# Patient Record
Sex: FEMALE | Race: WHITE | NOT HISPANIC OR LATINO | Employment: OTHER | URBAN - METROPOLITAN AREA
[De-identification: names, ages, dates, MRNs, and addresses within clinical notes are randomized per-mention and may not be internally consistent; named-entity substitution may affect disease eponyms.]

---

## 2019-03-21 DIAGNOSIS — M25.561 PAIN IN BOTH KNEES, UNSPECIFIED CHRONICITY: Primary | ICD-10-CM

## 2019-03-21 DIAGNOSIS — M25.562 PAIN IN BOTH KNEES, UNSPECIFIED CHRONICITY: Primary | ICD-10-CM

## 2019-03-26 ENCOUNTER — OFFICE VISIT (OUTPATIENT)
Dept: ORTHOPEDICS | Facility: CLINIC | Age: 64
End: 2019-03-26
Payer: MEDICARE

## 2019-03-26 ENCOUNTER — HOSPITAL ENCOUNTER (OUTPATIENT)
Dept: RADIOLOGY | Facility: HOSPITAL | Age: 64
Discharge: HOME | End: 2019-03-26
Attending: ORTHOPAEDIC SURGERY
Payer: MEDICARE

## 2019-03-26 VITALS — BODY MASS INDEX: 46.6 KG/M2 | RESPIRATION RATE: 20 BRPM | WEIGHT: 263 LBS | HEIGHT: 63 IN

## 2019-03-26 DIAGNOSIS — M17.12 ARTHRITIS OF LEFT KNEE: Primary | ICD-10-CM

## 2019-03-26 DIAGNOSIS — M17.11 ARTHRITIS OF KNEE, RIGHT: ICD-10-CM

## 2019-03-26 DIAGNOSIS — M25.562 PAIN IN BOTH KNEES, UNSPECIFIED CHRONICITY: ICD-10-CM

## 2019-03-26 DIAGNOSIS — M17.0 PRIMARY OSTEOARTHRITIS OF BOTH KNEES: Primary | ICD-10-CM

## 2019-03-26 DIAGNOSIS — M25.561 PAIN IN BOTH KNEES, UNSPECIFIED CHRONICITY: ICD-10-CM

## 2019-03-26 PROCEDURE — 1101F PT FALLS ASSESS-DOCD LE1/YR: CPT | Mod: CPTII,S$GLB,, | Performed by: ORTHOPAEDIC SURGERY

## 2019-03-26 PROCEDURE — 20610 DRAIN/INJ JOINT/BURSA W/O US: CPT | Mod: 50,S$GLB,, | Performed by: ORTHOPAEDIC SURGERY

## 2019-03-26 PROCEDURE — 73564 X-RAY EXAM KNEE 4 OR MORE: CPT | Mod: TC,50,PN

## 2019-03-26 PROCEDURE — 99203 OFFICE O/P NEW LOW 30 MIN: CPT | Mod: 25,S$GLB,, | Performed by: ORTHOPAEDIC SURGERY

## 2019-03-26 PROCEDURE — 3008F BODY MASS INDEX DOCD: CPT | Mod: CPTII,S$GLB,, | Performed by: ORTHOPAEDIC SURGERY

## 2019-03-26 PROCEDURE — 99999 PR PBB SHADOW E&M-EST. PATIENT-LVL III: CPT | Mod: PBBFAC,,, | Performed by: ORTHOPAEDIC SURGERY

## 2019-03-26 PROCEDURE — 99203 PR OFFICE/OUTPT VISIT, NEW, LEVL III, 30-44 MIN: ICD-10-PCS | Mod: 25,S$GLB,, | Performed by: ORTHOPAEDIC SURGERY

## 2019-03-26 PROCEDURE — 73564 XR KNEE ORTHO BILAT WITH FLEXION: ICD-10-PCS | Mod: 26,50,, | Performed by: RADIOLOGY

## 2019-03-26 PROCEDURE — 73564 X-RAY EXAM KNEE 4 OR MORE: CPT | Mod: 26,50,, | Performed by: RADIOLOGY

## 2019-03-26 PROCEDURE — 20610 LARGE JOINT ASPIRATION/INJECTION: R KNEE, L KNEE: ICD-10-PCS | Mod: 50,S$GLB,, | Performed by: ORTHOPAEDIC SURGERY

## 2019-03-26 PROCEDURE — 99999 PR PBB SHADOW E&M-EST. PATIENT-LVL III: ICD-10-PCS | Mod: PBBFAC,,, | Performed by: ORTHOPAEDIC SURGERY

## 2019-03-26 PROCEDURE — 1101F PR PT FALLS ASSESS DOC 0-1 FALLS W/OUT INJ PAST YR: ICD-10-PCS | Mod: CPTII,S$GLB,, | Performed by: ORTHOPAEDIC SURGERY

## 2019-03-26 PROCEDURE — 3008F PR BODY MASS INDEX (BMI) DOCUMENTED: ICD-10-PCS | Mod: CPTII,S$GLB,, | Performed by: ORTHOPAEDIC SURGERY

## 2019-03-26 RX ORDER — FLUTICASONE PROPIONATE 50 MCG
1 SPRAY, SUSPENSION (ML) NASAL NIGHTLY
Status: ON HOLD | COMMUNITY
Start: 2019-03-06 | End: 2019-10-07 | Stop reason: HOSPADM

## 2019-03-26 RX ORDER — LISINOPRIL AND HYDROCHLOROTHIAZIDE 20; 25 MG/1; MG/1
1 TABLET ORAL DAILY
Status: ON HOLD | COMMUNITY
End: 2019-09-18 | Stop reason: HOSPADM

## 2019-03-26 RX ORDER — UMECLIDINIUM BROMIDE AND VILANTEROL TRIFENATATE 62.5; 25 UG/1; UG/1
1 POWDER RESPIRATORY (INHALATION) DAILY PRN
COMMUNITY
Start: 2019-03-19 | End: 2019-10-23 | Stop reason: SDUPTHER

## 2019-03-26 RX ORDER — LOVASTATIN 20 MG/1
20 TABLET ORAL NIGHTLY
Status: ON HOLD | COMMUNITY
End: 2019-10-07 | Stop reason: HOSPADM

## 2019-03-26 RX ORDER — FLUOXETINE HYDROCHLORIDE 40 MG/1
40 CAPSULE ORAL DAILY
Status: ON HOLD | COMMUNITY
Start: 2019-03-03 | End: 2019-10-07 | Stop reason: SDUPTHER

## 2019-03-26 RX ORDER — ALBUTEROL SULFATE 90 UG/1
1 AEROSOL, METERED RESPIRATORY (INHALATION) DAILY PRN
Status: ON HOLD | COMMUNITY
Start: 2019-03-06 | End: 2019-10-07 | Stop reason: HOSPADM

## 2019-03-26 RX ADMIN — TRIAMCINOLONE ACETONIDE 40 MG: 40 INJECTION, SUSPENSION INTRA-ARTICULAR; INTRAMUSCULAR at 11:03

## 2019-03-26 NOTE — LETTER
March 29, 2019      Roselyn Cote, NP  [address] [address]  [address]  [facility] [address]           [address]  [facility] [address]  Phone: [phone]          Patient: Iris Mueller   MR Number: [id number]   YOB: 1955   Date of Visit: 3/26/2019       Dear Roselyn Cote:    Thank you for referring Iris Mueller to me for evaluation. Attached you will find relevant portions of my assessment and plan of care.    If you have questions, please do not hesitate to call me. I look forward to following Iris Mueller along with you.    Sincerely,    Delio Chung MD    Enclosure  CC:  No Recipients    If you would like to receive this communication electronically, please contact [email or web address] or [phone] to request more information on [employer] access.    For providers and/or their staff who would like to refer a patient to [facility], please contact us through our one-stop-shop provider referral line, [facility], at [phone].    If you feel you have received this communication in error or would no longer like to receive these types of communications, please e-mail [email or web address]

## 2019-03-29 RX ORDER — TRIAMCINOLONE ACETONIDE 40 MG/ML
40 INJECTION, SUSPENSION INTRA-ARTICULAR; INTRAMUSCULAR
Status: DISCONTINUED | OUTPATIENT
Start: 2019-03-26 | End: 2019-03-29 | Stop reason: HOSPADM

## 2019-03-29 NOTE — PROCEDURES
Large Joint Aspiration/Injection: R knee, L knee  Date/Time: 3/26/2019 11:59 PM  Performed by: Delio Chung MD  Authorized by: Delio Chung MD     Consent Done?:  Yes (Verbal)  Indications:  Pain  Timeout: Prior to procedure the correct patient, procedure, and site was verified      Location:  Knee  Site:  R knee and L knee  Prep: Patient was prepped and draped in usual sterile fashion    Approach:  Anteromedial  Medications:  40 mg triamcinolone acetonide 40 mg/mL; 40 mg triamcinolone acetonide 40 mg/mL

## 2019-03-29 NOTE — PROGRESS NOTES
Past Medical History:   Diagnosis Date    Asthma     Hypertension        Past Surgical History:   Procedure Laterality Date     SECTION      TONSILLECTOMY         Current Outpatient Medications   Medication Sig    ANORO ELLIPTA 62.5-25 mcg/actuation DsDv     FLUoxetine 40 MG capsule     fluticasone (FLONASE) 50 mcg/actuation nasal spray     lisinopril-hydrochlorothiazide (PRINZIDE,ZESTORETIC) 20-25 mg Tab Take 1 tablet by mouth once daily.    lovastatin (MEVACOR) 20 MG tablet Take 20 mg by mouth every evening.    VENTOLIN HFA 90 mcg/actuation inhaler      No current facility-administered medications for this visit.        Review of patient's allergies indicates:  No Known Allergies    History reviewed. No pertinent family history.    Social History     Socioeconomic History    Marital status:      Spouse name: Not on file    Number of children: Not on file    Years of education: Not on file    Highest education level: Not on file   Occupational History    Not on file   Social Needs    Financial resource strain: Not on file    Food insecurity:     Worry: Not on file     Inability: Not on file    Transportation needs:     Medical: Not on file     Non-medical: Not on file   Tobacco Use    Smoking status: Current Every Day Smoker    Smokeless tobacco: Never Used   Substance and Sexual Activity    Alcohol use: Not on file    Drug use: Not on file    Sexual activity: Not on file   Lifestyle    Physical activity:     Days per week: Not on file     Minutes per session: Not on file    Stress: Not on file   Relationships    Social connections:     Talks on phone: Not on file     Gets together: Not on file     Attends Latter-day service: Not on file     Active member of club or organization: Not on file     Attends meetings of clubs or organizations: Not on file     Relationship status: Not on file    Intimate partner violence:     Fear of current or ex partner: Not on file      "Emotionally abused: Not on file     Physically abused: Not on file     Forced sexual activity: Not on file   Other Topics Concern    Not on file   Social History Narrative    Not on file       Chief Complaint:   Chief Complaint   Patient presents with    Left Knee - Pain    Right Knee - Pain       Consulting Physician: Roselyn Cote NP    History of present illness:    This is a 63 y.o. female who complains of moderate chronic bilateral knee pain for years. Denies current injury. Old injury right knee. Pain 6/10 and worse with use.    Review of Systems:    Constitution: Denies chills, fever, and sweats.  HENT: Denies headaches or blurry vision.  Cardiovascular: Denies chest pain or irregular heart beat.  Respiratory: Denies cough or shortness of breath.  Gastrointestinal: Denies abdominal pain, nausea, or vomiting.  Musculoskeletal:  Denies muscle cramps.  Neurological: Denies dizziness or focal weakness.  Psychiatric/Behavioral: Normal mental status.  Hematologic/Lymphatic: Denies bleeding problem or easy bruising/bleeding.  Skin: Denies rash or suspicious lesions.    Examination:    Vital Signs:    Vitals:    03/26/19 1133   Resp: 20   Weight: 119.3 kg (263 lb)   Height: 5' 3" (1.6 m)   PainSc:   6   PainLoc: Knee       Body mass index is 46.59 kg/m².    This a well-developed, well nourished patient in no acute distress.    Alert and oriented x 3 and cooperative to examination.       Physical Exam: Left Knee Exam    Gait   Antalgic    Skin  Rash:   None  Scars:   None    Inspection  Erythema:  None  Bruising:  None  Effusion:  None  Masses:  None  Lymphadenopathy: None    Range of Motion: 0 to 130°    Medial Joint : y  Lateral Joint : n    Patellofemoral Tenderness: Yes  Patellofemoral Crepitus: Yes    Lachman:  Normal  Anterior Drawer: Normal  Posterior Drawer: Normal    Vanessa's:  Negative  Apley's:  Negative    Varus Stress:  Stable  Valgus " Stress:  Stable    Strength:  5/5    Pulses:  Palpable  Sensation:  Intact      Right Knee Exam    Gait   Antalgic    Skin  Rash:   None  Scars:   None    Inspection  Erythema:  None  Bruising:  None  Effusion:  None  Masses:  None  Lymphadenopathy: None    Range of Motion: 0 to 130°    Medial Joint : y  Lateral Joint : n    Patellofemoral Tenderness: Yes  Patellofemoral Crepitus: Yes    Lachman:  Normal  Anterior Drawer: Normal  Posterior Drawer: Normal    Vanessa's:  Negative  Apley's:  Negative    Varus Stress:  Stable  Valgus Stress:  Stable    Strength:  5/5    Pulses:  Palpable  Sensation:  Intact          Imaging: X-rays ordered and images interpreted today personally by me of right knee shows old plateau fracture and left shows moderate DJD.        Assessment: Arthritis of left knee  -     Large Joint Aspiration/Injection: R knee, L knee    Arthritis of knee, right  -     Large Joint Aspiration/Injection: R knee, L knee        Plan:  Start with steroid today and then Euflexxa.      DISCLAIMER: This note may have been dictated using voice recognition software and may contain grammatical errors.     NOTE: Consult report sent to referring provider via [employer] EMR.

## 2019-04-05 ENCOUNTER — TELEPHONE (OUTPATIENT)
Dept: ORTHOPEDICS | Facility: CLINIC | Age: 64
End: 2019-04-05

## 2019-04-05 NOTE — TELEPHONE ENCOUNTER
Returned pt's call, advised that I would contact auth department to get the doctor's name corrected for her injections.  No further needs at this time.

## 2019-04-05 NOTE — TELEPHONE ENCOUNTER
----- Message from Luba Gilman sent at 4/5/2019 10:54 AM CDT -----  Type: Needs Medical Advice    Who Called:  Patient    Best Call Back Number: [phone]    Additional Information: Patient received a letter in the mail from her insurance saying her injections were approved but the provider listed waswajaime Austin. Patient stated she's never been seen by this provider. Please call and advise.

## 2019-04-09 ENCOUNTER — OFFICE VISIT (OUTPATIENT)
Dept: ORTHOPEDICS | Facility: CLINIC | Age: 64
End: 2019-04-09
Payer: MEDICARE

## 2019-04-09 VITALS — WEIGHT: 263 LBS | HEIGHT: 63 IN | BODY MASS INDEX: 46.6 KG/M2

## 2019-04-09 DIAGNOSIS — M17.0 PRIMARY OSTEOARTHRITIS OF BOTH KNEES: Primary | ICD-10-CM

## 2019-04-09 PROCEDURE — 99999 PR PBB SHADOW E&M-EST. PATIENT-LVL III: CPT | Mod: PBBFAC,,, | Performed by: ORTHOPAEDIC SURGERY

## 2019-04-09 PROCEDURE — 99499 UNLISTED E&M SERVICE: CPT | Mod: S$GLB,,, | Performed by: ORTHOPAEDIC SURGERY

## 2019-04-09 PROCEDURE — 99999 PR PBB SHADOW E&M-EST. PATIENT-LVL III: ICD-10-PCS | Mod: PBBFAC,,, | Performed by: ORTHOPAEDIC SURGERY

## 2019-04-09 PROCEDURE — 20610 DRAIN/INJ JOINT/BURSA W/O US: CPT | Mod: 50,S$GLB,, | Performed by: ORTHOPAEDIC SURGERY

## 2019-04-09 PROCEDURE — 99499 NO LOS: ICD-10-PCS | Mod: S$GLB,,, | Performed by: ORTHOPAEDIC SURGERY

## 2019-04-09 PROCEDURE — 20610 LARGE JOINT ASPIRATION/INJECTION: R KNEE, L KNEE: ICD-10-PCS | Mod: 50,S$GLB,, | Performed by: ORTHOPAEDIC SURGERY

## 2019-04-11 NOTE — PROCEDURES
Large Joint Aspiration/Injection: R knee, L knee  Date/Time: 4/9/2019 9:33 PM  Performed by: Delio Chung MD  Authorized by: Delio Chung MD     Consent Done?:  Yes (Verbal)  Indications:  Pain  Timeout: Prior to procedure the correct patient, procedure, and site was verified      Location:  Knee  Site:  R knee and L knee  Prep: Patient was prepped and draped in usual sterile fashion    Approach:  Anteromedial  Medications:  20 mg sodium hyaluronate (EUFLEXXA) 10 mg/mL(mw 2.4 -3.6 million); 20 mg sodium hyaluronate (EUFLEXXA) 10 mg/mL(mw 2.4 -3.6 million)

## 2019-04-11 NOTE — PROGRESS NOTES
Past Medical History:   Diagnosis Date    Asthma     Hypertension        Past Surgical History:   Procedure Laterality Date     SECTION      TONSILLECTOMY         Current Outpatient Medications   Medication Sig    ANORO ELLIPTA 62.5-25 mcg/actuation DsDv     FLUoxetine 40 MG capsule     fluticasone (FLONASE) 50 mcg/actuation nasal spray     lisinopril-hydrochlorothiazide (PRINZIDE,ZESTORETIC) 20-25 mg Tab Take 1 tablet by mouth once daily.    lovastatin (MEVACOR) 20 MG tablet Take 20 mg by mouth every evening.    VENTOLIN HFA 90 mcg/actuation inhaler      No current facility-administered medications for this visit.        Review of patient's allergies indicates:  No Known Allergies    History reviewed. No pertinent family history.    Social History     Socioeconomic History    Marital status:      Spouse name: Not on file    Number of children: Not on file    Years of education: Not on file    Highest education level: Not on file   Occupational History    Not on file   Social Needs    Financial resource strain: Not on file    Food insecurity:     Worry: Not on file     Inability: Not on file    Transportation needs:     Medical: Not on file     Non-medical: Not on file   Tobacco Use    Smoking status: Current Every Day Smoker    Smokeless tobacco: Never Used   Substance and Sexual Activity    Alcohol use: Not on file    Drug use: Not on file    Sexual activity: Not on file   Lifestyle    Physical activity:     Days per week: Not on file     Minutes per session: Not on file    Stress: Not on file   Relationships    Social connections:     Talks on phone: Not on file     Gets together: Not on file     Attends Adventist service: Not on file     Active member of club or organization: Not on file     Attends meetings of clubs or organizations: Not on file     Relationship status: Not on file   Other Topics Concern    Not on file   Social History Narrative    Not on file  "      Chief Complaint:   Chief Complaint   Patient presents with    Injections     EUFLEXXA 1/3 BILATERAL KNEE       Consulting Physician: Delio Chung MD    History of present illness:    This is a 63 y.o. female who complains of moderate chronic bilateral knee pain for years. Denies current injury. Old injury right knee. Pain 6/10 and worse with use.    Review of Systems:    Constitution: Denies chills, fever, and sweats.  HENT: Denies headaches or blurry vision.  Cardiovascular: Denies chest pain or irregular heart beat.  Respiratory: Denies cough or shortness of breath.  Gastrointestinal: Denies abdominal pain, nausea, or vomiting.  Musculoskeletal:  Denies muscle cramps.  Neurological: Denies dizziness or focal weakness.  Psychiatric/Behavioral: Normal mental status.  Hematologic/Lymphatic: Denies bleeding problem or easy bruising/bleeding.  Skin: Denies rash or suspicious lesions.    Examination:    Vital Signs:    Vitals:    04/09/19 1409   Weight: 119.3 kg (263 lb 0.1 oz)   Height: 5' 3" (1.6 m)   PainSc: 10-Worst pain ever   PainLoc: Knee       Body mass index is 46.59 kg/m².    This a well-developed, well nourished patient in no acute distress.    Alert and oriented x 3 and cooperative to examination.       Physical Exam: Left Knee Exam    Gait   Antalgic    Skin  Rash:   None  Scars:   None    Inspection  Erythema:  None  Bruising:  None  Effusion:  None  Masses:  None  Lymphadenopathy: None    Range of Motion: 0 to 130°    Medial Joint : y  Lateral Joint : n    Patellofemoral Tenderness: Yes  Patellofemoral Crepitus: Yes    Lachman:  Normal  Anterior Drawer: Normal  Posterior Drawer: Normal    Vanessa's:  Negative  Apley's:  Negative    Varus Stress:  Stable  Valgus Stress:  Stable    Strength:  5/5    Pulses:  Palpable  Sensation:  Intact      Right Knee " Exam    Gait   Antalgic    Skin  Rash:   None  Scars:   None    Inspection  Erythema:  None  Bruising:  None  Effusion:  None  Masses:  None  Lymphadenopathy: None    Range of Motion: 0 to 130°    Medial Joint : y  Lateral Joint : n    Patellofemoral Tenderness: Yes  Patellofemoral Crepitus: Yes    Lachman:  Normal  Anterior Drawer: Normal  Posterior Drawer: Normal    Vanessa's:  Negative  Apley's:  Negative    Varus Stress:  Stable  Valgus Stress:  Stable    Strength:  5/5    Pulses:  Palpable  Sensation:  Intact          Imaging: X-rays ordered and images interpreted today personally by me of right knee shows old plateau fracture and left shows moderate DJD.        Assessment: Primary osteoarthritis of both knees  -     Large Joint Aspiration/Injection: R knee, L knee        Plan: Euflexxa.      DISCLAIMER: This note may have been dictated using voice recognition software and may contain grammatical errors.     NOTE: Consult report sent to referring provider via [employer] EMR.

## 2019-04-16 ENCOUNTER — OFFICE VISIT (OUTPATIENT)
Dept: ORTHOPEDICS | Facility: CLINIC | Age: 64
End: 2019-04-16
Payer: MEDICARE

## 2019-04-16 VITALS — WEIGHT: 263 LBS | HEIGHT: 63 IN | BODY MASS INDEX: 46.6 KG/M2

## 2019-04-16 DIAGNOSIS — M17.0 PRIMARY OSTEOARTHRITIS OF BOTH KNEES: Primary | ICD-10-CM

## 2019-04-16 PROCEDURE — 20610 DRAIN/INJ JOINT/BURSA W/O US: CPT | Mod: 50,S$GLB,, | Performed by: ORTHOPAEDIC SURGERY

## 2019-04-16 PROCEDURE — 20610 LARGE JOINT ASPIRATION/INJECTION: R KNEE, L KNEE: ICD-10-PCS | Mod: 50,S$GLB,, | Performed by: ORTHOPAEDIC SURGERY

## 2019-04-16 PROCEDURE — 99999 PR PBB SHADOW E&M-EST. PATIENT-LVL II: ICD-10-PCS | Mod: PBBFAC,,, | Performed by: ORTHOPAEDIC SURGERY

## 2019-04-16 PROCEDURE — 99499 NO LOS: ICD-10-PCS | Mod: S$GLB,,, | Performed by: ORTHOPAEDIC SURGERY

## 2019-04-16 PROCEDURE — 99499 UNLISTED E&M SERVICE: CPT | Mod: S$GLB,,, | Performed by: ORTHOPAEDIC SURGERY

## 2019-04-16 PROCEDURE — 99999 PR PBB SHADOW E&M-EST. PATIENT-LVL II: CPT | Mod: PBBFAC,,, | Performed by: ORTHOPAEDIC SURGERY

## 2019-04-16 NOTE — PROGRESS NOTES
Past Medical History:   Diagnosis Date    Asthma     Hypertension        Past Surgical History:   Procedure Laterality Date     SECTION      TONSILLECTOMY         Current Outpatient Medications   Medication Sig    ANORO ELLIPTA 62.5-25 mcg/actuation DsDv     FLUoxetine 40 MG capsule     fluticasone (FLONASE) 50 mcg/actuation nasal spray     lisinopril-hydrochlorothiazide (PRINZIDE,ZESTORETIC) 20-25 mg Tab Take 1 tablet by mouth once daily.    lovastatin (MEVACOR) 20 MG tablet Take 20 mg by mouth every evening.    VENTOLIN HFA 90 mcg/actuation inhaler      No current facility-administered medications for this visit.        Review of patient's allergies indicates:  No Known Allergies    History reviewed. No pertinent family history.    Social History     Socioeconomic History    Marital status:      Spouse name: Not on file    Number of children: Not on file    Years of education: Not on file    Highest education level: Not on file   Occupational History    Not on file   Social Needs    Financial resource strain: Not on file    Food insecurity:     Worry: Not on file     Inability: Not on file    Transportation needs:     Medical: Not on file     Non-medical: Not on file   Tobacco Use    Smoking status: Current Every Day Smoker    Smokeless tobacco: Never Used   Substance and Sexual Activity    Alcohol use: Not on file    Drug use: Not on file    Sexual activity: Not on file   Lifestyle    Physical activity:     Days per week: Not on file     Minutes per session: Not on file    Stress: Not on file   Relationships    Social connections:     Talks on phone: Not on file     Gets together: Not on file     Attends Yarsanism service: Not on file     Active member of club or organization: Not on file     Attends meetings of clubs or organizations: Not on file     Relationship status: Not on file   Other Topics Concern    Not on file   Social History Narrative    Not on file  "      Chief Complaint:   Chief Complaint   Patient presents with    Knee Pain     bilateral knee Euflexxa 2/3       Consulting Physician: No ref. provider found    History of present illness:    This is a 63 y.o. female who complains of moderate chronic bilateral knee pain for years. Denies current injury. Old injury right knee. Pain 6/10 and worse with use.    Review of Systems:    Constitution: Denies chills, fever, and sweats.  HENT: Denies headaches or blurry vision.  Cardiovascular: Denies chest pain or irregular heart beat.  Respiratory: Denies cough or shortness of breath.  Gastrointestinal: Denies abdominal pain, nausea, or vomiting.  Musculoskeletal:  Denies muscle cramps.  Neurological: Denies dizziness or focal weakness.  Psychiatric/Behavioral: Normal mental status.  Hematologic/Lymphatic: Denies bleeding problem or easy bruising/bleeding.  Skin: Denies rash or suspicious lesions.    Examination:    Vital Signs:    Vitals:    04/16/19 1404   Weight: 119.3 kg (263 lb)   Height: 5' 3" (1.6 m)   PainSc:   8       Body mass index is 46.59 kg/m².    This a well-developed, well nourished patient in no acute distress.    Alert and oriented x 3 and cooperative to examination.       Physical Exam: Left Knee Exam    Gait   Antalgic    Skin  Rash:   None  Scars:   None    Inspection  Erythema:  None  Bruising:  None  Effusion:  None  Masses:  None  Lymphadenopathy: None    Range of Motion: 0 to 130°    Medial Joint : y  Lateral Joint : n    Patellofemoral Tenderness: Yes  Patellofemoral Crepitus: Yes    Lachman:  Normal  Anterior Drawer: Normal  Posterior Drawer: Normal    Vanessa's:  Negative  Apley's:  Negative    Varus Stress:  Stable  Valgus Stress:  Stable    Strength:  5/5    Pulses:  Palpable  Sensation:  Intact      Right Knee " Exam    Gait   Antalgic    Skin  Rash:   None  Scars:   None    Inspection  Erythema:  None  Bruising:  None  Effusion:  None  Masses:  None  Lymphadenopathy: None    Range of Motion: 0 to 130°    Medial Joint : y  Lateral Joint : n    Patellofemoral Tenderness: Yes  Patellofemoral Crepitus: Yes    Lachman:  Normal  Anterior Drawer: Normal  Posterior Drawer: Normal    Vanessa's:  Negative  Apley's:  Negative    Varus Stress:  Stable  Valgus Stress:  Stable    Strength:  5/5    Pulses:  Palpable  Sensation:  Intact          Imaging: X-rays ordered and images interpreted today personally by me of right knee shows old plateau fracture and left shows moderate DJD.        Assessment: Primary osteoarthritis of both knees  -     Large Joint Aspiration/Injection: R knee, L knee        Plan: Euflexxa.      DISCLAIMER: This note may have been dictated using voice recognition software and may contain grammatical errors.     NOTE: Consult report sent to referring provider via [employer] EMR.

## 2019-04-16 NOTE — PROCEDURES
Large Joint Aspiration/Injection: R knee, L knee  Date/Time: 4/16/2019 2:22 PM  Performed by: Delio Chung MD  Authorized by: Delio Chung MD     Consent Done?:  Yes (Verbal)  Indications:  Pain  Timeout: Prior to procedure the correct patient, procedure, and site was verified      Location:  Knee  Site:  R knee and L knee  Prep: Patient was prepped and draped in usual sterile fashion    Approach:  Anteromedial  Medications:  20 mg sodium hyaluronate (EUFLEXXA) 10 mg/mL(mw 2.4 -3.6 million); 20 mg sodium hyaluronate (EUFLEXXA) 10 mg/mL(mw 2.4 -3.6 million)

## 2019-04-23 ENCOUNTER — OFFICE VISIT (OUTPATIENT)
Dept: ORTHOPEDICS | Facility: CLINIC | Age: 64
End: 2019-04-23
Payer: MEDICARE

## 2019-04-23 VITALS — WEIGHT: 263 LBS | BODY MASS INDEX: 46.6 KG/M2 | HEIGHT: 63 IN

## 2019-04-23 DIAGNOSIS — M17.0 PRIMARY OSTEOARTHRITIS OF BOTH KNEES: Primary | ICD-10-CM

## 2019-04-23 PROCEDURE — 20610 DRAIN/INJ JOINT/BURSA W/O US: CPT | Mod: 50,S$GLB,, | Performed by: ORTHOPAEDIC SURGERY

## 2019-04-23 PROCEDURE — 20610 LARGE JOINT ASPIRATION/INJECTION: R KNEE, L KNEE: ICD-10-PCS | Mod: 50,S$GLB,, | Performed by: ORTHOPAEDIC SURGERY

## 2019-04-23 PROCEDURE — 99499 NO LOS: ICD-10-PCS | Mod: S$GLB,,, | Performed by: ORTHOPAEDIC SURGERY

## 2019-04-23 PROCEDURE — 99499 UNLISTED E&M SERVICE: CPT | Mod: S$GLB,,, | Performed by: ORTHOPAEDIC SURGERY

## 2019-04-23 PROCEDURE — 99999 PR PBB SHADOW E&M-EST. PATIENT-LVL II: ICD-10-PCS | Mod: PBBFAC,,, | Performed by: ORTHOPAEDIC SURGERY

## 2019-04-23 PROCEDURE — 99999 PR PBB SHADOW E&M-EST. PATIENT-LVL II: CPT | Mod: PBBFAC,,, | Performed by: ORTHOPAEDIC SURGERY

## 2019-04-24 NOTE — PROCEDURES
Large Joint Aspiration/Injection: R knee, L knee  Date/Time: 4/23/2019 10:51 PM  Performed by: Delio Chung MD  Authorized by: Delio Chung MD     Consent Done?:  Yes (Verbal)  Indications:  Pain  Timeout: Prior to procedure the correct patient, procedure, and site was verified      Location:  Knee  Site:  R knee and L knee  Prep: Patient was prepped and draped in usual sterile fashion    Approach:  Anteromedial  Medications:  20 mg sodium hyaluronate (EUFLEXXA) 10 mg/mL(mw 2.4 -3.6 million); 20 mg sodium hyaluronate (EUFLEXXA) 10 mg/mL(mw 2.4 -3.6 million)

## 2019-04-24 NOTE — PROGRESS NOTES
Past Medical History:   Diagnosis Date    Asthma     Hypertension        Past Surgical History:   Procedure Laterality Date     SECTION      TONSILLECTOMY         Current Outpatient Medications   Medication Sig    ANORO ELLIPTA 62.5-25 mcg/actuation DsDv     FLUoxetine 40 MG capsule     fluticasone (FLONASE) 50 mcg/actuation nasal spray     lisinopril-hydrochlorothiazide (PRINZIDE,ZESTORETIC) 20-25 mg Tab Take 1 tablet by mouth once daily.    lovastatin (MEVACOR) 20 MG tablet Take 20 mg by mouth every evening.    VENTOLIN HFA 90 mcg/actuation inhaler      No current facility-administered medications for this visit.        Review of patient's allergies indicates:  No Known Allergies    History reviewed. No pertinent family history.    Social History     Socioeconomic History    Marital status:      Spouse name: Not on file    Number of children: Not on file    Years of education: Not on file    Highest education level: Not on file   Occupational History    Not on file   Social Needs    Financial resource strain: Not on file    Food insecurity:     Worry: Not on file     Inability: Not on file    Transportation needs:     Medical: Not on file     Non-medical: Not on file   Tobacco Use    Smoking status: Current Every Day Smoker    Smokeless tobacco: Never Used   Substance and Sexual Activity    Alcohol use: Not on file    Drug use: Not on file    Sexual activity: Not on file   Lifestyle    Physical activity:     Days per week: Not on file     Minutes per session: Not on file    Stress: Not on file   Relationships    Social connections:     Talks on phone: Not on file     Gets together: Not on file     Attends Pentecostalism service: Not on file     Active member of club or organization: Not on file     Attends meetings of clubs or organizations: Not on file     Relationship status: Not on file   Other Topics Concern    Not on file   Social History Narrative    Not on file  "      Chief Complaint:   Chief Complaint   Patient presents with    Injections     EUFLEXXA 3/3 BILATERAL KNEE       Consulting Physician: No ref. provider found    History of present illness:    This is a 63 y.o. female who complains of moderate chronic bilateral knee pain for years. Denies current injury. Old injury right knee. Pain 6/10 and worse with use.    Review of Systems:    Constitution: Denies chills, fever, and sweats.  HENT: Denies headaches or blurry vision.  Cardiovascular: Denies chest pain or irregular heart beat.  Respiratory: Denies cough or shortness of breath.  Gastrointestinal: Denies abdominal pain, nausea, or vomiting.  Musculoskeletal:  Denies muscle cramps.  Neurological: Denies dizziness or focal weakness.  Psychiatric/Behavioral: Normal mental status.  Hematologic/Lymphatic: Denies bleeding problem or easy bruising/bleeding.  Skin: Denies rash or suspicious lesions.    Examination:    Vital Signs:    Vitals:    04/23/19 1413   Weight: 119.3 kg (263 lb 0.1 oz)   Height: 5' 3" (1.6 m)   PainSc: 10-Worst pain ever   PainLoc: Knee       Body mass index is 46.59 kg/m².    This a well-developed, well nourished patient in no acute distress.    Alert and oriented x 3 and cooperative to examination.       Physical Exam: Left Knee Exam    Gait   Antalgic    Skin  Rash:   None  Scars:   None    Inspection  Erythema:  None  Bruising:  None  Effusion:  None  Masses:  None  Lymphadenopathy: None    Range of Motion: 0 to 130°    Medial Joint : y  Lateral Joint : n    Patellofemoral Tenderness: Yes  Patellofemoral Crepitus: Yes    Lachman:  Normal  Anterior Drawer: Normal  Posterior Drawer: Normal    Vanessa's:  Negative  Apley's:  Negative    Varus Stress:  Stable  Valgus Stress:  Stable    Strength:  5/5    Pulses:  Palpable  Sensation:  Intact      Right Knee " Exam    Gait   Antalgic    Skin  Rash:   None  Scars:   None    Inspection  Erythema:  None  Bruising:  None  Effusion:  None  Masses:  None  Lymphadenopathy: None    Range of Motion: 0 to 130°    Medial Joint : y  Lateral Joint : n    Patellofemoral Tenderness: Yes  Patellofemoral Crepitus: Yes    Lachman:  Normal  Anterior Drawer: Normal  Posterior Drawer: Normal    Vanessa's:  Negative  Apley's:  Negative    Varus Stress:  Stable  Valgus Stress:  Stable    Strength:  5/5    Pulses:  Palpable  Sensation:  Intact          Imaging: X-rays ordered and images interpreted today personally by me of right knee shows old plateau fracture and left shows moderate DJD.        Assessment: Primary osteoarthritis of both knees  -     Large Joint Aspiration/Injection: R knee, L knee        Plan: Euflexxa.      DISCLAIMER: This note may have been dictated using voice recognition software and may contain grammatical errors.     NOTE: Consult report sent to referring provider via [employer] EMR.

## 2019-06-04 ENCOUNTER — OFFICE VISIT (OUTPATIENT)
Dept: ORTHOPEDICS | Facility: CLINIC | Age: 64
End: 2019-06-04
Payer: MEDICARE

## 2019-06-04 VITALS — BODY MASS INDEX: 46.6 KG/M2 | RESPIRATION RATE: 18 BRPM | HEIGHT: 63 IN | WEIGHT: 263 LBS

## 2019-06-04 DIAGNOSIS — M17.11 PRIMARY OSTEOARTHRITIS OF RIGHT KNEE: Primary | ICD-10-CM

## 2019-06-04 DIAGNOSIS — Z01.818 PRE-OP EXAM: Primary | ICD-10-CM

## 2019-06-04 DIAGNOSIS — M17.11 PRIMARY OSTEOARTHRITIS OF RIGHT KNEE: ICD-10-CM

## 2019-06-04 PROCEDURE — 99999 PR PBB SHADOW E&M-EST. PATIENT-LVL III: CPT | Mod: PBBFAC,,, | Performed by: ORTHOPAEDIC SURGERY

## 2019-06-04 PROCEDURE — 3008F BODY MASS INDEX DOCD: CPT | Mod: CPTII,S$GLB,, | Performed by: ORTHOPAEDIC SURGERY

## 2019-06-04 PROCEDURE — 99215 PR OFFICE/OUTPT VISIT, EST, LEVL V, 40-54 MIN: ICD-10-PCS | Mod: S$GLB,,, | Performed by: ORTHOPAEDIC SURGERY

## 2019-06-04 PROCEDURE — 3008F PR BODY MASS INDEX (BMI) DOCUMENTED: ICD-10-PCS | Mod: CPTII,S$GLB,, | Performed by: ORTHOPAEDIC SURGERY

## 2019-06-04 PROCEDURE — 99999 PR PBB SHADOW E&M-EST. PATIENT-LVL III: ICD-10-PCS | Mod: PBBFAC,,, | Performed by: ORTHOPAEDIC SURGERY

## 2019-06-04 PROCEDURE — 1101F PR PT FALLS ASSESS DOC 0-1 FALLS W/OUT INJ PAST YR: ICD-10-PCS | Mod: CPTII,S$GLB,, | Performed by: ORTHOPAEDIC SURGERY

## 2019-06-04 PROCEDURE — 99215 OFFICE O/P EST HI 40 MIN: CPT | Mod: S$GLB,,, | Performed by: ORTHOPAEDIC SURGERY

## 2019-06-04 PROCEDURE — 1101F PT FALLS ASSESS-DOCD LE1/YR: CPT | Mod: CPTII,S$GLB,, | Performed by: ORTHOPAEDIC SURGERY

## 2019-06-05 RX ORDER — MUPIROCIN 20 MG/G
OINTMENT TOPICAL
Status: CANCELLED | OUTPATIENT
Start: 2019-06-05

## 2019-06-05 RX ORDER — SODIUM CHLORIDE 9 MG/ML
INJECTION, SOLUTION INTRAVENOUS CONTINUOUS
Status: CANCELLED | OUTPATIENT
Start: 2019-06-05

## 2019-06-06 NOTE — PROGRESS NOTES
Past Medical History:   Diagnosis Date    Asthma     Hypertension        Past Surgical History:   Procedure Laterality Date     SECTION      TONSILLECTOMY         Current Outpatient Medications   Medication Sig    ANORO ELLIPTA 62.5-25 mcg/actuation DsDv     FLUoxetine 40 MG capsule     fluticasone (FLONASE) 50 mcg/actuation nasal spray     lisinopril-hydrochlorothiazide (PRINZIDE,ZESTORETIC) 20-25 mg Tab Take 1 tablet by mouth once daily.    lovastatin (MEVACOR) 20 MG tablet Take 20 mg by mouth every evening.    VENTOLIN HFA 90 mcg/actuation inhaler      No current facility-administered medications for this visit.        Review of patient's allergies indicates:  No Known Allergies    History reviewed. No pertinent family history.    Social History     Socioeconomic History    Marital status:      Spouse name: Not on file    Number of children: Not on file    Years of education: Not on file    Highest education level: Not on file   Occupational History    Not on file   Social Needs    Financial resource strain: Not on file    Food insecurity:     Worry: Not on file     Inability: Not on file    Transportation needs:     Medical: Not on file     Non-medical: Not on file   Tobacco Use    Smoking status: Current Every Day Smoker    Smokeless tobacco: Never Used   Substance and Sexual Activity    Alcohol use: Not on file    Drug use: Not on file    Sexual activity: Not on file   Lifestyle    Physical activity:     Days per week: Not on file     Minutes per session: Not on file    Stress: Not on file   Relationships    Social connections:     Talks on phone: Not on file     Gets together: Not on file     Attends [employer] service: Not on file     Active member of club or organization: Not on file     Attends meetings of clubs or organizations: Not on file     Relationship status: Not on file   Other Topics Concern    Not on file   Social History Narrative    Not on file  "      Chief Complaint:   Chief Complaint   Patient presents with    Left Knee - Pain    Right Knee - Pain       Consulting Physician: No ref. provider found    History of present illness:    This is a 63 y.o. female who complains of moderate chronic bilateral knee pain for years. Denies current injury. Old injury right knee. Pain 9/10 and worse with use. Injections helped a little for a short time. NSAIDS and home exercise program have failed to improve symptoms.    Review of Systems:    Constitution: Denies chills, fever, and sweats.  HENT: Denies headaches or blurry vision.  Cardiovascular: Denies chest pain or irregular heart beat.  Respiratory: Denies cough or shortness of breath.  Gastrointestinal: Denies abdominal pain, nausea, or vomiting.  Musculoskeletal:  Denies muscle cramps.  Neurological: Denies dizziness or focal weakness.  Psychiatric/Behavioral: Normal mental status.  Hematologic/Lymphatic: Denies bleeding problem or easy bruising/bleeding.  Skin: Denies rash or suspicious lesions.    Examination:    Vital Signs:    Vitals:    06/04/19 1402   Resp: 18   Weight: 119.3 kg (263 lb)   Height: 5' 3" (1.6 m)   PainSc:   9       Body mass index is 46.59 kg/m².    This a well-developed, well nourished patient in no acute distress.    Alert and oriented x 3 and cooperative to examination.       Physical Exam: Left Knee Exam    Gait   Antalgic    Skin  Rash:   None  Scars:   None    Inspection  Erythema:  None  Bruising:  None  Effusion:  None  Masses:  None  Lymphadenopathy: None    Range of Motion: 0 to 130°    Medial Joint : y  Lateral Joint : n    Patellofemoral Tenderness: Yes  Patellofemoral Crepitus: Yes    Lachman:  Normal  Anterior Drawer: Normal  Posterior Drawer: Normal    Vanessa's:  Negative  Apley's:  Negative    Varus Stress:  Stable  Valgus Stress:  Stable    Strength:  5/5    Pulses:  Palpable  Sensation:  Intact      Right Knee " Exam    Gait   Antalgic    Skin  Rash:   None  Scars:   None    Inspection  Erythema:  None  Bruising:  None  Effusion:  None  Masses:  None  Lymphadenopathy: None    Range of Motion: 0 to 130°    Medial Joint : y  Lateral Joint : n    Patellofemoral Tenderness: Yes  Patellofemoral Crepitus: Yes    Lachman:  Normal  Anterior Drawer: Normal  Posterior Drawer: Normal    Vanessa's:  Negative  Apley's:  Negative    Varus Stress:  Stable  Valgus Stress:  Stable    Strength:  5/5    Pulses:  Palpable  Sensation:  Intact          Imaging: X-rays of right knee shows old plateau fracture with severe DJD and left shows moderate DJD.        Assessment: Pre-op exam  -     Case Request Operating Room: ARTHROPLASTY, KNEE    Primary osteoarthritis of right knee  -     Case Request Operating Room: ARTHROPLASTY, KNEE        Plan: She is requesting right TKA due to increase in pain. Has family history of blood clots.    After discussing the diagnosis and reviewing treatment options, the patient/family elected to proceed with surgical intervention.    In preparation for surgery:    A pre-operative clearance request was placed with primary care physician.    Appropriate pre-operative labs were ordered.    An EKG examination was ordered.    A chest X-ray was ordered.    A pre-operative planning MRI study was ordered.    Pertinent risk factors and comorbidities for surgery were identified and reviewed, including HTN and clotting issues in family (Mother, brother)    Pre-operative antibiotics were ordered.    The risks, benefits, and alternatives to the procedure were explained to the patient/family including, but not limited to: incomplete pain relief, surgical failure, hardware failure, need for further procedures, damage to nerves, arteries, blood vessels and other structures, infection, Deep Vein Thrombosis (DVT), Pulmonary Embolus (PE), Complex Regional Pain Syndrome as well as general anesthetic complications  including seizure, stroke, heart attack and even death. The patient/family understood these risks and wished to proceed and signed the informed consent. All questions were answered. No guarantees were implied or stated.    We will obtain the necessary clearances and schedule the patient for surgery.            DISCLAIMER: This note may have been dictated using voice recognition software and may contain grammatical errors.     NOTE: Consult report sent to referring provider via [employer] EMR.

## 2019-06-17 ENCOUNTER — TELEPHONE (OUTPATIENT)
Dept: ORTHOPEDICS | Facility: CLINIC | Age: 64
End: 2019-06-17

## 2019-06-17 NOTE — TELEPHONE ENCOUNTER
----- Message from Delilah Veloz sent at 6/17/2019  2:28 PM CDT -----  Contact: Patient  Type: Needs Medical Advice    Who Called:  Patient  Symptoms (please be specific):  Possible osteopsorsis  How long has patient had these symptoms:  fede  Pharmacy name and phone #:  fede  Best Call Back Number: [phone] (home)    Additional Information: Patient states that she had an appointment with her primary care provider, states that she was told that she shows signs of osteopsorsis. States that she is unsure if she should take the medication due to her upcoming surgery. Please call to advise, thank you!

## 2019-06-20 ENCOUNTER — TELEPHONE (OUTPATIENT)
Dept: ORTHOPEDICS | Facility: CLINIC | Age: 64
End: 2019-06-20

## 2019-06-20 NOTE — TELEPHONE ENCOUNTER
Called Dr. You's office at providers request.  Sent message to his nurse to ask if they would order labs on patient due to her family history of fatal blood clots.

## 2019-06-25 ENCOUNTER — HOSPITAL ENCOUNTER (OUTPATIENT)
Dept: RADIOLOGY | Facility: HOSPITAL | Age: 64
Discharge: HOME | End: 2019-06-25
Attending: ORTHOPAEDIC SURGERY
Payer: MEDICARE

## 2019-06-25 DIAGNOSIS — M17.11 PRIMARY OSTEOARTHRITIS OF RIGHT KNEE: ICD-10-CM

## 2019-06-25 PROCEDURE — 73721 MRI KNEE WITHOUT CONTRAST RIGHT: ICD-10-PCS | Mod: 26,RT,, | Performed by: RADIOLOGY

## 2019-06-25 PROCEDURE — 73721 MRI JNT OF LWR EXTRE W/O DYE: CPT | Mod: 26,RT,, | Performed by: RADIOLOGY

## 2019-06-25 PROCEDURE — 73721 MRI JNT OF LWR EXTRE W/O DYE: CPT | Mod: TC,RT

## 2019-06-25 PROCEDURE — 77073 BONE LENGTH STUDIES: CPT | Mod: 26,,, | Performed by: RADIOLOGY

## 2019-06-25 PROCEDURE — 77073 BONE LENGTH STUDIES: CPT | Mod: TC,FY

## 2019-06-25 PROCEDURE — 77073 XR HIP TO ANKLE: ICD-10-PCS | Mod: 26,,, | Performed by: RADIOLOGY

## 2019-08-01 ENCOUNTER — TELEPHONE (OUTPATIENT)
Dept: CARDIOLOGY | Facility: CLINIC | Age: 64
End: 2019-08-01

## 2019-08-01 ENCOUNTER — HOSPITAL ENCOUNTER (OUTPATIENT)
Dept: PREADMISSION TESTING | Facility: HOSPITAL | Age: 64
Discharge: HOME | End: 2019-08-01
Attending: ORTHOPAEDIC SURGERY
Payer: MEDICARE

## 2019-08-01 ENCOUNTER — HOSPITAL ENCOUNTER (OUTPATIENT)
Dept: RADIOLOGY | Facility: HOSPITAL | Age: 64
Discharge: HOME | End: 2019-08-01
Attending: ORTHOPAEDIC SURGERY
Payer: MEDICARE

## 2019-08-01 ENCOUNTER — TELEPHONE (OUTPATIENT)
Dept: ORTHOPEDICS | Facility: CLINIC | Age: 64
End: 2019-08-01

## 2019-08-01 VITALS — WEIGHT: 265 LBS | BODY MASS INDEX: 48.76 KG/M2 | HEIGHT: 62 IN

## 2019-08-01 DIAGNOSIS — I10 ESSENTIAL (PRIMARY) HYPERTENSION: ICD-10-CM

## 2019-08-01 DIAGNOSIS — Z01.818 PRE-OP EXAM: ICD-10-CM

## 2019-08-01 DIAGNOSIS — I10 ESSENTIAL HYPERTENSION, MALIGNANT: Primary | ICD-10-CM

## 2019-08-01 DIAGNOSIS — M17.11 PRIMARY OSTEOARTHRITIS OF RIGHT KNEE: Primary | ICD-10-CM

## 2019-08-01 DIAGNOSIS — Z01.818 PREOP EXAMINATION: ICD-10-CM

## 2019-08-01 DIAGNOSIS — F17.210 CIGARETTE SMOKER: ICD-10-CM

## 2019-08-01 DIAGNOSIS — Z01.818 PRE-OPERATIVE CLEARANCE: Primary | ICD-10-CM

## 2019-08-01 DIAGNOSIS — R07.9 CHEST PAIN, UNSPECIFIED: ICD-10-CM

## 2019-08-01 LAB
ABO + RH BLD: NORMAL
ANION GAP SERPL CALC-SCNC: 9 MMOL/L (ref 8–16)
BASOPHILS # BLD AUTO: 0.05 K/UL (ref 0–0.2)
BASOPHILS NFR BLD: 0.5 % (ref 0–1.9)
BLD GP AB SCN CELLS X3 SERPL QL: NORMAL
BUN SERPL-MCNC: 13 MG/DL (ref 8–23)
CALCIUM SERPL-MCNC: 9.8 MG/DL (ref 8.7–10.5)
CHLORIDE SERPL-SCNC: 92 MMOL/L (ref 95–110)
CO2 SERPL-SCNC: 28 MMOL/L (ref 23–29)
CREAT SERPL-MCNC: 1.1 MG/DL (ref 0.5–1.4)
DIFFERENTIAL METHOD: ABNORMAL
EOSINOPHIL # BLD AUTO: 0.2 K/UL (ref 0–0.5)
EOSINOPHIL NFR BLD: 2.2 % (ref 0–8)
ERYTHROCYTE [DISTWIDTH] IN BLOOD BY AUTOMATED COUNT: 13.8 % (ref 11.5–14.5)
EST. GFR  (AFRICAN AMERICAN): >60 ML/MIN/1.73 M^2
EST. GFR  (NON AFRICAN AMERICAN): 53 ML/MIN/1.73 M^2
GLUCOSE SERPL-MCNC: 87 MG/DL (ref 70–110)
HCT VFR BLD AUTO: 39.8 % (ref 37–48.5)
HGB BLD-MCNC: 12.9 G/DL (ref 12–16)
IMM GRANULOCYTES # BLD AUTO: 0.07 K/UL (ref 0–0.04)
LYMPHOCYTES # BLD AUTO: 1.8 K/UL (ref 1–4.8)
LYMPHOCYTES NFR BLD: 20.2 % (ref 18–48)
MCH RBC QN AUTO: 30.3 PG (ref 27–31)
MCHC RBC AUTO-ENTMCNC: 32.4 G/DL (ref 32–36)
MCV RBC AUTO: 93 FL (ref 82–98)
MONOCYTES # BLD AUTO: 0.6 K/UL (ref 0.3–1)
MONOCYTES NFR BLD: 6.8 % (ref 4–15)
NEUTROPHILS # BLD AUTO: 6.4 K/UL (ref 1.8–7.7)
NEUTROPHILS NFR BLD: 69.5 % (ref 38–73)
NRBC BLD-RTO: 0 /100 WBC
PLATELET # BLD AUTO: 400 K/UL (ref 150–350)
PMV BLD AUTO: 8.6 FL (ref 9.2–12.9)
POTASSIUM SERPL-SCNC: 3.3 MMOL/L (ref 3.5–5.1)
RBC # BLD AUTO: 4.26 M/UL (ref 4–5.4)
SODIUM SERPL-SCNC: 129 MMOL/L (ref 136–145)
WBC # BLD AUTO: 9.13 K/UL (ref 3.9–12.7)

## 2019-08-01 PROCEDURE — 93010 ELECTROCARDIOGRAM REPORT: CPT | Mod: ,,, | Performed by: INTERNAL MEDICINE

## 2019-08-01 PROCEDURE — 99900104 DSU ONLY-NO CHARGE-EA ADD'L HR (STAT)

## 2019-08-01 PROCEDURE — 85025 COMPLETE CBC W/AUTO DIFF WBC: CPT

## 2019-08-01 PROCEDURE — 93005 ELECTROCARDIOGRAM TRACING: CPT

## 2019-08-01 PROCEDURE — 87081 CULTURE SCREEN ONLY: CPT

## 2019-08-01 PROCEDURE — 93010 EKG 12-LEAD: ICD-10-PCS | Mod: ,,, | Performed by: INTERNAL MEDICINE

## 2019-08-01 PROCEDURE — 85220 BLOOC CLOT FACTOR V TEST: CPT

## 2019-08-01 PROCEDURE — 86901 BLOOD TYPING SEROLOGIC RH(D): CPT

## 2019-08-01 PROCEDURE — 99900103 DSU ONLY-NO CHARGE-INITIAL HR (STAT)

## 2019-08-01 PROCEDURE — 80048 BASIC METABOLIC PNL TOTAL CA: CPT

## 2019-08-01 PROCEDURE — 71046 XR CHEST PA AND LATERAL: ICD-10-PCS | Mod: 26,,, | Performed by: RADIOLOGY

## 2019-08-01 PROCEDURE — 71046 X-RAY EXAM CHEST 2 VIEWS: CPT | Mod: TC,FY

## 2019-08-01 PROCEDURE — 71046 X-RAY EXAM CHEST 2 VIEWS: CPT | Mod: 26,,, | Performed by: RADIOLOGY

## 2019-08-01 PROCEDURE — 81003 URINALYSIS AUTO W/O SCOPE: CPT

## 2019-08-01 NOTE — PRE ADMISSION SCREENING
JOINT CAMP ASSESSMENT    Name Iris Mueller   MRN [id number]    Age/Sex 63 y.o. female    Surgeon Dr. Delio Chung   Joint Camp Date 2019   Surgery Date 2019   Procedure Right Knee Arthroplasty   Insurance Payor: [employer]ARE / Plan: [employer] 65 / Product Type: Medicare Advantage /    Care Team Patient Care Team:  Elkin You MD as PCP - General (Family Medicine)  Delio Cuhng MD as Consulting Physician (Sports Medicine)    Pharmacy   [id number] - [place], [place] - [address]  [address]  [place] [address]  Phone: [phone] Fax: [phone]     AM-PAC Score   17   Risk Assessment Score 33     Past Medical History:   Diagnosis Date    Asthma     Hypertension        Past Surgical History:   Procedure Laterality Date     SECTION      TONSILLECTOMY           Home Enviroment     Living Arrangement: Lives with spouse  Home Environment: 1-story house/ trailer, number of outside stairs: 1, number of inside stairs: 1, walk-in shower  Home Safety Concerns: Pets in the home: dogs (2).    DISCHARGE CAREGIVER/SUPPORT SYSTEM     Identified post-op caregiver: Patient has children / family / friends to help, sister.  Patient's caregiver(s) will be able to provide physical assistance. Patient will have someone to assist overnight.  Patient to stay with sister, Lamin Camp, at [address] [place] [address] Ph: [phone] for 2 weeks after surgery.    Caregiver present at pre-op interview:  No      PRE-OPERATIVE FUNCTIONAL STATUS     Employment: Retired    Pre-op Functional Status: Patient is independent with mobility/ambulation, transfers, ADL's, IADL's.    Use of assistive device for ambulation: straight cane  ADL: self care  ADL Limitations: difficulty with walking  Medical Restrictions: Frequent Falls and Decreased range of motions in extremities    POTENTIAL BARRIERS TO DISCHARGE/POTENTIAL POST-OP COMPLICATIONS      Patient is a current everyday smoker which could delay wound healing. Patient with hx of HTN and asthma.    DISCHARGE PLAN     Expected LOS of 2 days or less for joint replacement discussed with patient. We also discussed a discharge path of HH for approximately two weeks with a transition to outpatient PT on the third week given no post-op complications.      Patient in agreement with discharge plan: Yes    Discharge to: Discharge home with home health (PT/OT) x2 weeks with transition to outpatient PT     HH:  [facility]     OP PT: [facility]     Home DME: single point cane and assistive device kit    Needed DME at D/C: rolling walker and bedside commode     Rx: Per Dr. Chung at discharge     Meds to Beds: N/A  Patient expected to discharge on Aspirin 325mg by mouth twice daily for DVT prophylaxis.

## 2019-08-01 NOTE — DISCHARGE INSTRUCTIONS
To confirm, Your doctor has instructed you that surgery is scheduled for: 8/15/19   EMILI  [phone]    Please report to [facility] the morning of surgery. You must check-in and receive a wristband before going to your procedure.    Pre-Op will call the afternoon prior to surgery between 1:00 and 6:00 PM with the final arrival time.  Phone number: [phone]    PLEASE NOTE:  The surgery schedule has many variables which may affect the time of your surgery case.  Family members should be available if your surgery time changes.  Plan to be here the day of your procedure between 4-6 hours.    MEDICATIONS:  TAKE ONLY THESE MEDICATIONS WITH A SMALL SIP OF WATER THE MORNING OF YOUR PROCEDURE:  PROZAC    DO NOT TAKE THESE MEDICATIONS 5-7 DAYS PRIOR to your procedure or per your surgeon's request: ASPIRIN, ALEVE, ADVIL, IBUPROFEN, FISH OIL VITAMIN E, HERBALS  (May take Tylenol)                                        NO LISINOPRIL AM OF SURGERY    ONLY if you are prescribed any types of blood thinners such as:  Aspirin, Coumadin, Plavix, Pradaxa, Xarelto, Aggrenox, Effient, Eliquis, Savasya, Brilinta, or any other, ask your surgeon whether you should stop taking them and how long before surgery you should stop.  You may also need to verify with the prescribing physician if it is ok to stop your medication.      INSTRUCTIONS IMPORTANT!!  · Do not eat or drink anything between midnight and the time of your procedure- this includes gum, mints, and candy.  · Do not smoke or drink alcoholic beverages 24 hours prior to your procedure.  · Shower the night before AND the morning of your procedure with a Chlorhexidine wash such as Hibiclens or Dial antibacterial soap from the neck down.  Do not get it on your face or in your eyes.  You may use your own shampoo and face wash. This helps your skin to be as bacteria free as possible.    · If you wear contact lenses, dentures, hearing aids or  glasses, bring a container to put them in during surgery and give to a family member for safe keeping.  Please leave all jewelry, piercing's and valuables at home.   · DO NOT remove hair from the surgery site.  Do not shave the incision site unless you are given specific instructions to do so.    · ONLY if you have been diagnosed with sleep apnea please bring your C-PAP machine.  · ONLY if you wear home oxygen please bring your portable oxygen tank the day of your procedure.  · ONLY if you have a history of OPEN HEART SURGERY you will need a clearance from your Cardiologist per Anesthesia.      · ONLY for patients requiring bowel prep, written instructions will be given by your doctor's office.  · ONLY if you have a neuro stimulator, please bring the controller with you the morning of surgery  · ONLY if a type and screen test is needed before surgery, please return:  · If your doctor has scheduled you for an overnight stay, bring a small overnight bag with any personal items you need.  · Make arrangements in advance for transportation home by a responsible adult.  It is not safe to drive a vehicle during the 24 hours after anesthesia.      · Visiting hours are 10:00AM to 8:30PM.  For the safety of all patients, visitors under the age of 12 are not allowed above the first floor of the hospital.    · All [facility] facilities and properties are tobacco free.  Smoking is NOT allowed.       If you have any questions about these instructions, call Pre-Op Admit  Nursing at [phone] or the Pre-Op Day Surgery Unit at [phone].

## 2019-08-01 NOTE — PRE ADMISSION SCREENING
"               [facility] Risk Assessment Scale    Patient Name: Iris Mueller  YOB: 1955  MRN: [id number]            RIsk Factor Measure Recommendation Patient Data Scale/Score   BMI >40 Reconsider surgery, weight loss   Estimated body mass index is 48.47 kg/m² as calculated from the following:    Height as of this encounter: 5' 2" (1.575 m).    Weight as of this encounter: 120.2 kg (265 lb).   [] 0 = 1 - 24.9  [] 1 = 25-29.9  [] 2 = 30-34.9  [] 3 = 35-39.9  [] 4 = 40-44.9  [x] 5 = 45-99.9   Hemoglobin AIC (if applicable) >9 Delay surgery until DM under control  Refer for:  · Nutrition Therapy  · Exercise   · Medication    No results found for: LABA1C, HGBA1C    Lab Results   Component Value Date    GLU 87 08/01/2019      [] 0 = 4.0-5.6  [] 1 = 5.7-6.4  [] 2 = 6.5-6.9  [] 3 = 7.0-7.9  [] 4 = 8.0-8.9  [] 5 = 9.0-12   Hemoglobin (Anemia) <9 Delay surgery   Correct anemia Lab Results   Component Value Date    HGB 12.9 08/01/2019    [] 20 - <9.0                    Albumin <3 Delay surgery &Workup No results found for: ALBUMIN [] 20 - <3.0   Smoking Cessation >4 Weeks Delay Surgery  Refer to OP Cessation Class     [x] 20 - current smoker                                __0.5__ PPD                    Hx of MI, PE, Arrhythmia, CVA, DVT <30 Days Delay Surgery    N/A [] 20      Infection Variable Delay surgery and re-evaluate   N/A [] 20 - recent/current infection     Depression (PHQ) >10 out of 27 Delay Surgery and re-evaluate  Medication  Counseling              [x] 0     []1     []2     []3      []4      [] 5                    (1-4)      (5-9)  (10-14)  (15-19)   (20-27)     Memory Impairment & Memory loss (Mini-Cog Screening Tool) Advanced dementia and/or Parkinson's Reconsider surgery     [x] 0     []1     []2     []3     []4     [] 5     Physical Conditioning (Modified AM-PAC Per Physical Therapy at [facility]) Unable to ambulate on day of surgery Delay surgery and re-evaluate  Pre-Rehabilitation   " (PT evaluation)       []  0   []4       [x]8     []12        []16     []20       (<20%)   (<40%)   (<60%)   (<80% )    (>80%)     Home Environment/Caregiver support  (Per /Navigator Interview)    Availability of basic services and/or approprate assistance during post-operative period Delay surgery and re-evaluate  Safe home environment  Health   1 week post-surgery  Transportation  availability  Ability to obtain DME/Medications post-op    [x] 0     []1     []2     []3     []4     [] 5  [x] 0     []1     []2     []3     []4     [] 5  [x] 0     []1     []2     []3     []4     [] 5  [x] 0     []1     []2     []3     []4     [] 5         MD Contact: Dr. Chung Comments:  Total Score:  33

## 2019-08-01 NOTE — TELEPHONE ENCOUNTER
Returned pt's call, advised that we have not called her.  It looks like Dr. Reynoso's office had tried to reach her.  Pt is waiting on stress test and cardiology clearance for surgery scheduled 8/14/19.  She will call me back and let me know if we need to reschedule her surgery to later date.

## 2019-08-01 NOTE — TELEPHONE ENCOUNTER
Called Mrs. Mueller in regards to referral to Dr. Umesh PUENTES/MAYI and also contacted Mrs. Bradley who put order in, to inform her that Dr. Reynoso is not accepting new patients at this time.

## 2019-08-01 NOTE — TELEPHONE ENCOUNTER
----- Message from Carlos Farr sent at 8/1/2019  1:58 PM CDT -----  Contact: Patient  Type:  Patient Returning Call    Who Called:  Patient  Who Left Message for Patient:  unknown  Does the patient know what this is regarding?:  unknown  Best Call Back Number:  [phone]  Additional Information:  NA

## 2019-08-01 NOTE — PRE ADMISSION SCREENING
Patient Name: Iris Mueller  YOB: 1955   MRN: [id number]     [facility]   Basic Mobility Inpatient Short Form 6 Clicks         How much difficulty does the patient currently have  Unable  A Lot  A Little  None      1. Turning over in bed (including adjusting bedclothes, sheets and blankets)?     1 [x]    2 []    3 []    4 []        2. Sitting down on and standing up from a chair with arms (e.g., wheelchair, bedside commode, etc.)     1 []  2 [x]  3 []     4 []      3. Moving from lying on back to sitting on the side of the bed?     1 []  2 [x]  3 []    4 []    How much help from another person does the patient currently need  Total  A Lot  A Little  None      4. Moving to and from a bed to a chair (including a wheelchair)?    1 []  2 []  3 []    4 [x]      5. Need to walk in hospital room?    1 []  2 []  3 []    4 [x]      6. Climbing 3-5 steps with a railing?    1 []  2 []  3 []    4 [x]       Raw Score:     17             CMS 0-100% Score:   50.57         %   Standardized Score:   42.13            CMS Modifier:        CK                                  [facility]   Basic Mobility Inpatient Short Form 6 Clicks Score Conversion Table*         *Use this form to convert -PAC Basic Mobility Inpatient Raw Scores.   [facility] Inpatient Basic Mobility Short Form Scoring Example   1. Add the number values associated with the response to each item. For example, items totals yield a Raw Score of 21.   2. Match the raw score to the t-Scale scores (t-Scale score = 50.25, SE = 4.69).   3. Find the associated CMS % (CMS % = 28.97%).   4. Locate the correct CMS Functional Modifier Code, or G Code (G code = CJ)     NOTE: Each -PAC Short Form has a separate conversion table. Make sure that you use the correct conversion table.       Instruction Manual - page 45 contains conversion table

## 2019-08-03 LAB — MRSA SPEC QL CULT: NORMAL

## 2019-08-05 LAB — FACT V ACT/NOR PPP: 83 % (ref 60–145)

## 2019-08-07 ENCOUNTER — TELEPHONE (OUTPATIENT)
Dept: CARDIOLOGY | Facility: HOSPITAL | Age: 64
End: 2019-08-07

## 2019-08-08 ENCOUNTER — HOSPITAL ENCOUNTER (OUTPATIENT)
Dept: PREADMISSION TESTING | Facility: HOSPITAL | Age: 64
Discharge: HOME | End: 2019-08-08
Attending: ORTHOPAEDIC SURGERY
Payer: MEDICARE

## 2019-08-08 ENCOUNTER — HOSPITAL ENCOUNTER (OUTPATIENT)
Dept: RADIOLOGY | Facility: HOSPITAL | Age: 64
Discharge: HOME | End: 2019-08-08
Attending: FAMILY MEDICINE
Payer: MEDICARE

## 2019-08-08 ENCOUNTER — CLINICAL SUPPORT (OUTPATIENT)
Dept: CARDIOLOGY | Facility: HOSPITAL | Age: 64
End: 2019-08-08
Attending: FAMILY MEDICINE
Payer: MEDICARE

## 2019-08-08 VITALS — HEIGHT: 63 IN | BODY MASS INDEX: 47.16 KG/M2 | WEIGHT: 266.19 LBS

## 2019-08-08 DIAGNOSIS — M17.11 PRIMARY OSTEOARTHRITIS OF RIGHT KNEE: Primary | ICD-10-CM

## 2019-08-08 DIAGNOSIS — R07.9 CHEST PAIN, UNSPECIFIED: ICD-10-CM

## 2019-08-08 LAB
CV STRESS BASE HR: 70 BPM
DIASTOLIC BLOOD PRESSURE: 80 MMHG
OHS CV CPX 85 PERCENT MAX PREDICTED HEART RATE MALE: 126
OHS CV CPX MAX PREDICTED HEART RATE: 149
OHS CV CPX PATIENT IS FEMALE: 1
OHS CV CPX PATIENT IS MALE: 0
OHS CV CPX PEAK DIASTOLIC BLOOD PRESSURE: 73 MMHG
OHS CV CPX PEAK HEAR RATE: 92 BPM
OHS CV CPX PEAK RATE PRESSURE PRODUCT: NORMAL
OHS CV CPX PEAK SYSTOLIC BLOOD PRESSURE: 139 MMHG
OHS CV CPX PERCENT MAX PREDICTED HEART RATE ACHIEVED: 62
OHS CV CPX RATE PRESSURE PRODUCT PRESENTING: 9240
STRESS ECHO TARGET HR: 131.75 BPM
SYSTOLIC BLOOD PRESSURE: 132 MMHG

## 2019-08-08 PROCEDURE — 63600175 PHARM REV CODE 636 W HCPCS: Performed by: FAMILY MEDICINE

## 2019-08-08 PROCEDURE — 85247 CLOT FACTOR VIII MULTIMETRIC: CPT

## 2019-08-08 PROCEDURE — 36415 COLL VENOUS BLD VENIPUNCTURE: CPT

## 2019-08-08 PROCEDURE — 85246 CLOT FACTOR VIII VW ANTIGEN: CPT

## 2019-08-08 PROCEDURE — A9502 TC99M TETROFOSMIN: HCPCS

## 2019-08-08 PROCEDURE — 85390 FIBRINOLYSINS SCREEN I&R: CPT

## 2019-08-08 PROCEDURE — 85240 CLOT FACTOR VIII AHG 1 STAGE: CPT

## 2019-08-08 PROCEDURE — 93017 CV STRESS TEST TRACING ONLY: CPT

## 2019-08-08 RX ORDER — REGADENOSON 0.08 MG/ML
0.4 INJECTION, SOLUTION INTRAVENOUS ONCE
Status: COMPLETED | OUTPATIENT
Start: 2019-08-08 | End: 2019-08-08

## 2019-08-08 RX ADMIN — REGADENOSON 0.4 MG: 0.08 INJECTION, SOLUTION INTRAVENOUS at 10:08

## 2019-08-13 ENCOUNTER — ANESTHESIA EVENT (OUTPATIENT)
Dept: SURGERY | Facility: HOSPITAL | Age: 64
End: 2019-08-13
Payer: MEDICARE

## 2019-08-13 ENCOUNTER — TELEPHONE (OUTPATIENT)
Dept: ORTHOPEDICS | Facility: CLINIC | Age: 64
End: 2019-08-13

## 2019-08-13 NOTE — OR NURSING
Per Dr Collins patient is cleared to have surgery. Informed Karen Camacho LPN at Dr. Chung office.

## 2019-08-13 NOTE — TELEPHONE ENCOUNTER
----- Message from Viviane Mcgill sent at 8/13/2019 11:17 AM CDT -----  Contact: self [phone]  Please call her, she wants to confirm her surgery.  Thank you!

## 2019-08-13 NOTE — TELEPHONE ENCOUNTER
Spoke to patient. Advised we are still waiting on her cardiac clearance, but we will be in touch with her Cardiologist to get that today. Also advised surgery will contact her this afternoon to let her know what time to arrive for surgery tomorrow. Verbalized understanding.

## 2019-08-14 ENCOUNTER — HOSPITAL ENCOUNTER (OUTPATIENT)
Facility: HOSPITAL | Age: 64
Discharge: HOME | End: 2019-08-15
Attending: ORTHOPAEDIC SURGERY | Admitting: ORTHOPAEDIC SURGERY
Payer: MEDICARE

## 2019-08-14 ENCOUNTER — ANESTHESIA (OUTPATIENT)
Dept: SURGERY | Facility: HOSPITAL | Age: 64
End: 2019-08-14
Payer: MEDICARE

## 2019-08-14 DIAGNOSIS — M17.11 PRIMARY OSTEOARTHRITIS OF RIGHT KNEE: Primary | ICD-10-CM

## 2019-08-14 DIAGNOSIS — Z01.818 PRE-OP EXAM: ICD-10-CM

## 2019-08-14 PROBLEM — J45.909 MILD ASTHMA WITHOUT COMPLICATION: Status: ACTIVE | Noted: 2019-08-14

## 2019-08-14 PROBLEM — J44.9 COPD (CHRONIC OBSTRUCTIVE PULMONARY DISEASE): Status: ACTIVE | Noted: 2019-08-14

## 2019-08-14 PROBLEM — M79.7 FIBROMYALGIA: Status: ACTIVE | Noted: 2019-08-14

## 2019-08-14 PROBLEM — I10 ESSENTIAL HYPERTENSION: Status: ACTIVE | Noted: 2019-08-14

## 2019-08-14 PROBLEM — E66.01 MORBID OBESITY: Status: ACTIVE | Noted: 2019-08-14

## 2019-08-14 LAB — SODIUM SERPL-SCNC: 132 MMOL/L (ref 136–145)

## 2019-08-14 PROCEDURE — 63600175 PHARM REV CODE 636 W HCPCS: Performed by: ORTHOPAEDIC SURGERY

## 2019-08-14 PROCEDURE — 25000003 PHARM REV CODE 250: Performed by: NURSE ANESTHETIST, CERTIFIED REGISTERED

## 2019-08-14 PROCEDURE — 27447 PR TOTAL KNEE ARTHROPLASTY: ICD-10-PCS | Mod: RT,,, | Performed by: ORTHOPAEDIC SURGERY

## 2019-08-14 PROCEDURE — 63600175 PHARM REV CODE 636 W HCPCS

## 2019-08-14 PROCEDURE — D9220A PRA ANESTHESIA: ICD-10-PCS | Mod: ANES,,, | Performed by: ANESTHESIOLOGY

## 2019-08-14 PROCEDURE — D9220A PRA ANESTHESIA: Mod: ANES,,, | Performed by: ANESTHESIOLOGY

## 2019-08-14 PROCEDURE — 27201423 OPTIME MED/SURG SUP & DEVICES STERILE SUPPLY: Performed by: ORTHOPAEDIC SURGERY

## 2019-08-14 PROCEDURE — 99900104 DSU ONLY-NO CHARGE-EA ADD'L HR (STAT): Performed by: ORTHOPAEDIC SURGERY

## 2019-08-14 PROCEDURE — 63600175 PHARM REV CODE 636 W HCPCS: Performed by: HOSPITALIST

## 2019-08-14 PROCEDURE — 27800903 OPTIME MED/SURG SUP & DEVICES OTHER IMPLANTS: Performed by: ORTHOPAEDIC SURGERY

## 2019-08-14 PROCEDURE — 94640 AIRWAY INHALATION TREATMENT: CPT

## 2019-08-14 PROCEDURE — 63600175 PHARM REV CODE 636 W HCPCS: Performed by: ANESTHESIOLOGY

## 2019-08-14 PROCEDURE — 25000003 PHARM REV CODE 250: Performed by: ANESTHESIOLOGY

## 2019-08-14 PROCEDURE — 64447 NJX AA&/STRD FEMORAL NRV IMG: CPT | Mod: 59,RT,, | Performed by: ANESTHESIOLOGY

## 2019-08-14 PROCEDURE — 27000221 HC OXYGEN, UP TO 24 HOURS

## 2019-08-14 PROCEDURE — 99900103 DSU ONLY-NO CHARGE-INITIAL HR (STAT): Performed by: ORTHOPAEDIC SURGERY

## 2019-08-14 PROCEDURE — 36415 COLL VENOUS BLD VENIPUNCTURE: CPT

## 2019-08-14 PROCEDURE — D9220A PRA ANESTHESIA: ICD-10-PCS | Mod: CRNA,,, | Performed by: NURSE ANESTHETIST, CERTIFIED REGISTERED

## 2019-08-14 PROCEDURE — 36000711: Performed by: ORTHOPAEDIC SURGERY

## 2019-08-14 PROCEDURE — 99900035 HC TECH TIME PER 15 MIN (STAT)

## 2019-08-14 PROCEDURE — 25000003 PHARM REV CODE 250: Performed by: HOSPITALIST

## 2019-08-14 PROCEDURE — 76942 PR U/S GUIDANCE FOR NEEDLE GUIDANCE: ICD-10-PCS | Mod: 26,,, | Performed by: ANESTHESIOLOGY

## 2019-08-14 PROCEDURE — 25000003 PHARM REV CODE 250: Performed by: ORTHOPAEDIC SURGERY

## 2019-08-14 PROCEDURE — 71000039 HC RECOVERY, EACH ADD'L HOUR: Performed by: ORTHOPAEDIC SURGERY

## 2019-08-14 PROCEDURE — 63600175 PHARM REV CODE 636 W HCPCS: Performed by: NURSE ANESTHETIST, CERTIFIED REGISTERED

## 2019-08-14 PROCEDURE — C1713 ANCHOR/SCREW BN/BN,TIS/BN: HCPCS | Performed by: ORTHOPAEDIC SURGERY

## 2019-08-14 PROCEDURE — 76942 ECHO GUIDE FOR BIOPSY: CPT | Mod: 26,,, | Performed by: ANESTHESIOLOGY

## 2019-08-14 PROCEDURE — 64447 NJX AA&/STRD FEMORAL NRV IMG: CPT | Performed by: ANESTHESIOLOGY

## 2019-08-14 PROCEDURE — 71000033 HC RECOVERY, INTIAL HOUR: Performed by: ORTHOPAEDIC SURGERY

## 2019-08-14 PROCEDURE — D9220A PRA ANESTHESIA: Mod: CRNA,,, | Performed by: NURSE ANESTHETIST, CERTIFIED REGISTERED

## 2019-08-14 PROCEDURE — 94799 UNLISTED PULMONARY SVC/PX: CPT

## 2019-08-14 PROCEDURE — 27447 TOTAL KNEE ARTHROPLASTY: CPT | Mod: RT,,, | Performed by: ORTHOPAEDIC SURGERY

## 2019-08-14 PROCEDURE — 37000009 HC ANESTHESIA EA ADD 15 MINS: Performed by: ORTHOPAEDIC SURGERY

## 2019-08-14 PROCEDURE — S0020 INJECTION, BUPIVICAINE HYDRO: HCPCS | Performed by: ANESTHESIOLOGY

## 2019-08-14 PROCEDURE — 37000008 HC ANESTHESIA 1ST 15 MINUTES: Performed by: ORTHOPAEDIC SURGERY

## 2019-08-14 PROCEDURE — C1776 JOINT DEVICE (IMPLANTABLE): HCPCS | Performed by: ORTHOPAEDIC SURGERY

## 2019-08-14 PROCEDURE — 64447 PR NERVE BLOCK INJ, ANES/STEROID, FEMORAL, INCL IMAG GUIDANCE: ICD-10-PCS | Mod: 59,RT,, | Performed by: ANESTHESIOLOGY

## 2019-08-14 PROCEDURE — 84295 ASSAY OF SERUM SODIUM: CPT

## 2019-08-14 PROCEDURE — 36000710: Performed by: ORTHOPAEDIC SURGERY

## 2019-08-14 PROCEDURE — 94761 N-INVAS EAR/PLS OXIMETRY MLT: CPT

## 2019-08-14 DEVICE — BASEPLATE TIBIAL GENESIS SZ4: Type: IMPLANTABLE DEVICE | Site: KNEE | Status: FUNCTIONAL

## 2019-08-14 DEVICE — PIN NON RIMMED SPEED 65MM: Type: IMPLANTABLE DEVICE | Site: KNEE | Status: FUNCTIONAL

## 2019-08-14 DEVICE — CEMENT BONE RDPQ 40G PDR 20ML: Type: IMPLANTABLE DEVICE | Site: KNEE | Status: FUNCTIONAL

## 2019-08-14 DEVICE — COMP PATELLA GENESIS II 8.5X29: Type: IMPLANTABLE DEVICE | Site: KNEE | Status: FUNCTIONAL

## 2019-08-14 DEVICE — INSERT TIBIAL SZ 3-4 11MM XLPE: Type: IMPLANTABLE DEVICE | Site: KNEE | Status: FUNCTIONAL

## 2019-08-14 DEVICE — PINS STERILE RIM SPEED 45MM: Type: IMPLANTABLE DEVICE | Site: KNEE | Status: FUNCTIONAL

## 2019-08-14 RX ORDER — ENOXAPARIN SODIUM 100 MG/ML
40 INJECTION SUBCUTANEOUS EVERY 24 HOURS
Status: DISCONTINUED | OUTPATIENT
Start: 2019-08-14 | End: 2019-08-14 | Stop reason: DRUGHIGH

## 2019-08-14 RX ORDER — ALBUTEROL SULFATE 90 UG/1
2 AEROSOL, METERED RESPIRATORY (INHALATION) EVERY 6 HOURS PRN
Status: DISCONTINUED | OUTPATIENT
Start: 2019-08-14 | End: 2019-08-14 | Stop reason: CLARIF

## 2019-08-14 RX ORDER — HYDROCODONE BITARTRATE AND ACETAMINOPHEN 10; 325 MG/1; MG/1
1 TABLET ORAL EVERY 4 HOURS PRN
Status: DISCONTINUED | OUTPATIENT
Start: 2019-08-14 | End: 2019-08-15 | Stop reason: HOSPADM

## 2019-08-14 RX ORDER — ACETAMINOPHEN 325 MG/1
650 TABLET ORAL EVERY 4 HOURS PRN
Status: DISCONTINUED | OUTPATIENT
Start: 2019-08-14 | End: 2019-08-15 | Stop reason: HOSPADM

## 2019-08-14 RX ORDER — PREGABALIN 75 MG/1
150 CAPSULE ORAL NIGHTLY
Status: DISCONTINUED | OUTPATIENT
Start: 2019-08-14 | End: 2019-08-15 | Stop reason: HOSPADM

## 2019-08-14 RX ORDER — ONDANSETRON HYDROCHLORIDE 2 MG/ML
INJECTION, SOLUTION INTRAMUSCULAR; INTRAVENOUS
Status: DISCONTINUED | OUTPATIENT
Start: 2019-08-14 | End: 2019-08-14

## 2019-08-14 RX ORDER — LIDOCAINE HYDROCHLORIDE 10 MG/ML
1 INJECTION, SOLUTION EPIDURAL; INFILTRATION; INTRACAUDAL; PERINEURAL ONCE
Status: COMPLETED | OUTPATIENT
Start: 2019-08-14 | End: 2019-08-14

## 2019-08-14 RX ORDER — FLUOXETINE HYDROCHLORIDE 20 MG/1
40 CAPSULE ORAL DAILY
Status: DISCONTINUED | OUTPATIENT
Start: 2019-08-15 | End: 2019-08-15 | Stop reason: HOSPADM

## 2019-08-14 RX ORDER — IBUPROFEN 400 MG/1
800 TABLET ORAL 3 TIMES DAILY
Status: DISCONTINUED | OUTPATIENT
Start: 2019-08-14 | End: 2019-08-15 | Stop reason: HOSPADM

## 2019-08-14 RX ORDER — BUPIVACAINE HYDROCHLORIDE 5 MG/ML
INJECTION, SOLUTION EPIDURAL; INTRACAUDAL
Status: COMPLETED | OUTPATIENT
Start: 2019-08-14 | End: 2019-08-14

## 2019-08-14 RX ORDER — METOCLOPRAMIDE HYDROCHLORIDE 5 MG/ML
10 INJECTION INTRAMUSCULAR; INTRAVENOUS EVERY 10 MIN PRN
Status: DISCONTINUED | OUTPATIENT
Start: 2019-08-14 | End: 2019-08-14 | Stop reason: HOSPADM

## 2019-08-14 RX ORDER — ACETAMINOPHEN 10 MG/ML
1000 INJECTION, SOLUTION INTRAVENOUS EVERY 8 HOURS
Status: COMPLETED | OUTPATIENT
Start: 2019-08-14 | End: 2019-08-14

## 2019-08-14 RX ORDER — HYDROCHLOROTHIAZIDE 25 MG/1
25 TABLET ORAL DAILY
Status: DISCONTINUED | OUTPATIENT
Start: 2019-08-15 | End: 2019-08-15 | Stop reason: HOSPADM

## 2019-08-14 RX ORDER — CEFAZOLIN SODIUM 1 G/50ML
1 SOLUTION INTRAVENOUS
Status: COMPLETED | OUTPATIENT
Start: 2019-08-14 | End: 2019-08-15

## 2019-08-14 RX ORDER — PROPOFOL 10 MG/ML
VIAL (ML) INTRAVENOUS
Status: DISCONTINUED | OUTPATIENT
Start: 2019-08-14 | End: 2019-08-14

## 2019-08-14 RX ORDER — CELECOXIB 100 MG/1
200 CAPSULE ORAL ONCE
Status: COMPLETED | OUTPATIENT
Start: 2019-08-14 | End: 2019-08-14

## 2019-08-14 RX ORDER — OXYCODONE HYDROCHLORIDE 5 MG/1
5 TABLET ORAL
Status: DISCONTINUED | OUTPATIENT
Start: 2019-08-14 | End: 2019-08-15 | Stop reason: HOSPADM

## 2019-08-14 RX ORDER — TRANEXAMIC ACID 100 MG/ML
INJECTION, SOLUTION INTRAVENOUS
Status: DISCONTINUED | OUTPATIENT
Start: 2019-08-14 | End: 2019-08-14

## 2019-08-14 RX ORDER — ENOXAPARIN SODIUM 100 MG/ML
40 INJECTION SUBCUTANEOUS EVERY 12 HOURS
Status: DISCONTINUED | OUTPATIENT
Start: 2019-08-14 | End: 2019-08-15 | Stop reason: HOSPADM

## 2019-08-14 RX ORDER — ONDANSETRON 2 MG/ML
4 INJECTION INTRAMUSCULAR; INTRAVENOUS EVERY 12 HOURS PRN
Status: DISCONTINUED | OUTPATIENT
Start: 2019-08-14 | End: 2019-08-15 | Stop reason: HOSPADM

## 2019-08-14 RX ORDER — LISINOPRIL 10 MG/1
20 TABLET ORAL DAILY
Status: DISCONTINUED | OUTPATIENT
Start: 2019-08-15 | End: 2019-08-15 | Stop reason: HOSPADM

## 2019-08-14 RX ORDER — OXYCODONE HCL 10 MG/1
10 TABLET, FILM COATED, EXTENDED RELEASE ORAL ONCE
Status: COMPLETED | OUTPATIENT
Start: 2019-08-14 | End: 2019-08-14

## 2019-08-14 RX ORDER — ALBUTEROL SULFATE 2.5 MG/.5ML
2.5 SOLUTION RESPIRATORY (INHALATION) EVERY 4 HOURS
Status: DISCONTINUED | OUTPATIENT
Start: 2019-08-14 | End: 2019-08-14 | Stop reason: CLARIF

## 2019-08-14 RX ORDER — ONDANSETRON 2 MG/ML
4 INJECTION INTRAMUSCULAR; INTRAVENOUS DAILY PRN
Status: DISCONTINUED | OUTPATIENT
Start: 2019-08-14 | End: 2019-08-14 | Stop reason: HOSPADM

## 2019-08-14 RX ORDER — FENTANYL CITRATE 50 UG/ML
INJECTION, SOLUTION INTRAMUSCULAR; INTRAVENOUS
Status: DISCONTINUED | OUTPATIENT
Start: 2019-08-14 | End: 2019-08-14

## 2019-08-14 RX ORDER — HYDROMORPHONE HYDROCHLORIDE 2 MG/ML
0.2 INJECTION, SOLUTION INTRAMUSCULAR; INTRAVENOUS; SUBCUTANEOUS EVERY 5 MIN PRN
Status: DISCONTINUED | OUTPATIENT
Start: 2019-08-14 | End: 2019-08-14 | Stop reason: HOSPADM

## 2019-08-14 RX ORDER — FERROUS SULFATE 325(65) MG
325 TABLET ORAL
Status: ON HOLD | COMMUNITY
End: 2019-10-07 | Stop reason: HOSPADM

## 2019-08-14 RX ORDER — MUPIROCIN 20 MG/G
OINTMENT TOPICAL
Status: DISCONTINUED | OUTPATIENT
Start: 2019-08-14 | End: 2019-08-14 | Stop reason: HOSPADM

## 2019-08-14 RX ORDER — MIDAZOLAM HYDROCHLORIDE 1 MG/ML
INJECTION, SOLUTION INTRAMUSCULAR; INTRAVENOUS
Status: DISCONTINUED | OUTPATIENT
Start: 2019-08-14 | End: 2019-08-14

## 2019-08-14 RX ORDER — CELECOXIB 100 MG/1
400 CAPSULE ORAL ONCE
Status: DISCONTINUED | OUTPATIENT
Start: 2019-08-14 | End: 2019-08-14

## 2019-08-14 RX ORDER — NEOSTIGMINE METHYLSULFATE 1 MG/ML
INJECTION, SOLUTION INTRAVENOUS
Status: DISCONTINUED | OUTPATIENT
Start: 2019-08-14 | End: 2019-08-14

## 2019-08-14 RX ORDER — FERROUS SULFATE 325(65) MG
325 TABLET, DELAYED RELEASE (ENTERIC COATED) ORAL DAILY
Status: DISCONTINUED | OUTPATIENT
Start: 2019-08-15 | End: 2019-08-15 | Stop reason: HOSPADM

## 2019-08-14 RX ORDER — OXYCODONE HCL 10 MG/1
10 TABLET, FILM COATED, EXTENDED RELEASE ORAL EVERY 12 HOURS
Status: DISCONTINUED | OUTPATIENT
Start: 2019-08-14 | End: 2019-08-15 | Stop reason: HOSPADM

## 2019-08-14 RX ORDER — CELECOXIB 100 MG/1
200 CAPSULE ORAL DAILY
Status: DISCONTINUED | OUTPATIENT
Start: 2019-08-15 | End: 2019-08-14

## 2019-08-14 RX ORDER — SODIUM CHLORIDE, SODIUM LACTATE, POTASSIUM CHLORIDE, CALCIUM CHLORIDE 600; 310; 30; 20 MG/100ML; MG/100ML; MG/100ML; MG/100ML
INJECTION, SOLUTION INTRAVENOUS CONTINUOUS
Status: DISCONTINUED | OUTPATIENT
Start: 2019-08-14 | End: 2019-08-14

## 2019-08-14 RX ORDER — DEXAMETHASONE SODIUM PHOSPHATE 4 MG/ML
INJECTION, SOLUTION INTRA-ARTICULAR; INTRALESIONAL; INTRAMUSCULAR; INTRAVENOUS; SOFT TISSUE
Status: DISCONTINUED | OUTPATIENT
Start: 2019-08-14 | End: 2019-08-14

## 2019-08-14 RX ORDER — LOVASTATIN 20 MG/1
20 TABLET ORAL NIGHTLY
Status: DISCONTINUED | OUTPATIENT
Start: 2019-08-14 | End: 2019-08-15 | Stop reason: HOSPADM

## 2019-08-14 RX ORDER — PREGABALIN 75 MG/1
75 CAPSULE ORAL ONCE
Status: COMPLETED | OUTPATIENT
Start: 2019-08-14 | End: 2019-08-14

## 2019-08-14 RX ORDER — CEFAZOLIN SODIUM 1 G/50ML
1 SOLUTION INTRAVENOUS
Status: DISCONTINUED | OUTPATIENT
Start: 2019-08-14 | End: 2019-08-14

## 2019-08-14 RX ORDER — RAMELTEON 8 MG/1
8 TABLET ORAL NIGHTLY PRN
Status: DISCONTINUED | OUTPATIENT
Start: 2019-08-14 | End: 2019-08-15 | Stop reason: HOSPADM

## 2019-08-14 RX ORDER — PHENYLEPHRINE HYDROCHLORIDE 10 MG/ML
INJECTION INTRAVENOUS
Status: DISCONTINUED | OUTPATIENT
Start: 2019-08-14 | End: 2019-08-14

## 2019-08-14 RX ORDER — CEFAZOLIN SODIUM 2 G/50ML
2 SOLUTION INTRAVENOUS
Status: COMPLETED | OUTPATIENT
Start: 2019-08-14 | End: 2019-08-14

## 2019-08-14 RX ORDER — TRANEXAMIC ACID 100 MG/ML
INJECTION, SOLUTION INTRAVENOUS
Status: DISCONTINUED | OUTPATIENT
Start: 2019-08-14 | End: 2019-08-14 | Stop reason: HOSPADM

## 2019-08-14 RX ORDER — MORPHINE SULFATE 2 MG/ML
2 INJECTION, SOLUTION INTRAMUSCULAR; INTRAVENOUS
Status: DISCONTINUED | OUTPATIENT
Start: 2019-08-14 | End: 2019-08-15 | Stop reason: HOSPADM

## 2019-08-14 RX ORDER — ALBUTEROL SULFATE 2.5 MG/.5ML
2.5 SOLUTION RESPIRATORY (INHALATION) EVERY 6 HOURS PRN
Status: DISCONTINUED | OUTPATIENT
Start: 2019-08-14 | End: 2019-08-15

## 2019-08-14 RX ORDER — HYDROMORPHONE HYDROCHLORIDE 2 MG/ML
INJECTION, SOLUTION INTRAMUSCULAR; INTRAVENOUS; SUBCUTANEOUS
Status: DISCONTINUED | OUTPATIENT
Start: 2019-08-14 | End: 2019-08-14

## 2019-08-14 RX ORDER — HYDROCODONE BITARTRATE AND ACETAMINOPHEN 5; 325 MG/1; MG/1
1 TABLET ORAL EVERY 4 HOURS PRN
Status: DISCONTINUED | OUTPATIENT
Start: 2019-08-14 | End: 2019-08-15 | Stop reason: HOSPADM

## 2019-08-14 RX ORDER — GLYCOPYRROLATE 0.2 MG/ML
INJECTION INTRAMUSCULAR; INTRAVENOUS
Status: DISCONTINUED | OUTPATIENT
Start: 2019-08-14 | End: 2019-08-14

## 2019-08-14 RX ORDER — LIDOCAINE HCL/PF 100 MG/5ML
SYRINGE (ML) INTRAVENOUS
Status: DISCONTINUED | OUTPATIENT
Start: 2019-08-14 | End: 2019-08-14

## 2019-08-14 RX ADMIN — LOVASTATIN 20 MG: 20 TABLET ORAL at 08:08

## 2019-08-14 RX ADMIN — RAMELTEON 8 MG: 8 TABLET, FILM COATED ORAL at 08:08

## 2019-08-14 RX ADMIN — ENOXAPARIN SODIUM 40 MG: 100 INJECTION SUBCUTANEOUS at 08:08

## 2019-08-14 RX ADMIN — ONDANSETRON 4 MG: 2 INJECTION, SOLUTION INTRAMUSCULAR; INTRAVENOUS at 01:08

## 2019-08-14 RX ADMIN — LIDOCAINE HYDROCHLORIDE 100 MG: 20 INJECTION, SOLUTION INTRAVENOUS at 03:08

## 2019-08-14 RX ADMIN — SODIUM CHLORIDE, SODIUM LACTATE, POTASSIUM CHLORIDE, AND CALCIUM CHLORIDE: .6; .31; .03; .02 INJECTION, SOLUTION INTRAVENOUS at 02:08

## 2019-08-14 RX ADMIN — LIDOCAINE HYDROCHLORIDE 10 MG: 10 INJECTION, SOLUTION EPIDURAL; INFILTRATION; INTRACAUDAL; PERINEURAL at 11:08

## 2019-08-14 RX ADMIN — CELECOXIB 200 MG: 100 CAPSULE ORAL at 10:08

## 2019-08-14 RX ADMIN — HYDROMORPHONE HYDROCHLORIDE 0.2 MG: 2 INJECTION, SOLUTION INTRAMUSCULAR; INTRAVENOUS; SUBCUTANEOUS at 03:08

## 2019-08-14 RX ADMIN — SODIUM CHLORIDE, SODIUM LACTATE, POTASSIUM CHLORIDE, AND CALCIUM CHLORIDE: .6; .31; .03; .02 INJECTION, SOLUTION INTRAVENOUS at 01:08

## 2019-08-14 RX ADMIN — LIDOCAINE HYDROCHLORIDE 50 MG: 20 INJECTION, SOLUTION INTRAVENOUS at 12:08

## 2019-08-14 RX ADMIN — PREGABALIN 75 MG: 75 CAPSULE ORAL at 10:08

## 2019-08-14 RX ADMIN — IBUPROFEN 800 MG: 400 TABLET ORAL at 08:08

## 2019-08-14 RX ADMIN — ACETAMINOPHEN 1000 MG: 10 INJECTION, SOLUTION INTRAVENOUS at 11:08

## 2019-08-14 RX ADMIN — NEOSTIGMINE METHYLSULFATE 5 MG: 1 INJECTION INTRAVENOUS at 02:08

## 2019-08-14 RX ADMIN — FENTANYL CITRATE 50 MCG: 50 INJECTION, SOLUTION INTRAMUSCULAR; INTRAVENOUS at 11:08

## 2019-08-14 RX ADMIN — SODIUM CHLORIDE, SODIUM LACTATE, POTASSIUM CHLORIDE, AND CALCIUM CHLORIDE: .6; .31; .03; .02 INJECTION, SOLUTION INTRAVENOUS at 11:08

## 2019-08-14 RX ADMIN — ACETAMINOPHEN 1000 MG: 10 INJECTION, SOLUTION INTRAVENOUS at 08:08

## 2019-08-14 RX ADMIN — PROPOFOL 100 MG: 10 INJECTION, EMULSION INTRAVENOUS at 12:08

## 2019-08-14 RX ADMIN — MIDAZOLAM 2 MG: 1 INJECTION INTRAMUSCULAR; INTRAVENOUS at 12:08

## 2019-08-14 RX ADMIN — MUPIROCIN: 20 OINTMENT TOPICAL at 11:08

## 2019-08-14 RX ADMIN — HYDROCODONE BITARTRATE AND ACETAMINOPHEN 1 TABLET: 10; 325 TABLET ORAL at 06:08

## 2019-08-14 RX ADMIN — OXYCODONE HYDROCHLORIDE 10 MG: 10 TABLET, FILM COATED, EXTENDED RELEASE ORAL at 10:08

## 2019-08-14 RX ADMIN — PHENYLEPHRINE HYDROCHLORIDE 200 MCG: 10 INJECTION INTRAVENOUS at 02:08

## 2019-08-14 RX ADMIN — PREGABALIN 150 MG: 75 CAPSULE ORAL at 08:08

## 2019-08-14 RX ADMIN — GLYCOPYRROLATE 0.2 MG: 0.2 INJECTION, SOLUTION INTRAMUSCULAR; INTRAVENOUS at 01:08

## 2019-08-14 RX ADMIN — CEFAZOLIN SODIUM 1 G: 1 SOLUTION INTRAVENOUS at 08:08

## 2019-08-14 RX ADMIN — FENTANYL CITRATE 50 MCG: 50 INJECTION, SOLUTION INTRAMUSCULAR; INTRAVENOUS at 12:08

## 2019-08-14 RX ADMIN — CEFAZOLIN SODIUM 2 G: 2 SOLUTION INTRAVENOUS at 01:08

## 2019-08-14 RX ADMIN — OXYCODONE HYDROCHLORIDE 10 MG: 10 TABLET, FILM COATED, EXTENDED RELEASE ORAL at 08:08

## 2019-08-14 RX ADMIN — TRANEXAMIC ACID 1000 MG: 100 INJECTION, SOLUTION INTRAVENOUS at 01:08

## 2019-08-14 RX ADMIN — HYDROMORPHONE HYDROCHLORIDE 0.5 MG: 2 INJECTION, SOLUTION INTRAMUSCULAR; INTRAVENOUS; SUBCUTANEOUS at 01:08

## 2019-08-14 RX ADMIN — DEXAMETHASONE SODIUM PHOSPHATE 8 MG: 4 INJECTION, SOLUTION INTRAMUSCULAR; INTRAVENOUS at 01:08

## 2019-08-14 RX ADMIN — BUPIVACAINE HYDROCHLORIDE 30 ML: 5 INJECTION, SOLUTION EPIDURAL; INTRACAUDAL; PERINEURAL at 11:08

## 2019-08-14 RX ADMIN — MIDAZOLAM 2 MG: 1 INJECTION INTRAMUSCULAR; INTRAVENOUS at 11:08

## 2019-08-14 RX ADMIN — GLYCOPYRROLATE 0.8 MG: 0.2 INJECTION, SOLUTION INTRAMUSCULAR; INTRAVENOUS at 02:08

## 2019-08-14 NOTE — ANESTHESIA POSTPROCEDURE EVALUATION
Anesthesia Post Evaluation    Patient: Iris Mueller    Procedure(s) Performed: Procedure(s) (LRB):  ARTHROPLASTY, KNEE (Right)    Final Anesthesia Type: general  Patient location during evaluation: PACU  Patient participation: Yes- Able to Participate  Level of consciousness: awake and alert  Post-procedure vital signs: reviewed and stable  Pain management: adequate  Airway patency: patent  PONV status at discharge: No PONV  Anesthetic complications: no      Cardiovascular status: blood pressure returned to baseline  Respiratory status: unassisted  Hydration status: euvolemic  Follow-up not needed.          Vitals Value Taken Time   /74 8/14/2019  4:25 PM   Temp 36.7 °C (98 °F) 8/14/2019  4:25 PM   Pulse 75 8/14/2019  4:25 PM   Resp 16 8/14/2019  4:25 PM   SpO2 98 % 8/14/2019  4:25 PM         Event Time     Out of Recovery 16:50:00          Pain/Ad Score: Pain Rating Prior to Med Admin: 8 (8/14/2019  6:28 PM)  Ad Score: 8 (8/14/2019  4:20 PM)

## 2019-08-14 NOTE — PLAN OF CARE
Patient transferred to room 402.  Report to RACHANA Dee.  Released from Anesthesia.  Family at bedside.  Dressing CDI.  NAD noted.  VSS.

## 2019-08-14 NOTE — BRIEF OP NOTE
Dictation #1  MRN:[id number]  CSN:[id number]   [id number]        [facility]  Brief Operative Note    SUMMARY     Surgery Date: 8/14/2019     Surgeon(s) and Role:     * Delio Chung MD - Primary    Assisting Surgeon: None    Pre-op Diagnosis:  Pre-op exam [Z01.818]  Primary osteoarthritis of right knee [M17.11]    Post-op Diagnosis:  Post-Op Diagnosis Codes:     * Pre-op exam [Z01.818]     * Primary osteoarthritis of right knee [M17.11]    Procedure(s) (LRB):  ARTHROPLASTY, KNEE (Right)    Anesthesia: General    Description of Procedure: right total knee arthroplasty    Description of the findings of the procedure: See Dictation     Estimated Blood Loss: 20 mL         Specimens:   Specimen (12h ago, onward)    None

## 2019-08-14 NOTE — TRANSFER OF CARE
"Anesthesia Transfer of Care Note    Patient: Iris Mueller    Procedure(s) Performed: Procedure(s) (LRB):  ARTHROPLASTY, KNEE (Right)    Patient location: PACU    Anesthesia Type: general    Transport from OR: Transported from OR on 2-3 L/min O2 by NC with adequate spontaneous ventilation    Post pain: adequate analgesia    Post assessment: no apparent anesthetic complications and tolerated procedure well    Post vital signs: stable    Level of consciousness: sedated and responds to stimulation    Nausea/Vomiting: no nausea/vomiting    Complications: none    Transfer of care protocol was followed      Last vitals:   Visit Vitals  BP (!) 108/55   Pulse 70   Temp 36.6 °C (97.9 °F)   Resp 18   Ht 5' 2" (1.575 m)   Wt 120.2 kg (265 lb)   SpO2 99%   Breastfeeding? No   BMI 48.47 kg/m²     "

## 2019-08-14 NOTE — ANESTHESIA PROCEDURE NOTES
Peripheral Block    Patient location during procedure: pre-op   Block not for primary anesthetic.  Reason for block: at surgeon's request and post-op pain management   Post-op Pain Location: Knee right  Start time: 8/14/2019 11:38 AM  Timeout: 8/14/2019 11:36 AM   End time: 8/14/2019 11:45 AM    Staffing  Authorizing Provider: Blair Dean MD  Performing Provider: Blair Dean MD    Preanesthetic Checklist  Completed: patient identified, site marked, surgical consent, pre-op evaluation, timeout performed, IV checked, risks and benefits discussed and monitors and equipment checked  Peripheral Block  Patient position: supine  Prep: ChloraPrep  Patient monitoring: heart rate, cardiac monitor, continuous pulse ox, continuous capnometry and frequent blood pressure checks  Block type: adductor canal  Laterality: right  Injection technique: single shot  Needle  Needle type: Stimuplex   Needle gauge: 21 G  Needle length: 4 in  Needle localization: anatomical landmarks and ultrasound guidance   -ultrasound image captured on disc.  Assessment  Injection assessment: negative aspiration, negative parasthesia and local visualized surrounding nerve  Paresthesia pain: none  Heart rate change: no  Slow fractionated injection: yes  Additional Notes  VSS.  [facility] RN monitoring vitals throughout procedure.  Patient tolerated procedure well.     Exparel 20 cc in Bup. 0.5%  10 cc mixture injected

## 2019-08-14 NOTE — H&P
Past Medical History:   Diagnosis Date    Asthma      Hypertension                 Past Surgical History:   Procedure Laterality Date     SECTION        TONSILLECTOMY                  Current Outpatient Medications   Medication Sig    ANORO ELLIPTA 62.5-25 mcg/actuation DsDv      FLUoxetine 40 MG capsule      fluticasone (FLONASE) 50 mcg/actuation nasal spray      lisinopril-hydrochlorothiazide (PRINZIDE,ZESTORETIC) 20-25 mg Tab Take 1 tablet by mouth once daily.    lovastatin (MEVACOR) 20 MG tablet Take 20 mg by mouth every evening.    VENTOLIN HFA 90 mcg/actuation inhaler        No current facility-administered medications for this visit.          Review of patient's allergies indicates:  No Known Allergies     History reviewed. No pertinent family history.     Social History               Socioeconomic History    Marital status:        Spouse name: Not on file    Number of children: Not on file    Years of education: Not on file    Highest education level: Not on file   Occupational History    Not on file   Social Needs    Financial resource strain: Not on file    Food insecurity:       Worry: Not on file       Inability: Not on file    Transportation needs:       Medical: Not on file       Non-medical: Not on file   Tobacco Use    Smoking status: Current Every Day Smoker    Smokeless tobacco: Never Used   Substance and Sexual Activity    Alcohol use: Not on file    Drug use: Not on file    Sexual activity: Not on file   Lifestyle    Physical activity:       Days per week: Not on file       Minutes per session: Not on file    Stress: Not on file   Relationships    Social connections:       Talks on phone: Not on file       Gets together: Not on file       Attends Adventism service: Not on file       Active member of club or organization: Not on file       Attends meetings of clubs or organizations: Not on file       Relationship status: Not on file   Other Topics Concern  "   Not on file   Social History Narrative    Not on file            Chief Complaint:       Chief Complaint   Patient presents with    Left Knee - Pain    Right Knee - Pain         Consulting Physician: No ref. provider found     History of present illness:     This is a 63 y.o. female who complains of moderate chronic bilateral knee pain for years. Denies current injury. Old injury right knee. Pain 9/10 and worse with use. Injections helped a little for a short time. NSAIDS and home exercise program have failed to improve symptoms.     Review of Systems:     Constitution: Denies chills, fever, and sweats.  HENT: Denies headaches or blurry vision.  Cardiovascular: Denies chest pain or irregular heart beat.  Respiratory: Denies cough or shortness of breath.  Gastrointestinal: Denies abdominal pain, nausea, or vomiting.  Musculoskeletal:  Denies muscle cramps.  Neurological: Denies dizziness or focal weakness.  Psychiatric/Behavioral: Normal mental status.  Hematologic/Lymphatic: Denies bleeding problem or easy bruising/bleeding.  Skin: Denies rash or suspicious lesions.     Examination:     Vital Signs:        Vitals:     06/04/19 1402   Resp: 18   Weight: 119.3 kg (263 lb)   Height: 5' 3" (1.6 m)   PainSc:   9         Body mass index is 46.59 kg/m².     This a well-developed, well nourished patient in no acute distress.     Alert and oriented x 3 and cooperative to examination.        Physical Exam: Left Knee Exam     Gait                              Antalgic     Skin  Rash:                           None  Scars:                          None     Inspection  Erythema:                    None  Bruising:                      None  Effusion:                      None  Masses:                       None  Lymphadenopathy:     None     Range of Motion:        0 to 130°     Medial Joint :       y  Lateral Joint :       n     Patellofemoral Tenderness:    Yes  Patellofemoral Crepitus:         " Yes     Lachman:                    Normal  Anterior Drawer:          Normal  Posterior Drawer:        Normal     Vanessa's:                 Negative  Apley's:                        Negative     Varus Stress:              Stable  Valgus Stress:             Stable     Strength:                     5/5     Pulses:                        Palpable  Sensation:                   Intact        Right Knee Exam     Gait                              Antalgic     Skin  Rash:                           None  Scars:                          None     Inspection  Erythema:                    None  Bruising:                      None  Effusion:                      None  Masses:                       None  Lymphadenopathy:     None     Range of Motion:        0 to 130°     Medial Joint :       y  Lateral Joint :       n     Patellofemoral Tenderness:    Yes  Patellofemoral Crepitus:         Yes     Lachman:                    Normal  Anterior Drawer:          Normal  Posterior Drawer:        Normal     Vanessa's:                 Negative  Apley's:                        Negative     Varus Stress:              Stable  Valgus Stress:             Stable     Strength:                     5/5     Pulses:                        Palpable  Sensation:                   Intact              Imaging: X-rays of right knee shows old plateau fracture with severe DJD and left shows moderate DJD.         Assessment: Pre-op exam  -     Case Request Operating Room: ARTHROPLASTY, KNEE     Primary osteoarthritis of right knee  -     Case Request Operating Room: ARTHROPLASTY, KNEE           Plan: She is requesting right TKA due to increase in pain. Has family history of blood clots.     After discussing the diagnosis and reviewing treatment options, the patient/family elected to proceed with surgical intervention.     In preparation for surgery:     A pre-operative clearance request was placed with primary care  physician.     Appropriate pre-operative labs were ordered.     An EKG examination was ordered.     A chest X-ray was ordered.     A pre-operative planning MRI study was ordered.     Pertinent risk factors and comorbidities for surgery were identified and reviewed, including HTN and clotting issues in family (Mother, brother)     Pre-operative antibiotics were ordered.     The risks, benefits, and alternatives to the procedure were explained to the patient/family including, but not limited to: incomplete pain relief, surgical failure, hardware failure, need for further procedures, damage to nerves, arteries, blood vessels and other structures, infection, Deep Vein Thrombosis (DVT), Pulmonary Embolus (PE), Complex Regional Pain Syndrome as well as general anesthetic complications including seizure, stroke, heart attack and even death. The patient/family understood these risks and wished to proceed and signed the informed consent. All questions were answered. No guarantees were implied or stated.     We will obtain the necessary clearances and schedule the patient for surgery.

## 2019-08-14 NOTE — ANESTHESIA PREPROCEDURE EVALUATION
08/14/2019  Iris Mueller is a 63 y.o., female.    Anesthesia Evaluation    I have reviewed the Patient Summary Reports.    I have reviewed the Nursing Notes.   I have reviewed the Medications.     Review of Systems  Anesthesia Hx:  Denies Family Hx of Anesthesia complications.   Denies Personal Hx of Anesthesia complications.   Social:  Smoker    Cardiovascular:   Hypertension ECG has been reviewed. Systolic heart murmur heard over the Aortic area   Pulmonary:   COPD, moderate Asthma moderate    Musculoskeletal:   Arthritis   Spine Disorders: lumbar Degenerative disease and Chronic Pain        Physical Exam  General:  Morbid Obesity, Malnutrition    Airway/Jaw/Neck:  Airway Findings: Mouth Opening: Small, but > 3cm Tongue: Normal  General Airway Assessment: Adult  Mallampati: III  Improves to II with phonation.  TM Distance: 4 - 6 cm  Jaw/Neck Findings:  Neck ROM: Extension Decreased, Mild      Dental:  Dental Findings: Upper Dentures, Lower Dentures   Chest/Lungs:  Chest/Lungs Findings: Decreased Breath Sounds Bilateral, Clear to auscultation     Heart/Vascular:  Heart Findings: Rate: Normal  Rhythm: Regular Rhythm  Sounds: Quiet  Heart Murmur  Systolic  Systolic Heart Murmur Description: R Upper Sternal Border, Radiates to Carotids  Systolic Heart Murmur Grade: Grade III             Anesthesia Plan  Type of Anesthesia, risks & benefits discussed:  Anesthesia Type:  general  Patient's Preference: refuses SAB  Intra-op Monitoring Plan: standard ASA monitors  Intra-op Monitoring Plan Comments:   Post Op Pain Control Plan: multimodal analgesia and peripheral nerve block  Post Op Pain Control Plan Comments:   Induction:    Beta Blocker:  Patient is not currently on a Beta-Blocker (No further documentation required).       Informed Consent: Patient understands risks and agrees with Anesthesia plan.   Questions answered. Anesthesia consent signed with patient.  ASA Score: 3     Day of Surgery Review of History & Physical:    H&P update referred to the surgeon.         Ready For Surgery From Anesthesia Perspective.

## 2019-08-15 ENCOUNTER — CLINICAL SUPPORT (OUTPATIENT)
Dept: SMOKING CESSATION | Facility: CLINIC | Age: 64
End: 2019-08-15
Payer: COMMERCIAL

## 2019-08-15 VITALS
BODY MASS INDEX: 48.76 KG/M2 | TEMPERATURE: 98 F | HEIGHT: 62 IN | HEART RATE: 70 BPM | RESPIRATION RATE: 17 BRPM | SYSTOLIC BLOOD PRESSURE: 135 MMHG | WEIGHT: 265 LBS | DIASTOLIC BLOOD PRESSURE: 60 MMHG | OXYGEN SATURATION: 98 %

## 2019-08-15 DIAGNOSIS — F17.200 NICOTINE DEPENDENCE: Primary | ICD-10-CM

## 2019-08-15 DIAGNOSIS — M17.11 PRIMARY OSTEOARTHRITIS OF RIGHT KNEE: Primary | ICD-10-CM

## 2019-08-15 PROBLEM — N28.9 ACUTE RENAL INSUFFICIENCY: Status: ACTIVE | Noted: 2019-08-15

## 2019-08-15 PROBLEM — E87.5 HYPERKALEMIA: Status: ACTIVE | Noted: 2019-08-15

## 2019-08-15 PROBLEM — J30.89 NON-SEASONAL ALLERGIC RHINITIS: Status: ACTIVE | Noted: 2019-08-15

## 2019-08-15 LAB
ANION GAP SERPL CALC-SCNC: 8 MMOL/L (ref 8–16)
BASOPHILS # BLD AUTO: 0.02 K/UL (ref 0–0.2)
BASOPHILS NFR BLD: 0.1 % (ref 0–1.9)
BUN SERPL-MCNC: 21 MG/DL (ref 8–23)
CALCIUM SERPL-MCNC: 9 MG/DL (ref 8.7–10.5)
CHLORIDE SERPL-SCNC: 99 MMOL/L (ref 95–110)
CO2 SERPL-SCNC: 25 MMOL/L (ref 23–29)
CREAT SERPL-MCNC: 1.4 MG/DL (ref 0.5–1.4)
DIFFERENTIAL METHOD: ABNORMAL
EOSINOPHIL # BLD AUTO: 0 K/UL (ref 0–0.5)
EOSINOPHIL NFR BLD: 0 % (ref 0–8)
ERYTHROCYTE [DISTWIDTH] IN BLOOD BY AUTOMATED COUNT: 13.6 % (ref 11.5–14.5)
EST. GFR  (AFRICAN AMERICAN): 45 ML/MIN/1.73 M^2
EST. GFR  (NON AFRICAN AMERICAN): 39 ML/MIN/1.73 M^2
GLUCOSE SERPL-MCNC: 119 MG/DL (ref 70–110)
HCT VFR BLD AUTO: 34.6 % (ref 37–48.5)
HGB BLD-MCNC: 11.1 G/DL (ref 12–16)
IMM GRANULOCYTES # BLD AUTO: 0.1 K/UL (ref 0–0.04)
LYMPHOCYTES # BLD AUTO: 0.9 K/UL (ref 1–4.8)
LYMPHOCYTES NFR BLD: 4.7 % (ref 18–48)
MAGNESIUM SERPL-MCNC: 2 MG/DL (ref 1.6–2.6)
MCH RBC QN AUTO: 30.7 PG (ref 27–31)
MCHC RBC AUTO-ENTMCNC: 32.1 G/DL (ref 32–36)
MCV RBC AUTO: 96 FL (ref 82–98)
MONOCYTES # BLD AUTO: 0.9 K/UL (ref 0.3–1)
MONOCYTES NFR BLD: 4.6 % (ref 4–15)
NEUTROPHILS # BLD AUTO: 17.7 K/UL (ref 1.8–7.7)
NEUTROPHILS NFR BLD: 90.1 % (ref 38–73)
NRBC BLD-RTO: 0 /100 WBC
PHOSPHATE SERPL-MCNC: 4.6 MG/DL (ref 2.7–4.5)
PLATELET # BLD AUTO: 433 K/UL (ref 150–350)
PMV BLD AUTO: 8.8 FL (ref 9.2–12.9)
POTASSIUM SERPL-SCNC: 5.5 MMOL/L (ref 3.5–5.1)
RBC # BLD AUTO: 3.61 M/UL (ref 4–5.4)
SODIUM SERPL-SCNC: 132 MMOL/L (ref 136–145)
WBC # BLD AUTO: 19.64 K/UL (ref 3.9–12.7)

## 2019-08-15 PROCEDURE — 84100 ASSAY OF PHOSPHORUS: CPT

## 2019-08-15 PROCEDURE — 97165 OT EVAL LOW COMPLEX 30 MIN: CPT

## 2019-08-15 PROCEDURE — 63600175 PHARM REV CODE 636 W HCPCS: Performed by: ORTHOPAEDIC SURGERY

## 2019-08-15 PROCEDURE — G8978 MOBILITY CURRENT STATUS: HCPCS | Mod: CL | Performed by: PHYSICAL THERAPIST

## 2019-08-15 PROCEDURE — 85025 COMPLETE CBC W/AUTO DIFF WBC: CPT

## 2019-08-15 PROCEDURE — 94799 UNLISTED PULMONARY SVC/PX: CPT

## 2019-08-15 PROCEDURE — 99900035 HC TECH TIME PER 15 MIN (STAT)

## 2019-08-15 PROCEDURE — 25000242 PHARM REV CODE 250 ALT 637 W/ HCPCS: Performed by: HOSPITALIST

## 2019-08-15 PROCEDURE — 83735 ASSAY OF MAGNESIUM: CPT

## 2019-08-15 PROCEDURE — 25000003 PHARM REV CODE 250: Performed by: ORTHOPAEDIC SURGERY

## 2019-08-15 PROCEDURE — G8987 SELF CARE CURRENT STATUS: HCPCS | Mod: CK

## 2019-08-15 PROCEDURE — 97530 THERAPEUTIC ACTIVITIES: CPT

## 2019-08-15 PROCEDURE — 97162 PT EVAL MOD COMPLEX 30 MIN: CPT | Performed by: PHYSICAL THERAPIST

## 2019-08-15 PROCEDURE — 99407 PR TOBACCO USE CESSATION INTENSIVE >10 MINUTES: ICD-10-PCS | Mod: S$GLB,,, | Performed by: HOSPITALIST

## 2019-08-15 PROCEDURE — 97110 THERAPEUTIC EXERCISES: CPT | Performed by: PHYSICAL THERAPIST

## 2019-08-15 PROCEDURE — G8979 MOBILITY GOAL STATUS: HCPCS | Mod: CJ | Performed by: PHYSICAL THERAPIST

## 2019-08-15 PROCEDURE — G8988 SELF CARE GOAL STATUS: HCPCS | Mod: CK

## 2019-08-15 PROCEDURE — 97530 THERAPEUTIC ACTIVITIES: CPT | Performed by: PHYSICAL THERAPIST

## 2019-08-15 PROCEDURE — 97116 GAIT TRAINING THERAPY: CPT | Mod: 59 | Performed by: PHYSICAL THERAPIST

## 2019-08-15 PROCEDURE — 99407 BEHAV CHNG SMOKING > 10 MIN: CPT | Mod: S$GLB,,, | Performed by: HOSPITALIST

## 2019-08-15 PROCEDURE — 27000221 HC OXYGEN, UP TO 24 HOURS

## 2019-08-15 PROCEDURE — 25000003 PHARM REV CODE 250: Performed by: HOSPITALIST

## 2019-08-15 PROCEDURE — 80048 BASIC METABOLIC PNL TOTAL CA: CPT

## 2019-08-15 PROCEDURE — 25000003 PHARM REV CODE 250: Performed by: ANESTHESIOLOGY

## 2019-08-15 PROCEDURE — 36415 COLL VENOUS BLD VENIPUNCTURE: CPT

## 2019-08-15 PROCEDURE — 97535 SELF CARE MNGMENT TRAINING: CPT | Mod: 59

## 2019-08-15 PROCEDURE — 63600175 PHARM REV CODE 636 W HCPCS: Performed by: HOSPITALIST

## 2019-08-15 PROCEDURE — 94761 N-INVAS EAR/PLS OXIMETRY MLT: CPT

## 2019-08-15 RX ORDER — FLUTICASONE PROPIONATE 50 MCG
2 SPRAY, SUSPENSION (ML) NASAL DAILY
Status: DISCONTINUED | OUTPATIENT
Start: 2019-08-15 | End: 2019-08-15 | Stop reason: HOSPADM

## 2019-08-15 RX ORDER — OXYCODONE AND ACETAMINOPHEN 10; 325 MG/1; MG/1
1 TABLET ORAL EVERY 6 HOURS PRN
Qty: 28 TABLET | Refills: 0 | Status: SHIPPED | OUTPATIENT
Start: 2019-08-15 | End: 2019-08-22 | Stop reason: SDUPTHER

## 2019-08-15 RX ORDER — ALBUTEROL SULFATE 2.5 MG/.5ML
2.5 SOLUTION RESPIRATORY (INHALATION) EVERY 4 HOURS
Status: DISCONTINUED | OUTPATIENT
Start: 2019-08-15 | End: 2019-08-15 | Stop reason: HOSPADM

## 2019-08-15 RX ORDER — IBUPROFEN 800 MG/1
800 TABLET ORAL EVERY 6 HOURS PRN
Qty: 90 TABLET | Refills: 0 | Status: SHIPPED | OUTPATIENT
Start: 2019-08-15 | End: 2019-08-29 | Stop reason: CLARIF

## 2019-08-15 RX ORDER — ALBUTEROL SULFATE 2.5 MG/.5ML
2.5 SOLUTION RESPIRATORY (INHALATION) EVERY 4 HOURS PRN
Status: DISCONTINUED | OUTPATIENT
Start: 2019-08-15 | End: 2019-08-15 | Stop reason: HOSPADM

## 2019-08-15 RX ORDER — CETIRIZINE HYDROCHLORIDE 10 MG/1
10 TABLET ORAL DAILY
Status: DISCONTINUED | OUTPATIENT
Start: 2019-08-15 | End: 2019-08-15 | Stop reason: HOSPADM

## 2019-08-15 RX ADMIN — OXYCODONE HYDROCHLORIDE 10 MG: 10 TABLET, FILM COATED, EXTENDED RELEASE ORAL at 09:08

## 2019-08-15 RX ADMIN — FERROUS SULFATE TAB EC 325 MG (65 MG FE EQUIVALENT) 325 MG: 325 (65 FE) TABLET DELAYED RESPONSE at 09:08

## 2019-08-15 RX ADMIN — CETIRIZINE HYDROCHLORIDE 10 MG: 10 TABLET, FILM COATED ORAL at 01:08

## 2019-08-15 RX ADMIN — OXYCODONE HYDROCHLORIDE 5 MG: 5 TABLET ORAL at 02:08

## 2019-08-15 RX ADMIN — IBUPROFEN 800 MG: 400 TABLET ORAL at 02:08

## 2019-08-15 RX ADMIN — ACETAMINOPHEN 650 MG: 325 TABLET ORAL at 11:08

## 2019-08-15 RX ADMIN — FLUOXETINE 40 MG: 20 CAPSULE ORAL at 09:08

## 2019-08-15 RX ADMIN — CEFAZOLIN SODIUM 1 G: 1 SOLUTION INTRAVENOUS at 04:08

## 2019-08-15 RX ADMIN — CEFAZOLIN SODIUM 1 G: 1 SOLUTION INTRAVENOUS at 01:08

## 2019-08-15 RX ADMIN — FLUTICASONE PROPIONATE 100 MCG: 50 SPRAY, METERED NASAL at 01:08

## 2019-08-15 RX ADMIN — HYDROCODONE BITARTRATE AND ACETAMINOPHEN 1 TABLET: 10; 325 TABLET ORAL at 04:08

## 2019-08-15 RX ADMIN — IBUPROFEN 800 MG: 400 TABLET ORAL at 09:08

## 2019-08-15 RX ADMIN — ENOXAPARIN SODIUM 40 MG: 100 INJECTION SUBCUTANEOUS at 09:08

## 2019-08-15 RX ADMIN — HYDROCHLOROTHIAZIDE 25 MG: 25 TABLET ORAL at 09:08

## 2019-08-15 NOTE — PT/OT/SLP PROGRESS
Physical Therapy Treatment    Patient Name:  Iris Mueller   MRN:  [id number]    Recommendations:     Discharge Recommendations:  home health PT vs OPPT  Discharge Equipment Recommendations: W/C.  Pt family has brought in a bariatric RW, and the BSC won't fit in the sister's bathroom   Barriers to discharge: None    Assessment:     Iris Mueller is a 63 y.o. female admitted with a medical diagnosis of Primary osteoarthritis of right knee.  She presents with the following impairments/functional limitations:  weakness, gait instability, decreased ROM, impaired endurance, impaired balance, decreased lower extremity function, decreased safety awareness, impaired self care skills, pain, orthopedic precautions, impaired functional mobilty.  During PT tx, pt demonstrated sit<>stand from a chair with arm rests with RW and CGA, she was able to ambulate 20ft x 3 trials with RW and CGA, but fatigues quickly and requires a seated rest break in between gait trials.  She was able to negotiate 1 step with RW and CGA.  Pt has several family members who are going to be assisting her with her recovery process and were involved with family training today.  Pt has met acute PT goals adequately enough to d/c home with follow up OPPT.      Rehab Prognosis: Fair; patient would benefit from acute skilled PT services to address these deficits and reach maximum level of function.    Recent Surgery: Procedure(s) (LRB):  ARTHROPLASTY, KNEE (Right) 1 Day Post-Op    Plan:     During this hospitalization, patient to be seen BID to address the identified rehab impairments via gait training, therapeutic activities, therapeutic exercises and progress toward the following goals:    · Plan of Care Expires:  08/29/19    Subjective     Chief Complaint: fatigue  Patient/Family Comments/goals: to go to sister's home  Pain/Comfort:  · Pain Rating 1: 10/10  · Location - Side 1: Right  · Location 1: knee  · Pain Addressed 1: Reposition,  "Pre-medicate for activity, Distraction, Nurse notified      Objective:     Communicated with nurse Oates prior to session.  Patient found in the bathroom with sister's, getting off toilet with peripheral IV, cryotherapy upon PT entry to room.     General Precautions: Standard, fall   Orthopedic Precautions:RLE weight bearing as tolerated   Braces: N/A     Functional Mobility:  · Transfers:     · Sit to Stand:  contact guard assistance with rolling walker  · Gait: 20ft x 3 trials with RW, CGA.  Distance is limited due to fatigue.  W/c follow provided due to needing seated rest breaks in between each gait trial.  Sisters present for family training  · Balance: fair  · Stairs:  Pt ascended/descended 4" curb step with Rolling Walker with no handrails with Contact Guard Assistance. Sister present for family training      AM-PAC 6 CLICK MOBILITY  Turning over in bed (including adjusting bedclothes, sheets and blankets)?: 2  Sitting down on and standing up from a chair with arms (e.g., wheelchair, bedside commode, etc.): 2  Moving from lying on back to sitting on the side of the bed?: 2  Moving to and from a bed to a chair (including a wheelchair)?: 2  Need to walk in hospital room?: 1  Climbing 3-5 steps with a railing?: 1  Basic Mobility Total Score: 10       Therapeutic Activities and Exercises:   Pt performed multiple sit<>stand's during tx    Patient left up in chair with all lines intact, call button in reach and family present..    GOALS:   Multidisciplinary Problems     Physical Therapy Goals        Problem: Physical Therapy Goal    Goal Priority Disciplines Outcome Goal Variances Interventions   Physical Therapy Goal     PT, PT/OT Ongoing (interventions implemented as appropriate)     Description:  Goals to be met by: 2019     Patient will increase functional independence with mobility by performin. Supine to sit with Stand-by Assistance  2. Sit to supine with Stand-by Assistance  3. Sit to stand " transfer with Contact Guard Assistance  4. Bed to chair transfer with Contact Guard Assistance using Rolling Walker  5. Gait  x 250 feet with Contact Guard Assistance using Rolling Walker.   6. Ascend/Descend 4 inch curb step with Contact Guard Assistance using Rolling Walker.  7. Lower extremity exercise program x10-20 reps per handout, with independence                      Time Tracking:     PT Received On: 08/15/19  PT Start Time: 1335     PT Stop Time: 1419  PT Total Time (min): 44 min     Billable Minutes: Gait Training 30 and Therapeutic Activity 14    Treatment Type: (PT eval and treat)  PT/PTA: PT           Jazmyne Coats, PT  08/15/2019

## 2019-08-15 NOTE — HPI
This is a 63 y.o. female who complains of moderate chronic bilateral knee pain for years. Denies current injury. Old injury right knee. Pain 9/10 and worse with use. Injections helped a little for a short time. NSAIDS and home exercise program have failed to improve symptoms.  TKA done day of admission.

## 2019-08-15 NOTE — PLAN OF CARE
Problem: Physical Therapy Goal  Goal: Physical Therapy Goal  Goals to be met by: 2019     Patient will increase functional independence with mobility by performin. Supine to sit with Stand-by Assistance  2. Sit to supine with Stand-by Assistance  3. Sit to stand transfer with Contact Guard Assistance  4. Bed to chair transfer with Contact Guard Assistance using Rolling Walker  5. Gait  x 250 feet with Contact Guard Assistance using Rolling Walker.   6. Ascend/Descend 4 inch curb step with Contact Guard Assistance using Rolling Walker.  7. Lower extremity exercise program x10-20 reps per handout, with independence    Outcome: Ongoing (interventions implemented as appropriate)  Pt required Mod A for transfer bed<>chair with RW.

## 2019-08-15 NOTE — PT/OT/SLP EVAL
Physical Therapy Evaluation    Patient Name:  Iris Mueller   MRN:  [id number]    Recommendations:     Discharge Recommendations:  home health PT   Discharge Equipment Recommendations: none   Barriers to discharge: None    Assessment:     Iris Mueller is a 63 y.o. female admitted with a medical diagnosis of Primary osteoarthritis of right knee.  She presents with the following impairments/functional limitations:  weakness, gait instability, decreased ROM, impaired endurance, impaired balance, decreased lower extremity function, decreased safety awareness, impaired self care skills, pain, orthopedic precautions, impaired functional mobilty.  During PT evaluation, pt needed encouragement to perform exercises.  She required Mod A for supine<>sit on the EOB - pt states she normally sleeps in a recliner.  Pt then required Mod A for sit<>Stand and Max A for transfer bed<>chair with pt trying to push weight through her forearms instead of hands.      Rehab Prognosis: Fair; patient would benefit from acute skilled PT services to address these deficits and reach maximum level of function.    Recent Surgery: Procedure(s) (LRB):  ARTHROPLASTY, KNEE (Right) 1 Day Post-Op    Plan:     During this hospitalization, patient to be seen BID to address the identified rehab impairments via gait training, therapeutic activities, therapeutic exercises and progress toward the following goals:    · Plan of Care Expires:  08/29/19    Subjective     Chief Complaint: pain  Patient/Family Comments/goals: pt states she is going to her sister's house.   Pain/Comfort:  · Pain Rating 1: 10/10  · Location - Side 1: Right  · Location 1: knee  · Pain Addressed 1: Reposition, Pre-medicate for activity, Distraction, Nurse notified    Patients cultural, spiritual, Cheondoism conflicts given the current situation: no    Living Environment:  Pt lives in a 1 level home with 1 step to enter.   Prior to admission, patients level of  function was modified independent with use of a cane due to a fall a few weeks ago.  Equipment used at home: cane, straight, walker, rolling, bedside commode.  DME owned (not currently used): none.  Upon discharge, patient will have assistance from sister.    Objective:     Communicated with nurse You and Shanell prior to session.  Patient found HOB elevated with peripheral IV, cryotherapy  upon PT entry to room.    General Precautions: Standard, fall   Orthopedic Precautions:RLE weight bearing as tolerated   Braces: N/A     Exams:  · Cognitive Exam:  Patient is oriented to Person, Place, Time and Situation  · Postural Exam:  Patient presented with the following abnormalities:    · -       Rounded shoulders  · -       Forward head  · -       Abnormal trunk flexion  · RLE ROM: WFL except knee extension -10* to flexion of 70*  · RLE Strength: Deficits: ankle 3/5, knee 2/5, hip 2/5  · LLE ROM: WFL  · LLE Strength: WFL    Functional Mobility:  · Bed Mobility:     · Supine to Sit: moderate assistance  · Transfers:     · Sit to Stand:  moderate assistance with rolling walker  · Bed to Chair: maximal assistance with  rolling walker  using  Step Transfer  · Balance: fair      Therapeutic Activities and Exercises:   Supine AP's, QS, GS, R heel slide all x 10 reps.  Only able to complete 5 reps of R SAQ, and unable to tolerate SLR.     AM-PAC 6 CLICK MOBILITY  Total Score:10     Patient left up in chair with all lines intact, call button in reach and nurse Avelino notified.    GOALS:   Multidisciplinary Problems     Physical Therapy Goals        Problem: Physical Therapy Goal    Goal Priority Disciplines Outcome Goal Variances Interventions   Physical Therapy Goal     PT, PT/OT Ongoing (interventions implemented as appropriate)     Description:  Goals to be met by: 2019     Patient will increase functional independence with mobility by performin. Supine to sit with Stand-by Assistance  2. Sit to supine with Stand-by  Assistance  3. Sit to stand transfer with Contact Guard Assistance  4. Bed to chair transfer with Contact Guard Assistance using Rolling Walker  5. Gait  x 250 feet with Contact Guard Assistance using Rolling Walker.   6. Ascend/Descend 4 inch curb step with Contact Guard Assistance using Rolling Walker.  7. Lower extremity exercise program x10-20 reps per handout, with independence                      History:     Past Medical History:   Diagnosis Date    Asthma     Encounter for blood transfusion     Hypertension        Past Surgical History:   Procedure Laterality Date     SECTION      TONSILLECTOMY         Time Tracking:     PT Received On: 08/15/19  PT Start Time: 908     PT Stop Time: 951  PT Total Time (min): 43 min     Billable Minutes: Evaluation 15, Therapeutic Activity 15 and Therapeutic Exercise 13      Jazmyne Coats, PT  08/15/2019

## 2019-08-15 NOTE — DISCHARGE SUMMARY
[facility] Hospital Medicine  Discharge Summary      Patient Name: Iris Mueller  MRN: [id number]  Admission Date: 8/14/2019  Hospital Length of Stay: 0 days  Discharge Date and Time: No discharge date for patient encounter.  Attending Physician: Junie Batista MD   Discharging Provider: Junie Batista MD  Primary Care Provider: Elkin You MD      HPI:   This is a 63 y.o. female who complains of moderate chronic bilateral knee pain for years. Denies current injury. Old injury right knee. Pain 9/10 and worse with use. Injections helped a little for a short time. NSAIDS and home exercise program have failed to improve symptoms.  TKA done day of admission.    Procedure(s) (LRB):  ARTHROPLASTY, KNEE (Right)      Hospital Course:   Admitted for TKA right/ monitored and routine post op care. bp and asthma and allergy meds continued. NO Complications; ambulated with PT, equipment ordered, outpt PT arranged and pain controlled, tolerating po and cardioresp stable so dc home.   Alert, Nad. Lungs few scattered rhonchi; cor RRR abd-soft. Ext -dressing intact clean and dry          Consults:     No new Assessment & Plan notes have been filed under this hospital service since the last note was generated.  Service: Hospital Medicine    Final Active Diagnoses:    Diagnosis Date Noted POA    PRINCIPAL PROBLEM:  Primary osteoarthritis of right knee [M17.11] 08/14/2019 Yes    Hyperkalemia [E87.5] 08/15/2019 No    Acute renal insufficiency [N28.9] 08/15/2019 No    Non-seasonal allergic rhinitis [J30.89] 08/15/2019 Yes    Mild asthma without complication [J45.909] 08/14/2019 Yes    Essential hypertension [I10] 08/14/2019 Yes    Morbid obesity [E66.01] 08/14/2019 Yes    Fibromyalgia [M79.7] 08/14/2019 Yes      Problems Resolved During this Admission:       Discharged Condition: good    Disposition: Home-Health Care [facility]    Follow Up:  Follow-up Information     Elkin You MD In 1 week.     Specialty:  Family Medicine  Contact information:  [address]  [address]  [facility]  [place] [address]  [phone]             Delio Chung MD In 2 weeks.    Specialties:  Sports Medicine, Orthopedic Surgery  Contact information:  [address] [place] [address] [address]  [address]  [place] [address]  [phone]                 Patient Instructions:      Diet Cardiac       Significant Diagnostic Studies:   Results for SUHAIL HODGES (MRN [id number]) as of 8/15/2019 16:33   Ref. Range 8/1/2019 10:52 8/15/2019 04:25   WBC Latest Ref Range: 3.90 - 12.70 K/uL 9.13 19.64 (H)   RBC Latest Ref Range: 4.00 - 5.40 M/uL 4.26 3.61 (L)   Hemoglobin Latest Ref Range: 12.0 - 16.0 g/dL 12.9 11.1 (L)   Hematocrit Latest Ref Range: 37.0 - 48.5 % 39.8 34.6 (L)   MCV Latest Ref Range: 82 - 98 fL 93 96   MCH Latest Ref Range: 27.0 - 31.0 pg 30.3 30.7   MCHC Latest Ref Range: 32.0 - 36.0 g/dL 32.4 32.1   RDW Latest Ref Range: 11.5 - 14.5 % 13.8 13.6   Platelets Latest Ref Range: 150 - 350 K/uL 400 (H) 433 (H)   MPV Latest Ref Range: 9.2 - 12.9 fL 8.6 (L) 8.8 (L)   Results for SUHAIL HODGES (MRN [id number]) as of 8/15/2019 16:33   Ref. Range 8/15/2019 04:24   Sodium Latest Ref Range: 136 - 145 mmol/L 132 (L)   Potassium Latest Ref Range: 3.5 - 5.1 mmol/L 5.5 (H)   Chloride Latest Ref Range: 95 - 110 mmol/L 99   CO2 Latest Ref Range: 23 - 29 mmol/L 25   Anion Gap Latest Ref Range: 8 - 16 mmol/L 8   BUN, Bld Latest Ref Range: 8 - 23 mg/dL 21   Creatinine Latest Ref Range: 0.5 - 1.4 mg/dL 1.4   eGFR if non African American Latest Ref Range: >60 mL/min/1.73 m^2 39 (A)   eGFR if African American Latest Ref Range: >60 mL/min/1.73 m^2 45 (A)   Glucose Latest Ref Range: 70 - 110 mg/dL 119 (H)   Calcium Latest Ref Range: 8.7 - 10.5 mg/dL 9.0   Phosphorus Latest Ref Range: 2.7 - 4.5 mg/dL 4.6 (H)   Magnesium Latest Ref Range: 1.6 - 2.6 mg/dL 2.0       Pending Diagnostic Studies:     Procedure Component Value Units  Date/Time    Basic metabolic panel [[id number]]     Order Status:  Sent Lab Status:  No result     Specimen:  Blood          Medications:  Reconciled Home Medications:      Medication List      START taking these medications    apixaban 2.5 mg Tab  Commonly known as:  ELIQUIS  Take 1 tablet (2.5 mg total) by mouth 2 (two) times daily.     ibuprofen 800 MG tablet  Commonly known as:  ADVIL,MOTRIN  Take 1 tablet (800 mg total) by mouth every 6 (six) hours as needed for Pain.     oxyCODONE-acetaminophen  mg per tablet  Commonly known as:  PERCOCET  Take 1 tablet by mouth every 6 (six) hours as needed for Pain.        CONTINUE taking these medications    ANORO ELLIPTA 62.5-25 mcg/actuation Dsdv  Generic drug:  umeclidinium-vilanterol     ferrous sulfate 325 mg (65 mg iron) Tab tablet  Commonly known as:  FEOSOL  Take 325 mg by mouth daily with breakfast.     FLUoxetine 40 MG capsule     fluticasone propionate 50 mcg/actuation nasal spray  Commonly known as:  FLONASE     lisinopril-hydrochlorothiazide 20-25 mg Tab  Commonly known as:  PRINZIDE,ZESTORETIC  Take 1 tablet by mouth once daily.     lovastatin 20 MG tablet  Commonly known as:  MEVACOR  Take 20 mg by mouth every evening.     VENTOLIN HFA 90 mcg/actuation inhaler  Generic drug:  albuterol     VITAMIN B-12 50 mcg tablet  Generic drug:  cyanocobalamin (vitamin B-12)  Take 50 mcg by mouth once daily.            Indwelling Lines/Drains at time of discharge:   Lines/Drains/Airways          None          Time spent on the discharge of patient: 32 minutes  Patient was seen and examined on the date of discharge and determined to be suitable for discharge.         Junie Batista MD  Department of Hospital Medicine  [facility]

## 2019-08-15 NOTE — PT/OT/SLP EVAL
Occupational Therapy   Evaluation    Name: Iris Mueller  MRN: [id number]  Admitting Diagnosis:  Primary osteoarthritis of right knee 1 Day Post-Op    Recommendations:     Discharge Recommendations: home health PT, home health OT  Discharge Equipment Recommendations:  (Hip kit, bariatric or extra wide bedside commode; She has access to a bariatric walker from a family member.)  Barriers to discharge:    Needs appropriate size commode    Assessment:     Iris Mueller is a 63 y.o. female with a medical diagnosis of Primary osteoarthritis of right knee.  She presents with a decline in functional status due to the listed impairments, impacting ADLs and functional mobility.  Patient noted to have very wide hips and buttocks and was unable to use the standard bedside commode. She also had difficulty keeping both feet inside a standard width rolling walker due to her wide stance. She displays impaired safety with transfers and standing toileting tasks, needing moderate assistance to complete tasks with cues for safety, sequencing and foot placement. Patient fatigued quickly and was only able to take a few steps. Recommend OT treatment to maximize endurance, safety & independence with ADL's & functional mobility.   Performance deficits affecting function: weakness, impaired self care skills, impaired endurance, impaired functional mobilty, decreased lower extremity function, pain, gait instability, decreased safety awareness, impaired balance, decreased ROM, orthopedic precautions.    Pt will benefit from HH OT & PT to increase ADL independence and safety in pt's home environment.    Rehab Prognosis: Fair; patient would benefit from acute skilled OT services to address these deficits and reach maximum level of function.       Plan:     Patient to be seen 3 x/week to address the above listed problems via self-care/home management, therapeutic activities, therapeutic exercises  · Plan of Care Expires:  09/05/19  · Plan of Care Reviewed with: patient, family    Subjective     Chief Complaint: R knee pain  Patient/Family Comments/goals: How am I going to use the equipment they gave me? It is too small for me.    Occupational Profile:  Living Environment: Pt will be staying with her sister in a [facility] with a walk-in shower and a sunken den with one step up/down.  Previous level of function: (I)/Mod I with I/ADL's using a cane as needed.  Equipment Used at Home:  walker, rolling, cane, straight, bedside commode(New walker and bedside commode are standard sized and are too narrow for patient, who has very wide hips.)  Assistance upon Discharge: Family will assist pt at home.    Pain/Comfort:  · Pain Rating 1: 7/10  · Location - Side 1: Right  · Location 1: knee  · Pain Addressed 1: Pre-medicate for activity, Cessation of Activity, Reposition  · Pain Rating Post-Intervention 1: 6/10    Patients cultural, spiritual, Restoration conflicts given the current situation:      Objective:     Communicated with: nurse Avelino prior to session.  Patient found up in chair with cryotherapy, peripheral IV upon OT entry to room.    General Precautions: Standard, fall   Orthopedic Precautions:RLE weight bearing as tolerated   Braces: N/A     Occupational Performance:    Functional Mobility/Transfers:  · Patient completed Sit <> Stand Transfer with moderate assistance  with  rolling walker and cues for technique from bedside chair   · Patient completed Toilet Transfer Stand Pivot technique with moderate assistance and of 2 persons with  rolling walker and drop arm commode chair to commode and moderate assistance of 1 person from commode to chair. Cues needed for sequencing steps with walker management. Drop arm commode used with one armrest dropped down to allow patient to fit 2/2 hip width.    Activities of Daily Living:  · Upper Body Dressing: supervision and set-up to don her own gown seated in chair    · Lower Body Dressing: minimum  assistance and cues to don/doff socks, pull pants over feet seated in chair using reacher and sock aid  · Toileting: moderate assistance and of 1-2 persons standing at drop arm commode using walker for support and cues for safety and adapted technique to perform hygiene and pull underpants over hips    Cognitive/Visual Perceptual:  Cognitive/Psychosocial Skills:     -       Safety awareness/insight to disability: impaired   -       Mood/Affect/Coping skills/emotional control: Appropriate to situation and Cooperative    Physical Exam:  Postural examination/scapula alignment:    -       Rounded shoulders  -       Forward head  Skin integrity: Visible skin intact  Dominant hand:    -       right  Upper Extremity Range of Motion:     -       Right Upper Extremity: WFL  -       Left Upper Extremity: WFL  Upper Extremity Strength:    -       Right Upper Extremity: WFL  -       Left Upper Extremity: WFL   Strength:    -       Right Upper Extremity: WFL  -       Left Upper Extremity: WFL    AMPAC 6 Click ADL:  AMPAC Total Score: 16    Treatment & Education:  OT ed pt on OT role & POC as well as discharge recommendations including toilet aid for posterior hygiene.  OT educated patient on drop-arm bedside commode transfer techniques using rolling walker.  OT ed pt on use of adaptive equipment for LB dressing, bathing, toileting & safe item retrieval with reacher with demonstration provided.  OT provided family education to pt's sister and niece on safe use of walker, bedside commode, shower transfers at home.  Family verbalized understanding. Nurse navigator notified regarding need for a wider bedside commode and a hip kit which pt requuested.  OT ed pt on fall risk and strongly advised pt to call for help for all OOB mobility.  Education:    Patient left up in chair with all lines intact, call button in reach, nurse Avelino notified and family present    GOALS:   Multidisciplinary Problems     Occupational Therapy Goals         Problem: Occupational Therapy Goal    Goal Priority Disciplines Outcome Interventions   Occupational Therapy Goal     OT, PT/OT Ongoing (interventions implemented as appropriate)    Description:  Goals to be met by: 19     Patient will increase functional independence with ADLs by performing:    LE Dressing with Set-up Assistance, Minimal Assistance and Assistive Devices as needed.  Toileting from bedside commode with Set-up Assistance, Minimal Assistance and Assistive Devices as needed for hygiene and clothing management.   Supine to sit with Minimal Assistance.  Toilet transfer to bedside commode with Minimal Assistance.                      History:     Past Medical History:   Diagnosis Date    Asthma     Encounter for blood transfusion     Hypertension        Past Surgical History:   Procedure Laterality Date     SECTION      TONSILLECTOMY         Time Tracking:     OT Date of Treatment: 08/15/19  OT Start Time: 1016  OT Stop Time: 1103  OT Total Time (min): 47 min    Billable Minutes:Evaluation 8  Self Care/Home Management 25  Therapeutic Activity 14    BIANCA Celis  8/15/2019

## 2019-08-15 NOTE — PROGRESS NOTES
Post-op    Pt AAOx3. Pain controlled. Ambulating with PT.    Right leg - NVI. Dressing CDI.    Plan - Pt feels well and wants to go home. FU 2 weeks. Given HEP.

## 2019-08-15 NOTE — H&P
[facility] Hospital Medicine  History & Physical    Patient Name: Iris Mueller  MRN: [id number]  Admission Date: 2019  Attending Physician: Junie Batista MD   Primary Care Provider: Elkin You MD         Patient information was obtained from patient, past medical records and ER records.     Subjective:     Principal Problem:Primary osteoarthritis of right knee    Chief Complaint: No chief complaint on file.       HPI: This is a 63 y.o. female who complains of moderate chronic bilateral knee pain for years. Denies current injury. Old injury right knee. Pain 9/10 and worse with use. Injections helped a little for a short time. NSAIDS and home exercise program have failed to improve symptoms.  TKA done day of admission     Past Medical History:   Diagnosis Date    Asthma     Encounter for blood transfusion     Hypertension        Past Surgical History:   Procedure Laterality Date     SECTION      TONSILLECTOMY         Review of patient's allergies indicates:  No Known Allergies    No current facility-administered medications on file prior to encounter.      Current Outpatient Medications on File Prior to Encounter   Medication Sig    cyanocobalamin, vitamin B-12, (VITAMIN B-12) 50 mcg tablet Take 50 mcg by mouth once daily.    ferrous sulfate (FEOSOL) 325 mg (65 mg iron) Tab tablet Take 325 mg by mouth daily with breakfast.    FLUoxetine 40 MG capsule     fluticasone (FLONASE) 50 mcg/actuation nasal spray     lisinopril-hydrochlorothiazide (PRINZIDE,ZESTORETIC) 20-25 mg Tab Take 1 tablet by mouth once daily.    lovastatin (MEVACOR) 20 MG tablet Take 20 mg by mouth every evening.    VENTOLIN HFA 90 mcg/actuation inhaler     ANORO ELLIPTA 62.5-25 mcg/actuation DsDv      Family History     None        Tobacco Use    Smoking status: Current Every Day Smoker     Packs/day: 0.50     Years: 30.00     Pack years: 15.00     Types: Cigarettes    Smokeless  tobacco: Never Used   Substance and Sexual Activity    Alcohol use: Not on file     Comment: RARELY    Drug use: Never    Sexual activity: Not on file     Review of Systems   Constitutional: Negative for appetite change, fever and unexpected weight change.   HENT: Negative for congestion and dental problem.    Eyes: Negative for discharge and itching.   Respiratory: Negative for cough and chest tightness.    Cardiovascular: Negative for chest pain and leg swelling.   Gastrointestinal: Negative for abdominal distention and abdominal pain.   Endocrine: Negative for cold intolerance and heat intolerance.   Genitourinary: Negative for difficulty urinating and dysuria.   Musculoskeletal: Negative for arthralgias and back pain.   Skin: Negative for color change and pallor.   Allergic/Immunologic: Negative for environmental allergies and food allergies.   Neurological: Negative for light-headedness and headaches.   Hematological: Negative for adenopathy. Does not bruise/bleed easily.   Psychiatric/Behavioral: Negative for agitation and behavioral problems.     Objective:     Vital Signs (Most Recent):  Temp: 98 °F (36.7 °C) (08/14/19 1625)  Pulse: 75 (08/14/19 1625)  Resp: 16 (08/14/19 1625)  BP: 132/74 (08/14/19 1625)  SpO2: 98 % (08/14/19 1625) Vital Signs (24h Range):  Temp:  [97.9 °F (36.6 °C)-98 °F (36.7 °C)] 98 °F (36.7 °C)  Pulse:  [60-76] 75  Resp:  [13-19] 16  SpO2:  [95 %-99 %] 98 %  BP: (108-205)/(55-86) 132/74     Weight: 120.2 kg (265 lb)  Body mass index is 48.47 kg/m².    Physical Exam   Constitutional: She is oriented to person, place, and time. She appears well-developed and well-nourished. Distressed: 2/2 knee pain    Obese    HENT:   Head: Normocephalic and atraumatic.   Right Ear: External ear normal.   Left Ear: External ear normal.   Mouth/Throat: No oropharyngeal exudate.   Eyes: Conjunctivae and EOM are normal. Right eye exhibits no discharge. Left eye exhibits no discharge. No scleral icterus.    Neck: Normal range of motion. Neck supple. No thyromegaly present.   Cardiovascular: Normal rate, regular rhythm, normal heart sounds and intact distal pulses. Exam reveals no gallop and no friction rub.   No murmur heard.  Pulmonary/Chest: Effort normal and breath sounds normal. No respiratory distress. She has no wheezes.   Abdominal: Soft. Bowel sounds are normal. She exhibits no distension and no mass. There is no tenderness. There is no rebound and no guarding.   Musculoskeletal: Normal range of motion. She exhibits no edema or tenderness.   Right knee dressing/ice pack intact   Distal neurovasc intact    Lymphadenopathy:     She has no cervical adenopathy.   Neurological: She is alert and oriented to person, place, and time. No cranial nerve deficit. Coordination normal.   Skin: Skin is warm and dry. No rash noted. No erythema.   Psychiatric: She has a normal mood and affect. Her behavior is normal. Thought content normal.   Nursing note and vitals reviewed.        CRANIAL NERVES     CN III, IV, VI   Extraocular motions are normal.        Significant Labs: All pertinent labs within the past 24 hours have been reviewed.    Significant Imaging: I have reviewed and interpreted all pertinent imaging results/findings within the past 24 hours.    Assessment/Plan:     * Primary osteoarthritis of right knee  POD 1 TKA-routine post op care per ortho  PT in am       Morbid obesity  Morbid obesity complicates all aspects of disease management from diagnostic modalities to treatment. Weight loss encouraged and health benefits explained to patient.        Essential hypertension  Chronic, stable  Hypertension Medications             lisinopril-hydrochlorothiazide (PRINZIDE,ZESTORETIC) 20-25 mg Tab Take 1 tablet by mouth once daily.      Hospital Medications             hydroCHLOROthiazide tablet 25 mg Starting on 8/15/2019. Take 1 tablet (25 mg total) by mouth once daily.    lisinopril tablet 20 mg Starting on 8/15/2019.  Take 2 tablets (20 mg total) by mouth once daily.            Mild asthma without complication  Chronic, stable, oxgyen, nebs prn. Continue inhaler         VTE Risk Mitigation (From admission, onward)        Ordered     enoxaparin injection 40 mg  Daily      08/14/19 1913     Place sequential compression device  Until discontinued      08/14/19 1003     Place KAROLINA hose  Until discontinued      08/14/19 1003             Junie Batista MD  Department of Hospital Medicine   [facility]

## 2019-08-15 NOTE — ASSESSMENT & PLAN NOTE
Chronic, stable contineu home meds and prn hydralazine as well as adequate pain control   Hypertension Medications             lisinopril-hydrochlorothiazide (PRINZIDE,ZESTORETIC) 20-25 mg Tab Take 1 tablet by mouth once daily.      Hospital Medications             hydroCHLOROthiazide tablet 25 mg Starting on 8/15/2019. Take 1 tablet (25 mg total) by mouth once daily.    lisinopril tablet 20 mg Starting on 8/15/2019. Take 2 tablets (20 mg total) by mouth once daily.

## 2019-08-15 NOTE — ASSESSMENT & PLAN NOTE
Chronic, stable  Hypertension Medications             lisinopril-hydrochlorothiazide (PRINZIDE,ZESTORETIC) 20-25 mg Tab Take 1 tablet by mouth once daily.      Hospital Medications             hydroCHLOROthiazide tablet 25 mg Starting on 8/15/2019. Take 1 tablet (25 mg total) by mouth once daily.    lisinopril tablet 20 mg Starting on 8/15/2019. Take 2 tablets (20 mg total) by mouth once daily.

## 2019-08-15 NOTE — OP NOTE
DATE OF PROCEDURE:  08/14/2019    PREOPERATIVE DIAGNOSIS:  Right knee osteoarthritis.    POSTOPERATIVE DIAGNOSIS:  Right knee osteoarthritis.    PROCEDURE PERFORMED:  Right total knee arthroplasty.    SURGEON:  Delio Chung M.D.    ASSISTANT:  Desi Mc.    ANESTHESIA:  Spinal with a block and local infiltrate.    HISTORY:  Ms. Mueller is a 63-year-old woman who presented to our office with   a complaint of right knee pain.  Her imaging studies and examination were   consistent with osteoarthritis.  We discussed treatment options and she elected   to proceed with a right total knee arthroplasty.  The risks and benefits of   surgical intervention including the potential for incomplete pain relief and the   need for further procedures were explained to the patient who expressed that   she understood and wished to proceed.  Informed consent was obtained.    PROCEDURE IN DETAIL:  After verifying informed consent and correct site, the   patient was brought back to the Operating Room and placed on the OR table in a   supine position.  Preoperative IV antibiotics were administered and a timeout   was performed.  The patient's right lower extremity was then prepped and draped   in sterile fashion.  We began by exsanguinating the leg and inflating the   tourniquet to 300 mmHg.  Following that, we made a midline incision over her   patella extending from just above the superior pole to the tibial tubercle.    Dissection was carried down through skin and subcutaneous tissue and we elevated   medial and lateral skin flaps.  We then performed a medial parapatellar   arthrotomy.  Once we had access to the distal end of the femur, we placed our   Smith and Nephew patient specific instrumentation guide onto her distal femur   and pinned it in place.  We then made our distal femoral cut verifying that our   cuts were in line with the preoperative plan.  Once we had made our distal cut,   we then put the 4-in-1 cutting  guide on and did our chamfer cuts.  We followed   this by putting on a size 5 narrow Smith and Nephew Legion trial femoral   component.  This fit nicely.  We then removed this component.  We then turned   our attention to the tibia and once again used the patient-specific   instrumentation to make our proximal tibial cut.  We verified our cuts against   the preoperative plan again.  Following our tibial cut, we then checked our   flexion and extension gaps and found them to be equal.  We then proceeded to   complete the placement of our tibial tray by pinning it in place.  Once the   tibial tray was in place, we then put our trial femur back on and made our notch   cut.  Following the notch cut, we put in a trial 9 mm polyethylene and reduced   the knee.  She had full range of motion including full extension and full   flexion.  She was stable in varus and valgus stress, both in flexion and   extension.  We then turned our attention to the patella where we made our   patellar cut and then placed a trial 29 mm patellar button.  This tracked well   over the knee with full extension and full flexion.  At this point, we removed   all of our trial components.  We used our local infiltrate on the medial and   lateral sides of the knee for postoperative pain control.  We then copiously   irrigated all of the bone cuts using a Pulsavac.  She had a small area of what   appeared to be a fibrous nonunion from an old fracture on the posterolateral   portion of the tibial plateau.  This was approximately 1 to 2 mm in depth and 5   mm across and this was excised and smoothed out.  We then used standard   cementing technique to insert a Smith and Nephew Legion size 4 tibial tray with   a size 5 narrow femoral component.  Once again, we reduced the knee with a trial   9 mm poly and then applied the 29 mm patellar button.  We then left the knee in   full extension to allow the cement to cure.  While the cement was curing, we   let  the knee soak in a dilute Betadine solution.  Once the cement had cured, we   then checked our poly.  We went up to a size 11 and found that it fit a little   bit better and elected to proceed with an 11 mm polyethylene.  The knee was   copiously irrigated using the Pulsavac and we placed our final polyethylene   component.  She again had full range of motion and good stability in flexion and   extension, both varus and valgus.  At this point, we put the knee in 30 degrees   of flexion and closed our medial parapatellar arthrotomy using 2-0 Stratafix   suture.  Once the arthrotomy was closed, we approximated the skin using 2-0   Vicryl and then closed the skin using a 2-0 Stratafix suture.  A sterile   dressing was applied along with a Prineo dressing.  A PolarCare was applied.    The patient was then awoken from anesthesia, extubated and brought to the PACU   in good condition.    TOTAL TOURNIQUET TIME:  62 minutes.    DRAINS:  None.    SPECIMENS:  None.    COMPLICATIONS:  None.    ESTIMATED BLOOD LOSS:  20 mL.    POSTOPERATIVE PLAN:  She will be admitted for postoperative care.      MARY/IN  dd: 08/14/2019 14:56:14 (CDT)  td: 08/14/2019 19:24:21 (CDT)  Doc ID   #[id number]  Job ID #[id number]    CC:

## 2019-08-15 NOTE — PLAN OF CARE
1210  Contacted [employer] regarding the fact that patient lives in [place], however will be discharging to her sister's home in [facility], and HH will be ordered.  Was informed that they do not contract with any agencies outside of [place], so if they are able to find an accepting HH in [place], it will be considered out of network for the patient.  They are unable to tell me what the OON cost will be for the patient.    1220  Called patient on her cell phone in room and explained above.  Patient stated that she was told prior to surgery that it would be covered in [place].  Patient states that she doesn't want to pay out of network costs.  Informed patient that she could do outpatient PT, however she would need to come to [place] in order for it to be covered; verbalized understanding.  Patient states that she will discuss with her sister when she arrives to the hospital, and call me back.    1530  George, Ortho Navigator, spoke with patient and she would like to do outpatient PT.  George contacted Dr Martinez's nurse, and outpatient PT was ordered.  George is attempting to schedule with [facility].       08/15/19 1219   Discharge Reassessment   Assessment Type Discharge Planning Reassessment

## 2019-08-15 NOTE — PROGRESS NOTES
[facility] Hospital Medicine  Progress Note    Patient Name: Iris Mueller  MRN: [id number]  Patient Class: OP- Outpatient Recovery   Admission Date: 8/14/2019  Length of Stay: 0 days  Attending Physician: Junie Batista MD  Primary Care Provider: Elkin You MD        Subjective:     Principal Problem:Primary osteoarthritis of right knee        HPI:  This is a 63 y.o. female who complains of moderate chronic bilateral knee pain for years. Denies current injury. Old injury right knee. Pain 9/10 and worse with use. Injections helped a little for a short time. NSAIDS and home exercise program have failed to improve symptoms.  TKA done day of admission.    Overview/Hospital Course:  Admitted for TKA right/ monitored and routine post op care. bp and asthma and allergy meds continued.        Interval History:   Pt seen examined  --has some cough and congestion.   No other complaints -pain controlled       Review of Systems   Constitutional: Negative for appetite change, fever and unexpected weight change.   HENT: Negative for congestion and dental problem.    Eyes: Negative for discharge and itching.   Respiratory: Negative for cough and chest tightness.    Cardiovascular: Negative for chest pain and leg swelling.   Gastrointestinal: Negative for abdominal distention and abdominal pain.   Endocrine: Negative for cold intolerance and heat intolerance.   Genitourinary: Negative for difficulty urinating and dysuria.   Musculoskeletal: Negative for arthralgias and back pain.   Skin: Positive for wound (post op knee ).   Allergic/Immunologic: Negative for environmental allergies and food allergies.   Neurological: Negative for light-headedness and headaches.   Hematological: Negative for adenopathy. Does not bruise/bleed easily.   Psychiatric/Behavioral: Negative for agitation and behavioral problems.     Objective:     Vital Signs (Most Recent):  Temp: 97.6 °F (36.4 °C) (08/15/19  1145)  Pulse: 72 (08/15/19 1145)  Resp: 16 (08/15/19 1145)  BP: 135/67 (08/15/19 1145)  SpO2: 97 % (08/15/19 1145) Vital Signs (24h Range):  Temp:  [97.2 °F (36.2 °C)-98 °F (36.7 °C)] 97.6 °F (36.4 °C)  Pulse:  [] 72  Resp:  [13-19] 16  SpO2:  [92 %-100 %] 97 %  BP: (114-205)/(55-86) 135/67     Weight: 120.2 kg (265 lb)  Body mass index is 48.47 kg/m².    Intake/Output Summary (Last 24 hours) at 8/15/2019 1233  Last data filed at 8/15/2019 0600  Gross per 24 hour   Intake 3665 ml   Output 1220 ml   Net 2445 ml      Physical Exam   Constitutional: She is oriented to person, place, and time. She appears well-developed and well-nourished. Distressed: 2/2 knee pain    Obese    HENT:   Head: Normocephalic and atraumatic.   Right Ear: External ear normal.   Left Ear: External ear normal.   Mouth/Throat: No oropharyngeal exudate.   Eyes: Conjunctivae are normal. Right eye exhibits no discharge. Left eye exhibits no discharge. No scleral icterus.   Neck: Normal range of motion. Neck supple. No thyromegaly present.   Cardiovascular: Normal rate, regular rhythm, normal heart sounds and intact distal pulses. Exam reveals no gallop and no friction rub.   No murmur heard.  Pulmonary/Chest: Effort normal and breath sounds normal. No respiratory distress. She has no wheezes.   Abdominal: Soft. Bowel sounds are normal. She exhibits no distension and no mass. There is no tenderness. There is no rebound and no guarding.   Musculoskeletal: Normal range of motion. She exhibits no edema or tenderness.   Right knee dressing/ice pack intact   Distal neurovasc intact    Lymphadenopathy:     She has no cervical adenopathy.   Neurological: She is alert and oriented to person, place, and time. No cranial nerve deficit. Coordination normal.   Skin: Skin is warm and dry. No rash noted. No erythema.   Psychiatric: She has a normal mood and affect. Her behavior is normal. Thought content normal.   Nursing note and vitals  reviewed.      Significant Labs: All pertinent labs within the past 24 hours have been reviewed.    Significant Imaging: I have reviewed and interpreted all pertinent imaging results/findings within the past 24 hours.      Assessment/Plan:      * Primary osteoarthritis of right knee  POD 1 TKA-routine post op care per ortho  PT /OT -  VTE -lovenox       Non-seasonal allergic rhinitis    See asthma     Acute renal insufficiency    Acute --trending labs, monitor I/O   Consider hold ACE -likely decreased volume intake as npo most of yesterday before and after surgery     Hyperkalemia  Acute and uncontrolled,    give neb treatments and monitor -recheck after nebs. Consider kayexalate   Consider hold  Lisinopril         Morbid obesity  Morbid obesity complicates all aspects of disease management from diagnostic modalities to treatment. Weight loss encouraged and health benefits explained to patient.        Essential hypertension  Chronic, stable contineu home meds and prn hydralazine as well as adequate pain control   Hypertension Medications             lisinopril-hydrochlorothiazide (PRINZIDE,ZESTORETIC) 20-25 mg Tab Take 1 tablet by mouth once daily.      Hospital Medications             hydroCHLOROthiazide tablet 25 mg Starting on 8/15/2019. Take 1 tablet (25 mg total) by mouth once daily.    lisinopril tablet 20 mg Starting on 8/15/2019. Take 2 tablets (20 mg total) by mouth once daily.            Mild asthma without complication  Chronic, stable, oxgyen, nebs prn. Continue inhaler   Resume flonase for AR  And zrtec           VTE Risk Mitigation (From admission, onward)        Ordered     enoxaparin injection 40 mg  Every 12 hours      08/14/19 1923     Place sequential compression device  Until discontinued      08/14/19 1003     Place KAROLINA hose  Until discontinued      08/14/19 1003                Junie Batista MD  Department of Hospital Medicine   [facility]

## 2019-08-15 NOTE — CARE UPDATE
08/14/19 2000   Patient Assessment/Suction   Level of Consciousness (AVPU) alert   Respiratory Effort Normal;Unlabored   All Lung Fields Breath Sounds diminished   Rhythm/Pattern, Respiratory depth regular;pattern regular;unlabored   PRE-TX-O2   O2 Device (Oxygen Therapy) nasal cannula   $ Is the patient on Low Flow Oxygen? Yes   Flow (L/min) 2   SpO2 95 %   Pulse Oximetry Type Intermittent   $ Pulse Oximetry - Multiple Charge Pulse Oximetry - Multiple   Inhaler   $ Inhaler Charges MDI (Metered Dose Inahler) Treatment  (Home meds)   Daily Review of Necessity (Inhaler) completed   Respiratory Treatment Status (Inhaler) given   Treatment Route (Inhaler) mouthpiece   Patient Position (Inhaler) semi-Roblero's   Signs of Intolerance (Inhaler) none   Incentive Spirometer   $ Incentive Spirometer Charges done with encouragement   Incentive Spirometer Predicted Level (mL) 2000   Administration (IS) instruction provided, initial   Number of Repetitions (IS) 10   Level Incentive Spirometer (mL) 1500   Patient Tolerance (IS) good

## 2019-08-15 NOTE — PROGRESS NOTES
The enoxaparin dose has been adjusted for Iris Mueller [id number] according to the pharmacy practice protocol.      Based on the patient's CrCl cannot be calculated (Patient's most recent lab result is older than the maximum 7 days allowed.)., Body mass index is 48.47 kg/m²., and weight= 120.2 kg (265 lb), the enoxaparin dose has been adjusted 40 mg every 12 hours.    Thank you,  José Velarde, PharmD

## 2019-08-15 NOTE — CARE UPDATE
08/14/19 1914   Incentive Spirometer   $ Incentive Spirometer Charges other (see comments)  (pt eating, requested to try again later)

## 2019-08-15 NOTE — CARE UPDATE
08/15/19 0648   Patient Assessment/Suction   Level of Consciousness (AVPU) alert   PRE-TX-O2   O2 Device (Oxygen Therapy) nasal cannula  (placed pt on room air)   $ Is the patient on Low Flow Oxygen? Yes   Flow (L/min) 2   SpO2 100 %   Pulse Oximetry Type Intermittent   $ Pulse Oximetry - Multiple Charge Pulse Oximetry - Multiple   Aerosol Therapy   $ Aerosol Therapy Charges PRN treatment not required   Incentive Spirometer   $ Incentive Spirometer Charges done with encouragement   Number of Repetitions (IS) 10   Level Incentive Spirometer (mL) 1500   Patient Tolerance (IS) good

## 2019-08-15 NOTE — PLAN OF CARE
Pt is a pack/day smoker and educated on smoking cessation and has stated she want so quit.  Pt has been signed up for the [facility] smoking cessation program.

## 2019-08-15 NOTE — ASSESSMENT & PLAN NOTE
Acute and uncontrolled,    give neb treatments and monitor -recheck after nebs. Consider kayexalate   Consider hold  Lisinopril

## 2019-08-15 NOTE — ASSESSMENT & PLAN NOTE
Morbid obesity complicates all aspects of disease management from diagnostic modalities to treatment. Weight loss encouraged and health benefits explained to patient.

## 2019-08-15 NOTE — PLAN OF CARE
Patient lives with spouse at address on facesheet.  Patient states she is going to stay with her sister, Guadalupe Camp, ([phone]) in [place] at: [address] [facility] [place] [address].  Denies HH.  Patient is in agreement with HH on discharge, and signed disclosure form (no preference in agency).  Verified insurance as [employer].  Patient has a walker and commode.  Discharge plan is home with HH.       08/15/19 1203   Discharge Assessment   Assessment Type Discharge Planning Assessment   Confirmed/corrected address and phone number on facesheet? Yes   Assessment information obtained from? Patient;Medical Record   Prior to hospitilization cognitive status: Alert/Oriented   Prior to hospitalization functional status: Independent;Assistive Equipment   Current cognitive status: Alert/Oriented   Current Functional Status: Independent;Assistive Equipment   Lives With spouse   Able to Return to Prior Arrangements yes   Is patient able to care for self after discharge? Yes   Patient's perception of discharge disposition home health   Readmission Within the Last 30 Days no previous admission in last 30 days   Patient currently being followed by outpatient case management? No   Patient currently receives any other outside agency services? No   Equipment Currently Used at Home cane, straight;walker, rolling;bedside commode   Do you have any problems affording any of your prescribed medications? No   Is the patient taking medications as prescribed? yes   Does the patient have transportation home? Yes   Transportation Anticipated family or friend will provide   Dialysis Name and Scheduled days none   Does the patient receive services at the [facility]madin Clinic? No   Discharge Plan A Home Health   DME Needed Upon Discharge  none   Patient/Family in Agreement with Plan yes

## 2019-08-15 NOTE — PLAN OF CARE
Problem: Occupational Therapy Goal  Goal: Occupational Therapy Goal  Goals to be met by: 8/21/19     Patient will increase functional independence with ADLs by performing:    LE Dressing with Set-up Assistance, Minimal Assistance and Assistive Devices as needed.  Toileting from bedside commode with Set-up Assistance, Minimal Assistance and Assistive Devices as needed for hygiene and clothing management.   Supine to sit with Minimal Assistance.  Toilet transfer to bedside commode with Minimal Assistance.    Outcome: Ongoing (interventions implemented as appropriate)  OT evaluation completed today. Goals & care plan established.    BIANCA Tovar  8/15/2019

## 2019-08-15 NOTE — ASSESSMENT & PLAN NOTE
Acute --trending labs, monitor I/O   Consider hold ACE -likely decreased volume intake as npo most of yesterday before and after surgery

## 2019-08-15 NOTE — SUBJECTIVE & OBJECTIVE
Interval History:   Pt seen examined  --has some cough and congestion.   No other complaints -pain controlled       Review of Systems   Constitutional: Negative for appetite change, fever and unexpected weight change.   HENT: Negative for congestion and dental problem.    Eyes: Negative for discharge and itching.   Respiratory: Negative for cough and chest tightness.    Cardiovascular: Negative for chest pain and leg swelling.   Gastrointestinal: Negative for abdominal distention and abdominal pain.   Endocrine: Negative for cold intolerance and heat intolerance.   Genitourinary: Negative for difficulty urinating and dysuria.   Musculoskeletal: Negative for arthralgias and back pain.   Skin: Positive for wound (post op knee ).   Allergic/Immunologic: Negative for environmental allergies and food allergies.   Neurological: Negative for light-headedness and headaches.   Hematological: Negative for adenopathy. Does not bruise/bleed easily.   Psychiatric/Behavioral: Negative for agitation and behavioral problems.     Objective:     Vital Signs (Most Recent):  Temp: 97.6 °F (36.4 °C) (08/15/19 1145)  Pulse: 72 (08/15/19 1145)  Resp: 16 (08/15/19 1145)  BP: 135/67 (08/15/19 1145)  SpO2: 97 % (08/15/19 1145) Vital Signs (24h Range):  Temp:  [97.2 °F (36.2 °C)-98 °F (36.7 °C)] 97.6 °F (36.4 °C)  Pulse:  [] 72  Resp:  [13-19] 16  SpO2:  [92 %-100 %] 97 %  BP: (114-205)/(55-86) 135/67     Weight: 120.2 kg (265 lb)  Body mass index is 48.47 kg/m².    Intake/Output Summary (Last 24 hours) at 8/15/2019 1233  Last data filed at 8/15/2019 0600  Gross per 24 hour   Intake 3665 ml   Output 1220 ml   Net 2445 ml      Physical Exam   Constitutional: She is oriented to person, place, and time. She appears well-developed and well-nourished. Distressed: 2/2 knee pain    Obese    HENT:   Head: Normocephalic and atraumatic.   Right Ear: External ear normal.   Left Ear: External ear normal.   Mouth/Throat: No oropharyngeal exudate.    Eyes: Conjunctivae are normal. Right eye exhibits no discharge. Left eye exhibits no discharge. No scleral icterus.   Neck: Normal range of motion. Neck supple. No thyromegaly present.   Cardiovascular: Normal rate, regular rhythm, normal heart sounds and intact distal pulses. Exam reveals no gallop and no friction rub.   No murmur heard.  Pulmonary/Chest: Effort normal and breath sounds normal. No respiratory distress. She has no wheezes.   Abdominal: Soft. Bowel sounds are normal. She exhibits no distension and no mass. There is no tenderness. There is no rebound and no guarding.   Musculoskeletal: Normal range of motion. She exhibits no edema or tenderness.   Right knee dressing/ice pack intact   Distal neurovasc intact    Lymphadenopathy:     She has no cervical adenopathy.   Neurological: She is alert and oriented to person, place, and time. No cranial nerve deficit. Coordination normal.   Skin: Skin is warm and dry. No rash noted. No erythema.   Psychiatric: She has a normal mood and affect. Her behavior is normal. Thought content normal.   Nursing note and vitals reviewed.      Significant Labs: All pertinent labs within the past 24 hours have been reviewed.    Significant Imaging: I have reviewed and interpreted all pertinent imaging results/findings within the past 24 hours.

## 2019-08-15 NOTE — NURSING
Discharge instructions provided to patient and family. PIV removed with no complications. Dressing changed per MD order; incision appears clean, dry, and intact. Bandage applied to incision. Cryo machine bagged and sent home with patient. All follow up appointments and medications discussed. Personal belongings packed up by family and taken off the floor with patient. Patient transported to vehicle via wheelchair.

## 2019-08-15 NOTE — PLAN OF CARE
Problem: Adult Inpatient Plan of Care  Goal: Plan of Care Review  Plan of care reviewed with pt, pt verbalized understanding. IV site clean, dry, intact, no redness or swelling noted. Arrieta catheter in place draining clear, yellow urine. Dressing to right knee clean, dry, and intact, cryotherapy in place. Remains free of fall/trauma. Pt reports pain this shift, given PRN pain medication per order. Neuro checks done, purposeful hourly rounding performed, safety maintained throughout shift, call light in reach, bed locked and in lowest position, side rails up x2. Will continue to monitor, observe and report any changes.

## 2019-08-15 NOTE — SUBJECTIVE & OBJECTIVE
Past Medical History:   Diagnosis Date    Asthma     Encounter for blood transfusion     Hypertension        Past Surgical History:   Procedure Laterality Date     SECTION      TONSILLECTOMY         Review of patient's allergies indicates:  No Known Allergies    No current facility-administered medications on file prior to encounter.      Current Outpatient Medications on File Prior to Encounter   Medication Sig    cyanocobalamin, vitamin B-12, (VITAMIN B-12) 50 mcg tablet Take 50 mcg by mouth once daily.    ferrous sulfate (FEOSOL) 325 mg (65 mg iron) Tab tablet Take 325 mg by mouth daily with breakfast.    FLUoxetine 40 MG capsule     fluticasone (FLONASE) 50 mcg/actuation nasal spray     lisinopril-hydrochlorothiazide (PRINZIDE,ZESTORETIC) 20-25 mg Tab Take 1 tablet by mouth once daily.    lovastatin (MEVACOR) 20 MG tablet Take 20 mg by mouth every evening.    VENTOLIN HFA 90 mcg/actuation inhaler     ANORO ELLIPTA 62.5-25 mcg/actuation DsDv      Family History     None        Tobacco Use    Smoking status: Current Every Day Smoker     Packs/day: 0.50     Years: 30.00     Pack years: 15.00     Types: Cigarettes    Smokeless tobacco: Never Used   Substance and Sexual Activity    Alcohol use: Not on file     Comment: RARELY    Drug use: Never    Sexual activity: Not on file     Review of Systems   Constitutional: Negative for appetite change, fever and unexpected weight change.   HENT: Negative for congestion and dental problem.    Eyes: Negative for discharge and itching.   Respiratory: Negative for cough and chest tightness.    Cardiovascular: Negative for chest pain and leg swelling.   Gastrointestinal: Negative for abdominal distention and abdominal pain.   Endocrine: Negative for cold intolerance and heat intolerance.   Genitourinary: Negative for difficulty urinating and dysuria.   Musculoskeletal: Negative for arthralgias and back pain.   Skin: Negative for color change and pallor.    Allergic/Immunologic: Negative for environmental allergies and food allergies.   Neurological: Negative for light-headedness and headaches.   Hematological: Negative for adenopathy. Does not bruise/bleed easily.   Psychiatric/Behavioral: Negative for agitation and behavioral problems.     Objective:     Vital Signs (Most Recent):  Temp: 98 °F (36.7 °C) (08/14/19 1625)  Pulse: 75 (08/14/19 1625)  Resp: 16 (08/14/19 1625)  BP: 132/74 (08/14/19 1625)  SpO2: 98 % (08/14/19 1625) Vital Signs (24h Range):  Temp:  [97.9 °F (36.6 °C)-98 °F (36.7 °C)] 98 °F (36.7 °C)  Pulse:  [60-76] 75  Resp:  [13-19] 16  SpO2:  [95 %-99 %] 98 %  BP: (108-205)/(55-86) 132/74     Weight: 120.2 kg (265 lb)  Body mass index is 48.47 kg/m².    Physical Exam   Constitutional: She is oriented to person, place, and time. She appears well-developed and well-nourished. Distressed: 2/2 knee pain    Obese    HENT:   Head: Normocephalic and atraumatic.   Right Ear: External ear normal.   Left Ear: External ear normal.   Mouth/Throat: No oropharyngeal exudate.   Eyes: Conjunctivae and EOM are normal. Right eye exhibits no discharge. Left eye exhibits no discharge. No scleral icterus.   Neck: Normal range of motion. Neck supple. No thyromegaly present.   Cardiovascular: Normal rate, regular rhythm, normal heart sounds and intact distal pulses. Exam reveals no gallop and no friction rub.   No murmur heard.  Pulmonary/Chest: Effort normal and breath sounds normal. No respiratory distress. She has no wheezes.   Abdominal: Soft. Bowel sounds are normal. She exhibits no distension and no mass. There is no tenderness. There is no rebound and no guarding.   Musculoskeletal: Normal range of motion. She exhibits no edema or tenderness.   Right knee dressing/ice pack intact   Distal neurovasc intact    Lymphadenopathy:     She has no cervical adenopathy.   Neurological: She is alert and oriented to person, place, and time. No cranial nerve deficit. Coordination  normal.   Skin: Skin is warm and dry. No rash noted. No erythema.   Psychiatric: She has a normal mood and affect. Her behavior is normal. Thought content normal.   Nursing note and vitals reviewed.        CRANIAL NERVES     CN III, IV, VI   Extraocular motions are normal.        Significant Labs: All pertinent labs within the past 24 hours have been reviewed.    Significant Imaging: I have reviewed and interpreted all pertinent imaging results/findings within the past 24 hours.

## 2019-08-15 NOTE — HOSPITAL COURSE
Admitted for TKA right/ monitored and routine post op care. bp and asthma and allergy meds continued. NO Complications; ambulated with PT, equipment ordered, outpt PT arranged and pain controlled, tolerating po and cardioresp stable so dc home.   Alert, Nad. Lungs few scattered rhonchi; cor RRR abd-soft. Ext -dressing intact clean and dry

## 2019-08-16 ENCOUNTER — TELEPHONE (OUTPATIENT)
Dept: MEDSURG UNIT | Facility: HOSPITAL | Age: 64
End: 2019-08-16

## 2019-08-16 ENCOUNTER — TELEPHONE (OUTPATIENT)
Dept: ORTHOPEDICS | Facility: CLINIC | Age: 64
End: 2019-08-16

## 2019-08-16 NOTE — PLAN OF CARE
08/16/19 0809   Final Note   Assessment Type Final Discharge Note   Anticipated Discharge Disposition Home   Hospital Follow Up  Appt(s) scheduled? Yes

## 2019-08-16 NOTE — TELEPHONE ENCOUNTER
Called patient to see how she was doing following her knee surgery on Wednesday. Pt states doing well. Has a lot of family with her to help as needed. No complaints, Advised to call us if any problems arose. Pt verbalized understanding.

## 2019-08-17 ENCOUNTER — NURSE TRIAGE (OUTPATIENT)
Dept: ADMINISTRATIVE | Facility: CLINIC | Age: 64
End: 2019-08-17

## 2019-08-17 NOTE — TELEPHONE ENCOUNTER
Reason for Disposition   [1] Bleeding from incision AND [2] won't stop after 10 minutes of direct pressure    Additional Information   Negative: [1] Major abdominal surgical incision AND [2] wound gaping open AND [3] visible internal organs   Negative: Sounds like a life-threatening emergency to the triager    Protocols used: POST-OP INCISION SYMPTOMS-A-AH    Patient's niece C/O Bloody and Serosanguineous drainage moderate-large in amount. Denies any pain. States that she cannot tell if there is any increase in swelling or bruising due to the bruises that were already in place. Recent procedure: knee replacement on 8/14. Ping states that she Placed a gauze over night, held pressure 45mins, this morning dressing was saturated completely. With activity this morning several new gauzes applied and saturated, feels like the wound could possibly be opening and concerned about the amount of drainage. Advised to go/bring Mrs. Mueller to the ED. Declines, would like to page  first per sandra.      11:22 - paged.      returned call 12:00 (updated with patient's complaints and concerns)  - advises to go to the ED, if doesn't feel like it is severe enough, continue to wrap with bandages, keep area clean (decrease the amount of contact/ touching due to the risk of infection)    12:03 Patient and sandra Feng updated with MD instructions.

## 2019-08-19 ENCOUNTER — TELEPHONE (OUTPATIENT)
Dept: ORTHOPEDICS | Facility: CLINIC | Age: 64
End: 2019-08-19

## 2019-08-19 ENCOUNTER — HOSPITAL ENCOUNTER (OUTPATIENT)
Dept: RADIOLOGY | Facility: HOSPITAL | Age: 64
Discharge: HOME | End: 2019-08-19
Attending: ORTHOPAEDIC SURGERY
Payer: MEDICARE

## 2019-08-19 ENCOUNTER — OFFICE VISIT (OUTPATIENT)
Dept: ORTHOPEDICS | Facility: CLINIC | Age: 64
End: 2019-08-19
Payer: MEDICARE

## 2019-08-19 ENCOUNTER — CLINICAL SUPPORT (OUTPATIENT)
Dept: REHABILITATION | Facility: HOSPITAL | Age: 64
End: 2019-08-19
Attending: ORTHOPAEDIC SURGERY
Payer: MEDICARE

## 2019-08-19 VITALS — BODY MASS INDEX: 48.76 KG/M2 | WEIGHT: 265 LBS | HEIGHT: 62 IN | RESPIRATION RATE: 18 BRPM

## 2019-08-19 DIAGNOSIS — Z96.651 S/P TOTAL KNEE REPLACEMENT, RIGHT: ICD-10-CM

## 2019-08-19 DIAGNOSIS — Z96.651 S/P TOTAL KNEE ARTHROPLASTY, RIGHT: Primary | ICD-10-CM

## 2019-08-19 DIAGNOSIS — M79.661 PAIN AND SWELLING OF RIGHT LOWER LEG: ICD-10-CM

## 2019-08-19 DIAGNOSIS — M17.11 PRIMARY OSTEOARTHRITIS OF RIGHT KNEE: ICD-10-CM

## 2019-08-19 DIAGNOSIS — Z96.651 S/P TOTAL KNEE REPLACEMENT, RIGHT: Primary | ICD-10-CM

## 2019-08-19 DIAGNOSIS — M79.89 PAIN AND SWELLING OF RIGHT LOWER LEG: ICD-10-CM

## 2019-08-19 PROCEDURE — 97162 PT EVAL MOD COMPLEX 30 MIN: CPT | Mod: PN

## 2019-08-19 PROCEDURE — G8979 MOBILITY GOAL STATUS: HCPCS | Mod: CI,PN

## 2019-08-19 PROCEDURE — 99024 PR POST-OP FOLLOW-UP VISIT: ICD-10-PCS | Mod: S$GLB,,, | Performed by: ORTHOPAEDIC SURGERY

## 2019-08-19 PROCEDURE — 97110 THERAPEUTIC EXERCISES: CPT | Mod: PN

## 2019-08-19 PROCEDURE — 93971 EXTREMITY STUDY: CPT | Mod: 26,RT,, | Performed by: RADIOLOGY

## 2019-08-19 PROCEDURE — G8978 MOBILITY CURRENT STATUS: HCPCS | Mod: CM,PN

## 2019-08-19 PROCEDURE — 93971 EXTREMITY STUDY: CPT | Mod: TC,RT

## 2019-08-19 PROCEDURE — 99999 PR PBB SHADOW E&M-EST. PATIENT-LVL III: ICD-10-PCS | Mod: PBBFAC,,, | Performed by: ORTHOPAEDIC SURGERY

## 2019-08-19 PROCEDURE — 99999 PR PBB SHADOW E&M-EST. PATIENT-LVL III: CPT | Mod: PBBFAC,,, | Performed by: ORTHOPAEDIC SURGERY

## 2019-08-19 PROCEDURE — 99024 POSTOP FOLLOW-UP VISIT: CPT | Mod: S$GLB,,, | Performed by: ORTHOPAEDIC SURGERY

## 2019-08-19 PROCEDURE — 93971 US LOWER EXTREMITY VEINS RIGHT: ICD-10-PCS | Mod: 26,RT,, | Performed by: RADIOLOGY

## 2019-08-19 NOTE — TELEPHONE ENCOUNTER
----- Message from Tere Muñoz sent at 8/19/2019 11:49 AM CDT -----  Contact: Shari sister  Type: Needs Medical Advice    Who Called:  Shari  Symptoms (please be specific):  Pt had surgery last week. Her incision is still bleeding. She was told to call the dr's ofc if it was still bleeding on monday  How long has patient had these symptoms:  Last week  Best Call Back Number: [phone]  Additional Information: Pls call Shari to adv

## 2019-08-19 NOTE — TELEPHONE ENCOUNTER
Spoke to patients sister. Still having some bleeding from incisions, states she is changing bandages every 2 hours. Scheduled Appointment today at 2:30pm for evaluation. Verbalized understanding.

## 2019-08-19 NOTE — PLAN OF CARE
[facility]  Physical Therapy Initial Evaluation    Name: Iris Mueller  Clinic Number: [id number]    Therapy Diagnosis: R knee pain and weakness   Physician: Delio Chung MD    Physician Orders: PT Eval and Treat    Medical Diagnosis from Referral: R knee OA   Evaluation Date: 2019  Authorization Period Expiration: 19   Plan of Care Expiration: 10/4/19   Visit # / Visits authorized:     Time In: 1:10 PM   Time Out: 1:55 PM   Total Billable Time: 45  minutes    Precautions: Standard, blood thinners and Fall    Subjective   Date of onset: 19   History of current condition - Iris reports: ongoing R knee pain with difficulty walking and standing s/p R TKA on 19. Pt reports ongoing bleeding in incision, which she will follow up with her MD later today. She is staying with her sister in [place]. She requires +1 assist for all of her gait and functional and mobility.      Medical History:   Past Medical History:   Diagnosis Date    Asthma     Encounter for blood transfusion     Hypertension        Surgical History:   Iris Mueller  has a past surgical history that includes Tonsillectomy;  section; and Knee Arthroplasty (Right, 2019).    Medications:   Iris has a current medication list which includes the following prescription(s): anoro ellipta, apixaban, cyanocobalamin (vitamin b-12), ferrous sulfate, fluoxetine, fluticasone propionate, ibuprofen, lisinopril-hydrochlorothiazide, lovastatin, oxycodone-acetaminophen, and ventolin hfa.    Allergies:   Review of patient's allergies indicates:  No Known Allergies     Imaging, see epic     Prior Therapy: none   Social History:   lives with their family  Occupation: retired   Prior Level of Function: ongoing difficulty with walking and standing due to R knee pain   Current Level of Function: unable to walk or transfer without assist s/p R knee TKA     Pain:  Current 10/10, worst 10/10,  best 8/10   Location: right knee   Description: Aching, Burning and Throbbing  Aggravating Factors: Standing, Bending, Walking and Getting out of bed/chair  Easing Factors: pain medication      Objective     Posture: decreased WB on RLE   Palpation: edema; + bloody discharge from incistion  Sensation: intact     Range of Motion/Strength:     Knee ROM Right  Left  Pain/Dysfunction with Movement   Knee flexion 60 degrees 115 degress    Knee extension 0 degrees 0 degrees      Knee MMT Right  Left    Knee flexion 3-/5 4/5   Knee extension 2/5 4/5       Flexibility Right  Left    Hamstrings 45 degrees 25 degrees   Achilles -5 degrees 10 degrees       Gait With AD.  Device Used -  Pick-up walker   Analysis Decreased WB on RLE       Other:   LEFS: 7/80: 91% impairment        CMS Impairment/Limitation/Restriction for LEFS Survey    Therapist reviewed FOTO scores for Iris Mueller on 8/19/2019.   FOTO documents entered into [employer] - see Media section.    Limitation Score: 91 %  Category: Mobility    Current : CI = at least 1% but < 20% impaired, limited or restricted  Goal: CM = at least 80% but < 100% impaired limited or restricted         TREATMENT   Treatment Time In: 1:30 PM   Treatment Time Out: 1:55 PM   Total Treatment time separate from Evaluation: 25  minutes    Iris received therapeutic exercises to develop strength, endurance, ROM, flexibility, posture and core stabilization for 25  minutes including:  -Quad sets with roll  2x 20   -calf stretch with strap 5 x 10 sec   -SAQ x 20   -Glute sets x 20   -heel slides seated with towel x 20           Home Exercises and Patient Education Provided    Education provided:   - HEP     Written Home Exercises Provided: yes.  Exercises were reviewed and Iris was able to demonstrate them prior to the end of the session.  Iris demonstrated good  understanding of the education provided.     See EMR under Media for exercises provided 8/19/2019.    Assessment    Iris is a 63 y.o. female referred to outpatient Physical Therapy with a medical diagnosis of R knee OA; s/p R TKA 8/14/19 . Pt presents with decreased ROM, strength, functional mobility and gait.    Pt prognosis is Good.   Pt will benefit from skilled outpatient Physical Therapy to address the deficits stated above and in the chart below, provide pt/family education, and to maximize pt's level of independence.     Plan of care discussed with patient: Yes  Pt's spiritual, cultural and educational needs considered and patient is agreeable to the plan of care and goals as stated below:     Anticipated Barriers for therapy: insurance, activity tolerance, co-morbidities    Medical Necessity is demonstrated by the following  History  Co-morbidities and personal factors that may impact the plan of care Co-morbidities:   COPD/asthma, diabetes and high BMI    Personal Factors:   coping style  lifestyle     moderate   Examination  Body Structures and Functions, activity limitations and participation restrictions that may impact the plan of care Body Regions:   lower extremities    Body Systems:    gross symmetry  ROM  strength  gross coordinated movement  balance  gait  transfers  transitions  motor control  motor learning  skin integrity    Participation Restrictions:   Post-op protocol     Activity limitations:   Learning and applying knowledge  no deficits    General Tasks and Commands  no deficits    Communication  no deficits    Mobility  walking    Self care  no deficits    Domestic Life  shopping  cooking  doing house work (cleaning house, washing dishes, laundry)    Interactions/Relationships  no deficits    Life Areas  no deficits    Community and Social Life  community life         moderate   Clinical Presentation evolving clinical presentation with changing clinical characteristics moderate   Decision Making/ Complexity Score: moderate       Goals:   · Non-antalgic, independent gait       Independent step  over step stair climbing       Pain-free AROM R knee WFL        At least 4+/5 muscular performance based on MMT of all LE musculature.       Normal, age appropriate balance and proprioception.       Patient is independent with home exercise program.       Plan   Plan of care Certification: 8/19/2019 to 10/4/19     Outpatient Physical Therapy 2-3 times weekly for 6 weeks to include the following interventions: Electrical Stimulation IFC, Gait Training, Manual Therapy, Moist Heat/ Ice, Neuromuscular Re-ed, Patient Education, Self Care, Therapeutic Activites and Therapeutic Exercise.     Teofilo Cameron, PT

## 2019-08-19 NOTE — PROGRESS NOTES
Patient educated on smoking cessation program  Patient smokes 1 pack a day patient signed up at this time for trust information left with patient about program

## 2019-08-21 ENCOUNTER — CLINICAL SUPPORT (OUTPATIENT)
Dept: REHABILITATION | Facility: HOSPITAL | Age: 64
End: 2019-08-21
Attending: ORTHOPAEDIC SURGERY
Payer: MEDICARE

## 2019-08-21 ENCOUNTER — TELEPHONE (OUTPATIENT)
Dept: ORTHOPEDICS | Facility: CLINIC | Age: 64
End: 2019-08-21

## 2019-08-21 DIAGNOSIS — M17.11 PRIMARY OSTEOARTHRITIS OF RIGHT KNEE: ICD-10-CM

## 2019-08-21 PROCEDURE — 97110 THERAPEUTIC EXERCISES: CPT | Mod: PN

## 2019-08-21 NOTE — PROGRESS NOTES
Physical Therapy Daily Treatment Note     Name: Iris Glasgow [facility] Number: [id number]    Therapy Diagnosis:   Encounter Diagnosis   Name Primary?    Primary osteoarthritis of right knee      Physician: Delio Chung MD    Visit Date: 8/21/2019  Physician Orders: PT Eval and Treat    Medical Diagnosis from Referral: R knee OA   Evaluation Date: 8/19/2019  Authorization Period Expiration: 9/2/19   Plan of Care Expiration: 10/4/19   Visit # / Visits authorized: 2      Time In: 156p  Time Out: 254  Total Billable Time: 58 minutes    Precautions: Standard and Fall    Subjective     Pt reports: she had difficulty getting out of recliner, she and her sister ordered another one and it should be here today, she is staying at her sister's house while she is recovering.  She was compliant with home exercise program.  Response to previous treatment:   Functional change:     Pain: 10/10, reviewed with pt if she is 10/10, she needs to go to ED  Location: right knee      Objective     Iris received therapeutic exercises to develop strength, ROM and flexibility for 58 minutes including:    Seated:  QS x  20  GS x 20  HR/TR x 20  Heel slides x 20, with assistance to increase knee flexion  Gastroc stretch 5 x 15 sec  Hip adduction x 20  Hip abduction GTB x 20  LAQ x 20    Mini squats while standing at SW 2 x 10    Home Exercises Provided and Patient Education Provided     Education provided:   - cont HEP    Written Home Exercises Provided: Patient instructed to cont prior HEP.  Exercises were reviewed and Iris was able to demonstrate them prior to the end of the session.  Iris demonstrated good  understanding of the education provided.     See EMR under Patient Instructions for exercises provided prior visit.    Assessment     Pt arrived to therapy w/SW.  Edema B LE's, R LE shiny.  Pt reported she saw MD, and has no blood clots.  Pt reported increased pain upon completion of therapy.    Iris is  progressing well towards her goals.   Pt prognosis is Good.     Pt will continue to benefit from skilled outpatient physical therapy to address the deficits listed in the problem list box on initial evaluation, provide pt/family education and to maximize pt's level of independence in the home and community environment.     Pt's spiritual, cultural and educational needs considered and pt agreeable to plan of care and goals.    Anticipated barriers to physical therapy: none    Goals:   · Non-antalgic, independent gait       Independent step over step stair climbing       Pain-free AROM R knee WFL        At least 4+/5 muscular performance based on MMT of all LE musculature.       Normal, age appropriate balance and proprioception.       Patient is independent with home exercise program      Plan     Cont per POC    Valerie Denise, PTA

## 2019-08-21 NOTE — TELEPHONE ENCOUNTER
Returned patients call. No answer, LVM advising Dr Chung does not write meds for sleep. Will need to contact her PCP. Advised to return call if needed.

## 2019-08-21 NOTE — TELEPHONE ENCOUNTER
----- Message from Holland Parra sent at 8/21/2019 11:11 AM CDT -----  Contact: pt  Patient wants something to help her sleep., [phone]      [facility] ST[facility] #[id number] - [place], [place] - [address] AT [facility] [address]  [address]  [place] [address]  Phone: [phone] Fax: [phone]

## 2019-08-22 RX ORDER — OXYCODONE AND ACETAMINOPHEN 10; 325 MG/1; MG/1
1 TABLET ORAL EVERY 6 HOURS PRN
Qty: 30 TABLET | Refills: 0 | Status: ON HOLD | OUTPATIENT
Start: 2019-08-22 | End: 2019-09-05 | Stop reason: SDUPTHER

## 2019-08-22 NOTE — TELEPHONE ENCOUNTER
Spoke to patient. Advised medication refill has been sent to the pharmacy. Verbalized understanding.

## 2019-08-23 ENCOUNTER — CLINICAL SUPPORT (OUTPATIENT)
Dept: REHABILITATION | Facility: HOSPITAL | Age: 64
End: 2019-08-23
Attending: ORTHOPAEDIC SURGERY
Payer: MEDICARE

## 2019-08-23 ENCOUNTER — HOSPITAL ENCOUNTER (EMERGENCY)
Facility: HOSPITAL | Age: 64
Discharge: HOME | End: 2019-08-23
Attending: EMERGENCY MEDICINE
Payer: MEDICARE

## 2019-08-23 ENCOUNTER — TELEPHONE (OUTPATIENT)
Dept: ORTHOPEDICS | Facility: CLINIC | Age: 64
End: 2019-08-23

## 2019-08-23 VITALS
SYSTOLIC BLOOD PRESSURE: 122 MMHG | OXYGEN SATURATION: 95 % | WEIGHT: 265 LBS | RESPIRATION RATE: 16 BRPM | DIASTOLIC BLOOD PRESSURE: 78 MMHG | TEMPERATURE: 98 F | HEART RATE: 80 BPM | BODY MASS INDEX: 48.76 KG/M2 | HEIGHT: 62 IN

## 2019-08-23 DIAGNOSIS — M17.11 PRIMARY OSTEOARTHRITIS OF RIGHT KNEE: ICD-10-CM

## 2019-08-23 DIAGNOSIS — M79.89 LEG SWELLING: Primary | ICD-10-CM

## 2019-08-23 LAB
ALBUMIN SERPL BCP-MCNC: 2.8 G/DL (ref 3.5–5.2)
ALP SERPL-CCNC: 131 U/L (ref 55–135)
ALT SERPL W/O P-5'-P-CCNC: 14 U/L (ref 10–44)
ANION GAP SERPL CALC-SCNC: 12 MMOL/L (ref 8–16)
AST SERPL-CCNC: 13 U/L (ref 10–40)
BASOPHILS # BLD AUTO: 0.02 K/UL (ref 0–0.2)
BASOPHILS NFR BLD: 0.2 % (ref 0–1.9)
BILIRUB SERPL-MCNC: 0.7 MG/DL (ref 0.1–1)
BNP SERPL-MCNC: 199 PG/ML (ref 0–99)
BUN SERPL-MCNC: 21 MG/DL (ref 8–23)
CALCIUM SERPL-MCNC: 9.2 MG/DL (ref 8.7–10.5)
CHLORIDE SERPL-SCNC: 93 MMOL/L (ref 95–110)
CO2 SERPL-SCNC: 23 MMOL/L (ref 23–29)
COAGULATION SPECIALIST REVIEW: NORMAL
CREAT SERPL-MCNC: 1.2 MG/DL (ref 0.5–1.4)
DIFFERENTIAL METHOD: ABNORMAL
EOSINOPHIL # BLD AUTO: 0.2 K/UL (ref 0–0.5)
EOSINOPHIL NFR BLD: 1.8 % (ref 0–8)
ERYTHROCYTE [DISTWIDTH] IN BLOOD BY AUTOMATED COUNT: 14.6 % (ref 11.5–14.5)
EST. GFR  (AFRICAN AMERICAN): 55 ML/MIN/1.73 M^2
EST. GFR  (NON AFRICAN AMERICAN): 48 ML/MIN/1.73 M^2
FACT VIII ACT/NOR PPP: 167 % (ref 55–200)
GLUCOSE SERPL-MCNC: 103 MG/DL (ref 70–110)
HCT VFR BLD AUTO: 27.8 % (ref 37–48.5)
HGB BLD-MCNC: 9.1 G/DL (ref 12–16)
IMM GRANULOCYTES # BLD AUTO: 0.44 K/UL (ref 0–0.04)
LYMPHOCYTES # BLD AUTO: 1.1 K/UL (ref 1–4.8)
LYMPHOCYTES NFR BLD: 9.4 % (ref 18–48)
MCH RBC QN AUTO: 31.4 PG (ref 27–31)
MCHC RBC AUTO-ENTMCNC: 32.7 G/DL (ref 32–36)
MCV RBC AUTO: 96 FL (ref 82–98)
MONOCYTES # BLD AUTO: 0.6 K/UL (ref 0.3–1)
MONOCYTES NFR BLD: 5 % (ref 4–15)
NEUTROPHILS # BLD AUTO: 9.5 K/UL (ref 1.8–7.7)
NEUTROPHILS NFR BLD: 79.9 % (ref 38–73)
NRBC BLD-RTO: 0 /100 WBC
PLATELET # BLD AUTO: 428 K/UL (ref 150–350)
PMV BLD AUTO: 8.3 FL (ref 9.2–12.9)
POTASSIUM SERPL-SCNC: 3.7 MMOL/L (ref 3.5–5.1)
PROT SERPL-MCNC: 6.2 G/DL (ref 6–8.4)
PROVIDER SIGNING NAME: NORMAL
RBC # BLD AUTO: 2.9 M/UL (ref 4–5.4)
SODIUM SERPL-SCNC: 128 MMOL/L (ref 136–145)
VWF AG ACT/NOR PPP IA: 200 % (ref 55–200)
VWF MULTIMERS PPP QL: NORMAL
VWF:AC ACT/NOR PPP IA: 141 % (ref 55–200)
WBC # BLD AUTO: 11.87 K/UL (ref 3.9–12.7)

## 2019-08-23 PROCEDURE — 99284 EMERGENCY DEPT VISIT MOD MDM: CPT | Mod: 25

## 2019-08-23 PROCEDURE — 83880 ASSAY OF NATRIURETIC PEPTIDE: CPT

## 2019-08-23 PROCEDURE — 80053 COMPREHEN METABOLIC PANEL: CPT

## 2019-08-23 PROCEDURE — 85025 COMPLETE CBC W/AUTO DIFF WBC: CPT

## 2019-08-23 PROCEDURE — 97530 THERAPEUTIC ACTIVITIES: CPT | Mod: PN

## 2019-08-23 PROCEDURE — 36415 COLL VENOUS BLD VENIPUNCTURE: CPT

## 2019-08-23 RX ORDER — TRAZODONE HYDROCHLORIDE 50 MG/1
50 TABLET ORAL NIGHTLY
Status: ON HOLD | COMMUNITY
End: 2019-10-07 | Stop reason: SDUPTHER

## 2019-08-23 RX ORDER — ASPIRIN 81 MG/1
81 TABLET ORAL 2 TIMES DAILY
Status: ON HOLD | COMMUNITY
End: 2019-09-05 | Stop reason: SDUPTHER

## 2019-08-23 NOTE — PROGRESS NOTES
"  Physical Therapy Daily Treatment Note     Name: Iris Glasgow [facility] Number: [id number]    Therapy Diagnosis:   Encounter Diagnosis   Name Primary?    Primary osteoarthritis of right knee      Physician: Delio Chung MD    Visit Date: 8/23/2019  Physician Orders: PT Eval and Treat    Medical Diagnosis from Referral: R knee OA   Evaluation Date: 8/19/2019  Authorization Period Expiration: 9/2/19   Plan of Care Expiration: 10/4/19   Visit # / Visits authorized: 3      Time In: 1054  Time Out: 1200  Total Billable Time: 60 minutes    Precautions: Standard and Fall    Subjective     Pt reports: she continues to have trouble with sit-stand from toilet and chair,   She states her family makes her walk too much, and she feels like they push her too much, sister stated she is only walking from chair to toilet, and she does not walk enough.  Pt states she feel like her knee has increased in swelling; sister states the posterior portion of her knee has increased in swelling; the sister also stated her knee "was not this red before she got in the car".  They also stated that she sleeps in the recliner due to LBP.  Pain occurs if she lays too flat in the bed.  They prop pillows up, but this does not stop the LBP.  The sister stated she cannot elevate her R LE due to sitting up in the recliner.  Suggested they flex L knee so she can lay further back, and thus be able to elevate RLE.  Also suggested a wedge under upper body to assist with laying in the bed.    She was compliant with home exercise program.  Response to previous treatment:   Functional change:     Pain: 10/10  Location: right knee      Objective     Pt education x 45 minutes:    Pt and sister were educated on energy conservation.  Sit-stand with grab bars and arm rests, and when she is uanble to use these, she depletes her energy for other activities like walking.  Pt has a standard walker, they both state she has another walker at home that has " wheels, but are unsure if it has brakes and a seat.  I requested that they bring the walker with them to the next therapy visit, so we can look at it, and if it is a RW, measure for the correct fit.    Reviewed with both that she does need to ambulate, but not overdo it.  Both verbalized understanding.    They both state that the 3 in 1 does not fit, the toilet area is too small.  They both also stated she does not fit on some of the seats that go on the toilet, they are not wide enough.  I looked on computer and reviewed several AD for toilet and chair to assist with sit-stand.  Instructed pt's sister to look on computer at home, and also to ask MD.    Therapeutic activity x 15 minutes:    Pt requested to ambulate to restroom before leaving.  Waited outside restroom, pt w/o LOB or difficulty.    Home Exercises Provided and Patient Education Provided     Education provided:   - cont HEP, see above    Written Home Exercises Provided: Patient instructed to cont prior HEP.  Exercises were reviewed and Iris was able to demonstrate them prior to the end of the session.  Iris demonstrated good  understanding of the education provided.     See EMR under Patient Instructions for exercises provided prior visit.    Assessment     Pt's leg appears to have increased in edema, redness parvez knee region.  Shiny, tight painful area below knee.   Extra time needed with ambulation.  SOB and fatigue with ambulation and activity.    Iris is progressing well towards her goals.   Pt prognosis is Good.     Pt will continue to benefit from skilled outpatient physical therapy to address the deficits listed in the problem list box on initial evaluation, provide pt/family education and to maximize pt's level of independence in the home and community environment.     Pt's spiritual, cultural and educational needs considered and pt agreeable to plan of care and goals.    Anticipated barriers to physical therapy: none    Goals:    · Non-antalgic, independent gait       Independent step over step stair climbing       Pain-free AROM R knee WFL        At least 4+/5 muscular performance based on MMT of all LE musculature.       Normal, age appropriate balance and proprioception.       Patient is independent with home exercise program      Plan     Cont per POC    Valerie Denise, PTA

## 2019-08-23 NOTE — ED NOTES
"Iris Mueller presents to the ED with c/o right knee pain. Patient reports having knee surgery done one week ago. Patient reports increased pain and "Draining blood occasionally." + for erythema and warmth. Patient is afebrile upon arrival to the ED and is unsure of any temperature at home secondary to "Not having a thermometer." Mucous membranes are pink and moist. Skin is warm, dry and intact. Lungs are clear bilaterally, respirations are regular and unlabored. Denies SOB, cough, congestion or rhinorrhea. BS active x4, no tenderness with palpation, abd is soft and not distended. Denies any appetite or activity change. S1S2, capillary refill is < 2 seconds. Denies dysuria, difficulty urinating, frequency, numbness, tingling or weakness. ALFONSO MERCEDES    "

## 2019-08-23 NOTE — ED PROVIDER NOTES
Encounter Date: 2019    SCRIBE #1 NOTE: I, Chioma Kelly, am scribing for, and in the presence of, Joey Mckeon MD.       History     Chief Complaint   Patient presents with    Joint Swelling     right. had knee replacement by Dr. Chung on        Time seen by provider: 1:00 PM on 2019    Iris Mueller is a 63 y.o. female with HTN who presents to the ED  with an onset of right knee pain and swelling. She had a right knee arthroplasty performed by Orthopedist Dr. Delio Chung 9 days ago. The patient tried contacting Dr. Chung' office PTA and was referred to the ER to rule out an infection as he was gone for the day. She reports developing a hematoma earlier this week and states the swelling has double. The patient has also endorsed intermittent chills but didn't have a thermometer to check her temperature. She denies history of kidney complications or heart failure, worsening chronic SOB, chest pain, or any other symptoms at this time. No additional pertinent PSHx. No known drug allergies.    The history is provided by the patient.     Review of patient's allergies indicates:  No Known Allergies  Past Medical History:   Diagnosis Date    Asthma     Encounter for blood transfusion     Hypertension      Past Surgical History:   Procedure Laterality Date    ARTHROPLASTY, KNEE Right 2019    [facility] at [facility] OR     SECTION      TONSILLECTOMY       History reviewed. No pertinent family history.  Social History     Tobacco Use    Smoking status: Current Every Day Smoker     Packs/day: 0.50     Years: 30.00     Pack years: 15.00     Types: Cigarettes    Smokeless tobacco: Never Used   Substance Use Topics    Alcohol use: Not on file     Comment: RARELY    Drug use: Never     Review of Systems   Constitutional: Positive for chills (intermittent). Negative for fever.   HENT: Negative for nosebleeds.    Eyes: Negative for visual disturbance.    Respiratory: Positive for shortness of breath (chronic, unchanged). Negative for cough.    Cardiovascular: Negative for chest pain and palpitations.   Gastrointestinal: Negative for abdominal pain, diarrhea, nausea and vomiting.   Genitourinary: Negative for dysuria and hematuria.   Musculoskeletal: Positive for arthralgias (right knee) and joint swelling (right knee). Negative for back pain.   Skin: Positive for color change (hematoma, right knee). Negative for rash.   Neurological: Negative for seizures, syncope and headaches.     Physical Exam     Initial Vitals [08/23/19 1230]   BP Pulse Resp Temp SpO2   (!) 127/58 82 20 97.9 °F (36.6 °C) (!) 92 %      MAP       --         Physical Exam    Nursing note and vitals reviewed.  Constitutional: She appears well-developed and well-nourished.  Non-toxic appearance. No distress.   HENT:   Head: Normocephalic and atraumatic.   Eyes: EOM are normal. Pupils are equal, round, and reactive to light.   Neck: Normal range of motion. Neck supple. No neck rigidity. No JVD present.   Cardiovascular: Normal rate, regular rhythm, normal heart sounds and intact distal pulses. Exam reveals no gallop and no friction rub.    No murmur heard.  Pulmonary/Chest: Breath sounds normal. She has no wheezes. She has no rhonchi. She has no rales.   Abdominal: Soft. Bowel sounds are normal. She exhibits no distension. There is no tenderness. There is no rebound and no guarding.   Musculoskeletal: Normal range of motion. She exhibits edema.   2+ BLE pitting edema. Extensive bruising and erythema of the RLE with bandage and sutures in place. See picture below.    Neurological: She is alert and oriented to person, place, and time. She has normal strength and normal reflexes. No cranial nerve deficit or sensory deficit. She exhibits normal muscle tone. Coordination normal. GCS eye subscore is 4. GCS verbal subscore is 5. GCS motor subscore is 6.   Skin: Skin is warm and dry. Bruising noted. There  is erythema.   Psychiatric: She has a normal mood and affect. Her speech is normal and behavior is normal. She is not actively hallucinating.           ED Course   Procedures  Labs Reviewed   CBC W/ AUTO DIFFERENTIAL - Abnormal; Notable for the following components:       Result Value    RBC 2.90 (*)     Hemoglobin 9.1 (*)     Hematocrit 27.8 (*)     Mean Corpuscular Hemoglobin 31.4 (*)     RDW 14.6 (*)     Platelets 428 (*)     MPV 8.3 (*)     Gran # (ANC) 9.5 (*)     Immature Grans (Abs) 0.44 (*)     Gran% 79.9 (*)     Lymph% 9.4 (*)     All other components within normal limits   COMPREHENSIVE METABOLIC PANEL - Abnormal; Notable for the following components:    Sodium 128 (*)     Chloride 93 (*)     Albumin 2.8 (*)     eGFR if  55 (*)     eGFR if non  48 (*)     All other components within normal limits   B-TYPE NATRIURETIC PEPTIDE - Abnormal; Notable for the following components:     (*)     All other components within normal limits        Imaging Results          US Lower Extremity Veins Bilateral (Final result)  Result time 08/23/19 14:31:47    Final result by Sakina Og MD (08/23/19 14:31:47)                 Impression:      No evidence of deep venous thrombosis in either lower extremity.      Electronically signed by: Sakina Og MD  Date:    08/23/2019  Time:    14:31             Narrative:    EXAMINATION:  US LOWER EXTREMITY VEINS BILATERAL    CLINICAL HISTORY:  Other specified soft tissue disorders    TECHNIQUE:  Duplex and color flow Doppler evaluation of the bilateral lower extremity veins was performed.    COMPARISON:  8/19/2019    FINDINGS:  Thigh veins: Both common femoral, femoral, popliteal, proximal medial saphenous, are patent and free of thrombus. The veins are normally compressible and have normal phasic flow and augmentation response.    Calf veins: The paired peroneal and posterior tibial and anterior tibial calf veins are patent  bilaterally.    The calf veins more completely visualized today than on the prior exam although the tech did note that evaluation with compression was limited because of the patient's abutted tolerance of for such                                 Medical Decision Making:   History:   Old Medical Records: I decided to obtain old medical records.  Initial Assessment:   63-year-old woman status post right knee arthroplasty with subsequent hematoma formation presents emergency department for bilateral lower extremity swelling with bruising and some redness in the right lower extremity.  Patient is afebrile well-appearing nontoxic.  She has no leukocytosis.  I have low suspicion that this is cellulitis.  Patient is a GFR of 48 with a normal creatinine of 1.2 and BUN of 21.  BNP is 199.  I have low suspicion to edema secondary to decompensated heart failure or acute kidney injury. Ultrasound performed shows no evidence of DVT.  Patient has not been very mobile or compliant with postoperative therapy.  I have encouraged her to increase mobility to decreased swelling as well as elevate her legs above her heart.  Discussed with Dr. Chung who was wearing will provide close follow-up.  She is to return if she develops fever, worsening redness or pain.  Patient is discharged improved in no acute distress.  Clinical Tests:   Lab Tests: Reviewed and Ordered  Radiological Study: Ordered and Reviewed            Scribe Attestation:   Scribe #1: I performed the above scribed service and the documentation accurately describes the services I performed. I attest to the accuracy of the note.    I, Mike Curry, personally performed the services described in this documentation. All medical record entries made by the scribe were at my direction and in my presence.  I have reviewed the chart and agree that the record reflects my personal performance and is accurate and complete. Joey Mckeon MD.  1:41 PM  08/24/2019       DISCLAIMER: This note was prepared with [employer] voice recognition transcription software. Garbled syntax, mangled pronouns, and other bizarre constructions may be attributed to that software system.          ED Course as of Aug 24 1341   Fri Aug 23, 2019   1455 Attempted to call Dr. Chung. Awaiting call back    [AS]      ED Course User Index  [AS] Joey Mckeon MD     Clinical Impression:       ICD-10-CM ICD-9-CM   1. Leg swelling M79.89 729.81         Disposition:   Disposition: Discharged  Condition: Stable                        Joey Mckeon MD  08/24/19 1341

## 2019-08-23 NOTE — ED NOTES
Upon discharge, patient is AAOx4, no cardiac or respiratory complications. Follow up care reviewed with patient and has been instructed to return to the ER if needed. Patient verbalized understanding and was assisted to vehicle via wheel chair with family. MAGO HAMILTON

## 2019-08-23 NOTE — TELEPHONE ENCOUNTER
----- Message from Holland Parra sent at 8/23/2019 11:12 AM CDT -----  Contact: pt  Patient's knee is painful, red and swollen, PT said she needs to be seen, [phone]

## 2019-08-23 NOTE — PROGRESS NOTES
Past Medical History:   Diagnosis Date    Asthma     Encounter for blood transfusion     Hypertension        Past Surgical History:   Procedure Laterality Date    ARTHROPLASTY, KNEE Right 2019    Performed by [facility] at [facility] OR     SECTION      TONSILLECTOMY         Current Outpatient Medications   Medication Sig    ANORO ELLIPTA 62.5-25 mcg/actuation DsDv     apixaban (ELIQUIS) 2.5 mg Tab Take 1 tablet (2.5 mg total) by mouth 2 (two) times daily.    cyanocobalamin, vitamin B-12, (VITAMIN B-12) 50 mcg tablet Take 50 mcg by mouth once daily.    ferrous sulfate (FEOSOL) 325 mg (65 mg iron) Tab tablet Take 325 mg by mouth daily with breakfast.    FLUoxetine 40 MG capsule     fluticasone (FLONASE) 50 mcg/actuation nasal spray     ibuprofen (ADVIL,MOTRIN) 800 MG tablet Take 1 tablet (800 mg total) by mouth every 6 (six) hours as needed for Pain.    lisinopril-hydrochlorothiazide (PRINZIDE,ZESTORETIC) 20-25 mg Tab Take 1 tablet by mouth once daily.    lovastatin (MEVACOR) 20 MG tablet Take 20 mg by mouth every evening.    VENTOLIN HFA 90 mcg/actuation inhaler     oxyCODONE-acetaminophen (PERCOCET)  mg per tablet Take 1 tablet by mouth every 6 (six) hours as needed for Pain.     No current facility-administered medications for this visit.        Review of patient's allergies indicates:  No Known Allergies    History reviewed. No pertinent family history.    Social History     Socioeconomic History    Marital status:      Spouse name: Not on file    Number of children: Not on file    Years of education: Not on file    Highest education level: Not on file   Occupational History    Not on file   Social Needs    Financial resource strain: Not on file    Food insecurity:     Worry: Not on file     Inability: Not on file    Transportation needs:     Medical: Not on file     Non-medical: Not on file   Tobacco Use    Smoking status: Current Every Day Smoker      Packs/day: 0.50     Years: 30.00     Pack years: 15.00     Types: Cigarettes    Smokeless tobacco: Never Used   Substance and Sexual Activity    Alcohol use: Not on file     Comment: RARELY    Drug use: Never    Sexual activity: Not on file   Lifestyle    Physical activity:     Days per week: Not on file     Minutes per session: Not on file    Stress: Not on file   Relationships    Social connections:     Talks on phone: Not on file     Gets together: Not on file     Attends Rastafari service: Not on file     Active member of club or organization: Not on file     Attends meetings of clubs or organizations: Not on file     Relationship status: Not on file   Other Topics Concern    Not on file   Social History Narrative    Not on file       Chief Complaint:   Chief Complaint   Patient presents with    Post-op Evaluation     S/P RIGHT TKA 8/14/19       Consulting Physician: No ref. provider found    History of present illness: This is a 63 y.o. female following up for right tka 8-14-19.      Review of Systems:    Constitution: Denies chills, fever, and sweats.    HENT: Denies headaches or blurry vision.    Cardiovascular: Denies chest pain or irregular heart beat.    Respiratory: Denies cough or shortness of breath.    Gastrointestinal: Denies abdominal pain, nausea, or vomiting.    Musculoskeletal:  Denies muscle cramps.    Neurological: Denies dizziness or focal weakness.    Psychiatric/Behavioral: Normal mental status.    Hematologic/Lymphatic: Denies bleeding problem or easy bruising/bleeding.    Skin: Denies rash or suspicious lesions.      Examination:    Vital Signs:    Vitals:    08/19/19 1448   Resp: 18       Body mass index is 48.47 kg/m².    This a well-developed, well nourished patient in no acute distress.    Alert and oriented and cooperative to examination.       Physical Exam: right knee    Inspection/Observation   Swelling:   mild  Erythema:   none  Bruising:   none  Wounds:   Clean and  dry  Drainage:  None    Range of Motion   Limited    Muscle Strength   Limited    Other   Sensation:  normal  Pulse:    palpable    Imaging: Normal post-operative XR     Assessment: S/P total knee arthroplasty, right        Plan: She has a hematoma and had some drainage. Family reports she is not walking much. Pain 10/10 per patient but she is sitting comfortably in WC today and had no pain with exam. Will US to rule out DVT. Increase PT visits to improve mobilization. Back in 4 weeks.      DISCLAIMER: This note may have been dictated using voice recognition software and may contain grammatical errors. Report sent to referring provider as required.

## 2019-08-23 NOTE — TELEPHONE ENCOUNTER
Pt's niece arrived at the clinic today, stating patient just left Physical Therapy and her knee is red, swollen, and warm to the touch. Advised Dr Chung is gone for the day and to go to the ER. Advised ER can due necessary test and check patients knee to make sure there's no infection. Verbalized understanding.

## 2019-08-26 ENCOUNTER — OFFICE VISIT (OUTPATIENT)
Dept: ORTHOPEDICS | Facility: CLINIC | Age: 64
End: 2019-08-26
Payer: MEDICARE

## 2019-08-26 ENCOUNTER — CLINICAL SUPPORT (OUTPATIENT)
Dept: REHABILITATION | Facility: HOSPITAL | Age: 64
End: 2019-08-26
Attending: ORTHOPAEDIC SURGERY
Payer: MEDICARE

## 2019-08-26 VITALS — HEIGHT: 62 IN | WEIGHT: 265 LBS | RESPIRATION RATE: 20 BRPM | BODY MASS INDEX: 48.76 KG/M2

## 2019-08-26 DIAGNOSIS — Z96.651 S/P TOTAL KNEE REPLACEMENT, RIGHT: Primary | ICD-10-CM

## 2019-08-26 DIAGNOSIS — M17.11 PRIMARY OSTEOARTHRITIS OF RIGHT KNEE: ICD-10-CM

## 2019-08-26 PROCEDURE — 97110 THERAPEUTIC EXERCISES: CPT | Mod: PN

## 2019-08-26 PROCEDURE — 99024 POSTOP FOLLOW-UP VISIT: CPT | Mod: S$GLB,,, | Performed by: ORTHOPAEDIC SURGERY

## 2019-08-26 PROCEDURE — 99999 PR PBB SHADOW E&M-EST. PATIENT-LVL III: ICD-10-PCS | Mod: PBBFAC,,, | Performed by: ORTHOPAEDIC SURGERY

## 2019-08-26 PROCEDURE — 99999 PR PBB SHADOW E&M-EST. PATIENT-LVL III: CPT | Mod: PBBFAC,,, | Performed by: ORTHOPAEDIC SURGERY

## 2019-08-26 PROCEDURE — 99024 PR POST-OP FOLLOW-UP VISIT: ICD-10-PCS | Mod: S$GLB,,, | Performed by: ORTHOPAEDIC SURGERY

## 2019-08-26 NOTE — PROGRESS NOTES
Physical Therapy Daily Treatment Note     Name: Iris [facility] Number: [id number]    Therapy Diagnosis:   Encounter Diagnosis   Name Primary?    Primary osteoarthritis of right knee      Physician: Delio Chung MD    Visit Date: 8/26/2019  Physician Orders: PT Eval and Treat    Medical Diagnosis from Referral: R knee OA   Evaluation Date: 8/19/2019  Authorization Period Expiration: 9/2/19   Plan of Care Expiration: 10/4/19   Visit # / Visits authorized: 3    Time In: 100 PM   Time Out: 200 PM   Total Billable Time: 60  minutes    Precautions: Standard and Fall    Subjective     Pt reports: went to the ER last week, cleared of infection/blood clots.   She has a check up with Dr. Llamas later today.   She was compliant with home exercise program.  Response to previous treatment:   Functional change:     Pain: 10/10,  Location: right knee      Objective     Iris received therapeutic exercises to develop strength, ROM and flexibility for 60  minutes including:    Seated:  QS x  20  GS x 20  HR/TR x 20  Heel slides x 20, with assistance to increase knee flexion  Gastroc stretch 5 x 15 sec  Hip adduction x 20  Hip abduction GTB x 20  LAQ x 20        Home Exercises Provided and Patient Education Provided     Education provided:   - cont HEP    Written Home Exercises Provided: Patient instructed to cont prior HEP.  Exercises were reviewed and Iris was able to demonstrate them prior to the end of the session.  Iris demonstrated good  understanding of the education provided.     See EMR under Patient Instructions for exercises provided prior visit.    Assessment      Pt reported increased pain upon completion of therapy.    Iris is progressing well towards her goals.   Pt prognosis is Good.     Pt will continue to benefit from skilled outpatient physical therapy to address the deficits listed in the problem list box on initial evaluation, provide pt/family education and to maximize pt's  level of independence in the home and community environment.     Pt's spiritual, cultural and educational needs considered and pt agreeable to plan of care and goals.    Anticipated barriers to physical therapy: none    Goals:   · Non-antalgic, independent gait       Independent step over step stair climbing       Pain-free AROM R knee WFL        At least 4+/5 muscular performance based on MMT of all LE musculature.       Normal, age appropriate balance and proprioception.       Patient is independent with home exercise program      Plan     Cont per POC    Teofilo Cameron, PT

## 2019-08-27 NOTE — PROGRESS NOTES
Past Medical History:   Diagnosis Date    Asthma     Encounter for blood transfusion     Hypertension        Past Surgical History:   Procedure Laterality Date    ARTHROPLASTY, KNEE Right 2019    [facility]     SECTION      TONSILLECTOMY         Current Outpatient Medications   Medication Sig    ANORO ELLIPTA 62.5-25 mcg/actuation DsDv Inhale 1 puff into the lungs daily as needed (SOB).     aspirin (ECOTRIN) 81 MG EC tablet Take 162 mg by mouth once daily. 2 tablets daily    cyanocobalamin, vitamin B-12, (VITAMIN B-12) 50 mcg tablet Take 50 mcg by mouth twice a week.     ferrous sulfate (FEOSOL) 325 mg (65 mg iron) Tab tablet Take 325 mg by mouth twice a week.     FLUoxetine 40 MG capsule Take 40 mg by mouth once daily.     fluticasone (FLONASE) 50 mcg/actuation nasal spray 1 spray by Each Nostril route every evening.     ibuprofen (ADVIL,MOTRIN) 800 MG tablet Take 1 tablet (800 mg total) by mouth every 6 (six) hours as needed for Pain. (Patient taking differently: Take 800 mg by mouth every 6 (six) hours as needed for Pain. Take for 22 days.)    lisinopril-hydrochlorothiazide (PRINZIDE,ZESTORETIC) 20-25 mg Tab Take 1 tablet by mouth once daily.    lovastatin (MEVACOR) 20 MG tablet Take 20 mg by mouth every evening.    oxyCODONE-acetaminophen (PERCOCET)  mg per tablet Take 1 tablet by mouth every 6 (six) hours as needed for Pain. (Patient taking differently: Take 1 tablet by mouth every 6 (six) hours as needed for Pain. Take for 7 days.)    traZODone (DESYREL) 50 MG tablet Take 50 mg by mouth every evening.    VENTOLIN HFA 90 mcg/actuation inhaler Inhale 1 puff into the lungs daily as needed for Wheezing or Shortness of Breath.      No current facility-administered medications for this visit.        Review of patient's allergies indicates:  No Known Allergies    History reviewed. No pertinent family history.    Social History      Socioeconomic History    Marital status:      Spouse name: Not on file    Number of children: Not on file    Years of education: Not on file    Highest education level: Not on file   Occupational History    Not on file   Social Needs    Financial resource strain: Not on file    Food insecurity:     Worry: Not on file     Inability: Not on file    Transportation needs:     Medical: Not on file     Non-medical: Not on file   Tobacco Use    Smoking status: Current Every Day Smoker     Packs/day: 0.50     Years: 30.00     Pack years: 15.00     Types: Cigarettes    Smokeless tobacco: Never Used   Substance and Sexual Activity    Alcohol use: Not on file     Comment: RARELY    Drug use: Never    Sexual activity: Not on file   Lifestyle    Physical activity:     Days per week: Not on file     Minutes per session: Not on file    Stress: Not on file   Relationships    Social connections:     Talks on phone: Not on file     Gets together: Not on file     Attends Anglican service: Not on file     Active member of club or organization: Not on file     Attends meetings of clubs or organizations: Not on file     Relationship status: Not on file   Other Topics Concern    Not on file   Social History Narrative    Not on file       Chief Complaint:   Chief Complaint   Patient presents with    Post-op Evaluation     S/P RIGHT TKA 8/14/19       Consulting Physician: No ref. provider found    History of present illness: This is a 63 y.o. female following up for right tka 8-14-19.      Review of Systems:    Constitution: Denies chills, fever, and sweats.    HENT: Denies headaches or blurry vision.    Cardiovascular: Denies chest pain or irregular heart beat.    Respiratory: Denies cough or shortness of breath.    Gastrointestinal: Denies abdominal pain, nausea, or vomiting.    Musculoskeletal:  Denies muscle cramps.    Neurological: Denies dizziness or focal weakness.    Psychiatric/Behavioral: Normal  mental status.    Hematologic/Lymphatic: Denies bleeding problem or easy bruising/bleeding.    Skin: Denies rash or suspicious lesions.      Examination:    Vital Signs:    Vitals:    08/26/19 1430   Resp: 20       Body mass index is 48.47 kg/m².    This a well-developed, well nourished patient in no acute distress.    Alert and oriented and cooperative to examination.       Physical Exam: right knee    Inspection/Observation   Swelling:   mild  Erythema:   Mild below the incision down to the ankle.  Bruising:   none  Wounds:   Clean with blood-tinged serous drainage from 2 small openings  Drainage:  Bloody serous    Range of Motion   0-80    Muscle Strength   4    Other   Sensation:  normal  Pulse:    palpable    Imaging: Normal post-operative XR     Assessment: S/P total knee replacement, right        Plan: She had a hematoma and had some drainage. Family reports she is not walking much. Pain 8/10 per patient but she is sitting comfortably in WC today and had no pain with exam.  She was seen in the emergency room over the weekend and ultrasound showed no clots.  She reports that she is having some cough and swelling of this extremity.  We will have her follow up with her PCP due to some high lab values from her ER visit.  Will have her stop taking ibuprofen.  We will see her back in 2 weeks.  We have also had a long discussion with her about stopping smoking.  She also needs to ambulate more.    DISCLAIMER: This note may have been dictated using voice recognition software and may contain grammatical errors. Report sent to referring provider as required.

## 2019-08-29 ENCOUNTER — TELEPHONE (OUTPATIENT)
Dept: ORTHOPEDICS | Facility: CLINIC | Age: 64
End: 2019-08-29

## 2019-08-29 ENCOUNTER — HOSPITAL ENCOUNTER (INPATIENT)
Facility: HOSPITAL | Age: 64
LOS: 7 days | Discharge: NURSING, REHAB OR LONG-TERM CARE | DRG: 856 | End: 2019-09-05
Attending: EMERGENCY MEDICINE | Admitting: INTERNAL MEDICINE
Payer: MEDICARE

## 2019-08-29 DIAGNOSIS — I10 ESSENTIAL HYPERTENSION: ICD-10-CM

## 2019-08-29 DIAGNOSIS — E66.01 MORBID OBESITY: ICD-10-CM

## 2019-08-29 DIAGNOSIS — A41.9 SEVERE SEPSIS: ICD-10-CM

## 2019-08-29 DIAGNOSIS — M79.7 FIBROMYALGIA: ICD-10-CM

## 2019-08-29 DIAGNOSIS — R65.20 SEVERE SEPSIS: ICD-10-CM

## 2019-08-29 DIAGNOSIS — R65.21 SEPTIC SHOCK: ICD-10-CM

## 2019-08-29 DIAGNOSIS — A41.9 SEPTIC SHOCK: ICD-10-CM

## 2019-08-29 DIAGNOSIS — M17.11 PRIMARY OSTEOARTHRITIS OF RIGHT KNEE: ICD-10-CM

## 2019-08-29 DIAGNOSIS — T81.49XA INFECTED INCISION: Primary | ICD-10-CM

## 2019-08-29 LAB
ALBUMIN SERPL BCP-MCNC: 2.9 G/DL (ref 3.5–5.2)
ALP SERPL-CCNC: 94 U/L (ref 55–135)
ALT SERPL W/O P-5'-P-CCNC: 11 U/L (ref 10–44)
ANION GAP SERPL CALC-SCNC: 8 MMOL/L (ref 8–16)
AST SERPL-CCNC: 11 U/L (ref 10–40)
BASOPHILS # BLD AUTO: 0.02 K/UL (ref 0–0.2)
BASOPHILS NFR BLD: 0.2 % (ref 0–1.9)
BILIRUB SERPL-MCNC: 0.7 MG/DL (ref 0.1–1)
BUN SERPL-MCNC: 18 MG/DL (ref 8–23)
CALCIUM SERPL-MCNC: 9.2 MG/DL (ref 8.7–10.5)
CHLORIDE SERPL-SCNC: 96 MMOL/L (ref 95–110)
CO2 SERPL-SCNC: 26 MMOL/L (ref 23–29)
CREAT SERPL-MCNC: 1.1 MG/DL (ref 0.5–1.4)
CRP SERPL-MCNC: 118.5 MG/L (ref 0–8.2)
DIFFERENTIAL METHOD: ABNORMAL
EOSINOPHIL # BLD AUTO: 0.1 K/UL (ref 0–0.5)
EOSINOPHIL NFR BLD: 0.6 % (ref 0–8)
ERYTHROCYTE [DISTWIDTH] IN BLOOD BY AUTOMATED COUNT: 15.8 % (ref 11.5–14.5)
ERYTHROCYTE [SEDIMENTATION RATE] IN BLOOD BY WESTERGREN METHOD: 82 MM/HR (ref 0–20)
EST. GFR  (AFRICAN AMERICAN): >60 ML/MIN/1.73 M^2
EST. GFR  (NON AFRICAN AMERICAN): 53 ML/MIN/1.73 M^2
GLUCOSE SERPL-MCNC: 96 MG/DL (ref 70–110)
HCT VFR BLD AUTO: 28.7 % (ref 37–48.5)
HGB BLD-MCNC: 9.4 G/DL (ref 12–16)
IMM GRANULOCYTES # BLD AUTO: 0.15 K/UL (ref 0–0.04)
LYMPHOCYTES # BLD AUTO: 1.5 K/UL (ref 1–4.8)
LYMPHOCYTES NFR BLD: 11.7 % (ref 18–48)
MCH RBC QN AUTO: 31.6 PG (ref 27–31)
MCHC RBC AUTO-ENTMCNC: 32.8 G/DL (ref 32–36)
MCV RBC AUTO: 97 FL (ref 82–98)
MONOCYTES # BLD AUTO: 1 K/UL (ref 0.3–1)
MONOCYTES NFR BLD: 7.4 % (ref 4–15)
NEUTROPHILS # BLD AUTO: 10.4 K/UL (ref 1.8–7.7)
NEUTROPHILS NFR BLD: 79 % (ref 38–73)
NRBC BLD-RTO: 0 /100 WBC
PLATELET # BLD AUTO: 437 K/UL (ref 150–350)
PMV BLD AUTO: 8.4 FL (ref 9.2–12.9)
POTASSIUM SERPL-SCNC: 3.9 MMOL/L (ref 3.5–5.1)
PROT SERPL-MCNC: 6.4 G/DL (ref 6–8.4)
RBC # BLD AUTO: 2.97 M/UL (ref 4–5.4)
SODIUM SERPL-SCNC: 130 MMOL/L (ref 136–145)
WBC # BLD AUTO: 13.12 K/UL (ref 3.9–12.7)

## 2019-08-29 PROCEDURE — 85025 COMPLETE CBC W/AUTO DIFF WBC: CPT

## 2019-08-29 PROCEDURE — 80053 COMPREHEN METABOLIC PANEL: CPT

## 2019-08-29 PROCEDURE — 87070 CULTURE OTHR SPECIMN AEROBIC: CPT

## 2019-08-29 PROCEDURE — 87186 SC STD MICRODIL/AGAR DIL: CPT

## 2019-08-29 PROCEDURE — 99900035 HC TECH TIME PER 15 MIN (STAT)

## 2019-08-29 PROCEDURE — 63600175 PHARM REV CODE 636 W HCPCS: Performed by: EMERGENCY MEDICINE

## 2019-08-29 PROCEDURE — 12000002 HC ACUTE/MED SURGE SEMI-PRIVATE ROOM

## 2019-08-29 PROCEDURE — 99285 EMERGENCY DEPT VISIT HI MDM: CPT | Mod: 25

## 2019-08-29 PROCEDURE — 96365 THER/PROPH/DIAG IV INF INIT: CPT

## 2019-08-29 PROCEDURE — 96375 TX/PRO/DX INJ NEW DRUG ADDON: CPT

## 2019-08-29 PROCEDURE — 36415 COLL VENOUS BLD VENIPUNCTURE: CPT

## 2019-08-29 PROCEDURE — 25000242 PHARM REV CODE 250 ALT 637 W/ HCPCS: Performed by: INTERNAL MEDICINE

## 2019-08-29 PROCEDURE — 25000003 PHARM REV CODE 250: Performed by: INTERNAL MEDICINE

## 2019-08-29 PROCEDURE — 94760 N-INVAS EAR/PLS OXIMETRY 1: CPT

## 2019-08-29 PROCEDURE — 86140 C-REACTIVE PROTEIN: CPT

## 2019-08-29 PROCEDURE — 85651 RBC SED RATE NONAUTOMATED: CPT

## 2019-08-29 PROCEDURE — 87077 CULTURE AEROBIC IDENTIFY: CPT | Mod: 59

## 2019-08-29 RX ORDER — CLINDAMYCIN HYDROCHLORIDE 300 MG/1
300 CAPSULE ORAL 3 TIMES DAILY
Status: ON HOLD | COMMUNITY
End: 2019-09-05 | Stop reason: HOSPADM

## 2019-08-29 RX ORDER — PANTOPRAZOLE SODIUM 40 MG/1
40 TABLET, DELAYED RELEASE ORAL DAILY
Status: DISCONTINUED | OUTPATIENT
Start: 2019-08-30 | End: 2019-09-05 | Stop reason: HOSPADM

## 2019-08-29 RX ORDER — ASPIRIN 81 MG/1
81 TABLET ORAL 2 TIMES DAILY
Status: DISCONTINUED | OUTPATIENT
Start: 2019-08-30 | End: 2019-09-05 | Stop reason: HOSPADM

## 2019-08-29 RX ORDER — TRAZODONE HYDROCHLORIDE 50 MG/1
50 TABLET ORAL NIGHTLY
Status: DISCONTINUED | OUTPATIENT
Start: 2019-08-29 | End: 2019-09-05 | Stop reason: HOSPADM

## 2019-08-29 RX ORDER — FERROUS SULFATE 325(65) MG
325 TABLET, DELAYED RELEASE (ENTERIC COATED) ORAL DAILY
Status: DISCONTINUED | OUTPATIENT
Start: 2019-08-30 | End: 2019-09-02

## 2019-08-29 RX ORDER — SODIUM CHLORIDE 0.9 % (FLUSH) 0.9 %
10 SYRINGE (ML) INJECTION
Status: DISCONTINUED | OUTPATIENT
Start: 2019-08-29 | End: 2019-09-05 | Stop reason: HOSPADM

## 2019-08-29 RX ORDER — IPRATROPIUM BROMIDE AND ALBUTEROL SULFATE 2.5; .5 MG/3ML; MG/3ML
3 SOLUTION RESPIRATORY (INHALATION) EVERY 6 HOURS PRN
Status: DISCONTINUED | OUTPATIENT
Start: 2019-08-29 | End: 2019-08-29 | Stop reason: SDUPTHER

## 2019-08-29 RX ORDER — FLUOXETINE HYDROCHLORIDE 20 MG/1
40 CAPSULE ORAL DAILY
Status: DISCONTINUED | OUTPATIENT
Start: 2019-08-30 | End: 2019-09-05 | Stop reason: HOSPADM

## 2019-08-29 RX ORDER — OXYCODONE AND ACETAMINOPHEN 10; 325 MG/1; MG/1
1 TABLET ORAL EVERY 6 HOURS PRN
Status: DISCONTINUED | OUTPATIENT
Start: 2019-08-29 | End: 2019-09-01

## 2019-08-29 RX ORDER — LOVASTATIN 20 MG/1
20 TABLET ORAL NIGHTLY
Status: DISCONTINUED | OUTPATIENT
Start: 2019-08-29 | End: 2019-09-05 | Stop reason: HOSPADM

## 2019-08-29 RX ORDER — IPRATROPIUM BROMIDE AND ALBUTEROL SULFATE 2.5; .5 MG/3ML; MG/3ML
3 SOLUTION RESPIRATORY (INHALATION) EVERY 6 HOURS PRN
Status: DISCONTINUED | OUTPATIENT
Start: 2019-08-29 | End: 2019-09-05 | Stop reason: HOSPADM

## 2019-08-29 RX ORDER — FLUTICASONE PROPIONATE 50 MCG
1 SPRAY, SUSPENSION (ML) NASAL NIGHTLY
Status: DISCONTINUED | OUTPATIENT
Start: 2019-08-29 | End: 2019-09-05 | Stop reason: HOSPADM

## 2019-08-29 RX ADMIN — PIPERACILLIN AND TAZOBACTAM 4.5 G: 4; .5 INJECTION, POWDER, LYOPHILIZED, FOR SOLUTION INTRAVENOUS; PARENTERAL at 05:08

## 2019-08-29 RX ADMIN — TRAZODONE HYDROCHLORIDE 50 MG: 50 TABLET ORAL at 09:08

## 2019-08-29 RX ADMIN — VANCOMYCIN HYDROCHLORIDE 1750 MG: 1 INJECTION, POWDER, LYOPHILIZED, FOR SOLUTION INTRAVENOUS at 05:08

## 2019-08-29 RX ADMIN — FLUTICASONE PROPIONATE 50 MCG: 50 SPRAY, METERED NASAL at 09:08

## 2019-08-29 RX ADMIN — LOVASTATIN 20 MG: 20 TABLET ORAL at 09:08

## 2019-08-29 RX ADMIN — OXYCODONE AND ACETAMINOPHEN 1 TABLET: 10; 325 TABLET ORAL at 08:08

## 2019-08-29 RX ADMIN — SODIUM CHLORIDE 500 ML: 0.9 INJECTION, SOLUTION INTRAVENOUS at 04:08

## 2019-08-29 RX ADMIN — Medication 50 MCG: at 09:08

## 2019-08-29 NOTE — H&P
PCP: Elkin You MD    History & Physical    Chief Complaint:  Right knee incision drainage    History of Present Illness:  Patient is a 63 y.o. female admitted to Hospitalist Service from [facility] Emergency Room with complaint of drainage from right knee incision. Patient reportedly has past medical history significant for hypertension and bronchial asthma. Patient underwent right total knee arthroplasty performed by Dr. Chung on 2019.  Patient presented to the emergency room with complaint of bloody drainage which is foul smelling from right knee incision.  Patient reports she was seen by primary care physician on 2019 when a hematoma was expressed out of the right knee incision manually.  Patient was started on oral clindamycin 300 mg 3 times daily.  Patient denied any associated fever, chills or any recent trauma to knee joint. Patient denied chest pain, shortness of breath, abdominal pain, nausea, vomiting, headache, vision changes, focal neuro-deficits, cough or fever.    Past Medical History:   Diagnosis Date    Asthma     Encounter for blood transfusion     Hypertension      Past Surgical History:   Procedure Laterality Date    ARTHROPLASTY, KNEE Right 2019    Performed by [facility] at [facility]     SECTION      TONSILLECTOMY       History reviewed. No pertinent family history.  Social History     Tobacco Use    Smoking status: Current Every Day Smoker     Packs/day: 0.50     Years: 30.00     Pack years: 15.00     Types: Cigarettes    Smokeless tobacco: Never Used   Substance Use Topics    Alcohol use: Not on file     Comment: RARELY    Drug use: Never      Review of patient's allergies indicates:  No Known Allergies    (Not in a hospital admission)  Review of Systems:  Constitutional: no fever or chills  Eyes: no visual changes  Ears, nose, mouth, throat, and face: no nasal congestion or sore throat  Respiratory: no cough or shorness  of breath  Cardiovascular: no chest pain or palpitations  Gastrointestinal: no nausea or vomiting, no abdominal pain or change in bowel habits  Genitourinary: no hematuria or dysuria  Integument/breast:  Positive for right knee incision redness and drainage  Hematologic/lymphatic: no easy bruising or lymphadenopathy  Musculoskeletal: no arthralgias or myalgias  Neurological: no seizures or tremors.  Behavioral/Psych: no auditory or visual hallucinations  Endocrine: no heat or cold intolerance     OBJECTIVE:     Vital Signs (Most Recent)  Temp: 99.1 °F (37.3 °C) (08/29/19 1547)  Pulse: 73 (08/29/19 1648)  Resp: 16 (08/29/19 1547)  BP: (!) 82/51 (08/29/19 1648)  SpO2: 98 % (08/29/19 1648)    Physical Exam:  General appearance: well developed, appears stated age  Head: normocephalic, atraumatic  Eyes:  conjunctivae/corneas clear. PERRL.  Nose: Nares normal. Septum midline.  Throat: lips, mucosa, and tongue normal; teeth and gums normal, no throat erythema.  Neck: supple, symmetrical, trachea midline, no JVD and thyroid not enlarged, symmetric, no tenderness/mass/nodules  Lungs:  clear to auscultation bilaterally and normal respiratory effort  Chest wall: no tenderness  Heart: regular rate and rhythm, S1, S2 normal, no murmur, click, rub or gallop  Abdomen: soft, non-tender non-distended; bowel sounds normal; no masses,  no organomegaly  Extremities: no cyanosis, clubbing or edema. Erythema of right knee extending up to the medial malleolus and heel, wound dehiscence in the center part of vertical knee incision with residual possible fluctuant hematoma medially, foul smelling discharge which is serosanguineous.  2+ pitting edema and right calf firmness noted. Range of motion is not painful.  Pulses: 2+ and symmetric  Skin: Skin color, texture, turgor normal. No rashes or lesions.  Lymph nodes: Cervical, supraclavicular, and axillary nodes normal.  Neurologic: Normal strength and tone. No focal numbness or weakness.  CNII-XII intact.      Laboratory:   CBC:   Recent Labs   Lab 08/29/19  1630   WBC 13.12*   RBC 2.97*   HGB 9.4*   HCT 28.7*   *   MCV 97   MCH 31.6*   MCHC 32.8     CMP:   Recent Labs   Lab 08/29/19  1630   GLU 96   CALCIUM 9.2   ALBUMIN 2.9*   PROT 6.4   *   K 3.9   CO2 26   CL 96   BUN 18   CREATININE 1.1   ALKPHOS 94   ALT 11   AST 11   BILITOT 0.7     CRP: 118  ESR: 82    No results found for: HGBA1C  Microbiology Results (last 7 days)     Procedure Component Value Units Date/Time    Aerobic culture [[id number]] Collected:  08/29/19 1634    Order Status:  Sent Specimen:  Incision site from Leg, Right Updated:  08/29/19 1635        Diagnostic Results:  Right knee x-ray: pending.  Assessment/Plan:     Active Hospital Problems    Diagnosis  POA    Severe Sepsis with post-op right knee wound infection  Postop wound infection of right knee joint and right leg cellulitis  Osteoarthritis of right knee status post right total knee arthroplasty (August 14, 2019)  Consult Dr. Chung from Orthopedics.  NPO after midnight. Likely would require orthopedic intervention.  Follow microbiology-wound culture results.  Consult ID specialist.  Continue intravenous vancomycin and Zosyn antibiotic therapy.  Pharmacy to dose vancomycin.  Pain control with oral narcotics.  Continue physical therapy.  Observe fall precautions.  RLE venous doppler scan to rule out DVT.    Yes    Essential hypertension [I10]  Chronic problem. Will continue chronic medications and monitor for any changes, adjusting as needed.    Yes    Fibromyalgia [M79.7]  As above.    Yes    Morbid obesity [E66.01]  Body mass index is 48.47 kg/m². Morbid obesity complicates all aspects of disease management from diagnostic modalities to treatment. Weight loss encouraged and health benefits explained to patient.  Yes           DVT prophylaxis:  Use sequential compression stockings and Brennan hose to left leg.  Resume anticoagulation therapy in a.m. after  surgical intervention    Discussed with multiple family members, answered all questions.     Yazmin Jules MD  Department of Hospital Medicine   [facility]

## 2019-08-29 NOTE — ED PROVIDER NOTES
Encounter Date: 2019    SCRIBE #1 NOTE: Izzy DE LA TORRE, am scribing for, and in the presence of, Nazario Torres MD.       History     Chief Complaint   Patient presents with    Knee Pain     R knee pain with drainage of hematoma s/p surgery       Time seen by provider: 3:59 PM on 2019    Iris Mueller is a 63 y.o. female with PMHx of HTN who presents to the ED with complaints of right knee drainage from an incision and RLE redness s/p a right TKA that occurred x2 weeks ago () performed by Dr. Chung. Drainage is described as bloody with a foul odor. The patient followed up with her PCP x3 days ago () where a hematoma was expressed out of the incision. She was given a prescription of 300 mg Clindamycin TID. The patient started the antibiotic when prescribed. The patient denies recent fevers, vomiting, chest pain, difficulty breathing, or onset of any other new symptoms currently. The patient has no other medical concerns or complaints at this moment. SHx includes right knee arthroplasty and . NKDA noted.     The history is provided by the patient.     Review of patient's allergies indicates:  No Known Allergies  Past Medical History:   Diagnosis Date    Asthma     Encounter for blood transfusion     Hypertension      Past Surgical History:   Procedure Laterality Date    ARTHROPLASTY, KNEE Right 2019    Performed by [facility]io [facility] at [facility] OR     SECTION      TONSILLECTOMY       History reviewed. No pertinent family history.  Social History     Tobacco Use    Smoking status: Current Every Day Smoker     Packs/day: 0.50     Years: 30.00     Pack years: 15.00     Types: Cigarettes    Smokeless tobacco: Never Used   Substance Use Topics    Alcohol use: Not on file     Comment: RARELY    Drug use: Never     Review of Systems   Constitutional: Negative for chills and fever.   Respiratory: Negative for shortness of breath.    Cardiovascular: Negative  for chest pain.   Gastrointestinal: Negative for nausea and vomiting.   Genitourinary: Negative for difficulty urinating.   Musculoskeletal: Negative for myalgias.   Skin: Positive for color change (redness; RLE) and wound (drainage from incision; right knee). Negative for pallor and rash.   Neurological: Negative for weakness and numbness.   Hematological: Does not bruise/bleed easily.   Psychiatric/Behavioral: The patient is not nervous/anxious.        Physical Exam     Initial Vitals [08/29/19 1547]   BP Pulse Resp Temp SpO2   113/61 77 16 99.1 °F (37.3 °C) (!) 94 %      MAP       --         Physical Exam    Nursing note and vitals reviewed.  Constitutional: She appears well-developed and well-nourished. She is not diaphoretic. No distress.   HENT:   Head: Normocephalic and atraumatic.   Eyes: EOM are normal.   Neck: Normal range of motion.   Cardiovascular: Normal rate, regular rhythm, normal heart sounds and intact distal pulses. Exam reveals no gallop and no friction rub.    No murmur heard.  Pulmonary/Chest: Breath sounds normal. No respiratory distress. She has no wheezes. She has no rhonchi. She has no rales.   Abdominal: Soft. There is no tenderness.   Musculoskeletal: Normal range of motion.        Right knee: She exhibits swelling.        Right lower leg: She exhibits swelling and edema.   Swelling and edema from the right knee down. Redness from the knee extending to the lower leg with surrounding ecchymosis. Lower part of the incision, approximated 1 cm, open with foul odor noted. No active drainage appreciated. Sanguinous drainage noted to the dressing.    Neurological: She is alert and oriented to person, place, and time.   Skin: Skin is warm and dry.   Psychiatric: She has a normal mood and affect. Her behavior is normal. Judgment and thought content normal.         ED Course   Procedures  Labs Reviewed   COMPREHENSIVE METABOLIC PANEL - Abnormal; Notable for the following components:       Result  Value    Sodium 130 (*)     Albumin 2.9 (*)     eGFR if non  53 (*)     All other components within normal limits   CBC W/ AUTO DIFFERENTIAL - Abnormal; Notable for the following components:    WBC 13.12 (*)     RBC 2.97 (*)     Hemoglobin 9.4 (*)     Hematocrit 28.7 (*)     Mean Corpuscular Hemoglobin 31.6 (*)     RDW 15.8 (*)     Platelets 437 (*)     MPV 8.4 (*)     Gran # (ANC) 10.4 (*)     Immature Grans (Abs) 0.15 (*)     Gran% 79.0 (*)     Lymph% 11.7 (*)     All other components within normal limits   C-REACTIVE PROTEIN - Abnormal; Notable for the following components:    .5 (*)     All other components within normal limits   CULTURE, AEROBIC  (SPECIFY SOURCE)   SEDIMENTATION RATE          Imaging Results    None          Medical Decision Making:   History:   Old Medical Records: I decided to obtain old medical records.  Initial Assessment:   63-year-old female presented with a chief complaint of knee pain.  Differential Diagnosis:   Initial differential diagnosis includes but is not limited to: cellulitis, abscess, and infected hematoma.   Clinical Tests:   Lab Tests: Reviewed and Ordered  ED Management:  The patient was emergently evaluated in the emergency department, her evaluation was significant for an elderly female with a recent incision noted to the right knee with opening of the lower aspect of the incision and a foul odor with a sanguinous drainage noted from the incision.  The Patient's labs were significant for an elevated CRP and a mildly elevated white blood cell count.  The patient likely has an infection of the incision and possibly the hardware in her knee.  The patient has failed outpatient antibiotic therapy.  The case was discussed her orthopedic physician, Dr. Chung.  He has asked  for the patient to be admitted to the hospitalist service and to start her on IV antibiotics.  The patient was started on IV Zosyn and IV vancomycin.  The case was discussed with the  hospitalist on call, Dr. Jules.  He has accepted the patient for admission.  The patient has no SIRS criteria, so that I doubt an acute sepsis from her knee as well.  Other:   I have discussed this case with another health care provider.            Scribe Attestation:   Scribe #1: I performed the above scribed service and the documentation accurately describes the services I performed. I attest to the accuracy of the note.        I, Dr. Nazario Torres, personally performed the services described in this documentation. All medical record entries made by the scribe were at my direction and in my presence.  I have reviewed the chart and agree that the record reflects my personal performance and is accurate and complete. Nazario Torres MD.  9:02 PM 08/29/2019          Clinical Impression:       ICD-10-CM ICD-9-CM   1. Infected incision T81.49XA 998.59         Disposition:   Disposition: Admitted                        Nazairo Torres MD  08/29/19 2108

## 2019-08-29 NOTE — TELEPHONE ENCOUNTER
Received call from pt's sister/ caretaker.  They state that PCP pushed on lower leg on Tuesday to push out hematoma, since this time lower aspect of incision is open and actively bleeding.  Noticed today that also has foul odor.  Concern for internal dehiscence of incision and infection, advised that ER evaluation may be in order.  They will take her to ER for eval since Dr. Chung is in surgery today and not in clinic.

## 2019-08-30 ENCOUNTER — ANESTHESIA (OUTPATIENT)
Dept: SURGERY | Facility: HOSPITAL | Age: 64
DRG: 856 | End: 2019-08-30
Payer: MEDICARE

## 2019-08-30 ENCOUNTER — ANESTHESIA EVENT (OUTPATIENT)
Dept: SURGERY | Facility: HOSPITAL | Age: 64
DRG: 856 | End: 2019-08-30
Payer: MEDICARE

## 2019-08-30 LAB
ANION GAP SERPL CALC-SCNC: 10 MMOL/L (ref 8–16)
BASOPHILS # BLD AUTO: 0.02 K/UL (ref 0–0.2)
BASOPHILS # BLD AUTO: 0.03 K/UL (ref 0–0.2)
BASOPHILS NFR BLD: 0.2 % (ref 0–1.9)
BASOPHILS NFR BLD: 0.3 % (ref 0–1.9)
BUN SERPL-MCNC: 15 MG/DL (ref 8–23)
CALCIUM SERPL-MCNC: 9.1 MG/DL (ref 8.7–10.5)
CHLORIDE SERPL-SCNC: 96 MMOL/L (ref 95–110)
CO2 SERPL-SCNC: 26 MMOL/L (ref 23–29)
CREAT SERPL-MCNC: 0.9 MG/DL (ref 0.5–1.4)
DIFFERENTIAL METHOD: ABNORMAL
DIFFERENTIAL METHOD: ABNORMAL
EOSINOPHIL # BLD AUTO: 0.1 K/UL (ref 0–0.5)
EOSINOPHIL # BLD AUTO: 0.1 K/UL (ref 0–0.5)
EOSINOPHIL NFR BLD: 0.5 % (ref 0–8)
EOSINOPHIL NFR BLD: 1 % (ref 0–8)
ERYTHROCYTE [DISTWIDTH] IN BLOOD BY AUTOMATED COUNT: 15.6 % (ref 11.5–14.5)
ERYTHROCYTE [DISTWIDTH] IN BLOOD BY AUTOMATED COUNT: 15.8 % (ref 11.5–14.5)
EST. GFR  (AFRICAN AMERICAN): >60 ML/MIN/1.73 M^2
EST. GFR  (NON AFRICAN AMERICAN): >60 ML/MIN/1.73 M^2
FOLATE SERPL-MCNC: 12 NG/ML (ref 4–24)
GLUCOSE SERPL-MCNC: 109 MG/DL (ref 70–110)
HCT VFR BLD AUTO: 25.2 % (ref 37–48.5)
HCT VFR BLD AUTO: 27.9 % (ref 37–48.5)
HGB BLD-MCNC: 8 G/DL (ref 12–16)
HGB BLD-MCNC: 9.1 G/DL (ref 12–16)
IMM GRANULOCYTES # BLD AUTO: 0.07 K/UL (ref 0–0.04)
IMM GRANULOCYTES # BLD AUTO: 0.11 K/UL (ref 0–0.04)
IRON SERPL-MCNC: 15 UG/DL (ref 30–160)
LACTATE SERPL-SCNC: 0.7 MMOL/L (ref 0.5–2.2)
LACTATE SERPL-SCNC: 0.8 MMOL/L (ref 0.5–2.2)
LYMPHOCYTES # BLD AUTO: 0.9 K/UL (ref 1–4.8)
LYMPHOCYTES # BLD AUTO: 1.4 K/UL (ref 1–4.8)
LYMPHOCYTES NFR BLD: 13.7 % (ref 18–48)
LYMPHOCYTES NFR BLD: 7.8 % (ref 18–48)
MCH RBC QN AUTO: 31.4 PG (ref 27–31)
MCH RBC QN AUTO: 31.5 PG (ref 27–31)
MCHC RBC AUTO-ENTMCNC: 31.7 G/DL (ref 32–36)
MCHC RBC AUTO-ENTMCNC: 32.6 G/DL (ref 32–36)
MCV RBC AUTO: 97 FL (ref 82–98)
MCV RBC AUTO: 99 FL (ref 82–98)
MONOCYTES # BLD AUTO: 0.9 K/UL (ref 0.3–1)
MONOCYTES # BLD AUTO: 1 K/UL (ref 0.3–1)
MONOCYTES NFR BLD: 8.1 % (ref 4–15)
MONOCYTES NFR BLD: 8.7 % (ref 4–15)
NEUTROPHILS # BLD AUTO: 7.6 K/UL (ref 1.8–7.7)
NEUTROPHILS # BLD AUTO: 9.7 K/UL (ref 1.8–7.7)
NEUTROPHILS NFR BLD: 75.3 % (ref 38–73)
NEUTROPHILS NFR BLD: 82.7 % (ref 38–73)
NRBC BLD-RTO: 0 /100 WBC
NRBC BLD-RTO: 0 /100 WBC
PLATELET # BLD AUTO: 405 K/UL (ref 150–350)
PLATELET # BLD AUTO: 434 K/UL (ref 150–350)
PMV BLD AUTO: 8.5 FL (ref 9.2–12.9)
PMV BLD AUTO: 8.5 FL (ref 9.2–12.9)
POTASSIUM SERPL-SCNC: 3.5 MMOL/L (ref 3.5–5.1)
RBC # BLD AUTO: 2.55 M/UL (ref 4–5.4)
RBC # BLD AUTO: 2.89 M/UL (ref 4–5.4)
SATURATED IRON: 4 % (ref 20–50)
SODIUM SERPL-SCNC: 132 MMOL/L (ref 136–145)
TOTAL IRON BINDING CAPACITY: 339 UG/DL (ref 250–450)
TRANSFERRIN SERPL-MCNC: 229 MG/DL (ref 200–375)
TSH SERPL DL<=0.005 MIU/L-ACNC: 0.57 UIU/ML (ref 0.4–4)
VIT B12 SERPL-MCNC: >2000 PG/ML (ref 210–950)
WBC # BLD AUTO: 10.14 K/UL (ref 3.9–12.7)
WBC # BLD AUTO: 11.73 K/UL (ref 3.9–12.7)

## 2019-08-30 PROCEDURE — 36000704 HC OR TIME LEV I 1ST 15 MIN: Performed by: ORTHOPAEDIC SURGERY

## 2019-08-30 PROCEDURE — 82607 VITAMIN B-12: CPT

## 2019-08-30 PROCEDURE — 87075 CULTR BACTERIA EXCEPT BLOOD: CPT

## 2019-08-30 PROCEDURE — C1751 CATH, INF, PER/CENT/MIDLINE: HCPCS

## 2019-08-30 PROCEDURE — 87077 CULTURE AEROBIC IDENTIFY: CPT

## 2019-08-30 PROCEDURE — 36000705 HC OR TIME LEV I EA ADD 15 MIN: Performed by: ORTHOPAEDIC SURGERY

## 2019-08-30 PROCEDURE — 25000003 PHARM REV CODE 250: Performed by: NURSE ANESTHETIST, CERTIFIED REGISTERED

## 2019-08-30 PROCEDURE — 63600175 PHARM REV CODE 636 W HCPCS: Performed by: INTERNAL MEDICINE

## 2019-08-30 PROCEDURE — 37000009 HC ANESTHESIA EA ADD 15 MINS: Performed by: ORTHOPAEDIC SURGERY

## 2019-08-30 PROCEDURE — 83540 ASSAY OF IRON: CPT

## 2019-08-30 PROCEDURE — A4216 STERILE WATER/SALINE, 10 ML: HCPCS | Performed by: INTERNAL MEDICINE

## 2019-08-30 PROCEDURE — 63600175 PHARM REV CODE 636 W HCPCS: Performed by: NURSE ANESTHETIST, CERTIFIED REGISTERED

## 2019-08-30 PROCEDURE — C1729 CATH, DRAINAGE: HCPCS | Performed by: ORTHOPAEDIC SURGERY

## 2019-08-30 PROCEDURE — 71000039 HC RECOVERY, EACH ADD'L HOUR: Performed by: ORTHOPAEDIC SURGERY

## 2019-08-30 PROCEDURE — 25000003 PHARM REV CODE 250: Performed by: ORTHOPAEDIC SURGERY

## 2019-08-30 PROCEDURE — 85025 COMPLETE CBC W/AUTO DIFF WBC: CPT | Mod: 91

## 2019-08-30 PROCEDURE — 82746 ASSAY OF FOLIC ACID SERUM: CPT

## 2019-08-30 PROCEDURE — 20000000 HC ICU ROOM

## 2019-08-30 PROCEDURE — 37000008 HC ANESTHESIA 1ST 15 MINUTES: Performed by: ORTHOPAEDIC SURGERY

## 2019-08-30 PROCEDURE — D9220A PRA ANESTHESIA: Mod: CRNA,,, | Performed by: NURSE ANESTHETIST, CERTIFIED REGISTERED

## 2019-08-30 PROCEDURE — 80048 BASIC METABOLIC PNL TOTAL CA: CPT

## 2019-08-30 PROCEDURE — 27301 PR INCIS/DRAIN THIGH/KNEE ABSCESS,DEEP: ICD-10-PCS | Mod: 78,RT,, | Performed by: ORTHOPAEDIC SURGERY

## 2019-08-30 PROCEDURE — 84443 ASSAY THYROID STIM HORMONE: CPT

## 2019-08-30 PROCEDURE — 63600175 PHARM REV CODE 636 W HCPCS: Performed by: NURSE PRACTITIONER

## 2019-08-30 PROCEDURE — 36415 COLL VENOUS BLD VENIPUNCTURE: CPT

## 2019-08-30 PROCEDURE — 87205 SMEAR GRAM STAIN: CPT

## 2019-08-30 PROCEDURE — 27301 DRAIN THIGH/KNEE LESION: CPT | Mod: 78,RT,, | Performed by: ORTHOPAEDIC SURGERY

## 2019-08-30 PROCEDURE — D9220A PRA ANESTHESIA: ICD-10-PCS | Mod: CRNA,,, | Performed by: NURSE ANESTHETIST, CERTIFIED REGISTERED

## 2019-08-30 PROCEDURE — 80048 BASIC METABOLIC PNL TOTAL CA: CPT | Mod: 91

## 2019-08-30 PROCEDURE — 99900035 HC TECH TIME PER 15 MIN (STAT)

## 2019-08-30 PROCEDURE — 63600175 PHARM REV CODE 636 W HCPCS: Performed by: ANESTHESIOLOGY

## 2019-08-30 PROCEDURE — 83605 ASSAY OF LACTIC ACID: CPT | Mod: 91

## 2019-08-30 PROCEDURE — 87070 CULTURE OTHR SPECIMN AEROBIC: CPT | Mod: 59

## 2019-08-30 PROCEDURE — 99900103 DSU ONLY-NO CHARGE-INITIAL HR (STAT): Performed by: ORTHOPAEDIC SURGERY

## 2019-08-30 PROCEDURE — 36573 INSJ PICC RS&I 5 YR+: CPT

## 2019-08-30 PROCEDURE — D9220A PRA ANESTHESIA: ICD-10-PCS | Mod: ANES,,, | Performed by: ANESTHESIOLOGY

## 2019-08-30 PROCEDURE — 87186 SC STD MICRODIL/AGAR DIL: CPT | Mod: 59

## 2019-08-30 PROCEDURE — 25000003 PHARM REV CODE 250: Performed by: INTERNAL MEDICINE

## 2019-08-30 PROCEDURE — 87070 CULTURE OTHR SPECIMN AEROBIC: CPT

## 2019-08-30 PROCEDURE — 71000033 HC RECOVERY, INTIAL HOUR: Performed by: ORTHOPAEDIC SURGERY

## 2019-08-30 PROCEDURE — 83605 ASSAY OF LACTIC ACID: CPT

## 2019-08-30 PROCEDURE — D9220A PRA ANESTHESIA: Mod: ANES,,, | Performed by: ANESTHESIOLOGY

## 2019-08-30 RX ORDER — HYDROCODONE BITARTRATE AND ACETAMINOPHEN 10; 325 MG/1; MG/1
1 TABLET ORAL EVERY 4 HOURS PRN
Status: DISCONTINUED | OUTPATIENT
Start: 2019-08-30 | End: 2019-09-05 | Stop reason: HOSPADM

## 2019-08-30 RX ORDER — EPHEDRINE SULFATE 50 MG/ML
INJECTION, SOLUTION INTRAVENOUS
Status: DISCONTINUED | OUTPATIENT
Start: 2019-08-30 | End: 2019-08-30

## 2019-08-30 RX ORDER — SODIUM CHLORIDE 0.9 % (FLUSH) 0.9 %
10 SYRINGE (ML) INJECTION
Status: DISCONTINUED | OUTPATIENT
Start: 2019-08-30 | End: 2019-09-05 | Stop reason: HOSPADM

## 2019-08-30 RX ORDER — FENTANYL CITRATE 50 UG/ML
25 INJECTION, SOLUTION INTRAMUSCULAR; INTRAVENOUS EVERY 5 MIN PRN
Status: COMPLETED | OUTPATIENT
Start: 2019-08-30 | End: 2019-08-30

## 2019-08-30 RX ORDER — HYDROCODONE BITARTRATE AND ACETAMINOPHEN 5; 325 MG/1; MG/1
1 TABLET ORAL EVERY 4 HOURS PRN
Status: DISCONTINUED | OUTPATIENT
Start: 2019-08-30 | End: 2019-09-01

## 2019-08-30 RX ORDER — HYDROMORPHONE HYDROCHLORIDE 2 MG/ML
0.4 INJECTION, SOLUTION INTRAMUSCULAR; INTRAVENOUS; SUBCUTANEOUS EVERY 4 HOURS PRN
Status: DISCONTINUED | OUTPATIENT
Start: 2019-08-30 | End: 2019-08-31

## 2019-08-30 RX ORDER — PROPOFOL 10 MG/ML
VIAL (ML) INTRAVENOUS
Status: DISCONTINUED | OUTPATIENT
Start: 2019-08-30 | End: 2019-08-30

## 2019-08-30 RX ORDER — SODIUM CHLORIDE 9 MG/ML
INJECTION, SOLUTION INTRAVENOUS CONTINUOUS
Status: DISCONTINUED | OUTPATIENT
Start: 2019-08-30 | End: 2019-09-01

## 2019-08-30 RX ORDER — PHENYLEPHRINE HYDROCHLORIDE 10 MG/ML
INJECTION INTRAVENOUS
Status: DISCONTINUED | OUTPATIENT
Start: 2019-08-30 | End: 2019-08-30

## 2019-08-30 RX ORDER — MIDAZOLAM HYDROCHLORIDE 1 MG/ML
INJECTION, SOLUTION INTRAMUSCULAR; INTRAVENOUS
Status: DISCONTINUED | OUTPATIENT
Start: 2019-08-30 | End: 2019-08-30

## 2019-08-30 RX ORDER — ONDANSETRON 2 MG/ML
INJECTION INTRAMUSCULAR; INTRAVENOUS
Status: DISCONTINUED | OUTPATIENT
Start: 2019-08-30 | End: 2019-08-30

## 2019-08-30 RX ORDER — NOREPINEPHRINE BITARTRATE 0.03 MG/ML
0.02 INJECTION, SOLUTION INTRAVENOUS CONTINUOUS
Status: DISCONTINUED | OUTPATIENT
Start: 2019-08-31 | End: 2019-09-05

## 2019-08-30 RX ORDER — IBUPROFEN 400 MG/1
800 TABLET ORAL 3 TIMES DAILY
Status: DISCONTINUED | OUTPATIENT
Start: 2019-08-30 | End: 2019-08-31

## 2019-08-30 RX ORDER — FENTANYL CITRATE 50 UG/ML
INJECTION, SOLUTION INTRAMUSCULAR; INTRAVENOUS
Status: DISCONTINUED | OUTPATIENT
Start: 2019-08-30 | End: 2019-08-30

## 2019-08-30 RX ORDER — SODIUM CHLORIDE, SODIUM LACTATE, POTASSIUM CHLORIDE, CALCIUM CHLORIDE 600; 310; 30; 20 MG/100ML; MG/100ML; MG/100ML; MG/100ML
INJECTION, SOLUTION INTRAVENOUS CONTINUOUS PRN
Status: DISCONTINUED | OUTPATIENT
Start: 2019-08-30 | End: 2019-08-30

## 2019-08-30 RX ORDER — ETOMIDATE 2 MG/ML
INJECTION INTRAVENOUS
Status: DISCONTINUED | OUTPATIENT
Start: 2019-08-30 | End: 2019-08-30

## 2019-08-30 RX ORDER — LIDOCAINE HCL/PF 100 MG/5ML
SYRINGE (ML) INTRAVENOUS
Status: DISCONTINUED | OUTPATIENT
Start: 2019-08-30 | End: 2019-08-30

## 2019-08-30 RX ORDER — SODIUM CHLORIDE 0.9 % (FLUSH) 0.9 %
10 SYRINGE (ML) INJECTION EVERY 6 HOURS
Status: DISCONTINUED | OUTPATIENT
Start: 2019-08-30 | End: 2019-09-05 | Stop reason: HOSPADM

## 2019-08-30 RX ADMIN — OXYCODONE AND ACETAMINOPHEN 1 TABLET: 10; 325 TABLET ORAL at 05:08

## 2019-08-30 RX ADMIN — LOVASTATIN 20 MG: 20 TABLET ORAL at 08:08

## 2019-08-30 RX ADMIN — MIDAZOLAM 2 MG: 1 INJECTION INTRAMUSCULAR; INTRAVENOUS at 01:08

## 2019-08-30 RX ADMIN — FENTANYL CITRATE 50 MCG: 50 INJECTION, SOLUTION INTRAMUSCULAR; INTRAVENOUS at 01:08

## 2019-08-30 RX ADMIN — EPHEDRINE SULFATE 15 MG: 50 INJECTION, SOLUTION INTRAMUSCULAR; INTRAVENOUS; SUBCUTANEOUS at 02:08

## 2019-08-30 RX ADMIN — SODIUM CHLORIDE, SODIUM LACTATE, POTASSIUM CHLORIDE, AND CALCIUM CHLORIDE: .6; .31; .03; .02 INJECTION, SOLUTION INTRAVENOUS at 01:08

## 2019-08-30 RX ADMIN — EPHEDRINE SULFATE 10 MG: 50 INJECTION, SOLUTION INTRAMUSCULAR; INTRAVENOUS; SUBCUTANEOUS at 01:08

## 2019-08-30 RX ADMIN — OXYCODONE AND ACETAMINOPHEN 1 TABLET: 10; 325 TABLET ORAL at 11:08

## 2019-08-30 RX ADMIN — SODIUM CHLORIDE: 0.9 INJECTION, SOLUTION INTRAVENOUS at 08:08

## 2019-08-30 RX ADMIN — FLUTICASONE PROPIONATE 50 MCG: 50 SPRAY, METERED NASAL at 08:08

## 2019-08-30 RX ADMIN — FENTANYL CITRATE 25 MCG: 50 INJECTION, SOLUTION INTRAMUSCULAR; INTRAVENOUS at 01:08

## 2019-08-30 RX ADMIN — SODIUM CHLORIDE 1000 ML: 0.9 INJECTION, SOLUTION INTRAVENOUS at 08:08

## 2019-08-30 RX ADMIN — EPHEDRINE SULFATE 10 MG: 50 INJECTION, SOLUTION INTRAMUSCULAR; INTRAVENOUS; SUBCUTANEOUS at 02:08

## 2019-08-30 RX ADMIN — FLUOXETINE 40 MG: 20 CAPSULE ORAL at 08:08

## 2019-08-30 RX ADMIN — FERROUS SULFATE TAB EC 325 MG (65 MG FE EQUIVALENT) 325 MG: 325 (65 FE) TABLET DELAYED RESPONSE at 08:08

## 2019-08-30 RX ADMIN — VANCOMYCIN HYDROCHLORIDE 1250 MG: 1.25 INJECTION, POWDER, LYOPHILIZED, FOR SOLUTION INTRAVENOUS at 06:08

## 2019-08-30 RX ADMIN — IBUPROFEN 800 MG: 400 TABLET ORAL at 08:08

## 2019-08-30 RX ADMIN — Medication 10 ML: at 08:08

## 2019-08-30 RX ADMIN — TRAZODONE HYDROCHLORIDE 50 MG: 50 TABLET ORAL at 10:08

## 2019-08-30 RX ADMIN — PIPERACILLIN AND TAZOBACTAM 4.5 G: 4; .5 INJECTION, POWDER, LYOPHILIZED, FOR SOLUTION INTRAVENOUS; PARENTERAL at 05:08

## 2019-08-30 RX ADMIN — SODIUM CHLORIDE: 0.9 INJECTION, SOLUTION INTRAVENOUS at 05:08

## 2019-08-30 RX ADMIN — PIPERACILLIN AND TAZOBACTAM 4.5 G: 4; .5 INJECTION, POWDER, LYOPHILIZED, FOR SOLUTION INTRAVENOUS; PARENTERAL at 12:08

## 2019-08-30 RX ADMIN — VANCOMYCIN HYDROCHLORIDE 1250 MG: 1.25 INJECTION, POWDER, LYOPHILIZED, FOR SOLUTION INTRAVENOUS at 05:08

## 2019-08-30 RX ADMIN — PROMETHAZINE HYDROCHLORIDE 6.25 MG: 25 INJECTION INTRAMUSCULAR; INTRAVENOUS at 03:08

## 2019-08-30 RX ADMIN — PHENYLEPHRINE HYDROCHLORIDE 100 MCG: 10 INJECTION INTRAVENOUS at 01:08

## 2019-08-30 RX ADMIN — NOREPINEPHRINE BITARTRATE 0.02 MCG/KG/MIN: 8 SOLUTION at 11:08

## 2019-08-30 RX ADMIN — PIPERACILLIN AND TAZOBACTAM 4.5 G: 4; .5 INJECTION, POWDER, LYOPHILIZED, FOR SOLUTION INTRAVENOUS; PARENTERAL at 08:08

## 2019-08-30 RX ADMIN — FENTANYL CITRATE 25 MCG: 0.05 INJECTION, SOLUTION INTRAMUSCULAR; INTRAVENOUS at 03:08

## 2019-08-30 RX ADMIN — PANTOPRAZOLE SODIUM 40 MG: 40 TABLET, DELAYED RELEASE ORAL at 08:08

## 2019-08-30 RX ADMIN — Medication 10 ML: at 05:08

## 2019-08-30 RX ADMIN — LIDOCAINE HYDROCHLORIDE 100 MG: 20 INJECTION, SOLUTION INTRAVENOUS at 01:08

## 2019-08-30 RX ADMIN — SODIUM CHLORIDE 500 ML: 0.9 INJECTION, SOLUTION INTRAVENOUS at 11:08

## 2019-08-30 RX ADMIN — ETOMIDATE 20 MG: 2 INJECTION, SOLUTION INTRAVENOUS at 01:08

## 2019-08-30 RX ADMIN — ONDANSETRON 4 MG: 2 INJECTION, SOLUTION INTRAMUSCULAR; INTRAVENOUS at 01:08

## 2019-08-30 RX ADMIN — PROPOFOL 50 MG: 10 INJECTION, EMULSION INTRAVENOUS at 01:08

## 2019-08-30 RX ADMIN — ASPIRIN 81 MG: 81 TABLET, COATED ORAL at 08:08

## 2019-08-30 RX ADMIN — FENTANYL CITRATE 25 MCG: 50 INJECTION, SOLUTION INTRAMUSCULAR; INTRAVENOUS at 02:08

## 2019-08-30 RX ADMIN — SODIUM CHLORIDE 500 ML: 0.9 INJECTION, SOLUTION INTRAVENOUS at 08:08

## 2019-08-30 NOTE — ANESTHESIA PREPROCEDURE EVALUATION
08/30/2019  Iris Mueller is a 63 y.o., female.    Pre-op Assessment    I have reviewed the Patient Summary Reports.     I have reviewed the Nursing Notes.   I have reviewed the Medications.     Review of Systems  Anesthesia Hx:  Denies Family Hx of Anesthesia complications.   Denies Personal Hx of Anesthesia complications.   Social:  Smoker    Cardiovascular:   Hypertension ECG has been reviewed. Systolic heart murmur heard over the Aortic area   Pulmonary:   COPD, moderate Asthma moderate    Musculoskeletal:   Arthritis   Spine Disorders: lumbar Degenerative disease and Chronic Pain        Physical Exam  General:  Morbid Obesity, Malnutrition    Airway/Jaw/Neck:  Airway Findings: Mouth Opening: Small, but > 3cm Tongue: Normal  General Airway Assessment: Adult  Mallampati: III  Improves to II with phonation.  TM Distance: 4 - 6 cm  Jaw/Neck Findings:  Neck ROM: Extension Decreased, Mild      Dental:  Dental Findings: Upper Dentures, Lower Dentures   Chest/Lungs:  Chest/Lungs Findings: Decreased Breath Sounds Bilateral, Clear to auscultation     Heart/Vascular:  Heart Findings: Rate: Normal  Rhythm: Regular Rhythm  Sounds: Quiet  Heart Murmur  Systolic  Systolic Heart Murmur Description: R Upper Sternal Border, Radiates to Carotids  Systolic Heart Murmur Grade: Grade III             Anesthesia Plan  Type of Anesthesia, risks & benefits discussed:  Anesthesia Type:  general  Patient's Preference:   Intra-op Monitoring Plan: standard ASA monitors  Intra-op Monitoring Plan Comments:   Post Op Pain Control Plan:   Post Op Pain Control Plan Comments:   Induction:    Beta Blocker:  Patient is not currently on a Beta-Blocker (No further documentation required).       Informed Consent: Patient understands risks and agrees with Anesthesia plan.  Questions answered. Anesthesia consent signed with  patient.  ASA Score: 3     Day of Surgery Review of History & Physical:    H&P update referred to the surgeon.         Ready For Surgery From Anesthesia Perspective.

## 2019-08-30 NOTE — ED NOTES
Pt in room 5 for evaluation of redness, swelling, pain and foul odor to incision site of right knee surgery 2 weeks ago.  Pt is awake, alert and oriented. Resp even and unlabored. Nicholas breath sounds clear.  Pt denies chest pain or sob.  Abd soft and non-tender.  Pt has redness, swelling, bruising and pain to right knee. Lower part of incision site is open with drainage to tape over wound applied per Dr. Chung. Family at bedside.

## 2019-08-30 NOTE — BRIEF OP NOTE
[facility]  Brief Operative Note    SUMMARY     Surgery Date: 8/30/2019     Surgeon(s) and Role:     * Delio Chung MD - Primary    Assisting Surgeon: None    Pre-op Diagnosis:  Infected incision [T81.49XA]    Post-op Diagnosis:  Post-Op Diagnosis Codes:     * Infected incision [T81.49XA]    Procedure(s) (LRB):  INCISION AND DRAINAGE, LOWER EXTREMITY (Right)    Anesthesia: General    Description of Procedure: incision and debridement right knee wound    Description of the findings of the procedure: See Dictation     Estimated Blood Loss: 25 mL         Specimens:   Specimen (12h ago, onward)    None      Dictation #1  MRN:[id number]  [facility]:[id number]  [id number]

## 2019-08-30 NOTE — NURSING
Dr Jules notified of bp 77/50 map 59 asymtomatic  Order obtained for 500ml NS bolus with NS @ 100ml/hr following and lactic acid draw.

## 2019-08-30 NOTE — CONSULTS
"Consult Note  Infectious Disease    Reason for Consult:      HPI: History of Present Illness:  Patient is a 63 y.o. female with past medical history of HTN, asthma, right TKA on 08/14/2019 by Dr. Chung   presented to ED on 08/29/2019 with complaints of bloody and foul-smelling drainage  from right knee incision.       Patient saw the surgeon who referred her to PCP for leg edema, in order to receive lasix. PCP saw her on 08/26/2019 and  Expressed a hematoma was expressed out of the right knee incision manually." Rt knee  felt so much better afterwardst"  Patient was started on oral clindamycin 300 mg 3 times daily.   She did not improve.  Quite the opposite so she came to ER   No systemic symptoms of infection.  Patient denied any associated fever,(she felt warm) No chills, No recent trauma to knee joint.     Patient is started on vancomycin and Zosyn.  I came to see her twice yesterday but the 1st time patient was getting a PICC line and the 2nd time she was in OR.    Overnight she was hypotensive and received 1 L of NS, she was moved to ICU, started on Levophed  She could not receive opiates bc of low blood pressure and states that she cried herself to sleep  She is comfortable at this time      Antibiotics (From admission, onward)    Start     Stop Route Frequency Ordered    08/30/19 0500  vancomycin 1.25 g in dextrose 5% 250 mL IVPB (ready to mix)      -- IV Every 12 hours (non-standard times) 08/29/19 2050    08/30/19 0100  piperacillin-tazobactam 4.5 g in dextrose 5 % 100 mL IVPB (ready to mix system)      -- IV Every 8 hours (non-standard times) 08/29/19 2006        Antifungals (From admission, onward)    None        Antivirals (From admission, onward)    None          Review of patient's allergies indicates:  No Known Allergies  Past Medical History:   Diagnosis Date    Asthma     Encounter for blood transfusion     Hypertension      Past Surgical History:   Procedure Laterality Date    ARTHROPLASTY, " KNEE Right 2019    Performed [facility]     SECTION      TONSILLECTOMY       Social History     Socioeconomic History    Marital status:      Spouse name: Not on file    Number of children: Not on file    Years of education: Not on file    Highest education level: Not on file   Occupational History    Not on file   Social Needs    Financial resource strain: Not on file    Food insecurity:     Worry: Not on file     Inability: Not on file    Transportation needs:     Medical: Not on file     Non-medical: Not on file   Tobacco Use    Smoking status: Current Every Day Smoker     Packs/day: 0.50     Years: 30.00     Pack years: 15.00     Types: Cigarettes    Smokeless tobacco: Never Used   Substance and Sexual Activity    Alcohol use: Yes     Comment: RARELY    Drug use: Never    Sexual activity: Not on file   Lifestyle    Physical activity:     Days per week: Not on file     Minutes per session: Not on file    Stress: Not on file   Relationships    Social connections:     Talks on phone: Not on file     Gets together: Not on file     Attends Hinduism service: Not on file     Active member of club or organization: Not on file     Attends meetings of clubs or organizations: Not on file     Relationship status: Not on file   Other Topics Concern    Not on file   Social History Narrative    Not on file     Family History   Problem Relation Age of Onset    Alzheimer's disease Mother        Pertinent medications noted:     Review of Systems:     Felt warm and cold  No change in vision, loss of vision or diplopia  No sinus congestion, purulent nasal discharge, post nasal drip or facial pain  No pain in mouth or throat. No problems with teeth, gums.  No chest pain, palpitations, syncope  Baseline smokers cough, No phlegm, no  shortness of breath, No dyspnea on exertion, pleurisy, hemoptysis  No nausea, vomiting, diarrhea, constipation, blood in stool, or focal  abd pain,  No dysphagia, odynophagia  No dysuria, hematuria, strangury, retention, incontinence, nocturia, prostatism,   No vaginal discharge, genital ulcers, risk for STD  Right knee pain, post op  No unusual headaches, dizziness, vertigo, numbness, paresthesias, neuropathy, falls  No anxiety, depression, substance abuse, sleep disturbance  No diabetes, thyroid, hypogonadal conditions  No bleeding, lymphadenopathy, anemia, malignancy, unusual bruising  No new rashes, lesions, or wounds  No TB exposure  No recent/prior steroids-just intranasal and inhaled steroids  Outdoor activities: none  Travel: no  Implants: right knee 08/14/2019  Antibiotic History:     EXAM & DIAGNOSTICS REVIEWED:   Vitals:     Temp:  [98 °F (36.7 °C)-99.1 °F (37.3 °C)]   Temp: 98.3 °F (36.8 °C) (08/30/19 0746)  Pulse: 74 (08/30/19 0746)  Resp: 20 (08/30/19 0746)  BP: (!) 77/50 (08/30/19 0746)  SpO2: 96 % (08/30/19 0746)    Intake/Output Summary (Last 24 hours) at 8/30/2019 0942  Last data filed at 8/30/2019 0609  Gross per 24 hour   Intake 950 ml   Output --   Net 950 ml       General:  In NAD. Alert and attentive, cooperative, comfortable  Eyes:  Anicteric, PERRL, EOMI  ENT:  No ulcers, exudates, thrush, nares patent, dentition is good  Neck:  supple, no masses or adenopathy appreciated  Lungs: Clear, no consolidation, rales, wheezes, rub  Heart:  SM referred to carotids? Vs carotid bruit bl?  Abd:  Soft, NT, ND, normal BS, no masses or organomegaly appreciated.  :  Voids/[place], urine clear, no flank tenderness  Musc:  Joints without effusion, swelling, erythema, synovitis, muscle wasting.   Skin:  No rashes. No palmar or plantar lesions. No subungual petechiae  Wound:   Neuro: Alert, attentive, speech fluent, face symmetric, moves all extremities, no focal weakness. Ambulatory  Psych:  Calm, cooperative  Lymphatic:     No cervical, supraclavicular, axillary, or inguinal nodes  Extrem: No edema, erythema, phlebitis, cellulitis, warm and  well perfused  VAD:  Isolation:   This picture is from 08/23  I saw         Lines/Tubes/Drains:    General Labs reviewed:  Recent Labs   Lab 08/30/19  0458   WBC 10.14   RBC 2.89*   HGB 9.1*   HCT 27.9*   *   MCV 97   MCH 31.5*   MCHC 32.6     Recent Labs   Lab 08/29/19  1630 08/30/19  0458   CALCIUM 9.2 9.1   PROT 6.4  --    * 132*   K 3.9 3.5   CO2 26 26   CL 96 96   BUN 18 15   CREATININE 1.1 0.9   ALKPHOS 94  --    ALT 11  --    AST 11  --    BILITOT 0.7  --        Micro:  Microbiology Results (last 7 days)     Procedure Component Value Units Date/Time    Aerobic culture [[id number]]  (Abnormal) Collected:  08/29/19 1634    Order Status:  Completed Specimen:  Incision site from Leg, Right Updated:  08/31/19 1328     Aerobic Bacterial Culture PROTEUS MIRABILIS  Many  Susceptibility pending        KLEBSIELLA PNEUMONIAE  Moderate  Susceptibility pending  No other significant isolate      Tissue culture [[id number]] Collected:  08/30/19 1521    Order Status:  Completed Specimen:  Wound from Knee, Right Updated:  08/31/19 0057     Gram Stain Result Rare WBC's      No organisms seen    Culture, Anaerobe [[id number]] Collected:  08/30/19 1521    Order Status:  Sent Specimen:  Wound from Knee, Right Updated:  08/30/19 2317    Aerobic culture [[id number]] Collected:  08/30/19 1521    Order Status:  Sent Specimen:  Wound from Knee, Right Updated:  08/30/19 2317          Imaging Reviewed:   Rt Knee XR   Right knee arthroplasty without immediate complication.   US Rt 08/29/2019  No evidence of deep venous thrombosis in the right lower extremity.   US BL 08/23/2019 No evidence of deep venous thrombosis in either lower extremity.         Antibiotics (From admission, onward)    Start     Stop Route Frequency Ordered    08/30/19 0500  vancomycin 1.25 g in dextrose 5% 250 mL IVPB (ready to mix)      -- IV Every 12 hours (non-standard times) 08/29/19 2050 08/30/19 0100  piperacillin-tazobactam 4.5 g in dextrose 5 %  "100 mL IVPB (ready to mix system)      -- IV Every 8 hours (non-standard times) 08/29/19 2006        Antifungals (From admission, onward)    None        Antivirals (From admission, onward)    None        Imaging reviewed.  CXR  No acute findings.  Right PICC line in place  Knee xr   There is right TKA with the orthopedic hardware appearing in place and intact.    IMPRESSION & PLAN      Surgical wound infection.  Hopefully no knee involvement.   As per surgical report:  " At no point   did we find access to the knee or see any drainage from the knee.  We took cultures of the tissue and then began copiously irrigating the wound"   Septic shock postop   Leukocytosis improving        Expect slow healing bc of multiple comorbidities   Ho COPD, Obesity, HTN, FM, anemia   -  Son asks me if PCP should have not touched the knee and expressed the blood, because "only the surgeon should have done that. We are not trying to jodee or anything, but did the PCP do something wrong?"  I explained that PCP did not cause the hematoma/infection;  he just diagnosed it. I also explained that risk facors for post op infection are  older age (more than 65) , obesity, HTN, tabacco use, chronic pulmonary disease,  preoperative anemia ( hb 11), long procedures. I also discussed with patient and son that I am hoping this is not a knee infection, I explained the complexity of treating prosthetic infection. I extensively dw her quitng smoking. Son feels she will not be able to quit and she will go back to smoking a pack per day.   -   SM referred to carotids vs carotid bruit. I educated and asked patient to follow with PCP re carotid US    Recommendations:    Follow cultures (espetially intraop cx)   and change  abx accordingly   dc vanc    "

## 2019-08-30 NOTE — PROGRESS NOTES
Progress Note  Hospital Medicine  Patient Name:Iris Mueller  MRN:  [id number]  Patient Class: IP- Inpatient  Admit Date: 8/29/2019  Length of Stay: 1 days  Expected Discharge Date:   Attending Physician: Yazmin Jules MD  Primary Care Provider:  Elkin You MD    SUBJECTIVE:     Principal Problem: Severe sepsis  Initial history of present illness: Patient is a 63 y.o. female admitted to Hospitalist Service from [facility] Emergency Room with complaint of drainage from right knee incision. Patient reportedly has past medical history significant for hypertension and bronchial asthma. Patient underwent right total knee arthroplasty performed by Dr. Chung on August 14, 2019.  Patient presented to the emergency room with complaint of bloody drainage which is foul smelling from right knee incision.  Patient reports she was seen by primary care physician on August 26, 2019 when a hematoma was expressed out of the right knee incision manually.  Patient was started on oral clindamycin 300 mg 3 times daily.  Patient denied any associated fever, chills or any recent trauma to knee joint. Patient denied chest pain, shortness of breath, abdominal pain, nausea, vomiting, headache, vision changes, focal neuro-deficits, cough or fever.    PMH/PSH/SH/FH/Meds: reviewed.    Symptoms/Review of Systems:  Patient is hypotensive with blood pressure around 77/50 mm of mercury.  T-max 99.1°.  Lactate level within normal.  Patient is without any new subjective complaints.  No shortness of breath, cough, chest pain or headache, fever or abdominal pain.     Diet:  Adequate intake.    Activity level:  Up with assist    Pain:  Patient reports no pain.       OBJECTIVE:   Vital Signs (Most Recent):      Temp: 98.3 °F (36.8 °C) (08/30/19 0746)  Pulse: 74 (08/30/19 0746)  Resp: 20 (08/30/19 0746)  BP: (!) 77/50 (08/30/19 0746)  SpO2: 96 % (08/30/19 0746)       Vital Signs Range (Last 24H):  Temp:  [98 °F (36.7 °C)-99.1 °F  (37.3 °C)]   Pulse:  [73-84]   Resp:  [16-20]   BP: ()/(50-80)   SpO2:  [93 %-100 %]     Weight: 128.3 kg (282 lb 13.6 oz)  Body mass index is 51.73 kg/m².    Intake/Output Summary (Last 24 hours) at 8/30/2019 0935  Last data filed at 8/30/2019 0609  Gross per 24 hour   Intake 950 ml   Output --   Net 950 ml     Physical Examination:  General appearance: well developed, appears stated age  Head: normocephalic, atraumatic  Eyes:  conjunctivae/corneas clear. PERRL.  Nose: Nares normal. Septum midline.  Throat: lips, mucosa, and tongue normal; teeth and gums normal, no throat erythema.  Neck: supple, symmetrical, trachea midline, no JVD and thyroid not enlarged, symmetric, no tenderness/mass/nodules  Lungs:  clear to auscultation bilaterally and normal respiratory effort  Chest wall: no tenderness  Heart: regular rate and rhythm, S1, S2 normal, no murmur, click, rub or gallop  Abdomen: soft, non-tender non-distended; bowel sounds normal; no masses,  no organomegaly  Extremities: no cyanosis, clubbing or edema. Erythema of right knee extending up to the medial malleolus and heel, wound dehiscence in the center part of vertical knee incision with residual possible fluctuant hematoma medially, foul smelling discharge which is serosanguineous.  2+ pitting edema and right calf firmness noted. Range of motion is not painful.  Pulses: 2+ and symmetric  Skin: Skin color, texture, turgor normal. No rashes or lesions.  Lymph nodes: Cervical, supraclavicular, and axillary nodes normal.  Neurologic: Normal strength and tone. No focal numbness or weakness. CNII-XII intact.      CBC:  Recent Labs   Lab 08/23/19  1328 08/29/19  1630 08/30/19  0458   WBC 11.87 13.12* 10.14   RBC 2.90* 2.97* 2.89*   HGB 9.1* 9.4* 9.1*   HCT 27.8* 28.7* 27.9*   * 437* 434*   MCV 96 97 97   MCH 31.4* 31.6* 31.5*   MCHC 32.7 32.8 32.6   BMP  Recent Labs   Lab 08/23/19  1328 08/29/19  1630 08/30/19  0458    96 109   * 130* 132*    K 3.7 3.9 3.5   CL 93* 96 96   CO2 23 26 26   BUN 21 18 15   CREATININE 1.2 1.1 0.9   CALCIUM 9.2 9.2 9.1      Diagnostic Results:  Microbiology Results (last 7 days)     Procedure Component Value Units Date/Time    Aerobic culture [[id number]] Collected:  08/29/19 1632    Order Status:  Sent Specimen:  Incision site from Leg, Right Updated:  08/29/19 2206         RLE venous doppler scan; No evidence of deep venous thrombosis in the right lower extremity.    Assessment/Plan:      Severe Sepsis with post-op right knee wound infection  Postop wound infection of right knee joint and right leg cellulitis  Osteoarthritis of right knee status post right total knee arthroplasty (August 14, 2019)  Follow Dr. Chung recommendations. Likely would require orthopedic intervention.  Lactic acid level within normal limits.  Give 500 cc normal saline IV fluid bolus and continue intravenous fluid hydration.  Follow microbiology-wound culture results.  Follow ID recommendations.  Continue intravenous vancomycin and Zosyn antibiotic therapy.  Pharmacy to dose vancomycin.  Pain control with oral narcotics.  Continue physical therapy.  Observe fall precautions.  RLE venous doppler scan tis negative for DVT.  Obtain right knee x-ray. Continue RLE elevation.      Yes    Essential hypertension [I10]  Chronic problem. Will continue chronic medications and monitor for any changes, adjusting as needed.      Yes    Fibromyalgia [M79.7]  As above.      Yes    Morbid obesity [E66.01]  Body mass index is 48.47 kg/m². Morbid obesity complicates all aspects of disease management from diagnostic modalities to treatment. Weight loss encouraged and health benefits explained to patient.   Yes        Anemia - NOS  Follow CBC and transfuse as needed.  Check iron, TIBC, B12 and folic acid.          DVT prophylaxis:  Use sequential compression stockings and Brennan hose to left leg.  Resume anticoagulation therapy in a.m. after surgical  intervention     Discussed with multiple family members, answered all questions.     VTE Risk Mitigation (From admission, onward)        Ordered     IP VTE HIGH RISK PATIENT  Once      08/29/19 2006     Place sequential compression device  Until discontinued      08/29/19 2006     Place KAROLINA hose  Until discontinued      08/29/19 2006        Yazmin Jules MD  Department of Hospital Medicine   [facility]

## 2019-08-30 NOTE — ANESTHESIA POSTPROCEDURE EVALUATION
Anesthesia Post Evaluation    Patient: Iris Mueller    Procedure(s) Performed: Procedure(s) (LRB):  INCISION AND DRAINAGE, LOWER EXTREMITY (Right)    Final Anesthesia Type: general  Patient location during evaluation: PACU  Patient participation: Yes- Able to Participate  Level of consciousness: awake and alert  Post-procedure vital signs: reviewed and stable  Pain management: adequate  Airway patency: patent  PONV status at discharge: No PONV  Anesthetic complications: no      Cardiovascular status: blood pressure returned to baseline  Respiratory status: unassisted  Hydration status: euvolemic  Follow-up not needed.          Vitals Value Taken Time   /58 8/30/2019  3:57 PM   Temp 36.7 °C (98.1 °F) 8/30/2019  3:13 PM   Pulse 76 8/30/2019  4:00 PM   Resp 15 8/30/2019  4:00 PM   SpO2 100 % 8/30/2019  4:00 PM   Vitals shown include unvalidated device data.      No case tracking events are documented in the log.      Pain/Ad Score: Pain Rating Prior to Med Admin: 10 (8/30/2019  3:38 PM)  Pain Rating Post Med Admin: 4 (8/30/2019  6:09 AM)  Ad Score: 9 (8/30/2019  3:45 PM)

## 2019-08-30 NOTE — PROCEDURES
"Iris Mueller is a 63 y.o. female patient.    Temp: 98 °F (36.7 °C) (08/30/19 1146)  Pulse: 74 (08/30/19 1146)  Resp: 18 (08/30/19 1146)  BP: (!) 103/59 (08/30/19 1146)  SpO2: 99 % (08/30/19 1146)  Weight: 128.3 kg (282 lb 13.6 oz) (08/29/19 2147)  Height: 5' 2" (157.5 cm) (08/29/19 2147)    PICC  Date/Time: 8/30/2019 1:00 PM  Location procedure was performed: [facility] MEDICAL SURGICAL UNIT 3RD  Performed by: Krystina Rivas RN  Consent Done: Yes  Time out: Immediately prior to procedure a time out was called to verify the correct patient, procedure, equipment, support staff and site/side marked as required  Indications: med administration and vascular access  Anesthesia: local infiltration  Local anesthetic: lidocaine 1% without epinephrine  Anesthetic Total (mL): 4  Preparation: skin prepped with ChloraPrep  Skin prep agent dried: skin prep agent completely dried prior to procedure  Sterile barriers: all five maximum sterile barriers used - cap, mask, sterile gown, sterile gloves, and large sterile sheet  Hand hygiene: hand hygiene performed prior to central venous catheter insertion  Location details: right basilic  Catheter type: double lumen  Catheter size: 5 Fr  Catheter Length: 38cm    Ultrasound guidance: yes  Vessel Caliber: large and patent, compressibility normal  Vascular Doppler: not done  Needle advanced into vessel with real time Ultrasound guidance.  Guidewire confirmed in vessel.  Image recorded and saved.  Sterile sheath used.  no esophageal manometryNumber of attempts: 1  Post-procedure: blood return through all ports, chlorhexidine patch and sterile dressing applied  Technical procedures used: JOYCE; Maria R 3CG  Significant surgical tasks conducted by the assistant(s): n/a  Estimated blood loss (mL): 0  Specimens: No  Implants: No  Assessment: placement verified by x-ray, free fluid flow, no pneumothorax on x-ray, tip termination and successful placement  Tip Termination Explanation: " SVC  Complications: none          Krystina Rivas  8/30/2019

## 2019-08-30 NOTE — PLAN OF CARE
Problem: Adult Inpatient Plan of Care  Goal: Plan of Care Review  Outcome: Revised  Pt is AAOx4.  Pt c/o pain, prn medication given, pt tolerated well.  IV saline locked, infusing abx as ordered, no redness or swelling at site.  RLE actively weeping sanguinous fluid, pulses barely palpable, neurovascular checks maintained q 4 hr.  Pt is up with assistance to BSC as needed.  VSS, in NAD, pt remains afebrile.  Pt remains free from injury.  Bed in low position, wheels locked, call light within reach.  Daughter remains at bedside.  Pt verbalized understanding of POC.  Hourly/ Q 2 hr rounding performed.  Will continue to monitor.

## 2019-08-30 NOTE — TRANSFER OF CARE
"Anesthesia Transfer of Care Note    Patient: Iris Mueller    Procedure(s) Performed: Procedure(s) (LRB):  INCISION AND DRAINAGE, LOWER EXTREMITY (Right)    Patient location: PACU    Anesthesia Type: general    Transport from OR: Transported from OR on 2-3 L/min O2 by NC with adequate spontaneous ventilation    Post pain: adequate analgesia    Post assessment: no apparent anesthetic complications and tolerated procedure well    Post vital signs: stable    Level of consciousness: awake and alert    Nausea/Vomiting: no nausea/vomiting    Complications: none    Transfer of care protocol was followed      Last vitals:   Visit Vitals  BP (!) 83/51 (BP Location: Left arm, Patient Position: Lying)   Pulse 72   Temp 36.5 °C (97.7 °F) (Skin)   Resp 18   Ht 5' 2" (1.575 m)   Wt 128.3 kg (282 lb 13.6 oz)   SpO2 100%   Breastfeeding? No   BMI 51.73 kg/m²     "

## 2019-08-30 NOTE — PLAN OF CARE
08/29/19 2110   Patient Assessment/Suction   Level of Consciousness (AVPU) alert   Respiratory Effort Normal;Unlabored   Rhythm/Pattern, Respiratory pattern regular   PRE-TX-O2   O2 Device (Oxygen Therapy) room air   SpO2 96 %   Pulse Oximetry Type Intermittent   $ Pulse Oximetry - Single Charge Pulse Oximetry - Single   Aerosol Therapy   $ Aerosol Therapy Charges PRN treatment not required

## 2019-08-30 NOTE — PLAN OF CARE
Infectious disease consult called in to Dr. Kim's office. Ortho consult called in to Dr. Chung' office.

## 2019-08-30 NOTE — PROGRESS NOTES
Infectious disease progress note.    I came to see patient.  PICC was being placed at that time.  Came to see patient later she was in OR for incision and drainage.  I will see her tomorrow  I reviewed chart.    Antibiotics (From admission, onward)    Start     Stop Route Frequency Ordered    08/30/19 0500  vancomycin 1.25 g in dextrose 5% 250 mL IVPB (ready to mix)      -- IV Every 12 hours (non-standard times) 08/29/19 2050 08/30/19 0100  piperacillin-tazobactam 4.5 g in dextrose 5 % 100 mL IVPB (ready to mix system)      -- IV Every 8 hours (non-standard times) 08/29/19 2006        Antifungals (From admission, onward)    None        Antivirals (From admission, onward)    None          Kayla Kim MD

## 2019-08-30 NOTE — NURSING
Attempted to call ane for order for breakthrough pain med, no answer, will f/u    1930 orders obtained from Dr Dean

## 2019-08-30 NOTE — CONSULTS
Pharmacokinetic Initial Assessment: IV Vancomycin    Assessment/Plan:    Initiate intravenous vancomycin with loading dose of 1.75 gm once followed by a maintenance dose of vancomycin 1250 mg IV every 12 hours.  Desired empiric serum trough concentration is 15 to 20 mcg/mL.  Draw vancomycin trough level 30 min prior to fourth dose on 08/31 at approximately 0430. Pharmacy will continue to follow and monitor vancomycin.      Please contact pharmacy at extension [phone] with any questions regarding this assessment.     Thank you for the consult,   José Velarde, PharmD       Patient brief summary:  Iris Mueller is a 63 y.o. female initiated on antimicrobial therapy with IV Vancomycin for treatment of suspected bone/joint infection (post op knee joint and leg cellulitis).    Drug Allergies:   Review of patient's allergies indicates:  No Known Allergies    Actual Body Weight:   120.2 kg    Renal Function:   Estimated Creatinine Clearance: 62.9 mL/min (based on SCr of 1.1 mg/dL).,       CBC (last 72 hours):  Recent Labs   Lab Result Units 08/29/19  1630   WBC K/uL 13.12*   Hemoglobin g/dL 9.4*   Hematocrit % 28.7*   Platelets K/uL 437*   Gran% % 79.0*   Lymph% % 11.7*   Mono% % 7.4   Eosinophil% % 0.6   Basophil% % 0.2   Differential Method  Automated       Metabolic Panel (last 72 hours):  Recent Labs   Lab Result Units 08/29/19  1630   Sodium mmol/L 130*   Potassium mmol/L 3.9   Chloride mmol/L 96   CO2 mmol/L 26   Glucose mg/dL 96   BUN, Bld mg/dL 18   Creatinine mg/dL 1.1   Albumin g/dL 2.9*   Total Bilirubin mg/dL 0.7   Alkaline Phosphatase U/L 94   AST U/L 11   ALT U/L 11       Drug levels (last 3 results):  No results for input(s): VANCOMYCINRA, VANCOMYCINPE, VANCOMYCINTR in the last 72 hours.    Microbiologic Results:  Microbiology Results (last 7 days)       Procedure Component Value Units Date/Time    Aerobic culture [[id number]] Collected:  08/29/19 1634    Order Status:  Sent Specimen:  Incision site  from Leg, Right Updated:  08/29/19 0845

## 2019-08-30 NOTE — PLAN OF CARE
Met with pt to complete her assessment.  Pt states that since her discharge from the hospital following her surgery on August 14th she has been staying with her sister Shari Camp at [address] [facility] [place] [facility] [place] [address]/phone [phone].  Pt states that she has a rolling walker and wheelchair and BSC however the BSC is too small.  Pt denies home health services as [facility] does not have any providers in [place].  Pt said [facility].  Pt's PCP is Dr. You and insurance is [facility].  Pt's discharge plan is to return home with her sister and follow up at [facility] out[facility] for therapy.         08/30/19 0950   Discharge Assessment   Assessment Type Discharge Planning Assessment   Confirmed/corrected address and phone number on facesheet? Yes   Assessment information obtained from? Patient   Prior to hospitilization cognitive status: Alert/Oriented   Prior to hospitalization functional status: Assistive Equipment   Current cognitive status: Alert/Oriented   Current Functional Status: Needs Assistance   Lives With sibling(s)  (Pt has been staying with her sister Shari since pt's sugery several weeks ago. )   Able to Return to Prior Arrangements yes   Is patient able to care for self after discharge? No   Who are your caregiver(s) and their phone number(s)?   (sister Shari Camp, [phone])   Patient's perception of discharge disposition home or selfcare   Readmission Within the Last 30 Days previous discharge plan unsuccessful   Patient currently being followed by outpatient case management? Yes   If yes, name of outpatient case management following: insurance company assigned oupatient case management   Equipment Currently Used at Home walker, rolling;wheelchair;cane, straight;other (see comments)  (Pt stated she has a BSC however it is too small and is awaire the insurance will not pay for a new one.)   Do you have any problems affording any of your prescribed medications?  No  (pharmacy is [facility] in [place])   Is the patient taking medications as prescribed? yes   Does the patient have transportation home? Yes   Transportation Anticipated family or friend will provide   Does the patient receive services at the [facility]? No   Discharge Plan A Home with family   DME Needed Upon Discharge  none   Patient/Family in Agreement with Plan yes

## 2019-08-31 ENCOUNTER — OUTSIDE PLACE OF SERVICE (OUTPATIENT)
Dept: INFECTIOUS DISEASES | Facility: CLINIC | Age: 64
End: 2019-08-31
Payer: MEDICARE

## 2019-08-31 PROBLEM — A41.9 SEPTIC SHOCK: Status: ACTIVE | Noted: 2019-08-31

## 2019-08-31 PROBLEM — R65.21 SEPTIC SHOCK: Status: ACTIVE | Noted: 2019-08-31

## 2019-08-31 PROBLEM — D62 ACUTE BLOOD LOSS ANEMIA: Status: ACTIVE | Noted: 2019-08-31

## 2019-08-31 LAB
ABO + RH BLD: NORMAL
ANION GAP SERPL CALC-SCNC: 8 MMOL/L (ref 8–16)
ANION GAP SERPL CALC-SCNC: 8 MMOL/L (ref 8–16)
BASOPHILS # BLD AUTO: 0.03 K/UL (ref 0–0.2)
BASOPHILS NFR BLD: 0.3 % (ref 0–1.9)
BLD GP AB SCN CELLS X3 SERPL QL: NORMAL
BLD PROD TYP BPU: NORMAL
BLOOD UNIT EXPIRATION DATE: NORMAL
BLOOD UNIT TYPE CODE: 1700
BLOOD UNIT TYPE: NORMAL
BUN SERPL-MCNC: 11 MG/DL (ref 8–23)
BUN SERPL-MCNC: 14 MG/DL (ref 8–23)
CALCIUM SERPL-MCNC: 7.6 MG/DL (ref 8.7–10.5)
CALCIUM SERPL-MCNC: 8.3 MG/DL (ref 8.7–10.5)
CHLORIDE SERPL-SCNC: 104 MMOL/L (ref 95–110)
CHLORIDE SERPL-SCNC: 98 MMOL/L (ref 95–110)
CO2 SERPL-SCNC: 22 MMOL/L (ref 23–29)
CO2 SERPL-SCNC: 24 MMOL/L (ref 23–29)
CODING SYSTEM: NORMAL
CREAT SERPL-MCNC: 0.8 MG/DL (ref 0.5–1.4)
CREAT SERPL-MCNC: 1 MG/DL (ref 0.5–1.4)
DIFFERENTIAL METHOD: ABNORMAL
DISPENSE STATUS: NORMAL
EOSINOPHIL # BLD AUTO: 0.1 K/UL (ref 0–0.5)
EOSINOPHIL NFR BLD: 1 % (ref 0–8)
ERYTHROCYTE [DISTWIDTH] IN BLOOD BY AUTOMATED COUNT: 15.5 % (ref 11.5–14.5)
EST. GFR  (AFRICAN AMERICAN): >60 ML/MIN/1.73 M^2
EST. GFR  (AFRICAN AMERICAN): >60 ML/MIN/1.73 M^2
EST. GFR  (NON AFRICAN AMERICAN): 59 ML/MIN/1.73 M^2
EST. GFR  (NON AFRICAN AMERICAN): >60 ML/MIN/1.73 M^2
GLUCOSE SERPL-MCNC: 120 MG/DL (ref 70–110)
GLUCOSE SERPL-MCNC: 120 MG/DL (ref 70–110)
HCT VFR BLD AUTO: 24.2 % (ref 37–48.5)
HGB BLD-MCNC: 7.6 G/DL (ref 12–16)
IMM GRANULOCYTES # BLD AUTO: 0.07 K/UL (ref 0–0.04)
LYMPHOCYTES # BLD AUTO: 1.3 K/UL (ref 1–4.8)
LYMPHOCYTES NFR BLD: 11.7 % (ref 18–48)
MCH RBC QN AUTO: 31.3 PG (ref 27–31)
MCHC RBC AUTO-ENTMCNC: 31.4 G/DL (ref 32–36)
MCV RBC AUTO: 100 FL (ref 82–98)
MONOCYTES # BLD AUTO: 1 K/UL (ref 0.3–1)
MONOCYTES NFR BLD: 9.2 % (ref 4–15)
NEUTROPHILS # BLD AUTO: 8.4 K/UL (ref 1.8–7.7)
NEUTROPHILS NFR BLD: 77.2 % (ref 38–73)
NRBC BLD-RTO: 0 /100 WBC
NUM UNITS TRANS PACKED RBC: NORMAL
PLATELET # BLD AUTO: 441 K/UL (ref 150–350)
PMV BLD AUTO: 8.5 FL (ref 9.2–12.9)
POTASSIUM SERPL-SCNC: 3.1 MMOL/L (ref 3.5–5.1)
POTASSIUM SERPL-SCNC: 3.5 MMOL/L (ref 3.5–5.1)
RBC # BLD AUTO: 2.43 M/UL (ref 4–5.4)
SODIUM SERPL-SCNC: 130 MMOL/L (ref 136–145)
SODIUM SERPL-SCNC: 134 MMOL/L (ref 136–145)
VANCOMYCIN TROUGH SERPL-MCNC: 22.3 UG/ML (ref 10–22)
VANCOMYCIN TROUGH SERPL-MCNC: 26.7 UG/ML (ref 10–22)
WBC # BLD AUTO: 10.86 K/UL (ref 3.9–12.7)

## 2019-08-31 PROCEDURE — 99223 1ST HOSP IP/OBS HIGH 75: CPT | Mod: S$GLB,,, | Performed by: INTERNAL MEDICINE

## 2019-08-31 PROCEDURE — P9016 RBC LEUKOCYTES REDUCED: HCPCS

## 2019-08-31 PROCEDURE — 25000003 PHARM REV CODE 250: Performed by: HOSPITALIST

## 2019-08-31 PROCEDURE — 63600175 PHARM REV CODE 636 W HCPCS: Performed by: NURSE PRACTITIONER

## 2019-08-31 PROCEDURE — 85025 COMPLETE CBC W/AUTO DIFF WBC: CPT

## 2019-08-31 PROCEDURE — 94761 N-INVAS EAR/PLS OXIMETRY MLT: CPT

## 2019-08-31 PROCEDURE — 80048 BASIC METABOLIC PNL TOTAL CA: CPT

## 2019-08-31 PROCEDURE — 99900035 HC TECH TIME PER 15 MIN (STAT)

## 2019-08-31 PROCEDURE — 25000003 PHARM REV CODE 250: Performed by: ORTHOPAEDIC SURGERY

## 2019-08-31 PROCEDURE — A4216 STERILE WATER/SALINE, 10 ML: HCPCS | Performed by: INTERNAL MEDICINE

## 2019-08-31 PROCEDURE — 99223 PR INITIAL HOSPITAL CARE,LEVL III: ICD-10-PCS | Mod: S$GLB,,, | Performed by: INTERNAL MEDICINE

## 2019-08-31 PROCEDURE — 25000003 PHARM REV CODE 250: Performed by: INTERNAL MEDICINE

## 2019-08-31 PROCEDURE — 63600175 PHARM REV CODE 636 W HCPCS: Performed by: INTERNAL MEDICINE

## 2019-08-31 PROCEDURE — 86901 BLOOD TYPING SEROLOGIC RH(D): CPT

## 2019-08-31 PROCEDURE — 80202 ASSAY OF VANCOMYCIN: CPT

## 2019-08-31 PROCEDURE — 86920 COMPATIBILITY TEST SPIN: CPT

## 2019-08-31 PROCEDURE — 25000003 PHARM REV CODE 250: Performed by: NURSE PRACTITIONER

## 2019-08-31 PROCEDURE — 36430 TRANSFUSION BLD/BLD COMPNT: CPT

## 2019-08-31 PROCEDURE — 36415 COLL VENOUS BLD VENIPUNCTURE: CPT

## 2019-08-31 PROCEDURE — 27000221 HC OXYGEN, UP TO 24 HOURS

## 2019-08-31 PROCEDURE — 20000000 HC ICU ROOM

## 2019-08-31 RX ORDER — IBUPROFEN 600 MG/1
600 TABLET ORAL EVERY 6 HOURS
Status: DISCONTINUED | OUTPATIENT
Start: 2019-08-31 | End: 2019-09-02

## 2019-08-31 RX ORDER — HYDROCODONE BITARTRATE AND ACETAMINOPHEN 500; 5 MG/1; MG/1
TABLET ORAL
Status: DISCONTINUED | OUTPATIENT
Start: 2019-08-31 | End: 2019-09-05 | Stop reason: HOSPADM

## 2019-08-31 RX ORDER — IBUPROFEN 600 MG/1
TABLET ORAL
Status: DISPENSED
Start: 2019-08-31 | End: 2019-08-31

## 2019-08-31 RX ORDER — IBUPROFEN 600 MG/1
600 TABLET ORAL 4 TIMES DAILY
Status: DISCONTINUED | OUTPATIENT
Start: 2019-08-31 | End: 2019-08-31

## 2019-08-31 RX ORDER — POTASSIUM CHLORIDE 20 MEQ/1
40 TABLET, EXTENDED RELEASE ORAL
Status: DISCONTINUED | OUTPATIENT
Start: 2019-08-31 | End: 2019-09-03

## 2019-08-31 RX ORDER — POTASSIUM CHLORIDE 20 MEQ/1
60 TABLET, EXTENDED RELEASE ORAL
Status: DISCONTINUED | OUTPATIENT
Start: 2019-08-31 | End: 2019-09-03

## 2019-08-31 RX ADMIN — PIPERACILLIN AND TAZOBACTAM 4.5 G: 4; .5 INJECTION, POWDER, LYOPHILIZED, FOR SOLUTION INTRAVENOUS; PARENTERAL at 05:08

## 2019-08-31 RX ADMIN — NOREPINEPHRINE BITARTRATE 0.08 MCG/KG/MIN: 8 SOLUTION at 01:08

## 2019-08-31 RX ADMIN — Medication 10 ML: at 12:08

## 2019-08-31 RX ADMIN — HYDROCODONE BITARTRATE AND ACETAMINOPHEN 1 TABLET: 10; 325 TABLET ORAL at 09:08

## 2019-08-31 RX ADMIN — POTASSIUM CHLORIDE 40 MEQ: 20 TABLET, EXTENDED RELEASE ORAL at 06:08

## 2019-08-31 RX ADMIN — Medication 10 ML: at 06:08

## 2019-08-31 RX ADMIN — IBUPROFEN 600 MG: 600 TABLET ORAL at 11:08

## 2019-08-31 RX ADMIN — PANTOPRAZOLE SODIUM 40 MG: 40 TABLET, DELAYED RELEASE ORAL at 09:08

## 2019-08-31 RX ADMIN — HYDROCODONE BITARTRATE AND ACETAMINOPHEN 1 TABLET: 10; 325 TABLET ORAL at 07:08

## 2019-08-31 RX ADMIN — IBUPROFEN 600 MG: 600 TABLET ORAL at 09:08

## 2019-08-31 RX ADMIN — SODIUM CHLORIDE 500 ML: 0.9 INJECTION, SOLUTION INTRAVENOUS at 12:08

## 2019-08-31 RX ADMIN — HYDROCODONE BITARTRATE AND ACETAMINOPHEN 1 TABLET: 10; 325 TABLET ORAL at 05:08

## 2019-08-31 RX ADMIN — IBUPROFEN 600 MG: 600 TABLET ORAL at 05:08

## 2019-08-31 RX ADMIN — PIPERACILLIN AND TAZOBACTAM 4.5 G: 4; .5 INJECTION, POWDER, LYOPHILIZED, FOR SOLUTION INTRAVENOUS; PARENTERAL at 01:08

## 2019-08-31 RX ADMIN — SODIUM CHLORIDE: 0.9 INJECTION, SOLUTION INTRAVENOUS at 01:08

## 2019-08-31 RX ADMIN — ASPIRIN 81 MG: 81 TABLET, COATED ORAL at 08:08

## 2019-08-31 RX ADMIN — FERROUS SULFATE TAB EC 325 MG (65 MG FE EQUIVALENT) 325 MG: 325 (65 FE) TABLET DELAYED RESPONSE at 09:08

## 2019-08-31 RX ADMIN — LOVASTATIN 20 MG: 20 TABLET ORAL at 08:08

## 2019-08-31 RX ADMIN — OXYCODONE AND ACETAMINOPHEN 1 TABLET: 10; 325 TABLET ORAL at 08:08

## 2019-08-31 RX ADMIN — ASPIRIN 81 MG: 81 TABLET, COATED ORAL at 09:08

## 2019-08-31 RX ADMIN — HYDROCODONE BITARTRATE AND ACETAMINOPHEN 1 TABLET: 10; 325 TABLET ORAL at 01:08

## 2019-08-31 RX ADMIN — VANCOMYCIN HYDROCHLORIDE 1250 MG: 1.25 INJECTION, POWDER, LYOPHILIZED, FOR SOLUTION INTRAVENOUS at 06:08

## 2019-08-31 RX ADMIN — TRAZODONE HYDROCHLORIDE 50 MG: 50 TABLET ORAL at 08:08

## 2019-08-31 RX ADMIN — FLUOXETINE 40 MG: 20 CAPSULE ORAL at 09:08

## 2019-08-31 RX ADMIN — POTASSIUM CHLORIDE 40 MEQ: 20 TABLET, EXTENDED RELEASE ORAL at 08:08

## 2019-08-31 RX ADMIN — FLUTICASONE PROPIONATE 50 MCG: 50 SPRAY, METERED NASAL at 09:08

## 2019-08-31 RX ADMIN — PIPERACILLIN AND TAZOBACTAM 4.5 G: 4; .5 INJECTION, POWDER, LYOPHILIZED, FOR SOLUTION INTRAVENOUS; PARENTERAL at 09:08

## 2019-08-31 NOTE — SUBJECTIVE & OBJECTIVE
Interval History:  Patient seen and examined.  Overnight, patient developed severe hypotension and received 1 L normal saline fluid bolus by nocturnist.  Patient's hypotension persisted and she was moved to the ICU for further evaluation and management.  PICC line was placed, and the patient was started on Levophed for management of septic shock.  This morning, she appears to improved, her blood pressure remained stable.  She is complaining of pain in her knee but otherwise no other symptoms.    Review of Systems   Constitutional: Positive for fatigue.   Respiratory: Positive for shortness of breath. Negative for cough.    Cardiovascular: Negative for chest pain and leg swelling.   Gastrointestinal: Negative for abdominal pain and nausea.   Musculoskeletal: Positive for gait problem and myalgias.   Neurological: Negative for weakness.   Psychiatric/Behavioral: Negative for confusion.   All other systems reviewed and are negative.    Objective:     Vital Signs (Most Recent):  Temp: 98.4 °F (36.9 °C) (08/31/19 0737)  Pulse: 79 (08/31/19 0815)  Resp: (!) 30 (08/31/19 0815)  BP: 109/60 (08/31/19 0815)  SpO2: 100 % (08/31/19 0815) Vital Signs (24h Range):  Temp:  [96.3 °F (35.7 °C)-98.4 °F (36.9 °C)] 98.4 °F (36.9 °C)  Pulse:  [68-88] 79  Resp:  [13-40] 30  SpO2:  [87 %-100 %] 100 %  BP: ()/(44-75) 109/60     Weight: 129.9 kg (286 lb 6 oz)  Body mass index is 52.38 kg/m².    Intake/Output Summary (Last 24 hours) at 8/31/2019 1045  Last data filed at 8/31/2019 0700  Gross per 24 hour   Intake 2382.3 ml   Output 1241 ml   Net 1141.3 ml      Physical Exam   Constitutional: She is oriented to person, place, and time.   Morbidly obese  female in no acute distress   Eyes: Pupils are equal, round, and reactive to light. EOM are normal.   Cardiovascular: Normal rate, regular rhythm and intact distal pulses.   No murmur heard.  Pulmonary/Chest: Effort normal and breath sounds normal.   Abdominal: Soft. She exhibits  no distension. There is no tenderness.   Musculoskeletal: She exhibits edema.   Right lower extremity noted with wound in place with JOSE unit and dressing.  Dressing was not taken down.   Neurological: She is alert and oriented to person, place, and time.   Skin: No rash noted. No pallor.   Nursing note and vitals reviewed.      Significant Labs: All pertinent labs within the past 24 hours have been reviewed.    Significant Imaging: I have reviewed all pertinent imaging results/findings within the past 24 hours.

## 2019-08-31 NOTE — PROGRESS NOTES
eICU Note    Subjective: Low BP; post wound drainage post knee surgery       Objective:SBP 70's    Data review done     Video review done awake    Assessment:  ? Sepsis   L 0.8  Hb mild decrease 8     Plan:  1Fluids , levophed   2Monitor pain  3Watch for sepsis worsening    DVT prophylaxis SCD  GI prophylaxis PPI  Ventilator settings na    Communication with RN

## 2019-08-31 NOTE — NURSING
20:04  BP 79/44, spoke with JONE Bull NP, 1000cc bolus NS given.    21:51  Follow up BP 93/46.    23:00  Patient requesting pain medicine, reports severe pain, is moaning and crying.  BP 77/45.  Spoke with JONE Bull NP, 500cc bolus started, NP up to see patient, pt transferred to .  Eye glasses and cell phone with patient, other belongings sent home with family member.

## 2019-08-31 NOTE — PROGRESS NOTES
[facility] Hospital Medicine  Progress Note    Patient Name: Iris Mueller  MRN: [id number]  Patient Class: IP- Inpatient   Admission Date: 8/29/2019  Length of Stay: 2 days  Attending Physician: Billy Lincoln MD  Primary Care Provider: Elkin You MD        Subjective:     Principal Problem:Severe sepsis    HPI:  No notes on file    Overview/Hospital Course:  No notes on file    Interval History:  Patient seen and examined.  Overnight, patient developed severe hypotension and received 1 L normal saline fluid bolus by nocturnist.  Patient's hypotension persisted and she was moved to the ICU for further evaluation and management.  PICC line was placed, and the patient was started on Levophed for management of septic shock.  This morning, she appears to improved, her blood pressure remained stable.  She is complaining of pain in her knee but otherwise no other symptoms.    Review of Systems   Constitutional: Positive for fatigue.   Respiratory: Positive for shortness of breath. Negative for cough.    Cardiovascular: Negative for chest pain and leg swelling.   Gastrointestinal: Negative for abdominal pain and nausea.   Musculoskeletal: Positive for gait problem and myalgias.   Neurological: Negative for weakness.   Psychiatric/Behavioral: Negative for confusion.   All other systems reviewed and are negative.    Objective:     Vital Signs (Most Recent):  Temp: 98.4 °F (36.9 °C) (08/31/19 0737)  Pulse: 79 (08/31/19 0815)  Resp: (!) 30 (08/31/19 0815)  BP: 109/60 (08/31/19 0815)  SpO2: 100 % (08/31/19 0815) Vital Signs (24h Range):  Temp:  [96.3 °F (35.7 °C)-98.4 °F (36.9 °C)] 98.4 °F (36.9 °C)  Pulse:  [68-88] 79  Resp:  [13-40] 30  SpO2:  [87 %-100 %] 100 %  BP: ()/(44-75) 109/60     Weight: 129.9 kg (286 lb 6 oz)  Body mass index is 52.38 kg/m².    Intake/Output Summary (Last 24 hours) at 8/31/2019 1045  Last data filed at 8/31/2019 0700  Gross per 24 hour   Intake 2382.3 ml    Output 1241 ml   Net 1141.3 ml      Physical Exam   Constitutional: She is oriented to person, place, and time.   Morbidly obese  female in no acute distress   Eyes: Pupils are equal, round, and reactive to light. EOM are normal.   Cardiovascular: Normal rate, regular rhythm and intact distal pulses.   No murmur heard.  Pulmonary/Chest: Effort normal and breath sounds normal.   Abdominal: Soft. She exhibits no distension. There is no tenderness.   Musculoskeletal: She exhibits edema.   Right lower extremity noted with wound in place with JOSE unit and dressing.  Dressing was not taken down.   Neurological: She is alert and oriented to person, place, and time.   Skin: No rash noted. No pallor.   Nursing note and vitals reviewed.      Significant Labs: All pertinent labs within the past 24 hours have been reviewed.    Significant Imaging: I have reviewed all pertinent imaging results/findings within the past 24 hours.      Assessment/Plan:      * Septic shock  This patient does have evidence of infective focus  My overall impression is septic shock.  Source:  Septic joint  Antibiotics given-   Antibiotics (From admission, onward)    Start     Stop Route Frequency Ordered    08/30/19 0500  vancomycin 1.25 g in dextrose 5% 250 mL IVPB (ready to mix)      -- IV Every 12 hours (non-standard times) 08/29/19 2050    08/30/19 0100  piperacillin-tazobactam 4.5 g in dextrose 5 % 100 mL IVPB (ready to mix system)      -- IV Every 8 hours (non-standard times) 08/29/19 2006        Latest lactate reviewed-  Recent Labs   Lab 08/30/19  0803 08/30/19  2319   LACTATE 0.7 0.8     Organ dysfunction indicated by Encephalopathy    Shock with decreased perfusion noted, Fluid challenge  was given at 30cc/kg IBW  Post- resuscitation assessment- (Done after fluids given for shock)  Vital signs post fluid administrations were-  Temp Readings from Last 1 Encounters:   08/31/19 98.4 °F (36.9 °C) (Oral)     BP Readings from Last 1  Encounters:   08/31/19 109/60     Pulse Readings from Last 1 Encounters:   08/31/19 79       Perfusion assessment post bolus shows Continues poor peripheral pulses and delayed capillary refill  Will Start Pressors- Levophed for MAP of 65  Source control achieved by:  Irrigation and removal of infected hematoma on her right knee on 08/30.          Severe sepsis with postop wound infection of right knee joint and right leg cellulitis  Treatment per above with septic shock      Acute blood loss anemia  Patient's anemia is currently uncontrolled. S/p 1 unit of PRBCs. Etiology likely d/t acute blood loss.  Current CBC reviewed-   Lab Results   Component Value Date    HGB 7.6 (L) 08/31/2019    HCT 24.2 (L) 08/31/2019     Will transfuse 1 U PRBCs- the patient is hypotensive and symptomatic.        Infected incision        Fibromyalgia  Continue pain medication regimen.  I would be hesitant to give the patient IV narcotics or expand her chronic regimen secondary to hypertension.  I have discussed this with the patient who understands.      Morbid obesity  Body mass index is 52.38 kg/m². Morbid obesity complicates all aspects of disease management from diagnostic modalities to treatment. Weight loss encouraged and health benefits explained to patient.        Essential hypertension  Blood pressure medication currently on hold secondary to hypotension from septic shock.      Osteoarthritis of right knee  Patient is status post total knee arthroplasty with secondary postop infection.      VTE Risk Mitigation (From admission, onward)        Ordered     IP VTE HIGH RISK PATIENT  Once      08/30/19 1613     Place KAROLINA hose  Until discontinued      08/30/19 1613     Place sequential compression device  Until discontinued      08/30/19 1613          Critical care time spent on the evaluation and treatment of severe organ dysfunction, review of pertinent labs and imaging studies, discussions with consulting providers and discussions  with patient/family:  38 minutes.      Billy Lincoln MD  Department of Hospital Medicine   [facility]

## 2019-08-31 NOTE — CARE UPDATE
08/31/19 0708   Patient Assessment/Suction   Level of Consciousness (AVPU) alert   PRE-TX-O2   O2 Device (Oxygen Therapy) nasal cannula   $ Is the patient on Low Flow Oxygen? Yes   Flow (L/min) 2   SpO2 100 %   Pulse Oximetry Type Continuous   $ Pulse Oximetry - Multiple Charge Pulse Oximetry - Multiple   Pulse 74   Resp (!) 39   Aerosol Therapy   $ Aerosol Therapy Charges PRN treatment not required

## 2019-08-31 NOTE — ASSESSMENT & PLAN NOTE
Patient's anemia is currently uncontrolled. S/p 1 unit of PRBCs. Etiology likely d/t acute blood loss.  Current CBC reviewed-   Lab Results   Component Value Date    HGB 7.6 (L) 08/31/2019    HCT 24.2 (L) 08/31/2019     Will transfuse 1 U PRBCs- the patient is hypotensive and symptomatic.

## 2019-08-31 NOTE — PROGRESS NOTES
Pharmacokinetic Assessment Follow Up: IV Vancomycin    Vancomycin serum concentration assessment(s):    The trough level was drawn correctly and can be used to guide therapy at this time. The measurement is above the desired definitive target range of 15 to 20 mcg/mL.    Vancomycin Regimen Plan:    Re-dose when the random level is less than 20 mcg/mL, next level to be drawn at 0600 on 09/01.     Drug levels (last 3 results):  Recent Labs   Lab Result Units 08/31/19  0342 08/31/19  1708   Vancomycin-Trough ug/mL 22.3* 26.7*       Pharmacy will continue to follow and monitor vancomycin.    Please contact pharmacy at extension [phone] for questions regarding this assessment.    Thank you for the consult,   Jen Lockwood, PharmD       Patient brief summary:  Iris Mueller is a 63 y.o. female initiated on antimicrobial therapy with IV Vancomycin for treatment of sepsis.    The patient's current regimen is Vancomycin 1250 mg IV every 12 hours.    Drug Allergies:   Review of patient's allergies indicates:  No Known Allergies    Actual Body Weight:   129.9 kg    Renal Function:   Estimated Creatinine Clearance: 90.8 mL/min (based on SCr of 0.8 mg/dL).,     Dialysis Method (if applicable):  N/A    CBC (last 72 hours):  Recent Labs   Lab Result Units 08/29/19  1630 08/30/19  0458 08/30/19 2319 08/31/19  0342   WBC K/uL 13.12* 10.14 11.73 10.86   Hemoglobin g/dL 9.4* 9.1* 8.0* 7.6*   Hematocrit % 28.7* 27.9* 25.2* 24.2*   Platelets K/uL 437* 434* 405* 441*   Gran% % 79.0* 75.3* 82.7* 77.2*   Lymph% % 11.7* 13.7* 7.8* 11.7*   Mono% % 7.4 8.7 8.1 9.2   Eosinophil% % 0.6 1.0 0.5 1.0   Basophil% % 0.2 0.2 0.3 0.3   Differential Method  Automated Automated Automated Automated       Metabolic Panel (last 72 hours):  Recent Labs   Lab Result Units 08/29/19  1630 08/30/19  0458 08/30/19  2319 08/31/19  0342   Sodium mmol/L 130* 132* 130* 134*   Potassium mmol/L 3.9 3.5 3.5 3.1*   Chloride mmol/L 96 96 98 104   CO2 mmol/L 26  26 24 22*   Glucose mg/dL 96 109 120* 120*   BUN, Bld mg/dL 18 15 14 11   Creatinine mg/dL 1.1 0.9 1.0 0.8   Albumin g/dL 2.9*  --   --   --    Total Bilirubin mg/dL 0.7  --   --   --    Alkaline Phosphatase U/L 94  --   --   --    AST U/L 11  --   --   --    ALT U/L 11  --   --   --        Vancomycin Administrations:  vancomycin given in the last 96 hours                     vancomycin 1.25 g in dextrose 5% 250 mL IVPB (ready to mix) (mg) 1,250 mg New Bag 08/31/19 0600     1,250 mg New Bag 08/30/19 1829     1,250 mg New Bag  0509    vancomycin (VANCOCIN) 1,750 mg in dextrose 5 % 500 mL IVPB (mg) 1,750 mg New Bag 08/29/19 1734                      Microbiologic Results:  Microbiology Results (last 7 days)       Procedure Component Value Units Date/Time    Aerobic culture [[id number]]  (Abnormal) Collected:  08/29/19 1634    Order Status:  Completed Specimen:  Incision site from Leg, Right Updated:  08/31/19 1328     Aerobic Bacterial Culture PROTEUS MIRABILIS  Many  Susceptibility pending        KLEBSIELLA PNEUMONIAE  Moderate  Susceptibility pending  No other significant isolate      Tissue culture [[id number]] Collected:  08/30/19 1521    Order Status:  Completed Specimen:  Wound from Knee, Right Updated:  08/31/19 0057     Gram Stain Result Rare WBC's      No organisms seen    Culture, Anaerobe [[id number]] Collected:  08/30/19 1521    Order Status:  Sent Specimen:  Wound from Knee, Right Updated:  08/30/19 2317    Aerobic culture [[id number]] Collected:  08/30/19 1521    Order Status:  Sent Specimen:  Wound from Knee, Right Updated:  08/30/19 8976

## 2019-08-31 NOTE — ASSESSMENT & PLAN NOTE
Body mass index is 52.38 kg/m². Morbid obesity complicates all aspects of disease management from diagnostic modalities to treatment. Weight loss encouraged and health benefits explained to patient.

## 2019-08-31 NOTE — OP NOTE
DATE OF PROCEDURE:  08/30/2019    PREOPERATIVE DIAGNOSIS:  Right knee wound dehiscence.    POSTOPERATIVE DIAGNOSIS:  Right knee wound dehiscence.    PROCEDURE PERFORMED:  Incision and debridement of right knee wound.    SURGEON:  Delio Chung M.D.    ASSISTANT:  Desi Mc.    ANESTHESIA:  General.    HISTORY:  Ms. Mueller is a 63-year-old woman who had a total knee arthroplasty   done several weeks ago.  She developed a hematoma and at an outside facility   the wound was opened by decompressing the hematoma.  She subsequently presented   to the Emergency Room yesterday with an open wound that was draining hematoma   and smelled bad.  She was admitted to the Hospitalist Service and started on IV   antibiotics.  Orthopedics was consulted.  Upon reviewing the wound, she had a 10   cm open wound in the lower half of her incision with hematoma and blood and a   foul smell.  We discussed with the patient that the recommended plan would be   debridement of this wound and inspection of the knee.  If the knee capsule was   intact, we would not violate the total knee arthroplasty, if it was, we would   wash the knee out and then come back to do a revision later.  The risks and   benefits of surgical intervention including the potential for incomplete pain   relief and the need for future procedures were explained to the patient who   expressed she understood and wished to proceed.  Informed consent was obtained.    PROCEDURE IN DETAIL:  After verifying informed consent and correct site, the   patient was brought back to the Operating Room, placed on the table in supine   position.  Preoperative IV antibiotics had already been administered and a   timeout was performed.  The patient's right lower extremity was then prepped and   draped in sterile fashion.  We began by inspecting the wound and evacuating the   hematoma.  The lower half of our incision was completely open.  As we started   to explore the wound, we opened  up the remainder of the incision, leaving only   approximately about an inch still intact.  There was a great deal of hematoma   and blood in the area.  This was all evacuated.  We then debrided the soft   tissue around the wound and under the wound, she had a fair amount of extension   of the subcutaneous dehiscence in both the superior and inferior direction,   mostly on the medial side of the knee.  All of the necrotic and nonviable tissue   in this area was removed.  The arthrotomy appeared to be intact.  At no point   did we find access to the knee or see any drainage from the knee.  We took   cultures of the tissue and then began copiously irrigating the wound.  We used a   total of 9 L of solution with a Pulsavac.  We also obtained hemostasis, so that   we had essentially no bleeding in the wound.  We freshened the wound edges with   a knife to give us a clean area for the wound to try to heal closed. We then   inserted a 3/16-inch Hemovac drain.  The wound was then closed using #2 Prolene   sutures.  Once the wound was closed, we then applied a JOSE negative pressure   dressing.  At this point, the patient was awoken from anesthesia, extubated, and   brought to the PACU in good condition.    TOTAL TOURNIQUET TIME:  None.    DRAINS:  One.    SPECIMENS:  Aerobic, anaerobic and tissue cultures.    ESTIMATED BLOOD LOSS:  Minimal.    POSTOPERATIVE PLAN:  She will be admitted back to the floor for IV antibiotics.      MARY/IN  dd: 08/30/2019 15:14:54 (CDT)  td: 08/30/2019 19:04:08 (CDT)  Doc ID   #[id number]  Job ID #[id number]    CC:

## 2019-08-31 NOTE — CONSULTS
Pharmacokinetic Assessment Follow Up: IV Vancomycin    Vancomycin serum concentration assessment(s):    The trough level was drawn late and can't be used to guide therapy at this time. The measurement is above the desired definitive target range of 15 to 20 mcg/mL.    Vancomycin Regimen Plan:    Continue regimen to Vancomycin 1.25 mg IV every 12 hours with next serum trough concentration measured at 1730 prior to next dose on 08/31     Drug levels (last 3 results):  Recent Labs   Lab Result Units 08/31/19  0342   Vancomycin-Trough ug/mL 22.3*       Pharmacy will continue to follow and monitor vancomycin.    Please contact pharmacy at extension [phone] for questions regarding this assessment.    Thank you for the consult,   Bárbara Pink

## 2019-08-31 NOTE — PLAN OF CARE
08/30/19 2013   Patient Assessment/Suction   Level of Consciousness (AVPU) alert   Respiratory Effort Normal;Unlabored   Expansion/Accessory Muscles/Retractions expansion symmetric   All Lung Fields Breath Sounds diminished   Rhythm/Pattern, Respiratory pattern regular   PRE-TX-O2   O2 Device (Oxygen Therapy) room air   SpO2 98 %   Pulse Oximetry Type Intermittent   Aerosol Therapy   $ Aerosol Therapy Charges PRN treatment not required

## 2019-08-31 NOTE — ASSESSMENT & PLAN NOTE
This patient does have evidence of infective focus  My overall impression is septic shock.  Source:  Septic joint  Antibiotics given-   Antibiotics (From admission, onward)    Start     Stop Route Frequency Ordered    08/30/19 0500  vancomycin 1.25 g in dextrose 5% 250 mL IVPB (ready to mix)      -- IV Every 12 hours (non-standard times) 08/29/19 2050 08/30/19 0100  piperacillin-tazobactam 4.5 g in dextrose 5 % 100 mL IVPB (ready to mix system)      -- IV Every 8 hours (non-standard times) 08/29/19 2006        Latest lactate reviewed-  Recent Labs   Lab 08/30/19  0803 08/30/19  2319   LACTATE 0.7 0.8     Organ dysfunction indicated by Encephalopathy    Shock with decreased perfusion noted, Fluid challenge  was given at 30cc/kg IBW  Post- resuscitation assessment- (Done after fluids given for shock)  Vital signs post fluid administrations were-  Temp Readings from Last 1 Encounters:   08/31/19 98.4 °F (36.9 °C) (Oral)     BP Readings from Last 1 Encounters:   08/31/19 109/60     Pulse Readings from Last 1 Encounters:   08/31/19 79       Perfusion assessment post bolus shows Continues poor peripheral pulses and delayed capillary refill  Will Start Pressors- Levophed for MAP of 65  Source control achieved by:  Irrigation and removal of infected hematoma on her right knee on 08/30.

## 2019-08-31 NOTE — ASSESSMENT & PLAN NOTE
Continue pain medication regimen.  I would be hesitant to give the patient IV narcotics or expand her chronic regimen secondary to hypertension.  I have discussed this with the patient who understands.

## 2019-08-31 NOTE — PT/OT/SLP PROGRESS
Physical Therapy      Patient Name:  Iris Mueller   MRN:  [id number]    Patient not seen today secondary to blood pressure issues. Spoke with patient regarding performing ankle pumps, quad sets and gluteal sets periodically  . Will follow-up 9/1/2019.    Sanjeev Gustafson, PT

## 2019-08-31 NOTE — PROVIDER TRANSFER
Notified of BP 77/45.  Hypotensive earlier this evening.  Pt was given a 1L bolus NS.  Brief improvement in hypotension with fluids; however, unable to sustain acceptable BP.  Transferred to ICU; will check lactic acid and CBC.  Initiate levophed for hypotension.

## 2019-09-01 LAB
ALBUMIN SERPL BCP-MCNC: 2.4 G/DL (ref 3.5–5.2)
ALP SERPL-CCNC: 106 U/L (ref 55–135)
ALT SERPL W/O P-5'-P-CCNC: 13 U/L (ref 10–44)
ANION GAP SERPL CALC-SCNC: 9 MMOL/L (ref 8–16)
AST SERPL-CCNC: 13 U/L (ref 10–40)
BASOPHILS # BLD AUTO: 0.03 K/UL (ref 0–0.2)
BASOPHILS NFR BLD: 0.3 % (ref 0–1.9)
BILIRUB SERPL-MCNC: 0.5 MG/DL (ref 0.1–1)
BUN SERPL-MCNC: 10 MG/DL (ref 8–23)
CALCIUM SERPL-MCNC: 8.7 MG/DL (ref 8.7–10.5)
CHLORIDE SERPL-SCNC: 104 MMOL/L (ref 95–110)
CO2 SERPL-SCNC: 24 MMOL/L (ref 23–29)
CREAT SERPL-MCNC: 0.9 MG/DL (ref 0.5–1.4)
CRP SERPL-MCNC: 138 MG/L (ref 0–8.2)
DIFFERENTIAL METHOD: ABNORMAL
EOSINOPHIL # BLD AUTO: 0.3 K/UL (ref 0–0.5)
EOSINOPHIL NFR BLD: 2.7 % (ref 0–8)
ERYTHROCYTE [DISTWIDTH] IN BLOOD BY AUTOMATED COUNT: 16.7 % (ref 11.5–14.5)
EST. GFR  (AFRICAN AMERICAN): >60 ML/MIN/1.73 M^2
EST. GFR  (NON AFRICAN AMERICAN): >60 ML/MIN/1.73 M^2
GLUCOSE SERPL-MCNC: 116 MG/DL (ref 70–110)
HCT VFR BLD AUTO: 30.3 % (ref 37–48.5)
HGB BLD-MCNC: 9.6 G/DL (ref 12–16)
IMM GRANULOCYTES # BLD AUTO: 0.06 K/UL (ref 0–0.04)
LYMPHOCYTES # BLD AUTO: 0.9 K/UL (ref 1–4.8)
LYMPHOCYTES NFR BLD: 8.2 % (ref 18–48)
MAGNESIUM SERPL-MCNC: 1.7 MG/DL (ref 1.6–2.6)
MCH RBC QN AUTO: 30.7 PG (ref 27–31)
MCHC RBC AUTO-ENTMCNC: 31.7 G/DL (ref 32–36)
MCV RBC AUTO: 97 FL (ref 82–98)
MONOCYTES # BLD AUTO: 0.6 K/UL (ref 0.3–1)
MONOCYTES NFR BLD: 5.4 % (ref 4–15)
NEUTROPHILS # BLD AUTO: 8.7 K/UL (ref 1.8–7.7)
NEUTROPHILS NFR BLD: 82.8 % (ref 38–73)
NRBC BLD-RTO: 0 /100 WBC
PHOSPHATE SERPL-MCNC: 2.8 MG/DL (ref 2.7–4.5)
PLATELET # BLD AUTO: 459 K/UL (ref 150–350)
PMV BLD AUTO: 8.3 FL (ref 9.2–12.9)
POTASSIUM SERPL-SCNC: 4.2 MMOL/L (ref 3.5–5.1)
PROCALCITONIN SERPL IA-MCNC: 0.08 NG/ML
PROT SERPL-MCNC: 6 G/DL (ref 6–8.4)
RBC # BLD AUTO: 3.13 M/UL (ref 4–5.4)
SODIUM SERPL-SCNC: 137 MMOL/L (ref 136–145)
VANCOMYCIN SERPL-MCNC: 15.9 UG/ML
WBC # BLD AUTO: 10.48 K/UL (ref 3.9–12.7)

## 2019-09-01 PROCEDURE — G8979 MOBILITY GOAL STATUS: HCPCS | Mod: CL | Performed by: PHYSICAL THERAPIST

## 2019-09-01 PROCEDURE — 25000003 PHARM REV CODE 250: Performed by: HOSPITALIST

## 2019-09-01 PROCEDURE — 97161 PT EVAL LOW COMPLEX 20 MIN: CPT | Performed by: PHYSICAL THERAPIST

## 2019-09-01 PROCEDURE — 25000003 PHARM REV CODE 250: Performed by: INTERNAL MEDICINE

## 2019-09-01 PROCEDURE — 63600175 PHARM REV CODE 636 W HCPCS: Performed by: INTERNAL MEDICINE

## 2019-09-01 PROCEDURE — 99231 SBSQ HOSP IP/OBS SF/LOW 25: CPT | Mod: S$GLB,,, | Performed by: INTERNAL MEDICINE

## 2019-09-01 PROCEDURE — 25000003 PHARM REV CODE 250: Performed by: NURSE PRACTITIONER

## 2019-09-01 PROCEDURE — A4216 STERILE WATER/SALINE, 10 ML: HCPCS | Performed by: INTERNAL MEDICINE

## 2019-09-01 PROCEDURE — 84100 ASSAY OF PHOSPHORUS: CPT

## 2019-09-01 PROCEDURE — 80202 ASSAY OF VANCOMYCIN: CPT

## 2019-09-01 PROCEDURE — 99900035 HC TECH TIME PER 15 MIN (STAT)

## 2019-09-01 PROCEDURE — 84145 PROCALCITONIN (PCT): CPT

## 2019-09-01 PROCEDURE — 86140 C-REACTIVE PROTEIN: CPT

## 2019-09-01 PROCEDURE — 80053 COMPREHEN METABOLIC PANEL: CPT

## 2019-09-01 PROCEDURE — 20000000 HC ICU ROOM

## 2019-09-01 PROCEDURE — G8978 MOBILITY CURRENT STATUS: HCPCS | Mod: CM | Performed by: PHYSICAL THERAPIST

## 2019-09-01 PROCEDURE — 25000003 PHARM REV CODE 250: Performed by: ORTHOPAEDIC SURGERY

## 2019-09-01 PROCEDURE — 85025 COMPLETE CBC W/AUTO DIFF WBC: CPT

## 2019-09-01 PROCEDURE — 36415 COLL VENOUS BLD VENIPUNCTURE: CPT

## 2019-09-01 PROCEDURE — 99231 PR SUBSEQUENT HOSPITAL CARE,LEVL I: ICD-10-PCS | Mod: S$GLB,,, | Performed by: INTERNAL MEDICINE

## 2019-09-01 PROCEDURE — 83735 ASSAY OF MAGNESIUM: CPT

## 2019-09-01 PROCEDURE — 94761 N-INVAS EAR/PLS OXIMETRY MLT: CPT

## 2019-09-01 RX ORDER — OXYCODONE HYDROCHLORIDE 10 MG/1
10 TABLET ORAL EVERY 6 HOURS PRN
Status: DISCONTINUED | OUTPATIENT
Start: 2019-09-01 | End: 2019-09-05 | Stop reason: HOSPADM

## 2019-09-01 RX ADMIN — HYDROCODONE BITARTRATE AND ACETAMINOPHEN 1 TABLET: 10; 325 TABLET ORAL at 11:09

## 2019-09-01 RX ADMIN — FLUTICASONE PROPIONATE 50 MCG: 50 SPRAY, METERED NASAL at 09:09

## 2019-09-01 RX ADMIN — SODIUM CHLORIDE 100 ML/HR: 0.9 INJECTION, SOLUTION INTRAVENOUS at 09:09

## 2019-09-01 RX ADMIN — IBUPROFEN 600 MG: 600 TABLET ORAL at 11:09

## 2019-09-01 RX ADMIN — NOREPINEPHRINE BITARTRATE 0.06 MCG/KG/MIN: 8 SOLUTION at 05:09

## 2019-09-01 RX ADMIN — PIPERACILLIN AND TAZOBACTAM 4.5 G: 4; .5 INJECTION, POWDER, LYOPHILIZED, FOR SOLUTION INTRAVENOUS; PARENTERAL at 08:09

## 2019-09-01 RX ADMIN — OXYCODONE HYDROCHLORIDE 10 MG: 10 TABLET ORAL at 07:09

## 2019-09-01 RX ADMIN — LOVASTATIN 20 MG: 20 TABLET ORAL at 09:09

## 2019-09-01 RX ADMIN — PIPERACILLIN AND TAZOBACTAM 4.5 G: 4; .5 INJECTION, POWDER, LYOPHILIZED, FOR SOLUTION INTRAVENOUS; PARENTERAL at 04:09

## 2019-09-01 RX ADMIN — ASPIRIN 81 MG: 81 TABLET, COATED ORAL at 09:09

## 2019-09-01 RX ADMIN — HYDROCODONE BITARTRATE AND ACETAMINOPHEN 1 TABLET: 10; 325 TABLET ORAL at 09:09

## 2019-09-01 RX ADMIN — HYDROCODONE BITARTRATE AND ACETAMINOPHEN 1 TABLET: 10; 325 TABLET ORAL at 04:09

## 2019-09-01 RX ADMIN — OXYCODONE AND ACETAMINOPHEN 1 TABLET: 10; 325 TABLET ORAL at 08:09

## 2019-09-01 RX ADMIN — PANTOPRAZOLE SODIUM 40 MG: 40 TABLET, DELAYED RELEASE ORAL at 08:09

## 2019-09-01 RX ADMIN — HYDROCODONE BITARTRATE AND ACETAMINOPHEN 1 TABLET: 10; 325 TABLET ORAL at 12:09

## 2019-09-01 RX ADMIN — ASPIRIN 81 MG: 81 TABLET, COATED ORAL at 08:09

## 2019-09-01 RX ADMIN — HYDROCODONE BITARTRATE AND ACETAMINOPHEN 1 TABLET: 10; 325 TABLET ORAL at 05:09

## 2019-09-01 RX ADMIN — FERROUS SULFATE TAB EC 325 MG (65 MG FE EQUIVALENT) 325 MG: 325 (65 FE) TABLET DELAYED RESPONSE at 08:09

## 2019-09-01 RX ADMIN — IBUPROFEN 600 MG: 600 TABLET ORAL at 05:09

## 2019-09-01 RX ADMIN — TRAZODONE HYDROCHLORIDE 50 MG: 50 TABLET ORAL at 09:09

## 2019-09-01 RX ADMIN — Medication 10 ML: at 11:09

## 2019-09-01 RX ADMIN — CEFTRIAXONE 2 G: 2 INJECTION, SOLUTION INTRAVENOUS at 05:09

## 2019-09-01 RX ADMIN — PIPERACILLIN AND TAZOBACTAM 4.5 G: 4; .5 INJECTION, POWDER, LYOPHILIZED, FOR SOLUTION INTRAVENOUS; PARENTERAL at 12:09

## 2019-09-01 RX ADMIN — NOREPINEPHRINE BITARTRATE 0.08 MCG/KG/MIN: 8 SOLUTION at 12:09

## 2019-09-01 RX ADMIN — FLUOXETINE 40 MG: 20 CAPSULE ORAL at 08:09

## 2019-09-01 NOTE — PLAN OF CARE
Problem: Adult Inpatient Plan of Care  Goal: Plan of Care Review  Outcome: Ongoing (interventions implemented as appropriate)  Pt weaning off of levophed; ABX continued and adjusted per MD; pain managed with oral meds; will continue current plan of care at this time

## 2019-09-01 NOTE — PT/OT/SLP EVAL
Physical Therapy Evaluation    Patient Name:  Iris Mueller   MRN:  [id number]    Recommendations:     Discharge Recommendations:  outpatient PT   Discharge Equipment Recommendations:     Barriers to discharge: None    Assessment:     Iris Mueller is a 63 y.o. female admitted with a medical diagnosis of Septic shock.  She presents with the following impairments/functional limitations:  weakness, impaired endurance, gait instability, impaired functional mobilty, decreased lower extremity function, decreased ROM .    Rehab Prognosis: Fair; patient would benefit from acute skilled PT services to address these deficits and reach maximum level of function.    Recent Surgery: Procedure(s) (LRB):  INCISION AND DRAINAGE, LOWER EXTREMITY (Right) 2 Days Post-Op    Plan:     During this hospitalization, patient to be seen BID to address the identified rehab impairments via gait training, therapeutic activities, therapeutic exercises and progress toward the following goals:    · Plan of Care Expires:       Subjective     Chief Complaint: Right knee pain  Patient/Family Comments/goals: Decrease pain  Pain/Comfort:  · Pain Rating 1: 7/10  · Location 1: leg    Patients cultural, spiritual, Buddhist conflicts given the current situation:      Living Environment:    Prior to admission, patients level of function was independent.  Equipment used at home: walker, rolling, wheelchair, cane, straight.  DME owned (not currently used): rolling walker.  Upon discharge, patient will have assistance from family.    Objective:     Communicated with nursing prior to session.  Patient found supine with peripheral IV, telemetry  upon PT entry to room.    General Precautions: Standard,     Orthopedic Precautions:    Braces:       Exams:  · RLE ROM: Deficits: Unable to assess  · RLE Strength: Deficits: Unable to assess  · LLE ROM: WFL  · LLE Strength: WFL    Functional Mobility:  · Bed Mobility:     · Supine to Sit: maximal  assistance  · Transfers:     · Sit to Stand:  unable to assess with secondary to right LE immobilization  · Gait: No able to assess      Therapeutic Activities and Exercises:    Quad sets  2 x 10, ankle pumps 2 x 10, gluteal sets 2 x 10, foot circles 2 x 10    AM-PAC 6 CLICK MOBILITY  Total Score:9     Patient left supine with all lines intact.    GOALS:   Multidisciplinary Problems     Physical Therapy Goals        Problem: Physical Therapy Goal    Goal Priority Disciplines Outcome Goal Variances Interventions   Physical Therapy Goal     PT, PT/OT                      History:     Past Medical History:   Diagnosis Date    Asthma     Encounter for blood transfusion     Hypertension        Past Surgical History:   Procedure Laterality Date    ARTHROPLASTY, KNEE Right 2019    Performed [facility] at [facility] OR     SECTION      TONSILLECTOMY         Time Tracking:     PT Received On: 19  PT Start Time: 1000     PT Stop Time: 1015  PT Total Time (min): 15 min     Billable Minutes: Evaluation 15      Sanjeev Gustafson, PT  2019

## 2019-09-01 NOTE — ASSESSMENT & PLAN NOTE
Patient's anemia is currently uncontrolled. S/p 1 unit of PRBCs. Etiology likely d/t acute blood loss.  Current CBC reviewed-   Lab Results   Component Value Date    HGB 9.6 (L) 09/01/2019    HCT 30.3 (L) 09/01/2019

## 2019-09-01 NOTE — PLAN OF CARE
Problem: Adult Inpatient Plan of Care  Goal: Plan of Care Review  Outcome: Ongoing (interventions implemented as appropriate)  Pain more satisfactorily controlled this shift with regular doses of pain med.  Drsg to right knee remains free of drainage, is intact with SYD drain and JOSE also in place.  Pedal pulse weak, but palpable.  Appetite fair today, is drinking well of water and iced-tea.  Received one unit PRBCs with no negative side-effects noted or expressed.  Pure-wick working well for good UOP.  Onto fracture pan, passed large soft, formed brown stool.

## 2019-09-01 NOTE — PROGRESS NOTES
[facility] Hospital Medicine  Progress Note    Patient Name: Iris Mueller  MRN: [id number]  Patient Class: IP- Inpatient   Admission Date: 8/29/2019  Length of Stay: 3 days  Attending Physician: Billy Lincoln MD  Primary Care Provider: Elkin You MD        Subjective:     Principal Problem:Septic shock        HPI:  No notes on file    Overview/Hospital Course:  No notes on file    Interval History:  Patient seen and examined.  Patient appears to be improving over the past 24 hr.  Levophed rate is reduced from 0.07 mics per kilos per minute to 0.04 mics per kg and on examination.  Patient states she feels better her pain is better controlled.  Plan of care reviewed with the patient, the nurse, and the patient's daughter at the bedside.    Review of Systems   Constitutional: Positive for fatigue.   Respiratory: Positive for shortness of breath. Negative for cough.    Cardiovascular: Negative for chest pain and leg swelling.   Gastrointestinal: Negative for abdominal pain and nausea.   Musculoskeletal: Positive for gait problem and myalgias.   Neurological: Negative for weakness.   Psychiatric/Behavioral: Negative for confusion.   All other systems reviewed and are negative.    Objective:     Vital Signs (Most Recent):  Temp: 98.2 °F (36.8 °C) (09/01/19 1200)  Pulse: 70 (09/01/19 1400)  Resp: 18 (09/01/19 1400)  BP: 106/68 (09/01/19 1400)  SpO2: 98 % (09/01/19 1400) Vital Signs (24h Range):  Temp:  [97.9 °F (36.6 °C)-98.8 °F (37.1 °C)] 98.2 °F (36.8 °C)  Pulse:  [60-88] 70  Resp:  [15-39] 18  SpO2:  [85 %-100 %] 98 %  BP: ()/(50-74) 106/68     Weight: 129.9 kg (286 lb 6 oz)  Body mass index is 52.38 kg/m².    Intake/Output Summary (Last 24 hours) at 9/1/2019 1447  Last data filed at 9/1/2019 0600  Gross per 24 hour   Intake 1417.72 ml   Output 2352 ml   Net -934.28 ml      Physical Exam   Constitutional: She is oriented to person, place, and time.   Morbidly obese   female in no acute distress   Eyes: Pupils are equal, round, and reactive to light. EOM are normal.   Cardiovascular: Normal rate, regular rhythm and intact distal pulses.   No murmur heard.  Pulmonary/Chest: Effort normal and breath sounds normal.   Abdominal: Soft. She exhibits no distension. There is no tenderness.   Musculoskeletal: She exhibits edema.   Right lower extremity noted with wound in place with JOSE unit and dressing.  Dressing was not taken down.   Neurological: She is alert and oriented to person, place, and time.   Skin: No rash noted. No pallor.   Nursing note and vitals reviewed.      Significant Labs: All pertinent labs within the past 24 hours have been reviewed.    Significant Imaging: I have reviewed all pertinent imaging results/findings within the past 24 hours.      Assessment/Plan:      * Septic shock  This patient does have evidence of infective focus  My overall impression is septic shock.  Source:  Septic joint  Antibiotics given-   Antibiotics (From admission, onward)    Start     Stop Route Frequency Ordered    08/30/19 0100  piperacillin-tazobactam 4.5 g in dextrose 5 % 100 mL IVPB (ready to mix system)      -- IV Every 8 hours (non-standard times) 08/29/19 2006        Latest lactate reviewed-  Recent Labs   Lab 08/30/19  0803 08/30/19  2319   LACTATE 0.7 0.8     Organ dysfunction indicated by Encephalopathy    Shock with decreased perfusion noted, Fluid challenge  was given at 30cc/kg IBW  Post- resuscitation assessment- (Done after fluids given for shock)  Vital signs post fluid administrations were-  Temp Readings from Last 1 Encounters:   09/01/19 98.2 °F (36.8 °C) (Oral)     BP Readings from Last 1 Encounters:   09/01/19 106/68     Pulse Readings from Last 1 Encounters:   09/01/19 70       Perfusion assessment post bolus shows Continues poor peripheral pulses and delayed capillary refill  Will Start Pressors- Levophed for MAP of 65  Source control achieved by:  Irrigation and  removal of infected hematoma on her right knee on 08/30.          Severe sepsis with postop wound infection of right knee joint and right leg cellulitis  Treatment per above with septic shock      Acute blood loss anemia  Patient's anemia is currently uncontrolled. S/p 1 unit of PRBCs. Etiology likely d/t acute blood loss.  Current CBC reviewed-   Lab Results   Component Value Date    HGB 9.6 (L) 09/01/2019    HCT 30.3 (L) 09/01/2019           Infected incision        Fibromyalgia  Continue pain medication regimen.  I would be hesitant to give the patient IV narcotics or expand her chronic regimen secondary to hypertension.  I have discussed this with the patient who understands.      Morbid obesity  Body mass index is 52.38 kg/m². Morbid obesity complicates all aspects of disease management from diagnostic modalities to treatment. Weight loss encouraged and health benefits explained to patient.        Essential hypertension  Blood pressure medication currently on hold secondary to hypotension from septic shock.      Osteoarthritis of right knee  Patient is status post total knee arthroplasty with secondary postop infection.        VTE Risk Mitigation (From admission, onward)        Ordered     IP VTE HIGH RISK PATIENT  Once      08/30/19 1613     Place KAROLINA hose  Until discontinued      08/30/19 1613     Place sequential compression device  Until discontinued      08/30/19 1613          Critical care time spent on the evaluation and treatment of severe organ dysfunction, review of pertinent labs and imaging studies, discussions with consulting providers and discussions with patient/family: 37 minutes.      Billy Lincoln MD  Department of Hospital Medicine   [facility]

## 2019-09-01 NOTE — PROGRESS NOTES
Iris Glasgow Ami [id number] is a 63 y.o. female who had been consulted for vancomycin dosing.    Vancomycin has been discontinued.  Pharmacy consult for vancomycin dosing in no longer required.      Thank you for allowing us to participate in this patient's care.     Jen Lockwood, PharmD

## 2019-09-01 NOTE — ASSESSMENT & PLAN NOTE
This patient does have evidence of infective focus  My overall impression is septic shock.  Source:  Septic joint  Antibiotics given-   Antibiotics (From admission, onward)    Start     Stop Route Frequency Ordered    08/30/19 0100  piperacillin-tazobactam 4.5 g in dextrose 5 % 100 mL IVPB (ready to mix system)      -- IV Every 8 hours (non-standard times) 08/29/19 2006        Latest lactate reviewed-  Recent Labs   Lab 08/30/19  0803 08/30/19  2319   LACTATE 0.7 0.8     Organ dysfunction indicated by Encephalopathy    Shock with decreased perfusion noted, Fluid challenge  was given at 30cc/kg IBW  Post- resuscitation assessment- (Done after fluids given for shock)  Vital signs post fluid administrations were-  Temp Readings from Last 1 Encounters:   09/01/19 98.2 °F (36.8 °C) (Oral)     BP Readings from Last 1 Encounters:   09/01/19 106/68     Pulse Readings from Last 1 Encounters:   09/01/19 70       Perfusion assessment post bolus shows Continues poor peripheral pulses and delayed capillary refill  Will Start Pressors- Levophed for MAP of 65  Source control achieved by:  Irrigation and removal of infected hematoma on her right knee on 08/30.

## 2019-09-01 NOTE — SUBJECTIVE & OBJECTIVE
Interval History:  Patient seen and examined.  Patient appears to be improving over the past 24 hr.  Levophed rate is reduced from 0.07 mics per kilos per minute to 0.04 mics per kg and on examination.  Patient states she feels better her pain is better controlled.  Plan of care reviewed with the patient, the nurse, and the patient's daughter at the bedside.    Review of Systems   Constitutional: Positive for fatigue.   Respiratory: Positive for shortness of breath. Negative for cough.    Cardiovascular: Negative for chest pain and leg swelling.   Gastrointestinal: Negative for abdominal pain and nausea.   Musculoskeletal: Positive for gait problem and myalgias.   Neurological: Negative for weakness.   Psychiatric/Behavioral: Negative for confusion.   All other systems reviewed and are negative.    Objective:     Vital Signs (Most Recent):  Temp: 98.2 °F (36.8 °C) (09/01/19 1200)  Pulse: 70 (09/01/19 1400)  Resp: 18 (09/01/19 1400)  BP: 106/68 (09/01/19 1400)  SpO2: 98 % (09/01/19 1400) Vital Signs (24h Range):  Temp:  [97.9 °F (36.6 °C)-98.8 °F (37.1 °C)] 98.2 °F (36.8 °C)  Pulse:  [60-88] 70  Resp:  [15-39] 18  SpO2:  [85 %-100 %] 98 %  BP: ()/(50-74) 106/68     Weight: 129.9 kg (286 lb 6 oz)  Body mass index is 52.38 kg/m².    Intake/Output Summary (Last 24 hours) at 9/1/2019 1447  Last data filed at 9/1/2019 0600  Gross per 24 hour   Intake 1417.72 ml   Output 2352 ml   Net -934.28 ml      Physical Exam   Constitutional: She is oriented to person, place, and time.   Morbidly obese  female in no acute distress   Eyes: Pupils are equal, round, and reactive to light. EOM are normal.   Cardiovascular: Normal rate, regular rhythm and intact distal pulses.   No murmur heard.  Pulmonary/Chest: Effort normal and breath sounds normal.   Abdominal: Soft. She exhibits no distension. There is no tenderness.   Musculoskeletal: She exhibits edema.   Right lower extremity noted with wound in place with JOSE unit  and dressing.  Dressing was not taken down.   Neurological: She is alert and oriented to person, place, and time.   Skin: No rash noted. No pallor.   Nursing note and vitals reviewed.      Significant Labs: All pertinent labs within the past 24 hours have been reviewed.    Significant Imaging: I have reviewed all pertinent imaging results/findings within the past 24 hours.

## 2019-09-01 NOTE — PLAN OF CARE
Problem: Adult Inpatient Plan of Care  Goal: Plan of Care Review  Outcome: Ongoing (interventions implemented as appropriate)  Patient experienced quiet restful hours through the shift. She states she did not get a very deep sleep but that she dozed all night and that the pain was tolerable. Weaning Levo as tolerated will continue with current plan of care.

## 2019-09-01 NOTE — PROGRESS NOTES
"Progress Note  Infectious Disease    Follow-up For:  Right knee post surgical infection    SUBJECTIVE:     08/31/2019   History of Present Illness: Patient is a 63 y.o. female with past medical history of HTN, asthma, right TKA on 08/14/2019 by Dr. Chung   presented to ED on 08/29/2019 with complaints of bloody and foul-smelling drainage  from right knee incision.     Patient saw the surgeon who referred her to PCP for leg edema, in order to receive lasix. PCP saw her on 08/26/2019 and  Expressed a hematoma was expressed out of the right knee incision manually." Rt knee  felt so much better afterwardst"  Patient was started on oral clindamycin 300 mg 3 times daily.   She did not improve.  Quite the opposite so she came to ER   No systemic symptoms of infection.  Patient denied any associated fever,(she felt warm) No chills, No recent trauma to knee joint.   Patient is started on vancomycin and Zosyn.  I came to see her twice yesterday but the 1st time patient was getting a PICC line and the 2nd time she was in OR.  Overnight she was hypotensive and received 1 L of NS, she was moved to ICU, started on Levophed  She could not receive opiates bc of low blood pressure and states that she cried herself to sleep  She is comfortable at this time    09/01/2019 Right knee pain 7/10, sharp, intermittent on and off, improved with opiates.  Pressors are off. Hopefully she does not need  Any more pressors tonight. Afebrile;  had one BM    Antibiotics (From admission, onward)    Start     Stop Route Frequency Ordered    08/30/19 0100  piperacillin-tazobactam 4.5 g in dextrose 5 % 100 mL IVPB (ready to mix system)      -- IV Every 8 hours (non-standard times) 08/29/19 2006        Antifungals (From admission, onward)    None        Antivirals (From admission, onward)    None            EXAM & DIAGNOSTICS REVIEWED:   Vitals:     Temp:  [97.9 °F (36.6 °C)-98.2 °F (36.8 °C)]   Temp: 98.1 °F (36.7 °C) (09/01/19 1645)  Pulse: 72 " (09/01/19 1645)  Resp: (!) 23 (09/01/19 1645)  BP: 95/67 (09/01/19 1630)  SpO2: 98 % (09/01/19 1645)    Intake/Output Summary (Last 24 hours) at 9/1/2019 1653  Last data filed at 9/1/2019 0600  Gross per 24 hour   Intake 657.72 ml   Output 2352 ml   Net -1694.28 ml       General:  In NAD. Alert and attentive, cooperative, comfortable  Eyes:  Anicteric, PERRL, EOMI  ENT:  No ulcers, exudates, thrush, nares patent, dentition is  Neck:  supple, no masses or adenopathy appreciated  Lungs:  Clear, no consolidation, rales, wheezes, rub  Heart:   SM referred to carotids vs carotid bruit  Abd:  Soft, NT, ND, normal BS, no masses or organomegaly appreciated.  :  purewick, urine clear, no flank tenderness  Musc:  Joints without effusion, swelling,  erythema, synovitis,muscle wasting.   Skin:  No rashes. No palmar or plantar lesions. No subungual petechiae  Wound: Dressing not disturbed as per dr Chung instructions. Expected surrounding bruising; Improved edema of foot and dependent thigh  Neuro:  Alert, attentive, speech fluent, face symmetric, moves all extremities, no focal weakness, ambulatory  Psych:  Calm, cooperative  Extrem: No edema, erythema, phlebitis, cellulitis, warm and well perfused  VAD:  Isolation:     Lines/Tubes/Drains:    General Labs reviewed:  Recent Labs   Lab 08/30/19  2319 08/31/19  0342 09/01/19  1042   WBC 11.73 10.86 10.48   HGB 8.0* 7.6* 9.6*   HCT 25.2* 24.2* 30.3*   * 441* 459*       Recent Labs   Lab 08/29/19  1630  08/30/19  2319 08/31/19  0342 09/01/19  1042   *   < > 130* 134* 137   K 3.9   < > 3.5 3.1* 4.2   CL 96   < > 98 104 104   CO2 26   < > 24 22* 24   BUN 18   < > 14 11 10   CREATININE 1.1   < > 1.0 0.8 0.9   CALCIUM 9.2   < > 8.3* 7.6* 8.7   PROT 6.4  --   --   --  6.0   BILITOT 0.7  --   --   --  0.5   ALKPHOS 94  --   --   --  106   ALT 11  --   --   --  13   AST 11  --   --   --  13    < > = values in this interval not displayed.     Recent Labs   Lab  08/29/19  1630 09/01/19  0325   .5* 138.0*       Micro:   Microbiology Results (last 7 days)     Procedure Component Value Units Date/Time    Aerobic culture [[id number]]  (Abnormal)  (Susceptibility) Collected:  08/29/19 1634    Order Status:  Completed Specimen:  Incision site from Leg, Right Updated:  09/01/19 1223     Aerobic Bacterial Culture PROTEUS MIRABILIS  Many        KLEBSIELLA PNEUMONIAE  Moderate  No other significant isolate      Aerobic culture [[id number]]  (Abnormal) Collected:  08/30/19 1521    Order Status:  Completed Specimen:  Wound from Knee, Right Updated:  09/01/19 1108     Aerobic Bacterial Culture PRESUMPTIVE PROTEUS SPECIES  Few        GRAM NEGATIVE ANJUM  Few  Identification and susceptibility pending      Tissue culture [[id number]]  (Abnormal) Collected:  08/30/19 1521    Order Status:  Completed Specimen:  Wound from Knee, Right Updated:  09/01/19 1103     Aerobic Culture - Tissue PRESUMPTIVE PROTEUS SPECIES  Few  Identification and susceptibility pending       Gram Stain Result Rare WBC's      No organisms seen    Culture, Anaerobe [[id number]] Collected:  08/30/19 1521    Order Status:  Sent Specimen:  Wound from Knee, Right Updated:  08/30/19 2317          Aerobic culture [[id number]] (Abnormal)  Collected: 08/29/19 1634   Order Status: Completed Specimen: Incision site from Leg, Right Updated: 09/01/19 1223    Aerobic Bacterial Culture PROTEUS MIRABILIS   Many   Abnormal      KLEBSIELLA PNEUMONIAE   Moderate   No other significant isolate   Abnormal    Susceptibility      Proteus mirabilis Klebsiella pneumoniae     CULTURE, AEROBIC  (SPECIFY SOURCE) (Preliminary) CULTURE, AEROBIC  (SPECIFY SOURCE) (Preliminary)     Amox/K Clav'ate <=8/4 mcg/mL Sensitive <=8/4 mcg/mL Sensitive     Amp/Sulbactam <=8/4 mcg/mL Sensitive <=8/4 mcg/mL Sensitive     Ampicillin <=8 mcg/mL Sensitive       Cefazolin <=8 mcg/mL Sensitive <=2 mcg/mL Sensitive     Cefepime <=8 mcg/mL Sensitive <=2 mcg/mL  "Sensitive     Ceftriaxone <=8 mcg/mL Sensitive <=1 mcg/mL Sensitive     Ciprofloxacin <=1 mcg/mL Sensitive <=1 mcg/mL Sensitive     Gentamicin <=4 mcg/mL Sensitive <=4 mcg/mL Sensitive     Levofloxacin <=2 mcg/mL Sensitive <=2 mcg/mL Sensitive     Piperacillin/Tazo <=16 mcg/mL Sensitive <=16 mcg/mL Sensitive     Tetracycline   >8 mcg/mL Resistant     Tobramycin <=4 mcg/mL Sensitive <=4 mcg/mL Sensitive     Trimeth/Sulfa <=2/38 mcg/mL Sensitive <=2/38 mcg/mL Sensitive                Imaging Reviewed:   Rt Knee XR   Right knee arthroplasty without immediate complication.   US Rt 08/29/2019  No evidence of deep venous thrombosis in the right lower extremity.   US BL 08/23/2019 No evidence of deep venous thrombosis in either lower extremity.   CXR  No acute findings.  Right PICC line in place  Knee xr   There is right TKA with the orthopedic hardware appearing in place and intact.    IMPRESSION & PLAN      Proteus and Klebsiella Surgical wound infection.  Hopefully there is no knee involvement.   As per surgical report:  " At no point  did we find access to the knee or see any drainage from the knee. "   Septic shock postop, resolved   Leukocytosis improving     ---138. procal 0.08   Expect slow healing bc of multiple comorbidities   Ho COPD, Obesity, HTN, FM, anemia    SM referred to carotids vs carotid bruit. Follow with PCP re carotid US    Recommendations:    Change zosyn to ceftriaxone  Monitor CRP--it is still quite elevated(some elevation is expected post op)        "

## 2019-09-01 NOTE — PLAN OF CARE
08/31/19 1950   Patient Assessment/Suction   Level of Consciousness (AVPU) alert   Respiratory Effort Normal;Unlabored   Expansion/Accessory Muscles/Retractions expansion symmetric;no retractions;no use of accessory muscles   All Lung Fields Breath Sounds clear   PRE-TX-O2   O2 Device (Oxygen Therapy) room air   SpO2 (!) 90 %   Pulse Oximetry Type Continuous   Pulse 88   Resp (!) 33   /73   Aerosol Therapy   $ Aerosol Therapy Charges PRN treatment not required   Respiratory Treatment Status (SVN) PRN treatment not required

## 2019-09-02 LAB
ALBUMIN SERPL BCP-MCNC: 2.2 G/DL (ref 3.5–5.2)
ALP SERPL-CCNC: 93 U/L (ref 55–135)
ALT SERPL W/O P-5'-P-CCNC: 13 U/L (ref 10–44)
ANION GAP SERPL CALC-SCNC: 7 MMOL/L (ref 8–16)
AST SERPL-CCNC: 13 U/L (ref 10–40)
BACTERIA SPEC AEROBE CULT: ABNORMAL
BACTERIA SPEC AEROBE CULT: ABNORMAL
BASOPHILS # BLD AUTO: 0.03 K/UL (ref 0–0.2)
BASOPHILS NFR BLD: 0.3 % (ref 0–1.9)
BILIRUB SERPL-MCNC: 0.4 MG/DL (ref 0.1–1)
BUN SERPL-MCNC: 12 MG/DL (ref 8–23)
CALCIUM SERPL-MCNC: 8.9 MG/DL (ref 8.7–10.5)
CHLORIDE SERPL-SCNC: 101 MMOL/L (ref 95–110)
CO2 SERPL-SCNC: 24 MMOL/L (ref 23–29)
CREAT SERPL-MCNC: 1 MG/DL (ref 0.5–1.4)
CRP SERPL-MCNC: 113.2 MG/L (ref 0–8.2)
DIFFERENTIAL METHOD: ABNORMAL
EOSINOPHIL # BLD AUTO: 0.4 K/UL (ref 0–0.5)
EOSINOPHIL NFR BLD: 3.9 % (ref 0–8)
ERYTHROCYTE [DISTWIDTH] IN BLOOD BY AUTOMATED COUNT: 16.5 % (ref 11.5–14.5)
EST. GFR  (AFRICAN AMERICAN): >60 ML/MIN/1.73 M^2
EST. GFR  (NON AFRICAN AMERICAN): 59 ML/MIN/1.73 M^2
GLUCOSE SERPL-MCNC: 96 MG/DL (ref 70–110)
HCT VFR BLD AUTO: 27.7 % (ref 37–48.5)
HGB BLD-MCNC: 8.8 G/DL (ref 12–16)
IMM GRANULOCYTES # BLD AUTO: 0.06 K/UL (ref 0–0.04)
LYMPHOCYTES # BLD AUTO: 1 K/UL (ref 1–4.8)
LYMPHOCYTES NFR BLD: 9.9 % (ref 18–48)
MAGNESIUM SERPL-MCNC: 1.5 MG/DL (ref 1.6–2.6)
MCH RBC QN AUTO: 30.1 PG (ref 27–31)
MCHC RBC AUTO-ENTMCNC: 31.8 G/DL (ref 32–36)
MCV RBC AUTO: 95 FL (ref 82–98)
MONOCYTES # BLD AUTO: 0.5 K/UL (ref 0.3–1)
MONOCYTES NFR BLD: 5.2 % (ref 4–15)
NEUTROPHILS # BLD AUTO: 8.1 K/UL (ref 1.8–7.7)
NEUTROPHILS NFR BLD: 80.1 % (ref 38–73)
NRBC BLD-RTO: 0 /100 WBC
PHOSPHATE SERPL-MCNC: 3.5 MG/DL (ref 2.7–4.5)
PLATELET # BLD AUTO: 478 K/UL (ref 150–350)
PMV BLD AUTO: 8.4 FL (ref 9.2–12.9)
POTASSIUM SERPL-SCNC: 4.3 MMOL/L (ref 3.5–5.1)
PROT SERPL-MCNC: 5.6 G/DL (ref 6–8.4)
RBC # BLD AUTO: 2.92 M/UL (ref 4–5.4)
SODIUM SERPL-SCNC: 132 MMOL/L (ref 136–145)
WBC # BLD AUTO: 10.06 K/UL (ref 3.9–12.7)

## 2019-09-02 PROCEDURE — 97802 MEDICAL NUTRITION INDIV IN: CPT

## 2019-09-02 PROCEDURE — A4216 STERILE WATER/SALINE, 10 ML: HCPCS | Performed by: INTERNAL MEDICINE

## 2019-09-02 PROCEDURE — 99233 PR SUBSEQUENT HOSPITAL CARE,LEVL III: ICD-10-PCS | Mod: S$GLB,,, | Performed by: INTERNAL MEDICINE

## 2019-09-02 PROCEDURE — 84100 ASSAY OF PHOSPHORUS: CPT

## 2019-09-02 PROCEDURE — 25000003 PHARM REV CODE 250: Performed by: HOSPITALIST

## 2019-09-02 PROCEDURE — 25000003 PHARM REV CODE 250: Performed by: INTERNAL MEDICINE

## 2019-09-02 PROCEDURE — 83735 ASSAY OF MAGNESIUM: CPT

## 2019-09-02 PROCEDURE — 86140 C-REACTIVE PROTEIN: CPT

## 2019-09-02 PROCEDURE — G8978 MOBILITY CURRENT STATUS: HCPCS | Mod: CL | Performed by: PHYSICAL THERAPIST

## 2019-09-02 PROCEDURE — 97162 PT EVAL MOD COMPLEX 30 MIN: CPT | Performed by: PHYSICAL THERAPIST

## 2019-09-02 PROCEDURE — 63600175 PHARM REV CODE 636 W HCPCS: Performed by: INTERNAL MEDICINE

## 2019-09-02 PROCEDURE — 99233 SBSQ HOSP IP/OBS HIGH 50: CPT | Mod: S$GLB,,, | Performed by: INTERNAL MEDICINE

## 2019-09-02 PROCEDURE — 99900035 HC TECH TIME PER 15 MIN (STAT)

## 2019-09-02 PROCEDURE — 94761 N-INVAS EAR/PLS OXIMETRY MLT: CPT

## 2019-09-02 PROCEDURE — 85025 COMPLETE CBC W/AUTO DIFF WBC: CPT

## 2019-09-02 PROCEDURE — 20000000 HC ICU ROOM

## 2019-09-02 PROCEDURE — G8979 MOBILITY GOAL STATUS: HCPCS | Mod: CJ | Performed by: PHYSICAL THERAPIST

## 2019-09-02 PROCEDURE — 97530 THERAPEUTIC ACTIVITIES: CPT | Performed by: PHYSICAL THERAPIST

## 2019-09-02 PROCEDURE — 80053 COMPREHEN METABOLIC PANEL: CPT

## 2019-09-02 RX ORDER — SODIUM CHLORIDE 9 MG/ML
INJECTION, SOLUTION INTRAVENOUS CONTINUOUS
Status: DISCONTINUED | OUTPATIENT
Start: 2019-09-02 | End: 2019-09-05

## 2019-09-02 RX ORDER — MAGNESIUM SULFATE HEPTAHYDRATE 40 MG/ML
2 INJECTION, SOLUTION INTRAVENOUS ONCE
Status: COMPLETED | OUTPATIENT
Start: 2019-09-02 | End: 2019-09-02

## 2019-09-02 RX ORDER — FERROUS SULFATE 325(65) MG
325 TABLET, DELAYED RELEASE (ENTERIC COATED) ORAL 2 TIMES DAILY
Status: DISCONTINUED | OUTPATIENT
Start: 2019-09-02 | End: 2019-09-05 | Stop reason: HOSPADM

## 2019-09-02 RX ORDER — ENOXAPARIN SODIUM 100 MG/ML
40 INJECTION SUBCUTANEOUS EVERY 24 HOURS
Status: DISCONTINUED | OUTPATIENT
Start: 2019-09-02 | End: 2019-09-03

## 2019-09-02 RX ADMIN — PANTOPRAZOLE SODIUM 40 MG: 40 TABLET, DELAYED RELEASE ORAL at 08:09

## 2019-09-02 RX ADMIN — HYDROCODONE BITARTRATE AND ACETAMINOPHEN 1 TABLET: 10; 325 TABLET ORAL at 04:09

## 2019-09-02 RX ADMIN — ENOXAPARIN SODIUM 40 MG: 100 INJECTION SUBCUTANEOUS at 04:09

## 2019-09-02 RX ADMIN — SODIUM CHLORIDE: 0.9 INJECTION, SOLUTION INTRAVENOUS at 12:09

## 2019-09-02 RX ADMIN — OXYCODONE HYDROCHLORIDE 10 MG: 10 TABLET ORAL at 06:09

## 2019-09-02 RX ADMIN — TRAZODONE HYDROCHLORIDE 50 MG: 50 TABLET ORAL at 08:09

## 2019-09-02 RX ADMIN — FLUTICASONE PROPIONATE 50 MCG: 50 SPRAY, METERED NASAL at 08:09

## 2019-09-02 RX ADMIN — IBUPROFEN 600 MG: 600 TABLET ORAL at 12:09

## 2019-09-02 RX ADMIN — IBUPROFEN 600 MG: 600 TABLET ORAL at 06:09

## 2019-09-02 RX ADMIN — ASPIRIN 81 MG: 81 TABLET, COATED ORAL at 08:09

## 2019-09-02 RX ADMIN — MAGNESIUM SULFATE 2 G: 2 INJECTION INTRAVENOUS at 12:09

## 2019-09-02 RX ADMIN — FLUOXETINE 40 MG: 20 CAPSULE ORAL at 08:09

## 2019-09-02 RX ADMIN — Medication 10 ML: at 07:09

## 2019-09-02 RX ADMIN — HYDROCODONE BITARTRATE AND ACETAMINOPHEN 1 TABLET: 10; 325 TABLET ORAL at 08:09

## 2019-09-02 RX ADMIN — LOVASTATIN 20 MG: 20 TABLET ORAL at 08:09

## 2019-09-02 RX ADMIN — FERROUS SULFATE TAB EC 325 MG (65 MG FE EQUIVALENT) 325 MG: 325 (65 FE) TABLET DELAYED RESPONSE at 08:09

## 2019-09-02 RX ADMIN — HYDROCODONE BITARTRATE AND ACETAMINOPHEN 1 TABLET: 10; 325 TABLET ORAL at 12:09

## 2019-09-02 RX ADMIN — Medication 50 MCG: at 08:09

## 2019-09-02 RX ADMIN — HYDROCODONE BITARTRATE AND ACETAMINOPHEN 1 TABLET: 10; 325 TABLET ORAL at 01:09

## 2019-09-02 RX ADMIN — CEFTRIAXONE 2 G: 2 INJECTION, SOLUTION INTRAVENOUS at 06:09

## 2019-09-02 NOTE — NURSING
Attempted to bathe patient 3 times this shift but she refused. Pt stated that she wanted to sleep and she would get one later in the day.

## 2019-09-02 NOTE — PROGRESS NOTES
"Progress Note  Infectious Disease    Follow-up For:  Right knee post surgical infection    SUBJECTIVE:     08/31/2019   History of Present Illness: Patient is a 63 y.o. female with past medical history of HTN, asthma, right TKA on 08/14/2019 by Dr. Chung   presented to ED on 08/29/2019 with complaints of bloody and foul-smelling drainage  from right knee incision.     Patient saw the surgeon who referred her to PCP for leg edema, in order to receive lasix. PCP saw her on 08/26/2019 and  Expressed a hematoma was expressed out of the right knee incision manually." Rt knee  felt so much better afterwardst"  Patient was started on oral clindamycin 300 mg 3 times daily.   She did not improve.  Quite the opposite so she came to ER   No systemic symptoms of infection.  Patient denied any associated fever,(she felt warm) No chills, No recent trauma to knee joint.   Patient is started on vancomycin and Zosyn.  I came to see her twice yesterday but the 1st time patient was getting a PICC line and the 2nd time she was in OR.  Overnight she was hypotensive and received 1 L of NS, she was moved to ICU, started on Levophed  She could not receive opiates bc of low blood pressure and states that she cried herself to sleep  She is comfortable at this time    09/01/2019 Right knee pain 7/10, sharp, intermittent on and off, improved with opiates.  Pressors are off. Hopefully she does not need  Any more pressors tonight. Afebrile;  had one BM    096/02/2019 Afebrile. No pressors. No new complaints, expected right knee pain, 5/10, worse with moving, better with rest and opiates, improved associated swelling.   She would like to have her knee brace and get out of bed as her surgeon had recommended. I explained that we could not get her OOB over the past 2 days bc of need of pressors, but hopefully we can do that today    Antibiotics (From admission, onward)    Start     Stop Route Frequency Ordered    09/01/19 1815  cefTRIAXone " (ROCEPHIN) 2 g in dextrose 5 % 50 mL IVPB      -- IV Every 24 hours (non-standard times) 09/01/19 1700        Antifungals (From admission, onward)    None        Antivirals (From admission, onward)    None            EXAM & DIAGNOSTICS REVIEWED:   Vitals:     Temp:  [97.8 °F (36.6 °C)-98.2 °F (36.8 °C)]   Temp: 97.8 °F (36.6 °C) (09/02/19 0400)  Pulse: 70 (09/02/19 0830)  Resp: 20 (09/02/19 0830)  BP: (!) 85/57 (09/02/19 0830)  SpO2: 99 % (09/02/19 0830)    Intake/Output Summary (Last 24 hours) at 9/2/2019 0851  Last data filed at 9/2/2019 0500  Gross per 24 hour   Intake 367.6 ml   Output 2070 ml   Net -1702.4 ml       General:  In NAD. Alert and attentive, cooperative, comfortable  Eyes:  Anicteric, PERRL, EOMI  ENT:  No ulcers, exudates, thrush, nares patent, dentition is  Neck:  supple, no masses or adenopathy appreciated  Lungs:  Clear, no consolidation, rales, wheezes, rub  Heart:   SM referred to carotids vs carotid bruit,   Abd:  Soft, NT, ND, normal BS, no masses or organomegaly appreciated.  :  purewick, urine clear, no flank tenderness  Musc:  Joints without effusion, swelling,  erythema, synovitis,muscle wasting.   Skin:  No rashes. No palmar or plantar lesions. No subungual petechiae  Wound: Dressing not disturbed as per dr Chung instructions. Expected surrounding bruising; Improved edema of foot and dependent thigh  Neuro:  Alert, attentive, speech fluent, face symmetric, moves all extremities, no focal weakness, ambulatory  Psych:  Calm, cooperative  Extrem: No edema, erythema, phlebitis, cellulitis, warm and well perfused  VAD: RT PICC  Isolation:     Lines/Tubes/Drains:    General Labs reviewed:  Recent Labs   Lab 08/31/19  0342 09/01/19  1042 09/02/19  0350   WBC 10.86 10.48 10.06   HGB 7.6* 9.6* 8.8*   HCT 24.2* 30.3* 27.7*   * 459* 478*       Recent Labs   Lab 08/29/19  1630  08/31/19  0342 09/01/19  1042 09/02/19  0349   *   < > 134* 137 132*   K 3.9   < > 3.1* 4.2 4.3   CL 96    < > 104 104 101   CO2 26   < > 22* 24 24   BUN 18   < > 11 10 12   CREATININE 1.1   < > 0.8 0.9 1.0   CALCIUM 9.2   < > 7.6* 8.7 8.9   PROT 6.4  --   --  6.0 5.6*   BILITOT 0.7  --   --  0.5 0.4   ALKPHOS 94  --   --  106 93   ALT 11  --   --  13 13   AST 11  --   --  13 13    < > = values in this interval not displayed.     Recent Labs   Lab 08/29/19  1630 09/01/19  0325 09/02/19  0349   .5* 138.0* 113.2*       Micro:   Microbiology Results (last 7 days)     Procedure Component Value Units Date/Time    Aerobic culture [[id number]]  (Abnormal)  (Susceptibility) Collected:  08/29/19 1634    Order Status:  Completed Specimen:  Incision site from Leg, Right Updated:  09/01/19 1223     Aerobic Bacterial Culture PROTEUS MIRABILIS  Many        KLEBSIELLA PNEUMONIAE  Moderate  No other significant isolate      Aerobic culture [[id number]]  (Abnormal) Collected:  08/30/19 1521    Order Status:  Completed Specimen:  Wound from Knee, Right Updated:  09/01/19 1108     Aerobic Bacterial Culture PRESUMPTIVE PROTEUS SPECIES  Few        GRAM NEGATIVE ANJUM  Few  Identification and susceptibility pending      Tissue culture [[id number]]  (Abnormal) Collected:  08/30/19 1521    Order Status:  Completed Specimen:  Wound from Knee, Right Updated:  09/01/19 1103     Aerobic Culture - Tissue PRESUMPTIVE PROTEUS SPECIES  Few  Identification and susceptibility pending       Gram Stain Result Rare WBC's      No organisms seen    Culture, Anaerobe [[id number]] Collected:  08/30/19 1521    Order Status:  Sent Specimen:  Wound from Knee, Right Updated:  08/30/19 2317          Aerobic culture [[id number]] (Abnormal)  Collected: 08/29/19 1634   Order Status: Completed Specimen: Incision site from Leg, Right Updated: 09/01/19 1223    Aerobic Bacterial Culture PROTEUS MIRABILIS   Many   Abnormal      KLEBSIELLA PNEUMONIAE   Moderate   No other significant isolate   Abnormal    Susceptibility      Proteus mirabilis Klebsiella pneumoniae      "CULTURE, AEROBIC  (SPECIFY SOURCE) (Preliminary) CULTURE, AEROBIC  (SPECIFY SOURCE) (Preliminary)     Amox/K Clav'ate <=8/4 mcg/mL Sensitive <=8/4 mcg/mL Sensitive     Amp/Sulbactam <=8/4 mcg/mL Sensitive <=8/4 mcg/mL Sensitive     Ampicillin <=8 mcg/mL Sensitive       Cefazolin <=8 mcg/mL Sensitive <=2 mcg/mL Sensitive     Cefepime <=8 mcg/mL Sensitive <=2 mcg/mL Sensitive     Ceftriaxone <=8 mcg/mL Sensitive <=1 mcg/mL Sensitive     Ciprofloxacin <=1 mcg/mL Sensitive <=1 mcg/mL Sensitive     Gentamicin <=4 mcg/mL Sensitive <=4 mcg/mL Sensitive     Levofloxacin <=2 mcg/mL Sensitive <=2 mcg/mL Sensitive     Piperacillin/Tazo <=16 mcg/mL Sensitive <=16 mcg/mL Sensitive     Tetracycline   >8 mcg/mL Resistant     Tobramycin <=4 mcg/mL Sensitive <=4 mcg/mL Sensitive     Trimeth/Sulfa <=2/38 mcg/mL Sensitive <=2/38 mcg/mL Sensitive                Imaging Reviewed:   Rt Knee XR   Right knee arthroplasty without immediate complication.   US Rt 08/29/2019  No evidence of deep venous thrombosis in the right lower extremity.   US BL 08/23/2019 No evidence of deep venous thrombosis in either lower extremity.   CXR 08/31  No acute findings.  Right PICC line in place  Knee xr   There is right TKA with the orthopedic hardware appearing in place and intact.    IMPRESSION & PLAN      Proteus and Klebsiella Surgical wound infection.  Hopefully there is no knee involvement.   As per surgical report 08/30 :  " At no point  did we find access to the knee or see any drainage from the knee. "  My only concern at this time is that CRP is not improving fast enough   Septic shock postop, resolved   Leukocytosis improved    ---138. procal 0.08   Expect slow healing bc of multiple comorbidities   Ho COPD, Obesity, HTN, FM, anemia    SM referred to carotids vs carotid bruit. Follow with PCP re carotid US   Smoker advised against   All dw daughter at bedside. Stable now, but high risk for life threatening deterioration, due to multiple " co-morbidities and need for iv.    Recommendations:    Ceftriaxone d2  Monitor CRP--it is still quite elevated(some elevation is expected post op, but it is improving slow)

## 2019-09-02 NOTE — CARE UPDATE
09/02/19 0608   Patient Assessment/Suction   Level of Consciousness (AVPU) alert   PRE-TX-O2   O2 Device (Oxygen Therapy) room air   SpO2 98 %   Pulse Oximetry Type Continuous   $ Pulse Oximetry - Multiple Charge Pulse Oximetry - Multiple   Pulse 72   Resp 18   Aerosol Therapy   $ Aerosol Therapy Charges PRN treatment not required

## 2019-09-02 NOTE — PLAN OF CARE
Problem: Physical Therapy Goal  Goal: Physical Therapy Goal  Goals to be met by: 2019     Patient will increase functional independence with mobility by performin. Supine to sit with Contact Guard Assistance  2. Sit to supine with Contact Guard Assistance  3. Sit to stand transfer with Stand-by Assistance  4. Bed to chair transfer with Contact Guard Assistance using Rolling Walker  5. Gait  x 150 feet with Contact Guard Assistance using Rolling Walker.   6. Lower extremity exercise program x10-20 reps per handout, with independence     Outcome: Ongoing (interventions implemented as appropriate)  Pt was able to transfer bed<>chair with RW and Min A +2 person with ANNA pride.

## 2019-09-02 NOTE — PLAN OF CARE
09/01/19 2027   Patient Assessment/Suction   Level of Consciousness (AVPU) alert   Respiratory Effort Normal;Unlabored   Expansion/Accessory Muscles/Retractions expansion symmetric;no retractions;no use of accessory muscles   All Lung Fields Breath Sounds clear   PRE-TX-O2   O2 Device (Oxygen Therapy) room air   SpO2 97 %   Pulse Oximetry Type Continuous   Pulse 74   Resp (!) 29   Aerosol Therapy   $ Aerosol Therapy Charges PRN treatment not required   Respiratory Treatment Status (SVN) PRN treatment not required

## 2019-09-02 NOTE — PROGRESS NOTES
"[facility]  Adult Nutrition  Progress Note    SUMMARY    Intervention: general healthful diet and nutrition supplement therapy-commercial beverage     Recommendation:  1) Continue diet as ordered + Boost glucose control (lower calorie than Plus) 1-2 x daily whil pt with decreased appetite   2)once PO intakes improve d/c Boost ( change to beneprotein) and add sodium restriction   3) Weigh pt weekly   4) Nutrition education for obesity at f/u- per chart review pt has not lost weight    Goals: 1) po intakes > 50% of meals and supplements at f/u   Nutrition Goal Status: new  Communication of RD Recs: (POC, sticky note, reviewed with RN)    Reason for Assessment    Reason For Assessment: identified at risk by screening criteria  Diagnosis: (septick shock)  Relevant Medical History: asthma, HTN, R. TKA 8/14/19  Interdisciplinary Rounds: (none today)    General Information Comments: 64 y/o female admitted with septick shock after knee sx. 3 weeks ago. In a lot of pain, deferred education today. Pt claims to have lost 30 lb since sx., not supported by chart review, shows wt. gain. Pt ate with a fair appetite today. NFPE done 9/2/19, no wasting seen pt appears obese.     Nutrition Discharge Planning: To be determined- Low sodium diet, 1500  kcal for wt loss    Nutrition Risk Screen    Nutrition Risk Screen: large or nonhealing wound, burn or pressure injury    Nutrition/Diet History    Patient Reported Diet/Restrictions/Preferences: general  Spiritual, Cultural Beliefs, Adventism Practices, Values that Affect Care: no  Food Allergies: NKFA(drinks 2% milk)  Factors Affecting Nutritional Intake: decreased appetite, pain    Anthropometrics    Temp: 97.8 °F (36.6 °C)  Height Method: Measured  Height: 5' 2"  Height (inches): 62 in  Weight Method: Bed Scale  Weight: 129.9 kg (286 lb 6 oz)  Weight (lb): 286.38 lb  Ideal Body Weight (IBW), Female: 110 lb  % Ideal Body Weight, Female (lb): 260.35 lb  BMI " (Calculated): 52.5  BMI Grade: greater than 40 - morbid obesity  Usual Body Weight (UBW), k.3 kg(19)  Weight Change Amount: (120.2 kg 19)  % Usual Body Weight: 109.11  % Weight Change From Usual Weight: 8.88 %       Lab/Procedures/Meds    Pertinent Labs Reviewed: reviewed  BMP  Lab Results   Component Value Date     (L) 2019    K 4.3 2019     2019    CO2 24 2019    BUN 12 2019    CREATININE 1.0 2019    CALCIUM 8.9 2019    ANIONGAP 7 (L) 2019    ESTGFRAFRICA >60 2019    EGFRNONAA 59 (A) 2019     Lab Results   Component Value Date    .2 (H) 2019     Lab Results   Component Value Date    ALBUMIN 2.2 (L) 2019   Albumin likely inaccurate as CRP elevated  Mg 1.5 mg/dl  Lab Results   Component Value Date    IRON 15 (L) 2019    TIBC 339 2019       Pertinent Medications Reviewed: reviewed  Pertinent Medications Comments: B12, iron, statin, KCl    Estimated/Assessed Needs    Weight Used For Calorie Calculations: 129.9 kg (286 lb 6 oz)  Energy Calorie Requirements (kcal): [place] Jeor ( no activity factor obesity) = 1780 kcal  Energy Need Method: [place]-St Jeor  Protein Requirements: 1.0 g protein/kg ( healing of knee incision/age vs. obesity) = 130 g protein  Weight Used For Protein Calculations: 129.9 kg (286 lb 6 oz)  Fluid Requirements (mL): 1800 ml  Estimated Fluid Requirement Method: RDA Method  CHO Requirement: N/A      Nutrition Prescription Ordered    Current Diet Order: Regular    Evaluation of Received Nutrient/Fluid Intake    Energy Calories Required: not meeting needs  Protein Required: not meeting needs  Fluid Required: meeting needs  Tolerance: tolerating  % Intake of Estimated Energy Needs: 0-> 100% ( most 35-65%)  % Meal Intake: Variable 0-100% most (25-50% x 4 days)    Nutrition Risk    Level of Risk/Frequency of Follow-up: moderate 2 x weekly until appetite improves    Assessment and  Plan    Morbid obesity  Contributing Nutrition Diagnosis  Obesity Stage 3    Related to (etiology):   Excessive energy intakes PTA    Signs and Symptoms (as evidenced by):   BMI > 40 kg/m2    Interventions/Recommendations (treatment strategy):  Above    Nutrition Diagnosis Status:   New           Monitor and Evaluation    Food and Nutrient Intake: energy intake, food and beverage intake  Food and Nutrient Adminstration: diet order  Anthropometric Measurements: weight  Biochemical Data, Medical Tests and Procedures: electrolyte and renal panel  Nutrition-Focused Physical Findings: overall appearance, skin     Malnutrition Assessment     Skin (Micronutrient): (Celestine = 18, knee incision infected)  Teeth (Micronutrient): (dental appliance)   Micronutrient Evaluation: suspected deficiency  Micronutrient Evaluation Comments: Mg, Na   Weight Loss (Malnutrition): (none)           Edema (Fluid Accumulation): 2-->mild(2-3+)   Subcutaneous Fat Loss (Final Summary): well nourished  Muscle Loss Evaluation (Final Summary): well nourished         Nutrition Follow-Up    RD Follow-up?: Yes

## 2019-09-02 NOTE — PLAN OF CARE
Problem: Adult Inpatient Plan of Care  Goal: Plan of Care Review  Outcome: Ongoing (interventions implemented as appropriate)  Quiet hours through the shift with no sign of acute distress noted. Pt continues to complain of pain in her Knee that is somewhat relieved by her medication. Pt is unable to move leg at all without 10/10 pain. Levo has been off since 09/01 @ 2100. Good urine output with pure wick. Will continue with current plan of care.

## 2019-09-02 NOTE — ASSESSMENT & PLAN NOTE
Contributing Nutrition Diagnosis  Obesity Stage 3    Related to (etiology):   Excessive energy intakes PTA    Signs and Symptoms (as evidenced by):   BMI > 40 kg/m2    Interventions/Recommendations (treatment strategy):  Above    Nutrition Diagnosis Status:   New

## 2019-09-02 NOTE — PROGRESS NOTES
Progress Note  Hospital Medicine  Patient Name:Iris Mueller  MRN:  [id number]  Patient Class: IP- Inpatient  Admit Date: 8/29/2019  Length of Stay: 4 days  Expected Discharge Date:   Attending Physician: Yazmin Jules MD  Primary Care Provider:  Elkin You MD    SUBJECTIVE:     Principal Problem: Septic shock  Initial history of present illness: Patient is a 63 y.o. female admitted to Hospitalist Service from [facility] Emergency Room with complaint of drainage from right knee incision. Patient reportedly has past medical history significant for hypertension and bronchial asthma. Patient underwent right total knee arthroplasty performed by Dr. Chung on August 14, 2019.  Patient presented to the emergency room with complaint of bloody drainage which is foul smelling from right knee incision.  Patient reports she was seen by primary care physician on August 26, 2019 when a hematoma was expressed out of the right knee incision manually.  Patient was started on oral clindamycin 300 mg 3 times daily.  Patient denied any associated fever, chills or any recent trauma to knee joint. Patient denied chest pain, shortness of breath, abdominal pain, nausea, vomiting, headache, vision changes, focal neuro-deficits, cough or fever.    PMH/PSH/SH/FH/Meds: reviewed.    Symptoms/Review of Systems:  Afebrile.  Patient transferred to intensive care unit due to hypotension/.  Septic shock management.  Blood pressure continues to be low.  Off IV pressor agent.  No shortness of breath, cough, chest pain or headache, fever or abdominal pain.     Diet:  Adequate intake.    Activity level:  Up with assist    Pain:  Patient reports no pain.       OBJECTIVE:   Vital Signs (Most Recent):      Temp: 97.8 °F (36.6 °C) (09/02/19 0400)  Pulse: 70 (09/02/19 0830)  Resp: 20 (09/02/19 0830)  BP: (!) 85/57 (09/02/19 0830)  SpO2: 99 % (09/02/19 0830)       Vital Signs Range (Last 24H):  Temp:  [97.8 °F (36.6 °C)-98.2 °F  (36.8 °C)]   Pulse:  [63-85]   Resp:  [14-33]   BP: ()/(47-74)   SpO2:  [86 %-100 %]     Weight: 129.9 kg (286 lb 6 oz)  Body mass index is 52.38 kg/m².    Intake/Output Summary (Last 24 hours) at 9/2/2019 0914  Last data filed at 9/2/2019 0500  Gross per 24 hour   Intake 367.6 ml   Output 2070 ml   Net -1702.4 ml     Physical Examination:  General appearance: well developed, appears stated age  Head: normocephalic, atraumatic  Eyes:  conjunctivae/corneas clear. PERRL.  Nose: Nares normal. Septum midline.  Throat: lips, mucosa, and tongue normal; teeth and gums normal, no throat erythema.  Neck: supple, symmetrical, trachea midline, no JVD and thyroid not enlarged, symmetric, no tenderness/mass/nodules  Lungs:  clear to auscultation bilaterally and normal respiratory effort  Chest wall: no tenderness  Heart: regular rate and rhythm, S1, S2 normal, no murmur, click, rub or gallop  Abdomen: soft, non-tender non-distended; bowel sounds normal; no masses,  no organomegaly  Extremities: no cyanosis, clubbing or edema.  right knee dressing clean dry and intact. A SYD drain is in place at upper 1/3 anterior medial shin with receding cellulitis of leg.  Knee joint is with erythema, increased warmth and hypersensitivity.  No obvious fluctuation present.  Pulses: 2+ and symmetric  Skin: Skin color, texture, turgor normal. No rashes or lesions.  Lymph nodes: Cervical, supraclavicular, and axillary nodes normal.  Neurologic: Normal strength and tone. No focal numbness or weakness. CNII-XII intact.      CBC:  Recent Labs   Lab 08/31/19  0342 09/01/19  1042 09/02/19  0350   WBC 10.86 10.48 10.06   RBC 2.43* 3.13* 2.92*   HGB 7.6* 9.6* 8.8*   HCT 24.2* 30.3* 27.7*   * 459* 478*   * 97 95   MCH 31.3* 30.7 30.1   MCHC 31.4* 31.7* 31.8*   BMP  Recent Labs   Lab 08/31/19  0342 09/01/19  1042 09/02/19  0349   * 116* 96   * 137 132*   K 3.1* 4.2 4.3    104 101   CO2 22* 24 24   BUN 11 10 12    CREATININE 0.8 0.9 1.0   CALCIUM 7.6* 8.7 8.9   MG  --  1.7 1.5*      Diagnostic Results:  Microbiology Results (last 7 days)     Procedure Component Value Units Date/Time    Aerobic culture [[id number]]  (Abnormal)  (Susceptibility) Collected:  08/29/19 1634    Order Status:  Completed Specimen:  Incision site from Leg, Right Updated:  09/01/19 1223     Aerobic Bacterial Culture PROTEUS MIRABILIS  Many        KLEBSIELLA PNEUMONIAE  Moderate  No other significant isolate      Aerobic culture [[id number]]  (Abnormal) Collected:  08/30/19 1521    Order Status:  Completed Specimen:  Wound from Knee, Right Updated:  09/01/19 1108     Aerobic Bacterial Culture PRESUMPTIVE PROTEUS SPECIES  Few        GRAM NEGATIVE ANJUM  Few  Identification and susceptibility pending      Tissue culture [[id number]]  (Abnormal) Collected:  08/30/19 1521    Order Status:  Completed Specimen:  Wound from Knee, Right Updated:  09/01/19 1103     Aerobic Culture - Tissue PRESUMPTIVE PROTEUS SPECIES  Few  Identification and susceptibility pending       Gram Stain Result Rare WBC's      No organisms seen    Culture, Anaerobe [[id number]] Collected:  08/30/19 1521    Order Status:  Sent Specimen:  Wound from Knee, Right Updated:  08/30/19 2317         RLE venous doppler scan; No evidence of deep venous thrombosis in the right lower extremity.    CXR: No acute findings.  Right PICC line in place.    Right knee x-ray: There is right TKA with the orthopedic hardware appearing in place and intact.    Assessment/Plan:      Severe Sepsis with post-op right knee wound infection status post incision and drainage  Postop wound infection of right knee joint and right leg cellulitis due to Proteus Mirabilis and Klebsiella sp  Osteoarthritis of right knee status post right total knee arthroplasty (August 14, 2019)  Follow Dr. Chung recommendations. Follow microbiology-wound culture results.  Follow ID recommendations.  Continue intravenous Ceftriaxone  antibiotic therapy.   Pain control with oral narcotics.  Continue physical therapy.  Observe fall precautions. Continue RLE elevation.  CRP is still elevated at 113.     * Septic shock  This patient does have evidence of infective focus  My overall impression is septic shock.  Source:  Septic joint  Antibiotics given- IV Rocephin.  Latest lactate reviewed-       Recent Labs   Lab 08/30/19  0803 08/30/19  2319   LACTATE 0.7 0.8      Organ dysfunction indicated by Encephalopathy     Shock with decreased perfusion noted, Fluid challenge  was given at 30cc/kg IBW  Post- resuscitation assessment- (Done after fluids given for shock)  Vitals:    09/02/19 0830   BP: (!) 85/57   Pulse: 70   Resp: 20   Temp:      Perfusion assessment post bolus shows Continues poor peripheral pulses and delayed capillary refill  Will Start Pressors- Levophed for MAP of 65.  Start IV fluid hydration.  Source control achieved by:  Irrigation and removal of infected hematoma on her right knee on 08/30.       Yes    Essential hypertension [I10]  Chronic problem. Will continue chronic medications and monitor for any changes, adjusting as needed.      Yes    Fibromyalgia [M79.7]  As above.      Yes    Morbid obesity [E66.01]  Body mass index is 48.47 kg/m². Morbid obesity complicates all aspects of disease management from diagnostic modalities to treatment. Weight loss encouraged and health benefits explained to patient.   Yes        Acute blood loss anemia/iron deficiency anemia  Follow CBC and transfuse as needed.  Patient would benefit with iron supplementation upon discharge. Increase iron supplementation twice daily.    Hypomagnesemia  Replete magnesium, follow magnesium level daily.          DVT prophylaxis:  Use sequential compression stockings and Brennan hose to left leg.  Resume anticoagulation therapy in a.m. after surgical intervention     Discussed with multiple family members, answered all questions.     VTE Risk Mitigation (From  admission, onward)        Ordered     enoxaparin injection 40 mg  Daily      09/02/19 1104     IP VTE HIGH RISK PATIENT  Once      08/30/19 1613     Place KAROLINA hose  Until discontinued      08/30/19 1613     Place sequential compression device  Until discontinued      08/30/19 1613        Yazmin Jules MD  Department of Hospital Medicine   [facility]

## 2019-09-02 NOTE — PLAN OF CARE
Problem: Adult Inpatient Plan of Care  Goal: Patient-Specific Goal (Individualization)  Intervention: general healthful diet and nutrition supplement therapy-commercial beverage      Recommendation:  1) Continue diet as ordered + Boost glucose control ( lower kcal than plus) 1-2 x daily whil pt with decreased appetite   2)once PO intakes improve d/c Boost ( change to beneprotein) and add sodium restriction   3) Weigh pt weekly   4) Nutrition education for obesity at f/u- per chart review pt has not lost weight     Goals: 1) po intakes > 50% of meals and supplements at f/u   Nutrition Goal Status: new  Communication of RD Recs: (POC, sticky note, reviewed with RN)

## 2019-09-03 ENCOUNTER — OUTSIDE PLACE OF SERVICE (OUTPATIENT)
Dept: INFECTIOUS DISEASES | Facility: CLINIC | Age: 64
End: 2019-09-03
Payer: MEDICARE

## 2019-09-03 LAB
ALBUMIN SERPL BCP-MCNC: 2.3 G/DL (ref 3.5–5.2)
ALP SERPL-CCNC: 111 U/L (ref 55–135)
ALT SERPL W/O P-5'-P-CCNC: 13 U/L (ref 10–44)
ANION GAP SERPL CALC-SCNC: 8 MMOL/L (ref 8–16)
AST SERPL-CCNC: 11 U/L (ref 10–40)
BACTERIA THROAT CULT: ABNORMAL
BACTERIA THROAT CULT: ABNORMAL
BASOPHILS # BLD AUTO: 0.02 K/UL (ref 0–0.2)
BASOPHILS NFR BLD: 0.2 % (ref 0–1.9)
BILIRUB SERPL-MCNC: 0.3 MG/DL (ref 0.1–1)
BUN SERPL-MCNC: 10 MG/DL (ref 8–23)
CALCIUM SERPL-MCNC: 9.3 MG/DL (ref 8.7–10.5)
CHLORIDE SERPL-SCNC: 100 MMOL/L (ref 95–110)
CO2 SERPL-SCNC: 24 MMOL/L (ref 23–29)
CREAT SERPL-MCNC: 0.8 MG/DL (ref 0.5–1.4)
CRP SERPL-MCNC: 103.4 MG/L (ref 0–8.2)
DIFFERENTIAL METHOD: ABNORMAL
EOSINOPHIL # BLD AUTO: 0.3 K/UL (ref 0–0.5)
EOSINOPHIL NFR BLD: 3 % (ref 0–8)
ERYTHROCYTE [DISTWIDTH] IN BLOOD BY AUTOMATED COUNT: 16.1 % (ref 11.5–14.5)
EST. GFR  (AFRICAN AMERICAN): >60 ML/MIN/1.73 M^2
EST. GFR  (NON AFRICAN AMERICAN): >60 ML/MIN/1.73 M^2
GLUCOSE SERPL-MCNC: 93 MG/DL (ref 70–110)
GRAM STN SPEC: ABNORMAL
GRAM STN SPEC: ABNORMAL
HCT VFR BLD AUTO: 28.5 % (ref 37–48.5)
HGB BLD-MCNC: 9 G/DL (ref 12–16)
IMM GRANULOCYTES # BLD AUTO: 0.04 K/UL (ref 0–0.04)
LYMPHOCYTES # BLD AUTO: 1.3 K/UL (ref 1–4.8)
LYMPHOCYTES NFR BLD: 14.7 % (ref 18–48)
MAGNESIUM SERPL-MCNC: 1.8 MG/DL (ref 1.6–2.6)
MCH RBC QN AUTO: 30.3 PG (ref 27–31)
MCHC RBC AUTO-ENTMCNC: 31.6 G/DL (ref 32–36)
MCV RBC AUTO: 96 FL (ref 82–98)
MONOCYTES # BLD AUTO: 0.6 K/UL (ref 0.3–1)
MONOCYTES NFR BLD: 6.7 % (ref 4–15)
NEUTROPHILS # BLD AUTO: 6.7 K/UL (ref 1.8–7.7)
NEUTROPHILS NFR BLD: 75 % (ref 38–73)
NRBC BLD-RTO: 0 /100 WBC
PHOSPHATE SERPL-MCNC: 3.7 MG/DL (ref 2.7–4.5)
PLATELET # BLD AUTO: 525 K/UL (ref 150–350)
PMV BLD AUTO: 8.4 FL (ref 9.2–12.9)
POTASSIUM SERPL-SCNC: 4.3 MMOL/L (ref 3.5–5.1)
PROT SERPL-MCNC: 5.9 G/DL (ref 6–8.4)
RBC # BLD AUTO: 2.97 M/UL (ref 4–5.4)
SODIUM SERPL-SCNC: 132 MMOL/L (ref 136–145)
WBC # BLD AUTO: 8.91 K/UL (ref 3.9–12.7)

## 2019-09-03 PROCEDURE — 97110 THERAPEUTIC EXERCISES: CPT

## 2019-09-03 PROCEDURE — 83735 ASSAY OF MAGNESIUM: CPT

## 2019-09-03 PROCEDURE — 97530 THERAPEUTIC ACTIVITIES: CPT | Performed by: PHYSICAL THERAPIST

## 2019-09-03 PROCEDURE — 63600175 PHARM REV CODE 636 W HCPCS: Performed by: INTERNAL MEDICINE

## 2019-09-03 PROCEDURE — 99232 SBSQ HOSP IP/OBS MODERATE 35: CPT | Mod: S$GLB,,, | Performed by: INTERNAL MEDICINE

## 2019-09-03 PROCEDURE — 99024 PR POST-OP FOLLOW-UP VISIT: ICD-10-PCS | Mod: ,,, | Performed by: ORTHOPAEDIC SURGERY

## 2019-09-03 PROCEDURE — 99232 PR SUBSEQUENT HOSPITAL CARE,LEVL II: ICD-10-PCS | Mod: S$GLB,,, | Performed by: INTERNAL MEDICINE

## 2019-09-03 PROCEDURE — 25000003 PHARM REV CODE 250: Performed by: INTERNAL MEDICINE

## 2019-09-03 PROCEDURE — 12000002 HC ACUTE/MED SURGE SEMI-PRIVATE ROOM

## 2019-09-03 PROCEDURE — A4216 STERILE WATER/SALINE, 10 ML: HCPCS | Performed by: INTERNAL MEDICINE

## 2019-09-03 PROCEDURE — 99024 POSTOP FOLLOW-UP VISIT: CPT | Mod: ,,, | Performed by: ORTHOPAEDIC SURGERY

## 2019-09-03 PROCEDURE — G8987 SELF CARE CURRENT STATUS: HCPCS | Mod: CK

## 2019-09-03 PROCEDURE — 97535 SELF CARE MNGMENT TRAINING: CPT

## 2019-09-03 PROCEDURE — 94761 N-INVAS EAR/PLS OXIMETRY MLT: CPT

## 2019-09-03 PROCEDURE — 97116 GAIT TRAINING THERAPY: CPT | Performed by: PHYSICAL THERAPIST

## 2019-09-03 PROCEDURE — 25000003 PHARM REV CODE 250: Performed by: HOSPITALIST

## 2019-09-03 PROCEDURE — G8988 SELF CARE GOAL STATUS: HCPCS | Mod: CK

## 2019-09-03 PROCEDURE — 84100 ASSAY OF PHOSPHORUS: CPT

## 2019-09-03 PROCEDURE — 80053 COMPREHEN METABOLIC PANEL: CPT

## 2019-09-03 PROCEDURE — 85025 COMPLETE CBC W/AUTO DIFF WBC: CPT

## 2019-09-03 PROCEDURE — 86140 C-REACTIVE PROTEIN: CPT

## 2019-09-03 PROCEDURE — 97166 OT EVAL MOD COMPLEX 45 MIN: CPT

## 2019-09-03 PROCEDURE — 99900035 HC TECH TIME PER 15 MIN (STAT)

## 2019-09-03 RX ORDER — ENOXAPARIN SODIUM 100 MG/ML
40 INJECTION SUBCUTANEOUS EVERY 12 HOURS
Status: DISCONTINUED | OUTPATIENT
Start: 2019-09-03 | End: 2019-09-05 | Stop reason: HOSPADM

## 2019-09-03 RX ORDER — L. ACIDOPHILUS/L.BULGARICUS 100MM CELL
1 GRANULES IN PACKET (EA) ORAL 2 TIMES DAILY
Status: DISCONTINUED | OUTPATIENT
Start: 2019-09-03 | End: 2019-09-05 | Stop reason: HOSPADM

## 2019-09-03 RX ADMIN — ENOXAPARIN SODIUM 40 MG: 100 INJECTION SUBCUTANEOUS at 09:09

## 2019-09-03 RX ADMIN — HYDROCODONE BITARTRATE AND ACETAMINOPHEN 1 TABLET: 10; 325 TABLET ORAL at 01:09

## 2019-09-03 RX ADMIN — LACTOBACILLUS ACIDOPHILUS / LACTOBACILLUS BULGARICUS 1 EACH: 100 MILLION CFU STRENGTH GRANULES at 09:09

## 2019-09-03 RX ADMIN — Medication 10 ML: at 05:09

## 2019-09-03 RX ADMIN — OXYCODONE HYDROCHLORIDE 10 MG: 10 TABLET ORAL at 09:09

## 2019-09-03 RX ADMIN — LOVASTATIN 20 MG: 20 TABLET ORAL at 09:09

## 2019-09-03 RX ADMIN — SODIUM CHLORIDE: 0.9 INJECTION, SOLUTION INTRAVENOUS at 08:09

## 2019-09-03 RX ADMIN — FLUOXETINE 40 MG: 20 CAPSULE ORAL at 08:09

## 2019-09-03 RX ADMIN — HYDROCODONE BITARTRATE AND ACETAMINOPHEN 1 TABLET: 10; 325 TABLET ORAL at 11:09

## 2019-09-03 RX ADMIN — ASPIRIN 81 MG: 81 TABLET, COATED ORAL at 08:09

## 2019-09-03 RX ADMIN — PANTOPRAZOLE SODIUM 40 MG: 40 TABLET, DELAYED RELEASE ORAL at 08:09

## 2019-09-03 RX ADMIN — HYDROCODONE BITARTRATE AND ACETAMINOPHEN 1 TABLET: 10; 325 TABLET ORAL at 06:09

## 2019-09-03 RX ADMIN — HYDROCODONE BITARTRATE AND ACETAMINOPHEN 1 TABLET: 10; 325 TABLET ORAL at 03:09

## 2019-09-03 RX ADMIN — ASPIRIN 81 MG: 81 TABLET, COATED ORAL at 09:09

## 2019-09-03 RX ADMIN — ENOXAPARIN SODIUM 40 MG: 100 INJECTION SUBCUTANEOUS at 10:09

## 2019-09-03 RX ADMIN — Medication 10 ML: at 11:09

## 2019-09-03 RX ADMIN — TRAZODONE HYDROCHLORIDE 50 MG: 50 TABLET ORAL at 09:09

## 2019-09-03 RX ADMIN — FLUTICASONE PROPIONATE 50 MCG: 50 SPRAY, METERED NASAL at 09:09

## 2019-09-03 RX ADMIN — Medication 10 ML: at 12:09

## 2019-09-03 RX ADMIN — FERROUS SULFATE TAB EC 325 MG (65 MG FE EQUIVALENT) 325 MG: 325 (65 FE) TABLET DELAYED RESPONSE at 09:09

## 2019-09-03 RX ADMIN — CEFTRIAXONE 2 G: 2 INJECTION, SOLUTION INTRAVENOUS at 05:09

## 2019-09-03 RX ADMIN — FERROUS SULFATE TAB EC 325 MG (65 MG FE EQUIVALENT) 325 MG: 325 (65 FE) TABLET DELAYED RESPONSE at 08:09

## 2019-09-03 RX ADMIN — HYDROCODONE BITARTRATE AND ACETAMINOPHEN 1 TABLET: 10; 325 TABLET ORAL at 07:09

## 2019-09-03 NOTE — PT/OT/SLP PROGRESS
Physical Therapy Treatment    Patient Name:  Iris Mueller   MRN:  [id number]    Recommendations:     Discharge Recommendations:  home health PT, outpatient PT   Discharge Equipment Recommendations: none   Barriers to discharge: None    Assessment:     Iris uMeller is a 63 y.o. female admitted with a medical diagnosis of Septic shock.  She presents with the following impairments/functional limitations:  weakness, gait instability, impaired endurance, impaired balance, decreased lower extremity function, impaired self care skills, pain, impaired skin, orthopedic precautions, impaired functional mobilty, edema, impaired cardiopulmonary response to activity, decreased ROM.  During PT tx, pt was able to perform supine exercises.  She then required CGA for supine<>sit with HOB raised, and then CGA from ICU bed height with R KI on.  She then ambulated 20ft with RW, R KI on and Min A with chair follow.      Rehab Prognosis: Good; patient would benefit from acute skilled PT services to address these deficits and reach maximum level of function.    Recent Surgery: Procedure(s) (LRB):  INCISION AND DRAINAGE, LOWER EXTREMITY (Right) 4 Days Post-Op    Plan:     During this hospitalization, patient to be seen daily to address the identified rehab impairments via gait training, therapeutic activities, therapeutic exercises and progress toward the following goals:    · Plan of Care Expires:  09/16/19    Subjective     Chief Complaint: Pt expressed it was very difficult to get off the BSC yesterday with the KI on.  Discussed with pt and nurse Swain about having her R LE propped on a chair when on the BSC next time.  Patient Goal: Pt wants to be able to walk again.   · Pain Rating 1: 4/10  · Location - Side 1: Right  · Location 1: leg      Objective:     Communicated with nurse Swain prior to session.  Patient found HOB elevated with PureWick, peripheral IV, wound vac, SYD drain, telemetry, knee immobilizer upon  PT entry to room.     General Precautions: Standard, fall   Orthopedic Precautions:RLE weight bearing as tolerated   Braces: Knee immobilizer     Functional Mobility:  · Bed Mobility:     · Supine to Sit: contact guard assistance and HOB raised (Pt used to sleeping in a relciner at home)  · Transfers:     · Sit to Stand:  contact guard assistance with rolling walker and R KI on, when standing from ICU bed.   · Gait: 20ft with RW and Min A with chair follow  · Balance: fair      AM-PAC 6 CLICK MOBILITY  Turning over in bed (including adjusting bedclothes, sheets and blankets)?: 3  Sitting down on and standing up from a chair with arms (e.g., wheelchair, bedside commode, etc.): 3  Moving from lying on back to sitting on the side of the bed?: 3  Moving to and from a bed to a chair (including a wheelchair)?: 3  Need to walk in hospital room?: 3  Climbing 3-5 steps with a railing?: 2  Basic Mobility Total Score: 17       Therapeutic Activities and Exercises:   Supine AP's, GS, QS all x 10 reps bilaterally.  Pt assisted with donning R KI while in bed.     Patient left up in chair with all lines intact, call button in reach, chair alarm on and nurse Brynn notified..    GOALS:   Multidisciplinary Problems     Physical Therapy Goals        Problem: Physical Therapy Goal    Goal Priority Disciplines Outcome Goal Variances Interventions   Physical Therapy Goal     PT, PT/OT Ongoing (interventions implemented as appropriate)     Description:  Goals to be met by: 2019     Patient will increase functional independence with mobility by performin. Supine to sit with Contact Guard Assistance  2. Sit to supine with Contact Guard Assistance  3. Sit to stand transfer with Stand-by Assistance  4. Bed to chair transfer with Contact Guard Assistance using Rolling Walker  5. Gait  x 150 feet with Contact Guard Assistance using Rolling Walker.   6. Lower extremity exercise program x10-20 reps per handout, with independence                       Time Tracking:     PT Received On: 09/03/19  PT Start Time: 0905     PT Stop Time: 0933  PT Total Time (min): 28 min     Billable Minutes: Gait Training 14 and Therapeutic Activity 14    Treatment Type: Treatment  PT/PTA: PT           Jazmyne Coats, PT  09/03/2019

## 2019-09-03 NOTE — PLAN OF CARE
Problem: Occupational Therapy Goal  Goal: Occupational Therapy Goal  Goals to be met by: 9/17/19     Patient will increase functional independence with ADLs by performing:    LE Dressing with Set-up Assistance, Minimal Assistance and Assistive Devices as needed.  Grooming while standing at sink with Stand-by Assistance and Assistive Devices as needed.  Toileting from toilet with Contact Guard Assistance and Assistive Devices as needed for hygiene and clothing management.   Toilet transfer to toilet with Stand-by Assistance.  Upper extremity exercise program x15 reps per handout, with independence.    Outcome: Ongoing (interventions implemented as appropriate)  OT evaluation completed today. Goals & care plan established.    BIANCA Tovar  9/3/2019

## 2019-09-03 NOTE — PROGRESS NOTES
The enoxaparin dose has been adjusted for Iris Mueller [id number] according to the pharmacy practice protocol.      Based on the patient's Estimated Creatinine Clearance: 90.8 mL/min (based on SCr of 0.8 mg/dL)., Body mass index is 52.38 kg/m²., and weight= 129.9 kg (286 lb 6 oz), the enoxaparin dose has been adjusted 40 mg every 12 hours for CrCl =/>30 and BMI=/>40.    Thank you,  Vinay Schmidt, PharmD

## 2019-09-03 NOTE — PHYSICIAN QUERY
"PT Name: Iris Mueller  MR #: [id number]    Physician Query Form - Procedure Clarification     CDS: Melissa Patten RN, CCDS         Contact information :[phone]  [email or web address]     This form is a permanent document in the medical record.     Query Date: September 3, 2019  By submitting this query, we are merely seeking further clarification of documentation. Please utilize your independent clinical judgment when addressing the question(s) below.    The Medical record contains the following:     Indicators       Supporting Clinical Findings   Location in Medical Record   x Documentation of "Debridement"   We then debrided the soft tissue around the wound and under the wound, she had a fair amount of extension of the subcutaneous dehiscence in both the superior and inferior direction, mostly on the medial side of the knee.  All of the necrotic and nonviable tissue in this area was removed." Op note 8/30    Documentation of "I & D"      EBL =      Other:       Excisional debridement is a surgical removal of  nonvitalized tissue, necrosis or slough. The use of a sharp instrument does not always indicate that an excisional debridement was performed.  Non excisional debridement is the scraping, washing, irrigating, brushing away or removal of loose tissue fragments.    Provider, please specify type of procedure(s) performed:  Please document type of debridement as excisional vs. non-excisional and please clarify depth of tissue debrided.    x]    Excisional Debridement (Specify site and depth of tissue removed)         *Depth of tissue excised:    x]   Subcutaneous Tissue/Fascia   [    ] Other, please specify,_______     [    ]    Non-excisional Debridement         *Depth of tissue excised:      [    ]   SubcutaneousTissue/Fascia     [    ]   Other, please specify,____       [    ] Other Procedure (Specify) ________         [  ] Clinically Undetermined           "

## 2019-09-03 NOTE — PT/OT/SLP EVAL
Occupational Therapy   Evaluation    Name: Iris Mueller  MRN: [id number]  Admitting Diagnosis:  Septic shock 4 Days Post-Op    Recommendations:     Discharge Recommendations: home health PT, home health OT  Discharge Equipment Recommendations:  grab bar, toilet(hip kit)  Barriers to discharge:       Assessment:     Iris Meuller is a 63 y.o. female with a medical diagnosis of Septic shock.  She presents with a decline in functional status due to the listed impairments, impacting ADLs and functional mobility. Pt displays R LE edema, 5/10 pain, impaired activity tolerance and decreased B UE strength. Pt needs increased assistance for LB ADL's with R knee immobilizer to be worn for all activity. Pt just transferred from ICU before OT evaluation with c/o fatigue but was agreeable to B UE therapeutic exercises and elevating HOB. Patient was very receptive to all education provided on discharge recommendations. Several family members present who were very supportive. Recommend OT treatment to maximize endurance, safety & independence with ADL's & functional mobility.  Performance deficits affecting function: weakness, impaired self care skills, impaired endurance, impaired functional mobilty, gait instability, decreased lower extremity function, pain, orthopedic precautions, edema, impaired skin, decreased ROM.    Pt will benefit from HH OT & PT to increase ADL independence and safety in pt's home environment. Recommend installation of a grab bar at toilet to increase independence with toileting tasks.    Rehab Prognosis: Good; patient would benefit from acute skilled OT services to address these deficits and reach maximum level of function.       Plan:     Patient to be seen 4 x/week to address the above listed problems via self-care/home management, therapeutic activities, therapeutic exercises  · Plan of Care Expires: 09/17/19  · Plan of Care Reviewed with: patient, family    Subjective     Chief  Complaint: R knee pain  Patient/Family Comments/goals: To regain my independence    Occupational Profile:  Living Environment: Pt lives with her  in a [facility] with a walk-in shower.  Previous level of function: Since her R TKA surgery on 8/14/19, patient was needing assistance for ADL's, using a bariatric walker. Prior to her surgery on 8/14/19, she was (I)/Mod I with ADL's.  Roles and Routines: Pt reported that she was staying with a family member in [place] but could not receive HH therapy at that location.  Equipment Used at Home:  wheelchair, walker, rolling, cane, straight(Bedside commode that was delivered is too narrow for patient to use.)  Assistance upon Discharge: Family will assist pt at home.    Pain/Comfort:  · Pain Rating 1: 5/10  · Location - Side 1: Right  · Location 1: leg  · Pain Addressed 1: Pre-medicate for activity, Reposition, Distraction  · Pain Rating Post-Intervention 1: 5/10    Patients cultural, spiritual, Syn[employer] conflicts given the current situation:      Objective:     Communicated with: nurse Avelino prior to session.  Patient found HOB elevated with PureWick, PICC line, telemetry upon OT entry to room.    General Precautions: Standard, fall   Orthopedic Precautions:RLE weight bearing as tolerated   Braces: Knee immobilizer     Occupational Performance:    Activities of Daily Living:  · Feeding:  independence with HOB raised  · Grooming: set-up with HOB raised to brush her hair  · Toileting: purewick catheter in place. Pt used BSC earlier, needing increased assistance and reported difficulty getting off the commode.      Cognitive/Visual Perceptual:  Cognitive/Psychosocial Skills:     -       Follows Commands/attention:Follows multistep  commands  -       Communication: clear/fluent  -       Safety awareness/insight to disability: Intact   -       Mood/Affect/Coping skills/emotional control: Appropriate to situation and Cooperative  Visual/Perceptual:      -Intact      Physical  Exam:  Postural examination/scapula alignment:    -       Rounded shoulders  -       Forward head  -       R lower leg on mattress next to pillow. OT repositioned leg on pillow with heel floating.  Skin integrity: intact, large dressing on R knee and anterior lower leg & red areas on dorsal R foot and ankle  Edema:  Severe R foot  Upper Extremity Range of Motion:     -       Right Upper Extremity: WFL  -       Left Upper Extremity: WFL  Upper Extremity Strength:    -       Right Upper Extremity: WFL  -       Left Upper Extremity: WFL   Strength:    -       Right Upper Extremity: WFL  -       Left Upper Extremity: WFL  Fine Motor Coordination:    -       Intact    AMPAC 6 Click ADL:  AMPAC Total Score: 16    Treatment & Education:  OT ed pt on OT role & POC as well as discharge recommendations, including installing a grab bar at toilet in her bathroom. Pt and family stated that a bariatric BSC will not fit over her toilet as the area is too narrow. She has a countertop in reach she can use as well for toilet transfers.  OT ed pt & family on keeping HOB raised and sitting up in chair as pt will tolerate to counteract effects of bedrest.  OT issued red theraband to pt & educated pt on B UE resistive HEP. Pt performed B UE resistive therapeutic exercise x 10 reps each horizontal ad/abduction, shoulder flexion/extension & elbow flexion/extension with rest breaks taken between sets. Pt able to perform all exercises with Min A & cues after demonstration provided.     Education:    Patient left HOB elevated with all lines intact, call button in reach, Avelino, nurse notified and family present    GOALS:   Multidisciplinary Problems     Occupational Therapy Goals        Problem: Occupational Therapy Goal    Goal Priority Disciplines Outcome Interventions   Occupational Therapy Goal     OT, PT/OT Ongoing (interventions implemented as appropriate)    Description:  Goals to be met by: 9/17/19     Patient will increase  functional independence with ADLs by performing:    LE Dressing with Set-up Assistance, Minimal Assistance and Assistive Devices as needed.  Grooming while standing at sink with Stand-by Assistance and Assistive Devices as needed.  Toileting from toilet with Contact Guard Assistance and Assistive Devices as needed for hygiene and clothing management.   Toilet transfer to toilet with Stand-by Assistance.  Upper extremity exercise program x15 reps per handout, with independence.                      History:     Past Medical History:   Diagnosis Date    Asthma     Encounter for blood transfusion     Hypertension        Past Surgical History:   Procedure Laterality Date    ARTHROPLASTY, KNEE Right 2019    Performed by [facility] at [facility]     SECTION      INCISION AND DRAINAGE, LOWER EXTREMITY Right 2019    Performed by [facility] at [facility] OR    TONSILLECTOMY         Time Tracking:     OT Date of Treatment: 19  OT Start Time: 1457  OT Stop Time: 1533  OT Total Time (min): 36 min    Billable Minutes:Evaluation 10  Self Care/Home Management 12  Therapeutic Exercise 14    BIANCA Celis  9/3/2019

## 2019-09-03 NOTE — PLAN OF CARE
Problem: Adult Inpatient Plan of Care  Goal: Plan of Care Review  Outcome: Ongoing (interventions implemented as appropriate)  Patient experienced quiet hours through the shift with no sign of acute distress noted. Pt had several hours of uninterrupted sleep. Pain appears to be better controlled this shift. VS remain stable throughout shift. Will continue with current plan of care.

## 2019-09-03 NOTE — NURSING
Washed with wound cleanser and AquacelAg foam applied to R knee incision with minimal tape applied per orders. Pt. Tolerated well.

## 2019-09-03 NOTE — PROGRESS NOTES
Progress Note  Hospital Medicine  Patient Name:Iris Mueller  MRN:  [id number]  Patient Class: IP- Inpatient  Admit Date: 8/29/2019  Length of Stay: 5 days  Expected Discharge Date:   Attending Physician: Yazmin Jules MD  Primary Care Provider:  Elkin You MD    SUBJECTIVE:     Principal Problem: Septic shock  Initial history of present illness: Patient is a 63 y.o. female admitted to Hospitalist Service from [facility] Emergency Room with complaint of drainage from right knee incision. Patient reportedly has past medical history significant for hypertension and bronchial asthma. Patient underwent right total knee arthroplasty performed by Dr. Chung on August 14, 2019.  Patient presented to the emergency room with complaint of bloody drainage which is foul smelling from right knee incision.  Patient reports she was seen by primary care physician on August 26, 2019 when a hematoma was expressed out of the right knee incision manually.  Patient was started on oral clindamycin 300 mg 3 times daily.  Patient denied any associated fever, chills or any recent trauma to knee joint. Patient denied chest pain, shortness of breath, abdominal pain, nausea, vomiting, headache, vision changes, focal neuro-deficits, cough or fever.    PMH/PSH/SH/FH/Meds: reviewed.    Symptoms/Review of Systems:  Afebrile.  Hypotension is better and stable Not needing IV pressor agent. No shortness of breath, cough, chest pain or headache, fever or abdominal pain.  Knee pain is stable.   Diet:  Adequate intake.    Activity level:  Up with assist    Pain:  Patient reports no pain.       OBJECTIVE:   Vital Signs (Most Recent):      Temp: 97.7 °F (36.5 °C) (09/03/19 0400)  Pulse: 80 (09/03/19 0700)  Resp: (!) 21 (09/03/19 0700)  BP: (!) 90/52 (09/03/19 0700)  SpO2: 95 % (09/03/19 0700)       Vital Signs Range (Last 24H):  Temp:  [97.7 °F (36.5 °C)-98.6 °F (37 °C)]   Pulse:  [70-92]   Resp:  [19-40]   BP: ()/(52-79)    SpO2:  [90 %-100 %]     Weight: 129.9 kg (286 lb 6 oz)  Body mass index is 52.38 kg/m².    Intake/Output Summary (Last 24 hours) at 9/3/2019 0741  Last data filed at 9/3/2019 0700  Gross per 24 hour   Intake 3441.67 ml   Output 3208 ml   Net 233.67 ml     Physical Examination:  General appearance: well developed, appears stated age  Head: normocephalic, atraumatic  Eyes:  conjunctivae/corneas clear. PERRL.  Nose: Nares normal. Septum midline.  Throat: lips, mucosa, and tongue normal; teeth and gums normal, no throat erythema.  Neck: supple, symmetrical, trachea midline, no JVD and thyroid not enlarged, symmetric, no tenderness/mass/nodules  Lungs:  clear to auscultation bilaterally and normal respiratory effort  Chest wall: no tenderness  Heart: regular rate and rhythm, S1, S2 normal, no murmur, click, rub or gallop  Abdomen: soft, non-tender non-distended; bowel sounds normal; no masses,  no organomegaly  Extremities: no cyanosis, clubbing or edema.  right knee dressing clean dry and intact. A SYD drain is in place at upper 1/3 anterior medial shin with receding cellulitis of leg.  Knee joint is with erythema, increased warmth and hypersensitivity.  No obvious fluctuation present.  Pulses: 2+ and symmetric  Skin: Skin color, texture, turgor normal. No rashes or lesions.  Lymph nodes: Cervical, supraclavicular, and axillary nodes normal.  Neurologic: Normal strength and tone. No focal numbness or weakness. CNII-XII intact.      CBC:  Recent Labs   Lab 09/01/19  1042 09/02/19  0350 09/03/19  0322   WBC 10.48 10.06 8.91   RBC 3.13* 2.92* 2.97*   HGB 9.6* 8.8* 9.0*   HCT 30.3* 27.7* 28.5*   * 478* 525*   MCV 97 95 96   MCH 30.7 30.1 30.3   MCHC 31.7* 31.8* 31.6*   BMP  Recent Labs   Lab 09/01/19  1042 09/02/19  0349 09/03/19  0322   * 96 93    132* 132*   K 4.2 4.3 4.3    101 100   CO2 24 24 24   BUN 10 12 10   CREATININE 0.9 1.0 0.8   CALCIUM 8.7 8.9 9.3   MG 1.7 1.5* 1.8      Diagnostic  Results:  Microbiology Results (last 7 days)     Procedure Component Value Units Date/Time    Culture, Anaerobe [[id number]] Collected:  08/30/19 1521    Order Status:  Completed Specimen:  Wound from Knee, Right Updated:  09/02/19 1116     Anaerobic Culture Culture in progress    Aerobic culture [[id number]]  (Abnormal)  (Susceptibility) Collected:  08/30/19 1521    Order Status:  Completed Specimen:  Wound from Knee, Right Updated:  09/02/19 1021     Aerobic Bacterial Culture PROTEUS MIRABILIS  Few        ESCHERICHIA COLI  Few  No other significant isolate      Tissue culture [[id number]]  (Abnormal)  (Susceptibility) Collected:  08/30/19 1521    Order Status:  Completed Specimen:  Wound from Knee, Right Updated:  09/02/19 1017     Aerobic Culture - Tissue PROTEUS MIRABILIS  Few        GRAM NEGATIVE ANJUM  Rare  Identification and susceptibility pending  No other significant isolate       Gram Stain Result Rare WBC's      No organisms seen    Aerobic culture [[id number]]  (Abnormal)  (Susceptibility) Collected:  08/29/19 1634    Order Status:  Completed Specimen:  Incision site from Leg, Right Updated:  09/02/19 0956     Aerobic Bacterial Culture PROTEUS MIRABILIS  Many        KLEBSIELLA PNEUMONIAE  Moderate  No other significant isolate           RLE venous doppler scan; No evidence of deep venous thrombosis in the right lower extremity.    CXR: No acute findings.  Right PICC line in place.    Right knee x-ray: There is right TKA with the orthopedic hardware appearing in place and intact.    Assessment/Plan:      Severe Sepsis with post-op right knee wound infection status post incision and drainage  Postop wound infection of right knee joint and right leg cellulitis due to Proteus Mirabilis and Klebsiella sp  Osteoarthritis of right knee status post right total knee arthroplasty (August 14, 2019)  Follow Dr. Chung recommendations. Follow microbiology-wound culture results.  Discussed with Dr. Chung.  Follow ID  recommendations.  Continue intravenous Ceftriaxone antibiotic therapy.   Pain control with oral narcotics.  Continue physical therapy.  Observe fall precautions. Continue RLE elevation.  CRP is still elevated at 113.     * Septic shock  This patient does have evidence of infective focus  My overall impression is septic shock.  Source:  Septic joint  Antibiotics given- IV Rocephin.  Latest lactate reviewed-       Recent Labs   Lab 08/30/19  0803 08/30/19  2319   LACTATE 0.7 0.8      Organ dysfunction indicated by Encephalopathy     Shock with decreased perfusion noted, Fluid challenge  was given at 30cc/kg IBW  Post- resuscitation assessment- (Done after fluids given for shock)  Vitals:    09/03/19 0700   BP: (!) 90/52   Pulse: 80   Resp: (!) 21   Temp:      Perfusion assessment post bolus shows Continues poor peripheral pulses and delayed capillary refill  Source control achieved by:  Irrigation and removal of infected hematoma on her right knee on 08/30.     Yes    Essential hypertension [I10]  Chronic problem. Will continue chronic medications and monitor for any changes, adjusting as needed.      Yes    Fibromyalgia [M79.7]  As above.      Yes    Morbid obesity [E66.01]  Body mass index is 48.47 kg/m². Morbid obesity complicates all aspects of disease management from diagnostic modalities to treatment. Weight loss encouraged and health benefits explained to patient.   Yes        Acute blood loss anemia/iron deficiency anemia  Follow CBC and transfuse as needed.  Patient would benefit with iron supplementation upon discharge. Increase iron supplementation twice daily.    Hypomagnesemia  Replete magnesium, follow magnesium level daily.          DVT prophylaxis:  Use sequential compression stockings and Brennan hose to left leg.  Resume anticoagulation therapy in a.m. after surgical intervention     Discussed with multiple family members, answered all questions.     VTE Risk Mitigation (From admission, onward)         Ordered     enoxaparin injection 40 mg  Daily      09/02/19 1104     IP VTE HIGH RISK PATIENT  Once      08/30/19 1613     Place KAROLINA hose  Until discontinued      08/30/19 1613     Place sequential compression device  Until discontinued      08/30/19 1613        Yazmin Jules MD  Department of Hospital Medicine   [facility]

## 2019-09-03 NOTE — PROGRESS NOTES
Pt transferred to room 305 A via wheelchair; pt with brace on rt leg; remote tele on; pt assisted to bed; Shira at bedside to receive pt; pt sent with blanket, phone ; bed in low position and call light in hand, Shira at bedside upon my leaving the room

## 2019-09-03 NOTE — PLAN OF CARE
Problem: Physical Therapy Goal  Goal: Physical Therapy Goal  Goals to be met by: 2019     Patient will increase functional independence with mobility by performin. Supine to sit with Contact Guard Assistance  2. Sit to supine with Contact Guard Assistance  3. Sit to stand transfer with Stand-by Assistance  4. Bed to chair transfer with Contact Guard Assistance using Rolling Walker  5. Gait  x 150 feet with Contact Guard Assistance using Rolling Walker.   6. Lower extremity exercise program x10-20 reps per handout, with independence     Outcome: Ongoing (interventions implemented as appropriate)  Pt ambulated 20ft with RW and Min A with ANNA pride and chair follow.

## 2019-09-03 NOTE — PLAN OF CARE
Problem: Adult Inpatient Plan of Care  Goal: Plan of Care Review  Outcome: Ongoing (interventions implemented as appropriate)  Plan of care reviewed, patient verbalized understanding. AAOx4. Up ab bebe with 2 persona max assist, walker, and knee immobilizer on knee at all times when OOB. R UA PICC infusing per order. IV abx infusing per order. VS addressed. Tele monitored, SR. Dressing to R knee changed with wound dressing orders. Wound care consulted per orders. PT/OT. Remain afebrile throughout shift. Moderate pain control with PRN meds. Bed in lowest position, SRx2, brakes locked, call light within reach. Patient remains free from fall and injury. Will continue to monitor.

## 2019-09-03 NOTE — PLAN OF CARE
09/03/19 0659   Patient Assessment/Suction   Level of Consciousness (AVPU) alert   Respiratory Effort Normal;Unlabored   All Lung Fields Breath Sounds clear   Rhythm/Pattern, Respiratory no shortness of breath reported   PRE-TX-O2   O2 Device (Oxygen Therapy) room air   SpO2 96 %   Pulse Oximetry Type Continuous   $ Pulse Oximetry - Multiple Charge Pulse Oximetry - Multiple   Pulse 81   Resp (!) 24   Aerosol Therapy   $ Aerosol Therapy Charges PRN treatment not required

## 2019-09-03 NOTE — PROGRESS NOTES
Post-op    Pt AAOx3. Pain controlled. Feels better.    Right knee - wound CDI with no drainage. Drain removed. JOSE removed. Will ask wound care to recommend dressing. Will not allow bending to let wound heal.    Plan - ABX per ID. Knee joint does not appear involved at this time. PT to ambulate as tolerated. Brace to support knee to avoid bending.

## 2019-09-03 NOTE — PLAN OF CARE
Problem: Adult Inpatient Plan of Care  Goal: Plan of Care Review  Outcome: Ongoing (interventions implemented as appropriate)  OOB to chair today with PT assist. Brace placed on leg prior to getting OOB. Right leg remains edematous and red. Warm to touch. Dressing dry and intact. Drains x 2 intact. Doppler pulses.  Right arm PICC line intact. IVF's started today. Hypotensive at times this am. Continue to monitor. BM X 2 today. Voided 1L clear yellow urine, external cath in place. Safety maintained.

## 2019-09-03 NOTE — CONSULTS
Inflamed incision right knee post op.  Discussed plan of care with patient and Dr Chung.  Dressing orders written.

## 2019-09-04 LAB
ALBUMIN SERPL BCP-MCNC: 2.4 G/DL (ref 3.5–5.2)
ALP SERPL-CCNC: 111 U/L (ref 55–135)
ALT SERPL W/O P-5'-P-CCNC: 12 U/L (ref 10–44)
ANION GAP SERPL CALC-SCNC: 10 MMOL/L (ref 8–16)
AST SERPL-CCNC: 12 U/L (ref 10–40)
BACTERIA SPEC AEROBE CULT: ABNORMAL
BACTERIA SPEC ANAEROBE CULT: NORMAL
BASOPHILS # BLD AUTO: 0.04 K/UL (ref 0–0.2)
BASOPHILS NFR BLD: 0.5 % (ref 0–1.9)
BILIRUB SERPL-MCNC: 0.3 MG/DL (ref 0.1–1)
BUN SERPL-MCNC: 11 MG/DL (ref 8–23)
CALCIUM SERPL-MCNC: 9.4 MG/DL (ref 8.7–10.5)
CHLORIDE SERPL-SCNC: 100 MMOL/L (ref 95–110)
CO2 SERPL-SCNC: 24 MMOL/L (ref 23–29)
CREAT SERPL-MCNC: 0.8 MG/DL (ref 0.5–1.4)
DIFFERENTIAL METHOD: ABNORMAL
EOSINOPHIL # BLD AUTO: 0.2 K/UL (ref 0–0.5)
EOSINOPHIL NFR BLD: 3 % (ref 0–8)
ERYTHROCYTE [DISTWIDTH] IN BLOOD BY AUTOMATED COUNT: 15.9 % (ref 11.5–14.5)
EST. GFR  (AFRICAN AMERICAN): >60 ML/MIN/1.73 M^2
EST. GFR  (NON AFRICAN AMERICAN): >60 ML/MIN/1.73 M^2
GLUCOSE SERPL-MCNC: 96 MG/DL (ref 70–110)
HCT VFR BLD AUTO: 28.1 % (ref 37–48.5)
HGB BLD-MCNC: 8.8 G/DL (ref 12–16)
IMM GRANULOCYTES # BLD AUTO: 0.07 K/UL (ref 0–0.04)
LYMPHOCYTES # BLD AUTO: 1.2 K/UL (ref 1–4.8)
LYMPHOCYTES NFR BLD: 14.4 % (ref 18–48)
MAGNESIUM SERPL-MCNC: 1.7 MG/DL (ref 1.6–2.6)
MCH RBC QN AUTO: 30.1 PG (ref 27–31)
MCHC RBC AUTO-ENTMCNC: 31.3 G/DL (ref 32–36)
MCV RBC AUTO: 96 FL (ref 82–98)
MONOCYTES # BLD AUTO: 0.7 K/UL (ref 0.3–1)
MONOCYTES NFR BLD: 8.5 % (ref 4–15)
NEUTROPHILS # BLD AUTO: 5.9 K/UL (ref 1.8–7.7)
NEUTROPHILS NFR BLD: 72.7 % (ref 38–73)
NRBC BLD-RTO: 0 /100 WBC
PHOSPHATE SERPL-MCNC: 4.3 MG/DL (ref 2.7–4.5)
PLATELET # BLD AUTO: 519 K/UL (ref 150–350)
PMV BLD AUTO: 8.5 FL (ref 9.2–12.9)
POTASSIUM SERPL-SCNC: 4 MMOL/L (ref 3.5–5.1)
PROT SERPL-MCNC: 6.1 G/DL (ref 6–8.4)
RBC # BLD AUTO: 2.92 M/UL (ref 4–5.4)
SODIUM SERPL-SCNC: 134 MMOL/L (ref 136–145)
WBC # BLD AUTO: 8.13 K/UL (ref 3.9–12.7)

## 2019-09-04 PROCEDURE — 84100 ASSAY OF PHOSPHORUS: CPT

## 2019-09-04 PROCEDURE — 25000003 PHARM REV CODE 250: Performed by: INTERNAL MEDICINE

## 2019-09-04 PROCEDURE — 97116 GAIT TRAINING THERAPY: CPT | Performed by: PHYSICAL THERAPIST

## 2019-09-04 PROCEDURE — 99900035 HC TECH TIME PER 15 MIN (STAT)

## 2019-09-04 PROCEDURE — 30200315 PPD INTRADERMAL TEST REV CODE 302: Performed by: INTERNAL MEDICINE

## 2019-09-04 PROCEDURE — 99231 PR SUBSEQUENT HOSPITAL CARE,LEVL I: ICD-10-PCS | Mod: S$GLB,,, | Performed by: INTERNAL MEDICINE

## 2019-09-04 PROCEDURE — 97530 THERAPEUTIC ACTIVITIES: CPT | Performed by: PHYSICAL THERAPIST

## 2019-09-04 PROCEDURE — A4216 STERILE WATER/SALINE, 10 ML: HCPCS | Performed by: INTERNAL MEDICINE

## 2019-09-04 PROCEDURE — 80053 COMPREHEN METABOLIC PANEL: CPT

## 2019-09-04 PROCEDURE — 99231 SBSQ HOSP IP/OBS SF/LOW 25: CPT | Mod: S$GLB,,, | Performed by: INTERNAL MEDICINE

## 2019-09-04 PROCEDURE — 63600175 PHARM REV CODE 636 W HCPCS: Performed by: INTERNAL MEDICINE

## 2019-09-04 PROCEDURE — 12000002 HC ACUTE/MED SURGE SEMI-PRIVATE ROOM

## 2019-09-04 PROCEDURE — 83735 ASSAY OF MAGNESIUM: CPT

## 2019-09-04 PROCEDURE — 36415 COLL VENOUS BLD VENIPUNCTURE: CPT

## 2019-09-04 PROCEDURE — 86580 TB INTRADERMAL TEST: CPT | Performed by: INTERNAL MEDICINE

## 2019-09-04 PROCEDURE — 99024 POSTOP FOLLOW-UP VISIT: CPT | Mod: ,,, | Performed by: ORTHOPAEDIC SURGERY

## 2019-09-04 PROCEDURE — 94761 N-INVAS EAR/PLS OXIMETRY MLT: CPT

## 2019-09-04 PROCEDURE — 85025 COMPLETE CBC W/AUTO DIFF WBC: CPT

## 2019-09-04 PROCEDURE — 99024 PR POST-OP FOLLOW-UP VISIT: ICD-10-PCS | Mod: ,,, | Performed by: ORTHOPAEDIC SURGERY

## 2019-09-04 PROCEDURE — 97110 THERAPEUTIC EXERCISES: CPT | Performed by: PHYSICAL THERAPIST

## 2019-09-04 RX ADMIN — PANTOPRAZOLE SODIUM 40 MG: 40 TABLET, DELAYED RELEASE ORAL at 09:09

## 2019-09-04 RX ADMIN — HYDROCODONE BITARTRATE AND ACETAMINOPHEN 1 TABLET: 10; 325 TABLET ORAL at 09:09

## 2019-09-04 RX ADMIN — LOVASTATIN 20 MG: 20 TABLET ORAL at 09:09

## 2019-09-04 RX ADMIN — LACTOBACILLUS ACIDOPHILUS / LACTOBACILLUS BULGARICUS 1 EACH: 100 MILLION CFU STRENGTH GRANULES at 09:09

## 2019-09-04 RX ADMIN — ENOXAPARIN SODIUM 40 MG: 100 INJECTION SUBCUTANEOUS at 09:09

## 2019-09-04 RX ADMIN — Medication 10 ML: at 06:09

## 2019-09-04 RX ADMIN — Medication 10 ML: at 11:09

## 2019-09-04 RX ADMIN — ASPIRIN 81 MG: 81 TABLET, COATED ORAL at 09:09

## 2019-09-04 RX ADMIN — HYDROCODONE BITARTRATE AND ACETAMINOPHEN 1 TABLET: 10; 325 TABLET ORAL at 02:09

## 2019-09-04 RX ADMIN — OXYCODONE HYDROCHLORIDE 10 MG: 10 TABLET ORAL at 11:09

## 2019-09-04 RX ADMIN — OXYCODONE HYDROCHLORIDE 10 MG: 10 TABLET ORAL at 07:09

## 2019-09-04 RX ADMIN — HYDROCODONE BITARTRATE AND ACETAMINOPHEN 1 TABLET: 10; 325 TABLET ORAL at 10:09

## 2019-09-04 RX ADMIN — FERROUS SULFATE TAB EC 325 MG (65 MG FE EQUIVALENT) 325 MG: 325 (65 FE) TABLET DELAYED RESPONSE at 09:09

## 2019-09-04 RX ADMIN — Medication 10 ML: at 05:09

## 2019-09-04 RX ADMIN — FLUOXETINE 40 MG: 20 CAPSULE ORAL at 09:09

## 2019-09-04 RX ADMIN — TUBERCULIN PURIFIED PROTEIN DERIVATIVE 5 UNITS: 5 INJECTION, SOLUTION INTRADERMAL at 02:09

## 2019-09-04 RX ADMIN — CEFTRIAXONE 2 G: 2 INJECTION, SOLUTION INTRAVENOUS at 06:09

## 2019-09-04 RX ADMIN — FLUTICASONE PROPIONATE 50 MCG: 50 SPRAY, METERED NASAL at 09:09

## 2019-09-04 RX ADMIN — TRAZODONE HYDROCHLORIDE 50 MG: 50 TABLET ORAL at 09:09

## 2019-09-04 RX ADMIN — HYDROCODONE BITARTRATE AND ACETAMINOPHEN 1 TABLET: 10; 325 TABLET ORAL at 05:09

## 2019-09-04 NOTE — PLAN OF CARE
Cm spoke with daughter and pt about snf.   Pt signed the Pt's Choice Disclosure Form.  Daughter and pt agreed to send referrals to [facility] [facility].  Cm sent referrals to [facility] via [facility].       09/04/19 1111   Post-Acute Status   Post-Acute Authorization Placement   Post-Acute Placement Status Referrals Sent

## 2019-09-04 NOTE — PLAN OF CARE
09/03/19 2003   Patient Assessment/Suction   Level of Consciousness (AVPU) alert   Respiratory Effort Normal;Unlabored   Expansion/Accessory Muscles/Retractions expansion symmetric   All Lung Fields Breath Sounds diminished   Rhythm/Pattern, Respiratory pattern regular   PRE-TX-O2   O2 Device (Oxygen Therapy) room air   SpO2 96 %   Pulse Oximetry Type Intermittent   Aerosol Therapy   $ Aerosol Therapy Charges PRN treatment not required

## 2019-09-04 NOTE — PROGRESS NOTES
Post-op    Pt AAOx3. Pain controlled. Limited ambulation.    Right knee - decreased redness and swelling. Still some redness and mild bloody dc on dressing from wound edge. NVI. No wound drainage.    Plan - Continue ABX per ID. Continue PT as tolerated without bending knee to protect wound edges, use brace as needed. CRP trending down. Daily dressing changes per wound care.

## 2019-09-04 NOTE — PT/OT/SLP PROGRESS
Physical Therapy Treatment    Patient Name:  Iris Mueller   MRN:  [id number]    Recommendations:     Discharge Recommendations:  home health PT, home health OT   Discharge Equipment Recommendations: grab bar, toilet(hip kit)   Barriers to discharge: None    Assessment:     Iris Mueller is a 63 y.o. female admitted with a medical diagnosis of Septic shock.  She presents with the following impairments/functional limitations:  weakness, gait instability, decreased upper extremity function, impaired endurance, impaired balance, decreased lower extremity function, pain, orthopedic precautions, impaired functional mobilty, decreased safety awareness.  During PT tx, pt was able to complete some supine exercises, nurse Silva present to change pt's dressing on the R knee and give pain medication.  Pt then required Min A for transfer supine<>sit with HOB raised (pt normally sleeps in a recliner at home).  Pt then transferred sit<>Stand with Min A, RW, and R KI on.  She was able to ambulate 50ft with RW, Min A, and KI on with chair follow.    Rehab Prognosis: Good; patient would benefit from acute skilled PT services to address these deficits and reach maximum level of function.    Recent Surgery: Procedure(s) (LRB):  INCISION AND DRAINAGE, LOWER EXTREMITY (Right) 5 Days Post-Op    Plan:     During this hospitalization, patient to be seen daily to address the identified rehab impairments via gait training, therapeutic activities, therapeutic exercises and progress toward the following goals:    · Plan of Care Expires:  09/16/19    Subjective     Chief Complaint: burning in R LE   Patient/Family Comments/goals: to get strong enough to go home  Pain/Comfort:  · Pain Rating 1: 8/10  · Location - Side 1: Right  · Location 1: leg  · Pain Addressed 1: Pre-medicate for activity, Reposition, Distraction      Objective:     Communicated with nurse Silva prior to session.  Patient found HOB elevated with PureWick,  peripheral IV, telemetry upon PT entry to room.     General Precautions: Standard, fall   Orthopedic Precautions:RLE weight bearing as tolerated   Braces: Knee immobilizer     Functional Mobility:  · Bed Mobility:     · Supine to Sit: minimum assistance and HOB raised  · Transfers:     · Sit to Stand:  minimum assistance with rolling walker  · Gait: 50ft with RW and Min A, KI on R LE, and chair follow.   · Balance: fair      AM-PAC 6 CLICK MOBILITY          Therapeutic Activities and Exercises:   Supine AP's and GS x 20 reps bilaterally - pt c/o burning sensation while performing exercises, therefore did not do QS today.     Patient left up in chair with all lines intact, call button in reach, chair alarm on and nurse Shira notified..    GOALS:   Multidisciplinary Problems     Physical Therapy Goals        Problem: Physical Therapy Goal    Goal Priority Disciplines Outcome Goal Variances Interventions   Physical Therapy Goal     PT, PT/OT Ongoing (interventions implemented as appropriate)     Description:  Goals to be met by: 2019     Patient will increase functional independence with mobility by performin. Supine to sit with Contact Guard Assistance  2. Sit to supine with Contact Guard Assistance  3. Sit to stand transfer with Stand-by Assistance  4. Bed to chair transfer with Contact Guard Assistance using Rolling Walker  5. Gait  x 150 feet with Contact Guard Assistance using Rolling Walker.   6. Lower extremity exercise program x10-20 reps per handout, with independence                      Time Tracking:     PT Received On: 19  PT Start Time: 858     PT Stop Time: 946  PT Total Time (min): 48 min     Billable Minutes: Gait Training 16, Therapeutic Activity 16 and Therapeutic Exercise 16    Treatment Type: Treatment  PT/PTA: PT           Jazmyne Coats, PT  2019

## 2019-09-04 NOTE — PROGRESS NOTES
Progress Note  Hospital Medicine  Patient Name:Iris Mueller  MRN:  [id number]  Patient Class: IP- Inpatient  Admit Date: 8/29/2019  Length of Stay: 6 days  Expected Discharge Date:   Attending Physician: Yazmin Jules MD  Primary Care Provider:  Elkin You MD    SUBJECTIVE:     Principal Problem: Septic shock  Initial history of present illness: Patient is a 63 y.o. female admitted to Hospitalist Service from [facility] Emergency Room with complaint of drainage from right knee incision. Patient reportedly has past medical history significant for hypertension and bronchial asthma. Patient underwent right total knee arthroplasty performed by Dr. Chung on August 14, 2019.  Patient presented to the emergency room with complaint of bloody drainage which is foul smelling from right knee incision.  Patient reports she was seen by primary care physician on August 26, 2019 when a hematoma was expressed out of the right knee incision manually.  Patient was started on oral clindamycin 300 mg 3 times daily.  Patient denied any associated fever, chills or any recent trauma to knee joint. Patient denied chest pain, shortness of breath, abdominal pain, nausea, vomiting, headache, vision changes, focal neuro-deficits, cough or fever.    PMH/PSH/SH/FH/Meds: reviewed.    Symptoms/Review of Systems:  Afebrile.  Hypotension is better and stable.  Starting to get out of bed and work with physical therapy.  No shortness of breath, cough, chest pain or headache, fever or abdominal pain.  Knee pain is stable.   Diet:  Adequate intake.    Activity level:  Up with assist    Pain:  Patient reports no pain.       OBJECTIVE:   Vital Signs (Most Recent):      Temp: 97.5 °F (36.4 °C) (09/04/19 0807)  Pulse: 87 (09/04/19 0807)  Resp: 20 (09/04/19 0807)  BP: 117/73 (09/04/19 0807)  SpO2: (!) 93 % (09/04/19 0807)       Vital Signs Range (Last 24H):  Temp:  [97.5 °F (36.4 °C)-98.5 °F (36.9 °C)]   Pulse:  [72-90]   Resp:   [18-20]   BP: ()/(61-88)   SpO2:  [91 %-96 %]     Weight: 129.9 kg (286 lb 6 oz)  Body mass index is 52.38 kg/m².    Intake/Output Summary (Last 24 hours) at 9/4/2019 0925  Last data filed at 9/4/2019 0600  Gross per 24 hour   Intake 1320 ml   Output 300 ml   Net 1020 ml     Physical Examination:  General appearance: well developed, appears stated age  Head: normocephalic, atraumatic  Eyes:  conjunctivae/corneas clear. PERRL.  Nose: Nares normal. Septum midline.  Throat: lips, mucosa, and tongue normal; teeth and gums normal, no throat erythema.  Neck: supple, symmetrical, trachea midline, no JVD and thyroid not enlarged, symmetric, no tenderness/mass/nodules  Lungs:  clear to auscultation bilaterally and normal respiratory effort  Chest wall: no tenderness  Heart: regular rate and rhythm, S1, S2 normal, no murmur, click, rub or gallop  Abdomen: soft, non-tender non-distended; bowel sounds normal; no masses,  no organomegaly  Extremities: no cyanosis, clubbing or edema.  right knee dressing clean dry and intact. A SYD drain is in place at upper 1/3 anterior medial shin with receding cellulitis of leg.  Knee joint is with erythema, increased warmth and hypersensitivity.  No obvious fluctuation present.  Pulses: 2+ and symmetric  Skin: Skin color, texture, turgor normal. No rashes or lesions.  Lymph nodes: Cervical, supraclavicular, and axillary nodes normal.  Neurologic: Normal strength and tone. No focal numbness or weakness. CNII-XII intact.      CBC:  Recent Labs   Lab 09/02/19  0350 09/03/19 0322 09/04/19  0525   WBC 10.06 8.91 8.13   RBC 2.92* 2.97* 2.92*   HGB 8.8* 9.0* 8.8*   HCT 27.7* 28.5* 28.1*   * 525* 519*   MCV 95 96 96   MCH 30.1 30.3 30.1   MCHC 31.8* 31.6* 31.3*   BMP  Recent Labs   Lab 09/02/19  0349 09/03/19  0322 09/04/19  0526   GLU 96 93 96   * 132* 134*   K 4.3 4.3 4.0    100 100   CO2 24 24 24   BUN 12 10 11   CREATININE 1.0 0.8 0.8   CALCIUM 8.9 9.3 9.4   MG 1.5* 1.8  1.7      Diagnostic Results:  Microbiology Results (last 7 days)     Procedure Component Value Units Date/Time    Aerobic culture [[id number]]  (Abnormal)  (Susceptibility) Collected:  08/29/19 1634    Order Status:  Completed Specimen:  Incision site from Leg, Right Updated:  09/04/19 0642     Aerobic Bacterial Culture PROTEUS MIRABILIS  Many        KLEBSIELLA PNEUMONIAE  Moderate        ESCHERICHIA COLI  Moderate  No other significant isolate      Tissue culture [[id number]]  (Abnormal)  (Susceptibility) Collected:  08/30/19 1521    Order Status:  Completed Specimen:  Wound from Knee, Right Updated:  09/03/19 0946     Aerobic Culture - Tissue PROTEUS MIRABILIS  Few        ESCHERICHIA COLI  Rare  No other significant isolate       Gram Stain Result Rare WBC's      No organisms seen    Culture, Anaerobe [[id number]] Collected:  08/30/19 1521    Order Status:  Completed Specimen:  Wound from Knee, Right Updated:  09/02/19 1116     Anaerobic Culture Culture in progress    Aerobic culture [[id number]]  (Abnormal)  (Susceptibility) Collected:  08/30/19 1521    Order Status:  Completed Specimen:  Wound from Knee, Right Updated:  09/02/19 1021     Aerobic Bacterial Culture PROTEUS MIRABILIS  Few        ESCHERICHIA COLI  Few  No other significant isolate           RLE venous doppler scan; No evidence of deep venous thrombosis in the right lower extremity.    CXR: No acute findings.  Right PICC line in place.    Right knee x-ray: There is right TKA with the orthopedic hardware appearing in place and intact.    Assessment/Plan:      Severe Sepsis with post-op right knee wound infection status post incision and drainage  Postop wound infection of right knee joint and right leg cellulitis due to Proteus Mirabilis and Klebsiella sp  Osteoarthritis of right knee status post right total knee arthroplasty (August 14, 2019)  Follow Dr. Chung recommendations. Follow microbiology-wound culture results.    Follow ID  recommendations.  Continue intravenous Ceftriaxone antibiotic therapy.   Pain control with oral narcotics.  Continue physical therapy.  Observe fall precautions. Continue RLE elevation.  CRP is trending down.     * Septic shock  This patient does have evidence of infective focus  My overall impression is septic shock.  Source:  Septic joint  Antibiotics given- IV Rocephin.  Latest lactate reviewed-       Recent Labs   Lab 08/30/19  0803 08/30/19  2319   LACTATE 0.7 0.8      Organ dysfunction indicated by Encephalopathy     Shock with decreased perfusion noted, Fluid challenge  was given at 30cc/kg IBW  Post- resuscitation assessment- (Done after fluids given for shock)  Vitals:    09/04/19 0807   BP: 117/73   Pulse: 87   Resp: 20   Temp: 97.5 °F (36.4 °C)     Perfusion assessment post bolus shows Continues poor peripheral pulses and delayed capillary refill  Source control achieved by:  Irrigation and removal of infected hematoma on her right knee on 08/30.     Yes    Essential hypertension [I10]  Chronic problem. Will continue chronic medications and monitor for any changes, adjusting as needed.      Yes    Fibromyalgia [M79.7]  As above.      Yes    Morbid obesity [E66.01]  Body mass index is 48.47 kg/m². Morbid obesity complicates all aspects of disease management from diagnostic modalities to treatment. Weight loss encouraged and health benefits explained to patient.   Yes        Acute blood loss anemia/iron deficiency anemia  Follow CBC and transfuse as needed.  Patient would benefit with iron supplementation upon discharge. Increase iron supplementation twice daily.    Hypomagnesemia  Replete magnesium, follow magnesium level daily.           Patient would benefit with skilled nursing facility placement.  Patient and her daughter are in agreement.  Keep discussed with .     VTE Risk Mitigation (From admission, onward)        Ordered     enoxaparin injection 40 mg  Every 12 hours      09/03/19  0930     IP VTE HIGH RISK PATIENT  Once      08/30/19 1613     Place KAROLINA hose  Until discontinued      08/30/19 1613     Place sequential compression device  Until discontinued      08/30/19 1613        Yazmin Jules MD  Department of Hospital Medicine   [facility]

## 2019-09-04 NOTE — PLAN OF CARE
Problem: Physical Therapy Goal  Goal: Physical Therapy Goal  Goals to be met by: 2019     Patient will increase functional independence with mobility by performin. Supine to sit with Contact Guard Assistance  2. Sit to supine with Contact Guard Assistance  3. Sit to stand transfer with Stand-by Assistance  4. Bed to chair transfer with Contact Guard Assistance using Rolling Walker  5. Gait  x 150 feet with Contact Guard Assistance using Rolling Walker.   6. Lower extremity exercise program x10-20 reps per handout, with independence     Outcome: Ongoing (interventions implemented as appropriate)  Pt ambulated 50ft with RW and Min A with chair follow, and ANNA pride.

## 2019-09-04 NOTE — PLAN OF CARE
Problem: Adult Inpatient Plan of Care  Goal: Plan of Care Review  Outcome: Ongoing (interventions implemented as appropriate)  Plan of care reviewed with pt, verbalized understanding. PIV clean and intact. Ambulated to bathroom with walker and assistant , tolerated well.  immobilizer off while  Pt is in bed. Pain well managed with PRN pain medication.  Dressing to the knee clean and intact. Call light kept within reach, bed in locked and lowest position, side rails up x2.

## 2019-09-04 NOTE — PROGRESS NOTES
Progress Note  Infectious Disease    Follow-up For:  Right knee post surgical infection    SUBJECTIVE:    09/01/2019 AG Right knee pain 7/10, sharp, intermittent on and off, improved with opiates.  Pressors are off. Hopefully she does not need  Any more pressors tonight. Afebrile;  had one BM    096/02/2019 AG Afebrile. No pressors. No new complaints, expected right knee pain, 5/10, worse with moving, better with rest and opiates, improved associated swelling.   She would like to have her knee brace and get out of bed as her surgeon had recommended. I explained that we could not get her OOB over the past 2 days bc of need of pressors, but hopefully we can do that today    9/3: chart reviewed. Moved out of ICU. Ortho notes reviewed. Desires discharge as soon as able. Unsure of how mobile she is. Eating well. No mouth soreness of abd pain, diarrhea or dysuria. No antibiotic intolerance. picc right arm non tender.     Antibiotics (From admission, onward)    Start     Stop Route Frequency Ordered    09/01/19 1815  cefTRIAXone (ROCEPHIN) 2 g in dextrose 5 % 50 mL IVPB      -- IV Every 24 hours (non-standard times) 09/01/19 1700        Antifungals (From admission, onward)    None        Antivirals (From admission, onward)    None            EXAM & DIAGNOSTICS REVIEWED:   Vitals:     Temp:  [97.7 °F (36.5 °C)-98.4 °F (36.9 °C)]   Temp: 98.2 °F (36.8 °C) (09/03/19 1545)  Pulse: 72 (09/03/19 1545)  Resp: 18 (09/03/19 1545)  BP: 109/61 (09/03/19 1545)  SpO2: 96 % (09/03/19 2003)    Intake/Output Summary (Last 24 hours) at 9/3/2019 2037  Last data filed at 9/3/2019 0700  Gross per 24 hour   Intake 1131.67 ml   Output 2183 ml   Net -1051.33 ml       General:  In NAD. Alert and attentive, cooperative, comfortable  Eyes:  Anicteric, PERRL, EOMI  ENT:  No ulcers, exudates, thrush, nares patent, voice is hoarse, consistent with his lung smoking history  Neck:  supple,    Lungs:  Clear, no consolidation, rales, wheezes,  rub  Heart:  Regular rate and rhythm without gallops  Abd:  Soft, NT, ND, normal BS, no masses or organomegaly appreciated.  Morbidly obese  :  purewick, urine clear, no flank tenderness  Musc:  Excluding right knee Joints without effusion, swelling,  erythema, synovitis,muscle wasting.   Skin:  No rashes. No palmar or plantar lesions. No subungual petechiae  Wound: Photo did not load. Lots of bruising, including down to foot. The wound edges are approximated but fragile and irregular. There is substantial bloody drainage. No acute cellulitis and no foul smell.   Neuro:  Alert, attentive, speech fluent, face symmetric, moves all extremities, no focal weakness   Psych:  Calm, cooperative  Extrem: Excluding right leg, No edema, erythema, phlebitis, cellulitis, warm and well perfused  VAD:  RT PICC  Isolation:     Lines/Tubes/Drains:    General Labs reviewed:  Recent Labs   Lab 09/01/19  1042 09/02/19  0350 09/03/19  0322   WBC 10.48 10.06 8.91   HGB 9.6* 8.8* 9.0*   HCT 30.3* 27.7* 28.5*   * 478* 525*       Recent Labs   Lab 09/01/19  1042 09/02/19  0349 09/03/19  0322    132* 132*   K 4.2 4.3 4.3    101 100   CO2 24 24 24   BUN 10 12 10   CREATININE 0.9 1.0 0.8   CALCIUM 8.7 8.9 9.3   PROT 6.0 5.6* 5.9*   BILITOT 0.5 0.4 0.3   ALKPHOS 106 93 111   ALT 13 13 13   AST 13 13 11     Recent Labs   Lab 09/01/19  0325 09/02/19  0349 09/03/19  0322   .0* 113.2* 103.4*       Micro:   Microbiology Results (last 7 days)     Procedure Component Value Units Date/Time    Tissue culture [[id number]]  (Abnormal)  (Susceptibility) Collected:  08/30/19 1521    Order Status:  Completed Specimen:  Wound from Knee, Right Updated:  09/03/19 0946     Aerobic Culture - Tissue PROTEUS MIRABILIS  Few        ESCHERICHIA COLI  Rare  No other significant isolate       Gram Stain Result Rare WBC's      No organisms seen    Aerobic culture [[id number]]  (Abnormal)  (Susceptibility) Collected:  08/29/19 3704    Order  Status:  Completed Specimen:  Incision site from Leg, Right Updated:  09/03/19 0942     Aerobic Bacterial Culture PROTEUS MIRABILIS  Many        KLEBSIELLA PNEUMONIAE  Moderate  No other significant isolate      Culture, Anaerobe [[id number]] Collected:  08/30/19 1521    Order Status:  Completed Specimen:  Wound from Knee, Right Updated:  09/02/19 1116     Anaerobic Culture Culture in progress    Aerobic culture [[id number]]  (Abnormal)  (Susceptibility) Collected:  08/30/19 1521    Order Status:  Completed Specimen:  Wound from Knee, Right Updated:  09/02/19 1021     Aerobic Bacterial Culture PROTEUS MIRABILIS  Few        ESCHERICHIA COLI  Few  No other significant isolate            Aerobic culture [[id number]] (Abnormal)  Collected: 08/29/19 1634   Order Status: Completed Specimen: Incision site from Leg, Right Updated: 09/01/19 1223    Aerobic Bacterial Culture PROTEUS MIRABILIS   Many   Abnormal      KLEBSIELLA PNEUMONIAE   Moderate   No other significant isolate   Abnormal    Susceptibility      Proteus mirabilis Klebsiella pneumoniae     CULTURE, AEROBIC  (SPECIFY SOURCE) (Preliminary) CULTURE, AEROBIC  (SPECIFY SOURCE) (Preliminary)     Amox/K Clav'ate <=8/4 mcg/mL Sensitive <=8/4 mcg/mL Sensitive     Amp/Sulbactam <=8/4 mcg/mL Sensitive <=8/4 mcg/mL Sensitive     Ampicillin <=8 mcg/mL Sensitive       Cefazolin <=8 mcg/mL Sensitive <=2 mcg/mL Sensitive     Cefepime <=8 mcg/mL Sensitive <=2 mcg/mL Sensitive     Ceftriaxone <=8 mcg/mL Sensitive <=1 mcg/mL Sensitive     Ciprofloxacin <=1 mcg/mL Sensitive <=1 mcg/mL Sensitive     Gentamicin <=4 mcg/mL Sensitive <=4 mcg/mL Sensitive     Levofloxacin <=2 mcg/mL Sensitive <=2 mcg/mL Sensitive     Piperacillin/Tazo <=16 mcg/mL Sensitive <=16 mcg/mL Sensitive     Tetracycline   >8 mcg/mL Resistant     Tobramycin <=4 mcg/mL Sensitive <=4 mcg/mL Sensitive     Trimeth/Sulfa <=2/38 mcg/mL Sensitive <=2/38 mcg/mL Sensitive                Imaging Reviewed:   Rt Knee XR    Right knee arthroplasty without immediate complication.   US Rt 08/29/2019  No evidence of deep venous thrombosis in the right lower extremity.   US BL 08/23/2019 No evidence of deep venous thrombosis in either lower extremity.   CXR 08/31  No acute findings.  Right PICC line in place  Knee xr   There is right TKA with the orthopedic hardware appearing in place and intact.    IMPRESSION & PLAN      Proteus and Klebsiella Surgical wound infection.   no knee involvement per of note.       Septic shock postop, resolved   Leukocytosis improved    ---138--113--103. procal 0.08   Expect slow healing bc of multiple comorbidities   Ho COPD, Obesity, HTN, FM, anemia    SM referred to carotids vs carotid bruit. Follow with PCP re carotid US   Smoker advised against        Recommendations:    Ceftriaxone d3 (antibiotic day 5), and would like to see her receive this for several more days given how ill she was on admission.    If she is safely mobile, I do not object to IV antibiotics at home(in a few days)

## 2019-09-04 NOTE — PLAN OF CARE
Problem: Adult Inpatient Plan of Care  Goal: Plan of Care Review  Outcome: Ongoing (interventions implemented as appropriate)  Plan of care reviewed with pt, verbalized understanding. PICC  clean and intact. Ambulated to bathroom with walker and assistant , tolerated well. Ambulated with pt to middle of martins and back.  Immobilizer off while in bed. Pain controlled  with PRN pain medication.  Dressing to the knee changed per nurse. Small amount serosanguinous drainage to old dressing.PPD placed to inner left forearm. Hourly/Q2 rounding completed for safety. Call light kept within reach, bed in locked and lowest position, side rails up x2. Will continue to monitor.

## 2019-09-04 NOTE — CARE UPDATE
09/04/19 0905   Patient Assessment/Suction   Level of Consciousness (AVPU) alert   Respiratory Effort Normal;Unlabored   PRE-TX-O2   O2 Device (Oxygen Therapy) room air   SpO2 96 %   Pulse Oximetry Type Intermittent   $ Pulse Oximetry - Multiple Charge Pulse Oximetry - Multiple   Pulse 71   Resp 16   Aerosol Therapy   $ Aerosol Therapy Charges PRN treatment not required   Respiratory Treatment Status (SVN) PRN treatment not required

## 2019-09-05 VITALS
SYSTOLIC BLOOD PRESSURE: 99 MMHG | OXYGEN SATURATION: 92 % | RESPIRATION RATE: 20 BRPM | HEIGHT: 62 IN | BODY MASS INDEX: 52.7 KG/M2 | WEIGHT: 286.38 LBS | HEART RATE: 72 BPM | DIASTOLIC BLOOD PRESSURE: 66 MMHG | TEMPERATURE: 98 F

## 2019-09-05 LAB
ALBUMIN SERPL BCP-MCNC: 2.2 G/DL (ref 3.5–5.2)
ALP SERPL-CCNC: 101 U/L (ref 55–135)
ALT SERPL W/O P-5'-P-CCNC: 9 U/L (ref 10–44)
ANION GAP SERPL CALC-SCNC: 9 MMOL/L (ref 8–16)
AST SERPL-CCNC: 10 U/L (ref 10–40)
BASOPHILS # BLD AUTO: 0.03 K/UL (ref 0–0.2)
BASOPHILS NFR BLD: 0.4 % (ref 0–1.9)
BILIRUB SERPL-MCNC: 0.2 MG/DL (ref 0.1–1)
BUN SERPL-MCNC: 10 MG/DL (ref 8–23)
CALCIUM SERPL-MCNC: 8.9 MG/DL (ref 8.7–10.5)
CHLORIDE SERPL-SCNC: 102 MMOL/L (ref 95–110)
CO2 SERPL-SCNC: 22 MMOL/L (ref 23–29)
CREAT SERPL-MCNC: 0.8 MG/DL (ref 0.5–1.4)
DIFFERENTIAL METHOD: ABNORMAL
EOSINOPHIL # BLD AUTO: 0.3 K/UL (ref 0–0.5)
EOSINOPHIL NFR BLD: 4.3 % (ref 0–8)
ERYTHROCYTE [DISTWIDTH] IN BLOOD BY AUTOMATED COUNT: 15.9 % (ref 11.5–14.5)
EST. GFR  (AFRICAN AMERICAN): >60 ML/MIN/1.73 M^2
EST. GFR  (NON AFRICAN AMERICAN): >60 ML/MIN/1.73 M^2
GLUCOSE SERPL-MCNC: 114 MG/DL (ref 70–110)
HCT VFR BLD AUTO: 26.3 % (ref 37–48.5)
HGB BLD-MCNC: 8.3 G/DL (ref 12–16)
IMM GRANULOCYTES # BLD AUTO: 0.06 K/UL (ref 0–0.04)
LYMPHOCYTES # BLD AUTO: 1.3 K/UL (ref 1–4.8)
LYMPHOCYTES NFR BLD: 18.5 % (ref 18–48)
MAGNESIUM SERPL-MCNC: 1.6 MG/DL (ref 1.6–2.6)
MCH RBC QN AUTO: 30.5 PG (ref 27–31)
MCHC RBC AUTO-ENTMCNC: 31.6 G/DL (ref 32–36)
MCV RBC AUTO: 97 FL (ref 82–98)
MONOCYTES # BLD AUTO: 0.6 K/UL (ref 0.3–1)
MONOCYTES NFR BLD: 8.2 % (ref 4–15)
NEUTROPHILS # BLD AUTO: 4.7 K/UL (ref 1.8–7.7)
NEUTROPHILS NFR BLD: 67.7 % (ref 38–73)
NRBC BLD-RTO: 0 /100 WBC
PHOSPHATE SERPL-MCNC: 4.1 MG/DL (ref 2.7–4.5)
PLATELET # BLD AUTO: 462 K/UL (ref 150–350)
PMV BLD AUTO: 8.3 FL (ref 9.2–12.9)
POTASSIUM SERPL-SCNC: 4 MMOL/L (ref 3.5–5.1)
PROT SERPL-MCNC: 5.6 G/DL (ref 6–8.4)
RBC # BLD AUTO: 2.72 M/UL (ref 4–5.4)
SODIUM SERPL-SCNC: 133 MMOL/L (ref 136–145)
WBC # BLD AUTO: 6.91 K/UL (ref 3.9–12.7)

## 2019-09-05 PROCEDURE — 97535 SELF CARE MNGMENT TRAINING: CPT

## 2019-09-05 PROCEDURE — 25000003 PHARM REV CODE 250: Performed by: INTERNAL MEDICINE

## 2019-09-05 PROCEDURE — 36415 COLL VENOUS BLD VENIPUNCTURE: CPT

## 2019-09-05 PROCEDURE — 85025 COMPLETE CBC W/AUTO DIFF WBC: CPT

## 2019-09-05 PROCEDURE — A4216 STERILE WATER/SALINE, 10 ML: HCPCS | Performed by: INTERNAL MEDICINE

## 2019-09-05 PROCEDURE — 99900035 HC TECH TIME PER 15 MIN (STAT)

## 2019-09-05 PROCEDURE — 80053 COMPREHEN METABOLIC PANEL: CPT

## 2019-09-05 PROCEDURE — 97116 GAIT TRAINING THERAPY: CPT | Performed by: PHYSICAL THERAPIST

## 2019-09-05 PROCEDURE — 63600175 PHARM REV CODE 636 W HCPCS: Performed by: INTERNAL MEDICINE

## 2019-09-05 PROCEDURE — 84100 ASSAY OF PHOSPHORUS: CPT

## 2019-09-05 PROCEDURE — 97530 THERAPEUTIC ACTIVITIES: CPT | Performed by: PHYSICAL THERAPIST

## 2019-09-05 PROCEDURE — 83735 ASSAY OF MAGNESIUM: CPT

## 2019-09-05 RX ORDER — ASPIRIN 325 MG
325 TABLET ORAL 2 TIMES DAILY
Refills: 0 | Status: ON HOLD
Start: 2019-09-05 | End: 2019-10-07 | Stop reason: HOSPADM

## 2019-09-05 RX ORDER — OXYCODONE AND ACETAMINOPHEN 10; 325 MG/1; MG/1
1 TABLET ORAL EVERY 6 HOURS PRN
Qty: 10 TABLET | Refills: 0 | Status: ON HOLD | OUTPATIENT
Start: 2019-09-05 | End: 2019-10-07 | Stop reason: HOSPADM

## 2019-09-05 RX ORDER — PANTOPRAZOLE SODIUM 40 MG/1
40 TABLET, DELAYED RELEASE ORAL DAILY
Qty: 30 TABLET | Refills: 11 | Status: ON HOLD | OUTPATIENT
Start: 2019-09-06 | End: 2019-10-07 | Stop reason: SDUPTHER

## 2019-09-05 RX ORDER — L. ACIDOPHILUS/L.BULGARICUS 100MM CELL
1 GRANULES IN PACKET (EA) ORAL 2 TIMES DAILY
Status: ON HOLD | COMMUNITY
Start: 2019-09-05 | End: 2019-10-07 | Stop reason: HOSPADM

## 2019-09-05 RX ORDER — ASPIRIN 81 MG/1
81 TABLET ORAL 2 TIMES DAILY
Refills: 0 | Status: ON HOLD | COMMUNITY
Start: 2019-10-05 | End: 2019-09-18 | Stop reason: HOSPADM

## 2019-09-05 RX ADMIN — PANTOPRAZOLE SODIUM 40 MG: 40 TABLET, DELAYED RELEASE ORAL at 08:09

## 2019-09-05 RX ADMIN — OXYCODONE HYDROCHLORIDE 10 MG: 10 TABLET ORAL at 10:09

## 2019-09-05 RX ADMIN — Medication 10 ML: at 05:09

## 2019-09-05 RX ADMIN — Medication 10 ML: at 01:09

## 2019-09-05 RX ADMIN — OXYCODONE HYDROCHLORIDE 10 MG: 10 TABLET ORAL at 02:09

## 2019-09-05 RX ADMIN — HYDROCODONE BITARTRATE AND ACETAMINOPHEN 1 TABLET: 10; 325 TABLET ORAL at 03:09

## 2019-09-05 RX ADMIN — HYDROCODONE BITARTRATE AND ACETAMINOPHEN 1 TABLET: 10; 325 TABLET ORAL at 01:09

## 2019-09-05 RX ADMIN — HYDROCODONE BITARTRATE AND ACETAMINOPHEN 1 TABLET: 10; 325 TABLET ORAL at 08:09

## 2019-09-05 RX ADMIN — Medication 50 MCG: at 08:09

## 2019-09-05 RX ADMIN — LACTOBACILLUS ACIDOPHILUS / LACTOBACILLUS BULGARICUS 1 EACH: 100 MILLION CFU STRENGTH GRANULES at 08:09

## 2019-09-05 RX ADMIN — Medication 10 ML: at 12:09

## 2019-09-05 RX ADMIN — ASPIRIN 81 MG: 81 TABLET, COATED ORAL at 08:09

## 2019-09-05 RX ADMIN — FERROUS SULFATE TAB EC 325 MG (65 MG FE EQUIVALENT) 325 MG: 325 (65 FE) TABLET DELAYED RESPONSE at 08:09

## 2019-09-05 RX ADMIN — FLUOXETINE 40 MG: 20 CAPSULE ORAL at 08:09

## 2019-09-05 RX ADMIN — ENOXAPARIN SODIUM 40 MG: 100 INJECTION SUBCUTANEOUS at 08:09

## 2019-09-05 NOTE — PROGRESS NOTES
Progress Note  Infectious Disease    Follow-up For:  Right knee post surgical infection    SUBJECTIVE:    09/01/2019 AG Right knee pain 7/10, sharp, intermittent on and off, improved with opiates.  Pressors are off. Hopefully she does not need  Any more pressors tonight. Afebrile;  had one BM    096/02/2019 AG Afebrile. No pressors. No new complaints, expected right knee pain, 5/10, worse with moving, better with rest and opiates, improved associated swelling.   She would like to have her knee brace and get out of bed as her surgeon had recommended. I explained that we could not get her OOB over the past 2 days bc of need of pressors, but hopefully we can do that today    9/3: chart reviewed. Moved out of ICU. Ortho notes reviewed. Desires discharge as soon as able. Unsure of how mobile she is. Eating well. No mouth soreness of abd pain, diarrhea or dysuria. No antibiotic intolerance. picc right arm non tender.   9/4:  She had a good day, walked with therapy, without bending her knee.  Wound is improving per Orthopedics.  No antibiotic intolerance.  She and her daughter are open to skilled nursing which is a relief to me.  No nausea, vomiting, diarrhea, rashes,IV site problems    Antibiotics (From admission, onward)    Start     Stop Route Frequency Ordered    09/01/19 1815  cefTRIAXone (ROCEPHIN) 2 g in dextrose 5 % 50 mL IVPB      -- IV Every 24 hours (non-standard times) 09/01/19 1700        Antifungals (From admission, onward)    None        Antivirals (From admission, onward)    None            EXAM & DIAGNOSTICS REVIEWED:   Vitals:     Temp:  [97 °F (36.1 °C)-98.5 °F (36.9 °C)]   Temp: 98.4 °F (36.9 °C) (09/04/19 1940)  Pulse: 78 (09/04/19 1940)  Resp: 18 (09/04/19 1559)  BP: (!) 83/64 (09/04/19 1940)  SpO2: 95 % (09/04/19 2034)    Intake/Output Summary (Last 24 hours) at 9/4/2019 2101  Last data filed at 9/4/2019 0600  Gross per 24 hour   Intake 1320 ml   Output 300 ml   Net 1020 ml       General:  In NAD.  Alert and attentive, cooperative, comfortable  Eyes:  Anicteric, PERRL, EOMI  ENT:  No ulcers, exudates, thrush, nares patent, voice is hoarse, consistent with his lung smoking history  Neck:  supple,    Lungs:  Clear, no consolidation, rales, wheezes, rub  Heart:  Regular rate and rhythm without gallops  Abd:  Soft, NT, ND, normal BS, no masses or organomegaly appreciated.  Morbidly obese  :  purewick, urine clear, no flank tenderness  Musc:  Excluding right knee Joints without effusion, swelling,  erythema, synovitis,muscle wasting.   Skin:  No rashes.  Wound: 9/3 Lots of bruising, including down to foot. The wound edges are approximated but fragile and irregular. There is substantial bloody drainage. No acute cellulitis and no foul smell.   Neuro:  Alert, attentive, speech fluent, face symmetric, moves all extremities, no focal weakness   Psych:  Calm, cooperative  Extrem: Excluding right leg, No edema, erythema, phlebitis, cellulitis, warm and well perfused  VAD:  RT PICC  Isolation:     Lines/Tubes/Drains:    General Labs reviewed:  Recent Labs   Lab 09/02/19  0350 09/03/19  0322 09/04/19  0525   WBC 10.06 8.91 8.13   HGB 8.8* 9.0* 8.8*   HCT 27.7* 28.5* 28.1*   * 525* 519*       Recent Labs   Lab 09/02/19  0349 09/03/19  0322 09/04/19  0526   * 132* 134*   K 4.3 4.3 4.0    100 100   CO2 24 24 24   BUN 12 10 11   CREATININE 1.0 0.8 0.8   CALCIUM 8.9 9.3 9.4   PROT 5.6* 5.9* 6.1   BILITOT 0.4 0.3 0.3   ALKPHOS 93 111 111   ALT 13 13 12   AST 13 11 12     Recent Labs   Lab 09/01/19  0325 09/02/19 0349 09/03/19  0322   .0* 113.2* 103.4*       Micro:   Microbiology Results (last 7 days)     Procedure Component Value Units Date/Time    Culture, Anaerobe [[id number]] Collected:  08/30/19 1521    Order Status:  Completed Specimen:  Wound from Knee, Right Updated:  09/04/19 1240     Anaerobic Culture No anaerobes isolated    Aerobic culture [[id number]]  (Abnormal)  (Susceptibility)  Collected:  08/29/19 1634    Order Status:  Completed Specimen:  Incision site from Leg, Right Updated:  09/04/19 0642     Aerobic Bacterial Culture PROTEUS MIRABILIS  Many        KLEBSIELLA PNEUMONIAE  Moderate        ESCHERICHIA COLI  Moderate  No other significant isolate      Tissue culture [[id number]]  (Abnormal)  (Susceptibility) Collected:  08/30/19 1521    Order Status:  Completed Specimen:  Wound from Knee, Right Updated:  09/03/19 0946     Aerobic Culture - Tissue PROTEUS MIRABILIS  Few        ESCHERICHIA COLI  Rare  No other significant isolate       Gram Stain Result Rare WBC's      No organisms seen    Aerobic culture [[id number]]  (Abnormal)  (Susceptibility) Collected:  08/30/19 1521    Order Status:  Completed Specimen:  Wound from Knee, Right Updated:  09/02/19 1021     Aerobic Bacterial Culture PROTEUS MIRABILIS  Few        ESCHERICHIA COLI  Few  No other significant isolate            Aerobic culture [[id number]] (Abnormal)  Collected: 08/29/19 1634   Order Status: Completed Specimen: Incision site from Leg, Right Updated: 09/01/19 1223    Aerobic Bacterial Culture PROTEUS MIRABILIS   Many   Abnormal      KLEBSIELLA PNEUMONIAE   Moderate   No other significant isolate   Abnormal    Susceptibility      Proteus mirabilis Klebsiella pneumoniae     CULTURE, AEROBIC  (SPECIFY SOURCE) (Preliminary) CULTURE, AEROBIC  (SPECIFY SOURCE) (Preliminary)     Amox/K Clav'ate <=8/4 mcg/mL Sensitive <=8/4 mcg/mL Sensitive     Amp/Sulbactam <=8/4 mcg/mL Sensitive <=8/4 mcg/mL Sensitive     Ampicillin <=8 mcg/mL Sensitive       Cefazolin <=8 mcg/mL Sensitive <=2 mcg/mL Sensitive     Cefepime <=8 mcg/mL Sensitive <=2 mcg/mL Sensitive     Ceftriaxone <=8 mcg/mL Sensitive <=1 mcg/mL Sensitive     Ciprofloxacin <=1 mcg/mL Sensitive <=1 mcg/mL Sensitive     Gentamicin <=4 mcg/mL Sensitive <=4 mcg/mL Sensitive     Levofloxacin <=2 mcg/mL Sensitive <=2 mcg/mL Sensitive     Piperacillin/Tazo <=16 mcg/mL Sensitive <=16 mcg/mL  Sensitive     Tetracycline   >8 mcg/mL Resistant     Tobramycin <=4 mcg/mL Sensitive <=4 mcg/mL Sensitive     Trimeth/Sulfa <=2/38 mcg/mL Sensitive <=2/38 mcg/mL Sensitive                Imaging Reviewed:   Rt Knee XR   Right knee arthroplasty without immediate complication.   US Rt 08/29/2019  No evidence of deep venous thrombosis in the right lower extremity.   US BL 08/23/2019 No evidence of deep venous thrombosis in either lower extremity.   CXR 08/31  No acute findings.  Right PICC line in place  Knee xr   There is right TKA with the orthopedic hardware appearing in place and intact.    IMPRESSION & PLAN      Proteus and Klebsiella Surgical wound infection.   no knee joint involvement per operative note.       Septic shock postop, resolved   Leukocytosis improved    ---138--113--103. procal 0.08   Expect slow healing bc of multiple comorbidities   Ho COPD, Obesity, HTN, FM, anemia    Smoker advised against        Recommendations:    Ceftriaxone d4 (antibiotic day 6), and if she goes to skilled nursing she can receive another week of this    Follow peripherally.  Taking

## 2019-09-05 NOTE — PT/OT/SLP PROGRESS
"Occupational Therapy   Treatment    Name: Iris Mueller  MRN: [id number]  Admitting Diagnosis:  Septic shock  6 Days Post-Op    Recommendations:     Discharge Recommendations: home health PT, home health OT  Discharge Equipment Recommendations:  grab bar, toilet(hip kit)  Barriers to discharge:       Assessment:     Iris Mueller is a 63 y.o. female with a medical diagnosis of Septic shock.  She presents with irritation that she is being told how to move safely by therapy and then unsafely by other staff; she is compliant with therapy but isn't performing HEP technique accurately. Performance deficits affecting function are weakness, impaired endurance, impaired self care skills, impaired functional mobilty, gait instability, impaired balance, decreased lower extremity function, pain, decreased ROM, orthopedic precautions.     Rehab Prognosis:  Good; patient would benefit from acute skilled OT services to address these deficits and reach maximum level of function.       Plan:     Patient to be seen 4 x/week to address the above listed problems via self-care/home management, therapeutic activities, therapeutic exercises  · Plan of Care Expires: 09/17/19  · Plan of Care Reviewed with: patient, spouse    Subjective     Pain/Comfort:  ·      Objective:     Communicated with: RNVera, prior to session.  Patient found reclined in bedside chair with legs elevated. with PureWick, peripheral IV, telemetry upon OT entry to room.    General Precautions: Standard, fall   Orthopedic Precautions:RLE weight bearing as tolerated   Braces: Knee immobilizer     Occupational Performance:     Activities of Daily Living:  · Lower Body Dressing: stand by assistance to don/doff socks with sock aid.      [facility] 6 Click ADL:      Treatment & Education:  -pt training for use of reacher to dress LB w/ underwear, pants, shorts: while seated upright w/feet touching the ground, dress R leg first, "fishing," for the leg and " pulling up the garment with the reacher until it can be held w/ R hand, then threading the L LE using the reacher if necessary.  -pt v/u and ASUNCION quizzed pt on proper dressing technique and use of reacher and sock aid for dressing.  -ROLAND reviewed orthopedic precautions for R knee w/ pt-- NO BENDING! Use reacher!    Patient left up in chair with recliner out so that legs were elevated. with call button in reach, chair alarm on and  and daughter presentEducation:      GOALS:   Multidisciplinary Problems     Occupational Therapy Goals        Problem: Occupational Therapy Goal    Goal Priority Disciplines Outcome Interventions   Occupational Therapy Goal     OT, PT/OT Ongoing (interventions implemented as appropriate)    Description:  Goals to be met by: 9/17/19     Patient will increase functional independence with ADLs by performing:    LE Dressing with Set-up Assistance, Minimal Assistance and Assistive Devices as needed.  Grooming while standing at sink with Stand-by Assistance and Assistive Devices as needed.  Toileting from toilet with Contact Guard Assistance and Assistive Devices as needed for hygiene and clothing management.   Toilet transfer to toilet with Stand-by Assistance.  Upper extremity exercise program x15 reps per handout, with independence.                      Time Tracking:     OT Date of Treatment: 09/05/19  OT Start Time: 1415  OT Stop Time: 1440  OT Total Time (min): 25 min    Billable Minutes:Self Care/Home Management 25 min       REGINALD Duffy  9/5/2019

## 2019-09-05 NOTE — PT/OT/SLP PROGRESS
Physical Therapy Treatment    Patient Name:  Iris Mueller   MRN:  [id number]    Recommendations:     Discharge Recommendations:  home health PT, home health OT   Discharge Equipment Recommendations: grab bar, toilet(hip kit)   Barriers to discharge: None    Assessment:     Iris Mueller is a 63 y.o. female admitted with a medical diagnosis of Septic shock.  She presents with the following impairments/functional limitations:  weakness, gait instability, decreased upper extremity function, impaired endurance, impaired balance, decreased lower extremity function, decreased safety awareness, impaired skin, orthopedic precautions, pain, impaired self care skills, impaired functional mobilty.  During PT tx, she required Min A for supine<>sit, and Mod A for sit<>Stand with RW.  She was able to ambulate 70ft with RW, CGA and chair follow.      Rehab Prognosis: Good; patient would benefit from acute skilled PT services to address these deficits and reach maximum level of function.    Recent Surgery: Procedure(s) (LRB):  INCISION AND DRAINAGE, LOWER EXTREMITY (Right) 6 Days Post-Op    Plan:     During this hospitalization, patient to be seen daily to address the identified rehab impairments via gait training, therapeutic activities, therapeutic exercises and progress toward the following goals:    · Plan of Care Expires:  09/16/19    Subjective     Chief Complaint: Pt expressed that she was very tired because she has not been given much time to sleep while here.    Patient/Family Comments/goals: none  Pain/Comfort:  · Pain Rating 1: 6/10  · Location - Side 1: Right  · Location 1: knee  · Pain Addressed 1: Reposition, Distraction      Objective:     Communicated with nurse Gamez prior to session.  Patient found HOB elevated with PureWick, peripheral IV, telemetry upon PT entry to room.     General Precautions: Standard, fall   Orthopedic Precautions:RLE weight bearing as tolerated   Braces: Knee  immobilizer     Functional Mobility:  · Bed Mobility:     · Supine to Sit: contact guard assistance  · Transfers:     · Sit to Stand:  moderate assistance with rolling walker  · Gait: 70ft with RW, CGA, and chair follow  · Balance: fair      AM-PAC 6 CLICK MOBILITY          Therapeutic Activities and Exercises:   Upon standing it was noted that pt's brief was soiled, therefore, it was removed, along with the purewick.  Upon return to the room, a new purewick was replaced.     Patient left up in chair with all lines intact, call button in reach, chair alarm on, nurse Vera notified and  present..    GOALS:   Multidisciplinary Problems     Physical Therapy Goals        Problem: Physical Therapy Goal    Goal Priority Disciplines Outcome Goal Variances Interventions   Physical Therapy Goal     PT, PT/OT Ongoing (interventions implemented as appropriate)     Description:  Goals to be met by: 2019     Patient will increase functional independence with mobility by performin. Supine to sit with Contact Guard Assistance  2. Sit to supine with Contact Guard Assistance  3. Sit to stand transfer with Stand-by Assistance  4. Bed to chair transfer with Contact Guard Assistance using Rolling Walker  5. Gait  x 150 feet with Contact Guard Assistance using Rolling Walker.   6. Lower extremity exercise program x10-20 reps per handout, with independence                      Time Tracking:     PT Received On: 19  PT Start Time: 1345     PT Stop Time: 1412  PT Total Time (min): 27 min     Billable Minutes: Gait Training 13 and Therapeutic Activity 14    Treatment Type: Treatment  PT/PTA: PT           Jazmyne Coats, PT  2019

## 2019-09-05 NOTE — PLAN OF CARE
Problem: Adult Inpatient Plan of Care  Goal: Plan of Care Review  Pt aaox3, pt agrees with plan of care, pain managed with prn po pain medicine, fall precautions explained to pt, pt agreed, pure wick in place, dressing to right knee inplace with no drainage. Will continue to monitor.

## 2019-09-05 NOTE — PLAN OF CARE
Problem: Occupational Therapy Goal  Goal: Occupational Therapy Goal  Goals to be met by: 9/17/19     Patient will increase functional independence with ADLs by performing:    LE Dressing with Set-up Assistance, Minimal Assistance and Assistive Devices as needed.  Grooming while standing at sink with Stand-by Assistance and Assistive Devices as needed.  Toileting from toilet with Contact Guard Assistance and Assistive Devices as needed for hygiene and clothing management.   Toilet transfer to toilet with Stand-by Assistance.  Upper extremity exercise program x15 reps per handout, with independence.     Outcome: Ongoing (interventions implemented as appropriate)  Goals remain appropriate.

## 2019-09-05 NOTE — PLAN OF CARE
Xiang spoke with Erlinda at [facility] and called in the LOCET.   [facility] faxed to [phone].  Pending the 142 and PHN authorization.    11:02pm  Xiang received the 142 from Tere Camacho with [facility].  Pending final acceptance from Lily and authorization from [facility].     09/05/19 4319   Discharge Reassessment   Assessment Type Discharge Planning Reassessment   Provided patient/caregiver education on the expected discharge date and the discharge plan Yes   Do you have any problems affording any of your prescribed medications? No   Discharge Plan A Skilled Nursing Facility

## 2019-09-05 NOTE — PLAN OF CARE
Per Rai at [facility]- the pts nurse is going to call Emma after shift change for report. Pts discharge destination booked. Susan Cuevas, DUYEN     09/05/19 1528   Post-Acute Status   Post-Acute Authorization Placement   Post-Acute Placement Status Set-up Complete

## 2019-09-05 NOTE — PLAN OF CARE
Problem: Adult Inpatient Plan of Care  Goal: Plan of Care Review  Outcome: Outcome(s) achieved Date Met: 09/05/19  Report called to magnolia at [facility] at 1326, [facility] driver is here to retrieve pt at this time, telemetry removed, picc line in place, patent, blood return,  pt packed personal belongings per self, denies joint pain/discomfort prior to d/c, right knee immobilizer in place, safety maintain

## 2019-09-05 NOTE — PLAN OF CARE
SNF: [facility]     Date: 9/4/2019 12:19 PM  Created By: Augie Evans  Status: Accepted  Pending authorization from N.       09/05/19 1121   Post-Acute Status   Post-Acute Authorization Placement   Post-Acute Placement Status Set-up Complete

## 2019-09-05 NOTE — PLAN OF CARE
Problem: Adult Inpatient Plan of Care  Goal: Plan of Care Review  Patient sats 95% on RA/not in any distress

## 2019-09-05 NOTE — PLAN OF CARE
Xiang received the authorization # [id number] form Carolyne with [facility].  Xiang updated Dr. Jules for discharge orders for pt to go to [facility].  iXang notified NIVIA Gamez.       09/05/19 1204   Post-Acute Status   Post-Acute Authorization Placement   Post-Acute Placement Status Authorization Obtained

## 2019-09-05 NOTE — DISCHARGE SUMMARY
Discharge Summary  Hospital Medicine    Admit Date: 8/29/2019    Date and Time: 9/5/201912:31 PM    Discharge Attending Physician: Yazmin Jules MD    Primary Care Physician: Elkin You MD    Diagnoses:  Active Hospital Problems    Diagnosis  POA    *Septic shock with postop right knee wound infection status post incision and drainage [A41.9, R65.21] due to E coli, Klebsiella pneumoniae and Proteus mirabilis species  Yes    Acute blood loss anemia [D62]  Yes          Infected incision [T81.49XA]  Yes    Essential hypertension [I10]  Yes    Fibromyalgia [M79.7]  Yes    Morbid obesity [E66.01]  Yes    Osteoarthritis of right knee [M17.11]  Yes     Discharged Condition: Good    Hospital Course:   Patient is a 63 y.o. female admitted to Hospitalist Service from [facility] Emergency Room with complaint of drainage from right knee incision. Patient reportedly has past medical history significant for hypertension and bronchial asthma. Patient underwent right total knee arthroplasty performed by Dr. Chung on August 14, 2019.  Patient presented to the emergency room with complaint of bloody drainage which was foul smelling from right knee incision.  Patient reported she was seen by primary care physician on August 26, 2019 when a hematoma was expressed out of the right knee incision manually.  Patient was started on oral clindamycin 300 mg 3 times daily.  Patient denied any associated fever, chills or any recent trauma to knee joint. Patient denied chest pain, shortness of breath, abdominal pain, nausea, vomiting, headache, vision changes, focal neuro-deficits, cough or fever. Patient was admitted to Hospitalist medicine service. Patient was evaluated by Dr. Chung and Dr. To from Orthopedics and Infectious Disease Medicine.  Patient was treated with intravenous antibiotics.  Patient was taken to operation room for incision and drainage and irrigation of the infected wound.  Wound cultures  grew E coli, Proteus mirabilis and Klebsiella pneumonia species.  Antibiotic coverage tailored according to sensitivity results.  Patient is slow to work with physical therapy.  Patient has been accepted at [facility] nursing [facility] of where patient will receive another week off intravenous antibiotic therapy.  Patient has been counseled and instructed not to bend her knee and use knee immobilizer when needed.  Patient voiced understanding.  Patient will continue close follow-up with her doctors and orthopedic surgeon upon discharge. Patient was discharged to SNF in stable condition with following discharge plan of care.     Consults: Dr. Chung, Dr. To    Significant Diagnostic Studies:   RLE venous doppler scan; No evidence of deep venous thrombosis in the right lower extremity.     CXR: No acute findings.  Right PICC line in place.     Right knee x-ray: There is right TKA with the orthopedic hardware appearing in place and intact.     Microbiology Results (last 7 days)     Procedure Component Value Units Date/Time    Culture, Anaerobe [[id number]] Collected:  08/30/19 1521    Order Status:  Completed Specimen:  Wound from Knee, Right Updated:  09/04/19 1240     Anaerobic Culture No anaerobes isolated    Aerobic culture [[id number]]  (Abnormal)  (Susceptibility) Collected:  08/29/19 1634    Order Status:  Completed Specimen:  Incision site from Leg, Right Updated:  09/04/19 0642     Aerobic Bacterial Culture PROTEUS MIRABILIS  Many        KLEBSIELLA PNEUMONIAE  Moderate        ESCHERICHIA COLI  Moderate  No other significant isolate      Tissue culture [[id number]]  (Abnormal)  (Susceptibility) Collected:  08/30/19 1521    Order Status:  Completed Specimen:  Wound from Knee, Right Updated:  09/03/19 0946     Aerobic Culture - Tissue PROTEUS MIRABILIS  Few        ESCHERICHIA COLI  Rare  No other significant isolate       Gram Stain Result Rare WBC's      No organisms seen    Aerobic culture [[id number]]   (Abnormal)  (Susceptibility) Collected:  08/30/19 1521    Order Status:  Completed Specimen:  Wound from Knee, Right Updated:  09/02/19 1021     Aerobic Bacterial Culture PROTEUS MIRABILIS  Few        ESCHERICHIA COLI  Few  No other significant isolate          Special Treatments/Procedures: as above  Disposition: SNF    Medications:  Reconciled Home Medications:   Current Discharge Medication List      START taking these medications    Details   aspirin 325 MG tablet Take 1 tablet (325 mg total) by mouth 2 (two) times daily.  Refills: 0      cefTRIAXone 2 g in dextrose 5 % 50 mL (ROCEPHIN) 2 g/50 mL PgBk IVPB Inject 50 mLs (2 g total) into the vein once daily. for 7 days  Qty: 7 each, Refills: 0      lactobacillus acidophilus & bulgar (LACTINEX) 100 million cell packet Take 1 packet (1 each total) by mouth 2 (two) times daily. for 15 days      pantoprazole (PROTONIX) 40 MG tablet Take 1 tablet (40 mg total) by mouth once daily.  Qty: 30 tablet, Refills: 11         CONTINUE these medications which have CHANGED    Details   aspirin (ECOTRIN) 81 MG EC tablet Take 1 tablet (81 mg total) by mouth 2 (two) times daily. 2 tablets daily  Refills: 0      oxyCODONE-acetaminophen (PERCOCET)  mg per tablet Take 1 tablet by mouth every 6 (six) hours as needed for Pain.  Qty: 10 tablet, Refills: 0    Comments:           CONTINUE these medications which have NOT CHANGED    Details   ANORO ELLIPTA 62.5-25 mcg/actuation DsDv Inhale 1 puff into the lungs daily as needed (SOB).       cyanocobalamin, vitamin B-12, (VITAMIN B-12) 50 mcg tablet Take 50 mcg by mouth twice a week. Mon Thurs      ferrous sulfate (FEOSOL) 325 mg (65 mg iron) Tab tablet Take 325 mg by mouth twice a week. Mon Thurs      FLUoxetine 40 MG capsule Take 40 mg by mouth once daily.       fluticasone (FLONASE) 50 mcg/actuation nasal spray 1 spray by Each Nostril route every evening.       lisinopril-hydrochlorothiazide (PRINZIDE,ZESTORETIC) 20-25 mg Tab Take 1  tablet by mouth once daily.      lovastatin (MEVACOR) 20 MG tablet Take 20 mg by mouth every evening.      traZODone (DESYREL) 50 MG tablet Take 50 mg by mouth every evening.      VENTOLIN HFA 90 mcg/actuation inhaler Inhale 1 puff into the lungs daily as needed for Wheezing or Shortness of Breath.          STOP taking these medications       clindamycin (CLEOCIN) 300 MG capsule Comments:   Reason for Stopping:             Discharge Procedure Orders   Diet Cardiac     Other restrictions (specify):   Order Comments: PLEASE OBSERVE FALL PRECAUTIONS.  Continue PT as tolerated without bending knee to protect wound edges, use brace as needed.     Call MD for:   Order Comments: For worsening symptoms, chest pain, shortness of breath, increased abdominal pain, high grade fever, stroke or stroke like symptoms, immediately go to the nearest Emergency Room or call 911 as soon as possible.     Change dressing (specify)   Order Comments: Dressing change: Consult wound care nurse.    Use daily wound cleanser and cover with dressing.     Follow-up Information     Elkin You MD In 2 weeks.    Specialty:  Family Medicine  Contact information:  [address]  [address]  [facility]  [place] [address]  [phone]             Delio Chung MD In 2 weeks.    Specialties:  Sports Medicine, Orthopedic Surgery  Contact information:  [address] [place] [address]  [address]  [place] [address]  [phone]               Time spent on the discharge of patient: 32 minutes  Patient was seen and examined on the date of discharge and determined to be suitable for discharge.

## 2019-09-05 NOTE — PLAN OF CARE
Xiang spoke with Britney at [facility].  They are in the middle of Shift Change, the nurse will call Vera for report.  She has Vera's number.  Just in case, [phone]. Thanks.     09/05/19 1529   Final Note   Assessment Type Final Discharge Note   Anticipated Discharge Disposition SNF  ([facility])

## 2019-09-05 NOTE — PLAN OF CARE
09/05/19 1404   Medicare Message   Important Message from Medicare regarding Discharge Appeal Rights Explained to patient/caregiver   Date IMM was signed 09/05/19   Time IMM was signed 1404

## 2019-09-05 NOTE — PLAN OF CARE
Problem: Physical Therapy Goal  Goal: Physical Therapy Goal  Goals to be met by: 2019     Patient will increase functional independence with mobility by performin. Supine to sit with Contact Guard Assistance  2. Sit to supine with Contact Guard Assistance  3. Sit to stand transfer with Stand-by Assistance  4. Bed to chair transfer with Contact Guard Assistance using Rolling Walker  5. Gait  x 150 feet with Contact Guard Assistance using Rolling Walker.   6. Lower extremity exercise program x10-20 reps per handout, with independence     Outcome: Ongoing (interventions implemented as appropriate)  Pt ambulated 70ft with RW and CGA

## 2019-09-09 ENCOUNTER — PATIENT MESSAGE (OUTPATIENT)
Dept: ORTHOPEDICS | Facility: CLINIC | Age: 64
End: 2019-09-09

## 2019-09-09 DIAGNOSIS — F32.0 MAJOR DEPRESSIVE DISORDER, SINGLE EPISODE, MILD: Primary | ICD-10-CM

## 2019-09-09 RX ORDER — BUPROPION HYDROCHLORIDE 75 MG/1
1 TABLET ORAL 2 TIMES DAILY
Refills: 1 | COMMUNITY
Start: 2019-09-06 | End: 2019-09-09 | Stop reason: SDUPTHER

## 2019-09-09 RX ORDER — BUPROPION HYDROCHLORIDE 75 MG/1
75 TABLET ORAL 2 TIMES DAILY
Qty: 180 TABLET | Refills: 1 | Status: ON HOLD | OUTPATIENT
Start: 2019-09-09 | End: 2019-10-07 | Stop reason: SDUPTHER

## 2019-09-09 NOTE — TELEPHONE ENCOUNTER
----- Message from Amaya Porter sent at 9/9/2019  3:12 PM CDT -----  Contact: delores from [facility]  Refill Buproprion 75 mg. [facility] .Please fax a Rx to [facility] and the pharm.  Delores # [phone] fax # [phone]

## 2019-09-10 ENCOUNTER — OFFICE VISIT (OUTPATIENT)
Dept: ORTHOPEDICS | Facility: CLINIC | Age: 64
End: 2019-09-10
Payer: MEDICARE

## 2019-09-10 ENCOUNTER — PATIENT MESSAGE (OUTPATIENT)
Dept: ORTHOPEDICS | Facility: CLINIC | Age: 64
End: 2019-09-10

## 2019-09-10 VITALS
HEIGHT: 62 IN | SYSTOLIC BLOOD PRESSURE: 128 MMHG | BODY MASS INDEX: 52.7 KG/M2 | HEART RATE: 73 BPM | DIASTOLIC BLOOD PRESSURE: 64 MMHG | WEIGHT: 286.38 LBS

## 2019-09-10 DIAGNOSIS — Z01.818 PRE-OP EXAM: ICD-10-CM

## 2019-09-10 DIAGNOSIS — M00.9 INFECTION OF RIGHT KNEE: Primary | ICD-10-CM

## 2019-09-10 PROCEDURE — 99999 PR PBB SHADOW E&M-EST. PATIENT-LVL III: CPT | Mod: PBBFAC,,, | Performed by: ORTHOPAEDIC SURGERY

## 2019-09-10 PROCEDURE — 99214 OFFICE O/P EST MOD 30 MIN: CPT | Mod: 57,S$GLB,, | Performed by: ORTHOPAEDIC SURGERY

## 2019-09-10 PROCEDURE — 1101F PR PT FALLS ASSESS DOC 0-1 FALLS W/OUT INJ PAST YR: ICD-10-PCS | Mod: CPTII,S$GLB,, | Performed by: ORTHOPAEDIC SURGERY

## 2019-09-10 PROCEDURE — 99214 PR OFFICE/OUTPT VISIT, EST, LEVL IV, 30-39 MIN: ICD-10-PCS | Mod: 57,S$GLB,, | Performed by: ORTHOPAEDIC SURGERY

## 2019-09-10 PROCEDURE — 3008F PR BODY MASS INDEX (BMI) DOCUMENTED: ICD-10-PCS | Mod: CPTII,S$GLB,, | Performed by: ORTHOPAEDIC SURGERY

## 2019-09-10 PROCEDURE — 99999 PR PBB SHADOW E&M-EST. PATIENT-LVL III: ICD-10-PCS | Mod: PBBFAC,,, | Performed by: ORTHOPAEDIC SURGERY

## 2019-09-10 PROCEDURE — 1101F PT FALLS ASSESS-DOCD LE1/YR: CPT | Mod: CPTII,S$GLB,, | Performed by: ORTHOPAEDIC SURGERY

## 2019-09-10 PROCEDURE — 3008F BODY MASS INDEX DOCD: CPT | Mod: CPTII,S$GLB,, | Performed by: ORTHOPAEDIC SURGERY

## 2019-09-11 ENCOUNTER — ANESTHESIA EVENT (OUTPATIENT)
Dept: SURGERY | Facility: HOSPITAL | Age: 64
DRG: 856 | End: 2019-09-11
Payer: MEDICARE

## 2019-09-11 RX ORDER — SODIUM CHLORIDE 9 MG/ML
INJECTION, SOLUTION INTRAVENOUS CONTINUOUS
Status: CANCELLED | OUTPATIENT
Start: 2019-09-11

## 2019-09-11 RX ORDER — MUPIROCIN 20 MG/G
OINTMENT TOPICAL
Status: CANCELLED | OUTPATIENT
Start: 2019-09-11

## 2019-09-11 NOTE — PRE-PROCEDURE INSTRUCTIONS
PHONE PRE-OP WAS DONE.  PATIENT'S NURSE KAUSHAL AT [facility] VERBALIZED UNDERSTANDING OF INSTRUCTIONS AND EDUCATION.  PATIENT WAS GIVEN ARRIVAL TIME OF 1030AM.

## 2019-09-11 NOTE — PROGRESS NOTES
Past Medical History:   Diagnosis Date    Asthma     Encounter for blood transfusion     Hypertension        Past Surgical History:   Procedure Laterality Date    ARTHROPLASTY, KNEE Right 2019    [facility] at [facility] OR     SECTION      INCISION AND DRAINAGE, LOWER EXTREMITY Right 2019    Performed by [facility] at [facility] OR    TONSILLECTOMY         Current Outpatient Medications   Medication Sig    ANORO ELLIPTA 62.5-25 mcg/actuation DsDv Inhale 1 puff into the lungs daily as needed (SOB).     [START ON 10/5/2019] aspirin (ECOTRIN) 81 MG EC tablet Take 1 tablet (81 mg total) by mouth 2 (two) times daily. 2 tablets daily    aspirin 325 MG tablet Take 1 tablet (325 mg total) by mouth 2 (two) times daily.    buPROPion (WELLBUTRIN) 75 MG tablet Take 1 tablet (75 mg total) by mouth 2 (two) times daily.    cefTRIAXone 2 g in dextrose 5 % 50 mL (ROCEPHIN) 2 g/50 mL PgBk IVPB Inject 50 mLs (2 g total) into the vein once daily. for 7 days    cyanocobalamin, vitamin B-12, (VITAMIN B-12) 50 mcg tablet Take 50 mcg by mouth twice a week.     ferrous sulfate (FEOSOL) 325 mg (65 mg iron) Tab tablet Take 325 mg by mouth twice a week.     FLUoxetine 40 MG capsule Take 40 mg by mouth once daily.     fluticasone (FLONASE) 50 mcg/actuation nasal spray 1 spray by Each Nostril route every evening.     lactobacillus acidophilus & bulgar (LACTINEX) 100 million cell packet Take 1 packet (1 each total) by mouth 2 (two) times daily. for 15 days    lisinopril-hydrochlorothiazide (PRINZIDE,ZESTORETIC) 20-25 mg Tab Take 1 tablet by mouth once daily.    lovastatin (MEVACOR) 20 MG tablet Take 20 mg by mouth every evening.    oxyCODONE-acetaminophen (PERCOCET)  mg per tablet Take 1 tablet by mouth every 6 (six) hours as needed for Pain.    pantoprazole (PROTONIX) 40 MG tablet Take 1 tablet (40 mg total) by mouth once daily.    traZODone (DESYREL) 50 MG  tablet Take 50 mg by mouth every evening.    VENTOLIN HFA 90 mcg/actuation inhaler Inhale 1 puff into the lungs daily as needed for Wheezing or Shortness of Breath.      No current facility-administered medications for this visit.        Review of patient's allergies indicates:  No Known Allergies    Family History   Problem Relation Age of Onset    Alzheimer's disease Mother        Social History     Socioeconomic History    Marital status:      Spouse name: Not on file    Number of children: Not on file    Years of education: Not on file    Highest education level: Not on file   Occupational History    Not on file   Social Needs    Financial resource strain: Not on file    Food insecurity:     Worry: Not on file     Inability: Not on file    Transportation needs:     Medical: Not on file     Non-medical: Not on file   Tobacco Use    Smoking status: Current Every Day Smoker     Packs/day: 0.50     Years: 30.00     Pack years: 15.00     Types: Cigarettes    Smokeless tobacco: Never Used   Substance and Sexual Activity    Alcohol use: Yes     Comment: RARELY    Drug use: Never    Sexual activity: Not on file   Lifestyle    Physical activity:     Days per week: Not on file     Minutes per session: Not on file    Stress: Not on file   Relationships    Social connections:     Talks on phone: Not on file     Gets together: Not on file     Attends Anglican service: Not on file     Active member of club or organization: Not on file     Attends meetings of clubs or organizations: Not on file     Relationship status: Not on file   Other Topics Concern    Not on file   Social History Narrative    Not on file       Chief Complaint:   Chief Complaint   Patient presents with    Post-op Evaluation     I & D RIGHT KNEE       Consulting Physician: No ref. provider found    History of present illness: This is a 63 y.o. female following up for right knee incision and debridement on 08/30/2019 following  earlier total knee arthroplasty.      Review of Systems:    Constitution: Denies chills, fever, and sweats.    HENT: Denies headaches or blurry vision.    Cardiovascular: Denies chest pain or irregular heart beat.    Respiratory: Denies cough or shortness of breath.    Gastrointestinal: Denies abdominal pain, nausea, or vomiting.    Musculoskeletal:  Denies muscle cramps.    Neurological: Denies dizziness or focal weakness.    Psychiatric/Behavioral: Normal mental status.    Hematologic/Lymphatic: Denies bleeding problem or easy bruising/bleeding.    Skin: Denies rash or suspicious lesions.      Examination:    Vital Signs:    Vitals:    09/10/19 1354   BP: 128/64   Pulse: 73       Body mass index is 52.38 kg/m².    This a well-developed, well nourished patient in no acute distress.    Alert and oriented and cooperative to examination.       Physical Exam:  Right knee    Inspection/Observation   Swelling:   mild  Erythema:   Minimal  Bruising:   none  Wounds:   Open with fibrinous material and broken sutures  Drainage:  None    Range of Motion   Limited    Muscle Strength   Limited    Other   Sensation:  normal  Pulse:    palpable    Imaging:      Assessment: Infection of right knee  -     Case Request Operating Room: INCISION AND DRAINAGE, LOWER EXTREMITY, REPLACEMENT, WOUND VAC    Pre-op exam  -     Case Request Operating Room: INCISION AND DRAINAGE, LOWER EXTREMITY, REPLACEMENT, WOUND VAC        Plan:  Her sutures have pulled through and her wound is gaping.  We discussed treatment options and I told her that we recommended a re-1 shot of the knee and placement of a wound VAC.  I told her it is unlikely that we will be able to close the skin due to the tightness and poor quality of the tissue.  We will also obtain a consult for her with Plastic surgery to see if she will need a flap.  We will have her continue her IV antibiotics per Infectious Disease.    After discussing the diagnosis and reviewing treatment  options, the patient/family elected to proceed with surgical intervention.    In preparation for surgery:    Appropriate pre-operative labs were ordered.    Pertinent risk factors and comorbidities for surgery were identified and reviewed, including smoking and HTN.    The risks, benefits, and alternatives to the procedure were explained to the patient/family including, but not limited to: incomplete pain relief, surgical failure, hardware failure, need for further procedures, damage to nerves, arteries, blood vessels and other structures, infection, Deep Vein Thrombosis (DVT), Pulmonary Embolus (PE), Complex Regional Pain Syndrome as well as general anesthetic complications including seizure, stroke, heart attack and even death. The patient/family understood these risks and wished to proceed and signed the informed consent. All questions were answered. No guarantees were implied or stated.    We will obtain the necessary clearances and schedule the patient for surgery.          DISCLAIMER: This note may have been dictated using voice recognition software and may contain grammatical errors. Report sent to referring provider as required.

## 2019-09-11 NOTE — H&P (VIEW-ONLY)
Past Medical History:   Diagnosis Date    Asthma     Encounter for blood transfusion     Hypertension        Past Surgical History:   Procedure Laterality Date    ARTHROPLASTY, KNEE Right 2019    [facility] OR     SECTION      INCISION AND DRAINAGE, LOWER EXTREMITY Right 2019    Performed by [facility] at [facility] OR    TONSILLECTOMY         Current Outpatient Medications   Medication Sig    ANORO ELLIPTA 62.5-25 mcg/actuation DsDv Inhale 1 puff into the lungs daily as needed (SOB).     [START ON 10/5/2019] aspirin (ECOTRIN) 81 MG EC tablet Take 1 tablet (81 mg total) by mouth 2 (two) times daily. 2 tablets daily    aspirin 325 MG tablet Take 1 tablet (325 mg total) by mouth 2 (two) times daily.    buPROPion (WELLBUTRIN) 75 MG tablet Take 1 tablet (75 mg total) by mouth 2 (two) times daily.    cefTRIAXone 2 g in dextrose 5 % 50 mL (ROCEPHIN) 2 g/50 mL PgBk IVPB Inject 50 mLs (2 g total) into the vein once daily. for 7 days    cyanocobalamin, vitamin B-12, (VITAMIN B-12) 50 mcg tablet Take 50 mcg by mouth twice a week.     ferrous sulfate (FEOSOL) 325 mg (65 mg iron) Tab tablet Take 325 mg by mouth twice a week.     FLUoxetine 40 MG capsule Take 40 mg by mouth once daily.     fluticasone (FLONASE) 50 mcg/actuation nasal spray 1 spray by Each Nostril route every evening.     lactobacillus acidophilus & bulgar (LACTINEX) 100 million cell packet Take 1 packet (1 each total) by mouth 2 (two) times daily. for 15 days    lisinopril-hydrochlorothiazide (PRINZIDE,ZESTORETIC) 20-25 mg Tab Take 1 tablet by mouth once daily.    lovastatin (MEVACOR) 20 MG tablet Take 20 mg by mouth every evening.    oxyCODONE-acetaminophen (PERCOCET)  mg per tablet Take 1 tablet by mouth every 6 (six) hours as needed for Pain.    pantoprazole (PROTONIX) 40 MG tablet Take 1 tablet (40 mg total) by mouth once daily.    traZODone (DESYREL) 50 MG  tablet Take 50 mg by mouth every evening.    VENTOLIN HFA 90 mcg/actuation inhaler Inhale 1 puff into the lungs daily as needed for Wheezing or Shortness of Breath.      No current facility-administered medications for this visit.        Review of patient's allergies indicates:  No Known Allergies    Family History   Problem Relation Age of Onset    Alzheimer's disease Mother        Social History     Socioeconomic History    Marital status:      Spouse name: Not on file    Number of children: Not on file    Years of education: Not on file    Highest education level: Not on file   Occupational History    Not on file   Social Needs    Financial resource strain: Not on file    Food insecurity:     Worry: Not on file     Inability: Not on file    Transportation needs:     Medical: Not on file     Non-medical: Not on file   Tobacco Use    Smoking status: Current Every Day Smoker     Packs/day: 0.50     Years: 30.00     Pack years: 15.00     Types: Cigarettes    Smokeless tobacco: Never Used   Substance and Sexual Activity    Alcohol use: Yes     Comment: RARELY    Drug use: Never    Sexual activity: Not on file   Lifestyle    Physical activity:     Days per week: Not on file     Minutes per session: Not on file    Stress: Not on file   Relationships    Social connections:     Talks on phone: Not on file     Gets together: Not on file     Attends Religion service: Not on file     Active member of club or organization: Not on file     Attends meetings of clubs or organizations: Not on file     Relationship status: Not on file   Other Topics Concern    Not on file   Social History Narrative    Not on file       Chief Complaint:   Chief Complaint   Patient presents with    Post-op Evaluation     I & D RIGHT KNEE       Consulting Physician: No ref. provider found    History of present illness: This is a 63 y.o. female following up for right knee incision and debridement on 08/30/2019 following  earlier total knee arthroplasty.      Review of Systems:    Constitution: Denies chills, fever, and sweats.    HENT: Denies headaches or blurry vision.    Cardiovascular: Denies chest pain or irregular heart beat.    Respiratory: Denies cough or shortness of breath.    Gastrointestinal: Denies abdominal pain, nausea, or vomiting.    Musculoskeletal:  Denies muscle cramps.    Neurological: Denies dizziness or focal weakness.    Psychiatric/Behavioral: Normal mental status.    Hematologic/Lymphatic: Denies bleeding problem or easy bruising/bleeding.    Skin: Denies rash or suspicious lesions.      Examination:    Vital Signs:    Vitals:    09/10/19 1354   BP: 128/64   Pulse: 73       Body mass index is 52.38 kg/m².    This a well-developed, well nourished patient in no acute distress.    Alert and oriented and cooperative to examination.       Physical Exam:  Right knee    Inspection/Observation   Swelling:   mild  Erythema:   Minimal  Bruising:   none  Wounds:   Open with fibrinous material and broken sutures  Drainage:  None    Range of Motion   Limited    Muscle Strength   Limited    Other   Sensation:  normal  Pulse:    palpable    Imaging:      Assessment: Infection of right knee  -     Case Request Operating Room: INCISION AND DRAINAGE, LOWER EXTREMITY, REPLACEMENT, WOUND VAC    Pre-op exam  -     Case Request Operating Room: INCISION AND DRAINAGE, LOWER EXTREMITY, REPLACEMENT, WOUND VAC        Plan:  Her sutures have pulled through and her wound is gaping.  We discussed treatment options and I told her that we recommended a re-1 shot of the knee and placement of a wound VAC.  I told her it is unlikely that we will be able to close the skin due to the tightness and poor quality of the tissue.  We will also obtain a consult for her with Plastic surgery to see if she will need a flap.  We will have her continue her IV antibiotics per Infectious Disease.    After discussing the diagnosis and reviewing treatment  options, the patient/family elected to proceed with surgical intervention.    In preparation for surgery:    Appropriate pre-operative labs were ordered.    Pertinent risk factors and comorbidities for surgery were identified and reviewed, including smoking and HTN.    The risks, benefits, and alternatives to the procedure were explained to the patient/family including, but not limited to: incomplete pain relief, surgical failure, hardware failure, need for further procedures, damage to nerves, arteries, blood vessels and other structures, infection, Deep Vein Thrombosis (DVT), Pulmonary Embolus (PE), Complex Regional Pain Syndrome as well as general anesthetic complications including seizure, stroke, heart attack and even death. The patient/family understood these risks and wished to proceed and signed the informed consent. All questions were answered. No guarantees were implied or stated.    We will obtain the necessary clearances and schedule the patient for surgery.          DISCLAIMER: This note may have been dictated using voice recognition software and may contain grammatical errors. Report sent to referring provider as required.

## 2019-09-12 ENCOUNTER — ANESTHESIA (OUTPATIENT)
Dept: SURGERY | Facility: HOSPITAL | Age: 64
DRG: 856 | End: 2019-09-12
Payer: MEDICARE

## 2019-09-12 ENCOUNTER — HOSPITAL ENCOUNTER (INPATIENT)
Facility: HOSPITAL | Age: 64
LOS: 6 days | Discharge: NURSING, REHAB OR LONG-TERM CARE | DRG: 856 | End: 2019-09-18
Attending: ORTHOPAEDIC SURGERY | Admitting: ORTHOPAEDIC SURGERY
Payer: MEDICARE

## 2019-09-12 ENCOUNTER — OUTSIDE PLACE OF SERVICE (OUTPATIENT)
Dept: INFECTIOUS DISEASES | Facility: CLINIC | Age: 64
End: 2019-09-12
Payer: MEDICARE

## 2019-09-12 DIAGNOSIS — I10 ESSENTIAL HYPERTENSION: Primary | ICD-10-CM

## 2019-09-12 DIAGNOSIS — M00.9 INFECTION OF RIGHT KNEE: ICD-10-CM

## 2019-09-12 DIAGNOSIS — R06.09 DOE (DYSPNEA ON EXERTION): ICD-10-CM

## 2019-09-12 DIAGNOSIS — T81.31XD SURGICAL WOUND DEHISCENCE, SUBSEQUENT ENCOUNTER: ICD-10-CM

## 2019-09-12 DIAGNOSIS — E66.01 OBESITY, CLASS III, BMI 40-49.9 (MORBID OBESITY): ICD-10-CM

## 2019-09-12 DIAGNOSIS — Z01.818 PRE-OP EXAM: ICD-10-CM

## 2019-09-12 DIAGNOSIS — N18.30 CHRONIC KIDNEY DISEASE, STAGE III (MODERATE): ICD-10-CM

## 2019-09-12 PROBLEM — D75.839 THROMBOCYTOSIS: Status: ACTIVE | Noted: 2019-09-12

## 2019-09-12 PROBLEM — D64.9 NORMOCYTIC ANEMIA: Status: ACTIVE | Noted: 2019-09-12

## 2019-09-12 LAB
ANION GAP SERPL CALC-SCNC: 11 MMOL/L (ref 8–16)
ANION GAP SERPL CALC-SCNC: 9 MMOL/L (ref 8–16)
BASOPHILS # BLD AUTO: 0.02 K/UL (ref 0–0.2)
BASOPHILS # BLD AUTO: 0.03 K/UL (ref 0–0.2)
BASOPHILS NFR BLD: 0.2 % (ref 0–1.9)
BASOPHILS NFR BLD: 0.4 % (ref 0–1.9)
BILIRUB UR QL STRIP: NEGATIVE
BUN SERPL-MCNC: 16 MG/DL (ref 8–23)
BUN SERPL-MCNC: 18 MG/DL (ref 8–23)
CALCIUM SERPL-MCNC: 9.2 MG/DL (ref 8.7–10.5)
CALCIUM SERPL-MCNC: 9.3 MG/DL (ref 8.7–10.5)
CHLORIDE SERPL-SCNC: 101 MMOL/L (ref 95–110)
CHLORIDE SERPL-SCNC: 99 MMOL/L (ref 95–110)
CLARITY UR: CLEAR
CO2 SERPL-SCNC: 24 MMOL/L (ref 23–29)
CO2 SERPL-SCNC: 26 MMOL/L (ref 23–29)
COLOR UR: YELLOW
CREAT SERPL-MCNC: 1 MG/DL (ref 0.5–1.4)
CREAT SERPL-MCNC: 1.1 MG/DL (ref 0.5–1.4)
DIFFERENTIAL METHOD: ABNORMAL
DIFFERENTIAL METHOD: ABNORMAL
EOSINOPHIL # BLD AUTO: 0 K/UL (ref 0–0.5)
EOSINOPHIL # BLD AUTO: 0.3 K/UL (ref 0–0.5)
EOSINOPHIL NFR BLD: 0.2 % (ref 0–8)
EOSINOPHIL NFR BLD: 3.8 % (ref 0–8)
ERYTHROCYTE [DISTWIDTH] IN BLOOD BY AUTOMATED COUNT: 15.5 % (ref 11.5–14.5)
ERYTHROCYTE [DISTWIDTH] IN BLOOD BY AUTOMATED COUNT: 15.5 % (ref 11.5–14.5)
EST. GFR  (AFRICAN AMERICAN): >60 ML/MIN/1.73 M^2
EST. GFR  (AFRICAN AMERICAN): >60 ML/MIN/1.73 M^2
EST. GFR  (NON AFRICAN AMERICAN): 53 ML/MIN/1.73 M^2
EST. GFR  (NON AFRICAN AMERICAN): 59 ML/MIN/1.73 M^2
GLUCOSE SERPL-MCNC: 169 MG/DL (ref 70–110)
GLUCOSE SERPL-MCNC: 99 MG/DL (ref 70–110)
GLUCOSE UR QL STRIP: NEGATIVE
HCT VFR BLD AUTO: 30.8 % (ref 37–48.5)
HCT VFR BLD AUTO: 31.3 % (ref 37–48.5)
HGB BLD-MCNC: 9.4 G/DL (ref 12–16)
HGB BLD-MCNC: 9.6 G/DL (ref 12–16)
HGB UR QL STRIP: NEGATIVE
IMM GRANULOCYTES # BLD AUTO: 0.06 K/UL (ref 0–0.04)
IMM GRANULOCYTES # BLD AUTO: 0.07 K/UL (ref 0–0.04)
KETONES UR QL STRIP: NEGATIVE
LEUKOCYTE ESTERASE UR QL STRIP: NEGATIVE
LYMPHOCYTES # BLD AUTO: 0.4 K/UL (ref 1–4.8)
LYMPHOCYTES # BLD AUTO: 1.1 K/UL (ref 1–4.8)
LYMPHOCYTES NFR BLD: 13.3 % (ref 18–48)
LYMPHOCYTES NFR BLD: 3.3 % (ref 18–48)
MCH RBC QN AUTO: 29.2 PG (ref 27–31)
MCH RBC QN AUTO: 29.9 PG (ref 27–31)
MCHC RBC AUTO-ENTMCNC: 30.5 G/DL (ref 32–36)
MCHC RBC AUTO-ENTMCNC: 30.7 G/DL (ref 32–36)
MCV RBC AUTO: 96 FL (ref 82–98)
MCV RBC AUTO: 98 FL (ref 82–98)
MONOCYTES # BLD AUTO: 0.1 K/UL (ref 0.3–1)
MONOCYTES # BLD AUTO: 0.5 K/UL (ref 0.3–1)
MONOCYTES NFR BLD: 0.9 % (ref 4–15)
MONOCYTES NFR BLD: 6.1 % (ref 4–15)
NEUTROPHILS # BLD AUTO: 11.1 K/UL (ref 1.8–7.7)
NEUTROPHILS # BLD AUTO: 6 K/UL (ref 1.8–7.7)
NEUTROPHILS NFR BLD: 75.5 % (ref 38–73)
NEUTROPHILS NFR BLD: 94.9 % (ref 38–73)
NITRITE UR QL STRIP: NEGATIVE
NRBC BLD-RTO: 0 /100 WBC
NRBC BLD-RTO: 0 /100 WBC
PH UR STRIP: 7 [PH] (ref 5–8)
PLATELET # BLD AUTO: 429 K/UL (ref 150–350)
PLATELET # BLD AUTO: 538 K/UL (ref 150–350)
PMV BLD AUTO: 8.4 FL (ref 9.2–12.9)
PMV BLD AUTO: 8.5 FL (ref 9.2–12.9)
POTASSIUM SERPL-SCNC: 4 MMOL/L (ref 3.5–5.1)
POTASSIUM SERPL-SCNC: 4.1 MMOL/L (ref 3.5–5.1)
PROT UR QL STRIP: NEGATIVE
RBC # BLD AUTO: 3.21 M/UL (ref 4–5.4)
RBC # BLD AUTO: 3.22 M/UL (ref 4–5.4)
SODIUM SERPL-SCNC: 134 MMOL/L (ref 136–145)
SODIUM SERPL-SCNC: 136 MMOL/L (ref 136–145)
SP GR UR STRIP: 1.01 (ref 1–1.03)
URN SPEC COLLECT METH UR: NORMAL
UROBILINOGEN UR STRIP-ACNC: NEGATIVE EU/DL
WBC # BLD AUTO: 11.69 K/UL (ref 3.9–12.7)
WBC # BLD AUTO: 7.97 K/UL (ref 3.9–12.7)

## 2019-09-12 PROCEDURE — 63600175 PHARM REV CODE 636 W HCPCS: Performed by: INTERNAL MEDICINE

## 2019-09-12 PROCEDURE — 36415 COLL VENOUS BLD VENIPUNCTURE: CPT

## 2019-09-12 PROCEDURE — 36000704 HC OR TIME LEV I 1ST 15 MIN: Performed by: ORTHOPAEDIC SURGERY

## 2019-09-12 PROCEDURE — 10180 PR COMPLEX DRAINAGE, WOUND: ICD-10-PCS | Mod: 78,,, | Performed by: ORTHOPAEDIC SURGERY

## 2019-09-12 PROCEDURE — 25000003 PHARM REV CODE 250: Performed by: ANESTHESIOLOGY

## 2019-09-12 PROCEDURE — 63600175 PHARM REV CODE 636 W HCPCS: Performed by: ORTHOPAEDIC SURGERY

## 2019-09-12 PROCEDURE — 99222 PR INITIAL HOSPITAL CARE,LEVL II: ICD-10-PCS | Mod: S$GLB,,, | Performed by: INTERNAL MEDICINE

## 2019-09-12 PROCEDURE — 25000003 PHARM REV CODE 250: Performed by: INTERNAL MEDICINE

## 2019-09-12 PROCEDURE — D9220A PRA ANESTHESIA: Mod: CRNA,,, | Performed by: NURSE ANESTHETIST, CERTIFIED REGISTERED

## 2019-09-12 PROCEDURE — 97605 PR NEG PRESS WOUND THERAPY (NPWT) W/NON-DISPOSABLE WOUND VAC DEVICE (DME), <=50 CM: ICD-10-PCS | Mod: ,,, | Performed by: ORTHOPAEDIC SURGERY

## 2019-09-12 PROCEDURE — 87077 CULTURE AEROBIC IDENTIFY: CPT | Mod: 59

## 2019-09-12 PROCEDURE — 97605 NEG PRS WND THER DME<=50SQCM: CPT | Mod: ,,, | Performed by: ORTHOPAEDIC SURGERY

## 2019-09-12 PROCEDURE — 99900035 HC TECH TIME PER 15 MIN (STAT)

## 2019-09-12 PROCEDURE — 12000002 HC ACUTE/MED SURGE SEMI-PRIVATE ROOM

## 2019-09-12 PROCEDURE — 99222 1ST HOSP IP/OBS MODERATE 55: CPT | Mod: S$GLB,,, | Performed by: INTERNAL MEDICINE

## 2019-09-12 PROCEDURE — D9220A PRA ANESTHESIA: Mod: ANES,,, | Performed by: ANESTHESIOLOGY

## 2019-09-12 PROCEDURE — 87070 CULTURE OTHR SPECIMN AEROBIC: CPT

## 2019-09-12 PROCEDURE — 85025 COMPLETE CBC W/AUTO DIFF WBC: CPT | Mod: 91

## 2019-09-12 PROCEDURE — 87186 SC STD MICRODIL/AGAR DIL: CPT | Mod: 59

## 2019-09-12 PROCEDURE — 63600175 PHARM REV CODE 636 W HCPCS: Performed by: NURSE ANESTHETIST, CERTIFIED REGISTERED

## 2019-09-12 PROCEDURE — 36000705 HC OR TIME LEV I EA ADD 15 MIN: Performed by: ORTHOPAEDIC SURGERY

## 2019-09-12 PROCEDURE — 37000009 HC ANESTHESIA EA ADD 15 MINS: Performed by: ORTHOPAEDIC SURGERY

## 2019-09-12 PROCEDURE — 99900103 DSU ONLY-NO CHARGE-INITIAL HR (STAT): Performed by: ORTHOPAEDIC SURGERY

## 2019-09-12 PROCEDURE — 25000003 PHARM REV CODE 250: Performed by: NURSE ANESTHETIST, CERTIFIED REGISTERED

## 2019-09-12 PROCEDURE — 80048 BASIC METABOLIC PNL TOTAL CA: CPT

## 2019-09-12 PROCEDURE — 10180 I&D COMPLEX PO WOUND INFCTJ: CPT | Mod: 78,,, | Performed by: ORTHOPAEDIC SURGERY

## 2019-09-12 PROCEDURE — 63600175 PHARM REV CODE 636 W HCPCS: Performed by: ANESTHESIOLOGY

## 2019-09-12 PROCEDURE — 83540 ASSAY OF IRON: CPT

## 2019-09-12 PROCEDURE — 94760 N-INVAS EAR/PLS OXIMETRY 1: CPT

## 2019-09-12 PROCEDURE — D9220A PRA ANESTHESIA: ICD-10-PCS | Mod: ANES,,, | Performed by: ANESTHESIOLOGY

## 2019-09-12 PROCEDURE — 87075 CULTR BACTERIA EXCEPT BLOOD: CPT

## 2019-09-12 PROCEDURE — D9220A PRA ANESTHESIA: ICD-10-PCS | Mod: CRNA,,, | Performed by: NURSE ANESTHETIST, CERTIFIED REGISTERED

## 2019-09-12 PROCEDURE — 71000033 HC RECOVERY, INTIAL HOUR: Performed by: ORTHOPAEDIC SURGERY

## 2019-09-12 PROCEDURE — 71000039 HC RECOVERY, EACH ADD'L HOUR: Performed by: ORTHOPAEDIC SURGERY

## 2019-09-12 PROCEDURE — 81003 URINALYSIS AUTO W/O SCOPE: CPT

## 2019-09-12 PROCEDURE — 37000008 HC ANESTHESIA 1ST 15 MINUTES: Performed by: ORTHOPAEDIC SURGERY

## 2019-09-12 PROCEDURE — 99900104 DSU ONLY-NO CHARGE-EA ADD'L HR (STAT): Performed by: ORTHOPAEDIC SURGERY

## 2019-09-12 RX ORDER — SODIUM CHLORIDE 0.9 % (FLUSH) 0.9 %
10 SYRINGE (ML) INJECTION
Status: DISCONTINUED | OUTPATIENT
Start: 2019-09-12 | End: 2019-09-12

## 2019-09-12 RX ORDER — PROPOFOL 10 MG/ML
VIAL (ML) INTRAVENOUS
Status: DISCONTINUED | OUTPATIENT
Start: 2019-09-12 | End: 2019-09-12

## 2019-09-12 RX ORDER — ACETAMINOPHEN 10 MG/ML
1000 INJECTION, SOLUTION INTRAVENOUS EVERY 8 HOURS
Status: COMPLETED | OUTPATIENT
Start: 2019-09-12 | End: 2019-09-13

## 2019-09-12 RX ORDER — GABAPENTIN 100 MG/1
100 CAPSULE ORAL 3 TIMES DAILY
Status: ON HOLD | COMMUNITY
End: 2019-10-07 | Stop reason: HOSPADM

## 2019-09-12 RX ORDER — LIDOCAINE HYDROCHLORIDE 10 MG/ML
0.5 INJECTION, SOLUTION EPIDURAL; INFILTRATION; INTRACAUDAL; PERINEURAL ONCE
Status: DISPENSED | OUTPATIENT
Start: 2019-09-12

## 2019-09-12 RX ORDER — SODIUM CHLORIDE, SODIUM LACTATE, POTASSIUM CHLORIDE, CALCIUM CHLORIDE 600; 310; 30; 20 MG/100ML; MG/100ML; MG/100ML; MG/100ML
INJECTION, SOLUTION INTRAVENOUS CONTINUOUS
Status: DISCONTINUED | OUTPATIENT
Start: 2019-09-12 | End: 2019-09-14

## 2019-09-12 RX ORDER — LIDOCAINE HCL/PF 100 MG/5ML
SYRINGE (ML) INTRAVENOUS
Status: DISCONTINUED | OUTPATIENT
Start: 2019-09-12 | End: 2019-09-12

## 2019-09-12 RX ORDER — PANTOPRAZOLE SODIUM 40 MG/1
40 TABLET, DELAYED RELEASE ORAL DAILY
Status: DISCONTINUED | OUTPATIENT
Start: 2019-09-13 | End: 2019-09-18 | Stop reason: HOSPADM

## 2019-09-12 RX ORDER — ALBUTEROL SULFATE 2.5 MG/.5ML
2.5 SOLUTION RESPIRATORY (INHALATION) DAILY PRN
Status: DISCONTINUED | OUTPATIENT
Start: 2019-09-12 | End: 2019-09-18 | Stop reason: HOSPADM

## 2019-09-12 RX ORDER — TRAZODONE HYDROCHLORIDE 50 MG/1
50 TABLET ORAL NIGHTLY
Status: DISCONTINUED | OUTPATIENT
Start: 2019-09-12 | End: 2019-09-18 | Stop reason: HOSPADM

## 2019-09-12 RX ORDER — ACETAMINOPHEN 10 MG/ML
INJECTION, SOLUTION INTRAVENOUS
Status: DISPENSED
Start: 2019-09-12 | End: 2019-09-13

## 2019-09-12 RX ORDER — MORPHINE SULFATE 2 MG/ML
2 INJECTION, SOLUTION INTRAMUSCULAR; INTRAVENOUS
Status: DISCONTINUED | OUTPATIENT
Start: 2019-09-12 | End: 2019-09-18 | Stop reason: HOSPADM

## 2019-09-12 RX ORDER — KETOROLAC TROMETHAMINE 30 MG/ML
15 INJECTION, SOLUTION INTRAMUSCULAR; INTRAVENOUS EVERY 6 HOURS PRN
Status: DISPENSED | OUTPATIENT
Start: 2019-09-12 | End: 2019-09-15

## 2019-09-12 RX ORDER — ONDANSETRON HYDROCHLORIDE 2 MG/ML
INJECTION, SOLUTION INTRAMUSCULAR; INTRAVENOUS
Status: DISCONTINUED | OUTPATIENT
Start: 2019-09-12 | End: 2019-09-12

## 2019-09-12 RX ORDER — FENTANYL CITRATE 50 UG/ML
25 INJECTION, SOLUTION INTRAMUSCULAR; INTRAVENOUS EVERY 5 MIN PRN
Status: COMPLETED | OUTPATIENT
Start: 2019-09-12 | End: 2019-09-12

## 2019-09-12 RX ORDER — FLUOXETINE HYDROCHLORIDE 20 MG/1
40 CAPSULE ORAL DAILY
Status: DISCONTINUED | OUTPATIENT
Start: 2019-09-13 | End: 2019-09-18 | Stop reason: HOSPADM

## 2019-09-12 RX ORDER — ALBUTEROL SULFATE 90 UG/1
1 AEROSOL, METERED RESPIRATORY (INHALATION) DAILY PRN
Status: DISCONTINUED | OUTPATIENT
Start: 2019-09-12 | End: 2019-09-12 | Stop reason: CLARIF

## 2019-09-12 RX ORDER — VASOPRESSIN 20 [USP'U]/ML
INJECTION, SOLUTION INTRAMUSCULAR; SUBCUTANEOUS
Status: DISCONTINUED | OUTPATIENT
Start: 2019-09-12 | End: 2019-09-12

## 2019-09-12 RX ORDER — GABAPENTIN 100 MG/1
100 CAPSULE ORAL 3 TIMES DAILY
Status: DISCONTINUED | OUTPATIENT
Start: 2019-09-12 | End: 2019-09-18 | Stop reason: HOSPADM

## 2019-09-12 RX ORDER — MUPIROCIN 20 MG/G
OINTMENT TOPICAL
Status: DISCONTINUED | OUTPATIENT
Start: 2019-09-12 | End: 2019-09-12 | Stop reason: HOSPADM

## 2019-09-12 RX ORDER — FENTANYL CITRATE 50 UG/ML
INJECTION, SOLUTION INTRAMUSCULAR; INTRAVENOUS
Status: DISCONTINUED | OUTPATIENT
Start: 2019-09-12 | End: 2019-09-12

## 2019-09-12 RX ORDER — ACETAMINOPHEN 10 MG/ML
1000 INJECTION, SOLUTION INTRAVENOUS EVERY 8 HOURS
Status: DISCONTINUED | OUTPATIENT
Start: 2019-09-12 | End: 2019-09-12

## 2019-09-12 RX ORDER — VANCOMYCIN HCL IN 5 % DEXTROSE 1G/250ML
PLASTIC BAG, INJECTION (ML) INTRAVENOUS
Status: DISPENSED
Start: 2019-09-12 | End: 2019-09-13

## 2019-09-12 RX ORDER — SODIUM CHLORIDE 9 MG/ML
INJECTION, SOLUTION INTRAVENOUS CONTINUOUS
Status: DISCONTINUED | OUTPATIENT
Start: 2019-09-12 | End: 2019-09-16

## 2019-09-12 RX ORDER — VANCOMYCIN HCL IN 5 % DEXTROSE 1G/250ML
1000 PLASTIC BAG, INJECTION (ML) INTRAVENOUS
Status: DISCONTINUED | OUTPATIENT
Start: 2019-09-12 | End: 2019-09-14

## 2019-09-12 RX ORDER — OXYCODONE AND ACETAMINOPHEN 10; 325 MG/1; MG/1
1 TABLET ORAL EVERY 6 HOURS PRN
Status: DISCONTINUED | OUTPATIENT
Start: 2019-09-12 | End: 2019-09-18 | Stop reason: HOSPADM

## 2019-09-12 RX ORDER — HYDROCODONE BITARTRATE AND ACETAMINOPHEN 10; 325 MG/1; MG/1
1 TABLET ORAL EVERY 4 HOURS PRN
Status: DISCONTINUED | OUTPATIENT
Start: 2019-09-12 | End: 2019-09-18 | Stop reason: HOSPADM

## 2019-09-12 RX ORDER — BUPROPION HYDROCHLORIDE 75 MG/1
75 TABLET ORAL 2 TIMES DAILY
Status: DISCONTINUED | OUTPATIENT
Start: 2019-09-12 | End: 2019-09-18 | Stop reason: HOSPADM

## 2019-09-12 RX ORDER — SODIUM CHLORIDE, SODIUM LACTATE, POTASSIUM CHLORIDE, CALCIUM CHLORIDE 600; 310; 30; 20 MG/100ML; MG/100ML; MG/100ML; MG/100ML
INJECTION, SOLUTION INTRAVENOUS CONTINUOUS
Status: DISCONTINUED | OUTPATIENT
Start: 2019-09-12 | End: 2019-09-12

## 2019-09-12 RX ORDER — OXYCODONE HYDROCHLORIDE 5 MG/1
5 TABLET ORAL
Status: DISCONTINUED | OUTPATIENT
Start: 2019-09-12 | End: 2019-09-12

## 2019-09-12 RX ORDER — LOVASTATIN 20 MG/1
20 TABLET ORAL NIGHTLY
Status: DISCONTINUED | OUTPATIENT
Start: 2019-09-12 | End: 2019-09-18 | Stop reason: HOSPADM

## 2019-09-12 RX ORDER — FLUCONAZOLE 100 MG/1
200 TABLET ORAL DAILY
Status: DISCONTINUED | OUTPATIENT
Start: 2019-09-12 | End: 2019-09-18 | Stop reason: HOSPADM

## 2019-09-12 RX ADMIN — BUPROPION HYDROCHLORIDE 75 MG: 75 TABLET, FILM COATED ORAL at 09:09

## 2019-09-12 RX ADMIN — FLUCONAZOLE 200 MG: 100 TABLET ORAL at 08:09

## 2019-09-12 RX ADMIN — OXYCODONE HYDROCHLORIDE 5 MG: 5 TABLET ORAL at 03:09

## 2019-09-12 RX ADMIN — TRAZODONE HYDROCHLORIDE 50 MG: 50 TABLET ORAL at 08:09

## 2019-09-12 RX ADMIN — VANCOMYCIN HYDROCHLORIDE 1000 MG: 1 INJECTION, POWDER, LYOPHILIZED, FOR SOLUTION INTRAVENOUS at 04:09

## 2019-09-12 RX ADMIN — ACETAMINOPHEN 1000 MG: 10 INJECTION, SOLUTION INTRAVENOUS at 04:09

## 2019-09-12 RX ADMIN — CEFTRIAXONE 2 G: 2 INJECTION, SOLUTION INTRAVENOUS at 08:09

## 2019-09-12 RX ADMIN — GABAPENTIN 100 MG: 100 CAPSULE ORAL at 08:09

## 2019-09-12 RX ADMIN — MORPHINE SULFATE 2 MG: 2 INJECTION, SOLUTION INTRAMUSCULAR; INTRAVENOUS at 07:09

## 2019-09-12 RX ADMIN — VASOPRESSIN 1 UNITS: 20 INJECTION, SOLUTION INTRAMUSCULAR; SUBCUTANEOUS at 02:09

## 2019-09-12 RX ADMIN — ONDANSETRON 4 MG: 2 INJECTION, SOLUTION INTRAMUSCULAR; INTRAVENOUS at 02:09

## 2019-09-12 RX ADMIN — FENTANYL CITRATE 25 MCG: 0.05 INJECTION, SOLUTION INTRAMUSCULAR; INTRAVENOUS at 03:09

## 2019-09-12 RX ADMIN — FENTANYL CITRATE 50 MCG: 50 INJECTION, SOLUTION INTRAMUSCULAR; INTRAVENOUS at 03:09

## 2019-09-12 RX ADMIN — FENTANYL CITRATE 25 MCG: 0.05 INJECTION, SOLUTION INTRAMUSCULAR; INTRAVENOUS at 04:09

## 2019-09-12 RX ADMIN — LIDOCAINE HYDROCHLORIDE 100 MG: 20 INJECTION, SOLUTION INTRAVENOUS at 02:09

## 2019-09-12 RX ADMIN — LOVASTATIN 20 MG: 20 TABLET ORAL at 08:09

## 2019-09-12 RX ADMIN — DEXTROSE 3 G: 50 INJECTION, SOLUTION INTRAVENOUS at 02:09

## 2019-09-12 RX ADMIN — KETOROLAC TROMETHAMINE 15 MG: 30 INJECTION, SOLUTION INTRAMUSCULAR at 04:09

## 2019-09-12 RX ADMIN — SODIUM CHLORIDE, SODIUM LACTATE, POTASSIUM CHLORIDE, AND CALCIUM CHLORIDE: .6; .31; .03; .02 INJECTION, SOLUTION INTRAVENOUS at 02:09

## 2019-09-12 RX ADMIN — FENTANYL CITRATE 50 MCG: 50 INJECTION, SOLUTION INTRAMUSCULAR; INTRAVENOUS at 02:09

## 2019-09-12 RX ADMIN — PROPOFOL 100 MG: 10 INJECTION, EMULSION INTRAVENOUS at 02:09

## 2019-09-12 NOTE — SUBJECTIVE & OBJECTIVE
Past Medical History:   Diagnosis Date    Arthritis     Asthma     Encounter for blood transfusion     Hypertension        Past Surgical History:   Procedure Laterality Date    ARTHROPLASTY, KNEE Right 2019    Performed by [facility] at [facility] OR     SECTION      INCISION AND DRAINAGE, LOWER EXTREMITY Right 2019    Performed by Delio [facility]ung MD at [facility] OR    TONSILLECTOMY         Review of patient's allergies indicates:  No Known Allergies    No current facility-administered medications on file prior to encounter.      Current Outpatient Medications on File Prior to Encounter   Medication Sig    buPROPion (WELLBUTRIN) 75 MG tablet Take 1 tablet (75 mg total) by mouth 2 (two) times daily.    cefTRIAXone 2 g in dextrose 5 % 50 mL (ROCEPHIN) 2 g/50 mL PgBk IVPB Inject 50 mLs (2 g total) into the vein once daily. for 7 days    cyanocobalamin, vitamin B-12, (VITAMIN B-12) 50 mcg tablet Take 50 mcg by mouth twice a week. Mon Th    ferrous sulfate (FEOSOL) 325 mg (65 mg iron) Tab tablet Take 325 mg by mouth twice a week.     FLUoxetine 40 MG capsule Take 40 mg by mouth once daily.     gabapentin (NEURONTIN) 100 MG capsule Take 100 mg by mouth 3 (three) times daily.    lactobacillus acidophilus & bulgar (LACTINEX) 100 million cell packet Take 1 packet (1 each total) by mouth 2 (two) times daily. for 15 days    lisinopril-hydrochlorothiazide (PRINZIDE,ZESTORETIC) 20-25 mg Tab Take 1 tablet by mouth once daily.    lovastatin (MEVACOR) 20 MG tablet Take 20 mg by mouth every evening.    oxyCODONE-acetaminophen (PERCOCET)  mg per tablet Take 1 tablet by mouth every 6 (six) hours as needed for Pain.    pantoprazole (PROTONIX) 40 MG tablet Take 1 tablet (40 mg total) by mouth once daily.    traZODone (DESYREL) 50 MG tablet Take 50 mg by mouth every evening.    ANORO ELLIPTA 62.5-25 mcg/actuation DsDv Inhale 1 puff into the lungs daily as needed (SOB).     [START  ON 10/5/2019] aspirin (ECOTRIN) 81 MG EC tablet Take 1 tablet (81 mg total) by mouth 2 (two) times daily. 2 tablets daily    aspirin 325 MG tablet Take 1 tablet (325 mg total) by mouth 2 (two) times daily.    fluticasone (FLONASE) 50 mcg/actuation nasal spray 1 spray by Each Nostril route every evening.     VENTOLIN HFA 90 mcg/actuation inhaler Inhale 1 puff into the lungs daily as needed for Wheezing or Shortness of Breath.      Family History     Problem Relation (Age of Onset)    Alzheimer's disease Mother        Tobacco Use    Smoking status: Light Tobacco Smoker     Packs/day: 0.50     Years: 30.00     Pack years: 15.00     Types: Cigarettes    Smokeless tobacco: Never Used   Substance and Sexual Activity    Alcohol use: Yes     Comment: RARELY    Drug use: Never    Sexual activity: Not on file     Review of Systems   Respiratory: Negative for cough, chest tightness, shortness of breath and wheezing.    Cardiovascular: Negative for chest pain, palpitations and leg swelling.   Gastrointestinal: Negative for abdominal distention, abdominal pain, constipation, diarrhea, nausea and vomiting.   Genitourinary: Negative for difficulty urinating, dysuria and flank pain.   Musculoskeletal: Negative for gait problem, joint swelling and neck pain.        Knee pain   Skin: Negative for rash and wound.   Neurological: Negative for dizziness, syncope, light-headedness and headaches.   Psychiatric/Behavioral: Negative for agitation, behavioral problems and confusion.     Objective:     Vital Signs (Most Recent):  Temp: 97.8 °F (36.6 °C) (09/12/19 1715)  Pulse: 65 (09/12/19 1715)  Resp: 18 (09/12/19 1715)  BP: 105/65 (09/12/19 1715)  SpO2: 98 % (09/12/19 1715) Vital Signs (24h Range):  Temp:  [97.8 °F (36.6 °C)-98 °F (36.7 °C)] 97.8 °F (36.6 °C)  Pulse:  [57-70] 65  Resp:  [10-18] 18  SpO2:  [95 %-100 %] 98 %  BP: ()/(56-86) 105/65     Weight: 127 kg (280 lb)  Body mass index is 51.21 kg/m².    Physical Exam    Constitutional: She is oriented to person, place, and time. She appears well-developed and well-nourished.   HENT:   Head: Normocephalic and atraumatic.   Eyes: Pupils are equal, round, and reactive to light. EOM are normal.   Cardiovascular: Normal rate, regular rhythm, normal heart sounds and intact distal pulses.   Pulmonary/Chest: Effort normal and breath sounds normal. No respiratory distress. She has no wheezes.   Abdominal: Soft. Bowel sounds are normal. She exhibits no distension. There is no tenderness.   Musculoskeletal: Normal range of motion. She exhibits no edema or tenderness.   Wound VAC over the right knee joint   Neurological: She is alert and oriented to person, place, and time. No cranial nerve deficit. Coordination normal.   Skin: Skin is warm and dry. Capillary refill takes less than 2 seconds. No rash noted.   Psychiatric: She has a normal mood and affect. Her behavior is normal. Judgment and thought content normal.   Nursing note and vitals reviewed.        CRANIAL NERVES     CN III, IV, VI   Pupils are equal, round, and reactive to light.  Extraocular motions are normal.        Significant Labs:   BMP:   Recent Labs   Lab 09/12/19  1054   GLU 99      K 4.1      CO2 26   BUN 18   CREATININE 1.0   CALCIUM 9.3     CBC:   Recent Labs   Lab 09/12/19  1054   WBC 7.97   HGB 9.4*   HCT 30.8*   *       Significant Imaging: I have reviewed all pertinent imaging results/findings within the past 24 hours.

## 2019-09-12 NOTE — CONSULTS
Pharmacokinetic Initial Assessment: IV Vancomycin    Assessment/Plan:    Initiate intravenous vancomycin with dose of vancomycin 1000mg IV every 12 hours. Pt was on 1250mg Q12H previously and level was supra therapeutic.  Desired empiric serum trough concentration is 15 to 20 mcg/mL  Draw vancomycin trough level 30 min prior to fourth dose on 9/14 at approximately 0430   Pharmacy will continue to follow and monitor vancomycin.      Please contact pharmacy at extension [phone] with any questions regarding this assessment.     Thank you for the consult,   Ricardo Jossy       Patient brief summary:  Iris Mueller is a 63 y.o. female initiated on antimicrobial therapy with IV Vancomycin for treatment of suspected bone/joint infection    Drug Allergies:   Review of patient's allergies indicates:  No Known Allergies    Actual Body Weight:   71.6kg    Renal Function:   Estimated Creatinine Clearance: 71.6 mL/min (based on SCr of 1 mg/dL).,       CBC (last 72 hours):  Recent Labs   Lab Result Units 09/12/19  1054   WBC K/uL 7.97   Hemoglobin g/dL 9.4*   Hematocrit % 30.8*   Platelets K/uL 538*   Gran% % 75.5*   Lymph% % 13.3*   Mono% % 6.1   Eosinophil% % 3.8   Basophil% % 0.4   Differential Method  Automated       Metabolic Panel (last 72 hours):  Recent Labs   Lab Result Units 09/11/19  1050 09/12/19  1054   Sodium mmol/L  --  136   Potassium mmol/L  --  4.1   Chloride mmol/L  --  101   CO2 mmol/L  --  26   Glucose mg/dL  --  99   Glucose, UA  Negative  --    BUN, Bld mg/dL  --  18   Creatinine mg/dL  --  1.0       Drug levels (last 3 results):  No results for input(s): VANCOMYCINRA, VANCOMYCINPE, VANCOMYCINTR in the last 72 hours.    Microbiologic Results:  Microbiology Results (last 7 days)       Procedure Component Value Units Date/Time    Aerobic culture [[id number]] Collected:  09/12/19 1521    Order Status:  Sent Specimen:  Body Fluid from Knee, Right Updated:  09/12/19 1522    Culture, Anaerobic [[id number]]  Collected:  09/12/19 1521    Order Status:  Sent Specimen:  Body Fluid from Knee, Right Updated:  09/12/19 1529

## 2019-09-12 NOTE — H&P
[facility] Hospital Medicine  History & Physical    Patient Name: Iris Mueller  MRN: [id number]  Admission Date: 2019  Attending Physician: Paige Wylie MD   Primary Care Provider: Elkin You MD         Patient information was obtained from patient, relative(s) and ER records.     Subjective:     Principal Problem:Infection of right knee    Chief Complaint:  Right knee infection     HPI: The patient is a 63-year-old woman who underwent a right total knee arthroplasty  of this year.  She was admitted  with infection in the right knee and had an I and D done.  She was placed on Rocephin and discharged to a skilled nursing facility.  Dr. Chung took her back to the OR today for another I and D and placement of a wound VAC.  The wound was not healing properly and the sutures had become undone.      The patient was otherwise doing well at the skilled nursing facility.  She has not had any fever or chills.  She has been walking with assistance.  She has not had any falls.    Past Medical History:   Diagnosis Date    Arthritis     Asthma     Encounter for blood transfusion     Hypertension        Past Surgical History:   Procedure Laterality Date    ARTHROPLASTY, KNEE Right 2019    Performed by [facility] at [facility] OR     SECTION      INCISION AND DRAINAGE, LOWER EXTREMITY Right 2019    Performed by Delio Chung MD at [facility] OR    TONSILLECTOMY         Review of patient's allergies indicates:  No Known Allergies    No current facility-administered medications on file prior to encounter.      Current Outpatient Medications on File Prior to Encounter   Medication Sig    buPROPion (WELLBUTRIN) 75 MG tablet Take 1 tablet (75 mg total) by mouth 2 (two) times daily.    cefTRIAXone 2 g in dextrose 5 % 50 mL (ROCEPHIN) 2 g/50 mL PgBk IVPB Inject 50 mLs (2 g total) into the vein once daily. for 7 days    cyanocobalamin,  vitamin B-12, (VITAMIN B-12) 50 mcg tablet Take 50 mcg by mouth twice a week. Mon Thurs    ferrous sulfate (FEOSOL) 325 mg (65 mg iron) Tab tablet Take 325 mg by mouth twice a week. Mon Thurs    FLUoxetine 40 MG capsule Take 40 mg by mouth once daily.     gabapentin (NEURONTIN) 100 MG capsule Take 100 mg by mouth 3 (three) times daily.    lactobacillus acidophilus & bulgar (LACTINEX) 100 million cell packet Take 1 packet (1 each total) by mouth 2 (two) times daily. for 15 days    lisinopril-hydrochlorothiazide (PRINZIDE,ZESTORETIC) 20-25 mg Tab Take 1 tablet by mouth once daily.    lovastatin (MEVACOR) 20 MG tablet Take 20 mg by mouth every evening.    oxyCODONE-acetaminophen (PERCOCET)  mg per tablet Take 1 tablet by mouth every 6 (six) hours as needed for Pain.    pantoprazole (PROTONIX) 40 MG tablet Take 1 tablet (40 mg total) by mouth once daily.    traZODone (DESYREL) 50 MG tablet Take 50 mg by mouth every evening.    ANORO ELLIPTA 62.5-25 mcg/actuation DsDv Inhale 1 puff into the lungs daily as needed (SOB).     [START ON 10/5/2019] aspirin (ECOTRIN) 81 MG EC tablet Take 1 tablet (81 mg total) by mouth 2 (two) times daily. 2 tablets daily    aspirin 325 MG tablet Take 1 tablet (325 mg total) by mouth 2 (two) times daily.    fluticasone (FLONASE) 50 mcg/actuation nasal spray 1 spray by Each Nostril route every evening.     VENTOLIN HFA 90 mcg/actuation inhaler Inhale 1 puff into the lungs daily as needed for Wheezing or Shortness of Breath.      Family History     Problem Relation (Age of Onset)    Alzheimer's disease Mother        Tobacco Use    Smoking status: Light Tobacco Smoker     Packs/day: 0.50     Years: 30.00     Pack years: 15.00     Types: Cigarettes    Smokeless tobacco: Never Used   Substance and Sexual Activity    Alcohol use: Yes     Comment: RARELY    Drug use: Never    Sexual activity: Not on file     Review of Systems   Respiratory: Negative for cough, chest  tightness, shortness of breath and wheezing.    Cardiovascular: Negative for chest pain, palpitations and leg swelling.   Gastrointestinal: Negative for abdominal distention, abdominal pain, constipation, diarrhea, nausea and vomiting.   Genitourinary: Negative for difficulty urinating, dysuria and flank pain.   Musculoskeletal: Negative for gait problem, joint swelling and neck pain.        Knee pain   Skin: Negative for rash and wound.   Neurological: Negative for dizziness, syncope, light-headedness and headaches.   Psychiatric/Behavioral: Negative for agitation, behavioral problems and confusion.     Objective:     Vital Signs (Most Recent):  Temp: 97.8 °F (36.6 °C) (09/12/19 1715)  Pulse: 65 (09/12/19 1715)  Resp: 18 (09/12/19 1715)  BP: 105/65 (09/12/19 1715)  SpO2: 98 % (09/12/19 1715) Vital Signs (24h Range):  Temp:  [97.8 °F (36.6 °C)-98 °F (36.7 °C)] 97.8 °F (36.6 °C)  Pulse:  [57-70] 65  Resp:  [10-18] 18  SpO2:  [95 %-100 %] 98 %  BP: ()/(56-86) 105/65     Weight: 127 kg (280 lb)  Body mass index is 51.21 kg/m².    Physical Exam   Constitutional: She is oriented to person, place, and time. She appears well-developed and well-nourished.   HENT:   Head: Normocephalic and atraumatic.   Eyes: Pupils are equal, round, and reactive to light. EOM are normal.   Cardiovascular: Normal rate, regular rhythm, normal heart sounds and intact distal pulses.   Pulmonary/Chest: Effort normal and breath sounds normal. No respiratory distress. She has no wheezes.   Abdominal: Soft. Bowel sounds are normal. She exhibits no distension. There is no tenderness.   Musculoskeletal: Normal range of motion. She exhibits no edema or tenderness.   Wound VAC over the right knee joint   Neurological: She is alert and oriented to person, place, and time. No cranial nerve deficit. Coordination normal.   Skin: Skin is warm and dry. Capillary refill takes less than 2 seconds. No rash noted.   Psychiatric: She has a normal mood and  affect. Her behavior is normal. Judgment and thought content normal.   Nursing note and vitals reviewed.        CRANIAL NERVES     CN III, IV, VI   Pupils are equal, round, and reactive to light.  Extraocular motions are normal.        Significant Labs:   BMP:   Recent Labs   Lab 09/12/19  1054   GLU 99      K 4.1      CO2 26   BUN 18   CREATININE 1.0   CALCIUM 9.3     CBC:   Recent Labs   Lab 09/12/19  1054   WBC 7.97   HGB 9.4*   HCT 30.8*   *       Significant Imaging: I have reviewed all pertinent imaging results/findings within the past 24 hours.    Assessment/Plan:     * Infection of right knee  The patient had another I and D of the right knee today and a wound VAC was placed.  Dr. To saw the patient today and will continue IV antibiotics.      Essential hypertension  Her blood pressure is on the low side right now therefore I will hold her antihypertensives      Mild asthma without complication  Stable.  No evidence of exacerbation.       Thrombocytosis  This is probably reactive thrombocytosis      Normocytic anemia  The patient has been anemic for several months and her hemoglobin is at baseline      VTE Risk Mitigation (From admission, onward)        Ordered     IP VTE HIGH RISK PATIENT  Once      09/12/19 1719     Place sequential compression device  Until discontinued      09/12/19 1719     Place KAROLINA hose  Until discontinued      09/12/19 1719     Place sequential compression device  Until discontinued      09/12/19 1017     Place KAROLINA hose  Until discontinued      09/12/19 1017             Paige Wylie MD  Department of Hospital Medicine   [facility]

## 2019-09-12 NOTE — HPI
The patient is a 63-year-old woman who underwent a right total knee arthroplasty August 14th of this year.  She was admitted August 30th with infection in the right knee and had an I and D done.  She was placed on Rocephin and discharged to a skilled nursing facility.  Dr. Chung took her back to the OR today for another I and D and placement of a wound VAC.  The wound was not healing properly and the sutures had become undone.      The patient was otherwise doing well at the skilled nursing facility.  She has not had any fever or chills.  She has been walking with assistance.  She has not had any falls.

## 2019-09-12 NOTE — ASSESSMENT & PLAN NOTE
The patient had another I and D of the right knee today and a wound VAC was placed.  Dr. To saw the patient today and will continue IV antibiotics.

## 2019-09-12 NOTE — INTERVAL H&P NOTE
The patient has been examined and the H&P has been reviewed:    I concur with the findings and no changes have occurred since H&P was written.    Anesthesia/Surgery risks, benefits and alternative options discussed and understood by patient/family.          Active Hospital Problems    Diagnosis  POA    Infection of right knee [M00.9]  Yes      Resolved Hospital Problems   No resolved problems to display.

## 2019-09-12 NOTE — PLAN OF CARE
Pain still 8 but frequently drowsy. No nausea, tolerating po, reevaluated per anesthesia MD and released to his room

## 2019-09-12 NOTE — ANESTHESIA PREPROCEDURE EVALUATION
09/12/2019  Iris Mueller is a 63 y.o., female.    Pre-op Assessment    I have reviewed the Patient Summary Reports.     I have reviewed the Nursing Notes.   I have reviewed the Medications.     Review of Systems  Anesthesia Hx:  Denies Family Hx of Anesthesia complications.   Denies Personal Hx of Anesthesia complications.   Social:  Smoker    Cardiovascular:   Hypertension ECG has been reviewed. Systolic heart murmur heard over the Aortic area   Pulmonary:   COPD, moderate Asthma moderate    Musculoskeletal:   Arthritis   Spine Disorders: lumbar Degenerative disease and Chronic Pain        Physical Exam  General:  Morbid Obesity, Malnutrition    Airway/Jaw/Neck:  Airway Findings: Mouth Opening: Small, but > 3cm Tongue: Normal  General Airway Assessment: Adult  Mallampati: III  Improves to II with phonation.  TM Distance: 4 - 6 cm  Jaw/Neck Findings:  Neck ROM: Extension Decreased, Mild      Dental:  Dental Findings: Upper Dentures, Lower Dentures   Chest/Lungs:  Chest/Lungs Findings: Decreased Breath Sounds Bilateral, Clear to auscultation     Heart/Vascular:  Heart Findings: Rate: Normal  Rhythm: Regular Rhythm  Sounds: Quiet  Heart Murmur  Systolic  Systolic Heart Murmur Description: R Upper Sternal Border, Radiates to Carotids  Systolic Heart Murmur Grade: Grade III             Anesthesia Plan  Type of Anesthesia, risks & benefits discussed:  Anesthesia Type:  general  Patient's Preference:   Intra-op Monitoring Plan: standard ASA monitors  Intra-op Monitoring Plan Comments:   Post Op Pain Control Plan:   Post Op Pain Control Plan Comments:   Induction:    Beta Blocker:  Patient is not currently on a Beta-Blocker (No further documentation required).       Informed Consent: Patient understands risks and agrees with Anesthesia plan.  Questions answered. Anesthesia consent signed with  patient.  ASA Score: 3     Day of Surgery Review of History & Physical:    H&P update referred to the surgeon.         Ready For Surgery From Anesthesia Perspective.

## 2019-09-12 NOTE — BRIEF OP NOTE
[facility]  Brief Operative Note    SUMMARY     Surgery Date: 9/12/2019     Surgeon(s) and Role:     * Delio Chung MD - Primary    Assisting Surgeon: None    Pre-op Diagnosis:  Infection of right knee [M00.9]  Pre-op exam [Z01.818]    Post-op Diagnosis:  Post-Op Diagnosis Codes:     * Infection of right knee [M00.9]     * Pre-op exam [Z01.818]    Procedure(s) (LRB):  INCISION AND DRAINAGE, LOWER EXTREMITY (Right)  REPLACEMENT, WOUND VAC (Right)    Anesthesia: General    Description of Procedure: right knee wound incision and debridement with placement of wound vac dressing    Description of the findings of the procedure: See Dictation     Estimated Blood Loss: * No values recorded between 9/12/2019  3:17 PM and 9/12/2019  3:48 PM *         Specimens:   Specimen (12h ago, onward)    None      [id number]

## 2019-09-12 NOTE — ANESTHESIA POSTPROCEDURE EVALUATION
Anesthesia Post Evaluation    Patient: Iris Mueller    Procedure(s) Performed: Procedure(s) (LRB):  INCISION AND DRAINAGE, LOWER EXTREMITY (Right)  REPLACEMENT, WOUND VAC (Right)    Final Anesthesia Type: general  Patient location during evaluation: PACU  Patient participation: Yes- Able to Participate  Level of consciousness: awake and alert, oriented and awake  Post-procedure vital signs: reviewed and stable  Pain management: adequate  Airway patency: patent  PONV status at discharge: No PONV  Anesthetic complications: no      Cardiovascular status: blood pressure returned to baseline and hemodynamically stable  Respiratory status: unassisted, spontaneous ventilation and room air  Hydration status: euvolemic  Follow-up not needed.          Vitals Value Taken Time   /69 9/12/2019  4:43 PM   Temp 36.7 °C (98 °F) 9/12/2019  3:45 PM   Pulse 60 9/12/2019  4:45 PM   Resp 28 9/12/2019  4:45 PM   SpO2 98 % 9/12/2019  4:45 PM   Vitals shown include unvalidated device data.      No case tracking events are documented in the log.      Pain/Ad Score: Pain Rating Prior to Med Admin: 8 (9/12/2019  4:39 PM)  Ad Score: 8 (9/12/2019  4:00 PM)

## 2019-09-12 NOTE — TRANSFER OF CARE
"Anesthesia Transfer of Care Note    Patient: Iris Mueller    Procedure(s) Performed: Procedure(s) (LRB):  INCISION AND DRAINAGE, LOWER EXTREMITY (Right)  REPLACEMENT, WOUND VAC (Right)    Patient location: PACU    Anesthesia Type: general    Transport from OR: Transported from OR on 2-3 L/min O2 by NC with adequate spontaneous ventilation    Post pain: adequate analgesia    Post assessment: no apparent anesthetic complications and tolerated procedure well    Post vital signs: stable    Level of consciousness: sedated    Nausea/Vomiting: no nausea/vomiting    Complications: none    Transfer of care protocol was followed      Last vitals:   Visit Vitals  BP 94/66   Pulse (!) 57   Temp 36.6 °C (97.9 °F)   Resp 18   Ht 5' 2" (1.575 m)   Wt 127 kg (280 lb)   SpO2 95%   Breastfeeding? No   BMI 51.21 kg/m²     "

## 2019-09-13 PROBLEM — N18.30 CHRONIC KIDNEY DISEASE, STAGE III (MODERATE): Status: ACTIVE | Noted: 2019-09-13

## 2019-09-13 PROBLEM — E66.813 OBESITY, CLASS III, BMI 40-49.9 (MORBID OBESITY): Status: ACTIVE | Noted: 2019-08-14

## 2019-09-13 PROBLEM — E87.1 HYPONATREMIA: Status: ACTIVE | Noted: 2019-09-13

## 2019-09-13 PROBLEM — R73.9 HYPERGLYCEMIA: Status: ACTIVE | Noted: 2019-09-13

## 2019-09-13 PROBLEM — T81.31XD SURGICAL WOUND DEHISCENCE, SUBSEQUENT ENCOUNTER: Status: ACTIVE | Noted: 2019-09-13

## 2019-09-13 LAB — POCT GLUCOSE: 145 MG/DL (ref 70–110)

## 2019-09-13 PROCEDURE — 25000003 PHARM REV CODE 250: Performed by: INTERNAL MEDICINE

## 2019-09-13 PROCEDURE — 25000003 PHARM REV CODE 250: Performed by: ORTHOPAEDIC SURGERY

## 2019-09-13 PROCEDURE — 63600175 PHARM REV CODE 636 W HCPCS: Performed by: ORTHOPAEDIC SURGERY

## 2019-09-13 PROCEDURE — 97162 PT EVAL MOD COMPLEX 30 MIN: CPT | Performed by: PHYSICAL THERAPIST

## 2019-09-13 PROCEDURE — 97116 GAIT TRAINING THERAPY: CPT | Performed by: PHYSICAL THERAPIST

## 2019-09-13 PROCEDURE — 99024 PR POST-OP FOLLOW-UP VISIT: ICD-10-PCS | Mod: ,,, | Performed by: ORTHOPAEDIC SURGERY

## 2019-09-13 PROCEDURE — G8978 MOBILITY CURRENT STATUS: HCPCS | Mod: CK | Performed by: PHYSICAL THERAPIST

## 2019-09-13 PROCEDURE — 63600175 PHARM REV CODE 636 W HCPCS: Performed by: NURSE PRACTITIONER

## 2019-09-13 PROCEDURE — 63600175 PHARM REV CODE 636 W HCPCS: Performed by: INTERNAL MEDICINE

## 2019-09-13 PROCEDURE — 94761 N-INVAS EAR/PLS OXIMETRY MLT: CPT

## 2019-09-13 PROCEDURE — G8979 MOBILITY GOAL STATUS: HCPCS | Mod: CI | Performed by: PHYSICAL THERAPIST

## 2019-09-13 PROCEDURE — 36415 COLL VENOUS BLD VENIPUNCTURE: CPT

## 2019-09-13 PROCEDURE — 99024 POSTOP FOLLOW-UP VISIT: CPT | Mod: ,,, | Performed by: ORTHOPAEDIC SURGERY

## 2019-09-13 PROCEDURE — 99900035 HC TECH TIME PER 15 MIN (STAT)

## 2019-09-13 PROCEDURE — 99231 PR SUBSEQUENT HOSPITAL CARE,LEVL I: ICD-10-PCS | Mod: S$GLB,,, | Performed by: INTERNAL MEDICINE

## 2019-09-13 PROCEDURE — 99231 SBSQ HOSP IP/OBS SF/LOW 25: CPT | Mod: S$GLB,,, | Performed by: INTERNAL MEDICINE

## 2019-09-13 PROCEDURE — 12000002 HC ACUTE/MED SURGE SEMI-PRIVATE ROOM

## 2019-09-13 RX ORDER — IBUPROFEN 200 MG
16 TABLET ORAL
Status: DISCONTINUED | OUTPATIENT
Start: 2019-09-13 | End: 2019-09-15

## 2019-09-13 RX ORDER — GLUCAGON 1 MG
1 KIT INJECTION
Status: DISCONTINUED | OUTPATIENT
Start: 2019-09-13 | End: 2019-09-15

## 2019-09-13 RX ORDER — INSULIN ASPART 100 [IU]/ML
0-5 INJECTION, SOLUTION INTRAVENOUS; SUBCUTANEOUS
Status: DISCONTINUED | OUTPATIENT
Start: 2019-09-13 | End: 2019-09-15

## 2019-09-13 RX ORDER — FUROSEMIDE 10 MG/ML
40 INJECTION INTRAMUSCULAR; INTRAVENOUS ONCE
Status: COMPLETED | OUTPATIENT
Start: 2019-09-13 | End: 2019-09-13

## 2019-09-13 RX ORDER — IBUPROFEN 200 MG
24 TABLET ORAL
Status: DISCONTINUED | OUTPATIENT
Start: 2019-09-13 | End: 2019-09-15

## 2019-09-13 RX ADMIN — ACETAMINOPHEN 1000 MG: 10 INJECTION, SOLUTION INTRAVENOUS at 09:09

## 2019-09-13 RX ADMIN — BUPROPION HYDROCHLORIDE 75 MG: 75 TABLET, FILM COATED ORAL at 09:09

## 2019-09-13 RX ADMIN — FLUCONAZOLE 200 MG: 100 TABLET ORAL at 09:09

## 2019-09-13 RX ADMIN — VANCOMYCIN HYDROCHLORIDE 1000 MG: 1 INJECTION, POWDER, LYOPHILIZED, FOR SOLUTION INTRAVENOUS at 05:09

## 2019-09-13 RX ADMIN — HYDROCODONE BITARTRATE AND ACETAMINOPHEN 1 TABLET: 10; 325 TABLET ORAL at 09:09

## 2019-09-13 RX ADMIN — ACETAMINOPHEN 1000 MG: 10 INJECTION, SOLUTION INTRAVENOUS at 12:09

## 2019-09-13 RX ADMIN — HYDROCODONE BITARTRATE AND ACETAMINOPHEN 1 TABLET: 10; 325 TABLET ORAL at 05:09

## 2019-09-13 RX ADMIN — GABAPENTIN 100 MG: 100 CAPSULE ORAL at 03:09

## 2019-09-13 RX ADMIN — MORPHINE SULFATE 2 MG: 2 INJECTION, SOLUTION INTRAMUSCULAR; INTRAVENOUS at 05:09

## 2019-09-13 RX ADMIN — MORPHINE SULFATE 2 MG: 2 INJECTION, SOLUTION INTRAMUSCULAR; INTRAVENOUS at 12:09

## 2019-09-13 RX ADMIN — LOVASTATIN 20 MG: 20 TABLET ORAL at 09:09

## 2019-09-13 RX ADMIN — CEFTRIAXONE 2 G: 2 INJECTION, SOLUTION INTRAVENOUS at 09:09

## 2019-09-13 RX ADMIN — FUROSEMIDE 40 MG: 10 INJECTION, SOLUTION INTRAMUSCULAR; INTRAVENOUS at 03:09

## 2019-09-13 RX ADMIN — PANTOPRAZOLE SODIUM 40 MG: 40 TABLET, DELAYED RELEASE ORAL at 09:09

## 2019-09-13 RX ADMIN — GABAPENTIN 100 MG: 100 CAPSULE ORAL at 09:09

## 2019-09-13 RX ADMIN — TRAZODONE HYDROCHLORIDE 50 MG: 50 TABLET ORAL at 09:09

## 2019-09-13 RX ADMIN — FLUOXETINE 40 MG: 20 CAPSULE ORAL at 09:09

## 2019-09-13 NOTE — PLAN OF CARE
Problem: Physical Therapy Goal  Goal: Physical Therapy Goal  Goals to be met by: 2019     Patient will increase functional independence with mobility by performin. Supine to sit with Modified [place]  2. Sit to supine with Modified [place]  3. Sit to stand transfer with Contact Guard Assistance  4. Bed to chair transfer with Contact Guard Assistance using Rolling Walker  5. Gait  x 250 feet with Contact Guard Assistance using Rolling Walker.   6. Lower extremity exercise program x10-20 reps per handout, with independence    Outcome: Ongoing (interventions implemented as appropriate)  Pt ambulated 175ft with RW, CGA and Chair follow

## 2019-09-13 NOTE — NURSING
Pt transferred to room 310 from recovery. Received report from [place].  AAOx4. Wound vac to right knee intact. Reddish drainage to container. SCD in place. Nurse informed Dr To of ID consult.

## 2019-09-13 NOTE — PLAN OF CARE
09/12/19 2006   Patient Assessment/Suction   Level of Consciousness (AVPU) alert   Respiratory Effort Unlabored   Expansion/Accessory Muscles/Retractions expansion symmetric   All Lung Fields Breath Sounds diminished   Rhythm/Pattern, Respiratory pattern regular   Cough Frequency infrequent   Cough Type nonproductive   PRE-TX-O2   O2 Device (Oxygen Therapy) nasal cannula   Flow (L/min) 2   SpO2 100 %   Pulse Oximetry Type Intermittent   $ Pulse Oximetry - Single Charge Pulse Oximetry - Single   Aerosol Therapy   $ Aerosol Therapy Charges PRN treatment not required

## 2019-09-13 NOTE — PHYSICIAN QUERY
"PT Name: Iris Mueller  MR #: [id number]     Physician Query Form - Documentation Clarification      CDS: Melissa Patten RN, CCDS         Contact information :ext [phone]  [email or web address]       This form is a permanent document in the medical record.     Query Date: September 13, 2019    By submitting this query, we are merely seeking further clarification of documentation. Please utilize your independent clinical judgment when addressing the question(s) below.    The Medical record reflects the following:    Supporting Clinical Findings Location in Medical Record       "morbid obesity"  "malnutrition"    "Weight: 127 kg (280 lb)  Body mass index is 51.21 kg/m²."  "This a well-developed, well nourished patient in no acute distress"    cyanocobalamin, vitamin B-12, (VITAMIN B-12) 50 mcg tablet  ferrous sulfate (FEOSOL) 325 mg (65 mg iron) Tab tablet    "Obese"  "Poor wound healing due to edema, smoking, etc"         Anesthesia pre procedure Evaluation 9/12/19      H&P 9/10/19          Current Outpt Meds per H&P 9/12/19          ID consult 9/12/19                                                                                Doctor, Please specify diagnosis or diagnoses associated with above clinical findings.  Please clarify diagnoses.    Provider Use Only        [   ] Morbid obesity without malnutrition.    [  x ] Morbid obesity with malnutrition, please specify degree of malnutrition, _____.    [   ] Other clarification, _____                                                                                                                 [  ] Clinically Undetermined               "

## 2019-09-13 NOTE — PROGRESS NOTES
Consult Note  Infectious Disease    Reason for Consult:  ID follow-up    HPI: Iris Mueller is a  63 y.o. female who underwent a right TKA on 08/14/2019 by Dr. Chung  she  presented to ED on 08/29/2019 with  bloody and foul-smelling drainage  from right knee incision which was partially dehisced after hematoma was expressed from the wound.  She required debridement, suffered a sepsis syndrome and required a short ICU stay with wound cultures growing Proteus, Klebsiella and E coli.  This wound infection was felt to be superficial and not involve the joint and she was ultimately discharged to skilled nursing facility on Rocephin with leg in straight leg position to avoid further dehiscence.  She was sent back to the hospital for revision of the wound after it deteriorated and was taken to surgery today where it was debrided and wound VAC was placed .  New cultures were submitted and she was given vancomycin.  Images of the wound were seen on her daughter's phone with broken sutures, slough within the widening incision and with no surrounding cellulitis of significance.  She did not have any fever, chills, sweats or constitutional symptoms.  Her only abscess event while on the antibiotics was mild perineal candidiasis.    9/13:  In good spirits. Walked with PT. Received some lasix and voiding well. No antibiotic intolerance. Cultures are too young. D/w Dr. chung         EXAM & DIAGNOSTICS REVIEWED:   Vitals:     Temp:  [96 °F (35.6 °C)-98.2 °F (36.8 °C)]   Temp: 98 °F (36.7 °C) (09/13/19 1152)  Pulse: 64 (09/13/19 1152)  Resp: 18 (09/13/19 1152)  BP: 101/65 (09/13/19 1152)  SpO2: 98 % (09/13/19 1152)    Intake/Output Summary (Last 24 hours) at 9/13/2019 1725  Last data filed at 9/13/2019 0530  Gross per 24 hour   Intake 820 ml   Output 1000 ml   Net -180 ml       General:  In NAD. Alert and attentive, cooperative, comfortable  Eyes:  Anicteric,   EOMI  ENT:  N ,   Neck:     Lungs:    Heart:     Abd:      :  Voids s  Musc:  Excluding right knee, no Joints without effusion, swelling, erythema, synovitis .   Skin:  No rashes.   Wound: Wound vac in place, no cellulitis, wound photos reviewed on daughter's phone    Neuro:   Alert, attentive, speech fluent, face symmetric, moves all extremities, no focal weakness. Ambulatory  Psych:  Calm, cooperative   extrem: Excluding right knee, No pitting edema, erythema, phlebitis, cellulitis, warm and well perfused  VAD:  Right arm PICC line     Isolation:      Lines/Tubes/Drains:    General Labs reviewed:  Recent Labs   Lab 09/12/19  1054 09/12/19 1847   WBC 7.97 11.69   HGB 9.4* 9.6*   HCT 30.8* 31.3*   * 429*       Recent Labs   Lab 09/12/19  1054 09/12/19 1847    134*   K 4.1 4.0    99   CO2 26 24   BUN 18 16   CREATININE 1.0 1.1   CALCIUM 9.3 9.2           Micro:  Microbiology Results (last 7 days)     Procedure Component Value Units Date/Time    Culture, Anaerobic [[id number]] Collected:  09/12/19 1521    Order Status:  Sent Specimen:  Body Fluid from Knee, Right Updated:  09/12/19 2312    Aerobic culture [[id number]] Collected:  09/12/19 1521    Order Status:  Sent Specimen:  Body Fluid from Knee, Right Updated:  09/12/19 2311        Imaging Reviewed:      Cardiology:    IMPRESSION & PLAN   1.   S/p wound revision, debridement and placement of wound vac for dehiscing knee incision  2. Recent wound infection with Proteus, Ecoli and klebsiella on rocephin since original debridement  3. Poor wound healing due to edema, smoking, etc  4. TKA in situ  5. Perineal candidiasis    Recommendations:  Continue rocephin,   diflucan  Ok for Vanc pending repeat cultures    Will follow thanks

## 2019-09-13 NOTE — OP NOTE
DATE OF PROCEDURE:  09/12/2019    PREOPERATIVE DIAGNOSIS:  Right knee wound infection.    POSTOPERATIVE DIAGNOSIS:  Right knee wound infection.    PROCEDURES PERFORMED:  1.  Right knee incision and debridement of wound.  2.  Application of wound VAC dressing.    SURGEON:  Delio Chung M.D.    ASSISTANT:  Desi Mc.    ANESTHESIA:  General.    HISTORY:  Ms. Mueller is a 63-year-old woman who had a total knee done several   weeks ago.  She developed a superficial infection of the wound, which we   debrided and closed last week.  She subsequently had a breakdown of the wound   with dehiscence while she was in a rehab facility.  She presented back to our   office for wound evaluation.  We expressed to her that in our opinion, the wound   needed to be opened again and cleaned out along with the application of a   negative pressure dressing.  The risks and benefits of surgical intervention   including the potential for incomplete pain relief and the need for further   procedures were explained to the patient who expressed she understood and wished   to proceed.  Informed consent was obtained.    PROCEDURE IN DETAIL:  After verifying informed consent and correct site, the   patient was brought back to the Operating Room and placed on the OR table in   supine position.  Preoperative IV antibiotics were administered and a timeout   was performed.  The patient's right lower extremity was then prepped and draped   in sterile fashion.  We began by removing all the previous sutures.  We then   sharply debrided the fibrinous and necrotic tissue that was in the wound.  There   was a large area directly over the patella and all of the skin edges had to be   sharply debrided to remove necrotic material.  Based on our previous surgery   last week, there was healing of the subcutaneous tracts, which had previously   existed that were now closed and the cellulitis of the tissue surrounding the   wound was significantly  decreased.  Inspection of the knee arthrotomy showed a   healed incision with no obvious involvement of the knee joint itself.  At this   point, with our removal of necrotic and nonviable tissue complete, we then   copiously irrigated the wound using a Pulsavac.  Following that, we applied a   wound VAC negative pressure dressing.  The patient was then awoken from   anesthesia, extubated, and brought to the PACU in good condition.    TOTAL TOURNIQUET TIME:  None.    DRAINS:  None.    SPECIMENS:  Cultures for aerobic and anaerobic.    ESTIMATED BLOOD LOSS:  Minimal.    POSTOPERATIVE PLAN:  The patient will be admitted for postoperative care and IV   antibiotics.      MARY/KO  dd: 09/12/2019 16:13:18 (CDT)  td: 09/12/2019 20:34:15 (CDT)  Doc ID   #[id number]  Job ID #[id number]    CC:

## 2019-09-13 NOTE — PHYSICIAN QUERY
"PT Name: Iris Mueller  MR #: [id number]    Physician Query Form - Procedure Clarification     CDS: Melissa Patten RN, CCDS         Contact information :ext [phone])  [email or web address]     This form is a permanent document in the medical record.     Query Date: September 13, 2019  By submitting this query, we are merely seeking further clarification of documentation. Please utilize your independent clinical judgment when addressing the question(s) below.    The Medical record contains the following:     Indicators       Supporting Clinical Findings   Location in Medical Record   x Documentation of "Debridement"   "Right knee incision and debridement of wound."  "We then sharply debrided the fibrinous and necrotic tissue that was in the wound.  There   was a large area directly over the patella and all of the skin edges had to be sharply debrided to remove necrotic material.  Based on our previous surgery last week, there was healing of the subcutaneous tracts, which had previously   existed that were now closed and the cellulitis of the tissue surrounding the wound was significantly decreased.  Inspection of the knee arthrotomy showed a   healed incision with no obvious involvement of the knee joint itself." Op Note 9/12/19    Documentation of "I & D"      EBL =      Other:       Excisional debridement is a surgical removal of  nonvitalized tissue, necrosis or slough. The use of a sharp instrument does not always indicate that an excisional debridement was performed.  Non excisional debridement is the scraping, washing, irrigating, brushing away or removal of loose tissue fragments.    Provider, please specify type of procedure(s) performed:  Please document type of debridement as excisional vs. non-excisional and please document depth of tissue debrided.    [    ]  Excisional Debridement (Specify site and depth of tissue removed)       *Depth of tissue excised:       [    ] Skin [   x ] " Subcutaneous Tissue/Fascia [    ] Muscle [    ] Tendon [    ] Other, ___   [    ]  Non-excisional Debridement       *Depth of tissue excised:       [    ] Skin [    ] Subcutaneous Tissue/Fascia [    ] Muscle [    ] Tendon [    ] Other, ___   [    ] Other Procedure (Specify) ________         [  ] Clinically Undetermined

## 2019-09-13 NOTE — PLAN OF CARE
Problem: Adult Inpatient Plan of Care  Goal: Plan of Care Review  Outcome: Ongoing (interventions implemented as appropriate)  POC care discussed with patient, verbalized understanding. Patient with uneventful night, slept off and on between care. Wound vac to R knee remains intact to suction as ordered with minimal drainage noted. Pulse check with doppler, foot remains very edematous, yet warm to touch. VS stable. Medicated with Morphine as needed for pain to surgical leg. Pure wick in use, adequate urine output noted. Receiving abx and IV fluids via R upper arm PICC line. Call light at bedside.

## 2019-09-13 NOTE — PROGRESS NOTES
[facility] Hospital Medicine  Progress Note    Patient Name: Iris Mueller  MRN: [id number]  Patient Class: IP- Inpatient   Admission Date: 9/12/2019  Length of Stay: 1 days  Attending Physician: Paige Wylie MD  Primary Care Provider: Elkin You MD        Subjective:     Principal Problem:Infection of right knee        HPI:  The patient is a 63-year-old woman who underwent a right total knee arthroplasty August 14th of this year.  She was admitted August 30th with infection in the right knee and had an I and D done.  She was placed on Rocephin and discharged to a skilled nursing facility.  Dr. Chung took her back to the OR today for another I and D and placement of a wound VAC.  The wound was not healing properly and the sutures had become undone.      The patient was otherwise doing well at the skilled nursing facility.  She has not had any fever or chills.  She has been walking with assistance.  She has not had any falls.    Overview/Hospital Course:  No notes on file    Interval History:  No new issues overnight.  Patient reports increased swelling to right lower extremity.  Blood glucose elevated this morning.  Pain is controlled with medication.  Appears comfortable.  Significant debility.    Review of Systems   Constitutional: Positive for activity change and fatigue. Negative for diaphoresis and fever.   Respiratory: Positive for shortness of breath (VALERIO). Negative for cough.    Cardiovascular: Positive for leg swelling. Negative for chest pain.   Gastrointestinal: Negative for abdominal distention, abdominal pain and constipation.   Genitourinary: Negative for dysuria, flank pain and hematuria.   Musculoskeletal: Positive for arthralgias and gait problem.   Skin: Positive for wound (right knee). Negative for rash.   Neurological: Negative for speech difficulty and numbness.   Psychiatric/Behavioral: Negative for agitation and confusion.     Objective:     Vital  Signs (Most Recent):  Temp: 98 °F (36.7 °C) (09/13/19 1152)  Pulse: 64 (09/13/19 1152)  Resp: 18 (09/13/19 1152)  BP: 101/65 (09/13/19 1152)  SpO2: 98 % (09/13/19 1152) Vital Signs (24h Range):  Temp:  [96 °F (35.6 °C)-98.2 °F (36.8 °C)] 98 °F (36.7 °C)  Pulse:  [60-70] 64  Resp:  [10-18] 18  SpO2:  [95 %-100 %] 98 %  BP: ()/(54-86) 101/65     Weight: 127 kg (280 lb)  Body mass index is 51.21 kg/m².    Intake/Output Summary (Last 24 hours) at 9/13/2019 1328  Last data filed at 9/13/2019 0530  Gross per 24 hour   Intake 1970 ml   Output 1100 ml   Net 870 ml      Physical Exam   Constitutional: She is oriented to person, place, and time. She appears well-developed and well-nourished.   HENT:   Head: Normocephalic and atraumatic.   Right Ear: External ear normal.   Left Ear: External ear normal.   Mouth/Throat: Oropharynx is clear and moist.   Eyes: Pupils are equal, round, and reactive to light. Conjunctivae and EOM are normal.   Neck: Normal range of motion. Neck supple. No JVD present.   Cardiovascular: Normal rate, regular rhythm, normal heart sounds and intact distal pulses.   No murmur heard.  +2 to 3 pitting edema to right lower extremity +1 to right lower extremity.   Pulmonary/Chest: Effort normal and breath sounds normal. She has no wheezes.   Abdominal: Soft. Bowel sounds are normal. There is no tenderness. There is no guarding.   Grossly protuberant   Musculoskeletal: Normal range of motion. She exhibits edema and tenderness.   Wound to right knee with wound VAC.  Dressing intact   Neurological: She is alert and oriented to person, place, and time. Coordination normal.   Skin: Skin is warm and dry.   Wound VAC in place to right knee.  Chronic skin changes bilaterally.   Psychiatric: She has a normal mood and affect. Her behavior is normal. Judgment normal.   Nursing note and vitals reviewed.      Significant Labs:   BMP:   Recent Labs   Lab 09/12/19  1847   *   *   K 4.0   CL 99   CO2 24    BUN 16   CREATININE 1.1   CALCIUM 9.2     CBC:   Recent Labs   Lab 09/12/19  1054 09/12/19  1847   WBC 7.97 11.69   HGB 9.4* 9.6*   HCT 30.8* 31.3*   * 429*       Significant Imaging:  No new imaging      Assessment/Plan:      * Infection of right knee  The patient had another I and D of the right knee on 09/12/2019 and a wound VAC was placed.  Dr. To saw the patient today and will continue IV antibiotics.  She is currently on vancomycin and Ancef IV.  Wound care consulted    Hyperglycemia  Check hemoglobin A1c.  Add Accu-Cheks and insulin as needed.      Hyponatremia  Slightly decreased.  Monitor BMP.      Chronic kidney disease, stage III (moderate)  Chronic.  At baseline renal function.  Continue to monitor renal function      Surgical wound dehiscence, subsequent encounter  Wound Care consulted.  Wound VAC in place      Thrombocytosis  This is probably reactive thrombocytosis      Normocytic anemia  The patient has been anemic for several months and her hemoglobin is at baseline  Check iron studies      Obesity, Class III, BMI 40-49.9 (morbid obesity)  Body mass index is 51.21 kg/m².  General weight loss/lifestyle modification strategies discussed (elicit support from others; identify saboteurs; non-food rewards, etc).        Essential hypertension  Her blood pressure is on the low side right now therefore I will hold her antihypertensives      Mild asthma without complication  Stable.  No evidence of exacerbation.       VTE Risk Mitigation (From admission, onward)        Ordered     IP VTE HIGH RISK PATIENT  Once      09/12/19 1719     Place sequential compression device  Until discontinued      09/12/19 1719     Place KAROLINA hose  Until discontinued      09/12/19 1719     Place sequential compression device  Until discontinued      09/12/19 1017     Place KAROLINA hose  Until discontinued      09/12/19 1017        Lasix 40 mg IV x1 dose for peripheral edema.        Carolyne Hale NP  Department of  Hospital Medicine [facility]

## 2019-09-13 NOTE — PLAN OF CARE
Met with pt to complete her assessment and readmission questionnaire.  Pt's spouse was in the room.  Pt, who needs assistance with her self care, was admitted from [facility].  Pt's discharge disposition is to return to [facility] pending auth from [facility].  Obtained signature for the disclosure form.       09/13/19 9957   Discharge Assessment   Assessment Type Discharge Planning Assessment   Confirmed/corrected address and phone number on facesheet? Yes   Assessment information obtained from? Patient   Prior to hospitilization cognitive status: Alert/Oriented   Prior to hospitalization functional status: Independent   Current cognitive status: Alert/Oriented   Current Functional Status: Independent   Lives With facility resident   Able to Return to Prior Arrangements yes   Is patient able to care for self after discharge? No   Who are your caregiver(s) and their phone number(s)?   (daughter Barbara Ribeiro, [phone])   Patient's perception of discharge disposition skilled nursing facility   Readmission Within the Last 30 Days previous discharge plan unsuccessful   If yes, most recent facility name:   ([facility])   Patient currently being followed by outpatient case management? No   Equipment Currently Used at Home bedside commode;walker, rolling;wheelchair   Do you have any problems affording any of your prescribed medications? No   Is the patient taking medications as prescribed? yes   Does the patient have transportation home? Yes   Transportation Anticipated agency   Does the patient receive services at the [facility]? No   Discharge Plan A Skilled Nursing Facility  ([place])   DME Needed Upon Discharge  none   Patient/Family in Agreement with Plan yes   Readmission Questionnaire   At the time of your discharge, did someone talk to you about what your health problems were? Yes   At the time of discharge, did someone talk to you about what to watch out for regarding worsening of your health problem? Yes   At the  "time of discharge, did someone talk to you about what to do if you experienced worsening of your health problem? Yes   At the time of discharge, did someone talk to you about which medication to take when you left the hospital and which ones to stop taking? Yes   At the time of discharge, did someone talk to you about when and where to follow up with a doctor after you left the hospital? Yes   What do you believe caused you to be sick enough to be re-admitted?   ("knee swelling and hot")   How often do you need to have someone help you when you read instructions, pamphlets, or other written material from your doctor or pharmacy? Rarely   Do you have problems taking your medications as prescribed? No   Do you have any problems affording any of  your prescribed medications? To be determined   Do you have problems obtaining/receiving your medications? No   Does the patient have transportation to healthcare appointments? Yes   Does the patient have family/friends to help with healtcare needs after discharge? yes   Does your caregiver provide all the help you need? Yes   Are you currently feeling confused? No   Are you currently having problems thinking? No   Are you currently having memory problems? No   Have you felt down, depressed, or hopeless? 3  (Pt is currently being treated with an antidepressant. )   Have you felt little interest or pleasure in doing things? 0   In the last 7 days, my sleep quality was: very poor     "

## 2019-09-13 NOTE — SUBJECTIVE & OBJECTIVE
Interval History:  No new issues overnight.  Patient reports increased swelling to right lower extremity.  Blood glucose elevated this morning.  Pain is controlled with medication.  Appears comfortable.  Significant debility.    Review of Systems   Constitutional: Positive for activity change and fatigue. Negative for diaphoresis and fever.   Respiratory: Positive for shortness of breath (VALERIO). Negative for cough.    Cardiovascular: Positive for leg swelling. Negative for chest pain.   Gastrointestinal: Negative for abdominal distention, abdominal pain and constipation.   Genitourinary: Negative for dysuria, flank pain and hematuria.   Musculoskeletal: Positive for arthralgias and gait problem.   Skin: Positive for wound (right knee). Negative for rash.   Neurological: Negative for speech difficulty and numbness.   Psychiatric/Behavioral: Negative for agitation and confusion.     Objective:     Vital Signs (Most Recent):  Temp: 98 °F (36.7 °C) (09/13/19 1152)  Pulse: 64 (09/13/19 1152)  Resp: 18 (09/13/19 1152)  BP: 101/65 (09/13/19 1152)  SpO2: 98 % (09/13/19 1152) Vital Signs (24h Range):  Temp:  [96 °F (35.6 °C)-98.2 °F (36.8 °C)] 98 °F (36.7 °C)  Pulse:  [60-70] 64  Resp:  [10-18] 18  SpO2:  [95 %-100 %] 98 %  BP: ()/(54-86) 101/65     Weight: 127 kg (280 lb)  Body mass index is 51.21 kg/m².    Intake/Output Summary (Last 24 hours) at 9/13/2019 1328  Last data filed at 9/13/2019 0530  Gross per 24 hour   Intake 1970 ml   Output 1100 ml   Net 870 ml      Physical Exam   Constitutional: She is oriented to person, place, and time. She appears well-developed and well-nourished.   HENT:   Head: Normocephalic and atraumatic.   Right Ear: External ear normal.   Left Ear: External ear normal.   Mouth/Throat: Oropharynx is clear and moist.   Eyes: Pupils are equal, round, and reactive to light. Conjunctivae and EOM are normal.   Neck: Normal range of motion. Neck supple. No JVD present.   Cardiovascular: Normal  rate, regular rhythm, normal heart sounds and intact distal pulses.   No murmur heard.  +2 to 3 pitting edema to right lower extremity +1 to right lower extremity.   Pulmonary/Chest: Effort normal and breath sounds normal. She has no wheezes.   Abdominal: Soft. Bowel sounds are normal. There is no tenderness. There is no guarding.   Grossly protuberant   Musculoskeletal: Normal range of motion. She exhibits edema and tenderness.   Wound to right knee with wound VAC.  Dressing intact   Neurological: She is alert and oriented to person, place, and time. Coordination normal.   Skin: Skin is warm and dry.   Wound VAC in place to right knee.  Chronic skin changes bilaterally.   Psychiatric: She has a normal mood and affect. Her behavior is normal. Judgment normal.   Nursing note and vitals reviewed.      Significant Labs:   BMP:   Recent Labs   Lab 09/12/19  1847   *   *   K 4.0   CL 99   CO2 24   BUN 16   CREATININE 1.1   CALCIUM 9.2     CBC:   Recent Labs   Lab 09/12/19  1054 09/12/19  1847   WBC 7.97 11.69   HGB 9.4* 9.6*   HCT 30.8* 31.3*   * 429*       Significant Imaging:  No new imaging

## 2019-09-13 NOTE — ASSESSMENT & PLAN NOTE
Body mass index is 51.21 kg/m².  General weight loss/lifestyle modification strategies discussed (elicit support from others; identify saboteurs; non-food rewards, etc).

## 2019-09-13 NOTE — CARE UPDATE
09/13/19 0732   Patient Assessment/Suction   Level of Consciousness (AVPU) alert   Respiratory Effort Normal;Unlabored   All Lung Fields Breath Sounds diminished   PRE-TX-O2   O2 Device (Oxygen Therapy) room air   SpO2 97 %   Pulse Oximetry Type Intermittent   $ Pulse Oximetry - Multiple Charge Pulse Oximetry - Multiple   Pulse 62   Resp 16   Aerosol Therapy   $ Aerosol Therapy Charges PRN treatment not required   Respiratory Treatment Status (SVN) PRN treatment not required   Alb PRN daily, pox, vitals as charted.

## 2019-09-13 NOTE — CONSULTS
Consult Note  Infectious Disease    Reason for Consult:  ID follow-up    HPI: Iris Mueller is a  63 y.o. female who underwent a right TKA on 2019 by Dr. Chung  she  presented to ED on 2019 with  bloody and foul-smelling drainage  from right knee incision which was partially dehisced after hematoma was expressed from the wound.  She required debridement, suffered a sepsis syndrome and required a short ICU stay with wound cultures growing Proteus, Klebsiella and E coli.  This wound infection was felt to be superficial and not involve the joint and she was ultimately discharged to skilled nursing facility on Rocephin with leg in straight leg position to avoid further dehiscence.  She was sent back to the hospital for revision of the wound after it deteriorated and was taken to surgery today where it was debrided and wound VAC was placed .  New cultures were submitted and she was given vancomycin.  Images of the wound were seen on her daughter's phone with broken sutures, slough within the widening incision and with no surrounding cellulitis of significance.  She did not have any fever, chills, sweats or constitutional symptoms.  Her only abscess event while on the antibiotics was mild perineal candidiasis.     Review of patient's allergies indicates:  No Known Allergies  Past Medical History:   Diagnosis Date    Arthritis     Asthma     Encounter for blood transfusion     Hypertension     Wound infection 2019, right knee    Past Surgical History:   Procedure Laterality Date    ARTHROPLASTY, KNEE Right 2019    [facility] OR     SECTION      INCISION AND DRAINAGE, LOWER EXTREMITY Right 2019    [facility] by [facility] at [facility] OR    TONSILLECTOMY       Social History     Socioeconomic History    Marital status:      Spouse name: Not on file    Number of children: Not on file    Years of education: Not on file     Highest education level: Not on file   Occupational History    Not on file   Social Needs    Financial resource strain: Not on file    Food insecurity:     Worry: Not on file     Inability: Not on file    Transportation needs:     Medical: Not on file     Non-medical: Not on file   Tobacco Use    Smoking status: Light Tobacco Smoker     Packs/day: 0.50     Years: 30.00     Pack years: 15.00     Types: Cigarettes    Smokeless tobacco: Never Used   Substance and Sexual Activity    Alcohol use: Yes     Comment: RARELY    Drug use: Never    Sexual activity: Not on file   Lifestyle    Physical activity:     Days per week: Not on file     Minutes per session: Not on file    Stress: Not on file   Relationships    Social connections:     Talks on phone: Not on file     Gets together: Not on file     Attends Holiness service: Not on file     Active member of club or organization: Not on file     Attends meetings of clubs or organizations: Not on file     Relationship status: Not on file   Other Topics Concern    Not on file   Social History Narrative    Not on file     Family History   Problem Relation Age of Onset    Alzheimer's disease Mother        Pertinent medications noted:     Review of Systems:   No chills, fever, sweats,          No pain in mouth or throat. No problems with teeth, gums.  No chest pain,   No   shortness of breath,   No nausea, vomiting, diarrhea,    No dysuria,    Continued swelling of joints, redness of of skin and worsening of wound  No unusual headaches,  , falls  No diabetes,      bleeding, no lymphadenopathy,      EXAM & DIAGNOSTICS REVIEWED:   Vitals:     Temp:  [97.8 °F (36.6 °C)-98 °F (36.7 °C)]   Temp: 97.8 °F (36.6 °C) (09/12/19 1715)  Pulse: 65 (09/12/19 1715)  Resp: 18 (09/12/19 1715)  BP: 105/65 (09/12/19 1715)  SpO2: 98 % (09/12/19 1715)    Intake/Output Summary (Last 24 hours) at 9/12/2019 1924  Last data filed at 9/12/2019 1700  Gross per 24 hour   Intake 800 ml    Output 100 ml   Net 700 ml       General:  In NAD. Alert and attentive, cooperative, comfortable  Eyes:  Anicteric, PERRL, EOMI  ENT:  No ulcers, exudates, thrush, nares patent,   Neck:  Supple,   Lungs: Clear,   Heart:  RRR,   Abd:  Soft, obese, NT, ND, normal BS, no masses or organomegaly appreciated.  :  Voids s  Musc:  Excluding right knee, no Joints without effusion, swelling, erythema, synovitis .   Skin:  No rashes.   Wound: Wound vac in place, no cellulitis, wound photos reviewed on daughter's phone    Neuro:   Alert, attentive, speech fluent, face symmetric, moves all extremities, no focal weakness. Ambulatory  Psych:  Calm, cooperative   extrem: Excluding right knee, No edema, erythema, phlebitis, cellulitis, warm and well perfused  VAD:  Right arm PICC line     Isolation:      Lines/Tubes/Drains:    General Labs reviewed:  Recent Labs   Lab 09/12/19  1054 09/12/19  1847   WBC 7.97 11.69   HGB 9.4* 9.6*   HCT 30.8* 31.3*   * 429*       Recent Labs   Lab 09/12/19  1054 09/12/19  1847    134*   K 4.1 4.0    99   CO2 26 24   BUN 18 16   CREATININE 1.0 1.1   CALCIUM 9.3 9.2           Micro:  Microbiology Results (last 7 days)     Procedure Component Value Units Date/Time    Aerobic culture [[id number]] Collected:  09/12/19 1521    Order Status:  Sent Specimen:  Body Fluid from Knee, Right Updated:  09/12/19 1522    Culture, Anaerobic [[id number]] Collected:  09/12/19 1521    Order Status:  Sent Specimen:  Body Fluid from Knee, Right Updated:  09/12/19 1522        Imaging Reviewed:      Cardiology:    IMPRESSION & PLAN   1.   S/p wound revision, debridement and placement of wound vac for dehiscing knee incision  2. Recent wound infection with Proteus, Ecoli and klebsiella on rocephin since original debridement  3. Poor wound healing due to edema, smoking, etc  4. TKA in situ  5. Perineal candidiasis    Recommendations:  Continue rocephin, add diflucan  Ok for Vanc pending repeat  cultures    Will follow thanks

## 2019-09-13 NOTE — PLAN OF CARE
Problem: Adult Inpatient Plan of Care  Goal: Patient-Specific Goal (Individualization)  Outcome: Ongoing (interventions implemented as appropriate)  Pt is  AAOX4. POC reviewed with pt. Pt verbalized understanding. VSS. Remains afebrile. IV lasix ordered to reduce swelling, Pt has pure wick in place to reduce ambulation and trips to the restroom and decrease falls.  Wound vac in place, seal in tact. IV abx infused per order. Pain controlled with PRN medications. Remains free of injury. Bed low, breaks locked, call light in reach. Will monitor.

## 2019-09-13 NOTE — ASSESSMENT & PLAN NOTE
The patient has been anemic for several months and her hemoglobin is at baseline  Check iron studies

## 2019-09-13 NOTE — ASSESSMENT & PLAN NOTE
The patient had another I and D of the right knee on 09/12/2019 and a wound VAC was placed.  Dr. To saw the patient today and will continue IV antibiotics.  She is currently on vancomycin and Ancef IV.  Wound care consulted

## 2019-09-13 NOTE — PT/OT/SLP EVAL
Physical Therapy Evaluation    Patient Name:  Iris Mueller   MRN:  [id number]    Recommendations:     Discharge Recommendations:  nursing facility, skilled   Discharge Equipment Recommendations: none   Barriers to discharge: Decreased caregiver support    Assessment:     Iris Mueller is a 63 y.o. female admitted with a medical diagnosis of Infection of right knee.  She presents with the following impairments/functional limitations:  weakness, gait instability, decreased upper extremity function, impaired endurance, impaired balance, decreased lower extremity function, decreased safety awareness, pain, orthopedic precautions, impaired functional mobilty.  During PT tx, pt demonstrated supine<>sit with Min A, and sit<>Stand with RW and CGA.  She is WBAT on R LE with minimal knee flexion due to wound vac. Pt ambulated 175ft with RW, CGA and Chair follow.     Rehab Prognosis: Good; patient would benefit from acute skilled PT services to address these deficits and reach maximum level of function.    Recent Surgery: Procedure(s) (LRB):  INCISION AND DRAINAGE, LOWER EXTREMITY (Right)  REPLACEMENT, WOUND VAC (Right) 1 Day Post-Op    Plan:     During this hospitalization, patient to be seen daily to address the identified rehab impairments via gait training, therapeutic activities, therapeutic exercises and progress toward the following goals:    · Plan of Care Expires:  09/26/19    Subjective     Chief Complaint: none  Patient/Family Comments/goals: to get healed and back home  Pain/Comfort:  · Pain Rating 1: 5/10  · Location - Side 1: Right  · Location 1: knee  · Pain Addressed 1: Reposition, Distraction, Pre-medicate for activity    Patients cultural, spiritual, Holiness conflicts given the current situation: no    Living Environment:  Pt has been at [facility] since her last admission, however she normally lives with her  in a 1 level home with 1 step to enter.   Prior to admission,  patients level of function was supervision for ambulation with RW or mobility in her w/c.  Equipment used at home: wheelchair, walker, rolling, bedside commode.  DME owned (not currently used): none.  Upon discharge, patient will have assistance from .    Objective:     Communicated with nurse Sandoval prior to session.  Patient found HOB elevated with peripheral IV, wound vac  upon PT entry to room.    General Precautions: Standard, fall   Orthopedic Precautions:(WBAT and minimal knee flexion)   Braces: N/A     Exams:  · Cognitive Exam:  Patient is oriented to Person, Place, Time and Situation  · Postural Exam:  Patient presented with the following abnormalities:    · -       Rounded shoulders  · -       Forward head  · RLE ROM: WFL except knee NT due to surgical restrictions and minimal flexion  · RLE Strength: Deficits: grossly 3/5  · LLE ROM: WFL  · LLE Strength: WFL    Functional Mobility:  · Bed Mobility:     · Supine to Sit: minimum assistance and to hold R LE in extension   · Transfers:     · Sit to Stand:  contact guard assistance with rolling walker  · Gait: 175ft with RW, CGA, and chair follow  · Balance: fair      AM-PAC 6 CLICK MOBILITY  Total Score:20     Patient left up in chair with all lines intact, call button in reach, chair alarm on and nurse Sandoval notified.    GOALS:   Multidisciplinary Problems     Physical Therapy Goals        Problem: Physical Therapy Goal    Goal Priority Disciplines Outcome Goal Variances Interventions   Physical Therapy Goal     PT, PT/OT Ongoing (interventions implemented as appropriate)     Description:  Goals to be met by: 2019     Patient will increase functional independence with mobility by performin. Supine to sit with Modified [place]  2. Sit to supine with Modified [place]  3. Sit to stand transfer with Contact Guard Assistance  4. Bed to chair transfer with Contact Guard Assistance using Rolling Walker  5. Gait  x 250 feet with Contact  Guard Assistance using Rolling Walker.   6. Lower extremity exercise program x10-20 reps per handout, with independence                      History:     Past Medical History:   Diagnosis Date    Arthritis     Asthma     Encounter for blood transfusion     Hypertension        Past Surgical History:   Procedure Laterality Date    ARTHROPLASTY, KNEE Right 2019    Performed [facility]     SECTION      INCISION AND DRAINAGE, LOWER EXTREMITY Right 2019    Performed by [facility] at [facility]    INCISION AND DRAINAGE, LOWER EXTREMITY Right 2019    Performed by [facility] at [facility]    REPLACEMENT, WOUND VAC Right 2019    Performed by [facility] at [facility]    TONSILLECTOMY         Time Tracking:     PT Received On: 19  PT Start Time: 1040     PT Stop Time: 1109  PT Total Time (min): 29 min     Billable Minutes: Evaluation 15 and Gait Training 14      Jazmyne Coats, PT  2019

## 2019-09-14 PROBLEM — I50.31 ACUTE DIASTOLIC HEART FAILURE: Status: ACTIVE | Noted: 2019-09-14

## 2019-09-14 LAB
ANION GAP SERPL CALC-SCNC: 11 MMOL/L (ref 8–16)
BUN SERPL-MCNC: 16 MG/DL (ref 8–23)
CALCIUM SERPL-MCNC: 9.4 MG/DL (ref 8.7–10.5)
CHLORIDE SERPL-SCNC: 97 MMOL/L (ref 95–110)
CO2 SERPL-SCNC: 28 MMOL/L (ref 23–29)
CREAT SERPL-MCNC: 1.1 MG/DL (ref 0.5–1.4)
EST. GFR  (AFRICAN AMERICAN): >60 ML/MIN/1.73 M^2
EST. GFR  (NON AFRICAN AMERICAN): 53 ML/MIN/1.73 M^2
ESTIMATED AVG GLUCOSE: 94 MG/DL (ref 68–131)
GLUCOSE SERPL-MCNC: 95 MG/DL (ref 70–110)
HBA1C MFR BLD HPLC: 4.9 % (ref 4–5.6)
IRON SERPL-MCNC: 19 UG/DL (ref 30–160)
POCT GLUCOSE: 108 MG/DL (ref 70–110)
POTASSIUM SERPL-SCNC: 4 MMOL/L (ref 3.5–5.1)
SATURATED IRON: 5 % (ref 20–50)
SODIUM SERPL-SCNC: 136 MMOL/L (ref 136–145)
TOTAL IRON BINDING CAPACITY: 413 UG/DL (ref 250–450)
TRANSFERRIN SERPL-MCNC: 279 MG/DL (ref 200–375)
VANCOMYCIN TROUGH SERPL-MCNC: 12.4 UG/ML (ref 10–22)

## 2019-09-14 PROCEDURE — 97530 THERAPEUTIC ACTIVITIES: CPT

## 2019-09-14 PROCEDURE — 25000003 PHARM REV CODE 250: Performed by: ORTHOPAEDIC SURGERY

## 2019-09-14 PROCEDURE — 99231 SBSQ HOSP IP/OBS SF/LOW 25: CPT | Mod: S$GLB,,, | Performed by: INTERNAL MEDICINE

## 2019-09-14 PROCEDURE — 80048 BASIC METABOLIC PNL TOTAL CA: CPT

## 2019-09-14 PROCEDURE — 25500020 PHARM REV CODE 255: Performed by: SPECIALIST

## 2019-09-14 PROCEDURE — 83036 HEMOGLOBIN GLYCOSYLATED A1C: CPT

## 2019-09-14 PROCEDURE — 25000003 PHARM REV CODE 250: Performed by: INTERNAL MEDICINE

## 2019-09-14 PROCEDURE — 25000003 PHARM REV CODE 250: Performed by: NURSE PRACTITIONER

## 2019-09-14 PROCEDURE — 99231 PR SUBSEQUENT HOSPITAL CARE,LEVL I: ICD-10-PCS | Mod: S$GLB,,, | Performed by: INTERNAL MEDICINE

## 2019-09-14 PROCEDURE — 99900035 HC TECH TIME PER 15 MIN (STAT)

## 2019-09-14 PROCEDURE — 63600175 PHARM REV CODE 636 W HCPCS: Performed by: INTERNAL MEDICINE

## 2019-09-14 PROCEDURE — 97116 GAIT TRAINING THERAPY: CPT

## 2019-09-14 PROCEDURE — 63600175 PHARM REV CODE 636 W HCPCS: Performed by: ORTHOPAEDIC SURGERY

## 2019-09-14 PROCEDURE — 63600175 PHARM REV CODE 636 W HCPCS: Performed by: NURSE PRACTITIONER

## 2019-09-14 PROCEDURE — 12000002 HC ACUTE/MED SURGE SEMI-PRIVATE ROOM

## 2019-09-14 PROCEDURE — 80202 ASSAY OF VANCOMYCIN: CPT

## 2019-09-14 PROCEDURE — 94761 N-INVAS EAR/PLS OXIMETRY MLT: CPT

## 2019-09-14 RX ORDER — DOXYLAMINE SUCCINATE 25 MG
TABLET ORAL 2 TIMES DAILY
Status: DISCONTINUED | OUTPATIENT
Start: 2019-09-14 | End: 2019-09-18 | Stop reason: HOSPADM

## 2019-09-14 RX ORDER — FUROSEMIDE 10 MG/ML
40 INJECTION INTRAMUSCULAR; INTRAVENOUS DAILY
Status: DISCONTINUED | OUTPATIENT
Start: 2019-09-14 | End: 2019-09-16

## 2019-09-14 RX ORDER — FERROUS SULFATE 325(65) MG
325 TABLET, DELAYED RELEASE (ENTERIC COATED) ORAL DAILY
Status: DISCONTINUED | OUTPATIENT
Start: 2019-09-14 | End: 2019-09-18 | Stop reason: HOSPADM

## 2019-09-14 RX ORDER — LEVOFLOXACIN 500 MG/1
500 TABLET, FILM COATED ORAL DAILY
Status: DISCONTINUED | OUTPATIENT
Start: 2019-09-14 | End: 2019-09-18 | Stop reason: HOSPADM

## 2019-09-14 RX ORDER — FUROSEMIDE 10 MG/ML
20 INJECTION INTRAMUSCULAR; INTRAVENOUS ONCE
Status: DISCONTINUED | OUTPATIENT
Start: 2019-09-14 | End: 2019-09-14

## 2019-09-14 RX ADMIN — FUROSEMIDE 40 MG: 10 INJECTION, SOLUTION INTRAMUSCULAR; INTRAVENOUS at 03:09

## 2019-09-14 RX ADMIN — FLUCONAZOLE 200 MG: 100 TABLET ORAL at 09:09

## 2019-09-14 RX ADMIN — BUPROPION HYDROCHLORIDE 75 MG: 75 TABLET, FILM COATED ORAL at 09:09

## 2019-09-14 RX ADMIN — GABAPENTIN 100 MG: 100 CAPSULE ORAL at 09:09

## 2019-09-14 RX ADMIN — TRAZODONE HYDROCHLORIDE 50 MG: 50 TABLET ORAL at 09:09

## 2019-09-14 RX ADMIN — LOVASTATIN 20 MG: 20 TABLET ORAL at 09:09

## 2019-09-14 RX ADMIN — LEVOFLOXACIN 500 MG: 500 TABLET, FILM COATED ORAL at 01:09

## 2019-09-14 RX ADMIN — HYDROCODONE BITARTRATE AND ACETAMINOPHEN 1 TABLET: 10; 325 TABLET ORAL at 05:09

## 2019-09-14 RX ADMIN — FERROUS SULFATE TAB EC 325 MG (65 MG FE EQUIVALENT) 325 MG: 325 (65 FE) TABLET DELAYED RESPONSE at 11:09

## 2019-09-14 RX ADMIN — IRON SUCROSE 200 MG: 20 INJECTION, SOLUTION INTRAVENOUS at 11:09

## 2019-09-14 RX ADMIN — PANTOPRAZOLE SODIUM 40 MG: 40 TABLET, DELAYED RELEASE ORAL at 09:09

## 2019-09-14 RX ADMIN — VANCOMYCIN HYDROCHLORIDE 1000 MG: 1 INJECTION, POWDER, LYOPHILIZED, FOR SOLUTION INTRAVENOUS at 05:09

## 2019-09-14 RX ADMIN — HYDROCODONE BITARTRATE AND ACETAMINOPHEN 1 TABLET: 10; 325 TABLET ORAL at 09:09

## 2019-09-14 RX ADMIN — MICONAZOLE NITRATE: 20 CREAM TOPICAL at 03:09

## 2019-09-14 RX ADMIN — GABAPENTIN 100 MG: 100 CAPSULE ORAL at 03:09

## 2019-09-14 RX ADMIN — SULFUR HEXAFLUORIDE 2.4 ML: KIT at 02:09

## 2019-09-14 RX ADMIN — CEFTRIAXONE 2 G: 2 INJECTION, SOLUTION INTRAVENOUS at 09:09

## 2019-09-14 RX ADMIN — ALTEPLASE 2 MG: 2.2 INJECTION, POWDER, LYOPHILIZED, FOR SOLUTION INTRAVENOUS at 03:09

## 2019-09-14 RX ADMIN — FLUOXETINE 40 MG: 20 CAPSULE ORAL at 09:09

## 2019-09-14 NOTE — PROGRESS NOTES
Post-op    Pt AAOx3. controlled. Ambulated with PT. Sitting in chair.    Right knee - wound vac in place. No redness. Minimal swelling.    Plan  - Continue abx pending new cultures. Appreciate Dr. To's help. Continue PT. Wound vac care per wound care nurse.

## 2019-09-14 NOTE — PLAN OF CARE
Problem: Adult Inpatient Plan of Care  Goal: Plan of Care Review  Outcome: Ongoing (interventions implemented as appropriate)  Patient is alert, responsive, in NAD. Plan of care reviewed. Safety measures maintained. No falls/injury. Will continue to monitor.

## 2019-09-14 NOTE — PROGRESS NOTES
Consult Note  Infectious Disease    Reason for Consult:  ID follow-up    HPI: Iris Mueller is a  63 y.o. female who underwent a right TKA on 08/14/2019 by Dr. Chung  she  presented to ED on 08/29/2019 with  bloody and foul-smelling drainage  from right knee incision which was partially dehisced after hematoma was expressed from the wound.  She required debridement, suffered a sepsis syndrome and required a short ICU stay with wound cultures growing Proteus, Klebsiella and E coli.  This wound infection was felt to be superficial and not involve the joint and she was ultimately discharged to skilled nursing facility on Rocephin with leg in straight leg position to avoid further dehiscence.  She was sent back to the hospital for revision of the wound after it deteriorated and was taken to surgery today where it was debrided and wound VAC was placed .  New cultures were submitted and she was given vancomycin.  Images of the wound were seen on her daughter's phone with broken sutures, slough within the widening incision and with no surrounding cellulitis of significance.  She did not have any fever, chills, sweats or constitutional symptoms.  Her only abscess event while on the antibiotics was mild perineal candidiasis.    9/13:  In good spirits. Walked with PT. Received some lasix and voiding well. No antibiotic intolerance. Cultures are too young. D/w Dr. chung  9/14: in good spirits, ambulated with physical therapy. No lung, GI ir  problems. Cultures so far have proteus(which she had before) and stenotrophomonas. No staph. No picc problems. Still has some vaginal candida symptoms.          EXAM & DIAGNOSTICS REVIEWED:   Vitals:     Temp:  [97.2 °F (36.2 °C)-99 °F (37.2 °C)]   Temp: 99 °F (37.2 °C) (09/14/19 1217)  Pulse: 67 (09/14/19 1217)  Resp: 18 (09/14/19 1217)  BP: (!) 102/56 (09/14/19 1217)  SpO2: (!) 93 % (09/14/19 1217)    Intake/Output Summary (Last 24 hours) at 9/14/2019 1302  Last data  filed at 9/14/2019 0500  Gross per 24 hour   Intake 450 ml   Output 950 ml   Net -500 ml       General:  In NAD. Alert and attentive, cooperative, comfortable  Eyes:  Anicteric,   EOMI  ENT:      Neck:     Lungs:    Heart:     Abd:     :  Voids s  Musc:  Excluding right knee, no Joints without effusion, swelling, erythema, synovitis .   Skin:  No rashes.   Wound: Wound vac in place, no cellulitis, wound photos reviewed on daughter's phone    Neuro:   Alert, attentive, speech fluent, face symmetric, moves all extremities, no focal weakness. Ambulatory  Psych:  Calm, cooperative   extrem: Excluding right knee, No pitting edema, erythema, phlebitis, cellulitis, warm and well perfused  VAD:  Right arm PICC line     Isolation:      Lines/Tubes/Drains:    General Labs reviewed:  Recent Labs   Lab 09/12/19  1054 09/12/19  1847   WBC 7.97 11.69   HGB 9.4* 9.6*   HCT 30.8* 31.3*   * 429*       Recent Labs   Lab 09/12/19  1054 09/12/19  1847 09/14/19  0434    134* 136   K 4.1 4.0 4.0    99 97   CO2 26 24 28   BUN 18 16 16   CREATININE 1.0 1.1 1.1   CALCIUM 9.3 9.2 9.4           Micro:  Microbiology Results (last 7 days)     Procedure Component Value Units Date/Time    Aerobic culture [[id number]]  (Abnormal) Collected:  09/12/19 1521    Order Status:  Completed Specimen:  Body Fluid from Knee, Right Updated:  09/14/19 1243     Aerobic Bacterial Culture PRESUMPTIVE PROTEUS SPECIES  Moderate  Identification and susceptibility pending        STENOTROPHOMONAS (X.) MALTOPHILIA  Moderate  Susceptibility pending      Culture, Anaerobic [[id number]] Collected:  09/12/19 1521    Order Status:  Sent Specimen:  Body Fluid from Knee, Right Updated:  09/12/19 2312        Imaging Reviewed:      Cardiology:    IMPRESSION & PLAN   1.   S/p wound revision, debridement and placement of wound vac for dehiscing knee incision. Proteus and stentotrophomonas  2. Recent wound infection with Proteus, Ecoli and klebsiella on  rocephin since original debridement  3. Poor wound healing due to edema, smoking, etc  4. TKA in situ  5. Perineal candidiasis    Recommendations:  Continue rocephin,   diflucan  Discontinue vanc. Add levaquin for the stentotrophomonas pending sensitivities    Will follow thanks

## 2019-09-14 NOTE — ASSESSMENT & PLAN NOTE
The patient has been anemic for several months and her hemoglobin is at baseline  Check iron studies-Fe sat 5%. Add venofer x 1 dose and po fergon

## 2019-09-14 NOTE — PLAN OF CARE
Problem: Physical Therapy Goal  Goal: Physical Therapy Goal  Goals to be met by: 2019     Patient will increase functional independence with mobility by performin. Supine to sit with Modified [place]  2. Sit to supine with Modified [place]  3. Sit to stand transfer with Contact Guard Assistance  4. Bed to chair transfer with Contact Guard Assistance using Rolling Walker  5. Gait  x 250 feet with Contact Guard Assistance using Rolling Walker.   6. Lower extremity exercise program x10-20 reps per handout, with independence     Outcome: Ongoing (interventions implemented as appropriate)  Gait training and bed mobility performed to improve functional mobility to increase independence.

## 2019-09-14 NOTE — PROGRESS NOTES
[facility] Hospital Medicine  Progress Note    Patient Name: Iris Mueller  MRN: [id number]  Patient Class: IP- Inpatient   Admission Date: 9/12/2019  Length of Stay: 2 days  Attending Physician: Paige Wylie MD  Primary Care Provider: Elkin You MD        Subjective:     Principal Problem:Infection of right knee        HPI:  The patient is a 63-year-old woman who underwent a right total knee arthroplasty August 14th of this year.  She was admitted August 30th with infection in the right knee and had an I and D done.  She was placed on Rocephin and discharged to a skilled nursing facility.  Dr. Chung took her back to the OR today for another I and D and placement of a wound VAC.  The wound was not healing properly and the sutures had become undone.      The patient was otherwise doing well at the skilled nursing facility.  She has not had any fever or chills.  She has been walking with assistance.  She has not had any falls.    Overview/Hospital Course:  No notes on file    Interval History:  No new issues overnight.  Patient is sitting up in chair.  She ambulated using walker with PT this morning.  She reports slight improvement of peripheral edema. She states her leg swelling was inhibiting her from walking.  Pain controlled with medication    Review of Systems   Constitutional: Positive for activity change and fatigue. Negative for diaphoresis and fever.   Respiratory: Positive for shortness of breath (VALERIO). Negative for cough.    Cardiovascular: Positive for leg swelling. Negative for chest pain.   Gastrointestinal: Negative for abdominal distention, abdominal pain and constipation.   Genitourinary: Negative for dysuria, flank pain and hematuria.   Musculoskeletal: Positive for arthralgias and gait problem.   Skin: Positive for wound (right knee). Negative for rash.   Neurological: Negative for speech difficulty and numbness.   Psychiatric/Behavioral: Negative for  agitation and confusion.     Objective:     Vital Signs (Most Recent):  Temp: 99 °F (37.2 °C) (09/14/19 1217)  Pulse: 67 (09/14/19 1217)  Resp: 18 (09/14/19 1217)  BP: (!) 102/56 (09/14/19 1217)  SpO2: (!) 93 % (09/14/19 1217) Vital Signs (24h Range):  Temp:  [97.2 °F (36.2 °C)-99 °F (37.2 °C)] 99 °F (37.2 °C)  Pulse:  [60-91] 67  Resp:  [18-20] 18  SpO2:  [93 %-96 %] 93 %  BP: (102-153)/(56-75) 102/56     Weight: 127 kg (280 lb)  Body mass index is 51.21 kg/m².    Intake/Output Summary (Last 24 hours) at 9/14/2019 1242  Last data filed at 9/14/2019 0500  Gross per 24 hour   Intake 450 ml   Output 950 ml   Net -500 ml      Physical Exam   Constitutional: She is oriented to person, place, and time. She appears well-developed and well-nourished.   HENT:   Head: Normocephalic and atraumatic.   Right Ear: External ear normal.   Left Ear: External ear normal.   Mouth/Throat: Oropharynx is clear and moist.   Eyes: Pupils are equal, round, and reactive to light. Conjunctivae and EOM are normal.   Neck: Normal range of motion. Neck supple. No JVD present.   Cardiovascular: Normal rate, regular rhythm, normal heart sounds and intact distal pulses.   No murmur heard.  +2 to 3 pitting edema to right lower extremity +1 to right lower extremity.   Pulmonary/Chest: Effort normal and breath sounds normal. She has no wheezes.   Abdominal: Soft. Bowel sounds are normal. There is no tenderness. There is no guarding.   Grossly protuberant   Musculoskeletal: Normal range of motion. She exhibits edema and tenderness.   Wound to right knee with wound VAC.  Dressing intact   Neurological: She is alert and oriented to person, place, and time. Coordination normal.   Skin: Skin is warm and dry.   Wound VAC in place to right knee.  Chronic skin changes bilaterally.   Psychiatric: She has a normal mood and affect. Her behavior is normal. Judgment normal.   Nursing note and vitals reviewed.      Significant Labs:   BMP:   Recent Labs   Lab  09/14/19  0434   GLU 95      K 4.0   CL 97   CO2 28   BUN 16   CREATININE 1.1   CALCIUM 9.4     CBC:   Recent Labs   Lab 09/12/19  1847   WBC 11.69   HGB 9.6*   HCT 31.3*   *       Significant Imaging:  No new imaging      Assessment/Plan:      * Infection of right knee  The patient had another I and D of the right knee on 09/12/2019 and a wound VAC was placed.  Dr. To saw the patient today and will continue IV antibiotics.  She is currently on vancomycin and Ancef IV.  Wound care consulted    Hyperglycemia  Check hemoglobin A1c.  Add Accu-Cheks and insulin as needed.      Hyponatremia  Slightly decreased.  Monitor BMP.      Chronic kidney disease, stage III (moderate)  Chronic.  At baseline renal function.  Continue to monitor renal function      Surgical wound dehiscence, subsequent encounter  Wound Care consulted.  Wound VAC in place      Thrombocytosis  This is probably reactive thrombocytosis      Normocytic anemia  The patient has been anemic for several months and her hemoglobin is at baseline  Check iron studies-Fe sat 5%. Add venofer x 1 dose and po fergon      Obesity, Class III, BMI 40-49.9 (morbid obesity)  Body mass index is 51.21 kg/m².  General weight loss/lifestyle modification strategies discussed (elicit support from others; identify saboteurs; non-food rewards, etc).        Essential hypertension  Her blood pressure is on the low side right now therefore I will hold her antihypertensives      Mild asthma without complication  Stable.  No evidence of exacerbation.         VTE Risk Mitigation (From admission, onward)        Ordered     IP VTE HIGH RISK PATIENT  Once      09/12/19 1719     Place sequential compression device  Until discontinued      09/12/19 1719     Place KAROLINA hose  Until discontinued      09/12/19 1719                Carolyne Hale NP  Department of Hospital Medicine   [facility]

## 2019-09-14 NOTE — PT/OT/SLP PROGRESS
Physical Therapy Treatment    Patient Name:  Iris Mueller   MRN:  [id number]    Recommendations:     Discharge Recommendations:  home health PT, nursing facility, skilled   Discharge Equipment Recommendations: none   Barriers to discharge: None    Assessment:     Iris Mueller is a 63 y.o. female admitted with a medical diagnosis of Infection of right knee.  She presents with the following impairments/functional limitations:  weakness, impaired endurance, gait instability. Pt agreeable to therapy at this time. Pt performed bed mobility with CGA and gait training of 250' with no seated rest break needed. Pt tolerance to ambulation is improving with minor deficits. Pt needed minor cueing to stand erect and stay inside RW.     Rehab Prognosis: Good; patient would benefit from acute skilled PT services to address these deficits and reach maximum level of function.    Recent Surgery: Procedure(s) (LRB):  INCISION AND DRAINAGE, LOWER EXTREMITY (Right)  REPLACEMENT, WOUND VAC (Right) 2 Days Post-Op    Plan:     During this hospitalization, patient to be seen daily to address the identified rehab impairments via gait training, therapeutic exercises, therapeutic activities and progress toward the following goals:    · Plan of Care Expires:  09/26/19    Subjective     Chief Complaint: Wanting to go home.   Patient/Family Comments/goals: To be independent.   Pain/Comfort:  · Pain Rating 1: (Not rated. )  · Location - Side 1: Right  · Location 1: knee      Objective:     Communicated with nurse Sandoval prior to session.  Patient found HOB elevated with wound vac upon PT entry to room.     General Precautions: Standard, fall   Orthopedic Precautions:RLE weight bearing as tolerated   Braces: N/A     Functional Mobility:  · Bed Mobility:     · Rolling Left:  stand by assistance  · Scooting: stand by assistance  · Supine to Sit: contact guard assistance  · Transfers:     · Sit to Stand:  stand by assistance with  rolling walker  · Gait: 250' with RW, SBA. VC to stand erect and stay inside RW.      AM-PAC 6 CLICK MOBILITY          Therapeutic Activities and Exercises:      Patient left up in chair with all lines intact, call button in reach, chair alarm on and nurse Lori notified..    GOALS:   Multidisciplinary Problems     Physical Therapy Goals        Problem: Physical Therapy Goal    Goal Priority Disciplines Outcome Goal Variances Interventions   Physical Therapy Goal     PT, PT/OT Ongoing (interventions implemented as appropriate)     Description:  Goals to be met by: 2019     Patient will increase functional independence with mobility by performin. Supine to sit with Modified [place]  2. Sit to supine with Modified [place]  3. Sit to stand transfer with Contact Guard Assistance  4. Bed to chair transfer with Contact Guard Assistance using Rolling Walker  5. Gait  x 250 feet with Contact Guard Assistance using Rolling Walker.   6. Lower extremity exercise program x10-20 reps per handout, with independence                      Time Tracking:     PT Received On: 19  PT Start Time: 859     PT Stop Time: 924  PT Total Time (min): 25 min     Billable Minutes: Gait Training 25 minutes    Treatment Type: Treatment  PT/PTA: PTA     PTA Visit Number: 1     Rema Solano, PTA  2019

## 2019-09-14 NOTE — PLAN OF CARE
09/13/19 2000   Patient Assessment/Suction   Level of Consciousness (AVPU) alert   Respiratory Effort Normal;Unlabored   Rhythm/Pattern, Respiratory pattern regular   PRE-TX-O2   O2 Device (Oxygen Therapy) room air   SpO2 95 %   Pulse Oximetry Type Intermittent   Aerosol Therapy   $ Aerosol Therapy Charges PRN treatment not required

## 2019-09-14 NOTE — CARE UPDATE
09/14/19 0740   Patient Assessment/Suction   Level of Consciousness (AVPU) alert   Respiratory Effort Normal;Unlabored   ELIAS Breath Sounds clear   PRE-TX-O2   O2 Device (Oxygen Therapy) room air   SpO2 96 %   Pulse Oximetry Type Intermittent   $ Pulse Oximetry - Multiple Charge Pulse Oximetry - Multiple   Pulse 60   Resp 18   Aerosol Therapy   $ Aerosol Therapy Charges PRN treatment not required   Respiratory Treatment Status (SVN) PRN treatment not required

## 2019-09-14 NOTE — SUBJECTIVE & OBJECTIVE
Interval History:  No new issues overnight.  Patient reports increased swelling to right lower extremity.  Blood glucose elevated this morning.  Pain is controlled with medication.  Appears comfortable.  Significant debility.    Review of Systems   Constitutional: Positive for activity change and fatigue. Negative for diaphoresis and fever.   Respiratory: Positive for shortness of breath (VALERIO). Negative for cough.    Cardiovascular: Positive for leg swelling. Negative for chest pain.   Gastrointestinal: Negative for abdominal distention, abdominal pain and constipation.   Genitourinary: Negative for dysuria, flank pain and hematuria.   Musculoskeletal: Positive for arthralgias and gait problem.   Skin: Positive for wound (right knee). Negative for rash.   Neurological: Negative for speech difficulty and numbness.   Psychiatric/Behavioral: Negative for agitation and confusion.     Objective:     Vital Signs (Most Recent):  Temp: 99 °F (37.2 °C) (09/14/19 1217)  Pulse: 67 (09/14/19 1217)  Resp: 18 (09/14/19 1217)  BP: (!) 102/56 (09/14/19 1217)  SpO2: (!) 93 % (09/14/19 1217) Vital Signs (24h Range):  Temp:  [97.2 °F (36.2 °C)-99 °F (37.2 °C)] 99 °F (37.2 °C)  Pulse:  [60-91] 67  Resp:  [18-20] 18  SpO2:  [93 %-96 %] 93 %  BP: (102-153)/(56-75) 102/56     Weight: 127 kg (280 lb)  Body mass index is 51.21 kg/m².    Intake/Output Summary (Last 24 hours) at 9/14/2019 1242  Last data filed at 9/14/2019 0500  Gross per 24 hour   Intake 450 ml   Output 950 ml   Net -500 ml      Physical Exam   Constitutional: She is oriented to person, place, and time. She appears well-developed and well-nourished.   HENT:   Head: Normocephalic and atraumatic.   Right Ear: External ear normal.   Left Ear: External ear normal.   Mouth/Throat: Oropharynx is clear and moist.   Eyes: Pupils are equal, round, and reactive to light. Conjunctivae and EOM are normal.   Neck: Normal range of motion. Neck supple. No JVD present.   Cardiovascular:  Normal rate, regular rhythm, normal heart sounds and intact distal pulses.   No murmur heard.  +2 to 3 pitting edema to right lower extremity +1 to right lower extremity.   Pulmonary/Chest: Effort normal and breath sounds normal. She has no wheezes.   Abdominal: Soft. Bowel sounds are normal. There is no tenderness. There is no guarding.   Grossly protuberant   Musculoskeletal: Normal range of motion. She exhibits edema and tenderness.   Wound to right knee with wound VAC.  Dressing intact   Neurological: She is alert and oriented to person, place, and time. Coordination normal.   Skin: Skin is warm and dry.   Wound VAC in place to right knee.  Chronic skin changes bilaterally.   Psychiatric: She has a normal mood and affect. Her behavior is normal. Judgment normal.   Nursing note and vitals reviewed.      Significant Labs:   BMP:   Recent Labs   Lab 09/14/19  0434   GLU 95      K 4.0   CL 97   CO2 28   BUN 16   CREATININE 1.1   CALCIUM 9.4     CBC:   Recent Labs   Lab 09/12/19  1847   WBC 11.69   HGB 9.6*   HCT 31.3*   *       Significant Imaging:  No new imaging

## 2019-09-14 NOTE — PROGRESS NOTES
Iris Glasgow Ami [id number] is a 63 y.o. female who had been consulted for vancomycin dosing.    Vancomycin has been discontinued.  Pharmacy consult for vancomycin dosing in no longer required.    Thank you for allowing us to participate in this patient's care.     Jen Lockwood, PharmD     Hemod[facility] 3 times a week from [facility] a week from [facility]

## 2019-09-14 NOTE — PLAN OF CARE
Problem: Adult Inpatient Plan of Care  Goal: Patient-Specific Goal (Individualization)  Outcome: Ongoing (interventions implemented as appropriate)  Pt is  AAOX4. POC reviewed with pt. Pt verbalized understanding. VSS. Remains afebrile. IV abx discontinued, Pt on oral abx now per cx sensitivity. PICC line has no blood return, alteplace placed in line, no blood return after placement. Secure chat sent to Carolyne Hale NP for a second dose, no response.  Pain controlled with PRN medications. Remains free of injury. Bed low, breaks locked, call light in reach. Will monitor.

## 2019-09-15 LAB
AORTIC ROOT ANNULUS: 2.38 CM
AORTIC VALVE CUSP SEPERATION: 1.48 CM
AV INDEX (PROSTH): 0.83
AV MEAN GRADIENT: 10 MMHG
AV PEAK GRADIENT: 19 MMHG
AV VALVE AREA: 2.54 CM2
AV VELOCITY RATIO: 0.63
BSA FOR ECHO PROCEDURE: 2.36 M2
CV ECHO LV RWT: 0.66 CM
DOP CALC AO PEAK VEL: 2.16 M/S
DOP CALC AO VTI: 43.1 CM
DOP CALC LVOT AREA: 3 CM2
DOP CALC LVOT DIAMETER: 1.97 CM
DOP CALC LVOT PEAK VEL: 1.36 M/S
DOP CALC LVOT STROKE VOLUME: 109.49 CM3
DOP CALCLVOT PEAK VEL VTI: 35.94 CM
E WAVE DECELERATION TIME: 157 MSEC
E/A RATIO: 1.17
E/E' RATIO: 11.79 M/S
ECHO LV POSTERIOR WALL: 1.3 CM (ref 0.6–1.1)
FRACTIONAL SHORTENING: 25 % (ref 28–44)
INTERVENTRICULAR SEPTUM: 1.31 CM (ref 0.6–1.1)
IVRT: 0.09 MSEC
LEFT ATRIUM SIZE: 3.75 CM
LEFT INTERNAL DIMENSION IN SYSTOLE: 2.95 CM (ref 2.1–4)
LEFT VENTRICLE MASS INDEX: 83 G/M2
LEFT VENTRICULAR INTERNAL DIMENSION IN DIASTOLE: 3.93 CM (ref 3.5–6)
LEFT VENTRICULAR MASS: 182.83 G
LV LATERAL E/E' RATIO: 11.2 M/S
LV SEPTAL E/E' RATIO: 12.44 M/S
MV A" WAVE DURATION": 217 MSEC
MV PEAK A VEL: 0.96 M/S
MV PEAK E VEL: 1.12 M/S
PISA TR MAX VEL: 2.08 M/S
PULM VEIN A" WAVE DURATION": 97 MSEC
PULM VEIN S/D RATIO: 1.58
PV PEAK D VEL: 0.4 M/S
PV PEAK S VEL: 0.63 M/S
PV PEAK VELOCITY: 0.74 CM/S
RA PRESSURE: 15 MMHG
RIGHT VENTRICULAR END-DIASTOLIC DIMENSION: 2.91 CM
TDI LATERAL: 0.1 M/S
TDI SEPTAL: 0.09 M/S
TDI: 0.1 M/S
TR MAX PG: 17 MMHG
TRICUSPID ANNULAR PLANE SYSTOLIC EXCURSION: 3.24 CM
TV REST PULMONARY ARTERY PRESSURE: 32 MMHG

## 2019-09-15 PROCEDURE — 94761 N-INVAS EAR/PLS OXIMETRY MLT: CPT

## 2019-09-15 PROCEDURE — 25000003 PHARM REV CODE 250: Performed by: INTERNAL MEDICINE

## 2019-09-15 PROCEDURE — 63600175 PHARM REV CODE 636 W HCPCS: Performed by: INTERNAL MEDICINE

## 2019-09-15 PROCEDURE — 25000003 PHARM REV CODE 250: Performed by: ORTHOPAEDIC SURGERY

## 2019-09-15 PROCEDURE — 97116 GAIT TRAINING THERAPY: CPT

## 2019-09-15 PROCEDURE — 63600175 PHARM REV CODE 636 W HCPCS: Performed by: NURSE PRACTITIONER

## 2019-09-15 PROCEDURE — 12000002 HC ACUTE/MED SURGE SEMI-PRIVATE ROOM

## 2019-09-15 PROCEDURE — 25000003 PHARM REV CODE 250: Performed by: NURSE PRACTITIONER

## 2019-09-15 PROCEDURE — 97607 NEG PRS WND THR NDME<=50SQCM: CPT

## 2019-09-15 PROCEDURE — 99900035 HC TECH TIME PER 15 MIN (STAT)

## 2019-09-15 RX ADMIN — MORPHINE SULFATE 2 MG: 2 INJECTION, SOLUTION INTRAMUSCULAR; INTRAVENOUS at 12:09

## 2019-09-15 RX ADMIN — BUPROPION HYDROCHLORIDE 75 MG: 75 TABLET, FILM COATED ORAL at 09:09

## 2019-09-15 RX ADMIN — LEVOFLOXACIN 500 MG: 500 TABLET, FILM COATED ORAL at 09:09

## 2019-09-15 RX ADMIN — MICONAZOLE NITRATE: 20 CREAM TOPICAL at 09:09

## 2019-09-15 RX ADMIN — FLUOXETINE 40 MG: 20 CAPSULE ORAL at 09:09

## 2019-09-15 RX ADMIN — PANTOPRAZOLE SODIUM 40 MG: 40 TABLET, DELAYED RELEASE ORAL at 09:09

## 2019-09-15 RX ADMIN — LOVASTATIN 20 MG: 20 TABLET ORAL at 09:09

## 2019-09-15 RX ADMIN — MORPHINE SULFATE 2 MG: 2 INJECTION, SOLUTION INTRAMUSCULAR; INTRAVENOUS at 06:09

## 2019-09-15 RX ADMIN — FUROSEMIDE 40 MG: 10 INJECTION, SOLUTION INTRAMUSCULAR; INTRAVENOUS at 09:09

## 2019-09-15 RX ADMIN — TRAZODONE HYDROCHLORIDE 50 MG: 50 TABLET ORAL at 09:09

## 2019-09-15 RX ADMIN — HYDROCODONE BITARTRATE AND ACETAMINOPHEN 1 TABLET: 10; 325 TABLET ORAL at 01:09

## 2019-09-15 RX ADMIN — HYDROCODONE BITARTRATE AND ACETAMINOPHEN 1 TABLET: 10; 325 TABLET ORAL at 09:09

## 2019-09-15 RX ADMIN — MORPHINE SULFATE 2 MG: 2 INJECTION, SOLUTION INTRAMUSCULAR; INTRAVENOUS at 09:09

## 2019-09-15 RX ADMIN — GABAPENTIN 100 MG: 100 CAPSULE ORAL at 09:09

## 2019-09-15 RX ADMIN — CEFTRIAXONE 2 G: 2 INJECTION, SOLUTION INTRAVENOUS at 09:09

## 2019-09-15 RX ADMIN — FERROUS SULFATE TAB EC 325 MG (65 MG FE EQUIVALENT) 325 MG: 325 (65 FE) TABLET DELAYED RESPONSE at 08:09

## 2019-09-15 RX ADMIN — FLUCONAZOLE 200 MG: 100 TABLET ORAL at 09:09

## 2019-09-15 RX ADMIN — IRON SUCROSE 200 MG: 20 INJECTION, SOLUTION INTRAVENOUS at 11:09

## 2019-09-15 RX ADMIN — GABAPENTIN 100 MG: 100 CAPSULE ORAL at 03:09

## 2019-09-15 NOTE — PLAN OF CARE
09/14/19 2005   Patient Assessment/Suction   Level of Consciousness (AVPU) alert   Respiratory Effort Normal;Unlabored   Rhythm/Pattern, Respiratory pattern regular   PRE-TX-O2   O2 Device (Oxygen Therapy) room air   SpO2 97 %   Pulse Oximetry Type Intermittent   Aerosol Therapy   $ Aerosol Therapy Charges PRN treatment not required

## 2019-09-15 NOTE — PLAN OF CARE
Problem: Physical Therapy Goal  Goal: Physical Therapy Goal  Goals to be met by: 2019     Patient will increase functional independence with mobility by performin. Supine to sit with Modified [place]  2. Sit to supine with Modified [place]  3. Sit to stand transfer with Contact Guard Assistance  4. Bed to chair transfer with Contact Guard Assistance using Rolling Walker  5. Gait  x 250 feet with Contact Guard Assistance using Rolling Walker.   6. Lower extremity exercise program x10-20 reps per handout, with independence     Outcome: Ongoing (interventions implemented as appropriate)  Patient sitting in bedside chair upon arrival, ambulated 350' with bariatric RW with SBA and PTA carrying wound vac.  Sitting TE x 10.    Comments: Patient sitting in bedside chair upon arrival, ambulated 350' with bariatric RW with SBA and PTA carrying wound vac.  Sitting TE x 10.

## 2019-09-15 NOTE — PROGRESS NOTES
Patient unavailable  Unsure when wound vac will be changed. Would like photo.  Cultures in progress  following

## 2019-09-15 NOTE — SUBJECTIVE & OBJECTIVE
Interval History:  No new issues overnight.  She ambulated in martins using walker with PT this morning.  She reports improvement of peripheral edema with IV Lasix schedule.  She is feeling better this morning.  Pain control with medications.    Discussed with Dr. To.  Wound cultures are pending.  Pending cultures patient may be able to DC home with oral antibiotics with outpatient PT OT and wound care.  Awaiting Dr. Chung recommendations.    Review of Systems   Constitutional: Positive for activity change and fatigue. Negative for diaphoresis and fever.   Respiratory: Positive for shortness of breath (VALERIO). Negative for cough.    Cardiovascular: Positive for leg swelling. Negative for chest pain.   Gastrointestinal: Negative for abdominal distention, abdominal pain and constipation.   Genitourinary: Negative for dysuria, flank pain and hematuria.   Musculoskeletal: Positive for arthralgias and gait problem.   Skin: Positive for wound (right knee). Negative for rash.   Neurological: Negative for speech difficulty and numbness.   Psychiatric/Behavioral: Negative for agitation and confusion.     Objective:     Vital Signs (Most Recent):  Temp: 98 °F (36.7 °C) (09/15/19 1141)  Pulse: 76 (09/15/19 1141)  Resp: 20 (09/15/19 1141)  BP: (!) 108/55 (09/15/19 1141)  SpO2: 96 % (09/15/19 1141) Vital Signs (24h Range):  Temp:  [96.8 °F (36 °C)-98 °F (36.7 °C)] 98 °F (36.7 °C)  Pulse:  [66-76] 76  Resp:  [14-20] 20  SpO2:  [95 %-97 %] 96 %  BP: ()/(52-69) 108/55     Weight: 127 kg (280 lb)  Body mass index is 51.21 kg/m².    Intake/Output Summary (Last 24 hours) at 9/15/2019 1440  Last data filed at 9/15/2019 0300  Gross per 24 hour   Intake --   Output 1950 ml   Net -1950 ml      Physical Exam   Constitutional: She is oriented to person, place, and time. She appears well-developed and well-nourished.   HENT:   Head: Normocephalic and atraumatic.   Right Ear: External ear normal.   Left Ear: External ear normal.    Mouth/Throat: Oropharynx is clear and moist.   Eyes: Pupils are equal, round, and reactive to light. Conjunctivae and EOM are normal.   Neck: Normal range of motion. Neck supple. No JVD present.   Cardiovascular: Normal rate, regular rhythm, normal heart sounds and intact distal pulses.   No murmur heard.  +2  pitting edema to right lower extremity +1 to right lower extremity.   Pulmonary/Chest: Effort normal and breath sounds normal. She has no wheezes.   Abdominal: Soft. Bowel sounds are normal. There is no tenderness. There is no guarding.   Grossly protuberant   Musculoskeletal: Normal range of motion. She exhibits edema and tenderness.   Wound to right knee with wound VAC.  Dressing intact   Neurological: She is alert and oriented to person, place, and time. Coordination normal.   Skin: Skin is warm and dry.   Wound VAC in place to right knee.  Chronic skin changes bilaterally.   Psychiatric: She has a normal mood and affect. Her behavior is normal. Judgment normal.   Nursing note and vitals reviewed.      Significant Labs:   BMP:   Recent Labs   Lab 09/14/19  0434   GLU 95      K 4.0   CL 97   CO2 28   BUN 16   CREATININE 1.1   CALCIUM 9.4     CBC:   No results for input(s): WBC, HGB, HCT, PLT in the last 48 hours.    Significant Imaging:  No new imaging

## 2019-09-15 NOTE — PT/OT/SLP PROGRESS
"Physical Therapy Treatment    Patient Name:  Iris Mueller   MRN:  [id number]    Recommendations:     Discharge Recommendations:  home with home health, nursing facility, skilled   Discharge Equipment Recommendations: none   Barriers to discharge: None    Assessment:     Iris Mueller is a 63 y.o. female admitted with a medical diagnosis of Infection of right knee.  She presents with the following impairments/functional limitations:  impaired functional mobilty, decreased ROM, impaired joint extensibility, orthopedic precautions, gait instability, impaired endurance, decreased lower extremity function. Patient sitting in bedside chair upon arrival, ambulated 350' with bariatric RW with SBA and PTA carrying wound vac.  Sitting TE x 10.    Rehab Prognosis: Good; patient would benefit from acute skilled PT services to address these deficits and reach maximum level of function.    Recent Surgery: Procedure(s) (LRB):  INCISION AND DRAINAGE, LOWER EXTREMITY (Right)  REPLACEMENT, WOUND VAC (Right) 3 Days Post-Op    Plan:     During this hospitalization, patient to be seen daily to address the identified rehab impairments via gait training, therapeutic activities, therapeutic exercises and progress toward the following goals:    · Plan of Care Expires:  09/26/19    Subjective     Chief Complaint: "I am sore in my knees."  Patient/Family Comments/goals: "I just want to walk normal again, and I will, it's just not going to be today."  Pain/Comfort:  · Pain Rating 1: 7/10  · Location - Side 1: Right  · Location 1: knee  · Pain Addressed 1: Distraction, Pre-medicate for activity      Objective:     Communicated with Nurse Sandoval and patient prior to session.  Patient found up in chair with wound vac upon PT entry to room.     General Precautions: Standard, fall   Orthopedic Precautions:RLE weight bearing as tolerated   Braces:       Functional Mobility:  · Bed Mobility:  Supine to Sit: supervision  · Transfers: "  Sit to Stand:  supervision with rolling walker  · Gait: 300ft with RW and PTA carrying wound vac, no balance deficits, VC's for posture and technique with RW  · Balance: Good in sitting/standing, static/dynamic      AM-PAC 6 CLICK MOBILITY  Turning over in bed (including adjusting bedclothes, sheets and blankets)?: 4  Sitting down on and standing up from a chair with arms (e.g., wheelchair, bedside commode, etc.): 4  Moving from lying on back to sitting on the side of the bed?: 4  Moving to and from a bed to a chair (including a wheelchair)?: 4  Need to walk in hospital room?: 4  Climbing 3-5 steps with a railing?: 3  Basic Mobility Total Score: 23       Therapeutic Activities and Exercises:   Sitting LAQ (within range), marching, and AP B LE's x 10    Patient left up in chair with call button in reach and nursing notified..    GOALS:   Multidisciplinary Problems     Physical Therapy Goals        Problem: Physical Therapy Goal    Goal Priority Disciplines Outcome Goal Variances Interventions   Physical Therapy Goal     PT, PT/OT Ongoing (interventions implemented as appropriate)     Description:  Goals to be met by: 2019     Patient will increase functional independence with mobility by performin. Supine to sit with Modified [place]  2. Sit to supine with Modified [place]  3. Sit to stand transfer with Contact Guard Assistance  4. Bed to chair transfer with Contact Guard Assistance using Rolling Walker  5. Gait  x 250 feet with Contact Guard Assistance using Rolling Walker.   6. Lower extremity exercise program x10-20 reps per handout, with independence                      Time Tracking:     PT Received On: 09/15/19  PT Start Time: 1000     PT Stop Time: 1025  PT Total Time (min): 25 min     Billable Minutes: Gait Training 15 and Therapeutic Activity 10    Treatment Type: Treatment  PT/PTA: PTA     PTA Visit Number: 2     Carolyne Payne, PTA  09/15/2019

## 2019-09-15 NOTE — PLAN OF CARE
Problem: Adult Inpatient Plan of Care  Goal: Plan of Care Review  Outcome: Ongoing (interventions implemented as appropriate)  Patient AAO X 4. Patient free from injury during shift. Patient has wound vac in place. Draining serosanguineous drainage. Dressing is clean , dry, intact. Pain managed pharmacologically. Provided full relief. Patient free from N/V during shift. PICC access has IV ABX running. Patient on O2 3 liters nasal canula. Tolerating well. Purewick in place, draining clear yellow urine.  Remained afebrile during shift. Restroom offered. Repositioned as needed. Safety maintained. Bed in lowest position, call light and personal items within reach. Patient verbalized understanding of care. Will continue to monitor with every 2 hour rounding.

## 2019-09-15 NOTE — PROGRESS NOTES
[facility] Hospital Medicine  Progress Note    Patient Name: Iris Mueller  MRN: [id number]  Patient Class: IP- Inpatient   Admission Date: 9/12/2019  Length of Stay: 3 days  Attending Physician: Paige Wylie MD  Primary Care Provider: Elkin You MD        Subjective:     Principal Problem:Infection of right knee        HPI:  The patient is a 63-year-old woman who underwent a right total knee arthroplasty August 14th of this year.  She was admitted August 30th with infection in the right knee and had an I and D done.  She was placed on Rocephin and discharged to a skilled nursing facility.  Dr. Chung took her back to the OR today for another I and D and placement of a wound VAC.  The wound was not healing properly and the sutures had become undone.      The patient was otherwise doing well at the skilled nursing facility.  She has not had any fever or chills.  She has been walking with assistance.  She has not had any falls.    Overview/Hospital Course:  No notes on file    Interval History:  No new issues overnight.  She ambulated in martins using walker with PT this morning.  She reports improvement of peripheral edema with IV Lasix schedule.  She is feeling better this morning.  Pain control with medications.    Discussed with Dr. To.  Wound cultures are pending.  Pending cultures patient may be able to DC home with oral antibiotics with outpatient PT OT and wound care.  Awaiting Dr. Chung recommendations.    Review of Systems   Constitutional: Positive for activity change and fatigue. Negative for diaphoresis and fever.   Respiratory: Positive for shortness of breath (VALERIO). Negative for cough.    Cardiovascular: Positive for leg swelling. Negative for chest pain.   Gastrointestinal: Negative for abdominal distention, abdominal pain and constipation.   Genitourinary: Negative for dysuria, flank pain and hematuria.   Musculoskeletal: Positive for arthralgias and gait  problem.   Skin: Positive for wound (right knee). Negative for rash.   Neurological: Negative for speech difficulty and numbness.   Psychiatric/Behavioral: Negative for agitation and confusion.     Objective:     Vital Signs (Most Recent):  Temp: 98 °F (36.7 °C) (09/15/19 1141)  Pulse: 76 (09/15/19 1141)  Resp: 20 (09/15/19 1141)  BP: (!) 108/55 (09/15/19 1141)  SpO2: 96 % (09/15/19 1141) Vital Signs (24h Range):  Temp:  [96.8 °F (36 °C)-98 °F (36.7 °C)] 98 °F (36.7 °C)  Pulse:  [66-76] 76  Resp:  [14-20] 20  SpO2:  [95 %-97 %] 96 %  BP: ()/(52-69) 108/55     Weight: 127 kg (280 lb)  Body mass index is 51.21 kg/m².    Intake/Output Summary (Last 24 hours) at 9/15/2019 1440  Last data filed at 9/15/2019 0300  Gross per 24 hour   Intake --   Output 1950 ml   Net -1950 ml      Physical Exam   Constitutional: She is oriented to person, place, and time. She appears well-developed and well-nourished.   HENT:   Head: Normocephalic and atraumatic.   Right Ear: External ear normal.   Left Ear: External ear normal.   Mouth/Throat: Oropharynx is clear and moist.   Eyes: Pupils are equal, round, and reactive to light. Conjunctivae and EOM are normal.   Neck: Normal range of motion. Neck supple. No JVD present.   Cardiovascular: Normal rate, regular rhythm, normal heart sounds and intact distal pulses.   No murmur heard.  +2  pitting edema to right lower extremity +1 to right lower extremity.   Pulmonary/Chest: Effort normal and breath sounds normal. She has no wheezes.   Abdominal: Soft. Bowel sounds are normal. There is no tenderness. There is no guarding.   Grossly protuberant   Musculoskeletal: Normal range of motion. She exhibits edema and tenderness.   Wound to right knee with wound VAC.  Dressing intact   Neurological: She is alert and oriented to person, place, and time. Coordination normal.   Skin: Skin is warm and dry.   Wound VAC in place to right knee.  Chronic skin changes bilaterally.   Psychiatric: She has a  normal mood and affect. Her behavior is normal. Judgment normal.   Nursing note and vitals reviewed.      Significant Labs:   BMP:   Recent Labs   Lab 09/14/19  0434   GLU 95      K 4.0   CL 97   CO2 28   BUN 16   CREATININE 1.1   CALCIUM 9.4     CBC:   No results for input(s): WBC, HGB, HCT, PLT in the last 48 hours.    Significant Imaging:  No new imaging      Assessment/Plan:      * Infection of right knee  The patient had another I and D of the right knee on 09/12/2019 and a wound VAC was placed.  Dr. To saw the patient today and will continue IV antibiotics.  She is currently on vancomycin and Ancef IV.  Wound care consulted    Acute diastolic heart failure  Possible. Add lasix IV 40 mg daily. Monitor I/O. Check echo to evaluate LV function.      Hyperglycemia  Check hemoglobin A1c.  Add Accu-Cheks and insulin as needed.      Hyponatremia  Slightly decreased.  Monitor BMP.      Chronic kidney disease, stage III (moderate)  Chronic.  At baseline renal function.  Continue to monitor renal function      Surgical wound dehiscence, subsequent encounter  Wound Care consulted.  Wound VAC in place      Thrombocytosis  This is probably reactive thrombocytosis      Normocytic anemia  The patient has been anemic for several months and her hemoglobin is at baseline  Check iron studies-Fe sat 5%. Add venofer x 1 dose and po fergon  Add additional Venofer    Obesity, Class III, BMI 40-49.9 (morbid obesity)  Body mass index is 51.21 kg/m².  General weight loss/lifestyle modification strategies discussed (elicit support from others; identify saboteurs; non-food rewards, etc).        Essential hypertension  Her blood pressure is on the low side right now therefore I will hold her antihypertensives      Mild asthma without complication  Stable.  No evidence of exacerbation.         VTE Risk Mitigation (From admission, onward)        Ordered     IP VTE HIGH RISK PATIENT  Once      09/12/19 7049     Place sequential  compression device  Until discontinued      09/12/19 1719     Place KAROLINA hose  Until discontinued      09/12/19 1719                Carolyne Hale NP  Department of Hospital Medicine   [facility]

## 2019-09-15 NOTE — CARE UPDATE
09/15/19 0736   Patient Assessment/Suction   Level of Consciousness (AVPU) alert   Respiratory Effort Normal;Unlabored   PRE-TX-O2   O2 Device (Oxygen Therapy) room air   SpO2 95 %   Pulse Oximetry Type Intermittent   $ Pulse Oximetry - Multiple Charge Pulse Oximetry - Multiple   Pulse 66   Resp 18   Aerosol Therapy   $ Aerosol Therapy Charges PRN treatment not required   Respiratory Treatment Status (SVN) PRN treatment not required

## 2019-09-15 NOTE — PLAN OF CARE
Problem: Adult Inpatient Plan of Care  Goal: Patient-Specific Goal (Individualization)  Outcome: Ongoing (interventions implemented as appropriate)  Pt is  AAOX4. POC reviewed with pt. Pt verbalized understanding. VSS. Remains afebrile. IV abx infused per order. Pain controlled with PRN medications. Pt working well with PT and requests to discharge home with home health as opposed to SNF. Remains free of injury. Bed low, breaks locked, call light in reach. Will monitor.

## 2019-09-15 NOTE — HOSPITAL COURSE
Patient monitored closely during hospitalization.  Patient underwent I&D of her right knee with wound cultures obtained per Dr. Chung on 09/12/2019.  Wound VAC was placed during surgery.  She was initiated on IV antibiotics.  ID consulted for IV antibiotic management. She is able to ambulate in martins using walker.  Wound cultures grew Proteus mirabilis and Stephotrophomonas species.  Wound care continued.  Later patient was accepted at [facility]-term acute care facility.  Patient is expected to follow up with Dr. Collins with Plastic surgery at [facility] soon.  Patient is being discharged to LTAC with following discharge plan of care.

## 2019-09-15 NOTE — ASSESSMENT & PLAN NOTE
The patient has been anemic for several months and her hemoglobin is at baseline  Check iron studies-Fe sat 5%. Add venofer x 1 dose and po fergon  Add additional Venofer

## 2019-09-16 ENCOUNTER — OUTSIDE PLACE OF SERVICE (OUTPATIENT)
Dept: INFECTIOUS DISEASES | Facility: CLINIC | Age: 64
End: 2019-09-16
Payer: MEDICARE

## 2019-09-16 DIAGNOSIS — N18.30 CHRONIC KIDNEY DISEASE, STAGE III (MODERATE): ICD-10-CM

## 2019-09-16 DIAGNOSIS — A41.9 SEVERE SEPSIS: ICD-10-CM

## 2019-09-16 DIAGNOSIS — M17.11 OSTEOARTHRITIS OF RIGHT KNEE: ICD-10-CM

## 2019-09-16 DIAGNOSIS — T81.31XD SURGICAL WOUND DEHISCENCE, SUBSEQUENT ENCOUNTER: ICD-10-CM

## 2019-09-16 DIAGNOSIS — R65.20 SEVERE SEPSIS: ICD-10-CM

## 2019-09-16 DIAGNOSIS — D75.839 THROMBOCYTOSIS: ICD-10-CM

## 2019-09-16 LAB
ALBUMIN SERPL BCP-MCNC: 2.7 G/DL (ref 3.5–5.2)
ANION GAP SERPL CALC-SCNC: 10 MMOL/L (ref 8–16)
BACTERIA SPEC AEROBE CULT: ABNORMAL
BACTERIA SPEC AEROBE CULT: ABNORMAL
BASOPHILS # BLD AUTO: 0.06 K/UL (ref 0–0.2)
BASOPHILS NFR BLD: 0.7 % (ref 0–1.9)
BUN SERPL-MCNC: 15 MG/DL (ref 8–23)
CALCIUM SERPL-MCNC: 8.7 MG/DL (ref 8.7–10.5)
CHLORIDE SERPL-SCNC: 98 MMOL/L (ref 95–110)
CO2 SERPL-SCNC: 27 MMOL/L (ref 23–29)
CREAT SERPL-MCNC: 1.2 MG/DL (ref 0.5–1.4)
DIFFERENTIAL METHOD: ABNORMAL
EOSINOPHIL # BLD AUTO: 0.3 K/UL (ref 0–0.5)
EOSINOPHIL NFR BLD: 3.5 % (ref 0–8)
ERYTHROCYTE [DISTWIDTH] IN BLOOD BY AUTOMATED COUNT: 15.6 % (ref 11.5–14.5)
EST. GFR  (AFRICAN AMERICAN): 55 ML/MIN/1.73 M^2
EST. GFR  (NON AFRICAN AMERICAN): 48 ML/MIN/1.73 M^2
GLUCOSE SERPL-MCNC: 97 MG/DL (ref 70–110)
HCT VFR BLD AUTO: 29.1 % (ref 37–48.5)
HGB BLD-MCNC: 8.9 G/DL (ref 12–16)
IMM GRANULOCYTES # BLD AUTO: 0.08 K/UL (ref 0–0.04)
LYMPHOCYTES # BLD AUTO: 1.6 K/UL (ref 1–4.8)
LYMPHOCYTES NFR BLD: 18.2 % (ref 18–48)
MCH RBC QN AUTO: 29.6 PG (ref 27–31)
MCHC RBC AUTO-ENTMCNC: 30.6 G/DL (ref 32–36)
MCV RBC AUTO: 97 FL (ref 82–98)
MONOCYTES # BLD AUTO: 0.7 K/UL (ref 0.3–1)
MONOCYTES NFR BLD: 8.1 % (ref 4–15)
NEUTROPHILS # BLD AUTO: 6 K/UL (ref 1.8–7.7)
NEUTROPHILS NFR BLD: 68.6 % (ref 38–73)
NRBC BLD-RTO: 0 /100 WBC
PLATELET # BLD AUTO: 479 K/UL (ref 150–350)
PMV BLD AUTO: 8.6 FL (ref 9.2–12.9)
POTASSIUM SERPL-SCNC: 3.6 MMOL/L (ref 3.5–5.1)
RBC # BLD AUTO: 3.01 M/UL (ref 4–5.4)
SODIUM SERPL-SCNC: 135 MMOL/L (ref 136–145)
WBC # BLD AUTO: 8.67 K/UL (ref 3.9–12.7)

## 2019-09-16 PROCEDURE — 99024 POSTOP FOLLOW-UP VISIT: CPT | Mod: ,,, | Performed by: ORTHOPAEDIC SURGERY

## 2019-09-16 PROCEDURE — 82040 ASSAY OF SERUM ALBUMIN: CPT

## 2019-09-16 PROCEDURE — 99024 PR POST-OP FOLLOW-UP VISIT: ICD-10-PCS | Mod: ,,, | Performed by: ORTHOPAEDIC SURGERY

## 2019-09-16 PROCEDURE — 25000003 PHARM REV CODE 250: Performed by: ORTHOPAEDIC SURGERY

## 2019-09-16 PROCEDURE — 63600175 PHARM REV CODE 636 W HCPCS: Performed by: INTERNAL MEDICINE

## 2019-09-16 PROCEDURE — 99231 SBSQ HOSP IP/OBS SF/LOW 25: CPT | Mod: S$GLB,,, | Performed by: INTERNAL MEDICINE

## 2019-09-16 PROCEDURE — 25000003 PHARM REV CODE 250: Performed by: NURSE PRACTITIONER

## 2019-09-16 PROCEDURE — 25000003 PHARM REV CODE 250: Performed by: INTERNAL MEDICINE

## 2019-09-16 PROCEDURE — 80048 BASIC METABOLIC PNL TOTAL CA: CPT

## 2019-09-16 PROCEDURE — 36415 COLL VENOUS BLD VENIPUNCTURE: CPT

## 2019-09-16 PROCEDURE — 99231 PR SUBSEQUENT HOSPITAL CARE,LEVL I: ICD-10-PCS | Mod: S$GLB,,, | Performed by: INTERNAL MEDICINE

## 2019-09-16 PROCEDURE — 94761 N-INVAS EAR/PLS OXIMETRY MLT: CPT

## 2019-09-16 PROCEDURE — 12000002 HC ACUTE/MED SURGE SEMI-PRIVATE ROOM

## 2019-09-16 PROCEDURE — 97116 GAIT TRAINING THERAPY: CPT

## 2019-09-16 PROCEDURE — 99900035 HC TECH TIME PER 15 MIN (STAT)

## 2019-09-16 PROCEDURE — 85025 COMPLETE CBC W/AUTO DIFF WBC: CPT

## 2019-09-16 RX ADMIN — FLUOXETINE 40 MG: 20 CAPSULE ORAL at 09:09

## 2019-09-16 RX ADMIN — PANTOPRAZOLE SODIUM 40 MG: 40 TABLET, DELAYED RELEASE ORAL at 09:09

## 2019-09-16 RX ADMIN — BUPROPION HYDROCHLORIDE 75 MG: 75 TABLET, FILM COATED ORAL at 08:09

## 2019-09-16 RX ADMIN — MORPHINE SULFATE 2 MG: 2 INJECTION, SOLUTION INTRAMUSCULAR; INTRAVENOUS at 10:09

## 2019-09-16 RX ADMIN — BUPROPION HYDROCHLORIDE 75 MG: 75 TABLET, FILM COATED ORAL at 09:09

## 2019-09-16 RX ADMIN — HYDROCODONE BITARTRATE AND ACETAMINOPHEN 1 TABLET: 10; 325 TABLET ORAL at 10:09

## 2019-09-16 RX ADMIN — GABAPENTIN 100 MG: 100 CAPSULE ORAL at 03:09

## 2019-09-16 RX ADMIN — LEVOFLOXACIN 500 MG: 500 TABLET, FILM COATED ORAL at 09:09

## 2019-09-16 RX ADMIN — LOVASTATIN 20 MG: 20 TABLET ORAL at 08:09

## 2019-09-16 RX ADMIN — HYDROCODONE BITARTRATE AND ACETAMINOPHEN 1 TABLET: 10; 325 TABLET ORAL at 08:09

## 2019-09-16 RX ADMIN — FLUCONAZOLE 200 MG: 100 TABLET ORAL at 08:09

## 2019-09-16 RX ADMIN — TRAZODONE HYDROCHLORIDE 50 MG: 50 TABLET ORAL at 08:09

## 2019-09-16 RX ADMIN — FERROUS SULFATE TAB EC 325 MG (65 MG FE EQUIVALENT) 325 MG: 325 (65 FE) TABLET DELAYED RESPONSE at 09:09

## 2019-09-16 RX ADMIN — HYDROCODONE BITARTRATE AND ACETAMINOPHEN 1 TABLET: 10; 325 TABLET ORAL at 01:09

## 2019-09-16 RX ADMIN — GABAPENTIN 100 MG: 100 CAPSULE ORAL at 08:09

## 2019-09-16 RX ADMIN — GABAPENTIN 100 MG: 100 CAPSULE ORAL at 09:09

## 2019-09-16 RX ADMIN — CEFTRIAXONE 2 G: 2 INJECTION, SOLUTION INTRAVENOUS at 08:09

## 2019-09-16 NOTE — ASSESSMENT & PLAN NOTE
Possible.  DC IV Lasix.  Start oral Lasix 20 mg daily. Monitor I/O.  2D echocardiogram results reviewed.

## 2019-09-16 NOTE — PHYSICIAN QUERY
"PT Name: Iris Mueller  MR #: [id number]    Physician Query Form - Asthma Clarification      CDS: Melissa Patten RN, CCDS         Contact information :ext [phone])  [email or web address]     This form is a permanent document in the medical record.    Query Date: September 16, 2019    By submitting this query, we are merely seeking further clarification of documentation. Please utilize your independent clinical judgment when addressing the question(s) below.    The Medical Record contains the following:     Indicators Supporting Clinical Findings Location in Medical Record   x Asthma or Reactive Airway Disease documented "Mild asthma without complication  Stable.  No evidence of exacerbation."  H&P 9/12/19    Acute/Chronic Illness      Radiology Findings      RR     Blood Gases     O2 sats      BiPAP/Intubation/Supplemental O2      SOB, Wheezing, Productive Cough, Use of Accessory Muscles, Respiratory Distress, Hypoxia, etc.     x Treatment VENTOLIN HFA 90 mcg/actuation inhaler  ANORO ELLIPTA 62.5-25 mcg/actuation DsDv  fluticasone (FLONASE) 50 mcg/actuation nasal spray Home meds per  H&P 9/12/19   x Other   "Negative for cough, chest tightness, shortness of breath and wheezing"    H&P 9/12/19   Provider, please specify diagnosis or diagnoses associated with above clinical findings.  Please further specify mild asthma without complication.    [   ] Mild intermittent asthma, uncomplicated   [   ] Mild persistent asthma, uncomplicated   [   ] Other asthma type (please specify): ___   [ x  ]  Clinically Undetermined       Please document in your progress notes daily for the duration of treatment until resolved and include in your discharge summary.                                                                                                 "

## 2019-09-16 NOTE — PHYSICIAN QUERY
"PT Name: Iris Mueller  MR #: [id number]    Physician Query Form - Nutrition Clarification     CDS: Melissa Patten RN, CCDS         Contact information :ext [phone])  [email or web address]       This form is a permanent document in the medical record.     Query Date: September 16, 2019    By submitting this query, we are merely seeking further clarification of documentation.. Please utilize your independent clinical judgment when addressing the question(s) below.    The Medical record contains the following:   Indicators  Supporting Clinical Findings Location in Medical Record    % of Estimated Energy Intake over a time frame from p.o., TF, or TPN      Weight Status over a time frame      Subcutaneous Fat and/or Muscle Loss     x Fluid Accumulation or Edema +2 to 3 pitting edema to right lower extremity +1 to right lower extremity.   PN 9/12/19    Reduced  Strength     x Wt / BMI / Usual Body Weight Weight: 127 kg (280 lb)  Body mass index is 51.21 kg/m². H&P   x Delayed Wound Healing / Failure to Thrive "Poor wound healing due to edema, smoking, etc" ID PN 9/13/19    Acute or Chronic Illness      Medication cyanocobalamin, vitamin B-12, (VITAMIN B-12) 50 mcg tablet  ferrous sulfate (FEOSOL) 325 mg (65 mg iron) Tab tablet Current Outpt Meds per H&P 9/12/19   x Treatment "General weight loss/lifestyle modification strategies discussed (elicit support from others; identify saboteurs; non-food rewards, etc)."   HM PN 9/13/19   x Other "morbid obesity"  "malnutrition"    "Morbid obesity with malnutrition.." Anesthesia pre procedure Evaluation 9/12/19    Physician query response 9/14/19     AND / JESSICA Clinical Characteristics (October 2011)  A minimum of two characteristics is recommended for diagnosing either moderate or severe malnutrition   Mild Malnutrition Moderate Malnutrition Severe Malnutrition   Energy Intake from p.o., TF or TPN. < 75% intake of estimated energy needs for less than 7 " days < 75% intake of estimated energy needs for greater than 7 days < 50% intake of estimated energy needs for > 5 days   Weight Loss 1-2% in 1 month  5% in 3 months  7.5% in 6 months  10% in 1 year 1-2 % in 1 week  5% in 1 month  7.5% in 3 months  10% in 6 months  20% in 1 year > 2% in 1 week  > 5% in 1 month  > 7.5% in 3 months  > 10% in 6 months  > 20% in 1 year   Physical Findings     None *Mild subcutaneous fat and/or muscle loss  *Mild fluid accumulation  *Stage II decubitus  *Surgical wound or non-healing wound *Mod/severe subcutaneous fat and/or muscle loss  *Mod/severe fluid accumulation  *Stage III or IV decubitus  *Non-healing surgical wound     Provider, please specify diagnosis or diagnoses associated with above clinical findings.  Please document degree of malnutrition.    [   ] Mild Protein-Calorie Malnutrition   [  x ] Moderate Protein-Calorie Malnutrition   [   ] Other:    [  ] Clinically Undetermined       Please document in your progress notes daily for the duration of treatment until resolved and include in your discharge summary.

## 2019-09-16 NOTE — PHYSICIAN QUERY
"PT Name: Iris Mueller  MR #: [id number]    Physician Query Form - Hematology Clarification      CDS: Melissa Patten RN, CCDS         Contact information :ext [phone])  [email or web address]       This form is a permanent document in the medical record.      Query Date: September 16, 2019    By submitting this query, we are merely seeking further clarification of documentation. Please utilize your independent clinical judgment when addressing the question(s) below.    The Medical record contains the following:   Indicators  Supporting Clinical Findings Location in Medical Record   x "Anemia" documented "Normocytic anemia  The patient has been anemic for several months and her hemoglobin is at baseline"    H&P 9/12/19   x H & H = H/H 9.4/30.8  H/H 8.9/29.1 Lab 9/12/19  Lab 9/16/19    BP =                     HR=      "GI bleeding" documented      Acute bleeding (Non GI site)      Transfusion(s)     x Treatment:  ferrous sulfate (FEOSOL) 325 mg (65 mg iron) Tab tablet       cyanocobalamin, vitamin B-12, (VITAMIN B-12) 50 mcg tablet     "Add venofer x 1 dose and po fergon"   Home meds per  H&P 9/12/19          HM PN 9/14/19   x Other:  "underwent a right total knee arthroplasty August 14th of this year.  She was admitted August 30th with infection in the right knee and had an I and D done.  She was placed on Rocephin and discharged to a skilled nursing facility.  Dr. Chung took her back to the OR today for another I and D and placement of a wound VAC"    "Check iron studies-Fe sat 5%."    H&P 9/12/19                      Hosp Medicine PN 9/14/19     Provider, please specify diagnosis or diagnoses associated with above clinical findings.    [ x ] Iron deficiency anemia   [  ] Chronic blood loss anemia     [  ] Anemia of chronic disease ( Specify chronic disease) , ________     [  ] Other Hematological Diagnosis (please specify):     [  ] Clinically Undetermined       Please document in your " progress notes daily for the duration of treatment, until resolved, and include in your discharge summary.

## 2019-09-16 NOTE — CONSULTS
Has wound vac to infected post op right knee. Discussed plan of care with patient and family.  Recommend SHORT knee immobilizer to right leg to prevent knee bending.  Also recommend short KAROLINA stocking to LLE

## 2019-09-16 NOTE — PT/OT/SLP PROGRESS
Physical Therapy Treatment    Patient Name:  Iris Mueller   MRN:  [id number]    Recommendations:     Discharge Recommendations:  home with home health, nursing facility, skilled   Discharge Equipment Recommendations: none   Barriers to discharge: None    Assessment:     Iris Mueller is a 63 y.o. female admitted with a medical diagnosis of Infection of right knee.  She presents with the following impairments/functional limitations:  weakness, gait instability, impaired endurance, pain . Pt agreeable to therapy at this time and reports she needs to use the restroom. Pt ambulated to restroom with + void and mod I with personal hygiene. Pt continued gait training of 250' geriatric walker SBA and PTA carrying wound vac with VC to stand erect and stay inside RW.     Rehab Prognosis: Good; patient would benefit from acute skilled PT services to address these deficits and reach maximum level of function.    Recent Surgery: Procedure(s) (LRB):  INCISION AND DRAINAGE, LOWER EXTREMITY (Right)  REPLACEMENT, WOUND VAC (Right) 4 Days Post-Op    Plan:     During this hospitalization, patient to be seen daily to address the identified rehab impairments via gait training, therapeutic activities, therapeutic exercises and progress toward the following goals:    · Plan of Care Expires:  09/26/19    Subjective     Chief Complaint: Pain after wound cleaned last night.   Patient/Family Comments/goals: To go home.   Pain/Comfort:  · Pain Rating 1: 5/10  · Location - Side 1: Right  · Location 1: knee  · Pain Addressed 1: Pre-medicate for activity      Objective:     Communicated with nurse Darian  prior to session.  Patient found HOB elevated with wound vac, PureWick upon PT entry to room.     General Precautions: Standard, fall   Orthopedic Precautions:RLE weight bearing as tolerated   Braces: N/A     Functional Mobility:  · Bed Mobility:     · Rolling Right: stand by assistance  · Scooting: stand by  assistance  · Supine to Sit: stand by assistance  · Transfers:     · Sit to Stand:  stand by assistance with rolling walker  · Toilet Transfer: stand by assistance with  rolling walker  using  Stand Pivot  · Gait: 10' RW SBA to restroom with + void and continued 250' RW SBA with wound vac carried by PTA      AM-PAC 6 CLICK MOBILITY          Therapeutic Activities and Exercises:  VC to stand erect and stay inside RW to improve posture and breathing.  Bed mobility> SBA  Sit<>stand SBA         Patient left up in chair with all lines intact, call button in reach and nurse Darian notified..    GOALS:   Multidisciplinary Problems     Physical Therapy Goals        Problem: Physical Therapy Goal    Goal Priority Disciplines Outcome Goal Variances Interventions   Physical Therapy Goal     PT, PT/OT Ongoing (interventions implemented as appropriate)     Description:  Goals to be met by: 2019     Patient will increase functional independence with mobility by performin. Supine to sit with Modified [place]  2. Sit to supine with Modified [place]  3. Sit to stand transfer with Contact Guard Assistance  4. Bed to chair transfer with Contact Guard Assistance using Rolling Walker  5. Gait  x 250 feet with Contact Guard Assistance using Rolling Walker.   6. Lower extremity exercise program x10-20 reps per handout, with independence                      Time Tracking:     PT Received On: 19  PT Start Time: 0958     PT Stop Time: 1020  PT Total Time (min): 22 min     Billable Minutes: Gait Training 22 minutes    Treatment Type: Treatment  PT/PTA: PTA     PTA Visit Number: 3     Remajeet Sewelldora, PTA  2019

## 2019-09-16 NOTE — PLAN OF CARE
Problem: Adult Inpatient Plan of Care  Goal: Plan of Care Review  Patient awake, alert, and oriented x 4. Patient currently free from injury. Patient receiving antibiotic therapy via IV for infection in right knee. Patient's pain has been under control today. Patient ambulated in the martins with physical therapy x 2 times today. Patient's BP running low. Patient has no signs or symptoms of being hypotensive. Other vitals have been stable. Wound vac to right leg patent with scant amount of discharge. Dressing is clean, dry and intact.

## 2019-09-16 NOTE — PROGRESS NOTES
[facility] Hospital Medicine  Progress Note    Patient Name: Iris Mueller  MRN: [id number]  Patient Class: IP- Inpatient   Admission Date: 2019  Length of Stay: 4 days  Attending Physician: Yazmin Jules MD  Primary Care Provider: Elkin You MD        Subjective:     Principal Problem:Infection of right knee        HPI:  The patient is a 63-year-old woman who underwent a right total knee arthroplasty  of this year.  She was admitted  with infection in the right knee and had an I and D done.  She was placed on Rocephin and discharged to a skilled nursing facility.  Dr. Chung took her back to the OR today for another I and D and placement of a wound VAC.  The wound was not healing properly and the sutures had become undone.      The patient was otherwise doing well at the skilled nursing facility.  She has not had any fever or chills.  She has been walking with assistance.  She has not had any falls.    Overview/Hospital Course:  Patient monitor closely during hospitalization.  Patient underwent I&D of her right knee with wound cultures obtained per Dr. Chung on 2019.  Wound VAC was placed during surgery.  She was initiated on IV antibiotics.  Id consulted for IV antibiotic management.  She was noted to have significant peripheral edema and was initiated on Lasix IV 40 mg daily with significant improvement of edema. PT consulted.  She is able to ambulate in martins using walker.    Past Medical History:   Diagnosis Date    Arthritis     Asthma     Encounter for blood transfusion     Hypertension        Past Surgical History:   Procedure Laterality Date    ARTHROPLASTY, KNEE Right 2019    [facility] OR     SECTION      INCISION AND DRAINAGE, LOWER EXTREMITY Right 2019    Performed by [facility] at [facility] OR    INCISION AND DRAINAGE, LOWER EXTREMITY Right 2019    Performed by Delio  FEDERICO Chung MD at [facility] OR    REPLACEMENT, WOUND VAC Right 9/12/2019    Performed by [facility]io [facility]ung MD at [facility] OR    TONSILLECTOMY         Review of patient's allergies indicates:  No Known Allergies    No current facility-administered medications on file prior to encounter.      Current Outpatient Medications on File Prior to Encounter   Medication Sig    buPROPion (WELLBUTRIN) 75 MG tablet Take 1 tablet (75 mg total) by mouth 2 (two) times daily.    cyanocobalamin, vitamin B-12, (VITAMIN B-12) 50 mcg tablet Take 50 mcg by mouth twice a week. Mon Thurs    ferrous sulfate (FEOSOL) 325 mg (65 mg iron) Tab tablet Take 325 mg by mouth twice a week. Mon Thurs    FLUoxetine 40 MG capsule Take 40 mg by mouth once daily.     gabapentin (NEURONTIN) 100 MG capsule Take 100 mg by mouth 3 (three) times daily.    lactobacillus acidophilus & bulgar (LACTINEX) 100 million cell packet Take 1 packet (1 each total) by mouth 2 (two) times daily. for 15 days    lisinopril-hydrochlorothiazide (PRINZIDE,ZESTORETIC) 20-25 mg Tab Take 1 tablet by mouth once daily.    lovastatin (MEVACOR) 20 MG tablet Take 20 mg by mouth every evening.    oxyCODONE-acetaminophen (PERCOCET)  mg per tablet Take 1 tablet by mouth every 6 (six) hours as needed for Pain.    pantoprazole (PROTONIX) 40 MG tablet Take 1 tablet (40 mg total) by mouth once daily.    traZODone (DESYREL) 50 MG tablet Take 50 mg by mouth every evening.    ANORO ELLIPTA 62.5-25 mcg/actuation DsDv Inhale 1 puff into the lungs daily as needed (SOB).     [START ON 10/5/2019] aspirin (ECOTRIN) 81 MG EC tablet Take 1 tablet (81 mg total) by mouth 2 (two) times daily. 2 tablets daily    aspirin 325 MG tablet Take 1 tablet (325 mg total) by mouth 2 (two) times daily.    fluticasone (FLONASE) 50 mcg/actuation nasal spray 1 spray by Each Nostril route every evening.     VENTOLIN HFA 90 mcg/actuation inhaler Inhale 1 puff into the lungs daily as needed for Wheezing or  Shortness of Breath.      Family History     Problem Relation (Age of Onset)    Alzheimer's disease Mother        Tobacco Use    Smoking status: Light Tobacco Smoker     Packs/day: 0.50     Years: 30.00     Pack years: 15.00     Types: Cigarettes    Smokeless tobacco: Never Used   Substance and Sexual Activity    Alcohol use: Yes     Comment: RARELY    Drug use: Never    Sexual activity: Not on file     Review of Systems   Constitutional: Positive for activity change and fatigue. Negative for diaphoresis and fever.   Respiratory: Negative for cough, chest tightness, shortness of breath and wheezing.    Cardiovascular: Negative for chest pain, palpitations and leg swelling.   Gastrointestinal: Negative for abdominal distention, abdominal pain, constipation, diarrhea, nausea and vomiting.   Genitourinary: Negative for difficulty urinating, dysuria, flank pain and hematuria.   Musculoskeletal: Positive for arthralgias. Negative for gait problem, joint swelling and neck pain.        Knee pain   Skin: Negative for rash and wound.   Neurological: Negative for dizziness, syncope, speech difficulty, light-headedness, numbness and headaches.   Psychiatric/Behavioral: Negative for agitation, behavioral problems and confusion.     Objective:     Vital Signs (Most Recent):  Temp: 97.8 °F (36.6 °C) (09/16/19 0325)  Pulse: 75 (09/16/19 0325)  Resp: 18 (09/16/19 0325)  BP: (!) 90/50 (09/16/19 0325)  SpO2: 95 % (09/16/19 0325) Vital Signs (24h Range):  Temp:  [97.5 °F (36.4 °C)-98.6 °F (37 °C)] 97.8 °F (36.6 °C)  Pulse:  [73-76] 75  Resp:  [14-20] 18  SpO2:  [92 %-96 %] 95 %  BP: ()/(50-55) 90/50     Weight: 127 kg (280 lb)  Body mass index is 51.21 kg/m².    Physical Exam   Constitutional: She is oriented to person, place, and time. She appears well-developed and well-nourished.   HENT:   Head: Normocephalic and atraumatic.   Right Ear: External ear normal.   Left Ear: External ear normal.   Mouth/Throat: Oropharynx is  clear and moist.   Eyes: Pupils are equal, round, and reactive to light. Conjunctivae and EOM are normal.   Neck: Normal range of motion. Neck supple. No JVD present.   Cardiovascular: Normal rate, regular rhythm, normal heart sounds and intact distal pulses.   No murmur heard.  +2  pitting edema to right lower extremity +1 to right lower extremity.   Pulmonary/Chest: Effort normal and breath sounds normal. No respiratory distress. She has no wheezes.   Abdominal: Soft. Bowel sounds are normal. She exhibits no distension. There is no tenderness. There is no guarding.   Grossly protuberant   Musculoskeletal: Normal range of motion. She exhibits no edema or tenderness.   Wound VAC over the right knee joint   Neurological: She is alert and oriented to person, place, and time. No cranial nerve deficit. Coordination normal.   Skin: Skin is warm and dry. Capillary refill takes less than 2 seconds. No rash noted.   Wound VAC in place to right knee.  Chronic skin changes bilaterally.   Psychiatric: She has a normal mood and affect. Her behavior is normal. Judgment and thought content normal.   Nursing note and vitals reviewed.        CRANIAL NERVES     CN III, IV, VI   Pupils are equal, round, and reactive to light.  Extraocular motions are normal.        Significant Labs:   BMP:   Recent Labs   Lab 09/16/19  0505   GLU 97   *   K 3.6   CL 98   CO2 27   BUN 15   CREATININE 1.2   CALCIUM 8.7     CBC:   Recent Labs   Lab 09/16/19  0505   WBC 8.67   HGB 8.9*   HCT 29.1*   *       Significant Imaging: I have reviewed all pertinent imaging results/findings within the past 24 hours.      Assessment/Plan:      * Infection of right knee  The patient had another I and D of the right knee on 09/12/2019 and a wound VAC was placed.  Dr. To saw the patient today and will continue IV antibiotics.  She is currently on vancomycin and Ancef IV.  Wound care consulted    Acute diastolic heart failure  Possible.  DC IV Lasix.   Start oral Lasix 20 mg daily. Monitor I/O.  2D echocardiogram results reviewed.      Hyperglycemia  Check hemoglobin A1c.  Add Accu-Cheks and insulin as needed.      Hyponatremia  Slightly decreased.  Monitor BMP.      Chronic kidney disease, stage III (moderate)  Chronic.  At baseline renal function.  Continue to monitor renal function      Surgical wound dehiscence, subsequent encounter  Wound Care consulted.  Wound VAC in place      Thrombocytosis  This is probably reactive thrombocytosis      Normocytic anemia  The patient has been anemic for several months and her hemoglobin is at baseline  Check iron studies-Fe sat 5%. Add venofer x 1 dose and po fergon  Add additional Venofer    Obesity, Class III, BMI 40-49.9 (morbid obesity)  Body mass index is 51.21 kg/m².  General weight loss/lifestyle modification strategies discussed (elicit support from others; identify saboteurs; non-food rewards, etc).        Essential hypertension  Her blood pressure is on the low side right now therefore I will hold her antihypertensives      Mild asthma without complication  Stable.  No evidence of exacerbation.     I had a lengthy discussion with patient and her daughter.  Patient wants to go home.  I explained patient she would benefit with Skilled Nursing Facility/LTAC placement depending upon Infectious Disease recommendations.  Continue wound care with wound VAC per protocol.  Patient is also set up with Plastic surgery team and couple of days.  Discussed with  regarding discharge disposition to discussed with patient and family members. Time spent in care of the patient, counseling and coordination of care (Greater than 50% spent in direct face to face contact): 35 min.    VTE Risk Mitigation (From admission, onward)        Ordered     IP VTE HIGH RISK PATIENT  Once      09/12/19 1719     Place sequential compression device  Until discontinued      09/12/19 1719     Place KAROLINA hose  Until discontinued       09/12/19 1719                Yazmin Jules MD  Department of Hospital Medicine   [facility]

## 2019-09-16 NOTE — PLAN OF CARE
Problem: Adult Inpatient Plan of Care  Goal: Plan of Care Review  Outcome: Ongoing (interventions implemented as appropriate)  Patient AAO X 4. Patient free from injury during shift. Pain managed pharmacologically. Provided full relief. Patient free from N/V during shift. IV access has IV abx. Wound vac in place. Dressing is clean, dry, and intact. Small amount of drainage. Pure wick in place draining clear yellow urine.  Remained afebrile during shift. Restroom offered. Repositioned as needed. Safety maintained. Bed in lowest position, call light and personal items within reach. Patient verbalized understanding of care. Will continue to monitor with every 2 hour rounding.

## 2019-09-16 NOTE — PLAN OF CARE
Problem: Physical Therapy Goal  Goal: Physical Therapy Goal  Goals to be met by: 2019     Patient will increase functional independence with mobility by performin. Supine to sit with Modified [place]  2. Sit to supine with Modified [place]  3. Sit to stand transfer with Contact Guard Assistance  4. Bed to chair transfer with Contact Guard Assistance using Rolling Walker  5. Gait  x 250 feet with Contact Guard Assistance using Rolling Walker.   6. Lower extremity exercise program x10-20 reps per handout, with independence     Outcome: Ongoing (interventions implemented as appropriate)  Ambulation to restroom 10' RW SBA and 250' RW SBA with wound vac to improve independence.

## 2019-09-16 NOTE — PROGRESS NOTES
Post-op    Pt AAOx3. Sitting in chair. Has ambulated.    Right knee - Wound vac clean and inplace. No redness. No swelling.    Plan - Cultures grew STENOTROPHOMONAS (X.) MALTOPHILIA and Proteus. Antibiotics per infectious disease recommendations. Continue wound vac with 48 hour changes. Stable from ortho standpoint to rehab DC. FU with plastic surgery this week and my office next week. Again counseled on not smoking.

## 2019-09-16 NOTE — PLAN OF CARE
09/16/19 0809   PRE-TX-O2   O2 Device (Oxygen Therapy) room air   SpO2 95 %   Pulse Oximetry Type Intermittent   $ Pulse Oximetry - Multiple Charge Pulse Oximetry - Multiple

## 2019-09-16 NOTE — SUBJECTIVE & OBJECTIVE
Past Medical History:   Diagnosis Date    Arthritis     Asthma     Encounter for blood transfusion     Hypertension        Past Surgical History:   Procedure Laterality Date    ARTHROPLASTY, KNEE Right 2019    Performed by Delio [facility]ung MD at [facility] OR     SECTION      INCISION AND DRAINAGE, LOWER EXTREMITY Right 2019    Performed by [facility]io [facility]ung MD at [facility] OR    INCISION AND DRAINAGE, LOWER EXTREMITY Right 2019    Performed by Delio [facility]ung MD at [facility] OR    REPLACEMENT, WOUND VAC Right 2019    Performed by Delio Chung MD at [facility] OR    TONSILLECTOMY         Review of patient's allergies indicates:  No Known Allergies    No current facility-administered medications on file prior to encounter.      Current Outpatient Medications on File Prior to Encounter   Medication Sig    buPROPion (WELLBUTRIN) 75 MG tablet Take 1 tablet (75 mg total) by mouth 2 (two) times daily.    cyanocobalamin, vitamin B-12, (VITAMIN B-12) 50 mcg tablet Take 50 mcg by mouth twice a week.     ferrous sulfate (FEOSOL) 325 mg (65 mg iron) Tab tablet Take 325 mg by mouth twice a week.     FLUoxetine 40 MG capsule Take 40 mg by mouth once daily.     gabapentin (NEURONTIN) 100 MG capsule Take 100 mg by mouth 3 (three) times daily.    lactobacillus acidophilus & bulgar (LACTINEX) 100 million cell packet Take 1 packet (1 each total) by mouth 2 (two) times daily. for 15 days    lisinopril-hydrochlorothiazide (PRINZIDE,ZESTORETIC) 20-25 mg Tab Take 1 tablet by mouth once daily.    lovastatin (MEVACOR) 20 MG tablet Take 20 mg by mouth every evening.    oxyCODONE-acetaminophen (PERCOCET)  mg per tablet Take 1 tablet by mouth every 6 (six) hours as needed for Pain.    pantoprazole (PROTONIX) 40 MG tablet Take 1 tablet (40 mg total) by mouth once daily.    traZODone (DESYREL) 50 MG tablet Take 50 mg by mouth every evening.    ANORO ELLIPTA 62.5-25 mcg/actuation DsDv  Inhale 1 puff into the lungs daily as needed (SOB).     [START ON 10/5/2019] aspirin (ECOTRIN) 81 MG EC tablet Take 1 tablet (81 mg total) by mouth 2 (two) times daily. 2 tablets daily    aspirin 325 MG tablet Take 1 tablet (325 mg total) by mouth 2 (two) times daily.    fluticasone (FLONASE) 50 mcg/actuation nasal spray 1 spray by Each Nostril route every evening.     VENTOLIN HFA 90 mcg/actuation inhaler Inhale 1 puff into the lungs daily as needed for Wheezing or Shortness of Breath.      Family History     Problem Relation (Age of Onset)    Alzheimer's disease Mother        Tobacco Use    Smoking status: Light Tobacco Smoker     Packs/day: 0.50     Years: 30.00     Pack years: 15.00     Types: Cigarettes    Smokeless tobacco: Never Used   Substance and Sexual Activity    Alcohol use: Yes     Comment: RARELY    Drug use: Never    Sexual activity: Not on file     Review of Systems   Constitutional: Positive for activity change and fatigue. Negative for diaphoresis and fever.   Respiratory: Negative for cough, chest tightness, shortness of breath and wheezing.    Cardiovascular: Negative for chest pain, palpitations and leg swelling.   Gastrointestinal: Negative for abdominal distention, abdominal pain, constipation, diarrhea, nausea and vomiting.   Genitourinary: Negative for difficulty urinating, dysuria, flank pain and hematuria.   Musculoskeletal: Positive for arthralgias. Negative for gait problem, joint swelling and neck pain.        Knee pain   Skin: Negative for rash and wound.   Neurological: Negative for dizziness, syncope, speech difficulty, light-headedness, numbness and headaches.   Psychiatric/Behavioral: Negative for agitation, behavioral problems and confusion.     Objective:     Vital Signs (Most Recent):  Temp: 97.8 °F (36.6 °C) (09/16/19 0325)  Pulse: 75 (09/16/19 0325)  Resp: 18 (09/16/19 0325)  BP: (!) 90/50 (09/16/19 0325)  SpO2: 95 % (09/16/19 0325) Vital Signs (24h Range):  Temp:   [97.5 °F (36.4 °C)-98.6 °F (37 °C)] 97.8 °F (36.6 °C)  Pulse:  [73-76] 75  Resp:  [14-20] 18  SpO2:  [92 %-96 %] 95 %  BP: ()/(50-55) 90/50     Weight: 127 kg (280 lb)  Body mass index is 51.21 kg/m².    Physical Exam   Constitutional: She is oriented to person, place, and time. She appears well-developed and well-nourished.   HENT:   Head: Normocephalic and atraumatic.   Right Ear: External ear normal.   Left Ear: External ear normal.   Mouth/Throat: Oropharynx is clear and moist.   Eyes: Pupils are equal, round, and reactive to light. Conjunctivae and EOM are normal.   Neck: Normal range of motion. Neck supple. No JVD present.   Cardiovascular: Normal rate, regular rhythm, normal heart sounds and intact distal pulses.   No murmur heard.  +2  pitting edema to right lower extremity +1 to right lower extremity.   Pulmonary/Chest: Effort normal and breath sounds normal. No respiratory distress. She has no wheezes.   Abdominal: Soft. Bowel sounds are normal. She exhibits no distension. There is no tenderness. There is no guarding.   Grossly protuberant   Musculoskeletal: Normal range of motion. She exhibits no edema or tenderness.   Wound VAC over the right knee joint   Neurological: She is alert and oriented to person, place, and time. No cranial nerve deficit. Coordination normal.   Skin: Skin is warm and dry. Capillary refill takes less than 2 seconds. No rash noted.   Wound VAC in place to right knee.  Chronic skin changes bilaterally.   Psychiatric: She has a normal mood and affect. Her behavior is normal. Judgment and thought content normal.   Nursing note and vitals reviewed.        CRANIAL NERVES     CN III, IV, VI   Pupils are equal, round, and reactive to light.  Extraocular motions are normal.        Significant Labs:   BMP:   Recent Labs   Lab 09/16/19  0505   GLU 97   *   K 3.6   CL 98   CO2 27   BUN 15   CREATININE 1.2   CALCIUM 8.7     CBC:   Recent Labs   Lab 09/16/19  0505   WBC 8.67   HGB  8.9*   HCT 29.1*   *       Significant Imaging: I have reviewed all pertinent imaging results/findings within the past 24 hours.

## 2019-09-16 NOTE — PROGRESS NOTES
Progress Note  Infectious Disease    Reason for Consult:  ID follow-up    HPI: Iris Mueller is a  63 y.o. female who underwent a right TKA on 08/14/2019 by Dr. Chung  she  presented to ED on 08/29/2019 with  bloody and foul-smelling drainage  from right knee incision which was partially dehisced after hematoma was expressed from the wound.  She required debridement, suffered a sepsis syndrome and required a short ICU stay with wound cultures growing Proteus, Klebsiella and E coli.  This wound infection was felt to be superficial and not involve the joint and she was ultimately discharged to skilled nursing facility on Rocephin with leg in straight leg position to avoid further dehiscence.  She was sent back to the hospital for revision of the wound after it deteriorated and was taken to surgery today where it was debrided and wound VAC was placed .  New cultures were submitted and she was given vancomycin.  Images of the wound were seen on her daughter's phone with broken sutures, slough within the widening incision and with no surrounding cellulitis of significance.  She did not have any fever, chills, sweats or constitutional symptoms.  Her only abscess event while on the antibiotics was mild perineal candidiasis.    9/13:  In good spirits. Walked with PT. Received some lasix and voiding well. No antibiotic intolerance. Cultures are too young. D/w Dr. chung  9/14: in good spirits, ambulated with physical therapy. No lung, GI ir  problems. Cultures so far have proteus(which she had before) and stenotrophomonas. No staph. No picc problems. Still has some vaginal candida symptoms.     09/16/2019 wound vac in place. No fever, Doing well with PT. Does not want to go to [facility]       Antimicrobials (From admission, onward)    Ordered     Dose Route Frequency Start Stop    09/14/19 1303  levoFLOXacin tablet 500 mg     INDICATION:  Skin & Soft Tissue        500 mg Oral Daily 09/14/19 1315 --     09/12/19 1929  cefTRIAXone (ROCEPHIN) 2 g in dextrose 5 % 50 mL IVPB     INDICATION:  Skin & Soft Tissue        2 g IV Every 24 hours (non-standard times) 09/12/19 2100 --        Antifungals (From admission, onward)    Start     Stop Route Frequency Ordered    09/14/19 1400  miconazole 2 % cream      -- Top 2 times daily 09/14/19 1340    09/12/19 2000  fluconazole tablet 200 mg      -- Oral Daily 09/12/19 1929        Antivirals (From admission, onward)    None          EXAM & DIAGNOSTICS REVIEWED:   Vitals:     Temp:  [97.5 °F (36.4 °C)-98.6 °F (37 °C)]   Temp: 97.8 °F (36.6 °C) (09/16/19 0325)  Pulse: 75 (09/16/19 0325)  Resp: 18 (09/16/19 0325)  BP: (!) 90/50 (09/16/19 0325)  SpO2: 95 % (09/16/19 0325)  No intake or output data in the 24 hours ending 09/16/19 0653    General:  In NAD. Alert and attentive, cooperative, comfortable  Eyes:  Anicteric,   EOMI  ENT:      Neck:     Lungs:    Heart:     Abd:     :    Musc:  Excluding right knee, no Joints without effusion, swelling, erythema, synovitis .  Wound vac on right knee  Skin:  No rashes.   Wound: Wound vac in place,     Neuro:   Alert, attentive, speech fluent, face symmetric, moves all extremities, no focal weakness. Ambulatory  Psych:  Calm, cooperative   extrem: Excluding right knee, No pitting edema, erythema, phlebitis, cellulitis, warm and well perfused  VAD:  Right arm PICC line     Isolation:     On ADMISSION??           Lines/Tubes/Drains:    General Labs reviewed:  Recent Labs   Lab 09/12/19  1054 09/12/19 1847 09/16/19  0505   WBC 7.97 11.69 8.67   HGB 9.4* 9.6* 8.9*   HCT 30.8* 31.3* 29.1*   * 429* 479*       Recent Labs   Lab 09/12/19  1847 09/14/19  0434 09/16/19  0505   * 136 135*   K 4.0 4.0 3.6   CL 99 97 98   CO2 24 28 27   BUN 16 16 15   CREATININE 1.1 1.1 1.2   CALCIUM 9.2 9.4 8.7           Micro:  Microbiology Results (last 7 days)     Procedure Component Value Units Date/Time    Aerobic culture [[id number]]  (Abnormal)  Collected:  09/12/19 1521    Order Status:  Completed Specimen:  Body Fluid from Knee, Right Updated:  09/14/19 1243     Aerobic Bacterial Culture PRESUMPTIVE PROTEUS SPECIES  Moderate  Identification and susceptibility pending        STENOTROPHOMONAS (X.) MALTOPHILIA  Moderate  Susceptibility pending      Culture, Anaerobic [[id number]] Collected:  09/12/19 1521    Order Status:  Sent Specimen:  Body Fluid from Knee, Right Updated:  09/12/19 2312      Susceptibility      Proteus mirabilis Escherichia coli     CULTURE, AEROBIC  (SPECIFY SOURCE) CULTURE, AEROBIC  (SPECIFY SOURCE)     Amox/K Clav'ate <=8/4 mcg/mL Sensitive <=8/4 mcg/mL Sensitive     Amp/Sulbactam <=8/4 mcg/mL Sensitive <=8/4 mcg/mL Sensitive     Ampicillin <=8 mcg/mL Sensitive <=8 mcg/mL Sensitive     Cefazolin 4 mcg/mL Sensitive <=2 mcg/mL Sensitive     Cefepime <=2 mcg/mL Sensitive <=2 mcg/mL Sensitive     Ceftriaxone <=1 mcg/mL Sensitive <=1 mcg/mL Sensitive     Ciprofloxacin <=1 mcg/mL Sensitive <=1 mcg/mL Sensitive     Gentamicin <=4 mcg/mL Sensitive <=4 mcg/mL Sensitive     Levofloxacin <=2 mcg/mL Sensitive <=2 mcg/mL Sensitive     Piperacillin/Tazo <=16 mcg/mL Sensitive <=16 mcg/mL Sensitive     Tetracycline   >8 mcg/mL Resistant     Tobramycin <=4 mcg/mL Sensitive <=4 mcg/mL Sensitive     Trimeth/Sulfa <=2/38 mcg/mL Sensitive <=2/38 mcg/mL Sensitive              Linear View     Aerobic culture [[id number]] (Abnormal)  Collected: 08/29/19 1634   Order Status: Completed Specimen: Incision site from Leg, Right Updated: 09/04/19 0642    Aerobic Bacterial Culture PROTEUS MIRABILIS   Many   Abnormal      KLEBSIELLA PNEUMONIAE   Moderate   Abnormal      ESCHERICHIA COLI   Moderate   No other significant isolate   Abnormal    Susceptibility      Proteus mirabilis Klebsiella pneumoniae Escherichia coli     CULTURE, AEROBIC  (SPECIFY SOURCE) CULTURE, AEROBIC  (SPECIFY SOURCE) CULTURE, AEROBIC  (SPECIFY SOURCE)     Amox/K Clav'ate <=8/4 mcg/mL Sensitive  <=8/4 mcg/mL Sensitive <=8/4 mcg/mL Sensitive     Amp/Sulbactam <=8/4 mcg/mL Sensitive <=8/4 mcg/mL Sensitive <=8/4 mcg/mL Sensitive     Ampicillin <=8 mcg/mL Sensitive   <=8 mcg/mL Sensitive     Cefazolin <=8 mcg/mL Sensitive <=2 mcg/mL Sensitive <=2 mcg/mL Sensitive     Cefepime <=8 mcg/mL Sensitive <=2 mcg/mL Sensitive <=2 mcg/mL Sensitive     Ceftriaxone <=8 mcg/mL Sensitive <=1 mcg/mL Sensitive <=1 mcg/mL Sensitive     Ciprofloxacin <=1 mcg/mL Sensitive <=1 mcg/mL Sensitive <=1 mcg/mL Sensitive     Gentamicin <=4 mcg/mL Sensitive <=4 mcg/mL Sensitive <=4 mcg/mL Sensitive     Levofloxacin <=2 mcg/mL Sensitive <=2 mcg/mL Sensitive <=2 mcg/mL Sensitive     Piperacillin/Tazo <=16 mcg/mL Sensitive <=16 mcg/mL Sensitive <=16 mcg/mL Sensitive     Tetracycline   >8 mcg/mL Resistant <=4 mcg/mL Sensitive     Tobramycin <=4 mcg/mL Sensitive <=4 mcg/mL Sensitive <=4 mcg/mL Sensitive     Trimeth/Sulfa <=2/38 mcg/mL Sensitive <=2/38 mcg/mL Sensitive <=2/38 mcg/mL Sensitive                Linear View             Imaging Reviewed:      Cardiology:    IMPRESSION & PLAN   1.   S/p wound revision, debridement and placement of wound vac for dehiscing knee incision. Proteus and stentotrophomonas  2. Recent wound infection with Proteus, Ecoli and klebsiella on rocephin since original debridement  3. Poor wound healing due to edema, smoking, etc  4. TKA in situ  5. Perineal candidiasis    Recommendations:  Continue rocephin,   diflucan  Discontinued vanc on 15   Added levaquin for the stentotrophomonas. I called micro, it is S to levoflocxacin  Wound vac change q 48 hours as per surgeon

## 2019-09-17 ENCOUNTER — OUTSIDE PLACE OF SERVICE (OUTPATIENT)
Dept: INFECTIOUS DISEASES | Facility: CLINIC | Age: 64
End: 2019-09-17
Payer: MEDICARE

## 2019-09-17 DIAGNOSIS — T81.31XD SURGICAL WOUND DEHISCENCE, SUBSEQUENT ENCOUNTER: ICD-10-CM

## 2019-09-17 DIAGNOSIS — A41.9 SEVERE SEPSIS: ICD-10-CM

## 2019-09-17 DIAGNOSIS — D64.9 NORMOCYTIC ANEMIA: ICD-10-CM

## 2019-09-17 DIAGNOSIS — M00.9 INFECTION OF RIGHT KNEE: ICD-10-CM

## 2019-09-17 DIAGNOSIS — R73.9 HYPERGLYCEMIA: ICD-10-CM

## 2019-09-17 DIAGNOSIS — A41.9 SEPTIC SHOCK: ICD-10-CM

## 2019-09-17 DIAGNOSIS — R65.20 SEVERE SEPSIS: ICD-10-CM

## 2019-09-17 DIAGNOSIS — N18.30 CHRONIC KIDNEY DISEASE, STAGE III (MODERATE): ICD-10-CM

## 2019-09-17 DIAGNOSIS — R65.21 SEPTIC SHOCK: ICD-10-CM

## 2019-09-17 DIAGNOSIS — D75.839 THROMBOCYTOSIS: ICD-10-CM

## 2019-09-17 DIAGNOSIS — E87.1 HYPONATREMIA: ICD-10-CM

## 2019-09-17 LAB
BACTERIA SPEC ANAEROBE CULT: NORMAL
CRP SERPL-MCNC: 39.5 MG/L (ref 0–8.2)
ERYTHROCYTE [SEDIMENTATION RATE] IN BLOOD BY WESTERGREN METHOD: 65 MM/HR (ref 0–20)

## 2019-09-17 PROCEDURE — 36415 COLL VENOUS BLD VENIPUNCTURE: CPT

## 2019-09-17 PROCEDURE — 99231 SBSQ HOSP IP/OBS SF/LOW 25: CPT | Mod: S$GLB,,, | Performed by: INTERNAL MEDICINE

## 2019-09-17 PROCEDURE — 25000003 PHARM REV CODE 250: Performed by: INTERNAL MEDICINE

## 2019-09-17 PROCEDURE — 63600175 PHARM REV CODE 636 W HCPCS: Performed by: INTERNAL MEDICINE

## 2019-09-17 PROCEDURE — 97116 GAIT TRAINING THERAPY: CPT

## 2019-09-17 PROCEDURE — 99900035 HC TECH TIME PER 15 MIN (STAT)

## 2019-09-17 PROCEDURE — 25000003 PHARM REV CODE 250: Performed by: ORTHOPAEDIC SURGERY

## 2019-09-17 PROCEDURE — 99231 PR SUBSEQUENT HOSPITAL CARE,LEVL I: ICD-10-PCS | Mod: S$GLB,,, | Performed by: INTERNAL MEDICINE

## 2019-09-17 PROCEDURE — 85651 RBC SED RATE NONAUTOMATED: CPT

## 2019-09-17 PROCEDURE — 25000003 PHARM REV CODE 250: Performed by: NURSE PRACTITIONER

## 2019-09-17 PROCEDURE — 94761 N-INVAS EAR/PLS OXIMETRY MLT: CPT

## 2019-09-17 PROCEDURE — 86140 C-REACTIVE PROTEIN: CPT

## 2019-09-17 PROCEDURE — 12000002 HC ACUTE/MED SURGE SEMI-PRIVATE ROOM

## 2019-09-17 RX ADMIN — BUPROPION HYDROCHLORIDE 75 MG: 75 TABLET, FILM COATED ORAL at 08:09

## 2019-09-17 RX ADMIN — GABAPENTIN 100 MG: 100 CAPSULE ORAL at 04:09

## 2019-09-17 RX ADMIN — GABAPENTIN 100 MG: 100 CAPSULE ORAL at 08:09

## 2019-09-17 RX ADMIN — MORPHINE SULFATE 2 MG: 2 INJECTION, SOLUTION INTRAMUSCULAR; INTRAVENOUS at 04:09

## 2019-09-17 RX ADMIN — LOVASTATIN 20 MG: 20 TABLET ORAL at 08:09

## 2019-09-17 RX ADMIN — BUPROPION HYDROCHLORIDE 75 MG: 75 TABLET, FILM COATED ORAL at 10:09

## 2019-09-17 RX ADMIN — HYDROCODONE BITARTRATE AND ACETAMINOPHEN 1 TABLET: 10; 325 TABLET ORAL at 05:09

## 2019-09-17 RX ADMIN — HYDROCODONE BITARTRATE AND ACETAMINOPHEN 1 TABLET: 10; 325 TABLET ORAL at 10:09

## 2019-09-17 RX ADMIN — TRAZODONE HYDROCHLORIDE 50 MG: 50 TABLET ORAL at 08:09

## 2019-09-17 RX ADMIN — LEVOFLOXACIN 500 MG: 500 TABLET, FILM COATED ORAL at 10:09

## 2019-09-17 RX ADMIN — FLUOXETINE 40 MG: 20 CAPSULE ORAL at 10:09

## 2019-09-17 RX ADMIN — CEFTRIAXONE 2 G: 2 INJECTION, SOLUTION INTRAVENOUS at 08:09

## 2019-09-17 RX ADMIN — MICONAZOLE NITRATE: 20 CREAM TOPICAL at 10:09

## 2019-09-17 RX ADMIN — ALTEPLASE 2 MG: 2.2 INJECTION, POWDER, LYOPHILIZED, FOR SOLUTION INTRAVENOUS at 07:09

## 2019-09-17 RX ADMIN — GABAPENTIN 100 MG: 100 CAPSULE ORAL at 10:09

## 2019-09-17 RX ADMIN — PANTOPRAZOLE SODIUM 40 MG: 40 TABLET, DELAYED RELEASE ORAL at 10:09

## 2019-09-17 RX ADMIN — FERROUS SULFATE TAB EC 325 MG (65 MG FE EQUIVALENT) 325 MG: 325 (65 FE) TABLET DELAYED RESPONSE at 10:09

## 2019-09-17 RX ADMIN — MORPHINE SULFATE 2 MG: 2 INJECTION, SOLUTION INTRAMUSCULAR; INTRAVENOUS at 06:09

## 2019-09-17 RX ADMIN — HYDROCODONE BITARTRATE AND ACETAMINOPHEN 1 TABLET: 10; 325 TABLET ORAL at 03:09

## 2019-09-17 RX ADMIN — FLUCONAZOLE 200 MG: 100 TABLET ORAL at 08:09

## 2019-09-17 NOTE — PROGRESS NOTES
Progress Note  Infectious Disease    Reason for Consult:  ID follow-up    HPI: Iris Mueller is a  63 y.o. female who underwent a right TKA on 08/14/2019 by Dr. Chung  she  presented to ED on 08/29/2019 with  bloody and foul-smelling drainage  from right knee incision which was partially dehisced after hematoma was expressed from the wound.  She required debridement, suffered a sepsis syndrome and required a short ICU stay with wound cultures growing Proteus, Klebsiella and E coli.  This wound infection was felt to be superficial and not involve the joint and she was ultimately discharged to skilled nursing facility on Rocephin with leg in straight leg position to avoid further dehiscence.  She was sent back to the hospital for revision of the wound after it deteriorated and was taken to surgery today where it was debrided and wound VAC was placed .  New cultures were submitted and she was given vancomycin.  Images of the wound were seen on her daughter's phone with broken sutures, slough within the widening incision and with no surrounding cellulitis of significance.  She did not have any fever, chills, sweats or constitutional symptoms.  Her only abscess event while on the antibiotics was mild perineal candidiasis.    9/13:  In good spirits. Walked with PT. Received some lasix and voiding well. No antibiotic intolerance. Cultures are too young. D/w Dr. chung  9/14: in good spirits, ambulated with physical therapy. No lung, GI ir  problems. Cultures so far have proteus(which she had before) and stenotrophomonas. No staph. No picc problems. Still has some vaginal candida symptoms.     09/16/2019 wound vac in place. No fever, Doing well with PT. Does not want to go to [facility]    9/17 reviewed. Disagreement between daughter's wishes for safety and patient independence. It would be b est to get her to the plastic surgeon as soon as possible, as time exposed = risk for infection of TKA.         Antimicrobials (From admission, onward)    Ordered     Dose Route Frequency Start Stop    09/14/19 1303  levoFLOXacin tablet 500 mg     INDICATION:  Skin & Soft Tissue        500 mg Oral Daily 09/14/19 1315 --    09/12/19 1929  cefTRIAXone (ROCEPHIN) 2 g in dextrose 5 % 50 mL IVPB     INDICATION:  Skin & Soft Tissue        2 g IV Every 24 hours (non-standard times) 09/12/19 2100 --        Antifungals (From admission, onward)    Start     Stop Route Frequency Ordered    09/14/19 1400  miconazole 2 % cream      -- Top 2 times daily 09/14/19 1340    09/12/19 2000  fluconazole tablet 200 mg      -- Oral Daily 09/12/19 1929        Antivirals (From admission, onward)    None          EXAM & DIAGNOSTICS REVIEWED:   Vitals:     Temp:  [96.1 °F (35.6 °C)-98.3 °F (36.8 °C)]   Temp: 96.1 °F (35.6 °C) (09/17/19 1100)  Pulse: 70 (09/17/19 1100)  Resp: 18 (09/17/19 1100)  BP: (!) 97/44 (09/17/19 1100)  SpO2: 96 % (09/17/19 1100)    Intake/Output Summary (Last 24 hours) at 9/17/2019 1457  Last data filed at 9/17/2019 0621  Gross per 24 hour   Intake 1080 ml   Output --   Net 1080 ml       General:  In NAD. Alert and attentive, cooperative, comfortable  Eyes:  Anicteric,   EOMI  ENT:      Neck:     Lungs:    Heart:     Abd:     :    Musc:  Excluding right knee, no Joints without effusion, swelling, erythema, synovitis .     Skin:  No rashes.   Wound: Wound vac in place,     Neuro:   Alert, attentive, speech fluent, face symmetric, moves all extremities, no focal weakness. Ambulatory  Psych:  Calm, cooperative   extrem: Excluding right knee, less pitting edema, erythema, phlebitis, cellulitis, warm and well perfused  VAD:  Right arm PICC line     Isolation:     On ADMISSION??           Lines/Tubes/Drains:    General Labs reviewed:  Recent Labs   Lab 09/12/19  1054 09/12/19  1847 09/16/19  0505   WBC 7.97 11.69 8.67   HGB 9.4* 9.6* 8.9*   HCT 30.8* 31.3* 29.1*   * 429* 479*       Recent Labs   Lab 09/12/19  9053  09/14/19  0434 09/16/19  0505   * 136 135*   K 4.0 4.0 3.6   CL 99 97 98   CO2 24 28 27   BUN 16 16 15   CREATININE 1.1 1.1 1.2   CALCIUM 9.2 9.4 8.7           Micro:  Microbiology Results (last 7 days)     Procedure Component Value Units Date/Time    Culture, Anaerobic [[id number]] Collected:  09/12/19 1521    Order Status:  Completed Specimen:  Body Fluid from Knee, Right Updated:  09/17/19 1251     Anaerobic Culture No anaerobes isolated    Aerobic culture [[id number]]  (Abnormal)  (Susceptibility) Collected:  09/12/19 1521    Order Status:  Completed Specimen:  Body Fluid from Knee, Right Updated:  09/16/19 0952     Aerobic Bacterial Culture PROTEUS MIRABILIS  Moderate        STENOTROPHOMONAS (X.) MALTOPHILIA  Moderate        Susceptibility      Proteus mirabilis Escherichia coli     CULTURE, AEROBIC  (SPECIFY SOURCE) CULTURE, AEROBIC  (SPECIFY SOURCE)     Amox/K Clav'ate <=8/4 mcg/mL Sensitive <=8/4 mcg/mL Sensitive     Amp/Sulbactam <=8/4 mcg/mL Sensitive <=8/4 mcg/mL Sensitive     Ampicillin <=8 mcg/mL Sensitive <=8 mcg/mL Sensitive     Cefazolin 4 mcg/mL Sensitive <=2 mcg/mL Sensitive     Cefepime <=2 mcg/mL Sensitive <=2 mcg/mL Sensitive     Ceftriaxone <=1 mcg/mL Sensitive <=1 mcg/mL Sensitive     Ciprofloxacin <=1 mcg/mL Sensitive <=1 mcg/mL Sensitive     Gentamicin <=4 mcg/mL Sensitive <=4 mcg/mL Sensitive     Levofloxacin <=2 mcg/mL Sensitive <=2 mcg/mL Sensitive     Piperacillin/Tazo <=16 mcg/mL Sensitive <=16 mcg/mL Sensitive     Tetracycline   >8 mcg/mL Resistant     Tobramycin <=4 mcg/mL Sensitive <=4 mcg/mL Sensitive     Trimeth/Sulfa <=2/38 mcg/mL Sensitive <=2/38 mcg/mL Sensitive              Linear View     Aerobic culture [[id number]] (Abnormal)  Collected: 08/29/19 1634   Order Status: Completed Specimen: Incision site from Leg, Right Updated: 09/04/19 0642    Aerobic Bacterial Culture PROTEUS MIRABILIS   Many   Abnormal      KLEBSIELLA PNEUMONIAE   Moderate   Abnormal      ESCHERICHIA COLI    Moderate   No other significant isolate   Abnormal    Susceptibility      Proteus mirabilis Klebsiella pneumoniae Escherichia coli     CULTURE, AEROBIC  (SPECIFY SOURCE) CULTURE, AEROBIC  (SPECIFY SOURCE) CULTURE, AEROBIC  (SPECIFY SOURCE)     Amox/K Clav'ate <=8/4 mcg/mL Sensitive <=8/4 mcg/mL Sensitive <=8/4 mcg/mL Sensitive     Amp/Sulbactam <=8/4 mcg/mL Sensitive <=8/4 mcg/mL Sensitive <=8/4 mcg/mL Sensitive     Ampicillin <=8 mcg/mL Sensitive   <=8 mcg/mL Sensitive     Cefazolin <=8 mcg/mL Sensitive <=2 mcg/mL Sensitive <=2 mcg/mL Sensitive     Cefepime <=8 mcg/mL Sensitive <=2 mcg/mL Sensitive <=2 mcg/mL Sensitive     Ceftriaxone <=8 mcg/mL Sensitive <=1 mcg/mL Sensitive <=1 mcg/mL Sensitive     Ciprofloxacin <=1 mcg/mL Sensitive <=1 mcg/mL Sensitive <=1 mcg/mL Sensitive     Gentamicin <=4 mcg/mL Sensitive <=4 mcg/mL Sensitive <=4 mcg/mL Sensitive     Levofloxacin <=2 mcg/mL Sensitive <=2 mcg/mL Sensitive <=2 mcg/mL Sensitive     Piperacillin/Tazo <=16 mcg/mL Sensitive <=16 mcg/mL Sensitive <=16 mcg/mL Sensitive     Tetracycline   >8 mcg/mL Resistant <=4 mcg/mL Sensitive     Tobramycin <=4 mcg/mL Sensitive <=4 mcg/mL Sensitive <=4 mcg/mL Sensitive     Trimeth/Sulfa <=2/38 mcg/mL Sensitive <=2/38 mcg/mL Sensitive <=2/38 mcg/mL Sensitive                Linear View             Imaging Reviewed:      Cardiology:    IMPRESSION & PLAN   1.   S/p wound revision, debridement and placement of wound vac for dehiscing knee incision. Proteus and stentotrophomonas  2. Recent wound infection with Proteus, Ecoli and klebsiella on rocephin since original debridement  3. Poor wound healing due to edema, smoking, etc  4. TKA in situ  5. Perineal candidiasis    Recommendations:  Continue rocephin,   diflucan      stentotrophomonas.   it is S to levoflocxacin  Wound vac change q 48 hours as per surgeon  Duration of levaquin until wound is covered with skin  Duration of rocephin possibly until after wound is covered by plastic  surgery

## 2019-09-17 NOTE — PLAN OF CARE
"Faxed [facility] pt's SNF request for auth at [facility].  Updated Bess at [facility], [phone] ext [phone].     3:12 p.m. - faxed [facility] pt's MAR. Awaitng measurements of wound vac pads, gauze.     3:35 P.M. - received a call from Harvey at [facility] and Sharyn stated they could not admit pt with "a wound of that size."  They felt pt would do better in LTAC.  Updated Dr. Jules.  Updated Dr. To who stated okay with LTAC although she felt it would keep pt from following up with her plastic surgeon.  Updated Dr. Jules.  Plan to send referral to LTAC.         09/17/19 1502   Post-Acute Status   Post-Acute Authorization Placement   Post-Acute Placement Status Pending Service Contract     "

## 2019-09-17 NOTE — PLAN OF CARE
09/17/19 1050   Patient Assessment/Suction   Level of Consciousness (AVPU) alert   Respiratory Effort Normal;Unlabored   All Lung Fields Breath Sounds clear   Rhythm/Pattern, Respiratory no shortness of breath reported   PRE-TX-O2   O2 Device (Oxygen Therapy) room air   SpO2 95 %   Pulse Oximetry Type Intermittent   $ Pulse Oximetry - Multiple Charge Pulse Oximetry - Multiple   Pulse 71   Resp 18   Aerosol Therapy   $ Aerosol Therapy Charges PRN treatment not required

## 2019-09-17 NOTE — PLAN OF CARE
Spoke to daughter Barbara, work # [phone], to discuss discharge options.  She and pt are requesting LTAC in [place].  Informed her that we were waiting for pt's cltlures to come back to determine if pt would qualify for LTAC.  Informed her PT's recommendation was for home health.  Daughter stated pt is not able to care for herself at home and cannot return home at this time. I asked her if pt will return to [facility] if she does not qualify for LTAC.   Daughter states she is not sure at this time and they will have to discuss discharge options.  Daughter is aware that [facility] will need to authorize for the appropriate level of care.     Updated Dr. Jules who requested that we have pt's nurse call Dr. James for her IV medication recommendations.  Updated pt's nurse Vera.     2:20 p.m. - per Dr. To pt does not need LTAC and could discharge home on oral levaquin and follow up with plastic surgeon tomorrow to see if he will cover up the wound.  Informed her pt's family did not want pt to return home.  Updated Dr. Jules.       09/17/19 1230   Discharge Reassessment   Assessment Type Discharge Planning Reassessment   Discharge Plan A Skilled Nursing Facility

## 2019-09-17 NOTE — PLAN OF CARE
Referral faxed for LTAC to [facility] through [facility].  Updated Susan at [facility].  Updated pt and her spouse that [facility] denied readmission and we have sent referral to [facility] per her family request.    Notified Sunita at [facility], [phone] that [facility] had denied readmission for SNF and to cancel the SNF request.  Updated her that we had sent at referral to [facility].  Faxed [facility] the LTAC referral through [facility].      09/17/19 3115   Post-Acute Status   Post-Acute Authorization Placement   Post-Acute Placement Status Referrals Sent

## 2019-09-17 NOTE — SUBJECTIVE & OBJECTIVE
Interval History:  Patient is doing well.  Afebrile.  Knee pain stable.  Patient states her children does not want her to go home at this time therefore she will agree to go to skilled nursing facility.     Review of Systems   Constitutional: Positive for activity change and fatigue. Negative for diaphoresis and fever.   Respiratory: Negative for cough, chest tightness, shortness of breath and wheezing.    Cardiovascular: Negative for chest pain, palpitations and leg swelling.   Gastrointestinal: Negative for abdominal distention, abdominal pain, constipation, diarrhea, nausea and vomiting.   Genitourinary: Negative for difficulty urinating, dysuria, flank pain and hematuria.   Musculoskeletal: Positive for arthralgias. Negative for gait problem, joint swelling and neck pain.        Knee pain   Skin: Negative for rash and wound.   Neurological: Negative for dizziness, syncope, speech difficulty, light-headedness, numbness and headaches.   Psychiatric/Behavioral: Negative for agitation, behavioral problems and confusion.     Objective:     Vital Signs (Most Recent):  Temp: 96.1 °F (35.6 °C) (09/17/19 1100)  Pulse: 70 (09/17/19 1100)  Resp: 18 (09/17/19 1100)  BP: (!) 97/44 (09/17/19 1100)  SpO2: 96 % (09/17/19 1100) Vital Signs (24h Range):  Temp:  [96.1 °F (35.6 °C)-98.3 °F (36.8 °C)] 96.1 °F (35.6 °C)  Pulse:  [70-77] 70  Resp:  [16-18] 18  SpO2:  [94 %-97 %] 96 %  BP: ()/(44-59) 97/44     Weight: 127 kg (280 lb)  Body mass index is 51.21 kg/m².    Intake/Output Summary (Last 24 hours) at 9/17/2019 1541  Last data filed at 9/17/2019 0621  Gross per 24 hour   Intake 1080 ml   Output --   Net 1080 ml      Physical Exam   Constitutional: She is oriented to person, place, and time. She appears well-developed and well-nourished.   HENT:   Head: Normocephalic and atraumatic.   Right Ear: External ear normal.   Left Ear: External ear normal.   Mouth/Throat: Oropharynx is clear and moist.   Eyes: Pupils are equal,  round, and reactive to light. Conjunctivae and EOM are normal.   Neck: Normal range of motion. Neck supple. No JVD present.   Cardiovascular: Normal rate, regular rhythm, normal heart sounds and intact distal pulses.   No murmur heard.  +2  pitting edema to right lower extremity +1 to right lower extremity.   Pulmonary/Chest: Effort normal and breath sounds normal. No respiratory distress. She has no wheezes.   Abdominal: Soft. Bowel sounds are normal. She exhibits no distension. There is no tenderness. There is no guarding.   Grossly protuberant   Musculoskeletal: Normal range of motion. She exhibits no edema or tenderness.   Wound VAC over the right knee joint   Neurological: She is alert and oriented to person, place, and time. No cranial nerve deficit. Coordination normal.   Skin: Skin is warm and dry. Capillary refill takes less than 2 seconds. No rash noted.   Wound VAC in place to right knee.  Chronic skin changes bilaterally.   Psychiatric: She has a normal mood and affect. Her behavior is normal. Judgment and thought content normal.   Nursing note and vitals reviewed.      Significant Labs:   CBC:   Recent Labs   Lab 09/16/19  0505   WBC 8.67   HGB 8.9*   HCT 29.1*   *     CMP:   Recent Labs   Lab 09/16/19  0505 09/16/19  0508   *  --    K 3.6  --    CL 98  --    CO2 27  --    GLU 97  --    BUN 15  --    CREATININE 1.2  --    CALCIUM 8.7  --    ALBUMIN  --  2.7*   ANIONGAP 10  --    EGFRNONAA 48*  --        Significant Imaging: None

## 2019-09-17 NOTE — PROGRESS NOTES
[facility] Hospital Medicine  Progress Note    Patient Name: Iris Mueller  MRN: [id number]  Patient Class: IP- Inpatient   Admission Date: 9/12/2019  Length of Stay: 5 days  Attending Physician: Yazmin Jules MD  Primary Care Provider: Elkin You MD        Subjective:     Principal Problem:Infection of right knee        HPI:  The patient is a 63-year-old woman who underwent a right total knee arthroplasty August 14th of this year.  She was admitted August 30th with infection in the right knee and had an I and D done.  She was placed on Rocephin and discharged to a skilled nursing facility.  Dr. Chung took her back to the OR today for another I and D and placement of a wound VAC.  The wound was not healing properly and the sutures had become undone.      The patient was otherwise doing well at the skilled nursing facility.  She has not had any fever or chills.  She has been walking with assistance.  She has not had any falls.    Overview/Hospital Course:  Patient monitor closely during hospitalization.  Patient underwent I&D of her right knee with wound cultures obtained per Dr. Chung on 09/12/2019.  Wound VAC was placed during surgery.  She was initiated on IV antibiotics.  Id consulted for IV antibiotic management.  She was noted to have significant peripheral edema and was initiated on Lasix IV 40 mg daily with significant improvement of edema. PT consulted.  She is able to ambulate in martins using walker.    Interval History:  Patient is doing well.  Afebrile.  Knee pain stable.  Patient states her children does not want her to go home at this time therefore she will agree to go to skilled nursing facility.     Review of Systems   Constitutional: Positive for activity change and fatigue. Negative for diaphoresis and fever.   Respiratory: Negative for cough, chest tightness, shortness of breath and wheezing.    Cardiovascular: Negative for chest pain, palpitations and leg  swelling.   Gastrointestinal: Negative for abdominal distention, abdominal pain, constipation, diarrhea, nausea and vomiting.   Genitourinary: Negative for difficulty urinating, dysuria, flank pain and hematuria.   Musculoskeletal: Positive for arthralgias. Negative for gait problem, joint swelling and neck pain.        Knee pain   Skin: Negative for rash and wound.   Neurological: Negative for dizziness, syncope, speech difficulty, light-headedness, numbness and headaches.   Psychiatric/Behavioral: Negative for agitation, behavioral problems and confusion.     Objective:     Vital Signs (Most Recent):  Temp: 96.1 °F (35.6 °C) (09/17/19 1100)  Pulse: 70 (09/17/19 1100)  Resp: 18 (09/17/19 1100)  BP: (!) 97/44 (09/17/19 1100)  SpO2: 96 % (09/17/19 1100) Vital Signs (24h Range):  Temp:  [96.1 °F (35.6 °C)-98.3 °F (36.8 °C)] 96.1 °F (35.6 °C)  Pulse:  [70-77] 70  Resp:  [16-18] 18  SpO2:  [94 %-97 %] 96 %  BP: ()/(44-59) 97/44     Weight: 127 kg (280 lb)  Body mass index is 51.21 kg/m².    Intake/Output Summary (Last 24 hours) at 9/17/2019 1541  Last data filed at 9/17/2019 0621  Gross per 24 hour   Intake 1080 ml   Output --   Net 1080 ml      Physical Exam   Constitutional: She is oriented to person, place, and time. She appears well-developed and well-nourished.   HENT:   Head: Normocephalic and atraumatic.   Right Ear: External ear normal.   Left Ear: External ear normal.   Mouth/Throat: Oropharynx is clear and moist.   Eyes: Pupils are equal, round, and reactive to light. Conjunctivae and EOM are normal.   Neck: Normal range of motion. Neck supple. No JVD present.   Cardiovascular: Normal rate, regular rhythm, normal heart sounds and intact distal pulses.   No murmur heard.  +2  pitting edema to right lower extremity +1 to right lower extremity.   Pulmonary/Chest: Effort normal and breath sounds normal. No respiratory distress. She has no wheezes.   Abdominal: Soft. Bowel sounds are normal. She exhibits no  distension. There is no tenderness. There is no guarding.   Grossly protuberant   Musculoskeletal: Normal range of motion. She exhibits no edema or tenderness.   Wound VAC over the right knee joint   Neurological: She is alert and oriented to person, place, and time. No cranial nerve deficit. Coordination normal.   Skin: Skin is warm and dry. Capillary refill takes less than 2 seconds. No rash noted.   Wound VAC in place to right knee.  Chronic skin changes bilaterally.   Psychiatric: She has a normal mood and affect. Her behavior is normal. Judgment and thought content normal.   Nursing note and vitals reviewed.      Significant Labs:   CBC:   Recent Labs   Lab 09/16/19  0505   WBC 8.67   HGB 8.9*   HCT 29.1*   *     CMP:   Recent Labs   Lab 09/16/19  0505 09/16/19  0508   *  --    K 3.6  --    CL 98  --    CO2 27  --    GLU 97  --    BUN 15  --    CREATININE 1.2  --    CALCIUM 8.7  --    ALBUMIN  --  2.7*   ANIONGAP 10  --    EGFRNONAA 48*  --        Significant Imaging: None      Assessment/Plan:      * Infection of right knee  The patient had another I and D of the right knee on 09/12/2019 and a wound VAC was placed.  Continue IV antibiotics as per Infectious Disease recommendations.    Acute diastolic heart failure  Oral Lasix 20 mg daily. Monitor I/O.  2D echocardiogram results reviewed.      Hyperglycemia  Check hemoglobin A1c.  Add Accu-Cheks and insulin as needed.      Hyponatremia  Slightly decreased.  Monitor BMP.      Chronic kidney disease, stage III (moderate)  Chronic.  At baseline renal function.  Continue to monitor renal function      Surgical wound dehiscence, subsequent encounter  Wound Care consulted.  Wound VAC in place      Thrombocytosis  This is probably reactive thrombocytosis      Normocytic anemia  The patient has been anemic for several months and her hemoglobin is at baseline  Check iron studies-Fe sat 5%. Add venofer x 1 dose and po fergon  Add additional  Venofer    Obesity, Class III, BMI 40-49.9 (morbid obesity)  Body mass index is 51.21 kg/m².  General weight loss/lifestyle modification strategies discussed (elicit support from others; identify saboteurs; non-food rewards, etc).        Essential hypertension  Her blood pressure is on the low side right now therefore I will hold her antihypertensives      Mild asthma without complication  Stable.  No evidence of exacerbation.     Discussed with patient,  and .  [facility] did not accept patient.   finding new facility.  Will request long-term acute care facility placement.    VTE Risk Mitigation (From admission, onward)        Ordered     IP VTE HIGH RISK PATIENT  Once      09/12/19 1719     Place sequential compression device  Until discontinued      09/12/19 1719     Place KAROLINA hose  Until discontinued      09/12/19 1719                Yazmin Jules MD  Department of Hospital Medicine   [facility]

## 2019-09-17 NOTE — PLAN OF CARE
Notified Ana at Dr. Moreno's office at [facility], [phone] that pt is in the hospital and will not be able to go to her appointment tomorrow, 9-18-19 @ 11:30a.m.  Plan to reschedule when pt is out of the hospital.  Updated pt.      09/17/19 120   Discharge Reassessment   Assessment Type Discharge Planning Reassessment

## 2019-09-17 NOTE — PLAN OF CARE
Problem: Adult Inpatient Plan of Care  Goal: Plan of Care Review  Outcome: Revised  Pt is AAOx4.  Pt c/o pain, prn medication given, pt tolerated well.  Contact precautions stated this shift per MD orders.  Pt is up with assistance to BR as needed.  Wound vac with serosanguinous fluid, minimal output, at 125 mm Hg.  SILVA PICC in use, infusing abx as ordered, no redness or swelling at site.  VSS, in NAD, pt remains afebrile.  Pt remains free from injury.  Bed in low position, wheels locked, call light within reach.  Pt verbalized understanding of POC.  Hourly/ Q 2 hr rounding performed.  Open communication facilitated.

## 2019-09-17 NOTE — PLAN OF CARE
Per Dr. Jules pt does not want to go back home and will return to SNF as long as she has a private room.  Pt will return with the wound vac, oral levaquin and IV rocephin until her wound heals.  Spoke to Britney at [facility], [phone] who verified that they had a private isolation room and will be able to admit pt tomorrow pending auth.  Britney requested that I fax her a MAR and measurements for the wound vac pads and dressings. Updated pt who is in agreement.  Updated Dr. Jules that plan is for discharge tomorrow pending auth.      09/17/19 1843   Discharge Reassessment   Assessment Type Discharge Planning Reassessment   Discharge Plan A Skilled Nursing Facility   .

## 2019-09-17 NOTE — PLAN OF CARE
Problem: Adult Inpatient Plan of Care  Goal: Plan of Care Review  Patient awake, alert, and oriented x 4. Patient currently free from injury. Patient receiving antibiotic therapy via IV for infection in right knee. Patient's pain has been under control today. Patient ambulated in the martins with physical therapy. Patient's BP has been stable today. Wound vac dressing changed today.

## 2019-09-17 NOTE — PT/OT/SLP PROGRESS
Physical Therapy Treatment    Patient Name:  Iris Mueller   MRN:  [id number]    Recommendations:     Discharge Recommendations:  home with home health, nursing facility, skilled   Discharge Equipment Recommendations: none   Barriers to discharge: None    Assessment:     Iris Mueller is a 63 y.o. female admitted with a medical diagnosis of Infection of right knee.  She presents with the following impairments/functional limitations:  weakness, impaired endurance, gait instability, impaired balance, impaired functional mobilty .  Patient presented sitting in chair in room. Transferred sit to stand with supervision and ambulated x 250 feet with RW with CGA and very slow gait pattern.    Rehab Prognosis: Good; patient would benefit from acute skilled PT services to address these deficits and reach maximum level of function.    Recent Surgery: Procedure(s) (LRB):  INCISION AND DRAINAGE, LOWER EXTREMITY (Right)  REPLACEMENT, WOUND VAC (Right) 5 Days Post-Op    Plan:     During this hospitalization, patient to be seen daily to address the identified rehab impairments via gait training, therapeutic activities, therapeutic exercises and progress toward the following goals:    · Plan of Care Expires:  09/26/19    Subjective     Chief Complaint: none  Patient/Family Comments/goals: go home  Pain/Comfort:  ·        Objective:     Communicated with nurse prior to session.  Patient found up in chair with wound vac upon PT entry to room.     General Precautions: Standard, fall, contact   Orthopedic Precautions:RLE weight bearing as tolerated   Braces:       Functional Mobility:  · Transfers:     · Sit to Stand:  supervision with rolling walker  · Gait: x 250 feet with RW with CGA      AM-PAC 6 CLICK MOBILITY  Turning over in bed (including adjusting bedclothes, sheets and blankets)?: 4  Sitting down on and standing up from a chair with arms (e.g., wheelchair, bedside commode, etc.): 4  Moving from lying on  back to sitting on the side of the bed?: 4  Moving to and from a bed to a chair (including a wheelchair)?: 3  Need to walk in hospital room?: 3  Climbing 3-5 steps with a railing?: 1  Basic Mobility Total Score: 19       Therapeutic Activities and Exercises:    Patient left up in chair with call button in reach and family present..    GOALS:   Multidisciplinary Problems     Physical Therapy Goals        Problem: Physical Therapy Goal    Goal Priority Disciplines Outcome Goal Variances Interventions   Physical Therapy Goal     PT, PT/OT Ongoing (interventions implemented as appropriate)     Description:  Goals to be met by: 2019     Patient will increase functional independence with mobility by performin. Supine to sit with Modified [place]  2. Sit to supine with Modified [place]  3. Sit to stand transfer with Contact Guard Assistance  4. Bed to chair transfer with Contact Guard Assistance using Rolling Walker  5. Gait  x 250 feet with Contact Guard Assistance using Rolling Walker.   6. Lower extremity exercise program x10-20 reps per handout, with independence                      Time Tracking:     PT Received On: 19  PT Start Time: 1343     PT Stop Time: 1355  PT Total Time (min): 12 min     Billable Minutes: Gait Training 12    Treatment Type: Treatment  PT/PTA: PT     PTA Visit Number: 3     Chris Megilligan, PT  2019

## 2019-09-17 NOTE — PLAN OF CARE
09/16/19 1945   Patient Assessment/Suction   Level of Consciousness (AVPU) alert   Respiratory Effort Normal;Unlabored   Rhythm/Pattern, Respiratory pattern regular   PRE-TX-O2   O2 Device (Oxygen Therapy) room air   SpO2 97 %   Pulse Oximetry Type Intermittent   Aerosol Therapy   $ Aerosol Therapy Charges PRN treatment not required

## 2019-09-18 VITALS
DIASTOLIC BLOOD PRESSURE: 62 MMHG | HEART RATE: 73 BPM | HEIGHT: 62 IN | OXYGEN SATURATION: 98 % | RESPIRATION RATE: 18 BRPM | TEMPERATURE: 98 F | SYSTOLIC BLOOD PRESSURE: 104 MMHG | BODY MASS INDEX: 51.53 KG/M2 | WEIGHT: 280 LBS

## 2019-09-18 PROBLEM — D64.9 ANEMIA: Status: ACTIVE | Noted: 2019-09-18

## 2019-09-18 LAB
ANION GAP SERPL CALC-SCNC: 8 MMOL/L (ref 8–16)
BASOPHILS # BLD AUTO: 0.06 K/UL (ref 0–0.2)
BASOPHILS NFR BLD: 0.7 % (ref 0–1.9)
BUN SERPL-MCNC: 18 MG/DL (ref 8–23)
CALCIUM SERPL-MCNC: 9.1 MG/DL (ref 8.7–10.5)
CHLORIDE SERPL-SCNC: 100 MMOL/L (ref 95–110)
CO2 SERPL-SCNC: 26 MMOL/L (ref 23–29)
CREAT SERPL-MCNC: 1.1 MG/DL (ref 0.5–1.4)
DIFFERENTIAL METHOD: ABNORMAL
EOSINOPHIL # BLD AUTO: 0.3 K/UL (ref 0–0.5)
EOSINOPHIL NFR BLD: 3 % (ref 0–8)
ERYTHROCYTE [DISTWIDTH] IN BLOOD BY AUTOMATED COUNT: 16 % (ref 11.5–14.5)
EST. GFR  (AFRICAN AMERICAN): >60 ML/MIN/1.73 M^2
EST. GFR  (NON AFRICAN AMERICAN): 53 ML/MIN/1.73 M^2
GLUCOSE SERPL-MCNC: 97 MG/DL (ref 70–110)
HCT VFR BLD AUTO: 28.8 % (ref 37–48.5)
HGB BLD-MCNC: 8.6 G/DL (ref 12–16)
IMM GRANULOCYTES # BLD AUTO: 0.1 K/UL (ref 0–0.04)
LYMPHOCYTES # BLD AUTO: 1.5 K/UL (ref 1–4.8)
LYMPHOCYTES NFR BLD: 17.7 % (ref 18–48)
MCH RBC QN AUTO: 29.5 PG (ref 27–31)
MCHC RBC AUTO-ENTMCNC: 29.9 G/DL (ref 32–36)
MCV RBC AUTO: 99 FL (ref 82–98)
MONOCYTES # BLD AUTO: 0.7 K/UL (ref 0.3–1)
MONOCYTES NFR BLD: 8.5 % (ref 4–15)
NEUTROPHILS # BLD AUTO: 5.8 K/UL (ref 1.8–7.7)
NEUTROPHILS NFR BLD: 68.9 % (ref 38–73)
NRBC BLD-RTO: 0 /100 WBC
PLATELET # BLD AUTO: 425 K/UL (ref 150–350)
PMV BLD AUTO: 8.8 FL (ref 9.2–12.9)
POTASSIUM SERPL-SCNC: 4.5 MMOL/L (ref 3.5–5.1)
RBC # BLD AUTO: 2.92 M/UL (ref 4–5.4)
SODIUM SERPL-SCNC: 134 MMOL/L (ref 136–145)
WBC # BLD AUTO: 8.44 K/UL (ref 3.9–12.7)

## 2019-09-18 PROCEDURE — 25000003 PHARM REV CODE 250: Performed by: INTERNAL MEDICINE

## 2019-09-18 PROCEDURE — 94761 N-INVAS EAR/PLS OXIMETRY MLT: CPT

## 2019-09-18 PROCEDURE — 99900035 HC TECH TIME PER 15 MIN (STAT)

## 2019-09-18 PROCEDURE — 80048 BASIC METABOLIC PNL TOTAL CA: CPT

## 2019-09-18 PROCEDURE — 90662 IIV NO PRSV INCREASED AG IM: CPT | Performed by: INTERNAL MEDICINE

## 2019-09-18 PROCEDURE — 36415 COLL VENOUS BLD VENIPUNCTURE: CPT

## 2019-09-18 PROCEDURE — 25000003 PHARM REV CODE 250: Performed by: ORTHOPAEDIC SURGERY

## 2019-09-18 PROCEDURE — 90472 IMMUNIZATION ADMIN EACH ADD: CPT | Performed by: INTERNAL MEDICINE

## 2019-09-18 PROCEDURE — 90471 IMMUNIZATION ADMIN: CPT | Performed by: INTERNAL MEDICINE

## 2019-09-18 PROCEDURE — G0008 ADMIN INFLUENZA VIRUS VAC: HCPCS | Performed by: INTERNAL MEDICINE

## 2019-09-18 PROCEDURE — 63600175 PHARM REV CODE 636 W HCPCS: Performed by: INTERNAL MEDICINE

## 2019-09-18 PROCEDURE — 97116 GAIT TRAINING THERAPY: CPT

## 2019-09-18 PROCEDURE — 85025 COMPLETE CBC W/AUTO DIFF WBC: CPT

## 2019-09-18 PROCEDURE — 25000003 PHARM REV CODE 250: Performed by: NURSE PRACTITIONER

## 2019-09-18 PROCEDURE — 90715 TDAP VACCINE 7 YRS/> IM: CPT | Performed by: INTERNAL MEDICINE

## 2019-09-18 RX ORDER — DOXYLAMINE SUCCINATE 25 MG
TABLET ORAL 2 TIMES DAILY
Refills: 0 | Status: ON HOLD | COMMUNITY
Start: 2019-09-18 | End: 2019-10-07 | Stop reason: HOSPADM

## 2019-09-18 RX ORDER — FLUCONAZOLE 200 MG/1
200 TABLET ORAL DAILY
Qty: 30 TABLET | Refills: 0 | Status: ON HOLD | OUTPATIENT
Start: 2019-09-18 | End: 2019-10-07 | Stop reason: HOSPADM

## 2019-09-18 RX ORDER — LEVOFLOXACIN 500 MG/1
500 TABLET, FILM COATED ORAL DAILY
Status: ON HOLD
Start: 2019-09-19 | End: 2019-10-07 | Stop reason: SDUPTHER

## 2019-09-18 RX ORDER — HYDROCODONE BITARTRATE AND ACETAMINOPHEN 10; 325 MG/1; MG/1
1 TABLET ORAL EVERY 6 HOURS PRN
Refills: 0 | Status: ON HOLD
Start: 2019-09-18 | End: 2019-10-07 | Stop reason: SDUPTHER

## 2019-09-18 RX ADMIN — HYDROCODONE BITARTRATE AND ACETAMINOPHEN 1 TABLET: 10; 325 TABLET ORAL at 05:09

## 2019-09-18 RX ADMIN — SODIUM CHLORIDE 500 ML: 0.9 INJECTION, SOLUTION INTRAVENOUS at 09:09

## 2019-09-18 RX ADMIN — HYDROCODONE BITARTRATE AND ACETAMINOPHEN 1 TABLET: 10; 325 TABLET ORAL at 12:09

## 2019-09-18 RX ADMIN — GABAPENTIN 100 MG: 100 CAPSULE ORAL at 09:09

## 2019-09-18 RX ADMIN — FLUOXETINE 40 MG: 20 CAPSULE ORAL at 09:09

## 2019-09-18 RX ADMIN — BUPROPION HYDROCHLORIDE 75 MG: 75 TABLET, FILM COATED ORAL at 09:09

## 2019-09-18 RX ADMIN — INFLUENZA A VIRUS A/MICHIGAN/45/2015 X-275 (H1N1) ANTIGEN (FORMALDEHYDE INACTIVATED), INFLUENZA A VIRUS A/SINGAPORE/INFIMH-16-0019/2016 IVR-186 (H3N2) ANTIGEN (FORMALDEHYDE INACTIVATED), AND INFLUENZA B VIRUS B/MARYLAND/15/2016 BX-69A (A B/COLORADO/6/2017-LIKE VIRUS) ANTIGEN (FORMALDEHYDE INACTIVATED) 0.5 ML: 60; 60; 60 INJECTION, SUSPENSION INTRAMUSCULAR at 01:09

## 2019-09-18 RX ADMIN — FERROUS SULFATE TAB EC 325 MG (65 MG FE EQUIVALENT) 325 MG: 325 (65 FE) TABLET DELAYED RESPONSE at 09:09

## 2019-09-18 RX ADMIN — PANTOPRAZOLE SODIUM 40 MG: 40 TABLET, DELAYED RELEASE ORAL at 09:09

## 2019-09-18 RX ADMIN — LEVOFLOXACIN 500 MG: 500 TABLET, FILM COATED ORAL at 09:09

## 2019-09-18 RX ADMIN — CLOSTRIDIUM TETANI TOXOID ANTIGEN (FORMALDEHYDE INACTIVATED), CORYNEBACTERIUM DIPHTHERIAE TOXOID ANTIGEN (FORMALDEHYDE INACTIVATED), BORDETELLA PERTUSSIS TOXOID ANTIGEN (GLUTARALDEHYDE INACTIVATED), BORDETELLA PERTUSSIS FILAMENTOUS HEMAGGLUTININ ANTIGEN (FORMALDEHYDE INACTIVATED), BORDETELLA PERTUSSIS PERTACTIN ANTIGEN, AND BORDETELLA PERTUSSIS FIMBRIAE 2/3 ANTIGEN 0.5 ML: 5; 2; 2.5; 5; 3; 5 INJECTION, SUSPENSION INTRAMUSCULAR at 01:09

## 2019-09-18 RX ADMIN — HYDROCODONE BITARTRATE AND ACETAMINOPHEN 1 TABLET: 10; 325 TABLET ORAL at 01:09

## 2019-09-18 NOTE — PLAN OF CARE
Problem: Adult Inpatient Plan of Care  Goal: Plan of Care Review  Outcome: Revised  Pt is AAOx4.  Pt c/o pain, prn medication given, pt tolerated well.  SILVA PICC saline locked, no redness or swelling at site, infusing abx as ordered.  Wound vac in use to RLE, vacuum therapy at 125 mm Hg.  Pt is up with assistance to BR as needed.  VSS, in NAD, pt remains afebrile.  Pt remains free from injury.  Bed in low position, wheels locked, call light within reach.  Pt verbalized understanding of POC.  Hourly/ Q 2 hr rounding performed.  Open communication facilitated.

## 2019-09-18 NOTE — PLAN OF CARE
Problem: Physical Therapy Goal  Goal: Physical Therapy Goal  Goals to be met by: 2019     Patient will increase functional independence with mobility by performin. Supine to sit with Modified [place]  2. Sit to supine with Modified [place]  3. Sit to stand transfer with Contact Guard Assistance  4. Bed to chair transfer with Contact Guard Assistance using Rolling Walker  5. Gait  x 250 feet with Contact Guard Assistance using Rolling Walker.   6. Lower extremity exercise program x10-20 reps per handout, with independence     Outcome: Ongoing (interventions implemented as appropriate)  Gait of 300' RW SBA with minimal VC to improve functional mobility and independence.

## 2019-09-18 NOTE — NURSING
D/C instructions given and explained . Medications reviewed and discussed. Wound vac machine removed, wound vac sealed dressing intact. SILVA PICC line in place and clamped. All questions answered. Patient verbalized understanding. Pt left floor via w/c with gualberto Stewart. No distress noted. C/o 5/10 pain. Pt transported to LTAC with sister and niece.

## 2019-09-18 NOTE — SUBJECTIVE & OBJECTIVE
Interval History:  Patient is afebrile.  Feeling weak.  Blood pressure is low this morning.  No chest pain or shortness of breath.  Knee pain is controlled.  Patient is participating with physical therapy and patiently waiting for skilled nursing facility/LTAC placement.    Review of Systems   Constitutional: Positive for activity change and fatigue. Negative for diaphoresis and fever.   Respiratory: Negative for cough, chest tightness, shortness of breath and wheezing.    Cardiovascular: Negative for chest pain, palpitations and leg swelling.   Gastrointestinal: Negative for abdominal distention, abdominal pain, constipation, diarrhea, nausea and vomiting.   Genitourinary: Negative for difficulty urinating, dysuria, flank pain and hematuria.   Musculoskeletal: Positive for arthralgias. Negative for gait problem, joint swelling and neck pain.        Knee pain   Skin: Negative for rash and wound.   Neurological: Negative for dizziness, syncope, speech difficulty, light-headedness, numbness and headaches.   Psychiatric/Behavioral: Negative for agitation, behavioral problems and confusion.     Objective:     Vital Signs (Most Recent):  Temp: 96.3 °F (35.7 °C) (09/18/19 0457)  Pulse: 72 (09/18/19 0457)  Resp: 16 (09/18/19 0457)  BP: (!) 82/57 (09/18/19 0557)  SpO2: 95 % (09/18/19 0457) Vital Signs (24h Range):  Temp:  [96.1 °F (35.6 °C)-97.4 °F (36.3 °C)] 96.3 °F (35.7 °C)  Pulse:  [70-75] 72  Resp:  [16-18] 16  SpO2:  [94 %-97 %] 95 %  BP: (80-97)/(44-57) 82/57     Weight: 127 kg (280 lb)  Body mass index is 51.21 kg/m².    Intake/Output Summary (Last 24 hours) at 9/18/2019 0843  Last data filed at 9/18/2019 0600  Gross per 24 hour   Intake 1130 ml   Output 1 ml   Net 1129 ml      Physical Exam   Constitutional: She is oriented to person, place, and time. She appears well-developed and well-nourished.   HENT:   Head: Normocephalic and atraumatic.   Right Ear: External ear normal.   Left Ear: External ear normal.    Mouth/Throat: Oropharynx is clear and moist.   Eyes: Pupils are equal, round, and reactive to light. Conjunctivae and EOM are normal.   Neck: Normal range of motion. Neck supple. No JVD present.   Cardiovascular: Normal rate, regular rhythm, normal heart sounds and intact distal pulses.   No murmur heard.  +2  pitting edema to right lower extremity +1 to right lower extremity.   Pulmonary/Chest: Effort normal and breath sounds normal. No respiratory distress. She has no wheezes.   Abdominal: Soft. Bowel sounds are normal. She exhibits no distension. There is no tenderness. There is no guarding.   Grossly protuberant   Musculoskeletal: Normal range of motion. She exhibits no edema or tenderness.   Wound VAC over the right knee joint   Neurological: She is alert and oriented to person, place, and time. No cranial nerve deficit. Coordination normal.   Skin: Skin is warm and dry. Capillary refill takes less than 2 seconds. No rash noted.   Wound VAC in place to right knee.  Chronic skin changes bilaterally.   Psychiatric: She has a normal mood and affect. Her behavior is normal. Judgment and thought content normal.   Nursing note and vitals reviewed.      Significant Labs:   CBC:   Recent Labs   Lab 09/18/19  0550   WBC 8.44   HGB 8.6*   HCT 28.8*   *     CMP:   Recent Labs   Lab 09/18/19  0550   *   K 4.5      CO2 26   GLU 97   BUN 18   CREATININE 1.1   CALCIUM 9.1   ANIONGAP 8   EGFRNONAA 53*       Significant Imaging: CXR: PICC line tip is near the junction of the right subclavian vein and IJV.    ECHO:  · Concentric left ventricular remodeling.  · Normal left ventricular systolic function. The estimated ejection fraction is 60%  · Normal LV diastolic function.  · Mild aortic regurgitation.  · Elevated central venous pressure (15 mm Hg).  · The estimated PA systolic pressure is 32 mm Hg

## 2019-09-18 NOTE — DISCHARGE SUMMARY
[facility] Hospital Medicine  Discharge Summary      Patient Name: Iris Mueller  MRN: [id number]  Admission Date: 9/12/2019  Hospital Length of Stay: 6 days  Discharge Date and Time:  09/18/2019 4:23 PM  Attending Physician: No att. providers found   Discharging Provider: Yazmin Jules MD  Primary Care Provider: Elkin You MD      HPI:   The patient is a 63-year-old woman who underwent a right total knee arthroplasty August 14th of this year.  She was admitted August 30th with infection in the right knee and had an I and D done.  She was placed on Rocephin and discharged to a skilled nursing facility.  Dr. Chung took her back to the OR today for another I and D and placement of a wound VAC.  The wound was not healing properly and the sutures had become undone. The patient was otherwise doing well at the skilled nursing facility.  She has not had any fever or chills.  She has been walking with assistance.  She has not had any falls.    Procedure(s) (LRB):  INCISION AND DRAINAGE, LOWER EXTREMITY (Right)  REPLACEMENT, WOUND VAC (Right)      Hospital Course:   Patient monitored closely during hospitalization.  Patient underwent I&D of her right knee with wound cultures obtained per Dr. Chung on 09/12/2019.  Wound VAC was placed during surgery.  She was initiated on IV antibiotics.  ID consulted for IV antibiotic management. She is able to ambulate in martins using walker.  Wound cultures grew Proteus mirabilis and Stephotrophomonas species.  Wound care continued.  Later patient was accepted at [facility] long-term acute care facility.  Patient is expected to follow up with Dr. Collins with Plastic surgery at [facility] soon.  Patient is being discharged to LTAC with following discharge plan of care.     Consults:   Consults (From admission, onward)        Status Ordering Provider     Inpatient consult to Infectious Diseases  Once     Provider:  Anastasia Sanchez  MD Yousuf    Completed LOUISE GUIDRY     Inpatient consult to Social Work/Case Management  Once     Provider:  (Not yet assigned)    Completed MURRAY MARTINEZ     Inpatient consult to Social Work/Case Management  Once     Provider:  (Not yet assigned)    Completed MURRAY MARTINEZ        Final Active Diagnoses:    Diagnosis Date Noted POA    PRINCIPAL PROBLEM:  Infection of right knee [M00.9] 09/12/2019 Yes    Acute diastolic heart failure [I50.31] 09/14/2019 Yes    Surgical wound dehiscence, subsequent encounter [T81.31XD] 09/13/2019 Not Applicable    Chronic kidney disease, stage III (moderate) [N18.3] 09/13/2019 Yes    Hyponatremia [E87.1] 09/13/2019 Yes    Hyperglycemia [R73.9] 09/13/2019 Yes    Normocytic anemia [D64.9] 09/12/2019 Yes    Thrombocytosis [D47.3] 09/12/2019 Yes    Essential hypertension [I10] 08/14/2019 Yes    Mild asthma without complication [J45.909] 08/14/2019 Yes    Obesity, Class III, BMI 40-49.9 (morbid obesity) [E66.01] 08/14/2019 Yes      Problems Resolved During this Admission:       Discharged Condition: good    Disposition: Long Term Care    Follow Up:  Follow-up Information     [facility].    Contact information:  [address]  [facility] [address]  [phone]             Elkin You MD.    Specialty:  Family Medicine  Why:  Upon discharge  Contact information:  [address]  [address]  [place] [place] [address]  [phone]                 Patient Instructions:      Diet Cardiac   Order Comments: Patient to monitor daily weight, follow low salt diet and in case if gain more than 3 pounds in 24 hours, please call your doctor for further advice.     Other restrictions (specify):   Order Comments: PLEASE OBSERVE FALL PRECAUTIONS     Call MD for:   Order Comments: For worsening symptoms, chest pain, shortness of breath, increased abdominal pain, high grade fever, stroke or stroke like symptoms, immediately go to the  nearest Emergency Room or call 911 as soon as possible.     Change dressing (specify)   Order Comments: Dressing change:  Wound VAC care per protocol       Significant Diagnostic Studies: Labs:   CMP   Recent Labs   Lab 09/18/19  0550   *   K 4.5      CO2 26   GLU 97   BUN 18   CREATININE 1.1   CALCIUM 9.1   ANIONGAP 8   ESTGFRAFRICA >60   EGFRNONAA 53*    and CBC   Recent Labs   Lab 09/18/19  0550   WBC 8.44   HGB 8.6*   HCT 28.8*   *     CXR: PICC line tip is near the junction of the right subclavian vein and IJV.     ECHO:  · Concentric left ventricular remodeling.  · Normal left ventricular systolic function. The estimated ejection fraction is 60%  · Normal LV diastolic function.  · Mild aortic regurgitation.  · Elevated central venous pressure (15 mm Hg).  · The estimated PA systolic pressure is 32 mm Hg    Pending Diagnostic Studies:     None         Medications:  Reconciled Home Medications:      Medication List      START taking these medications    fluconazole 200 MG Tab  Commonly known as:  DIFLUCAN  Take 1 tablet (200 mg total) by mouth once daily.     HYDROcodone-acetaminophen  mg per tablet  Commonly known as:  NORCO  Take 1 tablet by mouth every 6 (six) hours as needed for Pain.     levoFLOXacin 500 MG tablet  Commonly known as:  LEVAQUIN  Take 1 tablet (500 mg total) by mouth once daily.  Start taking on:  9/19/2019     miconazole 2 % cream  Commonly known as:  MICOTIN  Apply topically 2 (two) times daily. Apply to vagina, perineum        CHANGE how you take these medications    aspirin 325 MG tablet  Take 1 tablet (325 mg total) by mouth 2 (two) times daily.  What changed:  Another medication with the same name was removed. Continue taking this medication, and follow the directions you see here.        CONTINUE taking these medications    ANORO ELLIPTA 62.5-25 mcg/actuation Dsdv  Generic drug:  umeclidinium-vilanterol  Inhale 1 puff into the lungs daily as needed (SOB).      buPROPion 75 MG tablet  Commonly known as:  WELLBUTRIN  Take 1 tablet (75 mg total) by mouth 2 (two) times daily.     ferrous sulfate 325 mg (65 mg iron) Tab tablet  Commonly known as:  FEOSOL  Take 325 mg by mouth twice a week. Mon Thurs     FLUoxetine 40 MG capsule  Take 40 mg by mouth once daily.     fluticasone propionate 50 mcg/actuation nasal spray  Commonly known as:  FLONASE  1 spray by Each Nostril route every evening.     gabapentin 100 MG capsule  Commonly known as:  NEURONTIN  Take 100 mg by mouth 3 (three) times daily.     lactobacillus acidophilus & bulgar 100 million cell packet  Commonly known as:  LACTINEX  Take 1 packet (1 each total) by mouth 2 (two) times daily. for 15 days     lovastatin 20 MG tablet  Commonly known as:  MEVACOR  Take 20 mg by mouth every evening.     oxyCODONE-acetaminophen  mg per tablet  Commonly known as:  PERCOCET  Take 1 tablet by mouth every 6 (six) hours as needed for Pain.     pantoprazole 40 MG tablet  Commonly known as:  PROTONIX  Take 1 tablet (40 mg total) by mouth once daily.     traZODone 50 MG tablet  Commonly known as:  DESYREL  Take 50 mg by mouth every evening.     VENTOLIN HFA 90 mcg/actuation inhaler  Generic drug:  albuterol  Inhale 1 puff into the lungs daily as needed for Wheezing or Shortness of Breath.     VITAMIN B-12 50 mcg tablet  Generic drug:  cyanocobalamin (vitamin B-12)  Take 50 mcg by mouth twice a week. Mon Thurs        STOP taking these medications    lisinopril-hydrochlorothiazide 20-25 mg Tab  Commonly known as:  PRINZIDE,ZESTORETIC        ASK your doctor about these medications    cefTRIAXone 2 g in dextrose 5 % 50 mL 2 g/50 mL Pgbk IVPB  Commonly known as:  ROCEPHIN  Inject 50 mLs (2 g total) into the vein once daily. for 7 days  Ask about: Should I take this medication?            Indwelling Lines/Drains at time of discharge:   Lines/Drains/Airways     Peripherally Inserted Central Catheter Line                 PICC Double Lumen  08/30/19 1257 right basilic 19 days          Drain            Female External Urinary Catheter 09/12/19 2000 5 days          Pressure Ulcer                 Negative Pressure Wound Therapy  6 days                Time spent on the discharge of patient: 32 minutes  Patient was seen and examined on the date of discharge and determined to be suitable for discharge.         Yazmin Jules MD  Department of Hospital Medicine  [facility]

## 2019-09-18 NOTE — PLAN OF CARE
Chiqui with [facility] called to say she will be reviewing the request for LTAC.      10:30 a.m. - per Chiqui at [facility] pt has been approved for [facility], auth # [id number].  Updated Dr. Jules and Susan at [facility], [phone].  Provided her with the auth number.      10:40 a.m. - Updated pt who wads on the phone speaking to her daughter Guadalupe.  Daughter verified that she could transport pt to LTAC in [place].  Updated Susan at [facility] and pt's nurse Yadira.     11:05 a.m. - pt provided signature for the disclosure form for LTAC.       09/18/19 0927   Discharge Reassessment   Assessment Type Discharge Planning Reassessment   Discharge Plan A Long-term acute care facility (LTAC)

## 2019-09-18 NOTE — PLAN OF CARE
09/18/19 1430   Final Note   Assessment Type Final Discharge Note   Anticipated Discharge Disposition LTAC  ([facility])

## 2019-09-18 NOTE — PLAN OF CARE
Faxed pt's AVS and discharge order to [facility]. Left message to update Susan,. Awaiting call back when okay to call report.  Updated pt's nurse Yadira.    Yadira verified that she had called report.  Pt's family in the room ready to transport pt.      09/18/19 7994   Post-Acute Status   Post-Acute Authorization Placement   Post-Acute Placement Status Authorization Obtained

## 2019-09-18 NOTE — PT/OT/SLP PROGRESS
Physical Therapy Treatment    Patient Name:  Iris Mueller   MRN:  [id number]    Recommendations:     Discharge Recommendations:  nursing facility, skilled, home with home health   Discharge Equipment Recommendations: none   Barriers to discharge: None    Assessment:     Iris Mueller is a 63 y.o. female admitted with a medical diagnosis of Infection of right knee.  She presents with the following impairments/functional limitations:  weakness, impaired endurance, impaired cardiopulmonary response to activity, decreased ROM, impaired functional mobilty . Pt pleasant and agreeable to therapy. Pt sit<>stand from b/s chair with supervision. Gait of 300' RW with SBA, minor VC needed to decrease WB on BUEs and stand erect. Pt reported she finds it difficult to keep her knee straight at all times.     Rehab Prognosis: Good; patient would benefit from acute skilled PT services to address these deficits and reach maximum level of function.    Recent Surgery: Procedure(s) (LRB):  INCISION AND DRAINAGE, LOWER EXTREMITY (Right)  REPLACEMENT, WOUND VAC (Right) 6 Days Post-Op    Plan:     During this hospitalization, patient to be seen daily to address the identified rehab impairments via gait training, therapeutic activities, therapeutic exercises and progress toward the following goals:    · Plan of Care Expires:  09/26/19    Subjective     Chief Complaint: Keeping that knee straight  Patient/Family Comments/goals: Pt wants to go home.   Pain/Comfort:  · Pain Rating 1: (Not rated. )  · Location - Side 1: Right  · Location 1: knee      Objective:     Communicated with nurse May prior to session.  Patient found up in chair with wound vac, peripheral IV upon PT entry to room.     General Precautions: Standard, contact, fall   Orthopedic Precautions:RLE weight bearing as tolerated   Braces: N/A     Functional Mobility:  · Transfers:     · Sit to Stand:  supervision with rolling walker  · Gait: 300' RW, SBA.  Wound vac/IV pole in tow.       AM-PAC 6 CLICK MOBILITY          Therapeutic Activities and Exercises:   VC needed to stand erect and decrease WB on BUEs.   Pt endurance is improving.     Patient left up in chair with all lines intact, call button in reach and nurse Yadira notified..    GOALS:   Multidisciplinary Problems     Physical Therapy Goals        Problem: Physical Therapy Goal    Goal Priority Disciplines Outcome Goal Variances Interventions   Physical Therapy Goal     PT, PT/OT Ongoing (interventions implemented as appropriate)     Description:  Goals to be met by: 2019     Patient will increase functional independence with mobility by performin. Supine to sit with Modified [place]  2. Sit to supine with Modified [place]  3. Sit to stand transfer with Contact Guard Assistance  4. Bed to chair transfer with Contact Guard Assistance using Rolling Walker  5. Gait  x 250 feet with Contact Guard Assistance using Rolling Walker.   6. Lower extremity exercise program x10-20 reps per handout, with independence                      Time Tracking:     PT Received On: 19  PT Start Time: 939     PT Stop Time: 954  PT Total Time (min): 15 min     Billable Minutes: Gait Training 15 minutes    Treatment Type: Treatment  PT/PTA: PTA     PTA Visit Number: 1     Rema Solano, PTA  2019

## 2019-09-19 PROBLEM — E66.01 MORBID OBESITY: Status: ACTIVE | Noted: 2019-09-19

## 2019-09-19 NOTE — PHYSICIAN QUERY
"PT Name: Iris Mueller  MR #: [id number]     Physician Query Form - Diagnosis Clarification      CDS: Melissa Patten RN, CCDS         Contact information :[phone]  [email or web address]       This form is a permanent document in the medical record.     Query Date: September 19, 2019    By submitting this query, we are merely seeking further clarification of documentation.  Please utilize your independent clinical judgment when addressing the question(s) below.     The medical record contains the following:      Findings Supporting Clinical Information Location in Medical Record       "Acute diastolic heart failure  Possible. Add lasix IV 40 mg daily. Monitor I/O. Check echo to evaluate LV function."                     Past Medical history:  Hypertension  Current Outpatient Meds per H&P   lisinopril-hydrochlorothiazide (PRINZIDE,ZESTORETIC) 20-25 mg Tab    "Lungs are clear.  Unchanged heart size.  Atherosclerosis.  Right PICC line with tip in the region of the right SC joint.  No pleural effusion or pneumothorax.  There is pulmonary vascular congestion."    "Normal left ventricular systolic function. The estimated ejection fraction is 60%  Normal LV diastolic function"    "She was noted to have significant peripheral edema and was initiated on Lasix IV 40 mg daily with significant improvement of edema."      "STOP taking these medications  lisinopril-hydrochlorothiazide 20-25 mg Tab"         Hospital Medicine PN 9/15/19          H&P 9/12/19            CXR 9/14/19            Echo 9/14/19        Hospital Medicine PN 9/17/19        Discharge summary 9/18/19         Please clarify if the _Acute diastolic heart failure__ diagnosis has been:    [ x ] Ruled In   [  ] Ruled Out   [  ] Other/Clarification of findings (please specify):_______       [  ] Clinically undetermined     Please document in your progress notes daily for the duration of treatment, until resolved, and include in your discharge " summary.

## 2019-09-23 ENCOUNTER — TELEPHONE (OUTPATIENT)
Dept: ORTHOPEDICS | Facility: CLINIC | Age: 64
End: 2019-09-23

## 2019-09-23 NOTE — TELEPHONE ENCOUNTER
----- Message from Iliana Crowe MA sent at 9/23/2019 10:02 AM CDT -----  Contact: Dr Singh, [facility] hosp   Wants to speak to Dr Chung   Call back

## 2019-09-23 NOTE — TELEPHONE ENCOUNTER
Returned Dr. Singh's call, he wanted to know if patient needed to be on Lovenox injections or ASA 325mg BID as this was not continued from hospital.  She is in SCD while not ambulating.  Per Dr. Chung from her TKA standpoint this does not need to be continued.  He also wanted to know when she needed to follow up with our office and with Dr. Moreno.  I advised that we need to see her this week and get her into Dr. Moreno ASAP.  He will have his nurse contact me to coordinate appointments.

## 2019-09-24 ENCOUNTER — TELEPHONE (OUTPATIENT)
Dept: ORTHOPEDICS | Facility: CLINIC | Age: 64
End: 2019-09-24

## 2019-09-24 NOTE — TELEPHONE ENCOUNTER
----- Message from Aki Patel sent at 9/24/2019 10:03 AM CDT -----  Type: Needs Medical Advice    Who Called:  Alexis[facility] Call Back Number: [phone]  Additional Information: Caller states that he would like a callback regarding confirmation of the patient's appointment 09/30.  Caller states that the patient informed him that the appointment was 09/27.

## 2019-09-24 NOTE — TELEPHONE ENCOUNTER
Returned call to Alexis, no answer. LVM advising pt appt is scheduled for 9/30 at 9:15am. Advised to return call if needed.

## 2019-09-25 DIAGNOSIS — M25.562 LEFT KNEE PAIN, UNSPECIFIED CHRONICITY: Primary | ICD-10-CM

## 2019-09-30 ENCOUNTER — HOSPITAL ENCOUNTER (OUTPATIENT)
Dept: RADIOLOGY | Facility: HOSPITAL | Age: 64
Discharge: HOME | End: 2019-09-30
Attending: ORTHOPAEDIC SURGERY
Payer: MEDICARE

## 2019-09-30 ENCOUNTER — OFFICE VISIT (OUTPATIENT)
Dept: ORTHOPEDICS | Facility: CLINIC | Age: 64
End: 2019-09-30
Payer: MEDICARE

## 2019-09-30 VITALS — RESPIRATION RATE: 20 BRPM | HEIGHT: 63 IN | BODY MASS INDEX: 51.91 KG/M2 | WEIGHT: 293 LBS

## 2019-09-30 DIAGNOSIS — M00.9 INFECTION OF RIGHT KNEE: Primary | ICD-10-CM

## 2019-09-30 DIAGNOSIS — M25.562 LEFT KNEE PAIN, UNSPECIFIED CHRONICITY: ICD-10-CM

## 2019-09-30 DIAGNOSIS — M25.561 RIGHT KNEE PAIN, UNSPECIFIED CHRONICITY: ICD-10-CM

## 2019-09-30 PROCEDURE — 73560 X-RAY EXAM OF KNEE 1 OR 2: CPT | Mod: 26,RT,, | Performed by: RADIOLOGY

## 2019-09-30 PROCEDURE — 99024 PR POST-OP FOLLOW-UP VISIT: ICD-10-PCS | Mod: S$GLB,,, | Performed by: ORTHOPAEDIC SURGERY

## 2019-09-30 PROCEDURE — 99999 PR PBB SHADOW E&M-EST. PATIENT-LVL III: ICD-10-PCS | Mod: PBBFAC,,, | Performed by: ORTHOPAEDIC SURGERY

## 2019-09-30 PROCEDURE — 73560 X-RAY EXAM OF KNEE 1 OR 2: CPT | Mod: TC,PN,RT

## 2019-09-30 PROCEDURE — 73560 XR KNEE 1 OR 2 VIEW RIGHT: ICD-10-PCS | Mod: 26,RT,, | Performed by: RADIOLOGY

## 2019-09-30 PROCEDURE — 99999 PR PBB SHADOW E&M-EST. PATIENT-LVL III: CPT | Mod: PBBFAC,,, | Performed by: ORTHOPAEDIC SURGERY

## 2019-09-30 PROCEDURE — 99024 POSTOP FOLLOW-UP VISIT: CPT | Mod: S$GLB,,, | Performed by: ORTHOPAEDIC SURGERY

## 2019-09-30 RX ORDER — CLINDAMYCIN HYDROCHLORIDE 300 MG/1
CAPSULE ORAL
Status: ON HOLD | COMMUNITY
Start: 2019-08-27 | End: 2019-10-07 | Stop reason: HOSPADM

## 2019-10-04 ENCOUNTER — TELEPHONE (OUTPATIENT)
Dept: ORTHOPEDICS | Facility: CLINIC | Age: 64
End: 2019-10-04

## 2019-10-04 NOTE — TELEPHONE ENCOUNTER
----- Message from Holland Parra sent at 10/4/2019  3:53 PM CDT -----  Contact: Dr Tianna Wynn with [facility] Please call at

## 2019-10-04 NOTE — PROGRESS NOTES
Past Medical History:   Diagnosis Date    Arthritis     Asthma     Encounter for blood transfusion     Hypertension        Past Surgical History:   Procedure Laterality Date     SECTION      KNEE ARTHROPLASTY Right 2019    Procedure: ARTHROPLASTY, KNEE;  Surgeon: Delio Chung MD;  Location: [facility] OR;  Service: Orthopedics;  Laterality: Right;  anesthesia:  general and block    REPLACEMENT OF WOUND VACUUM-ASSISTED CLOSURE DEVICE Right 2019    Procedure: REPLACEMENT, WOUND VAC;  Surgeon: Delio Chung MD;  Location: [facility] OR;  Service: Orthopedics;  Laterality: Right;    TONSILLECTOMY         No current facility-administered medications for this visit.      No current outpatient medications on file.     Facility-Administered Medications Ordered in Other Visits   Medication    albuterol nebulizer solution 2.5 mg    albuterol-ipratropium 2.5 mg-0.5 mg/3 mL nebulizer solution 3 mL    atorvastatin 5 MG split tablet 5 mg    buPROPion tablet 75 mg    cefTRIAXone (ROCEPHIN) 2 g in dextrose 5 % 50 mL IVPB    cetirizine tablet 10 mg    enoxaparin injection 40 mg    ferrous sulfate tablet 325 mg    FLUoxetine capsule 40 mg    furosemide injection 20 mg    gabapentin capsule 300 mg    HYDROcodone-acetaminophen  mg per tablet 1 tablet    levoFLOXacin tablet 500 mg    lidocaine (PF) 10 mg/ml (1%) injection 5 mg    miconazole 2 % cream    morphine injection 2 mg    nicotine 14 mg/24 hr 1 patch    pantoprazole EC tablet 40 mg    sodium chloride 0.9% flush 10 mL    And    sodium chloride 0.9% flush 10 mL    traZODone tablet 50 mg       Review of patient's allergies indicates:  No Known Allergies    Family History   Problem Relation Age of Onset    Alzheimer's disease Mother        Social History     Socioeconomic History    Marital status:      Spouse name: Not on file    Number of children: Not on file    Years of education: Not on file    Highest education  level: Not on file   Occupational History    Not on file   Social Needs    Financial resource strain: Not on file    Food insecurity:     Worry: Not on file     Inability: Not on file    Transportation needs:     Medical: Not on file     Non-medical: Not on file   Tobacco Use    Smoking status: Light Tobacco Smoker     Packs/day: 0.50     Years: 30.00     Pack years: 15.00     Types: Cigarettes    Smokeless tobacco: Never Used   Substance and Sexual Activity    Alcohol use: Yes     Comment: RARELY    Drug use: Never    Sexual activity: Not on file   Lifestyle    Physical activity:     Days per week: Not on file     Minutes per session: Not on file    Stress: Not on file   Relationships    Social connections:     Talks on phone: Not on file     Gets together: Not on file     Attends Sikhism service: Not on file     Active member of club or organization: Not on file     Attends meetings of clubs or organizations: Not on file     Relationship status: Not on file   Other Topics Concern    Not on file   Social History Narrative    Not on file       Chief Complaint:   Chief Complaint   Patient presents with    Post-op Evaluation     S/P RT KNEE I&D AND WOUND VAC PLACEMENT 9/12/19       Consulting Physician: No ref. provider found    History of present illness: This is a 64 y.o. female following up for I and D of right knee wound with placement of wound VAC on 09/12/2019.      Review of Systems:    Constitution: Denies chills, fever, and sweats.    HENT: Denies headaches or blurry vision.    Cardiovascular: Denies chest pain or irregular heart beat.    Respiratory: Denies cough or shortness of breath.    Gastrointestinal: Denies abdominal pain, nausea, or vomiting.    Musculoskeletal:  Denies muscle cramps.    Neurological: Denies dizziness or focal weakness.    Psychiatric/Behavioral: Normal mental status.    Hematologic/Lymphatic: Denies bleeding problem or easy bruising/bleeding.    Skin: Denies rash or  suspicious lesions.      Examination:    Vital Signs:    Vitals:    09/30/19 0919   Resp: 20       Body mass index is 51.98 kg/m².    This a well-developed, well nourished patient in no acute distress.    Alert and oriented and cooperative to examination.       Physical Exam:  Right knee    Inspection/Observation   Swelling:   mild  Erythema:   none  Bruising:   none  Wounds:   Wound VAC in place. Good granulation tissue under wound VAC.  Drainage:  None    Range of Motion   Limited    Muscle Strength   4    Other   Sensation:  normal  Pulse:    palpable    Imaging: Normal post-operative XR     Assessment: Infection of right knee        Plan:  She is in rehab.  We will continue her wound VAC with changes every 2 days.  She still needs an appointment to see our plastic surgeon.  She needs to continue her IV antibiotics per the Infectious Disease recommendations.  She is back to see us in 4 weeks.      DISCLAIMER: This note may have been dictated using voice recognition software and may contain grammatical errors. Report sent to referring provider as required.

## 2019-10-07 PROBLEM — S81.001A OPEN KNEE WOUND, RIGHT, INITIAL ENCOUNTER: Status: ACTIVE | Noted: 2019-10-07

## 2019-10-09 PROBLEM — Z01.89: Status: ACTIVE | Noted: 2019-10-09

## 2019-10-14 ENCOUNTER — TELEPHONE (OUTPATIENT)
Dept: FAMILY MEDICINE | Facility: CLINIC | Age: 64
End: 2019-10-14

## 2019-10-14 RX ORDER — HYDROCODONE BITARTRATE AND ACETAMINOPHEN 10; 325 MG/1; MG/1
1 TABLET ORAL EVERY 6 HOURS PRN
Qty: 28 TABLET | Refills: 0 | Status: SHIPPED | OUTPATIENT
Start: 2019-10-14 | End: 2019-10-23 | Stop reason: SDUPTHER

## 2019-10-14 NOTE — TELEPHONE ENCOUNTER
Spoke with Marline she says pt BP was really low today 100/62. Also she has 3+ pitting edema from her toes to her hip. Spoke with Marline per Roselyn pt needs to take her fluid pills daily.

## 2019-10-14 NOTE — TELEPHONE ENCOUNTER
Spoke to patient. Advised medication refill has been sent to the pharmacy and should be available for pickup. Verbalized understanding.

## 2019-10-14 NOTE — TELEPHONE ENCOUNTER
----- Message from Leyda Worthington sent at 10/14/2019  1:22 PM CDT -----  - Np with phn calling for the patient she did her post op discharge and wants to talk to someone about the pt [phone]

## 2019-10-14 NOTE — TELEPHONE ENCOUNTER
----- Message from Iliana Crowe MA sent at 10/14/2019  2:38 PM CDT -----  Contact: lashell Levine   Wound vac,   Pt in pain, given norco needs refill   Pharmacy [facility] , [address]     Call back  [phone]

## 2019-10-23 ENCOUNTER — OFFICE VISIT (OUTPATIENT)
Dept: FAMILY MEDICINE | Facility: CLINIC | Age: 64
End: 2019-10-23
Payer: MEDICARE

## 2019-10-23 VITALS
DIASTOLIC BLOOD PRESSURE: 84 MMHG | OXYGEN SATURATION: 99 % | BODY MASS INDEX: 46.6 KG/M2 | HEIGHT: 63 IN | SYSTOLIC BLOOD PRESSURE: 122 MMHG | WEIGHT: 263 LBS | HEART RATE: 80 BPM

## 2019-10-23 DIAGNOSIS — K21.9 GASTROESOPHAGEAL REFLUX DISEASE WITHOUT ESOPHAGITIS: ICD-10-CM

## 2019-10-23 DIAGNOSIS — T84.53XD INFECTION OF TOTAL RIGHT KNEE REPLACEMENT, SUBSEQUENT ENCOUNTER: Primary | ICD-10-CM

## 2019-10-23 DIAGNOSIS — M17.11 PRIMARY OSTEOARTHRITIS OF RIGHT KNEE: Primary | ICD-10-CM

## 2019-10-23 DIAGNOSIS — J44.9 CHRONIC OBSTRUCTIVE PULMONARY DISEASE, UNSPECIFIED COPD TYPE: ICD-10-CM

## 2019-10-23 DIAGNOSIS — M25.561 RIGHT KNEE PAIN, UNSPECIFIED CHRONICITY: ICD-10-CM

## 2019-10-23 DIAGNOSIS — D64.9 ANEMIA, UNSPECIFIED TYPE: ICD-10-CM

## 2019-10-23 DIAGNOSIS — F17.210 CIGARETTE SMOKER: ICD-10-CM

## 2019-10-23 DIAGNOSIS — E78.5 DYSLIPIDEMIA: ICD-10-CM

## 2019-10-23 DIAGNOSIS — F33.0 MILD EPISODE OF RECURRENT MAJOR DEPRESSIVE DISORDER: ICD-10-CM

## 2019-10-23 PROCEDURE — 1111F DSCHRG MED/CURRENT MED MERGE: CPT | Mod: S$GLB,,, | Performed by: NURSE PRACTITIONER

## 2019-10-23 PROCEDURE — 99214 PR OFFICE/OUTPT VISIT, EST, LEVL IV, 30-39 MIN: ICD-10-PCS | Mod: S$GLB,,, | Performed by: NURSE PRACTITIONER

## 2019-10-23 PROCEDURE — 1101F PR PT FALLS ASSESS DOC 0-1 FALLS W/OUT INJ PAST YR: ICD-10-PCS | Mod: S$GLB,,, | Performed by: NURSE PRACTITIONER

## 2019-10-23 PROCEDURE — 1101F PT FALLS ASSESS-DOCD LE1/YR: CPT | Mod: S$GLB,,, | Performed by: NURSE PRACTITIONER

## 2019-10-23 PROCEDURE — 99214 OFFICE O/P EST MOD 30 MIN: CPT | Mod: S$GLB,,, | Performed by: NURSE PRACTITIONER

## 2019-10-23 PROCEDURE — 1111F PR DISCHARGE MEDS RECONCILED W/ CURRENT OUTPATIENT MED LIST: ICD-10-PCS | Mod: S$GLB,,, | Performed by: NURSE PRACTITIONER

## 2019-10-23 RX ORDER — UMECLIDINIUM BROMIDE AND VILANTEROL TRIFENATATE 62.5; 25 UG/1; UG/1
1 POWDER RESPIRATORY (INHALATION) DAILY PRN
Qty: 1 EACH | Refills: 5 | Status: SHIPPED | OUTPATIENT
Start: 2019-10-23 | End: 2020-10-21 | Stop reason: SDUPTHER

## 2019-10-23 RX ORDER — BUPROPION HYDROCHLORIDE 75 MG/1
75 TABLET ORAL 2 TIMES DAILY
Qty: 180 TABLET | Refills: 1 | Status: SHIPPED | OUTPATIENT
Start: 2019-10-23 | End: 2019-10-23

## 2019-10-23 RX ORDER — GABAPENTIN 300 MG/1
300 CAPSULE ORAL 3 TIMES DAILY
Qty: 90 CAPSULE | Refills: 5 | Status: SHIPPED | OUTPATIENT
Start: 2019-10-23 | End: 2019-10-23

## 2019-10-23 RX ORDER — GABAPENTIN 300 MG/1
300 CAPSULE ORAL 3 TIMES DAILY
Qty: 90 CAPSULE | Refills: 5 | Status: SHIPPED | OUTPATIENT
Start: 2019-10-23 | End: 2020-04-29 | Stop reason: SDUPTHER

## 2019-10-23 RX ORDER — BUPROPION HYDROCHLORIDE 75 MG/1
75 TABLET ORAL 2 TIMES DAILY
Qty: 180 TABLET | Refills: 1 | Status: SHIPPED | OUTPATIENT
Start: 2019-10-23 | End: 2020-04-16

## 2019-10-23 RX ORDER — PANTOPRAZOLE SODIUM 40 MG/1
40 TABLET, DELAYED RELEASE ORAL DAILY
Qty: 90 TABLET | Refills: 1 | Status: SHIPPED | OUTPATIENT
Start: 2019-10-23 | End: 2020-04-16

## 2019-10-23 RX ORDER — FLUOXETINE HYDROCHLORIDE 40 MG/1
40 CAPSULE ORAL DAILY
Qty: 90 CAPSULE | Refills: 1 | Status: SHIPPED | OUTPATIENT
Start: 2019-10-23 | End: 2019-10-23

## 2019-10-23 RX ORDER — FLUOXETINE HYDROCHLORIDE 40 MG/1
40 CAPSULE ORAL DAILY
Qty: 90 CAPSULE | Refills: 1 | Status: SHIPPED | OUTPATIENT
Start: 2019-10-23 | End: 2020-07-21 | Stop reason: SDUPTHER

## 2019-10-23 RX ORDER — HYDROCODONE BITARTRATE AND ACETAMINOPHEN 10; 325 MG/1; MG/1
1 TABLET ORAL EVERY 6 HOURS PRN
Qty: 60 TABLET | Refills: 0 | Status: SHIPPED | OUTPATIENT
Start: 2019-10-23 | End: 2019-10-28 | Stop reason: SDUPTHER

## 2019-10-23 RX ORDER — ATORVASTATIN CALCIUM 10 MG/1
10 TABLET, FILM COATED ORAL NIGHTLY
Qty: 90 TABLET | Refills: 1 | Status: SHIPPED | OUTPATIENT
Start: 2019-10-23 | End: 2020-06-18 | Stop reason: SDUPTHER

## 2019-10-23 RX ORDER — PANTOPRAZOLE SODIUM 40 MG/1
40 TABLET, DELAYED RELEASE ORAL DAILY
Qty: 90 TABLET | Refills: 1 | Status: SHIPPED | OUTPATIENT
Start: 2019-10-23 | End: 2019-10-23

## 2019-10-23 RX ORDER — UMECLIDINIUM BROMIDE AND VILANTEROL TRIFENATATE 62.5; 25 UG/1; UG/1
1 POWDER RESPIRATORY (INHALATION) DAILY PRN
Qty: 1 EACH | Refills: 5 | Status: SHIPPED | OUTPATIENT
Start: 2019-10-23 | End: 2019-10-23

## 2019-10-23 RX ORDER — ATORVASTATIN CALCIUM 10 MG/1
5 TABLET, FILM COATED ORAL NIGHTLY
Qty: 90 TABLET | Refills: 1 | Status: SHIPPED | OUTPATIENT
Start: 2019-10-23 | End: 2019-10-23

## 2019-10-23 RX ORDER — FERROUS SULFATE 325(65) MG
325 TABLET ORAL DAILY
Refills: 0 | COMMUNITY
Start: 2019-10-23 | End: 2024-02-03

## 2019-10-23 NOTE — PROGRESS NOTES
SUBJECTIVE:    Patient ID: Iris Mueller is a 64 y.o. female.    Chief Complaint: Follow-up (Been in hospital several times recently  //mlr) and Depression (Depressed because she is unable to get around and do things at home that she would like to do.)    Pt here for LTAC discharge f/u- pt admitted to [facility] last month with infected right knee after TKR-  Brought to OR and had I&D/washout a total of 3 times by Dr. Chung- wound cultures + for proteus and stenotrophomonas. wound vac placed and sent to [facility] initially and then transferred to [facility] for IV abx/wound care on 9/18/19. Was seen by Dr. Emery for wound care while at [facility] and saw Dr. To during hospital stay but wasn't advised to f/u on discharge. Discharged from LTAC 10/8/19    Since discharge now on oral levaquin- had appt with Dr. Chung on 9/30 with f/u in 1 month and has appt with Dr. Moreno, plastics later this week. Pt reports overall shes felling better, right knee wound is healing/filling in with granulation tissue. Knee is still painful, taking pain med 3-4 times/day. walking with walker and feels stable.  Has HH SN and PT- wound vac changes every other day.     Still smoking a few cigs/day- reports breathing/COPD has been stable      No results displayed because visit has over 200 results.      No results displayed because visit has over 200 results.      No results displayed because visit has over 200 results.      Admission on 08/23/2019, Discharged on 08/23/2019   Component Date Value Ref Range Status    WBC 08/23/2019 11.87  3.90 - 12.70 K/uL Final    RBC 08/23/2019 2.90* 4.00 - 5.40 M/uL Final    Hemoglobin 08/23/2019 9.1* 12.0 - 16.0 g/dL Final    Hematocrit 08/23/2019 27.8* 37.0 - 48.5 % Final    Mean Corpuscular Volume 08/23/2019 96  82 - 98 fL Final    Mean Corpuscular Hemoglobin 08/23/2019 31.4* 27.0 - 31.0 pg Final    Mean Corpuscular Hemoglobin Conc 08/23/2019 32.7  32.0 -  36.0 g/dL Final    RDW 08/23/2019 14.6* 11.5 - 14.5 % Final    Platelets 08/23/2019 428* 150 - 350 K/uL Final    MPV 08/23/2019 8.3* 9.2 - 12.9 fL Final    Gran # (ANC) 08/23/2019 9.5* 1.8 - 7.7 K/uL Final    Immature Grans (Abs) 08/23/2019 0.44* 0.00 - 0.04 K/uL Final    Lymph # 08/23/2019 1.1  1.0 - 4.8 K/uL Final    Mono # 08/23/2019 0.6  0.3 - 1.0 K/uL Final    Eos # 08/23/2019 0.2  0.0 - 0.5 K/uL Final    Baso # 08/23/2019 0.02  0.00 - 0.20 K/uL Final    nRBC 08/23/2019 0  0 /100 WBC Final    Gran% 08/23/2019 79.9* 38.0 - 73.0 % Final    Lymph% 08/23/2019 9.4* 18.0 - 48.0 % Final    Mono% 08/23/2019 5.0  4.0 - 15.0 % Final    Eosinophil% 08/23/2019 1.8  0.0 - 8.0 % Final    Basophil% 08/23/2019 0.2  0.0 - 1.9 % Final    Differential Method 08/23/2019 Automated   Final    Sodium 08/23/2019 128* 136 - 145 mmol/L Final    Potassium 08/23/2019 3.7  3.5 - 5.1 mmol/L Final    Chloride 08/23/2019 93* 95 - 110 mmol/L Final    CO2 08/23/2019 23  23 - 29 mmol/L Final    Glucose 08/23/2019 103  70 - 110 mg/dL Final    BUN, Bld 08/23/2019 21  8 - 23 mg/dL Final    Creatinine 08/23/2019 1.2  0.5 - 1.4 mg/dL Final    Calcium 08/23/2019 9.2  8.7 - 10.5 mg/dL Final    Total Protein 08/23/2019 6.2  6.0 - 8.4 g/dL Final    Albumin 08/23/2019 2.8* 3.5 - 5.2 g/dL Final    Total Bilirubin 08/23/2019 0.7  0.1 - 1.0 mg/dL Final    Alkaline Phosphatase 08/23/2019 131  55 - 135 U/L Final    AST 08/23/2019 13  10 - 40 U/L Final    ALT 08/23/2019 14  10 - 44 U/L Final    Anion Gap 08/23/2019 12  8 - 16 mmol/L Final    eGFR if  08/23/2019 55* >60 mL/min/1.73 m^2 Final    eGFR if non  08/23/2019 48* >60 mL/min/1.73 m^2 Final    BNP 08/23/2019 199* 0 - 99 pg/mL Final   Admission on 08/14/2019, Discharged on 08/15/2019   Component Date Value Ref Range Status    Sodium 08/14/2019 132* 136 - 145 mmol/L Final    Sodium 08/15/2019 132* 136 - 145 mmol/L Final    Potassium  08/15/2019 5.5* 3.5 - 5.1 mmol/L Final    Chloride 08/15/2019 99  95 - 110 mmol/L Final    CO2 08/15/2019 25  23 - 29 mmol/L Final    Glucose 08/15/2019 119* 70 - 110 mg/dL Final    BUN, Bld 08/15/2019 21  8 - 23 mg/dL Final    Creatinine 08/15/2019 1.4  0.5 - 1.4 mg/dL Final    Calcium 08/15/2019 9.0  8.7 - 10.5 mg/dL Final    Anion Gap 08/15/2019 8  8 - 16 mmol/L Final    eGFR if  08/15/2019 45* >60 mL/min/1.73 m^2 Final    eGFR if non African American 08/15/2019 39* >60 mL/min/1.73 m^2 Final    WBC 08/15/2019 19.64* 3.90 - 12.70 K/uL Final    RBC 08/15/2019 3.61* 4.00 - 5.40 M/uL Final    Hemoglobin 08/15/2019 11.1* 12.0 - 16.0 g/dL Final    Hematocrit 08/15/2019 34.6* 37.0 - 48.5 % Final    Mean Corpuscular Volume 08/15/2019 96  82 - 98 fL Final    Mean Corpuscular Hemoglobin 08/15/2019 30.7  27.0 - 31.0 pg Final    Mean Corpuscular Hemoglobin Conc 08/15/2019 32.1  32.0 - 36.0 g/dL Final    RDW 08/15/2019 13.6  11.5 - 14.5 % Final    Platelets 08/15/2019 433* 150 - 350 K/uL Final    MPV 08/15/2019 8.8* 9.2 - 12.9 fL Final    Gran # (ANC) 08/15/2019 17.7* 1.8 - 7.7 K/uL Final    Immature Grans (Abs) 08/15/2019 0.10* 0.00 - 0.04 K/uL Final    Lymph # 08/15/2019 0.9* 1.0 - 4.8 K/uL Final    Mono # 08/15/2019 0.9  0.3 - 1.0 K/uL Final    Eos # 08/15/2019 0.0  0.0 - 0.5 K/uL Final    Baso # 08/15/2019 0.02  0.00 - 0.20 K/uL Final    nRBC 08/15/2019 0  0 /100 WBC Final    Gran% 08/15/2019 90.1* 38.0 - 73.0 % Final    Lymph% 08/15/2019 4.7* 18.0 - 48.0 % Final    Mono% 08/15/2019 4.6  4.0 - 15.0 % Final    Eosinophil% 08/15/2019 0.0  0.0 - 8.0 % Final    Basophil% 08/15/2019 0.1  0.0 - 1.9 % Final    Differential Method 08/15/2019 Automated   Final    Magnesium 08/15/2019 2.0  1.6 - 2.6 mg/dL Final    Phosphorus 08/15/2019 4.6* 2.7 - 4.5 mg/dL Final   Hospital Outpatient Visit on 08/08/2019   Component Date Value Ref Range Status    Activity 08/08/2019 167  55 - 200 %  Final    Von Willebrand Ag 08/08/2019 200  55 - 200 % Final    Von Willebrand Factor Activity 08/08/2019 141  55 - 200 % Final    VWF Profile Interpretation 08/08/2019 SEE BELOW   Final    VW Reviewed by: 08/08/2019 FEDERICO Wilde.   Final    Von Willebrand Multimers 08/08/2019 SEE BELOW   Final   Clinical Support on 08/08/2019   Component Date Value Ref Range Status    Target HR 08/08/2019 131.75  bpm Final    HR at rest 08/08/2019 70.0  bpm Final    Systolic blood pressure 08/08/2019 132.0  mmHg Final    Diastolic blood pressure 08/08/2019 80.0  mmHg Final    RPP 08/08/2019 9,240   Final    Peak HR 08/08/2019 92.0  bpm Final    Peak Systolic BP 08/08/2019 139.0  mmHg Final    Peak Diatolic BP 08/08/2019 73.0  mmHg Final    Peak RPP 08/08/2019 12,788   Final    85% Max Predicted HR 08/08/2019 126   Final    % Max HR Achieved 08/08/2019 62   Final    Max Predicted HR 08/08/2019 149   Final    OHS CV CPX PATIENT IS MALE 08/08/2019 0   Final    OHS CV CPX PATIENT IS FEMALE 08/08/2019 1   Final   Hospital Outpatient Visit on 08/01/2019   Component Date Value Ref Range Status    Sodium 08/01/2019 129* 136 - 145 mmol/L Final    Potassium 08/01/2019 3.3* 3.5 - 5.1 mmol/L Final    Chloride 08/01/2019 92* 95 - 110 mmol/L Final    CO2 08/01/2019 28  23 - 29 mmol/L Final    Glucose 08/01/2019 87  70 - 110 mg/dL Final    BUN, Bld 08/01/2019 13  8 - 23 mg/dL Final    Creatinine 08/01/2019 1.1  0.5 - 1.4 mg/dL Final    Calcium 08/01/2019 9.8  8.7 - 10.5 mg/dL Final    Anion Gap 08/01/2019 9  8 - 16 mmol/L Final    eGFR if  08/01/2019 >60  >60 mL/min/1.73 m^2 Final    eGFR if non African American 08/01/2019 53* >60 mL/min/1.73 m^2 Final    WBC 08/01/2019 9.13  3.90 - 12.70 K/uL Final    RBC 08/01/2019 4.26  4.00 - 5.40 M/uL Final    Hemoglobin 08/01/2019 12.9  12.0 - 16.0 g/dL Final    Hematocrit 08/01/2019 39.8  37.0 - 48.5 % Final    Mean Corpuscular Volume 08/01/2019 93   82 - 98 fL Final    Mean Corpuscular Hemoglobin 2019 30.3  27.0 - 31.0 pg Final    Mean Corpuscular Hemoglobin Conc 2019 32.4  32.0 - 36.0 g/dL Final    RDW 2019 13.8  11.5 - 14.5 % Final    Platelets 2019 400* 150 - 350 K/uL Final    MPV 2019 8.6* 9.2 - 12.9 fL Final    Gran # (ANC) 2019 6.4  1.8 - 7.7 K/uL Final    Immature Grans (Abs) 2019 0.07* 0.00 - 0.04 K/uL Final    Lymph # 2019 1.8  1.0 - 4.8 K/uL Final    Mono # 2019 0.6  0.3 - 1.0 K/uL Final    Eos # 2019 0.2  0.0 - 0.5 K/uL Final    Baso # 2019 0.05  0.00 - 0.20 K/uL Final    nRBC 2019 0  0 /100 WBC Final    Gran% 2019 69.5  38.0 - 73.0 % Final    Lymph% 2019 20.2  18.0 - 48.0 % Final    Mono% 2019 6.8  4.0 - 15.0 % Final    Eosinophil% 2019 2.2  0.0 - 8.0 % Final    Basophil% 2019 0.5  0.0 - 1.9 % Final    Differential Method 2019 Automated   Final    Group & Rh 2019 B NEG   Final    Indirect Felicia 2019 NEG   Final    MRSA Surveillance Screen 2019 No MRSA isolated   Final    Factor V Activity 2019 83  60 - 145 % Final       Past Medical History:   Diagnosis Date    Arthritis     Asthma     Encounter for blood transfusion     Hypertension      Past Surgical History:   Procedure Laterality Date     SECTION      JOINT REPLACEMENT      Knee    KNEE ARTHROPLASTY Right 2019    Procedure: ARTHROPLASTY, KNEE;  Surgeon: Delio Chung MD;  Location: [facility];  Service: Orthopedics;  Laterality: Right;  anesthesia:  general and block    REPLACEMENT OF WOUND VACUUM-ASSISTED CLOSURE DEVICE Right 2019    Procedure: REPLACEMENT, WOUND VAC;  Surgeon: Delio Chung MD;  Location: [facility];  Service: Orthopedics;  Laterality: Right;    TONSILLECTOMY       Family History   Problem Relation Age of Onset    Alzheimer's disease Mother        Marital Status:   Alcohol History:   reports that she drinks alcohol.  Tobacco History:  reports that she has been smoking cigarettes. She has a 15.00 pack-year smoking history. She has never used smokeless tobacco.  Drug History:  reports that she does not use drugs.    Review of patient's allergies indicates:  No Known Allergies    Current Outpatient Medications:     ANORO ELLIPTA 62.5-25 mcg/actuation DsDv, Inhale 1 puff into the lungs daily as needed (SOB)., Disp: 1 each, Rfl: 5    atorvastatin (LIPITOR) 10 MG tablet, Take 1 tablet (10 mg total) by mouth every evening., Disp: 90 tablet, Rfl: 1    buPROPion (WELLBUTRIN) 75 MG tablet, Take 1 tablet (75 mg total) by mouth 2 (two) times daily., Disp: 180 tablet, Rfl: 1    ferrous sulfate (FEOSOL) 325 mg (65 mg iron) Tab tablet, Take 1 tablet (325 mg total) by mouth once daily., Disp: , Rfl: 0    FLUoxetine 40 MG capsule, Take 1 capsule (40 mg total) by mouth once daily., Disp: 90 capsule, Rfl: 1    furosemide (LASIX) 20 MG tablet, Take 1 tablet (20 mg total) by mouth once daily., Disp: 30 tablet, Rfl: 0    gabapentin (NEURONTIN) 300 MG capsule, Take 1 capsule (300 mg total) by mouth 3 (three) times daily., Disp: 90 capsule, Rfl: 5    levoFLOXacin (LEVAQUIN) 500 MG tablet, Take 1 tablet (500 mg total) by mouth once daily. Take until seen in clinic by Orthopedic Surgery and Plastic Surgery, Disp: 28 tablet, Rfl: 0    pantoprazole (PROTONIX) 40 MG tablet, Take 1 tablet (40 mg total) by mouth once daily., Disp: 90 tablet, Rfl: 1    HYDROcodone-acetaminophen (NORCO)  mg per tablet, Take 1 tablet by mouth every 6 (six) hours as needed for Pain., Disp: 60 tablet, Rfl: 0    traZODone (DESYREL) 50 MG tablet, Take 1 tablet (50 mg total) by mouth every evening. (Patient not taking: Reported on 10/23/2019), Disp: 30 tablet, Rfl: 0  No current facility-administered medications for this visit.     Facility-Administered Medications Ordered in Other Visits:     lidocaine (PF) 10 mg/ml (1%) injection  "5 mg, 0.5 mL, Intradermal, Once, Azeem Anderson MD    Review of Systems   Constitutional: Negative for appetite change (eating good since discharge), chills and fever.   Respiratory: Positive for shortness of breath. Negative for cough and wheezing (with exertion, relieves with rest).    Cardiovascular: Negative for chest pain, palpitations and leg swelling.   Gastrointestinal: Negative for abdominal pain, constipation and diarrhea.   Genitourinary: Negative for dysuria, frequency and hematuria.   Musculoskeletal: Positive for arthralgias and joint swelling.   Neurological: Negative for dizziness, syncope and numbness.   Psychiatric/Behavioral: Positive for dysphoric mood (depressed over medical complications/prolonged hospital stay).          Objective:      Vitals:    10/23/19 1421   BP: 122/84   Pulse: 80   SpO2: 99%   Weight: 119.3 kg (263 lb)   Height: 5' 3" (1.6 m)     Physical Exam   Constitutional: She is oriented to person, place, and time. She appears well-developed and well-nourished.   Obese WF   HENT:   Head: Normocephalic and atraumatic.   Right Ear: Tympanic membrane and ear canal normal.   Left Ear: Tympanic membrane and ear canal normal.   Mouth/Throat: Mucous membranes are normal. No posterior oropharyngeal erythema.   Neck: Neck supple. Carotid bruit is not present.   Cardiovascular: Normal rate and regular rhythm. Exam reveals no gallop and no friction rub.   No murmur heard.  Pulses:       Dorsalis pedis pulses are 2+ on the right side, and 2+ on the left side.   Pulmonary/Chest: Effort normal and breath sounds normal. No respiratory distress. She has no wheezes. She has no rales.   Abdominal: Soft. She exhibits no distension. There is no tenderness.   Musculoskeletal: She exhibits edema (right knee wound vac in place, mild swelling to knee).   Lymphadenopathy:     She has no cervical adenopathy.   Neurological: She is alert and oriented to person, place, and time. She has normal strength. " Gait (antalgic gait with walker use) abnormal.   Skin: Skin is warm and dry. No rash noted.   Psychiatric: She has a normal mood and affect.   Nursing note and vitals reviewed.        Assessment:       1. Infection of total right knee replacement, subsequent encounter    2. Anemia, unspecified type    3. Dyslipidemia    4. Chronic obstructive pulmonary disease, unspecified COPD type    5. Gastroesophageal reflux disease without esophagitis    6. Right knee pain, unspecified chronicity    7. Mild episode of recurrent major depressive disorder    8. Cigarette smoker           Plan:       Infection of total right knee replacement, subsequent encounter  -     Cancel: CBC auto differential; Future; Expected date: 10/23/2019  -     Cancel: Basic metabolic panel; Future; Expected date: 10/23/2019  -     Cancel: C-reactive protein; Future; Expected date: 10/23/2019  -     Ambulatory Referral to Infectious Disease  -     CBC auto differential; Future; Expected date: 10/23/2019  -     Basic metabolic panel; Future; Expected date: 10/23/2019  -     C-reactive protein; Future; Expected date: 10/23/2019  -appears to be slowly improving/healing- cautioned to report any fever, redness, foul odor or purulent drainage from knee wound. I did advise pt to f/u with Dr. To    Anemia, unspecified type  -     ferrous sulfate (FEOSOL) 325 mg (65 mg iron) Tab tablet; Take 1 tablet (325 mg total) by mouth once daily.; Refill: 0  -hgb 8.9 prior to LTAC discharge- will send orders for HH to repeat labs in 1-2 weeks    Dyslipidemia  -     Discontinue: atorvastatin (LIPITOR) 10 MG tablet; Take 0.5 tablets (5 mg total) by mouth every evening.  Dispense: 90 tablet; Refill: 1  -     Cancel: Lipid panel; Future; Expected date: 10/23/2019  -     Cancel: TSH; Future; Expected date: 10/23/2019  -     TSH; Future; Expected date: 10/23/2019  -     Lipid panel; Future; Expected date: 10/23/2019  -     atorvastatin (LIPITOR) 10 MG tablet; Take 1  tablet (10 mg total) by mouth every evening.  Dispense: 90 tablet; Refill: 1  -stable, repeat labs before next visit    Chronic obstructive pulmonary disease, unspecified COPD type  -     Discontinue: ANORO ELLIPTA 62.5-25 mcg/actuation DsDv; Inhale 1 puff into the lungs daily as needed (SOB).  Dispense: 1 each; Refill: 5  -     ANORO ELLIPTA 62.5-25 mcg/actuation DsDv; Inhale 1 puff into the lungs daily as needed (SOB).  Dispense: 1 each; Refill: 5  -stable though continues to smoke.  Discussed with patient increased risk of poor healing given smoking     Gastroesophageal reflux disease without esophagitis  -     Discontinue: pantoprazole (PROTONIX) 40 MG tablet; Take 1 tablet (40 mg total) by mouth once daily.  Dispense: 90 tablet; Refill: 1  -     pantoprazole (PROTONIX) 40 MG tablet; Take 1 tablet (40 mg total) by mouth once daily.  Dispense: 90 tablet; Refill: 1  -stable on med    Cigarette smoker  -     Discontinue: buPROPion (WELLBUTRIN) 75 MG tablet; Take 1 tablet (75 mg total) by mouth 2 (two) times daily.  Dispense: 180 tablet; Refill: 1  -     buPROPion (WELLBUTRIN) 75 MG tablet; Take 1 tablet (75 mg total) by mouth 2 (two) times daily.  Dispense: 180 tablet; Refill: 1    Right knee pain, unspecified chronicity  -     Discontinue: gabapentin (NEURONTIN) 300 MG capsule; Take 1 capsule (300 mg total) by mouth 3 (three) times daily.  Dispense: 90 capsule; Refill: 5  -     gabapentin (NEURONTIN) 300 MG capsule; Take 1 capsule (300 mg total) by mouth 3 (three) times daily.  Dispense: 90 capsule; Refill: 5  -gabapentin added during hospital stay for knee pain complaints which has helped some    Mild episode of recurrent major depressive disorder  -     Discontinue: FLUoxetine 40 MG capsule; Take 1 capsule (40 mg total) by mouth once daily.  Dispense: 90 capsule; Refill: 1  -     FLUoxetine 40 MG capsule; Take 1 capsule (40 mg total) by mouth once daily.  Dispense: 90 capsule; Refill: 1  Patient admits to some  increasing depression given her medical complications over the last month or 2.  She was started on bupropion couple months ago to try to help her quit smoking.  Has not helped that much with the smoking though feel she is tolerating and does not feel she needs any additional medication or referrals at this time for depression.  Denies any suicide ideation    Follow up in about 3 months (around 1/23/2020) for HTN/COPD.        10/23/2019 Roselyn Cote NP

## 2019-10-25 ENCOUNTER — OFFICE VISIT (OUTPATIENT)
Dept: PLASTIC SURGERY | Facility: CLINIC | Age: 64
End: 2019-10-25
Payer: MEDICARE

## 2019-10-25 VITALS
WEIGHT: 263 LBS | DIASTOLIC BLOOD PRESSURE: 91 MMHG | BODY MASS INDEX: 46.59 KG/M2 | HEART RATE: 75 BPM | SYSTOLIC BLOOD PRESSURE: 158 MMHG

## 2019-10-25 DIAGNOSIS — S81.001A OPEN KNEE WOUND, RIGHT, INITIAL ENCOUNTER: Primary | ICD-10-CM

## 2019-10-25 PROCEDURE — 1101F PT FALLS ASSESS-DOCD LE1/YR: CPT | Mod: CPTII,S$GLB,, | Performed by: SURGERY

## 2019-10-25 PROCEDURE — 99999 PR PBB SHADOW E&M-EST. PATIENT-LVL III: CPT | Mod: PBBFAC,,, | Performed by: SURGERY

## 2019-10-25 PROCEDURE — 1101F PR PT FALLS ASSESS DOC 0-1 FALLS W/OUT INJ PAST YR: ICD-10-PCS | Mod: CPTII,S$GLB,, | Performed by: SURGERY

## 2019-10-25 PROCEDURE — 99999 PR PBB SHADOW E&M-EST. PATIENT-LVL III: ICD-10-PCS | Mod: PBBFAC,,, | Performed by: SURGERY

## 2019-10-25 PROCEDURE — 99203 OFFICE O/P NEW LOW 30 MIN: CPT | Mod: S$GLB,,, | Performed by: SURGERY

## 2019-10-25 PROCEDURE — 99203 PR OFFICE/OUTPT VISIT, NEW, LEVL III, 30-44 MIN: ICD-10-PCS | Mod: S$GLB,,, | Performed by: SURGERY

## 2019-10-25 NOTE — PROGRESS NOTES
This patient presents to Plastic surgery Clinic on referral from Orthopedics in [place].  This patient underwent a total knee on the 14th of August.  She developed a postoperative infection requiring 2 washouts which left her with a large open wound.  This has been treated with local wound care.  Physical examination reveals a morbidly obese white female, patient has a  34 pack year history of smoking she most recently stopped 2 months ago.  Her knee examination shows a very nice granulation bed surrounding everything but on totally nonviable patellar tendon in the center which is about 3 x 4 cm.  This tendon will absolutely need to be debrided it is not viable at all.   the patient needs to undergo a gastrocnemius muscle flap after debridement of this tendon her leg is more markedly swollen she is a very big risk for total failure of the muscle flap and absolute big risk for an open wound from our incision to harvest the muscle flap. I will discuss this case with her orthopedic surgeon.

## 2019-10-28 DIAGNOSIS — M17.11 PRIMARY OSTEOARTHRITIS OF RIGHT KNEE: ICD-10-CM

## 2019-10-28 RX ORDER — HYDROCODONE BITARTRATE AND ACETAMINOPHEN 10; 325 MG/1; MG/1
1 TABLET ORAL EVERY 6 HOURS PRN
Qty: 60 TABLET | Refills: 0 | Status: SHIPPED | OUTPATIENT
Start: 2019-10-28 | End: 2019-11-12 | Stop reason: SDUPTHER

## 2019-10-28 NOTE — TELEPHONE ENCOUNTER
----- Message from Maricel Boone sent at 10/28/2019  2:42 PM CDT -----  Contact: Iris Mueller   ##Pain med was sent to wrong pharmacy##  Please re-send HYDROcodone-acetaminophen (NORCO)  mg per tablet to the [facility] please.   Pt# [phone]

## 2019-10-30 ENCOUNTER — TELEPHONE (OUTPATIENT)
Dept: PLASTIC SURGERY | Facility: CLINIC | Age: 64
End: 2019-10-30

## 2019-10-30 ENCOUNTER — TELEPHONE (OUTPATIENT)
Dept: ORTHOPEDICS | Facility: CLINIC | Age: 64
End: 2019-10-30

## 2019-10-30 NOTE — TELEPHONE ENCOUNTER
Spoke to Lashonda with [facility], Advised patient is due to come in. Advised to keep current orders and new orders will be sent once the patient is seen. Advised will contact the patient to schedule an appt for this Monday 11/4. Verbalized understanding.    Spoke to patient. Appt made for 11/4/19 at 2:45pm. Verbalized understanding.

## 2019-10-30 NOTE — TELEPHONE ENCOUNTER
----- Message from Iliana Crowe MA sent at 10/30/2019 10:43 AM CDT -----  Contact: steven anderson maggie   Needs to up date wound care orders if ok (ordered every other day)   They are seeing her Monday Wednesday Friday   Call back

## 2019-10-30 NOTE — TELEPHONE ENCOUNTER
Pts home health called and requested that i fax pts last note from last office visit // I faxed note over , They also wanted to let me know that they were doing wound vac changes MWF on patient.

## 2019-10-31 ENCOUNTER — OFFICE VISIT (OUTPATIENT)
Dept: INFECTIOUS DISEASES | Facility: CLINIC | Age: 64
End: 2019-10-31
Payer: MEDICARE

## 2019-10-31 VITALS
WEIGHT: 284 LBS | HEIGHT: 63 IN | HEART RATE: 85 BPM | BODY MASS INDEX: 50.32 KG/M2 | DIASTOLIC BLOOD PRESSURE: 74 MMHG | OXYGEN SATURATION: 96 % | TEMPERATURE: 98 F | SYSTOLIC BLOOD PRESSURE: 157 MMHG

## 2019-10-31 DIAGNOSIS — Z96.651 ARTIFICIAL KNEE JOINT PRESENT, RIGHT: ICD-10-CM

## 2019-10-31 DIAGNOSIS — M17.11 PRIMARY OSTEOARTHRITIS OF RIGHT KNEE: ICD-10-CM

## 2019-10-31 DIAGNOSIS — T81.31XD SURGICAL WOUND DEHISCENCE, SUBSEQUENT ENCOUNTER: ICD-10-CM

## 2019-10-31 DIAGNOSIS — A49.8 PROTEUS INFECTION: ICD-10-CM

## 2019-10-31 DIAGNOSIS — E66.01 MORBID OBESITY: ICD-10-CM

## 2019-10-31 DIAGNOSIS — Z79.2 ENCOUNTER FOR LONG-TERM (CURRENT) USE OF ANTIBIOTICS: ICD-10-CM

## 2019-10-31 DIAGNOSIS — M00.9 INFECTION OF RIGHT KNEE: Primary | ICD-10-CM

## 2019-10-31 PROCEDURE — 99214 OFFICE O/P EST MOD 30 MIN: CPT | Mod: S$GLB,,, | Performed by: INTERNAL MEDICINE

## 2019-10-31 PROCEDURE — 1101F PT FALLS ASSESS-DOCD LE1/YR: CPT | Mod: S$GLB,,, | Performed by: INTERNAL MEDICINE

## 2019-10-31 PROCEDURE — 99214 PR OFFICE/OUTPT VISIT, EST, LEVL IV, 30-39 MIN: ICD-10-PCS | Mod: S$GLB,,, | Performed by: INTERNAL MEDICINE

## 2019-10-31 PROCEDURE — 1101F PR PT FALLS ASSESS DOC 0-1 FALLS W/OUT INJ PAST YR: ICD-10-PCS | Mod: S$GLB,,, | Performed by: INTERNAL MEDICINE

## 2019-10-31 PROCEDURE — 1111F PR DISCHARGE MEDS RECONCILED W/ CURRENT OUTPATIENT MED LIST: ICD-10-PCS | Mod: S$GLB,,, | Performed by: INTERNAL MEDICINE

## 2019-10-31 PROCEDURE — 1111F DSCHRG MED/CURRENT MED MERGE: CPT | Mod: S$GLB,,, | Performed by: INTERNAL MEDICINE

## 2019-10-31 RX ORDER — LEVOFLOXACIN 500 MG/1
500 TABLET, FILM COATED ORAL DAILY
Qty: 30 TABLET | Refills: 5 | Status: SHIPPED | OUTPATIENT
Start: 2019-10-31 | End: 2019-11-07

## 2019-10-31 NOTE — PROGRESS NOTES
"Subjective:       Patient ID: Iris Mueller is a 64 y.o. female.    Chief Complaint:: Hospital Follow Up    HPI underwent a right TKA on 2019 by Dr. Chung. She  presented to ED on 2019 with  bloody and foul-smelling drainage  from right knee incision which was partially dehisced after hematoma was expressed from the wound.  She required debridement, suffered a sepsis syndrome and required a short ICU stay with wound cultures growing Proteus, Klebsiella and E coli.  This wound infection was felt to be superficial and not involve the joint and she was ultimately discharged to skilled nursing facility on Rocephin.  She was sent back to the hospital for revision of the wound after it deteriorated and was taken to surgery on  where it was debrided and wound VAC was placed . Again, it was felt that the infection did not involve the joint.   New cultures grew Proteus and Stenotrophomonas (sens to Levaquin). She is transferred to the LT for IV Rocephin and po Levaquin until her leg heals.     She was at LTAC until 10/8 and since then has been on levaquin po.  She saw Dr. Moreno last week who was discouraged by appearance of non viable tendon and risk of wound healing issues associated with a flap.   She is smoking about 2 cigarettes a day and working "very hard" on stopping.   No antibiotic intolerance thus far, levaquin 500 mg. (on IV from -10/8). No breach of joint was suspected and she has not behaved like an infected joint.  We discussed that she will be treated similarly as if the joint had been contaminated.  Wall    Review of patient's allergies indicates:  No Known Allergies  Past Medical History:   Diagnosis Date    Arthritis     Asthma     Encounter for blood transfusion     Hypertension    osteoarthritis  Wound infection right knee 2019  Smoker    Past Surgical History:   Procedure Laterality Date     SECTION      JOINT REPLACEMENT      Knee    KNEE " "ARTHROPLASTY Right 8/14/2019    Procedure: ARTHROPLASTY, KNEE;  Surgeon: Delio Chung MD;  Location: [facility] OR;  Service: Orthopedics;  Laterality: Right;  anesthesia:  general and block    REPLACEMENT OF WOUND VACUUM-ASSISTED CLOSURE DEVICE Right 9/12/2019    Procedure: REPLACEMENT, WOUND VAC;  Surgeon: Delio Chung MD;  Location: [facility] OR;  Service: Orthopedics;  Laterality: Right;    TONSILLECTOMY       Social History     Tobacco Use    Smoking status: Light Tobacco Smoker     Packs/day: 0.50     Years: 30.00     Pack years: 15.00     Types: Cigarettes    Smokeless tobacco: Never Used   Substance Use Topics    Alcohol use: Yes     Comment: RARELY     Social History     Occupational History    Not on file     Family History   Problem Relation Age of Onset    Alzheimer's disease Mother          Review of Systems    Constitutional: No fever, chills, sweats, fatigue, weakness,     Eyes: No change in vision,      ENT: No sore throat, mouth pains, or lesions    Cardiovascular: No chest pain, VALERIO, or worsening pedal edema    Respiratory: No shortness of breath, VALERIO, and she is working hard on stopping smoking    Gastrointestinal: No abdominal pain, nausea, vomiting, diarrhea,      Genitourinary: No dysuria, hematuria, and no vaginitis    Musculoskeletal: No new pain, joint swelling,      Integumentary: No new rashes, skin lesions,      Neurological: No dizziness, vertigo, unusual headaches, neuropathy, loss of vision, falls    Psychiatric: No anxiety, depression    Endocrine:      Lymphatic: No lymphadenopathy, blood loss, , malignancy    VAD:     Objective:      Blood pressure (!) 157/74, pulse 85, temperature 97.9 °F (36.6 °C), temperature source Temporal, height 5' 3" (1.6 m), weight 128.8 kg (284 lb), SpO2 96 %. Body mass index is 50.31 kg/m².  Physical Exam      General: Alert and attentive, cooperative and in no distress    Eyes: Pupils equal, round, reactive to light, anicteric, " EOMI    Neck: Supple, non-tender, no thyromegaly or masses    ENT: EAC patent,   nares patent, no oral lesions, teeth in good condition, no thrush    Cardiovascular: Regular rate and rhythm, no murmurs, rubs, or gallop    Respiratory: Lungs clear without wheezes, rales, rubs or rhonci    Gastrointestinal:  Soft, bowel sounds normal,  no mass or organomegaly, no tenderness or distention    Genitourinary:  no flank tenderness    Integumentary: Skin without rashes      Vascular:  Mild but improved peripheral edema  , warm and well perfused    Musculoskeletal:  In a wheelchair no acute arthritis, synovitis or myositis.     Lymphatic:      Neurological: Normal LOC, cranial nerves, speech, reflexes, gait    Psychiatric: Normal mood, speech,  demeanor     Wound:    Wound VAC in place at this time, photo from 10/25      VAD:        Recent Diagnostics:          Assessment and Plan:           Infection of right knee    Surgical wound dehiscence, subsequent encounter    Proteus infection    Artificial knee joint present, right    Primary osteoarthritis of right knee    Morbid obesity    Encounter for long-term (current) use of antibiotics    Other orders  -     levoFLOXacin (LEVAQUIN) 500 MG tablet; Take 1 tablet (500 mg total) by mouth once daily. for 7 days  Dispense: 30 tablet; Refill: 5      Do not take the levaquin(levofloxacin) and the iron at the same time  Continue levofloxacin until your knee wound is closed or 6 months have passed    Please take a probiotic while you are on antibiotics plus a few weeks. Examples of probiotics include brand names such as Align, Floraster and Culturelle, but generic versions are ok too. Kefir is a milk product that is also an excellent probiotic and can be taken as 1/4 cup three times a day with meals.     We will ask [facility] to send me a copy of your labs    Return in 6 weeks    This note was created using Dragon voice recognition software that occasionally misinterpreted phrases  or words.

## 2019-10-31 NOTE — PATIENT INSTRUCTIONS
Do not take the levaquin(levofloxacin) and the iron at the same time  Continue levofloxacin until your knee wound is closed or 6 months have passed    Please take a probiotic while you are on antibiotics plus a few weeks. Examples of probiotics include brand names such as Align, Floraster and Culturelle, but generic versions are ok too. Kefir is a milk product that is also an excellent probiotic and can be taken as 1/4 cup three times a day with meals.     We will ask [facility] to send me a copy of your labs    Return in 6 weeks

## 2019-11-01 ENCOUNTER — TELEPHONE (OUTPATIENT)
Dept: FAMILY MEDICINE | Facility: CLINIC | Age: 64
End: 2019-11-01

## 2019-11-01 PROBLEM — Z96.651 ARTIFICIAL KNEE JOINT PRESENT, RIGHT: Status: ACTIVE | Noted: 2019-11-01

## 2019-11-01 NOTE — TELEPHONE ENCOUNTER
Please call pt and let her know HH labs look good. Anemia has improved. Kidney function is stable. Normal thyroid, liver and cholesterol levels are well controlled

## 2019-11-01 NOTE — TELEPHONE ENCOUNTER
----- Message from Ora Gregory LPN sent at 11/1/2019  1:27 PM CDT -----      ----- Message -----  From: Francheska Carnes  Sent: 11/1/2019  10:22 AM CDT  To: Roselyn Cote Staff    LAB, [place], 10/30/19

## 2019-11-08 ENCOUNTER — DOCUMENTATION ONLY (OUTPATIENT)
Dept: REHABILITATION | Facility: HOSPITAL | Age: 64
End: 2019-11-08

## 2019-11-08 PROBLEM — M17.11 OSTEOARTHRITIS OF RIGHT KNEE: Status: RESOLVED | Noted: 2019-08-14 | Resolved: 2019-11-08

## 2019-11-08 NOTE — PROGRESS NOTES
Patient seen for 4 visit/s; did not return to physical therapy appts due to change in medical status.    PT Goals not addressed.   Pt will be d/c from current plan of care at this time.    Teofilo Cameron, PT

## 2019-11-12 ENCOUNTER — OFFICE VISIT (OUTPATIENT)
Dept: ORTHOPEDICS | Facility: CLINIC | Age: 64
End: 2019-11-12
Payer: MEDICARE

## 2019-11-12 VITALS
HEIGHT: 63 IN | HEART RATE: 75 BPM | WEIGHT: 284 LBS | SYSTOLIC BLOOD PRESSURE: 122 MMHG | BODY MASS INDEX: 50.32 KG/M2 | DIASTOLIC BLOOD PRESSURE: 62 MMHG

## 2019-11-12 DIAGNOSIS — M00.9 INFECTION OF RIGHT KNEE: Primary | ICD-10-CM

## 2019-11-12 DIAGNOSIS — M17.11 PRIMARY OSTEOARTHRITIS OF RIGHT KNEE: ICD-10-CM

## 2019-11-12 DIAGNOSIS — Z96.651 S/P TOTAL KNEE REPLACEMENT, RIGHT: ICD-10-CM

## 2019-11-12 PROCEDURE — 99024 PR POST-OP FOLLOW-UP VISIT: ICD-10-PCS | Mod: S$GLB,,, | Performed by: ORTHOPAEDIC SURGERY

## 2019-11-12 PROCEDURE — 99024 POSTOP FOLLOW-UP VISIT: CPT | Mod: S$GLB,,, | Performed by: ORTHOPAEDIC SURGERY

## 2019-11-12 PROCEDURE — 99999 PR PBB SHADOW E&M-EST. PATIENT-LVL III: ICD-10-PCS | Mod: PBBFAC,,, | Performed by: ORTHOPAEDIC SURGERY

## 2019-11-12 PROCEDURE — 99999 PR PBB SHADOW E&M-EST. PATIENT-LVL III: CPT | Mod: PBBFAC,,, | Performed by: ORTHOPAEDIC SURGERY

## 2019-11-12 RX ORDER — HYDROCODONE BITARTRATE AND ACETAMINOPHEN 10; 325 MG/1; MG/1
1 TABLET ORAL EVERY 6 HOURS PRN
Qty: 60 TABLET | Refills: 0 | Status: SHIPPED | OUTPATIENT
Start: 2019-11-26 | End: 2019-11-13 | Stop reason: SDUPTHER

## 2019-11-12 NOTE — TELEPHONE ENCOUNTER
----- Message from Laurie Camacho sent at 11/12/2019  2:26 PM CST -----  Pt hydrocodone was sent to the wrong pharm please send to [facility]   Pt [phone]

## 2019-11-13 DIAGNOSIS — M17.11 PRIMARY OSTEOARTHRITIS OF RIGHT KNEE: ICD-10-CM

## 2019-11-13 RX ORDER — HYDROCODONE BITARTRATE AND ACETAMINOPHEN 10; 325 MG/1; MG/1
1 TABLET ORAL 2 TIMES DAILY
Qty: 60 TABLET | Refills: 0 | Status: SHIPPED | OUTPATIENT
Start: 2019-11-26 | End: 2019-12-26

## 2019-11-13 NOTE — TELEPHONE ENCOUNTER
Pt wants rx cancelled at [facility] and sent to [facility]

## 2019-11-15 NOTE — PROGRESS NOTES
Past Medical History:   Diagnosis Date    Arthritis     Asthma     Encounter for blood transfusion     Hypertension     Wound infection 2019    Right knee       Past Surgical History:   Procedure Laterality Date     SECTION      JOINT REPLACEMENT      Knee    KNEE ARTHROPLASTY Right 2019    Procedure: ARTHROPLASTY, KNEE;  Surgeon: Delio Chung MD;  Location: [facility] OR;  Service: Orthopedics;  Laterality: Right;  anesthesia:  general and block    REPLACEMENT OF WOUND VACUUM-ASSISTED CLOSURE DEVICE Right 2019    Procedure: REPLACEMENT, WOUND VAC;  Surgeon: Delio Chung MD;  Location: [facility] OR;  Service: Orthopedics;  Laterality: Right;    TONSILLECTOMY         Current Outpatient Medications   Medication Sig    ANORO ELLIPTA 62.5-25 mcg/actuation DsDv Inhale 1 puff into the lungs daily as needed (SOB).    atorvastatin (LIPITOR) 10 MG tablet Take 1 tablet (10 mg total) by mouth every evening.    buPROPion (WELLBUTRIN) 75 MG tablet Take 1 tablet (75 mg total) by mouth 2 (two) times daily.    ferrous sulfate (FEOSOL) 325 mg (65 mg iron) Tab tablet Take 1 tablet (325 mg total) by mouth once daily.    FLUoxetine 40 MG capsule Take 1 capsule (40 mg total) by mouth once daily.    furosemide (LASIX) 20 MG tablet Take 1 tablet (20 mg total) by mouth once daily.    gabapentin (NEURONTIN) 300 MG capsule Take 1 capsule (300 mg total) by mouth 3 (three) times daily.    levoFLOXacin (LEVAQUIN) 500 MG tablet Take 1 tablet (500 mg total) by mouth once daily. Take until seen in clinic by Orthopedic Surgery and Plastic Surgery    pantoprazole (PROTONIX) 40 MG tablet Take 1 tablet (40 mg total) by mouth once daily.    traZODone (DESYREL) 50 MG tablet Take 1 tablet (50 mg total) by mouth every evening.    [START ON 2019] HYDROcodone-acetaminophen (NORCO)  mg per tablet Take 1 tablet by mouth 2 (two) times daily.     No current facility-administered medications for this visit.       Facility-Administered Medications Ordered in Other Visits   Medication    lidocaine (PF) 10 mg/ml (1%) injection 5 mg       Review of patient's allergies indicates:  No Known Allergies    Family History   Problem Relation Age of Onset    Alzheimer's disease Mother        Social History     Socioeconomic History    Marital status:      Spouse name: Not on file    Number of children: Not on file    Years of education: Not on file    Highest education level: Not on file   Occupational History    Not on file   Social Needs    Financial resource strain: Not on file    Food insecurity:     Worry: Not on file     Inability: Not on file    Transportation needs:     Medical: Not on file     Non-medical: Not on file   Tobacco Use    Smoking status: Light Tobacco Smoker     Packs/day: 0.50     Years: 30.00     Pack years: 15.00     Types: Cigarettes    Smokeless tobacco: Never Used   Substance and Sexual Activity    Alcohol use: Yes     Comment: RARELY    Drug use: Never    Sexual activity: Not Currently   Lifestyle    Physical activity:     Days per week: Not on file     Minutes per session: Not on file    Stress: To some extent   Relationships    Social connections:     Talks on phone: Not on file     Gets together: Not on file     Attends Anabaptism service: Not on file     Active member of club or organization: Not on file     Attends meetings of clubs or organizations: Not on file     Relationship status: Not on file   Other Topics Concern    Not on file   Social History Narrative    Not on file       Chief Complaint:   Chief Complaint   Patient presents with    Post-op Evaluation     S/P I&D 9/12/19, TKA 8/14/19       Consulting Physician: No ref. provider found    History of present illness: This is a 64 y.o. female following up for I and D of right knee wound with placement of wound VAC on 09/12/2019.      Review of Systems:    Constitution: Denies chills, fever, and sweats.    HENT:  Denies headaches or blurry vision.    Cardiovascular: Denies chest pain or irregular heart beat.    Respiratory: Denies cough or shortness of breath.    Gastrointestinal: Denies abdominal pain, nausea, or vomiting.    Musculoskeletal:  Denies muscle cramps.    Neurological: Denies dizziness or focal weakness.    Psychiatric/Behavioral: Normal mental status.    Hematologic/Lymphatic: Denies bleeding problem or easy bruising/bleeding.    Skin: Denies rash or suspicious lesions.      Examination:    Vital Signs:    Vitals:    11/12/19 1104   BP: 122/62   Pulse: 75       Body mass index is 50.31 kg/m².    This a well-developed, well nourished patient in no acute distress.    Alert and oriented and cooperative to examination.       Physical Exam:  Right knee    Inspection/Observation   Swelling:   mild  Erythema:   none  Bruising:   none  Wounds:   Wound VAC in place. Good granulation tissue under wound VAC.  Drainage:  None    Range of Motion   Limited (brace)    Muscle Strength   4    Other   Sensation:  normal  Pulse:    palpable    Imaging:      Assessment: Infection of right knee    S/P total knee replacement, right        Plan: We will continue her wound VAC with changes every 2 days.  She needs to continue her antibiotics per the Infectious Disease recommendations. She has seen our plastic surgeon Dr. Sepulveda who recommends a flap. She is not interested in that at this time. She is back to see us in 6 weeks.      DISCLAIMER: This note may have been dictated using voice recognition software and may contain grammatical errors. Report sent to referring provider as required.

## 2019-11-19 ENCOUNTER — TELEPHONE (OUTPATIENT)
Dept: FAMILY MEDICINE | Facility: CLINIC | Age: 64
End: 2019-11-19

## 2019-11-19 NOTE — TELEPHONE ENCOUNTER
Spoke with Lashonda from [facility] and verbalized confirmation that per Dr. You he will follow up with [facility] orders.

## 2019-12-05 ENCOUNTER — TELEPHONE (OUTPATIENT)
Dept: FAMILY MEDICINE | Facility: CLINIC | Age: 64
End: 2019-12-05

## 2019-12-06 ENCOUNTER — TELEPHONE (OUTPATIENT)
Dept: ORTHOPEDICS | Facility: CLINIC | Age: 64
End: 2019-12-06

## 2019-12-06 ENCOUNTER — EXTERNAL HOME HEALTH (OUTPATIENT)
Dept: HOME HEALTH SERVICES | Facility: HOSPITAL | Age: 64
End: 2019-12-06
Payer: MEDICARE

## 2019-12-06 NOTE — TELEPHONE ENCOUNTER
Spoke to Julianna with Home Health. Advised Dr Chung does want the patient to continue with the wound vac. Verbalized understanding, will fax orders to be signed.

## 2019-12-06 NOTE — TELEPHONE ENCOUNTER
----- Message from Delio Chung MD sent at 12/6/2019  1:58 PM CST -----  Contact: Julianna Chairez    ----- Message -----  From: Van Camacho LPN  Sent: 12/6/2019   1:11 PM CST  To: Delio Chung MD    Do you want wound vac continued?  Please send response to Crystalvan Mace  ----- Message -----  From: Keyona Beaulieu  Sent: 12/6/2019   1:04 PM CST  To: Sheldon Kebede Staff    Type: Needs Medical Advice    Who Called:  Julianna Lundberg Call Back Number:    Additional Information: Requesting a call back from Nurse, regarding her re cerfication  is TODAY for wound vac does  still want to continue care ,please call

## 2019-12-11 ENCOUNTER — TELEPHONE (OUTPATIENT)
Dept: HOME HEALTH SERVICES | Facility: HOSPITAL | Age: 64
End: 2019-12-11

## 2019-12-12 ENCOUNTER — OFFICE VISIT (OUTPATIENT)
Dept: INFECTIOUS DISEASES | Facility: CLINIC | Age: 64
End: 2019-12-12
Payer: MEDICARE

## 2019-12-12 VITALS
OXYGEN SATURATION: 100 % | HEART RATE: 79 BPM | DIASTOLIC BLOOD PRESSURE: 59 MMHG | SYSTOLIC BLOOD PRESSURE: 124 MMHG | TEMPERATURE: 98 F | WEIGHT: 284 LBS | BODY MASS INDEX: 50.32 KG/M2 | HEIGHT: 63 IN

## 2019-12-12 DIAGNOSIS — M00.9 INFECTION OF RIGHT KNEE: Primary | ICD-10-CM

## 2019-12-12 DIAGNOSIS — A49.8 PROTEUS INFECTION: ICD-10-CM

## 2019-12-12 DIAGNOSIS — E66.01 MORBID OBESITY: ICD-10-CM

## 2019-12-12 DIAGNOSIS — Z79.2 ENCOUNTER FOR LONG-TERM (CURRENT) USE OF ANTIBIOTICS: ICD-10-CM

## 2019-12-12 PROCEDURE — 99213 OFFICE O/P EST LOW 20 MIN: CPT | Mod: S$GLB,,, | Performed by: INTERNAL MEDICINE

## 2019-12-12 PROCEDURE — 99213 PR OFFICE/OUTPT VISIT, EST, LEVL III, 20-29 MIN: ICD-10-PCS | Mod: S$GLB,,, | Performed by: INTERNAL MEDICINE

## 2019-12-12 PROCEDURE — 1101F PR PT FALLS ASSESS DOC 0-1 FALLS W/OUT INJ PAST YR: ICD-10-PCS | Mod: S$GLB,,, | Performed by: INTERNAL MEDICINE

## 2019-12-12 PROCEDURE — 1159F MED LIST DOCD IN RCRD: CPT | Mod: S$GLB,,, | Performed by: INTERNAL MEDICINE

## 2019-12-12 PROCEDURE — 1101F PT FALLS ASSESS-DOCD LE1/YR: CPT | Mod: S$GLB,,, | Performed by: INTERNAL MEDICINE

## 2019-12-12 PROCEDURE — 1159F PR MEDICATION LIST DOCUMENTED IN MEDICAL RECORD: ICD-10-PCS | Mod: S$GLB,,, | Performed by: INTERNAL MEDICINE
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
## 2019-12-12 NOTE — PATIENT INSTRUCTIONS
Do not take the levaquin(levofloxacin) and the iron at the same time  Continue levofloxacin until your knee wound is closed or 6 months have passed  3 months have passed.  If you feel as though you are having     Blood work next week. It is ok to send it to [facility]    Return in 6 weeks    Give up those last few cigarettes!

## 2019-12-30 ENCOUNTER — EXTERNAL HOME HEALTH (OUTPATIENT)
Dept: HOME HEALTH SERVICES | Facility: HOSPITAL | Age: 64
End: 2019-12-30
Payer: MEDICARE

## 2020-01-06 RX ORDER — HYDROCODONE BITARTRATE AND ACETAMINOPHEN 10; 325 MG/1; MG/1
1 TABLET ORAL EVERY 6 HOURS PRN
Qty: 60 TABLET | Refills: 0 | Status: SHIPPED | OUTPATIENT
Start: 2020-01-06 | End: 2020-01-21 | Stop reason: SDUPTHER

## 2020-01-06 RX ORDER — HYDROCODONE BITARTRATE AND ACETAMINOPHEN 10; 325 MG/1; MG/1
1 TABLET ORAL EVERY 12 HOURS PRN
Qty: 60 TABLET | Refills: 0 | Status: SHIPPED | OUTPATIENT
Start: 2020-01-06 | End: 2020-02-19 | Stop reason: SDUPTHER

## 2020-01-06 NOTE — TELEPHONE ENCOUNTER
----- Message from Roselyn Abarca sent at 1/6/2020 12:15 PM CST -----  Refill for Hydrocodone. [facility] [address]. Pt #[phone]

## 2020-01-06 NOTE — TELEPHONE ENCOUNTER
----- Message from Carmen Hemphill sent at 1/6/2020  4:27 PM CST -----  Contact: Patient   VM @ 4:15 refill on Hydrocodone to [facility] on [address] and [address].    # [phone]

## 2020-01-14 ENCOUNTER — OFFICE VISIT (OUTPATIENT)
Dept: ORTHOPEDICS | Facility: CLINIC | Age: 65
End: 2020-01-14
Payer: MEDICARE

## 2020-01-14 VITALS — RESPIRATION RATE: 18 BRPM | WEIGHT: 283.94 LBS | BODY MASS INDEX: 50.31 KG/M2 | HEIGHT: 63 IN

## 2020-01-14 DIAGNOSIS — M00.9 INFECTION OF RIGHT KNEE: Primary | ICD-10-CM

## 2020-01-14 DIAGNOSIS — Z96.651 S/P TOTAL KNEE REPLACEMENT, RIGHT: ICD-10-CM

## 2020-01-14 PROCEDURE — 1101F PT FALLS ASSESS-DOCD LE1/YR: CPT | Mod: CPTII,S$GLB,, | Performed by: ORTHOPAEDIC SURGERY

## 2020-01-14 PROCEDURE — 99213 PR OFFICE/OUTPT VISIT, EST, LEVL III, 20-29 MIN: ICD-10-PCS | Mod: S$GLB,,, | Performed by: ORTHOPAEDIC SURGERY

## 2020-01-14 PROCEDURE — 1159F MED LIST DOCD IN RCRD: CPT | Mod: S$GLB,,, | Performed by: ORTHOPAEDIC SURGERY

## 2020-01-14 PROCEDURE — 99999 PR PBB SHADOW E&M-EST. PATIENT-LVL III: ICD-10-PCS | Mod: PBBFAC,,, | Performed by: ORTHOPAEDIC SURGERY

## 2020-01-14 PROCEDURE — 1125F AMNT PAIN NOTED PAIN PRSNT: CPT | Mod: S$GLB,,, | Performed by: ORTHOPAEDIC SURGERY

## 2020-01-14 PROCEDURE — 1159F PR MEDICATION LIST DOCUMENTED IN MEDICAL RECORD: ICD-10-PCS | Mod: S$GLB,,, | Performed by: ORTHOPAEDIC SURGERY

## 2020-01-14 PROCEDURE — 99999 PR PBB SHADOW E&M-EST. PATIENT-LVL III: CPT | Mod: PBBFAC,,, | Performed by: ORTHOPAEDIC SURGERY

## 2020-01-14 PROCEDURE — 99213 OFFICE O/P EST LOW 20 MIN: CPT | Mod: S$GLB,,, | Performed by: ORTHOPAEDIC SURGERY

## 2020-01-14 PROCEDURE — 1125F PR PAIN SEVERITY QUANTIFIED, PAIN PRESENT: ICD-10-PCS | Mod: S$GLB,,, | Performed by: ORTHOPAEDIC SURGERY

## 2020-01-14 PROCEDURE — 1101F PR PT FALLS ASSESS DOC 0-1 FALLS W/OUT INJ PAST YR: ICD-10-PCS | Mod: CPTII,S$GLB,, | Performed by: ORTHOPAEDIC SURGERY

## 2020-01-16 NOTE — PROGRESS NOTES
Past Medical History:   Diagnosis Date    Arthritis     Asthma     Encounter for blood transfusion     Hypertension     Wound infection 2019    Right knee       Past Surgical History:   Procedure Laterality Date     SECTION      JOINT REPLACEMENT      Knee    KNEE ARTHROPLASTY Right 2019    Procedure: ARTHROPLASTY, KNEE;  Surgeon: Delio Chung MD;  Location: [facility] OR;  Service: Orthopedics;  Laterality: Right;  anesthesia:  general and block    REPLACEMENT OF WOUND VACUUM-ASSISTED CLOSURE DEVICE Right 2019    Procedure: REPLACEMENT, WOUND VAC;  Surgeon: Delio Chung MD;  Location: [facility] OR;  Service: Orthopedics;  Laterality: Right;    TONSILLECTOMY         Current Outpatient Medications   Medication Sig    ANORO ELLIPTA 62.5-25 mcg/actuation DsDv Inhale 1 puff into the lungs daily as needed (SOB).    atorvastatin (LIPITOR) 10 MG tablet Take 1 tablet (10 mg total) by mouth every evening.    buPROPion (WELLBUTRIN) 75 MG tablet Take 1 tablet (75 mg total) by mouth 2 (two) times daily.    ferrous sulfate (FEOSOL) 325 mg (65 mg iron) Tab tablet Take 1 tablet (325 mg total) by mouth once daily.    FLUoxetine 40 MG capsule Take 1 capsule (40 mg total) by mouth once daily.    furosemide (LASIX) 20 MG tablet Take 1 tablet (20 mg total) by mouth once daily.    gabapentin (NEURONTIN) 300 MG capsule Take 1 capsule (300 mg total) by mouth 3 (three) times daily.    HYDROcodone-acetaminophen (NORCO)  mg per tablet Take 1 tablet by mouth every 12 (twelve) hours as needed for Pain.    HYDROcodone-acetaminophen (NORCO)  mg per tablet Take 1 tablet by mouth every 6 (six) hours as needed for Pain.    levoFLOXacin (LEVAQUIN) 500 MG tablet Take 1 tablet (500 mg total) by mouth once daily. Take until seen in clinic by Orthopedic Surgery and Plastic Surgery    pantoprazole (PROTONIX) 40 MG tablet Take 1 tablet (40 mg total) by mouth once daily.    traZODone (DESYREL) 50 MG  tablet Take 1 tablet (50 mg total) by mouth every evening.     No current facility-administered medications for this visit.      Facility-Administered Medications Ordered in Other Visits   Medication    lidocaine (PF) 10 mg/ml (1%) injection 5 mg       Review of patient's allergies indicates:  No Known Allergies    Family History   Problem Relation Age of Onset    Alzheimer's disease Mother        Social History     Socioeconomic History    Marital status:      Spouse name: Not on file    Number of children: Not on file    Years of education: Not on file    Highest education level: Not on file   Occupational History    Not on file   Social Needs    Financial resource strain: Not on file    Food insecurity:     Worry: Not on file     Inability: Not on file    Transportation needs:     Medical: Not on file     Non-medical: Not on file   Tobacco Use    Smoking status: Light Tobacco Smoker     Packs/day: 0.50     Years: 30.00     Pack years: 15.00     Types: Cigarettes    Smokeless tobacco: Never Used   Substance and Sexual Activity    Alcohol use: Yes     Comment: RARELY    Drug use: Never    Sexual activity: Not Currently   Lifestyle    Physical activity:     Days per week: Not on file     Minutes per session: Not on file    Stress: To some extent   Relationships    Social connections:     Talks on phone: Not on file     Gets together: Not on file     Attends Hindu service: Not on file     Active member of club or organization: Not on file     Attends meetings of clubs or organizations: Not on file     Relationship status: Not on file   Other Topics Concern    Not on file   Social History Narrative    Not on file       Chief Complaint:   Chief Complaint   Patient presents with    Right Knee - Pain, Post-op Evaluation       Consulting Physician: No ref. provider found    History of present illness: This is a 64 y.o. female following up for I and D of right knee wound with placement of  wound VAC on 09/12/2019.  History of total knee on 08/14/2019.      Review of Systems:    Constitution: Denies chills, fever, and sweats.    HENT: Denies headaches or blurry vision.    Cardiovascular: Denies chest pain or irregular heart beat.    Respiratory: Denies cough or shortness of breath.    Gastrointestinal: Denies abdominal pain, nausea, or vomiting.    Musculoskeletal:  Denies muscle cramps.    Neurological: Denies dizziness or focal weakness.    Psychiatric/Behavioral: Normal mental status.    Hematologic/Lymphatic: Denies bleeding problem or easy bruising/bleeding.    Skin: Denies rash or suspicious lesions.      Examination:    Vital Signs:    Vitals:    01/14/20 1307   Resp: 18       Body mass index is 50.3 kg/m².    This a well-developed, well nourished patient in no acute distress.    Alert and oriented and cooperative to examination.       Physical Exam:  Right knee    Inspection/Observation   Swelling:   mild  Erythema:   none  Bruising:   none  Wounds:   Wound VAC in place. Good granulation tissue under wound VAC.  Drainage:  None    Range of Motion   0-120    Muscle Strength   4-4+    Other   Sensation:  normal  Pulse:    palpable    Imaging:      Assessment: Infection of right knee    S/P total knee replacement, right        Plan: We will continue her wound VAC with changes every 2 days.  She needs to continue her antibiotics per the Infectious Disease recommendations. She has seen our plastic surgeon Dr. Sepulveda who recommends a flap. She is not interested in that at this time. She is back to see us in 6 weeks.  Continue home therapy.      DISCLAIMER: This note may have been dictated using voice recognition software and may contain grammatical errors. Report sent to referring provider as required.

## 2020-01-21 ENCOUNTER — OFFICE VISIT (OUTPATIENT)
Dept: FAMILY MEDICINE | Facility: CLINIC | Age: 65
End: 2020-01-21
Payer: MEDICARE

## 2020-01-21 ENCOUNTER — TELEPHONE (OUTPATIENT)
Dept: FAMILY MEDICINE | Facility: CLINIC | Age: 65
End: 2020-01-21

## 2020-01-21 VITALS
BODY MASS INDEX: 48.02 KG/M2 | DIASTOLIC BLOOD PRESSURE: 70 MMHG | HEIGHT: 63 IN | SYSTOLIC BLOOD PRESSURE: 132 MMHG | WEIGHT: 271 LBS | HEART RATE: 72 BPM

## 2020-01-21 DIAGNOSIS — Z12.31 SCREENING MAMMOGRAM, ENCOUNTER FOR: ICD-10-CM

## 2020-01-21 DIAGNOSIS — M25.561 RIGHT KNEE PAIN, UNSPECIFIED CHRONICITY: ICD-10-CM

## 2020-01-21 DIAGNOSIS — T81.49XA INFECTED INCISION: Primary | ICD-10-CM

## 2020-01-21 DIAGNOSIS — E78.5 DYSLIPIDEMIA: ICD-10-CM

## 2020-01-21 DIAGNOSIS — Z12.11 COLON CANCER SCREENING: ICD-10-CM

## 2020-01-21 DIAGNOSIS — E66.01 MORBID OBESITY: ICD-10-CM

## 2020-01-21 DIAGNOSIS — F33.0 MILD EPISODE OF RECURRENT MAJOR DEPRESSIVE DISORDER: ICD-10-CM

## 2020-01-21 DIAGNOSIS — M81.0 AGE-RELATED OSTEOPOROSIS WITHOUT CURRENT PATHOLOGICAL FRACTURE: ICD-10-CM

## 2020-01-21 DIAGNOSIS — F17.210 CIGARETTE SMOKER: ICD-10-CM

## 2020-01-21 DIAGNOSIS — M79.7 FIBROMYALGIA: Primary | ICD-10-CM

## 2020-01-21 DIAGNOSIS — J44.9 CHRONIC OBSTRUCTIVE PULMONARY DISEASE, UNSPECIFIED COPD TYPE: ICD-10-CM

## 2020-01-21 PROBLEM — Z01.89: Status: RESOLVED | Noted: 2019-10-09 | Resolved: 2020-01-21

## 2020-01-21 PROCEDURE — 1159F PR MEDICATION LIST DOCUMENTED IN MEDICAL RECORD: ICD-10-PCS | Mod: S$GLB,,, | Performed by: NURSE PRACTITIONER

## 2020-01-21 PROCEDURE — 3078F DIAST BP <80 MM HG: CPT | Mod: S$GLB,,, | Performed by: NURSE PRACTITIONER

## 2020-01-21 PROCEDURE — 99214 PR OFFICE/OUTPT VISIT, EST, LEVL IV, 30-39 MIN: ICD-10-PCS | Mod: S$GLB,,, | Performed by: NURSE PRACTITIONER

## 2020-01-21 PROCEDURE — 99214 OFFICE O/P EST MOD 30 MIN: CPT | Mod: S$GLB,,, | Performed by: NURSE PRACTITIONER

## 2020-01-21 PROCEDURE — 3075F PR MOST RECENT SYSTOLIC BLOOD PRESS GE 130-139MM HG: ICD-10-PCS | Mod: S$GLB,,, | Performed by: NURSE PRACTITIONER

## 2020-01-21 PROCEDURE — 1101F PT FALLS ASSESS-DOCD LE1/YR: CPT | Mod: S$GLB,,, | Performed by: NURSE PRACTITIONER

## 2020-01-21 PROCEDURE — 1159F MED LIST DOCD IN RCRD: CPT | Mod: S$GLB,,, | Performed by: NURSE PRACTITIONER

## 2020-01-21 PROCEDURE — 3075F SYST BP GE 130 - 139MM HG: CPT | Mod: S$GLB,,, | Performed by: NURSE PRACTITIONER

## 2020-01-21 PROCEDURE — 1101F PR PT FALLS ASSESS DOC 0-1 FALLS W/OUT INJ PAST YR: ICD-10-PCS | Mod: S$GLB,,, | Performed by: NURSE PRACTITIONER

## 2020-01-21 PROCEDURE — 3078F PR MOST RECENT DIASTOLIC BLOOD PRESSURE < 80 MM HG: ICD-10-PCS | Mod: S$GLB,,, | Performed by: NURSE PRACTITIONER

## 2020-01-21 RX ORDER — HYDROCODONE BITARTRATE AND ACETAMINOPHEN 10; 325 MG/1; MG/1
1 TABLET ORAL EVERY 8 HOURS PRN
Qty: 90 TABLET | Refills: 0 | Status: SHIPPED | OUTPATIENT
Start: 2020-01-21 | End: 2020-03-16 | Stop reason: SDUPTHER

## 2020-01-21 RX ORDER — BUPROPION HYDROCHLORIDE 150 MG/1
150 TABLET, EXTENDED RELEASE ORAL 2 TIMES DAILY
Qty: 60 TABLET | Refills: 5 | Status: SHIPPED | OUTPATIENT
Start: 2020-01-21 | End: 2020-04-21 | Stop reason: SDUPTHER

## 2020-01-21 NOTE — PROGRESS NOTES
SUBJECTIVE:    Patient ID: Iris Mueller is a 64 y.o. female.    Chief Complaint: Hypertension (COPD, no bottles// SW) and Follow-up (will check on C-scope, MAMMO, DEXA// SW)    Pt here for regular f/u. Reports right knee wound is slowly improving- still has wound vac in place and being changed by HH every other day. Remains on levaquin under care of Dr. To- has f/u with her in 2 weeks. Had gone to see Dr. Moreno, plastics however pt states she doesn't want another surgery. Pt reports wound is slowly decreasing in size. Continues with a lot of knee pain, ella with wound vac changes- requiring pain medication about 3 times a day. Working with [facility] PT and ambulating with walker- admits can't walk more than 100feet due to pain  Still smoking but down to 3-5 cigarettes a day on Wellbutrin      No results displayed because visit has over 200 results.      No results displayed because visit has over 200 results.      No results displayed because visit has over 200 results.      Admission on 08/23/2019, Discharged on 08/23/2019   Component Date Value Ref Range Status    WBC 08/23/2019 11.87  3.90 - 12.70 K/uL Final    RBC 08/23/2019 2.90* 4.00 - 5.40 M/uL Final    Hemoglobin 08/23/2019 9.1* 12.0 - 16.0 g/dL Final    Hematocrit 08/23/2019 27.8* 37.0 - 48.5 % Final    Mean Corpuscular Volume 08/23/2019 96  82 - 98 fL Final    Mean Corpuscular Hemoglobin 08/23/2019 31.4* 27.0 - 31.0 pg Final    Mean Corpuscular Hemoglobin Conc 08/23/2019 32.7  32.0 - 36.0 g/dL Final    RDW 08/23/2019 14.6* 11.5 - 14.5 % Final    Platelets 08/23/2019 428* 150 - 350 K/uL Final    MPV 08/23/2019 8.3* 9.2 - 12.9 fL Final    Gran # (ANC) 08/23/2019 9.5* 1.8 - 7.7 K/uL Final    Immature Grans (Abs) 08/23/2019 0.44* 0.00 - 0.04 K/uL Final    Lymph # 08/23/2019 1.1  1.0 - 4.8 K/uL Final    Mono # 08/23/2019 0.6  0.3 - 1.0 K/uL Final    Eos # 08/23/2019 0.2  0.0 - 0.5 K/uL Final    Baso # 08/23/2019 0.02   0.00 - 0.20 K/uL Final    nRBC 08/23/2019 0  0 /100 WBC Final    Gran% 08/23/2019 79.9* 38.0 - 73.0 % Final    Lymph% 08/23/2019 9.4* 18.0 - 48.0 % Final    Mono% 08/23/2019 5.0  4.0 - 15.0 % Final    Eosinophil% 08/23/2019 1.8  0.0 - 8.0 % Final    Basophil% 08/23/2019 0.2  0.0 - 1.9 % Final    Differential Method 08/23/2019 Automated   Final    Sodium 08/23/2019 128* 136 - 145 mmol/L Final    Potassium 08/23/2019 3.7  3.5 - 5.1 mmol/L Final    Chloride 08/23/2019 93* 95 - 110 mmol/L Final    CO2 08/23/2019 23  23 - 29 mmol/L Final    Glucose 08/23/2019 103  70 - 110 mg/dL Final    BUN, Bld 08/23/2019 21  8 - 23 mg/dL Final    Creatinine 08/23/2019 1.2  0.5 - 1.4 mg/dL Final    Calcium 08/23/2019 9.2  8.7 - 10.5 mg/dL Final    Total Protein 08/23/2019 6.2  6.0 - 8.4 g/dL Final    Albumin 08/23/2019 2.8* 3.5 - 5.2 g/dL Final    Total Bilirubin 08/23/2019 0.7  0.1 - 1.0 mg/dL Final    Alkaline Phosphatase 08/23/2019 131  55 - 135 U/L Final    AST 08/23/2019 13  10 - 40 U/L Final    ALT 08/23/2019 14  10 - 44 U/L Final    Anion Gap 08/23/2019 12  8 - 16 mmol/L Final    eGFR if  08/23/2019 55* >60 mL/min/1.73 m^2 Final    eGFR if non  08/23/2019 48* >60 mL/min/1.73 m^2 Final    BNP 08/23/2019 199* 0 - 99 pg/mL Final   Admission on 08/14/2019, Discharged on 08/15/2019   Component Date Value Ref Range Status    Sodium 08/14/2019 132* 136 - 145 mmol/L Final    Sodium 08/15/2019 132* 136 - 145 mmol/L Final    Potassium 08/15/2019 5.5* 3.5 - 5.1 mmol/L Final    Chloride 08/15/2019 99  95 - 110 mmol/L Final    CO2 08/15/2019 25  23 - 29 mmol/L Final    Glucose 08/15/2019 119* 70 - 110 mg/dL Final    BUN, Bld 08/15/2019 21  8 - 23 mg/dL Final    Creatinine 08/15/2019 1.4  0.5 - 1.4 mg/dL Final    Calcium 08/15/2019 9.0  8.7 - 10.5 mg/dL Final    Anion Gap 08/15/2019 8  8 - 16 mmol/L Final    eGFR if  08/15/2019 45* >60 mL/min/1.73 m^2 Final     eGFR if non African American 08/15/2019 39* >60 mL/min/1.73 m^2 Final    WBC 08/15/2019 19.64* 3.90 - 12.70 K/uL Final    RBC 08/15/2019 3.61* 4.00 - 5.40 M/uL Final    Hemoglobin 08/15/2019 11.1* 12.0 - 16.0 g/dL Final    Hematocrit 08/15/2019 34.6* 37.0 - 48.5 % Final    Mean Corpuscular Volume 08/15/2019 96  82 - 98 fL Final    Mean Corpuscular Hemoglobin 08/15/2019 30.7  27.0 - 31.0 pg Final    Mean Corpuscular Hemoglobin Conc 08/15/2019 32.1  32.0 - 36.0 g/dL Final    RDW 08/15/2019 13.6  11.5 - 14.5 % Final    Platelets 08/15/2019 433* 150 - 350 K/uL Final    MPV 08/15/2019 8.8* 9.2 - 12.9 fL Final    Gran # (ANC) 08/15/2019 17.7* 1.8 - 7.7 K/uL Final    Immature Grans (Abs) 08/15/2019 0.10* 0.00 - 0.04 K/uL Final    Lymph # 08/15/2019 0.9* 1.0 - 4.8 K/uL Final    Mono # 08/15/2019 0.9  0.3 - 1.0 K/uL Final    Eos # 08/15/2019 0.0  0.0 - 0.5 K/uL Final    Baso # 08/15/2019 0.02  0.00 - 0.20 K/uL Final    nRBC 08/15/2019 0  0 /100 WBC Final    Gran% 08/15/2019 90.1* 38.0 - 73.0 % Final    Lymph% 08/15/2019 4.7* 18.0 - 48.0 % Final    Mono% 08/15/2019 4.6  4.0 - 15.0 % Final    Eosinophil% 08/15/2019 0.0  0.0 - 8.0 % Final    Basophil% 08/15/2019 0.1  0.0 - 1.9 % Final    Differential Method 08/15/2019 Automated   Final    Magnesium 08/15/2019 2.0  1.6 - 2.6 mg/dL Final    Phosphorus 08/15/2019 4.6* 2.7 - 4.5 mg/dL Final   Hospital Outpatient Visit on 08/08/2019   Component Date Value Ref Range Status    Factor VIII Activity 08/08/2019 167  55 - 200 % Final    Von Willebrand Ag 08/08/2019 200  55 - 200 % Final    Von Willebrand Factor Activity 08/08/2019 141  55 - 200 % Final    VWF Profile Interpretation 08/08/2019 SEE BELOW   Final    VW Reviewed by: 08/08/2019 FEDERICO Wilde.   Final    Von Willebrand Multimers 08/08/2019 SEE BELOW   Final   Clinical Support on 08/08/2019   Component Date Value Ref Range Status    Target HR 08/08/2019 131.75  bpm Final    HR at rest  08/08/2019 70.0  bpm Final    Systolic blood pressure 08/08/2019 132.0  mmHg Final    Diastolic blood pressure 08/08/2019 80.0  mmHg Final    RPP 08/08/2019 9,240   Final    Peak HR 08/08/2019 92.0  bpm Final    Peak Systolic BP 08/08/2019 139.0  mmHg Final    Peak Diatolic BP 08/08/2019 73.0  mmHg Final    Peak RPP 08/08/2019 12,788   Final    85% Max Predicted HR 08/08/2019 126   Final    % Max HR Achieved 08/08/2019 62   Final    Max Predicted HR 08/08/2019 149   Final    OHS CV CPX PATIENT IS MALE 08/08/2019 0   Final    OHS CV CPX PATIENT IS FEMALE 08/08/2019 1   Final   Hospital Outpatient Visit on 08/01/2019   Component Date Value Ref Range Status    Sodium 08/01/2019 129* 136 - 145 mmol/L Final    Potassium 08/01/2019 3.3* 3.5 - 5.1 mmol/L Final    Chloride 08/01/2019 92* 95 - 110 mmol/L Final    CO2 08/01/2019 28  23 - 29 mmol/L Final    Glucose 08/01/2019 87  70 - 110 mg/dL Final    BUN, Bld 08/01/2019 13  8 - 23 mg/dL Final    Creatinine 08/01/2019 1.1  0.5 - 1.4 mg/dL Final    Calcium 08/01/2019 9.8  8.7 - 10.5 mg/dL Final    Anion Gap 08/01/2019 9  8 - 16 mmol/L Final    eGFR if  08/01/2019 >60  >60 mL/min/1.73 m^2 Final    eGFR if non African American 08/01/2019 53* >60 mL/min/1.73 m^2 Final    WBC 08/01/2019 9.13  3.90 - 12.70 K/uL Final    RBC 08/01/2019 4.26  4.00 - 5.40 M/uL Final    Hemoglobin 08/01/2019 12.9  12.0 - 16.0 g/dL Final    Hematocrit 08/01/2019 39.8  37.0 - 48.5 % Final    Mean Corpuscular Volume 08/01/2019 93  82 - 98 fL Final    Mean Corpuscular Hemoglobin 08/01/2019 30.3  27.0 - 31.0 pg Final    Mean Corpuscular Hemoglobin Conc 08/01/2019 32.4  32.0 - 36.0 g/dL Final    RDW 08/01/2019 13.8  11.5 - 14.5 % Final    Platelets 08/01/2019 400* 150 - 350 K/uL Final    MPV 08/01/2019 8.6* 9.2 - 12.9 fL Final    Gran # (ANC) 08/01/2019 6.4  1.8 - 7.7 K/uL Final    Immature Grans (Abs) 08/01/2019 0.07* 0.00 - 0.04 K/uL Final    Lymph #  2019 1.8  1.0 - 4.8 K/uL Final    Mono # 2019 0.6  0.3 - 1.0 K/uL Final    Eos # 2019 0.2  0.0 - 0.5 K/uL Final    Baso # 2019 0.05  0.00 - 0.20 K/uL Final    nRBC 2019 0  0 /100 WBC Final    Gran% 2019 69.5  38.0 - 73.0 % Final    Lymph% 2019 20.2  18.0 - 48.0 % Final    Mono% 2019 6.8  4.0 - 15.0 % Final    Eosinophil% 2019 2.2  0.0 - 8.0 % Final    Basophil% 2019 0.5  0.0 - 1.9 % Final    Differential Method 2019 Automated   Final    Group & Rh 2019 B NEG   Final    Indirect Felicia 2019 NEG   Final    MRSA Surveillance Screen 2019 No MRSA isolated   Final    Factor V Activity 2019 83  60 - 145 % Final       Past Medical History:   Diagnosis Date    Arthritis     Asthma     Encounter for blood transfusion     Hypertension     Wound infection 2019    Right knee     Past Surgical History:   Procedure Laterality Date     SECTION      JOINT REPLACEMENT      Knee    KNEE ARTHROPLASTY Right 2019    Procedure: ARTHROPLASTY, KNEE;  Surgeon: Delio Chung MD;  Location: [facility] OR;  Service: Orthopedics;  Laterality: Right;  anesthesia:  general and block    REPLACEMENT OF WOUND VACUUM-ASSISTED CLOSURE DEVICE Right 2019    Procedure: REPLACEMENT, WOUND VAC;  Surgeon: Delio Chung MD;  Location: [facility] OR;  Service: Orthopedics;  Laterality: Right;    TONSILLECTOMY       Family History   Problem Relation Age of Onset    Alzheimer's disease Mother        Marital Status:   Alcohol History:  reports that she drinks alcohol.  Tobacco History:  reports that she has been smoking cigarettes. She has a 15.00 pack-year smoking history. She has never used smokeless tobacco.  Drug History:  reports that she does not use drugs.    Review of patient's allergies indicates:  No Known Allergies    Current Outpatient Medications:     ANORO ELLIPTA 62.5-25 mcg/actuation DsDv, Inhale 1 puff into  the lungs daily as needed (SOB)., Disp: 1 each, Rfl: 5    atorvastatin (LIPITOR) 10 MG tablet, Take 1 tablet (10 mg total) by mouth every evening., Disp: 90 tablet, Rfl: 1    buPROPion (WELLBUTRIN SR) 150 MG TBSR 12 hr tablet, Take 1 tablet (150 mg total) by mouth 2 (two) times daily., Disp: 60 tablet, Rfl: 5    buPROPion (WELLBUTRIN) 75 MG tablet, Take 1 tablet (75 mg total) by mouth 2 (two) times daily., Disp: 180 tablet, Rfl: 1    ferrous sulfate (FEOSOL) 325 mg (65 mg iron) Tab tablet, Take 1 tablet (325 mg total) by mouth once daily., Disp: , Rfl: 0    FLUoxetine 40 MG capsule, Take 1 capsule (40 mg total) by mouth once daily., Disp: 90 capsule, Rfl: 1    furosemide (LASIX) 20 MG tablet, Take 1 tablet (20 mg total) by mouth once daily., Disp: 30 tablet, Rfl: 0    gabapentin (NEURONTIN) 300 MG capsule, Take 1 capsule (300 mg total) by mouth 3 (three) times daily., Disp: 90 capsule, Rfl: 5    HYDROcodone-acetaminophen (NORCO)  mg per tablet, Take 1 tablet by mouth every 12 (twelve) hours as needed for Pain., Disp: 60 tablet, Rfl: 0    HYDROcodone-acetaminophen (NORCO)  mg per tablet, Take 1 tablet by mouth every 6 (six) hours as needed for Pain., Disp: 60 tablet, Rfl: 0    levoFLOXacin (LEVAQUIN) 500 MG tablet, Take 1 tablet (500 mg total) by mouth once daily. Take until seen in clinic by Orthopedic Surgery and Plastic Surgery, Disp: 28 tablet, Rfl: 0    pantoprazole (PROTONIX) 40 MG tablet, Take 1 tablet (40 mg total) by mouth once daily., Disp: 90 tablet, Rfl: 1    traZODone (DESYREL) 50 MG tablet, Take 1 tablet (50 mg total) by mouth every evening., Disp: 30 tablet, Rfl: 0  No current facility-administered medications for this visit.     Facility-Administered Medications Ordered in Other Visits:     lidocaine (PF) 10 mg/ml (1%) injection 5 mg, 0.5 mL, Intradermal, Once, Azeem Anderson MD    Review of Systems   Constitutional: Negative for chills and fever. Unexpected weight change:  " weight down 13 lb since October visit.   Respiratory: Positive for shortness of breath ( mild with walking longer distance) and wheezing ( rarely at night). Negative for cough.    Cardiovascular: Negative for chest pain, palpitations and leg swelling.   Gastrointestinal: Negative for abdominal pain, constipation and diarrhea.   Genitourinary: Negative for dysuria and hematuria.   Musculoskeletal: Positive for arthralgias ( right knee pain).   Skin: Positive for wound. Negative for rash.   Neurological: Negative for dizziness and headaches.          Objective:      Vitals:    01/21/20 1433 01/21/20 1528   BP: (!) 148/70 132/70   Pulse: 72    Weight: 122.9 kg (271 lb)    Height: 5' 3" (1.6 m)      Physical Exam   Constitutional: She is oriented to person, place, and time. She appears well-developed and well-nourished.   Obese white female, ambulatory with Rollator   HENT:   Head: Normocephalic and atraumatic.   Right Ear: Tympanic membrane and ear canal normal.   Left Ear: Tympanic membrane and ear canal normal.   Mouth/Throat: Mucous membranes are normal. No posterior oropharyngeal erythema.   Neck: Neck supple. Carotid bruit is not present.   Cardiovascular: Normal rate and regular rhythm. Exam reveals no gallop and no friction rub.   No murmur heard.  Pulmonary/Chest: Effort normal. No respiratory distress. She has no wheezes. She has no rales.   Breath sounds slightly diminished throughout otherwise clear   Abdominal: Soft. She exhibits no distension. There is no tenderness.   Musculoskeletal:        Right knee: She exhibits no effusion and no erythema.   Right knee wound VAC in place, no surrounding erythema, no drainage noted in tubing   Lymphadenopathy:     She has no cervical adenopathy.   Neurological: She is alert and oriented to person, place, and time. She has normal strength. Gait normal.   Skin: Skin is warm and dry. No rash noted.   Psychiatric: She has a normal mood and affect.   Nursing note and " vitals reviewed.        Assessment:       1. Infected incision    2. Chronic obstructive pulmonary disease, unspecified COPD type    3. Morbid obesity    4. Mild episode of recurrent major depressive disorder    5. Cigarette smoker    6. Dyslipidemia    7. Age-related osteoporosis without current pathological fracture    8. Screening mammogram, encounter for    9. Colon cancer screening           Plan:       Infected incision  -wound slowly healing with wound VAC, no signs of infection today, continue with Levaquin and follow up Dr. To as scheduled    Chronic obstructive pulmonary disease, unspecified COPD type  -stable on anoro    Morbid obesity  -has had some weight loss since last visit though suspect most of was fluid related.  Encouraged her continue to work with therapy to become more mobile and also discussed low carb diet. weight reduction is main goal to help with mobility and knee pain    Mild episode of recurrent major depressive disorder  -     buPROPion (WELLBUTRIN SR) 150 MG TBSR 12 hr tablet; Take 1 tablet (150 mg total) by mouth 2 (two) times daily.  Dispense: 60 tablet; Refill: 5  -admits to some ongoing depression though denies any suicidal ideations.  Does feel the Wellbutrin has helped some with cutting back in smoking, increase dose to 150 mg b.i.d.    Cigarette smoker  -has cut back though still smoking 3-5 cigarettes a day, smoking cessation counseling provided.  Patient will be due for annual low-dose lung cancer screening CT scan after February    Dyslipidemia  -due for follow-up labs, orders to be sent to home health    Age-related osteoporosis without current pathological fracture  -review of DEXA scan in 2019 revealed osteoporosis.  Alendronate was prescribed however patient does not think it was ever started due to knee surgery and complications after surgery.  Patient not interested in starting any new medication at this time.  Recommended calcium and vitamin-D supplement and  weight-bearing exercise    Screening mammogram, encounter for    Colon cancer screening      Follow up in about 3 months (around 4/21/2020).        1/21/2020 Roselyn Cote NP

## 2020-01-21 NOTE — TELEPHONE ENCOUNTER
----- Message from Amaya Porter sent at 1/21/2020  3:43 PM CST -----  Contact: Iris   There are 2 Pharmacy's in this message.The patient  saw Roselyn CABALLERO today. She wants her pain medication called into [facility] on [facility]. The other Rxs called  into [facility] on [facility]. pts # [phone]

## 2020-01-23 ENCOUNTER — OFFICE VISIT (OUTPATIENT)
Dept: INFECTIOUS DISEASES | Facility: CLINIC | Age: 65
End: 2020-01-23
Payer: MEDICARE

## 2020-01-23 VITALS
OXYGEN SATURATION: 97 % | DIASTOLIC BLOOD PRESSURE: 66 MMHG | HEIGHT: 63 IN | WEIGHT: 267 LBS | BODY MASS INDEX: 47.31 KG/M2 | SYSTOLIC BLOOD PRESSURE: 99 MMHG | TEMPERATURE: 98 F | HEART RATE: 76 BPM

## 2020-01-23 DIAGNOSIS — A49.8 PROTEUS INFECTION: ICD-10-CM

## 2020-01-23 DIAGNOSIS — Z79.2 ENCOUNTER FOR LONG-TERM (CURRENT) USE OF ANTIBIOTICS: ICD-10-CM

## 2020-01-23 DIAGNOSIS — R19.7 DIARRHEA, UNSPECIFIED TYPE: ICD-10-CM

## 2020-01-23 DIAGNOSIS — M00.9 INFECTION OF RIGHT KNEE: Primary | ICD-10-CM

## 2020-01-23 PROCEDURE — 1101F PR PT FALLS ASSESS DOC 0-1 FALLS W/OUT INJ PAST YR: ICD-10-PCS | Mod: S$GLB,,, | Performed by: INTERNAL MEDICINE

## 2020-01-23 PROCEDURE — 1101F PT FALLS ASSESS-DOCD LE1/YR: CPT | Mod: S$GLB,,, | Performed by: INTERNAL MEDICINE

## 2020-01-23 PROCEDURE — 99213 PR OFFICE/OUTPT VISIT, EST, LEVL III, 20-29 MIN: ICD-10-PCS | Mod: S$GLB,,, | Performed by: INTERNAL MEDICINE

## 2020-01-23 PROCEDURE — 1159F PR MEDICATION LIST DOCUMENTED IN MEDICAL RECORD: ICD-10-PCS | Mod: S$GLB,,, | Performed by: INTERNAL MEDICINE

## 2020-01-23 PROCEDURE — 1125F AMNT PAIN NOTED PAIN PRSNT: CPT | Mod: S$GLB,,, | Performed by: INTERNAL MEDICINE

## 2020-01-23 PROCEDURE — 1159F MED LIST DOCD IN RCRD: CPT | Mod: S$GLB,,, | Performed by: INTERNAL MEDICINE

## 2020-01-23 PROCEDURE — 1125F PR PAIN SEVERITY QUANTIFIED, PAIN PRESENT: ICD-10-PCS | Mod: S$GLB,,, | Performed by: INTERNAL MEDICINE

## 2020-01-23 PROCEDURE — 3074F PR MOST RECENT SYSTOLIC BLOOD PRESSURE < 130 MM HG: ICD-10-PCS | Mod: S$GLB,,, | Performed by: INTERNAL MEDICINE

## 2020-01-23 PROCEDURE — 99213 OFFICE O/P EST LOW 20 MIN: CPT | Mod: S$GLB,,, | Performed by: INTERNAL MEDICINE

## 2020-01-23 PROCEDURE — 3078F DIAST BP <80 MM HG: CPT | Mod: S$GLB,,, | Performed by: INTERNAL MEDICINE

## 2020-01-23 PROCEDURE — 3078F PR MOST RECENT DIASTOLIC BLOOD PRESSURE < 80 MM HG: ICD-10-PCS | Mod: S$GLB,,, | Performed by: INTERNAL MEDICINE

## 2020-01-23 PROCEDURE — 3074F SYST BP LT 130 MM HG: CPT | Mod: S$GLB,,, | Performed by: INTERNAL MEDICINE

## 2020-01-23 NOTE — PATIENT INSTRUCTIONS
Please take a probiotic while you are on antibiotics plus a few weeks. Examples of probiotics include brand names such as Align, Floraster and Culturelle, but generic versions are ok too. Kefir is a milk product that is also an excellent probiotic and can be taken as 1/4 cup three times a day with meals.     Please stop the imodium. If you are having persistent loose stools, stop the levaquin and notify me and we will do a stool test    Please ask your  nurse to send me a copy of your lab    Continue the levaquin until 6 months(through the end of February) or the wound is closed    Return in 6 weeks

## 2020-01-23 NOTE — PROGRESS NOTES
"Subjective:       Patient ID: Iris Mueller is a 64 y.o. female.    Chief Complaint:: Right Knee Infection  10/31/19  HPI underwent a right TKA on 08/14/2019 by Dr. Chung. She  presented to ED on 08/29/2019 with  bloody and foul-smelling drainage  from right knee incision which was partially dehisced after hematoma was expressed from the wound.  She required debridement, suffered a sepsis syndrome and required a short ICU stay with wound cultures growing Proteus, Klebsiella and E coli.  This wound infection was felt to be superficial and not involve the joint and she was ultimately discharged to skilled nursing facility on Rocephin.  She was sent back to the hospital for revision of the wound after it deteriorated and was taken to surgery on 9/12 where it was debrided and wound VAC was placed . Again, it was felt that the infection did not involve the joint.   New cultures grew Proteus and Stenotrophomonas (sens to Levaquin). She is transferred to the [facility] for IV Rocephin and po Levaquin until her leg heals.     She was at LTAC until 10/8 and since then has been on levaquin po.  She saw Dr. Moreno last week who was discouraged by appearance of non viable tendon and risk of wound healing issues associated with a flap.   She is smoking about 2 cigarettes a day and working "very hard" on stopping.   No antibiotic intolerance thus far, levaquin 500 mg. (on IV from 8/30-10/8). No breach of joint was suspected and she has not behaved like an infected joint.  We discussed that she will be treated similarly as if the joint had been contaminated.  Wall    12/12/19: tolerating levaquin. Has been on antibiotics 8/30. Taking levaquin daily and no new joint/tendon pain. Rare diarrhea and no yeast infections. Doing well from a joint standpoint and ROM is improved. Still has a wound vac and tendon is almost closed. [facility]. Fell back sprague into her bathtub yesterday and fire department had to get her out. Bruise " right lower leg and ?left buttock. No head trauma.  Knee wound is slowly better an dthe tendon is almost covered.   20: wound is slowly improving, see photo below. She has intermittent diarrhea, for the last 2-3 weeks and took 4 imodium in the last 4 days. No cramps. Her wellbutrin was recently increased, but stool frequency picked up before this. She has been eating lots of dairy. Increased stress She has HH physical therapy. Lost 5#. Uses walker only for long distances.  Still smoking a few cigarettes per day. No tendonitis and no candidiasis.     Review of patient's allergies indicates:  No Known Allergies  Past Medical History:   Diagnosis Date    Arthritis     Asthma     Encounter for blood transfusion     Hypertension     Wound infection 2019    Right knee   osteoarthritis  Wound infection right knee 2019  Smoker    Past Surgical History:   Procedure Laterality Date     SECTION      JOINT REPLACEMENT      Knee    KNEE ARTHROPLASTY Right 2019    Procedure: ARTHROPLASTY, KNEE;  Surgeon: Delio Chung MD;  Location: [facility] OR;  Service: Orthopedics;  Laterality: Right;  anesthesia:  general and block    REPLACEMENT OF WOUND VACUUM-ASSISTED CLOSURE DEVICE Right 2019    Procedure: REPLACEMENT, WOUND VAC;  Surgeon: Delio Chung MD;  Location: [facility] OR;  Service: Orthopedics;  Laterality: Right;    TONSILLECTOMY       Social History     Tobacco Use    Smoking status: Light Tobacco Smoker     Packs/day: 0.50     Years: 30.00     Pack years: 15.00     Types: Cigarettes    Smokeless tobacco: Never Used   Substance Use Topics    Alcohol use: Yes     Comment: RARELY     Social History     Occupational History    Not on file     Family History   Problem Relation Age of Onset    Alzheimer's disease Mother          Review of Systems    Constitutional: No fever, chills, sweats, fatigue, weakness,     Eyes: No change in vision,      ENT: No sore throat, mouth pains, or  "lesions    Cardiovascular: No chest pain, VALERIO, or worsening pedal edema    Respiratory: No shortness of breath, VALERIO, and she is working hard on stopping smoking, still on 5-7 per day  Gastrointestinal: No abdominal pain, nausea, vomiting,      Genitourinary: No dysuria, hematuria, and no vaginitis    Musculoskeletal: No new pain, joint swelling,  ROM is 120 degrees    Integumentary: No new rashes, skin lesions,      Neurological: No dizziness, vertigo, unusual headaches, neuropathy, loss of vision, falls    Psychiatric: No anxiety, depression    Endocrine:      Lymphatic: No lymphadenopathy, blood loss, , malignancy    VAD:     Objective:      Blood pressure 99/66, pulse 76, temperature 98.1 °F (36.7 °C), temperature source Temporal, height 5' 3" (1.6 m), weight 121.1 kg (267 lb), SpO2 97 %. Body mass index is 47.3 kg/m².  Physical Exam      General: Alert and attentive, cooperative and in no distress    Eyes:  , anicteric, EOMI    Neck: Supple,      ENT: EAC patent,   nares patent, no oral lesions, teeth in good condition, no thrush    Cardiovascular:      Respiratory:      Gastrointestinal:       Genitourinary:      Integumentary: Skin without rashes      Vascular:  Mild but stable peripheral edema  , warm and well perfused    Musculoskeletal:  Using rolling walker,  no acute arthritis, synovitis or myositis. ROM is improved    Lymphatic:      Neurological: Normal LOC, cranial nerves, speech , gait    Psychiatric: Normal mood, speech,  demeanor     Wound:            photo from 10/25      12/11      1/23/20    VAD:      Recent Diagnostics:   Lab requested in dec not done, may be drawn this week       Assessment and Plan:           Infection of right knee    Proteus infection    Encounter for long-term (current) use of antibiotics  -     CBC auto differential; Future; Expected date: 01/23/2020  -     Comprehensive metabolic panel; Future; Expected date: 01/23/2020  -     C-reactive protein; Future; Expected date: " 01/23/2020    Diarrhea, unspecified type  -     Clostridium difficile EIA; Future; Expected date: 01/23/2020        Please take a probiotic while you are on antibiotics plus a few weeks. Examples of probiotics include brand names such as Align, Floraster and Culturelle, but generic versions are ok too. Kefir is a milk product that is also an excellent probiotic and can be taken as 1/4 cup three times a day with meals.     Please stop the imodium. If you are having persistent loose stools, stop the levaquin and notify me and we will do a stool test    Please ask your  nurse to send me a copy of your lab    Continue the levaquin until 6 months(through the end of February) or the wound is closed    Return in 6 weeks    This note was created using Dragon voice recognition software that occasionally misinterpreted phrases or words.

## 2020-01-24 ENCOUNTER — TELEPHONE (OUTPATIENT)
Dept: FAMILY MEDICINE | Facility: CLINIC | Age: 65
End: 2020-01-24

## 2020-01-24 NOTE — TELEPHONE ENCOUNTER
----- Message from Carolyne Khoury sent at 1/24/2020 10:52 AM CST -----  deloris-rin with [facility] is calling and they were not able to get her blood today. Would like to know if they can retry on monday  [phone]

## 2020-01-27 ENCOUNTER — EXTERNAL HOME HEALTH (OUTPATIENT)
Dept: HOME HEALTH SERVICES | Facility: HOSPITAL | Age: 65
End: 2020-01-27
Payer: MEDICARE

## 2020-01-28 ENCOUNTER — EXTERNAL HOME HEALTH (OUTPATIENT)
Dept: HOME HEALTH SERVICES | Facility: HOSPITAL | Age: 65
End: 2020-01-28
Payer: MEDICARE

## 2020-01-28 ENCOUNTER — EXTERNAL HOME HEALTH (OUTPATIENT)
Dept: HOME HEALTH SERVICES | Facility: HOSPITAL | Age: 65
End: 2020-01-28

## 2020-01-30 ENCOUNTER — TELEPHONE (OUTPATIENT)
Dept: FAMILY MEDICINE | Facility: CLINIC | Age: 65
End: 2020-01-30

## 2020-01-31 NOTE — TELEPHONE ENCOUNTER
Please call patient and let her know home health labs are stable.  Cholesterol levels well controlled.  Kidney function is stable and thyroid within normal range

## 2020-01-31 NOTE — TELEPHONE ENCOUNTER
----- Message from Tana Brown MA sent at 1/30/2020  2:54 PM CST -----      ----- Message -----  From: Francheska Carnes  Sent: 1/30/2020   1:45 PM CST  To: Elkin You Staff    LAB, [facility], 01/29/20

## 2020-02-03 ENCOUNTER — TELEPHONE (OUTPATIENT)
Dept: HOME HEALTH SERVICES | Facility: HOSPITAL | Age: 65
End: 2020-02-03

## 2020-02-03 NOTE — TELEPHONE ENCOUNTER
Returned call to Julianna, advised per Dr. Chung to please have wound care nurse eval patient for new treatment plan since wound vac no longer fits.

## 2020-02-03 NOTE — TELEPHONE ENCOUNTER
----- Message from Delio Chung MD sent at 2/3/2020  4:25 PM CST -----  Contact: lashell gar   Yes. Please eval for new treatment plan.    ----- Message -----  From: Karen Camacho LPN  Sent: 2/3/2020   4:02 PM CST  To: Delio Chung MD        ----- Message -----  From: Iliana Crowe MA  Sent: 2/3/2020   3:46 PM CST  To: Sheldon Kebede Staff    Wound healing, small, would Dr like wound care nurse to take a look at it   Call back    Wound vac does not fit any more.   Collagen or silver algenet

## 2020-02-12 ENCOUNTER — TELEPHONE (OUTPATIENT)
Dept: FAMILY MEDICINE | Facility: CLINIC | Age: 65
End: 2020-02-12

## 2020-02-12 NOTE — TELEPHONE ENCOUNTER
From [facility] on 2/21/2019:  Pr needs repeat LDCTS in 2/2020.     Spoke to patient that LDCTS is due, states that she has a lot going on right now, has wound vac on her knee. Said she will address this at ov with Roselyn in April. Made note in appt that this is due and created remind me to be sure it was ordered at her visit.

## 2020-02-18 ENCOUNTER — OFFICE VISIT (OUTPATIENT)
Dept: ORTHOPEDICS | Facility: CLINIC | Age: 65
End: 2020-02-18
Payer: MEDICARE

## 2020-02-18 VITALS — WEIGHT: 267 LBS | BODY MASS INDEX: 47.31 KG/M2 | HEIGHT: 63 IN | RESPIRATION RATE: 20 BRPM

## 2020-02-18 DIAGNOSIS — M00.9 INFECTION OF RIGHT KNEE: ICD-10-CM

## 2020-02-18 DIAGNOSIS — Z96.651 S/P TOTAL KNEE REPLACEMENT, RIGHT: Primary | ICD-10-CM

## 2020-02-18 PROCEDURE — 99213 OFFICE O/P EST LOW 20 MIN: CPT | Mod: S$GLB,,, | Performed by: ORTHOPAEDIC SURGERY

## 2020-02-18 PROCEDURE — 1125F AMNT PAIN NOTED PAIN PRSNT: CPT | Mod: S$GLB,,, | Performed by: ORTHOPAEDIC SURGERY

## 2020-02-18 PROCEDURE — 99999 PR PBB SHADOW E&M-EST. PATIENT-LVL III: ICD-10-PCS | Mod: PBBFAC,,, | Performed by: ORTHOPAEDIC SURGERY

## 2020-02-18 PROCEDURE — 1159F PR MEDICATION LIST DOCUMENTED IN MEDICAL RECORD: ICD-10-PCS | Mod: S$GLB,,, | Performed by: ORTHOPAEDIC SURGERY

## 2020-02-18 PROCEDURE — 1125F PR PAIN SEVERITY QUANTIFIED, PAIN PRESENT: ICD-10-PCS | Mod: S$GLB,,, | Performed by: ORTHOPAEDIC SURGERY

## 2020-02-18 PROCEDURE — 1101F PR PT FALLS ASSESS DOC 0-1 FALLS W/OUT INJ PAST YR: ICD-10-PCS | Mod: CPTII,S$GLB,, | Performed by: ORTHOPAEDIC SURGERY

## 2020-02-18 PROCEDURE — 1101F PT FALLS ASSESS-DOCD LE1/YR: CPT | Mod: CPTII,S$GLB,, | Performed by: ORTHOPAEDIC SURGERY

## 2020-02-18 PROCEDURE — 1159F MED LIST DOCD IN RCRD: CPT | Mod: S$GLB,,, | Performed by: ORTHOPAEDIC SURGERY

## 2020-02-18 PROCEDURE — 99213 PR OFFICE/OUTPT VISIT, EST, LEVL III, 20-29 MIN: ICD-10-PCS | Mod: S$GLB,,, | Performed by: ORTHOPAEDIC SURGERY

## 2020-02-18 PROCEDURE — 99999 PR PBB SHADOW E&M-EST. PATIENT-LVL III: CPT | Mod: PBBFAC,,, | Performed by: ORTHOPAEDIC SURGERY

## 2020-02-19 ENCOUNTER — EXTERNAL HOME HEALTH (OUTPATIENT)
Dept: HOME HEALTH SERVICES | Facility: HOSPITAL | Age: 65
End: 2020-02-19

## 2020-02-19 DIAGNOSIS — M79.7 FIBROMYALGIA: Primary | ICD-10-CM

## 2020-02-19 RX ORDER — HYDROCODONE BITARTRATE AND ACETAMINOPHEN 10; 325 MG/1; MG/1
1 TABLET ORAL EVERY 8 HOURS PRN
Qty: 90 TABLET | Refills: 0 | Status: SHIPPED | OUTPATIENT
Start: 2020-02-19 | End: 2020-04-16

## 2020-02-19 NOTE — TELEPHONE ENCOUNTER
----- Message from Maricel Boone sent at 2/19/2020 12:36 PM CST -----  Contact: Iris Ami  Needs refill on HYDROcodone-acetaminophen (NORCO)  mg per tablet  Send to [facility]  Pt# [phone]

## 2020-02-20 NOTE — PROGRESS NOTES
Past Medical History:   Diagnosis Date    Arthritis     Asthma     Encounter for blood transfusion     Hypertension     Wound infection 2019    Right knee       Past Surgical History:   Procedure Laterality Date     SECTION      JOINT REPLACEMENT      Knee    KNEE ARTHROPLASTY Right 2019    Procedure: ARTHROPLASTY, KNEE;  Surgeon: Delio Chung MD;  Location: [facility] OR;  Service: Orthopedics;  Laterality: Right;  anesthesia:  general and block    REPLACEMENT OF WOUND VACUUM-ASSISTED CLOSURE DEVICE Right 2019    Procedure: REPLACEMENT, WOUND VAC;  Surgeon: Delio Chung MD;  Location: [facility] OR;  Service: Orthopedics;  Laterality: Right;    TONSILLECTOMY         Current Outpatient Medications   Medication Sig    ANORO ELLIPTA 62.5-25 mcg/actuation DsDv Inhale 1 puff into the lungs daily as needed (SOB).    atorvastatin (LIPITOR) 10 MG tablet Take 1 tablet (10 mg total) by mouth every evening.    buPROPion (WELLBUTRIN SR) 150 MG TBSR 12 hr tablet Take 1 tablet (150 mg total) by mouth 2 (two) times daily.    buPROPion (WELLBUTRIN) 75 MG tablet Take 1 tablet (75 mg total) by mouth 2 (two) times daily.    ferrous sulfate (FEOSOL) 325 mg (65 mg iron) Tab tablet Take 1 tablet (325 mg total) by mouth once daily.    FLUoxetine 40 MG capsule Take 1 capsule (40 mg total) by mouth once daily.    furosemide (LASIX) 20 MG tablet Take 1 tablet (20 mg total) by mouth once daily. (Patient taking differently: Take 20 mg by mouth continuous prn. )    gabapentin (NEURONTIN) 300 MG capsule Take 1 capsule (300 mg total) by mouth 3 (three) times daily.    HYDROcodone-acetaminophen (NORCO)  mg per tablet Take 1 tablet by mouth every 8 (eight) hours as needed for Pain.    levoFLOXacin (LEVAQUIN) 500 MG tablet Take 1 tablet (500 mg total) by mouth once daily. Take until seen in clinic by Orthopedic Surgery and Plastic Surgery    pantoprazole (PROTONIX) 40 MG tablet Take 1 tablet (40 mg  total) by mouth once daily.    traZODone (DESYREL) 50 MG tablet Take 1 tablet (50 mg total) by mouth every evening. (Patient taking differently: Take 50 mg by mouth continuous prn. )    HYDROcodone-acetaminophen (NORCO)  mg per tablet Take 1 tablet by mouth every 8 (eight) hours as needed for Pain.     No current facility-administered medications for this visit.      Facility-Administered Medications Ordered in Other Visits   Medication    lidocaine (PF) 10 mg/ml (1%) injection 5 mg       Review of patient's allergies indicates:  No Known Allergies    Family History   Problem Relation Age of Onset    Alzheimer's disease Mother        Social History     Socioeconomic History    Marital status:      Spouse name: Not on file    Number of children: Not on file    Years of education: Not on file    Highest education level: Not on file   Occupational History    Not on file   Social Needs    Financial resource strain: Not on file    Food insecurity:     Worry: Not on file     Inability: Not on file    Transportation needs:     Medical: Not on file     Non-medical: Not on file   Tobacco Use    Smoking status: Light Tobacco Smoker     Packs/day: 0.50     Years: 30.00     Pack years: 15.00     Types: Cigarettes    Smokeless tobacco: Never Used   Substance and Sexual Activity    Alcohol use: Yes     Comment: RARELY    Drug use: Never    Sexual activity: Not Currently   Lifestyle    Physical activity:     Days per week: Not on file     Minutes per session: Not on file    Stress: To some extent   Relationships    Social connections:     Talks on phone: Not on file     Gets together: Not on file     Attends Christianity service: Not on file     Active member of club or organization: Not on file     Attends meetings of clubs or organizations: Not on file     Relationship status: Not on file   Other Topics Concern    Not on file   Social History Narrative    Not on file       Chief Complaint:   Chief  Complaint   Patient presents with    Right Knee - Pain, Post-op Evaluation       Consulting Physician: No ref. provider found    History of present illness: This is a 64 y.o. female following up for I and D of right knee wound with placement of wound VAC on 09/12/2019.  History of total knee on 08/14/2019.      Review of Systems:    Constitution: Denies chills, fever, and sweats.    HENT: Denies headaches or blurry vision.    Cardiovascular: Denies chest pain or irregular heart beat.    Respiratory: Denies cough or shortness of breath.    Gastrointestinal: Denies abdominal pain, nausea, or vomiting.    Musculoskeletal:  Denies muscle cramps.    Neurological: Denies dizziness or focal weakness.    Psychiatric/Behavioral: Normal mental status.    Hematologic/Lymphatic: Denies bleeding problem or easy bruising/bleeding.    Skin: Denies rash or suspicious lesions.      Examination:    Vital Signs:    Vitals:    02/18/20 1525   Resp: 20       Body mass index is 47.29 kg/m².    This a well-developed, well nourished patient in no acute distress.    Alert and oriented and cooperative to examination.       Physical Exam:  Right knee    Inspection/Observation   Swelling:   mild  Erythema:   none  Bruising:   none  Wounds:    Good granulation tissue with epithelialization  Drainage:  None    Range of Motion   0-120+    Muscle Strength   4+    Other   Sensation:  normal  Pulse:    palpable    Imaging:      Assessment: S/P total knee replacement, right    Infection of right knee        Plan:  Her wound is completely closed and starting to cover over with skin. She has good range of motion and minimal pain. She says the only pain she has comes from the wound itself and not from the knee. At this point she is doing very well. We discussed physical therapy and she would like to give this some consideration.  In the meantime she will do a home exercise program.  We will see her back in 6 weeks.    DISCLAIMER: This note may have  been dictated using voice recognition software and may contain grammatical errors. Report sent to referring provider as required.

## 2020-02-27 ENCOUNTER — TELEPHONE (OUTPATIENT)
Dept: HOME HEALTH SERVICES | Facility: HOSPITAL | Age: 65
End: 2020-02-27

## 2020-03-05 ENCOUNTER — OFFICE VISIT (OUTPATIENT)
Dept: INFECTIOUS DISEASES | Facility: CLINIC | Age: 65
End: 2020-03-05
Payer: MEDICARE

## 2020-03-05 VITALS
DIASTOLIC BLOOD PRESSURE: 81 MMHG | SYSTOLIC BLOOD PRESSURE: 112 MMHG | BODY MASS INDEX: 47.13 KG/M2 | HEIGHT: 63 IN | OXYGEN SATURATION: 98 % | WEIGHT: 266 LBS | HEART RATE: 78 BPM | TEMPERATURE: 98 F

## 2020-03-05 DIAGNOSIS — Z79.2 ENCOUNTER FOR LONG-TERM (CURRENT) USE OF ANTIBIOTICS: ICD-10-CM

## 2020-03-05 DIAGNOSIS — A49.8 PROTEUS INFECTION: ICD-10-CM

## 2020-03-05 DIAGNOSIS — M00.9 INFECTION OF RIGHT KNEE: Primary | ICD-10-CM

## 2020-03-05 PROCEDURE — 99213 PR OFFICE/OUTPT VISIT, EST, LEVL III, 20-29 MIN: ICD-10-PCS | Mod: S$GLB,,, | Performed by: INTERNAL MEDICINE

## 2020-03-05 PROCEDURE — 3074F SYST BP LT 130 MM HG: CPT | Mod: S$GLB,,, | Performed by: INTERNAL MEDICINE

## 2020-03-05 PROCEDURE — 1101F PT FALLS ASSESS-DOCD LE1/YR: CPT | Mod: S$GLB,,, | Performed by: INTERNAL MEDICINE

## 2020-03-05 PROCEDURE — 1159F MED LIST DOCD IN RCRD: CPT | Mod: S$GLB,,, | Performed by: INTERNAL MEDICINE

## 2020-03-05 PROCEDURE — 1159F PR MEDICATION LIST DOCUMENTED IN MEDICAL RECORD: ICD-10-PCS | Mod: S$GLB,,, | Performed by: INTERNAL MEDICINE

## 2020-03-05 PROCEDURE — 3079F DIAST BP 80-89 MM HG: CPT | Mod: S$GLB,,, | Performed by: INTERNAL MEDICINE

## 2020-03-05 PROCEDURE — 3079F PR MOST RECENT DIASTOLIC BLOOD PRESSURE 80-89 MM HG: ICD-10-PCS | Mod: S$GLB,,, | Performed by: INTERNAL MEDICINE

## 2020-03-05 PROCEDURE — 99213 OFFICE O/P EST LOW 20 MIN: CPT | Mod: S$GLB,,, | Performed by: INTERNAL MEDICINE

## 2020-03-05 PROCEDURE — 3074F PR MOST RECENT SYSTOLIC BLOOD PRESSURE < 130 MM HG: ICD-10-PCS | Mod: S$GLB,,, | Performed by: INTERNAL MEDICINE

## 2020-03-05 PROCEDURE — 1101F PR PT FALLS ASSESS DOC 0-1 FALLS W/OUT INJ PAST YR: ICD-10-PCS | Mod: S$GLB,,, | Performed by: INTERNAL MEDICINE

## 2020-03-05 NOTE — PROGRESS NOTES
"Subjective:       Patient ID: Iris Mueller is a 64 y.o. female.    Chief Complaint:: Infection of right knee  10/31/19  HPI underwent a right TKA on 08/14/2019 by Dr. Chung. She  presented to ED on 08/29/2019 with  bloody and foul-smelling drainage  from right knee incision which was partially dehisced after hematoma was expressed from the wound.  She required debridement, suffered a sepsis syndrome and required a short ICU stay with wound cultures growing Proteus, Klebsiella and E coli.  This wound infection was felt to be superficial and not involve the joint and she was ultimately discharged to skilled nursing facility on Rocephin.  She was sent back to the hospital for revision of the wound after it deteriorated and was taken to surgery on 9/12 where it was debrided and wound VAC was placed . Again, it was felt that the infection did not involve the joint.   New cultures grew Proteus and Stenotrophomonas (sens to Levaquin). She is transferred to the [facility] for IV Rocephin and po Levaquin until her leg heals.     She was at LTAC until 10/8 and since then has been on levaquin po.  She saw Dr. Moreno last week who was discouraged by appearance of non viable tendon and risk of wound healing issues associated with a flap.   She is smoking about 2 cigarettes a day and working "very hard" on stopping.   No antibiotic intolerance thus far, levaquin 500 mg. (on IV from 8/30-10/8). No breach of joint was suspected and she has not behaved like an infected joint.  We discussed that she will be treated similarly as if the joint had been contaminated.  Wall    12/12/19: tolerating levaquin. Has been on antibiotics 8/30. Taking levaquin daily and no new joint/tendon pain. Rare diarrhea and no yeast infections. Doing well from a joint standpoint and ROM is improved. Still has a wound vac and tendon is almost closed. [facility]. Fell back sprague into her bathtub yesterday and fire department had to get her out. " Bruise right lower leg and ?left buttock. No head trauma.  Knee wound is slowly better an dthe tendon is almost covered.   20: wound is slowly improving, see photo below. She has intermittent diarrhea, for the last 2-3 weeks and took 4 imodium in the last 4 days. No cramps. Her wellbutrin was recently increased, but stool frequency picked up before this. She has been eating lots of dairy. Increased stress She has HH physical therapy. Lost 5#. Uses walker only for long distances.  Still smoking a few cigarettes per day. No tendonitis and no candidiasis.     3/2020: no side effects from the levaquin. Has been on antibiotics since . Her wound is healed.       Review of patient's allergies indicates:  No Known Allergies  Past Medical History:   Diagnosis Date    Arthritis     Asthma     Encounter for blood transfusion     Hypertension     Wound infection 2019    Right knee   osteoarthritis  Wound infection right knee 2019  Smoker    Past Surgical History:   Procedure Laterality Date     SECTION      JOINT REPLACEMENT      Knee    KNEE ARTHROPLASTY Right 2019    Procedure: ARTHROPLASTY, KNEE;  Surgeon: Delio Chung MD;  Location: [facility] OR;  Service: Orthopedics;  Laterality: Right;  anesthesia:  general and block    REPLACEMENT OF WOUND VACUUM-ASSISTED CLOSURE DEVICE Right 2019    Procedure: REPLACEMENT, WOUND VAC;  Surgeon: Delio Chung MD;  Location: [facility] OR;  Service: Orthopedics;  Laterality: Right;    TONSILLECTOMY       Social History     Tobacco Use    Smoking status: Light Tobacco Smoker     Packs/day: 0.50     Years: 30.00     Pack years: 15.00     Types: Cigarettes    Smokeless tobacco: Never Used   Substance Use Topics    Alcohol use: Yes     Comment: RARELY     Social History     Occupational History    Not on file     Family History   Problem Relation Age of Onset    Alzheimer's disease Mother          Review of Systems    Constitutional: No fever,  "chills, sweats, fatigue, weakness,     Eyes: No change in vision,      ENT: No sore throat, mouth pains, or lesions    Cardiovascular: No chest pain, VALERIO, or worsening pedal edema    Respiratory: No shortness of breath, VALERIO, and she is working hard on stopping smoking, still on 2-3 per day  Gastrointestinal: No abdominal pain, nausea, vomiting,      Genitourinary: No dysuria, hematuria, and no vaginitis    Musculoskeletal: No new pain, joint swelling,   She does her own physical therapy    Integumentary: No new rashes, skin lesions,      Neurological: No dizziness, vertigo, unusual headaches, neuropathy, loss of vision, falls    Psychiatric: No anxiety, depression    Endocrine:      Lymphatic: No lymphadenopathy, blood loss, , malignancy    VAD:     Objective:      Blood pressure 112/81, pulse 78, temperature 97.7 °F (36.5 °C), temperature source Temporal, height 5' 3" (1.6 m), weight 120.7 kg (266 lb), SpO2 98 %. Body mass index is 47.12 kg/m².  Physical Exam      General: Alert and attentive, cooperative and in no distress    Eyes:  , anicteric, EOMI    Neck: Supple,      ENT: EAC patent,   nares patent, no oral lesions, teeth in good condition, no thrush    Cardiovascular:      Respiratory:      Gastrointestinal:       Genitourinary:      Integumentary: Skin without rashes      Vascular:  Mild but stable peripheral edema  , warm and well perfused    Musculoskeletal:  Using no cane or walker,  no acute arthritis, synovitis or myositis. ROM is improved    Lymphatic:      Neurological: Normal LOC, cranial nerves, speech , gait    Psychiatric: Normal mood, speech,  demeanor     Wound:            photo from 10/25      12/11      1/23/20    3/5 : the skin is healed. There is scab that has not completely fallen off.     VAD:      Recent Diagnostics:          Assessment and Plan:           Infection of right knee    Proteus infection    Encounter for long-term (current) use of antibiotics        Finish this bottle of " levaquin and then you can stop    Please call me if you need me or if you have concern that you are relapsing    This note was created using Dragon voice recognition software that occasionally misinterpreted phrases or words.

## 2020-03-05 NOTE — PATIENT INSTRUCTIONS
Finish this bottle of levaquin and then you can stop    Please call me if you need me or if you have concern that you are relapsing.

## 2020-03-16 DIAGNOSIS — M79.7 FIBROMYALGIA: ICD-10-CM

## 2020-03-16 DIAGNOSIS — M25.561 RIGHT KNEE PAIN, UNSPECIFIED CHRONICITY: ICD-10-CM

## 2020-03-16 RX ORDER — HYDROCODONE BITARTRATE AND ACETAMINOPHEN 10; 325 MG/1; MG/1
1 TABLET ORAL EVERY 8 HOURS PRN
Qty: 90 TABLET | Refills: 0 | Status: SHIPPED | OUTPATIENT
Start: 2020-03-16 | End: 2020-04-21 | Stop reason: SDUPTHER

## 2020-03-16 NOTE — TELEPHONE ENCOUNTER
----- Message from Roselyn Abarca sent at 3/16/2020  2:56 PM CDT -----  Refill for Hydrocodone. [facility] on [address] . Pt #[phone]

## 2020-04-03 ENCOUNTER — EXTERNAL HOME HEALTH (OUTPATIENT)
Dept: HOME HEALTH SERVICES | Facility: HOSPITAL | Age: 65
End: 2020-04-03

## 2020-04-14 ENCOUNTER — TELEPHONE (OUTPATIENT)
Dept: FAMILY MEDICINE | Facility: CLINIC | Age: 65
End: 2020-04-14

## 2020-04-14 DIAGNOSIS — E66.01 MORBID OBESITY: Primary | ICD-10-CM

## 2020-04-14 DIAGNOSIS — M17.0 PRIMARY OSTEOARTHRITIS OF BOTH KNEES: ICD-10-CM

## 2020-04-14 NOTE — TELEPHONE ENCOUNTER
----- Message from Maricel Boone sent at 4/14/2020 10:19 AM CDT -----  Contact: Lashonda gamez/ [facility]   Pt says she had surgery in august and September. She had a Rolator ordered but she couldn't fit in it because of her hips being too wide. It was returned because she was supposed to get a larger one. Now she needs a new prescription for a Larger  Rolator sent PHN please. Fax# [phone]  Lashonda's# [phone] ext: [phone]

## 2020-04-21 ENCOUNTER — OFFICE VISIT (OUTPATIENT)
Dept: FAMILY MEDICINE | Facility: CLINIC | Age: 65
End: 2020-04-21
Payer: MEDICARE

## 2020-04-21 ENCOUNTER — TELEPHONE (OUTPATIENT)
Dept: FAMILY MEDICINE | Facility: CLINIC | Age: 65
End: 2020-04-21

## 2020-04-21 VITALS — SYSTOLIC BLOOD PRESSURE: 121 MMHG | DIASTOLIC BLOOD PRESSURE: 78 MMHG

## 2020-04-21 DIAGNOSIS — M17.11 PRIMARY OSTEOARTHRITIS OF RIGHT KNEE: ICD-10-CM

## 2020-04-21 DIAGNOSIS — E78.5 DYSLIPIDEMIA: ICD-10-CM

## 2020-04-21 DIAGNOSIS — Z12.9 SCREENING FOR CANCER: ICD-10-CM

## 2020-04-21 DIAGNOSIS — M25.561 RIGHT KNEE PAIN, UNSPECIFIED CHRONICITY: ICD-10-CM

## 2020-04-21 DIAGNOSIS — F33.0 MILD EPISODE OF RECURRENT MAJOR DEPRESSIVE DISORDER: ICD-10-CM

## 2020-04-21 DIAGNOSIS — I10 ESSENTIAL HYPERTENSION: ICD-10-CM

## 2020-04-21 DIAGNOSIS — M79.7 FIBROMYALGIA: ICD-10-CM

## 2020-04-21 DIAGNOSIS — Z12.11 COLON CANCER SCREENING: ICD-10-CM

## 2020-04-21 DIAGNOSIS — F17.210 CIGARETTE SMOKER: ICD-10-CM

## 2020-04-21 DIAGNOSIS — J44.9 CHRONIC OBSTRUCTIVE PULMONARY DISEASE, UNSPECIFIED COPD TYPE: Primary | ICD-10-CM

## 2020-04-21 PROCEDURE — 3078F PR MOST RECENT DIASTOLIC BLOOD PRESSURE < 80 MM HG: ICD-10-PCS | Mod: 95,,, | Performed by: NURSE PRACTITIONER

## 2020-04-21 PROCEDURE — 1101F PT FALLS ASSESS-DOCD LE1/YR: CPT | Mod: 95,,, | Performed by: NURSE PRACTITIONER

## 2020-04-21 PROCEDURE — 3078F DIAST BP <80 MM HG: CPT | Mod: 95,,, | Performed by: NURSE PRACTITIONER

## 2020-04-21 PROCEDURE — 3074F SYST BP LT 130 MM HG: CPT | Mod: 95,,, | Performed by: NURSE PRACTITIONER

## 2020-04-21 PROCEDURE — 1159F MED LIST DOCD IN RCRD: CPT | Mod: 95,,, | Performed by: NURSE PRACTITIONER

## 2020-04-21 PROCEDURE — 99213 PR OFFICE/OUTPT VISIT, EST, LEVL III, 20-29 MIN: ICD-10-PCS | Mod: 95,,, | Performed by: NURSE PRACTITIONER

## 2020-04-21 PROCEDURE — 1101F PR PT FALLS ASSESS DOC 0-1 FALLS W/OUT INJ PAST YR: ICD-10-PCS | Mod: 95,,, | Performed by: NURSE PRACTITIONER

## 2020-04-21 PROCEDURE — 3074F PR MOST RECENT SYSTOLIC BLOOD PRESSURE < 130 MM HG: ICD-10-PCS | Mod: 95,,, | Performed by: NURSE PRACTITIONER

## 2020-04-21 PROCEDURE — 1159F PR MEDICATION LIST DOCUMENTED IN MEDICAL RECORD: ICD-10-PCS | Mod: 95,,, | Performed by: NURSE PRACTITIONER

## 2020-04-21 PROCEDURE — 99213 OFFICE O/P EST LOW 20 MIN: CPT | Mod: 95,,, | Performed by: NURSE PRACTITIONER

## 2020-04-21 RX ORDER — BUPROPION HYDROCHLORIDE 150 MG/1
150 TABLET, EXTENDED RELEASE ORAL 2 TIMES DAILY
Qty: 180 TABLET | Refills: 1 | Status: SHIPPED | OUTPATIENT
Start: 2020-04-21 | End: 2020-10-21 | Stop reason: SDUPTHER

## 2020-04-21 RX ORDER — HYDROCODONE BITARTRATE AND ACETAMINOPHEN 10; 325 MG/1; MG/1
1 TABLET ORAL EVERY 8 HOURS PRN
Qty: 90 TABLET | Refills: 0 | Status: SHIPPED | OUTPATIENT
Start: 2020-04-21 | End: 2020-07-20 | Stop reason: SDUPTHER

## 2020-04-21 RX ORDER — HYDROCODONE BITARTRATE AND ACETAMINOPHEN 10; 325 MG/1; MG/1
1 TABLET ORAL EVERY 8 HOURS PRN
Qty: 90 TABLET | Refills: 0 | Status: SHIPPED | OUTPATIENT
Start: 2020-05-21 | End: 2020-07-21 | Stop reason: SDUPTHER

## 2020-04-21 RX ORDER — HYDROCODONE BITARTRATE AND ACETAMINOPHEN 10; 325 MG/1; MG/1
1 TABLET ORAL EVERY 8 HOURS PRN
Qty: 90 TABLET | Refills: 0 | Status: SHIPPED | OUTPATIENT
Start: 2020-06-20 | End: 2020-07-21 | Stop reason: SDUPTHER

## 2020-04-21 NOTE — TELEPHONE ENCOUNTER
----- Message from Roselyn Cote NP sent at 4/21/2020  3:42 PM CDT -----  Please set up 3 mos of pain med refill for Dr. You to send in . Last fill date 3/18/2020

## 2020-04-21 NOTE — PROGRESS NOTES
"    Subjective:        The chief complaint leading to consultation is: wound/HTN f/u  The patient location is:  Home  Visit type: Virtual visit with synchronous audio/video or audio only  This was a video visit in lieu of in-person visit due to the coronavirus emergency. Patient acknowledged and consented to the video visit encounter.     Pt reports overall doing okay. Has been staying home d/t COVID19 quarantine.  Still has HH coming out once a week to check on right knee wound- reports still has one small area that doesn't want to close up. No drainage, erythema or swelling. Reports tries to do her knee exercises regularly though still hurts her quite a bit- c/o's of leg/muscles "jumping" at night. Has been using walker but waiting on larger rollator so she can to do more walking outside.  Still smoking 3-5 cigs/day. Reports chronic cough stable, +SOB with walking more than 75 feet  Pt reports BP was been well controlled when  checks it- no longer taking lisinopril since hospial stay last year. Hasn't been using furosemide either recenlty      Past Surgical History:   Procedure Laterality Date     SECTION      JOINT REPLACEMENT      Knee    KNEE ARTHROPLASTY Right 2019    Procedure: ARTHROPLASTY, KNEE;  Surgeon: Delio Chung MD;  Location: [facility] OR;  Service: Orthopedics;  Laterality: Right;  anesthesia:  general and block    REPLACEMENT OF WOUND VACUUM-ASSISTED CLOSURE DEVICE Right 2019    Procedure: REPLACEMENT, WOUND VAC;  Surgeon: Delio Chung MD;  Location: [facility] OR;  Service: Orthopedics;  Laterality: Right;    TONSILLECTOMY       Past Medical History:   Diagnosis Date    Arthritis     Asthma     Encounter for blood transfusion     Hypertension     Wound infection 2019    Right knee     Family History   Problem Relation Age of Onset    Alzheimer's disease Mother         Social History:   Marital Status:   Alcohol History:  reports that she drinks " alcohol.  Tobacco History:  reports that she has been smoking cigarettes. She has a 15.00 pack-year smoking history. She has never used smokeless tobacco.  Drug History:  reports that she does not use drugs.    Review of patient's allergies indicates:  No Known Allergies    Current Outpatient Medications   Medication Sig Dispense Refill    ANORO ELLIPTA 62.5-25 mcg/actuation DsDv Inhale 1 puff into the lungs daily as needed (SOB). 1 each 5    atorvastatin (LIPITOR) 10 MG tablet Take 1 tablet (10 mg total) by mouth every evening. 90 tablet 1    buPROPion (WELLBUTRIN SR) 150 MG TBSR 12 hr tablet Take 1 tablet (150 mg total) by mouth 2 (two) times daily. 180 tablet 1    ferrous sulfate (FEOSOL) 325 mg (65 mg iron) Tab tablet Take 1 tablet (325 mg total) by mouth once daily.  0    FLUoxetine 40 MG capsule Take 1 capsule (40 mg total) by mouth once daily. 90 capsule 1    gabapentin (NEURONTIN) 300 MG capsule Take 1 capsule (300 mg total) by mouth 3 (three) times daily. 90 capsule 5    HYDROcodone-acetaminophen (NORCO)  mg per tablet Take 1 tablet by mouth every 8 (eight) hours as needed for Pain. 90 tablet 0    traZODone (DESYREL) 50 MG tablet Take 1 tablet (50 mg total) by mouth every evening. (Patient taking differently: Take 50 mg by mouth continuous prn. ) 30 tablet 0    furosemide (LASIX) 20 MG tablet Take 1 tablet (20 mg total) by mouth once daily. (Patient not taking: Reported on 4/21/2020) 30 tablet 0     No current facility-administered medications for this visit.      Facility-Administered Medications Ordered in Other Visits   Medication Dose Route Frequency Provider Last Rate Last Dose    lidocaine (PF) 10 mg/ml (1%) injection 5 mg  0.5 mL Intradermal Once Azeem Anderson MD           Review of Systems   Constitutional: Negative for chills and fever. Unexpected weight change:  weight down 13 lb since October visit.   Respiratory: Positive for shortness of breath ( mild with walking longer  distance) and wheezing ( rarely, hasn't used rescue inhaler recently). Negative for cough.    Cardiovascular: Negative for chest pain, palpitations and leg swelling.   Gastrointestinal: Negative for abdominal pain, constipation and diarrhea.   Genitourinary: Negative for dysuria and hematuria.   Musculoskeletal: Positive for arthralgias ( right knee pain) and gait problem (mobility limited d/t knee pain).   Skin: Negative for rash.   Neurological: Negative for dizziness and headaches.   Psychiatric/Behavioral: Negative for dysphoric mood (stable on meds).         Objective:      /78   Physical Exam:   Physical Exam   Constitutional: She is oriented to person, place, and time. She appears well-developed and well-nourished.   Neurological: She is alert and oriented to person, place, and time.   Psychiatric: She has a normal mood and affect.            Assessment:       1. Chronic obstructive pulmonary disease, unspecified COPD type    2. Essential hypertension    3. Dyslipidemia    4. Primary osteoarthritis of right knee    5. Cigarette smoker    6. Screening for cancer    7. Colon cancer screening    8. Mild episode of recurrent major depressive disorder      Plan:   Chronic obstructive pulmonary disease, unspecified COPD type  -stable, though still smoking a few cigarettes daily    Essential hypertension  -     Comprehensive metabolic panel; Future; Expected date: 04/21/2020  -     CBC auto differential; Future; Expected date: 04/21/2020  -     TSH; Future; Expected date: 04/21/2020  -BP well controlled on  readings    Dyslipidemia  -     Lipid panel; Future; Expected date: 04/21/2020  -lab orders sent to [facility] for  to draw on next visit    Primary osteoarthritis of right knee  -     C-reactive protein; Future; Expected date: 04/21/2020  -continues with slow recovery from TKR 8 months ago. Reports still has small wound to right knee which has scabbed over but not closed completely, no signs of  infection    Cigarette smoker  -     buPROPion (WELLBUTRIN SR) 150 MG TBSR 12 hr tablet; Take 1 tablet (150 mg total) by mouth 2 (two) times daily.  Dispense: 180 tablet; Refill: 1  -     CT Chest Lung Screening Low Dose; Future; Expected date: 07/21/2020  -pt advsied she's due for annual LDCT- she would like to put it off a few more months d/t COVID19 concerns    Screening for cancer  -     CT Chest Lung Screening Low Dose; Future; Expected date: 07/21/2020    Colon cancer screening  -     Occult Blood Stool, CA Screen; Future; Expected date: 04/21/2020    Mild depression  -stable on current meds, buproprion added mainly to help with quitting smoking though she reports tolerating well, continue current meds    Follow up in about 3 months (around 7/21/2020) for HTN/OA.    Total time spent with patient: 20    Each patient to whom he or she provides medical services by telemedicine is:  (1) informed of the relationship between the physician and patient and the respective role of any other health care provider with respect to management of the patient; and (2) notified that he or she may decline to receive medical services by telemedicine and may withdraw from such care at any time.    This note was created using [employer] voice recognition software that occasionally misinterprets phrases or words.

## 2020-04-23 ENCOUNTER — TELEPHONE (OUTPATIENT)
Dept: FAMILY MEDICINE | Facility: CLINIC | Age: 65
End: 2020-04-23

## 2020-04-23 NOTE — TELEPHONE ENCOUNTER
----- Message from Tana Brown MA sent at 4/23/2020  2:01 PM CDT -----      ----- Message -----  From: Francheska Carnes  Sent: 4/23/2020   1:22 PM CDT  To: Elkin You Staff    LAB, [facility], 4/22/20

## 2020-04-23 NOTE — TELEPHONE ENCOUNTER
Please call pt and let her know labs HH deonna look good. Cholesterol levels are well controlled. Blood sugar, liver, thyroid and blood count within normal range. Very mild weakening in kidney function is stable.

## 2020-04-27 ENCOUNTER — TELEPHONE (OUTPATIENT)
Dept: FAMILY MEDICINE | Facility: CLINIC | Age: 65
End: 2020-04-27

## 2020-04-27 ENCOUNTER — DOCUMENT SCAN (OUTPATIENT)
Dept: HOME HEALTH SERVICES | Facility: HOSPITAL | Age: 65
End: 2020-04-27

## 2020-04-27 DIAGNOSIS — J44.9 CHRONIC OBSTRUCTIVE PULMONARY DISEASE, UNSPECIFIED COPD TYPE: Primary | ICD-10-CM

## 2020-04-27 DIAGNOSIS — M17.0 PRIMARY OSTEOARTHRITIS OF BOTH KNEES: ICD-10-CM

## 2020-04-27 DIAGNOSIS — M17.11 PRIMARY OSTEOARTHRITIS OF RIGHT KNEE: ICD-10-CM

## 2020-04-28 ENCOUNTER — DOCUMENT SCAN (OUTPATIENT)
Dept: HOME HEALTH SERVICES | Facility: HOSPITAL | Age: 65
End: 2020-04-28

## 2020-04-28 NOTE — TELEPHONE ENCOUNTER
----- Message from Carolyne Khoury sent at 4/27/2020  9:01 AM CDT -----  Pt has to weigh more than 300lbs to qualify for a heavy duty one  ----- Message -----  From: Roselyn Cote NP  Sent: 4/26/2020   8:18 PM CDT  To: Carolyne Khoury    Can you have Vinay look into why they denied heavy duty rollator for pt?  Roselyn Blue    ----- Message -----  From: Tana Brown MA  Sent: 4/24/2020  11:44 AM CDT  To: Roselyn Cote NP        ----- Message -----  From: Francheska Carnes  Sent: 4/24/2020  11:00 AM CDT  To: Elkin You [facility], 4/23/20

## 2020-04-29 ENCOUNTER — TELEPHONE (OUTPATIENT)
Dept: FAMILY MEDICINE | Facility: CLINIC | Age: 65
End: 2020-04-29

## 2020-04-29 DIAGNOSIS — M25.561 RIGHT KNEE PAIN, UNSPECIFIED CHRONICITY: ICD-10-CM

## 2020-04-29 RX ORDER — GABAPENTIN 300 MG/1
300 CAPSULE ORAL 3 TIMES DAILY
Qty: 90 CAPSULE | Refills: 5 | Status: SHIPPED | OUTPATIENT
Start: 2020-04-29 | End: 2020-10-21 | Stop reason: SDUPTHER

## 2020-04-29 NOTE — TELEPHONE ENCOUNTER
----- Message from Yadira Jimenez MA sent at 4/29/2020  3:40 PM CDT -----  Pt called in to advise that she got a call that they were bringing her Rolator today, but they are bringing the smaller one.  Pt states that is because her weight is recorded incorrectly.  When she visit Dr. Suarez her weight was 273.  Please contact pt to discuss further.    Pt - [phone]

## 2020-04-29 NOTE — TELEPHONE ENCOUNTER
----- Message from Laurie Camacho sent at 4/29/2020  2:38 PM CDT -----  Refills gabapentin   Pharm [facility]   Pt [phone]

## 2020-04-29 NOTE — TELEPHONE ENCOUNTER
Spoke with Malena she will order the regular rollator for the pt. She does not qualify for the heavy duty one.

## 2020-04-29 NOTE — TELEPHONE ENCOUNTER
----- Message from Maricel Boone sent at 4/29/2020 11:57 AM CDT -----  Contact: Malena DODGE   Needs to get clarification on the regular rollator. She says she would like for the nurse to give her a call back please. As soon as possible.   # [phone]

## 2020-04-29 NOTE — TELEPHONE ENCOUNTER
According to [facility] she can not get the heavy duty one bc she is not 300lbs. I told her this earlier. Please call her.

## 2020-05-12 ENCOUNTER — TELEPHONE (OUTPATIENT)
Dept: FAMILY MEDICINE | Facility: CLINIC | Age: 65
End: 2020-05-12

## 2020-05-12 NOTE — TELEPHONE ENCOUNTER
I did advise Arline of the denial.  I told her that we had went over and over this when it was ordered.  The patient did not weigh enough for the heave duty rolator.  She advised that she was going to contact the department that handles medical equipment and advise that the patient will need the heavy duty rolator and requested we re-submit the order.

## 2020-05-12 NOTE — TELEPHONE ENCOUNTER
[facility] has already declined her for the heavy duty rollator bc she does not meet the requirements. Please let Arline know.

## 2020-05-12 NOTE — TELEPHONE ENCOUNTER
----- Message from Yadira Jimenez MA sent at 5/12/2020 12:53 PM CDT -----  Arline,  with [facility], called in to advise that the pt received her Rolator, but is reporting that is is too small and a heavy duty Rolator needs to be ordered. Arline advised that she is going to place a call to a dept at [facility] to advise that the pt will need a heavy duty Rolator, so she is requesting we re-submit the order for the heavy duty Rolator.

## 2020-05-18 ENCOUNTER — TELEPHONE (OUTPATIENT)
Dept: FAMILY MEDICINE | Facility: CLINIC | Age: 65
End: 2020-05-18

## 2020-05-18 NOTE — TELEPHONE ENCOUNTER
----- Message from Laurie Camacho sent at 5/18/2020 12:21 PM CDT -----  Pain med   Pharm [facility]   Pt [phone]

## 2020-05-21 ENCOUNTER — TELEPHONE (OUTPATIENT)
Dept: FAMILY MEDICINE | Facility: CLINIC | Age: 65
End: 2020-05-21

## 2020-05-21 NOTE — TELEPHONE ENCOUNTER
Spoke to patient. States that [facility] hasn't called to schedule, gave her number to call them.

## 2020-05-21 NOTE — TELEPHONE ENCOUNTER
----- Message from [facility], RT sent at 2/12/2020 12:22 PM CST -----  Regarding: LDCT ordered at visit?  From [facility] action on 2/21/2019:  Pr needs repeat LDCTS in 2/2020.     Spoke to patient that LDCTS is due, states that she has a lot going on right now, has wound vac on her knee. Said she will address this at ov with Roselyn in April. Made note in appt that this is due and created remind me to be sure it was ordered at her visit.

## 2020-06-10 ENCOUNTER — TELEPHONE (OUTPATIENT)
Dept: FAMILY MEDICINE | Facility: CLINIC | Age: 65
End: 2020-06-10

## 2020-06-10 NOTE — TELEPHONE ENCOUNTER
----- Message from [facility], RT sent at 2/12/2020 12:22 PM CST -----  Regarding: LDCT ordered at visit?  From [facility] action on 2/21/2019:  Pr needs repeat LDCTS in 2/2020.     Spoke to patient that LDCTS is due, states that she has a lot going on right now, has wound vac on her knee. Said she will address this at ov with Roselyn in April. Made note in appt that this is due and created remind me to be sure it was ordered at her visit.

## 2020-06-18 DIAGNOSIS — E78.5 DYSLIPIDEMIA: ICD-10-CM

## 2020-06-18 RX ORDER — ATORVASTATIN CALCIUM 10 MG/1
10 TABLET, FILM COATED ORAL NIGHTLY
Qty: 90 TABLET | Refills: 1 | Status: SHIPPED | OUTPATIENT
Start: 2020-06-18 | End: 2020-10-21 | Stop reason: SDUPTHER

## 2020-06-18 NOTE — TELEPHONE ENCOUNTER
----- Message from Yadira Jimenez MA sent at 6/18/2020 11:00 AM CDT -----  Pt is requesting a refill:    Atorvastatin 10 mg  Hydrocodone  mg    [facility]    Pt - [phone]

## 2020-06-24 ENCOUNTER — TELEPHONE (OUTPATIENT)
Dept: FAMILY MEDICINE | Facility: CLINIC | Age: 65
End: 2020-06-24

## 2020-06-24 NOTE — TELEPHONE ENCOUNTER
Spoke to patient that LDCT is due. Said she forgot. I informed her that we make 3 attempts to remind patient to get scheduled and we will not call anymore. Patient verbalized understandingThis is 3rd attempt. Can I renate reminder complete?

## 2020-06-24 NOTE — TELEPHONE ENCOUNTER
----- Message from [facility], RT sent at 2/12/2020 12:22 PM CST -----  Regarding: LDCT ordered at visit?  From [facility] action on 2/21/2019:  Pr needs repeat LDCTS in 2/2020.     Spoke to patient that LDCTS is due, states that she has a lot going on right now, has wound vac on her knee. Said she will address this at ov with Roselyn in April. Made note in appt that this is due and created remind me to be sure it was ordered at her visit.

## 2020-07-07 ENCOUNTER — TELEPHONE (OUTPATIENT)
Dept: FAMILY MEDICINE | Facility: CLINIC | Age: 65
End: 2020-07-07

## 2020-07-07 NOTE — TELEPHONE ENCOUNTER
Spoke to pt to let her know she is due to have fasting labs before her next office visit 07/21/2020 at 9:20 AM. Pt verbalized understanding

## 2020-07-15 ENCOUNTER — TELEPHONE (OUTPATIENT)
Dept: FAMILY MEDICINE | Facility: CLINIC | Age: 65
End: 2020-07-15

## 2020-07-15 NOTE — TELEPHONE ENCOUNTER
Spoke to pt regarding her appt on 7/21. Offered virtual visit pt refused she wants to come into the office for her visit. Pt advised to wear mask//call upon arrival

## 2020-07-16 LAB
ALBUMIN SERPL-MCNC: 4.2 G/DL (ref 3.6–5.1)
ALBUMIN/GLOB SERPL: 1.6 (CALC) (ref 1–2.5)
ALP SERPL-CCNC: 106 U/L (ref 37–153)
ALT SERPL-CCNC: 8 U/L (ref 6–29)
AST SERPL-CCNC: 10 U/L (ref 10–35)
BASOPHILS # BLD AUTO: 32 CELLS/UL (ref 0–200)
BASOPHILS NFR BLD AUTO: 0.6 %
BILIRUB SERPL-MCNC: 0.5 MG/DL (ref 0.2–1.2)
BUN SERPL-MCNC: 18 MG/DL (ref 7–25)
BUN/CREAT SERPL: 15 (CALC) (ref 6–22)
CALCIUM SERPL-MCNC: 9.1 MG/DL (ref 8.6–10.4)
CHLORIDE SERPL-SCNC: 101 MMOL/L (ref 98–110)
CHOLEST SERPL-MCNC: 185 MG/DL
CHOLEST/HDLC SERPL: 2.4 (CALC)
CO2 SERPL-SCNC: 30 MMOL/L (ref 20–32)
CREAT SERPL-MCNC: 1.23 MG/DL (ref 0.5–0.99)
CRP SERPL-MCNC: 3.3 MG/L
EOSINOPHIL # BLD AUTO: 248 CELLS/UL (ref 15–500)
EOSINOPHIL NFR BLD AUTO: 4.6 %
ERYTHROCYTE [DISTWIDTH] IN BLOOD BY AUTOMATED COUNT: 14.9 % (ref 11–15)
GFRSERPLBLD MDRD-ARVRAT: 46 ML/MIN/1.73M2
GLOBULIN SER CALC-MCNC: 2.6 G/DL (CALC) (ref 1.9–3.7)
GLUCOSE SERPL-MCNC: 79 MG/DL (ref 65–99)
HCT VFR BLD AUTO: 40.4 % (ref 35–45)
HDLC SERPL-MCNC: 77 MG/DL
HGB BLD-MCNC: 12.9 G/DL (ref 11.7–15.5)
LDLC SERPL CALC-MCNC: 88 MG/DL (CALC)
LYMPHOCYTES # BLD AUTO: 1042 CELLS/UL (ref 850–3900)
LYMPHOCYTES NFR BLD AUTO: 19.3 %
MCH RBC QN AUTO: 30.1 PG (ref 27–33)
MCHC RBC AUTO-ENTMCNC: 31.9 G/DL (ref 32–36)
MCV RBC AUTO: 94.4 FL (ref 80–100)
MONOCYTES # BLD AUTO: 340 CELLS/UL (ref 200–950)
MONOCYTES NFR BLD AUTO: 6.3 %
NEUTROPHILS # BLD AUTO: 3737 CELLS/UL (ref 1500–7800)
NEUTROPHILS NFR BLD AUTO: 69.2 %
NONHDLC SERPL-MCNC: 108 MG/DL (CALC)
PLATELET # BLD AUTO: 304 THOUSAND/UL (ref 140–400)
PMV BLD REES-ECKER: 9.6 FL (ref 7.5–12.5)
POTASSIUM SERPL-SCNC: 4.2 MMOL/L (ref 3.5–5.3)
PROT SERPL-MCNC: 6.8 G/DL (ref 6.1–8.1)
RBC # BLD AUTO: 4.28 MILLION/UL (ref 3.8–5.1)
SODIUM SERPL-SCNC: 137 MMOL/L (ref 135–146)
TRIGL SERPL-MCNC: 102 MG/DL
TSH SERPL-ACNC: 2.88 MIU/L (ref 0.4–4.5)
WBC # BLD AUTO: 5.4 THOUSAND/UL (ref 3.8–10.8)

## 2020-07-20 DIAGNOSIS — M25.561 RIGHT KNEE PAIN, UNSPECIFIED CHRONICITY: ICD-10-CM

## 2020-07-20 DIAGNOSIS — M79.7 FIBROMYALGIA: ICD-10-CM

## 2020-07-20 RX ORDER — HYDROCODONE BITARTRATE AND ACETAMINOPHEN 10; 325 MG/1; MG/1
1 TABLET ORAL EVERY 8 HOURS PRN
Qty: 90 TABLET | Refills: 0 | Status: SHIPPED | OUTPATIENT
Start: 2020-07-20 | End: 2020-07-21 | Stop reason: SDUPTHER

## 2020-07-20 NOTE — TELEPHONE ENCOUNTER
----- Message from Laurie Camacho sent at 7/20/2020  2:29 PM CDT -----  Regarding: refills  Hydrocodone   Pharm [facility]   Pt [phone]

## 2020-07-21 ENCOUNTER — OFFICE VISIT (OUTPATIENT)
Dept: FAMILY MEDICINE | Facility: CLINIC | Age: 65
End: 2020-07-21
Payer: MEDICARE

## 2020-07-21 VITALS
SYSTOLIC BLOOD PRESSURE: 102 MMHG | BODY MASS INDEX: 49.61 KG/M2 | WEIGHT: 280 LBS | TEMPERATURE: 98 F | HEART RATE: 72 BPM | HEIGHT: 63 IN | DIASTOLIC BLOOD PRESSURE: 78 MMHG

## 2020-07-21 DIAGNOSIS — M79.7 FIBROMYALGIA: ICD-10-CM

## 2020-07-21 DIAGNOSIS — M17.11 PRIMARY OSTEOARTHRITIS OF RIGHT KNEE: ICD-10-CM

## 2020-07-21 DIAGNOSIS — Z23 NEED FOR 23-POLYVALENT PNEUMOCOCCAL POLYSACCHARIDE VACCINE: ICD-10-CM

## 2020-07-21 DIAGNOSIS — E66.01 MORBID OBESITY: ICD-10-CM

## 2020-07-21 DIAGNOSIS — J44.9 CHRONIC OBSTRUCTIVE PULMONARY DISEASE, UNSPECIFIED COPD TYPE: Primary | ICD-10-CM

## 2020-07-21 DIAGNOSIS — F17.210 CIGARETTE SMOKER: ICD-10-CM

## 2020-07-21 DIAGNOSIS — L30.9 DERMATITIS: ICD-10-CM

## 2020-07-21 DIAGNOSIS — M25.561 RIGHT KNEE PAIN, UNSPECIFIED CHRONICITY: ICD-10-CM

## 2020-07-21 DIAGNOSIS — F33.0 MILD EPISODE OF RECURRENT MAJOR DEPRESSIVE DISORDER: ICD-10-CM

## 2020-07-21 DIAGNOSIS — E78.5 DYSLIPIDEMIA: ICD-10-CM

## 2020-07-21 PROCEDURE — 90732 PPSV23 VACC 2 YRS+ SUBQ/IM: CPT | Mod: S$GLB,,, | Performed by: NURSE PRACTITIONER

## 2020-07-21 PROCEDURE — 1159F MED LIST DOCD IN RCRD: CPT | Mod: S$GLB,,, | Performed by: NURSE PRACTITIONER

## 2020-07-21 PROCEDURE — 99214 OFFICE O/P EST MOD 30 MIN: CPT | Mod: 25,S$GLB,, | Performed by: NURSE PRACTITIONER

## 2020-07-21 PROCEDURE — 1101F PR PT FALLS ASSESS DOC 0-1 FALLS W/OUT INJ PAST YR: ICD-10-PCS | Mod: S$GLB,,, | Performed by: NURSE PRACTITIONER

## 2020-07-21 PROCEDURE — 3074F SYST BP LT 130 MM HG: CPT | Mod: S$GLB,,, | Performed by: NURSE PRACTITIONER

## 2020-07-21 PROCEDURE — G0009 PNEUMOCOCCAL POLYSACCHARIDE VACCINE 23-VALENT =>2YO SQ IM: ICD-10-PCS | Mod: S$GLB,,, | Performed by: NURSE PRACTITIONER

## 2020-07-21 PROCEDURE — 1101F PT FALLS ASSESS-DOCD LE1/YR: CPT | Mod: S$GLB,,, | Performed by: NURSE PRACTITIONER

## 2020-07-21 PROCEDURE — 90732 PNEUMOCOCCAL POLYSACCHARIDE VACCINE 23-VALENT =>2YO SQ IM: ICD-10-PCS | Mod: S$GLB,,, | Performed by: NURSE PRACTITIONER

## 2020-07-21 PROCEDURE — G0009 ADMIN PNEUMOCOCCAL VACCINE: HCPCS | Mod: S$GLB,,, | Performed by: NURSE PRACTITIONER

## 2020-07-21 PROCEDURE — 99214 PR OFFICE/OUTPT VISIT, EST, LEVL IV, 30-39 MIN: ICD-10-PCS | Mod: 25,S$GLB,, | Performed by: NURSE PRACTITIONER

## 2020-07-21 PROCEDURE — 3078F DIAST BP <80 MM HG: CPT | Mod: S$GLB,,, | Performed by: NURSE PRACTITIONER

## 2020-07-21 PROCEDURE — 3008F PR BODY MASS INDEX (BMI) DOCUMENTED: ICD-10-PCS | Mod: S$GLB,,, | Performed by: NURSE PRACTITIONER

## 2020-07-21 PROCEDURE — 1159F PR MEDICATION LIST DOCUMENTED IN MEDICAL RECORD: ICD-10-PCS | Mod: S$GLB,,, | Performed by: NURSE PRACTITIONER

## 2020-07-21 PROCEDURE — 3078F PR MOST RECENT DIASTOLIC BLOOD PRESSURE < 80 MM HG: ICD-10-PCS | Mod: S$GLB,,, | Performed by: NURSE PRACTITIONER

## 2020-07-21 PROCEDURE — 3074F PR MOST RECENT SYSTOLIC BLOOD PRESSURE < 130 MM HG: ICD-10-PCS | Mod: S$GLB,,, | Performed by: NURSE PRACTITIONER

## 2020-07-21 PROCEDURE — 3008F BODY MASS INDEX DOCD: CPT | Mod: S$GLB,,, | Performed by: NURSE PRACTITIONER

## 2020-07-21 RX ORDER — HYDROCODONE BITARTRATE AND ACETAMINOPHEN 10; 325 MG/1; MG/1
1 TABLET ORAL EVERY 8 HOURS PRN
Qty: 90 TABLET | Refills: 0 | Status: SHIPPED | OUTPATIENT
Start: 2020-08-19 | End: 2020-09-18

## 2020-07-21 RX ORDER — FLUOXETINE HYDROCHLORIDE 40 MG/1
40 CAPSULE ORAL DAILY
Qty: 90 CAPSULE | Refills: 1 | Status: SHIPPED | OUTPATIENT
Start: 2020-07-21 | End: 2020-10-21 | Stop reason: SDUPTHER

## 2020-07-21 RX ORDER — ALBUTEROL SULFATE 90 UG/1
2 AEROSOL, METERED RESPIRATORY (INHALATION) EVERY 6 HOURS PRN
COMMUNITY
End: 2020-10-21 | Stop reason: SDUPTHER

## 2020-07-21 RX ORDER — HYDROCODONE BITARTRATE AND ACETAMINOPHEN 10; 325 MG/1; MG/1
1 TABLET ORAL EVERY 8 HOURS PRN
Qty: 90 TABLET | Refills: 0 | Status: SHIPPED | OUTPATIENT
Start: 2020-10-18 | End: 2020-10-22 | Stop reason: SDUPTHER

## 2020-07-21 RX ORDER — LORATADINE 10 MG/1
10 TABLET ORAL DAILY
COMMUNITY

## 2020-07-21 RX ORDER — TRIAMCINOLONE ACETONIDE 1 MG/G
CREAM TOPICAL 2 TIMES DAILY
Qty: 45 G | Refills: 2 | Status: SHIPPED | OUTPATIENT
Start: 2020-07-21 | End: 2024-02-03

## 2020-07-21 RX ORDER — HYDROCODONE BITARTRATE AND ACETAMINOPHEN 10; 325 MG/1; MG/1
1 TABLET ORAL EVERY 8 HOURS PRN
Qty: 90 TABLET | Refills: 0 | Status: SHIPPED | OUTPATIENT
Start: 2020-09-18 | End: 2020-10-18

## 2020-07-21 NOTE — PROGRESS NOTES
SUBJECTIVE:    Patient ID: Iris Mueller is a 64 y.o. female.    Chief Complaint: Medication Refill (3 mth, brought bottles// SW) and Injections (wants PNA 23// SW)    64 y /o female here for regular COPD check up. Overall has been feeling good. Breathing okay with daily Anoro, has not needed her rescue inhaler lately. Still smoking about 5 cigs/day. Reports never went and had the LDCT as ordered at last visit because her car wasn't working but now plans on calling to schedule.  Right knee still giving her problems. Pain is located more superficial, knee tender to touch since she had issues with delayed healing/infection and wound. Wound has completely healed now. taking hydrocodone 3x/day for knee pain. Reports Appt with Dr. Chung was canceled back in March d/t COVID-19 and was told she did not need to reschedule appt if she was not having any new problems. Reports she is still doing some home exercises. Goes to her niece's house to practice walking up stairs. Does not want to do anymore PT.   Weight up 14 lbs since last visit. Reports this is d/t to inability to walk long distances because of knee pain as well as COVID isolation.         No visits with results within 6 Month(s) from this visit.   Latest known visit with results is:   Office Visit on 10/23/2019   Component Date Value Ref Range Status    TSH 07/15/2020 2.88  0.40 - 4.50 mIU/L Final    WBC 07/15/2020 5.4  3.8 - 10.8 Thousand/uL Final    RBC 07/15/2020 4.28  3.80 - 5.10 Million/uL Final    Hemoglobin 07/15/2020 12.9  11.7 - 15.5 g/dL Final    Hematocrit 07/15/2020 40.4  35.0 - 45.0 % Final    Mean Corpuscular Volume 07/15/2020 94.4  80.0 - 100.0 fL Final    Mean Corpuscular Hemoglobin 07/15/2020 30.1  27.0 - 33.0 pg Final    Mean Corpuscular Hemoglobin Conc 07/15/2020 31.9* 32.0 - 36.0 g/dL Final    RDW 07/15/2020 14.9  11.0 - 15.0 % Final    Platelets 07/15/2020 304  140 - 400 Thousand/uL Final    MPV 07/15/2020 9.6  7.5 -  12.5 fL Final    Neutrophils Absolute 07/15/2020 3,737  1,500 - 7,800 cells/uL Final    Lymph # 07/15/2020 1,042  850 - 3,900 cells/uL Final    Mono # 07/15/2020 340  200 - 950 cells/uL Final    Eos # 07/15/2020 248  15 - 500 cells/uL Final    Baso # 07/15/2020 32  0 - 200 cells/uL Final    Neutrophils Relative 07/15/2020 69.2  % Final    Lymph% 07/15/2020 19.3  % Final    Mono% 07/15/2020 6.3  % Final    Eosinophil% 07/15/2020 4.6  % Final    Basophil% 07/15/2020 0.6  % Final    CRP 07/15/2020 3.3  <8.0 mg/L Final    Cholesterol 07/15/2020 185  <200 mg/dL Final    HDL 07/15/2020 77  > OR = 50 mg/dL Final    Triglycerides 07/15/2020 102  <150 mg/dL Final    LDL Cholesterol 07/15/2020 88  mg/dL (calc) Final    Hdl/Cholesterol Ratio 07/15/2020 2.4  <5.0 (calc) Final    Non HDL Chol. (LDL+VLDL) 07/15/2020 108  <130 mg/dL (calc) Final    Glucose 07/15/2020 79  65 - 99 mg/dL Final    BUN, Bld 07/15/2020 18  7 - 25 mg/dL Final    Creatinine 07/15/2020 1.23* 0.50 - 0.99 mg/dL Final    eGFR if non African American 07/15/2020 46* > OR = 60 mL/min/1.73m2 Final    eGFR if African American 07/15/2020 53* > OR = 60 mL/min/1.73m2 Final    BUN/Creatinine Ratio 07/15/2020 15  6 - 22 (calc) Final    Sodium 07/15/2020 137  135 - 146 mmol/L Final    Potassium 07/15/2020 4.2  3.5 - 5.3 mmol/L Final    Chloride 07/15/2020 101  98 - 110 mmol/L Final    CO2 07/15/2020 30  20 - 32 mmol/L Final    Calcium 07/15/2020 9.1  8.6 - 10.4 mg/dL Final    Total Protein 07/15/2020 6.8  6.1 - 8.1 g/dL Final    Albumin 07/15/2020 4.2  3.6 - 5.1 g/dL Final    Globulin, Total 07/15/2020 2.6  1.9 - 3.7 g/dL (calc) Final    Albumin/Globulin Ratio 07/15/2020 1.6  1.0 - 2.5 (calc) Final    Total Bilirubin 07/15/2020 0.5  0.2 - 1.2 mg/dL Final    Alkaline Phosphatase 07/15/2020 106  37 - 153 U/L Final    AST 07/15/2020 10  10 - 35 U/L Final    ALT 07/15/2020 8  6 - 29 U/L Final       Past Medical History:   Diagnosis Date     Arthritis     Asthma     Encounter for blood transfusion     Hypertension     Wound infection 2019    Right knee     Past Surgical History:   Procedure Laterality Date     SECTION      JOINT REPLACEMENT      Knee    KNEE ARTHROPLASTY Right 2019    Procedure: ARTHROPLASTY, KNEE;  Surgeon: Delio Chung MD;  Location: [facility] OR;  Service: Orthopedics;  Laterality: Right;  anesthesia:  general and block    REPLACEMENT OF WOUND VACUUM-ASSISTED CLOSURE DEVICE Right 2019    Procedure: REPLACEMENT, WOUND VAC;  Surgeon: Delio Chung MD;  Location: [facility] OR;  Service: Orthopedics;  Laterality: Right;    TONSILLECTOMY       Family History   Problem Relation Age of Onset    Alzheimer's disease Mother        Marital Status:   Alcohol History:  reports current alcohol use.  Tobacco History:  reports that she has been smoking cigarettes. She has a 15.00 pack-year smoking history. She has never used smokeless tobacco.  Drug History:  reports no history of drug use.    Health Maintenance Topics with due status: Not Due       Topic Last Completion Date    Mammogram 2019    DEXA SCAN 2019    TETANUS VACCINE 2019    Influenza Vaccine 2019    Lipid Panel 07/15/2020     Immunization History   Administered Date(s) Administered    Influenza - High Dose - PF (65 years and older) 2019    PPD Test 2019    Pneumococcal Conjugate - 13 Valent 2019    Pneumococcal Polysaccharide - 23 Valent 2020    Tdap 2019       Review of patient's allergies indicates:  No Known Allergies    Current Outpatient Medications:     ANORO ELLIPTA 62.5-25 mcg/actuation DsDv, Inhale 1 puff into the lungs daily as needed (SOB)., Disp: 1 each, Rfl: 5    atorvastatin (LIPITOR) 10 MG tablet, Take 1 tablet (10 mg total) by mouth every evening., Disp: 90 tablet, Rfl: 1    buPROPion (WELLBUTRIN SR) 150 MG TBSR 12 hr tablet, Take 1 tablet (150 mg total) by mouth 2  (two) times daily., Disp: 180 tablet, Rfl: 1    ferrous sulfate (FEOSOL) 325 mg (65 mg iron) Tab tablet, Take 1 tablet (325 mg total) by mouth once daily., Disp: , Rfl: 0    FLUoxetine 40 MG capsule, Take 1 capsule (40 mg total) by mouth once daily., Disp: 90 capsule, Rfl: 1    gabapentin (NEURONTIN) 300 MG capsule, Take 1 capsule (300 mg total) by mouth 3 (three) times daily., Disp: 90 capsule, Rfl: 5    loratadine (CLARITIN) 10 mg tablet, Take 10 mg by mouth once daily., Disp: , Rfl:     traZODone (DESYREL) 50 MG tablet, Take 1 tablet (50 mg total) by mouth every evening. (Patient taking differently: Take 50 mg by mouth continuous prn. ), Disp: 30 tablet, Rfl: 0    albuterol (PROVENTIL/VENTOLIN HFA) 90 mcg/actuation inhaler, Inhale 2 puffs into the lungs every 6 (six) hours as needed for Wheezing. Rescue, Disp: , Rfl:     [START ON 10/18/2020] HYDROcodone-acetaminophen (NORCO)  mg per tablet, Take 1 tablet by mouth every 8 (eight) hours as needed for Pain., Disp: 90 tablet, Rfl: 0    [START ON 9/18/2020] HYDROcodone-acetaminophen (NORCO)  mg per tablet, Take 1 tablet by mouth every 8 (eight) hours as needed for Pain., Disp: 90 tablet, Rfl: 0    [START ON 8/19/2020] HYDROcodone-acetaminophen (NORCO)  mg per tablet, Take 1 tablet by mouth every 8 (eight) hours as needed for Pain., Disp: 90 tablet, Rfl: 0    triamcinolone acetonide 0.1% (KENALOG) 0.1 % cream, Apply topically 2 (two) times daily., Disp: 45 g, Rfl: 2  No current facility-administered medications for this visit.     Facility-Administered Medications Ordered in Other Visits:     lidocaine (PF) 10 mg/ml (1%) injection 5 mg, 0.5 mL, Intradermal, Once, Azeem Anderson MD    Review of Systems   Constitutional: Negative for chills, fever and unexpected weight change (weight up 14 lbs since last visit).   HENT: Positive for postnasal drip.    Respiratory: Positive for cough, shortness of breath ( mild with walking longer  "distance) and wheezing ( rarely, hasn't used rescue inhaler recently). Negative for chest tightness.    Cardiovascular: Negative for chest pain, palpitations and leg swelling.   Gastrointestinal: Negative for abdominal pain, blood in stool, constipation and diarrhea.   Genitourinary: Negative for dysuria and hematuria.   Musculoskeletal: Positive for arthralgias ( right knee pain) and gait problem (mobility limited d/t knee pain).   Skin: Positive for rash (right inner lower leg).   Neurological: Negative for dizziness and headaches.          Objective:      Vitals:    07/21/20 0917   BP: 102/78   Pulse: 72   Temp: 98.3 °F (36.8 °C)   Weight: 127 kg (280 lb)   Height: 5' 3" (1.6 m)     Physical Exam  Vitals signs and nursing note reviewed.   Constitutional:       General: She is not in acute distress.     Appearance: She is well-developed. She is obese.   HENT:      Head: Normocephalic and atraumatic.      Right Ear: Tympanic membrane and ear canal normal.      Left Ear: Tympanic membrane and ear canal normal.      Mouth/Throat:      Pharynx: No posterior oropharyngeal erythema.   Neck:      Musculoskeletal: Neck supple.      Vascular: No carotid bruit.   Cardiovascular:      Rate and Rhythm: Normal rate and regular rhythm.      Heart sounds: No murmur. No friction rub. No gallop.    Pulmonary:      Effort: Pulmonary effort is normal. No respiratory distress.      Breath sounds: Normal breath sounds. No wheezing or rales.      Comments: Slightly diminished throughout otherwise clear  Abdominal:      General: There is no distension.      Palpations: Abdomen is soft.      Tenderness: There is no abdominal tenderness.   Musculoskeletal:      Right knee: She exhibits normal range of motion, no effusion and no erythema. Tenderness (tender to touch) found.      Right lower leg: No edema.      Left lower leg: No edema.   Lymphadenopathy:      Cervical: No cervical adenopathy.   Skin:     General: Skin is warm and dry.      " Findings: Rash present.          Neurological:      General: No focal deficit present.      Mental Status: She is alert and oriented to person, place, and time.      Gait: Gait normal.           Assessment:       1. Chronic obstructive pulmonary disease, unspecified COPD type    2. Dyslipidemia    3. Primary osteoarthritis of right knee    4. Dermatitis    5. Morbid obesity    6. Cigarette smoker    7. Mild episode of recurrent major depressive disorder    8. Need for 23-polyvalent pneumococcal polysaccharide vaccine           Plan:       Chronic obstructive pulmonary disease, unspecified COPD type  Comments:  stable at present though continues to smoke 5 cigs/day    Dyslipidemia  Comments:  reviewed recent labs with pt- well controlled    Primary osteoarthritis of right knee  Comments:  continued pain s/p TKR and knee infection- no erythema/swelling today- pain med refilled by Dr. You    Dermatitis  Comments:  mild dermatitis lower medial right calf, no swelling- will trt with steroid and then advised to use moisturizer daily  Orders:  -     triamcinolone acetonide 0.1% (KENALOG) 0.1 % cream; Apply topically 2 (two) times daily.  Dispense: 45 g; Refill: 2    Morbid obesity  Comments:  discussed further weight gain and stressed importance of weight loss to help improve her pain/mobility as well as lower CV risk    Cigarette smoker  Comments:  encouraged pt to call and schedule LDCT as previously ordered. Smoking cessation counseling provided    Mild episode of recurrent major depressive disorder  Comments:  stable on med  Orders:  -     FLUoxetine 40 MG capsule; Take 1 capsule (40 mg total) by mouth once daily.  Dispense: 90 capsule; Refill: 1    Need for 23-polyvalent pneumococcal polysaccharide vaccine  -     Pneumococcal Polysaccharide Vaccine (23 Valent) (SQ/IM)      Follow up in about 3 months (around 10/21/2020) for arthritis, COPD.        7/21/2020 Roselyn Cote NP

## 2020-08-05 ENCOUNTER — LAB VISIT (OUTPATIENT)
Dept: PRIMARY CARE CLINIC | Facility: OTHER | Age: 65
End: 2020-08-05
Attending: INTERNAL MEDICINE
Payer: MEDICARE

## 2020-08-05 DIAGNOSIS — Z11.59 SPECIAL SCREENING EXAMINATION FOR UNSPECIFIED VIRAL DISEASE: Primary | ICD-10-CM

## 2020-08-05 PROCEDURE — U0003 INFECTIOUS AGENT DETECTION BY NUCLEIC ACID (DNA OR RNA); SEVERE ACUTE RESPIRATORY SYNDROME CORONAVIRUS 2 (SARS-COV-2) (CORONAVIRUS DISEASE [COVID-19]), AMPLIFIED PROBE TECHNIQUE, MAKING USE OF HIGH THROUGHPUT TECHNOLOGIES AS DESCRIBED BY CMS-2020-01-R: HCPCS

## 2020-08-06 ENCOUNTER — PATIENT MESSAGE (OUTPATIENT)
Dept: FAMILY MEDICINE | Facility: CLINIC | Age: 65
End: 2020-08-06

## 2020-08-06 RX ORDER — AZITHROMYCIN 250 MG/1
TABLET, FILM COATED ORAL
Qty: 6 TABLET | Refills: 0 | Status: SHIPPED | OUTPATIENT
Start: 2020-08-06 | End: 2020-08-11

## 2020-08-07 LAB — SARS-COV-2 RNA RESP QL NAA+PROBE: DETECTED

## 2020-08-08 DIAGNOSIS — U07.1 COVID-19 VIRUS DETECTED: ICD-10-CM

## 2020-09-22 ENCOUNTER — HOSPITAL ENCOUNTER (OUTPATIENT)
Dept: RADIOLOGY | Facility: HOSPITAL | Age: 65
Discharge: HOME | End: 2020-09-22
Attending: NURSE PRACTITIONER
Payer: MEDICARE

## 2020-09-22 VITALS — WEIGHT: 280 LBS | HEIGHT: 63 IN | BODY MASS INDEX: 49.61 KG/M2

## 2020-09-22 DIAGNOSIS — Z12.31 SCREENING MAMMOGRAM, ENCOUNTER FOR: ICD-10-CM

## 2020-09-22 DIAGNOSIS — Z12.9 SCREENING FOR CANCER: ICD-10-CM

## 2020-09-22 DIAGNOSIS — F17.210 CIGARETTE SMOKER: ICD-10-CM

## 2020-09-22 PROCEDURE — 77067 SCR MAMMO BI INCL CAD: CPT | Mod: TC,PO

## 2020-09-22 PROCEDURE — G0297 LDCT FOR LUNG CA SCREEN: HCPCS | Mod: TC,PO

## 2020-09-23 NOTE — PROGRESS NOTES
Please call patient and let her know lung CT scan shows stable 4 mm nodule in the right upper lobe which has not changed.  No new nodules or masses are seen.

## 2020-09-24 ENCOUNTER — TELEPHONE (OUTPATIENT)
Dept: FAMILY MEDICINE | Facility: CLINIC | Age: 65
End: 2020-09-24

## 2020-09-24 NOTE — TELEPHONE ENCOUNTER
----- Message from [facility], RT sent at 9/24/2020  9:42 AM CDT -----  Patient would like samples of Anoro Inhaler. Pt # [phone]

## 2020-09-24 NOTE — TELEPHONE ENCOUNTER
Spoke to pt to let her know I will place 2 box of samples up front for her to . Pt stated she will stop by tomorrow to

## 2020-09-24 NOTE — TELEPHONE ENCOUNTER
----- Message from Roselyn Cote NP sent at 9/23/2020  5:21 PM CDT -----  Please call patient and let her know lung CT scan shows stable 4 mm nodule in the right upper lobe which has not changed.  No new nodules or masses are seen.

## 2020-09-24 NOTE — TELEPHONE ENCOUNTER
Spoke to patient with result verbatim per Roselyn. Verbalized understanding on all. Repeat in 1 year?

## 2020-09-28 NOTE — PROGRESS NOTES
Remind me created.    Roselyn Cote NP  5:30 PM  Note  Yes please repeat LDCT in 1 year    9:41 AM  Note  Spoke to patient with result verbatim per Roselyn. Verbalized understanding on all. Repeat in 1 year?

## 2020-10-21 ENCOUNTER — OFFICE VISIT (OUTPATIENT)
Dept: FAMILY MEDICINE | Facility: CLINIC | Age: 65
End: 2020-10-21
Payer: MEDICARE

## 2020-10-21 VITALS
OXYGEN SATURATION: 99 % | DIASTOLIC BLOOD PRESSURE: 80 MMHG | WEIGHT: 283 LBS | HEART RATE: 72 BPM | SYSTOLIC BLOOD PRESSURE: 136 MMHG | TEMPERATURE: 98 F | BODY MASS INDEX: 50.14 KG/M2 | HEIGHT: 63 IN

## 2020-10-21 DIAGNOSIS — R91.1 PULMONARY NODULE: ICD-10-CM

## 2020-10-21 DIAGNOSIS — E78.5 DYSLIPIDEMIA: ICD-10-CM

## 2020-10-21 DIAGNOSIS — Z79.899 LONG-TERM USE OF HIGH-RISK MEDICATION: ICD-10-CM

## 2020-10-21 DIAGNOSIS — E66.01 MORBID OBESITY DUE TO EXCESS CALORIES: ICD-10-CM

## 2020-10-21 DIAGNOSIS — F17.210 CIGARETTE SMOKER: ICD-10-CM

## 2020-10-21 DIAGNOSIS — G89.29 CHRONIC PAIN OF RIGHT KNEE: ICD-10-CM

## 2020-10-21 DIAGNOSIS — Z51.81 ENCOUNTER FOR THERAPEUTIC DRUG MONITORING: ICD-10-CM

## 2020-10-21 DIAGNOSIS — J44.9 CHRONIC OBSTRUCTIVE PULMONARY DISEASE, UNSPECIFIED COPD TYPE: Primary | ICD-10-CM

## 2020-10-21 DIAGNOSIS — Z12.11 COLON CANCER SCREENING: ICD-10-CM

## 2020-10-21 DIAGNOSIS — M25.561 CHRONIC PAIN OF RIGHT KNEE: ICD-10-CM

## 2020-10-21 DIAGNOSIS — F51.01 PRIMARY INSOMNIA: ICD-10-CM

## 2020-10-21 DIAGNOSIS — F33.0 MILD EPISODE OF RECURRENT MAJOR DEPRESSIVE DISORDER: ICD-10-CM

## 2020-10-21 PROCEDURE — 3008F BODY MASS INDEX DOCD: CPT | Mod: S$GLB,,, | Performed by: NURSE PRACTITIONER

## 2020-10-21 PROCEDURE — 3288F PR FALLS RISK ASSESSMENT DOCUMENTED: ICD-10-PCS | Mod: S$GLB,,, | Performed by: NURSE PRACTITIONER

## 2020-10-21 PROCEDURE — 1159F MED LIST DOCD IN RCRD: CPT | Mod: S$GLB,,, | Performed by: NURSE PRACTITIONER

## 2020-10-21 PROCEDURE — 3008F PR BODY MASS INDEX (BMI) DOCUMENTED: ICD-10-PCS | Mod: S$GLB,,, | Performed by: NURSE PRACTITIONER

## 2020-10-21 PROCEDURE — 3075F SYST BP GE 130 - 139MM HG: CPT | Mod: S$GLB,,, | Performed by: NURSE PRACTITIONER

## 2020-10-21 PROCEDURE — 3079F DIAST BP 80-89 MM HG: CPT | Mod: S$GLB,,, | Performed by: NURSE PRACTITIONER

## 2020-10-21 PROCEDURE — 3288F FALL RISK ASSESSMENT DOCD: CPT | Mod: S$GLB,,, | Performed by: NURSE PRACTITIONER

## 2020-10-21 PROCEDURE — 99214 OFFICE O/P EST MOD 30 MIN: CPT | Mod: S$GLB,,, | Performed by: NURSE PRACTITIONER

## 2020-10-21 PROCEDURE — 3079F PR MOST RECENT DIASTOLIC BLOOD PRESSURE 80-89 MM HG: ICD-10-PCS | Mod: S$GLB,,, | Performed by: NURSE PRACTITIONER

## 2020-10-21 PROCEDURE — 3075F PR MOST RECENT SYSTOLIC BLOOD PRESS GE 130-139MM HG: ICD-10-PCS | Mod: S$GLB,,, | Performed by: NURSE PRACTITIONER

## 2020-10-21 PROCEDURE — 1100F PR PT FALLS ASSESS DOC 2+ FALLS/FALL W/INJURY/YR: ICD-10-PCS | Mod: S$GLB,,, | Performed by: NURSE PRACTITIONER

## 2020-10-21 PROCEDURE — 1100F PTFALLS ASSESS-DOCD GE2>/YR: CPT | Mod: S$GLB,,, | Performed by: NURSE PRACTITIONER

## 2020-10-21 PROCEDURE — 99214 PR OFFICE/OUTPT VISIT, EST, LEVL IV, 30-39 MIN: ICD-10-PCS | Mod: S$GLB,,, | Performed by: NURSE PRACTITIONER

## 2020-10-21 PROCEDURE — 1159F PR MEDICATION LIST DOCUMENTED IN MEDICAL RECORD: ICD-10-PCS | Mod: S$GLB,,, | Performed by: NURSE PRACTITIONER

## 2020-10-21 RX ORDER — ATORVASTATIN CALCIUM 10 MG/1
10 TABLET, FILM COATED ORAL NIGHTLY
Qty: 90 TABLET | Refills: 1 | Status: SHIPPED | OUTPATIENT
Start: 2020-10-21 | End: 2021-04-21 | Stop reason: SDUPTHER

## 2020-10-21 RX ORDER — FLUTICASONE PROPIONATE 50 MCG
1 SPRAY, SUSPENSION (ML) NASAL DAILY
COMMUNITY
End: 2020-10-21 | Stop reason: SDUPTHER

## 2020-10-21 RX ORDER — UMECLIDINIUM BROMIDE AND VILANTEROL TRIFENATATE 62.5; 25 UG/1; UG/1
1 POWDER RESPIRATORY (INHALATION) DAILY PRN
Qty: 1 EACH | Refills: 5 | Status: SHIPPED | OUTPATIENT
Start: 2020-10-21 | End: 2021-04-21 | Stop reason: SDUPTHER

## 2020-10-21 RX ORDER — TRAZODONE HYDROCHLORIDE 50 MG/1
50 TABLET ORAL NIGHTLY PRN
Qty: 90 TABLET | Refills: 1 | Status: SHIPPED | OUTPATIENT
Start: 2020-10-21 | End: 2021-04-21 | Stop reason: SDUPTHER

## 2020-10-21 RX ORDER — BUPROPION HYDROCHLORIDE 150 MG/1
150 TABLET, EXTENDED RELEASE ORAL 2 TIMES DAILY
Qty: 180 TABLET | Refills: 1 | Status: SHIPPED | OUTPATIENT
Start: 2020-10-21 | End: 2021-04-21 | Stop reason: SDUPTHER

## 2020-10-21 RX ORDER — GABAPENTIN 300 MG/1
300 CAPSULE ORAL 3 TIMES DAILY
Qty: 270 CAPSULE | Refills: 1 | Status: SHIPPED | OUTPATIENT
Start: 2020-10-21 | End: 2021-04-21 | Stop reason: SDUPTHER

## 2020-10-21 RX ORDER — FLUOXETINE HYDROCHLORIDE 40 MG/1
40 CAPSULE ORAL DAILY
Qty: 90 CAPSULE | Refills: 1 | Status: SHIPPED | OUTPATIENT
Start: 2020-10-21 | End: 2021-04-21 | Stop reason: SDUPTHER

## 2020-10-21 RX ORDER — FLUTICASONE PROPIONATE 50 MCG
1 SPRAY, SUSPENSION (ML) NASAL DAILY
Qty: 16 G | Refills: 2 | Status: ON HOLD | OUTPATIENT
Start: 2020-10-21 | End: 2022-12-26 | Stop reason: HOSPADM

## 2020-10-21 RX ORDER — ALBUTEROL SULFATE 90 UG/1
2 AEROSOL, METERED RESPIRATORY (INHALATION) EVERY 6 HOURS PRN
Qty: 18 G | Refills: 2 | Status: SHIPPED | OUTPATIENT
Start: 2020-10-21 | End: 2023-05-08 | Stop reason: SDUPTHER

## 2020-10-21 NOTE — PROGRESS NOTES
SUBJECTIVE:    Patient ID: Iris Mueller is a 65 y.o. female.    Chief Complaint: Follow-up (3 month//brought bottles tb )    Pt here for regular f/u. Reports overall doing okay. Tested + for COVID in August but reports really didn't have any symptoms.  Fell 3 weeks ago stepping out of the bathtub- fell forward and caught herself with her hands before she hit her head. Left leg was hurting her for awhile but now improved- continues with chronic right knee pain  Still smoking a few cigs/day- denies any cough/wheezing. Using anoro daily but hasn't required albuterol      Lab Visit on 2020   Component Date Value Ref Range Status    SARS-CoV-2 RNA, Amplification, Qual 2020 Detected* Undetected Final       Past Medical History:   Diagnosis Date    Arthritis     Asthma     Encounter for blood transfusion     Hypertension     Wound infection 2019    Right knee     Past Surgical History:   Procedure Laterality Date     SECTION      JOINT REPLACEMENT      Knee    KNEE ARTHROPLASTY Right 2019    Procedure: ARTHROPLASTY, KNEE;  Surgeon: Delio Chung MD;  Location: [facility] OR;  Service: Orthopedics;  Laterality: Right;  anesthesia:  general and block    REPLACEMENT OF WOUND VACUUM-ASSISTED CLOSURE DEVICE Right 2019    Procedure: REPLACEMENT, WOUND VAC;  Surgeon: Delio Chung MD;  Location: [facility] OR;  Service: Orthopedics;  Laterality: Right;    TONSILLECTOMY       Family History   Problem Relation Age of Onset    Alzheimer's disease Mother        Marital Status:   Alcohol History:  reports current alcohol use.  Tobacco History:  reports that she has been smoking cigarettes. She has a 15.00 pack-year smoking history. She has never used smokeless tobacco.  Drug History:  reports no history of drug use.    Health Maintenance Topics with due status: Not Due       Topic Last Completion Date    DEXA SCAN 2019    TETANUS VACCINE 2019    Lipid Panel  07/15/2020    Mammogram 09/22/2020     Immunization History   Administered Date(s) Administered    Influenza - High Dose - PF (65 years and older) 09/18/2019    Influenza - Quadrivalent - High Dose - PF (65 years and older) 08/20/2020    PPD Test 09/04/2019    Pneumococcal Conjugate - 13 Valent 03/06/2019    Pneumococcal Polysaccharide - 23 Valent 07/21/2020    Tdap 09/18/2019       Review of patient's allergies indicates:  No Known Allergies    Current Outpatient Medications:     albuterol (PROVENTIL/VENTOLIN HFA) 90 mcg/actuation inhaler, Inhale 2 puffs into the lungs every 6 (six) hours as needed for Wheezing. Rescue, Disp: 18 g, Rfl: 2    ANORO ELLIPTA 62.5-25 mcg/actuation DsDv, Inhale 1 puff into the lungs daily as needed (SOB)., Disp: 1 each, Rfl: 5    atorvastatin (LIPITOR) 10 MG tablet, Take 1 tablet (10 mg total) by mouth every evening., Disp: 90 tablet, Rfl: 1    buPROPion (WELLBUTRIN SR) 150 MG TBSR 12 hr tablet, Take 1 tablet (150 mg total) by mouth 2 (two) times daily., Disp: 180 tablet, Rfl: 1    ferrous sulfate (FEOSOL) 325 mg (65 mg iron) Tab tablet, Take 1 tablet (325 mg total) by mouth once daily., Disp: , Rfl: 0    FLUoxetine 40 MG capsule, Take 1 capsule (40 mg total) by mouth once daily., Disp: 90 capsule, Rfl: 1    fluticasone propionate (FLONASE) 50 mcg/actuation nasal spray, 1 spray (50 mcg total) by Each Nostril route once daily., Disp: 16 g, Rfl: 2    gabapentin (NEURONTIN) 300 MG capsule, Take 1 capsule (300 mg total) by mouth 3 (three) times daily., Disp: 270 capsule, Rfl: 1    HYDROcodone-acetaminophen (NORCO)  mg per tablet, Take 1 tablet by mouth every 8 (eight) hours as needed for Pain., Disp: 90 tablet, Rfl: 0    loratadine (CLARITIN) 10 mg tablet, Take 10 mg by mouth once daily., Disp: , Rfl:     traZODone (DESYREL) 50 MG tablet, Take 1 tablet (50 mg total) by mouth nightly as needed., Disp: 90 tablet, Rfl: 1    triamcinolone acetonide 0.1% (KENALOG) 0.1 %  "cream, Apply topically 2 (two) times daily., Disp: 45 g, Rfl: 2  No current facility-administered medications for this visit.     Facility-Administered Medications Ordered in Other Visits:     lidocaine (PF) 10 mg/ml (1%) injection 5 mg, 0.5 mL, Intradermal, Once, Azeem Anderson MD    Review of Systems   Constitutional: Negative for chills, fever and unexpected weight change.   HENT: Negative for postnasal drip, sore throat and trouble swallowing.    Eyes: Negative for visual disturbance.   Respiratory: Positive for shortness of breath ( mild with walking longer distance). Negative for cough, chest tightness and wheezing (hasn't required albuterol recently).    Cardiovascular: Negative for chest pain, palpitations and leg swelling.   Gastrointestinal: Negative for abdominal pain, blood in stool, constipation and diarrhea.   Genitourinary: Negative for dysuria and hematuria.   Musculoskeletal: Positive for arthralgias ( right knee pain) and gait problem (mobility limited d/t knee pain).   Skin: Negative for rash.   Neurological: Negative for dizziness, syncope, speech difficulty, numbness and headaches.   Psychiatric/Behavioral: Negative for dysphoric mood. The patient is not nervous/anxious.           Objective:      Vitals:    10/21/20 0920   BP: 136/80   Pulse: 72   Temp: 98 °F (36.7 °C)   SpO2: 99%   Weight: 128.4 kg (283 lb)   Height: 5' 3" (1.6 m)     Physical Exam  Vitals signs and nursing note reviewed.   Constitutional:       General: She is not in acute distress.     Appearance: She is well-developed. She is obese.   HENT:      Head: Normocephalic and atraumatic.      Right Ear: Tympanic membrane and ear canal normal.      Left Ear: Tympanic membrane and ear canal normal.      Mouth/Throat:      Pharynx: No posterior oropharyngeal erythema.   Neck:      Musculoskeletal: Neck supple.      Vascular: No carotid bruit.   Cardiovascular:      Rate and Rhythm: Normal rate and regular rhythm.      Heart " sounds: No murmur. No friction rub. No gallop.    Pulmonary:      Effort: Pulmonary effort is normal. No respiratory distress.      Breath sounds: Normal breath sounds. No wheezing or rales.   Abdominal:      General: There is no distension.      Palpations: Abdomen is soft.      Tenderness: There is no abdominal tenderness.   Musculoskeletal:      Right knee: She exhibits normal range of motion, no effusion and no erythema. Tenderness (tender to touch) found.      Right lower leg: No edema.      Left lower leg: No edema.   Lymphadenopathy:      Cervical: No cervical adenopathy.   Skin:     General: Skin is warm and dry.      Findings: No rash.   Neurological:      General: No focal deficit present.      Mental Status: She is alert and oriented to person, place, and time.      Gait: Gait normal.           Assessment:       1. Chronic obstructive pulmonary disease, unspecified COPD type    2. Dyslipidemia    3. Chronic pain of right knee    4. Mild episode of recurrent major depressive disorder    5. Pulmonary nodule    6. Morbid obesity due to excess calories    7. Cigarette smoker    8. Primary insomnia    9. Colon cancer screening    10. Encounter for therapeutic drug monitoring    11. Long-term use of high-risk medication           Plan:       Chronic obstructive pulmonary disease, unspecified COPD type  Comments:  stable though still smoking  Orders:  -     ANORO ELLIPTA 62.5-25 mcg/actuation DsDv; Inhale 1 puff into the lungs daily as needed (SOB).  Dispense: 1 each; Refill: 5  -     albuterol (PROVENTIL/VENTOLIN HFA) 90 mcg/actuation inhaler; Inhale 2 puffs into the lungs every 6 (six) hours as needed for Wheezing. Rescue  Dispense: 18 g; Refill: 2    Dyslipidemia  Comments:  controlled on last labs  Orders:  -     atorvastatin (LIPITOR) 10 MG tablet; Take 1 tablet (10 mg total) by mouth every evening.  Dispense: 90 tablet; Refill: 1  -     CBC auto differential; Future; Expected date: 10/21/2020  -      Comprehensive Metabolic Panel; Future; Expected date: 10/21/2020  -     Lipid Panel; Future; Expected date: 10/21/2020  -     TSH w/reflex to FT4; Future; Expected date: 10/21/2020  -     Urinalysis, Reflex to Urine Culture Urine, Clean Catch; Future; Expected date: 10/21/2020  -     Microalbumin/Creatinine Ratio, Urine; Future; Expected date: 10/21/2020    Chronic pain of right knee  Comments:  chronic pain persists- again encouraged regular exercise and weight loss to help with pain/mobility- pain med to be refilled by Dr. You  Orders:  -     gabapentin (NEURONTIN) 300 MG capsule; Take 1 capsule (300 mg total) by mouth 3 (three) times daily.  Dispense: 270 capsule; Refill: 1  -     Pain Managment Profile 4, w/ Confirm, Ur; Future; Expected date: 10/21/2020    Mild episode of recurrent major depressive disorder  Comments:  stable on med  Orders:  -     buPROPion (WELLBUTRIN SR) 150 MG TBSR 12 hr tablet; Take 1 tablet (150 mg total) by mouth 2 (two) times daily.  Dispense: 180 tablet; Refill: 1  -     FLUoxetine 40 MG capsule; Take 1 capsule (40 mg total) by mouth once daily.  Dispense: 90 capsule; Refill: 1    Pulmonary nodule  Comments:  stable on recent LDCT    Morbid obesity due to excess calories  Comments:  again discussed importance of weight loss/healthy diet on overall health and quality of life- strongly encouraged her to make some changes    Cigarette smoker  Comments:  smoking 3-5 cigs/day; smoking cessation counseling provided  Orders:  -     buPROPion (WELLBUTRIN SR) 150 MG TBSR 12 hr tablet; Take 1 tablet (150 mg total) by mouth 2 (two) times daily.  Dispense: 180 tablet; Refill: 1    Primary insomnia  Comments:  stable on med  Orders:  -     traZODone (DESYREL) 50 MG tablet; Take 1 tablet (50 mg total) by mouth nightly as needed.  Dispense: 90 tablet; Refill: 1    Colon cancer screening  -     Cologuard Screening (Multitarget Stool DNA); Future; Expected date: 10/21/2020    Encounter for  therapeutic drug monitoring  -     Pain Managment Profile 4, w/ Confirm, Ur; Future; Expected date: 10/21/2020    Long-term use of high-risk medication  -     Pain Managment Profile 4, w/ Confirm, Ur; Future; Expected date: 10/21/2020    Other orders  -     fluticasone propionate (FLONASE) 50 mcg/actuation nasal spray; 1 spray (50 mcg total) by Each Nostril route once daily.  Dispense: 16 g; Refill: 2      Follow up in about 3 months (around 1/21/2021) for arthritis, COPD, Labs before next visit, lipids.        10/21/2020 Roselyn Cote NP

## 2020-10-22 DIAGNOSIS — M79.7 FIBROMYALGIA: ICD-10-CM

## 2020-10-22 DIAGNOSIS — M25.561 RIGHT KNEE PAIN, UNSPECIFIED CHRONICITY: ICD-10-CM

## 2020-10-22 RX ORDER — HYDROCODONE BITARTRATE AND ACETAMINOPHEN 10; 325 MG/1; MG/1
1 TABLET ORAL EVERY 8 HOURS PRN
Qty: 90 TABLET | Refills: 0 | Status: SHIPPED | OUTPATIENT
Start: 2020-10-22 | End: 2020-11-24 | Stop reason: SDUPTHER

## 2020-10-22 NOTE — TELEPHONE ENCOUNTER
----- Message from Maricel Boone sent at 10/22/2020 10:29 AM CDT -----  Regarding: Refill  Contact: Iris Mueller  Pt needs refill on Hydrocodone  Send to [facility]  Pt# [phone]

## 2020-11-24 DIAGNOSIS — M25.561 RIGHT KNEE PAIN, UNSPECIFIED CHRONICITY: ICD-10-CM

## 2020-11-24 DIAGNOSIS — M79.7 FIBROMYALGIA: ICD-10-CM

## 2020-11-24 RX ORDER — HYDROCODONE BITARTRATE AND ACETAMINOPHEN 10; 325 MG/1; MG/1
1 TABLET ORAL EVERY 8 HOURS PRN
Qty: 90 TABLET | Refills: 0 | Status: SHIPPED | OUTPATIENT
Start: 2020-11-24 | End: 2020-12-22 | Stop reason: SDUPTHER

## 2020-11-24 NOTE — TELEPHONE ENCOUNTER
----- Message from Laurie Camacho sent at 11/24/2020  9:55 AM CST -----  Regarding: refills  Pain med   Pharm [facility]   Pt [phone]

## 2020-12-22 DIAGNOSIS — M79.7 FIBROMYALGIA: ICD-10-CM

## 2020-12-22 DIAGNOSIS — M25.561 RIGHT KNEE PAIN, UNSPECIFIED CHRONICITY: ICD-10-CM

## 2020-12-22 RX ORDER — HYDROCODONE BITARTRATE AND ACETAMINOPHEN 10; 325 MG/1; MG/1
1 TABLET ORAL EVERY 8 HOURS PRN
Qty: 90 TABLET | Refills: 0 | Status: SHIPPED | OUTPATIENT
Start: 2020-12-22 | End: 2021-01-22 | Stop reason: SDUPTHER

## 2020-12-22 NOTE — TELEPHONE ENCOUNTER
----- Message from Carmen Hemphill sent at 12/22/2020  9:26 AM CST -----  Pt calling for refill on Hydrocodone to [facility]  # [phone]

## 2020-12-31 ENCOUNTER — TELEPHONE (OUTPATIENT)
Dept: FAMILY MEDICINE | Facility: CLINIC | Age: 65
End: 2020-12-31

## 2021-01-08 ENCOUNTER — TELEPHONE (OUTPATIENT)
Dept: FAMILY MEDICINE | Facility: CLINIC | Age: 66
End: 2021-01-08

## 2021-01-21 ENCOUNTER — OFFICE VISIT (OUTPATIENT)
Dept: FAMILY MEDICINE | Facility: CLINIC | Age: 66
End: 2021-01-21
Payer: MEDICARE

## 2021-01-21 VITALS
HEIGHT: 63 IN | BODY MASS INDEX: 49.61 KG/M2 | HEART RATE: 76 BPM | DIASTOLIC BLOOD PRESSURE: 88 MMHG | WEIGHT: 280 LBS | SYSTOLIC BLOOD PRESSURE: 138 MMHG

## 2021-01-21 DIAGNOSIS — E66.01 MORBID OBESITY DUE TO EXCESS CALORIES: ICD-10-CM

## 2021-01-21 DIAGNOSIS — Z12.11 COLON CANCER SCREENING: ICD-10-CM

## 2021-01-21 DIAGNOSIS — R91.1 PULMONARY NODULE: ICD-10-CM

## 2021-01-21 DIAGNOSIS — M25.561 CHRONIC PAIN OF RIGHT KNEE: ICD-10-CM

## 2021-01-21 DIAGNOSIS — J44.9 CHRONIC OBSTRUCTIVE PULMONARY DISEASE, UNSPECIFIED COPD TYPE: Primary | ICD-10-CM

## 2021-01-21 DIAGNOSIS — I10 STAGE 1 HYPERTENSION: ICD-10-CM

## 2021-01-21 DIAGNOSIS — F33.0 MILD EPISODE OF RECURRENT MAJOR DEPRESSIVE DISORDER: ICD-10-CM

## 2021-01-21 DIAGNOSIS — E78.5 DYSLIPIDEMIA: ICD-10-CM

## 2021-01-21 DIAGNOSIS — N18.31 STAGE 3A CHRONIC KIDNEY DISEASE: ICD-10-CM

## 2021-01-21 DIAGNOSIS — G89.29 CHRONIC PAIN OF RIGHT KNEE: ICD-10-CM

## 2021-01-21 PROBLEM — A41.9 SEPTIC SHOCK: Status: RESOLVED | Noted: 2019-08-31 | Resolved: 2021-01-21

## 2021-01-21 PROBLEM — M00.9 INFECTION OF RIGHT KNEE: Status: RESOLVED | Noted: 2019-09-12 | Resolved: 2021-01-21

## 2021-01-21 PROBLEM — R65.20 SEVERE SEPSIS: Status: RESOLVED | Noted: 2019-08-29 | Resolved: 2021-01-21

## 2021-01-21 PROBLEM — N28.9 ACUTE RENAL INSUFFICIENCY: Status: RESOLVED | Noted: 2019-08-15 | Resolved: 2021-01-21

## 2021-01-21 PROBLEM — R65.21 SEPTIC SHOCK: Status: RESOLVED | Noted: 2019-08-31 | Resolved: 2021-01-21

## 2021-01-21 PROBLEM — S81.001A OPEN KNEE WOUND, RIGHT, INITIAL ENCOUNTER: Status: RESOLVED | Noted: 2019-10-07 | Resolved: 2021-01-21

## 2021-01-21 PROBLEM — T81.49XA INFECTED INCISION: Status: RESOLVED | Noted: 2019-08-29 | Resolved: 2021-01-21

## 2021-01-21 PROBLEM — D75.839 THROMBOCYTOSIS: Status: RESOLVED | Noted: 2019-09-12 | Resolved: 2021-01-21

## 2021-01-21 PROBLEM — I50.31 ACUTE DIASTOLIC HEART FAILURE: Status: RESOLVED | Noted: 2019-09-14 | Resolved: 2021-01-21

## 2021-01-21 PROBLEM — T81.31XD SURGICAL WOUND DEHISCENCE, SUBSEQUENT ENCOUNTER: Status: RESOLVED | Noted: 2019-09-13 | Resolved: 2021-01-21

## 2021-01-21 PROBLEM — A41.9 SEVERE SEPSIS: Status: RESOLVED | Noted: 2019-08-29 | Resolved: 2021-01-21

## 2021-01-21 PROCEDURE — 99214 PR OFFICE/OUTPT VISIT, EST, LEVL IV, 30-39 MIN: ICD-10-PCS | Mod: S$GLB,,, | Performed by: NURSE PRACTITIONER

## 2021-01-21 PROCEDURE — 1159F MED LIST DOCD IN RCRD: CPT | Mod: S$GLB,,, | Performed by: NURSE PRACTITIONER

## 2021-01-21 PROCEDURE — 1159F PR MEDICATION LIST DOCUMENTED IN MEDICAL RECORD: ICD-10-PCS | Mod: S$GLB,,, | Performed by: NURSE PRACTITIONER

## 2021-01-21 PROCEDURE — 3079F PR MOST RECENT DIASTOLIC BLOOD PRESSURE 80-89 MM HG: ICD-10-PCS | Mod: S$GLB,,, | Performed by: NURSE PRACTITIONER

## 2021-01-21 PROCEDURE — 3075F SYST BP GE 130 - 139MM HG: CPT | Mod: S$GLB,,, | Performed by: NURSE PRACTITIONER

## 2021-01-21 PROCEDURE — 3075F PR MOST RECENT SYSTOLIC BLOOD PRESS GE 130-139MM HG: ICD-10-PCS | Mod: S$GLB,,, | Performed by: NURSE PRACTITIONER

## 2021-01-21 PROCEDURE — 3008F PR BODY MASS INDEX (BMI) DOCUMENTED: ICD-10-PCS | Mod: S$GLB,,, | Performed by: NURSE PRACTITIONER

## 2021-01-21 PROCEDURE — 99214 OFFICE O/P EST MOD 30 MIN: CPT | Mod: S$GLB,,, | Performed by: NURSE PRACTITIONER

## 2021-01-21 PROCEDURE — 3079F DIAST BP 80-89 MM HG: CPT | Mod: S$GLB,,, | Performed by: NURSE PRACTITIONER

## 2021-01-21 PROCEDURE — 3008F BODY MASS INDEX DOCD: CPT | Mod: S$GLB,,, | Performed by: NURSE PRACTITIONER

## 2021-01-21 RX ORDER — LISINOPRIL 10 MG/1
10 TABLET ORAL DAILY
Qty: 90 TABLET | Refills: 1 | Status: SHIPPED | OUTPATIENT
Start: 2021-01-21 | End: 2021-07-21 | Stop reason: SDUPTHER

## 2021-01-22 DIAGNOSIS — M79.7 FIBROMYALGIA: ICD-10-CM

## 2021-01-22 DIAGNOSIS — M25.561 RIGHT KNEE PAIN, UNSPECIFIED CHRONICITY: ICD-10-CM

## 2021-01-22 LAB
ALBUMIN SERPL-MCNC: 4.1 G/DL (ref 3.6–5.1)
ALBUMIN/GLOB SERPL: 1.5 (CALC) (ref 1–2.5)
ALP SERPL-CCNC: 89 U/L (ref 37–153)
ALT SERPL-CCNC: 8 U/L (ref 6–29)
AMPHETAMINES UR QL: NEGATIVE NG/ML
AST SERPL-CCNC: 11 U/L (ref 10–35)
BARBITURATES UR QL: NEGATIVE NG/ML
BASOPHILS # BLD AUTO: 29 CELLS/UL (ref 0–200)
BASOPHILS NFR BLD AUTO: 0.5 %
BENZODIAZ UR QL: NEGATIVE NG/ML
BILIRUB SERPL-MCNC: 0.5 MG/DL (ref 0.2–1.2)
BUN SERPL-MCNC: 16 MG/DL (ref 7–25)
BUN/CREAT SERPL: 14 (CALC) (ref 6–22)
BZE UR QL: NEGATIVE NG/ML
CALCIUM SERPL-MCNC: 9.2 MG/DL (ref 8.6–10.4)
CHLORIDE SERPL-SCNC: 102 MMOL/L (ref 98–110)
CHOLEST SERPL-MCNC: 180 MG/DL
CHOLEST/HDLC SERPL: 2.7 (CALC)
CO2 SERPL-SCNC: 25 MMOL/L (ref 20–32)
CODEINE UR-MCNC: NEGATIVE NG/ML
COMMENT: ABNORMAL
CREAT SERPL-MCNC: 1.18 MG/DL (ref 0.5–0.99)
CREAT UR-MCNC: 49.7 MG/DL
DRUG SCREEN COMMENT UR-IMP: ABNORMAL
EOSINOPHIL # BLD AUTO: 191 CELLS/UL (ref 15–500)
EOSINOPHIL NFR BLD AUTO: 3.3 %
ERYTHROCYTE [DISTWIDTH] IN BLOOD BY AUTOMATED COUNT: 13.8 % (ref 11–15)
GFRSERPLBLD MDRD-ARVRAT: 48 ML/MIN/1.73M2
GLOBULIN SER CALC-MCNC: 2.7 G/DL (CALC) (ref 1.9–3.7)
GLUCOSE SERPL-MCNC: 81 MG/DL (ref 65–99)
HCT VFR BLD AUTO: 41.3 % (ref 35–45)
HDLC SERPL-MCNC: 67 MG/DL
HGB BLD-MCNC: 13 G/DL (ref 11.7–15.5)
HYDROCODONE UR-MCNC: 324 NG/ML
HYDROMORPHONE UR-MCNC: NEGATIVE NG/ML
LDLC SERPL CALC-MCNC: 94 MG/DL (CALC)
LYMPHOCYTES # BLD AUTO: 1114 CELLS/UL (ref 850–3900)
LYMPHOCYTES NFR BLD AUTO: 19.2 %
MCH RBC QN AUTO: 30 PG (ref 27–33)
MCHC RBC AUTO-ENTMCNC: 31.5 G/DL (ref 32–36)
MCV RBC AUTO: 95.2 FL (ref 80–100)
MEDICATION PRESCRIBED: ABNORMAL
METHADONE UR QL: NEGATIVE NG/ML
MONOCYTES # BLD AUTO: 429 CELLS/UL (ref 200–950)
MONOCYTES NFR BLD AUTO: 7.4 %
MORPHINE UR-MCNC: NEGATIVE NG/ML
NEUTROPHILS # BLD AUTO: 4037 CELLS/UL (ref 1500–7800)
NEUTROPHILS NFR BLD AUTO: 69.6 %
NONHDLC SERPL-MCNC: 113 MG/DL (CALC)
NORHYDROCODONE UR CFM-MCNC: 166 NG/ML
OPIATES UR QL: POSITIVE NG/ML
OXIDANTS UR QL: NEGATIVE MCG/ML
OXYCODONE UR QL: NEGATIVE NG/ML
PCP UR QL: NEGATIVE NG/ML
PH UR: 6 [PH] (ref 4.5–9)
PLATELET # BLD AUTO: 347 THOUSAND/UL (ref 140–400)
PMV BLD REES-ECKER: 9.4 FL (ref 7.5–12.5)
POTASSIUM SERPL-SCNC: 4.1 MMOL/L (ref 3.5–5.3)
PROT SERPL-MCNC: 6.8 G/DL (ref 6.1–8.1)
RBC # BLD AUTO: 4.34 MILLION/UL (ref 3.8–5.1)
SODIUM SERPL-SCNC: 136 MMOL/L (ref 135–146)
TRIGL SERPL-MCNC: 91 MG/DL
TSH SERPL-ACNC: 1.73 MIU/L (ref 0.4–4.5)
WBC # BLD AUTO: 5.8 THOUSAND/UL (ref 3.8–10.8)

## 2021-01-22 RX ORDER — HYDROCODONE BITARTRATE AND ACETAMINOPHEN 10; 325 MG/1; MG/1
1 TABLET ORAL EVERY 8 HOURS PRN
Qty: 90 TABLET | Refills: 0 | Status: SHIPPED | OUTPATIENT
Start: 2021-01-22 | End: 2021-02-22 | Stop reason: SDUPTHER

## 2021-02-22 DIAGNOSIS — M79.7 FIBROMYALGIA: ICD-10-CM

## 2021-02-22 DIAGNOSIS — M25.561 RIGHT KNEE PAIN, UNSPECIFIED CHRONICITY: ICD-10-CM

## 2021-02-22 RX ORDER — HYDROCODONE BITARTRATE AND ACETAMINOPHEN 10; 325 MG/1; MG/1
1 TABLET ORAL EVERY 8 HOURS PRN
Qty: 90 TABLET | Refills: 0 | Status: SHIPPED | OUTPATIENT
Start: 2021-02-22 | End: 2021-03-22 | Stop reason: SDUPTHER

## 2021-03-22 DIAGNOSIS — M79.7 FIBROMYALGIA: ICD-10-CM

## 2021-03-22 DIAGNOSIS — M25.561 RIGHT KNEE PAIN, UNSPECIFIED CHRONICITY: ICD-10-CM

## 2021-03-22 RX ORDER — HYDROCODONE BITARTRATE AND ACETAMINOPHEN 10; 325 MG/1; MG/1
1 TABLET ORAL EVERY 8 HOURS PRN
Qty: 90 TABLET | Refills: 0 | Status: SHIPPED | OUTPATIENT
Start: 2021-03-22 | End: 2021-04-22 | Stop reason: SDUPTHER

## 2021-03-22 RX ORDER — BETAMETHASONE DIPROPIONATE 0.5 MG/G
CREAM TOPICAL 2 TIMES DAILY
Qty: 45 G | Refills: 2 | Status: SHIPPED | OUTPATIENT
Start: 2021-03-22 | End: 2022-07-07 | Stop reason: SDUPTHER

## 2021-04-16 NOTE — CARE UPDATE
09/01/19 0715   Patient Assessment/Suction   Level of Consciousness (AVPU) alert   PRE-TX-O2   O2 Device (Oxygen Therapy) room air   SpO2 96 %   Pulse Oximetry Type Continuous   $ Pulse Oximetry - Multiple Charge Pulse Oximetry - Multiple   Pulse 65   Resp (!) 21   Aerosol Therapy   $ Aerosol Therapy Charges PRN treatment not required        same name as above

## 2021-04-21 ENCOUNTER — OFFICE VISIT (OUTPATIENT)
Dept: FAMILY MEDICINE | Facility: CLINIC | Age: 66
End: 2021-04-21
Payer: MEDICARE

## 2021-04-21 VITALS
OXYGEN SATURATION: 98 % | WEIGHT: 288 LBS | SYSTOLIC BLOOD PRESSURE: 132 MMHG | DIASTOLIC BLOOD PRESSURE: 84 MMHG | HEIGHT: 63 IN | HEART RATE: 70 BPM | BODY MASS INDEX: 51.03 KG/M2

## 2021-04-21 DIAGNOSIS — F17.210 CIGARETTE SMOKER: ICD-10-CM

## 2021-04-21 DIAGNOSIS — M25.561 CHRONIC PAIN OF RIGHT KNEE: ICD-10-CM

## 2021-04-21 DIAGNOSIS — G89.29 CHRONIC PAIN OF RIGHT KNEE: ICD-10-CM

## 2021-04-21 DIAGNOSIS — E78.5 DYSLIPIDEMIA: ICD-10-CM

## 2021-04-21 DIAGNOSIS — F51.01 PRIMARY INSOMNIA: ICD-10-CM

## 2021-04-21 DIAGNOSIS — F33.0 MILD EPISODE OF RECURRENT MAJOR DEPRESSIVE DISORDER: ICD-10-CM

## 2021-04-21 DIAGNOSIS — J44.9 CHRONIC OBSTRUCTIVE PULMONARY DISEASE, UNSPECIFIED COPD TYPE: Primary | ICD-10-CM

## 2021-04-21 PROCEDURE — 3008F PR BODY MASS INDEX (BMI) DOCUMENTED: ICD-10-PCS | Mod: S$GLB,,, | Performed by: NURSE PRACTITIONER

## 2021-04-21 PROCEDURE — 1159F MED LIST DOCD IN RCRD: CPT | Mod: S$GLB,,, | Performed by: NURSE PRACTITIONER

## 2021-04-21 PROCEDURE — 3008F BODY MASS INDEX DOCD: CPT | Mod: S$GLB,,, | Performed by: NURSE PRACTITIONER

## 2021-04-21 PROCEDURE — 99214 OFFICE O/P EST MOD 30 MIN: CPT | Mod: S$GLB,,, | Performed by: NURSE PRACTITIONER

## 2021-04-21 PROCEDURE — 1100F PR PT FALLS ASSESS DOC 2+ FALLS/FALL W/INJURY/YR: ICD-10-PCS | Mod: S$GLB,,, | Performed by: NURSE PRACTITIONER

## 2021-04-21 PROCEDURE — 3288F PR FALLS RISK ASSESSMENT DOCUMENTED: ICD-10-PCS | Mod: S$GLB,,, | Performed by: NURSE PRACTITIONER

## 2021-04-21 PROCEDURE — 99214 PR OFFICE/OUTPT VISIT, EST, LEVL IV, 30-39 MIN: ICD-10-PCS | Mod: S$GLB,,, | Performed by: NURSE PRACTITIONER

## 2021-04-21 PROCEDURE — 1100F PTFALLS ASSESS-DOCD GE2>/YR: CPT | Mod: S$GLB,,, | Performed by: NURSE PRACTITIONER

## 2021-04-21 PROCEDURE — 3288F FALL RISK ASSESSMENT DOCD: CPT | Mod: S$GLB,,, | Performed by: NURSE PRACTITIONER

## 2021-04-21 PROCEDURE — 1159F PR MEDICATION LIST DOCUMENTED IN MEDICAL RECORD: ICD-10-PCS | Mod: S$GLB,,, | Performed by: NURSE PRACTITIONER

## 2021-04-21 RX ORDER — GABAPENTIN 300 MG/1
300 CAPSULE ORAL 3 TIMES DAILY
Qty: 270 CAPSULE | Refills: 1 | Status: SHIPPED | OUTPATIENT
Start: 2021-04-21 | End: 2021-10-25 | Stop reason: SDUPTHER

## 2021-04-21 RX ORDER — FLUOXETINE HYDROCHLORIDE 40 MG/1
40 CAPSULE ORAL DAILY
Qty: 90 CAPSULE | Refills: 1 | Status: SHIPPED | OUTPATIENT
Start: 2021-04-21 | End: 2021-07-21 | Stop reason: SDUPTHER

## 2021-04-21 RX ORDER — BUPROPION HYDROCHLORIDE 150 MG/1
150 TABLET, EXTENDED RELEASE ORAL 2 TIMES DAILY
Qty: 180 TABLET | Refills: 1 | Status: SHIPPED | OUTPATIENT
Start: 2021-04-21 | End: 2021-12-06 | Stop reason: SDUPTHER

## 2021-04-21 RX ORDER — UMECLIDINIUM BROMIDE AND VILANTEROL TRIFENATATE 62.5; 25 UG/1; UG/1
1 POWDER RESPIRATORY (INHALATION) DAILY PRN
Qty: 1 EACH | Refills: 5 | Status: SHIPPED | OUTPATIENT
Start: 2021-04-21 | End: 2022-01-25 | Stop reason: SDUPTHER

## 2021-04-21 RX ORDER — ATORVASTATIN CALCIUM 10 MG/1
10 TABLET, FILM COATED ORAL NIGHTLY
Qty: 90 TABLET | Refills: 1 | Status: SHIPPED | OUTPATIENT
Start: 2021-04-21 | End: 2021-12-06 | Stop reason: SDUPTHER

## 2021-04-21 RX ORDER — TRAZODONE HYDROCHLORIDE 50 MG/1
50 TABLET ORAL NIGHTLY PRN
Qty: 90 TABLET | Refills: 1 | Status: SHIPPED | OUTPATIENT
Start: 2021-04-21 | End: 2022-01-25 | Stop reason: SDUPTHER

## 2021-04-22 DIAGNOSIS — M25.561 RIGHT KNEE PAIN, UNSPECIFIED CHRONICITY: ICD-10-CM

## 2021-04-22 DIAGNOSIS — M79.7 FIBROMYALGIA: ICD-10-CM

## 2021-04-22 RX ORDER — HYDROCODONE BITARTRATE AND ACETAMINOPHEN 10; 325 MG/1; MG/1
1 TABLET ORAL EVERY 8 HOURS PRN
Qty: 90 TABLET | Refills: 0 | Status: SHIPPED | OUTPATIENT
Start: 2021-04-22 | End: 2021-05-20 | Stop reason: SDUPTHER

## 2021-05-18 ENCOUNTER — TELEPHONE (OUTPATIENT)
Dept: FAMILY MEDICINE | Facility: CLINIC | Age: 66
End: 2021-05-18

## 2021-05-20 DIAGNOSIS — M25.561 RIGHT KNEE PAIN, UNSPECIFIED CHRONICITY: ICD-10-CM

## 2021-05-20 DIAGNOSIS — M79.7 FIBROMYALGIA: ICD-10-CM

## 2021-05-20 RX ORDER — HYDROCODONE BITARTRATE AND ACETAMINOPHEN 10; 325 MG/1; MG/1
1 TABLET ORAL EVERY 8 HOURS PRN
Qty: 90 TABLET | Refills: 0 | Status: SHIPPED | OUTPATIENT
Start: 2021-06-20 | End: 2021-08-19 | Stop reason: SDUPTHER

## 2021-05-20 RX ORDER — HYDROCODONE BITARTRATE AND ACETAMINOPHEN 10; 325 MG/1; MG/1
1 TABLET ORAL EVERY 8 HOURS PRN
Qty: 90 TABLET | Refills: 0 | Status: SHIPPED | OUTPATIENT
Start: 2021-05-20 | End: 2021-07-21 | Stop reason: SDUPTHER

## 2021-05-25 ENCOUNTER — TELEPHONE (OUTPATIENT)
Dept: FAMILY MEDICINE | Facility: CLINIC | Age: 66
End: 2021-05-25

## 2021-06-18 ENCOUNTER — TELEPHONE (OUTPATIENT)
Dept: FAMILY MEDICINE | Facility: CLINIC | Age: 66
End: 2021-06-18

## 2021-06-30 ENCOUNTER — TELEPHONE (OUTPATIENT)
Dept: FAMILY MEDICINE | Facility: CLINIC | Age: 66
End: 2021-06-30

## 2021-07-16 LAB
ALBUMIN SERPL-MCNC: 4.2 G/DL (ref 3.6–5.1)
ALBUMIN/CREAT UR: 5 MCG/MG CREAT
ALBUMIN/GLOB SERPL: 1.7 (CALC) (ref 1–2.5)
ALP SERPL-CCNC: 85 U/L (ref 37–153)
ALT SERPL-CCNC: 7 U/L (ref 6–29)
APPEARANCE UR: CLEAR
AST SERPL-CCNC: 11 U/L (ref 10–35)
BACTERIA #/AREA URNS HPF: ABNORMAL /HPF
BACTERIA UR CULT: ABNORMAL
BILIRUB SERPL-MCNC: 0.6 MG/DL (ref 0.2–1.2)
BILIRUB UR QL STRIP: NEGATIVE
BUN SERPL-MCNC: 12 MG/DL (ref 7–25)
BUN/CREAT SERPL: 10 (CALC) (ref 6–22)
CALCIUM SERPL-MCNC: 9.7 MG/DL (ref 8.6–10.4)
CHLORIDE SERPL-SCNC: 98 MMOL/L (ref 98–110)
CHOLEST SERPL-MCNC: 197 MG/DL
CHOLEST/HDLC SERPL: 2.8 (CALC)
CO2 SERPL-SCNC: 26 MMOL/L (ref 20–32)
COLOR UR: YELLOW
CREAT SERPL-MCNC: 1.2 MG/DL (ref 0.5–0.99)
CREAT UR-MCNC: 41 MG/DL (ref 20–275)
GLOBULIN SER CALC-MCNC: 2.5 G/DL (CALC) (ref 1.9–3.7)
GLUCOSE SERPL-MCNC: 74 MG/DL (ref 65–99)
GLUCOSE UR QL STRIP: NEGATIVE
HDLC SERPL-MCNC: 70 MG/DL
HGB UR QL STRIP: NEGATIVE
HYALINE CASTS #/AREA URNS LPF: ABNORMAL /LPF
KETONES UR QL STRIP: NEGATIVE
LDLC SERPL CALC-MCNC: 109 MG/DL (CALC)
LEUKOCYTE ESTERASE UR QL STRIP: NEGATIVE
MICROALBUMIN UR-MCNC: 0.2 MG/DL
NITRITE UR QL STRIP: NEGATIVE
NONHDLC SERPL-MCNC: 127 MG/DL (CALC)
PH UR STRIP: 6.5 [PH] (ref 5–8)
POTASSIUM SERPL-SCNC: 4.2 MMOL/L (ref 3.5–5.3)
PROT SERPL-MCNC: 6.7 G/DL (ref 6.1–8.1)
PROT UR QL STRIP: NEGATIVE
RBC #/AREA URNS HPF: ABNORMAL /HPF
SODIUM SERPL-SCNC: 136 MMOL/L (ref 135–146)
SP GR UR STRIP: 1.01 (ref 1–1.03)
SQUAMOUS #/AREA URNS HPF: ABNORMAL /HPF
TRIGL SERPL-MCNC: 89 MG/DL
WBC #/AREA URNS HPF: ABNORMAL /HPF

## 2021-07-19 ENCOUNTER — TELEPHONE (OUTPATIENT)
Dept: FAMILY MEDICINE | Facility: CLINIC | Age: 66
End: 2021-07-19

## 2021-07-21 ENCOUNTER — OFFICE VISIT (OUTPATIENT)
Dept: FAMILY MEDICINE | Facility: CLINIC | Age: 66
End: 2021-07-21
Payer: MEDICARE

## 2021-07-21 VITALS
HEART RATE: 80 BPM | OXYGEN SATURATION: 97 % | DIASTOLIC BLOOD PRESSURE: 70 MMHG | HEIGHT: 63 IN | SYSTOLIC BLOOD PRESSURE: 120 MMHG | WEIGHT: 276.81 LBS | BODY MASS INDEX: 49.05 KG/M2

## 2021-07-21 DIAGNOSIS — M25.561 CHRONIC PAIN OF RIGHT KNEE: ICD-10-CM

## 2021-07-21 DIAGNOSIS — J30.9 ALLERGIC RHINITIS, UNSPECIFIED SEASONALITY, UNSPECIFIED TRIGGER: ICD-10-CM

## 2021-07-21 DIAGNOSIS — I10 ESSENTIAL HYPERTENSION: ICD-10-CM

## 2021-07-21 DIAGNOSIS — M79.7 FIBROMYALGIA: ICD-10-CM

## 2021-07-21 DIAGNOSIS — J44.9 CHRONIC OBSTRUCTIVE PULMONARY DISEASE, UNSPECIFIED COPD TYPE: Primary | ICD-10-CM

## 2021-07-21 DIAGNOSIS — F17.210 CIGARETTE SMOKER: ICD-10-CM

## 2021-07-21 DIAGNOSIS — G89.29 CHRONIC PAIN OF RIGHT KNEE: ICD-10-CM

## 2021-07-21 DIAGNOSIS — M25.561 RIGHT KNEE PAIN, UNSPECIFIED CHRONICITY: ICD-10-CM

## 2021-07-21 DIAGNOSIS — N18.31 STAGE 3A CHRONIC KIDNEY DISEASE: ICD-10-CM

## 2021-07-21 DIAGNOSIS — F33.0 MILD EPISODE OF RECURRENT MAJOR DEPRESSIVE DISORDER: ICD-10-CM

## 2021-07-21 DIAGNOSIS — E78.5 DYSLIPIDEMIA: ICD-10-CM

## 2021-07-21 PROCEDURE — 96372 PR INJECTION,THERAP/PROPH/DIAG2ST, IM OR SUBCUT: ICD-10-PCS | Mod: S$GLB,,, | Performed by: NURSE PRACTITIONER

## 2021-07-21 PROCEDURE — 1101F PR PT FALLS ASSESS DOC 0-1 FALLS W/OUT INJ PAST YR: ICD-10-PCS | Mod: S$GLB,,, | Performed by: NURSE PRACTITIONER

## 2021-07-21 PROCEDURE — 3288F PR FALLS RISK ASSESSMENT DOCUMENTED: ICD-10-PCS | Mod: S$GLB,,, | Performed by: NURSE PRACTITIONER

## 2021-07-21 PROCEDURE — 3074F PR MOST RECENT SYSTOLIC BLOOD PRESSURE < 130 MM HG: ICD-10-PCS | Mod: S$GLB,,, | Performed by: NURSE PRACTITIONER

## 2021-07-21 PROCEDURE — 99214 PR OFFICE/OUTPT VISIT, EST, LEVL IV, 30-39 MIN: ICD-10-PCS | Mod: 25,S$GLB,, | Performed by: NURSE PRACTITIONER

## 2021-07-21 PROCEDURE — 1159F PR MEDICATION LIST DOCUMENTED IN MEDICAL RECORD: ICD-10-PCS | Mod: S$GLB,,, | Performed by: NURSE PRACTITIONER

## 2021-07-21 PROCEDURE — 99214 OFFICE O/P EST MOD 30 MIN: CPT | Mod: 25,S$GLB,, | Performed by: NURSE PRACTITIONER

## 2021-07-21 PROCEDURE — 3078F DIAST BP <80 MM HG: CPT | Mod: S$GLB,,, | Performed by: NURSE PRACTITIONER

## 2021-07-21 PROCEDURE — 1101F PT FALLS ASSESS-DOCD LE1/YR: CPT | Mod: S$GLB,,, | Performed by: NURSE PRACTITIONER

## 2021-07-21 PROCEDURE — 3008F BODY MASS INDEX DOCD: CPT | Mod: S$GLB,,, | Performed by: NURSE PRACTITIONER

## 2021-07-21 PROCEDURE — 3008F PR BODY MASS INDEX (BMI) DOCUMENTED: ICD-10-PCS | Mod: S$GLB,,, | Performed by: NURSE PRACTITIONER

## 2021-07-21 PROCEDURE — 1159F MED LIST DOCD IN RCRD: CPT | Mod: S$GLB,,, | Performed by: NURSE PRACTITIONER

## 2021-07-21 PROCEDURE — 3078F PR MOST RECENT DIASTOLIC BLOOD PRESSURE < 80 MM HG: ICD-10-PCS | Mod: S$GLB,,, | Performed by: NURSE PRACTITIONER

## 2021-07-21 PROCEDURE — 96372 THER/PROPH/DIAG INJ SC/IM: CPT | Mod: S$GLB,,, | Performed by: NURSE PRACTITIONER

## 2021-07-21 PROCEDURE — 99406 PR TOBACCO USE CESSATION INTERMEDIATE 3-10 MINUTES: ICD-10-PCS | Mod: S$GLB,,, | Performed by: NURSE PRACTITIONER

## 2021-07-21 PROCEDURE — 3074F SYST BP LT 130 MM HG: CPT | Mod: S$GLB,,, | Performed by: NURSE PRACTITIONER

## 2021-07-21 PROCEDURE — 3288F FALL RISK ASSESSMENT DOCD: CPT | Mod: S$GLB,,, | Performed by: NURSE PRACTITIONER

## 2021-07-21 PROCEDURE — 99406 BEHAV CHNG SMOKING 3-10 MIN: CPT | Mod: S$GLB,,, | Performed by: NURSE PRACTITIONER

## 2021-07-21 RX ORDER — HYDROCODONE BITARTRATE AND ACETAMINOPHEN 10; 325 MG/1; MG/1
1 TABLET ORAL EVERY 8 HOURS PRN
Qty: 90 TABLET | Refills: 0 | Status: SHIPPED | OUTPATIENT
Start: 2021-07-21 | End: 2021-09-17 | Stop reason: SDUPTHER

## 2021-07-21 RX ORDER — LISINOPRIL 10 MG/1
10 TABLET ORAL DAILY
Qty: 90 TABLET | Refills: 1 | Status: SHIPPED | OUTPATIENT
Start: 2021-07-21 | End: 2022-01-25 | Stop reason: SDUPTHER

## 2021-07-21 RX ORDER — DEXAMETHASONE SODIUM PHOSPHATE 4 MG/ML
4 INJECTION, SOLUTION INTRA-ARTICULAR; INTRALESIONAL; INTRAMUSCULAR; INTRAVENOUS; SOFT TISSUE ONCE
Status: COMPLETED | OUTPATIENT
Start: 2021-07-21 | End: 2021-07-21

## 2021-07-21 RX ORDER — FLUOXETINE HYDROCHLORIDE 40 MG/1
40 CAPSULE ORAL DAILY
Qty: 90 CAPSULE | Refills: 1 | Status: SHIPPED | OUTPATIENT
Start: 2021-07-21 | End: 2021-10-25 | Stop reason: SDUPTHER

## 2021-07-21 RX ADMIN — DEXAMETHASONE SODIUM PHOSPHATE 4 MG: 4 INJECTION, SOLUTION INTRA-ARTICULAR; INTRALESIONAL; INTRAMUSCULAR; INTRAVENOUS; SOFT TISSUE at 10:07

## 2021-08-19 DIAGNOSIS — M79.7 FIBROMYALGIA: ICD-10-CM

## 2021-08-19 DIAGNOSIS — M25.561 RIGHT KNEE PAIN, UNSPECIFIED CHRONICITY: ICD-10-CM

## 2021-08-19 RX ORDER — HYDROCODONE BITARTRATE AND ACETAMINOPHEN 10; 325 MG/1; MG/1
1 TABLET ORAL EVERY 8 HOURS PRN
Qty: 90 TABLET | Refills: 0 | Status: SHIPPED | OUTPATIENT
Start: 2021-08-19 | End: 2021-10-26 | Stop reason: SDUPTHER

## 2021-09-15 ENCOUNTER — TELEPHONE (OUTPATIENT)
Dept: FAMILY MEDICINE | Facility: CLINIC | Age: 66
End: 2021-09-15

## 2021-09-15 DIAGNOSIS — F17.210 NICOTINE DEPENDENCE, CIGARETTES, UNCOMPLICATED: ICD-10-CM

## 2021-09-15 DIAGNOSIS — Z12.2 ENCOUNTER FOR SCREENING FOR CANCER OF RESPIRATORY ORGANS: Primary | ICD-10-CM

## 2021-09-15 DIAGNOSIS — F17.210 CIGARETTE SMOKER: ICD-10-CM

## 2021-09-15 DIAGNOSIS — Z12.31 ENCOUNTER FOR SCREENING MAMMOGRAM FOR MALIGNANT NEOPLASM OF BREAST: Primary | ICD-10-CM

## 2021-09-17 DIAGNOSIS — M79.7 FIBROMYALGIA: ICD-10-CM

## 2021-09-17 DIAGNOSIS — M25.561 RIGHT KNEE PAIN, UNSPECIFIED CHRONICITY: ICD-10-CM

## 2021-09-17 RX ORDER — HYDROCODONE BITARTRATE AND ACETAMINOPHEN 10; 325 MG/1; MG/1
1 TABLET ORAL EVERY 8 HOURS PRN
Qty: 90 TABLET | Refills: 0 | Status: SHIPPED | OUTPATIENT
Start: 2021-09-17 | End: 2021-11-18 | Stop reason: SDUPTHER

## 2021-09-29 ENCOUNTER — HOSPITAL ENCOUNTER (OUTPATIENT)
Dept: RADIOLOGY | Facility: HOSPITAL | Age: 66
Discharge: HOME | End: 2021-09-29
Attending: NURSE PRACTITIONER
Payer: MEDICARE

## 2021-09-29 DIAGNOSIS — F17.210 CIGARETTE SMOKER: ICD-10-CM

## 2021-09-29 DIAGNOSIS — Z12.31 ENCOUNTER FOR SCREENING MAMMOGRAM FOR MALIGNANT NEOPLASM OF BREAST: ICD-10-CM

## 2021-09-29 DIAGNOSIS — F17.210 NICOTINE DEPENDENCE, CIGARETTES, UNCOMPLICATED: ICD-10-CM

## 2021-09-29 DIAGNOSIS — Z12.2 ENCOUNTER FOR SCREENING FOR CANCER OF RESPIRATORY ORGANS: ICD-10-CM

## 2021-09-29 PROCEDURE — 71271 CT THORAX LUNG CANCER SCR C-: CPT | Mod: TC,PO

## 2021-09-29 PROCEDURE — 77067 SCR MAMMO BI INCL CAD: CPT | Mod: TC,PO

## 2021-10-01 ENCOUNTER — PATIENT MESSAGE (OUTPATIENT)
Dept: FAMILY MEDICINE | Facility: CLINIC | Age: 66
End: 2021-10-01

## 2021-10-25 ENCOUNTER — OFFICE VISIT (OUTPATIENT)
Dept: FAMILY MEDICINE | Facility: CLINIC | Age: 66
End: 2021-10-25
Payer: MEDICARE

## 2021-10-25 ENCOUNTER — TELEPHONE (OUTPATIENT)
Dept: FAMILY MEDICINE | Facility: CLINIC | Age: 66
End: 2021-10-25
Payer: MEDICARE

## 2021-10-25 ENCOUNTER — PATIENT MESSAGE (OUTPATIENT)
Dept: FAMILY MEDICINE | Facility: CLINIC | Age: 66
End: 2021-10-25
Payer: MEDICARE

## 2021-10-25 VITALS
HEART RATE: 76 BPM | BODY MASS INDEX: 50.82 KG/M2 | DIASTOLIC BLOOD PRESSURE: 78 MMHG | WEIGHT: 286.81 LBS | SYSTOLIC BLOOD PRESSURE: 128 MMHG | HEIGHT: 63 IN

## 2021-10-25 DIAGNOSIS — I25.10 CORONARY ARTERY CALCIFICATION: ICD-10-CM

## 2021-10-25 DIAGNOSIS — E78.5 DYSLIPIDEMIA: ICD-10-CM

## 2021-10-25 DIAGNOSIS — E66.01 MORBID OBESITY DUE TO EXCESS CALORIES: ICD-10-CM

## 2021-10-25 DIAGNOSIS — F33.0 MILD EPISODE OF RECURRENT MAJOR DEPRESSIVE DISORDER: ICD-10-CM

## 2021-10-25 DIAGNOSIS — G89.29 CHRONIC PAIN OF RIGHT KNEE: ICD-10-CM

## 2021-10-25 DIAGNOSIS — J44.9 CHRONIC OBSTRUCTIVE PULMONARY DISEASE, UNSPECIFIED COPD TYPE: ICD-10-CM

## 2021-10-25 DIAGNOSIS — I10 ESSENTIAL HYPERTENSION: Primary | ICD-10-CM

## 2021-10-25 DIAGNOSIS — Z23 NEED FOR INFLUENZA VACCINATION: ICD-10-CM

## 2021-10-25 DIAGNOSIS — M25.561 CHRONIC PAIN OF RIGHT KNEE: ICD-10-CM

## 2021-10-25 DIAGNOSIS — I25.84 CORONARY ARTERY CALCIFICATION: ICD-10-CM

## 2021-10-25 DIAGNOSIS — Z12.11 COLON CANCER SCREENING: ICD-10-CM

## 2021-10-25 PROCEDURE — 3074F PR MOST RECENT SYSTOLIC BLOOD PRESSURE < 130 MM HG: ICD-10-PCS | Mod: S$GLB,,, | Performed by: NURSE PRACTITIONER

## 2021-10-25 PROCEDURE — 3078F PR MOST RECENT DIASTOLIC BLOOD PRESSURE < 80 MM HG: ICD-10-PCS | Mod: S$GLB,,, | Performed by: NURSE PRACTITIONER

## 2021-10-25 PROCEDURE — 1160F RVW MEDS BY RX/DR IN RCRD: CPT | Mod: S$GLB,,, | Performed by: NURSE PRACTITIONER

## 2021-10-25 PROCEDURE — 3008F PR BODY MASS INDEX (BMI) DOCUMENTED: ICD-10-PCS | Mod: S$GLB,,, | Performed by: NURSE PRACTITIONER

## 2021-10-25 PROCEDURE — 1160F PR REVIEW ALL MEDS BY PRESCRIBER/CLIN PHARMACIST DOCUMENTED: ICD-10-PCS | Mod: S$GLB,,, | Performed by: NURSE PRACTITIONER

## 2021-10-25 PROCEDURE — 3078F DIAST BP <80 MM HG: CPT | Mod: S$GLB,,, | Performed by: NURSE PRACTITIONER

## 2021-10-25 PROCEDURE — 99214 PR OFFICE/OUTPT VISIT, EST, LEVL IV, 30-39 MIN: ICD-10-PCS | Mod: 25,S$GLB,, | Performed by: NURSE PRACTITIONER

## 2021-10-25 PROCEDURE — 4010F PR ACE/ARB THEARPY RXD/TAKEN: ICD-10-PCS | Mod: S$GLB,,, | Performed by: NURSE PRACTITIONER

## 2021-10-25 PROCEDURE — 1159F MED LIST DOCD IN RCRD: CPT | Mod: S$GLB,,, | Performed by: NURSE PRACTITIONER

## 2021-10-25 PROCEDURE — G0008 FLU VACCINE - QUADRIVALENT - HIGH DOSE (65+) PRESERVATIVE FREE IM: ICD-10-PCS | Mod: S$GLB,,, | Performed by: NURSE PRACTITIONER

## 2021-10-25 PROCEDURE — G0008 ADMIN INFLUENZA VIRUS VAC: HCPCS | Mod: S$GLB,,, | Performed by: NURSE PRACTITIONER

## 2021-10-25 PROCEDURE — 3008F BODY MASS INDEX DOCD: CPT | Mod: S$GLB,,, | Performed by: NURSE PRACTITIONER

## 2021-10-25 PROCEDURE — 3061F PR NEG MICROALBUMINURIA RESULT DOCUMENTED/REVIEW: ICD-10-PCS | Mod: S$GLB,,, | Performed by: NURSE PRACTITIONER

## 2021-10-25 PROCEDURE — 99214 OFFICE O/P EST MOD 30 MIN: CPT | Mod: 25,S$GLB,, | Performed by: NURSE PRACTITIONER

## 2021-10-25 PROCEDURE — 4010F ACE/ARB THERAPY RXD/TAKEN: CPT | Mod: S$GLB,,, | Performed by: NURSE PRACTITIONER

## 2021-10-25 PROCEDURE — 3061F NEG MICROALBUMINURIA REV: CPT | Mod: S$GLB,,, | Performed by: NURSE PRACTITIONER

## 2021-10-25 PROCEDURE — 1159F PR MEDICATION LIST DOCUMENTED IN MEDICAL RECORD: ICD-10-PCS | Mod: S$GLB,,, | Performed by: NURSE PRACTITIONER

## 2021-10-25 PROCEDURE — 90662 IIV NO PRSV INCREASED AG IM: CPT | Mod: S$GLB,,, | Performed by: NURSE PRACTITIONER

## 2021-10-25 PROCEDURE — 3074F SYST BP LT 130 MM HG: CPT | Mod: S$GLB,,, | Performed by: NURSE PRACTITIONER

## 2021-10-25 PROCEDURE — 3066F PR DOCUMENTATION OF TREATMENT FOR NEPHROPATHY: ICD-10-PCS | Mod: S$GLB,,, | Performed by: NURSE PRACTITIONER

## 2021-10-25 PROCEDURE — 90662 FLU VACCINE - QUADRIVALENT - HIGH DOSE (65+) PRESERVATIVE FREE IM: ICD-10-PCS | Mod: S$GLB,,, | Performed by: NURSE PRACTITIONER

## 2021-10-25 PROCEDURE — 3066F NEPHROPATHY DOC TX: CPT | Mod: S$GLB,,, | Performed by: NURSE PRACTITIONER

## 2021-10-25 RX ORDER — GABAPENTIN 300 MG/1
300 CAPSULE ORAL 3 TIMES DAILY
Qty: 270 CAPSULE | Refills: 1 | Status: SHIPPED | OUTPATIENT
Start: 2021-10-25 | End: 2022-04-26 | Stop reason: SDUPTHER

## 2021-10-25 RX ORDER — FLUOXETINE HYDROCHLORIDE 40 MG/1
40 CAPSULE ORAL DAILY
Qty: 90 CAPSULE | Refills: 1 | Status: SHIPPED | OUTPATIENT
Start: 2021-10-25 | End: 2022-04-26 | Stop reason: SDUPTHER

## 2021-10-26 ENCOUNTER — PATIENT MESSAGE (OUTPATIENT)
Dept: FAMILY MEDICINE | Facility: CLINIC | Age: 66
End: 2021-10-26
Payer: MEDICARE

## 2021-10-26 DIAGNOSIS — M25.561 RIGHT KNEE PAIN, UNSPECIFIED CHRONICITY: ICD-10-CM

## 2021-10-26 DIAGNOSIS — M79.7 FIBROMYALGIA: ICD-10-CM

## 2021-10-26 RX ORDER — HYDROCODONE BITARTRATE AND ACETAMINOPHEN 10; 325 MG/1; MG/1
1 TABLET ORAL EVERY 8 HOURS PRN
Qty: 90 TABLET | Refills: 0 | Status: SHIPPED | OUTPATIENT
Start: 2021-10-26 | End: 2021-12-22 | Stop reason: SDUPTHER

## 2021-11-18 DIAGNOSIS — M25.561 RIGHT KNEE PAIN, UNSPECIFIED CHRONICITY: ICD-10-CM

## 2021-11-18 DIAGNOSIS — M79.7 FIBROMYALGIA: ICD-10-CM

## 2021-11-18 RX ORDER — HYDROCODONE BITARTRATE AND ACETAMINOPHEN 10; 325 MG/1; MG/1
1 TABLET ORAL EVERY 8 HOURS PRN
Qty: 90 TABLET | Refills: 0 | Status: SHIPPED | OUTPATIENT
Start: 2021-11-26 | End: 2022-04-26 | Stop reason: SDUPTHER

## 2021-11-24 ENCOUNTER — TELEPHONE (OUTPATIENT)
Dept: FAMILY MEDICINE | Facility: CLINIC | Age: 66
End: 2021-11-24
Payer: MEDICARE

## 2021-12-06 DIAGNOSIS — F33.0 MILD EPISODE OF RECURRENT MAJOR DEPRESSIVE DISORDER: ICD-10-CM

## 2021-12-06 DIAGNOSIS — F17.210 CIGARETTE SMOKER: ICD-10-CM

## 2021-12-06 DIAGNOSIS — E78.5 DYSLIPIDEMIA: ICD-10-CM

## 2021-12-06 RX ORDER — BUPROPION HYDROCHLORIDE 150 MG/1
150 TABLET, EXTENDED RELEASE ORAL 2 TIMES DAILY
Qty: 180 TABLET | Refills: 1 | Status: SHIPPED | OUTPATIENT
Start: 2021-12-06 | End: 2022-04-26 | Stop reason: SDUPTHER

## 2021-12-06 RX ORDER — ATORVASTATIN CALCIUM 10 MG/1
10 TABLET, FILM COATED ORAL NIGHTLY
Qty: 90 TABLET | Refills: 1 | Status: SHIPPED | OUTPATIENT
Start: 2021-12-06 | End: 2022-04-26 | Stop reason: SDUPTHER

## 2021-12-13 ENCOUNTER — TELEPHONE (OUTPATIENT)
Dept: FAMILY MEDICINE | Facility: CLINIC | Age: 66
End: 2021-12-13
Payer: MEDICARE

## 2021-12-22 DIAGNOSIS — M79.7 FIBROMYALGIA: ICD-10-CM

## 2021-12-22 DIAGNOSIS — M25.561 RIGHT KNEE PAIN, UNSPECIFIED CHRONICITY: ICD-10-CM

## 2021-12-22 RX ORDER — HYDROCODONE BITARTRATE AND ACETAMINOPHEN 10; 325 MG/1; MG/1
1 TABLET ORAL EVERY 8 HOURS PRN
Qty: 90 TABLET | Refills: 0 | Status: SHIPPED | OUTPATIENT
Start: 2021-12-22 | End: 2022-01-25 | Stop reason: SDUPTHER

## 2022-01-25 ENCOUNTER — OFFICE VISIT (OUTPATIENT)
Dept: FAMILY MEDICINE | Facility: CLINIC | Age: 67
End: 2022-01-25
Payer: MEDICARE

## 2022-01-25 VITALS
OXYGEN SATURATION: 98 % | HEART RATE: 80 BPM | BODY MASS INDEX: 50.5 KG/M2 | SYSTOLIC BLOOD PRESSURE: 128 MMHG | WEIGHT: 285 LBS | DIASTOLIC BLOOD PRESSURE: 76 MMHG | HEIGHT: 63 IN

## 2022-01-25 DIAGNOSIS — E66.01 MORBID OBESITY DUE TO EXCESS CALORIES: ICD-10-CM

## 2022-01-25 DIAGNOSIS — E78.5 DYSLIPIDEMIA: ICD-10-CM

## 2022-01-25 DIAGNOSIS — M79.7 FIBROMYALGIA: ICD-10-CM

## 2022-01-25 DIAGNOSIS — F51.01 PRIMARY INSOMNIA: ICD-10-CM

## 2022-01-25 DIAGNOSIS — N18.31 STAGE 3A CHRONIC KIDNEY DISEASE: ICD-10-CM

## 2022-01-25 DIAGNOSIS — Z79.899 ENCOUNTER FOR LONG-TERM (CURRENT) USE OF OTHER MEDICATIONS: ICD-10-CM

## 2022-01-25 DIAGNOSIS — M25.561 RIGHT KNEE PAIN, UNSPECIFIED CHRONICITY: ICD-10-CM

## 2022-01-25 DIAGNOSIS — Z51.81 ENCOUNTER FOR THERAPEUTIC DRUG MONITORING: ICD-10-CM

## 2022-01-25 DIAGNOSIS — F17.210 CIGARETTE SMOKER: ICD-10-CM

## 2022-01-25 DIAGNOSIS — M25.561 CHRONIC PAIN OF RIGHT KNEE: ICD-10-CM

## 2022-01-25 DIAGNOSIS — F33.0 MILD EPISODE OF RECURRENT MAJOR DEPRESSIVE DISORDER: ICD-10-CM

## 2022-01-25 DIAGNOSIS — J44.9 CHRONIC OBSTRUCTIVE PULMONARY DISEASE, UNSPECIFIED COPD TYPE: ICD-10-CM

## 2022-01-25 DIAGNOSIS — G89.29 CHRONIC PAIN OF RIGHT KNEE: ICD-10-CM

## 2022-01-25 DIAGNOSIS — I10 ESSENTIAL HYPERTENSION: Primary | ICD-10-CM

## 2022-01-25 PROCEDURE — 3078F DIAST BP <80 MM HG: CPT | Mod: S$GLB,,, | Performed by: NURSE PRACTITIONER

## 2022-01-25 PROCEDURE — 99214 OFFICE O/P EST MOD 30 MIN: CPT | Mod: S$GLB,,, | Performed by: NURSE PRACTITIONER

## 2022-01-25 PROCEDURE — 3074F SYST BP LT 130 MM HG: CPT | Mod: S$GLB,,, | Performed by: NURSE PRACTITIONER

## 2022-01-25 PROCEDURE — 3074F PR MOST RECENT SYSTOLIC BLOOD PRESSURE < 130 MM HG: ICD-10-PCS | Mod: S$GLB,,, | Performed by: NURSE PRACTITIONER

## 2022-01-25 PROCEDURE — 3288F FALL RISK ASSESSMENT DOCD: CPT | Mod: S$GLB,,, | Performed by: NURSE PRACTITIONER

## 2022-01-25 PROCEDURE — 1101F PR PT FALLS ASSESS DOC 0-1 FALLS W/OUT INJ PAST YR: ICD-10-PCS | Mod: S$GLB,,, | Performed by: NURSE PRACTITIONER

## 2022-01-25 PROCEDURE — 3288F PR FALLS RISK ASSESSMENT DOCUMENTED: ICD-10-PCS | Mod: S$GLB,,, | Performed by: NURSE PRACTITIONER

## 2022-01-25 PROCEDURE — 3008F PR BODY MASS INDEX (BMI) DOCUMENTED: ICD-10-PCS | Mod: S$GLB,,, | Performed by: NURSE PRACTITIONER

## 2022-01-25 PROCEDURE — 1160F PR REVIEW ALL MEDS BY PRESCRIBER/CLIN PHARMACIST DOCUMENTED: ICD-10-PCS | Mod: S$GLB,,, | Performed by: NURSE PRACTITIONER

## 2022-01-25 PROCEDURE — 4010F ACE/ARB THERAPY RXD/TAKEN: CPT | Mod: S$GLB,,, | Performed by: NURSE PRACTITIONER

## 2022-01-25 PROCEDURE — 3008F BODY MASS INDEX DOCD: CPT | Mod: S$GLB,,, | Performed by: NURSE PRACTITIONER

## 2022-01-25 PROCEDURE — 1160F RVW MEDS BY RX/DR IN RCRD: CPT | Mod: S$GLB,,, | Performed by: NURSE PRACTITIONER

## 2022-01-25 PROCEDURE — 99214 PR OFFICE/OUTPT VISIT, EST, LEVL IV, 30-39 MIN: ICD-10-PCS | Mod: S$GLB,,, | Performed by: NURSE PRACTITIONER

## 2022-01-25 PROCEDURE — 1101F PT FALLS ASSESS-DOCD LE1/YR: CPT | Mod: S$GLB,,, | Performed by: NURSE PRACTITIONER

## 2022-01-25 PROCEDURE — 3078F PR MOST RECENT DIASTOLIC BLOOD PRESSURE < 80 MM HG: ICD-10-PCS | Mod: S$GLB,,, | Performed by: NURSE PRACTITIONER

## 2022-01-25 PROCEDURE — 1159F PR MEDICATION LIST DOCUMENTED IN MEDICAL RECORD: ICD-10-PCS | Mod: S$GLB,,, | Performed by: NURSE PRACTITIONER

## 2022-01-25 PROCEDURE — 4010F PR ACE/ARB THEARPY RXD/TAKEN: ICD-10-PCS | Mod: S$GLB,,, | Performed by: NURSE PRACTITIONER

## 2022-01-25 PROCEDURE — 1159F MED LIST DOCD IN RCRD: CPT | Mod: S$GLB,,, | Performed by: NURSE PRACTITIONER

## 2022-01-25 RX ORDER — TRAZODONE HYDROCHLORIDE 50 MG/1
50 TABLET ORAL NIGHTLY PRN
Qty: 90 TABLET | Refills: 1 | Status: SHIPPED | OUTPATIENT
Start: 2022-01-25 | End: 2022-04-26 | Stop reason: SDUPTHER

## 2022-01-25 RX ORDER — HYDROCODONE BITARTRATE AND ACETAMINOPHEN 10; 325 MG/1; MG/1
1 TABLET ORAL EVERY 8 HOURS PRN
Qty: 90 TABLET | Refills: 0 | Status: SHIPPED | OUTPATIENT
Start: 2022-03-24 | End: 2022-08-04 | Stop reason: SDUPTHER

## 2022-01-25 RX ORDER — UMECLIDINIUM BROMIDE AND VILANTEROL TRIFENATATE 62.5; 25 UG/1; UG/1
1 POWDER RESPIRATORY (INHALATION) DAILY
Qty: 60 EACH | Refills: 5 | Status: SHIPPED | OUTPATIENT
Start: 2022-01-25 | End: 2022-11-08 | Stop reason: SDUPTHER

## 2022-01-25 RX ORDER — LISINOPRIL 10 MG/1
10 TABLET ORAL DAILY
Qty: 90 TABLET | Refills: 1 | Status: SHIPPED | OUTPATIENT
Start: 2022-01-25 | End: 2022-07-07 | Stop reason: SDUPTHER

## 2022-01-25 RX ORDER — HYDROCODONE BITARTRATE AND ACETAMINOPHEN 10; 325 MG/1; MG/1
1 TABLET ORAL EVERY 8 HOURS PRN
Qty: 90 TABLET | Refills: 0 | Status: SHIPPED | OUTPATIENT
Start: 2022-02-24 | End: 2022-11-09 | Stop reason: SDUPTHER

## 2022-01-25 RX ORDER — HYDROCODONE BITARTRATE AND ACETAMINOPHEN 10; 325 MG/1; MG/1
1 TABLET ORAL EVERY 8 HOURS PRN
Qty: 90 TABLET | Refills: 0 | Status: SHIPPED | OUTPATIENT
Start: 2022-01-25 | End: 2022-08-04 | Stop reason: SDUPTHER
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
## 2022-01-28 LAB
ALBUMIN/CREAT UR: 8 MCG/MG CREAT
AMPHETAMINES UR QL: NEGATIVE NG/ML
APPEARANCE UR: CLEAR
BACTERIA #/AREA URNS HPF: NORMAL /HPF
BACTERIA UR CULT: NORMAL
BARBITURATES UR QL: NEGATIVE NG/ML
BENZODIAZ UR QL: NEGATIVE NG/ML
BILIRUB UR QL STRIP: NEGATIVE
BZE UR QL: NEGATIVE NG/ML
CODEINE UR-MCNC: NEGATIVE NG/ML
COLOR UR: YELLOW
CREAT UR-MCNC: 24.4 MG/DL
CREAT UR-MCNC: 25 MG/DL (ref 20–275)
DRUG SCREEN COMMENT UR-IMP: ABNORMAL
GLUCOSE UR QL STRIP: NEGATIVE
HGB UR QL STRIP: NEGATIVE
HYALINE CASTS #/AREA URNS LPF: NORMAL /LPF
HYDROCODONE UR-MCNC: 90 NG/ML
HYDROMORPHONE UR-MCNC: NEGATIVE NG/ML
KETONES UR QL STRIP: NEGATIVE
LEUKOCYTE ESTERASE UR QL STRIP: NEGATIVE
METHADONE UR QL: NEGATIVE NG/ML
MICROALBUMIN UR-MCNC: 0.2 MG/DL
MORPHINE UR-MCNC: NEGATIVE NG/ML
NITRITE UR QL STRIP: NEGATIVE
NORHYDROCODONE UR CFM-MCNC: 77 NG/ML
NOTES AND COMMENTS: ABNORMAL
OPIATES UR QL: POSITIVE NG/ML
OXIDANTS UR QL: NEGATIVE MCG/ML
OXYCODONE UR QL: NEGATIVE NG/ML
PCP UR QL: NEGATIVE NG/ML
PH UR STRIP: 6.5 [PH] (ref 5–8)
PH UR: 6.4 [PH] (ref 4.5–9)
PROT UR QL STRIP: NEGATIVE
RBC #/AREA URNS HPF: NORMAL /HPF
SP GR UR STRIP: 1.01 (ref 1–1.03)
SQUAMOUS #/AREA URNS HPF: NORMAL /HPF
WBC #/AREA URNS HPF: NORMAL /HPF

## 2022-04-12 ENCOUNTER — TELEPHONE (OUTPATIENT)
Dept: FAMILY MEDICINE | Facility: CLINIC | Age: 67
End: 2022-04-12

## 2022-04-26 ENCOUNTER — OFFICE VISIT (OUTPATIENT)
Dept: FAMILY MEDICINE | Facility: CLINIC | Age: 67
End: 2022-04-26
Payer: MEDICARE

## 2022-04-26 VITALS
HEIGHT: 63 IN | SYSTOLIC BLOOD PRESSURE: 122 MMHG | WEIGHT: 273.19 LBS | BODY MASS INDEX: 48.41 KG/M2 | DIASTOLIC BLOOD PRESSURE: 76 MMHG | OXYGEN SATURATION: 97 % | HEART RATE: 74 BPM

## 2022-04-26 DIAGNOSIS — F51.01 PRIMARY INSOMNIA: ICD-10-CM

## 2022-04-26 DIAGNOSIS — M25.561 RIGHT KNEE PAIN, UNSPECIFIED CHRONICITY: ICD-10-CM

## 2022-04-26 DIAGNOSIS — G89.29 CHRONIC PAIN OF RIGHT KNEE: ICD-10-CM

## 2022-04-26 DIAGNOSIS — F33.0 MILD EPISODE OF RECURRENT MAJOR DEPRESSIVE DISORDER: ICD-10-CM

## 2022-04-26 DIAGNOSIS — J44.9 CHRONIC OBSTRUCTIVE PULMONARY DISEASE, UNSPECIFIED COPD TYPE: ICD-10-CM

## 2022-04-26 DIAGNOSIS — M79.7 FIBROMYALGIA: ICD-10-CM

## 2022-04-26 DIAGNOSIS — N18.31 STAGE 3A CHRONIC KIDNEY DISEASE: ICD-10-CM

## 2022-04-26 DIAGNOSIS — M25.561 CHRONIC PAIN OF RIGHT KNEE: ICD-10-CM

## 2022-04-26 DIAGNOSIS — F17.210 CIGARETTE SMOKER: ICD-10-CM

## 2022-04-26 DIAGNOSIS — I10 ESSENTIAL HYPERTENSION: Primary | ICD-10-CM

## 2022-04-26 DIAGNOSIS — E78.5 DYSLIPIDEMIA: ICD-10-CM

## 2022-04-26 LAB
ALBUMIN SERPL-MCNC: 4 G/DL (ref 3.6–5.1)
ALBUMIN/GLOB SERPL: 1.7 (CALC) (ref 1–2.5)
ALP SERPL-CCNC: 85 U/L (ref 37–153)
ALT SERPL-CCNC: 7 U/L (ref 6–29)
AST SERPL-CCNC: 10 U/L (ref 10–35)
BASOPHILS # BLD AUTO: 39 CELLS/UL (ref 0–200)
BASOPHILS NFR BLD AUTO: 0.7 %
BILIRUB SERPL-MCNC: 0.5 MG/DL (ref 0.2–1.2)
BUN SERPL-MCNC: 17 MG/DL (ref 7–25)
BUN/CREAT SERPL: 14 (CALC) (ref 6–22)
CALCIUM SERPL-MCNC: 9.3 MG/DL (ref 8.6–10.4)
CHLORIDE SERPL-SCNC: 103 MMOL/L (ref 98–110)
CHOLEST SERPL-MCNC: 151 MG/DL
CHOLEST/HDLC SERPL: 3 (CALC)
CO2 SERPL-SCNC: 29 MMOL/L (ref 20–32)
CREAT SERPL-MCNC: 1.23 MG/DL (ref 0.5–0.99)
EOSINOPHIL # BLD AUTO: 202 CELLS/UL (ref 15–500)
EOSINOPHIL NFR BLD AUTO: 3.6 %
ERYTHROCYTE [DISTWIDTH] IN BLOOD BY AUTOMATED COUNT: 13.8 % (ref 11–15)
GLOBULIN SER CALC-MCNC: 2.3 G/DL (CALC) (ref 1.9–3.7)
GLUCOSE SERPL-MCNC: 76 MG/DL (ref 65–99)
HCT VFR BLD AUTO: 39.7 % (ref 35–45)
HDLC SERPL-MCNC: 50 MG/DL
HGB BLD-MCNC: 12.7 G/DL (ref 11.7–15.5)
LDLC SERPL CALC-MCNC: 81 MG/DL (CALC)
LYMPHOCYTES # BLD AUTO: 974 CELLS/UL (ref 850–3900)
LYMPHOCYTES NFR BLD AUTO: 17.4 %
MCH RBC QN AUTO: 30.5 PG (ref 27–33)
MCHC RBC AUTO-ENTMCNC: 32 G/DL (ref 32–36)
MCV RBC AUTO: 95.4 FL (ref 80–100)
MONOCYTES # BLD AUTO: 398 CELLS/UL (ref 200–950)
MONOCYTES NFR BLD AUTO: 7.1 %
NEUTROPHILS # BLD AUTO: 3987 CELLS/UL (ref 1500–7800)
NEUTROPHILS NFR BLD AUTO: 71.2 %
NONHDLC SERPL-MCNC: 101 MG/DL (CALC)
PLATELET # BLD AUTO: 297 THOUSAND/UL (ref 140–400)
PMV BLD REES-ECKER: 9.6 FL (ref 7.5–12.5)
POTASSIUM SERPL-SCNC: 4 MMOL/L (ref 3.5–5.3)
PROT SERPL-MCNC: 6.3 G/DL (ref 6.1–8.1)
RBC # BLD AUTO: 4.16 MILLION/UL (ref 3.8–5.1)
SODIUM SERPL-SCNC: 138 MMOL/L (ref 135–146)
TRIGL SERPL-MCNC: 107 MG/DL
TSH SERPL-ACNC: 1.49 MIU/L (ref 0.4–4.5)
WBC # BLD AUTO: 5.6 THOUSAND/UL (ref 3.8–10.8)

## 2022-04-26 PROCEDURE — 3288F FALL RISK ASSESSMENT DOCD: CPT | Mod: S$GLB,,, | Performed by: NURSE PRACTITIONER

## 2022-04-26 PROCEDURE — 4010F ACE/ARB THERAPY RXD/TAKEN: CPT | Mod: S$GLB,,, | Performed by: NURSE PRACTITIONER

## 2022-04-26 PROCEDURE — 1160F PR REVIEW ALL MEDS BY PRESCRIBER/CLIN PHARMACIST DOCUMENTED: ICD-10-PCS | Mod: S$GLB,,, | Performed by: NURSE PRACTITIONER

## 2022-04-26 PROCEDURE — 3066F PR DOCUMENTATION OF TREATMENT FOR NEPHROPATHY: ICD-10-PCS | Mod: S$GLB,,, | Performed by: NURSE PRACTITIONER

## 2022-04-26 PROCEDURE — 3008F PR BODY MASS INDEX (BMI) DOCUMENTED: ICD-10-PCS | Mod: S$GLB,,, | Performed by: NURSE PRACTITIONER

## 2022-04-26 PROCEDURE — 3288F PR FALLS RISK ASSESSMENT DOCUMENTED: ICD-10-PCS | Mod: S$GLB,,, | Performed by: NURSE PRACTITIONER

## 2022-04-26 PROCEDURE — 99214 OFFICE O/P EST MOD 30 MIN: CPT | Mod: 25,S$GLB,, | Performed by: NURSE PRACTITIONER

## 2022-04-26 PROCEDURE — 1159F PR MEDICATION LIST DOCUMENTED IN MEDICAL RECORD: ICD-10-PCS | Mod: S$GLB,,, | Performed by: NURSE PRACTITIONER

## 2022-04-26 PROCEDURE — 3074F PR MOST RECENT SYSTOLIC BLOOD PRESSURE < 130 MM HG: ICD-10-PCS | Mod: S$GLB,,, | Performed by: NURSE PRACTITIONER

## 2022-04-26 PROCEDURE — 99406 PR TOBACCO USE CESSATION INTERMEDIATE 3-10 MINUTES: ICD-10-PCS | Mod: S$GLB,,, | Performed by: NURSE PRACTITIONER

## 2022-04-26 PROCEDURE — 1101F PT FALLS ASSESS-DOCD LE1/YR: CPT | Mod: S$GLB,,, | Performed by: NURSE PRACTITIONER

## 2022-04-26 PROCEDURE — 3061F NEG MICROALBUMINURIA REV: CPT | Mod: S$GLB,,, | Performed by: NURSE PRACTITIONER

## 2022-04-26 PROCEDURE — 3066F NEPHROPATHY DOC TX: CPT | Mod: S$GLB,,, | Performed by: NURSE PRACTITIONER

## 2022-04-26 PROCEDURE — 3078F DIAST BP <80 MM HG: CPT | Mod: S$GLB,,, | Performed by: NURSE PRACTITIONER

## 2022-04-26 PROCEDURE — 3078F PR MOST RECENT DIASTOLIC BLOOD PRESSURE < 80 MM HG: ICD-10-PCS | Mod: S$GLB,,, | Performed by: NURSE PRACTITIONER

## 2022-04-26 PROCEDURE — 3008F BODY MASS INDEX DOCD: CPT | Mod: S$GLB,,, | Performed by: NURSE PRACTITIONER

## 2022-04-26 PROCEDURE — 3074F SYST BP LT 130 MM HG: CPT | Mod: S$GLB,,, | Performed by: NURSE PRACTITIONER

## 2022-04-26 PROCEDURE — 1101F PR PT FALLS ASSESS DOC 0-1 FALLS W/OUT INJ PAST YR: ICD-10-PCS | Mod: S$GLB,,, | Performed by: NURSE PRACTITIONER

## 2022-04-26 PROCEDURE — 3061F PR NEG MICROALBUMINURIA RESULT DOCUMENTED/REVIEW: ICD-10-PCS | Mod: S$GLB,,, | Performed by: NURSE PRACTITIONER

## 2022-04-26 PROCEDURE — 99406 BEHAV CHNG SMOKING 3-10 MIN: CPT | Mod: S$GLB,,, | Performed by: NURSE PRACTITIONER

## 2022-04-26 PROCEDURE — 99214 PR OFFICE/OUTPT VISIT, EST, LEVL IV, 30-39 MIN: ICD-10-PCS | Mod: 25,S$GLB,, | Performed by: NURSE PRACTITIONER

## 2022-04-26 PROCEDURE — 4010F PR ACE/ARB THEARPY RXD/TAKEN: ICD-10-PCS | Mod: S$GLB,,, | Performed by: NURSE PRACTITIONER

## 2022-04-26 PROCEDURE — 1159F MED LIST DOCD IN RCRD: CPT | Mod: S$GLB,,, | Performed by: NURSE PRACTITIONER

## 2022-04-26 PROCEDURE — 1160F RVW MEDS BY RX/DR IN RCRD: CPT | Mod: S$GLB,,, | Performed by: NURSE PRACTITIONER

## 2022-04-26 RX ORDER — BUPROPION HYDROCHLORIDE 150 MG/1
150 TABLET, EXTENDED RELEASE ORAL 2 TIMES DAILY
Qty: 180 TABLET | Refills: 1 | Status: SHIPPED | OUTPATIENT
Start: 2022-04-26 | End: 2022-11-08 | Stop reason: SDUPTHER

## 2022-04-26 RX ORDER — GABAPENTIN 300 MG/1
300 CAPSULE ORAL 3 TIMES DAILY
Qty: 270 CAPSULE | Refills: 1 | Status: SHIPPED | OUTPATIENT
Start: 2022-04-26 | End: 2023-08-09 | Stop reason: SDUPTHER

## 2022-04-26 RX ORDER — FLUOXETINE HYDROCHLORIDE 40 MG/1
40 CAPSULE ORAL DAILY
Qty: 90 CAPSULE | Refills: 1 | Status: SHIPPED | OUTPATIENT
Start: 2022-04-26 | End: 2022-11-04 | Stop reason: SDUPTHER

## 2022-04-26 RX ORDER — HYDROCODONE BITARTRATE AND ACETAMINOPHEN 10; 325 MG/1; MG/1
1 TABLET ORAL EVERY 8 HOURS PRN
Qty: 90 TABLET | Refills: 0 | Status: SHIPPED | OUTPATIENT
Start: 2022-05-26 | End: 2022-12-07 | Stop reason: SDUPTHER

## 2022-04-26 RX ORDER — ATORVASTATIN CALCIUM 10 MG/1
10 TABLET, FILM COATED ORAL NIGHTLY
Qty: 90 TABLET | Refills: 1 | Status: SHIPPED | OUTPATIENT
Start: 2022-04-26 | End: 2022-11-08 | Stop reason: SDUPTHER

## 2022-04-26 RX ORDER — HYDROCODONE BITARTRATE AND ACETAMINOPHEN 10; 325 MG/1; MG/1
1 TABLET ORAL EVERY 8 HOURS PRN
Qty: 90 TABLET | Refills: 0 | Status: SHIPPED | OUTPATIENT
Start: 2022-04-26 | End: 2022-08-04 | Stop reason: SDUPTHER

## 2022-04-26 RX ORDER — TRAZODONE HYDROCHLORIDE 50 MG/1
50 TABLET ORAL NIGHTLY PRN
Qty: 90 TABLET | Refills: 1 | Status: SHIPPED | OUTPATIENT
Start: 2022-04-26 | End: 2022-08-04 | Stop reason: SDUPTHER

## 2022-04-26 RX ORDER — HYDROCODONE BITARTRATE AND ACETAMINOPHEN 10; 325 MG/1; MG/1
1 TABLET ORAL EVERY 8 HOURS PRN
Qty: 90 TABLET | Refills: 0 | Status: SHIPPED | OUTPATIENT
Start: 2022-06-25 | End: 2022-12-22

## 2022-04-26 NOTE — PROGRESS NOTES
SUBJECTIVE:    Patient ID: Iris Mueller is a 66 y.o. female.    Chief Complaint: Follow-up (No bottles/ vaccines denied//dp)    Pt here for regular f/u- HTN/COPD/OA  Reports overall has been doing well.   has had serious medical issues with prolonged hospital stay- underwent emergent CABG but now doing better and back home.  Reports with stress was smoking more but now back to smoking 4-6 cigs/day  Reports has been trying to eat better, has cut out white carbs and weight is down 12lbs since Jan visit  Continues with chronic right knee pain, reports left knee pain seems to be getting worse also. Ambulates with cane, no recent falls                Office Visit on 01/25/2022   Component Date Value Ref Range Status    WBC 04/25/2022 5.6  3.8 - 10.8 Thousand/uL Final    RBC 04/25/2022 4.16  3.80 - 5.10 Million/uL Final    Hemoglobin 04/25/2022 12.7  11.7 - 15.5 g/dL Final    Hematocrit 04/25/2022 39.7  35.0 - 45.0 % Final    MCV 04/25/2022 95.4  80.0 - 100.0 fL Final    MCH 04/25/2022 30.5  27.0 - 33.0 pg Final    MCHC 04/25/2022 32.0  32.0 - 36.0 g/dL Final    RDW 04/25/2022 13.8  11.0 - 15.0 % Final    Platelets 04/25/2022 297  140 - 400 Thousand/uL Final    MPV 04/25/2022 9.6  7.5 - 12.5 fL Final    Neutrophils, Abs 04/25/2022 3,987  1,500 - 7,800 cells/uL Final    Lymph # 04/25/2022 974  850 - 3,900 cells/uL Final    Mono # 04/25/2022 398  200 - 950 cells/uL Final    Eos # 04/25/2022 202  15 - 500 cells/uL Final    Baso # 04/25/2022 39  0 - 200 cells/uL Final    Neutrophils Relative 04/25/2022 71.2  % Final    Lymph % 04/25/2022 17.4  % Final    Mono % 04/25/2022 7.1  % Final    Eosinophil % 04/25/2022 3.6  % Final    Basophil % 04/25/2022 0.7  % Final    Glucose 04/25/2022 76  65 - 99 mg/dL Final    BUN 04/25/2022 17  7 - 25 mg/dL Final    Creatinine 04/25/2022 1.23 (A) 0.50 - 0.99 mg/dL Final    eGFR if non African American 04/25/2022 45 (A) > OR = 60 mL/min/1.73m2 Final     eGFR if  04/25/2022 52 (A) > OR = 60 mL/min/1.73m2 Final    BUN/Creatinine Ratio 04/25/2022 14  6 - 22 (calc) Final    Sodium 04/25/2022 138  135 - 146 mmol/L Final    Potassium 04/25/2022 4.0  3.5 - 5.3 mmol/L Final    Chloride 04/25/2022 103  98 - 110 mmol/L Final    CO2 04/25/2022 29  20 - 32 mmol/L Final    Calcium 04/25/2022 9.3  8.6 - 10.4 mg/dL Final    Total Protein 04/25/2022 6.3  6.1 - 8.1 g/dL Final    Albumin 04/25/2022 4.0  3.6 - 5.1 g/dL Final    Globulin, Total 04/25/2022 2.3  1.9 - 3.7 g/dL (calc) Final    Albumin/Globulin Ratio 04/25/2022 1.7  1.0 - 2.5 (calc) Final    Total Bilirubin 04/25/2022 0.5  0.2 - 1.2 mg/dL Final    Alkaline Phosphatase 04/25/2022 85  37 - 153 U/L Final    AST 04/25/2022 10  10 - 35 U/L Final    ALT 04/25/2022 7  6 - 29 U/L Final    Cholesterol 04/25/2022 151  <200 mg/dL Final    HDL 04/25/2022 50  > OR = 50 mg/dL Final    Triglycerides 04/25/2022 107  <150 mg/dL Final    LDL Cholesterol 04/25/2022 81  mg/dL (calc) Final    HDL/Cholesterol Ratio 04/25/2022 3.0  <5.0 (calc) Final    Non HDL Chol. (LDL+VLDL) 04/25/2022 101  <130 mg/dL (calc) Final    Creatinine, Urine 01/25/2022 25  20 - 275 mg/dL Final    Microalb, Ur 01/25/2022 0.2  See Note: mg/dL Final    Microalb/Creat Ratio 01/25/2022 8  <30 mcg/mg creat Final    Color, UA 01/25/2022 YELLOW  YELLOW Final    Appearance, UA 01/25/2022 CLEAR  CLEAR Final    Specific Gravity, UA 01/25/2022 1.007  1.001 - 1.035 Final    pH, UA 01/25/2022 6.5  5.0 - 8.0 Final    Glucose, UA 01/25/2022 NEGATIVE  NEGATIVE Final    Bilirubin, UA 01/25/2022 NEGATIVE  NEGATIVE Final    Ketones, UA 01/25/2022 NEGATIVE  NEGATIVE Final    Occult Blood UA 01/25/2022 NEGATIVE  NEGATIVE Final    Protein, UA 01/25/2022 NEGATIVE  NEGATIVE Final    Nitrite, UA 01/25/2022 NEGATIVE  NEGATIVE Final    Leukocytes, UA 01/25/2022 NEGATIVE  NEGATIVE Final    WBC Casts, UA 01/25/2022 NONE SEEN  < OR = 5 /HPF  Final    RBC Casts, UA 2022 NONE SEEN  < OR = 2 /HPF Final    Squam Epithel, UA 2022 NONE SEEN  < OR = 5 /HPF Final    Bacteria, UA 2022 NONE SEEN  NONE SEEN /HPF Final    Hyaline Casts, UA 2022 NONE SEEN  NONE SEEN /LPF Final    Reflexive Urine Culture 2022    Final    TSH w/reflex to FT4 2022 1.49  0.40 - 4.50 mIU/L Final    Amphetamines 2022 NEGATIVE  <500 ng/mL Final    Barbiturates 2022 NEGATIVE  <300 ng/mL Final    Benzodiazepines 2022 NEGATIVE  <100 ng/mL Final    Cocaine Metabolites 2022 NEGATIVE  <150 ng/mL Final    Methadone 2022 NEGATIVE  <100 ng/mL Final    Opiates 2022 POSITIVE (A) <100 ng/mL Final    Codeine 2022 NEGATIVE  <50 ng/mL Final    Hydrocodone 2022 90 (A) <50 ng/mL Final    Hydromorphone 2022 NEGATIVE  <50 ng/mL Final    Morphine 2022 NEGATIVE  <50 ng/mL Final    NORHYDROCODONE 2022 77 (A) <50 ng/mL Final    Opiates Comments 2022    Final    Oxycodone 2022 NEGATIVE  <100 ng/mL Final    Phencyclidine 2022 NEGATIVE  <25 ng/mL Final    Creatinine 2022 24.4  > or = 20.0 mg/dL Final    pH 2022 6.4  4.5 - 9.0 Final    Oxidants, Urine (Tox) 2022 NEGATIVE  <200 mcg/mL Final    Notes and Comments 2022    Final       Past Medical History:   Diagnosis Date    Arthritis     Asthma     Encounter for blood transfusion     Hypertension     Wound infection 2019    Right knee     Past Surgical History:   Procedure Laterality Date     SECTION      JOINT REPLACEMENT      Knee    KNEE ARTHROPLASTY Right 2019    Procedure: ARTHROPLASTY, KNEE;  Surgeon: Delio Chung MD;  Location: [facility];  Service: Orthopedics;  Laterality: Right;  anesthesia:  general and block    REPLACEMENT OF WOUND VACUUM-ASSISTED CLOSURE DEVICE Right 2019    Procedure: REPLACEMENT, WOUND VAC;  Surgeon: Delio Chung MD;  Location: [facility]  OR;  Service: Orthopedics;  Laterality: Right;    TONSILLECTOMY       Family History   Problem Relation Age of Onset    Alzheimer's disease Mother        The 10-year CVD risk score (SUSU'Agostino, et al., 2008) is: 13.2%    Values used to calculate the score:      Age: 66 years      Sex: Female      Diabetic: No      Tobacco smoker: Yes      Systolic Blood Pressure: 122 mmHg      Is BP treated: Yes      HDL Cholesterol: 50 mg/dL      Total Cholesterol: 151 mg/dL     Marital Status:   Alcohol History:  reports current alcohol use.  Tobacco History:  reports that she has been smoking cigarettes. She has a 15.00 pack-year smoking history. She has never used smokeless tobacco.  Drug History:  reports no history of drug use.    Health Maintenance Topics with due status: Not Due       Topic Last Completion Date    DEXA Scan 05/31/2019    TETANUS VACCINE 09/18/2019    Mammogram 09/29/2021    Lipid Panel 04/25/2022     Immunization History   Administered Date(s) Administered    COVID-19, MRNA, LN-S, PF (MODERNA FULL 0.5 ML DOSE) 02/26/2021, 03/26/2021, 12/14/2021    Influenza - High Dose - PF (65 years and older) 09/18/2019    Influenza - Quadrivalent - High Dose - PF (65 years and older) 08/20/2020, 10/25/2021    PPD Test 09/04/2019    Pneumococcal Conjugate - 13 Valent 03/06/2019    Pneumococcal Polysaccharide - 23 Valent 07/21/2020    Tdap 09/18/2019       Review of patient's allergies indicates:  No Known Allergies    Current Outpatient Medications:     albuterol (PROVENTIL/VENTOLIN HFA) 90 mcg/actuation inhaler, Inhale 2 puffs into the lungs every 6 (six) hours as needed for Wheezing. Rescue, Disp: 18 g, Rfl: 2    ANORO ELLIPTA 62.5-25 mcg/actuation DsDv, Inhale 1 puff into the lungs once daily., Disp: 60 each, Rfl: 5    betamethasone dipropionate 0.05 % cream, Apply topically 2 (two) times daily., Disp: 45 g, Rfl: 2    cetirizine HCl/pseudoephedrine (ZYRTEC-D ORAL), Take by mouth., Disp: , Rfl:      ferrous sulfate (FEOSOL) 325 mg (65 mg iron) Tab tablet, Take 1 tablet (325 mg total) by mouth once daily., Disp: , Rfl: 0    fluticasone propionate (FLONASE) 50 mcg/actuation nasal spray, 1 spray (50 mcg total) by Each Nostril route once daily., Disp: 16 g, Rfl: 2    HYDROcodone-acetaminophen (NORCO)  mg per tablet, Take 1 tablet by mouth every 8 (eight) hours as needed for Pain., Disp: 90 tablet, Rfl: 0    HYDROcodone-acetaminophen (NORCO)  mg per tablet, Take 1 tablet by mouth every 8 (eight) hours as needed for Pain., Disp: 90 tablet, Rfl: 0    HYDROcodone-acetaminophen (NORCO)  mg per tablet, Take 1 tablet by mouth every 8 (eight) hours as needed for Pain., Disp: 90 tablet, Rfl: 0    lisinopriL 10 MG tablet, Take 1 tablet (10 mg total) by mouth once daily., Disp: 90 tablet, Rfl: 1    loratadine (CLARITIN) 10 mg tablet, Take 10 mg by mouth once daily., Disp: , Rfl:     triamcinolone acetonide 0.1% (KENALOG) 0.1 % cream, Apply topically 2 (two) times daily., Disp: 45 g, Rfl: 2    atorvastatin (LIPITOR) 10 MG tablet, Take 1 tablet (10 mg total) by mouth every evening., Disp: 90 tablet, Rfl: 1    buPROPion (WELLBUTRIN SR) 150 MG TBSR 12 hr tablet, Take 1 tablet (150 mg total) by mouth 2 (two) times daily., Disp: 180 tablet, Rfl: 1    FLUoxetine 40 MG capsule, Take 1 capsule (40 mg total) by mouth once daily., Disp: 90 capsule, Rfl: 1    gabapentin (NEURONTIN) 300 MG capsule, Take 1 capsule (300 mg total) by mouth 3 (three) times daily., Disp: 270 capsule, Rfl: 1    HYDROcodone-acetaminophen (NORCO)  mg per tablet, Take 1 tablet by mouth every 8 (eight) hours as needed for Pain., Disp: 90 tablet, Rfl: 0    [START ON 5/26/2022] HYDROcodone-acetaminophen (NORCO)  mg per tablet, Take 1 tablet by mouth every 8 (eight) hours as needed for Pain., Disp: 90 tablet, Rfl: 0    [START ON 6/25/2022] HYDROcodone-acetaminophen (NORCO)  mg per tablet, Take 1 tablet by mouth every 8  "(eight) hours as needed for Pain., Disp: 90 tablet, Rfl: 0    traZODone (DESYREL) 50 MG tablet, Take 1 tablet (50 mg total) by mouth nightly as needed for Insomnia., Disp: 90 tablet, Rfl: 1  No current facility-administered medications for this visit.    Facility-Administered Medications Ordered in Other Visits:     lidocaine (PF) 10 mg/ml (1%) injection 5 mg, 0.5 mL, Intradermal, Once, Azeem Anderson MD    Review of Systems   Constitutional: Negative for chills, fever and unexpected weight change (wt down 12lbs since Jan, intentional).   HENT: Negative for postnasal drip, sore throat and trouble swallowing.    Eyes: Negative for visual disturbance.   Respiratory: Positive for shortness of breath ( mild with walking longer distance, only using anoro occas d/t cost and rarely uses albuterol). Negative for cough and wheezing.    Cardiovascular: Negative for chest pain, palpitations and leg swelling.   Gastrointestinal: Negative for abdominal pain, blood in stool, constipation and diarrhea.   Genitourinary: Negative for dysuria and hematuria.   Musculoskeletal: Positive for arthralgias (chronic right knee pain) and gait problem (mobility limited d/t knee pain, walks with cane).   Skin: Negative for rash.   Neurological: Negative for dizziness, syncope, speech difficulty, numbness and headaches.   Psychiatric/Behavioral: Negative for dysphoric mood. The patient is not nervous/anxious.           Objective:      Vitals:    04/26/22 0858   BP: 122/76   Pulse: 74   SpO2: 97%   Weight: 123.9 kg (273 lb 3.2 oz)   Height: 5' 3" (1.6 m)     Physical Exam  Vitals and nursing note reviewed.   Constitutional:       General: She is not in acute distress.     Appearance: She is well-developed. She is obese.   HENT:      Head: Normocephalic and atraumatic.      Right Ear: Tympanic membrane and ear canal normal.      Left Ear: Tympanic membrane and ear canal normal.   Neck:      Vascular: No carotid bruit.   Cardiovascular:      " Rate and Rhythm: Normal rate and regular rhythm.      Heart sounds: No murmur heard.    No friction rub. No gallop.   Pulmonary:      Effort: Pulmonary effort is normal. No respiratory distress.      Breath sounds: Normal breath sounds. No wheezing or rales.   Abdominal:      General: There is no distension.      Palpations: Abdomen is soft.      Tenderness: There is no abdominal tenderness.   Musculoskeletal:      Cervical back: Neck supple.      Right knee: No effusion or erythema. Normal range of motion.      Right lower leg: No edema.      Left lower leg: No edema.   Lymphadenopathy:      Cervical: No cervical adenopathy.   Skin:     General: Skin is warm and dry.      Findings: No rash.   Neurological:      General: No focal deficit present.      Mental Status: She is alert and oriented to person, place, and time.      Gait: Gait abnormal (antalgic gait).           Assessment:       1. Essential hypertension    2. Dyslipidemia    3. Chronic obstructive pulmonary disease, unspecified COPD type    4. Chronic pain of right knee    5. Stage 3a chronic kidney disease    6. Mild episode of recurrent major depressive disorder    7. Primary insomnia    8. Cigarette smoker           Plan:       Essential hypertension  Comments:  BP well controlled    Dyslipidemia  Comments:  reviewed recent labs with pt, well controlled  Orders:  -     atorvastatin (LIPITOR) 10 MG tablet; Take 1 tablet (10 mg total) by mouth every evening.  Dispense: 90 tablet; Refill: 1    Chronic obstructive pulmonary disease, unspecified COPD type  Comments:  stable though admits only uses anoro occas d/t cost concerns. still smoking    Chronic pain of right knee  Comments:  chronic pain since TKR with complications and now reports increase in left knee pain- offered referral to see Dr. Marie though states will wait for now  Orders:  -     gabapentin (NEURONTIN) 300 MG capsule; Take 1 capsule (300 mg total) by mouth 3 (three) times daily.   Dispense: 270 capsule; Refill: 1    Stage 3a chronic kidney disease  Comments:  stable on recent labs    Mild episode of recurrent major depressive disorder  Comments:  stable on med  Orders:  -     FLUoxetine 40 MG capsule; Take 1 capsule (40 mg total) by mouth once daily.  Dispense: 90 capsule; Refill: 1  -     buPROPion (WELLBUTRIN SR) 150 MG TBSR 12 hr tablet; Take 1 tablet (150 mg total) by mouth 2 (two) times daily.  Dispense: 180 tablet; Refill: 1    Primary insomnia  Comments:  stable on med  Orders:  -     traZODone (DESYREL) 50 MG tablet; Take 1 tablet (50 mg total) by mouth nightly as needed for Insomnia.  Dispense: 90 tablet; Refill: 1    Cigarette smoker  Comments:  smoking cessation counseling provided, UTD with LDCT  Orders:  -     buPROPion (WELLBUTRIN SR) 150 MG TBSR 12 hr tablet; Take 1 tablet (150 mg total) by mouth 2 (two) times daily.  Dispense: 180 tablet; Refill: 1    Assistance with smoking cessation was offered, including:  [x]  Medications  [x]  Counseling  []  Printed Information on Smoking Cessation  [x]  Referral to a Smoking Cessation Program    Patient was counseled regarding smoking for 3-10 minutes.    Follow up in about 3 months (around 7/26/2022) for Dr. You next visit, HTN, arthritis, COPD.          Counseled on age and gender appropriate medical preventative services, including cancer screenings, immunizations, overall nutritional health, need for a consistent exercise regimen and an overall push towards maintaining a vigorous and active lifestyle.      4/26/2022 Roselyn Cote NP

## 2022-06-24 ENCOUNTER — OFFICE VISIT (OUTPATIENT)
Dept: FAMILY MEDICINE | Facility: CLINIC | Age: 67
End: 2022-06-24
Payer: MEDICARE

## 2022-06-24 ENCOUNTER — HOSPITAL ENCOUNTER (OUTPATIENT)
Dept: RADIOLOGY | Facility: HOSPITAL | Age: 67
Discharge: HOME | End: 2022-06-24
Attending: PHYSICIAN ASSISTANT
Payer: MEDICARE

## 2022-06-24 ENCOUNTER — TELEPHONE (OUTPATIENT)
Dept: FAMILY MEDICINE | Facility: CLINIC | Age: 67
End: 2022-06-24

## 2022-06-24 VITALS
HEIGHT: 63 IN | TEMPERATURE: 98 F | DIASTOLIC BLOOD PRESSURE: 72 MMHG | BODY MASS INDEX: 47.66 KG/M2 | WEIGHT: 269 LBS | SYSTOLIC BLOOD PRESSURE: 108 MMHG

## 2022-06-24 DIAGNOSIS — L03.113 CELLULITIS OF RIGHT UPPER EXTREMITY: Primary | ICD-10-CM

## 2022-06-24 DIAGNOSIS — E78.5 DYSLIPIDEMIA: ICD-10-CM

## 2022-06-24 DIAGNOSIS — I10 ESSENTIAL HYPERTENSION: ICD-10-CM
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
## 2022-06-24 DIAGNOSIS — N18.31 STAGE 3A CHRONIC KIDNEY DISEASE: ICD-10-CM

## 2022-06-24 DIAGNOSIS — J44.9 CHRONIC OBSTRUCTIVE PULMONARY DISEASE, UNSPECIFIED COPD TYPE: ICD-10-CM

## 2022-06-24 PROCEDURE — 3288F PR FALLS RISK ASSESSMENT DOCUMENTED: ICD-10-PCS | Mod: CPTII,S$GLB,, | Performed by: PHYSICIAN ASSISTANT

## 2022-06-24 PROCEDURE — 1159F MED LIST DOCD IN RCRD: CPT | Mod: CPTII,S$GLB,, | Performed by: PHYSICIAN ASSISTANT

## 2022-06-24 PROCEDURE — 3061F PR NEG MICROALBUMINURIA RESULT DOCUMENTED/REVIEW: ICD-10-PCS | Mod: CPTII,S$GLB,, | Performed by: PHYSICIAN ASSISTANT

## 2022-06-24 PROCEDURE — 3008F BODY MASS INDEX DOCD: CPT | Mod: CPTII,S$GLB,, | Performed by: PHYSICIAN ASSISTANT

## 2022-06-24 PROCEDURE — 1101F PR PT FALLS ASSESS DOC 0-1 FALLS W/OUT INJ PAST YR: ICD-10-PCS | Mod: CPTII,S$GLB,, | Performed by: PHYSICIAN ASSISTANT

## 2022-06-24 PROCEDURE — 1160F PR REVIEW ALL MEDS BY PRESCRIBER/CLIN PHARMACIST DOCUMENTED: ICD-10-PCS | Mod: CPTII,S$GLB,, | Performed by: PHYSICIAN ASSISTANT

## 2022-06-24 PROCEDURE — 3288F FALL RISK ASSESSMENT DOCD: CPT | Mod: CPTII,S$GLB,, | Performed by: PHYSICIAN ASSISTANT

## 2022-06-24 PROCEDURE — 3008F PR BODY MASS INDEX (BMI) DOCUMENTED: ICD-10-PCS | Mod: CPTII,S$GLB,, | Performed by: PHYSICIAN ASSISTANT

## 2022-06-24 PROCEDURE — 1160F RVW MEDS BY RX/DR IN RCRD: CPT | Mod: CPTII,S$GLB,, | Performed by: PHYSICIAN ASSISTANT

## 2022-06-24 PROCEDURE — 99214 PR OFFICE/OUTPT VISIT, EST, LEVL IV, 30-39 MIN: ICD-10-PCS | Mod: S$GLB,,, | Performed by: PHYSICIAN ASSISTANT

## 2022-06-24 PROCEDURE — 3066F PR DOCUMENTATION OF TREATMENT FOR NEPHROPATHY: ICD-10-PCS | Mod: CPTII,S$GLB,, | Performed by: PHYSICIAN ASSISTANT

## 2022-06-24 PROCEDURE — 73080 X-RAY EXAM OF ELBOW: CPT | Mod: TC,RT

## 2022-06-24 PROCEDURE — 4010F PR ACE/ARB THEARPY RXD/TAKEN: ICD-10-PCS | Mod: CPTII,S$GLB,, | Performed by: PHYSICIAN ASSISTANT

## 2022-06-24 PROCEDURE — 3078F DIAST BP <80 MM HG: CPT | Mod: CPTII,S$GLB,, | Performed by: PHYSICIAN ASSISTANT

## 2022-06-24 PROCEDURE — 3078F PR MOST RECENT DIASTOLIC BLOOD PRESSURE < 80 MM HG: ICD-10-PCS | Mod: CPTII,S$GLB,, | Performed by: PHYSICIAN ASSISTANT

## 2022-06-24 PROCEDURE — 73090 X-RAY EXAM OF FOREARM: CPT | Mod: TC,RT

## 2022-06-24 PROCEDURE — 4010F ACE/ARB THERAPY RXD/TAKEN: CPT | Mod: CPTII,S$GLB,, | Performed by: PHYSICIAN ASSISTANT

## 2022-06-24 PROCEDURE — 3074F PR MOST RECENT SYSTOLIC BLOOD PRESSURE < 130 MM HG: ICD-10-PCS | Mod: CPTII,S$GLB,, | Performed by: PHYSICIAN ASSISTANT

## 2022-06-24 PROCEDURE — 3066F NEPHROPATHY DOC TX: CPT | Mod: CPTII,S$GLB,, | Performed by: PHYSICIAN ASSISTANT

## 2022-06-24 PROCEDURE — 3074F SYST BP LT 130 MM HG: CPT | Mod: CPTII,S$GLB,, | Performed by: PHYSICIAN ASSISTANT

## 2022-06-24 PROCEDURE — 99214 OFFICE O/P EST MOD 30 MIN: CPT | Mod: S$GLB,,, | Performed by: PHYSICIAN ASSISTANT

## 2022-06-24 PROCEDURE — 1101F PT FALLS ASSESS-DOCD LE1/YR: CPT | Mod: CPTII,S$GLB,, | Performed by: PHYSICIAN ASSISTANT

## 2022-06-24 PROCEDURE — 1159F PR MEDICATION LIST DOCUMENTED IN MEDICAL RECORD: ICD-10-PCS | Mod: CPTII,S$GLB,, | Performed by: PHYSICIAN ASSISTANT

## 2022-06-24 PROCEDURE — 3061F NEG MICROALBUMINURIA REV: CPT | Mod: CPTII,S$GLB,, | Performed by: PHYSICIAN ASSISTANT

## 2022-06-24 RX ORDER — MUPIROCIN 20 MG/G
OINTMENT TOPICAL 3 TIMES DAILY
Qty: 30 G | Refills: 0 | Status: ON HOLD | OUTPATIENT
Start: 2022-06-24 | End: 2022-12-26 | Stop reason: HOSPADM

## 2022-06-24 RX ORDER — LEVOFLOXACIN 750 MG/1
750 TABLET ORAL DAILY
COMMUNITY
Start: 2022-06-23 | End: 2022-11-08

## 2022-06-24 RX ORDER — CLINDAMYCIN HYDROCHLORIDE 300 MG/1
300 CAPSULE ORAL 3 TIMES DAILY
Qty: 30 CAPSULE | Refills: 0 | Status: SHIPPED | OUTPATIENT
Start: 2022-06-24 | End: 2022-07-04

## 2022-06-24 RX ORDER — MUPIROCIN 20 MG/G
OINTMENT TOPICAL
Status: ON HOLD | COMMUNITY
Start: 2022-06-23 | End: 2022-12-26 | Stop reason: HOSPADM

## 2022-06-24 NOTE — PROGRESS NOTES
"  SUBJECTIVE:    Patient ID: Iris Mueller is a 66 y.o. female.    Chief Complaint: Animal Bite (Did not bring bottles//she was scratched by her daughter's dog//bs)    Pt is a 66 y.o. female who presents today for an urgent visit with a CC of "a dog bite to the right arm." On June 17th, she sustained a dog bite to the right forearm.  2 superficial incisor puncture wounds located on the right forearm resulted from the bite. She did not present to the ER or urgent care after the incident and try conservative measures at home.  She was applying OTC Neosporin to the site, but it has not been efficacious.  Since the incident, she noticed redness and warmth spreading both proximal and distally across the right forearm.  She presented to an urgent care yesterday, was prescribed Levofloxacin 750mg q.d. and has only taken 1 dose at this time.  She denies any joint pain, joint swelling, or decreased ROM on flexion or extension of the right forearm or right wrist.  She states the redness was below the elbow yesterday, but has spread past the elbow joint today.  She denies any fevers or chills.    Office Visit on 01/25/2022   Component Date Value Ref Range Status    WBC 04/25/2022 5.6  3.8 - 10.8 Thousand/uL Final    RBC 04/25/2022 4.16  3.80 - 5.10 Million/uL Final    Hemoglobin 04/25/2022 12.7  11.7 - 15.5 g/dL Final    Hematocrit 04/25/2022 39.7  35.0 - 45.0 % Final    MCV 04/25/2022 95.4  80.0 - 100.0 fL Final    MCH 04/25/2022 30.5  27.0 - 33.0 pg Final    MCHC 04/25/2022 32.0  32.0 - 36.0 g/dL Final    RDW 04/25/2022 13.8  11.0 - 15.0 % Final    Platelets 04/25/2022 297  140 - 400 Thousand/uL Final    MPV 04/25/2022 9.6  7.5 - 12.5 fL Final    Neutrophils, Abs 04/25/2022 3,987  1,500 - 7,800 cells/uL Final    Lymph # 04/25/2022 974  850 - 3,900 cells/uL Final    Mono # 04/25/2022 398  200 - 950 cells/uL Final    Eos # 04/25/2022 202  15 - 500 cells/uL Final    Baso # 04/25/2022 39  0 - 200 " cells/uL Final    Neutrophils Relative 04/25/2022 71.2  % Final    Lymph % 04/25/2022 17.4  % Final    Mono % 04/25/2022 7.1  % Final    Eosinophil % 04/25/2022 3.6  % Final    Basophil % 04/25/2022 0.7  % Final    Glucose 04/25/2022 76  65 - 99 mg/dL Final    BUN 04/25/2022 17  7 - 25 mg/dL Final    Creatinine 04/25/2022 1.23 (A) 0.50 - 0.99 mg/dL Final    eGFR if non African American 04/25/2022 45 (A) > OR = 60 mL/min/1.73m2 Final    eGFR if  04/25/2022 52 (A) > OR = 60 mL/min/1.73m2 Final    BUN/Creatinine Ratio 04/25/2022 14  6 - 22 (calc) Final    Sodium 04/25/2022 138  135 - 146 mmol/L Final    Potassium 04/25/2022 4.0  3.5 - 5.3 mmol/L Final    Chloride 04/25/2022 103  98 - 110 mmol/L Final    CO2 04/25/2022 29  20 - 32 mmol/L Final    Calcium 04/25/2022 9.3  8.6 - 10.4 mg/dL Final    Total Protein 04/25/2022 6.3  6.1 - 8.1 g/dL Final    Albumin 04/25/2022 4.0  3.6 - 5.1 g/dL Final    Globulin, Total 04/25/2022 2.3  1.9 - 3.7 g/dL (calc) Final    Albumin/Globulin Ratio 04/25/2022 1.7  1.0 - 2.5 (calc) Final    Total Bilirubin 04/25/2022 0.5  0.2 - 1.2 mg/dL Final    Alkaline Phosphatase 04/25/2022 85  37 - 153 U/L Final    AST 04/25/2022 10  10 - 35 U/L Final    ALT 04/25/2022 7  6 - 29 U/L Final    Cholesterol 04/25/2022 151  <200 mg/dL Final    HDL 04/25/2022 50  > OR = 50 mg/dL Final    Triglycerides 04/25/2022 107  <150 mg/dL Final    LDL Cholesterol 04/25/2022 81  mg/dL (calc) Final    HDL/Cholesterol Ratio 04/25/2022 3.0  <5.0 (calc) Final    Non HDL Chol. (LDL+VLDL) 04/25/2022 101  <130 mg/dL (calc) Final    Creatinine, Urine 01/25/2022 25  20 - 275 mg/dL Final    Microalb, Ur 01/25/2022 0.2  See Note: mg/dL Final    Microalb/Creat Ratio 01/25/2022 8  <30 mcg/mg creat Final    Color, UA 01/25/2022 YELLOW  YELLOW Final    Appearance, UA 01/25/2022 CLEAR  CLEAR Final    Specific Gravity, UA 01/25/2022 1.007  1.001 - 1.035 Final    pH, UA 01/25/2022 6.5   5.0 - 8.0 Final    Glucose, UA 01/25/2022 NEGATIVE  NEGATIVE Final    Bilirubin, UA 01/25/2022 NEGATIVE  NEGATIVE Final    Ketones, UA 01/25/2022 NEGATIVE  NEGATIVE Final    Occult Blood UA 01/25/2022 NEGATIVE  NEGATIVE Final    Protein, UA 01/25/2022 NEGATIVE  NEGATIVE Final    Nitrite, UA 01/25/2022 NEGATIVE  NEGATIVE Final    Leukocytes, UA 01/25/2022 NEGATIVE  NEGATIVE Final    WBC Casts, UA 01/25/2022 NONE SEEN  < OR = 5 /HPF Final    RBC Casts, UA 01/25/2022 NONE SEEN  < OR = 2 /HPF Final    Squam Epithel, UA 01/25/2022 NONE SEEN  < OR = 5 /HPF Final    Bacteria, UA 01/25/2022 NONE SEEN  NONE SEEN /HPF Final    Hyaline Casts, UA 01/25/2022 NONE SEEN  NONE SEEN /LPF Final    Reflexive Urine Culture 01/25/2022    Final    TSH w/reflex to FT4 04/25/2022 1.49  0.40 - 4.50 mIU/L Final    Amphetamines 01/25/2022 NEGATIVE  <500 ng/mL Final    Barbiturates 01/25/2022 NEGATIVE  <300 ng/mL Final    Benzodiazepines 01/25/2022 NEGATIVE  <100 ng/mL Final    Cocaine Metabolites 01/25/2022 NEGATIVE  <150 ng/mL Final    Methadone 01/25/2022 NEGATIVE  <100 ng/mL Final    Opiates 01/25/2022 POSITIVE (A) <100 ng/mL Final    Codeine 01/25/2022 NEGATIVE  <50 ng/mL Final    Hydrocodone 01/25/2022 90 (A) <50 ng/mL Final    Hydromorphone 01/25/2022 NEGATIVE  <50 ng/mL Final    Morphine 01/25/2022 NEGATIVE  <50 ng/mL Final    NORHYDROCODONE 01/25/2022 77 (A) <50 ng/mL Final    Opiates Comments 01/25/2022    Final    Oxycodone 01/25/2022 NEGATIVE  <100 ng/mL Final    Phencyclidine 01/25/2022 NEGATIVE  <25 ng/mL Final    Creatinine 01/25/2022 24.4  > or = 20.0 mg/dL Final    pH 01/25/2022 6.4  4.5 - 9.0 Final    Oxidants, Urine (Tox) 01/25/2022 NEGATIVE  <200 mcg/mL Final    Notes and Comments 01/25/2022    Final       Past Medical History:   Diagnosis Date    Arthritis     Asthma     Encounter for blood transfusion     Hypertension     Wound infection 08/2019    Right knee     Past Surgical  History:   Procedure Laterality Date     SECTION      JOINT REPLACEMENT      Knee    KNEE ARTHROPLASTY Right 2019    Procedure: ARTHROPLASTY, KNEE;  Surgeon: Delio Chung MD;  Location: [facility];  Service: Orthopedics;  Laterality: Right;  anesthesia:  general and block    REPLACEMENT OF WOUND VACUUM-ASSISTED CLOSURE DEVICE Right 2019    Procedure: REPLACEMENT, WOUND VAC;  Surgeon: Delio Chung MD;  Location: [facility] OR;  Service: Orthopedics;  Laterality: Right;    TONSILLECTOMY       Family History   Problem Relation Age of Onset    Alzheimer's disease Mother        Marital Status:   Alcohol History:  reports current alcohol use.  Tobacco History:  reports that she has been smoking cigarettes. She has a 15.00 pack-year smoking history. She has never used smokeless tobacco.  Drug History:  reports no history of drug use.    Health Maintenance Topics with due status: Not Due       Topic Last Completion Date    TETANUS VACCINE 2019    Mammogram 2021    Lipid Panel 2022     Immunization History   Administered Date(s) Administered    COVID-19, MRNA, LN-S, PF (MODERNA FULL 0.5 ML DOSE) 2021, 2021, 2021    Influenza - High Dose - PF (65 years and older) 2019    Influenza - Quadrivalent - High Dose - PF (65 years and older) 2020, 10/25/2021    PPD Test 2019    Pneumococcal Conjugate - 13 Valent 2019    Pneumococcal Polysaccharide - 23 Valent 2020    Tdap 2019       Review of patient's allergies indicates:  No Known Allergies    Current Outpatient Medications:     albuterol (PROVENTIL/VENTOLIN HFA) 90 mcg/actuation inhaler, Inhale 2 puffs into the lungs every 6 (six) hours as needed for Wheezing. Rescue, Disp: 18 g, Rfl: 2    ANORO ELLIPTA 62.5-25 mcg/actuation DsDv, Inhale 1 puff into the lungs once daily., Disp: 60 each, Rfl: 5    atorvastatin (LIPITOR) 10 MG tablet, Take 1 tablet (10 mg total) by mouth  every evening., Disp: 90 tablet, Rfl: 1    betamethasone dipropionate 0.05 % cream, Apply topically 2 (two) times daily., Disp: 45 g, Rfl: 2    buPROPion (WELLBUTRIN SR) 150 MG TBSR 12 hr tablet, Take 1 tablet (150 mg total) by mouth 2 (two) times daily., Disp: 180 tablet, Rfl: 1    cetirizine HCl/pseudoephedrine (ZYRTEC-D ORAL), Take by mouth., Disp: , Rfl:     clindamycin (CLEOCIN) 300 MG capsule, Take 1 capsule (300 mg total) by mouth 3 (three) times daily. for 10 days, Disp: 30 capsule, Rfl: 0    ferrous sulfate (FEOSOL) 325 mg (65 mg iron) Tab tablet, Take 1 tablet (325 mg total) by mouth once daily., Disp: , Rfl: 0    FLUoxetine 40 MG capsule, Take 1 capsule (40 mg total) by mouth once daily., Disp: 90 capsule, Rfl: 1    fluticasone propionate (FLONASE) 50 mcg/actuation nasal spray, 1 spray (50 mcg total) by Each Nostril route once daily., Disp: 16 g, Rfl: 2    gabapentin (NEURONTIN) 300 MG capsule, Take 1 capsule (300 mg total) by mouth 3 (three) times daily., Disp: 270 capsule, Rfl: 1    HYDROcodone-acetaminophen (NORCO)  mg per tablet, Take 1 tablet by mouth every 8 (eight) hours as needed for Pain., Disp: 90 tablet, Rfl: 0    HYDROcodone-acetaminophen (NORCO)  mg per tablet, Take 1 tablet by mouth every 8 (eight) hours as needed for Pain., Disp: 90 tablet, Rfl: 0    HYDROcodone-acetaminophen (NORCO)  mg per tablet, Take 1 tablet by mouth every 8 (eight) hours as needed for Pain., Disp: 90 tablet, Rfl: 0    HYDROcodone-acetaminophen (NORCO)  mg per tablet, Take 1 tablet by mouth every 8 (eight) hours as needed for Pain., Disp: 90 tablet, Rfl: 0    HYDROcodone-acetaminophen (NORCO)  mg per tablet, Take 1 tablet by mouth every 8 (eight) hours as needed for Pain., Disp: 90 tablet, Rfl: 0    [START ON 6/25/2022] HYDROcodone-acetaminophen (NORCO)  mg per tablet, Take 1 tablet by mouth every 8 (eight) hours as needed for Pain., Disp: 90 tablet, Rfl: 0     "levoFLOXacin (LEVAQUIN) 750 MG tablet, Take 750 mg by mouth once daily., Disp: , Rfl:     lisinopriL 10 MG tablet, Take 1 tablet (10 mg total) by mouth once daily., Disp: 90 tablet, Rfl: 1    loratadine (CLARITIN) 10 mg tablet, Take 10 mg by mouth once daily., Disp: , Rfl:     mupirocin (BACTROBAN) 2 % ointment, Apply topically., Disp: , Rfl:     mupirocin (BACTROBAN) 2 % ointment, Apply topically 3 (three) times daily., Disp: 30 g, Rfl: 0    traZODone (DESYREL) 50 MG tablet, Take 1 tablet (50 mg total) by mouth nightly as needed for Insomnia., Disp: 90 tablet, Rfl: 1    triamcinolone acetonide 0.1% (KENALOG) 0.1 % cream, Apply topically 2 (two) times daily., Disp: 45 g, Rfl: 2  No current facility-administered medications for this visit.    Facility-Administered Medications Ordered in Other Visits:     lidocaine (PF) 10 mg/ml (1%) injection 5 mg, 0.5 mL, Intradermal, Once, Azeem Anderson MD    Review of Systems   Constitutional: Negative for chills, fatigue and fever.   Respiratory: Negative for cough, shortness of breath and wheezing.    Cardiovascular: Negative for chest pain and palpitations.   Gastrointestinal: Negative for abdominal pain, constipation, diarrhea, nausea and vomiting.   Genitourinary: Negative for dysuria and hematuria.   Musculoskeletal: Negative for arthralgias and myalgias.   Skin: Positive for wound ("Dog bit to the right arm and redness in the forearm.").   Neurological: Negative for dizziness, syncope, weakness and light-headedness.          Objective:      Vitals:    06/24/22 1145   BP: 108/72   Temp: 97.8 °F (36.6 °C)   Weight: 122 kg (269 lb)   Height: 5' 3" (1.6 m)     Physical Exam  Vitals and nursing note reviewed.   Constitutional:       General: She is not in acute distress.     Appearance: Normal appearance. She is obese. She is not ill-appearing, toxic-appearing or diaphoretic.      Comments: Morbidly obese WF sitting erect in an office chair in no acute distress. "   HENT:      Head: Normocephalic and atraumatic.      Right Ear: External ear normal.      Left Ear: External ear normal.      Nose: Nose normal. No congestion or rhinorrhea.      Mouth/Throat:      Mouth: Mucous membranes are moist.      Pharynx: Oropharynx is clear.   Eyes:      General: No scleral icterus.     Extraocular Movements: Extraocular movements intact.      Conjunctiva/sclera: Conjunctivae normal.      Pupils: Pupils are equal, round, and reactive to light.   Cardiovascular:      Rate and Rhythm: Normal rate and regular rhythm.      Pulses: Normal pulses.      Heart sounds: Normal heart sounds. No murmur heard.    No friction rub.   Pulmonary:      Effort: Pulmonary effort is normal. No respiratory distress.      Breath sounds: Normal breath sounds. No wheezing, rhonchi or rales.   Abdominal:      General: There is no distension.      Palpations: Abdomen is soft.      Tenderness: There is no abdominal tenderness. There is no guarding or rebound.   Musculoskeletal:         General: Normal range of motion.      Cervical back: Normal range of motion and neck supple.      Right lower leg: No edema.      Left lower leg: No edema.      Comments: 5/5  strength noted bilaterally.  5/5 strength against resistance in the upper extremity.  Right elbow has full ROM on flexion and extension no reproducible pain.  Wrist has full rotational ROM, flexion, and extension.  No pain to deep palpation of the elbow joint or metacarpal region.   Skin:     General: Skin is warm and dry.      Findings: Erythema and rash present.          Neurological:      Mental Status: She is alert. Mental status is at baseline.      Cranial Nerves: No cranial nerve deficit.   Psychiatric:         Mood and Affect: Mood normal.         Behavior: Behavior normal. Behavior is cooperative.           Assessment:       1. Cellulitis of right upper extremity    2. Dog bite, initial encounter    3. Essential hypertension    4. Chronic obstructive  pulmonary disease, unspecified COPD type    5. Stage 3a chronic kidney disease    6. Dyslipidemia           Plan:       Cellulitis of right upper extremity  Teofilo the borders of the cellulitic infection with a marker - if erythema rapidly progresses past the marked borders, present to the ER immediately or contact the office.  Start Cleocin 300 mg t.i.d. x10 days.  Elevate the upper extremity when stationary.  Clean the upper extremity with soap and water alone, avoiding hydrogen peroxide and alcohol.  -     clindamycin (CLEOCIN) 300 MG capsule; Take 1 capsule (300 mg total) by mouth 3 (three) times daily. for 10 days  Dispense: 30 capsule; Refill: 0    Dog bite, initial encounter  Will obtain x-rays of the right forearm and elbow to rule out the possibility of foreign body.  Continue Levaquin 750mg q.d. x10 days as prescribed by urgent care.  Begin applying mupirocin 2% ointment to each puncture wound t.i.d. and keep areas covered with a Band-Aid or 4 x 4 gauze.  -     X-Ray Forearm Right; Future; Expected date: 06/24/2022  -     X-Ray Elbow Complete Right; Future; Expected date: 06/24/2022  -     mupirocin (BACTROBAN) 2 % ointment; Apply topically 3 (three) times daily.  Dispense: 30 g; Refill: 0    Essential hypertension  Stable and well controlled.  Continue as is.    Chronic obstructive pulmonary disease, unspecified COPD type    Stage 3a chronic kidney disease    Dyslipidemia      Follow up in about 1 week (around 7/1/2022) for Cellulitis, dog bite wound.        6/24/2022 [place] PA-C

## 2022-06-24 NOTE — TELEPHONE ENCOUNTER
"----- Message from Carmen Hemphill sent at 6/24/2022 10:22 AM CDT -----  Pt calling said she is "very concerned" her daughters dog scrapped her skin on the right forearm with his teeth that did break the skin she hit the same area on a table a few days later her arm is swollen and painful went to UC yesterday was given Levofloxacin 75 mg but now the wrist is hurting different now.  # [phone]    "

## 2022-06-27 ENCOUNTER — TELEPHONE (OUTPATIENT)
Dept: FAMILY MEDICINE | Facility: CLINIC | Age: 67
End: 2022-06-27

## 2022-06-27 NOTE — TELEPHONE ENCOUNTER
----- Message from LUKE Parada sent at 6/24/2022  2:14 PM CDT -----  Please contact patient and inform her that her x-rays of the right forearm and elbow were reviewed.  Both are NEGATIVE for foreign bodies or fractures.  Continue with the treatment regimen as discussed in office visit.

## 2022-06-28 ENCOUNTER — TELEPHONE (OUTPATIENT)
Dept: FAMILY MEDICINE | Facility: CLINIC | Age: 67
End: 2022-06-28

## 2022-06-30 ENCOUNTER — OFFICE VISIT (OUTPATIENT)
Dept: FAMILY MEDICINE | Facility: CLINIC | Age: 67
End: 2022-06-30
Payer: MEDICARE

## 2022-06-30 VITALS
TEMPERATURE: 98 F | BODY MASS INDEX: 49.61 KG/M2 | HEIGHT: 63 IN | DIASTOLIC BLOOD PRESSURE: 78 MMHG | SYSTOLIC BLOOD PRESSURE: 128 MMHG | OXYGEN SATURATION: 95 % | HEART RATE: 76 BPM | WEIGHT: 280 LBS

## 2022-06-30 DIAGNOSIS — N18.31 STAGE 3A CHRONIC KIDNEY DISEASE: ICD-10-CM

## 2022-06-30 DIAGNOSIS — I10 ESSENTIAL HYPERTENSION: ICD-10-CM
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
## 2022-06-30 DIAGNOSIS — J44.9 CHRONIC OBSTRUCTIVE PULMONARY DISEASE, UNSPECIFIED COPD TYPE: ICD-10-CM

## 2022-06-30 DIAGNOSIS — L03.113 CELLULITIS OF RIGHT UPPER EXTREMITY: Primary | ICD-10-CM

## 2022-06-30 PROCEDURE — 3061F PR NEG MICROALBUMINURIA RESULT DOCUMENTED/REVIEW: ICD-10-PCS | Mod: CPTII,S$GLB,, | Performed by: PHYSICIAN ASSISTANT

## 2022-06-30 PROCEDURE — 1160F RVW MEDS BY RX/DR IN RCRD: CPT | Mod: CPTII,S$GLB,, | Performed by: PHYSICIAN ASSISTANT

## 2022-06-30 PROCEDURE — 3061F NEG MICROALBUMINURIA REV: CPT | Mod: CPTII,S$GLB,, | Performed by: PHYSICIAN ASSISTANT

## 2022-06-30 PROCEDURE — 3066F PR DOCUMENTATION OF TREATMENT FOR NEPHROPATHY: ICD-10-PCS | Mod: CPTII,S$GLB,, | Performed by: PHYSICIAN ASSISTANT

## 2022-06-30 PROCEDURE — 3288F PR FALLS RISK ASSESSMENT DOCUMENTED: ICD-10-PCS | Mod: CPTII,S$GLB,, | Performed by: PHYSICIAN ASSISTANT

## 2022-06-30 PROCEDURE — 1159F MED LIST DOCD IN RCRD: CPT | Mod: CPTII,S$GLB,, | Performed by: PHYSICIAN ASSISTANT

## 2022-06-30 PROCEDURE — 3074F PR MOST RECENT SYSTOLIC BLOOD PRESSURE < 130 MM HG: ICD-10-PCS | Mod: CPTII,S$GLB,, | Performed by: PHYSICIAN ASSISTANT

## 2022-06-30 PROCEDURE — 1101F PR PT FALLS ASSESS DOC 0-1 FALLS W/OUT INJ PAST YR: ICD-10-PCS | Mod: CPTII,S$GLB,, | Performed by: PHYSICIAN ASSISTANT

## 2022-06-30 PROCEDURE — 1101F PT FALLS ASSESS-DOCD LE1/YR: CPT | Mod: CPTII,S$GLB,, | Performed by: PHYSICIAN ASSISTANT

## 2022-06-30 PROCEDURE — 3288F FALL RISK ASSESSMENT DOCD: CPT | Mod: CPTII,S$GLB,, | Performed by: PHYSICIAN ASSISTANT

## 2022-06-30 PROCEDURE — 3008F PR BODY MASS INDEX (BMI) DOCUMENTED: ICD-10-PCS | Mod: CPTII,S$GLB,, | Performed by: PHYSICIAN ASSISTANT

## 2022-06-30 PROCEDURE — 99213 PR OFFICE/OUTPT VISIT, EST, LEVL III, 20-29 MIN: ICD-10-PCS | Mod: S$GLB,,, | Performed by: PHYSICIAN ASSISTANT

## 2022-06-30 PROCEDURE — 4010F ACE/ARB THERAPY RXD/TAKEN: CPT | Mod: CPTII,S$GLB,, | Performed by: PHYSICIAN ASSISTANT

## 2022-06-30 PROCEDURE — 1159F PR MEDICATION LIST DOCUMENTED IN MEDICAL RECORD: ICD-10-PCS | Mod: CPTII,S$GLB,, | Performed by: PHYSICIAN ASSISTANT

## 2022-06-30 PROCEDURE — 3078F PR MOST RECENT DIASTOLIC BLOOD PRESSURE < 80 MM HG: ICD-10-PCS | Mod: CPTII,S$GLB,, | Performed by: PHYSICIAN ASSISTANT

## 2022-06-30 PROCEDURE — 3078F DIAST BP <80 MM HG: CPT | Mod: CPTII,S$GLB,, | Performed by: PHYSICIAN ASSISTANT

## 2022-06-30 PROCEDURE — 3074F SYST BP LT 130 MM HG: CPT | Mod: CPTII,S$GLB,, | Performed by: PHYSICIAN ASSISTANT

## 2022-06-30 PROCEDURE — 1160F PR REVIEW ALL MEDS BY PRESCRIBER/CLIN PHARMACIST DOCUMENTED: ICD-10-PCS | Mod: CPTII,S$GLB,, | Performed by: PHYSICIAN ASSISTANT

## 2022-06-30 PROCEDURE — 99213 OFFICE O/P EST LOW 20 MIN: CPT | Mod: S$GLB,,, | Performed by: PHYSICIAN ASSISTANT

## 2022-06-30 PROCEDURE — 3066F NEPHROPATHY DOC TX: CPT | Mod: CPTII,S$GLB,, | Performed by: PHYSICIAN ASSISTANT

## 2022-06-30 PROCEDURE — 4010F PR ACE/ARB THEARPY RXD/TAKEN: ICD-10-PCS | Mod: CPTII,S$GLB,, | Performed by: PHYSICIAN ASSISTANT

## 2022-06-30 PROCEDURE — 3008F BODY MASS INDEX DOCD: CPT | Mod: CPTII,S$GLB,, | Performed by: PHYSICIAN ASSISTANT

## 2022-06-30 NOTE — PROGRESS NOTES
SUBJECTIVE:    Patient ID: Iris Mueller is a 66 y.o. female.    Chief Complaint: Follow-up (1 week f/u for right arm wound check//no med bottles//tc)    Pt is a 66 y.o. female who presents today for a 1-week follow-up after being diagnosed with cellulitis of the right arm secondary to a dog bite puncture wound.  X-rays of the right forearm and right elbow were obtained to r/o possible foreign bodies or early signs of osteomyelitis - both NEGATIVE. She was started on Cleocin 300mg t.i.d. x10 days and continued her Levaquin 750mg q.d. x10 days as prescribed by urgent care.  Today, she reports the cellulitic rash has subsided.  She reports a 80-90% improvement since last visit.  She remains compliant with her medication regimen and has approximately 4 days remaining.  She denies any new cellulitic signs of infection, bleeding, or discharge from the dog bite puncture wounds.     Tetanus vaccine is UTD (2019).      Office Visit on 01/25/2022   Component Date Value Ref Range Status    WBC 04/25/2022 5.6  3.8 - 10.8 Thousand/uL Final    RBC 04/25/2022 4.16  3.80 - 5.10 Million/uL Final    Hemoglobin 04/25/2022 12.7  11.7 - 15.5 g/dL Final    Hematocrit 04/25/2022 39.7  35.0 - 45.0 % Final    MCV 04/25/2022 95.4  80.0 - 100.0 fL Final    MCH 04/25/2022 30.5  27.0 - 33.0 pg Final    MCHC 04/25/2022 32.0  32.0 - 36.0 g/dL Final    RDW 04/25/2022 13.8  11.0 - 15.0 % Final    Platelets 04/25/2022 297  140 - 400 Thousand/uL Final    MPV 04/25/2022 9.6  7.5 - 12.5 fL Final    Neutrophils, Abs 04/25/2022 3,987  1,500 - 7,800 cells/uL Final    Lymph # 04/25/2022 974  850 - 3,900 cells/uL Final    Mono # 04/25/2022 398  200 - 950 cells/uL Final    Eos # 04/25/2022 202  15 - 500 cells/uL Final    Baso # 04/25/2022 39  0 - 200 cells/uL Final    Neutrophils Relative 04/25/2022 71.2  % Final    Lymph % 04/25/2022 17.4  % Final    Mono % 04/25/2022 7.1  % Final    Eosinophil % 04/25/2022 3.6  % Final     Basophil % 04/25/2022 0.7  % Final    Glucose 04/25/2022 76  65 - 99 mg/dL Final    BUN 04/25/2022 17  7 - 25 mg/dL Final    Creatinine 04/25/2022 1.23 (A) 0.50 - 0.99 mg/dL Final    eGFR if non African American 04/25/2022 45 (A) > OR = 60 mL/min/1.73m2 Final    eGFR if  04/25/2022 52 (A) > OR = 60 mL/min/1.73m2 Final    BUN/Creatinine Ratio 04/25/2022 14  6 - 22 (calc) Final    Sodium 04/25/2022 138  135 - 146 mmol/L Final    Potassium 04/25/2022 4.0  3.5 - 5.3 mmol/L Final    Chloride 04/25/2022 103  98 - 110 mmol/L Final    CO2 04/25/2022 29  20 - 32 mmol/L Final    Calcium 04/25/2022 9.3  8.6 - 10.4 mg/dL Final    Total Protein 04/25/2022 6.3  6.1 - 8.1 g/dL Final    Albumin 04/25/2022 4.0  3.6 - 5.1 g/dL Final    Globulin, Total 04/25/2022 2.3  1.9 - 3.7 g/dL (calc) Final    Albumin/Globulin Ratio 04/25/2022 1.7  1.0 - 2.5 (calc) Final    Total Bilirubin 04/25/2022 0.5  0.2 - 1.2 mg/dL Final    Alkaline Phosphatase 04/25/2022 85  37 - 153 U/L Final    AST 04/25/2022 10  10 - 35 U/L Final    ALT 04/25/2022 7  6 - 29 U/L Final    Cholesterol 04/25/2022 151  <200 mg/dL Final    HDL 04/25/2022 50  > OR = 50 mg/dL Final    Triglycerides 04/25/2022 107  <150 mg/dL Final    LDL Cholesterol 04/25/2022 81  mg/dL (calc) Final    HDL/Cholesterol Ratio 04/25/2022 3.0  <5.0 (calc) Final    Non HDL Chol. (LDL+VLDL) 04/25/2022 101  <130 mg/dL (calc) Final    Creatinine, Urine 01/25/2022 25  20 - 275 mg/dL Final    Microalb, Ur 01/25/2022 0.2  See Note: mg/dL Final    Microalb/Creat Ratio 01/25/2022 8  <30 mcg/mg creat Final    Color, UA 01/25/2022 YELLOW  YELLOW Final    Appearance, UA 01/25/2022 CLEAR  CLEAR Final    Specific Gravity, UA 01/25/2022 1.007  1.001 - 1.035 Final    pH, UA 01/25/2022 6.5  5.0 - 8.0 Final    Glucose, UA 01/25/2022 NEGATIVE  NEGATIVE Final    Bilirubin, UA 01/25/2022 NEGATIVE  NEGATIVE Final    Ketones, UA 01/25/2022 NEGATIVE  NEGATIVE Final     Occult Blood UA 2022 NEGATIVE  NEGATIVE Final    Protein, UA 2022 NEGATIVE  NEGATIVE Final    Nitrite, UA 2022 NEGATIVE  NEGATIVE Final    Leukocytes, UA 2022 NEGATIVE  NEGATIVE Final    WBC Casts, UA 2022 NONE SEEN  < OR = 5 /HPF Final    RBC Casts, UA 2022 NONE SEEN  < OR = 2 /HPF Final    Squam Epithel, UA 2022 NONE SEEN  < OR = 5 /HPF Final    Bacteria, UA 2022 NONE SEEN  NONE SEEN /HPF Final    Hyaline Casts, UA 2022 NONE SEEN  NONE SEEN /LPF Final    Reflexive Urine Culture 2022    Final    TSH w/reflex to FT4 2022 1.49  0.40 - 4.50 mIU/L Final    Amphetamines 2022 NEGATIVE  <500 ng/mL Final    Barbiturates 2022 NEGATIVE  <300 ng/mL Final    Benzodiazepines 2022 NEGATIVE  <100 ng/mL Final    Cocaine Metabolites 2022 NEGATIVE  <150 ng/mL Final    Methadone 2022 NEGATIVE  <100 ng/mL Final    Opiates 2022 POSITIVE (A) <100 ng/mL Final    Codeine 2022 NEGATIVE  <50 ng/mL Final    Hydrocodone 2022 90 (A) <50 ng/mL Final    Hydromorphone 2022 NEGATIVE  <50 ng/mL Final    Morphine 2022 NEGATIVE  <50 ng/mL Final    NORHYDROCODONE 2022 77 (A) <50 ng/mL Final    Opiates Comments 2022    Final    Oxycodone 2022 NEGATIVE  <100 ng/mL Final    Phencyclidine 2022 NEGATIVE  <25 ng/mL Final    Creatinine 2022 24.4  > or = 20.0 mg/dL Final    pH 2022 6.4  4.5 - 9.0 Final    Oxidants, Urine (Tox) 2022 NEGATIVE  <200 mcg/mL Final    Notes and Comments 2022    Final       Past Medical History:   Diagnosis Date    Arthritis     Asthma     Encounter for blood transfusion     Hypertension     Wound infection 2019    Right knee     Past Surgical History:   Procedure Laterality Date     SECTION      JOINT REPLACEMENT      Knee    KNEE ARTHROPLASTY Right 2019    Procedure: ARTHROPLASTY, KNEE;  Surgeon: Delio CABALLERO  MD [place];  Location: [facility];  Service: Orthopedics;  Laterality: Right;  anesthesia:  general and block    REPLACEMENT OF WOUND VACUUM-ASSISTED CLOSURE DEVICE Right 9/12/2019    Procedure: REPLACEMENT, WOUND VAC;  Surgeon: Delio Chung MD;  Location: [facility] OR;  Service: Orthopedics;  Laterality: Right;    TONSILLECTOMY       Family History   Problem Relation Age of Onset    Alzheimer's disease Mother        Marital Status:   Alcohol History:  reports current alcohol use.  Tobacco History:  reports that she has been smoking cigarettes. She has a 15.00 pack-year smoking history. She has never used smokeless tobacco.  Drug History:  reports no history of drug use.    Health Maintenance Topics with due status: Not Due       Topic Last Completion Date    TETANUS VACCINE 09/18/2019    Mammogram 09/29/2021    Lipid Panel 04/25/2022     Immunization History   Administered Date(s) Administered    COVID-19, MRNA, LN-S, PF (MODERNA FULL 0.5 ML DOSE) 02/26/2021, 03/26/2021, 12/14/2021    Influenza - High Dose - PF (65 years and older) 09/18/2019    Influenza - Quadrivalent - High Dose - PF (65 years and older) 08/20/2020, 10/25/2021    PPD Test 09/04/2019    Pneumococcal Conjugate - 13 Valent 03/06/2019    Pneumococcal Polysaccharide - 23 Valent 07/21/2020    Tdap 09/18/2019       Review of patient's allergies indicates:  No Known Allergies    Current Outpatient Medications:     albuterol (PROVENTIL/VENTOLIN HFA) 90 mcg/actuation inhaler, Inhale 2 puffs into the lungs every 6 (six) hours as needed for Wheezing. Rescue, Disp: 18 g, Rfl: 2    ANORO ELLIPTA 62.5-25 mcg/actuation DsDv, Inhale 1 puff into the lungs once daily., Disp: 60 each, Rfl: 5    atorvastatin (LIPITOR) 10 MG tablet, Take 1 tablet (10 mg total) by mouth every evening., Disp: 90 tablet, Rfl: 1    betamethasone dipropionate 0.05 % cream, Apply topically 2 (two) times daily., Disp: 45 g, Rfl: 2    buPROPion (WELLBUTRIN SR) 150 MG TBSR  12 hr tablet, Take 1 tablet (150 mg total) by mouth 2 (two) times daily., Disp: 180 tablet, Rfl: 1    cetirizine HCl/pseudoephedrine (ZYRTEC-D ORAL), Take by mouth., Disp: , Rfl:     clindamycin (CLEOCIN) 300 MG capsule, Take 1 capsule (300 mg total) by mouth 3 (three) times daily. for 10 days, Disp: 30 capsule, Rfl: 0    ferrous sulfate (FEOSOL) 325 mg (65 mg iron) Tab tablet, Take 1 tablet (325 mg total) by mouth once daily., Disp: , Rfl: 0    FLUoxetine 40 MG capsule, Take 1 capsule (40 mg total) by mouth once daily., Disp: 90 capsule, Rfl: 1    fluticasone propionate (FLONASE) 50 mcg/actuation nasal spray, 1 spray (50 mcg total) by Each Nostril route once daily., Disp: 16 g, Rfl: 2    gabapentin (NEURONTIN) 300 MG capsule, Take 1 capsule (300 mg total) by mouth 3 (three) times daily., Disp: 270 capsule, Rfl: 1    HYDROcodone-acetaminophen (NORCO)  mg per tablet, Take 1 tablet by mouth every 8 (eight) hours as needed for Pain., Disp: 90 tablet, Rfl: 0    HYDROcodone-acetaminophen (NORCO)  mg per tablet, Take 1 tablet by mouth every 8 (eight) hours as needed for Pain., Disp: 90 tablet, Rfl: 0    HYDROcodone-acetaminophen (NORCO)  mg per tablet, Take 1 tablet by mouth every 8 (eight) hours as needed for Pain., Disp: 90 tablet, Rfl: 0    HYDROcodone-acetaminophen (NORCO)  mg per tablet, Take 1 tablet by mouth every 8 (eight) hours as needed for Pain., Disp: 90 tablet, Rfl: 0    HYDROcodone-acetaminophen (NORCO)  mg per tablet, Take 1 tablet by mouth every 8 (eight) hours as needed for Pain., Disp: 90 tablet, Rfl: 0    HYDROcodone-acetaminophen (NORCO)  mg per tablet, Take 1 tablet by mouth every 8 (eight) hours as needed for Pain., Disp: 90 tablet, Rfl: 0    levoFLOXacin (LEVAQUIN) 750 MG tablet, Take 750 mg by mouth once daily., Disp: , Rfl:     lisinopriL 10 MG tablet, Take 1 tablet (10 mg total) by mouth once daily., Disp: 90 tablet, Rfl: 1    loratadine  "(CLARITIN) 10 mg tablet, Take 10 mg by mouth once daily., Disp: , Rfl:     mupirocin (BACTROBAN) 2 % ointment, Apply topically., Disp: , Rfl:     mupirocin (BACTROBAN) 2 % ointment, Apply topically 3 (three) times daily., Disp: 30 g, Rfl: 0    traZODone (DESYREL) 50 MG tablet, Take 1 tablet (50 mg total) by mouth nightly as needed for Insomnia., Disp: 90 tablet, Rfl: 1    triamcinolone acetonide 0.1% (KENALOG) 0.1 % cream, Apply topically 2 (two) times daily., Disp: 45 g, Rfl: 2  No current facility-administered medications for this visit.    Facility-Administered Medications Ordered in Other Visits:     lidocaine (PF) 10 mg/ml (1%) injection 5 mg, 0.5 mL, Intradermal, Once, Azeme Anderson MD    Review of Systems   Constitutional: Negative for activity change, chills, fatigue and fever.   Respiratory: Negative for cough, shortness of breath and wheezing.    Cardiovascular: Negative for chest pain, palpitations and leg swelling.   Gastrointestinal: Negative for abdominal pain, constipation, diarrhea, nausea and vomiting.   Genitourinary: Negative for difficulty urinating, dysuria, frequency and hematuria.   Musculoskeletal: Negative for arthralgias and myalgias.   Skin: Positive for wound ("Much better since last visit."). Negative for color change and rash.   Neurological: Negative for dizziness, syncope, light-headedness and headaches.   Psychiatric/Behavioral: Negative for behavioral problems.          Objective:      Vitals:    06/30/22 1500   BP: 128/78   Pulse: 76   Temp: 98.2 °F (36.8 °C)   SpO2: 95%   Weight: 127 kg (280 lb)   Height: 5' 3" (1.6 m)     Physical Exam  Vitals and nursing note reviewed.   Constitutional:       General: She is not in acute distress.     Appearance: Normal appearance. She is obese. She is not ill-appearing, toxic-appearing or diaphoretic.      Comments: Morbidly obese WF sitting erect in an office chair in no acute distress.   HENT:      Head: Normocephalic and " atraumatic.      Right Ear: External ear normal.      Left Ear: External ear normal.      Nose: Nose normal. No congestion or rhinorrhea.      Mouth/Throat:      Mouth: Mucous membranes are moist.      Pharynx: Oropharynx is clear.   Eyes:      General: No scleral icterus.     Extraocular Movements: Extraocular movements intact.      Conjunctiva/sclera: Conjunctivae normal.      Pupils: Pupils are equal, round, and reactive to light.   Cardiovascular:      Rate and Rhythm: Normal rate and regular rhythm.      Pulses: Normal pulses.      Heart sounds: Murmur (faint, 1/6 systolic aortic murmur noted.) heard.     No friction rub.   Pulmonary:      Effort: Pulmonary effort is normal. No respiratory distress.      Breath sounds: Normal breath sounds. No wheezing, rhonchi or rales.   Abdominal:      General: There is no distension.      Palpations: Abdomen is soft.      Tenderness: There is no abdominal tenderness. There is no guarding or rebound.   Musculoskeletal:         General: Normal range of motion.      Cervical back: Normal range of motion and neck supple.      Right lower leg: No edema.      Left lower leg: No edema.      Comments: 5/5  strength noted bilaterally.  5/5 strength against resistance in the upper extremity.  Right elbow has full ROM on flexion and extension no reproducible pain.  Wrist has full rotational ROM, flexion, and extension.  No pain to deep palpation of the elbow joint or metacarpal region.   Skin:     General: Skin is warm and dry.      Findings: No erythema or rash.          Neurological:      Mental Status: She is alert. Mental status is at baseline.      Cranial Nerves: No cranial nerve deficit.   Psychiatric:         Mood and Affect: Mood normal.         Behavior: Behavior normal. Behavior is cooperative.           Assessment:       1. Cellulitis of right upper extremity    2. Dog bite, initial encounter    3. Essential hypertension    4. Chronic obstructive pulmonary disease,  unspecified COPD type    5. Stage 3a chronic kidney disease           Plan:       Cellulitis of right upper extremity  Symptoms have since subsided since initiating antibiotics and patient is without complaints today.  Complete antibiotic regimen as prescribed.  If symptoms worsen, contact the office immediately.    Dog bite  Tetanus vaccine is UTD (2019).  X-rays of the right forearm and right elbow were reviewed with patient in office today - BOTH NEGATIVE for foreign bodies.  Continue apply Mupirocin 2% ointment to each dog bite wound q.h.s..  Continue cleaning area with soap and water alone.  Return to ADLs.    Essential hypertension  Currently stable and well controlled.  Continue as is.    Chronic obstructive pulmonary disease, unspecified COPD type    Stage 3a chronic kidney disease      Follow up if symptoms worsen or fail to improve, for Cellulitis and dog bite wound.        6/30/2022 [place] PA-C

## 2022-08-04 ENCOUNTER — OFFICE VISIT (OUTPATIENT)
Dept: FAMILY MEDICINE | Facility: CLINIC | Age: 67
End: 2022-08-04
Payer: MEDICARE

## 2022-08-04 VITALS
DIASTOLIC BLOOD PRESSURE: 96 MMHG | SYSTOLIC BLOOD PRESSURE: 142 MMHG | WEIGHT: 263 LBS | HEIGHT: 63 IN | BODY MASS INDEX: 46.6 KG/M2 | HEART RATE: 82 BPM

## 2022-08-04 DIAGNOSIS — Z12.11 SPECIAL SCREENING FOR MALIGNANT NEOPLASMS, COLON: ICD-10-CM

## 2022-08-04 DIAGNOSIS — E66.01 OBESITY, CLASS III, BMI 40-49.9 (MORBID OBESITY): ICD-10-CM

## 2022-08-04 DIAGNOSIS — M79.7 FIBROMYALGIA: ICD-10-CM

## 2022-08-04 DIAGNOSIS — E66.01 MORBID OBESITY: ICD-10-CM

## 2022-08-04 DIAGNOSIS — J43.1 PANLOBULAR EMPHYSEMA: Primary | ICD-10-CM

## 2022-08-04 DIAGNOSIS — N18.31 STAGE 3A CHRONIC KIDNEY DISEASE: ICD-10-CM

## 2022-08-04 DIAGNOSIS — J45.909 ASTHMA, UNSPECIFIED ASTHMA SEVERITY, UNSPECIFIED WHETHER COMPLICATED, UNSPECIFIED WHETHER PERSISTENT: ICD-10-CM

## 2022-08-04 DIAGNOSIS — Z96.651 ARTIFICIAL KNEE JOINT PRESENT, RIGHT: ICD-10-CM

## 2022-08-04 DIAGNOSIS — Z78.0 MENOPAUSE: ICD-10-CM

## 2022-08-04 DIAGNOSIS — E78.5 DYSLIPIDEMIA: ICD-10-CM

## 2022-08-04 DIAGNOSIS — F51.01 PRIMARY INSOMNIA: ICD-10-CM

## 2022-08-04 DIAGNOSIS — I10 ESSENTIAL HYPERTENSION: ICD-10-CM

## 2022-08-04 DIAGNOSIS — M25.561 RIGHT KNEE PAIN, UNSPECIFIED CHRONICITY: ICD-10-CM

## 2022-08-04 DIAGNOSIS — F33.0 MILD EPISODE OF RECURRENT MAJOR DEPRESSIVE DISORDER: ICD-10-CM

## 2022-08-04 DIAGNOSIS — Z12.31 OTHER SCREENING MAMMOGRAM: ICD-10-CM

## 2022-08-04 DIAGNOSIS — M81.0 AGE-RELATED OSTEOPOROSIS WITHOUT CURRENT PATHOLOGICAL FRACTURE: ICD-10-CM

## 2022-08-04 DIAGNOSIS — Z13.820 ENCOUNTER FOR IMAGING TO ASSESS OSTEOPOROSIS: ICD-10-CM

## 2022-08-04 PROCEDURE — 3008F BODY MASS INDEX DOCD: CPT | Mod: CPTII,S$GLB,, | Performed by: FAMILY MEDICINE

## 2022-08-04 PROCEDURE — 1159F PR MEDICATION LIST DOCUMENTED IN MEDICAL RECORD: ICD-10-PCS | Mod: CPTII,S$GLB,, | Performed by: FAMILY MEDICINE

## 2022-08-04 PROCEDURE — 3077F PR MOST RECENT SYSTOLIC BLOOD PRESSURE >= 140 MM HG: ICD-10-PCS | Mod: CPTII,S$GLB,, | Performed by: FAMILY MEDICINE

## 2022-08-04 PROCEDURE — 1101F PR PT FALLS ASSESS DOC 0-1 FALLS W/OUT INJ PAST YR: ICD-10-PCS | Mod: CPTII,S$GLB,, | Performed by: FAMILY MEDICINE

## 2022-08-04 PROCEDURE — 3077F SYST BP >= 140 MM HG: CPT | Mod: CPTII,S$GLB,, | Performed by: FAMILY MEDICINE

## 2022-08-04 PROCEDURE — 99214 OFFICE O/P EST MOD 30 MIN: CPT | Mod: S$GLB,,, | Performed by: FAMILY MEDICINE

## 2022-08-04 PROCEDURE — 3008F PR BODY MASS INDEX (BMI) DOCUMENTED: ICD-10-PCS | Mod: CPTII,S$GLB,, | Performed by: FAMILY MEDICINE

## 2022-08-04 PROCEDURE — 1101F PT FALLS ASSESS-DOCD LE1/YR: CPT | Mod: CPTII,S$GLB,, | Performed by: FAMILY MEDICINE

## 2022-08-04 PROCEDURE — 1159F MED LIST DOCD IN RCRD: CPT | Mod: CPTII,S$GLB,, | Performed by: FAMILY MEDICINE

## 2022-08-04 PROCEDURE — 99214 PR OFFICE/OUTPT VISIT, EST, LEVL IV, 30-39 MIN: ICD-10-PCS | Mod: S$GLB,,, | Performed by: FAMILY MEDICINE

## 2022-08-04 PROCEDURE — 4010F ACE/ARB THERAPY RXD/TAKEN: CPT | Mod: CPTII,S$GLB,, | Performed by: FAMILY MEDICINE

## 2022-08-04 PROCEDURE — 3061F PR NEG MICROALBUMINURIA RESULT DOCUMENTED/REVIEW: ICD-10-PCS | Mod: CPTII,S$GLB,, | Performed by: FAMILY MEDICINE

## 2022-08-04 PROCEDURE — 3080F PR MOST RECENT DIASTOLIC BLOOD PRESSURE >= 90 MM HG: ICD-10-PCS | Mod: CPTII,S$GLB,, | Performed by: FAMILY MEDICINE

## 2022-08-04 PROCEDURE — 3066F PR DOCUMENTATION OF TREATMENT FOR NEPHROPATHY: ICD-10-PCS | Mod: CPTII,S$GLB,, | Performed by: FAMILY MEDICINE

## 2022-08-04 PROCEDURE — 3061F NEG MICROALBUMINURIA REV: CPT | Mod: CPTII,S$GLB,, | Performed by: FAMILY MEDICINE

## 2022-08-04 PROCEDURE — 3080F DIAST BP >= 90 MM HG: CPT | Mod: CPTII,S$GLB,, | Performed by: FAMILY MEDICINE

## 2022-08-04 PROCEDURE — 3288F PR FALLS RISK ASSESSMENT DOCUMENTED: ICD-10-PCS | Mod: CPTII,S$GLB,, | Performed by: FAMILY MEDICINE

## 2022-08-04 PROCEDURE — 3288F FALL RISK ASSESSMENT DOCD: CPT | Mod: CPTII,S$GLB,, | Performed by: FAMILY MEDICINE

## 2022-08-04 PROCEDURE — 4010F PR ACE/ARB THEARPY RXD/TAKEN: ICD-10-PCS | Mod: CPTII,S$GLB,, | Performed by: FAMILY MEDICINE

## 2022-08-04 PROCEDURE — 3066F NEPHROPATHY DOC TX: CPT | Mod: CPTII,S$GLB,, | Performed by: FAMILY MEDICINE

## 2022-08-04 RX ORDER — HYDROCODONE BITARTRATE AND ACETAMINOPHEN 10; 325 MG/1; MG/1
1 TABLET ORAL EVERY 8 HOURS PRN
Qty: 90 TABLET | Refills: 0 | Status: SHIPPED | OUTPATIENT
Start: 2022-09-03 | End: 2022-12-22

## 2022-08-04 RX ORDER — HYDROCODONE BITARTRATE AND ACETAMINOPHEN 10; 325 MG/1; MG/1
1 TABLET ORAL EVERY 8 HOURS PRN
Qty: 90 TABLET | Refills: 0 | Status: SHIPPED | OUTPATIENT
Start: 2022-10-04 | End: 2022-12-22

## 2022-08-04 RX ORDER — TRAZODONE HYDROCHLORIDE 50 MG/1
100 TABLET ORAL NIGHTLY PRN
Qty: 180 TABLET | Refills: 1 | Status: SHIPPED | OUTPATIENT
Start: 2022-08-04 | End: 2023-02-08 | Stop reason: SDUPTHER

## 2022-08-04 RX ORDER — HYDROCODONE BITARTRATE AND ACETAMINOPHEN 10; 325 MG/1; MG/1
1 TABLET ORAL EVERY 8 HOURS PRN
Qty: 90 TABLET | Refills: 0 | Status: SHIPPED | OUTPATIENT
Start: 2022-08-04 | End: 2022-12-22

## 2022-08-04 NOTE — PROGRESS NOTES
SUBJECTIVE:    Patient ID: Iris Mueller is a 66 y.o. female.    Chief Complaint: Low-back Pain and Knee Pain (No bottles // need refill // obstructive sleep // Mammogram and Dexa ordered //abc )    66 yr old here for 3 month checkup.  She states she gets COPD exacerbations with weather pressure changes.  She uses albuterol HFA occasionally she still smokes 1-3 cigarettes per day and occasionally drinks some alcohol.  She is able to get around in clean her house okay.    Right TKR-she walks with a cane.  She has severe arthritis in left knee.  Maintained on hydrocodone  t.i.d..    She had a stress test a few years ago prior to her TKR    CKD 3-GFR 45.     She has been sent  Cologuard test every year for 3 years and she has failed to do any of them.  She has not had a colonoscopy recently.      No visits with results within 6 Month(s) from this visit.   Latest known visit with results is:   Office Visit on 01/25/2022   Component Date Value Ref Range Status    WBC 04/25/2022 5.6  3.8 - 10.8 Thousand/uL Final    RBC 04/25/2022 4.16  3.80 - 5.10 Million/uL Final    Hemoglobin 04/25/2022 12.7  11.7 - 15.5 g/dL Final    Hematocrit 04/25/2022 39.7  35.0 - 45.0 % Final    MCV 04/25/2022 95.4  80.0 - 100.0 fL Final    MCH 04/25/2022 30.5  27.0 - 33.0 pg Final    MCHC 04/25/2022 32.0  32.0 - 36.0 g/dL Final    RDW 04/25/2022 13.8  11.0 - 15.0 % Final    Platelets 04/25/2022 297  140 - 400 Thousand/uL Final    MPV 04/25/2022 9.6  7.5 - 12.5 fL Final    Neutrophils, Abs 04/25/2022 3,987  1,500 - 7,800 cells/uL Final    Lymph # 04/25/2022 974  850 - 3,900 cells/uL Final    Mono # 04/25/2022 398  200 - 950 cells/uL Final    Eos # 04/25/2022 202  15 - 500 cells/uL Final    Baso # 04/25/2022 39  0 - 200 cells/uL Final    Neutrophils Relative 04/25/2022 71.2  % Final    Lymph % 04/25/2022 17.4  % Final    Mono % 04/25/2022 7.1  % Final    Eosinophil % 04/25/2022 3.6  % Final    Basophil %  04/25/2022 0.7  % Final    Glucose 04/25/2022 76  65 - 99 mg/dL Final    BUN 04/25/2022 17  7 - 25 mg/dL Final    Creatinine 04/25/2022 1.23 (A) 0.50 - 0.99 mg/dL Final    eGFR if non African American 04/25/2022 45 (A) > OR = 60 mL/min/1.73m2 Final    eGFR if  04/25/2022 52 (A) > OR = 60 mL/min/1.73m2 Final    BUN/Creatinine Ratio 04/25/2022 14  6 - 22 (calc) Final    Sodium 04/25/2022 138  135 - 146 mmol/L Final    Potassium 04/25/2022 4.0  3.5 - 5.3 mmol/L Final    Chloride 04/25/2022 103  98 - 110 mmol/L Final    CO2 04/25/2022 29  20 - 32 mmol/L Final    Calcium 04/25/2022 9.3  8.6 - 10.4 mg/dL Final    Total Protein 04/25/2022 6.3  6.1 - 8.1 g/dL Final    Albumin 04/25/2022 4.0  3.6 - 5.1 g/dL Final    Globulin, Total 04/25/2022 2.3  1.9 - 3.7 g/dL (calc) Final    Albumin/Globulin Ratio 04/25/2022 1.7  1.0 - 2.5 (calc) Final    Total Bilirubin 04/25/2022 0.5  0.2 - 1.2 mg/dL Final    Alkaline Phosphatase 04/25/2022 85  37 - 153 U/L Final    AST 04/25/2022 10  10 - 35 U/L Final    ALT 04/25/2022 7  6 - 29 U/L Final    Cholesterol 04/25/2022 151  <200 mg/dL Final    HDL 04/25/2022 50  > OR = 50 mg/dL Final    Triglycerides 04/25/2022 107  <150 mg/dL Final    LDL Cholesterol 04/25/2022 81  mg/dL (calc) Final    HDL/Cholesterol Ratio 04/25/2022 3.0  <5.0 (calc) Final    Non HDL Chol. (LDL+VLDL) 04/25/2022 101  <130 mg/dL (calc) Final    Creatinine, Urine 01/25/2022 25  20 - 275 mg/dL Final    Microalb, Ur 01/25/2022 0.2  See Note: mg/dL Final    Microalb/Creat Ratio 01/25/2022 8  <30 mcg/mg creat Final    Color, UA 01/25/2022 YELLOW  YELLOW Final    Appearance, UA 01/25/2022 CLEAR  CLEAR Final    Specific Gravity, UA 01/25/2022 1.007  1.001 - 1.035 Final    pH, UA 01/25/2022 6.5  5.0 - 8.0 Final    Glucose, UA 01/25/2022 NEGATIVE  NEGATIVE Final    Bilirubin, UA 01/25/2022 NEGATIVE  NEGATIVE Final    Ketones, UA 01/25/2022 NEGATIVE  NEGATIVE Final    Occult Blood  UA 01/25/2022 NEGATIVE  NEGATIVE Final    Protein, UA 01/25/2022 NEGATIVE  NEGATIVE Final    Nitrite, UA 01/25/2022 NEGATIVE  NEGATIVE Final    Leukocytes, UA 01/25/2022 NEGATIVE  NEGATIVE Final    WBC Casts, UA 01/25/2022 NONE SEEN  < OR = 5 /HPF Final    RBC Casts, UA 01/25/2022 NONE SEEN  < OR = 2 /HPF Final    Squam Epithel, UA 01/25/2022 NONE SEEN  < OR = 5 /HPF Final    Bacteria, UA 01/25/2022 NONE SEEN  NONE SEEN /HPF Final    Hyaline Casts, UA 01/25/2022 NONE SEEN  NONE SEEN /LPF Final    Reflexive Urine Culture 01/25/2022    Final    TSH w/reflex to FT4 04/25/2022 1.49  0.40 - 4.50 mIU/L Final    Amphetamines 01/25/2022 NEGATIVE  <500 ng/mL Final    Barbiturates 01/25/2022 NEGATIVE  <300 ng/mL Final    Benzodiazepines 01/25/2022 NEGATIVE  <100 ng/mL Final    Cocaine Metabolites 01/25/2022 NEGATIVE  <150 ng/mL Final    Methadone 01/25/2022 NEGATIVE  <100 ng/mL Final    Opiates 01/25/2022 POSITIVE (A) <100 ng/mL Final    Codeine 01/25/2022 NEGATIVE  <50 ng/mL Final    Hydrocodone 01/25/2022 90 (A) <50 ng/mL Final    Hydromorphone 01/25/2022 NEGATIVE  <50 ng/mL Final    Morphine 01/25/2022 NEGATIVE  <50 ng/mL Final    NORHYDROCODONE 01/25/2022 77 (A) <50 ng/mL Final    Opiates Comments 01/25/2022    Final    Oxycodone 01/25/2022 NEGATIVE  <100 ng/mL Final    Phencyclidine 01/25/2022 NEGATIVE  <25 ng/mL Final    Creatinine 01/25/2022 24.4  > or = 20.0 mg/dL Final    pH 01/25/2022 6.4  4.5 - 9.0 Final    Oxidants, Urine (Tox) 01/25/2022 NEGATIVE  <200 mcg/mL Final    Notes and Comments 01/25/2022    Final       Past Medical History:   Diagnosis Date    Arthritis     Asthma     Encounter for blood transfusion     Hypertension     Wound infection 08/2019    Right knee     Social History     Socioeconomic History    Marital status:    Tobacco Use    Smoking status: Light Tobacco Smoker     Packs/day: 0.50     Years: 30.00     Pack years: 15.00     Types: Cigarettes     Smokeless tobacco: Never Used   Substance and Sexual Activity    Alcohol use: Yes     Comment: RARELY    Drug use: Never    Sexual activity: Not Currently     Past Surgical History:   Procedure Laterality Date     SECTION      JOINT REPLACEMENT      Knee    KNEE ARTHROPLASTY Right 2019    Procedure: ARTHROPLASTY, KNEE;  Surgeon: Delio Chung MD;  Location: [facility] OR;  Service: Orthopedics;  Laterality: Right;  anesthesia:  general and block    REPLACEMENT OF WOUND VACUUM-ASSISTED CLOSURE DEVICE Right 2019    Procedure: REPLACEMENT, WOUND VAC;  Surgeon: Delio Chung MD;  Location: [facility] OR;  Service: Orthopedics;  Laterality: Right;    TONSILLECTOMY       Family History   Problem Relation Age of Onset    Alzheimer's disease Mother        Review of patient's allergies indicates:  No Known Allergies    Current Outpatient Medications:     albuterol (PROVENTIL/VENTOLIN HFA) 90 mcg/actuation inhaler, Inhale 2 puffs into the lungs every 6 (six) hours as needed for Wheezing. Rescue, Disp: 18 g, Rfl: 2    ANORO ELLIPTA 62.5-25 mcg/actuation DsDv, Inhale 1 puff into the lungs once daily., Disp: 60 each, Rfl: 5    atorvastatin (LIPITOR) 10 MG tablet, Take 1 tablet (10 mg total) by mouth every evening., Disp: 90 tablet, Rfl: 1    betamethasone dipropionate 0.05 % cream, Apply topically 2 (two) times daily., Disp: 45 g, Rfl: 2    buPROPion (WELLBUTRIN SR) 150 MG TBSR 12 hr tablet, Take 1 tablet (150 mg total) by mouth 2 (two) times daily., Disp: 180 tablet, Rfl: 1    cetirizine HCl/pseudoephedrine (ZYRTEC-D ORAL), Take by mouth., Disp: , Rfl:     ferrous sulfate (FEOSOL) 325 mg (65 mg iron) Tab tablet, Take 1 tablet (325 mg total) by mouth once daily., Disp: , Rfl: 0    FLUoxetine 40 MG capsule, Take 1 capsule (40 mg total) by mouth once daily., Disp: 90 capsule, Rfl: 1    fluticasone propionate (FLONASE) 50 mcg/actuation nasal spray, 1 spray (50 mcg total) by Each Nostril route once  daily., Disp: 16 g, Rfl: 2    gabapentin (NEURONTIN) 300 MG capsule, Take 1 capsule (300 mg total) by mouth 3 (three) times daily., Disp: 270 capsule, Rfl: 1    levoFLOXacin (LEVAQUIN) 750 MG tablet, Take 750 mg by mouth once daily., Disp: , Rfl:     lisinopriL 10 MG tablet, Take 1 tablet (10 mg total) by mouth once daily., Disp: 90 tablet, Rfl: 1    loratadine (CLARITIN) 10 mg tablet, Take 10 mg by mouth once daily., Disp: , Rfl:     mupirocin (BACTROBAN) 2 % ointment, Apply topically., Disp: , Rfl:     mupirocin (BACTROBAN) 2 % ointment, Apply topically 3 (three) times daily., Disp: 30 g, Rfl: 0    triamcinolone acetonide 0.1% (KENALOG) 0.1 % cream, Apply topically 2 (two) times daily., Disp: 45 g, Rfl: 2    HYDROcodone-acetaminophen (NORCO)  mg per tablet, Take 1 tablet by mouth every 8 (eight) hours as needed for Pain., Disp: 90 tablet, Rfl: 0    HYDROcodone-acetaminophen (NORCO)  mg per tablet, Take 1 tablet by mouth every 8 (eight) hours as needed for Pain., Disp: 90 tablet, Rfl: 0    HYDROcodone-acetaminophen (NORCO)  mg per tablet, Take 1 tablet by mouth every 8 (eight) hours as needed for Pain., Disp: 90 tablet, Rfl: 0    [START ON 10/4/2022] HYDROcodone-acetaminophen (NORCO)  mg per tablet, Take 1 tablet by mouth every 8 (eight) hours as needed for Pain., Disp: 90 tablet, Rfl: 0    [START ON 9/3/2022] HYDROcodone-acetaminophen (NORCO)  mg per tablet, Take 1 tablet by mouth every 8 (eight) hours as needed for Pain., Disp: 90 tablet, Rfl: 0    HYDROcodone-acetaminophen (NORCO)  mg per tablet, Take 1 tablet by mouth every 8 (eight) hours as needed for Pain., Disp: 90 tablet, Rfl: 0    traZODone (DESYREL) 50 MG tablet, Take 2 tablets (100 mg total) by mouth nightly as needed for Insomnia., Disp: 180 tablet, Rfl: 1  No current facility-administered medications for this visit.    Facility-Administered Medications Ordered in Other Visits:     lidocaine (PF) 10  "mg/ml (1%) injection 5 mg, 0.5 mL, Intradermal, Once, Azeem Anderson MD    Review of Systems   Constitutional: Negative for appetite change, chills, fatigue, fever and unexpected weight change.   HENT: Negative for congestion, ear pain, sinus pain, sore throat and trouble swallowing.    Eyes: Negative for pain, discharge and visual disturbance.   Respiratory: Positive for shortness of breath and wheezing. Negative for apnea and cough.    Cardiovascular: Negative for chest pain, palpitations and leg swelling.   Gastrointestinal: Negative for abdominal pain, blood in stool, constipation, diarrhea, nausea and vomiting.   Endocrine: Negative for heat intolerance, polydipsia and polyuria.   Genitourinary: Negative for difficulty urinating, dyspareunia, dysuria, frequency, hematuria and menstrual problem.   Musculoskeletal: Positive for arthralgias. Negative for back pain, gait problem, joint swelling and myalgias.   Allergic/Immunologic: Negative for environmental allergies, food allergies and immunocompromised state.   Neurological: Negative for dizziness, tremors, seizures, numbness and headaches.   Psychiatric/Behavioral: Positive for sleep disturbance. Negative for behavioral problems, confusion, hallucinations and suicidal ideas. The patient is not nervous/anxious.           Objective:      Vitals:    08/04/22 0935 08/04/22 0946   BP: (!) 144/92 (!) 142/96   Pulse: 82    Weight: 119.3 kg (263 lb)    Height: 5' 3" (1.6 m)      Physical Exam  Vitals and nursing note reviewed.   Constitutional:       General: She is not in acute distress.     Appearance: She is well-developed. She is obese. She is not toxic-appearing.   HENT:      Head: Normocephalic and atraumatic.      Right Ear: Tympanic membrane and external ear normal.      Left Ear: Tympanic membrane and external ear normal.      Nose: Nose normal.      Mouth/Throat:      Pharynx: Oropharynx is clear.   Eyes:      Pupils: Pupils are equal, round, and reactive " to light.   Neck:      Thyroid: No thyromegaly.      Vascular: No carotid bruit.   Cardiovascular:      Rate and Rhythm: Normal rate and regular rhythm.      Heart sounds: Normal heart sounds. No murmur heard.  Pulmonary:      Effort: Pulmonary effort is normal.      Breath sounds: Normal breath sounds. No wheezing or rales.   Abdominal:      General: Bowel sounds are normal. There is no distension.      Palpations: Abdomen is soft.      Tenderness: There is no abdominal tenderness.   Musculoskeletal:         General: No tenderness or deformity. Normal range of motion.      Cervical back: Normal range of motion and neck supple.      Lumbar back: Normal. No spasms.      Comments: Bends 90 degrees at  waist right TKR good range of motion left knee very crepitant.  No pitting edema to lower extremities.   Lymphadenopathy:      Cervical: No cervical adenopathy.   Skin:     General: Skin is warm and dry.      Findings: No rash.   Neurological:      Mental Status: She is alert and oriented to person, place, and time.      Cranial Nerves: No cranial nerve deficit.      Coordination: Coordination normal.   Psychiatric:         Behavior: Behavior normal.         Thought Content: Thought content normal.         Judgment: Judgment normal.           Assessment:       1. Panlobular emphysema    2. Age-related osteoporosis without current pathological fracture    3. Artificial knee joint present, right    4. Fibromyalgia    5. Asthma, unspecified asthma severity, unspecified whether complicated, unspecified whether persistent    6. Primary insomnia    7. Other screening mammogram    8. Encounter for imaging to assess osteoporosis    9. Menopause    10. Special screening for malignant neoplasms, colon    11. Essential hypertension    12. Right knee pain, unspecified chronicity    13. Mild episode of recurrent major depressive disorder    14. Dyslipidemia    15. Stage 3a chronic kidney disease    16. Morbid obesity    17. Obesity,  Class III, BMI 40-49.9 (morbid obesity)         Plan:       Panlobular emphysema  Continue inhalers as is.  Age-related osteoporosis without current pathological fracture    Artificial knee joint present, right  Doing well currently,  refer to orthopedics for left knee injections if desired.  Fibromyalgia  -     HYDROcodone-acetaminophen (NORCO)  mg per tablet; Take 1 tablet by mouth every 8 (eight) hours as needed for Pain.  Dispense: 90 tablet; Refill: 0  -     HYDROcodone-acetaminophen (NORCO)  mg per tablet; Take 1 tablet by mouth every 8 (eight) hours as needed for Pain.  Dispense: 90 tablet; Refill: 0  -     HYDROcodone-acetaminophen (NORCO)  mg per tablet; Take 1 tablet by mouth every 8 (eight) hours as needed for Pain.  Dispense: 90 tablet; Refill: 0  Refill pain medication  Asthma, unspecified asthma severity, unspecified whether complicated, unspecified whether persistent    Primary insomnia  Comments:  stable on med  Orders:  -     traZODone (DESYREL) 50 MG tablet; Take 2 tablets (100 mg total) by mouth nightly as needed for Insomnia.  Dispense: 180 tablet; Refill: 1  Increase trazodone to 100 mg HS for better sleep.  Other screening mammogram  -     Mammo Digital Screening Bilat; Future; Expected date: 08/04/2022    Encounter for imaging to assess osteoporosis  -     DXA Bone Density Spine And Hip; Future; Expected date: 08/04/2022    Menopause  -     DXA Bone Density Spine And Hip; Future; Expected date: 08/04/2022    Special screening for malignant neoplasms, colon  -     Cologuard Screening (Multitarget Stool DNA); Future; Expected date: 08/04/2022    Essential hypertension  -     CBC Auto Differential; Future; Expected date: 08/04/2022  -     Comprehensive Metabolic Panel; Future; Expected date: 08/04/2022  -     Lipid Panel; Future; Expected date: 08/04/2022  Return in 3 months with lab work.  Blood pressure mild elevation  Right knee pain, unspecified chronicity  -      HYDROcodone-acetaminophen (NORCO)  mg per tablet; Take 1 tablet by mouth every 8 (eight) hours as needed for Pain.  Dispense: 90 tablet; Refill: 0  -     HYDROcodone-acetaminophen (NORCO)  mg per tablet; Take 1 tablet by mouth every 8 (eight) hours as needed for Pain.  Dispense: 90 tablet; Refill: 0  -     HYDROcodone-acetaminophen (NORCO)  mg per tablet; Take 1 tablet by mouth every 8 (eight) hours as needed for Pain.  Dispense: 90 tablet; Refill: 0    Mild episode of recurrent major depressive disorder    Dyslipidemia    Stage 3a chronic kidney disease  GFR 45 recheck in 3 months  Morbid obesity    Obesity, Class III, BMI 40-49.9 (morbid obesity)      Follow up in about 3 months (around 11/4/2022), or [facility].        8/6/2022 Elkin You

## 2022-08-06 PROBLEM — F51.01 PRIMARY INSOMNIA: Status: ACTIVE | Noted: 2022-08-06

## 2022-08-12 ENCOUNTER — TELEPHONE (OUTPATIENT)
Dept: FAMILY MEDICINE | Facility: CLINIC | Age: 67
End: 2022-08-12

## 2022-08-12 NOTE — TELEPHONE ENCOUNTER
----- Message from Katia Tang sent at 8/12/2022 10:33 AM CDT -----  Luisana calling from [email or web address]- They have two cologuard orders on file for the patient. Luisana is calling to confirm that they can cancel one of the orders as a duplicate. Luisana's callback number is [phone]. Case number is [id number]

## 2022-08-12 NOTE — TELEPHONE ENCOUNTER
Spoke with [employer] and confirmed one is duplicate, they are cancelling the one from October 2021

## 2022-11-08 ENCOUNTER — OFFICE VISIT (OUTPATIENT)
Dept: FAMILY MEDICINE | Facility: CLINIC | Age: 67
End: 2022-11-08
Payer: MEDICARE

## 2022-11-08 VITALS
SYSTOLIC BLOOD PRESSURE: 134 MMHG | WEIGHT: 268.19 LBS | BODY MASS INDEX: 47.52 KG/M2 | HEART RATE: 75 BPM | DIASTOLIC BLOOD PRESSURE: 74 MMHG | OXYGEN SATURATION: 95 % | HEIGHT: 63 IN

## 2022-11-08 DIAGNOSIS — E66.01 MORBID OBESITY: ICD-10-CM

## 2022-11-08 DIAGNOSIS — I10 ESSENTIAL HYPERTENSION: Primary | ICD-10-CM

## 2022-11-08 DIAGNOSIS — F33.0 MILD EPISODE OF RECURRENT MAJOR DEPRESSIVE DISORDER: ICD-10-CM

## 2022-11-08 DIAGNOSIS — N18.32 STAGE 3B CHRONIC KIDNEY DISEASE: ICD-10-CM

## 2022-11-08 DIAGNOSIS — J43.1 PANLOBULAR EMPHYSEMA: ICD-10-CM

## 2022-11-08 DIAGNOSIS — E78.5 DYSLIPIDEMIA: ICD-10-CM

## 2022-11-08 DIAGNOSIS — Z23 FLU VACCINE NEED: ICD-10-CM

## 2022-11-08 DIAGNOSIS — M17.0 PRIMARY OSTEOARTHRITIS OF BOTH KNEES: ICD-10-CM

## 2022-11-08 DIAGNOSIS — F17.210 NICOTINE DEPENDENCE, CIGARETTES, UNCOMPLICATED: ICD-10-CM

## 2022-11-08 LAB
ALBUMIN SERPL-MCNC: 4 G/DL (ref 3.6–5.1)
ALBUMIN/GLOB SERPL: 1.6 (CALC) (ref 1–2.5)
ALP SERPL-CCNC: 74 U/L (ref 37–153)
ALT SERPL-CCNC: 5 U/L (ref 6–29)
AST SERPL-CCNC: 9 U/L (ref 10–35)
BASOPHILS # BLD AUTO: 29 CELLS/UL (ref 0–200)
BASOPHILS NFR BLD AUTO: 0.6 %
BILIRUB SERPL-MCNC: 0.4 MG/DL (ref 0.2–1.2)
BUN SERPL-MCNC: 20 MG/DL (ref 7–25)
BUN/CREAT SERPL: 14 (CALC) (ref 6–22)
CALCIUM SERPL-MCNC: 9.2 MG/DL (ref 8.6–10.4)
CHLORIDE SERPL-SCNC: 104 MMOL/L (ref 98–110)
CHOLEST SERPL-MCNC: 162 MG/DL
CHOLEST/HDLC SERPL: 2.6 (CALC)
CO2 SERPL-SCNC: 29 MMOL/L (ref 20–32)
CREAT SERPL-MCNC: 1.41 MG/DL (ref 0.5–1.05)
EGFR: 40 ML/MIN/1.73M2
EOSINOPHIL # BLD AUTO: 259 CELLS/UL (ref 15–500)
EOSINOPHIL NFR BLD AUTO: 5.4 %
ERYTHROCYTE [DISTWIDTH] IN BLOOD BY AUTOMATED COUNT: 14.1 % (ref 11–15)
GLOBULIN SER CALC-MCNC: 2.5 G/DL (CALC) (ref 1.9–3.7)
GLUCOSE SERPL-MCNC: 80 MG/DL (ref 65–99)
HCT VFR BLD AUTO: 38.9 % (ref 35–45)
HDLC SERPL-MCNC: 63 MG/DL
HGB BLD-MCNC: 12.5 G/DL (ref 11.7–15.5)
LDLC SERPL CALC-MCNC: 82 MG/DL (CALC)
LYMPHOCYTES # BLD AUTO: 1123 CELLS/UL (ref 850–3900)
LYMPHOCYTES NFR BLD AUTO: 23.4 %
MCH RBC QN AUTO: 30.5 PG (ref 27–33)
MCHC RBC AUTO-ENTMCNC: 32.1 G/DL (ref 32–36)
MCV RBC AUTO: 94.9 FL (ref 80–100)
MONOCYTES # BLD AUTO: 350 CELLS/UL (ref 200–950)
MONOCYTES NFR BLD AUTO: 7.3 %
NEUTROPHILS # BLD AUTO: 3038 CELLS/UL (ref 1500–7800)
NEUTROPHILS NFR BLD AUTO: 63.3 %
NONHDLC SERPL-MCNC: 99 MG/DL (CALC)
PLATELET # BLD AUTO: 258 THOUSAND/UL (ref 140–400)
PMV BLD REES-ECKER: 9.6 FL (ref 7.5–12.5)
POTASSIUM SERPL-SCNC: 4.3 MMOL/L (ref 3.5–5.3)
PROT SERPL-MCNC: 6.5 G/DL (ref 6.1–8.1)
RBC # BLD AUTO: 4.1 MILLION/UL (ref 3.8–5.1)
SODIUM SERPL-SCNC: 139 MMOL/L (ref 135–146)
TRIGL SERPL-MCNC: 86 MG/DL
WBC # BLD AUTO: 4.8 THOUSAND/UL (ref 3.8–10.8)

## 2022-11-08 PROCEDURE — 1159F MED LIST DOCD IN RCRD: CPT | Mod: CPTII,S$GLB,, | Performed by: NURSE PRACTITIONER

## 2022-11-08 PROCEDURE — 3288F PR FALLS RISK ASSESSMENT DOCUMENTED: ICD-10-PCS | Mod: CPTII,S$GLB,, | Performed by: NURSE PRACTITIONER

## 2022-11-08 PROCEDURE — 3066F PR DOCUMENTATION OF TREATMENT FOR NEPHROPATHY: ICD-10-PCS | Mod: CPTII,S$GLB,, | Performed by: NURSE PRACTITIONER

## 2022-11-08 PROCEDURE — 1101F PT FALLS ASSESS-DOCD LE1/YR: CPT | Mod: CPTII,S$GLB,, | Performed by: NURSE PRACTITIONER

## 2022-11-08 PROCEDURE — 1160F RVW MEDS BY RX/DR IN RCRD: CPT | Mod: CPTII,S$GLB,, | Performed by: NURSE PRACTITIONER

## 2022-11-08 PROCEDURE — 99214 PR OFFICE/OUTPT VISIT, EST, LEVL IV, 30-39 MIN: ICD-10-PCS | Mod: 25,S$GLB,, | Performed by: NURSE PRACTITIONER

## 2022-11-08 PROCEDURE — 3075F PR MOST RECENT SYSTOLIC BLOOD PRESS GE 130-139MM HG: ICD-10-PCS | Mod: CPTII,S$GLB,, | Performed by: NURSE PRACTITIONER

## 2022-11-08 PROCEDURE — 3008F PR BODY MASS INDEX (BMI) DOCUMENTED: ICD-10-PCS | Mod: CPTII,S$GLB,, | Performed by: NURSE PRACTITIONER

## 2022-11-08 PROCEDURE — 99214 OFFICE O/P EST MOD 30 MIN: CPT | Mod: 25,S$GLB,, | Performed by: NURSE PRACTITIONER

## 2022-11-08 PROCEDURE — 3078F PR MOST RECENT DIASTOLIC BLOOD PRESSURE < 80 MM HG: ICD-10-PCS | Mod: CPTII,S$GLB,, | Performed by: NURSE PRACTITIONER

## 2022-11-08 PROCEDURE — 3061F PR NEG MICROALBUMINURIA RESULT DOCUMENTED/REVIEW: ICD-10-PCS | Mod: CPTII,S$GLB,, | Performed by: NURSE PRACTITIONER

## 2022-11-08 PROCEDURE — 1101F PR PT FALLS ASSESS DOC 0-1 FALLS W/OUT INJ PAST YR: ICD-10-PCS | Mod: CPTII,S$GLB,, | Performed by: NURSE PRACTITIONER

## 2022-11-08 PROCEDURE — 1160F PR REVIEW ALL MEDS BY PRESCRIBER/CLIN PHARMACIST DOCUMENTED: ICD-10-PCS | Mod: CPTII,S$GLB,, | Performed by: NURSE PRACTITIONER

## 2022-11-08 PROCEDURE — 3075F SYST BP GE 130 - 139MM HG: CPT | Mod: CPTII,S$GLB,, | Performed by: NURSE PRACTITIONER

## 2022-11-08 PROCEDURE — 3066F NEPHROPATHY DOC TX: CPT | Mod: CPTII,S$GLB,, | Performed by: NURSE PRACTITIONER

## 2022-11-08 PROCEDURE — 3008F BODY MASS INDEX DOCD: CPT | Mod: CPTII,S$GLB,, | Performed by: NURSE PRACTITIONER

## 2022-11-08 PROCEDURE — 3078F DIAST BP <80 MM HG: CPT | Mod: CPTII,S$GLB,, | Performed by: NURSE PRACTITIONER

## 2022-11-08 PROCEDURE — G0008 FLU VACCINE - QUADRIVALENT - HIGH DOSE (65+) PRESERVATIVE FREE IM: ICD-10-PCS | Mod: S$GLB,,, | Performed by: NURSE PRACTITIONER

## 2022-11-08 PROCEDURE — 90662 FLU VACCINE - QUADRIVALENT - HIGH DOSE (65+) PRESERVATIVE FREE IM: ICD-10-PCS | Mod: S$GLB,,, | Performed by: NURSE PRACTITIONER

## 2022-11-08 PROCEDURE — 1159F PR MEDICATION LIST DOCUMENTED IN MEDICAL RECORD: ICD-10-PCS | Mod: CPTII,S$GLB,, | Performed by: NURSE PRACTITIONER

## 2022-11-08 PROCEDURE — 4010F ACE/ARB THERAPY RXD/TAKEN: CPT | Mod: CPTII,S$GLB,, | Performed by: NURSE PRACTITIONER

## 2022-11-08 PROCEDURE — 3061F NEG MICROALBUMINURIA REV: CPT | Mod: CPTII,S$GLB,, | Performed by: NURSE PRACTITIONER

## 2022-11-08 PROCEDURE — G0008 ADMIN INFLUENZA VIRUS VAC: HCPCS | Mod: S$GLB,,, | Performed by: NURSE PRACTITIONER

## 2022-11-08 PROCEDURE — 4010F PR ACE/ARB THEARPY RXD/TAKEN: ICD-10-PCS | Mod: CPTII,S$GLB,, | Performed by: NURSE PRACTITIONER

## 2022-11-08 PROCEDURE — 90662 IIV NO PRSV INCREASED AG IM: CPT | Mod: S$GLB,,, | Performed by: NURSE PRACTITIONER

## 2022-11-08 PROCEDURE — 3288F FALL RISK ASSESSMENT DOCD: CPT | Mod: CPTII,S$GLB,, | Performed by: NURSE PRACTITIONER

## 2022-11-08 RX ORDER — BUPROPION HYDROCHLORIDE 150 MG/1
150 TABLET, EXTENDED RELEASE ORAL 2 TIMES DAILY
Qty: 180 TABLET | Refills: 1 | Status: SHIPPED | OUTPATIENT
Start: 2022-11-08 | End: 2023-06-29 | Stop reason: SDUPTHER

## 2022-11-08 RX ORDER — UMECLIDINIUM BROMIDE AND VILANTEROL TRIFENATATE 62.5; 25 UG/1; UG/1
1 POWDER RESPIRATORY (INHALATION) DAILY
Qty: 60 EACH | Refills: 5 | Status: SHIPPED | OUTPATIENT
Start: 2022-11-08 | End: 2023-08-09

## 2022-11-08 RX ORDER — ATORVASTATIN CALCIUM 10 MG/1
10 TABLET, FILM COATED ORAL NIGHTLY
Qty: 90 TABLET | Refills: 1 | Status: SHIPPED | OUTPATIENT
Start: 2022-11-08 | End: 2023-05-08 | Stop reason: SDUPTHER

## 2022-11-08 NOTE — PROGRESS NOTES
SUBJECTIVE:    Patient ID: Iris Mueller is a 67 y.o. female.    Chief Complaint: Hypertension (No bottles//Pt is here for a 3 month check up and possible medication refills//Pt would like her flu vaccine//Orders for her [place], mammo and Dexa done on 08/2022, but not done//JAIME )    Pt here for regular f/u- HTN/COPD/OA  Pt reports overall she's doing okay. C/o's of chronic fatigue.  Reports  has continued with some health concerns, recently fell and broke his ribs which has her worried.  Pt reports overall breathing has been okay, admits only uses anoro and albuterol occasionally, usually when the weather changes.  Reports only smoking 2-5 cigs/day.  Continues with chronic knee pain, denies any recent falls.  Still has not collected cologuard stool test or scheduled mammo/ DEXA which was ordered at last visit      Office Visit on 08/04/2022   Component Date Value Ref Range Status    WBC 11/07/2022 4.8  3.8 - 10.8 Thousand/uL Final    RBC 11/07/2022 4.10  3.80 - 5.10 Million/uL Final    Hemoglobin 11/07/2022 12.5  11.7 - 15.5 g/dL Final    Hematocrit 11/07/2022 38.9  35.0 - 45.0 % Final    MCV 11/07/2022 94.9  80.0 - 100.0 fL Final    MCH 11/07/2022 30.5  27.0 - 33.0 pg Final    MCHC 11/07/2022 32.1  32.0 - 36.0 g/dL Final    RDW 11/07/2022 14.1  11.0 - 15.0 % Final    Platelets 11/07/2022 258  140 - 400 Thousand/uL Final    MPV 11/07/2022 9.6  7.5 - 12.5 fL Final    Neutrophils, Abs 11/07/2022 3,038  1,500 - 7,800 cells/uL Final    Lymph # 11/07/2022 1,123  850 - 3,900 cells/uL Final    Mono # 11/07/2022 350  200 - 950 cells/uL Final    Eos # 11/07/2022 259  15 - 500 cells/uL Final    Baso # 11/07/2022 29  0 - 200 cells/uL Final    Neutrophils Relative 11/07/2022 63.3  % Final    Lymph % 11/07/2022 23.4  % Final    Mono % 11/07/2022 7.3  % Final    Eosinophil % 11/07/2022 5.4  % Final    Basophil % 11/07/2022 0.6  % Final    Glucose 11/07/2022 80  65 - 99 mg/dL Final    BUN 11/07/2022 20  7 - 25  mg/dL Final    Creatinine 2022 1.41 (H)  0.50 - 1.05 mg/dL Final    eGFR 2022 40 (L)  > OR = 60 mL/min/1.73m2 Final    BUN/Creatinine Ratio 2022 14  6 - 22 (calc) Final    Sodium 2022 139  135 - 146 mmol/L Final    Potassium 2022 4.3  3.5 - 5.3 mmol/L Final    Chloride 2022 104  98 - 110 mmol/L Final    CO2 2022 29  20 - 32 mmol/L Final    Calcium 2022 9.2  8.6 - 10.4 mg/dL Final    Total Protein 2022 6.5  6.1 - 8.1 g/dL Final    Albumin 2022 4.0  3.6 - 5.1 g/dL Final    Globulin, Total 2022 2.5  1.9 - 3.7 g/dL (calc) Final    Albumin/Globulin Ratio 2022 1.6  1.0 - 2.5 (calc) Final    Total Bilirubin 2022 0.4  0.2 - 1.2 mg/dL Final    Alkaline Phosphatase 2022 74  37 - 153 U/L Final    AST 2022 9 (L)  10 - 35 U/L Final    ALT 2022 5 (L)  6 - 29 U/L Final    Cholesterol 2022 162  <200 mg/dL Final    HDL 2022 63  > OR = 50 mg/dL Final    Triglycerides 2022 86  <150 mg/dL Final    LDL Cholesterol 2022 82  mg/dL (calc) Final    HDL/Cholesterol Ratio 2022 2.6  <5.0 (calc) Final    Non HDL Chol. (LDL+VLDL) 2022 99  <130 mg/dL (calc) Final       Past Medical History:   Diagnosis Date    Arthritis     Asthma     COPD (chronic obstructive pulmonary disease)     Encounter for blood transfusion     Hypertension     Wound infection 2019    Right knee     Past Surgical History:   Procedure Laterality Date     SECTION      JOINT REPLACEMENT      Knee    KNEE ARTHROPLASTY Right 2019    Procedure: ARTHROPLASTY, KNEE;  Surgeon: Delio Chung MD;  Location: [facility];  Service: Orthopedics;  Laterality: Right;  anesthesia:  general and block    REPLACEMENT OF WOUND VACUUM-ASSISTED CLOSURE DEVICE Right 2019    Procedure: REPLACEMENT, WOUND VAC;  Surgeon: Delio Chung MD;  Location: [facility];  Service: Orthopedics;  Laterality: Right;    TONSILLECTOMY       Family History    Problem Relation Age of Onset    Alzheimer's disease Mother        The 10-year CVD risk score (SUSU'Agostino, et al., 2008) is: 16.2%    Values used to calculate the score:      Age: 67 years      Sex: Female      Diabetic: No      Tobacco smoker: Yes      Systolic Blood Pressure: 134 mmHg      Is BP treated: Yes      HDL Cholesterol: 63 mg/dL      Total Cholesterol: 162 mg/dL     Marital Status:   Alcohol History:  reports current alcohol use.  Tobacco History:  reports that she has been smoking cigarettes. She has a 15.00 pack-year smoking history. She has never used smokeless tobacco.  Drug History:  reports no history of drug use.    Health Maintenance Topics with due status: Not Due       Topic Last Completion Date    TETANUS VACCINE 09/18/2019    Lipid Panel 11/07/2022     Immunization History   Administered Date(s) Administered    COVID-19, MRNA, LN-S, PF (MODERNA FULL 0.5 ML DOSE) 02/26/2021, 03/26/2021, 12/14/2021    Influenza - High Dose - PF (65 years and older) 09/18/2019    Influenza - Quadrivalent - High Dose - PF (65 years and older) 08/20/2020, 10/25/2021, 11/08/2022    PPD Test 09/04/2019    Pneumococcal Conjugate - 13 Valent 03/06/2019    Pneumococcal Polysaccharide - 23 Valent 07/21/2020    Tdap 09/18/2019       Review of patient's allergies indicates:  No Known Allergies    Current Outpatient Medications:     albuterol (PROVENTIL/VENTOLIN HFA) 90 mcg/actuation inhaler, Inhale 2 puffs into the lungs every 6 (six) hours as needed for Wheezing. Rescue, Disp: 18 g, Rfl: 2    ANORO ELLIPTA 62.5-25 mcg/actuation DsDv, Inhale 1 puff into the lungs once daily., Disp: 60 each, Rfl: 5    atorvastatin (LIPITOR) 10 MG tablet, Take 1 tablet (10 mg total) by mouth every evening., Disp: 90 tablet, Rfl: 1    betamethasone dipropionate 0.05 % cream, Apply topically 2 (two) times daily., Disp: 45 g, Rfl: 2    buPROPion (WELLBUTRIN SR) 150 MG TBSR 12 hr tablet, Take 1 tablet (150 mg total) by mouth 2 (two)  times daily., Disp: 180 tablet, Rfl: 1    cetirizine HCl/pseudoephedrine (ZYRTEC-D ORAL), Take by mouth., Disp: , Rfl:     ferrous sulfate (FEOSOL) 325 mg (65 mg iron) Tab tablet, Take 1 tablet (325 mg total) by mouth once daily., Disp: , Rfl: 0    FLUoxetine 40 MG capsule, Take 1 capsule (40 mg total) by mouth once daily., Disp: 90 capsule, Rfl: 1    fluticasone propionate (FLONASE) 50 mcg/actuation nasal spray, 1 spray (50 mcg total) by Each Nostril route once daily., Disp: 16 g, Rfl: 2    gabapentin (NEURONTIN) 300 MG capsule, Take 1 capsule (300 mg total) by mouth 3 (three) times daily., Disp: 270 capsule, Rfl: 1    HYDROcodone-acetaminophen (NORCO)  mg per tablet, Take 1 tablet by mouth every 8 (eight) hours as needed for Pain., Disp: 90 tablet, Rfl: 0    HYDROcodone-acetaminophen (NORCO)  mg per tablet, Take 1 tablet by mouth every 8 (eight) hours as needed for Pain., Disp: 90 tablet, Rfl: 0    HYDROcodone-acetaminophen (NORCO)  mg per tablet, Take 1 tablet by mouth every 8 (eight) hours as needed for Pain., Disp: 90 tablet, Rfl: 0    HYDROcodone-acetaminophen (NORCO)  mg per tablet, Take 1 tablet by mouth every 8 (eight) hours as needed for Pain., Disp: 90 tablet, Rfl: 0    HYDROcodone-acetaminophen (NORCO)  mg per tablet, Take 1 tablet by mouth every 8 (eight) hours as needed for Pain., Disp: 90 tablet, Rfl: 0    HYDROcodone-acetaminophen (NORCO)  mg per tablet, Take 1 tablet by mouth every 8 (eight) hours as needed for Pain., Disp: 90 tablet, Rfl: 0    lisinopriL 10 MG tablet, Take 1 tablet (10 mg total) by mouth once daily., Disp: 90 tablet, Rfl: 1    loratadine (CLARITIN) 10 mg tablet, Take 10 mg by mouth once daily., Disp: , Rfl:     mupirocin (BACTROBAN) 2 % ointment, Apply topically., Disp: , Rfl:     mupirocin (BACTROBAN) 2 % ointment, Apply topically 3 (three) times daily., Disp: 30 g, Rfl: 0    traZODone (DESYREL) 50 MG tablet, Take 2 tablets (100 mg total) by  "mouth nightly as needed for Insomnia., Disp: 180 tablet, Rfl: 1    triamcinolone acetonide 0.1% (KENALOG) 0.1 % cream, Apply topically 2 (two) times daily., Disp: 45 g, Rfl: 2  No current facility-administered medications for this visit.    Facility-Administered Medications Ordered in Other Visits:     lidocaine (PF) 10 mg/ml (1%) injection 5 mg, 0.5 mL, Intradermal, Once, Azeem Anderson MD    Review of Systems   Constitutional:  Negative for appetite change, chills, fever and unexpected weight change.   HENT:  Negative for postnasal drip, sore throat and trouble swallowing.    Eyes:  Negative for visual disturbance.   Respiratory:  Positive for cough (occasional cough with weather changes) and shortness of breath (mild with walking longer distance). Negative for wheezing.    Cardiovascular:  Negative for chest pain, palpitations and leg swelling.   Gastrointestinal:  Negative for abdominal pain, blood in stool, constipation and diarrhea.   Genitourinary:  Negative for dysuria and hematuria.   Musculoskeletal:  Positive for arthralgias (chronic right knee pain) and gait problem (mobility limited d/t knee pain, walks with cane).   Skin:  Negative for rash.   Neurological:  Negative for dizziness, syncope, speech difficulty, numbness and headaches.   Psychiatric/Behavioral:  Negative for dysphoric mood. The patient is not nervous/anxious.         Objective:      Vitals:    11/08/22 0923   BP: 134/74   Pulse: 75   SpO2: 95%   Weight: 121.7 kg (268 lb 3.2 oz)   Height: 5' 3" (1.6 m)     Physical Exam  Vitals and nursing note reviewed.   Constitutional:       General: She is not in acute distress.     Appearance: She is well-developed.      Comments: Morbidly obese white female, appears older than stated age   HENT:      Head: Normocephalic and atraumatic.   Neck:      Vascular: No carotid bruit.   Cardiovascular:      Rate and Rhythm: Normal rate and regular rhythm.      Heart sounds: No murmur heard.    No friction " rub. No gallop.   Pulmonary:      Effort: Pulmonary effort is normal. No respiratory distress.      Breath sounds: Normal breath sounds. No wheezing or rales.   Abdominal:      General: There is no distension.      Palpations: Abdomen is soft.      Tenderness: There is no abdominal tenderness.   Musculoskeletal:      Cervical back: Neck supple.      Right knee: No effusion or erythema. Normal range of motion.      Right lower leg: No edema.      Left lower leg: No edema.   Lymphadenopathy:      Cervical: No cervical adenopathy.   Skin:     General: Skin is warm and dry.      Findings: No rash.   Neurological:      General: No focal deficit present.      Mental Status: She is alert and oriented to person, place, and time.      Gait: Gait abnormal (antalgic gait).         Assessment:       1. Essential hypertension    2. Stage 3b chronic kidney disease    3. Panlobular emphysema    4. Dyslipidemia    5. Primary osteoarthritis of both knees    6. Flu vaccine need    7. Mild episode of recurrent major depressive disorder    8. Nicotine dependence, cigarettes, uncomplicated    9. Morbid obesity           Plan:       Essential hypertension  Comments:   BP well controlled    Stage 3b chronic kidney disease  Comments:   reviewed recent labs with pt with mild increase in CR, pt states she did take a few doses NSAID recently.  Cautioned to avoid NSAIDs, use Tylenol only    Panlobular emphysema  Comments:   stable though continues to smoke.  Stressed importance of using anoro inhaler daily, albuterol p.r.n.  Orders:  -     ANORO ELLIPTA 62.5-25 mcg/actuation DsDv; Inhale 1 puff into the lungs once daily.  Dispense: 60 each; Refill: 5    Dyslipidemia  Comments:   reviewed recent labs with patient: Well controlled  Orders:  -     atorvastatin (LIPITOR) 10 MG tablet; Take 1 tablet (10 mg total) by mouth every evening.  Dispense: 90 tablet; Refill: 1    Primary osteoarthritis of both knees  Comments:   stable, chronic pain  complaints.  Again discussed benefits of weight loss which would improve her pain and mobility    Flu vaccine need  -     Influenza - Quadrivalent - High Dose (65+) (PF) (IM)    Mild episode of recurrent major depressive disorder  Comments:  stable on med  Orders:  -     buPROPion (WELLBUTRIN SR) 150 MG TBSR 12 hr tablet; Take 1 tablet (150 mg total) by mouth 2 (two) times daily.  Dispense: 180 tablet; Refill: 1    Nicotine dependence, cigarettes, uncomplicated  Comments:   smoking cessation counseling once again provided.  Advised to quit completely.  Due for LDCT  Orders:  -     CT Chest Lung Screening Low Dose; Future; Expected date: 11/08/2022    Morbid obesity  Comments:   patient cautioned her excess weight significantly increases her risks for multiple comorbidities and encouraged to improve diet    Follow up in about 3 months (around 2/8/2023) for arthritis, HTN, COPD.       Patient advised she needs to complete the colon cancer screening as well as schedule mammogram and DEXA.  Cautioned on importance of preventative screenings    Counseled on age and gender appropriate medical preventative services, including cancer screenings, immunizations, overall nutritional health, need for a consistent exercise regimen and an overall push towards maintaining a vigorous and active lifestyle.      11/8/2022 Roselyn Cote NP

## 2022-11-08 NOTE — PATIENT INSTRUCTIONS
Schedule mammogram and bone density test when they call you to schedule lung CT scan    You need to complete the cologuard stool collection kit and return

## 2022-11-09 DIAGNOSIS — M79.7 FIBROMYALGIA: ICD-10-CM

## 2022-11-09 DIAGNOSIS — M25.561 RIGHT KNEE PAIN, UNSPECIFIED CHRONICITY: ICD-10-CM

## 2022-11-09 RX ORDER — HYDROCODONE BITARTRATE AND ACETAMINOPHEN 10; 325 MG/1; MG/1
1 TABLET ORAL EVERY 8 HOURS PRN
Qty: 90 TABLET | Refills: 0 | Status: SHIPPED | OUTPATIENT
Start: 2022-11-09 | End: 2022-12-22

## 2022-11-09 NOTE — TELEPHONE ENCOUNTER
----- Message from Andrea Barrera MA sent at 11/9/2022  9:52 AM CST -----  Pt calling for an update on her pain medication refill, she saw marcos yesterday but her medication hasn't been called in yet. [phone]

## 2022-11-28 ENCOUNTER — TELEPHONE (OUTPATIENT)
Dept: FAMILY MEDICINE | Facility: CLINIC | Age: 67
End: 2022-11-28

## 2022-11-28 NOTE — TELEPHONE ENCOUNTER
----- Message from Beulah Case sent at 11/25/2022  7:54 AM CST -----  This patient had orders in our workqueue for a CT Low Dose of the Lungs.  Several messages have been left for this patient to contact our office for scheduling and at the time of this jaci the patient has no contacted our office for scheduling.  I have removed the patient orders from our workqueue.  Thank you.

## 2022-11-28 NOTE — TELEPHONE ENCOUNTER
Spoke with pt in regards to recent message sent. Verbalized that the scheduling center for the Ct scan has try to call you, to schedule you for the Ct.scan. Gave pt the telephone number for [facility] to schedule the Ct scan. Pt acknowledge understanding.

## 2022-12-07 ENCOUNTER — TELEPHONE (OUTPATIENT)
Dept: FAMILY MEDICINE | Facility: CLINIC | Age: 67
End: 2022-12-07

## 2022-12-07 DIAGNOSIS — M25.561 RIGHT KNEE PAIN, UNSPECIFIED CHRONICITY: ICD-10-CM

## 2022-12-07 DIAGNOSIS — M79.7 FIBROMYALGIA: ICD-10-CM

## 2022-12-07 RX ORDER — HYDROCODONE BITARTRATE AND ACETAMINOPHEN 10; 325 MG/1; MG/1
1 TABLET ORAL EVERY 8 HOURS PRN
Qty: 90 TABLET | Refills: 0 | Status: SHIPPED | OUTPATIENT
Start: 2022-12-07 | End: 2023-01-06 | Stop reason: SDUPTHER

## 2022-12-07 NOTE — TELEPHONE ENCOUNTER
----- Message from Patricia Alston sent at 12/7/2022 12:17 PM CST -----  Patient called and stated that she need a refill of her HYDROcodone-acetaminophen called into [facility] on [address] [facility] [facility] if any questions please give her a call at [phone]

## 2022-12-22 ENCOUNTER — TELEPHONE (OUTPATIENT)
Dept: FAMILY MEDICINE | Facility: CLINIC | Age: 67
End: 2022-12-22

## 2022-12-22 ENCOUNTER — OFFICE VISIT (OUTPATIENT)
Dept: URGENT CARE | Facility: CLINIC | Age: 67
End: 2022-12-22
Payer: MEDICARE

## 2022-12-22 ENCOUNTER — HOSPITAL ENCOUNTER (INPATIENT)
Facility: HOSPITAL | Age: 67
LOS: 1 days | Discharge: ANOTHER HOSPITAL | DRG: 643 | End: 2022-12-23
Attending: EMERGENCY MEDICINE | Admitting: STUDENT IN AN ORGANIZED HEALTH CARE EDUCATION/TRAINING PROGRAM
Payer: MEDICARE

## 2022-12-22 VITALS
RESPIRATION RATE: 20 BRPM | DIASTOLIC BLOOD PRESSURE: 99 MMHG | SYSTOLIC BLOOD PRESSURE: 127 MMHG | HEIGHT: 62 IN | TEMPERATURE: 98 F | OXYGEN SATURATION: 99 % | BODY MASS INDEX: 46.19 KG/M2 | WEIGHT: 251 LBS | HEART RATE: 89 BPM

## 2022-12-22 DIAGNOSIS — R79.89 ELEVATED TROPONIN: ICD-10-CM

## 2022-12-22 DIAGNOSIS — Z86.79 HISTORY OF HYPERTENSION: ICD-10-CM

## 2022-12-22 DIAGNOSIS — Z87.09 HISTORY OF COPD: ICD-10-CM

## 2022-12-22 DIAGNOSIS — R07.9 CHEST PAIN: ICD-10-CM

## 2022-12-22 DIAGNOSIS — R10.84 GENERALIZED ABDOMINAL PAIN: ICD-10-CM

## 2022-12-22 DIAGNOSIS — R79.89 ELEVATED TROPONIN I LEVEL: ICD-10-CM

## 2022-12-22 DIAGNOSIS — R10.9 ABDOMINAL PAIN, UNSPECIFIED ABDOMINAL LOCATION: ICD-10-CM

## 2022-12-22 DIAGNOSIS — E87.1 HYPONATREMIA: Primary | ICD-10-CM

## 2022-12-22 DIAGNOSIS — Z20.822 COVID-19 VIRUS NOT DETECTED: ICD-10-CM

## 2022-12-22 DIAGNOSIS — R09.02 HYPOXIA: ICD-10-CM

## 2022-12-22 DIAGNOSIS — R11.10 VOMITING: ICD-10-CM

## 2022-12-22 DIAGNOSIS — R11.10 VOMITING, UNSPECIFIED VOMITING TYPE, UNSPECIFIED WHETHER NAUSEA PRESENT: Primary | ICD-10-CM

## 2022-12-22 PROBLEM — Z72.0 TOBACCO ABUSE: Status: ACTIVE | Noted: 2022-12-22

## 2022-12-22 PROBLEM — I12.9 BENIGN HYPERTENSION WITH CKD (CHRONIC KIDNEY DISEASE) STAGE III: Status: ACTIVE | Noted: 2019-09-13

## 2022-12-22 PROBLEM — Z72.0 TOBACCO ABUSE: Status: RESOLVED | Noted: 2022-12-22 | Resolved: 2022-12-22

## 2022-12-22 LAB
ALBUMIN SERPL BCP-MCNC: 4.4 G/DL (ref 3.5–5.2)
ALP SERPL-CCNC: 115 U/L (ref 55–135)
ALT SERPL W/O P-5'-P-CCNC: 13 U/L (ref 10–44)
AMORPH CRY URNS QL MICRO: ABNORMAL
ANION GAP SERPL CALC-SCNC: 15 MMOL/L (ref 8–16)
AST SERPL-CCNC: 22 U/L (ref 10–40)
BACTERIA #/AREA URNS HPF: ABNORMAL /HPF
BASOPHILS # BLD AUTO: 0.02 K/UL (ref 0–0.2)
BASOPHILS NFR BLD: 0.2 % (ref 0–1.9)
BILIRUB SERPL-MCNC: 1.3 MG/DL (ref 0.1–1)
BILIRUB UR QL STRIP: NEGATIVE
BUN SERPL-MCNC: 11 MG/DL (ref 8–23)
CALCIUM SERPL-MCNC: 9.9 MG/DL (ref 8.7–10.5)
CHLORIDE SERPL-SCNC: 89 MMOL/L (ref 95–110)
CLARITY UR: CLEAR
CO2 SERPL-SCNC: 22 MMOL/L (ref 23–29)
COLOR UR: YELLOW
CREAT SERPL-MCNC: 1.1 MG/DL (ref 0.5–1.4)
CTP QC/QA: YES
CTP QC/QA: YES
DIFFERENTIAL METHOD: ABNORMAL
EOSINOPHIL # BLD AUTO: 0 K/UL (ref 0–0.5)
EOSINOPHIL NFR BLD: 0.1 % (ref 0–8)
ERYTHROCYTE [DISTWIDTH] IN BLOOD BY AUTOMATED COUNT: 14.3 % (ref 11.5–14.5)
EST. GFR  (NO RACE VARIABLE): 54 ML/MIN/1.73 M^2
FLUAV AG NPH QL: NEGATIVE
FLUBV AG NPH QL: NEGATIVE
GLUCOSE SERPL-MCNC: 110 MG/DL (ref 70–110)
GLUCOSE UR QL STRIP: NEGATIVE
HCT VFR BLD AUTO: 45.8 % (ref 37–48.5)
HGB BLD-MCNC: 15.4 G/DL (ref 12–16)
HGB UR QL STRIP: ABNORMAL
HYALINE CASTS #/AREA URNS LPF: 0 /LPF
IMM GRANULOCYTES # BLD AUTO: 0.08 K/UL (ref 0–0.04)
IMM GRANULOCYTES NFR BLD AUTO: 0.6 % (ref 0–0.5)
KETONES UR QL STRIP: NEGATIVE
LEUKOCYTE ESTERASE UR QL STRIP: NEGATIVE
LIPASE SERPL-CCNC: 6 U/L (ref 4–60)
LYMPHOCYTES # BLD AUTO: 0.8 K/UL (ref 1–4.8)
LYMPHOCYTES NFR BLD: 6.2 % (ref 18–48)
MCH RBC QN AUTO: 31.3 PG (ref 27–31)
MCHC RBC AUTO-ENTMCNC: 33.6 G/DL (ref 32–36)
MCV RBC AUTO: 93 FL (ref 82–98)
MICROSCOPIC COMMENT: ABNORMAL
MONOCYTES # BLD AUTO: 0.9 K/UL (ref 0.3–1)
MONOCYTES NFR BLD: 6.9 % (ref 4–15)
NEUTROPHILS # BLD AUTO: 11.3 K/UL (ref 1.8–7.7)
NEUTROPHILS NFR BLD: 86 % (ref 38–73)
NITRITE UR QL STRIP: NEGATIVE
NRBC BLD-RTO: 0 /100 WBC
PH UR STRIP: 8 [PH] (ref 5–8)
PLATELET # BLD AUTO: 338 K/UL (ref 150–450)
PMV BLD AUTO: 9.3 FL (ref 9.2–12.9)
POTASSIUM SERPL-SCNC: 3.8 MMOL/L (ref 3.5–5.1)
PROT SERPL-MCNC: 8.2 G/DL (ref 6–8.4)
PROT UR QL STRIP: ABNORMAL
RBC # BLD AUTO: 4.92 M/UL (ref 4–5.4)
RBC #/AREA URNS HPF: 5 /HPF (ref 0–4)
SARS-COV-2 AG RESP QL IA.RAPID: NEGATIVE
SODIUM SERPL-SCNC: 126 MMOL/L (ref 136–145)
SP GR UR STRIP: 1.01 (ref 1–1.03)
SQUAMOUS #/AREA URNS HPF: 7 /HPF
TROPONIN I SERPL DL<=0.01 NG/ML-MCNC: 0.73 NG/ML (ref 0–0.03)
URN SPEC COLLECT METH UR: ABNORMAL
UROBILINOGEN UR STRIP-ACNC: NEGATIVE EU/DL
WBC # BLD AUTO: 13.13 K/UL (ref 3.9–12.7)
WBC #/AREA URNS HPF: 2 /HPF (ref 0–5)

## 2022-12-22 PROCEDURE — 80048 BASIC METABOLIC PNL TOTAL CA: CPT | Mod: XB | Performed by: NURSE PRACTITIONER

## 2022-12-22 PROCEDURE — 93010 ELECTROCARDIOGRAM REPORT: CPT | Mod: ,,, | Performed by: SPECIALIST

## 2022-12-22 PROCEDURE — 1160F PR REVIEW ALL MEDS BY PRESCRIBER/CLIN PHARMACIST DOCUMENTED: ICD-10-PCS | Mod: CPTII,S$GLB,, | Performed by: NURSE PRACTITIONER

## 2022-12-22 PROCEDURE — 1159F MED LIST DOCD IN RCRD: CPT | Mod: CPTII,S$GLB,, | Performed by: NURSE PRACTITIONER

## 2022-12-22 PROCEDURE — 3066F PR DOCUMENTATION OF TREATMENT FOR NEPHROPATHY: ICD-10-PCS | Mod: CPTII,S$GLB,, | Performed by: NURSE PRACTITIONER

## 2022-12-22 PROCEDURE — 96375 TX/PRO/DX INJ NEW DRUG ADDON: CPT

## 2022-12-22 PROCEDURE — 3080F PR MOST RECENT DIASTOLIC BLOOD PRESSURE >= 90 MM HG: ICD-10-PCS | Mod: CPTII,S$GLB,, | Performed by: NURSE PRACTITIONER

## 2022-12-22 PROCEDURE — 25500020 PHARM REV CODE 255

## 2022-12-22 PROCEDURE — 3008F BODY MASS INDEX DOCD: CPT | Mod: CPTII,S$GLB,, | Performed by: NURSE PRACTITIONER

## 2022-12-22 PROCEDURE — 96372 THER/PROPH/DIAG INJ SC/IM: CPT | Performed by: NURSE PRACTITIONER

## 2022-12-22 PROCEDURE — 3074F PR MOST RECENT SYSTOLIC BLOOD PRESSURE < 130 MM HG: ICD-10-PCS | Mod: CPTII,S$GLB,, | Performed by: NURSE PRACTITIONER

## 2022-12-22 PROCEDURE — 84484 ASSAY OF TROPONIN QUANT: CPT | Mod: 91 | Performed by: NURSE PRACTITIONER

## 2022-12-22 PROCEDURE — 4010F ACE/ARB THERAPY RXD/TAKEN: CPT | Mod: CPTII,S$GLB,, | Performed by: NURSE PRACTITIONER

## 2022-12-22 PROCEDURE — 3008F PR BODY MASS INDEX (BMI) DOCUMENTED: ICD-10-PCS | Mod: CPTII,S$GLB,, | Performed by: NURSE PRACTITIONER

## 2022-12-22 PROCEDURE — G0378 HOSPITAL OBSERVATION PER HR: HCPCS

## 2022-12-22 PROCEDURE — 85025 COMPLETE CBC W/AUTO DIFF WBC: CPT | Performed by: EMERGENCY MEDICINE

## 2022-12-22 PROCEDURE — 63600175 PHARM REV CODE 636 W HCPCS: Performed by: EMERGENCY MEDICINE

## 2022-12-22 PROCEDURE — 63600175 PHARM REV CODE 636 W HCPCS: Performed by: NURSE PRACTITIONER

## 2022-12-22 PROCEDURE — 3066F NEPHROPATHY DOC TX: CPT | Mod: CPTII,S$GLB,, | Performed by: NURSE PRACTITIONER

## 2022-12-22 PROCEDURE — 3074F SYST BP LT 130 MM HG: CPT | Mod: CPTII,S$GLB,, | Performed by: NURSE PRACTITIONER

## 2022-12-22 PROCEDURE — 25000003 PHARM REV CODE 250: Performed by: EMERGENCY MEDICINE

## 2022-12-22 PROCEDURE — 93010 EKG 12-LEAD: ICD-10-PCS | Mod: ,,, | Performed by: SPECIALIST

## 2022-12-22 PROCEDURE — 99204 PR OFFICE/OUTPT VISIT, NEW, LEVL IV, 45-59 MIN: ICD-10-PCS | Mod: S$GLB,,, | Performed by: NURSE PRACTITIONER

## 2022-12-22 PROCEDURE — 83690 ASSAY OF LIPASE: CPT | Performed by: EMERGENCY MEDICINE

## 2022-12-22 PROCEDURE — 1159F PR MEDICATION LIST DOCUMENTED IN MEDICAL RECORD: ICD-10-PCS | Mod: CPTII,S$GLB,, | Performed by: NURSE PRACTITIONER

## 2022-12-22 PROCEDURE — 96374 THER/PROPH/DIAG INJ IV PUSH: CPT

## 2022-12-22 PROCEDURE — 84484 ASSAY OF TROPONIN QUANT: CPT | Performed by: EMERGENCY MEDICINE

## 2022-12-22 PROCEDURE — 3061F NEG MICROALBUMINURIA REV: CPT | Mod: CPTII,S$GLB,, | Performed by: NURSE PRACTITIONER

## 2022-12-22 PROCEDURE — 99204 OFFICE O/P NEW MOD 45 MIN: CPT | Mod: S$GLB,,, | Performed by: NURSE PRACTITIONER

## 2022-12-22 PROCEDURE — 93005 ELECTROCARDIOGRAM TRACING: CPT

## 2022-12-22 PROCEDURE — 96376 TX/PRO/DX INJ SAME DRUG ADON: CPT

## 2022-12-22 PROCEDURE — 94761 N-INVAS EAR/PLS OXIMETRY MLT: CPT

## 2022-12-22 PROCEDURE — 3061F PR NEG MICROALBUMINURIA RESULT DOCUMENTED/REVIEW: ICD-10-PCS | Mod: CPTII,S$GLB,, | Performed by: NURSE PRACTITIONER

## 2022-12-22 PROCEDURE — 83930 ASSAY OF BLOOD OSMOLALITY: CPT | Performed by: NURSE PRACTITIONER

## 2022-12-22 PROCEDURE — 25000003 PHARM REV CODE 250: Performed by: NURSE PRACTITIONER

## 2022-12-22 PROCEDURE — 36415 COLL VENOUS BLD VENIPUNCTURE: CPT | Performed by: EMERGENCY MEDICINE

## 2022-12-22 PROCEDURE — 27000221 HC OXYGEN, UP TO 24 HOURS

## 2022-12-22 PROCEDURE — 87811 SARS-COV-2 COVID19 W/OPTIC: CPT | Mod: QW,S$GLB,, | Performed by: NURSE PRACTITIONER

## 2022-12-22 PROCEDURE — 87804 POCT INFLUENZA A/B: ICD-10-PCS | Mod: QW,,, | Performed by: NURSE PRACTITIONER

## 2022-12-22 PROCEDURE — 87804 INFLUENZA ASSAY W/OPTIC: CPT | Mod: QW,,, | Performed by: NURSE PRACTITIONER

## 2022-12-22 PROCEDURE — 96361 HYDRATE IV INFUSION ADD-ON: CPT

## 2022-12-22 PROCEDURE — 99285 EMERGENCY DEPT VISIT HI MDM: CPT | Mod: 25

## 2022-12-22 PROCEDURE — 80053 COMPREHEN METABOLIC PANEL: CPT | Performed by: EMERGENCY MEDICINE

## 2022-12-22 PROCEDURE — 36415 COLL VENOUS BLD VENIPUNCTURE: CPT | Performed by: NURSE PRACTITIONER

## 2022-12-22 PROCEDURE — 81000 URINALYSIS NONAUTO W/SCOPE: CPT | Performed by: EMERGENCY MEDICINE

## 2022-12-22 PROCEDURE — 3080F DIAST BP >= 90 MM HG: CPT | Mod: CPTII,S$GLB,, | Performed by: NURSE PRACTITIONER

## 2022-12-22 PROCEDURE — 1160F RVW MEDS BY RX/DR IN RCRD: CPT | Mod: CPTII,S$GLB,, | Performed by: NURSE PRACTITIONER

## 2022-12-22 PROCEDURE — 87811 SARS CORONAVIRUS 2 ANTIGEN POCT, MANUAL READ: ICD-10-PCS | Mod: QW,S$GLB,, | Performed by: NURSE PRACTITIONER

## 2022-12-22 PROCEDURE — 4010F PR ACE/ARB THEARPY RXD/TAKEN: ICD-10-PCS | Mod: CPTII,S$GLB,, | Performed by: NURSE PRACTITIONER

## 2022-12-22 RX ORDER — PROCHLORPERAZINE EDISYLATE 5 MG/ML
5 INJECTION INTRAMUSCULAR; INTRAVENOUS EVERY 6 HOURS PRN
Status: DISCONTINUED | OUTPATIENT
Start: 2022-12-22 | End: 2022-12-23 | Stop reason: HOSPADM

## 2022-12-22 RX ORDER — BUPROPION HYDROCHLORIDE 150 MG/1
150 TABLET, EXTENDED RELEASE ORAL 2 TIMES DAILY
Status: DISCONTINUED | OUTPATIENT
Start: 2022-12-22 | End: 2022-12-23 | Stop reason: HOSPADM

## 2022-12-22 RX ORDER — IBUPROFEN 200 MG
16 TABLET ORAL
Status: DISCONTINUED | OUTPATIENT
Start: 2022-12-22 | End: 2022-12-23 | Stop reason: HOSPADM

## 2022-12-22 RX ORDER — SIMETHICONE 80 MG
1 TABLET,CHEWABLE ORAL 4 TIMES DAILY PRN
Status: DISCONTINUED | OUTPATIENT
Start: 2022-12-22 | End: 2022-12-23 | Stop reason: HOSPADM

## 2022-12-22 RX ORDER — LISINOPRIL 10 MG/1
10 TABLET ORAL DAILY
Status: DISCONTINUED | OUTPATIENT
Start: 2022-12-23 | End: 2022-12-23 | Stop reason: HOSPADM

## 2022-12-22 RX ORDER — NALOXONE HCL 0.4 MG/ML
0.02 VIAL (ML) INJECTION
Status: DISCONTINUED | OUTPATIENT
Start: 2022-12-22 | End: 2022-12-23 | Stop reason: HOSPADM

## 2022-12-22 RX ORDER — SODIUM CHLORIDE 0.9 % (FLUSH) 0.9 %
10 SYRINGE (ML) INJECTION EVERY 8 HOURS PRN
Status: DISCONTINUED | OUTPATIENT
Start: 2022-12-22 | End: 2022-12-23 | Stop reason: HOSPADM

## 2022-12-22 RX ORDER — MAG HYDROX/ALUMINUM HYD/SIMETH 200-200-20
30 SUSPENSION, ORAL (FINAL DOSE FORM) ORAL 4 TIMES DAILY PRN
Status: DISCONTINUED | OUTPATIENT
Start: 2022-12-22 | End: 2022-12-23 | Stop reason: HOSPADM

## 2022-12-22 RX ORDER — HYDROCODONE BITARTRATE AND ACETAMINOPHEN 10; 325 MG/1; MG/1
1 TABLET ORAL EVERY 6 HOURS PRN
Status: DISCONTINUED | OUTPATIENT
Start: 2022-12-22 | End: 2022-12-23 | Stop reason: HOSPADM

## 2022-12-22 RX ORDER — TRAZODONE HYDROCHLORIDE 50 MG/1
100 TABLET ORAL NIGHTLY PRN
Status: DISCONTINUED | OUTPATIENT
Start: 2022-12-22 | End: 2022-12-23 | Stop reason: HOSPADM

## 2022-12-22 RX ORDER — ATORVASTATIN CALCIUM 10 MG/1
10 TABLET, FILM COATED ORAL NIGHTLY
Status: DISCONTINUED | OUTPATIENT
Start: 2022-12-22 | End: 2022-12-23 | Stop reason: HOSPADM

## 2022-12-22 RX ORDER — HYDROCODONE BITARTRATE AND ACETAMINOPHEN 5; 325 MG/1; MG/1
1 TABLET ORAL EVERY 6 HOURS PRN
Status: DISCONTINUED | OUTPATIENT
Start: 2022-12-22 | End: 2022-12-23 | Stop reason: HOSPADM

## 2022-12-22 RX ORDER — ONDANSETRON 2 MG/ML
4 INJECTION INTRAMUSCULAR; INTRAVENOUS EVERY 6 HOURS PRN
Status: DISCONTINUED | OUTPATIENT
Start: 2022-12-22 | End: 2022-12-23 | Stop reason: HOSPADM

## 2022-12-22 RX ORDER — GLUCAGON 1 MG
1 KIT INJECTION
Status: DISCONTINUED | OUTPATIENT
Start: 2022-12-22 | End: 2022-12-23 | Stop reason: HOSPADM

## 2022-12-22 RX ORDER — ACETAMINOPHEN 325 MG/1
650 TABLET ORAL EVERY 8 HOURS PRN
Status: DISCONTINUED | OUTPATIENT
Start: 2022-12-22 | End: 2022-12-23 | Stop reason: HOSPADM

## 2022-12-22 RX ORDER — MORPHINE SULFATE 4 MG/ML
4 INJECTION, SOLUTION INTRAMUSCULAR; INTRAVENOUS
Status: COMPLETED | OUTPATIENT
Start: 2022-12-22 | End: 2022-12-22

## 2022-12-22 RX ORDER — ENOXAPARIN SODIUM 100 MG/ML
40 INJECTION SUBCUTANEOUS EVERY 12 HOURS
Status: DISCONTINUED | OUTPATIENT
Start: 2022-12-22 | End: 2022-12-23 | Stop reason: HOSPADM

## 2022-12-22 RX ORDER — ONDANSETRON 2 MG/ML
4 INJECTION INTRAMUSCULAR; INTRAVENOUS
Status: COMPLETED | OUTPATIENT
Start: 2022-12-22 | End: 2022-12-22

## 2022-12-22 RX ORDER — SODIUM CHLORIDE 9 MG/ML
INJECTION, SOLUTION INTRAVENOUS ONCE
Status: COMPLETED | OUTPATIENT
Start: 2022-12-22 | End: 2022-12-22

## 2022-12-22 RX ORDER — IBUPROFEN 200 MG
24 TABLET ORAL
Status: DISCONTINUED | OUTPATIENT
Start: 2022-12-22 | End: 2022-12-23 | Stop reason: HOSPADM

## 2022-12-22 RX ORDER — TALC
6 POWDER (GRAM) TOPICAL NIGHTLY PRN
Status: DISCONTINUED | OUTPATIENT
Start: 2022-12-22 | End: 2022-12-23 | Stop reason: HOSPADM

## 2022-12-22 RX ORDER — AMOXICILLIN 250 MG
1 CAPSULE ORAL 2 TIMES DAILY
Status: DISCONTINUED | OUTPATIENT
Start: 2022-12-22 | End: 2022-12-23 | Stop reason: HOSPADM

## 2022-12-22 RX ORDER — IPRATROPIUM BROMIDE AND ALBUTEROL SULFATE 2.5; .5 MG/3ML; MG/3ML
3 SOLUTION RESPIRATORY (INHALATION) EVERY 6 HOURS PRN
Status: DISCONTINUED | OUTPATIENT
Start: 2022-12-22 | End: 2022-12-23 | Stop reason: HOSPADM

## 2022-12-22 RX ADMIN — ONDANSETRON 4 MG: 2 INJECTION INTRAMUSCULAR; INTRAVENOUS at 04:12

## 2022-12-22 RX ADMIN — ENOXAPARIN SODIUM 40 MG: 40 INJECTION SUBCUTANEOUS at 09:12

## 2022-12-22 RX ADMIN — IOHEXOL 100 ML: 350 INJECTION, SOLUTION INTRAVENOUS at 06:12

## 2022-12-22 RX ADMIN — MORPHINE SULFATE 4 MG: 4 INJECTION INTRAVENOUS at 04:12

## 2022-12-22 RX ADMIN — ONDANSETRON 4 MG: 2 INJECTION INTRAMUSCULAR; INTRAVENOUS at 11:12

## 2022-12-22 RX ADMIN — SODIUM CHLORIDE 1000 ML: 9 INJECTION, SOLUTION INTRAVENOUS at 04:12

## 2022-12-22 RX ADMIN — HYDROCODONE BITARTRATE AND ACETAMINOPHEN 1 TABLET: 10; 325 TABLET ORAL at 11:12

## 2022-12-22 RX ADMIN — ATORVASTATIN CALCIUM 10 MG: 10 TABLET, FILM COATED ORAL at 09:12

## 2022-12-22 RX ADMIN — BUPROPION HYDROCHLORIDE 150 MG: 150 TABLET, EXTENDED RELEASE ORAL at 11:12

## 2022-12-22 RX ADMIN — SODIUM CHLORIDE: 0.9 INJECTION, SOLUTION INTRAVENOUS at 09:12

## 2022-12-22 NOTE — Clinical Note
Diagnosis: Hyponatremia [198519]   Future Attending Provider: ROSALINE MCKINNEY [[id number]]   Admitting Provider:: ROSALINE MCKINNEY [[id number]]

## 2022-12-22 NOTE — PROGRESS NOTES
"Subjective:       Patient ID: Iris Mueller is a 67 y.o. female.    Vitals:  height is 5' 2" (1.575 m) and weight is 113.9 kg (251 lb). Her temperature is 98.1 °F (36.7 °C). Her blood pressure is 127/99 (abnormal) and her pulse is 89. Her respiration is 20 and oxygen saturation is 99%.     Chief Complaint: Emesis    Emesis   This is a new problem. The current episode started in the past 7 days (x 3 days). The problem has been unchanged. Associated symptoms include abdominal pain, chills and coughing (Chronic, nothing new or different). Pertinent negatives include no diarrhea, fever (None measured, felt feverish) or myalgias. She has tried nothing for the symptoms.     Constitution: Positive for appetite change, chills, sweating, fatigue and generalized weakness. Negative for fever (None measured, felt feverish).   HENT:  Negative for ear pain, congestion and sore throat.    Respiratory:  Positive for cough (Chronic, nothing new or different).    Gastrointestinal:  Positive for abdominal pain, nausea and vomiting. Negative for constipation (Last bowel movement this morning, normal), diarrhea, bright red blood in stool, dark colored stools, rectal bleeding and bowel incontinence.   Genitourinary:  Negative for dysuria, frequency, urgency, flank pain and hematuria.   Musculoskeletal:  Negative for muscle ache.     Objective:      Physical Exam   Constitutional: She is oriented to person, place, and time. She appears well-developed.  Non-toxic appearance. She appears ill. No distress. obesity  HENT:   Head: Normocephalic and atraumatic.   Nose: Nose normal.   Mouth/Throat: Oropharynx is clear and moist. Mucous membranes are moist.   Eyes: Conjunctivae and EOM are normal.   Cardiovascular: Normal rate, regular rhythm and normal heart sounds.   Pulmonary/Chest: Effort normal and breath sounds normal. No respiratory distress. She has no wheezes. She has no rhonchi. She has no rales.   Abdominal: Soft. " protuberant There is abdominal tenderness in the right upper quadrant and right lower quadrant. There is guarding. There is no rebound, no left CVA tenderness and no right CVA tenderness.   Neurological: no focal deficit. She is alert and oriented to person, place, and time.   Skin: Skin is warm, dry and not diaphoretic. Capillary refill takes 2 to 3 seconds.   Psychiatric: Her behavior is normal. Mood normal.   Nursing note and vitals reviewed.      Assessment:       1. Vomiting, unspecified vomiting type, unspecified whether nausea present    2. History of COPD    3. History of hypertension    4. COVID-19 virus not detected    5. Abdominal pain, unspecified abdominal location        Flu a/B negative  Plan:         Vomiting, unspecified vomiting type, unspecified whether nausea present  -     POCT Influenza A/B  -     SARS Coronavirus 2 Antigen, POCT Manual Read    History of COPD    History of hypertension    COVID-19 virus not detected    Abdominal pain, unspecified abdominal location    To ER for further evaluation of abdominal pain and vomiting.  Decision made not to obtain UA due to concerns for contaminated specimen due to patient body habitus and need for catheterized specimen.  Deferred to ER

## 2022-12-22 NOTE — ED PROVIDER NOTES
"Encounter Date: 2022    SCRIBE #1 NOTE: I, Dorina Camacho, am scribing for, and in the presence of,  Jeremiah Kohler MD.     History     Chief Complaint   Patient presents with    Abdominal Pain    Vomiting     Abd pain for the past several days -- was seen at [facility] and sent for further eval     Time seen by provider: 3:48 PM on 2022    Iris Mueller is a 67 y.o. female with a PMHx of HTN, COPD, , obesity, CKD, and fibromyalgia who presents to the ED for evaluation of abdominal pain with associated N/V that onset yesterday.  Patient report that her vomit is "all bile."  She notes accompanying subjective fevers and a HA.  The patient denies congestion, cough, leg swelling, painful urination, flank pain, diarrhea, constipation, bloody stools, acute rashes or any other symptoms at this time.  She has not started any new medications or had positive sick contact.      The history is provided by the patient.   Review of patient's allergies indicates:  No Known Allergies  Past Medical History:   Diagnosis Date    Arthritis     Asthma     COPD (chronic obstructive pulmonary disease)     Encounter for blood transfusion     Hypertension     Wound infection 2019    Right knee     Past Surgical History:   Procedure Laterality Date     SECTION      JOINT REPLACEMENT      Knee    KNEE ARTHROPLASTY Right 2019    Procedure: ARTHROPLASTY, KNEE;  Surgeon: Delio Chung MD;  Location: [facility] OR;  Service: Orthopedics;  Laterality: Right;  anesthesia:  general and block    REPLACEMENT OF WOUND VACUUM-ASSISTED CLOSURE DEVICE Right 2019    Procedure: REPLACEMENT, WOUND VAC;  Surgeon: Delio Chung MD;  Location: [facility] OR;  Service: Orthopedics;  Laterality: Right;    TONSILLECTOMY       Family History   Problem Relation Age of Onset    Alzheimer's disease Mother      Social History     Tobacco Use    Smoking status: Light Smoker     Packs/day: 0.50     Years: 30.00     " Pack years: 15.00     Types: Cigarettes    Smokeless tobacco: Never   Substance Use Topics    Alcohol use: Yes     Comment: RARELY    Drug use: Never     Review of Systems   Constitutional:  Positive for fever (subjective).   HENT:  Negative for congestion and sore throat.    Respiratory:  Negative for cough and shortness of breath.    Cardiovascular:  Negative for chest pain and leg swelling.   Gastrointestinal:  Positive for abdominal pain, nausea and vomiting (bile). Negative for blood in stool, constipation and diarrhea.   Genitourinary:  Negative for dysuria and flank pain.   Musculoskeletal:  Negative for back pain.   Skin:  Negative for rash.   Neurological:  Positive for headaches. Negative for weakness.   Hematological:  Does not bruise/bleed easily.     Physical Exam     Initial Vitals [12/22/22 1520]   BP Pulse Resp Temp SpO2   (!) 200/95 88 17 98 °F (36.7 °C) 95 %      MAP       --         Physical Exam    Nursing note and vitals reviewed.  Constitutional: She appears well-developed and well-nourished. She is not diaphoretic. She is Obese . No distress.   Large body habitus.     HENT:   Head: Normocephalic and atraumatic.   Mouth/Throat: Oropharynx is clear and moist.   Eyes: Conjunctivae are normal.   Neck: Neck supple.   Cardiovascular:  Normal rate, regular rhythm and intact distal pulses.     Exam reveals no gallop and no friction rub.       Murmur heard.  Systolic murmur is present with a grade of 3/6.  No evident tachycardia.    Pulmonary/Chest: No stridor. She has decreased breath sounds. She has no wheezes. She has no rhonchi. She has no rales.   Distant breath sounds, but clear.     Abdominal: Abdomen is soft. She exhibits no distension and no mass. There is abdominal tenderness in the left upper quadrant and left lower quadrant. No hernia. There is no guarding.   Musculoskeletal:         General: Normal range of motion.      Cervical back: Neck supple.      Right lower leg: Pitting Edema  (trace) present.      Left lower leg: Pitting Edema (trace) present.     Neurological: She is alert and oriented to person, place, and time.   Skin: Capillary refill takes less than 2 seconds. No rash noted. No erythema.   Psychiatric: She has a normal mood and affect. Her speech is normal. Thought content normal.       ED Course   Procedures  Labs Reviewed   CBC W/ AUTO DIFFERENTIAL - Abnormal; Notable for the following components:       Result Value    WBC 13.13 (*)     MCH 31.3 (*)     Immature Granulocytes 0.6 (*)     Gran # (ANC) 11.3 (*)     Immature Grans (Abs) 0.08 (*)     Lymph # 0.8 (*)     Gran % 86.0 (*)     Lymph % 6.2 (*)     All other components within normal limits   COMPREHENSIVE METABOLIC PANEL - Abnormal; Notable for the following components:    Sodium 126 (*)     Chloride 89 (*)     CO2 22 (*)     Total Bilirubin 1.3 (*)     eGFR 54 (*)     All other components within normal limits   TROPONIN I - Abnormal; Notable for the following components:    Troponin I 0.730 (*)     All other components within normal limits   LIPASE   URINALYSIS, REFLEX TO URINE CULTURE          Imaging Results              CT Abdomen Pelvis With Contrast (Final result)  Result time 12/22/22 18:28:49      Final result by Pranav Torres MD (12/22/22 18:28:49)                   Impression:      1. Diverticulosis coli with mild circumferential wall thickening of the sigmoid colon without obvious pericolonic inflammatory fat stranding, fluid or, pneumoperitoneum, nonspecific and may be secondary to incomplete distension versus early/developing or resolving diverticulitis. Otherwise, no other acute abnormality identified within the abdomen or pelvis.  2. Hepatomegaly and hepatic steatosis.  3. Stable left adrenal nodule compatible with an adenoma.  4. Atherosclerosis.  5. Additional findings, as above.      Electronically signed by: Pranav Torres  Date:    12/22/2022  Time:    18:28               Narrative:    EXAMINATION:  CT  ABDOMEN PELVIS WITH CONTRAST    CLINICAL HISTORY:  Nausea/vomiting;    TECHNIQUE:  Low dose axial images, sagittal and coronal reformations were obtained from the lung bases to the pubic symphysis following the IV administration of 100 mL of Omnipaque 350 .  Oral contrast was not administered.    COMPARISON:  CT chest 09/22/2020    FINDINGS:  Abdomen:    - Lower thorax:Heart is normal in size.  No pericardial effusion.  Calcification of the coronary arteries.    - Lung bases: Scattered bandlike densities at both lung bases suggestive of subsegmental atelectasis or scarring.  No pleural effusion.    - Liver: Liver is enlarged and measures 22 cm.  Hepatic parenchyma is diffusely decreased in attenuation most compatible with fatty infiltration.  Scattered subcentimeter calcifications along superior aspect of the left hepatic lobe.  No focal suspicious hepatic mass.    - Gallbladder: Gallbladder is distended.  No calcified gallstones. No pericholecystic inflammatory change.    - Bile Ducts: No evidence of intra or extra hepatic biliary ductal dilation.    - Pancreas: No mass or peripancreatic fat stranding.    - Stomach: Small hiatal hernia.  No acute abnormality.    - Spleen: Normal in size and attenuation.  No focal lesion.  Small accessory splenule noted.    - Adrenals: Stable-appearing 1.8 cm hypodense left adrenal nodule most compatible with an adenoma, unchanged in size since 2020.  Right adrenal gland appears within normal limits.    - Kidneys: Kidneys are normal in size and enhance appropriately. No enhancing mass or hydronephrosis. Exophytic round 1.8 cm hypodensity arising from the midpole of the left kidney, most compatible with a cyst.  Additional subcentimeter bilateral renal cortical hypodensities, too small to characterize, probably cysts.    - Bladder: Incompletely distended, limiting its evaluation.    - Reproductive: No significant abnormality.    - Bowel/Mesentery: Small incidental duodenal  diverticulum.  Numerous scattered colonic diverticula.  Mild circumferential wall thickening of the sigmoid colon, nonspecific and may be secondary to incomplete distension.  No obvious pericolonic inflammatory fat stranding, fluid or pneumoperitoneum.    No evidence of appendicitis.    - Lymph nodes: No pathologically enlarged lymph nodes.    - Vascular: Prominent calcific atherosclerosis of the abdominal aorta and branch vessels.  No abdominal aortic aneurysm.    - Abdominal Wall/Soft Tissues: Small fat-containing umbilical hernia.    - Bones: Age-appropriate degenerative changes.  No acute osseous abnormality and no suspicious lytic or blastic lesion.                                       X-Ray Chest 1 View (Final result)  Result time 12/22/22 18:08:35      Final result by Pranav Torres MD (12/22/22 18:08:35)                   Impression:      No evidence of an acute radiographic abnormality.      Electronically signed by: Pranav Torres  Date:    12/22/2022  Time:    18:08               Narrative:    EXAMINATION:  XR CHEST 1 VIEW    CLINICAL HISTORY:  Hypoxia;    TECHNIQUE:  Single frontal view of the chest was performed.    COMPARISON:  09/14/2019    FINDINGS:  Cardiomediastinal silhouette is within normal limits.  Calcific plaque projects over the thoracic aorta.  Thin bandlike opacity projects over the left lower lung suggestive of subsegmental atelectasis versus scarring.  No definite consolidation, pleural effusion, or pneumothorax.  No acute osseous abnormality.                                  (Rad read)     Medications   sodium chloride 0.9% bolus 1,000 mL 1,000 mL (0 mLs Intravenous Stopped 12/22/22 1912)   ondansetron injection 4 mg (4 mg Intravenous Given 12/22/22 1629)   morphine injection 4 mg (4 mg Intravenous Given 12/22/22 1629)   iohexoL (OMNIPAQUE 350) 350 mg iodine/mL injection (100 mLs Intravenous Given 12/22/22 1812)     Medical Decision Making:   History:   Old Medical Records: I decided to  obtain old medical records.  Independently Interpreted Test(s):   I have ordered and independently interpreted EKG Reading(s) - see prior notes  Clinical Tests:   Lab Tests: Ordered and Reviewed  Radiological Study: Ordered and Reviewed  Medical Tests: Ordered and Reviewed        Scribe Attestation:   Scribe #1: I performed the above scribed service and the documentation accurately describes the services I performed. I attest to the accuracy of the note.      ED Course as of 12/22/22 1936   Thu Dec 22, 2022   1623 EKG:  Sinus rhythm with occasional PACs, rate of 85, normal intervals except prolonged QTc 516 ms and normal axis.  There are no acute ST or T wave changes suggestive of acute ischemia or infarction.   [MR]   1657 Room air oxygen saturation noted to be between 84-88% prior to medications here correcting appropriately with 2L nasal cannula oxygen.  Patient denies dyspnea. [MR]   1710 D/w Dr. Lara cardiology workup and test findings (including EKG and troponin) pending CT who recommends keeping for admit here.  No need to txf for expedited cardiac catheterization.   [MR]      ED Course User Index  [MR] Jeremiah Kohler MD               I, Dr. Jeremiah Kohler, personally performed the services described in this documentation. All medical record entries made by the scribe were at my direction and in my presence.  I have reviewed the chart and agree that the record reflects my personal performance and is accurate and complete. Jeremiah Kohler MD.  7:33 PM 12/22/2022    Iris Mueller is a 67 y.o. female presenting with 3 days of emesis and generalized abdominal pain, more left-sided on exam with extensive workup here.  Workup is significant for nonspecific mild colitis versus under distention but I doubt emergent, life-threatening process such as diverticulitis, abscess, mesenteric ischemia.  She is notably not having diarrhea.  If infectious much more likely viral.  I do not think  empiric antibiotics are indicated.  She does have hyponatremia possibly related to vomiting.  She is not altered and has had no other major symptoms related.  This will likely improve with hydration and supportive measures.  She does have incidental hypoxia noted here.  She is not wheezing and I doubt COPD exacerbation.  I have very low suspicion for alternative process such as PE at this point.  She does have underlying COPD and I question baseline worsening of lung function with need for continued oxygen supplementation to be serially reassessed.  I doubt pneumonia.  EKG without sign of acute ischemic process.  The patient does have elevated troponin concerning for possible NSTEMI.  I did discuss with cardiology and do agree she does not require emergent cardiac catheterization or revascularization.  She will be admitted here for monitoring and trending.  I have discussed with hospital medicine who will assume care.      Clinical Impression:   Final diagnoses:  [R11.10] Vomiting  [E87.1] Hyponatremia (Primary)  [R09.02] Hypoxia  [R10.84] Generalized abdominal pain  [R77.8] Elevated troponin I level        ED Disposition Condition    Admit Stable                Jeremiah Kohler MD  12/22/22 1936

## 2022-12-22 NOTE — TELEPHONE ENCOUNTER
----- Message from Emily Lincoln MA sent at 12/22/2022  9:47 AM CST -----  Regarding: suspects kidney inf.  Suspects she has kidney infection. Complains of fever and back pain.Would like to be seen asap.  [facility]  [phone]

## 2022-12-22 NOTE — TELEPHONE ENCOUNTER
Pt states she is have N/V, back pain and low grade fever. States it has been ongoing for a few days. Not getting any better. Pt states she is experiencing urinary urgency. Recommended got to  no available appt today. Pt voiced understanding.

## 2022-12-23 ENCOUNTER — ANESTHESIA (OUTPATIENT)
Dept: INTENSIVE CARE | Facility: HOSPITAL | Age: 67
DRG: 280 | End: 2022-12-23
Payer: MEDICARE

## 2022-12-23 ENCOUNTER — ANESTHESIA EVENT (OUTPATIENT)
Dept: INTENSIVE CARE | Facility: HOSPITAL | Age: 67
DRG: 280 | End: 2022-12-23
Payer: MEDICARE

## 2022-12-23 ENCOUNTER — HOSPITAL ENCOUNTER (INPATIENT)
Facility: HOSPITAL | Age: 67
LOS: 3 days | Discharge: HOME | DRG: 280 | End: 2022-12-26
Attending: FAMILY MEDICINE | Admitting: FAMILY MEDICINE
Payer: MEDICARE

## 2022-12-23 VITALS
HEIGHT: 62 IN | HEART RATE: 87 BPM | TEMPERATURE: 97 F | OXYGEN SATURATION: 96 % | SYSTOLIC BLOOD PRESSURE: 145 MMHG | BODY MASS INDEX: 46.19 KG/M2 | DIASTOLIC BLOOD PRESSURE: 88 MMHG | RESPIRATION RATE: 16 BRPM | WEIGHT: 251 LBS

## 2022-12-23 DIAGNOSIS — I21.4 NSTEMI (NON-ST ELEVATED MYOCARDIAL INFARCTION): ICD-10-CM

## 2022-12-23 DIAGNOSIS — I10 ESSENTIAL HYPERTENSION: ICD-10-CM

## 2022-12-23 DIAGNOSIS — I95.9 HYPOTENSION: ICD-10-CM

## 2022-12-23 DIAGNOSIS — K82.9 GALLBLADDER DISORDER: Primary | ICD-10-CM

## 2022-12-23 DIAGNOSIS — K57.92 DIVERTICULITIS: ICD-10-CM

## 2022-12-23 DIAGNOSIS — R07.9 CHEST PAIN: ICD-10-CM

## 2022-12-23 LAB
ALBUMIN SERPL BCP-MCNC: 3.8 G/DL (ref 3.5–5.2)
ALP SERPL-CCNC: 80 U/L (ref 55–135)
ALT SERPL W/O P-5'-P-CCNC: 14 U/L (ref 10–44)
ANION GAP SERPL CALC-SCNC: 11 MMOL/L (ref 8–16)
ANION GAP SERPL CALC-SCNC: 12 MMOL/L (ref 8–16)
ANION GAP SERPL CALC-SCNC: 12 MMOL/L (ref 8–16)
ANION GAP SERPL CALC-SCNC: 9 MMOL/L (ref 8–16)
AORTIC ROOT ANNULUS: 2.84 CM
AORTIC VALVE CUSP SEPERATION: 1.51 CM
ASCENDING AORTA: 2.7 CM
AST SERPL-CCNC: 16 U/L (ref 10–40)
AV INDEX (PROSTH): 0.5
AV MEAN GRADIENT: 15 MMHG
AV PEAK GRADIENT: 33 MMHG
AV REGURGITATION PRESSURE HALF TIME: 457.48 MS
AV VALVE AREA: 1.55 CM2
AV VELOCITY RATIO: 0.51
BASOPHILS # BLD AUTO: 0.01 K/UL (ref 0–0.2)
BASOPHILS # BLD AUTO: 0.02 K/UL (ref 0–0.2)
BASOPHILS # BLD AUTO: 0.02 K/UL (ref 0–0.2)
BASOPHILS NFR BLD: 0.1 % (ref 0–1.9)
BASOPHILS NFR BLD: 0.2 % (ref 0–1.9)
BASOPHILS NFR BLD: 0.2 % (ref 0–1.9)
BILIRUB SERPL-MCNC: 1.2 MG/DL (ref 0.1–1)
BSA FOR ECHO PROCEDURE: 2.23 M2
BUN SERPL-MCNC: 11 MG/DL (ref 8–23)
BUN SERPL-MCNC: 12 MG/DL (ref 8–23)
BUN SERPL-MCNC: 13 MG/DL (ref 8–23)
BUN SERPL-MCNC: 15 MG/DL (ref 8–23)
CALCIUM SERPL-MCNC: 9 MG/DL (ref 8.7–10.5)
CALCIUM SERPL-MCNC: 9.1 MG/DL (ref 8.7–10.5)
CALCIUM SERPL-MCNC: 9.2 MG/DL (ref 8.7–10.5)
CALCIUM SERPL-MCNC: 9.4 MG/DL (ref 8.7–10.5)
CHLORIDE SERPL-SCNC: 95 MMOL/L (ref 95–110)
CHLORIDE SERPL-SCNC: 96 MMOL/L (ref 95–110)
CO2 SERPL-SCNC: 21 MMOL/L (ref 23–29)
CO2 SERPL-SCNC: 21 MMOL/L (ref 23–29)
CO2 SERPL-SCNC: 24 MMOL/L (ref 23–29)
CO2 SERPL-SCNC: 24 MMOL/L (ref 23–29)
CREAT SERPL-MCNC: 1 MG/DL (ref 0.5–1.4)
CREAT SERPL-MCNC: 1.1 MG/DL (ref 0.5–1.4)
CV ECHO LV RWT: 0.42 CM
DIFFERENTIAL METHOD: ABNORMAL
DOP CALC AO PEAK VEL: 2.88 M/S
DOP CALC AO VTI: 52.3 CM
DOP CALC LVOT AREA: 3.1 CM2
DOP CALC LVOT DIAMETER: 1.98 CM
DOP CALC LVOT PEAK VEL: 1.46 M/S
DOP CALC LVOT STROKE VOLUME: 80.94 CM3
DOP CALC MV VTI: 22.4 CM
DOP CALCLVOT PEAK VEL VTI: 26.3 CM
E WAVE DECELERATION TIME: 137.91 MSEC
E/A RATIO: 1.11
E/E' RATIO: 19.82 M/S
ECHO LV POSTERIOR WALL: 1.02 CM (ref 0.6–1.1)
EJECTION FRACTION: 40 %
EOSINOPHIL # BLD AUTO: 0 K/UL (ref 0–0.5)
EOSINOPHIL NFR BLD: 0 % (ref 0–8)
EOSINOPHIL NFR BLD: 0.1 % (ref 0–8)
EOSINOPHIL NFR BLD: 0.1 % (ref 0–8)
ERYTHROCYTE [DISTWIDTH] IN BLOOD BY AUTOMATED COUNT: 14.3 % (ref 11.5–14.5)
ERYTHROCYTE [DISTWIDTH] IN BLOOD BY AUTOMATED COUNT: 14.3 % (ref 11.5–14.5)
ERYTHROCYTE [DISTWIDTH] IN BLOOD BY AUTOMATED COUNT: 14.4 % (ref 11.5–14.5)
EST. GFR  (NO RACE VARIABLE): 54 ML/MIN/1.73 M^2
EST. GFR  (NO RACE VARIABLE): >60 ML/MIN/1.73 M^2
FRACTIONAL SHORTENING: 20 % (ref 28–44)
GLUCOSE SERPL-MCNC: 103 MG/DL (ref 70–110)
GLUCOSE SERPL-MCNC: 105 MG/DL (ref 70–110)
GLUCOSE SERPL-MCNC: 108 MG/DL (ref 70–110)
GLUCOSE SERPL-MCNC: 111 MG/DL (ref 70–110)
HCT VFR BLD AUTO: 39.9 % (ref 37–48.5)
HCT VFR BLD AUTO: 41.9 % (ref 37–48.5)
HCT VFR BLD AUTO: 42.9 % (ref 37–48.5)
HGB BLD-MCNC: 13.2 G/DL (ref 12–16)
HGB BLD-MCNC: 13.9 G/DL (ref 12–16)
HGB BLD-MCNC: 14 G/DL (ref 12–16)
IMM GRANULOCYTES # BLD AUTO: 0.04 K/UL (ref 0–0.04)
IMM GRANULOCYTES # BLD AUTO: 0.05 K/UL (ref 0–0.04)
IMM GRANULOCYTES # BLD AUTO: 0.06 K/UL (ref 0–0.04)
IMM GRANULOCYTES NFR BLD AUTO: 0.4 % (ref 0–0.5)
IMM GRANULOCYTES NFR BLD AUTO: 0.5 % (ref 0–0.5)
IMM GRANULOCYTES NFR BLD AUTO: 0.6 % (ref 0–0.5)
INTERVENTRICULAR SEPTUM: 1.03 CM (ref 0.6–1.1)
IVRT: 117.98 MSEC
LA MAJOR: 4.59 CM
LA MINOR: 6.87 CM
LACTATE SERPL-SCNC: 0.7 MMOL/L (ref 0.5–1.9)
LEFT ATRIUM VOLUME INDEX MOD: 67.1 ML/M2
LEFT ATRIUM VOLUME MOD: 141.68 CM3
LEFT INTERNAL DIMENSION IN SYSTOLE: 3.89 CM (ref 2.1–4)
LEFT VENTRICLE DIASTOLIC VOLUME INDEX: 51.94 ML/M2
LEFT VENTRICLE DIASTOLIC VOLUME: 109.59 ML
LEFT VENTRICLE MASS INDEX: 85 G/M2
LEFT VENTRICLE SYSTOLIC VOLUME INDEX: 31.1 ML/M2
LEFT VENTRICLE SYSTOLIC VOLUME: 65.58 ML
LEFT VENTRICULAR INTERNAL DIMENSION IN DIASTOLE: 4.84 CM (ref 3.5–6)
LEFT VENTRICULAR MASS: 178.4 G
LIPASE SERPL-CCNC: 22 U/L (ref 4–60)
LV LATERAL E/E' RATIO: 21.8 M/S
LV SEPTAL E/E' RATIO: 18.17 M/S
LVOT MG: 4.94 MMHG
LVOT MV: 1.03 CM/S
LYMPHOCYTES # BLD AUTO: 0.7 K/UL (ref 1–4.8)
LYMPHOCYTES # BLD AUTO: 0.9 K/UL (ref 1–4.8)
LYMPHOCYTES # BLD AUTO: 1 K/UL (ref 1–4.8)
LYMPHOCYTES NFR BLD: 10.4 % (ref 18–48)
LYMPHOCYTES NFR BLD: 7.4 % (ref 18–48)
LYMPHOCYTES NFR BLD: 9 % (ref 18–48)
MAGNESIUM SERPL-MCNC: 1.6 MG/DL (ref 1.6–2.6)
MCH RBC QN AUTO: 30.6 PG (ref 27–31)
MCH RBC QN AUTO: 31 PG (ref 27–31)
MCH RBC QN AUTO: 31.4 PG (ref 27–31)
MCHC RBC AUTO-ENTMCNC: 32.6 G/DL (ref 32–36)
MCHC RBC AUTO-ENTMCNC: 33.1 G/DL (ref 32–36)
MCHC RBC AUTO-ENTMCNC: 33.2 G/DL (ref 32–36)
MCV RBC AUTO: 94 FL (ref 82–98)
MCV RBC AUTO: 94 FL (ref 82–98)
MCV RBC AUTO: 95 FL (ref 82–98)
MONOCYTES # BLD AUTO: 0.7 K/UL (ref 0.3–1)
MONOCYTES # BLD AUTO: 0.7 K/UL (ref 0.3–1)
MONOCYTES # BLD AUTO: 0.8 K/UL (ref 0.3–1)
MONOCYTES NFR BLD: 7 % (ref 4–15)
MONOCYTES NFR BLD: 7.3 % (ref 4–15)
MONOCYTES NFR BLD: 7.7 % (ref 4–15)
MV A" WAVE DURATION": 144.62 MSEC
MV MEAN GRADIENT: 3 MMHG
MV PEAK A VEL: 0.98 M/S
MV PEAK E VEL: 1.09 M/S
MV PEAK GRADIENT: 8 MMHG
MV STENOSIS PRESSURE HALF TIME: 33.47 MS
MV VALVE AREA BY CONTINUITY EQUATION: 3.61 CM2
MV VALVE AREA P 1/2 METHOD: 6.57 CM2
NEUTROPHILS # BLD AUTO: 7.6 K/UL (ref 1.8–7.7)
NEUTROPHILS # BLD AUTO: 8.4 K/UL (ref 1.8–7.7)
NEUTROPHILS # BLD AUTO: 8.5 K/UL (ref 1.8–7.7)
NEUTROPHILS NFR BLD: 81 % (ref 38–73)
NEUTROPHILS NFR BLD: 82.9 % (ref 38–73)
NEUTROPHILS NFR BLD: 85.1 % (ref 38–73)
NRBC BLD-RTO: 0 /100 WBC
OSMOLALITY SERPL: 273 MOSM/KG (ref 275–295)
PISA AR MAX VEL: 4.94 M/S
PISA MRMAX VEL: 7.19 M/S
PISA TR MAX VEL: 2.82 M/S
PLATELET # BLD AUTO: 282 K/UL (ref 150–450)
PLATELET # BLD AUTO: 294 K/UL (ref 150–450)
PLATELET # BLD AUTO: 305 K/UL (ref 150–450)
PMV BLD AUTO: 8.9 FL (ref 9.2–12.9)
PMV BLD AUTO: 9.1 FL (ref 9.2–12.9)
PMV BLD AUTO: 9.2 FL (ref 9.2–12.9)
POTASSIUM SERPL-SCNC: 3.5 MMOL/L (ref 3.5–5.1)
POTASSIUM SERPL-SCNC: 3.6 MMOL/L (ref 3.5–5.1)
POTASSIUM SERPL-SCNC: 3.6 MMOL/L (ref 3.5–5.1)
POTASSIUM SERPL-SCNC: 4.7 MMOL/L (ref 3.5–5.1)
PROT SERPL-MCNC: 7 G/DL (ref 6–8.4)
PULM VEIN S/D RATIO: 1.31
PV MV: 0.68 M/S
PV PEAK D VEL: 0.51 M/S
PV PEAK S VEL: 0.67 M/S
PV PEAK VELOCITY: 1.03 CM/S
RA MAJOR: 4.32 CM
RA PRESSURE: 15 MMHG
RA VOL SYS: 15.26 ML
RA WIDTH: 2.9 CM
RBC # BLD AUTO: 4.26 M/UL (ref 4–5.4)
RBC # BLD AUTO: 4.43 M/UL (ref 4–5.4)
RBC # BLD AUTO: 4.57 M/UL (ref 4–5.4)
RIGHT VENTRICULAR END-DIASTOLIC DIMENSION: 2.45 CM
RIGHT VENTRICULAR LENGTH IN DIASTOLE (APICAL 4-CHAMBER VIEW): 4.13 CM
RV TISSUE DOPPLER FREE WALL SYSTOLIC VELOCITY 1 (APICAL 4 CHAMBER VIEW): 0.01 CM/S
SINUS: 2.71 CM
SODIUM SERPL-SCNC: 128 MMOL/L (ref 136–145)
SODIUM SERPL-SCNC: 128 MMOL/L (ref 136–145)
SODIUM SERPL-SCNC: 129 MMOL/L (ref 136–145)
SODIUM SERPL-SCNC: 130 MMOL/L (ref 136–145)
STJ: 2.3 CM
TDI LATERAL: 0.05 M/S
TDI SEPTAL: 0.06 M/S
TDI: 0.06 M/S
TR MAX PG: 32 MMHG
TR MEAN GRADIENT: 17 MMHG
TRICUSPID ANNULAR PLANE SYSTOLIC EXCURSION: 2.97 CM
TROPONIN I SERPL DL<=0.01 NG/ML-MCNC: 0.52 NG/ML (ref 0–0.03)
TROPONIN I SERPL DL<=0.01 NG/ML-MCNC: 0.65 NG/ML (ref 0–0.03)
TROPONIN I SERPL DL<=0.01 NG/ML-MCNC: 0.7 NG/ML (ref 0–0.03)
TROPONIN I SERPL HS-MCNC: 484.8 PG/ML (ref 0–14.9)
TSH SERPL DL<=0.005 MIU/L-ACNC: 1.89 UIU/ML (ref 0.4–4)
TV REST PULMONARY ARTERY PRESSURE: 47 MMHG
WBC # BLD AUTO: 10.23 K/UL (ref 3.9–12.7)
WBC # BLD AUTO: 9.36 K/UL (ref 3.9–12.7)
WBC # BLD AUTO: 9.84 K/UL (ref 3.9–12.7)

## 2022-12-23 PROCEDURE — 84484 ASSAY OF TROPONIN QUANT: CPT | Mod: 91 | Performed by: NURSE PRACTITIONER

## 2022-12-23 PROCEDURE — C9113 INJ PANTOPRAZOLE SODIUM, VIA: HCPCS | Performed by: FAMILY MEDICINE

## 2022-12-23 PROCEDURE — 27201423 OPTIME MED/SURG SUP & DEVICES STERILE SUPPLY: Performed by: INTERNAL MEDICINE

## 2022-12-23 PROCEDURE — 36415 COLL VENOUS BLD VENIPUNCTURE: CPT | Performed by: STUDENT IN AN ORGANIZED HEALTH CARE EDUCATION/TRAINING PROGRAM

## 2022-12-23 PROCEDURE — 99152 PR MOD CONSCIOUS SEDATION, SAME PHYS, 5+ YRS, FIRST 15 MIN: ICD-10-PCS | Mod: ,,, | Performed by: INTERNAL MEDICINE

## 2022-12-23 PROCEDURE — 25500020 PHARM REV CODE 255: Performed by: INTERNAL MEDICINE

## 2022-12-23 PROCEDURE — 99153 MOD SED SAME PHYS/QHP EA: CPT | Performed by: INTERNAL MEDICINE

## 2022-12-23 PROCEDURE — 25000003 PHARM REV CODE 250: Performed by: INTERNAL MEDICINE

## 2022-12-23 PROCEDURE — 25000003 PHARM REV CODE 250: Performed by: FAMILY MEDICINE

## 2022-12-23 PROCEDURE — 84443 ASSAY THYROID STIM HORMONE: CPT | Performed by: STUDENT IN AN ORGANIZED HEALTH CARE EDUCATION/TRAINING PROGRAM

## 2022-12-23 PROCEDURE — 84484 ASSAY OF TROPONIN QUANT: CPT | Mod: 91 | Performed by: FAMILY MEDICINE

## 2022-12-23 PROCEDURE — 20000000 HC ICU ROOM

## 2022-12-23 PROCEDURE — 99152 MOD SED SAME PHYS/QHP 5/>YRS: CPT | Performed by: INTERNAL MEDICINE

## 2022-12-23 PROCEDURE — 83605 ASSAY OF LACTIC ACID: CPT | Performed by: FAMILY MEDICINE

## 2022-12-23 PROCEDURE — 63600175 PHARM REV CODE 636 W HCPCS: Performed by: NURSE PRACTITIONER

## 2022-12-23 PROCEDURE — 99900035 HC TECH TIME PER 15 MIN (STAT)

## 2022-12-23 PROCEDURE — 27000221 HC OXYGEN, UP TO 24 HOURS

## 2022-12-23 PROCEDURE — 25000003 PHARM REV CODE 250: Performed by: NURSE PRACTITIONER

## 2022-12-23 PROCEDURE — 94640 AIRWAY INHALATION TREATMENT: CPT

## 2022-12-23 PROCEDURE — 85025 COMPLETE CBC W/AUTO DIFF WBC: CPT | Performed by: NURSE PRACTITIONER

## 2022-12-23 PROCEDURE — 87040 BLOOD CULTURE FOR BACTERIA: CPT | Mod: 59 | Performed by: FAMILY MEDICINE

## 2022-12-23 PROCEDURE — 63600175 PHARM REV CODE 636 W HCPCS: Performed by: INTERNAL MEDICINE

## 2022-12-23 PROCEDURE — 80053 COMPREHEN METABOLIC PANEL: CPT | Performed by: FAMILY MEDICINE

## 2022-12-23 PROCEDURE — 36415 COLL VENOUS BLD VENIPUNCTURE: CPT | Performed by: FAMILY MEDICINE

## 2022-12-23 PROCEDURE — 99223 1ST HOSP IP/OBS HIGH 75: CPT | Mod: 25,,, | Performed by: SPECIALIST

## 2022-12-23 PROCEDURE — 83735 ASSAY OF MAGNESIUM: CPT | Performed by: STUDENT IN AN ORGANIZED HEALTH CARE EDUCATION/TRAINING PROGRAM

## 2022-12-23 PROCEDURE — 93458 PR CATH PLACE/CORON ANGIO, IMG SUPER/INTERP,W LEFT HEART VENTRICULOGRAPHY: ICD-10-PCS | Mod: 26,,, | Performed by: INTERNAL MEDICINE

## 2022-12-23 PROCEDURE — C1769 GUIDE WIRE: HCPCS | Performed by: INTERNAL MEDICINE

## 2022-12-23 PROCEDURE — 63600175 PHARM REV CODE 636 W HCPCS: Performed by: FAMILY MEDICINE

## 2022-12-23 PROCEDURE — 83690 ASSAY OF LIPASE: CPT | Performed by: FAMILY MEDICINE

## 2022-12-23 PROCEDURE — C1887 CATHETER, GUIDING: HCPCS | Performed by: INTERNAL MEDICINE

## 2022-12-23 PROCEDURE — 25000003 PHARM REV CODE 250

## 2022-12-23 PROCEDURE — 80048 BASIC METABOLIC PNL TOTAL CA: CPT | Performed by: NURSE PRACTITIONER

## 2022-12-23 PROCEDURE — 85025 COMPLETE CBC W/AUTO DIFF WBC: CPT | Mod: 91 | Performed by: FAMILY MEDICINE

## 2022-12-23 PROCEDURE — 99152 MOD SED SAME PHYS/QHP 5/>YRS: CPT | Mod: ,,, | Performed by: INTERNAL MEDICINE

## 2022-12-23 PROCEDURE — 93458 L HRT ARTERY/VENTRICLE ANGIO: CPT | Performed by: INTERNAL MEDICINE

## 2022-12-23 PROCEDURE — 99900031 HC PATIENT EDUCATION (STAT)

## 2022-12-23 PROCEDURE — 83036 HEMOGLOBIN GLYCOSYLATED A1C: CPT | Performed by: FAMILY MEDICINE

## 2022-12-23 PROCEDURE — 36415 COLL VENOUS BLD VENIPUNCTURE: CPT | Performed by: NURSE PRACTITIONER

## 2022-12-23 PROCEDURE — 99223 PR INITIAL HOSPITAL CARE,LEVL III: ICD-10-PCS | Mod: 25,,, | Performed by: SPECIALIST

## 2022-12-23 PROCEDURE — 25000242 PHARM REV CODE 250 ALT 637 W/ HCPCS: Performed by: FAMILY MEDICINE

## 2022-12-23 PROCEDURE — 93458 L HRT ARTERY/VENTRICLE ANGIO: CPT | Mod: 26,,, | Performed by: INTERNAL MEDICINE

## 2022-12-23 PROCEDURE — 11000001 HC ACUTE MED/SURG PRIVATE ROOM

## 2022-12-23 PROCEDURE — C1894 INTRO/SHEATH, NON-LASER: HCPCS | Performed by: INTERNAL MEDICINE

## 2022-12-23 PROCEDURE — 94761 N-INVAS EAR/PLS OXIMETRY MLT: CPT

## 2022-12-23 PROCEDURE — 25000003 PHARM REV CODE 250: Performed by: STUDENT IN AN ORGANIZED HEALTH CARE EDUCATION/TRAINING PROGRAM

## 2022-12-23 RX ORDER — SIMETHICONE 80 MG
1 TABLET,CHEWABLE ORAL 4 TIMES DAILY PRN
Status: DISCONTINUED | OUTPATIENT
Start: 2022-12-23 | End: 2022-12-26 | Stop reason: HOSPADM

## 2022-12-23 RX ORDER — LIDOCAINE HYDROCHLORIDE 10 MG/ML
INJECTION, SOLUTION EPIDURAL; INFILTRATION; INTRACAUDAL; PERINEURAL
Status: DISCONTINUED | OUTPATIENT
Start: 2022-12-23 | End: 2022-12-23 | Stop reason: HOSPADM

## 2022-12-23 RX ORDER — IBUPROFEN 200 MG
16 TABLET ORAL
Status: DISCONTINUED | OUTPATIENT
Start: 2022-12-23 | End: 2022-12-26 | Stop reason: HOSPADM

## 2022-12-23 RX ORDER — LIDOCAINE HYDROCHLORIDE 10 MG/ML
INJECTION, SOLUTION EPIDURAL; INFILTRATION; INTRACAUDAL; PERINEURAL
Status: COMPLETED
Start: 2022-12-23 | End: 2022-12-23

## 2022-12-23 RX ORDER — HEPARIN SODIUM 1000 [USP'U]/ML
INJECTION, SOLUTION INTRAVENOUS; SUBCUTANEOUS
Status: DISCONTINUED | OUTPATIENT
Start: 2022-12-23 | End: 2022-12-23 | Stop reason: HOSPADM

## 2022-12-23 RX ORDER — POTASSIUM CHLORIDE 7.45 MG/ML
40 INJECTION INTRAVENOUS
Status: DISCONTINUED | OUTPATIENT
Start: 2022-12-23 | End: 2022-12-26 | Stop reason: HOSPADM

## 2022-12-23 RX ORDER — MAGNESIUM SULFATE 1 G/100ML
1 INJECTION INTRAVENOUS
Status: DISCONTINUED | OUTPATIENT
Start: 2022-12-23 | End: 2022-12-26 | Stop reason: HOSPADM

## 2022-12-23 RX ORDER — BUDESONIDE 0.5 MG/2ML
1 INHALANT ORAL EVERY 12 HOURS
Status: DISCONTINUED | OUTPATIENT
Start: 2022-12-23 | End: 2022-12-26 | Stop reason: HOSPADM

## 2022-12-23 RX ORDER — SUCRALFATE 1 G/10ML
1 SUSPENSION ORAL
Status: DISCONTINUED | OUTPATIENT
Start: 2022-12-23 | End: 2022-12-26 | Stop reason: HOSPADM

## 2022-12-23 RX ORDER — ASPIRIN 325 MG
325 TABLET ORAL DAILY
Status: DISCONTINUED | OUTPATIENT
Start: 2022-12-23 | End: 2022-12-23 | Stop reason: HOSPADM

## 2022-12-23 RX ORDER — POTASSIUM CHLORIDE 20 MEQ/1
20 TABLET, EXTENDED RELEASE ORAL
Status: DISCONTINUED | OUTPATIENT
Start: 2022-12-23 | End: 2022-12-26 | Stop reason: HOSPADM

## 2022-12-23 RX ORDER — MAGNESIUM SULFATE HEPTAHYDRATE 40 MG/ML
2 INJECTION, SOLUTION INTRAVENOUS
Status: DISCONTINUED | OUTPATIENT
Start: 2022-12-23 | End: 2022-12-26 | Stop reason: HOSPADM

## 2022-12-23 RX ORDER — GLUCAGON 1 MG
1 KIT INJECTION
Status: DISCONTINUED | OUTPATIENT
Start: 2022-12-23 | End: 2022-12-26 | Stop reason: HOSPADM

## 2022-12-23 RX ORDER — PANTOPRAZOLE SODIUM 40 MG/10ML
40 INJECTION, POWDER, LYOPHILIZED, FOR SOLUTION INTRAVENOUS 2 TIMES DAILY
Status: DISCONTINUED | OUTPATIENT
Start: 2022-12-23 | End: 2022-12-26 | Stop reason: HOSPADM

## 2022-12-23 RX ORDER — POTASSIUM CHLORIDE 7.45 MG/ML
10 INJECTION INTRAVENOUS
Status: DISCONTINUED | OUTPATIENT
Start: 2022-12-23 | End: 2022-12-23

## 2022-12-23 RX ORDER — MORPHINE SULFATE 4 MG/ML
4 INJECTION, SOLUTION INTRAMUSCULAR; INTRAVENOUS EVERY 4 HOURS PRN
Status: DISCONTINUED | OUTPATIENT
Start: 2022-12-23 | End: 2022-12-26 | Stop reason: HOSPADM

## 2022-12-23 RX ORDER — FENTANYL CITRATE 50 UG/ML
INJECTION, SOLUTION INTRAMUSCULAR; INTRAVENOUS
Status: DISCONTINUED | OUTPATIENT
Start: 2022-12-23 | End: 2022-12-23 | Stop reason: HOSPADM

## 2022-12-23 RX ORDER — ONDANSETRON 2 MG/ML
4 INJECTION INTRAMUSCULAR; INTRAVENOUS EVERY 6 HOURS PRN
Status: DISCONTINUED | OUTPATIENT
Start: 2022-12-23 | End: 2022-12-26 | Stop reason: HOSPADM

## 2022-12-23 RX ORDER — IODIXANOL 320 MG/ML
INJECTION, SOLUTION INTRAVASCULAR
Status: DISCONTINUED | OUTPATIENT
Start: 2022-12-23 | End: 2022-12-23 | Stop reason: HOSPADM

## 2022-12-23 RX ORDER — POTASSIUM CHLORIDE 20 MEQ/1
40 TABLET, EXTENDED RELEASE ORAL
Status: DISCONTINUED | OUTPATIENT
Start: 2022-12-23 | End: 2022-12-26 | Stop reason: HOSPADM

## 2022-12-23 RX ORDER — HYDROCODONE BITARTRATE AND ACETAMINOPHEN 10; 325 MG/1; MG/1
1 TABLET ORAL EVERY 8 HOURS PRN
Status: DISCONTINUED | OUTPATIENT
Start: 2022-12-23 | End: 2022-12-26 | Stop reason: HOSPADM

## 2022-12-23 RX ORDER — BUPROPION HYDROCHLORIDE 150 MG/1
150 TABLET, EXTENDED RELEASE ORAL 2 TIMES DAILY
Status: DISCONTINUED | OUTPATIENT
Start: 2022-12-23 | End: 2022-12-26 | Stop reason: HOSPADM

## 2022-12-23 RX ORDER — POLYETHYLENE GLYCOL 3350 17 G/17G
17 POWDER, FOR SOLUTION ORAL 2 TIMES DAILY PRN
Status: DISCONTINUED | OUTPATIENT
Start: 2022-12-23 | End: 2022-12-26 | Stop reason: HOSPADM

## 2022-12-23 RX ORDER — NALOXONE HCL 0.4 MG/ML
0.02 VIAL (ML) INJECTION
Status: DISCONTINUED | OUTPATIENT
Start: 2022-12-23 | End: 2022-12-26 | Stop reason: HOSPADM

## 2022-12-23 RX ORDER — ARFORMOTEROL TARTRATE 15 UG/2ML
15 SOLUTION RESPIRATORY (INHALATION) 2 TIMES DAILY
Status: DISCONTINUED | OUTPATIENT
Start: 2022-12-23 | End: 2022-12-26 | Stop reason: HOSPADM

## 2022-12-23 RX ORDER — MAGNESIUM SULFATE HEPTAHYDRATE 40 MG/ML
2 INJECTION, SOLUTION INTRAVENOUS ONCE
Status: DISCONTINUED | OUTPATIENT
Start: 2022-12-23 | End: 2022-12-23 | Stop reason: HOSPADM

## 2022-12-23 RX ORDER — FLUTICASONE FUROATE AND VILANTEROL 200; 25 UG/1; UG/1
1 POWDER RESPIRATORY (INHALATION) DAILY
Status: DISCONTINUED | OUTPATIENT
Start: 2022-12-24 | End: 2022-12-23

## 2022-12-23 RX ORDER — TALC
6 POWDER (GRAM) TOPICAL NIGHTLY PRN
Status: DISCONTINUED | OUTPATIENT
Start: 2022-12-23 | End: 2022-12-26 | Stop reason: HOSPADM

## 2022-12-23 RX ORDER — FLUOXETINE HYDROCHLORIDE 20 MG/1
40 CAPSULE ORAL DAILY
Status: DISCONTINUED | OUTPATIENT
Start: 2022-12-24 | End: 2022-12-26 | Stop reason: HOSPADM

## 2022-12-23 RX ORDER — IBUPROFEN 200 MG
24 TABLET ORAL
Status: DISCONTINUED | OUTPATIENT
Start: 2022-12-23 | End: 2022-12-26 | Stop reason: HOSPADM

## 2022-12-23 RX ORDER — POTASSIUM CHLORIDE 14.9 MG/ML
40 INJECTION INTRAVENOUS ONCE
Status: DISCONTINUED | OUTPATIENT
Start: 2022-12-23 | End: 2022-12-23 | Stop reason: SDUPTHER

## 2022-12-23 RX ORDER — NITROGLYCERIN 5 MG/ML
INJECTION, SOLUTION INTRAVENOUS
Status: DISCONTINUED | OUTPATIENT
Start: 2022-12-23 | End: 2022-12-23 | Stop reason: HOSPADM

## 2022-12-23 RX ORDER — GABAPENTIN 300 MG/1
300 CAPSULE ORAL 3 TIMES DAILY
Status: DISCONTINUED | OUTPATIENT
Start: 2022-12-23 | End: 2022-12-24

## 2022-12-23 RX ORDER — POTASSIUM CHLORIDE 7.45 MG/ML
20 INJECTION INTRAVENOUS
Status: DISCONTINUED | OUTPATIENT
Start: 2022-12-23 | End: 2022-12-26 | Stop reason: HOSPADM

## 2022-12-23 RX ORDER — SODIUM CHLORIDE 9 MG/ML
INJECTION, SOLUTION INTRAVENOUS CONTINUOUS
Status: DISCONTINUED | OUTPATIENT
Start: 2022-12-23 | End: 2022-12-26

## 2022-12-23 RX ORDER — IPRATROPIUM BROMIDE AND ALBUTEROL SULFATE 2.5; .5 MG/3ML; MG/3ML
3 SOLUTION RESPIRATORY (INHALATION) EVERY 4 HOURS PRN
Status: DISCONTINUED | OUTPATIENT
Start: 2022-12-23 | End: 2022-12-26 | Stop reason: HOSPADM

## 2022-12-23 RX ORDER — MAGNESIUM SULFATE HEPTAHYDRATE 40 MG/ML
4 INJECTION, SOLUTION INTRAVENOUS
Status: DISCONTINUED | OUTPATIENT
Start: 2022-12-23 | End: 2022-12-26 | Stop reason: HOSPADM

## 2022-12-23 RX ORDER — ATORVASTATIN CALCIUM 10 MG/1
10 TABLET, FILM COATED ORAL NIGHTLY
Status: DISCONTINUED | OUTPATIENT
Start: 2022-12-23 | End: 2022-12-26 | Stop reason: HOSPADM

## 2022-12-23 RX ORDER — MIDAZOLAM HYDROCHLORIDE 1 MG/ML
INJECTION INTRAMUSCULAR; INTRAVENOUS
Status: DISCONTINUED | OUTPATIENT
Start: 2022-12-23 | End: 2022-12-23 | Stop reason: HOSPADM

## 2022-12-23 RX ORDER — AMOXICILLIN 250 MG
1 CAPSULE ORAL 2 TIMES DAILY PRN
Status: DISCONTINUED | OUTPATIENT
Start: 2022-12-23 | End: 2022-12-26 | Stop reason: HOSPADM

## 2022-12-23 RX ORDER — SODIUM CHLORIDE 9 MG/ML
INJECTION, SOLUTION INTRAVENOUS CONTINUOUS
Status: DISCONTINUED | OUTPATIENT
Start: 2022-12-23 | End: 2022-12-23

## 2022-12-23 RX ORDER — TRAZODONE HYDROCHLORIDE 50 MG/1
100 TABLET ORAL NIGHTLY PRN
Status: DISCONTINUED | OUTPATIENT
Start: 2022-12-23 | End: 2022-12-26 | Stop reason: HOSPADM

## 2022-12-23 RX ORDER — SODIUM CHLORIDE 0.9 % (FLUSH) 0.9 %
10 SYRINGE (ML) INJECTION
Status: DISCONTINUED | OUTPATIENT
Start: 2022-12-23 | End: 2022-12-26 | Stop reason: HOSPADM

## 2022-12-23 RX ORDER — ACETAMINOPHEN 325 MG/1
650 TABLET ORAL EVERY 8 HOURS PRN
Status: DISCONTINUED | OUTPATIENT
Start: 2022-12-23 | End: 2022-12-26 | Stop reason: HOSPADM

## 2022-12-23 RX ORDER — HYDRALAZINE HYDROCHLORIDE 20 MG/ML
INJECTION INTRAMUSCULAR; INTRAVENOUS
Status: DISCONTINUED | OUTPATIENT
Start: 2022-12-23 | End: 2022-12-23 | Stop reason: HOSPADM

## 2022-12-23 RX ADMIN — PANTOPRAZOLE SODIUM 40 MG: 40 INJECTION, POWDER, FOR SOLUTION INTRAVENOUS at 04:12

## 2022-12-23 RX ADMIN — ACETAMINOPHEN 650 MG: 325 TABLET ORAL at 08:12

## 2022-12-23 RX ADMIN — SODIUM CHLORIDE: 9 INJECTION, SOLUTION INTRAVENOUS at 09:12

## 2022-12-23 RX ADMIN — SUCRALFATE 1 G: 1 SUSPENSION ORAL at 08:12

## 2022-12-23 RX ADMIN — SODIUM CHLORIDE: 0.9 INJECTION, SOLUTION INTRAVENOUS at 04:12

## 2022-12-23 RX ADMIN — LIDOCAINE HYDROCHLORIDE: 10 INJECTION, SOLUTION EPIDURAL; INFILTRATION; INTRACAUDAL; PERINEURAL at 04:12

## 2022-12-23 RX ADMIN — SUCRALFATE 1 G: 1 SUSPENSION ORAL at 04:12

## 2022-12-23 RX ADMIN — GABAPENTIN 300 MG: 300 CAPSULE ORAL at 08:12

## 2022-12-23 RX ADMIN — BUPROPION HYDROCHLORIDE 150 MG: 150 TABLET, EXTENDED RELEASE ORAL at 10:12

## 2022-12-23 RX ADMIN — BUDESONIDE 1 MG: 0.5 INHALANT RESPIRATORY (INHALATION) at 08:12

## 2022-12-23 RX ADMIN — ENOXAPARIN SODIUM 40 MG: 40 INJECTION SUBCUTANEOUS at 10:12

## 2022-12-23 RX ADMIN — ONDANSETRON 4 MG: 2 INJECTION INTRAMUSCULAR; INTRAVENOUS at 09:12

## 2022-12-23 RX ADMIN — TRAZODONE HYDROCHLORIDE 100 MG: 50 TABLET ORAL at 01:12

## 2022-12-23 RX ADMIN — ARFORMOTEROL TARTRATE 15 MCG: 15 SOLUTION RESPIRATORY (INHALATION) at 08:12

## 2022-12-23 RX ADMIN — BUPROPION HYDROCHLORIDE 150 MG: 150 TABLET, EXTENDED RELEASE ORAL at 08:12

## 2022-12-23 RX ADMIN — PIPERACILLIN SODIUM AND TAZOBACTAM SODIUM 3.38 G: 3; .375 INJECTION, POWDER, LYOPHILIZED, FOR SOLUTION INTRAVENOUS at 04:12

## 2022-12-23 RX ADMIN — ATORVASTATIN CALCIUM 10 MG: 10 TABLET, FILM COATED ORAL at 08:12

## 2022-12-23 RX ADMIN — LISINOPRIL 10 MG: 10 TABLET ORAL at 10:12

## 2022-12-23 RX ADMIN — HYDROCODONE BITARTRATE AND ACETAMINOPHEN 1 TABLET: 10; 325 TABLET ORAL at 05:12

## 2022-12-23 NOTE — ASSESSMENT & PLAN NOTE
Patient has recurrent depression which is mild and is currently controlled. Will Continue anti-depressant medications. We will not consult psychiatry at this time. Patient does not display psychosis at this time. Continue to monitor closely and adjust plan of care as needed.    -hold SSRI secondary to hyponatremia

## 2022-12-23 NOTE — PLAN OF CARE
[facility]  Initial Discharge Assessment       Primary Care Provider: Elkin You MD    Admission Diagnosis: Hyponatremia [E87.1]  Vomiting [R11.10]  Generalized abdominal pain [R10.84]  Hypoxia [R09.02]  Elevated troponin [R77.8]  Elevated troponin I level [R77.8]  Chest pain [R07.9]    Admission Date: 12/22/2022  Expected Discharge Date: 12/23/2022    Discharge Barriers Identified: None    Payor: [employer]ARE / Plan: [employer] 65 / Product Type: Medicare Advantage /     Extended Emergency Contact Information  Primary Emergency Contact: Barbara Ribeiro  Work Phone: [phone]  Mobile Phone: [phone]  Relation: Daughter   needed? No    Discharge Plan A: Home Health  Discharge Plan B: Skilled Nursing Facility      [id number] [place], [place] - [address] AT [facility] &   [address]  [place] [address]  Phone: [phone] Fax: [phone]    SW met with patient and patient's spouse at bedside to complete discharge planning assessment.  Patient alert and oriented xs 4.  Patient verified all demographic information on facesheet is correct.  Patient verified PCP is Dr. You.  Patient verified primary health insurance is [employer].  Patient with NO home health or DME.  Patient with NO POA or Living Will.  Patient not on dialysis or medication coumadin.  Patient with no 30 day admission.  Patient with no financial issues at this time.  Patient family will provide transportation upon discharge from facility.  Patient independent with ADLs, live with spouse, drives self.      Initial Assessment (most recent)       Adult Discharge Assessment - 12/23/22 0930          Discharge Assessment    Assessment Type Discharge Planning Assessment     Confirmed/corrected address, phone number and insurance Yes     Confirmed Demographics Correct on Facesheet     Source of Information patient;family      Communicated VALERIANO with patient/caregiver Date not available/Unable to determine     People in Home spouse     Facility Arrived From: home     Do you expect to return to your current living situation? Yes     Do you have help at home or someone to help you manage your care at home? Yes     Who are your caregiver(s) and their phone number(s)? spouse     Prior to hospitilization cognitive status: Alert/Oriented     Current cognitive status: Alert/Oriented     Readmission within 30 days? No     Patient currently being followed by outpatient case management? No     Do you currently have service(s) that help you manage your care at home? No     Do you take prescription medications? Yes     Do you have prescription coverage? Yes     Do you have any problems affording any of your prescribed medications? No     Is the patient taking medications as prescribed? yes     Who is going to help you get home at discharge? spouse     How do you get to doctors appointments? car, drives self     Are you on dialysis? No     Do you take coumadin? No     Discharge Plan A Home Health     Discharge Plan B Skilled Nursing Facility     DME Needed Upon Discharge  none     Discharge Plan discussed with: Patient;Spouse/sig other     Discharge Barriers Identified None        Physical Activity    On average, how many days per week do you engage in moderate to strenuous exercise (like a brisk walk)? Patient refused     On average, how many minutes do you engage in exercise at this level? Patient refused        Financial Resource Strain    How hard is it for you to pay for the very basics like food, housing, medical care, and heating? Patient refused        Housing Stability    In the last 12 months, was there a time when you were not able to pay the mortgage or rent on time? Patient refused     In the last 12 months, was there a time when you did not have a steady place to sleep or slept in a shelter (including now)? Patient refused         Transportation Needs    In the past 12 months, has lack of transportation kept you from medical appointments or from getting medications? Patient refused     In the past 12 months, has lack of transportation kept you from meetings, work, or from getting things needed for daily living? Patient refused        Food Insecurity    Within the past 12 months, you worried that your food would run out before you got the money to buy more. Patient refused     Within the past 12 months, the food you bought just didn't last and you didn't have money to get more. Patient refused        Stress    Do you feel stress - tense, restless, nervous, or anxious, or unable to sleep at night because your mind is troubled all the time - these days? Patient refused        Social Connections    In a typical week, how many times do you talk on the phone with family, friends, or neighbors? Patient refused     How often do you get together with friends or relatives? Patient refused     How often do you attend Buddhism or Islam services? Patient refused     Do you belong to any clubs or organizations such as Buddhism groups, unions, fraternal or athletic groups, or school groups? Patient refused     How often do you attend meetings of the clubs or organizations you belong to? Patient refused     Are you , , , , never , or living with a partner? Patient refused        Alcohol Use    Q1: How often do you have a drink containing alcohol? Patient refused     Q2: How many drinks containing alcohol do you have on a typical day when you are drinking? Patient refused     Q3: How often do you have six or more drinks on one occasion? Patient refused

## 2022-12-23 NOTE — ASSESSMENT & PLAN NOTE
Body mass index is 45.91 kg/m². Morbid obesity complicates all aspects of disease management from diagnostic modalities to treatment. Weight loss encouraged and health benefits explained to patient.

## 2022-12-23 NOTE — ED NOTES
Resting with eyes closed.  Arouses easily.  No distress noted.  O2 at 2 liters via nasal cannula in place.  IVF infusing without difficulty.

## 2022-12-23 NOTE — ED NOTES
Pt medicated for pain and nausea, bedside commode brought to room and warm wipes.  Encouraged pt to call whenever she needs to go to the restroom.

## 2022-12-23 NOTE — ED NOTES
Assumed care--Sleeping/easily awakened but denies complaint when asked. Admit pending. Will continue to monitor

## 2022-12-23 NOTE — DISCHARGE SUMMARY
[facility] Medicine  Discharge Summary      Patient Name: Iris Mueller  MRN: [id number]  BONIFACIO: [id number]  Patient Class: IP- Inpatient  Admission Date: 12/22/2022  Hospital Length of Stay: 1 days  Discharge Date and Time: 12/23/2022 12:56 PM  Attending Physician: Diallo Caballero MD   Discharging Provider: Diallo Caballero MD  Primary Care Provider: Elkin You MD    Primary Care Team: Networked reference to record PCT     HPI:   Iris Mueller is a 67-year-old female with a past medical history of hypertension, COPD, CKD, depression and fibromyalgia presenting with abdominal pain, nausea and vomiting that started yesterday.  Patient states she has been unable to tolerate anything by mouth and denies blood in emesis.  Patient reports associated subjective fevers with headaches.  She was seen at urgent care and sent to the ED for further evaluation and workup.  CT abdomen revealed diverticulosis coli with mild circumferential wall thickening of the sigmoid colon without obvious pericolonic inflammatory fat stranding, fluid or, pneumoperitoneum.  Chest x-ray showed no acute abnormality.  Lab work demonstrated hyponatremia 126, creatinine 1.1, WBC 13.13 and elevated troponin 0.73.  COVID and flu negative.  EKG revealed sinus rhythm with PAC's.  ED provider spoke to Dr. Lara, cardiologist, who agreed to consult and patient referred to Hospital Medicine.  Patient seen in the ED with  at bedside.  She denies chest pain and is complaining of acute generalized abdominal pain and shortness of breath with exertion that she states is chronic.  Patient has had no episodes of found since arriving to the ED and has remained afebrile.  Patient denies diarrhea, dysuria,and hematuria. Patient will be admitted to Hospital Medicine for further evaluation and management.          * No surgery found *      Hospital Course:   Admitted overnight with complaints of abdominal pain,  nausea, and vomiting found to have hyponatremia and elevated troponins. Overall stable with some elevated blood pressures. Started IV NS with serial BMPs. Troponins trended and remained similarly as elevated. EKG no STEMI but concerns for septal infarct, age indeterminate. Cardiology and nephrology were consulted. Echo was done showing EF 40%, G2DD, and a concerning wall motion abnormality. Dr. Wright with cardiology recommended the patient be transferred for a cardiac catheterization preferably urgently. Cardiology at [facility] Dr. Tran agreed to transfer and procedure. Accepted by hospital medicine for admission at [facility]. Already received lovenox so was given an aspirin. Had been NPO. Patient seen and examined on day of discharge.    Physical exam on day of discharge:  General - Patient alert and oriented x3 in NAD  CV - Regular rate and rhythm, +systolic murmur, No noe/rubs  Resp - Lungs CTA Bilaterally, No increased WOB  GI - BS normoactive, soft, non-distended, minimal tenderness LUQ and LLQ, no guarding, no HSM  Extrem-  No cyanosis, clubbing, edema.   Skin -  No masses, rashes or lesions noted on cursory skin exam.         Goals of Care Treatment Preferences:  Code Status: Full Code      Consults:   Consults (From admission, onward)          Status Ordering Provider     Inpatient consult to Cardiology  Once        Provider:  Elkin Wright MD    Acknowledged NAVDEEP AGUSTIN     Inpatient consult to Nephrology  Once        Provider:  Rg Danielle MD    Completed NAVDEEP AGUSTIN            No new Assessment & Plan notes have been filed under this hospital service since the last note was generated.  Service: Hospital Medicine    Final Active Diagnoses:    Diagnosis Date Noted POA    PRINCIPAL PROBLEM:  Hyponatremia [E87.1] 09/13/2019 Yes    NSTEMI (non-ST elevated myocardial infarction) [I21.4] 12/23/2022 Yes    Elevated troponin [R77.8] 12/22/2022 Yes    Mild episode of recurrent major depressive  disorder [F33.0] 01/21/2021 Yes    Dyslipidemia [E78.5] 01/21/2020 Yes    Chronic obstructive pulmonary disease [J44.9] 01/21/2020 Yes    Morbid obesity [E66.01] 09/19/2019 Yes    Benign hypertension with CKD (chronic kidney disease) stage III [I12.9, N18.30] 09/13/2019 Yes    Fibromyalgia [M79.7] 08/14/2019 Yes      Problems Resolved During this Admission:    Diagnosis Date Noted Date Resolved POA    Tobacco abuse [Z72.0] 12/22/2022 12/22/2022 Yes       Discharged Condition: good    Disposition: Short Term Hospital    Patient Instructions:   Transferred to other hospital    Significant Diagnostic Studies:     Recent Results (from the past 38 hour(s))   POCT Influenza A/B    Collection Time: 12/22/22  2:44 PM   Result Value Ref Range    Rapid Influenza A Ag Negative Negative    Rapid Influenza B Ag Negative Negative     Acceptable Yes    SARS Coronavirus 2 Antigen, POCT Manual Read    Collection Time: 12/22/22  2:44 PM   Result Value Ref Range    SARS Coronavirus 2 Antigen Negative Negative     Acceptable Yes    Complete Blood Count (CBC)    Collection Time: 12/22/22  4:11 PM   Result Value Ref Range    WBC 13.13 (H) 3.90 - 12.70 K/uL    RBC 4.92 4.00 - 5.40 M/uL    Hemoglobin 15.4 12.0 - 16.0 g/dL    Hematocrit 45.8 37.0 - 48.5 %    MCV 93 82 - 98 fL    MCH 31.3 (H) 27.0 - 31.0 pg    MCHC 33.6 32.0 - 36.0 g/dL    RDW 14.3 11.5 - 14.5 %    Platelets 338 150 - 450 K/uL    MPV 9.3 9.2 - 12.9 fL    Immature Granulocytes 0.6 (H) 0.0 - 0.5 %    Gran # (ANC) 11.3 (H) 1.8 - 7.7 K/uL    Immature Grans (Abs) 0.08 (H) 0.00 - 0.04 K/uL    Lymph # 0.8 (L) 1.0 - 4.8 K/uL    Mono # 0.9 0.3 - 1.0 K/uL    Eos # 0.0 0.0 - 0.5 K/uL    Baso # 0.02 0.00 - 0.20 K/uL    nRBC 0 0 /100 WBC    Gran % 86.0 (H) 38.0 - 73.0 %    Lymph % 6.2 (L) 18.0 - 48.0 %    Mono % 6.9 4.0 - 15.0 %    Eosinophil % 0.1 0.0 - 8.0 %    Basophil % 0.2 0.0 - 1.9 %    Differential Method Automated    Comprehensive Metabolic Panel  (CMP)    Collection Time: 12/22/22  4:11 PM   Result Value Ref Range    Sodium 126 (L) 136 - 145 mmol/L    Potassium 3.8 3.5 - 5.1 mmol/L    Chloride 89 (L) 95 - 110 mmol/L    CO2 22 (L) 23 - 29 mmol/L    Glucose 110 70 - 110 mg/dL    BUN 11 8 - 23 mg/dL    Creatinine 1.1 0.5 - 1.4 mg/dL    Calcium 9.9 8.7 - 10.5 mg/dL    Total Protein 8.2 6.0 - 8.4 g/dL    Albumin 4.4 3.5 - 5.2 g/dL    Total Bilirubin 1.3 (H) 0.1 - 1.0 mg/dL    Alkaline Phosphatase 115 55 - 135 U/L    AST 22 10 - 40 U/L    ALT 13 10 - 44 U/L    Anion Gap 15 8 - 16 mmol/L    eGFR 54 (A) >60 mL/min/1.73 m^2   Lipase    Collection Time: 12/22/22  4:11 PM   Result Value Ref Range    Lipase 6 4 - 60 U/L   Troponin I    Collection Time: 12/22/22  4:11 PM   Result Value Ref Range    Troponin I 0.730 (H) 0.000 - 0.026 ng/mL   Osmolality, Serum    Collection Time: 12/22/22  4:11 PM   Result Value Ref Range    Osmolality 273 (L) 275 - 295 mOsm/kg   Urinalysis, Reflex to Urine Culture Urine, Clean Catch    Collection Time: 12/22/22  7:50 PM    Specimen: Urine   Result Value Ref Range    Specimen UA Urine, Clean Catch     Color, UA Yellow Yellow, Straw, Sonal    Appearance, UA Clear Clear    pH, UA 8.0 5.0 - 8.0    Specific Gravity, UA 1.010 1.005 - 1.030    Protein, UA 2+ (A) Negative    Glucose, UA Negative Negative    Ketones, UA Negative Negative    Bilirubin (UA) Negative Negative    Occult Blood UA 1+ (A) Negative    Nitrite, UA Negative Negative    Urobilinogen, UA Negative <2.0 EU/dL    Leukocytes, UA Negative Negative   Urinalysis Microscopic    Collection Time: 12/22/22  7:50 PM   Result Value Ref Range    RBC, UA 5 (H) 0 - 4 /hpf    WBC, UA 2 0 - 5 /hpf    Bacteria Rare None-Occ /hpf    Squam Epithel, UA 7 /hpf    Hyaline Casts, UA 0 0-1/lpf /lpf    Amorphous, UA Few None-Moderate    Microscopic Comment SEE COMMENT    Basic Metabolic Panel (BMP)    Collection Time: 12/22/22 11:47 PM   Result Value Ref Range    Sodium 129 (L) 136 - 145 mmol/L     Potassium 3.6 3.5 - 5.1 mmol/L    Chloride 96 95 - 110 mmol/L    CO2 21 (L) 23 - 29 mmol/L    Glucose 111 (H) 70 - 110 mg/dL    BUN 11 8 - 23 mg/dL    Creatinine 1.0 0.5 - 1.4 mg/dL    Calcium 9.2 8.7 - 10.5 mg/dL    Anion Gap 12 8 - 16 mmol/L    eGFR >60 >60 mL/min/1.73 m^2   Troponin I    Collection Time: 12/22/22 11:47 PM   Result Value Ref Range    Troponin I 0.697 (H) 0.000 - 0.026 ng/mL   Basic Metabolic Panel (BMP)    Collection Time: 12/23/22  4:05 AM   Result Value Ref Range    Sodium 128 (L) 136 - 145 mmol/L    Potassium 3.6 3.5 - 5.1 mmol/L    Chloride 95 95 - 110 mmol/L    CO2 21 (L) 23 - 29 mmol/L    Glucose 108 70 - 110 mg/dL    BUN 12 8 - 23 mg/dL    Creatinine 1.0 0.5 - 1.4 mg/dL    Calcium 9.1 8.7 - 10.5 mg/dL    Anion Gap 12 8 - 16 mmol/L    eGFR >60 >60 mL/min/1.73 m^2   CBC with Automated Differential    Collection Time: 12/23/22  4:05 AM   Result Value Ref Range    WBC 10.23 3.90 - 12.70 K/uL    RBC 4.43 4.00 - 5.40 M/uL    Hemoglobin 13.9 12.0 - 16.0 g/dL    Hematocrit 41.9 37.0 - 48.5 %    MCV 95 82 - 98 fL    MCH 31.4 (H) 27.0 - 31.0 pg    MCHC 33.2 32.0 - 36.0 g/dL    RDW 14.3 11.5 - 14.5 %    Platelets 294 150 - 450 K/uL    MPV 9.2 9.2 - 12.9 fL    Immature Granulocytes 0.5 0.0 - 0.5 %    Gran # (ANC) 8.5 (H) 1.8 - 7.7 K/uL    Immature Grans (Abs) 0.05 (H) 0.00 - 0.04 K/uL    Lymph # 0.9 (L) 1.0 - 4.8 K/uL    Mono # 0.8 0.3 - 1.0 K/uL    Eos # 0.0 0.0 - 0.5 K/uL    Baso # 0.02 0.00 - 0.20 K/uL    nRBC 0 0 /100 WBC    Gran % 82.9 (H) 38.0 - 73.0 %    Lymph % 9.0 (L) 18.0 - 48.0 %    Mono % 7.3 4.0 - 15.0 %    Eosinophil % 0.1 0.0 - 8.0 %    Basophil % 0.2 0.0 - 1.9 %    Differential Method Automated    Troponin I    Collection Time: 12/23/22  4:05 AM   Result Value Ref Range    Troponin I 0.648 (H) 0.000 - 0.026 ng/mL   TSH    Collection Time: 12/23/22  4:05 AM   Result Value Ref Range    TSH 1.889 0.400 - 4.000 uIU/mL   Magnesium    Collection Time: 12/23/22  4:05 AM   Result Value Ref  "Range    Magnesium 1.6 1.6 - 2.6 mg/dL   Echo    Collection Time: 12/23/22 10:18 AM   Result Value Ref Range    BSA 2.23 m2    TDI SEPTAL 0.06 m/s    LV LATERAL E/E' RATIO 21.80 m/s    LV SEPTAL E/E' RATIO 18.17 m/s    Left Ventricular Outflow Tract Mean Velocity 1.03 cm/s    Left Ventricular Outflow Tract Mean Gradient 4.94 mmHg    TV mean gradient 17 mmHg    Pulmonary Valve Mean Velocity 0.68 m/s    AORTIC VALVE CUSP SEPERATION 1.51 cm    TDI LATERAL 0.05 m/s    PV PEAK VELOCITY 1.03 cm/s    LVIDd 4.84 3.5 - 6.0 cm    IVS 1.03 0.6 - 1.1 cm    Posterior Wall 1.02 0.6 - 1.1 cm    Ao root annulus 2.84 cm    LVIDs 3.89 2.1 - 4.0 cm    FS 20 28 - 44 %    Sinus 2.71 cm    STJ 2.30 cm    Ascending aorta 2.70 cm    LV mass 178.40 g    RVDD 2.45 cm    TAPSE 2.97 cm    Right ventricular length in diastole (apical 4-chamber view) 4.13 cm    RV S' 0.01 cm/s    Left Ventricle Relative Wall Thickness 0.42 cm    AV regurgitation pressure 1/2 time [id number] ms    AV mean gradient 15 mmHg    AV valve area 1.55 cm2    AV Velocity Ratio 0.51     AV index (prosthetic) 0.50     MV mean gradient 3 mmHg    MV valve area p 1/2 method 6.57 cm2    MV valve area by continuity eq 3.61 cm2    E/A ratio 1.11     Mean e' 0.06 m/s    E wave deceleration time 137.91 msec    IVRT 117.98 msec    MV "A" wave duration [id number] msec    Pulm vein S/D ratio 1.31     LVOT diameter 1.98 cm    LVOT area 3.1 cm2    LVOT peak tenzin 1.46 m/s    LVOT peak VTI 26.30 cm    Ao peak tenzin 2.88 m/s    Ao VTI 52.3 cm    Mr max tenzin 7.19 m/s    LVOT stroke volume 80.94 cm3    AV peak gradient 33 mmHg    MV peak gradient 8 mmHg    E/E' ratio 19.82 m/s    MV Peak E Tenzin 1.09 m/s    AR Max Tenzin 4.94 m/s    TR Max Tenzin 2.82 m/s    MV VTI 22.4 cm    MV stenosis pressure 1/2 time 33.47 ms    MV Peak A Tenzin 0.98 m/s    PV Peak S Tenzin 0.67 m/s    PV Peak D Tenzin 0.51 m/s    LV Systolic Volume 65.58 mL    LV Systolic Volume Index 31.1 mL/m2    LV Diastolic Volume " 109.59 mL    LV Diastolic Volume Index 51.94 mL/m2    LV Mass Index 85 g/m2    Right Atrium Volume Systolic 15.26 mL    RA Major Axis 4.32 cm    Left Atrium Minor Axis 6.87 cm    Left Atrium Major Axis 4.59 cm    Triscuspid Valve Regurgitation Peak Gradient 32 mmHg    LA Volume Index (Mod) 67.1 mL/m2    LA volume (mod) 141.68 cm3    RA Width 2.90 cm    Right Atrial Pressure (from IVC) 15 mmHg    EF 40 %    TV rest pulmonary artery pressure 47 mmHg   Basic Metabolic Panel (BMP)    Collection Time: 12/23/22 12:02 PM   Result Value Ref Range    Sodium 130 (L) 136 - 145 mmol/L    Potassium 4.7 3.5 - 5.1 mmol/L    Chloride 95 95 - 110 mmol/L    CO2 24 23 - 29 mmol/L    Glucose 105 70 - 110 mg/dL    BUN 13 8 - 23 mg/dL    Creatinine 1.1 0.5 - 1.4 mg/dL    Calcium 9.4 8.7 - 10.5 mg/dL    Anion Gap 11 8 - 16 mmol/L    eGFR 54 (A) >60 mL/min/1.73 m^2   Troponin I    Collection Time: 12/23/22 12:02 PM   Result Value Ref Range    Troponin I 0.523 (H) 0.000 - 0.026 ng/mL       Imaging Results              CT Abdomen Pelvis With Contrast (Final result)  Result time 12/22/22 18:28:49      Final result by Pranav Torres MD (12/22/22 18:28:49)                   Impression:      1. Diverticulosis coli with mild circumferential wall thickening of the sigmoid colon without obvious pericolonic inflammatory fat stranding, fluid or, pneumoperitoneum, nonspecific and may be secondary to incomplete distension versus early/developing or resolving diverticulitis. Otherwise, no other acute abnormality identified within the abdomen or pelvis.  2. Hepatomegaly and hepatic steatosis.  3. Stable left adrenal nodule compatible with an adenoma.  4. Atherosclerosis.  5. Additional findings, as above.      Electronically signed by: Pranav Torres  Date:    12/22/2022  Time:    18:28               Narrative:    EXAMINATION:  CT ABDOMEN PELVIS WITH CONTRAST    CLINICAL HISTORY:  Nausea/vomiting;    TECHNIQUE:  Low dose axial images, sagittal and coronal  reformations were obtained from the lung bases to the pubic symphysis following the IV administration of 100 mL of Omnipaque 350 .  Oral contrast was not administered.    COMPARISON:  CT chest 09/22/2020    FINDINGS:  Abdomen:    - Lower thorax:Heart is normal in size.  No pericardial effusion.  Calcification of the coronary arteries.    - Lung bases: Scattered bandlike densities at both lung bases suggestive of subsegmental atelectasis or scarring.  No pleural effusion.    - Liver: Liver is enlarged and measures 22 cm.  Hepatic parenchyma is diffusely decreased in attenuation most compatible with fatty infiltration.  Scattered subcentimeter calcifications along superior aspect of the left hepatic lobe.  No focal suspicious hepatic mass.    - Gallbladder: Gallbladder is distended.  No calcified gallstones. No pericholecystic inflammatory change.    - Bile Ducts: No evidence of intra or extra hepatic biliary ductal dilation.    - Pancreas: No mass or peripancreatic fat stranding.    - Stomach: Small hiatal hernia.  No acute abnormality.    - Spleen: Normal in size and attenuation.  No focal lesion.  Small accessory splenule noted.    - Adrenals: Stable-appearing 1.8 cm hypodense left adrenal nodule most compatible with an adenoma, unchanged in size since 2020.  Right adrenal gland appears within normal limits.    - Kidneys: Kidneys are normal in size and enhance appropriately. No enhancing mass or hydronephrosis. Exophytic round 1.8 cm hypodensity arising from the midpole of the left kidney, most compatible with a cyst.  Additional subcentimeter bilateral renal cortical hypodensities, too small to characterize, probably cysts.    - Bladder: Incompletely distended, limiting its evaluation.    - Reproductive: No significant abnormality.    - Bowel/Mesentery: Small incidental duodenal diverticulum.  Numerous scattered colonic diverticula.  Mild circumferential wall thickening of the sigmoid colon, nonspecific and may be  secondary to incomplete distension.  No obvious pericolonic inflammatory fat stranding, fluid or pneumoperitoneum.    No evidence of appendicitis.    - Lymph nodes: No pathologically enlarged lymph nodes.    - Vascular: Prominent calcific atherosclerosis of the abdominal aorta and branch vessels.  No abdominal aortic aneurysm.    - Abdominal Wall/Soft Tissues: Small fat-containing umbilical hernia.    - Bones: Age-appropriate degenerative changes.  No acute osseous abnormality and no suspicious lytic or blastic lesion.                                       X-Ray Chest 1 View (Final result)  Result time 12/22/22 18:08:35      Final result by Pranav Torres MD (12/22/22 18:08:35)                   Impression:      No evidence of an acute radiographic abnormality.      Electronically signed by: Pranav Torres  Date:    12/22/2022  Time:    18:08               Narrative:    EXAMINATION:  XR CHEST 1 VIEW    CLINICAL HISTORY:  Hypoxia;    TECHNIQUE:  Single frontal view of the chest was performed.    COMPARISON:  09/14/2019    FINDINGS:  Cardiomediastinal silhouette is within normal limits.  Calcific plaque projects over the thoracic aorta.  Thin bandlike opacity projects over the left lower lung suggestive of subsegmental atelectasis versus scarring.  No definite consolidation, pleural effusion, or pneumothorax.  No acute osseous abnormality.                                            Echo Summary 12/23/22    The estimated ejection fraction is 40%. There is a anterior apical segment that is hypo to akinetic with a wall motion abnormality  Grade II left ventricular diastolic dysfunction. Mean left atrial pressure 20 mm Hg. Mild mitral annular calcification  There are segmental left ventricular wall motion abnormalities.  Normal right ventricular size with normal right ventricular systolic function.  Moderate left atrial enlargement.  Moderate aortic regurgitation.  There is moderate aortic valve stenosis.  Aortic valve area is  1.55 cm2; peak velocity is 2.88 m/s; mean gradient is 15 mmHg.  Moderate mitral regurgitation.  There is mild pulmonary hypertension.  Mild tricuspid regurgitation.  Elevated central venous pressure (15 mmHg).  The estimated PA systolic pressure is 47 mmHg.            Pending Diagnostic Studies:       Procedure Component Value Units Date/Time    Urinalysis, Reflex to Urine Culture Urine, Clean Catch [[id number]] Collected: 12/23/22 0125    Order Status: Sent Lab Status: In process Updated: 12/23/22 0131    Specimen: Urine            Medications:  Reconciled Home Medications:      Medication List        ASK your doctor about these medications      albuterol 90 mcg/actuation inhaler  Commonly known as: PROVENTIL/VENTOLIN HFA  Inhale 2 puffs into the lungs every 6 (six) hours as needed for Wheezing. Rescue     ANORO ELLIPTA 62.5-25 mcg/actuation Dsdv  Generic drug: umeclidinium-vilanteroL  Inhale 1 puff into the lungs once daily.     atorvastatin 10 MG tablet  Commonly known as: LIPITOR  Take 1 tablet (10 mg total) by mouth every evening.     betamethasone dipropionate 0.05 % cream  Apply topically 2 (two) times daily.     buPROPion 150 MG TBSR 12 hr tablet  Commonly known as: WELLBUTRIN SR  Take 1 tablet (150 mg total) by mouth 2 (two) times daily.     ferrous sulfate 325 mg (65 mg iron) Tab tablet  Commonly known as: FEOSOL  Take 1 tablet (325 mg total) by mouth once daily.     FLUoxetine 40 MG capsule  Take 1 capsule (40 mg total) by mouth once daily.     fluticasone propionate 50 mcg/actuation nasal spray  Commonly known as: FLONASE  1 spray (50 mcg total) by Each Nostril route once daily.     gabapentin 300 MG capsule  Commonly known as: NEURONTIN  Take 1 capsule (300 mg total) by mouth 3 (three) times daily.     HYDROcodone-acetaminophen  mg per tablet  Commonly known as: NORCO  Take 1 tablet by mouth every 8 (eight) hours as needed for Pain.     lisinopriL 10 MG tablet  Take 1 tablet (10 mg total) by mouth  once daily.     loratadine 10 mg tablet  Commonly known as: CLARITIN  Take 10 mg by mouth once daily.     * mupirocin 2 % ointment  Commonly known as: BACTROBAN  Apply topically.     * mupirocin 2 % ointment  Commonly known as: BACTROBAN  Apply topically 3 (three) times daily.     traZODone 50 MG tablet  Commonly known as: DESYREL  Take 2 tablets (100 mg total) by mouth nightly as needed for Insomnia.     triamcinolone acetonide 0.1% 0.1 % cream  Commonly known as: KENALOG  Apply topically 2 (two) times daily.     ZYRTEC-D ORAL  Take by mouth.           * This list has 2 medication(s) that are the same as other medications prescribed for you. Read the directions carefully, and ask your doctor or other care provider to review them with you.                  Indwelling Lines/Drains at time of discharge:   Lines/Drains/Airways       None                   Time spent on the discharge of patient: 40 minutes         Diallo Caballero MD  Department of Hospital Medicine  [facility]

## 2022-12-23 NOTE — H&P
Formerly [facility] Medicine   History & Physical     Patient Name: Iris Mueller  MRN: [id number]  Admission Date: 2022  1:09 PM  Attending Physician: Anitha Nelson MD  Face-to-Face encounter date: 2022 2:47 PM    Patient information was obtained from patient, past medical records, ER physician, and ER records.     HISTORY OF PRESENT ILLNESS:     Iris Mueller is a 67 y.o. White female   With PMH of HTN, HLD, CKD, fibromyalgia,  who is transferred to [facility]  for emergent cardiac angiogram.    She presented at [facility]  Reported n/v + fever + chills x 3 days  CT noted possible diverticulitis  Troponin mildly elevated 0.7 with improving trend  Echo with motion wall abnormality  Cardiology requested emergent transfer for angiogram    Pt is currently sedated s/p angiogram  She is easily arousable  However she cannot answer questions reliably     REVIEW OF SYSTEMS:     Unable to obtain:  sedated    PAST MEDICAL HISTORY:     Past Medical History:   Diagnosis Date    Arthritis     Asthma     COPD (chronic obstructive pulmonary disease)     Encounter for blood transfusion     Hypertension     Wound infection 2019    Right knee       PAST SURGICAL HISTORY:     Past Surgical History:   Procedure Laterality Date     SECTION      JOINT REPLACEMENT      Knee    KNEE ARTHROPLASTY Right 2019    Procedure: ARTHROPLASTY, KNEE;  Surgeon: Delio Chung MD;  Location: [facility] OR;  Service: Orthopedics;  Laterality: Right;  anesthesia:  general and block    REPLACEMENT OF WOUND VACUUM-ASSISTED CLOSURE DEVICE Right 2019    Procedure: REPLACEMENT, WOUND VAC;  Surgeon: Delio Chung MD;  Location: [facility] OR;  Service: Orthopedics;  Laterality: Right;    TONSILLECTOMY         ALLERGIES:   Patient has no known allergies.    FAMILY HISTORY:     Family History   Problem Relation Age of Onset    Alzheimer's disease Mother        SOCIAL HISTORY:     Social History      Tobacco Use    Smoking status: Light Smoker     Packs/day: 0.50     Years: 30.00     Pack years: 15.00     Types: Cigarettes    Smokeless tobacco: Never   Substance Use Topics    Alcohol use: Yes     Comment: RARELY        Social History     Substance and Sexual Activity   Sexual Activity Not Currently        HOME MEDICATIONS:     Prior to Admission medications    Medication Sig Start Date End Date Taking? Authorizing Provider   albuterol (PROVENTIL/VENTOLIN HFA) 90 mcg/actuation inhaler Inhale 2 puffs into the lungs every 6 (six) hours as needed for Wheezing. Rescue 10/21/20   Roselyn Cote NP   ANORO ELLIPTA 62.5-25 mcg/actuation DsDv Inhale 1 puff into the lungs once daily. 11/8/22   Roselyn Cote NP   atorvastatin (LIPITOR) 10 MG tablet Take 1 tablet (10 mg total) by mouth every evening. 11/8/22   Roselyn Cote NP   betamethasone dipropionate 0.05 % cream Apply topically 2 (two) times daily.  Patient not taking: Reported on 12/22/2022 7/8/22   Jeremiah De Paz PA-C   buPROPion (WELLBUTRIN SR) 150 MG TBSR 12 hr tablet Take 1 tablet (150 mg total) by mouth 2 (two) times daily. 11/8/22 11/8/23  Roselyn Cote NP   cetirizine HCl/pseudoephedrine (ZYRTEC-D ORAL) Take by mouth.    Historical Provider   ferrous sulfate (FEOSOL) 325 mg (65 mg iron) Tab tablet Take 1 tablet (325 mg total) by mouth once daily. 10/23/19   Roselyn Cote NP   FLUoxetine 40 MG capsule Take 1 capsule (40 mg total) by mouth once daily. 11/4/22   Jeremiah De Paz PA-C   fluticasone propionate (FLONASE) 50 mcg/actuation nasal spray 1 spray (50 mcg total) by Each Nostril route once daily.  Patient not taking: Reported on 12/22/2022 10/21/20   Roselyn Cote NP   gabapentin (NEURONTIN) 300 MG capsule Take 1 capsule (300 mg total) by mouth 3 (three) times daily. 4/26/22 4/26/23  Roselyn T. Melerine, NP   HYDROcodone-acetaminophen (NORCO)  mg per tablet Take 1 tablet by mouth every 8 (eight) hours as needed  "for Pain. 12/7/22   Elkin You MD   lisinopriL 10 MG tablet Take 1 tablet (10 mg total) by mouth once daily. 7/7/22 7/7/23  Roselyn Cote NP   loratadine (CLARITIN) 10 mg tablet Take 10 mg by mouth once daily.    Historical Provider   mupirocin (BACTROBAN) 2 % ointment Apply topically. 6/23/22   Historical Provider   mupirocin (BACTROBAN) 2 % ointment Apply topically 3 (three) times daily.  Patient not taking: Reported on 12/22/2022 6/24/22   LUKE Parada   traZODone (DESYREL) 50 MG tablet Take 2 tablets (100 mg total) by mouth nightly as needed for Insomnia. 8/4/22   Elkin You MD   triamcinolone acetonide 0.1% (KENALOG) 0.1 % cream Apply topically 2 (two) times daily.  Patient not taking: Reported on 12/22/2022 7/21/20   Roselyn Cote NP       PHYSICAL EXAM:     /70 (BP Location: Right arm, Patient Position: Lying)   Pulse 83   Temp 97.9 °F (36.6 °C) (Oral)   Resp (!) 25   Ht 5' 2" (1.575 m)   Wt 118.7 kg (261 lb 11 oz)   SpO2 99%   BMI 47.86 kg/m²     Gen: sedated but easily arousable  HEENT:  Eyes - no pallor  External ears with no lesions  Nares patent  Mouth - lips chapped  CV: RRR  Lungs: CTA B/L  Abd: +BS, soft, +RUQ pain response, ND  Ext: no atrophy or edema  Skin: warm, dry  Neuro: sedated but easily arousable  Psych: sedated, calm    LABS AND IMAGING:     Labs Reviewed   CULTURE, BLOOD   CULTURE, BLOOD   LACTIC ACID, PLASMA   CBC W/ AUTO DIFFERENTIAL   LIPASE   TROPONIN I HIGH SENSITIVITY   URINALYSIS, REFLEX TO URINE CULTURE   URINALYSIS MICROSCOPIC   HEMOGLOBIN A1C   COMPREHENSIVE METABOLIC PANEL           ASSESSMENT & PLAN:   Iris Mueller is a 67 y.o. female admitted for    Active Hospital Problems    Diagnosis  POA    *Diverticulitis [K57.92]  Yes    Concern for NSTEMI (non-ST elevated myocardial infarction) [I21.4]  Yes    Chronic obstructive pulmonary disease [J44.9]  Yes    Hypertension [I10]  Yes    Essential hypertension [I10]  Yes    "   Resolved Hospital Problems   No resolved problems to display.        Plan    RUQ abdominal pain  Diverticulitis  - empiric zosyn  - blood cultures  - trending CBC, CMP  - obtain lipase, lactic acid  - IVFs  - protonix, carafate  - RUQ US    Concern for NSTEMI  - angiogram done on presentation  - trending troponin  - monitor in ICU  - cardiology following, thank you    Chronic conditions as noted above/below; home medications reviewed personally by me and restarted as appropriate  Electrolyte derangement:  Trending BMP; Mg; replacement prn  DVT ppx: SCDs, start lovenox when appropriate  FULL CODE    Anitha Nelson MD  [facility] Hospitalist  12/23/2022

## 2022-12-23 NOTE — H&P (VIEW-ONLY)
[facility] [place] [facility]  Cardiology  Consult Note    Patient Name: Iris Mueller  MRN: [id number]  Admission Date: 2022  Hospital Length of Stay: 0 days  Code Status: Full Code   Attending Provider: Diallo Caballero MD   Consulting Provider: Elkin Wright MD  Primary Care Physician: Elkin You MD  Principal Problem:Hyponatremia    Patient information was obtained from patient, past medical records, and ER records.     Consults  Subjective:     Chief Complaint:  Abdominal pain     HPI:  This is a overweight woman who smokes cigarettes who developed abdominal pain and was found to have elevated troponins  EKG showed new change with anterior septal down  No acute ST-T changes  Echo shows ejection fraction 40% anterior apical thinning and hypokinesis  She denies any chest pain  She is had no revascularization procedures  Treadmill several years ago was negative  She has hypertension and is morbidly obese    Past Medical History:   Diagnosis Date    Arthritis     Asthma     COPD (chronic obstructive pulmonary disease)     Encounter for blood transfusion     Hypertension     Wound infection 2019    Right knee       Past Surgical History:   Procedure Laterality Date     SECTION      JOINT REPLACEMENT      Knee    KNEE ARTHROPLASTY Right 2019    Procedure: ARTHROPLASTY, KNEE;  Surgeon: Delio Chung MD;  Location: [facility] OR;  Service: Orthopedics;  Laterality: Right;  anesthesia:  general and block    REPLACEMENT OF WOUND VACUUM-ASSISTED CLOSURE DEVICE Right 2019    Procedure: REPLACEMENT, WOUND VAC;  Surgeon: Delio Chung MD;  Location: [facility] [place] OR;  Service: Orthopedics;  Laterality: Right;    TONSILLECTOMY         Review of patient's allergies indicates:  No Known Allergies    Current Facility-Administered Medications on File Prior to Encounter   Medication    lidocaine (PF) 10 mg/ml (1%) injection 5 mg     Current Outpatient Medications on File Prior to Encounter    Medication Sig    albuterol (PROVENTIL/VENTOLIN HFA) 90 mcg/actuation inhaler Inhale 2 puffs into the lungs every 6 (six) hours as needed for Wheezing. Rescue    ANORO ELLIPTA 62.5-25 mcg/actuation DsDv Inhale 1 puff into the lungs once daily.    atorvastatin (LIPITOR) 10 MG tablet Take 1 tablet (10 mg total) by mouth every evening.    buPROPion (WELLBUTRIN SR) 150 MG TBSR 12 hr tablet Take 1 tablet (150 mg total) by mouth 2 (two) times daily.    cetirizine HCl/pseudoephedrine (ZYRTEC-D ORAL) Take by mouth.    ferrous sulfate (FEOSOL) 325 mg (65 mg iron) Tab tablet Take 1 tablet (325 mg total) by mouth once daily.    FLUoxetine 40 MG capsule Take 1 capsule (40 mg total) by mouth once daily.    HYDROcodone-acetaminophen (NORCO)  mg per tablet Take 1 tablet by mouth every 8 (eight) hours as needed for Pain.    lisinopriL 10 MG tablet Take 1 tablet (10 mg total) by mouth once daily.    traZODone (DESYREL) 50 MG tablet Take 2 tablets (100 mg total) by mouth nightly as needed for Insomnia.    betamethasone dipropionate 0.05 % cream Apply topically 2 (two) times daily. (Patient not taking: Reported on 12/22/2022)    fluticasone propionate (FLONASE) 50 mcg/actuation nasal spray 1 spray (50 mcg total) by Each Nostril route once daily. (Patient not taking: Reported on 12/22/2022)    gabapentin (NEURONTIN) 300 MG capsule Take 1 capsule (300 mg total) by mouth 3 (three) times daily.    loratadine (CLARITIN) 10 mg tablet Take 10 mg by mouth once daily.    mupirocin (BACTROBAN) 2 % ointment Apply topically.    mupirocin (BACTROBAN) 2 % ointment Apply topically 3 (three) times daily. (Patient not taking: Reported on 12/22/2022)    triamcinolone acetonide 0.1% (KENALOG) 0.1 % cream Apply topically 2 (two) times daily. (Patient not taking: Reported on 12/22/2022)     Family History       Problem Relation (Age of Onset)    Alzheimer's disease Mother          Tobacco Use    Smoking status: Light Smoker     Packs/day: 0.50      Years: 30.00     Pack years: 15.00     Types: Cigarettes    Smokeless tobacco: Never   Substance and Sexual Activity    Alcohol use: Yes     Comment: RARELY    Drug use: Never    Sexual activity: Not Currently     ROS  Objective:     Vital Signs (Most Recent):  Temp: 97.3 °F (36.3 °C) (12/23/22 1136)  Pulse: 87 (12/23/22 1136)  Resp: 16 (12/23/22 1136)  BP: (!) 145/88 (12/23/22 1136)  SpO2: 96 % (12/23/22 1136)   Vital Signs (24h Range):  Temp:  [97.1 °F (36.2 °C)-98.3 °F (36.8 °C)] 97.3 °F (36.3 °C)  Pulse:  [78-94] 87  Resp:  [16-20] 16  SpO2:  [85 %-100 %] 96 %  BP: (127-202)/() 145/88     Weight: 113.9 kg (251 lb)  Body mass index is 45.91 kg/m².    SpO2: 96 %         Intake/Output Summary (Last 24 hours) at 12/23/2022 1223  Last data filed at 12/22/2022 1912  Gross per 24 hour   Intake 999 ml   Output --   Net 999 ml       Lines/Drains/Airways       Peripheral Intravenous Line  Duration                  Peripheral IV - Single Lumen 12/22/22 1620 20 G Anterior;Left Forearm <1 day                    Physical Exam overweight woman no distress  Blood pressure 145/80  Lungs are clear  Cardiac S4 2/6 systolic ejection murmur aortic area to the carotids  Abdomen obese mild tenderness  Extremities no flatus or edema dorsalis pedis pulses are palpable    Significant Labs: CMP   Recent Labs   Lab 12/22/22  1611 12/22/22  2347 12/23/22  0405   * 129* 128*   K 3.8 3.6 3.6   CL 89* 96 95   CO2 22* 21* 21*    111* 108   BUN 11 11 12   CREATININE 1.1 1.0 1.0   CALCIUM 9.9 9.2 9.1   PROT 8.2  --   --    ALBUMIN 4.4  --   --    BILITOT 1.3*  --   --    ALKPHOS 115  --   --    AST 22  --   --    ALT 13  --   --    ANIONGAP 15 12 12   , CBC   Recent Labs   Lab 12/22/22  1611 12/23/22  0405   WBC 13.13* 10.23   HGB 15.4 13.9   HCT 45.8 41.9    294   , and Troponin   Recent Labs   Lab 12/22/22  1611 12/22/22  2347 12/23/22  0405   TROPONINI 0.730* 0.697* 0.648*           VTE Risk Mitigation (From  admission, onward)           Ordered     enoxaparin injection 40 mg  Every 12 hours         12/1953     IP VTE HIGH RISK PATIENT  Once         12/1953     Place sequential compression device  Until discontinued         12/1953             Assessment and Plan:   1. Obesity   Atypical presentation for ASCVD  Non-STEMI with evidence of anterior apical damage suggesting subtotal occlusion of LAD  [place] moderate aortic stenosis diastolic dysfunction with elevation mean left atrial pressure    Patient is transfer to [facility] for cardiac catheterization  The estimated ejection fraction is 40%. There is a anterior apical segment that is hypo to akinetic with a wall motion abnormality  Grade II left ventricular diastolic dysfunction. Mean left atrial pressure 20 mm Hg. Mild mitral annular calcification  There are segmental left ventricular wall motion abnormalities.  Normal right ventricular size with normal right ventricular systolic function.  Moderate left atrial enlargement.  Moderate aortic regurgitation.  There is moderate aortic valve stenosis.  Aortic valve area is 1.55 cm2; peak velocity is 2.88 m/s; mean gradient is 15 mmHg.  Moderate mitral regurgitation.  There is mild pulmonary hypertension.  Mild tricuspid regurgitation.  Elevated central venous pressure (15 mmHg).  The estimated PA systolic pressure is 47 mmHg.         VTE Risk Mitigation (From admission, onward)           Ordered     enoxaparin injection 40 mg  Every 12 hours         12/1953     IP VTE HIGH RISK PATIENT  Once         12/1953     Place sequential compression device  Until discontinued         12/1953                I personally reviewed old and new ecg's, lab reports,, xray reports  and  other cardiovascular studies including  echo's, stress tests, angiogram reports, holters,and vascular studies .  In addition I evaluated original cardiac cath  ___echo  ____cxr ______ct ____scan on [employer] view or  [employer] or other viewing platforms .  I reviewed  office and hospital notes Yesx ____ of  referring providers.      Thank you for your consult.     Elkin Wright MD  Cardiology   [facility]

## 2022-12-23 NOTE — PLAN OF CARE
Initiated transfer to [facility] per Dr. Wright with transfer center, spoke with Olinda.

## 2022-12-23 NOTE — Clinical Note
90 ml of contrast were injected throughout the case. 10 mL of contrast was the total wasted during the case. 100 mL was the total amount used during the case.

## 2022-12-23 NOTE — H&P
[facility]  [facility] Medicine  History & Physical    Patient Name: Iris Mueller  MRN: [id number]  Patient Class: OP- Observation  Admission Date: 12/22/2022  Attending Physician: Diallo Caballero MD   Primary Care Provider: Elkin You MD         Patient information was obtained from patient, past medical records and ER records.     Subjective:     Principal Problem:Hyponatremia    Chief Complaint:   Chief Complaint   Patient presents with    Abdominal Pain    Vomiting     Abd pain for the past several days -- was seen at [facility] and sent for further eval        HPI: Iris Mueller is a 67-year-old female with a past medical history of hypertension, COPD, CKD, depression and fibromyalgia presenting with abdominal pain, nausea and vomiting that started yesterday.  Patient states she has been unable to tolerate anything by mouth and denies blood in emesis.  Patient reports associated subjective fevers with headaches.  She was seen at urgent care and sent to the ED for further evaluation and workup.  CT abdomen revealed diverticulosis coli with mild circumferential wall thickening of the sigmoid colon without obvious pericolonic inflammatory fat stranding, fluid or, pneumoperitoneum.  Chest x-ray showed no acute abnormality.  Lab work demonstrated hyponatremia 126, creatinine 1.1, WBC 13.13 and elevated troponin 0.73.  COVID and flu negative.  EKG revealed sinus rhythm with PAC's.  ED provider spoke to Dr. Lara, cardiologist, who agreed to consult and patient referred to Hospital Medicine.  Patient seen in the ED with  at bedside.  She denies chest pain and is complaining of acute generalized abdominal pain and shortness of breath with exertion that she states is chronic.  Patient has had no episodes of found since arriving to the ED and has remained afebrile.  Patient denies diarrhea, dysuria,and hematuria. Patient will be admitted to Hospital Medicine for further  evaluation and management.          Past Medical History:   Diagnosis Date    Arthritis     Asthma     COPD (chronic obstructive pulmonary disease)     Encounter for blood transfusion     Hypertension     Wound infection 2019    Right knee       Past Surgical History:   Procedure Laterality Date     SECTION      JOINT REPLACEMENT      Knee    KNEE ARTHROPLASTY Right 2019    Procedure: ARTHROPLASTY, KNEE;  Surgeon: Delio Chung MD;  Location: [facility];  Service: Orthopedics;  Laterality: Right;  anesthesia:  general and block    REPLACEMENT OF WOUND VACUUM-ASSISTED CLOSURE DEVICE Right 2019    Procedure: REPLACEMENT, WOUND VAC;  Surgeon: Delio Chung MD;  Location: [facility];  Service: Orthopedics;  Laterality: Right;    TONSILLECTOMY         Review of patient's allergies indicates:  No Known Allergies    Current Facility-Administered Medications on File Prior to Encounter   Medication    lidocaine (PF) 10 mg/ml (1%) injection 5 mg     Current Outpatient Medications on File Prior to Encounter   Medication Sig    albuterol (PROVENTIL/VENTOLIN HFA) 90 mcg/actuation inhaler Inhale 2 puffs into the lungs every 6 (six) hours as needed for Wheezing. Rescue    ANORO ELLIPTA 62.5-25 mcg/actuation DsDv Inhale 1 puff into the lungs once daily.    atorvastatin (LIPITOR) 10 MG tablet Take 1 tablet (10 mg total) by mouth every evening.    betamethasone dipropionate 0.05 % cream Apply topically 2 (two) times daily. (Patient not taking: Reported on 2022)    buPROPion (WELLBUTRIN SR) 150 MG TBSR 12 hr tablet Take 1 tablet (150 mg total) by mouth 2 (two) times daily.    cetirizine HCl/pseudoephedrine (ZYRTEC-D ORAL) Take by mouth.    ferrous sulfate (FEOSOL) 325 mg (65 mg iron) Tab tablet Take 1 tablet (325 mg total) by mouth once daily.    FLUoxetine 40 MG capsule Take 1 capsule (40 mg total) by mouth once daily.    fluticasone propionate (FLONASE) 50 mcg/actuation nasal spray 1  spray (50 mcg total) by Each Nostril route once daily. (Patient not taking: Reported on 12/22/2022)    gabapentin (NEURONTIN) 300 MG capsule Take 1 capsule (300 mg total) by mouth 3 (three) times daily.    HYDROcodone-acetaminophen (NORCO)  mg per tablet Take 1 tablet by mouth every 8 (eight) hours as needed for Pain.    lisinopriL 10 MG tablet Take 1 tablet (10 mg total) by mouth once daily.    loratadine (CLARITIN) 10 mg tablet Take 10 mg by mouth once daily.    mupirocin (BACTROBAN) 2 % ointment Apply topically.    mupirocin (BACTROBAN) 2 % ointment Apply topically 3 (three) times daily. (Patient not taking: Reported on 12/22/2022)    traZODone (DESYREL) 50 MG tablet Take 2 tablets (100 mg total) by mouth nightly as needed for Insomnia.    triamcinolone acetonide 0.1% (KENALOG) 0.1 % cream Apply topically 2 (two) times daily. (Patient not taking: Reported on 12/22/2022)    [DISCONTINUED] HYDROcodone-acetaminophen (NORCO)  mg per tablet Take 1 tablet by mouth every 8 (eight) hours as needed for Pain.    [DISCONTINUED] HYDROcodone-acetaminophen (NORCO)  mg per tablet Take 1 tablet by mouth every 8 (eight) hours as needed for Pain.    [DISCONTINUED] HYDROcodone-acetaminophen (NORCO)  mg per tablet Take 1 tablet by mouth every 8 (eight) hours as needed for Pain.    [DISCONTINUED] HYDROcodone-acetaminophen (NORCO)  mg per tablet Take 1 tablet by mouth every 8 (eight) hours as needed for Pain.    [DISCONTINUED] HYDROcodone-acetaminophen (NORCO)  mg per tablet Take 1 tablet by mouth every 8 (eight) hours as needed for Pain.     Family History       Problem Relation (Age of Onset)    Alzheimer's disease Mother          Tobacco Use    Smoking status: Light Smoker     Packs/day: 0.50     Years: 30.00     Pack years: 15.00     Types: Cigarettes    Smokeless tobacco: Never   Substance and Sexual Activity    Alcohol use: Yes     Comment: RARELY    Drug use: Never    Sexual  activity: Not Currently     Review of Systems   Constitutional:  Positive for fever (subjective).   Respiratory:  Positive for shortness of breath (with exertion (chronic)).    Gastrointestinal:  Positive for abdominal pain, nausea and vomiting.   Neurological:  Positive for headaches.   Objective:     Vital Signs (Most Recent):  Temp: 98.3 °F (36.8 °C) (12/22/22 1928)  Pulse: 78 (12/22/22 1811)  Resp: 16 (12/22/22 1629)  BP: 133/61 (12/22/22 1811)  SpO2: 100 % (12/22/22 1811) Vital Signs (24h Range):  Temp:  [98 °F (36.7 °C)-98.3 °F (36.8 °C)] 98.3 °F (36.8 °C)  Pulse:  [78-89] 78  Resp:  [16-20] 16  SpO2:  [85 %-100 %] 100 %  BP: (127-200)/(61-99) 133/61     Weight: 113.9 kg (251 lb)  Body mass index is 45.91 kg/m².    Physical Exam  Vitals reviewed.   Constitutional:       Appearance: Normal appearance. She is obese.   HENT:      Head: Normocephalic.      Mouth/Throat:      Mouth: Mucous membranes are moist.      Pharynx: Oropharynx is clear.   Eyes:      Pupils: Pupils are equal, round, and reactive to light.   Cardiovascular:      Rate and Rhythm: Normal rate and regular rhythm.      Pulses: Normal pulses.      Heart sounds: Normal heart sounds.   Pulmonary:      Effort: Pulmonary effort is normal.      Breath sounds: Examination of the right-lower field reveals decreased breath sounds. Examination of the left-lower field reveals decreased breath sounds. Decreased breath sounds present.   Abdominal:      General: Bowel sounds are normal.      Palpations: Abdomen is soft.      Tenderness: There is abdominal tenderness in the left upper quadrant and left lower quadrant.   Musculoskeletal:         General: Normal range of motion.      Cervical back: Normal range of motion.   Skin:     General: Skin is warm and dry.   Neurological:      Mental Status: She is alert and oriented to person, place, and time. Mental status is at baseline.   Psychiatric:         Mood and Affect: Mood normal.         Speech: Speech  normal.         Behavior: Behavior normal.         CRANIAL NERVES     CN III, IV, VI   Pupils are equal, round, and reactive to light.     Significant Labs: All pertinent labs within the past 24 hours have been reviewed.  CBC:   Recent Labs   Lab 12/22/22  1611   WBC 13.13*   HGB 15.4   HCT 45.8        CMP:   Recent Labs   Lab 12/22/22  1611   *   K 3.8   CL 89*   CO2 22*      BUN 11   CREATININE 1.1   CALCIUM 9.9   PROT 8.2   ALBUMIN 4.4   BILITOT 1.3*   ALKPHOS 115   AST 22   ALT 13   ANIONGAP 15       Significant Imaging: I have reviewed all pertinent imaging results/findings within the past 24 hours.  Imaging Results              CT Abdomen Pelvis With Contrast (Final result)  Result time 12/22/22 18:28:49      Final result by Pranav Torres MD (12/22/22 18:28:49)                   Impression:      1. Diverticulosis coli with mild circumferential wall thickening of the sigmoid colon without obvious pericolonic inflammatory fat stranding, fluid or, pneumoperitoneum, nonspecific and may be secondary to incomplete distension versus early/developing or resolving diverticulitis. Otherwise, no other acute abnormality identified within the abdomen or pelvis.  2. Hepatomegaly and hepatic steatosis.  3. Stable left adrenal nodule compatible with an adenoma.  4. Atherosclerosis.  5. Additional findings, as above.      Electronically signed by: Pranav Torres  Date:    12/22/2022  Time:    18:28               Narrative:    EXAMINATION:  CT ABDOMEN PELVIS WITH CONTRAST    CLINICAL HISTORY:  Nausea/vomiting;    TECHNIQUE:  Low dose axial images, sagittal and coronal reformations were obtained from the lung bases to the pubic symphysis following the IV administration of 100 mL of Omnipaque 350 .  Oral contrast was not administered.    COMPARISON:  CT chest 09/22/2020    FINDINGS:  Abdomen:    - Lower thorax:Heart is normal in size.  No pericardial effusion.  Calcification of the coronary arteries.    - Lung  bases: Scattered bandlike densities at both lung bases suggestive of subsegmental atelectasis or scarring.  No pleural effusion.    - Liver: Liver is enlarged and measures 22 cm.  Hepatic parenchyma is diffusely decreased in attenuation most compatible with fatty infiltration.  Scattered subcentimeter calcifications along superior aspect of the left hepatic lobe.  No focal suspicious hepatic mass.    - Gallbladder: Gallbladder is distended.  No calcified gallstones. No pericholecystic inflammatory change.    - Bile Ducts: No evidence of intra or extra hepatic biliary ductal dilation.    - Pancreas: No mass or peripancreatic fat stranding.    - Stomach: Small hiatal hernia.  No acute abnormality.    - Spleen: Normal in size and attenuation.  No focal lesion.  Small accessory splenule noted.    - Adrenals: Stable-appearing 1.8 cm hypodense left adrenal nodule most compatible with an adenoma, unchanged in size since 2020.  Right adrenal gland appears within normal limits.    - Kidneys: Kidneys are normal in size and enhance appropriately. No enhancing mass or hydronephrosis. Exophytic round 1.8 cm hypodensity arising from the midpole of the left kidney, most compatible with a cyst.  Additional subcentimeter bilateral renal cortical hypodensities, too small to characterize, probably cysts.    - Bladder: Incompletely distended, limiting its evaluation.    - Reproductive: No significant abnormality.    - Bowel/Mesentery: Small incidental duodenal diverticulum.  Numerous scattered colonic diverticula.  Mild circumferential wall thickening of the sigmoid colon, nonspecific and may be secondary to incomplete distension.  No obvious pericolonic inflammatory fat stranding, fluid or pneumoperitoneum.    No evidence of appendicitis.    - Lymph nodes: No pathologically enlarged lymph nodes.    - Vascular: Prominent calcific atherosclerosis of the abdominal aorta and branch vessels.  No abdominal aortic aneurysm.    - Abdominal  Wall/Soft Tissues: Small fat-containing umbilical hernia.    - Bones: Age-appropriate degenerative changes.  No acute osseous abnormality and no suspicious lytic or blastic lesion.                                       X-Ray Chest 1 View (Final result)  Result time 12/22/22 18:08:35      Final result by Pranav Torres MD (12/22/22 18:08:35)                   Impression:      No evidence of an acute radiographic abnormality.      Electronically signed by: Pranav Torres  Date:    12/22/2022  Time:    18:08               Narrative:    EXAMINATION:  XR CHEST 1 VIEW    CLINICAL HISTORY:  Hypoxia;    TECHNIQUE:  Single frontal view of the chest was performed.    COMPARISON:  09/14/2019    FINDINGS:  Cardiomediastinal silhouette is within normal limits.  Calcific plaque projects over the thoracic aorta.  Thin bandlike opacity projects over the left lower lung suggestive of subsegmental atelectasis versus scarring.  No definite consolidation, pleural effusion, or pneumothorax.  No acute osseous abnormality.                                        Assessment/Plan:     * Hyponatremia  Na+126    -Consult Nephrology  -IV NS  -Trend sodium  -Urinalysis pending  -Urine sodium & osmo pending  -Serum osmo pending      Elevated troponin  Elevated troponin    -Trend troponin  -Telemetry  -Echo pending  -Consult cardiology      Mild episode of recurrent major depressive disorder  Patient has recurrent depression which is mild and is currently controlled. Will Continue anti-depressant medications. We will not consult psychiatry at this time. Patient does not display psychosis at this time. Continue to monitor closely and adjust plan of care as needed.    -hold SSRI secondary to hyponatremia    Chronic obstructive pulmonary disease  Patient's COPD is controlled currently.  Patient is currently off COPD Pathway. Continue scheduled inhalers Supplemental oxygen PRN and monitor respiratory status closely.     -duo nebs prn    Dyslipidemia    Patient is chronically on statin.will continue for now. Monitor clinically. Last LDL was   Lab Results   Component Value Date    LDLCALC 82 11/07/2022            Morbid obesity  Body mass index is 45.91 kg/m². Morbid obesity complicates all aspects of disease management from diagnostic modalities to treatment. Weight loss encouraged and health benefits explained to patient.         Benign hypertension with CKD (chronic kidney disease) stage III  Chronic, controlled.  Latest blood pressure and vitals reviewed-   Temp:  [98 °F (36.7 °C)-98.3 °F (36.8 °C)]   Pulse:  [78-89]   Resp:  [16-20]   BP: (127-200)/(61-99)   SpO2:  [85 %-100 %] .   Home meds for hypertension were reviewed and noted below.   Hypertension Medications             lisinopriL 10 MG tablet Take 1 tablet (10 mg total) by mouth once daily.          While in the hospital, will manage blood pressure as follows; Continue home antihypertensive regimen    Will utilize p.r.n. blood pressure medication only if patient's blood pressure greater than  180/110 and she develops symptoms such as worsening chest pain or shortness of breath.        Fibromyalgia  History noted    PRN pain medication        VTE Risk Mitigation (From admission, onward)         Ordered     enoxaparin injection 40 mg  Every 12 hours         12/1953     IP VTE HIGH RISK PATIENT  Once         12/1953     Place sequential compression device  Until discontinued         12/1955                   Carolyne Frazier NP  Department of Hospital Medicine   [facility] - Emergency Dept

## 2022-12-23 NOTE — PROVIDER TRANSFER
Outside Transfer Acceptance Note / [facility] Referral [facility]    Referring facility: [facility]   Referring provider: ROSALINE MCKINNEY  Accepting facility: [facility]  Accepting provider: BALDO BIANCHI  Admitting provider: MARTHA ANDRADE  Reason for transfer:  Need Interventional Cardiology  Transfer diagnosis: NSTEMI  Transfer specialty requested: Cardiology  Transfer specialty notified: yes  Transfer level: NUMBER 1-5: 1  Bed type requested: tele (Going to cath lab first, final destination may change)  Isolation status: No active isolations   Admission class or status: IP- Inpatient      Narrative     67-year-old female with a history of COPD, hypertension, asthma, CKD, and fibromyalgia admitted to [facility] on December 22 with abdominal pain, nausea, and vomiting.  She had no blood in her emesis by report.  She noted subjective fever and headache.  She was found to have hyponatremia with sodium 126, elevated troponin, and diverticulosis coli with mild circumferential wall thickening.  She had intermittent hypertension.  Troponin remained relatively stable (though elevated) through her stay.  Echocardiogram on December 23 had EF 40% with diastolic dysfunction and wall motion abnormalities.  Aspirin is ordered.  Sodium has increased to 130.  Case was discussed between Cardiology at [facility] and Cardiology at [facility].  Requesting transfer to Hospital Medicine at [facility] for coronary angiography.  Case discussed with Hospital Medicine at [facility] and Hospital Medicine at [facility].  Will transfer patient over for left heart catheterization.    December 23:  White blood cells 10.23, hemoglobin 13.9, hematocrit 41.9, platelets 294, sodium 128, potassium 3.6, chloride 95, CO2 21, BUN 12, creatinine 1, glucose 108, troponin 0.648, magnesium 1.6, TSH 1.889 (see repeat labs below)  -echocardiogram noted EF  40%, grade 2 diastolic dysfunction.  Anterior apical segment that is hypo to akinetic with a wall motion abnormality.  Normal RV size and RV systolic function.  Moderate aortic valve stenosis with moderate aortic regurgitation.  Moderate MR.  Mild pulmonary hypertension.  Elevated CVP.    December 22:  White blood cells 13.13, hemoglobin 15.4, hematocrit 45.8, platelets 338, lipase 6, sodium 126, potassium 3.8, chloride 89, CO2 22, BUN 11, creatinine 1.1, glucose 110, AST 22, ALT 13, troponin 0.73 (repeat 0.697)  -CT abdomen and pelvis had diverticulosis coli with mild circumferential wall thickening of the sigmoid colon without obvious pericolonic inflammatory fat stranding, fluid, or pneumoperitoneum.  Hepatomegaly and hepatic steatosis.  Stable left adrenal nodule compatible with an adenoma.  Atherosclerosis.  -chest x-ray had no evidence of acute radiographic abnormality.  -EKG showed sinus rhythm with PACs.  Septal infarct, age undetermined.  Prolonged QT.    Objective     Vitals: Temp: 97.3 °F (36.3 °C) (12/23/22 1136)  Pulse: 87 (12/23/22 1136)  Resp: 16 (12/23/22 1136)  BP: (!) 145/88 (12/23/22 1136)  SpO2: 96 % (12/23/22 1136)  Recent Labs: CBC:   Recent Labs   Lab 12/22/22  1611 12/23/22  0405   WBC 13.13* 10.23   HGB 15.4 13.9   HCT 45.8 41.9    294     CMP:   Recent Labs   Lab 12/22/22  1611 12/22/22  2347 12/23/22  0405 12/23/22  1202   * 129* 128* 130*   K 3.8 3.6 3.6 4.7   CL 89* 96 95 95   CO2 22* 21* 21* 24    111* 108 105   BUN 11 11 12 13   CREATININE 1.1 1.0 1.0 1.1   CALCIUM 9.9 9.2 9.1 9.4   PROT 8.2  --   --   --    ALBUMIN 4.4  --   --   --    BILITOT 1.3*  --   --   --    ALKPHOS 115  --   --   --    AST 22  --   --   --    ALT 13  --   --   --    ANIONGAP 15 12 12 11     Troponin:   Recent Labs   Lab 12/22/22  2347 12/23/22  0405 12/23/22  1202   TROPONINI 0.697* 0.648* 0.523*     Vent settings: Oxygen Concentration (%):  [28] 28   IV access:        Peripheral IV -  Single Lumen 12/22/22 1620 20 G Anterior;Left Forearm (Active)   Site Assessment Clean;Dry;Intact;No redness;No swelling 12/22/22 1627   Line Status Flushed;Blood return noted;Infusing 12/22/22 1627   Dressing Status Clean;Dry;Intact 12/22/22 1627   Dressing Intervention First dressing 12/22/22 1627       Allergies: Review of patient's allergies indicates:  No Known Allergies   NPO: Yes    Anticoagulation:   Anticoagulants       Ordered     Route Frequency Start Stop    12/1953  enoxaparin  (VTE Pharmacological Prophylaxis Orders - High Risk)         SubQ Every 12 hours 12/22/22 2100 --             Instructions    Patient going to cardiac cath lab  Admit to Hospital Medicine after procedure      Upon patient arrival to floor, please contact Hospital Medicine on call.     FEDERICO Singh MD  Hospital Medicine Staff  Cell: [phone]

## 2022-12-23 NOTE — CONSULTS
[facility]  Cardiology  Consult Note    Patient Name: Iris Mueller  MRN: [id number]  Admission Date: 2022  Hospital Length of Stay: 0 days  Code Status: Full Code   Attending Provider: Diallo Cablalero MD   Consulting Provider: Elkin Wright MD  Primary Care Physician: Elkin You MD  Principal Problem:Hyponatremia    Patient information was obtained from patient, past medical records, and ER records.     Consults  Subjective:     Chief Complaint:  Abdominal pain     HPI:  This is a overweight woman who smokes cigarettes who developed abdominal pain and was found to have elevated troponins  EKG showed new change with anterior septal down  No acute ST-T changes  Echo shows ejection fraction 40% anterior apical thinning and hypokinesis  She denies any chest pain  She is had no revascularization procedures  Treadmill several years ago was negative  She has hypertension and is morbidly obese    Past Medical History:   Diagnosis Date    Arthritis     Asthma     COPD (chronic obstructive pulmonary disease)     Encounter for blood transfusion     Hypertension     Wound infection 2019    Right knee       Past Surgical History:   Procedure Laterality Date     SECTION      JOINT REPLACEMENT      Knee    KNEE ARTHROPLASTY Right 2019    Procedure: ARTHROPLASTY, KNEE;  Surgeon: Delio Chung MD;  Location: [facility] OR;  Service: Orthopedics;  Laterality: Right;  anesthesia:  general and block    REPLACEMENT OF WOUND VACUUM-ASSISTED CLOSURE DEVICE Right 2019    Procedure: REPLACEMENT, WOUND VAC;  Surgeon: Delio Chung MD;  Location: [facility] OR;  Service: Orthopedics;  Laterality: Right;    TONSILLECTOMY         Review of patient's allergies indicates:  No Known Allergies    Current Facility-Administered Medications on File Prior to Encounter   Medication    lidocaine (PF) 10 mg/ml (1%) injection 5 mg     Current Outpatient Medications on File Prior to Encounter    Medication Sig    albuterol (PROVENTIL/VENTOLIN HFA) 90 mcg/actuation inhaler Inhale 2 puffs into the lungs every 6 (six) hours as needed for Wheezing. Rescue    ANORO ELLIPTA 62.5-25 mcg/actuation DsDv Inhale 1 puff into the lungs once daily.    atorvastatin (LIPITOR) 10 MG tablet Take 1 tablet (10 mg total) by mouth every evening.    buPROPion (WELLBUTRIN SR) 150 MG TBSR 12 hr tablet Take 1 tablet (150 mg total) by mouth 2 (two) times daily.    cetirizine HCl/pseudoephedrine (ZYRTEC-D ORAL) Take by mouth.    ferrous sulfate (FEOSOL) 325 mg (65 mg iron) Tab tablet Take 1 tablet (325 mg total) by mouth once daily.    FLUoxetine 40 MG capsule Take 1 capsule (40 mg total) by mouth once daily.    HYDROcodone-acetaminophen (NORCO)  mg per tablet Take 1 tablet by mouth every 8 (eight) hours as needed for Pain.    lisinopriL 10 MG tablet Take 1 tablet (10 mg total) by mouth once daily.    traZODone (DESYREL) 50 MG tablet Take 2 tablets (100 mg total) by mouth nightly as needed for Insomnia.    betamethasone dipropionate 0.05 % cream Apply topically 2 (two) times daily. (Patient not taking: Reported on 12/22/2022)    fluticasone propionate (FLONASE) 50 mcg/actuation nasal spray 1 spray (50 mcg total) by Each Nostril route once daily. (Patient not taking: Reported on 12/22/2022)    gabapentin (NEURONTIN) 300 MG capsule Take 1 capsule (300 mg total) by mouth 3 (three) times daily.    loratadine (CLARITIN) 10 mg tablet Take 10 mg by mouth once daily.    mupirocin (BACTROBAN) 2 % ointment Apply topically.    mupirocin (BACTROBAN) 2 % ointment Apply topically 3 (three) times daily. (Patient not taking: Reported on 12/22/2022)    triamcinolone acetonide 0.1% (KENALOG) 0.1 % cream Apply topically 2 (two) times daily. (Patient not taking: Reported on 12/22/2022)     Family History       Problem Relation (Age of Onset)    Alzheimer's disease Mother          Tobacco Use    Smoking status: Light Smoker     Packs/day: 0.50      Years: 30.00     Pack years: 15.00     Types: Cigarettes    Smokeless tobacco: Never   Substance and Sexual Activity    Alcohol use: Yes     Comment: RARELY    Drug use: Never    Sexual activity: Not Currently     ROS  Objective:     Vital Signs (Most Recent):  Temp: 97.3 °F (36.3 °C) (12/23/22 1136)  Pulse: 87 (12/23/22 1136)  Resp: 16 (12/23/22 1136)  BP: (!) 145/88 (12/23/22 1136)  SpO2: 96 % (12/23/22 1136)   Vital Signs (24h Range):  Temp:  [97.1 °F (36.2 °C)-98.3 °F (36.8 °C)] 97.3 °F (36.3 °C)  Pulse:  [78-94] 87  Resp:  [16-20] 16  SpO2:  [85 %-100 %] 96 %  BP: (127-202)/() 145/88     Weight: 113.9 kg (251 lb)  Body mass index is 45.91 kg/m².    SpO2: 96 %         Intake/Output Summary (Last 24 hours) at 12/23/2022 1223  Last data filed at 12/22/2022 1912  Gross per 24 hour   Intake 999 ml   Output --   Net 999 ml       Lines/Drains/Airways       Peripheral Intravenous Line  Duration                  Peripheral IV - Single Lumen 12/22/22 1620 20 G Anterior;Left Forearm <1 day                    Physical Exam overweight woman no distress  Blood pressure 145/80  Lungs are clear  Cardiac S4 2/6 systolic ejection murmur aortic area to the carotids  Abdomen obese mild tenderness  Extremities no flatus or edema dorsalis pedis pulses are palpable    Significant Labs: CMP   Recent Labs   Lab 12/22/22  1611 12/22/22  2347 12/23/22  0405   * 129* 128*   K 3.8 3.6 3.6   CL 89* 96 95   CO2 22* 21* 21*    111* 108   BUN 11 11 12   CREATININE 1.1 1.0 1.0   CALCIUM 9.9 9.2 9.1   PROT 8.2  --   --    ALBUMIN 4.4  --   --    BILITOT 1.3*  --   --    ALKPHOS 115  --   --    AST 22  --   --    ALT 13  --   --    ANIONGAP 15 12 12   , CBC   Recent Labs   Lab 12/22/22  1611 12/23/22  0405   WBC 13.13* 10.23   HGB 15.4 13.9   HCT 45.8 41.9    294   , and Troponin   Recent Labs   Lab 12/22/22  1611 12/22/22  2347 12/23/22  0405   TROPONINI 0.730* 0.697* 0.648*           VTE Risk Mitigation (From  admission, onward)           Ordered     enoxaparin injection 40 mg  Every 12 hours         12/1953     IP VTE HIGH RISK PATIENT  Once         12/1953     Place sequential compression device  Until discontinued         12/1953             Assessment and Plan:   1. Obesity   Atypical presentation for ASCVD  Non-STEMI with evidence of anterior apical damage suggesting subtotal occlusion of LAD  [place] moderate aortic stenosis diastolic dysfunction with elevation mean left atrial pressure    Patient is transfer to [facility] for cardiac catheterization  The estimated ejection fraction is 40%. There is a anterior apical segment that is hypo to akinetic with a wall motion abnormality  Grade II left ventricular diastolic dysfunction. Mean left atrial pressure 20 mm Hg. Mild mitral annular calcification  There are segmental left ventricular wall motion abnormalities.  Normal right ventricular size with normal right ventricular systolic function.  Moderate left atrial enlargement.  Moderate aortic regurgitation.  There is moderate aortic valve stenosis.  Aortic valve area is 1.55 cm2; peak velocity is 2.88 m/s; mean gradient is 15 mmHg.  Moderate mitral regurgitation.  There is mild pulmonary hypertension.  Mild tricuspid regurgitation.  Elevated central venous pressure (15 mmHg).  The estimated PA systolic pressure is 47 mmHg.         VTE Risk Mitigation (From admission, onward)           Ordered     enoxaparin injection 40 mg  Every 12 hours         12/1953     IP VTE HIGH RISK PATIENT  Once         12/1953     Place sequential compression device  Until discontinued         12/1953                I personally reviewed old and new ecg's, lab reports,, xray reports  and  other cardiovascular studies including  echo's, stress tests, angiogram reports, holters,and vascular studies .  In addition I evaluated original cardiac cath  ___echo  ____cxr ______ct ____scan on Vitrea view or  [employer]very[employer]onoragini or other viewing platforms .  I reviewed  office and hospital notes Yesx ____ of  referring providers.      Thank you for your consult.     Elkin Wright MD  Cardiology   [facility]

## 2022-12-23 NOTE — NURSING
Pt being transferred to cath lab at [facility]. Pt left safely with EMS. Personal belongings sent with spouse.

## 2022-12-23 NOTE — PROGRESS NOTES
Automatic Inhaler to Nebulizer Interchange    fluticasone/vilanterol (Breo Ellipta) 200 mcg/25 mcg changed to budesonide 1 mg twice daily AND arformoterol 15 mcg twice daily per [facility] Automatic Therapeutic Substitutions Protocol.    Please contact pharmacy at extension [phone] with any questions.     Thank you,   Riki Nunes

## 2022-12-23 NOTE — CONSULTS
INPATIENT NEPHROLOGY CONSULT   [facility]    Patient Name: Iris Mueller  Date: 12/23/2022    Reason for consultation: Hyponatremia    Chief Complaint:   Chief Complaint   Patient presents with    Abdominal Pain    Vomiting     Abd pain for the past several days -- was seen at [facility] and sent for further eval       History of Present Illness:  Iris Mueller is a 67-year-old female with a past medical history of hypertension, COPD, CKD III, depression and fibromyalgia presenting with abdominal pain, nausea and vomiting that started yesterday.  Patient states she has been unable to tolerate anything by mouth and denies blood in emesis.  Patient reports associated subjective fevers with headaches.  She was seen at urgent care and sent to the ED for further evaluation and workup.  CT abdomen revealed diverticulosis coli with mild circumferential wall thickening of the sigmoid colon without obvious pericolonic inflammatory fat stranding, fluid or, pneumoperitoneum.  Chest x-ray showed no acute abnormality.  Lab work demonstrated hyponatremia 126, creatinine 1.1, WBC 13.13 and elevated troponin 0.73.  COVID and flu negative. Consulted for hyponatremia.    Interval History:  12/23- hypertensive, UA bland, updated family at bedside    Plan of Care:    Assessment:  Hypovolemic hyponatremia superimposed on SIADH from n/v  Hypokalemia  Acidosis  HypoMg  HTN    Plan:    - serum Na is improved- stop IVFs since she is hypertensive- recheck labs this evening or in the AM  - remains NPO- still with nausea but no vomiting or diarrhea  - serum K is improved after repletion  - acidosis is resolved after IVFs  - ordered Mg repletion  - ok to continue lisinopril for BP control for now unless develops JEFF    Thank you for allowing us to participate in this patient's care. We will continue to follow.    Vital Signs:  Temp Readings from Last 3 Encounters:   12/23/22 97.1 °F (36.2 °C)    22 98.1 °F (36.7 °C)   22 98.2 °F (36.8 °C)       Pulse Readings from Last 3 Encounters:   22 87   22 89   22 75       BP Readings from Last 3 Encounters:   22 (!) 181/88   22 (!) 127/99   22 134/74       Weight:  Wt Readings from Last 3 Encounters:   22 113.9 kg (251 lb)   22 113.9 kg (251 lb)   22 121.7 kg (268 lb 3.2 oz)       INS/OUTS:  I/O last 3 completed shifts:  In: 999 [IV Piggyback:999]  Out: -   No intake/output data recorded.    Past Medical & Surgical History:  Past Medical History:   Diagnosis Date    Arthritis     Asthma     COPD (chronic obstructive pulmonary disease)     Encounter for blood transfusion     Hypertension     Wound infection 2019    Right knee       Past Surgical History:   Procedure Laterality Date     SECTION      JOINT REPLACEMENT      Knee    KNEE ARTHROPLASTY Right 2019    Procedure: ARTHROPLASTY, KNEE;  Surgeon: Delio Chung MD;  Location: [facility] OR;  Service: Orthopedics;  Laterality: Right;  anesthesia:  general and block    REPLACEMENT OF WOUND VACUUM-ASSISTED CLOSURE DEVICE Right 2019    Procedure: REPLACEMENT, WOUND VAC;  Surgeon: Delio Chung MD;  Location: [facility] OR;  Service: Orthopedics;  Laterality: Right;    TONSILLECTOMY         Past Social History:  Social History     Socioeconomic History    Marital status:    Tobacco Use    Smoking status: Light Smoker     Packs/day: 0.50     Years: 30.00     Pack years: 15.00     Types: Cigarettes    Smokeless tobacco: Never   Substance and Sexual Activity    Alcohol use: Yes     Comment: RARELY    Drug use: Never    Sexual activity: Not Currently       Medications:  Scheduled Meds:   atorvastatin  10 mg Oral QHS    buPROPion  150 mg Oral BID    enoxparin  40 mg Subcutaneous Q12H    lisinopriL  10 mg Oral Daily    senna-docusate 8.6-50 mg  1 tablet Oral BID     Continuous Infusions:   sodium chloride 0.9% 75 mL/hr at 22 0915      PRN Meds:.acetaminophen, albuterol-ipratropium, aluminum-magnesium hydroxide-simethicone, dextrose 10%, dextrose 10%, glucagon (human recombinant), glucose, glucose, HYDROcodone-acetaminophen, HYDROcodone-acetaminophen, melatonin, naloxone, ondansetron, prochlorperazine, simethicone, sodium chloride 0.9%, traZODone  Current Facility-Administered Medications on File Prior to Encounter   Medication Dose Route Frequency Provider Last Rate Last Admin    lidocaine (PF) 10 mg/ml (1%) injection 5 mg  0.5 mL Intradermal Once Azeem Anderson MD         Current Outpatient Medications on File Prior to Encounter   Medication Sig Dispense Refill    albuterol (PROVENTIL/VENTOLIN HFA) 90 mcg/actuation inhaler Inhale 2 puffs into the lungs every 6 (six) hours as needed for Wheezing. Rescue 18 g 2    ANORO ELLIPTA 62.5-25 mcg/actuation DsDv Inhale 1 puff into the lungs once daily. 60 each 5    atorvastatin (LIPITOR) 10 MG tablet Take 1 tablet (10 mg total) by mouth every evening. 90 tablet 1    buPROPion (WELLBUTRIN SR) 150 MG TBSR 12 hr tablet Take 1 tablet (150 mg total) by mouth 2 (two) times daily. 180 tablet 1    cetirizine HCl/pseudoephedrine (ZYRTEC-D ORAL) Take by mouth.      ferrous sulfate (FEOSOL) 325 mg (65 mg iron) Tab tablet Take 1 tablet (325 mg total) by mouth once daily.  0    FLUoxetine 40 MG capsule Take 1 capsule (40 mg total) by mouth once daily. 90 capsule 1    HYDROcodone-acetaminophen (NORCO)  mg per tablet Take 1 tablet by mouth every 8 (eight) hours as needed for Pain. 90 tablet 0    lisinopriL 10 MG tablet Take 1 tablet (10 mg total) by mouth once daily. 90 tablet 1    traZODone (DESYREL) 50 MG tablet Take 2 tablets (100 mg total) by mouth nightly as needed for Insomnia. 180 tablet 1    betamethasone dipropionate 0.05 % cream Apply topically 2 (two) times daily. (Patient not taking: Reported on 12/22/2022) 45 g 2    fluticasone propionate (FLONASE) 50 mcg/actuation nasal spray 1 spray (50 mcg  "total) by Each Nostril route once daily. (Patient not taking: Reported on 12/22/2022) 16 g 2    gabapentin (NEURONTIN) 300 MG capsule Take 1 capsule (300 mg total) by mouth 3 (three) times daily. 270 capsule 1    loratadine (CLARITIN) 10 mg tablet Take 10 mg by mouth once daily.      mupirocin (BACTROBAN) 2 % ointment Apply topically.      mupirocin (BACTROBAN) 2 % ointment Apply topically 3 (three) times daily. (Patient not taking: Reported on 12/22/2022) 30 g 0    triamcinolone acetonide 0.1% (KENALOG) 0.1 % cream Apply topically 2 (two) times daily. (Patient not taking: Reported on 12/22/2022) 45 g 2       Allergies:  Patient has no known allergies.    Past Family History:  Reviewed; refer to Hospitalist Admission Note    Review of Systems:  Review of Systems - All 14 systems reviewed and negative, except as noted in HPI    Physical Exam:  BP (!) 181/88   Pulse 87   Temp 97.1 °F (36.2 °C)   Resp 16   Ht 5' 2" (1.575 m)   Wt 113.9 kg (251 lb)   SpO2 97%   Breastfeeding No   BMI 45.91 kg/m²     General Appearance:    NAD, AAO x 3, cooperative, appears stated age   Head:    Normocephalic, atraumatic   Eyes:    PER, EOMI, and conjunctiva/sclera clear bilaterally        Mouth:   Moist mucus membranes, no thrush or oral lesions, normal      dentition   Back:     No CVA tenderness   Lungs:     Clear to auscultation bilaterally, no wheeze, crackles, rales      or rhonchi, symmetric air movement, respirations unlabored   Heart:    Regular rate and rhythm, S1 and S2 normal, no murmur, rub   or gallop   Abdomen:     Soft, non-tender, non-distended, bowel sounds active all four   quadrants, no RT or guarding, no masses, no organomegaly   Extremities:   Warm and well perfused, distal pulses intact, no cyanosis or    peripheral edema   MSK:   No joint or muscle swelling, tenderness or deformity   Skin:   Skin color, texture, turgor normal, no rashes or lesions   Neurologic/Psychiatric:   CNII-XII intact, normal " strength and sensation       throughout, no asterixis; normal affect, memory, judgement     and insight     Results:  Lab Results   Component Value Date     (L) 12/23/2022    K 3.6 12/23/2022    CL 95 12/23/2022    CO2 21 (L) 12/23/2022    BUN 12 12/23/2022    CREATININE 1.0 12/23/2022    CALCIUM 9.1 12/23/2022    ANIONGAP 12 12/23/2022    ESTGFRAFRICA 52 (L) 04/25/2022    EGFRNONAA 45 (L) 04/25/2022       Lab Results   Component Value Date    CALCIUM 9.1 12/23/2022    PHOS 4.1 09/05/2019       Recent Labs   Lab 12/23/22  0405   WBC 10.23   RBC 4.43   HGB 13.9   HCT 41.9      MCV 95   MCH 31.4*   MCHC 33.2       I have personally reviewed pertinent radiological imaging and reports.    I have spent > 70 minutes providing care for this patient for the above diagnoses. These services have included chart/data/imaging review, evaluation, exam, formulation of plan, , note preparation, and discussions with staff involved in this patient's care.    Brett Deng MD MPH  [facility] [address] [address]  [phone] (p)  [phone] (f)

## 2022-12-23 NOTE — PROGRESS NOTES
Automatic Inhaler to Nebulizer Interchange    fluticasone/vilanterol (Breo Ellipta) 200 mcg/25 mcg changed to budesonide 1 mg twice daily AND arformoterol 15 mcg twice daily per [facility] Automatic Therapeutic Substitutions Protocol.    Please contact pharmacy at extension [phone] with any questions.     Thank you,   Riki Nunes

## 2022-12-23 NOTE — RESPIRATORY THERAPY
12/22/22 2049 12/22/22 2051   Patient Assessment/Suction   Level of Consciousness (AVPU) alert alert   Respiratory Effort Unlabored Unlabored   PRE-TX-O2   Device (Oxygen Therapy) room air nasal cannula   $ Is the patient on Low Flow Oxygen?  --  Yes   Flow (L/min)  --  3   SpO2 (!) 85 % 98 %   Pulse Oximetry Type Continuous Continuous   $ Pulse Oximetry - Multiple Charge Pulse Oximetry - Multiple Pulse Oximetry - Multiple   Pulse 88 89     Pt was found satting 85% on room air, RT placed pt on 3L NC and encouraged pt to take some deep breathes. Sats came up to 98% on 3L NC.

## 2022-12-23 NOTE — HOSPITAL COURSE
Admitted overnight with complaints of abdominal pain, nausea, and vomiting found to have hyponatremia and elevated troponins. Overall stable with some elevated blood pressures. Started IV NS with serial BMPs. Troponins trended and remained similarly as elevated. EKG no STEMI but concerns for septal infarct, age indeterminate. Cardiology and nephrology were consulted. Echo was done showing EF 40%, G2DD, and a concerning wall motion abnormality. Dr. Wright with cardiology recommended the patient be transferred for a cardiac catheterization preferably urgently. Cardiology at [facility] Dr. Tran agreed to transfer and procedure. Accepted by hospital medicine for admission at [facility]. Already received lovenox so was given an aspirin. Had been NPO. Patient seen and examined on day of discharge.    Physical exam on day of discharge:  General - Patient alert and oriented x3 in NAD  CV - Regular rate and rhythm, No Murmur/noe/rubs  Resp - Lungs CTA Bilaterally, No increased WOB  GI - BS normoactive, soft, non-distended, minimal tenderness LUQ and LLQ, no guarding, no HSM  Extrem-  No cyanosis, clubbing, edema.   Skin -  No masses, rashes or lesions noted on cursory skin exam.

## 2022-12-23 NOTE — SUBJECTIVE & OBJECTIVE
Past Medical History:   Diagnosis Date    Arthritis     Asthma     COPD (chronic obstructive pulmonary disease)     Encounter for blood transfusion     Hypertension     Wound infection 2019    Right knee       Past Surgical History:   Procedure Laterality Date     SECTION      JOINT REPLACEMENT      Knee    KNEE ARTHROPLASTY Right 2019    Procedure: ARTHROPLASTY, KNEE;  Surgeon: Delio Chung MD;  Location: [facility] OR;  Service: Orthopedics;  Laterality: Right;  anesthesia:  general and block    REPLACEMENT OF WOUND VACUUM-ASSISTED CLOSURE DEVICE Right 2019    Procedure: REPLACEMENT, WOUND VAC;  Surgeon: Delio Chung MD;  Location: [facility] OR;  Service: Orthopedics;  Laterality: Right;    TONSILLECTOMY         Review of patient's allergies indicates:  No Known Allergies    Current Facility-Administered Medications on File Prior to Encounter   Medication    lidocaine (PF) 10 mg/ml (1%) injection 5 mg     Current Outpatient Medications on File Prior to Encounter   Medication Sig    albuterol (PROVENTIL/VENTOLIN HFA) 90 mcg/actuation inhaler Inhale 2 puffs into the lungs every 6 (six) hours as needed for Wheezing. Rescue    ANORO ELLIPTA 62.5-25 mcg/actuation DsDv Inhale 1 puff into the lungs once daily.    atorvastatin (LIPITOR) 10 MG tablet Take 1 tablet (10 mg total) by mouth every evening.    betamethasone dipropionate 0.05 % cream Apply topically 2 (two) times daily. (Patient not taking: Reported on 2022)    buPROPion (WELLBUTRIN SR) 150 MG TBSR 12 hr tablet Take 1 tablet (150 mg total) by mouth 2 (two) times daily.    cetirizine HCl/pseudoephedrine (ZYRTEC-D ORAL) Take by mouth.    ferrous sulfate (FEOSOL) 325 mg (65 mg iron) Tab tablet Take 1 tablet (325 mg total) by mouth once daily.    FLUoxetine 40 MG capsule Take 1 capsule (40 mg total) by mouth once daily.    fluticasone propionate (FLONASE) 50 mcg/actuation nasal spray 1 spray (50 mcg total) by Each Nostril route once daily.  (Patient not taking: Reported on 12/22/2022)    gabapentin (NEURONTIN) 300 MG capsule Take 1 capsule (300 mg total) by mouth 3 (three) times daily.    HYDROcodone-acetaminophen (NORCO)  mg per tablet Take 1 tablet by mouth every 8 (eight) hours as needed for Pain.    lisinopriL 10 MG tablet Take 1 tablet (10 mg total) by mouth once daily.    loratadine (CLARITIN) 10 mg tablet Take 10 mg by mouth once daily.    mupirocin (BACTROBAN) 2 % ointment Apply topically.    mupirocin (BACTROBAN) 2 % ointment Apply topically 3 (three) times daily. (Patient not taking: Reported on 12/22/2022)    traZODone (DESYREL) 50 MG tablet Take 2 tablets (100 mg total) by mouth nightly as needed for Insomnia.    triamcinolone acetonide 0.1% (KENALOG) 0.1 % cream Apply topically 2 (two) times daily. (Patient not taking: Reported on 12/22/2022)    [DISCONTINUED] HYDROcodone-acetaminophen (NORCO)  mg per tablet Take 1 tablet by mouth every 8 (eight) hours as needed for Pain.    [DISCONTINUED] HYDROcodone-acetaminophen (NORCO)  mg per tablet Take 1 tablet by mouth every 8 (eight) hours as needed for Pain.    [DISCONTINUED] HYDROcodone-acetaminophen (NORCO)  mg per tablet Take 1 tablet by mouth every 8 (eight) hours as needed for Pain.    [DISCONTINUED] HYDROcodone-acetaminophen (NORCO)  mg per tablet Take 1 tablet by mouth every 8 (eight) hours as needed for Pain.    [DISCONTINUED] HYDROcodone-acetaminophen (NORCO)  mg per tablet Take 1 tablet by mouth every 8 (eight) hours as needed for Pain.     Family History       Problem Relation (Age of Onset)    Alzheimer's disease Mother          Tobacco Use    Smoking status: Light Smoker     Packs/day: 0.50     Years: 30.00     Pack years: 15.00     Types: Cigarettes    Smokeless tobacco: Never   Substance and Sexual Activity    Alcohol use: Yes     Comment: RARELY    Drug use: Never    Sexual activity: Not Currently     Review of Systems   Constitutional:   Positive for fever (subjective).   Respiratory:  Positive for shortness of breath (with exertion (chronic)).    Gastrointestinal:  Positive for abdominal pain, nausea and vomiting.   Neurological:  Positive for headaches.   Objective:     Vital Signs (Most Recent):  Temp: 98.3 °F (36.8 °C) (12/22/22 1928)  Pulse: 78 (12/22/22 1811)  Resp: 16 (12/22/22 1629)  BP: 133/61 (12/22/22 1811)  SpO2: 100 % (12/22/22 1811) Vital Signs (24h Range):  Temp:  [98 °F (36.7 °C)-98.3 °F (36.8 °C)] 98.3 °F (36.8 °C)  Pulse:  [78-89] 78  Resp:  [16-20] 16  SpO2:  [85 %-100 %] 100 %  BP: (127-200)/(61-99) 133/61     Weight: 113.9 kg (251 lb)  Body mass index is 45.91 kg/m².    Physical Exam  Vitals reviewed.   Constitutional:       Appearance: Normal appearance. She is obese.   HENT:      Head: Normocephalic.      Mouth/Throat:      Mouth: Mucous membranes are moist.      Pharynx: Oropharynx is clear.   Eyes:      Pupils: Pupils are equal, round, and reactive to light.   Cardiovascular:      Rate and Rhythm: Normal rate and regular rhythm.      Pulses: Normal pulses.      Heart sounds: Normal heart sounds.   Pulmonary:      Effort: Pulmonary effort is normal.      Breath sounds: Examination of the right-lower field reveals decreased breath sounds. Examination of the left-lower field reveals decreased breath sounds. Decreased breath sounds present.   Abdominal:      General: Bowel sounds are normal.      Palpations: Abdomen is soft.      Tenderness: There is abdominal tenderness in the left upper quadrant and left lower quadrant.   Musculoskeletal:         General: Normal range of motion.      Cervical back: Normal range of motion.   Skin:     General: Skin is warm and dry.   Neurological:      Mental Status: She is alert and oriented to person, place, and time. Mental status is at baseline.   Psychiatric:         Mood and Affect: Mood normal.         Speech: Speech normal.         Behavior: Behavior normal.         CRANIAL NERVES      CN III, IV, VI   Pupils are equal, round, and reactive to light.     Significant Labs: All pertinent labs within the past 24 hours have been reviewed.  CBC:   Recent Labs   Lab 12/22/22  1611   WBC 13.13*   HGB 15.4   HCT 45.8        CMP:   Recent Labs   Lab 12/22/22  1611   *   K 3.8   CL 89*   CO2 22*      BUN 11   CREATININE 1.1   CALCIUM 9.9   PROT 8.2   ALBUMIN 4.4   BILITOT 1.3*   ALKPHOS 115   AST 22   ALT 13   ANIONGAP 15       Significant Imaging: I have reviewed all pertinent imaging results/findings within the past 24 hours.  Imaging Results              CT Abdomen Pelvis With Contrast (Final result)  Result time 12/22/22 18:28:49      Final result by Pranav Torres MD (12/22/22 18:28:49)                   Impression:      1. Diverticulosis coli with mild circumferential wall thickening of the sigmoid colon without obvious pericolonic inflammatory fat stranding, fluid or, pneumoperitoneum, nonspecific and may be secondary to incomplete distension versus early/developing or resolving diverticulitis. Otherwise, no other acute abnormality identified within the abdomen or pelvis.  2. Hepatomegaly and hepatic steatosis.  3. Stable left adrenal nodule compatible with an adenoma.  4. Atherosclerosis.  5. Additional findings, as above.      Electronically signed by: Pranav Torres  Date:    12/22/2022  Time:    18:28               Narrative:    EXAMINATION:  CT ABDOMEN PELVIS WITH CONTRAST    CLINICAL HISTORY:  Nausea/vomiting;    TECHNIQUE:  Low dose axial images, sagittal and coronal reformations were obtained from the lung bases to the pubic symphysis following the IV administration of 100 mL of Omnipaque 350 .  Oral contrast was not administered.    COMPARISON:  CT chest 09/22/2020    FINDINGS:  Abdomen:    - Lower thorax:Heart is normal in size.  No pericardial effusion.  Calcification of the coronary arteries.    - Lung bases: Scattered bandlike densities at both lung bases suggestive of  subsegmental atelectasis or scarring.  No pleural effusion.    - Liver: Liver is enlarged and measures 22 cm.  Hepatic parenchyma is diffusely decreased in attenuation most compatible with fatty infiltration.  Scattered subcentimeter calcifications along superior aspect of the left hepatic lobe.  No focal suspicious hepatic mass.    - Gallbladder: Gallbladder is distended.  No calcified gallstones. No pericholecystic inflammatory change.    - Bile Ducts: No evidence of intra or extra hepatic biliary ductal dilation.    - Pancreas: No mass or peripancreatic fat stranding.    - Stomach: Small hiatal hernia.  No acute abnormality.    - Spleen: Normal in size and attenuation.  No focal lesion.  Small accessory splenule noted.    - Adrenals: Stable-appearing 1.8 cm hypodense left adrenal nodule most compatible with an adenoma, unchanged in size since 2020.  Right adrenal gland appears within normal limits.    - Kidneys: Kidneys are normal in size and enhance appropriately. No enhancing mass or hydronephrosis. Exophytic round 1.8 cm hypodensity arising from the midpole of the left kidney, most compatible with a cyst.  Additional subcentimeter bilateral renal cortical hypodensities, too small to characterize, probably cysts.    - Bladder: Incompletely distended, limiting its evaluation.    - Reproductive: No significant abnormality.    - Bowel/Mesentery: Small incidental duodenal diverticulum.  Numerous scattered colonic diverticula.  Mild circumferential wall thickening of the sigmoid colon, nonspecific and may be secondary to incomplete distension.  No obvious pericolonic inflammatory fat stranding, fluid or pneumoperitoneum.    No evidence of appendicitis.    - Lymph nodes: No pathologically enlarged lymph nodes.    - Vascular: Prominent calcific atherosclerosis of the abdominal aorta and branch vessels.  No abdominal aortic aneurysm.    - Abdominal Wall/Soft Tissues: Small fat-containing umbilical hernia.    - Bones:  Age-appropriate degenerative changes.  No acute osseous abnormality and no suspicious lytic or blastic lesion.                                       X-Ray Chest 1 View (Final result)  Result time 12/22/22 18:08:35      Final result by Pranav Torres MD (12/22/22 18:08:35)                   Impression:      No evidence of an acute radiographic abnormality.      Electronically signed by: Pranav Torres  Date:    12/22/2022  Time:    18:08               Narrative:    EXAMINATION:  XR CHEST 1 VIEW    CLINICAL HISTORY:  Hypoxia;    TECHNIQUE:  Single frontal view of the chest was performed.    COMPARISON:  09/14/2019    FINDINGS:  Cardiomediastinal silhouette is within normal limits.  Calcific plaque projects over the thoracic aorta.  Thin bandlike opacity projects over the left lower lung suggestive of subsegmental atelectasis versus scarring.  No definite consolidation, pleural effusion, or pneumothorax.  No acute osseous abnormality.

## 2022-12-23 NOTE — HPI
Iris Mueller is a 67-year-old female with a past medical history of hypertension, COPD, CKD, depression and fibromyalgia presenting with abdominal pain, nausea and vomiting that started yesterday.  Patient states she has been unable to tolerate anything by mouth and denies blood in emesis.  Patient reports associated subjective fevers with headaches.  She was seen at urgent care and sent to the ED for further evaluation and workup.  CT abdomen revealed diverticulosis coli with mild circumferential wall thickening of the sigmoid colon without obvious pericolonic inflammatory fat stranding, fluid or, pneumoperitoneum.  Chest x-ray showed no acute abnormality.  Lab work demonstrated hyponatremia 126, creatinine 1.1, WBC 13.13 and elevated troponin 0.73.  COVID and flu negative.  EKG revealed sinus rhythm with PAC's.  ED provider spoke to Dr. Lara, cardiologist, who agreed to consult and patient referred to Hospital Medicine.  Patient seen in the ED with  at bedside.  She denies chest pain and is complaining of acute generalized abdominal pain and shortness of breath with exertion that she states is chronic.  Patient has had no episodes of found since arriving to the ED and has remained afebrile.  Patient denies diarrhea, dysuria,and hematuria. Patient will be admitted to Hospital Medicine for further evaluation and management.

## 2022-12-23 NOTE — ASSESSMENT & PLAN NOTE
Chronic, controlled.  Latest blood pressure and vitals reviewed-   Temp:  [98 °F (36.7 °C)-98.3 °F (36.8 °C)]   Pulse:  [78-89]   Resp:  [16-20]   BP: (127-200)/(61-99)   SpO2:  [85 %-100 %] .   Home meds for hypertension were reviewed and noted below.   Hypertension Medications             lisinopriL 10 MG tablet Take 1 tablet (10 mg total) by mouth once daily.          While in the hospital, will manage blood pressure as follows; Continue home antihypertensive regimen    Will utilize p.r.n. blood pressure medication only if patient's blood pressure greater than  180/110 and she develops symptoms such as worsening chest pain or shortness of breath.

## 2022-12-23 NOTE — ASSESSMENT & PLAN NOTE
Patient's COPD is controlled currently.  Patient is currently off COPD Pathway. Continue scheduled inhalers Supplemental oxygen PRN and monitor respiratory status closely.     -duo nebs prn

## 2022-12-23 NOTE — ANESTHESIA PROCEDURE NOTES
Arterial    Diagnosis: required repeated lab drawn    Patient location during procedure: ICU  Procedure start time: 12/23/2022 4:35 PM  Timeout: 12/23/2022 4:30 PM  Procedure end time: 12/23/2022 4:45 PM    Staffing  Authorizing Provider: Donovan Martinez CRNA  Performing Provider: Donovan Martinez CRNA      Preanesthetic Checklist  Completed: patient identified, IV checked, site marked, risks and benefits discussed, surgical consent, monitors and equipment checked, pre-op evaluation, timeout performed and anesthesia consent givenArterial  Skin Prep: alcohol swabs  Local Infiltration: lidocaine  Orientation: left  Location: radial    Catheter Size: 20 G  Catheter placement by Ultrasound guidance. Heme positive aspiration all ports.   Vessel Caliber: small, medium, patent, compressibility normal  Vascular Doppler:  not done  Needle advanced into vessel with real time Ultrasound guidance.  Sterile sheath used.Insertion Attempts: 1  Assessment  Dressing: tegaderm and sutured in place and taped  Patient: Tolerated well

## 2022-12-23 NOTE — PLAN OF CARE
Patient transferring to [facility].       12/23/22 1301   Final Note   Assessment Type Final Discharge Note   Anticipated Discharge Disposition Short Term

## 2022-12-23 NOTE — ASSESSMENT & PLAN NOTE
Patient is chronically on statin.will continue for now. Monitor clinically. Last LDL was   Lab Results   Component Value Date    LDLCALC 82 11/07/2022

## 2022-12-23 NOTE — PLAN OF CARE
Discharge assessment complete with Barbara Ribeiro (Daughter) [phone] (Mobile) on the phone.    Formerly [facility], [facility]  Initial Discharge Assessment       Primary Care Provider: Elkin You MD    Admission Diagnosis: NSTEMI (non-ST elevated myocardial infarction) [I21.4]    Admission Date: 12/23/2022  Expected Discharge Date:     Discharge Barriers Identified: (P) None    Payor: [employer] / Plan: [employer] CHOICES 65 / Product Type: Medicare Advantage /     Extended Emergency Contact Information  Primary Emergency Contact: Barbara Ribeiro  Work Phone: [phone]  Mobile Phone: [phone]  Relation: Daughter   needed? No    Discharge Plan A: (P) Home with family  Discharge Plan B: (P) Home with family      [id number] - [place], [place] - [address] AT [facility] &   [address]  [facility] [address]  Phone: [phone] Fax: [phone]      Initial Assessment (most recent)       Adult Discharge Assessment - 12/23/22 1557          Discharge Assessment    Assessment Type Discharge Planning Assessment (P)      Confirmed/corrected address, phone number and insurance Yes (P)      Confirmed Demographics Correct on Facesheet (P)      Source of Information family (P)      Communicated VALERIANO with patient/caregiver No (P)      Reason For Admission diverticulitis (P)      People in Home significant other (P)      Facility Arrived From: [facility] (P)      Do you expect to return to your current living situation? Yes (P)      Do you have help at home or someone to help you manage your care at home? Yes (P)      Who are your caregiver(s) and their phone number(s)? Barbara Ribeiro (Daughter)   [phone] (Mobile) (P)      Prior to hospitilization cognitive status: Unable to Assess (P)      Current cognitive status: Unable to Assess (P)      Walking or Climbing Stairs ambulation difficulty, requires equipment (P)      Equipment  Currently Used at Home walker, rolling (P)      Readmission within 30 days? No (P)      Patient currently being followed by outpatient case management? No (P)      Do you currently have service(s) that help you manage your care at home? No (P)      Do you take prescription medications? Yes (P)      Do you have prescription coverage? Yes (P)      Coverage [facility] (P)      Do you have any problems affording any of your prescribed medications? No (P)      Who is going to help you get home at discharge? Quintin, Barbara (Daughter)   [phone] (Mobile) or Spouse (P)      How do you get to doctors appointments? family or friend will provide (P)      Are you on dialysis? No (P)      Do you take coumadin? No (P)      Discharge Plan A Home with family (P)      Discharge Plan B Home with family (P)      DME Needed Upon Discharge  none (P)      Discharge Plan discussed with: Adult children (P)      Discharge Barriers Identified None (P)

## 2022-12-23 NOTE — NURSING
"Resting with closed eyes. Awakes easily to verbal stimuli. States "I feel better now, thank you," when asked how she is feeling now. Good historian. Moves all extremities. Denies any complaints at this time.  "

## 2022-12-23 NOTE — ASSESSMENT & PLAN NOTE
Na+126    -Consult Nephrology  -IV NS  -Trend sodium  -Urinalysis pending  -Urine sodium & osmo pending  -Serum osmo pending

## 2022-12-24 PROBLEM — K82.9 GALLBLADDER DISORDER: Status: ACTIVE | Noted: 2022-12-24

## 2022-12-24 LAB
ALBUMIN SERPL BCP-MCNC: 3.1 G/DL (ref 3.5–5.2)
ALP SERPL-CCNC: 63 U/L (ref 55–135)
ALT SERPL W/O P-5'-P-CCNC: 12 U/L (ref 10–44)
ANION GAP SERPL CALC-SCNC: 6 MMOL/L (ref 8–16)
AST SERPL-CCNC: 11 U/L (ref 10–40)
BACTERIA #/AREA URNS HPF: NEGATIVE /HPF
BACTERIA #/AREA URNS HPF: NEGATIVE /HPF
BASOPHILS # BLD AUTO: 0.02 K/UL (ref 0–0.2)
BASOPHILS # BLD AUTO: 0.04 K/UL (ref 0–0.2)
BASOPHILS NFR BLD: 0.2 % (ref 0–1.9)
BASOPHILS NFR BLD: 0.4 % (ref 0–1.9)
BILIRUB SERPL-MCNC: 1.3 MG/DL (ref 0.1–1)
BILIRUB UR QL STRIP: ABNORMAL
BILIRUB UR QL STRIP: ABNORMAL
BNP SERPL-MCNC: 928 PG/ML (ref 0–99)
BUN SERPL-MCNC: 20 MG/DL (ref 8–23)
CALCIUM SERPL-MCNC: 8.3 MG/DL (ref 8.7–10.5)
CHLORIDE SERPL-SCNC: 97 MMOL/L (ref 95–110)
CLARITY UR: CLEAR
CLARITY UR: CLEAR
CO2 SERPL-SCNC: 25 MMOL/L (ref 23–29)
COLOR UR: YELLOW
COLOR UR: YELLOW
CREAT SERPL-MCNC: 1.7 MG/DL (ref 0.5–1.4)
DIFFERENTIAL METHOD: ABNORMAL
DIFFERENTIAL METHOD: ABNORMAL
EOSINOPHIL # BLD AUTO: 0 K/UL (ref 0–0.5)
EOSINOPHIL # BLD AUTO: 0.1 K/UL (ref 0–0.5)
EOSINOPHIL NFR BLD: 0.2 % (ref 0–8)
EOSINOPHIL NFR BLD: 0.7 % (ref 0–8)
ERYTHROCYTE [DISTWIDTH] IN BLOOD BY AUTOMATED COUNT: 14.6 % (ref 11.5–14.5)
ERYTHROCYTE [DISTWIDTH] IN BLOOD BY AUTOMATED COUNT: 14.6 % (ref 11.5–14.5)
EST. GFR  (NO RACE VARIABLE): 32.3 ML/MIN/1.73 M^2
ESTIMATED AVG GLUCOSE: 103 MG/DL (ref 68–131)
GLUCOSE SERPL-MCNC: 92 MG/DL (ref 70–110)
GLUCOSE UR QL STRIP: NEGATIVE
GLUCOSE UR QL STRIP: NEGATIVE
HBA1C MFR BLD: 5.2 % (ref 4.5–6.2)
HCT VFR BLD AUTO: 36.2 % (ref 37–48.5)
HCT VFR BLD AUTO: 37.6 % (ref 37–48.5)
HGB BLD-MCNC: 12 G/DL (ref 12–16)
HGB BLD-MCNC: 12.5 G/DL (ref 12–16)
HGB UR QL STRIP: ABNORMAL
HGB UR QL STRIP: ABNORMAL
HYALINE CASTS #/AREA URNS LPF: 38 /LPF
HYALINE CASTS #/AREA URNS LPF: 38 /LPF
IMM GRANULOCYTES # BLD AUTO: 0.04 K/UL (ref 0–0.04)
IMM GRANULOCYTES # BLD AUTO: 0.05 K/UL (ref 0–0.04)
IMM GRANULOCYTES NFR BLD AUTO: 0.5 % (ref 0–0.5)
IMM GRANULOCYTES NFR BLD AUTO: 0.6 % (ref 0–0.5)
KETONES UR QL STRIP: ABNORMAL
KETONES UR QL STRIP: ABNORMAL
LACTATE SERPL-SCNC: 0.7 MMOL/L (ref 0.5–1.9)
LEUKOCYTE ESTERASE UR QL STRIP: NEGATIVE
LEUKOCYTE ESTERASE UR QL STRIP: NEGATIVE
LYMPHOCYTES # BLD AUTO: 0.9 K/UL (ref 1–4.8)
LYMPHOCYTES # BLD AUTO: 1 K/UL (ref 1–4.8)
LYMPHOCYTES NFR BLD: 11.1 % (ref 18–48)
LYMPHOCYTES NFR BLD: 11.6 % (ref 18–48)
MAGNESIUM SERPL-MCNC: 1.6 MG/DL (ref 1.6–2.6)
MCH RBC QN AUTO: 31.4 PG (ref 27–31)
MCH RBC QN AUTO: 31.6 PG (ref 27–31)
MCHC RBC AUTO-ENTMCNC: 33.1 G/DL (ref 32–36)
MCHC RBC AUTO-ENTMCNC: 33.2 G/DL (ref 32–36)
MCV RBC AUTO: 95 FL (ref 82–98)
MCV RBC AUTO: 95 FL (ref 82–98)
MICROSCOPIC COMMENT: ABNORMAL
MICROSCOPIC COMMENT: ABNORMAL
MONOCYTES # BLD AUTO: 0.7 K/UL (ref 0.3–1)
MONOCYTES # BLD AUTO: 0.9 K/UL (ref 0.3–1)
MONOCYTES NFR BLD: 8.4 % (ref 4–15)
MONOCYTES NFR BLD: 9.6 % (ref 4–15)
NEUTROPHILS # BLD AUTO: 6.5 K/UL (ref 1.8–7.7)
NEUTROPHILS # BLD AUTO: 6.9 K/UL (ref 1.8–7.7)
NEUTROPHILS NFR BLD: 77.1 % (ref 38–73)
NEUTROPHILS NFR BLD: 79.6 % (ref 38–73)
NITRITE UR QL STRIP: NEGATIVE
NITRITE UR QL STRIP: NEGATIVE
NRBC BLD-RTO: 0 /100 WBC
NRBC BLD-RTO: 0 /100 WBC
PH UR STRIP: 7 [PH] (ref 5–8)
PH UR STRIP: 7 [PH] (ref 5–8)
PLATELET # BLD AUTO: 246 K/UL (ref 150–450)
PLATELET # BLD AUTO: 280 K/UL (ref 150–450)
PMV BLD AUTO: 9.1 FL (ref 9.2–12.9)
PMV BLD AUTO: 9.4 FL (ref 9.2–12.9)
POTASSIUM SERPL-SCNC: 3 MMOL/L (ref 3.5–5.1)
PROCALCITONIN SERPL IA-MCNC: <0.05 NG/ML (ref 0–0.5)
PROT SERPL-MCNC: 5.5 G/DL (ref 6–8.4)
PROT UR QL STRIP: ABNORMAL
PROT UR QL STRIP: ABNORMAL
RBC # BLD AUTO: 3.82 M/UL (ref 4–5.4)
RBC # BLD AUTO: 3.96 M/UL (ref 4–5.4)
RBC #/AREA URNS HPF: 9 /HPF (ref 0–4)
RBC #/AREA URNS HPF: 9 /HPF (ref 0–4)
SODIUM SERPL-SCNC: 128 MMOL/L (ref 136–145)
SP GR UR STRIP: >1.03 (ref 1–1.03)
SP GR UR STRIP: >1.03 (ref 1–1.03)
SQUAMOUS #/AREA URNS HPF: 8 /HPF
SQUAMOUS #/AREA URNS HPF: 8 /HPF
TROPONIN I SERPL HS-MCNC: 292.9 PG/ML (ref 0–14.9)
URN SPEC COLLECT METH UR: ABNORMAL
URN SPEC COLLECT METH UR: ABNORMAL
UROBILINOGEN UR STRIP-ACNC: NEGATIVE EU/DL
UROBILINOGEN UR STRIP-ACNC: NEGATIVE EU/DL
WBC # BLD AUTO: 8.12 K/UL (ref 3.9–12.7)
WBC # BLD AUTO: 8.98 K/UL (ref 3.9–12.7)
WBC #/AREA URNS HPF: 12 /HPF (ref 0–5)
WBC #/AREA URNS HPF: 12 /HPF (ref 0–5)

## 2022-12-24 PROCEDURE — 63600175 PHARM REV CODE 636 W HCPCS: Performed by: FAMILY MEDICINE

## 2022-12-24 PROCEDURE — 25000003 PHARM REV CODE 250: Performed by: INTERNAL MEDICINE

## 2022-12-24 PROCEDURE — 36410 VNPNXR 3YR/> PHY/QHP DX/THER: CPT

## 2022-12-24 PROCEDURE — 84145 PROCALCITONIN (PCT): CPT | Performed by: FAMILY MEDICINE

## 2022-12-24 PROCEDURE — 93010 ELECTROCARDIOGRAM REPORT: CPT | Mod: ,,, | Performed by: INTERNAL MEDICINE

## 2022-12-24 PROCEDURE — 99900035 HC TECH TIME PER 15 MIN (STAT)

## 2022-12-24 PROCEDURE — 93010 EKG 12-LEAD: ICD-10-PCS | Mod: ,,, | Performed by: INTERNAL MEDICINE

## 2022-12-24 PROCEDURE — 81001 URINALYSIS AUTO W/SCOPE: CPT | Performed by: FAMILY MEDICINE

## 2022-12-24 PROCEDURE — C9113 INJ PANTOPRAZOLE SODIUM, VIA: HCPCS | Performed by: FAMILY MEDICINE

## 2022-12-24 PROCEDURE — 25000003 PHARM REV CODE 250: Performed by: FAMILY MEDICINE

## 2022-12-24 PROCEDURE — 84484 ASSAY OF TROPONIN QUANT: CPT | Performed by: FAMILY MEDICINE

## 2022-12-24 PROCEDURE — 83880 ASSAY OF NATRIURETIC PEPTIDE: CPT | Performed by: FAMILY MEDICINE

## 2022-12-24 PROCEDURE — 20000000 HC ICU ROOM

## 2022-12-24 PROCEDURE — 27000221 HC OXYGEN, UP TO 24 HOURS

## 2022-12-24 PROCEDURE — 99900031 HC PATIENT EDUCATION (STAT)

## 2022-12-24 PROCEDURE — 51798 US URINE CAPACITY MEASURE: CPT

## 2022-12-24 PROCEDURE — 94761 N-INVAS EAR/PLS OXIMETRY MLT: CPT

## 2022-12-24 PROCEDURE — 94799 UNLISTED PULMONARY SVC/PX: CPT

## 2022-12-24 PROCEDURE — 83605 ASSAY OF LACTIC ACID: CPT | Performed by: FAMILY MEDICINE

## 2022-12-24 PROCEDURE — 94640 AIRWAY INHALATION TREATMENT: CPT

## 2022-12-24 PROCEDURE — 93005 ELECTROCARDIOGRAM TRACING: CPT | Performed by: INTERNAL MEDICINE

## 2022-12-24 PROCEDURE — 83735 ASSAY OF MAGNESIUM: CPT | Performed by: FAMILY MEDICINE

## 2022-12-24 PROCEDURE — 87086 URINE CULTURE/COLONY COUNT: CPT | Performed by: FAMILY MEDICINE

## 2022-12-24 PROCEDURE — 36620 INSERTION CATHETER ARTERY: CPT

## 2022-12-24 PROCEDURE — 25000242 PHARM REV CODE 250 ALT 637 W/ HCPCS: Performed by: FAMILY MEDICINE

## 2022-12-24 PROCEDURE — 80053 COMPREHEN METABOLIC PANEL: CPT | Performed by: FAMILY MEDICINE

## 2022-12-24 PROCEDURE — 85025 COMPLETE CBC W/AUTO DIFF WBC: CPT | Performed by: FAMILY MEDICINE

## 2022-12-24 RX ORDER — NOREPINEPHRINE BITARTRATE/D5W 4MG/250ML
0-3 PLASTIC BAG, INJECTION (ML) INTRAVENOUS CONTINUOUS
Status: DISCONTINUED | OUTPATIENT
Start: 2022-12-24 | End: 2022-12-26

## 2022-12-24 RX ORDER — LIDOCAINE HYDROCHLORIDE 10 MG/ML
INJECTION, SOLUTION EPIDURAL; INFILTRATION; INTRACAUDAL; PERINEURAL
Status: DISPENSED
Start: 2022-12-24 | End: 2022-12-24

## 2022-12-24 RX ADMIN — SUCRALFATE 1 G: 1 SUSPENSION ORAL at 06:12

## 2022-12-24 RX ADMIN — ARFORMOTEROL TARTRATE 15 MCG: 15 SOLUTION RESPIRATORY (INHALATION) at 07:12

## 2022-12-24 RX ADMIN — MAGNESIUM SULFATE HEPTAHYDRATE 2 G: 40 INJECTION, SOLUTION INTRAVENOUS at 04:12

## 2022-12-24 RX ADMIN — SUCRALFATE 1 G: 1 SUSPENSION ORAL at 12:12

## 2022-12-24 RX ADMIN — SODIUM CHLORIDE: 0.9 INJECTION, SOLUTION INTRAVENOUS at 04:12

## 2022-12-24 RX ADMIN — POTASSIUM CHLORIDE 40 MEQ: 7.46 INJECTION, SOLUTION INTRAVENOUS at 04:12

## 2022-12-24 RX ADMIN — PANTOPRAZOLE SODIUM 40 MG: 40 INJECTION, POWDER, FOR SOLUTION INTRAVENOUS at 09:12

## 2022-12-24 RX ADMIN — SODIUM CHLORIDE 1000 ML: 0.9 INJECTION, SOLUTION INTRAVENOUS at 01:12

## 2022-12-24 RX ADMIN — NOREPINEPHRINE BITARTRATE 0.06 MCG/KG/MIN: 4 INJECTION, SOLUTION INTRAVENOUS at 06:12

## 2022-12-24 RX ADMIN — BUPROPION HYDROCHLORIDE 150 MG: 150 TABLET, EXTENDED RELEASE ORAL at 09:12

## 2022-12-24 RX ADMIN — NOREPINEPHRINE BITARTRATE 0.02 MCG/KG/MIN: 4 INJECTION, SOLUTION INTRAVENOUS at 11:12

## 2022-12-24 RX ADMIN — PIPERACILLIN SODIUM AND TAZOBACTAM SODIUM 3.38 G: 3; .375 INJECTION, POWDER, LYOPHILIZED, FOR SOLUTION INTRAVENOUS at 09:12

## 2022-12-24 RX ADMIN — SODIUM CHLORIDE 2000 ML: 0.9 INJECTION, SOLUTION INTRAVENOUS at 07:12

## 2022-12-24 RX ADMIN — FLUOXETINE 40 MG: 20 CAPSULE ORAL at 09:12

## 2022-12-24 RX ADMIN — SUCRALFATE 1 G: 1 SUSPENSION ORAL at 09:12

## 2022-12-24 RX ADMIN — HYDROCODONE BITARTRATE AND ACETAMINOPHEN 1 TABLET: 10; 325 TABLET ORAL at 10:12

## 2022-12-24 RX ADMIN — HYDROCODONE BITARTRATE AND ACETAMINOPHEN 1 TABLET: 10; 325 TABLET ORAL at 01:12

## 2022-12-24 RX ADMIN — SODIUM CHLORIDE 500 ML: 0.9 INJECTION, SOLUTION INTRAVENOUS at 04:12

## 2022-12-24 RX ADMIN — BUDESONIDE 1 MG: 0.5 INHALANT RESPIRATORY (INHALATION) at 07:12

## 2022-12-24 RX ADMIN — POTASSIUM CHLORIDE 40 MEQ: 1500 TABLET, EXTENDED RELEASE ORAL at 05:12

## 2022-12-24 RX ADMIN — MAGNESIUM SULFATE HEPTAHYDRATE 2 G: 40 INJECTION, SOLUTION INTRAVENOUS at 05:12

## 2022-12-24 RX ADMIN — PIPERACILLIN SODIUM AND TAZOBACTAM SODIUM 3.38 G: 3; .375 INJECTION, POWDER, LYOPHILIZED, FOR SOLUTION INTRAVENOUS at 04:12

## 2022-12-24 RX ADMIN — ATORVASTATIN CALCIUM 10 MG: 10 TABLET, FILM COATED ORAL at 09:12

## 2022-12-24 RX ADMIN — PIPERACILLIN SODIUM AND TAZOBACTAM SODIUM 3.38 G: 3; .375 INJECTION, POWDER, LYOPHILIZED, FOR SOLUTION INTRAVENOUS at 12:12

## 2022-12-24 NOTE — NURSING
After Visit Summary                                                                                                                Waldemar RIVERA   MRN: [id number]    Department:  [facility], Emergency Dept   Date of Visit:  2/2/2017            [facility], Emergency Dept    [address]    [place] [address]    Phone:  [phone]      You were seen by     Leticia Haider D.O.      Your Diagnosis Was     RSV bronchiolitis     J21.0       Follow-up Information     1. Follow up with Ronnie Steward M.D. In 1 day.    Specialty:  Family Medicine    Contact information    [address]  [address]  [place] [id number] [phone]        Medication Information     Review all of your home medications and newly ordered medications with your primary doctor and/or pharmacist as soon as possible. Follow medication instructions as directed by your doctor and/or pharmacist.     Please keep your complete medication list with you and share with your physician. Update the information when medications are discontinued, doses are changed, or new medications (including over-the-counter products) are added; and carry medication information at all times in the event of emergency situations.               Medication List      Notice     You have not been prescribed any medications.            Procedures and tests performed during your visit     DX-CHEST-PORTABLE (1 VIEW)    INFLUENZA RAPID    RESPIRATORY SYNCYTIAL VIRUS (RSV)        Discharge Instructions       Follow-up with primary care tomorrow for reevaluation, and to schedule appointment to catch up on vaccines.    Tylenol every 4-6 hours as needed for fever.  Frequent nasal suctioning is encouraged, especially before meals and at bedtimes.  A cool mist humidifier as beneficial as well.  Resume diet as tolerated. Pedialyte may be offered as well.    Return to the emergency department for intractable fever, difficulty breathing,  Hospitalist notified regarding patient's response to bolus. Patient still hypotensive, patient also bladder scanned- only 70 mL present in bladder      Update: awaiting response from hospitalist   wheezing, retractions, altered mental status, decreased urine output or other new concerns.    Bronchiolitis, Pediatric  Bronchiolitis is a swelling (inflammation) of the airways in the lungs called bronchioles. It causes breathing problems. These problems are usually not serious, but they can sometimes be life threatening.   Bronchiolitis usually occurs during the first 3 years of life. It is most common in the first 6 months of life.  HOME CARE  · Only give your child medicines as told by the doctor.  · Try to keep your child's nose clear by using saline nose drops. You can buy these at any pharmacy.  · Use a bulb syringe to help clear your child's nose.  · Use a cool mist vaporizer in your child's bedroom at night.  · Have your child drink enough fluid to keep his or her pee (urine) clear or light yellow.  · Keep your child at home and out of school or  until your child is better.  · To keep the sickness from spreading:  ¨ Keep your child away from others.  ¨ Everyone in your home should wash their hands often.  ¨ Clean surfaces and doorknobs often.  ¨ Show your child how to cover his or her mouth or nose when coughing or sneezing.  ¨ Do not allow smoking at home or near your child. Smoke makes breathing problems worse.  · Watch your child's condition carefully. It can change quickly. Do not wait to get help for any problems.  GET HELP IF:  · Your child is not getting better after 3 to 4 days.  · Your child has new problems.  GET HELP RIGHT AWAY IF:   · Your child is having more trouble breathing.  · Your child seems to be breathing faster than normal.  · Your child makes short, low noises when breathing.  · You can see your child's ribs when he or she breathes (retractions) more than before.  · Your infant's nostrils move in and out when he or she breathes (flare).  · It gets harder for your child to eat.  · Your child pees less than before.  · Your child's mouth seems dry.  · Your child looks  blue.  · Your child needs help to breathe regularly.  · Your child begins to get better but suddenly has more problems.  · Your child's breathing is not regular.  · You notice any pauses in your child's breathing.  · Your child who is younger than 3 months has a fever.  MAKE SURE YOU:  · Understand these instructions.  · Will watch your child's condition.  · Will get help right away if your child is not doing well or gets worse.     This information is not intended to replace advice given to you by your health care provider. Make sure you discuss any questions you have with your health care provider.     Document Released: 12/18/2006 Document Revised: 2016 Document Reviewed: 08/19/2014  [employer] Interactive Patient Education ©2016 [employer]  Respiratory Syncytial Virus, Pediatric  Respiratory syncytial virus (RSV) is a common childhood viral illness and one of the most frequent reasons infants are admitted to the hospital. It is often the cause of a respiratory condition called bronchiolitis (a viral infection of the small airways of the lungs). RSV infection usually occurs within the first 3 years of life but can occur at any age. Infections are most common between the months of November and April but can happen during any time of the year. Children less than 2 year of age, especially premature infants, children born with heart or lung disease, or other chronic medical problems, are most at risk for severe breathing problems from RSV infection.   CAUSES  The illness is caused by exposure to another person who is infected with respiratory syncytial virus (RSV) or to something that an infected person recently touched if they did not wash their hands. The virus is highly contagious and a person can be re-infected with RSV even if they have had the infection before. RSV can infect both children and adults.  SYMPTOMS   Wheezing or a whistling noise when breathing (stridor).  Frequent coughing.  Difficulty  breathing.  Runny nose.  Fever.  Decreased appetite or activity level.  DIAGNOSIS   In most children, the diagnosis of RSV is usually based on medical history and physical exam results and additional testing is not necessary. If needed, other tests may include:  Test of nasal secretions.  Chest X-ray if difficulty in breathing develops.  Blood tests to check for worsening infection and dehydration.  TREATMENT  Treatment is aimed at improving symptoms. Since RSV is a viral illness, typically no antibiotic medicine is prescribed. If your child has severe RSV infection or other health problems, he or she may need to be admitted to the hospital.  HOME CARE INSTRUCTIONS  Your child may receive a prescription for a medicine that opens up the airways (bronchodilator) if their health care provider feels that it will help to reduce symptoms.  Try to keep your child's nose clear by using saline nose drops. You can buy these drops over-the-counter at any pharmacy. Only take over-the-counter or prescription medicines for pain, fever, or discomfort as directed by your health care provider.  A bulb syringe may be used to suction out nasal secretions and help clear congestion.  Using a cool mist vaporizer in your child's bedroom at night may help loosen secretions.  Because your child is breathing harder and faster, your child is more likely to get dehydrated. Encourage your child to drink as much as possible to prevent dehydration.  Keep the infected person away from people who are not infected. RSV is very contagious.  Frequent hand washing by everyone in the home as well as cleaning surfaces and doorknobs will help reduce the spread of the virus.  Infants exposed to smokers are more likely to develop this illness. Exposure to smoke will worsen breathing problems. Smoking should not be allowed in the home.  Children with RSV should remain home and not return to school or  until symptoms have improved.  The child's  condition can change rapidly. Carefully monitor your child's condition and do not delay seeking medical care for any problems.  SEEK IMMEDIATE MEDICAL CARE IF:   Your child is having more difficulty breathing.  You notice grunting noises with your child's breathing.  Your child develops retractions (the ribs appear to stick out) when breathing.  You notice nasal flaring (nostril moving in and out when the infant breathes).  Your child has increased difficulty with feeding or persistent vomiting after feeding.  There is a decrease in the amount of urine or your child's mouth seems dry.  Your child appears blue at any time.  Your child initially begins to improve but suddenly develops more symptoms.  Your child's breathing is not regular or you notice any pauses when breathing. This is called apnea and is most likely to occur in young infants.  Your child is younger than three months and has a fever.     This information is not intended to replace advice given to you by your health care provider. Make sure you discuss any questions you have with your health care provider.     Document Released: 03/26/2002 Document Revised: 10/08/2014 Document Reviewed: 07/17/2014  [employer] Interactive Patient Education ©2016 [employer]            Patient Information     Patient Information    Following emergency treatment: all patient requiring follow-up care must return either to a private physician or a clinic if your condition worsens before you are able to obtain further medical attention, please return to the emergency room.     Billing Information    At [facility], we work to make the billing process streamlined for our patients.  Our Representatives are here to answer any questions you may have regarding your hospital bill.  If you have insurance coverage and have supplied your insurance information to us, we will submit a claim to your insurer on your behalf.  Should you have any questions regarding your bill, we can be  reached online or by phone as follows:  Online: You are able pay your bills online or live chat with our representatives about any billing questions you may have. We are here to help Monday - Friday from 8:00am to 7:30pm and 9:00am - 12:00pm on Saturdays.  Please visit [email or web address]  for more information.   Phone:  [phone] or [phone]    Please note that your emergency physician, surgeon, pathologist, radiologist, anesthesiologist, and other specialists are not employed by [facility] and will therefore bill separately for their services.  Please contact them directly for any questions concerning their bills at the numbers below:     Emergency Physician Services:  [phone]  [facility]:  [phone]  [facility]:  [phone]  [facility]:  [phone]    1. Your final bill may vary from the amount quoted upon discharge if all procedures are not complete at that time, or if your doctor has additional procedures of which we are not aware. You will receive an additional bill if you return to the Emergency Department at [facility] for suture removal regardless of the facility of which the sutures were placed.     2. Please arrange for settlement of this account at the emergency registration.    3. All self-pay accounts are due in full at the time of treatment.  If you are unable to meet this obligation then payment is expected within 4-5 days.     4. If you have had radiology studies (CT, X-ray, Ultrasound, MRI), you have received a preliminary result during your emergency department visit. Please contact the radiology department [phone] to receive a copy of your final result. Please discuss the Final result with your primary physician or with the follow up physician provided.     Crisis Hotline:  [place] Crisis Hotline:  2-153-ZJYEVDC or 1-174.266.7796  [place] Crisis Hotline:    1-629.516.2774 or  583.296.6842         ED Discharge Follow Up Questions    1. In order to provide you with very good care, we would like to follow up with a phone call in the next few days.  May we have your permission to contact you?     YES /  NO    2. What is the best phone number to call you? (       )_____-__________    3. What is the best time to call you?      Morning  /  Afternoon  /  Evening                   Patient Signature:  ____________________________________________________________    Date:  ____________________________________________________________

## 2022-12-24 NOTE — NURSING TRANSFER
Nursing Transfer Note      12/23/2022     Reason patient is being transferred: Post cath lab    Transfer From: cath lab    Transfer via bed    Transfer with  to O2, cardiac monitoring    Transported by RNx2    Chart send with patient: Yes    Notified: spouse, daughter    Patient reassessed at: 14:40, 12/23/22 (date, time)    Upon arrival to floor: cardiac monitor applied, patient oriented to room, call bell in reach, and bed in lowest position    Pt AAOX4, calm, cooperative. Right wrist- TR band In place and inflated, bruised and small hematoma  noted before arrival to unit, pulse present, fingers warm, cap refill <3 sec, denied numbness or tingling. Will continue to monitor.

## 2022-12-24 NOTE — PROGRESS NOTES
TR band deflated, +2 pulses noted.  Notified dr. Tran regarding to pt troponin sensitivity result. - MD order not to repeat at the moment.

## 2022-12-24 NOTE — NURSING
Hospitalist notified regarding patient's BP again. Patient very hypotensive but AAOx4, no complaints at this time upon assessment and does not appear to be in distress. Orders placed

## 2022-12-24 NOTE — PROGRESS NOTES
Consulted for possible cystic duct obstruction based off of distended gallbladder in imaging. Per report, no wall edema or pericholecystic fluid and Zacariass sign was negative. Low initial suspicion for cholecystitis but HIDA has been ordered to further evaluate whether the gallbladder will fill. Will follow up imaging. Full consult note to follow once patient is seen and examined.

## 2022-12-24 NOTE — ANESTHESIA PROCEDURE NOTES
Arterial line replacement    Diagnosis: Hypotension    Patient location during procedure: ICU  Procedure start time: 12/24/2022 8:15 AM  Timeout: 12/24/2022 8:15 AM  Procedure end time: 12/24/2022 8:20 AM    Staffing  Authorizing Provider: Billy Westfall MD  Performing Provider: Billy Westfall MD    Anesthesiologist was present at the time of the procedure.    Preanesthetic Checklist  Completed: patient identified, risks and benefits discussed, timeout performed and anesthesia consent givenArterial line replacement  Skin Prep: chlorhexidine gluconate  Local Infiltration: none  Orientation: left  Location: radial    Catheter Size: 20 G Insertion Attempts: 1  Assessment  Dressing: tegaderm  Patient: Tolerated well  Additional Notes  Called to replace arterial line with extremely dampened waveform despite easily flushing and drawing back blood.  Informed consent obtained by me from patient to replace arterial catheter over guidewire.  Original arterial catheter and surrounding area thoroughly prepped with ChloraPrep.  Using sterile technique, catheter exchanged easily over guidewire with a new 3 in 20 gauge catheter.  This catheter was not sutured since patient is very tender in her hand and wrist and did not want to be stuck again.  Good waveform.

## 2022-12-24 NOTE — CARE UPDATE
12/24/22 0712   Patient Assessment/Suction   Level of Consciousness (AVPU) alert   Respiratory Effort Unlabored   Expansion/Accessory Muscles/Retractions expansion symmetric   All Lung Fields Breath Sounds Anterior:;Lateral:;diminished   Rhythm/Pattern, Respiratory depth regular   PRE-TX-O2   Device (Oxygen Therapy) nasal cannula   $ Is the patient on Low Flow Oxygen? Yes   Flow (L/min) 2   SpO2 (!) 92 %   Pulse Oximetry Type Continuous   $ Pulse Oximetry - Multiple Charge Pulse Oximetry - Multiple   Pulse 69   Resp 18   Aerosol Therapy   $ Aerosol Therapy Charges Aerosol Treatment   Daily Review of Necessity (SVN) completed   Respiratory Treatment Status (SVN) given   Treatment Route (SVN) mask   Post Treatment Assessment (SVN) increased aeration   Signs of Intolerance (SVN) none   Breath Sounds Post-Respiratory Treatment   Throughout All Fields Post-Treatment All Fields   Throughout All Fields Post-Treatment Anterior:;aeration increased   Post-treatment Heart Rate (beats/min) 76   Post-treatment Resp Rate (breaths/min) 20   Education   $ Education Bronchodilator;30 min   Respiratory Evaluation   $ Care Plan Tech Time 15 min   $ Eval/Re-eval Charges Evaluation

## 2022-12-24 NOTE — PROGRESS NOTES
"[facility]  Progress Note    Patient Name: Iris Mueller  MRN: [id number]  Admission Date: 12/23/2022  1:09 PM  Attending Physician: Anitha Nelson MD  Face-to-Face encounter date: 12/24/2022    Assessment & Plan:   Iris Mueller is a 67 y.o. female with:    Active Hospital Problems    Diagnosis  POA    *Diverticulitis [K57.92]  Yes    Concern for NSTEMI (non-ST elevated myocardial infarction) [I21.4]  Yes    Chronic obstructive pulmonary disease [J44.9]  Yes    Hypertension [I10]  Yes    Essential hypertension [I10]  Yes      Resolved Hospital Problems   No resolved problems to display.     Shock, unknown etiology  - sepsis bolus  - start pressors prn  - infection workup in progress, r/o sepsis  - EKG, troponin, BNP  - very poor UOP + JEFF, suspect volume down  - continue IVFs  - continue zosyn  - echo reviewed    Concern for NSTEMI  - angiogram done on presentation  - trending troponin  - monitor in ICU  - cardiology following, thank you    RUQ abdominal pain  Diverticulitis  ?cystic duct obstruction  - empiric zosyn  - blood cultures in progress  - trending CBC, CMP  - IVFs  - protonix, carafate  - RUQ US reviewed  - GI consulted, thank you     Chronic conditions as noted above/below; home medications reviewed personally by me and restarted as appropriate  Electrolyte derangement:  Trending BMP; Mg; replacement prn  DVT ppx: SCDs, start lovenox when appropriate  FULL CODE    Other chronic conditions including but not limited to those noted above/below:  All home meds reviewed personally by me, and adjusted as appropriate.  Electrolyte derangement:  Trending BMP, Mg; Replacement prn  DVT ppx:  lovenox  Dispo:  Home when medically stable.  Will need follow-up with their PCP.    FULL CODE    Subjective:      Interval History:  Pt reports she feels "well."  She is in shock, however.  BP responding to IVFs so far.  Decreased UOP.  No CP.  No SOB while on " "supplemental oxygen.  Initially denies abdominal pain (but it is present on exam.)  No subjective fever.  No chills.    Review of Systems   All systems reviewed and are negative except as noted per above.    Objective:     Physical Exam  BP (!) 75/52 (BP Location: Left arm, Patient Position: Lying)   Pulse 69   Temp 98.1 °F (36.7 °C)   Resp 18   Ht 5' 2" (1.575 m)   Wt 118 kg (260 lb 2.3 oz)   SpO2 (!) 92%   BMI 47.58 kg/m²     Gen: alert, responsive, on NC  HEENT:  Eyes - no pallor  External ears with no lesions  Nares patent  Mouth - lips chapped  CV: RRR  Lungs: CTA B/L  Abd: +BS, soft, +RUQ TTP, ND  Ext: no atrophy or edema  Skin: warm, dry  Neuro: grossly intact  Psych: pleasant    Recent Labs   Lab 12/24/22 0414   WBC 8.98   HGB 12.0   HCT 36.2*        Recent Labs   Lab 12/24/22 0414   CALCIUM 8.3*   ALBUMIN 3.1*   PROT 5.5*   *   K 3.0*   CO2 25   CL 97   BUN 20   CREATININE 1.7*   ALKPHOS 63   ALT 12   AST 11   BILITOT 1.3*     No results found for: POCTGLUCOSE   Microbiology Results (last 7 days)       Procedure Component Value Units Date/Time    Blood culture [[id number]] Collected: 12/23/22 1807    Order Status: Completed Specimen: Blood from Peripheral, Right Hand Updated: 12/24/22 0117     Blood Culture, Routine No Growth to date    Narrative:      Collection has been rescheduled by RE1 at 12/23/2022 15:42 Reason: Pt   getting ultrasound  Collection has been rescheduled by RE1 at 12/23/2022 15:42 Reason: Pt   getting ultrasound    Blood culture [[id number]] Collected: 12/23/22 1807    Order Status: Completed Specimen: Blood from Antecubital, Left Arm Updated: 12/24/22 0117     Blood Culture, Routine No Growth to date    Narrative:      Collection has been rescheduled by RE1 at 12/23/2022 15:42 Reason: Pt   getting ultrasound  Collection has been rescheduled by RE1 at 12/23/2022 15:42 Reason: Pt   getting ultrasound               Anitha Nelson MD  [facility] Hospitalist      "

## 2022-12-25 LAB
ALBUMIN SERPL BCP-MCNC: 3.4 G/DL (ref 3.5–5.2)
ALP SERPL-CCNC: 62 U/L (ref 55–135)
ALT SERPL W/O P-5'-P-CCNC: 10 U/L (ref 10–44)
ANION GAP SERPL CALC-SCNC: 5 MMOL/L (ref 8–16)
AST SERPL-CCNC: 10 U/L (ref 10–40)
BACTERIA UR CULT: NORMAL
BACTERIA UR CULT: NORMAL
BASOPHILS # BLD AUTO: 0.04 K/UL (ref 0–0.2)
BASOPHILS NFR BLD: 0.4 % (ref 0–1.9)
BILIRUB SERPL-MCNC: 0.9 MG/DL (ref 0.1–1)
BNP SERPL-MCNC: 631 PG/ML (ref 0–99)
BUN SERPL-MCNC: 17 MG/DL (ref 8–23)
CALCIUM SERPL-MCNC: 8.4 MG/DL (ref 8.7–10.5)
CHLORIDE SERPL-SCNC: 102 MMOL/L (ref 95–110)
CO2 SERPL-SCNC: 23 MMOL/L (ref 23–29)
CREAT SERPL-MCNC: 1.4 MG/DL (ref 0.5–1.4)
CRP SERPL-MCNC: 2.52 MG/DL
DIFFERENTIAL METHOD: ABNORMAL
EOSINOPHIL # BLD AUTO: 0.2 K/UL (ref 0–0.5)
EOSINOPHIL NFR BLD: 2.1 % (ref 0–8)
ERYTHROCYTE [DISTWIDTH] IN BLOOD BY AUTOMATED COUNT: 14.7 % (ref 11.5–14.5)
EST. GFR  (NO RACE VARIABLE): 40.7 ML/MIN/1.73 M^2
GLUCOSE SERPL-MCNC: 146 MG/DL (ref 70–110)
HCT VFR BLD AUTO: 37.9 % (ref 37–48.5)
HGB BLD-MCNC: 12.1 G/DL (ref 12–16)
IMM GRANULOCYTES # BLD AUTO: 0.05 K/UL (ref 0–0.04)
IMM GRANULOCYTES NFR BLD AUTO: 0.5 % (ref 0–0.5)
LYMPHOCYTES # BLD AUTO: 1.4 K/UL (ref 1–4.8)
LYMPHOCYTES NFR BLD: 13.2 % (ref 18–48)
MAGNESIUM SERPL-MCNC: 2.1 MG/DL (ref 1.6–2.6)
MCH RBC QN AUTO: 31.3 PG (ref 27–31)
MCHC RBC AUTO-ENTMCNC: 31.9 G/DL (ref 32–36)
MCV RBC AUTO: 98 FL (ref 82–98)
MONOCYTES # BLD AUTO: 1 K/UL (ref 0.3–1)
MONOCYTES NFR BLD: 9.7 % (ref 4–15)
MRSA SCREEN BY PCR: POSITIVE
NEUTROPHILS # BLD AUTO: 7.8 K/UL (ref 1.8–7.7)
NEUTROPHILS NFR BLD: 74.1 % (ref 38–73)
NRBC BLD-RTO: 0 /100 WBC
PLATELET # BLD AUTO: 296 K/UL (ref 150–450)
PMV BLD AUTO: 9 FL (ref 9.2–12.9)
POTASSIUM SERPL-SCNC: 3.9 MMOL/L (ref 3.5–5.1)
PROCALCITONIN SERPL IA-MCNC: <0.05 NG/ML (ref 0–0.5)
PROT SERPL-MCNC: 6.2 G/DL (ref 6–8.4)
RBC # BLD AUTO: 3.86 M/UL (ref 4–5.4)
SODIUM SERPL-SCNC: 130 MMOL/L (ref 136–145)
TROPONIN I SERPL HS-MCNC: 120.8 PG/ML (ref 0–14.9)
WBC # BLD AUTO: 10.48 K/UL (ref 3.9–12.7)

## 2022-12-25 PROCEDURE — C9113 INJ PANTOPRAZOLE SODIUM, VIA: HCPCS | Performed by: FAMILY MEDICINE

## 2022-12-25 PROCEDURE — 94645 CONT INHLJ TX EACH ADDL HOUR: CPT

## 2022-12-25 PROCEDURE — 63600175 PHARM REV CODE 636 W HCPCS: Performed by: FAMILY MEDICINE

## 2022-12-25 PROCEDURE — 84484 ASSAY OF TROPONIN QUANT: CPT | Performed by: FAMILY MEDICINE

## 2022-12-25 PROCEDURE — 85025 COMPLETE CBC W/AUTO DIFF WBC: CPT | Performed by: FAMILY MEDICINE

## 2022-12-25 PROCEDURE — 99222 PR INITIAL HOSPITAL CARE,LEVL II: ICD-10-PCS | Mod: GT,,, | Performed by: SURGERY

## 2022-12-25 PROCEDURE — 87641 MR-STAPH DNA AMP PROBE: CPT | Performed by: FAMILY MEDICINE

## 2022-12-25 PROCEDURE — 80053 COMPREHEN METABOLIC PANEL: CPT | Performed by: FAMILY MEDICINE

## 2022-12-25 PROCEDURE — 99900035 HC TECH TIME PER 15 MIN (STAT)

## 2022-12-25 PROCEDURE — 25000003 PHARM REV CODE 250: Performed by: FAMILY MEDICINE

## 2022-12-25 PROCEDURE — 83735 ASSAY OF MAGNESIUM: CPT | Performed by: FAMILY MEDICINE

## 2022-12-25 PROCEDURE — 25000242 PHARM REV CODE 250 ALT 637 W/ HCPCS: Performed by: FAMILY MEDICINE

## 2022-12-25 PROCEDURE — 20000000 HC ICU ROOM

## 2022-12-25 PROCEDURE — 86140 C-REACTIVE PROTEIN: CPT | Performed by: FAMILY MEDICINE

## 2022-12-25 PROCEDURE — 94799 UNLISTED PULMONARY SVC/PX: CPT

## 2022-12-25 PROCEDURE — 99900031 HC PATIENT EDUCATION (STAT)

## 2022-12-25 PROCEDURE — 94761 N-INVAS EAR/PLS OXIMETRY MLT: CPT

## 2022-12-25 PROCEDURE — 83880 ASSAY OF NATRIURETIC PEPTIDE: CPT | Performed by: FAMILY MEDICINE

## 2022-12-25 PROCEDURE — 99222 1ST HOSP IP/OBS MODERATE 55: CPT | Mod: GT,,, | Performed by: SURGERY

## 2022-12-25 PROCEDURE — 84145 PROCALCITONIN (PCT): CPT | Performed by: FAMILY MEDICINE

## 2022-12-25 PROCEDURE — 27000221 HC OXYGEN, UP TO 24 HOURS

## 2022-12-25 PROCEDURE — 94640 AIRWAY INHALATION TREATMENT: CPT

## 2022-12-25 RX ORDER — MUPIROCIN 20 MG/G
OINTMENT TOPICAL 2 TIMES DAILY
Status: DISCONTINUED | OUTPATIENT
Start: 2022-12-25 | End: 2022-12-26 | Stop reason: HOSPADM

## 2022-12-25 RX ORDER — CHLORHEXIDINE GLUCONATE ORAL RINSE 1.2 MG/ML
15 SOLUTION DENTAL 2 TIMES DAILY
Status: DISCONTINUED | OUTPATIENT
Start: 2022-12-25 | End: 2022-12-26 | Stop reason: HOSPADM

## 2022-12-25 RX ADMIN — ARFORMOTEROL TARTRATE 15 MCG: 15 SOLUTION RESPIRATORY (INHALATION) at 07:12

## 2022-12-25 RX ADMIN — PIPERACILLIN SODIUM AND TAZOBACTAM SODIUM 3.38 G: 3; .375 INJECTION, POWDER, LYOPHILIZED, FOR SOLUTION INTRAVENOUS at 04:12

## 2022-12-25 RX ADMIN — SUCRALFATE 1 G: 1 SUSPENSION ORAL at 08:12

## 2022-12-25 RX ADMIN — ATORVASTATIN CALCIUM 10 MG: 10 TABLET, FILM COATED ORAL at 08:12

## 2022-12-25 RX ADMIN — HYDROCODONE BITARTRATE AND ACETAMINOPHEN 1 TABLET: 10; 325 TABLET ORAL at 11:12

## 2022-12-25 RX ADMIN — PANTOPRAZOLE SODIUM 40 MG: 40 INJECTION, POWDER, FOR SOLUTION INTRAVENOUS at 08:12

## 2022-12-25 RX ADMIN — HYDROCODONE BITARTRATE AND ACETAMINOPHEN 1 TABLET: 10; 325 TABLET ORAL at 08:12

## 2022-12-25 RX ADMIN — PIPERACILLIN SODIUM AND TAZOBACTAM SODIUM 3.38 G: 3; .375 INJECTION, POWDER, LYOPHILIZED, FOR SOLUTION INTRAVENOUS at 12:12

## 2022-12-25 RX ADMIN — NOREPINEPHRINE BITARTRATE 0.15 MCG/KG/MIN: 4 INJECTION, SOLUTION INTRAVENOUS at 10:12

## 2022-12-25 RX ADMIN — SUCRALFATE 1 G: 1 SUSPENSION ORAL at 11:12

## 2022-12-25 RX ADMIN — MUPIROCIN 1 G: 20 OINTMENT TOPICAL at 08:12

## 2022-12-25 RX ADMIN — BUPROPION HYDROCHLORIDE 150 MG: 150 TABLET, EXTENDED RELEASE ORAL at 08:12

## 2022-12-25 RX ADMIN — NOREPINEPHRINE BITARTRATE 0.12 MCG/KG/MIN: 4 INJECTION, SOLUTION INTRAVENOUS at 05:12

## 2022-12-25 RX ADMIN — SUCRALFATE 1 G: 1 SUSPENSION ORAL at 04:12

## 2022-12-25 RX ADMIN — SODIUM CHLORIDE: 0.9 INJECTION, SOLUTION INTRAVENOUS at 05:12

## 2022-12-25 RX ADMIN — FLUOXETINE 40 MG: 20 CAPSULE ORAL at 08:12

## 2022-12-25 RX ADMIN — BUDESONIDE 0.5 MG: 0.5 INHALANT RESPIRATORY (INHALATION) at 07:12

## 2022-12-25 RX ADMIN — CHLORHEXIDINE GLUCONATE 15 ML: 1.2 RINSE ORAL at 08:12

## 2022-12-25 RX ADMIN — PIPERACILLIN SODIUM AND TAZOBACTAM SODIUM 3.38 G: 3; .375 INJECTION, POWDER, LYOPHILIZED, FOR SOLUTION INTRAVENOUS at 09:12

## 2022-12-25 RX ADMIN — TRAZODONE HYDROCHLORIDE 100 MG: 50 TABLET ORAL at 01:12

## 2022-12-25 RX ADMIN — BUDESONIDE 1 MG: 0.5 INHALANT RESPIRATORY (INHALATION) at 07:12

## 2022-12-25 RX ADMIN — SUCRALFATE 1 G: 1 SUSPENSION ORAL at 05:12

## 2022-12-25 RX ADMIN — SODIUM CHLORIDE: 0.9 INJECTION, SOLUTION INTRAVENOUS at 08:12

## 2022-12-25 RX ADMIN — PANTOPRAZOLE SODIUM 40 MG: 40 INJECTION, POWDER, FOR SOLUTION INTRAVENOUS at 09:12

## 2022-12-25 RX ADMIN — NOREPINEPHRINE BITARTRATE 0.14 MCG/KG/MIN: 4 INJECTION, SOLUTION INTRAVENOUS at 01:12

## 2022-12-25 RX ADMIN — POTASSIUM CHLORIDE 20 MEQ: 1500 TABLET, EXTENDED RELEASE ORAL at 08:12

## 2022-12-25 NOTE — PLAN OF CARE
Problem: Adult Inpatient Plan of Care  Goal: Plan of Care Review  Outcome: Ongoing, Progressing  Goal: Patient-Specific Goal (Individualized)  Outcome: Ongoing, Progressing  Goal: Absence of Hospital-Acquired Illness or Injury  Outcome: Ongoing, Progressing  Goal: Optimal Comfort and Wellbeing  Outcome: Ongoing, Progressing  Goal: Readiness for Transition of Care  Outcome: Ongoing, Progressing     Problem: Bariatric Environmental Safety  Goal: Safety Maintained with Care  Outcome: Ongoing, Progressing     Problem: Skin Injury Risk Increased  Goal: Skin Health and Integrity  Outcome: Ongoing, Progressing     Problem: Fall Injury Risk  Goal: Absence of Fall and Fall-Related Injury  Outcome: Ongoing, Progressing     Problem: Infection  Goal: Absence of Infection Signs and Symptoms  Outcome: Ongoing, Progressing     Pt progressing throughout shift.  Arterial line changed due to previous being positional, obtained levophed due to hypotension with improvements in BP achieved.  No further bleeding from right access site, progress swelling occurred in right hand and wrist which an US was obtained to rule out DVTs.  Continues to have RUQ tenderness with touch, Dr. Nelson aware. Currently on way to obtain a HIDA scan.  Improvements with urine output with 400 total for my shift.

## 2022-12-25 NOTE — CONSULTS
GENERAL SURGERY  INPATIENT CONSULT    REASON FOR CONSULT: Cystic duct obstruction, diverticulitis    HPI: Iris Mueller is a 67 y.o. female admitted with a 3 day history of nausea, vomiting fevers and 1 day history of lower abdominal pain. Mild leukocytosis of 13 K upon presentation.  Underwent CT abdomen pelvis which showed concerns for sigmoid diverticulitis and a distended gallbladder but without pericholecystic fluid or wall thickening.  Underwent ultrasound which confirmed distended gallbladder concern for cystic duct obstruction but had a negative Zacarias sign and no pericholecystic fluid or gallbladder wall thickening. She was started on antibiotics for the diverticulitis.  Her leukocytosis resolved. She was admitted to the ICU however due to hypotensive readings on blood pressure cuff and a line despite being asymptomatic. She is currently on Levophed. She denies any abdominal pain. No nausea or vomiting.  Tolerating diet. Has numerous bruises and edema along the upper extremities from a lines and venous sticks.    I have reviewed the patient's chart including prior progress notes, procedures and testing.     ROS:   Review of Systems   All other systems reviewed and are negative.    PROBLEM LIST:  Patient Active Problem List   Diagnosis    Mild asthma without complication    Essential hypertension    Obesity, Class III, BMI 40-49.9 (morbid obesity)    Fibromyalgia    Hyperkalemia    Non-seasonal allergic rhinitis    Acute blood loss anemia    Normocytic anemia    Benign hypertension with CKD (chronic kidney disease) stage III    Hyponatremia    Hyperglycemia    Hypertension    Asthma    Anemia    Morbid obesity    Artificial knee joint present, right    Dyslipidemia    Chronic obstructive pulmonary disease    Age-related osteoporosis without current pathological fracture    Mild episode of recurrent major depressive disorder    Primary insomnia    Elevated troponin    Concern for NSTEMI (non-ST  elevated myocardial infarction)    Diverticulitis    Gallbladder disorder         HISTORY  Past Medical History:   Diagnosis Date    Arthritis     Asthma     COPD (chronic obstructive pulmonary disease)     Encounter for blood transfusion     Hypertension     Wound infection 2019    Right knee       Past Surgical History:   Procedure Laterality Date     SECTION      JOINT REPLACEMENT      Knee    KNEE ARTHROPLASTY Right 2019    Procedure: ARTHROPLASTY, KNEE;  Surgeon: Delio Chung MD;  Location: [facility];  Service: Orthopedics;  Laterality: Right;  anesthesia:  general and block    REPLACEMENT OF WOUND VACUUM-ASSISTED CLOSURE DEVICE Right 2019    Procedure: REPLACEMENT, WOUND VAC;  Surgeon: Delio Chung MD;  Location: [facility] OR;  Service: Orthopedics;  Laterality: Right;    TONSILLECTOMY         Social History     Tobacco Use    Smoking status: Light Smoker     Packs/day: 0.50     Years: 30.00     Pack years: 15.00     Types: Cigarettes    Smokeless tobacco: Never   Substance Use Topics    Alcohol use: Yes     Comment: RARELY    Drug use: Never       Family History   Problem Relation Age of Onset    Alzheimer's disease Mother          MEDS:  Current Facility-Administered Medications on File Prior to Encounter   Medication Dose Route Frequency Provider Last Rate Last Admin    lidocaine (PF) 10 mg/ml (1%) injection 5 mg  0.5 mL Intradermal Once Azeem Anderson MD         Current Outpatient Medications on File Prior to Encounter   Medication Sig Dispense Refill    albuterol (PROVENTIL/VENTOLIN HFA) 90 mcg/actuation inhaler Inhale 2 puffs into the lungs every 6 (six) hours as needed for Wheezing. Rescue 18 g 2    ANORO ELLIPTA 62.5-25 mcg/actuation DsDv Inhale 1 puff into the lungs once daily. 60 each 5    atorvastatin (LIPITOR) 10 MG tablet Take 1 tablet (10 mg total) by mouth every evening. 90 tablet 1    betamethasone dipropionate 0.05 % cream Apply topically 2 (two) times daily.  (Patient not taking: Reported on 12/22/2022) 45 g 2    buPROPion (WELLBUTRIN SR) 150 MG TBSR 12 hr tablet Take 1 tablet (150 mg total) by mouth 2 (two) times daily. 180 tablet 1    cetirizine HCl/pseudoephedrine (ZYRTEC-D ORAL) Take by mouth.      ferrous sulfate (FEOSOL) 325 mg (65 mg iron) Tab tablet Take 1 tablet (325 mg total) by mouth once daily.  0    FLUoxetine 40 MG capsule Take 1 capsule (40 mg total) by mouth once daily. 90 capsule 1    fluticasone propionate (FLONASE) 50 mcg/actuation nasal spray 1 spray (50 mcg total) by Each Nostril route once daily. (Patient not taking: Reported on 12/22/2022) 16 g 2    gabapentin (NEURONTIN) 300 MG capsule Take 1 capsule (300 mg total) by mouth 3 (three) times daily. 270 capsule 1    HYDROcodone-acetaminophen (NORCO)  mg per tablet Take 1 tablet by mouth every 8 (eight) hours as needed for Pain. 90 tablet 0    lisinopriL 10 MG tablet Take 1 tablet (10 mg total) by mouth once daily. 90 tablet 1    loratadine (CLARITIN) 10 mg tablet Take 10 mg by mouth once daily.      mupirocin (BACTROBAN) 2 % ointment Apply topically.      mupirocin (BACTROBAN) 2 % ointment Apply topically 3 (three) times daily. (Patient not taking: Reported on 12/22/2022) 30 g 0    traZODone (DESYREL) 50 MG tablet Take 2 tablets (100 mg total) by mouth nightly as needed for Insomnia. 180 tablet 1    triamcinolone acetonide 0.1% (KENALOG) 0.1 % cream Apply topically 2 (two) times daily. (Patient not taking: Reported on 12/22/2022) 45 g 2       ALLERGIES:  Review of patient's allergies indicates:  No Known Allergies      VITALS:  Temp:  [98.1 °F (36.7 °C)-98.4 °F (36.9 °C)] 98.3 °F (36.8 °C)  Pulse:  [61-77] 74  Resp:  [8-37] 34  SpO2:  [88 %-100 %] 97 %  BP: ()/(22-89) 131/65  Arterial Line BP: ()/() 73/64    I/O last 3 completed shifts:  In: 1664.2 [P.O.:950; I.V.:714.2]  Out: 1950 [Urine:1950]      PHYSICAL EXAM:  Physical Exam  Vitals reviewed.   Constitutional:        Appearance: Normal appearance. She is obese.      Comments: Sitting in chair   Eyes:      General: No scleral icterus.  Cardiovascular:      Rate and Rhythm: Normal rate and regular rhythm.      Pulses: Normal pulses.   Pulmonary:      Effort: Pulmonary effort is normal. No respiratory distress.      Breath sounds: Normal breath sounds.   Abdominal:      General: There is no distension.      Palpations: Abdomen is soft.      Tenderness: There is no abdominal tenderness.   Musculoskeletal:         General: Swelling and signs of injury present.      Cervical back: Normal range of motion and neck supple. No rigidity.   Skin:     General: Skin is warm.      Findings: Bruising present.   Neurological:      General: No focal deficit present.      Mental Status: She is alert and oriented to person, place, and time.      Motor: No weakness.   Psychiatric:         Mood and Affect: Mood normal.         Behavior: Behavior normal.         Thought Content: Thought content normal.         Judgment: Judgment normal.         LABS:  Lab Results   Component Value Date    WBC 10.48 12/25/2022    RBC 3.86 (L) 12/25/2022    HGB 12.1 12/25/2022    HCT 37.9 12/25/2022     12/25/2022     Lab Results   Component Value Date     (H) 12/25/2022     (L) 12/25/2022    K 3.9 12/25/2022     12/25/2022    CO2 23 12/25/2022    BUN 17 12/25/2022    CREATININE 1.4 12/25/2022    CALCIUM 8.4 (L) 12/25/2022     Lab Results   Component Value Date    ALT 10 12/25/2022    AST 10 12/25/2022    ALKPHOS 62 12/25/2022    BILITOT 0.9 12/25/2022     Lab Results   Component Value Date    MG 2.1 12/25/2022    PHOS 4.1 09/05/2019       STUDIES:  Images and reports were personally reviewed.      CT abdomen pelvis  CT ABDOMEN PELVIS WITH CONTRAST     CLINICAL HISTORY:  Nausea/vomiting;     TECHNIQUE:  Low dose axial images, sagittal and coronal reformations were obtained from the lung bases to the pubic symphysis following the IV  administration of 100 mL of Omnipaque 350 .  Oral contrast was not administered.     COMPARISON:  CT chest 09/22/2020     FINDINGS:  Abdomen:     - Lower thorax:Heart is normal in size.  No pericardial effusion.  Calcification of the coronary arteries.     - Lung bases: Scattered bandlike densities at both lung bases suggestive of subsegmental atelectasis or scarring.  No pleural effusion.     - Liver: Liver is enlarged and measures 22 cm.  Hepatic parenchyma is diffusely decreased in attenuation most compatible with fatty infiltration.  Scattered subcentimeter calcifications along superior aspect of the left hepatic lobe.  No focal suspicious hepatic mass.     - Gallbladder: Gallbladder is distended.  No calcified gallstones. No pericholecystic inflammatory change.     - Bile Ducts: No evidence of intra or extra hepatic biliary ductal dilation.     - Pancreas: No mass or peripancreatic fat stranding.     - Stomach: Small hiatal hernia.  No acute abnormality.     - Spleen: Normal in size and attenuation.  No focal lesion.  Small accessory splenule noted.     - Adrenals: Stable-appearing 1.8 cm hypodense left adrenal nodule most compatible with an adenoma, unchanged in size since 2020.  Right adrenal gland appears within normal limits.     - Kidneys: Kidneys are normal in size and enhance appropriately. No enhancing mass or hydronephrosis. Exophytic round 1.8 cm hypodensity arising from the midpole of the left kidney, most compatible with a cyst.  Additional subcentimeter bilateral renal cortical hypodensities, too small to characterize, probably cysts.     - Bladder: Incompletely distended, limiting its evaluation.     - Reproductive: No significant abnormality.     - Bowel/Mesentery: Small incidental duodenal diverticulum.  Numerous scattered colonic diverticula.  Mild circumferential wall thickening of the sigmoid colon, nonspecific and may be secondary to incomplete distension.  No obvious pericolonic  inflammatory fat stranding, fluid or pneumoperitoneum.    No evidence of appendicitis.     - Lymph nodes: No pathologically enlarged lymph nodes.     - Vascular: Prominent calcific atherosclerosis of the abdominal aorta and branch vessels.  No abdominal aortic aneurysm.     - Abdominal Wall/Soft Tissues: Small fat-containing umbilical hernia.     - Bones: Age-appropriate degenerative changes.  No acute osseous abnormality and no suspicious lytic or blastic lesion.     Impression:     1. Diverticulosis coli with mild circumferential wall thickening of the sigmoid colon without obvious pericolonic inflammatory fat stranding, fluid or, pneumoperitoneum, nonspecific and may be secondary to incomplete distension versus early/developing or resolving diverticulitis. Otherwise, no other acute abnormality identified within the abdomen or pelvis.  2. Hepatomegaly and hepatic steatosis.  3. Stable left adrenal nodule compatible with an adenoma.  4. Atherosclerosis.  5. Additional findings, as above.       Abdominal ultrasound  FINDINGS: As also shown on the CT, the gallbladder is pathologically distended. It measures up to 16 cm in length. This is suspicious for cystic duct obstruction. However, the sonographer indicates the patient was not tender to palpation in the region of the gallbladder the gallbladder shows no wall edema or pericholecystic fluid. No gallstones are demonstrated on these images. The common bile duct measures 4 mm which is within normal limits. The visualized portions of the liver parenchyma, as presented demonstrate normal echogenicity. The right kidney is normal in size and shape and shows no hydronephrosis. Doppler ultrasound demonstrates normal hepatopedal blood flow in the main portal vein. The head and most of the body of the pancreas were visualized and are unremarkable.     IMPRESSION:   Pathologically distended gallbladder suspicious for cystic duct obstruction. Otherwise unremarkable ultrasound  of the right upper quadrant. There is no definite CT evidence of acute cholecystitis and no gallstones are identified. A hepatobiliary scan may be helpful for further evaluation.    HIDA  FINDINGS:  Hepatobiliary imaging was performed following injection of 8.8 millicuries of 99m technetium mebrofenin. Serial images of the right upper quadrant were then performed.  Good uptake is seen by the liver.  Excretion into the biliary tree is seen within 15 minutes.  Visualization of the gallbladder is seen by 20-25 minutes.  Visualization of the small bowel is seen by 40-45 minutes.     IMPRESSION:     Normal gallbladder visualization, without evidence to suggest acute cholecystitis      ASSESSMENT & PLAN:  67 y.o. female with distended gallbladder, sigmoid diverticulitis, hypotension  -gallbladder is distended but no evidence of cholecystitis, likely incidental finding and no intervention recommended  -diverticulitis is mild on imaging and symptoms have resolved, agree with transitioning to p.o. antibiotics  -hypotension appears to be related to false readings, recommend weaning Levophed and performing manual checks and monitoring of clinical status    Please call with any questions or concerns.    Steven Andre MD  General Surgery  [phone]

## 2022-12-25 NOTE — CONSULTS
GASTROENTEROLOGY INPATIENT CONSULT NOTE  Patient Name: Iris Mueller  Patient MRN: [id number]  Patient : 1955    Admit Date: 2022  Service date: 2022    Reason for Consult: Diverticulitis    PCP: Elkin You MD    No chief complaint on file.      HPI: Patient is a 67 y.o. female with PMHx as below, presents with a 3 day history of vague lower abd pain, fever, N&V. She denies hx of similar, denies prev colonoscopy (home kit only). Pain is better, described as dull ache, no radiation, made worse by cough, not improved prev.CT showed signs sigmoid diverticulitis, distended GB.    Today she has tolerated scramled eggs    Past Medical History:  Past Medical History:   Diagnosis Date    Arthritis     Asthma     COPD (chronic obstructive pulmonary disease)     Encounter for blood transfusion     Hypertension     Wound infection 2019    Right knee        Past Surgical History:  Past Surgical History:   Procedure Laterality Date     SECTION      JOINT REPLACEMENT      Knee    KNEE ARTHROPLASTY Right 2019    Procedure: ARTHROPLASTY, KNEE;  Surgeon: Delio Chung MD;  Location: [facility] OR;  Service: Orthopedics;  Laterality: Right;  anesthesia:  general and block    REPLACEMENT OF WOUND VACUUM-ASSISTED CLOSURE DEVICE Right 2019    Procedure: REPLACEMENT, WOUND VAC;  Surgeon: Delio Chung MD;  Location: [facility] OR;  Service: Orthopedics;  Laterality: Right;    TONSILLECTOMY          Home Medications:  Medications Prior to Admission   Medication Sig Dispense Refill Last Dose    albuterol (PROVENTIL/VENTOLIN HFA) 90 mcg/actuation inhaler Inhale 2 puffs into the lungs every 6 (six) hours as needed for Wheezing. Rescue 18 g 2     ANORO ELLIPTA 62.5-25 mcg/actuation DsDv Inhale 1 puff into the lungs once daily. 60 each 5     atorvastatin (LIPITOR) 10 MG tablet Take 1 tablet (10 mg total) by mouth every evening. 90 tablet 1     betamethasone dipropionate 0.05 % cream Apply  topically 2 (two) times daily. (Patient not taking: Reported on 12/22/2022) 45 g 2     buPROPion (WELLBUTRIN SR) 150 MG TBSR 12 hr tablet Take 1 tablet (150 mg total) by mouth 2 (two) times daily. 180 tablet 1     cetirizine HCl/pseudoephedrine (ZYRTEC-D ORAL) Take by mouth.       ferrous sulfate (FEOSOL) 325 mg (65 mg iron) Tab tablet Take 1 tablet (325 mg total) by mouth once daily.  0     FLUoxetine 40 MG capsule Take 1 capsule (40 mg total) by mouth once daily. 90 capsule 1     fluticasone propionate (FLONASE) 50 mcg/actuation nasal spray 1 spray (50 mcg total) by Each Nostril route once daily. (Patient not taking: Reported on 12/22/2022) 16 g 2     gabapentin (NEURONTIN) 300 MG capsule Take 1 capsule (300 mg total) by mouth 3 (three) times daily. 270 capsule 1     HYDROcodone-acetaminophen (NORCO)  mg per tablet Take 1 tablet by mouth every 8 (eight) hours as needed for Pain. 90 tablet 0     lisinopriL 10 MG tablet Take 1 tablet (10 mg total) by mouth once daily. 90 tablet 1     loratadine (CLARITIN) 10 mg tablet Take 10 mg by mouth once daily.       mupirocin (BACTROBAN) 2 % ointment Apply topically.       mupirocin (BACTROBAN) 2 % ointment Apply topically 3 (three) times daily. (Patient not taking: Reported on 12/22/2022) 30 g 0     traZODone (DESYREL) 50 MG tablet Take 2 tablets (100 mg total) by mouth nightly as needed for Insomnia. 180 tablet 1     triamcinolone acetonide 0.1% (KENALOG) 0.1 % cream Apply topically 2 (two) times daily. (Patient not taking: Reported on 12/22/2022) 45 g 2        Inpatient Medications:   budesonide  1 mg Nebulization Q12H    And    arformoteroL  15 mcg Nebulization BID    atorvastatin  10 mg Oral QHS    buPROPion  150 mg Oral BID    FLUoxetine  40 mg Oral Daily    pantoprazole  40 mg Intravenous BID    piperacillin-tazobactam (ZOSYN) IVPB  3.375 g Intravenous Q8H    sucralfate  1 g Oral QID (AC & HS)     acetaminophen, albuterol-ipratropium, dextrose 10%, dextrose 10%,  "glucagon (human recombinant), glucose, glucose, HYDROcodone-acetaminophen, magnesium sulfate IVPB, magnesium sulfate IVPB, magnesium sulfate IVPB, magnesium sulfate IVPB, melatonin, morphine, naloxone, ondansetron, polyethylene glycol, potassium chloride in water, potassium chloride in water, potassium chloride in water, potassium chloride in water, potassium chloride, potassium chloride, potassium chloride, potassium chloride, senna-docusate 8.6-50 mg, simethicone, sodium chloride 0.9%, sodium phosphate IVPB, sodium phosphate IVPB, sodium phosphate IVPB, sodium phosphate IVPB, sodium phosphate IVPB, traZODone    Review of patient's allergies indicates:  No Known Allergies    Social History:   Social History     Occupational History    Not on file   Tobacco Use    Smoking status: Light Smoker     Packs/day: 0.50     Years: 30.00     Pack years: 15.00     Types: Cigarettes    Smokeless tobacco: Never   Substance and Sexual Activity    Alcohol use: Yes     Comment: RARELY    Drug use: Never    Sexual activity: Not Currently       Family History:   Family History   Problem Relation Age of Onset    Alzheimer's disease Mother        Review of Systems:  A 10 point review of systems was performed and was normal, except as mentioned in the HPI, including constitutional, HEENT, heme, lymph, cardiovascular, respiratory, gastrointestinal, genitourinary, neurologic, endocrine, psychiatric and musculoskeletal.      OBJECTIVE:    Physical Exam:  24 Hour Vital Sign Ranges: Temp:  [98.1 °F (36.7 °C)-98.4 °F (36.9 °C)] 98.1 °F (36.7 °C)  Pulse:  [61-77] 68  Resp:  [8-36] 8  SpO2:  [88 %-100 %] 97 %  BP: ()/(39-72) 90/51  Arterial Line BP: ()/() 76/66  Most recent vitals: BP (!) 90/51 (BP Location: Left arm, Patient Position: Lying)   Pulse 68   Temp 98.1 °F (36.7 °C) (Oral)   Resp (!) 8   Ht 5' 2" (1.575 m)   Wt 122.5 kg (270 lb 1 oz)   SpO2 97%   BMI 49.40 kg/m²    GEN: well-developedmorbidly obese " well-nourished, awake and alert, non-toxic appearing adult  HEENT: PERRL, sclera anicteric, oral mucosa pink and moist without lesion  NECK: trachea midline; Good ROM  CV: regular rate and rhythm, no murmurs or gallops  RESP: clear to auscultation bilaterally, no wheezes, rhonci or rales  ABD: soft, non-tender, non-distended, normal bowel sounds  EXT: no swelling or edema, 2+ pulses distally. L hand bluish, ecchymotic  SKIN: no rashes or jaundice  PSYCH: normal affect    Labs:   Recent Labs     12/24/22  0024 12/24/22  0414 12/25/22 0424   WBC 8.12 8.98 10.48   MCV 95 95 98    246 296     Recent Labs     12/23/22  1715 12/24/22 0414 12/25/22 0424   * 128* 130*   K 3.5 3.0* 3.9   CL 95 97 102   CO2 24 25 23   BUN 15 20 17    92 146*     No results for input(s): ALB in the last 72 hours.    Invalid input(s): ALKP, SGOT, SGPT, TBIL, DBIL, TPRO  No results for input(s): PT, INR, PTT in the last 72 hours.      Radiology Review:  NM Hepatobiliary Scan (HIDA)   Final Result      US Upper Extremity Veins Right   Final Result      US Abdomen Limited   Final Result            Diverticulitis--- currently responding to Abs--- from GI pov, can be switched to Cipro/Flagyl at time of DC    Distended GB, prob not clinically relevant, await final HIDA    She should ultimately have colonoscopy in 4-6 weeks    Thank you for this consult.    Chris Maikel  12/25/2022  10:50 AM

## 2022-12-25 NOTE — CARE UPDATE
12/25/22 0717   Patient Assessment/Suction   Level of Consciousness (AVPU) alert   All Lung Fields Breath Sounds clear   PRE-TX-O2   Device (Oxygen Therapy) nasal cannula   $ Is the patient on Low Flow Oxygen? Yes   Flow (L/min) 2   SpO2 99 %   Pulse Oximetry Type Continuous   $ Pulse Oximetry - Multiple Charge Pulse Oximetry - Multiple   Pulse 66   Resp 15   Aerosol Therapy   $ Aerosol Therapy Charges Aerosol Treatment  (pulmicort)   Daily Review of Necessity (SVN) completed   Respiratory Treatment Status (SVN) given   Treatment Route (SVN) mask   Patient Position (SVN) semi-Roblero's   Post Treatment Assessment (SVN) increased aeration   Signs of Intolerance (SVN) none   Breath Sounds Post-Respiratory Treatment   Throughout All Fields Post-Treatment aeration increased   Post-treatment Heart Rate (beats/min) 66   Post-treatment Resp Rate (breaths/min) 15   Education   $ Education Bronchodilator;15 min   Respiratory Evaluation   $ Care Plan Tech Time 15 min

## 2022-12-25 NOTE — NURSING
"          Dx: Diverticulitis    Shift Events: HScan completed, levo titrated to maintain MAP>65, otherwise NAEON VSS    Goals of Care: MAP>65    Neuro: AAO x4, Follows Commands, and Moves All Extremities    Vital Signs: BP (!) 90/51 (BP Location: Left arm, Patient Position: Lying)   Pulse 68   Temp 98.1 °F (36.7 °C) (Oral)   Resp (!) 8   Ht 5' 2" (1.575 m)   Wt 122.5 kg (270 lb 1 oz)   SpO2 97%   BMI 49.40 kg/m²     Respiratory: Nasal Cannula    Diet: NPO    Gtts: Norepinephrine and MIVF    Urine Output: Urinary Catheter 1550 cc/shift    Drains: none     Labs/Accuchecks: routine/none.    Skin: no breakdown noted, bath completed      "

## 2022-12-25 NOTE — PLAN OF CARE
Problem: Adult Inpatient Plan of Care  Goal: Plan of Care Review  Outcome: Ongoing, Progressing  Goal: Readiness for Transition of Care  Outcome: Ongoing, Progressing     Problem: Skin Injury Risk Increased  Goal: Skin Health and Integrity  Outcome: Ongoing, Progressing     Problem: Infection  Goal: Absence of Infection Signs and Symptoms  Outcome: Ongoing, Progressing     Problem: Adjustment to Illness (Heart Failure)  Goal: Optimal Coping  Outcome: Ongoing, Progressing     Problem: Cardiac Output Decreased (Heart Failure)  Goal: Optimal Cardiac Output  Outcome: Ongoing, Progressing     Problem: Fluid Imbalance (Heart Failure)  Goal: Fluid Balance  Outcome: Ongoing, Progressing   Pt gotten out of bed to bed side chair. Ambulated to restroom for BM when , niece, and mother in law came by. Remains up in chair. After talking to Dr Ruth, removed positional a-line, and simmons catheter. Both tips intact. Left FA IV removed due to clotted off and hurting. Blisters noted under dressing after removal with adhesive remover. BUE with various stages of purple bruising. Ace wrap to right hand/arm angiogram site removed due to soreness and edema. After removal, swelling decreased. Still on levophed but at a decreased rate of 0.03.

## 2022-12-25 NOTE — NURSING
Contacted Dr. Payan in regards to being unable to obtain a BP on this pt nor having any output for night shift. Instructed to get arterial line replaced and defer to day shift MD. Notified Dr. Nelson of same findings in which received orders to give NS bolus, obtain labs, and to get an EKG.  Will continue to monitor.

## 2022-12-25 NOTE — PROGRESS NOTES
"[facility] Medicine  Progress Note    Patient Name: Iris Mueller  MRN: [id number]  Admission Date: 12/23/2022  1:09 PM  Attending Physician: Anitha Nelson MD  Face-to-Face encounter date: 12/25/2022    Assessment & Plan:   Iris Mueller is a 67 y.o. female with:    Active Hospital Problems    Diagnosis  POA    *Diverticulitis [K57.92]  Yes    Gallbladder disorder [K82.9]  Yes    Concern for NSTEMI (non-ST elevated myocardial infarction) [I21.4]  Yes    Chronic obstructive pulmonary disease [J44.9]  Yes    Hypertension [I10]  Yes    Essential hypertension [I10]  Yes      Resolved Hospital Problems   No resolved problems to display.     Septic Shock  Likely due to diverticulitis  ?cystic duct obstruction  - monitor in ICU, pressors  - s/p sepsis bolus, continue gentle IVFs 2/2 CHF  - continue zosyn  - follow cultures  - HIDA unremarkable  - GI and general surgery following, thank you    Concern for cardiogenic shock  Concern for NSTEMI  HFrEF 40%  - angiogram done on presentation  - trending troponin  - monitor in ICU  - cardiology following, thank you    Chronic conditions as noted above/below; home medications reviewed personally by me and restarted as appropriate  Electrolyte derangement:  Trending BMP; Mg; replacement prn  DVT ppx: SCDs, start lovenox when appropriate  Dispo:  GI follow-up.  FULL CODE    Subjective:      Interval History:  no cp, no sob, no abdominal pain.  She continues on levophed.  She looks remarkably well for her condition.      12/25:  Pt reports she feels "well."  She is in shock, however.  BP responding to IVFs so far.  Decreased UOP.  No CP.  No SOB while on supplemental oxygen.  Initially denies abdominal pain (but it is present on exam.)  No subjective fever.  No chills.    Review of Systems   All systems reviewed and are negative except as noted per above.    Objective:     Physical Exam  BP (!) 90/51 (BP Location: Left arm, Patient " "Position: Lying)   Pulse 68   Temp 98.3 °F (36.8 °C) (Oral)   Resp (!) 22   Ht 5' 2" (1.575 m)   Wt 122.5 kg (270 lb 1 oz)   SpO2 97%   BMI 49.40 kg/m²     Gen: alert, responsive, on NC  HEENT:  Eyes - no pallor  External ears with no lesions  Nares patent  Mouth - lips chapped  CV: RRR  Lungs: CTA B/L  Abd: +BS, soft, +RUQ TTP, ND  Ext: no atrophy or edema  Skin: warm, dry  Neuro: grossly intact  Psych: pleasant    Recent Labs   Lab 12/25/22 0424   WBC 10.48   HGB 12.1   HCT 37.9          Recent Labs   Lab 12/25/22 0424   CALCIUM 8.4*   ALBUMIN 3.4*   PROT 6.2   *   K 3.9   CO2 23      BUN 17   CREATININE 1.4   ALKPHOS 62   ALT 10   AST 10   BILITOT 0.9       No results found for: POCTGLUCOSE   Microbiology Results (last 7 days)       Procedure Component Value Units Date/Time    Urine culture [[id number]] Collected: 12/24/22 1249    Order Status: Completed Specimen: Urine Updated: 12/25/22 0707     Urine Culture, Routine Multiple organisms isolated. None in predominance.  Repeat if      clinically necessary.    Narrative:      Specimen Source->Urine    Blood culture [[id number]] Collected: 12/23/22 1807    Order Status: Completed Specimen: Blood from Peripheral, Right Hand Updated: 12/24/22 1832     Blood Culture, Routine No Growth to date      No Growth to date    Narrative:      Collection has been rescheduled by RE1 at 12/23/2022 15:42 Reason: Pt   getting ultrasound  Collection has been rescheduled by RE1 at 12/23/2022 15:42 Reason: Pt   getting ultrasound    Blood culture [[id number]] Collected: 12/23/22 1807    Order Status: Completed Specimen: Blood from Antecubital, Left Arm Updated: 12/24/22 1832     Blood Culture, Routine No Growth to date      No Growth to date    Narrative:      Collection has been rescheduled by RE1 at 12/23/2022 15:42 Reason: Pt   getting ultrasound  Collection has been rescheduled by RE1 at 12/23/2022 15:42 Reason: Pt   getting ultrasound    MRSA Screen by " PCR [[id number]]     Order Status: No result Specimen: Nasopharyngeal Swab from Nasal                Anitha Nelson MD  [facility] Hospitalist

## 2022-12-26 VITALS
WEIGHT: 271.38 LBS | DIASTOLIC BLOOD PRESSURE: 59 MMHG | HEART RATE: 61 BPM | RESPIRATION RATE: 16 BRPM | HEIGHT: 62 IN | OXYGEN SATURATION: 97 % | TEMPERATURE: 98 F | BODY MASS INDEX: 49.94 KG/M2 | SYSTOLIC BLOOD PRESSURE: 120 MMHG

## 2022-12-26 PROBLEM — I21.4 NSTEMI (NON-ST ELEVATED MYOCARDIAL INFARCTION): Status: RESOLVED | Noted: 2022-12-23 | Resolved: 2022-12-26

## 2022-12-26 LAB
ALBUMIN SERPL BCP-MCNC: 2.9 G/DL (ref 3.5–5.2)
ALP SERPL-CCNC: 57 U/L (ref 55–135)
ALT SERPL W/O P-5'-P-CCNC: 9 U/L (ref 10–44)
ANION GAP SERPL CALC-SCNC: 5 MMOL/L (ref 8–16)
AST SERPL-CCNC: 8 U/L (ref 10–40)
BASOPHILS # BLD AUTO: 0.02 K/UL (ref 0–0.2)
BASOPHILS NFR BLD: 0.3 % (ref 0–1.9)
BILIRUB SERPL-MCNC: 0.6 MG/DL (ref 0.1–1)
BUN SERPL-MCNC: 17 MG/DL (ref 8–23)
CALCIUM SERPL-MCNC: 8.4 MG/DL (ref 8.7–10.5)
CHLORIDE SERPL-SCNC: 104 MMOL/L (ref 95–110)
CO2 SERPL-SCNC: 22 MMOL/L (ref 23–29)
CREAT SERPL-MCNC: 1.4 MG/DL (ref 0.5–1.4)
DIFFERENTIAL METHOD: ABNORMAL
EOSINOPHIL # BLD AUTO: 0.2 K/UL (ref 0–0.5)
EOSINOPHIL NFR BLD: 3.2 % (ref 0–8)
ERYTHROCYTE [DISTWIDTH] IN BLOOD BY AUTOMATED COUNT: 14.9 % (ref 11.5–14.5)
EST. GFR  (NO RACE VARIABLE): 40.7 ML/MIN/1.73 M^2
GLUCOSE SERPL-MCNC: 89 MG/DL (ref 70–110)
HCT VFR BLD AUTO: 30.7 % (ref 37–48.5)
HGB BLD-MCNC: 9.9 G/DL (ref 12–16)
IMM GRANULOCYTES # BLD AUTO: 0.04 K/UL (ref 0–0.04)
IMM GRANULOCYTES NFR BLD AUTO: 0.5 % (ref 0–0.5)
LYMPHOCYTES # BLD AUTO: 1.1 K/UL (ref 1–4.8)
LYMPHOCYTES NFR BLD: 14.8 % (ref 18–48)
MAGNESIUM SERPL-MCNC: 1.8 MG/DL (ref 1.6–2.6)
MAGNESIUM SERPL-MCNC: 2.2 MG/DL (ref 1.6–2.6)
MCH RBC QN AUTO: 31.6 PG (ref 27–31)
MCHC RBC AUTO-ENTMCNC: 32.2 G/DL (ref 32–36)
MCV RBC AUTO: 98 FL (ref 82–98)
MONOCYTES # BLD AUTO: 0.7 K/UL (ref 0.3–1)
MONOCYTES NFR BLD: 9.1 % (ref 4–15)
NEUTROPHILS # BLD AUTO: 5.5 K/UL (ref 1.8–7.7)
NEUTROPHILS NFR BLD: 72.1 % (ref 38–73)
NRBC BLD-RTO: 0 /100 WBC
PLATELET # BLD AUTO: 182 K/UL (ref 150–450)
PMV BLD AUTO: 9.2 FL (ref 9.2–12.9)
POTASSIUM SERPL-SCNC: 3.9 MMOL/L (ref 3.5–5.1)
PROT SERPL-MCNC: 5.5 G/DL (ref 6–8.4)
RBC # BLD AUTO: 3.13 M/UL (ref 4–5.4)
SODIUM SERPL-SCNC: 131 MMOL/L (ref 136–145)
WBC # BLD AUTO: 7.61 K/UL (ref 3.9–12.7)

## 2022-12-26 PROCEDURE — 63600175 PHARM REV CODE 636 W HCPCS: Performed by: FAMILY MEDICINE

## 2022-12-26 PROCEDURE — 83735 ASSAY OF MAGNESIUM: CPT | Performed by: FAMILY MEDICINE

## 2022-12-26 PROCEDURE — 25000003 PHARM REV CODE 250: Performed by: FAMILY MEDICINE

## 2022-12-26 PROCEDURE — 94640 AIRWAY INHALATION TREATMENT: CPT

## 2022-12-26 PROCEDURE — 94761 N-INVAS EAR/PLS OXIMETRY MLT: CPT

## 2022-12-26 PROCEDURE — 85025 COMPLETE CBC W/AUTO DIFF WBC: CPT | Performed by: FAMILY MEDICINE

## 2022-12-26 PROCEDURE — 80053 COMPREHEN METABOLIC PANEL: CPT | Performed by: FAMILY MEDICINE

## 2022-12-26 PROCEDURE — C9113 INJ PANTOPRAZOLE SODIUM, VIA: HCPCS | Performed by: FAMILY MEDICINE

## 2022-12-26 PROCEDURE — 25000242 PHARM REV CODE 250 ALT 637 W/ HCPCS: Performed by: FAMILY MEDICINE

## 2022-12-26 RX ORDER — MUPIROCIN 20 MG/G
OINTMENT TOPICAL 2 TIMES DAILY
Qty: 30 G | Refills: 0 | Status: SHIPPED | OUTPATIENT
Start: 2022-12-26 | End: 2023-01-09

## 2022-12-26 RX ORDER — PANTOPRAZOLE SODIUM 40 MG/1
40 TABLET, DELAYED RELEASE ORAL 2 TIMES DAILY
Qty: 60 TABLET | Refills: 0 | Status: SHIPPED | OUTPATIENT
Start: 2022-12-26 | End: 2023-11-15

## 2022-12-26 RX ORDER — SUCRALFATE 1 G/10ML
1 SUSPENSION ORAL
Qty: 1200 ML | Refills: 0 | Status: SHIPPED | OUTPATIENT
Start: 2022-12-26 | End: 2023-11-15

## 2022-12-26 RX ORDER — CHLORHEXIDINE GLUCONATE ORAL RINSE 1.2 MG/ML
15 SOLUTION DENTAL 2 TIMES DAILY
Qty: 420 ML | Refills: 0 | Status: SHIPPED | OUTPATIENT
Start: 2022-12-26 | End: 2023-01-09

## 2022-12-26 RX ORDER — AMOXICILLIN AND CLAVULANATE POTASSIUM 875; 125 MG/1; MG/1
1 TABLET, FILM COATED ORAL 2 TIMES DAILY
Qty: 10 TABLET | Refills: 0 | Status: SHIPPED | OUTPATIENT
Start: 2022-12-26 | End: 2022-12-31

## 2022-12-26 RX ORDER — LISINOPRIL 10 MG/1
10 TABLET ORAL DAILY
Qty: 90 TABLET | Refills: 1
Start: 2022-12-26 | End: 2023-01-19 | Stop reason: SDUPTHER

## 2022-12-26 RX ADMIN — SUCRALFATE 1 G: 1 SUSPENSION ORAL at 05:12

## 2022-12-26 RX ADMIN — PIPERACILLIN SODIUM AND TAZOBACTAM SODIUM 3.38 G: 3; .375 INJECTION, POWDER, LYOPHILIZED, FOR SOLUTION INTRAVENOUS at 08:12

## 2022-12-26 RX ADMIN — CHLORHEXIDINE GLUCONATE 15 ML: 1.2 RINSE ORAL at 08:12

## 2022-12-26 RX ADMIN — BUPROPION HYDROCHLORIDE 150 MG: 150 TABLET, EXTENDED RELEASE ORAL at 08:12

## 2022-12-26 RX ADMIN — FLUOXETINE 40 MG: 20 CAPSULE ORAL at 08:12

## 2022-12-26 RX ADMIN — TRAZODONE HYDROCHLORIDE 100 MG: 50 TABLET ORAL at 12:12

## 2022-12-26 RX ADMIN — HYDROCODONE BITARTRATE AND ACETAMINOPHEN 1 TABLET: 10; 325 TABLET ORAL at 08:12

## 2022-12-26 RX ADMIN — PIPERACILLIN SODIUM AND TAZOBACTAM SODIUM 3.38 G: 3; .375 INJECTION, POWDER, LYOPHILIZED, FOR SOLUTION INTRAVENOUS at 12:12

## 2022-12-26 RX ADMIN — BUDESONIDE 1 MG: 0.5 INHALANT RESPIRATORY (INHALATION) at 07:12

## 2022-12-26 RX ADMIN — ARFORMOTEROL TARTRATE 15 MCG: 15 SOLUTION RESPIRATORY (INHALATION) at 07:12

## 2022-12-26 RX ADMIN — MAGNESIUM SULFATE HEPTAHYDRATE 2 G: 40 INJECTION, SOLUTION INTRAVENOUS at 05:12

## 2022-12-26 RX ADMIN — MUPIROCIN 1 G: 20 OINTMENT TOPICAL at 08:12

## 2022-12-26 RX ADMIN — PANTOPRAZOLE SODIUM 40 MG: 40 INJECTION, POWDER, FOR SOLUTION INTRAVENOUS at 08:12

## 2022-12-26 RX ADMIN — POTASSIUM CHLORIDE 20 MEQ: 1500 TABLET, EXTENDED RELEASE ORAL at 08:12

## 2022-12-26 RX ADMIN — SUCRALFATE 1 G: 1 SUSPENSION ORAL at 11:12

## 2022-12-26 NOTE — PLAN OF CARE
Problem: Adult Inpatient Plan of Care  Goal: Plan of Care Review  Outcome: Ongoing, Progressing     Problem: Bariatric Environmental Safety  Goal: Safety Maintained with Care  Outcome: Ongoing, Progressing     Problem: Skin Injury Risk Increased  Goal: Skin Health and Integrity  Outcome: Ongoing, Progressing     Problem: Fall Injury Risk  Goal: Absence of Fall and Fall-Related Injury  Outcome: Ongoing, Progressing     Problem: Infection  Goal: Absence of Infection Signs and Symptoms  Outcome: Ongoing, Progressing     Problem: Adjustment to Illness (Heart Failure)  Goal: Optimal Coping  Outcome: Ongoing, Progressing     Problem: Cardiac Output Decreased (Heart Failure)  Goal: Optimal Cardiac Output  Outcome: Ongoing, Progressing     Problem: Functional Ability Impaired (Heart Failure)  Goal: Optimal Functional Ability  Outcome: Ongoing, Progressing

## 2022-12-26 NOTE — RESPIRATORY THERAPY
12/25/22 1952   Patient Assessment/Suction   Level of Consciousness (AVPU) alert   Respiratory Effort Normal;Unlabored   Expansion/Accessory Muscles/Retractions no use of accessory muscles   All Lung Fields Breath Sounds equal bilaterally;diminished;coarse   Rhythm/Pattern, Respiratory unlabored   Cough Frequency no cough   PRE-TX-O2   Device (Oxygen Therapy) room air   SpO2 100 %   Pulse Oximetry Type Continuous   $ Pulse Oximetry - Multiple Charge Pulse Oximetry - Multiple   Pulse 68   Resp 16   /62   Aerosol Therapy   $ Aerosol Therapy Charges Aerosol Treatment;Subsequent Continuous   Daily Review of Necessity (SVN) completed   Respiratory Treatment Status (SVN) given   Treatment Route (SVN) mask   Patient Position (SVN) HOB elevated   Post Treatment Assessment (SVN) breath sounds unchanged   Signs of Intolerance (SVN) none   Breath Sounds Post-Respiratory Treatment   Post-treatment Heart Rate (beats/min) 70   Post-treatment Resp Rate (breaths/min) 18   Education   $ Education Bronchodilator;15 min   Respiratory Evaluation   $ Care Plan Tech Time 15 min   $ Eval/Re-eval Charges Re-evaluation   Evaluation For Re-Eval 3 day

## 2022-12-26 NOTE — DISCHARGE SUMMARY
"Pending sale to [facility] Medicine  Discharge Summary      Patient Name: Iris Mueller  MRN: [id number]  BONIFACIO: [id number]  Patient Class: IP- Inpatient  Admission Date: 12/23/2022  Discharge Date and Time: 12/26/2022  2:59 PM  Discharging Provider: Anitha Nelson MD  Primary Care Provider: Elkin You MD    Primary Care Team: Networked reference to record PCT     HPI:     Iris Mueller is a 67 y.o. White female   With PMH of HTN, HLD, CKD, fibromyalgia,  who is transferred to [facility]  for emergent cardiac angiogram.     She presented at [facility]  Reported n/v + fever + chills x 3 days  CT noted possible diverticulitis  Troponin mildly elevated 0.7 with improving trend  Echo with motion wall abnormality  Cardiology requested emergent transfer for angiogram     Pt is currently sedated s/p angiogram  She is easily arousable  However she cannot answer questions reliably     Procedure(s) (LRB):  Angiogram, Coronary, with Left Heart Cath (N/A)  Ventriculogram, Left      Summary         The estimated blood loss was <50 mL.    The pre-procedure left ventricular end diastolic pressure was 37.    The left ventricular ejection fraction was grossly normal.    Angiographically equivocal mid left circumflex stenosis with distal PAULO 3 flow    Mild disease of LAD and right coronary artery    Hospital Course:     12/25:  Pt reports she feels "well."  She is in shock, however.  BP responding to IVFs so far.  Decreased UOP.  No CP.  No SOB while on supplemental oxygen.  Initially denies abdominal pain (but it is present on exam.)  No subjective fever.  No chills.    12/26:  no cp, no sob, no abdominal pain.  She continues on levophed.  She looks remarkably well for her condition.       12/27:  pt has been off pressors for over 18 hours.  No cp, no sob, no abdominal pain.  She is ambulating around her room.  Pt improved during inpt stay.  Pt received maximum benefit to inpt stay.  Patient was seen " "and examined on the date of discharge and determined to be suitable for discharge.    Pt admitted for:    Septic Shock, resolved  Likely due to diverticulitis, improved  ?cystic duct obstruction, incidental  - continue zosyn; transition to po augmentin at discharge  - follow cultures  - HIDA unremarkable  - GI and general surgery following, thank you     Concern for cardiogenic shock, resolved  Concern for NSTEMI  HFrEF 40%  - angiogram done on presentation: see results above  - trending troponin, improving  - cardiology following, thank you     Chronic conditions as noted above/below; home medications reviewed personally by me and restarted as appropriate  Electrolyte derangement:  Trending BMP; Mg; replacement prn  DVT ppx: SCDs, start lovenox when appropriate  Dispo:  GI follow-up.  FULL CODE    BP (!) 120/59 (BP Location: Right arm, Patient Position: Sitting)   Pulse 61   Temp 98.2 °F (36.8 °C) (Oral)   Resp 16   Ht 5' 2" (1.575 m)   Wt 123.1 kg (271 lb 6.2 oz)   SpO2 97%   BMI 49.64 kg/m²      Gen: alert, responsive, on RA  HEENT:  Eyes - no pallor  External ears with no lesions  Nares patent  Mouth - lips chapped  CV: RRR  Lungs: CTA B/L  Abd: +BS, soft, NT, ND  Ext: no atrophy or edema  Skin: warm, dry  Neuro: grossly intact  Psych: pleasant    Final Active Diagnoses:    Diagnosis Date Noted POA    PRINCIPAL PROBLEM:  Diverticulitis [K57.92] 12/23/2022 Yes    Gallbladder disorder [K82.9] 12/24/2022 Yes    Chronic obstructive pulmonary disease [J44.9] 01/21/2020 Yes    Hypertension [I10]  Yes    Essential hypertension [I10] 08/14/2019 Yes      Problems Resolved During this Admission:    Diagnosis Date Noted Date Resolved POA    Concern for NSTEMI (non-ST elevated myocardial infarction) [I21.4] 12/23/2022 12/26/2022 Yes       Discharged Condition: stable    Disposition: Home or Self Care    Follow Up:   Follow-up Information       Jorge Tran MD. Schedule an appointment as soon as possible for a " visit.    Specialties: Interventional Cardiology, Cardiology  Why: FOR FOLLOW UP OF YOUR HEART  Contact information:  [address]  [address]  [place] [address]  [phone]               Chris Raymond MD. Schedule an appointment as soon as possible for a visit.    Specialty: Gastroenterology  Why: FOR FOLLOW UP OF YOUR DIVERTICULITIS  Contact information:  [address]  [place] [address]  [phone]                           Patient Instructions:      Ambulatory referral/consult to Gastroenterology   Standing Status: Future   Referral Priority: Routine Referral Type: Consultation   Referral Reason: Specialty Services Required   Referred to Provider: CHRIS RAYMOND Requested Specialty: Gastroenterology   Number of Visits Requested: 1     Ambulatory referral/consult to Cardiology   Standing Status: Future   Referral Priority: Routine Referral Type: Consultation   Referral Reason: Specialty Services Required   Referred to Provider: DAX VIZCARRA Requested Specialty: Cardiology   Number of Visits Requested: 1     Medications:  Reconciled Home Medications:      Medication List        START taking these medications      amoxicillin-clavulanate 875-125mg 875-125 mg per tablet  Commonly known as: AUGMENTIN  Take 1 tablet by mouth 2 (two) times a day. for 5 days     chlorhexidine 0.12 % solution  Commonly known as: PERIDEX  Use as directed 15 mLs in the mouth or throat 2 (two) times daily. for 14 days     pantoprazole 40 MG tablet  Commonly known as: PROTONIX  Take 1 tablet (40 mg total) by mouth 2 (two) times daily.     sucralfate 100 mg/mL suspension  Commonly known as: CARAFATE  Take 10 mLs (1 g total) by mouth 4 (four) times daily before meals and nightly.            CHANGE how you take these medications      lisinopriL 10 MG tablet  Take 1 tablet (10 mg total) by mouth once daily. TAKE ONLY IF YOUR BLOOD PRESSURE GOES ABOVE 150 FOR THE TOP NUMBER.  What changed: additional instructions      mupirocin 2 % ointment  Commonly known as: BACTROBAN  by Nasal route 2 (two) times daily. for 14 days  What changed:   how to take this  when to take this  Another medication with the same name was removed. Continue taking this medication, and follow the directions you see here.            CONTINUE taking these medications      albuterol 90 mcg/actuation inhaler  Commonly known as: PROVENTIL/VENTOLIN HFA  Inhale 2 puffs into the lungs every 6 (six) hours as needed for Wheezing. Rescue     ANORO ELLIPTA 62.5-25 mcg/actuation Dsdv  Generic drug: umeclidinium-vilanteroL  Inhale 1 puff into the lungs once daily.     atorvastatin 10 MG tablet  Commonly known as: LIPITOR  Take 1 tablet (10 mg total) by mouth every evening.     buPROPion 150 MG TBSR 12 hr tablet  Commonly known as: WELLBUTRIN SR  Take 1 tablet (150 mg total) by mouth 2 (two) times daily.     ferrous sulfate 325 mg (65 mg iron) Tab tablet  Commonly known as: FEOSOL  Take 1 tablet (325 mg total) by mouth once daily.     FLUoxetine 40 MG capsule  Take 1 capsule (40 mg total) by mouth once daily.     gabapentin 300 MG capsule  Commonly known as: NEURONTIN  Take 1 capsule (300 mg total) by mouth 3 (three) times daily.     HYDROcodone-acetaminophen  mg per tablet  Commonly known as: NORCO  Take 1 tablet by mouth every 8 (eight) hours as needed for Pain.     loratadine 10 mg tablet  Commonly known as: CLARITIN  Take 10 mg by mouth once daily.     traZODone 50 MG tablet  Commonly known as: DESYREL  Take 2 tablets (100 mg total) by mouth nightly as needed for Insomnia.            STOP taking these medications      betamethasone dipropionate 0.05 % cream     fluticasone propionate 50 mcg/actuation nasal spray  Commonly known as: FLONASE     ZYRTEC-D ORAL            ASK your doctor about these medications      triamcinolone acetonide 0.1% 0.1 % cream  Commonly known as: KENALOG  Apply topically 2 (two) times daily.            Anitha Nelson MD  Department  of [facility] Medicine  [facility]

## 2022-12-26 NOTE — NURSING
Discharge instructions reviewed with pt. Questions answered. Copy of discharge instructions given to pt. Discharge home via wheelchair to vehicle. Accompanied by spouse.

## 2022-12-26 NOTE — PROGRESS NOTES
"[facility] Medicine  Progress Note    Patient Name: Iris Mueller  MRN: [id number]  Admission Date: 12/23/2022  1:09 PM  Attending Physician: Anitha Nelson MD  Face-to-Face encounter date: 12/26/2022    Assessment & Plan:   Iris Mueller is a 67 y.o. female with:    Active Hospital Problems    Diagnosis  POA    *Diverticulitis [K57.92]  Yes    Gallbladder disorder [K82.9]  Yes    Concern for NSTEMI (non-ST elevated myocardial infarction) [I21.4]  Yes    Chronic obstructive pulmonary disease [J44.9]  Yes    Hypertension [I10]  Yes    Essential hypertension [I10]  Yes      Resolved Hospital Problems   No resolved problems to display.     Septic Shock, resolve  Likely due to diverticulitis, improved  ?cystic duct obstruction, incidental  - continue zosyn; transition to po at discharge  - follow cultures  - HIDA unremarkable  - GI and general surgery following, thank you    Concern for cardiogenic shock, resolved  Concern for NSTEMI  HFrEF 40%  - angiogram done on presentation  - trending troponin, improving  - cardiology following, thank you    Chronic conditions as noted above/below; home medications reviewed personally by me and restarted as appropriate  Electrolyte derangement:  Trending BMP; Mg; replacement prn  DVT ppx: SCDs, start lovenox when appropriate  Dispo:  GI follow-up.  FULL CODE    Subjective:      Interval History:  pt has been off pressors for over 18 hours.  No cp, no sob, no abdominal pain.  She is ambulating around her room.  Likely discharge today.    12/26:  no cp, no sob, no abdominal pain.  She continues on levophed.  She looks remarkably well for her condition.      12/25:  Pt reports she feels "well."  She is in shock, however.  BP responding to IVFs so far.  Decreased UOP.  No CP.  No SOB while on supplemental oxygen.  Initially denies abdominal pain (but it is present on exam.)  No subjective fever.  No chills.    Review of Systems   All " "systems reviewed and are negative except as noted per above.    Objective:     Physical Exam  BP (!) 120/59 (BP Location: Right arm, Patient Position: Sitting)   Pulse 61   Temp 98.2 °F (36.8 °C) (Oral)   Resp 16   Ht 5' 2" (1.575 m)   Wt 123.1 kg (271 lb 6.2 oz)   SpO2 97%   BMI 49.64 kg/m²     Gen: alert, responsive, on RA  HEENT:  Eyes - no pallor  External ears with no lesions  Nares patent  Mouth - lips chapped  CV: RRR  Lungs: CTA B/L  Abd: +BS, soft, NT, ND  Ext: no atrophy or edema  Skin: warm, dry  Neuro: grossly intact  Psych: pleasant    Recent Labs   Lab 12/26/22 0318   WBC 7.61   HGB 9.9*   HCT 30.7*          Recent Labs   Lab 12/26/22 0318   CALCIUM 8.4*   ALBUMIN 2.9*   PROT 5.5*   *   K 3.9   CO2 22*      BUN 17   CREATININE 1.4   ALKPHOS 57   ALT 9*   AST 8*   BILITOT 0.6       No results found for: POCTGLUCOSE   Microbiology Results (last 7 days)       Procedure Component Value Units Date/Time    Blood culture [[id number]] Collected: 12/23/22 1807    Order Status: Completed Specimen: Blood from Peripheral, Right Hand Updated: 12/25/22 1832     Blood Culture, Routine No Growth to date      No Growth to date      No Growth to date    Narrative:      Collection has been rescheduled by RE1 at 12/23/2022 15:42 Reason: Pt   getting ultrasound  Collection has been rescheduled by RE1 at 12/23/2022 15:42 Reason: Pt   getting ultrasound    Blood culture [[id number]] Collected: 12/23/22 1807    Order Status: Completed Specimen: Blood from Antecubital, Left Arm Updated: 12/25/22 1832     Blood Culture, Routine No Growth to date      No Growth to date      No Growth to date    Narrative:      Collection has been rescheduled by RE1 at 12/23/2022 15:42 Reason: Pt   getting ultrasound  Collection has been rescheduled by RE1 at 12/23/2022 15:42 Reason: Pt   getting ultrasound    MRSA Screen by PCR [[id number]]  (Abnormal) Collected: 12/25/22 1318    Order Status: Completed Specimen: " Nasopharyngeal Swab from Nasal Updated: 12/25/22 1500     MRSA SCREEN BY PCR Positive     Comment: mrsap   result(s) called and verbal readback obtained from Lisa Howe RN/3IJOANIE by LS1 12/25/2022 15:00         Narrative:      mrsap   result(s) called and verbal readback obtained from Lisa Howe RN/JOHNNY by LS1 12/25/2022 15:00    Urine culture [[id number]] Collected: 12/24/22 1249    Order Status: Completed Specimen: Urine Updated: 12/25/22 0707     Urine Culture, Routine Multiple organisms isolated. None in predominance.  Repeat if      clinically necessary.    Narrative:      Specimen Source->Urine               Anitha Nelson MD  [facility] Hospitalist

## 2022-12-26 NOTE — CARE UPDATE
12/26/22 0722   Patient Assessment/Suction   Level of Consciousness (AVPU) alert   Respiratory Effort Normal   Expansion/Accessory Muscles/Retractions no use of accessory muscles   All Lung Fields Breath Sounds equal bilaterally   Rhythm/Pattern, Respiratory unlabored   Cough Frequency no cough   PRE-TX-O2   Device (Oxygen Therapy) room air   SpO2 99 %   Pulse Oximetry Type Continuous   $ Pulse Oximetry - Multiple Charge Pulse Oximetry - Multiple   Pulse 70   Resp (!) 22   Positioning   Body Position position changed independently   Head of Bed (HOB) Positioning HOB elevated   Positioning/Transfer Devices pillows;in use   Aerosol Therapy   $ Aerosol Therapy Charges Aerosol Treatment   Daily Review of Necessity (SVN) completed   Respiratory Treatment Status (SVN) given   Treatment Route (SVN) mask;oxygen   Patient Position (SVN) HOB elevated   Post Treatment Assessment (SVN) breath sounds unchanged   Signs of Intolerance (SVN) none   Breath Sounds Post-Respiratory Treatment   Throughout All Fields Post-Treatment All Fields   Throughout All Fields Post-Treatment no change   Post-treatment Heart Rate (beats/min) 67   Post-treatment Resp Rate (breaths/min) 20

## 2022-12-26 NOTE — PLAN OF CARE
Discharge orders and chart reviewed with no further post-acute discharge needs identified at this time.  At this time, patient is cleared for discharge from Case Management standpoint.        12/26/22 1427   Final Note   Assessment Type Final Discharge Note   Anticipated Discharge Disposition Home   Hospital Resources/Appts/Education Provided   (Patient instructed to make post hospital follow up appointment with their PCP in 7 to 10 days from discharge)   Post-Acute Status   Coverage [facility]   Discharge Delays None known at this time

## 2022-12-27 ENCOUNTER — PATIENT MESSAGE (OUTPATIENT)
Dept: FAMILY MEDICINE | Facility: CLINIC | Age: 67
End: 2022-12-27
Payer: MEDICARE

## 2022-12-28 LAB
BACTERIA BLD CULT: NORMAL
BACTERIA BLD CULT: NORMAL

## 2023-01-06 DIAGNOSIS — M79.7 FIBROMYALGIA: ICD-10-CM

## 2023-01-06 DIAGNOSIS — M25.561 RIGHT KNEE PAIN, UNSPECIFIED CHRONICITY: ICD-10-CM

## 2023-01-06 RX ORDER — HYDROCODONE BITARTRATE AND ACETAMINOPHEN 10; 325 MG/1; MG/1
1 TABLET ORAL EVERY 8 HOURS PRN
Qty: 90 TABLET | Refills: 0 | Status: SHIPPED | OUTPATIENT
Start: 2023-01-06 | End: 2023-02-08 | Stop reason: SDUPTHER

## 2023-01-06 NOTE — TELEPHONE ENCOUNTER
----- Message from Tana Brown MA sent at 1/6/2023 10:00 AM CST -----  Regarding: FW: refill    ----- Message -----  From: Andrea Barrera MA  Sent: 1/6/2023   9:59 AM CST  To: Elkin You Staff  Subject: refill                                           Pt needs hydrocodone sent to [facility]. [phone]

## 2023-01-19 DIAGNOSIS — I10 ESSENTIAL HYPERTENSION: ICD-10-CM

## 2023-01-19 RX ORDER — LISINOPRIL 10 MG/1
10 TABLET ORAL DAILY
Qty: 90 TABLET | Refills: 3 | Status: SHIPPED | OUTPATIENT
Start: 2023-01-19 | End: 2023-05-08 | Stop reason: SDUPTHER

## 2023-01-19 NOTE — TELEPHONE ENCOUNTER
----- Message from Tana Brown MA sent at 1/19/2023  9:41 AM CST -----    ----- Message -----  From: Emily Uribe  Sent: 1/19/2023   9:38 AM CST  To: Elkin You Staff    Refill on: [email or web address]    [id number] [address] AT [place] [address]    [phone]

## 2023-02-08 ENCOUNTER — OFFICE VISIT (OUTPATIENT)
Dept: FAMILY MEDICINE | Facility: CLINIC | Age: 68
End: 2023-02-08
Payer: MEDICARE

## 2023-02-08 VITALS
DIASTOLIC BLOOD PRESSURE: 86 MMHG | HEART RATE: 80 BPM | BODY MASS INDEX: 45.82 KG/M2 | HEIGHT: 62 IN | SYSTOLIC BLOOD PRESSURE: 136 MMHG | WEIGHT: 249 LBS

## 2023-02-08 DIAGNOSIS — M25.561 RIGHT KNEE PAIN, UNSPECIFIED CHRONICITY: ICD-10-CM

## 2023-02-08 DIAGNOSIS — I10 ESSENTIAL HYPERTENSION: Primary | ICD-10-CM

## 2023-02-08 DIAGNOSIS — M79.7 FIBROMYALGIA: ICD-10-CM

## 2023-02-08 DIAGNOSIS — M17.0 PRIMARY OSTEOARTHRITIS OF BOTH KNEES: ICD-10-CM

## 2023-02-08 DIAGNOSIS — E66.01 MORBID OBESITY: ICD-10-CM

## 2023-02-08 DIAGNOSIS — N18.32 STAGE 3B CHRONIC KIDNEY DISEASE: ICD-10-CM

## 2023-02-08 DIAGNOSIS — I42.9 CARDIOMYOPATHY, UNSPECIFIED TYPE: ICD-10-CM

## 2023-02-08 DIAGNOSIS — J43.1 PANLOBULAR EMPHYSEMA: ICD-10-CM

## 2023-02-08 DIAGNOSIS — I35.0 NONRHEUMATIC AORTIC VALVE STENOSIS: ICD-10-CM

## 2023-02-08 DIAGNOSIS — F51.01 PRIMARY INSOMNIA: ICD-10-CM

## 2023-02-08 PROCEDURE — 3079F DIAST BP 80-89 MM HG: CPT | Mod: CPTII,S$GLB,, | Performed by: NURSE PRACTITIONER

## 2023-02-08 PROCEDURE — 3075F SYST BP GE 130 - 139MM HG: CPT | Mod: CPTII,S$GLB,, | Performed by: NURSE PRACTITIONER

## 2023-02-08 PROCEDURE — 1160F RVW MEDS BY RX/DR IN RCRD: CPT | Mod: CPTII,S$GLB,, | Performed by: NURSE PRACTITIONER

## 2023-02-08 PROCEDURE — 3075F PR MOST RECENT SYSTOLIC BLOOD PRESS GE 130-139MM HG: ICD-10-PCS | Mod: CPTII,S$GLB,, | Performed by: NURSE PRACTITIONER

## 2023-02-08 PROCEDURE — 1159F MED LIST DOCD IN RCRD: CPT | Mod: CPTII,S$GLB,, | Performed by: NURSE PRACTITIONER

## 2023-02-08 PROCEDURE — 4010F PR ACE/ARB THEARPY RXD/TAKEN: ICD-10-PCS | Mod: CPTII,S$GLB,, | Performed by: NURSE PRACTITIONER

## 2023-02-08 PROCEDURE — 3079F PR MOST RECENT DIASTOLIC BLOOD PRESSURE 80-89 MM HG: ICD-10-PCS | Mod: CPTII,S$GLB,, | Performed by: NURSE PRACTITIONER

## 2023-02-08 PROCEDURE — 99214 OFFICE O/P EST MOD 30 MIN: CPT | Mod: S$GLB,,, | Performed by: NURSE PRACTITIONER

## 2023-02-08 PROCEDURE — 3008F BODY MASS INDEX DOCD: CPT | Mod: CPTII,S$GLB,, | Performed by: NURSE PRACTITIONER

## 2023-02-08 PROCEDURE — 99214 PR OFFICE/OUTPT VISIT, EST, LEVL IV, 30-39 MIN: ICD-10-PCS | Mod: S$GLB,,, | Performed by: NURSE PRACTITIONER

## 2023-02-08 PROCEDURE — 3008F PR BODY MASS INDEX (BMI) DOCUMENTED: ICD-10-PCS | Mod: CPTII,S$GLB,, | Performed by: NURSE PRACTITIONER

## 2023-02-08 PROCEDURE — 1159F PR MEDICATION LIST DOCUMENTED IN MEDICAL RECORD: ICD-10-PCS | Mod: CPTII,S$GLB,, | Performed by: NURSE PRACTITIONER

## 2023-02-08 PROCEDURE — 4010F ACE/ARB THERAPY RXD/TAKEN: CPT | Mod: CPTII,S$GLB,, | Performed by: NURSE PRACTITIONER

## 2023-02-08 PROCEDURE — 1160F PR REVIEW ALL MEDS BY PRESCRIBER/CLIN PHARMACIST DOCUMENTED: ICD-10-PCS | Mod: CPTII,S$GLB,, | Performed by: NURSE PRACTITIONER

## 2023-02-08 RX ORDER — TRAZODONE HYDROCHLORIDE 50 MG/1
100 TABLET ORAL NIGHTLY PRN
Qty: 180 TABLET | Refills: 1 | Status: SHIPPED | OUTPATIENT
Start: 2023-02-08 | End: 2023-07-07 | Stop reason: SDUPTHER

## 2023-02-08 RX ORDER — HYDROCODONE BITARTRATE AND ACETAMINOPHEN 10; 325 MG/1; MG/1
1 TABLET ORAL EVERY 8 HOURS PRN
Qty: 90 TABLET | Refills: 0 | Status: SHIPPED | OUTPATIENT
Start: 2023-02-08 | End: 2023-03-08 | Stop reason: SDUPTHER

## 2023-02-08 NOTE — PATIENT INSTRUCTIONS
Jorge Tran MD- call to schedule appointment  [address]  [address]  [place] [facility] [address]  Phone: [phone]    Have fasting labs drawn within the next month    Collect Cologuard stool specimen and mail it

## 2023-02-08 NOTE — PROGRESS NOTES
SUBJECTIVE:    Patient ID: Iris Mueller is a 67 y.o. female.    Chief Complaint: Hypertension (3mth, no bottles- has not taken Norco x5 days// SW)    Pt here for regular f/u- HTN/COPD/OA    Patient had hospital admission in December with septic shock from suspected diverticulitis.  Underwent emergent coronary angiogram due to positive troponins which showed mild LAD and RCA disease,   Echo results:· estimated ejection fraction is 40%. There is a anterior apical segment that is hypo to akinetic with a wall motion abnormality  · Grade II left ventricular diastolic dysfunction. Mean left atrial pressure 20 mm Hg. Mild mitral annular calcification  · There are segmental left ventricular wall motion abnormalities.  · Normal right ventricular size with normal right ventricular systolic function.  · Moderate left atrial enlargement.  · Moderate aortic regurgitation.  · There is moderate aortic valve stenosis.  · Aortic valve area is 1.55 cm2; peak velocity is 2.88 m/s; mean gradient is 15 mmHg.  · Moderate mitral regurgitation.  · There is mild pulmonary hypertension.  · Mild tricuspid regurgitation    Pt required pressors for a day or 2 and then improved.     Pt reports overall she's feeling much better. Pt states she was told she didn't need to f/u with cardiology or GI though discharge summary states f/u with Dr. Tran and Dr. Ko. Pt thinks she just had a stomach virus- had been having cramping severe pain for about 1 day before presenting to UC and then referred to ER. Reports no longer having any abd pain, having regular stools. Reports normal appetite, did lose about 15-20lbs around the hospital stay. Denies CP or change in SOB with heavy exertion    smoke 2-5 cigs/day.    Continues with chronic knee pain, denies any recent falls.    Reports has cologuard kit at home she still needs to collect        No results displayed because visit has over 200 results.      Admission on 12/22/2022, Discharged on  12/23/2022   Component Date Value Ref Range Status    WBC 12/22/2022 13.13 (H)  3.90 - 12.70 K/uL Final    RBC 12/22/2022 4.92  4.00 - 5.40 M/uL Final    Hemoglobin 12/22/2022 15.4  12.0 - 16.0 g/dL Final    Hematocrit 12/22/2022 45.8  37.0 - 48.5 % Final    MCV 12/22/2022 93  82 - 98 fL Final    MCH 12/22/2022 31.3 (H)  27.0 - 31.0 pg Final    MCHC 12/22/2022 33.6  32.0 - 36.0 g/dL Final    RDW 12/22/2022 14.3  11.5 - 14.5 % Final    Platelets 12/22/2022 338  150 - 450 K/uL Final    MPV 12/22/2022 9.3  9.2 - 12.9 fL Final    Immature Granulocytes 12/22/2022 0.6 (H)  0.0 - 0.5 % Final    Gran # (ANC) 12/22/2022 11.3 (H)  1.8 - 7.7 K/uL Final    Immature Grans (Abs) 12/22/2022 0.08 (H)  0.00 - 0.04 K/uL Final    Lymph # 12/22/2022 0.8 (L)  1.0 - 4.8 K/uL Final    Mono # 12/22/2022 0.9  0.3 - 1.0 K/uL Final    Eos # 12/22/2022 0.0  0.0 - 0.5 K/uL Final    Baso # 12/22/2022 0.02  0.00 - 0.20 K/uL Final    nRBC 12/22/2022 0  0 /100 WBC Final    Gran % 12/22/2022 86.0 (H)  38.0 - 73.0 % Final    Lymph % 12/22/2022 6.2 (L)  18.0 - 48.0 % Final    Mono % 12/22/2022 6.9  4.0 - 15.0 % Final    Eosinophil % 12/22/2022 0.1  0.0 - 8.0 % Final    Basophil % 12/22/2022 0.2  0.0 - 1.9 % Final    Differential Method 12/22/2022 Automated   Final    Sodium 12/22/2022 126 (L)  136 - 145 mmol/L Final    Potassium 12/22/2022 3.8  3.5 - 5.1 mmol/L Final    Chloride 12/22/2022 89 (L)  95 - 110 mmol/L Final    CO2 12/22/2022 22 (L)  23 - 29 mmol/L Final    Glucose 12/22/2022 110  70 - 110 mg/dL Final    BUN 12/22/2022 11  8 - 23 mg/dL Final    Creatinine 12/22/2022 1.1  0.5 - 1.4 mg/dL Final    Calcium 12/22/2022 9.9  8.7 - 10.5 mg/dL Final    Total Protein 12/22/2022 8.2  6.0 - 8.4 g/dL Final    Albumin 12/22/2022 4.4  3.5 - 5.2 g/dL Final    Total Bilirubin 12/22/2022 1.3 (H)  0.1 - 1.0 mg/dL Final    Alkaline Phosphatase 12/22/2022 115  55 - 135 U/L Final    AST 12/22/2022 22  10 - 40 U/L Final    ALT 12/22/2022 13  10 - 44 U/L Final     Anion Gap 12/22/2022 15  8 - 16 mmol/L Final    eGFR 12/22/2022 54 (A)  >60 mL/min/1.73 m^2 Final    Lipase 12/22/2022 6  4 - 60 U/L Final    Troponin I 12/22/2022 0.730 (H)  0.000 - 0.026 ng/mL Final    Specimen UA 12/22/2022 Urine, Clean Catch   Final    Color, UA 12/22/2022 Yellow  Yellow, Straw, Sonal Final    Appearance, UA 12/22/2022 Clear  Clear Final    pH, UA 12/22/2022 8.0  5.0 - 8.0 Final    Specific Gravity, UA 12/22/2022 1.010  1.005 - 1.030 Final    Protein, UA 12/22/2022 2+ (A)  Negative Final    Glucose, UA 12/22/2022 Negative  Negative Final    Ketones, UA 12/22/2022 Negative  Negative Final    Bilirubin (UA) 12/22/2022 Negative  Negative Final    Occult Blood UA 12/22/2022 1+ (A)  Negative Final    Nitrite, UA 12/22/2022 Negative  Negative Final    Urobilinogen, UA 12/22/2022 Negative  <2.0 EU/dL Final    Leukocytes, UA 12/22/2022 Negative  Negative Final    Osmolality 12/22/2022 273 (L)  275 - 295 mOsm/kg Final    BSA 12/23/2022 2.23  m2 Final    TDI SEPTAL 12/23/2022 0.06  m/s Final    LV LATERAL E/E' RATIO 12/23/2022 21.80  m/s Final    LV SEPTAL E/E' RATIO 12/23/2022 18.17  m/s Final    Left Ventricular Outflow Tract Shraddha* 12/23/2022 1.03  cm/s Final    Left Ventricular Outflow Tract Shraddha* 12/23/2022 4.94  mmHg Final    TV mean gradient 12/23/2022 17  mmHg Final    Pulmonary Valve Mean Velocity 12/23/2022 0.68  m/s Final    AORTIC VALVE CUSP SEPERATION 12/23/2022 1.51  cm Final    TDI LATERAL 12/23/2022 0.05  m/s Final    PV PEAK VELOCITY 12/23/2022 1.03  cm/s Final    LVIDd 12/23/2022 4.84  3.5 - 6.0 cm Final    IVS 12/23/2022 1.03  0.6 - 1.1 cm Final    Posterior Wall 12/23/2022 1.02  0.6 - 1.1 cm Final    Ao root annulus 12/23/2022 2.84  cm Final    LVIDs 12/23/2022 3.89  2.1 - 4.0 cm Final    FS 12/23/2022 20  28 - 44 % Final    Sinus 12/23/2022 2.71  cm Final    STJ 12/23/2022 2.30  cm Final    Ascending aorta 12/23/2022 2.70  cm Final    LV mass 12/23/2022 178.40  g Final    RVDD 12/23/2022  "2.45  cm Final    TAPSE 12/23/2022 2.97  cm Final    Right ventricular length in diasto* 12/23/2022 4.13  cm Final    RV S' 12/23/2022 0.01  cm/s Final    Left Ventricle Relative Wall Thick* 12/23/2022 0.42  cm Final    AV regurgitation pressure 1/2 time 12/23/2022 457.038450431524251  ms Final    AV mean gradient 12/23/2022 15  mmHg Final    AV valve area 12/23/2022 1.55  cm2 Final    AV Velocity Ratio 12/23/2022 0.51   Final    AV index (prosthetic) 12/23/2022 0.50   Final    MV mean gradient 12/23/2022 3  mmHg Final    MV valve area p 1/2 method 12/23/2022 6.57  cm2 Final    MV valve area by continuity eq 12/23/2022 3.61  cm2 Final    E/A ratio 12/23/2022 1.11   Final    Mean e' 12/23/2022 0.06  m/s Final    E wave deceleration time 12/23/2022 137.91  msec Final    IVRT 12/23/2022 117.98  msec Final    MV "A" wave duration 12/23/2022 144.657569368603060  msec Final    Pulm vein S/D ratio 12/23/2022 1.31   Final    LVOT diameter 12/23/2022 1.98  cm Final    LVOT area 12/23/2022 3.1  cm2 Final    LVOT peak tenzin 12/23/2022 1.46  m/s Final    LVOT peak VTI 12/23/2022 26.30  cm Final    Ao peak tenzin 12/23/2022 2.88  m/s Final    Ao VTI 12/23/2022 52.3  cm Final    Mr max tenzin 12/23/2022 7.19  m/s Final    LVOT stroke volume 12/23/2022 80.94  cm3 Final    AV peak gradient 12/23/2022 33  mmHg Final    MV peak gradient 12/23/2022 8  mmHg Final    E/E' ratio 12/23/2022 19.82  m/s Final    MV Peak E Tenzin 12/23/2022 1.09  m/s Final    AR Max Tenzin 12/23/2022 4.94  m/s Final    TR Max Tenzin 12/23/2022 2.82  m/s Final    MV VTI 12/23/2022 22.4  cm Final    MV stenosis pressure 1/2 time 12/23/2022 33.47  ms Final    MV Peak A Tenzin 12/23/2022 0.98  m/s Final    PV Peak S Tenzin 12/23/2022 0.67  m/s Final    PV Peak D Tenzin 12/23/2022 0.51  m/s Final    LV Systolic Volume 12/23/2022 65.58  mL Final    LV Systolic Volume Index 12/23/2022 31.1  mL/m2 Final    LV Diastolic Volume 12/23/2022 109.59  mL Final    LV Diastolic Volume Index " 12/23/2022 51.94  mL/m2 Final    LV Mass Index 12/23/2022 85  g/m2 Final    Right Atrium Volume Systolic 12/23/2022 15.26  mL Final    RA Major Axis 12/23/2022 4.32  cm Final    Left Atrium Minor Axis 12/23/2022 6.87  cm Final    Left Atrium Major Axis 12/23/2022 4.59  cm Final    Triscuspid Valve Regurgitation Pea* 12/23/2022 32  mmHg Final    LA Volume Index (Mod) 12/23/2022 67.1  mL/m2 Final    LA volume (mod) 12/23/2022 141.68  cm3 Final    RA Width 12/23/2022 2.90  cm Final    Right Atrial Pressure (from IVC) 12/23/2022 15  mmHg Final    EF 12/23/2022 40  % Final    TV rest pulmonary artery pressure 12/23/2022 47  mmHg Final    RBC, UA 12/22/2022 5 (H)  0 - 4 /hpf Final    WBC, UA 12/22/2022 2  0 - 5 /hpf Final    Bacteria 12/22/2022 Rare  None-Occ /hpf Final    Squam Epithel, UA 12/22/2022 7  /hpf Final    Hyaline Casts, UA 12/22/2022 0  0-1/lpf /lpf Final    Amorphous, UA 12/22/2022 Few  None-Moderate Final    Microscopic Comment 12/22/2022 SEE COMMENT   Final    Sodium 12/22/2022 129 (L)  136 - 145 mmol/L Final    Potassium 12/22/2022 3.6  3.5 - 5.1 mmol/L Final    Chloride 12/22/2022 96  95 - 110 mmol/L Final    CO2 12/22/2022 21 (L)  23 - 29 mmol/L Final    Glucose 12/22/2022 111 (H)  70 - 110 mg/dL Final    BUN 12/22/2022 11  8 - 23 mg/dL Final    Creatinine 12/22/2022 1.0  0.5 - 1.4 mg/dL Final    Calcium 12/22/2022 9.2  8.7 - 10.5 mg/dL Final    Anion Gap 12/22/2022 12  8 - 16 mmol/L Final    eGFR 12/22/2022 >60  >60 mL/min/1.73 m^2 Final    Troponin I 12/22/2022 0.697 (H)  0.000 - 0.026 ng/mL Final    Sodium 12/23/2022 128 (L)  136 - 145 mmol/L Final    Potassium 12/23/2022 3.6  3.5 - 5.1 mmol/L Final    Chloride 12/23/2022 95  95 - 110 mmol/L Final    CO2 12/23/2022 21 (L)  23 - 29 mmol/L Final    Glucose 12/23/2022 108  70 - 110 mg/dL Final    BUN 12/23/2022 12  8 - 23 mg/dL Final    Creatinine 12/23/2022 1.0  0.5 - 1.4 mg/dL Final    Calcium 12/23/2022 9.1  8.7 - 10.5 mg/dL Final    Anion Gap  12/23/2022 12  8 - 16 mmol/L Final    eGFR 12/23/2022 >60  >60 mL/min/1.73 m^2 Final    WBC 12/23/2022 10.23  3.90 - 12.70 K/uL Final    RBC 12/23/2022 4.43  4.00 - 5.40 M/uL Final    Hemoglobin 12/23/2022 13.9  12.0 - 16.0 g/dL Final    Hematocrit 12/23/2022 41.9  37.0 - 48.5 % Final    MCV 12/23/2022 95  82 - 98 fL Final    MCH 12/23/2022 31.4 (H)  27.0 - 31.0 pg Final    MCHC 12/23/2022 33.2  32.0 - 36.0 g/dL Final    RDW 12/23/2022 14.3  11.5 - 14.5 % Final    Platelets 12/23/2022 294  150 - 450 K/uL Final    MPV 12/23/2022 9.2  9.2 - 12.9 fL Final    Immature Granulocytes 12/23/2022 0.5  0.0 - 0.5 % Final    Gran # (ANC) 12/23/2022 8.5 (H)  1.8 - 7.7 K/uL Final    Immature Grans (Abs) 12/23/2022 0.05 (H)  0.00 - 0.04 K/uL Final    Lymph # 12/23/2022 0.9 (L)  1.0 - 4.8 K/uL Final    Mono # 12/23/2022 0.8  0.3 - 1.0 K/uL Final    Eos # 12/23/2022 0.0  0.0 - 0.5 K/uL Final    Baso # 12/23/2022 0.02  0.00 - 0.20 K/uL Final    nRBC 12/23/2022 0  0 /100 WBC Final    Gran % 12/23/2022 82.9 (H)  38.0 - 73.0 % Final    Lymph % 12/23/2022 9.0 (L)  18.0 - 48.0 % Final    Mono % 12/23/2022 7.3  4.0 - 15.0 % Final    Eosinophil % 12/23/2022 0.1  0.0 - 8.0 % Final    Basophil % 12/23/2022 0.2  0.0 - 1.9 % Final    Differential Method 12/23/2022 Automated   Final    Troponin I 12/23/2022 0.648 (H)  0.000 - 0.026 ng/mL Final    TSH 12/23/2022 1.889  0.400 - 4.000 uIU/mL Final    Magnesium 12/23/2022 1.6  1.6 - 2.6 mg/dL Final    Sodium 12/23/2022 130 (L)  136 - 145 mmol/L Final    Potassium 12/23/2022 4.7  3.5 - 5.1 mmol/L Final    Chloride 12/23/2022 95  95 - 110 mmol/L Final    CO2 12/23/2022 24  23 - 29 mmol/L Final    Glucose 12/23/2022 105  70 - 110 mg/dL Final    BUN 12/23/2022 13  8 - 23 mg/dL Final    Creatinine 12/23/2022 1.1  0.5 - 1.4 mg/dL Final    Calcium 12/23/2022 9.4  8.7 - 10.5 mg/dL Final    Anion Gap 12/23/2022 11  8 - 16 mmol/L Final    eGFR 12/23/2022 54 (A)  >60 mL/min/1.73 m^2 Final    Troponin I  2022 0.523 (H)  0.000 - 0.026 ng/mL Final   Office Visit on 2022   Component Date Value Ref Range Status    Rapid Influenza A Ag 2022 Negative  Negative Final    Rapid Influenza B Ag 2022 Negative  Negative Final     Acceptable 2022 Yes   Final    SARS Coronavirus 2 Antigen 2022 Negative  Negative Final     Acceptable 2022 Yes   Final       Past Medical History:   Diagnosis Date    Arthritis     Asthma     COPD (chronic obstructive pulmonary disease)     Encounter for blood transfusion     Hypertension     Wound infection 2019    Right knee     Past Surgical History:   Procedure Laterality Date    ANGIOGRAM, CORONARY, WITH LEFT HEART CATHETERIZATION N/A 2022    Procedure: Angiogram, Coronary, with Left Heart Cath;  Surgeon: Jorge Tran MD;  Location: [facility] CATH/EP LAB;  Service: Cardiology;  Laterality: N/A;     SECTION      JOINT REPLACEMENT      Knee    KNEE ARTHROPLASTY Right 2019    Procedure: ARTHROPLASTY, KNEE;  Surgeon: Delio Chung MD;  Location: [facility] OR;  Service: Orthopedics;  Laterality: Right;  anesthesia:  general and block    REPLACEMENT OF WOUND VACUUM-ASSISTED CLOSURE DEVICE Right 2019    Procedure: REPLACEMENT, WOUND VAC;  Surgeon: Delio Chung MD;  Location: [facility] OR;  Service: Orthopedics;  Laterality: Right;    TONSILLECTOMY      VENTRICULOGRAM, LEFT  2022    Procedure: Ventriculogram, Left;  Surgeon: Jorge Tran MD;  Location: [facility] CATH/EP LAB;  Service: Cardiology;;     Family History   Problem Relation Age of Onset    Alzheimer's disease Mother        The CVD Risk score (D'Agostino, et al., 2008) failed to calculate for the following reasons:    The patient has a prior MI, stroke, CHF, or peripheral vascular disease diagnosis     Marital Status:   Alcohol History:  reports current alcohol use.  Tobacco History:  reports that she has been smoking cigarettes. She has a  15.00 pack-year smoking history. She has never used smokeless tobacco.  Drug History:  reports no history of drug use.    Health Maintenance Topics with due status: Not Due       Topic Last Completion Date    TETANUS VACCINE 09/18/2019    Lipid Panel 11/07/2022    Hemoglobin A1c (Diabetic Prevention Screening) 12/23/2022     Immunization History   Administered Date(s) Administered    COVID-19, MRNA, LN-S, PF (MODERNA FULL 0.5 ML DOSE) 02/26/2021, 03/26/2021, 12/14/2021    Influenza - High Dose - PF (65 years and older) 09/18/2019    Influenza - Quadrivalent - High Dose - PF (65 years and older) 08/20/2020, 10/25/2021, 11/08/2022    PPD Test 09/04/2019    Pneumococcal Conjugate - 13 Valent 03/06/2019    Pneumococcal Polysaccharide - 23 Valent 07/21/2020    Tdap 09/18/2019       Review of patient's allergies indicates:  No Known Allergies    Current Outpatient Medications:     albuterol (PROVENTIL/VENTOLIN HFA) 90 mcg/actuation inhaler, Inhale 2 puffs into the lungs every 6 (six) hours as needed for Wheezing. Rescue, Disp: 18 g, Rfl: 2    ANORO ELLIPTA 62.5-25 mcg/actuation DsDv, Inhale 1 puff into the lungs once daily., Disp: 60 each, Rfl: 5    atorvastatin (LIPITOR) 10 MG tablet, Take 1 tablet (10 mg total) by mouth every evening., Disp: 90 tablet, Rfl: 1    buPROPion (WELLBUTRIN SR) 150 MG TBSR 12 hr tablet, Take 1 tablet (150 mg total) by mouth 2 (two) times daily., Disp: 180 tablet, Rfl: 1    ferrous sulfate (FEOSOL) 325 mg (65 mg iron) Tab tablet, Take 1 tablet (325 mg total) by mouth once daily., Disp: , Rfl: 0    FLUoxetine 40 MG capsule, Take 1 capsule (40 mg total) by mouth once daily., Disp: 90 capsule, Rfl: 1    gabapentin (NEURONTIN) 300 MG capsule, Take 1 capsule (300 mg total) by mouth 3 (three) times daily., Disp: 270 capsule, Rfl: 1    lisinopriL 10 MG tablet, Take 1 tablet (10 mg total) by mouth once daily., Disp: 90 tablet, Rfl: 3    loratadine (CLARITIN) 10 mg tablet, Take 10 mg by mouth once  daily., Disp: , Rfl:     pantoprazole (PROTONIX) 40 MG tablet, Take 1 tablet (40 mg total) by mouth 2 (two) times daily., Disp: 60 tablet, Rfl: 0    sucralfate (CARAFATE) 100 mg/mL suspension, Take 10 mLs (1 g total) by mouth 4 (four) times daily before meals and nightly., Disp: 1200 mL, Rfl: 0    triamcinolone acetonide 0.1% (KENALOG) 0.1 % cream, Apply topically 2 (two) times daily., Disp: 45 g, Rfl: 2    HYDROcodone-acetaminophen (NORCO)  mg per tablet, Take 1 tablet by mouth every 8 (eight) hours as needed for Pain., Disp: 90 tablet, Rfl: 0    traZODone (DESYREL) 50 MG tablet, Take 2 tablets (100 mg total) by mouth nightly as needed for Insomnia., Disp: 180 tablet, Rfl: 1  No current facility-administered medications for this visit.    Facility-Administered Medications Ordered in Other Visits:     lidocaine (PF) 10 mg/ml (1%) injection 5 mg, 0.5 mL, Intradermal, Once, Azeem Anderson MD    Review of Systems   Constitutional:  Negative for appetite change, chills, fever and unexpected weight change (lost over 20lbs during/after hospital stay).   HENT:  Negative for postnasal drip, sore throat and trouble swallowing.    Eyes:  Negative for visual disturbance.   Respiratory:  Positive for cough (occasional cough) and shortness of breath (with exertion or walking longer distances). Negative for wheezing.    Cardiovascular:  Negative for chest pain, palpitations and leg swelling.   Gastrointestinal:  Negative for abdominal pain, blood in stool, constipation, diarrhea, nausea and vomiting.   Genitourinary:  Negative for dysuria and hematuria.   Musculoskeletal:  Positive for arthralgias (chronic right knee pain) and gait problem (mobility limited d/t knee pain, walks with cane).   Skin:  Negative for rash.   Neurological:  Negative for dizziness, syncope, speech difficulty, numbness and headaches.   Psychiatric/Behavioral:  Negative for dysphoric mood. The patient is not nervous/anxious.         Objective:     "  Vitals:    02/08/23 0929   BP: 136/86   Pulse: 80   Weight: 112.9 kg (249 lb)   Height: 5' 2" (1.575 m)     Physical Exam  Vitals and nursing note reviewed.   Constitutional:       General: She is not in acute distress.     Appearance: She is well-developed.      Comments: Morbidly obese white female, appears older than stated age   HENT:      Head: Normocephalic and atraumatic.   Neck:      Vascular: No carotid bruit.   Cardiovascular:      Rate and Rhythm: Normal rate and regular rhythm.      Heart sounds: Murmur heard.     No friction rub. No gallop.   Pulmonary:      Effort: Pulmonary effort is normal. No respiratory distress.      Breath sounds: Normal breath sounds. No wheezing or rales.   Abdominal:      General: There is no distension.      Palpations: Abdomen is soft.      Tenderness: There is no abdominal tenderness.   Musculoskeletal:      Cervical back: Neck supple.      Right knee: No effusion or erythema. Normal range of motion.      Right lower leg: No edema.      Left lower leg: No edema.   Lymphadenopathy:      Cervical: No cervical adenopathy.   Skin:     General: Skin is warm and dry.      Findings: No rash.   Neurological:      General: No focal deficit present.      Mental Status: She is alert and oriented to person, place, and time.      Gait: Gait abnormal (antalgic gait).         Assessment:       1. Essential hypertension    2. Cardiomyopathy, unspecified type    3. Panlobular emphysema    4. Stage 3b chronic kidney disease    5. Primary osteoarthritis of both knees    6. Primary insomnia    7. Nonrheumatic aortic valve stenosis    8. Morbid obesity           Plan:       1. Essential hypertension  -BP controlled  -     CBC Auto Differential; Future; Expected date: 02/08/2023  -     Comprehensive Metabolic Panel; Future; Expected date: 02/08/2023  -     TSH w/reflex to FT4; Future; Expected date: 02/08/2023  -     Urinalysis, Reflex to Urine Culture Urine, Clean Catch; Future; Expected " date: 02/08/2023  -     Microalbumin/Creatinine Ratio, Urine; Future; Expected date: 02/08/2023  -     Lipid Panel; Future; Expected date: 02/08/2023    2. Cardiomyopathy, unspecified type  -reviewed with patient echo from December which showed EF 40% with moderate AS and MR.  Patient advised I would recommend cardiology follow-up and encouraged scheduled with Dr. Knight  -     Ambulatory referral/consult to Cardiology; Future; Expected date: 02/15/2023    3. Panlobular emphysema   -stable, patient denies any significant symptoms last she over exerts herself.  Still smoking a few cigarettes a day however    4. Stage 3b chronic kidney disease   -renal function declined some during December hospital stay, recommend repeat labs within the next few weeks    5. Primary osteoarthritis of both knees   -stable chronic knee pain    6. Primary insomnia  -stable on med  -     traZODone (DESYREL) 50 MG tablet; Take 2 tablets (100 mg total) by mouth nightly as needed for Insomnia.  Dispense: 180 tablet; Refill: 1    7. Nonrheumatic aortic valve stenosis   -moderate AS and MR on recent echo.  Patient denies chest pain, dizziness or syncope    8. Morbid obesity   -patient has lost some weight after December illness.  Continue to encourage healthy diet and cut out processed foods    Follow up in about 3 months (around 5/8/2023).          Counseled on age and gender appropriate medical preventative services, including cancer screenings, immunizations, overall nutritional health, need for a consistent exercise regimen and an overall push towards maintaining a vigorous and active lifestyle.      2/12/2023 Roselyn Cote NP

## 2023-03-08 DIAGNOSIS — M25.561 RIGHT KNEE PAIN, UNSPECIFIED CHRONICITY: ICD-10-CM

## 2023-03-08 DIAGNOSIS — M79.7 FIBROMYALGIA: ICD-10-CM

## 2023-03-08 RX ORDER — HYDROCODONE BITARTRATE AND ACETAMINOPHEN 10; 325 MG/1; MG/1
1 TABLET ORAL EVERY 8 HOURS PRN
Qty: 90 TABLET | Refills: 0 | Status: SHIPPED | OUTPATIENT
Start: 2023-03-10 | End: 2023-05-08 | Stop reason: SDUPTHER

## 2023-03-08 RX ORDER — HYDROCODONE BITARTRATE AND ACETAMINOPHEN 10; 325 MG/1; MG/1
1 TABLET ORAL EVERY 8 HOURS PRN
Qty: 90 TABLET | Refills: 0 | Status: CANCELLED | OUTPATIENT
Start: 2023-05-09 | End: 2023-06-08

## 2023-03-08 RX ORDER — HYDROCODONE BITARTRATE AND ACETAMINOPHEN 10; 325 MG/1; MG/1
1 TABLET ORAL EVERY 8 HOURS PRN
Qty: 90 TABLET | Refills: 0 | Status: SHIPPED | OUTPATIENT
Start: 2023-04-09 | End: 2023-05-09

## 2023-03-08 NOTE — TELEPHONE ENCOUNTER
----- Message from Patricia Alston sent at 3/8/2023 11:06 AM CST -----  Patient called and stated that she would like to have her pain medicine called into [facility]'s on [facility] if any questions please give her a call at [phone]

## 2023-03-09 NOTE — ADDENDUM NOTE
Addendum  created 03/09/23 0902 by Billy Westfall MD    Clinical Note Signed, Intraprocedure Blocks edited

## 2023-03-13 ENCOUNTER — ANESTHESIA EVENT (OUTPATIENT)
Dept: ANESTHESIOLOGY | Facility: HOSPITAL | Age: 68
End: 2023-03-13

## 2023-03-13 ENCOUNTER — ANESTHESIA (OUTPATIENT)
Dept: ANESTHESIOLOGY | Facility: HOSPITAL | Age: 68
End: 2023-03-13
Payer: MEDICARE

## 2023-03-13 ENCOUNTER — ANESTHESIA (OUTPATIENT)
Dept: ANESTHESIOLOGY | Facility: HOSPITAL | Age: 68
End: 2023-03-13

## 2023-03-13 PROCEDURE — 36620 INSERTION CATHETER ARTERY: CPT | Mod: ,,, | Performed by: ANESTHESIOLOGY

## 2023-03-13 PROCEDURE — 36620 ARTERIAL: ICD-10-PCS | Mod: ,,, | Performed by: ANESTHESIOLOGY

## 2023-03-13 NOTE — ANESTHESIA PROCEDURE NOTES
Arterial    Diagnosis: Hypotension    Patient location during procedure: ICU  Procedure start time: 12/24/2022 8:15 AM  Timeout: 12/24/2022 8:15 AM  Procedure end time: 12/24/2022 8:20 AM    Staffing  Authorizing Provider: Billy Westfall MD  Performing Provider: Billy Westfall MD    Anesthesiologist was present at the time of the procedure.    Preanesthetic Checklist  Completed: patient identified, IV checked, site marked, risks and benefits discussed, surgical consent, monitors and equipment checked, pre-op evaluation, timeout performed and anesthesia consent givenArterial  Skin Prep: chlorhexidine gluconate  Orientation: left  Location: radial    Catheter Size: 20 G  Catheter placement by Ultrasound guidance. Heme positive aspiration all ports.   Vessel Caliber: medium, patent, compressibility normal  Vascular Doppler:  not done  Needle advanced into vessel with real time Ultrasound guidance.Insertion Attempts: 1  Assessment  Dressing: sutured in place and taped  Patient: Tolerated well  Additional Notes    Called to replace arterial line with extremely dampened waveform despite easily flushing and drawing back blood.  Informed consent obtained by me from patient to replace arterial catheter over guidewire.  Original arterial catheter and surrounding area thoroughly prepped with ChloraPrep.  Using sterile technique, catheter exchanged easily over guidewire with a new 3 in 20 gauge catheter.  This catheter was not sutured since patient is very tender in her hand and wrist and did not want to be stuck again.  Good waveform.

## 2023-03-13 NOTE — ADDENDUM NOTE
Addendum  created 03/13/23 1251 by Billy Westfall MD    Delete clinical note, Intraprocedure Blocks edited, Order Canceled from Note

## 2023-03-15 RX ORDER — BETAMETHASONE DIPROPIONATE 0.5 MG/G
CREAM TOPICAL 2 TIMES DAILY
Qty: 45 G | Refills: 2 | Status: SHIPPED | OUTPATIENT
Start: 2023-03-15

## 2023-03-15 NOTE — TELEPHONE ENCOUNTER
----- Message from Emily Uribe sent at 3/15/2023  2:34 PM CDT -----  Refill on: betamethasone dipropionate    [id number] - [facility] [address]    [phone]

## 2023-03-15 NOTE — TELEPHONE ENCOUNTER
Pt is needing a refill on her betamethasone. Last office visit 02/08/2023. Next office visit 05/08/2023.

## 2023-03-18 ENCOUNTER — TELEPHONE (OUTPATIENT)
Dept: FAMILY MEDICINE | Facility: CLINIC | Age: 68
End: 2023-03-18

## 2023-03-18 DIAGNOSIS — F33.0 MILD EPISODE OF RECURRENT MAJOR DEPRESSIVE DISORDER: ICD-10-CM

## 2023-03-18 RX ORDER — BUPROPION HYDROCHLORIDE 150 MG/1
150 TABLET, EXTENDED RELEASE ORAL 2 TIMES DAILY
Qty: 180 TABLET | Refills: 1 | Status: CANCELLED | OUTPATIENT
Start: 2023-03-18 | End: 2024-03-17

## 2023-04-10 ENCOUNTER — TELEPHONE (OUTPATIENT)
Dept: FAMILY MEDICINE | Facility: CLINIC | Age: 68
End: 2023-04-10

## 2023-04-10 NOTE — TELEPHONE ENCOUNTER
----- Message from Roselyn Abarca sent at 4/10/2023 10:27 AM CDT -----  Refill for hydrocodone. [facility] on [address]. Pt #[phone]

## 2023-04-11 ENCOUNTER — PATIENT MESSAGE (OUTPATIENT)
Dept: ADMINISTRATIVE | Facility: HOSPITAL | Age: 68
End: 2023-04-11
Payer: MEDICARE

## 2023-04-21 ENCOUNTER — TELEPHONE (OUTPATIENT)
Dept: FAMILY MEDICINE | Facility: CLINIC | Age: 68
End: 2023-04-21

## 2023-05-04 LAB
ALBUMIN SERPL-MCNC: 4.1 G/DL (ref 3.6–5.1)
ALBUMIN/CREAT UR: 9 MCG/MG CREAT
ALBUMIN/GLOB SERPL: 1.6 (CALC) (ref 1–2.5)
ALP SERPL-CCNC: 87 U/L (ref 37–153)
ALT SERPL-CCNC: 3 U/L (ref 6–29)
APPEARANCE UR: CLEAR
AST SERPL-CCNC: 8 U/L (ref 10–35)
BACTERIA #/AREA URNS HPF: ABNORMAL /HPF
BACTERIA UR CULT: ABNORMAL
BASOPHILS # BLD AUTO: 39 CELLS/UL (ref 0–200)
BASOPHILS NFR BLD AUTO: 0.6 %
BILIRUB SERPL-MCNC: 0.5 MG/DL (ref 0.2–1.2)
BILIRUB UR QL STRIP: NEGATIVE
BUN SERPL-MCNC: 24 MG/DL (ref 7–25)
BUN/CREAT SERPL: 10 (CALC) (ref 6–22)
CALCIUM SERPL-MCNC: 9.1 MG/DL (ref 8.6–10.4)
CHLORIDE SERPL-SCNC: 103 MMOL/L (ref 98–110)
CHOLEST SERPL-MCNC: 148 MG/DL
CHOLEST/HDLC SERPL: 2.8 (CALC)
CO2 SERPL-SCNC: 26 MMOL/L (ref 20–32)
COLOR UR: YELLOW
CREAT SERPL-MCNC: 2.49 MG/DL (ref 0.5–1.05)
CREAT UR-MCNC: 44 MG/DL (ref 20–275)
EGFR: 20 ML/MIN/1.73M2
EOSINOPHIL # BLD AUTO: 299 CELLS/UL (ref 15–500)
EOSINOPHIL NFR BLD AUTO: 4.6 %
ERYTHROCYTE [DISTWIDTH] IN BLOOD BY AUTOMATED COUNT: 14 % (ref 11–15)
GLOBULIN SER CALC-MCNC: 2.6 G/DL (CALC) (ref 1.9–3.7)
GLUCOSE SERPL-MCNC: 71 MG/DL (ref 65–99)
GLUCOSE UR QL STRIP: NEGATIVE
HCT VFR BLD AUTO: 37.7 % (ref 35–45)
HDLC SERPL-MCNC: 53 MG/DL
HGB BLD-MCNC: 11.7 G/DL (ref 11.7–15.5)
HGB UR QL STRIP: NEGATIVE
HYALINE CASTS #/AREA URNS LPF: ABNORMAL /LPF
KETONES UR QL STRIP: NEGATIVE
LDLC SERPL CALC-MCNC: 79 MG/DL (CALC)
LEUKOCYTE ESTERASE UR QL STRIP: NEGATIVE
LYMPHOCYTES # BLD AUTO: 858 CELLS/UL (ref 850–3900)
LYMPHOCYTES NFR BLD AUTO: 13.2 %
MCH RBC QN AUTO: 29.9 PG (ref 27–33)
MCHC RBC AUTO-ENTMCNC: 31 G/DL (ref 32–36)
MCV RBC AUTO: 96.4 FL (ref 80–100)
MICROALBUMIN UR-MCNC: 0.4 MG/DL
MONOCYTES # BLD AUTO: 494 CELLS/UL (ref 200–950)
MONOCYTES NFR BLD AUTO: 7.6 %
NEUTROPHILS # BLD AUTO: 4810 CELLS/UL (ref 1500–7800)
NEUTROPHILS NFR BLD AUTO: 74 %
NITRITE UR QL STRIP: NEGATIVE
NONHDLC SERPL-MCNC: 95 MG/DL (CALC)
PH UR STRIP: 6.5 [PH] (ref 5–8)
PLATELET # BLD AUTO: 260 THOUSAND/UL (ref 140–400)
PMV BLD REES-ECKER: 9.5 FL (ref 7.5–12.5)
POTASSIUM SERPL-SCNC: 4.7 MMOL/L (ref 3.5–5.3)
PROT SERPL-MCNC: 6.7 G/DL (ref 6.1–8.1)
PROT UR QL STRIP: NEGATIVE
RBC # BLD AUTO: 3.91 MILLION/UL (ref 3.8–5.1)
RBC #/AREA URNS HPF: ABNORMAL /HPF
SERVICE CMNT-IMP: ABNORMAL
SODIUM SERPL-SCNC: 138 MMOL/L (ref 135–146)
SP GR UR STRIP: 1.01 (ref 1–1.03)
SQUAMOUS #/AREA URNS HPF: ABNORMAL /HPF
TRIGL SERPL-MCNC: 78 MG/DL
TSH SERPL-ACNC: 1.68 MIU/L (ref 0.4–4.5)
WBC # BLD AUTO: 6.5 THOUSAND/UL (ref 3.8–10.8)
WBC #/AREA URNS HPF: ABNORMAL /HPF

## 2023-05-05 ENCOUNTER — TELEPHONE (OUTPATIENT)
Dept: FAMILY MEDICINE | Facility: CLINIC | Age: 68
End: 2023-05-05

## 2023-05-08 ENCOUNTER — OFFICE VISIT (OUTPATIENT)
Dept: FAMILY MEDICINE | Facility: CLINIC | Age: 68
End: 2023-05-08
Payer: MEDICARE

## 2023-05-08 ENCOUNTER — TELEPHONE (OUTPATIENT)
Dept: FAMILY MEDICINE | Facility: CLINIC | Age: 68
End: 2023-05-08

## 2023-05-08 VITALS
SYSTOLIC BLOOD PRESSURE: 128 MMHG | HEART RATE: 74 BPM | WEIGHT: 239.63 LBS | HEIGHT: 62 IN | OXYGEN SATURATION: 97 % | BODY MASS INDEX: 44.1 KG/M2 | DIASTOLIC BLOOD PRESSURE: 80 MMHG

## 2023-05-08 DIAGNOSIS — N17.9 AKI (ACUTE KIDNEY INJURY): Primary | ICD-10-CM

## 2023-05-08 DIAGNOSIS — I10 ESSENTIAL HYPERTENSION: ICD-10-CM

## 2023-05-08 DIAGNOSIS — M25.561 RIGHT KNEE PAIN, UNSPECIFIED CHRONICITY: ICD-10-CM

## 2023-05-08 DIAGNOSIS — J44.9 CHRONIC OBSTRUCTIVE PULMONARY DISEASE, UNSPECIFIED COPD TYPE: ICD-10-CM

## 2023-05-08 DIAGNOSIS — M79.7 FIBROMYALGIA: ICD-10-CM

## 2023-05-08 DIAGNOSIS — F33.0 MILD EPISODE OF RECURRENT MAJOR DEPRESSIVE DISORDER: ICD-10-CM

## 2023-05-08 DIAGNOSIS — Z51.81 ENCOUNTER FOR THERAPEUTIC DRUG MONITORING: ICD-10-CM

## 2023-05-08 DIAGNOSIS — M17.0 PRIMARY OSTEOARTHRITIS OF BOTH KNEES: ICD-10-CM

## 2023-05-08 DIAGNOSIS — I50.20 HFREF (HEART FAILURE WITH REDUCED EJECTION FRACTION): ICD-10-CM

## 2023-05-08 DIAGNOSIS — Z79.899 ENCOUNTER FOR LONG-TERM (CURRENT) USE OF OTHER MEDICATIONS: ICD-10-CM

## 2023-05-08 DIAGNOSIS — E78.5 DYSLIPIDEMIA: ICD-10-CM

## 2023-05-08 PROCEDURE — 1101F PT FALLS ASSESS-DOCD LE1/YR: CPT | Mod: CPTII,S$GLB,, | Performed by: NURSE PRACTITIONER

## 2023-05-08 PROCEDURE — 3079F DIAST BP 80-89 MM HG: CPT | Mod: CPTII,S$GLB,, | Performed by: NURSE PRACTITIONER

## 2023-05-08 PROCEDURE — 99214 OFFICE O/P EST MOD 30 MIN: CPT | Mod: S$GLB,,, | Performed by: NURSE PRACTITIONER

## 2023-05-08 PROCEDURE — 4010F ACE/ARB THERAPY RXD/TAKEN: CPT | Mod: CPTII,S$GLB,, | Performed by: NURSE PRACTITIONER

## 2023-05-08 PROCEDURE — 1159F PR MEDICATION LIST DOCUMENTED IN MEDICAL RECORD: ICD-10-PCS | Mod: CPTII,S$GLB,, | Performed by: NURSE PRACTITIONER

## 2023-05-08 PROCEDURE — 4010F PR ACE/ARB THEARPY RXD/TAKEN: ICD-10-PCS | Mod: CPTII,S$GLB,, | Performed by: NURSE PRACTITIONER

## 2023-05-08 PROCEDURE — 3061F NEG MICROALBUMINURIA REV: CPT | Mod: CPTII,S$GLB,, | Performed by: NURSE PRACTITIONER

## 2023-05-08 PROCEDURE — 99214 PR OFFICE/OUTPT VISIT, EST, LEVL IV, 30-39 MIN: ICD-10-PCS | Mod: S$GLB,,, | Performed by: NURSE PRACTITIONER

## 2023-05-08 PROCEDURE — 1160F PR REVIEW ALL MEDS BY PRESCRIBER/CLIN PHARMACIST DOCUMENTED: ICD-10-PCS | Mod: CPTII,S$GLB,, | Performed by: NURSE PRACTITIONER

## 2023-05-08 PROCEDURE — 3008F PR BODY MASS INDEX (BMI) DOCUMENTED: ICD-10-PCS | Mod: CPTII,S$GLB,, | Performed by: NURSE PRACTITIONER

## 2023-05-08 PROCEDURE — 3066F NEPHROPATHY DOC TX: CPT | Mod: CPTII,S$GLB,, | Performed by: NURSE PRACTITIONER

## 2023-05-08 PROCEDURE — 3074F SYST BP LT 130 MM HG: CPT | Mod: CPTII,S$GLB,, | Performed by: NURSE PRACTITIONER

## 2023-05-08 PROCEDURE — 1159F MED LIST DOCD IN RCRD: CPT | Mod: CPTII,S$GLB,, | Performed by: NURSE PRACTITIONER

## 2023-05-08 PROCEDURE — 1160F RVW MEDS BY RX/DR IN RCRD: CPT | Mod: CPTII,S$GLB,, | Performed by: NURSE PRACTITIONER

## 2023-05-08 PROCEDURE — 3008F BODY MASS INDEX DOCD: CPT | Mod: CPTII,S$GLB,, | Performed by: NURSE PRACTITIONER

## 2023-05-08 PROCEDURE — 3288F PR FALLS RISK ASSESSMENT DOCUMENTED: ICD-10-PCS | Mod: CPTII,S$GLB,, | Performed by: NURSE PRACTITIONER

## 2023-05-08 PROCEDURE — 3061F PR NEG MICROALBUMINURIA RESULT DOCUMENTED/REVIEW: ICD-10-PCS | Mod: CPTII,S$GLB,, | Performed by: NURSE PRACTITIONER

## 2023-05-08 PROCEDURE — 3079F PR MOST RECENT DIASTOLIC BLOOD PRESSURE 80-89 MM HG: ICD-10-PCS | Mod: CPTII,S$GLB,, | Performed by: NURSE PRACTITIONER

## 2023-05-08 PROCEDURE — 3288F FALL RISK ASSESSMENT DOCD: CPT | Mod: CPTII,S$GLB,, | Performed by: NURSE PRACTITIONER

## 2023-05-08 PROCEDURE — 3066F PR DOCUMENTATION OF TREATMENT FOR NEPHROPATHY: ICD-10-PCS | Mod: CPTII,S$GLB,, | Performed by: NURSE PRACTITIONER

## 2023-05-08 PROCEDURE — 3074F PR MOST RECENT SYSTOLIC BLOOD PRESSURE < 130 MM HG: ICD-10-PCS | Mod: CPTII,S$GLB,, | Performed by: NURSE PRACTITIONER

## 2023-05-08 PROCEDURE — 1101F PR PT FALLS ASSESS DOC 0-1 FALLS W/OUT INJ PAST YR: ICD-10-PCS | Mod: CPTII,S$GLB,, | Performed by: NURSE PRACTITIONER

## 2023-05-08 RX ORDER — ALBUTEROL SULFATE 90 UG/1
2 AEROSOL, METERED RESPIRATORY (INHALATION) EVERY 6 HOURS PRN
Qty: 18 G | Refills: 2 | Status: SHIPPED | OUTPATIENT
Start: 2023-05-08 | End: 2024-02-22

## 2023-05-08 RX ORDER — FLUOXETINE HYDROCHLORIDE 40 MG/1
40 CAPSULE ORAL DAILY
Qty: 90 CAPSULE | Refills: 3 | Status: SHIPPED | OUTPATIENT
Start: 2023-05-08

## 2023-05-08 RX ORDER — LISINOPRIL 10 MG/1
10 TABLET ORAL DAILY
Qty: 90 TABLET | Refills: 3 | Status: SHIPPED | OUTPATIENT
Start: 2023-05-08 | End: 2023-08-09

## 2023-05-08 RX ORDER — ATORVASTATIN CALCIUM 10 MG/1
10 TABLET, FILM COATED ORAL NIGHTLY
Qty: 90 TABLET | Refills: 3 | Status: SHIPPED | OUTPATIENT
Start: 2023-05-08

## 2023-05-08 RX ORDER — HYDROCODONE BITARTRATE AND ACETAMINOPHEN 10; 325 MG/1; MG/1
1 TABLET ORAL EVERY 8 HOURS PRN
Qty: 90 TABLET | Refills: 0 | Status: SHIPPED | OUTPATIENT
Start: 2023-05-09 | End: 2023-07-12

## 2023-05-08 RX ORDER — HYDROCODONE BITARTRATE AND ACETAMINOPHEN 10; 325 MG/1; MG/1
1 TABLET ORAL EVERY 8 HOURS PRN
Qty: 90 TABLET | Refills: 0 | Status: SHIPPED | OUTPATIENT
Start: 2023-06-09 | End: 2023-07-12

## 2023-05-08 RX ORDER — HYDROCODONE BITARTRATE AND ACETAMINOPHEN 10; 325 MG/1; MG/1
1 TABLET ORAL EVERY 8 HOURS PRN
Qty: 90 TABLET | Refills: 0 | Status: CANCELLED | OUTPATIENT
Start: 2023-05-08

## 2023-05-08 RX ORDER — HYDROCODONE BITARTRATE AND ACETAMINOPHEN 10; 325 MG/1; MG/1
1 TABLET ORAL EVERY 8 HOURS PRN
Qty: 90 TABLET | Refills: 0 | Status: SHIPPED | OUTPATIENT
Start: 2023-07-09 | End: 2023-08-09 | Stop reason: SDUPTHER

## 2023-05-08 NOTE — TELEPHONE ENCOUNTER
----- Message from Roselyn Abarca sent at 5/8/2023  2:52 PM CDT -----  Pt states that she took immodium two weeks ago. She doesn't know if that had an affect for her blood work. JO-ANN. Pt #[phone]

## 2023-05-08 NOTE — PROGRESS NOTES
SUBJECTIVE:    Patient ID: Iris Mueller is a 67 y.o. female.    Chief Complaint: Follow-up (Meds verbally confirmed/c-scope kit at home/ dex and mammo ordered//dp)    Pt here for regular f/u- HTN/COPD/OA. Pt here with her  today    Pt reports overall she's been feeling pretty good lately. No new c/o's    Recent labs show decline in renal fxn with CR 2.49 and GFR down to 20 from 40 in December- pt denies any recent NSAID use. Denies n/v, change in baseline SOB or leg edema. Reports checks her BP occasionally at home and it's usually in the 130 range    Has been cutting back on what she's eating the past 3-6 months and weight is down 29lbs since Nov!    Reports since last visit had to cancel her f/u visit with Dr. Tran so needs to call and reschedule- she had hospital stay in Dec and underwent angio with showed mild LAD/RCA disease, echo showed EF 40% with anterior apical segment hypo to akinetic, mod MR, mod AS    Reports hasn't had any further abd pain- hospital stay in Dec d/t sepsis from suspected diverticulitis    Reports smoking 2-5 cigs/day.    Continues with chronic knee pain, denies any recent falls.    Reports has cologuard kit at home she still needs to collect        Office Visit on 02/08/2023   Component Date Value Ref Range Status    WBC 05/03/2023 6.5  3.8 - 10.8 Thousand/uL Final    RBC 05/03/2023 3.91  3.80 - 5.10 Million/uL Final    Hemoglobin 05/03/2023 11.7  11.7 - 15.5 g/dL Final    Hematocrit 05/03/2023 37.7  35.0 - 45.0 % Final    MCV 05/03/2023 96.4  80.0 - 100.0 fL Final    MCH 05/03/2023 29.9  27.0 - 33.0 pg Final    MCHC 05/03/2023 31.0 (L)  32.0 - 36.0 g/dL Final    RDW 05/03/2023 14.0  11.0 - 15.0 % Final    Platelets 05/03/2023 260  140 - 400 Thousand/uL Final    MPV 05/03/2023 9.5  7.5 - 12.5 fL Final    Neutrophils, Abs 05/03/2023 4,810  1,500 - 7,800 cells/uL Final    Lymph # 05/03/2023 858  850 - 3,900 cells/uL Final    Mono # 05/03/2023 494  200 - 950 cells/uL  Final    Eos # 05/03/2023 299  15 - 500 cells/uL Final    Baso # 05/03/2023 39  0 - 200 cells/uL Final    Neutrophils Relative 05/03/2023 74  % Final    Lymph % 05/03/2023 13.2  % Final    Mono % 05/03/2023 7.6  % Final    Eosinophil % 05/03/2023 4.6  % Final    Basophil % 05/03/2023 0.6  % Final    Glucose 05/03/2023 71  65 - 99 mg/dL Final    BUN 05/03/2023 24  7 - 25 mg/dL Final    Creatinine 05/03/2023 2.49 (H)  0.50 - 1.05 mg/dL Final    eGFR 05/03/2023 20 (L)  > OR = 60 mL/min/1.73m2 Final    BUN/Creatinine Ratio 05/03/2023 10  6 - 22 (calc) Final    Sodium 05/03/2023 138  135 - 146 mmol/L Final    Potassium 05/03/2023 4.7  3.5 - 5.3 mmol/L Final    Chloride 05/03/2023 103  98 - 110 mmol/L Final    CO2 05/03/2023 26  20 - 32 mmol/L Final    Calcium 05/03/2023 9.1  8.6 - 10.4 mg/dL Final    Total Protein 05/03/2023 6.7  6.1 - 8.1 g/dL Final    Albumin 05/03/2023 4.1  3.6 - 5.1 g/dL Final    Globulin, Total 05/03/2023 2.6  1.9 - 3.7 g/dL (calc) Final    Albumin/Globulin Ratio 05/03/2023 1.6  1.0 - 2.5 (calc) Final    Total Bilirubin 05/03/2023 0.5  0.2 - 1.2 mg/dL Final    Alkaline Phosphatase 05/03/2023 87  37 - 153 U/L Final    AST 05/03/2023 8 (L)  10 - 35 U/L Final    ALT 05/03/2023 3 (L)  6 - 29 U/L Final    TSH w/reflex to FT4 05/03/2023 1.68  0.40 - 4.50 mIU/L Final    Color, UA 05/03/2023 YELLOW  YELLOW Final    Appearance, UA 05/03/2023 CLEAR  CLEAR Final    Specific Gravity, UA 05/03/2023 1.008  1.001 - 1.035 Final    pH, UA 05/03/2023 6.5  5.0 - 8.0 Final    Glucose, UA 05/03/2023 NEGATIVE  NEGATIVE Final    Bilirubin, UA 05/03/2023 NEGATIVE  NEGATIVE Final    Ketones, UA 05/03/2023 NEGATIVE  NEGATIVE Final    Occult Blood UA 05/03/2023 NEGATIVE  NEGATIVE Final    Protein, UA 05/03/2023 NEGATIVE  NEGATIVE Final    Nitrite, UA 05/03/2023 NEGATIVE  NEGATIVE Final    Leukocytes, UA 05/03/2023 NEGATIVE  NEGATIVE Final    WBC Casts, UA 05/03/2023 NONE SEEN  < OR = 5 /HPF Final    RBC Casts, UA 05/03/2023  NONE SEEN  < OR = 2 /HPF Final    Squam Epithel, UA 05/03/2023 NONE SEEN  < OR = 5 /HPF Final    Bacteria, UA 05/03/2023 FEW (A)  NONE SEEN /HPF Final    Hyaline Casts, UA 05/03/2023 NONE SEEN  NONE SEEN /LPF Final    Service Cmt: 05/03/2023    Final    Reflexive Urine Culture 05/03/2023    Final    Creatinine, Urine 05/03/2023 44  20 - 275 mg/dL Final    Microalb, Ur 05/03/2023 0.4  See Note: mg/dL Final    Microalb/Creat Ratio 05/03/2023 9  <30 mcg/mg creat Final    Cholesterol 05/03/2023 148  <200 mg/dL Final    HDL 05/03/2023 53  > OR = 50 mg/dL Final    Triglycerides 05/03/2023 78  <150 mg/dL Final    LDL Cholesterol 05/03/2023 79  mg/dL (calc) Final    HDL/Cholesterol Ratio 05/03/2023 2.8  <5.0 (calc) Final    Non HDL Chol. (LDL+VLDL) 05/03/2023 95  <130 mg/dL (calc) Final   No results displayed because visit has over 200 results.      Admission on 12/22/2022, Discharged on 12/23/2022   Component Date Value Ref Range Status    WBC 12/22/2022 13.13 (H)  3.90 - 12.70 K/uL Final    RBC 12/22/2022 4.92  4.00 - 5.40 M/uL Final    Hemoglobin 12/22/2022 15.4  12.0 - 16.0 g/dL Final    Hematocrit 12/22/2022 45.8  37.0 - 48.5 % Final    MCV 12/22/2022 93  82 - 98 fL Final    MCH 12/22/2022 31.3 (H)  27.0 - 31.0 pg Final    MCHC 12/22/2022 33.6  32.0 - 36.0 g/dL Final    RDW 12/22/2022 14.3  11.5 - 14.5 % Final    Platelets 12/22/2022 338  150 - 450 K/uL Final    MPV 12/22/2022 9.3  9.2 - 12.9 fL Final    Immature Granulocytes 12/22/2022 0.6 (H)  0.0 - 0.5 % Final    Gran # (ANC) 12/22/2022 11.3 (H)  1.8 - 7.7 K/uL Final    Immature Grans (Abs) 12/22/2022 0.08 (H)  0.00 - 0.04 K/uL Final    Lymph # 12/22/2022 0.8 (L)  1.0 - 4.8 K/uL Final    Mono # 12/22/2022 0.9  0.3 - 1.0 K/uL Final    Eos # 12/22/2022 0.0  0.0 - 0.5 K/uL Final    Baso # 12/22/2022 0.02  0.00 - 0.20 K/uL Final    nRBC 12/22/2022 0  0 /100 WBC Final    Gran % 12/22/2022 86.0 (H)  38.0 - 73.0 % Final    Lymph % 12/22/2022 6.2 (L)  18.0 - 48.0 % Final     Mono % 12/22/2022 6.9  4.0 - 15.0 % Final    Eosinophil % 12/22/2022 0.1  0.0 - 8.0 % Final    Basophil % 12/22/2022 0.2  0.0 - 1.9 % Final    Differential Method 12/22/2022 Automated   Final    Sodium 12/22/2022 126 (L)  136 - 145 mmol/L Final    Potassium 12/22/2022 3.8  3.5 - 5.1 mmol/L Final    Chloride 12/22/2022 89 (L)  95 - 110 mmol/L Final    CO2 12/22/2022 22 (L)  23 - 29 mmol/L Final    Glucose 12/22/2022 110  70 - 110 mg/dL Final    BUN 12/22/2022 11  8 - 23 mg/dL Final    Creatinine 12/22/2022 1.1  0.5 - 1.4 mg/dL Final    Calcium 12/22/2022 9.9  8.7 - 10.5 mg/dL Final    Total Protein 12/22/2022 8.2  6.0 - 8.4 g/dL Final    Albumin 12/22/2022 4.4  3.5 - 5.2 g/dL Final    Total Bilirubin 12/22/2022 1.3 (H)  0.1 - 1.0 mg/dL Final    Alkaline Phosphatase 12/22/2022 115  55 - 135 U/L Final    AST 12/22/2022 22  10 - 40 U/L Final    ALT 12/22/2022 13  10 - 44 U/L Final    Anion Gap 12/22/2022 15  8 - 16 mmol/L Final    eGFR 12/22/2022 54 (A)  >60 mL/min/1.73 m^2 Final    Lipase 12/22/2022 6  4 - 60 U/L Final    Troponin I 12/22/2022 0.730 (H)  0.000 - 0.026 ng/mL Final    Specimen UA 12/22/2022 Urine, Clean Catch   Final    Color, UA 12/22/2022 Yellow  Yellow, Straw, Snoal Final    Appearance, UA 12/22/2022 Clear  Clear Final    pH, UA 12/22/2022 8.0  5.0 - 8.0 Final    Specific Gravity, UA 12/22/2022 1.010  1.005 - 1.030 Final    Protein, UA 12/22/2022 2+ (A)  Negative Final    Glucose, UA 12/22/2022 Negative  Negative Final    Ketones, UA 12/22/2022 Negative  Negative Final    Bilirubin (UA) 12/22/2022 Negative  Negative Final    Occult Blood UA 12/22/2022 1+ (A)  Negative Final    Nitrite, UA 12/22/2022 Negative  Negative Final    Urobilinogen, UA 12/22/2022 Negative  <2.0 EU/dL Final    Leukocytes, UA 12/22/2022 Negative  Negative Final    Osmolality 12/22/2022 273 (L)  275 - 295 mOsm/kg Final    BSA 12/23/2022 2.23  m2 Final    TDI SEPTAL 12/23/2022 0.06  m/s Final    LV LATERAL E/E' RATIO 12/23/2022  "21.80  m/s Final    LV SEPTAL E/E' RATIO 12/23/2022 18.17  m/s Final    Left Ventricular Outflow Tract Shraddha* 12/23/2022 1.03  cm/s Final    Left Ventricular Outflow Tract Shraddha* 12/23/2022 4.94  mmHg Final    TV mean gradient 12/23/2022 17  mmHg Final    Pulmonary Valve Mean Velocity 12/23/2022 0.68  m/s Final    AORTIC VALVE CUSP SEPERATION 12/23/2022 1.51  cm Final    TDI LATERAL 12/23/2022 0.05  m/s Final    PV PEAK VELOCITY 12/23/2022 1.03  cm/s Final    LVIDd 12/23/2022 4.84  3.5 - 6.0 cm Final    IVS 12/23/2022 1.03  0.6 - 1.1 cm Final    Posterior Wall 12/23/2022 1.02  0.6 - 1.1 cm Final    Ao root annulus 12/23/2022 2.84  cm Final    LVIDs 12/23/2022 3.89  2.1 - 4.0 cm Final    FS 12/23/2022 20  28 - 44 % Final    Sinus 12/23/2022 2.71  cm Final    STJ 12/23/2022 2.30  cm Final    Ascending aorta 12/23/2022 2.70  cm Final    LV mass 12/23/2022 178.40  g Final    RVDD 12/23/2022 2.45  cm Final    TAPSE 12/23/2022 2.97  cm Final    Right ventricular length in diasto* 12/23/2022 4.13  cm Final    RV S' 12/23/2022 0.01  cm/s Final    Left Ventricle Relative Wall Thick* 12/23/2022 0.42  cm Final    AV regurgitation pressure 1/2 time 12/23/2022 457.693949400714019  ms Final    AV mean gradient 12/23/2022 15  mmHg Final    AV valve area 12/23/2022 1.55  cm2 Final    AV Velocity Ratio 12/23/2022 0.51   Final    AV index (prosthetic) 12/23/2022 0.50   Final    MV mean gradient 12/23/2022 3  mmHg Final    MV valve area p 1/2 method 12/23/2022 6.57  cm2 Final    MV valve area by continuity eq 12/23/2022 3.61  cm2 Final    E/A ratio 12/23/2022 1.11   Final    Mean e' 12/23/2022 0.06  m/s Final    E wave deceleration time 12/23/2022 137.91  msec Final    IVRT 12/23/2022 117.98  msec Final    MV "A" wave duration 12/23/2022 144.377327686315661  msec Final    Pulm vein S/D ratio 12/23/2022 1.31   Final    LVOT diameter 12/23/2022 1.98  cm Final    LVOT area 12/23/2022 3.1  cm2 Final    LVOT peak ayla 12/23/2022 1.46  m/s Final "    LVOT peak VTI 12/23/2022 26.30  cm Final    Ao peak tenzin 12/23/2022 2.88  m/s Final    Ao VTI 12/23/2022 52.3  cm Final    Mr max tenzin 12/23/2022 7.19  m/s Final    LVOT stroke volume 12/23/2022 80.94  cm3 Final    AV peak gradient 12/23/2022 33  mmHg Final    MV peak gradient 12/23/2022 8  mmHg Final    E/E' ratio 12/23/2022 19.82  m/s Final    MV Peak E Tenzin 12/23/2022 1.09  m/s Final    AR Max Tenzin 12/23/2022 4.94  m/s Final    TR Max Tenzin 12/23/2022 2.82  m/s Final    MV VTI 12/23/2022 22.4  cm Final    MV stenosis pressure 1/2 time 12/23/2022 33.47  ms Final    MV Peak A Tenzin 12/23/2022 0.98  m/s Final    PV Peak S Tenzin 12/23/2022 0.67  m/s Final    PV Peak D Tenzin 12/23/2022 0.51  m/s Final    LV Systolic Volume 12/23/2022 65.58  mL Final    LV Systolic Volume Index 12/23/2022 31.1  mL/m2 Final    LV Diastolic Volume 12/23/2022 109.59  mL Final    LV Diastolic Volume Index 12/23/2022 51.94  mL/m2 Final    LV Mass Index 12/23/2022 85  g/m2 Final    Right Atrium Volume Systolic 12/23/2022 15.26  mL Final    RA Major Axis 12/23/2022 4.32  cm Final    Left Atrium Minor Axis 12/23/2022 6.87  cm Final    Left Atrium Major Axis 12/23/2022 4.59  cm Final    Triscuspid Valve Regurgitation Pea* 12/23/2022 32  mmHg Final    LA Volume Index (Mod) 12/23/2022 67.1  mL/m2 Final    LA volume (mod) 12/23/2022 141.68  cm3 Final    RA Width 12/23/2022 2.90  cm Final    Right Atrial Pressure (from IVC) 12/23/2022 15  mmHg Final    EF 12/23/2022 40  % Final    TV rest pulmonary artery pressure 12/23/2022 47  mmHg Final    RBC, UA 12/22/2022 5 (H)  0 - 4 /hpf Final    WBC, UA 12/22/2022 2  0 - 5 /hpf Final    Bacteria 12/22/2022 Rare  None-Occ /hpf Final    Squam Epithel, UA 12/22/2022 7  /hpf Final    Hyaline Casts, UA 12/22/2022 0  0-1/lpf /lpf Final    Amorphous, UA 12/22/2022 Few  None-Moderate Final    Microscopic Comment 12/22/2022 SEE COMMENT   Final    Sodium 12/22/2022 129 (L)  136 - 145 mmol/L Final    Potassium 12/22/2022 3.6   3.5 - 5.1 mmol/L Final    Chloride 12/22/2022 96  95 - 110 mmol/L Final    CO2 12/22/2022 21 (L)  23 - 29 mmol/L Final    Glucose 12/22/2022 111 (H)  70 - 110 mg/dL Final    BUN 12/22/2022 11  8 - 23 mg/dL Final    Creatinine 12/22/2022 1.0  0.5 - 1.4 mg/dL Final    Calcium 12/22/2022 9.2  8.7 - 10.5 mg/dL Final    Anion Gap 12/22/2022 12  8 - 16 mmol/L Final    eGFR 12/22/2022 >60  >60 mL/min/1.73 m^2 Final    Troponin I 12/22/2022 0.697 (H)  0.000 - 0.026 ng/mL Final    Sodium 12/23/2022 128 (L)  136 - 145 mmol/L Final    Potassium 12/23/2022 3.6  3.5 - 5.1 mmol/L Final    Chloride 12/23/2022 95  95 - 110 mmol/L Final    CO2 12/23/2022 21 (L)  23 - 29 mmol/L Final    Glucose 12/23/2022 108  70 - 110 mg/dL Final    BUN 12/23/2022 12  8 - 23 mg/dL Final    Creatinine 12/23/2022 1.0  0.5 - 1.4 mg/dL Final    Calcium 12/23/2022 9.1  8.7 - 10.5 mg/dL Final    Anion Gap 12/23/2022 12  8 - 16 mmol/L Final    eGFR 12/23/2022 >60  >60 mL/min/1.73 m^2 Final    WBC 12/23/2022 10.23  3.90 - 12.70 K/uL Final    RBC 12/23/2022 4.43  4.00 - 5.40 M/uL Final    Hemoglobin 12/23/2022 13.9  12.0 - 16.0 g/dL Final    Hematocrit 12/23/2022 41.9  37.0 - 48.5 % Final    MCV 12/23/2022 95  82 - 98 fL Final    MCH 12/23/2022 31.4 (H)  27.0 - 31.0 pg Final    MCHC 12/23/2022 33.2  32.0 - 36.0 g/dL Final    RDW 12/23/2022 14.3  11.5 - 14.5 % Final    Platelets 12/23/2022 294  150 - 450 K/uL Final    MPV 12/23/2022 9.2  9.2 - 12.9 fL Final    Immature Granulocytes 12/23/2022 0.5  0.0 - 0.5 % Final    Gran # (ANC) 12/23/2022 8.5 (H)  1.8 - 7.7 K/uL Final    Immature Grans (Abs) 12/23/2022 0.05 (H)  0.00 - 0.04 K/uL Final    Lymph # 12/23/2022 0.9 (L)  1.0 - 4.8 K/uL Final    Mono # 12/23/2022 0.8  0.3 - 1.0 K/uL Final    Eos # 12/23/2022 0.0  0.0 - 0.5 K/uL Final    Baso # 12/23/2022 0.02  0.00 - 0.20 K/uL Final    nRBC 12/23/2022 0  0 /100 WBC Final    Gran % 12/23/2022 82.9 (H)  38.0 - 73.0 % Final    Lymph % 12/23/2022 9.0 (L)  18.0 - 48.0  % Final    Mono % 2022 7.3  4.0 - 15.0 % Final    Eosinophil % 2022 0.1  0.0 - 8.0 % Final    Basophil % 2022 0.2  0.0 - 1.9 % Final    Differential Method 2022 Automated   Final    Troponin I 2022 0.648 (H)  0.000 - 0.026 ng/mL Final    TSH 2022 1.889  0.400 - 4.000 uIU/mL Final    Magnesium 2022 1.6  1.6 - 2.6 mg/dL Final    Sodium 2022 130 (L)  136 - 145 mmol/L Final    Potassium 2022 4.7  3.5 - 5.1 mmol/L Final    Chloride 2022 95  95 - 110 mmol/L Final    CO2 2022 24  23 - 29 mmol/L Final    Glucose 2022 105  70 - 110 mg/dL Final    BUN 2022 13  8 - 23 mg/dL Final    Creatinine 2022 1.1  0.5 - 1.4 mg/dL Final    Calcium 2022 9.4  8.7 - 10.5 mg/dL Final    Anion Gap 2022 11  8 - 16 mmol/L Final    eGFR 2022 54 (A)  >60 mL/min/1.73 m^2 Final    Troponin I 2022 0.523 (H)  0.000 - 0.026 ng/mL Final   Office Visit on 2022   Component Date Value Ref Range Status    Rapid Influenza A Ag 2022 Negative  Negative Final    Rapid Influenza B Ag 2022 Negative  Negative Final     Acceptable 2022 Yes   Final    SARS Coronavirus 2 Antigen 2022 Negative  Negative Final     Acceptable 2022 Yes   Final       Past Medical History:   Diagnosis Date    Arthritis     Asthma     COPD (chronic obstructive pulmonary disease)     Encounter for blood transfusion     Hypertension     Wound infection 2019    Right knee     Past Surgical History:   Procedure Laterality Date    ANGIOGRAM, CORONARY, WITH LEFT HEART CATHETERIZATION N/A 2022    Procedure: Angiogram, Coronary, with Left Heart Cath;  Surgeon: Jorge Tran MD;  Location: [facility] CATH/EP LAB;  Service: Cardiology;  Laterality: N/A;     SECTION      JOINT REPLACEMENT      Knee    KNEE ARTHROPLASTY Right 2019    Procedure: ARTHROPLASTY, KNEE;  Surgeon: Delio Chung MD;  Location: [facility];   Service: Orthopedics;  Laterality: Right;  anesthesia:  general and block    REPLACEMENT OF WOUND VACUUM-ASSISTED CLOSURE DEVICE Right 9/12/2019    Procedure: REPLACEMENT, WOUND VAC;  Surgeon: Delio Chung MD;  Location: [facility] OR;  Service: Orthopedics;  Laterality: Right;    TONSILLECTOMY      VENTRICULOGRAM, LEFT  12/23/2022    Procedure: Ventriculogram, Left;  Surgeon: Jorge Tran MD;  Location: [facility] CATH/EP LAB;  Service: Cardiology;;     Family History   Problem Relation Age of Onset    Alzheimer's disease Mother        The CVD Risk score (SUSU'Agostino, et al., 2008) failed to calculate for the following reasons:    The patient has a prior MI, stroke, CHF, or peripheral vascular disease diagnosis     Marital Status:   Alcohol History:  reports current alcohol use.  Tobacco History:  reports that she has been smoking cigarettes. She has a 7.50 pack-year smoking history. She has never used smokeless tobacco.  Drug History:  reports no history of drug use.    Health Maintenance Topics with due status: Not Due       Topic Last Completion Date    TETANUS VACCINE 09/18/2019    Hemoglobin A1c (Diabetic Prevention Screening) 12/23/2022    Lipid Panel 05/03/2023     Immunization History   Administered Date(s) Administered    COVID-19, MRNA, LN-S, PF (MODERNA FULL 0.5 ML DOSE) 02/26/2021, 03/26/2021, 12/14/2021    Influenza - High Dose - PF (65 years and older) 09/18/2019    Influenza - Quadrivalent - High Dose - PF (65 years and older) 08/20/2020, 10/25/2021, 11/08/2022    PPD Test 09/04/2019    Pneumococcal Conjugate - 13 Valent 03/06/2019    Pneumococcal Polysaccharide - 23 Valent 07/21/2020    Tdap 09/18/2019       Review of patient's allergies indicates:  No Known Allergies    Current Outpatient Medications:     ANORO ELLIPTA 62.5-25 mcg/actuation DsDv, Inhale 1 puff into the lungs once daily., Disp: 60 each, Rfl: 5    betamethasone dipropionate 0.05 % cream, Apply topically 2 (two) times daily., Disp:  45 g, Rfl: 2    buPROPion (WELLBUTRIN SR) 150 MG TBSR 12 hr tablet, Take 1 tablet (150 mg total) by mouth 2 (two) times daily., Disp: 180 tablet, Rfl: 1    ferrous sulfate (FEOSOL) 325 mg (65 mg iron) Tab tablet, Take 1 tablet (325 mg total) by mouth once daily., Disp: , Rfl: 0    gabapentin (NEURONTIN) 300 MG capsule, Take 1 capsule (300 mg total) by mouth 3 (three) times daily., Disp: 270 capsule, Rfl: 1    HYDROcodone-acetaminophen (NORCO)  mg per tablet, Take 1 tablet by mouth every 8 (eight) hours as needed for Pain., Disp: 90 tablet, Rfl: 0    loratadine (CLARITIN) 10 mg tablet, Take 10 mg by mouth once daily., Disp: , Rfl:     pantoprazole (PROTONIX) 40 MG tablet, Take 1 tablet (40 mg total) by mouth 2 (two) times daily., Disp: 60 tablet, Rfl: 0    sucralfate (CARAFATE) 100 mg/mL suspension, Take 10 mLs (1 g total) by mouth 4 (four) times daily before meals and nightly., Disp: 1200 mL, Rfl: 0    traZODone (DESYREL) 50 MG tablet, Take 2 tablets (100 mg total) by mouth nightly as needed for Insomnia., Disp: 180 tablet, Rfl: 1    triamcinolone acetonide 0.1% (KENALOG) 0.1 % cream, Apply topically 2 (two) times daily., Disp: 45 g, Rfl: 2    albuterol (PROVENTIL/VENTOLIN HFA) 90 mcg/actuation inhaler, Inhale 2 puffs into the lungs every 6 (six) hours as needed for Wheezing. Rescue, Disp: 18 g, Rfl: 2    atorvastatin (LIPITOR) 10 MG tablet, Take 1 tablet (10 mg total) by mouth every evening., Disp: 90 tablet, Rfl: 3    FLUoxetine 40 MG capsule, Take 1 capsule (40 mg total) by mouth once daily., Disp: 90 capsule, Rfl: 3    [START ON 5/9/2023] HYDROcodone-acetaminophen (NORCO)  mg per tablet, Take 1 tablet by mouth every 8 (eight) hours as needed for Pain., Disp: 90 tablet, Rfl: 0    [START ON 6/9/2023] HYDROcodone-acetaminophen (NORCO)  mg per tablet, Take 1 tablet by mouth every 8 (eight) hours as needed for Pain., Disp: 90 tablet, Rfl: 0    [START ON 7/9/2023] HYDROcodone-acetaminophen (NORCO)  " mg per tablet, Take 1 tablet by mouth every 8 (eight) hours as needed for Pain., Disp: 90 tablet, Rfl: 0    lisinopriL 10 MG tablet, Take 1 tablet (10 mg total) by mouth once daily., Disp: 90 tablet, Rfl: 3  No current facility-administered medications for this visit.    Facility-Administered Medications Ordered in Other Visits:     lidocaine (PF) 10 mg/ml (1%) injection 5 mg, 0.5 mL, Intradermal, Once, Azeem Anderson MD    Review of Systems   Constitutional:  Negative for appetite change, chills, fever and unexpected weight change (wt down 29lbs since Nov visit- intentional).   HENT:  Negative for sore throat and trouble swallowing.    Eyes:  Negative for visual disturbance.   Respiratory:  Positive for cough (occasional cough) and shortness of breath (with exertion or walking longer distances). Negative for wheezing.    Cardiovascular:  Negative for chest pain, palpitations and leg swelling.   Gastrointestinal:  Negative for abdominal pain, blood in stool, constipation, diarrhea, nausea and vomiting.   Genitourinary:  Negative for difficulty urinating, dysuria and hematuria.   Musculoskeletal:  Positive for arthralgias (chronic right knee pain) and gait problem (mobility limited d/t knee pain, walks with cane).   Skin:  Negative for rash.   Neurological:  Negative for dizziness, syncope, speech difficulty, numbness and headaches.   Psychiatric/Behavioral:  Negative for dysphoric mood. The patient is not nervous/anxious.         Objective:      Vitals:    05/08/23 1140   BP: 128/80   Pulse: 74   SpO2: 97%   Weight: 108.7 kg (239 lb 9.6 oz)   Height: 5' 2" (1.575 m)     Physical Exam  Vitals and nursing note reviewed.   Constitutional:       General: She is not in acute distress.     Appearance: She is well-developed.      Comments: Morbidly obese white female, appears older than stated age   HENT:      Head: Normocephalic and atraumatic.   Neck:      Vascular: No carotid bruit.   Cardiovascular:      " Rate and Rhythm: Normal rate and regular rhythm.      Heart sounds: Murmur heard.     No friction rub. No gallop.   Pulmonary:      Effort: Pulmonary effort is normal. No respiratory distress.      Breath sounds: Normal breath sounds. No wheezing or rales.   Abdominal:      General: There is no distension.      Palpations: Abdomen is soft.      Tenderness: There is no abdominal tenderness.   Musculoskeletal:      Cervical back: Neck supple.      Right knee: No effusion or erythema. Normal range of motion.      Right lower leg: No edema.      Left lower leg: No edema.   Lymphadenopathy:      Cervical: No cervical adenopathy.   Skin:     General: Skin is warm and dry.      Findings: No rash.   Neurological:      General: No focal deficit present.      Mental Status: She is alert and oriented to person, place, and time.      Gait: Gait abnormal (antalgic gait).         Assessment:       1. JEFF (acute kidney injury)    2. Essential hypertension    3. Chronic obstructive pulmonary disease, unspecified COPD type    4. Primary osteoarthritis of both knees    5. Dyslipidemia    6. Fibromyalgia    7. Mild episode of recurrent major depressive disorder    8. Encounter for long-term (current) use of other medications    9. Encounter for therapeutic drug monitoring    10. HFrEF (heart failure with reduced ejection fraction)           Plan:       1. JEFF (acute kidney injury)  -reviewed recent labs with pt with CR up to 2.49, GFR 20- pt denies any NSAID use, reports has been feeling good without c/o's- recommend repeat labs today and will call with results- advised if GFR remains low recommend renal referral  -     Renal Function Panel; Future; Expected date: 05/08/2023    2. Essential hypertension  -BP well controlled  -     lisinopriL 10 MG tablet; Take 1 tablet (10 mg total) by mouth once daily.  Dispense: 90 tablet; Refill: 3    3. Chronic obstructive pulmonary disease, unspecified COPD type  -stable, still smoking a couple  cigarettes  -     albuterol (PROVENTIL/VENTOLIN HFA) 90 mcg/actuation inhaler; Inhale 2 puffs into the lungs every 6 (six) hours as needed for Wheezing. Rescue  Dispense: 18 g; Refill: 2    4. Primary osteoarthritis of both knees  -stable  -     DRUG MONITOR, PANEL 4, W/CONF, URINE; Future; Expected date: 05/08/2023    5. Dyslipidemia  -well controlled on recent labs  -     atorvastatin (LIPITOR) 10 MG tablet; Take 1 tablet (10 mg total) by mouth every evening.  Dispense: 90 tablet; Refill: 3    6. Fibromyalgia   -stable pain c/o's    7. Mild episode of recurrent major depressive disorder  -stable on med  -     FLUoxetine 40 MG capsule; Take 1 capsule (40 mg total) by mouth once daily.  Dispense: 90 capsule; Refill: 3    8. Encounter for long-term (current) use of other medications  -     DRUG MONITOR, PANEL 4, W/CONF, URINE; Future; Expected date: 05/08/2023    9. Encounter for therapeutic drug monitoring  -     DRUG MONITOR, PANEL 4, W/CONF, URINE; Future; Expected date: 05/08/2023    10. HFrEF   -encouraged pt to schedule f/u with cards. If renal fxn remains declined may need repeat echo    Follow up in about 3 months (around 8/8/2023) for labs to be drawn today, HTN, CKD.      Pt encouraged to schedule mammogram and collect cologuard stool kit    Counseled on age and gender appropriate medical preventative services, including cancer screenings, immunizations, overall nutritional health, need for a consistent exercise regimen and an overall push towards maintaining a vigorous and active lifestyle.      5/8/2023 Roselyn Cote NP

## 2023-05-10 ENCOUNTER — TELEPHONE (OUTPATIENT)
Dept: FAMILY MEDICINE | Facility: CLINIC | Age: 68
End: 2023-05-10

## 2023-05-10 DIAGNOSIS — N17.9 ACUTE KIDNEY INJURY SUPERIMPOSED ON CHRONIC KIDNEY DISEASE: ICD-10-CM

## 2023-05-10 DIAGNOSIS — N18.9 ACUTE KIDNEY INJURY SUPERIMPOSED ON CHRONIC KIDNEY DISEASE: ICD-10-CM

## 2023-05-10 DIAGNOSIS — N17.9 AKI (ACUTE KIDNEY INJURY): ICD-10-CM

## 2023-05-10 DIAGNOSIS — N18.31 STAGE 3A CHRONIC KIDNEY DISEASE: Primary | ICD-10-CM

## 2023-05-10 NOTE — TELEPHONE ENCOUNTER
Patient may need to call Dr. Jovel's office to schedule appointment so please her give her office phone number, I am not sure if his office calls patient's

## 2023-05-10 NOTE — TELEPHONE ENCOUNTER
Spoke with pt in regards to recent lab results. Verbalized per Roselyn that pt's kidney function has improved just slightly. Pt's GFR is now 26 from 20 last week though down from 54 in December. Given this, Roselyn would recommend a referral to Dr. Jovel, nephrology for further evaluation. Roselyn would also recommend she follow-up with Cardiology to make sure her heart function is not playing a role in her weakened kidney function. Pt acknowledged understanding.

## 2023-05-10 NOTE — TELEPHONE ENCOUNTER
----- Message from Roselyn Cote NP sent at 5/9/2023  5:09 PM CDT -----  Please call patient and let her know repeat labs show kidney function has improved just slightly.  Her GFR is now 26 from 20 last week though down from 54 in December.  Given this I would recommend we refer her to Dr. Jovel, nephrology for further evaluation.  I would also recommend she follow-up with Cardiology to make sure her heart function is not playing a role in her weakened kidney function.

## 2023-05-10 NOTE — TELEPHONE ENCOUNTER
Left message on voice mail, verbalizing that Roselyn is unsure if Dr. Jovel's office call referral pts to scheduled them. Roselyn wanted me to give Dr. Jovel office number. Gave pt Dr. Jovel's office telephone number.

## 2023-05-11 LAB
ALBUMIN SERPL-MCNC: 4.2 G/DL (ref 3.6–5.1)
AMPHET UR-MCNC: NEGATIVE NG/ML
AMPHETAMINES UR QL: NORMAL NG/ML
BARBITURATES UR QL: NEGATIVE NG/ML
BENZODIAZ UR QL: NEGATIVE NG/ML
BUN SERPL-MCNC: 19 MG/DL (ref 7–25)
BUN/CREAT SERPL: 9 (CALC) (ref 6–22)
BZE UR QL: NEGATIVE NG/ML
CALCIUM SERPL-MCNC: 9.7 MG/DL (ref 8.6–10.4)
CHLORIDE SERPL-SCNC: 103 MMOL/L (ref 98–110)
CO2 SERPL-SCNC: 26 MMOL/L (ref 20–32)
CREAT SERPL-MCNC: 2.03 MG/DL (ref 0.5–1.05)
CREAT UR-MCNC: 107.9 MG/DL
DRUG SCREEN COMMENT UR-IMP: NORMAL
EGFR: 26 ML/MIN/1.73M2
GLUCOSE SERPL-MCNC: 79 MG/DL (ref 65–99)
METHADONE UR QL: NEGATIVE NG/ML
METHAMPHET UR-MCNC: NEGATIVE NG/ML
NOTES AND COMMENTS: NORMAL
OPIATES UR QL: NEGATIVE NG/ML
OXIDANTS UR QL: NEGATIVE MCG/ML
OXYCODONE UR QL: NEGATIVE NG/ML
PCP UR QL: NEGATIVE NG/ML
PH UR: 6.1 [PH] (ref 4.5–9)
PHOSPHATE SERPL-MCNC: 4.4 MG/DL (ref 2.1–4.3)
POTASSIUM SERPL-SCNC: 4.3 MMOL/L (ref 3.5–5.3)
SODIUM SERPL-SCNC: 137 MMOL/L (ref 135–146)

## 2023-06-12 ENCOUNTER — OFFICE VISIT (OUTPATIENT)
Dept: CARDIOLOGY | Facility: CLINIC | Age: 68
End: 2023-06-12
Payer: MEDICARE

## 2023-06-12 ENCOUNTER — TELEPHONE (OUTPATIENT)
Dept: CARDIOLOGY | Facility: CLINIC | Age: 68
End: 2023-06-12
Payer: MEDICARE

## 2023-06-12 ENCOUNTER — TELEPHONE (OUTPATIENT)
Dept: CARDIOLOGY | Facility: CLINIC | Age: 68
End: 2023-06-12

## 2023-06-12 VITALS
HEIGHT: 62 IN | WEIGHT: 242.5 LBS | DIASTOLIC BLOOD PRESSURE: 70 MMHG | HEART RATE: 120 BPM | SYSTOLIC BLOOD PRESSURE: 132 MMHG | BODY MASS INDEX: 44.63 KG/M2

## 2023-06-12 DIAGNOSIS — I42.8 NONISCHEMIC CARDIOMYOPATHY: ICD-10-CM

## 2023-06-12 DIAGNOSIS — I35.2 NONRHEUMATIC AORTIC VALVE STENOSIS WITH REGURGITATION: Primary | ICD-10-CM

## 2023-06-12 DIAGNOSIS — I48.0 PAROXYSMAL ATRIAL FIBRILLATION: ICD-10-CM

## 2023-06-12 DIAGNOSIS — I50.20 HFREF (HEART FAILURE WITH REDUCED EJECTION FRACTION): ICD-10-CM

## 2023-06-12 PROCEDURE — 1126F AMNT PAIN NOTED NONE PRSNT: CPT | Mod: CPTII,S$GLB,, | Performed by: INTERNAL MEDICINE

## 2023-06-12 PROCEDURE — 1159F PR MEDICATION LIST DOCUMENTED IN MEDICAL RECORD: ICD-10-PCS | Mod: CPTII,S$GLB,, | Performed by: INTERNAL MEDICINE

## 2023-06-12 PROCEDURE — 1160F RVW MEDS BY RX/DR IN RCRD: CPT | Mod: CPTII,S$GLB,, | Performed by: INTERNAL MEDICINE

## 2023-06-12 PROCEDURE — 3075F SYST BP GE 130 - 139MM HG: CPT | Mod: CPTII,S$GLB,, | Performed by: INTERNAL MEDICINE

## 2023-06-12 PROCEDURE — 1160F PR REVIEW ALL MEDS BY PRESCRIBER/CLIN PHARMACIST DOCUMENTED: ICD-10-PCS | Mod: CPTII,S$GLB,, | Performed by: INTERNAL MEDICINE

## 2023-06-12 PROCEDURE — 99215 PR OFFICE/OUTPT VISIT, EST, LEVL V, 40-54 MIN: ICD-10-PCS | Mod: S$GLB,,, | Performed by: INTERNAL MEDICINE

## 2023-06-12 PROCEDURE — 99999 PR PBB SHADOW E&M-EST. PATIENT-LVL IV: CPT | Mod: PBBFAC,,, | Performed by: INTERNAL MEDICINE

## 2023-06-12 PROCEDURE — 3075F PR MOST RECENT SYSTOLIC BLOOD PRESS GE 130-139MM HG: ICD-10-PCS | Mod: CPTII,S$GLB,, | Performed by: INTERNAL MEDICINE

## 2023-06-12 PROCEDURE — 1126F PR PAIN SEVERITY QUANTIFIED, NO PAIN PRESENT: ICD-10-PCS | Mod: CPTII,S$GLB,, | Performed by: INTERNAL MEDICINE

## 2023-06-12 PROCEDURE — 3008F PR BODY MASS INDEX (BMI) DOCUMENTED: ICD-10-PCS | Mod: CPTII,S$GLB,, | Performed by: INTERNAL MEDICINE

## 2023-06-12 PROCEDURE — 4010F ACE/ARB THERAPY RXD/TAKEN: CPT | Mod: CPTII,S$GLB,, | Performed by: INTERNAL MEDICINE

## 2023-06-12 PROCEDURE — 3078F DIAST BP <80 MM HG: CPT | Mod: CPTII,S$GLB,, | Performed by: INTERNAL MEDICINE

## 2023-06-12 PROCEDURE — 4010F PR ACE/ARB THEARPY RXD/TAKEN: ICD-10-PCS | Mod: CPTII,S$GLB,, | Performed by: INTERNAL MEDICINE

## 2023-06-12 PROCEDURE — 3078F PR MOST RECENT DIASTOLIC BLOOD PRESSURE < 80 MM HG: ICD-10-PCS | Mod: CPTII,S$GLB,, | Performed by: INTERNAL MEDICINE

## 2023-06-12 PROCEDURE — 3008F BODY MASS INDEX DOCD: CPT | Mod: CPTII,S$GLB,, | Performed by: INTERNAL MEDICINE

## 2023-06-12 PROCEDURE — 1159F MED LIST DOCD IN RCRD: CPT | Mod: CPTII,S$GLB,, | Performed by: INTERNAL MEDICINE

## 2023-06-12 PROCEDURE — 3288F FALL RISK ASSESSMENT DOCD: CPT | Mod: CPTII,S$GLB,, | Performed by: INTERNAL MEDICINE

## 2023-06-12 PROCEDURE — 93000 EKG 12-LEAD: ICD-10-PCS | Mod: S$GLB,,, | Performed by: INTERNAL MEDICINE

## 2023-06-12 PROCEDURE — 3066F NEPHROPATHY DOC TX: CPT | Mod: CPTII,S$GLB,, | Performed by: INTERNAL MEDICINE

## 2023-06-12 PROCEDURE — 3061F PR NEG MICROALBUMINURIA RESULT DOCUMENTED/REVIEW: ICD-10-PCS | Mod: CPTII,S$GLB,, | Performed by: INTERNAL MEDICINE

## 2023-06-12 PROCEDURE — 1101F PT FALLS ASSESS-DOCD LE1/YR: CPT | Mod: CPTII,S$GLB,, | Performed by: INTERNAL MEDICINE

## 2023-06-12 PROCEDURE — 3066F PR DOCUMENTATION OF TREATMENT FOR NEPHROPATHY: ICD-10-PCS | Mod: CPTII,S$GLB,, | Performed by: INTERNAL MEDICINE

## 2023-06-12 PROCEDURE — 99215 OFFICE O/P EST HI 40 MIN: CPT | Mod: S$GLB,,, | Performed by: INTERNAL MEDICINE

## 2023-06-12 PROCEDURE — 3288F PR FALLS RISK ASSESSMENT DOCUMENTED: ICD-10-PCS | Mod: CPTII,S$GLB,, | Performed by: INTERNAL MEDICINE

## 2023-06-12 PROCEDURE — 1101F PR PT FALLS ASSESS DOC 0-1 FALLS W/OUT INJ PAST YR: ICD-10-PCS | Mod: CPTII,S$GLB,, | Performed by: INTERNAL MEDICINE

## 2023-06-12 PROCEDURE — 93000 ELECTROCARDIOGRAM COMPLETE: CPT | Mod: S$GLB,,, | Performed by: INTERNAL MEDICINE

## 2023-06-12 PROCEDURE — 99999 PR PBB SHADOW E&M-EST. PATIENT-LVL IV: ICD-10-PCS | Mod: PBBFAC,,, | Performed by: INTERNAL MEDICINE

## 2023-06-12 PROCEDURE — 3061F NEG MICROALBUMINURIA REV: CPT | Mod: CPTII,S$GLB,, | Performed by: INTERNAL MEDICINE

## 2023-06-12 RX ORDER — FUROSEMIDE 20 MG/1
20 TABLET ORAL DAILY PRN
COMMUNITY
End: 2024-02-03

## 2023-06-12 RX ORDER — METOPROLOL SUCCINATE 25 MG/1
25 TABLET, EXTENDED RELEASE ORAL DAILY
Qty: 90 TABLET | Refills: 3 | Status: SHIPPED | OUTPATIENT
Start: 2023-06-12 | End: 2023-06-12

## 2023-06-12 RX ORDER — METOPROLOL SUCCINATE 25 MG/1
25 TABLET, EXTENDED RELEASE ORAL DAILY
Qty: 90 TABLET | Refills: 3 | Status: SHIPPED | OUTPATIENT
Start: 2023-06-12 | End: 2024-01-29 | Stop reason: SDUPTHER

## 2023-06-12 NOTE — PROGRESS NOTES
[facility] [place]    Subjective:     Patient ID:  Iris Mueller is a 67 y.o. female patient here for evaluation Hypertension and Post-op Evaluation      HPI:  67-year-old female here for follow-up after an angiogram in December which showed moderate CAD which was done for low EF.  Patient also history of aortic stenosis and aortic regurgitation on the echo.  EF on echo is around 40%.  Patient reports taking Lasix as needed.  Reports getting short of breath with moderate exertion.  In the clinic EKG shows atrial fibrillation with rate of around 120 beats per minute.  No history of AFib in the past.    Review of Systems   All other systems reviewed and are negative.     Past Medical History:   Diagnosis Date    Arthritis     Asthma     COPD (chronic obstructive pulmonary disease)     Encounter for blood transfusion     Hypertension     Wound infection 2019    Right knee       Past Surgical History:   Procedure Laterality Date    ANGIOGRAM, CORONARY, WITH LEFT HEART CATHETERIZATION N/A 2022    Procedure: Angiogram, Coronary, with Left Heart Cath;  Surgeon: Jorge Tran MD;  Location: [facility] CATH/EP LAB;  Service: Cardiology;  Laterality: N/A;     SECTION      JOINT REPLACEMENT      Knee    KNEE ARTHROPLASTY Right 2019    Procedure: ARTHROPLASTY, KNEE;  Surgeon: Delio Chung MD;  Location: [facility] OR;  Service: Orthopedics;  Laterality: Right;  anesthesia:  general and block    REPLACEMENT OF WOUND VACUUM-ASSISTED CLOSURE DEVICE Right 2019    Procedure: REPLACEMENT, WOUND VAC;  Surgeon: Delio Chung MD;  Location: [facility] OR;  Service: Orthopedics;  Laterality: Right;    TONSILLECTOMY      VENTRICULOGRAM, LEFT  2022    Procedure: Ventriculogram, Left;  Surgeon: Jorge Tran MD;  Location: [facility] CATH/EP LAB;  Service: Cardiology;;       Family History   Problem Relation Age of Onset    Alzheimer's disease Mother         Social History     Socioeconomic History    Marital status:    Tobacco Use    Smoking status: Every Day     Packs/day: 0.25     Years: 30.00     Pack years: 7.50     Types: Cigarettes    Smokeless tobacco: Never   Substance and Sexual Activity    Alcohol use: Yes     Comment: RARELY    Drug use: Never    Sexual activity: Not Currently     Social Determinants of Health     Financial Resource Strain: Unknown    Difficulty of Paying Living Expenses: Patient refused   Food Insecurity: Unknown    Worried About Running Out of Food in the Last Year: Patient refused    Ran Out of Food in the Last Year: Patient refused   Transportation Needs: Unknown    Lack of Transportation (Medical): Patient refused    Lack of Transportation (Non-Medical): Patient refused   Physical Activity: Unknown    Days of Exercise per Week: Patient refused    Minutes of Exercise per Session: Patient refused   Stress: Unknown    Feeling of Stress : Patient refused   Social Connections: Unknown    Frequency of Communication with Friends and Family: Patient refused    Frequency of Social Gatherings with Friends and Family: Patient refused    Attends Oriental orthodox Services: Patient refused    Active Member of Clubs or Organizations: Patient refused    Attends Club or Organization Meetings: Patient refused    Marital Status: Patient refused   Housing Stability: Unknown    Unable to Pay for Housing in the Last Year: Patient refused    Unstable Housing in the Last Year: Patient refused       Current Outpatient Medications   Medication Sig Dispense Refill    atorvastatin (LIPITOR) 10 MG tablet Take 1 tablet (10 mg total) by mouth every evening. 90 tablet 3    ferrous sulfate (FEOSOL) 325 mg (65 mg iron) Tab tablet Take 1 tablet (325 mg total) by mouth once daily.  0    furosemide (LASIX) 20 MG tablet Take 20 mg by mouth daily as needed for Shortness of Breath.      lisinopriL 10 MG tablet Take 1 tablet (10 mg total) by mouth once daily. 90 tablet 3     pantoprazole (PROTONIX) 40 MG tablet Take 1 tablet (40 mg total) by mouth 2 (two) times daily. 60 tablet 0    albuterol (PROVENTIL/VENTOLIN HFA) 90 mcg/actuation inhaler Inhale 2 puffs into the lungs every 6 (six) hours as needed for Wheezing. Rescue 18 g 2    ANORO ELLIPTA 62.5-25 mcg/actuation DsDv Inhale 1 puff into the lungs once daily. 60 each 5    apixaban (ELIQUIS) 5 mg Tab Take 1 tablet (5 mg total) by mouth 2 (two) times daily. 180 tablet 3    betamethasone dipropionate 0.05 % cream Apply topically 2 (two) times daily. 45 g 2    buPROPion (WELLBUTRIN SR) 150 MG TBSR 12 hr tablet Take 1 tablet (150 mg total) by mouth 2 (two) times daily. 180 tablet 1    FLUoxetine 40 MG capsule Take 1 capsule (40 mg total) by mouth once daily. 90 capsule 3    gabapentin (NEURONTIN) 300 MG capsule Take 1 capsule (300 mg total) by mouth 3 (three) times daily. 270 capsule 1    HYDROcodone-acetaminophen (NORCO)  mg per tablet Take 1 tablet by mouth every 8 (eight) hours as needed for Pain. 90 tablet 0    HYDROcodone-acetaminophen (NORCO)  mg per tablet Take 1 tablet by mouth every 8 (eight) hours as needed for Pain. 90 tablet 0    [START ON 7/9/2023] HYDROcodone-acetaminophen (NORCO)  mg per tablet Take 1 tablet by mouth every 8 (eight) hours as needed for Pain. 90 tablet 0    loratadine (CLARITIN) 10 mg tablet Take 10 mg by mouth once daily.      metoprolol succinate (TOPROL-XL) 25 MG 24 hr tablet Take 1 tablet (25 mg total) by mouth once daily. 90 tablet 3    sucralfate (CARAFATE) 100 mg/mL suspension Take 10 mLs (1 g total) by mouth 4 (four) times daily before meals and nightly. 1200 mL 0    traZODone (DESYREL) 50 MG tablet Take 2 tablets (100 mg total) by mouth nightly as needed for Insomnia. 180 tablet 1    triamcinolone acetonide 0.1% (KENALOG) 0.1 % cream Apply topically 2 (two) times daily. 45 g 2     No current facility-administered medications for this visit.     Facility-Administered Medications  Ordered in Other Visits   Medication Dose Route Frequency Provider Last Rate Last Admin    lidocaine (PF) 10 mg/ml (1%) injection 5 mg  0.5 mL Intradermal Once Azeem Anderson MD           Review of patient's allergies indicates:  No Known Allergies      Objective:        Vitals:    06/12/23 1027   BP: 132/70   Pulse: (!) 120       Physical Exam  Vitals reviewed.   Constitutional:       Appearance: Normal appearance. She is obese.   HENT:      Mouth/Throat:      Mouth: Mucous membranes are moist.   Eyes:      Pupils: Pupils are equal, round, and reactive to light.   Cardiovascular:      Rate and Rhythm: Tachycardia present. Rhythm irregular.      Pulses: Normal pulses.      Heart sounds: Normal heart sounds. No murmur heard.    No gallop.   Pulmonary:      Effort: Pulmonary effort is normal.      Breath sounds: Normal breath sounds.   Abdominal:      General: Bowel sounds are normal.      Palpations: Abdomen is soft.   Musculoskeletal:         General: Normal range of motion.      Right lower leg: Edema present.      Left lower leg: Edema present.   Skin:     General: Skin is warm and dry.   Neurological:      General: No focal deficit present.      Mental Status: She is alert and oriented to person, place, and time.   Psychiatric:         Mood and Affect: Mood normal.       LIPIDS - LAST 2   Lab Results   Component Value Date    CHOL 148 05/03/2023    CHOL 162 11/07/2022    HDL 53 05/03/2023    HDL 63 11/07/2022    LDLCALC 79 05/03/2023    LDLCALC 82 11/07/2022    TRIG 78 05/03/2023    TRIG 86 11/07/2022    CHOLHDL 2.8 05/03/2023    CHOLHDL 2.6 11/07/2022       CBC - LAST 2  Lab Results   Component Value Date    WBC 6.5 05/03/2023    WBC 7.61 12/26/2022    RBC 3.91 05/03/2023    RBC 3.13 (L) 12/26/2022    HGB 11.7 05/03/2023    HGB 9.9 (L) 12/26/2022    HCT 37.7 05/03/2023    HCT 30.7 (L) 12/26/2022    MCV 96.4 05/03/2023    MCV 98 12/26/2022    MCH 29.9 05/03/2023    MCH 31.6 (H) 12/26/2022    MCHC 31.0 (L)  05/03/2023    MCHC 32.2 12/26/2022    RDW 14.0 05/03/2023    RDW 14.9 (H) 12/26/2022     05/03/2023     12/26/2022    MPV 9.5 05/03/2023    MPV 9.2 12/26/2022    GRAN 5.5 12/26/2022    GRAN 72.1 12/26/2022    LYMPH 858 05/03/2023    LYMPH 13.2 05/03/2023    MONO 494 05/03/2023    MONO 7.6 05/03/2023    BASO 39 05/03/2023    BASO 0.02 12/26/2022    NRBC 0 12/26/2022    NRBC 0 12/25/2022       CHEMISTRY & LIVER FUNCTION - LAST 2  Lab Results   Component Value Date     05/08/2023     05/03/2023    K 4.3 05/08/2023    K 4.7 05/03/2023     05/08/2023     05/03/2023    CO2 26 05/08/2023    CO2 26 05/03/2023    ANIONGAP 5 (L) 12/26/2022    ANIONGAP 5 (L) 12/25/2022    BUN 19 05/08/2023    BUN 24 05/03/2023    CREATININE 107.9 05/08/2023    CREATININE 2.03 (H) 05/08/2023    GLU 79 05/08/2023    GLU 71 05/03/2023    CALCIUM 9.7 05/08/2023    CALCIUM 9.1 05/03/2023    PH 6.1 05/08/2023    PH 6.4 01/25/2022    MG 2.2 12/26/2022    MG 1.8 12/26/2022    ALBUMIN 4.2 05/08/2023    ALBUMIN 4.1 05/03/2023    PROT 6.7 05/03/2023    PROT 5.5 (L) 12/26/2022    ALKPHOS 57 12/26/2022    ALKPHOS 62 12/25/2022    ALT 3 (L) 05/03/2023    ALT 9 (L) 12/26/2022    AST 8 (L) 05/03/2023    AST 8 (L) 12/26/2022    BILITOT 0.5 05/03/2023    BILITOT 0.6 12/26/2022        CARDIAC PROFILE - LAST 2  Lab Results   Component Value Date     (H) 12/25/2022     (H) 12/24/2022    TROPONINI 0.523 (H) 12/23/2022    TROPONINI 0.648 (H) 12/23/2022        COAGULATION - LAST 2  No results found for: LABPT, INR, APTT    ENDOCRINE & PSA - LAST 2  Lab Results   Component Value Date    HGBA1C 5.2 12/23/2022    HGBA1C 4.9 09/14/2019    MICROALBUR 0.4 05/03/2023    MICROALBUR 0.2 01/25/2022    TSH 1.889 12/23/2022    TSH 2.88 07/15/2020    PROCAL <0.05 12/25/2022    PROCAL <0.05 12/24/2022        ECHOCARDIOGRAM RESULTS  Results for orders placed during the hospital encounter of 12/22/22    Echo    Interpretation  Summary  · The estimated ejection fraction is 40%. There is a anterior apical segment that is hypo to akinetic with a wall motion abnormality  · Grade II left ventricular diastolic dysfunction. Mean left atrial pressure 20 mm Hg. Mild mitral annular calcification  · There are segmental left ventricular wall motion abnormalities.  · Normal right ventricular size with normal right ventricular systolic function.  · Moderate left atrial enlargement.  · Moderate aortic regurgitation.  · There is moderate aortic valve stenosis.  · Aortic valve area is 1.55 cm2; peak velocity is 2.88 m/s; mean gradient is 15 mmHg.  · Moderate mitral regurgitation.  · There is mild pulmonary hypertension.  · Mild tricuspid regurgitation.  · Elevated central venous pressure (15 mmHg).  · The estimated PA systolic pressure is 47 mmHg.      CURRENT/PREVIOUS VISIT EKG  Results for orders placed or performed during the hospital encounter of 12/23/22   EKG 12-lead    Collection Time: 12/24/22  9:13 AM    Narrative    Test Reason : I95.9,    Vent. Rate : 065 BPM     Atrial Rate : 065 BPM     P-R Int : 184 ms          QRS Dur : 090 ms      QT Int : 492 ms       P-R-T Axes : 029 020 021 degrees     QTc Int : 511 ms    Normal sinus rhythm  Prolonged QT  Abnormal ECG  When compared with ECG of 22-DEC-2022 16:14,  Premature atrial complexes are no longer Present  Criteria for Septal infarct are no longer Present  Confirmed by Karson Knight MD ([id number]) on 12/27/2022 9:57:34 AM    Referred By: ROSALINE MCKINNEY           Confirmed By:Karson Knight MD     No valid procedures specified.   Results for orders placed in visit on 08/08/19    Nuc Pharm Stress test - Radiologist interpreted    Interpretation Summary    The patient reported no chest pain during the stress test.    No significant EKG changes from baseline    Myoview SPECT scan report is awaited for correlation.    No valid procedures specified.        Assessment:       1. Nonrheumatic aortic valve  stenosis with regurgitation    2. HFrEF (heart failure with reduced ejection fraction)    3. Paroxysmal atrial fibrillation    4. Nonischemic cardiomyopathy           Plan:       Nonrheumatic aortic valve stenosis with regurgitation    HFrEF (heart failure with reduced ejection fraction)  -     IN OFFICE EKG 12-LEAD (to Muse)  -     Discontinue: metoprolol succinate (TOPROL-XL) 25 MG 24 hr tablet; Take 1 tablet (25 mg total) by mouth once daily.  Dispense: 90 tablet; Refill: 3  -     Cancel: Basic metabolic panel; Future; Expected date: 06/12/2023  -     Cancel: B-TYPE NATRIURETIC PEPTIDE; Future; Expected date: 06/12/2023  -     Basic metabolic panel; Future; Expected date: 06/12/2023  -     B-TYPE NATRIURETIC PEPTIDE; Future; Expected date: 06/12/2023  -     metoprolol succinate (TOPROL-XL) 25 MG 24 hr tablet; Take 1 tablet (25 mg total) by mouth once daily.  Dispense: 90 tablet; Refill: 3    Paroxysmal atrial fibrillation  -     Ambulatory referral/consult to Electrophysiology; Future; Expected date: 06/19/2023  -     Discontinue: metoprolol succinate (TOPROL-XL) 25 MG 24 hr tablet; Take 1 tablet (25 mg total) by mouth once daily.  Dispense: 90 tablet; Refill: 3  -     Discontinue: apixaban (ELIQUIS) 5 mg Tab; Take 1 tablet (5 mg total) by mouth 2 (two) times daily.  Dispense: 180 tablet; Refill: 3  -     metoprolol succinate (TOPROL-XL) 25 MG 24 hr tablet; Take 1 tablet (25 mg total) by mouth once daily.  Dispense: 90 tablet; Refill: 3  -     apixaban (ELIQUIS) 5 mg Tab; Take 1 tablet (5 mg total) by mouth 2 (two) times daily.  Dispense: 180 tablet; Refill: 3    Nonischemic cardiomyopathy    Patient recommended admission to the hospital for her new onset atrial fibrillation with RVR but patient does not want to get admitted.  Informed her about the risks of not getting admitted including worsening heart failure and death.  Patient verbalized understanding.  Will initiate patient on low-dose metoprolol along with  Eliquis.  Obtain a follow-up BMP and a BNP.  Will refer the patient to Dr. Pedroza for further evaluation, patient recommended to go to [place] to see him sooner but patient cites personal issues.  Continue with lisinopril for hypertension and cardiomyopathy.    Follow up in about 4 weeks (around 7/10/2023) for f/u afib rvr, BMP bnp for hfref.          MD [facility] [place]  [place]

## 2023-06-12 NOTE — TELEPHONE ENCOUNTER
----- Message from Natalya Ndiaye sent at 6/12/2023 11:41 AM CDT -----  Contact: pt [phone]  Type: Needs Medical Advice  Who Called:  Pt     Best Call Back Number: [phone] (home)     Additional Information: Pt downstairs at pharmacy and they told her office has not released her refills yet. Pt also confused because she has never taken elliquis and that is one of her rx's. Pls call back and advise

## 2023-06-12 NOTE — TELEPHONE ENCOUNTER
----- Message from Fanny Kingston sent at 6/12/2023 11:55 AM CDT -----  Regarding: Medication questions  Patient was seen today and stated that she could not get her prescription filled downstairs because the pharmacy said she was already on this medication and it was too early to fill/ patient states that she has never been on these medications/ please call to advise/ thanks

## 2023-06-29 DIAGNOSIS — F33.0 MILD EPISODE OF RECURRENT MAJOR DEPRESSIVE DISORDER: ICD-10-CM

## 2023-06-29 RX ORDER — BUPROPION HYDROCHLORIDE 150 MG/1
150 TABLET, EXTENDED RELEASE ORAL 2 TIMES DAILY
Qty: 180 TABLET | Refills: 3 | Status: SHIPPED | OUTPATIENT
Start: 2023-06-29 | End: 2024-06-28

## 2023-06-29 NOTE — TELEPHONE ENCOUNTER
Pt is needing a refill on her trazodone and bupropion. Last office visit 05/08/2023. Next office visit 08/09/2023.      Looks like that pt's trazodone was sent to the pharmacy on 02/08/2023 for a 90 day supply with 1 refills.

## 2023-06-29 NOTE — TELEPHONE ENCOUNTER
Left message on voice mail, verbalizing that she still has a refill on her trazodone. Stated that all she needs to do is call her pharmacy for this refill.

## 2023-06-29 NOTE — TELEPHONE ENCOUNTER
----- Message from Emily Lincoln MA sent at 6/29/2023 12:33 PM CDT -----  Regarding: refill  Needs refill on trazadone, bupropion   [facility]  [phone]

## 2023-07-07 DIAGNOSIS — F51.01 PRIMARY INSOMNIA: ICD-10-CM

## 2023-07-07 RX ORDER — TRAZODONE HYDROCHLORIDE 50 MG/1
100 TABLET ORAL NIGHTLY PRN
Qty: 180 TABLET | Refills: 1 | Status: SHIPPED | OUTPATIENT
Start: 2023-07-07

## 2023-07-07 RX ORDER — TRAZODONE HYDROCHLORIDE 50 MG/1
100 TABLET ORAL NIGHTLY PRN
Qty: 180 TABLET | Refills: 1 | Status: SHIPPED | OUTPATIENT
Start: 2023-07-07 | End: 2023-07-07 | Stop reason: SDUPTHER

## 2023-07-07 NOTE — TELEPHONE ENCOUNTER
----- Message from Tana Brown MA sent at 7/7/2023 11:19 AM CDT -----    ----- Message -----  From: Carolyne Khoury  Sent: 7/7/2023  11:15 AM CDT  To: [facility] Staff    Pt pharmacy will not fill her trazodone and she is not sure why.   She also needs refill on her pain medication   [facility]   [phone]

## 2023-07-07 NOTE — TELEPHONE ENCOUNTER
Spoke to pt and let Jackson is sent the pharmacy but can not be filled until 7/09. And Trazodone is needing refills.   Last office visit 5/08  Next office visit 8/9

## 2023-07-12 ENCOUNTER — OFFICE VISIT (OUTPATIENT)
Dept: CARDIOLOGY | Facility: CLINIC | Age: 68
End: 2023-07-12
Payer: MEDICARE

## 2023-07-12 VITALS
OXYGEN SATURATION: 97 % | HEART RATE: 51 BPM | WEIGHT: 237 LBS | DIASTOLIC BLOOD PRESSURE: 80 MMHG | SYSTOLIC BLOOD PRESSURE: 130 MMHG | HEIGHT: 62 IN | BODY MASS INDEX: 43.61 KG/M2

## 2023-07-12 DIAGNOSIS — I10 ESSENTIAL HYPERTENSION: ICD-10-CM

## 2023-07-12 DIAGNOSIS — I42.8 NONISCHEMIC CARDIOMYOPATHY: ICD-10-CM

## 2023-07-12 DIAGNOSIS — E66.01 MORBID OBESITY: ICD-10-CM

## 2023-07-12 DIAGNOSIS — I50.20 HFREF (HEART FAILURE WITH REDUCED EJECTION FRACTION): ICD-10-CM

## 2023-07-12 DIAGNOSIS — E87.1 HYPONATREMIA: ICD-10-CM

## 2023-07-12 DIAGNOSIS — I35.2 NONRHEUMATIC AORTIC VALVE STENOSIS WITH REGURGITATION: Primary | ICD-10-CM

## 2023-07-12 DIAGNOSIS — E78.5 DYSLIPIDEMIA: ICD-10-CM

## 2023-07-12 DIAGNOSIS — I48.0 PAROXYSMAL ATRIAL FIBRILLATION: ICD-10-CM

## 2023-07-12 DIAGNOSIS — J43.1 PANLOBULAR EMPHYSEMA: ICD-10-CM

## 2023-07-12 PROBLEM — N18.9 CHRONIC KIDNEY DISEASE: Status: ACTIVE | Noted: 2019-09-13

## 2023-07-12 LAB
BNP SERPL-MCNC: 95 PG/ML
BUN SERPL-MCNC: 29 MG/DL (ref 7–25)
BUN/CREAT SERPL: 12 (CALC) (ref 6–22)
CALCIUM SERPL-MCNC: 8.8 MG/DL (ref 8.6–10.4)
CHLORIDE SERPL-SCNC: 96 MMOL/L (ref 98–110)
CO2 SERPL-SCNC: 25 MMOL/L (ref 20–32)
CREAT SERPL-MCNC: 2.35 MG/DL (ref 0.5–1.05)
EGFR: 22 ML/MIN/1.73M2
GLUCOSE SERPL-MCNC: 82 MG/DL (ref 65–99)
POTASSIUM SERPL-SCNC: 4.8 MMOL/L (ref 3.5–5.3)
SODIUM SERPL-SCNC: 129 MMOL/L (ref 135–146)

## 2023-07-12 PROCEDURE — 3066F NEPHROPATHY DOC TX: CPT | Mod: CPTII,S$GLB,, | Performed by: NURSE PRACTITIONER

## 2023-07-12 PROCEDURE — 4010F ACE/ARB THERAPY RXD/TAKEN: CPT | Mod: CPTII,S$GLB,, | Performed by: NURSE PRACTITIONER

## 2023-07-12 PROCEDURE — 3066F PR DOCUMENTATION OF TREATMENT FOR NEPHROPATHY: ICD-10-PCS | Mod: CPTII,S$GLB,, | Performed by: NURSE PRACTITIONER

## 2023-07-12 PROCEDURE — 1159F PR MEDICATION LIST DOCUMENTED IN MEDICAL RECORD: ICD-10-PCS | Mod: CPTII,S$GLB,, | Performed by: NURSE PRACTITIONER

## 2023-07-12 PROCEDURE — 3288F FALL RISK ASSESSMENT DOCD: CPT | Mod: CPTII,S$GLB,, | Performed by: NURSE PRACTITIONER

## 2023-07-12 PROCEDURE — 1159F MED LIST DOCD IN RCRD: CPT | Mod: CPTII,S$GLB,, | Performed by: NURSE PRACTITIONER

## 2023-07-12 PROCEDURE — 3079F PR MOST RECENT DIASTOLIC BLOOD PRESSURE 80-89 MM HG: ICD-10-PCS | Mod: CPTII,S$GLB,, | Performed by: NURSE PRACTITIONER

## 2023-07-12 PROCEDURE — 93005 EKG 12-LEAD: ICD-10-PCS | Mod: S$GLB,,, | Performed by: NURSE PRACTITIONER

## 2023-07-12 PROCEDURE — 1126F PR PAIN SEVERITY QUANTIFIED, NO PAIN PRESENT: ICD-10-PCS | Mod: CPTII,S$GLB,, | Performed by: NURSE PRACTITIONER

## 2023-07-12 PROCEDURE — 93010 EKG 12-LEAD: ICD-10-PCS | Mod: S$GLB,,, | Performed by: INTERNAL MEDICINE

## 2023-07-12 PROCEDURE — 1101F PT FALLS ASSESS-DOCD LE1/YR: CPT | Mod: CPTII,S$GLB,, | Performed by: NURSE PRACTITIONER

## 2023-07-12 PROCEDURE — 3075F SYST BP GE 130 - 139MM HG: CPT | Mod: CPTII,S$GLB,, | Performed by: NURSE PRACTITIONER

## 2023-07-12 PROCEDURE — 3075F PR MOST RECENT SYSTOLIC BLOOD PRESS GE 130-139MM HG: ICD-10-PCS | Mod: CPTII,S$GLB,, | Performed by: NURSE PRACTITIONER

## 2023-07-12 PROCEDURE — 1126F AMNT PAIN NOTED NONE PRSNT: CPT | Mod: CPTII,S$GLB,, | Performed by: NURSE PRACTITIONER

## 2023-07-12 PROCEDURE — 99999 PR PBB SHADOW E&M-EST. PATIENT-LVL V: CPT | Mod: PBBFAC,,, | Performed by: NURSE PRACTITIONER

## 2023-07-12 PROCEDURE — 3008F PR BODY MASS INDEX (BMI) DOCUMENTED: ICD-10-PCS | Mod: CPTII,S$GLB,, | Performed by: NURSE PRACTITIONER

## 2023-07-12 PROCEDURE — 3061F NEG MICROALBUMINURIA REV: CPT | Mod: CPTII,S$GLB,, | Performed by: NURSE PRACTITIONER

## 2023-07-12 PROCEDURE — 99214 OFFICE O/P EST MOD 30 MIN: CPT | Mod: S$GLB,,, | Performed by: NURSE PRACTITIONER

## 2023-07-12 PROCEDURE — 3008F BODY MASS INDEX DOCD: CPT | Mod: CPTII,S$GLB,, | Performed by: NURSE PRACTITIONER

## 2023-07-12 PROCEDURE — 99999 PR PBB SHADOW E&M-EST. PATIENT-LVL V: ICD-10-PCS | Mod: PBBFAC,,, | Performed by: NURSE PRACTITIONER

## 2023-07-12 PROCEDURE — 3061F PR NEG MICROALBUMINURIA RESULT DOCUMENTED/REVIEW: ICD-10-PCS | Mod: CPTII,S$GLB,, | Performed by: NURSE PRACTITIONER

## 2023-07-12 PROCEDURE — 1101F PR PT FALLS ASSESS DOC 0-1 FALLS W/OUT INJ PAST YR: ICD-10-PCS | Mod: CPTII,S$GLB,, | Performed by: NURSE PRACTITIONER

## 2023-07-12 PROCEDURE — 99214 PR OFFICE/OUTPT VISIT, EST, LEVL IV, 30-39 MIN: ICD-10-PCS | Mod: S$GLB,,, | Performed by: NURSE PRACTITIONER

## 2023-07-12 PROCEDURE — 93005 ELECTROCARDIOGRAM TRACING: CPT | Mod: S$GLB,,, | Performed by: NURSE PRACTITIONER

## 2023-07-12 PROCEDURE — 1160F RVW MEDS BY RX/DR IN RCRD: CPT | Mod: CPTII,S$GLB,, | Performed by: NURSE PRACTITIONER

## 2023-07-12 PROCEDURE — 4010F PR ACE/ARB THEARPY RXD/TAKEN: ICD-10-PCS | Mod: CPTII,S$GLB,, | Performed by: NURSE PRACTITIONER

## 2023-07-12 PROCEDURE — 3079F DIAST BP 80-89 MM HG: CPT | Mod: CPTII,S$GLB,, | Performed by: NURSE PRACTITIONER

## 2023-07-12 PROCEDURE — 3288F PR FALLS RISK ASSESSMENT DOCUMENTED: ICD-10-PCS | Mod: CPTII,S$GLB,, | Performed by: NURSE PRACTITIONER

## 2023-07-12 PROCEDURE — 1160F PR REVIEW ALL MEDS BY PRESCRIBER/CLIN PHARMACIST DOCUMENTED: ICD-10-PCS | Mod: CPTII,S$GLB,, | Performed by: NURSE PRACTITIONER

## 2023-07-12 PROCEDURE — 93010 ELECTROCARDIOGRAM REPORT: CPT | Mod: S$GLB,,, | Performed by: INTERNAL MEDICINE

## 2023-07-12 NOTE — ASSESSMENT & PLAN NOTE
 The estimated ejection fraction is 40%. There is a anterior apical segment that is hypo to akinetic with a wall motion abnormality   Grade II left ventricular diastolic dysfunction. Mean left atrial pressure 20 mm Hg. Mild mitral annular calcification   There are segmental left ventricular wall motion abnormalities.   Normal right ventricular size with normal right ventricular systolic function.   Moderate left atrial enlargement.   Moderate aortic regurgitation.   There is moderate aortic valve stenosis.   Aortic valve area is 1.55 cm2; peak velocity is 2.88 m/s; mean gradient is 15 mmHg.   Moderate mitral regurgitation.   There is mild pulmonary hypertension.   Mild tricuspid regurgitation.   Elevated central venous pressure (15 mmHg).   The estimated PA systolic pressure is 47 mmHg.

## 2023-07-12 NOTE — ASSESSMENT & PLAN NOTE
Patient was euvolemic on exam.  No crackles or coughing noted.  She was breathing comfortably.  No pitting edema to extremities.  Continue lisinopril, metoprolol, and Lasix p.r.n. only.  She states she has not had to take Lasix for months.  Daily weights recommended.  Patient has hypo natremia so did not recommend low-sodium diet

## 2023-07-12 NOTE — ASSESSMENT & PLAN NOTE
There is moderate aortic valve stenosis.  · Aortic valve area is 1.55 cm2; peak velocity is 2.88 m/s; mean gradient is 15 mmHg.

## 2023-07-12 NOTE — ASSESSMENT & PLAN NOTE
Latest Reference Range & Units Most Recent   eGFR > OR = 60 mL/min/1.73m2 22 (L)  7/11/23 10:19   (L): Data is abnormally low  Patient recently saw Dr. Deng

## 2023-07-12 NOTE — PROGRESS NOTES
" Subjective:    Patient ID:  Iris Mueller is a 67 y.o. female patient here for evaluation Follow-up    History of Present Illness:  Pt is in clinic today for 4 week f/up.  Last visit she saw Dr. Tran and was found to be in atrial fibrillation in the 120s.  She was started on metoprolol and Eliquis and she reports compliance with these medications.  Dr. Tran wanted her to go to the emergency room but she declined this.  Her heart rate is well controlled today in the office.  She has not heard from Dr. Pedroza's office yet.  She did get labs as directed and these were reviewed at the visit.  She denies any complaints of chest pain, shortness of breath, palpitations, syncope or dizziness.  She does have COPD and chronic wheezing and shortness of breath with long distance walking.  She has a history of HFrEF- BNP done yesterday but reads as "pending" in lab  She has chronic kidney disease and this is followed by Dr. Deng.    Most Recent Echocardiogram Results  Results for orders placed during the hospital encounter of 12/22/22    Echo    Interpretation Summary  · The estimated ejection fraction is 40%. There is a anterior apical segment that is hypo to akinetic with a wall motion abnormality  · Grade II left ventricular diastolic dysfunction. Mean left atrial pressure 20 mm Hg. Mild mitral annular calcification  · There are segmental left ventricular wall motion abnormalities.  · Normal right ventricular size with normal right ventricular systolic function.  · Moderate left atrial enlargement.  · Moderate aortic regurgitation.  · There is moderate aortic valve stenosis.  · Aortic valve area is 1.55 cm2; peak velocity is 2.88 m/s; mean gradient is 15 mmHg.  · Moderate mitral regurgitation.  · There is mild pulmonary hypertension.  · Mild tricuspid regurgitation.  · Elevated central venous pressure (15 mmHg).  · The estimated PA systolic pressure is 47 mmHg.      Most Recent Nuclear Stress Test Results  Results " for orders placed in visit on 08/08/19    Nuc Pharm Stress test - Radiologist interpreted    Interpretation Summary    The patient reported no chest pain during the stress test.    No significant EKG changes from baseline    Myoview SPECT scan report is awaited for correlation.      Most Recent Cardiac PET Stress Test Results  No results found for this or any previous visit.      Most Recent Cardiovascular Angiogram results  Results for orders placed during the hospital encounter of 12/23/22    Cardiac catheterization    Conclusion    The estimated blood loss was <50 mL.    The pre-procedure left ventricular end diastolic pressure was 37.    The left ventricular ejection fraction was grossly normal.    Angiographically equivocal mid left circumflex stenosis with distal PAULO 3 flow    Mild disease of LAD and right coronary artery    The procedure log was documented by Documenter: Carmen DÍAZ RN and verified by Jorge Tran MD.    Date: 12/23/2022  Time: 2:30 PM      Other Most Recent Cardiology Results  Results for orders placed during the hospital encounter of 12/23/22    CARDIAC MONITORING STRIPS      REVIEW OF SYSTEMS: As noted in HPI   CARDIOVASCULAR: No recent chest pain, palpitations, arm/neck/jaw pain, or edema.  RESPIRATORY: No recent fever, cough, SOB.  : No blood in the urine  GI: No reflux, nausea, vomiting, or blood in stool.   MUSCULOSKELETAL: No falls.   NEURO: No headaches, syncope, or dizziness.  EYES: No sudden changes in vision.     Past Medical History:   Diagnosis Date    Arthritis     Asthma     COPD (chronic obstructive pulmonary disease)     Encounter for blood transfusion     Hypertension     Wound infection 08/2019    Right knee     Past Surgical History:   Procedure Laterality Date    ANGIOGRAM, CORONARY, WITH LEFT HEART CATHETERIZATION N/A 12/23/2022    Procedure: Angiogram, Coronary, with Left Heart Cath;  Surgeon: Jorge Tran MD;  Location: [facility] CATH/EP LAB;  Service: Cardiology;   Laterality: N/A;     SECTION      JOINT REPLACEMENT      Knee    KNEE ARTHROPLASTY Right 2019    Procedure: ARTHROPLASTY, KNEE;  Surgeon: Delio Chung MD;  Location: [facility] OR;  Service: Orthopedics;  Laterality: Right;  anesthesia:  general and block    REPLACEMENT OF WOUND VACUUM-ASSISTED CLOSURE DEVICE Right 2019    Procedure: REPLACEMENT, WOUND VAC;  Surgeon: Delio Chung MD;  Location: [facility] OR;  Service: Orthopedics;  Laterality: Right;    TONSILLECTOMY      VENTRICULOGRAM, LEFT  2022    Procedure: Ventriculogram, Left;  Surgeon: Jorge Tran MD;  Location: [facility] CATH/EP LAB;  Service: Cardiology;;     Social History     Tobacco Use    Smoking status: Every Day     Packs/day: 0.25     Years: 30.00     Pack years: 7.50     Types: Cigarettes    Smokeless tobacco: Never   Substance Use Topics    Alcohol use: Yes     Comment: RARELY    Drug use: Never         Objective      Vitals:    23 1052   BP: 130/80   Pulse: (!) 51       LAST EKG  Results for orders placed or performed in visit on 23   IN OFFICE EKG 12-LEAD (to [place])    Collection Time: 23 10:38 AM    Narrative    Test Reason : I50.20,    Vent. Rate : 119 BPM     Atrial Rate : 107 BPM     P-R Int : 000 ms          QRS Dur : 090 ms      QT Int : 342 ms       P-R-T Axes : 000 061 057 degrees     QTc Int : 481 ms    Atrial fibrillation with rapid ventricular response  Anteroseptal infarct ,age undetermined  Abnormal ECG  When compared with ECG of 24-DEC-2022 09:13,  Atrial fibrillation has replaced Sinus rhythm  Vent. rate has increased BY  54 BPM  Anteroseptal infarct is now Present    Referred By:             Confirmed By:      LIPIDS - LAST 2   Lab Results   Component Value Date    CHOL 148 2023    CHOL 162 2022    HDL 53 2023    HDL 63 2022    LDLCALC 79 2023    LDLCALC 82 2022    TRIG 78 2023    TRIG 86 2022    CHOLHDL 2.8 2023    CHOLHDL 2.6 2022      CARDIAC PROFILE - LAST 2  Lab Results   Component Value Date     (H) 12/25/2022     (H) 12/24/2022    TROPONINI 0.523 (H) 12/23/2022    TROPONINI 0.648 (H) 12/23/2022      CBC - LAST 2  Lab Results   Component Value Date    WBC 6.5 05/03/2023    WBC 7.61 12/26/2022    RBC 3.91 05/03/2023    RBC 3.13 (L) 12/26/2022    HGB 11.7 05/03/2023    HGB 9.9 (L) 12/26/2022    HCT 37.7 05/03/2023    HCT 30.7 (L) 12/26/2022     05/03/2023     12/26/2022     No results found for: LABPT, INR, APTT  CHEMISTRY - LAST 2  Lab Results   Component Value Date     (L) 07/11/2023     05/08/2023    K 4.8 07/11/2023    K 4.3 05/08/2023    CL 96 (L) 07/11/2023     05/08/2023    CO2 25 07/11/2023    CO2 26 05/08/2023    ANIONGAP 5 (L) 12/26/2022    ANIONGAP 5 (L) 12/25/2022    BUN 29 (H) 07/11/2023    BUN 19 05/08/2023    CREATININE 2.35 (H) 07/11/2023    CREATININE 107.9 05/08/2023    CREATININE 2.03 (H) 05/08/2023    GLU 82 07/11/2023    GLU 79 05/08/2023    CALCIUM 8.8 07/11/2023    CALCIUM 9.7 05/08/2023    PH 6.1 05/08/2023    PH 6.4 01/25/2022    MG 2.2 12/26/2022    MG 1.8 12/26/2022    ALBUMIN 4.2 05/08/2023    ALBUMIN 4.1 05/03/2023    PROT 6.7 05/03/2023    PROT 5.5 (L) 12/26/2022    ALKPHOS 57 12/26/2022    ALKPHOS 62 12/25/2022    ALT 3 (L) 05/03/2023    ALT 9 (L) 12/26/2022    AST 8 (L) 05/03/2023    AST 8 (L) 12/26/2022    BILITOT 0.5 05/03/2023    BILITOT 0.6 12/26/2022      ENDOCRINE - LAST 2  Lab Results   Component Value Date    HGBA1C 5.2 12/23/2022    HGBA1C 4.9 09/14/2019    TSH 1.889 12/23/2022    TSH 2.88 07/15/2020        PHYSICAL EXAM  CONSTITUTIONAL: Well built, well nourished in no apparent distress  NECK: no carotid bruit, no JVD  LUNGS: CTA  CHEST WALL: no tenderness  HEART: Irregular rate and rhythm, no murmur, click, rub or gallop   ABDOMEN: soft, non-tender; bowel sounds normal; no masses,  no organomegaly  EXTREMITIES: Extremities normal, no edema, no calf  tenderness noted  NEURO: AAO X 3    I HAVE REVIEWED :    The vital signs, most recent cardiac testing, and most recent pertinent non-cardiology provider notes.    Current Outpatient Medications   Medication Instructions    albuterol (PROVENTIL/VENTOLIN HFA) 90 mcg/actuation inhaler 2 puffs, Inhalation, Every 6 hours PRN, Rescue     ANORO ELLIPTA 62.5-25 mcg/actuation DsDv 1 puff, Inhalation, Daily    atorvastatin (LIPITOR) 10 mg, Oral, Nightly    betamethasone dipropionate 0.05 % cream Topical (Top), 2 times daily    buPROPion (WELLBUTRIN SR) 150 mg, Oral, 2 times daily    ELIQUIS 5 mg, Oral, 2 times daily    ferrous sulfate (FEOSOL) 325 mg, Oral, Daily    FLUoxetine 40 mg, Oral, Daily    furosemide (LASIX) 20 mg, Oral, Daily PRN    gabapentin (NEURONTIN) 300 mg, Oral, 3 times daily    HYDROcodone-acetaminophen (NORCO)  mg per tablet 1 tablet, Oral, Every 8 hours PRN    lisinopriL 10 mg, Oral, Daily    loratadine (CLARITIN) 10 mg, Oral, Daily    metoprolol succinate (TOPROL-XL) 25 mg, Oral, Daily    pantoprazole (PROTONIX) 40 mg, Oral, 2 times daily    sucralfate (CARAFATE) 1 g, Oral, Before meals & nightly    traZODone (DESYREL) 100 mg, Oral, Nightly PRN    triamcinolone acetonide 0.1% (KENALOG) 0.1 % cream Topical (Top), 2 times daily      Assessment & Plan     Nonrheumatic aortic valve stenosis with regurgitation  There is moderate aortic valve stenosis.  · Aortic valve area is 1.55 cm2; peak velocity is 2.88 m/s; mean gradient is 15 mmHg.    Paroxysmal atrial fibrillation  Remains in atrial fibrillation with controlled rate.  Discussed importance of not missing any doses of Eliquis and continued metoprolol.  Referral has been sent to Dr. Pedroza and his nurse has been messaged to get patient scheduled.    Nonischemic cardiomyopathy  The estimated ejection fraction is 40%. There is a anterior apical segment that is hypo to akinetic with a wall motion abnormality  Grade II left ventricular diastolic  dysfunction. Mean left atrial pressure 20 mm Hg. Mild mitral annular calcification  There are segmental left ventricular wall motion abnormalities.  Normal right ventricular size with normal right ventricular systolic function.  Moderate left atrial enlargement.  Moderate aortic regurgitation.  There is moderate aortic valve stenosis.  Aortic valve area is 1.55 cm2; peak velocity is 2.88 m/s; mean gradient is 15 mmHg.  Moderate mitral regurgitation.  There is mild pulmonary hypertension.  Mild tricuspid regurgitation.  Elevated central venous pressure (15 mmHg).  The estimated PA systolic pressure is 47 mmHg.      Hyponatremia  Followed by Nephrology.  Latest sodium 129    Morbid obesity  BMI 43    Chronic obstructive pulmonary disease  Continue management per pulmonology.  She states her last p.r.n. rescue inhaler use was about 2 months ago.    Essential hypertension  BP is 130/80 mm Hg.    Dyslipidemia  LDL was well controlled at last check in November of 2022, LDL was 82  Continue Lipitor 10 mg p.o. q.h.s.    HFrEF (heart failure with reduced ejection fraction)  Patient was euvolemic on exam.  No crackles or coughing noted.  She was breathing comfortably.  No pitting edema to extremities.  Continue lisinopril, metoprolol, and Lasix p.r.n. only.  She states she has not had to take Lasix for months.  Daily weights recommended.  Patient has hypo natremia so did not recommend low-sodium diet    Chronic kidney disease   Latest Reference Range & Units Most Recent   eGFR > OR = 60 mL/min/1.73m2 22 (L)  7/11/23 10:19   (L): Data is abnormally low  Patient recently saw Dr. Deng      Follow up with Dr. Pedroza as soon as possible.  Follow up in our office in 3 months.  Discussed importance of Eliquis and metoprolol for controlled atrial fibrillation.  She voiced understanding.  Advised soon follow up with Dr. Degn regarding latest labs drawn  BNP not resulted on lab. Pt euvolemic and asymptomatic at this time

## 2023-07-12 NOTE — ASSESSMENT & PLAN NOTE
Continue management per pulmonology.  She states her last p.r.n. rescue inhaler use was about 2 months ago.

## 2023-07-12 NOTE — ASSESSMENT & PLAN NOTE
LDL was well controlled at last check in November of 2022, LDL was 82  Continue Lipitor 10 mg p.o. q.h.s.

## 2023-07-12 NOTE — ASSESSMENT & PLAN NOTE
Remains in atrial fibrillation with controlled rate.  Discussed importance of not missing any doses of Eliquis and continued metoprolol.  Referral has been sent to Dr. Pedroza and his nurse has been messaged to get patient scheduled.

## 2023-08-09 ENCOUNTER — TELEPHONE (OUTPATIENT)
Dept: FAMILY MEDICINE | Facility: CLINIC | Age: 68
End: 2023-08-09

## 2023-08-09 ENCOUNTER — OFFICE VISIT (OUTPATIENT)
Dept: FAMILY MEDICINE | Facility: CLINIC | Age: 68
End: 2023-08-09
Payer: MEDICARE

## 2023-08-09 VITALS
DIASTOLIC BLOOD PRESSURE: 88 MMHG | WEIGHT: 231.63 LBS | HEIGHT: 62 IN | BODY MASS INDEX: 42.63 KG/M2 | HEART RATE: 110 BPM | OXYGEN SATURATION: 98 % | SYSTOLIC BLOOD PRESSURE: 132 MMHG

## 2023-08-09 DIAGNOSIS — I48.91 NEW ONSET A-FIB: ICD-10-CM

## 2023-08-09 DIAGNOSIS — M17.0 PRIMARY OSTEOARTHRITIS OF BOTH KNEES: ICD-10-CM

## 2023-08-09 DIAGNOSIS — Z12.11 COLON CANCER SCREENING: ICD-10-CM

## 2023-08-09 DIAGNOSIS — N18.4 CKD (CHRONIC KIDNEY DISEASE) STAGE 4, GFR 15-29 ML/MIN: ICD-10-CM

## 2023-08-09 DIAGNOSIS — E78.5 DYSLIPIDEMIA: ICD-10-CM

## 2023-08-09 DIAGNOSIS — Z12.31 SCREENING MAMMOGRAM, ENCOUNTER FOR: ICD-10-CM

## 2023-08-09 DIAGNOSIS — M25.561 RIGHT KNEE PAIN, UNSPECIFIED CHRONICITY: ICD-10-CM

## 2023-08-09 DIAGNOSIS — N18.4 HYPERTENSIVE KIDNEY DISEASE WITH STAGE 4 CHRONIC KIDNEY DISEASE: Primary | ICD-10-CM

## 2023-08-09 DIAGNOSIS — M79.7 FIBROMYALGIA: ICD-10-CM

## 2023-08-09 DIAGNOSIS — I12.9 HYPERTENSIVE KIDNEY DISEASE WITH STAGE 4 CHRONIC KIDNEY DISEASE: Primary | ICD-10-CM

## 2023-08-09 DIAGNOSIS — J44.9 CHRONIC OBSTRUCTIVE PULMONARY DISEASE, UNSPECIFIED COPD TYPE: ICD-10-CM

## 2023-08-09 PROCEDURE — 3288F PR FALLS RISK ASSESSMENT DOCUMENTED: ICD-10-PCS | Mod: CPTII,S$GLB,, | Performed by: NURSE PRACTITIONER

## 2023-08-09 PROCEDURE — 3079F DIAST BP 80-89 MM HG: CPT | Mod: CPTII,S$GLB,, | Performed by: NURSE PRACTITIONER

## 2023-08-09 PROCEDURE — 3066F NEPHROPATHY DOC TX: CPT | Mod: CPTII,S$GLB,, | Performed by: NURSE PRACTITIONER

## 2023-08-09 PROCEDURE — 3061F PR NEG MICROALBUMINURIA RESULT DOCUMENTED/REVIEW: ICD-10-PCS | Mod: CPTII,S$GLB,, | Performed by: NURSE PRACTITIONER

## 2023-08-09 PROCEDURE — 3061F NEG MICROALBUMINURIA REV: CPT | Mod: CPTII,S$GLB,, | Performed by: NURSE PRACTITIONER

## 2023-08-09 PROCEDURE — 1160F RVW MEDS BY RX/DR IN RCRD: CPT | Mod: CPTII,S$GLB,, | Performed by: NURSE PRACTITIONER

## 2023-08-09 PROCEDURE — 99214 PR OFFICE/OUTPT VISIT, EST, LEVL IV, 30-39 MIN: ICD-10-PCS | Mod: S$GLB,,, | Performed by: NURSE PRACTITIONER

## 2023-08-09 PROCEDURE — 3075F PR MOST RECENT SYSTOLIC BLOOD PRESS GE 130-139MM HG: ICD-10-PCS | Mod: CPTII,S$GLB,, | Performed by: NURSE PRACTITIONER

## 2023-08-09 PROCEDURE — 99214 OFFICE O/P EST MOD 30 MIN: CPT | Mod: S$GLB,,, | Performed by: NURSE PRACTITIONER

## 2023-08-09 PROCEDURE — 3008F PR BODY MASS INDEX (BMI) DOCUMENTED: ICD-10-PCS | Mod: CPTII,S$GLB,, | Performed by: NURSE PRACTITIONER

## 2023-08-09 PROCEDURE — 1160F PR REVIEW ALL MEDS BY PRESCRIBER/CLIN PHARMACIST DOCUMENTED: ICD-10-PCS | Mod: CPTII,S$GLB,, | Performed by: NURSE PRACTITIONER

## 2023-08-09 PROCEDURE — 3066F PR DOCUMENTATION OF TREATMENT FOR NEPHROPATHY: ICD-10-PCS | Mod: CPTII,S$GLB,, | Performed by: NURSE PRACTITIONER

## 2023-08-09 PROCEDURE — 3288F FALL RISK ASSESSMENT DOCD: CPT | Mod: CPTII,S$GLB,, | Performed by: NURSE PRACTITIONER

## 2023-08-09 PROCEDURE — 1101F PR PT FALLS ASSESS DOC 0-1 FALLS W/OUT INJ PAST YR: ICD-10-PCS | Mod: CPTII,S$GLB,, | Performed by: NURSE PRACTITIONER

## 2023-08-09 PROCEDURE — 3008F BODY MASS INDEX DOCD: CPT | Mod: CPTII,S$GLB,, | Performed by: NURSE PRACTITIONER

## 2023-08-09 PROCEDURE — 1159F PR MEDICATION LIST DOCUMENTED IN MEDICAL RECORD: ICD-10-PCS | Mod: CPTII,S$GLB,, | Performed by: NURSE PRACTITIONER

## 2023-08-09 PROCEDURE — 1159F MED LIST DOCD IN RCRD: CPT | Mod: CPTII,S$GLB,, | Performed by: NURSE PRACTITIONER

## 2023-08-09 PROCEDURE — 4010F PR ACE/ARB THEARPY RXD/TAKEN: ICD-10-PCS | Mod: CPTII,S$GLB,, | Performed by: NURSE PRACTITIONER

## 2023-08-09 PROCEDURE — 1101F PT FALLS ASSESS-DOCD LE1/YR: CPT | Mod: CPTII,S$GLB,, | Performed by: NURSE PRACTITIONER

## 2023-08-09 PROCEDURE — 3079F PR MOST RECENT DIASTOLIC BLOOD PRESSURE 80-89 MM HG: ICD-10-PCS | Mod: CPTII,S$GLB,, | Performed by: NURSE PRACTITIONER

## 2023-08-09 PROCEDURE — 3075F SYST BP GE 130 - 139MM HG: CPT | Mod: CPTII,S$GLB,, | Performed by: NURSE PRACTITIONER

## 2023-08-09 PROCEDURE — 4010F ACE/ARB THERAPY RXD/TAKEN: CPT | Mod: CPTII,S$GLB,, | Performed by: NURSE PRACTITIONER

## 2023-08-09 RX ORDER — ERGOCALCIFEROL 1.25 MG/1
50000 CAPSULE ORAL
COMMUNITY
Start: 2023-07-13

## 2023-08-09 RX ORDER — HYDROCODONE BITARTRATE AND ACETAMINOPHEN 10; 325 MG/1; MG/1
1 TABLET ORAL EVERY 8 HOURS PRN
Qty: 90 TABLET | Refills: 0 | Status: SHIPPED | OUTPATIENT
Start: 2023-09-08 | End: 2023-12-08 | Stop reason: SDUPTHER

## 2023-08-09 RX ORDER — AMLODIPINE BESYLATE 2.5 MG/1
2.5 TABLET ORAL NIGHTLY
COMMUNITY
Start: 2023-07-27 | End: 2023-10-18 | Stop reason: SDUPTHER

## 2023-08-09 RX ORDER — HYDROCODONE BITARTRATE AND ACETAMINOPHEN 10; 325 MG/1; MG/1
1 TABLET ORAL EVERY 8 HOURS PRN
Qty: 90 TABLET | Refills: 0 | Status: SHIPPED | OUTPATIENT
Start: 2023-08-09 | End: 2023-11-10 | Stop reason: SDUPTHER

## 2023-08-09 RX ORDER — HYDROCODONE BITARTRATE AND ACETAMINOPHEN 10; 325 MG/1; MG/1
1 TABLET ORAL EVERY 8 HOURS PRN
Qty: 90 TABLET | Refills: 0 | Status: SHIPPED | OUTPATIENT
Start: 2023-10-08 | End: 2023-10-06

## 2023-08-09 RX ORDER — LISINOPRIL 10 MG/1
5 TABLET ORAL DAILY
Qty: 90 TABLET | Refills: 3
Start: 2023-08-09 | End: 2023-11-15

## 2023-08-09 RX ORDER — GABAPENTIN 300 MG/1
300 CAPSULE ORAL 3 TIMES DAILY
Qty: 270 CAPSULE | Refills: 1 | Status: SHIPPED | OUTPATIENT
Start: 2023-08-09

## 2023-08-09 NOTE — TELEPHONE ENCOUNTER
----- Message from Swetha Horton sent at 8/9/2023 10:22 AM CDT -----  Pt needs to schedule a 3 month f/u.  [phone]

## 2023-08-09 NOTE — TELEPHONE ENCOUNTER
Pt is needing a refill on her norco. Pt is being seen today for an office visit . Chart shows last dispense 07/09/2023. UDS done on 05/08/2023.

## 2023-08-09 NOTE — TELEPHONE ENCOUNTER
----- Message from Swetha Horton sent at 8/9/2023 10:26 AM CDT -----  Pt is booked with [facility] for a 3 month f/u and no longer needs a 3 month f/u with rosalie.

## 2023-08-09 NOTE — PROGRESS NOTES
SUBJECTIVE:    Patient ID: Iris Mueller is a 67 y.o. female.    Chief Complaint: Follow-up (No bottles//Pt is here for a 3 month check up and medication refills//pt is going to wait  to order the mammo, dexa and colo//JAIME )    Pt here for regular f/u- HTN/COPD/OA. Pt here with her  today    Pt reports overall been feeling okay.    Had follow-up Dr. Tran in June and noted to be in Afib HR 120s- pt denies any palpitations or dizziness, was started on Eliquis and metoprolol and had follow-up with cards 2 weeks ago with rate stable.  Has been referred to EP- has appt next week with Dr. Betancourt. Coronary angio Dec 2022 during hospital stay for CHF showed mild LAD/RCA disease, echo showed EF 40% with anterior apical segment hypo to akinetic, mod MR, mod AS    Saw  about 1 month ago as new pt for CKD 4- she lowered lisinopril dose to 5mg- was advised to monitor BP at home and it's been running 120-130/80-dropped off logs to her. Had labs for Dr. Deng and f/u scheduled in the next 1-2 months. Denies any NSAID use    Reports smoking 2-5 cigs/day. Admits to SOB with exertion and occas wheezing, has albuterol INH she'll use occas- was previously using anoro samples but hasn't been on med for awhile    Has continued with her diet, has cut out a lot of starches- weight is down 40lbs since Dec    Reports has cologuard kit at home she still needs to collect          Office Visit on 06/12/2023   Component Date Value Ref Range Status    Glucose 07/11/2023 82  65 - 99 mg/dL Final    BUN 07/11/2023 29 (H)  7 - 25 mg/dL Final    Creatinine 07/11/2023 2.35 (H)  0.50 - 1.05 mg/dL Final    eGFR 07/11/2023 22 (L)  > OR = 60 mL/min/1.73m2 Final    BUN/Creatinine Ratio 07/11/2023 12  6 - 22 (calc) Final    Sodium 07/11/2023 129 (L)  135 - 146 mmol/L Final    Potassium 07/11/2023 4.8  3.5 - 5.3 mmol/L Final    Chloride 07/11/2023 96 (L)  98 - 110 mmol/L Final    CO2 07/11/2023 25  20 - 32 mmol/L Final    Calcium  07/11/2023 8.8  8.6 - 10.4 mg/dL Final    BNP 07/11/2023 95  <100 pg/mL Final   Office Visit on 05/08/2023   Component Date Value Ref Range Status    Amphetamines 05/08/2023 NEGATIVE CONFIRMED  <500 ng/mL Final    Amphetamine 05/08/2023 NEGATIVE  <250 ng/mL Final    Methamphetamine 05/08/2023 NEGATIVE  <250 ng/mL Final    Amphetamines Comments 05/08/2023    Final    Barbiturates 05/08/2023 NEGATIVE  <300 ng/mL Final    Benzodiazepines 05/08/2023 NEGATIVE  <100 ng/mL Final    Cocaine Metabolites 05/08/2023 NEGATIVE  <150 ng/mL Final    Methadone 05/08/2023 NEGATIVE  <100 ng/mL Final    Opiates 05/08/2023 NEGATIVE  <100 ng/mL Final    Oxycodone 05/08/2023 NEGATIVE  <100 ng/mL Final    Phencyclidine 05/08/2023 NEGATIVE  <25 ng/mL Final    Creatinine 05/08/2023 107.9  > or = 20.0 mg/dL Final    pH 05/08/2023 6.1  4.5 - 9.0 Final    Oxidants, Urine (Tox) 05/08/2023 NEGATIVE  <200 mcg/mL Final    Notes and Comments 05/08/2023    Final    Glucose 05/08/2023 79  65 - 99 mg/dL Final    BUN 05/08/2023 19  7 - 25 mg/dL Final    Creatinine 05/08/2023 2.03 (H)  0.50 - 1.05 mg/dL Final    eGFR 05/08/2023 26 (L)  > OR = 60 mL/min/1.73m2 Final    BUN/Creatinine Ratio 05/08/2023 9  6 - 22 (calc) Final    Sodium 05/08/2023 137  135 - 146 mmol/L Final    Potassium 05/08/2023 4.3  3.5 - 5.3 mmol/L Final    Chloride 05/08/2023 103  98 - 110 mmol/L Final    CO2 05/08/2023 26  20 - 32 mmol/L Final    Calcium 05/08/2023 9.7  8.6 - 10.4 mg/dL Final    Phosphate (as Phosphorus) 05/08/2023 4.4 (H)  2.1 - 4.3 mg/dL Final    Albumin 05/08/2023 4.2  3.6 - 5.1 g/dL Final       Past Medical History:   Diagnosis Date    Arthritis     Asthma     COPD (chronic obstructive pulmonary disease)     Encounter for blood transfusion     Hypertension     Wound infection 08/2019    Right knee     Past Surgical History:   Procedure Laterality Date    ANGIOGRAM, CORONARY, WITH LEFT HEART CATHETERIZATION N/A 12/23/2022    Procedure: Angiogram, Coronary, with  Left Heart Cath;  Surgeon: Jorge Tran MD;  Location: [facility] CATH/EP LAB;  Service: Cardiology;  Laterality: N/A;     SECTION      JOINT REPLACEMENT      Knee    KNEE ARTHROPLASTY Right 2019    Procedure: ARTHROPLASTY, KNEE;  Surgeon: Delio Chung MD;  Location: [facility];  Service: Orthopedics;  Laterality: Right;  anesthesia:  general and block    REPLACEMENT OF WOUND VACUUM-ASSISTED CLOSURE DEVICE Right 2019    Procedure: REPLACEMENT, WOUND VAC;  Surgeon: Delio Chung MD;  Location: [facility] OR;  Service: Orthopedics;  Laterality: Right;    TONSILLECTOMY      VENTRICULOGRAM, LEFT  2022    Procedure: Ventriculogram, Left;  Surgeon: Jorge Tran MD;  Location: [facility] CATH/EP LAB;  Service: Cardiology;;     Family History   Problem Relation Age of Onset    Alzheimer's disease Mother        Tests to Keep You Healthy    Mammogram: ORDERED BUT NOT SCHEDULED  Colon Cancer Screening: ORDERED  Last Blood Pressure <= 139/89 (2023): Yes  Tobacco Cessation: NO      The CVD Risk score (D'Agostino, et al., 2008) failed to calculate for the following reasons:    The patient has a prior MI, stroke, CHF, or peripheral vascular disease diagnosis     Marital Status:   Alcohol History:  reports current alcohol use.  Tobacco History:  reports that she has been smoking cigarettes. She has a 7.5 pack-year smoking history. She has been exposed to tobacco smoke. She has never used smokeless tobacco.  Drug History:  reports no history of drug use.    Health Maintenance Topics with due status: Not Due       Topic Last Completion Date    TETANUS VACCINE 2019    Influenza Vaccine 2022    Hemoglobin A1c (Diabetic Prevention Screening) 2022    Lipid Panel 2023     Immunization History   Administered Date(s) Administered    COVID-19, MRNA, LN-S, PF (MODERNA FULL 0.5 ML DOSE) 2021, 2021, 2021    Influenza - High Dose - PF (65 years and older) 2019     Influenza - Quadrivalent - High Dose - PF (65 years and older) 08/20/2020, 10/25/2021, 11/08/2022    PPD Test 09/04/2019    Pneumococcal Conjugate - 13 Valent 03/06/2019    Pneumococcal Polysaccharide - 23 Valent 07/21/2020    Tdap 09/18/2019       Review of patient's allergies indicates:  No Known Allergies    Current Outpatient Medications:     albuterol (PROVENTIL/VENTOLIN HFA) 90 mcg/actuation inhaler, Inhale 2 puffs into the lungs every 6 (six) hours as needed for Wheezing. Rescue, Disp: 18 g, Rfl: 2    amLODIPine (NORVASC) 2.5 MG tablet, Take 2.5 mg by mouth every evening., Disp: , Rfl:     apixaban (ELIQUIS) 5 mg Tab, Take 1 tablet (5 mg total) by mouth 2 (two) times daily., Disp: 180 tablet, Rfl: 3    atorvastatin (LIPITOR) 10 MG tablet, Take 1 tablet (10 mg total) by mouth every evening., Disp: 90 tablet, Rfl: 3    betamethasone dipropionate 0.05 % cream, Apply topically 2 (two) times daily., Disp: 45 g, Rfl: 2    buPROPion (WELLBUTRIN SR) 150 MG TBSR 12 hr tablet, Take 1 tablet (150 mg total) by mouth 2 (two) times daily., Disp: 180 tablet, Rfl: 3    ergocalciferol (ERGOCALCIFEROL) 50,000 unit Cap, Take 50,000 Units by mouth every 7 days., Disp: , Rfl:     ferrous sulfate (FEOSOL) 325 mg (65 mg iron) Tab tablet, Take 1 tablet (325 mg total) by mouth once daily., Disp: , Rfl: 0    FLUoxetine 40 MG capsule, Take 1 capsule (40 mg total) by mouth once daily., Disp: 90 capsule, Rfl: 3    fluticasone-umeclidin-vilanter (TRELEGY ELLIPTA) 100-62.5-25 mcg DsDv, Inhale 1 puff into the lungs once daily. Rinse mouth after use and spit, Disp: 60 each, Rfl: 11    furosemide (LASIX) 20 MG tablet, Take 20 mg by mouth daily as needed for Shortness of Breath., Disp: , Rfl:     gabapentin (NEURONTIN) 300 MG capsule, Take 1 capsule (300 mg total) by mouth 3 (three) times daily., Disp: 270 capsule, Rfl: 1    HYDROcodone-acetaminophen (NORCO)  mg per tablet, Take 1 tablet by mouth every 8 (eight) hours as needed for  Pain., Disp: 90 tablet, Rfl: 0    lisinopriL 10 MG tablet, Take 0.5 tablets (5 mg total) by mouth once daily., Disp: 90 tablet, Rfl: 3    loratadine (CLARITIN) 10 mg tablet, Take 10 mg by mouth once daily., Disp: , Rfl:     metoprolol succinate (TOPROL-XL) 25 MG 24 hr tablet, Take 1 tablet (25 mg total) by mouth once daily., Disp: 90 tablet, Rfl: 3    pantoprazole (PROTONIX) 40 MG tablet, Take 1 tablet (40 mg total) by mouth 2 (two) times daily., Disp: 60 tablet, Rfl: 0    sucralfate (CARAFATE) 100 mg/mL suspension, Take 10 mLs (1 g total) by mouth 4 (four) times daily before meals and nightly., Disp: 1200 mL, Rfl: 0    traZODone (DESYREL) 50 MG tablet, Take 2 tablets (100 mg total) by mouth nightly as needed for Insomnia., Disp: 180 tablet, Rfl: 1    triamcinolone acetonide 0.1% (KENALOG) 0.1 % cream, Apply topically 2 (two) times daily., Disp: 45 g, Rfl: 2  No current facility-administered medications for this visit.    Facility-Administered Medications Ordered in Other Visits:     lidocaine (PF) 10 mg/ml (1%) injection 5 mg, 0.5 mL, Intradermal, Once, Azeem Anderson MD    Review of Systems   Constitutional:  Negative for appetite change, chills, fever and unexpected weight change (wt down 40lbs since Dec! intentional).   HENT:  Negative for sore throat and trouble swallowing.    Eyes:  Negative for visual disturbance.   Respiratory:  Positive for cough (occasional cough), shortness of breath (with exertion or out in the hot weather) and wheezing.    Cardiovascular:  Negative for chest pain, palpitations and leg swelling.   Gastrointestinal:  Negative for abdominal pain, blood in stool, constipation, diarrhea, nausea and vomiting.   Genitourinary:  Negative for difficulty urinating, dysuria and hematuria.   Musculoskeletal:  Positive for arthralgias (chronic right knee pain) and gait problem (mobility limited d/t knee pain, walks with cane).   Skin:  Negative for rash.   Neurological:  Negative for dizziness,  "syncope, speech difficulty, numbness and headaches.   Psychiatric/Behavioral:  Negative for dysphoric mood. The patient is not nervous/anxious.           Objective:      Vitals:    08/09/23 0938   BP: 132/88   Pulse: 110   SpO2: 98%   Weight: 105.1 kg (231 lb 9.6 oz)   Height: 5' 2" (1.575 m)     Physical Exam  Vitals and nursing note reviewed.   Constitutional:       General: She is not in acute distress.     Appearance: She is well-developed.      Comments: Morbidly obese white female, appears older than stated age   HENT:      Head: Normocephalic and atraumatic.      Right Ear: Tympanic membrane and ear canal normal.      Left Ear: Tympanic membrane and ear canal normal.   Neck:      Vascular: No carotid bruit.   Cardiovascular:      Rate and Rhythm: Normal rate. Rhythm irregular.      Heart sounds: Murmur heard.      No friction rub. No gallop.      Comments: Apical SH=183  Pulmonary:      Effort: Pulmonary effort is normal. No respiratory distress.      Breath sounds: Wheezing (Scattered wheezing throughout) present. No rales.   Abdominal:      General: There is no distension.      Palpations: Abdomen is soft.      Tenderness: There is no abdominal tenderness.   Musculoskeletal:      Cervical back: Neck supple.      Right knee: No effusion or erythema. Normal range of motion.      Right lower leg: No edema.      Left lower leg: No edema.   Lymphadenopathy:      Cervical: No cervical adenopathy.   Skin:     General: Skin is warm and dry.      Findings: No rash.   Neurological:      General: No focal deficit present.      Mental Status: She is alert and oriented to person, place, and time.      Gait: Gait abnormal (antalgic gait).           Assessment:       1. Hypertensive kidney disease with stage 4 chronic kidney disease    2. CKD (chronic kidney disease) stage 4, GFR 15-29 ml/min    3. New onset a-fib    4. Dyslipidemia    5. Primary osteoarthritis of both knees    6. Chronic obstructive pulmonary disease, " unspecified COPD type    7. Colon cancer screening    8. Screening mammogram, encounter for           Plan:       1. Hypertensive kidney disease with stage 4 chronic kidney disease  -BP well controlled today, continue to monitor at home.  Stressed importance of blood pressure control to reduce further progression of CKD  -     lisinopriL 10 MG tablet; Take 0.5 tablets (5 mg total) by mouth once daily.  Dispense: 90 tablet; Refill: 3    2. CKD (chronic kidney disease) stage 4, GFR 15-29 ml/min   -creatinine 2.35 with GFR 22 on most recent labs, has established care with Dr. Deng with follow-up scheduled    3. New onset a-fib   - AFib today, has appointment to see EP next week    4. Dyslipidemia   -well controlled on last labs    5. Primary osteoarthritis of both knees  -stable, admits takes gabapentin once or twice a day and not t.i.d. unless pain is really severe  -     gabapentin (NEURONTIN) 300 MG capsule; Take 1 capsule (300 mg total) by mouth 3 (three) times daily.  Dispense: 270 capsule; Refill: 1    6. Chronic obstructive pulmonary disease, unspecified COPD type  -patient advised I recommend daily maintenance inhaler, will send prescription for Trelegy, continue with albuterol p.r.n..  Stressed importance of smoking cessation  -     fluticasone-umeclidin-vilanter (TRELEGY ELLIPTA) 100-62.5-25 mcg DsDv; Inhale 1 puff into the lungs once daily. Rinse mouth after use and spit  Dispense: 60 each; Refill: 11    7. Colon cancer screening  -discussed importance of colon cancer screening, patient states she will try to collect Cologuard stool test  -     Cologuard Screening (Multitarget Stool DNA); Future; Expected date: 08/09/2023    8. Screening mammogram, encounter for  -due for mammogram  -     Mammo Digital Screening Bilat w/ [facility]; Future; Expected date: 08/09/2023      Follow up in about 3 months (around 11/9/2023) for HTN, COPD.          Counseled on age and gender appropriate medical preventative  services, including cancer screenings, immunizations, overall nutritional health, need for a consistent exercise regimen and an overall push towards maintaining a vigorous and active lifestyle.      8/9/2023 Roselyn Cote NP

## 2023-08-17 ENCOUNTER — TELEPHONE (OUTPATIENT)
Dept: FAMILY MEDICINE | Facility: CLINIC | Age: 68
End: 2023-08-17

## 2023-08-17 RX ORDER — NYSTATIN 100000 [USP'U]/ML
5 SUSPENSION ORAL 4 TIMES DAILY
Qty: 140 ML | Refills: 0 | Status: SHIPPED | OUTPATIENT
Start: 2023-08-17 | End: 2023-08-24

## 2023-08-17 NOTE — TELEPHONE ENCOUNTER
----- Message from Eve Pinzon sent at 8/17/2023 10:10 AM CDT -----  Pt states her current inhaler Trilogy has caused thrush in her mouth and would something called in asap.  [phone]  [facility]

## 2023-08-17 NOTE — TELEPHONE ENCOUNTER
----- Message from Eve Pinzon sent at 8/17/2023 10:29 AM CDT -----  Pt states she just missed a nurse callback [phone]      ----- Message -----  From: Eve Pinzon  Sent: 8/17/2023  10:15 AM CDT  To: Roselyn Cote Staff    Pt states her current inhaler Trilogy has caused thrush in her mouth and would something called in asap.  [phone]  [facility]

## 2023-08-17 NOTE — TELEPHONE ENCOUNTER
Let patient know I will send in nystatin suspension to swish and swallow 4 times a day.  Also make sure she is rinsing her mouth and spitting after Trelegy use

## 2023-08-18 ENCOUNTER — TELEPHONE (OUTPATIENT)
Dept: ELECTROPHYSIOLOGY | Facility: CLINIC | Age: 68
End: 2023-08-18
Payer: MEDICARE

## 2023-08-18 ENCOUNTER — OFFICE VISIT (OUTPATIENT)
Dept: CARDIOLOGY | Facility: CLINIC | Age: 68
End: 2023-08-18
Payer: MEDICARE

## 2023-08-18 VITALS
OXYGEN SATURATION: 92 % | WEIGHT: 236.69 LBS | DIASTOLIC BLOOD PRESSURE: 72 MMHG | SYSTOLIC BLOOD PRESSURE: 128 MMHG | HEIGHT: 62 IN | RESPIRATION RATE: 16 BRPM | BODY MASS INDEX: 43.56 KG/M2 | HEART RATE: 105 BPM

## 2023-08-18 DIAGNOSIS — Z96.651 ARTIFICIAL KNEE JOINT PRESENT, RIGHT: ICD-10-CM

## 2023-08-18 DIAGNOSIS — M79.7 FIBROMYALGIA: ICD-10-CM

## 2023-08-18 DIAGNOSIS — I50.20 HFREF (HEART FAILURE WITH REDUCED EJECTION FRACTION): ICD-10-CM

## 2023-08-18 DIAGNOSIS — J44.9 CHRONIC OBSTRUCTIVE PULMONARY DISEASE, UNSPECIFIED COPD TYPE: ICD-10-CM

## 2023-08-18 DIAGNOSIS — E66.01 CLASS 3 SEVERE OBESITY WITH BODY MASS INDEX (BMI) OF 40.0 TO 44.9 IN ADULT, UNSPECIFIED OBESITY TYPE, UNSPECIFIED WHETHER SERIOUS COMORBIDITY PRESENT: ICD-10-CM

## 2023-08-18 DIAGNOSIS — I48.0 PAROXYSMAL ATRIAL FIBRILLATION: Primary | ICD-10-CM

## 2023-08-18 DIAGNOSIS — I25.10 CORONARY ARTERY DISEASE INVOLVING NATIVE CORONARY ARTERY OF NATIVE HEART WITHOUT ANGINA PECTORIS: ICD-10-CM

## 2023-08-18 DIAGNOSIS — E78.2 MIXED HYPERLIPIDEMIA: ICD-10-CM

## 2023-08-18 DIAGNOSIS — I27.20 PULMONARY HYPERTENSION: ICD-10-CM

## 2023-08-18 DIAGNOSIS — I35.1 MODERATE AORTIC REGURGITATION: ICD-10-CM

## 2023-08-18 DIAGNOSIS — I42.8 NONISCHEMIC CARDIOMYOPATHY: ICD-10-CM

## 2023-08-18 DIAGNOSIS — I50.42 CHRONIC COMBINED SYSTOLIC AND DIASTOLIC HEART FAILURE: ICD-10-CM

## 2023-08-18 DIAGNOSIS — I35.0 MODERATE AORTIC STENOSIS: ICD-10-CM

## 2023-08-18 DIAGNOSIS — I48.3 TYPICAL ATRIAL FLUTTER: ICD-10-CM

## 2023-08-18 DIAGNOSIS — M15.9 OSTEOARTHRITIS OF MULTIPLE JOINTS, UNSPECIFIED OSTEOARTHRITIS TYPE: ICD-10-CM

## 2023-08-18 DIAGNOSIS — I10 PRIMARY HYPERTENSION: ICD-10-CM

## 2023-08-18 PROCEDURE — 93010 ELECTROCARDIOGRAM REPORT: CPT | Mod: S$GLB,,, | Performed by: SPECIALIST

## 2023-08-18 PROCEDURE — 4010F ACE/ARB THERAPY RXD/TAKEN: CPT | Mod: CPTII,S$GLB,, | Performed by: STUDENT IN AN ORGANIZED HEALTH CARE EDUCATION/TRAINING PROGRAM

## 2023-08-18 PROCEDURE — 3008F PR BODY MASS INDEX (BMI) DOCUMENTED: ICD-10-PCS | Mod: CPTII,S$GLB,, | Performed by: STUDENT IN AN ORGANIZED HEALTH CARE EDUCATION/TRAINING PROGRAM

## 2023-08-18 PROCEDURE — 4010F PR ACE/ARB THEARPY RXD/TAKEN: ICD-10-PCS | Mod: CPTII,S$GLB,, | Performed by: STUDENT IN AN ORGANIZED HEALTH CARE EDUCATION/TRAINING PROGRAM

## 2023-08-18 PROCEDURE — 3061F PR NEG MICROALBUMINURIA RESULT DOCUMENTED/REVIEW: ICD-10-PCS | Mod: CPTII,S$GLB,, | Performed by: STUDENT IN AN ORGANIZED HEALTH CARE EDUCATION/TRAINING PROGRAM

## 2023-08-18 PROCEDURE — 93005 EKG 12-LEAD: ICD-10-PCS | Mod: S$GLB,,, | Performed by: STUDENT IN AN ORGANIZED HEALTH CARE EDUCATION/TRAINING PROGRAM

## 2023-08-18 PROCEDURE — 99205 OFFICE O/P NEW HI 60 MIN: CPT | Mod: S$GLB,,, | Performed by: STUDENT IN AN ORGANIZED HEALTH CARE EDUCATION/TRAINING PROGRAM

## 2023-08-18 PROCEDURE — 1101F PR PT FALLS ASSESS DOC 0-1 FALLS W/OUT INJ PAST YR: ICD-10-PCS | Mod: CPTII,S$GLB,, | Performed by: STUDENT IN AN ORGANIZED HEALTH CARE EDUCATION/TRAINING PROGRAM

## 2023-08-18 PROCEDURE — 1126F AMNT PAIN NOTED NONE PRSNT: CPT | Mod: CPTII,S$GLB,, | Performed by: STUDENT IN AN ORGANIZED HEALTH CARE EDUCATION/TRAINING PROGRAM

## 2023-08-18 PROCEDURE — 3288F FALL RISK ASSESSMENT DOCD: CPT | Mod: CPTII,S$GLB,, | Performed by: STUDENT IN AN ORGANIZED HEALTH CARE EDUCATION/TRAINING PROGRAM

## 2023-08-18 PROCEDURE — 1101F PT FALLS ASSESS-DOCD LE1/YR: CPT | Mod: CPTII,S$GLB,, | Performed by: STUDENT IN AN ORGANIZED HEALTH CARE EDUCATION/TRAINING PROGRAM

## 2023-08-18 PROCEDURE — 93005 ELECTROCARDIOGRAM TRACING: CPT | Mod: S$GLB,,, | Performed by: STUDENT IN AN ORGANIZED HEALTH CARE EDUCATION/TRAINING PROGRAM

## 2023-08-18 PROCEDURE — 1159F PR MEDICATION LIST DOCUMENTED IN MEDICAL RECORD: ICD-10-PCS | Mod: CPTII,S$GLB,, | Performed by: STUDENT IN AN ORGANIZED HEALTH CARE EDUCATION/TRAINING PROGRAM

## 2023-08-18 PROCEDURE — 3066F NEPHROPATHY DOC TX: CPT | Mod: CPTII,S$GLB,, | Performed by: STUDENT IN AN ORGANIZED HEALTH CARE EDUCATION/TRAINING PROGRAM

## 2023-08-18 PROCEDURE — 3066F PR DOCUMENTATION OF TREATMENT FOR NEPHROPATHY: ICD-10-PCS | Mod: CPTII,S$GLB,, | Performed by: STUDENT IN AN ORGANIZED HEALTH CARE EDUCATION/TRAINING PROGRAM

## 2023-08-18 PROCEDURE — 3061F NEG MICROALBUMINURIA REV: CPT | Mod: CPTII,S$GLB,, | Performed by: STUDENT IN AN ORGANIZED HEALTH CARE EDUCATION/TRAINING PROGRAM

## 2023-08-18 PROCEDURE — 1126F PR PAIN SEVERITY QUANTIFIED, NO PAIN PRESENT: ICD-10-PCS | Mod: CPTII,S$GLB,, | Performed by: STUDENT IN AN ORGANIZED HEALTH CARE EDUCATION/TRAINING PROGRAM

## 2023-08-18 PROCEDURE — 3074F SYST BP LT 130 MM HG: CPT | Mod: CPTII,S$GLB,, | Performed by: STUDENT IN AN ORGANIZED HEALTH CARE EDUCATION/TRAINING PROGRAM

## 2023-08-18 PROCEDURE — 3288F PR FALLS RISK ASSESSMENT DOCUMENTED: ICD-10-PCS | Mod: CPTII,S$GLB,, | Performed by: STUDENT IN AN ORGANIZED HEALTH CARE EDUCATION/TRAINING PROGRAM

## 2023-08-18 PROCEDURE — 1159F MED LIST DOCD IN RCRD: CPT | Mod: CPTII,S$GLB,, | Performed by: STUDENT IN AN ORGANIZED HEALTH CARE EDUCATION/TRAINING PROGRAM

## 2023-08-18 PROCEDURE — 99205 PR OFFICE/OUTPT VISIT, NEW, LEVL V, 60-74 MIN: ICD-10-PCS | Mod: S$GLB,,, | Performed by: STUDENT IN AN ORGANIZED HEALTH CARE EDUCATION/TRAINING PROGRAM

## 2023-08-18 PROCEDURE — 3008F BODY MASS INDEX DOCD: CPT | Mod: CPTII,S$GLB,, | Performed by: STUDENT IN AN ORGANIZED HEALTH CARE EDUCATION/TRAINING PROGRAM

## 2023-08-18 PROCEDURE — 3074F PR MOST RECENT SYSTOLIC BLOOD PRESSURE < 130 MM HG: ICD-10-PCS | Mod: CPTII,S$GLB,, | Performed by: STUDENT IN AN ORGANIZED HEALTH CARE EDUCATION/TRAINING PROGRAM

## 2023-08-18 PROCEDURE — 3078F DIAST BP <80 MM HG: CPT | Mod: CPTII,S$GLB,, | Performed by: STUDENT IN AN ORGANIZED HEALTH CARE EDUCATION/TRAINING PROGRAM

## 2023-08-18 PROCEDURE — 93010 EKG 12-LEAD: ICD-10-PCS | Mod: S$GLB,,, | Performed by: SPECIALIST

## 2023-08-18 PROCEDURE — 3078F PR MOST RECENT DIASTOLIC BLOOD PRESSURE < 80 MM HG: ICD-10-PCS | Mod: CPTII,S$GLB,, | Performed by: STUDENT IN AN ORGANIZED HEALTH CARE EDUCATION/TRAINING PROGRAM

## 2023-08-18 NOTE — TELEPHONE ENCOUNTER
----- Message from Shahram Olivera MA sent at 8/18/2023 12:50 PM CDT -----  Regarding: FW: Procedure    ----- Message -----  From: Natalie Tolliver  Sent: 8/18/2023  12:45 PM CDT  To: Serafin Vivar Staff  Subject: Procedure                                        Patient need to speak to staff regarding her upcoming procedure. Please back @ [phone]. Thank you Natalie

## 2023-08-18 NOTE — PROGRESS NOTES
Electrophysiology Clinic Note    Reason for new patient visit: Evaluation and recommendations regarding paroxysmal atrial fibrillation.     PRESENTING HISTORY:     History of Present Illness:  Ms. Iris Mueller is a johnny 67-year-old woman who presents today for evaluation and recommendations regarding paroxysmal atrial fibrillation. She has a past medical history significant for paroxysmal atrial fibrillation, moderate aortic stenosis, moderate aortic regurgitation, mildly reduced systolic function with most recent LVEF 40%, pulmonary hypertension, nonobstructive coronary artery disease of the LAD and RCA, hypertension, hyperlipidemia, COPD, osteoarthritis, and obesity.      She is followed in general cardiology clinic with Dr. Tran and was recently seen on 6/12/2023. At that encounter, they discussed her atrial fibrillation. She was incidentally diagnosed with atrial fibrillation on an ECG from 6/12/2023. She had been followed for her mildly reduced systolic function, LVEF 40%, with a coronary angiogram revealing nonobstructive coronary artery disease of the LAD and RCA in 12/2022. She has a history of aortic stenosis and aortic regurgitation that continues to be monitored with serial echocardiograms. In review of her symptoms while in atrial fibrillation, she reports mild palpitations, slightly worsened shortness of breath, and exercise intolerance. She tells me her weights have remained stable on her PRN lasix. She reports increased stress taking care of her . She was started on oral anticoagulation with apixaban 5mg po BID and maintained on rate control with metoprolol succinate 25mg po daily. She denies any adverse bleeding events.     In discussion with Ms. Mueller today, she tells me that she is feeling overall quite well. She denies any episodes of dizziness, lightheadedness, syncope/presyncope, chest pain or chest discomfort, nausea or vomiting, orthopnea, or PND. She reports mild  palpitations, slightly worsened shortness of breath, and exercise intolerance as above. She reports baseline mild shortness of breath and dyspnea with exertion that is slightly worse while in atrial fibrillation. She cannot climb a flight of stairs secondary to knee, hip, and back pain.     Review of Systems:  Review of Systems   Constitutional:  Negative for activity change.        Mild exercise intolerance.    HENT:  Negative for nosebleeds, postnasal drip, rhinorrhea, sinus pressure/congestion, sneezing and sore throat.    Respiratory:  Positive for shortness of breath. Negative for apnea, cough, chest tightness and wheezing.    Cardiovascular:  Positive for palpitations. Negative for chest pain and leg swelling.   Gastrointestinal:  Negative for abdominal distention, abdominal pain, blood in stool, change in bowel habit, constipation, diarrhea, nausea, vomiting and change in bowel habit.   Genitourinary:  Negative for dysuria and hematuria.   Musculoskeletal:  Positive for arthralgias and back pain. Negative for gait problem.   Neurological:  Negative for dizziness, seizures, syncope, weakness, light-headedness, headaches, coordination difficulties and coordination difficulties.        PAST HISTORY:     Past Medical History:   Diagnosis Date    Arthritis     Asthma     COPD (chronic obstructive pulmonary disease)     Encounter for blood transfusion     Hypertension     Wound infection 2019    Right knee       Past Surgical History:   Procedure Laterality Date    ANGIOGRAM, CORONARY, WITH LEFT HEART CATHETERIZATION N/A 2022    Procedure: Angiogram, Coronary, with Left Heart Cath;  Surgeon: Jorge Tran MD;  Location: [facility] CATH/EP LAB;  Service: Cardiology;  Laterality: N/A;     SECTION      JOINT REPLACEMENT      Knee    KNEE ARTHROPLASTY Right 2019    Procedure: ARTHROPLASTY, KNEE;  Surgeon: Delio Chung MD;  Location: [facility] OR;  Service: Orthopedics;  Laterality: Right;  anesthesia:   general and block    REPLACEMENT OF WOUND VACUUM-ASSISTED CLOSURE DEVICE Right 9/12/2019    Procedure: REPLACEMENT, WOUND VAC;  Surgeon: Delio Chung MD;  Location: [facility];  Service: Orthopedics;  Laterality: Right;    TONSILLECTOMY      VENTRICULOGRAM, LEFT  12/23/2022    Procedure: Ventriculogram, Left;  Surgeon: Jorge Tran MD;  Location: [facility] CATH/EP LAB;  Service: Cardiology;;       Family History:  Family History   Problem Relation Age of Onset    Alzheimer's disease Mother        Social History:  She  reports that she has been smoking cigarettes. She has a 7.5 pack-year smoking history. She has been exposed to tobacco smoke. She has never used smokeless tobacco. She reports current alcohol use. She reports that she does not use drugs.      MEDICATIONS & ALLERGIES:     Review of patient's allergies indicates:  No Known Allergies    Current Outpatient Medications on File Prior to Visit   Medication Sig Dispense Refill    albuterol (PROVENTIL/VENTOLIN HFA) 90 mcg/actuation inhaler Inhale 2 puffs into the lungs every 6 (six) hours as needed for Wheezing. Rescue 18 g 2    amLODIPine (NORVASC) 2.5 MG tablet Take 2.5 mg by mouth every evening.      apixaban (ELIQUIS) 5 mg Tab Take 1 tablet (5 mg total) by mouth 2 (two) times daily. 180 tablet 3    atorvastatin (LIPITOR) 10 MG tablet Take 1 tablet (10 mg total) by mouth every evening. 90 tablet 3    betamethasone dipropionate 0.05 % cream Apply topically 2 (two) times daily. 45 g 2    buPROPion (WELLBUTRIN SR) 150 MG TBSR 12 hr tablet Take 1 tablet (150 mg total) by mouth 2 (two) times daily. 180 tablet 3    ergocalciferol (ERGOCALCIFEROL) 50,000 unit Cap Take 50,000 Units by mouth every 7 days.      ferrous sulfate (FEOSOL) 325 mg (65 mg iron) Tab tablet Take 1 tablet (325 mg total) by mouth once daily.  0    FLUoxetine 40 MG capsule Take 1 capsule (40 mg total) by mouth once daily. 90 capsule 3    fluticasone-umeclidin-vilanter (TRELEGY ELLIPTA)  100-62.5-25 mcg DsDv Inhale 1 puff into the lungs once daily. Rinse mouth after use and spit 60 each 11    furosemide (LASIX) 20 MG tablet Take 20 mg by mouth daily as needed for Shortness of Breath.      gabapentin (NEURONTIN) 300 MG capsule Take 1 capsule (300 mg total) by mouth 3 (three) times daily. 270 capsule 1    HYDROcodone-acetaminophen (NORCO)  mg per tablet Take 1 tablet by mouth every 8 (eight) hours as needed for Pain. 90 tablet 0    [START ON 9/8/2023] HYDROcodone-acetaminophen (NORCO)  mg per tablet Take 1 tablet by mouth every 8 (eight) hours as needed for Pain. 90 tablet 0    [START ON 10/8/2023] HYDROcodone-acetaminophen (NORCO)  mg per tablet Take 1 tablet by mouth every 8 (eight) hours as needed for Pain. 90 tablet 0    lisinopriL 10 MG tablet Take 0.5 tablets (5 mg total) by mouth once daily. 90 tablet 3    loratadine (CLARITIN) 10 mg tablet Take 10 mg by mouth once daily.      metoprolol succinate (TOPROL-XL) 25 MG 24 hr tablet Take 1 tablet (25 mg total) by mouth once daily. 90 tablet 3    nystatin (MYCOSTATIN) 100,000 unit/mL suspension Take 5 mLs (500,000 Units total) by mouth 4 (four) times daily. for 7 days 140 mL 0    pantoprazole (PROTONIX) 40 MG tablet Take 1 tablet (40 mg total) by mouth 2 (two) times daily. 60 tablet 0    sucralfate (CARAFATE) 100 mg/mL suspension Take 10 mLs (1 g total) by mouth 4 (four) times daily before meals and nightly. 1200 mL 0    traZODone (DESYREL) 50 MG tablet Take 2 tablets (100 mg total) by mouth nightly as needed for Insomnia. 180 tablet 1    triamcinolone acetonide 0.1% (KENALOG) 0.1 % cream Apply topically 2 (two) times daily. 45 g 2     Current Facility-Administered Medications on File Prior to Visit   Medication Dose Route Frequency Provider Last Rate Last Admin    lidocaine (PF) 10 mg/ml (1%) injection 5 mg  0.5 mL Intradermal Once Azeem Anderson MD            OBJECTIVE:     Vital Signs:  /72 (BP Location: Left arm,  "Patient Position: Sitting, BP Method: Medium (Manual))   Pulse 105   Resp 16   Ht 5' 2" (1.575 m)   Wt 107.3 kg (236 lb 10.6 oz)   SpO2 (!) 92%   BMI 43.29 kg/m²     Physical Exam:  Physical Exam  Constitutional:       General: She is not in acute distress.     Appearance: Normal appearance. She is obese. She is not ill-appearing or diaphoretic.      Comments: Well-appearing woman in NAD.   HENT:      Head: Normocephalic and atraumatic.      Nose: Nose normal.      Mouth/Throat:      Mouth: Mucous membranes are moist.      Pharynx: Oropharynx is clear.   Eyes:      Pupils: Pupils are equal, round, and reactive to light.   Cardiovascular:      Rate and Rhythm: Tachycardia present. Rhythm irregular.      Pulses: Normal pulses.      Heart sounds: Murmur heard.      No friction rub. No gallop.      Comments: Irregularly irregular rhythm on right radial artery palpation. III/VI CHARLETTE best appreciated at the RUSB.   Pulmonary:      Effort: Pulmonary effort is normal. No respiratory distress.      Breath sounds: Normal breath sounds. No wheezing, rhonchi or rales.   Chest:      Chest wall: No tenderness.   Abdominal:      General: There is no distension.      Palpations: Abdomen is soft.      Tenderness: There is no abdominal tenderness.   Musculoskeletal:         General: No swelling or tenderness.      Cervical back: Normal range of motion.      Right lower leg: No edema.      Left lower leg: No edema.      Comments: Prior right knee TKA.   Skin:     General: Skin is warm and dry.      Findings: No erythema, lesion or rash.   Neurological:      General: No focal deficit present.      Mental Status: She is alert and oriented to person, place, and time. Mental status is at baseline.      Motor: No weakness.      Gait: Gait normal.   Psychiatric:         Mood and Affect: Mood normal.         Behavior: Behavior normal.          Laboratory Data:  Lab Results   Component Value Date    WBC 6.5 05/03/2023    HGB 11.7 " "05/03/2023    HCT 37.7 05/03/2023    MCV 96.4 05/03/2023     05/03/2023     Lab Results   Component Value Date    GLU 82 07/11/2023     (L) 07/11/2023    K 4.8 07/11/2023    CL 96 (L) 07/11/2023    CO2 25 07/11/2023    BUN 29 (H) 07/11/2023    CREATININE 2.35 (H) 07/11/2023    CALCIUM 8.8 07/11/2023    MG 2.2 12/26/2022     No results found for: "INR", "PROTIME"    Pertinent Cardiac Data:  ECG: Atrial fibrillation with rate of 91 bpm, QRS 94 ms, QT/QTc 392/482 ms.     Exercise Nuclear Cardiac Stress Test - SPECT - 8/8/2019:    The patient reported no chest pain during the stress test.    No significant EKG changes from baseline    Myoview SPECT scan report is awaited for correlation.  1. NO FINDINGS OF PHARMACOLOGICALLY INDUCED REVERSIBILITY.  2. Mild matched decrease in tracer accumulation within the inferior/inferolateral wall and anteroseptal wall.  3. ESTIMATED EJECTION FRACTION OF 79%.    Resting 2D Transthoracic Echocardiogram - 9/14/2019:  Concentric left ventricular remodeling.  Normal left ventricular systolic function. The estimated ejection fraction is 60%  Normal LV diastolic function.  Mild aortic regurgitation.  Elevated central venous pressure (15 mm Hg).  The estimated PA systolic pressure is 32 mm Hg    Coronary Angiogram - 12/23/2022:    The estimated blood loss was <50 mL.    The pre-procedure left ventricular end diastolic pressure was 37.    The left ventricular ejection fraction was grossly normal.    Angiographically equivocal mid left circumflex stenosis with distal PAULO 3 flow    Mild disease of LAD and right coronary artery    Resting 2D Transthoracic Echocardiogram - 12/23/2022:  The estimated ejection fraction is 40%. There is a anterior apical segment that is hypo to akinetic with a wall motion abnormality  Grade II left ventricular diastolic dysfunction. Mean left atrial pressure 20 mm Hg. Mild mitral annular calcification  There are segmental left ventricular wall motion " abnormalities.  Normal right ventricular size with normal right ventricular systolic function.  Moderate left atrial enlargement.  Moderate aortic regurgitation.  There is moderate aortic valve stenosis.  Aortic valve area is 1.55 cm2; peak velocity is 2.88 m/s; mean gradient is 15 mmHg.  Moderate mitral regurgitation.  There is mild pulmonary hypertension.  Mild tricuspid regurgitation.  Elevated central venous pressure (15 mmHg).  The estimated PA systolic pressure is 47 mmHg.      ASSESSMENT & PLAN:   Ms. Iris Mueller is a johnny 67-year-old woman who presents today for evaluation and recommendations regarding paroxysmal atrial fibrillation. She has a past medical history significant for paroxysmal atrial fibrillation, moderate aortic stenosis, moderate aortic regurgitation, mildly reduced systolic function with most recent LVEF 40%, pulmonary hypertension, nonobstructive coronary artery disease of the LAD and RCA, hypertension, hyperlipidemia, COPD, osteoarthritis, and obesity.      - We discussed the pathophysiology of atrial fibrillation; specifically, we discussed paroxysmal atrial fibrillation and the concept that some patients may experience paroxysms of atrial fibrillation interrupting periods of sinus rhythm. We discussed that even a relatively low burden of atrial fibrillation continues to have an increased risk of CVA.   - She remains in symptomatic atrial fibrillation on examination today, with reported symptoms of mild palpitations, slightly worsened shortness of breath, and exercise intolerance. We discussed undergoing a MARIANO and cardioversion for restoration of sinus rhythm.  - She has never had a trial of antiarrhythmic therapy. She has a history of coronary artery disease, with mildly reduced systolic function on a recent echocardiogram, LVEF 40%. We will plan for amiodarone loading following her cardioversion.   - Her [id number]-PIAs is 5 (HFmrEF, HTN, CAD, female gender, age 65-74),  portending an annual adjusted risk of CVA of 6.7%. She remains on uninterrupted apixaban therapy with no bleeding events reported.   - We discussed the possibility of catheter ablation with pulmonary vein isolation should she continue to have symptoms and AF despite AAD therapy. She voices understanding, although she would like to attempt rhythm control with medication at this time.   - Possible underlying drivers of atrial fibrillation were addressed at this appointment, including recommendations for weight loss - now a class I recommendation. Review of laboratory data revealed acceptable TSH of 1.889. A request for sleep study may be considered at a future encounter to evaluate for possible underlying obstructive sleep apnea.      This patient will present to the EP laboratory to undergo a MARIANO and cardioversion with plans for amiodarone initiation and continued uninterrupted oral anticoagulation with apixaban. All questions and concerns were addressed at this encounter.     Signing Physician:       SHEMAR Betancourt MD  Electrophysiology Attending

## 2023-08-18 NOTE — Clinical Note
Juan Arias,   This is a [place] patient of mine who will need MARIANO and cardioversion. She is coming to [facility] for her 's appointment on the 22nd and was wondering if we could schedule her MARIANO/DCCV on that day since her niece has already taken off of work to drive them?  Thank you so much!

## 2023-08-21 ENCOUNTER — TELEPHONE (OUTPATIENT)
Dept: ELECTROPHYSIOLOGY | Facility: CLINIC | Age: 68
End: 2023-08-21
Payer: MEDICARE

## 2023-08-21 ENCOUNTER — PATIENT MESSAGE (OUTPATIENT)
Dept: ELECTROPHYSIOLOGY | Facility: CLINIC | Age: 68
End: 2023-08-21
Payer: MEDICARE

## 2023-08-21 DIAGNOSIS — I48.3 TYPICAL ATRIAL FLUTTER: ICD-10-CM

## 2023-08-21 DIAGNOSIS — I48.0 PAROXYSMAL ATRIAL FIBRILLATION: Primary | ICD-10-CM

## 2023-08-21 NOTE — TELEPHONE ENCOUNTER
----- Message from Shahram Olivera MA sent at 8/21/2023 10:30 AM CDT -----  Regarding: FW: returning a call    ----- Message -----  From: Vanesa Palacio  Sent: 8/21/2023  10:19 AM CDT  To: Serafin Vivar Staff  Subject: returning a call                                 The pt is returning a call. Vanesa Blue

## 2023-08-21 NOTE — TELEPHONE ENCOUNTER
I spoke with patient and scheduled her procedure for 8/31/2023. Procedure details reviewed and instructions will be sent via patient portal as requested.

## 2023-08-29 LAB
APTT PPP: 33 SEC (ref 23–32)
BASOPHILS # BLD AUTO: 48 CELLS/UL (ref 0–200)
BASOPHILS NFR BLD AUTO: 0.6 %
BUN SERPL-MCNC: 21 MG/DL (ref 7–25)
BUN/CREAT SERPL: 11 (CALC) (ref 6–22)
CALCIUM SERPL-MCNC: 9.5 MG/DL (ref 8.6–10.4)
CHLORIDE SERPL-SCNC: 103 MMOL/L (ref 98–110)
CO2 SERPL-SCNC: 26 MMOL/L (ref 20–32)
CREAT SERPL-MCNC: 2 MG/DL (ref 0.5–1.05)
EGFR: 27 ML/MIN/1.73M2
EOSINOPHIL # BLD AUTO: 224 CELLS/UL (ref 15–500)
EOSINOPHIL NFR BLD AUTO: 2.8 %
ERYTHROCYTE [DISTWIDTH] IN BLOOD BY AUTOMATED COUNT: 14.1 % (ref 11–15)
GLUCOSE SERPL-MCNC: 84 MG/DL (ref 65–99)
HCT VFR BLD AUTO: 39.1 % (ref 35–45)
HGB BLD-MCNC: 12.7 G/DL (ref 11.7–15.5)
INR PPP: 1.1
LYMPHOCYTES # BLD AUTO: 1040 CELLS/UL (ref 850–3900)
LYMPHOCYTES NFR BLD AUTO: 13 %
MCH RBC QN AUTO: 30.3 PG (ref 27–33)
MCHC RBC AUTO-ENTMCNC: 32.5 G/DL (ref 32–36)
MCV RBC AUTO: 93.3 FL (ref 80–100)
MONOCYTES # BLD AUTO: 544 CELLS/UL (ref 200–950)
MONOCYTES NFR BLD AUTO: 6.8 %
NEUTROPHILS # BLD AUTO: 6144 CELLS/UL (ref 1500–7800)
NEUTROPHILS NFR BLD AUTO: 76.8 %
PLATELET # BLD AUTO: 289 THOUSAND/UL (ref 140–400)
PMV BLD REES-ECKER: 9.2 FL (ref 7.5–12.5)
POTASSIUM SERPL-SCNC: 4.2 MMOL/L (ref 3.5–5.3)
PROTHROMBIN TIME: 11.7 SEC (ref 9–11.5)
RBC # BLD AUTO: 4.19 MILLION/UL (ref 3.8–5.1)
SODIUM SERPL-SCNC: 138 MMOL/L (ref 135–146)
WBC # BLD AUTO: 8 THOUSAND/UL (ref 3.8–10.8)

## 2023-08-30 ENCOUNTER — TELEPHONE (OUTPATIENT)
Dept: ELECTROPHYSIOLOGY | Facility: CLINIC | Age: 68
End: 2023-08-30
Payer: MEDICARE

## 2023-08-30 NOTE — TELEPHONE ENCOUNTER
Spoke to Ms. Mueller    CONFIRMED procedure arrival time of 1000    Reiterated instructions including:  -Directions to check in desk  -NPO after midnight night prior to procedure  -Pre-procedure LABS done, no alerts  -Confirmed no fever, infection, or shortness of breath    Patient verbalized understanding of above and appreciated the call.

## 2023-08-31 ENCOUNTER — HOSPITAL ENCOUNTER (OUTPATIENT)
Dept: CARDIOLOGY | Facility: HOSPITAL | Age: 68
Discharge: HOME | End: 2023-08-31
Attending: STUDENT IN AN ORGANIZED HEALTH CARE EDUCATION/TRAINING PROGRAM
Payer: MEDICARE

## 2023-08-31 ENCOUNTER — ANESTHESIA (OUTPATIENT)
Dept: MEDSURG UNIT | Facility: HOSPITAL | Age: 68
End: 2023-08-31
Payer: MEDICARE

## 2023-08-31 ENCOUNTER — HOSPITAL ENCOUNTER (OUTPATIENT)
Facility: HOSPITAL | Age: 68
Discharge: HOME | End: 2023-08-31
Attending: STUDENT IN AN ORGANIZED HEALTH CARE EDUCATION/TRAINING PROGRAM | Admitting: STUDENT IN AN ORGANIZED HEALTH CARE EDUCATION/TRAINING PROGRAM
Payer: MEDICARE

## 2023-08-31 ENCOUNTER — ANESTHESIA EVENT (OUTPATIENT)
Dept: MEDSURG UNIT | Facility: HOSPITAL | Age: 68
End: 2023-08-31
Payer: MEDICARE

## 2023-08-31 VITALS
RESPIRATION RATE: 18 BRPM | TEMPERATURE: 99 F | DIASTOLIC BLOOD PRESSURE: 89 MMHG | WEIGHT: 230 LBS | OXYGEN SATURATION: 99 % | HEART RATE: 58 BPM | BODY MASS INDEX: 42.33 KG/M2 | SYSTOLIC BLOOD PRESSURE: 158 MMHG | HEIGHT: 62 IN

## 2023-08-31 VITALS
SYSTOLIC BLOOD PRESSURE: 145 MMHG | DIASTOLIC BLOOD PRESSURE: 86 MMHG | HEIGHT: 62 IN | BODY MASS INDEX: 42.33 KG/M2 | WEIGHT: 230 LBS

## 2023-08-31 DIAGNOSIS — I48.91 ATRIAL FIBRILLATION: ICD-10-CM

## 2023-08-31 DIAGNOSIS — I48.3 TYPICAL ATRIAL FLUTTER: ICD-10-CM

## 2023-08-31 DIAGNOSIS — I48.0 PAROXYSMAL ATRIAL FIBRILLATION: ICD-10-CM

## 2023-08-31 DIAGNOSIS — I48.0 PAROXYSMAL ATRIAL FIBRILLATION: Primary | ICD-10-CM

## 2023-08-31 LAB
ASCENDING AORTA: 3.7 CM
BSA FOR ECHO PROCEDURE: 2.14 M2
SINUS: 3.2 CM
STJ: 2.4 CM

## 2023-08-31 PROCEDURE — 93325 DOPPLER ECHO COLOR FLOW MAPG: CPT | Mod: 26,,, | Performed by: INTERNAL MEDICINE

## 2023-08-31 PROCEDURE — 25000003 PHARM REV CODE 250: Performed by: NURSE ANESTHETIST, CERTIFIED REGISTERED

## 2023-08-31 PROCEDURE — 93320 DOPPLER ECHO COMPLETE: CPT | Mod: 26,,, | Performed by: INTERNAL MEDICINE

## 2023-08-31 PROCEDURE — D9220A PRA ANESTHESIA: Mod: ANES,,, | Performed by: ANESTHESIOLOGY

## 2023-08-31 PROCEDURE — 93320 TRANSESOPHAGEAL ECHO (TEE) (CUPID ONLY): ICD-10-PCS | Mod: 26,,, | Performed by: INTERNAL MEDICINE

## 2023-08-31 PROCEDURE — 63600175 PHARM REV CODE 636 W HCPCS: Performed by: NURSE ANESTHETIST, CERTIFIED REGISTERED

## 2023-08-31 PROCEDURE — 93010 ELECTROCARDIOGRAM REPORT: CPT | Mod: ,,, | Performed by: INTERNAL MEDICINE

## 2023-08-31 PROCEDURE — 93325 DOPPLER ECHO COLOR FLOW MAPG: CPT

## 2023-08-31 PROCEDURE — D9220A PRA ANESTHESIA: ICD-10-PCS | Mod: ANES,,, | Performed by: ANESTHESIOLOGY

## 2023-08-31 PROCEDURE — 93312 TRANSESOPHAGEAL ECHO (TEE) (CUPID ONLY): ICD-10-PCS | Mod: 26,,, | Performed by: INTERNAL MEDICINE

## 2023-08-31 PROCEDURE — 93325 TRANSESOPHAGEAL ECHO (TEE) (CUPID ONLY): ICD-10-PCS | Mod: 26,,, | Performed by: INTERNAL MEDICINE

## 2023-08-31 PROCEDURE — D9220A PRA ANESTHESIA: Mod: CRNA,,, | Performed by: NURSE ANESTHETIST, CERTIFIED REGISTERED

## 2023-08-31 PROCEDURE — 93005 ELECTROCARDIOGRAM TRACING: CPT | Mod: 59

## 2023-08-31 PROCEDURE — 93010 EKG 12-LEAD: ICD-10-PCS | Mod: ,,, | Performed by: INTERNAL MEDICINE

## 2023-08-31 PROCEDURE — D9220A PRA ANESTHESIA: ICD-10-PCS | Mod: CRNA,,, | Performed by: NURSE ANESTHETIST, CERTIFIED REGISTERED

## 2023-08-31 PROCEDURE — 93312 ECHO TRANSESOPHAGEAL: CPT | Mod: 26,,, | Performed by: INTERNAL MEDICINE

## 2023-08-31 PROCEDURE — 92960 CARDIOVERSION ELECTRIC EXT: CPT | Mod: ,,, | Performed by: STUDENT IN AN ORGANIZED HEALTH CARE EDUCATION/TRAINING PROGRAM

## 2023-08-31 PROCEDURE — 37000009 HC ANESTHESIA EA ADD 15 MINS: Performed by: STUDENT IN AN ORGANIZED HEALTH CARE EDUCATION/TRAINING PROGRAM

## 2023-08-31 PROCEDURE — 92960 PR CARDIOVERSION, ELECTIVE;EXTERN: ICD-10-PCS | Mod: ,,, | Performed by: STUDENT IN AN ORGANIZED HEALTH CARE EDUCATION/TRAINING PROGRAM

## 2023-08-31 PROCEDURE — 92960 CARDIOVERSION ELECTRIC EXT: CPT | Performed by: STUDENT IN AN ORGANIZED HEALTH CARE EDUCATION/TRAINING PROGRAM

## 2023-08-31 PROCEDURE — 37000008 HC ANESTHESIA 1ST 15 MINUTES: Performed by: STUDENT IN AN ORGANIZED HEALTH CARE EDUCATION/TRAINING PROGRAM

## 2023-08-31 RX ORDER — LIDOCAINE HYDROCHLORIDE 20 MG/ML
INJECTION INTRAVENOUS
Status: DISCONTINUED | OUTPATIENT
Start: 2023-08-31 | End: 2023-08-31

## 2023-08-31 RX ORDER — SODIUM CHLORIDE 0.9 % (FLUSH) 0.9 %
3 SYRINGE (ML) INJECTION
Status: DISCONTINUED | OUTPATIENT
Start: 2023-08-31 | End: 2023-08-31 | Stop reason: HOSPADM

## 2023-08-31 RX ORDER — PROPOFOL 10 MG/ML
VIAL (ML) INTRAVENOUS
Status: DISCONTINUED | OUTPATIENT
Start: 2023-08-31 | End: 2023-08-31

## 2023-08-31 RX ORDER — AMIODARONE HYDROCHLORIDE 200 MG/1
TABLET ORAL
Qty: 90 TABLET | Refills: 3 | Status: SHIPPED | OUTPATIENT
Start: 2023-08-31 | End: 2023-11-15

## 2023-08-31 RX ADMIN — LIDOCAINE HYDROCHLORIDE 50 MG: 20 INJECTION INTRAVENOUS at 11:08

## 2023-08-31 RX ADMIN — SODIUM CHLORIDE: 0.9 INJECTION, SOLUTION INTRAVENOUS at 11:08

## 2023-08-31 RX ADMIN — PROPOFOL 85 MCG/KG/MIN: 10 INJECTION, EMULSION INTRAVENOUS at 11:08

## 2023-08-31 RX ADMIN — PROPOFOL 50 MG: 10 INJECTION, EMULSION INTRAVENOUS at 11:08

## 2023-08-31 NOTE — ANESTHESIA PREPROCEDURE EVALUATION
08/31/2023  Iris Mueller is a 67 y.o., female.      Pre-op Assessment    I have reviewed the Patient Summary Reports.     I have reviewed the Nursing Notes. I have reviewed the NPO Status.   I have reviewed the Medications.     Review of Systems  Anesthesia Hx:  No problems with previous Anesthesia  History of prior surgery of interest to airway management or planning: Denies Family Hx of Anesthesia complications.   Denies Personal Hx of Anesthesia complications.   Hematology/Oncology:  Hematology Normal   Oncology Normal     EENT/Dental:EENT/Dental Normal   Cardiovascular:   Exercise tolerance: good Hypertension CAD asymptomatic Dysrhythmias  CHF MARIANO:  ? The estimated ejection fraction is 40%. There is a anterior apical segment that is hypo to akinetic with a wall motion abnormality  ? Grade II left ventricular diastolic dysfunction. Mean left atrial pressure 20 mm Hg. Mild mitral annular calcification  ? There are segmental left ventricular wall motion abnormalities.  ? Normal right ventricular size with normal right ventricular systolic function.  ? Moderate left atrial enlargement.  ? Moderate aortic regurgitation.  ? There is moderate aortic valve stenosis.  ? Aortic valve area is 1.55 cm2; peak velocity is 2.88 m/s; mean gradient is 15 mmHg.  ? Moderate mitral regurgitation.  ? There is mild pulmonary hypertension.  ? Mild tricuspid regurgitation.  ? Elevated central venous pressure (15 mmHg).  ? The estimated PA systolic pressure is 47 mmHg.   Pulmonary:   COPD    Renal/:   Chronic Renal Disease, CKD    Hepatic/GI:  Hepatic/GI Normal    Musculoskeletal:   Arthritis     Neurological:   Chronic Pain Syndrome   Endocrine:  Endocrine Normal  Morbid Obesity / BMI > 40  Dermatological:  Skin Normal    Psych:   depression          Physical Exam  General: Well nourished, Cooperative, Alert and  Oriented    Airway:  Mallampati: II   Mouth Opening: Normal  TM Distance: Normal  Tongue: Normal  Neck ROM: Normal ROM    Dental:  Intact        Anesthesia Plan  Type of Anesthesia, risks & benefits discussed:    Anesthesia Type: Gen Natural Airway  Intra-op Monitoring Plan: Standard ASA Monitors  Induction:  IV  Informed Consent: Informed consent signed with the Patient and all parties understand the risks and agree with anesthesia plan.  All questions answered.   ASA Score: 3  Day of Surgery Review of History & Physical: H&P Update referred to the surgeon/provider.    Ready For Surgery From Anesthesia Perspective.     .

## 2023-08-31 NOTE — H&P
[facility], [address]  H&P      Iris Mueller  YOB: 1955  Medical Record Number:  [id number]  Attending Physician:  PJ Betancourt MD   Date of Admission: 8/31/2023       Hospital Day:  0  Current Principal Problem:  atrial fibrillation      History     Cc: afib    HPI  Pleasant 67 years old female with h/o paroxysmal atrial fibrillation, moderate aortic stenosis, moderate aortic regurgitation, mildly reduced systolic function with most recent LVEF 40%, pulmonary hypertension, nonobstructive coronary artery disease of the LAD and RCA, hypertension, hyperlipidemia, COPD, osteoarthritis, and obesity.       She is followed in general cardiology clinic with Dr. Tran and was recently seen on 6/12/2023. At that encounter, they discussed her atrial fibrillation. She was incidentally diagnosed with atrial fibrillation on an ECG from 6/12/2023. She had been followed for her mildly reduced systolic function, LVEF 40%, with a coronary angiogram revealing nonobstructive coronary artery disease of the LAD and RCA in 12/2022. She has a history of aortic stenosis and aortic regurgitation that continues to be monitored with serial echocardiograms. In review of her symptoms while in atrial fibrillation, she reports mild palpitations, slightly worsened shortness of breath, and exercise intolerance. She tells me her weights have remained stable on her PRN lasix. She reports increased stress taking care of her . She was started on oral anticoagulation with apixaban 5mg po BID and maintained on rate control with metoprolol succinate 25mg po daily. She denies any adverse bleeding events.     Today, she is here with her niece and is in good spirits with no active symptoms. Last food intake was last night and took eliquis this am.       Anticoagulant/antiplatelets: Eliquis  ECG: afib     Dysphagia or odynophagia:  no  Liver Disease, esophageal disease, or known varices:  no  Upper GI  Bleeding: no  Sleep Apnea:  no  Prior neck surgery or radiation:  no  History of anesthetic difficulties:  no  Family history of anesthetic difficulties:  no  Last oral intake: last evening   Able to move neck in all directions: yes  Platelet count: 289  INR: 1.1        Medications - Outpatient  Prior to Admission medications    Medication Sig Start Date End Date Taking? Authorizing Provider   amLODIPine (NORVASC) 2.5 MG tablet Take 2.5 mg by mouth every evening. 7/27/23  Yes Provider, Historical   apixaban (ELIQUIS) 5 mg Tab Take 1 tablet (5 mg total) by mouth 2 (two) times daily. 6/12/23 6/11/24 Yes Jorge Tran MD   atorvastatin (LIPITOR) 10 MG tablet Take 1 tablet (10 mg total) by mouth every evening. 5/8/23  Yes Roselyn Cote NP   buPROPion (WELLBUTRIN SR) 150 MG TBSR 12 hr tablet Take 1 tablet (150 mg total) by mouth 2 (two) times daily. 6/29/23 6/28/24 Yes Roselyn Cote NP   ferrous sulfate (FEOSOL) 325 mg (65 mg iron) Tab tablet Take 1 tablet (325 mg total) by mouth once daily. 10/23/19  Yes Roselyn Cote NP   FLUoxetine 40 MG capsule Take 1 capsule (40 mg total) by mouth once daily. 5/8/23  Yes Roselyn Cote NP   fluticasone-umeclidin-vilanter (TRELEGY ELLIPTA) 100-62.5-25 mcg DsDv Inhale 1 puff into the lungs once daily. Rinse mouth after use and spit 8/9/23  Yes Roselyn Cote NP   gabapentin (NEURONTIN) 300 MG capsule Take 1 capsule (300 mg total) by mouth 3 (three) times daily. 8/9/23  Yes Roselyn Cote NP   HYDROcodone-acetaminophen (NORCO)  mg per tablet Take 1 tablet by mouth every 8 (eight) hours as needed for Pain. 9/8/23  Yes Elkin You MD   metoprolol succinate (TOPROL-XL) 25 MG 24 hr tablet Take 1 tablet (25 mg total) by mouth once daily. 6/12/23 6/11/24 Yes Jorge Tran MD   traZODone (DESYREL) 50 MG tablet Take 2 tablets (100 mg total) by mouth nightly as needed for Insomnia. 7/7/23  Yes Elkin You MD   albuterol  (PROVENTIL/VENTOLIN HFA) 90 mcg/actuation inhaler Inhale 2 puffs into the lungs every 6 (six) hours as needed for Wheezing. Rescue 5/8/23   Roselyn Cote NP   betamethasone dipropionate 0.05 % cream Apply topically 2 (two) times daily. 3/15/23   Roselyn Cote NP   ergocalciferol (ERGOCALCIFEROL) 50,000 unit Cap Take 50,000 Units by mouth every 7 days. 7/13/23   Provider, Historical   furosemide (LASIX) 20 MG tablet Take 20 mg by mouth daily as needed for Shortness of Breath.    Provider, Historical   HYDROcodone-acetaminophen (NORCO)  mg per tablet Take 1 tablet by mouth every 8 (eight) hours as needed for Pain. 8/9/23   Elkin You MD   HYDROcodone-acetaminophen (NORCO)  mg per tablet Take 1 tablet by mouth every 8 (eight) hours as needed for Pain. 10/8/23   Elkin You MD   lisinopriL 10 MG tablet Take 0.5 tablets (5 mg total) by mouth once daily. 8/9/23   Roselyn Cote NP   loratadine (CLARITIN) 10 mg tablet Take 10 mg by mouth once daily.    Provider, Historical   pantoprazole (PROTONIX) 40 MG tablet Take 1 tablet (40 mg total) by mouth 2 (two) times daily. 12/26/22   Anitha Nelson MD   sucralfate (CARAFATE) 100 mg/mL suspension Take 10 mLs (1 g total) by mouth 4 (four) times daily before meals and nightly. 12/26/22   Anitha Nelson MD   triamcinolone acetonide 0.1% (KENALOG) 0.1 % cream Apply topically 2 (two) times daily. 7/21/20   Roselyn Cote NP         Medications - Current  Scheduled Meds:  Continuous Infusions:  PRN Meds:.      Allergies  Review of patient's allergies indicates:  No Known Allergies      Past Medical History  Past Medical History:   Diagnosis Date    Arthritis     Asthma     COPD (chronic obstructive pulmonary disease)     Encounter for blood transfusion     Hypertension     Wound infection 08/2019    Right knee         Past Surgical History  Past Surgical History:   Procedure Laterality Date    ANGIOGRAM, CORONARY, WITH LEFT HEART  CATHETERIZATION N/A 2022    Procedure: Angiogram, Coronary, with Left Heart Cath;  Surgeon: Jorge Tran MD;  Location: [facility] CATH/EP LAB;  Service: Cardiology;  Laterality: N/A;     SECTION      JOINT REPLACEMENT      Knee    KNEE ARTHROPLASTY Right 2019    Procedure: ARTHROPLASTY, KNEE;  Surgeon: Delio Chung MD;  Location: [facility] OR;  Service: Orthopedics;  Laterality: Right;  anesthesia:  general and block    REPLACEMENT OF WOUND VACUUM-ASSISTED CLOSURE DEVICE Right 2019    Procedure: REPLACEMENT, WOUND VAC;  Surgeon: Delio Chung MD;  Location: [facility] OR;  Service: Orthopedics;  Laterality: Right;    TONSILLECTOMY      VENTRICULOGRAM, LEFT  2022    Procedure: Ventriculogram, Left;  Surgeon: Jorge Tran MD;  Location: [facility] CATH/EP LAB;  Service: Cardiology;;         Social History  Social History     Socioeconomic History    Marital status:    Tobacco Use    Smoking status: Every Day     Current packs/day: 0.25     Average packs/day: 0.3 packs/day for 30.0 years (7.5 ttl pk-yrs)     Types: Cigarettes     Passive exposure: Current    Smokeless tobacco: Never   Substance and Sexual Activity    Alcohol use: Yes     Comment: RARELY    Drug use: Never    Sexual activity: Not Currently     Partners: Male     Social Determinants of Health     Financial Resource Strain: Unknown (2022)    Overall Financial Resource Strain (CARDIA)     Difficulty of Paying Living Expenses: Patient refused   Food Insecurity: Unknown (2022)    Hunger Vital Sign     Worried About Running Out of Food in the Last Year: Patient refused     Ran Out of Food in the Last Year: Patient refused   Transportation Needs: Unknown (2022)    PRAPARE - Transportation     Lack of Transportation (Medical): Patient refused     Lack of Transportation (Non-Medical): Patient refused   Physical Activity: Unknown (2022)    Exercise Vital Sign     Days of Exercise per Week: Patient refused      "Minutes of Exercise per Session: Patient refused   Stress: Unknown (12/23/2022)    [employer] - Occupational Stress Questionnaire     Feeling of Stress : Patient refused   Social Connections: Unknown (12/23/2022)    Social Connection and Isolation Panel [NHANES]     Frequency of Communication with Friends and Family: Patient refused     Frequency of Social Gatherings with Friends and Family: Patient refused     Attends [employer] Services: Patient refused     Active Member of Clubs or Organizations: Patient refused     Attends Club or Organization Meetings: Patient refused     Marital Status: Patient refused   Housing Stability: Unknown (12/23/2022)    Housing Stability Vital Sign     Unable to Pay for Housing in the Last Year: Patient refused     Unstable Housing in the Last Year: Patient refused         ROS  10 point ROS performed and negative except as stated in HPI     Physical Examination         Vital Signs             24 Hour VS Range       No intake or output data in the 24 hours ending 08/31/23 1040      Physical Exam:   Constitutional: no acute distress  HEENT: NCAT, EOMI, no scleral icterus  Cardiovascular: Regular rate and rhythm  Pulmonary: Normal respiratory effort   Abdomen: nontender, non-distended   Neuro: alert and oriented, no focal deficits  Extremities: warm, no edema   MSK: no deformities  Integument: intact, no rashes         Data       Recent Labs   Lab 08/28/23  1352   WBC 8.0   HGB 12.7   HCT 39.1           Recent Labs   Lab 08/28/23  1352   INR 1.1        Recent Labs   Lab 08/28/23  1352      K 4.2      CO2 26   BUN 21   CREATININE 2.00*   CALCIUM 9.5        No results for input(s): "PROT", "ALBUMIN", "BILITOT", "ALKPHOS", "AST", "ALT" in the last 168 hours.     No results for input(s): "TROPONINI" in the last 168 hours.     BNP (pg/mL)   Date Value   07/11/2023 95   12/25/2022 631 (H)   12/24/2022 928 (H)   08/23/2019 199 (H)       No " "results for input(s): "LABBLOO" in the last 168 hours.         Assessment & Plan     #Atrial Fibrillation  -MARIANO/DCCV today  -got diagnosed with afib in 6/2023  -on eliquis  -consents done    TTE 12/2022  The estimated ejection fraction is 40%. There is a anterior apical segment that is hypo to akinetic with a wall motion abnormality  Grade II left ventricular diastolic dysfunction. Mean left atrial pressure 20 mm Hg. Mild mitral annular calcification  There are segmental left ventricular wall motion abnormalities.  Normal right ventricular size with normal right ventricular systolic function.  Moderate left atrial enlargement.  Moderate aortic regurgitation.  There is moderate aortic valve stenosis.  Aortic valve area is 1.55 cm2; peak velocity is 2.88 m/s; mean gradient is 15 mmHg.  Moderate mitral regurgitation.  There is mild pulmonary hypertension.  Mild tricuspid regurgitation.  Elevated central venous pressure (15 mmHg).  The estimated PA systolic pressure is 47 mmHg.    -No absolute contraindications of esophageal stricture, tumor, perforation, laceration,or diverticulum and/or active GI bleed  -The risks, benefits & alternatives of the procedure were explained to the patient.   -The risks of transesophageal echo include but are not limited to:  Dental trauma, esophageal trauma/perforation, bleeding, laryngospasm/brochospasm, aspiration, sore throat/hoarseness, & dislodgement of the endotracheal tube/nasogastric tube (where applicable).    -The risks of moderate sedation include hypotension, respiratory depression, arrhythmias, bronchospasm, & death.    -Informed consent was obtained. The patient is agreeable to proceed with the procedure and all questions and concerns addressed    Maciel Elizondo MD  [facility]   Cardiovascular Disease PGY-V    "

## 2023-08-31 NOTE — NURSING
Pt transferred from procedure via stretcher.  Vss.  Post op orders and assessment initiated.  Family called to bs.  Pt in no acute distress and verbalizes no complaints.

## 2023-08-31 NOTE — DISCHARGE INSTRUCTIONS
Medications:  -Continue to take your home medications as listed on your medication list after you are discharged.    New Medications:  -Amiodarone Take 1 tablet (200mg) twice daily for 14 days, then decrease to 1 tablet (200mg) once daily  -Please call the clinic if there are any questions about your new medication    Diet  -You may resume oral intake after you are discharged, as long you have no swallowing difficulties.    Because you have received sedation for this procedure:  -Limit activity for the remainder of the day.  -Do not smoke for at least 6 hours and until you are fully awake and alert.  -Do not drink alcoholic beverage for 24 hours.  -Do not drive for 24 hours.  -Defer important decision making until the following day.     Go to the Emergency Department if you develop:   -Bleeding  -Weakness or numbness  -Visual, gait or speech disturbance  -New chest pain, palpitations, shortness of breath, rapid heart beat, or fainting  -Fever    Follow up:  -EKG in 1 week.  -Dr. Betancourt  in 1 month in [place].     Any need to reschedule or cancel procedures, or any questions regarding your procedures should be addressed directly with the Arrhythmia Department Nurses at the following phone number: [phone].

## 2023-08-31 NOTE — ANESTHESIA POSTPROCEDURE EVALUATION
Anesthesia Post Evaluation    Patient: Iris Mueller    Procedure(s) Performed: Procedure(s) (LRB):  Cardioversion or Defibrillation (N/A)  Transesophageal echo (MARIANO) intra-procedure log documentation (N/A)    Final Anesthesia Type: general      Patient location during evaluation: PACU  Patient participation: Yes- Able to Participate  Level of consciousness: awake and alert and oriented  Post-procedure vital signs: reviewed and stable  Pain management: adequate  Airway patency: patent    PONV status at discharge: No PONV  Anesthetic complications: no      Cardiovascular status: blood pressure returned to baseline  Respiratory status: unassisted, room air and spontaneous ventilation  Hydration status: euvolemic  Follow-up not needed.          Vitals Value Taken Time   /74 08/31/23 1248   Temp 37 08/31/23 1251   Pulse 65 08/31/23 1251   Resp 17 08/31/23 1251   SpO2 97 % 08/31/23 1251   Vitals shown include unvalidated device data.      No case tracking events are documented in the log.      Pain/Ad Score: No data recorded

## 2023-08-31 NOTE — TRANSFER OF CARE
"Anesthesia Transfer of Care Note    Patient: Iris Mueller    Procedure(s) Performed: Procedure(s) (LRB):  Cardioversion or Defibrillation (N/A)  Transesophageal echo (MARIANO) intra-procedure log documentation (N/A)    Patient location: PACU    Anesthesia Type: general    Transport from OR: Transported from OR on room air with adequate spontaneous ventilation    Post pain: adequate analgesia    Post assessment: tolerated procedure well and no apparent anesthetic complications    Post vital signs: stable    Level of consciousness: awake and alert    Nausea/Vomiting: no nausea/vomiting    Complications: none    Transfer of care protocol was followed      Last vitals:   Visit Vitals  BP (!) 156/78   Pulse 89   Temp 37.2 °C (98.9 °F) (Temporal)   Resp 20   Ht 5' 2" (1.575 m)   Wt 104.3 kg (230 lb)   SpO2 97%   Breastfeeding No   BMI 42.07 kg/m²     "

## 2023-08-31 NOTE — Clinical Note
Ongoing SW/CM Assessment/Plan of Care Note     See SW/CM flowsheets for goals and other objective data.    Patient/Family discharge goal (s):  Goal #1: Communication facilitated  Goal #2: Discharge to other institution(s) arranged  Goal #3: Transportation arranged or issues addressed    PT Recommendation:  Recommendation for Discharge: PT: Sub-acute nursing home    OT Recommendation:  Recommendations for Discharge: OT: Sub-acute nursing home    SLP Recommendation:       Disposition:   [facility]    Progress note:   Case discussed in OFTs and with Dr. Trujillo. Patient medically ready to d/c today. She is requiring assist of two at times and has limited mobility. For this reason [facility] is recommended over return home with caregivers. Writer discussed this with patient's daughter Ann who is in agreement. She called off [facility] yesterday knowing the patient would require strengthening before being able to return home.     Writer scheduled a 1:30PM [facility] ambulance for transfer due to patient's lack of mobility, activity intolerance, and hip pain. Writer updated Ann, accepting facility, bedside nurse, [facility], and Dr. Trujillo. All in agreement. Bedside nurse updated patient on d/c time so as not to increase anxiety.     Ann aware that Dr. Trujillo did not order anti depressant here during this hospitalization. Writer encouraged her to discuss this at patient's next PCP appointment. Ann expressed thanks and agreement.     SW remains available to assist should needs arise.          The defib pads were placed on the patient's back and chest x2 sets.

## 2023-08-31 NOTE — NURSING
Discharge instructions and medlist given.  Patient and daughter verbalized understanding.  Off unit via wheelchair with daughter pushing and will drive her home.

## 2023-09-01 ENCOUNTER — TELEPHONE (OUTPATIENT)
Dept: ELECTROPHYSIOLOGY | Facility: CLINIC | Age: 68
End: 2023-09-01
Payer: MEDICARE

## 2023-09-01 RX ORDER — NYSTATIN 100000 [USP'U]/ML
5 SUSPENSION ORAL 4 TIMES DAILY
Qty: 150 ML | Refills: 0 | Status: SHIPPED | OUTPATIENT
Start: 2023-09-01 | End: 2023-09-11

## 2023-09-01 NOTE — TELEPHONE ENCOUNTER
Left message on voice mail, verbalizing that we are sending a prescription for her thrush. States that prescription will be sent to her local pharmacy.

## 2023-09-01 NOTE — TELEPHONE ENCOUNTER
----- Message from Patricia Alston sent at 9/1/2023  9:00 AM CDT -----  Patient called and stated that she would like have Roselyn call in the medication that she gets for thrush in her mouth, please call into [facility] on [address]. If any questions please give her a call back at [phone] (patient did not know the name of the medication)

## 2023-09-01 NOTE — TELEPHONE ENCOUNTER
Spoke with patient. Advised that she can take the metoprolol and amiodarone together. She can monitor her BP and let us know if there are significant changes. She verbalized understanding.

## 2023-09-01 NOTE — TELEPHONE ENCOUNTER
----- Message from Shonna Richardson sent at 9/1/2023  9:38 AM CDT -----  Regarding: medication  Pt just had a procedure and has questions about taking amiodarone and metoprolol.  She was told it would make her bp drop if taken together and needs to know how she should take it.  She can be reached at [phone].    Thank you

## 2023-09-01 NOTE — DISCHARGE SUMMARY
[facility] - Short Stay Cardiac Unit  Cardiac Electrophysiology  Discharge Summary      Patient Name: Iris Mueller  MRN: [id number]  Admission Date: 8/31/2023  Hospital Length of Stay: 0 days  Discharge Date and Time: 8/31/2023  2:55 PM  Attending Physician: Ghazala Betancourt MD  Discharging Provider: Francheska De Luna NP  Primary Care Physician: Elkin You MD    HPI: Ms. Mueller is a  67-year-old woman wiht a past medical history significant for paroxysmal atrial fibrillation, moderate aortic stenosis, moderate aortic regurgitation, mildly reduced systolic function with most recent LVEF 40%, pulmonary hypertension, nonobstructive coronary artery disease of the LAD and RCA, hypertension, hyperlipidemia, COPD, osteoarthritis, and obesity.       8/18/23 During her last office visit with Dr. Betancourt, discussed the pathophysiology of atrial fibrillation. She remains in symptomatic atrial fibrillation on examination today, with reported symptoms of mild palpitations, slightly worsened shortness of breath, and exercise intolerance. We discussed undergoing a MARIANO and cardioversion for restoration of sinus rhythm.  She has never had a trial of antiarrhythmic therapy. She has a history of coronary artery disease, with mildly reduced systolic function on a recent echocardiogram, LVEF 40%. We will plan for amiodarone loading following her cardioversion.   [id number] is 5 (HFmrEF, HTN, CAD, female gender, age 65-74), portending an annual adjusted risk of CVA of 6.7%. She remains on uninterrupted apixaban therapy with no bleeding events reported.   Discussed the possibility of catheter ablation with pulmonary vein isolation should she continue to have symptoms and AF despite AAD therapy. She voices understanding, although she would like to attempt rhythm control with medication at this time.   Possible underlying drivers of atrial fibrillation were addressed at this appointment, including recommendations for  weight loss - now a class I recommendation. Review of laboratory data revealed acceptable TSH of 1.889. A request for sleep study may be considered at a future encounter to evaluate for possible underlying obstructive sleep apnea.      This patient will present to the EP laboratory to undergo a MARIANO and cardioversion with plans for amiodarone initiation and continued uninterrupted oral anticoagulation with apixaban. All questions and concerns were addressed at this encounter.     Ms. Mueller presented today in AF for MARIANO/DCCV with Dr. Betancourt.    Procedure(s) (LRB):  Cardioversion or Defibrillation (N/A)  Transesophageal echo (MARIANO) intra-procedure log documentation (N/A)     Indwelling Lines/Drains at time of discharge:  Lines/Drains/Airways     None      Hospital Course:.Ms. Mueller presented in AF and currently on eliquis. She underwent MARIANO without evidence of WENCESLAO thrombus. Proceeded with DCCV 400 J x 1 shock, converting from AF to sinus rhythm. She tolerated the procedure without any acute complications. Post-DCCV ECG revealed sinus rhythm (see chart). Plan to continue all home medications including eliquis. Start amiodarone 200 mg BID x 14 days, then decrease to 200 mg daily. Instructed to return in 1 week for follow up ECG and in 4 weeks for clinic follow up with Dr. Betancourt in [place].   Ms. Mueller was assessed at bedside prior to discharge. She reported feeling well and denied chest discomfort, shortness of breath, palpitations, lightheadedness, or any other acute symptoms. Discharge instructions were discussed with patient and neice and all questions were answered. She was discharged home in stable condition.     Goals of Care Treatment Preferences:  Code Status: Full Code    Consults: MARIANO    Significant Diagnostic Studies: MARIANO    MARIANO to evaluate the LA/WENCESLAO prior to DCCV.    Left Ventricle: The left ventricle is normal in size. Normal wall thickness. Normal wall motion. There is low normal systolic  function with a visually estimated ejection fraction of 50 - 55%.    Left Atrium: Left atrium is dilated. The left atrial appendage appears normal. Appendage velocity is reduced at less than 40 cm/sec. There is no thrombus in the cavity or appendage. Light spontaneous echo contrast visualized in the left atrial cavity and appendage.    Right Ventricle: Normal right ventricular cavity size. Wall thickness is normal. Right ventricle wall motion is ormal. Systolic function is normal.    Right Atrium: Right atrium is dilated.    Aortic Valve: The aortic valve is a trileaflet valve. There is moderate aortic valve sclerosis. Mildly restricted motion. Aortic valve stenosis is present, but unable to grade severity of AS. There is mild aortic regurgitation.    Mitral Valve: Mild mitral annular calcification. There is mild regurgitation.    Tricuspid Valve: There is mild regurgitation.    Aorta: Grade 2 atherosclerosis of the descending aorta.    Final Active Diagnoses:    Diagnosis Date Noted POA    PRINCIPAL PROBLEM:  Paroxysmal atrial fibrillation [I48.0] 06/12/2023 Yes      Problems Resolved During this Admission:     Discharged Condition: stable    Disposition: Home or Self Care    Follow Up:   Follow-up Information     EKG, [facility] Follow up in 1 week(s).    Why: Post cardioversion           PJ Betancourt MD Follow up in 1 month(s).    Specialty: Electrophysiology  Why: Post cardioversion. Schedule with Dr. Betancourt in [place]  Contact information:  [address] [facility] [id number] [phone]                       Patient Instructions:      ACCEPT - Ambulatory referral/consult to Interventional Cardiology   Standing Status: Future   Referral Priority: Routine Referral Type: Consultation   Referral Reason: Specialty Services Required   Requested Specialty: Interventional Cardiology   Number of Visits Requested: 1     No driving until:   Order Comments: No driving or operating heavy machinery for  24-48 hours after your procedure because you received sedation.     Other restrictions (specify):   Order Comments: Medications:  -Continue to take your home medications as listed on your medication list after you are discharged.    New Medications:  -Amiodarone Take 1 tablet (200mg) twice daily for 14 days, then decrease to 1 tablet (200mg) once daily  -Please call the clinic if there are any questions about your new medication    Diet  -You may resume oral intake after you are discharged, as long you have no swallowing difficulties.    Because you have received sedation for this procedure:  -Limit activity for the remainder of the day.  -Do not smoke for at least 6 hours and until you are fully awake and alert.  -Do not drink alcoholic beverage for 24 hours.  -Do not drive for 24 hours.  -Defer important decision making until the following day.     Go to the Emergency Department if you develop:   -Bleeding  -Weakness or numbness  -Visual, gait or speech disturbance  -New chest pain, palpitations, shortness of breath, rapid heart beat, or fainting  -Fever    Follow up:  -EKG in 1 week.  -Dr. Betancourt  in 1 month in [place].     Any need to reschedule or cancel procedures, or any questions regarding your procedures should be addressed directly with the Arrhythmia Department Nurses at the following phone number: [phone].     Notify your health care provider if you experience any of the following:  increased confusion or weakness     Notify your health care provider if you experience any of the following:  persistent dizziness, light-headedness, or visual disturbances     Notify your health care provider if you experience any of the following:  worsening rash     Notify your health care provider if you experience any of the following:  difficulty breathing or increased cough     Notify your health care provider if you experience any of the following:  redness, tenderness, or signs of infection (pain, swelling,  redness, odor or green/yellow discharge around incision site)     Notify your health care provider if you experience any of the following:  severe persistent headache     Notify your health care provider if you experience any of the following:  severe uncontrolled pain     Notify your health care provider if you experience any of the following:  persistent nausea and vomiting or diarrhea     Notify your health care provider if you experience any of the following:  temperature >100.4     Medications:  Reconciled Home Medications:      Medication List      START taking these medications    amiodarone 200 MG Tab  Commonly known as: PACERONE  Take 1 tablet (200mg) twice daily for 14 days, then decrease to 1 tablet (200mg) once daily        CONTINUE taking these medications    albuterol 90 mcg/actuation inhaler  Commonly known as: PROVENTIL/VENTOLIN HFA  Inhale 2 puffs into the lungs every 6 (six) hours as needed for Wheezing. Rescue     amLODIPine 2.5 MG tablet  Commonly known as: NORVASC  Take 2.5 mg by mouth every evening.     atorvastatin 10 MG tablet  Commonly known as: LIPITOR  Take 1 tablet (10 mg total) by mouth every evening.     betamethasone dipropionate 0.05 % cream  Apply topically 2 (two) times daily.     buPROPion 150 MG TBSR 12 hr tablet  Commonly known as: WELLBUTRIN SR  Take 1 tablet (150 mg total) by mouth 2 (two) times daily.     ELIQUIS 5 mg Tab  Generic drug: apixaban  Take 1 tablet (5 mg total) by mouth 2 (two) times daily.     ergocalciferol 50,000 unit Cap  Commonly known as: ERGOCALCIFEROL  Take 50,000 Units by mouth every 7 days.     ferrous sulfate 325 mg (65 mg iron) Tab tablet  Commonly known as: FEOSOL  Take 1 tablet (325 mg total) by mouth once daily.     FLUoxetine 40 MG capsule  Take 1 capsule (40 mg total) by mouth once daily.     fluticasone-umeclidin-vilanter 100-62.5-25 mcg Dsdv  Commonly known as: TRELEGY ELLIPTA  Inhale 1 puff into the lungs once daily. Rinse mouth after use and  spit     furosemide 20 MG tablet  Commonly known as: LASIX  Take 20 mg by mouth daily as needed for Shortness of Breath.     gabapentin 300 MG capsule  Commonly known as: NEURONTIN  Take 1 capsule (300 mg total) by mouth 3 (three) times daily.     * HYDROcodone-acetaminophen  mg per tablet  Commonly known as: NORCO  Take 1 tablet by mouth every 8 (eight) hours as needed for Pain.     * HYDROcodone-acetaminophen  mg per tablet  Commonly known as: NORCO  Take 1 tablet by mouth every 8 (eight) hours as needed for Pain.  Start taking on: September 8, 2023     * HYDROcodone-acetaminophen  mg per tablet  Commonly known as: NORCO  Take 1 tablet by mouth every 8 (eight) hours as needed for Pain.  Start taking on: October 8, 2023     lisinopriL 10 MG tablet  Take 0.5 tablets (5 mg total) by mouth once daily.     loratadine 10 mg tablet  Commonly known as: CLARITIN  Take 10 mg by mouth once daily.     metoprolol succinate 25 MG 24 hr tablet  Commonly known as: TOPROL-XL  Take 1 tablet (25 mg total) by mouth once daily.     pantoprazole 40 MG tablet  Commonly known as: PROTONIX  Take 1 tablet (40 mg total) by mouth 2 (two) times daily.     sucralfate 100 mg/mL suspension  Commonly known as: CARAFATE  Take 10 mLs (1 g total) by mouth 4 (four) times daily before meals and nightly.     traZODone 50 MG tablet  Commonly known as: DESYREL  Take 2 tablets (100 mg total) by mouth nightly as needed for Insomnia.     triamcinolone acetonide 0.1% 0.1 % cream  Commonly known as: KENALOG  Apply topically 2 (two) times daily.         * This list has 3 medication(s) that are the same as other medications prescribed for you. Read the directions carefully, and ask your doctor or other care provider to review them with you.              Plan:  -Start amiodarone 200 mg BID x 14 days, then decrease to 200 mg daily.  -Continue all other home medications  -Follow up EKG in 1 week  -Follow up with Dr. Betancourt in 1 month    Time  spent on the discharge of patient: 15 minutes    Francheska De Luna NP  Cardiac Electrophysiology  [facility] -Arrhythmia    Attending: Ghazala Corrales MD

## 2023-09-01 NOTE — TELEPHONE ENCOUNTER
Spoke with pt in regards to recent message sent. Pt states that she has thrush in her mouth. Pt states that tongue has a burning sensation and the back of her throat is feeling raw/irritating. Pt would like for something to be call in.       Printed for Dr. You

## 2023-09-05 ENCOUNTER — TELEPHONE (OUTPATIENT)
Dept: FAMILY MEDICINE | Facility: CLINIC | Age: 68
End: 2023-09-05

## 2023-09-05 ENCOUNTER — TELEPHONE (OUTPATIENT)
Dept: CARDIOLOGY | Facility: CLINIC | Age: 68
End: 2023-09-05
Payer: MEDICARE

## 2023-09-05 NOTE — TELEPHONE ENCOUNTER
Had a cardioversion done on Thursday of last week.    IV site  is red and swollen from forearm to elbow.  Hurts to use the whole arm.  Patient is going to call the cardiologist that did the test and report the symptoms.  Patient agrees to report this to them and call back if unable to reach them.    Roselyn mcarthur.

## 2023-09-05 NOTE — TELEPHONE ENCOUNTER
----- Message from Eve Pinzon sent at 9/5/2023 11:39 AM CDT -----  Pt had a procedure at [facility] on Thursday, believes she has a blown vein. Pt cant open her arm or fingers. [phone]

## 2023-09-05 NOTE — TELEPHONE ENCOUNTER
----- Message from Karsten Montes sent at 9/5/2023  3:02 PM CDT -----  Type: Needs Medical Advice  Who Called:  pt  Symptoms (please be specific):  pt said she need to speak to the nurse--her phone was breaking up so I did not get why--please call and advise  Best Call Back Number: [phone] (home)     Additional Information: thank you

## 2023-09-08 RX ORDER — HYDROCODONE BITARTRATE AND ACETAMINOPHEN 7.5; 325 MG/1; MG/1
1 TABLET ORAL EVERY 8 HOURS PRN
Qty: 90 TABLET | Refills: 0 | OUTPATIENT
Start: 2023-09-08 | End: 2024-01-12

## 2023-09-08 NOTE — TELEPHONE ENCOUNTER
The patient's prescription has been approved and sent to   [id number] - [place], [place] - [address] [facility] [address]  [address] [address]  Phone: [phone] Fax: [phone]

## 2023-09-08 NOTE — TELEPHONE ENCOUNTER
----- Message from Patricia Alston sent at 9/8/2023 10:46 AM CDT -----  Patient called and stated that pharmacy advised her that they are out of stock of the mg of her pain medication but they have 7.5 mg and she has percocet. She would like to have the doctor send in another prescription to [facility] on [address]. If any questions please give her a call at [phone]

## 2023-09-27 ENCOUNTER — TELEPHONE (OUTPATIENT)
Dept: CARDIOLOGY | Facility: CLINIC | Age: 68
End: 2023-09-27
Payer: MEDICARE

## 2023-09-27 NOTE — TELEPHONE ENCOUNTER
----- Message from Shahram Olivera MA sent at 9/5/2023 11:55 AM CDT -----  Yes she need to see serafin  ----- Message -----  From: Tala Rivas  Sent: 9/5/2023  11:33 AM CDT  To: Shahram Olivera MA    I do not have any openings for her. Is she needing to see Serafin or regular cardiologist?  ----- Message -----  From: Shahram Olivera MA  Sent: 9/1/2023   2:29 PM CDT  To: Tala Rivas MA    Can you see if they have and appt for her she trying to get seen in a week

## 2023-10-06 DIAGNOSIS — M79.7 FIBROMYALGIA: ICD-10-CM

## 2023-10-06 RX ORDER — HYDROCODONE BITARTRATE AND ACETAMINOPHEN 7.5; 325 MG/1; MG/1
1 TABLET ORAL EVERY 8 HOURS PRN
Qty: 90 TABLET | Refills: 0 | OUTPATIENT
Start: 2023-10-08 | End: 2024-01-12

## 2023-10-06 NOTE — TELEPHONE ENCOUNTER
----- Message from Latoya Cook LPN sent at 10/6/2023 10:20 AM CDT -----    ----- Message -----  From: Carolyne Khoury  Sent: 10/6/2023  10:16 AM CDT  To: Elkin You Staff    Pt needs refill on pain medication   [facility]   [phone]

## 2023-10-06 NOTE — TELEPHONE ENCOUNTER
Last OV 8/9/2023  Upcoming OV 11/9/2023  UDS 5/8/2023    Per  last dispensed 09/08/2023    Rx order set up.

## 2023-10-06 NOTE — TELEPHONE ENCOUNTER
Spoke with Pt who states she saw that her Norco 10mg was to be filled on the 8th but the pharmacy only has 7.5mg.     Ok to fill?

## 2023-10-09 NOTE — TELEPHONE ENCOUNTER
----- Message from Eve Pinzon sent at 8/17/2023 10:55 AM CDT -----  Pt missed a callback from Sir[facility]. [phone]  ----- Message -----  From: Eve Pinzon  Sent: 8/17/2023  10:15 AM CDT  To: Roselyn Cote Staff    Pt states her current inhaler Trilogy has caused thrush in her mouth and would something called in asap.  [phone]  [facility]        Writer reviewed chart & patient had labs completed & result letters were sent to patient. Nothing else needed at this time.

## 2023-10-18 DIAGNOSIS — I48.0 PAROXYSMAL ATRIAL FIBRILLATION: ICD-10-CM

## 2023-10-18 RX ORDER — AMLODIPINE BESYLATE 2.5 MG/1
2.5 TABLET ORAL NIGHTLY
Qty: 90 TABLET | Refills: 3 | Status: SHIPPED | OUTPATIENT
Start: 2023-10-18

## 2023-10-18 NOTE — TELEPHONE ENCOUNTER
----- Message from Kyler May sent at 10/18/2023  2:11 PM CDT -----  Contact: pt at [phone]  Type:  RX Refill Request  pt  Who Called:  pt  Refill or New Rx:   RX Name and Strength:  refill  How is the patient currently taking it? (ex. 1XDay):  apixaban (ELIQUIS) 5 mg Tab  Is this a 30 day or 90 day RX:  180  Preferred Pharmacy with phone number:    [facility] [address] [place] [address]  Phone: [phone] Fax: [phone]  Local or Mail Order:  Local  Ordering Provider:  Marc Lundberg Call Back Number:  [phone]  Additional Information:  pt is calling the office to get her apixaban (ELIQUIS) 5 mg Tab refilled due to her being out.

## 2023-11-09 ENCOUNTER — OFFICE VISIT (OUTPATIENT)
Dept: FAMILY MEDICINE | Facility: CLINIC | Age: 68
End: 2023-11-09
Payer: MEDICARE

## 2023-11-09 ENCOUNTER — TELEPHONE (OUTPATIENT)
Dept: FAMILY MEDICINE | Facility: CLINIC | Age: 68
End: 2023-11-09

## 2023-11-09 VITALS
DIASTOLIC BLOOD PRESSURE: 88 MMHG | BODY MASS INDEX: 43.43 KG/M2 | SYSTOLIC BLOOD PRESSURE: 136 MMHG | HEIGHT: 61 IN | HEART RATE: 94 BPM | OXYGEN SATURATION: 97 % | WEIGHT: 230 LBS

## 2023-11-09 DIAGNOSIS — F17.210 NICOTINE DEPENDENCE, CIGARETTES, UNCOMPLICATED: ICD-10-CM

## 2023-11-09 DIAGNOSIS — M17.0 PRIMARY OSTEOARTHRITIS OF BOTH KNEES: ICD-10-CM

## 2023-11-09 DIAGNOSIS — N18.4 HYPERTENSIVE KIDNEY DISEASE WITH STAGE 4 CHRONIC KIDNEY DISEASE: Primary | ICD-10-CM

## 2023-11-09 DIAGNOSIS — Z12.31 SCREENING MAMMOGRAM, ENCOUNTER FOR: ICD-10-CM

## 2023-11-09 DIAGNOSIS — N18.4 CKD (CHRONIC KIDNEY DISEASE) STAGE 4, GFR 15-29 ML/MIN: ICD-10-CM

## 2023-11-09 DIAGNOSIS — Z23 NEED FOR VACCINATION: ICD-10-CM

## 2023-11-09 DIAGNOSIS — E78.5 DYSLIPIDEMIA: ICD-10-CM

## 2023-11-09 DIAGNOSIS — I48.0 PAF (PAROXYSMAL ATRIAL FIBRILLATION): ICD-10-CM

## 2023-11-09 DIAGNOSIS — I12.9 HYPERTENSIVE KIDNEY DISEASE WITH STAGE 4 CHRONIC KIDNEY DISEASE: Primary | ICD-10-CM

## 2023-11-09 DIAGNOSIS — Z79.899 ENCOUNTER FOR LONG-TERM (CURRENT) USE OF OTHER MEDICATIONS: ICD-10-CM

## 2023-11-09 DIAGNOSIS — Z13.820 SCREENING FOR OSTEOPOROSIS: ICD-10-CM

## 2023-11-09 DIAGNOSIS — Z51.81 ENCOUNTER FOR THERAPEUTIC DRUG MONITORING: ICD-10-CM

## 2023-11-09 DIAGNOSIS — Z78.0 MENOPAUSE: ICD-10-CM

## 2023-11-09 PROCEDURE — 1159F PR MEDICATION LIST DOCUMENTED IN MEDICAL RECORD: ICD-10-PCS | Mod: CPTII,S$GLB,, | Performed by: NURSE PRACTITIONER

## 2023-11-09 PROCEDURE — 1101F PT FALLS ASSESS-DOCD LE1/YR: CPT | Mod: CPTII,S$GLB,, | Performed by: NURSE PRACTITIONER

## 2023-11-09 PROCEDURE — 1160F RVW MEDS BY RX/DR IN RCRD: CPT | Mod: CPTII,S$GLB,, | Performed by: NURSE PRACTITIONER

## 2023-11-09 PROCEDURE — G0008 FLU VACCINE - QUADRIVALENT - ADJUVANTED: ICD-10-PCS | Mod: S$GLB,,, | Performed by: NURSE PRACTITIONER

## 2023-11-09 PROCEDURE — 1159F MED LIST DOCD IN RCRD: CPT | Mod: CPTII,S$GLB,, | Performed by: NURSE PRACTITIONER

## 2023-11-09 PROCEDURE — G0008 ADMIN INFLUENZA VIRUS VAC: HCPCS | Mod: S$GLB,,, | Performed by: NURSE PRACTITIONER

## 2023-11-09 PROCEDURE — 3288F FALL RISK ASSESSMENT DOCD: CPT | Mod: CPTII,S$GLB,, | Performed by: NURSE PRACTITIONER

## 2023-11-09 PROCEDURE — 3079F PR MOST RECENT DIASTOLIC BLOOD PRESSURE 80-89 MM HG: ICD-10-PCS | Mod: CPTII,S$GLB,, | Performed by: NURSE PRACTITIONER

## 2023-11-09 PROCEDURE — 3075F SYST BP GE 130 - 139MM HG: CPT | Mod: CPTII,S$GLB,, | Performed by: NURSE PRACTITIONER

## 2023-11-09 PROCEDURE — 3008F BODY MASS INDEX DOCD: CPT | Mod: CPTII,S$GLB,, | Performed by: NURSE PRACTITIONER

## 2023-11-09 PROCEDURE — 3288F PR FALLS RISK ASSESSMENT DOCUMENTED: ICD-10-PCS | Mod: CPTII,S$GLB,, | Performed by: NURSE PRACTITIONER

## 2023-11-09 PROCEDURE — 4010F PR ACE/ARB THEARPY RXD/TAKEN: ICD-10-PCS | Mod: CPTII,S$GLB,, | Performed by: NURSE PRACTITIONER

## 2023-11-09 PROCEDURE — 99214 PR OFFICE/OUTPT VISIT, EST, LEVL IV, 30-39 MIN: ICD-10-PCS | Mod: S$GLB,,, | Performed by: NURSE PRACTITIONER

## 2023-11-09 PROCEDURE — 3008F PR BODY MASS INDEX (BMI) DOCUMENTED: ICD-10-PCS | Mod: CPTII,S$GLB,, | Performed by: NURSE PRACTITIONER

## 2023-11-09 PROCEDURE — 3079F DIAST BP 80-89 MM HG: CPT | Mod: CPTII,S$GLB,, | Performed by: NURSE PRACTITIONER

## 2023-11-09 PROCEDURE — 1160F PR REVIEW ALL MEDS BY PRESCRIBER/CLIN PHARMACIST DOCUMENTED: ICD-10-PCS | Mod: CPTII,S$GLB,, | Performed by: NURSE PRACTITIONER

## 2023-11-09 PROCEDURE — 3061F NEG MICROALBUMINURIA REV: CPT | Mod: CPTII,S$GLB,, | Performed by: NURSE PRACTITIONER

## 2023-11-09 PROCEDURE — 1101F PR PT FALLS ASSESS DOC 0-1 FALLS W/OUT INJ PAST YR: ICD-10-PCS | Mod: CPTII,S$GLB,, | Performed by: NURSE PRACTITIONER

## 2023-11-09 PROCEDURE — 3075F PR MOST RECENT SYSTOLIC BLOOD PRESS GE 130-139MM HG: ICD-10-PCS | Mod: CPTII,S$GLB,, | Performed by: NURSE PRACTITIONER

## 2023-11-09 PROCEDURE — 99214 OFFICE O/P EST MOD 30 MIN: CPT | Mod: S$GLB,,, | Performed by: NURSE PRACTITIONER

## 2023-11-09 PROCEDURE — 4010F ACE/ARB THERAPY RXD/TAKEN: CPT | Mod: CPTII,S$GLB,, | Performed by: NURSE PRACTITIONER

## 2023-11-09 PROCEDURE — 3061F PR NEG MICROALBUMINURIA RESULT DOCUMENTED/REVIEW: ICD-10-PCS | Mod: CPTII,S$GLB,, | Performed by: NURSE PRACTITIONER

## 2023-11-09 PROCEDURE — 90694 VACC AIIV4 NO PRSRV 0.5ML IM: CPT | Mod: S$GLB,,, | Performed by: NURSE PRACTITIONER

## 2023-11-09 PROCEDURE — 3066F NEPHROPATHY DOC TX: CPT | Mod: CPTII,S$GLB,, | Performed by: NURSE PRACTITIONER

## 2023-11-09 PROCEDURE — 3066F PR DOCUMENTATION OF TREATMENT FOR NEPHROPATHY: ICD-10-PCS | Mod: CPTII,S$GLB,, | Performed by: NURSE PRACTITIONER

## 2023-11-09 PROCEDURE — 90694 FLU VACCINE - QUADRIVALENT - ADJUVANTED: ICD-10-PCS | Mod: S$GLB,,, | Performed by: NURSE PRACTITIONER

## 2023-11-09 NOTE — PROGRESS NOTES
SUBJECTIVE:    Patient ID: Iris Mueller is a 68 y.o. female.    Chief Complaint: Follow-up (No bottles//Pt is here for a check up and medication refills//Pt has a Cologuard kit at home//order for mammo and dexa scan today//Pt would like a flu vaccine//JAIME )    Pt here for regular f/u- HTN/COPD/OA. Pt here with her  today    Pt reports overall has been feeling okay.    Reports since last visit had cardioversion at [facility] with Dr. Betancourt in August- has f/u with her and Dr. Tran next week. Reports hasn't felt any palpitations since cardioversion though really was never aware of afib previously. Coronary angio Dec 2022 during hospital stay for CHF showed mild LAD/RCA disease, echo showed EF 40% with anterior apical segment hypo to akinetic, mod MR, mod AS    Has seen Dr. Deng once for CKD 4 and has f/u appt coming up-    Reports smoking 2-5 cigs/day. Admits to SOB with exertion- reports can't afford trelegy inhaler so only using albuterol occasionally     Has continued with her diet, has cut out a lot of starches- weight is down 40lbs since Dec    Reports has cologuard kit at home she still needs to collect          Hospital Outpatient Visit on 08/31/2023   Component Date Value Ref Range Status    BSA 08/31/2023 2.14  m2 Final    Sinus 08/31/2023 3.2  cm Final    STJ 08/31/2023 2.4  cm Final    Ascending aorta 08/31/2023 3.7  cm Final   Orders Only on 08/21/2023   Component Date Value Ref Range Status    aPTT 08/28/2023 33 (H)  23 - 32 sec Final    Glucose 08/28/2023 84  65 - 99 mg/dL Final    BUN 08/28/2023 21  7 - 25 mg/dL Final    Creatinine 08/28/2023 2.00 (H)  0.50 - 1.05 mg/dL Final    eGFR 08/28/2023 27 (L)  > OR = 60 mL/min/1.73m2 Final    BUN/Creatinine Ratio 08/28/2023 11  6 - 22 (calc) Final    Sodium 08/28/2023 138  135 - 146 mmol/L Final    Potassium 08/28/2023 4.2  3.5 - 5.3 mmol/L Final    Chloride 08/28/2023 103  98 - 110 mmol/L Final    CO2 08/28/2023 26  20 - 32 mmol/L  Final    Calcium 08/28/2023 9.5  8.6 - 10.4 mg/dL Final    WBC 08/28/2023 8.0  3.8 - 10.8 Thousand/uL Final    RBC 08/28/2023 4.19  3.80 - 5.10 Million/uL Final    Hemoglobin 08/28/2023 12.7  11.7 - 15.5 g/dL Final    Hematocrit 08/28/2023 39.1  35.0 - 45.0 % Final    MCV 08/28/2023 93.3  80.0 - 100.0 fL Final    MCH 08/28/2023 30.3  27.0 - 33.0 pg Final    MCHC 08/28/2023 32.5  32.0 - 36.0 g/dL Final    RDW 08/28/2023 14.1  11.0 - 15.0 % Final    Platelets 08/28/2023 289  140 - 400 Thousand/uL Final    MPV 08/28/2023 9.2  7.5 - 12.5 fL Final    Neutrophils, Abs 08/28/2023 6,144  1,500 - 7,800 cells/uL Final    Lymph # 08/28/2023 1,040  850 - 3,900 cells/uL Final    Mono # 08/28/2023 544  200 - 950 cells/uL Final    Eos # 08/28/2023 224  15 - 500 cells/uL Final    Baso # 08/28/2023 48  0 - 200 cells/uL Final    Neutrophils Relative 08/28/2023 76.8  % Final    Lymph % 08/28/2023 13.0  % Final    Mono % 08/28/2023 6.8  % Final    Eosinophil % 08/28/2023 2.8  % Final    Basophil % 08/28/2023 0.6  % Final    INR 08/28/2023 1.1   Final    Prothrombin Time 08/28/2023 11.7 (H)  9.0 - 11.5 sec Final   Office Visit on 06/12/2023   Component Date Value Ref Range Status    Glucose 07/11/2023 82  65 - 99 mg/dL Final    BUN 07/11/2023 29 (H)  7 - 25 mg/dL Final    Creatinine 07/11/2023 2.35 (H)  0.50 - 1.05 mg/dL Final    eGFR 07/11/2023 22 (L)  > OR = 60 mL/min/1.73m2 Final    BUN/Creatinine Ratio 07/11/2023 12  6 - 22 (calc) Final    Sodium 07/11/2023 129 (L)  135 - 146 mmol/L Final    Potassium 07/11/2023 4.8  3.5 - 5.3 mmol/L Final    Chloride 07/11/2023 96 (L)  98 - 110 mmol/L Final    CO2 07/11/2023 25  20 - 32 mmol/L Final    Calcium 07/11/2023 8.8  8.6 - 10.4 mg/dL Final    BNP 07/11/2023 95  <100 pg/mL Final       Past Medical History:   Diagnosis Date    Arthritis     Asthma     COPD (chronic obstructive pulmonary disease)     Encounter for blood transfusion     Hypertension     Wound infection 08/2019    Right knee      Past Surgical History:   Procedure Laterality Date    ANGIOGRAM, CORONARY, WITH LEFT HEART CATHETERIZATION N/A 2022    Procedure: Angiogram, Coronary, with Left Heart Cath;  Surgeon: Jorge Tran MD;  Location: [facility] CATH/EP LAB;  Service: Cardiology;  Laterality: N/A;     SECTION      ECHOCARDIOGRAM,TRANSESOPHAGEAL N/A 2023    Procedure: Transesophageal echo (MARIANO) intra-procedure log documentation;  Surgeon: Provider, [facility];  Location: [facility] EP LAB;  Service: Cardiology;  Laterality: N/A;    JOINT REPLACEMENT      Knee    KNEE ARTHROPLASTY Right 2019    Procedure: ARTHROPLASTY, KNEE;  Surgeon: Delio Chung MD;  Location: [facility] OR;  Service: Orthopedics;  Laterality: Right;  anesthesia:  general and block    REPLACEMENT OF WOUND VACUUM-ASSISTED CLOSURE DEVICE Right 2019    Procedure: REPLACEMENT, WOUND VAC;  Surgeon: eDlio Chung MD;  Location: [facility] OR;  Service: Orthopedics;  Laterality: Right;    TONSILLECTOMY      TREATMENT OF CARDIAC ARRHYTHMIA N/A 2023    Procedure: Cardioversion or Defibrillation;  Surgeon: PJ Betancourt MD;  Location: [facility] EP LAB;  Service: Cardiology;  Laterality: N/A;  AF, MARIANO, DCCV, MAC, EH, 3 Prep    VENTRICULOGRAM, LEFT  2022    Procedure: Ventriculogram, Left;  Surgeon: Jorge Tran MD;  Location: [facility] CATH/EP LAB;  Service: Cardiology;;     Family History   Problem Relation Age of Onset    Alzheimer's disease Mother        Tests to Keep You Healthy    Mammogram: ORDERED BUT NOT SCHEDULED  Colon Cancer Screening: ORDERED  Last Blood Pressure <= 139/89 (2023): Yes  Tobacco Cessation: NO      The CVD Risk score (D'Agostino, et al., 2008) failed to calculate for the following reasons:    The patient has a prior MI, stroke, CHF, or peripheral vascular disease diagnosis     Marital Status:   Alcohol History:  reports current alcohol use.  Tobacco History:  reports that she has been smoking cigarettes. She  started smoking about 48 years ago. She has a 23.5 pack-year smoking history. She has been exposed to tobacco smoke. She has never used smokeless tobacco.  Drug History:  reports no history of drug use.    Health Maintenance Topics with due status: Not Due       Topic Last Completion Date    TETANUS VACCINE 09/18/2019    Hemoglobin A1c (Diabetic Prevention Screening) 12/23/2022    Lipid Panel 05/03/2023     Immunization History   Administered Date(s) Administered    COVID-19, MRNA, LN-S, PF (MODERNA FULL 0.5 ML DOSE) 02/26/2021, 03/26/2021, 12/14/2021    Influenza (FLUAD) - Quadrivalent - Adjuvanted - PF *Preferred* (65+) 11/09/2023    Influenza - High Dose - PF (65 years and older) 09/18/2019    Influenza - Quadrivalent - High Dose - PF (65 years and older) 08/20/2020, 10/25/2021, 11/08/2022    PPD Test 09/04/2019    Pneumococcal Conjugate - 13 Valent 03/06/2019    Pneumococcal Polysaccharide - 23 Valent 07/21/2020    Tdap 09/18/2019       Review of patient's allergies indicates:  No Known Allergies    Current Outpatient Medications:     albuterol (PROVENTIL/VENTOLIN HFA) 90 mcg/actuation inhaler, Inhale 2 puffs into the lungs every 6 (six) hours as needed for Wheezing. Rescue, Disp: 18 g, Rfl: 2    amiodarone (PACERONE) 200 MG Tab, Take 1 tablet (200mg) twice daily for 14 days, then decrease to 1 tablet (200mg) once daily, Disp: 90 tablet, Rfl: 3    amLODIPine (NORVASC) 2.5 MG tablet, Take 1 tablet (2.5 mg total) by mouth every evening., Disp: 90 tablet, Rfl: 3    apixaban (ELIQUIS) 5 mg Tab, Take 1 tablet (5 mg total) by mouth 2 (two) times daily., Disp: 180 tablet, Rfl: 3    atorvastatin (LIPITOR) 10 MG tablet, Take 1 tablet (10 mg total) by mouth every evening., Disp: 90 tablet, Rfl: 3    betamethasone dipropionate 0.05 % cream, Apply topically 2 (two) times daily., Disp: 45 g, Rfl: 2    buPROPion (WELLBUTRIN SR) 150 MG TBSR 12 hr tablet, Take 1 tablet (150 mg total) by mouth 2 (two) times daily., Disp: 180  tablet, Rfl: 3    ergocalciferol (ERGOCALCIFEROL) 50,000 unit Cap, Take 50,000 Units by mouth every 7 days., Disp: , Rfl:     ferrous sulfate (FEOSOL) 325 mg (65 mg iron) Tab tablet, Take 1 tablet (325 mg total) by mouth once daily., Disp: , Rfl: 0    FLUoxetine 40 MG capsule, Take 1 capsule (40 mg total) by mouth once daily., Disp: 90 capsule, Rfl: 3    fluticasone-umeclidin-vilanter (TRELEGY ELLIPTA) 100-62.5-25 mcg DsDv, Inhale 1 puff into the lungs once daily. Rinse mouth after use and spit (Patient not taking: Reported on 11/9/2023), Disp: 60 each, Rfl: 11    furosemide (LASIX) 20 MG tablet, Take 20 mg by mouth daily as needed for Shortness of Breath., Disp: , Rfl:     gabapentin (NEURONTIN) 300 MG capsule, Take 1 capsule (300 mg total) by mouth 3 (three) times daily., Disp: 270 capsule, Rfl: 1    HYDROcodone-acetaminophen (NORCO)  mg per tablet, Take 1 tablet by mouth every 8 (eight) hours as needed for Pain., Disp: 90 tablet, Rfl: 0    HYDROcodone-acetaminophen (NORCO)  mg per tablet, Take 1 tablet by mouth every 8 (eight) hours as needed for Pain., Disp: 90 tablet, Rfl: 0    HYDROcodone-acetaminophen (NORCO) 7.5-325 mg per tablet, Take 1 tablet by mouth every 8 (eight) hours as needed for Pain., Disp: 90 tablet, Rfl: 0    HYDROcodone-acetaminophen (NORCO) 7.5-325 mg per tablet, Take 1 tablet by mouth every 8 (eight) hours as needed for Pain., Disp: 90 tablet, Rfl: 0    lisinopriL 10 MG tablet, Take 0.5 tablets (5 mg total) by mouth once daily., Disp: 90 tablet, Rfl: 3    loratadine (CLARITIN) 10 mg tablet, Take 10 mg by mouth once daily., Disp: , Rfl:     metoprolol succinate (TOPROL-XL) 25 MG 24 hr tablet, Take 1 tablet (25 mg total) by mouth once daily., Disp: 90 tablet, Rfl: 3    pantoprazole (PROTONIX) 40 MG tablet, Take 1 tablet (40 mg total) by mouth 2 (two) times daily., Disp: 60 tablet, Rfl: 0    sucralfate (CARAFATE) 100 mg/mL suspension, Take 10 mLs (1 g total) by mouth 4 (four) times  "daily before meals and nightly., Disp: 1200 mL, Rfl: 0    traZODone (DESYREL) 50 MG tablet, Take 2 tablets (100 mg total) by mouth nightly as needed for Insomnia., Disp: 180 tablet, Rfl: 1    triamcinolone acetonide 0.1% (KENALOG) 0.1 % cream, Apply topically 2 (two) times daily., Disp: 45 g, Rfl: 2  No current facility-administered medications for this visit.    Facility-Administered Medications Ordered in Other Visits:     lidocaine (PF) 10 mg/ml (1%) injection 5 mg, 0.5 mL, Intradermal, Once, Azeem Anderson MD    Review of Systems   Constitutional:  Negative for appetite change, chills, fever and unexpected weight change (wt down 41lbs since Dec, intentional).   HENT:  Negative for sore throat and trouble swallowing.    Eyes:  Negative for visual disturbance.   Respiratory:  Positive for cough (mild) and shortness of breath (with exertion or out in the hot weather). Negative for wheezing.    Cardiovascular:  Negative for chest pain, palpitations and leg swelling.   Gastrointestinal:  Negative for abdominal pain, blood in stool, constipation, diarrhea, nausea and vomiting.   Genitourinary:  Negative for difficulty urinating, dysuria and hematuria.   Musculoskeletal:  Positive for arthralgias (chronic right knee pain) and gait problem (mobility limited d/t knee pain, walks with cane).   Skin:  Negative for rash.   Neurological:  Negative for dizziness, syncope, speech difficulty, numbness and headaches.   Psychiatric/Behavioral:  Negative for dysphoric mood. The patient is not nervous/anxious.           Objective:      Vitals:    11/09/23 0955   BP: 136/88   Pulse: 94   SpO2: 97%   Weight: 104.3 kg (230 lb)   Height: 5' 0.5" (1.537 m)     Physical Exam  Vitals and nursing note reviewed.   Constitutional:       General: She is not in acute distress.     Appearance: She is well-developed.      Comments: Morbidly obese white female, appears older than stated age   HENT:      Head: Normocephalic and atraumatic.    "   Right Ear: Tympanic membrane and ear canal normal.      Left Ear: Tympanic membrane and ear canal normal.   Neck:      Vascular: No carotid bruit.   Cardiovascular:      Rate and Rhythm: Normal rate and regular rhythm.      Heart sounds: Murmur (2nd ICS RSB) heard.      Systolic murmur is present with a grade of 3/6.      No friction rub. No gallop.   Pulmonary:      Effort: Pulmonary effort is normal. No respiratory distress.      Breath sounds: Rhonchi (slight rhonchi right base posteriorly) present. No wheezing or rales.   Abdominal:      General: There is no distension.      Palpations: Abdomen is soft.      Tenderness: There is no abdominal tenderness.   Musculoskeletal:      Cervical back: Neck supple.      Right knee: No effusion or erythema. Normal range of motion.      Right lower leg: No edema.      Left lower leg: No edema.   Lymphadenopathy:      Cervical: No cervical adenopathy.   Skin:     General: Skin is warm and dry.      Findings: No rash.   Neurological:      General: No focal deficit present.      Mental Status: She is alert and oriented to person, place, and time.      Gait: Gait abnormal (antalgic gait).           Assessment:       1. Hypertensive kidney disease with stage 4 chronic kidney disease    2. CKD (chronic kidney disease) stage 4, GFR 15-29 ml/min    3. PAF (paroxysmal atrial fibrillation)    4. Dyslipidemia    5. Primary osteoarthritis of both knees    6. Encounter for long-term (current) use of other medications    7. Encounter for therapeutic drug monitoring    8. Need for vaccination    9. Screening for osteoporosis    10. Menopause    11. Screening mammogram, encounter for    12. Nicotine dependence, cigarettes, uncomplicated           Plan:       1. Hypertensive kidney disease with stage 4 chronic kidney disease   -BP stable    2. CKD (chronic kidney disease) stage 4, GFR 15-29 ml/min   -has follow-up with Dr. Ish wells with labs scheduled    3. PAF (paroxysmal atrial  fibrillation)   -stable in sinus rhythm today, status post cardioversion in August with follow-up with EP and cards next week    4. Dyslipidemia   -well controlled on last labs    5. Primary osteoarthritis of both knees  -stable chronic knee pain complaints  -     Pain Managment Profile 4, w/ Confirm, Ur; Future; Expected date: 11/09/2023    6. Encounter for long-term (current) use of other medications  -     Pain Managment Profile 4, w/ Confirm, Ur; Future; Expected date: 11/09/2023    7. Encounter for therapeutic drug monitoring  -     Pain Managment Profile 4, w/ Confirm, Ur; Future; Expected date: 11/09/2023    8. Need for vaccination  -     Influenza - Quadrivalent (Adjuvanted)    9. Screening for osteoporosis  -     DXA Bone Density Appendicular Skeleton; Future; Expected date: 11/09/2023    10. Menopause  -     DXA Bone Density Appendicular Skeleton; Future; Expected date: 11/09/2023    11. Screening mammogram, encounter for  -     Mammo Digital Screening Bilat; Future; Expected date: 11/09/2023    12. Nicotine dependence, cigarettes, uncomplicated  -     CT Chest Lung Screening Low Dose; Future; Expected date: 11/09/2023      Follow up in about 3 months (around 2/9/2024) for Dr. You next visit, COPD, arthritis.      Patient encouraged to schedule mammogram, DEXA and LDCT.  She is also overdue for colon cancer screening and counseled on risks    Counseled on age and gender appropriate medical preventative services, including cancer screenings, immunizations, overall nutritional health, need for a consistent exercise regimen and an overall push towards maintaining a vigorous and active lifestyle.      11/9/2023 Roselyn Cote NP

## 2023-11-09 NOTE — TELEPHONE ENCOUNTER
----- Message from Swetha Horton sent at 11/9/2023 10:36 AM CST -----  Pt needs to schedule a 3 month f/u.  [phone]

## 2023-11-10 DIAGNOSIS — M79.7 FIBROMYALGIA: ICD-10-CM

## 2023-11-10 DIAGNOSIS — M25.561 RIGHT KNEE PAIN, UNSPECIFIED CHRONICITY: ICD-10-CM

## 2023-11-10 RX ORDER — HYDROCODONE BITARTRATE AND ACETAMINOPHEN 10; 325 MG/1; MG/1
1 TABLET ORAL EVERY 8 HOURS PRN
Qty: 90 TABLET | Refills: 0 | Status: SHIPPED | OUTPATIENT
Start: 2023-11-10 | End: 2024-01-08 | Stop reason: SDUPTHER

## 2023-11-10 NOTE — TELEPHONE ENCOUNTER
Spoke with pt. States she was seen for an appt yesterday. She needs pain meds set up for Dr. You. Rx set up    Per  10/8/23

## 2023-11-10 NOTE — TELEPHONE ENCOUNTER
----- Message from Swetha Horton sent at 11/10/2023  8:37 AM CST -----  Pt stated that her prescriptions hasn't been filled.  [phone]     Sabrina Lofton is a 75 year old female presenting for Filler.    Medications verified, no changes.    Denies known Latex allergy or symptoms of Latex sensitivity.    History   Smoking Status   • Former Smoker   • Packs/day: 0.50   • Years: 25.00   • Types: Cigarettes   • Quit date: 1/1/1989   Smokeless Tobacco   • Never Used     Comment: quit about 15 yrs ago

## 2023-11-15 ENCOUNTER — TELEPHONE (OUTPATIENT)
Dept: PHARMACY | Facility: CLINIC | Age: 68
End: 2023-11-15
Payer: MEDICARE

## 2023-11-15 ENCOUNTER — OFFICE VISIT (OUTPATIENT)
Dept: CARDIOLOGY | Facility: CLINIC | Age: 68
End: 2023-11-15
Payer: MEDICARE

## 2023-11-15 VITALS
WEIGHT: 239 LBS | HEIGHT: 61 IN | SYSTOLIC BLOOD PRESSURE: 126 MMHG | BODY MASS INDEX: 45.12 KG/M2 | DIASTOLIC BLOOD PRESSURE: 78 MMHG

## 2023-11-15 DIAGNOSIS — I48.0 PAROXYSMAL ATRIAL FIBRILLATION: Primary | ICD-10-CM

## 2023-11-15 DIAGNOSIS — I35.2 NONRHEUMATIC AORTIC VALVE STENOSIS WITH REGURGITATION: ICD-10-CM

## 2023-11-15 DIAGNOSIS — N18.31 STAGE 3A CHRONIC KIDNEY DISEASE: ICD-10-CM

## 2023-11-15 DIAGNOSIS — I42.8 NONISCHEMIC CARDIOMYOPATHY: ICD-10-CM

## 2023-11-15 DIAGNOSIS — E78.5 DYSLIPIDEMIA: ICD-10-CM

## 2023-11-15 DIAGNOSIS — E66.01 MORBID OBESITY: ICD-10-CM

## 2023-11-15 PROCEDURE — 1126F PR PAIN SEVERITY QUANTIFIED, NO PAIN PRESENT: ICD-10-PCS | Mod: CPTII,S$GLB,, | Performed by: NURSE PRACTITIONER

## 2023-11-15 PROCEDURE — 1101F PR PT FALLS ASSESS DOC 0-1 FALLS W/OUT INJ PAST YR: ICD-10-PCS | Mod: CPTII,S$GLB,, | Performed by: NURSE PRACTITIONER

## 2023-11-15 PROCEDURE — 3008F BODY MASS INDEX DOCD: CPT | Mod: CPTII,S$GLB,, | Performed by: NURSE PRACTITIONER

## 2023-11-15 PROCEDURE — 1126F AMNT PAIN NOTED NONE PRSNT: CPT | Mod: CPTII,S$GLB,, | Performed by: NURSE PRACTITIONER

## 2023-11-15 PROCEDURE — 99214 PR OFFICE/OUTPT VISIT, EST, LEVL IV, 30-39 MIN: ICD-10-PCS | Mod: 25,S$GLB,, | Performed by: NURSE PRACTITIONER

## 2023-11-15 PROCEDURE — 4010F PR ACE/ARB THEARPY RXD/TAKEN: ICD-10-PCS | Mod: CPTII,S$GLB,, | Performed by: NURSE PRACTITIONER

## 2023-11-15 PROCEDURE — 3074F SYST BP LT 130 MM HG: CPT | Mod: CPTII,S$GLB,, | Performed by: NURSE PRACTITIONER

## 2023-11-15 PROCEDURE — 3061F PR NEG MICROALBUMINURIA RESULT DOCUMENTED/REVIEW: ICD-10-PCS | Mod: CPTII,S$GLB,, | Performed by: NURSE PRACTITIONER

## 2023-11-15 PROCEDURE — 4010F ACE/ARB THERAPY RXD/TAKEN: CPT | Mod: CPTII,S$GLB,, | Performed by: NURSE PRACTITIONER

## 2023-11-15 PROCEDURE — 3078F DIAST BP <80 MM HG: CPT | Mod: CPTII,S$GLB,, | Performed by: NURSE PRACTITIONER

## 2023-11-15 PROCEDURE — 93000 ELECTROCARDIOGRAM COMPLETE: CPT | Mod: S$GLB,,, | Performed by: INTERNAL MEDICINE

## 2023-11-15 PROCEDURE — 1159F PR MEDICATION LIST DOCUMENTED IN MEDICAL RECORD: ICD-10-PCS | Mod: CPTII,S$GLB,, | Performed by: NURSE PRACTITIONER

## 2023-11-15 PROCEDURE — 99999 PR PBB SHADOW E&M-EST. PATIENT-LVL IV: ICD-10-PCS | Mod: PBBFAC,,, | Performed by: NURSE PRACTITIONER

## 2023-11-15 PROCEDURE — 99999 PR PBB SHADOW E&M-EST. PATIENT-LVL IV: CPT | Mod: PBBFAC,,, | Performed by: NURSE PRACTITIONER

## 2023-11-15 PROCEDURE — 3288F FALL RISK ASSESSMENT DOCD: CPT | Mod: CPTII,S$GLB,, | Performed by: NURSE PRACTITIONER

## 2023-11-15 PROCEDURE — 3008F PR BODY MASS INDEX (BMI) DOCUMENTED: ICD-10-PCS | Mod: CPTII,S$GLB,, | Performed by: NURSE PRACTITIONER

## 2023-11-15 PROCEDURE — 3061F NEG MICROALBUMINURIA REV: CPT | Mod: CPTII,S$GLB,, | Performed by: NURSE PRACTITIONER

## 2023-11-15 PROCEDURE — 3288F PR FALLS RISK ASSESSMENT DOCUMENTED: ICD-10-PCS | Mod: CPTII,S$GLB,, | Performed by: NURSE PRACTITIONER

## 2023-11-15 PROCEDURE — 3078F PR MOST RECENT DIASTOLIC BLOOD PRESSURE < 80 MM HG: ICD-10-PCS | Mod: CPTII,S$GLB,, | Performed by: NURSE PRACTITIONER

## 2023-11-15 PROCEDURE — 99214 OFFICE O/P EST MOD 30 MIN: CPT | Mod: 25,S$GLB,, | Performed by: NURSE PRACTITIONER

## 2023-11-15 PROCEDURE — 93000 EKG 12-LEAD: ICD-10-PCS | Mod: S$GLB,,, | Performed by: INTERNAL MEDICINE

## 2023-11-15 PROCEDURE — 3074F PR MOST RECENT SYSTOLIC BLOOD PRESSURE < 130 MM HG: ICD-10-PCS | Mod: CPTII,S$GLB,, | Performed by: NURSE PRACTITIONER

## 2023-11-15 PROCEDURE — 3066F PR DOCUMENTATION OF TREATMENT FOR NEPHROPATHY: ICD-10-PCS | Mod: CPTII,S$GLB,, | Performed by: NURSE PRACTITIONER

## 2023-11-15 PROCEDURE — 1101F PT FALLS ASSESS-DOCD LE1/YR: CPT | Mod: CPTII,S$GLB,, | Performed by: NURSE PRACTITIONER

## 2023-11-15 PROCEDURE — 1159F MED LIST DOCD IN RCRD: CPT | Mod: CPTII,S$GLB,, | Performed by: NURSE PRACTITIONER

## 2023-11-15 PROCEDURE — 3066F NEPHROPATHY DOC TX: CPT | Mod: CPTII,S$GLB,, | Performed by: NURSE PRACTITIONER

## 2023-11-15 RX ORDER — AMIODARONE HYDROCHLORIDE 200 MG/1
200 TABLET ORAL 3 TIMES DAILY
Qty: 15 TABLET | Refills: 0 | Status: SHIPPED | OUTPATIENT
Start: 2023-11-15 | End: 2024-01-08 | Stop reason: SDUPTHER

## 2023-11-15 NOTE — TELEPHONE ENCOUNTER
I have reached out to Iris Mueller to inform her of the [employer]  application process for Eliquis and whats required to apply.  Iris Mueller did not answer. I left a voicemail and mailed a letter introducing her to the pharmacy patient assistance program. I will follow up in 5 business days.

## 2023-11-15 NOTE — LETTER
November 15, 2023    Iris Mueller  [address] [facility] [address]             [facility] - Pharmacy Assistance  [address]  [facility] [address]  Fax: [phone] Dear Ms. Iris Mueller     It was a pleasure speaking with you. To follow up on our conversation on 11/15/2023, the Pharmacy Patient Assistance Program needs more information from you before we can submit your Eliquis application to the [facility]  Program. Please return the following documents to the Pharmacy Patient Assistance office (address, email and fax listed below) asap:      Proof of household Income( such as social security statement, 1099 form, pension statement or 3 consecutive pay stubs  Copy of all Insurance cards( front and back)  Printout from your Insurance or Pharmacy that shows how much you have spent on prescriptions this year  Signed and dated HIPAA /Patient Information Forms              Whats Next:     Once I receive your documentation and authorization from your Provider, your application will be submitted to the [employer] Assistance Program for review. Please be advised it will take 2 to 4 weeks for your application to be processed so you may have to purchase a month's supply of medication from your pharmacy to hold you over during the waiting period. You will be notified of approval or denial by The Program(mail) or myself.      If you have any questions or concerns, please give me a call         Sincerely   Nory Cruz    Phone# [phone]  Fax# [phone]  Email: [email or web address]

## 2023-11-15 NOTE — PROGRESS NOTES
Subjective:    Patient ID:  Iris Mueller is a 68 y.o. female patient here for evaluation Follow-up      History of Present Illness:       S/P Cardioversion with Dr. Miranda  Now on Amiodarone   Loaded amiodarone 200 mg PO BID X 7 Days then daily  Eliquis 5 mg PO BID  She is in AFIB today rates are controlled  She is SOB with exertion and feels her heart racing with exertion.         Review of patient's allergies indicates:  No Known Allergies    Past Medical History:   Diagnosis Date    Arthritis     Asthma     COPD (chronic obstructive pulmonary disease)     Encounter for blood transfusion     Hypertension     Wound infection 2019    Right knee     Past Surgical History:   Procedure Laterality Date    ANGIOGRAM, CORONARY, WITH LEFT HEART CATHETERIZATION N/A 2022    Procedure: Angiogram, Coronary, with Left Heart Cath;  Surgeon: Jorge Tran MD;  Location: [facility] CATH/EP LAB;  Service: Cardiology;  Laterality: N/A;     SECTION      ECHOCARDIOGRAM,TRANSESOPHAGEAL N/A 2023    Procedure: Transesophageal echo (MARIANO) intra-procedure log documentation;  Surgeon: Provider, [facility];  Location: [facility] EP LAB;  Service: Cardiology;  Laterality: N/A;    JOINT REPLACEMENT      Knee    KNEE ARTHROPLASTY Right 2019    Procedure: ARTHROPLASTY, KNEE;  Surgeon: Delio Chung MD;  Location: [facility] OR;  Service: Orthopedics;  Laterality: Right;  anesthesia:  general and block    REPLACEMENT OF WOUND VACUUM-ASSISTED CLOSURE DEVICE Right 2019    Procedure: REPLACEMENT, WOUND VAC;  Surgeon: Delio Chung MD;  Location: [facility] OR;  Service: Orthopedics;  Laterality: Right;    TONSILLECTOMY      TREATMENT OF CARDIAC ARRHYTHMIA N/A 2023    Procedure: Cardioversion or Defibrillation;  Surgeon: PJ Betancourt MD;  Location: [facility] EP LAB;  Service: Cardiology;  Laterality: N/A;  AF, MARIANO, DCCV, MAC, EH, 3 Prep    VENTRICULOGRAM, LEFT  2022    Procedure: Ventriculogram, Left;   Surgeon: Jorge Tran MD;  Location: [facility] CATH/EP LAB;  Service: Cardiology;;     Social History     Tobacco Use    Smoking status: Every Day     Current packs/day: 0.25     Average packs/day: 0.5 packs/day for 48.9 years (23.5 ttl pk-yrs)     Types: Cigarettes     Start date: 1975     Passive exposure: Current    Smokeless tobacco: Never   Substance Use Topics    Alcohol use: Yes     Comment: RARELY    Drug use: Never        Review of Systems:    As noted in HPI                   Objective        Vitals:    11/15/23 0814   BP: 126/78       LIPIDS - LAST 2   Lab Results   Component Value Date    CHOL 148 05/03/2023    CHOL 162 11/07/2022    HDL 53 05/03/2023    HDL 63 11/07/2022    LDLCALC 79 05/03/2023    LDLCALC 82 11/07/2022    TRIG 78 05/03/2023    TRIG 86 11/07/2022    CHOLHDL 2.8 05/03/2023    CHOLHDL 2.6 11/07/2022       CBC - LAST 2  Lab Results   Component Value Date    WBC 8.0 08/28/2023    WBC 6.5 05/03/2023    RBC 4.19 08/28/2023    RBC 3.91 05/03/2023    HGB 12.7 08/28/2023    HGB 11.7 05/03/2023    HCT 39.1 08/28/2023    HCT 37.7 05/03/2023    MCV 93.3 08/28/2023    MCV 96.4 05/03/2023    MCH 30.3 08/28/2023    MCH 29.9 05/03/2023    MCHC 32.5 08/28/2023    MCHC 31.0 (L) 05/03/2023    RDW 14.1 08/28/2023    RDW 14.0 05/03/2023     08/28/2023     05/03/2023    MPV 9.2 08/28/2023    MPV 9.5 05/03/2023    GRAN 5.5 12/26/2022    GRAN 72.1 12/26/2022    LYMPH 1,040 08/28/2023    LYMPH 13.0 08/28/2023    MONO 544 08/28/2023    MONO 6.8 08/28/2023    BASO 48 08/28/2023    BASO 39 05/03/2023    NRBC 0 12/26/2022    NRBC 0 12/25/2022       CHEMISTRY & LIVER FUNCTION - LAST 2  Lab Results   Component Value Date     08/28/2023     (L) 07/11/2023    K 4.2 08/28/2023    K 4.8 07/11/2023     08/28/2023    CL 96 (L) 07/11/2023    CO2 26 08/28/2023    CO2 25 07/11/2023    ANIONGAP 5 (L) 12/26/2022    ANIONGAP 5 (L) 12/25/2022    BUN 21 08/28/2023    BUN 29 (H) 07/11/2023    CREATININE  2.00 (H) 08/28/2023    CREATININE 2.35 (H) 07/11/2023    GLU 84 08/28/2023    GLU 82 07/11/2023    CALCIUM 9.5 08/28/2023    CALCIUM 8.8 07/11/2023    PH 6.1 05/08/2023    PH 6.4 01/25/2022    MG 2.2 12/26/2022    MG 1.8 12/26/2022    ALBUMIN 4.2 05/08/2023    ALBUMIN 4.1 05/03/2023    PROT 6.7 05/03/2023    PROT 5.5 (L) 12/26/2022    ALKPHOS 57 12/26/2022    ALKPHOS 62 12/25/2022    ALT 3 (L) 05/03/2023    ALT 9 (L) 12/26/2022    AST 8 (L) 05/03/2023    AST 8 (L) 12/26/2022    BILITOT 0.5 05/03/2023    BILITOT 0.6 12/26/2022        CARDIAC PROFILE - LAST 2  Lab Results   Component Value Date    BNP 95 07/11/2023     (H) 12/25/2022    TROPONINI 0.523 (H) 12/23/2022    TROPONINI 0.648 (H) 12/23/2022    TROPONINIHS 120.8 (HH) 12/25/2022    TROPONINIHS 292.9 (HH) 12/24/2022        COAGULATION - LAST 2  Lab Results   Component Value Date    INR 1.1 08/28/2023    APTT 33 (H) 08/28/2023       ENDOCRINE & PSA - LAST 2  Lab Results   Component Value Date    HGBA1C 5.2 12/23/2022    HGBA1C 4.9 09/14/2019    MICROALBUR 0.4 05/03/2023    MICROALBUR 0.2 01/25/2022    TSH 1.889 12/23/2022    TSH 2.88 07/15/2020    PROCAL <0.05 12/25/2022    PROCAL <0.05 12/24/2022        ECHOCARDIOGRAM RESULTS  Results for orders placed during the hospital encounter of 08/31/23    Transesophageal echo (MARIANO)    Interpretation Summary    MARIANO to evaluate the LA/WENCESLAO prior to DCCV.    Left Ventricle: The left ventricle is normal in size. Normal wall thickness. Normal wall motion. There is low normal systolic function with a visually estimated ejection fraction of 50 - 55%.    Left Atrium: Left atrium is dilated. The left atrial appendage appears normal. Appendage velocity is reduced at less than 40 cm/sec. There is no thrombus in the cavity or appendage. Light spontaneous echo contrast visualized in the left atrial cavity and appendage.    Right Ventricle: Normal right ventricular cavity size. Wall thickness is normal. Right ventricle wall  motion is ormal. Systolic function is normal.    Right Atrium: Right atrium is dilated.    Aortic Valve: The aortic valve is a trileaflet valve. There is moderate aortic valve sclerosis. Mildly restricted motion. Aortic valve stenosis is present, but unable to grade severity of AS. There is mild aortic regurgitation.    Mitral Valve: Mild mitral annular calcification. There is mild regurgitation.    Tricuspid Valve: There is mild regurgitation.    Aorta: Grade 2 atherosclerosis of the descending aorta.      CURRENT/PREVIOUS VISIT EKG  Results for orders placed or performed during the hospital encounter of 08/31/23   EKG 12-LEAD    Collection Time: 08/31/23 12:03 PM    Narrative    Test Reason : I48.91,    Vent. Rate : 068 BPM     Atrial Rate : 068 BPM     P-R Int : 208 ms          QRS Dur : 102 ms      QT Int : 454 ms       P-R-T Axes : 067 049 057 degrees     QTc Int : 482 ms    Sinus rhythm with Premature atrial complexes  Minimal voltage criteria for LVH, may be normal variant ( Solomon product )  Abnormal ECG  When compared with ECG of 18-AUG-2023 08:27,  Sinus rhythm has replaced Atrial fibrillation  Confirmed by Aura Pedroza MD ([id number]) on 8/31/2023 2:02:31 PM    Referred By: PJ WINCHESTER           Confirmed By:Aura Pedroza MD     No valid procedures specified.   Results for orders placed in visit on 08/08/19    Nuc Pharm Stress test - Radiologist interpreted    Interpretation Summary    The patient reported no chest pain during the stress test.    No significant EKG changes from baseline    Myoview SPECT scan report is awaited for correlation.        PHYSICAL EXAM  CONSTITUTIONAL: Well built, well nourished in no apparent distress  NECK: no carotid bruit, no JVD  LUNGS: CTA  CHEST WALL: no tenderness  HEART: regular rate and rhythm, S1, S2 normal, no murmur, click, rub or gallop   ABDOMEN: soft, non-tender; bowel sounds normal; no masses,  no organomegaly  EXTREMITIES: Extremities normal, no edema, no calf  tenderness noted  NEURO: AAO X 3    I HAVE REVIEWED :    The vital signs, nurses notes, and all the pertinent radiology and labs.        Current Outpatient Medications   Medication Instructions    albuterol (PROVENTIL/VENTOLIN HFA) 90 mcg/actuation inhaler 2 puffs, Inhalation, Every 6 hours PRN, Rescue     amiodarone (PACERONE) 200 mg, Oral, 3 times daily    amLODIPine (NORVASC) 2.5 mg, Oral, Nightly    apixaban (ELIQUIS) 5 mg, Oral, 2 times daily    atorvastatin (LIPITOR) 10 mg, Oral, Nightly    betamethasone dipropionate 0.05 % cream Topical (Top), 2 times daily    buPROPion (WELLBUTRIN SR) 150 mg, Oral, 2 times daily    ergocalciferol (ERGOCALCIFEROL) 50,000 Units, Oral, Every 7 days    ferrous sulfate (FEOSOL) 325 mg, Oral, Daily    FLUoxetine 40 mg, Oral, Daily    furosemide (LASIX) 20 mg, Oral, Daily PRN    gabapentin (NEURONTIN) 300 mg, Oral, 3 times daily    HYDROcodone-acetaminophen (NORCO)  mg per tablet 1 tablet, Oral, Every 8 hours PRN    HYDROcodone-acetaminophen (NORCO)  mg per tablet 1 tablet, Oral, Every 8 hours PRN    HYDROcodone-acetaminophen (NORCO) 7.5-325 mg per tablet 1 tablet, Oral, Every 8 hours PRN    HYDROcodone-acetaminophen (NORCO) 7.5-325 mg per tablet 1 tablet, Oral, Every 8 hours PRN    loratadine (CLARITIN) 10 mg, Oral, Daily    metoprolol succinate (TOPROL-XL) 25 mg, Oral, Daily    traZODone (DESYREL) 100 mg, Oral, Nightly PRN    triamcinolone acetonide 0.1% (KENALOG) 0.1 % cream Topical (Top), 2 times daily          Assessment & Plan     Paroxysmal atrial fibrillation  Recommend to increase Amiodarone to 200 mg PO TID x 5 days followed by BID  Continue Eliquis  Have sent help for Eliquis assistance  She does have Moderae AR and LAE  She will follow up with Matthew as well.   I have message Taniya my plan as well.      Problem List Items Addressed This Visit          Cardiac/Vascular    Dyslipidemia    Paroxysmal atrial fibrillation - Primary     Recommend to increase  Amiodarone to 200 mg PO TID x 5 days followed by BID  Continue Eliquis  Have sent help for Eliquis assistance  She does have Moderae AR and LAE  She will follow up with Matthew as well.   I have message Taniya my plan as well.         Relevant Orders    IN OFFICE EKG 12-LEAD (to [place])    Ambulatory referral/consult to Pharmacy Assistance    Nonrheumatic aortic valve stenosis with regurgitation    Nonischemic cardiomyopathy       Renal/    Chronic kidney disease       Endocrine    Morbid obesity      Follow up in 1 week with EKG  In meantime continue Metoprolol, amlodipine, Atorvastatin, and Lasix at present dosing.     No follow-ups on file.

## 2023-11-15 NOTE — ASSESSMENT & PLAN NOTE
Recommend to increase Amiodarone to 200 mg PO TID x 5 days followed by BID  Continue Eliquis  Have sent help for Eliquis assistance  She does have Moderae AR and LAE  She will follow up with Matthew as well.   I have message Taniya my plan as well.

## 2023-11-22 ENCOUNTER — CLINICAL SUPPORT (OUTPATIENT)
Dept: CARDIOLOGY | Facility: CLINIC | Age: 68
End: 2023-11-22
Payer: MEDICARE

## 2023-11-22 DIAGNOSIS — I48.0 PAROXYSMAL ATRIAL FIBRILLATION: Primary | ICD-10-CM

## 2023-11-22 PROCEDURE — 93010 EKG 12-LEAD: ICD-10-PCS | Mod: S$GLB,,, | Performed by: GENERAL PRACTICE

## 2023-11-22 PROCEDURE — 93005 EKG 12-LEAD: ICD-10-PCS | Mod: S$GLB,,, | Performed by: NURSE PRACTITIONER

## 2023-11-22 PROCEDURE — 93010 ELECTROCARDIOGRAM REPORT: CPT | Mod: S$GLB,,, | Performed by: GENERAL PRACTICE

## 2023-11-22 PROCEDURE — 93005 ELECTROCARDIOGRAM TRACING: CPT | Mod: S$GLB,,, | Performed by: NURSE PRACTITIONER

## 2023-11-22 NOTE — PROGRESS NOTES
Patient came in for 1 week f/u EKG per maverick gilbert NP. Performed EKG on patient and it had some changes from last appointment. I showed Dr. Fournier and he stated that he dont want to change anything at this time and to follow up in a week with another nurse visit for an EKG. Patient is not taking amiodarone TID she is taking it BID and Dr. Fournier stated that that is ok at this time.

## 2023-11-29 ENCOUNTER — TELEPHONE (OUTPATIENT)
Dept: CARDIOLOGY | Facility: CLINIC | Age: 68
End: 2023-11-29
Payer: MEDICARE

## 2023-11-29 NOTE — TELEPHONE ENCOUNTER
----- Message from Karsten Montes sent at 11/29/2023 11:07 AM CST -----  Type: Needs Medical Advice  Who Called:  pt  Symptoms (please be specific):  pt said she need to reschedule her EKG and she need to speak to the office about the extra meds that she was just put on for the EKG--please call and advise--said she missed the on that was schedule Monday--please call and advise  Best Call Back Number: [phone] (home)     Additional Information: thank you

## 2023-11-30 PROCEDURE — 93000 EKG 12-LEAD: ICD-10-PCS | Mod: S$GLB,,, | Performed by: INTERNAL MEDICINE

## 2023-11-30 PROCEDURE — 93000 ELECTROCARDIOGRAM COMPLETE: CPT | Mod: S$GLB,,, | Performed by: INTERNAL MEDICINE

## 2023-12-05 ENCOUNTER — HOSPITAL ENCOUNTER (OUTPATIENT)
Dept: RADIOLOGY | Facility: HOSPITAL | Age: 68
Discharge: HOME | End: 2023-12-05
Attending: NURSE PRACTITIONER
Payer: MEDICARE

## 2023-12-05 DIAGNOSIS — F17.210 NICOTINE DEPENDENCE, CIGARETTES, UNCOMPLICATED: ICD-10-CM

## 2023-12-05 DIAGNOSIS — Z12.31 SCREENING MAMMOGRAM, ENCOUNTER FOR: ICD-10-CM

## 2023-12-05 DIAGNOSIS — Z13.820 SCREENING FOR OSTEOPOROSIS: ICD-10-CM

## 2023-12-05 DIAGNOSIS — Z78.0 MENOPAUSE: ICD-10-CM

## 2023-12-05 PROCEDURE — 71271 CT THORAX LUNG CANCER SCR C-: CPT | Mod: TC,PO

## 2023-12-05 PROCEDURE — 77067 SCR MAMMO BI INCL CAD: CPT | Mod: TC,PO

## 2023-12-05 PROCEDURE — 77081 DXA BONE DENSITY APPENDICULR: CPT | Mod: TC,PO

## 2023-12-06 ENCOUNTER — TELEPHONE (OUTPATIENT)
Dept: CARDIOLOGY | Facility: CLINIC | Age: 68
End: 2023-12-06
Payer: MEDICARE

## 2023-12-06 NOTE — TELEPHONE ENCOUNTER
----- Message from Luba Pino MA sent at 12/6/2023 11:41 AM CST -----  Regarding: FW: EKG Order    ----- Message -----  From: Maria Esther Baez  Sent: 12/6/2023  11:37 AM CST  To: #  Subject: EKG Order                                        Good morning,    Please attach an order for the EKG done on 11/30/2023. It can't be read and sent to [facility] until that's been done.    Thanks,  Maria Esther, ADS

## 2023-12-08 ENCOUNTER — TELEPHONE (OUTPATIENT)
Dept: PHARMACY | Facility: CLINIC | Age: 68
End: 2023-12-08
Payer: MEDICARE

## 2023-12-08 DIAGNOSIS — M25.561 RIGHT KNEE PAIN, UNSPECIFIED CHRONICITY: ICD-10-CM

## 2023-12-08 DIAGNOSIS — M79.7 FIBROMYALGIA: ICD-10-CM

## 2023-12-08 RX ORDER — HYDROCODONE BITARTRATE AND ACETAMINOPHEN 10; 325 MG/1; MG/1
1 TABLET ORAL EVERY 8 HOURS PRN
Qty: 90 TABLET | Refills: 0 | OUTPATIENT
Start: 2023-12-08 | End: 2024-01-12

## 2023-12-08 NOTE — TELEPHONE ENCOUNTER
I have spoken with Iris Mueller and informed her of the [employer]  application process for Eliquis and what's required to apply.  Iris Mueller will provide the following documents: Proof of household Income( such as social security statement, 1099 form, pension statement or 3 consecutive pay stubs, Copy of all Insurance cards( front and back), Printout from your Insurance or Pharmacy that shows how much you have spent on prescriptions this year, and Signed and dated HIPAA /Patient Information Forms        I will follow up with the patient in 5 business days.

## 2023-12-08 NOTE — LETTER
December 8, 2023    Iris Mueller  [address] [facility] [address]             [facility] Pharmacy Assistance  [address]  [facility] [address]  Fax: [phone] Dear Ms. Iris Mueller     It was a pleasure speaking with you. To follow up on our conversation on 12/8/2023, the Pharmacy Patient Assistance Program needs more information from you before we can submit your Eliquis application to the [facility]  Program. Please return the following documents to the Pharmacy Patient Assistance office (address, email and fax listed below) asap:      Proof of household Income( such as social security statement, 1099 form, pension statement or 3 consecutive pay stubs  Copy of all Insurance cards( front and back)  Printout from your Insurance or Pharmacy that shows how much you have spent on prescriptions this year  Signed and dated HIPAA /Patient Information Forms              Whats Next:     Once I receive your documentation and authorization from your Provider, your application will be submitted to the [facility] for review. Please be advised it will take 2 to 4 weeks for your application to be processed so you may have to purchase a month's supply of medication from your pharmacy to hold you over during the waiting period. You will be notified of approval or denial by The Program(mail) or myself.      If you have any questions or concerns, please give me a call         Sincerely   Nory Cruz   Phone# [phone]  Fax# [phone]  Email: [email or web address]

## 2023-12-08 NOTE — TELEPHONE ENCOUNTER
----- Message from Swetha Horton sent at 12/8/2023 11:29 AM CST -----  Pt needs a refill pain meds Hydrocodone. [place] [address]  [phone]

## 2024-01-05 ENCOUNTER — OFFICE VISIT (OUTPATIENT)
Dept: CARDIOLOGY | Facility: CLINIC | Age: 69
End: 2024-01-05
Payer: MEDICARE

## 2024-01-05 VITALS
BODY MASS INDEX: 41.69 KG/M2 | OXYGEN SATURATION: 99 % | SYSTOLIC BLOOD PRESSURE: 140 MMHG | HEART RATE: 99 BPM | DIASTOLIC BLOOD PRESSURE: 80 MMHG | HEIGHT: 61 IN | WEIGHT: 220.81 LBS

## 2024-01-05 DIAGNOSIS — J44.9 CHRONIC OBSTRUCTIVE PULMONARY DISEASE, UNSPECIFIED COPD TYPE: ICD-10-CM

## 2024-01-05 DIAGNOSIS — M15.9 OSTEOARTHRITIS OF MULTIPLE JOINTS, UNSPECIFIED OSTEOARTHRITIS TYPE: ICD-10-CM

## 2024-01-05 DIAGNOSIS — I50.20 HFREF (HEART FAILURE WITH REDUCED EJECTION FRACTION): ICD-10-CM

## 2024-01-05 DIAGNOSIS — I48.19 PERSISTENT ATRIAL FIBRILLATION: Primary | ICD-10-CM

## 2024-01-05 DIAGNOSIS — E66.01 CLASS 3 SEVERE OBESITY WITH BODY MASS INDEX (BMI) OF 40.0 TO 44.9 IN ADULT, UNSPECIFIED OBESITY TYPE, UNSPECIFIED WHETHER SERIOUS COMORBIDITY PRESENT: ICD-10-CM

## 2024-01-05 DIAGNOSIS — M79.7 FIBROMYALGIA: ICD-10-CM

## 2024-01-05 DIAGNOSIS — I42.8 NONISCHEMIC CARDIOMYOPATHY: ICD-10-CM

## 2024-01-05 DIAGNOSIS — I35.0 MODERATE AORTIC STENOSIS: ICD-10-CM

## 2024-01-05 DIAGNOSIS — I10 PRIMARY HYPERTENSION: ICD-10-CM

## 2024-01-05 DIAGNOSIS — I35.1 MODERATE AORTIC REGURGITATION: ICD-10-CM

## 2024-01-05 DIAGNOSIS — E78.2 MIXED HYPERLIPIDEMIA: ICD-10-CM

## 2024-01-05 DIAGNOSIS — Z96.651 ARTIFICIAL KNEE JOINT PRESENT, RIGHT: ICD-10-CM

## 2024-01-05 DIAGNOSIS — I27.20 PULMONARY HYPERTENSION: ICD-10-CM

## 2024-01-05 DIAGNOSIS — I50.42 CHRONIC COMBINED SYSTOLIC AND DIASTOLIC HEART FAILURE: ICD-10-CM

## 2024-01-05 DIAGNOSIS — I25.10 CORONARY ARTERY DISEASE INVOLVING NATIVE CORONARY ARTERY OF NATIVE HEART WITHOUT ANGINA PECTORIS: ICD-10-CM

## 2024-01-05 PROCEDURE — 99999 PR PBB SHADOW E&M-EST. PATIENT-LVL IV: CPT | Mod: PBBFAC,,, | Performed by: STUDENT IN AN ORGANIZED HEALTH CARE EDUCATION/TRAINING PROGRAM

## 2024-01-05 PROCEDURE — 99214 OFFICE O/P EST MOD 30 MIN: CPT | Mod: S$GLB,,, | Performed by: STUDENT IN AN ORGANIZED HEALTH CARE EDUCATION/TRAINING PROGRAM

## 2024-01-05 NOTE — PROGRESS NOTES
Electrophysiology Clinic Note    Reason for follow-up patient visit: Ongoing evaluation and recommendations regarding persistent atrial fibrillation, s/p MARIANO and cardioversion on 8/31/2023 with return to rate-controlled persistent atrial fibrillation.    PRESENTING HISTORY:     History of Present Illness:  Ms. Iris Mueller is a johnny 68-year-old woman who returns to clinic today for ongoing evaluation and recommendations regarding persistent atrial fibrillation, s/p MARIANO and cardioversion on 8/31/2023 with return to rate-controlled persistent atrial fibrillation. She has a past medical history significant for persistent atrial fibrillation, moderate aortic stenosis, moderate aortic regurgitation, a history of mildly reduced systolic function with recovery - most recent LVEF 50-55%, pulmonary hypertension, nonobstructive coronary artery disease of the LAD and RCA, hypertension, hyperlipidemia, COPD, osteoarthritis, and obesity.      She is followed in general cardiology clinic with Dr. Tran and was previously seen on 6/12/2023. At that encounter, they discussed her atrial fibrillation. She was incidentally diagnosed with atrial fibrillation on an ECG from 6/12/2023. She had been followed for her mildly reduced systolic function, LVEF 40%, with a coronary angiogram revealing nonobstructive coronary artery disease of the LAD and RCA in 12/2022. She has a history of aortic stenosis and aortic regurgitation that continues to be monitored with serial echocardiograms. In review of her symptoms while in atrial fibrillation, she reports very mild palpitations, slightly worsened shortness of breath, and exercise intolerance. She tells me her weights have remained stable on her PRN lasix. She reports increased stress taking care of her , and tells me that her sister was recently admitted to the hospital. She has remained on oral anticoagulation with apixaban 5mg po BID and denies any adverse bleeding events  while maintained on this agent. She underwent a MARIANO and cardioversion on 8/31/2023 and was started on amiodarone loading and maintenance. Unfortunately, she was noted to have returned to persistent atrial fibrillation on all subsequent ECGs. In discussion today, she does not think she is taking amiodarone, and thought this agent was supposed to be discontinued following her cardioversion. With improved rate control with metoprolol succinate 25mg po daily, her most recent LVEF was noted to have improved to LVEF 50-55%.    In discussion with Ms. Mueller today, she tells me that she is feeling overall quite well. She denies any episodes of dizziness, lightheadedness, syncope/presyncope, chest pain or chest discomfort, nausea or vomiting, orthopnea, or PND. She reports mild palpitations, slightly worsened shortness of breath, and exercise intolerance as above, but was unable to tell a difference in her symptoms while she was briefly in sinus rhythm. She reports baseline mild shortness of breath and dyspnea with exertion that she feels is unchanged. She cannot climb a flight of stairs secondary to knee, hip, and back pain.     Review of Systems:  Review of Systems   Constitutional:  Negative for activity change.        Mild exercise intolerance.    HENT:  Negative for nosebleeds, postnasal drip, rhinorrhea, sinus pressure/congestion, sneezing and sore throat.    Respiratory:  Positive for shortness of breath. Negative for apnea, cough, chest tightness and wheezing.    Cardiovascular:  Negative for chest pain, palpitations and leg swelling.   Gastrointestinal:  Negative for abdominal distention, abdominal pain, blood in stool, change in bowel habit, constipation, diarrhea, nausea and vomiting.   Genitourinary:  Negative for dysuria and hematuria.   Musculoskeletal:  Positive for arthralgias and back pain. Negative for gait problem.   Neurological:  Negative for dizziness, seizures, syncope, weakness, light-headedness,  headaches and coordination difficulties.        PAST HISTORY:     Past Medical History:   Diagnosis Date    Arthritis     Asthma     COPD (chronic obstructive pulmonary disease)     Encounter for blood transfusion     Hypertension     Wound infection 2019    Right knee       Past Surgical History:   Procedure Laterality Date    ANGIOGRAM, CORONARY, WITH LEFT HEART CATHETERIZATION N/A 2022    Procedure: Angiogram, Coronary, with Left Heart Cath;  Surgeon: Jorge Tran MD;  Location: [facility] CATH/EP LAB;  Service: Cardiology;  Laterality: N/A;     SECTION      ECHOCARDIOGRAM,TRANSESOPHAGEAL N/A 2023    Procedure: Transesophageal echo (MARIANO) intra-procedure log documentation;  Surgeon: Provider, [facility];  Location: [facility] EP LAB;  Service: Cardiology;  Laterality: N/A;    JOINT REPLACEMENT      Knee    KNEE ARTHROPLASTY Right 2019    Procedure: ARTHROPLASTY, KNEE;  Surgeon: Delio Chung MD;  Location: [facility] OR;  Service: Orthopedics;  Laterality: Right;  anesthesia:  general and block    REPLACEMENT OF WOUND VACUUM-ASSISTED CLOSURE DEVICE Right 2019    Procedure: REPLACEMENT, WOUND VAC;  Surgeon: Delio Chung MD;  Location: [facility] OR;  Service: Orthopedics;  Laterality: Right;    TONSILLECTOMY      TREATMENT OF CARDIAC ARRHYTHMIA N/A 2023    Procedure: Cardioversion or Defibrillation;  Surgeon: PJ Betancourt MD;  Location: [facility] EP LAB;  Service: Cardiology;  Laterality: N/A;  AF, MARIANO, DCCV, MAC, EH, 3 Prep    VENTRICULOGRAM, LEFT  2022    Procedure: Ventriculogram, Left;  Surgeon: Jorge Tran MD;  Location: [facility] CATH/EP LAB;  Service: Cardiology;;       Family History:  Family History   Problem Relation Age of Onset    Alzheimer's disease Mother        Social History:  She  reports that she has been smoking cigarettes. She started smoking about 49 years ago. She has a 23.5 pack-year smoking history. She has been exposed to tobacco smoke. She has never used  smokeless tobacco. She reports current alcohol use. She reports that she does not use drugs.      MEDICATIONS & ALLERGIES:     Review of patient's allergies indicates:  No Known Allergies    Current Outpatient Medications on File Prior to Visit   Medication Sig Dispense Refill    albuterol (PROVENTIL/VENTOLIN HFA) 90 mcg/actuation inhaler Inhale 2 puffs into the lungs every 6 (six) hours as needed for Wheezing. Rescue 18 g 2    amiodarone (PACERONE) 200 MG Tab Take 1 tablet (200 mg total) by mouth 3 (three) times daily. for 5 days 15 tablet 0    amLODIPine (NORVASC) 2.5 MG tablet Take 1 tablet (2.5 mg total) by mouth every evening. 90 tablet 3    apixaban (ELIQUIS) 5 mg Tab Take 1 tablet (5 mg total) by mouth 2 (two) times daily. 180 tablet 3    atorvastatin (LIPITOR) 10 MG tablet Take 1 tablet (10 mg total) by mouth every evening. 90 tablet 3    betamethasone dipropionate 0.05 % cream Apply topically 2 (two) times daily. 45 g 2    buPROPion (WELLBUTRIN SR) 150 MG TBSR 12 hr tablet Take 1 tablet (150 mg total) by mouth 2 (two) times daily. 180 tablet 3    ergocalciferol (ERGOCALCIFEROL) 50,000 unit Cap Take 50,000 Units by mouth every 7 days.      ferrous sulfate (FEOSOL) 325 mg (65 mg iron) Tab tablet Take 1 tablet (325 mg total) by mouth once daily.  0    FLUoxetine 40 MG capsule Take 1 capsule (40 mg total) by mouth once daily. 90 capsule 3    furosemide (LASIX) 20 MG tablet Take 20 mg by mouth daily as needed for Shortness of Breath.      gabapentin (NEURONTIN) 300 MG capsule Take 1 capsule (300 mg total) by mouth 3 (three) times daily. 270 capsule 1    HYDROcodone-acetaminophen (NORCO)  mg per tablet Take 1 tablet by mouth every 8 (eight) hours as needed for Pain. 90 tablet 0    HYDROcodone-acetaminophen (NORCO)  mg per tablet Take 1 tablet by mouth every 8 (eight) hours as needed for Pain. 90 tablet 0    HYDROcodone-acetaminophen (NORCO) 7.5-325 mg per tablet Take 1 tablet by mouth every 8 (eight)  "hours as needed for Pain. 90 tablet 0    HYDROcodone-acetaminophen (NORCO) 7.5-325 mg per tablet Take 1 tablet by mouth every 8 (eight) hours as needed for Pain. 90 tablet 0    loratadine (CLARITIN) 10 mg tablet Take 10 mg by mouth once daily.      metoprolol succinate (TOPROL-XL) 25 MG 24 hr tablet Take 1 tablet (25 mg total) by mouth once daily. 90 tablet 3    traZODone (DESYREL) 50 MG tablet Take 2 tablets (100 mg total) by mouth nightly as needed for Insomnia. 180 tablet 1    triamcinolone acetonide 0.1% (KENALOG) 0.1 % cream Apply topically 2 (two) times daily. 45 g 2     Current Facility-Administered Medications on File Prior to Visit   Medication Dose Route Frequency Provider Last Rate Last Admin    lidocaine (PF) 10 mg/ml (1%) injection 5 mg  0.5 mL Intradermal Once Azeem Anderson MD            OBJECTIVE:     Vital Signs:  BP (!) 140/80 (BP Location: Left arm, Patient Position: Sitting, BP Method: Large (Manual))   Pulse 99   Ht 5' 0.5" (1.537 m)   Wt 100.1 kg (220 lb 12.7 oz)   SpO2 99%   BMI 42.41 kg/m²     Physical Exam:  Physical Exam  Constitutional:       General: She is not in acute distress.     Appearance: Normal appearance. She is obese. She is not ill-appearing or diaphoretic.      Comments: Well-appearing woman in NAD.   HENT:      Head: Normocephalic and atraumatic.      Nose: Nose normal.      Mouth/Throat:      Mouth: Mucous membranes are moist.      Pharynx: Oropharynx is clear.   Eyes:      Pupils: Pupils are equal, round, and reactive to light.   Cardiovascular:      Rate and Rhythm: Normal rate. Rhythm irregular.      Pulses: Normal pulses.      Heart sounds: Murmur heard.      No friction rub. No gallop.      Comments: Irregularly irregular rhythm on right radial artery palpation. III/VI CHARLETTE best appreciated at the RUSB.   Pulmonary:      Effort: Pulmonary effort is normal. No respiratory distress.      Breath sounds: Normal breath sounds. No wheezing, rhonchi or rales.   Chest: "      Chest wall: No tenderness.   Abdominal:      General: There is no distension.      Palpations: Abdomen is soft.      Tenderness: There is no abdominal tenderness.   Musculoskeletal:         General: No swelling or tenderness.      Cervical back: Normal range of motion.      Right lower leg: No edema.      Left lower leg: No edema.      Comments: Prior right knee TKA.   Skin:     General: Skin is warm and dry.      Findings: No erythema, lesion or rash.   Neurological:      General: No focal deficit present.      Mental Status: She is alert and oriented to person, place, and time. Mental status is at baseline.      Motor: No weakness.      Gait: Gait normal.   Psychiatric:         Mood and Affect: Mood normal.         Behavior: Behavior normal.          Laboratory Data:  Lab Results   Component Value Date    WBC 8.0 08/28/2023    HGB 12.7 08/28/2023    HCT 39.1 08/28/2023    MCV 93.3 08/28/2023     08/28/2023     Lab Results   Component Value Date    GLU 84 08/28/2023     08/28/2023    K 4.2 08/28/2023     08/28/2023    CO2 26 08/28/2023    BUN 21 08/28/2023    CREATININE 2.00 (H) 08/28/2023    CALCIUM 9.5 08/28/2023    MG 2.2 12/26/2022     Lab Results   Component Value Date    INR 1.1 08/28/2023       Pertinent Cardiac Data:  ECG: Atrial fibrillation with ventricular response rate of 100 bpm,  ms, QT/QTc 348/448 ms.     Exercise Nuclear Cardiac Stress Test - SPECT - 8/8/2019:    The patient reported no chest pain during the stress test.    No significant EKG changes from baseline    Myoview SPECT scan report is awaited for correlation.  1. NO FINDINGS OF PHARMACOLOGICALLY INDUCED REVERSIBILITY.  2. Mild matched decrease in tracer accumulation within the inferior/inferolateral wall and anteroseptal wall.  3. ESTIMATED EJECTION FRACTION OF 79%.    Resting 2D Transthoracic Echocardiogram - 9/14/2019:  Concentric left ventricular remodeling.  Normal left ventricular systolic function. The  estimated ejection fraction is 60%  Normal LV diastolic function.  Mild aortic regurgitation.  Elevated central venous pressure (15 mm Hg).  The estimated PA systolic pressure is 32 mm Hg    Coronary Angiogram - 12/23/2022:    The estimated blood loss was <50 mL.    The pre-procedure left ventricular end diastolic pressure was 37.    The left ventricular ejection fraction was grossly normal.    Angiographically equivocal mid left circumflex stenosis with distal PAULO 3 flow    Mild disease of LAD and right coronary artery    Resting 2D Transthoracic Echocardiogram - 12/23/2022:  The estimated ejection fraction is 40%. There is a anterior apical segment that is hypo to akinetic with a wall motion abnormality  Grade II left ventricular diastolic dysfunction. Mean left atrial pressure 20 mm Hg. Mild mitral annular calcification  There are segmental left ventricular wall motion abnormalities.  Normal right ventricular size with normal right ventricular systolic function.  Moderate left atrial enlargement.  Moderate aortic regurgitation.  There is moderate aortic valve stenosis.  Aortic valve area is 1.55 cm2; peak velocity is 2.88 m/s; mean gradient is 15 mmHg.  Moderate mitral regurgitation.  There is mild pulmonary hypertension.  Mild tricuspid regurgitation.  Elevated central venous pressure (15 mmHg).  The estimated PA systolic pressure is 47 mmHg.    MARIANO and DCCV - 8/31/2023:    MARIANO to evaluate the LA/WENCESLAO prior to DCCV.    Left Ventricle: The left ventricle is normal in size. Normal wall thickness. Normal wall motion. There is low normal systolic function with a visually estimated ejection fraction of 50 - 55%.    Left Atrium: Left atrium is dilated. The left atrial appendage appears normal. Appendage velocity is reduced at less than 40 cm/sec. There is no thrombus in the cavity or appendage. Light spontaneous echo contrast visualized in the left atrial cavity and appendage.    Right Ventricle: Normal right  ventricular cavity size. Wall thickness is normal. Right ventricle wall motion is normal. Systolic function is normal.    Right Atrium: Right atrium is dilated.    Aortic Valve: The aortic valve is a trileaflet valve. There is moderate aortic valve sclerosis. Mildly restricted motion. Aortic valve stenosis is present, but unable to grade severity of AS. There is mild aortic regurgitation.    Mitral Valve: Mild mitral annular calcification. There is mild regurgitation.    Tricuspid Valve: There is mild regurgitation.    Aorta: Grade 2 atherosclerosis of the descending aorta.      ASSESSMENT & PLAN:   Ms. Iirs Mueller is a johnny 68-year-old woman who returns to clinic today for ongoing evaluation and recommendations regarding persistent atrial fibrillation, s/p MARIANO and cardioversion on 8/31/2023 with return to rate-controlled persistent atrial fibrillation. She has a past medical history significant for persistent atrial fibrillation, moderate aortic stenosis, moderate aortic regurgitation, a history of mildly reduced systolic function with recovery - most recent LVEF 50-55%, pulmonary hypertension, nonobstructive coronary artery disease of the LAD and RCA, hypertension, hyperlipidemia, COPD, osteoarthritis, and obesity.      - We discussed the pathophysiology of atrial fibrillation; specifically, we discussed her persistent atrial fibrillation and the concept that in patients with persistent atrial fibrillation, restoration and maintenance of sinus rhythm may become more challenging. We discussed that atrial fibrillation has an increased risk of CVA.   - She remains in asymptomatic persistent atrial fibrillation on examination today. She reports occasional mild symptoms of palpitations, slightly worsened shortness of breath, and exercise intolerance; however, she was unable to note a change in her symptoms following her cardioversion with a brief period of sinus rhythm. We discussed undergoing another  MARIANO and cardioversion for restoration of sinus rhythm, but she is presently uninterested in undergoing this procedure as she remains largely asymptomatic.   - She was previously loaded and maintained on amiodarone therapy, but accidentally discontinued this agent following her cardioversion. She reports excellent compliance on her apixaban. She has a history of coronary artery disease, with low-normal systolic function on her recent MARIANO, LVEF 50-55%. We will resume amiodarone maintenance with amiodarone 200mg po daily without loading at this time. She has remained on metoprolol succinate 25mg po daily.   - Her [id number] is 5 (HFmrEF, HTN, CAD, female gender, age 65-74), portending an annual adjusted risk of CVA of 6.7%. She remains on uninterrupted apixaban therapy with no bleeding events reported.   - We discussed the possibility of catheter ablation with pulmonary vein isolation should she continue to have symptoms and AF despite AAD therapy. She voices understanding, although she would like to attempt rhythm control with medication at this time.   - Possible underlying drivers of atrial fibrillation were addressed at this appointment, including recommendations for weight loss - now a class I recommendation. Review of laboratory data revealed acceptable TSH of 1.889. A request for sleep study may be considered at a future encounter to evaluate for possible underlying obstructive sleep apnea.      This patient will return to clinic with me in six months with resumption of amiodarone and continued uninterrupted oral anticoagulation with apixaban. All questions and concerns were addressed at this encounter.     Signing Physician:       SHEMAR Betancourt MD  Electrophysiology Attending

## 2024-01-08 DIAGNOSIS — M79.7 FIBROMYALGIA: ICD-10-CM

## 2024-01-08 DIAGNOSIS — M25.561 RIGHT KNEE PAIN, UNSPECIFIED CHRONICITY: ICD-10-CM

## 2024-01-08 PROBLEM — I48.19 PERSISTENT ATRIAL FIBRILLATION: Status: ACTIVE | Noted: 2023-06-12

## 2024-01-08 RX ORDER — AMIODARONE HYDROCHLORIDE 200 MG/1
200 TABLET ORAL DAILY
Qty: 30 TABLET | Refills: 11 | Status: SHIPPED | OUTPATIENT
Start: 2024-01-08

## 2024-01-08 RX ORDER — HYDROCODONE BITARTRATE AND ACETAMINOPHEN 10; 325 MG/1; MG/1
1 TABLET ORAL EVERY 8 HOURS PRN
Qty: 90 TABLET | Refills: 0 | OUTPATIENT
Start: 2024-01-08 | End: 2024-01-12

## 2024-01-08 NOTE — TELEPHONE ENCOUNTER
----- Message from Roselyn Abarca sent at 1/8/2024  8:13 AM CST -----  Refill for hydrocodone. [facility] on [address]. Pt #[phone]

## 2024-01-08 NOTE — TELEPHONE ENCOUNTER
Per  last filled on 12/8/23.  Patient up to date on appointments.   Prescription pended for provider review.

## 2024-01-10 ENCOUNTER — TELEPHONE (OUTPATIENT)
Dept: FAMILY MEDICINE | Facility: CLINIC | Age: 69
End: 2024-01-10
Payer: MEDICARE

## 2024-01-10 DIAGNOSIS — Z00.00 GENERAL MEDICAL EXAM: Primary | ICD-10-CM

## 2024-01-10 DIAGNOSIS — E78.5 DYSLIPIDEMIA: ICD-10-CM

## 2024-01-10 DIAGNOSIS — Z78.0 MENOPAUSE: ICD-10-CM

## 2024-01-10 DIAGNOSIS — Z79.899 ENCOUNTER FOR LONG-TERM (CURRENT) USE OF OTHER MEDICATIONS: ICD-10-CM

## 2024-01-10 DIAGNOSIS — N18.31 STAGE 3A CHRONIC KIDNEY DISEASE: ICD-10-CM

## 2024-01-10 DIAGNOSIS — I10 ESSENTIAL HYPERTENSION: ICD-10-CM

## 2024-01-12 ENCOUNTER — HOSPITAL ENCOUNTER (EMERGENCY)
Facility: HOSPITAL | Age: 69
Discharge: HOME | End: 2024-01-12
Attending: EMERGENCY MEDICINE
Payer: MEDICARE

## 2024-01-12 VITALS
BODY MASS INDEX: 42.26 KG/M2 | WEIGHT: 220 LBS | OXYGEN SATURATION: 98 % | DIASTOLIC BLOOD PRESSURE: 96 MMHG | TEMPERATURE: 98 F | SYSTOLIC BLOOD PRESSURE: 159 MMHG | HEART RATE: 103 BPM | RESPIRATION RATE: 18 BRPM

## 2024-01-12 DIAGNOSIS — S01.81XA LACERATION OF FOREHEAD, INITIAL ENCOUNTER: Primary | ICD-10-CM

## 2024-01-12 DIAGNOSIS — M25.562 LEFT KNEE PAIN: ICD-10-CM

## 2024-01-12 DIAGNOSIS — S80.02XA CONTUSION OF LEFT KNEE, INITIAL ENCOUNTER: ICD-10-CM

## 2024-01-12 DIAGNOSIS — M25.552 LEFT HIP PAIN: ICD-10-CM

## 2024-01-12 LAB
ALBUMIN SERPL BCP-MCNC: 3.1 G/DL (ref 3.5–5.2)
ALP SERPL-CCNC: 95 U/L (ref 55–135)
ALT SERPL W/O P-5'-P-CCNC: 15 U/L (ref 10–44)
ANION GAP SERPL CALC-SCNC: 11 MMOL/L (ref 8–16)
AST SERPL-CCNC: 16 U/L (ref 10–40)
BASOPHILS # BLD AUTO: 0.05 K/UL (ref 0–0.2)
BASOPHILS NFR BLD: 0.6 % (ref 0–1.9)
BILIRUB SERPL-MCNC: 0.6 MG/DL (ref 0.1–1)
BUN SERPL-MCNC: 28 MG/DL (ref 8–23)
CALCIUM SERPL-MCNC: 8.4 MG/DL (ref 8.7–10.5)
CHLORIDE SERPL-SCNC: 103 MMOL/L (ref 95–110)
CO2 SERPL-SCNC: 21 MMOL/L (ref 23–29)
CREAT SERPL-MCNC: 2.1 MG/DL (ref 0.5–1.4)
DIFFERENTIAL METHOD BLD: ABNORMAL
EOSINOPHIL # BLD AUTO: 0.2 K/UL (ref 0–0.5)
EOSINOPHIL NFR BLD: 2.4 % (ref 0–8)
ERYTHROCYTE [DISTWIDTH] IN BLOOD BY AUTOMATED COUNT: 15.9 % (ref 11.5–14.5)
EST. GFR  (NO RACE VARIABLE): 25 ML/MIN/1.73 M^2
GLUCOSE SERPL-MCNC: 116 MG/DL (ref 70–110)
HCT VFR BLD AUTO: 30.8 % (ref 37–48.5)
HGB BLD-MCNC: 9.9 G/DL (ref 12–16)
IMM GRANULOCYTES # BLD AUTO: 0.05 K/UL (ref 0–0.04)
IMM GRANULOCYTES NFR BLD AUTO: 0.6 % (ref 0–0.5)
LYMPHOCYTES # BLD AUTO: 0.8 K/UL (ref 1–4.8)
LYMPHOCYTES NFR BLD: 9.7 % (ref 18–48)
MCH RBC QN AUTO: 29.4 PG (ref 27–31)
MCHC RBC AUTO-ENTMCNC: 32.1 G/DL (ref 32–36)
MCV RBC AUTO: 91 FL (ref 82–98)
MONOCYTES # BLD AUTO: 0.5 K/UL (ref 0.3–1)
MONOCYTES NFR BLD: 5.6 % (ref 4–15)
NEUTROPHILS # BLD AUTO: 6.5 K/UL (ref 1.8–7.7)
NEUTROPHILS NFR BLD: 81.1 % (ref 38–73)
NRBC BLD-RTO: 0 /100 WBC
PLATELET # BLD AUTO: 285 K/UL (ref 150–450)
PMV BLD AUTO: 9.4 FL (ref 9.2–12.9)
POTASSIUM SERPL-SCNC: 3.5 MMOL/L (ref 3.5–5.1)
PROT SERPL-MCNC: 6 G/DL (ref 6–8.4)
RBC # BLD AUTO: 3.37 M/UL (ref 4–5.4)
SODIUM SERPL-SCNC: 135 MMOL/L (ref 136–145)
WBC # BLD AUTO: 8.04 K/UL (ref 3.9–12.7)

## 2024-01-12 PROCEDURE — 36415 COLL VENOUS BLD VENIPUNCTURE: CPT | Performed by: EMERGENCY MEDICINE

## 2024-01-12 PROCEDURE — 80053 COMPREHEN METABOLIC PANEL: CPT | Performed by: EMERGENCY MEDICINE

## 2024-01-12 PROCEDURE — 99285 EMERGENCY DEPT VISIT HI MDM: CPT | Mod: 25

## 2024-01-12 PROCEDURE — 85025 COMPLETE CBC W/AUTO DIFF WBC: CPT | Performed by: EMERGENCY MEDICINE

## 2024-01-12 PROCEDURE — 12013 RPR F/E/E/N/L/M 2.6-5.0 CM: CPT

## 2024-01-12 PROCEDURE — 25000003 PHARM REV CODE 250: Performed by: EMERGENCY MEDICINE

## 2024-01-12 RX ORDER — CEPHALEXIN 500 MG/1
500 CAPSULE ORAL 4 TIMES DAILY
Qty: 20 CAPSULE | Refills: 0 | Status: SHIPPED | OUTPATIENT
Start: 2024-01-12 | End: 2024-01-12

## 2024-01-12 RX ORDER — CEPHALEXIN 500 MG/1
500 CAPSULE ORAL 4 TIMES DAILY
Qty: 20 CAPSULE | Refills: 0 | Status: SHIPPED | OUTPATIENT
Start: 2024-01-12 | End: 2024-01-17

## 2024-01-12 RX ORDER — HYDROCODONE BITARTRATE AND ACETAMINOPHEN 5; 325 MG/1; MG/1
1 TABLET ORAL EVERY 6 HOURS PRN
Qty: 12 TABLET | Refills: 0 | Status: SHIPPED | OUTPATIENT
Start: 2024-01-12 | End: 2024-01-12

## 2024-01-12 RX ORDER — HYDROCODONE BITARTRATE AND ACETAMINOPHEN 5; 325 MG/1; MG/1
1 TABLET ORAL EVERY 6 HOURS PRN
Qty: 12 TABLET | Refills: 0 | Status: SHIPPED | OUTPATIENT
Start: 2024-01-12 | End: 2024-02-26 | Stop reason: SDUPTHER

## 2024-01-12 RX ORDER — LIDOCAINE HYDROCHLORIDE 10 MG/ML
10 INJECTION INFILTRATION; PERINEURAL
Status: COMPLETED | OUTPATIENT
Start: 2024-01-12 | End: 2024-01-12

## 2024-01-12 RX ORDER — ONDANSETRON 4 MG/1
4 TABLET, ORALLY DISINTEGRATING ORAL ONCE
Qty: 1 TABLET | Refills: 0 | Status: SHIPPED | OUTPATIENT
Start: 2024-01-12 | End: 2024-01-12

## 2024-01-12 RX ORDER — ONDANSETRON 4 MG/1
4 TABLET, ORALLY DISINTEGRATING ORAL
Status: COMPLETED | OUTPATIENT
Start: 2024-01-12 | End: 2024-01-12

## 2024-01-12 RX ORDER — HYDROCODONE BITARTRATE AND ACETAMINOPHEN 5; 325 MG/1; MG/1
1 TABLET ORAL
Status: COMPLETED | OUTPATIENT
Start: 2024-01-12 | End: 2024-01-12

## 2024-01-12 RX ADMIN — ONDANSETRON 4 MG: 4 TABLET, ORALLY DISINTEGRATING ORAL at 07:01

## 2024-01-12 RX ADMIN — HYDROCODONE BITARTRATE AND ACETAMINOPHEN 1 TABLET: 5; 325 TABLET ORAL at 05:01

## 2024-01-12 RX ADMIN — LIDOCAINE HYDROCHLORIDE 10 ML: 10 INJECTION, SOLUTION INFILTRATION; PERINEURAL at 04:01

## 2024-01-12 NOTE — ED PROVIDER NOTES
Encounter Date: 2024       History     Chief Complaint   Patient presents with    Fall     Fall at home; tripped face first.   6 in gash in forehead per ems.  Left knee swelling.     Facial Laceration     HPI patient is a 68-year-old woman who presents emergency department for evaluation a trip and fall hitting the floor with her forehead and left knee.  Patient is on Eliquis for blood thinners with a history of atrial fibrillation.  She complains of left knee pain laceration to her forehead.  She has a C-collar in place via EMS.  Review of patient's allergies indicates:  No Known Allergies  Past Medical History:   Diagnosis Date    Arthritis     Asthma     COPD (chronic obstructive pulmonary disease)     Encounter for blood transfusion     Hypertension     Wound infection 2019    Right knee     Past Surgical History:   Procedure Laterality Date    ANGIOGRAM, CORONARY, WITH LEFT HEART CATHETERIZATION N/A 2022    Procedure: Angiogram, Coronary, with Left Heart Cath;  Surgeon: Jorge Tran MD;  Location: [facility] CATH/EP LAB;  Service: Cardiology;  Laterality: N/A;     SECTION      ECHOCARDIOGRAM,TRANSESOPHAGEAL N/A 2023    Procedure: Transesophageal echo (MARIANO) intra-procedure log documentation;  Surgeon: Provider, Dos Diagnostic;  Location: [facility] EP LAB;  Service: Cardiology;  Laterality: N/A;    JOINT REPLACEMENT      Knee    KNEE ARTHROPLASTY Right 2019    Procedure: ARTHROPLASTY, KNEE;  Surgeon: Delio Chung MD;  Location: [facility] OR;  Service: Orthopedics;  Laterality: Right;  anesthesia:  general and block    REPLACEMENT OF WOUND VACUUM-ASSISTED CLOSURE DEVICE Right 2019    Procedure: REPLACEMENT, WOUND VAC;  Surgeon: Delio Chung MD;  Location: [facility] OR;  Service: Orthopedics;  Laterality: Right;    TONSILLECTOMY      TREATMENT OF CARDIAC ARRHYTHMIA N/A 2023    Procedure: Cardioversion or Defibrillation;  Surgeon: PJ Betancourt MD;  Location: [facility] EP LAB;   Service: Cardiology;  Laterality: N/A;  AF, MARIANO, DCCV, MAC, EH, 3 Prep    VENTRICULOGRAM, LEFT  12/23/2022    Procedure: Ventriculogram, Left;  Surgeon: Jorge Tran MD;  Location: [facility] CATH/EP LAB;  Service: Cardiology;;     Family History   Problem Relation Age of Onset    Alzheimer's disease Mother      Social History     Tobacco Use    Smoking status: Every Day     Current packs/day: 0.25     Average packs/day: 0.5 packs/day for 49.0 years (23.5 ttl pk-yrs)     Types: Cigarettes     Start date: 1975     Passive exposure: Current    Smokeless tobacco: Never   Substance Use Topics    Alcohol use: Yes     Comment: RARELY    Drug use: Never     Review of Systems   Constitutional:  Negative for fever.   HENT:  Negative for sore throat.    Respiratory:  Negative for shortness of breath.    Cardiovascular:  Negative for chest pain.   Gastrointestinal:  Negative for nausea.   Genitourinary:  Negative for dysuria.   Musculoskeletal:  Positive for arthralgias and neck pain. Negative for back pain.   Skin:  Positive for wound. Negative for rash.   Neurological:  Positive for headaches. Negative for weakness.   Hematological:  Does not bruise/bleed easily.       Physical Exam     Initial Vitals [01/12/24 1629]   BP Pulse Resp Temp SpO2   (!) 159/96 103 18 97.6 °F (36.4 °C) 98 %      MAP       --         Physical Exam    Nursing note and vitals reviewed.  Constitutional: She appears well-developed and well-nourished. No distress. Cervical collar in place.   HENT:   Head: Normocephalic. Head is without raccoon's eyes and without Gamboa's sign.   Mouth/Throat: No oropharyngeal exudate.   5 cm laceration across the forehead with steady active arterial bleeding.   Eyes: EOM are normal. Pupils are equal, round, and reactive to light.   Neck: Neck supple.   C-collar in place.  Patient with midline neck tenderness.   Cardiovascular:  Normal rate and regular rhythm.           Pulmonary/Chest: Breath sounds normal. No respiratory  distress. She has no wheezes.   Abdominal: Abdomen is soft. She exhibits no distension. There is no abdominal tenderness.   Musculoskeletal:         General: Tenderness (tenderness over the left lateral hip as well.) present.      Cervical back: Neck supple. Spinous process tenderness and muscular tenderness present.      Comments: Swelling with diffuse bruising over the left knee.  There is tenderness about patient over the tibia, medial joint line and patella.  Unable to test for laxity T secondary to pain.  Capillary refill is brisk with distal pulses intact distally.  Sensation intact.     Neurological: She is alert and oriented to person, place, and time. No cranial nerve deficit. GCS score is 15. GCS eye subscore is 4. GCS verbal subscore is 5. GCS motor subscore is 6.   Skin: Skin is warm. Capillary refill takes less than 2 seconds.         ED Course   Lac Repair    Date/Time: 1/12/2024 5:20 PM    Performed by: Joey Mckeon MD  Authorized by: Joey Mckeon MD    Consent:     Consent obtained:  Verbal    Consent given by:  Patient    Risks, benefits, and alternatives were discussed: yes      Risks discussed:  Poor cosmetic result, infection and vascular damage  Universal protocol:     Patient identity confirmed:  Verbally with patient  Anesthesia:     Anesthesia method:  Local infiltration    Local anesthetic:  Lidocaine 1% w/o epi  Laceration details:     Location:  Face    Face location:  Forehead    Length (cm):  5  Pre-procedure details:     Preparation:  Patient was prepped and draped in usual sterile fashion and imaging obtained to evaluate for foreign bodies  Exploration:     Wound extent: areolar tissue violated, fascia violated and vascular damage      Wound extent: no foreign bodies/material noted and no underlying fracture noted      Contaminated: no    Treatment:     Area cleansed with:  Saline and Shur-Clens    Amount of cleaning:  Standard    Irrigation solution:  Sterile saline     Irrigation method:  Syringe  Skin repair:     Repair method:  Sutures    Suture size:  4-0    Suture material:  Nylon    Suture technique:  Simple interrupted    Number of sutures: 11 +/-  Approximation:     Approximation:  Close  Repair type:     Repair type:  Simple  Post-procedure details:     Dressing:  Bulky dressing    Procedure completion:  Tolerated well, no immediate complications    Labs Reviewed   CBC W/ AUTO DIFFERENTIAL - Abnormal; Notable for the following components:       Result Value    RBC 3.37 (*)     Hemoglobin 9.9 (*)     Hematocrit 30.8 (*)     RDW 15.9 (*)     Immature Granulocytes 0.6 (*)     Immature Grans (Abs) 0.05 (*)     Lymph # 0.8 (*)     Gran % 81.1 (*)     Lymph % 9.7 (*)     All other components within normal limits   COMPREHENSIVE METABOLIC PANEL - Abnormal; Notable for the following components:    Sodium 135 (*)     CO2 21 (*)     Glucose 116 (*)     BUN 28 (*)     Creatinine 2.1 (*)     Calcium 8.4 (*)     Albumin 3.1 (*)     eGFR 25 (*)     All other components within normal limits          Imaging Results              CT Cervical Spine Without Contrast (Final result)  Result time 01/13/24 07:36:10      Final result by Jeremiah Stahl MD (01/13/24 07:36:10)                   Impression:      1. Mild malalignment including anterolisthesis C2 on C3.  No acute fracture.  2. Multilevel spinal degenerative changes contributing to areas of spinal canal and bony neural foraminal narrowing as described.  3. Interstitial disease at the lung apices, partially imaged.  Mild pulmonary edema is a consideration.      Electronically signed by: Jeremiah Stahl  Date:    01/13/2024  Time:    07:36               Narrative:    EXAMINATION:  CT CERVICAL SPINE WITHOUT CONTRAST    CLINICAL HISTORY:  Neck trauma (Age >= 65y);    TECHNIQUE:  Low dose axial images, sagittal and coronal reformations were performed though the cervical spine.  Contrast was not  administered.    COMPARISON:  12/05/2023    FINDINGS:  There is 2 mm anterolisthesis C2 on C3 and straightening of normal cervical lordosis.  No acute fracture. Vertebral body heights maintained. There is moderate degenerative change anterior C1-2 articulation.  There is degenerative disc disease severe at C3-4 and T1-2 and moderate at C4-5 through C6-7.  Discs are prominent posteriorly at C2-3 and C3-4 contributing a degree canal stenosis.  Uncovertebral spur and facet osteophytes contribute to bony neural foraminal narrowing on the right at C4-5 through C6-7 and on the left at C3-4 through C6-7. Osteopenia and atherosclerosis.  There is some interlobular septal thickening at the lung apices.  Subcentimeter nodule or cyst right thyroid lobe.                                       CT Head Without Contrast (Final result)  Result time 01/13/24 07:30:56      Final result by Jeremiah Stahl MD (01/13/24 07:30:56)                   Impression:      No acute intracranial findings.  Soft tissue injury frontal scalp.  Mild involutional change and white matter disease.      Electronically signed by: Jeremiah Stahl  Date:    01/13/2024  Time:    07:30               Narrative:    EXAMINATION:  CT HEAD WITHOUT CONTRAST    CLINICAL HISTORY:  Head trauma, minor (Age >= 65y);    TECHNIQUE:  Low dose axial images were obtained through the head.  Coronal and sagittal reformations were also performed. Contrast was not administered.    COMPARISON:  None.    FINDINGS:  There are involutional changes with normal size of the ventricular system. Periventricular white matter hypoattenuation is nonspecific but suggestive of chronic microvascular ischemic change.  No mass, hemorrhage or acute major vascular distribution infarct. No mass effect or midline shift. Included orbits are unremarkable. Mild mucosal thickening in the ethmoid sinuses.  Atherosclerosis.  Subcutaneous soft tissue swelling and hemorrhage punctate gas along the  midline frontal scalp.  Partially empty sella turcica.  No acute osseous abnormality.                                       X-Ray Knee 3 View Left (Final result)  Result time 01/13/24 10:37:51      Final result by Jeremiah Stahl MD (01/13/24 10:37:51)                   Narrative:    EXAMINATION:  XR KNEE 3 VIEW LEFT    CLINICAL HISTORY:  Pain in left knee    TECHNIQUE:  AP, lateral, and Merchant views of the left knee were performed.    COMPARISON:  03/26/2019    FINDINGS:  No displaced fracture.  Degree of osteopenia limits assessment for nondisplaced fracture.  No malalignment.  Tricompartmental degenerative change, between mild-to-moderate severity.  Small joint effusion.  Marked anterior knee soft tissue swelling directly over the patella.  Atherosclerosis.      Electronically signed by: Jeremiah Stahl  Date:    01/13/2024  Time:    10:37                                     X-Ray Hip 2 or 3 views Left (with Pelvis when performed) (Final result)  Result time 01/13/24 10:35:42      Final result by Jeremiah Stahl MD (01/13/24 10:35:42)                   Narrative:    EXAMINATION:  XR HIP WITH PELVIS WHEN PERFORMED, 2 OR 3 VIEWS LEFT    CLINICAL HISTORY:  Pain in left hip    TECHNIQUE:  AP view of the pelvis and frog leg lateral view of the left hip were performed.    COMPARISON:  None    FINDINGS:  There are generous overlapping soft tissues over the bony pelvis, and bowel contents which overlie the lower lumbar spine and sacrum, both which limit the assessment for nondisplaced fracture.    No displaced fracture allowing for limitations.  There is no traumatic malalignment.  There is levocurvature and multilevel degenerative changes in the lower lumbar spine.  Mild degenerative change pubic symphysis.  Atherosclerosis.      Electronically signed by: Jeremiah Stahl  Date:    01/13/2024  Time:    10:35                                     Medications   LIDOcaine HCL 10 mg/ml (1%) injection 10 mL (10 mLs  Infiltration Given 1/12/24 1645)   HYDROcodone-acetaminophen 5-325 mg per tablet 1 tablet (1 tablet Oral Given 1/12/24 1714)   ondansetron disintegrating tablet 4 mg (4 mg Oral Given 1/12/24 1930)     Medical Decision Making  68-year-old woman who presents emergency department for evaluation a trip and fall hitting the floor with her forehead and left knee.  Patient is on Eliquis for blood thinners with a history of atrial fibrillation.  She complains of left knee pain laceration to her forehead.  She has a C-collar in place via EMS.  Exam reveals a forehead laceration and tenderness of the left hip and knee with bruising and swelling of the left knee.  DDX : cranial fracture, ICH, knee contusion, fracture, hip contusion/fracture, Acute Blood loss anemia  Laceration attended to promptly since appeared to have ongoing arterial bleeding whith removal of pressure bandage. Hematoma evacuated and wound clensed. Unable to see base appropriately as wound was not wide open and tethered byconnective tissue and active bleeding. No foreign body appreciated. Hemostasis achieved with direct wound closure via sutures and application of compression dressing to reduce hematoma size.  CT cervical spine and head without fracture, significant vertebral dislocation, or ICH. Ccollar cleared. Xrays of hip and knee obtained and interpreted by me with ho discernable fracture or dislocation. Pt was able to stand and ambulate on reassessment. She wishes to go wome with her daughter and  who agree to care for her. Return precautions and wound care provided. Rx for abx, pain meds and antiemetics. Return in 10 days for suture removal or sooner for any concerns.    Amount and/or Complexity of Data Reviewed  Labs: ordered.  Radiology: ordered.    Risk  Prescription drug management.                                      Clinical Impression:  Final diagnoses:  [M25.552] Left hip pain  [M25.562] Left knee pain  [S01.81XA] Laceration of  forehead, initial encounter (Primary)  [S80.02XA] Contusion of left knee, initial encounter          ED Disposition Condition    Discharge Stable          ED Prescriptions       Medication Sig Dispense Start Date End Date Auth. Provider    cephALEXin (KEFLEX) 500 MG capsule  (Status: Discontinued) Take 1 capsule (500 mg total) by mouth 4 (four) times daily. for 5 days 20 capsule 2024 Joey Mckeon MD    HYDROcodone-acetaminophen (NORCO) 5-325 mg per tablet  (Status: Discontinued) Take 1 tablet by mouth every 6 (six) hours as needed for Pain. 12 tablet 2024 Joey Mckeon MD    ondansetron (ZOFRAN-ODT) 4 MG TbDL  (Status: Discontinued) Take 1 tablet (4 mg total) by mouth once. for 1 dose 1 tablet 2024 Joey Mckeon MD    cephALEXin (KEFLEX) 500 MG capsule Take 1 capsule (500 mg total) by mouth 4 (four) times daily. for 5 days 20 capsule 2024 Joey Mckeon MD    HYDROcodone-acetaminophen (NORCO) 5-325 mg per tablet Take 1 tablet by mouth every 6 (six) hours as needed for Pain. 12 tablet 2024 -- Joey Mckeon MD    ondansetron (ZOFRAN-ODT) 4 MG TbDL () Take 1 tablet (4 mg total) by mouth once. for 1 dose 1 tablet 2024 Joey Mckeon MD          Follow-up Information       Follow up With Specialties Details Why Contact Info Additional Information    Elkin You MD Family Medicine, Home Health Services, Hospice Services In 3 days  [address] [facility]  [address] [place] [id number]  [phone]       [facility] -  Emergency Medicine In 10 days For suture removal [address] [place] [address] Dr [place] [address] 1st floor             Joey Mckeon MD  [phone]

## 2024-01-16 ENCOUNTER — TELEPHONE (OUTPATIENT)
Dept: FAMILY MEDICINE | Facility: CLINIC | Age: 69
End: 2024-01-16
Payer: MEDICARE

## 2024-01-16 NOTE — TELEPHONE ENCOUNTER
Spoke with patient and went to emergency room on Friday and has 15 sutures in forehead. Patient wanted to follow up with Dr. You to get her sutures removed. Patient was okay with seeing Nurse Practitioner Kera next week to an emergency room follow up. Patient has been scheduled.
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
## 2024-01-22 ENCOUNTER — OFFICE VISIT (OUTPATIENT)
Dept: FAMILY MEDICINE | Facility: CLINIC | Age: 69
End: 2024-01-22
Payer: MEDICARE

## 2024-01-22 VITALS
BODY MASS INDEX: 45.12 KG/M2 | WEIGHT: 239 LBS | SYSTOLIC BLOOD PRESSURE: 148 MMHG | HEIGHT: 61 IN | HEART RATE: 92 BPM | DIASTOLIC BLOOD PRESSURE: 76 MMHG | OXYGEN SATURATION: 98 %

## 2024-01-22 DIAGNOSIS — S01.81XD LACERATION OF FOREHEAD, SUBSEQUENT ENCOUNTER: Primary | ICD-10-CM

## 2024-01-22 DIAGNOSIS — N18.4 HYPERTENSIVE KIDNEY DISEASE WITH STAGE 4 CHRONIC KIDNEY DISEASE: ICD-10-CM

## 2024-01-22 DIAGNOSIS — T14.8XXA INFECTED HEMATOMA: ICD-10-CM

## 2024-01-22 DIAGNOSIS — I48.19 PERSISTENT ATRIAL FIBRILLATION: ICD-10-CM

## 2024-01-22 DIAGNOSIS — L08.9 INFECTED HEMATOMA: ICD-10-CM

## 2024-01-22 DIAGNOSIS — I12.9 HYPERTENSIVE KIDNEY DISEASE WITH STAGE 4 CHRONIC KIDNEY DISEASE: ICD-10-CM

## 2024-01-22 PROCEDURE — 99214 OFFICE O/P EST MOD 30 MIN: CPT | Mod: S$GLB,,, | Performed by: NURSE PRACTITIONER

## 2024-01-22 RX ORDER — CLINDAMYCIN HYDROCHLORIDE 300 MG/1
300 CAPSULE ORAL 3 TIMES DAILY
Qty: 30 CAPSULE | Refills: 0 | Status: SHIPPED | OUTPATIENT
Start: 2024-01-22 | End: 2024-02-01

## 2024-01-22 RX ORDER — MUPIROCIN 20 MG/G
OINTMENT TOPICAL 2 TIMES DAILY
Qty: 22 G | Refills: 1 | Status: SHIPPED | OUTPATIENT
Start: 2024-01-22 | End: 2024-02-22

## 2024-01-22 NOTE — PROGRESS NOTES
SUBJECTIVE:    Patient ID: Iris Mueller is a 68 y.o. female.    Chief Complaint: Follow-up (No bottles//Pt is here for a ER f/u from a fall and a suture removal//Pt also needs medication refillsJES )    Patient here for ER follow-up. Pt here with her daughter and  today.    Presented to ER January 12th after tripped and fell and struck her forehead on the ground- suffered forehead laceration and left knee contusion. Pt on eliquis for Afib and reports had significant bleeding from laceration. Presented to ER via EMS and had laceration sutured. CT head/neck negative for acute findings, knee xray negative for fracture. Pt reports overall since fall she's doing okay. She developed large amt of bruising to face which is slowly resolving- still has hematoma to forehead which is soft, no drainage or erythema. Left knee hematoma/swelling persists and area has become red and warm the past few days- pt reports she developed 2 large blisters to the knee which have now popped and has had small amt of serous fluid draining. Pt reports knee pain continues though has improved some since ER- using walker most of the time. Denies any fever/chills. Was discharged on 5 days of keflex which she completed        Admission on 01/12/2024, Discharged on 01/12/2024   Component Date Value Ref Range Status    WBC 01/12/2024 8.04  3.90 - 12.70 K/uL Final    RBC 01/12/2024 3.37 (L)  4.00 - 5.40 M/uL Final    Hemoglobin 01/12/2024 9.9 (L)  12.0 - 16.0 g/dL Final    Hematocrit 01/12/2024 30.8 (L)  37.0 - 48.5 % Final    MCV 01/12/2024 91  82 - 98 fL Final    MCH 01/12/2024 29.4  27.0 - 31.0 pg Final    MCHC 01/12/2024 32.1  32.0 - 36.0 g/dL Final    RDW 01/12/2024 15.9 (H)  11.5 - 14.5 % Final    Platelets 01/12/2024 285  150 - 450 K/uL Final    MPV 01/12/2024 9.4  9.2 - 12.9 fL Final    Immature Granulocytes 01/12/2024 0.6 (H)  0.0 - 0.5 % Final    Gran # (ANC) 01/12/2024 6.5  1.8 - 7.7 K/uL Final    Immature Grans (Abs)  01/12/2024 0.05 (H)  0.00 - 0.04 K/uL Final    Lymph # 01/12/2024 0.8 (L)  1.0 - 4.8 K/uL Final    Mono # 01/12/2024 0.5  0.3 - 1.0 K/uL Final    Eos # 01/12/2024 0.2  0.0 - 0.5 K/uL Final    Baso # 01/12/2024 0.05  0.00 - 0.20 K/uL Final    nRBC 01/12/2024 0  0 /100 WBC Final    Gran % 01/12/2024 81.1 (H)  38.0 - 73.0 % Final    Lymph % 01/12/2024 9.7 (L)  18.0 - 48.0 % Final    Mono % 01/12/2024 5.6  4.0 - 15.0 % Final    Eosinophil % 01/12/2024 2.4  0.0 - 8.0 % Final    Basophil % 01/12/2024 0.6  0.0 - 1.9 % Final    Differential Method 01/12/2024 Automated   Final    Sodium 01/12/2024 135 (L)  136 - 145 mmol/L Final    Potassium 01/12/2024 3.5  3.5 - 5.1 mmol/L Final    Chloride 01/12/2024 103  95 - 110 mmol/L Final    CO2 01/12/2024 21 (L)  23 - 29 mmol/L Final    Glucose 01/12/2024 116 (H)  70 - 110 mg/dL Final    BUN 01/12/2024 28 (H)  8 - 23 mg/dL Final    Creatinine 01/12/2024 2.1 (H)  0.5 - 1.4 mg/dL Final    Calcium 01/12/2024 8.4 (L)  8.7 - 10.5 mg/dL Final    Total Protein 01/12/2024 6.0  6.0 - 8.4 g/dL Final    Albumin 01/12/2024 3.1 (L)  3.5 - 5.2 g/dL Final    Total Bilirubin 01/12/2024 0.6  0.1 - 1.0 mg/dL Final    Alkaline Phosphatase 01/12/2024 95  55 - 135 U/L Final    AST 01/12/2024 16  10 - 40 U/L Final    ALT 01/12/2024 15  10 - 44 U/L Final    eGFR 01/12/2024 25 (A)  >60 mL/min/1.73 m^2 Final    Anion Gap 01/12/2024 11  8 - 16 mmol/L Final   Hospital Outpatient Visit on 08/31/2023   Component Date Value Ref Range Status    BSA 08/31/2023 2.14  m2 Final    Sinus 08/31/2023 3.2  cm Final    STJ 08/31/2023 2.4  cm Final    Ascending aorta 08/31/2023 3.7  cm Final   Orders Only on 08/21/2023   Component Date Value Ref Range Status    aPTT 08/28/2023 33 (H)  23 - 32 sec Final    Glucose 08/28/2023 84  65 - 99 mg/dL Final    BUN 08/28/2023 21  7 - 25 mg/dL Final    Creatinine 08/28/2023 2.00 (H)  0.50 - 1.05 mg/dL Final    eGFR 08/28/2023 27 (L)  > OR = 60 mL/min/1.73m2 Final    BUN/Creatinine  Ratio 2023 11  6 - 22 (calc) Final    Sodium 2023 138  135 - 146 mmol/L Final    Potassium 2023 4.2  3.5 - 5.3 mmol/L Final    Chloride 2023 103  98 - 110 mmol/L Final    CO2 2023 26  20 - 32 mmol/L Final    Calcium 2023 9.5  8.6 - 10.4 mg/dL Final    WBC 2023 8.0  3.8 - 10.8 Thousand/uL Final    RBC 2023 4.19  3.80 - 5.10 Million/uL Final    Hemoglobin 2023 12.7  11.7 - 15.5 g/dL Final    Hematocrit 2023 39.1  35.0 - 45.0 % Final    MCV 2023 93.3  80.0 - 100.0 fL Final    MCH 2023 30.3  27.0 - 33.0 pg Final    MCHC 2023 32.5  32.0 - 36.0 g/dL Final    RDW 2023 14.1  11.0 - 15.0 % Final    Platelets 2023 289  140 - 400 Thousand/uL Final    MPV 2023 9.2  7.5 - 12.5 fL Final    Neutrophils, Abs 2023 6,144  1,500 - 7,800 cells/uL Final    Lymph # 2023 1,040  850 - 3,900 cells/uL Final    Mono # 2023 544  200 - 950 cells/uL Final    Eos # 2023 224  15 - 500 cells/uL Final    Baso # 2023 48  0 - 200 cells/uL Final    Neutrophils Relative 2023 76.8  % Final    Lymph % 2023 13.0  % Final    Mono % 2023 6.8  % Final    Eosinophil % 2023 2.8  % Final    Basophil % 2023 0.6  % Final    INR 2023 1.1   Final    Prothrombin Time 2023 11.7 (H)  9.0 - 11.5 sec Final       Past Medical History:   Diagnosis Date    Arthritis     Asthma     COPD (chronic obstructive pulmonary disease)     Encounter for blood transfusion     Hypertension     Wound infection 2019    Right knee     Past Surgical History:   Procedure Laterality Date    ANGIOGRAM, CORONARY, WITH LEFT HEART CATHETERIZATION N/A 2022    Procedure: Angiogram, Coronary, with Left Heart Cath;  Surgeon: Jorge rTan MD;  Location: [facility] CATH/EP LAB;  Service: Cardiology;  Laterality: N/A;     SECTION      ECHOCARDIOGRAM,TRANSESOPHAGEAL N/A 2023    Procedure: Transesophageal echo (MARIANO)  intra-procedure log documentation;  Surgeon: Provider, Dosc [facility];  Location: [facility] EP LAB;  Service: Cardiology;  Laterality: N/A;    JOINT REPLACEMENT      Knee    KNEE ARTHROPLASTY Right 8/14/2019    Procedure: ARTHROPLASTY, KNEE;  Surgeon: Delio Chung MD;  Location: [facility] OR;  Service: Orthopedics;  Laterality: Right;  anesthesia:  general and block    REPLACEMENT OF WOUND VACUUM-ASSISTED CLOSURE DEVICE Right 9/12/2019    Procedure: REPLACEMENT, WOUND VAC;  Surgeon: Delio Chung MD;  Location: [facility] OR;  Service: Orthopedics;  Laterality: Right;    TONSILLECTOMY      TREATMENT OF CARDIAC ARRHYTHMIA N/A 8/31/2023    Procedure: Cardioversion or Defibrillation;  Surgeon: PJ Betancourt MD;  Location: [facility] EP LAB;  Service: Cardiology;  Laterality: N/A;  AF, MARIANO, DCCV, MAC, EH, 3 Prep    VENTRICULOGRAM, LEFT  12/23/2022    Procedure: Ventriculogram, Left;  Surgeon: Jorge Tran MD;  Location: [facility] CATH/EP LAB;  Service: Cardiology;;     Family History   Problem Relation Age of Onset    Alzheimer's disease Mother        Tests to Keep You Healthy    Mammogram: Met on 12/5/2023  Colon Cancer Screening: ORDERED  Last Blood Pressure <= 139/89 (1/22/2024): NO  Tobacco Cessation: NO      The CVD Risk score (D'Agostino, et al., 2008) failed to calculate for the following reasons:    The patient has a prior MI, stroke, CHF, or peripheral vascular disease diagnosis     Marital Status:   Alcohol History:  reports current alcohol use.  Tobacco History:  reports that she has been smoking cigarettes. She started smoking about 49 years ago. She has a 23.5 pack-year smoking history. She has been exposed to tobacco smoke. She has never used smokeless tobacco.  Drug History:  reports no history of drug use.    Health Maintenance Topics with due status: Not Due       Topic Last Completion Date    TETANUS VACCINE 09/18/2019    Hemoglobin A1c (Diabetic Prevention Screening) 12/23/2022    Lipid Panel 05/03/2023     LDCT Lung Screen 12/05/2023    Mammogram 12/05/2023    DEXA Scan 12/05/2023     Immunization History   Administered Date(s) Administered    COVID-19, MRNA, LN-S, PF (MODERNA FULL 0.5 ML DOSE) 02/26/2021, 03/26/2021, 12/14/2021    Influenza (FLUAD) - Quadrivalent - Adjuvanted - PF *Preferred* (65+) 11/09/2023    Influenza - High Dose - PF (65 years and older) 09/18/2019    Influenza - Quadrivalent - High Dose - PF (65 years and older) 08/20/2020, 10/25/2021, 11/08/2022    PPD Test 09/04/2019    Pneumococcal Conjugate - 13 Valent 03/06/2019    Pneumococcal Polysaccharide - 23 Valent 07/21/2020    Tdap 09/18/2019       Review of patient's allergies indicates:  No Known Allergies    Current Outpatient Medications:     albuterol (PROVENTIL/VENTOLIN HFA) 90 mcg/actuation inhaler, Inhale 2 puffs into the lungs every 6 (six) hours as needed for Wheezing. Rescue, Disp: 18 g, Rfl: 2    amiodarone (PACERONE) 200 MG Tab, Take 1 tablet (200 mg total) by mouth once daily., Disp: 30 tablet, Rfl: 11    amLODIPine (NORVASC) 2.5 MG tablet, Take 1 tablet (2.5 mg total) by mouth every evening., Disp: 90 tablet, Rfl: 3    apixaban (ELIQUIS) 5 mg Tab, Take 1 tablet (5 mg total) by mouth 2 (two) times daily., Disp: 180 tablet, Rfl: 3    atorvastatin (LIPITOR) 10 MG tablet, Take 1 tablet (10 mg total) by mouth every evening., Disp: 90 tablet, Rfl: 3    betamethasone dipropionate 0.05 % cream, Apply topically 2 (two) times daily., Disp: 45 g, Rfl: 2    buPROPion (WELLBUTRIN SR) 150 MG TBSR 12 hr tablet, Take 1 tablet (150 mg total) by mouth 2 (two) times daily., Disp: 180 tablet, Rfl: 3    clindamycin (CLEOCIN) 300 MG capsule, Take 1 capsule (300 mg total) by mouth 3 (three) times daily. for 10 days, Disp: 30 capsule, Rfl: 0    ergocalciferol (ERGOCALCIFEROL) 50,000 unit Cap, Take 50,000 Units by mouth every 7 days., Disp: , Rfl:     ferrous sulfate (FEOSOL) 325 mg (65 mg iron) Tab tablet, Take 1 tablet (325 mg total) by mouth once  daily., Disp: , Rfl: 0    FLUoxetine 40 MG capsule, Take 1 capsule (40 mg total) by mouth once daily., Disp: 90 capsule, Rfl: 3    furosemide (LASIX) 20 MG tablet, Take 20 mg by mouth daily as needed for Shortness of Breath., Disp: , Rfl:     gabapentin (NEURONTIN) 300 MG capsule, Take 1 capsule (300 mg total) by mouth 3 (three) times daily. (Patient not taking: Reported on 1/5/2024), Disp: 270 capsule, Rfl: 1    HYDROcodone-acetaminophen (NORCO) 5-325 mg per tablet, Take 1 tablet by mouth every 6 (six) hours as needed for Pain., Disp: 12 tablet, Rfl: 0    loratadine (CLARITIN) 10 mg tablet, Take 10 mg by mouth once daily., Disp: , Rfl:     metoprolol succinate (TOPROL-XL) 25 MG 24 hr tablet, Take 1 tablet (25 mg total) by mouth once daily., Disp: 90 tablet, Rfl: 3    mupirocin (BACTROBAN) 2 % ointment, Apply topically 2 (two) times daily., Disp: 22 g, Rfl: 1    traZODone (DESYREL) 50 MG tablet, Take 2 tablets (100 mg total) by mouth nightly as needed for Insomnia., Disp: 180 tablet, Rfl: 1    triamcinolone acetonide 0.1% (KENALOG) 0.1 % cream, Apply topically 2 (two) times daily., Disp: 45 g, Rfl: 2  No current facility-administered medications for this visit.    Facility-Administered Medications Ordered in Other Visits:     lidocaine (PF) 10 mg/ml (1%) injection 5 mg, 0.5 mL, Intradermal, Once, Azeem Anderson MD    Review of Systems   Constitutional:  Negative for appetite change, chills and fever.   HENT:  Positive for facial swelling (forehead hematoma).    Eyes:  Negative for visual disturbance.   Respiratory:  Positive for shortness of breath (only with heavy exertion). Negative for cough and wheezing.    Cardiovascular:  Negative for chest pain, palpitations and leg swelling.   Genitourinary:  Positive for frequency. Negative for dysuria and hematuria.   Musculoskeletal:  Positive for joint swelling (left knee swelling).   Skin:  Positive for color change (left knee erythematous) and wound (forehead  "lac).   Neurological:  Negative for dizziness, syncope, speech difficulty and headaches.   Hematological:  Bruises/bleeds easily.          Objective:      Vitals:    01/22/24 1153 01/22/24 1158   BP: (!) 142/70 (!) 148/76   Pulse: 92    SpO2: 98%    Weight: 108.4 kg (239 lb)    Height: 5' 0.5" (1.537 m)      Physical Exam  Vitals and nursing note reviewed.   Constitutional:       General: She is not in acute distress.     Appearance: She is well-developed.      Comments: Morbidly obese white female, appears older than stated age   HENT:      Head: Normocephalic.        Comments: Fading ecchymosis to face/neck  Neck:      Vascular: No carotid bruit.   Cardiovascular:      Rate and Rhythm: Normal rate. Rhythm irregular.      Heart sounds: Murmur (2nd ICS RSB) heard.      Systolic murmur is present with a grade of 3/6.      No friction rub. No gallop.   Pulmonary:      Effort: Pulmonary effort is normal. No respiratory distress.      Breath sounds: Normal breath sounds. No wheezing or rales.   Abdominal:      General: There is no distension.      Palpations: Abdomen is soft.      Tenderness: There is no abdominal tenderness.   Musculoskeletal:      Cervical back: Neck supple.      Left knee: Swelling (palpable hematoma with erythema/warmth) and erythema present. Decreased range of motion (flexion limited to 80-90 degrees). Tenderness present.      Right lower leg: No edema.      Left lower leg: No edema.   Lymphadenopathy:      Cervical: No cervical adenopathy.   Skin:     General: Skin is warm and dry.      Findings: No rash.   Neurological:      General: No focal deficit present.      Mental Status: She is alert and oriented to person, place, and time.      Gait: Gait abnormal (antalgic gait).                 Assessment:       1. Laceration of forehead, subsequent encounter    2. Infected hematoma    3. Persistent atrial fibrillation    4. Hypertensive kidney disease with stage 4 chronic kidney disease         "   Plan:       1. Laceration of forehead, subsequent encounter  -#11 sutures removed without difficulty. Advised to clean site daily and apply mupirocin  -     mupirocin (BACTROBAN) 2 % ointment; Apply topically 2 (two) times daily.  Dispense: 22 g; Refill: 1    2. Infected hematoma  -left knee hematoma appears to have early infection with erythema/warmth- will add clindamycin and pt/family cautioned to call for any worsening in redness, swelling or pain and may need tap- f/u 1 week  -     clindamycin (CLEOCIN) 300 MG capsule; Take 1 capsule (300 mg total) by mouth 3 (three) times daily. for 10 days  Dispense: 30 capsule; Refill: 0    3. Persistent Afib   -rate stable, pt held eliquis for 24 hours after fall and now back taking daily    4. Hypertensive kidney disease with stage 4 CKD   -BP slightly elevated today, pt advised to monitor BP and if BP consistently >135/85 needs med adjustment. Pt has establish with Dr. Deng    Follow up in about 1 week (around 1/29/2024) for wound check.            1/22/2024 Roselyn Cote NP

## 2024-01-29 ENCOUNTER — OFFICE VISIT (OUTPATIENT)
Dept: FAMILY MEDICINE | Facility: CLINIC | Age: 69
End: 2024-01-29
Payer: MEDICARE

## 2024-01-29 VITALS
OXYGEN SATURATION: 97 % | BODY MASS INDEX: 48.49 KG/M2 | DIASTOLIC BLOOD PRESSURE: 80 MMHG | SYSTOLIC BLOOD PRESSURE: 140 MMHG | HEART RATE: 90 BPM | HEIGHT: 60 IN | WEIGHT: 247 LBS

## 2024-01-29 DIAGNOSIS — L08.9 INFECTED HEMATOMA: ICD-10-CM

## 2024-01-29 DIAGNOSIS — T14.8XXA INFECTED HEMATOMA: ICD-10-CM

## 2024-01-29 DIAGNOSIS — S80.02XD CONTUSION OF LEFT KNEE, SUBSEQUENT ENCOUNTER: Primary | ICD-10-CM

## 2024-01-29 DIAGNOSIS — N18.4 HYPERTENSIVE KIDNEY DISEASE WITH STAGE 4 CHRONIC KIDNEY DISEASE: ICD-10-CM

## 2024-01-29 DIAGNOSIS — I12.9 HYPERTENSIVE KIDNEY DISEASE WITH STAGE 4 CHRONIC KIDNEY DISEASE: ICD-10-CM

## 2024-01-29 PROCEDURE — 99213 OFFICE O/P EST LOW 20 MIN: CPT | Mod: S$GLB,,, | Performed by: NURSE PRACTITIONER

## 2024-01-29 RX ORDER — METOPROLOL SUCCINATE 25 MG/1
50 TABLET, EXTENDED RELEASE ORAL DAILY
Qty: 180 TABLET | Refills: 3 | Status: SHIPPED | OUTPATIENT
Start: 2024-01-29 | End: 2024-02-22

## 2024-01-29 NOTE — PATIENT INSTRUCTIONS
Bryson Huerta MD- orthopedic  [address] [place] [address] [address]  [facility] [address]  Phone: [phone]    Increase metoprolol to 2 tablets once a day for blood pressure

## 2024-01-29 NOTE — PROGRESS NOTES
SUBJECTIVE:    Patient ID: Iris Mueller is a 68 y.o. female.    Chief Complaint: Follow-up (Pt is coming in for wound check on left knee/ left knee is painful level is 5 still bleeding hurts to lift leg up)    Patient here for 1 week f/u- seen last week for ER f/u- had tripped and fallen on 1/12 striking forehead and left knee on ground. Had forehead laceration that required suturing and left knee hematoma. At last week's visit sutures removed. Left knee hematoma was very painful and red/hot- started on clindamycin    Pt returns today stating she thinks her knee may be slightly better, not quite as red though still quite swollen and painful. Has scant amt of sang fluid draining from knee. Denies any fever/chills.     Pt reports she's supposed to be in a wedding in 3 weeks and worried she won't be able to walk down the aisle                Admission on 01/12/2024, Discharged on 01/12/2024   Component Date Value Ref Range Status    WBC 01/12/2024 8.04  3.90 - 12.70 K/uL Final    RBC 01/12/2024 3.37 (L)  4.00 - 5.40 M/uL Final    Hemoglobin 01/12/2024 9.9 (L)  12.0 - 16.0 g/dL Final    Hematocrit 01/12/2024 30.8 (L)  37.0 - 48.5 % Final    MCV 01/12/2024 91  82 - 98 fL Final    MCH 01/12/2024 29.4  27.0 - 31.0 pg Final    MCHC 01/12/2024 32.1  32.0 - 36.0 g/dL Final    RDW 01/12/2024 15.9 (H)  11.5 - 14.5 % Final    Platelets 01/12/2024 285  150 - 450 K/uL Final    MPV 01/12/2024 9.4  9.2 - 12.9 fL Final    Immature Granulocytes 01/12/2024 0.6 (H)  0.0 - 0.5 % Final    Gran # (ANC) 01/12/2024 6.5  1.8 - 7.7 K/uL Final    Immature Grans (Abs) 01/12/2024 0.05 (H)  0.00 - 0.04 K/uL Final    Lymph # 01/12/2024 0.8 (L)  1.0 - 4.8 K/uL Final    Mono # 01/12/2024 0.5  0.3 - 1.0 K/uL Final    Eos # 01/12/2024 0.2  0.0 - 0.5 K/uL Final    Baso # 01/12/2024 0.05  0.00 - 0.20 K/uL Final    nRBC 01/12/2024 0  0 /100 WBC Final    Gran % 01/12/2024 81.1 (H)  38.0 - 73.0 % Final    Lymph % 01/12/2024 9.7 (L)  18.0 -  48.0 % Final    Mono % 01/12/2024 5.6  4.0 - 15.0 % Final    Eosinophil % 01/12/2024 2.4  0.0 - 8.0 % Final    Basophil % 01/12/2024 0.6  0.0 - 1.9 % Final    Differential Method 01/12/2024 Automated   Final    Sodium 01/12/2024 135 (L)  136 - 145 mmol/L Final    Potassium 01/12/2024 3.5  3.5 - 5.1 mmol/L Final    Chloride 01/12/2024 103  95 - 110 mmol/L Final    CO2 01/12/2024 21 (L)  23 - 29 mmol/L Final    Glucose 01/12/2024 116 (H)  70 - 110 mg/dL Final    BUN 01/12/2024 28 (H)  8 - 23 mg/dL Final    Creatinine 01/12/2024 2.1 (H)  0.5 - 1.4 mg/dL Final    Calcium 01/12/2024 8.4 (L)  8.7 - 10.5 mg/dL Final    Total Protein 01/12/2024 6.0  6.0 - 8.4 g/dL Final    Albumin 01/12/2024 3.1 (L)  3.5 - 5.2 g/dL Final    Total Bilirubin 01/12/2024 0.6  0.1 - 1.0 mg/dL Final    Alkaline Phosphatase 01/12/2024 95  55 - 135 U/L Final    AST 01/12/2024 16  10 - 40 U/L Final    ALT 01/12/2024 15  10 - 44 U/L Final    eGFR 01/12/2024 25 (A)  >60 mL/min/1.73 m^2 Final    Anion Gap 01/12/2024 11  8 - 16 mmol/L Final   Hospital Outpatient Visit on 08/31/2023   Component Date Value Ref Range Status    BSA 08/31/2023 2.14  m2 Final    Sinus 08/31/2023 3.2  cm Final    STJ 08/31/2023 2.4  cm Final    Ascending aorta 08/31/2023 3.7  cm Final   Orders Only on 08/21/2023   Component Date Value Ref Range Status    aPTT 08/28/2023 33 (H)  23 - 32 sec Final    Glucose 08/28/2023 84  65 - 99 mg/dL Final    BUN 08/28/2023 21  7 - 25 mg/dL Final    Creatinine 08/28/2023 2.00 (H)  0.50 - 1.05 mg/dL Final    eGFR 08/28/2023 27 (L)  > OR = 60 mL/min/1.73m2 Final    BUN/Creatinine Ratio 08/28/2023 11  6 - 22 (calc) Final    Sodium 08/28/2023 138  135 - 146 mmol/L Final    Potassium 08/28/2023 4.2  3.5 - 5.3 mmol/L Final    Chloride 08/28/2023 103  98 - 110 mmol/L Final    CO2 08/28/2023 26  20 - 32 mmol/L Final    Calcium 08/28/2023 9.5  8.6 - 10.4 mg/dL Final    WBC 08/28/2023 8.0  3.8 - 10.8 Thousand/uL Final    RBC 08/28/2023 4.19  3.80 -  5.10 Million/uL Final    Hemoglobin 2023 12.7  11.7 - 15.5 g/dL Final    Hematocrit 2023 39.1  35.0 - 45.0 % Final    MCV 2023 93.3  80.0 - 100.0 fL Final    MCH 2023 30.3  27.0 - 33.0 pg Final    MCHC 2023 32.5  32.0 - 36.0 g/dL Final    RDW 2023 14.1  11.0 - 15.0 % Final    Platelets 2023 289  140 - 400 Thousand/uL Final    MPV 2023 9.2  7.5 - 12.5 fL Final    Neutrophils, Abs 2023 6,144  1,500 - 7,800 cells/uL Final    Lymph # 2023 1,040  850 - 3,900 cells/uL Final    Mono # 2023 544  200 - 950 cells/uL Final    Eos # 2023 224  15 - 500 cells/uL Final    Baso # 2023 48  0 - 200 cells/uL Final    Neutrophils Relative 2023 76.8  % Final    Lymph % 2023 13.0  % Final    Mono % 2023 6.8  % Final    Eosinophil % 2023 2.8  % Final    Basophil % 2023 0.6  % Final    INR 2023 1.1   Final    Prothrombin Time 2023 11.7 (H)  9.0 - 11.5 sec Final       Past Medical History:   Diagnosis Date    Arthritis     Asthma     COPD (chronic obstructive pulmonary disease)     Encounter for blood transfusion     Hypertension     Wound infection 2019    Right knee     Past Surgical History:   Procedure Laterality Date    ANGIOGRAM, CORONARY, WITH LEFT HEART CATHETERIZATION N/A 2022    Procedure: Angiogram, Coronary, with Left Heart Cath;  Surgeon: Jorge Tran MD;  Location: [facility] CATH/EP LAB;  Service: Cardiology;  Laterality: N/A;     SECTION      ECHOCARDIOGRAM,TRANSESOPHAGEAL N/A 2023    Procedure: Transesophageal echo (MARIANO) intra-procedure log documentation;  [facility];  Location: [facility] EP LAB;  Service: Cardiology;  Laterality: N/A;    JOINT REPLACEMENT      Knee    KNEE ARTHROPLASTY Right 2019    Procedure: ARTHROPLASTY, KNEE;  Surgeon: Delio Chung MD;  Location: [facility];  Service: Orthopedics;  Laterality: Right;  anesthesia:  general and block     REPLACEMENT OF WOUND VACUUM-ASSISTED CLOSURE DEVICE Right 9/12/2019    Procedure: REPLACEMENT, WOUND VAC;  Surgeon: Delio Chung MD;  Location: [facility] OR;  Service: Orthopedics;  Laterality: Right;    TONSILLECTOMY      TREATMENT OF CARDIAC ARRHYTHMIA N/A 8/31/2023    Procedure: Cardioversion or Defibrillation;  Surgeon: PJ Betancourt MD;  Location: [facility] EP LAB;  Service: Cardiology;  Laterality: N/A;  AF, MARIANO, DCCV, MAC, EH, 3 Prep    VENTRICULOGRAM, LEFT  12/23/2022    Procedure: Ventriculogram, Left;  Surgeon: Jorge Tran MD;  Location: [facility] CATH/EP LAB;  Service: Cardiology;;     Family History   Problem Relation Age of Onset    Alzheimer's disease Mother        Tests to Keep You Healthy    Mammogram: Met on 12/5/2023  Colon Cancer Screening: ORDERED  Last Blood Pressure <= 139/89 (1/29/2024): NO  Tobacco Cessation: NO      The CVD Risk score (D'Agostino, et al., 2008) failed to calculate for the following reasons:    The patient has a prior MI, stroke, CHF, or peripheral vascular disease diagnosis     Marital Status:   Alcohol History:  reports current alcohol use.  Tobacco History:  reports that she has been smoking cigarettes. She started smoking about 49 years ago. She has a 23.5 pack-year smoking history. She has been exposed to tobacco smoke. She has never used smokeless tobacco.  Drug History:  reports no history of drug use.    Health Maintenance Topics with due status: Not Due       Topic Last Completion Date    TETANUS VACCINE 09/18/2019    Hemoglobin A1c (Diabetic Prevention Screening) 12/23/2022    Lipid Panel 05/03/2023    LDCT Lung Screen 12/05/2023    Mammogram 12/05/2023    DEXA Scan 12/05/2023     Immunization History   Administered Date(s) Administered    COVID-19, MRNA, LN-S, PF (MODERNA FULL 0.5 ML DOSE) 02/26/2021, 03/26/2021, 12/14/2021    Influenza (FLUAD) - Quadrivalent - Adjuvanted - PF *Preferred* (65+) 11/09/2023    Influenza - High Dose - PF (65 years and older)  09/18/2019    Influenza - Quadrivalent - High Dose - PF (65 years and older) 08/20/2020, 10/25/2021, 11/08/2022    PPD Test 09/04/2019    Pneumococcal Conjugate - 13 Valent 03/06/2019    Pneumococcal Polysaccharide - 23 Valent 07/21/2020    Tdap 09/18/2019       Review of patient's allergies indicates:  No Known Allergies    Current Outpatient Medications:     albuterol (PROVENTIL/VENTOLIN HFA) 90 mcg/actuation inhaler, Inhale 2 puffs into the lungs every 6 (six) hours as needed for Wheezing. Rescue, Disp: 18 g, Rfl: 2    amiodarone (PACERONE) 200 MG Tab, Take 1 tablet (200 mg total) by mouth once daily., Disp: 30 tablet, Rfl: 11    amLODIPine (NORVASC) 2.5 MG tablet, Take 1 tablet (2.5 mg total) by mouth every evening., Disp: 90 tablet, Rfl: 3    apixaban (ELIQUIS) 5 mg Tab, Take 1 tablet (5 mg total) by mouth 2 (two) times daily., Disp: 180 tablet, Rfl: 3    atorvastatin (LIPITOR) 10 MG tablet, Take 1 tablet (10 mg total) by mouth every evening., Disp: 90 tablet, Rfl: 3    betamethasone dipropionate 0.05 % cream, Apply topically 2 (two) times daily., Disp: 45 g, Rfl: 2    buPROPion (WELLBUTRIN SR) 150 MG TBSR 12 hr tablet, Take 1 tablet (150 mg total) by mouth 2 (two) times daily., Disp: 180 tablet, Rfl: 3    clindamycin (CLEOCIN) 300 MG capsule, Take 1 capsule (300 mg total) by mouth 3 (three) times daily. for 10 days, Disp: 30 capsule, Rfl: 0    ergocalciferol (ERGOCALCIFEROL) 50,000 unit Cap, Take 50,000 Units by mouth every 7 days., Disp: , Rfl:     ferrous sulfate (FEOSOL) 325 mg (65 mg iron) Tab tablet, Take 1 tablet (325 mg total) by mouth once daily., Disp: , Rfl: 0    FLUoxetine 40 MG capsule, Take 1 capsule (40 mg total) by mouth once daily., Disp: 90 capsule, Rfl: 3    furosemide (LASIX) 20 MG tablet, Take 20 mg by mouth daily as needed for Shortness of Breath., Disp: , Rfl:     gabapentin (NEURONTIN) 300 MG capsule, Take 1 capsule (300 mg total) by mouth 3 (three) times daily. (Patient not taking:  Reported on 1/5/2024), Disp: 270 capsule, Rfl: 1    HYDROcodone-acetaminophen (NORCO) 5-325 mg per tablet, Take 1 tablet by mouth every 6 (six) hours as needed for Pain., Disp: 12 tablet, Rfl: 0    loratadine (CLARITIN) 10 mg tablet, Take 10 mg by mouth once daily., Disp: , Rfl:     metoprolol succinate (TOPROL-XL) 25 MG 24 hr tablet, Take 2 tablets (50 mg total) by mouth once daily., Disp: 180 tablet, Rfl: 3    mupirocin (BACTROBAN) 2 % ointment, Apply topically 2 (two) times daily., Disp: 22 g, Rfl: 1    traZODone (DESYREL) 50 MG tablet, Take 2 tablets (100 mg total) by mouth nightly as needed for Insomnia., Disp: 180 tablet, Rfl: 1    triamcinolone acetonide 0.1% (KENALOG) 0.1 % cream, Apply topically 2 (two) times daily., Disp: 45 g, Rfl: 2  No current facility-administered medications for this visit.    Facility-Administered Medications Ordered in Other Visits:     lidocaine (PF) 10 mg/ml (1%) injection 5 mg, 0.5 mL, Intradermal, Once, Azeem Anderson MD    Review of Systems   Constitutional:  Negative for appetite change, chills and fever.   HENT:  Positive for facial swelling (forehead hematoma resolving).    Eyes:  Negative for visual disturbance.   Respiratory:  Positive for shortness of breath (only with heavy exertion). Negative for cough and wheezing.    Cardiovascular:  Negative for chest pain, palpitations and leg swelling.   Genitourinary:  Positive for frequency. Negative for dysuria and hematuria.   Musculoskeletal:  Positive for joint swelling (left knee swelling).   Skin:  Positive for color change (left knee bruising, erythema improved). Negative for wound.   Neurological:  Negative for dizziness, syncope, speech difficulty and headaches.   Hematological:  Bruises/bleeds easily.          Objective:      Vitals:    01/29/24 1013 01/29/24 1101   BP: (!) 149/81 (!) 140/80   Pulse: 109 90   SpO2: 97%    Weight: 112 kg (247 lb)    Height: 5' (1.524 m)      Physical Exam  Vitals and nursing note  reviewed.   Constitutional:       General: She is not in acute distress.     Appearance: She is well-developed.      Comments: Morbidly obese white female, appears older than stated age   HENT:      Head: Normocephalic.        Comments: Fading ecchymosis to face/neck  Neck:      Vascular: No carotid bruit.   Cardiovascular:      Rate and Rhythm: Normal rate. Rhythm irregular.      Heart sounds: Murmur (2nd ICS RSB) heard.      Systolic murmur is present with a grade of 3/6.      No friction rub. No gallop.   Pulmonary:      Effort: Pulmonary effort is normal. No respiratory distress.      Breath sounds: Normal breath sounds. No wheezing or rales.   Abdominal:      General: There is no distension.      Palpations: Abdomen is soft.      Tenderness: There is no abdominal tenderness.   Musculoskeletal:      Cervical back: Neck supple.      Left knee: Swelling (soft hematoma, +tender to palpation, black eschar at previous blister sites) and ecchymosis present. No erythema. Decreased range of motion (flexion limited to 80-90 degrees). Tenderness present.      Right lower leg: No edema.      Left lower leg: No edema.   Lymphadenopathy:      Cervical: No cervical adenopathy.   Skin:     General: Skin is warm and dry.      Findings: No rash.   Neurological:      General: No focal deficit present.      Mental Status: She is alert and oriented to person, place, and time.      Gait: Gait abnormal (antalgic gait).           Assessment:       1. Contusion of left knee, subsequent encounter    2. Infected hematoma    3. Hypertensive kidney disease with stage 4 chronic kidney disease           Plan:       1. Contusion of left knee, subsequent encounter  -knee appears less erythematous though still with significant hematoma nad pain which limits ROM. Will refer to ortho to see if they feel aspiration would be beneficial.   -     Ambulatory referral/consult to Orthopedics; Future; Expected date: 02/05/2024    2. Infected  hematoma  -finish out 10 days of clindamycin and continue with mupirocin  -     Ambulatory referral/consult to Orthopedics; Future; Expected date: 02/05/2024    3. Hypertensive kidney disease with stage 4 chronic kidney disease  -BP remains elevated, will increase metoprolol dose. Lisinopril was discontinued by renal and switched to low dose amlodipine though will hold on increasing amlodipine d/t potential leg swelling  -     metoprolol succinate (TOPROL-XL) 25 MG 24 hr tablet; Take 2 tablets (50 mg total) by mouth once daily.  Dispense: 180 tablet; Refill: 3      Follow up for as scheduled.            1/29/2024 Roselyn Cote NP

## 2024-02-03 ENCOUNTER — HOSPITAL ENCOUNTER (INPATIENT)
Facility: HOSPITAL | Age: 69
LOS: 9 days | Discharge: HOME HEALTH | DRG: 571 | End: 2024-02-12
Attending: EMERGENCY MEDICINE | Admitting: INTERNAL MEDICINE
Payer: MEDICARE

## 2024-02-03 DIAGNOSIS — D64.9 SYMPTOMATIC ANEMIA: ICD-10-CM

## 2024-02-03 DIAGNOSIS — D62 ACUTE BLOOD LOSS ANEMIA: ICD-10-CM

## 2024-02-03 DIAGNOSIS — R07.9 CHEST PAIN: ICD-10-CM

## 2024-02-03 DIAGNOSIS — S80.02XA TRAUMATIC HEMATOMA OF KNEE, LEFT, INITIAL ENCOUNTER: Primary | ICD-10-CM

## 2024-02-03 LAB
ABO + RH BLD: NORMAL
ALBUMIN SERPL BCP-MCNC: 3.4 G/DL (ref 3.5–5.2)
ALP SERPL-CCNC: 106 U/L (ref 55–135)
ALT SERPL W/O P-5'-P-CCNC: 8 U/L (ref 10–44)
ANION GAP SERPL CALC-SCNC: 6 MMOL/L (ref 8–16)
AST SERPL-CCNC: 10 U/L (ref 10–40)
BASOPHILS # BLD AUTO: 0.02 K/UL (ref 0–0.2)
BASOPHILS NFR BLD: 0.3 % (ref 0–1.9)
BILIRUB SERPL-MCNC: 0.4 MG/DL (ref 0.1–1)
BLD GP AB SCN CELLS X3 SERPL QL: NORMAL
BLD PROD TYP BPU: NORMAL
BLOOD UNIT EXPIRATION DATE: NORMAL
BLOOD UNIT TYPE CODE: 1700
BLOOD UNIT TYPE: NORMAL
BNP SERPL-MCNC: 477 PG/ML (ref 0–99)
BUN SERPL-MCNC: 22 MG/DL (ref 8–23)
CALCIUM SERPL-MCNC: 8.3 MG/DL (ref 8.7–10.5)
CHLORIDE SERPL-SCNC: 104 MMOL/L (ref 95–110)
CK SERPL-CCNC: 25 U/L (ref 20–180)
CO2 SERPL-SCNC: 25 MMOL/L (ref 23–29)
CODING SYSTEM: NORMAL
CREAT SERPL-MCNC: 2 MG/DL (ref 0.5–1.4)
CROSSMATCH INTERPRETATION: NORMAL
DIFFERENTIAL METHOD BLD: ABNORMAL
DISPENSE STATUS: NORMAL
EOSINOPHIL # BLD AUTO: 0.2 K/UL (ref 0–0.5)
EOSINOPHIL NFR BLD: 2.5 % (ref 0–8)
ERYTHROCYTE [DISTWIDTH] IN BLOOD BY AUTOMATED COUNT: 17.1 % (ref 11.5–14.5)
EST. GFR  (NO RACE VARIABLE): 26.4 ML/MIN/1.73 M^2
GLUCOSE SERPL-MCNC: 85 MG/DL (ref 70–110)
HCT VFR BLD AUTO: 23.7 % (ref 37–48.5)
HGB BLD-MCNC: 6.8 G/DL (ref 12–16)
IMM GRANULOCYTES # BLD AUTO: 0.06 K/UL (ref 0–0.04)
IMM GRANULOCYTES NFR BLD AUTO: 1 % (ref 0–0.5)
LIPASE SERPL-CCNC: 11 U/L (ref 4–60)
LYMPHOCYTES # BLD AUTO: 0.6 K/UL (ref 1–4.8)
LYMPHOCYTES NFR BLD: 9.1 % (ref 18–48)
MAGNESIUM SERPL-MCNC: 2 MG/DL (ref 1.6–2.6)
MAGNESIUM SERPL-MCNC: 2 MG/DL (ref 1.6–2.6)
MCH RBC QN AUTO: 27.2 PG (ref 27–31)
MCHC RBC AUTO-ENTMCNC: 28.7 G/DL (ref 32–36)
MCV RBC AUTO: 95 FL (ref 82–98)
MONOCYTES # BLD AUTO: 0.5 K/UL (ref 0.3–1)
MONOCYTES NFR BLD: 8.6 % (ref 4–15)
NEUTROPHILS # BLD AUTO: 4.9 K/UL (ref 1.8–7.7)
NEUTROPHILS NFR BLD: 78.5 % (ref 38–73)
NRBC BLD-RTO: 0 /100 WBC
NUM UNITS TRANS PACKED RBC: NORMAL
PLATELET # BLD AUTO: 387 K/UL (ref 150–450)
PMV BLD AUTO: 9.7 FL (ref 9.2–12.9)
POTASSIUM SERPL-SCNC: 4.2 MMOL/L (ref 3.5–5.1)
PROT SERPL-MCNC: 5.6 G/DL (ref 6–8.4)
RBC # BLD AUTO: 2.5 M/UL (ref 4–5.4)
SODIUM SERPL-SCNC: 135 MMOL/L (ref 136–145)
SPECIMEN OUTDATE: NORMAL
TROPONIN I SERPL HS-MCNC: 6.5 PG/ML (ref 0–14.9)
TSH SERPL DL<=0.005 MIU/L-ACNC: 0.88 UIU/ML (ref 0.34–5.6)
WBC # BLD AUTO: 6.29 K/UL (ref 3.9–12.7)

## 2024-02-03 PROCEDURE — 86920 COMPATIBILITY TEST SPIN: CPT | Performed by: EMERGENCY MEDICINE

## 2024-02-03 PROCEDURE — 82550 ASSAY OF CK (CPK): CPT | Performed by: EMERGENCY MEDICINE

## 2024-02-03 PROCEDURE — 99285 EMERGENCY DEPT VISIT HI MDM: CPT | Mod: 25

## 2024-02-03 PROCEDURE — 94761 N-INVAS EAR/PLS OXIMETRY MLT: CPT

## 2024-02-03 PROCEDURE — 99900031 HC PATIENT EDUCATION (STAT)

## 2024-02-03 PROCEDURE — P9016 RBC LEUKOCYTES REDUCED: HCPCS | Performed by: EMERGENCY MEDICINE

## 2024-02-03 PROCEDURE — 21400001 HC TELEMETRY ROOM

## 2024-02-03 PROCEDURE — 85025 COMPLETE CBC W/AUTO DIFF WBC: CPT | Performed by: EMERGENCY MEDICINE

## 2024-02-03 PROCEDURE — 83735 ASSAY OF MAGNESIUM: CPT | Performed by: EMERGENCY MEDICINE

## 2024-02-03 PROCEDURE — 84443 ASSAY THYROID STIM HORMONE: CPT | Performed by: EMERGENCY MEDICINE

## 2024-02-03 PROCEDURE — 94640 AIRWAY INHALATION TREATMENT: CPT

## 2024-02-03 PROCEDURE — 25000242 PHARM REV CODE 250 ALT 637 W/ HCPCS: Performed by: INTERNAL MEDICINE

## 2024-02-03 PROCEDURE — 36430 TRANSFUSION BLD/BLD COMPNT: CPT

## 2024-02-03 PROCEDURE — 80053 COMPREHEN METABOLIC PANEL: CPT | Performed by: EMERGENCY MEDICINE

## 2024-02-03 PROCEDURE — 83880 ASSAY OF NATRIURETIC PEPTIDE: CPT | Performed by: EMERGENCY MEDICINE

## 2024-02-03 PROCEDURE — 25000003 PHARM REV CODE 250: Performed by: INTERNAL MEDICINE

## 2024-02-03 PROCEDURE — 83690 ASSAY OF LIPASE: CPT | Performed by: EMERGENCY MEDICINE

## 2024-02-03 PROCEDURE — 93005 ELECTROCARDIOGRAM TRACING: CPT | Performed by: GENERAL PRACTICE

## 2024-02-03 PROCEDURE — 86901 BLOOD TYPING SEROLOGIC RH(D): CPT | Performed by: EMERGENCY MEDICINE

## 2024-02-03 PROCEDURE — 84484 ASSAY OF TROPONIN QUANT: CPT | Performed by: EMERGENCY MEDICINE

## 2024-02-03 PROCEDURE — 93010 ELECTROCARDIOGRAM REPORT: CPT | Mod: ,,, | Performed by: GENERAL PRACTICE

## 2024-02-03 RX ORDER — METOPROLOL SUCCINATE 25 MG/1
25 TABLET, EXTENDED RELEASE ORAL 2 TIMES DAILY
Status: DISCONTINUED | OUTPATIENT
Start: 2024-02-03 | End: 2024-02-12 | Stop reason: HOSPADM

## 2024-02-03 RX ORDER — HYDROCODONE BITARTRATE AND ACETAMINOPHEN 5; 325 MG/1; MG/1
1 TABLET ORAL EVERY 6 HOURS PRN
Status: DISCONTINUED | OUTPATIENT
Start: 2024-02-03 | End: 2024-02-04

## 2024-02-03 RX ORDER — ALBUTEROL SULFATE 0.83 MG/ML
2.5 SOLUTION RESPIRATORY (INHALATION) EVERY 6 HOURS PRN
Status: DISCONTINUED | OUTPATIENT
Start: 2024-02-03 | End: 2024-02-12 | Stop reason: HOSPADM

## 2024-02-03 RX ORDER — AMIODARONE HYDROCHLORIDE 200 MG/1
200 TABLET ORAL DAILY
Status: DISCONTINUED | OUTPATIENT
Start: 2024-02-04 | End: 2024-02-12 | Stop reason: HOSPADM

## 2024-02-03 RX ORDER — VANCOMYCIN HCL IN 5 % DEXTROSE 1G/250ML
2000 PLASTIC BAG, INJECTION (ML) INTRAVENOUS ONCE
Status: COMPLETED | OUTPATIENT
Start: 2024-02-03 | End: 2024-02-04

## 2024-02-03 RX ORDER — TALC
9 POWDER (GRAM) TOPICAL NIGHTLY PRN
Status: DISCONTINUED | OUTPATIENT
Start: 2024-02-03 | End: 2024-02-12 | Stop reason: HOSPADM

## 2024-02-03 RX ORDER — TRAZODONE HYDROCHLORIDE 50 MG/1
100 TABLET ORAL NIGHTLY PRN
Status: DISCONTINUED | OUTPATIENT
Start: 2024-02-03 | End: 2024-02-12 | Stop reason: HOSPADM

## 2024-02-03 RX ORDER — HYDROCODONE BITARTRATE AND ACETAMINOPHEN 500; 5 MG/1; MG/1
TABLET ORAL
Status: DISCONTINUED | OUTPATIENT
Start: 2024-02-03 | End: 2024-02-12 | Stop reason: HOSPADM

## 2024-02-03 RX ORDER — AMLODIPINE BESYLATE 2.5 MG/1
2.5 TABLET ORAL NIGHTLY
Status: DISCONTINUED | OUTPATIENT
Start: 2024-02-03 | End: 2024-02-09

## 2024-02-03 RX ORDER — CETIRIZINE HYDROCHLORIDE 10 MG/1
10 TABLET ORAL DAILY
Status: DISCONTINUED | OUTPATIENT
Start: 2024-02-04 | End: 2024-02-12 | Stop reason: HOSPADM

## 2024-02-03 RX ORDER — ATORVASTATIN CALCIUM 10 MG/1
10 TABLET, FILM COATED ORAL NIGHTLY
Status: DISCONTINUED | OUTPATIENT
Start: 2024-02-03 | End: 2024-02-12 | Stop reason: HOSPADM

## 2024-02-03 RX ORDER — GABAPENTIN 300 MG/1
300 CAPSULE ORAL 3 TIMES DAILY
Status: DISCONTINUED | OUTPATIENT
Start: 2024-02-04 | End: 2024-02-09

## 2024-02-03 RX ORDER — MUPIROCIN 20 MG/G
OINTMENT TOPICAL 2 TIMES DAILY
Status: COMPLETED | OUTPATIENT
Start: 2024-02-03 | End: 2024-02-08

## 2024-02-03 RX ORDER — ONDANSETRON HYDROCHLORIDE 2 MG/ML
4 INJECTION, SOLUTION INTRAVENOUS EVERY 6 HOURS PRN
Status: DISCONTINUED | OUTPATIENT
Start: 2024-02-03 | End: 2024-02-12 | Stop reason: HOSPADM

## 2024-02-03 RX ORDER — NALOXONE HCL 0.4 MG/ML
0.02 VIAL (ML) INJECTION
Status: DISCONTINUED | OUTPATIENT
Start: 2024-02-03 | End: 2024-02-12 | Stop reason: HOSPADM

## 2024-02-03 RX ORDER — SODIUM CHLORIDE 0.9 % (FLUSH) 0.9 %
10 SYRINGE (ML) INJECTION EVERY 12 HOURS PRN
Status: DISCONTINUED | OUTPATIENT
Start: 2024-02-03 | End: 2024-02-12 | Stop reason: HOSPADM

## 2024-02-03 RX ORDER — FLUOXETINE HYDROCHLORIDE 20 MG/1
40 CAPSULE ORAL DAILY
Status: DISCONTINUED | OUTPATIENT
Start: 2024-02-04 | End: 2024-02-12 | Stop reason: HOSPADM

## 2024-02-03 RX ORDER — ALBUTEROL SULFATE 90 UG/1
2 AEROSOL, METERED RESPIRATORY (INHALATION) EVERY 6 HOURS PRN
Status: DISCONTINUED | OUTPATIENT
Start: 2024-02-03 | End: 2024-02-03

## 2024-02-03 RX ORDER — IBUPROFEN 200 MG
24 TABLET ORAL
Status: DISCONTINUED | OUTPATIENT
Start: 2024-02-03 | End: 2024-02-12 | Stop reason: HOSPADM

## 2024-02-03 RX ORDER — GLUCAGON 1 MG
1 KIT INJECTION
Status: DISCONTINUED | OUTPATIENT
Start: 2024-02-03 | End: 2024-02-12 | Stop reason: HOSPADM

## 2024-02-03 RX ORDER — BUPROPION HYDROCHLORIDE 150 MG/1
150 TABLET, EXTENDED RELEASE ORAL 2 TIMES DAILY
Status: DISCONTINUED | OUTPATIENT
Start: 2024-02-03 | End: 2024-02-12 | Stop reason: HOSPADM

## 2024-02-03 RX ORDER — IBUPROFEN 200 MG
16 TABLET ORAL
Status: DISCONTINUED | OUTPATIENT
Start: 2024-02-03 | End: 2024-02-12 | Stop reason: HOSPADM

## 2024-02-03 RX ADMIN — AMLODIPINE BESYLATE 2.5 MG: 2.5 TABLET ORAL at 11:02

## 2024-02-03 RX ADMIN — ALBUTEROL SULFATE 2.5 MG: 2.5 SOLUTION RESPIRATORY (INHALATION) at 10:02

## 2024-02-03 RX ADMIN — MUPIROCIN 1 G: 20 OINTMENT TOPICAL at 11:02

## 2024-02-03 RX ADMIN — HYDROCODONE BITARTRATE AND ACETAMINOPHEN 1 TABLET: 5; 325 TABLET ORAL at 09:02

## 2024-02-03 RX ADMIN — ATORVASTATIN CALCIUM 10 MG: 10 TABLET, FILM COATED ORAL at 11:02

## 2024-02-03 RX ADMIN — METOPROLOL SUCCINATE 25 MG: 25 TABLET, EXTENDED RELEASE ORAL at 11:02

## 2024-02-03 RX ADMIN — BUPROPION HYDROCHLORIDE 150 MG: 150 TABLET, EXTENDED RELEASE ORAL at 11:02

## 2024-02-03 RX ADMIN — TRAZODONE HYDROCHLORIDE 100 MG: 50 TABLET ORAL at 11:02

## 2024-02-03 NOTE — PROGRESS NOTES
Contacted After hours by [facility]: Angeli reporting that pt had lab drawn 2/2/2024., reporting hemoglobin at 6.9. Called pt and she reports being more short of breath, lightheaded, and pale since she fell and lost a lot of blood a few days ago in an accident at home. Patient was advised to go to the emergency room and explain her situation as she may need a blood transfusion in order to help decrease her symptoms.

## 2024-02-04 PROBLEM — S80.02XA TRAUMATIC HEMATOMA OF KNEE, LEFT, INITIAL ENCOUNTER: Status: ACTIVE | Noted: 2024-02-04

## 2024-02-04 LAB
ALBUMIN SERPL BCP-MCNC: 3.6 G/DL (ref 3.5–5.2)
ALBUMIN SERPL-MCNC: 3.4 G/DL (ref 3.6–5.1)
ALBUMIN/CREAT UR: 29 MCG/MG CREAT
ALBUMIN/GLOB SERPL: 1.4 (CALC) (ref 1–2.5)
ALP SERPL-CCNC: 108 U/L (ref 55–135)
ALP SERPL-CCNC: 93 U/L (ref 37–153)
ALT SERPL W/O P-5'-P-CCNC: 9 U/L (ref 10–44)
ALT SERPL-CCNC: 8 U/L (ref 6–29)
ANION GAP SERPL CALC-SCNC: 7 MMOL/L (ref 8–16)
APPEARANCE UR: CLEAR
AST SERPL-CCNC: 10 U/L (ref 10–40)
AST SERPL-CCNC: 11 U/L (ref 10–35)
BACTERIA #/AREA URNS HPF: NORMAL /HPF
BACTERIA UR CULT: NORMAL
BASOPHILS # BLD AUTO: 0.02 K/UL (ref 0–0.2)
BASOPHILS # BLD AUTO: 32 CELLS/UL (ref 0–200)
BASOPHILS NFR BLD AUTO: 0.5 %
BASOPHILS NFR BLD: 0.3 % (ref 0–1.9)
BILIRUB SERPL-MCNC: 0.5 MG/DL (ref 0.1–1)
BILIRUB SERPL-MCNC: 0.5 MG/DL (ref 0.2–1.2)
BILIRUB UR QL STRIP: NEGATIVE
BUN SERPL-MCNC: 20 MG/DL (ref 7–25)
BUN SERPL-MCNC: 21 MG/DL (ref 8–23)
BUN/CREAT SERPL: 11 (CALC) (ref 6–22)
CALCIUM SERPL-MCNC: 8.4 MG/DL (ref 8.6–10.4)
CALCIUM SERPL-MCNC: 8.4 MG/DL (ref 8.7–10.5)
CHLORIDE SERPL-SCNC: 103 MMOL/L (ref 95–110)
CHLORIDE SERPL-SCNC: 99 MMOL/L (ref 98–110)
CHOLEST SERPL-MCNC: 126 MG/DL
CHOLEST/HDLC SERPL: 2.9 (CALC)
CO2 SERPL-SCNC: 24 MMOL/L (ref 20–32)
CO2 SERPL-SCNC: 25 MMOL/L (ref 23–29)
COLOR UR: YELLOW
CREAT SERPL-MCNC: 1.89 MG/DL (ref 0.6–1)
CREAT SERPL-MCNC: 2 MG/DL (ref 0.5–1.4)
CREAT UR-MCNC: 65 MG/DL (ref 20–275)
DIFFERENTIAL METHOD BLD: ABNORMAL
EGFR: 28 ML/MIN/1.73M2
EOSINOPHIL # BLD AUTO: 0.1 K/UL (ref 0–0.5)
EOSINOPHIL # BLD AUTO: 183 CELLS/UL (ref 15–500)
EOSINOPHIL NFR BLD AUTO: 2.9 %
EOSINOPHIL NFR BLD: 2 % (ref 0–8)
ERYTHROCYTE [DISTWIDTH] IN BLOOD BY AUTOMATED COUNT: 15.6 % (ref 11–15)
ERYTHROCYTE [DISTWIDTH] IN BLOOD BY AUTOMATED COUNT: 17.2 % (ref 11.5–14.5)
EST. GFR  (NO RACE VARIABLE): 26.4 ML/MIN/1.73 M^2
GLOBULIN SER CALC-MCNC: 2.4 G/DL (CALC) (ref 1.9–3.7)
GLUCOSE SERPL-MCNC: 103 MG/DL (ref 70–110)
GLUCOSE SERPL-MCNC: 87 MG/DL (ref 65–99)
GLUCOSE UR QL STRIP: NEGATIVE
HCT VFR BLD AUTO: 23.1 % (ref 35–45)
HCT VFR BLD AUTO: 27.3 % (ref 37–48.5)
HDLC SERPL-MCNC: 44 MG/DL
HGB BLD-MCNC: 6.9 G/DL (ref 11.7–15.5)
HGB BLD-MCNC: 8.1 G/DL (ref 12–16)
HGB UR QL STRIP: NEGATIVE
HYALINE CASTS #/AREA URNS LPF: NORMAL /LPF
IMM GRANULOCYTES # BLD AUTO: 0.04 K/UL (ref 0–0.04)
IMM GRANULOCYTES NFR BLD AUTO: 0.6 % (ref 0–0.5)
KETONES UR QL STRIP: NEGATIVE
LACTATE SERPL-SCNC: 0.6 MMOL/L (ref 0.5–1.9)
LDLC SERPL CALC-MCNC: 67 MG/DL (CALC)
LEUKOCYTE ESTERASE UR QL STRIP: NEGATIVE
LYMPHOCYTES # BLD AUTO: 0.5 K/UL (ref 1–4.8)
LYMPHOCYTES # BLD AUTO: 712 CELLS/UL (ref 850–3900)
LYMPHOCYTES NFR BLD AUTO: 11.3 %
LYMPHOCYTES NFR BLD: 8.2 % (ref 18–48)
MCH RBC QN AUTO: 26.8 PG (ref 27–33)
MCH RBC QN AUTO: 27.6 PG (ref 27–31)
MCHC RBC AUTO-ENTMCNC: 29.7 G/DL (ref 32–36)
MCHC RBC AUTO-ENTMCNC: 29.9 G/DL (ref 32–36)
MCV RBC AUTO: 89.9 FL (ref 80–100)
MCV RBC AUTO: 93 FL (ref 82–98)
MICROALBUMIN UR-MCNC: 1.9 MG/DL
MONOCYTES # BLD AUTO: 0.3 K/UL (ref 0.3–1)
MONOCYTES # BLD AUTO: 403 CELLS/UL (ref 200–950)
MONOCYTES NFR BLD AUTO: 6.4 %
MONOCYTES NFR BLD: 4.4 % (ref 4–15)
NEUTROPHILS # BLD AUTO: 4971 CELLS/UL (ref 1500–7800)
NEUTROPHILS # BLD AUTO: 5.6 K/UL (ref 1.8–7.7)
NEUTROPHILS NFR BLD AUTO: 78.9 %
NEUTROPHILS NFR BLD: 84.5 % (ref 38–73)
NITRITE UR QL STRIP: NEGATIVE
NONHDLC SERPL-MCNC: 82 MG/DL (CALC)
NRBC BLD-RTO: 0 /100 WBC
PH UR STRIP: 6 [PH] (ref 5–8)
PLATELET # BLD AUTO: 394 THOUSAND/UL (ref 140–400)
PLATELET # BLD AUTO: 408 K/UL (ref 150–450)
PMV BLD AUTO: 9.4 FL (ref 9.2–12.9)
PMV BLD REES-ECKER: 9.6 FL (ref 7.5–12.5)
POTASSIUM SERPL-SCNC: 4.1 MMOL/L (ref 3.5–5.1)
POTASSIUM SERPL-SCNC: 4.6 MMOL/L (ref 3.5–5.3)
PROT SERPL-MCNC: 5.8 G/DL (ref 6.1–8.1)
PROT SERPL-MCNC: 6.3 G/DL (ref 6–8.4)
PROT UR QL STRIP: NEGATIVE
RBC # BLD AUTO: 2.57 MILLION/UL (ref 3.8–5.1)
RBC # BLD AUTO: 2.93 M/UL (ref 4–5.4)
RBC #/AREA URNS HPF: NORMAL /HPF
SERVICE CMNT-IMP: ABNORMAL
SERVICE CMNT-IMP: NORMAL
SODIUM SERPL-SCNC: 132 MMOL/L (ref 135–146)
SODIUM SERPL-SCNC: 135 MMOL/L (ref 136–145)
SP GR UR STRIP: 1.01 (ref 1–1.03)
SQUAMOUS #/AREA URNS HPF: NORMAL /HPF
TRIGL SERPL-MCNC: 74 MG/DL
TSH SERPL-ACNC: 0.71 MIU/L (ref 0.4–4.5)
VIT B12 SERPL-MCNC: 360 PG/ML (ref 210–950)
WBC # BLD AUTO: 6.3 THOUSAND/UL (ref 3.8–10.8)
WBC # BLD AUTO: 6.6 K/UL (ref 3.9–12.7)
WBC #/AREA URNS HPF: NORMAL /HPF

## 2024-02-04 PROCEDURE — 97535 SELF CARE MNGMENT TRAINING: CPT

## 2024-02-04 PROCEDURE — 36415 COLL VENOUS BLD VENIPUNCTURE: CPT | Performed by: INTERNAL MEDICINE

## 2024-02-04 PROCEDURE — 25000003 PHARM REV CODE 250: Performed by: INTERNAL MEDICINE

## 2024-02-04 PROCEDURE — 87040 BLOOD CULTURE FOR BACTERIA: CPT | Performed by: EMERGENCY MEDICINE

## 2024-02-04 PROCEDURE — 99223 1ST HOSP IP/OBS HIGH 75: CPT | Mod: 57,ICN,, | Performed by: ORTHOPAEDIC SURGERY

## 2024-02-04 PROCEDURE — 63600175 PHARM REV CODE 636 W HCPCS: Performed by: INTERNAL MEDICINE

## 2024-02-04 PROCEDURE — 99900031 HC PATIENT EDUCATION (STAT)

## 2024-02-04 PROCEDURE — 97161 PT EVAL LOW COMPLEX 20 MIN: CPT

## 2024-02-04 PROCEDURE — 85025 COMPLETE CBC W/AUTO DIFF WBC: CPT | Performed by: INTERNAL MEDICINE

## 2024-02-04 PROCEDURE — 97165 OT EVAL LOW COMPLEX 30 MIN: CPT

## 2024-02-04 PROCEDURE — 82607 VITAMIN B-12: CPT | Performed by: INTERNAL MEDICINE

## 2024-02-04 PROCEDURE — 80053 COMPREHEN METABOLIC PANEL: CPT | Performed by: INTERNAL MEDICINE

## 2024-02-04 PROCEDURE — 97116 GAIT TRAINING THERAPY: CPT

## 2024-02-04 PROCEDURE — 21400001 HC TELEMETRY ROOM

## 2024-02-04 PROCEDURE — 99900035 HC TECH TIME PER 15 MIN (STAT)

## 2024-02-04 PROCEDURE — 94761 N-INVAS EAR/PLS OXIMETRY MLT: CPT

## 2024-02-04 PROCEDURE — 83605 ASSAY OF LACTIC ACID: CPT | Performed by: EMERGENCY MEDICINE

## 2024-02-04 RX ORDER — HYDROMORPHONE HYDROCHLORIDE 1 MG/ML
0.5 INJECTION, SOLUTION INTRAMUSCULAR; INTRAVENOUS; SUBCUTANEOUS EVERY 4 HOURS PRN
Status: DISCONTINUED | OUTPATIENT
Start: 2024-02-04 | End: 2024-02-04

## 2024-02-04 RX ORDER — HYDROMORPHONE HYDROCHLORIDE 1 MG/ML
0.2 INJECTION, SOLUTION INTRAMUSCULAR; INTRAVENOUS; SUBCUTANEOUS EVERY 4 HOURS PRN
Status: DISCONTINUED | OUTPATIENT
Start: 2024-02-04 | End: 2024-02-04

## 2024-02-04 RX ORDER — HYDROCODONE BITARTRATE AND ACETAMINOPHEN 7.5; 325 MG/1; MG/1
1 TABLET ORAL EVERY 6 HOURS PRN
Status: DISCONTINUED | OUTPATIENT
Start: 2024-02-04 | End: 2024-02-06

## 2024-02-04 RX ADMIN — MUPIROCIN 1 G: 20 OINTMENT TOPICAL at 08:02

## 2024-02-04 RX ADMIN — HYDROCODONE BITARTRATE AND ACETAMINOPHEN 1 TABLET: 7.5; 325 TABLET ORAL at 10:02

## 2024-02-04 RX ADMIN — CETIRIZINE HYDROCHLORIDE 10 MG: 10 TABLET, FILM COATED ORAL at 08:02

## 2024-02-04 RX ADMIN — CEFTRIAXONE SODIUM 1 G: 1 INJECTION, POWDER, FOR SOLUTION INTRAMUSCULAR; INTRAVENOUS at 08:02

## 2024-02-04 RX ADMIN — FLUOXETINE HYDROCHLORIDE 40 MG: 20 CAPSULE ORAL at 08:02

## 2024-02-04 RX ADMIN — AMIODARONE HYDROCHLORIDE 200 MG: 200 TABLET ORAL at 08:02

## 2024-02-04 RX ADMIN — METOPROLOL SUCCINATE 25 MG: 25 TABLET, EXTENDED RELEASE ORAL at 09:02

## 2024-02-04 RX ADMIN — GABAPENTIN 300 MG: 300 CAPSULE ORAL at 08:02

## 2024-02-04 RX ADMIN — HYDROCODONE BITARTRATE AND ACETAMINOPHEN 1 TABLET: 7.5; 325 TABLET ORAL at 04:02

## 2024-02-04 RX ADMIN — AMLODIPINE BESYLATE 2.5 MG: 2.5 TABLET ORAL at 09:02

## 2024-02-04 RX ADMIN — ATORVASTATIN CALCIUM 10 MG: 10 TABLET, FILM COATED ORAL at 09:02

## 2024-02-04 RX ADMIN — BUPROPION HYDROCHLORIDE 150 MG: 150 TABLET, EXTENDED RELEASE ORAL at 09:02

## 2024-02-04 RX ADMIN — HYDROMORPHONE HYDROCHLORIDE 0.5 MG: 0.5 INJECTION, SOLUTION INTRAMUSCULAR; INTRAVENOUS; SUBCUTANEOUS at 01:02

## 2024-02-04 RX ADMIN — METOPROLOL SUCCINATE 25 MG: 25 TABLET, EXTENDED RELEASE ORAL at 08:02

## 2024-02-04 RX ADMIN — BUPROPION HYDROCHLORIDE 150 MG: 150 TABLET, EXTENDED RELEASE ORAL at 08:02

## 2024-02-04 RX ADMIN — GABAPENTIN 300 MG: 300 CAPSULE ORAL at 09:02

## 2024-02-04 RX ADMIN — MUPIROCIN 1 G: 20 OINTMENT TOPICAL at 09:02

## 2024-02-04 RX ADMIN — VANCOMYCIN HYDROCHLORIDE 2000 MG: 1 INJECTION, POWDER, LYOPHILIZED, FOR SOLUTION INTRAVENOUS at 01:02

## 2024-02-04 RX ADMIN — TRAZODONE HYDROCHLORIDE 100 MG: 50 TABLET ORAL at 09:02

## 2024-02-04 NOTE — ED PROVIDER NOTES
Encounter Date: 2/3/2024       History     Chief Complaint   Patient presents with    ABNORMAL LABS     LOW H/H. PCP CALLED AND TOLD PT TO COME TO ER, BLOOD DRAWN YESTERDAY    Shortness of Breath     MORE THAN NORMAL     68-year-old female referred to the emergency room secondary to abnormal lab findings.  The patient recently saw her primary care provider for a regular checkup.  She had blood work drawn at that time.  She was called today and instructed to come to the emergency room secondary to anemia.  Patient denies chest pain.  She has been having dyspnea with exertion and generalized fatigue as well as generalized weakness over the past several weeks.  Denies any visualized gastrointestinal bleeding.  The patient had a mechanical fall several weeks ago and struck her head.  She was evaluated in the emergency room at that time and had head CT as well as wound care.  She also sustained an injury to her left anterior knee.  She has been seeing her primary care provider weekly for wound care to the knee.  She reports significant improvement in the appearance of the knee as well as discomfort associated to the knee since the fall.  She has had a previous transfusion but this was many years ago and was associated with surgery.  She has not had recent colonoscopy or GI evaluation.  She denies fever chills abdominal pain vomiting or diarrhea or any visualized GI bleeding.  Denies any other problems or complaints.        Review of patient's allergies indicates:  No Known Allergies  Past Medical History:   Diagnosis Date    Anemia, unspecified     Arthritis     Asthma     COPD (chronic obstructive pulmonary disease)     Encounter for blood transfusion     Hypertension     Obese body habitus     Wound infection 08/2019    Right knee     Past Surgical History:   Procedure Laterality Date    ANGIOGRAM, CORONARY, WITH LEFT HEART CATHETERIZATION N/A 12/23/2022    Procedure: Angiogram, Coronary, with Left Heart Cath;   Surgeon: Jorge Tran MD;  Location: [facility] CATH/EP LAB;  Service: Cardiology;  Laterality: N/A;     SECTION      ECHOCARDIOGRAM,TRANSESOPHAGEAL N/A 2023    Procedure: Transesophageal echo (MARIANO) intra-procedure log documentation;  Surgeon: Vikram, Araceli Diagnostic;  Location: [facility] EP LAB;  Service: Cardiology;  Laterality: N/A;    INCISION AND DRAINAGE OF HEMATOMA Left 2024    Procedure: INCISION AND DRAINAGE, HEMATOMA;  Surgeon: Bryson Huerta MD;  Location: [facility] OR;  Service: Orthopedics;  Laterality: Left;    JOINT REPLACEMENT      Knee    KNEE ARTHROPLASTY Right 2019    Procedure: ARTHROPLASTY, KNEE;  Surgeon: Delio Chung MD;  Location: [facility] OR;  Service: Orthopedics;  Laterality: Right;  anesthesia:  general and block    REPLACEMENT OF WOUND VACUUM-ASSISTED CLOSURE DEVICE Right 2019    Procedure: REPLACEMENT, WOUND VAC;  Surgeon: Delio Chung MD;  Location: [facility] OR;  Service: Orthopedics;  Laterality: Right;    TONSILLECTOMY      TREATMENT OF CARDIAC ARRHYTHMIA N/A 2023    Procedure: Cardioversion or Defibrillation;  Surgeon: PJ Betancourt MD;  Location: [facility] EP LAB;  Service: Cardiology;  Laterality: N/A;  AF, MARIANO, DCCV, MAC, EH, 3 Prep    VENTRICULOGRAM, LEFT  2022    Procedure: Ventriculogram, Left;  Surgeon: Jorge Tran MD;  Location: [facility] CATH/EP LAB;  Service: Cardiology;;     Family History   Problem Relation Age of Onset    Alzheimer's disease Mother      Social History     Tobacco Use    Smoking status: Every Day     Current packs/day: 0.25     Average packs/day: 0.5 packs/day for 49.2 years (23.5 ttl pk-yrs)     Types: Cigarettes     Start date:      Passive exposure: Current    Smokeless tobacco: Never    Tobacco comments:     Pt states she is a 46 year smoker, smokes around 5-8 cigarettes per day. Interested in nicotine replacement while admitted. Information given on outpatient smoking cessation.   Substance Use Topics     Alcohol use: Yes     Comment: RARELY    Drug use: Never     Review of Systems   Constitutional:  Positive for activity change and fatigue. Negative for appetite change, chills and fever.   HENT: Negative.  Negative for congestion, ear pain, rhinorrhea, sore throat and trouble swallowing.    Eyes: Negative.  Negative for photophobia, pain, redness and visual disturbance.   Respiratory:  Positive for shortness of breath (dyspnea with exertion). Negative for cough, chest tightness and wheezing.    Cardiovascular: Negative.  Negative for chest pain, palpitations and leg swelling.   Gastrointestinal: Negative.  Negative for abdominal distention, abdominal pain, blood in stool, constipation, diarrhea, nausea and vomiting.   Endocrine: Negative.    Genitourinary: Negative.  Negative for decreased urine volume, difficulty urinating, dysuria, flank pain, frequency, pelvic pain and urgency.   Musculoskeletal:  Positive for myalgias (left knee pain, progressively improving). Negative for back pain, neck pain and neck stiffness.   Skin:  Positive for pallor. Negative for rash.   Neurological: Negative.  Negative for dizziness, syncope, facial asymmetry, speech difficulty, light-headedness, numbness and headaches.   Psychiatric/Behavioral: Negative.  Negative for confusion.    All other systems reviewed and are negative.      Physical Exam     Initial Vitals [02/03/24 1534]   BP Pulse Resp Temp SpO2   (!) 146/71 88 (!) 22 98.5 °F (36.9 °C) 97 %      MAP       --         Physical Exam    Nursing note and vitals reviewed.  Constitutional: She is active and cooperative. She does not have a sickly appearance. She does not appear ill. No distress.   HENT:   Head: Normocephalic.       Nose: Nose normal.   Mouth/Throat: Uvula is midline, oropharynx is clear and moist and mucous membranes are normal. No oral lesions. No uvula swelling. No oropharyngeal exudate, posterior oropharyngeal edema or posterior oropharyngeal erythema.    Eschar/hematoma with ecchymosis to forehead appears to be healing.  No active bleeding no surrounding cellulitis or fluctuance.   Eyes: Conjunctivae, EOM and lids are normal. Pupils are equal, round, and reactive to light. No scleral icterus.   Neck: Trachea normal and phonation normal. Neck supple. No thyroid mass and no thyromegaly present. No stridor present. No JVD present.   Normal range of motion.   Full passive range of motion without pain.     Cardiovascular:  Normal rate, regular rhythm, normal heart sounds, intact distal pulses and normal pulses.     Exam reveals no gallop, no distant heart sounds, no friction rub and no decreased pulses.       No murmur heard.  Pulmonary/Chest: Effort normal and breath sounds normal. No accessory muscle usage or stridor. No tachypnea. No respiratory distress. She has no decreased breath sounds. She has no wheezes. She has no rhonchi. She has no rales.   Abdominal: Abdomen is soft. Bowel sounds are normal. She exhibits no distension, no pulsatile midline mass and no mass. There is no abdominal tenderness. There is no rigidity and no guarding.   Musculoskeletal:         General: No tenderness or edema. Normal range of motion.      Right hand: Normal. Normal range of motion. Normal strength. Normal sensation. Normal capillary refill. Normal pulse.      Left hand: Normal. Normal range of motion. Normal strength. Normal sensation. Normal capillary refill. Normal pulse.      Cervical back: Normal, full passive range of motion without pain, normal range of motion and neck supple. No edema, erythema, rigidity or bony tenderness. No pain with movement or spinous process tenderness. Normal range of motion.      Thoracic back: Normal. No bony tenderness. Normal range of motion.      Lumbar back: Normal. No bony tenderness. Normal range of motion.      Right foot: Normal. Normal range of motion and normal capillary refill. No tenderness or bony tenderness. Normal pulse.      Left  foot: Normal. Normal range of motion and normal capillary refill. No tenderness or bony tenderness. Normal pulse.      Comments: Pulses 2+ throughout, no extremity abnormalities     Neurological: She is alert and oriented to person, place, and time. She has normal strength. She displays normal reflexes. No cranial nerve deficit or sensory deficit. Gait normal.   No focal deficits   Skin: Skin is warm, dry and intact. Capillary refill takes less than 2 seconds. No ecchymosis, no petechiae and no rash noted. No erythema. No pallor.   Psychiatric: She has a normal mood and affect. Her speech is normal and behavior is normal. Judgment and thought content normal. Cognition and memory are normal.         ED Course   Procedures  Labs Reviewed   CBC W/ AUTO DIFFERENTIAL - Abnormal; Notable for the following components:       Result Value    RBC 2.50 (*)     Hemoglobin 6.8 (*)     Hematocrit 23.7 (*)     MCHC 28.7 (*)     RDW 17.1 (*)     Immature Granulocytes 1.0 (*)     Immature Grans (Abs) 0.06 (*)     Lymph # 0.6 (*)     Gran % 78.5 (*)     Lymph % 9.1 (*)     All other components within normal limits    Narrative:     hgb   critical result(s) called and verbal readback obtained from   bradly malloy rn by CAF 02/03/2024 16:46   COMPREHENSIVE METABOLIC PANEL - Abnormal; Notable for the following components:    Sodium 135 (*)     Creatinine 2.0 (*)     Calcium 8.3 (*)     Total Protein 5.6 (*)     Albumin 3.4 (*)     ALT 8 (*)     eGFR 26.4 (*)     Anion Gap 6 (*)     All other components within normal limits   B-TYPE NATRIURETIC PEPTIDE - Abnormal; Notable for the following components:     (*)     All other components within normal limits   COMPREHENSIVE METABOLIC PANEL - Abnormal; Notable for the following components:    Sodium 135 (*)     Creatinine 2.0 (*)     Calcium 8.4 (*)     ALT 9 (*)     eGFR 26.4 (*)     Anion Gap 7 (*)     All other components within normal limits   CBC W/ AUTO DIFFERENTIAL - Abnormal;  Notable for the following components:    RBC 2.93 (*)     Hemoglobin 8.1 (*)     Hematocrit 27.3 (*)     MCHC 29.7 (*)     RDW 17.2 (*)     Immature Granulocytes 0.6 (*)     Lymph # 0.5 (*)     Gran % 84.5 (*)     Lymph % 8.2 (*)     All other components within normal limits   MAGNESIUM   TROPONIN I HIGH SENSITIVITY   LIPASE   CK   MAGNESIUM   TSH   LACTIC ACID, PLASMA   VITAMIN B12   TYPE & SCREEN   PREPARE RBC SOFT        ECG Results              EKG 12-lead (Final result)  Result time 02/04/24 10:56:25      Final result by Interface, [facility] (02/04/24 10:56:25)                   Narrative:    Test Reason : D64.9,    Vent. Rate : 081 BPM     Atrial Rate : 061 BPM     P-R Int : 000 ms          QRS Dur : 090 ms      QT Int : 414 ms       P-R-T Axes : 000 059 061 degrees     QTc Int : 480 ms    Atrial fibrillation  Septal infarct ,age undetermined  Abnormal ECG  When compared with ECG of 30-NOV-2023 15:03,  Non-specific change in ST segment in Inferior leads  T wave inversion no longer evident in Inferior leads  Confirmed by Marco LEE, Oli REID ([id number]) on 2/4/2024 10:56:12 AM    Referred By: [email or web address]   SELF           Confirmed By:Oli Lara MD                                  Imaging Results              US Lower Extremity Arteries Left (Final result)  Result time 02/04/24 01:25:23      Final result by Roselyn Espino DO (02/04/24 01:25:23)                   Narrative:    EXAM:  US Duplex Left Lower Extremity Arteries    CLINICAL HISTORY:  LLE numbness.    TECHNIQUE:  Real-time duplex ultrasound scan of the left lower extremity arteries integrating B-mode two-dimensional vascular structure, Doppler spectral analysis and color flow Doppler imaging.    COMPARISON:  No relevant prior studies available.    FINDINGS:  Left common femoral artery:  Peak systolic velocity in the left common femoral artery is 106 cm/s.  Monophasic waveform.  Left deep femoral artery:  Peak systolic velocity in the left deep  femoral artery is 38 cm/s.  Monophasic waveform.  Left superficial femoral artery:  Peak systolic velocity in the left distal superficial femoral artery is 57 cm/s.  Monophasic waveform.  Peak systolic velocity in the left proximal superficial femoral artery is 77 cm/s.  Peak systolic velocity in the left mid superficial femoral artery is 37 cm/s.  Left popliteal artery:  Peak systolic velocity in the left popliteal artery is 17 cm/s.  Monophasic waveform.  Left calf/foot arteries:  Peak systolic velocity in the left dorsalis pedis artery is 15 cm/s.  Monophasic waveform.  Peak systolic velocity in the left mid posterior tibial artery is 46 cm/s.  Peak systolic velocity in the left mid peroneal artery is 33 cm/s.  Peak systolic velocity in the left anterior tibial artery is 42 cm/s.  Soft tissues:  Soft tissue edema.    IMPRESSION:  1.  No significant left lower extremity arterial stenosis is demonstrated sonographically.  2.  Monophasic waveforms throughout suggest of inflow disease.    Electronically signed by:  Balbina Wallace MD  02/04/2024 01:25 AM CST Workstation: [id number]                                     X-Ray Wrist Complete Left (Final result)  Result time 02/04/24 08:01:36   Procedure changed from X-Ray Wrist 2 View Left     Final result by Bryson Lund MD (02/04/24 08:01:36)                   Narrative:    Reason: pain    FINDINGS:    4 views of the left wrist a faint linear lucency at the distal radius. No dislocation or destructive osseous lesions. There are mild degenerative changes of the wrist joints. No gross soft tissue abnormality.    IMPRESSION:    Faint linear lucency at the distal radius could reflect an occult fracture. Consider further evaluation with CT.    Electronically signed by:  Bryson Lund DO  02/04/2024 08:01 AM CST Workstation: [id number]                                     X-Ray Knee 1 or 2 View Right (Final result)  Result time 02/04/24 08:00:18   Procedure changed  from X-Ray Knee Complete 4 or more Views Right     Final result by Bryson Lund MD (02/04/24 08:00:18)                   Narrative:    Reason: pain    FINDINGS:    AP and lateral radiographs of the right knee demonstrate post surgical changes of total right knee arthroplasty. There is no acute fracture, dislocation or destructive osseous lesion. The orthopedic hardware is intact. Mild soft tissue swelling about the knee.    IMPRESSION:    1.  Intact postsurgical changes of total right knee arthroplasty.  2.  No acute osseous abnormality.    Electronically signed by:  Bryson Lund DO  02/04/2024 08:00 AM CST Workstation: [id number]                                     MRI Knee Without Contrast Left (Final result)  Result time 02/03/24 23:44:03      Final result by Checo Langston MD (02/03/24 23:44:03)                   Narrative:    INDICATION: L knee swelling    EXAMINATION: MRI - left MR KNEE WITHOUT IV CONTRAST LEFT      TECHNIQUE: Multiplanar and multisequence MR images of the left knee.    IV Contrast Dosage and Agent: None.    COMPARISON: None.  ____________________________________________    FINDINGS: Study is limited by body habitus.    BONE: There is no occult fracture or bone marrow edema. There is an area of increased T2 signal and decreased T1 signal within the proximal tibial metaphysis. 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 This does not appear aggressive.    JOINT: There is a small joint effusion. There is no evidence of a loose body.    MUSCLES: Unremarkable.    MENISCI: There is no displaced meniscal tear. There is blunting of the body and anterior horn of the medial meniscus. This could be due to meniscal degeneration or prior meniscectomy. No displaced fragments are seen. There is no meniscal extrusion.    CRUCIATE LIGAMENTS: Anterior and posterior cruciate ligaments are intact.    COLLATERAL LIGAMENTS: Medial collateral ligament and lateral collateral ligamentous structures are  intact. Patellar femoral ligaments appear normal.    CARTILAGE: There is cartilage thinning within the knee. This is most severe in the medial compartment. There is some mild subchondral cystic changes within the medial femoral condyle and also within the patella    OTHER SOFT TISSUES: There is a large complex fluid collection anterior to the patella and patellar tendon. This measures 9.9 x 8.8 x 3.4 cm. Findings are consistent with prepatellar bursitis.    IMPRESSION:  1.  Large complex fluid collection anterior to the patella and patellar tendon. Findings are consistent with prepatellar bursitis.  2.  There is blunting of the body and anterior horn of the medial meniscus. This could be due to meniscal degeneration or prior meniscectomy. There are no displaced fragments or meniscal extrusion.  3.  Degenerative changes are noted within the knee with cartilage thinning most severe in the medial compartment.  4.  Area of abnormal signal within the proximal tibial metaphysis may represent medullary bone infarct. This does not appear aggressive.    Electronically signed by:  Checo Langston MD  02/03/2024 11:44 PM CST Workstation: [id number]                                     CT Abdomen Pelvis  Without Contrast (Final result)  Result time 02/03/24 22:04:54      Final result by Henrik Rebollar MD (02/03/24 22:04:54)                   Narrative:    EXAM DESCRIPTION:  CT ABDOMEN PELVIS WITHOUT CONTRAST  RadLex: CT ABDOMEN PELVIS WITHOUT IV CONTRAST    CLINICAL HISTORY:  68 years  Female;  Epigastric pain    TECHNOLOGIST NOTES:    TECHNIQUE: Helical CT imaging was obtained of the abdomen and pelvis with multiplanar reconstructions. This exam was performed according to our departmental dose-optimization program, which includes automated exposure control, adjustment of the mA and/or kV according to patient size and/or use of iterative reconstruction techniques.    COMPARISON: None currently available    FINDINGS:  Abdomen:  No  evidence of acute disease in the visualized lung bases.  There is calcification in the coronary arteries. The liver is 23.3 cm.  The liver, spleen, pancreas, adrenal glands and kidneys show no acute abnormality. No evidence of acute pancreatitis.    There are no calcified gallstones. There is no gallbladder wall thickening. No pericholecystic fluid.  There is no gross biliary duct dilatation.  No significant hydronephrosis bilaterally. There is a 1.7 cm exophytic cyst from the left kidney with a mean Hounsfield measurement of 8.8. No follow-up imaging recommended. Exam limited by artifact, probably related to patient body habitus    No free air.    There is no significant free fluid.    There is no significant aneurysmal enlargement of the abdominal aorta. Moderate vascular calcifications.    Pelvis:  There is no evidence of bowel obstruction.    No herniated bowel loops.  . No focal inflammatory changes . The appendix is unremarkable. There is colonic diverticulosis without evidence of acute diverticulitis.  Evaluation of the bowel is limited without oral contrast or with incomplete bowel opacification.   There is mild free fluid in the pelvis. The bladder is grossly unremarkable.    No acute osseous finding. There is multilevel degenerative disc disease and vacuum disc phenomenon. There is also bilateral facet arthropathy.  There is a mild scoliosis.    IMPRESSION:  1.  No bowel obstruction, herniated bowel loops or focal inflammatory changes.  2.  Uncomplicated colonic diverticulosis  3.  Evaluation is limited without contrast and by patient body habitus  4.  Please see body of report for non-critical findings.    Electronically signed by:  Henrik Rebollar MD  02/03/2024 10:04 PM [facility] Workstation: [id number]                                     X-Ray Chest AP Portable (Final result)  Result time 02/03/24 16:38:24      Final result by Roselyn Espino DO (02/03/24 16:38:24)                   Narrative:    AP chest  radiograph: 2/3/2024 4:37 PM CST    History: 68 years  old Female with anemia.    Comparison: 12/22/22    Findings: The cardiomediastinal silhouette is enlarged. Atherosclerotic calcifications are seen at the aortic arch.    There are bilateral airspace opacities with interstitial and central vascular prominence.    No pneumothorax is seen.    There is blunting of both costophrenic angles, suggestive of trace effusions.    There is partial obscuration of both diaphragms, likely due to overlying airspace opacities and/or effusions.    Impression: There are bilateral airspace opacities with interstitial and central vascular prominence. These findings may reflect evidence of edema.    Electronically signed by:  Roselyn Espino DO  02/03/2024 04:38 PM CST Workstation: [id number]                                     Medications   meperidine (PF) injection 12.5 mg (has no administration in time range)   mupirocin 2 % ointment (1 g Nasal Given 2/8/24 0854)   vancomycin in dextrose 5 % 1 gram/250 mL IVPB 2,000 mg (0 mg Intravenous Stopped 2/4/24 0407)   vancomycin 500 mg in dextrose 5 % 100 mL IVPB (ready to mix) (0 mg Intravenous Stopped 2/6/24 1259)   ferric gluconate (FERRLECIT) 125 mg in sodium chloride 0.9% 100 mL IVPB (0 mg Intravenous Stopped 2/8/24 1918)   amLODIPine tablet 5 mg (5 mg Oral Given 2/9/24 0048)   tuberculin injection 5 Units (5 Units Intradermal Given 2/9/24 1713)     Medical Decision Making  Amount and/or Complexity of Data Reviewed  Labs: ordered.  Radiology: ordered.    Risk  Prescription drug management.  Decision regarding hospitalization.                                      Clinical Impression:  Final diagnoses:  [D64.9] Symptomatic anemia          ED Disposition Condition    Admit Stable                Marilia Gutierrez MD  02/27/24 [id number]

## 2024-02-04 NOTE — PROGRESS NOTES
[facility]  Progress Note    Patient Name: Iris Mueller  MRN: [id number]  Patient Class: IP- Inpatient   Admission Date: 2/3/2024  Length of Stay: 1 days  Attending Physician: Ramiro Damon MD  Primary Care Provider: Elkin You MD        Subjective:     Principal Problem:Acute blood loss anemia        HPI:  No notes on file    Overview/Hospital Course:  2/4/2024  Ms Mueller has difficulty with left knee pain  6/10 and difficulty walking due to left knee pain /dysfunction  She has no other complaints    Interval History: Ms Mueller has a full thicknessnecrotic area left knee. Dr Marie will debride and possibly close the wound    Review of Systems   Constitutional:  Positive for activity change, diaphoresis and fatigue.   HENT: Negative.     Eyes: Negative.    Respiratory: Negative.     Cardiovascular: Negative.    Gastrointestinal: Negative.    Endocrine: Positive for cold intolerance.   Genitourinary: Negative.    Musculoskeletal:  Positive for arthralgias, gait problem, joint swelling and myalgias.   Skin:  Positive for wound.   Allergic/Immunologic: Negative.    Neurological:  Positive for weakness.   Hematological:  Bruises/bleeds easily.   Psychiatric/Behavioral:  The patient is nervous/anxious.      Objective:     Vital Signs (Most Recent):  Temp: 98.2 °F (36.8 °C) (02/04/24 1128)  Pulse: 80 (02/04/24 1136)  Resp: 16 (02/04/24 1136)  BP: 96/60 (02/04/24 1128)  SpO2: (!) 94 % (02/04/24 1300) Vital Signs (24h Range):  Temp:  [97.7 °F (36.5 °C)-98.5 °F (36.9 °C)] 98.2 °F (36.8 °C)  Pulse:  [75-94] 80  Resp:  [15-25] 16  SpO2:  [93 %-98 %] 94 %  BP: ()/(60-92) 96/60     Weight: 115.8 kg (255 lb 4.8 oz)  Body mass index is 45.22 kg/m².    Intake/Output Summary (Last 24 hours) at 2/4/2024 1448  Last data filed at 2/4/2024 1128  Gross per 24 hour   Intake 900 ml   Output 600 ml   Net 300 ml         Physical Exam  Vitals and nursing note reviewed.    Constitutional:       General: She is not in acute distress.     Appearance: She is not ill-appearing.   HENT:      Head: Normocephalic and atraumatic.      Nose: Nose normal.      Mouth/Throat:      Mouth: Mucous membranes are moist.   Eyes:      Extraocular Movements: Extraocular movements intact.      Pupils: Pupils are equal, round, and reactive to light.   Cardiovascular:      Rate and Rhythm: Normal rate and regular rhythm.   Pulmonary:      Effort: Pulmonary effort is normal.      Breath sounds: Normal breath sounds.   Abdominal:      General: Bowel sounds are normal.   Musculoskeletal:         General: Normal range of motion.      Cervical back: Normal range of motion and neck supple.   Skin:     General: Skin is warm.   Neurological:      Mental Status: She is alert and oriented to person, place, and time.   Psychiatric:         Mood and Affect: Mood normal.             Significant Labs: All pertinent labs within the past 24 hours have been reviewed.  Recent Lab Results         02/04/24  0138   02/04/24  0129   02/03/24  1558        Unit Blood Type Code     1700       Unit Expiration     [id number]       Unit Blood Type     B NEG       Albumin 3.6     3.4            106       ALT 9     8       Anion Gap 7     6       AST 10     10       Baso # 0.02     0.02       Basophil % 0.3     0.3       BILIRUBIN TOTAL 0.5  Comment: For infants and newborns, interpretation of results should be based  on gestational age, weight and in agreement with clinical  observations.    Premature Infant recommended reference ranges:  Up to 24 hours.............<8.0 mg/dL  Up to 48 hours............<12.0 mg/dL  3-5 days..................<15.0 mg/dL  6-29 days.................<15.0 mg/dL       0.4  Comment: For infants and newborns, interpretation of results should be based  on gestational age, weight and in agreement with clinical  observations.    Premature Infant recommended reference ranges:  Up to 24  hours.............<8.0 mg/dL  Up to 48 hours............<12.0 mg/dL  3-5 days..................<15.0 mg/dL  6-29 days.................<15.0 mg/dL         Blood Culture, Routine No Growth to date  [P]   No Growth to date  [P]         BNP     477  Comment: Values of less than 100 pg/ml are consistent with non-CHF populations.       BUN 21     22       Calcium 8.4     8.3       Chloride 103     104       CO2 25     25       CODING SYSTEM     [id number]       CPK     25       Creatinine 2.0     2.0       Crossmatch Interpretation     Compatible       Differential Method Automated     Automated       DISPENSE STATUS     TRANSFUSED       eGFR 26.4     26.4       Eos # 0.1     0.2       Eos % 2.0     2.5       Glucose 103     85       Gran # (ANC) 5.6     4.9       Gran % 84.5     78.5       Group & Rh     B NEG       Hematocrit 27.3     23.7       Hemoglobin 8.1     6.8  Comment: hgb   critical result(s) called and verbal readback obtained from   bradly malloy rn by CAF 02/03/2024 16:46         Immature Grans (Abs) 0.04  Comment: Mild elevation in immature granulocytes is non specific and   can be seen in a variety of conditions including stress response,   acute inflammation, trauma and pregnancy. Correlation with other   laboratory and clinical findings is essential.       0.06  Comment: Mild elevation in immature granulocytes is non specific and   can be seen in a variety of conditions including stress response,   acute inflammation, trauma and pregnancy. Correlation with other   laboratory and clinical findings is essential.         Immature Granulocytes 0.6     1.0       INDIRECT KAYLIE     NEG       Lactic Acid Level 0.6  Comment: Falsely low lactic acid results can be found in samples   containing >=13.0 mg/dL total bilirubin and/or >=3.5 mg/dL   direct bilirubin.             Lipase     11       Lymph # 0.5     0.6       Lymph % 8.2     9.1       Magnesium      2.0            2.0       MCH 27.6     27.2       MCHC  29.7     28.7       MCV 93     95       Mono # 0.3     0.5       Mono % 4.4     8.6       MPV 9.4     9.7       nRBC 0     0       Platelet Count 408     387       Potassium 4.1     4.2       Product Code     [id number]       PROTEIN TOTAL 6.3     5.6       RBC 2.93     2.50       RDW 17.2     17.1       Sodium 135     135       Specimen Outdate     02/06/2024 23:59       Troponin I High Sensitivity     6.5  Comment: Troponin results differ between methods. Do not use   results between Troponin methods interchangeably.    Alkaline Phospatase levels above 400 U/L may   cause false positive results.    Access hsTnI should not be used for patients taking   Asfotase selene (Strensiq).         TSH     0.881       UNIT NUMBER     [id number]       Vitamin B12 360           WBC 6.60     6.29                [P] - Preliminary Result               Significant Imaging: I have reviewed all pertinent imaging results/findings within the past 24 hours.    Assessment/Plan:      Traumatic hematoma of knee, left, initial encounter  Pt has a full thickness necrotic area left knee which Dr Marie will debride tomorrow  Continue ceftriaxone and vancomycin        VTE Risk Mitigation (From admission, onward)           Ordered     IP VTE HIGH RISK PATIENT  Once         02/03/24 1935                    Discharge Planning   VALERIANO:      Code Status: Full Code   Is the patient medically ready for discharge?:     Reason for patient still in hospital (select all that apply): Patient new problem, Treatment, and Consult recommendations  Discharge Plan A: Home Health                  Ramiro Damon MD  Department of Hospital Medicine   [facility]

## 2024-02-04 NOTE — PLAN OF CARE
Problem: Adult Inpatient Plan of Care  Goal: Plan of Care Review  Outcome: Ongoing, Not Progressing  Goal: Patient-Specific Goal (Individualized)  Outcome: Ongoing, Not Progressing  Goal: Absence of Hospital-Acquired Illness or Injury  Outcome: Ongoing, Not Progressing  Goal: Optimal Comfort and Wellbeing  Outcome: Ongoing, Not Progressing  Goal: Readiness for Transition of Care  Outcome: Ongoing, Not Progressing     Problem: Bariatric Environmental Safety  Goal: Safety Maintained with Care  Outcome: Ongoing, Not Progressing     Problem: Fall Injury Risk  Goal: Absence of Fall and Fall-Related Injury  Outcome: Ongoing, Not Progressing     Problem: Skin Injury Risk Increased  Goal: Skin Health and Integrity  Outcome: Ongoing, Not Progressing     Problem: Impaired Wound Healing  Goal: Optimal Wound Healing  Outcome: Ongoing, Not Progressing

## 2024-02-04 NOTE — ASSESSMENT & PLAN NOTE
Pt has a full thickness necrotic area left knee which Dr Marie will debride tomorrow  Continue ceftriaxone and vancomycin

## 2024-02-04 NOTE — SUBJECTIVE & OBJECTIVE
Interval History: Ms Mueller has a full thicknessnecrotic area left knee. Dr Marie will debride and possibly close the wound    Review of Systems   Constitutional:  Positive for activity change, diaphoresis and fatigue.   HENT: Negative.     Eyes: Negative.    Respiratory: Negative.     Cardiovascular: Negative.    Gastrointestinal: Negative.    Endocrine: Positive for cold intolerance.   Genitourinary: Negative.    Musculoskeletal:  Positive for arthralgias, gait problem, joint swelling and myalgias.   Skin:  Positive for wound.   Allergic/Immunologic: Negative.    Neurological:  Positive for weakness.   Hematological:  Bruises/bleeds easily.   Psychiatric/Behavioral:  The patient is nervous/anxious.      Objective:     Vital Signs (Most Recent):  Temp: 98.2 °F (36.8 °C) (02/04/24 1128)  Pulse: 80 (02/04/24 1136)  Resp: 16 (02/04/24 1136)  BP: 96/60 (02/04/24 1128)  SpO2: (!) 94 % (02/04/24 1300) Vital Signs (24h Range):  Temp:  [97.7 °F (36.5 °C)-98.5 °F (36.9 °C)] 98.2 °F (36.8 °C)  Pulse:  [75-94] 80  Resp:  [15-25] 16  SpO2:  [93 %-98 %] 94 %  BP: ()/(60-92) 96/60     Weight: 115.8 kg (255 lb 4.8 oz)  Body mass index is 45.22 kg/m².    Intake/Output Summary (Last 24 hours) at 2/4/2024 1448  Last data filed at 2/4/2024 1128  Gross per 24 hour   Intake 900 ml   Output 600 ml   Net 300 ml         Physical Exam  Vitals and nursing note reviewed.   Constitutional:       General: She is not in acute distress.     Appearance: She is not ill-appearing.   HENT:      Head: Normocephalic and atraumatic.      Nose: Nose normal.      Mouth/Throat:      Mouth: Mucous membranes are moist.   Eyes:      Extraocular Movements: Extraocular movements intact.      Pupils: Pupils are equal, round, and reactive to light.   Cardiovascular:      Rate and Rhythm: Normal rate and regular rhythm.   Pulmonary:      Effort: Pulmonary effort is normal.      Breath sounds: Normal breath sounds.   Abdominal:      General: Bowel  sounds are normal.   Musculoskeletal:         General: Normal range of motion.      Cervical back: Normal range of motion and neck supple.   Skin:     General: Skin is warm.   Neurological:      Mental Status: She is alert and oriented to person, place, and time.   Psychiatric:         Mood and Affect: Mood normal.             Significant Labs: All pertinent labs within the past 24 hours have been reviewed.  Recent Lab Results         02/04/24  0138   02/04/24  0129   02/03/24  1558        Unit Blood Type Code     1700       Unit Expiration     [id number]       Unit Blood Type     B NEG       Albumin 3.6     3.4            106       ALT 9     8       Anion Gap 7     6       AST 10     10       Baso # 0.02     0.02       Basophil % 0.3     0.3       BILIRUBIN TOTAL 0.5  Comment: For infants and newborns, interpretation of results should be based  on gestational age, weight and in agreement with clinical  observations.    Premature Infant recommended reference ranges:  Up to 24 hours.............<8.0 mg/dL  Up to 48 hours............<12.0 mg/dL  3-5 days..................<15.0 mg/dL  6-29 days.................<15.0 mg/dL       0.4  Comment: For infants and newborns, interpretation of results should be based  on gestational age, weight and in agreement with clinical  observations.    Premature Infant recommended reference ranges:  Up to 24 hours.............<8.0 mg/dL  Up to 48 hours............<12.0 mg/dL  3-5 days..................<15.0 mg/dL  6-29 days.................<15.0 mg/dL         Blood Culture, Routine No Growth to date  [P]   No Growth to date  [P]         BNP     477  Comment: Values of less than 100 pg/ml are consistent with non-CHF populations.       BUN 21     22       Calcium 8.4     8.3       Chloride 103     104       CO2 25     25       CODING SYSTEM     [id number]       CPK     25       Creatinine 2.0     2.0       Crossmatch Interpretation     Compatible       Differential Method Automated      Automated       DISPENSE STATUS     TRANSFUSED       eGFR 26.4     26.4       Eos # 0.1     0.2       Eos % 2.0     2.5       Glucose 103     85       Gran # (ANC) 5.6     4.9       Gran % 84.5     78.5       Group & Rh     B NEG       Hematocrit 27.3     23.7       Hemoglobin 8.1     6.8  Comment: hgb   critical result(s) called and verbal readback obtained from   bradly malloy rn by CAF 02/03/2024 16:46         Immature Grans (Abs) 0.04  Comment: Mild elevation in immature granulocytes is non specific and   can be seen in a variety of conditions including stress response,   acute inflammation, trauma and pregnancy. Correlation with other   laboratory and clinical findings is essential.       0.06  Comment: Mild elevation in immature granulocytes is non specific and   can be seen in a variety of conditions including stress response,   acute inflammation, trauma and pregnancy. Correlation with other   laboratory and clinical findings is essential.         Immature Granulocytes 0.6     1.0       INDIRECT KAYLIE     NEG       Lactic Acid Level 0.6  Comment: Falsely low lactic acid results can be found in samples   containing >=13.0 mg/dL total bilirubin and/or >=3.5 mg/dL   direct bilirubin.             Lipase     11       Lymph # 0.5     0.6       Lymph % 8.2     9.1       Magnesium      2.0            2.0       MCH 27.6     27.2       MCHC 29.7     28.7       MCV 93     95       Mono # 0.3     0.5       Mono % 4.4     8.6       MPV 9.4     9.7       nRBC 0     0       Platelet Count 408     387       Potassium 4.1     4.2       Product Code     [id number]       PROTEIN TOTAL 6.3     5.6       RBC 2.93     2.50       RDW 17.2     17.1       Sodium 135     135       Specimen Outdate     02/06/2024 23:59       Troponin I High Sensitivity     6.5  Comment: Troponin results differ between methods. Do not use   results between Troponin methods interchangeably.    Alkaline Phospatase levels above 400 U/L may   cause false  positive results.    Access hsTnI should not be used for patients taking   Asfotase selene (Strensiq).         TSH     0.881       UNIT NUMBER     [id number]       Vitamin B12 360           WBC 6.60     6.29                [P] - Preliminary Result               Significant Imaging: I have reviewed all pertinent imaging results/findings within the past 24 hours.

## 2024-02-04 NOTE — HPI
69 y/o F on 1/12/24, pt fell and hit floor with her forehead, left knee and left wrist. She went to ER that day and had 15 sutures placed.CT head and C-spine w/o fx, XRays of hip and knee without fx. Pt has had sutures removed as outpatient. She saw her family medicine provder 1/22 who noted she was improving but still had hematoma to knee, laceration to forehead. She was given antibiotics for the left knee hematoma.  Patient states that the pain is improving and not as tender laterally but now has full-thickness skin necrosis medially and another area a little more superiorly and laterally that is questionable.

## 2024-02-04 NOTE — HOSPITAL COURSE
69 y/o F hx PAF, aortic stenosis, aortic regurgitaiton, a history of mildly reduced systolic function with recovery - most recent LVEF 50-55%, pulmonary hypertension, nonobstructive coronary artery disease of the LAD and RCA, hypertension, hyperlipidemia, COPD, osteoarthritis, and obesity was admitted with fall injury and left knee hematoma.  On 1/12/24, pt fell and hit floor with her forehead, left knee and left wrist. She went to ER that day and had 15 sutures placed.CT head and C-spine w/o fx, XRays of hip and knee without fx. Pt has had sutures removed as outpatient. She saw her family medicine provder and blood  work done and showed Hgb 6.9 and sent here .  Patient was transfused with a blood and hemoglobin is stable.  Anemia profile consistent with iron-deficiency anemia and IV iron was given.  Later patient was seen by orthopedic surgeon and brought to the OR for the necrotic area of the skin secondary to traumatic hematoma was deprived a wound VAC placed and patient was discharged in stable condition with HH and wound care.  Plan to place her to skilled nursing facility but later patient declined and wanted to go home.  Follow up given with orthopedic and GI as outpatient.

## 2024-02-04 NOTE — H&P
[facility] - Emergency Dept    History & Physical      Patient Name: Iris Mueller  MRN: [id number]  Admission Date: 2/3/2024  Attending Physician: Yg Bourne MD   Primary Care Provider: Elkin You MD         Patient information was obtained from patient, past medical records, and ER records.     Subjective:     Principal Problem:Acute blood loss anemia    Chief Complaint:   Chief Complaint   Patient presents with    ABNORMAL LABS     LOW H/H. PCP CALLED AND TOLD PT TO COME TO ER, BLOOD DRAWN YESTERDAY    Shortness of Breath     MORE THAN NORMAL        HPI: 69 y/o F hx PAF, aortic stenosis, aortic regurgitaiton, a history of mildly reduced systolic function with recovery - most recent LVEF 50-55%, pulmonary hypertension, nonobstructive coronary artery disease of the LAD and RCA, hypertension, hyperlipidemia, COPD, osteoarthritis, and obesity.    On 1/12/24, pt fell and hit floor with her forehead, left knee and left wrist. She went to ER that day and had 15 sutures placed.CT head and C-spine w/o fx, XRays of hip and knee without fx. Pt has had sutures removed as outpatient. She saw her family medicine provder 1/22 who noted she was improving but still had hematoma to knee, laceration to forehead. She was given antibiotics for the left knee hematoma.       Pt had bloodwork done today ordered by PMD that showed Hgb 6.9. Pt has some sob, lightheadedness and feeling pale. It was 9.9 three weeks ago and 12.7 five months ago. Denies clinical GI blood loss. She is able to bear partial weight of LLE.     Past Medical History:   Diagnosis Date    Anemia, unspecified     Arthritis     Asthma     COPD (chronic obstructive pulmonary disease)     Encounter for blood transfusion     Hypertension     Obese body habitus     Wound infection 08/2019    Right knee       Past Surgical History:   Procedure Laterality Date    ANGIOGRAM, CORONARY, WITH LEFT HEART CATHETERIZATION N/A 12/23/2022     Procedure: Angiogram, Coronary, with Left Heart Cath;  Surgeon: Jorge Tran MD;  Location: [facility] CATH/EP LAB;  Service: Cardiology;  Laterality: N/A;     SECTION      ECHOCARDIOGRAM,TRANSESOPHAGEAL N/A 2023    Procedure: Transesophageal echo (MARIANO) intra-procedure log documentation;  Surgeon: Provider, Dosc Diagnostic;  Location: [facility] EP LAB;  Service: Cardiology;  Laterality: N/A;    JOINT REPLACEMENT      Knee    KNEE ARTHROPLASTY Right 2019    Procedure: ARTHROPLASTY, KNEE;  Surgeon: Delio Chung MD;  Location: [facility] OR;  Service: Orthopedics;  Laterality: Right;  anesthesia:  general and block    REPLACEMENT OF WOUND VACUUM-ASSISTED CLOSURE DEVICE Right 2019    Procedure: REPLACEMENT, WOUND VAC;  Surgeon: Delio Chung MD;  Location: [facility] OR;  Service: Orthopedics;  Laterality: Right;    TONSILLECTOMY      TREATMENT OF CARDIAC ARRHYTHMIA N/A 2023    Procedure: Cardioversion or Defibrillation;  Surgeon: PJ Betancourt MD;  Location: [facility] EP LAB;  Service: Cardiology;  Laterality: N/A;  AF, MARIANO, DCCV, MAC, EH, 3 Prep    VENTRICULOGRAM, LEFT  2022    Procedure: Ventriculogram, Left;  Surgeon: Jorge Tran MD;  Location: [facility] CATH/EP LAB;  Service: Cardiology;;       Review of patient's allergies indicates:  No Known Allergies    Current Facility-Administered Medications on File Prior to Encounter   Medication    lidocaine (PF) 10 mg/ml (1%) injection 5 mg     Current Outpatient Medications on File Prior to Encounter   Medication Sig    amiodarone (PACERONE) 200 MG Tab Take 1 tablet (200 mg total) by mouth once daily.    amLODIPine (NORVASC) 2.5 MG tablet Take 1 tablet (2.5 mg total) by mouth every evening.    apixaban (ELIQUIS) 5 mg Tab Take 1 tablet (5 mg total) by mouth 2 (two) times daily.    atorvastatin (LIPITOR) 10 MG tablet Take 1 tablet (10 mg total) by mouth every evening.    buPROPion (WELLBUTRIN SR) 150 MG TBSR 12 hr tablet Take 1 tablet (150 mg total) by  mouth 2 (two) times daily.    ergocalciferol (ERGOCALCIFEROL) 50,000 unit Cap Take 50,000 Units by mouth every 7 days.    FLUoxetine 40 MG capsule Take 1 capsule (40 mg total) by mouth once daily.    gabapentin (NEURONTIN) 300 MG capsule Take 1 capsule (300 mg total) by mouth 3 (three) times daily.    HYDROcodone-acetaminophen (NORCO) 5-325 mg per tablet Take 1 tablet by mouth every 6 (six) hours as needed for Pain.    loratadine (CLARITIN) 10 mg tablet Take 10 mg by mouth once daily.    metoprolol succinate (TOPROL-XL) 25 MG 24 hr tablet Take 2 tablets (50 mg total) by mouth once daily. (Patient taking differently: Take 25 mg by mouth 2 (two) times daily.)    mupirocin (BACTROBAN) 2 % ointment Apply topically 2 (two) times daily. (Patient taking differently: Apply 1 g topically 2 (two) times daily.)    traZODone (DESYREL) 50 MG tablet Take 2 tablets (100 mg total) by mouth nightly as needed for Insomnia.    albuterol (PROVENTIL/VENTOLIN HFA) 90 mcg/actuation inhaler Inhale 2 puffs into the lungs every 6 (six) hours as needed for Wheezing. Rescue    betamethasone dipropionate 0.05 % cream Apply topically 2 (two) times daily.    [DISCONTINUED] ferrous sulfate (FEOSOL) 325 mg (65 mg iron) Tab tablet Take 1 tablet (325 mg total) by mouth once daily.    [DISCONTINUED] furosemide (LASIX) 20 MG tablet Take 20 mg by mouth daily as needed for Shortness of Breath.    [DISCONTINUED] triamcinolone acetonide 0.1% (KENALOG) 0.1 % cream Apply topically 2 (two) times daily.     Family History       Problem Relation (Age of Onset)    Alzheimer's disease Mother          Tobacco Use    Smoking status: Every Day     Current packs/day: 0.25     Average packs/day: 0.5 packs/day for 49.1 years (23.5 ttl pk-yrs)     Types: Cigarettes     Start date: 1975     Passive exposure: Current    Smokeless tobacco: Never   Substance and Sexual Activity    Alcohol use: Yes     Comment: RARELY    Drug use: Never    Sexual activity: Not Currently      Partners: Male     Review of Systems  Objective:     Vital Signs (Most Recent):  Temp: 98.2 °F (36.8 °C) (02/03/24 1939)  Pulse: 92 (02/03/24 2200)  Resp: 16 (02/03/24 2200)  BP: 133/80 (02/03/24 2100)  SpO2: (!) 94 % (02/03/24 2200) Vital Signs (24h Range):  Temp:  [97.7 °F (36.5 °C)-98.5 °F (36.9 °C)] 98.2 °F (36.8 °C)  Pulse:  [75-92] 92  Resp:  [15-22] 16  SpO2:  [94 %-98 %] 94 %  BP: (122-146)/(65-85) 133/80     Weight: 108.9 kg (240 lb)  Body mass index is 46.87 kg/m².    Physical Exam  Constitutional:       Appearance: Normal appearance.   HENT:      Nose: Nose normal.      Mouth/Throat:      Mouth: Mucous membranes are moist.   Eyes:      Extraocular Movements: Extraocular movements intact.      Pupils: Pupils are equal, round, and reactive to light.   Cardiovascular:      Rate and Rhythm: Normal rate and regular rhythm.      Pulses: Normal pulses.      Heart sounds: Normal heart sounds.   Pulmonary:      Effort: Pulmonary effort is normal.      Breath sounds: Normal breath sounds.   Abdominal:      Palpations: Abdomen is soft.   Skin:     General: Skin is warm.      Capillary Refill: Capillary refill takes less than 2 seconds.      Comments: Forehead wound (see pictures), multiple knee wounds (see pictures). Limited L Knee ROM d/t pain/swellling.    Neurological:      General: No focal deficit present.      Mental Status: She is alert and oriented to person, place, and time.      Comments: L foot is less sensitive to touch than R foot. Mild weakness to dorsiflexion. No weakness to plantar flexion.    Psychiatric:         Mood and Affect: Mood normal.         Behavior: Behavior normal.           CRANIAL NERVES     CN III, IV, VI   Pupils are equal, round, and reactive to light.    L Knee wound from 2/3/2024        Compare to L knee from 1/29/2024      Compare to L knee from 1/22/2024      Forehead wound with eschar 2/3/2024, sutures with interval removal      Compare to 1/22/2024: forehead laceration,  sutures intact        Significant Labs: All pertinent labs within the past 24 hours have been reviewed.    Significant Imaging: I have reviewed all pertinent imaging results/findings within the past 24 hours.    Assessment/Plan:     Active Diagnoses:    Diagnosis Date Noted POA    PRINCIPAL PROBLEM:  Acute blood loss anemia [D62] 08/31/2019 Yes      Problems Resolved During this Admission:     VTE Risk Mitigation (From admission, onward)           Ordered     IP VTE HIGH RISK PATIENT  Once         02/03/24 1935                    Traumatic L knee hematoma and necrotic wound after fall  L knee cellulitis  LTraumatic forehead wound  LLE numbness-suspect peroneal nerve injury  Progressive anemia after fall-due hematoma, r/o GI bleed, RP bleed  Mild L wrist pain, mild R knee pain possibly from favoring R knee  CKD 4-baseline Cr 2.0    admit to med tele  1u prbc  hold PTA eliquis  trend h/h  vanc, ceftriaxone  CT abd/p w/o  orhto csx  Check B12  Check arterial US LLE  Avoid nephrotoxic agents  MRI L knee w/o  L wrist XR, R knee XR  PT OT CM csx, wound care csx    Yg Bourne MD  Department of Hospital Medicine   [facility] - Emergency Dept

## 2024-02-04 NOTE — PROGRESS NOTES
"Pharmacokinetic Initial Assessment: IV Vancomycin    Assessment/Plan:    Initiate intravenous vancomycin with loading dose of 2000 mg once with subsequent doses when random concentrations are less than 20 mcg/mL  Desired empiric serum trough concentration is 10 to 20 mcg/mL  Draw vancomycin random level on 02/05 at 0430.  Pharmacy will continue to follow and monitor vancomycin.      Please contact pharmacy at extension [phone] with any questions regarding this assessment.     Thank you for the consult,   Oni Prieto       Patient brief summary:  Iris Mueller is a 68 y.o. female initiated on antimicrobial therapy with IV Vancomycin for treatment of suspected skin & soft tissue infection    Drug Allergies:   Review of patient's allergies indicates:  No Known Allergies    Actual Body Weight:   108.9 kg    Renal Function:   Estimated Creatinine Clearance: 29.3 mL/min (A) (based on SCr of 2 mg/dL (H)).,     CBC (last 72 hours):  Recent Labs   Lab Result Units 02/02/24  0000 02/03/24  1558   WBC K/uL 6.3 6.29   Hemoglobin g/dL 6.9* 6.8*   Hematocrit % 23.1* 23.7*   Platelets K/uL 394 387   Gran % %  --  78.5*   Lymph % % 11.3 9.1*   Mono % % 6.4 8.6   Eosinophil % % 2.9 2.5   Basophil % % 0.5 0.3   Differential Method   --  Automated       Metabolic Panel (last 72 hours):  Recent Labs   Lab Result Units 02/02/24  0000 02/03/24  1558   Sodium mmol/L 132* 135*   Potassium mmol/L 4.6 4.2   Chloride mmol/L 99 104   CO2 mmol/L 24 25   Glucose mg/dL 87 85   Glucose, UA  NEGATIVE  --    BUN mg/dL 20 22   Creatinine mg/dL 1.89* 2.0*   Creatinine, Urine mg/dL 65  --    Albumin g/dL 3.4* 3.4*   Total Bilirubin mg/dL 0.5 0.4   Alkaline Phosphatase U/L 93 106   AST U/L 11 10   ALT U/L 8 8*   Magnesium mg/dL  --  2.0  2.0       Drug levels (last 3 results):  No results for input(s): "VANCOMYCINRA", "VANCORANDOM", "VANCOMYCINPE", "VANCOPEAK", "VANCOMYCINTR", "VANCOTROUGH" in the last 72 hours.    Microbiologic " Results:  Microbiology Results (last 7 days)       Procedure Component Value Units Date/Time    Blood culture #1 **CANNOT BE ORDERED STAT** [[id number]]     Order Status: Canceled Specimen: Blood     Blood culture #2 **CANNOT BE ORDERED STAT** [[id number]]     Order Status: Canceled Specimen: Blood

## 2024-02-04 NOTE — CONSULTS
[facility]  Orthopedics  Consult Note    Patient Name: Iris Mueller  MRN: [id number]  Admission Date: 2/3/2024  Hospital Length of Stay: 1 days  Attending Provider: Ramiro Damon MD  Primary Care Provider: Elkin You MD    Patient information was obtained from patient, past medical records, and ER records.     Inpatient consult to Orthopedics  Consult performed by: Bryson Huerta MD  Consult ordered by: Yg Bourne MD  Reason for consult: L knee hemtoma        Subjective:     Principal Problem:Acute blood loss anemia    Chief Complaint:   Chief Complaint   Patient presents with    ABNORMAL LABS     LOW H/H. PCP CALLED AND TOLD PT TO COME TO ER, BLOOD DRAWN YESTERDAY    Shortness of Breath     MORE THAN NORMAL        HPI: 69 y/o F on 1/12/24, pt fell and hit floor with her forehead, left knee and left wrist. She went to ER that day and had 15 sutures placed.CT head and C-spine w/o fx, XRays of hip and knee without fx. Pt has had sutures removed as outpatient. She saw her family medicine provder 1/22 who noted she was improving but still had hematoma to knee, laceration to forehead. She was given antibiotics for the left knee hematoma.  Patient states that the pain is improving and not as tender laterally but now has full-thickness skin necrosis medially and another area a little more superiorly and laterally that is questionable.    Past Medical History:   Diagnosis Date    Anemia, unspecified     Arthritis     Asthma     COPD (chronic obstructive pulmonary disease)     Encounter for blood transfusion     Hypertension     Obese body habitus     Wound infection 08/2019    Right knee       Past Surgical History:   Procedure Laterality Date    ANGIOGRAM, CORONARY, WITH LEFT HEART CATHETERIZATION N/A 12/23/2022    Procedure: Angiogram, Coronary, with Left Heart Cath;  Surgeon: Jorge Tran MD;  Location: [facility] CATH/EP LAB;  Service: Cardiology;  Laterality: N/A;      SECTION      ECHOCARDIOGRAM,TRANSESOPHAGEAL N/A 2023    Procedure: Transesophageal echo (MARIANO) intra-procedure log documentation;  Surgeon: Provider, Araceli Diagnostic;  Location: [facility] EP LAB;  Service: Cardiology;  Laterality: N/A;    JOINT REPLACEMENT      Knee    KNEE ARTHROPLASTY Right 2019    Procedure: ARTHROPLASTY, KNEE;  Surgeon: Delio Chung MD;  Location: [facility] OR;  Service: Orthopedics;  Laterality: Right;  anesthesia:  general and block    REPLACEMENT OF WOUND VACUUM-ASSISTED CLOSURE DEVICE Right 2019    Procedure: REPLACEMENT, WOUND VAC;  Surgeon: Delio Chung MD;  Location: [facility] OR;  Service: Orthopedics;  Laterality: Right;    TONSILLECTOMY      TREATMENT OF CARDIAC ARRHYTHMIA N/A 2023    Procedure: Cardioversion or Defibrillation;  Surgeon: PJ Betancourt MD;  Location: [facility] EP LAB;  Service: Cardiology;  Laterality: N/A;  AF, MARIANO, DCCV, MAC, EH, 3 Prep    VENTRICULOGRAM, LEFT  2022    Procedure: Ventriculogram, Left;  Surgeon: Jorge Tran MD;  Location: [facility] CATH/EP LAB;  Service: Cardiology;;       Review of patient's allergies indicates:  No Known Allergies    Current Facility-Administered Medications   Medication    0.9%  NaCl infusion (for blood administration)    albuterol nebulizer solution 2.5 mg    amiodarone tablet 200 mg    amLODIPine tablet 2.5 mg    atorvastatin tablet 10 mg    buPROPion TBSR 12 hr tablet 150 mg    ceftaroline (TEFLARO) 400 mg in dextrose 5 % 50 mL IVPB (ready to mix)    cefTRIAXone (ROCEPHIN) 1 g in dextrose 5 % 100 mL IVPB (ready to mix)    cetirizine tablet 10 mg    dextrose 50% injection 12.5 g    dextrose 50% injection 25 g    FLUoxetine capsule 40 mg    gabapentin capsule 300 mg    glucagon (human recombinant) injection 1 mg    glucose chewable tablet 16 g    glucose chewable tablet 24 g    HYDROcodone-acetaminophen 7.5-325 mg per tablet 1 tablet    melatonin tablet 9 mg    metoprolol succinate (TOPROL-XL) 24 hr  tablet 25 mg    mupirocin 2 % ointment    naloxone 0.4 mg/mL injection 0.02 mg    ondansetron injection 4 mg    sodium chloride 0.9% flush 10 mL    traZODone tablet 100 mg    vancomycin - pharmacy to dose     Facility-Administered Medications Ordered in Other Encounters   Medication    lidocaine (PF) 10 mg/ml (1%) injection 5 mg     Family History       Problem Relation (Age of Onset)    Alzheimer's disease Mother          Tobacco Use    Smoking status: Every Day     Current packs/day: 0.25     Average packs/day: 0.5 packs/day for 49.1 years (23.5 ttl pk-yrs)     Types: Cigarettes     Start date: 1975     Passive exposure: Current    Smokeless tobacco: Never   Substance and Sexual Activity    Alcohol use: Yes     Comment: RARELY    Drug use: Never    Sexual activity: Not Currently     Partners: Male     Review of Systems   Constitutional: Negative for chills and fever.   HENT:  Negative for congestion.    Eyes:  Negative for blurred vision.   Cardiovascular:  Negative for chest pain and syncope.   Respiratory:  Negative for cough and shortness of breath.    Endocrine: Negative for polyuria.   Hematologic/Lymphatic: Negative for bleeding problem. Does not bruise/bleed easily.   Skin:  Negative for rash.   Musculoskeletal:  Positive for joint pain and joint swelling. Negative for falls, muscle cramps, muscle weakness and myalgias.   Gastrointestinal:  Negative for abdominal pain, nausea and vomiting.   Genitourinary:  Negative for flank pain.   Neurological:  Negative for numbness and seizures.   Psychiatric/Behavioral:  Negative for altered mental status.    Allergic/Immunologic: Negative for persistent infections.     Objective:     Vital Signs (Most Recent):  Temp: 98.2 °F (36.8 °C) (02/04/24 1128)  Pulse: 85 (02/04/24 1128)  Resp: 18 (02/04/24 1128)  BP: 96/60 (02/04/24 1128)  SpO2: 95 % (02/04/24 1128) Vital Signs (24h Range):  Temp:  [97.7 °F (36.5 °C)-98.5 °F (36.9 °C)] 98.2 °F (36.8 °C)  Pulse:  [75-94]  "85  Resp:  [15-25] 18  SpO2:  [93 %-98 %] 95 %  BP: ()/(60-92) 96/60     Weight: 115.8 kg (255 lb 4.8 oz)  Height: 5' 3" (160 cm)  Body mass index is 45.22 kg/m².      Intake/Output Summary (Last 24 hours) at 2/4/2024 1136  Last data filed at 2/4/2024 1128  Gross per 24 hour   Intake 900 ml   Output 600 ml   Net 300 ml        General    Nursing note and vitals reviewed.  Constitutional: She is oriented to person, place, and time. She appears well-developed and well-nourished. No distress.   HENT:   Head: Normocephalic and atraumatic.   Eyes: EOM are normal.   Cardiovascular:  Normal rate.            Pulmonary/Chest: Effort normal.   Abdominal: Soft.   Neurological: She is alert and oriented to person, place, and time.   Psychiatric: Her behavior is normal.           Right Knee Exam   Right knee exam is normal.    Left Knee Exam     Inspection   Erythema: present  Swelling: present    Range of Motion   Extension:  0   Flexion:  130     Other   Sensation: normal    Comments:  Left superficial knee hematoma with a medial necrotic area about 3 centimeters x 3 centimeters with full-thickness skin loss and some underlying hematoma noted.  There was also another area slightly superior and lateral to this that is near full-thickness.  See pictures.    Muscle Strength   Left Lower Extremity   Quadriceps:  4/5     Vascular Exam       Left Pulses  Dorsalis Pedis:      2+          Edema  Left Lower Leg: absent        L Knee wound from 2/3/2024                  Significant Labs: CBC:   Recent Labs   Lab 02/03/24  1558 02/04/24  0138   WBC 6.29 6.60   HGB 6.8* 8.1*   HCT 23.7* 27.3*    408     CMP:   Recent Labs   Lab 02/03/24  1558 02/04/24  0138   * 135*   K 4.2 4.1    103   CO2 25 25   GLU 85 103   BUN 22 21   CREATININE 2.0* 2.0*   CALCIUM 8.3* 8.4*   PROT 5.6* 6.3   ALBUMIN 3.4* 3.6   BILITOT 0.4 0.5   ALKPHOS 106 108   AST 10 10   ALT 8* 9*   ANIONGAP 6* 7*     Coagulation: No results for input(s): " ""LABPROT", "INR", "APTT" in the last 48 hours.  CRP: No results for input(s): "CRP" in the last 48 hours.  All pertinent labs within the past 24 hours have been reviewed.    Significant Imaging: X-Ray: I have reviewed all pertinent results/findings and my personal findings are:  X-ray of the left knee is available for review which shows no acute fracture.  Assessment/Plan:     Traumatic hematoma of knee, left, initial encounter  Patient has full-thickness necrosis of the skin medially.  Patient would benefit from surgical I and D with possible wound closure versus wound VAC. make the patient NPO for planned OR tomorrow.        Thank you for your consult. I will follow-up with patient. Please contact us if you have any additional questions.    Bryson Huerta MD  Orthopedics  [facility]        "

## 2024-02-04 NOTE — PROGRESS NOTES
Automatic Inhaler to Nebulizer Interchange    albuterol (Ventolin, ProAir, or Proventil)  mcg given multiple times per day changed to albuterol 2.5 mg Q6H PRN Wheezing  per [facility] Automatic Therapeutic Substitutions Protocol.    Please contact pharmacy at extension [phone] with any questions.     Thank you,   Oni Prieto

## 2024-02-04 NOTE — HOSPITAL COURSE
Left knee hematoma with skin necrosis status post I&D with wound closure and wound VAC application

## 2024-02-04 NOTE — CARE UPDATE
02/04/24 1136   Patient Assessment/Suction   Level of Consciousness (AVPU) alert   Respiratory Effort Normal;Unlabored   All Lung Fields Breath Sounds coarse   PRE-TX-O2   Device (Oxygen Therapy) room air   SpO2 (!) 94 %   Pulse Oximetry Type Intermittent   $ Pulse Oximetry - Multiple Charge Pulse Oximetry - Multiple   Pulse 80   Resp 16   Positioning Sitting in chair   Aerosol Therapy   $ Aerosol Therapy Charges PRN treatment not required   Respiratory Evaluation   $ Care Plan Tech Time 15 min

## 2024-02-04 NOTE — SUBJECTIVE & OBJECTIVE
Past Medical History:   Diagnosis Date    Anemia, unspecified     Arthritis     Asthma     COPD (chronic obstructive pulmonary disease)     Encounter for blood transfusion     Hypertension     Obese body habitus     Wound infection 2019    Right knee       Past Surgical History:   Procedure Laterality Date    ANGIOGRAM, CORONARY, WITH LEFT HEART CATHETERIZATION N/A 2022    Procedure: Angiogram, Coronary, with Left Heart Cath;  Surgeon: Jorge Tran MD;  Location: [facility] CATH/EP LAB;  Service: Cardiology;  Laterality: N/A;     SECTION      ECHOCARDIOGRAM,TRANSESOPHAGEAL N/A 2023    Procedure: Transesophageal echo (MARIANO) intra-procedure log documentation;  Surgeon: Provider, [facility];  Location: [facility] EP LAB;  Service: Cardiology;  Laterality: N/A;    JOINT REPLACEMENT      Knee    KNEE ARTHROPLASTY Right 2019    Procedure: ARTHROPLASTY, KNEE;  Surgeon: Delio Chung MD;  Location: [facility] OR;  Service: Orthopedics;  Laterality: Right;  anesthesia:  general and block    REPLACEMENT OF WOUND VACUUM-ASSISTED CLOSURE DEVICE Right 2019    Procedure: REPLACEMENT, WOUND VAC;  Surgeon: Delio Chung MD;  Location: [facility] OR;  Service: Orthopedics;  Laterality: Right;    TONSILLECTOMY      TREATMENT OF CARDIAC ARRHYTHMIA N/A 2023    Procedure: Cardioversion or Defibrillation;  Surgeon: PJ Betancourt MD;  Location: [facility] EP LAB;  Service: Cardiology;  Laterality: N/A;  AF, MARIANO, DCCV, MAC, EH, 3 Prep    VENTRICULOGRAM, LEFT  2022    Procedure: Ventriculogram, Left;  Surgeon: Jorge Tran MD;  Location: [facility] CATH/EP LAB;  Service: Cardiology;;       Review of patient's allergies indicates:  No Known Allergies    Current Facility-Administered Medications   Medication    0.9%  NaCl infusion (for blood administration)    albuterol nebulizer solution 2.5 mg    amiodarone tablet 200 mg    amLODIPine tablet 2.5 mg    atorvastatin tablet 10 mg    buPROPion TBSR 12 hr tablet 150  mg    ceftaroline (TEFLARO) 400 mg in dextrose 5 % 50 mL IVPB (ready to mix)    cefTRIAXone (ROCEPHIN) 1 g in dextrose 5 % 100 mL IVPB (ready to mix)    cetirizine tablet 10 mg    dextrose 50% injection 12.5 g    dextrose 50% injection 25 g    FLUoxetine capsule 40 mg    gabapentin capsule 300 mg    glucagon (human recombinant) injection 1 mg    glucose chewable tablet 16 g    glucose chewable tablet 24 g    HYDROcodone-acetaminophen 7.5-325 mg per tablet 1 tablet    melatonin tablet 9 mg    metoprolol succinate (TOPROL-XL) 24 hr tablet 25 mg    mupirocin 2 % ointment    naloxone 0.4 mg/mL injection 0.02 mg    ondansetron injection 4 mg    sodium chloride 0.9% flush 10 mL    traZODone tablet 100 mg    vancomycin - pharmacy to dose     Facility-Administered Medications Ordered in Other Encounters   Medication    lidocaine (PF) 10 mg/ml (1%) injection 5 mg     Family History       Problem Relation (Age of Onset)    Alzheimer's disease Mother          Tobacco Use    Smoking status: Every Day     Current packs/day: 0.25     Average packs/day: 0.5 packs/day for 49.1 years (23.5 ttl pk-yrs)     Types: Cigarettes     Start date: 1975     Passive exposure: Current    Smokeless tobacco: Never   Substance and Sexual Activity    Alcohol use: Yes     Comment: RARELY    Drug use: Never    Sexual activity: Not Currently     Partners: Male     Review of Systems   Constitutional: Negative for chills and fever.   HENT:  Negative for congestion.    Eyes:  Negative for blurred vision.   Cardiovascular:  Negative for chest pain and syncope.   Respiratory:  Negative for cough and shortness of breath.    Endocrine: Negative for polyuria.   Hematologic/Lymphatic: Negative for bleeding problem. Does not bruise/bleed easily.   Skin:  Negative for rash.   Musculoskeletal:  Positive for joint pain and joint swelling. Negative for falls, muscle cramps, muscle weakness and myalgias.   Gastrointestinal:  Negative for abdominal pain, nausea and  "vomiting.   Genitourinary:  Negative for flank pain.   Neurological:  Negative for numbness and seizures.   Psychiatric/Behavioral:  Negative for altered mental status.    Allergic/Immunologic: Negative for persistent infections.     Objective:     Vital Signs (Most Recent):  Temp: 98.2 °F (36.8 °C) (02/04/24 1128)  Pulse: 85 (02/04/24 1128)  Resp: 18 (02/04/24 1128)  BP: 96/60 (02/04/24 1128)  SpO2: 95 % (02/04/24 1128) Vital Signs (24h Range):  Temp:  [97.7 °F (36.5 °C)-98.5 °F (36.9 °C)] 98.2 °F (36.8 °C)  Pulse:  [75-94] 85  Resp:  [15-25] 18  SpO2:  [93 %-98 %] 95 %  BP: ()/(60-92) 96/60     Weight: 115.8 kg (255 lb 4.8 oz)  Height: 5' 3" (160 cm)  Body mass index is 45.22 kg/m².      Intake/Output Summary (Last 24 hours) at 2/4/2024 1136  Last data filed at 2/4/2024 1128  Gross per 24 hour   Intake 900 ml   Output 600 ml   Net 300 ml        General    Nursing note and vitals reviewed.  Constitutional: She is oriented to person, place, and time. She appears well-developed and well-nourished. No distress.   HENT:   Head: Normocephalic and atraumatic.   Eyes: EOM are normal.   Cardiovascular:  Normal rate.            Pulmonary/Chest: Effort normal.   Abdominal: Soft.   Neurological: She is alert and oriented to person, place, and time.   Psychiatric: Her behavior is normal.           Right Knee Exam   Right knee exam is normal.    Left Knee Exam     Inspection   Erythema: present  Swelling: present    Range of Motion   Extension:  0   Flexion:  130     Other   Sensation: normal    Comments:  Left superficial knee hematoma with a medial necrotic area about 3 centimeters x 3 centimeters with full-thickness skin loss and some underlying hematoma noted.  There was also another area slightly superior and lateral to this that is near full-thickness.  See pictures.    Muscle Strength   Left Lower Extremity   Quadriceps:  4/5     Vascular Exam       Left Pulses  Dorsalis Pedis:      2+          Edema  Left Lower " "Leg: absent        L Knee wound from 2/3/2024                  Significant Labs: CBC:   Recent Labs   Lab 02/03/24  1558 02/04/24  0138   WBC 6.29 6.60   HGB 6.8* 8.1*   HCT 23.7* 27.3*    408     CMP:   Recent Labs   Lab 02/03/24  1558 02/04/24  0138   * 135*   K 4.2 4.1    103   CO2 25 25   GLU 85 103   BUN 22 21   CREATININE 2.0* 2.0*   CALCIUM 8.3* 8.4*   PROT 5.6* 6.3   ALBUMIN 3.4* 3.6   BILITOT 0.4 0.5   ALKPHOS 106 108   AST 10 10   ALT 8* 9*   ANIONGAP 6* 7*     Coagulation: No results for input(s): "LABPROT", "INR", "APTT" in the last 48 hours.  CRP: No results for input(s): "CRP" in the last 48 hours.  All pertinent labs within the past 24 hours have been reviewed.    Significant Imaging: X-Ray: I have reviewed all pertinent results/findings and my personal findings are:  X-ray of the left knee is available for review which shows no acute fracture.  "

## 2024-02-04 NOTE — PLAN OF CARE
[facility]  Initial Discharge Assessment       Primary Care Provider: Elkin You MD    Admission Diagnosis: Symptomatic anemia [D64.9]    Admission Date: 2/3/2024  Expected Discharge Date: TBD  Met with patient and daughter at bedside to complete assessment. No POA or living will. No HH, HD, Coumadin. See DME. Patient's family will bring her home when she is discharged. Patient's daughter stated that attending MD discussed home health and order for bath bench prior to discharge. CM will continue to follow-up and place referrals as needed.    Transition of Care Barriers: (P) None    Payor: Narrative MGD [facility] / Plan: Narrative CHOICES / Product Type: Medicare Advantage /     Extended Emergency Contact Information  Primary Emergency Contact: Barbara Ribeiro  Work Phone: [phone]  Mobile Phone: [phone]  Relation: Daughter   needed? No    Discharge Plan A: (P) Home Health  Discharge Plan B: (P) Home Health      [id number] - [place], [place] - [address] AT [facility] [address] [facility] [address]  [address]  [facility] [address]  Phone: [phone] Fax: [phone]    [facility]  [address]  [facility] [address]  Phone: [phone] Fax: [phone]    [id number] - [facility] [address]  AT [facility]  [address] [facility] [address]  Phone: [phone] Fax: [phone]      Initial Assessment (most recent)       Adult Discharge Assessment - 02/04/24 1010          Discharge Assessment    Assessment Type Discharge Planning Assessment (P)      Confirmed/corrected address, phone number and insurance Yes (P)      Confirmed Demographics Correct on Facesheet (P)      Source of Information patient;family (P)      When was your last doctors appointment? -- (P)    last month    Communicated VALERIANO with patient/caregiver Date not available/Unable to determine (P)      Reason For  Admission acute blood loss, anemia (P)      People in Home spouse (P)      Facility Arrived From: home (P)      Do you expect to return to your current living situation? Yes (P)      Do you have help at home or someone to help you manage your care at home? Yes (P)      Who are your caregiver(s) and their phone number(s)? Enrico Mueller/spouse, Barbara Ribeiro/daughter (P)      Prior to hospitilization cognitive status: Alert/Oriented (P)      Current cognitive status: Alert/Oriented (P)      Walking or Climbing Stairs ambulation difficulty, requires equipment;stair climbing difficulty, requires equipment (P)      Mobility Management rollator (P)      Dressing/Bathing Difficulty yes (P)      Dressing/Bathing bathing difficulty, requires equipment (P)      Dressing/Bathing Management shower chair (P)      Equipment Currently Used at Home shower chair (P)      Readmission within 30 days? No (P)      Patient currently being followed by outpatient case management? No (P)      Do you currently have service(s) that help you manage your care at home? No (P)      Do you take prescription medications? Yes (P)      Do you have prescription coverage? Yes (P)      Coverage [facility] (P)      Do you have any problems affording any of your prescribed medications? No (P)      Is the patient taking medications as prescribed? yes (P)      Who is going to help you get home at discharge? Enrico Mueller/spouse, Barbara Ribeiro/daughter (P)      How do you get to doctors appointments? family or friend will provide (P)      Are you on dialysis? No (P)      Do you take coumadin? No (P)      Discharge Plan A Home Health (P)      Discharge Plan B Home Health (P)      DME Needed Upon Discharge  bath bench (P)      Discharge Plan discussed with: Patient;Adult children (P)      Transition of Care Barriers None (P)

## 2024-02-04 NOTE — NURSING
Nurses Note -- 4 Eyes      2/4/2024   3:15 AM      Skin assessed during: Admit      [] No Altered Skin Integrity Present    []Prevention Measures Documented      [x] Yes- Altered Skin Integrity Present or Discovered   [x] LDA Added if Not in Epic (Describe Wound)   [] New Altered Skin Integrity was Present on Admit and Documented in LDA   [x] Wound Image Taken    Wound Care Consulted? Yes    Attending Nurse:  [id number]     Second RN/Staff Member: [id number]

## 2024-02-04 NOTE — ASSESSMENT & PLAN NOTE
Patient has full-thickness necrosis of the skin medially.  Patient would benefit from surgical I and D with possible wound closure versus wound VAC. make the patient NPO for planned OR tomorrow.

## 2024-02-04 NOTE — PT/OT/SLP EVAL
Physical Therapy Evaluation    Patient Name:  Iris Mueller   MRN:  [id number]    Recommendations:     Discharge Recommendations: Low Intensity Therapy   Discharge Equipment Recommendations: none   Barriers to discharge: None    Assessment:     Iris Mueller is a 68 y.o. female admitted with a medical diagnosis of Acute blood loss anemia.  She presents with the following impairments/functional limitations: weakness, impaired endurance, impaired sensation, impaired self care skills, impaired functional mobility, gait instability, impaired balance, decreased coordination, decreased upper extremity function, decreased lower extremity function, decreased safety awareness, pain, decreased ROM, impaired skin, edema, impaired joint extensibility     Patient wanting to use toilet and get washed up while sitting on toilet;  bed mobs with SBA, mainly to asst LLE off bed;  she sleeps in recliner/ lift chair so struggled with bed mobs but could do it with extra time.   Transfers SBA RW;  amb x 35' with RW on 2L02 and with Damaso;  asst to toilet and dtr to supv and asst with washing up;  edu to call for nurse when she was ready to walk back to bed.    Rehab Prognosis: Good; patient would benefit from acute skilled PT services to address these deficits and reach maximum level of function.    Recent Surgery: Procedure(s) (LRB):  INCISION AND DRAINAGE, HEMATOMA (Left)      Plan:     During this hospitalization, patient to be seen 6 x/week to address the identified rehab impairments via gait training, therapeutic activities, therapeutic exercises, neuromuscular re-education and progress toward the following goals:    Plan of Care Expires:  03/04/24    Subjective     Chief Complaint: I want to get washed up today and change my clothes.  L knee back and hip hurting at 5-7/10.  Says L foot tingling since fall few days ago.  Patient/Family Comments/goals: return to home  Pain/Comfort:  Pain Rating 1: 8/10  Location 1:   (L knee, back, hip pain)  Pain Addressed 1: Pre-medicate for activity, Distraction, Reposition    Patients cultural, spiritual, Jewish conflicts given the current situation: no    Living Environment:  Lives with , SSH with threshold step;  says mainly a homebody; on occasion will go to [facility] but needs the electric cart;  fell 3x in Dec '23 and 0ver last year about 8x per dtr.  Prior to admission, patients level of function was MOD Indep and occ used a SPC.  Equipment used at home: bedside commode, walker, rolling, shower chair, rollator, cane, straight, grab bar.  DME owned (not currently used): rolling walker, single point cane, bedside commode, shower chair, and transfer tub bench.  Upon discharge, patient will have assistance from family.    Objective:     Communicated with patient and dtr prior to session.  Patient found supine with telemetry, peripheral IV, oxygen  upon PT entry to room.    General Precautions: Standard, fall  Orthopedic Precautions:N/A   Braces: N/A  Respiratory Status: Nasal cannula, flow 2 L/min    Exams:  Cognitive Exam:  Patient is oriented to Person, Place, Time, and Situation  RLE ROM: WFL  RLE Strength: WFL  LLE ROM: WFL except L knee due to wound/ sutures from latest bad fall  LLE Strength: fair    Functional Mobility:  Bed Mobility:     Rolling Left:  independence  Rolling Right: independence  Supine to Sit: stand by assistance  Sit to Supine: stand by assistance  Transfers:     Sit to Stand:  stand by assistance with rolling walker  Gait: x 35' with RW Damaso  Balance: sit st fair+, dyn poor;  stand st RW fair, dyn fair with RW      AM-PAC 6 CLICK MOBILITY  Total Score:16       Treatment & Education:edu on safety/ fall prec;  edu on use call light for asst      Patient left  on toilet for BM and dtr was to asst washing up and changing her gown;  set up everything needed per their request;  nursing aware still on toilet with dtr present and on tank 2L02  with  dtr  present.    GOALS:   Multidisciplinary Problems       Physical Therapy Goals          Problem: Physical Therapy    Goal Priority Disciplines Outcome Goal Variances Interventions   Physical Therapy Goal     PT, PT/OT      Description: Goals to be met by: 24     Patient will increase functional independence with mobility by performin. Supine to sit with Crowley  2. Sit to stand transfer with Modified Crowley  3. Gait  x 75 feet with Modified Crowley using Rolling Walker.                          History:     Past Medical History:   Diagnosis Date    Anemia, unspecified     Arthritis     Asthma     COPD (chronic obstructive pulmonary disease)     Encounter for blood transfusion     Hypertension     Obese body habitus     Wound infection 2019    Right knee       Past Surgical History:   Procedure Laterality Date    ANGIOGRAM, CORONARY, WITH LEFT HEART CATHETERIZATION N/A 2022    Procedure: Angiogram, Coronary, with Left Heart Cath;  Surgeon: Jorge Tran MD;  Location: [facility] CATH/EP LAB;  Service: Cardiology;  Laterality: N/A;     SECTION      ECHOCARDIOGRAM,TRANSESOPHAGEAL N/A 2023    Procedure: Transesophageal echo (MARIANO) intra-procedure log documentation;  Surgeon: Provider, [facility];  Location: [facility] EP LAB;  Service: Cardiology;  Laterality: N/A;    JOINT REPLACEMENT      Knee    KNEE ARTHROPLASTY Right 2019    Procedure: ARTHROPLASTY, KNEE;  Surgeon: Delio Chung MD;  Location: [facility] OR;  Service: Orthopedics;  Laterality: Right;  anesthesia:  general and block    REPLACEMENT OF WOUND VACUUM-ASSISTED CLOSURE DEVICE Right 2019    Procedure: REPLACEMENT, WOUND VAC;  Surgeon: Delio Chung MD;  Location: [facility] OR;  Service: Orthopedics;  Laterality: Right;    TONSILLECTOMY      TREATMENT OF CARDIAC ARRHYTHMIA N/A 2023    Procedure: Cardioversion or Defibrillation;  Surgeon: PJ Betancourt MD;  Location: [facility] EP LAB;  Service: Cardiology;   Laterality: N/A;  AF, MARIANO, DCCV, MAC, EH, 3 Prep    VENTRICULOGRAM, LEFT  12/23/2022    Procedure: Ventriculogram, Left;  Surgeon: Jorge Tran MD;  Location: [facility] CATH/EP LAB;  Service: Cardiology;;       Time Tracking:     PT Received On: 02/04/24  PT Start Time: 1000     PT Stop Time: 1025  PT Total Time (min): 25 min     Billable Minutes: Evaluation 15 and Gait Training 10      02/04/2024

## 2024-02-05 ENCOUNTER — ANESTHESIA (OUTPATIENT)
Dept: SURGERY | Facility: HOSPITAL | Age: 69
DRG: 571 | End: 2024-02-05
Payer: MEDICARE

## 2024-02-05 ENCOUNTER — ANESTHESIA EVENT (OUTPATIENT)
Dept: SURGERY | Facility: HOSPITAL | Age: 69
DRG: 571 | End: 2024-02-05
Payer: MEDICARE

## 2024-02-05 LAB
ALBUMIN SERPL BCP-MCNC: 3.5 G/DL (ref 3.5–5.2)
ALP SERPL-CCNC: 103 U/L (ref 55–135)
ALT SERPL W/O P-5'-P-CCNC: 8 U/L (ref 10–44)
ANION GAP SERPL CALC-SCNC: 6 MMOL/L (ref 8–16)
AST SERPL-CCNC: 8 U/L (ref 10–40)
BASOPHILS # BLD AUTO: 0.03 K/UL (ref 0–0.2)
BASOPHILS NFR BLD: 0.5 % (ref 0–1.9)
BILIRUB SERPL-MCNC: 0.5 MG/DL (ref 0.1–1)
BUN SERPL-MCNC: 24 MG/DL (ref 8–23)
CALCIUM SERPL-MCNC: 8.3 MG/DL (ref 8.7–10.5)
CHLORIDE SERPL-SCNC: 103 MMOL/L (ref 95–110)
CO2 SERPL-SCNC: 25 MMOL/L (ref 23–29)
CREAT SERPL-MCNC: 2.1 MG/DL (ref 0.5–1.4)
DIFFERENTIAL METHOD BLD: ABNORMAL
EOSINOPHIL # BLD AUTO: 0.2 K/UL (ref 0–0.5)
EOSINOPHIL NFR BLD: 2.7 % (ref 0–8)
ERYTHROCYTE [DISTWIDTH] IN BLOOD BY AUTOMATED COUNT: 17.2 % (ref 11.5–14.5)
EST. GFR  (NO RACE VARIABLE): 24.9 ML/MIN/1.73 M^2
GLUCOSE SERPL-MCNC: 90 MG/DL (ref 70–110)
HCT VFR BLD AUTO: 26.5 % (ref 37–48.5)
HGB BLD-MCNC: 7.8 G/DL (ref 12–16)
IMM GRANULOCYTES # BLD AUTO: 0.05 K/UL (ref 0–0.04)
IMM GRANULOCYTES NFR BLD AUTO: 0.8 % (ref 0–0.5)
LYMPHOCYTES # BLD AUTO: 0.7 K/UL (ref 1–4.8)
LYMPHOCYTES NFR BLD: 10.7 % (ref 18–48)
MCH RBC QN AUTO: 26.8 PG (ref 27–31)
MCHC RBC AUTO-ENTMCNC: 29.4 G/DL (ref 32–36)
MCV RBC AUTO: 91 FL (ref 82–98)
MONOCYTES # BLD AUTO: 0.5 K/UL (ref 0.3–1)
MONOCYTES NFR BLD: 7.6 % (ref 4–15)
NEUTROPHILS # BLD AUTO: 5.1 K/UL (ref 1.8–7.7)
NEUTROPHILS NFR BLD: 77.7 % (ref 38–73)
NRBC BLD-RTO: 0 /100 WBC
PLATELET # BLD AUTO: 419 K/UL (ref 150–450)
PMV BLD AUTO: 9.4 FL (ref 9.2–12.9)
POTASSIUM SERPL-SCNC: 4.4 MMOL/L (ref 3.5–5.1)
PROT SERPL-MCNC: 5.8 G/DL (ref 6–8.4)
RBC # BLD AUTO: 2.91 M/UL (ref 4–5.4)
SODIUM SERPL-SCNC: 134 MMOL/L (ref 136–145)
VANCOMYCIN SERPL-MCNC: 14.4 UG/ML
WBC # BLD AUTO: 6.56 K/UL (ref 3.9–12.7)

## 2024-02-05 PROCEDURE — 27000673 HC TUBING BLOOD Y: Performed by: STUDENT IN AN ORGANIZED HEALTH CARE EDUCATION/TRAINING PROGRAM

## 2024-02-05 PROCEDURE — 0JBP0ZZ EXCISION OF LEFT LOWER LEG SUBCUTANEOUS TISSUE AND FASCIA, OPEN APPROACH: ICD-10-PCS | Performed by: ORTHOPAEDIC SURGERY

## 2024-02-05 PROCEDURE — 63600175 PHARM REV CODE 636 W HCPCS: Performed by: NURSE ANESTHETIST, CERTIFIED REGISTERED

## 2024-02-05 PROCEDURE — 36000705 HC OR TIME LEV I EA ADD 15 MIN: Performed by: ORTHOPAEDIC SURGERY

## 2024-02-05 PROCEDURE — 63600175 PHARM REV CODE 636 W HCPCS: Performed by: INTERNAL MEDICINE

## 2024-02-05 PROCEDURE — 25000003 PHARM REV CODE 250: Performed by: INTERNAL MEDICINE

## 2024-02-05 PROCEDURE — 80202 ASSAY OF VANCOMYCIN: CPT | Performed by: INTERNAL MEDICINE

## 2024-02-05 PROCEDURE — 02HV33Z INSERTION OF INFUSION DEVICE INTO SUPERIOR VENA CAVA, PERCUTANEOUS APPROACH: ICD-10-PCS | Performed by: INTERNAL MEDICINE

## 2024-02-05 PROCEDURE — 99221 1ST HOSP IP/OBS SF/LOW 40: CPT | Mod: ,,, | Performed by: FAMILY MEDICINE

## 2024-02-05 PROCEDURE — D9220A PRA ANESTHESIA: Mod: ANES,,, | Performed by: STUDENT IN AN ORGANIZED HEALTH CARE EDUCATION/TRAINING PROGRAM

## 2024-02-05 PROCEDURE — 94640 AIRWAY INHALATION TREATMENT: CPT

## 2024-02-05 PROCEDURE — 36415 COLL VENOUS BLD VENIPUNCTURE: CPT | Performed by: INTERNAL MEDICINE

## 2024-02-05 PROCEDURE — 25000242 PHARM REV CODE 250 ALT 637 W/ HCPCS: Performed by: INTERNAL MEDICINE

## 2024-02-05 PROCEDURE — B548ZZA ULTRASONOGRAPHY OF SUPERIOR VENA CAVA, GUIDANCE: ICD-10-PCS | Performed by: INTERNAL MEDICINE

## 2024-02-05 PROCEDURE — 99900031 HC PATIENT EDUCATION (STAT)

## 2024-02-05 PROCEDURE — 27201107 HC STYLET, STANDARD: Performed by: STUDENT IN AN ORGANIZED HEALTH CARE EDUCATION/TRAINING PROGRAM

## 2024-02-05 PROCEDURE — 25000003 PHARM REV CODE 250: Performed by: ORTHOPAEDIC SURGERY

## 2024-02-05 PROCEDURE — 71000033 HC RECOVERY, INTIAL HOUR: Performed by: ORTHOPAEDIC SURGERY

## 2024-02-05 PROCEDURE — 85025 COMPLETE CBC W/AUTO DIFF WBC: CPT | Performed by: INTERNAL MEDICINE

## 2024-02-05 PROCEDURE — 63600175 PHARM REV CODE 636 W HCPCS: Performed by: STUDENT IN AN ORGANIZED HEALTH CARE EDUCATION/TRAINING PROGRAM

## 2024-02-05 PROCEDURE — 25000003 PHARM REV CODE 250: Performed by: NURSE ANESTHETIST, CERTIFIED REGISTERED

## 2024-02-05 PROCEDURE — 80053 COMPREHEN METABOLIC PANEL: CPT | Performed by: INTERNAL MEDICINE

## 2024-02-05 PROCEDURE — C1751 CATH, INF, PER/CENT/MIDLINE: HCPCS

## 2024-02-05 PROCEDURE — 37000008 HC ANESTHESIA 1ST 15 MINUTES: Performed by: ORTHOPAEDIC SURGERY

## 2024-02-05 PROCEDURE — 27201423 OPTIME MED/SURG SUP & DEVICES STERILE SUPPLY: Performed by: ORTHOPAEDIC SURGERY

## 2024-02-05 PROCEDURE — 94761 N-INVAS EAR/PLS OXIMETRY MLT: CPT

## 2024-02-05 PROCEDURE — 71000039 HC RECOVERY, EACH ADD'L HOUR: Performed by: ORTHOPAEDIC SURGERY

## 2024-02-05 PROCEDURE — 27301 DRAIN THIGH/KNEE LESION: CPT | Mod: LT,,, | Performed by: ORTHOPAEDIC SURGERY

## 2024-02-05 PROCEDURE — 37000009 HC ANESTHESIA EA ADD 15 MINS: Performed by: ORTHOPAEDIC SURGERY

## 2024-02-05 PROCEDURE — 36000704 HC OR TIME LEV I 1ST 15 MIN: Performed by: ORTHOPAEDIC SURGERY

## 2024-02-05 PROCEDURE — 27000671 HC TUBING MICROBORE EXT: Performed by: STUDENT IN AN ORGANIZED HEALTH CARE EDUCATION/TRAINING PROGRAM

## 2024-02-05 PROCEDURE — 21400001 HC TELEMETRY ROOM

## 2024-02-05 PROCEDURE — 25000003 PHARM REV CODE 250: Performed by: STUDENT IN AN ORGANIZED HEALTH CARE EDUCATION/TRAINING PROGRAM

## 2024-02-05 PROCEDURE — 36569 INSJ PICC 5 YR+ W/O IMAGING: CPT

## 2024-02-05 PROCEDURE — D9220A PRA ANESTHESIA: Mod: CRNA,,, | Performed by: NURSE ANESTHETIST, CERTIFIED REGISTERED

## 2024-02-05 RX ORDER — SODIUM CHLORIDE 0.9 % (FLUSH) 0.9 %
10 SYRINGE (ML) INJECTION
Status: DISCONTINUED | OUTPATIENT
Start: 2024-02-05 | End: 2024-02-12 | Stop reason: HOSPADM

## 2024-02-05 RX ORDER — HYDROMORPHONE HYDROCHLORIDE 1 MG/ML
0.2 INJECTION, SOLUTION INTRAMUSCULAR; INTRAVENOUS; SUBCUTANEOUS EVERY 5 MIN PRN
Status: DISCONTINUED | OUTPATIENT
Start: 2024-02-05 | End: 2024-02-12 | Stop reason: HOSPADM

## 2024-02-05 RX ORDER — ONDANSETRON HYDROCHLORIDE 2 MG/ML
INJECTION, SOLUTION INTRAVENOUS
Status: DISCONTINUED | OUTPATIENT
Start: 2024-02-05 | End: 2024-02-05

## 2024-02-05 RX ORDER — PHENYLEPHRINE HYDROCHLORIDE 10 MG/ML
INJECTION INTRAVENOUS
Status: DISCONTINUED | OUTPATIENT
Start: 2024-02-05 | End: 2024-02-05

## 2024-02-05 RX ORDER — PROPOFOL 10 MG/ML
VIAL (ML) INTRAVENOUS
Status: DISCONTINUED | OUTPATIENT
Start: 2024-02-05 | End: 2024-02-05

## 2024-02-05 RX ORDER — ONDANSETRON HYDROCHLORIDE 2 MG/ML
4 INJECTION, SOLUTION INTRAVENOUS DAILY PRN
Status: DISCONTINUED | OUTPATIENT
Start: 2024-02-05 | End: 2024-02-12 | Stop reason: HOSPADM

## 2024-02-05 RX ORDER — ACETAMINOPHEN 10 MG/ML
INJECTION, SOLUTION INTRAVENOUS
Status: DISCONTINUED | OUTPATIENT
Start: 2024-02-05 | End: 2024-02-05

## 2024-02-05 RX ORDER — FAMOTIDINE 10 MG/ML
INJECTION INTRAVENOUS
Status: DISCONTINUED | OUTPATIENT
Start: 2024-02-05 | End: 2024-02-05

## 2024-02-05 RX ORDER — OXYCODONE HYDROCHLORIDE 5 MG/1
5 TABLET ORAL
Status: DISCONTINUED | OUTPATIENT
Start: 2024-02-05 | End: 2024-02-06

## 2024-02-05 RX ORDER — FENTANYL CITRATE 50 UG/ML
INJECTION, SOLUTION INTRAMUSCULAR; INTRAVENOUS
Status: DISCONTINUED | OUTPATIENT
Start: 2024-02-05 | End: 2024-02-05

## 2024-02-05 RX ORDER — SODIUM CHLORIDE, SODIUM LACTATE, POTASSIUM CHLORIDE, CALCIUM CHLORIDE 600; 310; 30; 20 MG/100ML; MG/100ML; MG/100ML; MG/100ML
INJECTION, SOLUTION INTRAVENOUS CONTINUOUS PRN
Status: DISCONTINUED | OUTPATIENT
Start: 2024-02-05 | End: 2024-02-05

## 2024-02-05 RX ORDER — SUCCINYLCHOLINE CHLORIDE 20 MG/ML
INJECTION INTRAMUSCULAR; INTRAVENOUS
Status: DISCONTINUED | OUTPATIENT
Start: 2024-02-05 | End: 2024-02-05

## 2024-02-05 RX ORDER — DIPHENHYDRAMINE HYDROCHLORIDE 50 MG/ML
12.5 INJECTION INTRAMUSCULAR; INTRAVENOUS
Status: DISCONTINUED | OUTPATIENT
Start: 2024-02-05 | End: 2024-02-12 | Stop reason: HOSPADM

## 2024-02-05 RX ORDER — LIDOCAINE HYDROCHLORIDE 10 MG/ML
INJECTION, SOLUTION EPIDURAL; INFILTRATION; INTRACAUDAL; PERINEURAL
Status: DISCONTINUED | OUTPATIENT
Start: 2024-02-05 | End: 2024-02-05

## 2024-02-05 RX ORDER — VANCOMYCIN HCL IN 5 % DEXTROSE 1G/250ML
PLASTIC BAG, INJECTION (ML) INTRAVENOUS
Status: DISCONTINUED | OUTPATIENT
Start: 2024-02-05 | End: 2024-02-05

## 2024-02-05 RX ORDER — ROCURONIUM BROMIDE 10 MG/ML
INJECTION, SOLUTION INTRAVENOUS
Status: DISCONTINUED | OUTPATIENT
Start: 2024-02-05 | End: 2024-02-05

## 2024-02-05 RX ADMIN — HYDROMORPHONE HYDROCHLORIDE 0.2 MG: 1 INJECTION, SOLUTION INTRAMUSCULAR; INTRAVENOUS; SUBCUTANEOUS at 01:02

## 2024-02-05 RX ADMIN — VANCOMYCIN HYDROCHLORIDE 500 MG: 1 INJECTION, POWDER, LYOPHILIZED, FOR SOLUTION INTRAVENOUS at 12:02

## 2024-02-05 RX ADMIN — ROCURONIUM BROMIDE 25 MG: 10 INJECTION, SOLUTION INTRAVENOUS at 12:02

## 2024-02-05 RX ADMIN — ROCURONIUM BROMIDE 5 MG: 10 INJECTION, SOLUTION INTRAVENOUS at 12:02

## 2024-02-05 RX ADMIN — MUPIROCIN 1 G: 20 OINTMENT TOPICAL at 10:02

## 2024-02-05 RX ADMIN — METOPROLOL SUCCINATE 25 MG: 25 TABLET, EXTENDED RELEASE ORAL at 08:02

## 2024-02-05 RX ADMIN — GABAPENTIN 300 MG: 300 CAPSULE ORAL at 08:02

## 2024-02-05 RX ADMIN — CETIRIZINE HYDROCHLORIDE 10 MG: 10 TABLET, FILM COATED ORAL at 10:02

## 2024-02-05 RX ADMIN — FAMOTIDINE 20 MG: 10 INJECTION, SOLUTION INTRAVENOUS at 12:02

## 2024-02-05 RX ADMIN — SUGAMMADEX 200 MG: 100 INJECTION, SOLUTION INTRAVENOUS at 12:02

## 2024-02-05 RX ADMIN — GABAPENTIN 300 MG: 300 CAPSULE ORAL at 10:02

## 2024-02-05 RX ADMIN — AMLODIPINE BESYLATE 2.5 MG: 2.5 TABLET ORAL at 08:02

## 2024-02-05 RX ADMIN — ALBUTEROL SULFATE 2.5 MG: 2.5 SOLUTION RESPIRATORY (INHALATION) at 08:02

## 2024-02-05 RX ADMIN — PHENYLEPHRINE HYDROCHLORIDE 100 MCG: 10 INJECTION INTRAVENOUS at 12:02

## 2024-02-05 RX ADMIN — HYDROCODONE BITARTRATE AND ACETAMINOPHEN 1 TABLET: 7.5; 325 TABLET ORAL at 10:02

## 2024-02-05 RX ADMIN — ALBUTEROL SULFATE 2.5 MG: 2.5 SOLUTION RESPIRATORY (INHALATION) at 01:02

## 2024-02-05 RX ADMIN — PROPOFOL 100 MG: 10 INJECTION, EMULSION INTRAVENOUS at 12:02

## 2024-02-05 RX ADMIN — BUPROPION HYDROCHLORIDE 150 MG: 150 TABLET, EXTENDED RELEASE ORAL at 10:02

## 2024-02-05 RX ADMIN — LIDOCAINE HYDROCHLORIDE 50 MG: 10 INJECTION, SOLUTION EPIDURAL; INFILTRATION; INTRACAUDAL; PERINEURAL at 12:02

## 2024-02-05 RX ADMIN — ONDANSETRON 4 MG: 2 INJECTION INTRAMUSCULAR; INTRAVENOUS at 12:02

## 2024-02-05 RX ADMIN — ACETAMINOPHEN 500 MG: 10 INJECTION, SOLUTION INTRAVENOUS at 12:02

## 2024-02-05 RX ADMIN — Medication 140 MG: at 12:02

## 2024-02-05 RX ADMIN — ATORVASTATIN CALCIUM 10 MG: 10 TABLET, FILM COATED ORAL at 08:02

## 2024-02-05 RX ADMIN — FENTANYL CITRATE 50 MCG: 50 INJECTION, SOLUTION INTRAMUSCULAR; INTRAVENOUS at 12:02

## 2024-02-05 RX ADMIN — CEFTRIAXONE SODIUM 1 G: 1 INJECTION, POWDER, FOR SOLUTION INTRAMUSCULAR; INTRAVENOUS at 07:02

## 2024-02-05 RX ADMIN — OXYCODONE HYDROCHLORIDE 5 MG: 5 TABLET ORAL at 01:02

## 2024-02-05 RX ADMIN — FLUOXETINE HYDROCHLORIDE 40 MG: 20 CAPSULE ORAL at 10:02

## 2024-02-05 RX ADMIN — AMIODARONE HYDROCHLORIDE 200 MG: 200 TABLET ORAL at 10:02

## 2024-02-05 RX ADMIN — OXYCODONE HYDROCHLORIDE 5 MG: 5 TABLET ORAL at 08:02

## 2024-02-05 RX ADMIN — METOPROLOL SUCCINATE 25 MG: 25 TABLET, EXTENDED RELEASE ORAL at 10:02

## 2024-02-05 RX ADMIN — SODIUM CHLORIDE, SODIUM LACTATE, POTASSIUM CHLORIDE, AND CALCIUM CHLORIDE: .6; .31; .03; .02 INJECTION, SOLUTION INTRAVENOUS at 12:02

## 2024-02-05 RX ADMIN — MUPIROCIN 1 G: 20 OINTMENT TOPICAL at 08:02

## 2024-02-05 RX ADMIN — BUPROPION HYDROCHLORIDE 150 MG: 150 TABLET, EXTENDED RELEASE ORAL at 08:02

## 2024-02-05 NOTE — PLAN OF CARE
Problem: Adult Inpatient Plan of Care  Goal: Plan of Care Review  Outcome: Ongoing, Progressing  Goal: Patient-Specific Goal (Individualized)  Outcome: Ongoing, Progressing  Goal: Absence of Hospital-Acquired Illness or Injury  Outcome: Ongoing, Progressing  Goal: Optimal Comfort and Wellbeing  Outcome: Ongoing, Progressing  Goal: Readiness for Transition of Care  Outcome: Ongoing, Progressing     Problem: Bariatric Environmental Safety  Goal: Safety Maintained with Care  Outcome: Ongoing, Progressing     Problem: Fall Injury Risk  Goal: Absence of Fall and Fall-Related Injury  Outcome: Ongoing, Progressing     Problem: Skin Injury Risk Increased  Goal: Skin Health and Integrity  Outcome: Ongoing, Progressing     Problem: Impaired Wound Healing  Goal: Optimal Wound Healing  Outcome: Ongoing, Progressing     Problem: Infection  Goal: Absence of Infection Signs and Symptoms  Outcome: Ongoing, Progressing

## 2024-02-05 NOTE — PLAN OF CARE
Problem: Adult Inpatient Plan of Care  Goal: Plan of Care Review  Outcome: Ongoing, Progressing  Goal: Patient-Specific Goal (Individualized)  Outcome: Ongoing, Progressing  Goal: Absence of Hospital-Acquired Illness or Injury  Outcome: Ongoing, Progressing  Goal: Optimal Comfort and Wellbeing  Outcome: Ongoing, Progressing  Goal: Readiness for Transition of Care  Outcome: Ongoing, Progressing     Problem: Bariatric Environmental Safety  Goal: Safety Maintained with Care  Outcome: Ongoing, Progressing     Problem: Fall Injury Risk  Goal: Absence of Fall and Fall-Related Injury  Outcome: Ongoing, Progressing     Problem: Skin Injury Risk Increased  Goal: Skin Health and Integrity  Outcome: Ongoing, Progressing     Problem: Impaired Wound Healing  Goal: Optimal Wound Healing  Outcome: Ongoing, Progressing

## 2024-02-05 NOTE — PROGRESS NOTES
Pharmacokinetic Assessment Follow Up: IV Vancomycin    Vancomycin serum concentration assessment(s):    The random level was drawn correctly and can be used to guide therapy at this time. The measurement is within the desired definitive target range of 10 to 15 mcg/mL.    Vancomycin Regimen Plan:    Give Vancomycin 500 mg once and Re-dose when the random level is less than 15 mcg/mL, next level to be drawn at 0800 on 02/06    Drug levels (last 3 results):  Recent Labs   Lab Result Units 02/05/24  0441   Vancomycin, Random ug/mL 14.4       Pharmacy will continue to follow and monitor vancomycin.    Please contact pharmacy at extension [phone] for questions regarding this assessment.    Thank you for the consult,   Oni Prieto       Patient brief summary:  Iris Mueller is a 68 y.o. female initiated on antimicrobial therapy with IV Vancomycin for treatment of skin & soft tissue infection    Drug Allergies:   Review of patient's allergies indicates:  No Known Allergies    Actual Body Weight:   115.8 kg    Renal Function:   Estimated Creatinine Clearance: 30.6 mL/min (A) (based on SCr of 2.1 mg/dL (H)).,     CBC (last 72 hours):  Recent Labs   Lab Result Units 02/03/24  1558 02/04/24  0138 02/05/24  0442   WBC K/uL 6.29 6.60 6.56   Hemoglobin g/dL 6.8* 8.1* 7.8*   Hematocrit % 23.7* 27.3* 26.5*   Platelets K/uL 387 408 419   Gran % % 78.5* 84.5* 77.7*   Lymph % % 9.1* 8.2* 10.7*   Mono % % 8.6 4.4 7.6   Eosinophil % % 2.5 2.0 2.7   Basophil % % 0.3 0.3 0.5   Differential Method  Automated Automated Automated       Metabolic Panel (last 72 hours):  Recent Labs   Lab Result Units 02/03/24  1558 02/04/24  0138 02/05/24  0441   Sodium mmol/L 135* 135* 134*   Potassium mmol/L 4.2 4.1 4.4   Chloride mmol/L 104 103 103   CO2 mmol/L 25 25 25   Glucose mg/dL 85 103 90   BUN mg/dL 22 21 24*   Creatinine mg/dL 2.0* 2.0* 2.1*   Albumin g/dL 3.4* 3.6 3.5   Total Bilirubin mg/dL 0.4 0.5 0.5   Alkaline Phosphatase U/L 106 108  103   AST U/L 10 10 8*   ALT U/L 8* 9* 8*   Magnesium mg/dL 2.0  2.0  --   --        Vancomycin Administrations:  vancomycin given in the last 96 hours                     vancomycin in dextrose 5 % 1 gram/250 mL IVPB 2,000 mg (mg) 2,000 mg New Bag 02/04/24 0152                    Microbiologic Results:  Microbiology Results (last 7 days)       Procedure Component Value Units Date/Time    Blood culture #1 **CANNOT BE ORDERED STAT** [[id number]] Collected: 02/04/24 0129    Order Status: Completed Specimen: Blood Updated: 02/05/24 0432     Blood Culture, Routine No Growth to date      No Growth to date    Narrative:      Collection has been rescheduled by AOC at 02/03/2024 23:03 Reason:   Patient unavailable pt is in MRI  Collection has been rescheduled by AOC at 02/03/2024 23:03 Reason:   Patient unavailable pt is in MRI    Blood culture #2 **CANNOT BE ORDERED STAT** [[id number]] Collected: 02/04/24 0138    Order Status: Completed Specimen: Blood Updated: 02/05/24 0432     Blood Culture, Routine No Growth to date      No Growth to date    Narrative:      Collection has been rescheduled by AOC at 02/03/2024 23:03 Reason:   Patient unavailable pt is in MRI  Collection has been rescheduled by AOC at 02/03/2024 23:03 Reason:   Patient unavailable pt is in MRI    Blood culture #1 **CANNOT BE ORDERED STAT** [[id number]]     Order Status: Canceled Specimen: Blood     Blood culture #2 **CANNOT BE ORDERED STAT** [[id number]]     Order Status: Canceled Specimen: Blood

## 2024-02-05 NOTE — PT/OT/SLP PROGRESS
Physical Therapy      Patient Name:  Iris Mueller   MRN:  [id number]    Patient not seen today secondary to  . Will follow-up tomorrow if ordered after washout of L knee ;  patient was sleeping and dtr did not want her disturbed.

## 2024-02-05 NOTE — PT/OT/SLP PROGRESS
Occupational Therapy      Patient Name:  Iris Mueller   MRN:  [id number]    Patient not seen today secondary to Other (Comment) (Pt having I&D with 24 hr hold.). Will follow-up next service date.    2/5/2024

## 2024-02-05 NOTE — NURSING
Pt returned to room from surgery. O2 Sat at 90% on 2L/min NC. Pt switched to Oxymask due to mouth breathing and O2 Sat's 96%. Pt still sleepy from procedure. Family at bedside call light in reach.

## 2024-02-05 NOTE — SUBJECTIVE & OBJECTIVE
Interval History: Ms Mueller has no complaints of pain. Her left knee is less painful and she still has difficulty with mobility  Debridement of the left knee this date by Dr Huerta    Review of Systems   Constitutional:  Positive for activity change, diaphoresis and fatigue.   HENT: Negative.     Eyes: Negative.    Respiratory: Negative.     Cardiovascular: Negative.    Gastrointestinal: Negative.    Endocrine: Positive for cold intolerance.   Genitourinary: Negative.    Musculoskeletal:  Positive for arthralgias, gait problem and myalgias.   Skin:  Positive for wound.   Allergic/Immunologic: Negative.    Neurological:  Positive for weakness.   Hematological:  Bruises/bleeds easily.   Psychiatric/Behavioral:  The patient is nervous/anxious.      Objective:     Vital Signs (Most Recent):  Temp: 98 °F (36.7 °C) (02/05/24 1330)  Pulse: 75 (02/05/24 1415)  Resp: 19 (02/05/24 1415)  BP: 119/79 (02/05/24 1415)  SpO2: 95 % (02/05/24 1415) Vital Signs (24h Range):  Temp:  [97.7 °F (36.5 °C)-98.1 °F (36.7 °C)] 98 °F (36.7 °C)  Pulse:  [74-94] 75  Resp:  [15-23] 19  SpO2:  [87 %-100 %] 95 %  BP: (113-171)/(68-94) 119/79     Weight: 115.8 kg (255 lb 4.8 oz)  Body mass index is 45.22 kg/m².    Intake/Output Summary (Last 24 hours) at 2/5/2024 1426  Last data filed at 2/5/2024 1418  Gross per 24 hour   Intake 1305 ml   Output 500 ml   Net 805 ml         Physical Exam  Vitals and nursing note reviewed.   Constitutional:       General: She is not in acute distress.     Appearance: She is not ill-appearing.   HENT:      Head: Normocephalic and atraumatic.      Nose: Nose normal.      Mouth/Throat:      Mouth: Mucous membranes are moist.   Eyes:      Extraocular Movements: Extraocular movements intact.      Pupils: Pupils are equal, round, and reactive to light.   Cardiovascular:      Rate and Rhythm: Normal rate and regular rhythm.   Pulmonary:      Effort: Pulmonary effort is normal.      Breath sounds: Normal breath sounds.    Abdominal:      General: Bowel sounds are normal.   Musculoskeletal:      Cervical back: Normal range of motion and neck supple.      Comments: Limited ROM left knee   Skin:     General: Skin is warm.   Neurological:      Mental Status: She is alert and oriented to person, place, and time.   Psychiatric:      Comments: Mildly dulled affect             Significant Labs: All pertinent labs within the past 24 hours have been reviewed.  Recent Lab Results         02/05/24  0442   02/05/24  0441        Albumin   3.5       ALP   103       ALT   8       Anion Gap   6       AST   8       Baso # 0.03         Basophil % 0.5         BILIRUBIN TOTAL   0.5  Comment: For infants and newborns, interpretation of results should be based  on gestational age, weight and in agreement with clinical  observations.    Premature Infant recommended reference ranges:  Up to 24 hours.............<8.0 mg/dL  Up to 48 hours............<12.0 mg/dL  3-5 days..................<15.0 mg/dL  6-29 days.................<15.0 mg/dL         BUN   24       Calcium   8.3       Chloride   103       CO2   25       Creatinine   2.1       Differential Method Automated         eGFR   24.9       Eos # 0.2         Eos % 2.7         Glucose   90       Gran # (ANC) 5.1         Gran % 77.7         Hematocrit 26.5         Hemoglobin 7.8         Immature Grans (Abs) 0.05  Comment: Mild elevation in immature granulocytes is non specific and   can be seen in a variety of conditions including stress response,   acute inflammation, trauma and pregnancy. Correlation with other   laboratory and clinical findings is essential.           Immature Granulocytes 0.8         Lymph # 0.7         Lymph % 10.7         MCH 26.8         MCHC 29.4         MCV 91         Mono # 0.5         Mono % 7.6         MPV 9.4         nRBC 0         Platelet Count 419         Potassium   4.4       PROTEIN TOTAL   5.8       RBC 2.91         RDW 17.2         Sodium   134       Vancomycin, Random    14.4       WBC 6.56                 Significant Imaging: I have reviewed all pertinent imaging results/findings within the past 24 hours.

## 2024-02-05 NOTE — PLAN OF CARE
KINGA met with Pt and Pt family at bedside to discuss Home health. Pt is open to home health patient choice signed. KINGA sent referrals via [employer].      02/05/24 1316   Post-Acute Status   Post-Acute Authorization Home Health   Home Health Status Referrals Sent   Coverage Payor: [employer] [facility] -   Discharge Delays None known at this time   Discharge Plan   Discharge Plan A Home Health   Discharge Plan B Home Health

## 2024-02-05 NOTE — ANESTHESIA PROCEDURE NOTES
Intubation    Date/Time: 2/5/2024 12:18 PM    Performed by: Elvis Link CRNA  Authorized by: Bryon Thomson MD    Intubation:     Induction:  Intravenous    Intubated:  Postinduction    Mask Ventilation:  Easy mask    Attempts:  2    Attempted By:  CRNA    Method of Intubation:  Direct    Blade:  Modi 2    Laryngeal View Grade: Grade IIA - cords partially seen      Attempted By (2nd Attempt):  Staff anesthesiologist    Method of Intubation (2nd Attempt):  Direct    Blade (2nd Attempt):  Robertson 3    Laryngeal View Grade (2nd Attempt): Grade I - full view of cords      Difficult Airway Encountered?: No      Complications:  None    Airway Device:  Oral endotracheal tube    Airway Device Size:  7.5    Style/Cuff Inflation:  Cuffed    Inflation Amount (mL):  5    Tube secured:  22    Secured at:  The lips    Placement Verified By:  Capnometry    Complicating Factors:  None    Findings Post-Intubation:  BS equal bilateral and atraumatic/condition of teeth unchanged

## 2024-02-05 NOTE — OP NOTE
[facility]  Orthopedic Surgery  Operative Note    SUMMARY     Date of Procedure: 2/5/2024     Procedure: Procedure(s) (LRB):  INCISION AND DRAINAGE, HEMATOMA (Left)     Application of negative pressure wound VAC.  Wound size 6 centimeters long by 6 centimeters wide.    Surgeon(s) and Role:     * Bryson Huerta MD - Primary    Assistant: Randy KOHLI    Pre-Operative Diagnosis: Traumatic hematoma of knee, left, initial encounter [S80.02XA]    Post-Operative Diagnosis: Post-Op Diagnosis Codes:     * Traumatic hematoma of knee, left, initial encounter [S80.02XA]    Anesthesia: Choice    Findings:  Patient had a large amount of residual hematoma.  Skin was able to be closed once hematoma was removed although after necrotic skin was debrided with 15 blade, the patient had a complex stellate laceration that measured about 6 centimeter x 6 centimeters.  It was full-thickness through skin and did not involve any tissue deeper than the subcutaneous tissue.    Complications: No    Estimated Blood Loss (EBL): * No values recorded between 2/5/2024 12:31 PM and 2/5/2024 12:53 PM *      Implants: * No implants in log *    Specimens:   Specimen (24h ago, onward)      None                    Condition: Good    Disposition: PACU - hemodynamically stable.    Attestation: I was present and scrubbed for the entire procedure.      PROCEDURE IN DETAIL: Risks, benefits and alternatives of the procedure were   explained to the patient including, but not limited to damage to nerves,   arteries, blood vessels. Also explained risk of infection, possible wound VAC placement or grafting of the skin, DVT, PE, continued pain due to arthritis,  as well as   anesthetic complications including seizure, stroke, heart attack and death. They  understood this and signed informed consent. The patient's Left knee was marked prior to coming to the Operating Room. Once there a formal   timeout was done in which correct patient,  procedure and op site were all   correctly identified and confirmed by the entire operating team.  Appropriate preoperative antibiotic was   given prior to surgical incision. Laryngeal mask anesthesia was induced. The   patient's Left lower extremity was prepped and draped in normal sterile fashion.  We started off with the excision of the necrotic skin.  Fifteen blade was used to remove any black eschar.  We then just applied a lot of pressure and were able to remove a large amount of hematoma.  Blunt dissection was performed using my finger to make sure that was no residual pockets a tissue.  Once the hematoma was fully evacuated, we then copiously irrigated out the wound with 1/2 liters of normal saline.  We then proceeded with the closure of the complex wound, 2-0 nylon in a combination of simple and interrupted stitches was used to close the wound to the best of our ability.  Patient's skin was very tenuous and poor quality.  We decided to put an excisional wound VAC on top of the wound to help pull tension and hopefully aid in healing.  Prevena was placed on top of the incision and had good seal.       They were then extubated and awakened and transferred   from the Operating Room to the Recovery Room in stable condition.     Postop course is for an I and D of a hematoma

## 2024-02-05 NOTE — NURSING
Pt's IV infiltrated. Dr. Damon notified and ordered PICC Line placed as soon as possible. Notified surgery of this as well since she is due for procedure this morning. Order placed for PICC Line.

## 2024-02-05 NOTE — TRANSFER OF CARE
"Anesthesia Transfer of Care Note    Patient: Iris Mueller    Procedure(s) Performed: Procedure(s) (LRB):  INCISION AND DRAINAGE, HEMATOMA (Left)    Patient location: PACU    Anesthesia Type: general    Transport from OR: Transported from OR on room air with adequate spontaneous ventilation    Post pain: adequate analgesia    Post assessment: no apparent anesthetic complications    Post vital signs: stable    Level of consciousness: awake    Nausea/Vomiting: no nausea/vomiting    Complications: none    Transfer of care protocol was followed      Last vitals: Visit Vitals  BP (!) 141/80   Pulse 89   Temp 36.7 °C (98.1 °F) (Oral)   Resp 18   Ht 5' 3" (1.6 m)   Wt 115.8 kg (255 lb 4.8 oz)   SpO2 95%   Breastfeeding No   BMI 45.22 kg/m²     "

## 2024-02-05 NOTE — RESPIRATORY THERAPY
02/04/24 1940   Patient Assessment/Suction   Level of Consciousness (AVPU) alert   Respiratory Effort Normal;Unlabored   Expansion/Accessory Muscles/Retractions no retractions;no use of accessory muscles   All Lung Fields Breath Sounds diminished;coarse   PRE-TX-O2   Device (Oxygen Therapy) room air   SpO2 96 %   Pulse Oximetry Type Intermittent   $ Pulse Oximetry - Multiple Charge Pulse Oximetry - Multiple   Pulse 84   Resp 18   Aerosol Therapy   $ Aerosol Therapy Charges PRN treatment not required   Respiratory Treatment Status (SVN) PRN treatment not required   Education   $ Education Bronchodilator;15 min

## 2024-02-05 NOTE — PROGRESS NOTES
[facility]  Progress Note    Patient Name: Iris Mueller  MRN: [id number]  Patient Class: IP- Inpatient   Admission Date: 2/3/2024  Length of Stay: 2 days  Attending Physician: Ramiro Damon MD  Primary Care Provider: Elkin You MD        Subjective:     Principal Problem:Acute blood loss anemia        HPI:  No notes on file    Overview/Hospital Course:  2/4/2024  Ms Mueller has difficulty with left knee pain  6/10 and difficulty walking due to left knee pain /dysfunction  She has no other complaints    2/5/2024  Ms Mueller is is somnolent this AM without any new complaints  She is anxious to have her left knee surgery      Interval History: Ms Mueller has no complaints of pain. Her left knee is less painful and she still has difficulty with mobility  Debridement of the left knee this date by Dr Huerta    Review of Systems   Constitutional:  Positive for activity change, diaphoresis and fatigue.   HENT: Negative.     Eyes: Negative.    Respiratory: Negative.     Cardiovascular: Negative.    Gastrointestinal: Negative.    Endocrine: Positive for cold intolerance.   Genitourinary: Negative.    Musculoskeletal:  Positive for arthralgias, gait problem and myalgias.   Skin:  Positive for wound.   Allergic/Immunologic: Negative.    Neurological:  Positive for weakness.   Hematological:  Bruises/bleeds easily.   Psychiatric/Behavioral:  The patient is nervous/anxious.      Objective:     Vital Signs (Most Recent):  Temp: 98 °F (36.7 °C) (02/05/24 1330)  Pulse: 75 (02/05/24 1415)  Resp: 19 (02/05/24 1415)  BP: 119/79 (02/05/24 1415)  SpO2: 95 % (02/05/24 1415) Vital Signs (24h Range):  Temp:  [97.7 °F (36.5 °C)-98.1 °F (36.7 °C)] 98 °F (36.7 °C)  Pulse:  [74-94] 75  Resp:  [15-23] 19  SpO2:  [87 %-100 %] 95 %  BP: (113-171)/(68-94) 119/79     Weight: 115.8 kg (255 lb 4.8 oz)  Body mass index is 45.22 kg/m².    Intake/Output Summary (Last 24 hours) at 2/5/2024  1426  Last data filed at 2/5/2024 1418  Gross per 24 hour   Intake 1305 ml   Output 500 ml   Net 805 ml         Physical Exam  Vitals and nursing note reviewed.   Constitutional:       General: She is not in acute distress.     Appearance: She is not ill-appearing.   HENT:      Head: Normocephalic and atraumatic.      Nose: Nose normal.      Mouth/Throat:      Mouth: Mucous membranes are moist.   Eyes:      Extraocular Movements: Extraocular movements intact.      Pupils: Pupils are equal, round, and reactive to light.   Cardiovascular:      Rate and Rhythm: Normal rate and regular rhythm.   Pulmonary:      Effort: Pulmonary effort is normal.      Breath sounds: Normal breath sounds.   Abdominal:      General: Bowel sounds are normal.   Musculoskeletal:      Cervical back: Normal range of motion and neck supple.      Comments: Limited ROM left knee   Skin:     General: Skin is warm.   Neurological:      Mental Status: She is alert and oriented to person, place, and time.   Psychiatric:      Comments: Mildly dulled affect             Significant Labs: All pertinent labs within the past 24 hours have been reviewed.  Recent Lab Results         02/05/24  0442   02/05/24  0441        Albumin   3.5       ALP   103       ALT   8       Anion Gap   6       AST   8       Baso # 0.03         Basophil % 0.5         BILIRUBIN TOTAL   0.5  Comment: For infants and newborns, interpretation of results should be based  on gestational age, weight and in agreement with clinical  observations.    Premature Infant recommended reference ranges:  Up to 24 hours.............<8.0 mg/dL  Up to 48 hours............<12.0 mg/dL  3-5 days..................<15.0 mg/dL  6-29 days.................<15.0 mg/dL         BUN   24       Calcium   8.3       Chloride   103       CO2   25       Creatinine   2.1       Differential Method Automated         eGFR   24.9       Eos # 0.2         Eos % 2.7         Glucose   90       Gran # (ANC) 5.1         Gran %  77.7         Hematocrit 26.5         Hemoglobin 7.8         Immature Grans (Abs) 0.05  Comment: Mild elevation in immature granulocytes is non specific and   can be seen in a variety of conditions including stress response,   acute inflammation, trauma and pregnancy. Correlation with other   laboratory and clinical findings is essential.           Immature Granulocytes 0.8         Lymph # 0.7         Lymph % 10.7         MCH 26.8         MCHC 29.4         MCV 91         Mono # 0.5         Mono % 7.6         MPV 9.4         nRBC 0         Platelet Count 419         Potassium   4.4       PROTEIN TOTAL   5.8       RBC 2.91         RDW 17.2         Sodium   134       Vancomycin, Random   14.4       WBC 6.56                 Significant Imaging: I have reviewed all pertinent imaging results/findings within the past 24 hours.    Assessment/Plan:      Traumatic hematoma of knee, left, initial encounter  Pt has a full thickness necrotic area left knee which Dr Marie will debride tomorrow  Continue ceftriaxone and vancomycin        VTE Risk Mitigation (From admission, onward)           Ordered     IP VTE HIGH RISK PATIENT  Once         02/03/24 1935                    Discharge Planning   VALERIANO: 2/6/2024     Code Status: Full Code   Is the patient medically ready for discharge?:     Reason for patient still in hospital (select all that apply): Patient new problem, Treatment, and Consult recommendations  Discharge Plan A: Home Health   Discharge Delays: None known at this time              Ramiro Damon MD  Department of Hospital Medicine   [facility]

## 2024-02-05 NOTE — ANESTHESIA PREPROCEDURE EVALUATION
2024  Iris Mueller is a 68 y.o., female.    Patient Active Problem List   Diagnosis    Mild asthma without complication    Essential hypertension    Obesity, Class III, BMI 40-49.9 (morbid obesity)    Fibromyalgia    Hyperkalemia    Non-seasonal allergic rhinitis    Acute blood loss anemia    Normocytic anemia    Chronic kidney disease    Hyponatremia    Hyperglycemia    Hypertension    Asthma    Anemia    Morbid obesity    Artificial knee joint present, right    Dyslipidemia    Chronic obstructive pulmonary disease    Age-related osteoporosis without current pathological fracture    Mild episode of recurrent major depressive disorder    Primary insomnia    Elevated troponin    Diverticulitis    Gallbladder disorder    HFrEF (heart failure with reduced ejection fraction)    Persistent atrial fibrillation    Nonrheumatic aortic valve stenosis with regurgitation    Nonischemic cardiomyopathy    Mixed hyperlipidemia    Moderate aortic stenosis    Moderate aortic regurgitation    Pulmonary hypertension    Chronic combined systolic and diastolic heart failure    Coronary artery disease involving native coronary artery of native heart without angina pectoris    Osteoarthritis of multiple joints    Traumatic hematoma of knee, left, initial encounter       Past Surgical History:   Procedure Laterality Date    ANGIOGRAM, CORONARY, WITH LEFT HEART CATHETERIZATION N/A 2022    Procedure: Angiogram, Coronary, with Left Heart Cath;  Surgeon: Jorge Tran MD;  Location: [facility] CATH/EP LAB;  Service: Cardiology;  Laterality: N/A;     SECTION      ECHOCARDIOGRAM,TRANSESOPHAGEAL N/A 2023    Procedure: Transesophageal echo (MARIANO) intra-procedure log documentation;  Surgeon: Provider, Araceli Diagnostic;  Location: [facility] EP LAB;  Service: Cardiology;  Laterality: N/A;    JOINT REPLACEMENT       Knee    KNEE ARTHROPLASTY Right 8/14/2019    Procedure: ARTHROPLASTY, KNEE;  Surgeon: Delio Chung MD;  Location: [facility] OR;  Service: Orthopedics;  Laterality: Right;  anesthesia:  general and block    REPLACEMENT OF WOUND VACUUM-ASSISTED CLOSURE DEVICE Right 9/12/2019    Procedure: REPLACEMENT, WOUND VAC;  Surgeon: Delio Chung MD;  Location: [facility] OR;  Service: Orthopedics;  Laterality: Right;    TONSILLECTOMY      TREATMENT OF CARDIAC ARRHYTHMIA N/A 8/31/2023    Procedure: Cardioversion or Defibrillation;  Surgeon: PJ Betancourt MD;  Location: [facility] EP LAB;  Service: Cardiology;  Laterality: N/A;  AF, MARIANO, DCCV, MAC, EH, 3 Prep    VENTRICULOGRAM, LEFT  12/23/2022    Procedure: Ventriculogram, Left;  Surgeon: Jorge Tran MD;  Location: [facility] CATH/EP LAB;  Service: Cardiology;;        Tobacco Use:  The patient  reports that she has been smoking cigarettes. She started smoking about 49 years ago. She has a 23.5 pack-year smoking history. She has been exposed to tobacco smoke. She has never used smokeless tobacco.     Results for orders placed or performed during the hospital encounter of 02/03/24   EKG 12-lead    Collection Time: 02/03/24  3:49 PM    Narrative    Test Reason : D64.9,    Vent. Rate : 081 BPM     Atrial Rate : 061 BPM     P-R Int : 000 ms          QRS Dur : 090 ms      QT Int : 414 ms       P-R-T Axes : 000 059 061 degrees     QTc Int : 480 ms    Atrial fibrillation  Septal infarct ,age undetermined  Abnormal ECG  When compared with ECG of 30-NOV-2023 15:03,  Non-specific change in ST segment in Inferior leads  T wave inversion no longer evident in Inferior leads  Confirmed by Marco LEE, Oli REID ([id number]) on 2/4/2024 10:56:12 AM    Referred By: [email or web address]   SELF           Confirmed By:Oli Lara MD        Imaging Results              US Lower Extremity Arteries Left (Final result)  Result time 02/04/24 01:25:23      Final result by Roselyn Espino DO (02/04/24 01:25:23)                    Narrative:    EXAM:  US Duplex Left Lower Extremity Arteries    CLINICAL HISTORY:  LLE numbness.    TECHNIQUE:  Real-time duplex ultrasound scan of the left lower extremity arteries integrating B-mode two-dimensional vascular structure, Doppler spectral analysis and color flow Doppler imaging.    COMPARISON:  No relevant prior studies available.    FINDINGS:  Left common femoral artery:  Peak systolic velocity in the left common femoral artery is 106 cm/s.  Monophasic waveform.  Left deep femoral artery:  Peak systolic velocity in the left deep femoral artery is 38 cm/s.  Monophasic waveform.  Left superficial femoral artery:  Peak systolic velocity in the left distal superficial femoral artery is 57 cm/s.  Monophasic waveform.  Peak systolic velocity in the left proximal superficial femoral artery is 77 cm/s.  Peak systolic velocity in the left mid superficial femoral artery is 37 cm/s.  Left popliteal artery:  Peak systolic velocity in the left popliteal artery is 17 cm/s.  Monophasic waveform.  Left calf/foot arteries:  Peak systolic velocity in the left dorsalis pedis artery is 15 cm/s.  Monophasic waveform.  Peak systolic velocity in the left mid posterior tibial artery is 46 cm/s.  Peak systolic velocity in the left mid peroneal artery is 33 cm/s.  Peak systolic velocity in the left anterior tibial artery is 42 cm/s.  Soft tissues:  Soft tissue edema.    IMPRESSION:  1.  No significant left lower extremity arterial stenosis is demonstrated sonographically.  2.  Monophasic waveforms throughout suggest of inflow disease.    Electronically signed by:  Balbina Wallace MD  02/04/2024 01:25 AM [facility] Workstation: [id number]                                     X-Ray Wrist Complete Left (Final result)  Result time 02/04/24 08:01:36   Procedure changed from X-Ray Wrist 2 View Left     Final result by Bryson Lund MD (02/04/24 08:01:36)                   Narrative:    Reason: pain    FINDINGS:    4 views of  the left wrist a faint linear lucency at the distal radius. No dislocation or destructive osseous lesions. There are mild degenerative changes of the wrist joints. No gross soft tissue abnormality.    IMPRESSION:    Faint linear lucency at the distal radius could reflect an occult fracture. Consider further evaluation with CT.    Electronically signed by:  Bryson Lund DO  02/04/2024 08:01 AM CST Workstation: [id number]                                     X-Ray Knee 1 or 2 View Right (Final result)  Result time 02/04/24 08:00:18   Procedure changed from X-Ray Knee Complete 4 or more Views Right     Final result by Bryson Lund MD (02/04/24 08:00:18)                   Narrative:    Reason: pain    FINDINGS:    AP and lateral radiographs of the right knee demonstrate post surgical changes of total right knee arthroplasty. There is no acute fracture, dislocation or destructive osseous lesion. The orthopedic hardware is intact. Mild soft tissue swelling about the knee.    IMPRESSION:    1.  Intact postsurgical changes of total right knee arthroplasty.  2.  No acute osseous abnormality.    Electronically signed by:  Bryson Lund DO  02/04/2024 08:00 AM CST Workstation: [id number]                                     MRI Knee Without Contrast Left (Final result)  Result time 02/03/24 23:44:03      Final result by Checo Langston MD (02/03/24 23:44:03)                   Narrative:    INDICATION: L knee swelling    EXAMINATION: MRI - left MR KNEE WITHOUT IV CONTRAST LEFT      TECHNIQUE: Multiplanar and multisequence MR images of the left knee.    IV Contrast Dosage and Agent: None.    COMPARISON: None.  ____________________________________________    FINDINGS: Study is limited by body habitus.    BONE: There is no occult fracture or bone marrow edema. There is an area of increased T2 signal and decreased T1 signal within the proximal tibial metaphysis. 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 This does not  appear aggressive.    JOINT: There is a small joint effusion. There is no evidence of a loose body.    MUSCLES: Unremarkable.    MENISCI: There is no displaced meniscal tear. There is blunting of the body and anterior horn of the medial meniscus. This could be due to meniscal degeneration or prior meniscectomy. No displaced fragments are seen. There is no meniscal extrusion.    CRUCIATE LIGAMENTS: Anterior and posterior cruciate ligaments are intact.    COLLATERAL LIGAMENTS: Medial collateral ligament and lateral collateral ligamentous structures are intact. Patellar femoral ligaments appear normal.    CARTILAGE: There is cartilage thinning within the knee. This is most severe in the medial compartment. There is some mild subchondral cystic changes within the medial femoral condyle and also within the patella    OTHER SOFT TISSUES: There is a large complex fluid collection anterior to the patella and patellar tendon. This measures 9.9 x 8.8 x 3.4 cm. Findings are consistent with prepatellar bursitis.    IMPRESSION:  1.  Large complex fluid collection anterior to the patella and patellar tendon. Findings are consistent with prepatellar bursitis.  2.  There is blunting of the body and anterior horn of the medial meniscus. This could be due to meniscal degeneration or prior meniscectomy. There are no displaced fragments or meniscal extrusion.  3.  Degenerative changes are noted within the knee with cartilage thinning most severe in the medial compartment.  4.  Area of abnormal signal within the proximal tibial metaphysis may represent medullary bone infarct. This does not appear aggressive.    Electronically signed by:  Checo Langston MD  02/03/2024 11:44 PM [facility] Workstation: [id number]                                     CT Abdomen Pelvis  Without Contrast (Final result)  Result time 02/03/24 22:04:54      Final result by Henrik Rebollar MD (02/03/24 22:04:54)                   Narrative:    EXAM DESCRIPTION:  CT ABDOMEN PELVIS  WITHOUT CONTRAST  RadLex: CT ABDOMEN PELVIS WITHOUT IV CONTRAST    CLINICAL HISTORY:  68 years  Female;  Epigastric pain    TECHNOLOGIST NOTES:    TECHNIQUE: Helical CT imaging was obtained of the abdomen and pelvis with multiplanar reconstructions. This exam was performed according to our departmental dose-optimization program, which includes automated exposure control, adjustment of the mA and/or kV according to patient size and/or use of iterative reconstruction techniques.    COMPARISON: None currently available    FINDINGS:  Abdomen:  No evidence of acute disease in the visualized lung bases.  There is calcification in the coronary arteries. The liver is 23.3 cm.  The liver, spleen, pancreas, adrenal glands and kidneys show no acute abnormality. No evidence of acute pancreatitis.    There are no calcified gallstones. There is no gallbladder wall thickening. No pericholecystic fluid.  There is no gross biliary duct dilatation.  No significant hydronephrosis bilaterally. There is a 1.7 cm exophytic cyst from the left kidney with a mean Hounsfield measurement of 8.8. No follow-up imaging recommended. Exam limited by artifact, probably related to patient body habitus    No free air.    There is no significant free fluid.    There is no significant aneurysmal enlargement of the abdominal aorta. Moderate vascular calcifications.    Pelvis:  There is no evidence of bowel obstruction.    No herniated bowel loops.  . No focal inflammatory changes . The appendix is unremarkable. There is colonic diverticulosis without evidence of acute diverticulitis.  Evaluation of the bowel is limited without oral contrast or with incomplete bowel opacification.   There is mild free fluid in the pelvis. The bladder is grossly unremarkable.    No acute osseous finding. There is multilevel degenerative disc disease and vacuum disc phenomenon. There is also bilateral facet arthropathy.  There is a mild scoliosis.    IMPRESSION:  1.  No  bowel obstruction, herniated bowel loops or focal inflammatory changes.  2.  Uncomplicated colonic diverticulosis  3.  Evaluation is limited without contrast and by patient body habitus  4.  Please see body of report for non-critical findings.    Electronically signed by:  Henrik Rebollar MD  02/03/2024 10:04 PM CST Workstation: [id number]                                     X-Ray Chest AP Portable (Final result)  Result time 02/03/24 16:38:24      Final result by Roselyn Espino DO (02/03/24 16:38:24)                   Narrative:    AP chest radiograph: 2/3/2024 4:37 PM CST    History: 68 years  old Female with anemia.    Comparison: 12/22/22    Findings: The cardiomediastinal silhouette is enlarged. Atherosclerotic calcifications are seen at the aortic arch.    There are bilateral airspace opacities with interstitial and central vascular prominence.    No pneumothorax is seen.    There is blunting of both costophrenic angles, suggestive of trace effusions.    There is partial obscuration of both diaphragms, likely due to overlying airspace opacities and/or effusions.    Impression: There are bilateral airspace opacities with interstitial and central vascular prominence. These findings may reflect evidence of edema.    Electronically signed by:  Roselyn Espino DO  02/03/2024 04:38 PM CST Workstation: [id number]                                     Lab Results   Component Value Date    WBC 6.56 02/05/2024    HGB 7.8 (L) 02/05/2024    HCT 26.5 (L) 02/05/2024    MCV 91 02/05/2024     02/05/2024     BMP  Lab Results   Component Value Date     (L) 02/05/2024    K 4.4 02/05/2024     02/05/2024    CO2 25 02/05/2024    BUN 24 (H) 02/05/2024    CREATININE 2.1 (H) 02/05/2024    CALCIUM 8.3 (L) 02/05/2024    ANIONGAP 6 (L) 02/05/2024    GLU 90 02/05/2024     02/04/2024    GLU 85 02/03/2024       Results for orders placed during the hospital encounter of 12/22/22    Echo    Interpretation Summary  ·  The estimated ejection fraction is 40%. There is a anterior apical segment that is hypo to akinetic with a wall motion abnormality  · Grade II left ventricular diastolic dysfunction. Mean left atrial pressure 20 mm Hg. Mild mitral annular calcification  · There are segmental left ventricular wall motion abnormalities.  · Normal right ventricular size with normal right ventricular systolic function.  · Moderate left atrial enlargement.  · Moderate aortic regurgitation.  · There is moderate aortic valve stenosis.  · Aortic valve area is 1.55 cm2; peak velocity is 2.88 m/s; mean gradient is 15 mmHg.  · Moderate mitral regurgitation.  · There is mild pulmonary hypertension.  · Mild tricuspid regurgitation.  · Elevated central venous pressure (15 mmHg).  · The estimated PA systolic pressure is 47 mmHg.      '    Pre-op Assessment    I have reviewed the Patient Summary Reports.     I have reviewed the Nursing Notes. I have reviewed the NPO Status.   I have reviewed the Medications.     Review of Systems  Anesthesia Hx:  No problems with previous Anesthesia             Denies Family Hx of Anesthesia complications.    Denies Personal Hx of Anesthesia complications.                    Social:  Non-Smoker       Hematology/Oncology:  Hematology Normal                                     EENT/Dental:  EENT/Dental Normal           Cardiovascular:     Hypertension Valvular problems/Murmurs, AS, AI  CAD                                        Pulmonary:   COPD                     Renal/:  Chronic Renal Disease (acute on chronic kidney injury), CKD                Hepatic/GI:  Hepatic/GI Normal                 Musculoskeletal:  Arthritis               Neurological:    Neuromuscular Disease, (Fibromyalgia)                                   Endocrine:  Endocrine Normal          Morbid Obesity / BMI > 40  Dermatological:  Left knee necrotic skin, s/p fall/trauma.   Psych:    depression                Physical Exam  General: Well  nourished, Cooperative, Alert and Oriented    Airway:  Mallampati: II   Mouth Opening: Normal  TM Distance: Normal  Tongue: Normal  Neck ROM: Normal ROM    Dental:  Dentures    Chest/Lungs:  Clear to auscultation, Normal Respiratory Rate    Heart:  Rate: Normal  Rhythm: Regular Rhythm        Anesthesia Plan  Type of Anesthesia, risks & benefits discussed:    Anesthesia Type: Gen Supraglottic Airway  Intra-op Monitoring Plan: Standard ASA Monitors  Post Op Pain Control Plan: IV/PO Opioids PRN and multimodal analgesia  Induction:  IV  Informed Consent: Informed consent signed with the Patient and all parties understand the risks and agree with anesthesia plan.  All questions answered.   ASA Score: 3  Anesthesia Plan Notes: LMA  Multimodal analgesia with ofirmev 1000 mg.  PONV prophylaxis with Pepcid 20 mg IV, and Zofran 4 mg IV.        Ready For Surgery From Anesthesia Perspective.     .

## 2024-02-05 NOTE — CARE UPDATE
02/05/24 0819   Patient Assessment/Suction   Level of Consciousness (AVPU) alert   Respiratory Effort Normal;Unlabored   Expansion/Accessory Muscles/Retractions no retractions;no use of accessory muscles   All Lung Fields Breath Sounds wheezes, expiratory   Rhythm/Pattern, Respiratory pattern regular;unlabored   Cough Frequency no cough   PRE-TX-O2   Device (Oxygen Therapy) room air   SpO2 95 %   Pulse Oximetry Type Intermittent   $ Pulse Oximetry - Multiple Charge Pulse Oximetry - Multiple   Pulse 89   Resp 18   Aerosol Therapy   $ Aerosol Therapy Charges Aerosol Treatment   Daily Review of Necessity (SVN) completed   Respiratory Treatment Status (SVN) given   Treatment Route (SVN) mask;oxygen   Patient Position (SVN) HOB elevated   Post Treatment Assessment (SVN) breath sounds unchanged   Signs of Intolerance (SVN) none   Breath Sounds Post-Respiratory Treatment   Throughout All Fields Post-Treatment All Fields   Throughout All Fields Post-Treatment no change   Post-treatment Heart Rate (beats/min) 90   Post-treatment Resp Rate (breaths/min) 18   Education   $ Education Bronchodilator;15 min

## 2024-02-05 NOTE — PROCEDURES
[facility]  Date/Time: 2/5/2024 11:59 AM  Location procedure was performed: RETIRED [place] [id number] [facility]  Performed by: Elvis Joseph RN  Supervising provider: Jayson Boss RN  Assisting provider: Ramiro Damon MD  Pre-operative Diagnosis: Hematoma of knee  Consent Done: Yes  Time out: Immediately prior to procedure a time out was called to verify the correct patient, procedure, equipment, support staff and site/side marked as required  Indications: med administration  Anesthesia: local infiltration  Local anesthetic: lidocaine 1% without epinephrine  Anesthetic Total (mL): 5  Preparation: skin prepped with Betadine and skin prepped with ChloraPrep  Skin prep agent dried: skin prep agent completely dried prior to procedure  Sterile barriers: all five maximum sterile barriers used - cap, mask, sterile gown, sterile gloves, and large sterile sheet  Hand hygiene: hand hygiene performed prior to central venous catheter insertion  Location details: left basilic  Catheter type: double lumen  Catheter size: 5 Fr  Catheter Length: 41cm    Ultrasound guidance: yes  Vessel Caliber: medium and patent, compressibility normal  Vascular Doppler: not done  Needle advanced into vessel with real time Ultrasound guidance.  Guidewire confirmed in vessel.  Sterile sheath used.  no esophageal manometryNumber of attempts: 1  Post-procedure: blood return through all ports, chlorhexidine patch and sterile dressing applied  Technical procedures used: seldinger technique  Estimated blood loss (mL): 0  Specimens: No  Implants: No  Assessment: no pneumothorax on x-ray, placement verified by x-ray, tip termination and successful placement  Complications: none

## 2024-02-05 NOTE — ANESTHESIA POSTPROCEDURE EVALUATION
Anesthesia Post Evaluation    Patient: Iris Mueller    Procedure(s) Performed: Procedure(s) (LRB):  INCISION AND DRAINAGE, HEMATOMA (Left)    Final Anesthesia Type: general      Patient location during evaluation: PACU  Patient participation: Yes- Able to Participate  Level of consciousness: awake and alert  Post-procedure vital signs: reviewed and stable  Pain management: adequate  Airway patency: patent    PONV status at discharge: No PONV  Anesthetic complications: no      Cardiovascular status: blood pressure returned to baseline and stable  Respiratory status: unassisted and room air  Hydration status: euvolemic  Follow-up not needed.              Vitals Value Taken Time   /79 02/05/24 1415   Temp 36.7 °C (98 °F) 02/05/24 1330   Pulse 83 02/05/24 1427   Resp 22 02/05/24 1427   SpO2 96 % 02/05/24 1427   Vitals shown include unvalidated device data.      Event Time   Out of Recovery 02/05/2024 14:31:16         Pain/Ad Score: Pain Rating Prior to Med Admin: 8 (2/5/2024  1:38 PM)  Pain Rating Post Med Admin: 0 (2/5/2024 11:00 AM)  Ad Score: 10 (2/5/2024  2:15 PM)

## 2024-02-06 ENCOUNTER — TELEPHONE (OUTPATIENT)
Dept: FAMILY MEDICINE | Facility: CLINIC | Age: 69
End: 2024-02-06
Payer: MEDICARE

## 2024-02-06 ENCOUNTER — CLINICAL SUPPORT (OUTPATIENT)
Dept: SMOKING CESSATION | Facility: CLINIC | Age: 69
End: 2024-02-06
Payer: COMMERCIAL

## 2024-02-06 DIAGNOSIS — F17.200 NICOTINE DEPENDENCE: Primary | ICD-10-CM

## 2024-02-06 DIAGNOSIS — M17.0 PRIMARY OSTEOARTHRITIS OF BOTH KNEES: ICD-10-CM

## 2024-02-06 LAB
ALBUMIN SERPL BCP-MCNC: 3.2 G/DL (ref 3.5–5.2)
ALP SERPL-CCNC: 93 U/L (ref 55–135)
ALT SERPL W/O P-5'-P-CCNC: 7 U/L (ref 10–44)
ANION GAP SERPL CALC-SCNC: 6 MMOL/L (ref 8–16)
AST SERPL-CCNC: 9 U/L (ref 10–40)
BASOPHILS # BLD AUTO: 0.03 K/UL (ref 0–0.2)
BASOPHILS NFR BLD: 0.4 % (ref 0–1.9)
BILIRUB SERPL-MCNC: 0.4 MG/DL (ref 0.1–1)
BUN SERPL-MCNC: 22 MG/DL (ref 8–23)
CALCIUM SERPL-MCNC: 8.2 MG/DL (ref 8.7–10.5)
CHLORIDE SERPL-SCNC: 103 MMOL/L (ref 95–110)
CO2 SERPL-SCNC: 25 MMOL/L (ref 23–29)
CREAT SERPL-MCNC: 2 MG/DL (ref 0.5–1.4)
DIFFERENTIAL METHOD BLD: ABNORMAL
EOSINOPHIL # BLD AUTO: 0.1 K/UL (ref 0–0.5)
EOSINOPHIL NFR BLD: 1.9 % (ref 0–8)
ERYTHROCYTE [DISTWIDTH] IN BLOOD BY AUTOMATED COUNT: 17.2 % (ref 11.5–14.5)
EST. GFR  (NO RACE VARIABLE): 26.4 ML/MIN/1.73 M^2
GLUCOSE SERPL-MCNC: 90 MG/DL (ref 70–110)
HCT VFR BLD AUTO: 26.5 % (ref 37–48.5)
HGB BLD-MCNC: 7.6 G/DL (ref 12–16)
IMM GRANULOCYTES # BLD AUTO: 0.04 K/UL (ref 0–0.04)
IMM GRANULOCYTES NFR BLD AUTO: 0.5 % (ref 0–0.5)
LYMPHOCYTES # BLD AUTO: 0.5 K/UL (ref 1–4.8)
LYMPHOCYTES NFR BLD: 7 % (ref 18–48)
MCH RBC QN AUTO: 26.8 PG (ref 27–31)
MCHC RBC AUTO-ENTMCNC: 28.7 G/DL (ref 32–36)
MCV RBC AUTO: 93 FL (ref 82–98)
MONOCYTES # BLD AUTO: 0.5 K/UL (ref 0.3–1)
MONOCYTES NFR BLD: 6.6 % (ref 4–15)
NEUTROPHILS # BLD AUTO: 6.2 K/UL (ref 1.8–7.7)
NEUTROPHILS NFR BLD: 83.6 % (ref 38–73)
NRBC BLD-RTO: 0 /100 WBC
PLATELET # BLD AUTO: 384 K/UL (ref 150–450)
PMV BLD AUTO: 9.2 FL (ref 9.2–12.9)
POTASSIUM SERPL-SCNC: 4.9 MMOL/L (ref 3.5–5.1)
PROT SERPL-MCNC: 5.5 G/DL (ref 6–8.4)
RBC # BLD AUTO: 2.84 M/UL (ref 4–5.4)
SODIUM SERPL-SCNC: 134 MMOL/L (ref 136–145)
VANCOMYCIN SERPL-MCNC: 12.2 UG/ML
WBC # BLD AUTO: 7.41 K/UL (ref 3.9–12.7)

## 2024-02-06 PROCEDURE — 99900031 HC PATIENT EDUCATION (STAT)

## 2024-02-06 PROCEDURE — 97530 THERAPEUTIC ACTIVITIES: CPT | Mod: CQ

## 2024-02-06 PROCEDURE — 63600175 PHARM REV CODE 636 W HCPCS: Performed by: INTERNAL MEDICINE

## 2024-02-06 PROCEDURE — 99900035 HC TECH TIME PER 15 MIN (STAT)

## 2024-02-06 PROCEDURE — 25000003 PHARM REV CODE 250: Performed by: ORTHOPAEDIC SURGERY

## 2024-02-06 PROCEDURE — 63600175 PHARM REV CODE 636 W HCPCS: Performed by: ORTHOPAEDIC SURGERY

## 2024-02-06 PROCEDURE — S4991 NICOTINE PATCH NONLEGEND: HCPCS | Performed by: INTERNAL MEDICINE

## 2024-02-06 PROCEDURE — 80053 COMPREHEN METABOLIC PANEL: CPT | Performed by: ORTHOPAEDIC SURGERY

## 2024-02-06 PROCEDURE — 94760 N-INVAS EAR/PLS OXIMETRY 1: CPT

## 2024-02-06 PROCEDURE — 99407 BEHAV CHNG SMOKING > 10 MIN: CPT | Mod: S$GLB,,, | Performed by: INTERNAL MEDICINE

## 2024-02-06 PROCEDURE — 97535 SELF CARE MNGMENT TRAINING: CPT

## 2024-02-06 PROCEDURE — 94761 N-INVAS EAR/PLS OXIMETRY MLT: CPT

## 2024-02-06 PROCEDURE — 21400001 HC TELEMETRY ROOM

## 2024-02-06 PROCEDURE — 25000003 PHARM REV CODE 250: Performed by: STUDENT IN AN ORGANIZED HEALTH CARE EDUCATION/TRAINING PROGRAM

## 2024-02-06 PROCEDURE — 25000003 PHARM REV CODE 250: Performed by: INTERNAL MEDICINE

## 2024-02-06 PROCEDURE — 99406 BEHAV CHNG SMOKING 3-10 MIN: CPT

## 2024-02-06 PROCEDURE — 27000221 HC OXYGEN, UP TO 24 HOURS

## 2024-02-06 PROCEDURE — 80202 ASSAY OF VANCOMYCIN: CPT | Performed by: ORTHOPAEDIC SURGERY

## 2024-02-06 PROCEDURE — 85025 COMPLETE CBC W/AUTO DIFF WBC: CPT | Performed by: ORTHOPAEDIC SURGERY

## 2024-02-06 RX ORDER — OXYCODONE HYDROCHLORIDE 5 MG/1
5 TABLET ORAL EVERY 4 HOURS PRN
Status: DISCONTINUED | OUTPATIENT
Start: 2024-02-06 | End: 2024-02-12 | Stop reason: HOSPADM

## 2024-02-06 RX ORDER — IBUPROFEN 200 MG
1 TABLET ORAL DAILY
Status: DISCONTINUED | OUTPATIENT
Start: 2024-02-06 | End: 2024-02-12 | Stop reason: HOSPADM

## 2024-02-06 RX ADMIN — MUPIROCIN 1 G: 20 OINTMENT TOPICAL at 09:02

## 2024-02-06 RX ADMIN — AMLODIPINE BESYLATE 2.5 MG: 2.5 TABLET ORAL at 09:02

## 2024-02-06 RX ADMIN — ATORVASTATIN CALCIUM 10 MG: 10 TABLET, FILM COATED ORAL at 09:02

## 2024-02-06 RX ADMIN — GABAPENTIN 300 MG: 300 CAPSULE ORAL at 09:02

## 2024-02-06 RX ADMIN — TRAZODONE HYDROCHLORIDE 100 MG: 50 TABLET ORAL at 09:02

## 2024-02-06 RX ADMIN — AMIODARONE HYDROCHLORIDE 200 MG: 200 TABLET ORAL at 08:02

## 2024-02-06 RX ADMIN — METOPROLOL SUCCINATE 25 MG: 25 TABLET, EXTENDED RELEASE ORAL at 09:02

## 2024-02-06 RX ADMIN — Medication 9 MG: at 09:02

## 2024-02-06 RX ADMIN — FLUOXETINE HYDROCHLORIDE 40 MG: 20 CAPSULE ORAL at 08:02

## 2024-02-06 RX ADMIN — OXYCODONE HYDROCHLORIDE 5 MG: 5 TABLET ORAL at 04:02

## 2024-02-06 RX ADMIN — GABAPENTIN 300 MG: 300 CAPSULE ORAL at 08:02

## 2024-02-06 RX ADMIN — NICOTINE 1 PATCH: 21 PATCH, EXTENDED RELEASE TRANSDERMAL at 11:02

## 2024-02-06 RX ADMIN — TRAZODONE HYDROCHLORIDE 100 MG: 50 TABLET ORAL at 12:02

## 2024-02-06 RX ADMIN — CEFTRIAXONE SODIUM 1 G: 1 INJECTION, POWDER, FOR SOLUTION INTRAMUSCULAR; INTRAVENOUS at 07:02

## 2024-02-06 RX ADMIN — BUPROPION HYDROCHLORIDE 150 MG: 150 TABLET, EXTENDED RELEASE ORAL at 09:02

## 2024-02-06 RX ADMIN — HYDROCODONE BITARTRATE AND ACETAMINOPHEN 1 TABLET: 7.5; 325 TABLET ORAL at 12:02

## 2024-02-06 RX ADMIN — MUPIROCIN 1 G: 20 OINTMENT TOPICAL at 08:02

## 2024-02-06 RX ADMIN — CETIRIZINE HYDROCHLORIDE 10 MG: 10 TABLET, FILM COATED ORAL at 08:02

## 2024-02-06 RX ADMIN — BUPROPION HYDROCHLORIDE 150 MG: 150 TABLET, EXTENDED RELEASE ORAL at 08:02

## 2024-02-06 RX ADMIN — VANCOMYCIN HYDROCHLORIDE 500 MG: 500 INJECTION, POWDER, LYOPHILIZED, FOR SOLUTION INTRAVENOUS at 11:02

## 2024-02-06 RX ADMIN — METOPROLOL SUCCINATE 25 MG: 25 TABLET, EXTENDED RELEASE ORAL at 08:02

## 2024-02-06 RX ADMIN — GABAPENTIN 300 MG: 300 CAPSULE ORAL at 04:02

## 2024-02-06 NOTE — PLAN OF CARE
Problem: Physical Therapy  Goal: Physical Therapy Goal  Description: Goals to be met by: 24     Patient will increase functional independence with mobility by performin. Supine to sit with [place]  2. Sit to stand transfer with Modified [place]  3. Gait  x 75 feet with Modified [place] using Rolling Walker.     Outcome: Ongoing, Progressing

## 2024-02-06 NOTE — TELEPHONE ENCOUNTER
----- Message from Mari Mccoy sent at 2/6/2024 12:13 PM CST -----  Patient had a F/U appointment  for tomorrow but is in the hospital.  She wants refills on her pain meds? [phone].

## 2024-02-06 NOTE — PROGRESS NOTES
02/06/24 1200   Tobacco Cessation Intervention   Do you use any type of tobacco product? Yes   Are you interested in quitting use of tobacco products? Ready to quit   Are you interested in Nicotine Replacement for symptom relief? Yes   $ Smoking Cessation Charges Smoking Cessation - Intermediate (Non-CTTS)

## 2024-02-06 NOTE — PT/OT/SLP PROGRESS
Physical Therapy Treatment    Patient Name:  Iris Mueller   MRN:  [id number]    Recommendations:     Discharge Recommendations: Low Intensity Therapy  Discharge Equipment Recommendations: none  Barriers to discharge:  increased assist with mobility, decreased activity tolerance, pain, orthopedic precautions, decreased safety awareness    Assessment:     Iris Mueller is a 68 y.o. female admitted with a medical diagnosis of Acute blood loss anemia.  She presents with the following impairments/functional limitations: weakness, impaired endurance, impaired self care skills, impaired functional mobility, gait instability, impaired balance, impaired cognition, decreased coordination, decreased upper extremity function, decreased lower extremity function, decreased safety awareness, decreased ROM, impaired skin, impaired cardiopulmonary response to activity.    Pt with HOB elevated and daughter present. Pt agreeable to visit. Pt with some confusion which could be attributed to pain medication. Daughter reports pt has been some what confused today.    Pt required max assist x 2 persons for supine to sit transfers with verbal cuing for sequencing and hand placement. Pt with posterior lean requiring max verbal cuing and max to mod assist to correct.    Once sitting EOB pt reports that she needs to put on her drawers before she does anything else. Pt intermittently leaning posteriorly requiring max verbal cuing and mod assist to correct. Pt assisted with donning underwear per her request.    Pt required mod assist x 2 for sit to stand transfer with rollator. Pt with right lateral lean and decreased tolerance for weight bearing through LLE due to pain. Therapist attempted to facilitate midline positioning however pt resisting due to pain in LLE. Pt wanting to take steps, but educated that it was unsafe at this time due to pt being unable to tolerate WB on LLE.    Pt reports fatigue post stand trials. Pt  required max assist x 2 for return to supine and scooting towards HOB.    Pt extremely fatigued and nodding off once return to bed.    Rehab Prognosis: Fair; patient would benefit from acute skilled PT services to address these deficits and reach maximum level of function.    Recent Surgery: Procedure(s) (LRB):  INCISION AND DRAINAGE, HEMATOMA (Left) 1 Day Post-Op    Plan:     During this hospitalization, patient to be seen 6 x/week to address the identified rehab impairments via gait training, therapeutic activities, therapeutic exercises, neuromuscular re-education and progress toward the following goals:    Plan of Care Expires:  03/04/24    Subjective     Chief Complaint: pain in LLE  Patient/Family Comments/goals: to walk  Pain/Comfort:  Pain Rating 1: other (see comments) (not rated)  Location - Side 1: Left  Location 1: knee  Pain Addressed 1: Reposition, Distraction, Cessation of Activity, Pre-medicate for activity  Pain Rating Post-Intervention 1: other (see comments) (not rated)      Objective:     Communicated with RN prior to session.  Patient found HOB elevated with telemetry, peripheral IV, oxygen, wound vac, bed alarm upon PT entry to room.     General Precautions: Standard, fall  Orthopedic Precautions: LLE weight bearing as tolerated (with knee immobilizer in place)  Braces: Knee immobilizer  Respiratory Status: Nasal cannula, flow 5 L/min     Functional Mobility:  Bed Mobility:     Rolling Left:  maximal assistance  Rolling Right: maximal assistance  Scooting: maximal assistance and of 2 persons  Supine to Sit: maximal assistance and of 2 persons  Sit to Supine: maximal assistance and of 2 persons  Transfers:     Sit to Stand:  moderate assistance and of 2 persons with rollator  Balance: poor sitting balance, intermittent posterior lean and right lateral lean; fair standing balance with right lateral lean      AM-PAC 6 CLICK MOBILITY          Treatment & Education:  Pt educated on importance of  time OOB, importance of intermittent mobility, safe techniques for transfers/ambulation, discharge recommendations/options, and use of call light for assistance and fall prevention.      Patient left HOB elevated with all lines intact, call button in reach, bed alarm on, RN notified, and family present..    GOALS:   Multidisciplinary Problems       Physical Therapy Goals          Problem: Physical Therapy    Goal Priority Disciplines Outcome Goal Variances Interventions   Physical Therapy Goal     PT, PT/OT      Description: Goals to be met by: 24     Patient will increase functional independence with mobility by performin. Supine to sit with Railroad  2. Sit to stand transfer with Modified Railroad  3. Gait  x 75 feet with Modified Railroad using Rolling Walker.                          Time Tracking:     PT Received On: 24  PT Start Time: 1348     PT Stop Time: 1426  PT Total Time (min): 38 min     Billable Minutes: Therapeutic Activity 38    Treatment Type: Treatment  PT/PTA: PTA     Number of PTA visits since last PT visit: 2024

## 2024-02-06 NOTE — PROGRESS NOTES
[facility]  Orthopedics  Progress Note    Patient Name: Iris Mueller  MRN: [id number]  Admission Date: 2/3/2024  Hospital Length of Stay: 3 days  Attending Provider: Ramiro Damon MD  Primary Care Provider: Elkin You MD  Follow-up For: Procedure(s) (LRB):  INCISION AND DRAINAGE, HEMATOMA (Left)    Post-Operative Day: 1 Day Post-Op  Subjective:     Principal Problem:Acute blood loss anemia    Principal Orthopedic Problem:  Left knee skin wound    Interval History:  Doing well.  Resting comfortably in bed.  Not able to mobilize with physical therapy very well.    Review of patient's allergies indicates:  No Known Allergies    Current Facility-Administered Medications   Medication    0.9%  NaCl infusion (for blood administration)    albuterol nebulizer solution 2.5 mg    amiodarone tablet 200 mg    amLODIPine tablet 2.5 mg    atorvastatin tablet 10 mg    buPROPion TBSR 12 hr tablet 150 mg    ceftaroline (TEFLARO) 400 mg in dextrose 5 % 50 mL IVPB (ready to mix)    cefTRIAXone (ROCEPHIN) 1 g in dextrose 5 % 100 mL IVPB (ready to mix)    cetirizine tablet 10 mg    dextrose 50% injection 12.5 g    dextrose 50% injection 25 g    diphenhydrAMINE injection 12.5 mg    FLUoxetine capsule 40 mg    gabapentin capsule 300 mg    glucagon (human recombinant) injection 1 mg    glucose chewable tablet 16 g    glucose chewable tablet 24 g    HYDROmorphone injection 0.2 mg    melatonin tablet 9 mg    metoprolol succinate (TOPROL-XL) 24 hr tablet 25 mg    mupirocin 2 % ointment    naloxone 0.4 mg/mL injection 0.02 mg    nicotine 21 mg/24 hr 1 patch    ondansetron injection 4 mg    ondansetron injection 4 mg    oxyCODONE immediate release tablet 5 mg    sodium chloride 0.9% flush 10 mL    sodium chloride 0.9% flush 10 mL    traZODone tablet 100 mg    vancomycin - pharmacy to dose    vancomycin 500 mg in dextrose 5 % 100 mL IVPB (ready to mix)     Facility-Administered Medications Ordered in Other  "Encounters   Medication    lidocaine (PF) 10 mg/ml (1%) injection 5 mg     Objective:     Vital Signs (Most Recent):  Temp: 98.4 °F (36.9 °C) (02/06/24 1100)  Pulse: 83 (02/06/24 1100)  Resp: 18 (02/06/24 1100)  BP: (!) 142/91 (02/06/24 1100)  SpO2: (!) 94 % (02/06/24 1100) Vital Signs (24h Range):  Temp:  [98.4 °F (36.9 °C)] 98.4 °F (36.9 °C)  Pulse:  [] 83  Resp:  [17-18] 18  SpO2:  [92 %-98 %] 94 %  BP: (107-161)/(77-91) 142/91     Weight: 115.8 kg (255 lb 4.8 oz)  Height: 5' 3" (160 cm)  Body mass index is 45.22 kg/m².      Intake/Output Summary (Last 24 hours) at 2/6/2024 1501  Last data filed at 2/6/2024 0415  Gross per 24 hour   Intake 460 ml   Output 800 ml   Net -340 ml        General    Nursing note and vitals reviewed.  Constitutional: She is oriented to person, place, and time. She appears well-developed and well-nourished. No distress.   HENT:   Head: Normocephalic and atraumatic.   Eyes: EOM are normal.   Cardiovascular:  Normal rate.            Pulmonary/Chest: Effort normal.   Abdominal: Soft.   Neurological: She is alert and oriented to person, place, and time.   Psychiatric: Her behavior is normal.           Right Knee Exam   Right knee exam is normal.    Left Knee Exam     Inspection   Erythema: absent  Swelling: present    Range of Motion   Extension:  0   Flexion:  0     Other   Sensation: normal    Comments:  Wound VAC with good seal.  Wearing the knee immobilizer.    Muscle Strength   Left Lower Extremity   Quadriceps:  4/5     Vascular Exam       Left Pulses  Dorsalis Pedis:      2+          Edema  Left Lower Leg: absent       Significant Labs: CBC:   Recent Labs   Lab 02/05/24  0442 02/06/24  0433   WBC 6.56 7.41   HGB 7.8* 7.6*   HCT 26.5* 26.5*    384     CMP:   Recent Labs   Lab 02/05/24 0441 02/06/24  0433   * 134*   K 4.4 4.9    103   CO2 25 25   GLU 90 90   BUN 24* 22   CREATININE 2.1* 2.0*   CALCIUM 8.3* 8.2*   PROT 5.8* 5.5*   ALBUMIN 3.5 3.2*   BILITOT 0.5 " "0.4   ALKPHOS 103 93   AST 8* 9*   ALT 8* 7*   ANIONGAP 6* 6*     Coagulation: No results for input(s): "LABPROT", "INR", "APTT" in the last 48 hours.  All pertinent labs within the past 24 hours have been reviewed.    Significant Imaging: None  Assessment/Plan:     Traumatic hematoma of knee, left, initial encounter  Continue the wound VAC until the battery runs out.  Okay to weight bear as tolerated in the knee immobilizer.  Follow up with me in 2 weeks for wound check and suture removal.  Patient may remove the brace but needs to keep the knee straight at all times.            Bryson Huerta MD  Orthopedics  [facility]    "

## 2024-02-06 NOTE — PROGRESS NOTES
[facility] Medicine  Progress Note    Patient Name: Iris Mueller  MRN: [id number]  Patient Class: IP- Inpatient   Admission Date: 2/3/2024  Length of Stay: 3 days  Attending Physician: Ramiro Damon MD  Primary Care Provider: Elkin You MD        Subjective:     Principal Problem:Acute blood loss anemia        HPI:  No notes on file    Overview/Hospital Course:  2/4/2024  Ms Mueller has difficulty with left knee pain  6/10 and difficulty walking due to left knee pain /dysfunction  She has no other complaints    2/5/2024  Ms Mueller is is somnolent this AM without any new complaints  She is anxious to have her left knee surgery    2/6/2026  Pt is feeling better . She has 4-8/10 left knee pain and is upset because the wound vac is making noise    Interval History: Ms Mueller is S/P left knee debridement. We have added oxycodone for pain due to increased pain. Orthopedic input appreciated    Review of Systems   Constitutional:  Positive for activity change, diaphoresis and fatigue.   HENT: Negative.     Eyes: Negative.    Respiratory: Negative.     Cardiovascular: Negative.    Gastrointestinal: Negative.    Endocrine: Negative.    Genitourinary: Negative.    Musculoskeletal:  Positive for arthralgias, gait problem, joint swelling and myalgias.   Skin:  Positive for wound.   Allergic/Immunologic: Negative.    Neurological:  Positive for weakness.   Hematological: Negative.    Psychiatric/Behavioral:  The patient is nervous/anxious.      Objective:     Vital Signs (Most Recent):  Temp: 98.4 °F (36.9 °C) (02/06/24 1100)  Pulse: 83 (02/06/24 1100)  Resp: 18 (02/06/24 1100)  BP: (!) 142/91 (02/06/24 1100)  SpO2: (!) 94 % (02/06/24 1100) Vital Signs (24h Range):  Temp:  [98.2 °F (36.8 °C)-98.4 °F (36.9 °C)] 98.4 °F (36.9 °C)  Pulse:  [] 83  Resp:  [17-19] 18  SpO2:  [92 %-98 %] 94 %  BP: (107-161)/(77-91) 142/91     Weight: 115.8 kg (255 lb 4.8 oz)  Body mass index  is 45.22 kg/m².    Intake/Output Summary (Last 24 hours) at 2/6/2024 1432  Last data filed at 2/6/2024 0415  Gross per 24 hour   Intake 460 ml   Output 800 ml   Net -340 ml         Physical Exam  Vitals and nursing note reviewed.   Constitutional:       General: She is not in acute distress.     Appearance: She is not ill-appearing.   HENT:      Head: Atraumatic.      Comments: Frontal laceration with crusting-no erythema or exudate     Nose: Nose normal.   Eyes:      Extraocular Movements: Extraocular movements intact.      Pupils: Pupils are equal, round, and reactive to light.   Cardiovascular:      Rate and Rhythm: Normal rate and regular rhythm.   Pulmonary:      Effort: Pulmonary effort is normal.      Breath sounds: Normal breath sounds.   Abdominal:      General: Bowel sounds are normal.   Musculoskeletal:         General: Normal range of motion.      Cervical back: Normal range of motion and neck supple.   Skin:     General: Skin is warm.   Neurological:      Mental Status: She is alert and oriented to person, place, and time.   Psychiatric:         Mood and Affect: Mood normal.             Significant Labs: All pertinent labs within the past 24 hours have been reviewed.  Recent Lab Results         02/06/24  1010   02/06/24  0433        Albumin   3.2       ALP   93       ALT   7       Anion Gap   6       AST   9       Baso #   0.03       Basophil %   0.4       BILIRUBIN TOTAL   0.4  Comment: For infants and newborns, interpretation of results should be based  on gestational age, weight and in agreement with clinical  observations.    Premature Infant recommended reference ranges:  Up to 24 hours.............<8.0 mg/dL  Up to 48 hours............<12.0 mg/dL  3-5 days..................<15.0 mg/dL  6-29 days.................<15.0 mg/dL         BUN   22       Calcium   8.2       Chloride   103       CO2   25       Creatinine   2.0       Differential Method   Automated       eGFR   26.4       Eos #   0.1        Eos %   1.9       Glucose   90       Gran # (ANC)   6.2       Gran %   83.6       Hematocrit   26.5       Hemoglobin   7.6       Immature Grans (Abs)   0.04  Comment: Mild elevation in immature granulocytes is non specific and   can be seen in a variety of conditions including stress response,   acute inflammation, trauma and pregnancy. Correlation with other   laboratory and clinical findings is essential.         Immature Granulocytes   0.5       Lymph #   0.5       Lymph %   7.0       MCH   26.8       MCHC   28.7       MCV   93       Mono #   0.5       Mono %   6.6       MPV   9.2       nRBC   0       Platelet Count   384       Potassium   4.9       PROTEIN TOTAL   5.5       RBC   2.84       RDW   17.2       Sodium   134       Vancomycin, Random 12.2         WBC   7.41               Significant Imaging: I have reviewed all pertinent imaging results/findings within the past 24 hours.    Assessment/Plan:      Traumatic hematoma of knee, left, initial encounter  Pt has a full thickness necrotic area left knee which Dr Marie will debride tomorrow  Continue ceftriaxone and vancomycin    S/P debridement with a wound vac in place   Continuing antibiotics        VTE Risk Mitigation (From admission, onward)           Ordered     IP VTE LOW RISK PATIENT  Once         02/05/24 1449     Place sequential compression device  Until discontinued         02/05/24 1449                    Discharge Planning   VALERIANO: 2/8/2024     Code Status: Full Code   Is the patient medically ready for discharge?:     Reason for patient still in hospital (select all that apply): Patient new problem, Treatment, and Consult recommendations  Discharge Plan A: Home Health   Discharge Delays: None known at this time              Ramiro Damon MD  Department of Hospital Medicine   [facility]

## 2024-02-06 NOTE — SUBJECTIVE & OBJECTIVE
Principal Problem:Acute blood loss anemia    Principal Orthopedic Problem:  Left knee skin wound    Interval History:  Doing well.  Resting comfortably in bed.  Not able to mobilize with physical therapy very well.    Review of patient's allergies indicates:  No Known Allergies    Current Facility-Administered Medications   Medication    0.9%  NaCl infusion (for blood administration)    albuterol nebulizer solution 2.5 mg    amiodarone tablet 200 mg    amLODIPine tablet 2.5 mg    atorvastatin tablet 10 mg    buPROPion TBSR 12 hr tablet 150 mg    ceftaroline (TEFLARO) 400 mg in dextrose 5 % 50 mL IVPB (ready to mix)    cefTRIAXone (ROCEPHIN) 1 g in dextrose 5 % 100 mL IVPB (ready to mix)    cetirizine tablet 10 mg    dextrose 50% injection 12.5 g    dextrose 50% injection 25 g    diphenhydrAMINE injection 12.5 mg    FLUoxetine capsule 40 mg    gabapentin capsule 300 mg    glucagon (human recombinant) injection 1 mg    glucose chewable tablet 16 g    glucose chewable tablet 24 g    HYDROmorphone injection 0.2 mg    melatonin tablet 9 mg    metoprolol succinate (TOPROL-XL) 24 hr tablet 25 mg    mupirocin 2 % ointment    naloxone 0.4 mg/mL injection 0.02 mg    nicotine 21 mg/24 hr 1 patch    ondansetron injection 4 mg    ondansetron injection 4 mg    oxyCODONE immediate release tablet 5 mg    sodium chloride 0.9% flush 10 mL    sodium chloride 0.9% flush 10 mL    traZODone tablet 100 mg    vancomycin - pharmacy to dose    vancomycin 500 mg in dextrose 5 % 100 mL IVPB (ready to mix)     Facility-Administered Medications Ordered in Other Encounters   Medication    lidocaine (PF) 10 mg/ml (1%) injection 5 mg     Objective:     Vital Signs (Most Recent):  Temp: 98.4 °F (36.9 °C) (02/06/24 1100)  Pulse: 83 (02/06/24 1100)  Resp: 18 (02/06/24 1100)  BP: (!) 142/91 (02/06/24 1100)  SpO2: (!) 94 % (02/06/24 1100) Vital Signs (24h Range):  Temp:  [98.4 °F (36.9 °C)] 98.4 °F (36.9 °C)  Pulse:  [] 83  Resp:  [17-18]  "18  SpO2:  [92 %-98 %] 94 %  BP: (107-161)/(77-91) 142/91     Weight: 115.8 kg (255 lb 4.8 oz)  Height: 5' 3" (160 cm)  Body mass index is 45.22 kg/m².      Intake/Output Summary (Last 24 hours) at 2/6/2024 1501  Last data filed at 2/6/2024 0415  Gross per 24 hour   Intake 460 ml   Output 800 ml   Net -340 ml        General    Nursing note and vitals reviewed.  Constitutional: She is oriented to person, place, and time. She appears well-developed and well-nourished. No distress.   HENT:   Head: Normocephalic and atraumatic.   Eyes: EOM are normal.   Cardiovascular:  Normal rate.            Pulmonary/Chest: Effort normal.   Abdominal: Soft.   Neurological: She is alert and oriented to person, place, and time.   Psychiatric: Her behavior is normal.           Right Knee Exam   Right knee exam is normal.    Left Knee Exam     Inspection   Erythema: absent  Swelling: present    Range of Motion   Extension:  0   Flexion:  0     Other   Sensation: normal    Comments:  Wound VAC with good seal.  Wearing the knee immobilizer.    Muscle Strength   Left Lower Extremity   Quadriceps:  4/5     Vascular Exam       Left Pulses  Dorsalis Pedis:      2+          Edema  Left Lower Leg: absent       Significant Labs: CBC:   Recent Labs   Lab 02/05/24  0442 02/06/24  0433   WBC 6.56 7.41   HGB 7.8* 7.6*   HCT 26.5* 26.5*    384     CMP:   Recent Labs   Lab 02/05/24  0441 02/06/24  0433   * 134*   K 4.4 4.9    103   CO2 25 25   GLU 90 90   BUN 24* 22   CREATININE 2.1* 2.0*   CALCIUM 8.3* 8.2*   PROT 5.8* 5.5*   ALBUMIN 3.5 3.2*   BILITOT 0.5 0.4   ALKPHOS 103 93   AST 8* 9*   ALT 8* 7*   ANIONGAP 6* 6*     Coagulation: No results for input(s): "LABPROT", "INR", "APTT" in the last 48 hours.  All pertinent labs within the past 24 hours have been reviewed.    Significant Imaging: None  "

## 2024-02-06 NOTE — ASSESSMENT & PLAN NOTE
Continue the wound VAC until the battery runs out.  Okay to weight bear as tolerated in the knee immobilizer.  Follow up with me in 2 weeks for wound check and suture removal.  Patient may remove the brace but needs to keep the knee straight at all times.

## 2024-02-06 NOTE — PLAN OF CARE
02/06/24 0715   Patient Assessment/Suction   Level of Consciousness (AVPU) alert   Respiratory Effort Normal;Unlabored   Expansion/Accessory Muscles/Retractions no use of accessory muscles   All Lung Fields Breath Sounds diminished   Rhythm/Pattern, Respiratory pattern regular   Cough Frequency infrequent   Cough Type nonproductive   PRE-TX-O2   Device (Oxygen Therapy) nasal cannula   Flow (L/min) 4   Oxygen Concentration (%) 36   SpO2 (!) 92 %   Pulse Oximetry Type Intermittent   $ Pulse Oximetry - Single Charge Pulse Oximetry - Single   Pulse 104   Resp 17   Aerosol Therapy   $ Aerosol Therapy Charges PRN treatment not required   Respiratory Treatment Status (SVN) refused  (eating)   Ready to Wean/Extubation Screen   FIO2<=50 (chart decimal) 0.36   Education   $ Education Bronchodilator;15 min  (SpO2)

## 2024-02-06 NOTE — CONSULTS
Chief complaint:  ABNORMAL LABS (LOW H/H. PCP CALLED AND TOLD PT TO COME TO ER, BLOOD DRAWN YESTERDAY) and Shortness of Breath (MORE THAN NORMAL)      HPI:  Iris Mueller is a 68 y.o. female presenting with a left knee surgical wound from an earlier washout today. Pt also has a dried thick eschar of the forehead. She is post surgical and unable to give a history but her family was present and stated she had a spill at home and got the knee elbow and forehead wounds. All were POA.     PMH:  As per HPI and below:  Past Medical History:   Diagnosis Date    Anemia, unspecified     Arthritis     Asthma     COPD (chronic obstructive pulmonary disease)     Encounter for blood transfusion     Hypertension     Obese body habitus     Wound infection 08/2019    Right knee       Social History     Socioeconomic History    Marital status:    Tobacco Use    Smoking status: Every Day     Current packs/day: 0.25     Average packs/day: 0.5 packs/day for 49.1 years (23.5 ttl pk-yrs)     Types: Cigarettes     Start date: 1975     Passive exposure: Current    Smokeless tobacco: Never   Substance and Sexual Activity    Alcohol use: Yes     Comment: RARELY    Drug use: Never    Sexual activity: Not Currently     Partners: Male     Social Determinants of Health     Financial Resource Strain: Patient Declined (12/23/2022)    Overall Financial Resource Strain (CARDIA)     Difficulty of Paying Living Expenses: Patient declined   Food Insecurity: Patient Declined (12/23/2022)    Hunger Vital Sign     Worried About Running Out of Food in the Last Year: Patient declined     Ran Out of Food in the Last Year: Patient declined   Transportation Needs: Patient Declined (12/23/2022)    PRAPARE - Transportation     Lack of Transportation (Medical): Patient declined     Lack of Transportation (Non-Medical): Patient declined   Physical Activity: Patient Declined (12/23/2022)    Exercise Vital Sign     Days of Exercise per Week: Patient  declined     Minutes of Exercise per Session: Patient declined   Stress: Patient Declined (2022)    [employer] [place] [employer] - Occupational Stress Questionnaire     Feeling of Stress : Patient declined   Social Connections: Patient Declined (2022)    Social Connection and Isolation Panel [NHANES]     Frequency of Communication with Friends and Family: Patient declined     Frequency of Social Gatherings with Friends and Family: Patient declined     Attends Protestant Services: Patient declined     Active Member of Clubs or Organizations: Patient declined     Attends Club or Organization Meetings: Patient declined     Marital Status: Patient declined   Housing Stability: Unknown (2022)    Housing Stability Vital Sign     Unable to Pay for Housing in the Last Year: Patient refused     Unstable Housing in the Last Year: Patient refused       Past Surgical History:   Procedure Laterality Date    ANGIOGRAM, CORONARY, WITH LEFT HEART CATHETERIZATION N/A 2022    Procedure: Angiogram, Coronary, with Left Heart Cath;  Surgeon: Jorge Tran MD;  Location: [facility] CATH/EP LAB;  Service: Cardiology;  Laterality: N/A;     SECTION      ECHOCARDIOGRAM,TRANSESOPHAGEAL N/A 2023    Procedure: Transesophageal echo (MARIANO) intra-procedure log documentation;  Surgeon: Provider, [facility];  Location: [facility] EP LAB;  Service: Cardiology;  Laterality: N/A;    JOINT REPLACEMENT      Knee    KNEE ARTHROPLASTY Right 2019    Procedure: ARTHROPLASTY, KNEE;  Surgeon: Delio Chung MD;  Location: [facility] OR;  Service: Orthopedics;  Laterality: Right;  anesthesia:  general and block    REPLACEMENT OF WOUND VACUUM-ASSISTED CLOSURE DEVICE Right 2019    Procedure: REPLACEMENT, WOUND VAC;  Surgeon: Delio Chung MD;  Location: [facility] OR;  Service: Orthopedics;  Laterality: Right;    TONSILLECTOMY      TREATMENT OF CARDIAC ARRHYTHMIA N/A 2023    Procedure: Cardioversion or  Defibrillation;  Surgeon: PJ Betancourt MD;  Location: [facility] EP LAB;  Service: Cardiology;  Laterality: N/A;  AF, MARIANO, DCCV, MAC, EH, 3 Prep    VENTRICULOGRAM, LEFT  12/23/2022    Procedure: Ventriculogram, Left;  Surgeon: Jorge Tran MD;  Location: [facility] CATH/EP LAB;  Service: Cardiology;;       Family History   Problem Relation Age of Onset    Alzheimer's disease Mother        Review of patient's allergies indicates:  No Known Allergies    Current Facility-Administered Medications on File Prior to Encounter   Medication Dose Route Frequency Provider Last Rate Last Admin    lidocaine (PF) 10 mg/ml (1%) injection 5 mg  0.5 mL Intradermal Once Azeem Anderson MD         Current Outpatient Medications on File Prior to Encounter   Medication Sig Dispense Refill    amiodarone (PACERONE) 200 MG Tab Take 1 tablet (200 mg total) by mouth once daily. 30 tablet 11    amLODIPine (NORVASC) 2.5 MG tablet Take 1 tablet (2.5 mg total) by mouth every evening. 90 tablet 3    apixaban (ELIQUIS) 5 mg Tab Take 1 tablet (5 mg total) by mouth 2 (two) times daily. 180 tablet 3    atorvastatin (LIPITOR) 10 MG tablet Take 1 tablet (10 mg total) by mouth every evening. 90 tablet 3    buPROPion (WELLBUTRIN SR) 150 MG TBSR 12 hr tablet Take 1 tablet (150 mg total) by mouth 2 (two) times daily. 180 tablet 3    ergocalciferol (ERGOCALCIFEROL) 50,000 unit Cap Take 50,000 Units by mouth every 7 days.      FLUoxetine 40 MG capsule Take 1 capsule (40 mg total) by mouth once daily. 90 capsule 3    gabapentin (NEURONTIN) 300 MG capsule Take 1 capsule (300 mg total) by mouth 3 (three) times daily. 270 capsule 1    HYDROcodone-acetaminophen (NORCO) 5-325 mg per tablet Take 1 tablet by mouth every 6 (six) hours as needed for Pain. 12 tablet 0    loratadine (CLARITIN) 10 mg tablet Take 10 mg by mouth once daily.      metoprolol succinate (TOPROL-XL) 25 MG 24 hr tablet Take 2 tablets (50 mg total) by mouth once daily. (Patient taking  "differently: Take 25 mg by mouth 2 (two) times daily.) 180 tablet 3    mupirocin (BACTROBAN) 2 % ointment Apply topically 2 (two) times daily. (Patient taking differently: Apply 1 g topically 2 (two) times daily.) 22 g 1    traZODone (DESYREL) 50 MG tablet Take 2 tablets (100 mg total) by mouth nightly as needed for Insomnia. 180 tablet 1    albuterol (PROVENTIL/VENTOLIN HFA) 90 mcg/actuation inhaler Inhale 2 puffs into the lungs every 6 (six) hours as needed for Wheezing. Rescue 18 g 2    betamethasone dipropionate 0.05 % cream Apply topically 2 (two) times daily. 45 g 2       ROS: As per HPI and below:  Review of systems not obtained due to patient factors.      Physical Exam:     Vitals:    24 1400 24 1415 24 1500 24 2049   BP: 135/68 119/79 107/77 107/77   BP Location:       Patient Position:       Pulse: 74 75 71 71   Resp: 20 19 19 18   Temp:   98.2 °F (36.8 °C)    TempSrc:   Oral    SpO2: 96% 95% (!) 93%    Weight:       Height:           BP  Min: 96/60  Max: 171/94  Temp  Av °F (36.7 °C)  Min: 97.7 °F (36.5 °C)  Max: 98.5 °F (36.9 °C)  Pulse  Av.2  Min: 71  Max: 94  Resp  Av.4  Min: 15  Max: 25  SpO2  Av.9 %  Min: 87 %  Max: 100 %  Height  Av' 1.5" (156.2 cm)  Min: 5' (152.4 cm)  Max: 5' 3" (160 cm)  Weight  Av.3 kg (247 lb 10.4 oz)  Min: 108.9 kg (240 lb)  Max: 115.8 kg (255 lb 4.8 oz)    Body mass index is 45.22 kg/m².          General:             Well developed, well nourished, no apparent distress  HEENT:              NCAT, no JVD, mucous membranes moist, EOM intact  Cardiovascular:  Regular rate and rhythm, normal S1, normal S2, No murmurs, rubs, or gallops  Respiratory:        Normal breath sounds, no wheezes, no rales, no rhonchi  Abdomen:           Bowel sounds present, non tender, non distended, no masses, no hepatojugular reflux  Extremities:        No clubbing, no cyanosis, no edema  Vascular:            2+ b/l radial.  Peripheral pulses " intact.  No carotid bruits.  Neurological:      No focal deficits  Skin:                   No obvious rashes or erythema, left knee surgical wound, forehead dried thick eschar            Lab Results   Component Value Date    WBC 6.56 02/05/2024    HGB 7.8 (L) 02/05/2024    HCT 26.5 (L) 02/05/2024    MCV 91 02/05/2024     02/05/2024     Lab Results   Component Value Date    CHOL 126 02/02/2024    CHOL 148 05/03/2023    CHOL 162 11/07/2022     Lab Results   Component Value Date    HDL 44 (L) 02/02/2024    HDL 53 05/03/2023    HDL 63 11/07/2022     Lab Results   Component Value Date    LDLCALC 67 02/02/2024    LDLCALC 79 05/03/2023    LDLCALC 82 11/07/2022     Lab Results   Component Value Date    TRIG 74 02/02/2024    TRIG 78 05/03/2023    TRIG 86 11/07/2022     Lab Results   Component Value Date    CHOLHDL 2.9 02/02/2024    CHOLHDL 2.8 05/03/2023    CHOLHDL 2.6 11/07/2022     CMP  Recent Labs   Lab 02/05/24  0441   GLU 90   CALCIUM 8.3*   ALBUMIN 3.5   PROT 5.8*   *   K 4.4   CO2 25      BUN 24*   CREATININE 2.1*   ALKPHOS 103   ALT 8*   AST 8*   BILITOT 0.5      Lab Results   Component Value Date    TSH 0.881 02/03/2024             Assessment and Recommendations       Diagnoses:    1.Hematoma of the left knee  2. Dried eschar of the forehead     Plan:  1. Surgery today for left knee washout  2. Dab betadine to the forehead eschar daily  3. Pt to FU at OP wound clinic on DC    Complexity:    moderate

## 2024-02-06 NOTE — SUBJECTIVE & OBJECTIVE
Interval History: Ms Mueller is S/P left knee debridement. We have added oxycodone for pain due to increased pain. Orthopedic input appreciated    Review of Systems   Constitutional:  Positive for activity change, diaphoresis and fatigue.   HENT: Negative.     Eyes: Negative.    Respiratory: Negative.     Cardiovascular: Negative.    Gastrointestinal: Negative.    Endocrine: Negative.    Genitourinary: Negative.    Musculoskeletal:  Positive for arthralgias, gait problem, joint swelling and myalgias.   Skin:  Positive for wound.   Allergic/Immunologic: Negative.    Neurological:  Positive for weakness.   Hematological: Negative.    Psychiatric/Behavioral:  The patient is nervous/anxious.      Objective:     Vital Signs (Most Recent):  Temp: 98.4 °F (36.9 °C) (02/06/24 1100)  Pulse: 83 (02/06/24 1100)  Resp: 18 (02/06/24 1100)  BP: (!) 142/91 (02/06/24 1100)  SpO2: (!) 94 % (02/06/24 1100) Vital Signs (24h Range):  Temp:  [98.2 °F (36.8 °C)-98.4 °F (36.9 °C)] 98.4 °F (36.9 °C)  Pulse:  [] 83  Resp:  [17-19] 18  SpO2:  [92 %-98 %] 94 %  BP: (107-161)/(77-91) 142/91     Weight: 115.8 kg (255 lb 4.8 oz)  Body mass index is 45.22 kg/m².    Intake/Output Summary (Last 24 hours) at 2/6/2024 1432  Last data filed at 2/6/2024 0415  Gross per 24 hour   Intake 460 ml   Output 800 ml   Net -340 ml         Physical Exam  Vitals and nursing note reviewed.   Constitutional:       General: She is not in acute distress.     Appearance: She is not ill-appearing.   HENT:      Head: Atraumatic.      Comments: Frontal laceration with crusting-no erythema or exudate     Nose: Nose normal.   Eyes:      Extraocular Movements: Extraocular movements intact.      Pupils: Pupils are equal, round, and reactive to light.   Cardiovascular:      Rate and Rhythm: Normal rate and regular rhythm.   Pulmonary:      Effort: Pulmonary effort is normal.      Breath sounds: Normal breath sounds.   Abdominal:      General: Bowel sounds are normal.    Musculoskeletal:         General: Normal range of motion.      Cervical back: Normal range of motion and neck supple.   Skin:     General: Skin is warm.   Neurological:      Mental Status: She is alert and oriented to person, place, and time.   Psychiatric:         Mood and Affect: Mood normal.             Significant Labs: All pertinent labs within the past 24 hours have been reviewed.  Recent Lab Results         02/06/24  1010   02/06/24  0433        Albumin   3.2       ALP   93       ALT   7       Anion Gap   6       AST   9       Baso #   0.03       Basophil %   0.4       BILIRUBIN TOTAL   0.4  Comment: For infants and newborns, interpretation of results should be based  on gestational age, weight and in agreement with clinical  observations.    Premature Infant recommended reference ranges:  Up to 24 hours.............<8.0 mg/dL  Up to 48 hours............<12.0 mg/dL  3-5 days..................<15.0 mg/dL  6-29 days.................<15.0 mg/dL         BUN   22       Calcium   8.2       Chloride   103       CO2   25       Creatinine   2.0       Differential Method   Automated       eGFR   26.4       Eos #   0.1       Eos %   1.9       Glucose   90       Gran # (ANC)   6.2       Gran %   83.6       Hematocrit   26.5       Hemoglobin   7.6       Immature Grans (Abs)   0.04  Comment: Mild elevation in immature granulocytes is non specific and   can be seen in a variety of conditions including stress response,   acute inflammation, trauma and pregnancy. Correlation with other   laboratory and clinical findings is essential.         Immature Granulocytes   0.5       Lymph #   0.5       Lymph %   7.0       MCH   26.8       MCHC   28.7       MCV   93       Mono #   0.5       Mono %   6.6       MPV   9.2       nRBC   0       Platelet Count   384       Potassium   4.9       PROTEIN TOTAL   5.5       RBC   2.84       RDW   17.2       Sodium   134       Vancomycin, Random 12.2         WBC   7.41               Significant  Imaging: I have reviewed all pertinent imaging results/findings within the past 24 hours.

## 2024-02-06 NOTE — TELEPHONE ENCOUNTER
Spoke with patient and states she needs refills on gabapentin, hydrocodone every eight hours. Preferred pharmacy [facility] [place] [facility]   Patient currently in hospital after busting her head she states.   Patient will need to contact after getting out the hospital and will complete hospital follow up. Patient verbalized understanding.

## 2024-02-06 NOTE — PLAN OF CARE
Problem: Occupational Therapy  Goal: Occupational Therapy Goal  Description: Goals to be met by: 3/4/2024     Patient will increase functional independence with ADLs by performing:    UE Dressing with Supervision.  LE Dressing with Supervision.  Grooming while standing at sink with Supervision.  Toileting from toilet with Supervision for hygiene and clothing management.   Bathing from  shower chair/bench with Stand-by Assistance.  Toilet transfer to toilet with Supervision.    Outcome: Ongoing, Progressing

## 2024-02-06 NOTE — PROGRESS NOTES
Pharmacokinetic Assessment Follow Up: IV Vancomycin    Vancomycin serum concentration assessment(s):    The random level was drawn correctly and can be used to guide therapy at this time. The measurement is within the desired definitive target range of 10 to 15 mcg/mL.    Vancomycin Regimen Plan:    Give vancomycin 500 mg x 1 dose and Re-dose when the random level is less than 15 mcg/mL, next level to be drawn at 1100 on 2/7/24    Drug levels (last 3 results):  Recent Labs   Lab Result Units 02/05/24 0441 02/06/24  1010   Vancomycin, Random ug/mL 14.4 12.2       Pharmacy will continue to follow and monitor vancomycin.    Please contact pharmacy at extension [phone] for questions regarding this assessment.    Thank you for the consult,   Jose Roberto Fenton       Patient brief summary:  Iris Mueller is a 68 y.o. female initiated on antimicrobial therapy with IV Vancomycin for treatment of skin & soft tissue infection    The patient's current regimen is intermittent dosing based on random levels    Drug Allergies:   Review of patient's allergies indicates:  No Known Allergies    Actual Body Weight:   115.8 kg    Renal Function:   Estimated Creatinine Clearance: 32.1 mL/min (A) (based on SCr of 2 mg/dL (H)).,     Dialysis Method (if applicable):  N/A    CBC (last 72 hours):  Recent Labs   Lab Result Units 02/03/24  1558 02/04/24  0138 02/05/24 0442 02/06/24  0433   WBC K/uL 6.29 6.60 6.56 7.41   Hemoglobin g/dL 6.8* 8.1* 7.8* 7.6*   Hematocrit % 23.7* 27.3* 26.5* 26.5*   Platelets K/uL 387 408 419 384   Gran % % 78.5* 84.5* 77.7* 83.6*   Lymph % % 9.1* 8.2* 10.7* 7.0*   Mono % % 8.6 4.4 7.6 6.6   Eosinophil % % 2.5 2.0 2.7 1.9   Basophil % % 0.3 0.3 0.5 0.4   Differential Method  Automated Automated Automated Automated       Metabolic Panel (last 72 hours):  Recent Labs   Lab Result Units 02/03/24  1558 02/04/24  0138 02/05/24 0441 02/06/24  0433   Sodium mmol/L 135* 135* 134* 134*   Potassium mmol/L 4.2 4.1 4.4  4.9   Chloride mmol/L 104 103 103 103   CO2 mmol/L 25 25 25 25   Glucose mg/dL 85 103 90 90   BUN mg/dL 22 21 24* 22   Creatinine mg/dL 2.0* 2.0* 2.1* 2.0*   Albumin g/dL 3.4* 3.6 3.5 3.2*   Total Bilirubin mg/dL 0.4 0.5 0.5 0.4   Alkaline Phosphatase U/L 106 108 103 93   AST U/L 10 10 8* 9*   ALT U/L 8* 9* 8* 7*   Magnesium mg/dL 2.0  2.0  --   --   --        Vancomycin Administrations:  vancomycin given in the last 96 hours                     vancomycin 1 g in dextrose 5% 250 mL IVPB (mg) 500 mg New Bag 02/05/24 1231    vancomycin 500 mg in dextrose 5 % 100 mL IVPB (ready to mix) ()  Restarted 02/05/24 1210    vancomycin in dextrose 5 % 1 gram/250 mL IVPB 2,000 mg (mg) 2,000 mg New Bag 02/04/24 0152                    Microbiologic Results:  Microbiology Results (last 7 days)       Procedure Component Value Units Date/Time    Blood culture #1 **CANNOT BE ORDERED STAT** [[id number]] Collected: 02/04/24 0129    Order Status: Completed Specimen: Blood Updated: 02/06/24 0432     Blood Culture, Routine No Growth to date      No Growth to date      No Growth to date    Narrative:      Collection has been rescheduled by AOC at 02/03/2024 23:03 Reason:   Patient unavailable pt is in MRI  Collection has been rescheduled by AOC at 02/03/2024 23:03 Reason:   Patient unavailable pt is in MRI    Blood culture #2 **CANNOT BE ORDERED STAT** [[id number]] Collected: 02/04/24 0138    Order Status: Completed Specimen: Blood Updated: 02/06/24 0432     Blood Culture, Routine No Growth to date      No Growth to date      No Growth to date    Narrative:      Collection has been rescheduled by AOC at 02/03/2024 23:03 Reason:   Patient unavailable pt is in MRI  Collection has been rescheduled by AOC at 02/03/2024 23:03 Reason:   Patient unavailable pt is in MRI    Blood culture #1 **CANNOT BE ORDERED STAT** [[id number]]     Order Status: Canceled Specimen: Blood     Blood culture #2 **CANNOT BE ORDERED STAT** [[id number]]     Order  Status: Canceled Specimen: Blood

## 2024-02-06 NOTE — ASSESSMENT & PLAN NOTE
Pt has a full thickness necrotic area left knee which Dr Marie will debride tomorrow  Continue ceftriaxone and vancomycin    S/P debridement with a wound vac in place   Continuing antibiotics

## 2024-02-06 NOTE — PT/OT/SLP PROGRESS
Occupational Therapy   Treatment    Name: Iris Mueller  MRN: [id number]  Admitting Diagnosis:  Acute blood loss anemia  1 Day Post-Op    Recommendations:     Discharge Recommendations: Low Intensity Therapy  Discharge Equipment Recommendations:   (TBD)  Barriers to discharge:       Assessment:     Iris Mueller is a 68 y.o. female with a medical diagnosis of Acute blood loss anemia.  Pt agreeable to OT therapy session this AM. Performance deficits affecting function are weakness, impaired endurance, impaired self care skills, impaired functional mobility, gait instability, impaired balance, impaired cognition, decreased coordination, decreased upper extremity function, decreased lower extremity function, decreased safety awareness, decreased ROM, impaired skin, impaired cardiopulmonary response to activity. Pt declined EOB/OOB activity until WB status confirmed with MD. After session, MD confirmed pt is WBAT with knee immobilizer in place.     Rehab Prognosis:  Fair; patient would benefit from acute skilled OT services to address these deficits and reach maximum level of function.       Plan:     Patient to be seen 5 x/week to address the above listed problems via self-care/home management, therapeutic activities, therapeutic exercises  Plan of Care Expires:    Plan of Care Reviewed with: patient, spouse    Subjective     Chief Complaint: none stated  Patient/Family Comments/goals: none stated  Pain/Comfort:  Pain Rating 1: 0/10    Objective:     Communicated with: nursing prior to session.  Patient found HOB elevated with telemetry, peripheral IV, oxygen, wound vac, bed alarm upon OT entry to room.    General Precautions: Standard, fall    Orthopedic Precautions:LLE weight bearing as tolerated  Braces: Knee immobilizer  Respiratory Status: Nasal cannula, flow 4 L/min     Occupational Performance:     Bed Mobility:    declined    Functional Mobility/Transfers:  declined    Activities of Daily  Living:  Grooming: setup assistance bed level to brush teeth and to wash face      Treatment & Education:  Pt educated on role of OT/POC, importance of OOB/EOB activity, use of call bell, and safety during ADLs, transfers, and functional mobility.    Patient left HOB elevated with all lines intact, call button in reach, bed alarm on, and nursing and MD notified    GOALS:     Multidisciplinary Problems       Occupational Therapy Goals          Problem: Occupational Therapy    Goal Priority Disciplines Outcome Interventions   Occupational Therapy Goal     OT, PT/OT Ongoing, Progressing    Description: Goals to be met by: 3/4/2024     Patient will increase functional independence with ADLs by performing:    UE Dressing with Supervision.  LE Dressing with Supervision.  Grooming while standing at sink with Supervision.  Toileting from toilet with Supervision for hygiene and clothing management.   Bathing from  shower chair/bench with Stand-by Assistance.  Toilet transfer to toilet with Supervision.                         Time Tracking:     OT Date of Treatment: 02/06/24  OT Start Time: 1051  OT Stop Time: 1106  OT Total Time (min): 15 min    Billable Minutes:Self Care/Home Management 15    OT/EMI: OT          2/6/2024

## 2024-02-07 LAB
ABO + RH BLD: NORMAL
ALBUMIN SERPL BCP-MCNC: 3 G/DL (ref 3.5–5.2)
ALP SERPL-CCNC: 88 U/L (ref 55–135)
ALT SERPL W/O P-5'-P-CCNC: 6 U/L (ref 10–44)
ANION GAP SERPL CALC-SCNC: 2 MMOL/L (ref 8–16)
AST SERPL-CCNC: 8 U/L (ref 10–40)
BASOPHILS # BLD AUTO: 0.03 K/UL (ref 0–0.2)
BASOPHILS NFR BLD: 0.5 % (ref 0–1.9)
BILIRUB SERPL-MCNC: 0.3 MG/DL (ref 0.1–1)
BLD GP AB SCN CELLS X3 SERPL QL: NORMAL
BLD PROD TYP BPU: NORMAL
BLOOD UNIT EXPIRATION DATE: NORMAL
BLOOD UNIT TYPE CODE: 1700
BLOOD UNIT TYPE: NORMAL
BUN SERPL-MCNC: 22 MG/DL (ref 8–23)
CALCIUM SERPL-MCNC: 8.2 MG/DL (ref 8.7–10.5)
CHLORIDE SERPL-SCNC: 103 MMOL/L (ref 95–110)
CO2 SERPL-SCNC: 28 MMOL/L (ref 23–29)
CODING SYSTEM: NORMAL
CREAT SERPL-MCNC: 2.1 MG/DL (ref 0.5–1.4)
CROSSMATCH INTERPRETATION: NORMAL
DIFFERENTIAL METHOD BLD: ABNORMAL
DISPENSE STATUS: NORMAL
EOSINOPHIL # BLD AUTO: 0.2 K/UL (ref 0–0.5)
EOSINOPHIL NFR BLD: 2.9 % (ref 0–8)
ERYTHROCYTE [DISTWIDTH] IN BLOOD BY AUTOMATED COUNT: 17.2 % (ref 11.5–14.5)
ERYTHROCYTE [DISTWIDTH] IN BLOOD BY AUTOMATED COUNT: 17.2 % (ref 11.5–14.5)
EST. GFR  (NO RACE VARIABLE): 24.9 ML/MIN/1.73 M^2
GLUCOSE SERPL-MCNC: 135 MG/DL (ref 70–110)
GLUCOSE SERPL-MCNC: 85 MG/DL (ref 70–110)
GLUCOSE SERPL-MCNC: 98 MG/DL (ref 70–110)
HCT VFR BLD AUTO: 23.7 % (ref 37–48.5)
HCT VFR BLD AUTO: 23.7 % (ref 37–48.5)
HGB BLD-MCNC: 6.9 G/DL (ref 12–16)
HGB BLD-MCNC: 7 G/DL (ref 12–16)
IMM GRANULOCYTES # BLD AUTO: 0.02 K/UL (ref 0–0.04)
IMM GRANULOCYTES NFR BLD AUTO: 0.3 % (ref 0–0.5)
IRON SERPL-MCNC: 19 UG/DL (ref 30–160)
LYMPHOCYTES # BLD AUTO: 0.7 K/UL (ref 1–4.8)
LYMPHOCYTES NFR BLD: 11.8 % (ref 18–48)
MCH RBC QN AUTO: 27.5 PG (ref 27–31)
MCH RBC QN AUTO: 28 PG (ref 27–31)
MCHC RBC AUTO-ENTMCNC: 29.1 G/DL (ref 32–36)
MCHC RBC AUTO-ENTMCNC: 29.5 G/DL (ref 32–36)
MCV RBC AUTO: 94 FL (ref 82–98)
MCV RBC AUTO: 95 FL (ref 82–98)
MONOCYTES # BLD AUTO: 0.6 K/UL (ref 0.3–1)
MONOCYTES NFR BLD: 9.7 % (ref 4–15)
NEUTROPHILS # BLD AUTO: 4.4 K/UL (ref 1.8–7.7)
NEUTROPHILS NFR BLD: 74.8 % (ref 38–73)
NRBC BLD-RTO: 0 /100 WBC
NUM UNITS TRANS PACKED RBC: NORMAL
PLATELET # BLD AUTO: 313 K/UL (ref 150–450)
PLATELET # BLD AUTO: 326 K/UL (ref 150–450)
PMV BLD AUTO: 9 FL (ref 9.2–12.9)
PMV BLD AUTO: 9.2 FL (ref 9.2–12.9)
POTASSIUM SERPL-SCNC: 4.7 MMOL/L (ref 3.5–5.1)
PROT SERPL-MCNC: 5.2 G/DL (ref 6–8.4)
RBC # BLD AUTO: 2.5 M/UL (ref 4–5.4)
RBC # BLD AUTO: 2.51 M/UL (ref 4–5.4)
SATURATED IRON: 4 % (ref 20–50)
SODIUM SERPL-SCNC: 133 MMOL/L (ref 136–145)
SPECIMEN OUTDATE: NORMAL
TOTAL IRON BINDING CAPACITY: 486 UG/DL (ref 250–450)
TRANSFERRIN SERPL-MCNC: 347 MG/DL (ref 200–375)
VANCOMYCIN SERPL-MCNC: 13.1 UG/ML
WBC # BLD AUTO: 5.87 K/UL (ref 3.9–12.7)
WBC # BLD AUTO: 6.15 K/UL (ref 3.9–12.7)

## 2024-02-07 PROCEDURE — 99900035 HC TECH TIME PER 15 MIN (STAT)

## 2024-02-07 PROCEDURE — P9016 RBC LEUKOCYTES REDUCED: HCPCS | Performed by: STUDENT IN AN ORGANIZED HEALTH CARE EDUCATION/TRAINING PROGRAM

## 2024-02-07 PROCEDURE — 85027 COMPLETE CBC AUTOMATED: CPT | Performed by: INTERNAL MEDICINE

## 2024-02-07 PROCEDURE — 25000003 PHARM REV CODE 250: Performed by: INTERNAL MEDICINE

## 2024-02-07 PROCEDURE — 63600175 PHARM REV CODE 636 W HCPCS: Performed by: ORTHOPAEDIC SURGERY

## 2024-02-07 PROCEDURE — 80053 COMPREHEN METABOLIC PANEL: CPT | Performed by: ORTHOPAEDIC SURGERY

## 2024-02-07 PROCEDURE — 94761 N-INVAS EAR/PLS OXIMETRY MLT: CPT

## 2024-02-07 PROCEDURE — 63600175 PHARM REV CODE 636 W HCPCS: Performed by: INTERNAL MEDICINE

## 2024-02-07 PROCEDURE — 27000221 HC OXYGEN, UP TO 24 HOURS

## 2024-02-07 PROCEDURE — 25000003 PHARM REV CODE 250: Performed by: ORTHOPAEDIC SURGERY

## 2024-02-07 PROCEDURE — 86920 COMPATIBILITY TEST SPIN: CPT | Performed by: STUDENT IN AN ORGANIZED HEALTH CARE EDUCATION/TRAINING PROGRAM

## 2024-02-07 PROCEDURE — 80202 ASSAY OF VANCOMYCIN: CPT | Performed by: INTERNAL MEDICINE

## 2024-02-07 PROCEDURE — 94760 N-INVAS EAR/PLS OXIMETRY 1: CPT

## 2024-02-07 PROCEDURE — 85025 COMPLETE CBC W/AUTO DIFF WBC: CPT | Performed by: ORTHOPAEDIC SURGERY

## 2024-02-07 PROCEDURE — 86850 RBC ANTIBODY SCREEN: CPT | Performed by: STUDENT IN AN ORGANIZED HEALTH CARE EDUCATION/TRAINING PROGRAM

## 2024-02-07 PROCEDURE — 21400001 HC TELEMETRY ROOM

## 2024-02-07 PROCEDURE — S4991 NICOTINE PATCH NONLEGEND: HCPCS | Performed by: INTERNAL MEDICINE

## 2024-02-07 PROCEDURE — 97530 THERAPEUTIC ACTIVITIES: CPT | Mod: CQ

## 2024-02-07 PROCEDURE — 83540 ASSAY OF IRON: CPT | Performed by: INTERNAL MEDICINE

## 2024-02-07 RX ORDER — HYDROCODONE BITARTRATE AND ACETAMINOPHEN 500; 5 MG/1; MG/1
TABLET ORAL
Status: DISCONTINUED | OUTPATIENT
Start: 2024-02-07 | End: 2024-02-12 | Stop reason: HOSPADM

## 2024-02-07 RX ADMIN — ATORVASTATIN CALCIUM 10 MG: 10 TABLET, FILM COATED ORAL at 09:02

## 2024-02-07 RX ADMIN — VANCOMYCIN HYDROCHLORIDE 500 MG: 500 INJECTION, POWDER, LYOPHILIZED, FOR SOLUTION INTRAVENOUS at 06:02

## 2024-02-07 RX ADMIN — MUPIROCIN 1 G: 20 OINTMENT TOPICAL at 09:02

## 2024-02-07 RX ADMIN — CEFTRIAXONE SODIUM 1 G: 1 INJECTION, POWDER, FOR SOLUTION INTRAMUSCULAR; INTRAVENOUS at 09:02

## 2024-02-07 RX ADMIN — CETIRIZINE HYDROCHLORIDE 10 MG: 10 TABLET, FILM COATED ORAL at 09:02

## 2024-02-07 RX ADMIN — OXYCODONE HYDROCHLORIDE 5 MG: 5 TABLET ORAL at 09:02

## 2024-02-07 RX ADMIN — BUPROPION HYDROCHLORIDE 150 MG: 150 TABLET, EXTENDED RELEASE ORAL at 09:02

## 2024-02-07 RX ADMIN — METOPROLOL SUCCINATE 25 MG: 25 TABLET, EXTENDED RELEASE ORAL at 09:02

## 2024-02-07 RX ADMIN — OXYCODONE HYDROCHLORIDE 5 MG: 5 TABLET ORAL at 04:02

## 2024-02-07 RX ADMIN — TRAZODONE HYDROCHLORIDE 100 MG: 50 TABLET ORAL at 09:02

## 2024-02-07 RX ADMIN — FLUOXETINE HYDROCHLORIDE 40 MG: 20 CAPSULE ORAL at 09:02

## 2024-02-07 RX ADMIN — GABAPENTIN 300 MG: 300 CAPSULE ORAL at 09:02

## 2024-02-07 RX ADMIN — AMIODARONE HYDROCHLORIDE 200 MG: 200 TABLET ORAL at 09:02

## 2024-02-07 RX ADMIN — NICOTINE 1 PATCH: 21 PATCH, EXTENDED RELEASE TRANSDERMAL at 09:02

## 2024-02-07 RX ADMIN — AMLODIPINE BESYLATE 2.5 MG: 2.5 TABLET ORAL at 09:02

## 2024-02-07 NOTE — PT/OT/SLP PROGRESS
Physical Therapy Treatment    Patient Name:  Iris Mueller   MRN:  [id number]    Recommendations:     Discharge Recommendations: Low Intensity Therapy  Discharge Equipment Recommendations: none  Barriers to discharge:   increased assist with mobility, decreased activity tolerance, pain, orthopedic precautions, decreased safety awareness    Assessment:     Iris Mueller is a 68 y.o. female admitted with a medical diagnosis of Acute blood loss anemia.  She presents with the following impairments/functional limitations: weakness, impaired endurance, impaired self care skills, impaired functional mobility, gait instability, impaired balance, impaired cognition, decreased coordination, decreased upper extremity function, decreased lower extremity function, decreased safety awareness, decreased ROM, impaired skin, impaired cardiopulmonary response to activity.    Spoke with extender before entering room. Extender reports that pt was able to get out of bed and ambulate to bathroom with assist. Pt performed hygiene and was able to wash herself. Extender left pt up in chair with family present.    Pt up in chair and agreeable to visit. Pt seems to be more clear today, some confusion lingering but improved from yesterday.    Pt required mod assist x 2 for sit to stand transfer from low chair with rollator. Pt with difficulty due to not being able to bend L knee as she is in a knee immobilizer. Pt performed three stand trials with limited tolerance due to fatigue. On last trial, pt knees threatening to buckle.    Pt reports feeling too weak to attempt to ambulate. Pt awaiting blood transfusion.    Pt left up in chair with family and RT present.    Rehab Prognosis: Fair; patient would benefit from acute skilled PT services to address these deficits and reach maximum level of function.    Recent Surgery: Procedure(s) (LRB):  INCISION AND DRAINAGE, HEMATOMA (Left) 2 Days Post-Op    Plan:     During this  hospitalization, patient to be seen 6 x/week to address the identified rehab impairments via gait training, therapeutic activities, therapeutic exercises, neuromuscular re-education and progress toward the following goals:    Plan of Care Expires:  24    Subjective     Chief Complaint: feeling weak today  Patient/Family Comments/goals: to get stronger  Pain/Comfort:  Pain Rating 1: other (see comments) (not rated)  Location - Side 1: Left  Location 1: knee  Pain Addressed 1: Reposition, Distraction, Pre-medicate for activity, Cessation of Activity  Pain Rating Post-Intervention 1: other (see comments) (not rated)      Objective:     Communicated with RN prior to session.  Patient found up in chair with telemetry, peripheral IV, oxygen, wound vac upon PT entry to room.     General Precautions: Standard, fall  Orthopedic Precautions: LLE weight bearing as tolerated  Braces: Knee immobilizer  Respiratory Status: Nasal cannula, flow 3 L/min     Functional Mobility:  Transfers:     Sit to Stand:  moderate assistance and of 2 persons with rollator  Balance: Damaso x 1-2 for standing balance at rollator w/ episode of knees threatening to buckle requiring modA x2      AM-PAC 6 CLICK MOBILITY          Treatment & Education:  Pt educated on importance of time OOB, importance of intermittent mobility, safe techniques for transfers/ambulation, discharge recommendations/options, and use of call light for assistance and fall prevention.      Patient left up in chair with all lines intact, call button in reach, RN notified, and family and RT present..    GOALS:   Multidisciplinary Problems       Physical Therapy Goals          Problem: Physical Therapy    Goal Priority Disciplines Outcome Goal Variances Interventions   Physical Therapy Goal     PT, PT/OT Ongoing, Progressing     Description: Goals to be met by: 24     Patient will increase functional independence with mobility by performin. Supine to sit with  Harding  2. Sit to stand transfer with Modified Harding  3. Gait  x 75 feet with Modified Harding using Rolling Walker.                          Time Tracking:     PT Received On: 02/07/24  PT Start Time: 1044     PT Stop Time: 1117  PT Total Time (min): 33 min     Billable Minutes: Therapeutic Activity 33    Treatment Type: Treatment  PT/PTA: PTA     Number of PTA visits since last PT visit: 2     02/07/2024

## 2024-02-07 NOTE — PROGRESS NOTES
Pharmacokinetic Assessment Follow Up: IV Vancomycin    Vancomycin serum concentration assessment(s):    The random level was drawn correctly and can be used to guide therapy at this time. The measurement is within the desired definitive target range of 10 to 15 mcg/mL.    Vancomycin Regimen Plan:    Change regimen to Vancomycin 500 mg IV every 24 hours with next serum trough concentration measured at 1100 prior to 7th dose on 2/10/24    Drug levels (last 3 results):  Recent Labs   Lab Result Units 02/05/24  0441 02/06/24  1010 02/07/24  1038   Vancomycin, Random ug/mL 14.4 12.2 13.1       Pharmacy will continue to follow and monitor vancomycin.    Please contact pharmacy at extension [phone] for questions regarding this assessment.    Thank you for the consult,   Jose Roberto Fenton       Patient brief summary:  Iris Mueller is a 68 y.o. female initiated on antimicrobial therapy with IV Vancomycin for treatment of skin & soft tissue infection    The patient's current regimen is intermittent dosing based on random levels    Drug Allergies:   Review of patient's allergies indicates:  No Known Allergies    Actual Body Weight:   115.8 kg    Renal Function:   Estimated Creatinine Clearance: 30.6 mL/min (A) (based on SCr of 2.1 mg/dL (H)).,     Dialysis Method (if applicable):  N/A    CBC (last 72 hours):  Recent Labs   Lab Result Units 02/05/24 0442 02/06/24  0433 02/07/24  0518 02/07/24  0733   WBC K/uL 6.56 7.41 5.87 6.15   Hemoglobin g/dL 7.8* 7.6* 6.9* 7.0*   Hematocrit % 26.5* 26.5* 23.7* 23.7*   Platelets K/uL 419 384 326 313   Gran % % 77.7* 83.6* 74.8*  --    Lymph % % 10.7* 7.0* 11.8*  --    Mono % % 7.6 6.6 9.7  --    Eosinophil % % 2.7 1.9 2.9  --    Basophil % % 0.5 0.4 0.5  --    Differential Method  Automated Automated Automated  --        Metabolic Panel (last 72 hours):  Recent Labs   Lab Result Units 02/05/24  0441 02/06/24  0433 02/07/24  0518   Sodium mmol/L 134* 134* 133*   Potassium mmol/L 4.4 4.9  4.7   Chloride mmol/L 103 103 103   CO2 mmol/L 25 25 28   Glucose mg/dL 90 90 85   BUN mg/dL 24* 22 22   Creatinine mg/dL 2.1* 2.0* 2.1*   Albumin g/dL 3.5 3.2* 3.0*   Total Bilirubin mg/dL 0.5 0.4 0.3   Alkaline Phosphatase U/L 103 93 88   AST U/L 8* 9* 8*   ALT U/L 8* 7* 6*       Vancomycin Administrations:  vancomycin given in the last 96 hours                     vancomycin 500 mg in dextrose 5 % 100 mL IVPB (ready to mix) (mg) 500 mg New Bag 02/06/24 1159    vancomycin 1 g in dextrose 5% 250 mL IVPB (mg) 500 mg New Bag 02/05/24 1231    vancomycin 500 mg in dextrose 5 % 100 mL IVPB (ready to mix) ()  Restarted 02/05/24 1210    vancomycin in dextrose 5 % 1 gram/250 mL IVPB 2,000 mg (mg) 2,000 mg New Bag 02/04/24 0152                    Microbiologic Results:  Microbiology Results (last 7 days)       Procedure Component Value Units Date/Time    Blood culture #1 **CANNOT BE ORDERED STAT** [[id number]] Collected: 02/04/24 0129    Order Status: Completed Specimen: Blood Updated: 02/07/24 0432     Blood Culture, Routine No Growth to date      No Growth to date      No Growth to date      No Growth to date    Narrative:      Collection has been rescheduled by AOC at 02/03/2024 23:03 Reason:   Patient unavailable pt is in MRI  Collection has been rescheduled by AOC at 02/03/2024 23:03 Reason:   Patient unavailable pt is in MRI    Blood culture #2 **CANNOT BE ORDERED STAT** [[id number]] Collected: 02/04/24 0138    Order Status: Completed Specimen: Blood Updated: 02/07/24 0432     Blood Culture, Routine No Growth to date      No Growth to date      No Growth to date      No Growth to date    Narrative:      Collection has been rescheduled by AOC at 02/03/2024 23:03 Reason:   Patient unavailable pt is in MRI  Collection has been rescheduled by AOC at 02/03/2024 23:03 Reason:   Patient unavailable pt is in MRI    Blood culture #1 **CANNOT BE ORDERED STAT** [[id number]]     Order Status: Canceled Specimen: Blood     Blood  culture #2 **CANNOT BE ORDERED STAT** [[id number]]     Order Status: Canceled Specimen: Blood

## 2024-02-07 NOTE — PROGRESS NOTES
[facility]  Progress Note    Patient Name: Iris Mueller  MRN: [id number]  Patient Class: IP- Inpatient   Admission Date: 2/3/2024  Length of Stay: 4 days  Attending Physician: Ramiro Damon MD  Primary Care Provider: Elkin You MD        Subjective:     Principal Problem:Acute blood loss anemia        HPI:  No notes on file    Overview/Hospital Course:  2/4/2024  Ms Mueller has difficulty with left knee pain  6/10 and difficulty walking due to left knee pain /dysfunction  She has no other complaints    2/5/2024  Ms Mueller is is somnolent this AM without any new complaints  She is anxious to have her left knee surgery    2/6/2026  Pt is feeling better . She has 4-8/10 left knee pain and is upset because the wound vac is making noise    2/7/2024  Ms Mueller feels ok at rest but has significant VALERIO.     Interval History: Ms Mueller notes VALERIO. She has had a recent surgery with wound vac in place  One blood count of 6.9 will transfuse 1 unit prbc's  I will order an iron and TIBC and administer iron for Fe deficiency    Review of Systems   Constitutional:  Positive for activity change, diaphoresis and fatigue. Negative for chills and fever.   HENT: Negative.     Eyes: Negative.    Respiratory:          VALERIO   Cardiovascular:  Positive for palpitations.   Gastrointestinal:  Positive for abdominal distention. Negative for abdominal pain.   Endocrine: Positive for cold intolerance.   Genitourinary:  Positive for difficulty urinating.   Musculoskeletal:  Positive for arthralgias, gait problem and myalgias.   Skin:  Positive for wound.   Allergic/Immunologic: Negative.    Neurological:  Positive for weakness.   Hematological:  Bruises/bleeds easily.   Psychiatric/Behavioral:  The patient is nervous/anxious.      Objective:     Vital Signs (Most Recent):  Temp: 97.9 °F (36.6 °C) (02/07/24 0841)  Pulse: 77 (02/07/24 0841)  Resp: 16 (02/07/24 0911)  BP: 121/60  (02/07/24 0841)  SpO2: 99 % (02/07/24 0841) Vital Signs (24h Range):  Temp:  [97.7 °F (36.5 °C)-98.8 °F (37.1 °C)] 97.9 °F (36.6 °C)  Pulse:  [74-91] 77  Resp:  [16-18] 16  SpO2:  [94 %-100 %] 99 %  BP: (111-142)/(60-91) 121/60     Weight: 115.8 kg (255 lb 4.8 oz)  Body mass index is 45.22 kg/m².    Intake/Output Summary (Last 24 hours) at 2/7/2024 1021  Last data filed at 2/7/2024 0417  Gross per 24 hour   Intake 780 ml   Output 1300 ml   Net -520 ml         Physical Exam  Vitals and nursing note reviewed.   Constitutional:       General: She is not in acute distress.  HENT:      Head: Normocephalic and atraumatic.      Nose: Nose normal.      Mouth/Throat:      Mouth: Mucous membranes are moist.   Eyes:      Extraocular Movements: Extraocular movements intact.      Pupils: Pupils are equal, round, and reactive to light.      Comments: Pale conjunctiva   Cardiovascular:      Rate and Rhythm: Normal rate and regular rhythm.      Heart sounds:      Gallop present.   Pulmonary:      Breath sounds: Rales present.   Abdominal:      General: There is distension.      Tenderness: There is no abdominal tenderness. There is no guarding or rebound.   Musculoskeletal:      Cervical back: Normal range of motion and neck supple.      Comments: Left leg in a brace with wound vac   Skin:     General: Skin is warm.   Neurological:      Mental Status: She is alert and oriented to person, place, and time.   Psychiatric:      Comments: Mildly dysphoric mood             Significant Labs: All pertinent labs within the past 24 hours have been reviewed.  Recent Lab Results         02/07/24  0834   02/07/24  0733   02/07/24  0518        Unit Blood Type Code   1700  [P]         Unit Expiration   [id number]  [P]         Unit Blood Type   B NEG  [P]         Albumin     3.0       ALP     88       ALT     6       Anion Gap     2       AST     8       Baso #     0.03       Basophil %     0.5       BILIRUBIN TOTAL     0.3  Comment: For infants  and newborns, interpretation of results should be based  on gestational age, weight and in agreement with clinical  observations.    Premature Infant recommended reference ranges:  Up to 24 hours.............<8.0 mg/dL  Up to 48 hours............<12.0 mg/dL  3-5 days..................<15.0 mg/dL  6-29 days.................<15.0 mg/dL         BUN     22       Calcium     8.2       Chloride     103       CO2     28       CODING SYSTEM   [id number]  [P]         Creatinine     2.1       Crossmatch Interpretation   Compatible  [P]         Differential Method     Automated       DISPENSE STATUS   CROSSMATCHED  [P]         eGFR     24.9       Eos #     0.2       Eos %     2.9       Glucose     85       Gran # (ANC)     4.4       Gran %     74.8       Group & Rh   B NEG         Hematocrit   23.7   23.7       Hemoglobin   7.0   6.9  Comment: Results confirmed, test repeated  HGB critical result(s) repeated. Called and verbal readback obtained   from Moises Ponce RN by JRSolange 02/07/2024 06:10         Immature Grans (Abs)     0.02  Comment: Mild elevation in immature granulocytes is non specific and   can be seen in a variety of conditions including stress response,   acute inflammation, trauma and pregnancy. Correlation with other   laboratory and clinical findings is essential.         Immature Granulocytes     0.3       INDIRECT KAYLIE   NEG         Lymph #     0.7       Lymph %     11.8       MCH   28.0   27.5       MCHC   29.5   29.1       MCV   95   94       Mono #     0.6       Mono %     9.7       MPV   9.0   9.2       nRBC     0       Platelet Count   313   326       POC Glucose 98           Potassium     4.7       Product Code   U4547O20  [P]         PROTEIN TOTAL     5.2       RBC   2.50   2.51       RDW   17.2   17.2       Sodium     133       Specimen Outdate   02/10/2024 23:59         UNIT NUMBER   [id number]  [P]         WBC   6.15   5.87                [P] - Preliminary Result               Significant Imaging: I  have reviewed all pertinent imaging results/findings within the past 24 hours.    Assessment/Plan:      * Acute blood loss anemia  Patient's anemia is currently uncontrolled. Has received 2 units of PRBCs on 2/7/2024 . Etiology likely d/t acute blood loss which was from wound  Current CBC reviewed-   Lab Results   Component Value Date    HGB 7.0 (L) 02/07/2024    HCT 23.7 (L) 02/07/2024     Monitor serial CBC and transfuse if patient becomes hemodynamically unstable, symptomatic or H/H drops below 7/21.    Transfusing 1 unit of packed cells for symptoms of anemia    Traumatic hematoma of knee, left, initial encounter  Pt has a full thickness necrotic area left knee which Dr Marie will debride tomorrow  Continue ceftriaxone and vancomycin    S/P debridement with a wound vac in place   Continuing antibiotics        VTE Risk Mitigation (From admission, onward)           Ordered     IP VTE LOW RISK PATIENT  Once         02/05/24 1449     Place sequential compression device  Until discontinued         02/05/24 1449                    Discharge Planning   VALERIANO: 2/8/2024     Code Status: Full Code   Is the patient medically ready for discharge?:     Reason for patient still in hospital (select all that apply): Patient new problem, Treatment, and Consult recommendations  Discharge Plan A: Home Health   Discharge Delays: None known at this time              Ramiro Damon MD  Department of Hospital Medicine   [facility]

## 2024-02-07 NOTE — RESPIRATORY THERAPY
02/06/24 1935   Patient Assessment/Suction   Level of Consciousness (AVPU) alert   Respiratory Effort Normal;Unlabored   Expansion/Accessory Muscles/Retractions no retractions;no use of accessory muscles   All Lung Fields Breath Sounds coarse;diminished   PRE-TX-O2   Device (Oxygen Therapy) nasal cannula   Flow (L/min) 2   SpO2 97 %   Pulse Oximetry Type Intermittent   $ Pulse Oximetry - Multiple Charge Pulse Oximetry - Multiple   Pulse 91   Resp 16   Aerosol Therapy   $ Aerosol Therapy Charges PRN treatment not required   Respiratory Treatment Status (SVN) PRN treatment not required   Education   $ Education Bronchodilator;15 min

## 2024-02-07 NOTE — NURSING
Hemoglobin of 6.9 reported from lab. Dr. Corona notified. Order for type and screen and blood transfusion ordered.

## 2024-02-07 NOTE — CARE UPDATE
02/07/24 1117   Patient Assessment/Suction   Level of Consciousness (AVPU) alert   Respiratory Effort Normal;Unlabored   Expansion/Accessory Muscles/Retractions no use of accessory muscles   All Lung Fields Breath Sounds clear   Rhythm/Pattern, Respiratory unlabored   PRE-TX-O2   Device (Oxygen Therapy) nasal cannula   $ Is the patient on Low Flow Oxygen? Yes   Flow (L/min) 3   SpO2 95 %   Pulse Oximetry Type Continuous   $ Pulse Oximetry - Multiple Charge Pulse Oximetry - Multiple   Pulse 79   Resp 16   Aerosol Therapy   $ Aerosol Therapy Charges PRN treatment not required   Daily Review of Necessity (SVN) completed   Respiratory Evaluation   $ Care Plan Tech Time 15 min     CHANGE TO RA - SATS 95%ON RA -  99% ON 3LPM

## 2024-02-07 NOTE — ASSESSMENT & PLAN NOTE
Patient's anemia is currently uncontrolled. Has received 2 units of PRBCs on 2/7/2024 . Etiology likely d/t acute blood loss which was from wound  Current CBC reviewed-   Lab Results   Component Value Date    HGB 7.0 (L) 02/07/2024    HCT 23.7 (L) 02/07/2024     Monitor serial CBC and transfuse if patient becomes hemodynamically unstable, symptomatic or H/H drops below 7/21.    Transfusing 1 unit of packed cells for symptoms of anemia

## 2024-02-07 NOTE — PT/OT/SLP PROGRESS
Occupational Therapy      Patient Name:  Iris Mueller   MRN:  [id number]    Patient not seen today secondary to Other (Comment) (Pt declined due to fatigue and pain). Will follow-up next service date.    2/7/2024

## 2024-02-07 NOTE — SUBJECTIVE & OBJECTIVE
Interval History: Ms Mueller notes VALERIO. She has had a recent surgery with wound vac in place  One blood count of 6.9 will transfuse 1 unit prbc's  I will order an iron and TIBC and administer iron for Fe deficiency    Review of Systems   Constitutional:  Positive for activity change, diaphoresis and fatigue. Negative for chills and fever.   HENT: Negative.     Eyes: Negative.    Respiratory:          VALERIO   Cardiovascular:  Positive for palpitations.   Gastrointestinal:  Positive for abdominal distention. Negative for abdominal pain.   Endocrine: Positive for cold intolerance.   Genitourinary:  Positive for difficulty urinating.   Musculoskeletal:  Positive for arthralgias, gait problem and myalgias.   Skin:  Positive for wound.   Allergic/Immunologic: Negative.    Neurological:  Positive for weakness.   Hematological:  Bruises/bleeds easily.   Psychiatric/Behavioral:  The patient is nervous/anxious.      Objective:     Vital Signs (Most Recent):  Temp: 97.9 °F (36.6 °C) (02/07/24 0841)  Pulse: 77 (02/07/24 0841)  Resp: 16 (02/07/24 0911)  BP: 121/60 (02/07/24 0841)  SpO2: 99 % (02/07/24 0841) Vital Signs (24h Range):  Temp:  [97.7 °F (36.5 °C)-98.8 °F (37.1 °C)] 97.9 °F (36.6 °C)  Pulse:  [74-91] 77  Resp:  [16-18] 16  SpO2:  [94 %-100 %] 99 %  BP: (111-142)/(60-91) 121/60     Weight: 115.8 kg (255 lb 4.8 oz)  Body mass index is 45.22 kg/m².    Intake/Output Summary (Last 24 hours) at 2/7/2024 1021  Last data filed at 2/7/2024 0417  Gross per 24 hour   Intake 780 ml   Output 1300 ml   Net -520 ml         Physical Exam  Vitals and nursing note reviewed.   Constitutional:       General: She is not in acute distress.  HENT:      Head: Normocephalic and atraumatic.      Nose: Nose normal.      Mouth/Throat:      Mouth: Mucous membranes are moist.   Eyes:      Extraocular Movements: Extraocular movements intact.      Pupils: Pupils are equal, round, and reactive to light.      Comments: Pale conjunctiva    Cardiovascular:      Rate and Rhythm: Normal rate and regular rhythm.      Heart sounds:      Gallop present.   Pulmonary:      Breath sounds: Rales present.   Abdominal:      General: There is distension.      Tenderness: There is no abdominal tenderness. There is no guarding or rebound.   Musculoskeletal:      Cervical back: Normal range of motion and neck supple.      Comments: Left leg in a brace with wound vac   Skin:     General: Skin is warm.   Neurological:      Mental Status: She is alert and oriented to person, place, and time.   Psychiatric:      Comments: Mildly dysphoric mood             Significant Labs: All pertinent labs within the past 24 hours have been reviewed.  Recent Lab Results         02/07/24  0834   02/07/24  0733   02/07/24  0518        Unit Blood Type Code   1700  [P]         Unit Expiration   [id number]  [P]         Unit Blood Type   B NEG  [P]         Albumin     3.0       ALP     88       ALT     6       Anion Gap     2       AST     8       Baso #     0.03       Basophil %     0.5       BILIRUBIN TOTAL     0.3  Comment: For infants and newborns, interpretation of results should be based  on gestational age, weight and in agreement with clinical  observations.    Premature Infant recommended reference ranges:  Up to 24 hours.............<8.0 mg/dL  Up to 48 hours............<12.0 mg/dL  3-5 days..................<15.0 mg/dL  6-29 days.................<15.0 mg/dL         BUN     22       Calcium     8.2       Chloride     103       CO2     28       CODING SYSTEM   [id number]  [P]         Creatinine     2.1       Crossmatch Interpretation   Compatible  [P]         Differential Method     Automated       DISPENSE STATUS   CROSSMATCHED  [P]         eGFR     24.9       Eos #     0.2       Eos %     2.9       Glucose     85       Gran # (ANC)     4.4       Gran %     74.8       Group & Rh   B NEG         Hematocrit   23.7   23.7       Hemoglobin   7.0   6.9  Comment: Results confirmed, test  repeated  HGB critical result(s) repeated. Called and verbal readback obtained   from Moises Ponce RN by JRSolange 02/07/2024 06:10         Immature Grans (Abs)     0.02  Comment: Mild elevation in immature granulocytes is non specific and   can be seen in a variety of conditions including stress response,   acute inflammation, trauma and pregnancy. Correlation with other   laboratory and clinical findings is essential.         Immature Granulocytes     0.3       INDIRECT KAYLIE   NEG         Lymph #     0.7       Lymph %     11.8       MCH   28.0   27.5       MCHC   29.5   29.1       MCV   95   94       Mono #     0.6       Mono %     9.7       MPV   9.0   9.2       nRBC     0       Platelet Count   313   326       POC Glucose 98           Potassium     4.7       Product Code   [id number]  [P]         PROTEIN TOTAL     5.2       RBC   2.50   2.51       RDW   17.2   17.2       Sodium     133       Specimen Outdate   02/10/2024 23:59         UNIT NUMBER   [id number]  [P]         WBC   6.15   5.87                [P] - Preliminary Result               Significant Imaging: I have reviewed all pertinent imaging results/findings within the past 24 hours.

## 2024-02-08 LAB
ALBUMIN SERPL BCP-MCNC: 3.1 G/DL (ref 3.5–5.2)
ALP SERPL-CCNC: 91 U/L (ref 55–135)
ALT SERPL W/O P-5'-P-CCNC: 7 U/L (ref 10–44)
ANION GAP SERPL CALC-SCNC: 5 MMOL/L (ref 8–16)
AST SERPL-CCNC: 9 U/L (ref 10–40)
BASOPHILS # BLD AUTO: 0.03 K/UL (ref 0–0.2)
BASOPHILS NFR BLD: 0.4 % (ref 0–1.9)
BILIRUB SERPL-MCNC: 0.4 MG/DL (ref 0.1–1)
BUN SERPL-MCNC: 24 MG/DL (ref 8–23)
CALCIUM SERPL-MCNC: 8.4 MG/DL (ref 8.7–10.5)
CHLORIDE SERPL-SCNC: 104 MMOL/L (ref 95–110)
CO2 SERPL-SCNC: 27 MMOL/L (ref 23–29)
CREAT SERPL-MCNC: 1.9 MG/DL (ref 0.5–1.4)
DIFFERENTIAL METHOD BLD: ABNORMAL
EOSINOPHIL # BLD AUTO: 0.2 K/UL (ref 0–0.5)
EOSINOPHIL NFR BLD: 3.1 % (ref 0–8)
ERYTHROCYTE [DISTWIDTH] IN BLOOD BY AUTOMATED COUNT: 17.6 % (ref 11.5–14.5)
EST. GFR  (NO RACE VARIABLE): 28.1 ML/MIN/1.73 M^2
FOLATE SERPL-MCNC: 5 NG/ML (ref 4–24)
GLUCOSE SERPL-MCNC: 106 MG/DL (ref 70–110)
GLUCOSE SERPL-MCNC: 88 MG/DL (ref 70–110)
HCT VFR BLD AUTO: 26.8 % (ref 37–48.5)
HGB BLD-MCNC: 7.8 G/DL (ref 12–16)
IMM GRANULOCYTES # BLD AUTO: 0.03 K/UL (ref 0–0.04)
IMM GRANULOCYTES NFR BLD AUTO: 0.4 % (ref 0–0.5)
LYMPHOCYTES # BLD AUTO: 0.6 K/UL (ref 1–4.8)
LYMPHOCYTES NFR BLD: 8.8 % (ref 18–48)
MCH RBC QN AUTO: 26.5 PG (ref 27–31)
MCHC RBC AUTO-ENTMCNC: 29.1 G/DL (ref 32–36)
MCV RBC AUTO: 91 FL (ref 82–98)
MONOCYTES # BLD AUTO: 0.5 K/UL (ref 0.3–1)
MONOCYTES NFR BLD: 7.6 % (ref 4–15)
NEUTROPHILS # BLD AUTO: 5.3 K/UL (ref 1.8–7.7)
NEUTROPHILS NFR BLD: 79.7 % (ref 38–73)
NRBC BLD-RTO: 0 /100 WBC
PLATELET # BLD AUTO: 285 K/UL (ref 150–450)
PMV BLD AUTO: 9.3 FL (ref 9.2–12.9)
POTASSIUM SERPL-SCNC: 4.3 MMOL/L (ref 3.5–5.1)
PROT SERPL-MCNC: 5.6 G/DL (ref 6–8.4)
RBC # BLD AUTO: 2.94 M/UL (ref 4–5.4)
SODIUM SERPL-SCNC: 136 MMOL/L (ref 136–145)
WBC # BLD AUTO: 6.7 K/UL (ref 3.9–12.7)

## 2024-02-08 PROCEDURE — 99900035 HC TECH TIME PER 15 MIN (STAT)

## 2024-02-08 PROCEDURE — 25000003 PHARM REV CODE 250: Performed by: ORTHOPAEDIC SURGERY

## 2024-02-08 PROCEDURE — 12000002 HC ACUTE/MED SURGE SEMI-PRIVATE ROOM

## 2024-02-08 PROCEDURE — 97530 THERAPEUTIC ACTIVITIES: CPT

## 2024-02-08 PROCEDURE — 36415 COLL VENOUS BLD VENIPUNCTURE: CPT | Performed by: INTERNAL MEDICINE

## 2024-02-08 PROCEDURE — 63600175 PHARM REV CODE 636 W HCPCS: Performed by: INTERNAL MEDICINE

## 2024-02-08 PROCEDURE — 82746 ASSAY OF FOLIC ACID SERUM: CPT | Performed by: INTERNAL MEDICINE

## 2024-02-08 PROCEDURE — 25000003 PHARM REV CODE 250: Performed by: INTERNAL MEDICINE

## 2024-02-08 PROCEDURE — 85025 COMPLETE CBC W/AUTO DIFF WBC: CPT | Performed by: ORTHOPAEDIC SURGERY

## 2024-02-08 PROCEDURE — 97116 GAIT TRAINING THERAPY: CPT | Mod: CQ

## 2024-02-08 PROCEDURE — 97535 SELF CARE MNGMENT TRAINING: CPT

## 2024-02-08 PROCEDURE — 94760 N-INVAS EAR/PLS OXIMETRY 1: CPT

## 2024-02-08 PROCEDURE — 63600175 PHARM REV CODE 636 W HCPCS: Performed by: ORTHOPAEDIC SURGERY

## 2024-02-08 PROCEDURE — S4991 NICOTINE PATCH NONLEGEND: HCPCS | Performed by: INTERNAL MEDICINE

## 2024-02-08 PROCEDURE — 80053 COMPREHEN METABOLIC PANEL: CPT | Performed by: ORTHOPAEDIC SURGERY

## 2024-02-08 PROCEDURE — 99900031 HC PATIENT EDUCATION (STAT)

## 2024-02-08 PROCEDURE — 97530 THERAPEUTIC ACTIVITIES: CPT | Mod: CQ

## 2024-02-08 RX ADMIN — NICOTINE 1 PATCH: 21 PATCH, EXTENDED RELEASE TRANSDERMAL at 08:02

## 2024-02-08 RX ADMIN — CEFTRIAXONE SODIUM 1 G: 1 INJECTION, POWDER, FOR SOLUTION INTRAMUSCULAR; INTRAVENOUS at 06:02

## 2024-02-08 RX ADMIN — METOPROLOL SUCCINATE 25 MG: 25 TABLET, EXTENDED RELEASE ORAL at 08:02

## 2024-02-08 RX ADMIN — VANCOMYCIN HYDROCHLORIDE 500 MG: 500 INJECTION, POWDER, LYOPHILIZED, FOR SOLUTION INTRAVENOUS at 07:02

## 2024-02-08 RX ADMIN — SODIUM CHLORIDE 125 MG: 9 INJECTION, SOLUTION INTRAVENOUS at 06:02

## 2024-02-08 RX ADMIN — GABAPENTIN 300 MG: 300 CAPSULE ORAL at 08:02

## 2024-02-08 RX ADMIN — AMIODARONE HYDROCHLORIDE 200 MG: 200 TABLET ORAL at 08:02

## 2024-02-08 RX ADMIN — ATORVASTATIN CALCIUM 10 MG: 10 TABLET, FILM COATED ORAL at 08:02

## 2024-02-08 RX ADMIN — BUPROPION HYDROCHLORIDE 150 MG: 150 TABLET, EXTENDED RELEASE ORAL at 08:02

## 2024-02-08 RX ADMIN — MUPIROCIN 1 G: 20 OINTMENT TOPICAL at 08:02

## 2024-02-08 RX ADMIN — OXYCODONE HYDROCHLORIDE 5 MG: 5 TABLET ORAL at 09:02

## 2024-02-08 RX ADMIN — FLUOXETINE HYDROCHLORIDE 40 MG: 20 CAPSULE ORAL at 08:02

## 2024-02-08 RX ADMIN — CETIRIZINE HYDROCHLORIDE 10 MG: 10 TABLET, FILM COATED ORAL at 08:02

## 2024-02-08 RX ADMIN — AMLODIPINE BESYLATE 2.5 MG: 2.5 TABLET ORAL at 08:02

## 2024-02-08 NOTE — ASSESSMENT & PLAN NOTE
Continue the wound VAC until the battery runs out.  Okay to remove VAC once suction is stopped.  Do daily dressing changes at that point.  Keep incision dry.  Okay to weight bear as tolerated in the knee immobilizer.  Follow up with me in 2 weeks for wound check and suture removal.  Patient may remove the brace but needs to keep the knee straight at all times.

## 2024-02-08 NOTE — PT/OT/SLP PROGRESS
Occupational Therapy   Treatment    Name: Iris Mueller  MRN: [id number]  Admitting Diagnosis:  Acute blood loss anemia  3 Days Post-Op    Recommendations:     Discharge Recommendations: Moderate Intensity Therapy  Discharge Equipment Recommendations:   (TBD)  Barriers to discharge:       Assessment:     Iris Mueller is a 68 y.o. female with a medical diagnosis of Acute blood loss anemia.  Pt agreeable to OT therapy session this AM. Performance deficits affecting function are weakness, impaired endurance, impaired self care skills, impaired functional mobility, gait instability, impaired balance, decreased coordination, decreased upper extremity function, decreased lower extremity function, decreased safety awareness, pain, impaired cardiopulmonary response to activity, orthopedic precautions.     Upon entry, pt up in chair and wishing to ambulate to sink to brush teeth. Pt stood with moderate assist x 2 and ambulated to sink with SBA/CGA with rollator. Pt stood at sink with SBA to brush teeth and wash face. After pt was finishing up, pt's RLE suddenly started to give way without warning, requiring maximum assistance for OT to assist pt to remain in standing while pt's daughter grabbed BSC for pt to sit on. Pt unable to ambulate back to chair and unable to get into [place] steady, so pt required t/f to w/c x 2 person assist to t/f back to bedside chair.    Rehab Prognosis:  Fair; patient would benefit from acute skilled OT services to address these deficits and reach maximum level of function.       Plan:     Patient to be seen 5 x/week to address the above listed problems via self-care/home management, therapeutic activities, therapeutic exercises  Plan of Care Expires: 03/04/24  Plan of Care Reviewed with: patient, daughter, spouse    Subjective     Chief Complaint: none stated  Patient/Family Comments/goals: none stated  Pain/Comfort:  Pain Rating 1:  (not rated)  Location - Side 1:  Left  Location 1: knee  Pain Addressed 1: Reposition, Distraction, Cessation of Activity    Objective:     Communicated with: nursing prior to session.  Patient found up in chair with telemetry, wound vac, peripheral IV upon OT entry to room.    General Precautions: Standard, fall    Orthopedic Precautions:LLE weight bearing as tolerated  Braces: Knee immobilizer  Respiratory Status: Room air     Occupational Performance:     Functional Mobility/Transfers:  Patient completed Sit <> Stand Transfer with moderate assistance and of 2 persons  with  hand-held assist   Patient completed Bed <> Chair Transfer using Stand Pivot technique with moderate assistance and of 2 persons with hand-held assist  Functional Mobility: pt amb to sink with SBA with rollator with no LOB or SOB    Activities of Daily Living:  Grooming: SBA standing at sink to brush teeth and to wash face    Treatment & Education:  Pt educated on role of OT/POC, importance of OOB/EOB activity, use of call bell, and safety during ADLs, transfers, and functional mobility.    Patient left up in chair with all lines intact, call button in reach, nursing notified, and family present    GOALS:   Multidisciplinary Problems       Occupational Therapy Goals          Problem: Occupational Therapy    Goal Priority Disciplines Outcome Interventions   Occupational Therapy Goal     OT, PT/OT Ongoing, Progressing    Description: Goals to be met by: 3/4/2024     Patient will increase functional independence with ADLs by performing:    UE Dressing with Supervision.  LE Dressing with Supervision.  Grooming while standing at sink with Supervision.  Toileting from toilet with Supervision for hygiene and clothing management.   Bathing from  shower chair/bench with Stand-by Assistance.  Toilet transfer to toilet with Supervision.                         Time Tracking:     OT Date of Treatment: 02/08/24  OT Start Time: 1003  OT Stop Time: 1100  OT Total Time (min): 57  min    Billable Minutes:Self Care/Home Management 10  Therapeutic Activity 47    OT/EMI: OT          2/8/2024

## 2024-02-08 NOTE — SUBJECTIVE & OBJECTIVE
"Interval History:     Review of Systems  Objective:     Vital Signs (Most Recent):  Temp: 97.2 °F (36.2 °C) (02/08/24 1216)  Pulse: 86 (02/08/24 1216)  Resp: 17 (02/08/24 1216)  BP: (!) 166/93 (02/08/24 1216)  SpO2: (!) 93 % (02/08/24 1216) Vital Signs (24h Range):  Temp:  [97.2 °F (36.2 °C)-99.5 °F (37.5 °C)] 97.2 °F (36.2 °C)  Pulse:  [85-96] 86  Resp:  [16-18] 17  SpO2:  [93 %-96 %] 93 %  BP: (155-170)/(86-99) 166/93     Weight: 115.8 kg (255 lb 4.8 oz)  Body mass index is 45.22 kg/m².    Intake/Output Summary (Last 24 hours) at 2/8/2024 1538  Last data filed at 2/8/2024 0603  Gross per 24 hour   Intake 883.75 ml   Output 1550 ml   Net -666.25 ml         Physical Exam  Vitals and nursing note reviewed.   HENT:      Head: Normocephalic and atraumatic.   Eyes:      Conjunctiva/sclera: Conjunctivae normal.   Neck:      Vascular: No JVD.   Cardiovascular:      Heart sounds: Normal heart sounds.   Pulmonary:      Effort: Pulmonary effort is normal.      Breath sounds: Normal breath sounds.   Abdominal:      Palpations: Abdomen is soft.   Musculoskeletal:         General: Normal range of motion.   Skin:     General: Skin is warm.   Neurological:      Mental Status: She is alert and oriented to person, place, and time.   Psychiatric:         Mood and Affect: Mood normal.             Significant Labs: All pertinent labs within the past 24 hours have been reviewed.  CMP:   Recent Labs   Lab 02/07/24  0518 02/08/24  0542   * 136   K 4.7 4.3    104   CO2 28 27   GLU 85 88   BUN 22 24*   CREATININE 2.1* 1.9*   CALCIUM 8.2* 8.4*   PROT 5.2* 5.6*   ALBUMIN 3.0* 3.1*   BILITOT 0.3 0.4   ALKPHOS 88 91   AST 8* 9*   ALT 6* 7*   ANIONGAP 2* 5*     Cardiac Markers: No results for input(s): "CKMB", "MYOGLOBIN", "BNP", "TROPISTAT" in the last 48 hours.  Coagulation: No results for input(s): "PT", "INR", "APTT" in the last 48 hours.    Significant Imaging: I have reviewed all pertinent imaging results/findings within the " past 24 hours.

## 2024-02-08 NOTE — PROGRESS NOTES
[facility]  Orthopedics  Progress Note    Patient Name: Iris Mueller  MRN: [id number]  Admission Date: 2/3/2024  Hospital Length of Stay: 5 days  Attending Provider: Ruy Benavides MD  Primary Care Provider: Elkin You MD  Follow-up For: Procedure(s) (LRB):  INCISION AND DRAINAGE, HEMATOMA (Left)    Post-Operative Day: 3 Days Post-Op  Subjective:     Principal Problem:Acute blood loss anemia    Principal Orthopedic Problem:  Left knee skin wound    Interval History:  Doing well.  Resting comfortably in bed.      Review of patient's allergies indicates:  No Known Allergies    Current Facility-Administered Medications   Medication    0.9%  NaCl infusion (for blood administration)    0.9%  NaCl infusion (for blood administration)    0.9%  NaCl infusion (for blood administration)    albuterol nebulizer solution 2.5 mg    amiodarone tablet 200 mg    amLODIPine tablet 2.5 mg    atorvastatin tablet 10 mg    buPROPion TBSR 12 hr tablet 150 mg    ceftaroline (TEFLARO) 400 mg in dextrose 5 % 50 mL IVPB (ready to mix)    cefTRIAXone (ROCEPHIN) 1 g in dextrose 5 % 100 mL IVPB (ready to mix)    cetirizine tablet 10 mg    dextrose 50% injection 12.5 g    dextrose 50% injection 25 g    diphenhydrAMINE injection 12.5 mg    ferric gluconate (FERRLECIT) 125 mg in sodium chloride 0.9% 100 mL IVPB    FLUoxetine capsule 40 mg    gabapentin capsule 300 mg    glucagon (human recombinant) injection 1 mg    glucose chewable tablet 16 g    glucose chewable tablet 24 g    HYDROmorphone injection 0.2 mg    melatonin tablet 9 mg    metoprolol succinate (TOPROL-XL) 24 hr tablet 25 mg    naloxone 0.4 mg/mL injection 0.02 mg    nicotine 21 mg/24 hr 1 patch    ondansetron injection 4 mg    ondansetron injection 4 mg    oxyCODONE immediate release tablet 5 mg    sodium chloride 0.9% flush 10 mL    sodium chloride 0.9% flush 10 mL    traZODone tablet 100 mg    vancomycin - pharmacy to dose    vancomycin 500 mg in  "dextrose 5 % 100 mL IVPB (ready to mix)    vancomycin 500 mg in dextrose 5 % 100 mL IVPB (ready to mix)     Facility-Administered Medications Ordered in Other Encounters   Medication    lidocaine (PF) 10 mg/ml (1%) injection 5 mg     Objective:     Vital Signs (Most Recent):  Temp: 97.2 °F (36.2 °C) (02/08/24 1216)  Pulse: 86 (02/08/24 1216)  Resp: 17 (02/08/24 1216)  BP: (!) 166/93 (02/08/24 1216)  SpO2: (!) 93 % (02/08/24 1216) Vital Signs (24h Range):  Temp:  [97.2 °F (36.2 °C)-99.5 °F (37.5 °C)] 97.2 °F (36.2 °C)  Pulse:  [85-92] 86  Resp:  [16-18] 17  SpO2:  [93 %-96 %] 93 %  BP: (155-170)/(86-99) 166/93     Weight: 115.8 kg (255 lb 4.8 oz)  Height: 5' 3" (160 cm)  Body mass index is 45.22 kg/m².      Intake/Output Summary (Last 24 hours) at 2/8/2024 1655  Last data filed at 2/8/2024 1100  Gross per 24 hour   Intake 720 ml   Output 1550 ml   Net -830 ml         General    Nursing note and vitals reviewed.  Constitutional: She is oriented to person, place, and time. She appears well-developed and well-nourished. No distress.   HENT:   Head: Normocephalic and atraumatic.   Eyes: EOM are normal.   Cardiovascular:  Normal rate.            Pulmonary/Chest: Effort normal.   Abdominal: Soft.   Neurological: She is alert and oriented to person, place, and time.   Psychiatric: Her behavior is normal.           Right Knee Exam   Right knee exam is normal.    Left Knee Exam     Inspection   Erythema: absent  Swelling: present    Range of Motion   Extension:  0   Flexion:  0     Other   Sensation: normal    Comments:  Wound VAC with good seal.  Wearing the knee immobilizer.    Muscle Strength   Left Lower Extremity   Quadriceps:  4/5     Vascular Exam       Left Pulses  Dorsalis Pedis:      2+          Edema  Left Lower Leg: absent       Significant Labs: CBC:   Recent Labs   Lab 02/07/24  0518 02/07/24  0733 02/08/24  0542   WBC 5.87 6.15 6.70   HGB 6.9* 7.0* 7.8*   HCT 23.7* 23.7* 26.8*    313 285       CMP: " "  Recent Labs   Lab 02/07/24  0518 02/08/24  0542   * 136   K 4.7 4.3    104   CO2 28 27   GLU 85 88   BUN 22 24*   CREATININE 2.1* 1.9*   CALCIUM 8.2* 8.4*   PROT 5.2* 5.6*   ALBUMIN 3.0* 3.1*   BILITOT 0.3 0.4   ALKPHOS 88 91   AST 8* 9*   ALT 6* 7*   ANIONGAP 2* 5*       Coagulation: No results for input(s): "LABPROT", "INR", "APTT" in the last 48 hours.  All pertinent labs within the past 24 hours have been reviewed.    Significant Imaging: None  Assessment/Plan:     Traumatic hematoma of knee, left, initial encounter  Continue the wound VAC until the battery runs out.  Okay to remove VAC once suction is stopped.  Do daily dressing changes at that point.  Keep incision dry.  Okay to weight bear as tolerated in the knee immobilizer.  Follow up with me in 2 weeks for wound check and suture removal.  Patient may remove the brace but needs to keep the knee straight at all times.            Bryson Huerta MD  Orthopedics  [facility]    "

## 2024-02-08 NOTE — PROGRESS NOTES
[facility] Medicine  Progress Note    Patient Name: Iris Mueller  MRN: [id number]  Patient Class: IP- Inpatient   Admission Date: 2/3/2024  Length of Stay: 5 days  Attending Physician: Ruy Benavides MD  Primary Care Provider: Elkin You MD        Subjective:     Principal Problem:Acute blood loss anemia        HPI:  No notes on file    Overview/Hospital Course:  2/8  Assumed care. No signs of bleeding . Status post debridement. Getting physical therapy. Appeared to be iron-deficiency and ordered 1 unit of iron infusion. Add  folate level and occult blood     Interval History:     Review of Systems  Objective:     Vital Signs (Most Recent):  Temp: 97.2 °F (36.2 °C) (02/08/24 1216)  Pulse: 86 (02/08/24 1216)  Resp: 17 (02/08/24 1216)  BP: (!) 166/93 (02/08/24 1216)  SpO2: (!) 93 % (02/08/24 1216) Vital Signs (24h Range):  Temp:  [97.2 °F (36.2 °C)-99.5 °F (37.5 °C)] 97.2 °F (36.2 °C)  Pulse:  [85-96] 86  Resp:  [16-18] 17  SpO2:  [93 %-96 %] 93 %  BP: (155-170)/(86-99) 166/93     Weight: 115.8 kg (255 lb 4.8 oz)  Body mass index is 45.22 kg/m².    Intake/Output Summary (Last 24 hours) at 2/8/2024 1538  Last data filed at 2/8/2024 0603  Gross per 24 hour   Intake 883.75 ml   Output 1550 ml   Net -666.25 ml         Physical Exam  Vitals and nursing note reviewed.   HENT:      Head: Normocephalic and atraumatic.   Eyes:      Conjunctiva/sclera: Conjunctivae normal.   Neck:      Vascular: No JVD.   Cardiovascular:      Heart sounds: Normal heart sounds.   Pulmonary:      Effort: Pulmonary effort is normal.      Breath sounds: Normal breath sounds.   Abdominal:      Palpations: Abdomen is soft.   Musculoskeletal:         General: Normal range of motion.   Skin:     General: Skin is warm.   Neurological:      Mental Status: She is alert and oriented to person, place, and time.   Psychiatric:         Mood and Affect: Mood normal.             Significant Labs: All pertinent labs  "within the past 24 hours have been reviewed.  CMP:   Recent Labs   Lab 02/07/24  0518 02/08/24  0542   * 136   K 4.7 4.3    104   CO2 28 27   GLU 85 88   BUN 22 24*   CREATININE 2.1* 1.9*   CALCIUM 8.2* 8.4*   PROT 5.2* 5.6*   ALBUMIN 3.0* 3.1*   BILITOT 0.3 0.4   ALKPHOS 88 91   AST 8* 9*   ALT 6* 7*   ANIONGAP 2* 5*     Cardiac Markers: No results for input(s): "CKMB", "MYOGLOBIN", "BNP", "TROPISTAT" in the last 48 hours.  Coagulation: No results for input(s): "PT", "INR", "APTT" in the last 48 hours.    Significant Imaging: I have reviewed all pertinent imaging results/findings within the past 24 hours.    Assessment/Plan:      * Acute blood loss anemia  Patient's anemia is currently uncontrolled. Has received 2 units of PRBCs on 2/7/2024 . Etiology likely d/t acute blood loss which was from wound  Current CBC reviewed-   Lab Results   Component Value Date    HGB 7.0 (L) 02/07/2024    HCT 23.7 (L) 02/07/2024     Monitor serial CBC and transfuse if patient becomes hemodynamically unstable, symptomatic or H/H drops below 7/21.    Transfused  1 unit of packed cells for symptoms of anemia and repeat one more   Add folate and occult blood   If Hb stable tomorrow . DC tomorrow . Follow PT . Patient not keen to go to facility   Need GI follow up as OP     Traumatic hematoma of knee, left, initial encounter    Continue ceftriaxone and vancomycin      S/P debridement with a wound vac in place   No intra op culture sent         VTE Risk Mitigation (From admission, onward)           Ordered     IP VTE LOW RISK PATIENT  Once         02/05/24 1449     Place sequential compression device  Until discontinued         02/05/24 1449                    Discharge Planning   VALERIANO: 2/9/2024     Code Status: Full Code   Is the patient medically ready for discharge?:     Reason for patient still in hospital (select all that apply): Treatment  Discharge Plan A: Home Health   Discharge Delays: None known at this " time              Ruy Benavides MD  Department of Hospital Medicine   [facility]

## 2024-02-08 NOTE — PLAN OF CARE
Problem: Physical Therapy  Goal: Physical Therapy Goal  Description: Goals to be met by: 24     Patient will increase functional independence with mobility by performin. Supine to sit with [place]  2. Sit to stand transfer with Modified [place]  3. Gait  x 75 feet with Modified [place] using Rolling Walker.     Outcome: Ongoing, Progressing

## 2024-02-08 NOTE — PT/OT/SLP PROGRESS
Physical Therapy Treatment    Patient Name:  Iris Mueller   MRN:  [id number]    Recommendations:     Discharge Recommendations: Moderate Intensity Therapy  Discharge Equipment Recommendations: none  Barriers to discharge:  increased assist with mobility, decreased activity tolerance, pain, orthopedic precautions, decreased safety awareness    Assessment:     Iris Mueller is a 68 y.o. female admitted with a medical diagnosis of Acute blood loss anemia.  She presents with the following impairments/functional limitations: weakness, impaired endurance, impaired self care skills, impaired functional mobility, gait instability, impaired balance, impaired cognition, decreased coordination, decreased upper extremity function, decreased lower extremity function, decreased safety awareness, decreased ROM, impaired skin, impaired cardiopulmonary response to activity.    OT reports pt became to weak to ambulate and had to be assisted to sit on bedside commode. Attempted to transfer pt via sera steady but unable. Plan to t/f pt to wheelchair and then transfer back to bedside chair. Tech retrieved wheelchair.     Pt sitting on BSC with OT and family present. Pt performed stand pivot transfer from BSC to wheelchair with mod assist x 2 and hand hold assist. Pt wheeled back over to bedside chair.    Pt performed stand pivot transfer from wheelchair to chair with mod assist x 2 and hand hold assist.    Rehab Prognosis: Fair; patient would benefit from acute skilled PT services to address these deficits and reach maximum level of function.    Recent Surgery: Procedure(s) (LRB):  INCISION AND DRAINAGE, HEMATOMA (Left) 3 Days Post-Op    Plan:     During this hospitalization, patient to be seen 6 x/week to address the identified rehab impairments via gait training, therapeutic activities, therapeutic exercises, neuromuscular re-education and progress toward the following goals:    Plan of Care Expires:   24    Subjective     Chief Complaint: L knee pain  Patient/Family Comments/goals: to get stronger  Pain/Comfort:  Pain Rating 1: other (see comments) (not rated)  Location - Side 1: Left  Location 1: knee  Pain Addressed 1: Reposition, Distraction, Cessation of Activity  Pain Rating Post-Intervention 1: other (see comments) (not rated)      Objective:     Communicated with RN prior to session.  Patient found  sitting on BSC  with telemetry, peripheral IV, oxygen, wound vac upon PT entry to room.     General Precautions: Standard, fall  Orthopedic Precautions: LLE weight bearing as tolerated  Braces: Knee immobilizer  Respiratory Status: Room air     Functional Mobility:  Transfers:     Bed to Chair: moderate assistance and of 2 persons with  hand-held assist  using  Stand Pivot      AM-PAC 6 CLICK MOBILITY          Treatment & Education:  Pt educated on importance of time OOB, importance of intermittent mobility, safe techniques for transfers/ambulation, discharge recommendations/options, and use of call light for assistance and fall prevention.      Patient left up in chair with all lines intact, call button in reach, Nurse notified, and family present..    GOALS:   Multidisciplinary Problems       Physical Therapy Goals          Problem: Physical Therapy    Goal Priority Disciplines Outcome Goal Variances Interventions   Physical Therapy Goal     PT, PT/OT Ongoing, Progressing     Description: Goals to be met by: 24     Patient will increase functional independence with mobility by performin. Supine to sit with [place]  2. Sit to stand transfer with Modified [place]  3. Gait  x 75 feet with Modified [place] using Rolling Walker.                          Time Tracking:     PT Received On: 24  PT Start Time: 1039     PT Stop Time: 1054  PT Total Time (min): 15 min     Billable Minutes: Therapeutic Activity 15    Treatment Type: Treatment  PT/PTA: PTA     Number of PTA visits  since last PT visit: 3     02/08/2024

## 2024-02-08 NOTE — SUBJECTIVE & OBJECTIVE
Principal Problem:Acute blood loss anemia    Principal Orthopedic Problem:  Left knee skin wound    Interval History:  Doing well.  Resting comfortably in bed.      Review of patient's allergies indicates:  No Known Allergies    Current Facility-Administered Medications   Medication    0.9%  NaCl infusion (for blood administration)    0.9%  NaCl infusion (for blood administration)    0.9%  NaCl infusion (for blood administration)    albuterol nebulizer solution 2.5 mg    amiodarone tablet 200 mg    amLODIPine tablet 2.5 mg    atorvastatin tablet 10 mg    buPROPion TBSR 12 hr tablet 150 mg    ceftaroline (TEFLARO) 400 mg in dextrose 5 % 50 mL IVPB (ready to mix)    cefTRIAXone (ROCEPHIN) 1 g in dextrose 5 % 100 mL IVPB (ready to mix)    cetirizine tablet 10 mg    dextrose 50% injection 12.5 g    dextrose 50% injection 25 g    diphenhydrAMINE injection 12.5 mg    ferric gluconate (FERRLECIT) 125 mg in sodium chloride 0.9% 100 mL IVPB    FLUoxetine capsule 40 mg    gabapentin capsule 300 mg    glucagon (human recombinant) injection 1 mg    glucose chewable tablet 16 g    glucose chewable tablet 24 g    HYDROmorphone injection 0.2 mg    melatonin tablet 9 mg    metoprolol succinate (TOPROL-XL) 24 hr tablet 25 mg    naloxone 0.4 mg/mL injection 0.02 mg    nicotine 21 mg/24 hr 1 patch    ondansetron injection 4 mg    ondansetron injection 4 mg    oxyCODONE immediate release tablet 5 mg    sodium chloride 0.9% flush 10 mL    sodium chloride 0.9% flush 10 mL    traZODone tablet 100 mg    vancomycin - pharmacy to dose    vancomycin 500 mg in dextrose 5 % 100 mL IVPB (ready to mix)    vancomycin 500 mg in dextrose 5 % 100 mL IVPB (ready to mix)     Facility-Administered Medications Ordered in Other Encounters   Medication    lidocaine (PF) 10 mg/ml (1%) injection 5 mg     Objective:     Vital Signs (Most Recent):  Temp: 97.2 °F (36.2 °C) (02/08/24 1216)  Pulse: 86 (02/08/24 1216)  Resp: 17 (02/08/24 1216)  BP: (!) 166/93  "(02/08/24 1216)  SpO2: (!) 93 % (02/08/24 1216) Vital Signs (24h Range):  Temp:  [97.2 °F (36.2 °C)-99.5 °F (37.5 °C)] 97.2 °F (36.2 °C)  Pulse:  [85-92] 86  Resp:  [16-18] 17  SpO2:  [93 %-96 %] 93 %  BP: (155-170)/(86-99) 166/93     Weight: 115.8 kg (255 lb 4.8 oz)  Height: 5' 3" (160 cm)  Body mass index is 45.22 kg/m².      Intake/Output Summary (Last 24 hours) at 2/8/2024 1655  Last data filed at 2/8/2024 1100  Gross per 24 hour   Intake 720 ml   Output 1550 ml   Net -830 ml          General    Nursing note and vitals reviewed.  Constitutional: She is oriented to person, place, and time. She appears well-developed and well-nourished. No distress.   HENT:   Head: Normocephalic and atraumatic.   Eyes: EOM are normal.   Cardiovascular:  Normal rate.            Pulmonary/Chest: Effort normal.   Abdominal: Soft.   Neurological: She is alert and oriented to person, place, and time.   Psychiatric: Her behavior is normal.           Right Knee Exam   Right knee exam is normal.    Left Knee Exam     Inspection   Erythema: absent  Swelling: present    Range of Motion   Extension:  0   Flexion:  0     Other   Sensation: normal    Comments:  Wound VAC with good seal.  Wearing the knee immobilizer.    Muscle Strength   Left Lower Extremity   Quadriceps:  4/5     Vascular Exam       Left Pulses  Dorsalis Pedis:      2+          Edema  Left Lower Leg: absent       Significant Labs: CBC:   Recent Labs   Lab 02/07/24  0518 02/07/24  0733 02/08/24  0542   WBC 5.87 6.15 6.70   HGB 6.9* 7.0* 7.8*   HCT 23.7* 23.7* 26.8*    313 285       CMP:   Recent Labs   Lab 02/07/24  0518 02/08/24  0542   * 136   K 4.7 4.3    104   CO2 28 27   GLU 85 88   BUN 22 24*   CREATININE 2.1* 1.9*   CALCIUM 8.2* 8.4*   PROT 5.2* 5.6*   ALBUMIN 3.0* 3.1*   BILITOT 0.3 0.4   ALKPHOS 88 91   AST 8* 9*   ALT 6* 7*   ANIONGAP 2* 5*       Coagulation: No results for input(s): "LABPROT", "INR", "APTT" in the last 48 hours.  All pertinent " labs within the past 24 hours have been reviewed.    Significant Imaging: None

## 2024-02-08 NOTE — CARE UPDATE
02/08/24 0745   Patient Assessment/Suction   Level of Consciousness (AVPU) alert   Respiratory Effort Normal;Unlabored   Expansion/Accessory Muscles/Retractions no use of accessory muscles;no retractions   All Lung Fields Breath Sounds clear   Rhythm/Pattern, Respiratory unlabored;pattern regular   PRE-TX-O2   Device (Oxygen Therapy) room air   SpO2 (!) 94 %   Pulse Oximetry Type Intermittent   $ Pulse Oximetry - Single Charge Pulse Oximetry - Single   Pulse 90   Resp 18   Aerosol Therapy   $ Aerosol Therapy Charges PRN treatment not required   Daily Review of Necessity (SVN) completed   Respiratory Treatment Status (SVN) PRN treatment not required   Education   $ Education 15 min;Bronchodilator

## 2024-02-08 NOTE — CARE UPDATE
02/07/24 2028   Patient Assessment/Suction   Level of Consciousness (AVPU) alert   Respiratory Effort Normal;Unlabored   Expansion/Accessory Muscles/Retractions no retractions;no use of accessory muscles   Rhythm/Pattern, Respiratory unlabored;pattern regular   Cough Frequency no cough   PRE-TX-O2   Device (Oxygen Therapy) room air   SpO2 95 %   Pulse Oximetry Type Intermittent   $ Pulse Oximetry - Single Charge Pulse Oximetry - Single

## 2024-02-08 NOTE — PT/OT/SLP PROGRESS
Physical Therapy Treatment    Patient Name:  Iris Mueller   MRN:  [id number]    Recommendations:     Discharge Recommendations: Moderate Intensity Therapy  Discharge Equipment Recommendations: none  Barriers to discharge:  increased assist with mobility, decreased activity tolerance, pain, orthopedic precautions, decreased safety awareness    Assessment:     Iris Mueller is a 68 y.o. female admitted with a medical diagnosis of Acute blood loss anemia.  She presents with the following impairments/functional limitations: weakness, impaired endurance, impaired self care skills, impaired functional mobility, gait instability, impaired balance, impaired cognition, decreased coordination, decreased upper extremity function, decreased lower extremity function, decreased safety awareness, decreased ROM, impaired skin, impaired cardiopulmonary response to activity.    Pt with HOB elevated and agreeable to visit. Pt reports that she did not sleep well last night. Pt required min to mod assist for supine to sit transfer with most assist for LLE and upper body.    Pt required mod assist x 2 for sit to stand transfer with rollator. Pt with initial posterior lean due to pt having immobilizer on LLE and not being able to bend knee, so she initially stands with LLE out in front. Therapist and tech facilitate weight shift anteriorly.    Pt ambulated 30' with rollator and min assist x 2 for safety and navigation. Pt reports that she has to have a BM. Pt required mod assist x 2 for toilet transfer. Pt performed hygiene.    Pt mod to max assist x 2 to stand up from toilet with posterior lean. Pt ambulated 20' back to chair with rollator and min assist x 2.    Pt somewhat impulsive with mobility and requires cuing for safety.    Pt left up in chair with tray set up in front.    Rehab Prognosis: Fair; patient would benefit from acute skilled PT services to address these deficits and reach maximum level of function.     Recent Surgery: Procedure(s) (LRB):  INCISION AND DRAINAGE, HEMATOMA (Left) 3 Days Post-Op    Plan:     During this hospitalization, patient to be seen 6 x/week to address the identified rehab impairments via gait training, therapeutic activities, therapeutic exercises, neuromuscular re-education and progress toward the following goals:    Plan of Care Expires:  03/04/24    Subjective     Chief Complaint: L knee pain and not being able to bend L knee  Patient/Family Comments/goals: to get better  Pain/Comfort:  Pain Rating 1: other (see comments) (not rated)  Location - Side 1: Left  Location 1: knee  Pain Addressed 1: Reposition, Distraction, Cessation of Activity  Pain Rating Post-Intervention 1: other (see comments) (not rated)      Objective:     Communicated with RN prior to session.  Patient found HOB elevated with telemetry, peripheral IV, oxygen, wound vac upon PT entry to room.     General Precautions: Standard, fall  Orthopedic Precautions: LLE weight bearing as tolerated  Braces: Knee immobilizer  Respiratory Status: Room air     Functional Mobility:  Bed Mobility:     Supine to Sit: minimum assistance and moderate assistance  Transfers:     Sit to Stand:  moderate assistance and of 2 persons with rollator  Toilet Transfer: moderate assistance, maximal assistance, and of 2 persons with  rollator  using  Step Transfer  Gait: x 40' and 20' with rollator and Damaso x2, impulsive      AM-PAC 6 CLICK MOBILITY          Treatment & Education:  Pt educated on importance of time OOB, importance of intermittent mobility, safe techniques for transfers/ambulation, discharge recommendations/options, and use of call light for assistance and fall prevention.      Patient left up in chair with all lines intact, call button in reach, and RN notified..    GOALS:   Multidisciplinary Problems       Physical Therapy Goals          Problem: Physical Therapy    Goal Priority Disciplines Outcome Goal Variances Interventions    Physical Therapy Goal     PT, PT/OT Ongoing, Progressing     Description: Goals to be met by: 24     Patient will increase functional independence with mobility by performin. Supine to sit with [place]  2. Sit to stand transfer with Modified [place]  3. Gait  x 75 feet with Modified [place] using Rolling Walker.                          Time Tracking:     PT Received On: 24  PT Start Time: 912     PT Stop Time: 952  PT Total Time (min): 40 min     Billable Minutes: Gait Training 15 and Therapeutic Activity 25    Treatment Type: Treatment  PT/PTA: PTA     Number of PTA visits since last PT visit: 3     2024

## 2024-02-09 LAB
ALBUMIN SERPL BCP-MCNC: 3.3 G/DL (ref 3.5–5.2)
ALP SERPL-CCNC: 94 U/L (ref 55–135)
ALT SERPL W/O P-5'-P-CCNC: 7 U/L (ref 10–44)
ANION GAP SERPL CALC-SCNC: 6 MMOL/L (ref 8–16)
AST SERPL-CCNC: 12 U/L (ref 10–40)
BACTERIA BLD CULT: NORMAL
BACTERIA BLD CULT: NORMAL
BASOPHILS # BLD AUTO: 0.05 K/UL (ref 0–0.2)
BASOPHILS NFR BLD: 0.8 % (ref 0–1.9)
BILIRUB SERPL-MCNC: 0.5 MG/DL (ref 0.1–1)
BILIRUB UR QL STRIP: NEGATIVE
BUN SERPL-MCNC: 19 MG/DL (ref 8–23)
CALCIUM SERPL-MCNC: 8.5 MG/DL (ref 8.7–10.5)
CHLORIDE SERPL-SCNC: 105 MMOL/L (ref 95–110)
CLARITY UR: CLEAR
CO2 SERPL-SCNC: 25 MMOL/L (ref 23–29)
COLOR UR: COLORLESS
CREAT SERPL-MCNC: 1.8 MG/DL (ref 0.5–1.4)
DIFFERENTIAL METHOD BLD: ABNORMAL
EOSINOPHIL # BLD AUTO: 0.2 K/UL (ref 0–0.5)
EOSINOPHIL NFR BLD: 3.8 % (ref 0–8)
ERYTHROCYTE [DISTWIDTH] IN BLOOD BY AUTOMATED COUNT: 17.8 % (ref 11.5–14.5)
EST. GFR  (NO RACE VARIABLE): 29.9 ML/MIN/1.73 M^2
GLUCOSE SERPL-MCNC: 84 MG/DL (ref 70–110)
GLUCOSE UR QL STRIP: NEGATIVE
HCT VFR BLD AUTO: 28.6 % (ref 37–48.5)
HGB BLD-MCNC: 8.6 G/DL (ref 12–16)
HGB UR QL STRIP: NEGATIVE
IMM GRANULOCYTES # BLD AUTO: 0.02 K/UL (ref 0–0.04)
IMM GRANULOCYTES NFR BLD AUTO: 0.3 % (ref 0–0.5)
KETONES UR QL STRIP: NEGATIVE
LEUKOCYTE ESTERASE UR QL STRIP: NEGATIVE
LYMPHOCYTES # BLD AUTO: 0.6 K/UL (ref 1–4.8)
LYMPHOCYTES NFR BLD: 9.8 % (ref 18–48)
MCH RBC QN AUTO: 27.3 PG (ref 27–31)
MCHC RBC AUTO-ENTMCNC: 30.1 G/DL (ref 32–36)
MCV RBC AUTO: 91 FL (ref 82–98)
MONOCYTES # BLD AUTO: 0.5 K/UL (ref 0.3–1)
MONOCYTES NFR BLD: 8.5 % (ref 4–15)
NEUTROPHILS # BLD AUTO: 4.6 K/UL (ref 1.8–7.7)
NEUTROPHILS NFR BLD: 76.8 % (ref 38–73)
NITRITE UR QL STRIP: NEGATIVE
NRBC BLD-RTO: 0 /100 WBC
OB PNL STL: NEGATIVE
PH UR STRIP: 7 [PH] (ref 5–8)
PLATELET # BLD AUTO: 336 K/UL (ref 150–450)
PMV BLD AUTO: 9.2 FL (ref 9.2–12.9)
POTASSIUM SERPL-SCNC: 4.2 MMOL/L (ref 3.5–5.1)
PROT SERPL-MCNC: 5.8 G/DL (ref 6–8.4)
PROT UR QL STRIP: NEGATIVE
RBC # BLD AUTO: 3.15 M/UL (ref 4–5.4)
SODIUM SERPL-SCNC: 136 MMOL/L (ref 136–145)
SP GR UR STRIP: 1 (ref 1–1.03)
URN SPEC COLLECT METH UR: ABNORMAL
UROBILINOGEN UR STRIP-ACNC: NEGATIVE EU/DL
WBC # BLD AUTO: 5.99 K/UL (ref 3.9–12.7)

## 2024-02-09 PROCEDURE — 25000003 PHARM REV CODE 250: Performed by: ORTHOPAEDIC SURGERY

## 2024-02-09 PROCEDURE — 80053 COMPREHEN METABOLIC PANEL: CPT | Performed by: ORTHOPAEDIC SURGERY

## 2024-02-09 PROCEDURE — 85025 COMPLETE CBC W/AUTO DIFF WBC: CPT | Performed by: ORTHOPAEDIC SURGERY

## 2024-02-09 PROCEDURE — 12000002 HC ACUTE/MED SURGE SEMI-PRIVATE ROOM

## 2024-02-09 PROCEDURE — 63600175 PHARM REV CODE 636 W HCPCS: Performed by: ORTHOPAEDIC SURGERY

## 2024-02-09 PROCEDURE — 25000003 PHARM REV CODE 250: Performed by: STUDENT IN AN ORGANIZED HEALTH CARE EDUCATION/TRAINING PROGRAM

## 2024-02-09 PROCEDURE — 81003 URINALYSIS AUTO W/O SCOPE: CPT | Performed by: ORTHOPAEDIC SURGERY

## 2024-02-09 PROCEDURE — 36415 COLL VENOUS BLD VENIPUNCTURE: CPT | Performed by: ORTHOPAEDIC SURGERY

## 2024-02-09 PROCEDURE — S4991 NICOTINE PATCH NONLEGEND: HCPCS | Performed by: INTERNAL MEDICINE

## 2024-02-09 PROCEDURE — 82272 OCCULT BLD FECES 1-3 TESTS: CPT | Performed by: INTERNAL MEDICINE

## 2024-02-09 PROCEDURE — 63600175 PHARM REV CODE 636 W HCPCS: Performed by: INTERNAL MEDICINE

## 2024-02-09 PROCEDURE — 86580 TB INTRADERMAL TEST: CPT | Performed by: INTERNAL MEDICINE

## 2024-02-09 PROCEDURE — 25000003 PHARM REV CODE 250: Performed by: INTERNAL MEDICINE

## 2024-02-09 PROCEDURE — 97116 GAIT TRAINING THERAPY: CPT | Mod: CQ

## 2024-02-09 PROCEDURE — 97530 THERAPEUTIC ACTIVITIES: CPT | Mod: CQ

## 2024-02-09 PROCEDURE — 30200315 PPD INTRADERMAL TEST REV CODE 302: Performed by: INTERNAL MEDICINE

## 2024-02-09 RX ORDER — OXYCODONE HYDROCHLORIDE 5 MG/1
15 TABLET ORAL EVERY 6 HOURS PRN
Qty: 20 TABLET | Refills: 0 | Status: SHIPPED | OUTPATIENT
Start: 2024-02-09 | End: 2024-02-14

## 2024-02-09 RX ORDER — AMLODIPINE BESYLATE 5 MG/1
5 TABLET ORAL ONCE
Status: COMPLETED | OUTPATIENT
Start: 2024-02-09 | End: 2024-02-09

## 2024-02-09 RX ORDER — GABAPENTIN 300 MG/1
300 CAPSULE ORAL 3 TIMES DAILY PRN
Status: DISCONTINUED | OUTPATIENT
Start: 2024-02-09 | End: 2024-02-12 | Stop reason: HOSPADM

## 2024-02-09 RX ORDER — LABETALOL HYDROCHLORIDE 5 MG/ML
10 INJECTION, SOLUTION INTRAVENOUS EVERY 4 HOURS PRN
Status: DISCONTINUED | OUTPATIENT
Start: 2024-02-09 | End: 2024-02-12 | Stop reason: HOSPADM

## 2024-02-09 RX ORDER — ACETAMINOPHEN 325 MG/1
650 TABLET ORAL EVERY 6 HOURS PRN
Status: DISCONTINUED | OUTPATIENT
Start: 2024-02-09 | End: 2024-02-12 | Stop reason: HOSPADM

## 2024-02-09 RX ORDER — AMLODIPINE BESYLATE 5 MG/1
5 TABLET ORAL NIGHTLY
Status: DISCONTINUED | OUTPATIENT
Start: 2024-02-09 | End: 2024-02-12 | Stop reason: HOSPADM

## 2024-02-09 RX ORDER — HYDRALAZINE HYDROCHLORIDE 20 MG/ML
10 INJECTION INTRAMUSCULAR; INTRAVENOUS EVERY 4 HOURS PRN
Status: DISCONTINUED | OUTPATIENT
Start: 2024-02-09 | End: 2024-02-12 | Stop reason: HOSPADM

## 2024-02-09 RX ADMIN — NICOTINE 1 PATCH: 21 PATCH, EXTENDED RELEASE TRANSDERMAL at 09:02

## 2024-02-09 RX ADMIN — METOPROLOL SUCCINATE 25 MG: 25 TABLET, EXTENDED RELEASE ORAL at 09:02

## 2024-02-09 RX ADMIN — GABAPENTIN 300 MG: 300 CAPSULE ORAL at 09:02

## 2024-02-09 RX ADMIN — AMIODARONE HYDROCHLORIDE 200 MG: 200 TABLET ORAL at 09:02

## 2024-02-09 RX ADMIN — TUBERCULIN PURIFIED PROTEIN DERIVATIVE 5 UNITS: 5 INJECTION INTRADERMAL at 05:02

## 2024-02-09 RX ADMIN — AMLODIPINE BESYLATE 5 MG: 5 TABLET ORAL at 12:02

## 2024-02-09 RX ADMIN — TRAZODONE HYDROCHLORIDE 100 MG: 50 TABLET ORAL at 08:02

## 2024-02-09 RX ADMIN — CEFTRIAXONE SODIUM 1 G: 1 INJECTION, POWDER, FOR SOLUTION INTRAMUSCULAR; INTRAVENOUS at 06:02

## 2024-02-09 RX ADMIN — AMLODIPINE BESYLATE 5 MG: 5 TABLET ORAL at 08:02

## 2024-02-09 RX ADMIN — BUPROPION HYDROCHLORIDE 150 MG: 150 TABLET, EXTENDED RELEASE ORAL at 08:02

## 2024-02-09 RX ADMIN — CETIRIZINE HYDROCHLORIDE 10 MG: 10 TABLET, FILM COATED ORAL at 09:02

## 2024-02-09 RX ADMIN — BUPROPION HYDROCHLORIDE 150 MG: 150 TABLET, EXTENDED RELEASE ORAL at 09:02

## 2024-02-09 RX ADMIN — VANCOMYCIN HYDROCHLORIDE 500 MG: 500 INJECTION, POWDER, LYOPHILIZED, FOR SOLUTION INTRAVENOUS at 06:02

## 2024-02-09 RX ADMIN — FLUOXETINE HYDROCHLORIDE 40 MG: 20 CAPSULE ORAL at 09:02

## 2024-02-09 RX ADMIN — HYDRALAZINE HYDROCHLORIDE 10 MG: 20 INJECTION INTRAMUSCULAR; INTRAVENOUS at 10:02

## 2024-02-09 RX ADMIN — ATORVASTATIN CALCIUM 10 MG: 10 TABLET, FILM COATED ORAL at 08:02

## 2024-02-09 RX ADMIN — METOPROLOL SUCCINATE 25 MG: 25 TABLET, EXTENDED RELEASE ORAL at 08:02

## 2024-02-09 NOTE — PROGRESS NOTES
[facility]  Progress Note    Patient Name: Iris Mueller  MRN: [id number]  Patient Class: IP- Inpatient   Admission Date: 2/3/2024  Length of Stay: 6 days  Attending Physician: Ruy Benavides MD  Primary Care Provider: Elkin You MD        Subjective:     Principal Problem:Acute blood loss anemia        HPI:  No notes on file    Overview/Hospital Course:  2/8  Assumed care. No signs of bleeding . Status post debridement. Getting physical therapy. Appeared to be iron-deficiency and ordered 1 unit of iron infusion. Add  folate level and occult blood     2/9   Hb better . EYAD , gave iron infusion and Hb better. PT recommend SNF and working . NO cp or SOB    Interval History:     Review of Systems  Objective:     Vital Signs (Most Recent):  Temp: 98.3 °F (36.8 °C) (02/09/24 1100)  Pulse: 82 (02/09/24 1100)  Resp: 18 (02/09/24 1100)  BP: (!) 167/61 (notified nurse) (02/09/24 1100)  SpO2: (!) 93 % (02/09/24 1100) Vital Signs (24h Range):  Temp:  [98.1 °F (36.7 °C)-99.3 °F (37.4 °C)] 98.3 °F (36.8 °C)  Pulse:  [] 82  Resp:  [18] 18  SpO2:  [91 %-99 %] 93 %  BP: (145-194)/() 167/61     Weight: 115.8 kg (255 lb 4.8 oz)  Body mass index is 45.22 kg/m².    Intake/Output Summary (Last 24 hours) at 2/9/2024 1302  Last data filed at 2/9/2024 0858  Gross per 24 hour   Intake 240 ml   Output 1200 ml   Net -960 ml         Physical Exam  Vitals and nursing note reviewed.   HENT:      Head: Normocephalic and atraumatic.   Eyes:      Conjunctiva/sclera: Conjunctivae normal.   Neck:      Vascular: No JVD.   Cardiovascular:      Heart sounds: Normal heart sounds.   Pulmonary:      Effort: Pulmonary effort is normal.      Breath sounds: Normal breath sounds.   Abdominal:      Palpations: Abdomen is soft.   Skin:     General: Skin is warm.   Neurological:      Mental Status: She is alert and oriented to person, place, and time.   Psychiatric:         Mood and Affect: Mood  normal.             Significant Labs: All pertinent labs within the past 24 hours have been reviewed.  BMP:   Recent Labs   Lab 02/09/24  0638   GLU 84      K 4.2      CO2 25   BUN 19   CREATININE 1.8*   CALCIUM 8.5*     CBC:   Recent Labs   Lab 02/08/24  0542 02/09/24  0638   WBC 6.70 5.99   HGB 7.8* 8.6*   HCT 26.8* 28.6*    336     CMP:   Recent Labs   Lab 02/08/24  0542 02/09/24  0638    136   K 4.3 4.2    105   CO2 27 25   GLU 88 84   BUN 24* 19   CREATININE 1.9* 1.8*   CALCIUM 8.4* 8.5*   PROT 5.6* 5.8*   ALBUMIN 3.1* 3.3*   BILITOT 0.4 0.5   ALKPHOS 91 94   AST 9* 12   ALT 7* 7*   ANIONGAP 5* 6*       Significant Imaging: I have reviewed all pertinent imaging results/findings within the past 24 hours.    Assessment/Plan:      * Acute blood loss anemia  Patient's anemia is currently uncontrolled. Has received 2 units of PRBCs on 2/7/2024 . Etiology likely d/t acute blood loss which was from wound  Current CBC reviewed-         Lab Results   Component Value Date     HGB 7.0 (L) 02/07/2024     HCT 23.7 (L) 02/07/2024      Monitor serial CBC and transfuse if patient becomes hemodynamically unstable, symptomatic or H/H drops below 7/21.     Transfused  1 unit of packed cells for symptoms of anemia and repeat one more   Add folate and occult blood   If Hb stable tomorrow . DC to SNF  . Follow PT . Patient not keen to go to facility   Need GI follow up as OP      Traumatic hematoma of knee, left, initial encounter     Continue ceftriaxone and vancomycin      S/P debridement with a wound vac in place   No intra op culture sent      VTE Risk Mitigation (From admission, onward)           Ordered     IP VTE LOW RISK PATIENT  Once         02/05/24 1449     Place sequential compression device  Until discontinued         02/05/24 1449                    Discharge Planning   VALERIANO: 2/9/2024     Code Status: Full Code   Is the patient medically ready for discharge?:     Reason for patient still  in hospital (select all that apply): Treatment  Discharge Plan A: Skilled Nursing Facility   Discharge Delays: None known at this time              Ruy Benavides MD  Department of Hospital Medicine   [facility]

## 2024-02-09 NOTE — NURSING
"Notified by Qing, floor nurse, that wound vacc making a noise. Saw pt. for follow up.  Knee immobilizer in place with Incisional Vacc functioning properly. Daughter @ bedside and reports sometimes canister gets a little loose and was instructed by Ortho if this happens to "push canister back in some".  Reports she did that and now works fine again.  Small amount of serosanguinous drainage noted to canister. Sponge visible under Ace bandage and is vacuum sealed.  No leaks noted. Instructed daughter to call for nurse if happens again and not able to stop alarm. Daughter acknowledged instructions.   "

## 2024-02-09 NOTE — PT/OT/SLP PROGRESS
Occupational Therapy      Patient Name:  Iris Mueller   MRN:  [id number]    Patient not seen today secondary to  (in room with Physical Therapy.).    2/9/2024

## 2024-02-09 NOTE — PLAN OF CARE
1530: patient accepted at [facility] per liaison Karen and can admit Monday 2/12/24.  Katherine with [facility] updated, auth# [id number].  Daughter Barbara updated at phone number on face sheet, she verbalized agreement and understanding.     1500: received notice from Katherine with [facility] that SNF has been approved pending facility acceptance.  CM to notify Katherine when a facility has been arranged.     Patient has ultimately agreed to SNF placement with no preference in local facilities as long as in-network with [facility].  Referrals placed in [facility], auth request sent to [facility].  LOCET/PASRR pending. PPD order placed. Last BM yesterday per chart review.        02/09/24 1252   Discharge Reassessment   Assessment Type Discharge Planning Reassessment   Did the patient's condition or plan change since previous assessment? Yes   Discharge Plan discussed with: Patient;Adult children   Discharge Plan A Skilled Nursing Facility   Discharge Plan B Home Health   Transition of Care Barriers None   Why the patient remains in the hospital Placement issues;Social issues   Post-Acute Status   Post-Acute Authorization Placement   Post-Acute Placement Status Referrals Sent   Home Health Status Discharge Plan Changed   Discharge Delays None known at this time

## 2024-02-09 NOTE — PLAN OF CARE
Met with patient, family, and PT at bedside to discuss discharge plan.  PT is recommending SNF placement and family is very concerned about her returning home due to lack of support as  is elderly and nearly deaf.  Patient is not interested in going to a facility and wants to return home with home health.  Right to self determination explained to family in detail and understanding was verbalized.  Family wished to speak with patient in private to elaborate their concerns, will follow up shortly.        02/09/24 1202   Post-Acute Status   Post-Acute Authorization Home Health   Home Health Status Set-up Complete/Auth obtained

## 2024-02-09 NOTE — NURSING
Blood pressure check during midnight rounds was 180/104. MD notified. New order for one time dose of Amlodipine 5mg administered. Patient asymptomatic at this time. Will continue to monitor

## 2024-02-09 NOTE — SUBJECTIVE & OBJECTIVE
Interval History:     Review of Systems  Objective:     Vital Signs (Most Recent):  Temp: 98.3 °F (36.8 °C) (02/09/24 1100)  Pulse: 82 (02/09/24 1100)  Resp: 18 (02/09/24 1100)  BP: (!) 167/61 (notified nurse) (02/09/24 1100)  SpO2: (!) 93 % (02/09/24 1100) Vital Signs (24h Range):  Temp:  [98.1 °F (36.7 °C)-99.3 °F (37.4 °C)] 98.3 °F (36.8 °C)  Pulse:  [] 82  Resp:  [18] 18  SpO2:  [91 %-99 %] 93 %  BP: (145-194)/() 167/61     Weight: 115.8 kg (255 lb 4.8 oz)  Body mass index is 45.22 kg/m².    Intake/Output Summary (Last 24 hours) at 2/9/2024 1302  Last data filed at 2/9/2024 0858  Gross per 24 hour   Intake 240 ml   Output 1200 ml   Net -960 ml         Physical Exam  Vitals and nursing note reviewed.   HENT:      Head: Normocephalic and atraumatic.   Eyes:      Conjunctiva/sclera: Conjunctivae normal.   Neck:      Vascular: No JVD.   Cardiovascular:      Heart sounds: Normal heart sounds.   Pulmonary:      Effort: Pulmonary effort is normal.      Breath sounds: Normal breath sounds.   Abdominal:      Palpations: Abdomen is soft.   Skin:     General: Skin is warm.   Neurological:      Mental Status: She is alert and oriented to person, place, and time.   Psychiatric:         Mood and Affect: Mood normal.             Significant Labs: All pertinent labs within the past 24 hours have been reviewed.  BMP:   Recent Labs   Lab 02/09/24  0638   GLU 84      K 4.2      CO2 25   BUN 19   CREATININE 1.8*   CALCIUM 8.5*     CBC:   Recent Labs   Lab 02/08/24  0542 02/09/24  0638   WBC 6.70 5.99   HGB 7.8* 8.6*   HCT 26.8* 28.6*    336     CMP:   Recent Labs   Lab 02/08/24  0542 02/09/24  0638    136   K 4.3 4.2    105   CO2 27 25   GLU 88 84   BUN 24* 19   CREATININE 1.9* 1.8*   CALCIUM 8.4* 8.5*   PROT 5.6* 5.8*   ALBUMIN 3.1* 3.3*   BILITOT 0.4 0.5   ALKPHOS 91 94   AST 9* 12   ALT 7* 7*   ANIONGAP 5* 6*       Significant Imaging: I have reviewed all pertinent imaging  results/findings within the past 24 hours.

## 2024-02-09 NOTE — NURSING
Nurses Note -- 4 Eyes      2/8/2024   6:51 PM      Skin assessed during: Transfer      [] No Altered Skin Integrity Present    []Prevention Measures Documented      [x] Yes- Altered Skin Integrity Present or Discovered   [x] LDA Added if Not in Epic (Describe Wound)   [x] New Altered Skin Integrity was Present on Admit and Documented in LDA   [x] Wound Image Taken    Wound Care Consulted? Yes    Attending Nurse:  Javier Saucedo RN/Staff Member:   [id number]

## 2024-02-09 NOTE — PLAN OF CARE
CM left a detailed message with [facility] term [facility] at [phone] to completed a locet assessment. CM following for a return call.    02/09/24 1302   Post-Acute Status   Post-Acute Authorization Placement   Post-Acute Placement Status Pending state direction/certification

## 2024-02-09 NOTE — PT/OT/SLP PROGRESS
Physical Therapy Treatment    Patient Name:  Iris Mueller   MRN:  [id number]    Recommendations:     Discharge Recommendations: Moderate Intensity Therapy  Discharge Equipment Recommendations: none  Barriers to discharge:   increased assist with mobility, decreased activity tolerance, pain, orthopedic precautions, decreased safety awareness    Assessment:     Iris Mueller is a 68 y.o. female admitted with a medical diagnosis of Acute blood loss anemia.  She presents with the following impairments/functional limitations: weakness, impaired endurance, impaired self care skills, impaired functional mobility, gait instability, impaired balance, impaired cognition, decreased coordination, decreased upper extremity function, decreased lower extremity function, decreased safety awareness, decreased ROM, impaired skin, impaired cardiopulmonary response to activity.    Pt with HOB elevated with spouse and niece present. Right knee immobilizer in place. Pt agreeable to visit. Pt required min assist for supine to sit transfer. Pt required mod assist to scoot hips towards EOB for feet to touch the ground.    Pt required mod assist for sit to stand transfer from elevated bed with rollator. Pt noted to be soiled as purewick was not working. Pt ambulated 10' with rollator and min assist. Pt sat in chair while therapist removed soiled linens from bed.     entered during session to speak with pt and family about discharge recommendations. Pt adamant about returning home with family wanting pt to go to inpatient facility for therapy so that she can get stronger. Extender entered and put fresh linens on pt's bed.    Pt unable to get up from low chair with max assist x 2. Extender assisted with transfer and pt required mod assist x 2 to get up from low chair.    Pt ambulated 50' around room with rollator and min assist for safety due to left lean and impulsive movements. Pt reports fatigue and request  to rest. Pt returned to sitting EOB.    Pt educated on the amount of assist she continues to require and that her mobility is limited due to the orthopedist not wanting her to bend her right knee. Pt still wanting to go home.    Family to attempt to encourage pt to go to a skilled facility for therapy.    Pt left sitting EOB with extender present to do hygiene.     Rehab Prognosis: Fair; patient would benefit from acute skilled PT services to address these deficits and reach maximum level of function.    Recent Surgery: Procedure(s) (LRB):  INCISION AND DRAINAGE, HEMATOMA (Left) 4 Days Post-Op    Plan:     During this hospitalization, patient to be seen 6 x/week to address the identified rehab impairments via gait training, therapeutic activities, therapeutic exercises, neuromuscular re-education and progress toward the following goals:    Plan of Care Expires:  03/04/24    Subjective     Chief Complaint: pt wanting to return home and not go to a facility  Patient/Family Comments/goals: family would like pt to get stronger before returning home  Pain/Comfort:  Pain Rating 1: other (see comments) (not rated)  Location - Side 1: Left  Location 1: knee  Pain Addressed 1: Reposition, Distraction, Cessation of Activity  Pain Rating Post-Intervention 1: other (see comments) (not rated)      Objective:     Communicated with RN prior to session.  Patient found HOB elevated with telemetry, wound vac, peripheral IV, PureWick upon PT entry to room.     General Precautions: Standard, fall  Orthopedic Precautions: LLE weight bearing as tolerated  Braces: Knee immobilizer  Respiratory Status: Room air     Functional Mobility:  Bed Mobility:     Scooting: moderate assistance  Supine to Sit: minimum assistance  Transfers:     Sit to Stand:  moderate assistance and of 1-2 persons with rollator  Gait: x 10' and 50' with rollator and Damaso, left lateral lean with decreased weight bearing on RLE      AM-PAC 6 CLICK MOBILITY           Treatment & Education:  Pt educated on importance of time OOB, importance of intermittent mobility, safe techniques for transfers/ambulation, discharge recommendations/options, and use of call light for assistance and fall prevention.      Patient left sitting edge of bed with all lines intact, call button in reach, and family and extender present..    GOALS:   Multidisciplinary Problems       Physical Therapy Goals          Problem: Physical Therapy    Goal Priority Disciplines Outcome Goal Variances Interventions   Physical Therapy Goal     PT, PT/OT Ongoing, Progressing     Description: Goals to be met by: 24     Patient will increase functional independence with mobility by performin. Supine to sit with [place]  2. Sit to stand transfer with Modified [place]  3. Gait  x 75 feet with Modified [place] using Rolling Walker.                          Time Tracking:     PT Received On: 24  PT Start Time: 1059     PT Stop Time: 1156  PT Total Time (min): 57 min     Billable Minutes: Gait Training 15 and Therapeutic Activity 42    Treatment Type: Treatment  PT/PTA: PTA     Number of PTA visits since last PT visit: 4     2024

## 2024-02-10 LAB
ALBUMIN SERPL BCP-MCNC: 3.3 G/DL (ref 3.5–5.2)
ALP SERPL-CCNC: 92 U/L (ref 55–135)
ALT SERPL W/O P-5'-P-CCNC: 8 U/L (ref 10–44)
ANION GAP SERPL CALC-SCNC: 4 MMOL/L (ref 8–16)
AST SERPL-CCNC: 10 U/L (ref 10–40)
BASOPHILS # BLD AUTO: 0.03 K/UL (ref 0–0.2)
BASOPHILS NFR BLD: 0.4 % (ref 0–1.9)
BILIRUB SERPL-MCNC: 0.4 MG/DL (ref 0.1–1)
BUN SERPL-MCNC: 18 MG/DL (ref 8–23)
CALCIUM SERPL-MCNC: 8.6 MG/DL (ref 8.7–10.5)
CHLORIDE SERPL-SCNC: 105 MMOL/L (ref 95–110)
CO2 SERPL-SCNC: 30 MMOL/L (ref 23–29)
CREAT SERPL-MCNC: 1.7 MG/DL (ref 0.5–1.4)
DIFFERENTIAL METHOD BLD: ABNORMAL
EOSINOPHIL # BLD AUTO: 0.2 K/UL (ref 0–0.5)
EOSINOPHIL NFR BLD: 2.3 % (ref 0–8)
ERYTHROCYTE [DISTWIDTH] IN BLOOD BY AUTOMATED COUNT: 17.5 % (ref 11.5–14.5)
EST. GFR  (NO RACE VARIABLE): 32.1 ML/MIN/1.73 M^2
GLUCOSE SERPL-MCNC: 90 MG/DL (ref 70–110)
HCT VFR BLD AUTO: 28 % (ref 37–48.5)
HGB BLD-MCNC: 8.4 G/DL (ref 12–16)
IMM GRANULOCYTES # BLD AUTO: 0.05 K/UL (ref 0–0.04)
IMM GRANULOCYTES NFR BLD AUTO: 0.7 % (ref 0–0.5)
LYMPHOCYTES # BLD AUTO: 0.6 K/UL (ref 1–4.8)
LYMPHOCYTES NFR BLD: 8.7 % (ref 18–48)
MCH RBC QN AUTO: 26.6 PG (ref 27–31)
MCHC RBC AUTO-ENTMCNC: 30 G/DL (ref 32–36)
MCV RBC AUTO: 89 FL (ref 82–98)
MONOCYTES # BLD AUTO: 0.6 K/UL (ref 0.3–1)
MONOCYTES NFR BLD: 7.7 % (ref 4–15)
NEUTROPHILS # BLD AUTO: 5.8 K/UL (ref 1.8–7.7)
NEUTROPHILS NFR BLD: 80.2 % (ref 38–73)
NRBC BLD-RTO: 0 /100 WBC
PLATELET # BLD AUTO: 272 K/UL (ref 150–450)
PMV BLD AUTO: 9.4 FL (ref 9.2–12.9)
POTASSIUM SERPL-SCNC: 3.9 MMOL/L (ref 3.5–5.1)
PROT SERPL-MCNC: 5.7 G/DL (ref 6–8.4)
RBC # BLD AUTO: 3.16 M/UL (ref 4–5.4)
SODIUM SERPL-SCNC: 139 MMOL/L (ref 136–145)
WBC # BLD AUTO: 7.27 K/UL (ref 3.9–12.7)

## 2024-02-10 PROCEDURE — S4991 NICOTINE PATCH NONLEGEND: HCPCS | Performed by: INTERNAL MEDICINE

## 2024-02-10 PROCEDURE — 63600175 PHARM REV CODE 636 W HCPCS: Performed by: ORTHOPAEDIC SURGERY

## 2024-02-10 PROCEDURE — 99900031 HC PATIENT EDUCATION (STAT)

## 2024-02-10 PROCEDURE — 25000003 PHARM REV CODE 250: Performed by: ORTHOPAEDIC SURGERY

## 2024-02-10 PROCEDURE — 97116 GAIT TRAINING THERAPY: CPT | Mod: CQ

## 2024-02-10 PROCEDURE — 80053 COMPREHEN METABOLIC PANEL: CPT | Performed by: ORTHOPAEDIC SURGERY

## 2024-02-10 PROCEDURE — 25000003 PHARM REV CODE 250: Performed by: INTERNAL MEDICINE

## 2024-02-10 PROCEDURE — 25000003 PHARM REV CODE 250: Performed by: STUDENT IN AN ORGANIZED HEALTH CARE EDUCATION/TRAINING PROGRAM

## 2024-02-10 PROCEDURE — 85025 COMPLETE CBC W/AUTO DIFF WBC: CPT | Performed by: ORTHOPAEDIC SURGERY

## 2024-02-10 PROCEDURE — 12000002 HC ACUTE/MED SURGE SEMI-PRIVATE ROOM

## 2024-02-10 PROCEDURE — 97530 THERAPEUTIC ACTIVITIES: CPT | Mod: CQ

## 2024-02-10 PROCEDURE — 63600175 PHARM REV CODE 636 W HCPCS: Mod: JZ | Performed by: INTERNAL MEDICINE

## 2024-02-10 PROCEDURE — 99900035 HC TECH TIME PER 15 MIN (STAT)

## 2024-02-10 PROCEDURE — 94761 N-INVAS EAR/PLS OXIMETRY MLT: CPT

## 2024-02-10 RX ORDER — CLONIDINE HYDROCHLORIDE 0.1 MG/1
0.1 TABLET ORAL EVERY 6 HOURS PRN
Status: DISCONTINUED | OUTPATIENT
Start: 2024-02-10 | End: 2024-02-12 | Stop reason: HOSPADM

## 2024-02-10 RX ADMIN — AMLODIPINE BESYLATE 5 MG: 5 TABLET ORAL at 08:02

## 2024-02-10 RX ADMIN — LABETALOL HYDROCHLORIDE 10 MG: 5 INJECTION, SOLUTION INTRAVENOUS at 02:02

## 2024-02-10 RX ADMIN — NICOTINE 1 PATCH: 21 PATCH, EXTENDED RELEASE TRANSDERMAL at 09:02

## 2024-02-10 RX ADMIN — BUPROPION HYDROCHLORIDE 150 MG: 150 TABLET, EXTENDED RELEASE ORAL at 08:02

## 2024-02-10 RX ADMIN — CETIRIZINE HYDROCHLORIDE 10 MG: 10 TABLET, FILM COATED ORAL at 09:02

## 2024-02-10 RX ADMIN — ATORVASTATIN CALCIUM 10 MG: 10 TABLET, FILM COATED ORAL at 08:02

## 2024-02-10 RX ADMIN — CEFTRIAXONE SODIUM 1 G: 1 INJECTION, POWDER, FOR SOLUTION INTRAMUSCULAR; INTRAVENOUS at 05:02

## 2024-02-10 RX ADMIN — LABETALOL HYDROCHLORIDE 10 MG: 5 INJECTION, SOLUTION INTRAVENOUS at 06:02

## 2024-02-10 RX ADMIN — CLONIDINE HYDROCHLORIDE 0.1 MG: 0.1 TABLET ORAL at 04:02

## 2024-02-10 RX ADMIN — OXYCODONE HYDROCHLORIDE 5 MG: 5 TABLET ORAL at 08:02

## 2024-02-10 RX ADMIN — AMIODARONE HYDROCHLORIDE 200 MG: 200 TABLET ORAL at 09:02

## 2024-02-10 RX ADMIN — LABETALOL HYDROCHLORIDE 10 MG: 5 INJECTION, SOLUTION INTRAVENOUS at 08:02

## 2024-02-10 RX ADMIN — TRAZODONE HYDROCHLORIDE 100 MG: 50 TABLET ORAL at 08:02

## 2024-02-10 RX ADMIN — BUPROPION HYDROCHLORIDE 150 MG: 150 TABLET, EXTENDED RELEASE ORAL at 09:02

## 2024-02-10 RX ADMIN — METOPROLOL SUCCINATE 25 MG: 25 TABLET, EXTENDED RELEASE ORAL at 09:02

## 2024-02-10 RX ADMIN — METOPROLOL SUCCINATE 25 MG: 25 TABLET, EXTENDED RELEASE ORAL at 08:02

## 2024-02-10 NOTE — CARE UPDATE
02/10/24 0832   Patient Assessment/Suction   Level of Consciousness (AVPU) alert   Respiratory Effort Unlabored   Expansion/Accessory Muscles/Retractions no use of accessory muscles   All Lung Fields Breath Sounds clear;diminished   PRE-TX-O2   Device (Oxygen Therapy) room air   SpO2 (!) 91 %   Pulse Oximetry Type Intermittent   $ Pulse Oximetry - Multiple Charge Pulse Oximetry - Multiple   Pulse 93   Resp 15   Aerosol Therapy   $ Aerosol Therapy Charges PRN treatment not required   Education   $ Education Bronchodilator;15 min   Respiratory Evaluation   $ Care Plan Tech Time 15 min

## 2024-02-10 NOTE — PROGRESS NOTES
[facility] Medicine  Progress Note    Patient Name: Iris Mueller  MRN: [id number]  Patient Class: IP- Inpatient   Admission Date: 2/3/2024  Length of Stay: 7 days  Attending Physician: Ruy Benavides MD  Primary Care Provider: Elkin You MD        Subjective:     Principal Problem:Acute blood loss anemia        HPI:  No notes on file    Overview/Hospital Course:  2/8  Assumed care. No signs of bleeding . Status post debridement. Getting physical therapy. Appeared to be iron-deficiency and ordered 1 unit of iron infusion. Add  folate level and occult blood     2/9   Hb better . EYAD , gave iron infusion and Hb better. PT recommend SNF and working . NO cp or SOB    2/10  Hb stable . Moving toes. Decided to go SNF and awaited     Interval History:     Review of Systems  Objective:     Vital Signs (Most Recent):  Temp: 98.1 °F (36.7 °C) (02/10/24 1200)  Pulse: 93 (02/10/24 1200)  Resp: 17 (02/10/24 1200)  BP: (!) 159/83 (02/10/24 1200)  SpO2: 95 % (02/10/24 1200) Vital Signs (24h Range):  Temp:  [97.8 °F (36.6 °C)-98.8 °F (37.1 °C)] 98.1 °F (36.7 °C)  Pulse:  [] 93  Resp:  [15-18] 17  SpO2:  [91 %-95 %] 95 %  BP: (135-189)/() 159/83     Weight: 115.8 kg (255 lb 4.8 oz)  Body mass index is 45.22 kg/m².    Intake/Output Summary (Last 24 hours) at 2/10/2024 1257  Last data filed at 2/10/2024 0930  Gross per 24 hour   Intake 470 ml   Output 750 ml   Net -280 ml         Physical Exam  Vitals and nursing note reviewed.   HENT:      Head: Normocephalic and atraumatic.   Eyes:      Conjunctiva/sclera: Conjunctivae normal.   Neck:      Vascular: No JVD.   Cardiovascular:      Heart sounds: Normal heart sounds.   Pulmonary:      Effort: Pulmonary effort is normal.   Abdominal:      Palpations: Abdomen is soft.   Musculoskeletal:         General: Normal range of motion.   Skin:     General: Skin is warm.   Neurological:      Mental Status: She is alert and oriented to person,  place, and time.   Psychiatric:         Mood and Affect: Mood normal.             Significant Labs: All pertinent labs within the past 24 hours have been reviewed.  BMP:   Recent Labs   Lab 02/10/24  0426   GLU 90      K 3.9      CO2 30*   BUN 18   CREATININE 1.7*   CALCIUM 8.6*     CBC:   Recent Labs   Lab 02/09/24  0638 02/10/24  0426   WBC 5.99 7.27   HGB 8.6* 8.4*   HCT 28.6* 28.0*    272     CMP:   Recent Labs   Lab 02/09/24  0638 02/10/24  0426    139   K 4.2 3.9    105   CO2 25 30*   GLU 84 90   BUN 19 18   CREATININE 1.8* 1.7*   CALCIUM 8.5* 8.6*   PROT 5.8* 5.7*   ALBUMIN 3.3* 3.3*   BILITOT 0.5 0.4   ALKPHOS 94 92   AST 12 10   ALT 7* 8*   ANIONGAP 6* 4*       Significant Imaging: I have reviewed all pertinent imaging results/findings within the past 24 hours.    Assessment/Plan:      * Acute blood loss anemia  Patient's anemia is currently uncontrolled. Has received 2 units of PRBCs on 2/7/2024 . Etiology likely d/t acute blood loss which was from wound  Current CBC reviewed-             Lab Results   Component Value Date     HGB 7.0 (L) 02/07/2024     HCT 23.7 (L) 02/07/2024      Monitor serial CBC and transfuse if patient becomes hemodynamically unstable, symptomatic or H/H drops below 7/21.     Transfused  1 unit of packed cells for symptoms of anemia and repeat one more   Add folate and occult blood   If Hb stable tomorrow . DC to SNF  . Follow PT . Patient not keen to go to facility   Need GI follow up as OP      Traumatic hematoma of knee, left, initial encounter     Continue ceftriaxone and vancomycin      S/P debridement with a wound vac in place   No intra op culture sent   VTE Risk Mitigation (From admission, onward)           Ordered     IP VTE LOW RISK PATIENT  Once         02/05/24 1449     Place sequential compression device  Until discontinued         02/05/24 1449                    Discharge Planning   VALERIANO: 2/12/2024     Code Status: Full Code   Is the  patient medically ready for discharge?:     Reason for patient still in hospital (select all that apply): Treatment  Discharge Plan A: Skilled Nursing Facility   Discharge Delays: None known at this time              Ruy Benavides MD  Department of Hospital Medicine   [facility]

## 2024-02-10 NOTE — PT/OT/SLP PROGRESS
Physical Therapy Treatment    Patient Name:  Iris Mueller   MRN:  [id number]    Recommendations:     Discharge Recommendations: Moderate Intensity Therapy  Discharge Equipment Recommendations: none  Barriers to discharge: None    Assessment:     Iris Mueller is a 68 y.o. female admitted with a medical diagnosis of Acute blood loss anemia.  She presents with the following impairments/functional limitations: weakness, impaired endurance, gait instability, impaired balance, decreased lower extremity function, decreased ROM, impaired skin, impaired cardiopulmonary response to activity, orthopedic precautions . Pt presents HOB with family at bed side. Daughter expressed concerns of hypertension. BP assessed as 145/70 in supine, also provided daughter with recent BP readings. Pt did c/o frequent headaches. Pt mobilized EOB requiring min A for scooting. STS t/f to rollator CGA. Pt denies dyspnea at this time. Gait trial performed with pt ambulating 120' CGA with rollator. Pt c/o R LE fatigue following gait trial. Pt appeared tachypneic, SpO2 assessed as 87%. Pt recovered shortly after without use of supplemental O2. RN notified.      Rehab Prognosis: Fair; patient would benefit from acute skilled PT services to address these deficits and reach maximum level of function.    Recent Surgery: Procedure(s) (LRB):  INCISION AND DRAINAGE, HEMATOMA (Left) 5 Days Post-Op    Plan:     During this hospitalization, patient to be seen 6 x/week to address the identified rehab impairments via gait training, therapeutic activities, therapeutic exercises, neuromuscular re-education and progress toward the following goals:    Plan of Care Expires:  03/04/24    Subjective     Chief Complaint: R LE fatigue  Patient/Family Comments/goals: Pt agreeable to PT.   Pain/Comfort:  Pain Rating 1: 0/10  Pain Rating Post-Intervention 1: 0/10      Objective:     Communicated with RN prior to session.  Patient found HOB elevated  with bed alarm, knee immobilizer, PureWick, peripheral IV, telemetry, wound vac, PICC line upon PT entry to room.     General Precautions: Standard, fall  Orthopedic Precautions: LLE weight bearing as tolerated  Braces: Knee immobilizer  Respiratory Status: Room air     Functional Mobility:  Bed Mobility:     Supine to Sit: minimum assistance  Transfers:     Sit to Stand:  contact guard assistance with 4 wheeled walker  Gait: 120' CGA /c rollator      AM-PAC 6 CLICK MOBILITY          Treatment & Education:  Pt was educated on the following: call light use, importance of OOB activity and functional mobility to negate the negative effects of prolonged bed rest during this hospitalization, safe transfers/ambulation and discharge planning recommendations/options.     Patient left up in chair with all lines intact, call button in reach, and family present..    GOALS:   Multidisciplinary Problems       Physical Therapy Goals          Problem: Physical Therapy    Goal Priority Disciplines Outcome Goal Variances Interventions   Physical Therapy Goal     PT, PT/OT Ongoing, Progressing     Description: Goals to be met by: 24     Patient will increase functional independence with mobility by performin. Supine to sit with [place]  2. Sit to stand transfer with Modified [place]  3. Gait  x 75 feet with Modified [place] using Rolling Walker.                          Time Tracking:     PT Received On: 02/10/24  PT Start Time: 1131     PT Stop Time: 1200  PT Total Time (min): 29 min     Billable Minutes: Gait Training 10 and Therapeutic Activity 19    Treatment Type: Treatment  PT/PTA: PTA     Number of PTA visits since last PT visit: 5     02/10/2024

## 2024-02-10 NOTE — SUBJECTIVE & OBJECTIVE
Interval History:     Review of Systems  Objective:     Vital Signs (Most Recent):  Temp: 98.1 °F (36.7 °C) (02/10/24 1200)  Pulse: 93 (02/10/24 1200)  Resp: 17 (02/10/24 1200)  BP: (!) 159/83 (02/10/24 1200)  SpO2: 95 % (02/10/24 1200) Vital Signs (24h Range):  Temp:  [97.8 °F (36.6 °C)-98.8 °F (37.1 °C)] 98.1 °F (36.7 °C)  Pulse:  [] 93  Resp:  [15-18] 17  SpO2:  [91 %-95 %] 95 %  BP: (135-189)/() 159/83     Weight: 115.8 kg (255 lb 4.8 oz)  Body mass index is 45.22 kg/m².    Intake/Output Summary (Last 24 hours) at 2/10/2024 1257  Last data filed at 2/10/2024 0930  Gross per 24 hour   Intake 470 ml   Output 750 ml   Net -280 ml         Physical Exam  Vitals and nursing note reviewed.   HENT:      Head: Normocephalic and atraumatic.   Eyes:      Conjunctiva/sclera: Conjunctivae normal.   Neck:      Vascular: No JVD.   Cardiovascular:      Heart sounds: Normal heart sounds.   Pulmonary:      Effort: Pulmonary effort is normal.   Abdominal:      Palpations: Abdomen is soft.   Musculoskeletal:         General: Normal range of motion.   Skin:     General: Skin is warm.   Neurological:      Mental Status: She is alert and oriented to person, place, and time.   Psychiatric:         Mood and Affect: Mood normal.             Significant Labs: All pertinent labs within the past 24 hours have been reviewed.  BMP:   Recent Labs   Lab 02/10/24  0426   GLU 90      K 3.9      CO2 30*   BUN 18   CREATININE 1.7*   CALCIUM 8.6*     CBC:   Recent Labs   Lab 02/09/24  0638 02/10/24  0426   WBC 5.99 7.27   HGB 8.6* 8.4*   HCT 28.6* 28.0*    272     CMP:   Recent Labs   Lab 02/09/24  0638 02/10/24  0426    139   K 4.2 3.9    105   CO2 25 30*   GLU 84 90   BUN 19 18   CREATININE 1.8* 1.7*   CALCIUM 8.5* 8.6*   PROT 5.8* 5.7*   ALBUMIN 3.3* 3.3*   BILITOT 0.5 0.4   ALKPHOS 94 92   AST 12 10   ALT 7* 8*   ANIONGAP 6* 4*       Significant Imaging: I have reviewed all pertinent imaging  results/findings within the past 24 hours.

## 2024-02-11 PROCEDURE — 99900035 HC TECH TIME PER 15 MIN (STAT)

## 2024-02-11 PROCEDURE — S4991 NICOTINE PATCH NONLEGEND: HCPCS | Performed by: INTERNAL MEDICINE

## 2024-02-11 PROCEDURE — 25000003 PHARM REV CODE 250: Performed by: INTERNAL MEDICINE

## 2024-02-11 PROCEDURE — 25000003 PHARM REV CODE 250: Performed by: STUDENT IN AN ORGANIZED HEALTH CARE EDUCATION/TRAINING PROGRAM

## 2024-02-11 PROCEDURE — 12000002 HC ACUTE/MED SURGE SEMI-PRIVATE ROOM

## 2024-02-11 PROCEDURE — 25000003 PHARM REV CODE 250: Performed by: ORTHOPAEDIC SURGERY

## 2024-02-11 PROCEDURE — 99900031 HC PATIENT EDUCATION (STAT)

## 2024-02-11 PROCEDURE — 94761 N-INVAS EAR/PLS OXIMETRY MLT: CPT

## 2024-02-11 PROCEDURE — 63600175 PHARM REV CODE 636 W HCPCS: Mod: JZ | Performed by: INTERNAL MEDICINE

## 2024-02-11 RX ADMIN — CLONIDINE HYDROCHLORIDE 0.1 MG: 0.1 TABLET ORAL at 07:02

## 2024-02-11 RX ADMIN — ATORVASTATIN CALCIUM 10 MG: 10 TABLET, FILM COATED ORAL at 09:02

## 2024-02-11 RX ADMIN — FLUOXETINE HYDROCHLORIDE 40 MG: 20 CAPSULE ORAL at 09:02

## 2024-02-11 RX ADMIN — AMLODIPINE BESYLATE 5 MG: 5 TABLET ORAL at 09:02

## 2024-02-11 RX ADMIN — NICOTINE 1 PATCH: 21 PATCH, EXTENDED RELEASE TRANSDERMAL at 09:02

## 2024-02-11 RX ADMIN — AMIODARONE HYDROCHLORIDE 200 MG: 200 TABLET ORAL at 09:02

## 2024-02-11 RX ADMIN — BUPROPION HYDROCHLORIDE 150 MG: 150 TABLET, EXTENDED RELEASE ORAL at 09:02

## 2024-02-11 RX ADMIN — METOPROLOL SUCCINATE 25 MG: 25 TABLET, EXTENDED RELEASE ORAL at 09:02

## 2024-02-11 RX ADMIN — TRAZODONE HYDROCHLORIDE 100 MG: 50 TABLET ORAL at 09:02

## 2024-02-11 RX ADMIN — CETIRIZINE HYDROCHLORIDE 10 MG: 10 TABLET, FILM COATED ORAL at 09:02

## 2024-02-11 RX ADMIN — LABETALOL HYDROCHLORIDE 10 MG: 5 INJECTION, SOLUTION INTRAVENOUS at 04:02

## 2024-02-11 NOTE — PROGRESS NOTES
Pharmacy Consult Discontinuation: Vancomycin    Iris Mueller [id number] is a 68 y.o. female was consulted for vancomycin pharmacotherapy management by pharmacy.    Pharmacy consult for vancomycin dosing is no longer required.  Vancomycin was discontinued 02/10/2024.    Thank you for allowing us to participate in this patient's care. Should you have any questions or concerns please feel free to contact the pharmacy department at [phone].    Chito Posada, PharmD

## 2024-02-11 NOTE — CARE UPDATE
02/11/24 0818   Patient Assessment/Suction   Level of Consciousness (AVPU) alert   Respiratory Effort Normal;Unlabored   Expansion/Accessory Muscles/Retractions no use of accessory muscles;no retractions;expansion symmetric   All Lung Fields Breath Sounds clear   Rhythm/Pattern, Respiratory unlabored;pattern regular;depth regular;chest wiggle adequate;no shortness of breath reported   Cough Frequency no cough   PRE-TX-O2   Device (Oxygen Therapy) room air   SpO2 97 %   Pulse Oximetry Type Intermittent   $ Pulse Oximetry - Multiple Charge Pulse Oximetry - Multiple   Pulse 98   Resp 19   Aerosol Therapy   $ Aerosol Therapy Charges PRN treatment not required   Education   $ Education Bronchodilator;15 min

## 2024-02-11 NOTE — PROGRESS NOTES
[facility] Medicine  Progress Note    Patient Name: Iris Mueller  MRN: [id number]  Patient Class: IP- Inpatient   Admission Date: 2/3/2024  Length of Stay: 8 days  Attending Physician: Ruy Benavides MD  Primary Care Provider: Elkin You MD        Subjective:     Principal Problem:Acute blood loss anemia        HPI:  No notes on file    Overview/Hospital Course:  2/8  Assumed care. No signs of bleeding . Status post debridement. Getting physical therapy. Appeared to be iron-deficiency and ordered 1 unit of iron infusion. Add  folate level and occult blood     2/9   Hb better . EYAD , gave iron infusion and Hb better. PT recommend SNF and working . NO cp or SOB    2/10  Hb stable . Moving toes. Decided to go SNF and awaited     2/11  Less leg swelling . No CP or SOB . Sitting in chair     Interval History:     Review of Systems  Objective:     Vital Signs (Most Recent):  Temp: 98.9 °F (37.2 °C) (02/11/24 0850)  Pulse: 94 (02/11/24 0922)  Resp: 18 (02/11/24 0850)  BP: (!) 149/93 (02/11/24 0922)  SpO2: (!) 94 % (02/11/24 0850) Vital Signs (24h Range):  Temp:  [98 °F (36.7 °C)-98.9 °F (37.2 °C)] 98.9 °F (37.2 °C)  Pulse:  [88-98] 94  Resp:  [18-19] 18  SpO2:  [94 %-98 %] 94 %  BP: (143-173)/(67-93) 149/93     Weight: 115.8 kg (255 lb 4.8 oz)  Body mass index is 45.22 kg/m².    Intake/Output Summary (Last 24 hours) at 2/11/2024 1207  Last data filed at 2/11/2024 0454  Gross per 24 hour   Intake 440 ml   Output 300 ml   Net 140 ml         Physical Exam  Vitals and nursing note reviewed.   HENT:      Head: Normocephalic and atraumatic.   Eyes:      Conjunctiva/sclera: Conjunctivae normal.   Neck:      Vascular: No JVD.   Cardiovascular:      Heart sounds: Normal heart sounds.   Pulmonary:      Effort: Pulmonary effort is normal.   Abdominal:      Palpations: Abdomen is soft.   Musculoskeletal:         General: Normal range of motion.   Skin:     General: Skin is warm.  "  Neurological:      Mental Status: She is alert and oriented to person, place, and time.   Psychiatric:         Mood and Affect: Mood normal.             Significant Labs: All pertinent labs within the past 24 hours have been reviewed.  CBC:   Recent Labs   Lab 02/10/24  0426   WBC 7.27   HGB 8.4*   HCT 28.0*        CMP:   Recent Labs   Lab 02/10/24  0426      K 3.9      CO2 30*   GLU 90   BUN 18   CREATININE 1.7*   CALCIUM 8.6*   PROT 5.7*   ALBUMIN 3.3*   BILITOT 0.4   ALKPHOS 92   AST 10   ALT 8*   ANIONGAP 4*     Cardiac Markers: No results for input(s): "CKMB", "MYOGLOBIN", "BNP", "TROPISTAT" in the last 48 hours.    Significant Imaging: I have reviewed all pertinent imaging results/findings within the past 24 hours.    Assessment/Plan:      * Acute blood loss anemia  Patient's anemia is currently uncontrolled. Has received 2 units of PRBCs on 2/7/2024 . Etiology likely d/t acute blood loss which was from wound  Current CBC reviewed-             Lab Results   Component Value Date     HGB 7.0 (L) 02/07/2024     HCT 23.7 (L) 02/07/2024      Monitor serial CBC and transfuse if patient becomes hemodynamically unstable, symptomatic or H/H drops below 7/21.     Transfused  1 unit of packed cells for symptoms of anemia and repeat one more   Add folate and occult blood   I. DC to SNF tomorrow   . Follow PT . Patient not keen to go to facility   Need GI follow up as OP      Traumatic hematoma of knee, left, initial encounter     Continue ceftriaxone and vancomycin      S/P debridement with a wound vac in place   No intra op culture sent   VTE Risk Mitigation (From admission, onward)           Ordered     IP VTE LOW RISK PATIENT  Once         02/05/24 1449     Place sequential compression device  Until discontinued         02/05/24 1449                    Discharge Planning   VALERIANO: 2/12/2024     Code Status: Full Code   Is the patient medically ready for discharge?:     Reason for patient still in " hospital (select all that apply): Treatment  Discharge Plan A: Skilled Nursing Facility   Discharge Delays: None known at this time              Ruy Benavides MD  Department of Hospital Medicine   [facility]

## 2024-02-11 NOTE — SUBJECTIVE & OBJECTIVE
"Interval History:     Review of Systems  Objective:     Vital Signs (Most Recent):  Temp: 98.9 °F (37.2 °C) (02/11/24 0850)  Pulse: 94 (02/11/24 0922)  Resp: 18 (02/11/24 0850)  BP: (!) 149/93 (02/11/24 0922)  SpO2: (!) 94 % (02/11/24 0850) Vital Signs (24h Range):  Temp:  [98 °F (36.7 °C)-98.9 °F (37.2 °C)] 98.9 °F (37.2 °C)  Pulse:  [88-98] 94  Resp:  [18-19] 18  SpO2:  [94 %-98 %] 94 %  BP: (143-173)/(67-93) 149/93     Weight: 115.8 kg (255 lb 4.8 oz)  Body mass index is 45.22 kg/m².    Intake/Output Summary (Last 24 hours) at 2/11/2024 1207  Last data filed at 2/11/2024 0454  Gross per 24 hour   Intake 440 ml   Output 300 ml   Net 140 ml         Physical Exam  Vitals and nursing note reviewed.   HENT:      Head: Normocephalic and atraumatic.   Eyes:      Conjunctiva/sclera: Conjunctivae normal.   Neck:      Vascular: No JVD.   Cardiovascular:      Heart sounds: Normal heart sounds.   Pulmonary:      Effort: Pulmonary effort is normal.   Abdominal:      Palpations: Abdomen is soft.   Musculoskeletal:         General: Normal range of motion.   Skin:     General: Skin is warm.   Neurological:      Mental Status: She is alert and oriented to person, place, and time.   Psychiatric:         Mood and Affect: Mood normal.             Significant Labs: All pertinent labs within the past 24 hours have been reviewed.  CBC:   Recent Labs   Lab 02/10/24  0426   WBC 7.27   HGB 8.4*   HCT 28.0*        CMP:   Recent Labs   Lab 02/10/24  0426      K 3.9      CO2 30*   GLU 90   BUN 18   CREATININE 1.7*   CALCIUM 8.6*   PROT 5.7*   ALBUMIN 3.3*   BILITOT 0.4   ALKPHOS 92   AST 10   ALT 8*   ANIONGAP 4*     Cardiac Markers: No results for input(s): "CKMB", "MYOGLOBIN", "BNP", "TROPISTAT" in the last 48 hours.    Significant Imaging: I have reviewed all pertinent imaging results/findings within the past 24 hours.  "

## 2024-02-12 VITALS
SYSTOLIC BLOOD PRESSURE: 148 MMHG | HEART RATE: 89 BPM | OXYGEN SATURATION: 97 % | DIASTOLIC BLOOD PRESSURE: 76 MMHG | WEIGHT: 255.31 LBS | HEIGHT: 63 IN | BODY MASS INDEX: 45.24 KG/M2 | RESPIRATION RATE: 18 BRPM | TEMPERATURE: 98 F

## 2024-02-12 LAB — TB INDURATION 48 - 72 HR READ: 0 MM

## 2024-02-12 PROCEDURE — S4991 NICOTINE PATCH NONLEGEND: HCPCS | Performed by: INTERNAL MEDICINE

## 2024-02-12 PROCEDURE — 99900031 HC PATIENT EDUCATION (STAT)

## 2024-02-12 PROCEDURE — 94761 N-INVAS EAR/PLS OXIMETRY MLT: CPT

## 2024-02-12 PROCEDURE — 25000003 PHARM REV CODE 250: Performed by: ORTHOPAEDIC SURGERY

## 2024-02-12 PROCEDURE — 99900035 HC TECH TIME PER 15 MIN (STAT)

## 2024-02-12 PROCEDURE — 25000003 PHARM REV CODE 250: Performed by: INTERNAL MEDICINE

## 2024-02-12 RX ORDER — CLONIDINE HYDROCHLORIDE 0.1 MG/1
0.1 TABLET ORAL EVERY 8 HOURS PRN
Qty: 24 TABLET | Refills: 0 | Status: SHIPPED | OUTPATIENT
Start: 2024-02-12 | End: 2024-02-12

## 2024-02-12 RX ORDER — CLONIDINE HYDROCHLORIDE 0.1 MG/1
0.1 TABLET ORAL EVERY 8 HOURS PRN
Qty: 24 TABLET | Refills: 0 | Status: SHIPPED | OUTPATIENT
Start: 2024-02-12 | End: 2024-02-22

## 2024-02-12 RX ADMIN — FLUOXETINE HYDROCHLORIDE 40 MG: 20 CAPSULE ORAL at 08:02

## 2024-02-12 RX ADMIN — METOPROLOL SUCCINATE 25 MG: 25 TABLET, EXTENDED RELEASE ORAL at 08:02

## 2024-02-12 RX ADMIN — BUPROPION HYDROCHLORIDE 150 MG: 150 TABLET, EXTENDED RELEASE ORAL at 08:02

## 2024-02-12 RX ADMIN — AMIODARONE HYDROCHLORIDE 200 MG: 200 TABLET ORAL at 08:02

## 2024-02-12 RX ADMIN — OXYCODONE HYDROCHLORIDE 5 MG: 5 TABLET ORAL at 02:02

## 2024-02-12 RX ADMIN — ACETAMINOPHEN 650 MG: 325 TABLET ORAL at 02:02

## 2024-02-12 RX ADMIN — NICOTINE 1 PATCH: 21 PATCH, EXTENDED RELEASE TRANSDERMAL at 08:02

## 2024-02-12 RX ADMIN — CETIRIZINE HYDROCHLORIDE 10 MG: 10 TABLET, FILM COATED ORAL at 08:02

## 2024-02-12 NOTE — DISCHARGE SUMMARY
[facility] Medicine  Discharge Summary      Patient Name: Iris Mueller  MRN: [id number]  BONIFACIO: [id number]  Patient Class: IP- Inpatient  Admission Date: 2/3/2024  Hospital Length of Stay: 9 days  Discharge Date and Time:  02/12/2024 4:43 PM  Attending Physician: Ruy Benavides MD   Discharging Provider: Ruy Benavides MD  Primary Care Provider: Elkin You MD    Primary Care Team: Networked reference to record PCT     HPI:   No notes on file    Procedure(s) (LRB):  INCISION AND DRAINAGE, HEMATOMA (Left)      Hospital Course:   69 y/o F hx PAF, aortic stenosis, aortic regurgitaiton, a history of mildly reduced systolic function with recovery - most recent LVEF 50-55%, pulmonary hypertension, nonobstructive coronary artery disease of the LAD and RCA, hypertension, hyperlipidemia, COPD, osteoarthritis, and obesity was admitted with fall injury and left knee hematoma.  On 1/12/24, pt fell and hit floor with her forehead, left knee and left wrist. She went to ER that day and had 15 sutures placed.CT head and C-spine w/o fx, XRays of hip and knee without fx. Pt has had sutures removed as outpatient. She saw her family medicine provder and blood  work done and showed Hgb 6.9 and sent here .  Patient was transfused with a blood and hemoglobin is stable.  Anemia profile consistent with iron-deficiency anemia and IV iron was given.  Later patient was seen by orthopedic surgeon and brought to the OR for the necrotic area of the skin secondary to traumatic hematoma was deprived a wound VAC placed and patient was discharged in stable condition with HH and wound care.  Plan to place her to skilled nursing facility but later patient declined and wanted to go home.  Follow up given with orthopedic and GI as outpatient.       Goals of Care Treatment Preferences:  Code Status: Full Code      Consults:   Consults (From admission, onward)          Status Ordering Provider     Inpatient consult to    Once        Provider:  (Not yet assigned)    Completed EDWARD LOYD     Inpatient consult to PICC Line Nurse  Once        Provider:  (Not yet assigned)    Acknowledged BRYSON WILCOX     Inpatient consult to   Once        Provider:  (Not yet assigned)    Completed YANNICK LECHUGA     Inpatient consult to Orthopedics  Once        Provider:  Bryson Wilcox MD    Completed YANNICK LECHUGA            No new Assessment & Plan notes have been filed under this hospital service since the last note was generated.  Service: Hospital Medicine    Final Active Diagnoses:    Diagnosis Date Noted POA    PRINCIPAL PROBLEM:  Acute blood loss anemia [D62] 08/31/2019 Yes    Traumatic hematoma of knee, left, initial encounter [S80.02XA] 02/04/2024 Yes      Problems Resolved During this Admission:       Discharged Condition: good    Disposition: Home-Health Care [facility]    Follow Up:   Follow-up Information       Elkin You MD Follow up in 1 month(s).    Specialties: Family Medicine, Home Health Services, Hospice Services  Contact information:  [address]  [address]  [place] [address]  [phone]               Bryson Wilcox MD Follow up in 1 week(s).    Specialties: Sports Medicine, Orthopedic Surgery  Contact information:  [address] [place] [address] [address]  [address]  [place] [address]  [phone]                           Patient Instructions:      Ambulatory referral/consult to Home Health   Standing Status: Future   Referral Priority: Routine Referral Type: Home Health   Referral Reason: Specialty Services Required   Requested Specialty: Home Health Services   Number of Visits Requested: 1     Ambulatory referral/consult to Home Health   Standing Status: Future   Referral Priority: Routine Referral Type: Home Health   Referral Reason: Specialty Services Required   Requested Specialty: Home Health Services   Number of Visits Requested: 1     Ambulatory  "referral/consult to Home Health   Standing Status: Future   Referral Priority: Routine Referral Type: Home Health   Referral Reason: Specialty Services Required   Requested Specialty: Home Health Services   Number of Visits Requested: 1     Diet Cardiac     Diet Cardiac     Reason for not Ordering Smoking Cessation Referral     Order Specific Question Answer Comments   Reason for not ordering: Not medically appropriate at this time      Reason for not Prescribing Nicotine Replacement     Order Specific Question Answer Comments   Reason for not Prescribing: Not medically appropriate at this time      Activity as tolerated     Activity as tolerated       Significant Diagnostic Studies: Labs: CMP No results for input(s): "NA", "K", "CL", "CO2", "GLU", "BUN", "CREATININE", "CALCIUM", "PROT", "ALBUMIN", "BILITOT", "ALKPHOS", "AST", "ALT", "ANIONGAP", "ESTGFRAFRICA", "EGFRNONAA" in the last 48 hours. and CBC No results for input(s): "WBC", "HGB", "HCT", "PLT" in the last 48 hours.    Pending Diagnostic Studies:       None           Medications:  Reconciled Home Medications:      Medication List        START taking these medications      cloNIDine 0.1 MG tablet  Commonly known as: CATAPRES  Take 1 tablet (0.1 mg total) by mouth every 8 (eight) hours as needed (if systolic BP more than 185).     oxyCODONE 5 MG immediate release tablet  Commonly known as: ROXICODONE  Take 3 tablets (15 mg total) by mouth every 6 (six) hours as needed for Pain.            CONTINUE taking these medications      albuterol 90 mcg/actuation inhaler  Commonly known as: PROVENTIL/VENTOLIN HFA  Inhale 2 puffs into the lungs every 6 (six) hours as needed for Wheezing. Rescue     amiodarone 200 MG Tab  Commonly known as: PACERONE  Take 1 tablet (200 mg total) by mouth once daily.     amLODIPine 2.5 MG tablet  Commonly known as: NORVASC  Take 1 tablet (2.5 mg total) by mouth every evening.     atorvastatin 10 MG tablet  Commonly known as: LIPITOR  Take " 1 tablet (10 mg total) by mouth every evening.     betamethasone dipropionate 0.05 % cream  Apply topically 2 (two) times daily.     buPROPion 150 MG TBSR 12 hr tablet  Commonly known as: WELLBUTRIN SR  Take 1 tablet (150 mg total) by mouth 2 (two) times daily.     ELIQUIS 5 mg Tab  Generic drug: apixaban  Take 1 tablet (5 mg total) by mouth 2 (two) times daily.     ergocalciferol 50,000 unit Cap  Commonly known as: ERGOCALCIFEROL  Take 50,000 Units by mouth every 7 days.     FLUoxetine 40 MG capsule  Take 1 capsule (40 mg total) by mouth once daily.     gabapentin 300 MG capsule  Commonly known as: NEURONTIN  Take 1 capsule (300 mg total) by mouth 3 (three) times daily.     HYDROcodone-acetaminophen 5-325 mg per tablet  Commonly known as: NORCO  Take 1 tablet by mouth every 6 (six) hours as needed for Pain.     loratadine 10 mg tablet  Commonly known as: CLARITIN  Take 10 mg by mouth once daily.     metoprolol succinate 25 MG 24 hr tablet  Commonly known as: TOPROL-XL  Take 2 tablets (50 mg total) by mouth once daily.     mupirocin 2 % ointment  Commonly known as: BACTROBAN  Apply topically 2 (two) times daily.     traZODone 50 MG tablet  Commonly known as: DESYREL  Take 2 tablets (100 mg total) by mouth nightly as needed for Insomnia.              Indwelling Lines/Drains at time of discharge:   Lines/Drains/Airways       Peripherally Inserted Central Catheter Line  Duration             PICC Double Lumen 02/05/24 1057 left basilic 7 days              Drain  Duration             Female External Urinary Catheter w/ Suction 02/10/24 0611 2 days                  General: Patient resting comfortably in no acute distress. Appears as stated age. Calm  Eyes: EOM intact. No conjunctivae injection. No scleral icterus.  ENT: Hearing grossly intact. No discharge from ears. No nasal discharge.   CVS: RRR. No LE edema BL.  Lungs: CTA BL, no wheezing or crackles. Good breath sounds. No accessory muscle use. No acute respiratory  distress  Neuro: non focal , Follows commands. Responds appropriately       Time spent on the discharge of patient: 23 minutes         Ruy Benavides MD  Department of Hospital Medicine  [facility]

## 2024-02-12 NOTE — CARE UPDATE
02/12/24 0902   Patient Assessment/Suction   Level of Consciousness (AVPU) alert   Respiratory Effort Normal;Unlabored   Expansion/Accessory Muscles/Retractions no use of accessory muscles;no retractions;expansion symmetric   All Lung Fields Breath Sounds clear   Rhythm/Pattern, Respiratory unlabored;depth regular;pattern regular   Cough Frequency no cough   PRE-TX-O2   SpO2 (!) 93 %   Pulse Oximetry Type Intermittent   $ Pulse Oximetry - Multiple Charge Pulse Oximetry - Multiple   Pulse 84   Resp 18   Aerosol Therapy   $ Aerosol Therapy Charges PRN treatment not required   Education   $ Education Bronchodilator;15 min

## 2024-02-12 NOTE — PLAN OF CARE
IMM signed with patient at bedside. Patient verbalized an understanding and was given copy.    02/12/24 2224   Medicare Message   Important Message from Medicare regarding Discharge Appeal Rights Given to patient/caregiver;Explained to patient/caregiver;Signed/date by patient/caregiver   Date IMM was signed 02/12/24   Time IMM was signed [id number]

## 2024-02-12 NOTE — PT/OT/SLP PROGRESS
Occupational Therapy      Patient Name:  Iris Mueller   MRN:  [id number]    Patient not seen today secondary to  (Patient to d/c to SNF today.). Will follow-up tomorrow if not d/c to SNF.    2/12/2024   no

## 2024-02-12 NOTE — PT/OT/SLP PROGRESS
Physical Therapy      Patient Name:  Iris Mueller   MRN:  [id number]    Patient not seen today secondary to  (Scheduled  for DC to SNF today).

## 2024-02-12 NOTE — PROGRESS NOTES
"[facility]  Adult Nutrition   Progress Note (Initial Assessment)     SUMMARY     Recommendations  Recommendation/Intervention: 1. Continue Cardiac diet as tolerated. 2. Kyler BID to assist with wound healing.   Goals: 1. Intake will continue to be >/= 50% EEN / EPN by follow up.    Nutrition Diagnosis PES Statement: Increased nutrient needs related to decreased skin integrity as evidenced by large hematoma s/p INCISION AND DRAINAGE, HEMATOMA (Left) 2/05/24. Patient with  application of negative pressure wound VAC.  Wound size 6 centimeters long by 6 centimeters wide. Followed by WC nurse.    Dietitian Rounds Brief  Patent LOS with dc orders for today on chart. Was to dc on Friday. Patient with good intake. RD addend Kyler BID for healing.  Social Determinants of Health: patient lives at home with family support and plans for home health assistance.    Diet order:   Current Diet Order: Cardiac diet                 Evaluation of Received Nutrient/Fluid Intake  Energy Calories Required: meeting needs  Protein Required: meeting needs  Fluid Required: not meeting needs  Tolerance: tolerating     % Intake of Estimated Energy Needs: 50 - 75 %  % Meal Intake: 50 - 75 %      Intake/Output Summary (Last 24 hours) at 2/12/2024 1613  Last data filed at 2/12/2024 1458  Gross per 24 hour   Intake 480 ml   Output --   Net 480 ml        Anthropometrics  Temp: 97.8 °F (36.6 °C)  Height Method: Stated  Height: 5' 3" (160 cm)  Height (inches): 63 in  Weight Method: Bed Scale  Weight: 115.8 kg (255 lb 4.8 oz)  Weight (lb): 255.3 lb  Ideal Body Weight (IBW), Female: 115 lb  % Ideal Body Weight, Female (lb): 222 %  BMI (Calculated): 45.2       Estimated/Assessed Needs  Weight Used For Calorie Calculations: 115.8 kg (255 lb 4.7 oz)  Energy Calorie Requirements (kcal): 2058 kcal / day (18 kcal/kg)  Energy Need Method: Fred-St Sy (MSJ x 1.25 minus 0)  Protein Requirements:  gm/day 1.5-2.0 gm/kg)  Weight Used For " Protein Calculations: 52 kg (114 lb 10.2 oz)     Estimated Fluid Requirement Method: RDA Method  RDA Method (mL): 2058       Reason for Assessment  Reason For Assessment: length of stay  Diagnosis:  (acute blood loss anemia)  Relevant Medical History: COPD, anemia, obesity  Interdisciplinary Rounds: did not attend  Nutrition Discharge Planning: Pending    Nutrition/Diet History  Spiritual, Cultural Beliefs, Druze Practices, Values that Affect Care: no  Food Allergies: NKFA  Factors Affecting Nutritional Intake: None identified at this time    Nutrition Risk Screen  Nutrition Risk Screen: no indicators present       Altered Skin Integrity 02/04/24 0311 Left Knee Traumatic-Wound Image: Images linked       Altered Skin Integrity 02/04/24 0313 Forehead Traumatic-Wound Image: Images linked  MST Score: 0  Have you recently lost weight without trying?: No  Weight loss score: 0  Have you been eating poorly because of a decreased appetite?: No  Appetite score: 0       Weight History:  Wt Readings from Last 5 Encounters:   02/04/24 115.8 kg (255 lb 4.8 oz)   01/29/24 112 kg (247 lb)   01/22/24 108.4 kg (239 lb)   01/12/24 99.8 kg (220 lb)   01/05/24 100.1 kg (220 lb 12.7 oz)        Lab/Procedures/Meds: Pertinent Labs/Meds Reviewed    Medications:Pertinent Medications Reviewed  Scheduled Meds:   amiodarone  200 mg Oral Daily    amLODIPine  5 mg Oral QHS    atorvastatin  10 mg Oral QHS    buPROPion  150 mg Oral BID    cetirizine  10 mg Oral Daily    FLUoxetine  40 mg Oral Daily    metoprolol succinate  25 mg Oral BID    nicotine  1 patch Transdermal Daily     Continuous Infusions:  PRN Meds:.0.9%  NaCl infusion (for blood administration), 0.9%  NaCl infusion (for blood administration), 0.9%  NaCl infusion (for blood administration), acetaminophen, albuterol sulfate, cloNIDine, dextrose 50%, dextrose 50%, diphenhydrAMINE, gabapentin, glucagon (human recombinant), glucose, glucose, hydrALAZINE, HYDROmorphone, labetalol,  "melatonin, naloxone, ondansetron, ondansetron, oxyCODONE, sodium chloride 0.9%, sodium chloride 0.9%, traZODone    Labs: Pertinent Labs Reviewed  Clinical Chemistry:  Recent Labs   Lab 02/08/24  0542 02/09/24  0638 02/10/24  0426    136 139   K 4.3 4.2 3.9    105 105   CO2 27 25 30*   GLU 88 84 90   BUN 24* 19 18   CREATININE 1.9* 1.8* 1.7*   CALCIUM 8.4* 8.5* 8.6*   PROT 5.6* 5.8* 5.7*   ALBUMIN 3.1* 3.3* 3.3*   BILITOT 0.4 0.5 0.4   ALKPHOS 91 94 92   AST 9* 12 10   ALT 7* 7* 8*   ANIONGAP 5* 6* 4*     CBC:   Recent Labs   Lab 02/10/24  0426   WBC 7.27   RBC 3.16*   HGB 8.4*   HCT 28.0*      MCV 89   MCH 26.6*   MCHC 30.0*     Lipid Panel:  No results for input(s): "CHOL", "HDL", "LDLCALC", "TRIG", "CHOLHDL" in the last 168 hours.  Cardiac Profile:  No results for input(s): "BNP", "CPK", "CPKMB", "TROPONINI", "CKTOTAL" in the last 168 hours.  Inflammatory Labs:  No results for input(s): "CRP" in the last 168 hours.  Diabetes:  No results for input(s): "HGBA1C", "POCTGLUCOSE" in the last 168 hours.  Thyroid & Parathyroid:  No results for input(s): "TSH", "FREET4", "Z4MIVZO", "R8BCNBN", "THYROIDAB" in the last 168 hours.    Monitor and Evaluation  Food and Nutrient Intake: energy intake, food and beverage intake  Food and Nutrient Adminstration: diet order  Knowledge/Beliefs/Attitudes: food and nutrition knowledge/skill  Physical Activity and Function: nutrition-related ADLs and IADLs  Anthropometric Measurements: weight change, body mass index, weight  Biochemical Data, Medical Tests and Procedures: electrolyte and renal panel, gastrointestinal profile, glucose/endocrine profile, inflammatory profile, lipid profile  Nutrition-Focused Physical Findings: overall appearance     Nutrition Risk  Level of Risk/Frequency of Follow-up:  (1 x / week)     Nutrition Follow-Up  RD Follow-up?: Yes      Guadalupe Walsh, BRADLY, LDN 02/12/2024 4:13 PM     "

## 2024-02-12 NOTE — PLAN OF CARE
This morning KINGA spoke to Karen with [facility] who confirmed they would be able to admit today. Patient informed MD she no longer wanted to go to rehab and decided she wished to discharge home instead. Karen was informed. Patient signed Patient Choice Form and chose [facility]. KINGA sent referral with discharge orders via [facility] and received confirmation SOC 2/14/24. HH awaiting wound care orders.

## 2024-02-13 PROCEDURE — G0180 MD CERTIFICATION HHA PATIENT: HCPCS | Mod: ,,, | Performed by: ORTHOPAEDIC SURGERY

## 2024-02-13 NOTE — PLAN OF CARE
Patient cleared for discharge from case management standpoint.    Final Orders sent to [facility]  in [facility], and Pt accepted.    Chart and discharge orders reviewed.  Patient discharged home with no further case management needs.     02/12/24 1810   Final Note   Assessment Type Final Discharge Note   Anticipated Discharge Disposition Home-Health   Post-Acute Status   Post-Acute Authorization Home Health   Home Health Status Set-up Complete/Auth obtained   Discharge Delays None known at this time

## 2024-02-13 NOTE — NURSING
Called Dr. Huerta to clarify orders after excisional vacc complete. Reports daughter instructed by him on how to remove when batteries ran out. [facility] to do daily dressing change to Lt. Knee incision beginning Tues, 02/13/24 as follows: Remove Knee Brace. Gently Cleanse incision with NS and gauze. Pat dry with gauze.  Apply double layered Xeroform gauze over incision, then folded dry gauze. Wrap with roll gauze, secure with tape and then reapply elastic bandage.Reapply knee brace and make sure to pad between dressing and brace to prevent from rubbing skin.

## 2024-02-14 ENCOUNTER — TELEPHONE (OUTPATIENT)
Dept: FAMILY MEDICINE | Facility: CLINIC | Age: 69
End: 2024-02-14
Payer: MEDICARE

## 2024-02-14 ENCOUNTER — PATIENT OUTREACH (OUTPATIENT)
Dept: FAMILY MEDICINE | Facility: CLINIC | Age: 69
End: 2024-02-14
Payer: MEDICARE

## 2024-02-14 NOTE — TELEPHONE ENCOUNTER
----- Message from Siri Moralez LPN sent at 2/9/2024 11:44 AM CST -----  Regarding: HFU  Call patient - needs post-hospital phone call within 2 business days and hospital follow up visit scheduled within 7-14 days.

## 2024-02-14 NOTE — TELEPHONE ENCOUNTER
Spoke with patient and states she today is not a good day for her. Using muscles that she has not used before and unable to use her leg. Yes, medication changes and picked up.   Durable medical equipment brace, knee band. Had walker already. [facility] already came out to see patient. Due to follow up with orthopedics and Dr. You.   Patient out reach created and hospital follow up scheduled.

## 2024-02-15 NOTE — PROGRESS NOTES
Discharge Information     Discharge Date:   2/12/24    Primary Discharge Diagnosis:  Acute Blood loss anemia      Discharge Summary:  Reviewed      Medication & Order Review     Were medication changes made or new medications added?   Yes    If so, has the patient filled the prescriptions?  Yes     Was Home Health ordered? Yes    If so, has Home Health contacted patient and/or initiated services?  Yes    Name of Home Health Agency?  [facility]     Durable Medical Equipment ordered?  Yes     If so, has the DME provider contacted patient and delivered equipment?  N/A    Follow Up               Any problems since discharge? Yes    How is the patient feeling since returning home?  Please see below    Have you set up recommended follow up appointments?  (cardiology, surgery, etc.)    Schedule Hospital Follow-up appointment within 7-14 days (preferably 7).      Notes:  Spoke with patient and states she today is not a good day for her. Using muscles that she has not used before and unable to use her leg. Yes, medication changes and picked up.   Durable medical equipment brace, knee band. Had walker already. [facility] already came out to see patient. Due to follow up with orthopedics and Dr. You.            Lupe Xavier

## 2024-02-20 ENCOUNTER — PATIENT OUTREACH (OUTPATIENT)
Dept: ADMINISTRATIVE | Facility: CLINIC | Age: 69
End: 2024-02-20
Payer: MEDICARE

## 2024-02-20 NOTE — PROGRESS NOTES
C3 nurse spoke with Iris Mueller for a TCC post hospital discharge follow up call. The patient has a scheduled HOSPFU  with Dr MAYI You on 2/26/24 @1140am

## 2024-02-22 ENCOUNTER — OFFICE VISIT (OUTPATIENT)
Dept: ORTHOPEDICS | Facility: CLINIC | Age: 69
End: 2024-02-22
Payer: MEDICARE

## 2024-02-22 VITALS — HEIGHT: 63 IN | RESPIRATION RATE: 18 BRPM | WEIGHT: 255 LBS | BODY MASS INDEX: 45.18 KG/M2

## 2024-02-22 DIAGNOSIS — S81.002D OPEN KNEE WOUND, LEFT, SUBSEQUENT ENCOUNTER: Primary | ICD-10-CM

## 2024-02-22 PROCEDURE — 99024 POSTOP FOLLOW-UP VISIT: CPT | Mod: S$GLB,,, | Performed by: ORTHOPAEDIC SURGERY

## 2024-02-22 PROCEDURE — 99999 PR PBB SHADOW E&M-EST. PATIENT-LVL III: CPT | Mod: PBBFAC,,, | Performed by: ORTHOPAEDIC SURGERY

## 2024-02-22 NOTE — PROGRESS NOTES
Past Medical History:   Diagnosis Date    Anemia, unspecified     Arthritis     Asthma     COPD (chronic obstructive pulmonary disease)     Encounter for blood transfusion     Hypertension     Obese body habitus     Wound infection 2019    Right knee       Past Surgical History:   Procedure Laterality Date    ANGIOGRAM, CORONARY, WITH LEFT HEART CATHETERIZATION N/A 2022    Procedure: Angiogram, Coronary, with Left Heart Cath;  Surgeon: Jorge Tran MD;  Location: [facility] CATH/EP LAB;  Service: Cardiology;  Laterality: N/A;     SECTION      ECHOCARDIOGRAM,TRANSESOPHAGEAL N/A 2023    Procedure: Transesophageal echo (MARIANO) intra-procedure log documentation;  Surgeon: Provider, Dos Diagnostic;  Location: [facility] EP LAB;  Service: Cardiology;  Laterality: N/A;    INCISION AND DRAINAGE OF HEMATOMA Left 2024    Procedure: INCISION AND DRAINAGE, HEMATOMA;  Surgeon: Bryson Huerta MD;  Location: [facility] OR;  Service: Orthopedics;  Laterality: Left;    JOINT REPLACEMENT      Knee    KNEE ARTHROPLASTY Right 2019    Procedure: ARTHROPLASTY, KNEE;  Surgeon: Delio Chung MD;  Location: [facility] OR;  Service: Orthopedics;  Laterality: Right;  anesthesia:  general and block    REPLACEMENT OF WOUND VACUUM-ASSISTED CLOSURE DEVICE Right 2019    Procedure: REPLACEMENT, WOUND VAC;  Surgeon: Delio Chung MD;  Location: [facility] OR;  Service: Orthopedics;  Laterality: Right;    TONSILLECTOMY      TREATMENT OF CARDIAC ARRHYTHMIA N/A 2023    Procedure: Cardioversion or Defibrillation;  Surgeon: PJ Betancourt MD;  Location: [facility] EP LAB;  Service: Cardiology;  Laterality: N/A;  AF, MARIANO, DCCV, MAC, EH, 3 Prep    VENTRICULOGRAM, LEFT  2022    Procedure: Ventriculogram, Left;  Surgeon: Jorge Tran MD;  Location: [facility] CATH/EP LAB;  Service: Cardiology;;       Current Outpatient Medications   Medication Sig    albuterol (PROVENTIL/VENTOLIN HFA) 90 mcg/actuation inhaler Inhale 2 puffs  into the lungs every 6 (six) hours as needed for Wheezing. Rescue (Patient not taking: Reported on 2/20/2024)    amiodarone (PACERONE) 200 MG Tab Take 1 tablet (200 mg total) by mouth once daily.    amLODIPine (NORVASC) 2.5 MG tablet Take 1 tablet (2.5 mg total) by mouth every evening.    apixaban (ELIQUIS) 5 mg Tab Take 1 tablet (5 mg total) by mouth 2 (two) times daily.    atorvastatin (LIPITOR) 10 MG tablet Take 1 tablet (10 mg total) by mouth every evening.    betamethasone dipropionate 0.05 % cream Apply topically 2 (two) times daily.    buPROPion (WELLBUTRIN SR) 150 MG TBSR 12 hr tablet Take 1 tablet (150 mg total) by mouth 2 (two) times daily.    cloNIDine (CATAPRES) 0.1 MG tablet Take 1 tablet (0.1 mg total) by mouth every 8 (eight) hours as needed (if systolic BP more than 185). (Patient not taking: Reported on 2/20/2024)    ergocalciferol (ERGOCALCIFEROL) 50,000 unit Cap Take 50,000 Units by mouth every 7 days.    FLUoxetine 40 MG capsule Take 1 capsule (40 mg total) by mouth once daily.    gabapentin (NEURONTIN) 300 MG capsule Take 1 capsule (300 mg total) by mouth 3 (three) times daily.    HYDROcodone-acetaminophen (NORCO) 5-325 mg per tablet Take 1 tablet by mouth every 6 (six) hours as needed for Pain.    loratadine (CLARITIN) 10 mg tablet Take 10 mg by mouth once daily.    metoprolol succinate (TOPROL-XL) 25 MG 24 hr tablet Take 2 tablets (50 mg total) by mouth once daily. (Patient taking differently: Take 25 mg by mouth 2 (two) times daily.)    mupirocin (BACTROBAN) 2 % ointment Apply topically 2 (two) times daily. (Patient taking differently: Apply 1 g topically 2 (two) times daily.)    traZODone (DESYREL) 50 MG tablet Take 2 tablets (100 mg total) by mouth nightly as needed for Insomnia.     No current facility-administered medications for this visit.     Facility-Administered Medications Ordered in Other Visits   Medication    lidocaine (PF) 10 mg/ml (1%) injection 5 mg       Review of patient's  allergies indicates:  No Known Allergies    Family History   Problem Relation Age of Onset    Alzheimer's disease Mother        Social History     Socioeconomic History    Marital status:    Tobacco Use    Smoking status: Every Day     Current packs/day: 0.25     Average packs/day: 0.5 packs/day for 49.1 years (23.5 ttl pk-yrs)     Types: Cigarettes     Start date: 1975     Passive exposure: Current    Smokeless tobacco: Never    Tobacco comments:     Pt states she is a 46 year smoker, smokes around 5-8 cigarettes per day. Interested in nicotine replacement while admitted. Information given on outpatient smoking cessation.   Substance and Sexual Activity    Alcohol use: Yes     Comment: RARELY    Drug use: Never    Sexual activity: Not Currently     Partners: Male     Social Determinants of Health     Financial Resource Strain: Patient Declined (12/23/2022)    Overall Financial Resource Strain (CARDIA)     Difficulty of Paying Living Expenses: Patient declined   Food Insecurity: Patient Declined (12/23/2022)    Hunger Vital Sign     Worried About Running Out of Food in the Last Year: Patient declined     Ran Out of Food in the Last Year: Patient declined   Transportation Needs: Patient Declined (12/23/2022)    PRAPARE - Transportation     Lack of Transportation (Medical): Patient declined     Lack of Transportation (Non-Medical): Patient declined   Physical Activity: Patient Declined (12/23/2022)    Exercise Vital Sign     Days of Exercise per Week: Patient declined     Minutes of Exercise per Session: Patient declined   Stress: Patient Declined (12/23/2022)    [employer] - Occupational Stress Questionnaire     Feeling of Stress : Patient declined   Social Connections: Patient Declined (12/23/2022)    Social Connection and Isolation Panel [NHANES]     Frequency of Communication with Friends and Family: Patient declined     Frequency of Social Gatherings with Friends and Family:  Patient declined     Attends Faith Services: Patient declined     Active Member of Clubs or Organizations: Patient declined     Attends Club or Organization Meetings: Patient declined     Marital Status: Patient declined   Housing Stability: Unknown (12/23/2022)    Housing Stability Vital Sign     Unable to Pay for Housing in the Last Year: Patient refused     Unstable Housing in the Last Year: Patient refused       Chief Complaint: No chief complaint on file.      Date of surgery:  February 5, 2024    History of present illness:  68-year-old female underwent hematoma evacuation as well as I and D and wound VAC application for necrotic wound from longstanding hematoma.  Continue with the home health wound care.  Patient may start to gently bend her knee.  Okay to shower but no baths.      Review of Systems:    Musculoskeletal:  See HPI        Physical Examination:    Vital Signs:  There were no vitals filed for this visit.    There is no height or weight on file to calculate BMI.    This a well-developed, well nourished patient in no acute distress.  They are alert and oriented and cooperative to examination.  Pt. walks without an antalgic gait.      Examination left knee shows healing incision.  Took out the stitches today.  Patient has some good granulation tissue present.    X-rays:  None     Assessment::  Left hematoma causing left knee wound    Plan:  I reviewed the findings with her and her daughter today.  Took out the stitches.  Continue with the home health wound care and dressing changes.  Patient may continue to use just an Ace wrap since the brace was causing more trouble than helping.  Follow up in 2 weeks for another wound check.    This note was created using [employer] voice recognition software that occasionally misinterpreted phrases or words.

## 2024-02-26 ENCOUNTER — OFFICE VISIT (OUTPATIENT)
Dept: FAMILY MEDICINE | Facility: CLINIC | Age: 69
End: 2024-02-26
Payer: MEDICARE

## 2024-02-26 ENCOUNTER — TELEPHONE (OUTPATIENT)
Dept: FAMILY MEDICINE | Facility: CLINIC | Age: 69
End: 2024-02-26

## 2024-02-26 VITALS
HEART RATE: 84 BPM | SYSTOLIC BLOOD PRESSURE: 132 MMHG | BODY MASS INDEX: 40.57 KG/M2 | DIASTOLIC BLOOD PRESSURE: 88 MMHG | HEIGHT: 63 IN | WEIGHT: 229 LBS

## 2024-02-26 DIAGNOSIS — I35.0 MODERATE AORTIC STENOSIS: ICD-10-CM

## 2024-02-26 DIAGNOSIS — M79.7 FIBROMYALGIA: ICD-10-CM

## 2024-02-26 DIAGNOSIS — E78.2 MIXED HYPERLIPIDEMIA: ICD-10-CM

## 2024-02-26 DIAGNOSIS — S80.02XD TRAUMATIC HEMATOMA OF KNEE, LEFT, SUBSEQUENT ENCOUNTER: ICD-10-CM

## 2024-02-26 DIAGNOSIS — M81.0 AGE-RELATED OSTEOPOROSIS WITHOUT CURRENT PATHOLOGICAL FRACTURE: ICD-10-CM

## 2024-02-26 DIAGNOSIS — D64.9 NORMOCYTIC ANEMIA: ICD-10-CM

## 2024-02-26 DIAGNOSIS — I25.10 CORONARY ARTERY DISEASE INVOLVING NATIVE CORONARY ARTERY OF NATIVE HEART WITHOUT ANGINA PECTORIS: ICD-10-CM

## 2024-02-26 DIAGNOSIS — D62 ACUTE BLOOD LOSS ANEMIA: ICD-10-CM

## 2024-02-26 DIAGNOSIS — I27.20 PULMONARY HYPERTENSION: ICD-10-CM

## 2024-02-26 DIAGNOSIS — M15.9 PRIMARY OSTEOARTHRITIS INVOLVING MULTIPLE JOINTS: ICD-10-CM

## 2024-02-26 DIAGNOSIS — J43.1 PANLOBULAR EMPHYSEMA: ICD-10-CM

## 2024-02-26 DIAGNOSIS — E78.5 DYSLIPIDEMIA: ICD-10-CM

## 2024-02-26 DIAGNOSIS — I48.19 PERSISTENT ATRIAL FIBRILLATION: ICD-10-CM

## 2024-02-26 DIAGNOSIS — Z09 HOSPITAL DISCHARGE FOLLOW-UP: ICD-10-CM

## 2024-02-26 DIAGNOSIS — N18.31 STAGE 3A CHRONIC KIDNEY DISEASE: ICD-10-CM

## 2024-02-26 DIAGNOSIS — E66.01 MORBID OBESITY: ICD-10-CM

## 2024-02-26 DIAGNOSIS — I10 PRIMARY HYPERTENSION: ICD-10-CM

## 2024-02-26 DIAGNOSIS — I35.2 NONRHEUMATIC AORTIC VALVE STENOSIS WITH REGURGITATION: ICD-10-CM

## 2024-02-26 DIAGNOSIS — S01.81XD LACERATION OF FOREHEAD, SUBSEQUENT ENCOUNTER: Primary | ICD-10-CM

## 2024-02-26 DIAGNOSIS — F33.0 MILD EPISODE OF RECURRENT MAJOR DEPRESSIVE DISORDER: ICD-10-CM

## 2024-02-26 DIAGNOSIS — J45.20 MILD INTERMITTENT ASTHMA WITHOUT COMPLICATION: ICD-10-CM

## 2024-02-26 DIAGNOSIS — Z96.651 ARTIFICIAL KNEE JOINT PRESENT, RIGHT: ICD-10-CM

## 2024-02-26 DIAGNOSIS — I42.8 NONISCHEMIC CARDIOMYOPATHY: ICD-10-CM

## 2024-02-26 DIAGNOSIS — D50.0 BLOOD LOSS ANEMIA: ICD-10-CM

## 2024-02-26 PROCEDURE — 99495 TRANSJ CARE MGMT MOD F2F 14D: CPT | Mod: S$GLB,,, | Performed by: FAMILY MEDICINE

## 2024-02-26 RX ORDER — HYDROCODONE BITARTRATE AND ACETAMINOPHEN 5; 325 MG/1; MG/1
1 TABLET ORAL EVERY 6 HOURS PRN
Qty: 40 TABLET | Refills: 0 | Status: CANCELLED | OUTPATIENT
Start: 2024-02-26

## 2024-02-26 RX ORDER — HYDROCODONE BITARTRATE AND ACETAMINOPHEN 5; 325 MG/1; MG/1
1 TABLET ORAL EVERY 6 HOURS PRN
Qty: 40 TABLET | Refills: 0 | Status: SHIPPED | OUTPATIENT
Start: 2024-02-26 | End: 2024-03-25 | Stop reason: SDUPTHER

## 2024-02-26 NOTE — TELEPHONE ENCOUNTER
----- Message from Carolyne Khoury sent at 2/26/2024 10:04 AM CST -----  Contact: daughter giulia  Pt daughter is calling to see if she can reschedule her [facility] appt for this afternoon as she is having trouble finding her transportation   Giulia [phone]

## 2024-02-26 NOTE — TELEPHONE ENCOUNTER
Spoke with Barbara and is asking for patient to come on Wednesday to complete her hospital follow up with Dr. You. Informed that Dr. You does not have an opening that day for a hospital follow up. Patient was discharged on 2/12/24 and today would be 14 days and if needing to reschedule would be a regular appointment. Barbara states she will do her best to bring patient today and will have to take off of work.

## 2024-02-27 LAB
ALBUMIN/CREAT UR: 99 MCG/MG CREAT
APPEARANCE UR: CLEAR
BACTERIA #/AREA URNS HPF: ABNORMAL /HPF
BACTERIA UR CULT: ABNORMAL
BASOPHILS # BLD AUTO: 41 CELLS/UL (ref 0–200)
BASOPHILS NFR BLD AUTO: 0.5 %
BILIRUB UR QL STRIP: NEGATIVE
COLOR UR: YELLOW
CREAT UR-MCNC: 116 MG/DL (ref 20–275)
EOSINOPHIL # BLD AUTO: 267 CELLS/UL (ref 15–500)
EOSINOPHIL NFR BLD AUTO: 3.3 %
ERYTHROCYTE [DISTWIDTH] IN BLOOD BY AUTOMATED COUNT: 16.2 % (ref 11–15)
GLUCOSE UR QL STRIP: NEGATIVE
HCT VFR BLD AUTO: 34.7 % (ref 35–45)
HGB BLD-MCNC: 10.1 G/DL (ref 11.7–15.5)
HGB UR QL STRIP: NEGATIVE
HYALINE CASTS #/AREA URNS LPF: ABNORMAL /LPF
KETONES UR QL STRIP: NEGATIVE
LEUKOCYTE ESTERASE UR QL STRIP: NEGATIVE
LYMPHOCYTES # BLD AUTO: 802 CELLS/UL (ref 850–3900)
LYMPHOCYTES NFR BLD AUTO: 9.9 %
MCH RBC QN AUTO: 26 PG (ref 27–33)
MCHC RBC AUTO-ENTMCNC: 29.1 G/DL (ref 32–36)
MCV RBC AUTO: 89.2 FL (ref 80–100)
MICROALBUMIN UR-MCNC: 11.5 MG/DL
MONOCYTES # BLD AUTO: 535 CELLS/UL (ref 200–950)
MONOCYTES NFR BLD AUTO: 6.6 %
NEUTROPHILS # BLD AUTO: 6456 CELLS/UL (ref 1500–7800)
NEUTROPHILS NFR BLD AUTO: 79.7 %
NITRITE UR QL STRIP: NEGATIVE
PH UR STRIP: 7 [PH] (ref 5–8)
PLATELET # BLD AUTO: 365 THOUSAND/UL (ref 140–400)
PMV BLD REES-ECKER: 9.3 FL (ref 7.5–12.5)
PROT UR QL STRIP: ABNORMAL
RBC # BLD AUTO: 3.89 MILLION/UL (ref 3.8–5.1)
RBC #/AREA URNS HPF: ABNORMAL /HPF
SERVICE CMNT-IMP: ABNORMAL
SP GR UR STRIP: 1.02 (ref 1–1.03)
SQUAMOUS #/AREA URNS HPF: ABNORMAL /HPF
WBC # BLD AUTO: 8.1 THOUSAND/UL (ref 3.8–10.8)
WBC #/AREA URNS HPF: ABNORMAL /HPF

## 2024-02-28 ENCOUNTER — TELEPHONE (OUTPATIENT)
Dept: FAMILY MEDICINE | Facility: CLINIC | Age: 69
End: 2024-02-28
Payer: MEDICARE

## 2024-02-28 NOTE — PROGRESS NOTES
Call patient.  Her anemia is gradually improving, hematocrit has climbed up from 28 all the way to 34.7.  Almost back to normal.  Continue as is

## 2024-02-28 NOTE — TELEPHONE ENCOUNTER
----- Message from Elkin You MD sent at 2/27/2024  7:32 PM CST -----  Call patient.  Her anemia is gradually improving, hematocrit has climbed up from 28 all the way to 34.7.  Almost back to normal.  Continue as is       Call patient.  Urinalysis showed a trace of protein in the urine, no infection seen.   Written by Elkin You MD on 2/27/2024  7:33 PM CST

## 2024-02-28 NOTE — TELEPHONE ENCOUNTER
Roselyn Abarca Staff  Caller: Unspecified (Today, 11:39 AM)  Pt is returning the office call. Pt #[phone]

## 2024-03-01 PROBLEM — S80.02XD: Status: ACTIVE | Noted: 2024-02-04

## 2024-03-02 NOTE — PROGRESS NOTES
SUBJECTIVE:    Patient ID: Iris Mueller is a 68 y.o. female.    Chief Complaint: Hospital Follow Up (No bottles, obstructive sleep, right knee pain, declined colonoscopy (age),abc )    This 68-year-old female had a hospital admission for severe anemia following significant laceration.  She had a forehead laceration and left knee hematoma.  Her fall was on 01/12/2024.  She underwent debridement of the left knee wound with Dr. Huerta, this required a wound VAC.  There was slough on the laceration.  After this was done follow-up blood work as an outpatient showed hemoglobin 6.9 and she was sent back to the hospital for admission for blood  transfusion.    Hospital Course:   69 y/o F hx PAF, aortic stenosis, aortic regurgitaiton, a history of mildly reduced systolic function with recovery - most recent LVEF 50-55%, pulmonary hypertension, nonobstructive coronary artery disease of the LAD and RCA, hypertension, hyperlipidemia, COPD, osteoarthritis, and obesity was admitted with fall injury and left knee hematoma.  On 1/12/24, pt fell and hit floor with her forehead, left knee and left wrist. She went to ER that day and had 15 sutures placed.CT head and C-spine w/o fx, XRays of hip and knee without fx. Pt has had sutures removed as outpatient. She saw her family medicine provder and blood  work done and showed Hgb 6.9 and sent here .  Patient was transfused with a blood and hemoglobin is stable.  Anemia profile consistent with iron-deficiency anemia and IV iron was given.  Later patient was seen by orthopedic surgeon and brought to the OR for the necrotic area of the skin secondary to traumatic hematoma was deprived a wound VAC placed and patient was discharged in stable condition with HH and wound care.  Plan to place her to skilled nursing facility but later patient declined and wanted to go home.  Follow up given with orthopedic and GI as outpatient.    She also has left Achilles lesion that is requiring  treatment.  [facility] ascending wound nurse to her and she is also getting physical therapy.  Ambulates with a Rollator.    Moderate aortic stenosis                Admit Date:  02/03/2024  Discharge Date:  02/12/2024  Discharge Facility: Hospital      Family and/or Caretaker present at visit? No  Medication Reconciliation:  Medications changed/added/deleted.  Hydrocodone 5/325 q.6 hours p.r.n. pain  New Prescriptions filled after discharge: yes  Discharge summary reviewed:  yes  Diagnostic tests reviewed/disposition: No diagnosic tests pending after this hospitalization  Disease/illness education:  Wound care education  Follow up appointments scheduled:  yes              with Orthopedics  Follow up labs/tests ordered:   yes  Home Health ordered on discharge: Patient has home health established at [facility]  Establishment or re-establishment of referral orders for community resources: No other necessary community resources  DME ordered at discharge:   yes  How patient is feeling since discharge from the hospital?  Patient is slowly improving     Discussion with other health care providers: No discussion with other health care providers necessary  Patient follow up phone call documented on separate encounter.    Office Visit on 02/26/2024   Component Date Value Ref Range Status    WBC 02/26/2024 8.1  3.8 - 10.8 Thousand/uL Final    RBC 02/26/2024 3.89  3.80 - 5.10 Million/uL Final    Hemoglobin 02/26/2024 10.1 (L)  11.7 - 15.5 g/dL Final    Hematocrit 02/26/2024 34.7 (L)  35.0 - 45.0 % Final    MCV 02/26/2024 89.2  80.0 - 100.0 fL Final    MCH 02/26/2024 26.0 (L)  27.0 - 33.0 pg Final    MCHC 02/26/2024 29.1 (L)  32.0 - 36.0 g/dL Final    RDW 02/26/2024 16.2 (H)  11.0 - 15.0 % Final    Platelets 02/26/2024 365  140 - 400 Thousand/uL Final    MPV 02/26/2024 9.3  7.5 - 12.5 fL Final    Neutrophils, Abs 02/26/2024 6,456  1,500 - 7,800 cells/uL Final    Lymph #  02/26/2024 802 (L)  850 - 3,900 cells/uL Final    Mono # 02/26/2024 535  200 - 950 cells/uL Final    Eos # 02/26/2024 267  15 - 500 cells/uL Final    Baso # 02/26/2024 41  0 - 200 cells/uL Final    Neutrophils Relative 02/26/2024 79.7  % Final    Lymph % 02/26/2024 9.9  % Final    Mono % 02/26/2024 6.6  % Final    Eosinophil % 02/26/2024 3.3  % Final    Basophil % 02/26/2024 0.5  % Final   No results displayed because visit has over 200 results.      Admission on 01/12/2024, Discharged on 01/12/2024   Component Date Value Ref Range Status    WBC 01/12/2024 8.04  3.90 - 12.70 K/uL Final    RBC 01/12/2024 3.37 (L)  4.00 - 5.40 M/uL Final    Hemoglobin 01/12/2024 9.9 (L)  12.0 - 16.0 g/dL Final    Hematocrit 01/12/2024 30.8 (L)  37.0 - 48.5 % Final    MCV 01/12/2024 91  82 - 98 fL Final    MCH 01/12/2024 29.4  27.0 - 31.0 pg Final    MCHC 01/12/2024 32.1  32.0 - 36.0 g/dL Final    RDW 01/12/2024 15.9 (H)  11.5 - 14.5 % Final    Platelets 01/12/2024 285  150 - 450 K/uL Final    MPV 01/12/2024 9.4  9.2 - 12.9 fL Final    Immature Granulocytes 01/12/2024 0.6 (H)  0.0 - 0.5 % Final    Gran # (ANC) 01/12/2024 6.5  1.8 - 7.7 K/uL Final    Immature Grans (Abs) 01/12/2024 0.05 (H)  0.00 - 0.04 K/uL Final    Lymph # 01/12/2024 0.8 (L)  1.0 - 4.8 K/uL Final    Mono # 01/12/2024 0.5  0.3 - 1.0 K/uL Final    Eos # 01/12/2024 0.2  0.0 - 0.5 K/uL Final    Baso # 01/12/2024 0.05  0.00 - 0.20 K/uL Final    nRBC 01/12/2024 0  0 /100 WBC Final    Gran % 01/12/2024 81.1 (H)  38.0 - 73.0 % Final    Lymph % 01/12/2024 9.7 (L)  18.0 - 48.0 % Final    Mono % 01/12/2024 5.6  4.0 - 15.0 % Final    Eosinophil % 01/12/2024 2.4  0.0 - 8.0 % Final    Basophil % 01/12/2024 0.6  0.0 - 1.9 % Final    Differential Method 01/12/2024 Automated   Final    Sodium 01/12/2024 135 (L)  136 - 145 mmol/L Final    Potassium 01/12/2024 3.5  3.5 - 5.1 mmol/L Final    Chloride 01/12/2024 103  95 - 110 mmol/L Final    CO2 01/12/2024 21 (L)  23 - 29 mmol/L Final     Glucose 01/12/2024 116 (H)  70 - 110 mg/dL Final    BUN 01/12/2024 28 (H)  8 - 23 mg/dL Final    Creatinine 01/12/2024 2.1 (H)  0.5 - 1.4 mg/dL Final    Calcium 01/12/2024 8.4 (L)  8.7 - 10.5 mg/dL Final    Total Protein 01/12/2024 6.0  6.0 - 8.4 g/dL Final    Albumin 01/12/2024 3.1 (L)  3.5 - 5.2 g/dL Final    Total Bilirubin 01/12/2024 0.6  0.1 - 1.0 mg/dL Final    Alkaline Phosphatase 01/12/2024 95  55 - 135 U/L Final    AST 01/12/2024 16  10 - 40 U/L Final    ALT 01/12/2024 15  10 - 44 U/L Final    eGFR 01/12/2024 25 (A)  >60 mL/min/1.73 m^2 Final    Anion Gap 01/12/2024 11  8 - 16 mmol/L Final   Telephone on 01/10/2024   Component Date Value Ref Range Status    Glucose 02/02/2024 87  65 - 99 mg/dL Final    BUN 02/02/2024 20  7 - 25 mg/dL Final    Creatinine 02/02/2024 1.89 (H)  0.60 - 1.00 mg/dL Final    eGFR 02/02/2024 28 (L)  > OR = 60 mL/min/1.73m2 Final    BUN/Creatinine Ratio 02/02/2024 11  6 - 22 (calc) Final    Sodium 02/02/2024 132 (L)  135 - 146 mmol/L Final    Potassium 02/02/2024 4.6  3.5 - 5.3 mmol/L Final    Chloride 02/02/2024 99  98 - 110 mmol/L Final    CO2 02/02/2024 24  20 - 32 mmol/L Final    Calcium 02/02/2024 8.4 (L)  8.6 - 10.4 mg/dL Final    Total Protein 02/02/2024 5.8 (L)  6.1 - 8.1 g/dL Final    Albumin 02/02/2024 3.4 (L)  3.6 - 5.1 g/dL Final    Globulin, Total 02/02/2024 2.4  1.9 - 3.7 g/dL (calc) Final    Albumin/Globulin Ratio 02/02/2024 1.4  1.0 - 2.5 (calc) Final    Total Bilirubin 02/02/2024 0.5  0.2 - 1.2 mg/dL Final    Alkaline Phosphatase 02/02/2024 93  37 - 153 U/L Final    AST 02/02/2024 11  10 - 35 U/L Final    ALT 02/02/2024 8  6 - 29 U/L Final    Cholesterol 02/02/2024 126  <200 mg/dL Final    HDL 02/02/2024 44 (L)  > OR = 50 mg/dL Final    Triglycerides 02/02/2024 74  <150 mg/dL Final    LDL Cholesterol 02/02/2024 67  mg/dL (calc) Final    HDL/Cholesterol Ratio 02/02/2024 2.9  <5.0 (calc) Final    Non HDL Chol. (LDL+VLDL) 02/02/2024 82  <130 mg/dL (calc) Final     Creatinine, Urine 02/26/2024 116  20 - 275 mg/dL Final    Microalb, Ur 02/26/2024 11.5  See Note: mg/dL Final    Microalb/Creat Ratio 02/26/2024 99 (H)  <30 mcg/mg creat Final    TSH w/reflex to FT4 02/02/2024 0.71  0.40 - 4.50 mIU/L Final    WBC 02/02/2024 6.3  3.8 - 10.8 Thousand/uL Final    RBC 02/02/2024 2.57 (L)  3.80 - 5.10 Million/uL Final    Hemoglobin 02/02/2024 6.9 (L)  11.7 - 15.5 g/dL Final    Hematocrit 02/02/2024 23.1 (L)  35.0 - 45.0 % Final    MCV 02/02/2024 89.9  80.0 - 100.0 fL Final    MCH 02/02/2024 26.8 (L)  27.0 - 33.0 pg Final    MCHC 02/02/2024 29.9 (L)  32.0 - 36.0 g/dL Final    RDW 02/02/2024 15.6 (H)  11.0 - 15.0 % Final    Platelets 02/02/2024 394  140 - 400 Thousand/uL Final    MPV 02/02/2024 9.6  7.5 - 12.5 fL Final    Neutrophils, Abs 02/02/2024 4,971  1,500 - 7,800 cells/uL Final    Lymph # 02/02/2024 712 (L)  850 - 3,900 cells/uL Final    Mono # 02/02/2024 403  200 - 950 cells/uL Final    Eos # 02/02/2024 183  15 - 500 cells/uL Final    Baso # 02/02/2024 32  0 - 200 cells/uL Final    Neutrophils Relative 02/02/2024 78.9  % Final    Lymph % 02/02/2024 11.3  % Final    Mono % 02/02/2024 6.4  % Final    Eosinophil % 02/02/2024 2.9  % Final    Basophil % 02/02/2024 0.5  % Final    Differential Comment 02/02/2024 Polychromasia 1 + Anisocytosis 1 +   Final    Color, UA 02/26/2024 YELLOW  YELLOW Final    Appearance, UA 02/26/2024 CLEAR  CLEAR Final    Specific Gravity, UA 02/26/2024 1.017  1.001 - 1.035 Final    pH, UA 02/26/2024 7.0  5.0 - 8.0 Final    Glucose, UA 02/26/2024 NEGATIVE  NEGATIVE Final    Bilirubin, UA 02/26/2024 NEGATIVE  NEGATIVE Final    Ketones, UA 02/26/2024 NEGATIVE  NEGATIVE Final    Occult Blood UA 02/26/2024 NEGATIVE  NEGATIVE Final    Protein, UA 02/26/2024 1+ (A)  NEGATIVE Final    Nitrite, UA 02/26/2024 NEGATIVE  NEGATIVE Final    Leukocytes, UA 02/26/2024 NEGATIVE  NEGATIVE Final    WBC Casts, UA 02/26/2024 NONE SEEN  < OR = 5 /HPF Final    RBC Casts, UA  02/26/2024 NONE SEEN  < OR = 2 /HPF Final    Squam Epithel, UA 02/26/2024 NONE SEEN  < OR = 5 /HPF Final    Bacteria, UA 02/26/2024 NONE SEEN  NONE SEEN /HPF Final    Hyaline Casts, UA 02/26/2024 NONE SEEN  NONE SEEN /LPF Final    Service Cmt: 02/26/2024    Final    Reflexive Urine Culture 02/26/2024    Final    Creatinine, Urine 02/02/2024 65  20 - 275 mg/dL Final    Microalb, Ur 02/02/2024 1.9  See Note: mg/dL Final    Microalb/Creat Ratio 02/02/2024 29  <30 mcg/mg creat Final    Color, UA 02/02/2024 YELLOW  YELLOW Final    Appearance, UA 02/02/2024 CLEAR  CLEAR Final    Specific Gravity, UA 02/02/2024 1.010  1.001 - 1.035 Final    pH, UA 02/02/2024 6.0  5.0 - 8.0 Final    Glucose, UA 02/02/2024 NEGATIVE  NEGATIVE Final    Bilirubin, UA 02/02/2024 NEGATIVE  NEGATIVE Final    Ketones, UA 02/02/2024 NEGATIVE  NEGATIVE Final    Occult Blood UA 02/02/2024 NEGATIVE  NEGATIVE Final    Protein, UA 02/02/2024 NEGATIVE  NEGATIVE Final    Nitrite, UA 02/02/2024 NEGATIVE  NEGATIVE Final    Leukocytes, UA 02/02/2024 NEGATIVE  NEGATIVE Final    WBC Casts, UA 02/02/2024 0-5  < OR = 5 /HPF Final    RBC Casts, UA 02/02/2024 NONE SEEN  < OR = 2 /HPF Final    Squam Epithel, UA 02/02/2024 0-5  < OR = 5 /HPF Final    Bacteria, UA 02/02/2024 NONE SEEN  NONE SEEN /HPF Final    Hyaline Casts, UA 02/02/2024 NONE SEEN  NONE SEEN /LPF Final    Service Cmt: 02/02/2024    Final    Reflexive Urine Culture 02/02/2024    Final   Hospital Outpatient Visit on 08/31/2023   Component Date Value Ref Range Status    BSA 08/31/2023 2.14  m2 Final    Sinus 08/31/2023 3.2  cm Final    STJ 08/31/2023 2.4  cm Final    Ascending aorta 08/31/2023 3.7  cm Final       Past Medical History:   Diagnosis Date    Anemia, unspecified     Arthritis     Asthma     COPD (chronic obstructive pulmonary disease)     Encounter for blood transfusion     Hypertension     Obese body habitus     Wound infection 08/2019    Right knee     Past Surgical History:   Procedure  Laterality Date    ANGIOGRAM, CORONARY, WITH LEFT HEART CATHETERIZATION N/A 2022    Procedure: Angiogram, Coronary, with Left Heart Cath;  Surgeon: Jorge Tran MD;  Location: [facility] CATH/EP LAB;  Service: Cardiology;  Laterality: N/A;     SECTION      ECHOCARDIOGRAM,TRANSESOPHAGEAL N/A 2023    Procedure: Transesophageal echo (MARIANO) intra-procedure log documentation;  Surgeon: Provider, Dosc Diagnostic;  Location: [facility] EP LAB;  Service: Cardiology;  Laterality: N/A;    INCISION AND DRAINAGE OF HEMATOMA Left 2024    Procedure: INCISION AND DRAINAGE, HEMATOMA;  Surgeon: Bryson Huerta MD;  Location: [facility] OR;  Service: Orthopedics;  Laterality: Left;    JOINT REPLACEMENT      Knee    KNEE ARTHROPLASTY Right 2019    Procedure: ARTHROPLASTY, KNEE;  Surgeon: Delio Chung MD;  Location: [facility] OR;  Service: Orthopedics;  Laterality: Right;  anesthesia:  general and block    REPLACEMENT OF WOUND VACUUM-ASSISTED CLOSURE DEVICE Right 2019    Procedure: REPLACEMENT, WOUND VAC;  Surgeon: Delio Chung MD;  Location: [facility] OR;  Service: Orthopedics;  Laterality: Right;    TONSILLECTOMY      TREATMENT OF CARDIAC ARRHYTHMIA N/A 2023    Procedure: Cardioversion or Defibrillation;  Surgeon: PJ Betancourt MD;  Location: [facility] EP LAB;  Service: Cardiology;  Laterality: N/A;  AF, MARIANO, DCCV, MAC, EH, 3 Prep    VENTRICULOGRAM, LEFT  2022    Procedure: Ventriculogram, Left;  Surgeon: Jorge Tran MD;  Location: [facility] CATH/EP LAB;  Service: Cardiology;;     Family History   Problem Relation Age of Onset    Alzheimer's disease Mother        Marital Status:   Alcohol History:  reports current alcohol use.  Tobacco History:  reports that she has been smoking cigarettes. She started smoking about 49 years ago. She has a 23.5 pack-year smoking history. She has been exposed to tobacco smoke. She has never used smokeless tobacco.  Drug History:  reports no history of drug  use.    Review of patient's allergies indicates:  No Known Allergies    Current Outpatient Medications:     amiodarone (PACERONE) 200 MG Tab, Take 1 tablet (200 mg total) by mouth once daily., Disp: 30 tablet, Rfl: 11    amLODIPine (NORVASC) 2.5 MG tablet, Take 1 tablet (2.5 mg total) by mouth every evening., Disp: 90 tablet, Rfl: 3    apixaban (ELIQUIS) 5 mg Tab, Take 1 tablet (5 mg total) by mouth 2 (two) times daily., Disp: 180 tablet, Rfl: 3    atorvastatin (LIPITOR) 10 MG tablet, Take 1 tablet (10 mg total) by mouth every evening., Disp: 90 tablet, Rfl: 3    betamethasone dipropionate 0.05 % cream, Apply topically 2 (two) times daily., Disp: 45 g, Rfl: 2    buPROPion (WELLBUTRIN SR) 150 MG TBSR 12 hr tablet, Take 1 tablet (150 mg total) by mouth 2 (two) times daily., Disp: 180 tablet, Rfl: 3    ergocalciferol (ERGOCALCIFEROL) 50,000 unit Cap, Take 50,000 Units by mouth every 7 days., Disp: , Rfl:     FLUoxetine 40 MG capsule, Take 1 capsule (40 mg total) by mouth once daily., Disp: 90 capsule, Rfl: 3    gabapentin (NEURONTIN) 300 MG capsule, Take 1 capsule (300 mg total) by mouth 3 (three) times daily., Disp: 270 capsule, Rfl: 1    loratadine (CLARITIN) 10 mg tablet, Take 10 mg by mouth once daily., Disp: , Rfl:     traZODone (DESYREL) 50 MG tablet, Take 2 tablets (100 mg total) by mouth nightly as needed for Insomnia., Disp: 180 tablet, Rfl: 1    HYDROcodone-acetaminophen (NORCO) 5-325 mg per tablet, Take 1 tablet by mouth every 6 (six) hours as needed for Pain., Disp: 40 tablet, Rfl: 0  No current facility-administered medications for this visit.    Facility-Administered Medications Ordered in Other Visits:     lidocaine (PF) 10 mg/ml (1%) injection 5 mg, 0.5 mL, Intradermal, Once, Azeem Anderson MD    Review of Systems   Constitutional:  Negative for appetite change, chills, fatigue, fever and unexpected weight change.   HENT:  Negative for ear discharge and ear pain.    Eyes:  Negative for pain,  "discharge and visual disturbance.   Respiratory:  Negative for apnea, cough, shortness of breath and wheezing.    Cardiovascular:  Negative for chest pain, palpitations and leg swelling.   Gastrointestinal:  Negative for abdominal pain, blood in stool, constipation, diarrhea, nausea, vomiting and reflux.   Endocrine: Negative for cold intolerance, heat intolerance and polydipsia.   Genitourinary:  Negative for bladder incontinence, dysuria, hematuria, nocturia and urgency.   Musculoskeletal:  Positive for arthralgias and gait problem. Negative for joint swelling and myalgias.   Integumentary:  Positive for wound.   Neurological:  Negative for dizziness, seizures and numbness.   Psychiatric/Behavioral:  Negative for behavioral problems and hallucinations. The patient is not nervous/anxious.          Objective:      Vitals:    02/26/24 1151 02/26/24 1156   BP: (!) 132/94 132/88   Pulse: 84    Weight: 103.9 kg (229 lb)    Height: 5' 3" (1.6 m)      Physical Exam  Vitals and nursing note reviewed.   Constitutional:       General: She is not in acute distress.     Appearance: She is well-developed. She is obese. She is not toxic-appearing.   HENT:      Head: Normocephalic.        Comments: Forehead laceration is healing well.     Right Ear: External ear normal.      Left Ear: External ear normal.      Nose: Nose normal.   Eyes:      Pupils: Pupils are equal, round, and reactive to light.   Neck:      Thyroid: No thyromegaly.      Vascular: No carotid bruit.   Cardiovascular:      Rate and Rhythm: Normal rate and regular rhythm.      Heart sounds: Murmur (grade 2/6 systolic murmur) heard.   Pulmonary:      Effort: Pulmonary effort is normal.      Breath sounds: Normal breath sounds. No wheezing or rales.   Abdominal:      General: Bowel sounds are normal. There is no distension.      Palpations: Abdomen is soft.      Tenderness: There is no abdominal tenderness.   Musculoskeletal:         General: No tenderness or " deformity. Normal range of motion.      Cervical back: Normal range of motion and neck supple.      Lumbar back: Normal. No spasms.      Comments: Bends 90 degrees at  waist, left knee has a wound that is bandaged., left Achilles also has an small open wound.   Lymphadenopathy:      Cervical: No cervical adenopathy.   Skin:     General: Skin is warm and dry.      Findings: No rash.      Comments: Forehead laceration healing well, hematoma of the left knee status post debridement and continues to get wound care.   Neurological:      Mental Status: She is alert and oriented to person, place, and time.      Cranial Nerves: No cranial nerve deficit.      Motor: Weakness present.      Coordination: Coordination normal.      Gait: Gait abnormal.   Psychiatric:         Behavior: Behavior normal.         Thought Content: Thought content normal.         Judgment: Judgment normal.         Assessment:       1. Laceration of forehead, subsequent encounter    2. Blood loss anemia    3. Mild episode of recurrent major depressive disorder    4. Mild intermittent asthma without complication    5. Panlobular emphysema    6. Pulmonary hypertension    7. Persistent atrial fibrillation    8. Nonrheumatic aortic valve stenosis with regurgitation    9. Nonischemic cardiomyopathy    10. Moderate aortic stenosis    11. Primary hypertension    12. Mixed hyperlipidemia    13. Dyslipidemia    14. Coronary artery disease involving native coronary artery of native heart without angina pectoris    15. Stage 3a chronic kidney disease    16. Normocytic anemia    17. Acute blood loss anemia    18. Morbid obesity    19. Age-related osteoporosis without current pathological fracture    20. Primary osteoarthritis involving multiple joints    21. Artificial knee joint present, right    22. Fibromyalgia    23. Traumatic hematoma of knee, left, subsequent encounter    24. Hospital discharge follow-up         Plan:       Laceration of forehead, subsequent  encounter  -     HYDROcodone-acetaminophen (NORCO) 5-325 mg per tablet; Take 1 tablet by mouth every 6 (six) hours as needed for Pain.  Dispense: 40 tablet; Refill: 0  Refill pain medication  Blood loss anemia  -     CBC Auto Differential; Future; Expected date: 02/26/2024  CBC today to monitor anemia.  Mild episode of recurrent major depressive disorder    Mild intermittent asthma without complication  Continue current medications  Panlobular emphysema    Pulmonary hypertension    Persistent atrial fibrillation  Back on amiodarone and Eliquis.  Nonrheumatic aortic valve stenosis with regurgitation  Grade 2/6 systolic murmur present  Nonischemic cardiomyopathy    Moderate aortic stenosis    Primary hypertension    Mixed hyperlipidemia    Dyslipidemia    Coronary artery disease involving native coronary artery of native heart without angina pectoris    Stage 3a chronic kidney disease    Normocytic anemia    Acute blood loss anemia    Morbid obesity    Age-related osteoporosis without current pathological fracture    Primary osteoarthritis involving multiple joints    Artificial knee joint present, right    Fibromyalgia    Traumatic hematoma of knee, left, subsequent encounter  Status post debridement, continue home health nursing and wound nurse.  Continue dressing changes  Hospital discharge follow-up  Hospital medications reconciled home medications    Follow up in about 2 months (around 4/26/2024), or Roselyn-blood loss anemia, knee wound/hematoma.            Discharge Information     Discharge Date:   2/12/24    Primary Discharge Diagnosis:  Acute Blood loss anemia      Discharge Summary:  Reviewed      Medication & Order Review     Were medication changes made or new medications added?   Yes    If so, has the patient filled the prescriptions?  Yes     Was Home Health ordered? Yes    If so, has Home Health contacted patient and/or initiated services?  Yes    Name of Home Health Agency?  [facility]      Durable Medical Equipment ordered?  Yes     If so, has the DME provider contacted patient and delivered equipment?  N/A    Follow Up               Any problems since discharge? Yes    How is the patient feeling since returning home?  Please see below    Have you set up recommended follow up appointments?  (cardiology, surgery, etc.)    Schedule Hospital Follow-up appointment within 7-14 days (preferably 7).      Notes:  Spoke with patient and states she today is not a good day for her. Using muscles that she has not used before and unable to use her leg. Yes, medication changes and picked up.   Durable medical equipment brace, knee band. Had walker already. [facility] already came out to see patient. Due to follow up with orthopedics and Dr. You.            Elkin You

## 2024-03-11 ENCOUNTER — OFFICE VISIT (OUTPATIENT)
Dept: ORTHOPEDICS | Facility: CLINIC | Age: 69
End: 2024-03-11
Payer: MEDICARE

## 2024-03-11 VITALS — RESPIRATION RATE: 18 BRPM | WEIGHT: 229 LBS | HEIGHT: 63 IN | BODY MASS INDEX: 40.57 KG/M2

## 2024-03-11 DIAGNOSIS — S81.002D OPEN KNEE WOUND, LEFT, SUBSEQUENT ENCOUNTER: Primary | ICD-10-CM

## 2024-03-11 PROCEDURE — 99999 PR PBB SHADOW E&M-EST. PATIENT-LVL III: CPT | Mod: PBBFAC,,, | Performed by: ORTHOPAEDIC SURGERY

## 2024-03-11 PROCEDURE — 99024 POSTOP FOLLOW-UP VISIT: CPT | Mod: S$GLB,,, | Performed by: ORTHOPAEDIC SURGERY

## 2024-03-11 NOTE — PROGRESS NOTES
Past Medical History:   Diagnosis Date    Anemia, unspecified     Arthritis     Asthma     COPD (chronic obstructive pulmonary disease)     Encounter for blood transfusion     Hypertension     Obese body habitus     Wound infection 2019    Right knee       Past Surgical History:   Procedure Laterality Date    ANGIOGRAM, CORONARY, WITH LEFT HEART CATHETERIZATION N/A 2022    Procedure: Angiogram, Coronary, with Left Heart Cath;  Surgeon: Jorge Tran MD;  Location: [facility] CATH/EP LAB;  Service: Cardiology;  Laterality: N/A;     SECTION      ECHOCARDIOGRAM,TRANSESOPHAGEAL N/A 2023    Procedure: Transesophageal echo (MARIANO) intra-procedure log documentation;  Surgeon: Provider, [facility];  Location: [facility] EP LAB;  Service: Cardiology;  Laterality: N/A;    INCISION AND DRAINAGE OF HEMATOMA Left 2024    Procedure: INCISION AND DRAINAGE, HEMATOMA;  Surgeon: Bryson Huerta MD;  Location: [facility] OR;  Service: Orthopedics;  Laterality: Left;    JOINT REPLACEMENT      Knee    KNEE ARTHROPLASTY Right 2019    Procedure: ARTHROPLASTY, KNEE;  Surgeon: Delio Chung MD;  Location: [facility] OR;  Service: Orthopedics;  Laterality: Right;  anesthesia:  general and block    REPLACEMENT OF WOUND VACUUM-ASSISTED CLOSURE DEVICE Right 2019    Procedure: REPLACEMENT, WOUND VAC;  Surgeon: Delio Chung MD;  Location: [facility] OR;  Service: Orthopedics;  Laterality: Right;    TONSILLECTOMY      TREATMENT OF CARDIAC ARRHYTHMIA N/A 2023    Procedure: Cardioversion or Defibrillation;  Surgeon: PJ Betancourt MD;  Location: [facility] EP LAB;  Service: Cardiology;  Laterality: N/A;  AF, MARIANO, DCCV, MAC, EH, 3 Prep    VENTRICULOGRAM, LEFT  2022    Procedure: Ventriculogram, Left;  Surgeon: Jorge Tran MD;  Location: [facility] CATH/EP LAB;  Service: Cardiology;;       Current Outpatient Medications   Medication Sig    amiodarone (PACERONE) 200 MG Tab Take 1 tablet (200 mg total) by mouth once  daily.    amLODIPine (NORVASC) 2.5 MG tablet Take 1 tablet (2.5 mg total) by mouth every evening.    apixaban (ELIQUIS) 5 mg Tab Take 1 tablet (5 mg total) by mouth 2 (two) times daily.    atorvastatin (LIPITOR) 10 MG tablet Take 1 tablet (10 mg total) by mouth every evening.    betamethasone dipropionate 0.05 % cream Apply topically 2 (two) times daily.    buPROPion (WELLBUTRIN SR) 150 MG TBSR 12 hr tablet Take 1 tablet (150 mg total) by mouth 2 (two) times daily.    ergocalciferol (ERGOCALCIFEROL) 50,000 unit Cap Take 50,000 Units by mouth every 7 days.    FLUoxetine 40 MG capsule Take 1 capsule (40 mg total) by mouth once daily.    gabapentin (NEURONTIN) 300 MG capsule Take 1 capsule (300 mg total) by mouth 3 (three) times daily.    HYDROcodone-acetaminophen (NORCO) 5-325 mg per tablet Take 1 tablet by mouth every 6 (six) hours as needed for Pain.    loratadine (CLARITIN) 10 mg tablet Take 10 mg by mouth once daily.    traZODone (DESYREL) 50 MG tablet Take 2 tablets (100 mg total) by mouth nightly as needed for Insomnia.     No current facility-administered medications for this visit.     Facility-Administered Medications Ordered in Other Visits   Medication    lidocaine (PF) 10 mg/ml (1%) injection 5 mg       Review of patient's allergies indicates:  No Known Allergies    Family History   Problem Relation Age of Onset    Alzheimer's disease Mother        Social History     Socioeconomic History    Marital status:    Tobacco Use    Smoking status: Every Day     Current packs/day: 0.25     Average packs/day: 0.5 packs/day for 49.2 years (23.5 ttl pk-yrs)     Types: Cigarettes     Start date: 1975     Passive exposure: Current    Smokeless tobacco: Never    Tobacco comments:     Pt states she is a 46 year smoker, smokes around 5-8 cigarettes per day. Interested in nicotine replacement while admitted. Information given on outpatient smoking cessation.   Substance and Sexual Activity    Alcohol use: Yes      Comment: RARELY    Drug use: Never    Sexual activity: Not Currently     Partners: Male     Social Determinants of Health     Financial Resource Strain: Patient Declined (12/23/2022)    Overall Financial Resource Strain (CARDIA)     Difficulty of Paying Living Expenses: Patient declined   Food Insecurity: Patient Declined (12/23/2022)    Hunger Vital Sign     Worried About Running Out of Food in the Last Year: Patient declined     Ran Out of Food in the Last Year: Patient declined   Transportation Needs: Patient Declined (12/23/2022)    PRAPARE - Transportation     Lack of Transportation (Medical): Patient declined     Lack of Transportation (Non-Medical): Patient declined   Physical Activity: Patient Declined (12/23/2022)    Exercise Vital Sign     Days of Exercise per Week: Patient declined     Minutes of Exercise per Session: Patient declined   Stress: Patient Declined (12/23/2022)    [employer] [place] [employer] - Occupational Stress Questionnaire     Feeling of Stress : Patient declined   Social Connections: Patient Declined (12/23/2022)    Social Connection and Isolation Panel [NHANES]     Frequency of Communication with Friends and Family: Patient declined     Frequency of Social Gatherings with Friends and Family: Patient declined     Attends Hinduism Services: Patient declined     Active Member of Clubs or Organizations: Patient declined     Attends Club or Organization Meetings: Patient declined     Marital Status: Patient declined   Housing Stability: Unknown (12/23/2022)    Housing Stability Vital Sign     Unable to Pay for Housing in the Last Year: Patient refused     Unstable Housing in the Last Year: Patient refused       Chief Complaint: No chief complaint on file.      Date of surgery:  February 5, 2024    History of present illness:  68-year-old female underwent hematoma evacuation as well as I and D and wound VAC application for necrotic wound from longstanding hematoma.  Doing home  wound care twice a week.  The deep wound has closed but still has some superficial area with some granulation tissue that has not fully formed skin over it      Review of Systems:    Musculoskeletal:  See HPI        Physical Examination:    Vital Signs:  There were no vitals filed for this visit.    There is no height or weight on file to calculate BMI.    This a well-developed, well nourished patient in no acute distress.  They are alert and oriented and cooperative to examination.  Pt. walks without an antalgic gait.      Examination left knee shows healing incision.  Patient has some good granulation tissue present.  Small amount of eschar still    X-rays:  None     Assessment::  Left hematoma causing left knee wound    Plan:  I reviewed the findings with her and her daughter today.  We will transition to outpatient wound care.  She might benefit from some role pool or chemical debridement.  Follow up in 3 weeks for wound check.    This note was created using [employer] voice recognition software that occasionally misinterpreted phrases or words.

## 2024-03-12 ENCOUNTER — PATIENT MESSAGE (OUTPATIENT)
Dept: ADMINISTRATIVE | Facility: HOSPITAL | Age: 69
End: 2024-03-12
Payer: MEDICARE

## 2024-03-25 ENCOUNTER — TELEPHONE (OUTPATIENT)
Dept: FAMILY MEDICINE | Facility: CLINIC | Age: 69
End: 2024-03-25
Payer: MEDICARE

## 2024-03-25 DIAGNOSIS — S01.81XD LACERATION OF FOREHEAD, SUBSEQUENT ENCOUNTER: ICD-10-CM

## 2024-03-25 RX ORDER — HYDROCODONE BITARTRATE AND ACETAMINOPHEN 5; 325 MG/1; MG/1
1 TABLET ORAL EVERY 6 HOURS PRN
Qty: 40 TABLET | Refills: 0 | Status: SHIPPED | OUTPATIENT
Start: 2024-03-25 | End: 2024-04-08 | Stop reason: SDUPTHER

## 2024-03-25 NOTE — TELEPHONE ENCOUNTER
Roselyn Abarca Staff  Caller: Unspecified (Today, 11:56 AM)  Refill for hydrocodone. [facility] [address] Pt #[phone]

## 2024-03-26 ENCOUNTER — EXTERNAL HOME HEALTH (OUTPATIENT)
Dept: HOME HEALTH SERVICES | Facility: HOSPITAL | Age: 69
End: 2024-03-26
Payer: MEDICARE

## 2024-04-08 DIAGNOSIS — M17.0 PRIMARY OSTEOARTHRITIS OF BOTH KNEES: ICD-10-CM

## 2024-04-08 DIAGNOSIS — S01.81XD LACERATION OF FOREHEAD, SUBSEQUENT ENCOUNTER: ICD-10-CM

## 2024-04-08 RX ORDER — HYDROCODONE BITARTRATE AND ACETAMINOPHEN 5; 325 MG/1; MG/1
1 TABLET ORAL EVERY 6 HOURS PRN
Qty: 90 TABLET | Refills: 0 | Status: SHIPPED | OUTPATIENT
Start: 2024-04-08 | End: 2024-05-08 | Stop reason: SDUPTHER

## 2024-04-08 RX ORDER — GABAPENTIN 300 MG/1
300 CAPSULE ORAL 3 TIMES DAILY
Qty: 270 CAPSULE | Refills: 3 | Status: SHIPPED | OUTPATIENT
Start: 2024-04-08 | End: 2025-04-08

## 2024-04-08 RX ORDER — HYDROCODONE BITARTRATE AND ACETAMINOPHEN 5; 325 MG/1; MG/1
1 TABLET ORAL EVERY 6 HOURS PRN
Qty: 40 TABLET | Refills: 0 | Status: CANCELLED | OUTPATIENT
Start: 2024-04-08

## 2024-04-08 NOTE — TELEPHONE ENCOUNTER
If she's having pain from fall probably needs to be seen to determine what if anything needs xray    Hydrocodone needs to be refilled by Dr. You and he was previously prescribing hydrocodone TID so should be quantity of #90

## 2024-04-08 NOTE — TELEPHONE ENCOUNTER
----- Message from Chioma Diaz sent at 4/8/2024  3:49 PM CDT -----  Pt needs a refill on gabapentin that needs auth. Norco as well.   [facility] on [address]  [phone]

## 2024-04-08 NOTE — TELEPHONE ENCOUNTER
Spoke with pt in regards to recent messages sent. Verbalized verbatim per Roselyn. Pt acknowledged understanding. Appointment scheduled for 04/15/2024.

## 2024-04-08 NOTE — TELEPHONE ENCOUNTER
Pt is needing a refill on her gabapentin and norco. Last office visit 02/26/2024. Next office visit 04/29/2024. Chart shows last dispense 03/25/2024. Last UDS done on 05/08/2023.        Pt stated that she had a fall x 2 day ago. Stated that now she is having pain on her left side of her body, where she had a recent fall in 01/2024. Pt state that it is hard to  heavy items with her left hand/arm.     Pt stated that the recent prescription on 03/25/2024 was only for a 10 day supply. Okay to fill?

## 2024-04-15 ENCOUNTER — OFFICE VISIT (OUTPATIENT)
Dept: FAMILY MEDICINE | Facility: CLINIC | Age: 69
End: 2024-04-15
Payer: MEDICARE

## 2024-04-15 VITALS
HEIGHT: 63 IN | OXYGEN SATURATION: 97 % | DIASTOLIC BLOOD PRESSURE: 78 MMHG | HEART RATE: 90 BPM | BODY MASS INDEX: 40.93 KG/M2 | SYSTOLIC BLOOD PRESSURE: 130 MMHG | WEIGHT: 231 LBS

## 2024-04-15 DIAGNOSIS — I48.19 PERSISTENT ATRIAL FIBRILLATION: ICD-10-CM

## 2024-04-15 DIAGNOSIS — S80.02XD TRAUMATIC HEMATOMA OF KNEE, LEFT, SUBSEQUENT ENCOUNTER: ICD-10-CM

## 2024-04-15 DIAGNOSIS — T14.8XXA MUSCLE STRAIN: Primary | ICD-10-CM

## 2024-04-15 PROCEDURE — 1125F AMNT PAIN NOTED PAIN PRSNT: CPT | Mod: CPTII,S$GLB,, | Performed by: NURSE PRACTITIONER

## 2024-04-15 PROCEDURE — 3066F NEPHROPATHY DOC TX: CPT | Mod: CPTII,S$GLB,, | Performed by: NURSE PRACTITIONER

## 2024-04-15 PROCEDURE — 99213 OFFICE O/P EST LOW 20 MIN: CPT | Mod: S$GLB,,, | Performed by: NURSE PRACTITIONER

## 2024-04-15 PROCEDURE — 3075F SYST BP GE 130 - 139MM HG: CPT | Mod: CPTII,S$GLB,, | Performed by: NURSE PRACTITIONER

## 2024-04-15 PROCEDURE — 3078F DIAST BP <80 MM HG: CPT | Mod: CPTII,S$GLB,, | Performed by: NURSE PRACTITIONER

## 2024-04-15 PROCEDURE — 1159F MED LIST DOCD IN RCRD: CPT | Mod: CPTII,S$GLB,, | Performed by: NURSE PRACTITIONER

## 2024-04-15 PROCEDURE — 1101F PT FALLS ASSESS-DOCD LE1/YR: CPT | Mod: CPTII,S$GLB,, | Performed by: NURSE PRACTITIONER

## 2024-04-15 PROCEDURE — 1160F RVW MEDS BY RX/DR IN RCRD: CPT | Mod: CPTII,S$GLB,, | Performed by: NURSE PRACTITIONER

## 2024-04-15 PROCEDURE — 3008F BODY MASS INDEX DOCD: CPT | Mod: CPTII,S$GLB,, | Performed by: NURSE PRACTITIONER

## 2024-04-15 PROCEDURE — 3060F POS MICROALBUMINURIA REV: CPT | Mod: CPTII,S$GLB,, | Performed by: NURSE PRACTITIONER

## 2024-04-15 PROCEDURE — 3288F FALL RISK ASSESSMENT DOCD: CPT | Mod: CPTII,S$GLB,, | Performed by: NURSE PRACTITIONER

## 2024-04-15 RX ORDER — METHOCARBAMOL 750 MG/1
750 TABLET, FILM COATED ORAL NIGHTLY PRN
Qty: 20 TABLET | Refills: 1 | Status: SHIPPED | OUTPATIENT
Start: 2024-04-15

## 2024-04-15 NOTE — PROGRESS NOTES
Patient ID: Iris Mueller is a 68 y.o. female.    Chief Complaint: Fall (No bottles//Pt is here to discuss fall that  happen in 2024. Pt states that she is still having pain on the left side of her body. Pt stated that she almost had another fall last week//JAIME )    Pt here for sick visit- reports a few days ago was standing in tub and went to step out of tub- was holding on to grabbar with left arm while standing on left leg and couldn't lift her right leg out of the tub- reports almost fell but was able to hold on to grabbar putting all her weight on left arm and slowly inch over to the tub bench. Reports since then has been having pain left upper arm/shoulder and neck as well as chronic left knee pain. Admits pain has improved a little over the past few days. Has been taking hydrocodone every 6-8 hrs which does help with pain. Still doesn't feel like she's totally recovered from her bad fall in January when she suffered forehead laceration and bad hematoma to left knee which eventually got infected and required surgical I&D    Still has HH coming out for wound care to left knee- no longer has wound vac, reports wound is slowly healing. Has f/u with Dr. Marie            Past Medical History:   Diagnosis Date    Anemia, unspecified     Arthritis     Asthma     COPD (chronic obstructive pulmonary disease)     Encounter for blood transfusion     Hypertension     Obese body habitus     Wound infection 2019    Right knee     Past Surgical History:   Procedure Laterality Date    ANGIOGRAM, CORONARY, WITH LEFT HEART CATHETERIZATION N/A 2022    Procedure: Angiogram, Coronary, with Left Heart Cath;  Surgeon: Jroge Tran MD;  Location: [facility] CATH/EP LAB;  Service: Cardiology;  Laterality: N/A;     SECTION      ECHOCARDIOGRAM,TRANSESOPHAGEAL N/A 2023    Procedure: Transesophageal echo (MARIANO) intra-procedure log documentation;  Surgeon: Provider, [facility];  Location: [facility] EP LAB;   Service: Cardiology;  Laterality: N/A;    INCISION AND DRAINAGE OF HEMATOMA Left 2/5/2024    Procedure: INCISION AND DRAINAGE, HEMATOMA;  Surgeon: Bryson Huerta MD;  Location: [facility] OR;  Service: Orthopedics;  Laterality: Left;    JOINT REPLACEMENT      Knee    KNEE ARTHROPLASTY Right 8/14/2019    Procedure: ARTHROPLASTY, KNEE;  Surgeon: Delio Chung MD;  [facility];  Service: Orthopedics;  Laterality: Right;  anesthesia:  general and block    REPLACEMENT OF WOUND VACUUM-ASSISTED CLOSURE DEVICE Right 9/12/2019    Procedure: REPLACEMENT, WOUND VAC;  Surgeon: Delio Chung MD;  Location: [facility] OR;  Service: Orthopedics;  Laterality: Right;    TONSILLECTOMY      TREATMENT OF CARDIAC ARRHYTHMIA N/A 8/31/2023    Procedure: Cardioversion or Defibrillation;  Surgeon: PJ Betancourt MD;  Location: [facility] EP LAB;  Service: Cardiology;  Laterality: N/A;  AF, MARIANO, DCCV, MAC, EH, 3 Prep    VENTRICULOGRAM, LEFT  12/23/2022    Procedure: Ventriculogram, Left;  Surgeon: Jorge Tran MD;  Location: [facility] CATH/EP LAB;  Service: Cardiology;;       Tests to Keep You Healthy    Mammogram: Met on 12/5/2023  Colon Cancer Screening: ORDERED  Last Blood Pressure <= 139/89 (4/15/2024): Yes  Tobacco Cessation: NO        Tobacco History:  reports that she has been smoking cigarettes. She started smoking about 49 years ago. She has a 23.6 pack-year smoking history. She has been exposed to tobacco smoke. She has never used smokeless tobacco.      Review of patient's allergies indicates:  No Known Allergies    Current Outpatient Medications:     amiodarone (PACERONE) 200 MG Tab, Take 1 tablet (200 mg total) by mouth once daily., Disp: 30 tablet, Rfl: 11    amLODIPine (NORVASC) 2.5 MG tablet, Take 1 tablet (2.5 mg total) by mouth every evening., Disp: 90 tablet, Rfl: 3    apixaban (ELIQUIS) 5 mg Tab, Take 1 tablet (5 mg total) by mouth 2 (two) times daily., Disp: 180 tablet, Rfl: 3    atorvastatin (LIPITOR) 10 MG  tablet, Take 1 tablet (10 mg total) by mouth every evening., Disp: 90 tablet, Rfl: 3    betamethasone dipropionate 0.05 % cream, Apply topically 2 (two) times daily., Disp: 45 g, Rfl: 2    buPROPion (WELLBUTRIN SR) 150 MG TBSR 12 hr tablet, Take 1 tablet (150 mg total) by mouth 2 (two) times daily., Disp: 180 tablet, Rfl: 3    ergocalciferol (ERGOCALCIFEROL) 50,000 unit Cap, Take 50,000 Units by mouth every 7 days., Disp: , Rfl:     FLUoxetine 40 MG capsule, Take 1 capsule (40 mg total) by mouth once daily., Disp: 90 capsule, Rfl: 3    gabapentin (NEURONTIN) 300 MG capsule, Take 1 capsule (300 mg total) by mouth 3 (three) times daily., Disp: 270 capsule, Rfl: 3    HYDROcodone-acetaminophen (NORCO) 5-325 mg per tablet, Take 1 tablet by mouth every 6 (six) hours as needed for Pain., Disp: 90 tablet, Rfl: 0    loratadine (CLARITIN) 10 mg tablet, Take 10 mg by mouth once daily., Disp: , Rfl:     methocarbamoL (ROBAXIN) 750 MG Tab, Take 1 tablet (750 mg total) by mouth nightly as needed (muscle pain/spasms)., Disp: 20 tablet, Rfl: 1    metoprolol succinate (TOPROL-XL) 25 MG 24 hr tablet, Take 1 tablet (25 mg total) by mouth once daily., Disp: 90 tablet, Rfl: 3    traZODone (DESYREL) 50 MG tablet, Take 2 tablets (100 mg total) by mouth nightly as needed for Insomnia., Disp: 180 tablet, Rfl: 1  No current facility-administered medications for this visit.    Facility-Administered Medications Ordered in Other Visits:     lidocaine (PF) 10 mg/ml (1%) injection 5 mg, 0.5 mL, Intradermal, Once, Azeem Anderson MD    Review of Systems   Constitutional:  Negative for appetite change, chills and fever.   Eyes:  Negative for visual disturbance.   Respiratory:  Positive for shortness of breath (only with heavy exertion). Negative for cough and wheezing.    Cardiovascular:  Negative for chest pain, palpitations and leg swelling.   Genitourinary:  Positive for frequency. Negative for dysuria and hematuria.   Musculoskeletal:   "Positive for arthralgias (left shoulder/upper arm pain, left knee pain), gait problem (feels unsteady initially upon rising) and joint swelling (left knee swelling).   Skin:  Positive for wound (left knee wound slowly healing). Negative for color change.   Neurological:  Positive for headaches (reports has shooting pains to head since she fell and suffered forehead lac in January). Negative for dizziness, syncope and speech difficulty.   Hematological:  Bruises/bleeds easily.          Objective:      Vitals:    04/15/24 1558   BP: 130/78   Pulse: 90   SpO2: 97%   Weight: 104.8 kg (231 lb)   Height: 5' 3" (1.6 m)     Physical Exam  Vitals and nursing note reviewed.   Constitutional:       General: She is not in acute distress.     Appearance: She is well-developed.      Comments: Morbidly obese white female, appears older than stated age   HENT:      Head: Normocephalic.     Neck:      Vascular: No carotid bruit.     Cardiovascular:      Rate and Rhythm: Normal rate. Rhythm irregular.      Heart sounds: Murmur (2nd ICS RSB) heard.      Systolic murmur is present with a grade of 3/6.      No friction rub. No gallop.   Pulmonary:      Effort: Pulmonary effort is normal. No respiratory distress.      Breath sounds: Normal breath sounds. No wheezing or rales.   Abdominal:      General: There is no distension.      Palpations: Abdomen is soft.      Tenderness: There is no abdominal tenderness.   Musculoskeletal:      Left shoulder: Tenderness and bony tenderness present. No swelling or deformity. Decreased range of motion (active abduction to 100 degrees). Normal strength.      Left upper arm: Tenderness (slightly tender to touch left bicep area) present. No swelling, edema or deformity.      Cervical back: Neck supple. No edema or erythema. Muscular tenderness present. No spinous process tenderness.      Left knee: No erythema. Normal range of motion. No tenderness.      Right lower leg: No edema.      Left lower leg: No " edema.      Comments: Foam dressing to left knee dry/intact   Lymphadenopathy:      Cervical: No cervical adenopathy.   Skin:     General: Skin is warm and dry.      Findings: No rash.   Neurological:      General: No focal deficit present.      Mental Status: She is alert and oriented to person, place, and time.      Gait: Gait abnormal (antalgic gait).           Assessment:       1. Muscle strain    2. Traumatic hematoma of knee, left, subsequent encounter    3. Persistent atrial fibrillation           Plan:       Muscle strain  -pt advised likely muscle strain to neck and shoulder/upper arm from hanging onto grab bar. Has good shoulder ROM so low suspicion for acute rotator cuff tear. Recommend heat and stretching, will add robaxin QHS prn to help with pain though cautioned on side effects of muscle relaxers including increased risk of fall. Advised to not take muscle relaxer and pain medication within several hours of each other  -     methocarbamoL (ROBAXIN) 750 MG Tab; Take 1 tablet (750 mg total) by mouth nightly as needed (muscle pain/spasms).  Dispense: 20 tablet; Refill: 1    Traumatic hematoma of knee, left, subsequent encounter   -improving with  wound care    Persistent atrial fibrillation\   -rate stable    Follow up in about 6 weeks (around 5/27/2024) for cancel 4/29 appt.        4/15/2024 Roselyn Cote NP

## 2024-05-08 DIAGNOSIS — S01.81XD LACERATION OF FOREHEAD, SUBSEQUENT ENCOUNTER: ICD-10-CM

## 2024-05-08 RX ORDER — HYDROCODONE BITARTRATE AND ACETAMINOPHEN 5; 325 MG/1; MG/1
1 TABLET ORAL EVERY 6 HOURS PRN
Qty: 90 TABLET | Refills: 0 | Status: SHIPPED | OUTPATIENT
Start: 2024-05-08 | End: 2024-06-10 | Stop reason: SDUPTHER

## 2024-05-08 NOTE — TELEPHONE ENCOUNTER
----- Message from Chioma Diaz sent at 5/8/2024 12:40 PM CDT -----  Needs refill on pain medication. Pt states she thinks she is correct on the due date.   Ana Waller [facility]   [phone]

## 2024-05-16 DIAGNOSIS — F33.0 MILD EPISODE OF RECURRENT MAJOR DEPRESSIVE DISORDER: ICD-10-CM

## 2024-05-16 RX ORDER — FLUOXETINE HYDROCHLORIDE 40 MG/1
40 CAPSULE ORAL DAILY
Qty: 90 CAPSULE | Refills: 3 | Status: SHIPPED | OUTPATIENT
Start: 2024-05-16

## 2024-05-16 NOTE — TELEPHONE ENCOUNTER
----- Message from Chioma Diaz sent at 5/16/2024  9:52 AM CDT -----  Pt needs a refill on duloxetine   [facility]   [phone]

## 2024-05-20 DIAGNOSIS — E78.5 DYSLIPIDEMIA: ICD-10-CM

## 2024-05-20 RX ORDER — ATORVASTATIN CALCIUM 10 MG/1
10 TABLET, FILM COATED ORAL NIGHTLY
Qty: 90 TABLET | Refills: 3 | Status: SHIPPED | OUTPATIENT
Start: 2024-05-20

## 2024-05-20 NOTE — TELEPHONE ENCOUNTER
----- Message from Amaya Porter sent at 5/20/2024 12:18 PM CDT -----  Refill Atorvastatin Can this be called in today. She is going out of town. She had a death in her family. [facility] on [address] [facility] [address] [facility] pt's # [phone] GH

## 2024-05-28 DIAGNOSIS — Z00.00 ENCOUNTER FOR MEDICARE ANNUAL WELLNESS EXAM: ICD-10-CM

## 2024-06-05 ENCOUNTER — DOCUMENT SCAN (OUTPATIENT)
Dept: HOME HEALTH SERVICES | Facility: HOSPITAL | Age: 69
End: 2024-06-05
Payer: MEDICARE

## 2024-06-06 ENCOUNTER — DOCUMENT SCAN (OUTPATIENT)
Dept: HOME HEALTH SERVICES | Facility: HOSPITAL | Age: 69
End: 2024-06-06
Payer: MEDICARE

## 2024-06-10 ENCOUNTER — OFFICE VISIT (OUTPATIENT)
Dept: FAMILY MEDICINE | Facility: CLINIC | Age: 69
End: 2024-06-10
Payer: MEDICARE

## 2024-06-10 VITALS
BODY MASS INDEX: 38.98 KG/M2 | HEART RATE: 76 BPM | WEIGHT: 220 LBS | DIASTOLIC BLOOD PRESSURE: 74 MMHG | SYSTOLIC BLOOD PRESSURE: 120 MMHG | HEIGHT: 63 IN | OXYGEN SATURATION: 98 %

## 2024-06-10 DIAGNOSIS — F33.0 MILD EPISODE OF RECURRENT MAJOR DEPRESSIVE DISORDER: ICD-10-CM

## 2024-06-10 DIAGNOSIS — M17.0 PRIMARY OSTEOARTHRITIS OF BOTH KNEES: ICD-10-CM

## 2024-06-10 DIAGNOSIS — I48.19 PERSISTENT ATRIAL FIBRILLATION: Primary | ICD-10-CM

## 2024-06-10 DIAGNOSIS — J43.1 PANLOBULAR EMPHYSEMA: ICD-10-CM

## 2024-06-10 DIAGNOSIS — I50.20 HFREF (HEART FAILURE WITH REDUCED EJECTION FRACTION): ICD-10-CM

## 2024-06-10 DIAGNOSIS — I70.0 AORTIC ATHEROSCLEROSIS: ICD-10-CM

## 2024-06-10 DIAGNOSIS — N18.32 STAGE 3B CHRONIC KIDNEY DISEASE: ICD-10-CM

## 2024-06-10 DIAGNOSIS — S01.81XD LACERATION OF FOREHEAD, SUBSEQUENT ENCOUNTER: ICD-10-CM

## 2024-06-10 DIAGNOSIS — I35.0 MODERATE AORTIC STENOSIS: ICD-10-CM

## 2024-06-10 PROCEDURE — 3008F BODY MASS INDEX DOCD: CPT | Mod: CPTII,S$GLB,, | Performed by: NURSE PRACTITIONER

## 2024-06-10 PROCEDURE — 3078F DIAST BP <80 MM HG: CPT | Mod: CPTII,S$GLB,, | Performed by: NURSE PRACTITIONER

## 2024-06-10 PROCEDURE — 3074F SYST BP LT 130 MM HG: CPT | Mod: CPTII,S$GLB,, | Performed by: NURSE PRACTITIONER

## 2024-06-10 PROCEDURE — 99214 OFFICE O/P EST MOD 30 MIN: CPT | Mod: S$GLB,,, | Performed by: NURSE PRACTITIONER

## 2024-06-10 PROCEDURE — 1125F AMNT PAIN NOTED PAIN PRSNT: CPT | Mod: CPTII,S$GLB,, | Performed by: NURSE PRACTITIONER

## 2024-06-10 PROCEDURE — 3066F NEPHROPATHY DOC TX: CPT | Mod: CPTII,S$GLB,, | Performed by: NURSE PRACTITIONER

## 2024-06-10 PROCEDURE — 3288F FALL RISK ASSESSMENT DOCD: CPT | Mod: CPTII,S$GLB,, | Performed by: NURSE PRACTITIONER

## 2024-06-10 PROCEDURE — 1159F MED LIST DOCD IN RCRD: CPT | Mod: CPTII,S$GLB,, | Performed by: NURSE PRACTITIONER

## 2024-06-10 PROCEDURE — 1160F RVW MEDS BY RX/DR IN RCRD: CPT | Mod: CPTII,S$GLB,, | Performed by: NURSE PRACTITIONER

## 2024-06-10 PROCEDURE — 1101F PT FALLS ASSESS-DOCD LE1/YR: CPT | Mod: CPTII,S$GLB,, | Performed by: NURSE PRACTITIONER

## 2024-06-10 PROCEDURE — 3060F POS MICROALBUMINURIA REV: CPT | Mod: CPTII,S$GLB,, | Performed by: NURSE PRACTITIONER

## 2024-06-10 RX ORDER — BUPROPION HYDROCHLORIDE 150 MG/1
150 TABLET, EXTENDED RELEASE ORAL 2 TIMES DAILY
Qty: 180 TABLET | Refills: 3 | Status: SHIPPED | OUTPATIENT
Start: 2024-06-10 | End: 2025-06-10

## 2024-06-10 RX ORDER — HYDROCODONE BITARTRATE AND ACETAMINOPHEN 5; 325 MG/1; MG/1
1 TABLET ORAL EVERY 6 HOURS PRN
Qty: 90 TABLET | Refills: 0 | Status: SHIPPED | OUTPATIENT
Start: 2024-06-10

## 2024-06-10 RX ORDER — METOPROLOL SUCCINATE 25 MG/1
25 TABLET, EXTENDED RELEASE ORAL DAILY
Qty: 90 TABLET | Refills: 3 | Status: SHIPPED | OUTPATIENT
Start: 2024-06-10 | End: 2025-06-10

## 2024-06-10 RX ORDER — BETAMETHASONE DIPROPIONATE 0.5 MG/G
CREAM TOPICAL 2 TIMES DAILY
Qty: 45 G | Refills: 2 | Status: SHIPPED | OUTPATIENT
Start: 2024-06-10

## 2024-06-10 NOTE — PATIENT INSTRUCTIONS
Recommend shingles and RSV vaccine at the pharmacy    Collect cologuard stool test and mail it back

## 2024-06-10 NOTE — TELEPHONE ENCOUNTER
The patient's prescription has been approved and [facility]   [id number] - [place], [place] - [address] [facility] [address]  [address] [id number]  Phone: [phone] Fax: [phone]

## 2024-06-10 NOTE — TELEPHONE ENCOUNTER
Pt is needing a refill on her [place]. Pt is here today for a office visit. Chart shows last dispense 05/08/2024. Last UDS done on 05/08/2023.

## 2024-06-10 NOTE — PROGRESS NOTES
SUBJECTIVE:    Patient ID: Iris Mueller is a 68 y.o. female.    Chief Complaint: Follow-up (No bottles//pt is here for a check up and medication refills, trazodone and betamethasone cream//Pt has the Cologuard kit at home//JAIME )    Pt here for regular f/u- HTN, AS, lipids, OA    Pt reports overall doing okay since last visit. No further falls. Left knee wound has completely healed- still has a lot of pain to knee however. Left shoulder/neck pain continues if she lifts something with her left hand. Reports ambulating with rollator at home, uses WC if she has to walk longer distances.    Still has HH coming out    Follows with Dr. Tran for CHF, AS, Afib- reports she cut eliquis back to only once a day because she seemed to be bruising too much. Denies any nose bleeds, hematuria, melena. Doesn't have f/u with cards scheduled    Still smoking but only 2-5 cigs/day            Office Visit on 02/26/2024   Component Date Value Ref Range Status    WBC 02/26/2024 8.1  3.8 - 10.8 Thousand/uL Final    RBC 02/26/2024 3.89  3.80 - 5.10 Million/uL Final    Hemoglobin 02/26/2024 10.1 (L)  11.7 - 15.5 g/dL Final    Hematocrit 02/26/2024 34.7 (L)  35.0 - 45.0 % Final    MCV 02/26/2024 89.2  80.0 - 100.0 fL Final    MCH 02/26/2024 26.0 (L)  27.0 - 33.0 pg Final    MCHC 02/26/2024 29.1 (L)  32.0 - 36.0 g/dL Final    RDW 02/26/2024 16.2 (H)  11.0 - 15.0 % Final    Platelets 02/26/2024 365  140 - 400 Thousand/uL Final    MPV 02/26/2024 9.3  7.5 - 12.5 fL Final    Neutrophils, Abs 02/26/2024 6,456  1,500 - 7,800 cells/uL Final    Lymph # 02/26/2024 802 (L)  850 - 3,900 cells/uL Final    Mono # 02/26/2024 535  200 - 950 cells/uL Final    Eos # 02/26/2024 267  15 - 500 cells/uL Final    Baso # 02/26/2024 41  0 - 200 cells/uL Final    Neutrophils Relative 02/26/2024 79.7  % Final    Lymph % 02/26/2024 9.9  % Final    Mono % 02/26/2024 6.6  % Final    Eosinophil % 02/26/2024 3.3  % Final    Basophil % 02/26/2024 0.5  % Final    No results displayed because visit has over 200 results.      Admission on 01/12/2024, Discharged on 01/12/2024   Component Date Value Ref Range Status    WBC 01/12/2024 8.04  3.90 - 12.70 K/uL Final    RBC 01/12/2024 3.37 (L)  4.00 - 5.40 M/uL Final    Hemoglobin 01/12/2024 9.9 (L)  12.0 - 16.0 g/dL Final    Hematocrit 01/12/2024 30.8 (L)  37.0 - 48.5 % Final    MCV 01/12/2024 91  82 - 98 fL Final    MCH 01/12/2024 29.4  27.0 - 31.0 pg Final    MCHC 01/12/2024 32.1  32.0 - 36.0 g/dL Final    RDW 01/12/2024 15.9 (H)  11.5 - 14.5 % Final    Platelets 01/12/2024 285  150 - 450 K/uL Final    MPV 01/12/2024 9.4  9.2 - 12.9 fL Final    Immature Granulocytes 01/12/2024 0.6 (H)  0.0 - 0.5 % Final    Gran # (ANC) 01/12/2024 6.5  1.8 - 7.7 K/uL Final    Immature Grans (Abs) 01/12/2024 0.05 (H)  0.00 - 0.04 K/uL Final    Lymph # 01/12/2024 0.8 (L)  1.0 - 4.8 K/uL Final    Mono # 01/12/2024 0.5  0.3 - 1.0 K/uL Final    Eos # 01/12/2024 0.2  0.0 - 0.5 K/uL Final    Baso # 01/12/2024 0.05  0.00 - 0.20 K/uL Final    nRBC 01/12/2024 0  0 /100 WBC Final    Gran % 01/12/2024 81.1 (H)  38.0 - 73.0 % Final    Lymph % 01/12/2024 9.7 (L)  18.0 - 48.0 % Final    Mono % 01/12/2024 5.6  4.0 - 15.0 % Final    Eosinophil % 01/12/2024 2.4  0.0 - 8.0 % Final    Basophil % 01/12/2024 0.6  0.0 - 1.9 % Final    Differential Method 01/12/2024 Automated   Final    Sodium 01/12/2024 135 (L)  136 - 145 mmol/L Final    Potassium 01/12/2024 3.5  3.5 - 5.1 mmol/L Final    Chloride 01/12/2024 103  95 - 110 mmol/L Final    CO2 01/12/2024 21 (L)  23 - 29 mmol/L Final    Glucose 01/12/2024 116 (H)  70 - 110 mg/dL Final    BUN 01/12/2024 28 (H)  8 - 23 mg/dL Final    Creatinine 01/12/2024 2.1 (H)  0.5 - 1.4 mg/dL Final    Calcium 01/12/2024 8.4 (L)  8.7 - 10.5 mg/dL Final    Total Protein 01/12/2024 6.0  6.0 - 8.4 g/dL Final    Albumin 01/12/2024 3.1 (L)  3.5 - 5.2 g/dL Final    Total Bilirubin 01/12/2024 0.6  0.1 - 1.0 mg/dL Final    Alkaline Phosphatase  01/12/2024 95  55 - 135 U/L Final    AST 01/12/2024 16  10 - 40 U/L Final    ALT 01/12/2024 15  10 - 44 U/L Final    eGFR 01/12/2024 25 (A)  >60 mL/min/1.73 m^2 Final    Anion Gap 01/12/2024 11  8 - 16 mmol/L Final   Telephone on 01/10/2024   Component Date Value Ref Range Status    Glucose 02/02/2024 87  65 - 99 mg/dL Final    BUN 02/02/2024 20  7 - 25 mg/dL Final    Creatinine 02/02/2024 1.89 (H)  0.60 - 1.00 mg/dL Final    eGFR 02/02/2024 28 (L)  > OR = 60 mL/min/1.73m2 Final    BUN/Creatinine Ratio 02/02/2024 11  6 - 22 (calc) Final    Sodium 02/02/2024 132 (L)  135 - 146 mmol/L Final    Potassium 02/02/2024 4.6  3.5 - 5.3 mmol/L Final    Chloride 02/02/2024 99  98 - 110 mmol/L Final    CO2 02/02/2024 24  20 - 32 mmol/L Final    Calcium 02/02/2024 8.4 (L)  8.6 - 10.4 mg/dL Final    Total Protein 02/02/2024 5.8 (L)  6.1 - 8.1 g/dL Final    Albumin 02/02/2024 3.4 (L)  3.6 - 5.1 g/dL Final    Globulin, Total 02/02/2024 2.4  1.9 - 3.7 g/dL (calc) Final    Albumin/Globulin Ratio 02/02/2024 1.4  1.0 - 2.5 (calc) Final    Total Bilirubin 02/02/2024 0.5  0.2 - 1.2 mg/dL Final    Alkaline Phosphatase 02/02/2024 93  37 - 153 U/L Final    AST 02/02/2024 11  10 - 35 U/L Final    ALT 02/02/2024 8  6 - 29 U/L Final    Cholesterol 02/02/2024 126  <200 mg/dL Final    HDL 02/02/2024 44 (L)  > OR = 50 mg/dL Final    Triglycerides 02/02/2024 74  <150 mg/dL Final    LDL Cholesterol 02/02/2024 67  mg/dL (calc) Final    HDL/Cholesterol Ratio 02/02/2024 2.9  <5.0 (calc) Final    Non HDL Chol. (LDL+VLDL) 02/02/2024 82  <130 mg/dL (calc) Final    Creatinine, Urine 02/26/2024 116  20 - 275 mg/dL Final    Microalb, Ur 02/26/2024 11.5  See Note: mg/dL Final    Microalb/Creat Ratio 02/26/2024 99 (H)  <30 mcg/mg creat Final    TSH w/reflex to FT4 02/02/2024 0.71  0.40 - 4.50 mIU/L Final    WBC 02/02/2024 6.3  3.8 - 10.8 Thousand/uL Final    RBC 02/02/2024 2.57 (L)  3.80 - 5.10 Million/uL Final    Hemoglobin 02/02/2024 6.9 (L)  11.7 - 15.5  g/dL Final    Hematocrit 02/02/2024 23.1 (L)  35.0 - 45.0 % Final    MCV 02/02/2024 89.9  80.0 - 100.0 fL Final    MCH 02/02/2024 26.8 (L)  27.0 - 33.0 pg Final    MCHC 02/02/2024 29.9 (L)  32.0 - 36.0 g/dL Final    RDW 02/02/2024 15.6 (H)  11.0 - 15.0 % Final    Platelets 02/02/2024 394  140 - 400 Thousand/uL Final    MPV 02/02/2024 9.6  7.5 - 12.5 fL Final    Neutrophils, Abs 02/02/2024 4,971  1,500 - 7,800 cells/uL Final    Lymph # 02/02/2024 712 (L)  850 - 3,900 cells/uL Final    Mono # 02/02/2024 403  200 - 950 cells/uL Final    Eos # 02/02/2024 183  15 - 500 cells/uL Final    Baso # 02/02/2024 32  0 - 200 cells/uL Final    Neutrophils Relative 02/02/2024 78.9  % Final    Lymph % 02/02/2024 11.3  % Final    Mono % 02/02/2024 6.4  % Final    Eosinophil % 02/02/2024 2.9  % Final    Basophil % 02/02/2024 0.5  % Final    Differential Comment 02/02/2024 Polychromasia 1 + Anisocytosis 1 +   Final    Color, UA 02/26/2024 YELLOW  YELLOW Final    Appearance, UA 02/26/2024 CLEAR  CLEAR Final    Specific Gravity, UA 02/26/2024 1.017  1.001 - 1.035 Final    pH, UA 02/26/2024 7.0  5.0 - 8.0 Final    Glucose, UA 02/26/2024 NEGATIVE  NEGATIVE Final    Bilirubin, UA 02/26/2024 NEGATIVE  NEGATIVE Final    Ketones, UA 02/26/2024 NEGATIVE  NEGATIVE Final    Occult Blood UA 02/26/2024 NEGATIVE  NEGATIVE Final    Protein, UA 02/26/2024 1+ (A)  NEGATIVE Final    Nitrite, UA 02/26/2024 NEGATIVE  NEGATIVE Final    Leukocytes, UA 02/26/2024 NEGATIVE  NEGATIVE Final    WBC Casts, UA 02/26/2024 NONE SEEN  < OR = 5 /HPF Final    RBC Casts, UA 02/26/2024 NONE SEEN  < OR = 2 /HPF Final    Squam Epithel, UA 02/26/2024 NONE SEEN  < OR = 5 /HPF Final    Bacteria, UA 02/26/2024 NONE SEEN  NONE SEEN /HPF Final    Hyaline Casts, UA 02/26/2024 NONE SEEN  NONE SEEN /LPF Final    Service Cmt: 02/26/2024    Final    Reflexive Urine Culture 02/26/2024    Final    Creatinine, Urine 02/02/2024 65  20 - 275 mg/dL Final    Microalb, Ur 02/02/2024 1.9  See  Note: mg/dL Final    Microalb/Creat Ratio 2024 29  <30 mcg/mg creat Final    Color, UA 2024 YELLOW  YELLOW Final    Appearance, UA 2024 CLEAR  CLEAR Final    Specific Gravity, UA 2024 1.010  1.001 - 1.035 Final    pH, UA 2024 6.0  5.0 - 8.0 Final    Glucose, UA 2024 NEGATIVE  NEGATIVE Final    Bilirubin, UA 2024 NEGATIVE  NEGATIVE Final    Ketones, UA 2024 NEGATIVE  NEGATIVE Final    Occult Blood UA 2024 NEGATIVE  NEGATIVE Final    Protein, UA 2024 NEGATIVE  NEGATIVE Final    Nitrite, UA 2024 NEGATIVE  NEGATIVE Final    Leukocytes, UA 2024 NEGATIVE  NEGATIVE Final    WBC Casts, UA 2024 0-5  < OR = 5 /HPF Final    RBC Casts, UA 2024 NONE SEEN  < OR = 2 /HPF Final    Squam Epithel, UA 2024 0-5  < OR = 5 /HPF Final    Bacteria, UA 2024 NONE SEEN  NONE SEEN /HPF Final    Hyaline Casts, UA 2024 NONE SEEN  NONE SEEN /LPF Final    Service Cmt: 2024    Final    Reflexive Urine Culture 2024    Final       Past Medical History:   Diagnosis Date    Anemia, unspecified     Arthritis     Asthma     COPD (chronic obstructive pulmonary disease)     Encounter for blood transfusion     Hypertension     Obese body habitus     Wound infection 2019    Right knee     Past Surgical History:   Procedure Laterality Date    ANGIOGRAM, CORONARY, WITH LEFT HEART CATHETERIZATION N/A 2022    Procedure: Angiogram, Coronary, with Left Heart Cath;  Surgeon: Jorge Tran MD;  Location: [facility] CATH/EP LAB;  Service: Cardiology;  Laterality: N/A;     SECTION      ECHOCARDIOGRAM,TRANSESOPHAGEAL N/A 2023    Procedure: Transesophageal echo (MARIANO) intra-procedure log documentation;  Surgeon: Araceli Sue Diagnostic;  Location: [facility] EP LAB;  Service: Cardiology;  Laterality: N/A;    INCISION AND DRAINAGE OF HEMATOMA Left 2024    Procedure: INCISION AND DRAINAGE, HEMATOMA;  Surgeon: Bryson Huerta MD;   Location: [facility] OR;  Service: Orthopedics;  Laterality: Left;    JOINT REPLACEMENT      Knee    KNEE ARTHROPLASTY Right 8/14/2019    Procedure: ARTHROPLASTY, KNEE;  Surgeon: Delio Chnug MD;  Location: [facility] OR;  Service: Orthopedics;  Laterality: Right;  anesthesia:  general and block    REPLACEMENT OF WOUND VACUUM-ASSISTED CLOSURE DEVICE Right 9/12/2019    Procedure: REPLACEMENT, WOUND VAC;  Surgeon: Delio Chung MD;  Location: [facility] OR;  Service: Orthopedics;  Laterality: Right;    TONSILLECTOMY      TREATMENT OF CARDIAC ARRHYTHMIA N/A 8/31/2023    Procedure: Cardioversion or Defibrillation;  Surgeon: PJ Betancourt MD;  Location: [facility] EP LAB;  Service: Cardiology;  Laterality: N/A;  AF, MARIANO, DCCV, MAC, EH, 3 Prep    VENTRICULOGRAM, LEFT  12/23/2022    Procedure: Ventriculogram, Left;  Surgeon: Jorge Tran MD;  Location: [facility] CATH/EP LAB;  Service: Cardiology;;     Family History   Problem Relation Name Age of Onset    Alzheimer's disease Mother         Tests to Keep You Healthy    Mammogram: Met on 12/5/2023  Colon Cancer Screening: ORDERED  Last Blood Pressure <= 139/89 (6/10/2024): Yes  Tobacco Cessation: NO      The CVD Risk score (D'Agostino, et al., 2008) failed to calculate for the following reasons:    The patient has a prior MI, stroke, CHF, or peripheral vascular disease diagnosis     Marital Status:   Alcohol History:  reports current alcohol use.  Tobacco History:  reports that she has been smoking cigarettes. She started smoking about 49 years ago. She has a 23.6 pack-year smoking history. She has been exposed to tobacco smoke. She has never used smokeless tobacco.  Drug History:  reports no history of drug use.    Health Maintenance Topics with due status: Not Due       Topic Last Completion Date    TETANUS VACCINE 09/18/2019    Hemoglobin A1c (Diabetic Prevention Screening) 12/23/2022    LDCT Lung Screen 12/05/2023    Mammogram 12/05/2023    DEXA Scan 12/05/2023    Lipid Panel  02/02/2024    Annual [facility] 02/26/2024     Immunization History   Administered Date(s) Administered    COVID-19 MRNA, LN-S PF (MODERNA HALF 0.25 ML DOSE) 12/14/2021    COVID-19, MRNA, LN-S, PF (MODERNA FULL 0.5 ML DOSE) 02/26/2021, 03/26/2021    Influenza (FLUAD) - Quadrivalent - Adjuvanted - PF *Preferred* (65+) 11/09/2023    Influenza - High Dose - PF (65 years and older) 09/18/2019    Influenza - Quadrivalent - High Dose - PF (65 years and older) 08/20/2020, 10/25/2021, 11/08/2022    PPD Test 09/04/2019, 02/09/2024    Pneumococcal Conjugate - 13 Valent 03/06/2019    Pneumococcal Polysaccharide - 23 Valent 07/21/2020    Tdap 09/18/2019       Review of patient's allergies indicates:  No Known Allergies    Current Outpatient Medications:     amiodarone (PACERONE) 200 MG Tab, Take 1 tablet (200 mg total) by mouth once daily., Disp: 30 tablet, Rfl: 11    amLODIPine (NORVASC) 2.5 MG tablet, Take 1 tablet (2.5 mg total) by mouth every evening., Disp: 90 tablet, Rfl: 3    apixaban (ELIQUIS) 5 mg Tab, Take 1 tablet (5 mg total) by mouth 2 (two) times daily., Disp: 180 tablet, Rfl: 3    atorvastatin (LIPITOR) 10 MG tablet, Take 1 tablet (10 mg total) by mouth every evening., Disp: 90 tablet, Rfl: 3    betamethasone dipropionate 0.05 % cream, Apply topically 2 (two) times daily., Disp: 45 g, Rfl: 2    buPROPion (WELLBUTRIN SR) 150 MG TBSR 12 hr tablet, Take 1 tablet (150 mg total) by mouth 2 (two) times daily., Disp: 180 tablet, Rfl: 3    ergocalciferol (ERGOCALCIFEROL) 50,000 unit Cap, Take 50,000 Units by mouth every 7 days., Disp: , Rfl:     FLUoxetine 40 MG capsule, Take 1 capsule (40 mg total) by mouth once daily., Disp: 90 capsule, Rfl: 3    gabapentin (NEURONTIN) 300 MG capsule, Take 1 capsule (300 mg total) by mouth 3 (three) times daily., Disp: 270 capsule, Rfl: 3    HYDROcodone-acetaminophen (NORCO) 5-325 mg per tablet, Take 1 tablet by mouth every 6 (six) hours as needed for Pain., Disp: 90 tablet, Rfl: 0     "loratadine (CLARITIN) 10 mg tablet, Take 10 mg by mouth once daily., Disp: , Rfl:     methocarbamoL (ROBAXIN) 750 MG Tab, Take 1 tablet (750 mg total) by mouth nightly as needed (muscle pain/spasms)., Disp: 20 tablet, Rfl: 1    metoprolol succinate (TOPROL-XL) 25 MG 24 hr tablet, Take 1 tablet (25 mg total) by mouth once daily., Disp: 90 tablet, Rfl: 3    traZODone (DESYREL) 50 MG tablet, Take 2 tablets (100 mg total) by mouth nightly as needed for Insomnia., Disp: 180 tablet, Rfl: 1  No current facility-administered medications for this visit.    Facility-Administered Medications Ordered in Other Visits:     lidocaine (PF) 10 mg/ml (1%) injection 5 mg, 0.5 mL, Intradermal, Once, Azeem Anderson MD    Review of Systems   Constitutional:  Negative for appetite change, chills and fever.   HENT:  Negative for sore throat and trouble swallowing.    Eyes:  Negative for visual disturbance.   Respiratory:  Positive for shortness of breath (only with heavy exertion). Negative for cough and wheezing.    Cardiovascular:  Negative for chest pain, palpitations and leg swelling.   Genitourinary:  Positive for frequency. Negative for dysuria and hematuria.   Musculoskeletal:  Positive for arthralgias (left shoulder/upper arm pain, left knee pain) and gait problem (using rollator or WC). Negative for joint swelling (left knee swelling nearly resolved).   Skin:  Negative for color change and wound.   Neurological:  Positive for dizziness (intermittent dizziness when she first stands up). Negative for syncope, speech difficulty and headaches.   Hematological:  Bruises/bleeds easily.   Psychiatric/Behavioral:  Negative for dysphoric mood. The patient is not nervous/anxious.           Objective:      Vitals:    06/10/24 1611   BP: 120/74   Pulse: 76   SpO2: 98%   Weight: 99.8 kg (220 lb)   Height: 5' 3" (1.6 m)     Physical Exam  Vitals and nursing note reviewed.   Constitutional:       General: She is not in acute distress.     " Appearance: She is well-developed.      Comments: Morbidly obese white female, appears older than stated age sitting in WC   HENT:      Head: Normocephalic and atraumatic.      Comments: Forehead laceration well healed and swelling resolved  Neck:      Vascular: No carotid bruit.   Cardiovascular:      Rate and Rhythm: Normal rate. Rhythm irregular.      Heart sounds: Murmur (2nd ICS RSB) heard.      Systolic murmur is present with a grade of 3/6.      No friction rub. No gallop.   Pulmonary:      Effort: Pulmonary effort is normal. No respiratory distress.      Breath sounds: Normal breath sounds. No wheezing or rales.   Abdominal:      General: There is no distension.      Palpations: Abdomen is soft.      Tenderness: There is no abdominal tenderness.   Musculoskeletal:      Left shoulder: No swelling, deformity, tenderness or bony tenderness. Decreased range of motion (pain with abduction beyond 80-90 degrees). Normal strength.      Cervical back: Neck supple.      Left knee: No erythema. Normal range of motion. No tenderness.      Right lower leg: Edema present.      Left lower leg: Edema (trace to 1+ pitting edema lower calves/ankles, no erythema/ulcers) present.      Comments: Left knee wound healed   Lymphadenopathy:      Cervical: No cervical adenopathy.   Skin:     General: Skin is warm and dry.      Findings: No rash.   Neurological:      General: No focal deficit present.      Mental Status: She is alert and oriented to person, place, and time.      Comments: Gait not assessed           Assessment:       1. Persistent atrial fibrillation    2. Panlobular emphysema    3. HFrEF (heart failure with reduced ejection fraction)    4. Stage 3b chronic kidney disease    5. Primary osteoarthritis of both knees    6. Moderate aortic stenosis    7. Mild episode of recurrent major depressive disorder    8. Aortic atherosclerosis           Plan:       1. Persistent atrial fibrillation  -rate controlled, encouraged pt  to schedule f/u with cards  -     metoprolol succinate (TOPROL-XL) 25 MG 24 hr tablet; Take 1 tablet (25 mg total) by mouth once daily.  Dispense: 90 tablet; Refill: 3    2. Panlobular emphysema   -stable, still smoking but down to 2-5 cigs/day. UTD with LDCT    3. HFrEF (heart failure with reduced ejection fraction)   -stable, appears well compensated    4. Stage 3b chronic kidney disease  -stable on last labs, repeat before next visit  -     Basic Metabolic Panel; Future; Expected date: 06/10/2024  -     CBC Auto Differential; Future; Expected date: 06/10/2024    5. Primary osteoarthritis of both knees   -wound now healed, still has significant pain to knee    6. Moderate aortic stenosis   -stable, denies CP or syncope    7. Mild episode of recurrent major depressive disorder  -stalbe on med  -     buPROPion (WELLBUTRIN SR) 150 MG TBSR 12 hr tablet; Take 1 tablet (150 mg total) by mouth 2 (two) times daily.  Dispense: 180 tablet; Refill: 3    8. Aortic atherosclerosis   -moderate aortic calcifications on CT scan, denies claudication    9. Dyslipidemia   -lipids well controlled on last labs    Other orders  -     betamethasone dipropionate 0.05 % cream; Apply topically 2 (two) times daily.  Dispense: 45 g; Refill: 2      Follow up in about 3 months (around 9/10/2024) for HTN, COPD, arthritis, Labs before next visit.      Encouraged pt to collect cologuard stool test at home    Counseled on age and gender appropriate medical preventative services, including cancer screenings, immunizations, overall nutritional health, need for a consistent exercise regimen and an overall push towards maintaining a vigorous and active lifestyle.      6/10/2024 Roselyn Cote NP

## 2024-07-09 ENCOUNTER — EXTERNAL HOME HEALTH (OUTPATIENT)
Dept: HOME HEALTH SERVICES | Facility: HOSPITAL | Age: 69
End: 2024-07-09
Payer: MEDICARE

## 2024-07-10 DIAGNOSIS — S01.81XD LACERATION OF FOREHEAD, SUBSEQUENT ENCOUNTER: ICD-10-CM

## 2024-07-10 RX ORDER — HYDROCODONE BITARTRATE AND ACETAMINOPHEN 5; 325 MG/1; MG/1
1 TABLET ORAL EVERY 6 HOURS PRN
Qty: 90 TABLET | Refills: 0 | Status: SHIPPED | OUTPATIENT
Start: 2024-07-10

## 2024-07-10 NOTE — TELEPHONE ENCOUNTER
----- Message from Amaya Porter sent at 7/10/2024 10:48 AM CDT -----  Refill Hydrocodone [facility] on [address]. Pt's # [phone]

## 2024-08-08 ENCOUNTER — DOCUMENT SCAN (OUTPATIENT)
Dept: HOME HEALTH SERVICES | Facility: HOSPITAL | Age: 69
End: 2024-08-08
Payer: MEDICARE

## 2024-08-09 DIAGNOSIS — S01.81XD LACERATION OF FOREHEAD, SUBSEQUENT ENCOUNTER: ICD-10-CM

## 2024-08-09 RX ORDER — HYDROCODONE BITARTRATE AND ACETAMINOPHEN 5; 325 MG/1; MG/1
1 TABLET ORAL EVERY 6 HOURS PRN
Qty: 90 TABLET | Refills: 0 | Status: SHIPPED | OUTPATIENT
Start: 2024-10-08

## 2024-08-09 RX ORDER — HYDROCODONE BITARTRATE AND ACETAMINOPHEN 5; 325 MG/1; MG/1
1 TABLET ORAL EVERY 6 HOURS PRN
Qty: 90 TABLET | Refills: 0 | Status: SHIPPED | OUTPATIENT
Start: 2024-08-09

## 2024-08-09 RX ORDER — HYDROCODONE BITARTRATE AND ACETAMINOPHEN 5; 325 MG/1; MG/1
1 TABLET ORAL EVERY 6 HOURS PRN
Qty: 90 TABLET | Refills: 0 | Status: SHIPPED | OUTPATIENT
Start: 2024-09-08

## 2024-08-11 PROCEDURE — G0179 MD RECERTIFICATION HHA PT: HCPCS | Mod: ,,, | Performed by: ORTHOPAEDIC SURGERY

## 2024-08-19 ENCOUNTER — EXTERNAL HOME HEALTH (OUTPATIENT)
Dept: HOME HEALTH SERVICES | Facility: HOSPITAL | Age: 69
End: 2024-08-19
Payer: MEDICARE

## 2024-08-23 ENCOUNTER — TELEPHONE (OUTPATIENT)
Dept: FAMILY MEDICINE | Facility: CLINIC | Age: 69
End: 2024-08-23
Payer: MEDICARE

## 2024-08-23 DIAGNOSIS — Z79.899 ENCOUNTER FOR LONG-TERM (CURRENT) USE OF OTHER MEDICATIONS: ICD-10-CM

## 2024-08-23 DIAGNOSIS — D50.0 BLOOD LOSS ANEMIA: ICD-10-CM

## 2024-08-23 DIAGNOSIS — E78.5 DYSLIPIDEMIA: Primary | ICD-10-CM

## 2024-08-23 DIAGNOSIS — N18.32 STAGE 3B CHRONIC KIDNEY DISEASE: ICD-10-CM

## 2024-08-23 NOTE — TELEPHONE ENCOUNTER
Spoke with pt in regards to pre-visit labs. Verbalized that we have placed lab order for you to have done before your upcoming appointment on 09/10/2024. Verbalized that the lab order are placed through [facility], so they can come into the office anytime, no appointment necessary. Just make sure that you are fasting for you labs. Pt acknowledge understanding.

## 2024-09-04 ENCOUNTER — TELEPHONE (OUTPATIENT)
Dept: FAMILY MEDICINE | Facility: CLINIC | Age: 69
End: 2024-09-04
Payer: MEDICARE

## 2024-09-04 NOTE — TELEPHONE ENCOUNTER
----- Message from Amaya Porter sent at 9/4/2024  8:38 AM CDT -----  The patient said she got a message saying she has to do labs 2 times. She is confused Please call pt's # [phone] GH

## 2024-09-06 ENCOUNTER — TELEPHONE (OUTPATIENT)
Dept: FAMILY MEDICINE | Facility: CLINIC | Age: 69
End: 2024-09-06
Payer: MEDICARE

## 2024-09-06 NOTE — TELEPHONE ENCOUNTER
Spoke to Jayson and she stated she needs lab orders faxed, and she wanted to let us know pt keeps falling, so it can be talked about at her appointment. Also pt has a small Skin tear on her left hand and jayson has been cleaning it. She is sending over wound care orders for us

## 2024-09-06 NOTE — TELEPHONE ENCOUNTER
----- Message from Chioma Diaz sent at 9/6/2024 10:16 AM CDT -----  [facility] to see what the lab orders are and when does pt need the labs to be done. Pt has had 4 more falls within the last week and will be sending a wound care request over.   [phone]

## 2024-09-10 ENCOUNTER — OFFICE VISIT (OUTPATIENT)
Dept: FAMILY MEDICINE | Facility: CLINIC | Age: 69
End: 2024-09-10
Payer: MEDICARE

## 2024-09-10 VITALS
DIASTOLIC BLOOD PRESSURE: 82 MMHG | BODY MASS INDEX: 39.69 KG/M2 | OXYGEN SATURATION: 98 % | SYSTOLIC BLOOD PRESSURE: 132 MMHG | HEIGHT: 63 IN | HEART RATE: 92 BPM | WEIGHT: 224 LBS

## 2024-09-10 DIAGNOSIS — Z12.31 ENCOUNTER FOR SCREENING MAMMOGRAM FOR BREAST CANCER: ICD-10-CM

## 2024-09-10 DIAGNOSIS — Z12.11 SCREENING FOR COLON CANCER: ICD-10-CM

## 2024-09-10 DIAGNOSIS — N18.4 HYPERTENSIVE KIDNEY DISEASE WITH STAGE 4 CHRONIC KIDNEY DISEASE: Primary | ICD-10-CM

## 2024-09-10 DIAGNOSIS — S01.81XD LACERATION OF FOREHEAD, SUBSEQUENT ENCOUNTER: ICD-10-CM

## 2024-09-10 DIAGNOSIS — I12.9 HYPERTENSIVE KIDNEY DISEASE WITH STAGE 4 CHRONIC KIDNEY DISEASE: Primary | ICD-10-CM

## 2024-09-10 DIAGNOSIS — R29.6 RECURRENT FALLS: ICD-10-CM

## 2024-09-10 DIAGNOSIS — Z51.81 ENCOUNTER FOR THERAPEUTIC DRUG MONITORING: ICD-10-CM

## 2024-09-10 DIAGNOSIS — M17.0 PRIMARY OSTEOARTHRITIS OF BOTH KNEES: ICD-10-CM

## 2024-09-10 DIAGNOSIS — Z79.899 ENCOUNTER FOR LONG-TERM (CURRENT) USE OF OTHER MEDICATIONS: ICD-10-CM

## 2024-09-10 DIAGNOSIS — N18.4 STAGE 4 CHRONIC KIDNEY DISEASE: ICD-10-CM

## 2024-09-10 DIAGNOSIS — F17.210 CIGARETTE SMOKER: ICD-10-CM

## 2024-09-10 DIAGNOSIS — I35.0 MILD AORTIC STENOSIS: ICD-10-CM

## 2024-09-10 DIAGNOSIS — I48.19 PERSISTENT ATRIAL FIBRILLATION: ICD-10-CM

## 2024-09-10 DIAGNOSIS — E78.5 DYSLIPIDEMIA: ICD-10-CM

## 2024-09-10 DIAGNOSIS — J44.9 CHRONIC OBSTRUCTIVE PULMONARY DISEASE, UNSPECIFIED COPD TYPE: ICD-10-CM

## 2024-09-10 DIAGNOSIS — E66.01 MORBID OBESITY: ICD-10-CM

## 2024-09-10 PROCEDURE — 3075F SYST BP GE 130 - 139MM HG: CPT | Mod: CPTII,S$GLB,, | Performed by: NURSE PRACTITIONER

## 2024-09-10 PROCEDURE — 3060F POS MICROALBUMINURIA REV: CPT | Mod: CPTII,S$GLB,, | Performed by: NURSE PRACTITIONER

## 2024-09-10 PROCEDURE — 1160F RVW MEDS BY RX/DR IN RCRD: CPT | Mod: CPTII,S$GLB,, | Performed by: NURSE PRACTITIONER

## 2024-09-10 PROCEDURE — 3008F BODY MASS INDEX DOCD: CPT | Mod: CPTII,S$GLB,, | Performed by: NURSE PRACTITIONER

## 2024-09-10 PROCEDURE — 99406 BEHAV CHNG SMOKING 3-10 MIN: CPT | Mod: S$GLB,,, | Performed by: NURSE PRACTITIONER

## 2024-09-10 PROCEDURE — 99214 OFFICE O/P EST MOD 30 MIN: CPT | Mod: 25,S$GLB,, | Performed by: NURSE PRACTITIONER

## 2024-09-10 PROCEDURE — 3288F FALL RISK ASSESSMENT DOCD: CPT | Mod: CPTII,S$GLB,, | Performed by: NURSE PRACTITIONER

## 2024-09-10 PROCEDURE — 1159F MED LIST DOCD IN RCRD: CPT | Mod: CPTII,S$GLB,, | Performed by: NURSE PRACTITIONER

## 2024-09-10 PROCEDURE — 3066F NEPHROPATHY DOC TX: CPT | Mod: CPTII,S$GLB,, | Performed by: NURSE PRACTITIONER

## 2024-09-10 PROCEDURE — 1125F AMNT PAIN NOTED PAIN PRSNT: CPT | Mod: CPTII,S$GLB,, | Performed by: NURSE PRACTITIONER

## 2024-09-10 PROCEDURE — 1100F PTFALLS ASSESS-DOCD GE2>/YR: CPT | Mod: CPTII,S$GLB,, | Performed by: NURSE PRACTITIONER

## 2024-09-10 PROCEDURE — 3079F DIAST BP 80-89 MM HG: CPT | Mod: CPTII,S$GLB,, | Performed by: NURSE PRACTITIONER

## 2024-09-10 RX ORDER — HYDROCODONE BITARTRATE AND ACETAMINOPHEN 5; 325 MG/1; MG/1
1 TABLET ORAL EVERY 6 HOURS PRN
Qty: 90 TABLET | Refills: 0 | Status: SHIPPED | OUTPATIENT
Start: 2024-12-07

## 2024-09-10 RX ORDER — HYDROCODONE BITARTRATE AND ACETAMINOPHEN 5; 325 MG/1; MG/1
1 TABLET ORAL EVERY 6 HOURS PRN
Qty: 90 TABLET | Refills: 0 | Status: SHIPPED | OUTPATIENT
Start: 2024-11-07

## 2024-09-10 NOTE — TELEPHONE ENCOUNTER
Pt is needing a refill on her Olmito. Pt is here today for a office visit. Chart shows last dispense 08/09/2024. UDS ordered today.     Pt has prescription for her Norco for  the month of October 10/08/2024.

## 2024-09-10 NOTE — PROGRESS NOTES
SUBJECTIVE:    Patient ID: Iris Mueller is a 68 y.o. female.    Chief Complaint: Follow-up (Nol bottles//Pt is here for a check up and medication refills//Mammo ordered today//Cologuard ordered toady//JAIME )    Pt here for regular f/u- HTN, AS, lipids, OA. Pt here with her     Pt reports overall she's doing okay though home health called last week reporting 4 recent falls. Pt reports has tripped or gotten her feet tangled up a few times which is the cause of her falls. Denies any dizziness or LOC with falls. Denies any head trauma.  PT was ordered last week to begin again.    Left knee wound completely healed. Still c/o's of knee pain however. Ambulates with rollator    Hx of CKD3- Has previously seen , renal but hasn't seen her in awhile and renal fxn slightly declined on recent labs. Denies any NSAID use    Pt has previously also seen Dr. Tran for CHF, AS and Afib though thania't followed up since 11/2023. Remains on amio and eliquis. Denies palpitations, CP or dizziness    Still smoking about 3-5 cigs/day          Telephone on 08/23/2024   Component Date Value Ref Range Status    WBC 09/09/2024 6.4  3.8 - 10.8 Thousand/uL Final    RBC 09/09/2024 3.68 (L)  3.80 - 5.10 Million/uL Final    Hemoglobin 09/09/2024 10.1 (L)  11.7 - 15.5 g/dL Final    Hematocrit 09/09/2024 34.0 (L)  35.0 - 45.0 % Final    MCV 09/09/2024 92.4  80.0 - 100.0 fL Final    MCH 09/09/2024 27.4  27.0 - 33.0 pg Final    MCHC 09/09/2024 29.7 (L)  32.0 - 36.0 g/dL Final    RDW 09/09/2024 15.9 (H)  11.0 - 15.0 % Final    Platelets 09/09/2024 269  140 - 400 Thousand/uL Final    MPV 09/09/2024 9.2  7.5 - 12.5 fL Final    Neutrophils, Abs 09/09/2024 4,813  1,500 - 7,800 cells/uL Final    Lymph # 09/09/2024 909  850 - 3,900 cells/uL Final    Mono # 09/09/2024 422  200 - 950 cells/uL Final    Eos # 09/09/2024 218  15 - 500 cells/uL Final    Baso # 09/09/2024 38  0 - 200 cells/uL Final    Neutrophils Relative 09/09/2024 75.2  %  Final    Lymph % 09/09/2024 14.2  % Final    Mono % 09/09/2024 6.6  % Final    Eosinophil % 09/09/2024 3.4  % Final    Basophil % 09/09/2024 0.6  % Final    Glucose 09/09/2024 85  65 - 99 mg/dL Final    BUN 09/09/2024 21  7 - 25 mg/dL Final    Creatinine 09/09/2024 2.26 (H)  0.60 - 1.00 mg/dL Final    eGFR 09/09/2024 23 (L)  > OR = 60 mL/min/1.73m2 Final    BUN/Creatinine Ratio 09/09/2024 9  6 - 22 (calc) Final    Sodium 09/09/2024 135  135 - 146 mmol/L Final    Potassium 09/09/2024 3.4 (L)  3.5 - 5.3 mmol/L Final    Chloride 09/09/2024 100  98 - 110 mmol/L Final    CO2 09/09/2024 25  20 - 32 mmol/L Final    Calcium 09/09/2024 8.6  8.6 - 10.4 mg/dL Final    Total Protein 09/09/2024 6.3  6.1 - 8.1 g/dL Final    Albumin 09/09/2024 3.7  3.6 - 5.1 g/dL Final    Globulin, Total 09/09/2024 2.6  1.9 - 3.7 g/dL (calc) Final    Albumin/Globulin Ratio 09/09/2024 1.4  1.0 - 2.5 (calc) Final    Total Bilirubin 09/09/2024 0.7  0.2 - 1.2 mg/dL Final    Alkaline Phosphatase 09/09/2024 114  37 - 153 U/L Final    AST 09/09/2024 13  10 - 35 U/L Final    ALT 09/09/2024 10  6 - 29 U/L Final    Ferritin 09/09/2024 15 (L)  16 - 288 ng/mL Final    Cholesterol 09/09/2024 123  <200 mg/dL Final    HDL 09/09/2024 49 (L)  > OR = 50 mg/dL Final    Triglycerides 09/09/2024 79  <150 mg/dL Final    LDL Cholesterol 09/09/2024 58  mg/dL (calc) Final    HDL/Cholesterol Ratio 09/09/2024 2.5  <5.0 (calc) Final    Non HDL Chol. (LDL+VLDL) 09/09/2024 74  <130 mg/dL (calc) Final       Past Medical History:   Diagnosis Date    Anemia, unspecified     Arthritis     Asthma     COPD (chronic obstructive pulmonary disease)     Encounter for blood transfusion     Hypertension     Obese body habitus     Wound infection 08/2019    Right knee     Past Surgical History:   Procedure Laterality Date    ANGIOGRAM, CORONARY, WITH LEFT HEART CATHETERIZATION N/A 12/23/2022    Procedure: Angiogram, Coronary, with Left Heart Cath;  Surgeon: Jorge Tran MD;  Location:  [facility] CATH/EP LAB;  Service: Cardiology;  Laterality: N/A;     SECTION      ECHOCARDIOGRAM,TRANSESOPHAGEAL N/A 2023    Procedure: Transesophageal echo (MARIANO) intra-procedure log documentation;  Surgeon: Vikram, Araceli Diagnostic;  Location: [facility] EP LAB;  Service: Cardiology;  Laterality: N/A;    INCISION AND DRAINAGE OF HEMATOMA Left 2024    Procedure: INCISION AND DRAINAGE, HEMATOMA;  Surgeon: Bryson Huerta MD;  Location: [facility] OR;  Service: Orthopedics;  Laterality: Left;    JOINT REPLACEMENT      Knee    KNEE ARTHROPLASTY Right 2019    Procedure: ARTHROPLASTY, KNEE;  Surgeon: Delio Chung MD;  Location: [facility] OR;  Service: Orthopedics;  Laterality: Right;  anesthesia:  general and block    REPLACEMENT OF WOUND VACUUM-ASSISTED CLOSURE DEVICE Right 2019    Procedure: REPLACEMENT, WOUND VAC;  Surgeon: Delio Chung MD;  Location: [facility] OR;  Service: Orthopedics;  Laterality: Right;    TONSILLECTOMY      TREATMENT OF CARDIAC ARRHYTHMIA N/A 2023    Procedure: Cardioversion or Defibrillation;  Surgeon: PJ Betancourt MD;  Location: [facility] EP LAB;  Service: Cardiology;  Laterality: N/A;  AF, MARIANO, DCCV, MAC, EH, 3 Prep    VENTRICULOGRAM, LEFT  2022    Procedure: Ventriculogram, Left;  Surgeon: Jorge Tran MD;  Location: [facility] CATH/EP LAB;  Service: Cardiology;;     Family History   Problem Relation Name Age of Onset    Alzheimer's disease Mother         Tests to Keep You Healthy    Mammogram: Met on 2023  Colon Cancer Screening: ORDERED  Last Blood Pressure <= 139/89 (9/10/2024): Yes  Tobacco Cessation: NO      The CVD Risk score (D'Agostino, et al., 2008) failed to calculate for the following reasons:    The patient has a prior MI, stroke, CHF, or peripheral vascular disease diagnosis     Marital Status:   Alcohol History:  reports current alcohol use.  Tobacco History:  reports that she has been smoking cigarettes. She started smoking about 49 years  ago. She has a 23.7 pack-year smoking history. She has been exposed to tobacco smoke. She has never used smokeless tobacco.  Drug History:  reports no history of drug use.    Health Maintenance Topics with due status: Not Due       Topic Last Completion Date    TETANUS VACCINE 09/18/2019    Hemoglobin A1c (Diabetic Prevention Screening) 12/23/2022    LDCT Lung Screen 12/05/2023    DEXA Scan 12/05/2023    Annual UACr 02/26/2024    Lipid Panel 09/09/2024     Immunization History   Administered Date(s) Administered    COVID-19 MRNA, LN-S PF (MODERNA HALF 0.25 ML DOSE) 12/14/2021    COVID-19, MRNA, LN-S, PF (MODERNA FULL 0.5 ML DOSE) 02/26/2021, 03/26/2021    Influenza (FLUAD) - Quadrivalent - Adjuvanted - PF *Preferred* (65+) 11/09/2023    Influenza - High Dose - PF (65 years and older) 09/18/2019    Influenza - Quadrivalent - High Dose - PF (65 years and older) 08/20/2020, 10/25/2021, 11/08/2022    PPD Test 09/04/2019, 02/09/2024    Pneumococcal Conjugate - 13 Valent 03/06/2019    Pneumococcal Polysaccharide - 23 Valent 07/21/2020    Tdap 09/18/2019       Review of patient's allergies indicates:  No Known Allergies    Current Outpatient Medications:     amiodarone (PACERONE) 200 MG Tab, Take 1 tablet (200 mg total) by mouth once daily., Disp: 30 tablet, Rfl: 11    amLODIPine (NORVASC) 2.5 MG tablet, Take 1 tablet (2.5 mg total) by mouth every evening., Disp: 90 tablet, Rfl: 3    apixaban (ELIQUIS) 5 mg Tab, Take 1 tablet (5 mg total) by mouth 2 (two) times daily., Disp: 180 tablet, Rfl: 3    atorvastatin (LIPITOR) 10 MG tablet, Take 1 tablet (10 mg total) by mouth every evening., Disp: 90 tablet, Rfl: 3    betamethasone dipropionate 0.05 % cream, Apply topically 2 (two) times daily., Disp: 45 g, Rfl: 2    buPROPion (WELLBUTRIN SR) 150 MG TBSR 12 hr tablet, Take 1 tablet (150 mg total) by mouth 2 (two) times daily., Disp: 180 tablet, Rfl: 3    ergocalciferol (ERGOCALCIFEROL) 50,000 unit Cap, Take 50,000 Units by mouth  every 7 days., Disp: , Rfl:     FLUoxetine 40 MG capsule, Take 1 capsule (40 mg total) by mouth once daily., Disp: 90 capsule, Rfl: 3    gabapentin (NEURONTIN) 300 MG capsule, Take 1 capsule (300 mg total) by mouth 3 (three) times daily., Disp: 270 capsule, Rfl: 3    HYDROcodone-acetaminophen (NORCO) 5-325 mg per tablet, Take 1 tablet by mouth every 6 (six) hours as needed for Pain., Disp: 90 tablet, Rfl: 0    [START ON 10/8/2024] HYDROcodone-acetaminophen (NORCO) 5-325 mg per tablet, Take 1 tablet by mouth every 6 (six) hours as needed for Pain., Disp: 90 tablet, Rfl: 0    loratadine (CLARITIN) 10 mg tablet, Take 10 mg by mouth once daily., Disp: , Rfl:     methocarbamoL (ROBAXIN) 750 MG Tab, Take 1 tablet (750 mg total) by mouth nightly as needed (muscle pain/spasms)., Disp: 20 tablet, Rfl: 1    metoprolol succinate (TOPROL-XL) 25 MG 24 hr tablet, Take 1 tablet (25 mg total) by mouth once daily., Disp: 90 tablet, Rfl: 3    traZODone (DESYREL) 50 MG tablet, Take 2 tablets (100 mg total) by mouth nightly as needed for Insomnia., Disp: 180 tablet, Rfl: 1  No current facility-administered medications for this visit.    Facility-Administered Medications Ordered in Other Visits:     lidocaine (PF) 10 mg/ml (1%) injection 5 mg, 0.5 mL, Intradermal, Once, Azeem Anderson MD    Review of Systems   Constitutional:  Negative for appetite change, chills and fever.   HENT:  Negative for sore throat and trouble swallowing.    Eyes:  Negative for visual disturbance.   Respiratory:  Positive for shortness of breath (only with heavy exertion). Negative for cough and wheezing.    Cardiovascular:  Negative for chest pain, palpitations and leg swelling.   Gastrointestinal:  Negative for abdominal pain, diarrhea, nausea and vomiting.   Genitourinary:  Positive for frequency. Negative for dysuria and hematuria.   Musculoskeletal:  Positive for arthralgias (left shoulder/upper arm pain, left knee pain) and gait problem (several  "recent falls). Negative for joint swelling.   Skin:  Negative for color change and wound.   Neurological:  Negative for dizziness, syncope, speech difficulty and headaches.   Hematological:  Bruises/bleeds easily.   Psychiatric/Behavioral:  Positive for sleep disturbance. Negative for dysphoric mood. The patient is not nervous/anxious.           Objective:      Vitals:    09/10/24 1116   BP: 132/82   Pulse: 92   SpO2: 98%   Weight: 101.6 kg (224 lb)   Height: 5' 3" (1.6 m)     Physical Exam  Vitals and nursing note reviewed.   Constitutional:       General: She is not in acute distress.     Appearance: She is well-developed.      Comments: Morbidly obese white female, appears older than stated age, ambulating with rollator   HENT:      Head: Normocephalic and atraumatic.      Comments: Forehead scar well healed     Right Ear: Tympanic membrane and ear canal normal.      Left Ear: Tympanic membrane and ear canal normal.   Neck:      Vascular: No carotid bruit.   Cardiovascular:      Rate and Rhythm: Normal rate. Rhythm irregular.      Heart sounds: Murmur (2nd ICS RSB) heard.      Systolic murmur is present with a grade of 3/6.      No friction rub. No gallop.   Pulmonary:      Effort: Pulmonary effort is normal. No respiratory distress.      Breath sounds: Normal breath sounds. No wheezing or rales.   Abdominal:      General: There is no distension.      Palpations: Abdomen is soft.      Tenderness: There is no abdominal tenderness.   Musculoskeletal:      Left shoulder: No swelling, deformity, tenderness or bony tenderness. Decreased range of motion (pain with abduction beyond 80-90 degrees). Normal strength.      Cervical back: Neck supple.      Left knee: No erythema. Normal range of motion. No tenderness.      Right lower leg: Edema (trace bilateral ankle edema) present.      Left lower leg: Edema present.   Lymphadenopathy:      Cervical: No cervical adenopathy.   Skin:     General: Skin is warm and dry.      " Findings: No rash.   Neurological:      General: No focal deficit present.      Mental Status: She is alert and oriented to person, place, and time.      Gait: Gait abnormal (antaglic gait).   Psychiatric:         Mood and Affect: Mood normal.           Assessment:       1. Hypertensive kidney disease with stage 4 chronic kidney disease    2. Stage 4 chronic kidney disease    3. Persistent atrial fibrillation    4. Dyslipidemia    5. Recurrent falls    6. Chronic obstructive pulmonary disease, unspecified COPD type    7. Primary osteoarthritis of both knees    8. Mild aortic stenosis    9. Encounter for long-term (current) use of other medications    10. Encounter for therapeutic drug monitoring    11. Screening for colon cancer    12. Encounter for screening mammogram for breast cancer    13. Cigarette smoker           Plan:       1. Hypertensive kidney disease with stage 4 chronic kidney disease   -BP well controlled    2. Stage 4 chronic kidney disease   -reviewed recent labs with pt with CR up to 2.25, GFR 23- recommend scheduling f/u with Dr. Deng. Cautioned to avoid NSAIDs and need to ensure BP is well controlled    3. Persistent atrial fibrillation   -rate stable, pt advised she needs to schedule f/u with cardiology    4. Dyslipidemia   -LDL well controlled at 58    5. Recurrent falls   -HH PT ordered last week. Pt cautioned she needs to be evaluated in ER for any falls with head trauma given eliquis    6. Chronic obstructive pulmonary disease, unspecified COPD type   -stable though continues to smoke a few cigs/day    7. Primary osteoarthritis of both knees  -stable  -     DRUG MONITOR, PANEL 4, W/CONF, URINE; Future; Expected date: 09/10/2024    8. Mild aortic stenosis   -stable, denies CP or syncope    9. Encounter for long-term (current) use of other medications  -     DRUG MONITOR, PANEL 4, W/CONF, URINE; Future; Expected date: 09/10/2024    10. Encounter for therapeutic drug monitoring  -     DRUG  MONITOR, PANEL 4, W/CONF, URINE; Future; Expected date: 09/10/2024    11. Screening for colon cancer  -discussed imp of colon CA screening. She declines cscope but is agreeable to cologuard  -     Cologuard Screening (Multitarget Stool DNA); Future; Expected date: 09/10/2024    12. Encounter for screening mammogram for breast cancer  -     Mammo Digital Screening Bilat; Future; Expected date: 12/06/2024    13. Cigarette smoker   -smoking cessation counseling provided  Assistance with smoking cessation was offered, including:  [x]  Medications  [x]  Counseling  []  Printed Information on Smoking Cessation  [x]  Referral to a Smoking Cessation Program    Patient was counseled regarding smoking for 3-10 minutes.     Follow up in about 3 months (around 12/10/2024) for UDS today.          Counseled on age and gender appropriate medical preventative services, including cancer screenings, immunizations, overall nutritional health, need for a consistent exercise regimen and an overall push towards maintaining a vigorous and active lifestyle.      9/10/2024 Roselyn Cote NP

## 2024-09-11 ENCOUNTER — DOCUMENT SCAN (OUTPATIENT)
Dept: HOME HEALTH SERVICES | Facility: HOSPITAL | Age: 69
End: 2024-09-11
Payer: MEDICARE

## 2024-09-13 DIAGNOSIS — F51.01 PRIMARY INSOMNIA: ICD-10-CM

## 2024-09-13 RX ORDER — TRAZODONE HYDROCHLORIDE 50 MG/1
100 TABLET ORAL NIGHTLY PRN
Qty: 60 TABLET | Refills: 2 | Status: SHIPPED | OUTPATIENT
Start: 2024-09-13

## 2024-09-13 NOTE — TELEPHONE ENCOUNTER
----- Message from Swetha Horton sent at 9/13/2024  9:23 AM CDT -----  [place] with home health called to get a refill for pt [facility].    [phone]

## 2024-09-14 LAB
AMPHETAMINES UR QL: NEGATIVE NG/ML
BARBITURATES UR QL: NEGATIVE NG/ML
BENZODIAZ UR QL: NEGATIVE NG/ML
BZE UR QL: NEGATIVE NG/ML
CODEINE UR-MCNC: NEGATIVE NG/ML
CREAT UR-MCNC: 88.4 MG/DL
DRUG SCREEN COMMENT UR-IMP: ABNORMAL
HYDROCODONE UR-MCNC: NEGATIVE NG/ML
HYDROMORPHONE UR-MCNC: NEGATIVE NG/ML
METHADONE UR QL: NEGATIVE NG/ML
MORPHINE UR-MCNC: NEGATIVE NG/ML
NORHYDROCODONE UR CFM-MCNC: 50 NG/ML
NOTES AND COMMENTS: ABNORMAL
OPIATES UR QL: POSITIVE NG/ML
OXIDANTS UR QL: NEGATIVE MCG/ML
OXYCODONE UR QL: NEGATIVE NG/ML
PCP UR QL: NEGATIVE NG/ML
PH UR: 6.4 [PH] (ref 4.5–9)

## 2024-09-30 ENCOUNTER — DOCUMENT SCAN (OUTPATIENT)
Dept: HOME HEALTH SERVICES | Facility: HOSPITAL | Age: 69
End: 2024-09-30
Payer: MEDICARE

## 2024-10-04 ENCOUNTER — DOCUMENT SCAN (OUTPATIENT)
Dept: HOME HEALTH SERVICES | Facility: HOSPITAL | Age: 69
End: 2024-10-04
Payer: MEDICARE

## 2024-10-05 NOTE — TELEPHONE ENCOUNTER
Pt is needing a refill on her bupropion. Last office visit 02/08/2023. Next office visit 05/08/2023.     New prescription for pt's bupropion on 11/08/2022 for 90 day supply and with 1 refill. Pt should be able to call her pharmacy for the refill.    Opt out

## 2024-10-10 ENCOUNTER — TELEPHONE (OUTPATIENT)
Dept: FAMILY MEDICINE | Facility: CLINIC | Age: 69
End: 2024-10-10
Payer: MEDICARE

## 2024-10-10 PROCEDURE — G0179 MD RECERTIFICATION HHA PT: HCPCS | Mod: ,,, | Performed by: FAMILY MEDICINE

## 2024-10-10 NOTE — TELEPHONE ENCOUNTER
----- Message from Roselyn sent at 10/10/2024  9:38 AM CDT -----  Refill for hydrocodone. [facility] [address]. Pt #[phone]

## 2024-10-11 NOTE — PLAN OF CARE
Problem: Adult Inpatient Plan of Care  Goal: Plan of Care Review  Outcome: Ongoing (interventions implemented as appropriate)  Pt stable post op. IV fluid and abx infusing per order. scd in place. dsg cdi. SYD to suction. Pure wick in place. AAO with VSS. Bed locked and low. Call light in reach.        Detail Level: Simple Continue Regimen: Salicylic acid overnight, advised to use bandage to cover (will help soften)

## 2024-10-15 ENCOUNTER — PATIENT MESSAGE (OUTPATIENT)
Dept: FAMILY MEDICINE | Facility: CLINIC | Age: 69
End: 2024-10-15
Payer: MEDICARE

## 2024-10-16 RX ORDER — DOXYCYCLINE 100 MG/1
100 CAPSULE ORAL EVERY 12 HOURS
Qty: 14 CAPSULE | Refills: 0 | Status: SHIPPED | OUTPATIENT
Start: 2024-10-16 | End: 2024-10-23

## 2024-10-16 RX ORDER — MUPIROCIN 20 MG/G
OINTMENT TOPICAL 2 TIMES DAILY
Qty: 22 G | Refills: 1 | Status: SHIPPED | OUTPATIENT
Start: 2024-10-16

## 2024-10-17 ENCOUNTER — PATIENT MESSAGE (OUTPATIENT)
Dept: FAMILY MEDICINE | Facility: CLINIC | Age: 69
End: 2024-10-17
Payer: MEDICARE

## 2024-10-21 ENCOUNTER — EXTERNAL HOME HEALTH (OUTPATIENT)
Dept: HOME HEALTH SERVICES | Facility: HOSPITAL | Age: 69
End: 2024-10-21
Payer: MEDICARE

## 2024-10-30 ENCOUNTER — OFFICE VISIT (OUTPATIENT)
Dept: FAMILY MEDICINE | Facility: CLINIC | Age: 69
End: 2024-10-30
Payer: MEDICARE

## 2024-10-30 VITALS
OXYGEN SATURATION: 98 % | DIASTOLIC BLOOD PRESSURE: 80 MMHG | HEIGHT: 63 IN | WEIGHT: 212 LBS | SYSTOLIC BLOOD PRESSURE: 130 MMHG | HEART RATE: 89 BPM | BODY MASS INDEX: 37.56 KG/M2

## 2024-10-30 DIAGNOSIS — D64.9 ANEMIA, UNSPECIFIED TYPE: ICD-10-CM

## 2024-10-30 DIAGNOSIS — I48.19 PERSISTENT ATRIAL FIBRILLATION: ICD-10-CM

## 2024-10-30 DIAGNOSIS — Z96.651 ARTIFICIAL KNEE JOINT PRESENT, RIGHT: ICD-10-CM

## 2024-10-30 DIAGNOSIS — I87.2 STASIS DERMATITIS OF BOTH LEGS: ICD-10-CM

## 2024-10-30 DIAGNOSIS — E78.5 DYSLIPIDEMIA: ICD-10-CM

## 2024-10-30 DIAGNOSIS — F51.01 PRIMARY INSOMNIA: ICD-10-CM

## 2024-10-30 DIAGNOSIS — R29.6 FALLS: Primary | ICD-10-CM

## 2024-10-30 DIAGNOSIS — I35.2 NONRHEUMATIC AORTIC VALVE STENOSIS WITH REGURGITATION: ICD-10-CM

## 2024-10-30 DIAGNOSIS — F33.0 MILD EPISODE OF RECURRENT MAJOR DEPRESSIVE DISORDER: ICD-10-CM

## 2024-10-30 DIAGNOSIS — I70.0 AORTIC ATHEROSCLEROSIS: ICD-10-CM

## 2024-10-30 DIAGNOSIS — I27.20 PULMONARY HYPERTENSION: ICD-10-CM

## 2024-10-30 DIAGNOSIS — N18.4 CKD (CHRONIC KIDNEY DISEASE) STAGE 4, GFR 15-29 ML/MIN: ICD-10-CM

## 2024-10-30 DIAGNOSIS — R19.7 DIARRHEA, UNSPECIFIED TYPE: ICD-10-CM

## 2024-10-30 DIAGNOSIS — I10 PRIMARY HYPERTENSION: ICD-10-CM

## 2024-10-30 DIAGNOSIS — M15.0 PRIMARY OSTEOARTHRITIS INVOLVING MULTIPLE JOINTS: ICD-10-CM

## 2024-10-30 DIAGNOSIS — I35.0 MODERATE AORTIC STENOSIS: ICD-10-CM

## 2024-10-30 DIAGNOSIS — J45.909 ASTHMA, UNSPECIFIED ASTHMA SEVERITY, UNSPECIFIED WHETHER COMPLICATED, UNSPECIFIED WHETHER PERSISTENT: ICD-10-CM

## 2024-10-30 DIAGNOSIS — I25.10 CORONARY ARTERY DISEASE INVOLVING NATIVE CORONARY ARTERY OF NATIVE HEART WITHOUT ANGINA PECTORIS: ICD-10-CM

## 2024-10-30 DIAGNOSIS — J44.1 CHRONIC OBSTRUCTIVE PULMONARY DISEASE WITH ACUTE EXACERBATION: ICD-10-CM

## 2024-10-30 DIAGNOSIS — I42.8 NONISCHEMIC CARDIOMYOPATHY: ICD-10-CM

## 2024-10-30 DIAGNOSIS — E78.2 MIXED HYPERLIPIDEMIA: ICD-10-CM

## 2024-10-30 PROCEDURE — 1100F PTFALLS ASSESS-DOCD GE2>/YR: CPT | Mod: CPTII,S$GLB,, | Performed by: FAMILY MEDICINE

## 2024-10-30 PROCEDURE — 3079F DIAST BP 80-89 MM HG: CPT | Mod: CPTII,S$GLB,, | Performed by: FAMILY MEDICINE

## 2024-10-30 PROCEDURE — 1159F MED LIST DOCD IN RCRD: CPT | Mod: CPTII,S$GLB,, | Performed by: FAMILY MEDICINE

## 2024-10-30 PROCEDURE — 3008F BODY MASS INDEX DOCD: CPT | Mod: CPTII,S$GLB,, | Performed by: FAMILY MEDICINE

## 2024-10-30 PROCEDURE — 3075F SYST BP GE 130 - 139MM HG: CPT | Mod: CPTII,S$GLB,, | Performed by: FAMILY MEDICINE

## 2024-10-30 PROCEDURE — 3060F POS MICROALBUMINURIA REV: CPT | Mod: CPTII,S$GLB,, | Performed by: FAMILY MEDICINE

## 2024-10-30 PROCEDURE — 1125F AMNT PAIN NOTED PAIN PRSNT: CPT | Mod: CPTII,S$GLB,, | Performed by: FAMILY MEDICINE

## 2024-10-30 PROCEDURE — 3066F NEPHROPATHY DOC TX: CPT | Mod: CPTII,S$GLB,, | Performed by: FAMILY MEDICINE

## 2024-10-30 PROCEDURE — 99214 OFFICE O/P EST MOD 30 MIN: CPT | Mod: S$GLB,,, | Performed by: FAMILY MEDICINE

## 2024-10-30 PROCEDURE — 3288F FALL RISK ASSESSMENT DOCD: CPT | Mod: CPTII,S$GLB,, | Performed by: FAMILY MEDICINE

## 2024-10-30 RX ORDER — DIPHENOXYLATE HYDROCHLORIDE AND ATROPINE SULFATE 2.5; .025 MG/1; MG/1
1 TABLET ORAL 4 TIMES DAILY PRN
Qty: 40 TABLET | Refills: 1 | Status: SHIPPED | OUTPATIENT
Start: 2024-10-30 | End: 2024-11-09

## 2024-10-30 RX ORDER — MIRTAZAPINE 15 MG/1
15 TABLET, FILM COATED ORAL NIGHTLY
Qty: 30 TABLET | Refills: 3 | Status: SHIPPED | OUTPATIENT
Start: 2024-10-30 | End: 2025-10-30

## 2024-10-30 RX ORDER — TRIAMCINOLONE ACETONIDE 1 MG/G
CREAM TOPICAL 2 TIMES DAILY
Qty: 60 G | Refills: 2 | Status: SHIPPED | OUTPATIENT
Start: 2024-10-30

## 2024-11-02 PROBLEM — N18.4 CKD (CHRONIC KIDNEY DISEASE) STAGE 4, GFR 15-29 ML/MIN: Status: ACTIVE | Noted: 2019-09-13

## 2024-11-05 ENCOUNTER — DOCUMENT SCAN (OUTPATIENT)
Dept: HOME HEALTH SERVICES | Facility: HOSPITAL | Age: 69
End: 2024-11-05
Payer: MEDICARE

## 2024-11-15 NOTE — TELEPHONE ENCOUNTER
----- Message from Maricel Boone sent at 11/19/2019  3:28 PM CST -----  Contact: Lashonda gamez/ [employer]  Lashonda is calling she would like to know if dr. You will follow up with home health orders for the patients. Says to call and give her a verbal or fax the orders over  Tel# [phone] and Fax# [phone]   Patient is a 77-year-old male with past medical his after dinner, hyperlipidemia, diabetes who presents emergency department complaining of abnormal EKG.  Patient states that yesterday he was watching right wrist playing that radiated to the chest.  Patient states he took baby aspirin and went to sleep.  Patient states he woke up this morning and went to his primary care physician where he had an EKG.  Patient states the EKG showed concern for abnormal T waves.  Patient was sent in for further evaluation in the emergency department.  At this time, patient denies any chest pain, shortness of breath, palpitations, lower extremity swelling, sick contacts.  No recent travel.  Patient denies any cardiac history.

## 2024-11-18 ENCOUNTER — TELEPHONE (OUTPATIENT)
Dept: FAMILY MEDICINE | Facility: CLINIC | Age: 69
End: 2024-11-18
Payer: MEDICARE

## 2024-11-18 NOTE — TELEPHONE ENCOUNTER
----- Message from Med Assistant Jain sent at 11/18/2024  8:42 AM CST -----  PT Damir is calling stating he visited patient this morning, found patient sitting on the floor.  Patient unsure if she hit or head or not.  Instructed patient to report to ER for evaluation due to patient taking blood thinners.  Damir calling to give JO-ANN Thomson with [facility]: [phone]

## 2024-11-26 ENCOUNTER — DOCUMENT SCAN (OUTPATIENT)
Dept: HOME HEALTH SERVICES | Facility: HOSPITAL | Age: 69
End: 2024-11-26
Payer: MEDICARE

## 2024-12-02 DIAGNOSIS — I48.0 PAROXYSMAL ATRIAL FIBRILLATION: ICD-10-CM

## 2024-12-05 ENCOUNTER — DOCUMENT SCAN (OUTPATIENT)
Dept: HOME HEALTH SERVICES | Facility: HOSPITAL | Age: 69
End: 2024-12-05
Payer: MEDICARE

## 2024-12-07 ENCOUNTER — HOSPITAL ENCOUNTER (INPATIENT)
Facility: HOSPITAL | Age: 69
LOS: 9 days | Discharge: NURSING, REHAB OR LONG-TERM CARE | DRG: 305 | End: 2024-12-17
Attending: STUDENT IN AN ORGANIZED HEALTH CARE EDUCATION/TRAINING PROGRAM | Admitting: INTERNAL MEDICINE
Payer: MEDICARE

## 2024-12-07 DIAGNOSIS — I16.1 HYPERTENSIVE EMERGENCY: ICD-10-CM

## 2024-12-07 DIAGNOSIS — M25.512 LEFT SHOULDER PAIN: ICD-10-CM

## 2024-12-07 DIAGNOSIS — I50.42 CHRONIC COMBINED SYSTOLIC AND DIASTOLIC HEART FAILURE: ICD-10-CM

## 2024-12-07 DIAGNOSIS — M79.602 LEFT ARM PAIN: ICD-10-CM

## 2024-12-07 DIAGNOSIS — S01.81XD LACERATION OF FOREHEAD, SUBSEQUENT ENCOUNTER: ICD-10-CM

## 2024-12-07 DIAGNOSIS — M25.559 HIP PAIN: ICD-10-CM
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
## 2024-12-07 DIAGNOSIS — F17.200 TOBACCO DEPENDENCY: Primary | ICD-10-CM

## 2024-12-07 DIAGNOSIS — R07.9 CHEST PAIN: ICD-10-CM

## 2024-12-07 DIAGNOSIS — M17.0 PRIMARY OSTEOARTHRITIS OF BOTH KNEES: ICD-10-CM

## 2024-12-07 DIAGNOSIS — I63.9 CVA (CEREBRAL VASCULAR ACCIDENT): ICD-10-CM

## 2024-12-07 DIAGNOSIS — E78.5 DYSLIPIDEMIA: ICD-10-CM

## 2024-12-07 DIAGNOSIS — I48.19 PERSISTENT ATRIAL FIBRILLATION: ICD-10-CM

## 2024-12-07 DIAGNOSIS — R29.818 ACUTE FOCAL NEUROLOGICAL DEFICIT: ICD-10-CM

## 2024-12-07 LAB
ALBUMIN SERPL BCP-MCNC: 3.7 G/DL (ref 3.5–5.2)
ALP SERPL-CCNC: 141 U/L (ref 55–135)
ALT SERPL W/O P-5'-P-CCNC: 14 U/L (ref 10–44)
ANION GAP SERPL CALC-SCNC: 7 MMOL/L (ref 8–16)
AST SERPL-CCNC: 13 U/L (ref 10–40)
BASOPHILS # BLD AUTO: 0.03 K/UL (ref 0–0.2)
BASOPHILS NFR BLD: 0.4 % (ref 0–1.9)
BILIRUB SERPL-MCNC: 0.8 MG/DL (ref 0.1–1)
BUN SERPL-MCNC: 24 MG/DL (ref 8–23)
CALCIUM SERPL-MCNC: 8.6 MG/DL (ref 8.7–10.5)
CHLORIDE SERPL-SCNC: 104 MMOL/L (ref 95–110)
CHOLEST SERPL-MCNC: 100 MG/DL (ref 120–199)
CHOLEST/HDLC SERPL: 2.6 {RATIO} (ref 2–5)
CO2 SERPL-SCNC: 24 MMOL/L (ref 23–29)
CREAT SERPL-MCNC: 2.2 MG/DL (ref 0.5–1.4)
CREAT SERPL-MCNC: 2.2 MG/DL (ref 0.5–1.4)
DIFFERENTIAL METHOD BLD: ABNORMAL
EOSINOPHIL # BLD AUTO: 0.1 K/UL (ref 0–0.5)
EOSINOPHIL NFR BLD: 1.8 % (ref 0–8)
ERYTHROCYTE [DISTWIDTH] IN BLOOD BY AUTOMATED COUNT: 18.6 % (ref 11.5–14.5)
EST. GFR  (NO RACE VARIABLE): 23.4 ML/MIN/1.73 M^2
GLUCOSE SERPL-MCNC: 90 MG/DL (ref 70–110)
HCT VFR BLD AUTO: 31 % (ref 37–48.5)
HDLC SERPL-MCNC: 38 MG/DL (ref 40–75)
HDLC SERPL: 38 % (ref 20–50)
HGB BLD-MCNC: 8.9 G/DL (ref 12–16)
IMM GRANULOCYTES # BLD AUTO: 0.05 K/UL (ref 0–0.04)
IMM GRANULOCYTES NFR BLD AUTO: 0.7 % (ref 0–0.5)
INR PPP: 1.1 (ref 0.8–1.2)
LDLC SERPL CALC-MCNC: 44 MG/DL (ref 63–159)
LYMPHOCYTES # BLD AUTO: 0.6 K/UL (ref 1–4.8)
LYMPHOCYTES NFR BLD: 8.5 % (ref 18–48)
MCH RBC QN AUTO: 26.4 PG (ref 27–31)
MCHC RBC AUTO-ENTMCNC: 28.7 G/DL (ref 32–36)
MCV RBC AUTO: 92 FL (ref 82–98)
MONOCYTES # BLD AUTO: 0.5 K/UL (ref 0.3–1)
MONOCYTES NFR BLD: 7.1 % (ref 4–15)
NEUTROPHILS # BLD AUTO: 6 K/UL (ref 1.8–7.7)
NEUTROPHILS NFR BLD: 81.5 % (ref 38–73)
NONHDLC SERPL-MCNC: 62 MG/DL
NRBC BLD-RTO: 0 /100 WBC
PLATELET # BLD AUTO: 259 K/UL (ref 150–450)
PMV BLD AUTO: 9.5 FL (ref 9.2–12.9)
POCT GLUCOSE: 98 MG/DL (ref 70–110)
POTASSIUM SERPL-SCNC: 4.1 MMOL/L (ref 3.5–5.1)
PROT SERPL-MCNC: 6.5 G/DL (ref 6–8.4)
PROTHROMBIN TIME: 12.3 SEC (ref 9–12.5)
RBC # BLD AUTO: 3.37 M/UL (ref 4–5.4)
SAMPLE: ABNORMAL
SODIUM SERPL-SCNC: 135 MMOL/L (ref 136–145)
TRIGL SERPL-MCNC: 90 MG/DL (ref 30–150)
TROPONIN I SERPL HS-MCNC: 12.1 PG/ML (ref 0–14.9)
TSH SERPL DL<=0.005 MIU/L-ACNC: 1.81 UIU/ML (ref 0.34–5.6)
WBC # BLD AUTO: 7.3 K/UL (ref 3.9–12.7)

## 2024-12-07 PROCEDURE — 63600175 PHARM REV CODE 636 W HCPCS: Performed by: STUDENT IN AN ORGANIZED HEALTH CARE EDUCATION/TRAINING PROGRAM

## 2024-12-07 PROCEDURE — 84443 ASSAY THYROID STIM HORMONE: CPT | Performed by: NURSE PRACTITIONER

## 2024-12-07 PROCEDURE — 36415 COLL VENOUS BLD VENIPUNCTURE: CPT | Performed by: NURSE PRACTITIONER

## 2024-12-07 PROCEDURE — 80061 LIPID PANEL: CPT | Performed by: NURSE PRACTITIONER

## 2024-12-07 PROCEDURE — 94761 N-INVAS EAR/PLS OXIMETRY MLT: CPT

## 2024-12-07 PROCEDURE — 80053 COMPREHEN METABOLIC PANEL: CPT | Performed by: NURSE PRACTITIONER

## 2024-12-07 PROCEDURE — 93005 ELECTROCARDIOGRAM TRACING: CPT | Performed by: GENERAL PRACTICE

## 2024-12-07 PROCEDURE — 85025 COMPLETE CBC W/AUTO DIFF WBC: CPT | Performed by: NURSE PRACTITIONER

## 2024-12-07 PROCEDURE — G0426 INPT/ED TELECONSULT50: HCPCS | Mod: 95,,, | Performed by: PSYCHIATRY & NEUROLOGY

## 2024-12-07 PROCEDURE — 93010 ELECTROCARDIOGRAM REPORT: CPT | Mod: ,,, | Performed by: GENERAL PRACTICE

## 2024-12-07 PROCEDURE — 27000221 HC OXYGEN, UP TO 24 HOURS

## 2024-12-07 PROCEDURE — 85610 PROTHROMBIN TIME: CPT | Performed by: NURSE PRACTITIONER

## 2024-12-07 PROCEDURE — 84484 ASSAY OF TROPONIN QUANT: CPT | Performed by: NURSE PRACTITIONER

## 2024-12-07 RX ORDER — MORPHINE SULFATE 2 MG/ML
INJECTION, SOLUTION INTRAMUSCULAR; INTRAVENOUS
Status: DISPENSED
Start: 2024-12-07 | End: 2024-12-08

## 2024-12-07 RX ORDER — MORPHINE SULFATE 2 MG/ML
2 INJECTION, SOLUTION INTRAMUSCULAR; INTRAVENOUS
Status: COMPLETED | OUTPATIENT
Start: 2024-12-07 | End: 2024-12-07

## 2024-12-07 RX ADMIN — MORPHINE SULFATE 2 MG: 2 INJECTION, SOLUTION INTRAMUSCULAR; INTRAVENOUS at 09:12

## 2024-12-07 NOTE — LETTER
Fax Transmission                                                                                                                                                       Date: December 16, 2024       To:  PHN-Intake From: MINDY Barkley   Fax:   Fax:    Phone:   Phone: [phone]     Special Instructions:     Requesting authorization for SNF, Skilled Nursing.                        Confidentiality notice     The documents accompanying this transmission may contain confidential health information that is legally privileged. This information is intended only for the use of the individual or entity named above. The authorized recipient of this information is prohibited from disclosing this information to any other party unless required to do so by law or regulation and is required to destroy the information after its stated need has been fulfilled.   If you are not the intended recipient, you are hereby notified that any disclosure, copying, distribution, or action taken in reliance on the contents of these documents is strictly prohibited. If you have received this information in error, please notify the sender immediately or [facility]  by phone at [phone] and arrange for the return or destruction of these documents.

## 2024-12-07 NOTE — Clinical Note
Diagnosis: Acute focal neurological deficit [322444]   Admit to which facility:: [facility] [[id number]]   Reason for IP Medical Treatment  (Clinical interventions that can only be accomplished in the IP setting? ) :: CVA

## 2024-12-08 ENCOUNTER — CLINICAL SUPPORT (OUTPATIENT)
Dept: CARDIOLOGY | Facility: HOSPITAL | Age: 69
End: 2024-12-08
Attending: INTERNAL MEDICINE
Payer: MEDICARE

## 2024-12-08 VITALS — BODY MASS INDEX: 41.41 KG/M2 | WEIGHT: 233.69 LBS | HEIGHT: 63 IN

## 2024-12-08 PROBLEM — I63.9 CVA (CEREBRAL VASCULAR ACCIDENT): Status: ACTIVE | Noted: 2024-12-08

## 2024-12-08 PROBLEM — F17.200 TOBACCO DEPENDENCY: Status: ACTIVE | Noted: 2024-12-08

## 2024-12-08 PROBLEM — R41.82 AMS (ALTERED MENTAL STATUS): Status: ACTIVE | Noted: 2024-12-08

## 2024-12-08 LAB
ALBUMIN SERPL BCP-MCNC: 3.4 G/DL (ref 3.5–5.2)
ALP SERPL-CCNC: 124 U/L (ref 55–135)
ALT SERPL W/O P-5'-P-CCNC: 10 U/L (ref 10–44)
AMMONIA PLAS-SCNC: 38 UMOL/L (ref 10–50)
AMPHET+METHAMPHET UR QL: NEGATIVE
ANION GAP SERPL CALC-SCNC: 7 MMOL/L (ref 8–16)
AORTIC ROOT ANNULUS: 3.2 CM
AORTIC VALVE CUSP SEPERATION: 0.9 CM
APICAL FOUR CHAMBER EJECTION FRACTION: 42 %
APICAL TWO CHAMBER EJECTION FRACTION: 69 %
ASCENDING AORTA: 3.6 CM
AST SERPL-CCNC: 10 U/L (ref 10–40)
AV INDEX (PROSTH): 0.32
AV MEAN GRADIENT: 27 MMHG
AV PEAK GRADIENT: 46.2 MMHG
AV REGURGITATION PRESSURE HALF TIME: 534 MS
AV VALVE AREA BY VELOCITY RATIO: 1 CM²
AV VALVE AREA: 1 CM²
AV VELOCITY RATIO: 0.32
BACTERIA #/AREA URNS HPF: ABNORMAL /HPF
BARBITURATES UR QL SCN>200 NG/ML: NEGATIVE
BASOPHILS # BLD AUTO: 0.03 K/UL (ref 0–0.2)
BASOPHILS NFR BLD: 0.5 % (ref 0–1.9)
BENZODIAZ UR QL SCN>200 NG/ML: NEGATIVE
BILIRUB SERPL-MCNC: 0.7 MG/DL (ref 0.1–1)
BILIRUB UR QL STRIP: NEGATIVE
BSA FOR ECHO PROCEDURE: 2.17 M2
BUN SERPL-MCNC: 23 MG/DL (ref 8–23)
BZE UR QL SCN: NEGATIVE
CALCIUM SERPL-MCNC: 8.3 MG/DL (ref 8.7–10.5)
CANNABINOIDS UR QL SCN: NEGATIVE
CHLORIDE SERPL-SCNC: 105 MMOL/L (ref 95–110)
CLARITY UR: ABNORMAL
CO2 SERPL-SCNC: 23 MMOL/L (ref 23–29)
COLOR UR: YELLOW
CREAT SERPL-MCNC: 2.1 MG/DL (ref 0.5–1.4)
CREAT UR-MCNC: 147.2 MG/DL (ref 15–325)
CV ECHO LV RWT: 0.52 CM
DIFFERENTIAL METHOD BLD: ABNORMAL
DOP CALC AO PEAK VEL: 3.4 M/S
DOP CALC AO VTI: 64.4 CM
DOP CALC LVOT AREA: 3.1 CM2
DOP CALC LVOT DIAMETER: 2 CM
DOP CALC LVOT PEAK VEL: 1.1 M/S
DOP CALC LVOT STROKE VOLUME: 64.4 CM3
DOP CALC MV VTI: 28.4 CM
DOP CALCLVOT PEAK VEL VTI: 20.5 CM
E WAVE DECELERATION TIME: 229 MSEC
E/E' RATIO: 13 M/S
ECHO LV POSTERIOR WALL: 1.2 CM (ref 0.6–1.1)
EOSINOPHIL # BLD AUTO: 0 K/UL (ref 0–0.5)
EOSINOPHIL NFR BLD: 0.7 % (ref 0–8)
ERYTHROCYTE [DISTWIDTH] IN BLOOD BY AUTOMATED COUNT: 18.7 % (ref 11.5–14.5)
EST. GFR  (NO RACE VARIABLE): 24.7 ML/MIN/1.73 M^2
ESTIMATED AVG GLUCOSE: 105 MG/DL (ref 68–131)
FRACTIONAL SHORTENING: 19.6 % (ref 28–44)
GLUCOSE SERPL-MCNC: 89 MG/DL (ref 70–110)
GLUCOSE UR QL STRIP: NEGATIVE
HBA1C MFR BLD: 5.3 % (ref 4.5–6.2)
HCT VFR BLD AUTO: 31.6 % (ref 37–48.5)
HGB BLD-MCNC: 8.7 G/DL (ref 12–16)
HGB UR QL STRIP: NEGATIVE
HYALINE CASTS #/AREA URNS LPF: 13 /LPF
IMM GRANULOCYTES # BLD AUTO: 0.02 K/UL (ref 0–0.04)
IMM GRANULOCYTES NFR BLD AUTO: 0.3 % (ref 0–0.5)
INTERVENTRICULAR SEPTUM: 1.6 CM (ref 0.6–1.1)
IVC DIAMETER: 2.6 CM
KETONES UR QL STRIP: NEGATIVE
LEFT ATRIUM AREA SYSTOLIC (APICAL 4 CHAMBER): 32.6 CM2
LEFT ATRIUM SIZE: 4.8 CM
LEFT INTERNAL DIMENSION IN SYSTOLE: 3.7 CM (ref 2.1–4)
LEFT VENTRICLE DIASTOLIC VOLUME INDEX: 47.02 ML/M2
LEFT VENTRICLE DIASTOLIC VOLUME: 97.34 ML
LEFT VENTRICLE END DIASTOLIC VOLUME APICAL 2 CHAMBER: 108 ML
LEFT VENTRICLE END DIASTOLIC VOLUME APICAL 4 CHAMBER: 78.4 ML
LEFT VENTRICLE END SYSTOLIC VOLUME APICAL 4 CHAMBER: 117 ML
LEFT VENTRICLE MASS INDEX: 124 G/M2
LEFT VENTRICLE SYSTOLIC VOLUME INDEX: 28.1 ML/M2
LEFT VENTRICLE SYSTOLIC VOLUME: 58.13 ML
LEFT VENTRICULAR INTERNAL DIMENSION IN DIASTOLE: 4.6 CM (ref 3.5–6)
LEFT VENTRICULAR MASS: 256.8 G
LEUKOCYTE ESTERASE UR QL STRIP: ABNORMAL
LV LATERAL E/E' RATIO: 11.82 M/S
LV SEPTAL E/E' RATIO: 14.44 M/S
LVED V (TEICH): 97.34 ML
LVES V (TEICH): 58.13 ML
LVOT MG: 3 MMHG
LVOT MV: 0.71 CM/S
LYMPHOCYTES # BLD AUTO: 0.7 K/UL (ref 1–4.8)
LYMPHOCYTES NFR BLD: 10.8 % (ref 18–48)
MAGNESIUM SERPL-MCNC: 1.7 MG/DL (ref 1.6–2.6)
MCH RBC QN AUTO: 25.7 PG (ref 27–31)
MCHC RBC AUTO-ENTMCNC: 27.5 G/DL (ref 32–36)
MCV RBC AUTO: 94 FL (ref 82–98)
MICROSCOPIC COMMENT: ABNORMAL
MONOCYTES # BLD AUTO: 0.6 K/UL (ref 0.3–1)
MONOCYTES NFR BLD: 9.6 % (ref 4–15)
MV MEAN GRADIENT: 2 MMHG
MV PEAK E VEL: 1.3 M/S
MV PEAK GRADIENT: 4 MMHG
MV STENOSIS PRESSURE HALF TIME: 91 MS
MV VALVE AREA BY CONTINUITY EQUATION: 2.27 CM2
MV VALVE AREA P 1/2 METHOD: 2.42 CM2
NEUTROPHILS # BLD AUTO: 4.7 K/UL (ref 1.8–7.7)
NEUTROPHILS NFR BLD: 78.1 % (ref 38–73)
NITRITE UR QL STRIP: NEGATIVE
NRBC BLD-RTO: 0 /100 WBC
OHS LV EJECTION FRACTION SIMPSONS BIPLANE MOD: 58 %
OHS QRS DURATION: 112 MS
OHS QTC CALCULATION: 490 MS
OPIATES UR QL SCN: ABNORMAL
PCP UR QL SCN>25 NG/ML: NEGATIVE
PH UR STRIP: 6 [PH] (ref 5–8)
PISA AR MAX VEL: 4.59 M/S
PISA TR MAX VEL: 2.57 M/S
PLATELET # BLD AUTO: 216 K/UL (ref 150–450)
PMV BLD AUTO: 8.9 FL (ref 9.2–12.9)
POTASSIUM SERPL-SCNC: 4.2 MMOL/L (ref 3.5–5.1)
PROT SERPL-MCNC: 6 G/DL (ref 6–8.4)
PROT UR QL STRIP: ABNORMAL
PV MV: 0.59 M/S
PV PEAK GRADIENT: 3 MMHG
PV PEAK VELOCITY: 0.87 M/S
RA PRESSURE ESTIMATED: 15 MMHG
RBC # BLD AUTO: 3.38 M/UL (ref 4–5.4)
RBC #/AREA URNS HPF: 0 /HPF (ref 0–4)
RIGHT ATRIUM VOLUME AREA LENGTH APICAL 4 CHAMBER: 83.8 ML
RIGHT VENTRICULAR END-DIASTOLIC DIMENSION: 2.2 CM
RV TB RVSP: 18 MMHG
RV TISSUE DOPPLER FREE WALL SYSTOLIC VELOCITY 1 (APICAL 4 CHAMBER VIEW): 9.36 CM/S
SODIUM SERPL-SCNC: 135 MMOL/L (ref 136–145)
SP GR UR STRIP: 1.02 (ref 1–1.03)
SQUAMOUS #/AREA URNS HPF: 2 /HPF
TDI LATERAL: 0.11 M/S
TDI SEPTAL: 0.09 M/S
TDI: 0.1 M/S
TOXICOLOGY INFORMATION: ABNORMAL
TR MAX PG: 26 MMHG
TV REST PULMONARY ARTERY PRESSURE: 41 MMHG
URN SPEC COLLECT METH UR: ABNORMAL
UROBILINOGEN UR STRIP-ACNC: NEGATIVE EU/DL
WBC # BLD AUTO: 6.02 K/UL (ref 3.9–12.7)
WBC #/AREA URNS HPF: 14 /HPF (ref 0–5)
Z-SCORE OF LEFT VENTRICULAR DIMENSION IN END DIASTOLE: -3.13
Z-SCORE OF LEFT VENTRICULAR DIMENSION IN END SYSTOLE: -0.32

## 2024-12-08 PROCEDURE — S4991 NICOTINE PATCH NONLEGEND: HCPCS | Performed by: INTERNAL MEDICINE

## 2024-12-08 PROCEDURE — 25000003 PHARM REV CODE 250: Performed by: INTERNAL MEDICINE

## 2024-12-08 PROCEDURE — 87086 URINE CULTURE/COLONY COUNT: CPT | Performed by: STUDENT IN AN ORGANIZED HEALTH CARE EDUCATION/TRAINING PROGRAM

## 2024-12-08 PROCEDURE — 85025 COMPLETE CBC W/AUTO DIFF WBC: CPT | Performed by: INTERNAL MEDICINE

## 2024-12-08 PROCEDURE — 93306 TTE W/DOPPLER COMPLETE: CPT

## 2024-12-08 PROCEDURE — 97110 THERAPEUTIC EXERCISES: CPT

## 2024-12-08 PROCEDURE — 80307 DRUG TEST PRSMV CHEM ANLYZR: CPT | Performed by: INTERNAL MEDICINE

## 2024-12-08 PROCEDURE — 99900035 HC TECH TIME PER 15 MIN (STAT)

## 2024-12-08 PROCEDURE — 25000242 PHARM REV CODE 250 ALT 637 W/ HCPCS: Performed by: INTERNAL MEDICINE

## 2024-12-08 PROCEDURE — 90471 IMMUNIZATION ADMIN: CPT | Performed by: STUDENT IN AN ORGANIZED HEALTH CARE EDUCATION/TRAINING PROGRAM

## 2024-12-08 PROCEDURE — 5A0935A ASSISTANCE WITH RESPIRATORY VENTILATION, LESS THAN 24 CONSECUTIVE HOURS, HIGH NASAL FLOW/VELOCITY: ICD-10-PCS | Performed by: INTERNAL MEDICINE

## 2024-12-08 PROCEDURE — 92610 EVALUATE SWALLOWING FUNCTION: CPT

## 2024-12-08 PROCEDURE — 97162 PT EVAL MOD COMPLEX 30 MIN: CPT

## 2024-12-08 PROCEDURE — 92523 SPEECH SOUND LANG COMPREHEN: CPT

## 2024-12-08 PROCEDURE — 81001 URINALYSIS AUTO W/SCOPE: CPT | Performed by: STUDENT IN AN ORGANIZED HEALTH CARE EDUCATION/TRAINING PROGRAM

## 2024-12-08 PROCEDURE — 94761 N-INVAS EAR/PLS OXIMETRY MLT: CPT

## 2024-12-08 PROCEDURE — 63600175 PHARM REV CODE 636 W HCPCS: Performed by: STUDENT IN AN ORGANIZED HEALTH CARE EDUCATION/TRAINING PROGRAM

## 2024-12-08 PROCEDURE — 83036 HEMOGLOBIN GLYCOSYLATED A1C: CPT | Performed by: INTERNAL MEDICINE

## 2024-12-08 PROCEDURE — 25000003 PHARM REV CODE 250: Performed by: STUDENT IN AN ORGANIZED HEALTH CARE EDUCATION/TRAINING PROGRAM

## 2024-12-08 PROCEDURE — 94799 UNLISTED PULMONARY SVC/PX: CPT

## 2024-12-08 PROCEDURE — 93306 TTE W/DOPPLER COMPLETE: CPT | Mod: 26,,, | Performed by: INTERNAL MEDICINE

## 2024-12-08 PROCEDURE — 97535 SELF CARE MNGMENT TRAINING: CPT

## 2024-12-08 PROCEDURE — 97166 OT EVAL MOD COMPLEX 45 MIN: CPT

## 2024-12-08 PROCEDURE — 82140 ASSAY OF AMMONIA: CPT | Performed by: INTERNAL MEDICINE

## 2024-12-08 PROCEDURE — 83735 ASSAY OF MAGNESIUM: CPT | Performed by: INTERNAL MEDICINE

## 2024-12-08 PROCEDURE — 3E0234Z INTRODUCTION OF SERUM, TOXOID AND VACCINE INTO MUSCLE, PERCUTANEOUS APPROACH: ICD-10-PCS | Performed by: INTERNAL MEDICINE

## 2024-12-08 PROCEDURE — 36415 COLL VENOUS BLD VENIPUNCTURE: CPT | Performed by: INTERNAL MEDICINE

## 2024-12-08 PROCEDURE — 80053 COMPREHEN METABOLIC PANEL: CPT | Performed by: INTERNAL MEDICINE

## 2024-12-08 PROCEDURE — 90715 TDAP VACCINE 7 YRS/> IM: CPT | Performed by: STUDENT IN AN ORGANIZED HEALTH CARE EDUCATION/TRAINING PROGRAM

## 2024-12-08 PROCEDURE — 87186 SC STD MICRODIL/AGAR DIL: CPT | Performed by: STUDENT IN AN ORGANIZED HEALTH CARE EDUCATION/TRAINING PROGRAM

## 2024-12-08 PROCEDURE — 21400001 HC TELEMETRY ROOM

## 2024-12-08 PROCEDURE — 94640 AIRWAY INHALATION TREATMENT: CPT

## 2024-12-08 PROCEDURE — 99900031 HC PATIENT EDUCATION (STAT)

## 2024-12-08 PROCEDURE — 27000221 HC OXYGEN, UP TO 24 HOURS

## 2024-12-08 PROCEDURE — 99406 BEHAV CHNG SMOKING 3-10 MIN: CPT

## 2024-12-08 RX ORDER — DOXYCYCLINE 100 MG/1
100 CAPSULE ORAL EVERY 12 HOURS
Status: DISCONTINUED | OUTPATIENT
Start: 2024-12-08 | End: 2024-12-17 | Stop reason: HOSPADM

## 2024-12-08 RX ORDER — MIRTAZAPINE 7.5 MG/1
15 TABLET, FILM COATED ORAL NIGHTLY
Status: DISCONTINUED | OUTPATIENT
Start: 2024-12-09 | End: 2024-12-17 | Stop reason: HOSPADM

## 2024-12-08 RX ORDER — ATORVASTATIN CALCIUM 40 MG/1
40 TABLET, FILM COATED ORAL NIGHTLY
Status: DISCONTINUED | OUTPATIENT
Start: 2024-12-08 | End: 2024-12-17 | Stop reason: HOSPADM

## 2024-12-08 RX ORDER — ATORVASTATIN CALCIUM 10 MG/1
10 TABLET, FILM COATED ORAL NIGHTLY
Status: DISCONTINUED | OUTPATIENT
Start: 2024-12-08 | End: 2024-12-08

## 2024-12-08 RX ORDER — HYDROCODONE BITARTRATE AND ACETAMINOPHEN 5; 325 MG/1; MG/1
1 TABLET ORAL EVERY 6 HOURS PRN
Status: DISCONTINUED | OUTPATIENT
Start: 2024-12-08 | End: 2024-12-17 | Stop reason: HOSPADM

## 2024-12-08 RX ORDER — NAPROXEN SODIUM 220 MG/1
81 TABLET, FILM COATED ORAL DAILY
Status: DISCONTINUED | OUTPATIENT
Start: 2024-12-08 | End: 2024-12-17 | Stop reason: HOSPADM

## 2024-12-08 RX ORDER — HYDRALAZINE HYDROCHLORIDE 20 MG/ML
10 INJECTION INTRAMUSCULAR; INTRAVENOUS EVERY 4 HOURS PRN
Status: DISCONTINUED | OUTPATIENT
Start: 2024-12-08 | End: 2024-12-17 | Stop reason: HOSPADM

## 2024-12-08 RX ORDER — IPRATROPIUM BROMIDE AND ALBUTEROL SULFATE 2.5; .5 MG/3ML; MG/3ML
3 SOLUTION RESPIRATORY (INHALATION) EVERY 4 HOURS
Status: DISCONTINUED | OUTPATIENT
Start: 2024-12-08 | End: 2024-12-14

## 2024-12-08 RX ORDER — AMIODARONE HYDROCHLORIDE 200 MG/1
200 TABLET ORAL DAILY
Status: DISCONTINUED | OUTPATIENT
Start: 2024-12-08 | End: 2024-12-17 | Stop reason: HOSPADM

## 2024-12-08 RX ORDER — ONDANSETRON HYDROCHLORIDE 2 MG/ML
4 INJECTION, SOLUTION INTRAVENOUS EVERY 6 HOURS PRN
Status: DISCONTINUED | OUTPATIENT
Start: 2024-12-08 | End: 2024-12-17 | Stop reason: HOSPADM

## 2024-12-08 RX ORDER — IBUPROFEN 200 MG
1 TABLET ORAL DAILY
Status: DISCONTINUED | OUTPATIENT
Start: 2024-12-08 | End: 2024-12-14

## 2024-12-08 RX ADMIN — SODIUM CHLORIDE 250 ML: 9 INJECTION, SOLUTION INTRAVENOUS at 12:12

## 2024-12-08 RX ADMIN — NICOTINE 1 PATCH: 14 PATCH, EXTENDED RELEASE TRANSDERMAL at 08:12

## 2024-12-08 RX ADMIN — IPRATROPIUM BROMIDE AND ALBUTEROL SULFATE 3 ML: .5; 3 SOLUTION RESPIRATORY (INHALATION) at 04:12

## 2024-12-08 RX ADMIN — ATORVASTATIN CALCIUM 40 MG: 40 TABLET, FILM COATED ORAL at 08:12

## 2024-12-08 RX ADMIN — CLOSTRIDIUM TETANI TOXOID ANTIGEN (FORMALDEHYDE INACTIVATED), CORYNEBACTERIUM DIPHTHERIAE TOXOID ANTIGEN (FORMALDEHYDE INACTIVATED), BORDETELLA PERTUSSIS TOXOID ANTIGEN (GLUTARALDEHYDE INACTIVATED), BORDETELLA PERTUSSIS FILAMENTOUS HEMAGGLUTININ ANTIGEN (FORMALDEHYDE INACTIVATED), BORDETELLA PERTUSSIS PERTACTIN ANTIGEN, AND BORDETELLA PERTUSSIS FIMBRIAE 2/3 ANTIGEN 0.5 ML: 5; 2; 2.5; 5; 3; 5 INJECTION, SUSPENSION INTRAMUSCULAR at 12:12

## 2024-12-08 RX ADMIN — DOXYCYCLINE HYCLATE 100 MG: 100 CAPSULE ORAL at 08:12

## 2024-12-08 RX ADMIN — HYDROCODONE BITARTRATE AND ACETAMINOPHEN 1 TABLET: 5; 325 TABLET ORAL at 12:12

## 2024-12-08 RX ADMIN — AMIODARONE HYDROCHLORIDE 200 MG: 200 TABLET ORAL at 08:12

## 2024-12-08 RX ADMIN — IPRATROPIUM BROMIDE AND ALBUTEROL SULFATE 3 ML: .5; 3 SOLUTION RESPIRATORY (INHALATION) at 07:12

## 2024-12-08 RX ADMIN — IPRATROPIUM BROMIDE AND ALBUTEROL SULFATE 3 ML: .5; 3 SOLUTION RESPIRATORY (INHALATION) at 12:12

## 2024-12-08 RX ADMIN — ASPIRIN 81 MG 81 MG: 81 TABLET ORAL at 04:12

## 2024-12-08 NOTE — PLAN OF CARE
[facility]  Initial Discharge Assessment       Primary Care Provider: Elkin You MD    Admission Diagnosis: Acute focal neurological deficit [R29.818]    Admission Date: 12/7/2024  Expected Discharge Date: Unknown   Pt is a 69-year-old female who arrived from home with CVA problem. Information verified as correct on facesheet. The assessment was completed at the patient's bedside with her niece Shonna Carey--[phone].  Pt lives with her spouse (Enrico--he is very hard of hearing). Pt does not have a Living Will or Advance Directive. The Pt is oriented to person, places, and times. PCP last seen 10/30/2024.  Pt denies Coumadin, dialysis but she does take Eliquis. Pt DME at home is W/C, Rollater Walker and Shower chair. Per niece pt has HH but she does not know who or what the name is,  and she said the Pt has not been readmitted into the hospital in the last thirty days.  Pt is not capable of performing ADLs without assistance. Pt family drives her to and from medical appointments. Pt niece reports Pt. Takes her medication as prescribed; pharmacy uses [facility] [address]  Pt niece verbalized plan to discharge home via family transport. Pt has no other needs to be addressed at this time. CM reviewed the chart and will continue to monitor.           Payor: [employer] [facility] / Plan: [employer] CHO[employer] / Product Type: Medicare Advantage /     Extended Emergency Contact Information  Primary Emergency Contact: Barbara Ribeiro  Work Phone: [phone]  Mobile Phone: [phone]  Relation: Daughter   needed? No    Discharge Plan A: Home with family  Discharge Plan B: Home with family      [id number] [place] - [address] AT [facility] &   [address]  [place] [address]  Phone: [phone] Fax: [phone]    [facility]  [address]  [place] [address]  Phone: [phone] Fax:  [phone]    [id number] - [facility] [address]  [address] [facility] [address] [facility] [address] & [facility]  [address] [facility] [address]  Phone: [phone] Fax: [phone]      Initial Assessment (most recent)       Adult Discharge Assessment - 12/08/24 1406          Discharge Assessment    Assessment Type Discharge Planning Assessment     Confirmed/corrected address, phone number and insurance Yes     Confirmed Demographics Correct on Facesheet     Source of Information family     If unable to respond/provide information was family/caregiver contacted? Yes     Contact Name/Number Barbara Ribeiro (Daughter)  [phone] (Mobile) Shonna Carey--Niece--[phone]     When was your last doctors appointment? 10/30/24     Does patient/caregiver understand observation status Yes     Communicated VALERIANO with patient/caregiver No     Reason For Admission CVA (cerebral vascular accident)     People in Home spouse     Do you expect to return to your current living situation? Yes     Do you have help at home or someone to help you manage your care at home? Yes     Who are your caregiver(s) and their phone number(s)? Barbara Ribeiro (Daughter)  [phone] (Mobile)     Walking or Climbing Stairs Difficulty yes     Walking or Climbing Stairs ambulation difficulty, assistance 1 person;stair climbing difficulty, assistance 1 person     Dressing/Bathing Difficulty yes     Dressing/Bathing bathing difficulty, assistance 1 person     Home Accessibility not wheelchair accessible     Home Layout Able to live on 1st floor     Readmission within 30 days? No     Do you currently have service(s) that help you manage your care at home? No     Do you take prescription medications? Yes     Do you have prescription coverage? Yes     Coverage [employer] [facility] - [employer] -     Do you have any problems affording any of your prescribed medications? No     Is the patient taking medications as prescribed? yes      Who is going to help you get home at discharge? Barbara Ribeiro (Daughter)  [phone] (Mobile)     How do you get to doctors appointments? family or friend will provide     Are you on dialysis? No     Do you take coumadin? No     Discharge Plan A Home with family     Discharge Plan B Home with family     DME Needed Upon Discharge  none

## 2024-12-08 NOTE — ASSESSMENT & PLAN NOTE
Resume home Amiodarone  Beta blocker on hold in order to prevent relative hypotension  Per records, patient has a CHADS-VASc score of 3  Resume Eliquis once cleared by Neurology (to minimize the risk of hemorrhagic conversion)

## 2024-12-08 NOTE — PLAN OF CARE
Problem: Adult Inpatient Plan of Care  Goal: Plan of Care Review  Outcome: Progressing  Goal: Patient-Specific Goal (Individualized)  Outcome: Progressing  Goal: Absence of Hospital-Acquired Illness or Injury  Outcome: Progressing  Goal: Optimal Comfort and Wellbeing  Outcome: Progressing  Goal: Readiness for Transition of Care  Outcome: Progressing     Problem: Fall Injury Risk  Goal: Absence of Fall and Fall-Related Injury  Outcome: Progressing     Problem: Infection  Goal: Absence of Infection Signs and Symptoms  Outcome: Progressing     Problem: Wound  Goal: Optimal Coping  Outcome: Progressing  Goal: Optimal Functional Ability  Outcome: Progressing  Goal: Absence of Infection Signs and Symptoms  Outcome: Progressing  Goal: Improved Oral Intake  Outcome: Progressing  Goal: Optimal Pain Control and Function  Outcome: Progressing  Goal: Skin Health and Integrity  Outcome: Progressing  Goal: Optimal Wound Healing  Outcome: Progressing     Problem: Bariatric Environmental Safety  Goal: Safety Maintained with Care  Outcome: Progressing

## 2024-12-08 NOTE — PLAN OF CARE
Problem: Physical Therapy  Goal: Physical Therapy Goal  Description: Goals to be met by: 2024     Patient will increase functional independence with mobility by performin. Supine to sit with Moderate Assistance  2. Sit to stand transfer with Moderate Assistance  3. Bed to chair transfer with Moderate Assistance using Rolling Walker  4. Gait  x 50 feet with Moderate Assistance using Rolling Walker.   5. Lower extremity exercise program x20 reps   Outcome: Progressing   PT eval and treat. Sleepy with jerky/jumpy movements of UE's. Thera ex in supine. Long sitting min/mod assist. Pt with multiple falls. LEESA

## 2024-12-08 NOTE — PLAN OF CARE
Patient is a 69 year old female who lives with her elderly  presented with slurred speech and having multiple falls at home. Patient has received morphine and still falls asleep during conversation. She is however alert and oriented X 4. She is currently not slurring. MRI was negative for acute stroke, mention artifact vs micro hemorrhages vs calcification on basal ganglia and cerebellum. Patient was also found to be hypertensive emergency on admission, BP is improved. PT, OT and neurology has been consulted.     Her both lower extremities are erythematous right > left. PO Doxy started. Also she is wheezing. Started breathing Rx.

## 2024-12-08 NOTE — PLAN OF CARE
Problem: Adult Inpatient Plan of Care  Goal: Plan of Care Review  Outcome: Progressing  Goal: Patient-Specific Goal (Individualized)  Outcome: Progressing     Problem: Fall Injury Risk  Goal: Absence of Fall and Fall-Related Injury  Outcome: Progressing     Problem: Wound  Goal: Optimal Coping  Outcome: Progressing  Goal: Optimal Functional Ability  Outcome: Progressing     Problem: Bariatric Environmental Safety  Goal: Safety Maintained with Care  Outcome: Progressing

## 2024-12-08 NOTE — PLAN OF CARE
Problem: SLP  Goal: SLP Goal  Description: 1) The patient will progress from a IDDSI level 4 puree thin liquids level 0  to a IDDSI level 7 regular thin liquids level 0 diet with improved  improved endurance to complete a meal and no pulmonary compromise    2) Complete cognitive linguistic evaluation when level of alertness improved to remain awake/alert.        Outcome: Progressing     Patient presents with decreased level of alertness following administration of morphine last night limiting full swallow/cognitive linguistic evaluation. Given information obtained from family present at the bedside the patient's swallow presents with no s/s of impairment when alert. The patient is resistant to puree diet.  Given that family is present at bedside for all meals, recommend upgrade to Soft bite sized-Level IDDSI 6, Thin Liquid-Level IDDSI 0 given supervision IF patient is awake and alert.  Speech Therapy to follow up next service date for diet tolerance and complete cognitive linguistic evaluation.

## 2024-12-08 NOTE — ASSESSMENT & PLAN NOTE
Patient not a tenecteplase candidate nor a vascular intervention candidate; for now:  NPO   Bedside swallow  Follow NIH  Neuro checks  Statin therapy (when safe to take oral intake)  Check hemoglobin A1c (was 5.2 and 2022)  Check ECHO  Permissive hypertension->prn IV Hydralazine  Neurology consult  PT/OT/speech evaluations  Case management to see-> per my conversation with the family, the patient's care needs or exceeding 1 can be safely performed at home

## 2024-12-08 NOTE — SUBJECTIVE & OBJECTIVE
HPI:  69 y.o. female Referring Facility: [facility] Referring Provider: Thuy Rubio MD LKN: 12/07/2024 @ 3:00 am   Symptoms: Headache, facial droop and slurred speech      Images personally reviewed and interpreted:  Study: Head CT  Study Interpretation: No acute intra-cranial process    No current facility-administered medications for this encounter.    Current Outpatient Medications:     amiodarone (PACERONE) 200 MG Tab, Take 1 tablet (200 mg total) by mouth once daily., Disp: 30 tablet, Rfl: 11    amLODIPine (NORVASC) 2.5 MG tablet, Take 1 tablet (2.5 mg total) by mouth every evening., Disp: 90 tablet, Rfl: 3    apixaban (ELIQUIS) 5 mg Tab, Take 5 mg by mouth 2 (two) times daily., Disp: , Rfl:     atorvastatin (LIPITOR) 10 MG tablet, Take 1 tablet (10 mg total) by mouth every evening., Disp: 90 tablet, Rfl: 3    betamethasone dipropionate 0.05 % cream, Apply topically 2 (two) times daily., Disp: 45 g, Rfl: 2    buPROPion (WELLBUTRIN SR) 150 MG TBSR 12 hr tablet, Take 1 tablet (150 mg total) by mouth 2 (two) times daily., Disp: 180 tablet, Rfl: 3    ergocalciferol (ERGOCALCIFEROL) 50,000 unit Cap, Take 50,000 Units by mouth every 7 days., Disp: , Rfl:     FLUoxetine 40 MG capsule, Take 1 capsule (40 mg total) by mouth once daily., Disp: 90 capsule, Rfl: 3    gabapentin (NEURONTIN) 300 MG capsule, Take 1 capsule (300 mg total) by mouth 3 (three) times daily., Disp: 270 capsule, Rfl: 3    HYDROcodone-acetaminophen (NORCO) 5-325 mg per tablet, Take 1 tablet by mouth every 6 (six) hours as needed for Pain., Disp: 90 tablet, Rfl: 0    HYDROcodone-acetaminophen (NORCO) 5-325 mg per tablet, Take 1 tablet by mouth every 6 (six) hours as needed for Pain., Disp: 90 tablet, Rfl: 0    HYDROcodone-acetaminophen (NORCO) 5-325 mg per tablet, Take 1 tablet by mouth every 6 (six) hours as needed for Pain., Disp: 90 tablet, Rfl: 0    loratadine (CLARITIN) 10 mg tablet, Take 10 mg by mouth once daily., Disp: , Rfl:      methocarbamoL (ROBAXIN) 750 MG Tab, Take 1 tablet (750 mg total) by mouth nightly as needed (muscle pain/spasms)., Disp: 20 tablet, Rfl: 1    metoprolol succinate (TOPROL-XL) 25 MG 24 hr tablet, Take 1 tablet (25 mg total) by mouth once daily., Disp: 90 tablet, Rfl: 3    mirtazapine (REMERON) 15 MG tablet, Take 1 tablet (15 mg total) by mouth every evening. For sleep, Disp: 30 tablet, Rfl: 3    mupirocin (BACTROBAN) 2 % ointment, Apply topically 2 (two) times daily. To infected skin tear, Disp: 22 g, Rfl: 1    traZODone (DESYREL) 50 MG tablet, Take 2 tablets (100 mg total) by mouth nightly as needed for Insomnia., Disp: 60 tablet, Rfl: 2    triamcinolone acetonide 0.1% (KENALOG) 0.1 % cream, Apply topically 2 (two) times daily., Disp: 60 g, Rfl: 2    Facility-Administered Medications Ordered in Other Encounters:     lidocaine (PF) 10 mg/ml (1%) injection 5 mg, 0.5 mL, Intradermal, Once, Azeem Anderson MD       Assessment and plan:  TIA/Stroke/    Continue Eliquis  Admit for MRI brain/MRA head without contrast  Neuro consult    Lytics recommendation: Thrombolytic therapy not recommended due to Outside of treatment window and full dose AC-Eliquis    Thrombectomy recommendation: Awaiting CTA results from [facility] for determination   Placement recommendation: admit to inpatient

## 2024-12-08 NOTE — PT/OT/SLP EVAL
Physical Therapy Evaluation    Patient Name:  Iris Mueller   MRN:  [id number]    Recommendations:     Discharge Recommendations: Moderate Intensity Therapy   Discharge Equipment Recommendations: none   Barriers to discharge: Decreased caregiver support    Assessment:     Iris Mueller is a 69 y.o. female admitted with a medical diagnosis of CVA (cerebral vascular accident).  She presents with the following impairments/functional limitations: weakness, impaired endurance, impaired functional mobility, gait instability, impaired balance, impaired cognition, decreased lower extremity function, decreased safety awareness, impaired skin, impaired cardiopulmonary response to activity .    Pt seen supine in bed with niece at bedside. Pt sleepy- awakened for thera ex but easily drifting off to sleep. LE's erythematous, multiple bruising of UEs with skin tears. Pt with multiple falls at home and lived with spouse who is visually and hearing impaired. Pt has daughter and niece that comes and check on them. Pt was previously ambulatory but has been recently falling.  Pt seen for thera ex with noted jerky and jumping movements of UE's. Cueing to attend to task. Min/mod asist for long sitting.  Pt to benefit from [facility]    Rehab Prognosis: Fair; patient would benefit from acute skilled PT services to address these deficits and reach maximum level of function.    Recent Surgery: * No surgery found *      Plan:     During this hospitalization, patient to be seen 6 x/week to address the identified rehab impairments via gait training, therapeutic activities, therapeutic exercises, neuromuscular re-education and progress toward the following goals:    Plan of Care Expires:  12/30/24    Subjective   Niece stated daughter stayed the night and just left to go home  Pt stated is ambulatory at home short distance  Pt sleeps in a lift chair- able to use bathroom  Pt able to converse but gets confused  Chief Complaint: pt  "in and out of sleep with hallucinations" cannot stand how the house is dirty" "looking for bar of soap"  Patient/Family Comments/goals: none  Pain/Comfort:  Pain Rating 1: 0/10    Patients cultural, spiritual, Voodoo conflicts given the current situation:      Living Environment:  Home with spouse- hearing and visually impaired  Prior to admission, patients level of function was ambulatory short distance with several falls.  Equipment used at home: walker, rolling.  DME owned (not currently used): none.  Upon discharge, patient will have assistance from family.    Objective:     Communicated with nurse Mora prior to session.  Patient found HOB elevated with telemetry, bed alarm, PureWick, perineural catheter, pulse ox (continuous)  upon PT entry to room.    General Precautions: Standard, fall  Orthopedic Precautions:N/A   Braces: N/A  Respiratory Status: Nasal cannula, flow 2 L/min    Exams:  Postural Exam:  Patient presented with the following abnormalities:    -       Rounded shoulders  -       Forward head  -       BMI 41.39  RLE ROM: WFL except edematous  RLE Strength: Deficits: 2+/5  LLE ROM: WFL except edema  LLE Strength: Deficits: 2+/5    Functional Mobility:  Bed Mobility:     Scooting: maximal assistance and total assistance  Supine to Sit: minimum assistance and moderate assistance via long sitting      AM-PAC 6 CLICK MOBILITY  Total Score:7       Treatment & Education:  Patient was educated on the importance of OOB activity and functional mobility to negate negative effects of prolonged bed rest during hospitalization, safe transfers and ambulation, and D/C planning   Thera ex in supine with AP,QS/GS,HS,assisted SLR. Lots of cueing to attend to task  Long sitting min/mod assist    Patient left HOB elevated with all lines intact, call button in reach, and bed alarm on.    GOALS:   Multidisciplinary Problems       Physical Therapy Goals          Problem: Physical Therapy    Goal Priority " Disciplines Outcome Interventions   Physical Therapy Goal     PT, PT/OT Progressing    Description: Goals to be met by: 2024     Patient will increase functional independence with mobility by performin. Supine to sit with Moderate Assistance  2. Sit to stand transfer with Moderate Assistance  3. Bed to chair transfer with Moderate Assistance using Rolling Walker  4. Gait  x 50 feet with Moderate Assistance using Rolling Walker.   5. Lower extremity exercise program x20 reps                        History:     Past Medical History:   Diagnosis Date    Anemia, unspecified     Arthritis     Asthma     COPD (chronic obstructive pulmonary disease)     Encounter for blood transfusion     Hypertension     Obese body habitus     Wound infection 2019    Right knee       Past Surgical History:   Procedure Laterality Date    ANGIOGRAM, CORONARY, WITH LEFT HEART CATHETERIZATION N/A 2022    Procedure: Angiogram, Coronary, with Left Heart Cath;  Surgeon: Jorge Tran MD;  Location: [facility] CATH/EP LAB;  Service: Cardiology;  Laterality: N/A;     SECTION      ECHOCARDIOGRAM,TRANSESOPHAGEAL N/A 2023    Procedure: Transesophageal echo (MARIANO) intra-procedure log documentation;  Surgeon: Vikram, Araceli Diagnostic;  Location: [facility] EP LAB;  Service: Cardiology;  Laterality: N/A;    INCISION AND DRAINAGE OF HEMATOMA Left 2024    Procedure: INCISION AND DRAINAGE, HEMATOMA;  Surgeon: Bryson Huerta MD;  Location: [facility] OR;  Service: Orthopedics;  Laterality: Left;    JOINT REPLACEMENT      Knee    KNEE ARTHROPLASTY Right 2019    Procedure: ARTHROPLASTY, KNEE;  Surgeon: Delio Chung MD;  Location: [facility] OR;  Service: Orthopedics;  Laterality: Right;  anesthesia:  general and block    REPLACEMENT OF WOUND VACUUM-ASSISTED CLOSURE DEVICE Right 2019    Procedure: REPLACEMENT, WOUND VAC;  Surgeon: Delio Chung MD;  Location: [facility] OR;  Service: Orthopedics;  Laterality: Right;     TONSILLECTOMY      TREATMENT OF CARDIAC ARRHYTHMIA N/A 8/31/2023    Procedure: Cardioversion or Defibrillation;  Surgeon: PJ Betancourt MD;  Location: [facility] EP LAB;  Service: Cardiology;  Laterality: N/A;  AF, MARIANO, DCCV, MAC, EH, 3 Prep    VENTRICULOGRAM, LEFT  12/23/2022    Procedure: Ventriculogram, Left;  Surgeon: Jorge Tran MD;  Location: [facility] CATH/EP LAB;  Service: Cardiology;;       Time Tracking:     PT Received On: 12/08/24  PT Start Time: 1003     PT Stop Time: 1021  PT Total Time (min): 18 min     Billable Minutes: Evaluation 10 and Therapeutic Exercise 8      12/08/2024

## 2024-12-08 NOTE — ED NOTES
Bladder scanner shows volume less than 30cc. Pt denies need to void at this time. Will continue to monitor & reassess for UA sample.

## 2024-12-08 NOTE — TELEMEDICINE CONSULT
[facility]  Vascular Neurology  Comprehensive Stroke Center  TeleVascular Neurology Acute Consultation Note        Consult Information  Consults    Consulting Provider: TERESA HAMMER   Current Providers  No providers found    Patient Location:  [facility] EMERGENCY DEPARTMENT Emergency Department    [facility] hospital nurse at bedside with patient assisting consultant.  Patient information was obtained from patient.       Stroke Documentation  Acute Stroke Times   Last Known Normal Date: 12/07/24  Last Known Normal Time: 0300  Stroke Team Called Date: 12/07/24  Stroke Team Called Time: 1952  Stroke Team Arrival Date: 12/07/24  Stroke Team Arrival Time: 1952  Thrombolytic Therapy Recommended: No    NIH Scale:  1a. Level of Consciousness: 0-->Alert, keenly responsive  1b. LOC Questions: 0-->Answers both questions correctly  1c. LOC Commands: 0-->Performs both tasks correctly  2. Best Gaze: 0-->Normal  3. Visual: 0-->No visual loss  4. Facial Palsy: 0-->Normal symmetrical movements  5a. Motor Arm, Left: 0-->No drift, limb holds 90 (or 45) degrees for full 10 secs  5b. Motor Arm, Right: 0-->No drift, limb holds 90 (or 45) degrees for full 10 secs  6a. Motor Leg, Left: 0-->No drift, leg holds 30 degree position for full 5 secs  6b. Motor Leg, Right: 0-->No drift, leg holds 30 degree position for full 5 secs  7. Limb Ataxia: 0-->Absent  8. Sensory: 0-->Normal, no sensory loss  9. Best Language: 0-->No aphasia, normal  10. Dysarthria: 1-->Mild-to-moderate dysarthria, patient slurs at least some words and, at worst, can be understood with some difficulty  11. Extinction and Inattention (formerly Neglect): 0-->No abnormality  Total (NIH Stroke Scale): 1      Modified Center:    David Coma Scale:     ABCD2 Score:    [id number] Score:    HAS -BLED Score:    ICH Score:    Hunt & Nur Classification:      Blood pressure (!) 206/100, pulse 91, temperature 98.1 °F (36.7 °C), temperature source Oral, resp. rate 18,  "height 5' 3" (1.6 m), weight 99.8 kg (220 lb), SpO2 96%.      In my opinion, this was a: Tier 1; VAN Stroke Assessment: Negative     Medical Decision Making  HPI:  69 y.o. female Referring Facility: [facility] Referring Provider: Thuy Rubio MD LKN: 12/07/2024 @ 3:00 am   Symptoms: Headache, facial droop and slurred speech      Images personally reviewed and interpreted:  Study: Head CT  Study Interpretation: No acute intra-cranial process    No current facility-administered medications for this encounter.    Current Outpatient Medications:     amiodarone (PACERONE) 200 MG Tab, Take 1 tablet (200 mg total) by mouth once daily., Disp: 30 tablet, Rfl: 11    amLODIPine (NORVASC) 2.5 MG tablet, Take 1 tablet (2.5 mg total) by mouth every evening., Disp: 90 tablet, Rfl: 3    apixaban (ELIQUIS) 5 mg Tab, Take 5 mg by mouth 2 (two) times daily., Disp: , Rfl:     atorvastatin (LIPITOR) 10 MG tablet, Take 1 tablet (10 mg total) by mouth every evening., Disp: 90 tablet, Rfl: 3    betamethasone dipropionate 0.05 % cream, Apply topically 2 (two) times daily., Disp: 45 g, Rfl: 2    buPROPion (WELLBUTRIN SR) 150 MG TBSR 12 hr tablet, Take 1 tablet (150 mg total) by mouth 2 (two) times daily., Disp: 180 tablet, Rfl: 3    ergocalciferol (ERGOCALCIFEROL) 50,000 unit Cap, Take 50,000 Units by mouth every 7 days., Disp: , Rfl:     FLUoxetine 40 MG capsule, Take 1 capsule (40 mg total) by mouth once daily., Disp: 90 capsule, Rfl: 3    gabapentin (NEURONTIN) 300 MG capsule, Take 1 capsule (300 mg total) by mouth 3 (three) times daily., Disp: 270 capsule, Rfl: 3    HYDROcodone-acetaminophen (NORCO) 5-325 mg per tablet, Take 1 tablet by mouth every 6 (six) hours as needed for Pain., Disp: 90 tablet, Rfl: 0    HYDROcodone-acetaminophen (NORCO) 5-325 mg per tablet, Take 1 tablet by mouth every 6 (six) hours as needed for Pain., Disp: 90 tablet, Rfl: 0    HYDROcodone-acetaminophen (NORCO) 5-325 mg per tablet, Take 1 tablet by " mouth every 6 (six) hours as needed for Pain., Disp: 90 tablet, Rfl: 0    loratadine (CLARITIN) 10 mg tablet, Take 10 mg by mouth once daily., Disp: , Rfl:     methocarbamoL (ROBAXIN) 750 MG Tab, Take 1 tablet (750 mg total) by mouth nightly as needed (muscle pain/spasms)., Disp: 20 tablet, Rfl: 1    metoprolol succinate (TOPROL-XL) 25 MG 24 hr tablet, Take 1 tablet (25 mg total) by mouth once daily., Disp: 90 tablet, Rfl: 3    mirtazapine (REMERON) 15 MG tablet, Take 1 tablet (15 mg total) by mouth every evening. For sleep, Disp: 30 tablet, Rfl: 3    mupirocin (BACTROBAN) 2 % ointment, Apply topically 2 (two) times daily. To infected skin tear, Disp: 22 g, Rfl: 1    traZODone (DESYREL) 50 MG tablet, Take 2 tablets (100 mg total) by mouth nightly as needed for Insomnia., Disp: 60 tablet, Rfl: 2    triamcinolone acetonide 0.1% (KENALOG) 0.1 % cream, Apply topically 2 (two) times daily., Disp: 60 g, Rfl: 2    Facility-Administered Medications Ordered in Other Encounters:     lidocaine (PF) 10 mg/ml (1%) injection 5 mg, 0.5 mL, Intradermal, Once, Azeem Anderson MD       Assessment and plan:  TIA/Stroke/    Continue Eliquis  Admit for MRI brain/MRA head without contrast  Neuro consult    Lytics recommendation: Thrombolytic therapy not recommended due to Outside of treatment window and full dose AC-Eliquis    Thrombectomy recommendation: Awaiting CTA results from [facility] for determination   Placement recommendation: admit to inpatient               ROS  Physical Exam  Past Medical History:   Diagnosis Date    Anemia, unspecified     Arthritis     Asthma     COPD (chronic obstructive pulmonary disease)     Encounter for blood transfusion     Hypertension     Obese body habitus     Wound infection 08/2019    Right knee     Past Surgical History:   Procedure Laterality Date    ANGIOGRAM, CORONARY, WITH LEFT HEART CATHETERIZATION N/A 12/23/2022    Procedure: Angiogram, Coronary, with Left Heart Cath;  Surgeon: Jorge  MD Marc;  Location: [facility] CATH/EP LAB;  Service: Cardiology;  Laterality: N/A;     SECTION      ECHOCARDIOGRAM,TRANSESOPHAGEAL N/A 2023    Procedure: Transesophageal echo (MARIANO) intra-procedure log documentation;  Surgeon: Vikram, Araceli Diagnostic;  Location: [facility] EP LAB;  Service: Cardiology;  Laterality: N/A;    INCISION AND DRAINAGE OF HEMATOMA Left 2024    Procedure: INCISION AND DRAINAGE, HEMATOMA;  Surgeon: Bryson Huerta MD;  Location: [facility] OR;  Service: Orthopedics;  Laterality: Left;    JOINT REPLACEMENT      Knee    KNEE ARTHROPLASTY Right 2019    Procedure: ARTHROPLASTY, KNEE;  Surgeon: Delio Chung MD;  Location: [facility] OR;  Service: Orthopedics;  Laterality: Right;  anesthesia:  general and block    REPLACEMENT OF WOUND VACUUM-ASSISTED CLOSURE DEVICE Right 2019    Procedure: REPLACEMENT, WOUND VAC;  Surgeon: Delio Chung MD;  Location: [facility] OR;  Service: Orthopedics;  Laterality: Right;    TONSILLECTOMY      TREATMENT OF CARDIAC ARRHYTHMIA N/A 2023    Procedure: Cardioversion or Defibrillation;  Surgeon: PJ Betancourt MD;  Location: [facility] EP LAB;  Service: Cardiology;  Laterality: N/A;  AF, MARIANO, DCCV, MAC, EH, 3 Prep    VENTRICULOGRAM, LEFT  2022    Procedure: Ventriculogram, Left;  Surgeon: Jorge Tran MD;  Location: [facility] CATH/EP LAB;  Service: Cardiology;;     Family History   Problem Relation Name Age of Onset    Alzheimer's disease Mother         Diagnoses  Stroke like symptoms      Leeanne Wheeler MD      Emergent/Acute neurological consultation requested by spoke provider due to critical concerns for possible cerebrovascular event that could result in permanent loss of neurologic/bodily function, severe disability or death of this patient.  Immediate/timely evaluation by a highly prepared expert is paramount for optimal outcomes  High risk for neurological deterioration if not properly diagnosed  High risk for neurological  deterioration if not treated promplty/as soon as possible  Complex diagnostic evaluation may be required (advanced imaging)  High risk treatment options (thrombolytics and/or thrombectomy)    Patient care was coordinated with spoke provider, including but not limted to    Discussing likely diagnosis/etiology of symptoms  Making recommendations for further diagnostic studies  Making recommendations for intravenous thrombolytics or other advanced therapies  Making recommendations for disposition (admission/transfer for higher level of care)      Neurology consultation requested by spoke provider. Audiovisual encounter with the patient performed using a secure connection.  Results and impressions from the visit are documented on this note and were communicated to the consulting provider/team via direct communication. The note has been shared for addition to the patients electronic medical record.

## 2024-12-08 NOTE — PLAN OF CARE
Problem: SLP  Goal: SLP Goal  Description: 1) The patient will progress from a IDDSI level 4 puree thin liquids level 0  to a IDDSI level 7 regular thin liquids level 0 diet with improved  improved endurance to complete a meal and no pulmonary compromise.    12/8/2024 1108 by Lacey Mendenhall L-SLP, CCC-SLP  Outcome: Progressing   Clinical Swallow Exam completed. Ted-pharyngeal swallow appears intact, however the patient with definite s/s of esophageal dysphagia with suspected Laryngopharyngeal reflux disease (LERD). Transient dysarthria and cognitive deficits observed and reported for some time which appear to coincide with medications especially pain. Speech Therapy not indicated due to transient nature of deficits . Will follow for diet tolerance and education re: reflux precautions.

## 2024-12-08 NOTE — CONSULTS
"[facility]  Department of Neurology  Neurology Consultation Note        PATIENT NAME: Iris Mueller  MRN: [id number]  CSN: [id number]      TODAY'S DATE: 12/08/2024  ADMIT DATE: 12/7/2024                            CONSULTING PROVIDER: Denton Noonan MD  CONSULT REQUESTED BY: Garcia Soriano MD      Reason for consult:  Dysarthria      History obtained from chart review and the patient.    HPI per EMR: Iris Mueller is a 69 y.o. female with a history of hypertension, CKD-4, atrial fibrillation (on Eliquis; status post cardioversion in August 2023), morbid obesity, COPD, hyperlipidemia, CAD, aortic stenosis/regurgitation, stroke, and history of systolic/diastolic congestive heart failure. The patient was brought in tonight because of recurrent falls. Over the last 2-3 weeks, the patient had complained that she was "off balance" and had fallen 7-8 times. The patient lives at home with her  who has a visual and hearing impediment.  Her children at bedside share concern that the patient takes her prescribed narcotics inappropriately.     Neurology consult:  Patient was seen and examined by me.  She is alert and oriented x3.  She has a history of AFib and is supposed to be on Eliquis however not taking it for the past 2 weeks.  She presented to the hospital with dysarthria/aphasia.  She did not have any other associated symptoms including weakness, vision loss, sensory changes, dizziness or lightheadedness.    NIH stroke scale was 1 in the ER and she was not a candidate for tPA. Symptoms improved after admitting to the hospital.  Currently she states her speech is at baseline.    Her blood pressure on arrival to the ER was greater than 220 systolic.  Patient's niece is at bedside states that her blood pressure usually runs in the normal range at home.  She also has complains of intermittent positional vertigo which has been going on for past few months.  She also has been " having episodes of falling asleep during the daytime while sitting in the couch.  Knees were thinks she passes out as she immediately responds to stimulus.        PREVIOUS MEDICAL HISTORY:  Past Medical History:   Diagnosis Date    Anemia, unspecified     Arthritis     Asthma     COPD (chronic obstructive pulmonary disease)     Encounter for blood transfusion     Hypertension     Obese body habitus     Wound infection 2019    Right knee     PREVIOUS SURGICAL HISTORY:  Past Surgical History:   Procedure Laterality Date    ANGIOGRAM, CORONARY, WITH LEFT HEART CATHETERIZATION N/A 2022    Procedure: Angiogram, Coronary, with Left Heart Cath;  Surgeon: Jorge Tran MD;  Location: [facility] CATH/EP LAB;  Service: Cardiology;  Laterality: N/A;     SECTION      ECHOCARDIOGRAM,TRANSESOPHAGEAL N/A 2023    Procedure: Transesophageal echo (MARIANO) intra-procedure log documentation;  Surgeon: Vikram, Araceli Diagnostic;  Location: [facility] EP LAB;  Service: Cardiology;  Laterality: N/A;    INCISION AND DRAINAGE OF HEMATOMA Left 2024    Procedure: INCISION AND DRAINAGE, HEMATOMA;  Surgeon: Bryson Huerta MD;  Location: [facility] OR;  Service: Orthopedics;  Laterality: Left;    JOINT REPLACEMENT      Knee    KNEE ARTHROPLASTY Right 2019    Procedure: ARTHROPLASTY, KNEE;  Surgeon: Delio Chung MD;  Location: [facility] OR;  Service: Orthopedics;  Laterality: Right;  anesthesia:  general and block    REPLACEMENT OF WOUND VACUUM-ASSISTED CLOSURE DEVICE Right 2019    Procedure: REPLACEMENT, WOUND VAC;  Surgeon: Delio Chung MD;  Location: [facility] OR;  Service: Orthopedics;  Laterality: Right;    TONSILLECTOMY      TREATMENT OF CARDIAC ARRHYTHMIA N/A 2023    Procedure: Cardioversion or Defibrillation;  Surgeon: PJ Betancourt MD;  Location: [facility] EP LAB;  Service: Cardiology;  Laterality: N/A;  AF, MARIANO, DCCV, MAC, EH, 3 Prep    VENTRICULOGRAM, LEFT  2022    Procedure: Ventriculogram, Left;   Surgeon: Jorge Tran MD;  Location: [facility] CATH/EP LAB;  Service: Cardiology;;     FAMILY MEDICAL HISTORY:  Family History   Problem Relation Name Age of Onset    Alzheimer's disease Mother       ALLERGIES:  Review of patient's allergies indicates:  No Known Allergies  HOME MEDICATIONS:  Prior to Admission medications    Medication Sig Start Date End Date Taking? Authorizing Provider   amiodarone (PACERONE) 200 MG Tab Take 1 tablet (200 mg total) by mouth once daily. 1/8/24   PJ Betancourt MD   amLODIPine (NORVASC) 2.5 MG tablet Take 1 tablet (2.5 mg total) by mouth every evening. 10/18/23   Gretta Camacho NP   apixaban (ELIQUIS) 5 mg Tab Take 5 mg by mouth 2 (two) times daily.    Provider, Historical   atorvastatin (LIPITOR) 10 MG tablet Take 1 tablet (10 mg total) by mouth every evening. 5/20/24   Roselyn Cote NP   betamethasone dipropionate 0.05 % cream Apply topically 2 (two) times daily. 6/10/24   Roselyn Cote NP   buPROPion (WELLBUTRIN SR) 150 MG TBSR 12 hr tablet Take 1 tablet (150 mg total) by mouth 2 (two) times daily. 6/10/24 6/10/25  Roselyn Cote NP   ergocalciferol (ERGOCALCIFEROL) 50,000 unit Cap Take 50,000 Units by mouth every 7 days. 7/13/23   Provider, Historical   FLUoxetine 40 MG capsule Take 1 capsule (40 mg total) by mouth once daily. 5/16/24   Elkin You MD   gabapentin (NEURONTIN) 300 MG capsule Take 1 capsule (300 mg total) by mouth 3 (three) times daily. 4/8/24 4/8/25  Roselyn Cote NP   HYDROcodone-acetaminophen (NORCO) 5-325 mg per tablet Take 1 tablet by mouth every 6 (six) hours as needed for Pain. 9/8/24   Elkin You MD   HYDROcodone-acetaminophen (NORCO) 5-325 mg per tablet Take 1 tablet by mouth every 6 (six) hours as needed for Pain. 11/7/24   Elkin You MD   HYDROcodone-acetaminophen (NORCO) 5-325 mg per tablet Take 1 tablet by mouth every 6 (six) hours as needed for Pain. 12/7/24   Elkin You MD   loratadine  "(CLARITIN) 10 mg tablet Take 10 mg by mouth once daily.    Provider, Historical   methocarbamoL (ROBAXIN) 750 MG Tab Take 1 tablet (750 mg total) by mouth nightly as needed (muscle pain/spasms). 4/15/24   Roselyn Cote NP   metoprolol succinate (TOPROL-XL) 25 MG 24 hr tablet Take 1 tablet (25 mg total) by mouth once daily. 6/10/24 6/10/25  Roselyn Cote NP   mirtazapine (REMERON) 15 MG tablet Take 1 tablet (15 mg total) by mouth every evening. For sleep 10/30/24 10/30/25  Elkin You MD   mupirocin (BACTROBAN) 2 % ointment Apply topically 2 (two) times daily. To infected skin tear 10/16/24   Roselyn Cote NP   traZODone (DESYREL) 50 MG tablet Take 2 tablets (100 mg total) by mouth nightly as needed for Insomnia. 9/13/24   Jeremiah De Paz, PAOumarC   triamcinolone acetonide 0.1% (KENALOG) 0.1 % cream Apply topically 2 (two) times daily. 10/30/24   Elkin You MD     CURRENT SCHEDULED MEDICATIONS:   amiodarone  200 mg Oral Daily    atorvastatin  10 mg Oral QHS    nicotine  1 patch Transdermal Daily     CURRENT INFUSIONS:    CURRENT PRN MEDICATIONS:    Current Facility-Administered Medications:     hydrALAZINE, 10 mg, Intravenous, Q4H PRN    ondansetron, 4 mg, Intravenous, Q6H PRN    REVIEW OF SYSTEMS:  Please refer to the HPI for all pertinent positive and negative findings. A comprehensive review of all other systems was negative.    PHYSICAL EXAM:  Patient Vitals for the past 24 hrs:   BP Temp Temp src Pulse Resp SpO2 Height Weight   12/08/24 0817 -- -- -- 75 18 97 % -- --   12/08/24 0732 (!) 174/114 100 °F (37.8 °C) Oral 72 16 (!) 90 % -- --   12/08/24 0545 (!) 138/95 -- -- 88 -- -- -- --   12/08/24 0532 (!) 182/119 97.3 °F (36.3 °C) Oral 74 17 97 % -- --   12/08/24 0500 -- -- -- -- -- -- 5' 3" (1.6 m) 106 kg (233 lb 10.2 oz)   12/08/24 0415 -- -- -- 86 -- -- -- --   12/08/24 0300 (!) 156/116 -- -- 77 20 98 % -- --   12/08/24 0200 (!) 151/72 -- -- 74 20 96 % -- --   12/08/24 0100 (!) " "157/91 -- -- 79 20 98 % -- --   12/08/24 0047 (!) 133/92 -- -- 80 (!) 22 99 % -- --   12/08/24 0000 -- -- -- 80 (!) 22 (!) 94 % -- --   12/07/24 2330 (!) 146/108 -- -- 78 20 97 % -- --   12/07/24 2300 (!) 175/106 -- -- 85 20 96 % -- --   12/07/24 2230 (!) 183/114 -- -- 92 14 97 % -- --   12/07/24 2138 -- -- -- 96 18 98 % -- --   12/07/24 2130 (!) 224/109 -- -- 87 19 100 % -- --   12/07/24 2030 (!) 206/100 -- -- 91 18 96 % -- --   12/07/24 1950 -- -- -- 83 18 98 % -- --   12/07/24 1944 124/86 98.1 °F (36.7 °C) Oral 82 16 98 % 5' 3" (1.6 m) 99.8 kg (220 lb)       GENERAL APPEARANCE: Alert, well-developed, well-nourished female in no acute distress.  HEENT: Normocephalic and atraumatic. PERRL. Oropharynx unremarkable.  PULM: Normal respiratory effort. No accessory muscle use.  CV: RRR.  ABDOMEN: Soft, nontender.       NEURO:  MENTAL STATUS: Patient awake and oriented to time, place, and person, recent/remote memory normal, attention span/concentration normal, and speech fluent without paraphasic errors.  Affect euthymic.    CRANIAL NERVES:  CN I: Not tested.  CN II: Fundoscopic exam deferred.  CN III, IV, VI: Pupils equal, round and reactive to light.  Extraocular movements full and intact.  CN V: Facial sensation normal.  CN VII: Facial asymmetry absent.  CN VIII: Hearing grossly normal and equal bilaterally.  No skew deviation or pathologic nystagmus.  CN IX, X: Palate elevates symmetrically. Speech/articulation is clear without dysarthria.  CN XI: Shoulder shrug and chin rotation equal with good strength.  CN XII: Tongue protrusion midline.    MOTOR:  Bulk normal. Tone normal and symmetric throughout.  Abnormal movements absent.  Tremor: none present.  Strength  bilateral upper extremities 4+/5, bilateral lower extremities 3+/5 .    REFLEXES:  DTRs 1+ throughout.  Plantar response equivocal bilaterally.  SENSATION: grossly intact throughout.  COORDINATION: normal finger-to-nose.  STATION: not tested.  GAIT: not " tested.      NIHSS:  1a      Level of Consciousness (alert, drowsy, etc.):   0=alert; keenly responsive  1b.     Level of Consciousness Questions (month, age): 0= able to answer both questions  1c.     Level of Consciousness Commands (open, close eyes, make fist, let go):  0=Answers both tasks correctly  2.      Best Gaze (eyes open - patient follows examiner's finger or face):      0=normal  3.      Visual (introduce visual stimulus/threat to patient's visual field quadrants):  0=No visual loss  4.      Facial Palsy (show teeth, raise eyebrows and squeeze eyes shut):        0=Normal symmetric movement  5a.     Motor Arm - Left (elevate extremity 90 degrees and score drift/movement):       0=No drift, limb holds 90 (or 45) degrees for full 10 seconds  5b.     Motor Arm - Right (elevate extremity 90 degrees and score drift/movement):      0=No drift, limb holds 90 (or 45) degrees for full 10 seconds  6a.     Motor Leg - Left (elevate extremity 30 degrees and score drift/movement):       0=No drift, limb holds 90 (or 45) degrees for full 10 seconds  6b      Motor Leg - Right (elevate extremity 30 degrees and score drift/movement):      0=No drift, limb holds 90 (or 45) degrees for full 10 seconds  7.      Limb Ataxia (finger-nose, heel down shin):      0=Absent  8.      Sensory (pin prick to face, arm, trunk, and leg - compare side to side):        0=Normal; no sensory loss  9.      Best Language (name items, describe a picture and read sentences):      0=No aphasia, normal  10.     Dysarthria (evaluate speech clarity by patient repeating listed words): 0=Normal  11.     Extinction and Inattention (use prior testing to identify neglect or double simultaneous stimuli testing):      0=No abnormality          NIH Stroke Scale Total:         0      Labs:  Recent Labs   Lab 12/07/24 1955 12/08/24  0349   * 135*   K 4.1 4.2    105   CO2 24 23   BUN 24* 23   CREATININE 2.2* 2.1*   GLU 90 89   CALCIUM 8.6* 8.3*  "  MG  --  1.7     Recent Labs   Lab 12/07/24 1955 12/08/24  0349   WBC 7.30 6.02   HGB 8.9* 8.7*   HCT 31.0* 31.6*    216     Recent Labs   Lab 12/07/24 1955 12/08/24  0349   ALBUMIN 3.7 3.4*   PROT 6.5 6.0   BILITOT 0.8 0.7   ALKPHOS 141* 124   ALT 14 10   AST 13 10     Lab Results   Component Value Date    INR 1.1 12/07/2024     Lab Results   Component Value Date    TRIG 90 12/07/2024    HDL 38 (L) 12/07/2024    CHOLHDL 38.0 12/07/2024     Lab Results   Component Value Date    HGBA1C 5.3 12/08/2024     No results found for: "PROTEINCSF", "GLUCCSF", "WBCCSF"    Imaging:  I have reviewed and interpreted the pertinent imaging and lab results.      MRA Brain without contrast  Narrative: EXAMINATION:  MRA BRAIN WITHOUT CONTRAST    CLINICAL HISTORY:  Neuro deficit, acute, stroke suspected;    TECHNIQUE:  Per separate acquisition, Non-contrast 3-D time-of-flight intracranial MR angiography was performed through the [place] with MIP reformatting.    COMPARISON:  MRI of the brain dated 12/07/2024 at 22:20 hours.    FINDINGS:  There are significant motion limitations to the examination.    There is apparent loss of flow related enhancement within the left vertebral artery.  There is also similar loss of flow related enhancement within the left M2 branches.  There is absence of flow related enhancement within the left posterior cerebral artery.    There is a large PCOM supplying the right posterior cerebral artery.  Impression: Examination is significantly limited secondary to motion with apparent absence of flow related enhancement within the left middle cerebral artery branches, left vertebral artery, and the left posterior cerebral artery.  This is most likely artifactual.    CTA may be attempted for further evaluation.    This report was flagged in Epic as abnormal.    Electronically signed by: Justin Burnett MD  Date:    12/07/2024  Time:    23:29  MRI Brain Without Contrast  Addendum: Note that the " examination is limited by patient motion artifact.  Upon  further review, linear focus of apparent restricted diffusion on DWI with  corresponding ADC abnormality in the left cerebellum representing acute  infarct versus artifact.  Additional punctate focus of apparent restricted  diffusion in the right frontal lobe representing infarct versus artifact.   If clinically warranted, recommend repeat examination when patient is able  to tolerate procedure.     COMMUNICATION   The key critical information above was relayed directly by Marce Narvaez by Epic secure chat (with acknowledgement) to Renaldo Rodriguez MD on  12/7/2024 at 23:14.     Electronically signed by: Marce Narvaez   Date:    12/07/2024   Time:    23:17  Narrative: EXAMINATION:  MRI BRAIN WITHOUT CONTRAST    CLINICAL HISTORY:  Stroke, follow up;.    TECHNIQUE:  Multiplanar multisequence MR imaging of the brain was performed without contrast.    COMPARISON:  None    FINDINGS:  Motion artifact.    Intracranial compartment:    Ventricles and sulci are normal in size for age without evidence of hydrocephalus. No extra-axial blood or fluid collections.    No acute ischemic process.  Moderate to advanced periventricular and deep white matter changes of chronic microvascular ischemia.  GRE sequence demonstrates punctate focus of susceptibility artifact in the right cerebellum and left basal ganglia representing microhemorrhage or calcification.    Normal vascular flow voids are preserved.    Skull/extracranial contents (limited evaluation): Bone marrow signal intensity is normal.  Impression: No acute ischemic process. Moderate to advanced periventricular and deep white matter changes of chronic microvascular ischemia. GRE sequence demonstrates punctate focus of susceptibility artifact in the right cerebellum and left basal ganglia representing microhemorrhage or calcification.    Electronically signed by: Marce  Brice  Date:    12/07/2024  Time:    23:06  CT Cervical Spine Without Contrast  Narrative: EXAMINATION:  CT CERVICAL SPINE WITHOUT CONTRAST    CLINICAL HISTORY:  Neck trauma (Age >= 65y);    TECHNIQUE:  Low dose axial images, sagittal and coronal reformations were performed though the cervical spine.  Contrast was not administered.    COMPARISON:  01/12/2024    FINDINGS:  Reversal of the normal cervical lordosis.  Osteopenia.  No detectable acute fracture.  Minimal anterolisthesis at C2-3.  Moderate to advanced multilevel spondylosis.  Up to moderate central canal stenosis.  Severe bilateral foraminal stenosis at multiple levels.  Impression: No acute trauma.    Electronically signed by: Marce Narvaez  Date:    12/07/2024  Time:    22:05  X-Ray Shoulder Trauma Left  Narrative: EXAMINATION:  XR SHOULDER TRAUMA 3 VIEW LEFT    CLINICAL HISTORY:  Pain in left shoulder    TECHNIQUE:  Three views of the left shoulder were performed.    COMPARISON  None    FINDINGS:  Osteopenia.  No acute fracture or dislocation.  Moderate to advanced glenohumeral and mild AC joint osteoarthritis.  Nonspecific soft tissue calcification projects over the shoulder.  Impression: As above    Electronically signed by: Marce Narvaez  Date:    12/07/2024  Time:    21:58  X-Ray Humerus 2 View Left  Narrative: EXAMINATION:  XR HUMERUS 2 VIEW LEFT    CLINICAL HISTORY:  Pain in left arm    TECHNIQUE:  Two views    COMPARISON:  None    FINDINGS:  Osteopenia.  No acute fracture or dislocation.  Moderate glenohumeral osteoarthritis.  Nonspecific calcifications project over the soft tissues of proximal arm.  Impression: As above    Electronically signed by: Marce Narvaez  Date:    12/07/2024  Time:    21:55  X-Ray Chest AP Portable  Narrative: EXAMINATION:  XR CHEST AP PORTABLE    CLINICAL HISTORY:  Unspecified fall, initial encounter    TECHNIQUE:  Single frontal view of the chest was  performed.    COMPARISON:  02/05/2024    FINDINGS:  Enlarged cardiac silhouette.  Chronic interstitial changes.  No focal consolidation.  Impression: No acute findings    Electronically signed by: Marce Narvaez  Date:    12/07/2024  Time:    21:52  X-Ray Hips Bilateral 2 View Incl AP Pelvis  Narrative: EXAMINATION:  XR HIPS BILATERAL 2 VIEW INCL AP PELVIS    CLINICAL HISTORY:  Pain in unspecified hip    TECHNIQUE:  AP view of the pelvis and frogleg lateral views of both hips were performed.    COMPARISON:  01/12/2024    FINDINGS:  Osteopenia.  No acute fracture or dislocation.  Mild bilateral hip osteoarthritis.  Moderate pubic symphysis degeneration.  Impression: No acute findings.    Electronically signed by: Marce Narvaez  Date:    12/07/2024  Time:    21:50  CT HEAD FOR STROKE  Narrative: EXAMINATION:  CT HEAD FOR STROKE    CLINICAL HISTORY:  Neuro deficit, acute, stroke suspected;    TECHNIQUE:  Low dose axial images were obtained through the head.  Coronal and sagittal reformations were also performed. Contrast was not administered.    COMPARISON:  01/12/2024.    FINDINGS:  Patchy and confluent periventricular white matter hypoattenuation suggestive of chronic microvascular ischemic change, though nonspecific.  Mild generalized cerebral volume loss with ex vacuo dilation of the ventricles and sulci.  Partially empty sella configuration as seen previously.    No evidence of acute territorial infarct, hemorrhage, mass effect, or midline shift.    Ventricles are normal in size and configuration.    No displaced calvarial fracture.    Visualized paranasal sinuses and mastoid air cells are essentially clear.  Impression: No CT evidence of acute intracranial abnormality. If the patient has an acute, focal neurological deficit, MRI of the brain may be indicated.    Generalized cerebral volume loss and chronic microvascular ischemic changes.    Electronically signed by: Roger Vazquez  MD  Date:    12/07/2024  Time:    20:06         ASSESSMENT & PLAN:        Dysarthria   Hypertensive emergency   TIA   Vertigo       Plan:   Dysarthria/speech abnormality could likely be TIA versus hypertensive emergency.    Recommend continuing with Eliquis, adding aspirin 81 mg daily and Lipitor 40 mg nightly for stroke prevention.    MRI brain was negative for acute pathology.  MR angio head was done was nondiagnostic due to motion.  Consider repeating MR angio head and neck.  Risk factor stratification with 2D echo, hemoglobin A1c and lipid panel.    Slowly normalize blood pressure.    Patient is on multiple sedating medications that could be contributing to intermittent episodes of daytime sleepiness.  Might need adjustment in these medication.  If symptoms continue, outpatient sleep study and LTM EEG is recommended.  Outpatient follow-up with ENT for positional vertigo.    PT OT and speech therapy evaluate and treat.    Bilateral lower extremity swelling and erythema-workup and management per primary team.  Outpatient neurology follow-up          Thank you kindly for including us in the care of this patient. Please do not hesitate to contact us with any questions.         Denton Noonan MD  Neurology/vascular Neurology  Date of Service: 12/08/2024  10:27 AM    --------------------------------------------------------------------------------------------------------------------------------------------------------------------------------------------------------------------------------------------------------------  Please note: This note was transcribed using voice recognition software. Because of this technology there are often uinintended grammatical, spelling, and other transcription errors. Please disregard these errors.

## 2024-12-08 NOTE — PLAN OF CARE
Problem: Occupational Therapy  Goal: Occupational Therapy Goal  Description: Goals to be met by: 1/8/2024     Patient will increase functional independence with ADLs by performing:    UE Dressing with Modified [place].  LE Dressing with Supervision and Assistive Devices as needed.  Grooming while standing at sink with Supervision.  Toileting from bedside commode with Supervision for hygiene and clothing management.   Supine to sit with Modified [place].  Step transfer with Supervision  Toilet transfer to toilet with Supervision.    Outcome: Progressing

## 2024-12-08 NOTE — CARE UPDATE
12/08/24 0817   Patient Assessment/Suction   Level of Consciousness (AVPU)   (resting)   Respiratory Effort Slight;Labored   Expansion/Accessory Muscles/Retractions no use of accessory muscles   All Lung Fields Breath Sounds Anterior:   ELIAS Breath Sounds diminished   RUL Breath Sounds wheezes, expiratory   Rhythm/Pattern, Respiratory labored   Cough Frequency no cough   PRE-TX-O2   Device (Oxygen Therapy) nasal cannula   $ Is the patient on Low Flow Oxygen? Yes   Flow (L/min) (Oxygen Therapy) 2   SpO2 97 %   Pulse Oximetry Type Intermittent   $ Pulse Oximetry - Multiple Charge Pulse Oximetry - Multiple   Pulse 75   Resp 18   Education   $ Education 15 min;Oxygen   Tobacco Cessation Intervention   Do you use any type of tobacco product? Yes   Are you interested in quitting use of tobacco products?   (unable to answer.)   $ Smoking Cessation Charges Smoking Cessation - Intermediate (Non-CTTS)   Respiratory Evaluation   $ Care Plan Tech Time 15 min   $ Respiratory Evaluation Complete   Evaluation For New Orders   Admitting Diagnosis cva   Pulmonary Diagnosis asthma, copd   Home Oxygen   Has Home Oxygen? No   Home Aerosol, MDI, DPI, and Other Treatments/Therapies   Home Respiratory Therapy Per Patient/Review of Chart No   Oxygen Care Plan   Oxygen Care Plan Per Protocol   SPO2 Goal (%) 92% non-cardiac   Rationale SpO2 is <92% (Non-cardiac Pt.)   Bronchodilator Care Plan   Bronchodilator Care Plan N/A   Rationale Other  (patient is wheezing and has copd and asthma.)   Atelectasis Care Plan   Rationale No Rational Found   Airway Clearance Care Plan   Rationale No rationale found

## 2024-12-08 NOTE — HPI
"The patient is a 69-year-old  female with a history of hypertension, CKD-4, atrial fibrillation (on Eliquis), morbid obesity, COPD, hyperlipidemia, CAD, aortic stenosis/regurgitation, stroke, and history of systolic/diastolic congestive heart failure.  Her last echo performed in 2022 showed, per Dr. Wright:  ·   "The estimated ejection fraction is 40%. There is a anterior apical segment that is hypo to akinetic with a wall motion abnormality  · Grade II left ventricular diastolic dysfunction. Mean left atrial pressure 20 mm Hg. Mild mitral annular calcification  · There are segmental left ventricular wall motion abnormalities.  · Normal right ventricular size with normal right ventricular systolic function.  · Moderate left atrial enlargement.  · Moderate aortic regurgitation.  · There is moderate aortic valve stenosis.  · Aortic valve area is 1.55 cm2; peak velocity is 2.88 m/s; mean gradient is 15 mmHg.  · Moderate mitral regurgitation.  · There is mild pulmonary hypertension.  · Mild tricuspid regurgitation.  · Elevated central venous pressure (15 mmHg).  · The estimated PA systolic pressure is 47 mmHg."    Please note the obtaining history from the patient is impossible, given her current mental state.      The patient lives home with her  who has visual and hearing impediment.  Thus, the majority of the history is obtained from the ER physician, records, and 2 adult children at bedside.    Her children at bedside chair concern that the patient takes her prescribed narcotics inappropriately.  The patient was brought in tonight because of recurrent falls.  Over the last 2-3 weeks, the patient had complained that she was "off balance" and had fallen 7-8 times.    Around the 630 p.m., family worse talking to the patient on the phone apparently and knows that she had slurred speech and that she was "stuck on the Ottoman inked".  Somehow, the patient reached the neighbor and by the time the children got " "to the house she was sitting in her recliner (has been moved from the dining room chair, presumably with the assistance of the neighbor).  Thus she was conducted to the ER.      While here she was afebrile but hypertensive at 224/109 and this came down to 146/108.  NIH was 1.    Workup showed that head CT for stroke was negative.  She had multiple imaging studies including bilateral hip x-rays with pelvis, chest x-ray, left humerus, left shoulder, and CT cervical spine without contrast, all of which were unrevealing.    MRI brain without contrast showed (with an addenda, per radiologist Dr. Narvaez):    "Impression:     No acute ischemic process. Moderate to advanced periventricular and deep white matter changes of chronic microvascular ischemia. GRE sequence demonstrates punctate focus of susceptibility artifact in the right cerebellum and left basal ganglia representing microhemorrhage or calcification."    Furthermore, the patient had an MRI brain without contrast which showed, per radiologist Dr. Burnett:    Impression:     "Examination is significantly limited secondary to motion with apparent absence of flow related enhancement within the left middle cerebral artery branches, left vertebral artery, and the left posterior cerebral artery.  This is most likely artifactual.     CTA may be attempted for further evaluation."    She was evaluated by vascular Neurology and my understanding is that she was not considered an intervention candidate.  Additionally, as her last known normal was not precisely known (as she is already fully anticoagulated with Eliquis), she was not considered to be a tenecteplase candidate, either.  She is admitted for further diagnosis, treatment, and care.  "

## 2024-12-08 NOTE — ED PROVIDER NOTES
Encounter Date: 2024       History     Chief Complaint   Patient presents with    Headache     Fell twice today , slurred speech started at 1830     HPI  69-year-old woman with a history of COPD, on Eliquis, presents for evaluation of 2 falls today.  She reports she fell because her  was crowding her.  She denies hitting her head.  She denies headache changes in vision chest pain shortness of breath belly pain nausea or vomiting change in bowel or bladder habits.  She reports bilateral hip pain.  She reports she woke up at 3:30 a.m. and put in her teeth.  She reports she put on too much adhesive.  She reports this change the way that she talks.      Review of patient's allergies indicates:  No Known Allergies  Past Medical History:   Diagnosis Date    Anemia, unspecified     Arthritis     Asthma     COPD (chronic obstructive pulmonary disease)     Encounter for blood transfusion     Hypertension     Obese body habitus     Wound infection 2019    Right knee     Past Surgical History:   Procedure Laterality Date    ANGIOGRAM, CORONARY, WITH LEFT HEART CATHETERIZATION N/A 2022    Procedure: Angiogram, Coronary, with Left Heart Cath;  Surgeon: Jorge Tran MD;  Location: [facility] CATH/EP LAB;  Service: Cardiology;  Laterality: N/A;     SECTION      ECHOCARDIOGRAM,TRANSESOPHAGEAL N/A 2023    Procedure: Transesophageal echo (MARIANO) intra-procedure log documentation;  Surgeon: Araceli Sue Diagnostic;  Location: [facility] EP LAB;  Service: Cardiology;  Laterality: N/A;    INCISION AND DRAINAGE OF HEMATOMA Left 2024    Procedure: INCISION AND DRAINAGE, HEMATOMA;  Surgeon: Bryson Huerta MD;  Location: [facility] OR;  Service: Orthopedics;  Laterality: Left;    JOINT REPLACEMENT      Knee    KNEE ARTHROPLASTY Right 2019    Procedure: ARTHROPLASTY, KNEE;  Surgeon: Delio Chung MD;  Location: [facility] OR;  Service: Orthopedics;  Laterality: Right;  anesthesia:  general and block     REPLACEMENT OF WOUND VACUUM-ASSISTED CLOSURE DEVICE Right 9/12/2019    Procedure: REPLACEMENT, WOUND VAC;  Surgeon: Delio Chung MD;  Location: [facility] OR;  Service: Orthopedics;  Laterality: Right;    TONSILLECTOMY      TREATMENT OF CARDIAC ARRHYTHMIA N/A 8/31/2023    Procedure: Cardioversion or Defibrillation;  Surgeon: PJ Betancourt MD;  Location: [facility] EP LAB;  Service: Cardiology;  Laterality: N/A;  AF, MARIANO, DCCV, MAC, EH, 3 Prep    VENTRICULOGRAM, LEFT  12/23/2022    Procedure: Ventriculogram, Left;  Surgeon: Jorge Tran MD;  Location: [facility] CATH/EP LAB;  Service: Cardiology;;     Family History   Problem Relation Name Age of Onset    Alzheimer's disease Mother       Social History     Tobacco Use    Smoking status: Every Day     Current packs/day: 0.25     Average packs/day: 0.5 packs/day for 49.9 years (23.7 ttl pk-yrs)     Types: Cigarettes     Start date: 1975     Passive exposure: Current    Smokeless tobacco: Never    Tobacco comments:     Pt states she is a 46 year smoker, smokes around 5-8 cigarettes per day. Interested in nicotine replacement while admitted. Information given on outpatient smoking cessation.   Substance Use Topics    Alcohol use: Yes     Comment: RARELY    Drug use: Never     Review of Systems   All other systems reviewed and are negative.      Physical Exam     Initial Vitals [12/07/24 1944]   BP Pulse Resp Temp SpO2   124/86 82 16 98.1 °F (36.7 °C) 98 %      MAP       --         Physical Exam    Nursing note and vitals reviewed.  Constitutional: She appears well-developed. She is not diaphoretic.   HENT:   Head: Normocephalic and atraumatic.   Eyes: Right eye exhibits no discharge. Left eye exhibits no discharge.   Neck: No tracheal deviation present.   Cardiovascular:  Normal rate, regular rhythm and intact distal pulses.           Pulmonary/Chest: Breath sounds normal. No stridor. No respiratory distress.   Abdominal: Abdomen is soft. There is no abdominal tenderness.  There is no rebound.   Musculoskeletal:         General: No tenderness.     Neurological: She is alert and oriented to person, place, and time.   Face is symmetric, she has bilateral upper extremity drift but has equal 5/5  strength, she is able to lift and hold both legs off the bed, she has 5/5 dorsi and plantar flexion   Skin: Skin is warm and dry.         ED Course   Procedures  Labs Reviewed   CBC W/ AUTO DIFFERENTIAL - Abnormal       Result Value    WBC 7.30      RBC 3.37 (*)     Hemoglobin 8.9 (*)     Hematocrit 31.0 (*)     MCV 92      MCH 26.4 (*)     MCHC 28.7 (*)     RDW 18.6 (*)     Platelets 259      MPV 9.5      Immature Granulocytes 0.7 (*)     Gran # (ANC) 6.0      Immature Grans (Abs) 0.05 (*)     Lymph # 0.6 (*)     Mono # 0.5      Eos # 0.1      Baso # 0.03      nRBC 0      Gran % 81.5 (*)     Lymph % 8.5 (*)     Mono % 7.1      Eosinophil % 1.8      Basophil % 0.4      Differential Method Automated     COMPREHENSIVE METABOLIC PANEL - Abnormal    Sodium 135 (*)     Potassium 4.1      Chloride 104      CO2 24      Glucose 90      BUN 24 (*)     Creatinine 2.2 (*)     Calcium 8.6 (*)     Total Protein 6.5      Albumin 3.7      Total Bilirubin 0.8      Alkaline Phosphatase 141 (*)     AST 13      ALT 14      eGFR 23.4 (*)     Anion Gap 7 (*)    LIPID PANEL - Abnormal    Cholesterol 100 (*)     Triglycerides 90      HDL 38 (*)     LDL Cholesterol 44.0 (*)     HDL/Cholesterol Ratio 38.0      Total Cholesterol/HDL Ratio 2.6      Non-HDL Cholesterol 62     ISTAT CREATININE - Abnormal    POC Creatinine 2.2 (*)     Sample VENOUS     PROTIME-INR    Prothrombin Time 12.3      INR 1.1     TSH    TSH 1.814     TROPONIN I HIGH SENSITIVITY   TROPONIN I HIGH SENSITIVITY    Troponin I High Sensitivity 12.1     URINALYSIS, REFLEX TO URINE CULTURE   COMPREHENSIVE METABOLIC PANEL   MAGNESIUM   CBC W/ AUTO DIFFERENTIAL   AMMONIA   DRUG SCREEN PANEL, URINE EMERGENCY   POCT GLUCOSE    POCT Glucose 98     POCT  GLUCOSE MONITORING CONTINUOUS          Imaging Results               MRA Brain without contrast (Final result)  Result time 12/07/24 23:29:39      Final result by Justin Burnett MD (12/07/24 23:29:39)                   Impression:      Examination is significantly limited secondary to motion with apparent absence of flow related enhancement within the left middle cerebral artery branches, left vertebral artery, and the left posterior cerebral artery.  This is most likely artifactual.    CTA may be attempted for further evaluation.    This report was flagged in Epic as abnormal.      Electronically signed by: Justin Burnett MD  Date:    12/07/2024  Time:    23:29               Narrative:    EXAMINATION:  MRA BRAIN WITHOUT CONTRAST    CLINICAL HISTORY:  Neuro deficit, acute, stroke suspected;    TECHNIQUE:  Per separate acquisition, Non-contrast 3-D time-of-flight intracranial MR angiography was performed through the [place] with MIP reformatting.    COMPARISON:  MRI of the brain dated 12/07/2024 at 22:20 hours.    FINDINGS:  There are significant motion limitations to the examination.    There is apparent loss of flow related enhancement within the left vertebral artery.  There is also similar loss of flow related enhancement within the left M2 branches.  There is absence of flow related enhancement within the left posterior cerebral artery.    There is a large PCOM supplying the right posterior cerebral artery.                                       MRI Brain Without Contrast (Edited Result - FINAL)  Result time 12/07/24 23:17:30      Addendum (preliminary) 1 of 1 by Marce Narvaez MD (12/07/24 23:17:30)      Note that the examination is limited by patient motion artifact.  Upon further review, linear focus of apparent restricted diffusion on DWI with corresponding ADC abnormality in the left cerebellum representing acute infarct versus artifact.  Additional punctate focus of apparent restricted  diffusion in the right frontal lobe representing infarct versus artifact.  If clinically warranted, recommend repeat examination when patient is able to tolerate procedure.    COMMUNICATION  The key critical information above was relayed directly by Marce Narvaez by Epic secure chat (with acknowledgement) to Renaldo Rodriguez MD on 12/7/2024 at 23:14.      Electronically signed by: Marce Narvaez  Date:    12/07/2024  Time:    23:17                 Final result by Marce Narvaez MD (12/07/24 23:06:54)                   Impression:      No acute ischemic process. Moderate to advanced periventricular and deep white matter changes of chronic microvascular ischemia. GRE sequence demonstrates punctate focus of susceptibility artifact in the right cerebellum and left basal ganglia representing microhemorrhage or calcification.      Electronically signed by: Marce Narvaez  Date:    12/07/2024  Time:    23:06               Narrative:    EXAMINATION:  MRI BRAIN WITHOUT CONTRAST    CLINICAL HISTORY:  Stroke, follow up;.    TECHNIQUE:  Multiplanar multisequence MR imaging of the brain was performed without contrast.    COMPARISON:  None    FINDINGS:  Motion artifact.    Intracranial compartment:    Ventricles and sulci are normal in size for age without evidence of hydrocephalus. No extra-axial blood or fluid collections.    No acute ischemic process.  Moderate to advanced periventricular and deep white matter changes of chronic microvascular ischemia.  GRE sequence demonstrates punctate focus of susceptibility artifact in the right cerebellum and left basal ganglia representing microhemorrhage or calcification.    Normal vascular flow voids are preserved.    Skull/extracranial contents (limited evaluation): Bone marrow signal intensity is normal.                                       CT Cervical Spine Without Contrast (Final result)  Result time 12/07/24 22:05:15      Final result by Marce Narvaez  MD (12/07/24 22:05:15)                   Impression:      No acute trauma.      Electronically signed by: Marce Narvaez  Date:    12/07/2024  Time:    22:05               Narrative:    EXAMINATION:  CT CERVICAL SPINE WITHOUT CONTRAST    CLINICAL HISTORY:  Neck trauma (Age >= 65y);    TECHNIQUE:  Low dose axial images, sagittal and coronal reformations were performed though the cervical spine.  Contrast was not administered.    COMPARISON:  01/12/2024    FINDINGS:  Reversal of the normal cervical lordosis.  Osteopenia.  No detectable acute fracture.  Minimal anterolisthesis at C2-3.  Moderate to advanced multilevel spondylosis.  Up to moderate central canal stenosis.  Severe bilateral foraminal stenosis at multiple levels.                                       X-Ray Shoulder Trauma Left (Final result)  Result time 12/07/24 21:58:07      Final result by Marce Narvaez MD (12/07/24 21:58:07)                   Impression:      As above      Electronically signed by: Marce Narvaez  Date:    12/07/2024  Time:    21:58               Narrative:    EXAMINATION:  XR SHOULDER TRAUMA 3 VIEW LEFT    CLINICAL HISTORY:  Pain in left shoulder    TECHNIQUE:  Three views of the left shoulder were performed.    COMPARISON  None    FINDINGS:  Osteopenia.  No acute fracture or dislocation.  Moderate to advanced glenohumeral and mild AC joint osteoarthritis.  Nonspecific soft tissue calcification projects over the shoulder.                                       X-Ray Humerus 2 View Left (Final result)  Result time 12/07/24 21:55:17      Final result by Marce Narvaez MD (12/07/24 21:55:17)                   Impression:      As above      Electronically signed by: Marce Narvaez  Date:    12/07/2024  Time:    21:55               Narrative:    EXAMINATION:  XR HUMERUS 2 VIEW LEFT    CLINICAL HISTORY:  Pain in left arm    TECHNIQUE:  Two views    COMPARISON:  None    FINDINGS:  Osteopenia.  No acute fracture  or dislocation.  Moderate glenohumeral osteoarthritis.  Nonspecific calcifications project over the soft tissues of proximal arm.                                       X-Ray Chest AP Portable (Final result)  Result time 12/07/24 21:52:09      Final result by Marce Narvaez MD (12/07/24 21:52:09)                   Impression:      No acute findings      Electronically signed by: Marce Narvaez  Date:    12/07/2024  Time:    21:52               Narrative:    EXAMINATION:  XR CHEST AP PORTABLE    CLINICAL HISTORY:  Unspecified fall, initial encounter    TECHNIQUE:  Single frontal view of the chest was performed.    COMPARISON:  02/05/2024    FINDINGS:  Enlarged cardiac silhouette.  Chronic interstitial changes.  No focal consolidation.                                       X-Ray Hips Bilateral 2 View Incl AP Pelvis (Final result)  Result time 12/07/24 21:50:55      Final result by Marce Narvaez MD (12/07/24 21:50:55)                   Impression:      No acute findings.      Electronically signed by: Marce Narvaez  Date:    12/07/2024  Time:    21:50               Narrative:    EXAMINATION:  XR HIPS BILATERAL 2 VIEW INCL AP PELVIS    CLINICAL HISTORY:  Pain in unspecified hip    TECHNIQUE:  AP view of the pelvis and frogleg lateral views of both hips were performed.    COMPARISON:  01/12/2024    FINDINGS:  Osteopenia.  No acute fracture or dislocation.  Mild bilateral hip osteoarthritis.  Moderate pubic symphysis degeneration.                                       CT HEAD FOR STROKE (Final result)  Result time 12/07/24 20:06:35      Final result by Roger Vazquez MD (12/07/24 20:06:35)                   Impression:      No CT evidence of acute intracranial abnormality. If the patient has an acute, focal neurological deficit, MRI of the brain may be indicated.    Generalized cerebral volume loss and chronic microvascular ischemic changes.      Electronically signed by: Roger Vazquez  MD  Date:    12/07/2024  Time:    20:06               Narrative:    EXAMINATION:  CT HEAD FOR STROKE    CLINICAL HISTORY:  Neuro deficit, acute, stroke suspected;    TECHNIQUE:  Low dose axial images were obtained through the head.  Coronal and sagittal reformations were also performed. Contrast was not administered.    COMPARISON:  01/12/2024.    FINDINGS:  Patchy and confluent periventricular white matter hypoattenuation suggestive of chronic microvascular ischemic change, though nonspecific.  Mild generalized cerebral volume loss with ex vacuo dilation of the ventricles and sulci.  Partially empty sella configuration as seen previously.    No evidence of acute territorial infarct, hemorrhage, mass effect, or midline shift.    Ventricles are normal in size and configuration.    No displaced calvarial fracture.    Visualized paranasal sinuses and mastoid air cells are essentially clear.                                       Medications   nicotine 14 mg/24 hr 1 patch (has no administration in time range)   morphine injection 2 mg (2 mg Intravenous Given 12/7/24 2138)   Tdap vaccine injection 0.5 mL (0.5 mLs Intramuscular Given 12/8/24 0032)   sodium chloride 0.9% bolus 250 mL 250 mL (0 mLs Intravenous Stopped 12/8/24 0134)     Medical Decision Making  69-year-old male with a multiple falls today presents with slurred speech, she associates this would be about of adhesive she has done her dentures, she is complaining of hip pain, her vital signs are within normal limits, on exam she does have some speech changes, her face appears symmetric to me, she has bilateral upper extremity drift but has equal  strength, does not have any lower extremity deficits, she has a large skin tear on her left upper arm    Tetanus updated she looks dry on exam I gave her a small fluid bolus, MRI with possible left cerebellar infarct versus artifact, MRA with possible left-sided vascular abnormalities.  I discussed these with tele  stroke.  We are of the shared opinion that the MRI findings are more likely to be true as they fit her symptoms.  The MRA findings do not seem congruent with her symptoms as she does not have a focal neurologic deficit besides some slurred speech.  On further anything family reports she has had increasing falls over the last 1-2 weeks as well as episodes of intermittent slurred speech over the same time.  Additional history also revealed that her last seen normal was at 3:00 a.m. discussed MRI findings with tele stroke who said she has not thrombectomy candidate based on overall picture.  I agree with this.    Amount and/or Complexity of Data Reviewed  Independent Historian: caregiver  Labs: ordered. Decision-making details documented in ED Course.  Radiology: ordered and independent interpretation performed. Decision-making details documented in ED Course.     Details: I do not appreciate acute intracranial hemorrhage  ECG/medicine tests: ordered and independent interpretation performed.     Details: I do not appreciate STEMI on my independent interpretation of her EKG, it isn't irregular rhythm consistent with atrial fibrillation    Risk  Prescription drug management.  Parenteral controlled substances.  Decision regarding hospitalization.               ED Course as of 12/08/24 0346   Sat Dec 07, 2024   2056     Thrombolysis Candidate? No, Out of window - Symptom onset > 4.5 hours    Delays to Thrombolysis?  No   [IC]   2101 Not a thrombolytic candidate per tele stroke, recommends MRI and MRA brain [IC]      ED Course User Index  [IC] Renaldo Rodriguez MD                           Clinical Impression:  Final diagnoses:  [R29.818] Acute focal neurological deficit  [W19.XXXA] Fall  [M25.559] Hip pain  [M25.512] Left shoulder pain  [M79.602] Left arm pain          ED Disposition Condition    Admit Stable                Renaldo Rodriguez MD  12/08/24 0346

## 2024-12-08 NOTE — ASSESSMENT & PLAN NOTE
Most likely due to IV morphine lingering in her system (given her degree of renal impairment)  However, family note concern for possible inappropriate medication UA shows by patient  Discussed with family that even if she takes her medications appropriately, it could be too much for her (given her COPD, renal failure, obesity, etc.); for now:  Expand workup to obtain ammonia level, VBG, and drug screen

## 2024-12-08 NOTE — PT/OT/SLP EVAL
Speech Language Pathology  SLP Evaluate and treat    Evaluation     Patient Name:  Iris Mueller   MRN:  [id number]  Admitting Diagnosis: Acute focal neurological deficit [R29.818]  Current length of stay: 0   Expected Discharge:   not yet established.  Admitting Diet: Food:  Puree       Recommendations:     Speech Therapy discharge recommendations: GI evaluation if not completed in acute for suspected LERD   General Recommendations: Follow for diet tolerance with upgrade/downgrade as needed  Diet recommendations:  Solid Diet Level: Soft & Bite Sized Diet - IDDSI Level 6  Aspiration Precautions: Supervision required to ensure awake and alert due to varying degrees  Whole in liquid  Standard Precautions  General Precautions: General Precautions: fall   Communication strategies:  none    Assessment:       SPEECH THERAPY ASSESSMENT: Speech Therapy consult received on: today for evaluation of SLP Evaluate and treat    due to cerebrovascular accident (CVA) pathway. Imaging with no acute findings with final diagnosis of acute focal neurological deficit.     Ted-pharyngeal swallow appears intact, however the patient with definite s/s of esophageal dysphagia with suspected Laryngopharyngeal reflux disease (LERD). Transient dysarthria and cognitive deficits observed and reported for some time which appear to coincide with medications especially pain. Speech Therapy not indicated due to transient nature of deficits . Will follow for diet tolerance and education re: reflux precautions.  Recommend  Gastroenterologist (GI) consult.    Recommend Soft Bite Sized, IDDSI LEVEL 6 textures with thin liquids (IDDSI 0). Medications whole with liquid.  Diet recs with supervision required at start of meal due to varying degrees of level of alertness;  communicated to patient's nurse Morgan @ [phone] in person.       History of Present Illness Relevant to Speech Therapy Evaluation     Iris Mueller is a 69 y.o. female  "with a chief complaint of Headache (Fell twice today , slurred speech started at 1830). Per HPI  history of hypertension, CKD-4, atrial fibrillation (on Eliquis; status post cardioversion in August 2023), morbid obesity, COPD, hyperlipidemia, CAD, aortic stenosis/regurgitation, stroke, and history of systolic/diastolic congestive heart failure. Please note the obtaining history from the patient is impossible, given her current mental state. The majority of the history is obtained from the ER physician, records, and 2 adult children at bedside.      The patient lives at home with her  who has a visual and hearing impediment.  Her children at bedside share concern that the patient takes her prescribed narcotics inappropriately.       The patient was brought in tonight because of recurrent falls.  Over the last 2-3 weeks, the patient had complained that she was "off balance" and had fallen 7-8 times.     Around the 6:30 PM this evening family had apparently been talking to the patient on the phone and noted that she had slurred speech. The patient reported that she was "stuck on the [place]".       Workup showed a creatinine of 2.2 which is at baseline.  TSH was normal as was INR.  She had no leukocytosis.  LDL was 44.  Troponin was negative.  Head CT for stroke was negative.       She had multiple imaging studies including bilateral hip x-rays with pelvis, chest x-ray, left humerus, left shoulder, and CT cervical spine without contrast, all of which were unrevealing.     MRI brain without contrast showed (with an addenda, per radiologist Dr. Narvaez):     "Impression:     No acute ischemic process. Moderate to advanced periventricular and deep white matter changes of chronic microvascular ischemia. GRE sequence demonstrates punctate focus of susceptibility artifact in the right cerebellum and left basal ganglia representing microhemorrhage or calcification....     Note that the examination is limited by patient " "motion artifact. Upon further review, linear focus of apparent restricted diffusion on DWI with corresponding ADC abnormality in the left cerebellum representing acute infarct versus artifact. Additional punctate focus of apparent restricted diffusion in the right frontal lobe representing infarct versus artifact. If clinically warranted, recommend repeat examination when patient is able to tolerate procedure."      Furthermore, the patient had an MRI brain without contrast which showed, per radiologist Dr. Burnett:     "Impression:     Examination is significantly limited secondary to motion with apparent absence of flow related enhancement within the left middle cerebral artery branches, left vertebral artery, and the left posterior cerebral artery.  This is most likely artifactual.     CTA may be attempted for further evaluation."  . Additional medical history relevant to Speech Therapy evaluation include  COPD (2020) and CKD (2019). EGD:      LAST PROCEDURE: * No surgery found *    .     DRUG SCREEN PANEL: Presumptive positive for  opiates  Screen:   Screen:         INITIAL VALUES MOST RECENT       Nasal cannula, flow   L/min, O2 Sats: (!) 91 %   98.1 °F (36.7 °C) 97.8 °F (36.6 °C)    Pulse: 82 Pulse: 77   Respiratory Rate:   Respiratory Rate:20    WBC 7.30   WBC 6.02    Hgb: (!) 8.9 Hgb: (!) 8.7   Albumin 3.7 Albumin (!) 3.4   Creatinine: (!) 2.2 Creatinine: (!) 2.1     IMAGING:   BRAIN:   CHEST:   MRA Brain without contrast  Narrative: EXAMINATION:  MRA BRAIN WITHOUT CONTRAST    CLINICAL HISTORY:  Neuro deficit, acute, stroke suspected;  FINDINGS:  There are significant motion limitations to the examination.    There is apparent loss of flow related enhancement within the left vertebral artery.  There is also similar loss of flow related enhancement within the left M2 branches.  There is absence of flow related enhancement within the left posterior cerebral artery.    There is a large PCOM supplying the right " posterior cerebral artery.  Impression: Examination is significantly limited secondary to motion with apparent absence of flow related enhancement within the left middle cerebral artery branches, left vertebral artery, and the left posterior cerebral artery.  This is most likely artifactual.    CDate:    12/07/2024  Time:    23:29  MRI Brain Without Contrast  Addendum: Note that the examination is limited by patient motion artifact.  Upon  further review, linear focus of apparent restricted diffusion on DWI with  corresponding ADC abnormality in the left cerebellum representing acute  infarct versus artifact.  Additional punctate focus of apparent restricted  diffusion in the right frontal lobe representing infarct versus artifact.   If clinically warranted, recommend repeat examination when patient is able  to tolerate procedure.  FINDINGS:  Motion artifact.    Intracranial compartment:    Ventricles and sulci are normal in size for age without evidence of hydrocephalus. No extra-axial blood or fluid collections.    No acute ischemic process.  Moderate to advanced periventricular and deep white matter changes of chronic microvascular ischemia.  GRE sequence demonstrates punctate focus of susceptibility artifact in the right cerebellum and left basal ganglia representing microhemorrhage or calcification.    X-Ray Chest AP Portable  Narrative: EXAMINATION:    FINDINGS:  Enlarged cardiac silhouette.  Chronic interstitial changes.  No focal consolidation.  Impression: No acute findings    Electronically signed by: Marce Narvaez  Date:    12/07/2024  Time:    21:52      MODIFIED BARIUM SWALLOW STUDY:  None on file in Epic system      PRIOR SPEECH THERAPY:  None in Epic    PRIOR DIET:  Regular      SOCIAL HISTORY/HOBBIES: Ms. Mueller is  and lives in [place] [address].   PLOF:  driving  Occupation/Homemaking:   lives alone with . Was independent but with more and more falls.       SUBJECTIVE:      Iris Kilpatrick   present at the bedside. Upon entering the room Ms. Mueller was lying in bed. Head of bed elevated for therapy.  She was sleeping and not easily aroused even with maximum verbal/tactile prompts.      Objective:   Orientation:  Person:    WNL-able to recall personal info including medical history/course of hospitalization  Place:    WNL- [facility]  Time:    WNL- oriented to month/date/day/year/season  Situation:    WNL- all aspects of hospitalization including diagnosis and course of events    Cognition:  Varying degrees of cognitive observed which seem to coincide with medications. At best and baseline up until 2 to 3 months ago, patient with generally intact memory, attention and problem solving.     Visual-Spatial:  WFL- no deficits observed in conversation  Oral Musculature Evaluation       Bedside Swallow Eval:   Consistencies Assessed:  Thin liquids .  Puree .  Mixed consistencies .  Solids .      Oral Phase:   WFL  Prolonged mastication    Pharyngeal Phase:   Report of liquid reflux 2 to 3 minutes after liquid swallows  no overt clinical signs/symptoms of aspiration  no overt clinical signs/symptoms of pharyngeal dysphagia    Compensatory Strategies  None    Treatment: role of SLP results of evaluation including no indication for acute Speech Therapy. the patient and family  were receptive to the information. Recommend Gastroenterologist (GI) consult due to complaint of liquid reflux. Family in agreement.    Goals:   See comments following goals below for today's objectives:  Multidisciplinary Problems       SLP Goals          Problem: SLP    Goal Priority Disciplines Outcome   SLP Goal     SLP Progressing   Description: 1) The patient will progress from a IDDSI level 4 puree thin liquids level 0  to a IDDSI level 7 regular thin liquids level 0 diet with improved  improved endurance to complete a meal and no pulmonary compromise.                                 Plan:     Patient to be seen: 1 x/week for diet tolerance  Plan of Care expires:     Plan of Care reviewed with:  patient  SLP Follow-Up:         Discharge recommendations:  not indicated     Barriers to Discharge:  None    Time Tracking:     SLP Treatment Date: 12/08/24  Speech Start Time:  1038 (1355)  Speech Stop Time:   1052 (1415)  Speech Total Time (min):  Speech Total (min): 34 min    Billable Minutes: Eval 10 , Eval Swallow and Oral Function 9, Self Care/Home Management Training 15, and Total Time 34    12/08/2024

## 2024-12-08 NOTE — PT/OT/SLP EVAL
Occupational Therapy   Evaluation, tx    Name: Iris Mueller  MRN: [id number]  Admitting Diagnosis: CVA (cerebral vascular accident)  Recent Surgery: * No surgery found *    Diagnoses of Acute focal neurological deficit, Fall, Hip pain, Left shoulder pain, Left arm pain, Chest pain, and CVA (cerebral vascular accident) were pertinent to this visit.    Recommendations:     Discharge Recommendations: Moderate Intensity Therapy  Discharge Equipment Recommendations:  to be determined by next level of care  Barriers to discharge:  Decreased caregiver support    Assessment:     Iris Mueller is a 69 y.o. female with a medical diagnosis of CVA (cerebral vascular accident).  She presents with slurred speech, confusion, lethargy. Performance deficits affecting function: weakness, impaired endurance, impaired self care skills, impaired functional mobility, gait instability, impaired balance, impaired cognition, decreased coordination, decreased upper extremity function, decreased lower extremity function, decreased safety awareness, pain, impaired coordination, impaired cardiopulmonary response to activity, edema, impaired skin.      Rehab Prognosis: Good and Fair; patient would benefit from acute skilled OT services to address these deficits and reach maximum level of function.       Plan:     Patient to be seen 5 x/week to address the above listed problems via self-care/home management, therapeutic activities, therapeutic exercises  Plan of Care Expires: 01/08/25  Plan of Care Reviewed with: patient, spouse (niece)    Subjective     Chief Complaint: back pain, I have chronic back pain per pt report.  Patient/Family Comments/goals: per niece, we have lots of questions, and I am waiting to see the neurologist.    Occupational Profile:  Living Environment: pt lives with her spouse who has visual and hearing impairments in a [facility] with THE; family checks on them regularly; pt has t/s combo with a shower chair  and grab bars.  Previous level of function: Indep-Mod I with Rollator; Pt has been having numerous fall within last month per niece's report. She does most IADLs and drives. Spouse has been helping more lately her since falls. He does not drive.  Roles and Routines: HHPT. Wife.  Equipment Used at Home: shower chair, grab bar, rollator  Assistance upon Discharge: niece, daughter, neighbor, spouse(limited)    Pain/Comfort:  Pain Rating 1: 7/10  Location - Side 1: Bilateral  Location - Orientation 1: lower  Location 1: back  Pain Addressed 1: Reposition, Distraction, Nurse notified  Pain Rating Post-Intervention 1: 5/10    Patients cultural, spiritual, Worship conflicts given the current situation: no    Objective:     Communicated with: nurse prior to session.  Patient found HOB elevated with bed alarm, telemetry, PureWick, oxygen upon OT entry to room.    General Precautions: Standard, fall, pureed diet  Orthopedic Precautions: N/A  Braces: N/A  Respiratory Status: Nasal cannula, flow 2 L/min    Occupational Performance:    Bed Mobility:    Patient completed Rolling/Turning to Right with minimum assistance and with side rail  Patient completed Scooting/Bridging with minimum assistance and with side rail  Patient completed Supine to Sit with minimum assistance and with side rail  Patient completed Sit to Supine with moderate assistance    Functional Mobility/Transfers:  Patient completed Sit <> Stand Transfer with minimum assistance  with  rolling walker   Patient completed Toilet Transfer Step Transfer technique with minimum assistance with  rolling walker and bedside commode  Functional Mobility: ambulated few steps Min A with RW to BSC. Pt scooted long bed Min A prior to returning to supine.    Activities of Daily Living:  Feeding:  supervision . pureed  Grooming: stand by assistance washed face seated EOB; declined to brush teeth -stated she wanted to wait until after she ate her lunch.  Lower Body Dressing:  total assistance socks seated EOB  Toileting: maximal assistance clothes management. Pt performed hygiene standing with RW after voiding.    Cognitive/Visual Perceptual:  Cognitive/Psychosocial Skills:     -       Oriented to: Person, Place, Time, and partially to situation, pt new she had a fall but unaware of having a stroke   -       Follows Commands/attention:Inattentive and Follows one-step commands  -       Communication: dysarthria  -       Memory: Impaired STM and Poor immediate recall  -       Safety awareness/insight to disability: impaired   -       Mood/Affect/Coping skills/emotional control: Cooperative  Visual/Perceptual:      -Intact grossly    Physical Exam:  Balance:    -       sitting: good  dynamic: fair  standing: poor plus  dynamic: poor  Postural examination/scapula alignment:    -       Rounded shoulders  -       Forward head  Skin integrity: erythema BLE   Sensation:    -       Intact  Motor Planning:    -       intact  Dominant hand:    -       right  Upper Extremity Range of Motion:     -       Right Upper Extremity: WFL  -       Left Upper Extremity: WFL  Upper Extremity Strength:    -       Right Upper Extremity: WFL except shld 3-/5  -       Left Upper Extremity: WFL except shld 3-/5   Strength:    -       Right Upper Extremity: WFL  -       Left Upper Extremity: WFL  Fine Motor Coordination:    -       Intact  Gross motor coordination: slight wavering movement finger nose but accurate  Neurological:    -       jerky UE movement at rest      AMPAC 6 Click ADL:  AMPAC Total Score: 16    Treatment & Education:  Pt seen for safe self care  activities and fx mobility retraining as stated above.  All questions/concerns addressed within scope.  Call don't fall. Pt  acknowledged.  Family educated    Patient left HOB elevated with all lines intact, call button in reach, bed alarm on, and niece, spouse present    GOALS:   Multidisciplinary Problems       Occupational Therapy Goals           Problem: Occupational Therapy    Goal Priority Disciplines Outcome Interventions   Occupational Therapy Goal     OT, PT/OT Progressing    Description: Goals to be met by: 2024     Patient will increase functional independence with ADLs by performing:    UE Dressing with Modified [place].  LE Dressing with Supervision and Assistive Devices as needed.  Grooming while standing at sink with Supervision.  Toileting from bedside commode with Supervision for hygiene and clothing management.   Supine to sit with Modified [place].  Step transfer with Supervision  Toilet transfer to toilet with Supervision.                         History:     Past Medical History:   Diagnosis Date    Anemia, unspecified     Arthritis     Asthma     COPD (chronic obstructive pulmonary disease)     Encounter for blood transfusion     Hypertension     Obese body habitus     Wound infection 2019    Right knee         Past Surgical History:   Procedure Laterality Date    ANGIOGRAM, CORONARY, WITH LEFT HEART CATHETERIZATION N/A 2022    Procedure: Angiogram, Coronary, with Left Heart Cath;  Surgeon: Jorge Tran MD;  Location: [facility] CATH/EP LAB;  Service: Cardiology;  Laterality: N/A;     SECTION      ECHOCARDIOGRAM,TRANSESOPHAGEAL N/A 2023    Procedure: Transesophageal echo (MARIANO) intra-procedure log documentation;  Surgeon: Vikram, Araceli Diagnostic;  Location: [facility] EP LAB;  Service: Cardiology;  Laterality: N/A;    INCISION AND DRAINAGE OF HEMATOMA Left 2024    Procedure: INCISION AND DRAINAGE, HEMATOMA;  Surgeon: Bryson Huerta MD;  Location: [facility] OR;  Service: Orthopedics;  Laterality: Left;    JOINT REPLACEMENT      Knee    KNEE ARTHROPLASTY Right 2019    Procedure: ARTHROPLASTY, KNEE;  Surgeon: Delio Chung MD;  Location: [facility] OR;  Service: Orthopedics;  Laterality: Right;  anesthesia:  general and block    REPLACEMENT OF WOUND VACUUM-ASSISTED CLOSURE DEVICE Right 2019     Procedure: REPLACEMENT, WOUND VAC;  Surgeon: Delio Chung MD;  Location: [facility] OR;  Service: Orthopedics;  Laterality: Right;    TONSILLECTOMY      TREATMENT OF CARDIAC ARRHYTHMIA N/A 8/31/2023    Procedure: Cardioversion or Defibrillation;  Surgeon: PJ Betancourt MD;  Location: [facility] EP LAB;  Service: Cardiology;  Laterality: N/A;  AF, MARIANO, DCCV, MAC, EH, 3 Prep    VENTRICULOGRAM, LEFT  12/23/2022    Procedure: Ventriculogram, Left;  Surgeon: Jorge Tran MD;  Location: [facility] CATH/EP LAB;  Service: Cardiology;;       Time Tracking:     OT Date of Treatment: 12/08/24  OT Start Time: 1115 (1147)  OT Stop Time: 1122 (1210)  OT Total Time (min): 7 min+23=30    Billable Minutes:Evaluation 10  Self Care/Home Management 20  Total Time 30    12/8/2024   MALAISE

## 2024-12-08 NOTE — H&P
"  [facility] - Emergency Dept  Hospital Medicine  History & Physical    Patient Name: Iris Mueller  MRN: [id number]  Patient Class: IP- Inpatient  Admission Date: 12/7/2024  Attending Physician: Karen Bradley MD  Primary Care Provider: Elkin You MD    Patient seen at 2:05 AM on 12/08/2024.  History is obtained from family at bedside/ER physician/records  Subjective:     Principal Problem:CVA (cerebral vascular accident)    Chief Complaint:   Chief Complaint   Patient presents with    Headache     Fell twice today , slurred speech started at 1830      HPI: The patient is a 69-year-old  female with a history of hypertension, CKD-4, atrial fibrillation (on Eliquis; status post cardioversion in August 2023), morbid obesity, COPD, hyperlipidemia, CAD, aortic stenosis/regurgitation, stroke, and history of systolic/diastolic congestive heart failure.  Her last echo performed in 2022 showed, per Dr. Wright:  ·   "The estimated ejection fraction is 40%. There is a anterior apical segment that is hypo to akinetic with a wall motion abnormality  · Grade II left ventricular diastolic dysfunction. Mean left atrial pressure 20 mm Hg. Mild mitral annular calcification  · There are segmental left ventricular wall motion abnormalities.  · Normal right ventricular size with normal right ventricular systolic function.  · Moderate left atrial enlargement.  · Moderate aortic regurgitation.  · There is moderate aortic valve stenosis.  · Aortic valve area is 1.55 cm2; peak velocity is 2.88 m/s; mean gradient is 15 mmHg.  · Moderate mitral regurgitation.  · There is mild pulmonary hypertension.  · Mild tricuspid regurgitation.  · Elevated central venous pressure (15 mmHg).  · The estimated PA systolic pressure is 47 mmHg."    Please note the obtaining history from the patient is impossible, given her current mental state. The majority of the history is obtained from the ER physician, records, and 2 " "adult children at bedside.     The patient lives at home with her  who has a visual and hearing impediment.  Her children at bedside share concern that the patient takes her prescribed narcotics inappropriately.      The patient was brought in tonight because of recurrent falls.  Over the last 2-3 weeks, the patient had complained that she was "off balance" and had fallen 7-8 times.    Around the 6:30 PM this evening family had apparently been talking to the patient on the phone and noted that she had slurred speech. The patient reported that she was "stuck on the Ottoman".      Somehow, the patient reached the neighbors and by the time the children got to the house she was sitting in her recliner (has been moved from the dining room chair, presumably with the assistance of the neighbor).  Thus she was conducted to the ER.      While here she was afebrile but hypertensive at 224/109 and this came down to 146/108.  NIH was 1.    Workup showed a creatinine of 2.2 which is at baseline.  TSH was normal as was INR.  She had no leukocytosis.  LDL was 44.  Troponin was negative.  Head CT for stroke was negative.      She had multiple imaging studies including bilateral hip x-rays with pelvis, chest x-ray, left humerus, left shoulder, and CT cervical spine without contrast, all of which were unrevealing.    MRI brain without contrast showed (with an addenda, per radiologist Dr. Narvaez):    "Impression:     No acute ischemic process. Moderate to advanced periventricular and deep white matter changes of chronic microvascular ischemia. GRE sequence demonstrates punctate focus of susceptibility artifact in the right cerebellum and left basal ganglia representing microhemorrhage or calcification....    Note that the examination is limited by patient motion artifact. Upon further review, linear focus of apparent restricted diffusion on DWI with corresponding ADC abnormality in the left cerebellum representing acute " "infarct versus artifact. Additional punctate focus of apparent restricted diffusion in the right frontal lobe representing infarct versus artifact. If clinically warranted, recommend repeat examination when patient is able to tolerate procedure."     Furthermore, the patient had an MRI brain without contrast which showed, per radiologist Dr. Burnett:    "Impression:     Examination is significantly limited secondary to motion with apparent absence of flow related enhancement within the left middle cerebral artery branches, left vertebral artery, and the left posterior cerebral artery.  This is most likely artifactual.     CTA may be attempted for further evaluation."    She was evaluated by Vascular Neurology and she was not considered an intervention candidate.  Additionally, as her last known normal was not precisely known (and she is already fully anticoagulated with Eliquis), so she was not considered to be a tenecteplase candidate, either.      She is admitted for further diagnosis, treatment, and care.  At some point, she received 2 mg of IV Morphine, and now she is sleeping/obtunded (yet still easily maintaining her airway, albeit snoring), thus precluding a comprehensive interview/examination by myself.    Past Medical History:   Diagnosis Date    Anemia, unspecified     Arthritis     Asthma     COPD (chronic obstructive pulmonary disease)     Encounter for blood transfusion     Hypertension     Obese body habitus     Wound infection 2019    Right knee       Past Surgical History:   Procedure Laterality Date    ANGIOGRAM, CORONARY, WITH LEFT HEART CATHETERIZATION N/A 2022    Procedure: Angiogram, Coronary, with Left Heart Cath;  Surgeon: Jorge Tran MD;  Location: [facility] CATH/EP LAB;  Service: Cardiology;  Laterality: N/A;     SECTION      ECHOCARDIOGRAM,TRANSESOPHAGEAL N/A 2023    Procedure: Transesophageal echo (MARIANO) intra-procedure log documentation;  Surgeon: Provider, Dosc " Diagnostic;  Location: [facility] EP LAB;  Service: Cardiology;  Laterality: N/A;    INCISION AND DRAINAGE OF HEMATOMA Left 2/5/2024    Procedure: INCISION AND DRAINAGE, HEMATOMA;  Surgeon: Bryson Huerta MD;  Location: [facility] OR;  Service: Orthopedics;  Laterality: Left;    JOINT REPLACEMENT      Knee    KNEE ARTHROPLASTY Right 8/14/2019    Procedure: ARTHROPLASTY, KNEE;  Surgeon: Delio Chung MD;  Location: [facility] OR;  Service: Orthopedics;  Laterality: Right;  anesthesia:  general and block    REPLACEMENT OF WOUND VACUUM-ASSISTED CLOSURE DEVICE Right 9/12/2019    Procedure: REPLACEMENT, WOUND VAC;  Surgeon: Delio Chung MD;  Location: [facility] OR;  Service: Orthopedics;  Laterality: Right;    TONSILLECTOMY      TREATMENT OF CARDIAC ARRHYTHMIA N/A 8/31/2023    Procedure: Cardioversion or Defibrillation;  Surgeon: PJ Betancourt MD;  Location: [facility] EP LAB;  Service: Cardiology;  Laterality: N/A;  AF, MARIANO, DCCV, MAC, EH, 3 Prep    VENTRICULOGRAM, LEFT  12/23/2022    Procedure: Ventriculogram, Left;  Surgeon: Jorge Tran MD;  Location: [facility] CATH/EP LAB;  Service: Cardiology;;       Review of patient's allergies indicates:  No Known Allergies    Current Facility-Administered Medications on File Prior to Encounter   Medication    lidocaine (PF) 10 mg/ml (1%) injection 5 mg     Current Outpatient Medications on File Prior to Encounter   Medication Sig    amiodarone (PACERONE) 200 MG Tab Take 1 tablet (200 mg total) by mouth once daily.    amLODIPine (NORVASC) 2.5 MG tablet Take 1 tablet (2.5 mg total) by mouth every evening.    apixaban (ELIQUIS) 5 mg Tab Take 5 mg by mouth 2 (two) times daily.    atorvastatin (LIPITOR) 10 MG tablet Take 1 tablet (10 mg total) by mouth every evening.    betamethasone dipropionate 0.05 % cream Apply topically 2 (two) times daily.    buPROPion (WELLBUTRIN SR) 150 MG TBSR 12 hr tablet Take 1 tablet (150 mg total) by mouth 2 (two) times daily.    ergocalciferol (ERGOCALCIFEROL)  50,000 unit Cap Take 50,000 Units by mouth every 7 days.    FLUoxetine 40 MG capsule Take 1 capsule (40 mg total) by mouth once daily.    gabapentin (NEURONTIN) 300 MG capsule Take 1 capsule (300 mg total) by mouth 3 (three) times daily.    HYDROcodone-acetaminophen (NORCO) 5-325 mg per tablet Take 1 tablet by mouth every 6 (six) hours as needed for Pain.    HYDROcodone-acetaminophen (NORCO) 5-325 mg per tablet Take 1 tablet by mouth every 6 (six) hours as needed for Pain.    HYDROcodone-acetaminophen (NORCO) 5-325 mg per tablet Take 1 tablet by mouth every 6 (six) hours as needed for Pain.    loratadine (CLARITIN) 10 mg tablet Take 10 mg by mouth once daily.    methocarbamoL (ROBAXIN) 750 MG Tab Take 1 tablet (750 mg total) by mouth nightly as needed (muscle pain/spasms).    metoprolol succinate (TOPROL-XL) 25 MG 24 hr tablet Take 1 tablet (25 mg total) by mouth once daily.    mirtazapine (REMERON) 15 MG tablet Take 1 tablet (15 mg total) by mouth every evening. For sleep    mupirocin (BACTROBAN) 2 % ointment Apply topically 2 (two) times daily. To infected skin tear    traZODone (DESYREL) 50 MG tablet Take 2 tablets (100 mg total) by mouth nightly as needed for Insomnia.    triamcinolone acetonide 0.1% (KENALOG) 0.1 % cream Apply topically 2 (two) times daily.     Family History       Problem Relation (Age of Onset)    Alzheimer's disease Mother          Tobacco Use    Smoking status: Every Day     Current packs/day: At least 1 ppd     Average packs/day: 0.5 packs/day for 49.9 years (23.7 ttl pk-yrs)     Types: Cigarettes     Start date: 1975     Passive exposure: Current    Smokeless tobacco: Never    Tobacco comments:     Pt states she is a 46 year smoker, smokes around 5-8 cigarettes per day. Interested in nicotine replacement while admitted. Information given on outpatient smoking cessation.   Substance and Sexual Activity    Alcohol use: Yes     Comment: RARELY    Drug use: Never    Sexual activity: Not  Currently     Partners: Male     Review of Systems   Unable to perform ROS: Mental status change     Objective:     Vital Signs (Most Recent):  Temp: 98.1 °F (36.7 °C) (12/07/24 1944)  Pulse: 74 (12/08/24 0200)  Resp: 20 (12/08/24 0200)  BP: (!) 151/72 (12/08/24 0200)  SpO2: 96 % (12/08/24 0200) Vital Signs (24h Range):  Temp:  [98.1 °F (36.7 °C)] 98.1 °F (36.7 °C)  Pulse:  [74-96] 74  Resp:  [14-22] 20  SpO2:  [94 %-100 %] 96 %  BP: (124-224)/() 151/72     Weight: 99.8 kg (220 lb)  Body mass index is 38.97 kg/m².     Physical Exam  Constitutional:       General: She is not in acute distress.     Appearance: She is obese. She is not ill-appearing, toxic-appearing or diaphoretic.   HENT:      Head: Normocephalic and atraumatic.      Mouth/Throat:      Mouth: Mucous membranes are dry.   Eyes:      General: No scleral icterus.        Right eye: No discharge.         Left eye: No discharge.   Neck:      Comments: No retractions; no nuchal rigidity  Cardiovascular:      Rate and Rhythm: Regular rhythm.      Heart sounds: Murmur (3/6 systolic murmur) heard.      No friction rub. No gallop.   Pulmonary:      Effort: Pulmonary effort is normal.      Breath sounds: Normal breath sounds.   Abdominal:      General: Bowel sounds are normal. There is no distension.      Palpations: Abdomen is soft. There is no mass.      Tenderness: There is no abdominal tenderness.   Musculoskeletal:      Right lower leg: Edema present.      Left lower leg: Edema present.   Skin:     General: Skin is warm and dry.   Neurological:      Comments: Unable to assess   Psychiatric:      Comments: Unable to assess     Significant studies: Reviewed.  EKG shows, per my interpretation, AFib at 85 bpm with a QTC of 490  Assessment/Plan:     * CVA (cerebral vascular accident)/AMS (altered mental status), in the context of Essential hypertension/Tobacco dependency    -Patient not a tenecteplase candidate nor a vascular intervention candidate; for  now:  -NPO   -Bedside swallow  -Follow [facility]  -Neuro checks  -Statin therapy (when safe to take oral intake)  -Check hemoglobin A1c (was 5.2 in 2022)  -Check ECHO  -Permissive hypertension (SBP around 180 mm Hg)->prn IV Hydralazine  -Neurology consult  -PT/OT/speech evaluations  -Current altered mental status most likely due to IV Morphine lingering in her system (given her degree of renal impairment)  -However, family note concern for possible inappropriate medication usage by patient  -Discussed with family that even if she takes her medications appropriately, it could be too much for her (given her COPD, renal failure, obesity, etc.); for now:  -Expand workup to obtain ammonia level, VBG, urinalysis, and drug screen  -Given tobacco abuse, Nicotine patch  -Case management to see-> per my conversation with the family, the patient's care needs or exceeding what can be safely performed at home    Chronic combined systolic and diastolic heart failure/Nonischemic cardiomyopathy/Pulmonary hypertension/Nonrheumatic aortic valve stenosis with regurgitation    -Patient appears centrally euvolemic; per her MAR, on no scheduled diuretics  -Diurese prn  -Follow daily weights, strict Is&Os, and clinical exam  -Follow-up echo    Persistent atrial fibrillation    -Resume home Amiodarone  -Beta blocker on hold in order to prevent relative hypotension  -Per records, patient has a CHADS-VASc score of 3  -Resume Eliquis once cleared by Neurology (to minimize the risk of hemorrhagic conversion)    CKD (chronic kidney disease) stage 4, GFR 15-29 ml/min    -Creatinine at baseline  -Minimize nephrotoxic medications  -Follow up AM BMP    Morbid obesity    -Body mass index is 38.97 kg/m².   -Complicates all aspects of care      Karen Bradley MD  Department of Hospital Medicine  [facility] - Emergency Dept

## 2024-12-09 ENCOUNTER — PATIENT MESSAGE (OUTPATIENT)
Dept: FAMILY MEDICINE | Facility: CLINIC | Age: 69
End: 2024-12-09
Payer: MEDICARE

## 2024-12-09 ENCOUNTER — PATIENT MESSAGE (OUTPATIENT)
Dept: CARDIOLOGY | Facility: CLINIC | Age: 69
End: 2024-12-09
Payer: MEDICARE

## 2024-12-09 PROBLEM — I16.1 HYPERTENSIVE EMERGENCY: Status: ACTIVE | Noted: 2024-12-09

## 2024-12-09 PROBLEM — I63.9 CVA (CEREBRAL VASCULAR ACCIDENT): Status: RESOLVED | Noted: 2024-12-08 | Resolved: 2024-12-09

## 2024-12-09 PROBLEM — I42.8 NONISCHEMIC CARDIOMYOPATHY: Status: RESOLVED | Noted: 2023-06-12 | Resolved: 2024-12-09

## 2024-12-09 PROBLEM — L03.115 CELLULITIS OF RIGHT LOWER EXTREMITY: Status: ACTIVE | Noted: 2024-12-09

## 2024-12-09 PROBLEM — I10 ESSENTIAL HYPERTENSION: Status: RESOLVED | Noted: 2019-08-14 | Resolved: 2024-12-09

## 2024-12-09 LAB
ALBUMIN SERPL BCP-MCNC: 3.5 G/DL (ref 3.5–5.2)
ALP SERPL-CCNC: 129 U/L (ref 55–135)
ALT SERPL W/O P-5'-P-CCNC: 10 U/L (ref 10–44)
AMMONIA PLAS-SCNC: 29 UMOL/L (ref 10–50)
ANION GAP SERPL CALC-SCNC: 6 MMOL/L (ref 8–16)
AST SERPL-CCNC: 9 U/L (ref 10–40)
BASOPHILS # BLD AUTO: 0.03 K/UL (ref 0–0.2)
BASOPHILS NFR BLD: 0.4 % (ref 0–1.9)
BILIRUB SERPL-MCNC: 0.6 MG/DL (ref 0.1–1)
BUN SERPL-MCNC: 23 MG/DL (ref 8–23)
CALCIUM SERPL-MCNC: 8.6 MG/DL (ref 8.7–10.5)
CHLORIDE SERPL-SCNC: 103 MMOL/L (ref 95–110)
CO2 SERPL-SCNC: 25 MMOL/L (ref 23–29)
CREAT SERPL-MCNC: 2.1 MG/DL (ref 0.5–1.4)
DIFFERENTIAL METHOD BLD: ABNORMAL
EOSINOPHIL # BLD AUTO: 0.1 K/UL (ref 0–0.5)
EOSINOPHIL NFR BLD: 1.5 % (ref 0–8)
ERYTHROCYTE [DISTWIDTH] IN BLOOD BY AUTOMATED COUNT: 18.6 % (ref 11.5–14.5)
EST. GFR  (NO RACE VARIABLE): 24.7 ML/MIN/1.73 M^2
FOLATE SERPL-MCNC: 6.8 NG/ML (ref 4–24)
GLUCOSE SERPL-MCNC: 88 MG/DL (ref 70–110)
HCT VFR BLD AUTO: 31.5 % (ref 37–48.5)
HGB BLD-MCNC: 9 G/DL (ref 12–16)
IMM GRANULOCYTES # BLD AUTO: 0.04 K/UL (ref 0–0.04)
IMM GRANULOCYTES NFR BLD AUTO: 0.5 % (ref 0–0.5)
LYMPHOCYTES # BLD AUTO: 0.7 K/UL (ref 1–4.8)
LYMPHOCYTES NFR BLD: 8.8 % (ref 18–48)
MAGNESIUM SERPL-MCNC: 1.7 MG/DL (ref 1.6–2.6)
MCH RBC QN AUTO: 25.9 PG (ref 27–31)
MCHC RBC AUTO-ENTMCNC: 28.6 G/DL (ref 32–36)
MCV RBC AUTO: 91 FL (ref 82–98)
MONOCYTES # BLD AUTO: 0.5 K/UL (ref 0.3–1)
MONOCYTES NFR BLD: 7.2 % (ref 4–15)
NEUTROPHILS # BLD AUTO: 6.1 K/UL (ref 1.8–7.7)
NEUTROPHILS NFR BLD: 81.6 % (ref 38–73)
NRBC BLD-RTO: 0 /100 WBC
PLATELET # BLD AUTO: 255 K/UL (ref 150–450)
PMV BLD AUTO: 9.7 FL (ref 9.2–12.9)
POTASSIUM SERPL-SCNC: 4.1 MMOL/L (ref 3.5–5.1)
PROT SERPL-MCNC: 6.1 G/DL (ref 6–8.4)
RBC # BLD AUTO: 3.47 M/UL (ref 4–5.4)
SODIUM SERPL-SCNC: 134 MMOL/L (ref 136–145)
VIT B12 SERPL-MCNC: 348 PG/ML (ref 210–950)
WBC # BLD AUTO: 7.47 K/UL (ref 3.9–12.7)

## 2024-12-09 PROCEDURE — 94761 N-INVAS EAR/PLS OXIMETRY MLT: CPT

## 2024-12-09 PROCEDURE — 97535 SELF CARE MNGMENT TRAINING: CPT

## 2024-12-09 PROCEDURE — 82140 ASSAY OF AMMONIA: CPT | Performed by: INTERNAL MEDICINE

## 2024-12-09 PROCEDURE — 92526 ORAL FUNCTION THERAPY: CPT

## 2024-12-09 PROCEDURE — 94640 AIRWAY INHALATION TREATMENT: CPT

## 2024-12-09 PROCEDURE — 36415 COLL VENOUS BLD VENIPUNCTURE: CPT | Performed by: INTERNAL MEDICINE

## 2024-12-09 PROCEDURE — 82746 ASSAY OF FOLIC ACID SERUM: CPT | Performed by: INTERNAL MEDICINE

## 2024-12-09 PROCEDURE — 99221 1ST HOSP IP/OBS SF/LOW 40: CPT | Mod: ,,, | Performed by: FAMILY MEDICINE

## 2024-12-09 PROCEDURE — 85025 COMPLETE CBC W/AUTO DIFF WBC: CPT | Performed by: INTERNAL MEDICINE

## 2024-12-09 PROCEDURE — 86592 SYPHILIS TEST NON-TREP QUAL: CPT | Performed by: INTERNAL MEDICINE

## 2024-12-09 PROCEDURE — 83735 ASSAY OF MAGNESIUM: CPT | Performed by: INTERNAL MEDICINE

## 2024-12-09 PROCEDURE — 27000221 HC OXYGEN, UP TO 24 HOURS

## 2024-12-09 PROCEDURE — 25000003 PHARM REV CODE 250: Performed by: INTERNAL MEDICINE

## 2024-12-09 PROCEDURE — 25000242 PHARM REV CODE 250 ALT 637 W/ HCPCS: Performed by: INTERNAL MEDICINE

## 2024-12-09 PROCEDURE — 86580 TB INTRADERMAL TEST: CPT | Performed by: INTERNAL MEDICINE

## 2024-12-09 PROCEDURE — 82607 VITAMIN B-12: CPT | Performed by: INTERNAL MEDICINE

## 2024-12-09 PROCEDURE — 21400001 HC TELEMETRY ROOM

## 2024-12-09 PROCEDURE — S4991 NICOTINE PATCH NONLEGEND: HCPCS | Performed by: INTERNAL MEDICINE

## 2024-12-09 PROCEDURE — 84425 ASSAY OF VITAMIN B-1: CPT | Performed by: INTERNAL MEDICINE

## 2024-12-09 PROCEDURE — 99900031 HC PATIENT EDUCATION (STAT)

## 2024-12-09 PROCEDURE — 80053 COMPREHEN METABOLIC PANEL: CPT | Performed by: INTERNAL MEDICINE

## 2024-12-09 PROCEDURE — 63600175 PHARM REV CODE 636 W HCPCS: Performed by: INTERNAL MEDICINE

## 2024-12-09 PROCEDURE — 94664 DEMO&/EVAL PT USE INHALER: CPT

## 2024-12-09 PROCEDURE — 30200315 PPD INTRADERMAL TEST REV CODE 302: Performed by: INTERNAL MEDICINE

## 2024-12-09 PROCEDURE — 97530 THERAPEUTIC ACTIVITIES: CPT

## 2024-12-09 PROCEDURE — 99900035 HC TECH TIME PER 15 MIN (STAT)

## 2024-12-09 RX ORDER — AMLODIPINE BESYLATE 5 MG/1
5 TABLET ORAL DAILY
Status: DISCONTINUED | OUTPATIENT
Start: 2024-12-09 | End: 2024-12-10

## 2024-12-09 RX ORDER — GABAPENTIN 300 MG/1
300 CAPSULE ORAL NIGHTLY PRN
Status: DISCONTINUED | OUTPATIENT
Start: 2024-12-09 | End: 2024-12-17 | Stop reason: HOSPADM

## 2024-12-09 RX ORDER — AMLODIPINE BESYLATE 2.5 MG/1
2.5 TABLET ORAL NIGHTLY
Status: DISCONTINUED | OUTPATIENT
Start: 2024-12-09 | End: 2024-12-09

## 2024-12-09 RX ORDER — ACETAMINOPHEN 325 MG/1
650 TABLET ORAL EVERY 6 HOURS PRN
Status: DISCONTINUED | OUTPATIENT
Start: 2024-12-09 | End: 2024-12-17 | Stop reason: HOSPADM

## 2024-12-09 RX ORDER — METOPROLOL SUCCINATE 25 MG/1
25 TABLET, EXTENDED RELEASE ORAL DAILY
Status: DISCONTINUED | OUTPATIENT
Start: 2024-12-09 | End: 2024-12-13

## 2024-12-09 RX ADMIN — ACETAMINOPHEN 650 MG: 325 TABLET ORAL at 09:12

## 2024-12-09 RX ADMIN — HYDRALAZINE HYDROCHLORIDE 10 MG: 20 INJECTION, SOLUTION INTRAMUSCULAR; INTRAVENOUS at 12:12

## 2024-12-09 RX ADMIN — IPRATROPIUM BROMIDE AND ALBUTEROL SULFATE 3 ML: .5; 3 SOLUTION RESPIRATORY (INHALATION) at 06:12

## 2024-12-09 RX ADMIN — IPRATROPIUM BROMIDE AND ALBUTEROL SULFATE 3 ML: .5; 3 SOLUTION RESPIRATORY (INHALATION) at 03:12

## 2024-12-09 RX ADMIN — IPRATROPIUM BROMIDE AND ALBUTEROL SULFATE 3 ML: .5; 3 SOLUTION RESPIRATORY (INHALATION) at 07:12

## 2024-12-09 RX ADMIN — AMLODIPINE BESYLATE 5 MG: 5 TABLET ORAL at 12:12

## 2024-12-09 RX ADMIN — ASPIRIN 81 MG 81 MG: 81 TABLET ORAL at 08:12

## 2024-12-09 RX ADMIN — AMIODARONE HYDROCHLORIDE 200 MG: 200 TABLET ORAL at 08:12

## 2024-12-09 RX ADMIN — ATORVASTATIN CALCIUM 40 MG: 40 TABLET, FILM COATED ORAL at 08:12

## 2024-12-09 RX ADMIN — TUBERCULIN PURIFIED PROTEIN DERIVATIVE 5 UNITS: 5 INJECTION, SOLUTION INTRADERMAL at 05:12

## 2024-12-09 RX ADMIN — METOPROLOL SUCCINATE 25 MG: 25 TABLET, FILM COATED, EXTENDED RELEASE ORAL at 08:12

## 2024-12-09 RX ADMIN — DOXYCYCLINE HYCLATE 100 MG: 100 CAPSULE ORAL at 08:12

## 2024-12-09 RX ADMIN — APIXABAN 5 MG: 5 TABLET, FILM COATED ORAL at 08:12

## 2024-12-09 RX ADMIN — HYDRALAZINE HYDROCHLORIDE 10 MG: 20 INJECTION, SOLUTION INTRAMUSCULAR; INTRAVENOUS at 04:12

## 2024-12-09 RX ADMIN — MIRTAZAPINE 15 MG: 7.5 TABLET, FILM COATED ORAL at 08:12

## 2024-12-09 RX ADMIN — NICOTINE 1 PATCH: 14 PATCH, EXTENDED RELEASE TRANSDERMAL at 08:12

## 2024-12-09 RX ADMIN — IPRATROPIUM BROMIDE AND ALBUTEROL SULFATE 3 ML: .5; 3 SOLUTION RESPIRATORY (INHALATION) at 12:12

## 2024-12-09 RX ADMIN — IPRATROPIUM BROMIDE AND ALBUTEROL SULFATE 3 ML: .5; 3 SOLUTION RESPIRATORY (INHALATION) at 04:12

## 2024-12-09 NOTE — ASSESSMENT & PLAN NOTE
Creatinine at baseline  Minimize nephrotoxic medications  Nephrology consulted per family request

## 2024-12-09 NOTE — CONSULTS
Right lateral abd. Fold red rash, clean and dry and applied miconazole powder    Left abdominal fold  red rash, clean and dry and apply miconazole powder .     Left arm bruising and skin tears in various stages of heeling, lateral arm 2.4x2.8x0.1cm total tissue loss, dry skin, smaller skin tears x 2 bruising, cleaned and dried and applied xeroform, wrapped upper and     Small red abrasion distal aspect of nose, states she hit it on a cabinet door about a week and a half ago, keep clean and dry. Complete skin assessment.

## 2024-12-09 NOTE — PROGRESS NOTES
"[facility] Medicine  Progress Note    Patient Name: Iris Mueller  MRN: [id number]  Patient Class: IP- Inpatient   Admission Date: 12/7/2024  Length of Stay: 1 days  Attending Physician: Garcia Soriano MD  Primary Care Provider: Elkin You MD        Subjective     Principal Problem:AMS (altered mental status)        HPI:  The patient is a 69-year-old  female with a history of hypertension, CKD-4, atrial fibrillation (on Eliquis), morbid obesity, COPD, hyperlipidemia, CAD, aortic stenosis/regurgitation, stroke, and history of systolic/diastolic congestive heart failure.  Her last echo performed in 2022 showed, per Dr. Wright:  ·   "The estimated ejection fraction is 40%. There is a anterior apical segment that is hypo to akinetic with a wall motion abnormality  · Grade II left ventricular diastolic dysfunction. Mean left atrial pressure 20 mm Hg. Mild mitral annular calcification  · There are segmental left ventricular wall motion abnormalities.  · Normal right ventricular size with normal right ventricular systolic function.  · Moderate left atrial enlargement.  · Moderate aortic regurgitation.  · There is moderate aortic valve stenosis.  · Aortic valve area is 1.55 cm2; peak velocity is 2.88 m/s; mean gradient is 15 mmHg.  · Moderate mitral regurgitation.  · There is mild pulmonary hypertension.  · Mild tricuspid regurgitation.  · Elevated central venous pressure (15 mmHg).  · The estimated PA systolic pressure is 47 mmHg."    Please note the obtaining history from the patient is impossible, given her current mental state.      The patient lives home with her  who has visual and hearing impediment.  Thus, the majority of the history is obtained from the ER physician, records, and 2 adult children at bedside.    Her children at bedside chair concern that the patient takes her prescribed narcotics inappropriately.  The patient was brought in tonight " "because of recurrent falls.  Over the last 2-3 weeks, the patient had complained that she was "off balance" and had fallen 7-8 times.    Around the 630 p.m., family worse talking to the patient on the phone apparently and knows that she had slurred speech and that she was "stuck on the Ottoman inked".  Somehow, the patient reached the neighbor and by the time the children got to the house she was sitting in her recliner (has been moved from the dining room chair, presumably with the assistance of the neighbor).  Thus she was conducted to the ER.      While here she was afebrile but hypertensive at 224/109 and this came down to 146/108.  NIH was 1.    Workup showed that head CT for stroke was negative.  She had multiple imaging studies including bilateral hip x-rays with pelvis, chest x-ray, left humerus, left shoulder, and CT cervical spine without contrast, all of which were unrevealing.    MRI brain without contrast showed (with an addenda, per radiologist Dr. Narvaez):    "Impression:     No acute ischemic process. Moderate to advanced periventricular and deep white matter changes of chronic microvascular ischemia. GRE sequence demonstrates punctate focus of susceptibility artifact in the right cerebellum and left basal ganglia representing microhemorrhage or calcification."    Furthermore, the patient had an MRI brain without contrast which showed, per radiologist Dr. Burnett:    Impression:     "Examination is significantly limited secondary to motion with apparent absence of flow related enhancement within the left middle cerebral artery branches, left vertebral artery, and the left posterior cerebral artery.  This is most likely artifactual.     CTA may be attempted for further evaluation."    She was evaluated by vascular Neurology and my understanding is that she was not considered an intervention candidate.  Additionally, as her last known normal was not precisely known (as she is already fully " anticoagulated with Eliquis), she was not considered to be a tenecteplase candidate, either.  She is admitted for further diagnosis, treatment, and care.    Overview/Hospital Course:  Patient is a 69 year old female with history of HTN, presented with recurrent falls, and slurred speech. Patient was found to be in hypertensive emergency on admission. CT C spine and head was negative. MRI brain was negative for acute findings. Showed punctate focus of susceptibility artifact in the right cerebellum and left basal ganglia representing microhemorrhage or calcification. Neurology was consulted. Patient was started on aspirin and Lipitor for possible TIA. Her home Eliquis was resumed. Patient was also started on PO Doxycycline for right lower extremity cellulitis. Nephrology consulted per family request for CKD IV. Patient continue to have episodes of confusion. Dementia work up sent. Patient worked with PT, OT and recommended SNF.     Interval History: Patient is alert and oriented to place and person. Daughter is at the bedside and states that she is confused. She has been talking to people not present in the room and talking about things that happened long time ago. She is afebrile.     Review of Systems  Objective:     Vital Signs (Most Recent):  Temp: 98.7 °F (37.1 °C) (12/09/24 0748)  Pulse: 106 (12/09/24 1029)  Resp: 18 (12/09/24 0748)  BP: (!) 173/112 (12/09/24 0748)  SpO2: 98 % (12/09/24 0748) Vital Signs (24h Range):  Temp:  [97.7 °F (36.5 °C)-98.7 °F (37.1 °C)] 98.7 °F (37.1 °C)  Pulse:  [] 106  Resp:  [16-20] 18  SpO2:  [90 %-98 %] 98 %  BP: (135-174)/() 173/112     Weight: 106 kg (233 lb 10.2 oz)  Body mass index is 41.39 kg/m².    Intake/Output Summary (Last 24 hours) at 12/9/2024 1110  Last data filed at 12/9/2024 0755  Gross per 24 hour   Intake 600 ml   Output 0 ml   Net 600 ml         Physical Exam  Constitutional:       General: She is not in acute distress.     Appearance: She is obese.  She is not ill-appearing, toxic-appearing or diaphoretic.   HENT:      Head: Normocephalic and atraumatic.   Eyes:      General: No scleral icterus.        Right eye: No discharge.         Left eye: No discharge.   Neck:      Comments: No retractions; no nuchal rigidity  Cardiovascular:      Rate and Rhythm: Regular rhythm.      Heart sounds: Murmur (3/6 systolic murmur) heard.      No friction rub. No gallop.   Pulmonary:      Effort: Pulmonary effort is normal.      Breath sounds: Normal breath sounds.   Abdominal:      General: Bowel sounds are normal. There is no distension.      Palpations: Abdomen is soft. There is no mass.      Tenderness: There is no abdominal tenderness.   Musculoskeletal:      Right lower leg: Edema present.      Left lower leg: Edema present.   Skin:     General: Skin is warm and dry.   Neurological:      Comments: no focal signs. Oriented to place and person and not time. Some confusion present, patient talks out of the context.     Significant Labs: All pertinent labs within the past 24 hours have been reviewed.  CBC:   Recent Labs   Lab 12/07/24 1955 12/08/24 0349 12/09/24 0415   WBC 7.30 6.02 7.47   HGB 8.9* 8.7* 9.0*   HCT 31.0* 31.6* 31.5*    216 255     CMP:   Recent Labs   Lab 12/07/24 1955 12/08/24 0349 12/09/24 0415   * 135* 134*   K 4.1 4.2 4.1    105 103   CO2 24 23 25   GLU 90 89 88   BUN 24* 23 23   CREATININE 2.2* 2.1* 2.1*   CALCIUM 8.6* 8.3* 8.6*   PROT 6.5 6.0 6.1   ALBUMIN 3.7 3.4* 3.5   BILITOT 0.8 0.7 0.6   ALKPHOS 141* 124 129   AST 13 10 9*   ALT 14 10 10   ANIONGAP 7* 7* 6*       Significant Imaging: I have reviewed all pertinent imaging results/findings within the past 24 hours.    Assessment and Plan     * AMS (altered mental status)  Acute encephalopathy vs progressive dementia with delirium   MRI negative for acute stroke   Could be related to hypertensive encephalopathy however family states symptoms has been going on for about 2  months   - sent B12, folate, thiamine, RPR and ammonia level   - neurology following   - Cont aspirin, Lipitor, Eliquis     Hypertensive emergency  With slurred speech and confusion     Home meds for hypertension were reviewed and noted below.   Hypertension Medications               amLODIPine (NORVASC) 2.5 MG tablet Take 1 tablet (2.5 mg total) by mouth every evening.    metoprolol succinate (TOPROL-XL) 25 MG 24 hr tablet Take 1 tablet (25 mg total) by mouth once daily.          Norvasc increased to 5 mg  Cont metoprolol     Cellulitis of right lower extremity  US DVT ordered  PO Doxycycline D2      Tobacco dependency  Nicotine patch    Chronic combined systolic and diastolic heart failure    As above    Pulmonary hypertension    As above    Nonrheumatic aortic valve stenosis with regurgitation  Echo shows moderate to severe stenosis     Persistent atrial fibrillation  Resume home Amiodarone  Resume metoprolol  Per records, patient has a CHADS-VASc score of 3  Resume Eliquis     Morbid obesity  Body mass index is 41.39 kg/m².   Complicates all aspects of care        CKD (chronic kidney disease) stage 4, GFR 15-29 ml/min  Creatinine at baseline  Minimize nephrotoxic medications  Nephrology consulted per family request       VTE Risk Mitigation (From admission, onward)           Ordered     apixaban tablet 5 mg  2 times daily         12/09/24 0748     IP VTE HIGH RISK PATIENT  Once         12/08/24 0234     Place sequential compression device  Until discontinued         12/08/24 0234                    Discharge Planning   VALERIANO: 12/10/2024     Code Status: Full Code   Medical Readiness for Discharge Date:   Discharge Plan A: Skilled Nursing Facility                Garcia Soriano MD  Department of Hospital Medicine   [facility]

## 2024-12-09 NOTE — CARE UPDATE
12/09/24 0642   Patient Assessment/Suction   Level of Consciousness (AVPU) alert   Respiratory Effort Normal;Unlabored   Expansion/Accessory Muscles/Retractions expansion symmetric;no retractions;no use of accessory muscles   All Lung Fields Breath Sounds Anterior:;Lateral:;diminished;wheezes, expiratory   Rhythm/Pattern, Respiratory pattern regular;unlabored   PRE-TX-O2   Device (Oxygen Therapy) nasal cannula   $ Is the patient on Low Flow Oxygen? Yes   Flow (L/min) (Oxygen Therapy) 2   SpO2 (!) 94 %   Pulse Oximetry Type Intermittent   $ Pulse Oximetry - Multiple Charge Pulse Oximetry - Multiple   Pulse 97   Resp 18   Aerosol Therapy   $ Aerosol Therapy Charges Aerosol Treatment   Daily Review of Necessity (SVN) completed   Respiratory Treatment Status (SVN) given   Treatment Route (SVN) mask;oxygen   Patient Position HOB elevated   Post Treatment Assessment (SVN) breath sounds improved   Signs of Intolerance (SVN) none   Vibratory PEP Therapy   $ Vibratory PEP Charges [place]obika Therapy   $ Vibratory PEP Tech Time Charges 15 min   Type (PEP Therapy) vibratory/oscillatory   Device (PEP Therapy) flutter   Route (PEP Therapy) instruction provided, follow-up   Breaths per Cycle (PEP Therapy) 10   Cycles (PEP Therapy) 3   PEP Pressure (cm H2O) 5   Education   $ Education Bronchodilator;15 min

## 2024-12-09 NOTE — CARE UPDATE
12/08/24 1947   Patient Assessment/Suction   Level of Consciousness (AVPU) alert   Respiratory Effort Normal;Unlabored   Expansion/Accessory Muscles/Retractions no use of accessory muscles;expansion symmetric   All Lung Fields Breath Sounds Anterior:;wheezes, expiratory   PRE-TX-O2   Device (Oxygen Therapy) nasal cannula   $ Is the patient on Low Flow Oxygen? Yes   Flow (L/min) (Oxygen Therapy) 2   SpO2 97 %   Pulse Oximetry Type Intermittent   $ Pulse Oximetry - Multiple Charge Pulse Oximetry - Multiple   Pulse 79   Resp 16   Aerosol Therapy   $ Aerosol Therapy Charges Aerosol Treatment   Daily Review of Necessity (SVN) completed   Respiratory Treatment Status (SVN) given   Treatment Route (SVN) mask;oxygen   Patient Position HOB elevated   Post Treatment Assessment (SVN) breath sounds unchanged   Signs of Intolerance (SVN) none   Education   $ Education 15 min;Bronchodilator;Oxygen

## 2024-12-09 NOTE — PLAN OF CARE
AL called and PASRR faxed to the [facility] of [facility]. ([phone]). PASRR scanned into . KINGA awaiting 142.      12/09/24 1136   Post-Acute Status   Post-Acute Authorization Placement   Discharge Plan   Discharge Plan A Skilled Nursing Facility     [id number]- [id number] added to  and KINGA sent referrals to all local SNF. KINGA also emailed Katherine with PHN to start SNF auth.    5107- KINGA met with pt and pt's family at bedside. KINGA sent referral for SNF in [employer]. Pt's daughter was looking at referrals in [employer] and stated they would make a decision and send it back to SW via [employer]. KINGA awaiting.

## 2024-12-09 NOTE — PT/OT/SLP PROGRESS
"Occupational Therapy   Treatment    Name: Iris Mueller  MRN: [id number]  Admitting Diagnosis:  AMS (altered mental status)     Diagnoses of Acute focal neurological deficit, Fall, Hip pain, Left shoulder pain, Left arm pain, Chest pain, and CVA (cerebral vascular accident) were pertinent to this visit.    Recommendations:     Discharge Recommendations: Moderate Intensity Therapy  Discharge Equipment Recommendations:  to be determined by next level of care  Barriers to discharge:  Decreased caregiver support    Assessment:     Iris Mueller is a 69 y.o. female with a medical diagnosis of AMS (altered mental status).  She presents with lucidness for the most part of OT session but still having some bouts of confusion. Performance deficits affecting function are weakness, impaired endurance, impaired self care skills, impaired functional mobility, gait instability, impaired balance, impaired cognition, decreased coordination, decreased upper extremity function, decreased lower extremity function, decreased safety awareness, pain, impaired coordination, impaired cardiopulmonary response to activity, edema, impaired skin.     Rehab Prognosis:  Good; patient would benefit from acute skilled OT services to address these deficits and reach maximum level of function.       Plan:     Patient to be seen 5 x/week to address the above listed problems via self-care/home management, therapeutic activities, therapeutic exercises  Plan of Care Expires: 01/08/25  Plan of Care Reviewed with: patient, spouse, daughter    Subjective     Chief Complaint: coughing and throat discomfort.  Patient/Family Comments/goals: pt agreeable. Per daughter, she is having some confusion today. "I am in a different room today. Pt was re-orientated.   Pain/Comfort:  Pain Rating 1: 0/10  Pain Rating Post-Intervention 1: 0/10    Objective:     Communicated with: nurse prior to session.  Patient found HOB elevated with telemetry, " oxygen, PureWick, bed alarm upon OT entry to room.    General Precautions: Standard, fall    Orthopedic Precautions:N/A  Braces: N/A  Respiratory Status: Nasal cannula, flow 2 L/min     Occupational Performance:     Bed Mobility:    Patient completed Rolling/Turning to Left with  minimum assistance and with side rail  Patient completed Rolling/Turning to Right with minimum assistance and with side rail  Patient completed Scooting/Bridging with minimum assistance and with side rail, bed in trendelenburg to scoot to HOB  Patient completed Supine to Sit with minimum assistance  Patient completed Sit to Supine with with minimum assist and rail    Functional Mobility/Transfers:  Patient completed Sit <> Stand Transfer with minimum assistance  with  rolling walker   Patient completed Toilet Transfer Step Transfer technique with contact guard assistance and minimum assistance with  rolling walker and bedside commode  Functional Mobility: Pt performed seated lateral scooting Min A toward HOB prior to returning to supine.    Activities of Daily Living:  Grooming: stand by assistance to wash face, hands, brush gums, brush hair seated unsupported on EOB   Toileting: maximal assistance for brief management and PW in standing to remove and in supine to replace after voiding on BSC.      Treatment & Education:  Pt seen for safe self care activities and fx mobility retraining as stated above.  All questions/concerns addressed within scope.  Call don't fall. Pt acknowledged.  Purpose of OT and POC  Pt re-educated and cued to perform PLB/deep breathing for mild SOB with activity exertion.   Pt re-oriented to situation and location.      Patient left HOB elevated with all lines intact, call button in reach, bed alarm on, and daughter, son and spouse present    GOALS:   Multidisciplinary Problems       Occupational Therapy Goals          Problem: Occupational Therapy    Goal Priority Disciplines Outcome Interventions   Occupational  Therapy Goal     OT, PT/OT Progressing    Description: Goals to be met by: 1/8/2024     Patient will increase functional independence with ADLs by performing:    UE Dressing with Modified [place].  LE Dressing with Supervision and Assistive Devices as needed.  Grooming while standing at sink with Supervision.  Toileting from bedside commode with Supervision for hygiene and clothing management.   Supine to sit with Modified [place].  Step transfer with Supervision  Toilet transfer to toilet with Supervision.                         Time Tracking:     OT Date of Treatment: 12/09/24  OT Start Time: 1355  OT Stop Time: 1433  OT Total Time (min): 38 min    Billable Minutes:Self Care/Home Management 25  Therapeutic Activity 13  Total Time 38    OT/EMI: OT          12/9/2024

## 2024-12-09 NOTE — SUBJECTIVE & OBJECTIVE
Interval History: Patient is alert and oriented to place and person. Daughter is at the bedside and states that she is confused. She has been talking to people not present in the room and talking about things that happened long time ago. She is afebrile.     Review of Systems  Objective:     Vital Signs (Most Recent):  Temp: 98.7 °F (37.1 °C) (12/09/24 0748)  Pulse: 106 (12/09/24 1029)  Resp: 18 (12/09/24 0748)  BP: (!) 173/112 (12/09/24 0748)  SpO2: 98 % (12/09/24 0748) Vital Signs (24h Range):  Temp:  [97.7 °F (36.5 °C)-98.7 °F (37.1 °C)] 98.7 °F (37.1 °C)  Pulse:  [] 106  Resp:  [16-20] 18  SpO2:  [90 %-98 %] 98 %  BP: (135-174)/() 173/112     Weight: 106 kg (233 lb 10.2 oz)  Body mass index is 41.39 kg/m².    Intake/Output Summary (Last 24 hours) at 12/9/2024 1110  Last data filed at 12/9/2024 0755  Gross per 24 hour   Intake 600 ml   Output 0 ml   Net 600 ml         Physical Exam  Constitutional:       General: She is not in acute distress.     Appearance: She is obese. She is not ill-appearing, toxic-appearing or diaphoretic.   HENT:      Head: Normocephalic and atraumatic.   Eyes:      General: No scleral icterus.        Right eye: No discharge.         Left eye: No discharge.   Neck:      Comments: No retractions; no nuchal rigidity  Cardiovascular:      Rate and Rhythm: Regular rhythm.      Heart sounds: Murmur (3/6 systolic murmur) heard.      No friction rub. No gallop.   Pulmonary:      Effort: Pulmonary effort is normal.      Breath sounds: Normal breath sounds.   Abdominal:      General: Bowel sounds are normal. There is no distension.      Palpations: Abdomen is soft. There is no mass.      Tenderness: There is no abdominal tenderness.   Musculoskeletal:      Right lower leg: Edema present.      Left lower leg: Edema present.   Skin:     General: Skin is warm and dry.   Neurological:      Comments: no focal signs. Oriented to place and person and not time. Some confusion present, patient  talks out of the context.     Significant Labs: All pertinent labs within the past 24 hours have been reviewed.  CBC:   Recent Labs   Lab 12/07/24 1955 12/08/24 0349 12/09/24 0415   WBC 7.30 6.02 7.47   HGB 8.9* 8.7* 9.0*   HCT 31.0* 31.6* 31.5*    216 255     CMP:   Recent Labs   Lab 12/07/24 1955 12/08/24 0349 12/09/24 0415   * 135* 134*   K 4.1 4.2 4.1    105 103   CO2 24 23 25   GLU 90 89 88   BUN 24* 23 23   CREATININE 2.2* 2.1* 2.1*   CALCIUM 8.6* 8.3* 8.6*   PROT 6.5 6.0 6.1   ALBUMIN 3.7 3.4* 3.5   BILITOT 0.8 0.7 0.6   ALKPHOS 141* 124 129   AST 13 10 9*   ALT 14 10 10   ANIONGAP 7* 7* 6*       Significant Imaging: I have reviewed all pertinent imaging results/findings within the past 24 hours.

## 2024-12-09 NOTE — CONSULTS
INPATIENT NEPHROLOGY CONSULT   [facility]    Patient Name: Iris Mueller  Date: 12/09/2024    Reason for consultation: CKD IV    Chief Complaint:   Chief Complaint   Patient presents with    Headache     Fell twice today , slurred speech started at 1830       History of Present Illness:  Patient is a 69 year old female with history of HTN, presented with recurrent falls, and slurred speech. Patient was found to be in hypertensive emergency on admission. CT C spine and head was negative. MRI brain was negative for acute findings. Showed punctate focus of susceptibility artifact in the right cerebellum and left basal ganglia representing microhemorrhage or calcification. Neurology was consulted. Patient was started on aspirin and Lipitor for possible TIA. Her home Eliquis was resumed. Patient was also started on PO Doxycycline for right lower extremity cellulitis. Nephrology consulted per family request for CKD IV. Patient continue to have episodes of confusion. Dementia work up sent. Patient worked with PT, OT and recommended SNF.     Interval History:  12/9- work on BP control- then edema management    Plan of Care:    Assessment:  CKD IV  HTN emerg/TIA  Edema  Hyponatremia  SHPT  Anemia of CKD    Plan:    - renal function at baseline  - not uremic- mental status issues combo of underlying age related and vascular dementia chronically and HTN emerg causing TIA acutely  - agree with norvasc  - may add lasix tomorrow  - 2g salt, 1.5L fluid per day  - no active BMM issues  - supplement iron- DENEEN when BP improved    Thank you for allowing us to participate in this patient's care. We will continue to follow.    Vital Signs:  Temp Readings from Last 3 Encounters:   12/09/24 98.4 °F (36.9 °C) (Oral)   02/12/24 97.8 °F (36.6 °C) (Oral)   01/12/24 97.6 °F (36.4 °C) (Oral)       Pulse Readings from Last 3 Encounters:   12/09/24 97   10/30/24 89   09/10/24 92       BP Readings from Last 3  Encounters:   24 (!) 181/109   10/30/24 130/80   09/10/24 132/82       Weight:  Wt Readings from Last 3 Encounters:   24 106 kg (233 lb 10.2 oz)   24 106 kg (233 lb 11 oz)   10/30/24 96.2 kg (212 lb)       INS/OUTS:  I/O last 3 completed shifts:  In: 1090 [P.O.:840; IV Piggyback:250]  Out: 200 [Urine:200]  I/O this shift:  In: 150 [P.O.:150]  Out: 0     Past Medical & Surgical History:  Past Medical History:   Diagnosis Date    Anemia, unspecified     Arthritis     Asthma     COPD (chronic obstructive pulmonary disease)     Encounter for blood transfusion     Hypertension     Obese body habitus     Wound infection 2019    Right knee       Past Surgical History:   Procedure Laterality Date    ANGIOGRAM, CORONARY, WITH LEFT HEART CATHETERIZATION N/A 2022    Procedure: Angiogram, Coronary, with Left Heart Cath;  Surgeon: Jorge Tran MD;  Location: [facility] CATH/EP LAB;  Service: Cardiology;  Laterality: N/A;     SECTION      ECHOCARDIOGRAM,TRANSESOPHAGEAL N/A 2023    Procedure: Transesophageal echo (MARIANO) intra-procedure log documentation;  Surgeon: Vikram, Dos Diagnostic;  Location: [facility] EP LAB;  Service: Cardiology;  Laterality: N/A;    INCISION AND DRAINAGE OF HEMATOMA Left 2024    Procedure: INCISION AND DRAINAGE, HEMATOMA;  Surgeon: Bryson Huerta MD;  Location: [facility] OR;  Service: Orthopedics;  Laterality: Left;    JOINT REPLACEMENT      Knee    KNEE ARTHROPLASTY Right 2019    Procedure: ARTHROPLASTY, KNEE;  Surgeon: Delio Chung MD;  Location: [facility] OR;  Service: Orthopedics;  Laterality: Right;  anesthesia:  general and block    REPLACEMENT OF WOUND VACUUM-ASSISTED CLOSURE DEVICE Right 2019    Procedure: REPLACEMENT, WOUND VAC;  Surgeon: Delio Chung MD;  Location: [facility] OR;  Service: Orthopedics;  Laterality: Right;    TONSILLECTOMY      TREATMENT OF CARDIAC ARRHYTHMIA N/A 2023    Procedure: Cardioversion or Defibrillation;  Surgeon:  PJ Betancourt MD;  Location: [facility] EP LAB;  Service: Cardiology;  Laterality: N/A;  AF, MARINAO, DCCV, MAC, EH, 3 Prep    VENTRICULOGRAM, LEFT  12/23/2022    Procedure: Ventriculogram, Left;  Surgeon: Jorge Tran MD;  Location: [facility] CATH/EP LAB;  Service: Cardiology;;       Past Social History:  Social History     Socioeconomic History    Marital status:    Tobacco Use    Smoking status: Every Day     Current packs/day: 0.25     Average packs/day: 0.5 packs/day for 49.9 years (23.7 ttl pk-yrs)     Types: Cigarettes     Start date: 1975     Passive exposure: Current    Smokeless tobacco: Never    Tobacco comments:     Pt states she is a 46 year smoker, smokes around 5-8 cigarettes per day. Interested in nicotine replacement while admitted. Information given on outpatient smoking cessation.   Substance and Sexual Activity    Alcohol use: Yes     Comment: RARELY    Drug use: Never    Sexual activity: Not Currently     Partners: Male     Social Drivers of Health     Financial Resource Strain: Patient Declined (12/8/2024)    Overall Financial Resource Strain (CARDIA)     Difficulty of Paying Living Expenses: Patient declined   Food Insecurity: Patient Declined (12/8/2024)    Hunger Vital Sign     Worried About Running Out of Food in the Last Year: Patient declined     Ran Out of Food in the Last Year: Patient declined   Transportation Needs: Patient Declined (12/8/2024)    TRANSPORTATION NEEDS     Transportation : Patient declined   Physical Activity: Patient Declined (12/23/2022)    Exercise Vital Sign     Days of Exercise per Week: Patient declined     Minutes of Exercise per Session: Patient declined   Stress: Patient Declined (12/8/2024)    [employer] - Occupational Stress Questionnaire     Feeling of Stress : Patient declined   Housing Stability: Patient Declined (12/8/2024)    Housing Stability Vital Sign     Unable to Pay for Housing in the Last Year: Patient declined      Homeless in the Last Year: Patient declined       Medications:  Scheduled Meds:   albuterol-ipratropium  3 mL Nebulization Q4H    amiodarone  200 mg Oral Daily    amLODIPine  5 mg Oral Daily    apixaban  5 mg Oral BID    aspirin  81 mg Oral Daily    atorvastatin  40 mg Oral QHS    doxycycline  100 mg Oral Q12H    metoprolol succinate  25 mg Oral Daily    mirtazapine  15 mg Oral QHS    nicotine  1 patch Transdermal Daily     Continuous Infusions:  PRN Meds:.  Current Facility-Administered Medications:     gabapentin, 300 mg, Oral, Nightly PRN    hydrALAZINE, 10 mg, Intravenous, Q4H PRN    HYDROcodone-acetaminophen, 1 tablet, Oral, Q6H PRN    ondansetron, 4 mg, Intravenous, Q6H PRN  Current Facility-Administered Medications on File Prior to Encounter   Medication Dose Route Frequency Provider Last Rate Last Admin    lidocaine (PF) 10 mg/ml (1%) injection 5 mg  0.5 mL Intradermal Once Azeem Anderson MD         Current Outpatient Medications on File Prior to Encounter   Medication Sig Dispense Refill    amiodarone (PACERONE) 200 MG Tab Take 1 tablet (200 mg total) by mouth once daily. 30 tablet 11    apixaban (ELIQUIS) 5 mg Tab Take 5 mg by mouth 2 (two) times daily.      atorvastatin (LIPITOR) 10 MG tablet Take 1 tablet (10 mg total) by mouth every evening. 90 tablet 3    buPROPion (WELLBUTRIN SR) 150 MG TBSR 12 hr tablet Take 1 tablet (150 mg total) by mouth 2 (two) times daily. 180 tablet 3    FLUoxetine 40 MG capsule Take 1 capsule (40 mg total) by mouth once daily. 90 capsule 3    gabapentin (NEURONTIN) 300 MG capsule Take 1 capsule (300 mg total) by mouth 3 (three) times daily. (Patient taking differently: Take 300 mg by mouth nightly as needed (Pain).) 270 capsule 3    HYDROcodone-acetaminophen (NORCO) 5-325 mg per tablet Take 1 tablet by mouth every 6 (six) hours as needed for Pain. 90 tablet 0    loratadine (CLARITIN) 10 mg tablet Take 10 mg by mouth once daily.      metoprolol succinate (TOPROL-XL) 25 MG  "24 hr tablet Take 1 tablet (25 mg total) by mouth once daily. 90 tablet 3    mirtazapine (REMERON) 15 MG tablet Take 1 tablet (15 mg total) by mouth every evening. For sleep 30 tablet 3    mupirocin (BACTROBAN) 2 % ointment Apply topically 2 (two) times daily. To infected skin tear (Patient taking differently: Apply 1 g topically 2 (two) times daily. To infected skin tear) 22 g 1    triamcinolone acetonide 0.1% (KENALOG) 0.1 % cream Apply topically 2 (two) times daily. (Patient taking differently: Apply 1 g topically 2 (two) times daily.) 60 g 2    amLODIPine (NORVASC) 2.5 MG tablet Take 1 tablet (2.5 mg total) by mouth every evening. (Patient not taking: Reported on 12/8/2024) 90 tablet 3    betamethasone dipropionate 0.05 % cream Apply topically 2 (two) times daily. (Patient not taking: Reported on 12/8/2024) 45 g 2    HYDROcodone-acetaminophen (NORCO) 5-325 mg per tablet Take 1 tablet by mouth every 6 (six) hours as needed for Pain. 90 tablet 0    HYDROcodone-acetaminophen (NORCO) 5-325 mg per tablet Take 1 tablet by mouth every 6 (six) hours as needed for Pain. 90 tablet 0    methocarbamoL (ROBAXIN) 750 MG Tab Take 1 tablet (750 mg total) by mouth nightly as needed (muscle pain/spasms). (Patient not taking: Reported on 12/8/2024) 20 tablet 1    traZODone (DESYREL) 50 MG tablet Take 2 tablets (100 mg total) by mouth nightly as needed for Insomnia. (Patient not taking: Reported on 12/8/2024) 60 tablet 2       Allergies:  Patient has no known allergies.    Past Family History:  Reviewed; refer to Hospitalist Admission Note    Review of Systems:  Review of Systems - All 14 systems reviewed and negative, except as noted in HPI    Physical Exam:  BP (!) 181/109 (BP Location: Right forearm, Patient Position: Lying)   Pulse 97   Temp 98.4 °F (36.9 °C) (Oral)   Resp 18   Ht 5' 3" (1.6 m)   Wt 106 kg (233 lb 10.2 oz)   SpO2 97%   BMI 41.39 kg/m²     General Appearance:    NAD, AAO x 3, cooperative, appears stated " age   Head:    Normocephalic, atraumatic   Eyes:    PER, EOMI, and conjunctiva/sclera clear bilaterally        Mouth:   Moist mucus membranes, no thrush or oral lesions, normal      dentition   Back:     No CVA tenderness   Lungs:     Clear to auscultation bilaterally, no wheeze, crackles, rales      or rhonchi, symmetric air movement, respirations unlabored   Heart:    Regular rate and rhythm, S1 and S2 normal, no murmur, rub   or gallop   Abdomen:     Soft, non-tender, non-distended, bowel sounds active all four   quadrants, no RT or guarding, no masses, no organomegaly   Extremities:   Warm and well perfused, distal pulses intact, no cyanosis or    peripheral edema   MSK:   No joint or muscle swelling, tenderness or deformity   Skin:   Skin color, texture, turgor normal, no rashes or lesions   Neurologic/Psychiatric:   CNII-XII intact, normal strength and sensation       throughout, no asterixis; normal affect, memory, judgement     and insight     Results:  Lab Results   Component Value Date     (L) 12/09/2024    K 4.1 12/09/2024     12/09/2024    CO2 25 12/09/2024    BUN 23 12/09/2024    CREATININE 2.1 (H) 12/09/2024    CALCIUM 8.6 (L) 12/09/2024    ANIONGAP 6 (L) 12/09/2024    ESTGFRAFRICA 52 (L) 04/25/2022    EGFRNONAA 45 (L) 04/25/2022       Lab Results   Component Value Date    CALCIUM 8.6 (L) 12/09/2024    PHOS 4.1 09/05/2019       Recent Labs   Lab 12/09/24  0415   WBC 7.47   RBC 3.47*   HGB 9.0*   HCT 31.5*      MCV 91   MCH 25.9*   MCHC 28.6*       I have personally reviewed pertinent radiological imaging and reports from this admission.    I have spent > 70 minutes providing care for this patient for the above diagnoses. These services have included chart/data/imaging review, evaluation, exam, formulation of plan, , note preparation, and discussions with staff involved in this patient's care.    Brett Deng MD MPH   [facility]  [address] [place] [address]  [address]vd  [place] [address]  [phone] (p)  [phone] (f)

## 2024-12-09 NOTE — ASSESSMENT & PLAN NOTE
Acute encephalopathy vs progressive dementia with delirium   MRI negative for acute stroke   Could be related to hypertensive encephalopathy however family states symptoms has been going on for about 2 months   - sent B12, folate, thiamine, RPR and ammonia level   - neurology following   - Cont aspirin, Lipitor, Eliquis

## 2024-12-09 NOTE — ASSESSMENT & PLAN NOTE
With slurred speech and confusion     Home meds for hypertension were reviewed and noted below.   Hypertension Medications               amLODIPine (NORVASC) 2.5 MG tablet Take 1 tablet (2.5 mg total) by mouth every evening.    metoprolol succinate (TOPROL-XL) 25 MG 24 hr tablet Take 1 tablet (25 mg total) by mouth once daily.          Norvasc increased to 5 mg  Cont metoprolol

## 2024-12-09 NOTE — PT/OT/SLP PROGRESS
Physical Therapy      Patient Name:  Iris Mueller   MRN:  [id number]    Patient not seen today secondary to pt unavailable x2 . Will follow-up next scheduled visit.

## 2024-12-09 NOTE — CONSULTS
Chief complaint:  Headache (Fell twice today , slurred speech started at 1830)      HPI:  Iris Mueller is a 69 y.o. female presenting with Yeast infection of the Bilateral abdominal folds  Left arm bruising with skin tears multiple  Abrasion of the nose. All wounds were POA. Pt has had the rash, but unable to say how long. No other complaints today. Pt bumped her noase at home for the abrasion. Skin tears are from home too.    PMH:  As per HPI and below:  Past Medical History:   Diagnosis Date    Anemia, unspecified     Arthritis     Asthma     COPD (chronic obstructive pulmonary disease)     Encounter for blood transfusion     Hypertension     Obese body habitus     Wound infection 08/2019    Right knee       Social History     Socioeconomic History    Marital status:    Tobacco Use    Smoking status: Every Day     Current packs/day: 0.25     Average packs/day: 0.5 packs/day for 49.9 years (23.7 ttl pk-yrs)     Types: Cigarettes     Start date: 1975     Passive exposure: Current    Smokeless tobacco: Never    Tobacco comments:     Pt states she is a 46 year smoker, smokes around 5-8 cigarettes per day. Interested in nicotine replacement while admitted. Information given on outpatient smoking cessation.   Substance and Sexual Activity    Alcohol use: Yes     Comment: RARELY    Drug use: Never    Sexual activity: Not Currently     Partners: Male     Social Drivers of Health     Financial Resource Strain: Patient Declined (12/8/2024)    Overall Financial Resource Strain (CARDIA)     Difficulty of Paying Living Expenses: Patient declined   Food Insecurity: Patient Declined (12/8/2024)    Hunger Vital Sign     Worried About Running Out of Food in the Last Year: Patient declined     Ran Out of Food in the Last Year: Patient declined   Transportation Needs: Patient Declined (12/8/2024)    TRANSPORTATION NEEDS     Transportation : Patient declined   Physical Activity: Patient Declined (12/23/2022)     Exercise Vital Sign     Days of Exercise per Week: Patient declined     Minutes of Exercise per Session: Patient declined   Stress: Patient Declined (2024)    [employer] [place] [employer] - Occupational Stress Questionnaire     Feeling of Stress : Patient declined   Housing Stability: Patient Declined (2024)    Housing Stability Vital Sign     Unable to Pay for Housing in the Last Year: Patient declined     Homeless in the Last Year: Patient declined       Past Surgical History:   Procedure Laterality Date    ANGIOGRAM, CORONARY, WITH LEFT HEART CATHETERIZATION N/A 2022    Procedure: Angiogram, Coronary, with Left Heart Cath;  Surgeon: Jorge Tran MD;  Location: [facility] CATH/EP LAB;  Service: Cardiology;  Laterality: N/A;     SECTION      ECHOCARDIOGRAM,TRANSESOPHAGEAL N/A 2023    Procedure: Transesophageal echo (MARIANO) intra-procedure log documentation;  Surgeon: Araceli Sue Diagnostic;  Location: [facility] EP LAB;  Service: Cardiology;  Laterality: N/A;    INCISION AND DRAINAGE OF HEMATOMA Left 2024    Procedure: INCISION AND DRAINAGE, HEMATOMA;  Surgeon: Bryson Huerta MD;  Location: [facility] OR;  Service: Orthopedics;  Laterality: Left;    JOINT REPLACEMENT      Knee    KNEE ARTHROPLASTY Right 2019    Procedure: ARTHROPLASTY, KNEE;  Surgeon: Delio Chung MD;  Location: [facility] OR;  Service: Orthopedics;  Laterality: Right;  anesthesia:  general and block    REPLACEMENT OF WOUND VACUUM-ASSISTED CLOSURE DEVICE Right 2019    Procedure: REPLACEMENT, WOUND VAC;  Surgeon: Delio Chung MD;  Location: [facility] OR;  Service: Orthopedics;  Laterality: Right;    TONSILLECTOMY      TREATMENT OF CARDIAC ARRHYTHMIA N/A 2023    Procedure: Cardioversion or Defibrillation;  Surgeon: PJ Betancourt MD;  Location: [facility] EP LAB;  Service: Cardiology;  Laterality: N/A;  AF, MARIANO, DCCV, MAC, EH, 3 Prep    VENTRICULOGRAM, LEFT  2022    Procedure: Ventriculogram,  Left;  Surgeon: Jorge Tran MD;  Location: [facility] CATH/EP LAB;  Service: Cardiology;;       Family History   Problem Relation Name Age of Onset    Alzheimer's disease Mother         Review of patient's allergies indicates:  No Known Allergies    Current Facility-Administered Medications on File Prior to Encounter   Medication Dose Route Frequency Provider Last Rate Last Admin    lidocaine (PF) 10 mg/ml (1%) injection 5 mg  0.5 mL Intradermal Once Azeem Anderson MD         Current Outpatient Medications on File Prior to Encounter   Medication Sig Dispense Refill    amiodarone (PACERONE) 200 MG Tab Take 1 tablet (200 mg total) by mouth once daily. 30 tablet 11    apixaban (ELIQUIS) 5 mg Tab Take 5 mg by mouth 2 (two) times daily.      atorvastatin (LIPITOR) 10 MG tablet Take 1 tablet (10 mg total) by mouth every evening. 90 tablet 3    buPROPion (WELLBUTRIN SR) 150 MG TBSR 12 hr tablet Take 1 tablet (150 mg total) by mouth 2 (two) times daily. 180 tablet 3    FLUoxetine 40 MG capsule Take 1 capsule (40 mg total) by mouth once daily. 90 capsule 3    gabapentin (NEURONTIN) 300 MG capsule Take 1 capsule (300 mg total) by mouth 3 (three) times daily. (Patient taking differently: Take 300 mg by mouth nightly as needed (Pain).) 270 capsule 3    HYDROcodone-acetaminophen (NORCO) 5-325 mg per tablet Take 1 tablet by mouth every 6 (six) hours as needed for Pain. 90 tablet 0    loratadine (CLARITIN) 10 mg tablet Take 10 mg by mouth once daily.      metoprolol succinate (TOPROL-XL) 25 MG 24 hr tablet Take 1 tablet (25 mg total) by mouth once daily. 90 tablet 3    mirtazapine (REMERON) 15 MG tablet Take 1 tablet (15 mg total) by mouth every evening. For sleep 30 tablet 3    mupirocin (BACTROBAN) 2 % ointment Apply topically 2 (two) times daily. To infected skin tear (Patient taking differently: Apply 1 g topically 2 (two) times daily. To infected skin tear) 22 g 1    triamcinolone acetonide 0.1% (KENALOG) 0.1 % cream Apply  "topically 2 (two) times daily. (Patient taking differently: Apply 1 g topically 2 (two) times daily.) 60 g 2    amLODIPine (NORVASC) 2.5 MG tablet Take 1 tablet (2.5 mg total) by mouth every evening. (Patient not taking: Reported on 2024) 90 tablet 3    betamethasone dipropionate 0.05 % cream Apply topically 2 (two) times daily. (Patient not taking: Reported on 2024) 45 g 2    HYDROcodone-acetaminophen (NORCO) 5-325 mg per tablet Take 1 tablet by mouth every 6 (six) hours as needed for Pain. 90 tablet 0    HYDROcodone-acetaminophen (NORCO) 5-325 mg per tablet Take 1 tablet by mouth every 6 (six) hours as needed for Pain. 90 tablet 0    methocarbamoL (ROBAXIN) 750 MG Tab Take 1 tablet (750 mg total) by mouth nightly as needed (muscle pain/spasms). (Patient not taking: Reported on 2024) 20 tablet 1    traZODone (DESYREL) 50 MG tablet Take 2 tablets (100 mg total) by mouth nightly as needed for Insomnia. (Patient not taking: Reported on 2024) 60 tablet 2       ROS: As per HPI and below:  Pertinent items are noted in HPI.      Physical Exam:     Vitals:    24 1029 24 1147 24 1208 24 1245   BP:  (!) 181/109  (!) 150/88   BP Location:  Right forearm  Right arm   Patient Position:  Lying  Lying   Pulse: 106 98 97    Resp:  18 18    Temp:  98.4 °F (36.9 °C)     TempSrc:  Oral     SpO2:  (!) 94% 97%    Weight:       Height:           BP  Min: 124/86  Max: 224/109  Temp  Av.2 °F (36.8 °C)  Min: 97.3 °F (36.3 °C)  Max: 100 °F (37.8 °C)  Pulse  Av.3  Min: 67  Max: 106  Resp  Av  Min: 14  Max: 22  SpO2  Av.8 %  Min: 88 %  Max: 100 %  Height  Av' 3" (160 cm)  Min: 5' 3" (160 cm)  Max: 5' 3" (160 cm)  Weight  Av.9 kg (229 lb 1.7 oz)  Min: 99.8 kg (220 lb)  Max: 106 kg (233 lb 11 oz)    Body mass index is 41.39 kg/m².          General:             Well developed, well nourished, no apparent distress  HEENT:              NCAT, no JVD, mucous membranes moist, " EOM intact  Cardiovascular:  Regular rate and rhythm, normal S1, normal S2, No murmurs, rubs, or gallops  Respiratory:        Normal breath sounds, no wheezes, no rales, no rhonchi  Abdomen:           Bowel sounds present, non tender, non distended, no masses, no hepatojugular reflux  Extremities:        No clubbing, no cyanosis, no edema  Vascular:            2+ b/l radial.  Peripheral pulses intact.  No carotid bruits.  Neurological:      No focal deficits  Skin:                   No obvious rashes or erythema, Yeast infection of the Bilateral abdominal folds  Left arm bruising with skin tears multiple  Abrasion of the nose                    Lab Results   Component Value Date    WBC 7.47 12/09/2024    HGB 9.0 (L) 12/09/2024    HCT 31.5 (L) 12/09/2024    MCV 91 12/09/2024     12/09/2024     Lab Results   Component Value Date    CHOL 100 (L) 12/07/2024    CHOL 123 09/09/2024    CHOL 126 02/02/2024     Lab Results   Component Value Date    HDL 38 (L) 12/07/2024    HDL 49 (L) 09/09/2024    HDL 44 (L) 02/02/2024     Lab Results   Component Value Date    LDLCALC 44.0 (L) 12/07/2024    LDLCALC 58 09/09/2024    LDLCALC 67 02/02/2024     Lab Results   Component Value Date    TRIG 90 12/07/2024    TRIG 79 09/09/2024    TRIG 74 02/02/2024     Lab Results   Component Value Date    CHOLHDL 38.0 12/07/2024    CHOLHDL 2.5 09/09/2024    CHOLHDL 2.9 02/02/2024     CMP  Recent Labs   Lab 12/09/24  0415   GLU 88   CALCIUM 8.6*   ALBUMIN 3.5   PROT 6.1   *   K 4.1   CO2 25      BUN 23   CREATININE 2.1*   ALKPHOS 129   ALT 10   AST 9*   BILITOT 0.6      Lab Results   Component Value Date    TSH 1.814 12/07/2024           Assessment and Recommendations       Diagnoses:    Yeast infection of the Bilateral abdominal folds  Left arm bruising with skin tears multiple  Abrasion of the nose    Plan:  Miconazole powder to the abd skin folds  Xeroform to the skin tears of the arms  Will follow as  needed    Complexity:    moderate

## 2024-12-09 NOTE — PLAN OF CARE
Problem: Infection  Goal: Absence of Infection Signs and Symptoms  Outcome: Not Progressing     Problem: Fall Injury Risk  Goal: Absence of Fall and Fall-Related Injury  Outcome: Progressing     Problem: Wound  Goal: Optimal Coping  Outcome: Progressing  Goal: Optimal Functional Ability  Outcome: Progressing  Goal: Absence of Infection Signs and Symptoms  Outcome: Progressing  Goal: Improved Oral Intake  Outcome: Progressing  Goal: Optimal Pain Control and Function  Outcome: Progressing  Goal: Skin Health and Integrity  Outcome: Progressing  Goal: Optimal Wound Healing  Outcome: Progressing     Problem: Infection  Goal: Absence of Infection Signs and Symptoms  Outcome: Not Progressing   Patient b/p elevated this am, not up enough for PRN hydralazine, will continue assessment, patient continues to remove oxygen, having episodes of confusion when oxygen not in place,

## 2024-12-09 NOTE — HOSPITAL COURSE
Patient is a 69 year old female with history of HTN, presented with recurrent falls, and slurred speech. Patient was found to be in hypertensive emergency on admission. CT C spine and head was negative. MRI brain was negative for acute findings. Showed punctate focus of susceptibility artifact in the right cerebellum and left basal ganglia representing microhemorrhage or calcification. Neurology was consulted. Patient was started on aspirin and Lipitor for possible TIA. Her home Eliquis was resumed. Patient was also started on PO Doxycycline for right lower extremity cellulitis. Nephrology consulted per family request for CKD IV. Patient continue to have episodes of confusion. Dementia work up sent. Patient worked with PT, OT and recommended SNF.  Norvasc was increased. BP better controlled. Mental status improved. Patient discharged to [facility] for SNF. PO Doxy to continue for 10 days total. Patient was discharged but didn't leave as the facility did not take her due to BP being variable.  Skilled nursing facility did not accept the patient.  Renal artery Doppler ultrasound confirmed right-sided renal artery stenosis for which vascular surgery is being consulted.  Cardiologist was consulted at family request.

## 2024-12-09 NOTE — PT/OT/SLP PROGRESS
"Speech Language Pathology Treatment    Patient Name:  Iris Mueller   MRN:  [id number]  Admitting Diagnosis: AMS (altered mental status)    Recommendations:                 General Recommendations:  Speech Therapy follow up not indicated; Recommend Gastroenterologist (GI) consult for s/s LERD acute or out patient   Diet recommendations:  Regular Diet - IDDSI Level 7, Liquid Diet Level: Thin liquids - IDDSI Level 0   Aspiration Precautions:  reflux precautions, standard    General Precautions: Standard,    Communication strategies:  none    Assessment:     Iris Mueller is a 69 y.o. female with functional oral and pharyngeal phases of swallow. Patient continues with complaint of s/s of LERD. Recommend Gastroenterologist (GI) consult now or out patient after discharge. Skilled Speech Therapy not indicated.    Subjective     "Yes, I'd like the regular diet"    Nurse   present at the bedside. Upon entering the room Ms. Mueller was lying in bed. Head of bed elevated for therapy.  She was pleasant and cooperative.      INITIAL VALUES MOST RECENT       Nasal cannula, flow 2 L/min, O2 Sats: (!) 93 %   98.1 °F (36.7 °C) 98 °F (36.7 °C)    Pulse: 82 Pulse: 103   Respiratory Rate:   Respiratory Rate:19    WBC 7.30   WBC 7.47    Hgb: (!) 8.9 Hgb: (!) 9.0   Albumin 3.7 Albumin 3.5   Creatinine: (!) 2.2 Creatinine: (!) 2.1       Objective:     Has the patient been evaluated by SLP for swallowing?   Yes  Keep patient NPO? No   Current Respiratory Status:        Patient report and nurse report no difficulty with current diet Soft bite sized-Level IDDSI 6, Thin Liquid-Level IDDSI 0. Recommend upgrade to Regular Diet - IDDSI Level 7, Thin liquids-IDDSI Level 0 which poses no additional threats of aspiration or reflux than current diet. Patient provided with behavioral reflux precaution sheet with recommendations for Gastroenterologist (GI) consult for complaint of liquid coming back up to the level of above UES " for 3 to 6 months.       Goals:   Multidisciplinary Problems       SLP Goals       Not on file              Multidisciplinary Problems (Resolved)          Problem: SLP    Goal Priority Disciplines Outcome   SLP Goal   (Resolved)     SLP Met   Description: 1) The patient will progress from a IDDSI level 4 puree thin liquids level 0  to a IDDSI level 7 regular thin liquids level 0 diet with improved  improved endurance to complete a meal and no pulmonary compromise.-MET                                  Plan:     Patient to be seen:  1 x/week   Plan of Care expires:     Plan of Care reviewed with:  patient   SLP Follow-Up:  No       Discharge recommendations:  No Therapy Indicated   Barriers to Discharge:  None    Time Tracking:     SLP Treatment Date:   12/09/24  Speech Start Time:  1640  Speech Stop Time:  1648     Speech Total Time (min):  8 min    Billable Minutes: Treatment Swallowing Dysfunction 8    12/09/2024

## 2024-12-09 NOTE — ASSESSMENT & PLAN NOTE
Resume home Amiodarone  Resume metoprolol  Per records, patient has a CHADS-VASc score of 3  Resume Eliquis

## 2024-12-09 NOTE — PLAN OF CARE
Problem: SLP  Goal: SLP Goal  Description: 1) The patient will progress from a IDDSI level 4 puree thin liquids level 0  to a IDDSI level 7 regular thin liquids level 0 diet with improved  improved endurance to complete a meal and no pulmonary compromise.      Outcome: Met

## 2024-12-10 LAB
ALBUMIN SERPL BCP-MCNC: 3.5 G/DL (ref 3.5–5.2)
ALP SERPL-CCNC: 135 U/L (ref 55–135)
ALT SERPL W/O P-5'-P-CCNC: 10 U/L (ref 10–44)
ANION GAP SERPL CALC-SCNC: 7 MMOL/L (ref 8–16)
AST SERPL-CCNC: 9 U/L (ref 10–40)
BACTERIA UR CULT: ABNORMAL
BASOPHILS # BLD AUTO: 0.02 K/UL (ref 0–0.2)
BASOPHILS NFR BLD: 0.3 % (ref 0–1.9)
BILIRUB SERPL-MCNC: 0.8 MG/DL (ref 0.1–1)
BUN SERPL-MCNC: 23 MG/DL (ref 8–23)
CALCIUM SERPL-MCNC: 8.6 MG/DL (ref 8.7–10.5)
CHLORIDE SERPL-SCNC: 104 MMOL/L (ref 95–110)
CO2 SERPL-SCNC: 26 MMOL/L (ref 23–29)
CREAT SERPL-MCNC: 2 MG/DL (ref 0.5–1.4)
DIFFERENTIAL METHOD BLD: ABNORMAL
EOSINOPHIL # BLD AUTO: 0.2 K/UL (ref 0–0.5)
EOSINOPHIL NFR BLD: 2.2 % (ref 0–8)
ERYTHROCYTE [DISTWIDTH] IN BLOOD BY AUTOMATED COUNT: 18.7 % (ref 11.5–14.5)
EST. GFR  (NO RACE VARIABLE): 26.2 ML/MIN/1.73 M^2
GLUCOSE SERPL-MCNC: 98 MG/DL (ref 70–110)
HCT VFR BLD AUTO: 29.6 % (ref 37–48.5)
HGB BLD-MCNC: 8.6 G/DL (ref 12–16)
IMM GRANULOCYTES # BLD AUTO: 0.04 K/UL (ref 0–0.04)
IMM GRANULOCYTES NFR BLD AUTO: 0.6 % (ref 0–0.5)
LYMPHOCYTES # BLD AUTO: 0.6 K/UL (ref 1–4.8)
LYMPHOCYTES NFR BLD: 8.4 % (ref 18–48)
MAGNESIUM SERPL-MCNC: 1.6 MG/DL (ref 1.6–2.6)
MCH RBC QN AUTO: 26 PG (ref 27–31)
MCHC RBC AUTO-ENTMCNC: 29.1 G/DL (ref 32–36)
MCV RBC AUTO: 89 FL (ref 82–98)
MONOCYTES # BLD AUTO: 0.5 K/UL (ref 0.3–1)
MONOCYTES NFR BLD: 6.7 % (ref 4–15)
NEUTROPHILS # BLD AUTO: 5.8 K/UL (ref 1.8–7.7)
NEUTROPHILS NFR BLD: 81.8 % (ref 38–73)
NRBC BLD-RTO: 0 /100 WBC
PLATELET # BLD AUTO: 249 K/UL (ref 150–450)
PMV BLD AUTO: 9 FL (ref 9.2–12.9)
POTASSIUM SERPL-SCNC: 3.9 MMOL/L (ref 3.5–5.1)
PROT SERPL-MCNC: 6.2 G/DL (ref 6–8.4)
RBC # BLD AUTO: 3.31 M/UL (ref 4–5.4)
RPR SER QL: NORMAL
SODIUM SERPL-SCNC: 137 MMOL/L (ref 136–145)
WBC # BLD AUTO: 7.13 K/UL (ref 3.9–12.7)

## 2024-12-10 PROCEDURE — 25000003 PHARM REV CODE 250: Performed by: INTERNAL MEDICINE

## 2024-12-10 PROCEDURE — 80053 COMPREHEN METABOLIC PANEL: CPT | Performed by: INTERNAL MEDICINE

## 2024-12-10 PROCEDURE — 97116 GAIT TRAINING THERAPY: CPT | Mod: CQ

## 2024-12-10 PROCEDURE — 21400001 HC TELEMETRY ROOM

## 2024-12-10 PROCEDURE — 99900035 HC TECH TIME PER 15 MIN (STAT)

## 2024-12-10 PROCEDURE — 99900031 HC PATIENT EDUCATION (STAT)

## 2024-12-10 PROCEDURE — 97535 SELF CARE MNGMENT TRAINING: CPT

## 2024-12-10 PROCEDURE — 99406 BEHAV CHNG SMOKING 3-10 MIN: CPT | Performed by: CLINIC/CENTER

## 2024-12-10 PROCEDURE — 25000242 PHARM REV CODE 250 ALT 637 W/ HCPCS: Performed by: INTERNAL MEDICINE

## 2024-12-10 PROCEDURE — 36415 COLL VENOUS BLD VENIPUNCTURE: CPT | Performed by: INTERNAL MEDICINE

## 2024-12-10 PROCEDURE — 94640 AIRWAY INHALATION TREATMENT: CPT

## 2024-12-10 PROCEDURE — S4991 NICOTINE PATCH NONLEGEND: HCPCS | Performed by: INTERNAL MEDICINE

## 2024-12-10 PROCEDURE — 94761 N-INVAS EAR/PLS OXIMETRY MLT: CPT

## 2024-12-10 PROCEDURE — 85025 COMPLETE CBC W/AUTO DIFF WBC: CPT | Performed by: INTERNAL MEDICINE

## 2024-12-10 PROCEDURE — 27000221 HC OXYGEN, UP TO 24 HOURS

## 2024-12-10 PROCEDURE — 63600175 PHARM REV CODE 636 W HCPCS: Performed by: INTERNAL MEDICINE

## 2024-12-10 PROCEDURE — 83735 ASSAY OF MAGNESIUM: CPT | Performed by: INTERNAL MEDICINE

## 2024-12-10 PROCEDURE — 94664 DEMO&/EVAL PT USE INHALER: CPT

## 2024-12-10 RX ORDER — AMLODIPINE BESYLATE 5 MG/1
10 TABLET ORAL DAILY
Status: DISCONTINUED | OUTPATIENT
Start: 2024-12-11 | End: 2024-12-17 | Stop reason: HOSPADM

## 2024-12-10 RX ORDER — AMLODIPINE BESYLATE 5 MG/1
5 TABLET ORAL ONCE
Status: COMPLETED | OUTPATIENT
Start: 2024-12-10 | End: 2024-12-10

## 2024-12-10 RX ORDER — GUAIFENESIN AND DEXTROMETHORPHAN HYDROBROMIDE 10; 100 MG/5ML; MG/5ML
15 SYRUP ORAL EVERY 6 HOURS PRN
Status: DISCONTINUED | OUTPATIENT
Start: 2024-12-10 | End: 2024-12-17 | Stop reason: HOSPADM

## 2024-12-10 RX ADMIN — IPRATROPIUM BROMIDE AND ALBUTEROL SULFATE 3 ML: .5; 3 SOLUTION RESPIRATORY (INHALATION) at 12:12

## 2024-12-10 RX ADMIN — IPRATROPIUM BROMIDE AND ALBUTEROL SULFATE 3 ML: .5; 3 SOLUTION RESPIRATORY (INHALATION) at 04:12

## 2024-12-10 RX ADMIN — METOPROLOL SUCCINATE 25 MG: 25 TABLET, FILM COATED, EXTENDED RELEASE ORAL at 08:12

## 2024-12-10 RX ADMIN — MIRTAZAPINE 15 MG: 7.5 TABLET, FILM COATED ORAL at 08:12

## 2024-12-10 RX ADMIN — AMLODIPINE BESYLATE 5 MG: 5 TABLET ORAL at 12:12

## 2024-12-10 RX ADMIN — ASPIRIN 81 MG 81 MG: 81 TABLET ORAL at 08:12

## 2024-12-10 RX ADMIN — APIXABAN 5 MG: 5 TABLET, FILM COATED ORAL at 08:12

## 2024-12-10 RX ADMIN — AMLODIPINE BESYLATE 5 MG: 5 TABLET ORAL at 08:12

## 2024-12-10 RX ADMIN — IPRATROPIUM BROMIDE AND ALBUTEROL SULFATE 3 ML: .5; 3 SOLUTION RESPIRATORY (INHALATION) at 06:12

## 2024-12-10 RX ADMIN — IPRATROPIUM BROMIDE AND ALBUTEROL SULFATE 3 ML: .5; 3 SOLUTION RESPIRATORY (INHALATION) at 03:12

## 2024-12-10 RX ADMIN — NICOTINE 1 PATCH: 14 PATCH, EXTENDED RELEASE TRANSDERMAL at 08:12

## 2024-12-10 RX ADMIN — AMIODARONE HYDROCHLORIDE 200 MG: 200 TABLET ORAL at 08:12

## 2024-12-10 RX ADMIN — ATORVASTATIN CALCIUM 40 MG: 40 TABLET, FILM COATED ORAL at 08:12

## 2024-12-10 RX ADMIN — HYDROCODONE BITARTRATE AND ACETAMINOPHEN 1 TABLET: 5; 325 TABLET ORAL at 08:12

## 2024-12-10 RX ADMIN — DOXYCYCLINE HYCLATE 100 MG: 100 CAPSULE ORAL at 08:12

## 2024-12-10 RX ADMIN — IPRATROPIUM BROMIDE AND ALBUTEROL SULFATE 3 ML: .5; 3 SOLUTION RESPIRATORY (INHALATION) at 07:12

## 2024-12-10 RX ADMIN — GUAIFENESIN AND DEXTROMETHORPHAN 15 ML: 100; 10 SYRUP ORAL at 08:12

## 2024-12-10 RX ADMIN — HYDRALAZINE HYDROCHLORIDE 10 MG: 20 INJECTION, SOLUTION INTRAMUSCULAR; INTRAVENOUS at 05:12

## 2024-12-10 RX ADMIN — IPRATROPIUM BROMIDE AND ALBUTEROL SULFATE 3 ML: .5; 3 SOLUTION RESPIRATORY (INHALATION) at 11:12

## 2024-12-10 NOTE — SUBJECTIVE & OBJECTIVE
Interval History: Patient is alert and oriented. Sister is at the bedside today says couple of times she was talking to herself. Patient also says that since her  can't hear she most of the time talk to herself at home. BP is still high, is being adjusted by nephrology.     Review of Systems  Objective:     Vital Signs (Most Recent):  Temp: 97.5 °F (36.4 °C) (12/10/24 1123)  Pulse: 96 (12/10/24 1123)  Resp: 18 (12/10/24 1123)  BP: (!) 188/82 (12/10/24 1123)  SpO2: 98 % (12/10/24 1123) Vital Signs (24h Range):  Temp:  [97.5 °F (36.4 °C)-98.4 °F (36.9 °C)] 97.5 °F (36.4 °C)  Pulse:  [] 96  Resp:  [16-20] 18  SpO2:  [88 %-99 %] 98 %  BP: (134-188)/() 188/82     Weight: 106 kg (233 lb 10.2 oz)  Body mass index is 41.39 kg/m².    Intake/Output Summary (Last 24 hours) at 12/10/2024 1215  Last data filed at 12/10/2024 0504  Gross per 24 hour   Intake --   Output 700 ml   Net -700 ml      Physical Exam  Constitutional:       General: She is not in acute distress.     Appearance: She is obese. She is not ill-appearing, toxic-appearing or diaphoretic.   HENT:      Head: Normocephalic and atraumatic.   Eyes:      General: No scleral icterus.        Right eye: No discharge.         Left eye: No discharge.   Neck:      Comments: No retractions; no nuchal rigidity  Cardiovascular:      Rate and Rhythm: Regular rhythm.      Heart sounds: Murmur (3/6 systolic murmur) heard.      No friction rub. No gallop.   Pulmonary:      Effort: Pulmonary effort is normal.      Breath sounds: Normal breath sounds.   Abdominal:      General: Bowel sounds are normal. There is no distension.      Palpations: Abdomen is soft. There is no mass.      Tenderness: There is no abdominal tenderness.   Musculoskeletal:      Right lower leg: Edema present.      Left lower leg: Edema present.   Skin:     General: Skin is warm and dry.   Neurological:      Comments: no focal signs. Oriented to place and person and time.     Significant  Labs: All pertinent labs within the past 24 hours have been reviewed.  CBC:   Recent Labs   Lab 12/09/24  0415 12/10/24  0435   WBC 7.47 7.13   HGB 9.0* 8.6*   HCT 31.5* 29.6*    249     CMP:   Recent Labs   Lab 12/09/24 0415 12/10/24  0435   * 137   K 4.1 3.9    104   CO2 25 26   GLU 88 98   BUN 23 23   CREATININE 2.1* 2.0*   CALCIUM 8.6* 8.6*   PROT 6.1 6.2   ALBUMIN 3.5 3.5   BILITOT 0.6 0.8   ALKPHOS 129 135   AST 9* 9*   ALT 10 10   ANIONGAP 6* 7*       Significant Imaging: I have reviewed all pertinent imaging results/findings within the past 24 hours.

## 2024-12-10 NOTE — PROGRESS NOTES
"[facility] Medicine  Progress Note    Patient Name: Iris Mueller  MRN: [id number]  Patient Class: IP- Inpatient   Admission Date: 12/7/2024  Length of Stay: 2 days  Attending Physician: Garcia Soriano MD  Primary Care Provider: Elkin You MD        Subjective     Principal Problem:AMS (altered mental status)        HPI:  The patient is a 69-year-old  female with a history of hypertension, CKD-4, atrial fibrillation (on Eliquis), morbid obesity, COPD, hyperlipidemia, CAD, aortic stenosis/regurgitation, stroke, and history of systolic/diastolic congestive heart failure.  Her last echo performed in 2022 showed, per Dr. Wright:  ·   "The estimated ejection fraction is 40%. There is a anterior apical segment that is hypo to akinetic with a wall motion abnormality  · Grade II left ventricular diastolic dysfunction. Mean left atrial pressure 20 mm Hg. Mild mitral annular calcification  · There are segmental left ventricular wall motion abnormalities.  · Normal right ventricular size with normal right ventricular systolic function.  · Moderate left atrial enlargement.  · Moderate aortic regurgitation.  · There is moderate aortic valve stenosis.  · Aortic valve area is 1.55 cm2; peak velocity is 2.88 m/s; mean gradient is 15 mmHg.  · Moderate mitral regurgitation.  · There is mild pulmonary hypertension.  · Mild tricuspid regurgitation.  · Elevated central venous pressure (15 mmHg).  · The estimated PA systolic pressure is 47 mmHg."    Please note the obtaining history from the patient is impossible, given her current mental state.      The patient lives home with her  who has visual and hearing impediment.  Thus, the majority of the history is obtained from the ER physician, records, and 2 adult children at bedside.    Her children at bedside chair concern that the patient takes her prescribed narcotics inappropriately.  The patient was brought in tonight " "because of recurrent falls.  Over the last 2-3 weeks, the patient had complained that she was "off balance" and had fallen 7-8 times.    Around the 630 p.m., family worse talking to the patient on the phone apparently and knows that she had slurred speech and that she was "stuck on the Ottoman inked".  Somehow, the patient reached the neighbor and by the time the children got to the house she was sitting in her recliner (has been moved from the dining room chair, presumably with the assistance of the neighbor).  Thus she was conducted to the ER.      While here she was afebrile but hypertensive at 224/109 and this came down to 146/108.  NIH was 1.    Workup showed that head CT for stroke was negative.  She had multiple imaging studies including bilateral hip x-rays with pelvis, chest x-ray, left humerus, left shoulder, and CT cervical spine without contrast, all of which were unrevealing.    MRI brain without contrast showed (with an addenda, per radiologist Dr. Narvaez):    "Impression:     No acute ischemic process. Moderate to advanced periventricular and deep white matter changes of chronic microvascular ischemia. GRE sequence demonstrates punctate focus of susceptibility artifact in the right cerebellum and left basal ganglia representing microhemorrhage or calcification."    Furthermore, the patient had an MRI brain without contrast which showed, per radiologist Dr. Burnett:    Impression:     "Examination is significantly limited secondary to motion with apparent absence of flow related enhancement within the left middle cerebral artery branches, left vertebral artery, and the left posterior cerebral artery.  This is most likely artifactual.     CTA may be attempted for further evaluation."    She was evaluated by vascular Neurology and my understanding is that she was not considered an intervention candidate.  Additionally, as her last known normal was not precisely known (as she is already fully " anticoagulated with Eliquis), she was not considered to be a tenecteplase candidate, either.  She is admitted for further diagnosis, treatment, and care.    Overview/Hospital Course:  Patient is a 69 year old female with history of HTN, presented with recurrent falls, and slurred speech. Patient was found to be in hypertensive emergency on admission. CT C spine and head was negative. MRI brain was negative for acute findings. Showed punctate focus of susceptibility artifact in the right cerebellum and left basal ganglia representing microhemorrhage or calcification. Neurology was consulted. Patient was started on aspirin and Lipitor for possible TIA. Her home Eliquis was resumed. Patient was also started on PO Doxycycline for right lower extremity cellulitis. Nephrology consulted per family request for CKD IV. Patient continue to have episodes of confusion. Dementia work up sent. Patient worked with PT, OT and recommended SNF.     Interval History: Patient is alert and oriented. Sister is at the bedside today says couple of times she was talking to herself. Patient also says that since her  can't hear she most of the time talk to herself at home. BP is still high, is being adjusted by nephrology.     Review of Systems  Objective:     Vital Signs (Most Recent):  Temp: 97.5 °F (36.4 °C) (12/10/24 1123)  Pulse: 96 (12/10/24 1123)  Resp: 18 (12/10/24 1123)  BP: (!) 188/82 (12/10/24 1123)  SpO2: 98 % (12/10/24 1123) Vital Signs (24h Range):  Temp:  [97.5 °F (36.4 °C)-98.4 °F (36.9 °C)] 97.5 °F (36.4 °C)  Pulse:  [] 96  Resp:  [16-20] 18  SpO2:  [88 %-99 %] 98 %  BP: (134-188)/() 188/82     Weight: 106 kg (233 lb 10.2 oz)  Body mass index is 41.39 kg/m².    Intake/Output Summary (Last 24 hours) at 12/10/2024 1215  Last data filed at 12/10/2024 0504  Gross per 24 hour   Intake --   Output 700 ml   Net -700 ml      Physical Exam  Constitutional:       General: She is not in acute distress.     Appearance:  She is obese. She is not ill-appearing, toxic-appearing or diaphoretic.   HENT:      Head: Normocephalic and atraumatic.   Eyes:      General: No scleral icterus.        Right eye: No discharge.         Left eye: No discharge.   Neck:      Comments: No retractions; no nuchal rigidity  Cardiovascular:      Rate and Rhythm: Regular rhythm.      Heart sounds: Murmur (3/6 systolic murmur) heard.      No friction rub. No gallop.   Pulmonary:      Effort: Pulmonary effort is normal.      Breath sounds: Normal breath sounds.   Abdominal:      General: Bowel sounds are normal. There is no distension.      Palpations: Abdomen is soft. There is no mass.      Tenderness: There is no abdominal tenderness.   Musculoskeletal:      Right lower leg: Edema present.      Left lower leg: Edema present.   Skin:     General: Skin is warm and dry.   Neurological:      Comments: no focal signs. Oriented to place and person and time.     Significant Labs: All pertinent labs within the past 24 hours have been reviewed.  CBC:   Recent Labs   Lab 12/09/24 0415 12/10/24  0435   WBC 7.47 7.13   HGB 9.0* 8.6*   HCT 31.5* 29.6*    249     CMP:   Recent Labs   Lab 12/09/24  0415 12/10/24  0435   * 137   K 4.1 3.9    104   CO2 25 26   GLU 88 98   BUN 23 23   CREATININE 2.1* 2.0*   CALCIUM 8.6* 8.6*   PROT 6.1 6.2   ALBUMIN 3.5 3.5   BILITOT 0.6 0.8   ALKPHOS 129 135   AST 9* 9*   ALT 10 10   ANIONGAP 6* 7*       Significant Imaging: I have reviewed all pertinent imaging results/findings within the past 24 hours.    Assessment and Plan     * AMS (altered mental status)  Acute encephalopathy vs progressive dementia with delirium   MRI negative for acute stroke   Could be related to hypertensive encephalopathy however family states symptoms has been going on for about 2 months   - sent B12, folate, thiamine, RPR and ammonia level   - neurology following   - Cont aspirin, Lipitor, Eliquis     Hypertensive emergency  With slurred  speech and confusion     Home meds for hypertension were reviewed and noted below.   Hypertension Medications               amLODIPine (NORVASC) 2.5 MG tablet Take 1 tablet (2.5 mg total) by mouth every evening.    metoprolol succinate (TOPROL-XL) 25 MG 24 hr tablet Take 1 tablet (25 mg total) by mouth once daily.          Norvasc increased to 10 mg  Cont metoprolol   PRN hydralazine     Cellulitis of right lower extremity  US DVT ordered  PO Doxycycline D3      Tobacco dependency  Nicotine patch    Chronic combined systolic and diastolic heart failure    As above    Pulmonary hypertension    As above    Nonrheumatic aortic valve stenosis with regurgitation  Echo shows moderate to severe stenosis     Persistent atrial fibrillation  Resume home Amiodarone  Resume metoprolol  Per records, patient has a CHADS-VASc score of 3  Resume Eliquis     Morbid obesity  Body mass index is 41.39 kg/m².   Complicates all aspects of care        CKD (chronic kidney disease) stage 4, GFR 15-29 ml/min  Creatinine at baseline  Minimize nephrotoxic medications  Nephrology consulted per family request       VTE Risk Mitigation (From admission, onward)           Ordered     apixaban tablet 5 mg  2 times daily         12/09/24 0748     IP VTE HIGH RISK PATIENT  Once         12/08/24 0234     Place sequential compression device  Until discontinued         12/08/24 0234                    Discharge Planning   VALERIANO: 12/11/2024     Code Status: Full Code   Medical Readiness for Discharge Date:   Discharge Plan A: Skilled Nursing Facility                Garcia Soriano MD  Department of Hospital Medicine   [facility]

## 2024-12-10 NOTE — PLAN OF CARE
Problem: Physical Therapy  Goal: Physical Therapy Goal  Description: Goals to be met by: 2024     Patient will increase functional independence with mobility by performin. Supine to sit with Moderate Assistance  2. Sit to stand transfer with Moderate Assistance  3. Bed to chair transfer with Moderate Assistance using Rolling Walker  4. Gait  x 50 feet with Moderate Assistance using Rolling Walker.   5. Lower extremity exercise program x20 reps   Outcome: Progressing

## 2024-12-10 NOTE — CARE UPDATE
12/09/24 1915   Patient Assessment/Suction   Level of Consciousness (AVPU) alert   Respiratory Effort Normal;Unlabored   Expansion/Accessory Muscles/Retractions no use of accessory muscles   All Lung Fields Breath Sounds coarse   Rhythm/Pattern, Respiratory pattern regular;unlabored   Cough Frequency with stimulation   Cough Type congested;nonproductive   PRE-TX-O2   Device (Oxygen Therapy) nasal cannula   $ Is the patient on Low Flow Oxygen? Yes   Oxygen Concentration (%) 2   SpO2 98 %  (Simultaneous filing. User may not have seen previous data.)   Pulse Oximetry Type Intermittent   $ Pulse Oximetry - Multiple Charge Pulse Oximetry - Multiple   Pulse 101  (Simultaneous filing. User may not have seen previous data.)   Resp 18  (Simultaneous filing. User may not have seen previous data.)   Positioning Supine   Positioning   Body Position position changed independently   Head of Bed (HOB) Positioning HOB at 30 degrees   Positioning/Transfer Devices pillows;in use   Aerosol Therapy   $ Aerosol Therapy Charges Aerosol Treatment   Daily Review of Necessity (SVN) completed   Respiratory Treatment Status (SVN) given   Treatment Route (SVN) mask;oxygen   Patient Position HOB elevated   Post Treatment Assessment (SVN) patient reports breathing improved   Signs of Intolerance (SVN) none   Vibratory PEP Therapy   $ Vibratory PEP Charges Aerobika Therapy   $ Vibratory PEP Equipment Aerobika Equipment   $ Vibratory PEP Tech Time Charges 15 min   Daily Review of Necessity (PEP Therapy) completed   Type (PEP Therapy) vibratory/oscillatory   Device (PEP Therapy) flutter   Route (PEP Therapy) independent;self-administered   Breaths per Cycle (PEP Therapy) 10   Cycles (PEP Therapy) 2   PEP Pressure (cm H2O) 5   PEP Duration (min) 5   Settings (PEP Therapy) PEP 5   Patient Position (PEP Therapy) HOB elevated   Ready to Wean/Extubation Screen   FIO2<=50 (chart decimal) 0.02   Education   $ Education Bronchodilator;PEP Therapy;15 min

## 2024-12-10 NOTE — ASSESSMENT & PLAN NOTE
With slurred speech and confusion     Home meds for hypertension were reviewed and noted below.   Hypertension Medications               amLODIPine (NORVASC) 2.5 MG tablet Take 1 tablet (2.5 mg total) by mouth every evening.    metoprolol succinate (TOPROL-XL) 25 MG 24 hr tablet Take 1 tablet (25 mg total) by mouth once daily.          Norvasc increased to 10 mg  Cont metoprolol   PRN hydralazine

## 2024-12-10 NOTE — PROGRESS NOTES
INPATIENT NEPHROLOGY CONSULT   [facility]    Patient Name: Iris Mueller  Date: 12/10/2024    Reason for consultation: CKD IV    Chief Complaint:   Chief Complaint   Patient presents with    Headache     Fell twice today , slurred speech started at 1830       History of Present Illness:  Patient is a 69 year old female with history of HTN, presented with recurrent falls, and slurred speech. Patient was found to be in hypertensive emergency on admission. CT C spine and head was negative. MRI brain was negative for acute findings. Showed punctate focus of susceptibility artifact in the right cerebellum and left basal ganglia representing microhemorrhage or calcification. Neurology was consulted. Patient was started on aspirin and Lipitor for possible TIA. Her home Eliquis was resumed. Patient was also started on PO Doxycycline for right lower extremity cellulitis. Nephrology consulted per family request for CKD IV. Patient continue to have episodes of confusion. Dementia work up sent. Patient worked with PT, OT and recommended SNF.     Interval History:  12/9- work on BP control- then edema management  12/10  975 ml UOP recorded.  BP starting to improve--still high at times.  Na+ improved.  Renal function stable.  Sleepy, is able to converse some, though.    Plan of Care:    Assessment:  CKD IV  HTN emerg/TIA  Edema  Hyponatremia  SHPT  Anemia of CKD    Plan:    - renal function at baseline  - not uremic- mental status issues combo of underlying age related and vascular dementia chronically and HTN emerg causing TIA acutely  - agree with norvasc  - may add lasix tomorrow  - 2g salt, 1.5L fluid per day  - no active BMM issues  - supplement iron- DENEEN when BP improved    Thank you for allowing us to participate in this patient's care. We will continue to follow.    Vital Signs:  Temp Readings from Last 3 Encounters:   12/10/24 97.5 °F (36.4 °C) (Oral)   02/12/24 97.8 °F (36.6 °C) (Oral)    24 97.6 °F (36.4 °C) (Oral)       Pulse Readings from Last 3 Encounters:   12/10/24 106   10/30/24 89   09/10/24 92       BP Readings from Last 3 Encounters:   12/10/24 (!) 168/123   10/30/24 130/80   09/10/24 132/82       Weight:  Wt Readings from Last 3 Encounters:   24 106 kg (233 lb 10.2 oz)   24 106 kg (233 lb 11 oz)   10/30/24 96.2 kg (212 lb)       INS/OUTS:  I/O last 3 completed shifts:  In: 270 [P.O.:270]  Out: 975 [Urine:975]  No intake/output data recorded.    Past Medical & Surgical History:  Past Medical History:   Diagnosis Date    Anemia, unspecified     Arthritis     Asthma     COPD (chronic obstructive pulmonary disease)     Encounter for blood transfusion     Hypertension     Obese body habitus     Wound infection 2019    Right knee       Past Surgical History:   Procedure Laterality Date    ANGIOGRAM, CORONARY, WITH LEFT HEART CATHETERIZATION N/A 2022    Procedure: Angiogram, Coronary, with Left Heart Cath;  Surgeon: Jorge Tran MD;  Location: [facility] CATH/EP LAB;  Service: Cardiology;  Laterality: N/A;     SECTION      ECHOCARDIOGRAM,TRANSESOPHAGEAL N/A 2023    Procedure: Transesophageal echo (MARIANO) intra-procedure log documentation;  Surgeon: Provider, Araceli Diagnostic;  Location: [facility] EP LAB;  Service: Cardiology;  Laterality: N/A;    INCISION AND DRAINAGE OF HEMATOMA Left 2024    Procedure: INCISION AND DRAINAGE, HEMATOMA;  Surgeon: Bryson Huerta MD;  Location: [facility] OR;  Service: Orthopedics;  Laterality: Left;    JOINT REPLACEMENT      Knee    KNEE ARTHROPLASTY Right 2019    Procedure: ARTHROPLASTY, KNEE;  Surgeon: Delio Chung MD;  Location: [facility] OR;  Service: Orthopedics;  Laterality: Right;  anesthesia:  general and block    REPLACEMENT OF WOUND VACUUM-ASSISTED CLOSURE DEVICE Right 2019    Procedure: REPLACEMENT, WOUND VAC;  Surgeon: Delio Chung MD;  Location: [facility] OR;  Service: Orthopedics;  Laterality: Right;     TONSILLECTOMY      TREATMENT OF CARDIAC ARRHYTHMIA N/A 8/31/2023    Procedure: Cardioversion or Defibrillation;  Surgeon: PJ Betancourt MD;  Location: [facility] EP LAB;  Service: Cardiology;  Laterality: N/A;  AF, MARIANO, DCCV, MAC, EH, 3 Prep    VENTRICULOGRAM, LEFT  12/23/2022    Procedure: Ventriculogram, Left;  Surgeon: Jorge Tran MD;  Location: [facility] CATH/EP LAB;  Service: Cardiology;;       Past Social History:  Social History     Socioeconomic History    Marital status:    Tobacco Use    Smoking status: Every Day     Current packs/day: 0.25     Average packs/day: 0.5 packs/day for 49.9 years (23.7 ttl pk-yrs)     Types: Cigarettes     Start date: 1975     Passive exposure: Current    Smokeless tobacco: Never    Tobacco comments:     Pt states she is a 46 year smoker, smokes around 5-8 cigarettes per day. Interested in nicotine replacement while admitted. Information given on outpatient smoking cessation.   Substance and Sexual Activity    Alcohol use: Yes     Comment: RARELY    Drug use: Never    Sexual activity: Not Currently     Partners: Male     Social Drivers of Health     Financial Resource Strain: Patient Declined (12/8/2024)    Overall Financial Resource Strain (CARDIA)     Difficulty of Paying Living Expenses: Patient declined   Food Insecurity: Patient Declined (12/8/2024)    Hunger Vital Sign     Worried About Running Out of Food in the Last Year: Patient declined     Ran Out of Food in the Last Year: Patient declined   Transportation Needs: Patient Declined (12/8/2024)    TRANSPORTATION NEEDS     Transportation : Patient declined   Physical Activity: Patient Declined (12/23/2022)    Exercise Vital Sign     Days of Exercise per Week: Patient declined     Minutes of Exercise per Session: Patient declined   Stress: Patient Declined (12/8/2024)    Citizen of [place] [employer] - Occupational Stress Questionnaire     Feeling of Stress : Patient declined   Housing Stability: Patient  Declined (12/8/2024)    Housing Stability Vital Sign     Unable to Pay for Housing in the Last Year: Patient declined     Homeless in the Last Year: Patient declined       Medications:  Scheduled Meds:   albuterol-ipratropium  3 mL Nebulization Q4H    amiodarone  200 mg Oral Daily    amLODIPine  5 mg Oral Daily    apixaban  5 mg Oral BID    aspirin  81 mg Oral Daily    atorvastatin  40 mg Oral QHS    doxycycline  100 mg Oral Q12H    metoprolol succinate  25 mg Oral Daily    mirtazapine  15 mg Oral QHS    nicotine  1 patch Transdermal Daily     Continuous Infusions:  PRN Meds:.  Current Facility-Administered Medications:     acetaminophen, 650 mg, Oral, Q6H PRN    gabapentin, 300 mg, Oral, Nightly PRN    hydrALAZINE, 10 mg, Intravenous, Q4H PRN    HYDROcodone-acetaminophen, 1 tablet, Oral, Q6H PRN    ondansetron, 4 mg, Intravenous, Q6H PRN  Current Facility-Administered Medications on File Prior to Encounter   Medication Dose Route Frequency Provider Last Rate Last Admin    lidocaine (PF) 10 mg/ml (1%) injection 5 mg  0.5 mL Intradermal Once Azeem Anderson MD         Current Outpatient Medications on File Prior to Encounter   Medication Sig Dispense Refill    amiodarone (PACERONE) 200 MG Tab Take 1 tablet (200 mg total) by mouth once daily. 30 tablet 11    apixaban (ELIQUIS) 5 mg Tab Take 5 mg by mouth 2 (two) times daily.      atorvastatin (LIPITOR) 10 MG tablet Take 1 tablet (10 mg total) by mouth every evening. 90 tablet 3    buPROPion (WELLBUTRIN SR) 150 MG TBSR 12 hr tablet Take 1 tablet (150 mg total) by mouth 2 (two) times daily. 180 tablet 3    FLUoxetine 40 MG capsule Take 1 capsule (40 mg total) by mouth once daily. 90 capsule 3    gabapentin (NEURONTIN) 300 MG capsule Take 1 capsule (300 mg total) by mouth 3 (three) times daily. (Patient taking differently: Take 300 mg by mouth nightly as needed (Pain).) 270 capsule 3    HYDROcodone-acetaminophen (NORCO) 5-325 mg per tablet Take 1 tablet by mouth  "every 6 (six) hours as needed for Pain. 90 tablet 0    loratadine (CLARITIN) 10 mg tablet Take 10 mg by mouth once daily.      metoprolol succinate (TOPROL-XL) 25 MG 24 hr tablet Take 1 tablet (25 mg total) by mouth once daily. 90 tablet 3    mirtazapine (REMERON) 15 MG tablet Take 1 tablet (15 mg total) by mouth every evening. For sleep 30 tablet 3    mupirocin (BACTROBAN) 2 % ointment Apply topically 2 (two) times daily. To infected skin tear (Patient taking differently: Apply 1 g topically 2 (two) times daily. To infected skin tear) 22 g 1    triamcinolone acetonide 0.1% (KENALOG) 0.1 % cream Apply topically 2 (two) times daily. (Patient taking differently: Apply 1 g topically 2 (two) times daily.) 60 g 2    amLODIPine (NORVASC) 2.5 MG tablet Take 1 tablet (2.5 mg total) by mouth every evening. (Patient not taking: Reported on 12/8/2024) 90 tablet 3    betamethasone dipropionate 0.05 % cream Apply topically 2 (two) times daily. (Patient not taking: Reported on 12/8/2024) 45 g 2    HYDROcodone-acetaminophen (NORCO) 5-325 mg per tablet Take 1 tablet by mouth every 6 (six) hours as needed for Pain. 90 tablet 0    HYDROcodone-acetaminophen (NORCO) 5-325 mg per tablet Take 1 tablet by mouth every 6 (six) hours as needed for Pain. 90 tablet 0    methocarbamoL (ROBAXIN) 750 MG Tab Take 1 tablet (750 mg total) by mouth nightly as needed (muscle pain/spasms). (Patient not taking: Reported on 12/8/2024) 20 tablet 1    traZODone (DESYREL) 50 MG tablet Take 2 tablets (100 mg total) by mouth nightly as needed for Insomnia. (Patient not taking: Reported on 12/8/2024) 60 tablet 2       Allergies:  Patient has no known allergies.    Past Family History:  Reviewed; refer to Hospitalist Admission Note    Review of Systems:  Review of Systems - All 14 systems reviewed and negative, except as noted in HPI    Physical Exam:  BP (!) 168/123   Pulse 106   Temp 97.5 °F (36.4 °C) (Oral)   Resp 18   Ht 5' 3" (1.6 m)   Wt 106 kg (233 " lb 10.2 oz)   SpO2 96%   BMI 41.39 kg/m²     General Appearance:    NAD, AAO x 3, cooperative, appears stated age   Head:    Normocephalic, atraumatic   Eyes:    PER, EOMI, and conjunctiva/sclera clear bilaterally        Mouth:   Moist mucus membranes, no thrush or oral lesions, normal      dentition   Back:     No CVA tenderness   Lungs:     Clear to auscultation bilaterally, no wheeze, crackles, rales      or rhonchi, symmetric air movement, respirations unlabored   Heart:    Regular rate and rhythm, S1 and S2 normal, no murmur, rub   or gallop   Abdomen:     Soft, non-tender, non-distended, bowel sounds active all four   quadrants, no RT or guarding, no masses, no organomegaly   Extremities:   Warm and well perfused, distal pulses intact, no cyanosis or    peripheral edema   MSK:   No joint or muscle swelling, tenderness or deformity   Skin:   Skin color, texture, turgor normal, no rashes or lesions   Neurologic/Psychiatric:   CNII-XII intact, normal strength and sensation       throughout, no asterixis; normal affect, memory, judgement     and insight     Results:  Lab Results   Component Value Date     12/10/2024    K 3.9 12/10/2024     12/10/2024    CO2 26 12/10/2024    BUN 23 12/10/2024    CREATININE 2.0 (H) 12/10/2024    CALCIUM 8.6 (L) 12/10/2024    ANIONGAP 7 (L) 12/10/2024    ESTGFRAFRICA 52 (L) 04/25/2022    EGFRNONAA 45 (L) 04/25/2022       Lab Results   Component Value Date    CALCIUM 8.6 (L) 12/10/2024    PHOS 4.1 09/05/2019       Recent Labs   Lab 12/10/24  0435   WBC 7.13   RBC 3.31*   HGB 8.6*   HCT 29.6*      MCV 89   MCH 26.0*   MCHC 29.1*       I have personally reviewed pertinent radiological imaging and reports from this admission.    I have spent > 70 minutes providing care for this patient for the above diagnoses. These services have included chart/data/imaging review, evaluation, exam, formulation of plan, , note preparation, and discussions with staff  involved in this patient's care.    Cristina Hahn NP    [facility] [address]  [place] [address]  [phone] (p)  [phone] (f)

## 2024-12-10 NOTE — PT/OT/SLP PROGRESS
Occupational Therapy   Treatment    Name: Iris Mueller  MRN: [id number]  Admitting Diagnosis:  AMS (altered mental status)       Recommendations:     Discharge Recommendations: Moderate Intensity Therapy  Discharge Equipment Recommendations:  to be determined by next level of care  Barriers to discharge:  Decreased caregiver support    Assessment:     Iris Mueller is a 69 y.o. female with a medical diagnosis of AMS (altered mental status). Performance deficits affecting function are weakness, impaired endurance, impaired self care skills, impaired functional mobility, gait instability, impaired balance, decreased lower extremity function, decreased coordination, impaired skin, decreased safety awareness, impaired cardiopulmonary response to activity.     Rehab Prognosis:  Good; patient would benefit from acute skilled OT services to address these deficits and reach maximum level of function.       Plan:     Patient to be seen 5 x/week to address the above listed problems via self-care/home management, therapeutic exercises, therapeutic activities  Plan of Care Expires: 01/08/25  Plan of Care Reviewed with: patient    Subjective     Chief Complaint: none  Patient/Family Comments/goals: none  Pain/Comfort:  Pain Rating 1: 0/10  Pain Rating Post-Intervention 1: 0/10    Objective:     Communicated with: nurse Alexis prior to session.  Patient found HOB elevated with bed alarm, oxygen, PureWick, telemetry upon OT entry to room.    General Precautions: Standard, fall    Orthopedic Precautions:N/A  Braces: N/A  Respiratory Status: Nasal cannula, flow 2 L/min     Occupational Performance:     Bed Mobility:    Patient completed Scooting/Bridging with stand by assistance  Patient completed Supine to Sit with stand by assistance     Functional Mobility/Transfers:  Patient completed Sit <> Stand Transfer with stand by assistance  with  rolling walker   Patient completed Bed <> Chair Transfer using Stand  Pivot technique with stand by assistance with rolling walker  Patient completed Toilet Transfer Stand Pivot technique with stand by assistance with  rolling walker    Activities of Daily Living:  Lower Body Dressing: stand by assistance to don/doff socks seated EOB  Toileting: Purewick in place      [facility] 6 Click ADL:      Treatment & Education:  OT ed patient on safety with walker use for functional mobility with cues for hand placement & sequencing.       Patient left up in chair with all lines intact, call button in reach, chair alarm on, and friend present    GOALS:   Multidisciplinary Problems       Occupational Therapy Goals          Problem: Occupational Therapy    Goal Priority Disciplines Outcome Interventions   Occupational Therapy Goal     OT, PT/OT Progressing    Description: Goals to be met by: 1/8/2024     Patient will increase functional independence with ADLs by performing:    UE Dressing with Modified [place].  LE Dressing with Supervision and Assistive Devices as needed.  Grooming while standing at sink with Supervision.  Toileting from bedside commode with Supervision for hygiene and clothing management.   Supine to sit with Modified [place].  Step transfer with Supervision  Toilet transfer to toilet with Supervision.                         Time Tracking:     OT Date of Treatment: 12/10/24  OT Start Time: 1100  OT Stop Time: 1117  OT Total Time (min): 17 min    Billable Minutes:Self Care/Home Management 17    OT/EMI: OT          12/10/2024

## 2024-12-10 NOTE — PT/OT/SLP PROGRESS
Physical Therapy Treatment    Patient Name:  Iris Mueller   MRN:  [id number]    Recommendations:     Discharge Recommendations: Moderate Intensity Therapy  Discharge Equipment Recommendations: none  Barriers to discharge:  increased assist with mobility, decreased activity tolerance, balance deficits    Assessment:     Iris Mueller is a 69 y.o. female admitted with a medical diagnosis of AMS (altered mental status).  She presents with the following impairments/functional limitations: weakness, impaired endurance, impaired functional mobility, gait instability, impaired balance, impaired cognition, decreased lower extremity function, decreased safety awareness, impaired skin, impaired cardiopulmonary response to activity.    Pt up in chair, sleeping, with visitor present. Pt easily roused and agreeable to visit.    Pt performed sit to stand transfer with min assist and RW. Pt ambulated 20' with RW and min assist. Pt with flexed posture and knees threatening to buckle.    Pt returned to chair at end of session.    Rehab Prognosis: Fair; patient would benefit from acute skilled PT services to address these deficits and reach maximum level of function.    Recent Surgery: * No surgery found *      Plan:     During this hospitalization, patient to be seen 6 x/week to address the identified rehab impairments via gait training, therapeutic activities, therapeutic exercises, neuromuscular re-education and progress toward the following goals:    Plan of Care Expires:  12/30/24    Subjective     Chief Complaint: pt reports her knees buckle and that is why she falls at home  Patient/Family Comments/goals: to get better  Pain/Comfort:  Pain Rating 1: 0/10      Objective:     Communicated with nurse prior to session.  Patient found up in chair with telemetry, oxygen, PureWick, chair check upon PT entry to room.     General Precautions: Standard, fall  Orthopedic Precautions: N/A  Braces: N/A  Respiratory  Status: Nasal cannula, flow 2 L/min     Functional Mobility:  Transfers:     Sit to Stand:  minimum assistance with rolling walker  Gait: x 20' with RW and [place], knees threatening to buckle      AM-PAC 6 CLICK MOBILITY          Treatment & Education:  Pt educated on importance of time OOB, importance of intermittent mobility, safe techniques for transfers/ambulation, discharge recommendations/options, and use of call light for assistance and fall prevention.      Patient left up in chair with all lines intact, call button in reach, and chair alarm on..    GOALS:   Multidisciplinary Problems       Physical Therapy Goals          Problem: Physical Therapy    Goal Priority Disciplines Outcome Interventions   Physical Therapy Goal     PT, PT/OT Progressing    Description: Goals to be met by: 2024     Patient will increase functional independence with mobility by performin. Supine to sit with Moderate Assistance  2. Sit to stand transfer with Moderate Assistance  3. Bed to chair transfer with Moderate Assistance using Rolling Walker  4. Gait  x 50 feet with Moderate Assistance using Rolling Walker.   5. Lower extremity exercise program x20 reps                        Time Tracking:     PT Received On: 12/10/24  PT Start Time: 1144     PT Stop Time: 1155  PT Total Time (min): 11 min     Billable Minutes: Gait Training 11    Treatment Type: Treatment  PT/PTA: PTA     Number of PTA visits since last PT visit: 1     12/10/2024

## 2024-12-10 NOTE — PLAN OF CARE
KINGA called Barbara pt's daughter to discuss SNF preference. Per Armando she wants to talk to her brother before she makes a final decision but she believes that [facility] will be first choice and [facility] will be second. Per Barbara she is calling her brother and will call KINGA back. KINGA called Katherine with [facility] and she stated she received the email from KINGA yesterday and is awaiting auth for SNF currently.     12/10/24 1226   Post-Acute Status   Post-Acute Authorization Placement   Post-Acute Placement Status Patient List Provided   Discharge Plan   Discharge Plan A Skilled Nursing Facility       1428- Per pt's daughter [facility] is their first choice and [facility] is their second choice. KINGA notified Katherine with [facility] and Penny at [facility].     1534- SNF approval auth SRN [id number]. Penny with [facility] notified.

## 2024-12-11 LAB
ALBUMIN SERPL BCP-MCNC: 3.6 G/DL (ref 3.5–5.2)
ALP SERPL-CCNC: 141 U/L (ref 55–135)
ALT SERPL W/O P-5'-P-CCNC: 10 U/L (ref 10–44)
ANION GAP SERPL CALC-SCNC: 8 MMOL/L (ref 8–16)
AST SERPL-CCNC: 12 U/L (ref 10–40)
BASOPHILS # BLD AUTO: 0.02 K/UL (ref 0–0.2)
BASOPHILS NFR BLD: 0.3 % (ref 0–1.9)
BILIRUB SERPL-MCNC: 0.8 MG/DL (ref 0.1–1)
BUN SERPL-MCNC: 20 MG/DL (ref 8–23)
CALCIUM SERPL-MCNC: 8.7 MG/DL (ref 8.7–10.5)
CHLORIDE SERPL-SCNC: 103 MMOL/L (ref 95–110)
CO2 SERPL-SCNC: 25 MMOL/L (ref 23–29)
CREAT SERPL-MCNC: 1.8 MG/DL (ref 0.5–1.4)
DIFFERENTIAL METHOD BLD: ABNORMAL
EOSINOPHIL # BLD AUTO: 0.2 K/UL (ref 0–0.5)
EOSINOPHIL NFR BLD: 3 % (ref 0–8)
ERYTHROCYTE [DISTWIDTH] IN BLOOD BY AUTOMATED COUNT: 19 % (ref 11.5–14.5)
EST. GFR  (NO RACE VARIABLE): 29.8 ML/MIN/1.73 M^2
GLUCOSE SERPL-MCNC: 87 MG/DL (ref 70–110)
HCT VFR BLD AUTO: 31 % (ref 37–48.5)
HGB BLD-MCNC: 8.9 G/DL (ref 12–16)
IMM GRANULOCYTES # BLD AUTO: 0.05 K/UL (ref 0–0.04)
IMM GRANULOCYTES NFR BLD AUTO: 0.7 % (ref 0–0.5)
LYMPHOCYTES # BLD AUTO: 0.5 K/UL (ref 1–4.8)
LYMPHOCYTES NFR BLD: 7 % (ref 18–48)
MAGNESIUM SERPL-MCNC: 1.6 MG/DL (ref 1.6–2.6)
MCH RBC QN AUTO: 26.6 PG (ref 27–31)
MCHC RBC AUTO-ENTMCNC: 28.7 G/DL (ref 32–36)
MCV RBC AUTO: 93 FL (ref 82–98)
MONOCYTES # BLD AUTO: 0.6 K/UL (ref 0.3–1)
MONOCYTES NFR BLD: 8.4 % (ref 4–15)
NEUTROPHILS # BLD AUTO: 5.9 K/UL (ref 1.8–7.7)
NEUTROPHILS NFR BLD: 80.6 % (ref 38–73)
NRBC BLD-RTO: 0 /100 WBC
PLATELET # BLD AUTO: 238 K/UL (ref 150–450)
PMV BLD AUTO: 9 FL (ref 9.2–12.9)
POTASSIUM SERPL-SCNC: 4 MMOL/L (ref 3.5–5.1)
PROT SERPL-MCNC: 6.3 G/DL (ref 6–8.4)
RBC # BLD AUTO: 3.34 M/UL (ref 4–5.4)
SODIUM SERPL-SCNC: 136 MMOL/L (ref 136–145)
TB INDURATION 48 - 72 HR READ: NORMAL
TB SKIN TEST 48 - 72 HR READ: NORMAL
WBC # BLD AUTO: 7.27 K/UL (ref 3.9–12.7)

## 2024-12-11 PROCEDURE — 94761 N-INVAS EAR/PLS OXIMETRY MLT: CPT

## 2024-12-11 PROCEDURE — 25000003 PHARM REV CODE 250: Performed by: INTERNAL MEDICINE

## 2024-12-11 PROCEDURE — 21400001 HC TELEMETRY ROOM

## 2024-12-11 PROCEDURE — 94640 AIRWAY INHALATION TREATMENT: CPT

## 2024-12-11 PROCEDURE — 94664 DEMO&/EVAL PT USE INHALER: CPT

## 2024-12-11 PROCEDURE — 85025 COMPLETE CBC W/AUTO DIFF WBC: CPT | Performed by: INTERNAL MEDICINE

## 2024-12-11 PROCEDURE — 83735 ASSAY OF MAGNESIUM: CPT | Performed by: INTERNAL MEDICINE

## 2024-12-11 PROCEDURE — 97530 THERAPEUTIC ACTIVITIES: CPT

## 2024-12-11 PROCEDURE — 97535 SELF CARE MNGMENT TRAINING: CPT

## 2024-12-11 PROCEDURE — S4991 NICOTINE PATCH NONLEGEND: HCPCS | Performed by: INTERNAL MEDICINE

## 2024-12-11 PROCEDURE — 99900035 HC TECH TIME PER 15 MIN (STAT)

## 2024-12-11 PROCEDURE — 80053 COMPREHEN METABOLIC PANEL: CPT | Performed by: INTERNAL MEDICINE

## 2024-12-11 PROCEDURE — 99900031 HC PATIENT EDUCATION (STAT)

## 2024-12-11 PROCEDURE — 25000242 PHARM REV CODE 250 ALT 637 W/ HCPCS: Performed by: INTERNAL MEDICINE

## 2024-12-11 PROCEDURE — 36415 COLL VENOUS BLD VENIPUNCTURE: CPT | Performed by: INTERNAL MEDICINE

## 2024-12-11 PROCEDURE — 27000221 HC OXYGEN, UP TO 24 HOURS

## 2024-12-11 RX ORDER — CEPHALEXIN 250 MG/1
250 CAPSULE ORAL 4 TIMES DAILY
Qty: 28 CAPSULE | Refills: 0 | Status: SHIPPED | OUTPATIENT
Start: 2024-12-11 | End: 2024-12-18

## 2024-12-11 RX ORDER — AMLODIPINE BESYLATE 2.5 MG/1
10 TABLET ORAL DAILY
Qty: 90 TABLET | Refills: 3 | Status: SHIPPED | OUTPATIENT
Start: 2024-12-11 | End: 2024-12-11

## 2024-12-11 RX ORDER — GABAPENTIN 300 MG/1
300 CAPSULE ORAL NIGHTLY PRN
Qty: 30 CAPSULE | Refills: 0 | Status: SHIPPED | OUTPATIENT
Start: 2024-12-11 | End: 2024-12-11

## 2024-12-11 RX ORDER — AMLODIPINE BESYLATE 2.5 MG/1
10 TABLET ORAL DAILY
Qty: 90 TABLET | Refills: 3 | Status: SHIPPED | OUTPATIENT
Start: 2024-12-11

## 2024-12-11 RX ORDER — FUROSEMIDE 20 MG/1
20 TABLET ORAL
Qty: 12 TABLET | Refills: 0 | Status: SHIPPED | OUTPATIENT
Start: 2024-12-13 | End: 2025-01-12

## 2024-12-11 RX ORDER — NAPROXEN SODIUM 220 MG/1
81 TABLET, FILM COATED ORAL DAILY
Qty: 30 TABLET | Refills: 0 | Status: SHIPPED | OUTPATIENT
Start: 2024-12-11 | End: 2025-01-10

## 2024-12-11 RX ORDER — FUROSEMIDE 20 MG/1
20 TABLET ORAL
Status: DISCONTINUED | OUTPATIENT
Start: 2024-12-11 | End: 2024-12-17 | Stop reason: HOSPADM

## 2024-12-11 RX ORDER — HYDRALAZINE HYDROCHLORIDE 25 MG/1
25 TABLET, FILM COATED ORAL EVERY 12 HOURS
Qty: 60 TABLET | Refills: 0 | Status: SHIPPED | OUTPATIENT
Start: 2024-12-11 | End: 2024-12-17 | Stop reason: HOSPADM

## 2024-12-11 RX ORDER — HYDRALAZINE HYDROCHLORIDE 25 MG/1
25 TABLET, FILM COATED ORAL EVERY 8 HOURS
Status: DISCONTINUED | OUTPATIENT
Start: 2024-12-11 | End: 2024-12-12

## 2024-12-11 RX ORDER — HYDROCODONE BITARTRATE AND ACETAMINOPHEN 5; 325 MG/1; MG/1
1 TABLET ORAL EVERY 6 HOURS PRN
Qty: 10 TABLET | Refills: 0 | Status: SHIPPED | OUTPATIENT
Start: 2024-12-11 | End: 2024-12-11

## 2024-12-11 RX ORDER — NAPROXEN SODIUM 220 MG/1
81 TABLET, FILM COATED ORAL DAILY
Qty: 30 TABLET | Refills: 0 | Status: SHIPPED | OUTPATIENT
Start: 2024-12-11 | End: 2024-12-11

## 2024-12-11 RX ORDER — HYDROCODONE BITARTRATE AND ACETAMINOPHEN 5; 325 MG/1; MG/1
1 TABLET ORAL EVERY 6 HOURS PRN
Qty: 10 TABLET | Refills: 0 | Status: SHIPPED | OUTPATIENT
Start: 2024-12-11 | End: 2024-12-17

## 2024-12-11 RX ORDER — HYDRALAZINE HYDROCHLORIDE 25 MG/1
25 TABLET, FILM COATED ORAL EVERY 12 HOURS
Status: DISCONTINUED | OUTPATIENT
Start: 2024-12-11 | End: 2024-12-11

## 2024-12-11 RX ORDER — ATORVASTATIN CALCIUM 40 MG/1
40 TABLET, FILM COATED ORAL NIGHTLY
Qty: 30 TABLET | Refills: 0 | Status: SHIPPED | OUTPATIENT
Start: 2024-12-11 | End: 2025-01-10

## 2024-12-11 RX ORDER — DOXYCYCLINE 100 MG/1
100 CAPSULE ORAL EVERY 12 HOURS
Qty: 12 CAPSULE | Refills: 0 | Status: SHIPPED | OUTPATIENT
Start: 2024-12-11 | End: 2024-12-11

## 2024-12-11 RX ORDER — DOXYCYCLINE 100 MG/1
100 CAPSULE ORAL EVERY 12 HOURS
Qty: 12 CAPSULE | Refills: 0 | Status: SHIPPED | OUTPATIENT
Start: 2024-12-11 | End: 2024-12-17

## 2024-12-11 RX ORDER — GABAPENTIN 300 MG/1
300 CAPSULE ORAL NIGHTLY PRN
Qty: 30 CAPSULE | Refills: 0 | Status: SHIPPED | OUTPATIENT
Start: 2024-12-11 | End: 2025-01-10

## 2024-12-11 RX ADMIN — HYDRALAZINE HYDROCHLORIDE 25 MG: 25 TABLET ORAL at 08:12

## 2024-12-11 RX ADMIN — FUROSEMIDE 20 MG: 20 TABLET ORAL at 12:12

## 2024-12-11 RX ADMIN — IPRATROPIUM BROMIDE AND ALBUTEROL SULFATE 3 ML: .5; 3 SOLUTION RESPIRATORY (INHALATION) at 04:12

## 2024-12-11 RX ADMIN — HYDROCODONE BITARTRATE AND ACETAMINOPHEN 1 TABLET: 5; 325 TABLET ORAL at 08:12

## 2024-12-11 RX ADMIN — APIXABAN 5 MG: 5 TABLET, FILM COATED ORAL at 09:12

## 2024-12-11 RX ADMIN — IPRATROPIUM BROMIDE AND ALBUTEROL SULFATE 3 ML: .5; 3 SOLUTION RESPIRATORY (INHALATION) at 07:12

## 2024-12-11 RX ADMIN — MIRTAZAPINE 15 MG: 7.5 TABLET, FILM COATED ORAL at 08:12

## 2024-12-11 RX ADMIN — DOXYCYCLINE HYCLATE 100 MG: 100 CAPSULE ORAL at 09:12

## 2024-12-11 RX ADMIN — IPRATROPIUM BROMIDE AND ALBUTEROL SULFATE 3 ML: .5; 3 SOLUTION RESPIRATORY (INHALATION) at 05:12

## 2024-12-11 RX ADMIN — DOXYCYCLINE HYCLATE 100 MG: 100 CAPSULE ORAL at 08:12

## 2024-12-11 RX ADMIN — IPRATROPIUM BROMIDE AND ALBUTEROL SULFATE 3 ML: .5; 3 SOLUTION RESPIRATORY (INHALATION) at 11:12

## 2024-12-11 RX ADMIN — AMLODIPINE BESYLATE 10 MG: 5 TABLET ORAL at 09:12

## 2024-12-11 RX ADMIN — AMIODARONE HYDROCHLORIDE 200 MG: 200 TABLET ORAL at 09:12

## 2024-12-11 RX ADMIN — METOPROLOL SUCCINATE 25 MG: 25 TABLET, FILM COATED, EXTENDED RELEASE ORAL at 09:12

## 2024-12-11 RX ADMIN — NICOTINE 1 PATCH: 14 PATCH, EXTENDED RELEASE TRANSDERMAL at 09:12

## 2024-12-11 RX ADMIN — HYDROCODONE BITARTRATE AND ACETAMINOPHEN 1 TABLET: 5; 325 TABLET ORAL at 12:12

## 2024-12-11 RX ADMIN — HYDRALAZINE HYDROCHLORIDE 25 MG: 25 TABLET ORAL at 12:12

## 2024-12-11 RX ADMIN — ASPIRIN 81 MG 81 MG: 81 TABLET ORAL at 09:12

## 2024-12-11 RX ADMIN — ATORVASTATIN CALCIUM 40 MG: 40 TABLET, FILM COATED ORAL at 08:12

## 2024-12-11 RX ADMIN — APIXABAN 5 MG: 5 TABLET, FILM COATED ORAL at 08:12

## 2024-12-11 RX ADMIN — IPRATROPIUM BROMIDE AND ALBUTEROL SULFATE 3 ML: .5; 3 SOLUTION RESPIRATORY (INHALATION) at 12:12

## 2024-12-11 NOTE — DISCHARGE INSTRUCTIONS
Avoid any use of nonsteroidal anti-inflammatory medications such as ibuprofen, Motrin, Aleve and Naprosyn.    [facility]  Facility Transfer Orders        Admit to:  [facility]    Diagnoses:   Active Hospital Problems    Diagnosis  POA    *AMS (altered mental status) [R41.82]  Yes    Acute cystitis without hematuria [N30.00]  Yes    Hypertensive emergency [I16.1]  Yes    Cellulitis of right lower extremity [L03.115]  Yes    Tobacco dependency [F17.200]  Yes    Chronic combined systolic and diastolic heart failure [I50.42]  Yes    Pulmonary hypertension [I27.20]  Yes    Persistent atrial fibrillation [I48.19]  Yes    Nonrheumatic aortic valve stenosis with regurgitation [I35.2]  Yes    Morbid obesity [E66.01]  Yes    CKD (chronic kidney disease) stage 4, GFR 15-29 ml/min [N18.4]  Yes      Resolved Hospital Problems    Diagnosis Date Resolved POA    CVA (cerebral vascular accident) [I63.9] 12/09/2024 Yes    Nonischemic cardiomyopathy [I42.8] 12/09/2024 Yes    Essential hypertension [I10] 12/09/2024 Yes     Allergies: Review of patient's allergies indicates:  No Known Allergies    Code Status:  Full    Vitals: Routine       Diet: cardiac diet  Patient to monitor daily weight, follow low salt diet and in case if gain more than 3 pounds in 24 hours, please call your doctor for further advice.  Boost Plus can with meals    Activity:  Up with assist, observe fall precautions, use walker for ambulation    Nursing Precautions: Aspiration , Fall, Seizure, and Pressure ulcer prevention    Bed/Surface: Low Air Loss    Consults: PT to evaluate and treat- 5 times a week and OT to evaluate and treat- 5 times a week    Oxygen:  None    Dialysis: Patient is not on dialysis.     Labs:  CBC and CMP in 7 days  Pending Diagnostic Studies:       None          Imaging:  None    Miscellaneous Care:   Skin care    IV Access:  None     Medications: Discontinue all previous medication orders, if any. See  new list below.       Medication List        START taking these medications      aspirin 81 MG Chew  Take 1 tablet (81 mg total) by mouth once daily.     cephALEXin 250 MG capsule  Commonly known as: KEFLEX  Take 1 capsule (250 mg total) by mouth 4 (four) times daily. for 7 days     doxycycline 100 MG Cap  Commonly known as: VIBRAMYCIN  Take 1 capsule (100 mg total) by mouth every 12 (twelve) hours. for 6 days     furosemide 20 MG tablet  Commonly known as: LASIX  Take 1 tablet (20 mg total) by mouth every Mon, Wed, Fri.     isosorbide dinitrate 20 MG tablet  Commonly known as: ISORDIL  Take 1 tablet (20 mg total) by mouth 3 (three) times daily.     magnesium oxide 400 mg (241.3 mg magnesium) tablet  Commonly known as: MAG-OX  Take 1 tablet (400 mg total) by mouth once daily.     nicotine 21 mg/24 hr  Commonly known as: NICODERM CQ  Place 1 patch onto the skin once daily. for 14 days            CHANGE how you take these medications      amLODIPine 2.5 MG tablet  Commonly known as: NORVASC  Take 4 tablets (10 mg total) by mouth once daily.  What changed:   how much to take  when to take this     atorvastatin 40 MG tablet  Commonly known as: LIPITOR  Take 1 tablet (40 mg total) by mouth every evening.  What changed:   medication strength  how much to take     gabapentin 300 MG capsule  Commonly known as: NEURONTIN  Take 1 capsule (300 mg total) by mouth nightly as needed (pain).  What changed:   when to take this  reasons to take this     HYDROcodone-acetaminophen 5-325 mg per tablet  Commonly known as: NORCO  Take 1 tablet by mouth every 6 (six) hours as needed for Pain.  What changed: Another medication with the same name was removed. Continue taking this medication, and follow the directions you see here.     metoprolol succinate 50 MG 24 hr tablet  Commonly known as: TOPROL-XL  Take 1 tablet (50 mg total) by mouth 2 (two) times daily.  What changed:   medication strength  how much to take  when to take this      mupirocin 2 % ointment  Commonly known as: BACTROBAN  Apply topically 2 (two) times daily. To infected skin tear  What changed: how much to take     triamcinolone acetonide 0.1% 0.1 % cream  Commonly known as: KENALOG  Apply topically 2 (two) times daily.  What changed: how much to take            CONTINUE taking these medications      amiodarone 200 MG Tab  Commonly known as: PACERONE  Take 1 tablet (200 mg total) by mouth once daily.     buPROPion 150 MG TBSR 12 hr tablet  Commonly known as: WELLBUTRIN SR  Take 1 tablet (150 mg total) by mouth 2 (two) times daily.     ELIQUIS 5 mg Tab  Generic drug: apixaban  Take one tablet by mouth twice daily.     FLUoxetine 40 MG capsule  Take 1 capsule (40 mg total) by mouth once daily.     mirtazapine 15 MG tablet  Commonly known as: REMERON  Take 1 tablet (15 mg total) by mouth every evening. For sleep            STOP taking these medications      betamethasone dipropionate 0.05 % cream     loratadine 10 mg tablet  Commonly known as: CLARITIN     methocarbamoL 750 MG Tab  Commonly known as: ROBAXIN     traZODone 50 MG tablet  Commonly known as: DESYREL                  Follow up:    Follow-up Information       Elkin You MD Follow up in 1 week(s).    Specialties: Family Medicine, Home Health Services, Hospice Services  Why: Dr. George office will call you to schedule appointment.  Contact information:  [address] [facility]  [address]  [place] [address]  [phone]               Angelo Bond, DO Follow up.    Specialty: Wound Care  Why: As needed wound care needs  Contact information:  [address] [facility] [address]  [place] [address]  [phone]               Roxanne Marin MD Follow up.    Specialties: Vascular Surgery, Cardiology  Why: As needed  Contact information:  [address] [facility] [facility]  [address]  [place] [address]  [phone]               Jorge Tran MD Follow up in 2 week(s).    Specialties: Interventional Cardiology, Cardiology  Why: inbasket message  sent to Dr. Yuen office for a follow up- they will call you.  Contact information:  [facility]  [address] [place], [place] [id number] [phone]                               Immunizations Administered as of 12/17/2024       Name Date Dose VIS Date Route Exp Date    COVID-19, MRNA, LN-S, PF (Moderna) 3/26/2021 -- -- -- --    : [employer]     Lot: [id number]     Comment: Adminis     COVID-19, MRNA, LN-S, PF (Moderna) 2/26/2021 -- -- -- --    : [employer]a [employer]     Lot: [id number]     Comment: Adminis               Yazmin Jules MD

## 2024-12-11 NOTE — PLAN OF CARE
Problem: Adult Inpatient Plan of Care  Goal: Plan of Care Review  Outcome: Progressing  Goal: Patient-Specific Goal (Individualized)  Outcome: Progressing  Goal: Absence of Hospital-Acquired Illness or Injury  Outcome: Progressing  Goal: Optimal Comfort and Wellbeing  Outcome: Progressing  Goal: Readiness for Transition of Care  Outcome: Progressing     Problem: Fall Injury Risk  Goal: Absence of Fall and Fall-Related Injury  Outcome: Progressing     Problem: Infection  Goal: Absence of Infection Signs and Symptoms  Outcome: Progressing     Problem: Wound  Goal: Optimal Coping  Outcome: Progressing  Goal: Optimal Functional Ability  Outcome: Progressing  Goal: Absence of Infection Signs and Symptoms  Outcome: Progressing  Goal: Improved Oral Intake  Outcome: Progressing  Goal: Optimal Pain Control and Function  Outcome: Progressing  Goal: Skin Health and Integrity  Outcome: Progressing  Goal: Optimal Wound Healing  Outcome: Progressing     Problem: Bariatric Environmental Safety  Goal: Safety Maintained with Care  Outcome: Progressing     Problem: Skin Injury Risk Increased  Goal: Skin Health and Integrity  Outcome: Progressing

## 2024-12-11 NOTE — RESPIRATORY THERAPY
12/10/24 1948   Patient Assessment/Suction   Level of Consciousness (AVPU) alert   Respiratory Effort Normal;Unlabored   Expansion/Accessory Muscles/Retractions no retractions;no use of accessory muscles   All Lung Fields Breath Sounds coarse;diminished   Rhythm/Pattern, Respiratory pattern regular;unlabored   Cough Frequency no cough   PRE-TX-O2   Device (Oxygen Therapy) nasal cannula   Flow (L/min) (Oxygen Therapy) 2   SpO2 95 %   Pulse Oximetry Type Intermittent   $ Pulse Oximetry - Multiple Charge Pulse Oximetry - Multiple   Pulse 82   Resp 16   Aerosol Therapy   $ Aerosol Therapy Charges Aerosol Treatment   Daily Review of Necessity (SVN) completed   Respiratory Treatment Status (SVN) given   Treatment Route (SVN) mask;oxygen   Patient Position HOB elevated   Post Treatment Assessment (SVN) breath sounds unchanged   Signs of Intolerance (SVN) none   Education   $ Education Bronchodilator;Oxygen;15 min

## 2024-12-11 NOTE — PROGRESS NOTES
INPATIENT NEPHROLOGY CONSULT   [facility]    Patient Name: Iris Mueller  Date: 12/11/2024    Reason for consultation: CKD IV    Chief Complaint:   Chief Complaint   Patient presents with    Headache     Fell twice today , slurred speech started at 1830       History of Present Illness:  Patient is a 69 year old female with history of HTN, presented with recurrent falls, and slurred speech. Patient was found to be in hypertensive emergency on admission. CT C spine and head was negative. MRI brain was negative for acute findings. Showed punctate focus of susceptibility artifact in the right cerebellum and left basal ganglia representing microhemorrhage or calcification. Neurology was consulted. Patient was started on aspirin and Lipitor for possible TIA. Her home Eliquis was resumed. Patient was also started on PO Doxycycline for right lower extremity cellulitis. Nephrology consulted per family request for CKD IV. Patient continue to have episodes of confusion. Dementia work up sent. Patient worked with PT, OT and recommended SNF.     Interval History:  12/9- work on BP control- then edema management  12/10  975 ml UOP recorded.  BP starting to improve--still high at times.  Na+ improved.  Renal function stable.  Sleepy, is able to converse some, though.  12/11  one shift  ml.  BP meds adjusted yesterday, BP still high.  renal function stable.  Hydralazine and lasix added today.  Pt up w/OT, feeling better.    Plan of Care:    Assessment:  CKD IV  HTN emerg/TIA  Edema  Hyponatremia  SHPT  Anemia of CKD    Plan:    - renal function at baseline  - not uremic- mental status issues combo of underlying age related and vascular dementia chronically and HTN emerg causing TIA acutely  - agree with norvasc  - may add lasix tomorrow  - 2g salt, 1.5L fluid per day  - no active BMM issues  - supplement iron- DENEEN when BP improved    Thank you for allowing us to participate in this patient's  care. We will continue to follow.    Vital Signs:  Temp Readings from Last 3 Encounters:   24 98 °F (36.7 °C) (Oral)   24 97.8 °F (36.6 °C) (Oral)   24 97.6 °F (36.4 °C) (Oral)       Pulse Readings from Last 3 Encounters:   24 84   10/30/24 89   09/10/24 92       BP Readings from Last 3 Encounters:   24 (!) 169/101   10/30/24 130/80   09/10/24 132/82       Weight:  Wt Readings from Last 3 Encounters:   24 106 kg (233 lb 10.2 oz)   24 106 kg (233 lb 11 oz)   10/30/24 96.2 kg (212 lb)       INS/OUTS:  I/O last 3 completed shifts:  In: 840 [P.O.:840]  Out: 1250 [Urine:1250]  No intake/output data recorded.    Past Medical & Surgical History:  Past Medical History:   Diagnosis Date    Anemia, unspecified     Arthritis     Asthma     COPD (chronic obstructive pulmonary disease)     Encounter for blood transfusion     Hypertension     Obese body habitus     Wound infection 2019    Right knee       Past Surgical History:   Procedure Laterality Date    ANGIOGRAM, CORONARY, WITH LEFT HEART CATHETERIZATION N/A 2022    Procedure: Angiogram, Coronary, with Left Heart Cath;  Surgeon: Jorge Tran MD;  Location: [facility] CATH/EP LAB;  Service: Cardiology;  Laterality: N/A;     SECTION      ECHOCARDIOGRAM,TRANSESOPHAGEAL N/A 2023    Procedure: Transesophageal echo (MARIANO) intra-procedure log documentation;  Surgeon: Araceli Sue Diagnostic;  Location: [facility] EP LAB;  Service: Cardiology;  Laterality: N/A;    INCISION AND DRAINAGE OF HEMATOMA Left 2024    Procedure: INCISION AND DRAINAGE, HEMATOMA;  Surgeon: Bryson Huerta MD;  Location: [facility] OR;  Service: Orthopedics;  Laterality: Left;    JOINT REPLACEMENT      Knee    KNEE ARTHROPLASTY Right 2019    Procedure: ARTHROPLASTY, KNEE;  Surgeon: Delio Chung MD;  Location: [facility] OR;  Service: Orthopedics;  Laterality: Right;  anesthesia:  general and block    REPLACEMENT OF WOUND VACUUM-ASSISTED CLOSURE  DEVICE Right 9/12/2019    Procedure: REPLACEMENT, WOUND VAC;  Surgeon: Delio Chung MD;  Location: [facility] OR;  Service: Orthopedics;  Laterality: Right;    TONSILLECTOMY      TREATMENT OF CARDIAC ARRHYTHMIA N/A 8/31/2023    Procedure: Cardioversion or Defibrillation;  Surgeon: PJ Betancourt MD;  Location: [facility] EP LAB;  Service: Cardiology;  Laterality: N/A;  AF, MARIANO, DCCV, MAC, EH, 3 Prep    VENTRICULOGRAM, LEFT  12/23/2022    Procedure: Ventriculogram, Left;  Surgeon: Jorge Tran MD;  Location: [facility] CATH/EP LAB;  Service: Cardiology;;       Past Social History:  Social History     Socioeconomic History    Marital status:    Tobacco Use    Smoking status: Every Day     Current packs/day: 0.25     Average packs/day: 0.5 packs/day for 49.9 years (23.7 ttl pk-yrs)     Types: Cigarettes     Start date: 1975     Passive exposure: Current    Smokeless tobacco: Never    Tobacco comments:     Pt states she is a 46 year smoker, smokes around 5-8 cigarettes per day. Interested in nicotine replacement while admitted. Information given on outpatient smoking cessation.   Substance and Sexual Activity    Alcohol use: Yes     Comment: RARELY    Drug use: Never    Sexual activity: Not Currently     Partners: Male     Social Drivers of Health     Financial Resource Strain: Patient Declined (12/8/2024)    Overall Financial Resource Strain (CARDIA)     Difficulty of Paying Living Expenses: Patient declined   Food Insecurity: Patient Declined (12/8/2024)    Hunger Vital Sign     Worried About Running Out of Food in the Last Year: Patient declined     Ran Out of Food in the Last Year: Patient declined   Transportation Needs: Patient Declined (12/8/2024)    TRANSPORTATION NEEDS     Transportation : Patient declined   Physical Activity: Patient Declined (12/23/2022)    Exercise Vital Sign     Days of Exercise per Week: Patient declined     Minutes of Exercise per Session: Patient declined   Stress: Patient Declined  (12/8/2024)    Citizen of [place] [employer] - Occupational Stress Questionnaire     Feeling of Stress : Patient declined   Housing Stability: Patient Declined (12/8/2024)    Housing Stability Vital Sign     Unable to Pay for Housing in the Last Year: Patient declined     Homeless in the Last Year: Patient declined       Medications:  Scheduled Meds:   albuterol-ipratropium  3 mL Nebulization Q4H    amiodarone  200 mg Oral Daily    amLODIPine  10 mg Oral Daily    apixaban  5 mg Oral BID    aspirin  81 mg Oral Daily    atorvastatin  40 mg Oral QHS    doxycycline  100 mg Oral Q12H    metoprolol succinate  25 mg Oral Daily    mirtazapine  15 mg Oral QHS    nicotine  1 patch Transdermal Daily     Continuous Infusions:  PRN Meds:.  Current Facility-Administered Medications:     acetaminophen, 650 mg, Oral, Q6H PRN    dextromethorphan-guaiFENesin  mg/5 ml, 15 mL, Oral, Q6H PRN    gabapentin, 300 mg, Oral, Nightly PRN    hydrALAZINE, 10 mg, Intravenous, Q4H PRN    HYDROcodone-acetaminophen, 1 tablet, Oral, Q6H PRN    ondansetron, 4 mg, Intravenous, Q6H PRN  Current Facility-Administered Medications on File Prior to Encounter   Medication Dose Route Frequency Provider Last Rate Last Admin    lidocaine (PF) 10 mg/ml (1%) injection 5 mg  0.5 mL Intradermal Once Azeem Anderson MD         Current Outpatient Medications on File Prior to Encounter   Medication Sig Dispense Refill    amiodarone (PACERONE) 200 MG Tab Take 1 tablet (200 mg total) by mouth once daily. 30 tablet 11    apixaban (ELIQUIS) 5 mg Tab Take 5 mg by mouth 2 (two) times daily.      buPROPion (WELLBUTRIN SR) 150 MG TBSR 12 hr tablet Take 1 tablet (150 mg total) by mouth 2 (two) times daily. 180 tablet 3    FLUoxetine 40 MG capsule Take 1 capsule (40 mg total) by mouth once daily. 90 capsule 3    metoprolol succinate (TOPROL-XL) 25 MG 24 hr tablet Take 1 tablet (25 mg total) by mouth once daily. 90 tablet 3    mirtazapine (REMERON) 15 MG  tablet Take 1 tablet (15 mg total) by mouth every evening. For sleep 30 tablet 3    mupirocin (BACTROBAN) 2 % ointment Apply topically 2 (two) times daily. To infected skin tear 22 g 1    triamcinolone acetonide 0.1% (KENALOG) 0.1 % cream Apply topically 2 (two) times daily. 60 g 2    [DISCONTINUED] atorvastatin (LIPITOR) 10 MG tablet Take 1 tablet (10 mg total) by mouth every evening. 90 tablet 3    [DISCONTINUED] gabapentin (NEURONTIN) 300 MG capsule Take 1 capsule (300 mg total) by mouth 3 (three) times daily. (Patient taking differently: Take 300 mg by mouth nightly as needed (Pain).) 270 capsule 3    [DISCONTINUED] HYDROcodone-acetaminophen (NORCO) 5-325 mg per tablet Take 1 tablet by mouth every 6 (six) hours as needed for Pain. 90 tablet 0    [DISCONTINUED] loratadine (CLARITIN) 10 mg tablet Take 10 mg by mouth once daily.      [DISCONTINUED] amLODIPine (NORVASC) 2.5 MG tablet Take 1 tablet (2.5 mg total) by mouth every evening. (Patient not taking: Reported on 12/8/2024) 90 tablet 3    [DISCONTINUED] betamethasone dipropionate 0.05 % cream Apply topically 2 (two) times daily. (Patient not taking: Reported on 12/8/2024) 45 g 2    [DISCONTINUED] HYDROcodone-acetaminophen (NORCO) 5-325 mg per tablet Take 1 tablet by mouth every 6 (six) hours as needed for Pain. 90 tablet 0    [DISCONTINUED] HYDROcodone-acetaminophen (NORCO) 5-325 mg per tablet Take 1 tablet by mouth every 6 (six) hours as needed for Pain. 90 tablet 0    [DISCONTINUED] methocarbamoL (ROBAXIN) 750 MG Tab Take 1 tablet (750 mg total) by mouth nightly as needed (muscle pain/spasms). (Patient not taking: Reported on 12/8/2024) 20 tablet 1    [DISCONTINUED] traZODone (DESYREL) 50 MG tablet Take 2 tablets (100 mg total) by mouth nightly as needed for Insomnia. (Patient not taking: Reported on 12/8/2024) 60 tablet 2       Allergies:  Patient has no known allergies.    Past Family History:  Reviewed; refer to Hospitalist Admission Note    Review of  "Systems:  Review of Systems - All 14 systems reviewed and negative, except as noted in HPI    Physical Exam:  BP (!) 169/101 (BP Location: Right forearm, Patient Position: Lying)   Pulse 84   Temp 98 °F (36.7 °C) (Oral)   Resp 18   Ht 5' 3" (1.6 m)   Wt 106 kg (233 lb 10.2 oz)   SpO2 96%   BMI 41.39 kg/m²     General Appearance:    NAD, AAO x 3, cooperative, appears stated age   Head:    Normocephalic, atraumatic   Eyes:    PER, EOMI, and conjunctiva/sclera clear bilaterally        Mouth:   Moist mucus membranes, no thrush or oral lesions, normal      dentition   Back:     No CVA tenderness   Lungs:     Clear to auscultation bilaterally, no wheeze, crackles, rales      or rhonchi, symmetric air movement, respirations unlabored   Heart:    Regular rate and rhythm, S1 and S2 normal, no murmur, rub   or gallop   Abdomen:     Soft, non-tender, non-distended, bowel sounds active all four   quadrants, no RT or guarding, no masses, no organomegaly   Extremities:   Warm and well perfused, distal pulses intact, no cyanosis or    peripheral edema   MSK:   No joint or muscle swelling, tenderness or deformity   Skin:   Skin color, texture, turgor normal, no rashes or lesions   Neurologic/Psychiatric:   CNII-XII intact, normal strength and sensation       throughout, no asterixis; normal affect, memory, judgement     and insight     Results:  Lab Results   Component Value Date     12/11/2024    K 4.0 12/11/2024     12/11/2024    CO2 25 12/11/2024    BUN 20 12/11/2024    CREATININE 1.8 (H) 12/11/2024    CALCIUM 8.7 12/11/2024    ANIONGAP 8 12/11/2024    ESTGFRAFRICA 52 (L) 04/25/2022    EGFRNONAA 45 (L) 04/25/2022       Lab Results   Component Value Date    CALCIUM 8.7 12/11/2024    PHOS 4.1 09/05/2019       Recent Labs   Lab 12/11/24  0434   WBC 7.27   RBC 3.34*   HGB 8.9*   HCT 31.0*      MCV 93   MCH 26.6*   MCHC 28.7*       I have personally reviewed pertinent radiological imaging and reports from " this admission.    I have spent > 70 minutes providing care for this patient for the above diagnoses. These services have included chart/data/imaging review, evaluation, exam, formulation of plan, , note preparation, and discussions with staff involved in this patient's care.    Cristina Hahn NP    [facility]  [address] [place] [address]  [place] [address]  [phone] (p)  [phone] (f)

## 2024-12-11 NOTE — ASSESSMENT & PLAN NOTE
With slurred speech and confusion     Home meds for hypertension were reviewed and noted below.   Hypertension Medications               amLODIPine (NORVASC) 2.5 MG tablet Take 1 tablet (2.5 mg total) by mouth every evening.    metoprolol succinate (TOPROL-XL) 25 MG 24 hr tablet Take 1 tablet (25 mg total) by mouth once daily.          Norvasc increased to 10 mg  Cont metoprolol

## 2024-12-11 NOTE — PLAN OF CARE
Penny with [facility] messaged [facility] to let her know she is ready to accept. [facility] messaged provider pt was pending d/c orders     12/11/24 [id number]   Post-Acute Status   Post-Acute Authorization Placement   Post-Acute Placement Status Referrals Sent   Discharge Plan   Discharge Plan [facility] Nursing Facility     1505- K[facility] called Penny there was no answer.

## 2024-12-11 NOTE — CARE UPDATE
12/11/24 0712   Patient Assessment/Suction   Level of Consciousness (AVPU) alert   Respiratory Effort Normal;Unlabored   Expansion/Accessory Muscles/Retractions no use of accessory muscles   All Lung Fields Breath Sounds coarse;wheezes, expiratory   Rhythm/Pattern, Respiratory pattern regular;depth regular   Cough Frequency no cough   PRE-TX-O2   Device (Oxygen Therapy) nasal cannula   $ Is the patient on Low Flow Oxygen? Yes   Flow (L/min) (Oxygen Therapy) 2   SpO2 100 %   Pulse Oximetry Type Intermittent   $ Pulse Oximetry - Multiple Charge Pulse Oximetry - Multiple   Pulse 92   Resp 18   Aerosol Therapy   $ Aerosol Therapy Charges Aerosol Treatment   Daily Review of Necessity (SVN) completed   Respiratory Treatment Status (SVN) given   Treatment Route (SVN) mask;oxygen   Patient Position HOB elevated   Post Treatment Assessment (SVN) breath sounds unchanged   Signs of Intolerance (SVN) none   Breath Sounds Post-Respiratory Treatment   Throughout All Fields Post-Treatment All Fields   Throughout All Fields Post-Treatment aeration increased   Post-treatment Heart Rate (beats/min) 91   Post-treatment Resp Rate (breaths/min) 18   Vibratory PEP Therapy   $ Vibratory PEP Charges Aerobika Therapy   $ Vibratory PEP Equipment Aerobika Equipment   $ Vibratory PEP Tech Time Charges 15 min   Daily Review of Necessity (PEP Therapy) completed   Type (PEP Therapy) vibratory/oscillatory   Device (PEP Therapy) flutter   Route (PEP Therapy) mouthpiece   Breaths per Cycle (PEP Therapy) 10   Cycles (PEP Therapy) 1   Settings (PEP Therapy) PEP 5   Patient Position (PEP Therapy) HOB elevated   Post Treatment Assessment (PEP) increased aeration   Signs of Intolerance (PEP Therapy) none   Education   $ Education Bronchodilator;PEP Therapy;30 min

## 2024-12-11 NOTE — PLAN OF CARE
Discharge Planning    The patient has DC orders in and has been accepted to [facility]. Per MD, it is okay for pt to DC when BP is less than 150. Patient's current Bps is 180's. CM will continue to follow.

## 2024-12-11 NOTE — DISCHARGE SUMMARY
"[facility] Medicine  Discharge Summary      Patient Name: Iris Mueller  MRN: [id number]  BONIFACIO: [id number]  Patient Class: IP- Inpatient  Admission Date: 12/7/2024  Hospital Length of Stay: 3 days  Discharge Date and Time:  12/11/2024 11:05 AM  Attending Physician: Garcia Soriano MD   Discharging Provider: Garcia Soriano MD  Primary Care Provider: Elkin You MD    Primary Care Team: Networked reference to record PCT     HPI:   The patient is a 69-year-old  female with a history of hypertension, CKD-4, atrial fibrillation (on Eliquis), morbid obesity, COPD, hyperlipidemia, CAD, aortic stenosis/regurgitation, stroke, and history of systolic/diastolic congestive heart failure.  Her last echo performed in 2022 showed, per Dr. Wright:  ·   "The estimated ejection fraction is 40%. There is a anterior apical segment that is hypo to akinetic with a wall motion abnormality  · Grade II left ventricular diastolic dysfunction. Mean left atrial pressure 20 mm Hg. Mild mitral annular calcification  · There are segmental left ventricular wall motion abnormalities.  · Normal right ventricular size with normal right ventricular systolic function.  · Moderate left atrial enlargement.  · Moderate aortic regurgitation.  · There is moderate aortic valve stenosis.  · Aortic valve area is 1.55 cm2; peak velocity is 2.88 m/s; mean gradient is 15 mmHg.  · Moderate mitral regurgitation.  · There is mild pulmonary hypertension.  · Mild tricuspid regurgitation.  · Elevated central venous pressure (15 mmHg).  · The estimated PA systolic pressure is 47 mmHg."    Please note the obtaining history from the patient is impossible, given her current mental state.      The patient lives home with her  who has visual and hearing impediment.  Thus, the majority of the history is obtained from the ER physician, records, and 2 adult children at bedside.    Her children at bedside chair " "concern that the patient takes her prescribed narcotics inappropriately.  The patient was brought in tonight because of recurrent falls.  Over the last 2-3 weeks, the patient had complained that she was "off balance" and had fallen 7-8 times.    Around the 630 p.m., family worse talking to the patient on the phone apparently and knows that she had slurred speech and that she was "stuck on the Ottoman inked".  Somehow, the patient reached the neighbor and by the time the children got to the house she was sitting in her recliner (has been moved from the dining room chair, presumably with the assistance of the neighbor).  Thus she was conducted to the ER.      While here she was afebrile but hypertensive at 224/109 and this came down to 146/108.  NIH was 1.    Workup showed that head CT for stroke was negative.  She had multiple imaging studies including bilateral hip x-rays with pelvis, chest x-ray, left humerus, left shoulder, and CT cervical spine without contrast, all of which were unrevealing.    MRI brain without contrast showed (with an addenda, per radiologist Dr. Narvaez):    "Impression:     No acute ischemic process. Moderate to advanced periventricular and deep white matter changes of chronic microvascular ischemia. GRE sequence demonstrates punctate focus of susceptibility artifact in the right cerebellum and left basal ganglia representing microhemorrhage or calcification."    Furthermore, the patient had an MRI brain without contrast which showed, per radiologist Dr. Burnett:    Impression:     "Examination is significantly limited secondary to motion with apparent absence of flow related enhancement within the left middle cerebral artery branches, left vertebral artery, and the left posterior cerebral artery.  This is most likely artifactual.     CTA may be attempted for further evaluation."    She was evaluated by vascular Neurology and my understanding is that she was not considered an intervention " candidate.  Additionally, as her last known normal was not precisely known (as she is already fully anticoagulated with Eliquis), she was not considered to be a tenecteplase candidate, either.  She is admitted for further diagnosis, treatment, and care.    * No surgery found *      Hospital Course:   Patient is a 69 year old female with history of HTN, presented with recurrent falls, and slurred speech. Patient was found to be in hypertensive emergency on admission. CT C spine and head was negative. MRI brain was negative for acute findings. Showed punctate focus of susceptibility artifact in the right cerebellum and left basal ganglia representing microhemorrhage or calcification. Neurology was consulted. Patient was started on aspirin and Lipitor for possible TIA. Her home Eliquis was resumed. Patient was also started on PO Doxycycline for right lower extremity cellulitis. Nephrology consulted per family request for CKD IV. Patient continue to have episodes of confusion. Dementia work up sent. Patient worked with PT, OT and recommended SNF.  Norvasc was increased. BP better controlled. Mental status improved. Patient discharged to [place] for SNF. PO Doxy to continue for 10 days total.      Goals of Care Treatment Preferences:  Code Status: Full Code    Physical Exam  Constitutional:       General: She is not in acute distress.     Appearance: She is obese. She is not ill-appearing, toxic-appearing or diaphoretic.   HENT:      Head: Normocephalic and atraumatic.   Eyes:      General: No scleral icterus.        Right eye: No discharge.         Left eye: No discharge.   Neck:      Comments: No retractions; no nuchal rigidity  Cardiovascular:      Rate and Rhythm: Regular rhythm.      Heart sounds: Murmur (3/6 systolic murmur) heard.      No friction rub. No gallop.   Pulmonary:      Effort: Pulmonary effort is normal.      Breath sounds: Normal breath sounds.   Abdominal:      General: Bowel sounds are normal.  There is no distension.      Palpations: Abdomen is soft. There is no mass.      Tenderness: There is no abdominal tenderness.   Musculoskeletal:      Right lower leg: Edema present improved.      Left lower leg: Edema present improved.   Skin:     General: Skin is warm and dry. Redness on both legs improved   Neurological:      Comments: no focal signs. Oriented to place and person and time.     [facility] Screening:  The patient declined to be screened for utility difficulties, food insecurity, transport difficulties, housing insecurity, and interpersonal safety, so no concerns could be identified this admission.     Consults:   Consults (From admission, onward)          Status Ordering Provider     Inpatient consult to Nephrology  Once        Provider:  Brett Deng MD    Completed IRIS KOWALSKI     Inpatient consult to   Once        Provider:  (Not yet assigned)    Acknowledged NUSRAT GUTIERREZ     Inpatient consult to Neurology  Once        Provider:  Denton Noonan MD    Completed NUSRAT GUTIERREZ     Consult to Telemedicine - Acute Stroke  Once        Provider:  (Not yet assigned)    Acknowledged JUS WINSTON            * AMS (altered mental status)  Acute encephalopathy vs progressive dementia with delirium   MRI negative for acute stroke   Could be related to hypertensive encephalopathy however family states symptoms has been going on for about 2 months   normal B12, folate RPR and ammonia level   Improved on discharge   - Cont aspirin, Lipitor, Eliquis     Hypertensive emergency  With slurred speech and confusion     Home meds for hypertension were reviewed and noted below.   Hypertension Medications               amLODIPine (NORVASC) 2.5 MG tablet Take 1 tablet (2.5 mg total) by mouth every evening.    metoprolol succinate (TOPROL-XL) 25 MG 24 hr tablet Take 1 tablet (25 mg total) by mouth once daily.          Norvasc increased to 10 mg  Cont metoprolol     Cellulitis of right  lower extremity  US DVT ordered  PO Doxycycline D4, 6 more days for total 10 days       Tobacco dependency  Nicotine patch    Chronic combined systolic and diastolic heart failure  As above    Pulmonary hypertension    As above    Nonrheumatic aortic valve stenosis with regurgitation  Echo shows moderate to severe stenosis     Persistent atrial fibrillation  Resume home Amiodarone  Resume metoprolol  Per records, patient has a CHADS-VASc score of 3  Resume Eliquis     Morbid obesity  Body mass index is 41.39 kg/m².   Complicates all aspects of care        CKD (chronic kidney disease) stage 4, GFR 15-29 ml/min  Creatinine at baseline  Minimize nephrotoxic medications  Nephrology consulted per family request   Cr remained stable       Final Active Diagnoses:    Diagnosis Date Noted POA    PRINCIPAL PROBLEM:  AMS (altered mental status) [R41.82] 12/08/2024 Yes    Hypertensive emergency [I16.1] 12/09/2024 Yes    Cellulitis of right lower extremity [L03.115] 12/09/2024 Yes    Tobacco dependency [F17.200] 12/08/2024 Yes    Chronic combined systolic and diastolic heart failure [I50.42] 08/18/2023 Yes    Pulmonary hypertension [I27.20] 08/18/2023 Yes    Persistent atrial fibrillation [I48.19] 06/12/2023 Yes    Nonrheumatic aortic valve stenosis with regurgitation [I35.2] 06/12/2023 Yes    Morbid obesity [E66.01] 09/19/2019 Yes    CKD (chronic kidney disease) stage 4, GFR 15-29 ml/min [N18.4] 09/13/2019 Yes      Problems Resolved During this Admission:    Diagnosis Date Noted Date Resolved POA    CVA (cerebral vascular accident) [I63.9] 12/08/2024 12/09/2024 Yes    Nonischemic cardiomyopathy [I42.8] 06/12/2023 12/09/2024 Yes    Essential hypertension [I10] 08/14/2019 12/09/2024 Yes       Discharged Condition: good    Disposition: Skilled Nursing Facility    Follow Up:   Follow-up Information       Elkin You MD Follow up in 1 week(s).    Specialties: Family Medicine, Home Health Services, Hospice Services  Contact  information:  [address]  [address]  [place] [address]  [phone]                           Patient Instructions:   No discharge procedures on file.    Significant Diagnostic Studies: Labs: CMP   Recent Labs   Lab 12/10/24  0435 12/11/24  0434    136   K 3.9 4.0    103   CO2 26 25   GLU 98 87   BUN 23 20   CREATININE 2.0* 1.8*   CALCIUM 8.6* 8.7   PROT 6.2 6.3   ALBUMIN 3.5 3.6   BILITOT 0.8 0.8   ALKPHOS 135 141*   AST 9* 12   ALT 10 10   ANIONGAP 7* 8    and CBC   Recent Labs   Lab 12/10/24  0435 12/11/24  0434   WBC 7.13 7.27   HGB 8.6* 8.9*   HCT 29.6* 31.0*    238       Pending Diagnostic Studies:       Procedure Component Value Units Date/Time    Vitamin B1 [[id number]] Collected: 12/09/24 1225    Order Status: Sent Lab Status: In process Updated: 12/09/24 1231    Specimen: Blood            Medications:  Reconciled Home Medications:      Medication List        START taking these medications      aspirin 81 MG Chew  Take 1 tablet (81 mg total) by mouth once daily.     cephALEXin 250 MG capsule  Commonly known as: KEFLEX  Take 1 capsule (250 mg total) by mouth 4 (four) times daily. for 7 days     doxycycline 100 MG Cap  Commonly known as: VIBRAMYCIN  Take 1 capsule (100 mg total) by mouth every 12 (twelve) hours. for 6 days            CHANGE how you take these medications      amLODIPine 2.5 MG tablet  Commonly known as: NORVASC  Take 4 tablets (10 mg total) by mouth once daily.  What changed:   how much to take  when to take this     atorvastatin 40 MG tablet  Commonly known as: LIPITOR  Take 1 tablet (40 mg total) by mouth every evening.  What changed:   medication strength  how much to take     gabapentin 300 MG capsule  Commonly known as: NEURONTIN  Take 1 capsule (300 mg total) by mouth nightly as needed (pain).  What changed:   when to take this  reasons to take this     HYDROcodone-acetaminophen 5-325 mg per tablet  Commonly known as: NORCO  Take 1 tablet by mouth every 6 (six)  hours as needed for Pain.  What changed: Another medication with the same name was removed. Continue taking this medication, and follow the directions you see here.     mupirocin 2 % ointment  Commonly known as: BACTROBAN  Apply topically 2 (two) times daily. To infected skin tear  What changed: how much to take     triamcinolone acetonide 0.1% 0.1 % cream  Commonly known as: KENALOG  Apply topically 2 (two) times daily.  What changed: how much to take            CONTINUE taking these medications      amiodarone 200 MG Tab  Commonly known as: PACERONE  Take 1 tablet (200 mg total) by mouth once daily.     buPROPion 150 MG TBSR 12 hr tablet  Commonly known as: WELLBUTRIN SR  Take 1 tablet (150 mg total) by mouth 2 (two) times daily.     ELIQUIS 5 mg Tab  Generic drug: apixaban  Take 5 mg by mouth 2 (two) times daily.     FLUoxetine 40 MG capsule  Take 1 capsule (40 mg total) by mouth once daily.     metoprolol succinate 25 MG 24 hr tablet  Commonly known as: TOPROL-XL  Take 1 tablet (25 mg total) by mouth once daily.     mirtazapine 15 MG tablet  Commonly known as: REMERON  Take 1 tablet (15 mg total) by mouth every evening. For sleep            STOP taking these medications      betamethasone dipropionate 0.05 % cream     loratadine 10 mg tablet  Commonly known as: CLARITIN     methocarbamoL 750 MG Tab  Commonly known as: ROBAXIN     traZODone 50 MG tablet  Commonly known as: DESYREL              Indwelling Lines/Drains at time of discharge:   Lines/Drains/Airways       Drain  Duration             Female External Urinary Catheter w/ Suction 12/09/24 0645 2 days                    Time spent on the discharge of patient: 40 minutes         Garcia Soriano MD  Department of Hospital Medicine  [facility]

## 2024-12-11 NOTE — CONSULTS
Wound care consult completed by Anastasia Mandel and Dr. Bond. Wound care team to continue to follow up as needed throughout hospital stay.

## 2024-12-11 NOTE — PT/OT/SLP PROGRESS
Occupational Therapy   Treatment    Name: Iris Mueller  MRN: [id number]  Admitting Diagnosis:  AMS (altered mental status)       Recommendations:     Discharge Recommendations: Moderate Intensity Therapy  Discharge Equipment Recommendations:  to be determined by next level of care  Barriers to discharge:   (increased assistance with ADL, fall risk)    Assessment:     Iris Mueller is a 69 y.o. female with a medical diagnosis of AMS (altered mental status).  Performance deficits affecting function are weakness, impaired endurance, impaired self care skills, impaired functional mobility, gait instability, impaired balance, decreased safety awareness, impaired cardiopulmonary response to activity.     Rehab Prognosis:  Good; patient would benefit from acute skilled OT services to address these deficits and reach maximum level of function.       Plan:     Patient to be seen 5 x/week to address the above listed problems via self-care/home management, therapeutic activities, therapeutic exercises  Plan of Care Expires: 01/08/25  Plan of Care Reviewed with: patient    Subjective     Chief Complaint: I am feeling a little stronger.  Patient/Family Comments/goals: To return home  Pain/Comfort:  Pain Rating 1: 0/10    Objective:     Communicated with: nurse Hoskins prior to session.  Patient found HOB elevated with peripheral IV, PureWick, bed alarm, oxygen, telemetry upon OT entry to room.    General Precautions: Standard, fall    Orthopedic Precautions:N/A  Braces: N/A  Respiratory Status: Nasal cannula, flow 2 L/min     Occupational Performance:     Bed Mobility:    Patient completed Rolling/Turning to Left with  stand by assistance  Patient completed Supine to Sit with stand by assistance     Functional Mobility/Transfers:  Patient completed Sit <> Stand Transfer with contact guard assistance  with  rolling walker   Patient completed Bed <> Chair Transfer using Step Transfer technique with contact guard  assistance with rolling walker  Functional Mobility: She ambulated to the bathroom with the rolling walker and stood at the sink for g/h and then ambulated with the rw to the bedside chair. 15 feet in room functional mobility    Activities of Daily Living:  Grooming: supervision for g/h oral care, hair brushing, face washing and applying deodorant, standing at the sink   Toileting with CGA for toilet transfer, Supervision for hygiene.      Treatment & Education:  She was educated on Role of Occupational Therapy, goals and plan of care.  Discussed importance of OOB activity and functional mobility to negate the negative effects of prolonged bedrest during this hospitalization, safe transfers and d/c planning recommendations. Therapist provided facilitation and instruction of proper body mechanics and fall prevention strategies during tasks listed above.      Patient left up in chair with all lines intact, call button in reach, chair alarm on, and daughter present    GOALS:   Multidisciplinary Problems       Occupational Therapy Goals          Problem: Occupational Therapy    Goal Priority Disciplines Outcome Interventions   Occupational Therapy Goal     OT, PT/OT Progressing    Description: Goals to be met by: 1/8/2024     Patient will increase functional independence with ADLs by performing:    UE Dressing with Modified [place].  LE Dressing with Supervision and Assistive Devices as needed.  Grooming while standing at sink with Supervision.  Toileting from bedside commode with Supervision for hygiene and clothing management.   Supine to sit with Modified [place].  Step transfer with Supervision  Toilet transfer to toilet with Supervision.                         Time Tracking:     OT Date of Treatment: 12/11/24  OT Start Time: 0938  OT Stop Time: 1010  OT Total Time (min): 32 min    Billable Minutes:Self Care/Home Management 117  Therapeutic Activity 15    OT/EMI: OT          12/11/2024

## 2024-12-11 NOTE — PT/OT/SLP PROGRESS
Physical Therapy      Patient Name:  Iris Mueller   MRN:  [id number]    Patient not seen today secondary to Other (Comment) (Pt prepping for discharge.). Will follow-up next service date if discharge falls through.

## 2024-12-11 NOTE — ASSESSMENT & PLAN NOTE
Acute encephalopathy vs progressive dementia with delirium   MRI negative for acute stroke   Could be related to hypertensive encephalopathy however family states symptoms has been going on for about 2 months   normal B12, folate RPR and ammonia level   Improved on discharge   - Cont aspirin, Lipitor, Eliquis

## 2024-12-11 NOTE — ASSESSMENT & PLAN NOTE
Creatinine at baseline  Minimize nephrotoxic medications  Nephrology consulted per family request   Cr remained stable

## 2024-12-12 PROBLEM — N30.00 ACUTE CYSTITIS WITHOUT HEMATURIA: Status: ACTIVE | Noted: 2024-12-12

## 2024-12-12 LAB
ALBUMIN SERPL BCP-MCNC: 3.6 G/DL (ref 3.5–5.2)
ALP SERPL-CCNC: 147 U/L (ref 55–135)
ALT SERPL W/O P-5'-P-CCNC: 11 U/L (ref 10–44)
ANION GAP SERPL CALC-SCNC: 7 MMOL/L (ref 8–16)
AST SERPL-CCNC: 11 U/L (ref 10–40)
BASOPHILS # BLD AUTO: 0.03 K/UL (ref 0–0.2)
BASOPHILS NFR BLD: 0.4 % (ref 0–1.9)
BILIRUB SERPL-MCNC: 0.7 MG/DL (ref 0.1–1)
BUN SERPL-MCNC: 21 MG/DL (ref 8–23)
CALCIUM SERPL-MCNC: 8.5 MG/DL (ref 8.7–10.5)
CHLORIDE SERPL-SCNC: 102 MMOL/L (ref 95–110)
CO2 SERPL-SCNC: 26 MMOL/L (ref 23–29)
CREAT SERPL-MCNC: 1.8 MG/DL (ref 0.5–1.4)
DIFFERENTIAL METHOD BLD: ABNORMAL
EOSINOPHIL # BLD AUTO: 0.3 K/UL (ref 0–0.5)
EOSINOPHIL NFR BLD: 3.4 % (ref 0–8)
ERYTHROCYTE [DISTWIDTH] IN BLOOD BY AUTOMATED COUNT: 19.1 % (ref 11.5–14.5)
EST. GFR  (NO RACE VARIABLE): 29.8 ML/MIN/1.73 M^2
GLUCOSE SERPL-MCNC: 91 MG/DL (ref 70–110)
HCT VFR BLD AUTO: 30.8 % (ref 37–48.5)
HGB BLD-MCNC: 8.8 G/DL (ref 12–16)
IMM GRANULOCYTES # BLD AUTO: 0.04 K/UL (ref 0–0.04)
IMM GRANULOCYTES NFR BLD AUTO: 0.5 % (ref 0–0.5)
LYMPHOCYTES # BLD AUTO: 0.7 K/UL (ref 1–4.8)
LYMPHOCYTES NFR BLD: 9.7 % (ref 18–48)
MAGNESIUM SERPL-MCNC: 1.6 MG/DL (ref 1.6–2.6)
MCH RBC QN AUTO: 26 PG (ref 27–31)
MCHC RBC AUTO-ENTMCNC: 28.6 G/DL (ref 32–36)
MCV RBC AUTO: 91 FL (ref 82–98)
MONOCYTES # BLD AUTO: 0.6 K/UL (ref 0.3–1)
MONOCYTES NFR BLD: 8 % (ref 4–15)
NEUTROPHILS # BLD AUTO: 5.7 K/UL (ref 1.8–7.7)
NEUTROPHILS NFR BLD: 78 % (ref 38–73)
NRBC BLD-RTO: 0 /100 WBC
PLATELET # BLD AUTO: 262 K/UL (ref 150–450)
PMV BLD AUTO: 8.9 FL (ref 9.2–12.9)
POTASSIUM SERPL-SCNC: 4.1 MMOL/L (ref 3.5–5.1)
PROT SERPL-MCNC: 6.4 G/DL (ref 6–8.4)
RBC # BLD AUTO: 3.38 M/UL (ref 4–5.4)
SODIUM SERPL-SCNC: 135 MMOL/L (ref 136–145)
VIT B1 BLD-SCNC: 112.9 NMOL/L (ref 66.5–200)
WBC # BLD AUTO: 7.34 K/UL (ref 3.9–12.7)

## 2024-12-12 PROCEDURE — S4991 NICOTINE PATCH NONLEGEND: HCPCS | Performed by: INTERNAL MEDICINE

## 2024-12-12 PROCEDURE — 94761 N-INVAS EAR/PLS OXIMETRY MLT: CPT

## 2024-12-12 PROCEDURE — 25000242 PHARM REV CODE 250 ALT 637 W/ HCPCS: Performed by: INTERNAL MEDICINE

## 2024-12-12 PROCEDURE — 83735 ASSAY OF MAGNESIUM: CPT | Performed by: INTERNAL MEDICINE

## 2024-12-12 PROCEDURE — 94664 DEMO&/EVAL PT USE INHALER: CPT

## 2024-12-12 PROCEDURE — 36415 COLL VENOUS BLD VENIPUNCTURE: CPT | Performed by: INTERNAL MEDICINE

## 2024-12-12 PROCEDURE — 97116 GAIT TRAINING THERAPY: CPT

## 2024-12-12 PROCEDURE — 25000003 PHARM REV CODE 250: Performed by: INTERNAL MEDICINE

## 2024-12-12 PROCEDURE — 80053 COMPREHEN METABOLIC PANEL: CPT | Performed by: INTERNAL MEDICINE

## 2024-12-12 PROCEDURE — 99900031 HC PATIENT EDUCATION (STAT)

## 2024-12-12 PROCEDURE — 21400001 HC TELEMETRY ROOM

## 2024-12-12 PROCEDURE — 85025 COMPLETE CBC W/AUTO DIFF WBC: CPT | Performed by: INTERNAL MEDICINE

## 2024-12-12 PROCEDURE — 97535 SELF CARE MNGMENT TRAINING: CPT

## 2024-12-12 PROCEDURE — 99900035 HC TECH TIME PER 15 MIN (STAT)

## 2024-12-12 PROCEDURE — 94640 AIRWAY INHALATION TREATMENT: CPT

## 2024-12-12 PROCEDURE — 27000221 HC OXYGEN, UP TO 24 HOURS

## 2024-12-12 RX ORDER — IBUPROFEN 200 MG
1 TABLET ORAL DAILY
Qty: 14 PATCH | Refills: 0 | Status: SHIPPED | OUTPATIENT
Start: 2024-12-12 | End: 2024-12-26

## 2024-12-12 RX ORDER — HYDRALAZINE HYDROCHLORIDE 25 MG/1
25 TABLET, FILM COATED ORAL ONCE
Status: DISCONTINUED | OUTPATIENT
Start: 2024-12-12 | End: 2024-12-12

## 2024-12-12 RX ORDER — CEPHALEXIN 250 MG/1
250 CAPSULE ORAL EVERY 6 HOURS
Status: DISCONTINUED | OUTPATIENT
Start: 2024-12-12 | End: 2024-12-17 | Stop reason: HOSPADM

## 2024-12-12 RX ORDER — HYDRALAZINE HYDROCHLORIDE 25 MG/1
25 TABLET, FILM COATED ORAL EVERY 12 HOURS
Status: DISCONTINUED | OUTPATIENT
Start: 2024-12-12 | End: 2024-12-12

## 2024-12-12 RX ORDER — HYDRALAZINE HYDROCHLORIDE 25 MG/1
25 TABLET, FILM COATED ORAL EVERY 8 HOURS
Status: DISCONTINUED | OUTPATIENT
Start: 2024-12-12 | End: 2024-12-13

## 2024-12-12 RX ORDER — HYDRALAZINE HYDROCHLORIDE 25 MG/1
50 TABLET, FILM COATED ORAL EVERY 8 HOURS
Status: DISCONTINUED | OUTPATIENT
Start: 2024-12-12 | End: 2024-12-12

## 2024-12-12 RX ADMIN — HYDRALAZINE HYDROCHLORIDE 25 MG: 25 TABLET ORAL at 08:12

## 2024-12-12 RX ADMIN — DOXYCYCLINE HYCLATE 100 MG: 100 CAPSULE ORAL at 08:12

## 2024-12-12 RX ADMIN — ATORVASTATIN CALCIUM 40 MG: 40 TABLET, FILM COATED ORAL at 08:12

## 2024-12-12 RX ADMIN — IPRATROPIUM BROMIDE AND ALBUTEROL SULFATE 3 ML: .5; 3 SOLUTION RESPIRATORY (INHALATION) at 08:12

## 2024-12-12 RX ADMIN — IPRATROPIUM BROMIDE AND ALBUTEROL SULFATE 3 ML: .5; 3 SOLUTION RESPIRATORY (INHALATION) at 06:12

## 2024-12-12 RX ADMIN — APIXABAN 5 MG: 5 TABLET, FILM COATED ORAL at 08:12

## 2024-12-12 RX ADMIN — METOPROLOL SUCCINATE 25 MG: 25 TABLET, FILM COATED, EXTENDED RELEASE ORAL at 08:12

## 2024-12-12 RX ADMIN — MIRTAZAPINE 15 MG: 7.5 TABLET, FILM COATED ORAL at 08:12

## 2024-12-12 RX ADMIN — HYDRALAZINE HYDROCHLORIDE 25 MG: 25 TABLET ORAL at 01:12

## 2024-12-12 RX ADMIN — ASPIRIN 81 MG 81 MG: 81 TABLET ORAL at 08:12

## 2024-12-12 RX ADMIN — IPRATROPIUM BROMIDE AND ALBUTEROL SULFATE 3 ML: .5; 3 SOLUTION RESPIRATORY (INHALATION) at 03:12

## 2024-12-12 RX ADMIN — AMLODIPINE BESYLATE 10 MG: 5 TABLET ORAL at 08:12

## 2024-12-12 RX ADMIN — NICOTINE 1 PATCH: 14 PATCH, EXTENDED RELEASE TRANSDERMAL at 08:12

## 2024-12-12 RX ADMIN — HYDROCODONE BITARTRATE AND ACETAMINOPHEN 1 TABLET: 5; 325 TABLET ORAL at 05:12

## 2024-12-12 RX ADMIN — IPRATROPIUM BROMIDE AND ALBUTEROL SULFATE 3 ML: .5; 3 SOLUTION RESPIRATORY (INHALATION) at 12:12

## 2024-12-12 RX ADMIN — HYDRALAZINE HYDROCHLORIDE 25 MG: 25 TABLET ORAL at 05:12

## 2024-12-12 RX ADMIN — HYDROCODONE BITARTRATE AND ACETAMINOPHEN 1 TABLET: 5; 325 TABLET ORAL at 01:12

## 2024-12-12 RX ADMIN — CEPHALEXIN 250 MG: 250 CAPSULE ORAL at 05:12

## 2024-12-12 RX ADMIN — CEPHALEXIN 250 MG: 250 CAPSULE ORAL at 01:12

## 2024-12-12 RX ADMIN — AMIODARONE HYDROCHLORIDE 200 MG: 200 TABLET ORAL at 08:12

## 2024-12-12 NOTE — PROGRESS NOTES
INPATIENT NEPHROLOGY CONSULT   [facility]    Patient Name: Iris Mueller  Date: 12/12/2024    Reason for consultation: CKD IV    Chief Complaint:   Chief Complaint   Patient presents with    Headache     Fell twice today , slurred speech started at 1830       History of Present Illness:  Patient is a 69 year old female with history of HTN, presented with recurrent falls, and slurred speech. Patient was found to be in hypertensive emergency on admission. CT C spine and head was negative. MRI brain was negative for acute findings. Showed punctate focus of susceptibility artifact in the right cerebellum and left basal ganglia representing microhemorrhage or calcification. Neurology was consulted. Patient was started on aspirin and Lipitor for possible TIA. Her home Eliquis was resumed. Patient was also started on PO Doxycycline for right lower extremity cellulitis. Nephrology consulted per family request for CKD IV. Patient continue to have episodes of confusion. Dementia work up sent. Patient worked with PT, OT and recommended SNF.     Interval History:  12/9- work on BP control- then edema management  12/10  975 ml UOP recorded.  BP starting to improve--still high at times.  Na+ improved.  Renal function stable.  Sleepy, is able to converse some, though.  12/11  one shift  ml.  BP meds adjusted yesterday, BP still high.  renal function stable.  Hydralazine and lasix added today.  Pt up w/OT, feeling better.  12/12  One shift UOP recorded, 600 ml.  Still w/BP spikes, hydralazine adjusted.  Renal function stable.  Sleepy, awakens easily.      Plan of Care:    Assessment:  CKD IV  HTN emerg/TIA  Edema  Hyponatremia  SHPT  Anemia of CKD    Plan:    - renal function at baseline  - not uremic- mental status issues combo of underlying age related and vascular dementia chronically and HTN emerg causing TIA acutely  - agree with norvasc, started hydralazine 12/11, adjusted 12/12  - lasix  MWF  - 2g salt, 1.5L fluid per day  - no active BMM issues  - supplement iron- DENEEN when BP improved    Thank you for allowing us to participate in this patient's care. We will continue to follow.    Vital Signs:  Temp Readings from Last 3 Encounters:   24 98.1 °F (36.7 °C) (Oral)   24 97.8 °F (36.6 °C) (Oral)   24 97.6 °F (36.4 °C) (Oral)       Pulse Readings from Last 3 Encounters:   24 102   10/30/24 89   09/10/24 92       BP Readings from Last 3 Encounters:   24 (!) 179/105   10/30/24 130/80   09/10/24 132/82       Weight:  Wt Readings from Last 3 Encounters:   24 108.7 kg (239 lb 10.2 oz)   24 106 kg (233 lb 11 oz)   10/30/24 96.2 kg (212 lb)       INS/OUTS:  I/O last 3 completed shifts:  In: 960 [P.O.:960]  Out: 1350 [Urine:1350]  No intake/output data recorded.    Past Medical & Surgical History:  Past Medical History:   Diagnosis Date    Anemia, unspecified     Arthritis     Asthma     COPD (chronic obstructive pulmonary disease)     Encounter for blood transfusion     Hypertension     Obese body habitus     Wound infection 2019    Right knee       Past Surgical History:   Procedure Laterality Date    ANGIOGRAM, CORONARY, WITH LEFT HEART CATHETERIZATION N/A 2022    Procedure: Angiogram, Coronary, with Left Heart Cath;  Surgeon: Jorge Tran MD;  Location: [facility] CATH/EP LAB;  Service: Cardiology;  Laterality: N/A;     SECTION      ECHOCARDIOGRAM,TRANSESOPHAGEAL N/A 2023    Procedure: Transesophageal echo (MARIANO) intra-procedure log documentation;  Surgeon: Araceli Sue Diagnostic;  Location: [facility] EP LAB;  Service: Cardiology;  Laterality: N/A;    INCISION AND DRAINAGE OF HEMATOMA Left 2024    Procedure: INCISION AND DRAINAGE, HEMATOMA;  Surgeon: Bryson Huerta MD;  Location: [facility] OR;  Service: Orthopedics;  Laterality: Left;    JOINT REPLACEMENT      Knee    KNEE ARTHROPLASTY Right 2019    Procedure: ARTHROPLASTY, KNEE;   Surgeon: Delio Chung MD;  Location: [facility] OR;  Service: Orthopedics;  Laterality: Right;  anesthesia:  general and block    REPLACEMENT OF WOUND VACUUM-ASSISTED CLOSURE DEVICE Right 9/12/2019    Procedure: REPLACEMENT, WOUND VAC;  Surgeon: Delio Chung MD;  Location: [facility] OR;  Service: Orthopedics;  Laterality: Right;    TONSILLECTOMY      TREATMENT OF CARDIAC ARRHYTHMIA N/A 8/31/2023    Procedure: Cardioversion or Defibrillation;  Surgeon: PJ Betancourt MD;  Location: [facility] EP LAB;  Service: Cardiology;  Laterality: N/A;  AF, MARIANO, DCCV, MAC, EH, 3 Prep    VENTRICULOGRAM, LEFT  12/23/2022    Procedure: Ventriculogram, Left;  Surgeon: Jorge Tran MD;  Location: [facility] CATH/EP LAB;  Service: Cardiology;;       Past Social History:  Social History     Socioeconomic History    Marital status:    Tobacco Use    Smoking status: Every Day     Current packs/day: 0.25     Average packs/day: 0.5 packs/day for 49.9 years (23.7 ttl pk-yrs)     Types: Cigarettes     Start date: 1975     Passive exposure: Current    Smokeless tobacco: Never    Tobacco comments:     Pt states she is a 46 year smoker, smokes around 5-8 cigarettes per day. Interested in nicotine replacement while admitted. Information given on outpatient smoking cessation.   Substance and Sexual Activity    Alcohol use: Yes     Comment: RARELY    Drug use: Never    Sexual activity: Not Currently     Partners: Male     Social Drivers of Health     Financial Resource Strain: Patient Declined (12/8/2024)    Overall Financial Resource Strain (CARDIA)     Difficulty of Paying Living Expenses: Patient declined   Food Insecurity: Patient Declined (12/8/2024)    Hunger Vital Sign     Worried About Running Out of Food in the Last Year: Patient declined     Ran Out of Food in the Last Year: Patient declined   Transportation Needs: Patient Declined (12/8/2024)    TRANSPORTATION NEEDS     Transportation : Patient declined   Physical Activity: Patient  Declined (12/23/2022)    Exercise Vital Sign     Days of Exercise per Week: Patient declined     Minutes of Exercise per Session: Patient declined   Stress: Patient Declined (12/8/2024)    [employer] - Occupational Stress Questionnaire     Feeling of Stress : Patient declined   Housing Stability: Patient Declined (12/8/2024)    Housing Stability Vital Sign     Unable to Pay for Housing in the Last Year: Patient declined     Homeless in the Last Year: Patient declined       Medications:  Scheduled Meds:   albuterol-ipratropium  3 mL Nebulization Q4H    amiodarone  200 mg Oral Daily    amLODIPine  10 mg Oral Daily    apixaban  5 mg Oral BID    aspirin  81 mg Oral Daily    atorvastatin  40 mg Oral QHS    doxycycline  100 mg Oral Q12H    furosemide  20 mg Oral Every Mon, Wed, Fri    hydrALAZINE  25 mg Oral Q8H    metoprolol succinate  25 mg Oral Daily    mirtazapine  15 mg Oral QHS    nicotine  1 patch Transdermal Daily     Continuous Infusions:  PRN Meds:.  Current Facility-Administered Medications:     acetaminophen, 650 mg, Oral, Q6H PRN    dextromethorphan-guaiFENesin  mg/5 ml, 15 mL, Oral, Q6H PRN    gabapentin, 300 mg, Oral, Nightly PRN    hydrALAZINE, 10 mg, Intravenous, Q4H PRN    HYDROcodone-acetaminophen, 1 tablet, Oral, Q6H PRN    ondansetron, 4 mg, Intravenous, Q6H PRN  Current Facility-Administered Medications on File Prior to Encounter   Medication Dose Route Frequency Provider Last Rate Last Admin    lidocaine (PF) 10 mg/ml (1%) injection 5 mg  0.5 mL Intradermal Once Azeem Anderson MD         Current Outpatient Medications on File Prior to Encounter   Medication Sig Dispense Refill    amiodarone (PACERONE) 200 MG Tab Take 1 tablet (200 mg total) by mouth once daily. 30 tablet 11    apixaban (ELIQUIS) 5 mg Tab Take 5 mg by mouth 2 (two) times daily.      buPROPion (WELLBUTRIN SR) 150 MG TBSR 12 hr tablet Take 1 tablet (150 mg total) by mouth 2 (two) times daily.  "180 tablet 3    FLUoxetine 40 MG capsule Take 1 capsule (40 mg total) by mouth once daily. 90 capsule 3    metoprolol succinate (TOPROL-XL) 25 MG 24 hr tablet Take 1 tablet (25 mg total) by mouth once daily. 90 tablet 3    mirtazapine (REMERON) 15 MG tablet Take 1 tablet (15 mg total) by mouth every evening. For sleep 30 tablet 3    mupirocin (BACTROBAN) 2 % ointment Apply topically 2 (two) times daily. To infected skin tear 22 g 1    triamcinolone acetonide 0.1% (KENALOG) 0.1 % cream Apply topically 2 (two) times daily. 60 g 2       Allergies:  Patient has no known allergies.    Past Family History:  Reviewed; refer to Hospitalist Admission Note    Review of Systems:  Review of Systems - All 14 systems reviewed and negative, except as noted in HPI    Physical Exam:  BP (!) 179/105   Pulse 102   Temp 98.1 °F (36.7 °C) (Oral)   Resp 18   Ht 5' 3" (1.6 m)   Wt 108.7 kg (239 lb 10.2 oz)   SpO2 (!) 90%   BMI 42.45 kg/m²     General Appearance:    NAD, AAO x 3, cooperative, appears stated age   Head:    Normocephalic, atraumatic   Eyes:    PER, EOMI, and conjunctiva/sclera clear bilaterally        Mouth:   Moist mucus membranes, no thrush or oral lesions, normal      dentition   Back:     No CVA tenderness   Lungs:     Clear to auscultation bilaterally, no wheeze, crackles, rales      or rhonchi, symmetric air movement, respirations unlabored   Heart:    Regular rate and rhythm, S1 and S2 normal, no murmur, rub   or gallop   Abdomen:     Soft, non-tender, non-distended, bowel sounds active all four   quadrants, no RT or guarding, no masses, no organomegaly   Extremities:   Warm and well perfused, distal pulses intact, no cyanosis or    peripheral edema   MSK:   No joint or muscle swelling, tenderness or deformity   Skin:   Skin color, texture, turgor normal, no rashes or lesions   Neurologic/Psychiatric:   CNII-XII intact, normal strength and sensation       throughout, no asterixis; normal affect, memory, " judgement     and insight     Results:  Lab Results   Component Value Date     (L) 12/12/2024    K 4.1 12/12/2024     12/12/2024    CO2 26 12/12/2024    BUN 21 12/12/2024    CREATININE 1.8 (H) 12/12/2024    CALCIUM 8.5 (L) 12/12/2024    ANIONGAP 7 (L) 12/12/2024    ESTGFRAFRICA 52 (L) 04/25/2022    EGFRNONAA 45 (L) 04/25/2022       Lab Results   Component Value Date    CALCIUM 8.5 (L) 12/12/2024    PHOS 4.1 09/05/2019       Recent Labs   Lab 12/12/24  0438   WBC 7.34   RBC 3.38*   HGB 8.8*   HCT 30.8*      MCV 91   MCH 26.0*   MCHC 28.6*       I have personally reviewed pertinent radiological imaging and reports from this admission.    I have spent > 70 minutes providing care for this patient for the above diagnoses. These services have included chart/data/imaging review, evaluation, exam, formulation of plan, , note preparation, and discussions with staff involved in this patient's care.    Cristina Hahn NP    [facility]  [address] [place] [address]  FARIBA Pacheco [address]  [phone] (p)  [phone] (f)

## 2024-12-12 NOTE — CARE UPDATE
12/12/24 0656   Patient Assessment/Suction   Level of Consciousness (AVPU) responds to voice  (RESTING)   Respiratory Effort Normal;Unlabored   Expansion/Accessory Muscles/Retractions no use of accessory muscles;no retractions;expansion symmetric   All Lung Fields Breath Sounds Anterior:;wheezes, expiratory   Rhythm/Pattern, Respiratory unlabored   Cough Frequency no cough   PRE-TX-O2   Device (Oxygen Therapy) room air   SpO2 95 %   Pulse Oximetry Type Intermittent   $ Pulse Oximetry - Multiple Charge Pulse Oximetry - Multiple   Pulse 109   Resp 18   Aerosol Therapy   $ Aerosol Therapy Charges Aerosol Treatment   Daily Review of Necessity (SVN) completed   Respiratory Treatment Status (SVN) given   Treatment Route (SVN) mask;oxygen   Patient Position HOB elevated   Post Treatment Assessment (SVN) breath sounds unchanged   Signs of Intolerance (SVN) none   Breath Sounds Post-Respiratory Treatment   Throughout All Fields Post-Treatment All Fields   Throughout All Fields Post-Treatment no change   Post-treatment Heart Rate (beats/min) 102   Post-treatment Resp Rate (breaths/min) 16   Vibratory PEP Therapy   $ Vibratory PEP Charges   (REFUSED)   Daily Review of Necessity (PEP Therapy) other (see comments)  (RESTING)   Education   $ Education Bronchodilator;15 min

## 2024-12-12 NOTE — SUBJECTIVE & OBJECTIVE
Interval History: Patient alert and oriented. Confusion episodes are better per family. BP still elevated this morning.     Review of Systems  Objective:     Vital Signs (Most Recent):  Temp: 98.1 °F (36.7 °C) (12/12/24 0826)  Pulse: 102 (12/12/24 0826)  Resp: 18 (12/12/24 0826)  BP: (!) 179/105 (12/12/24 0826)  SpO2: (!) 90 % (12/12/24 0826) Vital Signs (24h Range):  Temp:  [97.6 °F (36.4 °C)-98.3 °F (36.8 °C)] 98.1 °F (36.7 °C)  Pulse:  [] 102  Resp:  [14-20] 18  SpO2:  [90 %-99 %] 90 %  BP: (132-181)/() 179/105     Weight: 108.7 kg (239 lb 10.2 oz)  Body mass index is 42.45 kg/m².    Intake/Output Summary (Last 24 hours) at 12/12/2024 1102  Last data filed at 12/12/2024 0430  Gross per 24 hour   Intake 480 ml   Output 600 ml   Net -120 ml         Physical Exam  Constitutional:       General: She is not in acute distress.     Appearance: She is obese. She is not ill-appearing, toxic-appearing or diaphoretic.   HENT:      Head: Normocephalic and atraumatic.   Eyes:      General: No scleral icterus.        Right eye: No discharge.         Left eye: No discharge.   Neck:      Comments: No retractions; no nuchal rigidity  Cardiovascular:      Rate and Rhythm: Regular rhythm.      Heart sounds: Murmur (3/6 systolic murmur) heard.      No friction rub. No gallop.   Pulmonary:      Effort: Pulmonary effort is normal.      Breath sounds: Normal breath sounds.   Abdominal:      General: Bowel sounds are normal. There is no distension.      Palpations: Abdomen is soft. There is no mass.      Tenderness: There is no abdominal tenderness.   Musculoskeletal:      Right lower leg: Edema present improved.      Left lower leg: Edema present improved.   Skin:     General: Skin is warm and dry. Redness on both legs improved   Neurological:      Comments: no focal signs. Oriented to place and person and time.     Significant Labs: All pertinent labs within the past 24 hours have been reviewed.    Significant Imaging: I  have reviewed all pertinent imaging results/findings within the past 24 hours.

## 2024-12-12 NOTE — ASSESSMENT & PLAN NOTE
With slurred speech and confusion     Home meds for hypertension were reviewed and noted below.   Hypertension Medications               amLODIPine (NORVASC) 2.5 MG tablet Take 1 tablet (2.5 mg total) by mouth every evening.    metoprolol succinate (TOPROL-XL) 25 MG 24 hr tablet Take 1 tablet (25 mg total) by mouth once daily.          Norvasc increased to 10 mg  Cont metoprolol   PO hydralazine and Lasix added

## 2024-12-12 NOTE — PT/OT/SLP PROGRESS
Occupational Therapy   Treatment    Name: Iris Mueller  MRN: [id number]  Admitting Diagnosis:  AMS (altered mental status)       Recommendations:     Discharge Recommendations: Moderate Intensity Therapy  Discharge Equipment Recommendations:  to be determined by next level of care  Barriers to discharge:  Decreased caregiver support    Assessment:     Iris Mueller is a 69 y.o. female with a medical diagnosis of AMS (altered mental status).  Performance deficits affecting function are weakness, impaired endurance, impaired self care skills, impaired functional mobility, gait instability, impaired balance, decreased safety awareness, decreased lower extremity function, impaired cardiopulmonary response to activity.     Rehab Prognosis:  Good; patient would benefit from acute skilled OT services to address these deficits and reach maximum level of function.       Plan:     Patient to be seen 5 x/week to address the above listed problems via self-care/home management, therapeutic activities, therapeutic exercises  Plan of Care Expires: 01/08/25  Plan of Care Reviewed with: patient, daughter    Subjective     Chief Complaint: none  Patient/Family Comments/goals: none  Pain/Comfort:  Pain Rating 1: 0/10  Pain Rating Post-Intervention 1: 0/10    Objective:     Communicated with: nurse Hoskins prior to session.  Patient found up in chair with peripheral IV, PureWick, telemetry upon OT entry to room.    General Precautions: Standard, fall    Orthopedic Precautions:N/A  Braces: N/A  Respiratory Status: Room air     Occupational Performance:     Functional Mobility/Transfers:  Patient completed Sit <> Stand Transfer with stand by assistance  with  rolling walker   Patient completed Toilet Transfer Stand Pivot technique with stand by assistance with  rolling walker    Activities of Daily Living:  Grooming: stand by assistance with oral and facial hygiene while standing at sink      [facility] 6 Click ADL:       Treatment & Education:  OT ed patient on safety with walker use for functional mobility with cues for hand placement & sequencing.       Patient left up in chair with all lines intact, call button in reach, and daughter present    GOALS:   Multidisciplinary Problems       Occupational Therapy Goals          Problem: Occupational Therapy    Goal Priority Disciplines Outcome Interventions   Occupational Therapy Goal     OT, PT/OT Progressing    Description: Goals to be met by: 1/8/2024     Patient will increase functional independence with ADLs by performing:    UE Dressing with Modified [place].  LE Dressing with Supervision and Assistive Devices as needed.  Grooming while standing at sink with Supervision.  Toileting from bedside commode with Supervision for hygiene and clothing management.   Supine to sit with Modified [place].  Step transfer with Supervision  Toilet transfer to toilet with Supervision.                         Time Tracking:     OT Date of Treatment: 12/12/24  OT Start Time: 1029  OT Stop Time: 1047  OT Total Time (min): 18 min    Billable Minutes:Self Care/Home Management 18    OT/EMI: OT          12/12/2024

## 2024-12-12 NOTE — SUBJECTIVE & OBJECTIVE
Interval History: Patient is sitting up in the chair, no episodes of confusion. BP better controlled through out the day although high this morning prior to her meds. Patient was supposed to go to [facility] but they are informing they dont have any more beds and can't take her today.     Review of Systems  Objective:     Vital Signs (Most Recent):  Temp: 98.1 °F (36.7 °C) (12/12/24 0826)  Pulse: 102 (12/12/24 0826)  Resp: 18 (12/12/24 0826)  BP: (!) 179/105 (12/12/24 0826)  SpO2: (!) 90 % (12/12/24 0826) Vital Signs (24h Range):  Temp:  [97.6 °F (36.4 °C)-98.3 °F (36.8 °C)] 98.1 °F (36.7 °C)  Pulse:  [] 102  Resp:  [14-20] 18  SpO2:  [90 %-99 %] 90 %  BP: (132-181)/() 179/105     Weight: 108.7 kg (239 lb 10.2 oz)  Body mass index is 42.45 kg/m².    Intake/Output Summary (Last 24 hours) at 12/12/2024 1104  Last data filed at 12/12/2024 0430  Gross per 24 hour   Intake 480 ml   Output 600 ml   Net -120 ml         Physical Exam  Constitutional:       General: She is not in acute distress.     Appearance: She is obese. She is not ill-appearing, toxic-appearing or diaphoretic.   HENT:      Head: Normocephalic and atraumatic.   Eyes:      General: No scleral icterus.        Right eye: No discharge.         Left eye: No discharge.   Neck:      Comments: No retractions; no nuchal rigidity  Cardiovascular:      Rate and Rhythm: Regular rhythm.      Heart sounds: Murmur (3/6 systolic murmur) heard.      No friction rub. No gallop.   Pulmonary:      Effort: Pulmonary effort is normal.      Breath sounds: Normal breath sounds.   Abdominal:      General: Bowel sounds are normal. There is no distension.      Palpations: Abdomen is soft. There is no mass.      Tenderness: There is no abdominal tenderness.   Musculoskeletal:      Right lower leg: Edema present improved.      Left lower leg: Edema present improved.   Skin:     General: Skin is warm and dry. Redness on both legs improved   Neurological:      Comments: no  focal signs. Oriented to place and person and time.        Significant Labs: All pertinent labs within the past 24 hours have been reviewed.  CBC:   Recent Labs   Lab 12/11/24  0434 12/12/24 0438   WBC 7.27 7.34   HGB 8.9* 8.8*   HCT 31.0* 30.8*    262     CMP:   Recent Labs   Lab 12/11/24  0434 12/12/24 0438    135*   K 4.0 4.1    102   CO2 25 26   GLU 87 91   BUN 20 21   CREATININE 1.8* 1.8*   CALCIUM 8.7 8.5*   PROT 6.3 6.4   ALBUMIN 3.6 3.6   BILITOT 0.8 0.7   ALKPHOS 141* 147*   AST 12 11   ALT 10 11   ANIONGAP 8 7*       Significant Imaging: I have reviewed all pertinent imaging results/findings within the past 24 hours.

## 2024-12-12 NOTE — PLAN OF CARE
Problem: Adult Inpatient Plan of Care  Goal: Plan of Care Review  Outcome: Progressing     Problem: Fall Injury Risk  Goal: Absence of Fall and Fall-Related Injury  Outcome: Progressing     Problem: Bariatric Environmental Safety  Goal: Safety Maintained with Care  Outcome: Progressing

## 2024-12-12 NOTE — CARE UPDATE
12/11/24 1948   Patient Assessment/Suction   Level of Consciousness (AVPU) alert   Respiratory Effort Normal;Unlabored   Expansion/Accessory Muscles/Retractions no use of accessory muscles   All Lung Fields Breath Sounds coarse;diminished   Rhythm/Pattern, Respiratory unlabored;pattern regular   Cough Frequency infrequent   Cough Type congested   PRE-TX-O2   Device (Oxygen Therapy) nasal cannula with humidification   Flow (L/min) (Oxygen Therapy) 2   SpO2 (!) 91 %   Pulse Oximetry Type Intermittent   $ Pulse Oximetry - Multiple Charge Pulse Oximetry - Multiple   Pulse 98   Resp 18   Aerosol Therapy   $ Aerosol Therapy Charges Aerosol Treatment   Daily Review of Necessity (SVN) completed   Respiratory Treatment Status (SVN) given   Treatment Route (SVN) mask;oxygen   Patient Position HOB elevated   Post Treatment Assessment (SVN) breath sounds unchanged   Signs of Intolerance (SVN) none   Breath Sounds Post-Respiratory Treatment   Throughout All Fields Post-Treatment All Fields   Throughout All Fields Post-Treatment no change   Post-treatment Heart Rate (beats/min) 98   Post-treatment Resp Rate (breaths/min) 17   Education   $ Education Bronchodilator;15 min

## 2024-12-12 NOTE — PLAN OF CARE
Per Penny Bronson, they will be able to accept pt tomorrow due to bed availability. Updated RACHANA Rooney.      12/12/24 1046   Post-Acute Status   Post-Acute Authorization Placement   Post-Acute Placement Status Pending Bed Availability

## 2024-12-12 NOTE — PT/OT/SLP PROGRESS
Physical Therapy Treatment    Patient Name:  Iris Mueller   MRN:  [id number]    Recommendations:     Discharge Recommendations: Moderate Intensity Therapy  Discharge Equipment Recommendations: none  Barriers to discharge:  increased assist with mobility, decreased activity tolerance, balance deficits    Assessment:     Iris Mueller is a 69 y.o. female admitted with a medical diagnosis of AMS (altered mental status).  She presents with the following impairments/functional limitations: weakness, impaired endurance, impaired functional mobility, gait instability, impaired balance, impaired cognition, decreased lower extremity function, decreased safety awareness, impaired skin, impaired cardiopulmonary response to activity.    Pt up in chair, sleeping, withdaughter present. Pt easily roused and agreeable to visit.Ambulated 40 ft with RW CGA on RA. SPO2 88-89%, max  bpm , sustained. Required 2 minute seated rest for SPO2 to return to baseline of of low 90's and 5 mins  for HR to come down to 90's.Pt declined further activity.    Rehab Prognosis: Fair; patient would benefit from acute skilled PT services to address these deficits and reach maximum level of function.    Recent Surgery: * No surgery found *      Plan:     During this hospitalization, patient to be seen 6 x/week to address the identified rehab impairments via gait training, therapeutic activities, therapeutic exercises, neuromuscular re-education and progress toward the following goals:    Plan of Care Expires:  12/30/24    Subjective     Chief Complaint: Falls  Patient/Family Comments/goals: to get better  Pain/Comfort:  Pain Rating 1: 0/10  Pain Rating Post-Intervention 1: 0/10      Objective:     Communicated with nurse prior to session.  Patient found up in chair with peripheral IV, PureWick, telemetry upon PT entry to room.     General Precautions: Standard, fall  Orthopedic Precautions: N/A  Braces: N/A  Respiratory Status:  "Nasal cannula, flow 2 L/min, "I don't like it! Found with O2 not donned     Functional Mobility:  Transfers:     Sit to Stand:  contact guard assistance with rolling walker  Gait: x 40' with RW andCGA, knees threatening to buckle      AM-PAC 6 CLICK MOBILITY          Treatment & Education:  Pt educated on importance of time OOB, importance of intermittent mobility, safe techniques for transfers/ambulatin, discharge recommendations/options, and use of call light for assistance and fall prevention.      Patient left up in chair with all lines intact, call button in reach, and chair alarm on..    GOALS:   Multidisciplinary Problems       Physical Therapy Goals          Problem: Physical Therapy    Goal Priority Disciplines Outcome Interventions   Physical Therapy Goal     PT, PT/OT Progressing    Description: Goals to be met by: 2024     Patient will increase functional independence with mobility by performin. Supine to sit with Moderate Assistance  2. Sit to stand transfer with Moderate Assistance  3. Bed to chair transfer with Moderate Assistance using Rolling Walker  4. Gait  x 50 feet with Moderate Assistance using Rolling Walker.   5. Lower extremity exercise program x20 reps                        Time Tracking:     PT Received On: 24  PT Start Time: 1113     PT Stop Time: 1132  PT Total Time (min): 19 min     Billable Minutes: Gait Training 19    Treatment Type: Treatment  PT/PTA: PT     Number of PTA visits since last PT visit: 1     2024  "

## 2024-12-12 NOTE — PROGRESS NOTES
"[facility] Medicine  Progress Note    Patient Name: Iris Mueller  MRN: [id number]  Patient Class: IP- Inpatient   Admission Date: 12/7/2024  Length of Stay: 4 days  Attending Physician: Garcia Soriano MD  Primary Care Provider: Elkin You MD        Subjective     Principal Problem:AMS (altered mental status)        HPI:  The patient is a 69-year-old  female with a history of hypertension, CKD-4, atrial fibrillation (on Eliquis), morbid obesity, COPD, hyperlipidemia, CAD, aortic stenosis/regurgitation, stroke, and history of systolic/diastolic congestive heart failure.  Her last echo performed in 2022 showed, per Dr. Wright:  ·   "The estimated ejection fraction is 40%. There is a anterior apical segment that is hypo to akinetic with a wall motion abnormality  · Grade II left ventricular diastolic dysfunction. Mean left atrial pressure 20 mm Hg. Mild mitral annular calcification  · There are segmental left ventricular wall motion abnormalities.  · Normal right ventricular size with normal right ventricular systolic function.  · Moderate left atrial enlargement.  · Moderate aortic regurgitation.  · There is moderate aortic valve stenosis.  · Aortic valve area is 1.55 cm2; peak velocity is 2.88 m/s; mean gradient is 15 mmHg.  · Moderate mitral regurgitation.  · There is mild pulmonary hypertension.  · Mild tricuspid regurgitation.  · Elevated central venous pressure (15 mmHg).  · The estimated PA systolic pressure is 47 mmHg."    Please note the obtaining history from the patient is impossible, given her current mental state.      The patient lives home with her  who has visual and hearing impediment.  Thus, the majority of the history is obtained from the ER physician, records, and 2 adult children at bedside.    Her children at bedside chair concern that the patient takes her prescribed narcotics inappropriately.  The patient was brought in tonight " "because of recurrent falls.  Over the last 2-3 weeks, the patient had complained that she was "off balance" and had fallen 7-8 times.    Around the 630 p.m., family worse talking to the patient on the phone apparently and knows that she had slurred speech and that she was "stuck on the Ottoman inked".  Somehow, the patient reached the neighbor and by the time the children got to the house she was sitting in her recliner (has been moved from the dining room chair, presumably with the assistance of the neighbor).  Thus she was conducted to the ER.      While here she was afebrile but hypertensive at 224/109 and this came down to 146/108.  NIH was 1.    Workup showed that head CT for stroke was negative.  She had multiple imaging studies including bilateral hip x-rays with pelvis, chest x-ray, left humerus, left shoulder, and CT cervical spine without contrast, all of which were unrevealing.    MRI brain without contrast showed (with an addenda, per radiologist Dr. Narvaez):    "Impression:     No acute ischemic process. Moderate to advanced periventricular and deep white matter changes of chronic microvascular ischemia. GRE sequence demonstrates punctate focus of susceptibility artifact in the right cerebellum and left basal ganglia representing microhemorrhage or calcification."    Furthermore, the patient had an MRI brain without contrast which showed, per radiologist Dr. Burnett:    Impression:     "Examination is significantly limited secondary to motion with apparent absence of flow related enhancement within the left middle cerebral artery branches, left vertebral artery, and the left posterior cerebral artery.  This is most likely artifactual.     CTA may be attempted for further evaluation."    She was evaluated by vascular Neurology and my understanding is that she was not considered an intervention candidate.  Additionally, as her last known normal was not precisely known (as she is already fully " anticoagulated with Eliquis), she was not considered to be a tenecteplase candidate, either.  She is admitted for further diagnosis, treatment, and care.    Overview/Hospital Course:  Patient is a 69 year old female with history of HTN, presented with recurrent falls, and slurred speech. Patient was found to be in hypertensive emergency on admission. CT C spine and head was negative. MRI brain was negative for acute findings. Showed punctate focus of susceptibility artifact in the right cerebellum and left basal ganglia representing microhemorrhage or calcification. Neurology was consulted. Patient was started on aspirin and Lipitor for possible TIA. Her home Eliquis was resumed. Patient was also started on PO Doxycycline for right lower extremity cellulitis. Nephrology consulted per family request for CKD IV. Patient continue to have episodes of confusion. Dementia work up sent. Patient worked with PT, OT and recommended SNF.  Norvasc was increased. BP better controlled. Mental status improved. Patient discharged to [facility] for SNF. PO Doxy to continue for 10 days total. Patient was discharged but didn't leave as the facility did not take her due to BP being variable.     Interval History: Patient is sitting up in the chair, no episodes of confusion. BP better controlled through out the day although high this morning prior to her meds. Patient was supposed to go to [facility] but they are informing they dont have any more beds and can't take her today.     Review of Systems  Objective:     Vital Signs (Most Recent):  Temp: 98.1 °F (36.7 °C) (12/12/24 0826)  Pulse: 102 (12/12/24 0826)  Resp: 18 (12/12/24 0826)  BP: (!) 179/105 (12/12/24 0826)  SpO2: (!) 90 % (12/12/24 0826) Vital Signs (24h Range):  Temp:  [97.6 °F (36.4 °C)-98.3 °F (36.8 °C)] 98.1 °F (36.7 °C)  Pulse:  [] 102  Resp:  [14-20] 18  SpO2:  [90 %-99 %] 90 %  BP: (132-181)/() 179/105     Weight: 108.7 kg (239 lb 10.2 oz)  Body mass index  is 42.45 kg/m².    Intake/Output Summary (Last 24 hours) at 12/12/2024 1104  Last data filed at 12/12/2024 0430  Gross per 24 hour   Intake 480 ml   Output 600 ml   Net -120 ml         Physical Exam  Constitutional:       General: She is not in acute distress.     Appearance: She is obese. She is not ill-appearing, toxic-appearing or diaphoretic.   HENT:      Head: Normocephalic and atraumatic.   Eyes:      General: No scleral icterus.        Right eye: No discharge.         Left eye: No discharge.   Neck:      Comments: No retractions; no nuchal rigidity  Cardiovascular:      Rate and Rhythm: Regular rhythm.      Heart sounds: Murmur (3/6 systolic murmur) heard.      No friction rub. No gallop.   Pulmonary:      Effort: Pulmonary effort is normal.      Breath sounds: Normal breath sounds.   Abdominal:      General: Bowel sounds are normal. There is no distension.      Palpations: Abdomen is soft. There is no mass.      Tenderness: There is no abdominal tenderness.   Musculoskeletal:      Right lower leg: Edema present improved.      Left lower leg: Edema present improved.   Skin:     General: Skin is warm and dry. Redness on both legs improved   Neurological:      Comments: no focal signs. Oriented to place and person and time.        Significant Labs: All pertinent labs within the past 24 hours have been reviewed.  CBC:   Recent Labs   Lab 12/11/24 0434 12/12/24 0438   WBC 7.27 7.34   HGB 8.9* 8.8*   HCT 31.0* 30.8*    262     CMP:   Recent Labs   Lab 12/11/24 0434 12/12/24 0438    135*   K 4.0 4.1    102   CO2 25 26   GLU 87 91   BUN 20 21   CREATININE 1.8* 1.8*   CALCIUM 8.7 8.5*   PROT 6.3 6.4   ALBUMIN 3.6 3.6   BILITOT 0.8 0.7   ALKPHOS 141* 147*   AST 12 11   ALT 10 11   ANIONGAP 8 7*       Significant Imaging: I have reviewed all pertinent imaging results/findings within the past 24 hours.    Assessment and Plan     * AMS (altered mental status)  Acute encephalopathy vs progressive  dementia with delirium   MRI negative for acute stroke   Could be related to hypertensive encephalopathy however family states symptoms has been going on for about 2 months   normal B12, folate RPR and ammonia level   Improved on discharge   - Cont aspirin, Lipitor, Eliquis     Hypertensive emergency  With slurred speech and confusion     Home meds for hypertension were reviewed and noted below.   Hypertension Medications               amLODIPine (NORVASC) 2.5 MG tablet Take 1 tablet (2.5 mg total) by mouth every evening.    metoprolol succinate (TOPROL-XL) 25 MG 24 hr tablet Take 1 tablet (25 mg total) by mouth once daily.          Norvasc increased to 10 mg  Cont metoprolol   PO hydralazine and Lasix added     Acute cystitis without hematuria  PO Keflex per sensitivity        Cellulitis of right lower extremity  US DVT negative  PO Doxycycline D5, for total 10 days       Tobacco dependency  Nicotine patch    Chronic combined systolic and diastolic heart failure  As above    Pulmonary hypertension    As above    Nonrheumatic aortic valve stenosis with regurgitation  Echo shows moderate to severe stenosis     Persistent atrial fibrillation  Resume home Amiodarone  Resume metoprolol  Per records, patient has a CHADS-VASc score of 3  Resume Eliquis     Morbid obesity  Body mass index is 41.39 kg/m².   Complicates all aspects of care        CKD (chronic kidney disease) stage 4, GFR 15-29 ml/min  Creatinine at baseline  Minimize nephrotoxic medications  Nephrology consulted per family request   Cr remained stable       VTE Risk Mitigation (From admission, onward)           Ordered     apixaban tablet 5 mg  2 times daily         12/09/24 0748     IP VTE HIGH RISK PATIENT  Once         12/08/24 0234     Place sequential compression device  Until discontinued         12/08/24 0234                    Discharge Planning   VALERIANO: 12/13/2024     Code Status: Full Code   Medical Readiness for Discharge Date: 12/11/2024  Discharge  Plan A: Skilled Nursing Facility   Discharge Delays: None known at this time        Garcia Soriano MD  Department of Hospital Medicine   [facility]

## 2024-12-12 NOTE — PROGRESS NOTES
"[facility] Medicine  Progress Note    Patient Name: Iris Mueller  MRN: [id number]  Patient Class: IP- Inpatient   Admission Date: 12/7/2024  Length of Stay: 4 days  Attending Physician: Garcia Soriano MD  Primary Care Provider: Elkin You MD        Subjective     Principal Problem:AMS (altered mental status)        HPI:  The patient is a 69-year-old  female with a history of hypertension, CKD-4, atrial fibrillation (on Eliquis), morbid obesity, COPD, hyperlipidemia, CAD, aortic stenosis/regurgitation, stroke, and history of systolic/diastolic congestive heart failure.  Her last echo performed in 2022 showed, per Dr. Wright:  ·   "The estimated ejection fraction is 40%. There is a anterior apical segment that is hypo to akinetic with a wall motion abnormality  · Grade II left ventricular diastolic dysfunction. Mean left atrial pressure 20 mm Hg. Mild mitral annular calcification  · There are segmental left ventricular wall motion abnormalities.  · Normal right ventricular size with normal right ventricular systolic function.  · Moderate left atrial enlargement.  · Moderate aortic regurgitation.  · There is moderate aortic valve stenosis.  · Aortic valve area is 1.55 cm2; peak velocity is 2.88 m/s; mean gradient is 15 mmHg.  · Moderate mitral regurgitation.  · There is mild pulmonary hypertension.  · Mild tricuspid regurgitation.  · Elevated central venous pressure (15 mmHg).  · The estimated PA systolic pressure is 47 mmHg."    Please note the obtaining history from the patient is impossible, given her current mental state.      The patient lives home with her  who has visual and hearing impediment.  Thus, the majority of the history is obtained from the ER physician, records, and 2 adult children at bedside.    Her children at bedside chair concern that the patient takes her prescribed narcotics inappropriately.  The patient was brought in tonight " "because of recurrent falls.  Over the last 2-3 weeks, the patient had complained that she was "off balance" and had fallen 7-8 times.    Around the 630 p.m., family worse talking to the patient on the phone apparently and knows that she had slurred speech and that she was "stuck on the Ottoman inked".  Somehow, the patient reached the neighbor and by the time the children got to the house she was sitting in her recliner (has been moved from the dining room chair, presumably with the assistance of the neighbor).  Thus she was conducted to the ER.      While here she was afebrile but hypertensive at 224/109 and this came down to 146/108.  NIH was 1.    Workup showed that head CT for stroke was negative.  She had multiple imaging studies including bilateral hip x-rays with pelvis, chest x-ray, left humerus, left shoulder, and CT cervical spine without contrast, all of which were unrevealing.    MRI brain without contrast showed (with an addenda, per radiologist Dr. Narvaez):    "Impression:     No acute ischemic process. Moderate to advanced periventricular and deep white matter changes of chronic microvascular ischemia. GRE sequence demonstrates punctate focus of susceptibility artifact in the right cerebellum and left basal ganglia representing microhemorrhage or calcification."    Furthermore, the patient had an MRI brain without contrast which showed, per radiologist Dr. Burnett:    Impression:     "Examination is significantly limited secondary to motion with apparent absence of flow related enhancement within the left middle cerebral artery branches, left vertebral artery, and the left posterior cerebral artery.  This is most likely artifactual.     CTA may be attempted for further evaluation."    She was evaluated by vascular Neurology and my understanding is that she was not considered an intervention candidate.  Additionally, as her last known normal was not precisely known (as she is already fully " anticoagulated with Eliquis), she was not considered to be a tenecteplase candidate, either.  She is admitted for further diagnosis, treatment, and care.    Overview/Hospital Course:  Patient is a 69 year old female with history of HTN, presented with recurrent falls, and slurred speech. Patient was found to be in hypertensive emergency on admission. CT C spine and head was negative. MRI brain was negative for acute findings. Showed punctate focus of susceptibility artifact in the right cerebellum and left basal ganglia representing microhemorrhage or calcification. Neurology was consulted. Patient was started on aspirin and Lipitor for possible TIA. Her home Eliquis was resumed. Patient was also started on PO Doxycycline for right lower extremity cellulitis. Nephrology consulted per family request for CKD IV. Patient continue to have episodes of confusion. Dementia work up sent. Patient worked with PT, OT and recommended SNF.  Norvasc was increased. BP better controlled. Mental status improved. Patient discharged to [facility] for SNF. PO Doxy to continue for 10 days total. Patient was discharged but didn't leave as the facility did not take her due to BP being variable.     Interval History: Patient alert and oriented. Confusion episodes are better per family. BP still elevated this morning.     Review of Systems  Objective:     Vital Signs (Most Recent):  Temp: 98.1 °F (36.7 °C) (12/12/24 0826)  Pulse: 102 (12/12/24 0826)  Resp: 18 (12/12/24 0826)  BP: (!) 179/105 (12/12/24 0826)  SpO2: (!) 90 % (12/12/24 0826) Vital Signs (24h Range):  Temp:  [97.6 °F (36.4 °C)-98.3 °F (36.8 °C)] 98.1 °F (36.7 °C)  Pulse:  [] 102  Resp:  [14-20] 18  SpO2:  [90 %-99 %] 90 %  BP: (132-181)/() 179/105     Weight: 108.7 kg (239 lb 10.2 oz)  Body mass index is 42.45 kg/m².    Intake/Output Summary (Last 24 hours) at 12/12/2024 1102  Last data filed at 12/12/2024 0430  Gross per 24 hour   Intake 480 ml   Output 600 ml    Net -120 ml         Physical Exam  Constitutional:       General: She is not in acute distress.     Appearance: She is obese. She is not ill-appearing, toxic-appearing or diaphoretic.   HENT:      Head: Normocephalic and atraumatic.   Eyes:      General: No scleral icterus.        Right eye: No discharge.         Left eye: No discharge.   Neck:      Comments: No retractions; no nuchal rigidity  Cardiovascular:      Rate and Rhythm: Regular rhythm.      Heart sounds: Murmur (3/6 systolic murmur) heard.      No friction rub. No gallop.   Pulmonary:      Effort: Pulmonary effort is normal.      Breath sounds: Normal breath sounds.   Abdominal:      General: Bowel sounds are normal. There is no distension.      Palpations: Abdomen is soft. There is no mass.      Tenderness: There is no abdominal tenderness.   Musculoskeletal:      Right lower leg: Edema present improved.      Left lower leg: Edema present improved.   Skin:     General: Skin is warm and dry. Redness on both legs improved   Neurological:      Comments: no focal signs. Oriented to place and person and time.     Significant Labs: All pertinent labs within the past 24 hours have been reviewed.    Significant Imaging: I have reviewed all pertinent imaging results/findings within the past 24 hours.    Assessment and Plan     * AMS (altered mental status)  Acute encephalopathy vs progressive dementia with delirium   MRI negative for acute stroke   Could be related to hypertensive encephalopathy however family states symptoms has been going on for about 2 months   normal B12, folate RPR and ammonia level   Improved on discharge   - Cont aspirin, Lipitor, Eliquis     Hypertensive emergency  With slurred speech and confusion     Home meds for hypertension were reviewed and noted below.   Hypertension Medications               amLODIPine (NORVASC) 2.5 MG tablet Take 1 tablet (2.5 mg total) by mouth every evening.    metoprolol succinate (TOPROL-XL) 25 MG 24 hr  tablet Take 1 tablet (25 mg total) by mouth once daily.          Norvasc increased to 10 mg  Cont metoprolol   PO hydralazine and Lasix added     Acute cystitis without hematuria  PO Keflex per sensitivity        Cellulitis of right lower extremity  US DVT ordered  PO Doxycycline D4, 6 more days for total 10 days       Tobacco dependency  Nicotine patch    Chronic combined systolic and diastolic heart failure  As above    Pulmonary hypertension    As above    Nonrheumatic aortic valve stenosis with regurgitation  Echo shows moderate to severe stenosis     Persistent atrial fibrillation  Resume home Amiodarone  Resume metoprolol  Per records, patient has a CHADS-VASc score of 3  Resume Eliquis     Morbid obesity  Body mass index is 41.39 kg/m².   Complicates all aspects of care        CKD (chronic kidney disease) stage 4, GFR 15-29 ml/min  Creatinine at baseline  Minimize nephrotoxic medications  Nephrology consulted per family request   Cr remained stable       VTE Risk Mitigation (From admission, onward)           Ordered     apixaban tablet 5 mg  2 times daily         12/09/24 0748     IP VTE HIGH RISK PATIENT  Once         12/08/24 0234     Place sequential compression device  Until discontinued         12/08/24 0234                    Discharge Planning   VALERIANO: 12/13/2024     Code Status: Full Code   Medical Readiness for Discharge Date: 12/11/2024  Discharge Plan A: Skilled Nursing Facility   Discharge Delays: None known at this time            Please place Justification for DME        Garcia Soriano MD  Department of Hospital Medicine   [facility]

## 2024-12-13 LAB
ALBUMIN SERPL BCP-MCNC: 3.3 G/DL (ref 3.5–5.2)
ALP SERPL-CCNC: 132 U/L (ref 55–135)
ALT SERPL W/O P-5'-P-CCNC: 10 U/L (ref 10–44)
ANION GAP SERPL CALC-SCNC: 7 MMOL/L (ref 8–16)
AST SERPL-CCNC: 11 U/L (ref 10–40)
BASOPHILS # BLD AUTO: 0.03 K/UL (ref 0–0.2)
BASOPHILS NFR BLD: 0.5 % (ref 0–1.9)
BILIRUB SERPL-MCNC: 0.7 MG/DL (ref 0.1–1)
BUN SERPL-MCNC: 21 MG/DL (ref 8–23)
CALCIUM SERPL-MCNC: 8.3 MG/DL (ref 8.7–10.5)
CHLORIDE SERPL-SCNC: 103 MMOL/L (ref 95–110)
CO2 SERPL-SCNC: 26 MMOL/L (ref 23–29)
CREAT SERPL-MCNC: 1.7 MG/DL (ref 0.5–1.4)
DIFFERENTIAL METHOD BLD: ABNORMAL
EOSINOPHIL # BLD AUTO: 0.2 K/UL (ref 0–0.5)
EOSINOPHIL NFR BLD: 3.5 % (ref 0–8)
ERYTHROCYTE [DISTWIDTH] IN BLOOD BY AUTOMATED COUNT: 19 % (ref 11.5–14.5)
EST. GFR  (NO RACE VARIABLE): 31.9 ML/MIN/1.73 M^2
GLUCOSE SERPL-MCNC: 105 MG/DL (ref 70–110)
HCT VFR BLD AUTO: 28.1 % (ref 37–48.5)
HGB BLD-MCNC: 8.2 G/DL (ref 12–16)
IMM GRANULOCYTES # BLD AUTO: 0.03 K/UL (ref 0–0.04)
IMM GRANULOCYTES NFR BLD AUTO: 0.5 % (ref 0–0.5)
LYMPHOCYTES # BLD AUTO: 0.6 K/UL (ref 1–4.8)
LYMPHOCYTES NFR BLD: 9.7 % (ref 18–48)
MAGNESIUM SERPL-MCNC: 1.4 MG/DL (ref 1.6–2.6)
MCH RBC QN AUTO: 26.5 PG (ref 27–31)
MCHC RBC AUTO-ENTMCNC: 29.2 G/DL (ref 32–36)
MCV RBC AUTO: 91 FL (ref 82–98)
MONOCYTES # BLD AUTO: 0.5 K/UL (ref 0.3–1)
MONOCYTES NFR BLD: 8.1 % (ref 4–15)
NEUTROPHILS # BLD AUTO: 5.1 K/UL (ref 1.8–7.7)
NEUTROPHILS NFR BLD: 77.7 % (ref 38–73)
NRBC BLD-RTO: 0 /100 WBC
PLATELET # BLD AUTO: 236 K/UL (ref 150–450)
PMV BLD AUTO: 9 FL (ref 9.2–12.9)
POTASSIUM SERPL-SCNC: 3.8 MMOL/L (ref 3.5–5.1)
PROT SERPL-MCNC: 5.8 G/DL (ref 6–8.4)
RBC # BLD AUTO: 3.1 M/UL (ref 4–5.4)
SODIUM SERPL-SCNC: 136 MMOL/L (ref 136–145)
WBC # BLD AUTO: 6.52 K/UL (ref 3.9–12.7)

## 2024-12-13 PROCEDURE — 80053 COMPREHEN METABOLIC PANEL: CPT | Performed by: INTERNAL MEDICINE

## 2024-12-13 PROCEDURE — 83735 ASSAY OF MAGNESIUM: CPT | Performed by: INTERNAL MEDICINE

## 2024-12-13 PROCEDURE — 25000003 PHARM REV CODE 250: Performed by: INTERNAL MEDICINE

## 2024-12-13 PROCEDURE — 94640 AIRWAY INHALATION TREATMENT: CPT

## 2024-12-13 PROCEDURE — 21400001 HC TELEMETRY ROOM

## 2024-12-13 PROCEDURE — 85025 COMPLETE CBC W/AUTO DIFF WBC: CPT | Performed by: INTERNAL MEDICINE

## 2024-12-13 PROCEDURE — 94761 N-INVAS EAR/PLS OXIMETRY MLT: CPT

## 2024-12-13 PROCEDURE — 97530 THERAPEUTIC ACTIVITIES: CPT | Mod: CQ

## 2024-12-13 PROCEDURE — 63600175 PHARM REV CODE 636 W HCPCS: Mod: JZ,EC,JG | Performed by: INTERNAL MEDICINE

## 2024-12-13 PROCEDURE — 63600175 PHARM REV CODE 636 W HCPCS: Performed by: INTERNAL MEDICINE

## 2024-12-13 PROCEDURE — 25000242 PHARM REV CODE 250 ALT 637 W/ HCPCS: Performed by: INTERNAL MEDICINE

## 2024-12-13 PROCEDURE — 94664 DEMO&/EVAL PT USE INHALER: CPT

## 2024-12-13 PROCEDURE — S4991 NICOTINE PATCH NONLEGEND: HCPCS | Performed by: INTERNAL MEDICINE

## 2024-12-13 PROCEDURE — 99900035 HC TECH TIME PER 15 MIN (STAT)

## 2024-12-13 PROCEDURE — 36415 COLL VENOUS BLD VENIPUNCTURE: CPT | Performed by: INTERNAL MEDICINE

## 2024-12-13 PROCEDURE — 99900031 HC PATIENT EDUCATION (STAT)

## 2024-12-13 PROCEDURE — 27000221 HC OXYGEN, UP TO 24 HOURS

## 2024-12-13 PROCEDURE — 97535 SELF CARE MNGMENT TRAINING: CPT

## 2024-12-13 RX ORDER — MAGNESIUM SULFATE HEPTAHYDRATE 40 MG/ML
2 INJECTION, SOLUTION INTRAVENOUS ONCE
Status: COMPLETED | OUTPATIENT
Start: 2024-12-13 | End: 2024-12-13

## 2024-12-13 RX ORDER — HYDRALAZINE HYDROCHLORIDE 25 MG/1
50 TABLET, FILM COATED ORAL EVERY 8 HOURS
Status: DISCONTINUED | OUTPATIENT
Start: 2024-12-13 | End: 2024-12-17 | Stop reason: HOSPADM

## 2024-12-13 RX ORDER — METOPROLOL SUCCINATE 50 MG/1
50 TABLET, EXTENDED RELEASE ORAL DAILY
Qty: 30 TABLET | Refills: 0 | Status: SHIPPED | OUTPATIENT
Start: 2024-12-13 | End: 2024-12-16 | Stop reason: HOSPADM

## 2024-12-13 RX ORDER — METOPROLOL SUCCINATE 50 MG/1
50 TABLET, EXTENDED RELEASE ORAL DAILY
Status: DISCONTINUED | OUTPATIENT
Start: 2024-12-14 | End: 2024-12-15

## 2024-12-13 RX ORDER — LANOLIN ALCOHOL/MO/W.PET/CERES
400 CREAM (GRAM) TOPICAL DAILY
Status: DISCONTINUED | OUTPATIENT
Start: 2024-12-14 | End: 2024-12-17 | Stop reason: HOSPADM

## 2024-12-13 RX ORDER — LANOLIN ALCOHOL/MO/W.PET/CERES
400 CREAM (GRAM) TOPICAL DAILY
Status: DISCONTINUED | OUTPATIENT
Start: 2024-12-13 | End: 2024-12-13

## 2024-12-13 RX ORDER — LANOLIN ALCOHOL/MO/W.PET/CERES
1 CREAM (GRAM) TOPICAL DAILY
Status: DISCONTINUED | OUTPATIENT
Start: 2024-12-13 | End: 2024-12-17 | Stop reason: HOSPADM

## 2024-12-13 RX ADMIN — CEPHALEXIN 250 MG: 250 CAPSULE ORAL at 05:12

## 2024-12-13 RX ADMIN — HYDRALAZINE HYDROCHLORIDE 50 MG: 25 TABLET ORAL at 08:12

## 2024-12-13 RX ADMIN — DOXYCYCLINE HYCLATE 100 MG: 100 CAPSULE ORAL at 08:12

## 2024-12-13 RX ADMIN — FERROUS SULFATE TAB 325 MG (65 MG ELEMENTAL FE) 1 EACH: 325 (65 FE) TAB at 11:12

## 2024-12-13 RX ADMIN — IPRATROPIUM BROMIDE AND ALBUTEROL SULFATE 3 ML: .5; 3 SOLUTION RESPIRATORY (INHALATION) at 06:12

## 2024-12-13 RX ADMIN — HYDROCODONE BITARTRATE AND ACETAMINOPHEN 1 TABLET: 5; 325 TABLET ORAL at 08:12

## 2024-12-13 RX ADMIN — ATORVASTATIN CALCIUM 40 MG: 40 TABLET, FILM COATED ORAL at 08:12

## 2024-12-13 RX ADMIN — CEPHALEXIN 250 MG: 250 CAPSULE ORAL at 11:12

## 2024-12-13 RX ADMIN — METOPROLOL SUCCINATE 25 MG: 25 TABLET, FILM COATED, EXTENDED RELEASE ORAL at 08:12

## 2024-12-13 RX ADMIN — IPRATROPIUM BROMIDE AND ALBUTEROL SULFATE 3 ML: .5; 3 SOLUTION RESPIRATORY (INHALATION) at 12:12

## 2024-12-13 RX ADMIN — EPOETIN ALFA-EPBX 10000 UNITS: 10000 INJECTION, SOLUTION INTRAVENOUS; SUBCUTANEOUS at 11:12

## 2024-12-13 RX ADMIN — AMIODARONE HYDROCHLORIDE 200 MG: 200 TABLET ORAL at 08:12

## 2024-12-13 RX ADMIN — HYDROCODONE BITARTRATE AND ACETAMINOPHEN 1 TABLET: 5; 325 TABLET ORAL at 06:12

## 2024-12-13 RX ADMIN — IPRATROPIUM BROMIDE AND ALBUTEROL SULFATE 3 ML: .5; 3 SOLUTION RESPIRATORY (INHALATION) at 11:12

## 2024-12-13 RX ADMIN — ASPIRIN 81 MG 81 MG: 81 TABLET ORAL at 08:12

## 2024-12-13 RX ADMIN — APIXABAN 5 MG: 5 TABLET, FILM COATED ORAL at 08:12

## 2024-12-13 RX ADMIN — HYDRALAZINE HYDROCHLORIDE 25 MG: 25 TABLET ORAL at 05:12

## 2024-12-13 RX ADMIN — CEPHALEXIN 250 MG: 250 CAPSULE ORAL at 06:12

## 2024-12-13 RX ADMIN — FUROSEMIDE 20 MG: 20 TABLET ORAL at 08:12

## 2024-12-13 RX ADMIN — AMLODIPINE BESYLATE 10 MG: 5 TABLET ORAL at 08:12

## 2024-12-13 RX ADMIN — HYDRALAZINE HYDROCHLORIDE 25 MG: 25 TABLET ORAL at 04:12

## 2024-12-13 RX ADMIN — IPRATROPIUM BROMIDE AND ALBUTEROL SULFATE 3 ML: .5; 3 SOLUTION RESPIRATORY (INHALATION) at 03:12

## 2024-12-13 RX ADMIN — IPRATROPIUM BROMIDE AND ALBUTEROL SULFATE 3 ML: .5; 3 SOLUTION RESPIRATORY (INHALATION) at 04:12

## 2024-12-13 RX ADMIN — MIRTAZAPINE 15 MG: 7.5 TABLET, FILM COATED ORAL at 08:12

## 2024-12-13 RX ADMIN — NICOTINE 1 PATCH: 14 PATCH, EXTENDED RELEASE TRANSDERMAL at 08:12

## 2024-12-13 RX ADMIN — MAGNESIUM SULFATE HEPTAHYDRATE 2 G: 40 INJECTION, SOLUTION INTRAVENOUS at 08:12

## 2024-12-13 NOTE — PLAN OF CARE
1251 CM received a call from [facility] stating their provider are reviewing DC orders and she will call CM back.    1348 CM called [facility] to get an update, CM did not get an answer and left voice mail requesting a return phone call.    1420 CM received a call from [facility] stating they cannot accept the patient due to her elevated blood pressures. CM explained to Penny that the patient's blood pressures improved after receiving her BP meds. Penny stated they were still unable to accept the patient due to the BP being unstable and the patient having an elevated HR of 114.     CM updated the patient and her daughter of the above. CM asked patient if she wanted to pursue [facility] (2nd choice) or DC home with [facility]. Patient and daughter is talking it over and asked CM to follow back up.    1515  went to the bedside to speak with the patient. The patient's daughter, Barbara, was available on speaker phone. The patient confirmed that she did not want to discharge to [facility] or any other facility. CM also explained that [facility] is still not accepting.     The patient's daughter, Barbara, expressed concern in possibly appealing the discharge. The patient is alert and oriented x 4 and has decisional making capacity per Attending MD. The patient stated she does not want to appeal the discharge and is in agreement with discharging home with home health and would like to resume care with [facility]. JL spoke with Attending MD who confirmed the patient is medically safe and clear to DC home with [facility]. PT/OT also notified via secure chat that patient is discharging home with [facility], no additional DME needed for home.    CM sent updated referral and spoke with Sheila with [facility] who confirmed SOC date 12/16/2024. The patient will need a Home 02 eval prior to DC, order placed.       12/13/24 1344   Post-Acute Status   Post-Acute Authorization Placement   Post-Acute Placement  Status Pending payor review/awaiting authorization (if required)

## 2024-12-13 NOTE — PLAN OF CARE
Penny with [facility] is accepting pt today. KINGA notified Penny pt had a BM just a few min ago. Per Penny she is req d/c orders with weight bearing status and escripts. MD notified.      12/13/24 0854   Post-Acute Status   Post-Acute Authorization Placement   Post-Acute Placement Status Pending medical clearance/testing   Discharge Plan   Discharge Plan A Skilled Nursing [facility]     1021- KINGA sent d/c papers to [facility] via [employer] and notified Penny.

## 2024-12-13 NOTE — DISCHARGE SUMMARY
"[facility]  Discharge Summary      Patient Name: Iris Mueller  MRN: [id number]  BONIFACIO: [id number]  Patient Class: IP- Inpatient  Admission Date: 12/7/2024  Hospital Length of Stay: 5 days  Discharge Date and Time:  12/13/2024 11:03 AM  Attending Physician: Garcia Soriano MD   Discharging Provider: Garcia Soriano MD  Primary Care Provider: Elkin You MD    Primary Care Team: Networked reference to record PCT     HPI:   The patient is a 69-year-old  female with a history of hypertension, CKD-4, atrial fibrillation (on Eliquis), morbid obesity, COPD, hyperlipidemia, CAD, aortic stenosis/regurgitation, stroke, and history of systolic/diastolic congestive heart failure.  Her last echo performed in 2022 showed, per Dr. Wrihgt:  ·   "The estimated ejection fraction is 40%. There is a anterior apical segment that is hypo to akinetic with a wall motion abnormality  · Grade II left ventricular diastolic dysfunction. Mean left atrial pressure 20 mm Hg. Mild mitral annular calcification  · There are segmental left ventricular wall motion abnormalities.  · Normal right ventricular size with normal right ventricular systolic function.  · Moderate left atrial enlargement.  · Moderate aortic regurgitation.  · There is moderate aortic valve stenosis.  · Aortic valve area is 1.55 cm2; peak velocity is 2.88 m/s; mean gradient is 15 mmHg.  · Moderate mitral regurgitation.  · There is mild pulmonary hypertension.  · Mild tricuspid regurgitation.  · Elevated central venous pressure (15 mmHg).  · The estimated PA systolic pressure is 47 mmHg."    Please note the obtaining history from the patient is impossible, given her current mental state.      The patient lives home with her  who has visual and hearing impediment.  Thus, the majority of the history is obtained from the ER physician, records, and 2 adult children at bedside.    Her children at bedside chair " "concern that the patient takes her prescribed narcotics inappropriately.  The patient was brought in tonight because of recurrent falls.  Over the last 2-3 weeks, the patient had complained that she was "off balance" and had fallen 7-8 times.    Around the 630 p.m., family worse talking to the patient on the phone apparently and knows that she had slurred speech and that she was "stuck on the Ottoman inked".  Somehow, the patient reached the neighbor and by the time the children got to the house she was sitting in her recliner (has been moved from the dining room chair, presumably with the assistance of the neighbor).  Thus she was conducted to the ER.      While here she was afebrile but hypertensive at 224/109 and this came down to 146/108.  NIH was 1.    Workup showed that head CT for stroke was negative.  She had multiple imaging studies including bilateral hip x-rays with pelvis, chest x-ray, left humerus, left shoulder, and CT cervical spine without contrast, all of which were unrevealing.    MRI brain without contrast showed (with an addenda, per radiologist Dr. Narvaez):    "Impression:     No acute ischemic process. Moderate to advanced periventricular and deep white matter changes of chronic microvascular ischemia. GRE sequence demonstrates punctate focus of susceptibility artifact in the right cerebellum and left basal ganglia representing microhemorrhage or calcification."    Furthermore, the patient had an MRI brain without contrast which showed, per radiologist Dr. Burnett:    Impression:     "Examination is significantly limited secondary to motion with apparent absence of flow related enhancement within the left middle cerebral artery branches, left vertebral artery, and the left posterior cerebral artery.  This is most likely artifactual.     CTA may be attempted for further evaluation."    She was evaluated by vascular Neurology and my understanding is that she was not considered an intervention " candidate.  Additionally, as her last known normal was not precisely known (as she is already fully anticoagulated with Eliquis), she was not considered to be a tenecteplase candidate, either.  She is admitted for further diagnosis, treatment, and care.    * No surgery found *      Hospital Course:   Patient is a 69 year old female with history of HTN, presented with recurrent falls, and slurred speech. Patient was found to be in hypertensive emergency on admission. CT C spine and head was negative. MRI brain was negative for acute findings. Showed punctate focus of susceptibility artifact in the right cerebellum and left basal ganglia representing microhemorrhage or calcification. Neurology was consulted. Patient was started on aspirin and Lipitor for possible TIA. Her home Eliquis was resumed. Patient was also started on PO Doxycycline for right lower extremity cellulitis. Nephrology consulted per family request for CKD IV. Patient continue to have episodes of confusion. Dementia work up sent. Patient worked with PT, OT and recommended SNF.  Norvasc was increased. BP better controlled. Mental status improved. Patient discharged to [facility] for SNF. PO Doxy to continue for 10 days total. Patient was discharged but didn't leave as the facility did not take her due to BP being variable. Patient discharged to [facility]. BP medications and antibiotics as below.      Goals of Care Treatment Preferences:  Code Status: Full Code    Physical Exam  Constitutional:       General: She is not in acute distress.     Appearance: She is obese. She is not ill-appearing, toxic-appearing or diaphoretic.   HENT:      Head: Normocephalic and atraumatic.   Eyes:      General: No scleral icterus.        Right eye: No discharge.         Left eye: No discharge.   Neck:      Comments: No retractions; no nuchal rigidity  Cardiovascular:      Rate and Rhythm: Regular rhythm.      Heart sounds: Murmur (3/6 systolic murmur) heard.      No  friction rub. No gallop.   Pulmonary:      Effort: Pulmonary effort is normal.      Breath sounds: Normal breath sounds.   Abdominal:      General: Bowel sounds are normal. There is no distension.      Palpations: Abdomen is soft. There is no mass.      Tenderness: There is no abdominal tenderness.   Musculoskeletal:      Right lower leg: Edema present improved.      Left lower leg: Edema present improved.   Skin:     General: Skin is warm and dry. Redness on both legs improved   Neurological:      Comments: no focal signs. Oriented to place and person and time.     [facility] Screening:  The patient declined to be screened for utility difficulties, food insecurity, transport difficulties, housing insecurity, and interpersonal safety, so no concerns could be identified this admission.     Consults:   Consults (From admission, onward)          Status Ordering Provider     Inpatient consult to Nephrology  Once        Provider:  Brett Deng MD    Completed IRIS KOWALSKI     Inpatient consult to   Once        Provider:  (Not yet assigned)    Acknowledged NUSRAT GUTIERREZ     Inpatient consult to Neurology  Once        Provider:  Denton Noonan MD    Completed NUSRAT GUTIERREZ     Consult to Telemedicine - Acute Stroke  Once        Provider:  (Not yet assigned)    Acknowledged JUS WINSTON            * AMS (altered mental status)  Acute encephalopathy vs progressive dementia with delirium   MRI negative for acute stroke   Could be related to hypertensive encephalopathy however family states symptoms has been going on for about 2 months   normal B12, folate RPR and ammonia level   Improved on discharge   - Cont aspirin, Lipitor, Eliquis     Hypertensive emergency  With slurred speech and confusion     Home meds for hypertension were reviewed and noted below.   Hypertension Medications               amLODIPine (NORVASC) 2.5 MG tablet Take 1 tablet (2.5 mg total) by mouth every evening.     metoprolol succinate (TOPROL-XL) 25 MG 24 hr tablet Take 1 tablet (25 mg total) by mouth once daily.          Norvasc increased to 10 mg  Cont metoprolol   PO hydralazine and Lasix added     Acute cystitis without hematuria  PO Keflex per sensitivity for 7 days       Cellulitis of right lower extremity  US DVT negative  PO Doxycycline D5, for total 10 days       Tobacco dependency  Nicotine patch    Chronic combined systolic and diastolic heart failure  As above    Pulmonary hypertension    As above    Nonrheumatic aortic valve stenosis with regurgitation  Echo shows moderate to severe stenosis     Persistent atrial fibrillation  Resume home Amiodarone  Resume metoprolol increase to 50 mg   Per records, patient has a CHADS-VASc score of 3  Resume Eliquis     Morbid obesity  Body mass index is 43.31 kg/m².   Complicates all aspects of care        CKD (chronic kidney disease) stage 4, GFR 15-29 ml/min  Creatinine at baseline  Minimize nephrotoxic medications  Nephrology consulted per family request   Cr remained stable       Final Active Diagnoses:    Diagnosis Date Noted POA    PRINCIPAL PROBLEM:  AMS (altered mental status) [R41.82] 12/08/2024 Yes    Hypertensive emergency [I16.1] 12/09/2024 Yes    Acute cystitis without hematuria [N30.00] 12/12/2024 Yes    Cellulitis of right lower extremity [L03.115] 12/09/2024 Yes    Tobacco dependency [F17.200] 12/08/2024 Yes    Chronic combined systolic and diastolic heart failure [I50.42] 08/18/2023 Yes    Pulmonary hypertension [I27.20] 08/18/2023 Yes    Persistent atrial fibrillation [I48.19] 06/12/2023 Yes    Nonrheumatic aortic valve stenosis with regurgitation [I35.2] 06/12/2023 Yes    Morbid obesity [E66.01] 09/19/2019 Yes    CKD (chronic kidney disease) stage 4, GFR 15-29 ml/min [N18.4] 09/13/2019 Yes      Problems Resolved During this Admission:    Diagnosis Date Noted Date Resolved POA    CVA (cerebral vascular accident) [I63.9] 12/08/2024 12/09/2024 Yes     Nonischemic cardiomyopathy [I42.8] 06/12/2023 12/09/2024 Yes    Essential hypertension [I10] 08/14/2019 12/09/2024 Yes       Discharged Condition: good    Disposition: Skilled Nursing Facility    Follow Up:   Follow-up Information       Elkin You MD Follow up in 1 week(s).    Specialties: Family Medicine, Home Health Services, Hospice Services  Contact information:  [address] [facility]  [address]  [place] [address]  [phone]                           Patient Instructions:      Ambulatory referral/consult to Smoking Cessation Program   Standing Status: Future   Referral Priority: Routine Referral Type: Consultation   Referral Reason: Specialty Services Required   Requested Specialty: CTTS   Number of Visits Requested: 1       Significant Diagnostic Studies: Labs: CMP   Recent Labs   Lab 12/12/24 0438 12/13/24 0437   * 136   K 4.1 3.8    103   CO2 26 26   GLU 91 105   BUN 21 21   CREATININE 1.8* 1.7*   CALCIUM 8.5* 8.3*   PROT 6.4 5.8*   ALBUMIN 3.6 3.3*   BILITOT 0.7 0.7   ALKPHOS 147* 132   AST 11 11   ALT 11 10   ANIONGAP 7* 7*    and CBC   Recent Labs   Lab 12/12/24 0438 12/13/24 0437   WBC 7.34 6.52   HGB 8.8* 8.2*   HCT 30.8* 28.1*    236       Pending Diagnostic Studies:       None           Medications:  Reconciled Home Medications:      Medication List        START taking these medications      aspirin 81 MG Chew  Take 1 tablet (81 mg total) by mouth once daily.     cephALEXin 250 MG capsule  Commonly known as: KEFLEX  Take 1 capsule (250 mg total) by mouth 4 (four) times daily. for 7 days     doxycycline 100 MG Cap  Commonly known as: VIBRAMYCIN  Take 1 capsule (100 mg total) by mouth every 12 (twelve) hours. for 6 days     furosemide 20 MG tablet  Commonly known as: LASIX  Take 1 tablet (20 mg total) by mouth every Mon, Wed, Fri.     hydrALAZINE 25 MG tablet  Commonly known as: APRESOLINE  Take 1 tablet (25 mg total) by mouth every 12 (twelve) hours.     nicotine 21 mg/24  hr  Commonly known as: NICODERM CQ  Place 1 patch onto the skin once daily. for 14 days            CHANGE how you take these medications      amLODIPine 2.5 MG tablet  Commonly known as: NORVASC  Take 4 tablets (10 mg total) by mouth once daily.  What changed:   how much to take  when to take this     atorvastatin 40 MG tablet  Commonly known as: LIPITOR  Take 1 tablet (40 mg total) by mouth every evening.  What changed:   medication strength  how much to take     gabapentin 300 MG capsule  Commonly known as: NEURONTIN  Take 1 capsule (300 mg total) by mouth nightly as needed (pain).  What changed:   when to take this  reasons to take this     HYDROcodone-acetaminophen 5-325 mg per tablet  Commonly known as: NORCO  Take 1 tablet by mouth every 6 (six) hours as needed for Pain.  What changed: Another medication with the same name was removed. Continue taking this medication, and follow the directions you see here.     metoprolol succinate 50 MG 24 hr tablet  Commonly known as: TOPROL-XL  Take 1 tablet (50 mg total) by mouth once daily.  What changed:   medication strength  how much to take     mupirocin 2 % ointment  Commonly known as: BACTROBAN  Apply topically 2 (two) times daily. To infected skin tear  What changed: how much to take     triamcinolone acetonide 0.1% 0.1 % cream  Commonly known as: KENALOG  Apply topically 2 (two) times daily.  What changed: how much to take            CONTINUE taking these medications      amiodarone 200 MG Tab  Commonly known as: PACERONE  Take 1 tablet (200 mg total) by mouth once daily.     buPROPion 150 MG TBSR 12 hr tablet  Commonly known as: WELLBUTRIN SR  Take 1 tablet (150 mg total) by mouth 2 (two) times daily.     ELIQUIS 5 mg Tab  Generic drug: apixaban  Take 5 mg by mouth 2 (two) times daily.     FLUoxetine 40 MG capsule  Take 1 capsule (40 mg total) by mouth once daily.     mirtazapine 15 MG tablet  Commonly known as: REMERON  Take 1 tablet (15 mg total) by mouth every  evening. For sleep            STOP taking these medications      betamethasone dipropionate 0.05 % cream     loratadine 10 mg tablet  Commonly known as: CLARITIN     methocarbamoL 750 MG Tab  Commonly known as: ROBAXIN     traZODone 50 MG tablet  Commonly known as: DESYREL              Indwelling Lines/Drains at time of discharge:   Lines/Drains/Airways       Drain  Duration             Female External Urinary Catheter w/ Suction 12/09/24 0645 4 days                    Time spent on the discharge of patient: 40 minutes         Garcia Soriano MD  Department of Hospital Medicine  On license of [facility]

## 2024-12-13 NOTE — PLAN OF CARE
Problem: Adult Inpatient Plan of Care  Goal: Plan of Care Review  Outcome: Progressing  Goal: Patient-Specific Goal (Individualized)  Outcome: Progressing  Goal: Absence of Hospital-Acquired Illness or Injury  Outcome: Progressing  Goal: Optimal Comfort and Wellbeing  Outcome: Progressing  Goal: Readiness for Transition of Care  Outcome: Progressing     Problem: Fall Injury Risk  Goal: Absence of Fall and Fall-Related Injury  Outcome: Progressing     Problem: Infection  Goal: Absence of Infection Signs and Symptoms  Outcome: Progressing     Problem: Wound  Goal: Optimal Coping  Outcome: Progressing  Goal: Optimal Functional Ability  Outcome: Progressing  Goal: Absence of Infection Signs and Symptoms  Outcome: Progressing  Goal: Improved Oral Intake  Outcome: Progressing  Goal: Optimal Pain Control and Function  Outcome: Progressing  Goal: Skin Health and Integrity  Outcome: Progressing  Goal: Optimal Wound Healing  Outcome: Progressing

## 2024-12-13 NOTE — ASSESSMENT & PLAN NOTE
Resume home Amiodarone  Resume metoprolol increase to 50 mg   Per records, patient has a CHADS-VASc score of 3  Resume Eliquis

## 2024-12-13 NOTE — PT/OT/SLP PROGRESS
Occupational Therapy   Treatment    Name: Iris Mueller  MRN: [id number]  Admitting Diagnosis:  AMS (altered mental status)       Recommendations:     Discharge Recommendations: Moderate Intensity Therapy  Discharge Equipment Recommendations:  to be determined by next level of care  Barriers to discharge:  Decreased caregiver support    Assessment:     Iris Mueller is a 69 y.o. female with a medical diagnosis of AMS (altered mental status). Performance deficits affecting function are weakness, impaired endurance, impaired self care skills, impaired functional mobility, gait instability, impaired balance, decreased safety awareness, decreased lower extremity function, impaired cardiopulmonary response to activity.     Rehab Prognosis:  Good; patient would benefit from acute skilled OT services to address these deficits and reach maximum level of function.       Plan:     Patient to be seen 5 x/week to address the above listed problems via self-care/home management, therapeutic activities, therapeutic exercises  Plan of Care Expires: 01/08/25  Plan of Care Reviewed with: patient, daughter    Subjective     Chief Complaint: none  Patient/Family Comments/goals: none  Pain/Comfort:  Pain Rating 1: 0/10  Pain Rating Post-Intervention 1: 0/10    Objective:     Communicated with: nurse Hoskins prior to session.  Patient found up in chair with peripheral IV, telemetry upon OT entry to room.    General Precautions: Standard, fall    Orthopedic Precautions:N/A  Braces: N/A  Respiratory Status: Room air     Occupational Performance:     Functional Mobility/Transfers:  Patient completed Sit <> Stand Transfer with stand by assistance  with  rolling walker   Patient completed Toilet Transfer Stand Pivot technique with stand by assistance with  rolling walker    Activities of Daily Living:  Grooming: stand by assistance with oral and facial hygiene while standing at sink      [facility] 6 Click ADL:      Treatment &  Education:  OT ed patient on safety with walker use for functional mobility with cues for hand placement & sequencing.       Patient left up in chair with all lines intact, call button in reach, and daughter present    GOALS:   Multidisciplinary Problems       Occupational Therapy Goals          Problem: Occupational Therapy    Goal Priority Disciplines Outcome Interventions   Occupational Therapy Goal     OT, PT/OT Progressing    Description: Goals to be met by: 1/8/2024     Patient will increase functional independence with ADLs by performing:    UE Dressing with Modified [place].  LE Dressing with Supervision and Assistive Devices as needed.  Grooming while standing at sink with Supervision.  Toileting from bedside commode with Supervision for hygiene and clothing management.   Supine to sit with Modified [place].  Step transfer with Supervision  Toilet transfer to toilet with Supervision.                         Time Tracking:     OT Date of Treatment: 12/13/24  OT Start Time: 1051  OT Stop Time: 1107  OT Total Time (min): 16 min    Billable Minutes:Self Care/Home Management 16    OT/EMI: OT          12/13/2024

## 2024-12-13 NOTE — CONSULTS
Small scab on nose states she hit it on the cabinet, bruising bilateral arms chest     Complete skin assessment, bilateral buttock without redness, left arm small dry skin tear and dry scabs cleaned and dried and applied xeroform and a small bandage.  Left arm thick dry scab, previous skin tear.  Cleaned and dried and applied xeroform double layer and covered with gauze and wrapped with kerlix. Dry skin bilateral lower legs, says she has something at home she uses. Doesn't want aquaphor.  Follow up with Dr. Bond as needed

## 2024-12-13 NOTE — PT/OT/SLP PROGRESS
"Physical Therapy Treatment    Patient Name:  Iris Mueller   MRN:  [id number]    Recommendations:     Discharge Recommendations: Moderate Intensity Therapy  Discharge Equipment Recommendations: none  Barriers to discharge:  desat and tachy w/ mobility    Assessment:     Iris Mueller is a 69 y.o. female admitted with a medical diagnosis of AMS (altered mental status).  She presents with the following impairments/functional limitations: weakness, impaired endurance, impaired cardiopulmonary response to activity, decreased safety awareness.    Pt presents up in chair on RA, 93% SpO2 and 92 HR.    After ambulation on RA x 20,' pt with desat to 88% and HR elevated to 110s. Required 2-min seated rest for HR to return to 92, with sats remaining 88-92% on RA.    Further activity deferred due to arrival of lunch tray. Pt was left on 2L NC with tray set up.     Pt and daughter instructed to use supplemental O2 for activity.     Rehab Prognosis: Good and Fair; patient would benefit from acute skilled PT services to address these deficits and reach maximum level of function.    Recent Surgery: * No surgery found *      Plan:     During this hospitalization, patient to be seen 6 x/week to address the identified rehab impairments via gait training, therapeutic activities, therapeutic exercises, neuromuscular re-education and progress toward the following goals:    Plan of Care Expires:  12/30/24    Subjective     Chief Complaint: "I don't know if I need to wear oxygen"   Patient/Family Comments/goals: eat lunch   Pain/Comfort:  Pain Rating 1: 0/10  Pain Rating Post-Intervention 1: 0/10      Objective:     Communicated with nurse prior to session.  Patient found up in chair with peripheral IV, telemetry, chair check upon PT entry to room.     General Precautions: Standard, fall  Orthopedic Precautions: N/A  Braces: N/A  Respiratory Status:  found on RA; left on 2L      Functional Mobility:  Transfers:     Sit " to Stand:  contact guard assistance with rolling walker  Gait: 20' RW CGA on RA, with desat to 88% and remaining at 88-92% after 2-minute seated rest      AM-PAC 6 CLICK MOBILITY          Treatment & Education:  -Pt educ: benefits of participation with therapy, OOB to chair during the day and for all meals as tolerated, fall prevention, call light       Patient left up in chair with all lines intact, call button in reach, chair alarm on, nurse notified, and daughter present..    GOALS:   Multidisciplinary Problems       Physical Therapy Goals          Problem: Physical Therapy    Goal Priority Disciplines Outcome Interventions   Physical Therapy Goal     PT, PT/OT Progressing    Description: Goals to be met by: 2024     Patient will increase functional independence with mobility by performin. Supine to sit with Moderate Assistance  2. Sit to stand transfer with Moderate Assistance  3. Bed to chair transfer with Moderate Assistance using Rolling Walker  4. Gait  x 50 feet with Moderate Assistance using Rolling Walker.   5. Lower extremity exercise program x20 reps                        Time Tracking:     PT Received On: 24  PT Start Time: 1154     PT Stop Time: 1208  PT Total Time (min): 14 min     Billable Minutes: Therapeutic Activity 14    Treatment Type: Treatment  PT/PTA: PTA     Number of PTA visits since last PT visit: 1     2024

## 2024-12-13 NOTE — CARE UPDATE
12/13/24 1600   Home Oxygen Qualification   $ Home O2 Qualification Tech time 15 minutes   Room Air SpO2 At Rest 96 %   Room Air SpO2 During Ambulation 96 %

## 2024-12-13 NOTE — PROGRESS NOTES
INPATIENT NEPHROLOGY Progress Note  [facility]    Patient Name: Iris Mueller  Date: 12/13/2024    Reason for consultation: CKD IV    Chief Complaint:   Chief Complaint   Patient presents with    Headache     Fell twice today , slurred speech started at 1830       History of Present Illness:  Patient is a 69 year old female with history of HTN, presented with recurrent falls, and slurred speech. Patient was found to be in hypertensive emergency on admission. CT C spine and head was negative. MRI brain was negative for acute findings. Showed punctate focus of susceptibility artifact in the right cerebellum and left basal ganglia representing microhemorrhage or calcification. Neurology was consulted. Patient was started on aspirin and Lipitor for possible TIA. Her home Eliquis was resumed. Patient was also started on PO Doxycycline for right lower extremity cellulitis. Nephrology consulted per family request for CKD IV. Patient continue to have episodes of confusion. Dementia work up sent. Patient worked with PT, OT and recommended SNF.     Interval History:  12/9- work on BP control- then edema management  12/10  975 ml UOP recorded.  BP starting to improve--still high at times.  Na+ improved.  Renal function stable.  Sleepy, is able to converse some, though.  12/11  one shift  ml.  BP meds adjusted yesterday, BP still high.  renal function stable.  Hydralazine and lasix added today.  Pt up w/OT, feeling better.  12/12  One shift UOP recorded, 600 ml.  Still w/BP spikes, hydralazine adjusted.  Renal function stable.  Sleepy, awakens easily.   12/13 HR > 90, BP trends improved, on RA     Plan of Care:    Assessment:  CKD IV  HTN emerg/TIA  Edema  Tachycardia  Hyponatremia  SHPT  Anemia of CKD  HypoMg    Plan:    - renal function at baseline- improving over last few days  - not uremic- mental status issues combo of underlying age related and vascular dementia chronically and HTN  emerg causing TIA acutely  - agree with norvasc, started hydralazine , adjusted   - continue lasix MWF  - adjust metoprolol today  - 2g salt, 1.5L fluid per day  - no active BMM issues  - supplement iron- DENEEN 10K today  - IV Mg today- add MgOH 400mg daily    Thank you for allowing us to participate in this patient's care. We will continue to follow.    Vital Signs:  Temp Readings from Last 3 Encounters:   24 97.8 °F (36.6 °C) (Temporal)   24 97.8 °F (36.6 °C) (Oral)   24 97.6 °F (36.4 °C) (Oral)       Pulse Readings from Last 3 Encounters:   24 (!) 114   10/30/24 89   09/10/24 92       BP Readings from Last 3 Encounters:   24 (!) 154/95   10/30/24 130/80   09/10/24 132/82       Weight:  Wt Readings from Last 3 Encounters:   24 110.9 kg (244 lb 7.8 oz)   24 106 kg (233 lb 11 oz)   10/30/24 96.2 kg (212 lb)       INS/OUTS:  I/O last 3 completed shifts:  In: 480 [P.O.:480]  Out: 1100 [Urine:1100]  No intake/output data recorded.    Past Medical & Surgical History:  Past Medical History:   Diagnosis Date    Anemia, unspecified     Arthritis     Asthma     COPD (chronic obstructive pulmonary disease)     Encounter for blood transfusion     Hypertension     Obese body habitus     Wound infection 2019    Right knee       Past Surgical History:   Procedure Laterality Date    ANGIOGRAM, CORONARY, WITH LEFT HEART CATHETERIZATION N/A 2022    Procedure: Angiogram, Coronary, with Left Heart Cath;  Surgeon: Jorge Tran MD;  Location: [facility] CATH/EP LAB;  Service: Cardiology;  Laterality: N/A;     SECTION      ECHOCARDIOGRAM,TRANSESOPHAGEAL N/A 2023    Procedure: Transesophageal echo (MARIANO) intra-procedure log documentation;  Surgeon: Araceli Sue Diagnostic;  Location: [facility] EP LAB;  Service: Cardiology;  Laterality: N/A;    INCISION AND DRAINAGE OF HEMATOMA Left 2024    Procedure: INCISION AND DRAINAGE, HEMATOMA;  Surgeon: Bryson Huerta MD;   Location: [facility] OR;  Service: Orthopedics;  Laterality: Left;    JOINT REPLACEMENT      Knee    KNEE ARTHROPLASTY Right 8/14/2019    Procedure: ARTHROPLASTY, KNEE;  Surgeon: Delio Chung MD;  Location: [facility] OR;  Service: Orthopedics;  Laterality: Right;  anesthesia:  general and block    REPLACEMENT OF WOUND VACUUM-ASSISTED CLOSURE DEVICE Right 9/12/2019    Procedure: REPLACEMENT, WOUND VAC;  Surgeon: Delio Chung MD;  Location: [facility] OR;  Service: Orthopedics;  Laterality: Right;    TONSILLECTOMY      TREATMENT OF CARDIAC ARRHYTHMIA N/A 8/31/2023    Procedure: Cardioversion or Defibrillation;  Surgeon: PJ Betancourt MD;  Location: [facility] EP LAB;  Service: Cardiology;  Laterality: N/A;  AF, MARIANO, DCCV, MAC, EH, 3 Prep    VENTRICULOGRAM, LEFT  12/23/2022    Procedure: Ventriculogram, Left;  Surgeon: Jorge Tran MD;  Location: [facility] CATH/EP LAB;  Service: Cardiology;;       Past Social History:  Social History     Socioeconomic History    Marital status:    Tobacco Use    Smoking status: Every Day     Current packs/day: 0.25     Average packs/day: 0.5 packs/day for 49.9 years (23.7 ttl pk-yrs)     Types: Cigarettes     Start date: 1975     Passive exposure: Current    Smokeless tobacco: Never    Tobacco comments:     Pt states she is a 46 year smoker, smokes around 5-8 cigarettes per day. Interested in nicotine replacement while admitted. Information given on outpatient smoking cessation.   Substance and Sexual Activity    Alcohol use: Yes     Comment: RARELY    Drug use: Never    Sexual activity: Not Currently     Partners: Male     Social Drivers of Health     Financial Resource Strain: Patient Declined (12/8/2024)    Overall Financial Resource Strain (CARDIA)     Difficulty of Paying Living Expenses: Patient declined   Food Insecurity: Patient Declined (12/8/2024)    Hunger Vital Sign     Worried About Running Out of Food in the Last Year: Patient declined     Ran Out of Food in the Last Year:  Patient declined   Transportation Needs: Patient Declined (12/8/2024)    TRANSPORTATION NEEDS     Transportation : Patient declined   Physical Activity: Patient Declined (12/23/2022)    Exercise Vital Sign     Days of Exercise per Week: Patient declined     Minutes of Exercise per Session: Patient declined   Stress: Patient Declined (12/8/2024)    [employer] [place] [employer] - Occupational Stress Questionnaire     Feeling of Stress : Patient declined   Housing Stability: Patient Declined (12/8/2024)    Housing Stability Vital Sign     Unable to Pay for Housing in the Last Year: Patient declined     Homeless in the Last Year: Patient declined       Medications:  Scheduled Meds:   albuterol-ipratropium  3 mL Nebulization Q4H    amiodarone  200 mg Oral Daily    amLODIPine  10 mg Oral Daily    apixaban  5 mg Oral BID    aspirin  81 mg Oral Daily    atorvastatin  40 mg Oral QHS    cephALEXin  250 mg Oral Q6H    doxycycline  100 mg Oral Q12H    furosemide  20 mg Oral Every Mon, Wed, Fri    hydrALAZINE  25 mg Oral Q8H    magnesium sulfate IVPB  2 g Intravenous Once    metoprolol succinate  25 mg Oral Daily    mirtazapine  15 mg Oral QHS    nicotine  1 patch Transdermal Daily     Continuous Infusions:  PRN Meds:.  Current Facility-Administered Medications:     acetaminophen, 650 mg, Oral, Q6H PRN    dextromethorphan-guaiFENesin  mg/5 ml, 15 mL, Oral, Q6H PRN    gabapentin, 300 mg, Oral, Nightly PRN    hydrALAZINE, 10 mg, Intravenous, Q4H PRN    HYDROcodone-acetaminophen, 1 tablet, Oral, Q6H PRN    ondansetron, 4 mg, Intravenous, Q6H PRN  Current Facility-Administered Medications on File Prior to Encounter   Medication Dose Route Frequency Provider Last Rate Last Admin    lidocaine (PF) 10 mg/ml (1%) injection 5 mg  0.5 mL Intradermal Once Azeem Anderson MD         Current Outpatient Medications on File Prior to Encounter   Medication Sig Dispense Refill    amiodarone (PACERONE) 200 MG Tab Take 1  "tablet (200 mg total) by mouth once daily. 30 tablet 11    apixaban (ELIQUIS) 5 mg Tab Take 5 mg by mouth 2 (two) times daily.      buPROPion (WELLBUTRIN SR) 150 MG TBSR 12 hr tablet Take 1 tablet (150 mg total) by mouth 2 (two) times daily. 180 tablet 3    FLUoxetine 40 MG capsule Take 1 capsule (40 mg total) by mouth once daily. 90 capsule 3    metoprolol succinate (TOPROL-XL) 25 MG 24 hr tablet Take 1 tablet (25 mg total) by mouth once daily. 90 tablet 3    mirtazapine (REMERON) 15 MG tablet Take 1 tablet (15 mg total) by mouth every evening. For sleep 30 tablet 3    mupirocin (BACTROBAN) 2 % ointment Apply topically 2 (two) times daily. To infected skin tear 22 g 1    triamcinolone acetonide 0.1% (KENALOG) 0.1 % cream Apply topically 2 (two) times daily. 60 g 2       Allergies:  Patient has no known allergies.    Past Family History:  Reviewed; refer to Hospitalist Admission Note    Review of Systems:  Review of Systems - All 14 systems reviewed and negative, except as noted in HPI    Physical Exam:  BP (!) 154/95 (BP Location: Right arm, Patient Position: Sitting)   Pulse (!) 114   Temp 97.8 °F (36.6 °C) (Temporal)   Resp 18   Ht 5' 3" (1.6 m)   Wt 110.9 kg (244 lb 7.8 oz)   SpO2 (!) 94%   BMI 43.31 kg/m²     General Appearance:    NAD, AAO x 3, cooperative, appears stated age   Head:    Normocephalic, atraumatic   Eyes:    PER, EOMI, and conjunctiva/sclera clear bilaterally        Mouth:   Moist mucus membranes, no thrush or oral lesions, normal      dentition   Back:     No CVA tenderness   Lungs:     Clear to auscultation bilaterally, no wheeze, crackles, rales      or rhonchi, symmetric air movement, respirations unlabored   Heart:    Regular rate and rhythm, S1 and S2 normal, no murmur, rub   or gallop   Abdomen:     Soft, non-tender, non-distended, bowel sounds active all four   quadrants, no RT or guarding, no masses, no organomegaly   Extremities:   Warm and well perfused, distal pulses intact, " no cyanosis or    peripheral edema   MSK:   No joint or muscle swelling, tenderness or deformity   Skin:   Skin color, texture, turgor normal, no rashes or lesions   Neurologic/Psychiatric:   CNII-XII intact, normal strength and sensation       throughout, no asterixis; normal affect, memory, judgement     and insight     Results:  Lab Results   Component Value Date     12/13/2024    K 3.8 12/13/2024     12/13/2024    CO2 26 12/13/2024    BUN 21 12/13/2024    CREATININE 1.7 (H) 12/13/2024    CALCIUM 8.3 (L) 12/13/2024    ANIONGAP 7 (L) 12/13/2024    ESTGFRAFRICA 52 (L) 04/25/2022    EGFRNONAA 45 (L) 04/25/2022       Lab Results   Component Value Date    CALCIUM 8.3 (L) 12/13/2024    PHOS 4.1 09/05/2019       Recent Labs   Lab 12/13/24  0437   WBC 6.52   RBC 3.10*   HGB 8.2*   HCT 28.1*      MCV 91   MCH 26.5*   MCHC 29.2*       I have personally reviewed pertinent radiological imaging and reports from this admission.    I have spent > 70 minutes providing care for this patient for the above diagnoses. These services have included chart/data/imaging review, evaluation, exam, formulation of plan, , note preparation, and discussions with staff involved in this patient's care.    Brett Deng MD    [facility] [address] [address]  [phone] (p)  [phone] (f)

## 2024-12-13 NOTE — CARE UPDATE
12/12/24 2002   Patient Assessment/Suction   Level of Consciousness (AVPU) alert   Respiratory Effort Normal   Expansion/Accessory Muscles/Retractions no use of accessory muscles   All Lung Fields Breath Sounds diminished   Rhythm/Pattern, Respiratory unlabored   Cough Frequency no cough   Skin Integrity   $ Wound Care Tech Time 15 min   Area Observed Left;Right;Behind ear;Cheek;Nares   Skin Appearance without discoloration   PRE-TX-O2   Device (Oxygen Therapy) nasal cannula with humidification   Flow (L/min) (Oxygen Therapy) 2   SpO2 95 %   Pulse Oximetry Type Intermittent   $ Pulse Oximetry - Multiple Charge Pulse Oximetry - Multiple   Pulse 77   Resp 19   Aerosol Therapy   $ Aerosol Therapy Charges Aerosol Treatment   Daily Review of Necessity (SVN) completed   Respiratory Treatment Status (SVN) given   Treatment Route (SVN) mask;oxygen   Patient Position sitting in chair   Post Treatment Assessment (SVN) breath sounds unchanged;vital signs unchanged   Signs of Intolerance (SVN) none   Vibratory PEP Therapy   $ Vibratory PEP Charges Aerobika Therapy   $ Vibratory PEP Equipment Aerobika Equipment   $ Vibratory PEP Tech Time Charges 15 min   Daily Review of Necessity (PEP Therapy) completed   Type (PEP Therapy) vibratory/oscillatory   Device (PEP Therapy) flutter   Route (PEP Therapy) mouthpiece   Breaths per Cycle (PEP Therapy) 10   Cycles (PEP Therapy) 1   Settings (PEP Therapy) PEP 5   Patient Position (PEP Therapy) sitting in chair   Post Treatment Assessment (PEP) breath sounds unchanged;vital signs unchanged   Signs of Intolerance (PEP Therapy) none   Education   $ Education Bronchodilator;15 min   Respiratory Evaluation   $ Care Plan Tech Time 15 min

## 2024-12-14 LAB
ALBUMIN SERPL BCP-MCNC: 3.4 G/DL (ref 3.5–5.2)
ALP SERPL-CCNC: 138 U/L (ref 55–135)
ALT SERPL W/O P-5'-P-CCNC: 11 U/L (ref 10–44)
ANION GAP SERPL CALC-SCNC: 8 MMOL/L (ref 8–16)
AST SERPL-CCNC: 14 U/L (ref 10–40)
BASOPHILS # BLD AUTO: 0.03 K/UL (ref 0–0.2)
BASOPHILS NFR BLD: 0.4 % (ref 0–1.9)
BILIRUB SERPL-MCNC: 0.7 MG/DL (ref 0.1–1)
BUN SERPL-MCNC: 20 MG/DL (ref 8–23)
CALCIUM SERPL-MCNC: 8.5 MG/DL (ref 8.7–10.5)
CHLORIDE SERPL-SCNC: 102 MMOL/L (ref 95–110)
CO2 SERPL-SCNC: 27 MMOL/L (ref 23–29)
CREAT SERPL-MCNC: 1.7 MG/DL (ref 0.5–1.4)
DIFFERENTIAL METHOD BLD: ABNORMAL
EOSINOPHIL # BLD AUTO: 0.3 K/UL (ref 0–0.5)
EOSINOPHIL NFR BLD: 3.9 % (ref 0–8)
ERYTHROCYTE [DISTWIDTH] IN BLOOD BY AUTOMATED COUNT: 19 % (ref 11.5–14.5)
EST. GFR  (NO RACE VARIABLE): 31.9 ML/MIN/1.73 M^2
GLUCOSE SERPL-MCNC: 94 MG/DL (ref 70–110)
HCT VFR BLD AUTO: 28.6 % (ref 37–48.5)
HGB BLD-MCNC: 8.3 G/DL (ref 12–16)
IMM GRANULOCYTES # BLD AUTO: 0.05 K/UL (ref 0–0.04)
IMM GRANULOCYTES NFR BLD AUTO: 0.7 % (ref 0–0.5)
LYMPHOCYTES # BLD AUTO: 0.7 K/UL (ref 1–4.8)
LYMPHOCYTES NFR BLD: 9.9 % (ref 18–48)
MAGNESIUM SERPL-MCNC: 1.8 MG/DL (ref 1.6–2.6)
MCH RBC QN AUTO: 26.1 PG (ref 27–31)
MCHC RBC AUTO-ENTMCNC: 29 G/DL (ref 32–36)
MCV RBC AUTO: 90 FL (ref 82–98)
MONOCYTES # BLD AUTO: 0.5 K/UL (ref 0.3–1)
MONOCYTES NFR BLD: 7.1 % (ref 4–15)
NEUTROPHILS # BLD AUTO: 5.4 K/UL (ref 1.8–7.7)
NEUTROPHILS NFR BLD: 78 % (ref 38–73)
NRBC BLD-RTO: 0 /100 WBC
PLATELET # BLD AUTO: 246 K/UL (ref 150–450)
PMV BLD AUTO: 9 FL (ref 9.2–12.9)
POTASSIUM SERPL-SCNC: 3.8 MMOL/L (ref 3.5–5.1)
PROT SERPL-MCNC: 6 G/DL (ref 6–8.4)
RBC # BLD AUTO: 3.18 M/UL (ref 4–5.4)
SODIUM SERPL-SCNC: 137 MMOL/L (ref 136–145)
WBC # BLD AUTO: 6.94 K/UL (ref 3.9–12.7)

## 2024-12-14 PROCEDURE — 25000003 PHARM REV CODE 250: Performed by: NURSE PRACTITIONER

## 2024-12-14 PROCEDURE — 63600175 PHARM REV CODE 636 W HCPCS: Performed by: INTERNAL MEDICINE

## 2024-12-14 PROCEDURE — 25000003 PHARM REV CODE 250: Performed by: INTERNAL MEDICINE

## 2024-12-14 PROCEDURE — S4991 NICOTINE PATCH NONLEGEND: HCPCS | Performed by: INTERNAL MEDICINE

## 2024-12-14 PROCEDURE — 85025 COMPLETE CBC W/AUTO DIFF WBC: CPT | Performed by: INTERNAL MEDICINE

## 2024-12-14 PROCEDURE — 99900035 HC TECH TIME PER 15 MIN (STAT)

## 2024-12-14 PROCEDURE — 99233 SBSQ HOSP IP/OBS HIGH 50: CPT | Mod: ,,, | Performed by: INTERNAL MEDICINE

## 2024-12-14 PROCEDURE — 21400001 HC TELEMETRY ROOM

## 2024-12-14 PROCEDURE — 83735 ASSAY OF MAGNESIUM: CPT | Performed by: INTERNAL MEDICINE

## 2024-12-14 PROCEDURE — 99900031 HC PATIENT EDUCATION (STAT)

## 2024-12-14 PROCEDURE — 94761 N-INVAS EAR/PLS OXIMETRY MLT: CPT

## 2024-12-14 PROCEDURE — 94640 AIRWAY INHALATION TREATMENT: CPT

## 2024-12-14 PROCEDURE — 25000242 PHARM REV CODE 250 ALT 637 W/ HCPCS: Performed by: INTERNAL MEDICINE

## 2024-12-14 PROCEDURE — 27000221 HC OXYGEN, UP TO 24 HOURS

## 2024-12-14 PROCEDURE — 94664 DEMO&/EVAL PT USE INHALER: CPT

## 2024-12-14 PROCEDURE — 36415 COLL VENOUS BLD VENIPUNCTURE: CPT | Performed by: INTERNAL MEDICINE

## 2024-12-14 PROCEDURE — 80053 COMPREHEN METABOLIC PANEL: CPT | Performed by: INTERNAL MEDICINE

## 2024-12-14 RX ORDER — ISOSORBIDE DINITRATE 10 MG/1
10 TABLET ORAL 3 TIMES DAILY
Status: DISCONTINUED | OUTPATIENT
Start: 2024-12-14 | End: 2024-12-14

## 2024-12-14 RX ORDER — METOPROLOL SUCCINATE 25 MG/1
25 TABLET, EXTENDED RELEASE ORAL DAILY
Status: COMPLETED | OUTPATIENT
Start: 2024-12-14 | End: 2024-12-14

## 2024-12-14 RX ORDER — IPRATROPIUM BROMIDE AND ALBUTEROL SULFATE 2.5; .5 MG/3ML; MG/3ML
3 SOLUTION RESPIRATORY (INHALATION) EVERY 6 HOURS
Status: DISCONTINUED | OUTPATIENT
Start: 2024-12-14 | End: 2024-12-17 | Stop reason: HOSPADM

## 2024-12-14 RX ORDER — ISOSORBIDE DINITRATE 10 MG/1
20 TABLET ORAL 3 TIMES DAILY
Status: DISCONTINUED | OUTPATIENT
Start: 2024-12-14 | End: 2024-12-17 | Stop reason: HOSPADM

## 2024-12-14 RX ADMIN — METOPROLOL SUCCINATE 25 MG: 25 TABLET, FILM COATED, EXTENDED RELEASE ORAL at 09:12

## 2024-12-14 RX ADMIN — CEPHALEXIN 250 MG: 250 CAPSULE ORAL at 12:12

## 2024-12-14 RX ADMIN — CEPHALEXIN 250 MG: 250 CAPSULE ORAL at 11:12

## 2024-12-14 RX ADMIN — NICOTINE 1 PATCH: 14 PATCH, EXTENDED RELEASE TRANSDERMAL at 09:12

## 2024-12-14 RX ADMIN — CEPHALEXIN 250 MG: 250 CAPSULE ORAL at 05:12

## 2024-12-14 RX ADMIN — METOPROLOL SUCCINATE 50 MG: 50 TABLET, FILM COATED, EXTENDED RELEASE ORAL at 09:12

## 2024-12-14 RX ADMIN — Medication 400 MG: at 09:12

## 2024-12-14 RX ADMIN — HYDROCODONE BITARTRATE AND ACETAMINOPHEN 1 TABLET: 5; 325 TABLET ORAL at 10:12

## 2024-12-14 RX ADMIN — ASPIRIN 81 MG 81 MG: 81 TABLET ORAL at 09:12

## 2024-12-14 RX ADMIN — ISOSORBIDE DINITRATE 20 MG: 10 TABLET ORAL at 02:12

## 2024-12-14 RX ADMIN — APIXABAN 5 MG: 5 TABLET, FILM COATED ORAL at 09:12

## 2024-12-14 RX ADMIN — MIRTAZAPINE 15 MG: 7.5 TABLET, FILM COATED ORAL at 09:12

## 2024-12-14 RX ADMIN — HYDRALAZINE HYDROCHLORIDE 10 MG: 20 INJECTION, SOLUTION INTRAMUSCULAR; INTRAVENOUS at 07:12

## 2024-12-14 RX ADMIN — HYDRALAZINE HYDROCHLORIDE 50 MG: 25 TABLET ORAL at 05:12

## 2024-12-14 RX ADMIN — HYDRALAZINE HYDROCHLORIDE 50 MG: 25 TABLET ORAL at 09:12

## 2024-12-14 RX ADMIN — IPRATROPIUM BROMIDE AND ALBUTEROL SULFATE 3 ML: .5; 3 SOLUTION RESPIRATORY (INHALATION) at 06:12

## 2024-12-14 RX ADMIN — ISOSORBIDE DINITRATE 10 MG: 10 TABLET ORAL at 09:12

## 2024-12-14 RX ADMIN — DOXYCYCLINE HYCLATE 100 MG: 100 CAPSULE ORAL at 09:12

## 2024-12-14 RX ADMIN — IPRATROPIUM BROMIDE AND ALBUTEROL SULFATE 3 ML: .5; 3 SOLUTION RESPIRATORY (INHALATION) at 01:12

## 2024-12-14 RX ADMIN — ATORVASTATIN CALCIUM 40 MG: 40 TABLET, FILM COATED ORAL at 09:12

## 2024-12-14 RX ADMIN — HYDROCODONE BITARTRATE AND ACETAMINOPHEN 1 TABLET: 5; 325 TABLET ORAL at 06:12

## 2024-12-14 RX ADMIN — AMLODIPINE BESYLATE 10 MG: 5 TABLET ORAL at 09:12

## 2024-12-14 RX ADMIN — HYDRALAZINE HYDROCHLORIDE 50 MG: 25 TABLET ORAL at 02:12

## 2024-12-14 RX ADMIN — ISOSORBIDE DINITRATE 20 MG: 10 TABLET ORAL at 09:12

## 2024-12-14 RX ADMIN — IPRATROPIUM BROMIDE AND ALBUTEROL SULFATE 3 ML: .5; 3 SOLUTION RESPIRATORY (INHALATION) at 03:12

## 2024-12-14 RX ADMIN — AMIODARONE HYDROCHLORIDE 200 MG: 200 TABLET ORAL at 09:12

## 2024-12-14 RX ADMIN — FERROUS SULFATE TAB 325 MG (65 MG ELEMENTAL FE) 1 EACH: 325 (65 FE) TAB at 09:12

## 2024-12-14 RX ADMIN — IPRATROPIUM BROMIDE AND ALBUTEROL SULFATE 3 ML: .5; 3 SOLUTION RESPIRATORY (INHALATION) at 07:12

## 2024-12-14 NOTE — ASSESSMENT & PLAN NOTE
With slurred speech and confusion     Home meds for hypertension were reviewed and noted below.   Hypertension Medications               amLODIPine (NORVASC) 2.5 MG tablet Take 1 tablet (2.5 mg total) by mouth every evening.    metoprolol succinate (TOPROL-XL) 25 MG 24 hr tablet Take 1 tablet (25 mg total) by mouth once daily.          Norvasc increased to 10 mg  Cont metoprolol   Increase isosorbide dinitrate 20 mg p.o. t.i.d.  PO hydralazine and Lasix added

## 2024-12-14 NOTE — PROGRESS NOTES
"[facility] Medicine  Progress Note    Patient Name: Iris Mueller  MRN: [id number]  Patient Class: IP- Inpatient   Admission Date: 12/7/2024  Length of Stay: 6 days  Attending Physician: Yazmin Jules MD  Primary Care Provider: Elkin You MD        Subjective     Principal Problem:AMS (altered mental status)        HPI:  The patient is a 69-year-old  female with a history of hypertension, CKD-4, atrial fibrillation (on Eliquis), morbid obesity, COPD, hyperlipidemia, CAD, aortic stenosis/regurgitation, stroke, and history of systolic/diastolic congestive heart failure.  Her last echo performed in 2022 showed, per Dr. Wright:  ·   "The estimated ejection fraction is 40%. There is a anterior apical segment that is hypo to akinetic with a wall motion abnormality  · Grade II left ventricular diastolic dysfunction. Mean left atrial pressure 20 mm Hg. Mild mitral annular calcification  · There are segmental left ventricular wall motion abnormalities.  · Normal right ventricular size with normal right ventricular systolic function.  · Moderate left atrial enlargement.  · Moderate aortic regurgitation.  · There is moderate aortic valve stenosis.  · Aortic valve area is 1.55 cm2; peak velocity is 2.88 m/s; mean gradient is 15 mmHg.  · Moderate mitral regurgitation.  · There is mild pulmonary hypertension.  · Mild tricuspid regurgitation.  · Elevated central venous pressure (15 mmHg).  · The estimated PA systolic pressure is 47 mmHg."    Please note the obtaining history from the patient is impossible, given her current mental state.      The patient lives home with her  who has visual and hearing impediment.  Thus, the majority of the history is obtained from the ER physician, records, and 2 adult children at bedside.    Her children at bedside chair concern that the patient takes her prescribed narcotics inappropriately.  The patient was brought in tonight because of " "recurrent falls.  Over the last 2-3 weeks, the patient had complained that she was "off balance" and had fallen 7-8 times.    Around the 630 p.m., family worse talking to the patient on the phone apparently and knows that she had slurred speech and that she was "stuck on the Ottoman inked".  Somehow, the patient reached the neighbor and by the time the children got to the house she was sitting in her recliner (has been moved from the dining room chair, presumably with the assistance of the neighbor).  Thus she was conducted to the ER.      While here she was afebrile but hypertensive at 224/109 and this came down to 146/108.  NIH was 1.    Workup showed that head CT for stroke was negative.  She had multiple imaging studies including bilateral hip x-rays with pelvis, chest x-ray, left humerus, left shoulder, and CT cervical spine without contrast, all of which were unrevealing.    MRI brain without contrast showed (with an addenda, per radiologist Dr. Narvaez):    "Impression:     No acute ischemic process. Moderate to advanced periventricular and deep white matter changes of chronic microvascular ischemia. GRE sequence demonstrates punctate focus of susceptibility artifact in the right cerebellum and left basal ganglia representing microhemorrhage or calcification."    Furthermore, the patient had an MRI brain without contrast which showed, per radiologist Dr. Burnett:    Impression:     "Examination is significantly limited secondary to motion with apparent absence of flow related enhancement within the left middle cerebral artery branches, left vertebral artery, and the left posterior cerebral artery.  This is most likely artifactual.     CTA may be attempted for further evaluation."    She was evaluated by vascular Neurology and my understanding is that she was not considered an intervention candidate.  Additionally, as her last known normal was not precisely known (as she is already fully anticoagulated with " Eliquis), she was not considered to be a tenecteplase candidate, either.  She is admitted for further diagnosis, treatment, and care.    Overview/Hospital Course:  Patient is a 69 year old female with history of HTN, presented with recurrent falls, and slurred speech. Patient was found to be in hypertensive emergency on admission. CT C spine and head was negative. MRI brain was negative for acute findings. Showed punctate focus of susceptibility artifact in the right cerebellum and left basal ganglia representing microhemorrhage or calcification. Neurology was consulted. Patient was started on aspirin and Lipitor for possible TIA. Her home Eliquis was resumed. Patient was also started on PO Doxycycline for right lower extremity cellulitis. Nephrology consulted per family request for CKD IV. Patient continue to have episodes of confusion. Dementia work up sent. Patient worked with PT, OT and recommended SNF.  Norvasc was increased. BP better controlled. Mental status improved. Patient discharged to [place] for SNF. PO Doxy to continue for 10 days total. Patient was discharged but didn't leave as the facility did not take her due to BP being variable. Patient discharged to [place]. BP medications and antibiotics as below.     Interval History:  Patient is seen and examined during multidisciplinary rounds.  Blood pressure remains elevated requiring use of intravenous hydralazine this morning.  Patient undergoing renal artery Doppler ultrasound this morning.  Patient agreeable to go home with home health and home physical therapy.  Denies any chest pain, shortness of breath and currently afebrile.      Review of Systems  Objective:     Vital Signs (Most Recent):  Temp: 97.8 °F (36.6 °C) (12/14/24 0342)  Pulse: 75 (12/14/24 0828)  Resp: 20 (12/14/24 0730)  BP: (!) 155/74 (12/14/24 0828)  SpO2: (!) 94 % (12/14/24 0730) Vital Signs (24h Range):  Temp:  [97.7 °F (36.5 °C)-98 °F (36.7 °C)] 97.8 °F (36.6 °C)  Pulse:   [] 75  Resp:  [18-28] 20  SpO2:  [90 %-98 %] 94 %  BP: (115-192)/() 155/74     Weight: 110.9 kg (244 lb 7.8 oz)  Body mass index is 43.31 kg/m².    Intake/Output Summary (Last 24 hours) at 12/14/2024 0903  Last data filed at 12/14/2024 0457  Gross per 24 hour   Intake 240 ml   Output 500 ml   Net -260 ml         Physical Exam  Constitutional:       General: She is not in acute distress.     Appearance: She is obese. She is not ill-appearing, toxic-appearing or diaphoretic.   HENT:      Head: Normocephalic and atraumatic.   Eyes:      General: No scleral icterus.        Right eye: No discharge.         Left eye: No discharge.   Neck:      Comments: No retractions; no nuchal rigidity  Cardiovascular:      Rate and Rhythm: Regular rhythm.      Heart sounds: Murmur (3/6 systolic murmur) heard.      No friction rub. No gallop.   Pulmonary:      Effort: Pulmonary effort is normal.      Breath sounds: Normal breath sounds.   Abdominal:      General: Bowel sounds are normal. There is no distension.      Palpations: Abdomen is soft. There is no mass.      Tenderness: There is no abdominal tenderness.   Musculoskeletal:      Right lower leg: Edema present improved.      Left lower leg: Edema present improved.   Skin:     General: Skin is warm and dry. Redness on both legs improved   Neurological:      Comments: no focal signs. Oriented to place and person and time.        Significant Labs: All pertinent labs within the past 24 hours have been reviewed.  CBC:   Recent Labs   Lab 12/13/24 0437 12/14/24 0247   WBC 6.52 6.94   HGB 8.2* 8.3*   HCT 28.1* 28.6*    246     CMP:   Recent Labs   Lab 12/13/24 0437 12/14/24  0247    137   K 3.8 3.8    102   CO2 26 27    94   BUN 21 20   CREATININE 1.7* 1.7*   CALCIUM 8.3* 8.5*   PROT 5.8* 6.0   ALBUMIN 3.3* 3.4*   BILITOT 0.7 0.7   ALKPHOS 132 138*   AST 11 14   ALT 10 11   ANIONGAP 7* 8     Microbiology Results (last 7 days)       Procedure  Component Value Units Date/Time    Urine culture [[id number]]  (Abnormal)  (Susceptibility) Collected: 12/08/24 0530    Order Status: Completed Specimen: Urine Updated: 12/10/24 0619     Urine Culture, Routine ESCHERICHIA COLI  >100,000 cfu/ml      Narrative:      Specimen Source->Urine          Significant Imaging:   ECHO:    Left Ventricle: The left ventricle is normal in size. Moderately increased wall thickness. Unable to assess wall motion. There is normal systolic function with a visually estimated ejection fraction of 55 - 60%. Unable to assess diastolic function due to atrial fibrillation.    Right Ventricle: Right ventricle was not well visualized due to poor acoustic window.    Left Atrium: Left atrium is severely dilated.    Right Atrium: Right atrium is severely dilated.    Aortic Valve: There is moderate to severe stenosis. Aortic valve area by VTI is 1.0 cm². Aortic valve peak velocity is 3.4 m/s. Mean gradient is 27.0 mmHg. The dimensionless index is 0.32. There is mild aortic regurgitation.    IVC/SVC: Elevated venous pressure at 15 mmHg.    Renal Artery US: Pending    CT Head:  No CT evidence of acute intracranial abnormality. If the patient has an acute, focal neurological deficit, MRI of the brain may be indicated.  Generalized cerebral volume loss and chronic microvascular ischemic changes.    B/L hip x-rays: No acute findings.     CXR: No acute findings     Left humerus x-ray:  Osteopenia. No acute fracture or dislocation. Moderate glenohumeral osteoarthritis. Nonspecific calcifications project over the soft tissues of proximal arm.     Left shoulder x-ray: Osteopenia. No acute fracture or dislocation. Moderate to advanced glenohumeral and mild AC joint osteoarthritis. Nonspecific soft tissue calcification projects over the shoulder.     CT C-spine:  No acute trauma.     MRI Brain without contrast:  No acute ischemic process. Moderate to advanced periventricular and deep white matter changes of  chronic microvascular ischemia. GRE sequence demonstrates punctate focus of susceptibility artifact in the right cerebellum and left basal ganglia representing microhemorrhage or calcification.     MRA Brain:  Examination is significantly limited secondary to motion with apparent absence of flow related enhancement within the left middle cerebral artery branches, left vertebral artery, and the left posterior cerebral artery.  This is most likely artifactual.  CTA may be attempted for further evaluation.    Bilateral lower extremity venous Doppler study:   No evidence of bilateral lower extremity DVT       Assessment and Plan     * AMS (altered mental status)  Acute encephalopathy vs progressive dementia with delirium   MRI negative for acute stroke   Could be related to hypertensive encephalopathy however family states symptoms has been going on for about 2 months   normal B12, folate RPR and ammonia level   Improved on discharge   - Cont aspirin, Lipitor, Eliquis     Acute cystitis without hematuria  PO Keflex per sensitivity for 7 days       Cellulitis of right lower extremity  US DVT negative  PO Doxycycline D5, for total 10 days       Hypertensive emergency  With slurred speech and confusion     Home meds for hypertension were reviewed and noted below.   Hypertension Medications               amLODIPine (NORVASC) 2.5 MG tablet Take 1 tablet (2.5 mg total) by mouth every evening.    metoprolol succinate (TOPROL-XL) 25 MG 24 hr tablet Take 1 tablet (25 mg total) by mouth once daily.          Norvasc increased to 10 mg  Cont metoprolol   PO hydralazine and Lasix added     Tobacco dependency  Nicotine patch    Chronic combined systolic and diastolic heart failure  As above    Pulmonary hypertension    As above    Nonrheumatic aortic valve stenosis with regurgitation  Echo shows moderate to severe stenosis     Persistent atrial fibrillation  Resume home Amiodarone  Resume metoprolol increase to 50 mg   Per records,  patient has a CHADS-VASc score of 3  Resume Eliquis     Morbid obesity  Body mass index is 43.31 kg/m².   Complicates all aspects of care        CKD (chronic kidney disease) stage 4, GFR 15-29 ml/min  Creatinine at baseline  Minimize nephrotoxic medications  Nephrology consulted per family request   Cr remained stable     Discussed with  and bedside nurse regarding discharge planning.  VTE Risk Mitigation (From admission, onward)           Ordered     apixaban tablet 5 mg  2 times daily         12/09/24 0748     IP VTE HIGH RISK PATIENT  Once         12/08/24 0234     Place sequential compression device  Until discontinued         12/08/24 0234                    Discharge Planning   VALERIANO: 12/15/2024     Code Status: Full Code   Medical Readiness for Discharge Date: 12/11/2024  Discharge Plan A: Home Health   Discharge Delays: None known at this time      Yazmin Jules MD  Department of Hospital Medicine   [facility]

## 2024-12-14 NOTE — CONSULTS
[facility]  Department of Cardiology  Consult Note      PATIENT NAME: Iris Mueller  MRN: [id number]  TODAY'S DATE: 2024  ADMIT DATE: 2024                          CONSULT REQUESTED BY: Yazmin Jules MD    SUBJECTIVE     PRINCIPAL PROBLEM: AMS (altered mental status)      REASON FOR CONSULT:  uncontrolled hypertension, family request      HPI:  Patient is 69-year-old female with PMH hypertension, CKD, AFib, COPD, CAD, hyperlipidemia, CVA, heart failure, aortic stenosis, who is currently hospitalized since 2024 for altered mental status, acute cystitis, cellulitis of right lower extremity, and hypertensive emergency.  Patient tells us she has been weak, off balance, and fell 4 times in the past 1 week.  Daughter was involved in consultation on speaker phone.  She states they have tried to get patient to [facility] but they have refused admits due to high heart rate and blood pressure.        Review of patient's allergies indicates:  No Known Allergies    Past Medical History:   Diagnosis Date    Anemia, unspecified     Arthritis     Asthma     COPD (chronic obstructive pulmonary disease)     Encounter for blood transfusion     Hypertension     Obese body habitus     Wound infection 2019    Right knee     Past Surgical History:   Procedure Laterality Date    ANGIOGRAM, CORONARY, WITH LEFT HEART CATHETERIZATION N/A 2022    Procedure: Angiogram, Coronary, with Left Heart Cath;  Surgeon: Jorge Tran MD;  Location: [facility] CATH/EP LAB;  Service: Cardiology;  Laterality: N/A;     SECTION      ECHOCARDIOGRAM,TRANSESOPHAGEAL N/A 2023    Procedure: Transesophageal echo (MARIANO) intra-procedure log documentation;  Surgeon: Araceli Sue Diagnostic;  Location: [facility] EP LAB;  Service: Cardiology;  Laterality: N/A;    INCISION AND DRAINAGE OF HEMATOMA Left 2024    Procedure: INCISION AND DRAINAGE, HEMATOMA;  Surgeon: Bryson Huerta MD;  Location: [facility] OR;   Service: Orthopedics;  Laterality: Left;    JOINT REPLACEMENT      Knee    KNEE ARTHROPLASTY Right 8/14/2019    Procedure: ARTHROPLASTY, KNEE;  Surgeon: Delio Chung MD;  Location: [facility] OR;  Service: Orthopedics;  Laterality: Right;  anesthesia:  general and block    REPLACEMENT OF WOUND VACUUM-ASSISTED CLOSURE DEVICE Right 9/12/2019    Procedure: REPLACEMENT, WOUND VAC;  Surgeon: Delio Chung MD;  Location: [facility] OR;  Service: Orthopedics;  Laterality: Right;    TONSILLECTOMY      TREATMENT OF CARDIAC ARRHYTHMIA N/A 8/31/2023    Procedure: Cardioversion or Defibrillation;  Surgeon: PJ Betancourt MD;  Location: [facility] EP LAB;  Service: Cardiology;  Laterality: N/A;  AF, MARIANO, DCCV, MAC, EH, 3 Prep    VENTRICULOGRAM, LEFT  12/23/2022    Procedure: Ventriculogram, Left;  Surgeon: Jorge Tran MD;  Location: [facility] CATH/EP LAB;  Service: Cardiology;;     Social History     Tobacco Use    Smoking status: Every Day     Current packs/day: 0.25     Average packs/day: 0.5 packs/day for 50.0 years (23.7 ttl pk-yrs)     Types: Cigarettes     Start date: 1975     Passive exposure: Current    Smokeless tobacco: Never    Tobacco comments:     Pt states she is a 46 year smoker, smokes around 5-8 cigarettes per day. Interested in nicotine replacement while admitted. Information given on outpatient smoking cessation.   Substance Use Topics    Alcohol use: Yes     Comment: RARELY    Drug use: Never        REVIEW OF SYSTEMS  Negative except as mentioned in HPI    OBJECTIVE     VITAL SIGNS (Most Recent)  Temp: 97.8 °F (36.6 °C) (12/14/24 0342)  Pulse: 87 (12/14/24 1319)  Resp: 18 (12/14/24 1319)  BP: (!) 142/79 (12/14/24 1127)  SpO2: 98 % (12/14/24 1319)    VENTILATION STATUS  Resp: 18 (12/14/24 1319)  SpO2: 98 % (12/14/24 1319)  Oxygen Concentration (%):  [28] 28        I & O (Last 24H):  Intake/Output Summary (Last 24 hours) at 12/14/2024 1613  Last data filed at 12/14/2024 1530  Gross per 24 hour   Intake 240 ml    Output 1300 ml   Net -1060 ml       WEIGHTS  Wt Readings from Last 3 Encounters:   12/13/24 0352 110.9 kg (244 lb 7.8 oz)   12/12/24 0510 108.7 kg (239 lb 10.2 oz)   12/08/24 0500 106 kg (233 lb 10.2 oz)   12/07/24 1944 99.8 kg (220 lb)   12/08/24 1335 106 kg (233 lb 11 oz)   10/30/24 1057 96.2 kg (212 lb)       PHYSICAL EXAM    GENERAL:  Elderly female out of bed to chair breathing comfortably in no apparent distress.  HEENT: Normocephalic.    NECK: No JVD.   CARDIAC: IRRegular rate and rhythm. Avg HR  bpm; + systolic murmur  CHEST ANATOMY: bruising  LUNGS:  Moist cough, no dyspnea, diminished breath sounds to bases  ABDOMEN: Soft, obese .  Normal bowel sounds.    EXTREMITIES:  2+ edema to lower extremities, erythema to lower extremities below-the-knee  CENTRAL NERVOUS SYSTEM: AAO x 3  SKIN:  Profound bruising bilateral arms    HOME MEDICATIONS:  Current Facility-Administered Medications on File Prior to Encounter   Medication Dose Route Frequency Provider Last Rate Last Admin    lidocaine (PF) 10 mg/ml (1%) injection 5 mg  0.5 mL Intradermal Once Azeem Anderson MD         Current Outpatient Medications on File Prior to Encounter   Medication Sig Dispense Refill    amiodarone (PACERONE) 200 MG Tab Take 1 tablet (200 mg total) by mouth once daily. 30 tablet 11    apixaban (ELIQUIS) 5 mg Tab Take 5 mg by mouth 2 (two) times daily.      buPROPion (WELLBUTRIN SR) 150 MG TBSR 12 hr tablet Take 1 tablet (150 mg total) by mouth 2 (two) times daily. 180 tablet 3    FLUoxetine 40 MG capsule Take 1 capsule (40 mg total) by mouth once daily. 90 capsule 3    mirtazapine (REMERON) 15 MG tablet Take 1 tablet (15 mg total) by mouth every evening. For sleep 30 tablet 3    mupirocin (BACTROBAN) 2 % ointment Apply topically 2 (two) times daily. To infected skin tear 22 g 1    triamcinolone acetonide 0.1% (KENALOG) 0.1 % cream Apply topically 2 (two) times daily. 60 g 2       SCHEDULED MEDS:   albuterol-ipratropium  3 mL  "Nebulization Q6H    amiodarone  200 mg Oral Daily    amLODIPine  10 mg Oral Daily    apixaban  5 mg Oral BID    aspirin  81 mg Oral Daily    atorvastatin  40 mg Oral QHS    cephALEXin  250 mg Oral Q6H    doxycycline  100 mg Oral Q12H    ferrous sulfate  1 tablet Oral Daily    furosemide  20 mg Oral Every Mon, Wed, Fri    hydrALAZINE  50 mg Oral Q8H    isosorbide dinitrate  20 mg Oral TID    magnesium oxide  400 mg Oral Daily    metoprolol succinate  50 mg Oral Daily    mirtazapine  15 mg Oral QHS       CONTINUOUS INFUSIONS:    PRN MEDS:  Current Facility-Administered Medications:     acetaminophen, 650 mg, Oral, Q6H PRN    dextromethorphan-guaiFENesin  mg/5 ml, 15 mL, Oral, Q6H PRN    gabapentin, 300 mg, Oral, Nightly PRN    hydrALAZINE, 10 mg, Intravenous, Q4H PRN    HYDROcodone-acetaminophen, 1 tablet, Oral, Q6H PRN    ondansetron, 4 mg, Intravenous, Q6H PRN    LABS AND DIAGNOSTICS     CBC LAST 3 DAYS  Recent Labs   Lab 12/12/24 0438 12/13/24 0437 12/14/24  0247   WBC 7.34 6.52 6.94   RBC 3.38* 3.10* 3.18*   HGB 8.8* 8.2* 8.3*   HCT 30.8* 28.1* 28.6*   MCV 91 91 90   MCH 26.0* 26.5* 26.1*   MCHC 28.6* 29.2* 29.0*   RDW 19.1* 19.0* 19.0*    236 246   MPV 8.9* 9.0* 9.0*   GRAN 78.0*  5.7 77.7*  5.1 78.0*  5.4   LYMPH 9.7*  0.7* 9.7*  0.6* 9.9*  0.7*   MONO 8.0  0.6 8.1  0.5 7.1  0.5   BASO 0.03 0.03 0.03   NRBC 0 0 0       COAGULATION LAST 3 DAYS  Recent Labs   Lab 12/07/24 1955   INR 1.1       CHEMISTRY LAST 3 DAYS  Recent Labs   Lab 12/12/24 0438 12/13/24 0437 12/14/24  0247   * 136 137   K 4.1 3.8 3.8    103 102   CO2 26 26 27   ANIONGAP 7* 7* 8   BUN 21 21 20   CREATININE 1.8* 1.7* 1.7*   GLU 91 105 94   CALCIUM 8.5* 8.3* 8.5*   MG 1.6 1.4* 1.8   ALBUMIN 3.6 3.3* 3.4*   PROT 6.4 5.8* 6.0   ALKPHOS 147* 132 138*   ALT 11 10 11   AST 11 11 14   BILITOT 0.7 0.7 0.7       CARDIAC PROFILE LAST 3 DAYS  No results for input(s): "BNP", "CPK", "CPKMB", "LDH", "TROPONINI" in the last " "168 hours.    ENDOCRINE LAST 3 DAYS  Recent Labs   Lab 12/07/24 1955   TSH 1.814       LAST ARTERIAL BLOOD GAS  ABG  No results for input(s): "PH", "PO2", "PCO2", "HCO3", "BE" in the last 168 hours.    LAST 7 DAYS MICROBIOLOGY   Microbiology Results (last 7 days)       Procedure Component Value Units Date/Time    Urine culture [[id number]]  (Abnormal)  (Susceptibility) Collected: 12/08/24 0530    Order Status: Completed Specimen: Urine Updated: 12/10/24 0619     Urine Culture, Routine ESCHERICHIA COLI  >100,000 cfu/ml      Narrative:      Specimen Source->Urine            MOST RECENT IMAGING  US Renal Artery Stenosis [facility]  Narrative: CLINICAL HISTORY:  ([id number])70 y/o  (1955) F    Htn uncontrolled;    TECHNIQUE:  (A#[id number], exam time 12/14/2024 9:50)    US RENAL ARTERY STENOSIS [facility] [id number]    A complete ultrasound examination of the retroperitoneum and a color flow and spectral Doppler studies of the renal vasculature were performed.    COMPARISON:  None available.    FINDINGS:  Please note this examination is technically suboptimal due to patient related factors  including increased respiratory rate/motion, inability to breath hold, body habitus as well as overlying bowel gas.    Right KIDNEY: Normal in size. Mildly increased in echotexture.  There is no evidence of renal cysts stones large enough to cause acoustic shadowing contour-deforming mass or hydronephrosis.    Renal size: 10.3 x 6.1 x 4.2 cm    Left KIDNEY: Small in size and increased in echotexture.  There is an 18 x 17 mm exophytic simple cyst in the upper pole the left kidney.  No hydronephrosis, shadowing stone or contour deforming solid renal masses identified.    Renal size: 6.1 x 3.2 x 3.2 cm    Urinary bladder: Normally distended. No stones  wall thickening  or focal mass.    Additional findings:    -the AORTA and IVC are within normal limits where visualized.    VASCULAR examination:    Abdominal aorta (PSV in the " region of the main renal arteries): 45 cm/s    Right renal vascular exam: The resistive indices are elevated, in combination with the echogenic kidney compatible with intrinsic medical renal disease.  In addition there appears to be a relative elevated ratio in the mid renal artery suggesting renal artery stenosis.    Resistive index (upper, mid, lower): 1, 1, 0.9    Main renal artery PSV (origin, mid, hilar): 94 cm/s, 114 cm/s, and 61 cm/s    RAR/Ao PSV ratio: 2.5    Renal vein peak velocity: 26 cm/s    Left renal vascular exam: The resistive index in the lower pole appears elevated suggesting a component of intrinsic medical renal disease.  The peak systolic velocity (PSV), and renal artery/aortic ratio is top-normal, without convincing focal stenosis identified.    Resistive index (upper, mid, lower): 0.7, 0.6, and 1    Main renal artery PSV (origin, mid, hilar): 75 cm/s, 77 cm/s, and 79 cm/s    RAR/Ao PSV ratio: 1.8    Renal vein peak velocity: 58 cm/s  Impression: 1.  Small echogenic left kidney compatible with chronic medical renal disease (with no hydronephrosis or shadowing stone in either kidney).    2.  Echogenic right kidney, with elevated resistive indices compatible with intrinsic medical renal disease.    3.  Bilateral simple renal cortical cysts.    4.  Relative elevated velocity and ratio in the right kidney suggesting a degree of renal artery stenosis on the right.    Electronically signed by: Jeffry Ellis  Date:    12/14/2024  Time:    09:56      ECHOCARDIOGRAM RESULTS (last 5)  Results for orders placed during the hospital encounter of 12/07/24    Echo Saline Bubble? No    Interpretation Summary    Left Ventricle: The left ventricle is normal in size. Moderately increased wall thickness. Unable to assess wall motion. There is normal systolic function with a visually estimated ejection fraction of 55 - 60%. Unable to assess diastolic function due to atrial fibrillation.    Right Ventricle: Right  ventricle was not well visualized due to poor acoustic window.    Left Atrium: Left atrium is severely dilated.    Right Atrium: Right atrium is severely dilated.    Aortic Valve: There is moderate to severe stenosis. Aortic valve area by VTI is 1.0 cm². Aortic valve peak velocity is 3.4 m/s. Mean gradient is 27.0 mmHg. The dimensionless index is 0.32. There is mild aortic regurgitation.    IVC/SVC: Elevated venous pressure at 15 mmHg.      Results for orders placed during the hospital encounter of 08/31/23    Transesophageal echo (MARIANO)    Interpretation Summary    MARIANO to evaluate the LA/WENCESLAO prior to DCCV.    Left Ventricle: The left ventricle is normal in size. Normal wall thickness. Normal wall motion. There is low normal systolic function with a visually estimated ejection fraction of 50 - 55%.    Left Atrium: Left atrium is dilated. The left atrial appendage appears normal. Appendage velocity is reduced at less than 40 cm/sec. There is no thrombus in the cavity or appendage. Light spontaneous echo contrast visualized in the left atrial cavity and appendage.    Right Ventricle: Normal right ventricular cavity size. Wall thickness is normal. Right ventricle wall motion is ormal. Systolic function is normal.    Right Atrium: Right atrium is dilated.    Aortic Valve: The aortic valve is a trileaflet valve. There is moderate aortic valve sclerosis. Mildly restricted motion. Aortic valve stenosis is present, but unable to grade severity of AS. There is mild aortic regurgitation.    Mitral Valve: Mild mitral annular calcification. There is mild regurgitation.    Tricuspid Valve: There is mild regurgitation.    Aorta: Grade 2 atherosclerosis of the descending aorta.      Results for orders placed during the hospital encounter of 12/22/22    Echo    Interpretation Summary  · The estimated ejection fraction is 40%. There is a anterior apical segment that is hypo to akinetic with a wall motion abnormality  · Grade II left  ventricular diastolic dysfunction. Mean left atrial pressure 20 mm Hg. Mild mitral annular calcification  · There are segmental left ventricular wall motion abnormalities.  · Normal right ventricular size with normal right ventricular systolic function.  · Moderate left atrial enlargement.  · Moderate aortic regurgitation.  · There is moderate aortic valve stenosis.  · Aortic valve area is 1.55 cm2; peak velocity is 2.88 m/s; mean gradient is 15 mmHg.  · Moderate mitral regurgitation.  · There is mild pulmonary hypertension.  · Mild tricuspid regurgitation.  · Elevated central venous pressure (15 mmHg).  · The estimated PA systolic pressure is 47 mmHg.      Results for orders placed during the hospital encounter of 09/12/19    Echo Color Flow Doppler? Yes    Interpretation Summary  · Concentric left ventricular remodeling.  · Normal left ventricular systolic function. The estimated ejection fraction is 60%  · Normal LV diastolic function.  · Mild aortic regurgitation.  · Elevated central venous pressure (15 mm Hg).  · The estimated PA systolic pressure is 32 mm Hg      CURRENT/PREVIOUS VISIT EKG  Results for orders placed or performed during the hospital encounter of 12/07/24   ECG 12 lead    Collection Time: 12/07/24  8:08 PM   Result Value Ref Range    QRS Duration 112 ms    OHS QTC Calculation 490 ms    Narrative    Test Reason : R29.818,    Vent. Rate :  85 BPM     Atrial Rate :    BPM     P-R Int :    ms          QRS Dur : 112 ms      QT Int : 412 ms       P-R-T Axes :     91  30 degrees    QTcB Int : 490 ms    Atrial fibrillation  Rightward axis  Nonspecific ST abnormality  Prolonged QT  Abnormal ECG  No previous ECGs available  Confirmed by Oli Lara ([id number]) on 12/8/2024 9:35:02 AM    Referred By: AAAREFERRAL SELF           Confirmed By: Oli Lara           ASSESSMENT/PLAN:     Active Hospital Problems    Diagnosis    *AMS (altered mental status)    Acute cystitis without hematuria    Hypertensive  emergency    Cellulitis of right lower extremity    Tobacco dependency    Chronic combined systolic and diastolic heart failure    Pulmonary hypertension    Persistent atrial fibrillation    Nonrheumatic aortic valve stenosis with regurgitation    Morbid obesity    CKD (chronic kidney disease) stage 4, GFR 15-29 ml/min       ASSESSMENT & PLAN:   Hypertension  Altered mental status  Aortic valve stenosis  Persistent atrial fibrillation  CKD  Cellulitis  Anemia    RECOMMENDATIONS:  Echocardiogram : normal EF, LA/RA sev dil, + moderate to severe AS  She will need outpatient follow-up for aortic valve monitoring  Telemetry reviewed, HR averaging  beats per minute, chronic atrial fibrillation  Continue on anticoagulation with Eliquis 5 mg b.i.d. for CVA prophylaxis  Recommend additional 25 mg of Toprol tonight x 1& re-evaluate response in the a.m.  Recommend upon discharge, pt goes to outpatient rehab facility given frequent falls      Gretta Camacho NP  [facility]  Department of Cardiology  Date of Service: 12/14/2024        I have personally interviewed and examined the patient, I have reviewed the Nurse Practitioner's history and physical, assessment, and plan. I agree with the findings and plan.      Dr. Jinny M.D.  [facility]  Department of Cardiology  Date of Service: 12/14/2024  4:13 PM

## 2024-12-14 NOTE — CARE UPDATE
12/14/24 0643   Patient Assessment/Suction   Level of Consciousness (AVPU) alert   Respiratory Effort Normal;Unlabored   Expansion/Accessory Muscles/Retractions no use of accessory muscles;no retractions;expansion symmetric   All Lung Fields Breath Sounds diminished   Rhythm/Pattern, Respiratory unlabored;pattern regular;depth regular;chest wiggle adequate;no shortness of breath reported   Cough Frequency no cough   PRE-TX-O2   Device (Oxygen Therapy) nasal cannula with humidification   $ Is the patient on Low Flow Oxygen? Yes   Flow (L/min) (Oxygen Therapy) 2   SpO2 97 %   Pulse Oximetry Type Intermittent   $ Pulse Oximetry - Multiple Charge Pulse Oximetry - Multiple   Pulse 88   Resp 19   Aerosol Therapy   $ Aerosol Therapy Charges Aerosol Treatment   Daily Review of Necessity (SVN) completed   Respiratory Treatment Status (SVN) given   Treatment Route (SVN) oxygen;mask   Patient Position HOB elevated   Post Treatment Assessment (SVN) increased aeration   Signs of Intolerance (SVN) none   Breath Sounds Post-Respiratory Treatment   Throughout All Fields Post-Treatment All Fields   Throughout All Fields Post-Treatment aeration increased   Post-treatment Heart Rate (beats/min) 89   Post-treatment Resp Rate (breaths/min) 19   Education   $ Education Bronchodilator;15 min

## 2024-12-14 NOTE — PT/OT/SLP PROGRESS
Physical Therapy      Patient Name:  Iris Mueller   MRN:  [id number]    Patient not seen today secondary to Other (Comment) (Pt declined in am just finished getting US and wanting to eat her breakfast; declined in pm waiting for bath.). Will follow-up next service date.

## 2024-12-14 NOTE — PLAN OF CARE
Problem: Adult Inpatient Plan of Care  Goal: Plan of Care Review  Outcome: Progressing  Goal: Patient-Specific Goal (Individualized)  Outcome: Progressing  Goal: Absence of Hospital-Acquired Illness or Injury  Outcome: Progressing  Goal: Optimal Comfort and Wellbeing  Outcome: Progressing  Goal: Readiness for Transition of Care  Outcome: Progressing     Problem: Fall Injury Risk  Goal: Absence of Fall and Fall-Related Injury  Outcome: Progressing     Problem: Infection  Goal: Absence of Infection Signs and Symptoms  Outcome: Progressing     Problem: Bariatric Environmental Safety  Goal: Safety Maintained with Care  Outcome: Progressing     Problem: Skin Injury Risk Increased  Goal: Skin Health and Integrity  Outcome: Progressing

## 2024-12-14 NOTE — SUBJECTIVE & OBJECTIVE
Interval History:  Patient is seen and examined during multidisciplinary rounds.  Blood pressure remains elevated requiring use of intravenous hydralazine this morning.  Patient undergoing renal artery Doppler ultrasound this morning.  Patient agreeable to go home with home health and home physical therapy.  Denies any chest pain, shortness of breath and currently afebrile.      Review of Systems  Objective:     Vital Signs (Most Recent):  Temp: 97.8 °F (36.6 °C) (12/14/24 0342)  Pulse: 75 (12/14/24 0828)  Resp: 20 (12/14/24 0730)  BP: (!) 155/74 (12/14/24 0828)  SpO2: (!) 94 % (12/14/24 0730) Vital Signs (24h Range):  Temp:  [97.7 °F (36.5 °C)-98 °F (36.7 °C)] 97.8 °F (36.6 °C)  Pulse:  [] 75  Resp:  [18-28] 20  SpO2:  [90 %-98 %] 94 %  BP: (115-192)/() 155/74     Weight: 110.9 kg (244 lb 7.8 oz)  Body mass index is 43.31 kg/m².    Intake/Output Summary (Last 24 hours) at 12/14/2024 0903  Last data filed at 12/14/2024 0457  Gross per 24 hour   Intake 240 ml   Output 500 ml   Net -260 ml         Physical Exam  Constitutional:       General: She is not in acute distress.     Appearance: She is obese. She is not ill-appearing, toxic-appearing or diaphoretic.   HENT:      Head: Normocephalic and atraumatic.   Eyes:      General: No scleral icterus.        Right eye: No discharge.         Left eye: No discharge.   Neck:      Comments: No retractions; no nuchal rigidity  Cardiovascular:      Rate and Rhythm: Regular rhythm.      Heart sounds: Murmur (3/6 systolic murmur) heard.      No friction rub. No gallop.   Pulmonary:      Effort: Pulmonary effort is normal.      Breath sounds: Normal breath sounds.   Abdominal:      General: Bowel sounds are normal. There is no distension.      Palpations: Abdomen is soft. There is no mass.      Tenderness: There is no abdominal tenderness.   Musculoskeletal:      Right lower leg: Edema present improved.      Left lower leg: Edema present improved.   Skin:     General:  Skin is warm and dry. Redness on both legs improved   Neurological:      Comments: no focal signs. Oriented to place and person and time.        Significant Labs: All pertinent labs within the past 24 hours have been reviewed.  CBC:   Recent Labs   Lab 12/13/24 0437 12/14/24 0247   WBC 6.52 6.94   HGB 8.2* 8.3*   HCT 28.1* 28.6*    246     CMP:   Recent Labs   Lab 12/13/24 0437 12/14/24 0247    137   K 3.8 3.8    102   CO2 26 27    94   BUN 21 20   CREATININE 1.7* 1.7*   CALCIUM 8.3* 8.5*   PROT 5.8* 6.0   ALBUMIN 3.3* 3.4*   BILITOT 0.7 0.7   ALKPHOS 132 138*   AST 11 14   ALT 10 11   ANIONGAP 7* 8     Microbiology Results (last 7 days)       Procedure Component Value Units Date/Time    Urine culture [[id number]]  (Abnormal)  (Susceptibility) Collected: 12/08/24 0530    Order Status: Completed Specimen: Urine Updated: 12/10/24 0619     Urine Culture, Routine ESCHERICHIA COLI  >100,000 cfu/ml      Narrative:      Specimen Source->Urine          Significant Imaging:   ECHO:    Left Ventricle: The left ventricle is normal in size. Moderately increased wall thickness. Unable to assess wall motion. There is normal systolic function with a visually estimated ejection fraction of 55 - 60%. Unable to assess diastolic function due to atrial fibrillation.    Right Ventricle: Right ventricle was not well visualized due to poor acoustic window.    Left Atrium: Left atrium is severely dilated.    Right Atrium: Right atrium is severely dilated.    Aortic Valve: There is moderate to severe stenosis. Aortic valve area by VTI is 1.0 cm². Aortic valve peak velocity is 3.4 m/s. Mean gradient is 27.0 mmHg. The dimensionless index is 0.32. There is mild aortic regurgitation.    IVC/SVC: Elevated venous pressure at 15 mmHg.    Renal Artery US: Pending    CT Head:  No CT evidence of acute intracranial abnormality. If the patient has an acute, focal neurological deficit, MRI of the brain may be  indicated.  Generalized cerebral volume loss and chronic microvascular ischemic changes.    B/L hip x-rays: No acute findings.     CXR: No acute findings     Left humerus x-ray:  Osteopenia. No acute fracture or dislocation. Moderate glenohumeral osteoarthritis. Nonspecific calcifications project over the soft tissues of proximal arm.     Left shoulder x-ray: Osteopenia. No acute fracture or dislocation. Moderate to advanced glenohumeral and mild AC joint osteoarthritis. Nonspecific soft tissue calcification projects over the shoulder.     CT C-spine:  No acute trauma.     MRI Brain without contrast:  No acute ischemic process. Moderate to advanced periventricular and deep white matter changes of chronic microvascular ischemia. GRE sequence demonstrates punctate focus of susceptibility artifact in the right cerebellum and left basal ganglia representing microhemorrhage or calcification.     MRA Brain:  Examination is significantly limited secondary to motion with apparent absence of flow related enhancement within the left middle cerebral artery branches, left vertebral artery, and the left posterior cerebral artery.  This is most likely artifactual.  CTA may be attempted for further evaluation.    Bilateral lower extremity venous Doppler study:   No evidence of bilateral lower extremity DVT

## 2024-12-14 NOTE — PROGRESS NOTES
INPATIENT NEPHROLOGY Progress Note  [facility]    Patient Name: Iris Mueller  Date: 12/14/2024    Reason for consultation: CKD IV    Chief Complaint:   Chief Complaint   Patient presents with    Headache     Fell twice today , slurred speech started at 1830       History of Present Illness:  Patient is a 69 year old female with history of HTN, presented with recurrent falls, and slurred speech. Patient was found to be in hypertensive emergency on admission. CT C spine and head was negative. MRI brain was negative for acute findings. Showed punctate focus of susceptibility artifact in the right cerebellum and left basal ganglia representing microhemorrhage or calcification. Neurology was consulted. Patient was started on aspirin and Lipitor for possible TIA. Her home Eliquis was resumed. Patient was also started on PO Doxycycline for right lower extremity cellulitis. Nephrology consulted per family request for CKD IV. Patient continue to have episodes of confusion. Dementia work up sent. Patient worked with PT, OT and recommended SNF.     Interval History:  12/9- work on BP control- then edema management  12/10  975 ml UOP recorded.  BP starting to improve--still high at times.  Na+ improved.  Renal function stable.  Sleepy, is able to converse some, though.  12/11  one shift  ml.  BP meds adjusted yesterday, BP still high.  renal function stable.  Hydralazine and lasix added today.  Pt up w/OT, feeling better.  12/12  One shift UOP recorded, 600 ml.  Still w/BP spikes, hydralazine adjusted.  Renal function stable.  Sleepy, awakens easily.   12/13 HR > 90, BP trends improved, on RA   12/14  one shift UOP recorded, 500 ml.  Pressures still high, hydralazine adjusted yest, metoprolol adjusted today.  Isordil added.  US in progress.    Plan of Care:    Assessment:  CKD IV  HTN emerg/TIA  Edema  Tachycardia  Hyponatremia  SHPT  Anemia of CKD  HypoMg    Plan:    - renal function at  baseline- improving over last few days  - not uremic- mental status issues combo of underlying age related and vascular dementia chronically and HTN emerg causing TIA acutely  - agree with norvasc, started hydralazine , adjusted   - continue lasix MWF  - adjust metoprolol today  - 2g salt, 1.5L fluid per day  - no active BMM issues  - supplement iron- DENEEN 10K today  - IV Mg today- add MgOH 400mg daily    Thank you for allowing us to participate in this patient's care. We will continue to follow.    Vital Signs:  Temp Readings from Last 3 Encounters:   24 97.8 °F (36.6 °C) (Oral)   24 97.8 °F (36.6 °C) (Oral)   24 97.6 °F (36.4 °C) (Oral)       Pulse Readings from Last 3 Encounters:   24 96   10/30/24 89   09/10/24 92       BP Readings from Last 3 Encounters:   24 (!) 192/100   10/30/24 130/80   09/10/24 132/82       Weight:  Wt Readings from Last 3 Encounters:   24 110.9 kg (244 lb 7.8 oz)   24 106 kg (233 lb 11 oz)   10/30/24 96.2 kg (212 lb)       INS/OUTS:  I/O last 3 completed shifts:  In: 480 [P.O.:480]  Out: 1000 [Urine:1000]  No intake/output data recorded.    Past Medical & Surgical History:  Past Medical History:   Diagnosis Date    Anemia, unspecified     Arthritis     Asthma     COPD (chronic obstructive pulmonary disease)     Encounter for blood transfusion     Hypertension     Obese body habitus     Wound infection 2019    Right knee       Past Surgical History:   Procedure Laterality Date    ANGIOGRAM, CORONARY, WITH LEFT HEART CATHETERIZATION N/A 2022    Procedure: Angiogram, Coronary, with Left Heart Cath;  Surgeon: Jorge Tran MD;  Location: [facility] CATH/EP LAB;  Service: Cardiology;  Laterality: N/A;     SECTION      ECHOCARDIOGRAM,TRANSESOPHAGEAL N/A 2023    Procedure: Transesophageal echo (MARIANO) intra-procedure log documentation;  Surgeon: Vikram, Araceli Diagnostic;  Location: [facility] EP LAB;  Service: Cardiology;  Laterality:  N/A;    INCISION AND DRAINAGE OF HEMATOMA Left 2/5/2024    Procedure: INCISION AND DRAINAGE, HEMATOMA;  Surgeon: Bryson Huerta MD;  Location: [facility] OR;  Service: Orthopedics;  Laterality: Left;    JOINT REPLACEMENT      Knee    KNEE ARTHROPLASTY Right 8/14/2019    Procedure: ARTHROPLASTY, KNEE;  Surgeon: Delio Chung MD;  Location: [facility] OR;  Service: Orthopedics;  Laterality: Right;  anesthesia:  general and block    REPLACEMENT OF WOUND VACUUM-ASSISTED CLOSURE DEVICE Right 9/12/2019    Procedure: REPLACEMENT, WOUND VAC;  Surgeon: Delio Chung MD;  Location: [facility] OR;  Service: Orthopedics;  Laterality: Right;    TONSILLECTOMY      TREATMENT OF CARDIAC ARRHYTHMIA N/A 8/31/2023    Procedure: Cardioversion or Defibrillation;  Surgeon: PJ Betancourt MD;  Location: [facility] EP LAB;  Service: Cardiology;  Laterality: N/A;  AF, MARIANO, DCCV, MAC, EH, 3 Prep    VENTRICULOGRAM, LEFT  12/23/2022    Procedure: Ventriculogram, Left;  Surgeon: Jorge Tran MD;  Location: [facility] CATH/EP LAB;  Service: Cardiology;;       Past Social History:  Social History     Socioeconomic History    Marital status:    Tobacco Use    Smoking status: Every Day     Current packs/day: 0.25     Average packs/day: 0.5 packs/day for 50.0 years (23.7 ttl pk-yrs)     Types: Cigarettes     Start date: 1975     Passive exposure: Current    Smokeless tobacco: Never    Tobacco comments:     Pt states she is a 46 year smoker, smokes around 5-8 cigarettes per day. Interested in nicotine replacement while admitted. Information given on outpatient smoking cessation.   Substance and Sexual Activity    Alcohol use: Yes     Comment: RARELY    Drug use: Never    Sexual activity: Not Currently     Partners: Male     Social Drivers of Health     Financial Resource Strain: Patient Declined (12/8/2024)    Overall Financial Resource Strain (CARDIA)     Difficulty of Paying Living Expenses: Patient declined   Food Insecurity: Patient Declined  (12/8/2024)    Hunger Vital Sign     Worried About Running Out of Food in the Last Year: Patient declined     Ran Out of Food in the Last Year: Patient declined   Transportation Needs: Patient Declined (12/8/2024)    TRANSPORTATION NEEDS     Transportation : Patient declined   Physical Activity: Patient Declined (12/23/2022)    Exercise Vital Sign     Days of Exercise per Week: Patient declined     Minutes of Exercise per Session: Patient declined   Stress: Patient Declined (12/8/2024)    [employer] - Occupational Stress Questionnaire     Feeling of Stress : Patient declined   Housing Stability: Patient Declined (12/8/2024)    Housing Stability Vital Sign     Unable to Pay for Housing in the Last Year: Patient declined     Homeless in the Last Year: Patient declined       Medications:  Scheduled Meds:   albuterol-ipratropium  3 mL Nebulization Q6H    amiodarone  200 mg Oral Daily    amLODIPine  10 mg Oral Daily    apixaban  5 mg Oral BID    aspirin  81 mg Oral Daily    atorvastatin  40 mg Oral QHS    cephALEXin  250 mg Oral Q6H    doxycycline  100 mg Oral Q12H    ferrous sulfate  1 tablet Oral Daily    furosemide  20 mg Oral Every Mon, Wed, Fri    hydrALAZINE  50 mg Oral Q8H    magnesium oxide  400 mg Oral Daily    metoprolol succinate  50 mg Oral Daily    mirtazapine  15 mg Oral QHS    nicotine  1 patch Transdermal Daily     Continuous Infusions:  PRN Meds:.  Current Facility-Administered Medications:     acetaminophen, 650 mg, Oral, Q6H PRN    dextromethorphan-guaiFENesin  mg/5 ml, 15 mL, Oral, Q6H PRN    gabapentin, 300 mg, Oral, Nightly PRN    hydrALAZINE, 10 mg, Intravenous, Q4H PRN    HYDROcodone-acetaminophen, 1 tablet, Oral, Q6H PRN    ondansetron, 4 mg, Intravenous, Q6H PRN  Current Facility-Administered Medications on File Prior to Encounter   Medication Dose Route Frequency Provider Last Rate Last Admin    lidocaine (PF) 10 mg/ml (1%) injection 5 mg  0.5 mL Intradermal  "Once Azeem Anderson MD         Current Outpatient Medications on File Prior to Encounter   Medication Sig Dispense Refill    amiodarone (PACERONE) 200 MG Tab Take 1 tablet (200 mg total) by mouth once daily. 30 tablet 11    apixaban (ELIQUIS) 5 mg Tab Take 5 mg by mouth 2 (two) times daily.      buPROPion (WELLBUTRIN SR) 150 MG TBSR 12 hr tablet Take 1 tablet (150 mg total) by mouth 2 (two) times daily. 180 tablet 3    FLUoxetine 40 MG capsule Take 1 capsule (40 mg total) by mouth once daily. 90 capsule 3    mirtazapine (REMERON) 15 MG tablet Take 1 tablet (15 mg total) by mouth every evening. For sleep 30 tablet 3    mupirocin (BACTROBAN) 2 % ointment Apply topically 2 (two) times daily. To infected skin tear 22 g 1    triamcinolone acetonide 0.1% (KENALOG) 0.1 % cream Apply topically 2 (two) times daily. 60 g 2       Allergies:  Patient has no known allergies.    Past Family History:  Reviewed; refer to Hospitalist Admission Note    Review of Systems:  Review of Systems - All 14 systems reviewed and negative, except as noted in HPI    Physical Exam:  BP (!) 192/100   Pulse 96   Temp 97.8 °F (36.6 °C) (Oral)   Resp 20   Ht 5' 3" (1.6 m)   Wt 110.9 kg (244 lb 7.8 oz)   SpO2 (!) 94%   BMI 43.31 kg/m²     General Appearance:    NAD, AAO x 3, cooperative, appears stated age   Head:    Normocephalic, atraumatic   Eyes:    PER, EOMI, and conjunctiva/sclera clear bilaterally        Mouth:   Moist mucus membranes, no thrush or oral lesions, normal      dentition   Back:     No CVA tenderness   Lungs:     Clear to auscultation bilaterally, no wheeze, crackles, rales      or rhonchi, symmetric air movement, respirations unlabored   Heart:    Regular rate and rhythm, S1 and S2 normal, no murmur, rub   or gallop   Abdomen:     Soft, non-tender, non-distended, bowel sounds active all four   quadrants, no RT or guarding, no masses, no organomegaly   Extremities:   Warm and well perfused, distal pulses intact, no " cyanosis or    peripheral edema   MSK:   No joint or muscle swelling, tenderness or deformity   Skin:   Skin color, texture, turgor normal, no rashes or lesions   Neurologic/Psychiatric:   CNII-XII intact, normal strength and sensation       throughout, no asterixis; normal affect, memory, judgement     and insight     Results:  Lab Results   Component Value Date     12/14/2024    K 3.8 12/14/2024     12/14/2024    CO2 27 12/14/2024    BUN 20 12/14/2024    CREATININE 1.7 (H) 12/14/2024    CALCIUM 8.5 (L) 12/14/2024    ANIONGAP 8 12/14/2024    ESTGFRAFRICA 52 (L) 04/25/2022    EGFRNONAA 45 (L) 04/25/2022       Lab Results   Component Value Date    CALCIUM 8.5 (L) 12/14/2024    PHOS 4.1 09/05/2019       Recent Labs   Lab 12/14/24  0247   WBC 6.94   RBC 3.18*   HGB 8.3*   HCT 28.6*      MCV 90   MCH 26.1*   MCHC 29.0*       I have personally reviewed pertinent radiological imaging and reports from this admission.    I have spent > 70 minutes providing care for this patient for the above diagnoses. These services have included chart/data/imaging review, evaluation, exam, formulation of plan, , note preparation, and discussions with staff involved in this patient's care.    Cristina Hahn NP    [facility]  [address] [place] [address]  FARIBA Pacheco [address]  [phone] (p)  [phone] (f)

## 2024-12-14 NOTE — ASSESSMENT & PLAN NOTE
Resume home Amiodarone  Resume metoprolol increase to 50 mg   Per records, patient has a CHADS-VASc score of 3  On Eliquis

## 2024-12-15 LAB
ALBUMIN SERPL BCP-MCNC: 3.2 G/DL (ref 3.5–5.2)
ALP SERPL-CCNC: 123 U/L (ref 55–135)
ALT SERPL W/O P-5'-P-CCNC: 9 U/L (ref 10–44)
ANION GAP SERPL CALC-SCNC: 6 MMOL/L (ref 8–16)
AST SERPL-CCNC: 12 U/L (ref 10–40)
BASOPHILS # BLD AUTO: 0.02 K/UL (ref 0–0.2)
BASOPHILS NFR BLD: 0.3 % (ref 0–1.9)
BILIRUB SERPL-MCNC: 0.6 MG/DL (ref 0.1–1)
BUN SERPL-MCNC: 20 MG/DL (ref 8–23)
CALCIUM SERPL-MCNC: 8.4 MG/DL (ref 8.7–10.5)
CHLORIDE SERPL-SCNC: 102 MMOL/L (ref 95–110)
CO2 SERPL-SCNC: 27 MMOL/L (ref 23–29)
CREAT SERPL-MCNC: 1.7 MG/DL (ref 0.5–1.4)
DIFFERENTIAL METHOD BLD: ABNORMAL
EOSINOPHIL # BLD AUTO: 0.2 K/UL (ref 0–0.5)
EOSINOPHIL NFR BLD: 3.4 % (ref 0–8)
ERYTHROCYTE [DISTWIDTH] IN BLOOD BY AUTOMATED COUNT: 19.3 % (ref 11.5–14.5)
EST. GFR  (NO RACE VARIABLE): 31.9 ML/MIN/1.73 M^2
GLUCOSE SERPL-MCNC: 91 MG/DL (ref 70–110)
HCT VFR BLD AUTO: 25.9 % (ref 37–48.5)
HGB BLD-MCNC: 7.5 G/DL (ref 12–16)
IMM GRANULOCYTES # BLD AUTO: 0.04 K/UL (ref 0–0.04)
IMM GRANULOCYTES NFR BLD AUTO: 0.6 % (ref 0–0.5)
LYMPHOCYTES # BLD AUTO: 0.7 K/UL (ref 1–4.8)
LYMPHOCYTES NFR BLD: 10.2 % (ref 18–48)
MAGNESIUM SERPL-MCNC: 1.8 MG/DL (ref 1.6–2.6)
MCH RBC QN AUTO: 26 PG (ref 27–31)
MCHC RBC AUTO-ENTMCNC: 29 G/DL (ref 32–36)
MCV RBC AUTO: 90 FL (ref 82–98)
MONOCYTES # BLD AUTO: 0.6 K/UL (ref 0.3–1)
MONOCYTES NFR BLD: 8.5 % (ref 4–15)
NEUTROPHILS # BLD AUTO: 5.3 K/UL (ref 1.8–7.7)
NEUTROPHILS NFR BLD: 77 % (ref 38–73)
NRBC BLD-RTO: 0 /100 WBC
PLATELET # BLD AUTO: 231 K/UL (ref 150–450)
PMV BLD AUTO: 9.1 FL (ref 9.2–12.9)
POTASSIUM SERPL-SCNC: 3.9 MMOL/L (ref 3.5–5.1)
PROT SERPL-MCNC: 5.5 G/DL (ref 6–8.4)
RBC # BLD AUTO: 2.88 M/UL (ref 4–5.4)
SODIUM SERPL-SCNC: 135 MMOL/L (ref 136–145)
WBC # BLD AUTO: 6.84 K/UL (ref 3.9–12.7)

## 2024-12-15 PROCEDURE — 94761 N-INVAS EAR/PLS OXIMETRY MLT: CPT

## 2024-12-15 PROCEDURE — 36415 COLL VENOUS BLD VENIPUNCTURE: CPT | Performed by: INTERNAL MEDICINE

## 2024-12-15 PROCEDURE — 21400001 HC TELEMETRY ROOM

## 2024-12-15 PROCEDURE — 94664 DEMO&/EVAL PT USE INHALER: CPT

## 2024-12-15 PROCEDURE — 25000003 PHARM REV CODE 250: Performed by: INTERNAL MEDICINE

## 2024-12-15 PROCEDURE — 99900031 HC PATIENT EDUCATION (STAT)

## 2024-12-15 PROCEDURE — 94640 AIRWAY INHALATION TREATMENT: CPT

## 2024-12-15 PROCEDURE — 80053 COMPREHEN METABOLIC PANEL: CPT | Performed by: INTERNAL MEDICINE

## 2024-12-15 PROCEDURE — 85025 COMPLETE CBC W/AUTO DIFF WBC: CPT | Performed by: INTERNAL MEDICINE

## 2024-12-15 PROCEDURE — 99232 SBSQ HOSP IP/OBS MODERATE 35: CPT | Mod: ,,, | Performed by: INTERNAL MEDICINE

## 2024-12-15 PROCEDURE — 63600175 PHARM REV CODE 636 W HCPCS: Performed by: NURSE PRACTITIONER

## 2024-12-15 PROCEDURE — 83735 ASSAY OF MAGNESIUM: CPT | Performed by: INTERNAL MEDICINE

## 2024-12-15 PROCEDURE — 25000242 PHARM REV CODE 250 ALT 637 W/ HCPCS: Performed by: INTERNAL MEDICINE

## 2024-12-15 PROCEDURE — 27000221 HC OXYGEN, UP TO 24 HOURS

## 2024-12-15 PROCEDURE — 99900035 HC TECH TIME PER 15 MIN (STAT)

## 2024-12-15 PROCEDURE — 25000003 PHARM REV CODE 250: Performed by: NURSE PRACTITIONER

## 2024-12-15 RX ORDER — METOPROLOL SUCCINATE 50 MG/1
50 TABLET, EXTENDED RELEASE ORAL 2 TIMES DAILY
Status: DISCONTINUED | OUTPATIENT
Start: 2024-12-15 | End: 2024-12-17 | Stop reason: HOSPADM

## 2024-12-15 RX ORDER — FUROSEMIDE 10 MG/ML
20 INJECTION INTRAMUSCULAR; INTRAVENOUS ONCE
Status: COMPLETED | OUTPATIENT
Start: 2024-12-15 | End: 2024-12-15

## 2024-12-15 RX ADMIN — HYDRALAZINE HYDROCHLORIDE 50 MG: 25 TABLET ORAL at 09:12

## 2024-12-15 RX ADMIN — AMLODIPINE BESYLATE 10 MG: 5 TABLET ORAL at 08:12

## 2024-12-15 RX ADMIN — HYDROCODONE BITARTRATE AND ACETAMINOPHEN 1 TABLET: 5; 325 TABLET ORAL at 09:12

## 2024-12-15 RX ADMIN — MIRTAZAPINE 15 MG: 7.5 TABLET, FILM COATED ORAL at 09:12

## 2024-12-15 RX ADMIN — DOXYCYCLINE HYCLATE 100 MG: 100 CAPSULE ORAL at 08:12

## 2024-12-15 RX ADMIN — FERROUS SULFATE TAB 325 MG (65 MG ELEMENTAL FE) 1 EACH: 325 (65 FE) TAB at 08:12

## 2024-12-15 RX ADMIN — ISOSORBIDE DINITRATE 20 MG: 10 TABLET ORAL at 08:12

## 2024-12-15 RX ADMIN — FUROSEMIDE 20 MG: 10 INJECTION, SOLUTION INTRAMUSCULAR; INTRAVENOUS at 02:12

## 2024-12-15 RX ADMIN — CEPHALEXIN 250 MG: 250 CAPSULE ORAL at 05:12

## 2024-12-15 RX ADMIN — AMIODARONE HYDROCHLORIDE 200 MG: 200 TABLET ORAL at 08:12

## 2024-12-15 RX ADMIN — METOPROLOL SUCCINATE 50 MG: 50 TABLET, FILM COATED, EXTENDED RELEASE ORAL at 08:12

## 2024-12-15 RX ADMIN — HYDRALAZINE HYDROCHLORIDE 50 MG: 25 TABLET ORAL at 01:12

## 2024-12-15 RX ADMIN — CEPHALEXIN 250 MG: 250 CAPSULE ORAL at 11:12

## 2024-12-15 RX ADMIN — DOXYCYCLINE HYCLATE 100 MG: 100 CAPSULE ORAL at 09:12

## 2024-12-15 RX ADMIN — METOPROLOL SUCCINATE 50 MG: 50 TABLET, FILM COATED, EXTENDED RELEASE ORAL at 09:12

## 2024-12-15 RX ADMIN — ASPIRIN 81 MG 81 MG: 81 TABLET ORAL at 08:12

## 2024-12-15 RX ADMIN — ISOSORBIDE DINITRATE 20 MG: 10 TABLET ORAL at 03:12

## 2024-12-15 RX ADMIN — HYDRALAZINE HYDROCHLORIDE 50 MG: 25 TABLET ORAL at 05:12

## 2024-12-15 RX ADMIN — Medication 400 MG: at 08:12

## 2024-12-15 RX ADMIN — ISOSORBIDE DINITRATE 20 MG: 10 TABLET ORAL at 09:12

## 2024-12-15 RX ADMIN — IPRATROPIUM BROMIDE AND ALBUTEROL SULFATE 3 ML: .5; 3 SOLUTION RESPIRATORY (INHALATION) at 06:12

## 2024-12-15 RX ADMIN — APIXABAN 5 MG: 5 TABLET, FILM COATED ORAL at 09:12

## 2024-12-15 RX ADMIN — ATORVASTATIN CALCIUM 40 MG: 40 TABLET, FILM COATED ORAL at 09:12

## 2024-12-15 RX ADMIN — CEPHALEXIN 250 MG: 250 CAPSULE ORAL at 12:12

## 2024-12-15 RX ADMIN — IPRATROPIUM BROMIDE AND ALBUTEROL SULFATE 3 ML: .5; 3 SOLUTION RESPIRATORY (INHALATION) at 01:12

## 2024-12-15 RX ADMIN — IPRATROPIUM BROMIDE AND ALBUTEROL SULFATE 3 ML: .5; 3 SOLUTION RESPIRATORY (INHALATION) at 07:12

## 2024-12-15 RX ADMIN — APIXABAN 5 MG: 5 TABLET, FILM COATED ORAL at 08:12

## 2024-12-15 RX ADMIN — HYDROCODONE BITARTRATE AND ACETAMINOPHEN 1 TABLET: 5; 325 TABLET ORAL at 05:12

## 2024-12-15 RX ADMIN — CEPHALEXIN 250 MG: 250 CAPSULE ORAL at 01:12

## 2024-12-15 NOTE — PROGRESS NOTES
INPATIENT NEPHROLOGY Progress Note  [facility]    Patient Name: Iris Mueller  Date: 12/15/2024    Reason for consultation: CKD IV    Chief Complaint:   Chief Complaint   Patient presents with    Headache     Fell twice today , slurred speech started at 1830       History of Present Illness:  Patient is a 69 year old female with history of HTN, presented with recurrent falls, and slurred speech. Patient was found to be in hypertensive emergency on admission. CT C spine and head was negative. MRI brain was negative for acute findings. Showed punctate focus of susceptibility artifact in the right cerebellum and left basal ganglia representing microhemorrhage or calcification. Neurology was consulted. Patient was started on aspirin and Lipitor for possible TIA. Her home Eliquis was resumed. Patient was also started on PO Doxycycline for right lower extremity cellulitis. Nephrology consulted per family request for CKD IV. Patient continue to have episodes of confusion. Dementia work up sent. Patient worked with PT, OT and recommended SNF.     Interval History:  12/9- work on BP control- then edema management  12/10  975 ml UOP recorded.  BP starting to improve--still high at times.  Na+ improved.  Renal function stable.  Sleepy, is able to converse some, though.  12/11  one shift  ml.  BP meds adjusted yesterday, BP still high.  renal function stable.  Hydralazine and lasix added today.  Pt up w/OT, feeling better.  12/12  One shift UOP recorded, 600 ml.  Still w/BP spikes, hydralazine adjusted.  Renal function stable.  Sleepy, awakens easily.   12/13 HR > 90, BP trends improved, on RA   12/14  one shift UOP recorded, 500 ml.  Pressures still high, hydralazine adjusted yest, metoprolol adjusted today.  Isordil added.  US in progress.  12/15  UOP 1L, BP started looking a bit better yesterday afternoon, only one reading today--141/81.  Renal function stable.  Renal duplex w/medical  renal dz and a degree of DANNIELLE on right.  No new complaints.    Plan of Care:    Assessment:  CKD IV  HTN emerg/TIA  Edema  Tachycardia  Hyponatremia  SHPT  Anemia of CKD  HypoMg    Plan:    - renal function at baseline- improving over last few days  - not uremic- mental status issues combo of underlying age related and vascular dementia chronically and HTN emerg causing TIA acutely  - agree with norvasc, started hydralazine 12/11, adjusted 12/12  - continue lasix MWF  - adjusted metoprolol, hydralazine.  Renal duplex done  - 2g salt, 1.5L fluid per day  - no active BMM issues  - supplement iron- DENEEN 10K today  - IV Mg today- add MgOH 400mg daily    Thank you for allowing us to participate in this patient's care. We will continue to follow.    Vital Signs:  Temp Readings from Last 3 Encounters:   12/15/24 97.9 °F (36.6 °C) (Oral)   02/12/24 97.8 °F (36.6 °C) (Oral)   01/12/24 97.6 °F (36.4 °C) (Oral)       Pulse Readings from Last 3 Encounters:   12/15/24 89   10/30/24 89   09/10/24 92       BP Readings from Last 3 Encounters:   12/15/24 (!) 141/81   10/30/24 130/80   09/10/24 132/82       Weight:  Wt Readings from Last 3 Encounters:   12/13/24 110.9 kg (244 lb 7.8 oz)   12/08/24 106 kg (233 lb 11 oz)   10/30/24 96.2 kg (212 lb)       INS/OUTS:  I/O last 3 completed shifts:  In: 240 [P.O.:240]  Out: 1550 [Urine:1550]  No intake/output data recorded.    Past Medical & Surgical History:  Past Medical History:   Diagnosis Date    Anemia, unspecified     Arthritis     Asthma     COPD (chronic obstructive pulmonary disease)     Encounter for blood transfusion     Hypertension     Obese body habitus     Wound infection 08/2019    Right knee       Past Surgical History:   Procedure Laterality Date    ANGIOGRAM, CORONARY, WITH LEFT HEART CATHETERIZATION N/A 12/23/2022    Procedure: Angiogram, Coronary, with Left Heart Cath;  Surgeon: Jorge Tran MD;  Location: [facility] CATH/EP LAB;  Service: Cardiology;  Laterality: N/A;      SECTION      ECHOCARDIOGRAM,TRANSESOPHAGEAL N/A 2023    Procedure: Transesophageal echo (MARIANO) intra-procedure log documentation;  Surgeon: Provider, Dosc Diagnostic;  Location: [facility] EP LAB;  Service: Cardiology;  Laterality: N/A;    INCISION AND DRAINAGE OF HEMATOMA Left 2024    Procedure: INCISION AND DRAINAGE, HEMATOMA;  Surgeon: Bryson Huerta MD;  Location: [facility] OR;  Service: Orthopedics;  Laterality: Left;    JOINT REPLACEMENT      Knee    KNEE ARTHROPLASTY Right 2019    Procedure: ARTHROPLASTY, KNEE;  Surgeon: Delio Chung MD;  Location: [facility] OR;  Service: Orthopedics;  Laterality: Right;  anesthesia:  general and block    REPLACEMENT OF WOUND VACUUM-ASSISTED CLOSURE DEVICE Right 2019    Procedure: REPLACEMENT, WOUND VAC;  Surgeon: Delio Chung MD;  Location: [facility] OR;  Service: Orthopedics;  Laterality: Right;    TONSILLECTOMY      TREATMENT OF CARDIAC ARRHYTHMIA N/A 2023    Procedure: Cardioversion or Defibrillation;  Surgeon: PJ Betancourt MD;  Location: [facility] EP LAB;  Service: Cardiology;  Laterality: N/A;  AF, MARIANO, DCCV, MAC, EH, 3 Prep    VENTRICULOGRAM, LEFT  2022    Procedure: Ventriculogram, Left;  Surgeon: Jorge Tran MD;  Location: [facility] CATH/EP LAB;  Service: Cardiology;;       Past Social History:  Social History     Socioeconomic History    Marital status:    Tobacco Use    Smoking status: Every Day     Current packs/day: 0.25     Average packs/day: 0.5 packs/day for 50.0 years (23.7 ttl pk-yrs)     Types: Cigarettes     Start date:      Passive exposure: Current    Smokeless tobacco: Never    Tobacco comments:     Pt states she is a 46 year smoker, smokes around 5-8 cigarettes per day. Interested in nicotine replacement while admitted. Information given on outpatient smoking cessation.   Substance and Sexual Activity    Alcohol use: Yes     Comment: RARELY    Drug use: Never    Sexual activity: Not Currently     Partners:  Male     Social Drivers of Health     Financial Resource Strain: Patient Declined (12/8/2024)    Overall Financial Resource Strain (CARDIA)     Difficulty of Paying Living Expenses: Patient declined   Food Insecurity: Patient Declined (12/8/2024)    Hunger Vital Sign     Worried About Running Out of Food in the Last Year: Patient declined     Ran Out of Food in the Last Year: Patient declined   Transportation Needs: Patient Declined (12/8/2024)    TRANSPORTATION NEEDS     Transportation : Patient declined   Physical Activity: Patient Declined (12/23/2022)    Exercise Vital Sign     Days of Exercise per Week: Patient declined     Minutes of Exercise per Session: Patient declined   Stress: Patient Declined (12/8/2024)    [employer] [place] [employer] - Occupational Stress Questionnaire     Feeling of Stress : Patient declined   Housing Stability: Patient Declined (12/8/2024)    Housing Stability Vital Sign     Unable to Pay for Housing in the Last Year: Patient declined     Homeless in the Last Year: Patient declined       Medications:  Scheduled Meds:   albuterol-ipratropium  3 mL Nebulization Q6H    amiodarone  200 mg Oral Daily    amLODIPine  10 mg Oral Daily    apixaban  5 mg Oral BID    aspirin  81 mg Oral Daily    atorvastatin  40 mg Oral QHS    cephALEXin  250 mg Oral Q6H    doxycycline  100 mg Oral Q12H    ferrous sulfate  1 tablet Oral Daily    furosemide  20 mg Oral Every Mon, Wed, Fri    hydrALAZINE  50 mg Oral Q8H    isosorbide dinitrate  20 mg Oral TID    magnesium oxide  400 mg Oral Daily    metoprolol succinate  50 mg Oral Daily    mirtazapine  15 mg Oral QHS     Continuous Infusions:  PRN Meds:.  Current Facility-Administered Medications:     acetaminophen, 650 mg, Oral, Q6H PRN    dextromethorphan-guaiFENesin  mg/5 ml, 15 mL, Oral, Q6H PRN    gabapentin, 300 mg, Oral, Nightly PRN    hydrALAZINE, 10 mg, Intravenous, Q4H PRN    HYDROcodone-acetaminophen, 1 tablet, Oral, Q6H PRN     "ondansetron, 4 mg, Intravenous, Q6H PRN  Current Facility-Administered Medications on File Prior to Encounter   Medication Dose Route Frequency Provider Last Rate Last Admin    lidocaine (PF) 10 mg/ml (1%) injection 5 mg  0.5 mL Intradermal Once Azeem Anderson MD         Current Outpatient Medications on File Prior to Encounter   Medication Sig Dispense Refill    amiodarone (PACERONE) 200 MG Tab Take 1 tablet (200 mg total) by mouth once daily. 30 tablet 11    apixaban (ELIQUIS) 5 mg Tab Take 5 mg by mouth 2 (two) times daily.      buPROPion (WELLBUTRIN SR) 150 MG TBSR 12 hr tablet Take 1 tablet (150 mg total) by mouth 2 (two) times daily. 180 tablet 3    FLUoxetine 40 MG capsule Take 1 capsule (40 mg total) by mouth once daily. 90 capsule 3    mirtazapine (REMERON) 15 MG tablet Take 1 tablet (15 mg total) by mouth every evening. For sleep 30 tablet 3    mupirocin (BACTROBAN) 2 % ointment Apply topically 2 (two) times daily. To infected skin tear 22 g 1    triamcinolone acetonide 0.1% (KENALOG) 0.1 % cream Apply topically 2 (two) times daily. 60 g 2       Allergies:  Patient has no known allergies.    Past Family History:  Reviewed; refer to Hospitalist Admission Note    Review of Systems:  Review of Systems - All 14 systems reviewed and negative, except as noted in HPI    Physical Exam:  BP (!) 141/81 (BP Location: Right arm, Patient Position: Lying)   Pulse 89   Temp 97.9 °F (36.6 °C) (Oral)   Resp 19   Ht 5' 3" (1.6 m)   Wt 110.9 kg (244 lb 7.8 oz)   SpO2 96%   BMI 43.31 kg/m²     General Appearance:    NAD, AAO x 3, cooperative, appears stated age   Head:    Normocephalic, atraumatic   Eyes:    PER, EOMI, and conjunctiva/sclera clear bilaterally        Mouth:   Moist mucus membranes, no thrush or oral lesions, normal      dentition   Back:     No CVA tenderness   Lungs:     Clear to auscultation bilaterally, no wheeze, crackles, rales      or rhonchi, symmetric air movement, respirations unlabored "   Heart:    Regular rate and rhythm, S1 and S2 normal, no murmur, rub   or gallop   Abdomen:     Soft, non-tender, non-distended, bowel sounds active all four   quadrants, no RT or guarding, no masses, no organomegaly   Extremities:   Warm and well perfused, distal pulses intact, no cyanosis or    peripheral edema   MSK:   No joint or muscle swelling, tenderness or deformity   Skin:   Skin color, texture, turgor normal, no rashes or lesions   Neurologic/Psychiatric:   CNII-XII intact, normal strength and sensation       throughout, no asterixis; normal affect, memory, judgement     and insight     Results:  Lab Results   Component Value Date     (L) 12/15/2024    K 3.9 12/15/2024     12/15/2024    CO2 27 12/15/2024    BUN 20 12/15/2024    CREATININE 1.7 (H) 12/15/2024    CALCIUM 8.4 (L) 12/15/2024    ANIONGAP 6 (L) 12/15/2024    ESTGFRAFRICA 52 (L) 04/25/2022    EGFRNONAA 45 (L) 04/25/2022       Lab Results   Component Value Date    CALCIUM 8.4 (L) 12/15/2024    PHOS 4.1 09/05/2019       Recent Labs   Lab 12/15/24  0353   WBC 6.84   RBC 2.88*   HGB 7.5*   HCT 25.9*      MCV 90   MCH 26.0*   MCHC 29.0*       I have personally reviewed pertinent radiological imaging and reports from this admission.    I have spent > 70 minutes providing care for this patient for the above diagnoses. These services have included chart/data/imaging review, evaluation, exam, formulation of plan, , note preparation, and discussions with staff involved in this patient's care.    Cristina Hahn NP    [facility] [address] [address]  [phone] (p)  [phone] (f)

## 2024-12-15 NOTE — PROGRESS NOTES
"[facility]  Progress Note    Patient Name: Iris Mueller  MRN: [id number]  Patient Class: IP- Inpatient   Admission Date: 12/7/2024  Length of Stay: 7 days  Attending Physician: Yazmin Jules MD  Primary Care Provider: Elkin You MD        Subjective     Principal Problem:AMS (altered mental status)        HPI:  The patient is a 69-year-old  female with a history of hypertension, CKD-4, atrial fibrillation (on Eliquis), morbid obesity, COPD, hyperlipidemia, CAD, aortic stenosis/regurgitation, stroke, and history of systolic/diastolic congestive heart failure.  Her last echo performed in 2022 showed, per Dr. Wright:  ·   "The estimated ejection fraction is 40%. There is a anterior apical segment that is hypo to akinetic with a wall motion abnormality  · Grade II left ventricular diastolic dysfunction. Mean left atrial pressure 20 mm Hg. Mild mitral annular calcification  · There are segmental left ventricular wall motion abnormalities.  · Normal right ventricular size with normal right ventricular systolic function.  · Moderate left atrial enlargement.  · Moderate aortic regurgitation.  · There is moderate aortic valve stenosis.  · Aortic valve area is 1.55 cm2; peak velocity is 2.88 m/s; mean gradient is 15 mmHg.  · Moderate mitral regurgitation.  · There is mild pulmonary hypertension.  · Mild tricuspid regurgitation.  · Elevated central venous pressure (15 mmHg).  · The estimated PA systolic pressure is 47 mmHg."    Please note the obtaining history from the patient is impossible, given her current mental state.      The patient lives home with her  who has visual and hearing impediment.  Thus, the majority of the history is obtained from the ER physician, records, and 2 adult children at bedside.    Her children at bedside chair concern that the patient takes her prescribed narcotics inappropriately.  The patient was brought in tonight because of " "recurrent falls.  Over the last 2-3 weeks, the patient had complained that she was "off balance" and had fallen 7-8 times.    Around the 630 p.m., family worse talking to the patient on the phone apparently and knows that she had slurred speech and that she was "stuck on the Ottoman inked".  Somehow, the patient reached the neighbor and by the time the children got to the house she was sitting in her recliner (has been moved from the dining room chair, presumably with the assistance of the neighbor).  Thus she was conducted to the ER.      While here she was afebrile but hypertensive at 224/109 and this came down to 146/108.  NIH was 1.    Workup showed that head CT for stroke was negative.  She had multiple imaging studies including bilateral hip x-rays with pelvis, chest x-ray, left humerus, left shoulder, and CT cervical spine without contrast, all of which were unrevealing.    MRI brain without contrast showed (with an addenda, per radiologist Dr. Narvaez):    "Impression:     No acute ischemic process. Moderate to advanced periventricular and deep white matter changes of chronic microvascular ischemia. GRE sequence demonstrates punctate focus of susceptibility artifact in the right cerebellum and left basal ganglia representing microhemorrhage or calcification."    Furthermore, the patient had an MRI brain without contrast which showed, per radiologist Dr. Burnett:    Impression:     "Examination is significantly limited secondary to motion with apparent absence of flow related enhancement within the left middle cerebral artery branches, left vertebral artery, and the left posterior cerebral artery.  This is most likely artifactual.     CTA may be attempted for further evaluation."    She was evaluated by vascular Neurology and my understanding is that she was not considered an intervention candidate.  Additionally, as her last known normal was not precisely known (as she is already fully anticoagulated with " Eliquis), she was not considered to be a tenecteplase candidate, either.  She is admitted for further diagnosis, treatment, and care.    Overview/Hospital Course:  Patient is a 69 year old female with history of HTN, presented with recurrent falls, and slurred speech. Patient was found to be in hypertensive emergency on admission. CT C spine and head was negative. MRI brain was negative for acute findings. Showed punctate focus of susceptibility artifact in the right cerebellum and left basal ganglia representing microhemorrhage or calcification. Neurology was consulted. Patient was started on aspirin and Lipitor for possible TIA. Her home Eliquis was resumed. Patient was also started on PO Doxycycline for right lower extremity cellulitis. Nephrology consulted per family request for CKD IV. Patient continue to have episodes of confusion. Dementia work up sent. Patient worked with PT, OT and recommended SNF.  Norvasc was increased. BP better controlled. Mental status improved. Patient discharged to [facility] for SNF. PO Doxy to continue for 10 days total. Patient was discharged but didn't leave as the facility did not take her due to BP being variable.  Skilled nursing facility did not accept the patient.  A renal artery Doppler ultrasound performed 4 resistant blood pressure management.  BP medications and antibiotics as below.     Interval History:  Patient is seen and examined during multidisciplinary rounds.  Patient noted to have right-sided renal artery stenosis.  Vascular surgery evaluation pending.  Blood pressure is is steadily improving.  Slight downward trend of hemoglobin noted.  No obvious blood loss present.  Denies any chest pain, shortness of breath and currently afebrile.      Review of Systems   All other systems reviewed and are negative.    Objective:     Vital Signs (Most Recent):  Temp: 97.9 °F (36.6 °C) (12/15/24 0436)  Pulse: 89 (12/15/24 0642)  Resp: 19 (12/15/24 0642)  BP: (!) 141/81  (12/15/24 0436)  SpO2: 96 % (12/15/24 0642) Vital Signs (24h Range):  Temp:  [97.6 °F (36.4 °C)-98 °F (36.7 °C)] 97.9 °F (36.6 °C)  Pulse:  [] 89  Resp:  [16-19] 19  SpO2:  [92 %-98 %] 96 %  BP: (126-162)/(74-98) 141/81     Weight: 110.9 kg (244 lb 7.8 oz)  Body mass index is 43.31 kg/m².    Intake/Output Summary (Last 24 hours) at 12/15/2024 0749  Last data filed at 12/15/2024 0436  Gross per 24 hour   Intake 240 ml   Output 1050 ml   Net -810 ml         Physical Exam  Constitutional:       General: She is not in acute distress.     Appearance: She is obese. She is not ill-appearing, toxic-appearing or diaphoretic.   HENT:      Head: Normocephalic and atraumatic.   Eyes:      General: No scleral icterus.        Right eye: No discharge.         Left eye: No discharge.   Neck:      Comments: No retractions; no nuchal rigidity  Cardiovascular:      Rate and Rhythm: Regular rhythm.      Heart sounds: Murmur (3/6 systolic murmur) heard.      No friction rub. No gallop.   Pulmonary:      Effort: Pulmonary effort is normal.      Breath sounds: Normal breath sounds.   Abdominal:      General: Bowel sounds are normal. There is no distension.      Palpations: Abdomen is soft. There is no mass.      Tenderness: There is no abdominal tenderness.   Musculoskeletal:      Right lower leg: Edema present improved.      Left lower leg: Edema present improved.   Skin:     General: Skin is warm and dry. Redness on both legs improved   Neurological:      Comments: no focal signs. Oriented to place and person and time.        Significant Labs: All pertinent labs within the past 24 hours have been reviewed.  CBC:   Recent Labs   Lab 12/14/24  0247 12/15/24  0353   WBC 6.94 6.84   HGB 8.3* 7.5*   HCT 28.6* 25.9*    231     CMP:   Recent Labs   Lab 12/14/24  0247 12/15/24  0353    135*   K 3.8 3.9    102   CO2 27 27   GLU 94 91   BUN 20 20   CREATININE 1.7* 1.7*   CALCIUM 8.5* 8.4*   PROT 6.0 5.5*   ALBUMIN 3.4*  3.2*   BILITOT 0.7 0.6   ALKPHOS 138* 123   AST 14 12   ALT 11 9*   ANIONGAP 8 6*     Microbiology Results (last 7 days)       Procedure Component Value Units Date/Time    Urine culture [[id number]]  (Abnormal)  (Susceptibility) Collected: 12/08/24 0530    Order Status: Completed Specimen: Urine Updated: 12/10/24 0619     Urine Culture, Routine ESCHERICHIA COLI  >100,000 cfu/ml      Narrative:      Specimen Source->Urine          Significant Imaging:   ECHO:    Left Ventricle: The left ventricle is normal in size. Moderately increased wall thickness. Unable to assess wall motion. There is normal systolic function with a visually estimated ejection fraction of 55 - 60%. Unable to assess diastolic function due to atrial fibrillation.    Right Ventricle: Right ventricle was not well visualized due to poor acoustic window.    Left Atrium: Left atrium is severely dilated.    Right Atrium: Right atrium is severely dilated.    Aortic Valve: There is moderate to severe stenosis. Aortic valve area by VTI is 1.0 cm². Aortic valve peak velocity is 3.4 m/s. Mean gradient is 27.0 mmHg. The dimensionless index is 0.32. There is mild aortic regurgitation.    IVC/SVC: Elevated venous pressure at 15 mmHg.    Renal Artery US:   1.  Small echogenic left kidney compatible with chronic medical renal disease (with no hydronephrosis or shadowing stone in either kidney).  2.  Echogenic right kidney, with elevated resistive indices compatible with intrinsic medical renal disease.  3.  Bilateral simple renal cortical cysts.  4.  Relative elevated velocity and ratio in the right kidney suggesting a degree of renal artery stenosis on the right.    CT Head:  No CT evidence of acute intracranial abnormality. If the patient has an acute, focal neurological deficit, MRI of the brain may be indicated.  Generalized cerebral volume loss and chronic microvascular ischemic changes.    B/L hip x-rays: No acute findings.     CXR: No acute findings      Left humerus x-ray:  Osteopenia. No acute fracture or dislocation. Moderate glenohumeral osteoarthritis. Nonspecific calcifications project over the soft tissues of proximal arm.     Left shoulder x-ray: Osteopenia. No acute fracture or dislocation. Moderate to advanced glenohumeral and mild AC joint osteoarthritis. Nonspecific soft tissue calcification projects over the shoulder.     CT C-spine:  No acute trauma.     MRI Brain without contrast:  No acute ischemic process. Moderate to advanced periventricular and deep white matter changes of chronic microvascular ischemia. GRE sequence demonstrates punctate focus of susceptibility artifact in the right cerebellum and left basal ganglia representing microhemorrhage or calcification.     MRA Brain:  Examination is significantly limited secondary to motion with apparent absence of flow related enhancement within the left middle cerebral artery branches, left vertebral artery, and the left posterior cerebral artery.  This is most likely artifactual.  CTA may be attempted for further evaluation.    Bilateral lower extremity venous Doppler study:   No evidence of bilateral lower extremity DVT         Assessment and Plan     * AMS (altered mental status)  Acute encephalopathy vs progressive dementia with delirium   MRI negative for acute stroke   Could be related to hypertensive encephalopathy however family states symptoms has been going on for about 2 months   normal B12, folate RPR and ammonia level   Improved on discharge   - Cont aspirin, Lipitor, Eliquis     Acute cystitis without hematuria  PO Keflex per sensitivity for 7 days       Cellulitis of right lower extremity  US DVT negative  PO Doxycycline D5, for total 10 days       Hypertensive emergency  With slurred speech and confusion     Home meds for hypertension were reviewed and noted below.   Hypertension Medications               amLODIPine (NORVASC) 2.5 MG tablet Take 1 tablet (2.5 mg total) by mouth  every evening.    metoprolol succinate (TOPROL-XL) 25 MG 24 hr tablet Take 1 tablet (25 mg total) by mouth once daily.          Norvasc increased to 10 mg  Cont metoprolol   Increase isosorbide dinitrate 20 mg p.o. t.i.d.  PO hydralazine and Lasix added     Tobacco dependency  Nicotine patch    Chronic combined systolic and diastolic heart failure  As above    Pulmonary hypertension    As above    Nonrheumatic aortic valve stenosis with regurgitation  Echo shows moderate to severe stenosis     Persistent atrial fibrillation  Resume home Amiodarone  Resume metoprolol increase to 50 mg   Per records, patient has a CHADS-VASc score of 3  On Eliquis     Morbid obesity  Body mass index is 43.31 kg/m².   Complicates all aspects of care        CKD (chronic kidney disease) stage 4, GFR 15-29 ml/min  Creatinine at baseline  Minimize nephrotoxic medications  Nephrology consulted per family request   Cr remained stable     Discussed with bedside nurse and  regarding discharge planning.  VTE Risk Mitigation (From admission, onward)           Ordered     apixaban tablet 5 mg  2 times daily         12/09/24 0748     IP VTE HIGH RISK PATIENT  Once         12/08/24 0234     Place sequential compression device  Until discontinued         12/08/24 0234                    Discharge Planning   VALERIANO: 12/17/2024     Code Status: Full Code   Medical Readiness for Discharge Date: 12/11/2024  Discharge Plan A: Home Health   Discharge Delays: None known at this time            Please place Justification for DME        Yazmin Jules MD  Department of Hospital Medicine   [facility]

## 2024-12-15 NOTE — CONSULTS
[facility]  Vascular Surgery  Consult Note    Inpatient consult to Vascular Surgery  Consult performed by: Roxanne Marin MD  Consult ordered by: Brett Deng MD      Subjective:       History of Present Illness: The patient is a 69-year-old  female with a history of hypertension, CKD-4, atrial fibrillation (on Eliquis; status post cardioversion in August 2023), morbid obesity, COPD, hyperlipidemia, CAD, aortic stenosis/regurgitation, CVA, and history of systolic/diastolic congestive heart failure (EF 50-55%) admitted to hospital on 12/8/24 for CVA vs hypertensive emergency. Patient reports she was just tired and has a lot on her mind. She has no complaints today, states they are adjusting her BP meds. She smokes 3-4 cigarettes a day if she is smoking. She does not take an antiplatelet but she is on eliquis. Vascular surgery consulted for renal duplex showing renal artery stenosis.     Medications Prior to Admission   Medication Sig Dispense Refill Last Dose/Taking    amiodarone (PACERONE) 200 MG Tab Take 1 tablet (200 mg total) by mouth once daily. 30 tablet 11 12/7/2024 Morning    apixaban (ELIQUIS) 5 mg Tab Take 5 mg by mouth 2 (two) times daily.   Past Week    buPROPion (WELLBUTRIN SR) 150 MG TBSR 12 hr tablet Take 1 tablet (150 mg total) by mouth 2 (two) times daily. 180 tablet 3 12/7/2024 Morning    FLUoxetine 40 MG capsule Take 1 capsule (40 mg total) by mouth once daily. 90 capsule 3 12/7/2024 Morning    mirtazapine (REMERON) 15 MG tablet Take 1 tablet (15 mg total) by mouth every evening. For sleep 30 tablet 3 12/6/2024    mupirocin (BACTROBAN) 2 % ointment Apply topically 2 (two) times daily. To infected skin tear 22 g 1 Past Month    triamcinolone acetonide 0.1% (KENALOG) 0.1 % cream Apply topically 2 (two) times daily. 60 g 2 Past Month    [DISCONTINUED] atorvastatin (LIPITOR) 10 MG tablet Take 1 tablet (10 mg total) by mouth every evening. 90 tablet 3 12/6/2024     [DISCONTINUED] gabapentin (NEURONTIN) 300 MG capsule Take 1 capsule (300 mg total) by mouth 3 (three) times daily. (Patient taking differently: Take 300 mg by mouth nightly as needed (Pain).) 270 capsule 3 Past Month    [DISCONTINUED] HYDROcodone-acetaminophen (NORCO) 5-325 mg per tablet Take 1 tablet by mouth every 6 (six) hours as needed for Pain. 90 tablet 0 12/7/2024 Morning    [DISCONTINUED] loratadine (CLARITIN) 10 mg tablet Take 10 mg by mouth once daily.   Past Week    [DISCONTINUED] metoprolol succinate (TOPROL-XL) 25 MG 24 hr tablet Take 1 tablet (25 mg total) by mouth once daily. 90 tablet 3 12/7/2024 Morning    [DISCONTINUED] amLODIPine (NORVASC) 2.5 MG tablet Take 1 tablet (2.5 mg total) by mouth every evening. (Patient not taking: Reported on 12/8/2024) 90 tablet 3 Not Taking    [DISCONTINUED] betamethasone dipropionate 0.05 % cream Apply topically 2 (two) times daily. (Patient not taking: Reported on 12/8/2024) 45 g 2 Not Taking    [DISCONTINUED] HYDROcodone-acetaminophen (NORCO) 5-325 mg per tablet Take 1 tablet by mouth every 6 (six) hours as needed for Pain. 90 tablet 0     [DISCONTINUED] HYDROcodone-acetaminophen (NORCO) 5-325 mg per tablet Take 1 tablet by mouth every 6 (six) hours as needed for Pain. 90 tablet 0     [DISCONTINUED] methocarbamoL (ROBAXIN) 750 MG Tab Take 1 tablet (750 mg total) by mouth nightly as needed (muscle pain/spasms). (Patient not taking: Reported on 12/8/2024) 20 tablet 1 Not Taking    [DISCONTINUED] traZODone (DESYREL) 50 MG tablet Take 2 tablets (100 mg total) by mouth nightly as needed for Insomnia. (Patient not taking: Reported on 12/8/2024) 60 tablet 2 Not Taking       Review of patient's allergies indicates:  No Known Allergies    Past Medical History:   Diagnosis Date    Anemia, unspecified     Arthritis     Asthma     COPD (chronic obstructive pulmonary disease)     Encounter for blood transfusion     Hypertension     Obese body habitus     Wound infection  2019    Right knee     Past Surgical History:   Procedure Laterality Date    ANGIOGRAM, CORONARY, WITH LEFT HEART CATHETERIZATION N/A 2022    Procedure: Angiogram, Coronary, with Left Heart Cath;  Surgeon: Jorge Tran MD;  Location: [facility] CATH/EP LAB;  Service: Cardiology;  Laterality: N/A;     SECTION      ECHOCARDIOGRAM,TRANSESOPHAGEAL N/A 2023    Procedure: Transesophageal echo (MARIANO) intra-procedure log documentation;  Surgeon: Provider, Dosc Diagnostic;  Location: [facility] EP LAB;  Service: Cardiology;  Laterality: N/A;    INCISION AND DRAINAGE OF HEMATOMA Left 2024    Procedure: INCISION AND DRAINAGE, HEMATOMA;  Surgeon: Bryson Huerta MD;  Location: [facility] OR;  Service: Orthopedics;  Laterality: Left;    JOINT REPLACEMENT      Knee    KNEE ARTHROPLASTY Right 2019    Procedure: ARTHROPLASTY, KNEE;  Surgeon: Delio Chung MD;  Location: [facility] OR;  Service: Orthopedics;  Laterality: Right;  anesthesia:  general and block    REPLACEMENT OF WOUND VACUUM-ASSISTED CLOSURE DEVICE Right 2019    Procedure: REPLACEMENT, WOUND VAC;  Surgeon: Delio Chung MD;  Location: [facility] OR;  Service: Orthopedics;  Laterality: Right;    TONSILLECTOMY      TREATMENT OF CARDIAC ARRHYTHMIA N/A 2023    Procedure: Cardioversion or Defibrillation;  Surgeon: PJ Betancourt MD;  Location: [facility] EP LAB;  Service: Cardiology;  Laterality: N/A;  AF, MARIANO, DCCV, MAC, EH, 3 Prep    VENTRICULOGRAM, LEFT  2022    Procedure: Ventriculogram, Left;  Surgeon: Jorge Tran MD;  Location: [facility] CATH/EP LAB;  Service: Cardiology;;     Family History       Problem Relation (Age of Onset)    Alzheimer's disease Mother          Tobacco Use    Smoking status: Every Day     Current packs/day: 0.25     Average packs/day: 0.5 packs/day for 50.0 years (23.7 ttl pk-yrs)     Types: Cigarettes     Start date:      Passive exposure: Current    Smokeless tobacco: Never    Tobacco comments:     Pt states  she is a 46 year smoker, smokes around 5-8 cigarettes per day. Interested in nicotine replacement while admitted. Information given on outpatient smoking cessation.   Substance and Sexual Activity    Alcohol use: Yes     Comment: RARELY    Drug use: Never    Sexual activity: Not Currently     Partners: Male     Review of Systems  Objective:     Vital Signs (Most Recent):  Temp: 98.1 °F (36.7 °C) (12/15/24 0827)  Pulse: 100 (12/15/24 0827)  Resp: 20 (12/15/24 0827)  BP: (!) 140/90 (12/15/24 0827)  SpO2: (!) 93 % (12/15/24 0827) Vital Signs (24h Range):  Temp:  [97.6 °F (36.4 °C)-98.1 °F (36.7 °C)] 98.1 °F (36.7 °C)  Pulse:  [] 100  Resp:  [16-20] 20  SpO2:  [92 %-98 %] 93 %  BP: (126-162)/(76-98) 140/90     Weight: 110.9 kg (244 lb 7.8 oz)  Body mass index is 43.31 kg/m².        Physical Exam  Constitutional:       Appearance: She is obese.   Cardiovascular:      Rate and Rhythm: Rhythm irregular.   Pulmonary:      Comments: Productive cough  Musculoskeletal:      Comments: Bilateral palpable femoral pulses   Skin:     General: Skin is warm and dry.   Neurological:      General: No focal deficit present.      Mental Status: She is alert and oriented to person, place, and time.         Significant Labs:  CMP:   Recent Labs   Lab 12/15/24  0353   GLU 91   CALCIUM 8.4*   ALBUMIN 3.2*   PROT 5.5*   *   K 3.9   CO2 27      BUN 20   CREATININE 1.7*   ALKPHOS 123   ALT 9*   AST 12   BILITOT 0.6       Significant Diagnostics:  CLINICAL HISTORY:  ([id number])70 y/o  (1955) F     Htn uncontrolled;     TECHNIQUE:  (A#[id number], exam time 12/14/2024 9:50)     US RENAL ARTERY STENOSIS [facility] [id number]     A complete ultrasound examination of the retroperitoneum and a color flow and spectral Doppler studies of the renal vasculature were performed.     COMPARISON:  None available.     FINDINGS:  Please note this examination is technically suboptimal due to patient related factors  including increased  respiratory rate/motion, inability to breath hold, body habitus as well as overlying bowel gas.     Right KIDNEY: Normal in size. Mildly increased in echotexture.  There is no evidence of renal cysts stones large enough to cause acoustic shadowing contour-deforming mass or hydronephrosis.     Renal size: 10.3 x 6.1 x 4.2 cm     Left KIDNEY: Small in size and increased in echotexture.  There is an 18 x 17 mm exophytic simple cyst in the upper pole the left kidney.  No hydronephrosis, shadowing stone or contour deforming solid renal masses identified.     Renal size: 6.1 x 3.2 x 3.2 cm     Urinary bladder: Normally distended. No stones  wall thickening  or focal mass.     Additional findings:     -the AORTA and IVC are within normal limits where visualized.     VASCULAR examination:     Abdominal aorta (PSV in the region of the main renal arteries): 45 cm/s     Right renal vascular exam: The resistive indices are elevated, in combination with the echogenic kidney compatible with intrinsic medical renal disease.  In addition there appears to be a relative elevated ratio in the mid renal artery suggesting renal artery stenosis.     Resistive index (upper, mid, lower): 1, 1, 0.9     Main renal artery PSV (origin, mid, hilar): 94 cm/s, 114 cm/s, and 61 cm/s     RAR/Ao PSV ratio: 2.5     Renal vein peak velocity: 26 cm/s     Left renal vascular exam: The resistive index in the lower pole appears elevated suggesting a component of intrinsic medical renal disease.  The peak systolic velocity (PSV), and renal artery/aortic ratio is top-normal, without convincing focal stenosis identified.     Resistive index (upper, mid, lower): 0.7, 0.6, and 1     Main renal artery PSV (origin, mid, hilar): 75 cm/s, 77 cm/s, and 79 cm/s     RAR/Ao PSV ratio: 1.8     Renal vein peak velocity: 58 cm/s     Impression:     1.  Small echogenic left kidney compatible with chronic medical renal disease (with no hydronephrosis or shadowing stone in  either kidney).     2.  Echogenic right kidney, with elevated resistive indices compatible with intrinsic medical renal disease.     3.  Bilateral simple renal cortical cysts.     4.  Relative elevated velocity and ratio in the right kidney suggesting a degree of renal artery stenosis on the right.        Electronically signed by:Jeffry Ellis  Date:                                            12/14/2024  Time:                                           09:56    Addenda     Note that the examination is limited by patient motion artifact.  Upon further review, linear focus of apparent restricted diffusion on DWI with corresponding ADC abnormality in the left cerebellum representing acute infarct versus artifact.  Additional punctate focus of apparent restricted diffusion in the right frontal lobe representing infarct versus artifact.  If clinically warranted, recommend repeat examination when patient is able to tolerate procedure.     COMMUNICATION  The key critical information above was relayed directly by Marce Narvaez by Epic secure chat (with acknowledgement) to Renaldo Rodriguez MD on 12/7/2024 at 23:14.        Electronically signed by:Marce Narvaez  Date:                                            12/07/2024  Time:                                           23:17  Signed by Marce Narvaez MD on 12/7/2024 23:19  Narrative & Impression  EXAMINATION:  MRI BRAIN WITHOUT CONTRAST     CLINICAL HISTORY:  Stroke, follow up;.     TECHNIQUE:  Multiplanar multisequence MR imaging of the brain was performed without contrast.     COMPARISON:  None     FINDINGS:  Motion artifact.     Intracranial compartment:     Ventricles and sulci are normal in size for age without evidence of hydrocephalus. No extra-axial blood or fluid collections.     No acute ischemic process.  Moderate to advanced periventricular and deep white matter changes of chronic microvascular ischemia.  GRE sequence demonstrates punctate focus  of susceptibility artifact in the right cerebellum and left basal ganglia representing microhemorrhage or calcification.     Normal vascular flow voids are preserved.     Skull/extracranial contents (limited evaluation): Bone marrow signal intensity is normal.     Impression:     No acute ischemic process. Moderate to advanced periventricular and deep white matter changes of chronic microvascular ischemia. GRE sequence demonstrates punctate focus of susceptibility artifact in the right cerebellum and left basal ganglia representing microhemorrhage or calcification.        Electronically signed by:Marce Narvaez  Date:                                            12/07/2024  Time:                                           23:06    Assessment/Plan:   69F admitted with hypertensive emergency/CVA who had renal duplex performed showing relative renal artery stenosis. The patient's renal duplex was reviewed. The right renal artery velocity of 114 cm/s and RAR of 2.5 is significantly lower than velocity of  180cm/s and RAR 3.5 that would indicate 60% stenosis. At this time there is no indication to pursue renal artery angiogram.     Active Diagnoses:    Diagnosis Date Noted POA    PRINCIPAL PROBLEM:  AMS (altered mental status) [R41.82] 12/08/2024 Yes    Acute cystitis without hematuria [N30.00] 12/12/2024 Yes    Hypertensive emergency [I16.1] 12/09/2024 Yes    Cellulitis of right lower extremity [L03.115] 12/09/2024 Yes    Tobacco dependency [F17.200] 12/08/2024 Yes    Chronic combined systolic and diastolic heart failure [I50.42] 08/18/2023 Yes    Pulmonary hypertension [I27.20] 08/18/2023 Yes    Persistent atrial fibrillation [I48.19] 06/12/2023 Yes    Nonrheumatic aortic valve stenosis with regurgitation [I35.2] 06/12/2023 Yes    Morbid obesity [E66.01] 09/19/2019 Yes    CKD (chronic kidney disease) stage 4, GFR 15-29 ml/min [N18.4] 09/13/2019 Yes      Problems Resolved During this Admission:    Diagnosis Date Noted  Date Resolved POA    CVA (cerebral vascular accident) [I63.9] 12/08/2024 12/09/2024 Yes    Nonischemic cardiomyopathy [I42.8] 06/12/2023 12/09/2024 Yes    Essential hypertension [I10] 08/14/2019 12/09/2024 Yes       Thank you for your consult. I will sign off. Please contact us if you have any additional questions.    Roxanne Marin MD  Vascular Surgery  [facility]

## 2024-12-15 NOTE — PROGRESS NOTES
[facility]  Department of Cardiology  Progress Note      PATIENT NAME: Iris Mueller  MRN: [id number]  TODAY'S DATE: 12/15/2024  ADMIT DATE: 2024                          CONSULT REQUESTED BY: Yazmin Jules MD    SUBJECTIVE     PRINCIPAL PROBLEM: AMS (altered mental status)      REASON FOR CONSULT:  uncontrolled hypertension, family request    INTERVAL HISTORY  12/15/24  HR and BP improved; No c/o today    HPI:  Patient is 69-year-old female with PMH hypertension, CKD, AFib, COPD, CAD, hyperlipidemia, CVA, heart failure, aortic stenosis, who is currently hospitalized since 2024 for altered mental status, acute cystitis, cellulitis of right lower extremity, and hypertensive emergency.  Patient tells us she has been weak, off balance, and fell 4 times in the past 1 week.  Daughter was involved in consultation on speaker phone.  She states they have tried to get patient to [facility] but they have refused admits due to high heart rate and blood pressure.        Review of patient's allergies indicates:  No Known Allergies    Past Medical History:   Diagnosis Date    Anemia, unspecified     Arthritis     Asthma     COPD (chronic obstructive pulmonary disease)     Encounter for blood transfusion     Hypertension     Obese body habitus     Wound infection 2019    Right knee     Past Surgical History:   Procedure Laterality Date    ANGIOGRAM, CORONARY, WITH LEFT HEART CATHETERIZATION N/A 2022    Procedure: Angiogram, Coronary, with Left Heart Cath;  Surgeon: Jorge Tran MD;  Location: [facility] CATH/EP LAB;  Service: Cardiology;  Laterality: N/A;     SECTION      ECHOCARDIOGRAM,TRANSESOPHAGEAL N/A 2023    Procedure: Transesophageal echo (MARIANO) intra-procedure log documentation;  Surgeon: Araceli Sue Diagnostic;  Location: [facility] EP LAB;  Service: Cardiology;  Laterality: N/A;    INCISION AND DRAINAGE OF HEMATOMA Left 2024    Procedure: INCISION AND DRAINAGE,  HEMATOMA;  Surgeon: Bryson Huerta MD;  Location: [facility] OR;  Service: Orthopedics;  Laterality: Left;    JOINT REPLACEMENT      Knee    KNEE ARTHROPLASTY Right 8/14/2019    Procedure: ARTHROPLASTY, KNEE;  Surgeon: Delio Chung MD;  Location: [facility] OR;  Service: Orthopedics;  Laterality: Right;  anesthesia:  general and block    REPLACEMENT OF WOUND VACUUM-ASSISTED CLOSURE DEVICE Right 9/12/2019    Procedure: REPLACEMENT, WOUND VAC;  Surgeon: Delio Chung MD;  Location: [facility] OR;  Service: Orthopedics;  Laterality: Right;    TONSILLECTOMY      TREATMENT OF CARDIAC ARRHYTHMIA N/A 8/31/2023    Procedure: Cardioversion or Defibrillation;  Surgeon: PJ Betancourt MD;  Location: [facility] EP LAB;  Service: Cardiology;  Laterality: N/A;  AF, MARIANO, DCCV, MAC, EH, 3 Prep    VENTRICULOGRAM, LEFT  12/23/2022    Procedure: Ventriculogram, Left;  Surgeon: Jorge Tran MD;  Location: [facility] CATH/EP LAB;  Service: Cardiology;;     Social History     Tobacco Use    Smoking status: Every Day     Current packs/day: 0.25     Average packs/day: 0.5 packs/day for 50.0 years (23.7 ttl pk-yrs)     Types: Cigarettes     Start date: 1975     Passive exposure: Current    Smokeless tobacco: Never    Tobacco comments:     Pt states she is a 46 year smoker, smokes around 5-8 cigarettes per day. Interested in nicotine replacement while admitted. Information given on outpatient smoking cessation.   Substance Use Topics    Alcohol use: Yes     Comment: RARELY    Drug use: Never        REVIEW OF SYSTEMS  Negative except as mentioned in HPI    OBJECTIVE     VITAL SIGNS (Most Recent)  Temp: 98.1 °F (36.7 °C) (12/15/24 0827)  Pulse: 80 (12/15/24 0945)  Resp: 20 (12/15/24 0827)  BP: (!) 140/90 (12/15/24 0827)  SpO2: (!) 93 % (12/15/24 0827)    VENTILATION STATUS  Resp: 20 (12/15/24 0827)  SpO2: (!) 93 % (12/15/24 0827)           I & O (Last 24H):  Intake/Output Summary (Last 24 hours) at 12/15/2024 1106  Last data filed at 12/15/2024  0945  Gross per 24 hour   Intake --   Output 1150 ml   Net -1150 ml       WEIGHTS  Wt Readings from Last 3 Encounters:   12/13/24 0352 110.9 kg (244 lb 7.8 oz)   12/12/24 0510 108.7 kg (239 lb 10.2 oz)   12/08/24 0500 106 kg (233 lb 10.2 oz)   12/07/24 1944 99.8 kg (220 lb)   12/08/24 1335 106 kg (233 lb 11 oz)   10/30/24 1057 96.2 kg (212 lb)       PHYSICAL EXAM    GENERAL:  Elderly female resting in bed, breathing comfortably in no apparent distress.  HEENT: Normocephalic.    NECK: No JVD.   CARDIAC: IRRegular rate and rhythm.; + systolic murmur  CHEST ANATOMY: bruising  LUNGS:  Moist cough, no dyspnea, crackles to bases  ABDOMEN: Soft, obese .  Normal bowel sounds.    EXTREMITIES:  1-2+ edema to lower extremities,   CENTRAL NERVOUS SYSTEM: AAO x 3  SKIN:   bruising bilateral arms;     HOME MEDICATIONS:  Current Facility-Administered Medications on File Prior to Encounter   Medication Dose Route Frequency Provider Last Rate Last Admin    lidocaine (PF) 10 mg/ml (1%) injection 5 mg  0.5 mL Intradermal Once Azeem Anderson MD         Current Outpatient Medications on File Prior to Encounter   Medication Sig Dispense Refill    amiodarone (PACERONE) 200 MG Tab Take 1 tablet (200 mg total) by mouth once daily. 30 tablet 11    apixaban (ELIQUIS) 5 mg Tab Take 5 mg by mouth 2 (two) times daily.      buPROPion (WELLBUTRIN SR) 150 MG TBSR 12 hr tablet Take 1 tablet (150 mg total) by mouth 2 (two) times daily. 180 tablet 3    FLUoxetine 40 MG capsule Take 1 capsule (40 mg total) by mouth once daily. 90 capsule 3    mirtazapine (REMERON) 15 MG tablet Take 1 tablet (15 mg total) by mouth every evening. For sleep 30 tablet 3    mupirocin (BACTROBAN) 2 % ointment Apply topically 2 (two) times daily. To infected skin tear 22 g 1    triamcinolone acetonide 0.1% (KENALOG) 0.1 % cream Apply topically 2 (two) times daily. 60 g 2       SCHEDULED MEDS:   albuterol-ipratropium  3 mL Nebulization Q6H    amiodarone  200 mg Oral Daily  "   amLODIPine  10 mg Oral Daily    apixaban  5 mg Oral BID    aspirin  81 mg Oral Daily    atorvastatin  40 mg Oral QHS    cephALEXin  250 mg Oral Q6H    doxycycline  100 mg Oral Q12H    ferrous sulfate  1 tablet Oral Daily    furosemide  20 mg Oral Every Mon, Wed, Fri    hydrALAZINE  50 mg Oral Q8H    isosorbide dinitrate  20 mg Oral TID    magnesium oxide  400 mg Oral Daily    metoprolol succinate  50 mg Oral BID    mirtazapine  15 mg Oral QHS       CONTINUOUS INFUSIONS:    PRN MEDS:  Current Facility-Administered Medications:     acetaminophen, 650 mg, Oral, Q6H PRN    dextromethorphan-guaiFENesin  mg/5 ml, 15 mL, Oral, Q6H PRN    gabapentin, 300 mg, Oral, Nightly PRN    hydrALAZINE, 10 mg, Intravenous, Q4H PRN    HYDROcodone-acetaminophen, 1 tablet, Oral, Q6H PRN    ondansetron, 4 mg, Intravenous, Q6H PRN    LABS AND DIAGNOSTICS     CBC LAST 3 DAYS  Recent Labs   Lab 12/13/24  0437 12/14/24  0247 12/15/24  0353   WBC 6.52 6.94 6.84   RBC 3.10* 3.18* 2.88*   HGB 8.2* 8.3* 7.5*   HCT 28.1* 28.6* 25.9*   MCV 91 90 90   MCH 26.5* 26.1* 26.0*   MCHC 29.2* 29.0* 29.0*   RDW 19.0* 19.0* 19.3*    246 231   MPV 9.0* 9.0* 9.1*   GRAN 77.7*  5.1 78.0*  5.4 77.0*  5.3   LYMPH 9.7*  0.6* 9.9*  0.7* 10.2*  0.7*   MONO 8.1  0.5 7.1  0.5 8.5  0.6   BASO 0.03 0.03 0.02   NRBC 0 0 0       COAGULATION LAST 3 DAYS  No results for input(s): "LABPT", "INR", "APTT" in the last 168 hours.      CHEMISTRY LAST 3 DAYS  Recent Labs   Lab 12/13/24 0437 12/14/24 0247 12/15/24  0353    137 135*   K 3.8 3.8 3.9    102 102   CO2 26 27 27   ANIONGAP 7* 8 6*   BUN 21 20 20   CREATININE 1.7* 1.7* 1.7*    94 91   CALCIUM 8.3* 8.5* 8.4*   MG 1.4* 1.8 1.8   ALBUMIN 3.3* 3.4* 3.2*   PROT 5.8* 6.0 5.5*   ALKPHOS 132 138* 123   ALT 10 11 9*   AST 11 14 12   BILITOT 0.7 0.7 0.6       CARDIAC PROFILE LAST 3 DAYS  No results for input(s): "BNP", "CPK", "CPKMB", "LDH", "TROPONINI" in the last 168 " "hours.    ENDOCRINE LAST 3 DAYS  No results for input(s): "TSH", "PROCAL" in the last 168 hours.      LAST ARTERIAL BLOOD GAS  ABG  No results for input(s): "PH", "PO2", "PCO2", "HCO3", "BE" in the last 168 hours.    LAST 7 DAYS MICROBIOLOGY   Microbiology Results (last 7 days)       Procedure Component Value Units Date/Time    Urine culture [[id number]]  (Abnormal)  (Susceptibility) Collected: 12/08/24 0530    Order Status: Completed Specimen: Urine Updated: 12/10/24 0619     Urine Culture, Routine ESCHERICHIA COLI  >100,000 cfu/ml      Narrative:      Specimen Source->Urine            MOST RECENT IMAGING  US Renal Artery Stenosis [employer]  Narrative: CLINICAL HISTORY:  ([id number])70 y/o  (1955) F    Htn uncontrolled;    TECHNIQUE:  (A#[id number], exam time 12/14/2024 9:50)    US RENAL ARTERY STENOSIS [employer] [id number]    A complete ultrasound examination of the retroperitoneum and a color flow and spectral Doppler studies of the renal vasculature were performed.    COMPARISON:  None available.    FINDINGS:  Please note this examination is technically suboptimal due to patient related factors  including increased respiratory rate/motion, inability to breath hold, body habitus as well as overlying bowel gas.    Right KIDNEY: Normal in size. Mildly increased in echotexture.  There is no evidence of renal cysts stones large enough to cause acoustic shadowing contour-deforming mass or hydronephrosis.    Renal size: 10.3 x 6.1 x 4.2 cm    Left KIDNEY: Small in size and increased in echotexture.  There is an 18 x 17 mm exophytic simple cyst in the upper pole the left kidney.  No hydronephrosis, shadowing stone or contour deforming solid renal masses identified.    Renal size: 6.1 x 3.2 x 3.2 cm    Urinary bladder: Normally distended. No stones  wall thickening  or focal mass.    Additional findings:    -the AORTA and IVC are within normal limits where visualized.    VASCULAR examination:    Abdominal aorta " (PSV in the region of the main renal arteries): 45 cm/s    Right renal vascular exam: The resistive indices are elevated, in combination with the echogenic kidney compatible with intrinsic medical renal disease.  In addition there appears to be a relative elevated ratio in the mid renal artery suggesting renal artery stenosis.    Resistive index (upper, mid, lower): 1, 1, 0.9    Main renal artery PSV (origin, mid, hilar): 94 cm/s, 114 cm/s, and 61 cm/s    RAR/Ao PSV ratio: 2.5    Renal vein peak velocity: 26 cm/s    Left renal vascular exam: The resistive index in the lower pole appears elevated suggesting a component of intrinsic medical renal disease.  The peak systolic velocity (PSV), and renal artery/aortic ratio is top-normal, without convincing focal stenosis identified.    Resistive index (upper, mid, lower): 0.7, 0.6, and 1    Main renal artery PSV (origin, mid, hilar): 75 cm/s, 77 cm/s, and 79 cm/s    RAR/Ao PSV ratio: 1.8    Renal vein peak velocity: 58 cm/s  Impression: 1.  Small echogenic left kidney compatible with chronic medical renal disease (with no hydronephrosis or shadowing stone in either kidney).    2.  Echogenic right kidney, with elevated resistive indices compatible with intrinsic medical renal disease.    3.  Bilateral simple renal cortical cysts.    4.  Relative elevated velocity and ratio in the right kidney suggesting a degree of renal artery stenosis on the right.    Electronically signed by: Jeffry Ellis  Date:    12/14/2024  Time:    09:56      ECHOCARDIOGRAM RESULTS (last 5)  Results for orders placed during the hospital encounter of 12/07/24    Echo Saline Bubble? No    Interpretation Summary    Left Ventricle: The left ventricle is normal in size. Moderately increased wall thickness. Unable to assess wall motion. There is normal systolic function with a visually estimated ejection fraction of 55 - 60%. Unable to assess diastolic function due to atrial fibrillation.    Right  Ventricle: Right ventricle was not well visualized due to poor acoustic window.    Left Atrium: Left atrium is severely dilated.    Right Atrium: Right atrium is severely dilated.    Aortic Valve: There is moderate to severe stenosis. Aortic valve area by VTI is 1.0 cm². Aortic valve peak velocity is 3.4 m/s. Mean gradient is 27.0 mmHg. The dimensionless index is 0.32. There is mild aortic regurgitation.    IVC/SVC: Elevated venous pressure at 15 mmHg.      Results for orders placed during the hospital encounter of 08/31/23    Transesophageal echo (MARIANO)    Interpretation Summary    MARIANO to evaluate the LA/WENCESLAO prior to DCCV.    Left Ventricle: The left ventricle is normal in size. Normal wall thickness. Normal wall motion. There is low normal systolic function with a visually estimated ejection fraction of 50 - 55%.    Left Atrium: Left atrium is dilated. The left atrial appendage appears normal. Appendage velocity is reduced at less than 40 cm/sec. There is no thrombus in the cavity or appendage. Light spontaneous echo contrast visualized in the left atrial cavity and appendage.    Right Ventricle: Normal right ventricular cavity size. Wall thickness is normal. Right ventricle wall motion is ormal. Systolic function is normal.    Right Atrium: Right atrium is dilated.    Aortic Valve: The aortic valve is a trileaflet valve. There is moderate aortic valve sclerosis. Mildly restricted motion. Aortic valve stenosis is present, but unable to grade severity of AS. There is mild aortic regurgitation.    Mitral Valve: Mild mitral annular calcification. There is mild regurgitation.    Tricuspid Valve: There is mild regurgitation.    Aorta: Grade 2 atherosclerosis of the descending aorta.      Results for orders placed during the hospital encounter of 12/22/22    Echo    Interpretation Summary  · The estimated ejection fraction is 40%. There is a anterior apical segment that is hypo to akinetic with a wall motion  abnormality  · Grade II left ventricular diastolic dysfunction. Mean left atrial pressure 20 mm Hg. Mild mitral annular calcification  · There are segmental left ventricular wall motion abnormalities.  · Normal right ventricular size with normal right ventricular systolic function.  · Moderate left atrial enlargement.  · Moderate aortic regurgitation.  · There is moderate aortic valve stenosis.  · Aortic valve area is 1.55 cm2; peak velocity is 2.88 m/s; mean gradient is 15 mmHg.  · Moderate mitral regurgitation.  · There is mild pulmonary hypertension.  · Mild tricuspid regurgitation.  · Elevated central venous pressure (15 mmHg).  · The estimated PA systolic pressure is 47 mmHg.      Results for orders placed during the hospital encounter of 09/12/19    Echo Color Flow Doppler? Yes    Interpretation Summary  · Concentric left ventricular remodeling.  · Normal left ventricular systolic function. The estimated ejection fraction is 60%  · Normal LV diastolic function.  · Mild aortic regurgitation.  · Elevated central venous pressure (15 mm Hg).  · The estimated PA systolic pressure is 32 mm Hg      CURRENT/PREVIOUS VISIT EKG  Results for orders placed or performed during the hospital encounter of 12/07/24   ECG 12 lead    Collection Time: 12/07/24  8:08 PM   Result Value Ref Range    QRS Duration 112 ms    OHS QTC Calculation 490 ms    Narrative    Test Reason : R29.818,    Vent. Rate :  85 BPM     Atrial Rate :    BPM     P-R Int :    ms          QRS Dur : 112 ms      QT Int : 412 ms       P-R-T Axes :     91  30 degrees    QTcB Int : 490 ms    Atrial fibrillation  Rightward axis  Nonspecific ST abnormality  Prolonged QT  Abnormal ECG  No previous ECGs available  Confirmed by Oli Lara ([id number]) on 12/8/2024 9:35:02 AM    Referred By: AAAREFERRAL SELF           Confirmed By: Oli Lara           ASSESSMENT/PLAN:     Active Hospital Problems    Diagnosis    *AMS (altered mental status)    Acute cystitis without  hematuria    Hypertensive emergency    Cellulitis of right lower extremity    Tobacco dependency    Chronic combined systolic and diastolic heart failure    Pulmonary hypertension    Persistent atrial fibrillation    Nonrheumatic aortic valve stenosis with regurgitation    Morbid obesity    CKD (chronic kidney disease) stage 4, GFR 15-29 ml/min       ASSESSMENT & PLAN:   Hypertension  Rt renal artery stenosis  Altered mental status  Aortic valve stenosis  Persistent atrial fibrillation  CKD  Cellulitis  Anemia    RECOMMENDATIONS:  Echocardiogram : normal EF, LA/RA sev dil, + moderate to severe AS  She will need outpatient follow-up for aortic valve monitoring  Chronic atrial fibrillation, rate controlled  Continue on anticoagulation with Eliquis 5 mg b.i.d. for CVA prophylaxis  Increased Toprol 50 mg to BID  1 x Lasix 20 mg IV today (crackles and peripheral edema present)  Recommend upon discharge, pt goes to outpatient rehab facility given frequent falls      Grteta Camacho NP  [facility]  Department of Cardiology  Date of Service: 12/15/2024        I have personally interviewed and examined the patient, I have reviewed the Nurse Practitioner's history and physical, assessment, and plan. I agree with the findings and plan.      Dr. Jinny M.D.  [facility]  Department of Cardiology  Date of Service: 12/15/2024  4:13 PM

## 2024-12-15 NOTE — SUBJECTIVE & OBJECTIVE
Interval History:  Patient is seen and examined during multidisciplinary rounds.  Patient noted to have right-sided renal artery stenosis.  Vascular surgery evaluation pending.  Blood pressure is is steadily improving.  Slight downward trend of hemoglobin noted.  No obvious blood loss present.  Denies any chest pain, shortness of breath and currently afebrile.      Review of Systems   All other systems reviewed and are negative.    Objective:     Vital Signs (Most Recent):  Temp: 97.9 °F (36.6 °C) (12/15/24 0436)  Pulse: 89 (12/15/24 0642)  Resp: 19 (12/15/24 0642)  BP: (!) 141/81 (12/15/24 0436)  SpO2: 96 % (12/15/24 0642) Vital Signs (24h Range):  Temp:  [97.6 °F (36.4 °C)-98 °F (36.7 °C)] 97.9 °F (36.6 °C)  Pulse:  [] 89  Resp:  [16-19] 19  SpO2:  [92 %-98 %] 96 %  BP: (126-162)/(74-98) 141/81     Weight: 110.9 kg (244 lb 7.8 oz)  Body mass index is 43.31 kg/m².    Intake/Output Summary (Last 24 hours) at 12/15/2024 0749  Last data filed at 12/15/2024 0436  Gross per 24 hour   Intake 240 ml   Output 1050 ml   Net -810 ml         Physical Exam  Constitutional:       General: She is not in acute distress.     Appearance: She is obese. She is not ill-appearing, toxic-appearing or diaphoretic.   HENT:      Head: Normocephalic and atraumatic.   Eyes:      General: No scleral icterus.        Right eye: No discharge.         Left eye: No discharge.   Neck:      Comments: No retractions; no nuchal rigidity  Cardiovascular:      Rate and Rhythm: Regular rhythm.      Heart sounds: Murmur (3/6 systolic murmur) heard.      No friction rub. No gallop.   Pulmonary:      Effort: Pulmonary effort is normal.      Breath sounds: Normal breath sounds.   Abdominal:      General: Bowel sounds are normal. There is no distension.      Palpations: Abdomen is soft. There is no mass.      Tenderness: There is no abdominal tenderness.   Musculoskeletal:      Right lower leg: Edema present improved.      Left lower leg: Edema present  improved.   Skin:     General: Skin is warm and dry. Redness on both legs improved   Neurological:      Comments: no focal signs. Oriented to place and person and time.        Significant Labs: All pertinent labs within the past 24 hours have been reviewed.  CBC:   Recent Labs   Lab 12/14/24  0247 12/15/24  0353   WBC 6.94 6.84   HGB 8.3* 7.5*   HCT 28.6* 25.9*    231     CMP:   Recent Labs   Lab 12/14/24  0247 12/15/24  0353    135*   K 3.8 3.9    102   CO2 27 27   GLU 94 91   BUN 20 20   CREATININE 1.7* 1.7*   CALCIUM 8.5* 8.4*   PROT 6.0 5.5*   ALBUMIN 3.4* 3.2*   BILITOT 0.7 0.6   ALKPHOS 138* 123   AST 14 12   ALT 11 9*   ANIONGAP 8 6*     Microbiology Results (last 7 days)       Procedure Component Value Units Date/Time    Urine culture [[id number]]  (Abnormal)  (Susceptibility) Collected: 12/08/24 0530    Order Status: Completed Specimen: Urine Updated: 12/10/24 0619     Urine Culture, Routine ESCHERICHIA COLI  >100,000 cfu/ml      Narrative:      Specimen Source->Urine          Significant Imaging:   ECHO:    Left Ventricle: The left ventricle is normal in size. Moderately increased wall thickness. Unable to assess wall motion. There is normal systolic function with a visually estimated ejection fraction of 55 - 60%. Unable to assess diastolic function due to atrial fibrillation.    Right Ventricle: Right ventricle was not well visualized due to poor acoustic window.    Left Atrium: Left atrium is severely dilated.    Right Atrium: Right atrium is severely dilated.    Aortic Valve: There is moderate to severe stenosis. Aortic valve area by VTI is 1.0 cm². Aortic valve peak velocity is 3.4 m/s. Mean gradient is 27.0 mmHg. The dimensionless index is 0.32. There is mild aortic regurgitation.    IVC/SVC: Elevated venous pressure at 15 mmHg.    Renal Artery US:   1.  Small echogenic left kidney compatible with chronic medical renal disease (with no hydronephrosis or shadowing stone in either  kidney).  2.  Echogenic right kidney, with elevated resistive indices compatible with intrinsic medical renal disease.  3.  Bilateral simple renal cortical cysts.  4.  Relative elevated velocity and ratio in the right kidney suggesting a degree of renal artery stenosis on the right.    CT Head:  No CT evidence of acute intracranial abnormality. If the patient has an acute, focal neurological deficit, MRI of the brain may be indicated.  Generalized cerebral volume loss and chronic microvascular ischemic changes.    B/L hip x-rays: No acute findings.     CXR: No acute findings     Left humerus x-ray:  Osteopenia. No acute fracture or dislocation. Moderate glenohumeral osteoarthritis. Nonspecific calcifications project over the soft tissues of proximal arm.     Left shoulder x-ray: Osteopenia. No acute fracture or dislocation. Moderate to advanced glenohumeral and mild AC joint osteoarthritis. Nonspecific soft tissue calcification projects over the shoulder.     CT C-spine:  No acute trauma.     MRI Brain without contrast:  No acute ischemic process. Moderate to advanced periventricular and deep white matter changes of chronic microvascular ischemia. GRE sequence demonstrates punctate focus of susceptibility artifact in the right cerebellum and left basal ganglia representing microhemorrhage or calcification.     MRA Brain:  Examination is significantly limited secondary to motion with apparent absence of flow related enhancement within the left middle cerebral artery branches, left vertebral artery, and the left posterior cerebral artery.  This is most likely artifactual.  CTA may be attempted for further evaluation.    Bilateral lower extremity venous Doppler study:   No evidence of bilateral lower extremity DVT

## 2024-12-15 NOTE — PLAN OF CARE
Problem: Adult Inpatient Plan of Care  Goal: Absence of Hospital-Acquired Illness or Injury  Outcome: Progressing  Goal: Optimal Comfort and Wellbeing  Outcome: Progressing     Problem: Fall Injury Risk  Goal: Absence of Fall and Fall-Related Injury  Outcome: Progressing     Problem: Infection  Goal: Absence of Infection Signs and Symptoms  Outcome: Progressing     Problem: Wound  Goal: Optimal Functional Ability  Outcome: Progressing  Goal: Optimal Pain Control and Function  Outcome: Progressing  Goal: Optimal Wound Healing  Outcome: Progressing

## 2024-12-15 NOTE — CARE UPDATE
12/15/24 0642   Patient Assessment/Suction   Level of Consciousness (AVPU) alert   Respiratory Effort Normal;Unlabored   Expansion/Accessory Muscles/Retractions no use of accessory muscles;no retractions;expansion symmetric   All Lung Fields Breath Sounds diminished   Rhythm/Pattern, Respiratory unlabored;pattern regular;depth regular;chest wiggle adequate;no shortness of breath reported   Cough Frequency no cough   PRE-TX-O2   Device (Oxygen Therapy) nasal cannula with humidification   $ Is the patient on Low Flow Oxygen? Yes   Flow (L/min) (Oxygen Therapy) 1   SpO2 96 %   Pulse Oximetry Type Intermittent   $ Pulse Oximetry - Multiple Charge Pulse Oximetry - Multiple   Pulse 89   Resp 19   Aerosol Therapy   $ Aerosol Therapy Charges Aerosol Treatment   Daily Review of Necessity (SVN) completed   Respiratory Treatment Status (SVN) given   Treatment Route (SVN) oxygen;mask   Patient Position HOB elevated   Post Treatment Assessment (SVN) increased aeration   Signs of Intolerance (SVN) none   Breath Sounds Post-Respiratory Treatment   Throughout All Fields Post-Treatment All Fields   Throughout All Fields Post-Treatment aeration increased   Post-treatment Heart Rate (beats/min) 89   Post-treatment Resp Rate (breaths/min) 19   Education   $ Education Bronchodilator;15 min

## 2024-12-15 NOTE — CARE UPDATE
12/14/24 1959   Patient Assessment/Suction   Level of Consciousness (AVPU) alert   Respiratory Effort Normal   Expansion/Accessory Muscles/Retractions no use of accessory muscles   All Lung Fields Breath Sounds diminished   Rhythm/Pattern, Respiratory unlabored   Cough Frequency infrequent   Cough Type nonproductive   Skin Integrity   $ Wound Care Tech Time 15 min   Area Observed Left;Right;Behind ear;Cheek;Nares   Skin Appearance without discoloration   PRE-TX-O2   Device (Oxygen Therapy) nasal cannula with humidification   Flow (L/min) (Oxygen Therapy) 2   SpO2 (!) 92 %   Pulse Oximetry Type Intermittent   $ Pulse Oximetry - Multiple Charge Pulse Oximetry - Multiple   Pulse 97   Resp 19   Aerosol Therapy   $ Aerosol Therapy Charges Aerosol Treatment   Daily Review of Necessity (SVN) completed   Respiratory Treatment Status (SVN) given   Treatment Route (SVN) mask;oxygen   Patient Position HOB elevated   Post Treatment Assessment (SVN) breath sounds unchanged;vital signs unchanged   Signs of Intolerance (SVN) none   Vibratory PEP Therapy   $ Vibratory PEP Charges Aerobika Therapy   $ Vibratory PEP Equipment Aerobika Equipment   $ Vibratory PEP Tech Time Charges 15 min   Daily Review of Necessity (PEP Therapy) completed   Type (PEP Therapy) vibratory/oscillatory   Device (PEP Therapy) flutter   Route (PEP Therapy) instruction provided, follow-up   Breaths per Cycle (PEP Therapy) 10   Cycles (PEP Therapy) 1   Settings (PEP Therapy) PEP 5   Patient Position (PEP Therapy) HOB elevated   Post Treatment Assessment (PEP) breath sounds unchanged;vital signs unchanged   Signs of Intolerance (PEP Therapy) none   Education   $ Education Bronchodilator;PEP Therapy;15 min   Respiratory Evaluation   $ Care Plan Tech Time 15 min

## 2024-12-16 LAB
ALBUMIN SERPL BCP-MCNC: 3.1 G/DL (ref 3.5–5.2)
ALP SERPL-CCNC: 117 U/L (ref 55–135)
ALT SERPL W/O P-5'-P-CCNC: 11 U/L (ref 10–44)
ANION GAP SERPL CALC-SCNC: 7 MMOL/L (ref 8–16)
AST SERPL-CCNC: 14 U/L (ref 10–40)
BASOPHILS # BLD AUTO: 0.03 K/UL (ref 0–0.2)
BASOPHILS NFR BLD: 0.4 % (ref 0–1.9)
BILIRUB SERPL-MCNC: 0.6 MG/DL (ref 0.1–1)
BUN SERPL-MCNC: 22 MG/DL (ref 8–23)
CALCIUM SERPL-MCNC: 8.5 MG/DL (ref 8.7–10.5)
CHLORIDE SERPL-SCNC: 101 MMOL/L (ref 95–110)
CO2 SERPL-SCNC: 27 MMOL/L (ref 23–29)
CREAT SERPL-MCNC: 1.7 MG/DL (ref 0.5–1.4)
DIFFERENTIAL METHOD BLD: ABNORMAL
EOSINOPHIL # BLD AUTO: 0.3 K/UL (ref 0–0.5)
EOSINOPHIL NFR BLD: 4.1 % (ref 0–8)
ERYTHROCYTE [DISTWIDTH] IN BLOOD BY AUTOMATED COUNT: 19.6 % (ref 11.5–14.5)
EST. GFR  (NO RACE VARIABLE): 31.9 ML/MIN/1.73 M^2
GLUCOSE SERPL-MCNC: 93 MG/DL (ref 70–110)
HCT VFR BLD AUTO: 25.5 % (ref 37–48.5)
HGB BLD-MCNC: 7.5 G/DL (ref 12–16)
IMM GRANULOCYTES # BLD AUTO: 0.03 K/UL (ref 0–0.04)
IMM GRANULOCYTES NFR BLD AUTO: 0.4 % (ref 0–0.5)
LYMPHOCYTES # BLD AUTO: 0.8 K/UL (ref 1–4.8)
LYMPHOCYTES NFR BLD: 11.4 % (ref 18–48)
MAGNESIUM SERPL-MCNC: 1.8 MG/DL (ref 1.6–2.6)
MCH RBC QN AUTO: 26.4 PG (ref 27–31)
MCHC RBC AUTO-ENTMCNC: 29.4 G/DL (ref 32–36)
MCV RBC AUTO: 90 FL (ref 82–98)
MONOCYTES # BLD AUTO: 0.7 K/UL (ref 0.3–1)
MONOCYTES NFR BLD: 9.8 % (ref 4–15)
NEUTROPHILS # BLD AUTO: 5.1 K/UL (ref 1.8–7.7)
NEUTROPHILS NFR BLD: 73.9 % (ref 38–73)
NRBC BLD-RTO: 0 /100 WBC
PLATELET # BLD AUTO: 233 K/UL (ref 150–450)
PMV BLD AUTO: 9 FL (ref 9.2–12.9)
POTASSIUM SERPL-SCNC: 3.7 MMOL/L (ref 3.5–5.1)
PROT SERPL-MCNC: 5.5 G/DL (ref 6–8.4)
RBC # BLD AUTO: 2.84 M/UL (ref 4–5.4)
SODIUM SERPL-SCNC: 135 MMOL/L (ref 136–145)
WBC # BLD AUTO: 6.86 K/UL (ref 3.9–12.7)

## 2024-12-16 PROCEDURE — 21400001 HC TELEMETRY ROOM

## 2024-12-16 PROCEDURE — 25000003 PHARM REV CODE 250: Performed by: INTERNAL MEDICINE

## 2024-12-16 PROCEDURE — 80053 COMPREHEN METABOLIC PANEL: CPT | Performed by: INTERNAL MEDICINE

## 2024-12-16 PROCEDURE — 99900031 HC PATIENT EDUCATION (STAT)

## 2024-12-16 PROCEDURE — 25000003 PHARM REV CODE 250: Performed by: NURSE PRACTITIONER

## 2024-12-16 PROCEDURE — 97535 SELF CARE MNGMENT TRAINING: CPT

## 2024-12-16 PROCEDURE — 83735 ASSAY OF MAGNESIUM: CPT | Performed by: INTERNAL MEDICINE

## 2024-12-16 PROCEDURE — 25000242 PHARM REV CODE 250 ALT 637 W/ HCPCS: Performed by: INTERNAL MEDICINE

## 2024-12-16 PROCEDURE — 99233 SBSQ HOSP IP/OBS HIGH 50: CPT | Mod: ,,, | Performed by: INTERNAL MEDICINE

## 2024-12-16 PROCEDURE — 36415 COLL VENOUS BLD VENIPUNCTURE: CPT | Performed by: INTERNAL MEDICINE

## 2024-12-16 PROCEDURE — 94761 N-INVAS EAR/PLS OXIMETRY MLT: CPT

## 2024-12-16 PROCEDURE — 85025 COMPLETE CBC W/AUTO DIFF WBC: CPT | Performed by: INTERNAL MEDICINE

## 2024-12-16 PROCEDURE — 94640 AIRWAY INHALATION TREATMENT: CPT

## 2024-12-16 RX ORDER — LANOLIN ALCOHOL/MO/W.PET/CERES
400 CREAM (GRAM) TOPICAL DAILY
Qty: 30 TABLET | Refills: 0 | Status: SHIPPED | OUTPATIENT
Start: 2024-12-16

## 2024-12-16 RX ORDER — SODIUM,POTASSIUM PHOSPHATES 280-250MG
2 POWDER IN PACKET (EA) ORAL
Status: DISCONTINUED | OUTPATIENT
Start: 2024-12-16 | End: 2024-12-17 | Stop reason: HOSPADM

## 2024-12-16 RX ORDER — METOPROLOL SUCCINATE 50 MG/1
50 TABLET, EXTENDED RELEASE ORAL 2 TIMES DAILY
Qty: 60 TABLET | Refills: 0 | Status: SHIPPED | OUTPATIENT
Start: 2024-12-16 | End: 2025-12-16

## 2024-12-16 RX ORDER — LANOLIN ALCOHOL/MO/W.PET/CERES
800 CREAM (GRAM) TOPICAL
Status: DISCONTINUED | OUTPATIENT
Start: 2024-12-16 | End: 2024-12-17 | Stop reason: HOSPADM

## 2024-12-16 RX ORDER — ISOSORBIDE DINITRATE 20 MG/1
20 TABLET ORAL 3 TIMES DAILY
Qty: 90 TABLET | Refills: 0 | Status: SHIPPED | OUTPATIENT
Start: 2024-12-16 | End: 2025-12-16

## 2024-12-16 RX ADMIN — FERROUS SULFATE TAB 325 MG (65 MG ELEMENTAL FE) 1 EACH: 325 (65 FE) TAB at 08:12

## 2024-12-16 RX ADMIN — HYDRALAZINE HYDROCHLORIDE 50 MG: 25 TABLET ORAL at 12:12

## 2024-12-16 RX ADMIN — ISOSORBIDE DINITRATE 20 MG: 10 TABLET ORAL at 08:12

## 2024-12-16 RX ADMIN — AMIODARONE HYDROCHLORIDE 200 MG: 200 TABLET ORAL at 08:12

## 2024-12-16 RX ADMIN — CEPHALEXIN 250 MG: 250 CAPSULE ORAL at 05:12

## 2024-12-16 RX ADMIN — HYDROCODONE BITARTRATE AND ACETAMINOPHEN 1 TABLET: 5; 325 TABLET ORAL at 09:12

## 2024-12-16 RX ADMIN — APIXABAN 5 MG: 5 TABLET, FILM COATED ORAL at 09:12

## 2024-12-16 RX ADMIN — POTASSIUM BICARBONATE 50 MEQ: 978 TABLET, EFFERVESCENT ORAL at 08:12

## 2024-12-16 RX ADMIN — IPRATROPIUM BROMIDE AND ALBUTEROL SULFATE 3 ML: .5; 3 SOLUTION RESPIRATORY (INHALATION) at 12:12

## 2024-12-16 RX ADMIN — METOPROLOL SUCCINATE 50 MG: 50 TABLET, FILM COATED, EXTENDED RELEASE ORAL at 09:12

## 2024-12-16 RX ADMIN — AMLODIPINE BESYLATE 10 MG: 5 TABLET ORAL at 08:12

## 2024-12-16 RX ADMIN — Medication 800 MG: at 08:12

## 2024-12-16 RX ADMIN — ASPIRIN 81 MG 81 MG: 81 TABLET ORAL at 08:12

## 2024-12-16 RX ADMIN — METOPROLOL SUCCINATE 50 MG: 50 TABLET, FILM COATED, EXTENDED RELEASE ORAL at 08:12

## 2024-12-16 RX ADMIN — IPRATROPIUM BROMIDE AND ALBUTEROL SULFATE 3 ML: .5; 3 SOLUTION RESPIRATORY (INHALATION) at 06:12

## 2024-12-16 RX ADMIN — APIXABAN 5 MG: 5 TABLET, FILM COATED ORAL at 08:12

## 2024-12-16 RX ADMIN — CEPHALEXIN 250 MG: 250 CAPSULE ORAL at 12:12

## 2024-12-16 RX ADMIN — IPRATROPIUM BROMIDE AND ALBUTEROL SULFATE 3 ML: .5; 3 SOLUTION RESPIRATORY (INHALATION) at 02:12

## 2024-12-16 RX ADMIN — HYDRALAZINE HYDROCHLORIDE 50 MG: 25 TABLET ORAL at 05:12

## 2024-12-16 RX ADMIN — FUROSEMIDE 20 MG: 20 TABLET ORAL at 08:12

## 2024-12-16 RX ADMIN — ATORVASTATIN CALCIUM 40 MG: 40 TABLET, FILM COATED ORAL at 09:12

## 2024-12-16 RX ADMIN — MIRTAZAPINE 15 MG: 7.5 TABLET, FILM COATED ORAL at 09:12

## 2024-12-16 RX ADMIN — DOXYCYCLINE HYCLATE 100 MG: 100 CAPSULE ORAL at 09:12

## 2024-12-16 RX ADMIN — HYDROCODONE BITARTRATE AND ACETAMINOPHEN 1 TABLET: 5; 325 TABLET ORAL at 02:12

## 2024-12-16 RX ADMIN — DOXYCYCLINE HYCLATE 100 MG: 100 CAPSULE ORAL at 08:12

## 2024-12-16 RX ADMIN — ISOSORBIDE DINITRATE 20 MG: 10 TABLET ORAL at 02:12

## 2024-12-16 NOTE — SUBJECTIVE & OBJECTIVE
Interval History:  Patient is seen and examined during multidisciplinary rounds.  Patient noted to have right-sided renal artery stenosis.  No obvious blood loss present.  Denies any chest pain, shortness of breath and currently afebrile.  Awaiting skilled nursing facility placement.    Review of Systems   All other systems reviewed and are negative.    Objective:     Vital Signs (Most Recent):  Temp: 97.6 °F (36.4 °C) (12/16/24 1155)  Pulse: 87 (12/16/24 1155)  Resp: 20 (12/16/24 1155)  BP: 133/84 (12/16/24 1155)  SpO2: (!) 93 % (12/16/24 1155) Vital Signs (24h Range):  Temp:  [97.6 °F (36.4 °C)-98.1 °F (36.7 °C)] 97.6 °F (36.4 °C)  Pulse:  [] 87  Resp:  [16-20] 20  SpO2:  [92 %-97 %] 93 %  BP: (118-167)/(69-90) 133/84     Weight: 110.9 kg (244 lb 7.8 oz)  Body mass index is 43.31 kg/m².    Intake/Output Summary (Last 24 hours) at 12/16/2024 1421  Last data filed at 12/16/2024 0521  Gross per 24 hour   Intake 600 ml   Output 1100 ml   Net -500 ml         Physical Exam  Constitutional:       General: She is not in acute distress.     Appearance: She is obese. She is not ill-appearing, toxic-appearing or diaphoretic.   HENT:      Head: Normocephalic and atraumatic.   Eyes:      General: No scleral icterus.        Right eye: No discharge.         Left eye: No discharge.   Neck:      Comments: No retractions; no nuchal rigidity  Cardiovascular:      Rate and Rhythm: Regular rhythm.      Heart sounds: Murmur (3/6 systolic murmur) heard.      No friction rub. No gallop.   Pulmonary:      Effort: Pulmonary effort is normal.      Breath sounds: Normal breath sounds.   Abdominal:      General: Bowel sounds are normal. There is no distension.      Palpations: Abdomen is soft. There is no mass.      Tenderness: There is no abdominal tenderness.   Musculoskeletal:      Right lower leg: Edema present improved.      Left lower leg: Edema present improved.   Skin:     General: Skin is warm and dry. Redness on both legs  improved   Neurological:      Comments: no focal signs. Oriented to place and person and time.        Significant Labs: All pertinent labs within the past 24 hours have been reviewed.  CBC:   Recent Labs   Lab 12/15/24  0353 12/16/24 0442   WBC 6.84 6.86   HGB 7.5* 7.5*   HCT 25.9* 25.5*    233     CMP:   Recent Labs   Lab 12/15/24  0353 12/16/24 0442   * 135*   K 3.9 3.7    101   CO2 27 27   GLU 91 93   BUN 20 22   CREATININE 1.7* 1.7*   CALCIUM 8.4* 8.5*   PROT 5.5* 5.5*   ALBUMIN 3.2* 3.1*   BILITOT 0.6 0.6   ALKPHOS 123 117   AST 12 14   ALT 9* 11   ANIONGAP 6* 7*     Microbiology Results (last 7 days)       Procedure Component Value Units Date/Time    Urine culture [[id number]]  (Abnormal)  (Susceptibility) Collected: 12/08/24 0530    Order Status: Completed Specimen: Urine Updated: 12/10/24 0619     Urine Culture, Routine ESCHERICHIA COLI  >100,000 cfu/ml      Narrative:      Specimen Source->Urine          Significant Imaging:   ECHO:    Left Ventricle: The left ventricle is normal in size. Moderately increased wall thickness. Unable to assess wall motion. There is normal systolic function with a visually estimated ejection fraction of 55 - 60%. Unable to assess diastolic function due to atrial fibrillation.    Right Ventricle: Right ventricle was not well visualized due to poor acoustic window.    Left Atrium: Left atrium is severely dilated.    Right Atrium: Right atrium is severely dilated.    Aortic Valve: There is moderate to severe stenosis. Aortic valve area by VTI is 1.0 cm². Aortic valve peak velocity is 3.4 m/s. Mean gradient is 27.0 mmHg. The dimensionless index is 0.32. There is mild aortic regurgitation.    IVC/SVC: Elevated venous pressure at 15 mmHg.    Renal Artery US:   1.  Small echogenic left kidney compatible with chronic medical renal disease (with no hydronephrosis or shadowing stone in either kidney).  2.  Echogenic right kidney, with elevated resistive indices  compatible with intrinsic medical renal disease.  3.  Bilateral simple renal cortical cysts.  4.  Relative elevated velocity and ratio in the right kidney suggesting a degree of renal artery stenosis on the right.    CT Head:  No CT evidence of acute intracranial abnormality. If the patient has an acute, focal neurological deficit, MRI of the brain may be indicated.  Generalized cerebral volume loss and chronic microvascular ischemic changes.    B/L hip x-rays: No acute findings.     CXR: No acute findings     Left humerus x-ray:  Osteopenia. No acute fracture or dislocation. Moderate glenohumeral osteoarthritis. Nonspecific calcifications project over the soft tissues of proximal arm.     Left shoulder x-ray: Osteopenia. No acute fracture or dislocation. Moderate to advanced glenohumeral and mild AC joint osteoarthritis. Nonspecific soft tissue calcification projects over the shoulder.     CT C-spine:  No acute trauma.     MRI Brain without contrast:  No acute ischemic process. Moderate to advanced periventricular and deep white matter changes of chronic microvascular ischemia. GRE sequence demonstrates punctate focus of susceptibility artifact in the right cerebellum and left basal ganglia representing microhemorrhage or calcification.     MRA Brain:  Examination is significantly limited secondary to motion with apparent absence of flow related enhancement within the left middle cerebral artery branches, left vertebral artery, and the left posterior cerebral artery.  This is most likely artifactual.  CTA may be attempted for further evaluation.    Bilateral lower extremity venous Doppler study:   No evidence of bilateral lower extremity DVT       Physical Exam

## 2024-12-16 NOTE — SUBJECTIVE & OBJECTIVE
Interval History:  Patient is seen and examined during multidisciplinary rounds.  Patient noted to have right-sided renal artery stenosis.  Vascular surgery evaluation pending.  Blood pressure is is steadily improving.  Slight downward trend of hemoglobin noted.  No obvious blood loss present.  Denies any chest pain, shortness of breath and currently afebrile.      Review of Systems   All other systems reviewed and are negative.    Objective:     Vital Signs (Most Recent):  Temp: 97.9 °F (36.6 °C) (12/16/24 0445)  Pulse: 95 (12/16/24 0654)  Resp: 18 (12/16/24 0654)  BP: 128/85 (12/16/24 0522)  SpO2: 97 % (12/16/24 0654) Vital Signs (24h Range):  Temp:  [97.9 °F (36.6 °C)-98.1 °F (36.7 °C)] 97.9 °F (36.6 °C)  Pulse:  [] 95  Resp:  [16-20] 18  SpO2:  [92 %-97 %] 97 %  BP: (118-167)/(67-90) 128/85     Weight: 110.9 kg (244 lb 7.8 oz)  Body mass index is 43.31 kg/m².    Intake/Output Summary (Last 24 hours) at 12/16/2024 0716  Last data filed at 12/16/2024 0521  Gross per 24 hour   Intake 600 ml   Output 1400 ml   Net -800 ml         Physical Exam  Constitutional:       General: She is not in acute distress.     Appearance: She is obese. She is not ill-appearing, toxic-appearing or diaphoretic.   HENT:      Head: Normocephalic and atraumatic.   Eyes:      General: No scleral icterus.        Right eye: No discharge.         Left eye: No discharge.   Neck:      Comments: No retractions; no nuchal rigidity  Cardiovascular:      Rate and Rhythm: Regular rhythm.      Heart sounds: Murmur (3/6 systolic murmur) heard.      No friction rub. No gallop.   Pulmonary:      Effort: Pulmonary effort is normal.      Breath sounds: Normal breath sounds.   Abdominal:      General: Bowel sounds are normal. There is no distension.      Palpations: Abdomen is soft. There is no mass.      Tenderness: There is no abdominal tenderness.   Musculoskeletal:      Right lower leg: Edema present improved.      Left lower leg: Edema present  improved.   Skin:     General: Skin is warm and dry. Redness on both legs improved   Neurological:      Comments: no focal signs. Oriented to place and person and time.        Significant Labs: All pertinent labs within the past 24 hours have been reviewed.  CBC:   Recent Labs   Lab 12/15/24  0353 12/16/24 0442   WBC 6.84 6.86   HGB 7.5* 7.5*   HCT 25.9* 25.5*    233     CMP:   Recent Labs   Lab 12/15/24  0353 12/16/24 0442   * 135*   K 3.9 3.7    101   CO2 27 27   GLU 91 93   BUN 20 22   CREATININE 1.7* 1.7*   CALCIUM 8.4* 8.5*   PROT 5.5* 5.5*   ALBUMIN 3.2* 3.1*   BILITOT 0.6 0.6   ALKPHOS 123 117   AST 12 14   ALT 9* 11   ANIONGAP 6* 7*     Microbiology Results (last 7 days)       Procedure Component Value Units Date/Time    Urine culture [[id number]]  (Abnormal)  (Susceptibility) Collected: 12/08/24 0530    Order Status: Completed Specimen: Urine Updated: 12/10/24 0619     Urine Culture, Routine ESCHERICHIA COLI  >100,000 cfu/ml      Narrative:      Specimen Source->Urine          Significant Imaging:   ECHO:    Left Ventricle: The left ventricle is normal in size. Moderately increased wall thickness. Unable to assess wall motion. There is normal systolic function with a visually estimated ejection fraction of 55 - 60%. Unable to assess diastolic function due to atrial fibrillation.    Right Ventricle: Right ventricle was not well visualized due to poor acoustic window.    Left Atrium: Left atrium is severely dilated.    Right Atrium: Right atrium is severely dilated.    Aortic Valve: There is moderate to severe stenosis. Aortic valve area by VTI is 1.0 cm². Aortic valve peak velocity is 3.4 m/s. Mean gradient is 27.0 mmHg. The dimensionless index is 0.32. There is mild aortic regurgitation.    IVC/SVC: Elevated venous pressure at 15 mmHg.    Renal Artery US:   1.  Small echogenic left kidney compatible with chronic medical renal disease (with no hydronephrosis or shadowing stone in either  kidney).  2.  Echogenic right kidney, with elevated resistive indices compatible with intrinsic medical renal disease.  3.  Bilateral simple renal cortical cysts.  4.  Relative elevated velocity and ratio in the right kidney suggesting a degree of renal artery stenosis on the right.    CT Head:  No CT evidence of acute intracranial abnormality. If the patient has an acute, focal neurological deficit, MRI of the brain may be indicated.  Generalized cerebral volume loss and chronic microvascular ischemic changes.    B/L hip x-rays: No acute findings.     CXR: No acute findings     Left humerus x-ray:  Osteopenia. No acute fracture or dislocation. Moderate glenohumeral osteoarthritis. Nonspecific calcifications project over the soft tissues of proximal arm.     Left shoulder x-ray: Osteopenia. No acute fracture or dislocation. Moderate to advanced glenohumeral and mild AC joint osteoarthritis. Nonspecific soft tissue calcification projects over the shoulder.     CT C-spine:  No acute trauma.     MRI Brain without contrast:  No acute ischemic process. Moderate to advanced periventricular and deep white matter changes of chronic microvascular ischemia. GRE sequence demonstrates punctate focus of susceptibility artifact in the right cerebellum and left basal ganglia representing microhemorrhage or calcification.     MRA Brain:  Examination is significantly limited secondary to motion with apparent absence of flow related enhancement within the left middle cerebral artery branches, left vertebral artery, and the left posterior cerebral artery.  This is most likely artifactual.  CTA may be attempted for further evaluation.    Bilateral lower extremity venous Doppler study:   No evidence of bilateral lower extremity DVT       Physical Exam

## 2024-12-16 NOTE — PROGRESS NOTES
[facility]  Department of Cardiology  Progress Note      PATIENT NAME: Iris Mueller  MRN: [id number]  TODAY'S DATE: 2024  ADMIT DATE: 2024                          CONSULT REQUESTED BY: Yazmin Jules MD    SUBJECTIVE     PRINCIPAL PROBLEM: AMS (altered mental status)      REASON FOR CONSULT:  uncontrolled hypertension, family request      2024    Patient sitting up in the chair in no distress.  Patient remains in AFIB rates are controlled. She has fallen multiple times over the past week. She is on Eliquis.  Blood pressure is more stable.        HPI:  Patient is 69-year-old female with PMH hypertension, CKD, AFib, COPD, CAD, hyperlipidemia, CVA, heart failure, aortic stenosis, who is currently hospitalized since 2024 for altered mental status, acute cystitis, cellulitis of right lower extremity, and hypertensive emergency.  Patient tells us she has been weak, off balance, and fell 4 times in the past 1 week.  Daughter was involved in consultation on speaker phone.  She states they have tried to get patient to [facility] but they have refused admits due to high heart rate and blood pressure.        Review of patient's allergies indicates:  No Known Allergies    Past Medical History:   Diagnosis Date    Anemia, unspecified     Arthritis     Asthma     COPD (chronic obstructive pulmonary disease)     Encounter for blood transfusion     Hypertension     Obese body habitus     Wound infection 2019    Right knee     Past Surgical History:   Procedure Laterality Date    ANGIOGRAM, CORONARY, WITH LEFT HEART CATHETERIZATION N/A 2022    Procedure: Angiogram, Coronary, with Left Heart Cath;  Surgeon: Jorge Tran MD;  Location: [facility] CATH/EP LAB;  Service: Cardiology;  Laterality: N/A;     SECTION      ECHOCARDIOGRAM,TRANSESOPHAGEAL N/A 2023    Procedure: Transesophageal echo (MARIANO) intra-procedure log documentation;  Surgeon: Provider, Dosc Diagnostic;   Location: [facility] EP LAB;  Service: Cardiology;  Laterality: N/A;    INCISION AND DRAINAGE OF HEMATOMA Left 2/5/2024    Procedure: INCISION AND DRAINAGE, HEMATOMA;  Surgeon: Bryson Huerta MD;  Location: [facility] OR;  Service: Orthopedics;  Laterality: Left;    JOINT REPLACEMENT      Knee    KNEE ARTHROPLASTY Right 8/14/2019    Procedure: ARTHROPLASTY, KNEE;  Surgeon: Delio Chung MD;  Location: [facility] OR;  Service: Orthopedics;  Laterality: Right;  anesthesia:  general and block    REPLACEMENT OF WOUND VACUUM-ASSISTED CLOSURE DEVICE Right 9/12/2019    Procedure: REPLACEMENT, WOUND VAC;  Surgeon: Delio Chung MD;  Location: [facility] OR;  Service: Orthopedics;  Laterality: Right;    TONSILLECTOMY      TREATMENT OF CARDIAC ARRHYTHMIA N/A 8/31/2023    Procedure: Cardioversion or Defibrillation;  Surgeon: PJ Betancourt MD;  Location: [facility] EP LAB;  Service: Cardiology;  Laterality: N/A;  AF, MARIANO, DCCV, MAC, EH, 3 Prep    VENTRICULOGRAM, LEFT  12/23/2022    Procedure: Ventriculogram, Left;  Surgeon: Jorge Tran MD;  Location: [facility] CATH/EP LAB;  Service: Cardiology;;     Social History     Tobacco Use    Smoking status: Every Day     Current packs/day: 0.25     Average packs/day: 0.5 packs/day for 50.0 years (23.7 ttl pk-yrs)     Types: Cigarettes     Start date: 1975     Passive exposure: Current    Smokeless tobacco: Never    Tobacco comments:     Pt states she is a 46 year smoker, smokes around 5-8 cigarettes per day. Interested in nicotine replacement while admitted. Information given on outpatient smoking cessation.   Substance Use Topics    Alcohol use: Yes     Comment: RARELY    Drug use: Never        REVIEW OF SYSTEMS  Negative except as mentioned in HPI    OBJECTIVE     VITAL SIGNS (Most Recent)  Temp: 97.6 °F (36.4 °C) (12/16/24 1155)  Pulse: 87 (12/16/24 1155)  Resp: 20 (12/16/24 1155)  BP: 133/84 (12/16/24 1155)  SpO2: (!) 93 % (12/16/24 1155)    VENTILATION STATUS  Resp: 20 (12/16/24  1155)  SpO2: (!) 93 % (12/16/24 1155)           I & O (Last 24H):  Intake/Output Summary (Last 24 hours) at 12/16/2024 1307  Last data filed at 12/16/2024 0521  Gross per 24 hour   Intake 600 ml   Output 1100 ml   Net -500 ml       WEIGHTS  Wt Readings from Last 3 Encounters:   12/13/24 0352 110.9 kg (244 lb 7.8 oz)   12/12/24 0510 108.7 kg (239 lb 10.2 oz)   12/08/24 0500 106 kg (233 lb 10.2 oz)   12/07/24 1944 99.8 kg (220 lb)   12/08/24 1335 106 kg (233 lb 11 oz)   10/30/24 1057 96.2 kg (212 lb)       PHYSICAL EXAM    GENERAL:  Elderly female sitting in the chair,  breathing comfortably in no apparent distress.  HEENT: Normocephalic.    NECK: No JVD.   CARDIAC: IRRegular rate and rhythm.; + systolic murmur  CHEST ANATOMY: bruising  LUNGS:  Moist cough, no dyspnea  ABDOMEN: Soft, obese .  Normal bowel sounds.    EXTREMITIES:  1-2+ edema to lower extremities,   CENTRAL NERVOUS SYSTEM: AAO x 3  SKIN:   bruising bilateral arms; and legs    HOME MEDICATIONS:  Current Facility-Administered Medications on File Prior to Encounter   Medication Dose Route Frequency Provider Last Rate Last Admin    lidocaine (PF) 10 mg/ml (1%) injection 5 mg  0.5 mL Intradermal Once Azeem Anderson MD         Current Outpatient Medications on File Prior to Encounter   Medication Sig Dispense Refill    amiodarone (PACERONE) 200 MG Tab Take 1 tablet (200 mg total) by mouth once daily. 30 tablet 11    apixaban (ELIQUIS) 5 mg Tab Take 5 mg by mouth 2 (two) times daily.      buPROPion (WELLBUTRIN SR) 150 MG TBSR 12 hr tablet Take 1 tablet (150 mg total) by mouth 2 (two) times daily. 180 tablet 3    FLUoxetine 40 MG capsule Take 1 capsule (40 mg total) by mouth once daily. 90 capsule 3    mirtazapine (REMERON) 15 MG tablet Take 1 tablet (15 mg total) by mouth every evening. For sleep 30 tablet 3    mupirocin (BACTROBAN) 2 % ointment Apply topically 2 (two) times daily. To infected skin tear 22 g 1    triamcinolone acetonide 0.1% (KENALOG) 0.1  "% cream Apply topically 2 (two) times daily. 60 g 2       SCHEDULED MEDS:   albuterol-ipratropium  3 mL Nebulization Q6H    amiodarone  200 mg Oral Daily    amLODIPine  10 mg Oral Daily    apixaban  5 mg Oral BID    aspirin  81 mg Oral Daily    atorvastatin  40 mg Oral QHS    cephALEXin  250 mg Oral Q6H    doxycycline  100 mg Oral Q12H    ferrous sulfate  1 tablet Oral Daily    furosemide  20 mg Oral Every Mon, Wed, Fri    hydrALAZINE  50 mg Oral Q8H    isosorbide dinitrate  20 mg Oral TID    magnesium oxide  400 mg Oral Daily    metoprolol succinate  50 mg Oral BID    mirtazapine  15 mg Oral QHS       CONTINUOUS INFUSIONS:    PRN MEDS:  Current Facility-Administered Medications:     acetaminophen, 650 mg, Oral, Q6H PRN    dextromethorphan-guaiFENesin  mg/5 ml, 15 mL, Oral, Q6H PRN    gabapentin, 300 mg, Oral, Nightly PRN    hydrALAZINE, 10 mg, Intravenous, Q4H PRN    HYDROcodone-acetaminophen, 1 tablet, Oral, Q6H PRN    magnesium oxide, 800 mg, Oral, PRN    magnesium oxide, 800 mg, Oral, PRN    ondansetron, 4 mg, Intravenous, Q6H PRN    potassium bicarbonate, 35 mEq, Oral, PRN    potassium bicarbonate, 50 mEq, Oral, PRN    potassium bicarbonate, 60 mEq, Oral, PRN    potassium, sodium phosphates, 2 packet, Oral, PRN    potassium, sodium phosphates, 2 packet, Oral, PRN    potassium, sodium phosphates, 2 packet, Oral, PRN    LABS AND DIAGNOSTICS     CBC LAST 3 DAYS  Recent Labs   Lab 12/14/24  0247 12/15/24  0353 12/16/24  0442   WBC 6.94 6.84 6.86   RBC 3.18* 2.88* 2.84*   HGB 8.3* 7.5* 7.5*   HCT 28.6* 25.9* 25.5*   MCV 90 90 90   MCH 26.1* 26.0* 26.4*   MCHC 29.0* 29.0* 29.4*   RDW 19.0* 19.3* 19.6*    231 233   MPV 9.0* 9.1* 9.0*   GRAN 78.0*  5.4 77.0*  5.3 73.9*  5.1   LYMPH 9.9*  0.7* 10.2*  0.7* 11.4*  0.8*   MONO 7.1  0.5 8.5  0.6 9.8  0.7   BASO 0.03 0.02 0.03   NRBC 0 0 0       COAGULATION LAST 3 DAYS  No results for input(s): "LABPT", "INR", "APTT" in the last 168 " "hours.      CHEMISTRY LAST 3 DAYS  Recent Labs   Lab 12/14/24  0247 12/15/24  0353 12/16/24  0442    135* 135*   K 3.8 3.9 3.7    102 101   CO2 27 27 27   ANIONGAP 8 6* 7*   BUN 20 20 22   CREATININE 1.7* 1.7* 1.7*   GLU 94 91 93   CALCIUM 8.5* 8.4* 8.5*   MG 1.8 1.8 1.8   ALBUMIN 3.4* 3.2* 3.1*   PROT 6.0 5.5* 5.5*   ALKPHOS 138* 123 117   ALT 11 9* 11   AST 14 12 14   BILITOT 0.7 0.6 0.6       CARDIAC PROFILE LAST 3 DAYS  No results for input(s): "BNP", "CPK", "CPKMB", "LDH", "TROPONINI" in the last 168 hours.    ENDOCRINE LAST 3 DAYS  No results for input(s): "TSH", "PROCAL" in the last 168 hours.      LAST ARTERIAL BLOOD GAS  ABG  No results for input(s): "PH", "PO2", "PCO2", "HCO3", "BE" in the last 168 hours.    LAST 7 DAYS MICROBIOLOGY   Microbiology Results (last 7 days)       Procedure Component Value Units Date/Time    Urine culture [[id number]]  (Abnormal)  (Susceptibility) Collected: 12/08/24 0530    Order Status: Completed Specimen: Urine Updated: 12/10/24 0619     Urine Culture, Routine ESCHERICHIA COLI  >100,000 cfu/ml      Narrative:      Specimen Source->Urine            MOST RECENT IMAGING  US Renal Artery Stenosis [employer]  Narrative: CLINICAL HISTORY:  ([id number])68 y/o  (1955) F    Htn uncontrolled;    TECHNIQUE:  (A#[id number], exam time 12/14/2024 9:50)    US RENAL ARTERY STENOSIS Activity Rocket [id number]    A complete ultrasound examination of the retroperitoneum and a color flow and spectral Doppler studies of the renal vasculature were performed.    COMPARISON:  None available.    FINDINGS:  Please note this examination is technically suboptimal due to patient related factors  including increased respiratory rate/motion, inability to breath hold, body habitus as well as overlying bowel gas.    Right KIDNEY: Normal in size. Mildly increased in echotexture.  There is no evidence of renal cysts stones large enough to cause acoustic shadowing contour-deforming mass or " hydronephrosis.    Renal size: 10.3 x 6.1 x 4.2 cm    Left KIDNEY: Small in size and increased in echotexture.  There is an 18 x 17 mm exophytic simple cyst in the upper pole the left kidney.  No hydronephrosis, shadowing stone or contour deforming solid renal masses identified.    Renal size: 6.1 x 3.2 x 3.2 cm    Urinary bladder: Normally distended. No stones  wall thickening  or focal mass.    Additional findings:    -the AORTA and IVC are within normal limits where visualized.    VASCULAR examination:    Abdominal aorta (PSV in the region of the main renal arteries): 45 cm/s    Right renal vascular exam: The resistive indices are elevated, in combination with the echogenic kidney compatible with intrinsic medical renal disease.  In addition there appears to be a relative elevated ratio in the mid renal artery suggesting renal artery stenosis.    Resistive index (upper, mid, lower): 1, 1, 0.9    Main renal artery PSV (origin, mid, hilar): 94 cm/s, 114 cm/s, and 61 cm/s    RAR/Ao PSV ratio: 2.5    Renal vein peak velocity: 26 cm/s    Left renal vascular exam: The resistive index in the lower pole appears elevated suggesting a component of intrinsic medical renal disease.  The peak systolic velocity (PSV), and renal artery/aortic ratio is top-normal, without convincing focal stenosis identified.    Resistive index (upper, mid, lower): 0.7, 0.6, and 1    Main renal artery PSV (origin, mid, hilar): 75 cm/s, 77 cm/s, and 79 cm/s    RAR/Ao PSV ratio: 1.8    Renal vein peak velocity: 58 cm/s  Impression: 1.  Small echogenic left kidney compatible with chronic medical renal disease (with no hydronephrosis or shadowing stone in either kidney).    2.  Echogenic right kidney, with elevated resistive indices compatible with intrinsic medical renal disease.    3.  Bilateral simple renal cortical cysts.    4.  Relative elevated velocity and ratio in the right kidney suggesting a degree of renal artery stenosis on the  right.    Electronically signed by: Jeffry Ellis  Date:    12/14/2024  Time:    09:56      ECHOCARDIOGRAM RESULTS (last 5)  Results for orders placed during the hospital encounter of 12/07/24    Echo Saline Bubble? No    Interpretation Summary    Left Ventricle: The left ventricle is normal in size. Moderately increased wall thickness. Unable to assess wall motion. There is normal systolic function with a visually estimated ejection fraction of 55 - 60%. Unable to assess diastolic function due to atrial fibrillation.    Right Ventricle: Right ventricle was not well visualized due to poor acoustic window.    Left Atrium: Left atrium is severely dilated.    Right Atrium: Right atrium is severely dilated.    Aortic Valve: There is moderate to severe stenosis. Aortic valve area by VTI is 1.0 cm². Aortic valve peak velocity is 3.4 m/s. Mean gradient is 27.0 mmHg. The dimensionless index is 0.32. There is mild aortic regurgitation.    IVC/SVC: Elevated venous pressure at 15 mmHg.      Results for orders placed during the hospital encounter of 08/31/23    Transesophageal echo (MARIANO)    Interpretation Summary    MARIANO to evaluate the LA/WENCESLAO prior to DCCV.    Left Ventricle: The left ventricle is normal in size. Normal wall thickness. Normal wall motion. There is low normal systolic function with a visually estimated ejection fraction of 50 - 55%.    Left Atrium: Left atrium is dilated. The left atrial appendage appears normal. Appendage velocity is reduced at less than 40 cm/sec. There is no thrombus in the cavity or appendage. Light spontaneous echo contrast visualized in the left atrial cavity and appendage.    Right Ventricle: Normal right ventricular cavity size. Wall thickness is normal. Right ventricle wall motion is ormal. Systolic function is normal.    Right Atrium: Right atrium is dilated.    Aortic Valve: The aortic valve is a trileaflet valve. There is moderate aortic valve sclerosis. Mildly restricted motion.  Aortic valve stenosis is present, but unable to grade severity of AS. There is mild aortic regurgitation.    Mitral Valve: Mild mitral annular calcification. There is mild regurgitation.    Tricuspid Valve: There is mild regurgitation.    Aorta: Grade 2 atherosclerosis of the descending aorta.      Results for orders placed during the hospital encounter of 12/22/22    Echo    Interpretation Summary  · The estimated ejection fraction is 40%. There is a anterior apical segment that is hypo to akinetic with a wall motion abnormality  · Grade II left ventricular diastolic dysfunction. Mean left atrial pressure 20 mm Hg. Mild mitral annular calcification  · There are segmental left ventricular wall motion abnormalities.  · Normal right ventricular size with normal right ventricular systolic function.  · Moderate left atrial enlargement.  · Moderate aortic regurgitation.  · There is moderate aortic valve stenosis.  · Aortic valve area is 1.55 cm2; peak velocity is 2.88 m/s; mean gradient is 15 mmHg.  · Moderate mitral regurgitation.  · There is mild pulmonary hypertension.  · Mild tricuspid regurgitation.  · Elevated central venous pressure (15 mmHg).  · The estimated PA systolic pressure is 47 mmHg.      Results for orders placed during the hospital encounter of 09/12/19    Echo Color Flow Doppler? Yes    Interpretation Summary  · Concentric left ventricular remodeling.  · Normal left ventricular systolic function. The estimated ejection fraction is 60%  · Normal LV diastolic function.  · Mild aortic regurgitation.  · Elevated central venous pressure (15 mm Hg).  · The estimated PA systolic pressure is 32 mm Hg      CURRENT/PREVIOUS VISIT EKG  Results for orders placed or performed during the hospital encounter of 12/07/24   ECG 12 lead    Collection Time: 12/07/24  8:08 PM   Result Value Ref Range    QRS Duration 112 ms    [facility]HS QTC Calculation 490 ms    Narrative    Test Reason : R29.818,    Vent. Rate :  85 BPM      Atrial Rate :    BPM     P-R Int :    ms          QRS Dur : 112 ms      QT Int : 412 ms       P-R-T Axes :     91  30 degrees    QTcB Int : 490 ms    Atrial fibrillation  Rightward axis  Nonspecific ST abnormality  Prolonged QT  Abnormal ECG  No previous ECGs available  Confirmed by Oli Lara ([id number]) on 12/8/2024 9:35:02 AM    Referred By: AAAREFERRAL SELF           Confirmed By: Oli Lara           ASSESSMENT/PLAN:     Active Hospital Problems    Diagnosis    *AMS (altered mental status)    Acute cystitis without hematuria    Hypertensive emergency    Cellulitis of right lower extremity    Tobacco dependency    Chronic combined systolic and diastolic heart failure    Pulmonary hypertension    Persistent atrial fibrillation    Nonrheumatic aortic valve stenosis with regurgitation    Morbid obesity    CKD (chronic kidney disease) stage 4, GFR 15-29 ml/min       ASSESSMENT & PLAN:     Frequent Falls 4 prior to admit in 1 week  Hypertension  Rt renal artery stenosis  Altered mental status  Aortic valve stenosis Moderate to severe  Persistent atrial fibrillation-CHADS 3  CKD  Cellulitis  Anemia Hg 7.5  UTI  Pulmonary HTN  Morbid Obesity    RECOMMENDATIONS:    Patient would benefit from therapy prior to going home.  She has had multiple falls recently and is on Eliquis this is of concern to me.  She would probably benefit from a Watchman Procedure outpatient  Will need to monitor her Aortic Valve Op  Patient also with a history of CKD and anemia this is of concern on the eliquis as well  Would recommend therapy OP prior to going home with her  whom is not much help to her as he has his own co morbidities.      Carolyne Payne NP  Count includes the [facility]  Department of Cardiology  Date of Service: 12/16/2024        I have personally interviewed and examined the patient, I have reviewed the Nurse Practitioner's history and physical, assessment, and plan. I agree with the findings and plan.      Dr. Bob,  M.D.  [facility]  Department of Cardiology  Date of Service: 12/16/2024  4:13 PM

## 2024-12-16 NOTE — RESPIRATORY THERAPY
12/15/24 1958   Patient Assessment/Suction   Level of Consciousness (AVPU) alert   Respiratory Effort Normal;Unlabored   Expansion/Accessory Muscles/Retractions no retractions;no use of accessory muscles   All Lung Fields Breath Sounds diminished   Rhythm/Pattern, Respiratory pattern regular;unlabored   Cough Frequency no cough   PRE-TX-O2   Device (Oxygen Therapy) room air   SpO2 95 %   Pulse Oximetry Type Intermittent   $ Pulse Oximetry - Multiple Charge Pulse Oximetry - Multiple   Pulse 93   Resp 16   Aerosol Therapy   $ Aerosol Therapy Charges Aerosol Treatment   Daily Review of Necessity (SVN) completed   Respiratory Treatment Status (SVN) given   Treatment Route (SVN) mask;oxygen   Patient Position HOB elevated   Post Treatment Assessment (SVN) breath sounds unchanged   Signs of Intolerance (SVN) none   Education   $ Education Bronchodilator;15 min

## 2024-12-16 NOTE — PROGRESS NOTES
"[facility] Medicine  Progress Note    Patient Name: Iris Mueller  MRN: [id number]  Patient Class: IP- Inpatient   Admission Date: 12/7/2024  Length of Stay: 8 days  Attending Physician: Yazmin Jules MD  Primary Care Provider: Elkin You MD        Subjective     Principal Problem:AMS (altered mental status)        HPI:  The patient is a 69-year-old  female with a history of hypertension, CKD-4, atrial fibrillation (on Eliquis), morbid obesity, COPD, hyperlipidemia, CAD, aortic stenosis/regurgitation, stroke, and history of systolic/diastolic congestive heart failure.  Her last echo performed in 2022 showed, per Dr. Wright:  ·   "The estimated ejection fraction is 40%. There is a anterior apical segment that is hypo to akinetic with a wall motion abnormality  · Grade II left ventricular diastolic dysfunction. Mean left atrial pressure 20 mm Hg. Mild mitral annular calcification  · There are segmental left ventricular wall motion abnormalities.  · Normal right ventricular size with normal right ventricular systolic function.  · Moderate left atrial enlargement.  · Moderate aortic regurgitation.  · There is moderate aortic valve stenosis.  · Aortic valve area is 1.55 cm2; peak velocity is 2.88 m/s; mean gradient is 15 mmHg.  · Moderate mitral regurgitation.  · There is mild pulmonary hypertension.  · Mild tricuspid regurgitation.  · Elevated central venous pressure (15 mmHg).  · The estimated PA systolic pressure is 47 mmHg."    Please note the obtaining history from the patient is impossible, given her current mental state.      The patient lives home with her  who has visual and hearing impediment.  Thus, the majority of the history is obtained from the ER physician, records, and 2 adult children at bedside.    Her children at bedside chair concern that the patient takes her prescribed narcotics inappropriately.  The patient was brought in tonight because of " "recurrent falls.  Over the last 2-3 weeks, the patient had complained that she was "off balance" and had fallen 7-8 times.    Around the 630 p.m., family worse talking to the patient on the phone apparently and knows that she had slurred speech and that she was "stuck on the Ottoman inked".  Somehow, the patient reached the neighbor and by the time the children got to the house she was sitting in her recliner (has been moved from the dining room chair, presumably with the assistance of the neighbor).  Thus she was conducted to the ER.      While here she was afebrile but hypertensive at 224/109 and this came down to 146/108.  NIH was 1.    Workup showed that head CT for stroke was negative.  She had multiple imaging studies including bilateral hip x-rays with pelvis, chest x-ray, left humerus, left shoulder, and CT cervical spine without contrast, all of which were unrevealing.    MRI brain without contrast showed (with an addenda, per radiologist Dr. Narvaez):    "Impression:     No acute ischemic process. Moderate to advanced periventricular and deep white matter changes of chronic microvascular ischemia. GRE sequence demonstrates punctate focus of susceptibility artifact in the right cerebellum and left basal ganglia representing microhemorrhage or calcification."    Furthermore, the patient had an MRI brain without contrast which showed, per radiologist Dr. Burnett:    Impression:     "Examination is significantly limited secondary to motion with apparent absence of flow related enhancement within the left middle cerebral artery branches, left vertebral artery, and the left posterior cerebral artery.  This is most likely artifactual.     CTA may be attempted for further evaluation."    She was evaluated by vascular Neurology and my understanding is that she was not considered an intervention candidate.  Additionally, as her last known normal was not precisely known (as she is already fully anticoagulated with " Eliquis), she was not considered to be a tenecteplase candidate, either.  She is admitted for further diagnosis, treatment, and care.    Overview/Hospital Course:  Patient is a 69 year old female with history of HTN, presented with recurrent falls, and slurred speech. Patient was found to be in hypertensive emergency on admission. CT C spine and head was negative. MRI brain was negative for acute findings. Showed punctate focus of susceptibility artifact in the right cerebellum and left basal ganglia representing microhemorrhage or calcification. Neurology was consulted. Patient was started on aspirin and Lipitor for possible TIA. Her home Eliquis was resumed. Patient was also started on PO Doxycycline for right lower extremity cellulitis. Nephrology consulted per family request for CKD IV. Patient continue to have episodes of confusion. Dementia work up sent. Patient worked with PT, OT and recommended SNF.  Norvasc was increased. BP better controlled. Mental status improved. Patient discharged to [place] for SNF. PO Doxy to continue for 10 days total. Patient was discharged but didn't leave as the facility did not take her due to BP being variable.  Skilled nursing facility did not accept the patient.  Renal artery Doppler ultrasound confirmed right-sided renal artery stenosis for which vascular surgery is being consulted.  Cardiologist was consulted at family request.    Interval History:  Patient is seen and examined during multidisciplinary rounds.  Patient noted to have right-sided renal artery stenosis.  No obvious blood loss present.  Denies any chest pain, shortness of breath and currently afebrile.  Awaiting skilled nursing facility placement.    Review of Systems   All other systems reviewed and are negative.    Objective:     Vital Signs (Most Recent):  Temp: 97.6 °F (36.4 °C) (12/16/24 1155)  Pulse: 87 (12/16/24 1155)  Resp: 20 (12/16/24 1155)  BP: 133/84 (12/16/24 1155)  SpO2: (!) 93 % (12/16/24  1155) Vital Signs (24h Range):  Temp:  [97.6 °F (36.4 °C)-98.1 °F (36.7 °C)] 97.6 °F (36.4 °C)  Pulse:  [] 87  Resp:  [16-20] 20  SpO2:  [92 %-97 %] 93 %  BP: (118-167)/(69-90) 133/84     Weight: 110.9 kg (244 lb 7.8 oz)  Body mass index is 43.31 kg/m².    Intake/Output Summary (Last 24 hours) at 12/16/2024 1421  Last data filed at 12/16/2024 0521  Gross per 24 hour   Intake 600 ml   Output 1100 ml   Net -500 ml         Physical Exam  Constitutional:       General: She is not in acute distress.     Appearance: She is obese. She is not ill-appearing, toxic-appearing or diaphoretic.   HENT:      Head: Normocephalic and atraumatic.   Eyes:      General: No scleral icterus.        Right eye: No discharge.         Left eye: No discharge.   Neck:      Comments: No retractions; no nuchal rigidity  Cardiovascular:      Rate and Rhythm: Regular rhythm.      Heart sounds: Murmur (3/6 systolic murmur) heard.      No friction rub. No gallop.   Pulmonary:      Effort: Pulmonary effort is normal.      Breath sounds: Normal breath sounds.   Abdominal:      General: Bowel sounds are normal. There is no distension.      Palpations: Abdomen is soft. There is no mass.      Tenderness: There is no abdominal tenderness.   Musculoskeletal:      Right lower leg: Edema present improved.      Left lower leg: Edema present improved.   Skin:     General: Skin is warm and dry. Redness on both legs improved   Neurological:      Comments: no focal signs. Oriented to place and person and time.        Significant Labs: All pertinent labs within the past 24 hours have been reviewed.  CBC:   Recent Labs   Lab 12/15/24  0353 12/16/24  0442   WBC 6.84 6.86   HGB 7.5* 7.5*   HCT 25.9* 25.5*    233     CMP:   Recent Labs   Lab 12/15/24  0353 12/16/24  0442   * 135*   K 3.9 3.7    101   CO2 27 27   GLU 91 93   BUN 20 22   CREATININE 1.7* 1.7*   CALCIUM 8.4* 8.5*   PROT 5.5* 5.5*   ALBUMIN 3.2* 3.1*   BILITOT 0.6 0.6   ALKPHOS 123  117   AST 12 14   ALT 9* 11   ANIONGAP 6* 7*     Microbiology Results (last 7 days)       Procedure Component Value Units Date/Time    Urine culture [[id number]]  (Abnormal)  (Susceptibility) Collected: 12/08/24 0530    Order Status: Completed Specimen: Urine Updated: 12/10/24 0619     Urine Culture, Routine ESCHERICHIA COLI  >100,000 cfu/ml      Narrative:      Specimen Source->Urine          Significant Imaging:   ECHO:    Left Ventricle: The left ventricle is normal in size. Moderately increased wall thickness. Unable to assess wall motion. There is normal systolic function with a visually estimated ejection fraction of 55 - 60%. Unable to assess diastolic function due to atrial fibrillation.    Right Ventricle: Right ventricle was not well visualized due to poor acoustic window.    Left Atrium: Left atrium is severely dilated.    Right Atrium: Right atrium is severely dilated.    Aortic Valve: There is moderate to severe stenosis. Aortic valve area by VTI is 1.0 cm². Aortic valve peak velocity is 3.4 m/s. Mean gradient is 27.0 mmHg. The dimensionless index is 0.32. There is mild aortic regurgitation.    IVC/SVC: Elevated venous pressure at 15 mmHg.    Renal Artery US:   1.  Small echogenic left kidney compatible with chronic medical renal disease (with no hydronephrosis or shadowing stone in either kidney).  2.  Echogenic right kidney, with elevated resistive indices compatible with intrinsic medical renal disease.  3.  Bilateral simple renal cortical cysts.  4.  Relative elevated velocity and ratio in the right kidney suggesting a degree of renal artery stenosis on the right.    CT Head:  No CT evidence of acute intracranial abnormality. If the patient has an acute, focal neurological deficit, MRI of the brain may be indicated.  Generalized cerebral volume loss and chronic microvascular ischemic changes.    B/L hip x-rays: No acute findings.     CXR: No acute findings     Left humerus x-ray:  Osteopenia. No  acute fracture or dislocation. Moderate glenohumeral osteoarthritis. Nonspecific calcifications project over the soft tissues of proximal arm.     Left shoulder x-ray: Osteopenia. No acute fracture or dislocation. Moderate to advanced glenohumeral and mild AC joint osteoarthritis. Nonspecific soft tissue calcification projects over the shoulder.     CT C-spine:  No acute trauma.     MRI Brain without contrast:  No acute ischemic process. Moderate to advanced periventricular and deep white matter changes of chronic microvascular ischemia. GRE sequence demonstrates punctate focus of susceptibility artifact in the right cerebellum and left basal ganglia representing microhemorrhage or calcification.     MRA Brain:  Examination is significantly limited secondary to motion with apparent absence of flow related enhancement within the left middle cerebral artery branches, left vertebral artery, and the left posterior cerebral artery.  This is most likely artifactual.  CTA may be attempted for further evaluation.    Bilateral lower extremity venous Doppler study:   No evidence of bilateral lower extremity DVT       Physical Exam    Assessment and Plan     * AMS (altered mental status)  Acute encephalopathy vs progressive dementia with delirium   MRI negative for acute stroke   Could be related to hypertensive encephalopathy however family states symptoms has been going on for about 2 months   normal B12, folate RPR and ammonia level   Improved on discharge   - Cont aspirin, Lipitor, Eliquis     Acute cystitis without hematuria  PO Keflex per sensitivity for 7 days       Cellulitis of right lower extremity  US DVT negative  PO Doxycycline D5, for total 10 days       Hypertensive emergency  With slurred speech and confusion     Home meds for hypertension were reviewed and noted below.   Hypertension Medications               amLODIPine (NORVASC) 2.5 MG tablet Take 1 tablet (2.5 mg total) by mouth every evening.    metoprolol  succinate (TOPROL-XL) 25 MG 24 hr tablet Take 1 tablet (25 mg total) by mouth once daily.          Norvasc increased to 10 mg  Cont metoprolol   Increase isosorbide dinitrate 20 mg p.o. t.i.d.  PO hydralazine and Lasix added     Tobacco dependency  Nicotine patch    Chronic combined systolic and diastolic heart failure  As above    Pulmonary hypertension    As above    Nonrheumatic aortic valve stenosis with regurgitation  Echo shows moderate to severe stenosis     Persistent atrial fibrillation  Resume home Amiodarone  Resume metoprolol increase to 50 mg   Per records, patient has a CHADS-VASc score of 3  On Eliquis     Morbid obesity  Body mass index is 43.31 kg/m².   Complicates all aspects of care        CKD (chronic kidney disease) stage 4, GFR 15-29 ml/min  Creatinine at baseline  Minimize nephrotoxic medications  Nephrology consulted per family request   Cr remained stable     Discussed with  and bedside nurse.  Awaiting skilled nursing facility placement.  VTE Risk Mitigation (From admission, onward)           Ordered     apixaban tablet 5 mg  2 times daily         12/09/24 0748     IP VTE HIGH RISK PATIENT  Once         12/08/24 0234     Place sequential compression device  Until discontinued         12/08/24 0234                    Discharge Planning   VALERIANO: 12/16/2024     Code Status: Full Code   Medical Readiness for Discharge Date: 12/11/2024  Discharge Plan A:  Skilled nursing facility  Discharge Delays: (!) Waiting for Provider to Speak to Patient      Yazmin Jules MD  Department of Hospital Medicine   [facility]

## 2024-12-16 NOTE — PLAN OF CARE
Problem: Adult Inpatient Plan of Care  Goal: Plan of Care Review  Outcome: Met  Goal: Patient-Specific Goal (Individualized)  Outcome: Met  Goal: Absence of Hospital-Acquired Illness or Injury  Outcome: Met  Goal: Optimal Comfort and Wellbeing  Outcome: Met  Goal: Readiness for Transition of Care  Outcome: Met     Problem: Fall Injury Risk  Goal: Absence of Fall and Fall-Related Injury  Outcome: Met     Problem: Infection  Goal: Absence of Infection Signs and Symptoms  Outcome: Met     Problem: Wound  Goal: Optimal Coping  Outcome: Met  Goal: Optimal Functional Ability  Outcome: Met  Goal: Absence of Infection Signs and Symptoms  Outcome: Met  Goal: Improved Oral Intake  Outcome: Met  Goal: Optimal Pain Control and Function  Outcome: Met  Goal: Skin Health and Integrity  Outcome: Met  Goal: Optimal Wound Healing  Outcome: Met     Problem: Bariatric Environmental Safety  Goal: Safety Maintained with Care  Outcome: Met     Problem: Skin Injury Risk Increased  Goal: Skin Health and Integrity  Outcome: Met

## 2024-12-16 NOTE — PLAN OF CARE
Discharge orders and chart reviewed with no further post-acute discharge needs identified at this time.  At this time, patient is cleared for discharge from Case Management standpoint.     Patient will discharge home resuming [facility], SOC: 12/18/2024.       12/16/24 1108   Final Note   Assessment Type Final Discharge Note   Anticipated Discharge Disposition [facility] Resources/Appts/Education Provided Appointments scheduled and added to AVS   Post-Acute Status   Post-Acute Authorization [facility] Status Set-up Complete/Auth obtained   Discharge Delays None known at this time

## 2024-12-16 NOTE — PLAN OF CARE
Per Penny Bronson, reviewing again for acceptance.    Authorization request sent to [facility] via Epic.       12/16/24 2405   Post-Acute Status   Post-Acute Authorization Placement   Post-Acute Placement Status Pending payor review/awaiting authorization (if required)   Patient choice form signed by patient/caregiver List with quality metrics by geographic area provided   Discharge Delays None known at this time   Discharge Plan   Discharge Plan A Skilled Nursing Facility   Discharge Plan B Skilled Nursing Facility

## 2024-12-16 NOTE — PT/OT/SLP PROGRESS
Physical Therapy      Patient Name:  Iris Mueller   MRN:  [id number]    Patient not seen today secondary to Patient unwilling to participate, Other (Comment) (being discharged home & needs to find a ride.). Will follow-up 12/17/24 if not discharged home today as anticipated.

## 2024-12-16 NOTE — PROGRESS NOTES
INPATIENT NEPHROLOGY Progress Note  [facility]    Patient Name: Iris Mueller  Date: 12/16/2024    Reason for consultation: CKD IV    Chief Complaint:   Chief Complaint   Patient presents with    Headache     Fell twice today , slurred speech started at 1830       History of Present Illness:  Patient is a 69 year old female with history of HTN, presented with recurrent falls, and slurred speech. Patient was found to be in hypertensive emergency on admission. CT C spine and head was negative. MRI brain was negative for acute findings. Showed punctate focus of susceptibility artifact in the right cerebellum and left basal ganglia representing microhemorrhage or calcification. Neurology was consulted. Patient was started on aspirin and Lipitor for possible TIA. Her home Eliquis was resumed. Patient was also started on PO Doxycycline for right lower extremity cellulitis. Nephrology consulted per family request for CKD IV. Patient continue to have episodes of confusion. Dementia work up sent. Patient worked with PT, OT and recommended SNF.     Interval History:  12/9- work on BP control- then edema management  12/10  975 ml UOP recorded.  BP starting to improve--still high at times.  Na+ improved.  Renal function stable.  Sleepy, is able to converse some, though.  12/11  one shift  ml.  BP meds adjusted yesterday, BP still high.  renal function stable.  Hydralazine and lasix added today.  Pt up w/OT, feeling better.  12/12  One shift UOP recorded, 600 ml.  Still w/BP spikes, hydralazine adjusted.  Renal function stable.  Sleepy, awakens easily.   12/13 HR > 90, BP trends improved, on RA   12/14  one shift UOP recorded, 500 ml.  Pressures still high, hydralazine adjusted yest, metoprolol adjusted today.  Isordil added.  US in progress.  12/15  UOP 1L, BP started looking a bit better yesterday afternoon, only one reading today--141/81.  Renal function stable.  Renal duplex w/medical  renal dz and a degree of DANNIELLE on right.  No new complaints.  12/16 VSS. Appreciate input from Vascular and Cards, no need for angiogram, ok to go to rehab and we will follow there...    Plan of Care:    CKD IV  HTN emerg/TIA  Edema  Tachycardia  Hyponatremia  Secondary HPT  Anemia of CKD  Hypomagnesemia    Plan:    - renal function at baseline- improving over last few days  - not uremic- mental status issues combo of underlying age related and vascular dementia chronically and HTN emerg causing TIA acutely  - agree with norvasc, started hydralazine 12/11, adjusted 12/12  - continue lasix MWF or per Cards  - adjusted metoprolol, hydralazine. Renal duplex done, see by Dr. Marin, no need for angiogram.  - 2g salt, 1.5L fluid per day as tolerated  - no active BMM issues  - supplement iron- DENEEN 10K TIW while in hospital, off during rehab, then resume as outpatient if needed, may need blood transfusion if not responding  - IV Mg as needed- added MgOH 400mg     Thank you for allowing us to participate in this patient's care. We will continue to follow.    Vital Signs:  Temp Readings from Last 3 Encounters:   12/16/24 97.8 °F (36.6 °C) (Oral)   02/12/24 97.8 °F (36.6 °C) (Oral)   01/12/24 97.6 °F (36.4 °C) (Oral)       Pulse Readings from Last 3 Encounters:   12/16/24 100   10/30/24 89   09/10/24 92       BP Readings from Last 3 Encounters:   12/16/24 (!) 146/86   10/30/24 130/80   09/10/24 132/82       Weight:  Wt Readings from Last 3 Encounters:   12/13/24 110.9 kg (244 lb 7.8 oz)   12/08/24 106 kg (233 lb 11 oz)   10/30/24 96.2 kg (212 lb)       INS/OUTS:  I/O last 3 completed shifts:  In: 600 [P.O.:600]  Out: 1650 [Urine:1650]  No intake/output data recorded.    Past Medical & Surgical History:  Past Medical History:   Diagnosis Date    Anemia, unspecified     Arthritis     Asthma     COPD (chronic obstructive pulmonary disease)     Encounter for blood transfusion     Hypertension     Obese body habitus     Wound  infection 2019    Right knee       Past Surgical History:   Procedure Laterality Date    ANGIOGRAM, CORONARY, WITH LEFT HEART CATHETERIZATION N/A 2022    Procedure: Angiogram, Coronary, with Left Heart Cath;  Surgeon: Jorge Tran MD;  Location: [facility] CATH/EP LAB;  Service: Cardiology;  Laterality: N/A;     SECTION      ECHOCARDIOGRAM,TRANSESOPHAGEAL N/A 2023    Procedure: Transesophageal echo (MARIANO) intra-procedure log documentation;  Surgeon: Provider, [facility];  Location: [facility] EP LAB;  Service: Cardiology;  Laterality: N/A;    INCISION AND DRAINAGE OF HEMATOMA Left 2024    Procedure: INCISION AND DRAINAGE, HEMATOMA;  Surgeon: Bryson Huerta MD;  Location: [facility] OR;  Service: Orthopedics;  Laterality: Left;    JOINT REPLACEMENT      Knee    KNEE ARTHROPLASTY Right 2019    Procedure: ARTHROPLASTY, KNEE;  Surgeon: Delio Chung MD;  Location: [facility] OR;  Service: Orthopedics;  Laterality: Right;  anesthesia:  general and block    REPLACEMENT OF WOUND VACUUM-ASSISTED CLOSURE DEVICE Right 2019    Procedure: REPLACEMENT, WOUND VAC;  Surgeon: Delio Chung MD;  Location: [facility] OR;  Service: Orthopedics;  Laterality: Right;    TONSILLECTOMY      TREATMENT OF CARDIAC ARRHYTHMIA N/A 2023    Procedure: Cardioversion or Defibrillation;  Surgeon: PJ Betancourt MD;  Location: [facility] EP LAB;  Service: Cardiology;  Laterality: N/A;  AF, MARIANO, DCCV, MAC, EH, 3 Prep    VENTRICULOGRAM, LEFT  2022    Procedure: Ventriculogram, Left;  Surgeon: Jorge Tran MD;  Location: [facility] CATH/EP LAB;  Service: Cardiology;;       Past Social History:  Social History     Socioeconomic History    Marital status:    Tobacco Use    Smoking status: Every Day     Current packs/day: 0.25     Average packs/day: 0.5 packs/day for 50.0 years (23.7 ttl pk-yrs)     Types: Cigarettes     Start date:      Passive exposure: Current    Smokeless tobacco: Never    Tobacco comments:      Pt states she is a 46 year smoker, smokes around 5-8 cigarettes per day. Interested in nicotine replacement while admitted. Information given on outpatient smoking cessation.   Substance and Sexual Activity    Alcohol use: Yes     Comment: RARELY    Drug use: Never    Sexual activity: Not Currently     Partners: Male     Social Drivers of Health     Financial Resource Strain: Patient Declined (12/8/2024)    Overall Financial Resource Strain (CARDIA)     Difficulty of Paying Living Expenses: Patient declined   Food Insecurity: Patient Declined (12/8/2024)    Hunger Vital Sign     Worried About Running Out of Food in the Last Year: Patient declined     Ran Out of Food in the Last Year: Patient declined   Transportation Needs: Patient Declined (12/8/2024)    TRANSPORTATION NEEDS     Transportation : Patient declined   Physical Activity: Patient Declined (12/23/2022)    Exercise Vital Sign     Days of Exercise per Week: Patient declined     Minutes of Exercise per Session: Patient declined   Stress: Patient Declined (12/8/2024)    [employer] - Occupational Stress Questionnaire     Feeling of Stress : Patient declined   Housing Stability: Patient Declined (12/8/2024)    Housing Stability Vital Sign     Unable to Pay for Housing in the Last Year: Patient declined     Homeless in the Last Year: Patient declined       Medications:  Scheduled Meds:   albuterol-ipratropium  3 mL Nebulization Q6H    amiodarone  200 mg Oral Daily    amLODIPine  10 mg Oral Daily    apixaban  5 mg Oral BID    aspirin  81 mg Oral Daily    atorvastatin  40 mg Oral QHS    cephALEXin  250 mg Oral Q6H    doxycycline  100 mg Oral Q12H    ferrous sulfate  1 tablet Oral Daily    furosemide  20 mg Oral Every Mon, Wed, Fri    hydrALAZINE  50 mg Oral Q8H    isosorbide dinitrate  20 mg Oral TID    magnesium oxide  400 mg Oral Daily    metoprolol succinate  50 mg Oral BID    mirtazapine  15 mg Oral QHS     Continuous  Infusions:  PRN Meds:.  Current Facility-Administered Medications:     acetaminophen, 650 mg, Oral, Q6H PRN    dextromethorphan-guaiFENesin  mg/5 ml, 15 mL, Oral, Q6H PRN    gabapentin, 300 mg, Oral, Nightly PRN    hydrALAZINE, 10 mg, Intravenous, Q4H PRN    HYDROcodone-acetaminophen, 1 tablet, Oral, Q6H PRN    magnesium oxide, 800 mg, Oral, PRN    magnesium oxide, 800 mg, Oral, PRN    ondansetron, 4 mg, Intravenous, Q6H PRN    potassium bicarbonate, 35 mEq, Oral, PRN    potassium bicarbonate, 50 mEq, Oral, PRN    potassium bicarbonate, 60 mEq, Oral, PRN    potassium, sodium phosphates, 2 packet, Oral, PRN    potassium, sodium phosphates, 2 packet, Oral, PRN    potassium, sodium phosphates, 2 packet, Oral, PRN  Current Facility-Administered Medications on File Prior to Encounter   Medication Dose Route Frequency Provider Last Rate Last Admin    lidocaine (PF) 10 mg/ml (1%) injection 5 mg  0.5 mL Intradermal Once Azeem Anderson MD         Current Outpatient Medications on File Prior to Encounter   Medication Sig Dispense Refill    amiodarone (PACERONE) 200 MG Tab Take 1 tablet (200 mg total) by mouth once daily. 30 tablet 11    apixaban (ELIQUIS) 5 mg Tab Take 5 mg by mouth 2 (two) times daily.      buPROPion (WELLBUTRIN SR) 150 MG TBSR 12 hr tablet Take 1 tablet (150 mg total) by mouth 2 (two) times daily. 180 tablet 3    FLUoxetine 40 MG capsule Take 1 capsule (40 mg total) by mouth once daily. 90 capsule 3    mirtazapine (REMERON) 15 MG tablet Take 1 tablet (15 mg total) by mouth every evening. For sleep 30 tablet 3    mupirocin (BACTROBAN) 2 % ointment Apply topically 2 (two) times daily. To infected skin tear 22 g 1    triamcinolone acetonide 0.1% (KENALOG) 0.1 % cream Apply topically 2 (two) times daily. 60 g 2       Allergies:  Patient has no known allergies.    Past Family History:  Reviewed; refer to Hospitalist Admission Note    Review of Systems:  Review of Systems - All 14 systems reviewed and  "negative, except as noted in HPI    Physical Exam:  BP (!) 146/86 (BP Location: Right forearm, Patient Position: Lying)   Pulse 100   Temp 97.8 °F (36.6 °C) (Oral)   Resp 20   Ht 5' 3" (1.6 m)   Wt 110.9 kg (244 lb 7.8 oz)   SpO2 (!) 94%   BMI 43.31 kg/m²     General Appearance:    NAD, AAO x 3, cooperative, appears stated age   Head:    Normocephalic, atraumatic   Eyes:    PER, EOMI, and conjunctiva/sclera clear bilaterally        Mouth:   Moist mucus membranes, no thrush or oral lesions, normal      dentition   Back:     No CVA tenderness   Lungs:     Clear to auscultation bilaterally, no wheeze, crackles, rales      or rhonchi, symmetric air movement, respirations unlabored   Heart:    Regular rate and rhythm, S1 and S2 normal, no murmur, rub   or gallop   Abdomen:     Soft, non-tender, non-distended, bowel sounds active all four   quadrants, no RT or guarding, no masses, no organomegaly   Extremities:   Warm and well perfused, distal pulses intact, no cyanosis or    peripheral edema   MSK:   No joint or muscle swelling, tenderness or deformity   Skin:   Skin color, texture, turgor normal, no rashes or lesions   Neurologic/Psychiatric:   CNII-XII intact, normal strength and sensation       throughout, no asterixis; normal affect, memory, judgement     and insight     Results:  Lab Results   Component Value Date     (L) 12/16/2024    K 3.7 12/16/2024     12/16/2024    CO2 27 12/16/2024    BUN 22 12/16/2024    CREATININE 1.7 (H) 12/16/2024    CALCIUM 8.5 (L) 12/16/2024    ANIONGAP 7 (L) 12/16/2024    ESTGFRAFRICA 52 (L) 04/25/2022    EGFRNONAA 45 (L) 04/25/2022       Lab Results   Component Value Date    CALCIUM 8.5 (L) 12/16/2024    PHOS 4.1 09/05/2019       Recent Labs   Lab 12/16/24  0442   WBC 6.86   RBC 2.84*   HGB 7.5*   HCT 25.5*      MCV 90   MCH 26.4*   MCHC 29.4*     I have spent >  minutes providing care for this patient for the above diagnoses. These services have included " chart/data/imaging review, evaluation, exam, formulation of plan, , note preparation, and discussions with staff involved in this patient's care.    Rg Danielle MD    [facility] [address] [address]  [phone] (p)  [phone] (f)

## 2024-12-16 NOTE — DISCHARGE SUMMARY
"[facility] Medicine  Discharge Summary      Patient Name: Iris Mueller  MRN: [id number]  BONIFACIO: [id number]  Patient Class: IP- Inpatient  Admission Date: 12/7/2024  Hospital Length of Stay: 8 days  Discharge Date and Time:  12/16/2024 10:35 AM  Attending Physician: Yazmin Jules MD   Discharging Provider: Yazmin Jules MD  Primary Care Provider: Elkin You MD    Primary Care Team: Networked reference to record PCT     HPI:   The patient is a 69-year-old  female with a history of hypertension, CKD-4, atrial fibrillation (on Eliquis), morbid obesity, COPD, hyperlipidemia, CAD, aortic stenosis/regurgitation, stroke, and history of systolic/diastolic congestive heart failure.  Her last echo performed in 2022 showed, per Dr. Wright:  ·   "The estimated ejection fraction is 40%. There is a anterior apical segment that is hypo to akinetic with a wall motion abnormality  · Grade II left ventricular diastolic dysfunction. Mean left atrial pressure 20 mm Hg. Mild mitral annular calcification  · There are segmental left ventricular wall motion abnormalities.  · Normal right ventricular size with normal right ventricular systolic function.  · Moderate left atrial enlargement.  · Moderate aortic regurgitation.  · There is moderate aortic valve stenosis.  · Aortic valve area is 1.55 cm2; peak velocity is 2.88 m/s; mean gradient is 15 mmHg.  · Moderate mitral regurgitation.  · There is mild pulmonary hypertension.  · Mild tricuspid regurgitation.  · Elevated central venous pressure (15 mmHg).  · The estimated PA systolic pressure is 47 mmHg."    Please note the obtaining history from the patient is impossible, given her current mental state.      The patient lives home with her  who has visual and hearing impediment.  Thus, the majority of the history is obtained from the ER physician, records, and 2 adult children at bedside.    Her children at bedside chair concern that the " "patient takes her prescribed narcotics inappropriately.  The patient was brought in tonight because of recurrent falls.  Over the last 2-3 weeks, the patient had complained that she was "off balance" and had fallen 7-8 times.    Around the 630 p.m., family worse talking to the patient on the phone apparently and knows that she had slurred speech and that she was "stuck on the Ottoman inked".  Somehow, the patient reached the neighbor and by the time the children got to the house she was sitting in her recliner (has been moved from the dining room chair, presumably with the assistance of the neighbor).  Thus she was conducted to the ER.      While here she was afebrile but hypertensive at 224/109 and this came down to 146/108.  NIH was 1.    Workup showed that head CT for stroke was negative.  She had multiple imaging studies including bilateral hip x-rays with pelvis, chest x-ray, left humerus, left shoulder, and CT cervical spine without contrast, all of which were unrevealing.    MRI brain without contrast showed (with an addenda, per radiologist Dr. Narvaez):    "Impression:     No acute ischemic process. Moderate to advanced periventricular and deep white matter changes of chronic microvascular ischemia. GRE sequence demonstrates punctate focus of susceptibility artifact in the right cerebellum and left basal ganglia representing microhemorrhage or calcification."    Furthermore, the patient had an MRI brain without contrast which showed, per radiologist Dr. Burnett:    Impression:     "Examination is significantly limited secondary to motion with apparent absence of flow related enhancement within the left middle cerebral artery branches, left vertebral artery, and the left posterior cerebral artery.  This is most likely artifactual.     CTA may be attempted for further evaluation."    She was evaluated by vascular Neurology and my understanding is that she was not considered an intervention candidate.  " Additionally, as her last known normal was not precisely known (as she is already fully anticoagulated with Eliquis), she was not considered to be a tenecteplase candidate, either.  She is admitted for further diagnosis, treatment, and care.    * No surgery found *      Hospital Course:   Patient is a 69 year old female with history of HTN, presented with recurrent falls, and slurred speech. Patient was found to be in hypertensive emergency on admission. CT C spine and head was negative. MRI brain was negative for acute findings. Showed punctate focus of susceptibility artifact in the right cerebellum and left basal ganglia representing microhemorrhage or calcification. Neurology was consulted. Patient was started on aspirin and Lipitor for possible TIA. Her home Eliquis was resumed. Patient was also started on PO Doxycycline for right lower extremity cellulitis. Nephrology consulted per family request for CKD IV. Patient continue to have episodes of confusion. Dementia work up sent. Patient worked with PT, OT and recommended SNF.  Mental status improved. PO Doxy to continue for 10 days total. Patient was discharged but didn't leave as the facility did not take her due to BP being variable.  Skilled nursing facility did not accept the patient.  Renal artery Doppler ultrasound confirmed right-sided renal artery stenosis for which vascular surgery is being consulted.  Patient was evaluated by cardiology team and vascular surgery who determined no need for any vascular intervention for current level of right renal artery stenosis.  Patient has declined to go to skilled nursing facility.  Patient agreeable for home health with home PT and OT arrangements.  Patient instructed to avoid use of nonsteroidal anti-inflammatory medications.  Patient will have a surveillance CBC and BMP checked next week by home health.  Patient encouraged to improve oral protein intake.  Patient to take medications as directed.  Patient to  continue close follow-up with primary care physician and cardiology team who will monitor patient closely and address patient's aortic valve stenosis management as outpatient.  Discharge plan of care reviewed with the patient who voiced understanding.    Goals of Care Treatment Preferences:  Code Status: Full Code      SDOH Screening:  The patient declined to be screened for utility difficulties, food insecurity, transport difficulties, housing insecurity, and interpersonal safety, so no concerns could be identified this admission.     Consults:   Consults (From admission, onward)          Status Ordering Provider     Inpatient consult to Vascular Surgery  Once        Provider:  Roxanne Marin MD    Completed DEMAR WALL     Inpatient consult to Cardiology  Once        Provider:  Urban Stearns MD    Completed MURRAY MARTINEZ     Inpatient consult to Nephrology  Once        Provider:  Demar Wall MD    Completed IRIS KOWALSKI     Inpatient consult to   Once        Provider:  (Not yet assigned)    Acknowledged NUSRAT GUTIERREZ     Inpatient consult to Neurology  Once        Provider:  Denton Noonan MD    Completed NUSRAT GUTIERREZ     Consult to Telemedicine - Acute Stroke  Once        Provider:  (Not yet assigned)    Acknowledged JUS WINSTON            * AMS (altered mental status)  Acute encephalopathy vs progressive dementia with delirium   MRI negative for acute stroke   Could be related to hypertensive encephalopathy however family states symptoms has been going on for about 2 months   normal B12, folate RPR and ammonia level   Improved on discharge   - Cont aspirin, Lipitor, Eliquis     Acute cystitis without hematuria  PO Keflex per sensitivity for 7 days       Cellulitis of right lower extremity  US DVT negative  PO Doxycycline D5, for total 10 days       Hypertensive emergency  With slurred speech and confusion     Home meds for hypertension were reviewed and  noted below.   Hypertension Medications               amLODIPine (NORVASC) 2.5 MG tablet Take 1 tablet (2.5 mg total) by mouth every evening.    metoprolol succinate (TOPROL-XL) 25 MG 24 hr tablet Take 1 tablet (25 mg total) by mouth once daily.          Norvasc increased to 10 mg  Cont metoprolol   Increase isosorbide dinitrate 20 mg p.o. t.i.d.  PO hydralazine and Lasix added     Tobacco dependency  Nicotine patch    Chronic combined systolic and diastolic heart failure  As above    Pulmonary hypertension    As above    Nonrheumatic aortic valve stenosis with regurgitation  Echo shows moderate to severe stenosis     Persistent atrial fibrillation  Resume home Amiodarone  Resume metoprolol increase to 50 mg   Per records, patient has a CHADS-VASc score of 3  On Eliquis     Morbid obesity  Body mass index is 43.31 kg/m².   Complicates all aspects of care        CKD (chronic kidney disease) stage 4, GFR 15-29 ml/min  Creatinine at baseline  Minimize nephrotoxic medications  Nephrology consulted per family request   Cr remained stable       Final Active Diagnoses:    Diagnosis Date Noted POA    PRINCIPAL PROBLEM:  AMS (altered mental status) [R41.82] 12/08/2024 Yes    Acute cystitis without hematuria [N30.00] 12/12/2024 Yes    Hypertensive emergency [I16.1] 12/09/2024 Yes    Cellulitis of right lower extremity [L03.115] 12/09/2024 Yes    Tobacco dependency [F17.200] 12/08/2024 Yes    Chronic combined systolic and diastolic heart failure [I50.42] 08/18/2023 Yes    Pulmonary hypertension [I27.20] 08/18/2023 Yes    Persistent atrial fibrillation [I48.19] 06/12/2023 Yes    Nonrheumatic aortic valve stenosis with regurgitation [I35.2] 06/12/2023 Yes    Morbid obesity [E66.01] 09/19/2019 Yes    CKD (chronic kidney disease) stage 4, GFR 15-29 ml/min [N18.4] 09/13/2019 Yes      Problems Resolved During this Admission:    Diagnosis Date Noted Date Resolved POA    CVA (cerebral vascular accident) [I63.9] 12/08/2024 12/09/2024  Yes    Nonischemic cardiomyopathy [I42.8] 06/12/2023 12/09/2024 Yes    Essential hypertension [I10] 08/14/2019 12/09/2024 Yes       Discharged Condition: good    Disposition: Skilled Nursing Facility    Follow Up:   Follow-up Information       Elkin You MD Follow up in 1 week(s).    Specialties: Family Medicine, Home Health Services, Hospice Services  Contact information:  [address]  [address]  [place] [address]  [phone]               Angelo Bond DO Follow up.    Specialty: Wound Care  Why: As needed wound care needs  Contact information:  [address]  [address]  [place] [address]  [phone]               Roxanne Marin MD Follow up.    Specialties: Vascular Surgery, Cardiology  Why: As needed  Contact information:  [address]  [address]  [place] [address]  [phone]               Jorge Tran MD Follow up in 2 week(s).    Specialties: Interventional Cardiology, Cardiology  Contact information:  [address]  [address]  [place] [address]  [phone]                           Patient Instructions:      Ambulatory referral/consult to Smoking Cessation Program   Standing Status: Future   Referral Priority: Routine Referral Type: Consultation   Referral Reason: Specialty Services Required   Requested Specialty: CTTS   Number of Visits Requested: 1     Ambulatory referral/consult to Home Health   Standing Status: Future   Referral Priority: Routine Referral Type: Home Health   Referral Reason: Specialty Services Required   Requested Specialty: Home Health Services   Number of Visits Requested: 1     Diet Cardiac   Order Comments: Patient to monitor daily weight, follow low salt diet and in case if gain more than 3 pounds in 24 hours, please call your doctor for further advice.    Boost Plus can with meals.     SUBSEQUENT HOME HEALTH ORDERS   Order Comments: Please resume all home health disciplines patient previously was enrolled in .     Order Specific Question Answer  "Comments   What Home Health Agency is the patient currently using? [facility]      Notify your health care provider if you experience any of the following:  temperature >100.4     Notify your health care provider if you experience any of the following:  persistent nausea and vomiting or diarrhea     Notify your health care provider if you experience any of the following:  severe uncontrolled pain     Notify your health care provider if you experience any of the following:  redness, tenderness, or signs of infection (pain, swelling, redness, odor or green/yellow discharge around incision site)     Notify your health care provider if you experience any of the following:  difficulty breathing or increased cough     Notify your health care provider if you experience any of the following:  severe persistent headache     Notify your health care provider if you experience any of the following:  worsening rash     Notify your health care provider if you experience any of the following:  persistent dizziness, light-headedness, or visual disturbances     Activity as tolerated   Order Comments: Fall precautions       Significant Diagnostic Studies: Labs: CMP   Recent Labs   Lab 12/15/24  0353 12/16/24 0442   * 135*   K 3.9 3.7    101   CO2 27 27   GLU 91 93   BUN 20 22   CREATININE 1.7* 1.7*   CALCIUM 8.4* 8.5*   PROT 5.5* 5.5*   ALBUMIN 3.2* 3.1*   BILITOT 0.6 0.6   ALKPHOS 123 117   AST 12 14   ALT 9* 11   ANIONGAP 6* 7*   , CBC   Recent Labs   Lab 12/15/24  0353 12/16/24  0442   WBC 6.84 6.86   HGB 7.5* 7.5*   HCT 25.9* 25.5*    233   , INR   Lab Results   Component Value Date    INR 1.1 12/07/2024    INR 1.1 08/28/2023   , Lipid Panel   Lab Results   Component Value Date    CHOL 100 (L) 12/07/2024    HDL 38 (L) 12/07/2024    LDLCALC 44.0 (L) 12/07/2024    TRIG 90 12/07/2024    CHOLHDL 38.0 12/07/2024   , and Troponin No results for input(s): "TROPONINI" in the last 168 hours.  Microbiology: " Blood Culture   Lab Results   Component Value Date    LABBLOO No growth after 5 days. 02/04/2024    and Urine Culture    Lab Results   Component Value Date    LABURIN ESCHERICHIA COLI  >100,000 cfu/ml   (A) 12/08/2024       Pending Diagnostic Studies:       None        ECHO:    Left Ventricle: The left ventricle is normal in size. Moderately increased wall thickness. Unable to assess wall motion. There is normal systolic function with a visually estimated ejection fraction of 55 - 60%. Unable to assess diastolic function due to atrial fibrillation.    Right Ventricle: Right ventricle was not well visualized due to poor acoustic window.    Left Atrium: Left atrium is severely dilated.    Right Atrium: Right atrium is severely dilated.    Aortic Valve: There is moderate to severe stenosis. Aortic valve area by VTI is 1.0 cm². Aortic valve peak velocity is 3.4 m/s. Mean gradient is 27.0 mmHg. The dimensionless index is 0.32. There is mild aortic regurgitation.    IVC/SVC: Elevated venous pressure at 15 mmHg.     Renal Artery US:   1.  Small echogenic left kidney compatible with chronic medical renal disease (with no hydronephrosis or shadowing stone in either kidney).  2.  Echogenic right kidney, with elevated resistive indices compatible with intrinsic medical renal disease.  3.  Bilateral simple renal cortical cysts.  4.  Relative elevated velocity and ratio in the right kidney suggesting a degree of renal artery stenosis on the right.     CT Head:  No CT evidence of acute intracranial abnormality. If the patient has an acute, focal neurological deficit, MRI of the brain may be indicated.  Generalized cerebral volume loss and chronic microvascular ischemic changes.     B/L hip x-rays: No acute findings.      CXR: No acute findings      Left humerus x-ray:  Osteopenia. No acute fracture or dislocation. Moderate glenohumeral osteoarthritis. Nonspecific calcifications project over the soft tissues of proximal arm.       Left shoulder x-ray: Osteopenia. No acute fracture or dislocation. Moderate to advanced glenohumeral and mild AC joint osteoarthritis. Nonspecific soft tissue calcification projects over the shoulder.      CT C-spine:  No acute trauma.      MRI Brain without contrast:  No acute ischemic process. Moderate to advanced periventricular and deep white matter changes of chronic microvascular ischemia. GRE sequence demonstrates punctate focus of susceptibility artifact in the right cerebellum and left basal ganglia representing microhemorrhage or calcification.      MRA Brain:  Examination is significantly limited secondary to motion with apparent absence of flow related enhancement within the left middle cerebral artery branches, left vertebral artery, and the left posterior cerebral artery.  This is most likely artifactual.  CTA may be attempted for further evaluation.     Bilateral lower extremity venous Doppler study:   No evidence of bilateral lower extremity DVT     Medications:  Reconciled Home Medications:      Medication List        START taking these medications      aspirin 81 MG Chew  Take 1 tablet (81 mg total) by mouth once daily.     cephALEXin 250 MG capsule  Commonly known as: KEFLEX  Take 1 capsule (250 mg total) by mouth 4 (four) times daily. for 7 days     doxycycline 100 MG Cap  Commonly known as: VIBRAMYCIN  Take 1 capsule (100 mg total) by mouth every 12 (twelve) hours. for 6 days     furosemide 20 MG tablet  Commonly known as: LASIX  Take 1 tablet (20 mg total) by mouth every Mon, Wed, Fri.     hydrALAZINE 25 MG tablet  Commonly known as: APRESOLINE  Take 1 tablet (25 mg total) by mouth every 12 (twelve) hours.     isosorbide dinitrate 20 MG tablet  Commonly known as: ISORDIL  Take 1 tablet (20 mg total) by mouth 3 (three) times daily.     magnesium oxide 400 mg (241.3 mg magnesium) tablet  Commonly known as: MAG-OX  Take 1 tablet (400 mg total) by mouth once daily.     nicotine 21 mg/24  hr  Commonly known as: NICODERM CQ  Place 1 patch onto the skin once daily. for 14 days            CHANGE how you take these medications      amLODIPine 2.5 MG tablet  Commonly known as: NORVASC  Take 4 tablets (10 mg total) by mouth once daily.  What changed:   how much to take  when to take this     atorvastatin 40 MG tablet  Commonly known as: LIPITOR  Take 1 tablet (40 mg total) by mouth every evening.  What changed:   medication strength  how much to take     gabapentin 300 MG capsule  Commonly known as: NEURONTIN  Take 1 capsule (300 mg total) by mouth nightly as needed (pain).  What changed:   when to take this  reasons to take this     HYDROcodone-acetaminophen 5-325 mg per tablet  Commonly known as: NORCO  Take 1 tablet by mouth every 6 (six) hours as needed for Pain.  What changed: Another medication with the same name was removed. Continue taking this medication, and follow the directions you see here.     metoprolol succinate 50 MG 24 hr tablet  Commonly known as: TOPROL-XL  Take 1 tablet (50 mg total) by mouth 2 (two) times daily.  What changed:   medication strength  how much to take  when to take this     mupirocin 2 % ointment  Commonly known as: BACTROBAN  Apply topically 2 (two) times daily. To infected skin tear  What changed: how much to take     triamcinolone acetonide 0.1% 0.1 % cream  Commonly known as: KENALOG  Apply topically 2 (two) times daily.  What changed: how much to take            CONTINUE taking these medications      amiodarone 200 MG Tab  Commonly known as: PACERONE  Take 1 tablet (200 mg total) by mouth once daily.     buPROPion 150 MG TBSR 12 hr tablet  Commonly known as: WELLBUTRIN SR  Take 1 tablet (150 mg total) by mouth 2 (two) times daily.     ELIQUIS 5 mg Tab  Generic drug: apixaban  Take 5 mg by mouth 2 (two) times daily.     FLUoxetine 40 MG capsule  Take 1 capsule (40 mg total) by mouth once daily.     mirtazapine 15 MG tablet  Commonly known as: REMERON  Take 1 tablet  (15 mg total) by mouth every evening. For sleep            STOP taking these medications      betamethasone dipropionate 0.05 % cream     loratadine 10 mg tablet  Commonly known as: CLARITIN     methocarbamoL 750 MG Tab  Commonly known as: ROBAXIN     traZODone 50 MG tablet  Commonly known as: DESYREL              Indwelling Lines/Drains at time of discharge:   Lines/Drains/Airways       Drain  Duration             Female External Urinary Catheter w/ Suction 12/09/24 0645 7 days                    Time spent on the discharge of patient: 34 minutes         Yazmin Jules MD  Department of Hospital Medicine  [facility]

## 2024-12-16 NOTE — PT/OT/SLP PROGRESS
Occupational Therapy   Treatment    Name: Iris Mueller  MRN: [id number]  Admitting Diagnosis:  AMS (altered mental status)       Recommendations:     Discharge Recommendations: Moderate Intensity Therapy  Discharge Equipment Recommendations:  to be determined by next level of care  Barriers to discharge:  Decreased caregiver support    Assessment:     Iris Mueller is a 69 y.o. female with a medical diagnosis of AMS (altered mental status).  She presents with good progress. Ox 4. Performance deficits affecting function are weakness, impaired endurance, impaired self care skills, impaired functional mobility, gait instability, impaired balance, decreased safety awareness, decreased lower extremity function, impaired cardiopulmonary response to activity.     Rehab Prognosis:  Good; patient would benefit from acute skilled OT services to address these deficits and reach maximum level of function.       Plan:     Patient to be seen 5 x/week to address the above listed problems via self-care/home management, therapeutic activities, therapeutic exercises  Plan of Care Expires: 01/08/25  Plan of Care Reviewed with: patient    Subjective     Chief Complaint: none  Patient/Family Comments/goals: pt agreeable to OT. I maybe leaving today.  Pain/Comfort:  Pain Rating 1: 0/10  Pain Rating Post-Intervention 1: 0/10    Objective:     Communicated with: nurse prior to session.  Patient found up in chair with telemetry, chair check upon OT entry to room.    General Precautions: Standard, fall    Orthopedic Precautions:N/A  Braces: N/A  Respiratory Status: Room air     Occupational Performance:     Functional Mobility/Transfers:  Patient completed Sit <> Stand Transfer with stand by assistance  with  rolling walker   Functional Mobility: ambulated 15 ft CGA w/ RW to sink and around bed. Unsteady, no LOB.    Activities of Daily Living:  Grooming: stand by assistance to wash hands standing at sink; walker off to  side( pt not able to fit inside RW)  Lower Body Dressing: supervision donned/doffed socks seated in BS chair  Toileting: pt declined need to use, stated she just used prior.        [facility] 6 Click ADL: 20    Treatment & Education:  Pt seen for safe self care  activities and fx mobility retraining as stated above.  All questions/concerns addressed within scope.  Call don't fall. Pt  acknowledged.  Purpose of OT and POC  Pt perform BUE AROM ex 10 x 2 sets shld flex/ext, hor bd/add, int/ext rotation, elbow flex/ext seated BS chair. Mild SOB. Instructed on PLB tech with cues for effective tech.    Patient left up in chair with     GOALS:   Multidisciplinary Problems       Occupational Therapy Goals          Problem: Occupational Therapy    Goal Priority Disciplines Outcome Interventions   Occupational Therapy Goal     OT, PT/OT Progressing    Description: Goals to be met by: 1/8/2024     Patient will increase functional independence with ADLs by performing:    UE Dressing with Modified [place].  LE Dressing with Supervision and Assistive Devices as needed.  Grooming while standing at sink with Supervision.  Toileting from bedside commode with Supervision for hygiene and clothing management.   Supine to sit with Modified [place].  Step transfer with Supervision  Toilet transfer to toilet with Supervision.                         Time Tracking:     OT Date of Treatment: 12/16/24  OT Start Time: 1009  OT Stop Time: 1023  OT Total Time (min): 14 min    Billable Minutes:Self Care/Home Management 14  Total Time 14    OT/EMI: OT          12/16/2024

## 2024-12-17 ENCOUNTER — CLINICAL SUPPORT (OUTPATIENT)
Dept: SMOKING CESSATION | Facility: CLINIC | Age: 69
End: 2024-12-17
Payer: COMMERCIAL

## 2024-12-17 VITALS
HEART RATE: 63 BPM | RESPIRATION RATE: 18 BRPM | HEIGHT: 63 IN | TEMPERATURE: 98 F | OXYGEN SATURATION: 96 % | SYSTOLIC BLOOD PRESSURE: 146 MMHG | DIASTOLIC BLOOD PRESSURE: 65 MMHG | BODY MASS INDEX: 43.32 KG/M2 | WEIGHT: 244.5 LBS

## 2024-12-17 DIAGNOSIS — F17.200 NICOTINE DEPENDENCE: Primary | ICD-10-CM

## 2024-12-17 LAB
ALBUMIN SERPL BCP-MCNC: 3.2 G/DL (ref 3.5–5.2)
ALP SERPL-CCNC: 121 U/L (ref 55–135)
ALT SERPL W/O P-5'-P-CCNC: 12 U/L (ref 10–44)
ANION GAP SERPL CALC-SCNC: 7 MMOL/L (ref 8–16)
AST SERPL-CCNC: 14 U/L (ref 10–40)
BASOPHILS # BLD AUTO: 0.03 K/UL (ref 0–0.2)
BASOPHILS NFR BLD: 0.5 % (ref 0–1.9)
BILIRUB SERPL-MCNC: 0.6 MG/DL (ref 0.1–1)
BUN SERPL-MCNC: 26 MG/DL (ref 8–23)
CALCIUM SERPL-MCNC: 8.7 MG/DL (ref 8.7–10.5)
CHLORIDE SERPL-SCNC: 100 MMOL/L (ref 95–110)
CO2 SERPL-SCNC: 28 MMOL/L (ref 23–29)
CREAT SERPL-MCNC: 1.8 MG/DL (ref 0.5–1.4)
DIFFERENTIAL METHOD BLD: ABNORMAL
EOSINOPHIL # BLD AUTO: 0.3 K/UL (ref 0–0.5)
EOSINOPHIL NFR BLD: 4.8 % (ref 0–8)
ERYTHROCYTE [DISTWIDTH] IN BLOOD BY AUTOMATED COUNT: 19.8 % (ref 11.5–14.5)
EST. GFR  (NO RACE VARIABLE): 29.8 ML/MIN/1.73 M^2
GLUCOSE SERPL-MCNC: 87 MG/DL (ref 70–110)
HCT VFR BLD AUTO: 27.1 % (ref 37–48.5)
HGB BLD-MCNC: 7.9 G/DL (ref 12–16)
IMM GRANULOCYTES # BLD AUTO: 0.04 K/UL (ref 0–0.04)
IMM GRANULOCYTES NFR BLD AUTO: 0.6 % (ref 0–0.5)
LYMPHOCYTES # BLD AUTO: 0.8 K/UL (ref 1–4.8)
LYMPHOCYTES NFR BLD: 12.3 % (ref 18–48)
MAGNESIUM SERPL-MCNC: 1.9 MG/DL (ref 1.6–2.6)
MCH RBC QN AUTO: 26.2 PG (ref 27–31)
MCHC RBC AUTO-ENTMCNC: 29.2 G/DL (ref 32–36)
MCV RBC AUTO: 90 FL (ref 82–98)
MONOCYTES # BLD AUTO: 0.6 K/UL (ref 0.3–1)
MONOCYTES NFR BLD: 8.6 % (ref 4–15)
NEUTROPHILS # BLD AUTO: 4.7 K/UL (ref 1.8–7.7)
NEUTROPHILS NFR BLD: 73.2 % (ref 38–73)
NRBC BLD-RTO: 0 /100 WBC
PLATELET # BLD AUTO: 243 K/UL (ref 150–450)
PMV BLD AUTO: 9.1 FL (ref 9.2–12.9)
POTASSIUM SERPL-SCNC: 3.9 MMOL/L (ref 3.5–5.1)
PROT SERPL-MCNC: 5.7 G/DL (ref 6–8.4)
RBC # BLD AUTO: 3.01 M/UL (ref 4–5.4)
SODIUM SERPL-SCNC: 135 MMOL/L (ref 136–145)
WBC # BLD AUTO: 6.41 K/UL (ref 3.9–12.7)

## 2024-12-17 PROCEDURE — 25000003 PHARM REV CODE 250: Performed by: INTERNAL MEDICINE

## 2024-12-17 PROCEDURE — 85025 COMPLETE CBC W/AUTO DIFF WBC: CPT | Performed by: INTERNAL MEDICINE

## 2024-12-17 PROCEDURE — 99900035 HC TECH TIME PER 15 MIN (STAT)

## 2024-12-17 PROCEDURE — 94761 N-INVAS EAR/PLS OXIMETRY MLT: CPT

## 2024-12-17 PROCEDURE — 94640 AIRWAY INHALATION TREATMENT: CPT

## 2024-12-17 PROCEDURE — 36415 COLL VENOUS BLD VENIPUNCTURE: CPT | Performed by: INTERNAL MEDICINE

## 2024-12-17 PROCEDURE — 99406 BEHAV CHNG SMOKING 3-10 MIN: CPT | Performed by: CLINIC/CENTER

## 2024-12-17 PROCEDURE — 94664 DEMO&/EVAL PT USE INHALER: CPT

## 2024-12-17 PROCEDURE — 99900031 HC PATIENT EDUCATION (STAT)

## 2024-12-17 PROCEDURE — 25000003 PHARM REV CODE 250: Performed by: NURSE PRACTITIONER

## 2024-12-17 PROCEDURE — 25000242 PHARM REV CODE 250 ALT 637 W/ HCPCS: Performed by: INTERNAL MEDICINE

## 2024-12-17 PROCEDURE — 83735 ASSAY OF MAGNESIUM: CPT | Performed by: INTERNAL MEDICINE

## 2024-12-17 PROCEDURE — 80053 COMPREHEN METABOLIC PANEL: CPT | Performed by: INTERNAL MEDICINE

## 2024-12-17 RX ORDER — HYDROCODONE BITARTRATE AND ACETAMINOPHEN 5; 325 MG/1; MG/1
1 TABLET ORAL EVERY 6 HOURS PRN
Qty: 10 TABLET | Refills: 0 | Status: SHIPPED | OUTPATIENT
Start: 2024-12-17

## 2024-12-17 RX ADMIN — HYDROCODONE BITARTRATE AND ACETAMINOPHEN 1 TABLET: 5; 325 TABLET ORAL at 01:12

## 2024-12-17 RX ADMIN — ASPIRIN 81 MG 81 MG: 81 TABLET ORAL at 08:12

## 2024-12-17 RX ADMIN — CEPHALEXIN 250 MG: 250 CAPSULE ORAL at 01:12

## 2024-12-17 RX ADMIN — IPRATROPIUM BROMIDE AND ALBUTEROL SULFATE 3 ML: .5; 3 SOLUTION RESPIRATORY (INHALATION) at 06:12

## 2024-12-17 RX ADMIN — Medication 400 MG: at 08:12

## 2024-12-17 RX ADMIN — AMIODARONE HYDROCHLORIDE 200 MG: 200 TABLET ORAL at 08:12

## 2024-12-17 RX ADMIN — IPRATROPIUM BROMIDE AND ALBUTEROL SULFATE 3 ML: .5; 3 SOLUTION RESPIRATORY (INHALATION) at 12:12

## 2024-12-17 RX ADMIN — IPRATROPIUM BROMIDE AND ALBUTEROL SULFATE 3 ML: .5; 3 SOLUTION RESPIRATORY (INHALATION) at 01:12

## 2024-12-17 RX ADMIN — FERROUS SULFATE TAB 325 MG (65 MG ELEMENTAL FE) 1 EACH: 325 (65 FE) TAB at 08:12

## 2024-12-17 RX ADMIN — APIXABAN 5 MG: 5 TABLET, FILM COATED ORAL at 08:12

## 2024-12-17 RX ADMIN — DOXYCYCLINE HYCLATE 100 MG: 100 CAPSULE ORAL at 08:12

## 2024-12-17 RX ADMIN — METOPROLOL SUCCINATE 50 MG: 50 TABLET, FILM COATED, EXTENDED RELEASE ORAL at 08:12

## 2024-12-17 RX ADMIN — ISOSORBIDE DINITRATE 20 MG: 10 TABLET ORAL at 08:12

## 2024-12-17 RX ADMIN — CEPHALEXIN 250 MG: 250 CAPSULE ORAL at 05:12

## 2024-12-17 RX ADMIN — CEPHALEXIN 250 MG: 250 CAPSULE ORAL at 12:12

## 2024-12-17 NOTE — PLAN OF CARE
12/16/24 8381   Patient Assessment/Suction   Level of Consciousness (AVPU) alert   Respiratory Effort Normal;Unlabored   Expansion/Accessory Muscles/Retractions no use of accessory muscles   All Lung Fields Breath Sounds Anterior:;Lateral:;clear   Rhythm/Pattern, Respiratory pattern regular   Cough Frequency no cough   PRE-TX-O2   Device (Oxygen Therapy) room air   SpO2 (!) 94 %   Pulse Oximetry Type Intermittent   $ Pulse Oximetry - Multiple Charge Pulse Oximetry - Multiple   Pulse 76   Resp 19   Aerosol Therapy   $ Aerosol Therapy Charges Aerosol Treatment  (duoneb)   Daily Review of Necessity (SVN) completed   Respiratory Treatment Status (SVN) given   Treatment Route (SVN) mask;oxygen   Patient Position sitting in chair   Post Treatment Assessment (SVN) breath sounds unchanged   Signs of Intolerance (SVN) none   Breath Sounds Post-Respiratory Treatment   Throughout All Fields Post-Treatment All Fields   Throughout All Fields Post-Treatment no change   Post-treatment Heart Rate (beats/min) 79   Post-treatment Resp Rate (breaths/min) 19   Respiratory Interventions   Cough And Deep Breathing done with encouragement   Education   $ Education Bronchodilator;15 min

## 2024-12-17 NOTE — PLAN OF CARE
KINGA called [facility] ([phone]) and spoke with Ramonita. Per Ramonita auth for  SNF has been approved. Approval number is: [id number]. Penny with  notified.      12/17/24 0829   Post-Acute Status   Post-Acute Authorization Placement   Post-Acute Placement Status Pending payor review/awaiting authorization (if required)   Discharge Plan   Discharge Plan A Skilled Nursing Facility   Discharge Plan B Skilled Nursing Facility     0842- KINGA sent updated clinicals including PPD, Chest Xray, PASRR, and Locet to .     4735- SW sent updated AVS to  via Right fax.

## 2024-12-17 NOTE — PLAN OF CARE
Charts and orders reviewed. Pt to discharge [facility] for SNF.  gave pt's floor nurse information to call report. Pt has no other needs to be addressed by case management. Pt cleared to discharge from case management standpoint.     will be transporting pt from [facility] to .     12/17/24 1315   Final Note   Assessment Type Final Discharge Note   Anticipated Discharge Disposition SNF   What phone number can be called within the next 1-3 days to see how you are doing after discharge? [phone]   Hospital Resources/Appts/Education Provided Post-Acute resouces added to AVS   Post-Acute Status   Post-Acute Authorization Placement   Post-Acute Placement Status Set-up Complete/Auth obtained   [facility]   Patient choice form signed by patient/caregiver List with quality metrics by geographic area provided   Discharge Delays None known at this time

## 2024-12-17 NOTE — PT/OT/SLP PROGRESS
Physical Therapy      Patient Name:  Iris Mueller   MRN:  [id number]    Patient not seen today secondary to Other (Comment) (Plan changed since yesterday due to acceptance to [facility]. Pt prepping for discharge to SNF.). Will follow-up 12/18/24 if not discharged as anticipated  .

## 2024-12-17 NOTE — PLAN OF CARE
SNF referrals sent out- pending acceptance and insurance auth.        12/17/24 0832   Discharge Reassessment   Assessment Type Discharge Planning Reassessment   Did the patient's condition or plan change since previous assessment? Yes

## 2024-12-17 NOTE — PROGRESS NOTES
12/17/24 0900   Tobacco Cessation Intervention   Do you use any type of tobacco product? Yes   Are you interested in quitting use of tobacco products? Ready to quit   Are you interested in Nicotine Replacement for symptom relief? Yes   $ Smoking Cessation Charges Smoking Cessation - Intermediate (CTTS)

## 2024-12-17 NOTE — NURSING
Patients IV removed. Monitor removed and returned. Discharge report given to facility, discharge paper work given to facility . Patients family at bedside and aware of transfer. Family gathered belongings to bring to facility. Patient discharged from hospital.

## 2024-12-17 NOTE — NURSING
Report given to [facility], awaiting transportation to arrive for discharge. Patient aware. Patient notified family.

## 2024-12-17 NOTE — PROGRESS NOTES
INPATIENT NEPHROLOGY Progress Note  [facility]    Patient Name: Iris Mueller  Date: 12/17/2024    Reason for consultation: CKD IV    Chief Complaint:   Chief Complaint   Patient presents with    Headache     Fell twice today , slurred speech started at 1830       History of Present Illness:  Patient is a 69 year old female with history of HTN, presented with recurrent falls, and slurred speech. Patient was found to be in hypertensive emergency on admission. CT C spine and head was negative. MRI brain was negative for acute findings. Showed punctate focus of susceptibility artifact in the right cerebellum and left basal ganglia representing microhemorrhage or calcification. Neurology was consulted. Patient was started on aspirin and Lipitor for possible TIA. Her home Eliquis was resumed. Patient was also started on PO Doxycycline for right lower extremity cellulitis. Nephrology consulted per family request for CKD IV. Patient continue to have episodes of confusion. Dementia work up sent. Patient worked with PT, OT and recommended SNF.     Interval History:  12/9- work on BP control- then edema management  12/10  975 ml UOP recorded.  BP starting to improve--still high at times.  Na+ improved.  Renal function stable.  Sleepy, is able to converse some, though.  12/11  one shift  ml.  BP meds adjusted yesterday, BP still high.  renal function stable.  Hydralazine and lasix added today.  Pt up w/OT, feeling better.  12/12  One shift UOP recorded, 600 ml.  Still w/BP spikes, hydralazine adjusted.  Renal function stable.  Sleepy, awakens easily.   12/13 HR > 90, BP trends improved, on RA   12/14  one shift UOP recorded, 500 ml.  Pressures still high, hydralazine adjusted yest, metoprolol adjusted today.  Isordil added.  US in progress.  12/15  UOP 1L, BP started looking a bit better yesterday afternoon, only one reading today--141/81.  Renal function stable.  Renal duplex w/medical  renal dz and a degree of DANNIELLE on right.  No new complaints.  12/16 VSS. Appreciate input from Vascular and Cards, no need for angiogram, ok to go to rehab and we will follow there...  12/17  planning d/c to SNF per CM notes.  Renal function stable.  Some hypotensive readings noted.  Up in chair, no complaints.    Plan of Care:    CKD IV  HTN emerg/TIA  Edema  Tachycardia  Hyponatremia  Secondary HPT  Anemia of CKD  Hypomagnesemia    Plan:    - renal function at baseline- improving over last few days  - not uremic- mental status issues combo of underlying age related and vascular dementia chronically and HTN emerg causing TIA acutely  - agree with norvasc, started hydralazine 12/11, adjusted 12/12  - continue lasix MWF or per Cards  - adjusted metoprolol, hydralazine. Renal duplex done, see by Dr. Marin, no need for angiogram.  - 2g salt, 1.5L fluid per day as tolerated  - no active BMM issues  - supplement iron- DENEEN 10K TIW while in hospital, off during rehab, then resume as outpatient if needed, may need blood transfusion if not responding  - IV Mg as needed- added MgOH 400mg     Thank you for allowing us to participate in this patient's care. We will continue to follow.    Vital Signs:  Temp Readings from Last 3 Encounters:   12/17/24 97.9 °F (36.6 °C) (Oral)   02/12/24 97.8 °F (36.6 °C) (Oral)   01/12/24 97.6 °F (36.4 °C) (Oral)       Pulse Readings from Last 3 Encounters:   12/17/24 78   10/30/24 89   09/10/24 92       BP Readings from Last 3 Encounters:   12/17/24 110/82   10/30/24 130/80   09/10/24 132/82       Weight:  Wt Readings from Last 3 Encounters:   12/13/24 110.9 kg (244 lb 7.8 oz)   12/08/24 106 kg (233 lb 11 oz)   10/30/24 96.2 kg (212 lb)       INS/OUTS:  I/O last 3 completed shifts:  In: 480 [P.O.:480]  Out: 1200 [Urine:1200]  I/O this shift:  In: 120 [P.O.:120]  Out: -     Past Medical & Surgical History:  Past Medical History:   Diagnosis Date    Anemia, unspecified     Arthritis     Asthma      COPD (chronic obstructive pulmonary disease)     Encounter for blood transfusion     Hypertension     Obese body habitus     Wound infection 2019    Right knee       Past Surgical History:   Procedure Laterality Date    ANGIOGRAM, CORONARY, WITH LEFT HEART CATHETERIZATION N/A 2022    Procedure: Angiogram, Coronary, with Left Heart Cath;  Surgeon: Jorge Tran MD;  Location: [facility] CATH/EP LAB;  Service: Cardiology;  Laterality: N/A;     SECTION      ECHOCARDIOGRAM,TRANSESOPHAGEAL N/A 2023    Procedure: Transesophageal echo (MARIANO) intra-procedure log documentation;  Surgeon: Provider, [facility];  Location: [facility] EP LAB;  Service: Cardiology;  Laterality: N/A;    INCISION AND DRAINAGE OF HEMATOMA Left 2024    Procedure: INCISION AND DRAINAGE, HEMATOMA;  Surgeon: Bryson Huerta MD;  Location: [facility] OR;  Service: Orthopedics;  Laterality: Left;    JOINT REPLACEMENT      Knee    KNEE ARTHROPLASTY Right 2019    Procedure: ARTHROPLASTY, KNEE;  Surgeon: Delio Chung MD;  Location: [facility] OR;  Service: Orthopedics;  Laterality: Right;  anesthesia:  general and block    REPLACEMENT OF WOUND VACUUM-ASSISTED CLOSURE DEVICE Right 2019    Procedure: REPLACEMENT, WOUND VAC;  Surgeon: Delio Chung MD;  Location: [facility] OR;  Service: Orthopedics;  Laterality: Right;    TONSILLECTOMY      TREATMENT OF CARDIAC ARRHYTHMIA N/A 2023    Procedure: Cardioversion or Defibrillation;  Surgeon: PJ Betancourt MD;  Location: [facility] EP LAB;  Service: Cardiology;  Laterality: N/A;  AF, MARIANO, DCCV, MAC, EH, 3 Prep    VENTRICULOGRAM, LEFT  2022    Procedure: Ventriculogram, Left;  Surgeon: Jorge Tran MD;  Location: [facility] CATH/EP LAB;  Service: Cardiology;;       Past Social History:  Social History     Socioeconomic History    Marital status:    Tobacco Use    Smoking status: Every Day     Current packs/day: 0.25     Average packs/day: 0.5 packs/day for 50.0 years (23.7  ttl pk-yrs)     Types: Cigarettes     Start date: 1975     Passive exposure: Current    Smokeless tobacco: Never    Tobacco comments:     Pt states she is a 46 year smoker, smokes around 5-8 cigarettes per day. Interested in nicotine replacement while admitted. Information given on outpatient smoking cessation.   Substance and Sexual Activity    Alcohol use: Yes     Comment: RARELY    Drug use: Never    Sexual activity: Not Currently     Partners: Male     Social Drivers of Health     Financial Resource Strain: Patient Declined (12/8/2024)    Overall Financial Resource Strain (CARDIA)     Difficulty of Paying Living Expenses: Patient declined   Food Insecurity: Patient Declined (12/8/2024)    Hunger Vital Sign     Worried About Running Out of Food in the Last Year: Patient declined     Ran Out of Food in the Last Year: Patient declined   Transportation Needs: Patient Declined (12/8/2024)    TRANSPORTATION NEEDS     Transportation : Patient declined   Physical Activity: Patient Declined (12/23/2022)    Exercise Vital Sign     Days of Exercise per Week: Patient declined     Minutes of Exercise per Session: Patient declined   Stress: Patient Declined (12/8/2024)    [place] citizen [employer] - Occupational Stress Questionnaire     Feeling of Stress : Patient declined   Housing Stability: Patient Declined (12/8/2024)    Housing Stability Vital Sign     Unable to Pay for Housing in the Last Year: Patient declined     Homeless in the Last Year: Patient declined       Medications:  Scheduled Meds:   albuterol-ipratropium  3 mL Nebulization Q6H    amiodarone  200 mg Oral Daily    amLODIPine  10 mg Oral Daily    apixaban  5 mg Oral BID    aspirin  81 mg Oral Daily    atorvastatin  40 mg Oral QHS    cephALEXin  250 mg Oral Q6H    doxycycline  100 mg Oral Q12H    ferrous sulfate  1 tablet Oral Daily    furosemide  20 mg Oral Every Mon, Wed, Fri    hydrALAZINE  50 mg Oral Q8H    isosorbide dinitrate  20 mg Oral  TID    magnesium oxide  400 mg Oral Daily    metoprolol succinate  50 mg Oral BID    mirtazapine  15 mg Oral QHS     Continuous Infusions:  PRN Meds:.  Current Facility-Administered Medications:     acetaminophen, 650 mg, Oral, Q6H PRN    dextromethorphan-guaiFENesin  mg/5 ml, 15 mL, Oral, Q6H PRN    gabapentin, 300 mg, Oral, Nightly PRN    hydrALAZINE, 10 mg, Intravenous, Q4H PRN    HYDROcodone-acetaminophen, 1 tablet, Oral, Q6H PRN    magnesium oxide, 800 mg, Oral, PRN    magnesium oxide, 800 mg, Oral, PRN    ondansetron, 4 mg, Intravenous, Q6H PRN    potassium bicarbonate, 35 mEq, Oral, PRN    potassium bicarbonate, 50 mEq, Oral, PRN    potassium bicarbonate, 60 mEq, Oral, PRN    potassium, sodium phosphates, 2 packet, Oral, PRN    potassium, sodium phosphates, 2 packet, Oral, PRN    potassium, sodium phosphates, 2 packet, Oral, PRN  Current Facility-Administered Medications on File Prior to Encounter   Medication Dose Route Frequency Provider Last Rate Last Admin    lidocaine (PF) 10 mg/ml (1%) injection 5 mg  0.5 mL Intradermal Once Azeem Anderson MD         Current Outpatient Medications on File Prior to Encounter   Medication Sig Dispense Refill    amiodarone (PACERONE) 200 MG Tab Take 1 tablet (200 mg total) by mouth once daily. 30 tablet 11    apixaban (ELIQUIS) 5 mg Tab Take 5 mg by mouth 2 (two) times daily.      buPROPion (WELLBUTRIN SR) 150 MG TBSR 12 hr tablet Take 1 tablet (150 mg total) by mouth 2 (two) times daily. 180 tablet 3    FLUoxetine 40 MG capsule Take 1 capsule (40 mg total) by mouth once daily. 90 capsule 3    mirtazapine (REMERON) 15 MG tablet Take 1 tablet (15 mg total) by mouth every evening. For sleep 30 tablet 3    mupirocin (BACTROBAN) 2 % ointment Apply topically 2 (two) times daily. To infected skin tear 22 g 1    triamcinolone acetonide 0.1% (KENALOG) 0.1 % cream Apply topically 2 (two) times daily. 60 g 2       Allergies:  Patient has no known allergies.    Past Family  "History:  Reviewed; refer to Hospitalist Admission Note    Review of Systems:  Review of Systems - All 14 systems reviewed and negative, except as noted in HPI    Physical Exam:  /82 (BP Location: Left forearm, Patient Position: Lying)   Pulse 78   Temp 97.9 °F (36.6 °C) (Oral)   Resp 16   Ht 5' 3" (1.6 m)   Wt 110.9 kg (244 lb 7.8 oz)   SpO2 99%   BMI 43.31 kg/m²     General Appearance:    NAD, AAO x 3, cooperative, appears stated age   Head:    Normocephalic, atraumatic   Eyes:    PER, EOMI, and conjunctiva/sclera clear bilaterally        Mouth:   Moist mucus membranes, no thrush or oral lesions, normal      dentition   Back:     No CVA tenderness   Lungs:     Clear to auscultation bilaterally, no wheeze, crackles, rales      or rhonchi, symmetric air movement, respirations unlabored   Heart:    Regular rate and rhythm, S1 and S2 normal, no murmur, rub   or gallop   Abdomen:     Soft, non-tender, non-distended, bowel sounds active all four   quadrants, no RT or guarding, no masses, no organomegaly   Extremities:   Warm and well perfused, distal pulses intact, no cyanosis or    peripheral edema   MSK:   No joint or muscle swelling, tenderness or deformity   Skin:   Skin color, texture, turgor normal, no rashes or lesions   Neurologic/Psychiatric:   CNII-XII intact, normal strength and sensation       throughout, no asterixis; normal affect, memory, judgement     and insight     Results:  Lab Results   Component Value Date     (L) 12/17/2024    K 3.9 12/17/2024     12/17/2024    CO2 28 12/17/2024    BUN 26 (H) 12/17/2024    CREATININE 1.8 (H) 12/17/2024    CALCIUM 8.7 12/17/2024    ANIONGAP 7 (L) 12/17/2024    ESTGFRAFRICA 52 (L) 04/25/2022    EGFRNONAA 45 (L) 04/25/2022       Lab Results   Component Value Date    CALCIUM 8.7 12/17/2024    PHOS 4.1 09/05/2019       Recent Labs   Lab 12/17/24  0432   WBC 6.41   RBC 3.01*   HGB 7.9*   HCT 27.1*      MCV 90   MCH 26.2*   MCHC 29.2*     I " have spent >  minutes providing care for this patient for the above diagnoses. These services have included chart/data/imaging review, evaluation, exam, formulation of plan, , note preparation, and discussions with staff involved in this patient's care.    Cristina Hahn NP    [facility] [address] [address]  [phone] (p)  [phone] (f)

## 2024-12-17 NOTE — DISCHARGE SUMMARY
"[facility] Medicine  Discharge Summary      Patient Name: Iris Mueller  MRN: [id number]  BONIFACIO: [id number]  Patient Class: IP- Inpatient  Admission Date: 12/7/2024  Hospital Length of Stay: 9 days  Discharge Date and Time:  12/17/2024 10:35 AM  Attending Physician: Yazmin Jules MD   Discharging Provider: Yazmin Jules MD  Primary Care Provider: Elkin You MD    Primary Care Team: Networked reference to record PCT     HPI:   The patient is a 69-year-old  female with a history of hypertension, CKD-4, atrial fibrillation (on Eliquis), morbid obesity, COPD, hyperlipidemia, CAD, aortic stenosis/regurgitation, stroke, and history of systolic/diastolic congestive heart failure.  Her last echo performed in 2022 showed, per Dr. Wright:  ·   "The estimated ejection fraction is 40%. There is a anterior apical segment that is hypo to akinetic with a wall motion abnormality  · Grade II left ventricular diastolic dysfunction. Mean left atrial pressure 20 mm Hg. Mild mitral annular calcification  · There are segmental left ventricular wall motion abnormalities.  · Normal right ventricular size with normal right ventricular systolic function.  · Moderate left atrial enlargement.  · Moderate aortic regurgitation.  · There is moderate aortic valve stenosis.  · Aortic valve area is 1.55 cm2; peak velocity is 2.88 m/s; mean gradient is 15 mmHg.  · Moderate mitral regurgitation.  · There is mild pulmonary hypertension.  · Mild tricuspid regurgitation.  · Elevated central venous pressure (15 mmHg).  · The estimated PA systolic pressure is 47 mmHg."    Please note the obtaining history from the patient is impossible, given her current mental state.      The patient lives home with her  who has visual and hearing impediment.  Thus, the majority of the history is obtained from the ER physician, records, and 2 adult children at bedside.    Her children at bedside chair concern that the " "patient takes her prescribed narcotics inappropriately.  The patient was brought in tonight because of recurrent falls.  Over the last 2-3 weeks, the patient had complained that she was "off balance" and had fallen 7-8 times.    Around the 630 p.m., family worse talking to the patient on the phone apparently and knows that she had slurred speech and that she was "stuck on the Ottoman inked".  Somehow, the patient reached the neighbor and by the time the children got to the house she was sitting in her recliner (has been moved from the dining room chair, presumably with the assistance of the neighbor).  Thus she was conducted to the ER.      While here she was afebrile but hypertensive at 224/109 and this came down to 146/108.  NIH was 1.    Workup showed that head CT for stroke was negative.  She had multiple imaging studies including bilateral hip x-rays with pelvis, chest x-ray, left humerus, left shoulder, and CT cervical spine without contrast, all of which were unrevealing.    MRI brain without contrast showed (with an addenda, per radiologist Dr. Narvaez):    "Impression:     No acute ischemic process. Moderate to advanced periventricular and deep white matter changes of chronic microvascular ischemia. GRE sequence demonstrates punctate focus of susceptibility artifact in the right cerebellum and left basal ganglia representing microhemorrhage or calcification."    Furthermore, the patient had an MRI brain without contrast which showed, per radiologist Dr. Burnett:    Impression:     "Examination is significantly limited secondary to motion with apparent absence of flow related enhancement within the left middle cerebral artery branches, left vertebral artery, and the left posterior cerebral artery.  This is most likely artifactual.     CTA may be attempted for further evaluation."    She was evaluated by vascular Neurology and my understanding is that she was not considered an intervention candidate.  " Additionally, as her last known normal was not precisely known (as she is already fully anticoagulated with Eliquis), she was not considered to be a tenecteplase candidate, either.  She is admitted for further diagnosis, treatment, and care.    * No surgery found *      Hospital Course:   Patient is a 69 year old female with history of HTN, presented with recurrent falls, and slurred speech. Patient was found to be in hypertensive emergency on admission. CT C spine and head was negative. MRI brain was negative for acute findings. Showed punctate focus of susceptibility artifact in the right cerebellum and left basal ganglia representing microhemorrhage or calcification. Neurology was consulted. Patient was started on aspirin and Lipitor for possible TIA. Her home Eliquis was resumed. Patient was also started on PO Doxycycline for right lower extremity cellulitis. Nephrology consulted per family request for CKD IV. Patient continue to have episodes of confusion. Dementia work up sent. Patient worked with PT, OT and recommended SNF.  Mental status improved. PO Doxy to continue for 10 days total. Patient was discharged but didn't leave as the facility did not take her due to BP being variable.  Skilled nursing facility did not accept the patient.  Renal artery Doppler ultrasound confirmed right-sided renal artery stenosis for which vascular surgery is being consulted.  Patient was evaluated by cardiology team and vascular surgery who determined no need for any vascular intervention for current level of right renal artery stenosis.  Patient has declined to go to skilled nursing facility.  Patient agreeable for home health with home PT and OT arrangements.  Patient instructed to avoid use of nonsteroidal anti-inflammatory medications.  Patient has been accepted at a local skilled nursing facility for further management and care.  Patient encouraged to improve oral protein intake.  Patient to take medications as directed.   Patient to continue close follow-up with primary care physician and cardiology team who will monitor patient closely and address patient's aortic valve stenosis management as outpatient.  Discharge plan of care reviewed with the patient who voiced understanding.    Goals of Care Treatment Preferences:  Code Status: Full Code      SDOH Screening:  The patient declined to be screened for utility difficulties, food insecurity, transport difficulties, housing insecurity, and interpersonal safety, so no concerns could be identified this admission.     Consults:   Consults (From admission, onward)          Status Ordering Provider     Inpatient consult to Midline team  Once        Provider:  (Not yet assigned)    Completed SULTAN, AQIB     Inpatient consult to   Once        Provider:  (Not yet assigned)    Acknowledged SULMORRO AQIB     Inpatient consult to Vascular Surgery  Once        Provider:  Roxanne Marin MD    Completed DEMAR WALL     Inpatient consult to Cardiology  Once        Provider:  Urban Stearns MD    Completed SULTAN, AQCHANEL     Inpatient consult to Nephrology  Once        Provider:  Demar Wall MD    Completed IRIS KOWALSKI     Inpatient consult to   Once        Provider:  (Not yet assigned)    Completed NUSRAT GUTIERREZ     Inpatient consult to Neurology  Once        Provider:  Denton Noonan MD    Completed NUSRAT GUTIERREZ     Consult to Telemedicine - Acute Stroke  Once        Provider:  (Not yet assigned)    Acknowledged JUS WINSTON            * AMS (altered mental status)  Acute encephalopathy vs progressive dementia with delirium   MRI negative for acute stroke   Could be related to hypertensive encephalopathy however family states symptoms has been going on for about 2 months   normal B12, folate RPR and ammonia level   Improved on discharge   - Cont aspirin, Lipitor, Eliquis     Acute cystitis without hematuria  PO Keflex per  sensitivity for 7 days       Cellulitis of right lower extremity  US DVT negative  PO Doxycycline D5, for total 10 days       Hypertensive emergency  With slurred speech and confusion     Home meds for hypertension were reviewed and noted below.   Hypertension Medications               amLODIPine (NORVASC) 2.5 MG tablet Take 1 tablet (2.5 mg total) by mouth every evening.    metoprolol succinate (TOPROL-XL) 25 MG 24 hr tablet Take 1 tablet (25 mg total) by mouth once daily.          Norvasc increased to 10 mg  Cont metoprolol   Increase isosorbide dinitrate 20 mg p.o. t.i.d.  PO hydralazine and Lasix added     Tobacco dependency  Nicotine patch    Chronic combined systolic and diastolic heart failure  As above    Pulmonary hypertension    As above    Nonrheumatic aortic valve stenosis with regurgitation  Echo shows moderate to severe stenosis     Persistent atrial fibrillation  Resume home Amiodarone  Resume metoprolol increase to 50 mg   Per records, patient has a CHADS-VASc score of 3  On Eliquis     Morbid obesity  Body mass index is 43.31 kg/m².   Complicates all aspects of care        CKD (chronic kidney disease) stage 4, GFR 15-29 ml/min  Creatinine at baseline  Minimize nephrotoxic medications  Nephrology consulted per family request   Cr remained stable       Final Active Diagnoses:    Diagnosis Date Noted POA    PRINCIPAL PROBLEM:  AMS (altered mental status) [R41.82] 12/08/2024 Yes    Acute cystitis without hematuria [N30.00] 12/12/2024 Yes    Hypertensive emergency [I16.1] 12/09/2024 Yes    Cellulitis of right lower extremity [L03.115] 12/09/2024 Yes    Tobacco dependency [F17.200] 12/08/2024 Yes    Chronic combined systolic and diastolic heart failure [I50.42] 08/18/2023 Yes    Pulmonary hypertension [I27.20] 08/18/2023 Yes    Persistent atrial fibrillation [I48.19] 06/12/2023 Yes    Nonrheumatic aortic valve stenosis with regurgitation [I35.2] 06/12/2023 Yes    Morbid obesity [E66.01] 09/19/2019 Yes     CKD (chronic kidney disease) stage 4, GFR 15-29 ml/min [N18.4] 09/13/2019 Yes      Problems Resolved During this Admission:    Diagnosis Date Noted Date Resolved POA    CVA (cerebral vascular accident) [I63.9] 12/08/2024 12/09/2024 Yes    Nonischemic cardiomyopathy [I42.8] 06/12/2023 12/09/2024 Yes    Essential hypertension [I10] 08/14/2019 12/09/2024 Yes       Discharged Condition: good    Disposition: Skilled Nursing Facility    Follow Up:   Follow-up Information       Elkin You MD Follow up in 1 week(s).    Specialties: Family Medicine, Home Health Services, Hospice Services  Why: Dr. George office will call you to schedule appointment.  Contact information:  [address]  [address]  [place] [address]  [phone]               Angelo Bond,  Follow up.    Specialty: Wound Care  Why: As needed wound care needs  Contact information:  [address]  [address]  [place] [address]  [phone]               Roxanne Marin MD Follow up.    Specialties: Vascular Surgery, Cardiology  Why: As needed  Contact information:  [address]  [address]  [place] [address]  [phone]               Jorge Tran MD Follow up in 2 week(s).    Specialties: Interventional Cardiology, Cardiology  Why: inbasket message sent to Dr. Yuen office for a follow up- they will call you.  Contact information:  [address]  [address]  [place] [address]  [phone]                           Patient Instructions:      Ambulatory referral/consult to Smoking Cessation Program   Standing Status: Future   Referral Priority: Routine Referral Type: Consultation   Referral Reason: Specialty Services Required   Requested Specialty: CTTS   Number of Visits Requested: 1     Diet Cardiac   Order Comments: Patient to monitor daily weight, follow low salt diet and in case if gain more than 3 pounds in 24 hours, please call your doctor for further advice.    Bo[employer] Plus can with meals.     SUBSEQUENT HOME HEALTH ORDERS    Order Comments: Please resume all home health disciplines patient previously was enrolled in .     Order Specific Question Answer Comments   What Home Health Agency is the patient currently using? [facility]      Notify your health care provider if you experience any of the following:  temperature >100.4     Notify your health care provider if you experience any of the following:  persistent nausea and vomiting or diarrhea     Notify your health care provider if you experience any of the following:  severe uncontrolled pain     Notify your health care provider if you experience any of the following:  redness, tenderness, or signs of infection (pain, swelling, redness, odor or green/yellow discharge around incision site)     Notify your health care provider if you experience any of the following:  difficulty breathing or increased cough     Notify your health care provider if you experience any of the following:  severe persistent headache     Notify your health care provider if you experience any of the following:  worsening rash     Notify your health care provider if you experience any of the following:  persistent dizziness, light-headedness, or visual disturbances     Activity as tolerated   Order Comments: Fall precautions       Significant Diagnostic Studies: Labs: CMP   Recent Labs   Lab 12/16/24 0442 12/17/24 0432   * 135*   K 3.7 3.9    100   CO2 27 28   GLU 93 87   BUN 22 26*   CREATININE 1.7* 1.8*   CALCIUM 8.5* 8.7   PROT 5.5* 5.7*   ALBUMIN 3.1* 3.2*   BILITOT 0.6 0.6   ALKPHOS 117 121   AST 14 14   ALT 11 12   ANIONGAP 7* 7*   , CBC   Recent Labs   Lab 12/16/24 0442 12/17/24  0432   WBC 6.86 6.41   HGB 7.5* 7.9*   HCT 25.5* 27.1*    243   , INR   Lab Results   Component Value Date    INR 1.1 12/07/2024    INR 1.1 08/28/2023   , Lipid Panel   Lab Results   Component Value Date    CHOL 100 (L) 12/07/2024    HDL 38 (L) 12/07/2024    LDLCALC 44.0 (L) 12/07/2024    TRIG 90  "12/07/2024    CHOLHDL 38.0 12/07/2024   , and Troponin No results for input(s): "TROPONINI" in the last 168 hours.  Microbiology: Blood Culture   Lab Results   Component Value Date    LABBLOO No growth after 5 days. 02/04/2024    and Urine Culture    Lab Results   Component Value Date    LABURIN ESCHERICHIA COLI  >100,000 cfu/ml   (A) 12/08/2024       Pending Diagnostic Studies:       None        ECHO:    Left Ventricle: The left ventricle is normal in size. Moderately increased wall thickness. Unable to assess wall motion. There is normal systolic function with a visually estimated ejection fraction of 55 - 60%. Unable to assess diastolic function due to atrial fibrillation.    Right Ventricle: Right ventricle was not well visualized due to poor acoustic window.    Left Atrium: Left atrium is severely dilated.    Right Atrium: Right atrium is severely dilated.    Aortic Valve: There is moderate to severe stenosis. Aortic valve area by VTI is 1.0 cm². Aortic valve peak velocity is 3.4 m/s. Mean gradient is 27.0 mmHg. The dimensionless index is 0.32. There is mild aortic regurgitation.    IVC/SVC: Elevated venous pressure at 15 mmHg.     Renal Artery US:   1.  Small echogenic left kidney compatible with chronic medical renal disease (with no hydronephrosis or shadowing stone in either kidney).  2.  Echogenic right kidney, with elevated resistive indices compatible with intrinsic medical renal disease.  3.  Bilateral simple renal cortical cysts.  4.  Relative elevated velocity and ratio in the right kidney suggesting a degree of renal artery stenosis on the right.     CT Head:  No CT evidence of acute intracranial abnormality. If the patient has an acute, focal neurological deficit, MRI of the brain may be indicated.  Generalized cerebral volume loss and chronic microvascular ischemic changes.     B/L hip x-rays: No acute findings.      CXR: No acute findings      Left humerus x-ray:  Osteopenia. No acute fracture or " dislocation. Moderate glenohumeral osteoarthritis. Nonspecific calcifications project over the soft tissues of proximal arm.      Left shoulder x-ray: Osteopenia. No acute fracture or dislocation. Moderate to advanced glenohumeral and mild AC joint osteoarthritis. Nonspecific soft tissue calcification projects over the shoulder.      CT C-spine:  No acute trauma.      MRI Brain without contrast:  No acute ischemic process. Moderate to advanced periventricular and deep white matter changes of chronic microvascular ischemia. GRE sequence demonstrates punctate focus of susceptibility artifact in the right cerebellum and left basal ganglia representing microhemorrhage or calcification.      MRA Brain:  Examination is significantly limited secondary to motion with apparent absence of flow related enhancement within the left middle cerebral artery branches, left vertebral artery, and the left posterior cerebral artery.  This is most likely artifactual.  CTA may be attempted for further evaluation.     Bilateral lower extremity venous Doppler study:   No evidence of bilateral lower extremity DVT     Medications:  Reconciled Home Medications:      Medication List        START taking these medications      aspirin 81 MG Chew  Take 1 tablet (81 mg total) by mouth once daily.     cephALEXin 250 MG capsule  Commonly known as: KEFLEX  Take 1 capsule (250 mg total) by mouth 4 (four) times daily. for 7 days     doxycycline 100 MG Cap  Commonly known as: VIBRAMYCIN  Take 1 capsule (100 mg total) by mouth every 12 (twelve) hours. for 6 days     furosemide 20 MG tablet  Commonly known as: LASIX  Take 1 tablet (20 mg total) by mouth every Mon, Wed, Fri.     isosorbide dinitrate 20 MG tablet  Commonly known as: ISORDIL  Take 1 tablet (20 mg total) by mouth 3 (three) times daily.     magnesium oxide 400 mg (241.3 mg magnesium) tablet  Commonly known as: MAG-OX  Take 1 tablet (400 mg total) by mouth once daily.     nicotine 21 mg/24  hr  Commonly known as: NICODERM CQ  Place 1 patch onto the skin once daily. for 14 days            CHANGE how you take these medications      amLODIPine 2.5 MG tablet  Commonly known as: NORVASC  Take 4 tablets (10 mg total) by mouth once daily.  What changed:   how much to take  when to take this     atorvastatin 40 MG tablet  Commonly known as: LIPITOR  Take 1 tablet (40 mg total) by mouth every evening.  What changed:   medication strength  how much to take     gabapentin 300 MG capsule  Commonly known as: NEURONTIN  Take 1 capsule (300 mg total) by mouth nightly as needed (pain).  What changed:   when to take this  reasons to take this     HYDROcodone-acetaminophen 5-325 mg per tablet  Commonly known as: NORCO  Take 1 tablet by mouth every 6 (six) hours as needed for Pain.  What changed: Another medication with the same name was removed. Continue taking this medication, and follow the directions you see here.     metoprolol succinate 50 MG 24 hr tablet  Commonly known as: TOPROL-XL  Take 1 tablet (50 mg total) by mouth 2 (two) times daily.  What changed:   medication strength  how much to take  when to take this     mupirocin 2 % ointment  Commonly known as: BACTROBAN  Apply topically 2 (two) times daily. To infected skin tear  What changed: how much to take     triamcinolone acetonide 0.1% 0.1 % cream  Commonly known as: KENALOG  Apply topically 2 (two) times daily.  What changed: how much to take            CONTINUE taking these medications      amiodarone 200 MG Tab  Commonly known as: PACERONE  Take 1 tablet (200 mg total) by mouth once daily.     buPROPion 150 MG TBSR 12 hr tablet  Commonly known as: WELLBUTRIN SR  Take 1 tablet (150 mg total) by mouth 2 (two) times daily.     ELIQUIS 5 mg Tab  Generic drug: apixaban  Take 5 mg by mouth 2 (two) times daily.     FLUoxetine 40 MG capsule  Take 1 capsule (40 mg total) by mouth once daily.     mirtazapine 15 MG tablet  Commonly known as: REMERON  Take 1 tablet  (15 mg total) by mouth every evening. For sleep            STOP taking these medications      betamethasone dipropionate 0.05 % cream     loratadine 10 mg tablet  Commonly known as: CLARITIN     methocarbamoL 750 MG Tab  Commonly known as: ROBAXIN     traZODone 50 MG tablet  Commonly known as: DESYREL              Indwelling Lines/Drains at time of discharge:   Lines/Drains/Airways       Drain  Duration             Female External Urinary Catheter w/ Suction 12/09/24 0645 7 days                    Time spent on the discharge of patient: 34 minutes         Yazmin Jules MD  Department of Hospital Medicine  [facility]

## 2024-12-17 NOTE — CARE UPDATE
12/17/24 0649   Patient Assessment/Suction   Level of Consciousness (AVPU) alert   Respiratory Effort Unlabored   Expansion/Accessory Muscles/Retractions no use of accessory muscles   All Lung Fields Breath Sounds Anterior:;Lateral:;clear;diminished   Rhythm/Pattern, Respiratory no shortness of breath reported   Cough Frequency no cough   PRE-TX-O2   Device (Oxygen Therapy) room air   SpO2 (!) 87 %   Pulse Oximetry Type Intermittent   $ Pulse Oximetry - Multiple Charge Pulse Oximetry - Multiple   Pulse 79   Resp 16   Positioning HOB elevated 30 degrees   Aerosol Therapy   $ Aerosol Therapy Charges Aerosol Treatment   Daily Review of Necessity (SVN) completed   Respiratory Treatment Status (SVN) given   Treatment Route (SVN) mask;oxygen   Patient Position HOB elevated   Post Treatment Assessment (SVN) breath sounds improved   Signs of Intolerance (SVN) none   Breath Sounds Post-Respiratory Treatment   Throughout All Fields Post-Treatment All Fields   Throughout All Fields Post-Treatment aeration increased   Post-treatment Heart Rate (beats/min) 77   Post-treatment Resp Rate (breaths/min) 18   Education   $ Education Oxygen;Bronchodilator;15 min

## 2024-12-18 ENCOUNTER — TELEPHONE (OUTPATIENT)
Dept: CARDIOLOGY | Facility: CLINIC | Age: 69
End: 2024-12-18
Payer: MEDICARE

## 2024-12-18 ENCOUNTER — TELEPHONE (OUTPATIENT)
Dept: FAMILY MEDICINE | Facility: CLINIC | Age: 69
End: 2024-12-18
Payer: MEDICARE

## 2024-12-18 NOTE — TELEPHONE ENCOUNTER
----- Message from Aneesh Sandoval sent at 12/17/2024  3:16 PM CST -----  Thank you  ----- Message -----  From: Lorie Ortiz RN  Sent: 12/16/2024  11:11 AM CST  To: [facility] Staff    Patient needs hospital follow up please :)    Thanks!!

## 2024-12-18 NOTE — TELEPHONE ENCOUNTER
----- Message from Nurse Horner sent at 12/18/2024  1:01 PM CST -----  Regarding: HFU  Call patient - needs post-hospital phone call within 2 business days and hospital follow up visit scheduled within 7-14 days.

## 2024-12-27 ENCOUNTER — PATIENT MESSAGE (OUTPATIENT)
Dept: FAMILY MEDICINE | Facility: CLINIC | Age: 69
End: 2024-12-27
Payer: MEDICARE

## 2024-12-30 ENCOUNTER — TELEPHONE (OUTPATIENT)
Dept: FAMILY MEDICINE | Facility: CLINIC | Age: 69
End: 2024-12-30
Payer: MEDICARE

## 2024-12-30 NOTE — TELEPHONE ENCOUNTER
----- Message from Chioma sent at 12/30/2024 12:10 PM CST -----  Patient was released from [facility] 2 days before [place]. Patient needs a [facility] f/u   [phone]

## 2024-12-31 RX ORDER — AMLODIPINE BESYLATE 2.5 MG/1
10 TABLET ORAL DAILY
Qty: 30 TABLET | Refills: 0 | Status: SHIPPED | OUTPATIENT
Start: 2024-12-31

## 2024-12-31 RX ORDER — ISOSORBIDE DINITRATE 20 MG/1
20 TABLET ORAL 3 TIMES DAILY
Qty: 90 TABLET | Refills: 0 | Status: SHIPPED | OUTPATIENT
Start: 2024-12-31 | End: 2025-12-31

## 2024-12-31 RX ORDER — AMLODIPINE BESYLATE 2.5 MG/1
10 TABLET ORAL DAILY
Qty: 30 TABLET | Refills: 0 | Status: SHIPPED | OUTPATIENT
Start: 2024-12-31 | End: 2024-12-31 | Stop reason: SDUPTHER

## 2024-12-31 RX ORDER — ISOSORBIDE DINITRATE 20 MG/1
20 TABLET ORAL 3 TIMES DAILY
Qty: 90 TABLET | Refills: 0 | Status: SHIPPED | OUTPATIENT
Start: 2024-12-31 | End: 2024-12-31 | Stop reason: SDUPTHER

## 2024-12-31 NOTE — TELEPHONE ENCOUNTER
Wrong pharmacy.

## 2025-01-03 ENCOUNTER — TELEPHONE (OUTPATIENT)
Dept: CARDIOLOGY | Facility: CLINIC | Age: 70
End: 2025-01-03
Payer: MEDICARE

## 2025-01-03 NOTE — TELEPHONE ENCOUNTER
S/w HH nurse & advised to take lasix 40mg today & tomorrow. Then take 20mg qd. HH nurse will be back on Tuesday & advised to draw cmp & bnp.

## 2025-01-03 NOTE — TELEPHONE ENCOUNTER
----- Message from Lorie sent at 1/3/2025  3:08 PM CST -----  Type:  Needs Medical Advice    Who Called: Katherine with [facility] [phone]      Would the patient rather a call back or a response via [facility]? Call Nurse     Best Call Back Number: [phone]    Additional Information: Katherine just spoke to nurse, Katherine says they wuill need a refill sent to the pharmacy      [facility] [address] [facility]    Please call back to advise. Thanks!

## 2025-01-03 NOTE — TELEPHONE ENCOUNTER
----- Message from Zoie sent at 1/3/2025  2:42 PM CST -----  Regarding: Call  Type:  Needs Medical Advice    Who Called: Megha/ [facility]    Would the patient rather a call back or a response via [facility]? Call     Best Call Back Number: [phone]    Additional Information: Pt gain 10lbs increase swelling to both legs, short of breath with excursion. Calling to request a recommendations. Peter

## 2025-01-06 ENCOUNTER — OFFICE VISIT (OUTPATIENT)
Dept: FAMILY MEDICINE | Facility: CLINIC | Age: 70
End: 2025-01-06
Payer: MEDICARE

## 2025-01-06 ENCOUNTER — LAB VISIT (OUTPATIENT)
Dept: LAB | Facility: HOSPITAL | Age: 70
End: 2025-01-06
Attending: INTERNAL MEDICINE
Payer: MEDICARE

## 2025-01-06 VITALS
HEART RATE: 72 BPM | BODY MASS INDEX: 42.64 KG/M2 | HEIGHT: 63 IN | OXYGEN SATURATION: 92 % | SYSTOLIC BLOOD PRESSURE: 156 MMHG | DIASTOLIC BLOOD PRESSURE: 70 MMHG | WEIGHT: 240.63 LBS

## 2025-01-06 DIAGNOSIS — N18.4 HYPERTENSIVE KIDNEY DISEASE WITH STAGE 4 CHRONIC KIDNEY DISEASE: Primary | ICD-10-CM

## 2025-01-06 DIAGNOSIS — N18.4 CHRONIC KIDNEY DISEASE, STAGE IV (SEVERE): ICD-10-CM

## 2025-01-06 DIAGNOSIS — I50.33 ACUTE ON CHRONIC HEART FAILURE WITH PRESERVED EJECTION FRACTION: ICD-10-CM

## 2025-01-06 DIAGNOSIS — I13.0 HYPERTENSIVE HEART AND RENAL DISEASE WITH CONGESTIVE HEART FAILURE: Primary | ICD-10-CM

## 2025-01-06 DIAGNOSIS — N18.4 CKD (CHRONIC KIDNEY DISEASE) STAGE 4, GFR 15-29 ML/MIN: ICD-10-CM

## 2025-01-06 DIAGNOSIS — J44.1 CHRONIC OBSTRUCTIVE PULMONARY DISEASE WITH ACUTE EXACERBATION: ICD-10-CM

## 2025-01-06 DIAGNOSIS — E66.01 MORBID OBESITY: ICD-10-CM

## 2025-01-06 DIAGNOSIS — R29.6 RECURRENT FALLS: ICD-10-CM

## 2025-01-06 DIAGNOSIS — E87.6 HYPOKALEMIA: ICD-10-CM

## 2025-01-06 DIAGNOSIS — I48.19 PERSISTENT ATRIAL FIBRILLATION: ICD-10-CM

## 2025-01-06 DIAGNOSIS — I12.9 HYPERTENSIVE KIDNEY DISEASE WITH STAGE 4 CHRONIC KIDNEY DISEASE: Primary | ICD-10-CM

## 2025-01-06 DIAGNOSIS — I35.2 NONRHEUMATIC AORTIC VALVE STENOSIS WITH REGURGITATION: ICD-10-CM

## 2025-01-06 DIAGNOSIS — R29.818 SUSPECTED SLEEP APNEA: ICD-10-CM

## 2025-01-06 DIAGNOSIS — Z23 NEED FOR INFLUENZA VACCINATION: ICD-10-CM

## 2025-01-06 LAB
ALBUMIN SERPL BCP-MCNC: 3.4 G/DL (ref 3.5–5.2)
ALP SERPL-CCNC: 155 U/L (ref 40–150)
ALT SERPL W/O P-5'-P-CCNC: 10 U/L (ref 10–44)
ANION GAP SERPL CALC-SCNC: 10 MMOL/L (ref 8–16)
AST SERPL-CCNC: 12 U/L (ref 10–40)
BILIRUB SERPL-MCNC: 0.7 MG/DL (ref 0.1–1)
BNP SERPL-MCNC: 1065 PG/ML (ref 0–99)
BUN SERPL-MCNC: 13 MG/DL (ref 8–23)
CALCIUM SERPL-MCNC: 8.5 MG/DL (ref 8.7–10.5)
CHLORIDE SERPL-SCNC: 102 MMOL/L (ref 95–110)
CO2 SERPL-SCNC: 26 MMOL/L (ref 23–29)
CREAT SERPL-MCNC: 1.9 MG/DL (ref 0.5–1.4)
EST. GFR  (NO RACE VARIABLE): 28 ML/MIN/1.73 M^2
GLUCOSE SERPL-MCNC: 83 MG/DL (ref 70–110)
POTASSIUM SERPL-SCNC: 3.4 MMOL/L (ref 3.5–5.1)
PROT SERPL-MCNC: 5.9 G/DL (ref 6–8.4)
SODIUM SERPL-SCNC: 138 MMOL/L (ref 136–145)

## 2025-01-06 PROCEDURE — 3288F FALL RISK ASSESSMENT DOCD: CPT | Mod: CPTII,S$GLB,, | Performed by: NURSE PRACTITIONER

## 2025-01-06 PROCEDURE — 1160F RVW MEDS BY RX/DR IN RCRD: CPT | Mod: CPTII,S$GLB,, | Performed by: NURSE PRACTITIONER

## 2025-01-06 PROCEDURE — 80053 COMPREHEN METABOLIC PANEL: CPT | Performed by: INTERNAL MEDICINE

## 2025-01-06 PROCEDURE — 36415 COLL VENOUS BLD VENIPUNCTURE: CPT | Performed by: INTERNAL MEDICINE

## 2025-01-06 PROCEDURE — 83880 ASSAY OF NATRIURETIC PEPTIDE: CPT | Performed by: INTERNAL MEDICINE

## 2025-01-06 PROCEDURE — 1125F AMNT PAIN NOTED PAIN PRSNT: CPT | Mod: CPTII,S$GLB,, | Performed by: NURSE PRACTITIONER

## 2025-01-06 PROCEDURE — 1100F PTFALLS ASSESS-DOCD GE2>/YR: CPT | Mod: CPTII,S$GLB,, | Performed by: NURSE PRACTITIONER

## 2025-01-06 PROCEDURE — 3008F BODY MASS INDEX DOCD: CPT | Mod: CPTII,S$GLB,, | Performed by: NURSE PRACTITIONER

## 2025-01-06 PROCEDURE — 90653 IIV ADJUVANT VACCINE IM: CPT | Mod: S$GLB,,, | Performed by: NURSE PRACTITIONER

## 2025-01-06 PROCEDURE — 1159F MED LIST DOCD IN RCRD: CPT | Mod: CPTII,S$GLB,, | Performed by: NURSE PRACTITIONER

## 2025-01-06 PROCEDURE — 3078F DIAST BP <80 MM HG: CPT | Mod: CPTII,S$GLB,, | Performed by: NURSE PRACTITIONER

## 2025-01-06 PROCEDURE — 3077F SYST BP >= 140 MM HG: CPT | Mod: CPTII,S$GLB,, | Performed by: NURSE PRACTITIONER

## 2025-01-06 PROCEDURE — 1111F DSCHRG MED/CURRENT MED MERGE: CPT | Mod: CPTII,S$GLB,, | Performed by: NURSE PRACTITIONER

## 2025-01-06 PROCEDURE — G0008 ADMIN INFLUENZA VIRUS VAC: HCPCS | Mod: S$GLB,,, | Performed by: NURSE PRACTITIONER

## 2025-01-06 PROCEDURE — 99214 OFFICE O/P EST MOD 30 MIN: CPT | Mod: 25,S$GLB,, | Performed by: NURSE PRACTITIONER

## 2025-01-06 RX ORDER — METOPROLOL SUCCINATE 50 MG/1
50 TABLET, EXTENDED RELEASE ORAL 2 TIMES DAILY
Qty: 180 TABLET | Refills: 3 | Status: SHIPPED | OUTPATIENT
Start: 2025-01-06 | End: 2026-01-06

## 2025-01-06 RX ORDER — POTASSIUM CHLORIDE 750 MG/1
10 CAPSULE, EXTENDED RELEASE ORAL DAILY
Qty: 30 CAPSULE | Refills: 2 | Status: SHIPPED | OUTPATIENT
Start: 2025-01-06

## 2025-01-06 RX ORDER — ISOSORBIDE DINITRATE 20 MG/1
20 TABLET ORAL 3 TIMES DAILY
Qty: 270 TABLET | Refills: 3 | Status: SHIPPED | OUTPATIENT
Start: 2025-01-06 | End: 2026-01-06

## 2025-01-06 RX ORDER — AMIODARONE HYDROCHLORIDE 200 MG/1
200 TABLET ORAL DAILY
Qty: 90 TABLET | Refills: 3 | Status: SHIPPED | OUTPATIENT
Start: 2025-01-06

## 2025-01-06 RX ORDER — BUDESONIDE, GLYCOPYRROLATE, AND FORMOTEROL FUMARATE 160; 9; 4.8 UG/1; UG/1; UG/1
2 AEROSOL, METERED RESPIRATORY (INHALATION) 2 TIMES DAILY
Start: 2025-01-06

## 2025-01-06 RX ORDER — AMLODIPINE BESYLATE 5 MG/1
10 TABLET ORAL DAILY
Qty: 60 TABLET | Refills: 11 | Status: SHIPPED | OUTPATIENT
Start: 2025-01-06

## 2025-01-06 NOTE — PROGRESS NOTES
Patient ID: Iris Mueller is a 69 y.o. female.    Chief Complaint: Follow-up (Pt brought medication list//Pt is here for a HFU, for fall and elevated b/p. Pt also stated that she is swelling in her upper legs and pelvic/stomach area//Pt would like to wait on her colo and mammo//Pt would like her flu vaccine//JAIME )        Admit Date: 12/7/24   Discharge Date: 12/17/24  Discharge Facility: Hospital    Medication Reconciliation:  Medications changed/added/deleted. ASA, keflex/doxy, furosemide M-W-F, isosorbide, MG, atorvastatin added. Amlodipine dose increased  New Prescriptions filled after discharge: yes  Discharge summary reviewed:  yes  Pending test results at discharge reviewed:   not applicable  Follow up appointments scheduled:  yes   with Cardiology  Dr. Tran 1/14/2025  Follow up labs/tests ordered:   no  Home Health ordered on discharge:   yes  Home Health company name: [facility]  DME ordered at discharge:   no  Disease/illness education:  HTN, debility, recurrent falls  Discussion with other health care providers: no  Establishment or re-establishment of referral orders for community resources: No other necessary community resources  Family and/or Caretaker present at visit?  yes    Discharge Summary: Patient is a 69 year old female with history of HTN, presented with recurrent falls, and slurred speech. Patient was found to be in hypertensive emergency on admission. CT C spine and head was negative. MRI brain was negative for acute findings. Showed punctate focus of susceptibility artifact in the right cerebellum and left basal ganglia representing microhemorrhage or calcification. Neurology was consulted. Patient was started on aspirin and Lipitor for possible TIA. Her home Eliquis was resumed. Patient was also started on PO Doxycycline for right lower extremity cellulitis. Nephrology consulted per family request for CKD IV. Patient continue to have episodes of confusion. Dementia  work up sent. Patient worked with PT, OT and recommended SNF.  Mental status improved. PO Doxy to continue for 10 days total. Patient was discharged but didn't leave as the facility did not take her due to BP being variable.  Skilled nursing facility did not accept the patient.  Renal artery Doppler ultrasound confirmed right-sided renal artery stenosis for which vascular surgery is being consulted.  Patient was evaluated by cardiology team and vascular surgery who determined no need for any vascular intervention for current level of right renal artery stenosis.  Patient has declined to go to skilled nursing facility.  Patient agreeable for home health with home PT and OT arrangements.  Patient instructed to avoid use of nonsteroidal anti-inflammatory medications.  Patient has been accepted at a local skilled nursing facility for further management and care.  Patient encouraged to improve oral protein intake.  Patient to take medications as directed.  Patient to continue close follow-up with primary care physician and cardiology team who will monitor patient closely and address patient's aortic valve stenosis management as outpatient.  Discharge plan of care reviewed with the patient who voiced understanding.     How patient is feeling since discharge from the hospital?  Pt here with her daughter and niece. Pt was discharged from hospital to [facility] SN[facility] but only stayed for 2.5 days and then wanted to be discharged to home.    Pt reports since discharge she feels like she's doing a little better but c/oing of worsening leg swelling all the way up to upper thighs and abdomen. Has been very inactive since discharge. Has HH PT/OT coming out but has been doing mainly chair exercises. Ambulating with walker room from room but very fatigued and SOB with activity. Family reports she's unable to care for herself d/t weakness- can't stand up more than 5 minutes or so d/t back/leg pain and bilat leg weakness. No falls since  discharge but has had numerous falls over the past few months. Denies orthopnea or productive cough    HH called Dr. Tran's office and reported increase in leg swelling and he just increased lasix to 20mg daily 3 days ago. Pt's weight is up 28lbs since her last visit here in October. She is weighing herself twice a week since discharge. Had labs drawn today for  with BNP 1065, K+ 3.3, CR 1.9  Pt reports HH is monitoring BP and it's been quite labile- at times down to 100, earlier today it was 120s. Her amlodipine dose was increased to 10mg daily on discharge. She has f/u with Dr. Tran next week    Pt has hx of COPD and breztri was added at last visit with Dr. You but pt admits she hasn't been using regularly- only uses rarely    Family reports overall MS has improved some, not as confused and out of it like she was before admit but hasn't returned to her baseline. Family is helping with her medications    Pt lives with her  who is mainly blind and deaf so daughter/niece and neighbors are helping them daily but family has concerns about ability for them to care for themselves. They are asking about other options for assistance    Follows with Dr. Deng for CKD 4- has f/u in March    Family reports neurologist in hospital had recommended sleep apnea d/t daytime fatigue and frequent night awakenings. Niece reports neuro also recommended cutting back on sedating meds- she's currently taking mirtazapine for sleep which isn't helping much and she takes hydrocodone TID for chronic back/knee pain. She has gabapentin on her med list but reports takes this only occasionally, less than once a month        Patient follow up phone call documented on separate encounter.    Lab Visit on 01/06/2025   Component Date Value Ref Range Status    Sodium 01/06/2025 138  136 - 145 mmol/L Final    Potassium 01/06/2025 3.4 (L)  3.5 - 5.1 mmol/L Final    Chloride 01/06/2025 102  95 - 110 mmol/L Final    CO2 01/06/2025 26  23 -  29 mmol/L Final    Glucose 01/06/2025 83  70 - 110 mg/dL Final    BUN 01/06/2025 13  8 - 23 mg/dL Final    Creatinine 01/06/2025 1.9 (H)  0.5 - 1.4 mg/dL Final    Calcium 01/06/2025 8.5 (L)  8.7 - 10.5 mg/dL Final    Total Protein 01/06/2025 5.9 (L)  6.0 - 8.4 g/dL Final    Albumin 01/06/2025 3.4 (L)  3.5 - 5.2 g/dL Final    Total Bilirubin 01/06/2025 0.7  0.1 - 1.0 mg/dL Final    Alkaline Phosphatase 01/06/2025 155 (H)  40 - 150 U/L Final    AST 01/06/2025 12  10 - 40 U/L Final    ALT 01/06/2025 10  10 - 44 U/L Final    eGFR 01/06/2025 28 (A)  >60 mL/min/1.73 m^2 Final    Anion Gap 01/06/2025 10  8 - 16 mmol/L Final    BNP 01/06/2025 1,065 (H)  0 - 99 pg/mL Final   No results displayed because visit has over 200 results.      Office Visit on 09/10/2024   Component Date Value Ref Range Status    Amphetamines 09/10/2024 NEGATIVE  <500 ng/mL Final    Barbiturates 09/10/2024 NEGATIVE  <300 ng/mL Final    Benzodiazepines 09/10/2024 NEGATIVE  <100 ng/mL Final    Cocaine Metabolites 09/10/2024 NEGATIVE  <150 ng/mL Final    Methadone 09/10/2024 NEGATIVE  <100 ng/mL Final    Opiates 09/10/2024 POSITIVE (A)  <100 ng/mL Final    Codeine 09/10/2024 NEGATIVE  <50 ng/mL Final    Hydrocodone 09/10/2024 NEGATIVE  <50 ng/mL Final    Hydromorphone 09/10/2024 NEGATIVE  <50 ng/mL Final    Morphine 09/10/2024 NEGATIVE  <50 ng/mL Final    NORHYDROCODONE 09/10/2024 50 (H)  <50 ng/mL Final    Opiates Comments 09/10/2024 SEE COMMENT   Final    Oxycodone 09/10/2024 NEGATIVE  <100 ng/mL Final    Phencyclidine 09/10/2024 NEGATIVE  <25 ng/mL Final    Creatinine 09/10/2024 88.4  > or = 20.0 mg/dL Final    pH 09/10/2024 6.4  4.5 - 9.0 Final    Oxidants, Urine (Tox) 09/10/2024 NEGATIVE  <200 mcg/mL Final    Notes and Comments 09/10/2024 SEE COMMENT   Final   Telephone on 08/23/2024   Component Date Value Ref Range Status    WBC 09/09/2024 6.4  3.8 - 10.8 Thousand/uL Final    RBC 09/09/2024 3.68 (L)  3.80 - 5.10 Million/uL Final    Hemoglobin  09/09/2024 10.1 (L)  11.7 - 15.5 g/dL Final    Hematocrit 09/09/2024 34.0 (L)  35.0 - 45.0 % Final    MCV 09/09/2024 92.4  80.0 - 100.0 fL Final    MCH 09/09/2024 27.4  27.0 - 33.0 pg Final    MCHC 09/09/2024 29.7 (L)  32.0 - 36.0 g/dL Final    RDW 09/09/2024 15.9 (H)  11.0 - 15.0 % Final    Platelets 09/09/2024 269  140 - 400 Thousand/uL Final    MPV 09/09/2024 9.2  7.5 - 12.5 fL Final    Neutrophils, Abs 09/09/2024 4,813  1,500 - 7,800 cells/uL Final    Lymph # 09/09/2024 909  850 - 3,900 cells/uL Final    Mono # 09/09/2024 422  200 - 950 cells/uL Final    Eos # 09/09/2024 218  15 - 500 cells/uL Final    Baso # 09/09/2024 38  0 - 200 cells/uL Final    Neutrophils Relative 09/09/2024 75.2  % Final    Lymph % 09/09/2024 14.2  % Final    Mono % 09/09/2024 6.6  % Final    Eosinophil % 09/09/2024 3.4  % Final    Basophil % 09/09/2024 0.6  % Final    Glucose 09/09/2024 85  65 - 99 mg/dL Final    BUN 09/09/2024 21  7 - 25 mg/dL Final    Creatinine 09/09/2024 2.26 (H)  0.60 - 1.00 mg/dL Final    eGFR 09/09/2024 23 (L)  > OR = 60 mL/min/1.73m2 Final    BUN/Creatinine Ratio 09/09/2024 9  6 - 22 (calc) Final    Sodium 09/09/2024 135  135 - 146 mmol/L Final    Potassium 09/09/2024 3.4 (L)  3.5 - 5.3 mmol/L Final    Chloride 09/09/2024 100  98 - 110 mmol/L Final    CO2 09/09/2024 25  20 - 32 mmol/L Final    Calcium 09/09/2024 8.6  8.6 - 10.4 mg/dL Final    Total Protein 09/09/2024 6.3  6.1 - 8.1 g/dL Final    Albumin 09/09/2024 3.7  3.6 - 5.1 g/dL Final    Globulin, Total 09/09/2024 2.6  1.9 - 3.7 g/dL (calc) Final    Albumin/Globulin Ratio 09/09/2024 1.4  1.0 - 2.5 (calc) Final    Total Bilirubin 09/09/2024 0.7  0.2 - 1.2 mg/dL Final    Alkaline Phosphatase 09/09/2024 114  37 - 153 U/L Final    AST 09/09/2024 13  10 - 35 U/L Final    ALT 09/09/2024 10  6 - 29 U/L Final    Ferritin 09/09/2024 15 (L)  16 - 288 ng/mL Final    Cholesterol 09/09/2024 123  <200 mg/dL Final    HDL 09/09/2024 49 (L)  > OR = 50 mg/dL Final     Triglycerides 2024 79  <150 mg/dL Final    LDL Cholesterol 2024 58  mg/dL (calc) Final    HDL/Cholesterol Ratio 2024 2.5  <5.0 (calc) Final    Non HDL Chol. (LDL+VLDL) 2024 74  <130 mg/dL (calc) Final       Past Medical History:   Diagnosis Date    Anemia, unspecified     Arthritis     Asthma     COPD (chronic obstructive pulmonary disease)     Encounter for blood transfusion     Hypertension     Obese body habitus     Wound infection 2019    Right knee     Past Surgical History:   Procedure Laterality Date    ANGIOGRAM, CORONARY, WITH LEFT HEART CATHETERIZATION N/A 2022    Procedure: Angiogram, Coronary, with Left Heart Cath;  Surgeon: Jorge Tran MD;  Location: [facility] CATH/EP LAB;  Service: Cardiology;  Laterality: N/A;     SECTION      ECHOCARDIOGRAM,TRANSESOPHAGEAL N/A 2023    Procedure: Transesophageal echo (MARIANO) intra-procedure log documentation;  Surgeon: Provider, Dosc Diagnostic;  Location: [facility] EP LAB;  Service: Cardiology;  Laterality: N/A;    INCISION AND DRAINAGE OF HEMATOMA Left 2024    Procedure: INCISION AND DRAINAGE, HEMATOMA;  Surgeon: Bryson Huerta MD;  Location: [facility] OR;  Service: Orthopedics;  Laterality: Left;    JOINT REPLACEMENT      Knee    KNEE ARTHROPLASTY Right 2019    Procedure: ARTHROPLASTY, KNEE;  Surgeon: Delio Chung MD;  Location: [facility] OR;  Service: Orthopedics;  Laterality: Right;  anesthesia:  general and block    REPLACEMENT OF WOUND VACUUM-ASSISTED CLOSURE DEVICE Right 2019    Procedure: REPLACEMENT, WOUND VAC;  Surgeon: Delio Chung MD;  Location: [facility] OR;  Service: Orthopedics;  Laterality: Right;    TONSILLECTOMY      TREATMENT OF CARDIAC ARRHYTHMIA N/A 2023    Procedure: Cardioversion or Defibrillation;  Surgeon: PJ Betancourt MD;  Location: [facility] EP LAB;  Service: Cardiology;  Laterality: N/A;  AF, MARIANO, DCCV, MAC, EH, 3 Prep    VENTRICULOGRAM, LEFT  2022    Procedure:  Ventriculogram, Left;  Surgeon: Jorge Tran MD;  Location: [facility] CATH/EP LAB;  Service: Cardiology;;     Family History   Problem Relation Name Age of Onset    Alzheimer's disease Mother         Tests to Keep You Healthy    Mammogram: ORDERED BUT NOT SCHEDULED  Colon Cancer Screening: ORDERED  Last Blood Pressure <= 139/89 (1/6/2025): NO  Tobacco Cessation: NO      Marital Status:   Alcohol History:  reports current alcohol use.  Tobacco History:  reports that she has been smoking cigarettes. She started smoking about 50 years ago. She has a 23.8 pack-year smoking history. She has been exposed to tobacco smoke. She has never used smokeless tobacco.  Drug History:  reports no history of drug use.    Review of patient's allergies indicates:  No Known Allergies    Current Outpatient Medications:     aspirin 81 MG Chew, Take 1 tablet (81 mg total) by mouth once daily., Disp: 30 tablet, Rfl: 0    atorvastatin (LIPITOR) 40 MG tablet, Take 1 tablet (40 mg total) by mouth every evening., Disp: 30 tablet, Rfl: 0    buPROPion (WELLBUTRIN SR) 150 MG TBSR 12 hr tablet, Take 1 tablet (150 mg total) by mouth 2 (two) times daily., Disp: 180 tablet, Rfl: 3    FLUoxetine 40 MG capsule, Take 1 capsule (40 mg total) by mouth once daily., Disp: 90 capsule, Rfl: 3    furosemide (LASIX) 20 MG tablet, Take 1 tablet (20 mg total) by mouth every Mon, Wed, Fri., Disp: 12 tablet, Rfl: 0    gabapentin (NEURONTIN) 300 MG capsule, Take 1 capsule (300 mg total) by mouth nightly as needed (pain)., Disp: 30 capsule, Rfl: 0    HYDROcodone-acetaminophen (NORCO) 5-325 mg per tablet, Take 1 tablet by mouth every 6 (six) hours as needed for Pain., Disp: 10 tablet, Rfl: 0    mirtazapine (REMERON) 15 MG tablet, Take 1 tablet (15 mg total) by mouth every evening. For sleep, Disp: 30 tablet, Rfl: 3    mupirocin (BACTROBAN) 2 % ointment, Apply topically 2 (two) times daily. To infected skin tear, Disp: 22 g, Rfl: 1    triamcinolone acetonide 0.1%  (KENALOG) 0.1 % cream, Apply topically 2 (two) times daily., Disp: 60 g, Rfl: 2    amiodarone (PACERONE) 200 MG Tab, Take 1 tablet (200 mg total) by mouth once daily., Disp: 90 tablet, Rfl: 3    amLODIPine (NORVASC) 5 MG tablet, Take 2 tablets (10 mg total) by mouth once daily., Disp: 60 tablet, Rfl: 11    apixaban (ELIQUIS) 5 mg Tab, Take 1 tablet (5 mg total) by mouth 2 (two) times daily., Disp: 60 tablet, Rfl: 11    budesonide-glycopyr-formoterol (BREZTRI AEROSPHERE) 160-9-4.8 mcg/actuation HFAA, Inhale 2 puffs into the lungs 2 (two) times a day., Disp: , Rfl:     isosorbide dinitrate (ISORDIL) 20 MG tablet, Take 1 tablet (20 mg total) by mouth 3 (three) times daily., Disp: 270 tablet, Rfl: 3    magnesium oxide (MAG-OX) 400 mg (241.3 mg magnesium) tablet, Take 1 tablet (400 mg total) by mouth once daily. (Patient not taking: Reported on 1/6/2025), Disp: 30 tablet, Rfl: 0    metoprolol succinate (TOPROL-XL) 50 MG 24 hr tablet, Take 1 tablet (50 mg total) by mouth 2 (two) times daily., Disp: 180 tablet, Rfl: 3    potassium chloride (MICRO-K) 10 MEQ CpSR, Take 1 capsule (10 mEq total) by mouth once daily. Take with furosemide, Disp: 30 capsule, Rfl: 2  No current facility-administered medications for this visit.    Facility-Administered Medications Ordered in Other Visits:     lidocaine (PF) 10 mg/ml (1%) injection 5 mg, 0.5 mL, Intradermal, Once, Azeem Anderson MD    Review of Systems   Constitutional:  Positive for fatigue and unexpected weight change (wt up 28lbs since Oct visit). Negative for appetite change, chills and fever.   HENT:  Negative for sore throat and trouble swallowing.    Eyes:  Negative for visual disturbance.   Respiratory:  Positive for shortness of breath (with any activity). Negative for cough and wheezing.    Cardiovascular:  Positive for leg swelling. Negative for chest pain and palpitations.   Gastrointestinal:  Negative for abdominal pain, diarrhea, nausea and vomiting.  "  Genitourinary:  Positive for frequency. Negative for dysuria and hematuria.   Musculoskeletal:  Positive for arthralgias (bilat leg/knee pain), back pain and gait problem (several recent falls). Negative for joint swelling.   Skin:  Negative for color change and wound.   Neurological:  Negative for dizziness, syncope, speech difficulty and headaches.   Hematological:  Bruises/bleeds easily.   Psychiatric/Behavioral:  Positive for confusion (improved since discharge) and sleep disturbance. Negative for dysphoric mood. The patient is not nervous/anxious.         Objective:      Vitals:    01/06/25 1503 01/06/25 1515   BP: (!) 160/72 (!) 156/70   Pulse: 72    SpO2: (!) 92%    Weight: 109.1 kg (240 lb 9.6 oz)    Height: 5' 3" (1.6 m)      Physical Exam  Vitals and nursing note reviewed.   Constitutional:       General: She is not in acute distress.     Appearance: She is well-developed.      Comments: Morbidly obese white female, appears older than stated age, ambulating with rollator   HENT:      Head: Normocephalic and atraumatic.      Right Ear: Tympanic membrane and ear canal normal.      Left Ear: Tympanic membrane and ear canal normal.   Neck:      Vascular: No carotid bruit.   Cardiovascular:      Rate and Rhythm: Normal rate. Rhythm irregular.      Heart sounds: Murmur (2nd ICS RSB) heard.      Systolic murmur is present with a grade of 3/6.      No friction rub. No gallop.   Pulmonary:      Effort: Pulmonary effort is normal. No respiratory distress.      Breath sounds: Rales (right base posteriorly) present. No wheezing.      Comments: Diminished left base posteriorly  Abdominal:      Palpations: Abdomen is soft.      Tenderness: There is no abdominal tenderness.   Musculoskeletal:      Left shoulder: No swelling, deformity, tenderness or bony tenderness. Decreased range of motion (pain with abduction beyond 80-90 degrees). Normal strength.      Cervical back: Neck supple.      Left knee: No erythema. Normal " range of motion. No tenderness.      Right lower leg: Edema (2+ edema extending to prox thighs bilat, edema seems worse to thighs than lower leg bilat. no erythema/ulcers to lower legs, chronic skin changes to lower legs) present.      Left lower leg: Edema present.   Lymphadenopathy:      Cervical: No cervical adenopathy.   Skin:     General: Skin is warm and dry.      Findings: No rash.   Neurological:      General: No focal deficit present.      Mental Status: She is alert and oriented to person, place, and time.      Gait: Gait abnormal (antaglic gait).   Psychiatric:         Mood and Affect: Mood normal.         Assessment:       1. Hypertensive kidney disease with stage 4 chronic kidney disease    2. CKD (chronic kidney disease) stage 4, GFR 15-29 ml/min    3. Hypokalemia    4. Acute on chronic heart failure with preserved ejection fraction    5. Persistent atrial fibrillation    6. Recurrent falls    7. Nonrheumatic aortic valve stenosis with regurgitation    8. Chronic obstructive pulmonary disease with acute exacerbation    9. Suspected sleep apnea    10. Morbid obesity    11. Need for influenza vaccination         Plan:       Hypertensive kidney disease with stage 4 chronic kidney disease  -BP elevated today though has been labile on home readings. Recommend keeping BP log and bringing to visit with Dr. Tran next week. I did discuss with pt/family given worsening leg edema may consider reducing amlodipine and adding hydralazine for BP control  -     isosorbide dinitrate (ISORDIL) 20 MG tablet; Take 1 tablet (20 mg total) by mouth 3 (three) times daily.  Dispense: 270 tablet; Refill: 3  -     metoprolol succinate (TOPROL-XL) 50 MG 24 hr tablet; Take 1 tablet (50 mg total) by mouth 2 (two) times daily.  Dispense: 180 tablet; Refill: 3  -     amLODIPine (NORVASC) 5 MG tablet; Take 2 tablets (10 mg total) by mouth once daily.  Dispense: 60 tablet; Refill: 11    CKD (chronic kidney disease) stage 4, GFR 15-29  ml/min  -stable on labs today. F/u with Dr. Deng as scheduled    Hypokalemia  -K low at 3.3 today with recent increase in lasix. Pt/family advised will add KCL to be taken with lasix however will need close monitoring of renal fxn/potassium levels.  -     potassium chloride (MICRO-K) 10 MEQ CpSR; Take 1 capsule (10 mEq total) by mouth once daily. Take with furosemide  Dispense: 30 capsule; Refill: 2    Acute on chronic heart failure with preserved ejection fraction  -elevated BNP with increase in leg swelling/wt gain. Lasix was increased to daily 3 days ago- continue with current dose for now though pt advised she needs to weigh herself daily and if gains >3lbs/overnight or >5lbs/week may need further med adjustment    Persistent atrial fibrillation  -rate stable  -     amiodarone (PACERONE) 200 MG Tab; Take 1 tablet (200 mg total) by mouth once daily.  Dispense: 90 tablet; Refill: 3  -     apixaban (ELIQUIS) 5 mg Tab; Take 1 tablet (5 mg total) by mouth 2 (two) times daily.  Dispense: 60 tablet; Refill: 11    Recurrent falls  -long discussion with pt/family regarding recurrent falls and overall debility. Family has concerns re pt caring for herself and her  and I agree. I did discuss with pt options including assisted living vs nursing home and given finances sounds like NH would more likely be the option though pt is hopeful she can get stronger and stay at home. Cautioned pt/family on safety concerns and risks of significant injury/morbidity with recurrent falls    Nonrheumatic aortic valve stenosis with regurgitation  -mod to severe AS on echo, f/u with Dr. Tran as scheduled.    Chronic obstructive pulmonary disease with acute exacerbation  -pt advised she needs to be using breztri BID every day  -     budesonide-glycopyr-formoterol (BREZTRI AEROSPHERE) 160-9-4.8 mcg/actuation HFAA; Inhale 2 puffs into the lungs 2 (two) times a day.    Suspected sleep apnea  -refer to pulm for sleep study  -      Ambulatory referral/consult to Pulmonology; Future; Expected date: 01/13/2025    Morbid obesity  -wt gain d/t fluid retention/CHF though morbid obesity is definitely playing a role in her debility/recurrent falls. Encouraged low salt diet and cut out processed foods    Need for influenza vaccination  -     influenza (adjuvanted) (Fluad) 45 mcg/0.5 mL IM vaccine (> or = 66 yo) 0.5 mL      Follow up in about 2 months (around 3/6/2025).

## 2025-01-07 ENCOUNTER — DOCUMENT SCAN (OUTPATIENT)
Dept: HOME HEALTH SERVICES | Facility: HOSPITAL | Age: 70
End: 2025-01-07
Payer: MEDICARE

## 2025-01-08 ENCOUNTER — EXTERNAL HOME HEALTH (OUTPATIENT)
Dept: HOME HEALTH SERVICES | Facility: HOSPITAL | Age: 70
End: 2025-01-08
Payer: MEDICARE

## 2025-01-08 DIAGNOSIS — S01.81XD LACERATION OF FOREHEAD, SUBSEQUENT ENCOUNTER: ICD-10-CM

## 2025-01-08 RX ORDER — HYDROCODONE BITARTRATE AND ACETAMINOPHEN 5; 325 MG/1; MG/1
1 TABLET ORAL 3 TIMES DAILY
Qty: 90 TABLET | Refills: 0 | Status: SHIPPED | OUTPATIENT
Start: 2025-01-08

## 2025-01-08 NOTE — TELEPHONE ENCOUNTER
----- Message from Carolyne sent at 1/8/2025 12:02 PM CST -----  Pt needs refill on hydrocodone   [facility]   [phone]

## 2025-01-14 ENCOUNTER — OFFICE VISIT (OUTPATIENT)
Dept: CARDIOLOGY | Facility: CLINIC | Age: 70
End: 2025-01-14
Payer: MEDICARE

## 2025-01-14 ENCOUNTER — TELEPHONE (OUTPATIENT)
Dept: CARDIOLOGY | Facility: CLINIC | Age: 70
End: 2025-01-14

## 2025-01-14 VITALS
BODY MASS INDEX: 42.58 KG/M2 | SYSTOLIC BLOOD PRESSURE: 135 MMHG | WEIGHT: 240.31 LBS | HEIGHT: 63 IN | DIASTOLIC BLOOD PRESSURE: 82 MMHG | OXYGEN SATURATION: 95 % | HEART RATE: 86 BPM

## 2025-01-14 DIAGNOSIS — I10 ESSENTIAL HYPERTENSION: ICD-10-CM

## 2025-01-14 DIAGNOSIS — Z91.81 FALLS INFREQUENTLY: ICD-10-CM

## 2025-01-14 DIAGNOSIS — I70.1 RENAL ARTERY STENOSIS: ICD-10-CM

## 2025-01-14 DIAGNOSIS — R06.83 SNORING: ICD-10-CM

## 2025-01-14 DIAGNOSIS — E66.813 CLASS 3 SEVERE OBESITY WITH BODY MASS INDEX (BMI) OF 40.0 TO 44.9 IN ADULT, UNSPECIFIED OBESITY TYPE, UNSPECIFIED WHETHER SERIOUS COMORBIDITY PRESENT: ICD-10-CM

## 2025-01-14 DIAGNOSIS — I35.0 NONRHEUMATIC AORTIC VALVE STENOSIS: ICD-10-CM

## 2025-01-14 DIAGNOSIS — E78.5 DYSLIPIDEMIA: ICD-10-CM

## 2025-01-14 DIAGNOSIS — E66.01 CLASS 3 SEVERE OBESITY WITH BODY MASS INDEX (BMI) OF 40.0 TO 44.9 IN ADULT, UNSPECIFIED OBESITY TYPE, UNSPECIFIED WHETHER SERIOUS COMORBIDITY PRESENT: ICD-10-CM

## 2025-01-14 DIAGNOSIS — I48.19 PERSISTENT ATRIAL FIBRILLATION: ICD-10-CM

## 2025-01-14 DIAGNOSIS — I50.32 CHRONIC HEART FAILURE WITH PRESERVED EJECTION FRACTION: Primary | ICD-10-CM

## 2025-01-14 DIAGNOSIS — R09.89 BILATERAL CAROTID BRUITS: ICD-10-CM

## 2025-01-14 PROCEDURE — 3008F BODY MASS INDEX DOCD: CPT | Mod: CPTII,S$GLB,, | Performed by: INTERNAL MEDICINE

## 2025-01-14 PROCEDURE — 3079F DIAST BP 80-89 MM HG: CPT | Mod: CPTII,S$GLB,, | Performed by: INTERNAL MEDICINE

## 2025-01-14 PROCEDURE — 99214 OFFICE O/P EST MOD 30 MIN: CPT | Mod: S$GLB,,, | Performed by: INTERNAL MEDICINE

## 2025-01-14 PROCEDURE — 3075F SYST BP GE 130 - 139MM HG: CPT | Mod: CPTII,S$GLB,, | Performed by: INTERNAL MEDICINE

## 2025-01-14 PROCEDURE — 99999 PR PBB SHADOW E&M-EST. PATIENT-LVL IV: CPT | Mod: PBBFAC,,, | Performed by: INTERNAL MEDICINE

## 2025-01-14 PROCEDURE — 1126F AMNT PAIN NOTED NONE PRSNT: CPT | Mod: CPTII,S$GLB,, | Performed by: INTERNAL MEDICINE

## 2025-01-14 PROCEDURE — 1159F MED LIST DOCD IN RCRD: CPT | Mod: CPTII,S$GLB,, | Performed by: INTERNAL MEDICINE

## 2025-01-14 PROCEDURE — 1111F DSCHRG MED/CURRENT MED MERGE: CPT | Mod: CPTII,S$GLB,, | Performed by: INTERNAL MEDICINE

## 2025-01-14 RX ORDER — ATORVASTATIN CALCIUM 10 MG/1
10 TABLET, FILM COATED ORAL NIGHTLY
Qty: 90 TABLET | Refills: 3 | Status: SHIPPED | OUTPATIENT
Start: 2025-01-14 | End: 2026-01-14

## 2025-01-14 RX ORDER — FUROSEMIDE 20 MG/1
20 TABLET ORAL DAILY
Qty: 30 TABLET | Refills: 0 | Status: SHIPPED | OUTPATIENT
Start: 2025-01-14 | End: 2025-01-14 | Stop reason: SDUPTHER

## 2025-01-14 RX ORDER — FUROSEMIDE 40 MG/1
40 TABLET ORAL DAILY
Qty: 90 TABLET | Refills: 3 | Status: SHIPPED | OUTPATIENT
Start: 2025-01-14 | End: 2025-01-16

## 2025-01-14 RX ORDER — SEMAGLUTIDE 0.25 MG/.5ML
0.25 INJECTION, SOLUTION SUBCUTANEOUS
Qty: 6 ML | Refills: 3 | Status: ON HOLD | OUTPATIENT
Start: 2025-01-14 | End: 2025-01-28

## 2025-01-14 RX ORDER — FUROSEMIDE 20 MG/1
20 TABLET ORAL DAILY
Qty: 90 TABLET | Refills: 3 | Status: SHIPPED | OUTPATIENT
Start: 2025-01-14 | End: 2025-01-14 | Stop reason: SDUPTHER

## 2025-01-14 NOTE — TELEPHONE ENCOUNTER
----- Message from Marilyn sent at 1/14/2025  2:34 PM CST -----  Type:  Pharmacy Calling to Clarify an RX    Name of Caller:  Robin  Pharmacy Name:  [facility]  Prescription Name:  semaglutide, weight loss, (WEGOVY) 0.25 mg/0.5 mL PnIj 6 mL 3 1/14/2025 1/14/2026   Sig - Route: Inject 0.25 mg into the skin every 7 days. - Subcutaneous   Sent to pharmacy as: semaglutide, weight loss, (WEGOVY) 0.25 mg/0.5 mL PnIj       What do they need to clarify?:    Best Call Back Number:  [phone]  Additional Information:  wants to know is there medical records for pharmacist to review, wants to know is there medical records to support the request    Case number: [id number] due date is Jan.15th 11:30 am

## 2025-01-14 NOTE — PROGRESS NOTES
[facility]    Subjective:     Patient ID:  Iris Mueller is a 69 y.o. female patient here for evaluation Hospital Follow Up (On 2024 patient went to Er for hypertension. She is also having edema , legs and feet. )      HPI:  69-year-old female here for follow-up.  Has history of atrial fibrillation status post MARIANO cardioversion in the past.  Has been having recurrent falls for which she was admitted to the hospital.  Found to be in AFib and was found to have moderate to severe aortic stenosis with a valve area of 1.  Back in AFib but rate controlled.  BNP was elevated, started on Lasix, reports some improvement in pedal edema.  Currently in a wheelchair in the clinic but does walk around at home.    Review of Systems   All other systems reviewed and are negative.       Past Medical History:   Diagnosis Date    Anemia, unspecified     Arthritis     Asthma     COPD (chronic obstructive pulmonary disease)     Encounter for blood transfusion     Hypertension     Obese body habitus     Wound infection 2019    Right knee       Past Surgical History:   Procedure Laterality Date    ANGIOGRAM, CORONARY, WITH LEFT HEART CATHETERIZATION N/A 2022    Procedure: Angiogram, Coronary, with Left Heart Cath;  Surgeon: Jorge Tran MD;  Location: [facility] CATH/EP LAB;  Service: Cardiology;  Laterality: N/A;     SECTION      ECHOCARDIOGRAM,TRANSESOPHAGEAL N/A 2023    Procedure: Transesophageal echo (MARIANO) intra-procedure log documentation;  Surgeon: Provider, Araceli Diagnostic;  Location: [facility] EP LAB;  Service: Cardiology;  Laterality: N/A;    INCISION AND DRAINAGE OF HEMATOMA Left 2024    Procedure: INCISION AND DRAINAGE, HEMATOMA;  Surgeon: Bryson Huerta MD;  Location: [facility] OR;  Service: Orthopedics;  Laterality: Left;    JOINT REPLACEMENT      Knee    KNEE ARTHROPLASTY Right 2019    Procedure: ARTHROPLASTY, KNEE;   Surgeon: Delio Chung MD;  Location: [facility] OR;  Service: Orthopedics;  Laterality: Right;  anesthesia:  general and block    REPLACEMENT OF WOUND VACUUM-ASSISTED CLOSURE DEVICE Right 9/12/2019    Procedure: REPLACEMENT, WOUND VAC;  Surgeon: Delio Chung MD;  Location: [facility] OR;  Service: Orthopedics;  Laterality: Right;    TONSILLECTOMY      TREATMENT OF CARDIAC ARRHYTHMIA N/A 8/31/2023    Procedure: Cardioversion or Defibrillation;  Surgeon: PJ Betancourt MD;  Location: [facility] EP LAB;  Service: Cardiology;  Laterality: N/A;  AF, MARIANO, DCCV, MAC, EH, 3 Prep    VENTRICULOGRAM, LEFT  12/23/2022    Procedure: Ventriculogram, Left;  Surgeon: Jorge Tran MD;  Location: [facility] CATH/EP LAB;  Service: Cardiology;;       Family History   Problem Relation Name Age of Onset    Alzheimer's disease Mother         Social History     Socioeconomic History    Marital status:    Tobacco Use    Smoking status: Every Day     Current packs/day: 0.25     Average packs/day: 0.5 packs/day for 50.0 years (23.8 ttl pk-yrs)     Types: Cigarettes     Start date: 1975     Passive exposure: Current    Smokeless tobacco: Never    Tobacco comments:     Pt states she has been quit now for a couple weeks, plans to stay quit.   Substance and Sexual Activity    Alcohol use: Yes     Comment: RARELY    Drug use: Never    Sexual activity: Not Currently     Partners: Male     Social Drivers of Health     Financial Resource Strain: Patient Declined (12/8/2024)    Overall Financial Resource Strain (CARDIA)     Difficulty of Paying Living Expenses: Patient declined   Food Insecurity: Patient Declined (12/8/2024)    Hunger Vital Sign     Worried About Running Out of Food in the Last Year: Patient declined     Ran Out of Food in the Last Year: Patient declined   Transportation Needs: Patient Declined (12/8/2024)    TRANSPORTATION NEEDS     Transportation : Patient declined   Physical Activity: Patient Declined (12/23/2022)    Exercise Vital  Sign     Days of Exercise per Week: Patient declined     Minutes of Exercise per Session: Patient declined   Stress: Patient Declined (12/8/2024)    [place] [employer] - Occupational Stress Questionnaire     Feeling of Stress : Patient declined   Housing Stability: Patient Declined (12/8/2024)    Housing Stability Vital Sign     Unable to Pay for Housing in the Last Year: Patient declined     Homeless in the Last Year: Patient declined       Current Outpatient Medications   Medication Sig Dispense Refill    amiodarone (PACERONE) 200 MG Tab Take 1 tablet (200 mg total) by mouth once daily. 90 tablet 3    amLODIPine (NORVASC) 5 MG tablet Take 2 tablets (10 mg total) by mouth once daily. 60 tablet 11    apixaban (ELIQUIS) 5 mg Tab Take 1 tablet (5 mg total) by mouth 2 (two) times daily. 60 tablet 11    budesonide-glycopyr-formoterol (BREZTRI AEROSPHERE) 160-9-4.8 mcg/actuation HFAA Inhale 2 puffs into the lungs 2 (two) times a day.      buPROPion (WELLBUTRIN SR) 150 MG TBSR 12 hr tablet Take 1 tablet (150 mg total) by mouth 2 (two) times daily. 180 tablet 3    FLUoxetine 40 MG capsule Take 1 capsule (40 mg total) by mouth once daily. 90 capsule 3    gabapentin (NEURONTIN) 300 MG capsule Take 1 capsule (300 mg total) by mouth nightly as needed (pain). 30 capsule 0    HYDROcodone-acetaminophen (NORCO) 5-325 mg per tablet Take 1 tablet by mouth 3 (three) times daily. 90 tablet 0    isosorbide dinitrate (ISORDIL) 20 MG tablet Take 1 tablet (20 mg total) by mouth 3 (three) times daily. 270 tablet 3    metoprolol succinate (TOPROL-XL) 50 MG 24 hr tablet Take 1 tablet (50 mg total) by mouth 2 (two) times daily. 180 tablet 3    mirtazapine (REMERON) 15 MG tablet Take 1 tablet (15 mg total) by mouth every evening. For sleep 30 tablet 3    mupirocin (BACTROBAN) 2 % ointment Apply topically 2 (two) times daily. To infected skin tear 22 g 1    potassium chloride (MICRO-K) 10 MEQ CpSR Take 1 capsule (10 mEq  total) by mouth once daily. Take with furosemide 30 capsule 2    triamcinolone acetonide 0.1% (KENALOG) 0.1 % cream Apply topically 2 (two) times daily. 60 g 2    atorvastatin (LIPITOR) 10 MG tablet Take 1 tablet (10 mg total) by mouth every evening. 90 tablet 3    furosemide (LASIX) 40 MG tablet Take 1 tablet (40 mg total) by mouth once daily. 90 tablet 3    semaglutide, weight loss, (WEGOVY) 0.25 mg/0.5 mL PnIj Inject 0.25 mg into the skin every 7 days. 6 mL 3     No current facility-administered medications for this visit.     Facility-Administered Medications Ordered in Other Visits   Medication Dose Route Frequency Provider Last Rate Last Admin    lidocaine (PF) 10 mg/ml (1%) injection 5 mg  0.5 mL Intradermal Once Azeem Anderson MD           Review of patient's allergies indicates:  No Known Allergies      Objective:        Vitals:    01/14/25 1002   BP: 135/82   Pulse: 86       Physical Exam  Vitals reviewed.   Constitutional:       Appearance: Normal appearance. She is obese.   Cardiovascular:      Rate and Rhythm: Normal rate. Rhythm irregular.      Pulses: Normal pulses.      Heart sounds: Normal heart sounds. No murmur heard.     No gallop.   Pulmonary:      Effort: Pulmonary effort is normal.      Breath sounds: Normal breath sounds.   Skin:     General: Skin is warm.   Neurological:      General: No focal deficit present.      Mental Status: She is alert and oriented to person, place, and time.         LIPIDS - LAST 2   Lab Results   Component Value Date    CHOL 100 (L) 12/07/2024    CHOL 123 09/09/2024    HDL 38 (L) 12/07/2024    HDL 49 (L) 09/09/2024    LDLCALC 44.0 (L) 12/07/2024    LDLCALC 58 09/09/2024    TRIG 90 12/07/2024    TRIG 79 09/09/2024    CHOLHDL 38.0 12/07/2024    CHOLHDL 2.5 09/09/2024       CBC - LAST 2  Lab Results   Component Value Date    WBC 6.41 12/17/2024    WBC 6.86 12/16/2024    RBC 3.01 (L) 12/17/2024    RBC 2.84 (L) 12/16/2024    HGB 7.9 (L) 12/17/2024    HGB 7.5 (L)  12/16/2024    HCT 27.1 (L) 12/17/2024    HCT 25.5 (L) 12/16/2024    MCV 90 12/17/2024    MCV 90 12/16/2024    MCH 26.2 (L) 12/17/2024    MCH 26.4 (L) 12/16/2024    MCHC 29.2 (L) 12/17/2024    MCHC 29.4 (L) 12/16/2024    RDW 19.8 (H) 12/17/2024    RDW 19.6 (H) 12/16/2024     12/17/2024     12/16/2024    MPV 9.1 (L) 12/17/2024    MPV 9.0 (L) 12/16/2024    GRAN 4.7 12/17/2024    GRAN 73.2 (H) 12/17/2024    LYMPH 0.8 (L) 12/17/2024    LYMPH 12.3 (L) 12/17/2024    MONO 0.6 12/17/2024    MONO 8.6 12/17/2024    BASO 0.03 12/17/2024    BASO 0.03 12/16/2024    NRBC 0 12/17/2024    NRBC 0 12/16/2024       CHEMISTRY & LIVER FUNCTION - LAST 2  Lab Results   Component Value Date     01/06/2025     (L) 12/17/2024    K 3.4 (L) 01/06/2025    K 3.9 12/17/2024     01/06/2025     12/17/2024    CO2 26 01/06/2025    CO2 28 12/17/2024    ANIONGAP 10 01/06/2025    ANIONGAP 7 (L) 12/17/2024    BUN 13 01/06/2025    BUN 26 (H) 12/17/2024    CREATININE 1.9 (H) 01/06/2025    CREATININE 1.8 (H) 12/17/2024    GLU 83 01/06/2025    GLU 87 12/17/2024    CALCIUM 8.5 (L) 01/06/2025    CALCIUM 8.7 12/17/2024    PH 6.4 09/10/2024    PH 6.1 05/08/2023    MG 1.9 12/17/2024    MG 1.8 12/16/2024    ALBUMIN 3.4 (L) 01/06/2025    ALBUMIN 3.2 (L) 12/17/2024    PROT 5.9 (L) 01/06/2025    PROT 5.7 (L) 12/17/2024    ALKPHOS 155 (H) 01/06/2025    ALKPHOS 121 12/17/2024    ALT 10 01/06/2025    ALT 12 12/17/2024    AST 12 01/06/2025    AST 14 12/17/2024    BILITOT 0.7 01/06/2025    BILITOT 0.6 12/17/2024        CARDIAC PROFILE - LAST 2  Lab Results   Component Value Date    BNP 1,065 (H) 01/06/2025     (H) 02/03/2024    CPK 25 02/03/2024    TROPONINI 0.523 (H) 12/23/2022    TROPONINI 0.648 (H) 12/23/2022        COAGULATION - LAST 2  Lab Results   Component Value Date    INR 1.1 12/07/2024    INR 1.1 08/28/2023    APTT 33 (H) 08/28/2023       ENDOCRINE & PSA - LAST 2  Lab Results   Component Value Date    HGBA1C 5.3  12/08/2024    HGBA1C 5.2 12/23/2022    MICROALBUR 11.5 02/26/2024    MICROALBUR 1.9 02/02/2024    TSH 1.814 12/07/2024    TSH 0.881 02/03/2024    PROCAL <0.05 12/25/2022    PROCAL <0.05 12/24/2022        ECHOCARDIOGRAM RESULTS  Results for orders placed during the hospital encounter of 12/07/24    Echo Saline Bubble? No    Interpretation Summary    Left Ventricle: The left ventricle is normal in size. Moderately increased wall thickness. Unable to assess wall motion. There is normal systolic function with a visually estimated ejection fraction of 55 - 60%. Unable to assess diastolic function due to atrial fibrillation.    Right Ventricle: Right ventricle was not well visualized due to poor acoustic window.    Left Atrium: Left atrium is severely dilated.    Right Atrium: Right atrium is severely dilated.    Aortic Valve: There is moderate to severe stenosis. Aortic valve area by VTI is 1.0 cm². Aortic valve peak velocity is 3.4 m/s. Mean gradient is 27.0 mmHg. The dimensionless index is 0.32. There is mild aortic regurgitation.    IVC/SVC: Elevated venous pressure at 15 mmHg.      CURRENT/PREVIOUS VISIT EKG  Results for orders placed or performed during the hospital encounter of 12/07/24   ECG 12 lead    Collection Time: 12/07/24  8:08 PM   Result Value Ref Range    QRS Duration 112 ms    OHS QTC Calculation 490 ms    Narrative    Test Reason : R29.818,    Vent. Rate :  85 BPM     Atrial Rate :    BPM     P-R Int :    ms          QRS Dur : 112 ms      QT Int : 412 ms       P-R-T Axes :     91  30 degrees    QTcB Int : 490 ms    Atrial fibrillation  Rightward axis  Nonspecific ST abnormality  Prolonged QT  Abnormal ECG  No previous ECGs available  Confirmed by Oli Lara ([id number]) on 12/8/2024 9:35:02 AM    Referred By: AAAREFERRAL SELF           Confirmed By: Oli Lara     No valid procedures specified.   Results for orders placed in visit on 08/08/19    Nuc Pharm Stress test - Radiologist  interpreted    Interpretation Summary    The patient reported no chest pain during the stress test.    No significant EKG changes from baseline    Myoview SPECT scan report is awaited for correlation.    No valid procedures specified.        Assessment:       1. Chronic heart failure with preserved ejection fraction    2. Essential hypertension    3. Persistent atrial fibrillation    4. Nonrheumatic aortic valve stenosis    5. Falls infrequently    6. Class 3 severe obesity with body mass index (BMI) of 40.0 to 44.9 in adult, unspecified obesity type, unspecified whether serious comorbidity present    7. Renal artery stenosis    8. Bilateral carotid bruits    9. Dyslipidemia    10. Snoring           Plan:       Chronic heart failure with preserved ejection fraction  -     Ambulatory referral/consult to Interventional Cardiology; Future; Expected date: 01/21/2025  -     semaglutide, weight loss, (WEGOVY) 0.25 mg/0.5 mL PnIj; Inject 0.25 mg into the skin every 7 days.  Dispense: 6 mL; Refill: 3  -     furosemide (LASIX) 40 MG tablet; Take 1 tablet (40 mg total) by mouth once daily.  Dispense: 90 tablet; Refill: 3  -     Basic metabolic panel; Future; Expected date: 01/14/2025  -     B-TYPE NATRIURETIC PEPTIDE; Future; Expected date: 01/14/2025    Essential hypertension  -     IN OFFICE EKG 12-LEAD (to [place])    Persistent atrial fibrillation  -     IN OFFICE EKG 12-LEAD (to [place])  -     Ambulatory referral/consult to Electrophysiology; Future; Expected date: 01/21/2025  -     semaglutide, weight loss, (WEGOVY) 0.25 mg/0.5 mL PnIj; Inject 0.25 mg into the skin every 7 days.  Dispense: 6 mL; Refill: 3    Nonrheumatic aortic valve stenosis  -     Ambulatory referral/consult to Interventional Cardiology; Future; Expected date: 01/21/2025  -     semaglutide, weight loss, (WEGOVY) 0.25 mg/0.5 mL PnIj; Inject 0.25 mg into the skin every 7 days.  Dispense: 6 mL; Refill: 3    Falls infrequently  -     Ambulatory  referral/consult to Electrophysiology; Future; Expected date: 01/21/2025    Class 3 severe obesity with body mass index (BMI) of 40.0 to 44.9 in adult, unspecified obesity type, unspecified whether serious comorbidity present  -     semaglutide, weight loss, (WEGOVY) 0.25 mg/0.5 mL PnIj; Inject 0.25 mg into the skin every 7 days.  Dispense: 6 mL; Refill: 3    Renal artery stenosis    Bilateral carotid bruits  -     Radiology US Carotid Bilateral; Future; Expected date: 01/14/2025    Dyslipidemia  -     atorvastatin (LIPITOR) 10 MG tablet; Take 1 tablet (10 mg total) by mouth every evening.  Dispense: 90 tablet; Refill: 3    Snoring    Other orders  -     Discontinue: furosemide (LASIX) 20 MG tablet; Take 1 tablet (20 mg total) by mouth once daily.  Dispense: 30 tablet; Refill: 0  -     Discontinue: furosemide (LASIX) 20 MG tablet; Take 1 tablet (20 mg total) by mouth once daily.  Dispense: 90 tablet; Refill: 3    Increase Lasix.  Blood work before next follow-up.  Refer to EP for consideration of Watchman and to see repeat cardioversion versus discontinuing amiodarone.  Refer to structural cardiology for evaluation of aortic stenosis.  Angiogram in 2022 did not show any significant CAD.    Given snoring, early fatigued during the daytime and cardiovascular issues, will get a sleep study.    Decrease statin, last LDL at target, no significant CAD.    Renal artery stenosis management as per vascular team who saw her in the hospital.    Follow up in about 6 weeks (around 2/25/2025) for f/u HFpEF, BMP, BNP, carotid Duplex, TAVR/ Watchman, afib.          MD Kristel [facility] [place] [facility] [place]

## 2025-01-16 PROBLEM — I35.0 NONRHEUMATIC AORTIC VALVE STENOSIS: Status: ACTIVE | Noted: 2023-06-12

## 2025-01-18 ENCOUNTER — HOSPITAL ENCOUNTER (INPATIENT)
Facility: HOSPITAL | Age: 70
LOS: 7 days | Discharge: NURSING, REHAB OR LONG-TERM CARE | DRG: 682 | End: 2025-01-28
Attending: EMERGENCY MEDICINE | Admitting: INTERNAL MEDICINE
Payer: MEDICARE

## 2025-01-18 DIAGNOSIS — R53.81 DEBILITY: ICD-10-CM

## 2025-01-18 DIAGNOSIS — I48.19 PERSISTENT ATRIAL FIBRILLATION: ICD-10-CM

## 2025-01-18 DIAGNOSIS — E78.2 MIXED HYPERLIPIDEMIA: ICD-10-CM

## 2025-01-18 DIAGNOSIS — N18.4 CKD (CHRONIC KIDNEY DISEASE) STAGE 4, GFR 15-29 ML/MIN: ICD-10-CM

## 2025-01-18 DIAGNOSIS — I25.10 CORONARY ARTERY DISEASE INVOLVING NATIVE CORONARY ARTERY OF NATIVE HEART WITHOUT ANGINA PECTORIS: ICD-10-CM

## 2025-01-18 DIAGNOSIS — R26.2 AMBULATORY DYSFUNCTION: ICD-10-CM

## 2025-01-18 DIAGNOSIS — I50.32 CHRONIC HEART FAILURE WITH PRESERVED EJECTION FRACTION: ICD-10-CM

## 2025-01-18 DIAGNOSIS — N17.9 AKI (ACUTE KIDNEY INJURY): Primary | ICD-10-CM

## 2025-01-18 DIAGNOSIS — E78.5 DYSLIPIDEMIA: ICD-10-CM

## 2025-01-18 DIAGNOSIS — I35.0 NONRHEUMATIC AORTIC VALVE STENOSIS: ICD-10-CM

## 2025-01-18 DIAGNOSIS — I12.9 HYPERTENSIVE KIDNEY DISEASE WITH STAGE 4 CHRONIC KIDNEY DISEASE: ICD-10-CM

## 2025-01-18 DIAGNOSIS — G93.40 ACUTE ENCEPHALOPATHY: ICD-10-CM

## 2025-01-18 DIAGNOSIS — T14.90XA TRAUMA: ICD-10-CM

## 2025-01-18 DIAGNOSIS — I50.42 CHRONIC COMBINED SYSTOLIC AND DIASTOLIC HEART FAILURE: ICD-10-CM

## 2025-01-18 DIAGNOSIS — I35.0 MODERATE AORTIC STENOSIS: ICD-10-CM

## 2025-01-18 DIAGNOSIS — I50.20 HFREF (HEART FAILURE WITH REDUCED EJECTION FRACTION): ICD-10-CM
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
## 2025-01-18 DIAGNOSIS — N18.4 HYPERTENSIVE KIDNEY DISEASE WITH STAGE 4 CHRONIC KIDNEY DISEASE: ICD-10-CM

## 2025-01-18 DIAGNOSIS — I10 PRIMARY HYPERTENSION: ICD-10-CM

## 2025-01-18 DIAGNOSIS — I49.9 ARRHYTHMIA: ICD-10-CM

## 2025-01-18 PROBLEM — R55 SYNCOPE: Status: ACTIVE | Noted: 2025-01-18

## 2025-01-18 LAB
ALBUMIN SERPL BCP-MCNC: 4 G/DL (ref 3.5–5.2)
ALP SERPL-CCNC: 174 U/L (ref 55–135)
ALT SERPL W/O P-5'-P-CCNC: 13 U/L (ref 10–44)
ANION GAP SERPL CALC-SCNC: 9 MMOL/L (ref 8–16)
AST SERPL-CCNC: 17 U/L (ref 10–40)
BASOPHILS # BLD AUTO: 0.04 K/UL (ref 0–0.2)
BASOPHILS NFR BLD: 0.5 % (ref 0–1.9)
BILIRUB SERPL-MCNC: 0.6 MG/DL (ref 0.1–1)
BILIRUB UR QL STRIP: NEGATIVE
BUN SERPL-MCNC: 27 MG/DL (ref 8–23)
CALCIUM SERPL-MCNC: 8.7 MG/DL (ref 8.7–10.5)
CHLORIDE SERPL-SCNC: 103 MMOL/L (ref 95–110)
CK SERPL-CCNC: 37 U/L (ref 20–180)
CLARITY UR: CLEAR
CO2 SERPL-SCNC: 27 MMOL/L (ref 23–29)
COLOR UR: YELLOW
CREAT SERPL-MCNC: 2.2 MG/DL (ref 0.5–1.4)
DIFFERENTIAL METHOD BLD: ABNORMAL
EOSINOPHIL # BLD AUTO: 0.2 K/UL (ref 0–0.5)
EOSINOPHIL NFR BLD: 2.5 % (ref 0–8)
ERYTHROCYTE [DISTWIDTH] IN BLOOD BY AUTOMATED COUNT: 19.1 % (ref 11.5–14.5)
EST. GFR  (NO RACE VARIABLE): 23.4 ML/MIN/1.73 M^2
GLUCOSE SERPL-MCNC: 93 MG/DL (ref 70–110)
GLUCOSE UR QL STRIP: NEGATIVE
HCT VFR BLD AUTO: 28.1 % (ref 37–48.5)
HCV AB SERPL QL IA: NEGATIVE
HGB BLD-MCNC: 8.1 G/DL (ref 12–16)
HGB UR QL STRIP: NEGATIVE
HIV 1+2 AB+HIV1 P24 AG SERPL QL IA: NEGATIVE
IMM GRANULOCYTES # BLD AUTO: 0.1 K/UL (ref 0–0.04)
IMM GRANULOCYTES NFR BLD AUTO: 1.2 % (ref 0–0.5)
KETONES UR QL STRIP: NEGATIVE
LEUKOCYTE ESTERASE UR QL STRIP: NEGATIVE
LYMPHOCYTES # BLD AUTO: 0.5 K/UL (ref 1–4.8)
LYMPHOCYTES NFR BLD: 6.5 % (ref 18–48)
MCH RBC QN AUTO: 26.5 PG (ref 27–31)
MCHC RBC AUTO-ENTMCNC: 28.8 G/DL (ref 32–36)
MCV RBC AUTO: 92 FL (ref 82–98)
MONOCYTES # BLD AUTO: 0.4 K/UL (ref 0.3–1)
MONOCYTES NFR BLD: 5 % (ref 4–15)
NEUTROPHILS # BLD AUTO: 7 K/UL (ref 1.8–7.7)
NEUTROPHILS NFR BLD: 84.3 % (ref 38–73)
NITRITE UR QL STRIP: NEGATIVE
NRBC BLD-RTO: 0 /100 WBC
OHS QRS DURATION: 104 MS
OHS QTC CALCULATION: 499 MS
PH UR STRIP: 7 [PH] (ref 5–8)
PLATELET # BLD AUTO: 300 K/UL (ref 150–450)
PMV BLD AUTO: 8.8 FL (ref 9.2–12.9)
POTASSIUM SERPL-SCNC: 4.3 MMOL/L (ref 3.5–5.1)
PROT SERPL-MCNC: 6.7 G/DL (ref 6–8.4)
PROT UR QL STRIP: NEGATIVE
RBC # BLD AUTO: 3.06 M/UL (ref 4–5.4)
SODIUM SERPL-SCNC: 139 MMOL/L (ref 136–145)
SP GR UR STRIP: 1.01 (ref 1–1.03)
URN SPEC COLLECT METH UR: NORMAL
UROBILINOGEN UR STRIP-ACNC: NEGATIVE EU/DL
WBC # BLD AUTO: 8.27 K/UL (ref 3.9–12.7)

## 2025-01-18 PROCEDURE — 36415 COLL VENOUS BLD VENIPUNCTURE: CPT | Performed by: EMERGENCY MEDICINE

## 2025-01-18 PROCEDURE — 80053 COMPREHEN METABOLIC PANEL: CPT | Performed by: EMERGENCY MEDICINE

## 2025-01-18 PROCEDURE — 93005 ELECTROCARDIOGRAM TRACING: CPT | Performed by: GENERAL PRACTICE

## 2025-01-18 PROCEDURE — G0378 HOSPITAL OBSERVATION PER HR: HCPCS

## 2025-01-18 PROCEDURE — 25000003 PHARM REV CODE 250: Performed by: HOSPITALIST

## 2025-01-18 PROCEDURE — 81003 URINALYSIS AUTO W/O SCOPE: CPT | Performed by: EMERGENCY MEDICINE

## 2025-01-18 PROCEDURE — 87389 HIV-1 AG W/HIV-1&-2 AB AG IA: CPT | Performed by: EMERGENCY MEDICINE

## 2025-01-18 PROCEDURE — 85025 COMPLETE CBC W/AUTO DIFF WBC: CPT | Performed by: EMERGENCY MEDICINE

## 2025-01-18 PROCEDURE — 82550 ASSAY OF CK (CPK): CPT | Performed by: EMERGENCY MEDICINE

## 2025-01-18 PROCEDURE — 93010 ELECTROCARDIOGRAM REPORT: CPT | Mod: ,,, | Performed by: GENERAL PRACTICE

## 2025-01-18 PROCEDURE — 86803 HEPATITIS C AB TEST: CPT | Performed by: EMERGENCY MEDICINE

## 2025-01-18 PROCEDURE — 93010 ELECTROCARDIOGRAM REPORT: CPT | Mod: 76,,, | Performed by: GENERAL PRACTICE

## 2025-01-18 PROCEDURE — 99285 EMERGENCY DEPT VISIT HI MDM: CPT | Mod: 25

## 2025-01-18 PROCEDURE — 30233N1 TRANSFUSION OF NONAUTOLOGOUS RED BLOOD CELLS INTO PERIPHERAL VEIN, PERCUTANEOUS APPROACH: ICD-10-PCS | Performed by: NURSE PRACTITIONER

## 2025-01-18 PROCEDURE — 25000003 PHARM REV CODE 250: Performed by: EMERGENCY MEDICINE

## 2025-01-18 PROCEDURE — 25000003 PHARM REV CODE 250: Performed by: NURSE PRACTITIONER

## 2025-01-18 RX ORDER — HYDROCODONE BITARTRATE AND ACETAMINOPHEN 5; 325 MG/1; MG/1
TABLET ORAL
Status: DISPENSED
Start: 2025-01-18 | End: 2025-01-19

## 2025-01-18 RX ORDER — IPRATROPIUM BROMIDE 0.5 MG/2.5ML
0.5 SOLUTION RESPIRATORY (INHALATION)
Status: DISCONTINUED | OUTPATIENT
Start: 2025-01-19 | End: 2025-01-28 | Stop reason: HOSPADM

## 2025-01-18 RX ORDER — SPIRONOLACTONE 25 MG/1
25 TABLET ORAL DAILY
Status: DISCONTINUED | OUTPATIENT
Start: 2025-01-19 | End: 2025-01-18

## 2025-01-18 RX ORDER — HYDROCODONE BITARTRATE AND ACETAMINOPHEN 5; 325 MG/1; MG/1
1 TABLET ORAL EVERY 6 HOURS PRN
Status: DISCONTINUED | OUTPATIENT
Start: 2025-01-18 | End: 2025-01-19

## 2025-01-18 RX ORDER — SODIUM CHLORIDE 0.9 % (FLUSH) 0.9 %
10 SYRINGE (ML) INJECTION
Status: DISCONTINUED | OUTPATIENT
Start: 2025-01-18 | End: 2025-01-28 | Stop reason: HOSPADM

## 2025-01-18 RX ORDER — ACETAMINOPHEN 325 MG/1
650 TABLET ORAL EVERY 4 HOURS PRN
Status: DISCONTINUED | OUTPATIENT
Start: 2025-01-18 | End: 2025-01-20

## 2025-01-18 RX ORDER — BUMETANIDE 1 MG/1
1 TABLET ORAL DAILY
Status: DISCONTINUED | OUTPATIENT
Start: 2025-01-19 | End: 2025-01-18

## 2025-01-18 RX ORDER — ARFORMOTEROL TARTRATE 15 UG/2ML
15 SOLUTION RESPIRATORY (INHALATION) 2 TIMES DAILY
Status: DISCONTINUED | OUTPATIENT
Start: 2025-01-19 | End: 2025-01-28 | Stop reason: HOSPADM

## 2025-01-18 RX ORDER — ATORVASTATIN CALCIUM 10 MG/1
10 TABLET, FILM COATED ORAL NIGHTLY
Status: DISCONTINUED | OUTPATIENT
Start: 2025-01-18 | End: 2025-01-28 | Stop reason: HOSPADM

## 2025-01-18 RX ORDER — BUPROPION HYDROCHLORIDE 150 MG/1
150 TABLET, EXTENDED RELEASE ORAL 2 TIMES DAILY
Status: DISCONTINUED | OUTPATIENT
Start: 2025-01-18 | End: 2025-01-28 | Stop reason: HOSPADM

## 2025-01-18 RX ORDER — BUDESONIDE 0.5 MG/2ML
0.5 INHALANT ORAL EVERY 12 HOURS
Status: DISCONTINUED | OUTPATIENT
Start: 2025-01-19 | End: 2025-01-28 | Stop reason: HOSPADM

## 2025-01-18 RX ORDER — AMIODARONE HYDROCHLORIDE 200 MG/1
200 TABLET ORAL DAILY
Status: DISCONTINUED | OUTPATIENT
Start: 2025-01-19 | End: 2025-01-23

## 2025-01-18 RX ORDER — MIRTAZAPINE 7.5 MG/1
15 TABLET, FILM COATED ORAL NIGHTLY
Status: DISCONTINUED | OUTPATIENT
Start: 2025-01-18 | End: 2025-01-28 | Stop reason: HOSPADM

## 2025-01-18 RX ORDER — ACETAMINOPHEN 500 MG
1000 TABLET ORAL
Status: COMPLETED | OUTPATIENT
Start: 2025-01-18 | End: 2025-01-18

## 2025-01-18 RX ORDER — METOPROLOL SUCCINATE 50 MG/1
50 TABLET, EXTENDED RELEASE ORAL 2 TIMES DAILY
Status: DISCONTINUED | OUTPATIENT
Start: 2025-01-18 | End: 2025-01-28 | Stop reason: HOSPADM

## 2025-01-18 RX ORDER — MUPIROCIN 20 MG/G
OINTMENT TOPICAL 2 TIMES DAILY
Status: DISCONTINUED | OUTPATIENT
Start: 2025-01-18 | End: 2025-01-28 | Stop reason: HOSPADM

## 2025-01-18 RX ORDER — FLUOXETINE HYDROCHLORIDE 20 MG/1
40 CAPSULE ORAL DAILY
Status: DISCONTINUED | OUTPATIENT
Start: 2025-01-19 | End: 2025-01-28 | Stop reason: HOSPADM

## 2025-01-18 RX ADMIN — BUPROPION HYDROCHLORIDE 150 MG: 150 TABLET, FILM COATED, EXTENDED RELEASE ORAL at 09:01

## 2025-01-18 RX ADMIN — ACETAMINOPHEN 1000 MG: 500 TABLET ORAL at 03:01

## 2025-01-18 RX ADMIN — HYDROCODONE BITARTRATE AND ACETAMINOPHEN 1 TABLET: 5; 325 TABLET ORAL at 10:01

## 2025-01-18 RX ADMIN — ATORVASTATIN CALCIUM 10 MG: 10 TABLET, FILM COATED ORAL at 09:01

## 2025-01-18 RX ADMIN — APIXABAN 5 MG: 5 TABLET, FILM COATED ORAL at 09:01

## 2025-01-18 RX ADMIN — MIRTAZAPINE 15 MG: 15 TABLET, FILM COATED ORAL at 09:01

## 2025-01-18 RX ADMIN — MUPIROCIN 1 G: 20 OINTMENT TOPICAL at 09:01

## 2025-01-18 NOTE — LETTER
Fax Transmission                                                                                                                                                       Date: January 24, 2025       To:  PHN From: Reba Fenton   Fax:   Fax:    Phone:   Phone: [phone]     Special Instructions:     Please review for SNF Auth.                        Confidentiality notice     The documents accompanying this transmission may contain confidential health information that is legally privileged. This information is intended only for the use of the individual or entity named above. The authorized recipient of this information is prohibited from disclosing this information to any other party unless required to do so by law or regulation and is required to destroy the information after its stated need has been fulfilled.   If you are not the intended recipient, you are hereby notified that any disclosure, copying, distribution, or action taken in reliance on the contents of these documents is strictly prohibited. If you have received this information in error, please notify the sender immediately or [facility]  by phone at [phone] and arrange for the return or destruction of these documents.

## 2025-01-18 NOTE — ED PROVIDER NOTES
Encounter Date: 2025       History     Chief Complaint   Patient presents with    Fall     Fell after getting out of shower. No loc, hurting all over     This is a 69-year-old female past medical history COPD, hypertension, obesity, CHF, presents emergency department with a fall.  Patient has had frequent falls recently.  She fell getting out of the shower.  She is not entirely sure what happened.  She is not sure why she fell.  She has pain all over.  Mostly complains of pain to her bilateral knees and to her chest.  She says that she does not know why she fell but she was in the shower when it happened.  She does not think that she slipped a she does take Norco 3 times a day and gabapentin.      Review of patient's allergies indicates:  No Known Allergies  Past Medical History:   Diagnosis Date    Anemia, unspecified     Arthritis     Asthma     COPD (chronic obstructive pulmonary disease)     Encounter for blood transfusion     Hypertension     Obese body habitus     Wound infection 2019    Right knee     Past Surgical History:   Procedure Laterality Date    ANGIOGRAM, CORONARY, WITH LEFT HEART CATHETERIZATION N/A 2022    Procedure: Angiogram, Coronary, with Left Heart Cath;  Surgeon: Jorge Tran MD;  Location: [facility] CATH/EP LAB;  Service: Cardiology;  Laterality: N/A;     SECTION      ECHOCARDIOGRAM,TRANSESOPHAGEAL N/A 2023    Procedure: Transesophageal echo (MARIANO) intra-procedure log documentation;  Surgeon: Araceli Sue Diagnostic;  Location: [facility] EP LAB;  Service: Cardiology;  Laterality: N/A;    INCISION AND DRAINAGE OF HEMATOMA Left 2024    Procedure: INCISION AND DRAINAGE, HEMATOMA;  Surgeon: Bryson Huerta MD;  Location: [facility] OR;  Service: Orthopedics;  Laterality: Left;    JOINT REPLACEMENT      Knee    KNEE ARTHROPLASTY Right 2019    Procedure: ARTHROPLASTY, KNEE;  Surgeon: Delio Chung MD;  Location: [facility] OR;  Service: Orthopedics;  Laterality:  Right;  anesthesia:  general and block    REPLACEMENT OF WOUND VACUUM-ASSISTED CLOSURE DEVICE Right 9/12/2019    Procedure: REPLACEMENT, WOUND VAC;  Surgeon: Delio Chung MD;  Location: [facility];  Service: Orthopedics;  Laterality: Right;    TONSILLECTOMY      TREATMENT OF CARDIAC ARRHYTHMIA N/A 8/31/2023    Procedure: Cardioversion or Defibrillation;  Surgeon: PJ Betancourt MD;  Location: [facility] EP LAB;  Service: Cardiology;  Laterality: N/A;  AF, MARIANO, DCCV, MAC, EH, 3 Prep    VENTRICULOGRAM, LEFT  12/23/2022    Procedure: Ventriculogram, Left;  Surgeon: Jorge Tarn MD;  Location: [facility] CATH/EP LAB;  Service: Cardiology;;     Family History   Problem Relation Name Age of Onset    Alzheimer's disease Mother       Social History     Tobacco Use    Smoking status: Every Day     Current packs/day: 0.25     Average packs/day: 0.5 packs/day for 50.0 years (23.8 ttl pk-yrs)     Types: Cigarettes     Start date: 1975     Passive exposure: Current    Smokeless tobacco: Never    Tobacco comments:     Pt states she has been quit now for a couple weeks, plans to stay quit.   Substance Use Topics    Alcohol use: Yes     Comment: RARELY    Drug use: Never     Review of Systems   Constitutional:  Negative for chills and fever.   HENT:  Negative for sore throat.    Respiratory:  Negative for shortness of breath.    Cardiovascular:  Negative for chest pain and palpitations.   Gastrointestinal:  Negative for abdominal pain, nausea and vomiting.   Genitourinary:  Negative for dysuria.   Musculoskeletal:  Positive for arthralgias and myalgias. Negative for back pain.   Skin:  Negative for rash.   Neurological:  Negative for weakness and headaches.   Hematological:  Does not bruise/bleed easily.   All other systems reviewed and are negative.      Physical Exam     Initial Vitals [01/18/25 1334]   BP Pulse Resp Temp SpO2   120/77 68 20 97.7 °F (36.5 °C) (!) 94 %      MAP       --         Physical Exam    Nursing note and  vitals reviewed.  Constitutional: She appears well-developed and well-nourished. She is Obese . No distress.   Sarcopenia and deconditioned   HENT:   Head: Normocephalic and atraumatic. Mouth/Throat: No oropharyngeal exudate.   Dry mucous membranes   Eyes: Conjunctivae and EOM are normal. Pupils are equal, round, and reactive to light.   Neck: Neck supple. No tracheal deviation present.   Normal range of motion.  Cardiovascular:  Normal rate, regular rhythm, normal heart sounds and intact distal pulses.           No murmur heard.  Pulmonary/Chest: Breath sounds normal. No respiratory distress. She has no wheezes. She has no rhonchi. She has no rales.   Abdominal: Abdomen is soft. She exhibits no distension. There is no abdominal tenderness. There is no rebound.   Musculoskeletal:         General: No tenderness or edema. Normal range of motion.      Cervical back: Normal range of motion and neck supple.      Comments: Bilateral knee abrasions, decreased range of motion to the knees but no obvious deformity present.  No shortening or external rotation of either leg.    Right upper arm there is a bruise and abrasion to the right upper arm.  There is no deformity present there is near full range of motion there is tenderness to palpation overlying this region.    Right forearm: Patient has a bandage present with what appears to be a necrotic skin tear to the lateral aspect of the right forearm.     Neurological: She is alert and oriented to person, place, and time. She has normal strength. No cranial nerve deficit or sensory deficit. GCS score is 15. GCS eye subscore is 4. GCS verbal subscore is 5. GCS motor subscore is 6.   Patient is drowsy and falls asleep while talking to her.  She does wake up and follow commands she has a GCS of 15 no focal neurological deficits.  Speech is normal, no facial droop.  No pronator drift.   Skin: Skin is warm and dry. Capillary refill takes less than 2 seconds. No rash noted. No  erythema. No pallor.   Psychiatric: She has a normal mood and affect. Thought content normal.         ED Course   Procedures  Labs Reviewed   CBC W/ AUTO DIFFERENTIAL - Abnormal       Result Value    WBC 8.27      RBC 3.06 (*)     Hemoglobin 8.1 (*)     Hematocrit 28.1 (*)     MCV 92      MCH 26.5 (*)     MCHC 28.8 (*)     RDW 19.1 (*)     Platelets 300      MPV 8.8 (*)     Immature Granulocytes 1.2 (*)     Gran # (ANC) 7.0      Immature Grans (Abs) 0.10 (*)     Lymph # 0.5 (*)     Mono # 0.4      Eos # 0.2      Baso # 0.04      nRBC 0      Gran % 84.3 (*)     Lymph % 6.5 (*)     Mono % 5.0      Eosinophil % 2.5      Basophil % 0.5      Differential Method Automated      Narrative:     Release to patient->Immediate   COMPREHENSIVE METABOLIC PANEL - Abnormal    Sodium 139      Potassium 4.3      Chloride 103      CO2 27      Glucose 93      BUN 27 (*)     Creatinine 2.2 (*)     Calcium 8.7      Total Protein 6.7      Albumin 4.0      Total Bilirubin 0.6      Alkaline Phosphatase 174 (*)     AST 17      ALT 13      eGFR 23.4 (*)     Anion Gap 9      Narrative:     Release to patient->Immediate   HEPATITIS C ANTIBODY    Hepatitis C Ab Negative      Narrative:     Release to patient->Immediate   HIV 1 / 2 ANTIBODY    HIV 1/2 Ag/Ab Negative      Narrative:     Release to patient->Immediate   URINALYSIS, REFLEX TO URINE CULTURE    Specimen UA Urine, Clean Catch      Color, UA Yellow      Appearance, UA Clear      pH, UA 7.0      Specific Gravity, UA 1.010      Protein, UA Negative      Glucose, UA Negative      Ketones, UA Negative      Bilirubin (UA) Negative      Occult Blood UA Negative      Nitrite, UA Negative      Urobilinogen, UA Negative      Leukocytes, UA Negative      Narrative:     Specimen Source->Urine        ECG Results              EKG 12-lead (In process)        Collection Time Result Time QRS Duration OHS QTC Calculation    01/18/25 13:55:48 01/18/25 13:59:46 102 482                     In process by  Interface, Lab In [facility] (01/18/25 13:59:48)                   Narrative:    Test Reason : W19.XXXA,    Vent. Rate :  71 BPM     Atrial Rate :    BPM     P-R Int :    ms          QRS Dur : 102 ms      QT Int : 444 ms       P-R-T Axes :     72  79 degrees    QTcB Int : 482 ms    Atrial fibrillation  Septal infarct ,age undetermined  Abnormal ECG  No previous ECGs available    Referred By: AAAREFERRAL SELF           Confirmed By:                                   Imaging Results              US Carotid Bilateral (In process)                      X-Ray Humerus 2 View Right (Final result)  Result time 01/18/25 17:09:36      Final result by Iraida Millan MD (01/18/25 17:09:36)                   Impression:      Negative right shoulder      Electronically signed by: Iraida Millan  Date:    01/18/2025  Time:    17:09               Narrative:    EXAMINATION:  XR HUMERUS 2 VIEW RIGHT    CLINICAL HISTORY:  Injury, unspecified, initial encounter    FINDINGS:  Two views right shoulder show no fracture, dislocation, or destructive osseous lesion. Soft tissues are unremarkable.                                       CT Head Without Contrast (Final result)  Result time 01/18/25 14:52:25      Final result by Iraida Millan MD (01/18/25 14:52:25)                   Impression:      No acute intracranial abnormalities. Chronic findings as discussed above.      Electronically signed by: Iraida Millan  Date:    01/18/2025  Time:    14:52               Narrative:    EXAMINATION:  CT HEAD WITHOUT CONTRAST    CLINICAL HISTORY:  Head trauma, minor (Age >= 65y);.    TECHNIQUE:  Noninfusion images were obtained from the skull base to the vertex.    All CT scans at this facility utilize dose modulation, iterative reconstruction, and/or weight based dosing when appropriate to reduce radiation dose to as low as reasonably achievable    CMS MANDATED QUALITY DATA - CT RADIATION - 436    COMPARISON:  12/07/2024    FINDINGS:  There  is no intracranial mass, hemorrhage, or midline shift. Ventricles and sulci are mildly prominent. There are no pathologic extra-axial fluid collections.    There is no evidence of cortical ischemic change. Changes of mild chronic microvascular white matter disease are noted. Cerebellum and brainstem are normal. The calvarium is intact.                                       X-Ray Pelvis Routine AP (Final result)  Result time 01/18/25 15:09:20      Final result by Iraida Millan MD (01/18/25 15:09:20)                   Impression:      Negative pelvis      Electronically signed by: Iraida Millan  Date:    01/18/2025  Time:    15:09               Narrative:    CLINICAL HISTORY:  (MRN [id number])70 y/o  (1955) F    trauma;    TECHNIQUE:  (A# [id number], Exam time 1/18/2025 14:31)    [id number] XR PELVIS ROUTINE AP  view(s) obtained.    COMPARISON:  None available.    FINDINGS:  Two views of the pelvis    There are no fractures, dislocations or acute osseous abnormalities.  The soft tissues are normal.  There is peripheral vascular calcification.                                       X-Ray Knee 3 View Bilateral (Final result)  Result time 01/18/25 15:08:33      Final result by Iraida Millan MD (01/18/25 15:08:33)                   Impression:      No acute osseous abnormality    Total right knee arthroplasty without complication    Diffuse soft tissue edema      Electronically signed by: Iraida Millan  Date:    01/18/2025  Time:    15:08               Narrative:    EXAMINATION:  XR KNEE 3 VIEW BILATERAL    CLINICAL HISTORY:  Unspecified fall, initial encounter    FINDINGS:  Two views of the bilateral knees demonstrates a total right knee arthroplasty in satisfactory alignment.  There are no fractures or acute osseous abnormalities.    Two views of the left knee demonstrates mild tricompartmental joint space narrowing.  There are no fractures or acute osseous abnormalities.    There is no suprapatellar  joint effusion.  There is diffuse soft tissue edema.                                       X-Ray Chest 1 View (Final result)  Result time 01/18/25 14:53:04      Final result by Iraida Millan MD (01/18/25 14:53:04)                   Impression:      Cardiomegaly with no acute pulmonary process      Electronically signed by: Iraida Millan  Date:    01/18/2025  Time:    14:53               Narrative:    EXAMINATION:  XR CHEST 1 VIEW    CLINICAL HISTORY:  Unspecified fall, initial encounter    FINDINGS:  Portable chest x-ray at 14:10 hours is compared to prior study 12/07/2024    The heart is enlarged.  The lungs are clear.  There are no acute osseous abnormalities.                                       Medications   mupirocin 2 % ointment (has no administration in time range)   amiodarone tablet 200 mg (has no administration in time range)   apixaban tablet 5 mg (has no administration in time range)   atorvastatin tablet 10 mg (has no administration in time range)   bumetanide tablet 1 mg (has no administration in time range)   FLUoxetine capsule 40 mg (has no administration in time range)   buPROPion TBSR 12 hr tablet 150 mg (has no administration in time range)   metoprolol succinate (TOPROL-XL) 24 hr tablet 50 mg (has no administration in time range)   mirtazapine tablet 15 mg (has no administration in time range)   spironolactone tablet 25 mg (has no administration in time range)   sodium chloride 0.9% flush 10 mL (has no administration in time range)   acetaminophen tablet 650 mg (has no administration in time range)   acetaminophen tablet 1,000 mg (1,000 mg Oral Given 1/18/25 1569)     Medical Decision Making  Differential includes but not limited to medication side effect, electrolyte abnormality, anemia, dehydration, volume overload,    Emergent evaluation 69-year-old female presents emergency department with Iris Mueller taking Bumex last couple days.  Patient does not know how she fell  but may have slipped in the shower with fell for unclear reason.  Patient's workup or feels that she has some acute kidney injury on top of CKD.  She has multiple abrasions and scrapes to her knees and arms.  CT scan of the brain is negative.  X-rays do not show any fracture or dislocation.  Patient is unable to walk she has some new acute kidney injury.  She will be admitted for further evaluation of symptoms may need placement in rehab facility.    A dictation software program was used for this note.  Please expect some simple typographical  errors in this note.     Amount and/or Complexity of Data Reviewed  External Data Reviewed: labs and notes.  Labs: ordered. Decision-making details documented in ED Course.  Radiology: ordered and independent interpretation performed. Decision-making details documented in ED Course.  ECG/medicine tests: ordered and independent interpretation performed. Decision-making details documented in ED Course.    Risk  OTC drugs.  Prescription drug management.  Decision regarding hospitalization.               ED Course as of 01/18/25 2039   Sat Jan 18, 2025   1410 EKG 1:55 p.m. atrial fibrillation rate of 71, no ST elevation or depression.  Nonspecific interventricular conduction delay.  No STEMI.  EKG interpreted independently by me. [JR]   1449 Hemoglobin(!): 8.1 [JR]   1449 Hematocrit(!): 28.1  Chronic anemia noted [JR]   1449 BUN(!): 27 [JR]   1449 Creatinine(!): 2.2  Mild worsening of CKD noted. [JR]   1639 He states patient just started on Bumex and has been taking it but is still overloaded.  He has been taking it since Thursday.  Re-evaluated the patient she does have bruising and abrasion to the right lateral mid humerus region.  I will order x-ray on this region. [JR]   1726 I discussed the case with Poornima from [facility] who will admit the patient. [JR]      ED Course User Index  [JR] Gilmar Esparza,                            Clinical Impression:  Final  diagnoses:  [W19.XXXA] Fall  [T14.90XA] Trauma  [R26.2] Ambulatory dysfunction  [N17.9] JEFF (acute kidney injury) (Primary)          ED Disposition Condition    Observation Stable                Gilmar Esparza DO  01/18/25 2040

## 2025-01-18 NOTE — PHARMACY MED REC
"Admission Medication History     The home medication history was taken by Boyd Mancilla.    You may go to "Admission" then "Reconcile Home Medications" tabs to review and/or act upon these items.     The home medication list has been updated by the Pharmacy department.   Please read ALL comments highlighted in yellow.   Please address this information as you see fit.    Feel free to contact us if you have any questions or require assistance.        Medications listed below were obtained from: Patient/family and Analytic software- [employer]  Current Facility-Administered Medications on File Prior to Encounter   Medication Dose Route Frequency Provider Last Rate Last Admin    lidocaine (PF) 10 mg/ml (1%) injection 5 mg  0.5 mL Intradermal Once Azeem Anderson MD         Current Outpatient Medications on File Prior to Encounter   Medication Sig Dispense Refill    amiodarone (PACERONE) 200 MG Tab Take 0.5 tablets (100 mg total) by mouth once daily. (Patient taking differently: Take 200 mg by mouth once daily.) 90 tablet 3    amLODIPine (NORVASC) 5 MG tablet Take 1 tablet (5 mg total) by mouth every evening. 90 tablet 3    apixaban (ELIQUIS) 5 mg Tab Take 1 tablet (5 mg total) by mouth 2 (two) times daily. 60 tablet 11    atorvastatin (LIPITOR) 10 MG tablet Take 1 tablet (10 mg total) by mouth every evening. 90 tablet 3    budesonide-glycopyr-formoterol (BREZTRI AEROSPHERE) 160-9-4.8 mcg/actuation HFAA Inhale 2 puffs into the lungs 2 (two) times a day.      bumetanide (BUMEX) 1 MG tablet Take 1 tablet (1 mg total) by mouth once daily. 90 tablet 4    buPROPion (WELLBUTRIN SR) 150 MG TBSR 12 hr tablet Take 1 tablet (150 mg total) by mouth 2 (two) times daily. 180 tablet 3    FLUoxetine 40 MG capsule Take 1 capsule (40 mg total) by mouth once daily. 90 capsule 3    HYDROcodone-acetaminophen (NORCO) 5-325 mg per tablet Take 1 tablet by mouth 3 (three) times daily. 90 tablet 0    iron polysaccharides (NIFEREX) 150 mg iron " Cap Take 1 capsule (150 mg total) by mouth once daily. 90 capsule 0    metoprolol succinate (TOPROL-XL) 50 MG 24 hr tablet Take 1 tablet (50 mg total) by mouth 2 (two) times daily. 180 tablet 3    mirtazapine (REMERON) 15 MG tablet Take 1 tablet (15 mg total) by mouth every evening. For sleep 30 tablet 3    spironolactone (ALDACTONE) 25 MG tablet Take 1 tablet (25 mg total) by mouth once daily. 90 tablet 3    gabapentin (NEURONTIN) 300 MG capsule Take 1 capsule (300 mg total) by mouth nightly as needed (pain). (Patient not taking: Reported on 1/18/2025) 30 capsule 0    mupirocin (BACTROBAN) 2 % ointment Apply topically 2 (two) times daily. To infected skin tear (Patient not taking: Reported on 1/18/2025) 22 g 1    potassium chloride (MICRO-K) 10 MEQ CpSR Take 1 capsule (10 mEq total) by mouth once daily. Take with furosemide (Patient taking differently: Take 10 mEq by mouth once daily. Patient is taking but no longer taking Furosemide) 30 capsule 2    semaglutide, weight loss, (WEGOVY) 0.25 mg/0.5 mL PnIj Inject 0.25 mg into the skin every 7 days. 6 mL 3    triamcinolone acetonide 0.1% (KENALOG) 0.1 % cream Apply topically 2 (two) times daily. (Patient taking differently: Apply 1 g topically 2 (two) times daily.) 60 g 2           Boyd Mancilla  EXT 1926                .

## 2025-01-18 NOTE — LETTER
2025    {enter consultant's name}  {enter consultant's address}                [place] [id number]  Phone: [phone]  Fax: [phone]   Patient: Iris Mueller   MR Number: [id number]   YOB: 1955   Date of Visit: 2025       Dear ***:    I am referring my patient, Iris Mueller, to you for evaluation of ***.    She  has a past medical history of Anemia, unspecified, Arthritis, Asthma, COPD (chronic obstructive pulmonary disease), Encounter for blood transfusion, Hypertension, Obese body habitus, and Wound infection (2019). Her  has a past surgical history that includes Tonsillectomy;  section; Knee Arthroplasty (Right, 2019); Replacement of wound vacuum-assisted closure device (Right, 2019); Joint replacement; ANGIOGRAM, CORONARY, WITH LEFT HEART CATHETERIZATION (N/A, 2022); ventriculogram, left (2022); Treatment of cardiac arrhythmia (N/A, 2023); echocardiogram,transesophageal (N/A, 2023); and Incision and drainage of hematoma (Left, 2024). She  reports that she has been smoking cigarettes. She started smoking about 50 years ago. She has a 23.8 pack-year smoking history. She has been exposed to tobacco smoke. She has never used smokeless tobacco. She reports current alcohol use. She reports that she does not use drugs.    She has a current medication list which includes the following prescription(s): amiodarone, amlodipine, apixaban, atorvastatin, breztri aerosphere, bumetanide, bupropion, fluoxetine, hydrocodone-acetaminophen, iron polysaccharides, metoprolol succinate, mirtazapine, spironolactone, gabapentin, mupirocin, potassium chloride, wegovy, and triamcinolone acetonide 0.1%, and the following Facility-Administered Medications: 0.9 % sodium chloride, acetaminophen, albuterol-ipratropium, amiodarone, apixaban, arformoterol **AND** [CANCELED] Inhalation Treatment BID, aspirin, atorvastatin,  benzonatate, budesonide, bupropion, dextromethorphan-guaifenesin  mg, fluoxetine, ipratropium, iron polysaccharides, lidocaine (pf) 10 mg/ml (1%), lorazepam, melatonin, methocarbamol, metoprolol succinate, mirtazapine, mupirocin, [START ON 1/28/2025] prednisone, and sodium chloride 0.9%. She has No Known Allergies.    I appreciate your assistance in her care and look forward to your findings and recommendations.    Sincerely,                           No name on file

## 2025-01-19 PROBLEM — G89.4 CHRONIC PAIN SYNDROME: Status: ACTIVE | Noted: 2025-01-19

## 2025-01-19 PROBLEM — N18.4 ACUTE KIDNEY INJURY SUPERIMPOSED ON STAGE 4 CHRONIC KIDNEY DISEASE: Status: ACTIVE | Noted: 2025-01-18

## 2025-01-19 PROBLEM — G93.40 ACUTE ENCEPHALOPATHY: Status: ACTIVE | Noted: 2025-01-19

## 2025-01-19 LAB
ALBUMIN SERPL BCP-MCNC: 3.7 G/DL (ref 3.5–5.2)
ALP SERPL-CCNC: 153 U/L (ref 55–135)
ALT SERPL W/O P-5'-P-CCNC: 10 U/L (ref 10–44)
AMMONIA PLAS-SCNC: 42 UMOL/L (ref 10–50)
ANION GAP SERPL CALC-SCNC: 8 MMOL/L (ref 8–16)
AST SERPL-CCNC: 19 U/L (ref 10–40)
BASOPHILS # BLD AUTO: 0.03 K/UL (ref 0–0.2)
BASOPHILS NFR BLD: 0.3 % (ref 0–1.9)
BILIRUB SERPL-MCNC: 0.9 MG/DL (ref 0.1–1)
BUN SERPL-MCNC: 23 MG/DL (ref 8–23)
CALCIUM SERPL-MCNC: 8.8 MG/DL (ref 8.7–10.5)
CHLORIDE SERPL-SCNC: 102 MMOL/L (ref 95–110)
CO2 SERPL-SCNC: 25 MMOL/L (ref 23–29)
CREAT SERPL-MCNC: 1.9 MG/DL (ref 0.5–1.4)
DIFFERENTIAL METHOD BLD: ABNORMAL
EOSINOPHIL # BLD AUTO: 0 K/UL (ref 0–0.5)
EOSINOPHIL NFR BLD: 0.3 % (ref 0–8)
ERYTHROCYTE [DISTWIDTH] IN BLOOD BY AUTOMATED COUNT: 18.8 % (ref 11.5–14.5)
EST. GFR  (NO RACE VARIABLE): 27.9 ML/MIN/1.73 M^2
FOLATE SERPL-MCNC: 6.5 NG/ML (ref 4–24)
GLUCOSE SERPL-MCNC: 98 MG/DL (ref 70–110)
HCT VFR BLD AUTO: 26.8 % (ref 37–48.5)
HGB BLD-MCNC: 7.7 G/DL (ref 12–16)
IMM GRANULOCYTES # BLD AUTO: 0.05 K/UL (ref 0–0.04)
IMM GRANULOCYTES NFR BLD AUTO: 0.6 % (ref 0–0.5)
LYMPHOCYTES # BLD AUTO: 0.4 K/UL (ref 1–4.8)
LYMPHOCYTES NFR BLD: 4.8 % (ref 18–48)
MCH RBC QN AUTO: 26.2 PG (ref 27–31)
MCHC RBC AUTO-ENTMCNC: 28.7 G/DL (ref 32–36)
MCV RBC AUTO: 91 FL (ref 82–98)
MONOCYTES # BLD AUTO: 0.5 K/UL (ref 0.3–1)
MONOCYTES NFR BLD: 5.3 % (ref 4–15)
NEUTROPHILS # BLD AUTO: 7.9 K/UL (ref 1.8–7.7)
NEUTROPHILS NFR BLD: 88.7 % (ref 38–73)
NRBC BLD-RTO: 0 /100 WBC
PLATELET # BLD AUTO: 290 K/UL (ref 150–450)
PMV BLD AUTO: 8.9 FL (ref 9.2–12.9)
POTASSIUM SERPL-SCNC: 4.8 MMOL/L (ref 3.5–5.1)
PROT SERPL-MCNC: 6.4 G/DL (ref 6–8.4)
RBC # BLD AUTO: 2.94 M/UL (ref 4–5.4)
SODIUM SERPL-SCNC: 135 MMOL/L (ref 136–145)
TROPONIN I SERPL HS-MCNC: 9.3 PG/ML (ref 0–14.9)
TSH SERPL DL<=0.005 MIU/L-ACNC: 1.37 UIU/ML (ref 0.34–5.6)
VIT B12 SERPL-MCNC: 261 PG/ML (ref 210–950)
WBC # BLD AUTO: 8.89 K/UL (ref 3.9–12.7)

## 2025-01-19 PROCEDURE — 92526 ORAL FUNCTION THERAPY: CPT

## 2025-01-19 PROCEDURE — 99900031 HC PATIENT EDUCATION (STAT)

## 2025-01-19 PROCEDURE — 25000003 PHARM REV CODE 250: Performed by: HOSPITALIST

## 2025-01-19 PROCEDURE — 27000221 HC OXYGEN, UP TO 24 HOURS

## 2025-01-19 PROCEDURE — 94799 UNLISTED PULMONARY SVC/PX: CPT

## 2025-01-19 PROCEDURE — 99900035 HC TECH TIME PER 15 MIN (STAT)

## 2025-01-19 PROCEDURE — 25000003 PHARM REV CODE 250: Performed by: NURSE PRACTITIONER

## 2025-01-19 PROCEDURE — 99406 BEHAV CHNG SMOKING 3-10 MIN: CPT

## 2025-01-19 PROCEDURE — G0378 HOSPITAL OBSERVATION PER HR: HCPCS

## 2025-01-19 PROCEDURE — 82607 VITAMIN B-12: CPT | Performed by: NURSE PRACTITIONER

## 2025-01-19 PROCEDURE — 36415 COLL VENOUS BLD VENIPUNCTURE: CPT | Performed by: NURSE PRACTITIONER

## 2025-01-19 PROCEDURE — 84484 ASSAY OF TROPONIN QUANT: CPT | Performed by: INTERNAL MEDICINE

## 2025-01-19 PROCEDURE — 85025 COMPLETE CBC W/AUTO DIFF WBC: CPT | Performed by: NURSE PRACTITIONER

## 2025-01-19 PROCEDURE — 82746 ASSAY OF FOLIC ACID SERUM: CPT | Performed by: NURSE PRACTITIONER

## 2025-01-19 PROCEDURE — 86593 SYPHILIS TEST NON-TREP QUANT: CPT | Performed by: NURSE PRACTITIONER

## 2025-01-19 PROCEDURE — 94761 N-INVAS EAR/PLS OXIMETRY MLT: CPT

## 2025-01-19 PROCEDURE — 94640 AIRWAY INHALATION TREATMENT: CPT | Mod: XB

## 2025-01-19 PROCEDURE — 84425 ASSAY OF VITAMIN B-1: CPT | Performed by: NURSE PRACTITIONER

## 2025-01-19 PROCEDURE — 97530 THERAPEUTIC ACTIVITIES: CPT

## 2025-01-19 PROCEDURE — 84207 ASSAY OF VITAMIN B-6: CPT | Performed by: NURSE PRACTITIONER

## 2025-01-19 PROCEDURE — 97162 PT EVAL MOD COMPLEX 30 MIN: CPT

## 2025-01-19 PROCEDURE — 97165 OT EVAL LOW COMPLEX 30 MIN: CPT

## 2025-01-19 PROCEDURE — 92610 EVALUATE SWALLOWING FUNCTION: CPT

## 2025-01-19 PROCEDURE — 80053 COMPREHEN METABOLIC PANEL: CPT | Performed by: NURSE PRACTITIONER

## 2025-01-19 PROCEDURE — 36415 COLL VENOUS BLD VENIPUNCTURE: CPT | Performed by: INTERNAL MEDICINE

## 2025-01-19 PROCEDURE — 82140 ASSAY OF AMMONIA: CPT | Performed by: NURSE PRACTITIONER

## 2025-01-19 PROCEDURE — 84443 ASSAY THYROID STIM HORMONE: CPT | Performed by: NURSE PRACTITIONER

## 2025-01-19 PROCEDURE — 25000242 PHARM REV CODE 250 ALT 637 W/ HCPCS: Performed by: HOSPITALIST

## 2025-01-19 RX ORDER — IPRATROPIUM BROMIDE AND ALBUTEROL SULFATE 2.5; .5 MG/3ML; MG/3ML
3 SOLUTION RESPIRATORY (INHALATION) EVERY 4 HOURS PRN
Status: DISCONTINUED | OUTPATIENT
Start: 2025-01-19 | End: 2025-01-28 | Stop reason: HOSPADM

## 2025-01-19 RX ADMIN — ACETAMINOPHEN 650 MG: 325 TABLET ORAL at 06:01

## 2025-01-19 RX ADMIN — MUPIROCIN 1 G: 20 OINTMENT TOPICAL at 08:01

## 2025-01-19 RX ADMIN — HYDROCODONE BITARTRATE AND ACETAMINOPHEN 1 TABLET: 5; 325 TABLET ORAL at 04:01

## 2025-01-19 RX ADMIN — ACETAMINOPHEN 650 MG: 325 TABLET ORAL at 08:01

## 2025-01-19 RX ADMIN — AMIODARONE HYDROCHLORIDE 200 MG: 200 TABLET ORAL at 08:01

## 2025-01-19 RX ADMIN — MIRTAZAPINE 15 MG: 15 TABLET, FILM COATED ORAL at 09:01

## 2025-01-19 RX ADMIN — IPRATROPIUM BROMIDE 0.5 MG: 0.5 SOLUTION RESPIRATORY (INHALATION) at 07:01

## 2025-01-19 RX ADMIN — BUDESONIDE INHALATION 0.5 MG: 0.5 SUSPENSION RESPIRATORY (INHALATION) at 07:01

## 2025-01-19 RX ADMIN — APIXABAN 5 MG: 5 TABLET, FILM COATED ORAL at 09:01

## 2025-01-19 RX ADMIN — FLUOXETINE HYDROCHLORIDE 40 MG: 20 CAPSULE ORAL at 08:01

## 2025-01-19 RX ADMIN — BUPROPION HYDROCHLORIDE 150 MG: 150 TABLET, FILM COATED, EXTENDED RELEASE ORAL at 09:01

## 2025-01-19 RX ADMIN — MUPIROCIN 1 G: 20 OINTMENT TOPICAL at 09:01

## 2025-01-19 RX ADMIN — METOPROLOL SUCCINATE 50 MG: 50 TABLET, FILM COATED, EXTENDED RELEASE ORAL at 09:01

## 2025-01-19 RX ADMIN — METOPROLOL SUCCINATE 50 MG: 50 TABLET, FILM COATED, EXTENDED RELEASE ORAL at 08:01

## 2025-01-19 RX ADMIN — APIXABAN 5 MG: 5 TABLET, FILM COATED ORAL at 08:01

## 2025-01-19 RX ADMIN — ARFORMOTEROL TARTRATE 15 MCG: 15 SOLUTION RESPIRATORY (INHALATION) at 07:01

## 2025-01-19 RX ADMIN — IPRATROPIUM BROMIDE 0.5 MG: 0.5 SOLUTION RESPIRATORY (INHALATION) at 02:01

## 2025-01-19 RX ADMIN — BUPROPION HYDROCHLORIDE 150 MG: 150 TABLET, FILM COATED, EXTENDED RELEASE ORAL at 08:01

## 2025-01-19 RX ADMIN — ATORVASTATIN CALCIUM 10 MG: 10 TABLET, FILM COATED ORAL at 09:01

## 2025-01-19 NOTE — SUBJECTIVE & OBJECTIVE
Past Medical History:   Diagnosis Date    Anemia, unspecified     Arthritis     Asthma     COPD (chronic obstructive pulmonary disease)     Encounter for blood transfusion     Hypertension     Obese body habitus     Wound infection 2019    Right knee       Past Surgical History:   Procedure Laterality Date    ANGIOGRAM, CORONARY, WITH LEFT HEART CATHETERIZATION N/A 2022    Procedure: Angiogram, Coronary, with Left Heart Cath;  Surgeon: Jorge Tran MD;  Location: [facility] CATH/EP LAB;  Service: Cardiology;  Laterality: N/A;     SECTION      ECHOCARDIOGRAM,TRANSESOPHAGEAL N/A 2023    Procedure: Transesophageal echo (MARIANO) intra-procedure log documentation;  Surgeon: Provider, Dos Diagnostic;  Location: [facility] EP LAB;  Service: Cardiology;  Laterality: N/A;    INCISION AND DRAINAGE OF HEMATOMA Left 2024    Procedure: INCISION AND DRAINAGE, HEMATOMA;  Surgeon: Bryson Huerta MD;  Location: [facility] OR;  Service: Orthopedics;  Laterality: Left;    JOINT REPLACEMENT      Knee    KNEE ARTHROPLASTY Right 2019    Procedure: ARTHROPLASTY, KNEE;  Surgeon: Delio Chung MD;  Location: [facility] OR;  Service: Orthopedics;  Laterality: Right;  anesthesia:  general and block    REPLACEMENT OF WOUND VACUUM-ASSISTED CLOSURE DEVICE Right 2019    Procedure: REPLACEMENT, WOUND VAC;  Surgeon: Delio Chung MD;  Location: [facility] OR;  Service: Orthopedics;  Laterality: Right;    TONSILLECTOMY      TREATMENT OF CARDIAC ARRHYTHMIA N/A 2023    Procedure: Cardioversion or Defibrillation;  Surgeon: PJ Betancourt MD;  Location: [facility] EP LAB;  Service: Cardiology;  Laterality: N/A;  AF, MARIANO, DCCV, MAC, EH, 3 Prep    VENTRICULOGRAM, LEFT  2022    Procedure: Ventriculogram, Left;  Surgeon: Jorge Tran MD;  Location: [facility] CATH/EP LAB;  Service: Cardiology;;       Review of patient's allergies indicates:  No Known Allergies    Current Facility-Administered Medications on File Prior to  Encounter   Medication    lidocaine (PF) 10 mg/ml (1%) injection 5 mg     Current Outpatient Medications on File Prior to Encounter   Medication Sig    amiodarone (PACERONE) 200 MG Tab Take 0.5 tablets (100 mg total) by mouth once daily. (Patient taking differently: Take 200 mg by mouth once daily.)    amLODIPine (NORVASC) 5 MG tablet Take 1 tablet (5 mg total) by mouth every evening.    apixaban (ELIQUIS) 5 mg Tab Take 1 tablet (5 mg total) by mouth 2 (two) times daily.    atorvastatin (LIPITOR) 10 MG tablet Take 1 tablet (10 mg total) by mouth every evening.    budesonide-glycopyr-formoterol (BREZTRI AEROSPHERE) 160-9-4.8 mcg/actuation HFAA Inhale 2 puffs into the lungs 2 (two) times a day.    bumetanide (BUMEX) 1 MG tablet Take 1 tablet (1 mg total) by mouth once daily.    buPROPion (WELLBUTRIN SR) 150 MG TBSR 12 hr tablet Take 1 tablet (150 mg total) by mouth 2 (two) times daily.    FLUoxetine 40 MG capsule Take 1 capsule (40 mg total) by mouth once daily.    HYDROcodone-acetaminophen (NORCO) 5-325 mg per tablet Take 1 tablet by mouth 3 (three) times daily.    iron polysaccharides (NIFEREX) 150 mg iron Cap Take 1 capsule (150 mg total) by mouth once daily.    metoprolol succinate (TOPROL-XL) 50 MG 24 hr tablet Take 1 tablet (50 mg total) by mouth 2 (two) times daily.    mirtazapine (REMERON) 15 MG tablet Take 1 tablet (15 mg total) by mouth every evening. For sleep    spironolactone (ALDACTONE) 25 MG tablet Take 1 tablet (25 mg total) by mouth once daily.    gabapentin (NEURONTIN) 300 MG capsule Take 1 capsule (300 mg total) by mouth nightly as needed (pain). (Patient not taking: Reported on 1/18/2025)    mupirocin (BACTROBAN) 2 % ointment Apply topically 2 (two) times daily. To infected skin tear (Patient not taking: Reported on 1/18/2025)    potassium chloride (MICRO-K) 10 MEQ CpSR Take 1 capsule (10 mEq total) by mouth once daily. Take with furosemide (Patient taking differently: Take 10 mEq by mouth once  daily. Patient is taking but no longer taking Furosemide)    semaglutide, weight loss, (WEGOVY) 0.25 mg/0.5 mL PnIj Inject 0.25 mg into the skin every 7 days.    triamcinolone acetonide 0.1% (KENALOG) 0.1 % cream Apply topically 2 (two) times daily. (Patient taking differently: Apply 1 g topically 2 (two) times daily.)     Family History       Problem Relation (Age of Onset)    Alzheimer's disease Mother          Tobacco Use    Smoking status: Every Day     Current packs/day: 0.25     Average packs/day: 0.5 packs/day for 50.0 years (23.8 ttl pk-yrs)     Types: Cigarettes     Start date: 1975     Passive exposure: Current    Smokeless tobacco: Never    Tobacco comments:     Pt states she has been quit now for a couple weeks, plans to stay quit.   Substance and Sexual Activity    Alcohol use: Yes     Comment: RARELY    Drug use: Never    Sexual activity: Not Currently     Partners: Male     Review of Systems   Constitutional: Negative.    HENT: Negative.     Eyes: Negative.    Respiratory: Negative.     Cardiovascular: Negative.    Gastrointestinal: Negative.    Musculoskeletal:  Positive for myalgias.   Skin:  Positive for wound.   Neurological:  Positive for syncope and light-headedness.   Psychiatric/Behavioral: Negative.       Objective:     Vital Signs (Most Recent):  Temp: 97.7 °F (36.5 °C) (01/18/25 1334)  Pulse: 69 (01/18/25 1904)  Resp: 17 (01/18/25 1904)  BP: 113/63 (01/18/25 1904)  SpO2: 96 % (01/18/25 1904) Vital Signs (24h Range):  Temp:  [97.7 °F (36.5 °C)] 97.7 °F (36.5 °C)  Pulse:  [68-76] 69  Resp:  [16-20] 17  SpO2:  [94 %-98 %] 96 %  BP: (108-127)/(58-77) 113/63     Weight: 110.2 kg (243 lb)  Body mass index is 43.05 kg/m².     Physical Exam  Vitals reviewed.   Constitutional:       General: She is not in acute distress.     Appearance: Normal appearance. She is obese. She is not ill-appearing.   HENT:      Head: Normocephalic and atraumatic.   Eyes:      Pupils: Pupils are equal, round, and  reactive to light.   Cardiovascular:      Rate and Rhythm: Normal rate. Rhythm irregularly irregular.   Abdominal:      General: Bowel sounds are normal.      Tenderness: There is no abdominal tenderness.   Musculoskeletal:      Cervical back: Normal range of motion and neck supple.   Skin:     General: Skin is warm.      Capillary Refill: Capillary refill takes less than 2 seconds.      Comments: Right arm skin tear to upper and lower right arm   Neurological:      Mental Status: She is alert.      GCS: GCS eye subscore is 4. GCS verbal subscore is 5. GCS motor subscore is 6.      Cranial Nerves: Dysarthria: speech slurred.      Sensory: Sensation is intact.      Motor: Weakness present.      Comments: Generalized weakness   Psychiatric:         Mood and Affect: Mood normal.              CRANIAL NERVES     CN III, IV, VI   Pupils are equal, round, and reactive to light.       Significant Labs: All pertinent labs within the past 24 hours have been reviewed.  Recent Lab Results         01/18/25  1838   01/18/25  1400        Albumin   4.0       ALP   174       ALT   13       Anion Gap   9       Appearance, UA Clear         AST   17       Baso #   0.04       Basophil %   0.5       Bilirubin (UA) Negative         BILIRUBIN TOTAL   0.6  Comment: For infants and newborns, interpretation of results should be based  on gestational age, weight and in agreement with clinical  observations.    Premature Infant recommended reference ranges:  Up to 24 hours.............<8.0 mg/dL  Up to 48 hours............<12.0 mg/dL  3-5 days..................<15.0 mg/dL  6-29 days.................<15.0 mg/dL         BUN   27       Calcium   8.7       Chloride   103       CO2   27       Color, UA Yellow         Creatinine   2.2       Differential Method   Automated       eGFR   23.4       Eos #   0.2       Eos %   2.5       Glucose   93       Glucose, UA Negative         Gran # (ANC)   7.0       Gran %   84.3       Hematocrit   28.1        Hemoglobin   8.1       Immature Grans (Abs)   0.10  Comment: Mild elevation in immature granulocytes is non specific and   can be seen in a variety of conditions including stress response,   acute inflammation, trauma and pregnancy. Correlation with other   laboratory and clinical findings is essential.         Immature Granulocytes   1.2       Ketones, UA Negative         Leukocyte Esterase, UA Negative         Lymph #   0.5       Lymph %   6.5       MCH   26.5       MCHC   28.8       MCV   92       Mono #   0.4       Mono %   5.0       MPV   8.8       NITRITE UA Negative         nRBC   0       Blood, UA Negative         pH, UA 7.0         Platelet Count   300       Potassium   4.3       PROTEIN TOTAL   6.7       Protein, UA Negative  Comment: Recommend a 24 hour urine protein or a urine   protein/creatinine ratio if globulin induced proteinuria is  clinically suspected.           RBC   3.06       RDW   19.1       Sodium   139       Spec Grav UA 1.010         Specimen UA Urine, Clean Catch         UROBILINOGEN UA Negative         WBC   8.27               Significant Imaging: I have reviewed all pertinent imaging results/findings within the past 24 hours.  Imaging Results              X-Ray Humerus 2 View Right (Final result)  Result time 01/18/25 17:09:36      Final result by Iraida Millan MD (01/18/25 17:09:36)                   Impression:      Negative right shoulder      Electronically signed by: Iraida Millan  Date:    01/18/2025  Time:    17:09               Narrative:    EXAMINATION:  XR HUMERUS 2 VIEW RIGHT    CLINICAL HISTORY:  Injury, unspecified, initial encounter    FINDINGS:  Two views right shoulder show no fracture, dislocation, or destructive osseous lesion. Soft tissues are unremarkable.                                       CT Head Without Contrast (Final result)  Result time 01/18/25 14:52:25      Final result by Iraida Millan MD (01/18/25 14:52:25)                   Impression:       No acute intracranial abnormalities. Chronic findings as discussed above.      Electronically signed by: Iraida Millan  Date:    01/18/2025  Time:    14:52               Narrative:    EXAMINATION:  CT HEAD WITHOUT CONTRAST    CLINICAL HISTORY:  Head trauma, minor (Age >= 65y);.    TECHNIQUE:  Noninfusion images were obtained from the skull base to the vertex.    All CT scans at this facility utilize dose modulation, iterative reconstruction, and/or weight based dosing when appropriate to reduce radiation dose to as low as reasonably achievable    CMS MANDATED QUALITY DATA - CT RADIATION - 436    COMPARISON:  12/07/2024    FINDINGS:  There is no intracranial mass, hemorrhage, or midline shift. Ventricles and sulci are mildly prominent. There are no pathologic extra-axial fluid collections.    There is no evidence of cortical ischemic change. Changes of mild chronic microvascular white matter disease are noted. Cerebellum and brainstem are normal. The calvarium is intact.                                       X-Ray Pelvis Routine AP (Final result)  Result time 01/18/25 15:09:20      Final result by Iraida Millan MD (01/18/25 15:09:20)                   Impression:      Negative pelvis      Electronically signed by: Iraida Millan  Date:    01/18/2025  Time:    15:09               Narrative:    CLINICAL HISTORY:  (MRN [id number])68 y/o  (1955) F    trauma;    TECHNIQUE:  (A# [id number], Exam time 1/18/2025 14:31)    [id number] XR PELVIS ROUTINE AP  view(s) obtained.    COMPARISON:  None available.    FINDINGS:  Two views of the pelvis    There are no fractures, dislocations or acute osseous abnormalities.  The soft tissues are normal.  There is peripheral vascular calcification.                                       X-Ray Knee 3 View Bilateral (Final result)  Result time 01/18/25 15:08:33      Final result by Iraida Millan MD (01/18/25 15:08:33)                   Impression:      No acute osseous  abnormality    Total right knee arthroplasty without complication    Diffuse soft tissue edema      Electronically signed by: Iraida Millan  Date:    01/18/2025  Time:    15:08               Narrative:    EXAMINATION:  XR KNEE 3 VIEW BILATERAL    CLINICAL HISTORY:  Unspecified fall, initial encounter    FINDINGS:  Two views of the bilateral knees demonstrates a total right knee arthroplasty in satisfactory alignment.  There are no fractures or acute osseous abnormalities.    Two views of the left knee demonstrates mild tricompartmental joint space narrowing.  There are no fractures or acute osseous abnormalities.    There is no suprapatellar joint effusion.  There is diffuse soft tissue edema.                                       X-Ray Chest 1 View (Final result)  Result time 01/18/25 14:53:04      Final result by Iraida Millan MD (01/18/25 14:53:04)                   Impression:      Cardiomegaly with no acute pulmonary process      Electronically signed by: Iraida Millan  Date:    01/18/2025  Time:    14:53               Narrative:    EXAMINATION:  XR CHEST 1 VIEW    CLINICAL HISTORY:  Unspecified fall, initial encounter    FINDINGS:  Portable chest x-ray at 14:10 hours is compared to prior study 12/07/2024    The heart is enlarged.  The lungs are clear.  There are no acute osseous abnormalities.

## 2025-01-19 NOTE — SUBJECTIVE & OBJECTIVE
Interval History:  Lying in bed.  Awake and alert.  Disoriented, speech garbled.  Appears encephalopathic.  Requiring supplemental O2. Family at bedside endorsed that patient is usually ambulatory and lucid.  Renal function improved.  Workup unremarkable, except carotid ultrasound shows bilateral 50-69% carotid stenosis.  On review of records, patient was admitted for AMS and had a CVA workup and workup was negative for stroke.  She has a history of moderate to severe AS and was evaluated by cardiologist, Dr. Ghosh on 01/16/2025.  Family endorses that she has been taking sips of water as well as swallowed her meds this morning without issues. PT/OT pending.  Workup continues to be in progress    Review of Systems   Unable to perform ROS: Mental status change     Objective:     Vital Signs (Most Recent):  Temp: 98.7 °F (37.1 °C) (01/19/25 1059)  Pulse: 76 (01/19/25 1059)  Resp: 18 (01/19/25 1059)  BP: 106/73 (01/19/25 1059)  SpO2: (!) 91 % (01/19/25 1059) Vital Signs (24h Range):  Temp:  [97.5 °F (36.4 °C)-98.7 °F (37.1 °C)] 98.7 °F (37.1 °C)  Pulse:  [68-98] 76  Resp:  [16-20] 18  SpO2:  [89 %-98 %] 91 %  BP: (106-128)/(58-89) 106/73     Weight: 110.2 kg (243 lb)  Body mass index is 43.05 kg/m².    Intake/Output Summary (Last 24 hours) at 1/19/2025 1127  Last data filed at 1/19/2025 0011  Gross per 24 hour   Intake 0 ml   Output --   Net 0 ml         Physical Exam  Constitutional:       General: She is not in acute distress.     Appearance: She is obese.   Eyes:      Extraocular Movements: Extraocular movements intact.      Pupils: Pupils are equal, round, and reactive to light.   Cardiovascular:      Rate and Rhythm: Normal rate. Rhythm irregular.      Pulses: Normal pulses.      Heart sounds: Murmur heard.      Comments: AFib on telemetry  Pulmonary:      Effort: Pulmonary effort is normal. No respiratory distress.      Breath sounds: Rales (Expiratory rales bilaterally, throughout) present.      Comments: O2 via NC  "donned.  Abdominal:      General: Bowel sounds are normal. There is no distension.      Palpations: Abdomen is soft.      Tenderness: There is no abdominal tenderness.   Musculoskeletal:         General: Normal range of motion.      Cervical back: Normal range of motion and neck supple.      Right lower leg: Pitting Edema (1+-2+) present.      Left lower leg: Pitting Edema (1+ to 2+) present.   Skin:     General: Skin is warm and dry.   Neurological:      Mental Status: She is alert. She is confused.      Cranial Nerves: Dysarthria present.      Motor: Weakness (Generalized) present.      Comments: Speech incomprehensible   Psychiatric:         Behavior: Behavior is cooperative.             Significant Labs: All pertinent labs within the past 24 hours have been reviewed.  CBC:   Recent Labs   Lab 01/18/25  1400 01/19/25  0541   WBC 8.27 8.89   HGB 8.1* 7.7*   HCT 28.1* 26.8*    290     CMP:   Recent Labs   Lab 01/18/25  1400 01/19/25  0541    135*   K 4.3 4.8    102   CO2 27 25   GLU 93 98   BUN 27* 23   CREATININE 2.2* 1.9*   CALCIUM 8.7 8.8   PROT 6.7 6.4   ALBUMIN 4.0 3.7   BILITOT 0.6 0.9   ALKPHOS 174* 153*   AST 17 19   ALT 13 10   ANIONGAP 9 8     Cardiac Markers: No results for input(s): "CKMB", "MYOGLOBIN", "BNP", "TROPISTAT" in the last 48 hours.  Troponin:   Recent Labs   Lab 01/19/25  0008   TROPONINIHS 9.3     Urine Studies:   Recent Labs   Lab 01/18/25  1838   COLORU Yellow   APPEARANCEUA Clear   PHUR 7.0   SPECGRAV 1.010   PROTEINUA Negative   GLUCUA Negative   KETONESU Negative   BILIRUBINUA Negative   OCCULTUA Negative   NITRITE Negative   UROBILINOGEN Negative   LEUKOCYTESUR Negative     US Carotid Bilateral  Narrative: EXAMINATION:  US CAROTID BILATERAL    CLINICAL HISTORY:  Syncope;    TECHNIQUE:  Grayscale and color Doppler ultrasound examination of the carotid and vertebral artery systems bilaterally.  Stenosis estimates are per the NASCET measurement " criteria.    COMPARISON:  None.    FINDINGS:  Right:    Internal Carotid Artery (ICA) peak systolic velocity 146.1 cm/sec    ICA/CCA peak systolic velocity ratio: 2.3    Plaque formation: Heterogeneous    Vertebral artery: Antegrade flow and normal waveform.    Left:    Internal Carotid Artery (ICA)  peak systolic velocity 149 cm/sec    ICA/CCA peak systolic velocity ratio: 3.6    Plaque formation: Heterogeneous    Vertebral artery: Antegrade flow and normal waveform.  Impression: 50-69% stenosis of the right ICA.    50-69% stenosis of the left ICA.    Electronically signed by: Marce Narvaez  Date:    01/19/2025  Time:    02:52        Significant Imaging: I have reviewed all pertinent imaging results/findings within the past 24 hours.

## 2025-01-19 NOTE — ED NOTES
Report called to RACHANA Stewart. Telemetry monitor on/ verified. VSS. Pt transferred to unit at this time.

## 2025-01-19 NOTE — ASSESSMENT & PLAN NOTE
Body mass index is 43.05 kg/m². Morbid obesity complicates all aspects of disease management from diagnostic modalities to treatment. Weight loss encouraged and health benefits explained to patient.

## 2025-01-19 NOTE — ASSESSMENT & PLAN NOTE
Patient's COPD is controlled currently.  Patient is currently off COPD Pathway. Continue scheduled inhalers  nebulizers and Supplemental oxygen and monitor respiratory status closely.   Wean supplemental O2 as tolerated

## 2025-01-19 NOTE — PLAN OF CARE
SW attempted dc assessment; Pt with nurse and therapy    01/19/25 6350   Discharge Assessment   Assessment Type Discharge Planning Assessment

## 2025-01-19 NOTE — ASSESSMENT & PLAN NOTE
JEFF is likely due to pre-renal azotemia due to dehydration. Baseline creatinine is  1.7- 1.9 . Most recent creatinine and eGFR are listed below.  Recent Labs     01/18/25  1400 01/19/25  0541   CREATININE 2.2* 1.9*   EGFRNORACEVR 23.4* 27.9*        Plan  - very mild/ JEFF is  essentially resolved and creatinine close to baseline  - Avoid nephrotoxins and renally dose meds for GFR listed above  - Monitor urine output, serial BMP, and adjust therapy as needed

## 2025-01-19 NOTE — ASSESSMENT & PLAN NOTE
JEFF is likely due to pre-renal azotemia due to dehydration. Baseline creatinine is  1.9 . Most recent creatinine and eGFR are listed below.  Recent Labs     01/18/25  1400   CREATININE 2.2*   EGFRNORACEVR 23.4*      Plan  - JEFF is worsening. Will continue current treatment  - Avoid nephrotoxins and renally dose meds for GFR listed above  - Monitor urine output, serial BMP, and adjust therapy as needed

## 2025-01-19 NOTE — ASSESSMENT & PLAN NOTE
Patient has persistent (7 days or more) atrial fibrillation. Patient is currently in atrial fibrillation. [id number] Score: 3. The patients heart rate in the last 24 hours is as follows:  Pulse  Min: 68  Max: 98     Antiarrhythmics  amiodarone tablet 200 mg, Daily, Oral  metoprolol succinate (TOPROL-XL) 24 hr tablet 50 mg, 2 times daily, Oral    Anticoagulants  apixaban tablet 5 mg, 2 times daily, Oral    Plan  - Replete lytes with a goal of K>4, Mg >2  - Patient is anticoagulated, see medications listed above.  - Patient's afib is currently controlled

## 2025-01-19 NOTE — ASSESSMENT & PLAN NOTE
Echocardiogram with evidence of aortic stenosis that is severe . The patient's most recent echocardiogram result is listed below. We will manage the valvular abnormality by monitor for volume overload    Echo Saline Bubble? No    Result Date: 12/8/2024    Left Ventricle: The left ventricle is normal in size. Moderately   increased wall thickness. Unable to assess wall motion. There is normal   systolic function with a visually estimated ejection fraction of 55 - 60%.   Unable to assess diastolic function due to atrial fibrillation.    Right Ventricle: Right ventricle was not well visualized due to poor   acoustic window.    Left Atrium: Left atrium is severely dilated.    Right Atrium: Right atrium is severely dilated.    Aortic Valve: There is moderate to severe stenosis. Aortic valve area   by VTI is 1.0 cm². Aortic valve peak velocity is 3.4 m/s. Mean gradient is   27.0 mmHg. The dimensionless index is 0.32. There is mild aortic   regurgitation.    IVC/SVC: Elevated venous pressure at 15 mmHg.        ===========================================  Following with Dr. Ghosh for valve workup  Monitor for volume overload

## 2025-01-19 NOTE — ASSESSMENT & PLAN NOTE
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 CHF is currently controlled. Latest ECHO performed and demonstrates- Results for orders placed during the hospital encounter of 12/07/24    Echo Saline Bubble? No    Interpretation Summary    Left Ventricle: The left ventricle is normal in size. Moderately increased wall thickness. Unable to assess wall motion. There is normal systolic function with a visually estimated ejection fraction of 55 - 60%. Unable to assess diastolic function due to atrial fibrillation.    Right Ventricle: Right ventricle was not well visualized due to poor acoustic window.    Left Atrium: Left atrium is severely dilated.    Right Atrium: Right atrium is severely dilated.    Aortic Valve: There is moderate to severe stenosis. Aortic valve area by VTI is 1.0 cm². Aortic valve peak velocity is 3.4 m/s. Mean gradient is 27.0 mmHg. The dimensionless index is 0.32. There is mild aortic regurgitation.    IVC/SVC: Elevated venous pressure at 15 mmHg.  . Continue Beta Blocker and monitor clinical status closely. Monitor on telemetry. Patient is off CHF pathway.  Monitor strict Is&Os and daily weights.  Not on fluid restriction. Cardiology is not consulted. Continue to stress to patient importance of self efficacy and  on diet for CHF. Last BNP reviewed- and noted below   Component Ref Range & Units 13 d ago  (1/6/25)   BNP 0 - 99 pg/mL 1,065   .  ===================================  Troponin normal   Appears euvolemic currently   Holding chronic diuretics due to JEFF/ not on chronic ACEI/ARB  Monitor for volume overload

## 2025-01-19 NOTE — ASSESSMENT & PLAN NOTE
Possibly medication induced (on hydrocodone and gabapentin) vs neurologic etiology vs cardiogenic/aortic stenosis contributing  =================================  CT of head negative  UA negative for infection   Last TTE 12/08/2024: EF 55-60%, indeterminate diastolic function, moderate to severe AS  Orthostatics unable to be obtained due to AMS  PT/OT pending   Holding hydrcodone and gabapentin  Maintain fall/syncope precautions

## 2025-01-19 NOTE — H&P
[facility] - Emergency Dept  Hospital Medicine  History & Physical    Patient Name: Iris Mueller  MRN: [id number]  Patient Class: OP- Observation  Admission Date: 1/18/2025  Attending Physician: Yolanda Early MD   Primary Care Provider: Elkin You MD         Patient information was obtained from patient, past medical records, and ER records.     Subjective:     Principal Problem:JEFF (acute kidney injury)    Chief Complaint:   Chief Complaint   Patient presents with    Fall     Fell after getting out of shower. No loc, hurting all over        HPI: 69-year-old female with a past medical history of COPD asthma, HTN, obesity, CHF, Right knee wound infection, arthritis who presented to the emergency room for  syncope and fall.  She reports just prior to fall and she felt dizzy.  She is unaware of the events of the fall.  Patient has had frequent falls recently. She fell getting out of the shower. She is not entirely sure what happened. She is not sure why she fell. She has pain all over. Mostly complains of pain to her bilateral knees and to her chest. She says that she does not know why she fell but she was in the shower when it happened. She does not think that she slipped a she does take Norco 3 times a day and gabapentin.     In the ER, patient atrial fibrillation,t mild anemia, with H&H 8.7 and 28.1, JEFF with BUN 27 creatinine 2.2, baseline creatinine is 1.81., L dysphagia is 174, UA negative for infection CT head nonacute, right humerus x-ray nonacute, pelvic x-ray no acute fractures, right knee with diffuse soft tissue edema, chest x-ray nonacute with some cardiomegaly    Admit to hospital medicine for syncope workup consult PT/OT , JEFF    I spoke to daughter who states patient recently was seen by cardiologist who stopped 1 of her blood pressure medicines but daughter is concerned patient did not stopped taking the blood pressure medication and her blood pressure  dropped    Past Medical History:   Diagnosis Date    Anemia, unspecified     Arthritis     Asthma     COPD (chronic obstructive pulmonary disease)     Encounter for blood transfusion     Hypertension     Obese body habitus     Wound infection 2019    Right knee       Past Surgical History:   Procedure Laterality Date    ANGIOGRAM, CORONARY, WITH LEFT HEART CATHETERIZATION N/A 2022    Procedure: Angiogram, Coronary, with Left Heart Cath;  Surgeon: Jorge Tran MD;  Location: [facility] CATH/EP LAB;  Service: Cardiology;  Laterality: N/A;     SECTION      ECHOCARDIOGRAM,TRANSESOPHAGEAL N/A 2023    Procedure: Transesophageal echo (MARIANO) intra-procedure log documentation;  Surgeon: Provider, [facility];  Location: [facility] EP LAB;  Service: Cardiology;  Laterality: N/A;    INCISION AND DRAINAGE OF HEMATOMA Left 2024    Procedure: INCISION AND DRAINAGE, HEMATOMA;  Surgeon: Bryson Huerta MD;  Location: [facility] OR;  Service: Orthopedics;  Laterality: Left;    JOINT REPLACEMENT      Knee    KNEE ARTHROPLASTY Right 2019    Procedure: ARTHROPLASTY, KNEE;  Surgeon: Delio Chung MD;  Location: [facility] OR;  Service: Orthopedics;  Laterality: Right;  anesthesia:  general and block    REPLACEMENT OF WOUND VACUUM-ASSISTED CLOSURE DEVICE Right 2019    Procedure: REPLACEMENT, WOUND VAC;  Surgeon: eDlio Chung MD;  Location: [facility] OR;  Service: Orthopedics;  Laterality: Right;    TONSILLECTOMY      TREATMENT OF CARDIAC ARRHYTHMIA N/A 2023    Procedure: Cardioversion or Defibrillation;  Surgeon: PJ Betancourt MD;  Location: [facility] EP LAB;  Service: Cardiology;  Laterality: N/A;  AF, MARIANO, DCCV, MAC, EH, 3 Prep    VENTRICULOGRAM, LEFT  2022    Procedure: Ventriculogram, Left;  Surgeon: Jorge Tran MD;  Location: [facility] CATH/EP LAB;  Service: Cardiology;;       Review of patient's allergies indicates:  No Known Allergies    Current Facility-Administered Medications on File Prior  to Encounter   Medication    lidocaine (PF) 10 mg/ml (1%) injection 5 mg     Current Outpatient Medications on File Prior to Encounter   Medication Sig    amiodarone (PACERONE) 200 MG Tab Take 0.5 tablets (100 mg total) by mouth once daily. (Patient taking differently: Take 200 mg by mouth once daily.)    amLODIPine (NORVASC) 5 MG tablet Take 1 tablet (5 mg total) by mouth every evening.    apixaban (ELIQUIS) 5 mg Tab Take 1 tablet (5 mg total) by mouth 2 (two) times daily.    atorvastatin (LIPITOR) 10 MG tablet Take 1 tablet (10 mg total) by mouth every evening.    budesonide-glycopyr-formoterol (BREZTRI AEROSPHERE) 160-9-4.8 mcg/actuation HFAA Inhale 2 puffs into the lungs 2 (two) times a day.    bumetanide (BUMEX) 1 MG tablet Take 1 tablet (1 mg total) by mouth once daily.    buPROPion (WELLBUTRIN SR) 150 MG TBSR 12 hr tablet Take 1 tablet (150 mg total) by mouth 2 (two) times daily.    FLUoxetine 40 MG capsule Take 1 capsule (40 mg total) by mouth once daily.    HYDROcodone-acetaminophen (NORCO) 5-325 mg per tablet Take 1 tablet by mouth 3 (three) times daily.    iron polysaccharides (NIFEREX) 150 mg iron Cap Take 1 capsule (150 mg total) by mouth once daily.    metoprolol succinate (TOPROL-XL) 50 MG 24 hr tablet Take 1 tablet (50 mg total) by mouth 2 (two) times daily.    mirtazapine (REMERON) 15 MG tablet Take 1 tablet (15 mg total) by mouth every evening. For sleep    spironolactone (ALDACTONE) 25 MG tablet Take 1 tablet (25 mg total) by mouth once daily.    gabapentin (NEURONTIN) 300 MG capsule Take 1 capsule (300 mg total) by mouth nightly as needed (pain). (Patient not taking: Reported on 1/18/2025)    mupirocin (BACTROBAN) 2 % ointment Apply topically 2 (two) times daily. To infected skin tear (Patient not taking: Reported on 1/18/2025)    potassium chloride (MICRO-K) 10 MEQ CpSR Take 1 capsule (10 mEq total) by mouth once daily. Take with furosemide (Patient taking differently: Take 10 mEq by mouth once  daily. Patient is taking but no longer taking Furosemide)    semaglutide, weight loss, (WEGOVY) 0.25 mg/0.5 mL PnIj Inject 0.25 mg into the skin every 7 days.    triamcinolone acetonide 0.1% (KENALOG) 0.1 % cream Apply topically 2 (two) times daily. (Patient taking differently: Apply 1 g topically 2 (two) times daily.)     Family History       Problem Relation (Age of Onset)    Alzheimer's disease Mother          Tobacco Use    Smoking status: Every Day     Current packs/day: 0.25     Average packs/day: 0.5 packs/day for 50.0 years (23.8 ttl pk-yrs)     Types: Cigarettes     Start date: 1975     Passive exposure: Current    Smokeless tobacco: Never    Tobacco comments:     Pt states she has been quit now for a couple weeks, plans to stay quit.   Substance and Sexual Activity    Alcohol use: Yes     Comment: RARELY    Drug use: Never    Sexual activity: Not Currently     Partners: Male     Review of Systems   Constitutional: Negative.    HENT: Negative.     Eyes: Negative.    Respiratory: Negative.     Cardiovascular: Negative.    Gastrointestinal: Negative.    Musculoskeletal:  Positive for myalgias.   Skin:  Positive for wound.   Neurological:  Positive for syncope and light-headedness.   Psychiatric/Behavioral: Negative.       Objective:     Vital Signs (Most Recent):  Temp: 97.7 °F (36.5 °C) (01/18/25 1334)  Pulse: 69 (01/18/25 1904)  Resp: 17 (01/18/25 1904)  BP: 113/63 (01/18/25 1904)  SpO2: 96 % (01/18/25 1904) Vital Signs (24h Range):  Temp:  [97.7 °F (36.5 °C)] 97.7 °F (36.5 °C)  Pulse:  [68-76] 69  Resp:  [16-20] 17  SpO2:  [94 %-98 %] 96 %  BP: (108-127)/(58-77) 113/63     Weight: 110.2 kg (243 lb)  Body mass index is 43.05 kg/m².     Physical Exam  Vitals reviewed.   Constitutional:       General: She is not in acute distress.     Appearance: Normal appearance. She is obese. She is not ill-appearing.   HENT:      Head: Normocephalic and atraumatic.   Eyes:      Pupils: Pupils are equal, round, and  reactive to light.   Cardiovascular:      Rate and Rhythm: Normal rate. Rhythm irregularly irregular.   Abdominal:      General: Bowel sounds are normal.      Tenderness: There is no abdominal tenderness.   Musculoskeletal:      Cervical back: Normal range of motion and neck supple.   Skin:     General: Skin is warm.      Capillary Refill: Capillary refill takes less than 2 seconds.      Comments: Right arm skin tear to upper and lower right arm   Neurological:      Mental Status: She is alert.      GCS: GCS eye subscore is 4. GCS verbal subscore is 5. GCS motor subscore is 6.      Cranial Nerves: Dysarthria: speech slurred.      Sensory: Sensation is intact.      Motor: Weakness present.      Comments: Generalized weakness   Psychiatric:         Mood and Affect: Mood normal.              CRANIAL NERVES     CN III, IV, VI   Pupils are equal, round, and reactive to light.       Significant Labs: All pertinent labs within the past 24 hours have been reviewed.  Recent Lab Results         01/18/25  1838   01/18/25  1400        Albumin   4.0       ALP   174       ALT   13       Anion Gap   9       Appearance, UA Clear         AST   17       Baso #   0.04       Basophil %   0.5       Bilirubin (UA) Negative         BILIRUBIN TOTAL   0.6  Comment: For infants and newborns, interpretation of results should be based  on gestational age, weight and in agreement with clinical  observations.    Premature Infant recommended reference ranges:  Up to 24 hours.............<8.0 mg/dL  Up to 48 hours............<12.0 mg/dL  3-5 days..................<15.0 mg/dL  6-29 days.................<15.0 mg/dL         BUN   27       Calcium   8.7       Chloride   103       CO2   27       Color, UA Yellow         Creatinine   2.2       Differential Method   Automated       eGFR   23.4       Eos #   0.2       Eos %   2.5       Glucose   93       Glucose, UA Negative         Gran # (ANC)   7.0       Gran %   84.3       Hematocrit   28.1        Hemoglobin   8.1       Immature Grans (Abs)   0.10  Comment: Mild elevation in immature granulocytes is non specific and   can be seen in a variety of conditions including stress response,   acute inflammation, trauma and pregnancy. Correlation with other   laboratory and clinical findings is essential.         Immature Granulocytes   1.2       Ketones, UA Negative         Leukocyte Esterase, UA Negative         Lymph #   0.5       Lymph %   6.5       MCH   26.5       MCHC   28.8       MCV   92       Mono #   0.4       Mono %   5.0       MPV   8.8       NITRITE UA Negative         nRBC   0       Blood, UA Negative         pH, UA 7.0         Platelet Count   300       Potassium   4.3       PROTEIN TOTAL   6.7       Protein, UA Negative  Comment: Recommend a 24 hour urine protein or a urine   protein/creatinine ratio if globulin induced proteinuria is  clinically suspected.           RBC   3.06       RDW   19.1       Sodium   139       Spec Grav UA 1.010         Specimen UA Urine, Clean Catch         UROBILINOGEN UA Negative         WBC   8.27               Significant Imaging: I have reviewed all pertinent imaging results/findings within the past 24 hours.  Imaging Results              X-Ray Humerus 2 View Right (Final result)  Result time 01/18/25 17:09:36      Final result by Iraida Millan MD (01/18/25 17:09:36)                   Impression:      Negative right shoulder      Electronically signed by: Iraida Millan  Date:    01/18/2025  Time:    17:09               Narrative:    EXAMINATION:  XR HUMERUS 2 VIEW RIGHT    CLINICAL HISTORY:  Injury, unspecified, initial encounter    FINDINGS:  Two views right shoulder show no fracture, dislocation, or destructive osseous lesion. Soft tissues are unremarkable.                                       CT Head Without Contrast (Final result)  Result time 01/18/25 14:52:25      Final result by Iraida Millan MD (01/18/25 14:52:25)                   Impression:       No acute intracranial abnormalities. Chronic findings as discussed above.      Electronically signed by: Iraida Millan  Date:    01/18/2025  Time:    14:52               Narrative:    EXAMINATION:  CT HEAD WITHOUT CONTRAST    CLINICAL HISTORY:  Head trauma, minor (Age >= 65y);.    TECHNIQUE:  Noninfusion images were obtained from the skull base to the vertex.    All CT scans at this facility utilize dose modulation, iterative reconstruction, and/or weight based dosing when appropriate to reduce radiation dose to as low as reasonably achievable    CMS MANDATED QUALITY DATA - CT RADIATION - 436    COMPARISON:  12/07/2024    FINDINGS:  There is no intracranial mass, hemorrhage, or midline shift. Ventricles and sulci are mildly prominent. There are no pathologic extra-axial fluid collections.    There is no evidence of cortical ischemic change. Changes of mild chronic microvascular white matter disease are noted. Cerebellum and brainstem are normal. The calvarium is intact.                                       X-Ray Pelvis Routine AP (Final result)  Result time 01/18/25 15:09:20      Final result by Iraida Millan MD (01/18/25 15:09:20)                   Impression:      Negative pelvis      Electronically signed by: Iraida Millan  Date:    01/18/2025  Time:    15:09               Narrative:    CLINICAL HISTORY:  (MRN [id number])70 y/o  (1955) F    trauma;    TECHNIQUE:  (A# [id number], Exam time 1/18/2025 14:31)    [id number] XR PELVIS ROUTINE AP  view(s) obtained.    COMPARISON:  None available.    FINDINGS:  Two views of the pelvis    There are no fractures, dislocations or acute osseous abnormalities.  The soft tissues are normal.  There is peripheral vascular calcification.                                       X-Ray Knee 3 View Bilateral (Final result)  Result time 01/18/25 15:08:33      Final result by Iraida Millan MD (01/18/25 15:08:33)                   Impression:      No acute osseous  abnormality    Total right knee arthroplasty without complication    Diffuse soft tissue edema      Electronically signed by: Iraida Millan  Date:    01/18/2025  Time:    15:08               Narrative:    EXAMINATION:  XR KNEE 3 VIEW BILATERAL    CLINICAL HISTORY:  Unspecified fall, initial encounter    FINDINGS:  Two views of the bilateral knees demonstrates a total right knee arthroplasty in satisfactory alignment.  There are no fractures or acute osseous abnormalities.    Two views of the left knee demonstrates mild tricompartmental joint space narrowing.  There are no fractures or acute osseous abnormalities.    There is no suprapatellar joint effusion.  There is diffuse soft tissue edema.                                       X-Ray Chest 1 View (Final result)  Result time 01/18/25 14:53:04      Final result by Iraida Millan MD (01/18/25 14:53:04)                   Impression:      Cardiomegaly with no acute pulmonary process      Electronically signed by: Iraida Millan  Date:    01/18/2025  Time:    14:53               Narrative:    EXAMINATION:  XR CHEST 1 VIEW    CLINICAL HISTORY:  Unspecified fall, initial encounter    FINDINGS:  Portable chest x-ray at 14:10 hours is compared to prior study 12/07/2024    The heart is enlarged.  The lungs are clear.  There are no acute osseous abnormalities.                                      Assessment/Plan:     * JEFF (acute kidney injury)  JEFF is likely due to pre-renal azotemia due to dehydration. Baseline creatinine is  1.9 . Most recent creatinine and eGFR are listed below.  Recent Labs     01/18/25  1400   CREATININE 2.2*   EGFRNORACEVR 23.4*      Plan  - JEFF is worsening. Will continue current treatment  - Avoid nephrotoxins and renally dose meds for GFR listed above  - Monitor urine output, serial BMP, and adjust therapy as needed      Syncope    PT OT  Fall syncope precautions  CT head nonacute  UA negative for infection   right humerus x-ray nonacute,    right knee with diffuse soft tissue edema,   chest x-ray nonacute with some cardiomegaly    Echo     Left Ventricle: The left ventricle is normal in size. Moderately increased wall thickness. Unable to assess wall motion. There is normal systolic function with a visually estimated ejection fraction of 55 - 60%. Unable to assess diastolic function due to atrial fibrillation.    Right Ventricle: Right ventricle was not well visualized due to poor acoustic window.    Left Atrium: Left atrium is severely dilated.    Right Atrium: Right atrium is severely dilated.    Aortic Valve: There is moderate to severe stenosis. Aortic valve area by VTI is 1.0 cm². Aortic valve peak velocity is 3.4 m/s. Mean gradient is 27.0 mmHg. The dimensionless index is 0.32. There is mild aortic regurgitation.    IVC/SVC: Elevated venous pressure at 15 mmHg.       Persistent atrial fibrillation  Patient has persistent (7 days or more) atrial fibrillation. Patient is currently in atrial fibrillation. [id number] Score: 3. The patients heart rate in the last 24 hours is as follows:  Pulse  Min: 68  Max: 76     Antiarrhythmics  amiodarone tablet 200 mg, Daily, Oral  metoprolol succinate (TOPROL-XL) 24 hr tablet 50 mg, 2 times daily, Oral    Anticoagulants  apixaban tablet 5 mg, 2 times daily, Oral    Plan  - Replete lytes with a goal of K>4, Mg >2  - Patient is anticoagulated, see medications listed above.  - Patient's afib is currently controlled  -         Morbid obesity  Body mass index is 43.05 kg/m². Morbid obesity complicates all aspects of disease management from diagnostic modalities to treatment. Weight loss encouraged and health benefits explained to patient.           VTE Risk Mitigation (From admission, onward)           Ordered     apixaban tablet 5 mg  2 times daily         01/18/25 1949                         On 01/18/2025, patient should be placed in hospital observation services under my care in collaboration with De.  Sharath  .           Poornima Su NP  Department of Hospital Medicine  [facility] - Emergency Dept

## 2025-01-19 NOTE — PROGRESS NOTES
[facility] Medicine  Progress Note    Patient Name: Iris Mueller  MRN: [id number]  Patient Class: OP- Observation   Admission Date: 1/18/2025  Length of Stay: 0 days  Attending Physician: Garcia Soriano MD  Primary Care Provider: Elkin You MD        Subjective     Principal Problem:JEFF (acute kidney injury)        HPI:  69-year-old female with a past medical history of COPD asthma, HTN, obesity, CHF, Right knee wound infection, arthritis who presented to the emergency room for  syncope and fall.  She reports just prior to fall and she felt dizzy.  She is unaware of the events of the fall.  Patient has had frequent falls recently. She fell getting out of the shower. She is not entirely sure what happened. She is not sure why she fell. She has pain all over. Mostly complains of pain to her bilateral knees and to her chest. She says that she does not know why she fell but she was in the shower when it happened. She does not think that she slipped a she does take Norco 3 times a day and gabapentin.     In the ER, patient atrial fibrillation,t mild anemia, with H&H 8.7 and 28.1, JEFF with BUN 27 creatinine 2.2, baseline creatinine is 1.81., L dysphagia is 174, UA negative for infection CT head nonacute, right humerus x-ray nonacute, pelvic x-ray no acute fractures, right knee with diffuse soft tissue edema, chest x-ray nonacute with some cardiomegaly    Admit to hospital medicine for syncope workup consult PT/OT , JEFF    I spoke to daughter who states patient recently was seen by cardiologist who stopped 1 of her blood pressure medicines but daughter is concerned patient did not stopped taking the blood pressure medication and her blood pressure dropped    Overview/Hospital Course:  No notes on file    Interval History:  Lying in bed.  Awake and alert.  Disoriented, speech garbled.  Appears encephalopathic.  Requiring supplemental O2. Family at bedside endorsed that  patient is usually ambulatory and lucid.  Renal function improved.  Workup unremarkable, except carotid ultrasound shows bilateral 50-69% carotid stenosis.  On review of records, patient was admitted for AMS and had a CVA workup and workup was negative for stroke.  She has a history of moderate to severe AS and was evaluated by cardiologist, Dr. Ghosh on 01/16/2025.  Family endorses that she has been taking sips of water as well as swallowed her meds this morning without issues. PT/OT pending.  Workup continues to be in progress    Review of Systems   Unable to perform ROS: Mental status change     Objective:     Vital Signs (Most Recent):  Temp: 98.7 °F (37.1 °C) (01/19/25 1059)  Pulse: 76 (01/19/25 1059)  Resp: 18 (01/19/25 1059)  BP: 106/73 (01/19/25 1059)  SpO2: (!) 91 % (01/19/25 1059) Vital Signs (24h Range):  Temp:  [97.5 °F (36.4 °C)-98.7 °F (37.1 °C)] 98.7 °F (37.1 °C)  Pulse:  [68-98] 76  Resp:  [16-20] 18  SpO2:  [89 %-98 %] 91 %  BP: (106-128)/(58-89) 106/73     Weight: 110.2 kg (243 lb)  Body mass index is 43.05 kg/m².    Intake/Output Summary (Last 24 hours) at 1/19/2025 1127  Last data filed at 1/19/2025 0011  Gross per 24 hour   Intake 0 ml   Output --   Net 0 ml         Physical Exam  Constitutional:       General: She is not in acute distress.     Appearance: She is obese.   Eyes:      Extraocular Movements: Extraocular movements intact.      Pupils: Pupils are equal, round, and reactive to light.   Cardiovascular:      Rate and Rhythm: Normal rate. Rhythm irregular.      Pulses: Normal pulses.      Heart sounds: Murmur heard.      Comments: AFib on telemetry  Pulmonary:      Effort: Pulmonary effort is normal. No respiratory distress.      Breath sounds: Rales (Expiratory rales bilaterally, throughout) present.      Comments: O2 via NC donned.  Abdominal:      General: Bowel sounds are normal. There is no distension.      Palpations: Abdomen is soft.      Tenderness: There is no abdominal  "tenderness.   Musculoskeletal:         General: Normal range of motion.      Cervical back: Normal range of motion and neck supple.      Right lower leg: Pitting Edema (1+-2+) present.      Left lower leg: Pitting Edema (1+ to 2+) present.   Skin:     General: Skin is warm and dry.   Neurological:      Mental Status: She is alert. She is confused.      Cranial Nerves: Dysarthria present.      Motor: Weakness (Generalized) present.      Comments: Speech incomprehensible   Psychiatric:         Behavior: Behavior is cooperative.             Significant Labs: All pertinent labs within the past 24 hours have been reviewed.  CBC:   Recent Labs   Lab 01/18/25  1400 01/19/25  0541   WBC 8.27 8.89   HGB 8.1* 7.7*   HCT 28.1* 26.8*    290     CMP:   Recent Labs   Lab 01/18/25  1400 01/19/25  0541    135*   K 4.3 4.8    102   CO2 27 25   GLU 93 98   BUN 27* 23   CREATININE 2.2* 1.9*   CALCIUM 8.7 8.8   PROT 6.7 6.4   ALBUMIN 4.0 3.7   BILITOT 0.6 0.9   ALKPHOS 174* 153*   AST 17 19   ALT 13 10   ANIONGAP 9 8     Cardiac Markers: No results for input(s): "CKMB", "MYOGLOBIN", "BNP", "TROPISTAT" in the last 48 hours.  Troponin:   Recent Labs   Lab 01/19/25  0008   TROPONINIHS 9.3     Urine Studies:   Recent Labs   Lab 01/18/25  1838   COLORU Yellow   APPEARANCEUA Clear   PHUR 7.0   SPECGRAV 1.010   PROTEINUA Negative   GLUCUA Negative   KETONESU Negative   BILIRUBINUA Negative   OCCULTUA Negative   NITRITE Negative   UROBILINOGEN Negative   LEUKOCYTESUR Negative     US Carotid Bilateral  Narrative: EXAMINATION:  US CAROTID BILATERAL    CLINICAL HISTORY:  Syncope;    TECHNIQUE:  Grayscale and color Doppler ultrasound examination of the carotid and vertebral artery systems bilaterally.  Stenosis estimates are per the NASCET measurement criteria.    COMPARISON:  None.    FINDINGS:  Right:    Internal Carotid Artery (ICA) peak systolic velocity 146.1 cm/sec    ICA/CCA peak systolic velocity ratio: 2.3    Plaque " formation: Heterogeneous    Vertebral artery: Antegrade flow and normal waveform.    Left:    Internal Carotid Artery (ICA)  peak systolic velocity 149 cm/sec    ICA/CCA peak systolic velocity ratio: 3.6    Plaque formation: Heterogeneous    Vertebral artery: Antegrade flow and normal waveform.  Impression: 50-69% stenosis of the right ICA.    50-69% stenosis of the left ICA.    Electronically signed by: Marce Narvaez  Date:    01/19/2025  Time:    02:52        Significant Imaging: I have reviewed all pertinent imaging results/findings within the past 24 hours.    Assessment and Plan     * Syncope  Possibly medication induced (on hydrocodone and gabapentin) vs neurologic etiology vs cardiogenic/aortic stenosis contributing  =================================  CT of head negative  UA negative for infection   Last TTE 12/08/2024: EF 55-60%, indeterminate diastolic function, moderate to severe AS  Orthostatics unable to be obtained due to AMS  PT/OT pending   Holding hydrcodone and gabapentin  Maintain fall/syncope precautions    Acute encephalopathy  CTA of head negative, cardiac enzymes negative, CUS bilateral carotid stenosis of 50-69%  Consult SLP for evaluation   Obtain encephalopathy labs, MRI MRA of head and neck to evaluate for possible stroke  PT/OT consult pending  Hold chronic pain medications  Consult SLP for evaluation of cognition and swallowing  Patient with recent concern for possible stroke, obtain MRI/MRA of head and neck--no contrast due to renal function    Acute kidney injury superimposed on stage 4 chronic kidney disease  JEFF is likely due to pre-renal azotemia due to dehydration. Baseline creatinine is  1.7- 1.9 . Most recent creatinine and eGFR are listed below.  Recent Labs     01/18/25  1400 01/19/25  0541   CREATININE 2.2* 1.9*   EGFRNORACEVR 23.4* 27.9*        Plan  - very mild/ JEFF is  essentially resolved and creatinine close to baseline  - Avoid nephrotoxins and renally dose meds for  GFR listed above  - Monitor urine output, serial BMP, and adjust therapy as needed    Moderate aortic stenosis  Echocardiogram with evidence of aortic stenosis that is severe . The patient's most recent echocardiogram result is listed below. We will manage the valvular abnormality by monitor for volume overload    Echo Saline Bubble? No    Result Date: 12/8/2024    Left Ventricle: The left ventricle is normal in size. Moderately   increased wall thickness. Unable to assess wall motion. There is normal   systolic function with a visually estimated ejection fraction of 55 - 60%.   Unable to assess diastolic function due to atrial fibrillation.    Right Ventricle: Right ventricle was not well visualized due to poor   acoustic window.    Left Atrium: Left atrium is severely dilated.    Right Atrium: Right atrium is severely dilated.    Aortic Valve: There is moderate to severe stenosis. Aortic valve area   by VTI is 1.0 cm². Aortic valve peak velocity is 3.4 m/s. Mean gradient is   27.0 mmHg. The dimensionless index is 0.32. There is mild aortic   regurgitation.    IVC/SVC: Elevated venous pressure at 15 mmHg.        ===========================================  Following with Dr. Ghosh for valve workup  Monitor for volume overload    Chronic pain syndrome  Hold chronic hydrocodone and gabapentin due to encephalopathy    Chronic combined systolic and diastolic heart failure  Patient is identified as having Combined Systolic and Diastolic heart failure that is Chronic. CHF is currently controlled. Latest ECHO performed and demonstrates- Results for orders placed during the hospital encounter of 12/07/24    Echo Saline Bubble? No    Interpretation Summary    Left Ventricle: The left ventricle is normal in size. Moderately increased wall thickness. Unable to assess wall motion. There is normal systolic function with a visually estimated ejection fraction of 55 - 60%. Unable to assess diastolic function due to atrial  fibrillation.    Right Ventricle: Right ventricle was not well visualized due to poor acoustic window.    Left Atrium: Left atrium is severely dilated.    Right Atrium: Right atrium is severely dilated.    Aortic Valve: There is moderate to severe stenosis. Aortic valve area by VTI is 1.0 cm². Aortic valve peak velocity is 3.4 m/s. Mean gradient is 27.0 mmHg. The dimensionless index is 0.32. There is mild aortic regurgitation.    IVC/SVC: Elevated venous pressure at 15 mmHg.  . Continue Beta Blocker and monitor clinical status closely. Monitor on telemetry. Patient is off CHF pathway.  Monitor strict Is&Os and daily weights.  Not on fluid restriction. Cardiology is not consulted. Continue to stress to patient importance of self efficacy and  on diet for CHF. Last BNP reviewed- and noted below   Component Ref Range & Units 13 d ago  (1/6/25)   BNP 0 - 99 pg/mL 1,065   .  ===================================  Troponin normal   Appears euvolemic currently   Holding chronic diuretics due to JEFF/ not on chronic ACEI/ARB  Monitor for volume overload    Persistent atrial fibrillation  Patient has persistent (7 days or more) atrial fibrillation. Patient is currently in atrial fibrillation. [id number] Score: 3. The patients heart rate in the last 24 hours is as follows:  Pulse  Min: 68  Max: 98     Antiarrhythmics  amiodarone tablet 200 mg, Daily, Oral  metoprolol succinate (TOPROL-XL) 24 hr tablet 50 mg, 2 times daily, Oral    Anticoagulants  apixaban tablet 5 mg, 2 times daily, Oral    Plan  - Replete lytes with a goal of K>4, Mg >2  - Patient is anticoagulated, see medications listed above.  - Patient's afib is currently controlled    Chronic obstructive pulmonary disease  Patient's COPD is controlled currently.  Patient is currently off COPD Pathway. Continue scheduled inhalers  nebulizers and Supplemental oxygen and monitor respiratory status closely.   Wean supplemental O2 as tolerated    Morbid obesity  Body  mass index is 43.05 kg/m². Morbid obesity complicates all aspects of disease management from diagnostic modalities to treatment. Weight loss encouraged and health benefits explained to patient.       VTE Risk Mitigation (From admission, onward)           Ordered     IP VTE HIGH RISK PATIENT  Once         01/19/25 1559     Reason for No Pharmacological VTE Prophylaxis  Once        Question:  Reasons:  Answer:  Patient is Ambulatory    01/19/25 1559     apixaban tablet 5 mg  2 times daily         01/18/25 1949                    Discharge Planning   VALERIANO: 1/20/2025     Code Status: Full Code   Medical Readiness for Discharge Date:                    Please place Justification for DME        Senia Peña NP  Department of Hospital Medicine   [facility]

## 2025-01-19 NOTE — HPI
69-year-old female with a past medical history of anemia, arthritis, COPD, asthma, hypertension who presents to the emergency room withCOPD, hypertension, obesity, CHF, presents emergency department with a fall. Patient has had frequent falls recently. She fell getting out of the shower. She is not entirely sure what happened. She is not sure why she fell. She has pain all over. Mostly complains of pain to her bilateral knees and to her chest. She says that she does not know why she fell but she was in the shower when it happened. She does not think that she slipped a she does take Norco 3 times a day and gabapentin.

## 2025-01-19 NOTE — HPI
69-year-old female with a past medical history of COPD asthma, HTN, obesity, CHF, Right knee wound infection, arthritis who presented to the emergency room for  syncope and fall.  She reports just prior to fall and she felt dizzy.  She is unaware of the events of the fall.  Patient has had frequent falls recently. She fell getting out of the shower. She is not entirely sure what happened. She is not sure why she fell. She has pain all over. Mostly complains of pain to her bilateral knees and to her chest. She says that she does not know why she fell but she was in the shower when it happened. She does not think that she slipped a she does take Norco 3 times a day and gabapentin.     In the ER, patient atrial fibrillation,t mild anemia, with H&H 8.7 and 28.1, JEFF with BUN 27 creatinine 2.2, baseline creatinine is 1.81., L dysphagia is 174, UA negative for infection CT head nonacute, right humerus x-ray nonacute, pelvic x-ray no acute fractures, right knee with diffuse soft tissue edema, chest x-ray nonacute with some cardiomegaly    Admit to hospital medicine for syncope workup consult PT/OT , JEFF    I spoke to daughter who states patient recently was seen by cardiologist who stopped 1 of her blood pressure medicines but daughter is concerned patient did not stopped taking the blood pressure medication and her blood pressure dropped

## 2025-01-19 NOTE — ASSESSMENT & PLAN NOTE
CTA of head negative, cardiac enzymes negative, CUS bilateral carotid stenosis of 50-69%  Consult SLP for evaluation   Obtain encephalopathy labs, MRI MRA of head and neck to evaluate for possible stroke  PT/OT consult pending  Hold chronic pain medications  Consult SLP for evaluation of cognition and swallowing  Patient with recent concern for possible stroke, obtain MRI/MRA of head and neck--no contrast due to renal function

## 2025-01-19 NOTE — ASSESSMENT & PLAN NOTE
PT OT  Fall syncope precautions  CT head nonacute  UA negative for infection   right humerus x-ray nonacute,   right knee with diffuse soft tissue edema,   chest x-ray nonacute with some cardiomegaly    Echo     Left Ventricle: The left ventricle is normal in size. Moderately increased wall thickness. Unable to assess wall motion. There is normal systolic function with a visually estimated ejection fraction of 55 - 60%. Unable to assess diastolic function due to atrial fibrillation.    Right Ventricle: Right ventricle was not well visualized due to poor acoustic window.    Left Atrium: Left atrium is severely dilated.    Right Atrium: Right atrium is severely dilated.    Aortic Valve: There is moderate to severe stenosis. Aortic valve area by VTI is 1.0 cm². Aortic valve peak velocity is 3.4 m/s. Mean gradient is 27.0 mmHg. The dimensionless index is 0.32. There is mild aortic regurgitation.    IVC/SVC: Elevated venous pressure at 15 mmHg.

## 2025-01-19 NOTE — PT/OT/SLP EVAL
Physical Therapy Evaluation    Patient Name:  Iris Mueller   MRN:  [id number]    Recommendations:     Discharge Recommendations: Moderate Intensity Therapy   Discharge Equipment Recommendations: to be determined by next level of care   Barriers to discharge: Inaccessible home, Decreased caregiver support, and medical status    Assessment:     Iris Mueller is a 69 y.o. female admitted with a medical diagnosis of JEFF (acute kidney injury).  She presents with the following impairments/functional limitations: weakness, impaired endurance, impaired self care skills, impaired functional mobility, gait instability, impaired balance, impaired cognition, decreased lower extremity function, decreased safety awareness, pain, decreased ROM, impaired cardiopulmonary response to activity. Pt was agreeable to session at this time. Pt found supine in bed with , sister, and brother-in-law present. Supine<>sit bed mobility Max A x2. Poor sitting balance that required Mod A to sit upright. Decreased ability to listen with verbal cues.    Rehab Prognosis: Fair; patient would benefit from acute skilled PT services to address these deficits and reach maximum level of function.    Recent Surgery: * No surgery found *      Plan:     During this hospitalization, patient to be seen 6 x/week to address the identified rehab impairments via gait training, therapeutic activities, therapeutic exercises, neuromuscular re-education and progress toward the following goals:    Plan of Care Expires:  02/19/25    Subjective     Chief Complaint: pain in low back  Patient/Family Comments/goals: return home  Pain/Comfort:  Pain Rating 1: 9/10  Location - Side 1: Bilateral  Location - Orientation 1: generalized  Location 1: back  Pain Addressed 1: Distraction, Reposition, Pre-medicate for activity    Patients cultural, spiritual, Zoroastrian conflicts given the current situation:      Living Environment:  Pt lives with   (who is visually impaired) in single story home with 2 steps to get into house. Pt has a step over tub.    Prior to admission, patients level of function was Modified [place].  Equipment used at home: wheelchair, cane, straight, shower chair, walker, rolling.  DME owned (not currently used): none.  Upon discharge, patient will have assistance from .    Objective:     Communicated with RN, [place], prior to session.  Patient found supine with bed alarm, oxygen, PureWick, peripheral IV, telemetry  upon PT entry to room.    General Precautions: Standard, fall  Orthopedic Precautions:    Braces:    Respiratory Status: Nasal cannula, flow 2 L/min    Exams:  RLE ROM: Deficits: generalized decreased ROM  RLE Strength: Deficits: 4-/5 generalized  LLE ROM: Deficits: generalized decreased ROM  LLE Strength: Deficits: 4-/5 generalized    Functional Mobility:  Bed Mobility:     Scooting: maximal assistance and of 2 persons  Supine to Sit: maximal assistance and of 2 persons  Sit to Supine: maximal assistance and of 2 persons  Balance: Poor sitting balance with Mod A      AM-PAC 6 CLICK MOBILITY  Total Score:8       Treatment & Education:  Spoke with pt about using call button.    Patient left supine with all lines intact, call button in reach, bed alarm on, RN notified, and sister present.    GOALS:   Multidisciplinary Problems       Physical Therapy Goals          Problem: Physical Therapy    Goal Priority Disciplines Outcome Interventions   Physical Therapy Goal     PT, PT/OT     Description: Goals to be met by: 2025     Patient will increase functional independence with mobility by performin. Supine to sit with Contact Guard Assistance  2. Sit to supine with Contact Guard Assistance  3. Sit to stand transfer with Minimal Assistance  4. Gait  x 100 feet with Minimal Assistance using Rolling Walker.                          DME Justifications:  No DME recommended requiring DME  justifications    History:     Past Medical History:   Diagnosis Date    Anemia, unspecified     Arthritis     Asthma     COPD (chronic obstructive pulmonary disease)     Encounter for blood transfusion     Hypertension     Obese body habitus     Wound infection 2019    Right knee       Past Surgical History:   Procedure Laterality Date    ANGIOGRAM, CORONARY, WITH LEFT HEART CATHETERIZATION N/A 2022    Procedure: Angiogram, Coronary, with Left Heart Cath;  Surgeon: Jorge Tran MD;  Location: [facility] CATH/EP LAB;  Service: Cardiology;  Laterality: N/A;     SECTION      ECHOCARDIOGRAM,TRANSESOPHAGEAL N/A 2023    Procedure: Transesophageal echo (MARIANO) intra-procedure log documentation;  Surgeon: Provider, Dos Diagnostic;  Location: [facility] EP LAB;  Service: Cardiology;  Laterality: N/A;    INCISION AND DRAINAGE OF HEMATOMA Left 2024    Procedure: INCISION AND DRAINAGE, HEMATOMA;  Surgeon: Bryson Huerta MD;  Location: [facility] OR;  Service: Orthopedics;  Laterality: Left;    JOINT REPLACEMENT      Knee    KNEE ARTHROPLASTY Right 2019    Procedure: ARTHROPLASTY, KNEE;  Surgeon: Delio Chung MD;  Location: [facility] OR;  Service: Orthopedics;  Laterality: Right;  anesthesia:  general and block    REPLACEMENT OF WOUND VACUUM-ASSISTED CLOSURE DEVICE Right 2019    Procedure: REPLACEMENT, WOUND VAC;  Surgeon: Delio Chung MD;  Location: [facility] OR;  Service: Orthopedics;  Laterality: Right;    TONSILLECTOMY      TREATMENT OF CARDIAC ARRHYTHMIA N/A 2023    Procedure: Cardioversion or Defibrillation;  Surgeon: PJ Betancourt MD;  Location: [facility] EP LAB;  Service: Cardiology;  Laterality: N/A;  AF, MARIANO, DCCV, MAC, EH, 3 Prep    VENTRICULOGRAM, LEFT  2022    Procedure: Ventriculogram, Left;  Surgeon: Jorge Tran MD;  Location: [facility] CATH/EP LAB;  Service: Cardiology;;       Time Tracking:     PT Received On: 25  PT Start Time: 1121     PT Stop Time: 1142  PT Total  Time (min): 21 min     Billable Minutes: Evaluation 10 and Therapeutic Activity 11      01/19/2025

## 2025-01-19 NOTE — ASSESSMENT & PLAN NOTE
Patient has persistent (7 days or more) atrial fibrillation. Patient is currently in atrial fibrillation. [id number] Score: 3. The patients heart rate in the last 24 hours is as follows:  Pulse  Min: 68  Max: 76     Antiarrhythmics  amiodarone tablet 200 mg, Daily, Oral  metoprolol succinate (TOPROL-XL) 24 hr tablet 50 mg, 2 times daily, Oral    Anticoagulants  apixaban tablet 5 mg, 2 times daily, Oral    Plan  - Replete lytes with a goal of K>4, Mg >2  - Patient is anticoagulated, see medications listed above.  - Patient's afib is currently controlled  -

## 2025-01-19 NOTE — PT/OT/SLP EVAL
Speech Language Pathology Evaluation  Bedside Swallow    Patient Name:  Iris Mueller   MRN:  [id number]  Admitting Diagnosis: JEFF (acute kidney injury)    Recommendations:                 General Recommendations:  Follow-up not indicated  Diet recommendations:  Regular, Thin   Aspiration Precautions:  upright with all intake, assistance with meals, only feed when alert    General Precautions: Standard, aspiration, fall  Communication strategies:   reorientation as needed    Assessment:     Iris Mueller is a 69 y.o. female with an admitting diagnosis of JEFF (acute kidney injury). Clinical swallowing evaluation completed. All po trials consumed with functional oral phase and no overt s/s airway compromise. REC Regular (IDDSI 7) textures with thin liquids. Diet recs communicated with patient's nurse, Keyona, @ 1240. No f/u indicated ATT. Please re consult PRN or if confusion persists.     History:   PER HPI: 69-year-old female with a past medical history of COPD asthma, HTN, obesity, CHF, Right knee wound infection, arthritis who presented to the emergency room for  syncope and fall.  She reports just prior to fall and she felt dizzy.  She is unaware of the events of the fall.  Patient has had frequent falls recently. She fell getting out of the shower. She is not entirely sure what happened. She is not sure why she fell. She has pain all over. Mostly complains of pain to her bilateral knees and to her chest. She says that she does not know why she fell but she was in the shower when it happened. She does not think that she slipped a she does take Norco 3 times a day and gabapentin.      In the ER, patient atrial fibrillation,t mild anemia, with H&H 8.7 and 28.1, JEFF with BUN 27 creatinine 2.2, baseline creatinine is 1.81., L dysphagia is 174, UA negative for infection CT head nonacute, right humerus x-ray nonacute, pelvic x-ray no acute fractures, right knee with diffuse soft tissue edema, chest  x-ray nonacute with some cardiomegaly     Admit to hospital medicine for syncope workup consult PT/OT , JEFF     I spoke to daughter who states patient recently was seen by cardiologist who stopped 1 of her blood pressure medicines but daughter is concerned patient did not stopped taking the blood pressure medication and her blood pressure dropped    Past Medical History:   Diagnosis Date    Anemia, unspecified     Arthritis     Asthma     COPD (chronic obstructive pulmonary disease)     Encounter for blood transfusion     Hypertension     Obese body habitus     Wound infection 2019    Right knee       Past Surgical History:   Procedure Laterality Date    ANGIOGRAM, CORONARY, WITH LEFT HEART CATHETERIZATION N/A 2022    Procedure: Angiogram, Coronary, with Left Heart Cath;  Surgeon: Jorge Tran MD;  Location: [facility] CATH/EP LAB;  Service: Cardiology;  Laterality: N/A;     SECTION      ECHOCARDIOGRAM,TRANSESOPHAGEAL N/A 2023    Procedure: Transesophageal echo (MARIANO) intra-procedure log documentation;  Surgeon: Provider, Araceli Diagnostic;  Location: [facility] EP LAB;  Service: Cardiology;  Laterality: N/A;    INCISION AND DRAINAGE OF HEMATOMA Left 2024    Procedure: INCISION AND DRAINAGE, HEMATOMA;  Surgeon: Bryson Huerta MD;  Location: [facility] OR;  Service: Orthopedics;  Laterality: Left;    JOINT REPLACEMENT      Knee    KNEE ARTHROPLASTY Right 2019    Procedure: ARTHROPLASTY, KNEE;  Surgeon: Delio Chung MD;  Location: [facility] OR;  Service: Orthopedics;  Laterality: Right;  anesthesia:  general and block    REPLACEMENT OF WOUND VACUUM-ASSISTED CLOSURE DEVICE Right 2019    Procedure: REPLACEMENT, WOUND VAC;  Surgeon: Delio Chung MD;  Location: [facility] OR;  Service: Orthopedics;  Laterality: Right;    TONSILLECTOMY      TREATMENT OF CARDIAC ARRHYTHMIA N/A 2023    Procedure: Cardioversion or Defibrillation;  Surgeon: PJ Betancourt MD;  Location: [facility] EP LAB;  Service:  Cardiology;  Laterality: N/A;  AF, MARIANO, DCCV, MAC, EH, 3 Prep    VENTRICULOGRAM, LEFT  12/23/2022    Procedure: Ventriculogram, Left;  Surgeon: Jorge Tran MD;  Location: [facility] CATH/EP LAB;  Service: Cardiology;;       Social History: Patient lives with  (deaf and blind).    Prior Intubation HX:  None this admit    Modified Barium Swallow: None in Epic    Imaging:  Imaging Results              US Carotid Bilateral (Final result)  Result time 01/19/25 02:52:52      Final result by Marce Narvaez MD (01/19/25 02:52:52)                   Impression:      50-69% stenosis of the right ICA.    50-69% stenosis of the left ICA.      Electronically signed by: Marce Narvaez  Date:    01/19/2025  Time:    02:52               Narrative:    EXAMINATION:  US CAROTID BILATERAL    CLINICAL HISTORY:  Syncope;    TECHNIQUE:  Grayscale and color Doppler ultrasound examination of the carotid and vertebral artery systems bilaterally.  Stenosis estimates are per the NASCET measurement criteria.    COMPARISON:  None.    FINDINGS:  Right:    Internal Carotid Artery (ICA) peak systolic velocity 146.1 cm/sec    ICA/CCA peak systolic velocity ratio: 2.3    Plaque formation: Heterogeneous    Vertebral artery: Antegrade flow and normal waveform.    Left:    Internal Carotid Artery (ICA)  peak systolic velocity 149 cm/sec    ICA/CCA peak systolic velocity ratio: 3.6    Plaque formation: Heterogeneous    Vertebral artery: Antegrade flow and normal waveform.                                       X-Ray Humerus 2 View Right (Final result)  Result time 01/18/25 17:09:36      Final result by Iraida Millan MD (01/18/25 17:09:36)                   Impression:      Negative right shoulder      Electronically signed by: Iraida Millan  Date:    01/18/2025  Time:    17:09               Narrative:    EXAMINATION:  XR HUMERUS 2 VIEW RIGHT    CLINICAL HISTORY:  Injury, unspecified, initial encounter    FINDINGS:  Two views right  shoulder show no fracture, dislocation, or destructive osseous lesion. Soft tissues are unremarkable.                                       CT Head Without Contrast (Final result)  Result time 01/18/25 14:52:25      Final result by Iraida Millan MD (01/18/25 14:52:25)                   Impression:      No acute intracranial abnormalities. Chronic findings as discussed above.      Electronically signed by: Iraida Millan  Date:    01/18/2025  Time:    14:52               Narrative:    EXAMINATION:  CT HEAD WITHOUT CONTRAST    CLINICAL HISTORY:  Head trauma, minor (Age >= 65y);.    TECHNIQUE:  Noninfusion images were obtained from the skull base to the vertex.    All CT scans at this facility utilize dose modulation, iterative reconstruction, and/or weight based dosing when appropriate to reduce radiation dose to as low as reasonably achievable    CMS MANDATED QUALITY DATA - CT RADIATION - 436    COMPARISON:  12/07/2024    FINDINGS:  There is no intracranial mass, hemorrhage, or midline shift. Ventricles and sulci are mildly prominent. There are no pathologic extra-axial fluid collections.    There is no evidence of cortical ischemic change. Changes of mild chronic microvascular white matter disease are noted. Cerebellum and brainstem are normal. The calvarium is intact.                                       X-Ray Pelvis Routine AP (Final result)  Result time 01/18/25 15:09:20      Final result by Iraida Millan MD (01/18/25 15:09:20)                   Impression:      Negative pelvis      Electronically signed by: Iraida Millan  Date:    01/18/2025  Time:    15:09               Narrative:    CLINICAL HISTORY:  (MRN [id number])68 y/o  (1955) F    trauma;    TECHNIQUE:  (A# [id number], Exam time 1/18/2025 14:31)    [id number] XR PELVIS ROUTINE AP  view(s) obtained.    COMPARISON:  None available.    FINDINGS:  Two views of the pelvis    There are no fractures, dislocations or acute osseous abnormalities.   "The soft tissues are normal.  There is peripheral vascular calcification.                                       X-Ray Knee 3 View Bilateral (Final result)  Result time 01/18/25 15:08:33      Final result by Iraida Millan MD (01/18/25 15:08:33)                   Impression:      No acute osseous abnormality    Total right knee arthroplasty without complication    Diffuse soft tissue edema      Electronically signed by: Iraida Millan  Date:    01/18/2025  Time:    15:08               Narrative:    EXAMINATION:  XR KNEE 3 VIEW BILATERAL    CLINICAL HISTORY:  Unspecified fall, initial encounter    FINDINGS:  Two views of the bilateral knees demonstrates a total right knee arthroplasty in satisfactory alignment.  There are no fractures or acute osseous abnormalities.    Two views of the left knee demonstrates mild tricompartmental joint space narrowing.  There are no fractures or acute osseous abnormalities.    There is no suprapatellar joint effusion.  There is diffuse soft tissue edema.                                       X-Ray Chest 1 View (Final result)  Result time 01/18/25 14:53:04      Final result by Iraida Millan MD (01/18/25 14:53:04)                   Impression:      Cardiomegaly with no acute pulmonary process      Electronically signed by: Iraida Millan  Date:    01/18/2025  Time:    14:53               Narrative:    EXAMINATION:  XR CHEST 1 VIEW    CLINICAL HISTORY:  Unspecified fall, initial encounter    FINDINGS:  Portable chest x-ray at 14:10 hours is compared to prior study 12/07/2024    The heart is enlarged.  The lungs are clear.  There are no acute osseous abnormalities.                                       Prior diet: regular/thin    Subjective     States she is tired.     Pain/Comfort:  Pain Rating 1:  (did not rate)  Location 1:  (R arm and "all over" with movement)    Respiratory Status: Nasal cannula, flow 2 L/min    Objective:   Pt seen for clinical swallow evaluation. " Upon arrival, sister feeding pt with HOB ~30°. Pt/family educated on importance of upright position with all intake; verbalized understanding.     Pt awake and oriented x3 with some evidence of confusion observed. Harsh vocal quality and mild articulatory imprecision noted. Pt endorses fatigue. Appears generally weak. Denies dysphagia.     Oral Musculature Evaluation  Oral Musculature: WFL  Dentition: upper and lower dentures  Secretion Management: adequate  Mucosal Quality: adequate  Mandibular Strength and Mobility: WFL  Oral Labial Strength and Mobility: WFL  Lingual Strength and Mobility: WFL  Velar Elevation: WFL  Buccal Strength and Mobility: WFL  Volitional Cough: adequate  Volitional Swallow: able to palpate laryngeal rise  Voice Prior to PO Intake: harsh; baseline per pt and family    Bedside Swallow Eval:   Consistencies Assessed:  Thin liquids ---via cup and straw  Puree --applesauce  Soft solids --cooked, sliced carrots  Solids --roast beef      Oral Phase:   WFL  Slow but functional    Pharyngeal Phase:   no overt clinical signs/symptoms of aspiration  no overt clinical signs/symptoms of pharyngeal dysphagia    Compensatory Strategies  None    Treatment: Pt/family educated re: results/recs of evaluation and aspiration precautions. Receptive to information provided.     Plan:     Patient to be seen:      Plan of Care expires:     Plan of Care reviewed with:  sibling (brother in law.)   SLP Follow-Up:  No       Discharge recommendations:  No Therapy Indicated   Barriers to Discharge:  Safety Awareness .    Time Tracking:     SLP Treatment Date:   01/19/25  Speech Start Time:  1209  Speech Stop Time:  1225     Speech Total Time (min):  16 min    Billable Minutes: Treatment Swallowing Dysfunction 8, Eval Swallow and Oral Function 8, and Total Time 16    01/19/2025

## 2025-01-20 LAB
ALBUMIN SERPL BCP-MCNC: 3.3 G/DL (ref 3.5–5.2)
ALLENS TEST: ABNORMAL
ALP SERPL-CCNC: 122 U/L (ref 55–135)
ALT SERPL W/O P-5'-P-CCNC: 9 U/L (ref 10–44)
ANION GAP SERPL CALC-SCNC: 8 MMOL/L (ref 8–16)
AST SERPL-CCNC: 10 U/L (ref 10–40)
BASOPHILS # BLD AUTO: 0.02 K/UL (ref 0–0.2)
BASOPHILS NFR BLD: 0.2 % (ref 0–1.9)
BILIRUB SERPL-MCNC: 0.7 MG/DL (ref 0.1–1)
BNP SERPL-MCNC: 1065 PG/ML (ref 0–99)
BUN SERPL-MCNC: 19 MG/DL (ref 8–23)
CALCIUM SERPL-MCNC: 8.6 MG/DL (ref 8.7–10.5)
CHLORIDE SERPL-SCNC: 102 MMOL/L (ref 95–110)
CO2 SERPL-SCNC: 27 MMOL/L (ref 23–29)
CREAT SERPL-MCNC: 1.8 MG/DL (ref 0.5–1.4)
DELSYS: ABNORMAL
DIFFERENTIAL METHOD BLD: ABNORMAL
EOSINOPHIL # BLD AUTO: 0 K/UL (ref 0–0.5)
EOSINOPHIL NFR BLD: 0.1 % (ref 0–8)
ERYTHROCYTE [DISTWIDTH] IN BLOOD BY AUTOMATED COUNT: 18.6 % (ref 11.5–14.5)
EST. GFR  (NO RACE VARIABLE): 29.8 ML/MIN/1.73 M^2
FLOW: 4.5
GLUCOSE SERPL-MCNC: 103 MG/DL (ref 70–110)
HCO3 UR-SCNC: 29.4 MMOL/L (ref 24–28)
HCT VFR BLD AUTO: 24.5 % (ref 37–48.5)
HGB BLD-MCNC: 7 G/DL (ref 12–16)
IMM GRANULOCYTES # BLD AUTO: 0.06 K/UL (ref 0–0.04)
IMM GRANULOCYTES NFR BLD AUTO: 0.7 % (ref 0–0.5)
LYMPHOCYTES # BLD AUTO: 0.5 K/UL (ref 1–4.8)
LYMPHOCYTES NFR BLD: 5.7 % (ref 18–48)
MAGNESIUM SERPL-MCNC: 1.8 MG/DL (ref 1.6–2.6)
MCH RBC QN AUTO: 26.2 PG (ref 27–31)
MCHC RBC AUTO-ENTMCNC: 28.6 G/DL (ref 32–36)
MCV RBC AUTO: 92 FL (ref 82–98)
MODE: ABNORMAL
MONOCYTES # BLD AUTO: 0.6 K/UL (ref 0.3–1)
MONOCYTES NFR BLD: 6.9 % (ref 4–15)
NEUTROPHILS # BLD AUTO: 7.3 K/UL (ref 1.8–7.7)
NEUTROPHILS NFR BLD: 86.4 % (ref 38–73)
NRBC BLD-RTO: 0 /100 WBC
OHS QRS DURATION: 102 MS
OHS QRS DURATION: 110 MS
OHS QTC CALCULATION: 470 MS
OHS QTC CALCULATION: 482 MS
PCO2 BLDA: 50.9 MMHG (ref 35–45)
PH SMN: 7.37 [PH] (ref 7.35–7.45)
PHOSPHATE SERPL-MCNC: 3.4 MG/DL (ref 2.7–4.5)
PLATELET # BLD AUTO: 241 K/UL (ref 150–450)
PMV BLD AUTO: 8.7 FL (ref 9.2–12.9)
PO2 BLDA: 20 MMHG (ref 40–60)
POC BE: 4 MMOL/L
POC SATURATED O2: 30 % (ref 95–100)
POC TCO2: 31 MMOL/L (ref 24–29)
POCT GLUCOSE: 100 MG/DL (ref 70–110)
POTASSIUM SERPL-SCNC: 3.8 MMOL/L (ref 3.5–5.1)
PROT SERPL-MCNC: 5.6 G/DL (ref 6–8.4)
RBC # BLD AUTO: 2.67 M/UL (ref 4–5.4)
SAMPLE: ABNORMAL
SITE: ABNORMAL
SODIUM SERPL-SCNC: 137 MMOL/L (ref 136–145)
TREPONEMA PALLIDUM IGG+IGM AB [PRESENCE] IN SERUM OR PLASMA BY IMMUNOASSAY: NONREACTIVE
TROPONIN I SERPL HS-MCNC: 10.6 PG/ML (ref 0–14.9)
WBC # BLD AUTO: 8.42 K/UL (ref 3.9–12.7)

## 2025-01-20 PROCEDURE — 84100 ASSAY OF PHOSPHORUS: CPT | Performed by: NURSE PRACTITIONER

## 2025-01-20 PROCEDURE — 63600175 PHARM REV CODE 636 W HCPCS: Performed by: NURSE PRACTITIONER

## 2025-01-20 PROCEDURE — G0378 HOSPITAL OBSERVATION PER HR: HCPCS

## 2025-01-20 PROCEDURE — 25000242 PHARM REV CODE 250 ALT 637 W/ HCPCS: Performed by: HOSPITALIST

## 2025-01-20 PROCEDURE — 99900035 HC TECH TIME PER 15 MIN (STAT)

## 2025-01-20 PROCEDURE — 25000003 PHARM REV CODE 250: Performed by: NURSE PRACTITIONER

## 2025-01-20 PROCEDURE — 88738 HGB QUANT TRANSCUTANEOUS: CPT

## 2025-01-20 PROCEDURE — 84484 ASSAY OF TROPONIN QUANT: CPT | Performed by: NURSE PRACTITIONER

## 2025-01-20 PROCEDURE — 83880 ASSAY OF NATRIURETIC PEPTIDE: CPT | Performed by: NURSE PRACTITIONER

## 2025-01-20 PROCEDURE — 85025 COMPLETE CBC W/AUTO DIFF WBC: CPT | Performed by: NURSE PRACTITIONER

## 2025-01-20 PROCEDURE — 94640 AIRWAY INHALATION TREATMENT: CPT | Mod: XB

## 2025-01-20 PROCEDURE — 99900031 HC PATIENT EDUCATION (STAT)

## 2025-01-20 PROCEDURE — 94761 N-INVAS EAR/PLS OXIMETRY MLT: CPT

## 2025-01-20 PROCEDURE — 80053 COMPREHEN METABOLIC PANEL: CPT | Performed by: NURSE PRACTITIONER

## 2025-01-20 PROCEDURE — 83735 ASSAY OF MAGNESIUM: CPT | Performed by: NURSE PRACTITIONER

## 2025-01-20 PROCEDURE — 36415 COLL VENOUS BLD VENIPUNCTURE: CPT | Performed by: NURSE PRACTITIONER

## 2025-01-20 PROCEDURE — 27000221 HC OXYGEN, UP TO 24 HOURS

## 2025-01-20 RX ORDER — ACETAMINOPHEN 325 MG/1
325 TABLET ORAL EVERY 4 HOURS PRN
Status: DISCONTINUED | OUTPATIENT
Start: 2025-01-20 | End: 2025-01-22

## 2025-01-20 RX ORDER — LORAZEPAM 2 MG/ML
0.5 INJECTION INTRAMUSCULAR
Status: DISCONTINUED | OUTPATIENT
Start: 2025-01-20 | End: 2025-01-28 | Stop reason: HOSPADM

## 2025-01-20 RX ORDER — FUROSEMIDE 10 MG/ML
20 INJECTION INTRAMUSCULAR; INTRAVENOUS ONCE
Status: COMPLETED | OUTPATIENT
Start: 2025-01-20 | End: 2025-01-20

## 2025-01-20 RX ADMIN — APIXABAN 5 MG: 5 TABLET, FILM COATED ORAL at 10:01

## 2025-01-20 RX ADMIN — ACETAMINOPHEN 325 MG: 325 TABLET ORAL at 02:01

## 2025-01-20 RX ADMIN — BUPROPION HYDROCHLORIDE 150 MG: 150 TABLET, FILM COATED, EXTENDED RELEASE ORAL at 10:01

## 2025-01-20 RX ADMIN — METOPROLOL SUCCINATE 50 MG: 50 TABLET, FILM COATED, EXTENDED RELEASE ORAL at 10:01

## 2025-01-20 RX ADMIN — MIRTAZAPINE 15 MG: 15 TABLET, FILM COATED ORAL at 10:01

## 2025-01-20 RX ADMIN — ACETAMINOPHEN 650 MG: 325 TABLET ORAL at 05:01

## 2025-01-20 RX ADMIN — MUPIROCIN 1 G: 20 OINTMENT TOPICAL at 10:01

## 2025-01-20 RX ADMIN — AMIODARONE HYDROCHLORIDE 200 MG: 200 TABLET ORAL at 10:01

## 2025-01-20 RX ADMIN — FLUOXETINE HYDROCHLORIDE 40 MG: 20 CAPSULE ORAL at 10:01

## 2025-01-20 RX ADMIN — IPRATROPIUM BROMIDE 0.5 MG: 0.5 SOLUTION RESPIRATORY (INHALATION) at 01:01

## 2025-01-20 RX ADMIN — IPRATROPIUM BROMIDE 0.5 MG: 0.5 SOLUTION RESPIRATORY (INHALATION) at 07:01

## 2025-01-20 RX ADMIN — FUROSEMIDE 20 MG: 10 INJECTION, SOLUTION INTRAMUSCULAR; INTRAVENOUS at 09:01

## 2025-01-20 RX ADMIN — ARFORMOTEROL TARTRATE 15 MCG: 15 SOLUTION RESPIRATORY (INHALATION) at 07:01

## 2025-01-20 RX ADMIN — LORAZEPAM 0.5 MG: 2 INJECTION INTRAMUSCULAR; INTRAVENOUS at 08:01

## 2025-01-20 RX ADMIN — BUDESONIDE INHALATION 0.5 MG: 0.5 SUSPENSION RESPIRATORY (INHALATION) at 07:01

## 2025-01-20 RX ADMIN — ATORVASTATIN CALCIUM 10 MG: 10 TABLET, FILM COATED ORAL at 10:01

## 2025-01-20 NOTE — PLAN OF CARE
01/20/25 1524   GAONA Message   Medicare Outpatient and Observation Notification regarding financial responsibility Given to patient/caregiver;Explained to patient/caregiver;Signed/date by patient/caregiver   Date GAONA was signed 01/20/25   Time GAONA was signed 1233

## 2025-01-20 NOTE — ASSESSMENT & PLAN NOTE
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 CHF is currently controlled. Latest ECHO performed and demonstrates- Results for orders placed during the hospital encounter of 12/07/24    Echo Saline Bubble? No    Interpretation Summary    Left Ventricle: The left ventricle is normal in size. Moderately increased wall thickness. Unable to assess wall motion. There is normal systolic function with a visually estimated ejection fraction of 55 - 60%. Unable to assess diastolic function due to atrial fibrillation.    Right Ventricle: Right ventricle was not well visualized due to poor acoustic window.    Left Atrium: Left atrium is severely dilated.    Right Atrium: Right atrium is severely dilated.    Aortic Valve: There is moderate to severe stenosis. Aortic valve area by VTI is 1.0 cm². Aortic valve peak velocity is 3.4 m/s. Mean gradient is 27.0 mmHg. The dimensionless index is 0.32. There is mild aortic regurgitation.    IVC/SVC: Elevated venous pressure at 15 mmHg.  . Continue Beta Blocker and monitor clinical status closely. Monitor on telemetry. Patient is off CHF pathway.  Monitor strict Is&Os and daily weights.  Not on fluid restriction. Cardiology is not consulted. Continue to stress to patient importance of self efficacy and  on diet for CHF. Last BNP reviewed- and noted below   Component Ref Range & Units 13 d ago  (1/6/25)   BNP 0 - 99 pg/mL 1,065   .  ===================================  Troponin normal   Have been holding chronic diuretics due to JEFF, which has improved  Patient currently too somnolent to take oral medications  Give IV Lasix x1 now for increased oxygen demands, obtain CXR, check BNP/troponin

## 2025-01-20 NOTE — ASSESSMENT & PLAN NOTE
Patient has persistent (7 days or more) atrial fibrillation. Patient is currently in atrial fibrillation. EQIGK2CWCg Score: 3. The patients heart rate in the last 24 hours is as follows:  Pulse  Min: 70  Max: 103     Antiarrhythmics  amiodarone tablet 200 mg, Daily, Oral  metoprolol succinate (TOPROL-XL) 24 hr tablet 50 mg, 2 times daily, Oral    Anticoagulants  apixaban tablet 5 mg, 2 times daily, Oral    Plan  - Replete lytes with a goal of K>4, Mg >2  - Patient is anticoagulated, see medications listed above.  - Patient's afib is currently controlled

## 2025-01-20 NOTE — PLAN OF CARE
[facility]  Initial Discharge Assessment       Primary Care Provider: Elkin You MD    Admission Diagnosis: JEFF (acute kidney injury) [N17.9]    Admission Date: 1/18/2025  Expected Discharge Date: 1/22/2025    Transition of Care Barriers: None    Assessment completed with patient's daughter via telephone.  Demographics confirmed, PCP confirmed.  Pt lives at home with spouse.  Current with [facility] [employer], DME includes rollator and shower chair.  Denies blood thinners and HD.  Confirmed daughter as transportation at discharge,  Pharmacy confirmed.    Payor: [employer] [facility] / Plan: [employer] CHOICES / Product Type: Medicare Advantage /     Extended Emergency Contact Information  Primary Emergency Contact: Barbara Ribeiro  Work Phone: [phone]  Mobile Phone: [phone]  Relation: Daughter   needed? No  Secondary Emergency Contact: Shonna Escoto  Mobile Phone: [phone]  Relation: Relative  Preferred language: English   needed? No    Discharge Plan A: Home Health  Discharge Plan B: Home Health      [id number] [place] - [address] [facility] [address]  [address]  [facility] [address]  Phone: [phone] Fax: [phone]      Initial Assessment (most recent)       Adult Discharge Assessment - 01/20/25 1224          Discharge Assessment    Assessment Type Discharge Planning Assessment     Confirmed/corrected address, phone number and insurance Yes     Confirmed Demographics Correct on Facesheet     Source of Information family     If unable to respond/provide information was family/caregiver contacted? Yes     Contact Name/Number Barbara Ribeiro (daughter) [phone]     Does patient/caregiver understand observation status Yes     Communicated VALERIANO with patient/caregiver Date not available/Unable to determine     Reason For Admission Syncope     People in Home spouse     Do you expect to return  to your current living situation? Yes     Do you have help at home or someone to help you manage your care at home? No     Prior to hospitilization cognitive status: Alert/Oriented     Walking or Climbing Stairs Difficulty yes     Walking or Climbing Stairs ambulation difficulty, assistance 1 person;stair climbing difficulty, assistance 1 person     Mobility Management rollator     Dressing/Bathing Difficulty yes     Dressing/Bathing bathing difficulty, assistance 1 person     Dressing/Bathing Management shower chair     Equipment Currently Used at Home rollator;shower chair     Readmission within 30 days? No     Patient currently being followed by outpatient case management? No     Do you currently have service(s) that help you manage your care at home? Yes     Name and Contact number of agency [facility]     Is the pt/caregiver preference to resume services with current agency Yes     Do you take prescription medications? Yes     Do you have prescription coverage? Yes     Coverage [email or web address] [facility] - [email or web address] CHOICES     Do you have any problems affording any of your prescribed medications? No     Is the patient taking medications as prescribed? yes     Who is going to help you get home at discharge? daughter     How do you get to doctors appointments? family or friend will provide     Are you on dialysis? No     Do you take coumadin? No     Discharge Plan A Home Health     Discharge Plan B Home Health     DME Needed Upon Discharge  none     Discharge Plan discussed with: Adult children     Transition of Care Barriers None

## 2025-01-20 NOTE — PT/OT/SLP PROGRESS
Physical Therapy      Patient Name:  Iris uMeller   MRN:  [id number]    Patient not seen today secondary to Nurse/ TACO hold (attempted in am, , testing occuring. In pm, nusring states still perfroming tests, decreased O2 sat.). Will follow-up .

## 2025-01-20 NOTE — ASSESSMENT & PLAN NOTE
Possibly medication induced (on hydrocodone and gabapentin) vs neurologic etiology vs cardiogenic/aortic stenosis possibly contributing  =================================  CT of head negative  UA negative for infection   Last TTE 12/08/2024: EF 55-60%, indeterminate diastolic function, moderate to severe AS  Orthostatics unable to be obtained due to AMS  PT/OT recommends moderate intensity   Holding hydrcodone and gabapentin  Maintain fall/syncope precautions

## 2025-01-20 NOTE — ASSESSMENT & PLAN NOTE
Etiology currently unknown-- suspect multifactorial:  Polypharmacy, CKD, hypoxia   CTA of head negative, cardiac enzymes negative, CUS bilateral carotid stenosis of 50-69%  MRI brain negative for anything acute  Encephalopathy labs negative so far  SLP consulted-- no swallowing deficits  PT/OT consulted--- recommend moderate intensity therapy  Holdin chronic pain medications  MRA of head and neck pending   Obtain EEG  Needs time

## 2025-01-20 NOTE — PT/OT/SLP EVAL
Occupational Therapy   Evaluation    Name: Iris Mueller  MRN: [id number]  Admitting Diagnosis: Syncope  Recent Surgery: * No surgery found *      Recommendations:     Discharge Recommendations: Moderate Intensity Therapy  Discharge Equipment Recommendations:  to be determined by next level of care  Barriers to discharge:   (increased assistance with ADLS, fall risk)    Assessment:     Iris Mueller is a 69 y.o. female with a medical diagnosis of Syncope.  Performance deficits affecting function: weakness, impaired endurance, impaired self care skills, impaired functional mobility, gait instability, impaired balance, impaired cognition, decreased upper extremity function, decreased lower extremity function, impaired cardiopulmonary response to activity.      Rehab Prognosis: Good; patient would benefit from acute skilled OT services to address these deficits and reach maximum level of function.       Plan:     Patient to be seen 5 x/week to address the above listed problems via self-care/home management, therapeutic activities, therapeutic exercises  Plan of Care Expires: 02/19/25  Plan of Care Reviewed with: patient, daughter    Subjective     Chief Complaint: She is very weak.  Patient/Family Comments/goals: Her daughter reports that following her last hospitalization she went to [place] but only stayed 2 days.  She has been living at home and she fell in the bathroom.    Occupational Profile:  Living Environment: She lives with her  in a [facility] with 1 small step.  Previous level of function: Independent with self care, (-) driving and light housework   Roles and Routines: primary homemaker  Equipment Used at Home: rollator, bath bench  Assistance upon Discharge:     Pain/Comfort:  Pain Rating 1: 0/10    Patients cultural, spiritual, Denominational conflicts given the current situation: no    Objective:     Communicated with: nurse prior to session.  Patient found supine with  telemetry, bed alarm, blood pressure cuff, peripheral IV, PureWick upon OT entry to room.    General Precautions: Standard, fall, respiratory  Orthopedic Precautions: N/A  Braces: N/A  Respiratory Status: Room air    Occupational Performance:    Bed Mobility:    Patient completed Rolling/Turning to Left with  maximal assistance  Patient completed Rolling/Turning to Right with maximal assistance    Toileting- Dependent for brief placement    Cognitive/Visual Perceptual:  Cognitive/Psychosocial Skills:     -       Oriented to: Person   -       Follows Commands/attention:Follows one-step commands  -       Communication: clear/fluent  -       Memory: Poor immediate recall  -       Safety awareness/insight to disability: impaired   -       Mood/Affect/Coping skills/emotional control: Appropriate to situation    Physical Exam:  Dominant hand:    -       Right  Upper Extremity Range of Motion:     -       Right Upper Extremity: WFL  -       Left Upper Extremity: WFL  Upper Extremity Strength:    -       Right Upper Extremity: WFL  -       Left Upper Extremity: WFL   Strength:    -       Right Upper Extremity: WFL  -       Left Upper Extremity: WFL  Fine Motor Coordination:    -       Intact  Gross motor coordination:   WFL    AMPAC 6 Click ADL:  AMPAC Total Score: 15    Treatment & Education:  She was educated on Role of Occupational Therapy, goals and plan of care.  Discussed importance of OOB activity and functional mobility to negate the negative effects of prolonged bedrest during this hospitalization, safe transfers and d/c planning recommendations.     Patient left supine with all lines intact, call button in reach, and daughter present    GOALS:   Multidisciplinary Problems       Occupational Therapy Goals          Problem: Occupational Therapy    Goal Priority Disciplines Outcome Interventions   Occupational Therapy Goal     OT, PT/OT     Description: Goals to be met by: 02/19/2025     Patient will increase  functional independence with ADLs by performing:    UE Dressing with Supervision.  LE Dressing with Supervision.  Grooming while standing at sink with Set-up Assistance.  Toileting from toilet with Supervision for hygiene and clothing management.   Rolling to Bilateral with Minimal Assistance.   Supine to sit with Minimal Assistance.  Toilet transfer to toilet with Supervision.                         History:     Past Medical History:   Diagnosis Date    Anemia, unspecified     Arthritis     Asthma     COPD (chronic obstructive pulmonary disease)     Encounter for blood transfusion     Hypertension     Obese body habitus     Wound infection 2019    Right knee         Past Surgical History:   Procedure Laterality Date    ANGIOGRAM, CORONARY, WITH LEFT HEART CATHETERIZATION N/A 2022    Procedure: Angiogram, Coronary, with Left Heart Cath;  Surgeon: Jorge Tran MD;  Location: [facility] CATH/EP LAB;  Service: Cardiology;  Laterality: N/A;     SECTION      ECHOCARDIOGRAM,TRANSESOPHAGEAL N/A 2023    Procedure: Transesophageal echo (MARIANO) intra-procedure log documentation;  Surgeon: Provider, Dosc Diagnostic;  Location: [facility] EP LAB;  Service: Cardiology;  Laterality: N/A;    INCISION AND DRAINAGE OF HEMATOMA Left 2024    Procedure: INCISION AND DRAINAGE, HEMATOMA;  Surgeon: Bryson Huerta MD;  Location: [facility] OR;  Service: Orthopedics;  Laterality: Left;    JOINT REPLACEMENT      Knee    KNEE ARTHROPLASTY Right 2019    Procedure: ARTHROPLASTY, KNEE;  Surgeon: Delio Chung MD;  Location: [facility] OR;  Service: Orthopedics;  Laterality: Right;  anesthesia:  general and block    REPLACEMENT OF WOUND VACUUM-ASSISTED CLOSURE DEVICE Right 2019    Procedure: REPLACEMENT, WOUND VAC;  Surgeon: Delio Chung MD;  Location: [facility] OR;  Service: Orthopedics;  Laterality: Right;    TONSILLECTOMY      TREATMENT OF CARDIAC ARRHYTHMIA N/A 2023    Procedure: Cardioversion or Defibrillation;   Surgeon: PJ Betancourt MD;  Location: [facility] EP LAB;  Service: Cardiology;  Laterality: N/A;  AF, MARIANO, DCCV, MAC, EH, 3 Prep    VENTRICULOGRAM, LEFT  12/23/2022    Procedure: Ventriculogram, Left;  Surgeon: Jorge Tran MD;  Location: [facility] CATH/EP LAB;  Service: Cardiology;;       Time Tracking:     OT Date of Treatment: 01/19/25  OT Start Time: 1011  OT Stop Time: 1039  OT Total Time (min): 28 min    Billable Minutes:Evaluation 15  Therapeutic Activity 13    1/19/2025

## 2025-01-20 NOTE — SUBJECTIVE & OBJECTIVE
"Interval History:  Lying in bed, somnolent today.  Worsening hypoxia and requiring increased levels of supplemental O2, 4-5 L per NC.  Brain MRI negative for anything acute.  PT/OT recommends moderate intensity.  SLP evaluated and no deficits.  Encephalopathy workup negative so far.  MRA of head/neck pending.  Right upper extremity significantly edematous, likely due to dependent edema.  Family member at bedside and request Cardiology to evaluate patient while she is here due to her aortic stenosis.  Family member also endorses that she has difficulty using her right arm since having the fall and it limited her participation with therapy and request further imaging be done.  When discussed possible causes of patient's syncope, patient states "she did not pass out.  Everybody keeps saying that.  Her legs get weak and start to give out from under her.  And she is awake during the whole episode."    Review of Systems   Unable to perform ROS: Mental status change     Objective:     Vital Signs (Most Recent):  Temp: 97.7 °F (36.5 °C) (01/20/25 0828)  Pulse: 74 (01/20/25 0828)  Resp: 14 (01/20/25 0728)  BP: 132/87 (01/20/25 0828)  SpO2: (!) 87 % (01/20/25 0828) Vital Signs (24h Range):  Temp:  [96.5 °F (35.8 °C)-98.7 °F (37.1 °C)] 97.7 °F (36.5 °C)  Pulse:  [] 74  Resp:  [14-20] 14  SpO2:  [85 %-99 %] 87 %  BP: (106-132)/(73-87) 132/87     Weight: 110.2 kg (243 lb)  Body mass index is 43.05 kg/m².    Intake/Output Summary (Last 24 hours) at 1/20/2025 0918  Last data filed at 1/20/2025 0706  Gross per 24 hour   Intake 0 ml   Output 1200 ml   Net -1200 ml         Physical Exam  Constitutional:       General: She is not in acute distress.     Appearance: She is obese.   Eyes:      Extraocular Movements: Extraocular movements intact.      Pupils: Pupils are equal, round, and reactive to light.   Cardiovascular:      Rate and Rhythm: Normal rate. Rhythm irregular.      Pulses: Normal pulses.      Heart sounds: Murmur " "heard.      Comments: AFib on telemetry  Pulmonary:      Effort: Pulmonary effort is normal. No respiratory distress.      Breath sounds: Rales (Expiratory rales bilaterally, throughout) present.      Comments: O2 via NC donned.  Abdominal:      General: Bowel sounds are normal. There is no distension.      Palpations: Abdomen is soft.      Tenderness: There is no abdominal tenderness.   Musculoskeletal:         General: Swelling (BUE, R>L) present. Normal range of motion.      Cervical back: Normal range of motion and neck supple.      Right lower leg: Pitting Edema (1+-2+) present.      Left lower leg: Pitting Edema (1+ to 2+) present.   Skin:     General: Skin is warm and dry.   Neurological:      Mental Status: She is lethargic.      Motor: Weakness (Generalized) present.             Significant Labs: All pertinent labs within the past 24 hours have been reviewed.  CBC:   Recent Labs   Lab 01/18/25  1400 01/19/25  0541   WBC 8.27 8.89   HGB 8.1* 7.7*   HCT 28.1* 26.8*    290     CMP:   Recent Labs   Lab 01/18/25  1400 01/19/25  0541    135*   K 4.3 4.8    102   CO2 27 25   GLU 93 98   BUN 27* 23   CREATININE 2.2* 1.9*   CALCIUM 8.7 8.8   PROT 6.7 6.4   ALBUMIN 4.0 3.7   BILITOT 0.6 0.9   ALKPHOS 174* 153*   AST 17 19   ALT 13 10   ANIONGAP 9 8     Cardiac Markers: No results for input(s): "CKMB", "MYOGLOBIN", "BNP", "TROPISTAT" in the last 48 hours.  Troponin:   Recent Labs   Lab 01/19/25  0008   TROPONINIHS 9.3     Urine Studies:   Recent Labs   Lab 01/18/25  1838   COLORU Yellow   APPEARANCEUA Clear   PHUR 7.0   SPECGRAV 1.010   PROTEINUA Negative   GLUCUA Negative   KETONESU Negative   BILIRUBINUA Negative   OCCULTUA Negative   NITRITE Negative   UROBILINOGEN Negative   LEUKOCYTESUR Negative     MRI Brain Without Contrast  Narrative: EXAMINATION:  MRI BRAIN WITHOUT CONTRAST    CLINICAL HISTORY:  Neuro deficit, acute, stroke suspected;.    TECHNIQUE:  Multiplanar multisequence MR imaging of " the brain was performed without contrast.    COMPARISON:  CT head at 14:32 hours and MRI brain dated 12/07/2024    FINDINGS:  Intracranial compartment:    The ventricles and sulci are mildly prominent commensurate with the patient's age.    There is no hemorrhage, mass or midline shift.  There are no extra-axial fluid collections.    There is increased FLAIR and T2 signal within the periventricular white matter and corona radiata compatible with chronic small vessel disease.    There is no abnormality on the diffusion-weighted images to suggest acute infarct.    GRE sequence demonstrate punctate focus of blooming in the right cerebellum and left basal ganglia suggesting prior microhemorrhage which is stable.    There are flow voids in the cavernous portions of the internal carotid arteries and basilar artery indicating patency.    The corpus callosum, cerebellar tonsils, midline structures and orbits are normal.  The paranasal sinuses and mastoid air cells are clear.  Impression: No acute intracranial process    Moderate chronic small vessel disease    Electronically signed by: Iraida Millan  Date:    01/19/2025  Time:    14:30  US Carotid Bilateral  Narrative: EXAMINATION:  US CAROTID BILATERAL    CLINICAL HISTORY:  Syncope;    TECHNIQUE:  Grayscale and color Doppler ultrasound examination of the carotid and vertebral artery systems bilaterally.  Stenosis estimates are per the NASCET measurement criteria.    COMPARISON:  None.    FINDINGS:  Right:    Internal Carotid Artery (ICA) peak systolic velocity 146.1 cm/sec    ICA/CCA peak systolic velocity ratio: 2.3    Plaque formation: Heterogeneous    Vertebral artery: Antegrade flow and normal waveform.    Left:    Internal Carotid Artery (ICA)  peak systolic velocity 149 cm/sec    ICA/CCA peak systolic velocity ratio: 3.6    Plaque formation: Heterogeneous    Vertebral artery: Antegrade flow and normal waveform.  Impression: 50-69% stenosis of the right  ICA.    50-69% stenosis of the left ICA.    Electronically signed by: Marce Narvaez  Date:    01/19/2025  Time:    02:52        Significant Imaging: I have reviewed all pertinent imaging results/findings within the past 24 hours.

## 2025-01-20 NOTE — ASSESSMENT & PLAN NOTE
Patient's COPD is controlled currently.  Patient is currently off COPD Pathway. Continue scheduled inhalers  nebulizers and Supplemental oxygen and monitor respiratory status closely.   Wean supplemental O2 as tolerated  =============================================  Worsening hypoxia  Obtain CXR  IV Lasix x1

## 2025-01-20 NOTE — ASSESSMENT & PLAN NOTE
Echocardiogram with evidence of aortic stenosis that is severe . The patient's most recent echocardiogram result is listed below. We will manage the valvular abnormality by monitor for volume overload    Echo Saline Bubble? No    Result Date: 12/8/2024    Left Ventricle: The left ventricle is normal in size. Moderately   increased wall thickness. Unable to assess wall motion. There is normal   systolic function with a visually estimated ejection fraction of 55 - 60%.   Unable to assess diastolic function due to atrial fibrillation.    Right Ventricle: Right ventricle was not well visualized due to poor   acoustic window.    Left Atrium: Left atrium is severely dilated.    Right Atrium: Right atrium is severely dilated.    Aortic Valve: There is moderate to severe stenosis. Aortic valve area   by VTI is 1.0 cm². Aortic valve peak velocity is 3.4 m/s. Mean gradient is   27.0 mmHg. The dimensionless index is 0.32. There is mild aortic   regurgitation.    IVC/SVC: Elevated venous pressure at 15 mmHg.        ===========================================  Following with Dr. Ghosh for valve workup  Monitor for volume overload  Consult Cardiology per family request

## 2025-01-20 NOTE — CARE UPDATE
01/20/25 0720   Patient Assessment/Suction   Level of Consciousness (AVPU) alert   Respiratory Effort Normal;Unlabored   Expansion/Accessory Muscles/Retractions no use of accessory muscles   All Lung Fields Breath Sounds diminished   ELIAS Breath Sounds coarse   LLL Breath Sounds diminished   RUL Breath Sounds coarse   RML Breath Sounds diminished;coarse   RLL Breath Sounds diminished   Rhythm/Pattern, Respiratory no shortness of breath reported   Cough Frequency infrequent   Cough Type loose;nonproductive   PRE-TX-O2   Device (Oxygen Therapy) nasal cannula   $ Is the patient on Low Flow Oxygen? Yes   Flow (L/min) (Oxygen Therapy) 4   Oxygen Concentration (%) 36   SpO2 97 %   Pulse Oximetry Type Intermittent   $ Pulse Oximetry - Multiple Charge Pulse Oximetry - Multiple   Pulse 70   Resp 18   Aerosol Therapy   $ Aerosol Therapy Charges Aerosol Treatment   Daily Review of Necessity (SVN) completed   Respiratory Treatment Status (SVN) continuous   Treatment Route (SVN) oxygen;mask   Patient Position HOB elevated   Post Treatment Assessment (SVN) increased aeration   Signs of Intolerance (SVN) none   Breath Sounds Post-Respiratory Treatment   Throughout All Fields Post-Treatment All Fields   Throughout All Fields Post-Treatment aeration increased   Post-treatment Heart Rate (beats/min) 83   Post-treatment Resp Rate (breaths/min) 16   Education   $ Education 15 min;Bronchodilator;Oxygen

## 2025-01-20 NOTE — PT/OT/SLP PROGRESS
Occupational Therapy      Patient Name:  Iris Mueller   MRN:  [id number]    Patient not seen today secondary to she was unarousable in AM and in too much pain in the PM. Will follow-up next treatment date.    1/20/2025

## 2025-01-20 NOTE — CARE UPDATE
01/19/25 1955   Patient Assessment/Suction   Level of Consciousness (AVPU) alert   Respiratory Effort Normal;Unlabored   Expansion/Accessory Muscles/Retractions no use of accessory muscles;no retractions;expansion symmetric   All Lung Fields Breath Sounds diminished   PRE-TX-O2   Device (Oxygen Therapy) nasal cannula   $ Is the patient on Low Flow Oxygen? Yes   Flow (L/min) (Oxygen Therapy) 4   SpO2 95 %   Pulse Oximetry Type Intermittent   $ Pulse Oximetry - Multiple Charge Pulse Oximetry - Multiple   Pulse 81   Resp 16   Aerosol Therapy   $ Aerosol Therapy Charges Aerosol Treatment   Daily Review of Necessity (SVN) completed   Respiratory Treatment Status (SVN) given   Treatment Route (SVN) oxygen;mask   Patient Position HOB elevated   Post Treatment Assessment (SVN) increased aeration   Signs of Intolerance (SVN) none   Education   $ Education 15 min;Bronchodilator;Oxygen

## 2025-01-21 PROBLEM — I67.1 ANTERIOR CEREBRAL ARTERY ANEURYSM: Status: ACTIVE | Noted: 2025-01-21

## 2025-01-21 LAB
ABO + RH BLD: NORMAL
ALBUMIN SERPL BCP-MCNC: 3.2 G/DL (ref 3.5–5.2)
ALP SERPL-CCNC: 104 U/L (ref 55–135)
ALT SERPL W/O P-5'-P-CCNC: 7 U/L (ref 10–44)
ANION GAP SERPL CALC-SCNC: 7 MMOL/L (ref 8–16)
AST SERPL-CCNC: 11 U/L (ref 10–40)
BASOPHILS # BLD AUTO: 0.02 K/UL (ref 0–0.2)
BASOPHILS NFR BLD: 0.2 % (ref 0–1.9)
BILIRUB SERPL-MCNC: 0.8 MG/DL (ref 0.1–1)
BLD GP AB SCN CELLS X3 SERPL QL: NORMAL
BLD PROD TYP BPU: NORMAL
BLOOD UNIT EXPIRATION DATE: NORMAL
BLOOD UNIT TYPE CODE: 9500
BLOOD UNIT TYPE: NORMAL
BUN SERPL-MCNC: 18 MG/DL (ref 8–23)
CALCIUM SERPL-MCNC: 8.8 MG/DL (ref 8.7–10.5)
CHLORIDE SERPL-SCNC: 101 MMOL/L (ref 95–110)
CO2 SERPL-SCNC: 27 MMOL/L (ref 23–29)
CODING SYSTEM: NORMAL
CREAT SERPL-MCNC: 1.7 MG/DL (ref 0.5–1.4)
CROSSMATCH INTERPRETATION: NORMAL
DIFFERENTIAL METHOD BLD: ABNORMAL
DISPENSE STATUS: NORMAL
EOSINOPHIL # BLD AUTO: 0 K/UL (ref 0–0.5)
EOSINOPHIL NFR BLD: 0.1 % (ref 0–8)
ERYTHROCYTE [DISTWIDTH] IN BLOOD BY AUTOMATED COUNT: 18.8 % (ref 11.5–14.5)
EST. GFR  (NO RACE VARIABLE): 31.9 ML/MIN/1.73 M^2
GLUCOSE SERPL-MCNC: 106 MG/DL (ref 70–110)
HCT VFR BLD AUTO: 24.5 % (ref 37–48.5)
HGB BLD-MCNC: 7.1 G/DL (ref 12–16)
IMM GRANULOCYTES # BLD AUTO: 0.05 K/UL (ref 0–0.04)
IMM GRANULOCYTES NFR BLD AUTO: 0.6 % (ref 0–0.5)
LYMPHOCYTES # BLD AUTO: 0.5 K/UL (ref 1–4.8)
LYMPHOCYTES NFR BLD: 5.9 % (ref 18–48)
MAGNESIUM SERPL-MCNC: 1.8 MG/DL (ref 1.6–2.6)
MCH RBC QN AUTO: 26 PG (ref 27–31)
MCHC RBC AUTO-ENTMCNC: 29 G/DL (ref 32–36)
MCV RBC AUTO: 90 FL (ref 82–98)
MONOCYTES # BLD AUTO: 0.6 K/UL (ref 0.3–1)
MONOCYTES NFR BLD: 6.6 % (ref 4–15)
NEUTROPHILS # BLD AUTO: 7.2 K/UL (ref 1.8–7.7)
NEUTROPHILS NFR BLD: 86.6 % (ref 38–73)
NRBC BLD-RTO: 0 /100 WBC
NUM UNITS TRANS PACKED RBC: NORMAL
PHOSPHATE SERPL-MCNC: 3.6 MG/DL (ref 2.7–4.5)
PLATELET # BLD AUTO: 271 K/UL (ref 150–450)
PMV BLD AUTO: 9 FL (ref 9.2–12.9)
POTASSIUM SERPL-SCNC: 3.6 MMOL/L (ref 3.5–5.1)
PROT SERPL-MCNC: 5.6 G/DL (ref 6–8.4)
RBC # BLD AUTO: 2.73 M/UL (ref 4–5.4)
SODIUM SERPL-SCNC: 135 MMOL/L (ref 136–145)
SPECIMEN OUTDATE: NORMAL
WBC # BLD AUTO: 8.34 K/UL (ref 3.9–12.7)

## 2025-01-21 PROCEDURE — 27000221 HC OXYGEN, UP TO 24 HOURS

## 2025-01-21 PROCEDURE — 86901 BLOOD TYPING SEROLOGIC RH(D): CPT | Performed by: NURSE PRACTITIONER

## 2025-01-21 PROCEDURE — 84100 ASSAY OF PHOSPHORUS: CPT | Performed by: NURSE PRACTITIONER

## 2025-01-21 PROCEDURE — 25000242 PHARM REV CODE 250 ALT 637 W/ HCPCS: Performed by: HOSPITALIST

## 2025-01-21 PROCEDURE — 94761 N-INVAS EAR/PLS OXIMETRY MLT: CPT

## 2025-01-21 PROCEDURE — 86920 COMPATIBILITY TEST SPIN: CPT | Performed by: NURSE PRACTITIONER

## 2025-01-21 PROCEDURE — 94640 AIRWAY INHALATION TREATMENT: CPT | Mod: XB

## 2025-01-21 PROCEDURE — 12000002 HC ACUTE/MED SURGE SEMI-PRIVATE ROOM

## 2025-01-21 PROCEDURE — 36415 COLL VENOUS BLD VENIPUNCTURE: CPT | Performed by: NURSE PRACTITIONER

## 2025-01-21 PROCEDURE — 83735 ASSAY OF MAGNESIUM: CPT | Performed by: NURSE PRACTITIONER

## 2025-01-21 PROCEDURE — 80053 COMPREHEN METABOLIC PANEL: CPT | Performed by: NURSE PRACTITIONER

## 2025-01-21 PROCEDURE — 25000003 PHARM REV CODE 250: Performed by: NURSE PRACTITIONER

## 2025-01-21 PROCEDURE — P9040 RBC LEUKOREDUCED IRRADIATED: HCPCS | Performed by: NURSE PRACTITIONER

## 2025-01-21 PROCEDURE — 99900031 HC PATIENT EDUCATION (STAT)

## 2025-01-21 PROCEDURE — 85025 COMPLETE CBC W/AUTO DIFF WBC: CPT | Performed by: NURSE PRACTITIONER

## 2025-01-21 PROCEDURE — 36430 TRANSFUSION BLD/BLD COMPNT: CPT

## 2025-01-21 RX ORDER — IRON POLYSACCHARIDE COMPLEX 150 MG
150 CAPSULE ORAL DAILY
Status: DISCONTINUED | OUTPATIENT
Start: 2025-01-22 | End: 2025-01-28 | Stop reason: HOSPADM

## 2025-01-21 RX ORDER — FUROSEMIDE 10 MG/ML
20 INJECTION INTRAMUSCULAR; INTRAVENOUS ONCE
Status: DISCONTINUED | OUTPATIENT
Start: 2025-01-21 | End: 2025-01-22

## 2025-01-21 RX ORDER — HYDROCODONE BITARTRATE AND ACETAMINOPHEN 500; 5 MG/1; MG/1
TABLET ORAL
Status: DISCONTINUED | OUTPATIENT
Start: 2025-01-21 | End: 2025-01-28 | Stop reason: HOSPADM

## 2025-01-21 RX ORDER — HYDROCODONE BITARTRATE AND ACETAMINOPHEN 5; 325 MG/1; MG/1
1 TABLET ORAL 3 TIMES DAILY
Status: DISCONTINUED | OUTPATIENT
Start: 2025-01-21 | End: 2025-01-22

## 2025-01-21 RX ADMIN — ACETAMINOPHEN 325 MG: 325 TABLET ORAL at 03:01

## 2025-01-21 RX ADMIN — AMIODARONE HYDROCHLORIDE 200 MG: 200 TABLET ORAL at 08:01

## 2025-01-21 RX ADMIN — METOPROLOL SUCCINATE 50 MG: 50 TABLET, FILM COATED, EXTENDED RELEASE ORAL at 08:01

## 2025-01-21 RX ADMIN — MUPIROCIN 1 G: 20 OINTMENT TOPICAL at 09:01

## 2025-01-21 RX ADMIN — MUPIROCIN 1 G: 20 OINTMENT TOPICAL at 08:01

## 2025-01-21 RX ADMIN — ARFORMOTEROL TARTRATE 15 MCG: 15 SOLUTION RESPIRATORY (INHALATION) at 08:01

## 2025-01-21 RX ADMIN — ARFORMOTEROL TARTRATE 15 MCG: 15 SOLUTION RESPIRATORY (INHALATION) at 07:01

## 2025-01-21 RX ADMIN — BUPROPION HYDROCHLORIDE 150 MG: 150 TABLET, FILM COATED, EXTENDED RELEASE ORAL at 09:01

## 2025-01-21 RX ADMIN — METOPROLOL SUCCINATE 50 MG: 50 TABLET, FILM COATED, EXTENDED RELEASE ORAL at 09:01

## 2025-01-21 RX ADMIN — APIXABAN 5 MG: 5 TABLET, FILM COATED ORAL at 08:01

## 2025-01-21 RX ADMIN — HYDROCODONE BITARTRATE AND ACETAMINOPHEN 1 TABLET: 5; 325 TABLET ORAL at 09:01

## 2025-01-21 RX ADMIN — ATORVASTATIN CALCIUM 10 MG: 10 TABLET, FILM COATED ORAL at 09:01

## 2025-01-21 RX ADMIN — BUDESONIDE INHALATION 0.5 MG: 0.5 SUSPENSION RESPIRATORY (INHALATION) at 07:01

## 2025-01-21 RX ADMIN — BUDESONIDE INHALATION 0.5 MG: 0.5 SUSPENSION RESPIRATORY (INHALATION) at 08:01

## 2025-01-21 RX ADMIN — IPRATROPIUM BROMIDE 0.5 MG: 0.5 SOLUTION RESPIRATORY (INHALATION) at 01:01

## 2025-01-21 RX ADMIN — IPRATROPIUM BROMIDE 0.5 MG: 0.5 SOLUTION RESPIRATORY (INHALATION) at 08:01

## 2025-01-21 RX ADMIN — BUPROPION HYDROCHLORIDE 150 MG: 150 TABLET, FILM COATED, EXTENDED RELEASE ORAL at 08:01

## 2025-01-21 RX ADMIN — FLUOXETINE HYDROCHLORIDE 40 MG: 20 CAPSULE ORAL at 08:01

## 2025-01-21 RX ADMIN — APIXABAN 5 MG: 5 TABLET, FILM COATED ORAL at 09:01

## 2025-01-21 RX ADMIN — IPRATROPIUM BROMIDE 0.5 MG: 0.5 SOLUTION RESPIRATORY (INHALATION) at 07:01

## 2025-01-21 RX ADMIN — MIRTAZAPINE 15 MG: 15 TABLET, FILM COATED ORAL at 09:01

## 2025-01-21 NOTE — PROGRESS NOTES
[facility] Medicine  Progress Note    Patient Name: Iris Mueller  MRN: [id number]  Patient Class: IP- Inpatient   Admission Date: 1/18/2025  Length of Stay: 0 days  Attending Physician: Garcia Soriano MD  Primary Care Provider: Elkin You MD        Subjective     Principal Problem:Syncope        HPI:  69-year-old female with a past medical history of COPD asthma, HTN, obesity, CHF, Right knee wound infection, arthritis who presented to the emergency room for  syncope and fall.  She reports just prior to fall and she felt dizzy.  She is unaware of the events of the fall.  Patient has had frequent falls recently. She fell getting out of the shower. She is not entirely sure what happened. She is not sure why she fell. She has pain all over. Mostly complains of pain to her bilateral knees and to her chest. She says that she does not know why she fell but she was in the shower when it happened. She does not think that she slipped a she does take Norco 3 times a day and gabapentin.     In the ER, patient atrial fibrillation,t mild anemia, with H&H 8.7 and 28.1, JEFF with BUN 27 creatinine 2.2, baseline creatinine is 1.81., L dysphagia is 174, UA negative for infection CT head nonacute, right humerus x-ray nonacute, pelvic x-ray no acute fractures, right knee with diffuse soft tissue edema, chest x-ray nonacute with some cardiomegaly    Admit to hospital medicine for syncope workup consult PT/OT , JEFF    I spoke to daughter who states patient recently was seen by cardiologist who stopped 1 of her blood pressure medicines but daughter is concerned patient did not stopped taking the blood pressure medication and her blood pressure dropped    Overview/Hospital Course:  No notes on file    Interval History:  Sitting up in bed, awake and alert, Ox3.  Encephalopathy appears to have resolved.  Endorses having generalized pain, worse in her arms.  Still requiring supplemental O2 at  5 L via NC.  MRA of head showed multifocal gradient and 50% stenosis of the left ICA cavernous segment, questionable 1.8 mm aneurysm arising from the left TABATHA A1 segment, developmentally diminutive were narrow left TABATHA A1 segment.  Results discussed with patient and endorses that the left ICA stenosis is a chronic issue.  Patient unable to get MRA of neck or MRI of right shoulder yesterday due to AMS and uncooperativeness.  Hemoglobin remains low at 7.1 this morning, patient agreeable to blood transfusion.    Review of Systems   Unable to perform ROS: Mental status change     Objective:     Vital Signs (Most Recent):  Temp: 97.4 °F (36.3 °C) (01/21/25 0747)  Pulse: 82 (01/21/25 0747)  Resp: 18 (01/21/25 0747)  BP: 114/82 (01/21/25 0747)  SpO2: 97 % (01/21/25 0747) Vital Signs (24h Range):  Temp:  [97.4 °F (36.3 °C)-98.5 °F (36.9 °C)] 97.4 °F (36.3 °C)  Pulse:  [80-97] 82  Resp:  [16-20] 18  SpO2:  [88 %-97 %] 97 %  BP: (104-156)/(67-85) 114/82     Weight: 110.2 kg (243 lb)  Body mass index is 43.05 kg/m².    Intake/Output Summary (Last 24 hours) at 1/21/2025 0940  Last data filed at 1/21/2025 0915  Gross per 24 hour   Intake 1310 ml   Output 1450 ml   Net -140 ml         Physical Exam  Constitutional:       General: She is not in acute distress.     Appearance: She is obese.   Eyes:      Extraocular Movements: Extraocular movements intact.      Pupils: Pupils are equal, round, and reactive to light.   Cardiovascular:      Rate and Rhythm: Normal rate. Rhythm irregular.      Pulses: Normal pulses.      Heart sounds: Murmur heard.      Comments: AFib on telemetry  Pulmonary:      Effort: Pulmonary effort is normal. No respiratory distress.      Breath sounds: Normal breath sounds.      Comments: O2 via NC donned.  Abdominal:      General: Bowel sounds are normal. There is no distension.      Palpations: Abdomen is soft.      Tenderness: There is no abdominal tenderness.   Musculoskeletal:         General: Swelling  (BUE, R>L) present. Normal range of motion.      Cervical back: Normal range of motion and neck supple.      Right lower leg: Pitting Edema (1+-2+) present.      Left lower leg: Pitting Edema (1+ to 2+) present.   Skin:     General: Skin is warm and dry.   Neurological:      Mental Status: She is alert and oriented to person, place, and time.      Motor: Weakness (Generalized) present.             Significant Labs: All pertinent labs within the past 24 hours have been reviewed.  CBC:   Recent Labs   Lab 01/20/25  0902 01/21/25  0937   WBC 8.42 8.34   HGB 7.0* 7.1*   HCT 24.5* 24.5*    271     CMP:   Recent Labs   Lab 01/20/25  0902 01/21/25  0937    135*   K 3.8 3.6    101   CO2 27 27    106   BUN 19 18   CREATININE 1.8* 1.7*   CALCIUM 8.6* 8.8   PROT 5.6* 5.6*   ALBUMIN 3.3* 3.2*   BILITOT 0.7 0.8   ALKPHOS 122 104   AST 10 11   ALT 9* 7*   ANIONGAP 8 7*     Cardiac Markers:   Recent Labs   Lab 01/20/25  0902   BNP 1,065*     Troponin:   Recent Labs   Lab 01/20/25  0902   TROPONINIHS 10.6       MRA Brain without contrast  Narrative: EXAMINATION:  MRA BRAIN WITHOUT CONTRAST    CLINICAL HISTORY:  Neuro deficit, acute, stroke suspected;    TECHNIQUE:  3D TOF MRA without contrast was performed, with maximum intensity projections reviewed.    FINDINGS:  Comparison to prior exams including MRI brain same day.  Motion artifact limits the exam.  The intracranial segments of the distal vertebral arteries and the basilar artery are widely patent.  The distal cervical and petrous segments of the internal carotid arteries are widely patent.    There is multifocal greater than 50% stenoses of the left ICA cavernous segment, with the right ICA cavernous and supraclinoid segments widely patent.  There is a developmentally diminutive or narrowed left TABATHA A1 segment, with question of 1.8 mm aneurysm arising from the distal left TABATHA A1 segment, axial image 52 series 8.    There is fetal origin of the right  posterior cerebral artery.  The bilateral anterior, middle and posterior cerebral arteries are otherwise widely patent and taper appropriately.  Impression: 1. Multifocal greater than 50% stenoses of the left ICA cavernous segment.  2. Questionable 1.8 mm aneurysm arising from the left TABATHA A1 segment.  Consider further evaluation with digital subtraction angiography.  3. Developmentally diminutive or narrowed left TABATHA A1 segment.    Electronically signed by: Morteza Houston  Date:    01/21/2025  Time:    07:31        Significant Imaging: I have reviewed all pertinent imaging results/findings within the past 24 hours.    Assessment and Plan     * Syncope  Possibly medication induced (on hydrocodone and gabapentin) vs neurologic etiology vs cardiogenic/aortic stenosis possibly contributing  Family endorses that she did not pass out, but that her legs got weak and she went down, and patient remained awake and alert during episode.  They also endorsed that this is not an unusual/new issue for her.  =================================  CT of head negative  UA negative for infection   Last TTE 12/08/2024: EF 55-60%, indeterminate diastolic function, moderate to severe AS  MRI Brain:  Negative for anything acute  MRA of head > 50% stenosis of left internal carotid artery  cavernous segment. Questionable 1.8 mm aneurysm arising from the left TABATHA A1 segment.  Consider further evaluation with digital subtraction angiography. Developmentally diminutive or narrowed left TABATHA A1 segment.   =======================================  MRA of neck:  Pending--not obtain as of yet due to patient having AMS and uncooperativeness  Orthostatics unable to be obtained due to AMS  PT/OT recommends moderate intensity   Holding hydrcodone and gabapentin  Maintain fall/syncope precautions    Acute encephalopathy  Appears to have resolved today-- patient now awake alert and oriented x3   Suspect multifactorial:  Polypharmacy, CKD, hypoxia   CT of head  negative, cardiac enzymes negative, CUS bilateral carotid stenosis of 50-69%  MRI brain negative for anything acute  Encephalopathy labs negative so far  SLP consulted-- no swallowing deficits  PT/OT consulted--- recommend moderate intensity therapy  MRA of head > 50% stenosis of left internal carotid artery  cavernous segment. Questionable 1.8 mm aneurysm arising from the left TABATHA A1 segment.  Consider further evaluation with digital subtraction angiography. Developmentally diminutive or narrowed left TABATHA A1 segment.  EEG pending  Resume chronic medications now that patient is awake and alert    Acute kidney injury superimposed on stage 4 chronic kidney disease  JEFF is likely due to pre-renal azotemia due to dehydration. Baseline creatinine is  1.7- 1.9 . Most recent creatinine and eGFR are listed below.  Recent Labs     01/18/25  1400 01/19/25  0541 01/20/25  0902   CREATININE 2.2* 1.9* 1.8*   EGFRNORACEVR 23.4* 27.9* 29.8*        Plan  - very mild/ JEFF is resolved--creatinine at baseline  - Avoid nephrotoxins and renally dose meds for GFR listed above  - Monitor urine output, serial BMP, and adjust therapy as needed    Moderate aortic stenosis  Echocardiogram with evidence of aortic stenosis that is severe . The patient's most recent echocardiogram result is listed below. We will manage the valvular abnormality by monitor for volume overload    Echo Saline Bubble? No    Result Date: 12/8/2024    Left Ventricle: The left ventricle is normal in size. Moderately   increased wall thickness. Unable to assess wall motion. There is normal   systolic function with a visually estimated ejection fraction of 55 - 60%.   Unable to assess diastolic function due to atrial fibrillation.    Right Ventricle: Right ventricle was not well visualized due to poor   acoustic window.    Left Atrium: Left atrium is severely dilated.    Right Atrium: Right atrium is severely dilated.    Aortic Valve: There is moderate to severe stenosis.  Aortic valve area   by VTI is 1.0 cm². Aortic valve peak velocity is 3.4 m/s. Mean gradient is   27.0 mmHg. The dimensionless index is 0.32. There is mild aortic   regurgitation.    IVC/SVC: Elevated venous pressure at 15 mmHg.        ===========================================  Following with Dr. Ghosh for valve workup  Monitor for volume overload  Cardiology consulted per family request    Anterior cerebral artery aneurysm  MRA Brain:Questionable 1.8 mm aneurysm arising from the left TABATHA A1 segment.   ===================================  Small size--> <7mm   Maintain adequate blood pressure control  Will refer to Neurosurgery for evaluation when discharged      Chronic pain syndrome  Holding chronic hydrocodone and gabapentin due to encephalopathy    Chronic combined systolic and diastolic heart failure  Patient is identified as having Combined Systolic and Diastolic heart failure that is Chronic. CHF is currently controlled. Latest ECHO performed and demonstrates- Results for orders placed during the hospital encounter of 12/07/24    Echo Saline Bubble? No    Interpretation Summary    Left Ventricle: The left ventricle is normal in size. Moderately increased wall thickness. Unable to assess wall motion. There is normal systolic function with a visually estimated ejection fraction of 55 - 60%. Unable to assess diastolic function due to atrial fibrillation.    Right Ventricle: Right ventricle was not well visualized due to poor acoustic window.    Left Atrium: Left atrium is severely dilated.    Right Atrium: Right atrium is severely dilated.    Aortic Valve: There is moderate to severe stenosis. Aortic valve area by VTI is 1.0 cm². Aortic valve peak velocity is 3.4 m/s. Mean gradient is 27.0 mmHg. The dimensionless index is 0.32. There is mild aortic regurgitation.    IVC/SVC: Elevated venous pressure at 15 mmHg.  . Continue Beta Blocker and monitor clinical status closely. Monitor on telemetry. Patient is off CHF  pathway.  Monitor strict Is&Os and daily weights.  Not on fluid restriction. Cardiology is not consulted. Continue to stress to patient importance of self efficacy and  on diet for CHF. Last BNP reviewed- and noted below   Component Ref Range & Units 13 d ago  (1/6/25)   BNP 0 - 99 pg/mL 1,065   .  ===================================  Troponin normal   Have been holding chronic diuretics due to JEFF, which has improved  Given IV Lasix x1 1/20 for increased oxygen demands  CXR unremarkable/no edema  Patient more lucid and should be able to start taking chronic diuretics    Persistent atrial fibrillation  Patient has persistent (7 days or more) atrial fibrillation. Patient is currently in atrial fibrillation. [id number] Score: 3. The patients heart rate in the last 24 hours is as follows:  Pulse  Min: 80  Max: 97     Antiarrhythmics  amiodarone tablet 200 mg, Daily, Oral  metoprolol succinate (TOPROL-XL) 24 hr tablet 50 mg, 2 times daily, Oral    Anticoagulants  apixaban tablet 5 mg, 2 times daily, Oral    Plan  - Replete lytes with a goal of K>4, Mg >2  - Patient is anticoagulated, see medications listed above.  - Patient's afib is currently controlled    Chronic obstructive pulmonary disease  Patient's COPD is controlled currently.  Patient is currently off COPD Pathway. Continue scheduled inhalers  nebulizers and Supplemental oxygen and monitor respiratory status closely.   Wean supplemental O2 as tolerated  =============================================  Hypoxia seems to have stabilized-- requiring 5 L via NC   CXR negative for anything acute  Gently diuresed 1/20  ABG positive hypoxia and very mild hypercarbia  Now that patient is lucid, monitor for improvement.    Morbid obesity  Body mass index is 43.05 kg/m². Morbid obesity complicates all aspects of disease management from diagnostic modalities to treatment. Weight loss encouraged and health benefits explained to patient.     Anemia  Anemia is likely due  to Iron deficiency and chronic disease due to Chronic Kidney Disease. Most recent hemoglobin and hematocrit are listed below.  Recent Labs     01/19/25  0541 01/20/25  0902 01/21/25  0937   HGB 7.7* 7.0* 7.1*   HCT 26.8* 24.5* 24.5*     Plan  - Monitor serial CBC: Daily  - Transfuse PRBC if patient becomes hemodynamically unstable, symptomatic or H/H drops below 7/21.  - Patient has not received any PRBC transfusions to date  - Patient's anemia is currently worsening. Will transfuse 1 unit PRBC today --patient with history of HF, hemoglobin did not improve after diuresis, recommend maintain hemoglobin approximately 8.0  - resume chronic supplementation      VTE Risk Mitigation (From admission, onward)           Ordered     IP VTE HIGH RISK PATIENT  Once         01/19/25 1559     Reason for No Pharmacological VTE Prophylaxis  Once        Question:  Reasons:  Answer:  Patient is Ambulatory    01/19/25 1559     apixaban tablet 5 mg  2 times daily         01/18/25 1949                    Discharge Planning   VALERIANO: 1/24/2025     Code Status: Full Code   Medical Readiness for Discharge Date:   Discharge Plan A: Home Health                Please place Justification for DME        Senia Peña NP  Department of Hospital Medicine   [facility]

## 2025-01-21 NOTE — PLAN OF CARE
Problem: Skin Injury Risk Increased  Goal: Skin Health and Integrity  Outcome: Progressing     Problem: Adult Inpatient Plan of Care  Goal: Plan of Care Review  Outcome: Progressing  Goal: Patient-Specific Goal (Individualized)  Outcome: Progressing  Goal: Absence of Hospital-Acquired Illness or Injury  Outcome: Progressing  Goal: Optimal Comfort and Wellbeing  Outcome: Progressing  Goal: Readiness for Transition of Care  Outcome: Progressing     Problem: Bariatric Environmental Safety  Goal: Safety Maintained with Care  Outcome: Progressing     Problem: Acute Kidney Injury/Impairment  Goal: Fluid and Electrolyte Balance  Outcome: Progressing  Goal: Improved Oral Intake  Outcome: Progressing  Goal: Effective Renal Function  Outcome: Progressing     Problem: Wound  Goal: Optimal Coping  Outcome: Progressing  Goal: Optimal Functional Ability  Outcome: Progressing  Goal: Absence of Infection Signs and Symptoms  Outcome: Progressing  Goal: Improved Oral Intake  Outcome: Progressing  Goal: Optimal Pain Control and Function  Outcome: Progressing  Goal: Skin Health and Integrity  Outcome: Progressing  Goal: Optimal Wound Healing  Outcome: Progressing     Problem: Fall Injury Risk  Goal: Absence of Fall and Fall-Related Injury  Outcome: Progressing

## 2025-01-21 NOTE — ASSESSMENT & PLAN NOTE
Anemia is likely due to Iron deficiency and chronic disease due to Chronic Kidney Disease. Most recent hemoglobin and hematocrit are listed below.  Recent Labs     01/19/25  0541 01/20/25  0902 01/21/25  0937   HGB 7.7* 7.0* 7.1*   HCT 26.8* 24.5* 24.5*     Plan  - Monitor serial CBC: Daily  - Transfuse PRBC if patient becomes hemodynamically unstable, symptomatic or H/H drops below 7/21.  - Patient has not received any PRBC transfusions to date  - Patient's anemia is currently worsening. Will transfuse 1 unit PRBC today --patient with history of HF, hemoglobin did not improve after diuresis, recommend maintain hemoglobin approximately 8.0  - resume chronic supplementation

## 2025-01-21 NOTE — CARE UPDATE
01/21/25 0718   Patient Assessment/Suction   Level of Consciousness (AVPU) alert   Respiratory Effort Normal;Unlabored   Expansion/Accessory Muscles/Retractions no use of accessory muscles   All Lung Fields Breath Sounds clear;diminished   Rhythm/Pattern, Respiratory unlabored   Cough Frequency no cough   PRE-TX-O2   Device (Oxygen Therapy) nasal cannula   $ Is the patient on Low Flow Oxygen? Yes   Flow (L/min) (Oxygen Therapy) 5   SpO2 (!) 90 %   Pulse Oximetry Type Intermittent   $ Pulse Oximetry - Multiple Charge Pulse Oximetry - Multiple   Pulse 81   Resp 20   Aerosol Therapy   $ Aerosol Therapy Charges Aerosol Treatment   Daily Review of Necessity (SVN) completed   Respiratory Treatment Status (SVN) given   Treatment Route (SVN) mask;oxygen   Patient Position HOB elevated   Post Treatment Assessment (SVN) breath sounds improved   Signs of Intolerance (SVN) none   Breath Sounds Post-Respiratory Treatment   Throughout All Fields Post-Treatment All Fields   Throughout All Fields Post-Treatment aeration increased   Post-treatment Heart Rate (beats/min) 88   Post-treatment Resp Rate (breaths/min) 18   Education   $ Education Bronchodilator;15 min

## 2025-01-21 NOTE — ASSESSMENT & PLAN NOTE
Patient's COPD is controlled currently.  Patient is currently off COPD Pathway. Continue scheduled inhalers  nebulizers and Supplemental oxygen and monitor respiratory status closely.   Wean supplemental O2 as tolerated  =============================================  Hypoxia seems to have stabilized-- requiring 5 L via NC   CXR negative for anything acute  Gently diuresed 1/20  ABG positive hypoxia and very mild hypercarbia  Now that patient is lucid, monitor for improvement.   DC instructions

## 2025-01-21 NOTE — CARE UPDATE
01/20/25 1942   Patient Assessment/Suction   Level of Consciousness (AVPU) alert   Respiratory Effort Normal;Unlabored   Expansion/Accessory Muscles/Retractions no use of accessory muscles   All Lung Fields Breath Sounds diminished;rhonchi   Rhythm/Pattern, Respiratory pattern regular;unlabored   Cough Frequency with stimulation   Cough Type congested;nonproductive   PRE-TX-O2   Device (Oxygen Therapy) high flow mask   $ Is the patient on Low Flow Oxygen? Yes   Flow (L/min) (Oxygen Therapy) 4   SpO2 95 %   Pulse Oximetry Type Intermittent   $ Pulse Oximetry - Multiple Charge Pulse Oximetry - Multiple   Pulse 85   Resp 18   Positioning HOB elevated 45 degrees   Positioning   Head of Bed (HOB) Positioning HOB at 45 degrees   Positioning/Transfer Devices pillows;in use   Aerosol Therapy   $ Aerosol Therapy Charges Aerosol Treatment   Treatment Route (SVN) mask;oxygen   Patient Position HOB elevated   Post Treatment Assessment (SVN) breath sounds improved   Signs of Intolerance (SVN) none   Education   $ Education Bronchodilator;Oxygen;15 min

## 2025-01-21 NOTE — SUBJECTIVE & OBJECTIVE
Interval History:  Sitting up in bed, awake and alert, Ox3.  Encephalopathy appears to have resolved.  Endorses having generalized pain, worse in her arms.  Still requiring supplemental O2 at 5 L via NC.  MRA of head showed multifocal gradient and 50% stenosis of the left ICA cavernous segment, questionable 1.8 mm aneurysm arising from the left TABATHA A1 segment, developmentally diminutive were narrow left TABATHA A1 segment.  Results discussed with patient and endorses that the left ICA stenosis is a chronic issue.  Patient unable to get MRA of neck or MRI of right shoulder yesterday due to AMS and uncooperativeness.  Hemoglobin remains low at 7.1 this morning, patient agreeable to blood transfusion.    Review of Systems   Unable to perform ROS: Mental status change     Objective:     Vital Signs (Most Recent):  Temp: 97.4 °F (36.3 °C) (01/21/25 0747)  Pulse: 82 (01/21/25 0747)  Resp: 18 (01/21/25 0747)  BP: 114/82 (01/21/25 0747)  SpO2: 97 % (01/21/25 0747) Vital Signs (24h Range):  Temp:  [97.4 °F (36.3 °C)-98.5 °F (36.9 °C)] 97.4 °F (36.3 °C)  Pulse:  [80-97] 82  Resp:  [16-20] 18  SpO2:  [88 %-97 %] 97 %  BP: (104-156)/(67-85) 114/82     Weight: 110.2 kg (243 lb)  Body mass index is 43.05 kg/m².    Intake/Output Summary (Last 24 hours) at 1/21/2025 0940  Last data filed at 1/21/2025 0915  Gross per 24 hour   Intake 1310 ml   Output 1450 ml   Net -140 ml         Physical Exam  Constitutional:       General: She is not in acute distress.     Appearance: She is obese.   Eyes:      Extraocular Movements: Extraocular movements intact.      Pupils: Pupils are equal, round, and reactive to light.   Cardiovascular:      Rate and Rhythm: Normal rate. Rhythm irregular.      Pulses: Normal pulses.      Heart sounds: Murmur heard.      Comments: AFib on telemetry  Pulmonary:      Effort: Pulmonary effort is normal. No respiratory distress.      Breath sounds: Normal breath sounds.      Comments: O2 via NC donned.  Abdominal:       General: Bowel sounds are normal. There is no distension.      Palpations: Abdomen is soft.      Tenderness: There is no abdominal tenderness.   Musculoskeletal:         General: Swelling (BUE, R>L) present. Normal range of motion.      Cervical back: Normal range of motion and neck supple.      Right lower leg: Pitting Edema (1+-2+) present.      Left lower leg: Pitting Edema (1+ to 2+) present.   Skin:     General: Skin is warm and dry.   Neurological:      Mental Status: She is alert and oriented to person, place, and time.      Motor: Weakness (Generalized) present.             Significant Labs: All pertinent labs within the past 24 hours have been reviewed.  CBC:   Recent Labs   Lab 01/20/25  0902 01/21/25  0937   WBC 8.42 8.34   HGB 7.0* 7.1*   HCT 24.5* 24.5*    271     CMP:   Recent Labs   Lab 01/20/25  0902 01/21/25  0937    135*   K 3.8 3.6    101   CO2 27 27    106   BUN 19 18   CREATININE 1.8* 1.7*   CALCIUM 8.6* 8.8   PROT 5.6* 5.6*   ALBUMIN 3.3* 3.2*   BILITOT 0.7 0.8   ALKPHOS 122 104   AST 10 11   ALT 9* 7*   ANIONGAP 8 7*     Cardiac Markers:   Recent Labs   Lab 01/20/25  0902   BNP 1,065*     Troponin:   Recent Labs   Lab 01/20/25  0902   TROPONINIHS 10.6       MRA Brain without contrast  Narrative: EXAMINATION:  MRA BRAIN WITHOUT CONTRAST    CLINICAL HISTORY:  Neuro deficit, acute, stroke suspected;    TECHNIQUE:  3D TOF MRA without contrast was performed, with maximum intensity projections reviewed.    FINDINGS:  Comparison to prior exams including MRI brain same day.  Motion artifact limits the exam.  The intracranial segments of the distal vertebral arteries and the basilar artery are widely patent.  The distal cervical and petrous segments of the internal carotid arteries are widely patent.    There is multifocal greater than 50% stenoses of the left ICA cavernous segment, with the right ICA cavernous and supraclinoid segments widely patent.  There is a  developmentally diminutive or narrowed left TABATHA A1 segment, with question of 1.8 mm aneurysm arising from the distal left TABATHA A1 segment, axial image 52 series 8.    There is fetal origin of the right posterior cerebral artery.  The bilateral anterior, middle and posterior cerebral arteries are otherwise widely patent and taper appropriately.  Impression: 1. Multifocal greater than 50% stenoses of the left ICA cavernous segment.  2. Questionable 1.8 mm aneurysm arising from the left TABATHA A1 segment.  Consider further evaluation with digital subtraction angiography.  3. Developmentally diminutive or narrowed left TABATHA A1 segment.    Electronically signed by: Morteza Houston  Date:    01/21/2025  Time:    07:31        Significant Imaging: I have reviewed all pertinent imaging results/findings within the past 24 hours.

## 2025-01-21 NOTE — ASSESSMENT & PLAN NOTE
Echocardiogram with evidence of aortic stenosis that is severe . The patient's most recent echocardiogram result is listed below. We will manage the valvular abnormality by monitor for volume overload    Echo Saline Bubble? No    Result Date: 12/8/2024    Left Ventricle: The left ventricle is normal in size. Moderately   increased wall thickness. Unable to assess wall motion. There is normal   systolic function with a visually estimated ejection fraction of 55 - 60%.   Unable to assess diastolic function due to atrial fibrillation.    Right Ventricle: Right ventricle was not well visualized due to poor   acoustic window.    Left Atrium: Left atrium is severely dilated.    Right Atrium: Right atrium is severely dilated.    Aortic Valve: There is moderate to severe stenosis. Aortic valve area   by VTI is 1.0 cm². Aortic valve peak velocity is 3.4 m/s. Mean gradient is   27.0 mmHg. The dimensionless index is 0.32. There is mild aortic   regurgitation.    IVC/SVC: Elevated venous pressure at 15 mmHg.        ===========================================  Following with Dr. Ghosh for valve workup  Monitor for volume overload  Cardiology consulted per family request

## 2025-01-21 NOTE — ASSESSMENT & PLAN NOTE
Patient has persistent (7 days or more) atrial fibrillation. Patient is currently in atrial fibrillation. [id number] Score: 3. The patients heart rate in the last 24 hours is as follows:  Pulse  Min: 80  Max: 97     Antiarrhythmics  amiodarone tablet 200 mg, Daily, Oral  metoprolol succinate (TOPROL-XL) 24 hr tablet 50 mg, 2 times daily, Oral    Anticoagulants  apixaban tablet 5 mg, 2 times daily, Oral    Plan  - Replete lytes with a goal of K>4, Mg >2  - Patient is anticoagulated, see medications listed above.  - Patient's afib is currently controlled

## 2025-01-21 NOTE — ASSESSMENT & PLAN NOTE
MRA Brain:Questionable 1.8 mm aneurysm arising from the left TABATHA A1 segment.   ===================================  Small size--> <7mm   Maintain adequate blood pressure control  Will refer to Neurosurgery for evaluation when discharged

## 2025-01-21 NOTE — ASSESSMENT & PLAN NOTE
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 CHF is currently controlled. Latest ECHO performed and demonstrates- Results for orders placed during the hospital encounter of 12/07/24    Echo Saline Bubble? No    Interpretation Summary    Left Ventricle: The left ventricle is normal in size. Moderately increased wall thickness. Unable to assess wall motion. There is normal systolic function with a visually estimated ejection fraction of 55 - 60%. Unable to assess diastolic function due to atrial fibrillation.    Right Ventricle: Right ventricle was not well visualized due to poor acoustic window.    Left Atrium: Left atrium is severely dilated.    Right Atrium: Right atrium is severely dilated.    Aortic Valve: There is moderate to severe stenosis. Aortic valve area by VTI is 1.0 cm². Aortic valve peak velocity is 3.4 m/s. Mean gradient is 27.0 mmHg. The dimensionless index is 0.32. There is mild aortic regurgitation.    IVC/SVC: Elevated venous pressure at 15 mmHg.  . Continue Beta Blocker and monitor clinical status closely. Monitor on telemetry. Patient is off CHF pathway.  Monitor strict Is&Os and daily weights.  Not on fluid restriction. Cardiology is not consulted. Continue to stress to patient importance of self efficacy and  on diet for CHF. Last BNP reviewed- and noted below   Component Ref Range & Units 13 d ago  (1/6/25)   BNP 0 - 99 pg/mL 1,065   .  ===================================  Troponin normal   Have been holding chronic diuretics due to JEFF, which has improved  Given IV Lasix x1 1/20 for increased oxygen demands  CXR unremarkable/no edema  Patient more lucid and should be able to start taking chronic diuretics

## 2025-01-21 NOTE — ASSESSMENT & PLAN NOTE
JEFF is likely due to pre-renal azotemia due to dehydration. Baseline creatinine is  1.7- 1.9 . Most recent creatinine and eGFR are listed below.  Recent Labs     01/18/25  1400 01/19/25  0541 01/20/25  0902   CREATININE 2.2* 1.9* 1.8*   EGFRNORACEVR 23.4* 27.9* 29.8*        Plan  - very mild/ JEFF is resolved--creatinine at baseline  - Avoid nephrotoxins and renally dose meds for GFR listed above  - Monitor urine output, serial BMP, and adjust therapy as needed

## 2025-01-21 NOTE — ASSESSMENT & PLAN NOTE
Appears to have resolved today-- patient now awake alert and oriented x3   Suspect multifactorial:  Polypharmacy, CKD, hypoxia   CT of head negative, cardiac enzymes negative, CUS bilateral carotid stenosis of 50-69%  MRI brain negative for anything acute  Encephalopathy labs negative so far  SLP consulted-- no swallowing deficits  PT/OT consulted--- recommend moderate intensity therapy  MRA of head > 50% stenosis of left internal carotid artery  cavernous segment. Questionable 1.8 mm aneurysm arising from the left TABATHA A1 segment.  Consider further evaluation with digital subtraction angiography. Developmentally diminutive or narrowed left TABATHA A1 segment.  EEG pending  Resume chronic medications now that patient is awake and alert

## 2025-01-21 NOTE — CARE UPDATE
01/21/25 0721   Aerosol Therapy   $ Aerosol Therapy Charges Aerosol Treatment   Daily Review of Necessity (SVN) completed   Respiratory Treatment Status (SVN) given   Treatment Route (SVN) mask;oxygen   Patient Position HOB elevated   Post Treatment Assessment (SVN) breath sounds improved   Signs of Intolerance (SVN) none   Breath Sounds Post-Respiratory Treatment   Throughout All Fields Post-Treatment All Fields   Throughout All Fields Post-Treatment aeration increased   Post-treatment Heart Rate (beats/min) 84   Post-treatment Resp Rate (breaths/min) 16

## 2025-01-21 NOTE — CONSULTS
[facility]  Wound Care    Patient Name:  Iris Mueller   MRN:  [id number]  Date: 1/20/2025  Diagnosis: Syncope    History:     Past Medical History:   Diagnosis Date    Anemia, unspecified     Arthritis     Asthma     COPD (chronic obstructive pulmonary disease)     Encounter for blood transfusion     Hypertension     Obese body habitus     Wound infection 08/2019    Right knee         Allergies as of 01/18/2025    (No Known Allergies)       WOC Assessment Details/Treatment     Wound care consulted for multiple skin tears to bilateral upper and lower extremities and moisture associated skin damage to bilateral groins, abd folds and sacral spine. Patient is pending an MRI of her right shoulder. Treatment plan for all skin tears area with Urgotul silver mesh non stick dressings but these can not be placed until patient has completed her MRI. Left supplies at bedside and educated patients nurse regarding wound care instructions. At this time placed vaseline gauze over the open skin tears and covered with cut foam pieces and secured with occlusive dressing. Wound care team to follow up as needed throughout hospital stay.        01/20/25 1403   WOCN Assessment   WOCN Total Time (mins) 60   Visit Date 01/20/25   Visit Time 1300   Consult Type New   WOCN Speciality Wound   Wound pressure;moisture;skin tear   Intervention assessed;changed;applied;chart review;coordination of care;team conference;orders   Teaching on-going        Wound 01/19/25 0017 Abrasion(s) Right Knee   Date First Assessed/Time First Assessed: 01/19/25 0017   Primary Wound Type: Abrasion(s)  Side: Right  Location: Knee   Wound Image    Dressing Appearance Dried drainage   Drainage Amount Scant   Drainage Characteristics/Odor Sanguineous   Appearance Red;Pink   Tissue loss description Partial thickness   Care Cleansed with:;Wound cleanser;Applied:;Skin Barrier   Dressing Applied;Non-adherent;Foam;Transparent film        Wound  01/19/25 0018 Abrasion(s) Left Knee   Date First Assessed/Time First Assessed: 01/19/25 0018   Present on Original Admission: Yes  Primary Wound Type: Abrasion(s)  Side: Left  Location: Knee   Wound Image    Dressing Appearance Dried drainage   Drainage Amount Scant   Drainage Characteristics/Odor Sanguineous   Appearance Red;Pink   Care Cleansed with:;Wound cleanser;Applied:   Dressing Applied;Non-adherent;Foam;Transparent film        Wound 01/19/25 0022 Skin Tear Left Arm   Date First Assessed/Time First Assessed: 01/19/25 0022   Present on Original Admission: Yes  Primary Wound Type: Skin Tear  Side: Left  Location: Arm   Wound Image    Dressing Appearance Moist drainage   Drainage Amount Scant   Drainage Characteristics/Odor Sanguineous   Appearance Red;Pink;Purple   Tissue loss description Partial thickness   Care Cleansed with:;Wound cleanser;Applied:;Skin Barrier   Dressing Applied;Non-adherent;Foam;Transparent film        Wound 01/19/25 0022 Skin Tear Right proximal Arm   Date First Assessed/Time First Assessed: 01/19/25 0022   Present on Original Admission: Yes  Primary Wound Type: Skin Tear  Side: Right  Orientation: proximal  Location: Arm   Wound Image     Dressing Appearance Dried drainage   Drainage Amount Scant   Drainage Characteristics/Odor Sanguineous   Appearance Pink;Red;Purple   Tissue loss description Partial thickness   Care Cleansed with:;Wound cleanser;Applied:   Dressing Applied;Non-adherent;Foam;Transparent film        Wound 01/19/25 0023 Skin Tear Left Arm   Date First Assessed/Time First Assessed: 01/19/25 0023   Present on Original Admission: Yes  Primary Wound Type: Skin Tear  Side: Left  Location: Arm   Wound Image    Dressing Appearance Dried drainage   Drainage Amount Scant   Drainage Characteristics/Odor Sanguineous   Appearance Red   Care Cleansed with:;Wound cleanser;Applied:;Skin Barrier   Dressing Applied;Non-adherent;Foam;Transparent film        Wound 01/19/25 0024 Skin Tear  Right distal Arm   Date First Assessed/Time First Assessed: 01/19/25 0024   Present on Original Admission: Yes  Primary Wound Type: Skin Tear  Side: Right  Orientation: distal  Location: Arm   Wound Image    Dressing Appearance Dried drainage   Drainage Amount Scant   Appearance Pink;Red   Care Cleansed with:;Wound cleanser;Applied:;Skin Barrier   Dressing Applied;Non-adherent;Foam;Transparent film        Wound 01/19/25 0025 Skin Tear Right Arm   Date First Assessed/Time First Assessed: 01/19/25 0025   Present on Original Admission: Yes  Primary Wound Type: Skin Tear  Side: Right  Location: Arm   Wound Image    Dressing Appearance Open to air   Drainage Amount None   Appearance Red   Care Cleansed with:;Wound cleanser;Applied:;Skin Barrier        Wound 01/19/25 0026 Abrasion(s) Sacral spine   Date First Assessed/Time First Assessed: 01/19/25 0026   Present on Original Admission: Yes  Primary Wound Type: Abrasion(s)  Location: Sacral spine   Wound Image     Dressing Appearance Open to air   Drainage Amount None   Appearance Red;Pink   Care Cleansed with:;Soap and water;Applied:;Skin Barrier   Dressing Applied  (Triad)        Wound 01/19/25 0028 Moisture associated dermatitis Right anterior Groin   Date First Assessed/Time First Assessed: 01/19/25 0028   Present on Original Admission: Yes  Primary Wound Type: Moisture associated dermatitis  Side: Right  Orientation: anterior  Location: Groin   Wound Image    Dressing Appearance Open to air   Drainage Amount None   Appearance Pink;Red   Care Cleansed with:;Soap and water;Applied:;Skin Barrier   Dressing Applied  (Triad)        Wound 01/19/25 0029 Moisture associated dermatitis Right lower Abdomen   Date First Assessed/Time First Assessed: 01/19/25 0029   Present on Original Admission: Yes  Primary Wound Type: Moisture associated dermatitis  Side: Right  Orientation: lower  Location: Abdomen   Wound Image    Dressing Appearance Open to air   Drainage Amount None    Appearance Red;Pink   Care Cleansed with:;Soap and water;Applied:;Skin Barrier   Dressing Applied  (Triad)        Wound 01/19/25 0029 Moisture associated dermatitis Left lower Abdomen   Date First Assessed/Time First Assessed: 01/19/25 0029   Present on Original Admission: Yes  Primary Wound Type: Moisture associated dermatitis  Side: Left  Orientation: lower  Location: Abdomen   Wound Image    Dressing Appearance Open to air   Drainage Amount None   Appearance Red   Care Cleansed with:;Soap and water;Applied:   Dressing Applied  (Triad)       Recommendations made to primary team for Triad to bilateral groins, abd folds, sacral spine every shift and leave open to air. All skin tear sites- Urgotul silver non stick mesh dressing over skin tears and cover with foam dressings and change weekly. Urgotul silver mesh dressings are not to be placed until after MRI has been completed due to interaction with silver dressings and MRI.  Orders placed.     01/20/2025

## 2025-01-21 NOTE — ASSESSMENT & PLAN NOTE
Possibly medication induced (on hydrocodone and gabapentin) vs neurologic etiology vs cardiogenic/aortic stenosis possibly contributing  Family endorses that she did not pass out, but that her legs got weak and she went down, and patient remained awake and alert during episode.  They also endorsed that this is not an unusual/new issue for her.  =================================  CT of head negative  UA negative for infection   Last TTE 12/08/2024: EF 55-60%, indeterminate diastolic function, moderate to severe AS  MRI Brain:  Negative for anything acute  MRA of head > 50% stenosis of left internal carotid artery  cavernous segment. Questionable 1.8 mm aneurysm arising from the left TABATHA A1 segment.  Consider further evaluation with digital subtraction angiography. Developmentally diminutive or narrowed left TABATHA A1 segment.   =======================================  MRA of neck:  Pending--not obtain as of yet due to patient having AMS and uncooperativeness  Orthostatics unable to be obtained due to AMS  PT/OT recommends moderate intensity   Holding hydrcodone and gabapentin  Maintain fall/syncope precautions

## 2025-01-21 NOTE — PROGRESS NOTES
Patient is unable to hold still for MRI exam, despite multiple immobilization techniques and coaching. She has trouble breathing while lying flat and is slightly altered.She was given 0.5 mg of Ativan prior to starting exam. This is the second attempt to get multiple exams done, next step will require an anesthesia consult if other exams are still needed.

## 2025-01-22 PROBLEM — R53.81 DEBILITY: Status: ACTIVE | Noted: 2025-01-22

## 2025-01-22 PROBLEM — H02.844: Status: ACTIVE | Noted: 2025-01-22

## 2025-01-22 PROBLEM — J96.01 ACUTE HYPOXEMIC RESPIRATORY FAILURE: Status: ACTIVE | Noted: 2025-01-22

## 2025-01-22 PROBLEM — H02.845: Status: ACTIVE | Noted: 2025-01-22

## 2025-01-22 PROBLEM — H02.842: Status: ACTIVE | Noted: 2025-01-22

## 2025-01-22 PROBLEM — H02.841: Status: ACTIVE | Noted: 2025-01-22

## 2025-01-22 PROBLEM — R60.0 EDEMA OF BOTH UPPER ARMS: Status: ACTIVE | Noted: 2025-01-22

## 2025-01-22 LAB
ALBUMIN SERPL BCP-MCNC: 3.2 G/DL (ref 3.5–5.2)
ALP SERPL-CCNC: 114 U/L (ref 55–135)
ALT SERPL W/O P-5'-P-CCNC: 8 U/L (ref 10–44)
ANION GAP SERPL CALC-SCNC: 10 MMOL/L (ref 8–16)
AST SERPL-CCNC: 10 U/L (ref 10–40)
BASOPHILS # BLD AUTO: 0.02 K/UL (ref 0–0.2)
BASOPHILS NFR BLD: 0.2 % (ref 0–1.9)
BILIRUB SERPL-MCNC: 1 MG/DL (ref 0.1–1)
BUN SERPL-MCNC: 18 MG/DL (ref 8–23)
CALCIUM SERPL-MCNC: 8.7 MG/DL (ref 8.7–10.5)
CHLORIDE SERPL-SCNC: 99 MMOL/L (ref 95–110)
CK SERPL-CCNC: 38 U/L (ref 20–180)
CO2 SERPL-SCNC: 24 MMOL/L (ref 23–29)
CREAT SERPL-MCNC: 1.6 MG/DL (ref 0.5–1.4)
DIFFERENTIAL METHOD BLD: ABNORMAL
EOSINOPHIL # BLD AUTO: 0 K/UL (ref 0–0.5)
EOSINOPHIL NFR BLD: 0.3 % (ref 0–8)
ERYTHROCYTE [DISTWIDTH] IN BLOOD BY AUTOMATED COUNT: 18.8 % (ref 11.5–14.5)
EST. GFR  (NO RACE VARIABLE): 34.3 ML/MIN/1.73 M^2
GLUCOSE SERPL-MCNC: 79 MG/DL (ref 70–110)
HCT VFR BLD AUTO: 27.1 % (ref 37–48.5)
HGB BLD-MCNC: 8 G/DL (ref 12–16)
IMM GRANULOCYTES # BLD AUTO: 0.08 K/UL (ref 0–0.04)
IMM GRANULOCYTES NFR BLD AUTO: 0.8 % (ref 0–0.5)
LYMPHOCYTES # BLD AUTO: 0.7 K/UL (ref 1–4.8)
LYMPHOCYTES NFR BLD: 7.3 % (ref 18–48)
MAGNESIUM SERPL-MCNC: 1.8 MG/DL (ref 1.6–2.6)
MCH RBC QN AUTO: 26.5 PG (ref 27–31)
MCHC RBC AUTO-ENTMCNC: 29.5 G/DL (ref 32–36)
MCV RBC AUTO: 90 FL (ref 82–98)
MONOCYTES # BLD AUTO: 0.9 K/UL (ref 0.3–1)
MONOCYTES NFR BLD: 9.2 % (ref 4–15)
NEUTROPHILS # BLD AUTO: 8 K/UL (ref 1.8–7.7)
NEUTROPHILS NFR BLD: 82.2 % (ref 38–73)
NRBC BLD-RTO: 0 /100 WBC
PHOSPHATE SERPL-MCNC: 3.1 MG/DL (ref 2.7–4.5)
PLATELET # BLD AUTO: 280 K/UL (ref 150–450)
PMV BLD AUTO: 9 FL (ref 9.2–12.9)
POTASSIUM SERPL-SCNC: 3.5 MMOL/L (ref 3.5–5.1)
PROT SERPL-MCNC: 5.9 G/DL (ref 6–8.4)
RBC # BLD AUTO: 3.02 M/UL (ref 4–5.4)
SODIUM SERPL-SCNC: 133 MMOL/L (ref 136–145)
WBC # BLD AUTO: 9.74 K/UL (ref 3.9–12.7)

## 2025-01-22 PROCEDURE — 85025 COMPLETE CBC W/AUTO DIFF WBC: CPT | Performed by: NURSE PRACTITIONER

## 2025-01-22 PROCEDURE — 80053 COMPREHEN METABOLIC PANEL: CPT | Performed by: NURSE PRACTITIONER

## 2025-01-22 PROCEDURE — 99900035 HC TECH TIME PER 15 MIN (STAT)

## 2025-01-22 PROCEDURE — 27000221 HC OXYGEN, UP TO 24 HOURS

## 2025-01-22 PROCEDURE — 94799 UNLISTED PULMONARY SVC/PX: CPT

## 2025-01-22 PROCEDURE — 36415 COLL VENOUS BLD VENIPUNCTURE: CPT | Performed by: NURSE PRACTITIONER

## 2025-01-22 PROCEDURE — 99900031 HC PATIENT EDUCATION (STAT)

## 2025-01-22 PROCEDURE — 82550 ASSAY OF CK (CPK): CPT | Performed by: NURSE PRACTITIONER

## 2025-01-22 PROCEDURE — 25000242 PHARM REV CODE 250 ALT 637 W/ HCPCS: Performed by: HOSPITALIST

## 2025-01-22 PROCEDURE — 25000003 PHARM REV CODE 250: Performed by: NURSE PRACTITIONER

## 2025-01-22 PROCEDURE — 94664 DEMO&/EVAL PT USE INHALER: CPT

## 2025-01-22 PROCEDURE — 63600175 PHARM REV CODE 636 W HCPCS: Mod: JZ | Performed by: NURSE PRACTITIONER

## 2025-01-22 PROCEDURE — 94640 AIRWAY INHALATION TREATMENT: CPT

## 2025-01-22 PROCEDURE — 12000002 HC ACUTE/MED SURGE SEMI-PRIVATE ROOM

## 2025-01-22 PROCEDURE — 83735 ASSAY OF MAGNESIUM: CPT | Performed by: NURSE PRACTITIONER

## 2025-01-22 PROCEDURE — 94761 N-INVAS EAR/PLS OXIMETRY MLT: CPT

## 2025-01-22 PROCEDURE — 84100 ASSAY OF PHOSPHORUS: CPT | Performed by: NURSE PRACTITIONER

## 2025-01-22 RX ORDER — TALC
6 POWDER (GRAM) TOPICAL NIGHTLY PRN
Status: DISCONTINUED | OUTPATIENT
Start: 2025-01-22 | End: 2025-01-28 | Stop reason: HOSPADM

## 2025-01-22 RX ORDER — FUROSEMIDE 10 MG/ML
40 INJECTION INTRAMUSCULAR; INTRAVENOUS ONCE
Status: COMPLETED | OUTPATIENT
Start: 2025-01-22 | End: 2025-01-22

## 2025-01-22 RX ORDER — METHYLPREDNISOLONE SOD SUCC 125 MG
125 VIAL (EA) INJECTION ONCE
Status: COMPLETED | OUTPATIENT
Start: 2025-01-22 | End: 2025-01-22

## 2025-01-22 RX ORDER — PREDNISONE 20 MG/1
40 TABLET ORAL DAILY
Status: DISCONTINUED | OUTPATIENT
Start: 2025-01-23 | End: 2025-01-25

## 2025-01-22 RX ORDER — HYDROCODONE BITARTRATE AND ACETAMINOPHEN 5; 325 MG/1; MG/1
1 TABLET ORAL EVERY 8 HOURS PRN
Status: DISCONTINUED | OUTPATIENT
Start: 2025-01-22 | End: 2025-01-22

## 2025-01-22 RX ORDER — ACETAMINOPHEN 325 MG/1
650 TABLET ORAL EVERY 6 HOURS PRN
Status: DISCONTINUED | OUTPATIENT
Start: 2025-01-22 | End: 2025-01-28 | Stop reason: HOSPADM

## 2025-01-22 RX ADMIN — FLUOXETINE HYDROCHLORIDE 40 MG: 20 CAPSULE ORAL at 08:01

## 2025-01-22 RX ADMIN — GUAIFENESIN AND DEXTROMETHORPHAN HYDROBROMIDE 2 TABLET: 600; 30 TABLET, EXTENDED RELEASE ORAL at 10:01

## 2025-01-22 RX ADMIN — HYDROCODONE BITARTRATE AND ACETAMINOPHEN 1 TABLET: 5; 325 TABLET ORAL at 08:01

## 2025-01-22 RX ADMIN — APIXABAN 5 MG: 5 TABLET, FILM COATED ORAL at 08:01

## 2025-01-22 RX ADMIN — ACETAMINOPHEN 650 MG: 325 TABLET ORAL at 08:01

## 2025-01-22 RX ADMIN — BUPROPION HYDROCHLORIDE 150 MG: 150 TABLET, FILM COATED, EXTENDED RELEASE ORAL at 08:01

## 2025-01-22 RX ADMIN — IPRATROPIUM BROMIDE 0.5 MG: 0.5 SOLUTION RESPIRATORY (INHALATION) at 07:01

## 2025-01-22 RX ADMIN — IPRATROPIUM BROMIDE 0.5 MG: 0.5 SOLUTION RESPIRATORY (INHALATION) at 01:01

## 2025-01-22 RX ADMIN — BUDESONIDE INHALATION 0.5 MG: 0.5 SUSPENSION RESPIRATORY (INHALATION) at 08:01

## 2025-01-22 RX ADMIN — METHYLPREDNISOLONE SODIUM SUCCINATE 125 MG: 125 INJECTION, POWDER, FOR SOLUTION INTRAMUSCULAR; INTRAVENOUS at 10:01

## 2025-01-22 RX ADMIN — AMIODARONE HYDROCHLORIDE 200 MG: 200 TABLET ORAL at 08:01

## 2025-01-22 RX ADMIN — MIRTAZAPINE 15 MG: 15 TABLET, FILM COATED ORAL at 08:01

## 2025-01-22 RX ADMIN — ARFORMOTEROL TARTRATE 15 MCG: 15 SOLUTION RESPIRATORY (INHALATION) at 07:01

## 2025-01-22 RX ADMIN — ACETAMINOPHEN 325 MG: 325 TABLET ORAL at 03:01

## 2025-01-22 RX ADMIN — METOPROLOL SUCCINATE 50 MG: 50 TABLET, FILM COATED, EXTENDED RELEASE ORAL at 08:01

## 2025-01-22 RX ADMIN — MUPIROCIN 1 G: 20 OINTMENT TOPICAL at 08:01

## 2025-01-22 RX ADMIN — FUROSEMIDE 40 MG: 10 INJECTION, SOLUTION INTRAMUSCULAR; INTRAVENOUS at 10:01

## 2025-01-22 RX ADMIN — Medication 150 MG: at 08:01

## 2025-01-22 RX ADMIN — ATORVASTATIN CALCIUM 10 MG: 10 TABLET, FILM COATED ORAL at 08:01

## 2025-01-22 RX ADMIN — IPRATROPIUM BROMIDE 0.5 MG: 0.5 SOLUTION RESPIRATORY (INHALATION) at 08:01

## 2025-01-22 RX ADMIN — BUDESONIDE INHALATION 0.5 MG: 0.5 SUSPENSION RESPIRATORY (INHALATION) at 07:01

## 2025-01-22 RX ADMIN — GUAIFENESIN AND DEXTROMETHORPHAN HYDROBROMIDE 2 TABLET: 600; 30 TABLET, EXTENDED RELEASE ORAL at 08:01

## 2025-01-22 NOTE — ASSESSMENT & PLAN NOTE
Echocardiogram with evidence of aortic stenosis that is severe . The patient's most recent echocardiogram result is listed below. We will manage the valvular abnormality by monitor for volume overload    Echo Saline Bubble? No    Result Date: 12/8/2024    Left Ventricle: The left ventricle is normal in size. Moderately   increased wall thickness. Unable to assess wall motion. There is normal   systolic function with a visually estimated ejection fraction of 55 - 60%.   Unable to assess diastolic function due to atrial fibrillation.    Right Ventricle: Right ventricle was not well visualized due to poor   acoustic window.    Left Atrium: Left atrium is severely dilated.    Right Atrium: Right atrium is severely dilated.    Aortic Valve: There is moderate to severe stenosis. Aortic valve area   by VTI is 1.0 cm². Aortic valve peak velocity is 3.4 m/s. Mean gradient is   27.0 mmHg. The dimensionless index is 0.32. There is mild aortic   regurgitation.    IVC/SVC: Elevated venous pressure at 15 mmHg.        ===========================================  Following with Dr. Ghosh for valve workup  Monitor for volume overload  Cardiology consulted per family request

## 2025-01-22 NOTE — CARE UPDATE
01/21/25 2007   Patient Assessment/Suction   Level of Consciousness (AVPU) alert   Respiratory Effort Shallow;Mild   Expansion/Accessory Muscles/Retractions no use of accessory muscles   All Lung Fields Breath Sounds diminished;wheezes, expiratory   Cough Frequency no cough   PRE-TX-O2   Device (Oxygen Therapy) nasal cannula   $ Is the patient on Low Flow Oxygen? Yes   Flow (L/min) (Oxygen Therapy) 5   SpO2 98 %   Pulse Oximetry Type Intermittent   $ Pulse Oximetry - Multiple Charge Pulse Oximetry - Multiple   Pulse 82   Resp 16   Positioning HOB elevated 30 degrees   Positioning   Body Position position changed independently   Head of Bed (HOB) Positioning HOB at 30 degrees   Positioning/Transfer Devices pillows;in use   Aerosol Therapy   $ Aerosol Therapy Charges Aerosol Treatment   Treatment Route (SVN) mask;oxygen   Patient Position HOB elevated   Post Treatment Assessment (SVN) breath sounds unchanged   Signs of Intolerance (SVN) none   Education   $ Education Bronchodilator;Oxygen;15 min

## 2025-01-22 NOTE — ASSESSMENT & PLAN NOTE
Appears to have resolved  Suspect multifactorial:  Polypharmacy, CKD, hypoxia   CT of head negative, cardiac enzymes negative, CUS bilateral carotid stenosis of 50-69%  MRI brain negative for anything acute  Encephalopathy labs negative so far  SLP consulted-- no swallowing deficits  PT/OT consulted--- recommend moderate intensity therapy  MRA of head > 50% stenosis of left internal carotid artery  cavernous segment. Questionable 1.8 mm aneurysm arising from the left TABATHA A1 segment.  Consider further evaluation with digital subtraction angiography. Developmentally diminutive or narrowed left TABATHA A1 segment.  EEG pending  Hydrocodone resumed per on-call provider and noted to have intermittent confusion---> discontinue hydrocodone

## 2025-01-22 NOTE — ASSESSMENT & PLAN NOTE
Patient with Hypercapnic and Hypoxic Respiratory failure which is Acute.  she is not on home oxygen. Supplemental oxygen was provided and noted- 5 L via nasal cannula   ABG  Recent Labs   Lab 01/20/25  1440   PH 7.370   PO2 20*   PCO2 50.9*   HCO3 29.4*   BE 4*   .   CXR 1/18: Cardiomegaly with no acute pulmonary process   Repeat CXR 1/20: Cardiomegaly with no acute pulmonary process   -----------------------------------  Signs/symptoms of respiratory failure include- tachypnea and increased work of breathing. Contributing diagnoses includes - CHF, COPD, and Obesity Hypoventilation Labs and images were reviewed. Patient Has recent ABG, which has been reviewed. Will treat underlying causes and adjust management of respiratory failure as follows-   ================================================  Continue supplemental O2, weaning as tolerated  Diurese today with IV Lasix x1  Give IV Solu-Medrol 1-25 mg x 1 dose today, then prednisone daily for COPD  Increase Mucinex DM 1200 mg b.i.d.  Add flutter therapy and incentive spirometry  Needs to mobilize,however, PT/OT unavailable due to current winter weather emergency.

## 2025-01-22 NOTE — ASSESSMENT & PLAN NOTE
MRA Brain:Questionable 1.8 mm aneurysm arising from the left TABATHA A1 segment.   ===================================  Nonurgent/  Small size--> <7mm   Maintain adequate blood pressure control  Will refer to Neurosurgery for evaluation when discharged

## 2025-01-22 NOTE — ASSESSMENT & PLAN NOTE
Holding chronic gabapentin due to encephalopathy  Hydrocodone resumed by on-call provider for pain  Discontinue hydrocodone since having intermittent confusion  Increase Tylenol for pain  Monitor closely

## 2025-01-22 NOTE — NURSING
Patient RUE swollen for several days, several wounds on this arm related to a fall. Patient's arm was propped up on pillows to help reduce swelling. Patient felt she had to have a BM, so with the assistance of another nurse, we turned the patient in order to place her on a bedpan. When we came later to clean patient and turn again, her RUE created a skin tear on her hand from the pressure of her holding on to the bar to help support herself with the turn, to which a large amount of clear blood tinged fluid started pouring from this skin tear. Arm was wrapped to help soak this fluid up and elevated. I sent a message to the  hospitalist in order for a follow up to be made on her RUE.  Photographs have been placed in chart and wound care was also notified.

## 2025-01-22 NOTE — CARE UPDATE
01/22/25 1330   Patient Assessment/Suction   Level of Consciousness (AVPU) alert   Respiratory Effort Unlabored   Expansion/Accessory Muscles/Retractions no use of accessory muscles   All Lung Fields Breath Sounds Anterior:;Lateral:;clear;diminished   Rhythm/Pattern, Respiratory no shortness of breath reported   Cough Frequency infrequent   Cough Type loose;nonproductive   PRE-TX-O2   Device (Oxygen Therapy) nasal cannula   $ Is the patient on Low Flow Oxygen? Yes   Flow (L/min) (Oxygen Therapy) 5   SpO2 97 %   Pulse Oximetry Type Intermittent   $ Pulse Oximetry - Multiple Charge Pulse Oximetry - Multiple   Pulse 92   Resp 16   Positioning HOB not elevated due to hypertension   Aerosol Therapy   $ Aerosol Therapy Charges Aerosol Treatment   Daily Review of Necessity (SVN) completed   Respiratory Treatment Status (SVN) given   Treatment Route (SVN) mask;oxygen   Patient Position HOB elevated   Post Treatment Assessment (SVN) wheezing decreased   Signs of Intolerance (SVN) none   Breath Sounds Post-Respiratory Treatment   Throughout All Fields Post-Treatment All Fields   Throughout All Fields Post-Treatment aeration increased   Post-treatment Heart Rate (beats/min) 4   Post-treatment Resp Rate (breaths/min) 18   Incentive Spirometer   $ Incentive Spirometer Charges done with encouragement   Incentive Spirometer Predicted Level (mL) 1550   Administration (IS) instruction provided, initial   Number of Repetitions (IS) 10   Level Incentive Spirometer (mL) 750   Patient Tolerance (IS) good;fair   Vibratory PEP Therapy   $ Vibratory PEP Charges Aerobika Therapy   $ Vibratory PEP Equipment Aerobika Equipment   $ Vibratory PEP Tech Time Charges 15 min   Daily Review of Necessity (PEP Therapy) completed   Type (PEP Therapy) vibratory/oscillatory   Route (PEP Therapy) mask;mouthpiece   Breaths per Cycle (PEP Therapy) 10   Cycles (PEP Therapy) 1   PEP Duration (min) 10   Settings (PEP Therapy) PEP 4   Patient Position (PEP  Therapy) HOB elevated   Post Treatment Assessment (PEP) breath sounds improved   Signs of Intolerance (PEP Therapy) none   Education   $ Education Bronchodilator;Oxygen;15 min

## 2025-01-22 NOTE — CARE UPDATE
01/22/25 0730   Patient Assessment/Suction   Level of Consciousness (AVPU) alert   Expansion/Accessory Muscles/Retractions no use of accessory muscles;no retractions   All Lung Fields Breath Sounds diminished   Rhythm/Pattern, Respiratory no shortness of breath reported;chest wiggle adequate;depth regular;pattern regular;unlabored   PRE-TX-O2   Device (Oxygen Therapy) nasal cannula   Flow (L/min) (Oxygen Therapy) 5   Aerosol Therapy   $ Aerosol Therapy Charges Aerosol Treatment   Daily Review of Necessity (SVN) completed   Respiratory Treatment Status (SVN) given   Treatment Route (SVN) mask;oxygen   Patient Position HOB elevated   Post Treatment Assessment (SVN) wheezing decreased   Signs of Intolerance (SVN) none   Breath Sounds Post-Respiratory Treatment   Throughout All Fields Post-Treatment All Fields   Post-treatment Heart Rate (beats/min) 88   Post-treatment Resp Rate (breaths/min) 18   Education   $ Education Bronchodilator

## 2025-01-22 NOTE — ASSESSMENT & PLAN NOTE
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 CHF is currently controlled. Latest ECHO performed and demonstrates- Results for orders placed during the hospital encounter of 12/07/24    Echo Saline Bubble? No    Interpretation Summary    Left Ventricle: The left ventricle is normal in size. Moderately increased wall thickness. Unable to assess wall motion. There is normal systolic function with a visually estimated ejection fraction of 55 - 60%. Unable to assess diastolic function due to atrial fibrillation.    Right Ventricle: Right ventricle was not well visualized due to poor acoustic window.    Left Atrium: Left atrium is severely dilated.    Right Atrium: Right atrium is severely dilated.    Aortic Valve: There is moderate to severe stenosis. Aortic valve area by VTI is 1.0 cm². Aortic valve peak velocity is 3.4 m/s. Mean gradient is 27.0 mmHg. The dimensionless index is 0.32. There is mild aortic regurgitation.    IVC/SVC: Elevated venous pressure at 15 mmHg.  . Continue Beta Blocker Aldactone Bumex  and monitor clinical status closely. Monitor on telemetry. Patient is off CHF pathway.  Monitor strict Is&Os and daily weights.  Not on fluid restriction. Cardiology is not consulted. Continue to stress to patient importance of self efficacy and  on diet for CHF. Last BNP reviewed- and noted below   Component Ref Range & Units 13 d ago  (1/6/25)   BNP 0 - 99 pg/mL 1,065   .  ===================================  Troponin normal   Have been holding chronic diuretics due to JEFF, which has improved  Given IV Lasix x1 1/20 for increased oxygen demands  CXR unremarkable/no edema  Give IV Lasix x1 today

## 2025-01-22 NOTE — ASSESSMENT & PLAN NOTE
JEFF is likely due to pre-renal azotemia due to dehydration. Baseline creatinine is  1.7- 1.9 . Most recent creatinine and eGFR are listed below.  Recent Labs     01/20/25  0902 01/21/25  0937 01/22/25  0603   CREATININE 1.8* 1.7* 1.6*   EGFRNORACEVR 29.8* 31.9* 34.3*        Plan  - very mild/ JEFF is resolved--creatinine at baseline  - Avoid nephrotoxins and renally dose meds for GFR listed above  - Monitor urine output, serial BMP, and adjust therapy as needed

## 2025-01-22 NOTE — ASSESSMENT & PLAN NOTE
Worsened while in the hospital, R>L, possible due to dependent edema or trauma from her fall  Patient anticoagulated  Obtain venous ultrasound of BUE  Continue compression to right upper extremity

## 2025-01-22 NOTE — SUBJECTIVE & OBJECTIVE
Interval History:  Sitting up in bed, awake and alert, remains oriented however is noted to have intermittent confusion.  Norco was apparently resumed by the on-call provider for pain and may be contributing to intermittent confusion.  Apparently sustained a new skin tear to right arm while turning in bed.  Still requiring supplemental O2 at 5 L via NC.  Hemoglobin 8.0 this morning after getting 1 unit of PRBC.  Renal function is stable.    Review of Systems   Constitutional:  Positive for activity change.   Respiratory:  Positive for cough and shortness of breath (Exertional).    Musculoskeletal:  Positive for arthralgias and myalgias.        Pain to right shoulder and both arms   Skin:  Positive for color change (Bruises to arms) and wound (Skin tear).   Neurological:  Positive for weakness (Generalized).   All other systems reviewed and are negative.    Objective:     Vital Signs (Most Recent):  Temp: 98.4 °F (36.9 °C) (01/22/25 0728)  Pulse: 89 (01/22/25 0728)  Resp: 19 (01/22/25 0858)  BP: 124/86 (01/22/25 0728)  SpO2: (!) 93 % (01/22/25 0728) Vital Signs (24h Range):  Temp:  [97.1 °F (36.2 °C)-99.5 °F (37.5 °C)] 98.4 °F (36.9 °C)  Pulse:  [75-92] 89  Resp:  [16-20] 19  SpO2:  [90 %-98 %] 93 %  BP: (102-124)/(70-87) 124/86     Weight: 110.2 kg (243 lb)  Body mass index is 43.05 kg/m².    Intake/Output Summary (Last 24 hours) at 1/22/2025 1056  Last data filed at 1/22/2025 0515  Gross per 24 hour   Intake 403.75 ml   Output 500 ml   Net -96.25 ml         Physical Exam  Constitutional:       General: She is not in acute distress.     Appearance: She is obese.   Eyes:      Extraocular Movements: Extraocular movements intact.      Pupils: Pupils are equal, round, and reactive to light.   Cardiovascular:      Rate and Rhythm: Normal rate. Rhythm irregular.      Pulses: Normal pulses.      Heart sounds: Murmur heard.      Comments: AFib on telemetry  Pulmonary:      Effort: Pulmonary effort is normal. No respiratory  distress.      Breath sounds: Normal breath sounds.      Comments: O2 via NC donned.  Abdominal:      General: Bowel sounds are normal. There is no distension.      Palpations: Abdomen is soft.      Tenderness: There is no abdominal tenderness.   Musculoskeletal:         General: Swelling (BUE, R>L) present. Normal range of motion.      Cervical back: Normal range of motion and neck supple.      Right lower leg: Pitting Edema (1+-2+) present.      Left lower leg: Pitting Edema (1+ to 2+) present.   Skin:     General: Skin is warm and dry.      Comments: Ace wrap to RUE   Neurological:      Mental Status: She is alert and oriented to person, place, and time.      Motor: Weakness (Generalized) present.      Comments: Intermittent confusion             Significant Labs: All pertinent labs within the past 24 hours have been reviewed.  CBC:   Recent Labs   Lab 01/21/25  0937 01/22/25  0603   WBC 8.34 9.74   HGB 7.1* 8.0*   HCT 24.5* 27.1*    280     CMP:   Recent Labs   Lab 01/21/25  0937 01/22/25  0603   * 133*   K 3.6 3.5    99   CO2 27 24    79   BUN 18 18   CREATININE 1.7* 1.6*   CALCIUM 8.8 8.7   PROT 5.6* 5.9*   ALBUMIN 3.2* 3.2*   BILITOT 0.8 1.0   ALKPHOS 104 114   AST 11 10   ALT 7* 8*   ANIONGAP 7* 10       MRA Brain without contrast  Narrative: EXAMINATION:  MRA BRAIN WITHOUT CONTRAST    CLINICAL HISTORY:  Neuro deficit, acute, stroke suspected;    TECHNIQUE:  3D TOF MRA without contrast was performed, with maximum intensity projections reviewed.    FINDINGS:  Comparison to prior exams including MRI brain same day.  Motion artifact limits the exam.  The intracranial segments of the distal vertebral arteries and the basilar artery are widely patent.  The distal cervical and petrous segments of the internal carotid arteries are widely patent.    There is multifocal greater than 50% stenoses of the left ICA cavernous segment, with the right ICA cavernous and supraclinoid segments widely  patent.  There is a developmentally diminutive or narrowed left TABATHA A1 segment, with question of 1.8 mm aneurysm arising from the distal left TABATHA A1 segment, axial image 52 series 8.    There is fetal origin of the right posterior cerebral artery.  The bilateral anterior, middle and posterior cerebral arteries are otherwise widely patent and taper appropriately.  Impression: 1. Multifocal greater than 50% stenoses of the left ICA cavernous segment.  2. Questionable 1.8 mm aneurysm arising from the left TABATHA A1 segment.  Consider further evaluation with digital subtraction angiography.  3. Developmentally diminutive or narrowed left TABATHA A1 segment.    Electronically signed by: Morteza Houston  Date:    01/21/2025  Time:    07:31        Significant Imaging: I have reviewed all pertinent imaging results/findings within the past 24 hours.
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
## 2025-01-22 NOTE — ASSESSMENT & PLAN NOTE
Patient with Acute on chronic debility due to age-related physical debility and underlying chronic illnesses . The patient's latest AMPAC (Activity Measure for Post Acute Care) Score is listed below.    AM-PAC Score - How much help does the patient need for each activity listed  Basic Mobility Total Score: 8  Turning over in bed (including adjusting bedclothes, sheets and blankets)?: A lot  Sitting down on and standing up from a chair with arms (e.g., wheelchair, bedside commode, etc.): Unable  Moving from lying on back to sitting on the side of the bed?: A lot  Moving to and from a bed to a chair (including a wheelchair)?: Unable  Need to walk in hospital room?: Unable  Climbing 3-5 steps with a railing?: Unable    Plan  - Progressive mobility protocol initated  - PT/OT consulted  - Fall precautions in place  - check CPK since had recent fall and persistent generalized weakness and myalgias  - awaiting further PT/OT recommendations-- therapies were currently unavailable due to went to whether emergency

## 2025-01-22 NOTE — ASSESSMENT & PLAN NOTE
Anemia is likely due to Iron deficiency and chronic disease due to Chronic Kidney Disease. Most recent hemoglobin and hematocrit are listed below.  Recent Labs     01/20/25  0902 01/21/25  0937 01/22/25  0603   HGB 7.0* 7.1* 8.0*   HCT 24.5* 24.5* 27.1*       Plan  - Monitor serial CBC: Daily  - Transfuse PRBC if patient becomes hemodynamically unstable, symptomatic or H/H drops below 7/21.  - Patient has received 1 units of PRBCs on 1/21  - Patient's anemia is currently improving after transfusion  - continue chronic supplementation
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
## 2025-01-22 NOTE — HOSPITAL COURSE
Ms. Mueller was admitted after having a fall at home.  Syncope was suspected since she was unable to recall the episode.  While here, her labs and vital signs were monitored and she was maintained on telemetry.  She was also found to be acutely encephalopathic and somnolent. When records were reviewed, she was noted to be last admitted to the hospital 12/2024 for AMS and a CVA workup was negative for stroke at that time.  Neurology also evaluated her at that time and her symptoms were suspected to likely be due to her chronic pain medication.      Due to her somnolence, Encephalopathy labs were done and were negative.  Her chronic hydrocodone was held.  Her carotid ultrasound showed bilateral 50-69% carotid stenosis. She had worsening hypoxia requiring increased levels of supplemental O2.  She was diuresed with a dose of IV Lasix and a CXR was done which was unremarkable.  She had an MRI of the head which was negative for anything acute, and a brain MRA which was negative for stenosis and incidentally showed a > 50% stenosis of left internal carotid artery  cavernous segment. Questionable 1.8 mm aneurysm arising from the left TABATHA A1 segment, which was a non urgent finding.  Referral for Neurosurgery was placed for this.  MRA of the neck was ordered however patient was uncooperative, despite getting IV Ativan, and it could not be done.  Vascular surgery was consulted for bilateral carotid stenosis who recommended medical management with aspirin and follow up outpatient. SLP was consulted and no swallowing deficits were noted.  PT/OT were consulted and recommended moderate intensity therapy.  Her encephalopathy started to improve.  An EEG was initially ordered since she had persistent somnolence, however, since her mental status improved, the EEG was canceled.  Mental status returned to baseline.    Family had concern about her right shoulder due to patient complaining of significant pain and limited ability of  using that arm.  An MRI of the shoulder was ordered for patient was not cooperative enough for imaging to be done.  CT shoulder was obtained which showed osteoarthritic changes involving the glenohumeral and acromioclavicular joints without evidence of fracture or dislocation.     She has a history of severe AS and Cardiology was consulted for an evaluation per the family request.  She had also presented with mild JEFF, which improved quickly. She developed swelling to her arms, with the right being greater.  This was suspected to possibly be due to either dependent edema due to not using or or trauma from the fall.  Venous ultrasound was ordered and was negative for DVT.  Compression was also applied to the arm.    She continued to require increased supplemental O2, and this was thought to be due to hypoventilatory respiratory failure, encephalopathy, underlying COPD and CHF.  ABG was done and showed hypoxia mild hypercarbia.  Her hemoglobin was noted to remain 7-7.2, and since she has a history of CHF, she was transfused 1 unit PRBC to maintain hemoglobin closer to 8. She was maintained on nebulizers.  She was given another dose of IV Lasix, and also given IV Solu-Medrol followed by oral prednisone.  Flutter therapy was added.  Repeat chest x-ray was obtained and did not demonstrate of consolidation or effusion.  Supplemental oxygen continued to be weaned.  Aggressive IS and Acapella were encouraged.  WBC was noted to increase.  Lactate was obtained and was negative.  Chest x-ray was obtained and was concerning for very mild intermittent worsening of interstitial edema versus atypical infection.  Procalcitonin very mildly elevated.  Started on doxycycline and Rocephin.  Reordered home Bumex.  Continued aggressive IS.  Remained afebrile.  WBC improved.  Patient was accepted to [facility].  Patient was complete course of oral antibiotics which were prescribed.  Decreased amiodarone 200 mg daily per Cardiology  recommendations.  Patient was follow up with PCP, Cardiology, vascular surgery.  Outpatient referral made to pulmonology, ortho, and Neurosurgery for aneurysm.  Patient was discharged to [facility].

## 2025-01-22 NOTE — CARE UPDATE
01/22/25 0700   Patient Assessment/Suction   Level of Consciousness (AVPU) alert   Respiratory Effort Unlabored   Expansion/Accessory Muscles/Retractions no use of accessory muscles   All Lung Fields Breath Sounds wheezes, expiratory   Rhythm/Pattern, Respiratory unlabored   Cough Frequency infrequent   Cough Type loose;nonproductive   PRE-TX-O2   Device (Oxygen Therapy) nasal cannula   $ Is the patient on Low Flow Oxygen? Yes   Flow (L/min) (Oxygen Therapy) 5   SpO2 95 %   Pulse Oximetry Type Intermittent   $ Pulse Oximetry - Multiple Charge Pulse Oximetry - Multiple   Pulse 75   Resp 16   Aerosol Therapy   $ Aerosol Therapy Charges Aerosol Treatment   Daily Review of Necessity (SVN) completed   Respiratory Treatment Status (SVN) given   Treatment Route (SVN) mask;oxygen   Patient Position HOB elevated   Post Treatment Assessment (SVN) wheezing decreased   Signs of Intolerance (SVN) none   Breath Sounds Post-Respiratory Treatment   Throughout All Fields Post-Treatment All Fields   Throughout All Fields Post-Treatment aeration increased   Post-treatment Heart Rate (beats/min) 84   Post-treatment Resp Rate (breaths/min) 18   Education   $ Education Bronchodilator

## 2025-01-22 NOTE — ASSESSMENT & PLAN NOTE
Patient's COPD is with exacerbation noted by worsening of baseline hypoxia currently.  Patient is currently off COPD Pathway. Continue scheduled inhalers  nebulizers and Supplemental oxygen and monitor respiratory status closely.   ====================================  See acute hypoxic/hypercarbia respiratory failure A/P

## 2025-01-23 LAB
ALBUMIN SERPL BCP-MCNC: 3.5 G/DL (ref 3.5–5.2)
ALP SERPL-CCNC: 119 U/L (ref 55–135)
ALT SERPL W/O P-5'-P-CCNC: 9 U/L (ref 10–44)
ANION GAP SERPL CALC-SCNC: 7 MMOL/L (ref 8–16)
AST SERPL-CCNC: 11 U/L (ref 10–40)
BASOPHILS # BLD AUTO: 0.01 K/UL (ref 0–0.2)
BASOPHILS NFR BLD: 0.1 % (ref 0–1.9)
BILIRUB SERPL-MCNC: 0.9 MG/DL (ref 0.1–1)
BUN SERPL-MCNC: 23 MG/DL (ref 8–23)
CALCIUM SERPL-MCNC: 9.2 MG/DL (ref 8.7–10.5)
CHLORIDE SERPL-SCNC: 101 MMOL/L (ref 95–110)
CO2 SERPL-SCNC: 30 MMOL/L (ref 23–29)
CREAT SERPL-MCNC: 1.6 MG/DL (ref 0.5–1.4)
DIFFERENTIAL METHOD BLD: ABNORMAL
EOSINOPHIL # BLD AUTO: 0 K/UL (ref 0–0.5)
EOSINOPHIL NFR BLD: 0 % (ref 0–8)
ERYTHROCYTE [DISTWIDTH] IN BLOOD BY AUTOMATED COUNT: 18.6 % (ref 11.5–14.5)
EST. GFR  (NO RACE VARIABLE): 34.3 ML/MIN/1.73 M^2
GLUCOSE SERPL-MCNC: 123 MG/DL (ref 70–110)
HCT VFR BLD AUTO: 30.5 % (ref 37–48.5)
HGB BLD-MCNC: 8.8 G/DL (ref 12–16)
IMM GRANULOCYTES # BLD AUTO: 0.05 K/UL (ref 0–0.04)
IMM GRANULOCYTES NFR BLD AUTO: 0.6 % (ref 0–0.5)
LYMPHOCYTES # BLD AUTO: 0.4 K/UL (ref 1–4.8)
LYMPHOCYTES NFR BLD: 4.2 % (ref 18–48)
MAGNESIUM SERPL-MCNC: 1.9 MG/DL (ref 1.6–2.6)
MCH RBC QN AUTO: 26.7 PG (ref 27–31)
MCHC RBC AUTO-ENTMCNC: 28.9 G/DL (ref 32–36)
MCV RBC AUTO: 92 FL (ref 82–98)
MONOCYTES # BLD AUTO: 0.6 K/UL (ref 0.3–1)
MONOCYTES NFR BLD: 6.6 % (ref 4–15)
NEUTROPHILS # BLD AUTO: 7.4 K/UL (ref 1.8–7.7)
NEUTROPHILS NFR BLD: 88.5 % (ref 38–73)
NRBC BLD-RTO: 0 /100 WBC
PHOSPHATE SERPL-MCNC: 4.2 MG/DL (ref 2.7–4.5)
PLATELET # BLD AUTO: 285 K/UL (ref 150–450)
PMV BLD AUTO: 8.7 FL (ref 9.2–12.9)
POTASSIUM SERPL-SCNC: 3.7 MMOL/L (ref 3.5–5.1)
PROT SERPL-MCNC: 6.5 G/DL (ref 6–8.4)
RBC # BLD AUTO: 3.3 M/UL (ref 4–5.4)
SODIUM SERPL-SCNC: 138 MMOL/L (ref 136–145)
VIT B6 SERPL-MCNC: 3.7 UG/L (ref 3.4–65.2)
WBC # BLD AUTO: 8.32 K/UL (ref 3.9–12.7)

## 2025-01-23 PROCEDURE — 83735 ASSAY OF MAGNESIUM: CPT | Performed by: NURSE PRACTITIONER

## 2025-01-23 PROCEDURE — 36415 COLL VENOUS BLD VENIPUNCTURE: CPT | Performed by: NURSE PRACTITIONER

## 2025-01-23 PROCEDURE — 99900035 HC TECH TIME PER 15 MIN (STAT)

## 2025-01-23 PROCEDURE — 94640 AIRWAY INHALATION TREATMENT: CPT

## 2025-01-23 PROCEDURE — 27000221 HC OXYGEN, UP TO 24 HOURS

## 2025-01-23 PROCEDURE — 25000242 PHARM REV CODE 250 ALT 637 W/ HCPCS: Performed by: HOSPITALIST

## 2025-01-23 PROCEDURE — 63600175 PHARM REV CODE 636 W HCPCS: Performed by: NURSE PRACTITIONER

## 2025-01-23 PROCEDURE — 80053 COMPREHEN METABOLIC PANEL: CPT | Performed by: NURSE PRACTITIONER

## 2025-01-23 PROCEDURE — 99223 1ST HOSP IP/OBS HIGH 75: CPT | Mod: GC,,, | Performed by: INTERNAL MEDICINE

## 2025-01-23 PROCEDURE — 25000003 PHARM REV CODE 250: Performed by: NURSE PRACTITIONER

## 2025-01-23 PROCEDURE — 94664 DEMO&/EVAL PT USE INHALER: CPT

## 2025-01-23 PROCEDURE — 85025 COMPLETE CBC W/AUTO DIFF WBC: CPT | Performed by: NURSE PRACTITIONER

## 2025-01-23 PROCEDURE — 12000002 HC ACUTE/MED SURGE SEMI-PRIVATE ROOM

## 2025-01-23 PROCEDURE — 99900031 HC PATIENT EDUCATION (STAT)

## 2025-01-23 PROCEDURE — 94761 N-INVAS EAR/PLS OXIMETRY MLT: CPT

## 2025-01-23 PROCEDURE — 94799 UNLISTED PULMONARY SVC/PX: CPT

## 2025-01-23 PROCEDURE — 84100 ASSAY OF PHOSPHORUS: CPT | Performed by: NURSE PRACTITIONER

## 2025-01-23 RX ORDER — AMIODARONE HYDROCHLORIDE 100 MG/1
100 TABLET ORAL DAILY
Status: DISCONTINUED | OUTPATIENT
Start: 2025-01-24 | End: 2025-01-28 | Stop reason: HOSPADM

## 2025-01-23 RX ORDER — METHOCARBAMOL 750 MG/1
750 TABLET, FILM COATED ORAL 3 TIMES DAILY
Status: DISCONTINUED | OUTPATIENT
Start: 2025-01-23 | End: 2025-01-28 | Stop reason: HOSPADM

## 2025-01-23 RX ADMIN — GUAIFENESIN AND DEXTROMETHORPHAN HYDROBROMIDE 2 TABLET: 600; 30 TABLET, EXTENDED RELEASE ORAL at 10:01

## 2025-01-23 RX ADMIN — MUPIROCIN 1 G: 20 OINTMENT TOPICAL at 10:01

## 2025-01-23 RX ADMIN — GUAIFENESIN AND DEXTROMETHORPHAN HYDROBROMIDE 2 TABLET: 600; 30 TABLET, EXTENDED RELEASE ORAL at 08:01

## 2025-01-23 RX ADMIN — METOPROLOL SUCCINATE 50 MG: 50 TABLET, FILM COATED, EXTENDED RELEASE ORAL at 08:01

## 2025-01-23 RX ADMIN — BUPROPION HYDROCHLORIDE 150 MG: 150 TABLET, FILM COATED, EXTENDED RELEASE ORAL at 08:01

## 2025-01-23 RX ADMIN — MUPIROCIN 1 G: 20 OINTMENT TOPICAL at 08:01

## 2025-01-23 RX ADMIN — MIRTAZAPINE 15 MG: 15 TABLET, FILM COATED ORAL at 08:01

## 2025-01-23 RX ADMIN — IPRATROPIUM BROMIDE 0.5 MG: 0.5 SOLUTION RESPIRATORY (INHALATION) at 06:01

## 2025-01-23 RX ADMIN — Medication 150 MG: at 10:01

## 2025-01-23 RX ADMIN — IPRATROPIUM BROMIDE 0.5 MG: 0.5 SOLUTION RESPIRATORY (INHALATION) at 01:01

## 2025-01-23 RX ADMIN — BUPROPION HYDROCHLORIDE 150 MG: 150 TABLET, FILM COATED, EXTENDED RELEASE ORAL at 10:01

## 2025-01-23 RX ADMIN — BUDESONIDE INHALATION 0.5 MG: 0.5 SUSPENSION RESPIRATORY (INHALATION) at 07:01

## 2025-01-23 RX ADMIN — FLUOXETINE HYDROCHLORIDE 40 MG: 20 CAPSULE ORAL at 10:01

## 2025-01-23 RX ADMIN — APIXABAN 5 MG: 5 TABLET, FILM COATED ORAL at 10:01

## 2025-01-23 RX ADMIN — METHOCARBAMOL TABLETS 750 MG: 750 TABLET, COATED ORAL at 08:01

## 2025-01-23 RX ADMIN — METHOCARBAMOL TABLETS 750 MG: 750 TABLET, COATED ORAL at 06:01

## 2025-01-23 RX ADMIN — PREDNISONE 40 MG: 20 TABLET ORAL at 10:01

## 2025-01-23 RX ADMIN — ATORVASTATIN CALCIUM 10 MG: 10 TABLET, FILM COATED ORAL at 08:01

## 2025-01-23 RX ADMIN — AMIODARONE HYDROCHLORIDE 200 MG: 200 TABLET ORAL at 10:01

## 2025-01-23 RX ADMIN — ARFORMOTEROL TARTRATE 15 MCG: 15 SOLUTION RESPIRATORY (INHALATION) at 07:01

## 2025-01-23 RX ADMIN — METOPROLOL SUCCINATE 50 MG: 50 TABLET, FILM COATED, EXTENDED RELEASE ORAL at 10:01

## 2025-01-23 RX ADMIN — IPRATROPIUM BROMIDE 0.5 MG: 0.5 SOLUTION RESPIRATORY (INHALATION) at 09:01

## 2025-01-23 RX ADMIN — ARFORMOTEROL TARTRATE 15 MCG: 15 SOLUTION RESPIRATORY (INHALATION) at 09:01

## 2025-01-23 RX ADMIN — BUDESONIDE INHALATION 0.5 MG: 0.5 SUSPENSION RESPIRATORY (INHALATION) at 09:01

## 2025-01-23 RX ADMIN — APIXABAN 5 MG: 5 TABLET, FILM COATED ORAL at 08:01

## 2025-01-23 NOTE — ASSESSMENT & PLAN NOTE
JEFF is likely due to pre-renal azotemia due to dehydration. Baseline creatinine is  1.7- 1.9 . Most recent creatinine and eGFR are listed below.  Recent Labs     01/21/25  0937 01/22/25  0603 01/23/25  0520   CREATININE 1.7* 1.6* 1.6*   EGFRNORACEVR 31.9* 34.3* 34.3*        Plan  - very mild/ JEFF is resolved--creatinine at baseline  - Avoid nephrotoxins and renally dose meds for GFR listed above  - Monitor urine output, serial BMP, and adjust therapy as needed

## 2025-01-23 NOTE — ASSESSMENT & PLAN NOTE
Patient with Hypercapnic and Hypoxic Respiratory failure which is Acute.  she is not on home oxygen. Supplemental oxygen was provided and noted- 5 L via nasal cannulaOxygen Concentration (%):  [40] 40  ABG  Recent Labs   Lab 01/20/25  1440   PH 7.370   PO2 20*   PCO2 50.9*   HCO3 29.4*   BE 4*     .   CXR 1/18: Cardiomegaly with no acute pulmonary process   Repeat CXR 1/20: Cardiomegaly with no acute pulmonary process   -----------------------------------  Signs/symptoms of respiratory failure include- tachypnea and increased work of breathing. Contributing diagnoses includes - CHF, COPD, and Obesity Hypoventilation Labs and images were reviewed. Patient Has recent ABG, which has been reviewed. Will treat underlying causes and adjust management of respiratory failure as follows-   ================================================  Continue supplemental O2, weaning as tolerated  Given dose of IV Solu-Medrol and also Diuresed  with IV Lasix 1/22  Continue prednisone daily, Mucinex DM 1200 b.i.d., incentive spirometry, flutter therapy, and pulmonary toiletry  Needs to mobilize,however, PT/OT has been unavailable due to current winter weather emergency and we will hopefully resume services today

## 2025-01-23 NOTE — CARE UPDATE
01/23/25 0653   Patient Assessment/Suction   Level of Consciousness (AVPU) alert   Respiratory Effort Normal;Unlabored   Expansion/Accessory Muscles/Retractions no use of accessory muscles   All Lung Fields Breath Sounds coarse;diminished   Rhythm/Pattern, Respiratory no shortness of breath reported   Cough Frequency infrequent   Cough Type congested;nonproductive   PRE-TX-O2   Device (Oxygen Therapy) nasal cannula with humidification   $ Is the patient on Low Flow Oxygen? Yes   Flow (L/min) (Oxygen Therapy) 5   Oxygen Concentration (%) 40   SpO2 97 %   Pulse Oximetry Type Intermittent   $ Pulse Oximetry - Multiple Charge Pulse Oximetry - Multiple   Pulse 85   Resp 20   Aerosol Therapy   $ Aerosol Therapy Charges Aerosol Treatment   Daily Review of Necessity (SVN) completed   Respiratory Treatment Status (SVN) given   Treatment Route (SVN) mask;oxygen   Patient Position HOB elevated   Post Treatment Assessment (SVN) breath sounds improved   Signs of Intolerance (SVN) none   Breath Sounds Post-Respiratory Treatment   Throughout All Fields Post-Treatment All Fields   Throughout All Fields Post-Treatment aeration increased   Post-treatment Heart Rate (beats/min) 16   Post-treatment Resp Rate (breaths/min) 16   Incentive Spirometer   $ Incentive Spirometer Charges done with encouragement   Incentive Spirometer Predicted Level (mL) 1550   Administration (IS) instruction provided, follow-up   Number of Repetitions (IS) 10   Level Incentive Spirometer (mL) 750   Patient Tolerance (IS) good;fair   Vibratory PEP Therapy   $ Vibratory PEP Charges Acapella Therapy   $ Vibratory PEP Equipment Aerobika Equipment   $ Vibratory PEP Tech Time Charges 15 min   Daily Review of Necessity (PEP Therapy) completed   Type (PEP Therapy) vibratory/oscillatory   Device (PEP Therapy) flutter   Route (PEP Therapy) mask;mouthpiece   Breaths per Cycle (PEP Therapy) 10   Cycles (PEP Therapy) 1   PEP Duration (min) 10   Settings (PEP Therapy)  PEP 4   Patient Position (PEP Therapy) HOB elevated   Post Treatment Assessment (PEP) breath sounds unchanged   Signs of Intolerance (PEP Therapy) agitation   Education   $ Education Bronchodilator;PEP Therapy;Incentive Spirometry;15 min

## 2025-01-23 NOTE — PT/OT/SLP PROGRESS
Patient not seen Tuesday, 1/21/2025 and Wednesday 1/22/2025 secondary to therapist unavailable . Will follow-up.  EDILIA Vera, BIANCA

## 2025-01-23 NOTE — ASSESSMENT & PLAN NOTE
Possibly medication induced (on hydrocodone and gabapentin) vs neurologic etiology vs cardiogenic/aortic stenosis possibly contributing  Family endorses that she did not pass out, but that her legs got weak and she went down, and patient remained awake and alert during episode.  They also endorsed that this is not an unusual/new issue for her.  =================================  CT of head negative  UA negative for infection   Last TTE 12/08/2024: EF 55-60%, indeterminate diastolic function, moderate to severe AS  MRI Brain:  Negative for anything acute  MRA of head > 50% stenosis of left internal carotid artery  cavernous segment. Questionable 1.8 mm aneurysm arising from the left TABATHA A1 segment.  Consider further evaluation with digital subtraction angiography. Developmentally diminutive or narrowed left TABATHA A1 segment.   =======================================  MRA of neck:  Pending--not obtain as of yet due to patient having AMS and uncooperativeness  Orthostatics unable to be obtained due to current condition/ we will await PT for assistance.  PT not available since 1/21  due to winter weather emergency  PT/OT recommends moderate intensity   Maintain fall/syncope precautions

## 2025-01-23 NOTE — SUBJECTIVE & OBJECTIVE
Interval History:  Lying in bed resting.  No overnight events.  Venous ultrasound negative for DVT.  Awaiting further PT/OT evaluations and recommendations.    Review of Systems   Constitutional:  Positive for activity change.   Respiratory:  Positive for cough and shortness of breath (Exertional).    Musculoskeletal:  Positive for arthralgias and myalgias.        Pain to right shoulder and both arms   Skin:  Positive for color change (Bruises to arms) and wound (Skin tear).   Neurological:  Positive for weakness (Generalized).   All other systems reviewed and are negative.    Objective:     Vital Signs (Most Recent):  Temp: 97.5 °F (36.4 °C) (01/23/25 0715)  Pulse: 83 (01/23/25 0715)  Resp: 18 (01/23/25 0715)  BP: 130/88 (01/23/25 0715)  SpO2: 99 % (01/23/25 0715) Vital Signs (24h Range):  Temp:  [96.6 °F (35.9 °C)-99.2 °F (37.3 °C)] 97.5 °F (36.4 °C)  Pulse:  [] 83  Resp:  [16-20] 18  SpO2:  [93 %-100 %] 99 %  BP: (110-161)/(66-88) 130/88     Weight: 110.2 kg (243 lb)  Body mass index is 43.05 kg/m².    Intake/Output Summary (Last 24 hours) at 1/23/2025 0819  Last data filed at 1/23/2025 0357  Gross per 24 hour   Intake 720 ml   Output 3000 ml   Net -2280 ml         Physical Exam  Constitutional:       General: She is not in acute distress.     Appearance: She is obese.   Eyes:      Extraocular Movements: Extraocular movements intact.      Pupils: Pupils are equal, round, and reactive to light.   Cardiovascular:      Rate and Rhythm: Normal rate. Rhythm irregular.      Pulses: Normal pulses.      Heart sounds: Murmur heard.      Comments: AFib on telemetry  Pulmonary:      Effort: Pulmonary effort is normal. No respiratory distress.      Breath sounds: Normal breath sounds.      Comments: O2 via NC donned.  Abdominal:      General: Bowel sounds are normal. There is no distension.      Palpations: Abdomen is soft.      Tenderness: There is no abdominal tenderness.   Musculoskeletal:         General: Swelling  (BUE, R>L) present. Normal range of motion.      Cervical back: Normal range of motion and neck supple.      Right lower leg: Pitting Edema (1+-2+) present.      Left lower leg: Pitting Edema (1+ to 2+) present.   Skin:     General: Skin is warm and dry.      Comments: Ace wrap to RUE   Neurological:      Mental Status: She is alert and oriented to person, place, and time.      Motor: Weakness (Generalized) present.      Comments: Intermittent confusion             Significant Labs: All pertinent labs within the past 24 hours have been reviewed.  CBC:   Recent Labs   Lab 01/22/25  0603 01/23/25  0520   WBC 9.74 8.32   HGB 8.0* 8.8*   HCT 27.1* 30.5*    285     CMP:   Recent Labs   Lab 01/21/25  0937 01/22/25  0603 01/23/25  0520   * 133* 138   K 3.6 3.5 3.7    99 101   CO2 27 24 30*    79 123*   BUN 18 18 23   CREATININE 1.7* 1.6* 1.6*   CALCIUM 8.8 8.7 9.2   PROT 5.6* 5.9* 6.5   ALBUMIN 3.2* 3.2* 3.5   BILITOT 0.8 1.0 0.9   ALKPHOS 104 114 119   AST 11 10 11   ALT 7* 8* 9*   ANIONGAP 7* 10 7*       US Upper Extremity Veins Bilateral  Narrative: EXAMINATION:  US UPPER EXTREMITY VEINS BILATERAL    CLINICAL HISTORY:  Edema;    TECHNIQUE:  Color and spectral Doppler of the bilateral upper extremities    COMPARISON:  None.    FINDINGS:  Grayscale, color Doppler, and spectral Doppler analysis was performed.    Bilateral internal jugular vein demonstrates normal compressibility and color and spectral Doppler flow. Bilateral subclavian vein demonstrates normal color and spectral Doppler flow.    The bilateral axillary, brachial, basilic, and cephalic veins demonstrate normal compressibility and color and spectral Doppler flow. Left radial and ulnar veins are unremarkable.  The right radial and ulnar veins are not visualized due to overlying bandage.    There is a hematoma in the mid right humerus    Moderate calcified plaque in the left carotid bifurcation with elevated velocities of 395  centimeters/second compatible with high-grade stenosis  Impression: Negative for venous thrombosis of the bilateral upper extremity.    The right radioulnar vein are not well visualized due to overlying bandage    Hematoma in the right humerus    High-grade stenosis in the proximal left internal carotid artery    Electronically signed by: Iraida Millan  Date:    01/22/2025  Time:    13:45        Significant Imaging: I have reviewed all pertinent imaging results/findings within the past 24 hours.

## 2025-01-23 NOTE — ASSESSMENT & PLAN NOTE
Patient with Acute on chronic debility due to age-related physical debility and underlying chronic illnesses . The patient's latest AMPAC (Activity Measure for Post Acute Care) Score is listed below.    AM-PAC Score - How much help does the patient need for each activity listed  Basic Mobility Total Score: 8  Turning over in bed (including adjusting bedclothes, sheets and blankets)?: A lot  Sitting down on and standing up from a chair with arms (e.g., wheelchair, bedside commode, etc.): Unable  Moving from lying on back to sitting on the side of the bed?: A lot  Moving to and from a bed to a chair (including a wheelchair)?: Unable  Need to walk in hospital room?: Unable  Climbing 3-5 steps with a railing?: Unable    Plan  - Progressive mobility protocol initated  - PT/OT consulted  - Fall precautions in place  - CPK normal  - awaiting further PT/OT recommendations-- therapies were unavailable due to winter weather emergency and we will hopefully resume today

## 2025-01-23 NOTE — PROGRESS NOTES
Critical access hospital Medicine  Progress Note    Patient Name: Iris Mueller  MRN: [id number]  Patient Class: IP- Inpatient   Admission Date: 1/18/2025  Length of Stay: 2 days  Attending Physician: Garcia Soriano MD  Primary Care Provider: Elkin You MD        Subjective     Principal Problem:Syncope        HPI:  69-year-old female with a past medical history of COPD asthma, HTN, obesity, CHF, Right knee wound infection, arthritis who presented to the emergency room for  syncope and fall.  She reports just prior to fall and she felt dizzy.  She is unaware of the events of the fall.  Patient has had frequent falls recently. She fell getting out of the shower. She is not entirely sure what happened. She is not sure why she fell. She has pain all over. Mostly complains of pain to her bilateral knees and to her chest. She says that she does not know why she fell but she was in the shower when it happened. She does not think that she slipped a she does take Norco 3 times a day and gabapentin.     In the ER, patient atrial fibrillation,t mild anemia, with H&H 8.7 and 28.1, JEFF with BUN 27 creatinine 2.2, baseline creatinine is 1.81., L dysphagia is 174, UA negative for infection CT head nonacute, right humerus x-ray nonacute, pelvic x-ray no acute fractures, right knee with diffuse soft tissue edema, chest x-ray nonacute with some cardiomegaly    Admit to hospital medicine for syncope workup consult PT/OT , JEFF    I spoke to daughter who states patient recently was seen by cardiologist who stopped 1 of her blood pressure medicines but daughter is concerned patient did not stopped taking the blood pressure medication and her blood pressure dropped    Overview/Hospital Course:  Ms. Mueller was admitted after having a fall at home.  Syncope was suspected since she was unable to recall the episode.  While here, her labs and vital signs were monitored and she was maintained on telemetry.   She was also found to be acutely encephalopathic and somnolent. When records were reviewed, she was noted to be last admitted to the hospital 12/2024 for AMS and a CVA workup was negative for stroke at that time.  Neurology also evaluated her at that time and her symptoms were suspected to likely be due to her chronic pain medication.      Due to her somnolence, Encephalopathy labs were done and were negative.  Her chronic hydrocodone was held.  Her carotid ultrasound showed bilateral 50-69% carotid stenosis. She had worsening hypoxia requiring increased levels of supplemental O2.  She was diuresed with a dose of IV Lasix and a CXR was done which was unremarkable.  She had an MRI of the head which was negative for anything acute, and a brain MRA which was negative for stenosis and incidentally showed a > 50% stenosis of left internal carotid artery  cavernous segment. Questionable 1.8 mm aneurysm arising from the left TABATHA A1 segment, which was a non urgent finding.  MRA of the neck was ordered however patient was uncooperative, despite getting IV Ativan, and it could not be done.  SLP was consulted and no swallowing deficits were noted.  PT/OT were consulted and recommended moderate intensity therapy.  Her encephalopathy started to improve.  An EEG was initially ordered since she had persistent somnolence, however, since her mental status improved, the EEG was canceled.    Family had concern about her right shoulder due to patient complaining of significant pain and limited ability of using that arm.  An MRI of the shoulder was ordered for patient was not cooperative enough for imaging to be done.  Patient's encephalopathy was slow to improve and EEG was ordered.  She has a history of severe AS and Cardiology was consulted for an evaluation per the family request.  She had also presented with mild JEFF, which improved quickly.  She developed swelling to her arms, with the right being greater.  This was suspected to  possibly be due to either dependent edema due to not using or or trauma from the fall.  Venous ultrasound was ordered and was negative for DVT.  Compression was also applied to the arm.    She continued to require increased supplemental O2, and this was thought to be due to hypoventilatory respiratory failure, encephalopathy, underlying COPD and CHF.  ABG was done and showed hypoxia mild hypercarbia.  Her hemoglobin was noted to remain 7-7.2, and since she has a history of CHF, she was transfused 1 unit PRBC to maintain hemoglobin closer to 8. She was maintained on nebulizers.  She was given another dose of IV Lasix, and also given IV Solu-Medrol followed by oral prednisone.  Flutter therapy was added.      Interval History:  Lying in bed resting.  No overnight events.  Venous ultrasound negative for DVT.  Awaiting further PT/OT evaluations and recommendations.    Review of Systems   Constitutional:  Positive for activity change.   Respiratory:  Positive for cough and shortness of breath (Exertional).    Musculoskeletal:  Positive for arthralgias and myalgias.        Pain to right shoulder and both arms   Skin:  Positive for color change (Bruises to arms) and wound (Skin tear).   Neurological:  Positive for weakness (Generalized).   All other systems reviewed and are negative.    Objective:     Vital Signs (Most Recent):  Temp: 97.5 °F (36.4 °C) (01/23/25 0715)  Pulse: 83 (01/23/25 0715)  Resp: 18 (01/23/25 0715)  BP: 130/88 (01/23/25 0715)  SpO2: 99 % (01/23/25 0715) Vital Signs (24h Range):  Temp:  [96.6 °F (35.9 °C)-99.2 °F (37.3 °C)] 97.5 °F (36.4 °C)  Pulse:  [] 83  Resp:  [16-20] 18  SpO2:  [93 %-100 %] 99 %  BP: (110-161)/(66-88) 130/88     Weight: 110.2 kg (243 lb)  Body mass index is 43.05 kg/m².    Intake/Output Summary (Last 24 hours) at 1/23/2025 0883  Last data filed at 1/23/2025 0353  Gross per 24 hour   Intake 720 ml   Output 3000 ml   Net -2280 ml         Physical Exam  Constitutional:        General: She is not in acute distress.     Appearance: She is obese.   Eyes:      Extraocular Movements: Extraocular movements intact.      Pupils: Pupils are equal, round, and reactive to light.   Cardiovascular:      Rate and Rhythm: Normal rate. Rhythm irregular.      Pulses: Normal pulses.      Heart sounds: Murmur heard.      Comments: AFib on telemetry  Pulmonary:      Effort: Pulmonary effort is normal. No respiratory distress.      Breath sounds: Normal breath sounds.      Comments: O2 via NC donned.  Abdominal:      General: Bowel sounds are normal. There is no distension.      Palpations: Abdomen is soft.      Tenderness: There is no abdominal tenderness.   Musculoskeletal:         General: Swelling (BUE, R>L) present. Normal range of motion.      Cervical back: Normal range of motion and neck supple.      Right lower leg: Pitting Edema (1+-2+) present.      Left lower leg: Pitting Edema (1+ to 2+) present.   Skin:     General: Skin is warm and dry.      Comments: Ace wrap to RUE   Neurological:      Mental Status: She is alert and oriented to person, place, and time.      Motor: Weakness (Generalized) present.      Comments: Intermittent confusion             Significant Labs: All pertinent labs within the past 24 hours have been reviewed.  CBC:   Recent Labs   Lab 01/22/25  0603 01/23/25  0520   WBC 9.74 8.32   HGB 8.0* 8.8*   HCT 27.1* 30.5*    285     CMP:   Recent Labs   Lab 01/21/25  0937 01/22/25  0603 01/23/25  0520   * 133* 138   K 3.6 3.5 3.7    99 101   CO2 27 24 30*    79 123*   BUN 18 18 23   CREATININE 1.7* 1.6* 1.6*   CALCIUM 8.8 8.7 9.2   PROT 5.6* 5.9* 6.5   ALBUMIN 3.2* 3.2* 3.5   BILITOT 0.8 1.0 0.9   ALKPHOS 104 114 119   AST 11 10 11   ALT 7* 8* 9*   ANIONGAP 7* 10 7*       US Upper Extremity Veins Bilateral  Narrative: EXAMINATION:  US UPPER EXTREMITY VEINS BILATERAL    CLINICAL HISTORY:  Edema;    TECHNIQUE:  Color and spectral Doppler of the bilateral upper  extremities    COMPARISON:  None.    FINDINGS:  Grayscale, color Doppler, and spectral Doppler analysis was performed.    Bilateral internal jugular vein demonstrates normal compressibility and color and spectral Doppler flow. Bilateral subclavian vein demonstrates normal color and spectral Doppler flow.    The bilateral axillary, brachial, basilic, and cephalic veins demonstrate normal compressibility and color and spectral Doppler flow. Left radial and ulnar veins are unremarkable.  The right radial and ulnar veins are not visualized due to overlying bandage.    There is a hematoma in the mid right humerus    Moderate calcified plaque in the left carotid bifurcation with elevated velocities of 395 centimeters/second compatible with high-grade stenosis  Impression: Negative for venous thrombosis of the bilateral upper extremity.    The right radioulnar vein are not well visualized due to overlying bandage    Hematoma in the right humerus    High-grade stenosis in the proximal left internal carotid artery    Electronically signed by: Iraida Millan  Date:    01/22/2025  Time:    13:45        Significant Imaging: I have reviewed all pertinent imaging results/findings within the past 24 hours.    Assessment and Plan     * Syncope  Possibly medication induced (on hydrocodone and gabapentin) vs neurologic etiology vs cardiogenic/aortic stenosis possibly contributing  Family endorses that she did not pass out, but that her legs got weak and she went down, and patient remained awake and alert during episode.  They also endorsed that this is not an unusual/new issue for her.  =================================  CT of head negative  UA negative for infection   Last TTE 12/08/2024: EF 55-60%, indeterminate diastolic function, moderate to severe AS  MRI Brain:  Negative for anything acute  MRA of head > 50% stenosis of left internal carotid artery  cavernous segment. Questionable 1.8 mm aneurysm arising from the left TABATHA A1  segment.  Consider further evaluation with digital subtraction angiography. Developmentally diminutive or narrowed left TABATHA A1 segment.   =======================================  MRA of neck:  Pending--not obtain as of yet due to patient having AMS and uncooperativeness  Orthostatics unable to be obtained due to current condition/ we will await PT for assistance.  PT not available since 1/21  due to winter weather emergency  PT/OT recommends moderate intensity   Maintain fall/syncope precautions    Acute hypoxemic respiratory failure  Patient with Hypercapnic and Hypoxic Respiratory failure which is Acute.  she is not on home oxygen. Supplemental oxygen was provided and noted- 5 L via nasal cannulaOxygen Concentration (%):  [40] 40  ABG  Recent Labs   Lab 01/20/25  1440   PH 7.370   PO2 20*   PCO2 50.9*   HCO3 29.4*   BE 4*     .   CXR 1/18: Cardiomegaly with no acute pulmonary process   Repeat CXR 1/20: Cardiomegaly with no acute pulmonary process   -----------------------------------  Signs/symptoms of respiratory failure include- tachypnea and increased work of breathing. Contributing diagnoses includes - CHF, COPD, and Obesity Hypoventilation Labs and images were reviewed. Patient Has recent ABG, which has been reviewed. Will treat underlying causes and adjust management of respiratory failure as follows-   ================================================  Continue supplemental O2, weaning as tolerated  Given dose of IV Solu-Medrol and also Diuresed  with IV Lasix 1/22  Continue prednisone daily, Mucinex DM 1200 b.i.d., incentive spirometry, flutter therapy, and pulmonary toiletry  Needs to mobilize,however, PT/OT has been unavailable due to current winter weather emergency and we will hopefully resume services today    Acute encephalopathy  Appears to have resolved  Suspect multifactorial:  Polypharmacy, CKD, hypoxia   CT of head negative, cardiac enzymes negative, CUS bilateral carotid stenosis of 50-69%  MRI  brain negative for anything acute  Encephalopathy labs negative so far  SLP consulted-- no swallowing deficits  PT/OT consulted--- recommend moderate intensity therapy  MRA of head > 50% stenosis of left internal carotid artery  cavernous segment. Questionable 1.8 mm aneurysm arising from the left TABATHA A1 segment.  Consider further evaluation with digital subtraction angiography. Developmentally diminutive or narrowed left TABATHA A1 segment.  EEG pending  Hydrocodone resumed 1/21 per on-call provider and noted to have intermittent confusion-and was discontinued yesterday    Acute kidney injury superimposed on stage 4 chronic kidney disease  JEFF is likely due to pre-renal azotemia due to dehydration. Baseline creatinine is  1.7- 1.9 . Most recent creatinine and eGFR are listed below.  Recent Labs     01/21/25  0937 01/22/25  0603 01/23/25  0520   CREATININE 1.7* 1.6* 1.6*   EGFRNORACEVR 31.9* 34.3* 34.3*        Plan  - very mild/ JEFF is resolved--creatinine at baseline  - Avoid nephrotoxins and renally dose meds for GFR listed above  - Monitor urine output, serial BMP, and adjust therapy as needed    Moderate aortic stenosis  Echocardiogram with evidence of aortic stenosis that is severe . The patient's most recent echocardiogram result is listed below. We will manage the valvular abnormality by monitor for volume overload    Echo Saline Bubble? No    Result Date: 12/8/2024    Left Ventricle: The left ventricle is normal in size. Moderately   increased wall thickness. Unable to assess wall motion. There is normal   systolic function with a visually estimated ejection fraction of 55 - 60%.   Unable to assess diastolic function due to atrial fibrillation.    Right Ventricle: Right ventricle was not well visualized due to poor   acoustic window.    Left Atrium: Left atrium is severely dilated.    Right Atrium: Right atrium is severely dilated.    Aortic Valve: There is moderate to severe stenosis. Aortic valve area   by VTI  is 1.0 cm². Aortic valve peak velocity is 3.4 m/s. Mean gradient is   27.0 mmHg. The dimensionless index is 0.32. There is mild aortic   regurgitation.    IVC/SVC: Elevated venous pressure at 15 mmHg.        ===========================================  Following with Dr. Ghosh for valve workup  Monitor for volume overload  Cardiology consulted per family request    Edema of both upper arms  Worsened while in the hospital, R>L, possible due to dependent edema or trauma from her fall  Patient anticoagulated  venous ultrasound of BUE-- negative for DVT  Continue elevation and compression to right upper extremity      Debility  Patient with Acute on chronic debility due to age-related physical debility and underlying chronic illnesses . The patient's latest AMPAC (Activity Measure for Post Acute Care) Score is listed below.    AM-PAC Score - How much help does the patient need for each activity listed  Basic Mobility Total Score: 8  Turning over in bed (including adjusting bedclothes, sheets and blankets)?: A lot  Sitting down on and standing up from a chair with arms (e.g., wheelchair, bedside commode, etc.): Unable  Moving from lying on back to sitting on the side of the bed?: A lot  Moving to and from a bed to a chair (including a wheelchair)?: Unable  Need to walk in hospital room?: Unable  Climbing 3-5 steps with a railing?: Unable    Plan  - Progressive mobility protocol initated  - PT/OT consulted  - Fall precautions in place  - CPK normal  - awaiting further PT/OT recommendations-- therapies were unavailable due to winter weather emergency and we will hopefully resume today        Anterior cerebral artery aneurysm  MRA Brain:Questionable 1.8 mm aneurysm arising from the left TABATHA A1 segment.   ===================================  Nonurgent/  Small size--> <7mm   Maintain adequate blood pressure control  Will refer to Neurosurgery for evaluation when discharged      Chronic pain syndrome  Holding chronic gabapentin  and hydrocodone due to encephalopathy  Hydrocodone resumed by on-call provider for pain 1/21, and patient is started to have intermittent confusion and hydrocodone was discontinued  Continue Tylenol for pain--dose increased 1/22  Monitor closely    Chronic combined systolic and diastolic heart failure  Patient is identified as having Combined Systolic and Diastolic heart failure that is Chronic. CHF is currently controlled. Latest ECHO performed and demonstrates- Results for orders placed during the hospital encounter of 12/07/24    Echo Saline Bubble? No    Interpretation Summary    Left Ventricle: The left ventricle is normal in size. Moderately increased wall thickness. Unable to assess wall motion. There is normal systolic function with a visually estimated ejection fraction of 55 - 60%. Unable to assess diastolic function due to atrial fibrillation.    Right Ventricle: Right ventricle was not well visualized due to poor acoustic window.    Left Atrium: Left atrium is severely dilated.    Right Atrium: Right atrium is severely dilated.    Aortic Valve: There is moderate to severe stenosis. Aortic valve area by VTI is 1.0 cm². Aortic valve peak velocity is 3.4 m/s. Mean gradient is 27.0 mmHg. The dimensionless index is 0.32. There is mild aortic regurgitation.    IVC/SVC: Elevated venous pressure at 15 mmHg.  . Continue Beta Blocker Aldactone Bumex  and monitor clinical status closely. Monitor on telemetry. Patient is off CHF pathway.  Monitor strict Is&Os and daily weights.  Not on fluid restriction. Cardiology is not consulted. Continue to stress to patient importance of self efficacy and  on diet for CHF. Last BNP reviewed- and noted below   Component Ref Range & Units 13 d ago  (1/6/25)   BNP 0 - 99 pg/mL 1,065   .    ===================================  Troponin normal   Have been holding chronic diuretics due to JEFF, which has improved  Given IV Lasix x1 1/20 and 1/22 for increased oxygen  demands  CXR unremarkable/no edema    Persistent atrial fibrillation  Patient has persistent (7 days or more) atrial fibrillation. Patient is currently in atrial fibrillation. [id number] Score: 3. The patients heart rate in the last 24 hours is as follows:  Pulse  Min: 81  Max: 109     Antiarrhythmics  amiodarone tablet 200 mg, Daily, Oral  metoprolol succinate (TOPROL-XL) 24 hr tablet 50 mg, 2 times daily, Oral    Anticoagulants  apixaban tablet 5 mg, 2 times daily, Oral    Plan  - Replete lytes with a goal of K>4, Mg >2  - Patient is anticoagulated, see medications listed above.  - Patient's afib is currently controlled    Chronic obstructive pulmonary disease  Patient's COPD is with exacerbation noted by worsening of baseline hypoxia currently.  Patient is currently off COPD Pathway. Continue scheduled inhalers  nebulizers and Supplemental oxygen and monitor respiratory status closely.   ====================================  See acute hypoxic/hypercarbia respiratory failure A/P    Morbid obesity  Body mass index is 43.05 kg/m². Morbid obesity complicates all aspects of disease management from diagnostic modalities to treatment. Weight loss encouraged and health benefits explained to patient.     Anemia  Anemia is likely due to Iron deficiency and chronic disease due to Chronic Kidney Disease. Most recent hemoglobin and hematocrit are listed below.  Recent Labs     01/21/25  0937 01/22/25  0603 01/23/25  0520   HGB 7.1* 8.0* 8.8*   HCT 24.5* 27.1* 30.5*     Plan  - Monitor serial CBC: Daily  - Transfuse PRBC if patient becomes hemodynamically unstable, symptomatic or H/H drops below 7/21.  - Patient has received 1 units of PRBCs on 1/21  - Patient's anemia is currently improving after transfusion  - continue chronic supplementation      VTE Risk Mitigation (From admission, onward)           Ordered     IP VTE HIGH RISK PATIENT  Once         01/19/25 1295     Reason for No Pharmacological VTE Prophylaxis  Once         Question:  Reasons:  Answer:  Patient is Ambulatory    01/19/25 1559     apixaban tablet 5 mg  2 times daily         01/18/25 1949                    Discharge Planning   VALERIANO: 1/24/2025     Code Status: Full Code   Medical Readiness for Discharge Date:   Discharge Plan A: Home Health                Please place Justification for DME        Senia Peña NP  Department of Hospital Medicine   [facility]

## 2025-01-23 NOTE — ASSESSMENT & PLAN NOTE
Holding chronic gabapentin and hydrocodone due to encephalopathy  Hydrocodone resumed by on-call provider for pain 1/21, and patient is started to have intermittent confusion and hydrocodone was discontinued  Continue Tylenol for pain--dose increased 1/22  Monitor closely

## 2025-01-23 NOTE — CONSULTS
[facility]  Department of Cardiology  Consult Note      PATIENT NAME: Iris Mueller    MRN: [id number]  TODAY'S DATE: 01/23/2025  ADMIT DATE: 1/18/2025                          CONSULT REQUESTED BY: Garcia Soriano MD    SUBJECTIVE     PRINCIPAL PROBLEM: Syncope    HPI:    Ms. Mueller is a 69 year old female who presented to the ER with complaints of fall at home. Per H&P, the patient did feel dizzy prior to fall. Family states she did not fully pass out or lose consciousness. She was sleeping during rounds today. I have spoken with her daughter, Barbara, on the phone. Per H&P, there is some concern that the patient had continued taking a medication that was recently changed.       REASON FOR CONSULT:  From Hospitalist H&P: 69-year-old female with a past medical history of COPD asthma, HTN, obesity, CHF, Right knee wound infection, arthritis who presented to the emergency room for  syncope and fall.  She reports just prior to fall and she felt dizzy.  She is unaware of the events of the fall.  Patient has had frequent falls recently. She fell getting out of the shower. She is not entirely sure what happened. She is not sure why she fell. She has pain all over. Mostly complains of pain to her bilateral knees and to her chest. She says that she does not know why she fell but she was in the shower when it happened. She does not think that she slipped a she does take Norco 3 times a day and gabapentin.      In the ER, patient atrial fibrillation,t mild anemia, with H&H 8.7 and 28.1, JEFF with BUN 27 creatinine 2.2, baseline creatinine is 1.81., L dysphagia is 174, UA negative for infection CT head nonacute, right humerus x-ray nonacute, pelvic x-ray no acute fractures, right knee with diffuse soft tissue edema, chest x-ray nonacute with some cardiomegaly     Admit to hospital medicine for syncope workup consult PT/OT , JEFF     I spoke to daughter who states patient recently was seen by  cardiologist who stopped 1 of her blood pressure medicines but daughter is concerned patient did not stopped taking the blood pressure medication and her blood pressure dropped      Review of patient's allergies indicates:  No Known Allergies    Past Medical History:   Diagnosis Date    Anemia, unspecified     Arthritis     Asthma     COPD (chronic obstructive pulmonary disease)     Encounter for blood transfusion     Hypertension     Obese body habitus     Wound infection 2019    Right knee     Past Surgical History:   Procedure Laterality Date    ANGIOGRAM, CORONARY, WITH LEFT HEART CATHETERIZATION N/A 2022    Procedure: Angiogram, Coronary, with Left Heart Cath;  Surgeon: Jorge Tran MD;  Location: [facility] CATH/EP LAB;  Service: Cardiology;  Laterality: N/A;     SECTION      ECHOCARDIOGRAM,TRANSESOPHAGEAL N/A 2023    Procedure: Transesophageal echo (MARIANO) intra-procedure log documentation;  Surgeon: Vikram, Rohan Diagnostic;  Location: [facility] EP LAB;  Service: Cardiology;  Laterality: N/A;    INCISION AND DRAINAGE OF HEMATOMA Left 2024    Procedure: INCISION AND DRAINAGE, HEMATOMA;  Surgeon: Bryson Huerta MD;  Location: [facility] OR;  Service: Orthopedics;  Laterality: Left;    JOINT REPLACEMENT      Knee    KNEE ARTHROPLASTY Right 2019    Procedure: ARTHROPLASTY, KNEE;  Surgeon: Delio Chung MD;  Location: [facility] OR;  Service: Orthopedics;  Laterality: Right;  anesthesia:  general and block    REPLACEMENT OF WOUND VACUUM-ASSISTED CLOSURE DEVICE Right 2019    Procedure: REPLACEMENT, WOUND VAC;  Surgeon: Delio Chung MD;  Location: [facility] OR;  Service: Orthopedics;  Laterality: Right;    TONSILLECTOMY      TREATMENT OF CARDIAC ARRHYTHMIA N/A 2023    Procedure: Cardioversion or Defibrillation;  Surgeon: PJ Betancourt MD;  Location: [facility] EP LAB;  Service: Cardiology;  Laterality: N/A;  AF, MARIANO, DCCV, MAC, EH, 3 Prep    VENTRICULOGRAM, LEFT  2022     Procedure: Ventriculogram, Left;  Surgeon: Jorge Tran MD;  Location: [facility] CATH/EP LAB;  Service: Cardiology;;     Social History     Tobacco Use    Smoking status: Every Day     Current packs/day: 0.25     Average packs/day: 0.5 packs/day for 50.1 years (23.8 ttl pk-yrs)     Types: Cigarettes     Start date: 1975     Passive exposure: Current    Smokeless tobacco: Never    Tobacco comments:     Pt states she has been quit now for a couple weeks, plans to stay quit.   Substance Use Topics    Alcohol use: Yes     Comment: RARELY    Drug use: Never        REVIEW OF SYSTEMS  Per HPI    OBJECTIVE     VITAL SIGNS (Most Recent)  Temp: 97.8 °F (36.6 °C) (01/23/25 1209)  Pulse: 79 (01/23/25 1209)  Resp: 18 (01/23/25 1209)  BP: 129/89 (01/23/25 1209)  SpO2: 100 % (01/23/25 1209)    VENTILATION STATUS  Resp: 18 (01/23/25 1209)  SpO2: 100 % (01/23/25 1209)  Oxygen Concentration (%):  [40] 40        I & O (Last 24H):  Intake/Output Summary (Last 24 hours) at 1/23/2025 1242  Last data filed at 1/23/2025 1107  Gross per 24 hour   Intake 720 ml   Output 3800 ml   Net -3080 ml       WEIGHTS  Wt Readings from Last 1 Encounters:   01/19/25 0011 110.2 kg (243 lb)   01/18/25 1334 110.2 kg (243 lb)       PHYSICAL EXAM  CONSTITUTIONAL: No fever, no chills  HEENT: Normocephalic, atraumatic  CVS: Afib rate controlled on telemetry   LUNGS: breathing normally while lying flat   EXTREMITIES: No cyanosis, edema  : No simmons catheter  NEURO:  sleeping on rounds       HOME MEDICATIONS:  Current Facility-Administered Medications on File Prior to Encounter   Medication Dose Route Frequency Provider Last Rate Last Admin    lidocaine (PF) 10 mg/ml (1%) injection 5 mg  0.5 mL Intradermal Once Azeem Anderson MD         Current Outpatient Medications on File Prior to Encounter   Medication Sig Dispense Refill    amiodarone (PACERONE) 200 MG Tab Take 0.5 tablets (100 mg total) by mouth once daily. (Patient taking differently: Take 200 mg by  mouth once daily.) 90 tablet 3    amLODIPine (NORVASC) 5 MG tablet Take 1 tablet (5 mg total) by mouth every evening. 90 tablet 3    apixaban (ELIQUIS) 5 mg Tab Take 1 tablet (5 mg total) by mouth 2 (two) times daily. 60 tablet 11    atorvastatin (LIPITOR) 10 MG tablet Take 1 tablet (10 mg total) by mouth every evening. 90 tablet 3    budesonide-glycopyr-formoterol (BREZTRI AEROSPHERE) 160-9-4.8 mcg/actuation HFAA Inhale 2 puffs into the lungs 2 (two) times a day.      bumetanide (BUMEX) 1 MG tablet Take 1 tablet (1 mg total) by mouth once daily. 90 tablet 4    buPROPion (WELLBUTRIN SR) 150 MG TBSR 12 hr tablet Take 1 tablet (150 mg total) by mouth 2 (two) times daily. 180 tablet 3    FLUoxetine 40 MG capsule Take 1 capsule (40 mg total) by mouth once daily. 90 capsule 3    HYDROcodone-acetaminophen (NORCO) 5-325 mg per tablet Take 1 tablet by mouth 3 (three) times daily. 90 tablet 0    iron polysaccharides (NIFEREX) 150 mg iron Cap Take 1 capsule (150 mg total) by mouth once daily. 90 capsule 0    metoprolol succinate (TOPROL-XL) 50 MG 24 hr tablet Take 1 tablet (50 mg total) by mouth 2 (two) times daily. 180 tablet 3    mirtazapine (REMERON) 15 MG tablet Take 1 tablet (15 mg total) by mouth every evening. For sleep 30 tablet 3    spironolactone (ALDACTONE) 25 MG tablet Take 1 tablet (25 mg total) by mouth once daily. 90 tablet 3    gabapentin (NEURONTIN) 300 MG capsule Take 1 capsule (300 mg total) by mouth nightly as needed (pain). (Patient not taking: Reported on 1/18/2025) 30 capsule 0    mupirocin (BACTROBAN) 2 % ointment Apply topically 2 (two) times daily. To infected skin tear (Patient not taking: Reported on 1/18/2025) 22 g 1    potassium chloride (MICRO-K) 10 MEQ CpSR Take 1 capsule (10 mEq total) by mouth once daily. Take with furosemide (Patient taking differently: Take 10 mEq by mouth once daily. Patient is taking but no longer taking Furosemide) 30 capsule 2    semaglutide, weight loss, (WEGOVY) 0.25  "mg/0.5 mL PnIj Inject 0.25 mg into the skin every 7 days. 6 mL 3    triamcinolone acetonide 0.1% (KENALOG) 0.1 % cream Apply topically 2 (two) times daily. (Patient taking differently: Apply 1 g topically 2 (two) times daily.) 60 g 2       SCHEDULED MEDS:   amiodarone  200 mg Oral Daily    apixaban  5 mg Oral BID    arformoteroL  15 mcg Nebulization BID    atorvastatin  10 mg Oral QHS    budesonide  0.5 mg Nebulization Q12H    buPROPion  150 mg Oral BID    dextromethorphan-guaiFENesin  mg  2 tablet Oral BID    FLUoxetine  40 mg Oral Daily    ipratropium  0.5 mg Nebulization Q6H WAKE    iron polysaccharides  150 mg Oral Daily    metoprolol succinate  50 mg Oral BID    mirtazapine  15 mg Oral QHS    mupirocin   Topical (Top) BID    predniSONE  40 mg Oral Daily       CONTINUOUS INFUSIONS:    PRN MEDS:  Current Facility-Administered Medications:     0.9%  NaCl infusion (for blood administration), , Intravenous, Q24H PRN    acetaminophen, 650 mg, Oral, Q6H PRN    albuterol-ipratropium, 3 mL, Nebulization, Q4H PRN    lorazepam, 0.5 mg, Intravenous, On Call Procedure    melatonin, 6 mg, Oral, Nightly PRN    sodium chloride 0.9%, 10 mL, Intravenous, PRN    LABS AND DIAGNOSTICS     CBC LAST 3 DAYS  Recent Labs   Lab 01/21/25  0937 01/22/25  0603 01/23/25  0520   WBC 8.34 9.74 8.32   RBC 2.73* 3.02* 3.30*   HGB 7.1* 8.0* 8.8*   HCT 24.5* 27.1* 30.5*   MCV 90 90 92   MCH 26.0* 26.5* 26.7*   MCHC 29.0* 29.5* 28.9*   RDW 18.8* 18.8* 18.6*    280 285   MPV 9.0* 9.0* 8.7*   GRAN 86.6*  7.2 82.2*  8.0* 88.5*  7.4   LYMPH 5.9*  0.5* 7.3*  0.7* 4.2*  0.4*   MONO 6.6  0.6 9.2  0.9 6.6  0.6   BASO 0.02 0.02 0.01   NRBC 0 0 0       COAGULATION LAST 3 DAYS  No results for input(s): "LABPT", "INR", "APTT" in the last 168 hours.    CHEMISTRY LAST 3 DAYS  Recent Labs   Lab 01/20/25  1440 01/21/25  0937 01/22/25  0603 01/23/25  0520   NA  --  135* 133* 138   K  --  3.6 3.5 3.7   CL  --  101 99 101   CO2  --  27 24 30* "   ANIONGAP  --  7* 10 7*   BUN  --  18 18 23   CREATININE  --  1.7* 1.6* 1.6*   GLU  --  106 79 123*   CALCIUM  --  8.8 8.7 9.2   PH 7.370  --   --   --    MG  --  1.8 1.8 1.9   ALBUMIN  --  3.2* 3.2* 3.5   PROT  --  5.6* 5.9* 6.5   ALKPHOS  --  104 114 119   ALT  --  7* 8* 9*   AST  --  11 10 11   BILITOT  --  0.8 1.0 0.9       CARDIAC PROFILE LAST 3 DAYS  Recent Labs   Lab 01/18/25  1400 01/19/25  0008 01/20/25  0902 01/22/25  0603   BNP  --   --  1,065*  --    CPK 37  --   --  38   TROPONINIHS  --  9.3 10.6  --        ENDOCRINE LAST 3 DAYS  Recent Labs   Lab 01/19/25  1247   TSH 1.371       LAST ARTERIAL BLOOD GAS  ABG  Recent Labs   Lab 01/20/25  1440   PH 7.370   PO2 20*   PCO2 50.9*   HCO3 29.4*   BE 4*       LAST 7 DAYS MICROBIOLOGY   Microbiology Results (last 7 days)       ** No results found for the last 168 hours. **            MOST RECENT IMAGING  US Upper Extremity Veins Bilateral  Narrative: EXAMINATION:  US UPPER EXTREMITY VEINS BILATERAL    CLINICAL HISTORY:  Edema;    TECHNIQUE:  Color and spectral Doppler of the bilateral upper extremities    COMPARISON:  None.    FINDINGS:  Grayscale, color Doppler, and spectral Doppler analysis was performed.    Bilateral internal jugular vein demonstrates normal compressibility and color and spectral Doppler flow. Bilateral subclavian vein demonstrates normal color and spectral Doppler flow.    The bilateral axillary, brachial, basilic, and cephalic veins demonstrate normal compressibility and color and spectral Doppler flow. Left radial and ulnar veins are unremarkable.  The right radial and ulnar veins are not visualized due to overlying bandage.    There is a hematoma in the mid right humerus    Moderate calcified plaque in the left carotid bifurcation with elevated velocities of 395 centimeters/second compatible with high-grade stenosis  Impression: Negative for venous thrombosis of the bilateral upper extremity.    The right radioulnar vein are not well  visualized due to overlying bandage    Hematoma in the right humerus    High-grade stenosis in the proximal left internal carotid artery    Electronically signed by: Iraida Millan  Date:    01/22/2025  Time:    13:45      ECHOCARDIOGRAM RESULTS (last 5)  Results for orders placed during the hospital encounter of 12/07/24    Echo Saline Bubble? No    Interpretation Summary    Left Ventricle: The left ventricle is normal in size. Moderately increased wall thickness. Unable to assess wall motion. There is normal systolic function with a visually estimated ejection fraction of 55 - 60%. Unable to assess diastolic function due to atrial fibrillation.    Right Ventricle: Right ventricle was not well visualized due to poor acoustic window.    Left Atrium: Left atrium is severely dilated.    Right Atrium: Right atrium is severely dilated.    Aortic Valve: There is moderate to severe stenosis. Aortic valve area by VTI is 1.0 cm². Aortic valve peak velocity is 3.4 m/s. Mean gradient is 27.0 mmHg. The dimensionless index is 0.32. There is mild aortic regurgitation.    IVC/SVC: Elevated venous pressure at 15 mmHg.      Results for orders placed during the hospital encounter of 08/31/23    Transesophageal echo (MARIANO)    Interpretation Summary    MARIANO to evaluate the LA/WENCESLAO prior to DCCV.    Left Ventricle: The left ventricle is normal in size. Normal wall thickness. Normal wall motion. There is low normal systolic function with a visually estimated ejection fraction of 50 - 55%.    Left Atrium: Left atrium is dilated. The left atrial appendage appears normal. Appendage velocity is reduced at less than 40 cm/sec. There is no thrombus in the cavity or appendage. Light spontaneous echo contrast visualized in the left atrial cavity and appendage.    Right Ventricle: Normal right ventricular cavity size. Wall thickness is normal. Right ventricle wall motion is ormal. Systolic function is normal.    Right Atrium: Right atrium is  dilated.    Aortic Valve: The aortic valve is a trileaflet valve. There is moderate aortic valve sclerosis. Mildly restricted motion. Aortic valve stenosis is present, but unable to grade severity of AS. There is mild aortic regurgitation.    Mitral Valve: Mild mitral annular calcification. There is mild regurgitation.    Tricuspid Valve: There is mild regurgitation.    Aorta: Grade 2 atherosclerosis of the descending aorta.      Results for orders placed during the hospital encounter of 12/22/22    Echo    Interpretation Summary  · The estimated ejection fraction is 40%. There is a anterior apical segment that is hypo to akinetic with a wall motion abnormality  · Grade II left ventricular diastolic dysfunction. Mean left atrial pressure 20 mm Hg. Mild mitral annular calcification  · There are segmental left ventricular wall motion abnormalities.  · Normal right ventricular size with normal right ventricular systolic function.  · Moderate left atrial enlargement.  · Moderate aortic regurgitation.  · There is moderate aortic valve stenosis.  · Aortic valve area is 1.55 cm2; peak velocity is 2.88 m/s; mean gradient is 15 mmHg.  · Moderate mitral regurgitation.  · There is mild pulmonary hypertension.  · Mild tricuspid regurgitation.  · Elevated central venous pressure (15 mmHg).  · The estimated PA systolic pressure is 47 mmHg.      Results for orders placed during the hospital encounter of 09/12/19    Echo Color Flow Doppler? Yes    Interpretation Summary  · Concentric left ventricular remodeling.  · Normal left ventricular systolic function. The estimated ejection fraction is 60%  · Normal LV diastolic function.  · Mild aortic regurgitation.  · Elevated central venous pressure (15 mm Hg).  · The estimated PA systolic pressure is 32 mm Hg      CURRENT/PREVIOUS VISIT EKG  Results for orders placed or performed during the hospital encounter of 01/18/25   EKG 12-lead    Collection Time: 01/18/25 10:45 PM   Result Value  "Ref Range    QRS Duration 110 ms    OHS QTC Calculation 470 ms    Narrative    Test Reason : I49.9,    Vent. Rate :  74 BPM     Atrial Rate :    BPM     P-R Int :    ms          QRS Dur : 110 ms      QT Int : 424 ms       P-R-T Axes :    122  -8 degrees    QTcB Int : 470 ms    Atrial fibrillation  Incomplete right bundle branch block  Possible Right ventricular hypertrophy  Septal infarct ,age undetermined  T wave abnormality, consider inferior ischemia  Abnormal ECG  No previous ECGs available  Confirmed by Oli Lara ([id number]) on 1/20/2025 11:57:10 AM    Referred By: SEKOUERRAL SELF           Confirmed By: Oli Lara           ASSESSMENT/PLAN:     Active Hospital Problems    Diagnosis    *Syncope    Acute hypoxemic respiratory failure    Debility    Edema of both upper arms    Anterior cerebral artery aneurysm    Acute encephalopathy    Chronic pain syndrome    Acute kidney injury superimposed on stage 4 chronic kidney disease    Moderate aortic stenosis    Chronic combined systolic and diastolic heart failure    Persistent atrial fibrillation    Chronic obstructive pulmonary disease    Morbid obesity    Anemia       ASSESSMENT & PLAN:     Fall due to weakness  Acute hypoxemic respiratory failure  Falls at home   Chronic anticoagulation  Moderate to severe AS  Debility   Moderate carotid artery disease  Chronic pain syndrome  Abnormal sleep patterns per daughter report       RECOMMENDATIONS:    Patient seen resting comfortably lying flat with no distress noted. BP and HR stable. She has been off amlodipine since admission and recently was taken off isosorbide. Will hold amlodipine for now.   Check orthostatic BP.  Initial diagnosis was "syncope" however family states she did not lose consciousness but rather became weak and dizzy.   Patient has had multiple falls at home per daughter's report. She is in persistent Afib on Eliquis. Discussed with patient's daughter that she is at high risk for potential for " traumatic injury or bleeding if falls continue. Alternately she is at risk for stroke if off anticoagulation.   Recommend aggressive PT and OT and to initiate strict fall precautions. Patient's daughter states she is unable to care for her mother alone at home. Ideally the patient should be discharged to SNF for strengthening.   Recent notes from Dr. Tran and Dr. Ghosh noted. Agree with the plan for monitoring and treating AS. No recommendations to address this further while inpatient.   Reduce amiodarone to 100 mg po daily. Consider MARIANO/Cardioversion at some point versus rate control. If rate control is chosen, would consider stopping amiodarone entirely.   Cardiology will be available PRN. Please reconsult for any cardiac specific needs that have not been addressed.         Jazmyne Vallejo NP  Date of Service: 01/23/2025  12:42 PM

## 2025-01-23 NOTE — ASSESSMENT & PLAN NOTE
Worsened while in the hospital, R>L, possible due to dependent edema or trauma from her fall  Patient anticoagulated  venous ultrasound of BUE-- negative for DVT  Continue elevation and compression to right upper extremity

## 2025-01-23 NOTE — ASSESSMENT & PLAN NOTE
Appears to have resolved  Suspect multifactorial:  Polypharmacy, CKD, hypoxia   CT of head negative, cardiac enzymes negative, CUS bilateral carotid stenosis of 50-69%  MRI brain negative for anything acute  Encephalopathy labs negative so far  SLP consulted-- no swallowing deficits  PT/OT consulted--- recommend moderate intensity therapy  MRA of head > 50% stenosis of left internal carotid artery  cavernous segment. Questionable 1.8 mm aneurysm arising from the left TABATHA A1 segment.  Consider further evaluation with digital subtraction angiography. Developmentally diminutive or narrowed left TABATHA A1 segment.  EEG pending  Hydrocodone resumed 1/21 per on-call provider and noted to have intermittent confusion-and was discontinued yesterday

## 2025-01-23 NOTE — ASSESSMENT & PLAN NOTE
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 CHF is currently controlled. Latest ECHO performed and demonstrates- Results for orders placed during the hospital encounter of 12/07/24    Echo Saline Bubble? No    Interpretation Summary    Left Ventricle: The left ventricle is normal in size. Moderately increased wall thickness. Unable to assess wall motion. There is normal systolic function with a visually estimated ejection fraction of 55 - 60%. Unable to assess diastolic function due to atrial fibrillation.    Right Ventricle: Right ventricle was not well visualized due to poor acoustic window.    Left Atrium: Left atrium is severely dilated.    Right Atrium: Right atrium is severely dilated.    Aortic Valve: There is moderate to severe stenosis. Aortic valve area by VTI is 1.0 cm². Aortic valve peak velocity is 3.4 m/s. Mean gradient is 27.0 mmHg. The dimensionless index is 0.32. There is mild aortic regurgitation.    IVC/SVC: Elevated venous pressure at 15 mmHg.  . Continue Beta Blocker Aldactone Bumex  and monitor clinical status closely. Monitor on telemetry. Patient is off CHF pathway.  Monitor strict Is&Os and daily weights.  Not on fluid restriction. Cardiology is not consulted. Continue to stress to patient importance of self efficacy and  on diet for CHF. Last BNP reviewed- and noted below   Component Ref Range & Units 13 d ago  (1/6/25)   BNP 0 - 99 pg/mL 1,065   .    ===================================  Troponin normal   Have been holding chronic diuretics due to JEFF, which has improved  Given IV Lasix x1 1/20 and 1/22 for increased oxygen demands  CXR unremarkable/no edema

## 2025-01-23 NOTE — ASSESSMENT & PLAN NOTE
Patient has persistent (7 days or more) atrial fibrillation. Patient is currently in atrial fibrillation. [id number] Score: 3. The patients heart rate in the last 24 hours is as follows:  Pulse  Min: 81  Max: 109     Antiarrhythmics  amiodarone tablet 200 mg, Daily, Oral  metoprolol succinate (TOPROL-XL) 24 hr tablet 50 mg, 2 times daily, Oral    Anticoagulants  apixaban tablet 5 mg, 2 times daily, Oral    Plan  - Replete lytes with a goal of K>4, Mg >2  - Patient is anticoagulated, see medications listed above.  - Patient's afib is currently controlled

## 2025-01-23 NOTE — CARE UPDATE
01/22/25 1959   Patient Assessment/Suction   Level of Consciousness (AVPU) alert   Respiratory Effort Normal;Unlabored   Expansion/Accessory Muscles/Retractions no use of accessory muscles   All Lung Fields Breath Sounds diminished;clear   Rhythm/Pattern, Respiratory shortness of breath;pattern regular   Cough Frequency no cough   PRE-TX-O2   Device (Oxygen Therapy) nasal cannula   $ Is the patient on Low Flow Oxygen? Yes   Flow (L/min) (Oxygen Therapy) 5   SpO2 96 %   Pulse Oximetry Type Intermittent   $ Pulse Oximetry - Multiple Charge Pulse Oximetry - Multiple   Pulse 99   Resp 18   Positioning Supine   Positioning   Head of Bed (HOB) Positioning HOB at 20 degrees   Positioning/Transfer Devices pillows;in use   Aerosol Therapy   $ Aerosol Therapy Charges Aerosol Treatment   Daily Review of Necessity (SVN) completed   Respiratory Treatment Status (SVN) given   Treatment Route (SVN) mask;oxygen   Patient Position HOB elevated   Post Treatment Assessment (SVN) increased aeration   Signs of Intolerance (SVN) none   Education   $ Education Bronchodilator;Oxygen;15 min

## 2025-01-23 NOTE — ASSESSMENT & PLAN NOTE
Anemia is likely due to Iron deficiency and chronic disease due to Chronic Kidney Disease. Most recent hemoglobin and hematocrit are listed below.  Recent Labs     01/21/25  0937 01/22/25  0603 01/23/25  0520   HGB 7.1* 8.0* 8.8*   HCT 24.5* 27.1* 30.5*     Plan  - Monitor serial CBC: Daily  - Transfuse PRBC if patient becomes hemodynamically unstable, symptomatic or H/H drops below 7/21.  - Patient has received 1 units of PRBCs on 1/21  - Patient's anemia is currently improving after transfusion  - continue chronic supplementation

## 2025-01-24 LAB
ALBUMIN SERPL BCP-MCNC: 3.5 G/DL (ref 3.5–5.2)
ALP SERPL-CCNC: 143 U/L (ref 55–135)
ALT SERPL W/O P-5'-P-CCNC: 11 U/L (ref 10–44)
ANION GAP SERPL CALC-SCNC: 9 MMOL/L (ref 8–16)
AST SERPL-CCNC: 18 U/L (ref 10–40)
BILIRUB SERPL-MCNC: 0.7 MG/DL (ref 0.1–1)
BUN SERPL-MCNC: 27 MG/DL (ref 8–23)
CALCIUM SERPL-MCNC: 8.9 MG/DL (ref 8.7–10.5)
CHLORIDE SERPL-SCNC: 101 MMOL/L (ref 95–110)
CO2 SERPL-SCNC: 26 MMOL/L (ref 23–29)
CREAT SERPL-MCNC: 1.6 MG/DL (ref 0.5–1.4)
EST. GFR  (NO RACE VARIABLE): 34.3 ML/MIN/1.73 M^2
GLUCOSE SERPL-MCNC: 90 MG/DL (ref 70–110)
MAGNESIUM SERPL-MCNC: 1.8 MG/DL (ref 1.6–2.6)
PHOSPHATE SERPL-MCNC: 3.1 MG/DL (ref 2.7–4.5)
POTASSIUM SERPL-SCNC: 3.5 MMOL/L (ref 3.5–5.1)
PROT SERPL-MCNC: 6.2 G/DL (ref 6–8.4)
SODIUM SERPL-SCNC: 136 MMOL/L (ref 136–145)
VIT B1 BLD-SCNC: 105.9 NMOL/L (ref 66.5–200)

## 2025-01-24 PROCEDURE — 83735 ASSAY OF MAGNESIUM: CPT | Performed by: NURSE PRACTITIONER

## 2025-01-24 PROCEDURE — 86580 TB INTRADERMAL TEST: CPT

## 2025-01-24 PROCEDURE — 94640 AIRWAY INHALATION TREATMENT: CPT

## 2025-01-24 PROCEDURE — 84100 ASSAY OF PHOSPHORUS: CPT | Performed by: NURSE PRACTITIONER

## 2025-01-24 PROCEDURE — 97530 THERAPEUTIC ACTIVITIES: CPT

## 2025-01-24 PROCEDURE — 99900031 HC PATIENT EDUCATION (STAT)

## 2025-01-24 PROCEDURE — 30200315 PPD INTRADERMAL TEST REV CODE 302

## 2025-01-24 PROCEDURE — 63600175 PHARM REV CODE 636 W HCPCS: Performed by: NURSE PRACTITIONER

## 2025-01-24 PROCEDURE — 25000242 PHARM REV CODE 250 ALT 637 W/ HCPCS: Performed by: HOSPITALIST

## 2025-01-24 PROCEDURE — 12000002 HC ACUTE/MED SURGE SEMI-PRIVATE ROOM

## 2025-01-24 PROCEDURE — 94799 UNLISTED PULMONARY SVC/PX: CPT | Mod: XB

## 2025-01-24 PROCEDURE — 94761 N-INVAS EAR/PLS OXIMETRY MLT: CPT

## 2025-01-24 PROCEDURE — 25000003 PHARM REV CODE 250: Performed by: NURSE PRACTITIONER

## 2025-01-24 PROCEDURE — 99223 1ST HOSP IP/OBS HIGH 75: CPT | Mod: ,,, | Performed by: STUDENT IN AN ORGANIZED HEALTH CARE EDUCATION/TRAINING PROGRAM

## 2025-01-24 PROCEDURE — 36415 COLL VENOUS BLD VENIPUNCTURE: CPT | Performed by: NURSE PRACTITIONER

## 2025-01-24 PROCEDURE — 27000221 HC OXYGEN, UP TO 24 HOURS

## 2025-01-24 PROCEDURE — 99900035 HC TECH TIME PER 15 MIN (STAT)

## 2025-01-24 PROCEDURE — 80053 COMPREHEN METABOLIC PANEL: CPT | Performed by: NURSE PRACTITIONER

## 2025-01-24 PROCEDURE — 97530 THERAPEUTIC ACTIVITIES: CPT | Mod: CQ

## 2025-01-24 PROCEDURE — 94664 DEMO&/EVAL PT USE INHALER: CPT

## 2025-01-24 RX ADMIN — MIRTAZAPINE 15 MG: 15 TABLET, FILM COATED ORAL at 09:01

## 2025-01-24 RX ADMIN — MUPIROCIN 1 G: 20 OINTMENT TOPICAL at 09:01

## 2025-01-24 RX ADMIN — BUDESONIDE INHALATION 0.5 MG: 0.5 SUSPENSION RESPIRATORY (INHALATION) at 08:01

## 2025-01-24 RX ADMIN — MUPIROCIN: 20 OINTMENT TOPICAL at 09:01

## 2025-01-24 RX ADMIN — AMIODARONE HYDROCHLORIDE 100 MG: 100 TABLET ORAL at 09:01

## 2025-01-24 RX ADMIN — IPRATROPIUM BROMIDE 0.5 MG: 0.5 SOLUTION RESPIRATORY (INHALATION) at 02:01

## 2025-01-24 RX ADMIN — BUPROPION HYDROCHLORIDE 150 MG: 150 TABLET, FILM COATED, EXTENDED RELEASE ORAL at 09:01

## 2025-01-24 RX ADMIN — FLUOXETINE HYDROCHLORIDE 40 MG: 20 CAPSULE ORAL at 09:01

## 2025-01-24 RX ADMIN — APIXABAN 5 MG: 5 TABLET, FILM COATED ORAL at 09:01

## 2025-01-24 RX ADMIN — PREDNISONE 40 MG: 20 TABLET ORAL at 09:01

## 2025-01-24 RX ADMIN — BUDESONIDE INHALATION 0.5 MG: 0.5 SUSPENSION RESPIRATORY (INHALATION) at 07:01

## 2025-01-24 RX ADMIN — ARFORMOTEROL TARTRATE 15 MCG: 15 SOLUTION RESPIRATORY (INHALATION) at 07:01

## 2025-01-24 RX ADMIN — ATORVASTATIN CALCIUM 10 MG: 10 TABLET, FILM COATED ORAL at 09:01

## 2025-01-24 RX ADMIN — METHOCARBAMOL TABLETS 750 MG: 750 TABLET, COATED ORAL at 09:01

## 2025-01-24 RX ADMIN — IPRATROPIUM BROMIDE 0.5 MG: 0.5 SOLUTION RESPIRATORY (INHALATION) at 07:01

## 2025-01-24 RX ADMIN — TUBERCULIN PURIFIED PROTEIN DERIVATIVE 5 UNITS: 5 INJECTION INTRADERMAL at 02:01

## 2025-01-24 RX ADMIN — METOPROLOL SUCCINATE 50 MG: 50 TABLET, FILM COATED, EXTENDED RELEASE ORAL at 09:01

## 2025-01-24 RX ADMIN — ARFORMOTEROL TARTRATE 15 MCG: 15 SOLUTION RESPIRATORY (INHALATION) at 08:01

## 2025-01-24 RX ADMIN — METHOCARBAMOL TABLETS 750 MG: 750 TABLET, COATED ORAL at 02:01

## 2025-01-24 RX ADMIN — Medication 150 MG: at 09:01

## 2025-01-24 RX ADMIN — IPRATROPIUM BROMIDE 0.5 MG: 0.5 SOLUTION RESPIRATORY (INHALATION) at 08:01

## 2025-01-24 NOTE — ASSESSMENT & PLAN NOTE
Possibly medication induced (on hydrocodone and gabapentin) vs neurologic etiology vs cardiogenic/aortic stenosis possibly contributing  Family endorses that she did not pass out, but that her legs got weak and she went down, and patient remained awake and alert during episode.  They also endorsed that this is not an unusual/new issue for her.  =================================  CT of head negative  UA negative for infection   Last TTE 12/08/2024: EF 55-60%, indeterminate diastolic function, moderate to severe AS  MRI Brain:  Negative for anything acute  MRA of head > 50% stenosis of left internal carotid artery  cavernous segment. Questionable 1.8 mm aneurysm arising from the left TABATHA A1 segment.  Consider further evaluation with digital subtraction angiography. Developmentally diminutive or narrowed left TABATHA A1 segment.   =======================================  MRA of neck:  Pending--not obtain as of yet due to patient uncooperative  Orthostatics unable to be obtained due to current condition/ we will await PT for assistance.  PT not available since 1/21  due to winter weather emergency  PT/OT recommends moderate intensity   Maintain fall/syncope precautions  Vascular surgery consulted

## 2025-01-24 NOTE — SUBJECTIVE & OBJECTIVE
Interval History:  Patient was seen and examined.  Overnight notes reviewed.  Patient was sitting up in chair on my evaluation after working with therapy.  She reports feeling weak cough, fatigue, and VALERIO.  She also reports arthralgias to right shoulder.  Unable to her obtained MRI shoulder as patient can not lay still for this evaluation.  CT shoulder ordered.  PT/OT following and recommending moderate intensive therapy.  Case management following for discharge planning.  Patient was agreeable to SNF.    Review of Systems   Constitutional:  Positive for fatigue.   Respiratory:  Positive for cough and shortness of breath (VALERIO).    Cardiovascular:  Negative for chest pain.   Gastrointestinal:  Negative for abdominal pain and nausea.   Musculoskeletal:  Positive for arthralgias.   Skin:  Positive for color change (bruising).   Neurological:  Positive for weakness (generalized).     Objective:     Vital Signs (Most Recent):  Temp: 98.3 °F (36.8 °C) (01/24/25 1158)  Pulse: 95 (01/24/25 1421)  Resp: 18 (01/24/25 1421)  BP: 130/85 (01/24/25 1158)  SpO2: 95 % (01/24/25 1421) Vital Signs (24h Range):  Temp:  [97.4 °F (36.3 °C)-99 °F (37.2 °C)] 98.3 °F (36.8 °C)  Pulse:  [80-98] 95  Resp:  [18-19] 18  SpO2:  [92 %-97 %] 95 %  BP: ()/(64-85) 130/85     Weight: 110.2 kg (243 lb)  Body mass index is 43.05 kg/m².    Intake/Output Summary (Last 24 hours) at 1/24/2025 1532  Last data filed at 1/24/2025 0505  Gross per 24 hour   Intake --   Output 750 ml   Net -750 ml         Physical Exam  Vitals and nursing note reviewed.   Constitutional:       General: She is not in acute distress.     Appearance: Normal appearance. She is obese. She is ill-appearing (chroncially).   HENT:      Head: Normocephalic and atraumatic.      Mouth/Throat:      Mouth: Mucous membranes are dry.   Eyes:      Extraocular Movements: Extraocular movements intact.   Cardiovascular:      Rate and Rhythm: Normal rate and regular rhythm.   Pulmonary:       Effort: Pulmonary effort is normal. No tachypnea or respiratory distress.      Breath sounds: Decreased breath sounds present.   Abdominal:      General: Abdomen is flat. Bowel sounds are normal.      Palpations: Abdomen is soft.      Tenderness: There is no abdominal tenderness. There is no guarding or rebound.   Musculoskeletal:      Right lower leg: No edema.      Left lower leg: No edema.      Comments: No bony tenderness to palpation of right shoulder   Neurological:      General: No focal deficit present.      Mental Status: She is alert and oriented to person, place, and time. Mental status is at baseline.   Psychiatric:         Mood and Affect: Mood normal.         Behavior: Behavior normal.             Significant Labs: All pertinent labs within the past 24 hours have been reviewed.  CBC:   Recent Labs   Lab 01/23/25  0520   WBC 8.32   HGB 8.8*   HCT 30.5*        CMP:   Recent Labs   Lab 01/23/25  0520 01/24/25  0555    136   K 3.7 3.5    101   CO2 30* 26   * 90   BUN 23 27*   CREATININE 1.6* 1.6*   CALCIUM 9.2 8.9   PROT 6.5 6.2   ALBUMIN 3.5 3.5   BILITOT 0.9 0.7   ALKPHOS 119 143*   AST 11 18   ALT 9* 11   ANIONGAP 7* 9     Magnesium:   Recent Labs   Lab 01/23/25  0520 01/24/25  0555   MG 1.9 1.8       Significant Imaging: I have reviewed all pertinent imaging results/findings within the past 24 hours.

## 2025-01-24 NOTE — PT/OT/SLP PROGRESS
"Physical Therapy Treatment    Patient Name:  Iris Mueller   MRN:  [id number]    Recommendations:     Discharge Recommendations: Moderate Intensity Therapy  Discharge Equipment Recommendations: to be determined by next level of care  Barriers to discharge: Decreased caregiver support and decreased mobility from baseline (assist x2 for safety)    Assessment:     Iris Mueller is a 69 y.o. female admitted with a medical diagnosis of Syncope.  She presents with the following impairments/functional limitations: weakness, impaired endurance, impaired functional mobility, gait instability, pain, impaired cardiopulmonary response to activity.    Co-treat performed with OT to maximize functional outcomes.    After maxA transfer to EOB, pt with notable wet cough and pain in chest, trunk, and L lower abdomen that increased with coughing. Extended time spent at EOB to acclimate to upright and for instruction in pillow splinting to improve tolerance to cough.     Pt completed stand to RW with modA of 2 persons. Completed transfer to chair via stand pivot with maxAx1 and modAx1, becoming unsteady and with declining upright posture as transfer progressed.    Pt is receptive to post-acute placement to improve strength and mobility prior to return home.     Rehab Prognosis: Good and Fair; patient would benefit from acute skilled PT services to address these deficits and reach maximum level of function.    Recent Surgery: * No surgery found *      Plan:     During this hospitalization, patient to be seen 6 x/week to address the identified rehab impairments via gait training, therapeutic activities, therapeutic exercises, neuromuscular re-education and progress toward the following goals:    Plan of Care Expires:  02/19/25    Subjective     Chief Complaint: pain in chest, trunk, and hips, with frequent and specific c/o severe pain at L lower abdomen  Patient/Family Comments/goals: "I'm not sure if I can walk out " "into the martins"  Pain/Comfort:  Pain Rating 1:  (unrated by pt, but 9/10 per Nelson Lara faces scale)  Location - Side 1: Left  Location - Orientation 1: lower  Location 1: abdomen  Pain Addressed 1: Reposition, Distraction, Nurse notified  Pain Rating Post-Intervention 1:  (was left in position of comfort in chair)      Objective:     Communicated with nurse prior to session.  Patient found HOB elevated with PureWick, peripheral IV, telemetry, oxygen, bed alarm upon PT entry to room.     General Precautions: Standard, fall  Orthopedic Precautions: N/A  Braces: N/A  Respiratory Status: Nasal cannula, flow 2 L/min     Functional Mobility:  Bed Mobility:     Supine to Sit: maxA, primarily at trunk and with c/o pain  Transfers:     Sit to Stand:  moderate assistance and of 2 persons with rolling walker  Bed to Chair: maxAx1 and modAx1 with  rolling walker  using  Stand Pivot and with declining balance and leg stability as pivot steps continued      AM-PAC 6 CLICK MOBILITY          Treatment & Education:    -Pt educ: benefits of participation with therapy, OOB to chair during the day and for all meals, staff assist for mobility, call light, fall prevention  -Transfer training  -Bed to chair transfer      Patient left up in chair with all lines intact, call button in reach, and nurse notified..    GOALS:   Multidisciplinary Problems       Physical Therapy Goals          Problem: Physical Therapy    Goal Priority Disciplines Outcome Interventions   Physical Therapy Goal     PT, PT/OT     Description: Goals to be met by: 2025     Patient will increase functional independence with mobility by performin. Supine to sit with Contact Guard Assistance  2. Sit to supine with Contact Guard Assistance  3. Sit to stand transfer with Minimal Assistance  4. Gait  x 100 feet with Minimal Assistance using Rolling Walker.                          DME Justifications:  TBD at next level of care    Time Tracking:     PT Received " On: 01/24/25  PT Start Time: 1126     PT Stop Time: 1153  PT Total Time (min): 27 min     Billable Minutes: Therapeutic Activity 27    Treatment Type: Treatment  PT/PTA: PTA     Number of PTA visits since last PT visit: 1 01/24/2025

## 2025-01-24 NOTE — PLAN OF CARE
01/23/25 2101   Patient Assessment/Suction   Level of Consciousness (AVPU) alert   Respiratory Effort Normal;Unlabored   Expansion/Accessory Muscles/Retractions no use of accessory muscles   All Lung Fields Breath Sounds coarse   Rhythm/Pattern, Respiratory depth regular;pattern regular;unlabored   PRE-TX-O2   Device (Oxygen Therapy) nasal cannula   $ Is the patient on Low Flow Oxygen? Yes   Flow (L/min) (Oxygen Therapy) 2   SpO2 95 %   Pulse Oximetry Type Intermittent   $ Pulse Oximetry - Multiple Charge Pulse Oximetry - Multiple   Pulse 92   Resp 18   Aerosol Therapy   $ Aerosol Therapy Charges Aerosol Treatment   Daily Review of Necessity (SVN) completed   Respiratory Treatment Status (SVN) given   Treatment Route (SVN) mask;oxygen   Patient Position HOB elevated   Post Treatment Assessment (SVN) breath sounds unchanged   Signs of Intolerance (SVN) none   Incentive Spirometer   $ Incentive Spirometer Charges done with encouragement   Administration (IS) instruction provided, follow-up   Number of Repetitions (IS) 10   Level Incentive Spirometer (mL) 750   Patient Tolerance (IS) good   Vibratory PEP Therapy   $ Vibratory PEP Charges Aerobika Therapy   $ Vibratory PEP Tech Time Charges 15 min   Daily Review of Necessity (PEP Therapy) completed   Type (PEP Therapy) vibratory/oscillatory   Device (PEP Therapy) flutter   Route (PEP Therapy) mouthpiece   Breaths per Cycle (PEP Therapy) 10   Cycles (PEP Therapy) 1   Settings (PEP Therapy) PEP 4   Patient Position (PEP Therapy) HOB elevated   Post Treatment Assessment (PEP) breath sounds unchanged   Signs of Intolerance (PEP Therapy) none   Education   $ Education Bronchodilator;15 min

## 2025-01-24 NOTE — PROGRESS NOTES
[facility] Medicine  Progress Note    Patient Name: Iris Mueller  MRN: [id number]  Patient Class: IP- Inpatient   Admission Date: 1/18/2025  Length of Stay: 3 days  Attending Physician: Garcia Soriano MD  Primary Care Provider: Elkin You MD        Subjective     Principal Problem:Syncope        HPI:  69-year-old female with a past medical history of COPD asthma, HTN, obesity, CHF, Right knee wound infection, arthritis who presented to the emergency room for  syncope and fall.  She reports just prior to fall and she felt dizzy.  She is unaware of the events of the fall.  Patient has had frequent falls recently. She fell getting out of the shower. She is not entirely sure what happened. She is not sure why she fell. She has pain all over. Mostly complains of pain to her bilateral knees and to her chest. She says that she does not know why she fell but she was in the shower when it happened. She does not think that she slipped a she does take Norco 3 times a day and gabapentin.     In the ER, patient atrial fibrillation,t mild anemia, with H&H 8.7 and 28.1, JEFF with BUN 27 creatinine 2.2, baseline creatinine is 1.81., L dysphagia is 174, UA negative for infection CT head nonacute, right humerus x-ray nonacute, pelvic x-ray no acute fractures, right knee with diffuse soft tissue edema, chest x-ray nonacute with some cardiomegaly    Admit to hospital medicine for syncope workup consult PT/OT , JEFF    I spoke to daughter who states patient recently was seen by cardiologist who stopped 1 of her blood pressure medicines but daughter is concerned patient did not stopped taking the blood pressure medication and her blood pressure dropped    Overview/Hospital Course:  Ms. Mueller was admitted after having a fall at home.  Syncope was suspected since she was unable to recall the episode.  While here, her labs and vital signs were monitored and she was maintained on telemetry.   She was also found to be acutely encephalopathic and somnolent. When records were reviewed, she was noted to be last admitted to the hospital 12/2024 for AMS and a CVA workup was negative for stroke at that time.  Neurology also evaluated her at that time and her symptoms were suspected to likely be due to her chronic pain medication.      Due to her somnolence, Encephalopathy labs were done and were negative.  Her chronic hydrocodone was held.  Her carotid ultrasound showed bilateral 50-69% carotid stenosis. She had worsening hypoxia requiring increased levels of supplemental O2.  She was diuresed with a dose of IV Lasix and a CXR was done which was unremarkable.  She had an MRI of the head which was negative for anything acute, and a brain MRA which was negative for stenosis and incidentally showed a > 50% stenosis of left internal carotid artery  cavernous segment. Questionable 1.8 mm aneurysm arising from the left TABATHA A1 segment, which was a non urgent finding.  MRA of the neck was ordered however patient was uncooperative, despite getting IV Ativan, and it could not be done.  SLP was consulted and no swallowing deficits were noted.  PT/OT were consulted and recommended moderate intensity therapy.  Her encephalopathy started to improve.  An EEG was initially ordered since she had persistent somnolence, however, since her mental status improved, the EEG was canceled.    Family had concern about her right shoulder due to patient complaining of significant pain and limited ability of using that arm.  An MRI of the shoulder was ordered for patient was not cooperative enough for imaging to be done.  Patient's encephalopathy was slow to improve and EEG was ordered.  She has a history of severe AS and Cardiology was consulted for an evaluation per the family request.  She had also presented with mild JEFF, which improved quickly.  She developed swelling to her arms, with the right being greater.  This was suspected to  possibly be due to either dependent edema due to not using or or trauma from the fall.  Venous ultrasound was ordered and was negative for DVT.  Compression was also applied to the arm.    She continued to require increased supplemental O2, and this was thought to be due to hypoventilatory respiratory failure, encephalopathy, underlying COPD and CHF.  ABG was done and showed hypoxia mild hypercarbia.  Her hemoglobin was noted to remain 7-7.2, and since she has a history of CHF, she was transfused 1 unit PRBC to maintain hemoglobin closer to 8. She was maintained on nebulizers.  She was given another dose of IV Lasix, and also given IV Solu-Medrol followed by oral prednisone.  Flutter therapy was added.      Interval History:  Patient was seen and examined.  Overnight notes reviewed.  Patient was sitting up in chair on my evaluation after working with therapy.  She reports feeling weak cough, fatigue, and VALERIO.  She also reports arthralgias to right shoulder.  Unable to her obtained MRI shoulder as patient can not lay still for this evaluation.  CT shoulder ordered.  PT/OT following and recommending moderate intensive therapy.  Case management following for discharge planning.  Patient was agreeable to SNF.    Review of Systems   Constitutional:  Positive for fatigue.   Respiratory:  Positive for cough and shortness of breath (VALERIO).    Cardiovascular:  Negative for chest pain.   Gastrointestinal:  Negative for abdominal pain and nausea.   Musculoskeletal:  Positive for arthralgias.   Skin:  Positive for color change (bruising).   Neurological:  Positive for weakness (generalized).     Objective:     Vital Signs (Most Recent):  Temp: 98.3 °F (36.8 °C) (01/24/25 1158)  Pulse: 95 (01/24/25 1421)  Resp: 18 (01/24/25 1421)  BP: 130/85 (01/24/25 1158)  SpO2: 95 % (01/24/25 1421) Vital Signs (24h Range):  Temp:  [97.4 °F (36.3 °C)-99 °F (37.2 °C)] 98.3 °F (36.8 °C)  Pulse:  [80-98] 95  Resp:  [18-19] 18  SpO2:  [92 %-97 %]  95 %  BP: ()/(64-85) 130/85     Weight: 110.2 kg (243 lb)  Body mass index is 43.05 kg/m².    Intake/Output Summary (Last 24 hours) at 1/24/2025 1532  Last data filed at 1/24/2025 0505  Gross per 24 hour   Intake --   Output 750 ml   Net -750 ml         Physical Exam  Vitals and nursing note reviewed.   Constitutional:       General: She is not in acute distress.     Appearance: Normal appearance. She is obese. She is ill-appearing (chroncially).   HENT:      Head: Normocephalic and atraumatic.      Mouth/Throat:      Mouth: Mucous membranes are dry.   Eyes:      Extraocular Movements: Extraocular movements intact.   Cardiovascular:      Rate and Rhythm: Normal rate and regular rhythm.   Pulmonary:      Effort: Pulmonary effort is normal. No tachypnea or respiratory distress.      Breath sounds: Decreased breath sounds present.   Abdominal:      General: Abdomen is flat. Bowel sounds are normal.      Palpations: Abdomen is soft.      Tenderness: There is no abdominal tenderness. There is no guarding or rebound.   Musculoskeletal:      Right lower leg: No edema.      Left lower leg: No edema.      Comments: No bony tenderness to palpation of right shoulder   Neurological:      General: No focal deficit present.      Mental Status: She is alert and oriented to person, place, and time. Mental status is at baseline.   Psychiatric:         Mood and Affect: Mood normal.         Behavior: Behavior normal.             Significant Labs: All pertinent labs within the past 24 hours have been reviewed.  CBC:   Recent Labs   Lab 01/23/25  0520   WBC 8.32   HGB 8.8*   HCT 30.5*        CMP:   Recent Labs   Lab 01/23/25  0520 01/24/25  0555    136   K 3.7 3.5    101   CO2 30* 26   * 90   BUN 23 27*   CREATININE 1.6* 1.6*   CALCIUM 9.2 8.9   PROT 6.5 6.2   ALBUMIN 3.5 3.5   BILITOT 0.9 0.7   ALKPHOS 119 143*   AST 11 18   ALT 9* 11   ANIONGAP 7* 9     Magnesium:   Recent Labs   Lab 01/23/25  0520  01/24/25  0555   MG 1.9 1.8       Significant Imaging: I have reviewed all pertinent imaging results/findings within the past 24 hours.    Assessment and Plan     * Syncope  Possibly medication induced (on hydrocodone and gabapentin) vs neurologic etiology vs cardiogenic/aortic stenosis possibly contributing  Family endorses that she did not pass out, but that her legs got weak and she went down, and patient remained awake and alert during episode.  They also endorsed that this is not an unusual/new issue for her.  =================================  CT of head negative  UA negative for infection   Last TTE 12/08/2024: EF 55-60%, indeterminate diastolic function, moderate to severe AS  MRI Brain:  Negative for anything acute  MRA of head > 50% stenosis of left internal carotid artery  cavernous segment. Questionable 1.8 mm aneurysm arising from the left TABATHA A1 segment.  Consider further evaluation with digital subtraction angiography. Developmentally diminutive or narrowed left TABATHA A1 segment.   =======================================  MRA of neck:  Pending--not obtain as of yet due to patient uncooperative  Orthostatics unable to be obtained due to current condition/ we will await PT for assistance.  PT not available since 1/21  due to winter weather emergency  PT/OT recommends moderate intensity   Maintain fall/syncope precautions  Vascular surgery consulted     Edema of both upper arms  Worsened while in the hospital, R>L, possible due to dependent edema or trauma from her fall  Patient anticoagulated  venous ultrasound of BUE-- negative for DVT  Continue elevation and compression to right upper extremity      Debility  Patient with Acute on chronic debility due to age-related physical debility and underlying chronic illnesses . The patient's latest AMPAC (Activity Measure for Post Acute Care) Score is listed below.    AM-PAC Score - How much help does the patient need for each activity listed  Basic Mobility  Total Score: 8  Turning over in bed (including adjusting bedclothes, sheets and blankets)?: A lot  Sitting down on and standing up from a chair with arms (e.g., wheelchair, bedside commode, etc.): Unable  Moving from lying on back to sitting on the side of the bed?: A lot  Moving to and from a bed to a chair (including a wheelchair)?: Unable  Need to walk in hospital room?: Unable  Climbing 3-5 steps with a railing?: Unable    Plan  - Progressive mobility protocol initated  - PT/OT consulted  - Fall precautions in place  - CPK normal  - awaiting further PT/OT recommendations-- therapies were unavailable due to winter weather emergency and we will hopefully resume today        Acute hypoxemic respiratory failure  Patient with Hypercapnic and Hypoxic Respiratory failure which is Acute.  she is not on home oxygen. Supplemental oxygen was provided and noted- 5 L via nasal cannulaOxygen Concentration (%):  [40] 40  ABG  Recent Labs   Lab 01/20/25  1440   PH 7.370   PO2 20*   PCO2 50.9*   HCO3 29.4*   BE 4*     .   CXR 1/18: Cardiomegaly with no acute pulmonary process   Repeat CXR 1/20: Cardiomegaly with no acute pulmonary process   -----------------------------------  Signs/symptoms of respiratory failure include- tachypnea and increased work of breathing. Contributing diagnoses includes - CHF, COPD, and Obesity Hypoventilation Labs and images were reviewed. Patient Has recent ABG, which has been reviewed. Will treat underlying causes and adjust management of respiratory failure as follows-   ================================================  Continue supplemental O2, weaning as tolerated  Given dose of IV Solu-Medrol and also Diuresed  with IV Lasix 1/22  Continue prednisone daily, Mucinex DM 1200 b.i.d., incentive spirometry, flutter therapy, and pulmonary toiletry  Needs to mobilize,however, PT/OT has been unavailable due to current winter weather emergency and we will hopefully resume services today    Anterior  cerebral artery aneurysm  MRA Brain:Questionable 1.8 mm aneurysm arising from the left TABATHA A1 segment.   ===================================  Nonurgent/  Small size--> <7mm   Maintain adequate blood pressure control  Will refer to Neurosurgery for evaluation when discharged      Chronic pain syndrome  Holding chronic gabapentin and hydrocodone due to encephalopathy  Hydrocodone resumed by on-call provider for pain 1/21, and patient is started to have intermittent confusion and hydrocodone was discontinued  Continue Tylenol for pain--dose increased 1/22  Monitor closely    Acute encephalopathy  Appears to have resolved  Suspect multifactorial:  Polypharmacy, CKD, hypoxia   CT of head negative, cardiac enzymes negative, CUS bilateral carotid stenosis of 50-69%  MRI brain negative for anything acute  Encephalopathy labs negative so far  SLP consulted-- no swallowing deficits  PT/OT consulted--- recommend moderate intensity therapy  MRA of head > 50% stenosis of left internal carotid artery  cavernous segment. Questionable 1.8 mm aneurysm arising from the left TABATHA A1 segment.  Consider further evaluation with digital subtraction angiography. Developmentally diminutive or narrowed left TABATHA A1 segment.  EEG pending  Hydrocodone resumed 1/21 per on-call provider and noted to have intermittent confusion-and was discontinued yesterday    Acute kidney injury superimposed on stage 4 chronic kidney disease  JEFF is likely due to pre-renal azotemia due to dehydration. Baseline creatinine is  1.7- 1.9 . Most recent creatinine and eGFR are listed below.  Recent Labs     01/22/25  0603 01/23/25  0520 01/24/25  0555   CREATININE 1.6* 1.6* 1.6*   EGFRNORACEVR 34.3* 34.3* 34.3*        Plan  - very mild/ JEFF is resolved--creatinine at baseline  - Avoid nephrotoxins and renally dose meds for GFR listed above  - Monitor urine output, serial BMP, and adjust therapy as needed    Chronic combined systolic and diastolic heart failure  Patient  is identified as having Combined Systolic and Diastolic heart failure that is Chronic. CHF is currently controlled. Latest ECHO performed and demonstrates- Results for orders placed during the hospital encounter of 12/07/24    Echo Saline Bubble? No    Interpretation Summary    Left Ventricle: The left ventricle is normal in size. Moderately increased wall thickness. Unable to assess wall motion. There is normal systolic function with a visually estimated ejection fraction of 55 - 60%. Unable to assess diastolic function due to atrial fibrillation.    Right Ventricle: Right ventricle was not well visualized due to poor acoustic window.    Left Atrium: Left atrium is severely dilated.    Right Atrium: Right atrium is severely dilated.    Aortic Valve: There is moderate to severe stenosis. Aortic valve area by VTI is 1.0 cm². Aortic valve peak velocity is 3.4 m/s. Mean gradient is 27.0 mmHg. The dimensionless index is 0.32. There is mild aortic regurgitation.    IVC/SVC: Elevated venous pressure at 15 mmHg.  . Continue Beta Blocker Aldactone Bumex  and monitor clinical status closely. Monitor on telemetry. Patient is off CHF pathway.  Monitor strict Is&Os and daily weights.  Not on fluid restriction. Cardiology is not consulted. Continue to stress to patient importance of self efficacy and  on diet for CHF. Last BNP reviewed- and noted below   Component Ref Range & Units 13 d ago  (1/6/25)   BNP 0 - 99 pg/mL 1,065   .    ===================================  Troponin normal   Have been holding chronic diuretics due to JEFF, which has improved  Given IV Lasix x1 1/20 and 1/22 for increased oxygen demands  CXR unremarkable/no edema    Moderate aortic stenosis  Echocardiogram with evidence of aortic stenosis that is severe . The patient's most recent echocardiogram result is listed below. We will manage the valvular abnormality by monitor for volume overload    Echo Saline Bubble? No    Result Date: 12/8/2024     Left Ventricle: The left ventricle is normal in size. Moderately   increased wall thickness. Unable to assess wall motion. There is normal   systolic function with a visually estimated ejection fraction of 55 - 60%.   Unable to assess diastolic function due to atrial fibrillation.    Right Ventricle: Right ventricle was not well visualized due to poor   acoustic window.    Left Atrium: Left atrium is severely dilated.    Right Atrium: Right atrium is severely dilated.    Aortic Valve: There is moderate to severe stenosis. Aortic valve area   by VTI is 1.0 cm². Aortic valve peak velocity is 3.4 m/s. Mean gradient is   27.0 mmHg. The dimensionless index is 0.32. There is mild aortic   regurgitation.    IVC/SVC: Elevated venous pressure at 15 mmHg.        ===========================================  Following with Dr. Ghosh for valve workup  Monitor for volume overload  Cardiology consulted per family request  1/24:  Cardiology recommended decreasing amiodarone to 100 mg daily; No recommendations to address AS further while inpatient per cardiology; cardiology signed off and will be available prn      Persistent atrial fibrillation  Patient has persistent (7 days or more) atrial fibrillation. Patient is currently in atrial fibrillation. [id number] Score: 3. The patients heart rate in the last 24 hours is as follows:  Pulse  Min: 81  Max: 109     Antiarrhythmics  amiodarone tablet 200 mg, Daily, Oral  metoprolol succinate (TOPROL-XL) 24 hr tablet 50 mg, 2 times daily, Oral    Anticoagulants  apixaban tablet 5 mg, 2 times daily, Oral    Plan  - Replete lytes with a goal of K>4, Mg >2  - Patient is anticoagulated, see medications listed above.  - Patient's afib is currently controlled    Chronic obstructive pulmonary disease  Patient's COPD is with exacerbation noted by worsening of baseline hypoxia currently.  Patient is currently off COPD Pathway. Continue scheduled inhalers  nebulizers and Supplemental oxygen and monitor  respiratory status closely.   ====================================  See acute hypoxic/hypercarbia respiratory failure A/P    Morbid obesity  Body mass index is 43.05 kg/m². Morbid obesity complicates all aspects of disease management from diagnostic modalities to treatment. Weight loss encouraged and health benefits explained to patient.     Anemia  Anemia is likely due to Iron deficiency and chronic disease due to Chronic Kidney Disease. Most recent hemoglobin and hematocrit are listed below.  Recent Labs     01/22/25  0603 01/23/25  0520   HGB 8.0* 8.8*   HCT 27.1* 30.5*       Plan  - Monitor serial CBC: Daily  - Transfuse PRBC if patient becomes hemodynamically unstable, symptomatic or H/H drops below 7/21.  - Patient has received 1 units of PRBCs on 1/21  - Patient's anemia is currently improving after transfusion  - continue chronic supplementation      VTE Risk Mitigation (From admission, onward)           Ordered     IP VTE HIGH RISK PATIENT  Once         01/19/25 1559     Reason for No Pharmacological VTE Prophylaxis  Once        Question:  Reasons:  Answer:  Patient is Ambulatory    01/19/25 1559     apixaban tablet 5 mg  2 times daily         01/18/25 1949                    Discharge Planning   VALERIANO:      Code Status: Full Code   Medical Readiness for Discharge Date:   Discharge Plan A: Skilled Nursing Facility                Please place Justification for DME        IRWIN MckennaC  Department of Hospital Medicine   [facility]

## 2025-01-24 NOTE — PLAN OF CARE
Spoke to patient about SNF placement she was only agreeable to send referrals to [facility] / [facility].  Referrals sent, auth requested from N.       01/24/25 8903   Post-Acute Status   Post-Acute Authorization Placement   Post-Acute Placement Status Referrals Sent   Discharge Plan   Discharge Plan A Skilled Nursing Facility   Discharge Plan B Skilled Nursing Facility

## 2025-01-24 NOTE — PT/OT/SLP PROGRESS
"Occupational Therapy   Treatment    Name: Iris Mueller  MRN: [id number]  Admitting Diagnosis:  Syncope       Recommendations:     Discharge Recommendations: Moderate Intensity Therapy  Discharge Equipment Recommendations:  to be determined by next level of care  Barriers to discharge:  Other (Comment) (increased assistance needed for ADL's, functional mobility)    Assessment:     Iris Mueller is a 69 y.o. female with a medical diagnosis of Syncope. Performance deficits affecting function are weakness, impaired endurance, impaired self care skills, impaired functional mobility, gait instability, impaired balance, pain, edema.     Patient seen with PT to maximize safety with functional mobility for first time, Patient complained of chest pain when coughing and left abdominal pain from fall. Patient performed supine>sit with MaxA and tactile cues for BLE's. Sitting balance on EOB with supervision. Sit\> stand with modA X2, Sidesteps and stand pivot to chair moderate>max A x1, patient unable to stand upright and maintained leaning forward posture throughout transfer. O2 89, BP normal during sitting EOB.    Rehab Prognosis:  Good; patient would benefit from acute skilled OT services to address these deficits and reach maximum level of function.       Plan:     Patient to be seen 5 x/week to address the above listed problems via self-care/home management, therapeutic activities, therapeutic exercises  Plan of Care Expires: 02/19/25  Plan of Care Reviewed with: patient    Subjective     Chief Complaint: "my chest hurts when I cough"  Patient/Family Comments/goals: improved strength so I can go home  Pain/Comfort:  Pain Rating 1: other (see comments) (patient complained of pain when coughing, lower left abdominal pain upon sitting)  Location - Side 1: Left  Location - Orientation 1: lower  Location 1: abdomen  Pain Addressed 1: Reposition, Distraction  Pain Rating Post-Intervention 1: " 0/10    Objective:     Communicated with: nurse prior to session.  Patient found supine with PureWick, blood pressure cuff, peripheral IV, telemetry, oxygen upon OT entry to room.    General Precautions: Standard, fall    Orthopedic Precautions:N/A  Braces: N/A  Respiratory Status: Nasal cannula, flow 2 L/min     Occupational Performance:     Bed Mobility:    Patient completed Supine to Sit with maximal assistance     Functional Mobility/Transfers:  Patient completed Sit <> Stand Transfer with moderate assistance and of 2 persons  with  rolling walker   Functional Mobility: Patient completed transfer to chair next to bed with max A x1 and had difficulty standing upright, instead leaning forward over RW    Activities of Daily Living:  Lower Body Dressing: total assistance for donning socks      [facility] 6 Click ADL:      Treatment & Education:  Therapist provided facilitation and instruction of proper body mechanics and fall prevention strategies during tasks listed above.   Instructed patient to sit in bedside chair as tolerated daily to increase OOB/activity tolerance.   Instructed patient to use call light to have nursing staff assist with needs/transfers.   Discussed OT POC and answered all questions within OT scope of practice.    Patient left up in chair with all lines intact and call button in reach    GOALS:   Multidisciplinary Problems       Occupational Therapy Goals          Problem: Occupational Therapy    Goal Priority Disciplines Outcome Interventions   Occupational Therapy Goal     OT, PT/OT     Description: Goals to be met by: 02/19/2025     Patient will increase functional independence with ADLs by performing:    UE Dressing with Supervision.  LE Dressing with Supervision.  Grooming while standing at sink with Set-up Assistance.  Toileting from toilet with Supervision for hygiene and clothing management.   Rolling to Bilateral with Minimal Assistance.   Supine to sit with Minimal Assistance.  Toilet  transfer to toilet with Supervision.                         DME Justifications:  No DME recommended requiring DME justifications    Time Tracking:     OT Date of Treatment: 01/24/25  OT Start Time: 1126  OT Stop Time: 1158  OT Total Time (min): 32 min    Billable Minutes:Therapeutic Activity 32    OT/EMI: OT          1/24/2025

## 2025-01-24 NOTE — CONSULTS
[facility]  Consult Note      PATIENT NAME: Iris Mueller  MRN: [id number]  TODAY'S DATE: 2025  ADMIT DATE: 2025                          CONSULT REQUESTED BY: Garcia Soriano MD    SUBJECTIVE   PRINCIPAL PROBLEM: Syncope    Reason for Consult/Chief Complaint:  Carotid artery stenosis    History of Present Illness:  Iris Mueller is a 69 y.o. female with a history of tobacco abuse, COPD, HTN, atrial fibrillation on anticoagulation, and aortic stenosis who presents after sustaining a fall.  Over the past several months she has had multiple witnessed fall at home.  This recent fall she reports getting out of the shower and putting on her bathroom and realize that her knees became weak.  After her knees became weak she ended up falling but denies ever passing out or losing consciousness    Upon presentation a stroke workup was done which was negative but it did illustrate bilateral carotid artery stenosis.    She does smoke cigarettes.      Past Medical History:   Diagnosis Date    Anemia, unspecified     Arthritis     Asthma     COPD (chronic obstructive pulmonary disease)     Encounter for blood transfusion     Hypertension     Obese body habitus     Wound infection 2019    Right knee     Past Surgical History:   Procedure Laterality Date    ANGIOGRAM, CORONARY, WITH LEFT HEART CATHETERIZATION N/A 2022    Procedure: Angiogram, Coronary, with Left Heart Cath;  Surgeon: Jorge Tran MD;  Location: [facility] CATH/EP LAB;  Service: Cardiology;  Laterality: N/A;     SECTION      ECHOCARDIOGRAM,TRANSESOPHAGEAL N/A 2023    Procedure: Transesophageal echo (MARIANO) intra-procedure log documentation;  Surgeon: Araceli Sue Diagnostic;  Location: [facility] EP LAB;  Service: Cardiology;  Laterality: N/A;    INCISION AND DRAINAGE OF HEMATOMA Left 2024    Procedure: INCISION AND DRAINAGE, HEMATOMA;  Surgeon: Bryson Huerta MD;   Location: [facility] OR;  Service: Orthopedics;  Laterality: Left;    JOINT REPLACEMENT      Knee    KNEE ARTHROPLASTY Right 8/14/2019    Procedure: ARTHROPLASTY, KNEE;  Surgeon: Delio Chung MD;  Location: [facility] OR;  Service: Orthopedics;  Laterality: Right;  anesthesia:  general and block    REPLACEMENT OF WOUND VACUUM-ASSISTED CLOSURE DEVICE Right 9/12/2019    Procedure: REPLACEMENT, WOUND VAC;  Surgeon: Delio Chung MD;  Location: [facility] OR;  Service: Orthopedics;  Laterality: Right;    TONSILLECTOMY      TREATMENT OF CARDIAC ARRHYTHMIA N/A 8/31/2023    Procedure: Cardioversion or Defibrillation;  Surgeon: PJ Betancourt MD;  Location: [facility] EP LAB;  Service: Cardiology;  Laterality: N/A;  AF, MARIANO, DCCV, MAC, EH, 3 Prep    VENTRICULOGRAM, LEFT  12/23/2022    Procedure: Ventriculogram, Left;  Surgeon: Jorge Tran MD;  Location: [facility] CATH/EP LAB;  Service: Cardiology;;     Review of patient's allergies indicates:  No Known Allergies  Family History   Problem Relation Name Age of Onset    Alzheimer's disease Mother       Social History     Tobacco Use    Smoking status: Every Day     Current packs/day: 0.25     Average packs/day: 0.5 packs/day for 50.1 years (23.8 ttl pk-yrs)     Types: Cigarettes     Start date: 1975     Passive exposure: Current    Smokeless tobacco: Never    Tobacco comments:     Pt states she has been quit now for a couple weeks, plans to stay quit.   Substance Use Topics    Alcohol use: Yes     Comment: RARELY    Drug use: Never          Review of Systems:  Review of Systems   All other systems reviewed and are negative.      OBJECTIVE   VITAL SIGNS (Most Recent)  Temp: 97.4 °F (36.3 °C) (01/24/25 1604)  Pulse: 91 (01/24/25 1604)  Resp: 18 (01/24/25 1421)  BP: 119/82 (01/24/25 1604)  SpO2: (!) 92 % (01/24/25 1604)    I & O (Last 24H):  Intake/Output Summary (Last 24 hours) at 1/24/2025 1722  Last data filed at 1/24/2025 0505  Gross per 24 hour   Intake --   Output 750 ml   Net -750 ml        PHYSICAL EXAM  Physical Exam  Constitutional: Awake and oriented to person, place, and time. Appears well-developed and well-nourished. In no apparent distress.  HEENT: Normocephalic. Pupils normal and conjunctivae normal. Mucous membranes normal, no cyanosis or icterus, trachea central, no pallor or icterus is noted.  Neck: Normal range of motion. Neck supple. No JVD present. No bruit present.   Cardiovascular: Normal rate, regular rhythm and normal heart sounds. S1, S2. Exam reveals no gallop, clicks, or friction rub. No murmur noted.  Pulmonary/Chest: Effort normal and breath sounds clear to auscultation. No respiratory distress. No wheezes, rales, or coughing present.   Abdominal: Soft. Bowel sounds are normal. There is no tenderness. No rebound tenderness or guarding present. No abdomen pulsations, bruits, or masses noted.   Urinary: No simmons catheter present  Skin: Skin is warm and dry.  Extremities: No cyanosis, erythema, clubbing or edema noted at this time. No calf tenderness bilaterally.   Peripheral vascular system: Good palpable distal pulses.       INPATIENT MEDS:      amiodarone  100 mg Oral Daily    apixaban  5 mg Oral BID    arformoteroL  15 mcg Nebulization BID    atorvastatin  10 mg Oral QHS    budesonide  0.5 mg Nebulization Q12H    buPROPion  150 mg Oral BID    dextromethorphan-guaiFENesin  mg  2 tablet Oral BID    FLUoxetine  40 mg Oral Daily    ipratropium  0.5 mg Nebulization Q6H WAKE    iron polysaccharides  150 mg Oral Daily    methocarbamoL  750 mg Oral TID    metoprolol succinate  50 mg Oral BID    mirtazapine  15 mg Oral QHS    mupirocin   Topical (Top) BID    predniSONE  40 mg Oral Daily       Current Facility-Administered Medications:     0.9%  NaCl infusion (for blood administration), , Intravenous, Q24H PRN    acetaminophen, 650 mg, Oral, Q6H PRN    albuterol-ipratropium, 3 mL, Nebulization, Q4H PRN    lorazepam, 0.5 mg, Intravenous, On Call Procedure    melatonin, 6 mg,  "Oral, Nightly PRN    sodium chloride 0.9%, 10 mL, Intravenous, PRN       LABS AND DIAGNOSTICS   CURRENT/PREVIOUS VISIT EKG    CARDIAC PROFILE LAST 3 DAYS  Recent Labs   Lab 01/18/25  1400 01/20/25  0902 01/22/25  0603   BNP  --  1,065*  --    CPK 37  --  38       CBC LAST 3 DAYS  Recent Labs   Lab 01/21/25  0937 01/22/25  0603 01/23/25  0520   WBC 8.34 9.74 8.32   HGB 7.1* 8.0* 8.8*   HCT 24.5* 27.1* 30.5*    280 285       COAGULATION LAST 3 DAYS  No results for input(s): "LABPT", "INR", "APTT" in the last 168 hours.    CHEMISTRY LAST 3 DAYS  Recent Labs   Lab 01/20/25  1440 01/21/25  0937 01/22/25  0603 01/23/25  0520 01/24/25  0555   NA  --    < > 133* 138 136   K  --    < > 3.5 3.7 3.5   CO2  --    < > 24 30* 26   ANIONGAP  --    < > 10 7* 9   BUN  --    < > 18 23 27*   CREATININE  --    < > 1.6* 1.6* 1.6*   GLU  --    < > 79 123* 90   CALCIUM  --    < > 8.7 9.2 8.9   PH 7.370  --   --   --   --    MG  --    < > 1.8 1.9 1.8   ALBUMIN  --    < > 3.2* 3.5 3.5   PROT  --    < > 5.9* 6.5 6.2   ALKPHOS  --    < > 114 119 143*   ALT  --    < > 8* 9* 11   AST  --    < > 10 11 18   BILITOT  --    < > 1.0 0.9 0.7    < > = values in this interval not displayed.       MOST RECENT IMAGING  CT Shoulder Without Contrast Right  Narrative: CMS MANDATED QUALITY DATA - CT RADIATION - 436    All CT scans at this facility utilize dose modulation, iterative reconstruction, and/or weight based dosing when appropriate to reduce radiation dose to as low as reasonably achievable.    EXAMINATION:  CT SHOULDER WITHOUT CONTRAST RIGHT    CLINICAL HISTORY:  Shoulder pain, stress fracture suspected, neg xray;    TECHNIQUE:  Thin section noncontrast imaging is performed.    COMPARISON:  None    FINDINGS:  No acute fracture or osseous destructive process is observed.  Osteoarthritic changes involve the glenohumeral joint with joint space narrowing and para-articular osteophyte formation.  Small subchondral lucencies within the humeral " head and glenoid are likely reflective of degenerative cyst/geode formation.  There is suspicion of tiny loose bodies within the subscapular recess.  There does not appear to be a significant joint effusion.  There is mild arthrosis of the acromioclavicular joint.  There is mild subcutaneous edema within the lateral aspect of the upper arm.  Impression: Osteoarthritic changes involving the glenohumeral and acromioclavicular joints.    Probable small intra-articular loose bodies.    Mild soft tissue edema within the proximal right arm.    Electronically signed by: Bridger Dia  Date:    01/24/2025  Time:    15:05    Results for orders placed or performed during the hospital encounter of 01/18/25 (from the past 2160 hours)   CT Shoulder Without Contrast Right    Narrative    CMS MANDATED QUALITY DATA - CT RADIATION - 436    All CT scans at this facility utilize dose modulation, iterative reconstruction, and/or weight based dosing when appropriate to reduce radiation dose to as low as reasonably achievable.    EXAMINATION:  CT SHOULDER WITHOUT CONTRAST RIGHT    CLINICAL HISTORY:  Shoulder pain, stress fracture suspected, neg xray;    TECHNIQUE:  Thin section noncontrast imaging is performed.    COMPARISON:  None    FINDINGS:  No acute fracture or osseous destructive process is observed.  Osteoarthritic changes involve the glenohumeral joint with joint space narrowing and para-articular osteophyte formation.  Small subchondral lucencies within the humeral head and glenoid are likely reflective of degenerative cyst/geode formation.  There is suspicion of tiny loose bodies within the subscapular recess.  There does not appear to be a significant joint effusion.  There is mild arthrosis of the acromioclavicular joint.  There is mild subcutaneous edema within the lateral aspect of the upper arm.      Impression    Osteoarthritic changes involving the glenohumeral and acromioclavicular joints.    Probable small  intra-articular loose bodies.    Mild soft tissue edema within the proximal right arm.      Electronically signed by: Bridger Dia  Date:    01/24/2025  Time:    15:05   CT Head Without Contrast    Narrative    EXAMINATION:  CT HEAD WITHOUT CONTRAST    CLINICAL HISTORY:  Head trauma, minor (Age >= 65y);.    TECHNIQUE:  Noninfusion images were obtained from the skull base to the vertex.    All CT scans at this facility utilize dose modulation, iterative reconstruction, and/or weight based dosing when appropriate to reduce radiation dose to as low as reasonably achievable    CMS MANDATED QUALITY DATA - CT RADIATION - 436    COMPARISON:  12/07/2024    FINDINGS:  There is no intracranial mass, hemorrhage, or midline shift. Ventricles and sulci are mildly prominent. There are no pathologic extra-axial fluid collections.    There is no evidence of cortical ischemic change. Changes of mild chronic microvascular white matter disease are noted. Cerebellum and brainstem are normal. The calvarium is intact.      Impression    No acute intracranial abnormalities. Chronic findings as discussed above.      Electronically signed by: Iraida Millan  Date:    01/18/2025  Time:    14:52   Results for orders placed or performed during the hospital encounter of 12/07/24 (from the past 2160 hours)   CT HEAD FOR STROKE    Narrative    EXAMINATION:  CT HEAD FOR STROKE    CLINICAL HISTORY:  Neuro deficit, acute, stroke suspected;    TECHNIQUE:  Low dose axial images were obtained through the head.  Coronal and sagittal reformations were also performed. Contrast was not administered.    COMPARISON:  01/12/2024.    FINDINGS:  Patchy and confluent periventricular white matter hypoattenuation suggestive of chronic microvascular ischemic change, though nonspecific.  Mild generalized cerebral volume loss with ex vacuo dilation of the ventricles and sulci.  Partially empty sella configuration as seen previously.    No evidence of acute  territorial infarct, hemorrhage, mass effect, or midline shift.    Ventricles are normal in size and configuration.    No displaced calvarial fracture.    Visualized paranasal sinuses and mastoid air cells are essentially clear.      Impression    No CT evidence of acute intracranial abnormality. If the patient has an acute, focal neurological deficit, MRI of the brain may be indicated.    Generalized cerebral volume loss and chronic microvascular ischemic changes.      Electronically signed by: Roger Vazquez MD  Date:    12/07/2024  Time:    20:06   CT Cervical Spine Without Contrast    Narrative    EXAMINATION:  CT CERVICAL SPINE WITHOUT CONTRAST    CLINICAL HISTORY:  Neck trauma (Age >= 65y);    TECHNIQUE:  Low dose axial images, sagittal and coronal reformations were performed though the cervical spine.  Contrast was not administered.    COMPARISON:  01/12/2024    FINDINGS:  Reversal of the normal cervical lordosis.  Osteopenia.  No detectable acute fracture.  Minimal anterolisthesis at C2-3.  Moderate to advanced multilevel spondylosis.  Up to moderate central canal stenosis.  Severe bilateral foraminal stenosis at multiple levels.      Impression    No acute trauma.      Electronically signed by: Marce Narvaez  Date:    12/07/2024  Time:    22:05        ASSESSMENT/PLAN:     Active Hospital Problems    Diagnosis    *Syncope    Acute hypoxemic respiratory failure    Debility    Edema of both upper arms    Anterior cerebral artery aneurysm    Acute encephalopathy    Chronic pain syndrome    Acute kidney injury superimposed on stage 4 chronic kidney disease    Moderate aortic stenosis    Chronic combined systolic and diastolic heart failure    Persistent atrial fibrillation    Chronic obstructive pulmonary disease    Morbid obesity    Anemia     69-year-old female who presents with bilateral carotid artery stenosis.     I did personally visualize and review her imaging studies.  Her MRI was negative for  stroke.  She does have moderate 50-69% stenosis of bilateral carotid artery.  I would consider her carotid stenosis asymptomatic considering her presenting symptoms.    I did have an extensive discussion with her, her , and her niece about optimal medical management.  She is currently on an aspirin and a statin.  She is also on Eliquis for her AFib.  She does smoke and I did discuss smoking cessation with the patient in excess of 3-5 minutes.    Other than optimal medical management I do not think any intervention is needed at this time.      RECOMMENDATIONS:  Continue aspirin  Continue statin  Work on smoking cessation  Continue annual surveillance carotid ultrasound    Keerthi Sotomayor M.D.   [facility]   Date of Service: 01/24/2025

## 2025-01-24 NOTE — CARE UPDATE
01/24/25 0712   Patient Assessment/Suction   Level of Consciousness (AVPU) alert   Respiratory Effort Normal;Unlabored   All Lung Fields Breath Sounds Anterior:;Lateral:;diminished;coarse   Rhythm/Pattern, Respiratory unlabored;pattern regular;depth regular   Cough Type no productive sputum   Skin Integrity   $ Wound Care Tech Time 15 min   Area Observed Right;Left;Behind ear;Nares   Skin Appearance without discoloration   PRE-TX-O2   Device (Oxygen Therapy) nasal cannula   $ Is the patient on Low Flow Oxygen? Yes   Flow (L/min) (Oxygen Therapy) 2   SpO2 95 %   Pulse Oximetry Type Intermittent   $ Pulse Oximetry - Multiple Charge Pulse Oximetry - Multiple   Pulse 86   Resp 18   Aerosol Therapy   $ Aerosol Therapy Charges Aerosol Treatment   Daily Review of Necessity (SVN) completed   Respiratory Treatment Status (SVN) given   Treatment Route (SVN) mask;oxygen   Patient Position HOB elevated   Post Treatment Assessment (SVN) breath sounds improved   Signs of Intolerance (SVN) none   Breath Sounds Post-Respiratory Treatment   Throughout All Fields Post-Treatment All Fields   Throughout All Fields Post-Treatment aeration increased   Post-treatment Heart Rate (beats/min) 88   Post-treatment Resp Rate (breaths/min) 18   Incentive Spirometer   $ Incentive Spirometer Charges done with encouragement   Incentive Spirometer Predicted Level (mL) 1500   Administration (IS) instruction provided, follow-up   Number of Repetitions (IS) 10   Level Incentive Spirometer (mL) 750   Patient Tolerance (IS) fair   Vibratory PEP Therapy   $ Vibratory PEP Charges Aerobika Therapy   $ Vibratory PEP Equipment Aerobika Equipment   $ Vibratory PEP Tech Time Charges 15 min   Daily Review of Necessity (PEP Therapy) completed   Type (PEP Therapy) vibratory/oscillatory   Device (PEP Therapy) flutter   Route (PEP Therapy) mouthpiece   Breaths per Cycle (PEP Therapy) 10   Cycles (PEP Therapy) 1   PEP Duration (min) 10   Settings (PEP Therapy) PEP  4   Patient Position (PEP Therapy) HOB elevated   Post Treatment Assessment (PEP) breath sounds unchanged   Signs of Intolerance (PEP Therapy) none   Education   $ Education Bronchodilator;15 min

## 2025-01-24 NOTE — ASSESSMENT & PLAN NOTE
Anemia is likely due to Iron deficiency and chronic disease due to Chronic Kidney Disease. Most recent hemoglobin and hematocrit are listed below.  Recent Labs     01/22/25  0603 01/23/25  0520   HGB 8.0* 8.8*   HCT 27.1* 30.5*       Plan  - Monitor serial CBC: Daily  - Transfuse PRBC if patient becomes hemodynamically unstable, symptomatic or H/H drops below 7/21.  - Patient has received 1 units of PRBCs on 1/21  - Patient's anemia is currently improving after transfusion  - continue chronic supplementation

## 2025-01-24 NOTE — PLAN OF CARE
Problem: Skin Injury Risk Increased  Goal: Skin Health and Integrity  Outcome: Met     Problem: Adult Inpatient Plan of Care  Goal: Plan of Care Review  Outcome: Met  Goal: Patient-Specific Goal (Individualized)  Outcome: Met  Goal: Absence of Hospital-Acquired Illness or Injury  Outcome: Met  Goal: Optimal Comfort and Wellbeing  Outcome: Met  Goal: Readiness for Transition of Care  Outcome: Met     Problem: Bariatric Environmental Safety  Goal: Safety Maintained with Care  Outcome: Met     Problem: Acute Kidney Injury/Impairment  Goal: Fluid and Electrolyte Balance  Outcome: Met  Goal: Improved Oral Intake  Outcome: Met  Goal: Effective Renal Function  Outcome: Met     Problem: Wound  Goal: Optimal Coping  Outcome: Met  Goal: Optimal Functional Ability  Outcome: Met  Goal: Absence of Infection Signs and Symptoms  Outcome: Met  Goal: Improved Oral Intake  Outcome: Met  Goal: Optimal Pain Control and Function  Outcome: Met  Goal: Skin Health and Integrity  Outcome: Met  Goal: Optimal Wound Healing  Outcome: Met     Problem: Fall Injury Risk  Goal: Absence of Fall and Fall-Related Injury  Outcome: Met

## 2025-01-24 NOTE — ASSESSMENT & PLAN NOTE
Echocardiogram with evidence of aortic stenosis that is severe . The patient's most recent echocardiogram result is listed below. We will manage the valvular abnormality by monitor for volume overload    Echo Saline Bubble? No    Result Date: 12/8/2024    Left Ventricle: The left ventricle is normal in size. Moderately   increased wall thickness. Unable to assess wall motion. There is normal   systolic function with a visually estimated ejection fraction of 55 - 60%.   Unable to assess diastolic function due to atrial fibrillation.    Right Ventricle: Right ventricle was not well visualized due to poor   acoustic window.    Left Atrium: Left atrium is severely dilated.    Right Atrium: Right atrium is severely dilated.    Aortic Valve: There is moderate to severe stenosis. Aortic valve area   by VTI is 1.0 cm². Aortic valve peak velocity is 3.4 m/s. Mean gradient is   27.0 mmHg. The dimensionless index is 0.32. There is mild aortic   regurgitation.    IVC/SVC: Elevated venous pressure at 15 mmHg.        ===========================================  Following with Dr. Ghosh for valve workup  Monitor for volume overload  Cardiology consulted per family request  1/24:  Cardiology recommended decreasing amiodarone to 100 mg daily; No recommendations to address AS further while inpatient per cardiology; cardiology signed off and will be available prn

## 2025-01-24 NOTE — PT/OT/SLP PROGRESS
Physical Therapy      Patient Name:  Iris Mueller   MRN:  [id number]    Patient not seen today secondary to inclement weather and therapist unavailable on 1/21/25 and 1/22/25. Will follow-up 1/24/25.

## 2025-01-24 NOTE — ASSESSMENT & PLAN NOTE
JEFF is likely due to pre-renal azotemia due to dehydration. Baseline creatinine is  1.7- 1.9 . Most recent creatinine and eGFR are listed below.  Recent Labs     01/22/25  0603 01/23/25  0520 01/24/25  0555   CREATININE 1.6* 1.6* 1.6*   EGFRNORACEVR 34.3* 34.3* 34.3*        Plan  - very mild/ JEFF is resolved--creatinine at baseline  - Avoid nephrotoxins and renally dose meds for GFR listed above  - Monitor urine output, serial BMP, and adjust therapy as needed

## 2025-01-25 LAB
ALBUMIN SERPL BCP-MCNC: 3.4 G/DL (ref 3.5–5.2)
ALP SERPL-CCNC: 137 U/L (ref 55–135)
ALT SERPL W/O P-5'-P-CCNC: 12 U/L (ref 10–44)
ANION GAP SERPL CALC-SCNC: 11 MMOL/L (ref 8–16)
AST SERPL-CCNC: 16 U/L (ref 10–40)
BASOPHILS # BLD AUTO: 0.03 K/UL (ref 0–0.2)
BASOPHILS NFR BLD: 0.2 % (ref 0–1.9)
BILIRUB SERPL-MCNC: 0.8 MG/DL (ref 0.1–1)
BUN SERPL-MCNC: 27 MG/DL (ref 8–23)
CALCIUM SERPL-MCNC: 8.9 MG/DL (ref 8.7–10.5)
CHLORIDE SERPL-SCNC: 101 MMOL/L (ref 95–110)
CO2 SERPL-SCNC: 27 MMOL/L (ref 23–29)
CREAT SERPL-MCNC: 1.6 MG/DL (ref 0.5–1.4)
DIFFERENTIAL METHOD BLD: ABNORMAL
EOSINOPHIL # BLD AUTO: 0 K/UL (ref 0–0.5)
EOSINOPHIL NFR BLD: 0.1 % (ref 0–8)
ERYTHROCYTE [DISTWIDTH] IN BLOOD BY AUTOMATED COUNT: 19.1 % (ref 11.5–14.5)
EST. GFR  (NO RACE VARIABLE): 34.3 ML/MIN/1.73 M^2
GLUCOSE SERPL-MCNC: 73 MG/DL (ref 70–110)
HCT VFR BLD AUTO: 32 % (ref 37–48.5)
HGB BLD-MCNC: 9.3 G/DL (ref 12–16)
IMM GRANULOCYTES # BLD AUTO: 0.11 K/UL (ref 0–0.04)
IMM GRANULOCYTES NFR BLD AUTO: 0.9 % (ref 0–0.5)
LYMPHOCYTES # BLD AUTO: 1 K/UL (ref 1–4.8)
LYMPHOCYTES NFR BLD: 7.7 % (ref 18–48)
MAGNESIUM SERPL-MCNC: 1.9 MG/DL (ref 1.6–2.6)
MCH RBC QN AUTO: 26.9 PG (ref 27–31)
MCHC RBC AUTO-ENTMCNC: 29.1 G/DL (ref 32–36)
MCV RBC AUTO: 93 FL (ref 82–98)
MONOCYTES # BLD AUTO: 1.2 K/UL (ref 0.3–1)
MONOCYTES NFR BLD: 9.3 % (ref 4–15)
NEUTROPHILS # BLD AUTO: 10.5 K/UL (ref 1.8–7.7)
NEUTROPHILS NFR BLD: 81.8 % (ref 38–73)
NRBC BLD-RTO: 0 /100 WBC
PHOSPHATE SERPL-MCNC: 3 MG/DL (ref 2.7–4.5)
PLATELET # BLD AUTO: 369 K/UL (ref 150–450)
PMV BLD AUTO: 8.6 FL (ref 9.2–12.9)
POCT GLUCOSE: 92 MG/DL (ref 70–110)
POTASSIUM SERPL-SCNC: 3.5 MMOL/L (ref 3.5–5.1)
PROT SERPL-MCNC: 6.1 G/DL (ref 6–8.4)
RBC # BLD AUTO: 3.46 M/UL (ref 4–5.4)
SODIUM SERPL-SCNC: 139 MMOL/L (ref 136–145)
WBC # BLD AUTO: 12.81 K/UL (ref 3.9–12.7)

## 2025-01-25 PROCEDURE — 97530 THERAPEUTIC ACTIVITIES: CPT

## 2025-01-25 PROCEDURE — 85025 COMPLETE CBC W/AUTO DIFF WBC: CPT

## 2025-01-25 PROCEDURE — 84100 ASSAY OF PHOSPHORUS: CPT | Performed by: NURSE PRACTITIONER

## 2025-01-25 PROCEDURE — 27000221 HC OXYGEN, UP TO 24 HOURS

## 2025-01-25 PROCEDURE — 25000003 PHARM REV CODE 250: Performed by: NURSE PRACTITIONER

## 2025-01-25 PROCEDURE — 83735 ASSAY OF MAGNESIUM: CPT | Performed by: NURSE PRACTITIONER

## 2025-01-25 PROCEDURE — 94761 N-INVAS EAR/PLS OXIMETRY MLT: CPT

## 2025-01-25 PROCEDURE — 63600175 PHARM REV CODE 636 W HCPCS: Performed by: NURSE PRACTITIONER

## 2025-01-25 PROCEDURE — 94799 UNLISTED PULMONARY SVC/PX: CPT | Mod: XB

## 2025-01-25 PROCEDURE — 97535 SELF CARE MNGMENT TRAINING: CPT

## 2025-01-25 PROCEDURE — 25000003 PHARM REV CODE 250

## 2025-01-25 PROCEDURE — 36415 COLL VENOUS BLD VENIPUNCTURE: CPT | Performed by: NURSE PRACTITIONER

## 2025-01-25 PROCEDURE — 99900031 HC PATIENT EDUCATION (STAT)

## 2025-01-25 PROCEDURE — 99900035 HC TECH TIME PER 15 MIN (STAT)

## 2025-01-25 PROCEDURE — 97530 THERAPEUTIC ACTIVITIES: CPT | Mod: CQ

## 2025-01-25 PROCEDURE — 94664 DEMO&/EVAL PT USE INHALER: CPT

## 2025-01-25 PROCEDURE — 80053 COMPREHEN METABOLIC PANEL: CPT | Performed by: NURSE PRACTITIONER

## 2025-01-25 PROCEDURE — 94640 AIRWAY INHALATION TREATMENT: CPT

## 2025-01-25 PROCEDURE — 12000002 HC ACUTE/MED SURGE SEMI-PRIVATE ROOM

## 2025-01-25 PROCEDURE — 25000242 PHARM REV CODE 250 ALT 637 W/ HCPCS: Performed by: HOSPITALIST

## 2025-01-25 RX ORDER — BENZONATATE 100 MG/1
100 CAPSULE ORAL 3 TIMES DAILY PRN
Status: DISCONTINUED | OUTPATIENT
Start: 2025-01-25 | End: 2025-01-28 | Stop reason: HOSPADM

## 2025-01-25 RX ORDER — PREDNISONE 20 MG/1
40 TABLET ORAL DAILY
Status: DISCONTINUED | OUTPATIENT
Start: 2025-01-26 | End: 2025-01-27

## 2025-01-25 RX ORDER — ASPIRIN 81 MG/1
81 TABLET ORAL DAILY
Status: DISCONTINUED | OUTPATIENT
Start: 2025-01-25 | End: 2025-01-28 | Stop reason: HOSPADM

## 2025-01-25 RX ADMIN — ATORVASTATIN CALCIUM 10 MG: 10 TABLET, FILM COATED ORAL at 08:01

## 2025-01-25 RX ADMIN — BUDESONIDE INHALATION 0.5 MG: 0.5 SUSPENSION RESPIRATORY (INHALATION) at 06:01

## 2025-01-25 RX ADMIN — FLUOXETINE HYDROCHLORIDE 40 MG: 20 CAPSULE ORAL at 08:01

## 2025-01-25 RX ADMIN — IPRATROPIUM BROMIDE 0.5 MG: 0.5 SOLUTION RESPIRATORY (INHALATION) at 01:01

## 2025-01-25 RX ADMIN — MIRTAZAPINE 15 MG: 15 TABLET, FILM COATED ORAL at 08:01

## 2025-01-25 RX ADMIN — MUPIROCIN 1 G: 20 OINTMENT TOPICAL at 08:01

## 2025-01-25 RX ADMIN — METHOCARBAMOL TABLETS 750 MG: 750 TABLET, COATED ORAL at 08:01

## 2025-01-25 RX ADMIN — PREDNISONE 40 MG: 20 TABLET ORAL at 08:01

## 2025-01-25 RX ADMIN — METHOCARBAMOL TABLETS 750 MG: 750 TABLET, COATED ORAL at 09:01

## 2025-01-25 RX ADMIN — BUDESONIDE INHALATION 0.5 MG: 0.5 SUSPENSION RESPIRATORY (INHALATION) at 08:01

## 2025-01-25 RX ADMIN — METOPROLOL SUCCINATE 50 MG: 50 TABLET, FILM COATED, EXTENDED RELEASE ORAL at 08:01

## 2025-01-25 RX ADMIN — GUAIFENESIN AND DEXTROMETHORPHAN HYDROBROMIDE 2 TABLET: 600; 30 TABLET, EXTENDED RELEASE ORAL at 08:01

## 2025-01-25 RX ADMIN — ASPIRIN 81 MG: 81 TABLET, COATED ORAL at 03:01

## 2025-01-25 RX ADMIN — APIXABAN 5 MG: 5 TABLET, FILM COATED ORAL at 08:01

## 2025-01-25 RX ADMIN — ARFORMOTEROL TARTRATE 15 MCG: 15 SOLUTION RESPIRATORY (INHALATION) at 08:01

## 2025-01-25 RX ADMIN — Medication 150 MG: at 08:01

## 2025-01-25 RX ADMIN — BUPROPION HYDROCHLORIDE 150 MG: 150 TABLET, FILM COATED, EXTENDED RELEASE ORAL at 08:01

## 2025-01-25 RX ADMIN — IPRATROPIUM BROMIDE 0.5 MG: 0.5 SOLUTION RESPIRATORY (INHALATION) at 06:01

## 2025-01-25 RX ADMIN — GUAIFENESIN AND DEXTROMETHORPHAN HYDROBROMIDE 2 TABLET: 600; 30 TABLET, EXTENDED RELEASE ORAL at 05:01

## 2025-01-25 RX ADMIN — AMIODARONE HYDROCHLORIDE 100 MG: 100 TABLET ORAL at 08:01

## 2025-01-25 RX ADMIN — IPRATROPIUM BROMIDE 0.5 MG: 0.5 SOLUTION RESPIRATORY (INHALATION) at 08:01

## 2025-01-25 RX ADMIN — METHOCARBAMOL TABLETS 750 MG: 750 TABLET, COATED ORAL at 03:01

## 2025-01-25 RX ADMIN — MUPIROCIN: 20 OINTMENT TOPICAL at 09:01

## 2025-01-25 RX ADMIN — ARFORMOTEROL TARTRATE 15 MCG: 15 SOLUTION RESPIRATORY (INHALATION) at 06:01

## 2025-01-25 NOTE — ASSESSMENT & PLAN NOTE
Appears to have resolved  Suspect multifactorial:  Polypharmacy, CKD, hypoxia   CT of head negative, cardiac enzymes negative, CUS bilateral carotid stenosis of 50-69%  MRI brain negative for anything acute  Encephalopathy labs negative so far  SLP consulted-- no swallowing deficits  PT/OT consulted--- recommend moderate intensity therapy  MRA of head > 50% stenosis of left internal carotid artery  cavernous segment. Questionable 1.8 mm aneurysm arising from the left TABATHA A1 segment.  Consider further evaluation with digital subtraction angiography. Developmentally diminutive or narrowed left TABATHA A1 segment.  Hydrocodone resumed 1/21 per on-call provider and noted to have intermittent confusion-and was discontinued yesterday  Mental status at baseline

## 2025-01-25 NOTE — SUBJECTIVE & OBJECTIVE
Interval History:  Patient was seen and examined.  Overnight notes reviewed.  She reports feeling weak, cough, fatigue, and VALERIO. She also reports arthralgias to right shoulder. Re-peat CXR ordered.  PT/OT following and recommending moderate intensive therapy.  Case management following for discharge planning.  Patient was agreeable to SNF. Family at bedside and updated on plan of care. Encouraged aggressive IS and acapella.     Review of Systems   Constitutional:  Positive for fatigue.   Respiratory:  Positive for cough and shortness of breath (VALERIO).    Cardiovascular:  Negative for chest pain.   Gastrointestinal:  Negative for abdominal pain and nausea.   Musculoskeletal:  Positive for arthralgias.   Skin:  Positive for color change (bruising).   Neurological:  Positive for weakness (generalized).     Objective:     Vital Signs (Most Recent):  Temp: 98.2 °F (36.8 °C) (01/25/25 1133)  Pulse: 87 (01/25/25 1323)  Resp: 18 (01/25/25 1323)  BP: 126/88 (01/25/25 1133)  SpO2: (!) 94 % (01/25/25 1323) Vital Signs (24h Range):  Temp:  [97.4 °F (36.3 °C)-99.3 °F (37.4 °C)] 98.2 °F (36.8 °C)  Pulse:  [] 87  Resp:  [15-18] 18  SpO2:  [85 %-97 %] 94 %  BP: (113-127)/(78-88) 126/88     Weight: 110.2 kg (243 lb)  Body mass index is 43.05 kg/m².    Intake/Output Summary (Last 24 hours) at 1/25/2025 1414  Last data filed at 1/25/2025 0943  Gross per 24 hour   Intake 300 ml   Output 750 ml   Net -450 ml         Physical Exam  Vitals and nursing note reviewed.   Constitutional:       General: She is not in acute distress.     Appearance: Normal appearance. She is obese. She is ill-appearing (chroncially).   HENT:      Head: Normocephalic and atraumatic.      Mouth/Throat:      Mouth: Mucous membranes are dry.   Eyes:      Extraocular Movements: Extraocular movements intact.   Cardiovascular:      Rate and Rhythm: Normal rate. Rhythm irregular.      Heart sounds: Murmur heard.   Pulmonary:      Effort: Pulmonary effort is  normal. No tachypnea or respiratory distress.      Breath sounds: Decreased breath sounds present.   Abdominal:      General: Abdomen is flat. Bowel sounds are normal.      Palpations: Abdomen is soft.      Tenderness: There is no abdominal tenderness. There is no guarding or rebound.   Musculoskeletal:      Right lower leg: No edema.      Left lower leg: No edema.      Comments: No bony tenderness to palpation of right shoulder   Neurological:      General: No focal deficit present.      Mental Status: She is alert and oriented to person, place, and time. Mental status is at baseline.   Psychiatric:         Mood and Affect: Mood normal.         Behavior: Behavior normal.             Significant Labs: All pertinent labs within the past 24 hours have been reviewed.  CBC:   Recent Labs   Lab 01/25/25  0718   WBC 12.81*   HGB 9.3*   HCT 32.0*        CMP:   Recent Labs   Lab 01/24/25  0555 01/25/25  0718    139   K 3.5 3.5    101   CO2 26 27   GLU 90 73   BUN 27* 27*   CREATININE 1.6* 1.6*   CALCIUM 8.9 8.9   PROT 6.2 6.1   ALBUMIN 3.5 3.4*   BILITOT 0.7 0.8   ALKPHOS 143* 137*   AST 18 16   ALT 11 12   ANIONGAP 9 11     Magnesium:   Recent Labs   Lab 01/24/25  0555 01/25/25  0718   MG 1.8 1.9       Significant Imaging: I have reviewed all pertinent imaging results/findings within the past 24 hours.    CXR:  Unchanged cardiomegaly with new mild-to-moderate right diaphragm elevation or eventration.     CT shoulder:  Osteoarthritic changes involving the glenohumeral and acromioclavicular joints.     Probable small intra-articular loose bodies.     Mild soft tissue edema within the proximal right arm.

## 2025-01-25 NOTE — ASSESSMENT & PLAN NOTE
Patient with Hypercapnic and Hypoxic Respiratory failure which is Acute.  she is not on home oxygen. Supplemental oxygen was provided and noted- 5 L via nasal cannula   ABG  Recent Labs   Lab 01/20/25  1440   PH 7.370   PO2 20*   PCO2 50.9*   HCO3 29.4*   BE 4*     .   CXR 1/18: Cardiomegaly with no acute pulmonary process   Repeat CXR 1/20: Cardiomegaly with no acute pulmonary process   -----------------------------------  Signs/symptoms of respiratory failure include- tachypnea and increased work of breathing. Contributing diagnoses includes - CHF, COPD, and Obesity Hypoventilation Labs and images were reviewed. Patient Has recent ABG, which has been reviewed. Will treat underlying causes and adjust management of respiratory failure as follows-   ================================================  Continue supplemental O2, weaning as tolerated  Given dose of IV Solu-Medrol and also Diuresed  with IV Lasix 1/22  Continue prednisone daily, Mucinex DM 1200 b.i.d., incentive spirometry, flutter therapy, and pulmonary toiletry  Needs to mobilize,however, PT/OT has been unavailable due to current winter weather emergency and we will hopefully resume services today  Aggressive IS and acapella; supplemental oxygen; repeat CXR without effusion or consolidation; PT/OT; encouraged ambulation and OOB with meals

## 2025-01-25 NOTE — PT/OT/SLP PROGRESS
"Physical Therapy Treatment    Patient Name:  Iris Mueller   MRN:  [id number]    Recommendations:     Discharge Recommendations: Moderate Intensity Therapy  Discharge Equipment Recommendations: to be determined by next level of care  Barriers to discharge:  pain, decreased caregiver support    Assessment:     Iris Mueller is a 69 y.o. female admitted with a medical diagnosis of Syncope.  She presents with the following impairments/functional limitations: weakness, impaired endurance, impaired functional mobility, gait instability, impaired cardiopulmonary response to activity, pain.    Pt declined chair transfer but participated in two trials of sidestepping along EOB and standing marches. Continues to require maxA for supine to sit (due to trunk/chest pain) and modAx2 persons for sit to supine. However, improved STS to Damaso of 1 person today. Required Damaso of 2 persons to sidestep along EOB and march in place, with RW support throughout.     Improved mobility today; limited by pain and endurance deficits.     Rehab Prognosis: Good and Fair; patient would benefit from acute skilled PT services to address these deficits and reach maximum level of function.    Recent Surgery: * No surgery found *      Plan:     During this hospitalization, patient to be seen 6 x/week to address the identified rehab impairments via gait training, therapeutic activities, therapeutic exercises, neuromuscular re-education and progress toward the following goals:    Plan of Care Expires:  02/19/25    Subjective     Chief Complaint: pain, anterior chest and general trunk  Patient/Family Comments/goals: "I really want to take a nap. Nobody lets you rest around here."  Pain/Comfort:  Pain Rating 1:  (unrated)  Location - Side 1: Bilateral  Location - Orientation 1: anterior  Location 1: chest  Pain Addressed 1: Distraction, Reposition, Cessation of Activity  Pain Rating Post-Intervention 1:  (was left in position of " comfort)      Objective:     Communicated with nurse prior to session.  Patient found HOB elevated with PureWick, peripheral IV, telemetry, oxygen, bed alarm upon PT entry to room.     General Precautions: Standard, fall  Orthopedic Precautions: N/A  Braces: N/A  Respiratory Status: Nasal cannula, flow 2 L/min     Functional Mobility:  Bed Mobility:     Supine to Sit: maxA at trunk  Sit to Supine: modAx2  Transfers:     Sit to Stand:  Damaso and TC/VC with rolling walker  Gait: two trials of sidesteps x 3' R and L followed by marches x 5 reps; seated rest btwn; RW and minAx2      AM-PAC 6 CLICK MOBILITY          Treatment & Education:    -Pt educ: benefits of participation with therapy, OOB to chair during the day and for all meals, staff assist for mobility, call light, fall prevention     Patient left HOB elevated with all lines intact, call button in reach, bed alarm on, and nurse notified..    GOALS:   Multidisciplinary Problems       Physical Therapy Goals          Problem: Physical Therapy    Goal Priority Disciplines Outcome Interventions   Physical Therapy Goal     PT, PT/OT     Description: Goals to be met by: 2025     Patient will increase functional independence with mobility by performin. Supine to sit with Contact Guard Assistance  2. Sit to supine with Contact Guard Assistance  3. Sit to stand transfer with Minimal Assistance  4. Gait  x 100 feet with Minimal Assistance using Rolling Walker.                          DME Justifications:      Time Tracking:     PT Received On: 25  PT Start Time: 1040     PT Stop Time: 1057  PT Total Time (min): 17 min     Billable Minutes: Therapeutic Activity 17    Treatment Type: Treatment  PT/PTA: PTA     Number of PTA visits since last PT visit: 2     2025

## 2025-01-25 NOTE — ASSESSMENT & PLAN NOTE
Anemia is likely due to Iron deficiency and chronic disease due to Chronic Kidney Disease. Most recent hemoglobin and hematocrit are listed below.  Recent Labs     01/23/25  0520 01/25/25  0718   HGB 8.8* 9.3*   HCT 30.5* 32.0*       Plan  - Monitor serial CBC: Daily  - Transfuse PRBC if patient becomes hemodynamically unstable, symptomatic or H/H drops below 7/21.  - Patient has received 1 units of PRBCs on 1/21  - Patient's anemia is currently improving after transfusion  - continue chronic supplementation

## 2025-01-25 NOTE — PROGRESS NOTES
[facility] Medicine  Progress Note    Patient Name: Iris Mueller  MRN: [id number]  Patient Class: IP- Inpatient   Admission Date: 1/18/2025  Length of Stay: 4 days  Attending Physician: Garcia Soriano MD  Primary Care Provider: Elkin You MD        Subjective     Principal Problem:Syncope        HPI:  69-year-old female with a past medical history of COPD asthma, HTN, obesity, CHF, Right knee wound infection, arthritis who presented to the emergency room for  syncope and fall.  She reports just prior to fall and she felt dizzy.  She is unaware of the events of the fall.  Patient has had frequent falls recently. She fell getting out of the shower. She is not entirely sure what happened. She is not sure why she fell. She has pain all over. Mostly complains of pain to her bilateral knees and to her chest. She says that she does not know why she fell but she was in the shower when it happened. She does not think that she slipped a she does take Norco 3 times a day and gabapentin.     In the ER, patient atrial fibrillation,t mild anemia, with H&H 8.7 and 28.1, JEFF with BUN 27 creatinine 2.2, baseline creatinine is 1.81., L dysphagia is 174, UA negative for infection CT head nonacute, right humerus x-ray nonacute, pelvic x-ray no acute fractures, right knee with diffuse soft tissue edema, chest x-ray nonacute with some cardiomegaly    Admit to hospital medicine for syncope workup consult PT/OT , JEFF    I spoke to daughter who states patient recently was seen by cardiologist who stopped 1 of her blood pressure medicines but daughter is concerned patient did not stopped taking the blood pressure medication and her blood pressure dropped    Overview/Hospital Course:  Ms. Mueller was admitted after having a fall at home.  Syncope was suspected since she was unable to recall the episode.  While here, her labs and vital signs were monitored and she was maintained on telemetry.   She was also found to be acutely encephalopathic and somnolent. When records were reviewed, she was noted to be last admitted to the hospital 12/2024 for AMS and a CVA workup was negative for stroke at that time.  Neurology also evaluated her at that time and her symptoms were suspected to likely be due to her chronic pain medication.      Due to her somnolence, Encephalopathy labs were done and were negative.  Her chronic hydrocodone was held.  Her carotid ultrasound showed bilateral 50-69% carotid stenosis. She had worsening hypoxia requiring increased levels of supplemental O2.  She was diuresed with a dose of IV Lasix and a CXR was done which was unremarkable.  She had an MRI of the head which was negative for anything acute, and a brain MRA which was negative for stenosis and incidentally showed a > 50% stenosis of left internal carotid artery  cavernous segment. Questionable 1.8 mm aneurysm arising from the left TABATHA A1 segment, which was a non urgent finding.  MRA of the neck was ordered however patient was uncooperative, despite getting IV Ativan, and it could not be done.  SLP was consulted and no swallowing deficits were noted.  PT/OT were consulted and recommended moderate intensity therapy.  Her encephalopathy started to improve.  An EEG was initially ordered since she had persistent somnolence, however, since her mental status improved, the EEG was canceled.    Family had concern about her right shoulder due to patient complaining of significant pain and limited ability of using that arm.  An MRI of the shoulder was ordered for patient was not cooperative enough for imaging to be done.  Patient's encephalopathy was slow to improve and EEG was ordered.  She has a history of severe AS and Cardiology was consulted for an evaluation per the family request.  She had also presented with mild JEFF, which improved quickly.  She developed swelling to her arms, with the right being greater.  This was suspected to  possibly be due to either dependent edema due to not using or or trauma from the fall.  Venous ultrasound was ordered and was negative for DVT.  Compression was also applied to the arm.    She continued to require increased supplemental O2, and this was thought to be due to hypoventilatory respiratory failure, encephalopathy, underlying COPD and CHF.  ABG was done and showed hypoxia mild hypercarbia.  Her hemoglobin was noted to remain 7-7.2, and since she has a history of CHF, she was transfused 1 unit PRBC to maintain hemoglobin closer to 8. She was maintained on nebulizers.  She was given another dose of IV Lasix, and also given IV Solu-Medrol followed by oral prednisone.  Flutter therapy was added.      Interval History:  Patient was seen and examined.  Overnight notes reviewed.  She reports feeling weak, cough, fatigue, and VALERIO. She also reports arthralgias to right shoulder. Re-peat CXR ordered.  PT/OT following and recommending moderate intensive therapy.  Case management following for discharge planning.  Patient was agreeable to SNF. Family at bedside and updated on plan of care. Encouraged aggressive IS and acapella.     Review of Systems   Constitutional:  Positive for fatigue.   Respiratory:  Positive for cough and shortness of breath (VALERIO).    Cardiovascular:  Negative for chest pain.   Gastrointestinal:  Negative for abdominal pain and nausea.   Musculoskeletal:  Positive for arthralgias.   Skin:  Positive for color change (bruising).   Neurological:  Positive for weakness (generalized).     Objective:     Vital Signs (Most Recent):  Temp: 98.2 °F (36.8 °C) (01/25/25 1133)  Pulse: 87 (01/25/25 1323)  Resp: 18 (01/25/25 1323)  BP: 126/88 (01/25/25 1133)  SpO2: (!) 94 % (01/25/25 1323) Vital Signs (24h Range):  Temp:  [97.4 °F (36.3 °C)-99.3 °F (37.4 °C)] 98.2 °F (36.8 °C)  Pulse:  [] 87  Resp:  [15-18] 18  SpO2:  [85 %-97 %] 94 %  BP: (113-127)/(78-88) 126/88     Weight: 110.2 kg (243 lb)  Body  mass index is 43.05 kg/m².    Intake/Output Summary (Last 24 hours) at 1/25/2025 1414  Last data filed at 1/25/2025 0943  Gross per 24 hour   Intake 300 ml   Output 750 ml   Net -450 ml         Physical Exam  Vitals and nursing note reviewed.   Constitutional:       General: She is not in acute distress.     Appearance: Normal appearance. She is obese. She is ill-appearing (chroncially).   HENT:      Head: Normocephalic and atraumatic.      Mouth/Throat:      Mouth: Mucous membranes are dry.   Eyes:      Extraocular Movements: Extraocular movements intact.   Cardiovascular:      Rate and Rhythm: Normal rate. Rhythm irregular.      Heart sounds: Murmur heard.   Pulmonary:      Effort: Pulmonary effort is normal. No tachypnea or respiratory distress.      Breath sounds: Decreased breath sounds present.   Abdominal:      General: Abdomen is flat. Bowel sounds are normal.      Palpations: Abdomen is soft.      Tenderness: There is no abdominal tenderness. There is no guarding or rebound.   Musculoskeletal:      Right lower leg: No edema.      Left lower leg: No edema.      Comments: No bony tenderness to palpation of right shoulder   Neurological:      General: No focal deficit present.      Mental Status: She is alert and oriented to person, place, and time. Mental status is at baseline.   Psychiatric:         Mood and Affect: Mood normal.         Behavior: Behavior normal.             Significant Labs: All pertinent labs within the past 24 hours have been reviewed.  CBC:   Recent Labs   Lab 01/25/25  0718   WBC 12.81*   HGB 9.3*   HCT 32.0*        CMP:   Recent Labs   Lab 01/24/25  0555 01/25/25  0718    139   K 3.5 3.5    101   CO2 26 27   GLU 90 73   BUN 27* 27*   CREATININE 1.6* 1.6*   CALCIUM 8.9 8.9   PROT 6.2 6.1   ALBUMIN 3.5 3.4*   BILITOT 0.7 0.8   ALKPHOS 143* 137*   AST 18 16   ALT 11 12   ANIONGAP 9 11     Magnesium:   Recent Labs   Lab 01/24/25  0555 01/25/25  0718   MG 1.8 1.9        Significant Imaging: I have reviewed all pertinent imaging results/findings within the past 24 hours.    CXR:  Unchanged cardiomegaly with new mild-to-moderate right diaphragm elevation or eventration.     CT shoulder:  Osteoarthritic changes involving the glenohumeral and acromioclavicular joints.     Probable small intra-articular loose bodies.     Mild soft tissue edema within the proximal right arm.     Assessment and Plan     * Syncope  Possibly medication induced (on hydrocodone and gabapentin) vs neurologic etiology vs cardiogenic/aortic stenosis possibly contributing  Family endorses that she did not pass out, but that her legs got weak and she went down, and patient remained awake and alert during episode.  They also endorsed that this is not an unusual/new issue for her.  =================================  CT of head negative  UA negative for infection   Last TTE 12/08/2024: EF 55-60%, indeterminate diastolic function, moderate to severe AS  MRI Brain:  Negative for anything acute  MRA of head > 50% stenosis of left internal carotid artery  cavernous segment. Questionable 1.8 mm aneurysm arising from the left TABATHA A1 segment.  Consider further evaluation with digital subtraction angiography. Developmentally diminutive or narrowed left TABATHA A1 segment.   =======================================  MRA of neck:  Pending--not obtain as of yet due to patient uncooperative  Orthostatics unable to be obtained due to current condition/ we will await PT for assistance.  PT not available since 1/21  due to winter weather emergency  PT/OT recommends moderate intensity   Maintain fall/syncope precautions  Vascular surgery evaluated pt and recommended medical management with asa, statin, and eliquis      Edema of both upper arms  Worsened while in the hospital, R>L, possible due to dependent edema or trauma from her fall  Patient anticoagulated  venous ultrasound of BUE-- negative for DVT  Continue elevation and  compression to right upper extremity      Debility  Patient with Acute on chronic debility due to age-related physical debility and underlying chronic illnesses . The patient's latest AMPAC (Activity Measure for Post Acute Care) Score is listed below.    AM-PAC Score - How much help does the patient need for each activity listed  Basic Mobility Total Score: 8  Turning over in bed (including adjusting bedclothes, sheets and blankets)?: A lot  Sitting down on and standing up from a chair with arms (e.g., wheelchair, bedside commode, etc.): Unable  Moving from lying on back to sitting on the side of the bed?: A lot  Moving to and from a bed to a chair (including a wheelchair)?: Unable  Need to walk in hospital room?: Unable  Climbing 3-5 steps with a railing?: Unable    Plan  - Progressive mobility protocol initated  - PT/OT consulted  - Fall precautions in place  - CPK normal  - awaiting further PT/OT recommendations-- therapies were unavailable due to winter weather emergency and we will hopefully resume today  PT/OT following and recommending moderate intensity therapy and pt agreeable; CM consulted for discharge planning        Acute hypoxemic respiratory failure  Patient with Hypercapnic and Hypoxic Respiratory failure which is Acute.  she is not on home oxygen. Supplemental oxygen was provided and noted- 5 L via nasal cannula   ABG  Recent Labs   Lab 01/20/25  1440   PH 7.370   PO2 20*   PCO2 50.9*   HCO3 29.4*   BE 4*     .   CXR 1/18: Cardiomegaly with no acute pulmonary process   Repeat CXR 1/20: Cardiomegaly with no acute pulmonary process   -----------------------------------  Signs/symptoms of respiratory failure include- tachypnea and increased work of breathing. Contributing diagnoses includes - CHF, COPD, and Obesity Hypoventilation Labs and images were reviewed. Patient Has recent ABG, which has been reviewed. Will treat underlying causes and adjust management of respiratory failure as follows-    ================================================  Continue supplemental O2, weaning as tolerated  Given dose of IV Solu-Medrol and also Diuresed  with IV Lasix 1/22  Continue prednisone daily, Mucinex DM 1200 b.i.d., incentive spirometry, flutter therapy, and pulmonary toiletry  Needs to mobilize,however, PT/OT has been unavailable due to current winter weather emergency and we will hopefully resume services today  Aggressive IS and acapella; supplemental oxygen; repeat CXR without effusion or consolidation; PT/OT; encouraged ambulation and OOB with meals     Anterior cerebral artery aneurysm  MRA Brain:Questionable 1.8 mm aneurysm arising from the left TABATHA A1 segment.   ===================================  Nonurgent/  Small size--> <7mm   Maintain adequate blood pressure control  Will refer to Neurosurgery for evaluation when discharged      Chronic pain syndrome  Holding chronic gabapentin and hydrocodone due to encephalopathy  Hydrocodone resumed by on-call provider for pain 1/21, and patient is started to have intermittent confusion and hydrocodone was discontinued  Continue Tylenol for pain--dose increased 1/22  Monitor closely    Acute encephalopathy  Appears to have resolved  Suspect multifactorial:  Polypharmacy, CKD, hypoxia   CT of head negative, cardiac enzymes negative, CUS bilateral carotid stenosis of 50-69%  MRI brain negative for anything acute  Encephalopathy labs negative so far  SLP consulted-- no swallowing deficits  PT/OT consulted--- recommend moderate intensity therapy  MRA of head > 50% stenosis of left internal carotid artery  cavernous segment. Questionable 1.8 mm aneurysm arising from the left TABATHA A1 segment.  Consider further evaluation with digital subtraction angiography. Developmentally diminutive or narrowed left TABATHA A1 segment.  Hydrocodone resumed 1/21 per on-call provider and noted to have intermittent confusion-and was discontinued yesterday  Mental status at baseline    Acute  kidney injury superimposed on stage 4 chronic kidney disease  JEFF is likely due to pre-renal azotemia due to dehydration. Baseline creatinine is  1.7- 1.9 . Most recent creatinine and eGFR are listed below.  Recent Labs     01/23/25  0520 01/24/25  0555 01/25/25  0718   CREATININE 1.6* 1.6* 1.6*   EGFRNORACEVR 34.3* 34.3* 34.3*        Plan  - very mild/ JEFF is resolved--creatinine at baseline  - Avoid nephrotoxins and renally dose meds for GFR listed above  - Monitor urine output, serial BMP, and adjust therapy as needed    Chronic combined systolic and diastolic heart failure  Patient is identified as having Combined Systolic and Diastolic heart failure that is Chronic. CHF is currently controlled. Latest ECHO performed and demonstrates- Results for orders placed during the hospital encounter of 12/07/24    Echo Saline Bubble? No    Interpretation Summary    Left Ventricle: The left ventricle is normal in size. Moderately increased wall thickness. Unable to assess wall motion. There is normal systolic function with a visually estimated ejection fraction of 55 - 60%. Unable to assess diastolic function due to atrial fibrillation.    Right Ventricle: Right ventricle was not well visualized due to poor acoustic window.    Left Atrium: Left atrium is severely dilated.    Right Atrium: Right atrium is severely dilated.    Aortic Valve: There is moderate to severe stenosis. Aortic valve area by VTI is 1.0 cm². Aortic valve peak velocity is 3.4 m/s. Mean gradient is 27.0 mmHg. The dimensionless index is 0.32. There is mild aortic regurgitation.    IVC/SVC: Elevated venous pressure at 15 mmHg.  . Continue Beta Blocker Aldactone Bumex  and monitor clinical status closely. Monitor on telemetry. Patient is off CHF pathway.  Monitor strict Is&Os and daily weights.  Not on fluid restriction. Cardiology is not consulted. Continue to stress to patient importance of self efficacy and  on diet for CHF. Last BNP reviewed- and  noted below   Component Ref Range & Units 13 d ago  (1/6/25)   BNP 0 - 99 pg/mL 1,065   .    ===================================  Troponin normal   Have been holding chronic diuretics due to JEFF, which has improved  Given IV Lasix x1 1/20 and 1/22 for increased oxygen demands  CXR unremarkable/no edema    Moderate aortic stenosis  Echocardiogram with evidence of aortic stenosis that is severe . The patient's most recent echocardiogram result is listed below. We will manage the valvular abnormality by monitor for volume overload    Echo Saline Bubble? No    Result Date: 12/8/2024    Left Ventricle: The left ventricle is normal in size. Moderately   increased wall thickness. Unable to assess wall motion. There is normal   systolic function with a visually estimated ejection fraction of 55 - 60%.   Unable to assess diastolic function due to atrial fibrillation.    Right Ventricle: Right ventricle was not well visualized due to poor   acoustic window.    Left Atrium: Left atrium is severely dilated.    Right Atrium: Right atrium is severely dilated.    Aortic Valve: There is moderate to severe stenosis. Aortic valve area   by VTI is 1.0 cm². Aortic valve peak velocity is 3.4 m/s. Mean gradient is   27.0 mmHg. The dimensionless index is 0.32. There is mild aortic   regurgitation.    IVC/SVC: Elevated venous pressure at 15 mmHg.        ===========================================  Following with Dr. Ghosh for valve workup  Monitor for volume overload  Cardiology consulted per family request  1/24:  Cardiology recommended decreasing amiodarone to 100 mg daily; No recommendations to address AS further while inpatient per cardiology; cardiology signed off and will be available prn      Persistent atrial fibrillation  Patient has persistent (7 days or more) atrial fibrillation. Patient is currently in atrial fibrillation. [id number] Score: 3. The patients heart rate in the last 24 hours is as follows:  Pulse  Min: 81  Max: 109      Antiarrhythmics  amiodarone tablet 200 mg, Daily, Oral  metoprolol succinate (TOPROL-XL) 24 hr tablet 50 mg, 2 times daily, Oral    Anticoagulants  apixaban tablet 5 mg, 2 times daily, Oral    Plan  - Replete lytes with a goal of K>4, Mg >2  - Patient is anticoagulated, see medications listed above.  - Patient's afib is currently controlled    Chronic obstructive pulmonary disease  Patient's COPD is with exacerbation noted by worsening of baseline hypoxia currently.  Patient is currently off COPD Pathway. Continue scheduled inhalers  nebulizers and Supplemental oxygen and monitor respiratory status closely.   ====================================  See acute hypoxic/hypercarbia respiratory failure A/P    Morbid obesity  Body mass index is 43.05 kg/m². Morbid obesity complicates all aspects of disease management from diagnostic modalities to treatment. Weight loss encouraged and health benefits explained to patient.     Anemia  Anemia is likely due to Iron deficiency and chronic disease due to Chronic Kidney Disease. Most recent hemoglobin and hematocrit are listed below.  Recent Labs     01/23/25  0520 01/25/25  0718   HGB 8.8* 9.3*   HCT 30.5* 32.0*       Plan  - Monitor serial CBC: Daily  - Transfuse PRBC if patient becomes hemodynamically unstable, symptomatic or H/H drops below 7/21.  - Patient has received 1 units of PRBCs on 1/21  - Patient's anemia is currently improving after transfusion  - continue chronic supplementation      VTE Risk Mitigation (From admission, onward)           Ordered     IP VTE HIGH RISK PATIENT  Once         01/19/25 1559     Reason for No Pharmacological VTE Prophylaxis  Once        Question:  Reasons:  Answer:  Patient is Ambulatory    01/19/25 1559     apixaban tablet 5 mg  2 times daily         01/18/25 1949                    Discharge Planning   VALERIANO: 1/28/2025     Code Status: Full Code   Medical Readiness for Discharge Date:   Discharge Plan A: Skilled Nursing Facility                 Please place Justification for DME        Danielle Hoyos PA-C  Department of Hospital Medicine   [facility]

## 2025-01-25 NOTE — ASSESSMENT & PLAN NOTE
JEFF is likely due to pre-renal azotemia due to dehydration. Baseline creatinine is  1.7- 1.9 . Most recent creatinine and eGFR are listed below.  Recent Labs     01/23/25  0520 01/24/25  0555 01/25/25  0718   CREATININE 1.6* 1.6* 1.6*   EGFRNORACEVR 34.3* 34.3* 34.3*        Plan  - very mild/ JEFF is resolved--creatinine at baseline  - Avoid nephrotoxins and renally dose meds for GFR listed above  - Monitor urine output, serial BMP, and adjust therapy as needed

## 2025-01-25 NOTE — CARE UPDATE
01/25/25 0656   Patient Assessment/Suction   Level of Consciousness (AVPU) alert   Respiratory Effort Normal;Unlabored   All Lung Fields Breath Sounds Anterior:;Lateral:;diminished;coarse   Rhythm/Pattern, Respiratory unlabored;pattern regular;depth regular   Cough Type no productive sputum   Skin Integrity   $ Wound Care Tech Time 15 min   Area Observed Left;Right;Behind ear;Nares   Skin Appearance without discoloration   PRE-TX-O2   Device (Oxygen Therapy) nasal cannula   $ Is the patient on Low Flow Oxygen? Yes   Flow (L/min) (Oxygen Therapy) 3   SpO2 (!) 93 %   Pulse Oximetry Type Intermittent   $ Pulse Oximetry - Multiple Charge Pulse Oximetry - Multiple   Pulse 90   Resp 18   Aerosol Therapy   $ Aerosol Therapy Charges Aerosol Treatment   Daily Review of Necessity (SVN) completed   Respiratory Treatment Status (SVN) given   Treatment Route (SVN) mask;oxygen   Patient Position HOB elevated   Post Treatment Assessment (SVN) breath sounds improved   Signs of Intolerance (SVN) none   Breath Sounds Post-Respiratory Treatment   Throughout All Fields Post-Treatment All Fields   Throughout All Fields Post-Treatment aeration increased   Post-treatment Heart Rate (beats/min) 91   Post-treatment Resp Rate (breaths/min) 18   Incentive Spirometer   $ Incentive Spirometer Charges done with encouragement   Incentive Spirometer Predicted Level (mL) 1500   Administration (IS) proper technique demonstrated   Number of Repetitions (IS) 10   Level Incentive Spirometer (mL) 750   Patient Tolerance (IS) fair   Vibratory PEP Therapy   $ Vibratory PEP Charges Aerobika Therapy   $ Vibratory PEP Equipment Aerobika Equipment   $ Vibratory PEP Tech Time Charges 15 min   Daily Review of Necessity (PEP Therapy) completed   Type (PEP Therapy) vibratory/oscillatory   Device (PEP Therapy) flutter   Route (PEP Therapy) mouthpiece   Breaths per Cycle (PEP Therapy) 10   Cycles (PEP Therapy) 1   PEP Duration (min) 10   Settings (PEP Therapy)  PEP 5   Patient Position (PEP Therapy) HOB elevated   Post Treatment Assessment (PEP) breath sounds unchanged   Signs of Intolerance (PEP Therapy) none   Education   $ Education Bronchodilator;15 min

## 2025-01-25 NOTE — PLAN OF CARE
01/24/25 2030   Patient Assessment/Suction   Level of Consciousness (AVPU) alert   Respiratory Effort Normal;Unlabored   Expansion/Accessory Muscles/Retractions no use of accessory muscles   All Lung Fields Breath Sounds coarse;diminished   Rhythm/Pattern, Respiratory depth regular;pattern regular;unlabored   PRE-TX-O2   Device (Oxygen Therapy) nasal cannula   Flow (L/min) (Oxygen Therapy) 2   SpO2 96 %   Pulse Oximetry Type Intermittent   $ Pulse Oximetry - Multiple Charge Pulse Oximetry - Multiple   Pulse 94   Resp 18   Aerosol Therapy   $ Aerosol Therapy Charges Aerosol Treatment   Daily Review of Necessity (SVN) completed   Respiratory Treatment Status (SVN) given   Treatment Route (SVN) mask;oxygen   Patient Position HOB elevated   Post Treatment Assessment (SVN) breath sounds improved   Signs of Intolerance (SVN) none   Incentive Spirometer   $ Incentive Spirometer Charges done with encouragement   Administration (IS) mouthpiece utilized;instruction provided, follow-up   Number of Repetitions (IS) 10   Level Incentive Spirometer (mL) 750   Vibratory PEP Therapy   $ Vibratory PEP Charges Aerobika Therapy   $ Vibratory PEP Tech Time Charges 15 min   Daily Review of Necessity (PEP Therapy) completed   Type (PEP Therapy) vibratory/oscillatory   Device (PEP Therapy) flutter   Route (PEP Therapy) mouthpiece   Breaths per Cycle (PEP Therapy) 10   Cycles (PEP Therapy) 1   Settings (PEP Therapy) PEP 5   Patient Position (PEP Therapy) HOB elevated   Post Treatment Assessment (PEP) breath sounds unchanged   Signs of Intolerance (PEP Therapy) none

## 2025-01-25 NOTE — ASSESSMENT & PLAN NOTE
Patient with Acute on chronic debility due to age-related physical debility and underlying chronic illnesses . The patient's latest AMPAC (Activity Measure for Post Acute Care) Score is listed below.    AM-PAC Score - How much help does the patient need for each activity listed  Basic Mobility Total Score: 8  Turning over in bed (including adjusting bedclothes, sheets and blankets)?: A lot  Sitting down on and standing up from a chair with arms (e.g., wheelchair, bedside commode, etc.): Unable  Moving from lying on back to sitting on the side of the bed?: A lot  Moving to and from a bed to a chair (including a wheelchair)?: Unable  Need to walk in hospital room?: Unable  Climbing 3-5 steps with a railing?: Unable    Plan  - Progressive mobility protocol initated  - PT/OT consulted  - Fall precautions in place  - CPK normal  - awaiting further PT/OT recommendations-- therapies were unavailable due to winter weather emergency and we will hopefully resume today  PT/OT following and recommending moderate intensity therapy and pt agreeable; CM consulted for discharge planning

## 2025-01-25 NOTE — ASSESSMENT & PLAN NOTE
Possibly medication induced (on hydrocodone and gabapentin) vs neurologic etiology vs cardiogenic/aortic stenosis possibly contributing  Family endorses that she did not pass out, but that her legs got weak and she went down, and patient remained awake and alert during episode.  They also endorsed that this is not an unusual/new issue for her.  =================================  CT of head negative  UA negative for infection   Last TTE 12/08/2024: EF 55-60%, indeterminate diastolic function, moderate to severe AS  MRI Brain:  Negative for anything acute  MRA of head > 50% stenosis of left internal carotid artery  cavernous segment. Questionable 1.8 mm aneurysm arising from the left TABATHA A1 segment.  Consider further evaluation with digital subtraction angiography. Developmentally diminutive or narrowed left TABATHA A1 segment.   =======================================  MRA of neck:  Pending--not obtain as of yet due to patient uncooperative  Orthostatics unable to be obtained due to current condition/ we will await PT for assistance.  PT not available since 1/21  due to winter weather emergency  PT/OT recommends moderate intensity   Maintain fall/syncope precautions  Vascular surgery evaluated pt and recommended medical management with asa, statin, and eliquis

## 2025-01-26 LAB
ALBUMIN SERPL BCP-MCNC: 3.6 G/DL (ref 3.5–5.2)
ALP SERPL-CCNC: 156 U/L (ref 55–135)
ALT SERPL W/O P-5'-P-CCNC: 18 U/L (ref 10–44)
ANION GAP SERPL CALC-SCNC: 8 MMOL/L (ref 8–16)
AST SERPL-CCNC: 19 U/L (ref 10–40)
BASOPHILS # BLD AUTO: 0.02 K/UL (ref 0–0.2)
BASOPHILS NFR BLD: 0.2 % (ref 0–1.9)
BILIRUB SERPL-MCNC: 0.8 MG/DL (ref 0.1–1)
BUN SERPL-MCNC: 30 MG/DL (ref 8–23)
CALCIUM SERPL-MCNC: 9.1 MG/DL (ref 8.7–10.5)
CHLORIDE SERPL-SCNC: 101 MMOL/L (ref 95–110)
CO2 SERPL-SCNC: 27 MMOL/L (ref 23–29)
CREAT SERPL-MCNC: 1.6 MG/DL (ref 0.5–1.4)
DIFFERENTIAL METHOD BLD: ABNORMAL
EOSINOPHIL # BLD AUTO: 0 K/UL (ref 0–0.5)
EOSINOPHIL NFR BLD: 0 % (ref 0–8)
ERYTHROCYTE [DISTWIDTH] IN BLOOD BY AUTOMATED COUNT: 18.7 % (ref 11.5–14.5)
EST. GFR  (NO RACE VARIABLE): 34.3 ML/MIN/1.73 M^2
GLUCOSE SERPL-MCNC: 100 MG/DL (ref 70–110)
HCT VFR BLD AUTO: 30.4 % (ref 37–48.5)
HGB BLD-MCNC: 8.9 G/DL (ref 12–16)
IMM GRANULOCYTES # BLD AUTO: 0.15 K/UL (ref 0–0.04)
IMM GRANULOCYTES NFR BLD AUTO: 1.4 % (ref 0–0.5)
LYMPHOCYTES # BLD AUTO: 0.8 K/UL (ref 1–4.8)
LYMPHOCYTES NFR BLD: 7.3 % (ref 18–48)
MAGNESIUM SERPL-MCNC: 1.9 MG/DL (ref 1.6–2.6)
MCH RBC QN AUTO: 26.5 PG (ref 27–31)
MCHC RBC AUTO-ENTMCNC: 29.3 G/DL (ref 32–36)
MCV RBC AUTO: 91 FL (ref 82–98)
MONOCYTES # BLD AUTO: 0.7 K/UL (ref 0.3–1)
MONOCYTES NFR BLD: 6.5 % (ref 4–15)
NEUTROPHILS # BLD AUTO: 9.3 K/UL (ref 1.8–7.7)
NEUTROPHILS NFR BLD: 84.6 % (ref 38–73)
NRBC BLD-RTO: 0 /100 WBC
PHOSPHATE SERPL-MCNC: 3.8 MG/DL (ref 2.7–4.5)
PLATELET # BLD AUTO: 312 K/UL (ref 150–450)
PMV BLD AUTO: 8.4 FL (ref 9.2–12.9)
POTASSIUM SERPL-SCNC: 3.8 MMOL/L (ref 3.5–5.1)
PROT SERPL-MCNC: 6.2 G/DL (ref 6–8.4)
RBC # BLD AUTO: 3.36 M/UL (ref 4–5.4)
SODIUM SERPL-SCNC: 136 MMOL/L (ref 136–145)
WBC # BLD AUTO: 10.96 K/UL (ref 3.9–12.7)

## 2025-01-26 PROCEDURE — 94664 DEMO&/EVAL PT USE INHALER: CPT

## 2025-01-26 PROCEDURE — 94799 UNLISTED PULMONARY SVC/PX: CPT

## 2025-01-26 PROCEDURE — 25000003 PHARM REV CODE 250: Performed by: NURSE PRACTITIONER

## 2025-01-26 PROCEDURE — 25000242 PHARM REV CODE 250 ALT 637 W/ HCPCS: Performed by: HOSPITALIST

## 2025-01-26 PROCEDURE — 94640 AIRWAY INHALATION TREATMENT: CPT

## 2025-01-26 PROCEDURE — 83735 ASSAY OF MAGNESIUM: CPT | Performed by: NURSE PRACTITIONER

## 2025-01-26 PROCEDURE — 99900035 HC TECH TIME PER 15 MIN (STAT)

## 2025-01-26 PROCEDURE — 36415 COLL VENOUS BLD VENIPUNCTURE: CPT | Performed by: NURSE PRACTITIONER

## 2025-01-26 PROCEDURE — 12000002 HC ACUTE/MED SURGE SEMI-PRIVATE ROOM

## 2025-01-26 PROCEDURE — 84100 ASSAY OF PHOSPHORUS: CPT | Performed by: NURSE PRACTITIONER

## 2025-01-26 PROCEDURE — 85025 COMPLETE CBC W/AUTO DIFF WBC: CPT

## 2025-01-26 PROCEDURE — 27000221 HC OXYGEN, UP TO 24 HOURS

## 2025-01-26 PROCEDURE — 94761 N-INVAS EAR/PLS OXIMETRY MLT: CPT

## 2025-01-26 PROCEDURE — 94799 UNLISTED PULMONARY SVC/PX: CPT | Mod: XB

## 2025-01-26 PROCEDURE — 63600175 PHARM REV CODE 636 W HCPCS

## 2025-01-26 PROCEDURE — 99900031 HC PATIENT EDUCATION (STAT)

## 2025-01-26 PROCEDURE — 80053 COMPREHEN METABOLIC PANEL: CPT | Performed by: NURSE PRACTITIONER

## 2025-01-26 PROCEDURE — 25000003 PHARM REV CODE 250

## 2025-01-26 RX ADMIN — BUDESONIDE INHALATION 0.5 MG: 0.5 SUSPENSION RESPIRATORY (INHALATION) at 07:01

## 2025-01-26 RX ADMIN — APIXABAN 5 MG: 5 TABLET, FILM COATED ORAL at 09:01

## 2025-01-26 RX ADMIN — METHOCARBAMOL TABLETS 750 MG: 750 TABLET, COATED ORAL at 03:01

## 2025-01-26 RX ADMIN — MUPIROCIN 1 G: 20 OINTMENT TOPICAL at 09:01

## 2025-01-26 RX ADMIN — ARFORMOTEROL TARTRATE 15 MCG: 15 SOLUTION RESPIRATORY (INHALATION) at 07:01

## 2025-01-26 RX ADMIN — ATORVASTATIN CALCIUM 10 MG: 10 TABLET, FILM COATED ORAL at 09:01

## 2025-01-26 RX ADMIN — MIRTAZAPINE 15 MG: 15 TABLET, FILM COATED ORAL at 09:01

## 2025-01-26 RX ADMIN — METHOCARBAMOL TABLETS 750 MG: 750 TABLET, COATED ORAL at 09:01

## 2025-01-26 RX ADMIN — APIXABAN 5 MG: 5 TABLET, FILM COATED ORAL at 10:01

## 2025-01-26 RX ADMIN — GUAIFENESIN AND DEXTROMETHORPHAN HYDROBROMIDE 2 TABLET: 600; 30 TABLET, EXTENDED RELEASE ORAL at 10:01

## 2025-01-26 RX ADMIN — PREDNISONE 40 MG: 20 TABLET ORAL at 10:01

## 2025-01-26 RX ADMIN — ASPIRIN 81 MG: 81 TABLET, COATED ORAL at 10:01

## 2025-01-26 RX ADMIN — IPRATROPIUM BROMIDE 0.5 MG: 0.5 SOLUTION RESPIRATORY (INHALATION) at 07:01

## 2025-01-26 RX ADMIN — METHOCARBAMOL TABLETS 750 MG: 750 TABLET, COATED ORAL at 10:01

## 2025-01-26 RX ADMIN — AMIODARONE HYDROCHLORIDE 100 MG: 100 TABLET ORAL at 10:01

## 2025-01-26 RX ADMIN — FLUOXETINE HYDROCHLORIDE 40 MG: 20 CAPSULE ORAL at 10:01

## 2025-01-26 RX ADMIN — IPRATROPIUM BROMIDE 0.5 MG: 0.5 SOLUTION RESPIRATORY (INHALATION) at 01:01

## 2025-01-26 RX ADMIN — BUPROPION HYDROCHLORIDE 150 MG: 150 TABLET, FILM COATED, EXTENDED RELEASE ORAL at 09:01

## 2025-01-26 RX ADMIN — BUPROPION HYDROCHLORIDE 150 MG: 150 TABLET, FILM COATED, EXTENDED RELEASE ORAL at 10:01

## 2025-01-26 RX ADMIN — GUAIFENESIN AND DEXTROMETHORPHAN HYDROBROMIDE 2 TABLET: 600; 30 TABLET, EXTENDED RELEASE ORAL at 09:01

## 2025-01-26 RX ADMIN — Medication 150 MG: at 10:01

## 2025-01-26 RX ADMIN — MUPIROCIN 1 G: 20 OINTMENT TOPICAL at 10:01

## 2025-01-26 RX ADMIN — METOPROLOL SUCCINATE 50 MG: 50 TABLET, FILM COATED, EXTENDED RELEASE ORAL at 10:01

## 2025-01-26 RX ADMIN — METOPROLOL SUCCINATE 50 MG: 50 TABLET, FILM COATED, EXTENDED RELEASE ORAL at 09:01

## 2025-01-26 NOTE — CARE UPDATE
01/26/25 0700   Patient Assessment/Suction   Level of Consciousness (AVPU) alert   Respiratory Effort Normal;Unlabored   All Lung Fields Breath Sounds Anterior:;Lateral:;clear;diminished   Rhythm/Pattern, Respiratory unlabored;depth regular;pattern regular   Cough Frequency no cough   Skin Integrity   $ Wound Care Tech Time 15 min   Area Observed Left;Right;Behind ear   Skin Appearance without discoloration   PRE-TX-O2   Device (Oxygen Therapy) nasal cannula   $ Is the patient on Low Flow Oxygen? Yes   Flow (L/min) (Oxygen Therapy) 3   SpO2 97 %   Pulse Oximetry Type Intermittent   $ Pulse Oximetry - Multiple Charge Pulse Oximetry - Multiple   Pulse 87   Resp 17   Aerosol Therapy   $ Aerosol Therapy Charges Aerosol Treatment   Daily Review of Necessity (SVN) completed   Respiratory Treatment Status (SVN) given   Treatment Route (SVN) mask;oxygen   Patient Position HOB elevated   Post Treatment Assessment (SVN) breath sounds improved   Signs of Intolerance (SVN) none   Breath Sounds Post-Respiratory Treatment   Throughout All Fields Post-Treatment All Fields   Throughout All Fields Post-Treatment aeration increased   Post-treatment Heart Rate (beats/min) 88   Post-treatment Resp Rate (breaths/min) 18   Incentive Spirometer   $ Incentive Spirometer Charges done with encouragement   Administration (IS) proper technique demonstrated   Number of Repetitions (IS) 10   Level Incentive Spirometer (mL) 500   Patient Tolerance (IS) poor   Vibratory PEP Therapy   $ Vibratory PEP Charges Aerobika Therapy   $ Vibratory PEP Equipment Aerobika Equipment   $ Vibratory PEP Tech Time Charges 15 min   Daily Review of Necessity (PEP Therapy) completed   Type (PEP Therapy) vibratory/oscillatory   Device (PEP Therapy) flutter   Route (PEP Therapy) mouthpiece   Breaths per Cycle (PEP Therapy) 10   Cycles (PEP Therapy) 1   PEP Duration (min) 10   Settings (PEP Therapy) PEP 5   Patient Position (PEP Therapy) HOB elevated   Post Treatment  Assessment (PEP) breath sounds unchanged   Signs of Intolerance (PEP Therapy) none   Education   $ Education Bronchodilator;15 min

## 2025-01-26 NOTE — ASSESSMENT & PLAN NOTE
JEFF is likely due to pre-renal azotemia due to dehydration. Baseline creatinine is  1.7- 1.9 . Most recent creatinine and eGFR are listed below.  Recent Labs     01/24/25  0555 01/25/25  0718 01/26/25  0510   CREATININE 1.6* 1.6* 1.6*   EGFRNORACEVR 34.3* 34.3* 34.3*        Plan  - very mild/ JEFF is resolved--creatinine at baseline  - Avoid nephrotoxins and renally dose meds for GFR listed above  - Monitor urine output, serial BMP, and adjust therapy as needed

## 2025-01-26 NOTE — ASSESSMENT & PLAN NOTE
Anemia is likely due to Iron deficiency and chronic disease due to Chronic Kidney Disease. Most recent hemoglobin and hematocrit are listed below.  Recent Labs     01/25/25  0718 01/26/25  0510   HGB 9.3* 8.9*   HCT 32.0* 30.4*       Plan  - Monitor serial CBC: Daily  - Transfuse PRBC if patient becomes hemodynamically unstable, symptomatic or H/H drops below 7/21.  - Patient has received 1 units of PRBCs on 1/21  - Patient's anemia is currently improving after transfusion  - continue chronic supplementation

## 2025-01-26 NOTE — PROGRESS NOTES
[facility] Medicine  Progress Note    Patient Name: Iris Mueller  MRN: [id number]  Patient Class: IP- Inpatient   Admission Date: 1/18/2025  Length of Stay: 5 days  Attending Physician: Sergey Casey DO  Primary Care Provider: Elkin You MD        Subjective     Principal Problem:Syncope        HPI:  69-year-old female with a past medical history of COPD asthma, HTN, obesity, CHF, Right knee wound infection, arthritis who presented to the emergency room for  syncope and fall.  She reports just prior to fall and she felt dizzy.  She is unaware of the events of the fall.  Patient has had frequent falls recently. She fell getting out of the shower. She is not entirely sure what happened. She is not sure why she fell. She has pain all over. Mostly complains of pain to her bilateral knees and to her chest. She says that she does not know why she fell but she was in the shower when it happened. She does not think that she slipped a she does take Norco 3 times a day and gabapentin.     In the ER, patient atrial fibrillation,t mild anemia, with H&H 8.7 and 28.1, JEFF with BUN 27 creatinine 2.2, baseline creatinine is 1.81., L dysphagia is 174, UA negative for infection CT head nonacute, right humerus x-ray nonacute, pelvic x-ray no acute fractures, right knee with diffuse soft tissue edema, chest x-ray nonacute with some cardiomegaly    Admit to hospital medicine for syncope workup consult PT/OT , JEFF    I spoke to daughter who states patient recently was seen by cardiologist who stopped 1 of her blood pressure medicines but daughter is concerned patient did not stopped taking the blood pressure medication and her blood pressure dropped    Overview/Hospital Course:  Ms. Mueller was admitted after having a fall at home.  Syncope was suspected since she was unable to recall the episode.  While here, her labs and vital signs were monitored and she was maintained on telemetry.  She  was also found to be acutely encephalopathic and somnolent. When records were reviewed, she was noted to be last admitted to the hospital 12/2024 for AMS and a CVA workup was negative for stroke at that time.  Neurology also evaluated her at that time and her symptoms were suspected to likely be due to her chronic pain medication.      Due to her somnolence, Encephalopathy labs were done and were negative.  Her chronic hydrocodone was held.  Her carotid ultrasound showed bilateral 50-69% carotid stenosis. She had worsening hypoxia requiring increased levels of supplemental O2.  She was diuresed with a dose of IV Lasix and a CXR was done which was unremarkable.  She had an MRI of the head which was negative for anything acute, and a brain MRA which was negative for stenosis and incidentally showed a > 50% stenosis of left internal carotid artery  cavernous segment. Questionable 1.8 mm aneurysm arising from the left TABATHA A1 segment, which was a non urgent finding.  MRA of the neck was ordered however patient was uncooperative, despite getting IV Ativan, and it could not be done.  SLP was consulted and no swallowing deficits were noted.  PT/OT were consulted and recommended moderate intensity therapy.  Her encephalopathy started to improve.  An EEG was initially ordered since she had persistent somnolence, however, since her mental status improved, the EEG was canceled.    Family had concern about her right shoulder due to patient complaining of significant pain and limited ability of using that arm.  An MRI of the shoulder was ordered for patient was not cooperative enough for imaging to be done.  Patient's encephalopathy was slow to improve and EEG was ordered.  She has a history of severe AS and Cardiology was consulted for an evaluation per the family request.  She had also presented with mild JEFF, which improved quickly.  She developed swelling to her arms, with the right being greater.  This was suspected to  possibly be due to either dependent edema due to not using or or trauma from the fall.  Venous ultrasound was ordered and was negative for DVT.  Compression was also applied to the arm.    She continued to require increased supplemental O2, and this was thought to be due to hypoventilatory respiratory failure, encephalopathy, underlying COPD and CHF.  ABG was done and showed hypoxia mild hypercarbia.  Her hemoglobin was noted to remain 7-7.2, and since she has a history of CHF, she was transfused 1 unit PRBC to maintain hemoglobin closer to 8. She was maintained on nebulizers.  She was given another dose of IV Lasix, and also given IV Solu-Medrol followed by oral prednisone.  Flutter therapy was added.      Interval History:  Patient was seen and examined. Overnight notes reviewed. She reports feeling about the same. She reports feeling weak, cough, fatigue, and VALERIO. She also reports arthralgias to right shoulder. Re-peat CXR obtained and showed mild to moderate right diaphragm elevation.  Aggressive IS and Acapella encouraged.  OB for meals encouraged.  Orthostatic vital signs ordered.  PT/OT following and recommending moderate intensive therapy.  Case management following for discharge planning. Patient was agreeable to SNF.     Review of Systems   Constitutional:  Positive for fatigue.   Respiratory:  Positive for cough and shortness of breath (VALERIO).    Cardiovascular:  Negative for chest pain.   Gastrointestinal:  Negative for abdominal pain and nausea.   Musculoskeletal:  Positive for arthralgias.   Skin:  Positive for color change (bruising).   Neurological:  Positive for weakness (generalized).     Objective:     Vital Signs (Most Recent):  Temp: 98 °F (36.7 °C) (01/26/25 1110)  Pulse: 79 (01/26/25 1320)  Resp: 18 (01/26/25 1320)  BP: 103/67 (01/26/25 1110)  SpO2: 95 % (01/26/25 1320) Vital Signs (24h Range):  Temp:  [96.8 °F (36 °C)-98 °F (36.7 °C)] 98 °F (36.7 °C)  Pulse:  [79-91] 79  Resp:  [17-19]  18  SpO2:  [95 %-99 %] 95 %  BP: (103-136)/(67-92) 103/67     Weight: 110.2 kg (243 lb)  Body mass index is 43.05 kg/m².    Intake/Output Summary (Last 24 hours) at 1/26/2025 1408  Last data filed at 1/26/2025 0929  Gross per 24 hour   Intake 600 ml   Output 1500 ml   Net -900 ml         Physical Exam  Vitals and nursing note reviewed.   Constitutional:       General: She is not in acute distress.     Appearance: Normal appearance. She is obese. She is ill-appearing (chroncially).   HENT:      Head: Normocephalic and atraumatic.      Mouth/Throat:      Mouth: Mucous membranes are dry.   Eyes:      Extraocular Movements: Extraocular movements intact.   Cardiovascular:      Rate and Rhythm: Normal rate. Rhythm irregular.      Heart sounds: Murmur heard.   Pulmonary:      Effort: Pulmonary effort is normal. No tachypnea or respiratory distress.      Breath sounds: Decreased breath sounds present.   Abdominal:      General: Abdomen is flat. Bowel sounds are normal.      Palpations: Abdomen is soft.      Tenderness: There is no abdominal tenderness. There is no guarding or rebound.   Musculoskeletal:      Right lower leg: No edema.      Left lower leg: No edema.      Comments: No bony tenderness to palpation of right shoulder   Neurological:      General: No focal deficit present.      Mental Status: She is alert and oriented to person, place, and time. Mental status is at baseline.   Psychiatric:         Mood and Affect: Mood normal.         Behavior: Behavior normal.             Significant Labs: All pertinent labs within the past 24 hours have been reviewed.  CBC:   Recent Labs   Lab 01/25/25  0718 01/26/25  0510   WBC 12.81* 10.96   HGB 9.3* 8.9*   HCT 32.0* 30.4*    312     CMP:   Recent Labs   Lab 01/25/25  0718 01/26/25  0510    136   K 3.5 3.8    101   CO2 27 27   GLU 73 100   BUN 27* 30*   CREATININE 1.6* 1.6*   CALCIUM 8.9 9.1   PROT 6.1 6.2   ALBUMIN 3.4* 3.6   BILITOT 0.8 0.8   ALKPHOS 137*  156*   AST 16 19   ALT 12 18   ANIONGAP 11 8     Magnesium:   Recent Labs   Lab 01/25/25  0718 01/26/25  0510   MG 1.9 1.9       Significant Imaging: I have reviewed all pertinent imaging results/findings within the past 24 hours.    CXR:  Unchanged cardiomegaly with new mild-to-moderate right diaphragm elevation or eventration.     CT shoulder:  Osteoarthritic changes involving the glenohumeral and acromioclavicular joints.     Probable small intra-articular loose bodies.     Mild soft tissue edema within the proximal right arm.     Assessment and Plan     * Syncope  Possibly medication induced (on hydrocodone and gabapentin) vs neurologic etiology vs cardiogenic/aortic stenosis possibly contributing  Family endorses that she did not pass out, but that her legs got weak and she went down, and patient remained awake and alert during episode.  They also endorsed that this is not an unusual/new issue for her.  =================================  CT of head negative  UA negative for infection   Last TTE 12/08/2024: EF 55-60%, indeterminate diastolic function, moderate to severe AS  MRI Brain:  Negative for anything acute  MRA of head > 50% stenosis of left internal carotid artery  cavernous segment. Questionable 1.8 mm aneurysm arising from the left TABATHA A1 segment.  Consider further evaluation with digital subtraction angiography. Developmentally diminutive or narrowed left TABATHA A1 segment.   =======================================  MRA of neck:  Pending--not obtain as of yet due to patient uncooperative  Orthostatics unable to be obtained due to current condition/ we will await PT for assistance.  PT not available since 1/21  due to winter weather emergency  PT/OT recommends moderate intensity   Maintain fall/syncope precautions  Vascular surgery evaluated pt and recommended medical management with asa, statin, and eliquis      Edema of both upper arms  Worsened while in the hospital, R>L, possible due to dependent  edema or trauma from her fall  Patient anticoagulated  venous ultrasound of BUE-- negative for DVT  Continue elevation and compression to right upper extremity      Debility  Patient with Acute on chronic debility due to age-related physical debility and underlying chronic illnesses . The patient's latest AMPAC (Activity Measure for Post Acute Care) Score is listed below.    AM-PAC Score - How much help does the patient need for each activity listed  Basic Mobility Total Score: 8  Turning over in bed (including adjusting bedclothes, sheets and blankets)?: A lot  Sitting down on and standing up from a chair with arms (e.g., wheelchair, bedside commode, etc.): Unable  Moving from lying on back to sitting on the side of the bed?: A lot  Moving to and from a bed to a chair (including a wheelchair)?: Unable  Need to walk in hospital room?: Unable  Climbing 3-5 steps with a railing?: Unable    Plan  - Progressive mobility protocol initated  - PT/OT consulted  - Fall precautions in place  - CPK normal  - awaiting further PT/OT recommendations-- therapies were unavailable due to winter weather emergency and we will hopefully resume today  PT/OT following and recommending moderate intensity therapy and pt agreeable; CM consulted for discharge planning        Acute hypoxemic respiratory failure  Patient with Hypercapnic and Hypoxic Respiratory failure which is Acute.  she is not on home oxygen. Supplemental oxygen was provided and noted- 5 L via nasal cannula   ABG  Recent Labs   Lab 01/20/25  1440   PH 7.370   PO2 20*   PCO2 50.9*   HCO3 29.4*   BE 4*     .   CXR 1/18: Cardiomegaly with no acute pulmonary process   Repeat CXR 1/20: Cardiomegaly with no acute pulmonary process   -----------------------------------  Signs/symptoms of respiratory failure include- tachypnea and increased work of breathing. Contributing diagnoses includes - CHF, COPD, and Obesity Hypoventilation Labs and images were reviewed. Patient Has recent  ABG, which has been reviewed. Will treat underlying causes and adjust management of respiratory failure as follows-   ================================================  Continue supplemental O2, weaning as tolerated  Given dose of IV Solu-Medrol and also Diuresed  with IV Lasix 1/22  Continue prednisone daily, Mucinex DM 1200 b.i.d., incentive spirometry, flutter therapy, and pulmonary toiletry  Needs to mobilize,however, PT/OT has been unavailable due to current winter weather emergency and we will hopefully resume services today  Aggressive IS and acapella; supplemental oxygen; repeat CXR without effusion or consolidation; PT/OT; encouraged ambulation and OOB with meals     Anterior cerebral artery aneurysm  MRA Brain:Questionable 1.8 mm aneurysm arising from the left TABATHA A1 segment.   ===================================  Nonurgent/  Small size--> <7mm   Maintain adequate blood pressure control  Will refer to Neurosurgery for evaluation when discharged      Chronic pain syndrome  Holding chronic gabapentin and hydrocodone due to encephalopathy  Hydrocodone resumed by on-call provider for pain 1/21, and patient is started to have intermittent confusion and hydrocodone was discontinued  Continue Tylenol for pain--dose increased 1/22  Monitor closely    Acute encephalopathy  Appears to have resolved  Suspect multifactorial:  Polypharmacy, CKD, hypoxia   CT of head negative, cardiac enzymes negative, CUS bilateral carotid stenosis of 50-69%  MRI brain negative for anything acute  Encephalopathy labs negative so far  SLP consulted-- no swallowing deficits  PT/OT consulted--- recommend moderate intensity therapy  MRA of head > 50% stenosis of left internal carotid artery  cavernous segment. Questionable 1.8 mm aneurysm arising from the left TABATHA A1 segment.  Consider further evaluation with digital subtraction angiography. Developmentally diminutive or narrowed left TABATHA A1 segment.  Hydrocodone resumed 1/21 per on-call  provider and noted to have intermittent confusion-and was discontinued yesterday  Mental status at baseline    Acute kidney injury superimposed on stage 4 chronic kidney disease  JEFF is likely due to pre-renal azotemia due to dehydration. Baseline creatinine is  1.7- 1.9 . Most recent creatinine and eGFR are listed below.  Recent Labs     01/24/25  0555 01/25/25  0718 01/26/25  0510   CREATININE 1.6* 1.6* 1.6*   EGFRNORACEVR 34.3* 34.3* 34.3*        Plan  - very mild/ JEFF is resolved--creatinine at baseline  - Avoid nephrotoxins and renally dose meds for GFR listed above  - Monitor urine output, serial BMP, and adjust therapy as needed    Chronic combined systolic and diastolic heart failure  Patient is identified as having Combined Systolic and Diastolic heart failure that is Chronic. CHF is currently controlled. Latest ECHO performed and demonstrates- Results for orders placed during the hospital encounter of 12/07/24    Echo Saline Bubble? No    Interpretation Summary    Left Ventricle: The left ventricle is normal in size. Moderately increased wall thickness. Unable to assess wall motion. There is normal systolic function with a visually estimated ejection fraction of 55 - 60%. Unable to assess diastolic function due to atrial fibrillation.    Right Ventricle: Right ventricle was not well visualized due to poor acoustic window.    Left Atrium: Left atrium is severely dilated.    Right Atrium: Right atrium is severely dilated.    Aortic Valve: There is moderate to severe stenosis. Aortic valve area by VTI is 1.0 cm². Aortic valve peak velocity is 3.4 m/s. Mean gradient is 27.0 mmHg. The dimensionless index is 0.32. There is mild aortic regurgitation.    IVC/SVC: Elevated venous pressure at 15 mmHg.  . Continue Beta Blocker Aldactone Bumex  and monitor clinical status closely. Monitor on telemetry. Patient is off CHF pathway.  Monitor strict Is&Os and daily weights.  Not on fluid restriction. Cardiology is not  consulted. Continue to stress to patient importance of self efficacy and  on diet for CHF. Last BNP reviewed- and noted below   Component Ref Range & Units 13 d ago  (1/6/25)   BNP 0 - 99 pg/mL 1,065   .    ===================================  Troponin normal   Have been holding chronic diuretics due to JEFF, which has improved  Given IV Lasix x1 1/20 and 1/22 for increased oxygen demands  CXR unremarkable/no edema    Moderate aortic stenosis  Echocardiogram with evidence of aortic stenosis that is severe . The patient's most recent echocardiogram result is listed below. We will manage the valvular abnormality by monitor for volume overload    Echo Saline Bubble? No    Result Date: 12/8/2024    Left Ventricle: The left ventricle is normal in size. Moderately   increased wall thickness. Unable to assess wall motion. There is normal   systolic function with a visually estimated ejection fraction of 55 - 60%.   Unable to assess diastolic function due to atrial fibrillation.    Right Ventricle: Right ventricle was not well visualized due to poor   acoustic window.    Left Atrium: Left atrium is severely dilated.    Right Atrium: Right atrium is severely dilated.    Aortic Valve: There is moderate to severe stenosis. Aortic valve area   by VTI is 1.0 cm². Aortic valve peak velocity is 3.4 m/s. Mean gradient is   27.0 mmHg. The dimensionless index is 0.32. There is mild aortic   regurgitation.    IVC/SVC: Elevated venous pressure at 15 mmHg.        ===========================================  Following with Dr. Ghosh for valve workup  Monitor for volume overload  Cardiology consulted per family request  1/24:  Cardiology recommended decreasing amiodarone to 100 mg daily; No recommendations to address AS further while inpatient per cardiology; cardiology signed off and will be available prn      Persistent atrial fibrillation  Patient has persistent (7 days or more) atrial fibrillation. Patient is currently in atrial  fibrillation. [id number] Score: 3. The patients heart rate in the last 24 hours is as follows:  Pulse  Min: 81  Max: 109     Antiarrhythmics  amiodarone tablet 200 mg, Daily, Oral  metoprolol succinate (TOPROL-XL) 24 hr tablet 50 mg, 2 times daily, Oral    Anticoagulants  apixaban tablet 5 mg, 2 times daily, Oral    Plan  - Replete lytes with a goal of K>4, Mg >2  - Patient is anticoagulated, see medications listed above.  - Patient's afib is currently controlled    Chronic obstructive pulmonary disease  Patient's COPD is with exacerbation noted by worsening of baseline hypoxia currently.  Patient is currently off COPD Pathway. Continue scheduled inhalers  nebulizers and Supplemental oxygen and monitor respiratory status closely.   ====================================  See acute hypoxic/hypercarbia respiratory failure A/P    Morbid obesity  Body mass index is 43.05 kg/m². Morbid obesity complicates all aspects of disease management from diagnostic modalities to treatment. Weight loss encouraged and health benefits explained to patient.     Anemia  Anemia is likely due to Iron deficiency and chronic disease due to Chronic Kidney Disease. Most recent hemoglobin and hematocrit are listed below.  Recent Labs     01/25/25  0718 01/26/25  0510   HGB 9.3* 8.9*   HCT 32.0* 30.4*       Plan  - Monitor serial CBC: Daily  - Transfuse PRBC if patient becomes hemodynamically unstable, symptomatic or H/H drops below 7/21.  - Patient has received 1 units of PRBCs on 1/21  - Patient's anemia is currently improving after transfusion  - continue chronic supplementation      VTE Risk Mitigation (From admission, onward)           Ordered     IP VTE HIGH RISK PATIENT  Once         01/19/25 1559     Reason for No Pharmacological VTE Prophylaxis  Once        Question:  Reasons:  Answer:  Patient is Ambulatory    01/19/25 1559     apixaban tablet 5 mg  2 times daily         01/18/25 1949                    Discharge Planning   VALERIANO:  1/27/2025     Code Status: Full Code   Medical Readiness for Discharge Date:   Discharge Plan A: Skilled Nursing Facility                Please place Justification for DME        Danielle Hoyos PA-C  Department of Hospital Medicine   [facility]

## 2025-01-26 NOTE — SUBJECTIVE & OBJECTIVE
Interval History:  Patient was seen and examined. Overnight notes reviewed. She reports feeling about the same. She reports feeling weak, cough, fatigue, and VALERIO. She also reports arthralgias to right shoulder. Re-peat CXR obtained and showed mild to moderate right diaphragm elevation.  Aggressive IS and Acapella encouraged.  OB for meals encouraged.  Orthostatic vital signs ordered.  PT/OT following and recommending moderate intensive therapy.  Case management following for discharge planning. Patient was agreeable to SNF.     Review of Systems   Constitutional:  Positive for fatigue.   Respiratory:  Positive for cough and shortness of breath (VALERIO).    Cardiovascular:  Negative for chest pain.   Gastrointestinal:  Negative for abdominal pain and nausea.   Musculoskeletal:  Positive for arthralgias.   Skin:  Positive for color change (bruising).   Neurological:  Positive for weakness (generalized).     Objective:     Vital Signs (Most Recent):  Temp: 98 °F (36.7 °C) (01/26/25 1110)  Pulse: 79 (01/26/25 1320)  Resp: 18 (01/26/25 1320)  BP: 103/67 (01/26/25 1110)  SpO2: 95 % (01/26/25 1320) Vital Signs (24h Range):  Temp:  [96.8 °F (36 °C)-98 °F (36.7 °C)] 98 °F (36.7 °C)  Pulse:  [79-91] 79  Resp:  [17-19] 18  SpO2:  [95 %-99 %] 95 %  BP: (103-136)/(67-92) 103/67     Weight: 110.2 kg (243 lb)  Body mass index is 43.05 kg/m².    Intake/Output Summary (Last 24 hours) at 1/26/2025 1408  Last data filed at 1/26/2025 0929  Gross per 24 hour   Intake 600 ml   Output 1500 ml   Net -900 ml         Physical Exam  Vitals and nursing note reviewed.   Constitutional:       General: She is not in acute distress.     Appearance: Normal appearance. She is obese. She is ill-appearing (chroncially).   HENT:      Head: Normocephalic and atraumatic.      Mouth/Throat:      Mouth: Mucous membranes are dry.   Eyes:      Extraocular Movements: Extraocular movements intact.   Cardiovascular:      Rate and Rhythm: Normal rate. Rhythm  irregular.      Heart sounds: Murmur heard.   Pulmonary:      Effort: Pulmonary effort is normal. No tachypnea or respiratory distress.      Breath sounds: Decreased breath sounds present.   Abdominal:      General: Abdomen is flat. Bowel sounds are normal.      Palpations: Abdomen is soft.      Tenderness: There is no abdominal tenderness. There is no guarding or rebound.   Musculoskeletal:      Right lower leg: No edema.      Left lower leg: No edema.      Comments: No bony tenderness to palpation of right shoulder   Neurological:      General: No focal deficit present.      Mental Status: She is alert and oriented to person, place, and time. Mental status is at baseline.   Psychiatric:         Mood and Affect: Mood normal.         Behavior: Behavior normal.             Significant Labs: All pertinent labs within the past 24 hours have been reviewed.  CBC:   Recent Labs   Lab 01/25/25  0718 01/26/25  0510   WBC 12.81* 10.96   HGB 9.3* 8.9*   HCT 32.0* 30.4*    312     CMP:   Recent Labs   Lab 01/25/25  0718 01/26/25  0510    136   K 3.5 3.8    101   CO2 27 27   GLU 73 100   BUN 27* 30*   CREATININE 1.6* 1.6*   CALCIUM 8.9 9.1   PROT 6.1 6.2   ALBUMIN 3.4* 3.6   BILITOT 0.8 0.8   ALKPHOS 137* 156*   AST 16 19   ALT 12 18   ANIONGAP 11 8     Magnesium:   Recent Labs   Lab 01/25/25  0718 01/26/25  0510   MG 1.9 1.9       Significant Imaging: I have reviewed all pertinent imaging results/findings within the past 24 hours.    CXR:  Unchanged cardiomegaly with new mild-to-moderate right diaphragm elevation or eventration.     CT shoulder:  Osteoarthritic changes involving the glenohumeral and acromioclavicular joints.     Probable small intra-articular loose bodies.     Mild soft tissue edema within the proximal right arm.    Complex Repair And Rhombic Flap Text: The defect edges were debeveled with a #15 scalpel blade.  The primary defect was closed partially with a complex linear closure.  Given the location of the remaining defect, shape of the defect and the proximity to free margins a rhombic flap was deemed most appropriate for complete closure of the defect.  Using a sterile surgical marker, an appropriate advancement flap was drawn incorporating the defect and placing the expected incisions within the relaxed skin tension lines where possible.    The area thus outlined was incised deep to adipose tissue with a #15 scalpel blade.  The skin margins were undermined to an appropriate distance in all directions utilizing iris scissors.

## 2025-01-26 NOTE — PT/OT/SLP PROGRESS
Occupational Therapy   Treatment    Name: Iris Mueller  MRN: [id number]  Admitting Diagnosis:  Syncope     The primary encounter diagnosis was JEFF (acute kidney injury). Diagnoses of Fall, Trauma, Ambulatory dysfunction, Arrhythmia, and Acute encephalopathy were also pertinent to this visit.  Pre-op Diagnosis: * No surgery found * s/p      Recommendations:     Discharge Recommendations: Moderate Intensity Therapy  Discharge Equipment Recommendations:  to be determined by next level of care  Barriers to discharge:   (increased level of assist for ADLS and fx mobility, pain)    Assessment:     Iris Mueller is a 69 y.o. female with a medical diagnosis of Syncope.  She presents with  Performance deficits affecting function are weakness, impaired endurance, impaired self care skills, impaired functional mobility, gait instability, impaired balance, pain, edema.     Rehab Prognosis:  Good; patient would benefit from acute skilled OT services to address these deficits and reach maximum level of function.       Plan:     Patient to be seen 5 x/week to address the above listed problems via self-care/home management, therapeutic activities, therapeutic exercises  Plan of Care Expires: 02/19/25  Plan of Care Reviewed with: patient    Subjective     Chief Complaint: chest /breast pain from trauma.  Patient/Family Comments/goals: pt agreeable/  just got off the BSC, I don't have to use it.  Pain/Comfort:  Pain Rating 1:  (severe pain)  Location - Side 1: Bilateral  Location - Orientation 1: anterior  Location 1: chest (breast)  Pain Addressed 1: Reposition, Pre-medicate for activity, Distraction, Cessation of Activity, Nurse notified  Pain Rating Post-Intervention 1:  (same)    Objective:     Communicated with: nurse prior to session.  Patient found HOB elevated with pulse ox (continuous), telemetry, oxygen, bed alarm, peripheral IV upon OT entry to room.    General Precautions: Standard, fall, respiratory  (syncope)    Orthopedic Precautions:N/A  Braces: N/A  Respiratory Status: Nasal cannula, flow 3 L/min     Occupational Performance:     Bed Mobility:    Patient completed Rolling to left side stand by assistance with side rails.  Patient completed Rolling to right side with minimum assistance with side rails.  Patient completed Scooting/Bridging with total assistance and 2 persons  Patient completed Supine to Sit with moderate assistance  Patient completed Sit to Supine with moderate assistance     Functional Mobility/Transfers:  Functional Mobility: pt performed seated anterior scooting on EOB with Mod A.    Activities of Daily Living:  Grooming: supervision to brush teeth and wash face seated EOB.      Treatment & Education:  Pt seen for safe self care  activities and fx mobility retraining as stated above.  All questions/concerns addressed within scope.  Call don't fall. Pt  acknowledged.  Purpose of OT and POC  PLB tech for SOB w/ exertional movement.   Pt educated on Slow pacing w/ act to prevent SOB and over exertion.     Patient left HOB elevated with all lines intact, call button in reach, bed alarm on, and nurse notified    GOALS:   Multidisciplinary Problems       Occupational Therapy Goals          Problem: Occupational Therapy    Goal Priority Disciplines Outcome Interventions   Occupational Therapy Goal     OT, PT/OT     Description: Goals to be met by: 02/19/2025     Patient will increase functional independence with ADLs by performing:    UE Dressing with Supervision.  LE Dressing with Supervision.  Grooming while standing at sink with Set-up Assistance.  Toileting from toilet with Supervision for hygiene and clothing management.   Rolling to Bilateral with Minimal Assistance.   Supine to sit with Minimal Assistance.  Toilet transfer to toilet with Supervision.                         Time Tracking:     OT Date of Treatment: 01/25/25  OT Start Time: 1420  OT Stop Time: 1445  OT Total Time (min): 25  min    Billable Minutes:Self Care/Home Management 10  Therapeutic Activity 15  Total Time 25    OT/EMI: OT          1/25/2025

## 2025-01-26 NOTE — PLAN OF CARE
01/25/25 2055   Patient Assessment/Suction   Level of Consciousness (AVPU) alert   Respiratory Effort Normal;Unlabored   Expansion/Accessory Muscles/Retractions no use of accessory muscles   All Lung Fields Breath Sounds clear;equal bilaterally   Skin Integrity   $ Wound Care Tech Time 15 min   Area Observed Right;Left;Behind ear   Skin Appearance without discoloration   PRE-TX-O2   Device (Oxygen Therapy) nasal cannula   Flow (L/min) (Oxygen Therapy) 3   SpO2 99 %   Pulse Oximetry Type Intermittent   $ Pulse Oximetry - Multiple Charge Pulse Oximetry - Multiple   Pulse 88   Resp 18   Aerosol Therapy   $ Aerosol Therapy Charges Aerosol Treatment   Daily Review of Necessity (SVN) completed   Respiratory Treatment Status (SVN) given   Treatment Route (SVN) mask;oxygen   Patient Position HOB elevated   Post Treatment Assessment (SVN) breath sounds improved   Signs of Intolerance (SVN) none   Incentive Spirometer   $ Incentive Spirometer Charges done with encouragement   Administration (IS) proper technique demonstrated   Number of Repetitions (IS) 10   Level Incentive Spirometer (mL) 500   Patient Tolerance (IS) fair   Vibratory PEP Therapy   $ Vibratory PEP Charges Acapella Therapy   $ Vibratory PEP Tech Time Charges 15 min   Daily Review of Necessity (PEP Therapy) completed   Type (PEP Therapy) vibratory/oscillatory   Device (PEP Therapy) flutter   Route (PEP Therapy) mouthpiece   Breaths per Cycle (PEP Therapy) 10   Cycles (PEP Therapy) 1   Settings (PEP Therapy) PEP 5   Patient Position (PEP Therapy) HOB elevated   Post Treatment Assessment (PEP) breath sounds unchanged   Signs of Intolerance (PEP Therapy) none   Education   $ Education Bronchodilator;15 min

## 2025-01-27 LAB
ALBUMIN SERPL BCP-MCNC: 3.4 G/DL (ref 3.5–5.2)
ALP SERPL-CCNC: 173 U/L (ref 55–135)
ALT SERPL W/O P-5'-P-CCNC: 27 U/L (ref 10–44)
ANION GAP SERPL CALC-SCNC: 9 MMOL/L (ref 8–16)
AST SERPL-CCNC: 28 U/L (ref 10–40)
BASOPHILS # BLD AUTO: 0.02 K/UL (ref 0–0.2)
BASOPHILS NFR BLD: 0.1 % (ref 0–1.9)
BILIRUB SERPL-MCNC: 0.7 MG/DL (ref 0.1–1)
BUN SERPL-MCNC: 33 MG/DL (ref 8–23)
CALCIUM SERPL-MCNC: 8.8 MG/DL (ref 8.7–10.5)
CHLORIDE SERPL-SCNC: 100 MMOL/L (ref 95–110)
CO2 SERPL-SCNC: 27 MMOL/L (ref 23–29)
CREAT SERPL-MCNC: 1.6 MG/DL (ref 0.5–1.4)
DIFFERENTIAL METHOD BLD: ABNORMAL
EOSINOPHIL # BLD AUTO: 0 K/UL (ref 0–0.5)
EOSINOPHIL NFR BLD: 0.1 % (ref 0–8)
ERYTHROCYTE [DISTWIDTH] IN BLOOD BY AUTOMATED COUNT: 18.6 % (ref 11.5–14.5)
EST. GFR  (NO RACE VARIABLE): 34.3 ML/MIN/1.73 M^2
GLUCOSE SERPL-MCNC: 89 MG/DL (ref 70–110)
HCT VFR BLD AUTO: 29.8 % (ref 37–48.5)
HGB BLD-MCNC: 8.7 G/DL (ref 12–16)
IMM GRANULOCYTES # BLD AUTO: 0.27 K/UL (ref 0–0.04)
IMM GRANULOCYTES NFR BLD AUTO: 1.9 % (ref 0–0.5)
LACTATE SERPL-SCNC: 1.4 MMOL/L (ref 0.5–1.9)
LYMPHOCYTES # BLD AUTO: 1.1 K/UL (ref 1–4.8)
LYMPHOCYTES NFR BLD: 8 % (ref 18–48)
MAGNESIUM SERPL-MCNC: 1.9 MG/DL (ref 1.6–2.6)
MCH RBC QN AUTO: 26.7 PG (ref 27–31)
MCHC RBC AUTO-ENTMCNC: 29.2 G/DL (ref 32–36)
MCV RBC AUTO: 91 FL (ref 82–98)
MONOCYTES # BLD AUTO: 1.1 K/UL (ref 0.3–1)
MONOCYTES NFR BLD: 8 % (ref 4–15)
NEUTROPHILS # BLD AUTO: 11.6 K/UL (ref 1.8–7.7)
NEUTROPHILS NFR BLD: 81.9 % (ref 38–73)
NRBC BLD-RTO: 0 /100 WBC
PHOSPHATE SERPL-MCNC: 3.7 MG/DL (ref 2.7–4.5)
PLATELET # BLD AUTO: 350 K/UL (ref 150–450)
PMV BLD AUTO: 8.8 FL (ref 9.2–12.9)
POTASSIUM SERPL-SCNC: 3.5 MMOL/L (ref 3.5–5.1)
PROCALCITONIN SERPL IA-MCNC: 0.25 NG/ML (ref 0–0.5)
PROT SERPL-MCNC: 5.9 G/DL (ref 6–8.4)
RBC # BLD AUTO: 3.26 M/UL (ref 4–5.4)
SODIUM SERPL-SCNC: 136 MMOL/L (ref 136–145)
WBC # BLD AUTO: 14.22 K/UL (ref 3.9–12.7)

## 2025-01-27 PROCEDURE — 36415 COLL VENOUS BLD VENIPUNCTURE: CPT | Performed by: NURSE PRACTITIONER

## 2025-01-27 PROCEDURE — 99900031 HC PATIENT EDUCATION (STAT)

## 2025-01-27 PROCEDURE — 25000003 PHARM REV CODE 250

## 2025-01-27 PROCEDURE — 63600175 PHARM REV CODE 636 W HCPCS

## 2025-01-27 PROCEDURE — 94664 DEMO&/EVAL PT USE INHALER: CPT

## 2025-01-27 PROCEDURE — 27000221 HC OXYGEN, UP TO 24 HOURS

## 2025-01-27 PROCEDURE — 85025 COMPLETE CBC W/AUTO DIFF WBC: CPT

## 2025-01-27 PROCEDURE — 94640 AIRWAY INHALATION TREATMENT: CPT

## 2025-01-27 PROCEDURE — 99900035 HC TECH TIME PER 15 MIN (STAT)

## 2025-01-27 PROCEDURE — 83735 ASSAY OF MAGNESIUM: CPT | Performed by: NURSE PRACTITIONER

## 2025-01-27 PROCEDURE — 94761 N-INVAS EAR/PLS OXIMETRY MLT: CPT

## 2025-01-27 PROCEDURE — 36415 COLL VENOUS BLD VENIPUNCTURE: CPT

## 2025-01-27 PROCEDURE — 97535 SELF CARE MNGMENT TRAINING: CPT

## 2025-01-27 PROCEDURE — 25000003 PHARM REV CODE 250: Performed by: NURSE PRACTITIONER

## 2025-01-27 PROCEDURE — 84145 PROCALCITONIN (PCT): CPT

## 2025-01-27 PROCEDURE — 25000242 PHARM REV CODE 250 ALT 637 W/ HCPCS: Performed by: HOSPITALIST

## 2025-01-27 PROCEDURE — 83605 ASSAY OF LACTIC ACID: CPT

## 2025-01-27 PROCEDURE — 84100 ASSAY OF PHOSPHORUS: CPT | Performed by: NURSE PRACTITIONER

## 2025-01-27 PROCEDURE — 97530 THERAPEUTIC ACTIVITIES: CPT

## 2025-01-27 PROCEDURE — 80053 COMPREHEN METABOLIC PANEL: CPT | Performed by: NURSE PRACTITIONER

## 2025-01-27 PROCEDURE — 12000002 HC ACUTE/MED SURGE SEMI-PRIVATE ROOM

## 2025-01-27 PROCEDURE — 94799 UNLISTED PULMONARY SVC/PX: CPT | Mod: XB

## 2025-01-27 RX ORDER — BUMETANIDE 1 MG/1
1 TABLET ORAL DAILY
Status: DISCONTINUED | OUTPATIENT
Start: 2025-01-27 | End: 2025-01-28 | Stop reason: HOSPADM

## 2025-01-27 RX ORDER — PREDNISONE 20 MG/1
20 TABLET ORAL DAILY
Status: DISCONTINUED | OUTPATIENT
Start: 2025-01-28 | End: 2025-01-28 | Stop reason: HOSPADM

## 2025-01-27 RX ORDER — CEFTRIAXONE 1 G/1
1 INJECTION, POWDER, FOR SOLUTION INTRAMUSCULAR; INTRAVENOUS
Status: DISCONTINUED | OUTPATIENT
Start: 2025-01-27 | End: 2025-01-28 | Stop reason: HOSPADM

## 2025-01-27 RX ADMIN — BENZONATATE 100 MG: 100 CAPSULE ORAL at 09:01

## 2025-01-27 RX ADMIN — METHOCARBAMOL TABLETS 750 MG: 750 TABLET, COATED ORAL at 02:01

## 2025-01-27 RX ADMIN — APIXABAN 5 MG: 5 TABLET, FILM COATED ORAL at 09:01

## 2025-01-27 RX ADMIN — GUAIFENESIN AND DEXTROMETHORPHAN HYDROBROMIDE 2 TABLET: 600; 30 TABLET, EXTENDED RELEASE ORAL at 10:01

## 2025-01-27 RX ADMIN — METOPROLOL SUCCINATE 50 MG: 50 TABLET, FILM COATED, EXTENDED RELEASE ORAL at 09:01

## 2025-01-27 RX ADMIN — IPRATROPIUM BROMIDE 0.5 MG: 0.5 SOLUTION RESPIRATORY (INHALATION) at 07:01

## 2025-01-27 RX ADMIN — MIRTAZAPINE 15 MG: 15 TABLET, FILM COATED ORAL at 09:01

## 2025-01-27 RX ADMIN — MUPIROCIN 1 G: 20 OINTMENT TOPICAL at 09:01

## 2025-01-27 RX ADMIN — AMIODARONE HYDROCHLORIDE 100 MG: 100 TABLET ORAL at 09:01

## 2025-01-27 RX ADMIN — BUMETANIDE 1 MG: 1 TABLET ORAL at 02:01

## 2025-01-27 RX ADMIN — BUPROPION HYDROCHLORIDE 150 MG: 150 TABLET, FILM COATED, EXTENDED RELEASE ORAL at 09:01

## 2025-01-27 RX ADMIN — METHOCARBAMOL TABLETS 750 MG: 750 TABLET, COATED ORAL at 09:01

## 2025-01-27 RX ADMIN — ATORVASTATIN CALCIUM 10 MG: 10 TABLET, FILM COATED ORAL at 09:01

## 2025-01-27 RX ADMIN — ASPIRIN 81 MG: 81 TABLET, COATED ORAL at 09:01

## 2025-01-27 RX ADMIN — PREDNISONE 40 MG: 20 TABLET ORAL at 09:01

## 2025-01-27 RX ADMIN — BUDESONIDE INHALATION 0.5 MG: 0.5 SUSPENSION RESPIRATORY (INHALATION) at 07:01

## 2025-01-27 RX ADMIN — ACETAMINOPHEN 650 MG: 325 TABLET ORAL at 09:01

## 2025-01-27 RX ADMIN — IPRATROPIUM BROMIDE 0.5 MG: 0.5 SOLUTION RESPIRATORY (INHALATION) at 02:01

## 2025-01-27 RX ADMIN — ARFORMOTEROL TARTRATE 15 MCG: 15 SOLUTION RESPIRATORY (INHALATION) at 07:01

## 2025-01-27 RX ADMIN — DOXYCYCLINE: 100 INJECTION, POWDER, LYOPHILIZED, FOR SOLUTION INTRAVENOUS at 03:01

## 2025-01-27 RX ADMIN — CEFTRIAXONE 1 G: 1 INJECTION, POWDER, FOR SOLUTION INTRAMUSCULAR; INTRAVENOUS at 02:01

## 2025-01-27 RX ADMIN — FLUOXETINE HYDROCHLORIDE 40 MG: 20 CAPSULE ORAL at 09:01

## 2025-01-27 RX ADMIN — Medication 150 MG: at 09:01

## 2025-01-27 RX ADMIN — GUAIFENESIN AND DEXTROMETHORPHAN HYDROBROMIDE 2 TABLET: 600; 30 TABLET, EXTENDED RELEASE ORAL at 09:01

## 2025-01-27 NOTE — ASSESSMENT & PLAN NOTE
Anemia is likely due to Iron deficiency and chronic disease due to Chronic Kidney Disease. Most recent hemoglobin and hematocrit are listed below.  Recent Labs     01/25/25  0718 01/26/25  0510 01/27/25  0621   HGB 9.3* 8.9* 8.7*   HCT 32.0* 30.4* 29.8*       Plan  - Monitor serial CBC: Daily  - Transfuse PRBC if patient becomes hemodynamically unstable, symptomatic or H/H drops below 7/21.  - Patient has received 1 units of PRBCs on 1/21  - Patient's anemia is currently improving after transfusion  - continue chronic supplementation

## 2025-01-27 NOTE — ASSESSMENT & PLAN NOTE
Patient with Hypercapnic and Hypoxic Respiratory failure which is Acute.  she is not on home oxygen. Supplemental oxygen was provided and noted- 5 L via nasal cannula   ABG  Recent Labs   Lab 01/20/25  1440   PH 7.370   PO2 20*   PCO2 50.9*   HCO3 29.4*   BE 4*     .   CXR 1/18: Cardiomegaly with no acute pulmonary process   Repeat CXR 1/20: Cardiomegaly with no acute pulmonary process   -----------------------------------  Signs/symptoms of respiratory failure include- tachypnea and increased work of breathing. Contributing diagnoses includes - CHF, COPD, and Obesity Hypoventilation Labs and images were reviewed. Patient Has recent ABG, which has been reviewed. Will treat underlying causes and adjust management of respiratory failure as follows-   ================================================  Continue supplemental O2, weaning as tolerated  Given dose of IV Solu-Medrol and also Diuresed  with IV Lasix 1/22  Continue prednisone daily, Mucinex DM 1200 b.i.d., incentive spirometry, flutter therapy, and pulmonary toiletry  Needs to mobilize,however, PT/OT has been unavailable due to current winter weather emergency and we will hopefully resume services today  Aggressive IS and acapella; supplemental oxygen; repeat CXR without effusion or consolidation; PT/OT; encouraged ambulation and OOB with meals   1/27:  Leukocytosis noted on a.m. labs; patient with continued productive cough; repeat chest x-ray concerning for atypical infection versus edema; home Bumex reordered; broke out ordered; lactate WNL; Rocephin and doxycycline ordered; continue aggressive IS

## 2025-01-27 NOTE — PT/OT/SLP PROGRESS
Occupational Therapy   Treatment    Name: Iris Mueller  MRN: [id number]  Admitting Diagnosis:  Syncope     The primary encounter diagnosis was JEFF (acute kidney injury). Diagnoses of Fall, Trauma, Ambulatory dysfunction, Arrhythmia, and Acute encephalopathy were also pertinent to this visit.    Recommendations:     Discharge Recommendations: Moderate Intensity Therapy  Discharge Equipment Recommendations:  to be determined by next level of care  Barriers to discharge:   (increased level of assist for ADLS and fx mobility, pain)    Assessment:     Iris Mueller is a 69 y.o. female with a medical diagnosis of Syncope.  She presents with pain under arms, around back to chest she says from fall and coughing all night long. Performance deficits affecting function are weakness, impaired endurance, impaired self care skills, impaired functional mobility, gait instability, impaired balance, pain, edema.     Rehab Prognosis:  Good; patient would benefit from acute skilled OT services to address these deficits and reach maximum level of function.       Plan:     Patient to be seen 5 x/week to address the above listed problems via self-care/home management, therapeutic activities, therapeutic exercises  Plan of Care Expires: 02/19/25  Plan of Care Reviewed with: patient    Subjective     Chief Complaint: pain  Patient/Family Comments/goals: pt agreeable. I been on BSC already.  Pain/Comfort:  Pain Rating 1:  (c/o intense pain w/ inhalation from coughing  all night)  Location - Side 1: Bilateral  Location - Orientation 1: generalized  Location 1: chest (underneath arms, around back and chest)  Pain Addressed 1: Reposition, Distraction  Pain Rating Post-Intervention 1:  (not rated)    Objective:     Communicated with: nurse prior to session.  Patient found HOB elevated with peripheral IV, PureWick, telemetry, bed alarm upon OT entry to room.    General Precautions: Standard, fall (syncope)    Orthopedic  "Precautions:N/A  Braces: N/A  Respiratory Status: Nasal cannula, flow 2 L/min     Occupational Performance:     Bed Mobility:    Patient completed Scooting/Bridging with moderate assistance  Patient completed Supine to Sit with minimum assistance, with side rail, and HOB elevated.   Patient completed Sit to Supine with moderate assistance and for BLE lifting assist onto bed      Functional Mobility/Transfers:  Patient completed Sit <> Stand Transfer with contact guard assistance and minimum assistance  with  rolling walker and bed raised. 2 trials.    Functional Mobility: side stepped CGA w/ RW 5 steps to left toward HOB    Activities of Daily Living:  Grooming: supervision to brush hair seated EOB. Declined other tasks already did prior.     Therapeutic Activity:   Pt stood 2 trials: First 30 sec and second 1'10" seconds w/ CGA and RW. Mild SOB. PLB cues provided to control breathing .       Treatment & Education:  Pt seen for safe self care  activities and fx mobility retraining as stated above.  All questions/concerns addressed within scope.  Call don't fall. Pt  acknowledged.  PLB tech, Energy conservation ed.    Patient left HOB elevated with all lines intact, call button in reach, and bed alarm on    GOALS:   Multidisciplinary Problems       Occupational Therapy Goals          Problem: Occupational Therapy    Goal Priority Disciplines Outcome Interventions   Occupational Therapy Goal     OT, PT/OT     Description: Goals to be met by: 02/19/2025     Patient will increase functional independence with ADLs by performing:    UE Dressing with Supervision.  LE Dressing with Supervision.  Grooming while standing at sink with Set-up Assistance.  Toileting from toilet with Supervision for hygiene and clothing management.   Rolling to Bilateral with Minimal Assistance.   Supine to sit with Minimal Assistance.  Toilet transfer to toilet with Supervision.                         Time Tracking:     OT Date of Treatment: " 01/27/25  OT Start Time: 1533  OT Stop Time: 1603  OT Total Time (min): 30 min    Billable Minutes:Self Care/Home Management 8  Therapeutic Activity 22  Total Time 30    OT/EMI: OT          1/27/2025

## 2025-01-27 NOTE — PLAN OF CARE
Spoke with Katherine at [facility] to check status of auth. Per Katherine auth has not been built yet, but she states she will contact her auth coordinator and ask for it to be expedited.       01/27/25 0922   Post-Acute Status   Post-Acute Authorization Other;Placement   Post-Acute Placement Status Pending payor review/awaiting authorization (if required)   Other Status See Comments

## 2025-01-27 NOTE — ASSESSMENT & PLAN NOTE
JEFF is likely due to pre-renal azotemia due to dehydration. Baseline creatinine is  1.7- 1.9 . Most recent creatinine and eGFR are listed below.  Recent Labs     01/25/25  0718 01/26/25  0510 01/27/25  0621   CREATININE 1.6* 1.6* 1.6*   EGFRNORACEVR 34.3* 34.3* 34.3*        Plan  - very mild/ JEFF is resolved--creatinine at baseline  - Avoid nephrotoxins and renally dose meds for GFR listed above  - Monitor urine output, serial BMP, and adjust therapy as needed  - home Bumex reordered as renal function is stable

## 2025-01-27 NOTE — SUBJECTIVE & OBJECTIVE
Interval History:  Patient was seen and examined. Overnight notes reviewed. She reports feeling weak, cough, fatigue, and VALERIO.  WBC noted to be increased on a.m. labs.  Repeat chest x-ray obtained and concerning for atypical infection.  Protocol ordered.  lactate WNL.  Rocephin and doxycycline ordered.  Aggressive IS and Acapella encouraged.  Daughter at bedside and updated on plan of care. OOB for meals encouraged.  Orthostatic vital signs negative.  Home Bumex reordered as renal function is stable.  PT/OT following and recommending moderate intensive therapy.  Case management following for discharge planning. Patient was agreeable to SNF.     Review of Systems   Constitutional:  Positive for fatigue.   Respiratory:  Positive for cough and shortness of breath (VALERIO).    Cardiovascular:  Negative for chest pain.   Gastrointestinal:  Negative for abdominal pain and nausea.   Musculoskeletal:  Positive for arthralgias.   Skin:  Positive for color change (bruising).   Neurological:  Positive for weakness (generalized).     Objective:     Vital Signs (Most Recent):  Temp: 97.6 °F (36.4 °C) (01/27/25 1141)  Pulse: 88 (01/27/25 1141)  Resp: 20 (01/27/25 0910)  BP: 134/79 (01/27/25 1141)  SpO2: (!) 91 % (01/27/25 1141) Vital Signs (24h Range):  Temp:  [96.7 °F (35.9 °C)-98.5 °F (36.9 °C)] 97.6 °F (36.4 °C)  Pulse:  [73-96] 88  Resp:  [18-20] 20  SpO2:  [91 %-100 %] 91 %  BP: ()/(59-92) 134/79     Weight: 110.2 kg (243 lb)  Body mass index is 43.05 kg/m².    Intake/Output Summary (Last 24 hours) at 1/27/2025 1352  Last data filed at 1/27/2025 0221  Gross per 24 hour   Intake 500 ml   Output 50 ml   Net 450 ml         Physical Exam  Vitals and nursing note reviewed.   Constitutional:       General: She is not in acute distress.     Appearance: Normal appearance. She is obese. She is ill-appearing (chroncially).   HENT:      Head: Normocephalic and atraumatic.      Mouth/Throat:      Mouth: Mucous membranes are dry.    Eyes:      Extraocular Movements: Extraocular movements intact.   Cardiovascular:      Rate and Rhythm: Normal rate. Rhythm irregular.      Heart sounds: Murmur heard.   Pulmonary:      Effort: Pulmonary effort is normal. No tachypnea or respiratory distress.      Breath sounds: Decreased breath sounds and rales present.   Abdominal:      General: Abdomen is flat. Bowel sounds are normal.      Palpations: Abdomen is soft.      Tenderness: There is no abdominal tenderness. There is no guarding or rebound.   Musculoskeletal:      Right lower leg: No edema.      Left lower leg: No edema.      Comments: No bony tenderness to palpation of right shoulder   Neurological:      General: No focal deficit present.      Mental Status: She is alert and oriented to person, place, and time. Mental status is at baseline.   Psychiatric:         Mood and Affect: Mood normal.         Behavior: Behavior normal.             Significant Labs: All pertinent labs within the past 24 hours have been reviewed.  CBC:   Recent Labs   Lab 01/26/25  0510 01/27/25  0621   WBC 10.96 14.22*   HGB 8.9* 8.7*   HCT 30.4* 29.8*    350     CMP:   Recent Labs   Lab 01/26/25  0510 01/27/25  0621    136   K 3.8 3.5    100   CO2 27 27    89   BUN 30* 33*   CREATININE 1.6* 1.6*   CALCIUM 9.1 8.8   PROT 6.2 5.9*   ALBUMIN 3.6 3.4*   BILITOT 0.8 0.7   ALKPHOS 156* 173*   AST 19 28   ALT 18 27   ANIONGAP 8 9     Magnesium:   Recent Labs   Lab 01/26/25  0510 01/27/25  0621   MG 1.9 1.9       Significant Imaging: I have reviewed all pertinent imaging results/findings within the past 24 hours.    CXR 1/27:   Slight interval worsening, mild interstitial opacity at the lung bases suggesting a combination of mild interstitial edema, atypical infection/viral pneumonia, and/or bronchitis in the appropriate clinical setting.     CXR:  Unchanged cardiomegaly with new mild-to-moderate right diaphragm elevation or eventration.     CT  shoulder:  Osteoarthritic changes involving the glenohumeral and acromioclavicular joints.     Probable small intra-articular loose bodies.     Mild soft tissue edema within the proximal right arm.

## 2025-01-27 NOTE — ASSESSMENT & PLAN NOTE
Possibly medication induced (on hydrocodone and gabapentin) vs neurologic etiology vs cardiogenic/aortic stenosis possibly contributing  Family endorses that she did not pass out, but that her legs got weak and she went down, and patient remained awake and alert during episode.  They also endorsed that this is not an unusual/new issue for her.  =================================  CT of head negative  UA negative for infection   Last TTE 12/08/2024: EF 55-60%, indeterminate diastolic function, moderate to severe AS  MRI Brain:  Negative for anything acute  MRA of head > 50% stenosis of left internal carotid artery  cavernous segment. Questionable 1.8 mm aneurysm arising from the left TABATHA A1 segment.  Consider further evaluation with digital subtraction angiography. Developmentally diminutive or narrowed left TABATHA A1 segment.   =======================================  MRA of neck:  Pending--not obtain as of yet due to patient uncooperative  Orthostatics unable to be obtained due to current condition/ we will await PT for assistance.  PT not available since 1/21  due to winter weather emergency  PT/OT recommends moderate intensity   Maintain fall/syncope precautions  Orthostatic vitals negative  Vascular surgery evaluated pt and recommended medical management with asa, statin, and eliquis

## 2025-01-27 NOTE — PROGRESS NOTES
Critical access hospital Medicine  Progress Note    Patient Name: Iris Mueller  MRN: [id number]  Patient Class: IP- Inpatient   Admission Date: 1/18/2025  Length of Stay: 6 days  Attending Physician: Sergey Casey DO  Primary Care Provider: Elkin You MD        Subjective     Principal Problem:Syncope        HPI:  69-year-old female with a past medical history of COPD asthma, HTN, obesity, CHF, Right knee wound infection, arthritis who presented to the emergency room for  syncope and fall.  She reports just prior to fall and she felt dizzy.  She is unaware of the events of the fall.  Patient has had frequent falls recently. She fell getting out of the shower. She is not entirely sure what happened. She is not sure why she fell. She has pain all over. Mostly complains of pain to her bilateral knees and to her chest. She says that she does not know why she fell but she was in the shower when it happened. She does not think that she slipped a she does take Norco 3 times a day and gabapentin.     In the ER, patient atrial fibrillation,t mild anemia, with H&H 8.7 and 28.1, JEFF with BUN 27 creatinine 2.2, baseline creatinine is 1.81., L dysphagia is 174, UA negative for infection CT head nonacute, right humerus x-ray nonacute, pelvic x-ray no acute fractures, right knee with diffuse soft tissue edema, chest x-ray nonacute with some cardiomegaly    Admit to hospital medicine for syncope workup consult PT/OT , JEFF    I spoke to daughter who states patient recently was seen by cardiologist who stopped 1 of her blood pressure medicines but daughter is concerned patient did not stopped taking the blood pressure medication and her blood pressure dropped    Overview/Hospital Course:  Ms. Mueller was admitted after having a fall at home.  Syncope was suspected since she was unable to recall the episode.  While here, her labs and vital signs were monitored and she was maintained on telemetry.  She  was also found to be acutely encephalopathic and somnolent. When records were reviewed, she was noted to be last admitted to the hospital 12/2024 for AMS and a CVA workup was negative for stroke at that time.  Neurology also evaluated her at that time and her symptoms were suspected to likely be due to her chronic pain medication.      Due to her somnolence, Encephalopathy labs were done and were negative.  Her chronic hydrocodone was held.  Her carotid ultrasound showed bilateral 50-69% carotid stenosis. She had worsening hypoxia requiring increased levels of supplemental O2.  She was diuresed with a dose of IV Lasix and a CXR was done which was unremarkable.  She had an MRI of the head which was negative for anything acute, and a brain MRA which was negative for stenosis and incidentally showed a > 50% stenosis of left internal carotid artery  cavernous segment. Questionable 1.8 mm aneurysm arising from the left TABATHA A1 segment, which was a non urgent finding.  MRA of the neck was ordered however patient was uncooperative, despite getting IV Ativan, and it could not be done.  Vascular surgery was consulted for bilateral carotid stenosis who recommended medical management with aspirin and follow up outpatient. SLP was consulted and no swallowing deficits were noted.  PT/OT were consulted and recommended moderate intensity therapy.  Her encephalopathy started to improve.  An EEG was initially ordered since she had persistent somnolence, however, since her mental status improved, the EEG was canceled.  Mental status returned to baseline.    Family had concern about her right shoulder due to patient complaining of significant pain and limited ability of using that arm.  An MRI of the shoulder was ordered for patient was not cooperative enough for imaging to be done.  CT shoulder was obtained which showed osteoarthritic changes involving the glenohumeral and acromioclavicular joints without evidence of fracture or  dislocation.     She has a history of severe AS and Cardiology was consulted for an evaluation per the family request.  She had also presented with mild JEFF, which improved quickly. She developed swelling to her arms, with the right being greater.  This was suspected to possibly be due to either dependent edema due to not using or or trauma from the fall.  Venous ultrasound was ordered and was negative for DVT.  Compression was also applied to the arm.    She continued to require increased supplemental O2, and this was thought to be due to hypoventilatory respiratory failure, encephalopathy, underlying COPD and CHF.  ABG was done and showed hypoxia mild hypercarbia.  Her hemoglobin was noted to remain 7-7.2, and since she has a history of CHF, she was transfused 1 unit PRBC to maintain hemoglobin closer to 8. She was maintained on nebulizers.  She was given another dose of IV Lasix, and also given IV Solu-Medrol followed by oral prednisone.  Flutter therapy was added.  Repeat chest x-ray was obtained and did not demonstrate of consolidation or effusion.  Supplemental oxygen continued to be weaned.  Aggressive IS and Acapella were encouraged.      Interval History:  Patient was seen and examined. Overnight notes reviewed. She reports feeling weak, cough, fatigue, and VALERIO.  WBC noted to be increased on a.m. labs.  Repeat chest x-ray obtained and concerning for atypical infection.  Protocol ordered.  lactate WNL.  Rocephin and doxycycline ordered.  Aggressive IS and Acapella encouraged.  Daughter at bedside and updated on plan of care. OOB for meals encouraged.  Orthostatic vital signs negative.  Home Bumex reordered as renal function is stable.  PT/OT following and recommending moderate intensive therapy.  Case management following for discharge planning. Patient was agreeable to SNF.     Review of Systems   Constitutional:  Positive for fatigue.   Respiratory:  Positive for cough and shortness of breath (VALERIO).     Cardiovascular:  Negative for chest pain.   Gastrointestinal:  Negative for abdominal pain and nausea.   Musculoskeletal:  Positive for arthralgias.   Skin:  Positive for color change (bruising).   Neurological:  Positive for weakness (generalized).     Objective:     Vital Signs (Most Recent):  Temp: 97.6 °F (36.4 °C) (01/27/25 1141)  Pulse: 88 (01/27/25 1141)  Resp: 20 (01/27/25 0910)  BP: 134/79 (01/27/25 1141)  SpO2: (!) 91 % (01/27/25 1141) Vital Signs (24h Range):  Temp:  [96.7 °F (35.9 °C)-98.5 °F (36.9 °C)] 97.6 °F (36.4 °C)  Pulse:  [73-96] 88  Resp:  [18-20] 20  SpO2:  [91 %-100 %] 91 %  BP: ()/(59-92) 134/79     Weight: 110.2 kg (243 lb)  Body mass index is 43.05 kg/m².    Intake/Output Summary (Last 24 hours) at 1/27/2025 1352  Last data filed at 1/27/2025 0221  Gross per 24 hour   Intake 500 ml   Output 50 ml   Net 450 ml         Physical Exam  Vitals and nursing note reviewed.   Constitutional:       General: She is not in acute distress.     Appearance: Normal appearance. She is obese. She is ill-appearing (chroncially).   HENT:      Head: Normocephalic and atraumatic.      Mouth/Throat:      Mouth: Mucous membranes are dry.   Eyes:      Extraocular Movements: Extraocular movements intact.   Cardiovascular:      Rate and Rhythm: Normal rate. Rhythm irregular.      Heart sounds: Murmur heard.   Pulmonary:      Effort: Pulmonary effort is normal. No tachypnea or respiratory distress.      Breath sounds: Decreased breath sounds and rales present.   Abdominal:      General: Abdomen is flat. Bowel sounds are normal.      Palpations: Abdomen is soft.      Tenderness: There is no abdominal tenderness. There is no guarding or rebound.   Musculoskeletal:      Right lower leg: No edema.      Left lower leg: No edema.      Comments: No bony tenderness to palpation of right shoulder   Neurological:      General: No focal deficit present.      Mental Status: She is alert and oriented to person, place, and  time. Mental status is at baseline.   Psychiatric:         Mood and Affect: Mood normal.         Behavior: Behavior normal.             Significant Labs: All pertinent labs within the past 24 hours have been reviewed.  CBC:   Recent Labs   Lab 01/26/25  0510 01/27/25  0621   WBC 10.96 14.22*   HGB 8.9* 8.7*   HCT 30.4* 29.8*    350     CMP:   Recent Labs   Lab 01/26/25  0510 01/27/25  0621    136   K 3.8 3.5    100   CO2 27 27    89   BUN 30* 33*   CREATININE 1.6* 1.6*   CALCIUM 9.1 8.8   PROT 6.2 5.9*   ALBUMIN 3.6 3.4*   BILITOT 0.8 0.7   ALKPHOS 156* 173*   AST 19 28   ALT 18 27   ANIONGAP 8 9     Magnesium:   Recent Labs   Lab 01/26/25  0510 01/27/25 0621   MG 1.9 1.9       Significant Imaging: I have reviewed all pertinent imaging results/findings within the past 24 hours.    CXR 1/27:   Slight interval worsening, mild interstitial opacity at the lung bases suggesting a combination of mild interstitial edema, atypical infection/viral pneumonia, and/or bronchitis in the appropriate clinical setting.     CXR:  Unchanged cardiomegaly with new mild-to-moderate right diaphragm elevation or eventration.     CT shoulder:  Osteoarthritic changes involving the glenohumeral and acromioclavicular joints.     Probable small intra-articular loose bodies.     Mild soft tissue edema within the proximal right arm.     Assessment and Plan     * Syncope  Possibly medication induced (on hydrocodone and gabapentin) vs neurologic etiology vs cardiogenic/aortic stenosis possibly contributing  Family endorses that she did not pass out, but that her legs got weak and she went down, and patient remained awake and alert during episode.  They also endorsed that this is not an unusual/new issue for her.  =================================  CT of head negative  UA negative for infection   Last TTE 12/08/2024: EF 55-60%, indeterminate diastolic function, moderate to severe AS  MRI Brain:  Negative for anything  acute  MRA of head > 50% stenosis of left internal carotid artery  cavernous segment. Questionable 1.8 mm aneurysm arising from the left TABATHA A1 segment.  Consider further evaluation with digital subtraction angiography. Developmentally diminutive or narrowed left TABATHA A1 segment.   =======================================  MRA of neck:  Pending--not obtain as of yet due to patient uncooperative  Orthostatics unable to be obtained due to current condition/ we will await PT for assistance.  PT not available since 1/21  due to winter weather emergency  PT/OT recommends moderate intensity   Maintain fall/syncope precautions  Orthostatic vitals negative  Vascular surgery evaluated pt and recommended medical management with asa, statin, and eliquis      Acute hypoxemic respiratory failure  Patient with Hypercapnic and Hypoxic Respiratory failure which is Acute.  she is not on home oxygen. Supplemental oxygen was provided and noted- 5 L via nasal cannula   ABG  Recent Labs   Lab 01/20/25  1440   PH 7.370   PO2 20*   PCO2 50.9*   HCO3 29.4*   BE 4*     .   CXR 1/18: Cardiomegaly with no acute pulmonary process   Repeat CXR 1/20: Cardiomegaly with no acute pulmonary process   -----------------------------------  Signs/symptoms of respiratory failure include- tachypnea and increased work of breathing. Contributing diagnoses includes - CHF, COPD, and Obesity Hypoventilation Labs and images were reviewed. Patient Has recent ABG, which has been reviewed. Will treat underlying causes and adjust management of respiratory failure as follows-   ================================================  Continue supplemental O2, weaning as tolerated  Given dose of IV Solu-Medrol and also Diuresed  with IV Lasix 1/22  Continue prednisone daily, Mucinex DM 1200 b.i.d., incentive spirometry, flutter therapy, and pulmonary toiletry  Needs to mobilize,however, PT/OT has been unavailable due to current winter weather emergency and we will hopefully  resume services today  Aggressive IS and acapella; supplemental oxygen; repeat CXR without effusion or consolidation; PT/OT; encouraged ambulation and OOB with meals   1/27:  Leukocytosis noted on a.m. labs; patient with continued productive cough; repeat chest x-ray concerning for atypical infection versus edema; home Bumex reordered; broke out ordered; lactate WNL; Rocephin and doxycycline ordered; continue aggressive IS    Edema of both upper arms  Worsened while in the hospital, R>L, possible due to dependent edema or trauma from her fall  Patient anticoagulated  venous ultrasound of BUE-- negative for DVT  Continue elevation and compression to right upper extremity      Debility  Patient with Acute on chronic debility due to age-related physical debility and underlying chronic illnesses . The patient's latest AMPAC (Activity Measure for Post Acute Care) Score is listed below.    AM-PAC Score - How much help does the patient need for each activity listed  Basic Mobility Total Score: 8  Turning over in bed (including adjusting bedclothes, sheets and blankets)?: A lot  Sitting down on and standing up from a chair with arms (e.g., wheelchair, bedside commode, etc.): Unable  Moving from lying on back to sitting on the side of the bed?: A lot  Moving to and from a bed to a chair (including a wheelchair)?: Unable  Need to walk in hospital room?: Unable  Climbing 3-5 steps with a railing?: Unable    Plan  - Progressive mobility protocol initated  - PT/OT consulted  - Fall precautions in place  - CPK normal  - awaiting further PT/OT recommendations-- therapies were unavailable due to winter weather emergency and we will hopefully resume today  PT/OT following and recommending moderate intensity therapy and pt agreeable; CM consulted for discharge planning        Anterior cerebral artery aneurysm  MRA Brain:Questionable 1.8 mm aneurysm arising from the left TABATHA A1 segment.    ===================================  Nonurgent/  Small size--> <7mm   Maintain adequate blood pressure control  Will refer to Neurosurgery for evaluation when discharged      Chronic pain syndrome  Holding chronic gabapentin and hydrocodone due to encephalopathy  Hydrocodone resumed by on-call provider for pain 1/21, and patient is started to have intermittent confusion and hydrocodone was discontinued  Continue Tylenol for pain--dose increased 1/22  Monitor closely    Acute encephalopathy  Appears to have resolved  Suspect multifactorial:  Polypharmacy, CKD, hypoxia   CT of head negative, cardiac enzymes negative, CUS bilateral carotid stenosis of 50-69%  MRI brain negative for anything acute  Encephalopathy labs negative so far  SLP consulted-- no swallowing deficits  PT/OT consulted--- recommend moderate intensity therapy  MRA of head > 50% stenosis of left internal carotid artery  cavernous segment. Questionable 1.8 mm aneurysm arising from the left TABATHA A1 segment.  Consider further evaluation with digital subtraction angiography. Developmentally diminutive or narrowed left TABATHA A1 segment.  Hydrocodone resumed 1/21 per on-call provider and noted to have intermittent confusion-and was discontinued yesterday  Mental status at baseline    Acute kidney injury superimposed on stage 4 chronic kidney disease  JEFF is likely due to pre-renal azotemia due to dehydration. Baseline creatinine is  1.7- 1.9 . Most recent creatinine and eGFR are listed below.  Recent Labs     01/25/25  0718 01/26/25  0510 01/27/25  0621   CREATININE 1.6* 1.6* 1.6*   EGFRNORACEVR 34.3* 34.3* 34.3*        Plan  - very mild/ JEFF is resolved--creatinine at baseline  - Avoid nephrotoxins and renally dose meds for GFR listed above  - Monitor urine output, serial BMP, and adjust therapy as needed  - home Bumex reordered as renal function is stable    Chronic combined systolic and diastolic heart failure  Patient is identified as having Combined  Systolic and Diastolic heart failure that is Chronic. CHF is currently controlled. Latest ECHO performed and demonstrates- Results for orders placed during the hospital encounter of 12/07/24    Echo Saline Bubble? No    Interpretation Summary    Left Ventricle: The left ventricle is normal in size. Moderately increased wall thickness. Unable to assess wall motion. There is normal systolic function with a visually estimated ejection fraction of 55 - 60%. Unable to assess diastolic function due to atrial fibrillation.    Right Ventricle: Right ventricle was not well visualized due to poor acoustic window.    Left Atrium: Left atrium is severely dilated.    Right Atrium: Right atrium is severely dilated.    Aortic Valve: There is moderate to severe stenosis. Aortic valve area by VTI is 1.0 cm². Aortic valve peak velocity is 3.4 m/s. Mean gradient is 27.0 mmHg. The dimensionless index is 0.32. There is mild aortic regurgitation.    IVC/SVC: Elevated venous pressure at 15 mmHg.  . Continue Beta Blocker Aldactone Bumex  and monitor clinical status closely. Monitor on telemetry. Patient is off CHF pathway.  Monitor strict Is&Os and daily weights.  Not on fluid restriction. Cardiology is not consulted. Continue to stress to patient importance of self efficacy and  on diet for CHF. Last BNP reviewed- and noted below   Component Ref Range & Units 13 d ago  (1/6/25)   BNP 0 - 99 pg/mL 1,065   .    ===================================  Troponin normal   Have been holding chronic diuretics due to JEFF, which has improved  Given IV Lasix x1 1/20 and 1/22 for increased oxygen demands  CXR unremarkable/no edema  1/27:  Repeat chest x-ray concerning for atypical infection versus edema; home Bumex reordered his renal function is stable    Moderate aortic stenosis  Echocardiogram with evidence of aortic stenosis that is severe . The patient's most recent echocardiogram result is listed below. We will manage the valvular  abnormality by monitor for volume overload    Echo Saline Bubble? No    Result Date: 12/8/2024    Left Ventricle: The left ventricle is normal in size. Moderately   increased wall thickness. Unable to assess wall motion. There is normal   systolic function with a visually estimated ejection fraction of 55 - 60%.   Unable to assess diastolic function due to atrial fibrillation.    Right Ventricle: Right ventricle was not well visualized due to poor   acoustic window.    Left Atrium: Left atrium is severely dilated.    Right Atrium: Right atrium is severely dilated.    Aortic Valve: There is moderate to severe stenosis. Aortic valve area   by VTI is 1.0 cm². Aortic valve peak velocity is 3.4 m/s. Mean gradient is   27.0 mmHg. The dimensionless index is 0.32. There is mild aortic   regurgitation.    IVC/SVC: Elevated venous pressure at 15 mmHg.        ===========================================  Following with Dr. Ghosh for valve workup  Monitor for volume overload  Cardiology consulted per family request  1/24:  Cardiology recommended decreasing amiodarone to 100 mg daily; No recommendations to address AS further while inpatient per cardiology; cardiology signed off and will be available prn      Persistent atrial fibrillation  Patient has persistent (7 days or more) atrial fibrillation. Patient is currently in atrial fibrillation. [id number] Score: 3. The patients heart rate in the last 24 hours is as follows:  Pulse  Min: 81  Max: 109     Antiarrhythmics  amiodarone tablet 200 mg, Daily, Oral  metoprolol succinate (TOPROL-XL) 24 hr tablet 50 mg, 2 times daily, Oral    Anticoagulants  apixaban tablet 5 mg, 2 times daily, Oral    Plan  - Replete lytes with a goal of K>4, Mg >2  - Patient is anticoagulated, see medications listed above.  - Patient's afib is currently controlled    Chronic obstructive pulmonary disease  Patient's COPD is with exacerbation noted by worsening of baseline hypoxia currently.  Patient is  currently off COPD Pathway. Continue scheduled inhalers  nebulizers and Supplemental oxygen and monitor respiratory status closely.   ====================================  See acute hypoxic/hypercarbia respiratory failure A/P    Morbid obesity  Body mass index is 43.05 kg/m². Morbid obesity complicates all aspects of disease management from diagnostic modalities to treatment. Weight loss encouraged and health benefits explained to patient.     Anemia  Anemia is likely due to Iron deficiency and chronic disease due to Chronic Kidney Disease. Most recent hemoglobin and hematocrit are listed below.  Recent Labs     01/25/25  0718 01/26/25  0510 01/27/25  0621   HGB 9.3* 8.9* 8.7*   HCT 32.0* 30.4* 29.8*       Plan  - Monitor serial CBC: Daily  - Transfuse PRBC if patient becomes hemodynamically unstable, symptomatic or H/H drops below 7/21.  - Patient has received 1 units of PRBCs on 1/21  - Patient's anemia is currently improving after transfusion  - continue chronic supplementation      VTE Risk Mitigation (From admission, onward)           Ordered     IP VTE HIGH RISK PATIENT  Once         01/19/25 1559     Reason for No Pharmacological VTE Prophylaxis  Once        Question:  Reasons:  Answer:  Patient is Ambulatory    01/19/25 1559     apixaban tablet 5 mg  2 times daily         01/18/25 1949                    Discharge Planning   VALERIANO: 1/28/2025     Code Status: Full Code   Medical Readiness for Discharge Date: 1/27/2025  Discharge Plan A: Skilled Nursing Facility                Please place Justification for DME        Danielle Hoyos PA-C  Department of Hospital Medicine   [facility]

## 2025-01-27 NOTE — PLAN OF CARE
PASSR done and scanned into media. Locet called in to [place] dept of health at [phone] and faxed in to [phone].       01/27/25 0922   Post-Acute Status   Post-Acute Authorization Other   Other Status See Comments

## 2025-01-27 NOTE — PROGRESS NOTES
01/27/25 1030   WOCN Assessment   WOCN Total Time (mins) 90   Visit Date 01/27/25   Visit Time 0930   Consult Type Follow Up   WOCN Speciality Wound   Wound pressure;moisture;skin tear   Intervention assessed;changed;applied;chart review;coordination of care;team conference;orders   Teaching on-going        Wound 01/19/25 0017 Abrasion(s) Right Knee   Date First Assessed/Time First Assessed: 01/19/25 0017   Primary Wound Type: Abrasion(s)  Side: Right  Location: Knee   Wound Image    Dressing Appearance Dried drainage   Drainage Amount Scant   Drainage Characteristics/Odor Serosanguineous   Appearance Red;Pink   Care Cleansed with:;Wound cleanser;Applied:;Skin Barrier   Dressing Applied;Silver;Foam        Wound 01/19/25 0018 Abrasion(s) Left Knee   Date First Assessed/Time First Assessed: 01/19/25 0018   Present on Original Admission: Yes  Primary Wound Type: Abrasion(s)  Side: Left  Location: Knee   Wound Image    Dressing Appearance Dry   Drainage Amount None   Appearance Pink   Care Cleansed with:;Antimicrobial agent;Applied:;Skin Barrier   Dressing Applied;Silver;Foam        Wound 01/19/25 0022 Skin Tear Left Arm   Date First Assessed/Time First Assessed: 01/19/25 0022   Present on Original Admission: Yes  Primary Wound Type: Skin Tear  Side: Left  Location: Arm   Dressing Appearance Dried drainage   Drainage Amount Scant   Drainage Characteristics/Odor Sanguineous   Appearance Red;Purple;Maroon   Tissue loss description Partial thickness   Periwound Area Ecchymotic   Care Cleansed with:;Wound cleanser;Applied:;Skin Barrier   Dressing Applied  (Triad, foam, tegaderm)        Wound 01/19/25 0022 Skin Tear Right proximal Arm   Date First Assessed/Time First Assessed: 01/19/25 0022   Present on Original Admission: Yes  Primary Wound Type: Skin Tear  Side: Right  Orientation: proximal  Location: Arm   Wound Image      Dressing Appearance Dried drainage   Drainage Amount Scant   Drainage Characteristics/Odor  Serosanguineous   Appearance Red;Pink;Maroon;Purple   Tissue loss description Partial thickness   Care Cleansed with:;Wound cleanser;Applied:;Skin Barrier   Dressing Applied;Foam        Wound 01/19/25 0023 Skin Tear Left Arm   Date First Assessed/Time First Assessed: 01/19/25 0023   Present on Original Admission: Yes  Primary Wound Type: Skin Tear  Side: Left  Location: Arm   Care Cleansed with:;Wound cleanser;Applied:;Skin Barrier   Dressing Applied        Wound 01/19/25 0026 Abrasion(s) Sacral spine   Date First Assessed/Time First Assessed: 01/19/25 0026   Present on Original Admission: Yes  Primary Wound Type: Abrasion(s)  Location: Sacral spine   Wound Image     Dressing Appearance Open to air   Drainage Amount None   Appearance Pink   Care Cleansed with:;Antimicrobial agent;Applied:;Skin Barrier   Dressing Applied  (Triad)        Wound 01/22/25 0457 Skin Tear Right posterior;dorsal Hand   Date First Assessed/Time First Assessed: 01/22/25 0457   Primary Wound Type: Skin Tear  Side: Right  Orientation: posterior;dorsal  Location: Hand   Wound Image    Dressing Appearance Open to air   Drainage Amount Scant   Drainage Characteristics/Odor Serosanguineous   Appearance Maroon   Care Cleansed with:;Antimicrobial agent;Applied:;Skin Barrier   Dressing Applied  (Triad-Xeroform-ABD-Ace)     Wound care follow up. MASD to bilateral abd folds and groins noted with much improvement at this assessment. Sacral spine improving as well and is pink and intact at this assessment. Skin tears to bilateral arms and knees remain and continued wound care for these areas. Wrapped the right arm in an ace wrap from just below her fingers to her shoulder.

## 2025-01-27 NOTE — ASSESSMENT & PLAN NOTE
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 CHF is currently controlled. Latest ECHO performed and demonstrates- Results for orders placed during the hospital encounter of 12/07/24    Echo Saline Bubble? No    Interpretation Summary    Left Ventricle: The left ventricle is normal in size. Moderately increased wall thickness. Unable to assess wall motion. There is normal systolic function with a visually estimated ejection fraction of 55 - 60%. Unable to assess diastolic function due to atrial fibrillation.    Right Ventricle: Right ventricle was not well visualized due to poor acoustic window.    Left Atrium: Left atrium is severely dilated.    Right Atrium: Right atrium is severely dilated.    Aortic Valve: There is moderate to severe stenosis. Aortic valve area by VTI is 1.0 cm². Aortic valve peak velocity is 3.4 m/s. Mean gradient is 27.0 mmHg. The dimensionless index is 0.32. There is mild aortic regurgitation.    IVC/SVC: Elevated venous pressure at 15 mmHg.  . Continue Beta Blocker Aldactone Bumex  and monitor clinical status closely. Monitor on telemetry. Patient is off CHF pathway.  Monitor strict Is&Os and daily weights.  Not on fluid restriction. Cardiology is not consulted. Continue to stress to patient importance of self efficacy and  on diet for CHF. Last BNP reviewed- and noted below   Component Ref Range & Units 13 d ago  (1/6/25)   BNP 0 - 99 pg/mL 1,065   .    ===================================  Troponin normal   Have been holding chronic diuretics due to JEFF, which has improved  Given IV Lasix x1 1/20 and 1/22 for increased oxygen demands  CXR unremarkable/no edema  1/27:  Repeat chest x-ray concerning for atypical infection versus edema; home Bumex reordered his renal function is stable

## 2025-01-27 NOTE — PLAN OF CARE
Spoke with Penny at [facility] to see is patient has been accepted, Penny states referral is under review and will call back with an answer sometime today.       01/27/25 0958   Post-Acute Status   Post-Acute Authorization Other;Placement   Post-Acute Placement Status Pending post-acute provider review/more information requested   Other Status See Comments

## 2025-01-28 ENCOUNTER — CLINICAL SUPPORT (OUTPATIENT)
Dept: SMOKING CESSATION | Facility: CLINIC | Age: 70
End: 2025-01-28
Payer: COMMERCIAL

## 2025-01-28 VITALS
SYSTOLIC BLOOD PRESSURE: 138 MMHG | BODY MASS INDEX: 43.05 KG/M2 | TEMPERATURE: 98 F | HEIGHT: 63 IN | RESPIRATION RATE: 18 BRPM | OXYGEN SATURATION: 98 % | HEART RATE: 76 BPM | DIASTOLIC BLOOD PRESSURE: 79 MMHG | WEIGHT: 243 LBS

## 2025-01-28 DIAGNOSIS — Z87.891 FORMER SMOKER, STOPPED SMOKING IN DISTANT PAST: Primary | ICD-10-CM

## 2025-01-28 LAB
ALBUMIN SERPL BCP-MCNC: 3.3 G/DL (ref 3.5–5.2)
ALP SERPL-CCNC: 151 U/L (ref 55–135)
ALT SERPL W/O P-5'-P-CCNC: 25 U/L (ref 10–44)
ANION GAP SERPL CALC-SCNC: 8 MMOL/L (ref 8–16)
AST SERPL-CCNC: 19 U/L (ref 10–40)
BASOPHILS # BLD AUTO: 0.02 K/UL (ref 0–0.2)
BASOPHILS NFR BLD: 0.2 % (ref 0–1.9)
BILIRUB SERPL-MCNC: 0.6 MG/DL (ref 0.1–1)
BUN SERPL-MCNC: 32 MG/DL (ref 8–23)
CALCIUM SERPL-MCNC: 8.6 MG/DL (ref 8.7–10.5)
CHLORIDE SERPL-SCNC: 102 MMOL/L (ref 95–110)
CO2 SERPL-SCNC: 27 MMOL/L (ref 23–29)
CREAT SERPL-MCNC: 1.6 MG/DL (ref 0.5–1.4)
DIFFERENTIAL METHOD BLD: ABNORMAL
EOSINOPHIL # BLD AUTO: 0 K/UL (ref 0–0.5)
EOSINOPHIL NFR BLD: 0.1 % (ref 0–8)
ERYTHROCYTE [DISTWIDTH] IN BLOOD BY AUTOMATED COUNT: 18.8 % (ref 11.5–14.5)
EST. GFR  (NO RACE VARIABLE): 34.3 ML/MIN/1.73 M^2
GLUCOSE SERPL-MCNC: 82 MG/DL (ref 70–110)
HCT VFR BLD AUTO: 29 % (ref 37–48.5)
HGB BLD-MCNC: 8.5 G/DL (ref 12–16)
IMM GRANULOCYTES # BLD AUTO: 0.22 K/UL (ref 0–0.04)
IMM GRANULOCYTES NFR BLD AUTO: 1.7 % (ref 0–0.5)
LYMPHOCYTES # BLD AUTO: 1.1 K/UL (ref 1–4.8)
LYMPHOCYTES NFR BLD: 8.9 % (ref 18–48)
MAGNESIUM SERPL-MCNC: 1.8 MG/DL (ref 1.6–2.6)
MCH RBC QN AUTO: 27.1 PG (ref 27–31)
MCHC RBC AUTO-ENTMCNC: 29.3 G/DL (ref 32–36)
MCV RBC AUTO: 92 FL (ref 82–98)
MONOCYTES # BLD AUTO: 1 K/UL (ref 0.3–1)
MONOCYTES NFR BLD: 7.6 % (ref 4–15)
NEUTROPHILS # BLD AUTO: 10.4 K/UL (ref 1.8–7.7)
NEUTROPHILS NFR BLD: 81.5 % (ref 38–73)
NRBC BLD-RTO: 0 /100 WBC
PHOSPHATE SERPL-MCNC: 3.9 MG/DL (ref 2.7–4.5)
PLATELET # BLD AUTO: 342 K/UL (ref 150–450)
PMV BLD AUTO: 8.9 FL (ref 9.2–12.9)
POTASSIUM SERPL-SCNC: 3.4 MMOL/L (ref 3.5–5.1)
PROT SERPL-MCNC: 5.6 G/DL (ref 6–8.4)
RBC # BLD AUTO: 3.14 M/UL (ref 4–5.4)
SODIUM SERPL-SCNC: 137 MMOL/L (ref 136–145)
WBC # BLD AUTO: 12.73 K/UL (ref 3.9–12.7)

## 2025-01-28 PROCEDURE — 36415 COLL VENOUS BLD VENIPUNCTURE: CPT | Performed by: NURSE PRACTITIONER

## 2025-01-28 PROCEDURE — 94664 DEMO&/EVAL PT USE INHALER: CPT

## 2025-01-28 PROCEDURE — 63600175 PHARM REV CODE 636 W HCPCS

## 2025-01-28 PROCEDURE — 27000221 HC OXYGEN, UP TO 24 HOURS

## 2025-01-28 PROCEDURE — 25000003 PHARM REV CODE 250: Performed by: NURSE PRACTITIONER

## 2025-01-28 PROCEDURE — 25000242 PHARM REV CODE 250 ALT 637 W/ HCPCS: Performed by: HOSPITALIST

## 2025-01-28 PROCEDURE — 99900031 HC PATIENT EDUCATION (STAT)

## 2025-01-28 PROCEDURE — 85025 COMPLETE CBC W/AUTO DIFF WBC: CPT

## 2025-01-28 PROCEDURE — 94799 UNLISTED PULMONARY SVC/PX: CPT | Mod: XB

## 2025-01-28 PROCEDURE — 25000003 PHARM REV CODE 250

## 2025-01-28 PROCEDURE — 84100 ASSAY OF PHOSPHORUS: CPT | Performed by: NURSE PRACTITIONER

## 2025-01-28 PROCEDURE — 99900035 HC TECH TIME PER 15 MIN (STAT)

## 2025-01-28 PROCEDURE — 80053 COMPREHEN METABOLIC PANEL: CPT | Performed by: NURSE PRACTITIONER

## 2025-01-28 PROCEDURE — 94640 AIRWAY INHALATION TREATMENT: CPT

## 2025-01-28 PROCEDURE — 94761 N-INVAS EAR/PLS OXIMETRY MLT: CPT

## 2025-01-28 PROCEDURE — 83735 ASSAY OF MAGNESIUM: CPT | Performed by: NURSE PRACTITIONER

## 2025-01-28 PROCEDURE — 99406 BEHAV CHNG SMOKING 3-10 MIN: CPT | Performed by: CLINIC/CENTER

## 2025-01-28 RX ORDER — IPRATROPIUM BROMIDE AND ALBUTEROL SULFATE 2.5; .5 MG/3ML; MG/3ML
3 SOLUTION RESPIRATORY (INHALATION) EVERY 4 HOURS PRN
Qty: 75 ML | Refills: 0 | Status: SHIPPED | OUTPATIENT
Start: 2025-01-28 | End: 2026-01-28

## 2025-01-28 RX ORDER — BUMETANIDE 1 MG/1
1 TABLET ORAL DAILY
Qty: 90 TABLET | Refills: 4 | Status: SHIPPED | OUTPATIENT
Start: 2025-01-28 | End: 2026-01-28

## 2025-01-28 RX ORDER — PREDNISONE 20 MG/1
20 TABLET ORAL DAILY
Qty: 4 TABLET | Refills: 0 | Status: SHIPPED | OUTPATIENT
Start: 2025-01-29 | End: 2025-02-02

## 2025-01-28 RX ORDER — CEFUROXIME AXETIL 500 MG/1
500 TABLET ORAL 2 TIMES DAILY
Qty: 14 TABLET | Refills: 0 | Status: SHIPPED | OUTPATIENT
Start: 2025-01-28 | End: 2025-02-04

## 2025-01-28 RX ORDER — IBUPROFEN 200 MG
1 TABLET ORAL DAILY
Qty: 5 PATCH | Refills: 0 | Status: SHIPPED | OUTPATIENT
Start: 2025-01-28 | End: 2025-02-02

## 2025-01-28 RX ORDER — BENZONATATE 100 MG/1
100 CAPSULE ORAL 3 TIMES DAILY PRN
Qty: 30 CAPSULE | Refills: 0 | Status: SHIPPED | OUTPATIENT
Start: 2025-01-28 | End: 2025-02-07

## 2025-01-28 RX ORDER — AMIODARONE HYDROCHLORIDE 200 MG/1
100 TABLET ORAL DAILY
Qty: 90 TABLET | Refills: 3 | Status: SHIPPED | OUTPATIENT
Start: 2025-01-28

## 2025-01-28 RX ORDER — DOXYCYCLINE 100 MG/1
100 CAPSULE ORAL EVERY 12 HOURS
Qty: 14 CAPSULE | Refills: 0 | Status: SHIPPED | OUTPATIENT
Start: 2025-01-28 | End: 2025-02-04

## 2025-01-28 RX ORDER — ASPIRIN 81 MG/1
81 TABLET ORAL DAILY
Qty: 30 TABLET | Refills: 0 | Status: SHIPPED | OUTPATIENT
Start: 2025-01-29 | End: 2025-02-28

## 2025-01-28 RX ORDER — MUPIROCIN 20 MG/G
OINTMENT TOPICAL 2 TIMES DAILY
Qty: 22 G | Refills: 1 | Status: SHIPPED | OUTPATIENT
Start: 2025-01-28

## 2025-01-28 RX ADMIN — GUAIFENESIN AND DEXTROMETHORPHAN HYDROBROMIDE 2 TABLET: 600; 30 TABLET, EXTENDED RELEASE ORAL at 08:01

## 2025-01-28 RX ADMIN — DOXYCYCLINE 100 MG: 100 INJECTION, POWDER, LYOPHILIZED, FOR SOLUTION INTRAVENOUS at 12:01

## 2025-01-28 RX ADMIN — IPRATROPIUM BROMIDE 0.5 MG: 0.5 SOLUTION RESPIRATORY (INHALATION) at 01:01

## 2025-01-28 RX ADMIN — MUPIROCIN 1 G: 20 OINTMENT TOPICAL at 08:01

## 2025-01-28 RX ADMIN — FLUOXETINE HYDROCHLORIDE 40 MG: 20 CAPSULE ORAL at 08:01

## 2025-01-28 RX ADMIN — ACETAMINOPHEN 650 MG: 325 TABLET ORAL at 12:01

## 2025-01-28 RX ADMIN — Medication 150 MG: at 08:01

## 2025-01-28 RX ADMIN — METOPROLOL SUCCINATE 50 MG: 50 TABLET, FILM COATED, EXTENDED RELEASE ORAL at 08:01

## 2025-01-28 RX ADMIN — BUDESONIDE INHALATION 0.5 MG: 0.5 SUSPENSION RESPIRATORY (INHALATION) at 07:01

## 2025-01-28 RX ADMIN — METHOCARBAMOL TABLETS 750 MG: 750 TABLET, COATED ORAL at 09:01

## 2025-01-28 RX ADMIN — METHOCARBAMOL TABLETS 750 MG: 750 TABLET, COATED ORAL at 02:01

## 2025-01-28 RX ADMIN — ASPIRIN 81 MG: 81 TABLET, COATED ORAL at 08:01

## 2025-01-28 RX ADMIN — BUMETANIDE 1 MG: 1 TABLET ORAL at 08:01

## 2025-01-28 RX ADMIN — AMIODARONE HYDROCHLORIDE 100 MG: 100 TABLET ORAL at 08:01

## 2025-01-28 RX ADMIN — PREDNISONE 20 MG: 20 TABLET ORAL at 08:01

## 2025-01-28 RX ADMIN — CEFTRIAXONE 1 G: 1 INJECTION, POWDER, FOR SOLUTION INTRAMUSCULAR; INTRAVENOUS at 12:01

## 2025-01-28 RX ADMIN — APIXABAN 5 MG: 5 TABLET, FILM COATED ORAL at 08:01

## 2025-01-28 RX ADMIN — IPRATROPIUM BROMIDE 0.5 MG: 0.5 SOLUTION RESPIRATORY (INHALATION) at 07:01

## 2025-01-28 RX ADMIN — BUPROPION HYDROCHLORIDE 150 MG: 150 TABLET, FILM COATED, EXTENDED RELEASE ORAL at 08:01

## 2025-01-28 RX ADMIN — ARFORMOTEROL TARTRATE 15 MCG: 15 SOLUTION RESPIRATORY (INHALATION) at 07:01

## 2025-01-28 NOTE — NURSING
IV removed, bleeding controlled. All of pts personal belongings collected and tx w/pt. DC paperwork reviewed w/pt. Pt tx to facility via wheelchair.

## 2025-01-28 NOTE — DISCHARGE INSTRUCTIONS
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     Allergies: Review of patient's allergies indicates:  No Known Allergies    Code Status: Full    Vitals: Routine       Diet: cardiac diet, diabetic diet: 2000 calorie, and fluid restriction: 1800ml    Activity: Activity as tolerated    Nursing Precautions: Aspiration  and Fall    Bed/Surface: Low Air Loss    Consults: PT to evaluate and treat- 5 times a week and OT to evaluate and treat- 5 times a week    Oxygen: 3 liters/min via nasal cannula    Dialysis: Patient is not on dialysis.     Labs: CBC weekly CMP weekly                  Pending Diagnostic Studies:       None          Imaging:     Miscellaneous Care:   Diabetes Care: Diabetes: Check blood sugar. Fingerstick blood sugar AC and HS  Sliding Scale/Hypoglycemia Protocol: **MODERATE CORRECTION DOSE**  Blood Glucose  mg/dL    Pre-meal     Bedtime  151-200  2 units        1 unit  201-250   4 units       2 units   251-300  6 units        3 units   301-350   8 units       4 units   >350      10 units        5 units  **CALL MD for BG >350**  CHF Care: Daily Weight with notification of MD/NP of 2lb or >  increase in 24 hours    v/s and O2 sat every shift    Oxygen as needed for sats <90%    Report abnormal breath sounds to MD/NP    Edema checks q shift- notify MD/NP of increased edema    Task segmentation by nursing for daily care to decrease exertion    Schedule appointment with cardiologist, Dr. Tran in 2 Weeks    CHF education to include diet ,medication, and CHF flags for MD notification    IV Access:      Medications: Discontinue all previous medication orders, if any. See new list below.  Current Discharge Medication List        START taking these medications    Details   albuterol-ipratropium (DUO-NEB) 2.5 mg-0.5 mg/3 mL nebulizer solution Take 3 mLs by nebulization every 4 (four) hours as needed for Wheezing or Shortness of Breath. Rescue  Qty: 75 mL, Refills: 0      aspirin (ECOTRIN) 81 MG EC tablet Take 1 tablet (81 mg total) by mouth once daily.  Qty: 30 tablet, Refills: 0      benzonatate (TESSALON) 100 MG capsule Take 1 capsule (100 mg total) by mouth 3 (three) times daily as needed for Cough.  Qty: 30 capsule, Refills: 0      cefUROXime (CEFTIN) 500 MG tablet Take 1 tablet (500 mg total) by mouth 2 (two) times daily. for 7 days  Qty: 14 tablet, Refills: 0      dextromethorphan-guaiFENesin  mg (MUCINEX DM)  mg per 12 hr tablet Take 2 tablets by mouth 2 (two) times daily. for 5 days  Qty: 20 tablet, Refills: 0      doxycycline (VIBRAMYCIN) 100 MG Cap Take 1 capsule (100 mg total) by mouth every 12 (twelve) hours. for 7 days  Qty: 14 capsule, Refills: 0      nicotine (NICODERM CQ) 14 mg/24 hr Place 1 patch onto the skin once daily. for 5 days  Qty: 5 patch, Refills: 0      predniSONE (DELTASONE) 20 MG tablet Take 1 tablet (20 mg total) by mouth once daily. for 4 days  Qty: 4 tablet, Refills: 0           CONTINUE these medications which have CHANGED    Details   amiodarone (PACERONE) 200 MG Tab Take 0.5 tablets (100 mg total) by mouth once daily.  Qty: 90 tablet, Refills: 3    Associated  Diagnoses: Chronic combined systolic and diastolic heart failure; Coronary artery disease involving native coronary artery of native heart without angina pectoris; HFrEF (heart failure with reduced ejection fraction); Primary hypertension; Mixed hyperlipidemia; Moderate aortic stenosis; Persistent atrial fibrillation; CKD (chronic kidney disease) stage 4, GFR 15-29 ml/min; Dyslipidemia; Chronic heart failure with preserved ejection fraction; Nonrheumatic aortic valve stenosis; Hypertensive kidney disease with stage 4 chronic kidney disease           CONTINUE these medications which have NOT CHANGED    Details   amLODIPine (NORVASC) 5 MG tablet Take 1 tablet (5 mg total) by mouth every evening.  Qty: 90 tablet, Refills: 3    Comments: .  Associated Diagnoses: Chronic combined systolic and diastolic heart failure; Coronary artery disease involving native coronary artery of native heart without angina pectoris; HFrEF (heart failure with reduced ejection fraction); Primary hypertension; Mixed hyperlipidemia; Moderate aortic stenosis; Persistent atrial fibrillation; CKD (chronic kidney disease) stage 4, GFR 15-29 ml/min; Dyslipidemia; Chronic heart failure with preserved ejection fraction; Nonrheumatic aortic valve stenosis; Hypertensive kidney disease with stage 4 chronic kidney disease      apixaban (ELIQUIS) 5 mg Tab Take 1 tablet (5 mg total) by mouth 2 (two) times daily.  Qty: 60 tablet, Refills: 11    Associated Diagnoses: Persistent atrial fibrillation      atorvastatin (LIPITOR) 10 MG tablet Take 1 tablet (10 mg total) by mouth every evening.  Qty: 90 tablet, Refills: 3    Associated Diagnoses: Dyslipidemia      budesonide-glycopyr-formoterol (BREZTRI AEROSPHERE) 160-9-4.8 mcg/actuation HFAA Inhale 2 puffs into the lungs 2 (two) times a day.    Associated Diagnoses: Chronic obstructive pulmonary disease with acute exacerbation      bumetanide (BUMEX) 1 MG tablet Take 1 tablet (1 mg total) by mouth once  daily.  Qty: 90 tablet, Refills: 4    Comments: Take 2 tabs p.o. q.day for the next 4 days then 1 tab p.o. q.a.m. double dose p.r.n. 2 lb weight gain within 1 day  Associated Diagnoses: Chronic combined systolic and diastolic heart failure; Coronary artery disease involving native coronary artery of native heart without angina pectoris; HFrEF (heart failure with reduced ejection fraction); Primary hypertension; Mixed hyperlipidemia; Moderate aortic stenosis; Persistent atrial fibrillation; CKD (chronic kidney disease) stage 4, GFR 15-29 ml/min; Dyslipidemia; Chronic heart failure with preserved ejection fraction; Nonrheumatic aortic valve stenosis; Hypertensive kidney disease with stage 4 chronic kidney disease      buPROPion (WELLBUTRIN SR) 150 MG TBSR 12 hr tablet Take 1 tablet (150 mg total) by mouth 2 (two) times daily.  Qty: 180 tablet, Refills: 3    Associated Diagnoses: Mild episode of recurrent major depressive disorder      FLUoxetine 40 MG capsule Take 1 capsule (40 mg total) by mouth once daily.  Qty: 90 capsule, Refills: 3    Associated Diagnoses: Mild episode of recurrent major depressive disorder      iron polysaccharides (NIFEREX) 150 mg iron Cap Take 1 capsule (150 mg total) by mouth once daily.  Qty: 90 capsule, Refills: 0    Associated Diagnoses: Chronic combined systolic and diastolic heart failure; Coronary artery disease involving native coronary artery of native heart without angina pectoris; HFrEF (heart failure with reduced ejection fraction); Primary hypertension; Mixed hyperlipidemia; Moderate aortic stenosis; Persistent atrial fibrillation; CKD (chronic kidney disease) stage 4, GFR 15-29 ml/min; Dyslipidemia; Chronic heart failure with preserved ejection fraction; Nonrheumatic aortic valve stenosis; Hypertensive kidney disease with stage 4 chronic kidney disease      metoprolol succinate (TOPROL-XL) 50 MG 24 hr tablet Take 1 tablet (50 mg total) by mouth 2 (two) times daily.  Qty: 180  tablet, Refills: 3    Comments: .  Associated Diagnoses: Hypertensive kidney disease with stage 4 chronic kidney disease      mirtazapine (REMERON) 15 MG tablet Take 1 tablet (15 mg total) by mouth every evening. For sleep  Qty: 30 tablet, Refills: 3    Associated Diagnoses: Primary insomnia      spironolactone (ALDACTONE) 25 MG tablet Take 1 tablet (25 mg total) by mouth once daily.  Qty: 90 tablet, Refills: 3    Comments: .  Associated Diagnoses: Chronic combined systolic and diastolic heart failure; Coronary artery disease involving native coronary artery of native heart without angina pectoris; HFrEF (heart failure with reduced ejection fraction); Primary hypertension; Mixed hyperlipidemia; Moderate aortic stenosis; Persistent atrial fibrillation; CKD (chronic kidney disease) stage 4, GFR 15-29 ml/min; Dyslipidemia; Chronic heart failure with preserved ejection fraction; Nonrheumatic aortic valve stenosis; Hypertensive kidney disease with stage 4 chronic kidney disease      gabapentin (NEURONTIN) 300 MG capsule Take 1 capsule (300 mg total) by mouth nightly as needed (pain).  Qty: 30 capsule, Refills: 0    Associated Diagnoses: Primary osteoarthritis of both knees      mupirocin (BACTROBAN) 2 % ointment Apply topically 2 (two) times daily. To infected skin tear  Qty: 22 g, Refills: 1      semaglutide, weight loss, (WEGOVY) 0.25 mg/0.5 mL PnIj Inject 0.25 mg into the skin every 7 days.  Qty: 6 mL, Refills: 3    Associated Diagnoses: Persistent atrial fibrillation; Chronic heart failure with preserved ejection fraction; Nonrheumatic aortic valve stenosis; Class 3 severe obesity with body mass index (BMI) of 40.0 to 44.9 in adult, unspecified obesity type, unspecified whether serious comorbidity present      triamcinolone acetonide 0.1% (KENALOG) 0.1 % cream Apply topically 2 (two) times daily.  Qty: 60 g, Refills: 2    Associated Diagnoses: Stasis dermatitis of both legs           STOP taking these medications        HYDROcodone-acetaminophen (NORCO) 5-325 mg per tablet Comments:   Reason for Stopping:         potassium chloride (MICRO-K) 10 MEQ CpSR Comments:   Reason for Stopping:             Follow up:    Follow-up Information       Elkin You MD Follow up.    Specialties: Family Medicine, Home Health Services, Hospice Services  Contact information:  [address]  [address]  [place] [address]  [phone]               Jorge Tran MD Follow up.    Specialties: Interventional Cardiology, Cardiology  Contact information:  [address]  [address]  [place] [address]  [phone]               Keerthi Sotomayor MD Follow up.    Specialties: Vascular Surgery, Surgery  Contact information:  [address]  [address]  [place] [address]                               Immunizations Administered as of 1/28/2025       Name Date Dose VIS Date Route Exp Date    COVID-19, MRNA, LN-S, PF (Moderna) 3/26/2021 -- -- -- --    : Moderna [employer]     Lot: [id number]     Comment: Adminis     COVID-19, MRNA, LN-S, PF (Moderna) 2/26/2021 -- -- -- --    : Moderna [employer]     Lot: [id number]     Comment: Adminis                 Some patients may experience side effects after vaccination.  These may include fever, headache, muscle or joint aches.  Most symptoms resolve with 24-48 hours and do not require urgent medical evaluation unless they persist for more than 72 hours or symptoms are concerning for an unrelated medical condition.          Danielle Hoyos PA-C

## 2025-01-28 NOTE — PLAN OF CARE
Ramses Bronson patient is accepted for SNF.       01/27/25 1400   Post-Acute Status   Post-Acute Authorization Placement   Post-Acute Placement Status Pending payor review/awaiting authorization (if required)

## 2025-01-28 NOTE — PT/OT/SLP PROGRESS
Physical Therapy      Patient Name:  Iris Mueller   MRN:  [id number]    Patient not seen today x2 attempts (pt care, then prepping for d/c.  Will follow-up if d/c changes.

## 2025-01-28 NOTE — PLAN OF CARE
Patient cleared for discharge by case management to SNF at [facility].     Report information given to primary nurse:   Call report to [phone] Nurse for A26.     [facility] will provide wheelchair van transport.        01/28/25 1410   Final Note   Assessment Type Final Discharge Note   Anticipated Discharge Disposition SNF   Hospital Resources/Appts/Education Provided Appointments scheduled and added to AVS

## 2025-01-28 NOTE — PROGRESS NOTES
01/28/25 1300   Tobacco Cessation Intervention   Do you use any type of tobacco product? No  (Quit 12/27/2024)   Are you interested in quitting use of tobacco products? Ready to quit   Are you interested in Nicotine Replacement for symptom relief? No   $ Smoking Cessation Charges Smoking Cessation - Intermediate (CTTS)

## 2025-01-28 NOTE — PLAN OF CARE
Ramses Murphy at [facility] auth is approved; auth # [id number]. Auth number provided to Penny at [facility].        01/28/25 1309   Post-Acute Status   Post-Acute Authorization Placement   Post-Acute Placement Status Set-up Complete/Auth obtained

## 2025-01-28 NOTE — PLAN OF CARE
Called Katherine at [facility] to check status of auth for SNF, Katherine states it is not completed yet but based on clinicals received her medical director has given permission to approve. Katherine states she will call back when it is done.          01/28/25 0839   Post-Acute Status   Post-Acute Authorization Placement   Post-Acute Placement Status Pending payor review/awaiting authorization (if required)

## 2025-01-28 NOTE — DISCHARGE SUMMARY
[facility] Medicine  Discharge Summary      Patient Name: Iris Mueller  MRN: [id number]  BONIFACIO: [id number]  Patient Class: IP- Inpatient  Admission Date: 1/18/2025  Hospital Length of Stay: 7 days  Discharge Date and Time:  01/28/2025 2:51 PM  Attending Physician: Sergey Casey DO   Discharging Provider: Danielle Hoyos PA-C  Primary Care Provider: Elkin You MD    Primary Care Team: Networked reference to record PCT     HPI:   69-year-old female with a past medical history of COPD asthma, HTN, obesity, CHF, Right knee wound infection, arthritis who presented to the emergency room for  syncope and fall.  She reports just prior to fall and she felt dizzy.  She is unaware of the events of the fall.  Patient has had frequent falls recently. She fell getting out of the shower. She is not entirely sure what happened. She is not sure why she fell. She has pain all over. Mostly complains of pain to her bilateral knees and to her chest. She says that she does not know why she fell but she was in the shower when it happened. She does not think that she slipped a she does take Norco 3 times a day and gabapentin.     In the ER, patient atrial fibrillation,t mild anemia, with H&H 8.7 and 28.1, JEFF with BUN 27 creatinine 2.2, baseline creatinine is 1.81., L dysphagia is 174, UA negative for infection CT head nonacute, right humerus x-ray nonacute, pelvic x-ray no acute fractures, right knee with diffuse soft tissue edema, chest x-ray nonacute with some cardiomegaly    Admit to hospital medicine for syncope workup consult PT/OT , JEFF    I spoke to daughter who states patient recently was seen by cardiologist who stopped 1 of her blood pressure medicines but daughter is concerned patient did not stopped taking the blood pressure medication and her blood pressure dropped    * No surgery found *      Hospital Course:   Ms. Mueller was admitted after having a fall at home.  Syncope was  suspected since she was unable to recall the episode.  While here, her labs and vital signs were monitored and she was maintained on telemetry.  She was also found to be acutely encephalopathic and somnolent. When records were reviewed, she was noted to be last admitted to the hospital 12/2024 for AMS and a CVA workup was negative for stroke at that time.  Neurology also evaluated her at that time and her symptoms were suspected to likely be due to her chronic pain medication.      Due to her somnolence, Encephalopathy labs were done and were negative.  Her chronic hydrocodone was held.  Her carotid ultrasound showed bilateral 50-69% carotid stenosis. She had worsening hypoxia requiring increased levels of supplemental O2.  She was diuresed with a dose of IV Lasix and a CXR was done which was unremarkable.  She had an MRI of the head which was negative for anything acute, and a brain MRA which was negative for stenosis and incidentally showed a > 50% stenosis of left internal carotid artery  cavernous segment. Questionable 1.8 mm aneurysm arising from the left TABATHA A1 segment, which was a non urgent finding.  Referral for Neurosurgery was placed for this.  MRA of the neck was ordered however patient was uncooperative, despite getting IV Ativan, and it could not be done.  Vascular surgery was consulted for bilateral carotid stenosis who recommended medical management with aspirin and follow up outpatient. SLP was consulted and no swallowing deficits were noted.  PT/OT were consulted and recommended moderate intensity therapy.  Her encephalopathy started to improve.  An EEG was initially ordered since she had persistent somnolence, however, since her mental status improved, the EEG was canceled.  Mental status returned to baseline.    Family had concern about her right shoulder due to patient complaining of significant pain and limited ability of using that arm.  An MRI of the shoulder was ordered for patient was not  cooperative enough for imaging to be done.  CT shoulder was obtained which showed osteoarthritic changes involving the glenohumeral and acromioclavicular joints without evidence of fracture or dislocation.     She has a history of severe AS and Cardiology was consulted for an evaluation per the family request.  She had also presented with mild JEFF, which improved quickly. She developed swelling to her arms, with the right being greater.  This was suspected to possibly be due to either dependent edema due to not using or or trauma from the fall.  Venous ultrasound was ordered and was negative for DVT.  Compression was also applied to the arm.    She continued to require increased supplemental O2, and this was thought to be due to hypoventilatory respiratory failure, encephalopathy, underlying COPD and CHF.  ABG was done and showed hypoxia mild hypercarbia.  Her hemoglobin was noted to remain 7-7.2, and since she has a history of CHF, she was transfused 1 unit PRBC to maintain hemoglobin closer to 8. She was maintained on nebulizers.  She was given another dose of IV Lasix, and also given IV Solu-Medrol followed by oral prednisone.  Flutter therapy was added.  Repeat chest x-ray was obtained and did not demonstrate of consolidation or effusion.  Supplemental oxygen continued to be weaned.  Aggressive IS and Acapella were encouraged.  WBC was noted to increase.  Lactate was obtained and was negative.  Chest x-ray was obtained and was concerning for very mild intermittent worsening of interstitial edema versus atypical infection.  Procalcitonin very mildly elevated.  Started on doxycycline and Rocephin.  Reordered home Bumex.  Continued aggressive IS.  Remained afebrile.  WBC improved.  Patient was accepted to [facility].  Patient was complete course of oral antibiotics which were prescribed.  Decreased amiodarone 200 mg daily per Cardiology recommendations.  Patient was follow up with PCP, Cardiology, vascular  surgery.  Outpatient referral made to pulmonology, ortho, and Neurosurgery for aneurysm.  Patient was discharged to [facility].       Goals of Care Treatment Preferences:  Code Status: Full Code      SDOH Screening:  The patient was screened for utility difficulties, food insecurity, transport difficulties, housing insecurity, and interpersonal safety and there were no concerns identified this admission.     Consults:   Consults (From admission, onward)          Status Ordering Provider     Inpatient consult to PICC Line Nurse  Once        Provider:  (Not yet assigned)    Completed MADAY MCDANIEL     Inpatient consult to   Once        Provider:  (Not yet assigned)    Completed RICKIE KERN     Inpatient consult to Vascular Surgery  Once        Provider:  Keerthi Sotomayor MD    Completed RICKIE KERN     Inpatient consult to Cardiology  Once        Provider:  Bhavya Bob MD    Completed NELSON DUTTON     Inpatient consult to Nephrology  Once        Provider:  Rg Danielle MD    Completed RIKKI PEREZ            * Syncope  Possibly medication induced (on hydrocodone and gabapentin) vs neurologic etiology vs cardiogenic/aortic stenosis possibly contributing  Family endorses that she did not pass out, but that her legs got weak and she went down, and patient remained awake and alert during episode.  They also endorsed that this is not an unusual/new issue for her.  =================================  CT of head negative  UA negative for infection   Last TTE 12/08/2024: EF 55-60%, indeterminate diastolic function, moderate to severe AS  MRI Brain:  Negative for anything acute  MRA of head > 50% stenosis of left internal carotid artery  cavernous segment. Questionable 1.8 mm aneurysm arising from the left TABATHA A1 segment.  Consider further evaluation with digital subtraction angiography. Developmentally diminutive or narrowed left TABATHA A1 segment.    =======================================  MRA of neck:  Pending--not obtain as of yet due to patient uncooperative  Orthostatics unable to be obtained due to current condition/ we will await PT for assistance.  PT not available since 1/21  due to winter weather emergency  PT/OT recommends moderate intensity   Maintain fall/syncope precautions  Orthostatic vitals negative  Vascular surgery evaluated pt and recommended medical management with asa, statin, and eliquis      Acute hypoxemic respiratory failure  Patient with Hypercapnic and Hypoxic Respiratory failure which is Acute.  she is not on home oxygen. Supplemental oxygen was provided and noted- 5 L via nasal cannula   ABG  Recent Labs   Lab 01/20/25  1440   PH 7.370   PO2 20*   PCO2 50.9*   HCO3 29.4*   BE 4*     .   CXR 1/18: Cardiomegaly with no acute pulmonary process   Repeat CXR 1/20: Cardiomegaly with no acute pulmonary process   -----------------------------------  Signs/symptoms of respiratory failure include- tachypnea and increased work of breathing. Contributing diagnoses includes - CHF, COPD, and Obesity Hypoventilation Labs and images were reviewed. Patient Has recent ABG, which has been reviewed. Will treat underlying causes and adjust management of respiratory failure as follows-   ================================================  Continue supplemental O2, weaning as tolerated  Given dose of IV Solu-Medrol and also Diuresed  with IV Lasix 1/22  Continue prednisone daily, Mucinex DM 1200 b.i.d., incentive spirometry, flutter therapy, and pulmonary toiletry  Needs to mobilize,however, PT/OT has been unavailable due to current winter weather emergency and we will hopefully resume services today  Aggressive IS and acapella; supplemental oxygen; repeat CXR without effusion or consolidation; PT/OT; encouraged ambulation and OOB with meals   1/27:  Leukocytosis noted on a.m. labs; patient with continued productive cough; repeat chest x-ray concerning  for atypical infection versus edema; home Bumex reordered; broke out ordered; lactate WNL; Rocephin and doxycycline ordered; continue aggressive IS    Edema of both upper arms  Worsened while in the hospital, R>L, possible due to dependent edema or trauma from her fall  Patient anticoagulated  venous ultrasound of BUE-- negative for DVT  Continue elevation and compression to right upper extremity      Debility  Patient with Acute on chronic debility due to age-related physical debility and underlying chronic illnesses . The patient's latest AMPAC (Activity Measure for Post Acute Care) Score is listed below.    AM-PAC Score - How much help does the patient need for each activity listed  Basic Mobility Total Score: 8  Turning over in bed (including adjusting bedclothes, sheets and blankets)?: A lot  Sitting down on and standing up from a chair with arms (e.g., wheelchair, bedside commode, etc.): Unable  Moving from lying on back to sitting on the side of the bed?: A lot  Moving to and from a bed to a chair (including a wheelchair)?: Unable  Need to walk in hospital room?: Unable  Climbing 3-5 steps with a railing?: Unable    Plan  - Progressive mobility protocol initated  - PT/OT consulted  - Fall precautions in place  - CPK normal  - awaiting further PT/OT recommendations-- therapies were unavailable due to winter weather emergency and we will hopefully resume today  PT/OT following and recommending moderate intensity therapy and pt agreeable; CM consulted for discharge planning        Anterior cerebral artery aneurysm  MRA Brain:Questionable 1.8 mm aneurysm arising from the left TABATHA A1 segment.   ===================================  Nonurgent/  Small size--> <7mm   Maintain adequate blood pressure control  Will refer to Neurosurgery for evaluation when discharged      Chronic pain syndrome  Holding chronic gabapentin and hydrocodone due to encephalopathy  Hydrocodone resumed by on-call provider for pain 1/21, and  patient is started to have intermittent confusion and hydrocodone was discontinued  Continue Tylenol for pain--dose increased 1/22  Monitor closely    Acute encephalopathy  Appears to have resolved  Suspect multifactorial:  Polypharmacy, CKD, hypoxia   CT of head negative, cardiac enzymes negative, CUS bilateral carotid stenosis of 50-69%  MRI brain negative for anything acute  Encephalopathy labs negative so far  SLP consulted-- no swallowing deficits  PT/OT consulted--- recommend moderate intensity therapy  MRA of head > 50% stenosis of left internal carotid artery  cavernous segment. Questionable 1.8 mm aneurysm arising from the left TABATHA A1 segment.  Consider further evaluation with digital subtraction angiography. Developmentally diminutive or narrowed left TABATHA A1 segment.  Hydrocodone resumed 1/21 per on-call provider and noted to have intermittent confusion-and was discontinued yesterday  Mental status at baseline    Acute kidney injury superimposed on stage 4 chronic kidney disease  JEFF is likely due to pre-renal azotemia due to dehydration. Baseline creatinine is  1.7- 1.9 . Most recent creatinine and eGFR are listed below.  Recent Labs     01/25/25  0718 01/26/25  0510 01/27/25  0621   CREATININE 1.6* 1.6* 1.6*   EGFRNORACEVR 34.3* 34.3* 34.3*        Plan  - very mild/ JEFF is resolved--creatinine at baseline  - Avoid nephrotoxins and renally dose meds for GFR listed above  - Monitor urine output, serial BMP, and adjust therapy as needed  - home Bumex reordered as renal function is stable    Chronic combined systolic and diastolic heart failure  Patient is identified as having Combined Systolic and Diastolic heart failure that is Chronic. CHF is currently controlled. Latest ECHO performed and demonstrates- Results for orders placed during the hospital encounter of 12/07/24    Echo Saline Bubble? No    Interpretation Summary    Left Ventricle: The left ventricle is normal in size. Moderately increased wall  thickness. Unable to assess wall motion. There is normal systolic function with a visually estimated ejection fraction of 55 - 60%. Unable to assess diastolic function due to atrial fibrillation.    Right Ventricle: Right ventricle was not well visualized due to poor acoustic window.    Left Atrium: Left atrium is severely dilated.    Right Atrium: Right atrium is severely dilated.    Aortic Valve: There is moderate to severe stenosis. Aortic valve area by VTI is 1.0 cm². Aortic valve peak velocity is 3.4 m/s. Mean gradient is 27.0 mmHg. The dimensionless index is 0.32. There is mild aortic regurgitation.    IVC/SVC: Elevated venous pressure at 15 mmHg.  . Continue Beta Blocker Aldactone Bumex  and monitor clinical status closely. Monitor on telemetry. Patient is off CHF pathway.  Monitor strict Is&Os and daily weights.  Not on fluid restriction. Cardiology is not consulted. Continue to stress to patient importance of self efficacy and  on diet for CHF. Last BNP reviewed- and noted below   Component Ref Range & Units 13 d ago  (1/6/25)   BNP 0 - 99 pg/mL 1,065   .    ===================================  Troponin normal   Have been holding chronic diuretics due to JEFF, which has improved  Given IV Lasix x1 1/20 and 1/22 for increased oxygen demands  CXR unremarkable/no edema  1/27:  Repeat chest x-ray concerning for atypical infection versus edema; home Bumex reordered his renal function is stable    Moderate aortic stenosis  Echocardiogram with evidence of aortic stenosis that is severe . The patient's most recent echocardiogram result is listed below. We will manage the valvular abnormality by monitor for volume overload    Echo Saline Bubble? No    Result Date: 12/8/2024    Left Ventricle: The left ventricle is normal in size. Moderately   increased wall thickness. Unable to assess wall motion. There is normal   systolic function with a visually estimated ejection fraction of 55 - 60%.   Unable to assess  diastolic function due to atrial fibrillation.    Right Ventricle: Right ventricle was not well visualized due to poor   acoustic window.    Left Atrium: Left atrium is severely dilated.    Right Atrium: Right atrium is severely dilated.    Aortic Valve: There is moderate to severe stenosis. Aortic valve area   by VTI is 1.0 cm². Aortic valve peak velocity is 3.4 m/s. Mean gradient is   27.0 mmHg. The dimensionless index is 0.32. There is mild aortic   regurgitation.    IVC/SVC: Elevated venous pressure at 15 mmHg.        ===========================================  Following with Dr. Ghosh for valve workup  Monitor for volume overload  Cardiology consulted per family request  1/24:  Cardiology recommended decreasing amiodarone to 100 mg daily; No recommendations to address AS further while inpatient per cardiology; cardiology signed off and will be available prn      Persistent atrial fibrillation  Patient has persistent (7 days or more) atrial fibrillation. Patient is currently in atrial fibrillation. [id number] Score: 3. The patients heart rate in the last 24 hours is as follows:  Pulse  Min: 81  Max: 109     Antiarrhythmics  amiodarone tablet 200 mg, Daily, Oral  metoprolol succinate (TOPROL-XL) 24 hr tablet 50 mg, 2 times daily, Oral    Anticoagulants  apixaban tablet 5 mg, 2 times daily, Oral    Plan  - Replete lytes with a goal of K>4, Mg >2  - Patient is anticoagulated, see medications listed above.  - Patient's afib is currently controlled    Chronic obstructive pulmonary disease  Patient's COPD is with exacerbation noted by worsening of baseline hypoxia currently.  Patient is currently off COPD Pathway. Continue scheduled inhalers  nebulizers and Supplemental oxygen and monitor respiratory status closely.   ====================================  See acute hypoxic/hypercarbia respiratory failure A/P    Morbid obesity  Body mass index is 43.05 kg/m². Morbid obesity complicates all aspects of disease management  from diagnostic modalities to treatment. Weight loss encouraged and health benefits explained to patient.     Anemia  Anemia is likely due to Iron deficiency and chronic disease due to Chronic Kidney Disease. Most recent hemoglobin and hematocrit are listed below.  Recent Labs     01/25/25  0718 01/26/25  0510 01/27/25  0621   HGB 9.3* 8.9* 8.7*   HCT 32.0* 30.4* 29.8*       Plan  - Monitor serial CBC: Daily  - Transfuse PRBC if patient becomes hemodynamically unstable, symptomatic or H/H drops below 7/21.  - Patient has received 1 units of PRBCs on 1/21  - Patient's anemia is currently improving after transfusion  - continue chronic supplementation      Final Active Diagnoses:    Diagnosis Date Noted POA    PRINCIPAL PROBLEM:  Syncope [R55] 01/18/2025 Yes    Acute hypoxemic respiratory failure [J96.01] 01/22/2025 Yes    Debility [R53.81] 01/22/2025 Yes    Edema of both upper arms [R60.0] 01/22/2025 Yes    Anterior cerebral artery aneurysm [I67.1] 01/21/2025 Yes    Acute encephalopathy [G93.40] 01/19/2025 Yes    Chronic pain syndrome [G89.4] 01/19/2025 Yes    Acute kidney injury superimposed on stage 4 chronic kidney disease [N17.9, N18.4] 01/18/2025 Yes    Moderate aortic stenosis [I35.0] 08/18/2023 Yes    Chronic combined systolic and diastolic heart failure [I50.42] 08/18/2023 Yes    Persistent atrial fibrillation [I48.19] 06/12/2023 Yes    Chronic obstructive pulmonary disease [J44.9] 01/21/2020 Yes    Morbid obesity [E66.01] 09/19/2019 Yes    Anemia [D64.9] 09/18/2019 Yes      Problems Resolved During this Admission:       Discharged Condition: good    Disposition: Skilled Nursing Facility    Follow Up:   Contact information for follow-up providers       Elkin You MD Follow up.    Specialties: Family Medicine, Home Health Services, Hospice Services  Contact information:  [address] [facility] [address]  [address]  [place] [address]  [phone]               Jorge Tran MD Follow up.    Specialties:  Interventional Cardiology, Cardiology  Contact information:  [address] [facility]  [address]  [facility] [address]  [phone]               Keerthi Sotomayor MD Follow up.    Specialties: Vascular Surgery, Surgery  Contact information:  [address]  [address]  [facility] [address]                       Contact information for after-discharge care       Destination       [facility] .    Service: Skilled Nursing  Contact information:  [address]  [facility] [address]  [phone]                                 Patient Instructions:      Ambulatory referral/consult to Smoking Cessation Program   Standing Status: Future   Referral Priority: Routine Referral Type: Consultation   Referral Reason: Specialty Services Required   Requested Specialty: CTTS   Number of Visits Requested: 1     Ambulatory referral/consult to Pulmonology   Standing Status: Future   Referral Priority: Routine Referral Type: Consultation   Referral Reason: Specialty Services Required   Requested Specialty: Pulmonary Disease   Number of Visits Requested: 1     Ambulatory referral/consult to Neurosurgery   Standing Status: Future   Referral Priority: Routine Referral Type: Consultation   Referral Reason: Specialty Services Required   Requested Specialty: Neurosurgery   Number of Visits Requested: 1     Ambulatory referral/consult to Orthopedics   Standing Status: Future   Referral Priority: Routine Referral Type: Consultation   Requested Specialty: Orthopedic Surgery   Number of Visits Requested: 1     Notify your health care provider if you experience any of the following:  temperature >100.4     Notify your health care provider if you experience any of the following:  persistent nausea and vomiting or diarrhea     Notify your health care provider if you experience any of the following:  severe uncontrolled pain     Notify your health care provider if you experience any of the following:  redness, tenderness, or signs of  infection (pain, swelling, redness, odor or green/yellow discharge around incision site)     Notify your health care provider if you experience any of the following:  increased confusion or weakness     Notify your health care provider if you experience any of the following:  difficulty breathing or increased cough     Activity as tolerated       Significant Diagnostic Studies: Labs: CMP   Recent Labs   Lab 01/27/25  0621 01/28/25  0648    137   K 3.5 3.4*    102   CO2 27 27   GLU 89 82   BUN 33* 32*   CREATININE 1.6* 1.6*   CALCIUM 8.8 8.6*   PROT 5.9* 5.6*   ALBUMIN 3.4* 3.3*   BILITOT 0.7 0.6   ALKPHOS 173* 151*   AST 28 19   ALT 27 25   ANIONGAP 9 8    and CBC   Recent Labs   Lab 01/27/25  0621 01/28/25  0648   WBC 14.22* 12.73*   HGB 8.7* 8.5*   HCT 29.8* 29.0*    342       Pending Diagnostic Studies:       None           Medications:  Reconciled Home Medications:      Medication List        START taking these medications      albuterol-ipratropium 2.5 mg-0.5 mg/3 mL nebulizer solution  Commonly known as: DUO-NEB  Take 3 mLs by nebulization every 4 (four) hours as needed for Wheezing or Shortness of Breath. Rescue     aspirin 81 MG EC tablet  Commonly known as: ECOTRIN  Take 1 tablet (81 mg total) by mouth once daily.  Start taking on: January 29, 2025     benzonatate 100 MG capsule  Commonly known as: TESSALON  Take 1 capsule (100 mg total) by mouth 3 (three) times daily as needed for Cough.     cefUROXime 500 MG tablet  Commonly known as: CEFTIN  Take 1 tablet (500 mg total) by mouth 2 (two) times daily. for 7 days     dextromethorphan-guaiFENesin  mg  mg per 12 hr tablet  Commonly known as: MUCINEX DM  Take 2 tablets by mouth 2 (two) times daily. for 5 days     doxycycline 100 MG Cap  Commonly known as: VIBRAMYCIN  Take 1 capsule (100 mg total) by mouth every 12 (twelve) hours. for 7 days     nicotine 14 mg/24 hr  Commonly known as: NICODERM CQ  Place 1 patch onto the skin once  daily. for 5 days     predniSONE 20 MG tablet  Commonly known as: DELTASONE  Take 1 tablet (20 mg total) by mouth once daily. for 4 days  Start taking on: January 29, 2025            CHANGE how you take these medications      amiodarone 200 MG Tab  Commonly known as: PACERONE  Take 0.5 tablets (100 mg total) by mouth once daily.  What changed: how much to take            CONTINUE taking these medications      amLODIPine 5 MG tablet  Commonly known as: NORVASC  Take 1 tablet (5 mg total) by mouth every evening.     apixaban 5 mg Tab  Commonly known as: ELIQUIS  Take 1 tablet (5 mg total) by mouth 2 (two) times daily.     atorvastatin 10 MG tablet  Commonly known as: LIPITOR  Take 1 tablet (10 mg total) by mouth every evening.     BREZTRI AEROSPHERE 160-9-4.8 mcg/actuation Hfaa  Generic drug: budesonide-glycopyr-formoterol  Inhale 2 puffs into the lungs 2 (two) times a day.     bumetanide 1 MG tablet  Commonly known as: BUMEX  Take 1 tablet (1 mg total) by mouth once daily.     buPROPion 150 MG TBSR 12 hr tablet  Commonly known as: WELLBUTRIN SR  Take 1 tablet (150 mg total) by mouth 2 (two) times daily.     FLUoxetine 40 MG capsule  Take 1 capsule (40 mg total) by mouth once daily.     gabapentin 300 MG capsule  Commonly known as: NEURONTIN  Take 1 capsule (300 mg total) by mouth nightly as needed (pain).     iron polysaccharides 150 mg iron Cap  Commonly known as: NIFEREX  Take 1 capsule (150 mg total) by mouth once daily.     metoprolol succinate 50 MG 24 hr tablet  Commonly known as: TOPROL-XL  Take 1 tablet (50 mg total) by mouth 2 (two) times daily.     mirtazapine 15 MG tablet  Commonly known as: REMERON  Take 1 tablet (15 mg total) by mouth every evening. For sleep     mupirocin 2 % ointment  Commonly known as: BACTROBAN  Apply topically 2 (two) times daily. To infected skin tear     spironolactone 25 MG tablet  Commonly known as: ALDACTONE  Take 1 tablet (25 mg total) by mouth once daily.     triamcinolone  acetonide 0.1% 0.1 % cream  Commonly known as: KENALOG  Apply topically 2 (two) times daily.            STOP taking these medications      HYDROcodone-acetaminophen 5-325 mg per tablet  Commonly known as: NORCO     potassium chloride 10 MEQ Cpsr  Commonly known as: MICRO-K     WEGOVY 0.25 mg/0.5 mL Pnij  Generic drug: semaglutide (weight loss)              Indwelling Lines/Drains at time of discharge:   Lines/Drains/Airways       Drain  Duration             Female External Urinary Catheter w/ Suction 01/18/25 1730 9 days                    Time spent on the discharge of patient: 35 minutes         Danielle Hoyos PA-C  Department of Hospital Medicine  [facility]

## 2025-01-28 NOTE — CARE UPDATE
01/27/25 1945   Patient Assessment/Suction   Level of Consciousness (AVPU) alert   Respiratory Effort Normal;Unlabored   Expansion/Accessory Muscles/Retractions no use of accessory muscles;no retractions;expansion symmetric   All Lung Fields Breath Sounds clear;diminished   Rhythm/Pattern, Respiratory unlabored;pattern regular;depth regular   PRE-TX-O2   Device (Oxygen Therapy) nasal cannula   $ Is the patient on Low Flow Oxygen? Yes   Flow (L/min) (Oxygen Therapy) 3   SpO2 98 %   Pulse Oximetry Type Intermittent   $ Pulse Oximetry - Multiple Charge Pulse Oximetry - Multiple   Pulse 94   Resp 20   Aerosol Therapy   $ Aerosol Therapy Charges Aerosol Treatment   Daily Review of Necessity (SVN) completed   Respiratory Treatment Status (SVN) given   Treatment Route (SVN) mask;oxygen   Patient Position HOB elevated   Post Treatment Assessment (SVN) increased aeration   Signs of Intolerance (SVN) none   Incentive Spirometer   $ Incentive Spirometer Charges done with encouragement   Incentive Spirometer Predicted Level (mL) 1550   Administration (IS) instruction provided, follow-up   Number of Repetitions (IS) 5   Level Incentive Spirometer (mL) 800   Patient Tolerance (IS) good;no adverse signs/symptoms present   Vibratory PEP Therapy   $ Vibratory PEP Charges Aerobika Therapy   $ Vibratory PEP Equipment Aerobika Equipment   $ Vibratory PEP Tech Time Charges 15 min   Daily Review of Necessity (PEP Therapy) completed   Type (PEP Therapy) vibratory/oscillatory   Device (PEP Therapy) flutter   Route (PEP Therapy) mouthpiece   Breaths per Cycle (PEP Therapy) 10   Cycles (PEP Therapy) 1   PEP Duration (min) 5   Settings (PEP Therapy) PEP 5   Patient Position (PEP Therapy) HOB elevated   Post Treatment Assessment (PEP) breath sounds unchanged   Signs of Intolerance (PEP Therapy) none   Education   $ Education 15 min;Incentive Spirometry;PEP Therapy;Bronchodilator;Oxygen

## 2025-01-28 NOTE — PT/OT/SLP PROGRESS
Occupational Therapy      Patient Name:  Iris Mueller   MRN:  [id number]    Patient not seen today secondary to  (pt discharge to facility). Will follow-up no.    1/28/2025

## 2025-01-29 ENCOUNTER — TELEPHONE (OUTPATIENT)
Dept: FAMILY MEDICINE | Facility: CLINIC | Age: 70
End: 2025-01-29
Payer: MEDICARE

## 2025-01-29 NOTE — TELEPHONE ENCOUNTER
----- Message from Nurse Horner sent at 1/29/2025  1:54 PM CST -----  Call patient - needs post-hospital phone call within 2 business days and hospital follow up visit scheduled within 7-14 days.

## 2025-01-29 NOTE — PHYSICIAN QUERY
Please clarify Encephalopathy associated with the clinical findings.  Encephalopathy, unspecified

## 2025-01-29 NOTE — TELEPHONE ENCOUNTER
Spoke with patient, she is in [place]. She will call when she gets discharged for a follow up.

## 2025-02-01 ENCOUNTER — LAB VISIT (OUTPATIENT)
Dept: LAB | Facility: HOSPITAL | Age: 70
End: 2025-02-01
Attending: FAMILY MEDICINE
Payer: MEDICARE

## 2025-02-01 DIAGNOSIS — D64.9 LOW HEMOGLOBIN: Primary | ICD-10-CM

## 2025-02-01 LAB
HEMOCCULT STL QL IA: NEGATIVE
OB PNL STL: NEGATIVE

## 2025-02-01 PROCEDURE — 82272 OCCULT BLD FECES 1-3 TESTS: CPT | Performed by: FAMILY MEDICINE

## 2025-02-12 ENCOUNTER — DOCUMENT SCAN (OUTPATIENT)
Dept: HOME HEALTH SERVICES | Facility: HOSPITAL | Age: 70
End: 2025-02-12
Payer: MEDICARE

## 2025-02-13 ENCOUNTER — DOCUMENT SCAN (OUTPATIENT)
Dept: HOME HEALTH SERVICES | Facility: HOSPITAL | Age: 70
End: 2025-02-13
Payer: MEDICARE

## 2025-02-17 NOTE — PROGRESS NOTES
Subjective:       Patient ID: Iris Mueller is a 69 y.o. female Body mass index is 39.99 kg/m².    Chief Complaint: Rectal Bleeding (Pt has GI appt occult blood positive. Pt states she has been experiencing difficulty swallowing while drinking liquids. Pt Niece states she has been having chronic diarrhea)    This patient is new to me.  Referring Provider: Ainsleyreferral Self for anemia.     Pt with possible blood loss anemia.  Reports no blood in stool that she notices.   No blood in emesis or mucus.      Review of Systems   Constitutional:  Positive for activity change and fatigue. Negative for appetite change, fever and unexpected weight change.   HENT:  Negative for sore throat and trouble swallowing.    Respiratory:  Negative for cough and shortness of breath.    Cardiovascular:  Negative for chest pain.   Gastrointestinal:  Positive for blood in stool. Negative for abdominal distention, abdominal pain, anal bleeding, constipation, diarrhea, nausea, rectal pain and vomiting.       No LMP recorded. Patient is postmenopausal.  Past Medical History:   Diagnosis Date    Anemia, unspecified     Arthritis     Asthma     COPD (chronic obstructive pulmonary disease)     Encounter for blood transfusion     Hypertension     Obese body habitus     Wound infection 2019    Right knee     Past Surgical History:   Procedure Laterality Date    ANGIOGRAM, CORONARY, WITH LEFT HEART CATHETERIZATION N/A 2022    Procedure: Angiogram, Coronary, with Left Heart Cath;  Surgeon: Jorge Tran MD;  Location: [facility] CATH/EP LAB;  Service: Cardiology;  Laterality: N/A;     SECTION      ECHOCARDIOGRAM,TRANSESOPHAGEAL N/A 2023    Procedure: Transesophageal echo (MARIANO) intra-procedure log documentation;  Surgeon: Araceli Sue Diagnostic;  Location: [facility] EP LAB;  Service: Cardiology;  Laterality: N/A;    INCISION AND DRAINAGE OF HEMATOMA Left 2024    Procedure: INCISION AND DRAINAGE, HEMATOMA;  Surgeon:  Bryson Huerta MD;  Location: [facility] OR;  Service: Orthopedics;  Laterality: Left;    JOINT REPLACEMENT      Knee    KNEE ARTHROPLASTY Right 8/14/2019    Procedure: ARTHROPLASTY, KNEE;  Surgeon: Delio Chung MD;  Location: [facility] OR;  Service: Orthopedics;  Laterality: Right;  anesthesia:  general and block    REPLACEMENT OF WOUND VACUUM-ASSISTED CLOSURE DEVICE Right 9/12/2019    Procedure: REPLACEMENT, WOUND VAC;  Surgeon: Delio Chung MD;  Location: [facility] OR;  Service: Orthopedics;  Laterality: Right;    TONSILLECTOMY      TREATMENT OF CARDIAC ARRHYTHMIA N/A 8/31/2023    Procedure: Cardioversion or Defibrillation;  Surgeon: PJ Betancourt MD;  Location: [facility] EP LAB;  Service: Cardiology;  Laterality: N/A;  AF, MARIANO, DCCV, MAC, EH, 3 Prep    VENTRICULOGRAM, LEFT  12/23/2022    Procedure: Ventriculogram, Left;  Surgeon: Jorge Tran MD;  Location: [facility] CATH/EP LAB;  Service: Cardiology;;     Family History   Problem Relation Name Age of Onset    Alzheimer's disease Mother       Social History[1]  Wt Readings from Last 10 Encounters:   02/20/25 102.4 kg (225 lb 12 oz)   01/19/25 110.2 kg (243 lb)   01/16/25 110.4 kg (243 lb 4.8 oz)   01/14/25 109 kg (240 lb 4.8 oz)   01/06/25 109.1 kg (240 lb 9.6 oz)   12/13/24 110.9 kg (244 lb 7.8 oz)   12/08/24 106 kg (233 lb 11 oz)   10/30/24 96.2 kg (212 lb)   09/10/24 101.6 kg (224 lb)   06/10/24 99.8 kg (220 lb)     Lab Results   Component Value Date    WBC 12.73 (H) 01/28/2025    HGB 8.5 (L) 01/28/2025    HCT 29.0 (L) 01/28/2025    MCV 92 01/28/2025     01/28/2025     CMP  Sodium   Date Value Ref Range Status   01/28/2025 137 136 - 145 mmol/L Final     Potassium   Date Value Ref Range Status   01/28/2025 3.4 (L) 3.5 - 5.1 mmol/L Final     Chloride   Date Value Ref Range Status   01/28/2025 102 95 - 110 mmol/L Final     CO2   Date Value Ref Range Status   01/28/2025 27 23 - 29 mmol/L Final     Glucose   Date Value Ref Range Status   01/28/2025 82 70 -  "110 mg/dL Final     BUN   Date Value Ref Range Status   01/28/2025 32 (H) 8 - 23 mg/dL Final     Creatinine   Date Value Ref Range Status   01/28/2025 1.6 (H) 0.5 - 1.4 mg/dL Final     Calcium   Date Value Ref Range Status   01/28/2025 8.6 (L) 8.7 - 10.5 mg/dL Final     Total Protein   Date Value Ref Range Status   01/28/2025 5.6 (L) 6.0 - 8.4 g/dL Final     Albumin   Date Value Ref Range Status   01/28/2025 3.3 (L) 3.5 - 5.2 g/dL Final     Total Bilirubin   Date Value Ref Range Status   01/28/2025 0.6 0.1 - 1.0 mg/dL Final     Comment:     For infants and newborns, interpretation of results should be based  on gestational age, weight and in agreement with clinical  observations.    Premature Infant recommended reference ranges:  Up to 24 hours.............<8.0 mg/dL  Up to 48 hours............<12.0 mg/dL  3-5 days..................<15.0 mg/dL  6-29 days.................<15.0 mg/dL       Alkaline Phosphatase   Date Value Ref Range Status   01/28/2025 151 (H) 55 - 135 U/L Final     AST   Date Value Ref Range Status   01/28/2025 19 10 - 40 U/L Final     ALT   Date Value Ref Range Status   01/28/2025 25 10 - 44 U/L Final     Anion Gap   Date Value Ref Range Status   01/28/2025 8 8 - 16 mmol/L Final     eGFR if    Date Value Ref Range Status   04/25/2022 52 (L) > OR = 60 mL/min/1.73m2 Final     eGFR if non    Date Value Ref Range Status   04/25/2022 45 (L) > OR = 60 mL/min/1.73m2 Final     No results found for: "AMYLASE"  Lab Results   Component Value Date    LIPASE 11 02/03/2024     No results found for: "LIPASERES"  Lab Results   Component Value Date    TSH 1.371 01/19/2025       Reviewed prior medical records including radiology report of n/a & endoscopy history (see surgical history).    Objective:      Physical Exam  Vitals and nursing note reviewed.   Constitutional:       General: She is not in acute distress.     Appearance: She is not ill-appearing.   HENT:      Head: " Normocephalic and atraumatic.      Mouth/Throat:      Mouth: Mucous membranes are moist.      Pharynx: Oropharynx is clear.   Eyes:      Conjunctiva/sclera: Conjunctivae normal.   Cardiovascular:      Rate and Rhythm: Normal rate and regular rhythm.      Pulses: Normal pulses.   Pulmonary:      Effort: Pulmonary effort is normal. No respiratory distress.   Abdominal:      General: Abdomen is flat. Bowel sounds are normal. There is no distension.      Palpations: Abdomen is soft.      Tenderness: There is no abdominal tenderness.   Skin:     General: Skin is warm and dry.      Capillary Refill: Capillary refill takes 2 to 3 seconds.      Coloration: Skin is pale.   Neurological:      Mental Status: She is alert and oriented to person, place, and time.         Assessment:       1. Anemia, unspecified type    2. Normocytic anemia    3. Acute blood loss anemia        Plan:       Anemia, unspecified type, Normocytic anemia, Acute blood loss anemia  - discussed with patient the different ways that anemia occurs: blood loss (such as from the gi tract), the body is not making enough, or the body is breaking down the rbcs too quickly; recommend EGD and colonoscopy to further evaluate gi tract for possible blood loss and pending results of endoscopies, possible UGI with Small Bowel Follow Through/video capsule study  -follow-up with PCP and/or hematology for continued evaluation and management    Labs today to check iron levels.   Pt defers endoscopy at this time.  -     IRON AND TIBC; Future; Expected date: 02/20/2025  -     Ferritin; Future; Expected date: 02/20/2025    Follow up if symptoms worsen or fail to improve.      If no improvement in symptoms or symptoms worsen, call/follow-up at clinic or go to ER.       JOHNNY Cardoza, KARENPOumarC    Encounter includes face to face time and non-face to face time preparing to see the patient (eg, review of tests), obtaining and/or reviewing separately obtained history,  documenting clinical information in the electronic or other health record, independently interpreting results (not separately reported) and communicating results to the patient/family/caregiver, or care coordination (not separately reported).     A dictation software program was used for this note. Please expect some simple typographical errors in this note.         [1]   Social History  Tobacco Use    Smoking status: Former     Current packs/day: 0.00     Average packs/day: 0.5 packs/day for 50.0 years (23.7 ttl pk-yrs)     Types: Cigarettes     Start date:      Quit date: 2024     Years since quittin.1     Passive exposure: Current    Smokeless tobacco: Never    Tobacco comments:     .   Substance Use Topics    Alcohol use: Yes     Comment: RARELY    Drug use: Never

## 2025-02-18 ENCOUNTER — DOCUMENT SCAN (OUTPATIENT)
Dept: HOME HEALTH SERVICES | Facility: HOSPITAL | Age: 70
End: 2025-02-18
Payer: MEDICARE

## 2025-02-19 PROCEDURE — G0179 MD RECERTIFICATION HHA PT: HCPCS | Mod: ,,, | Performed by: FAMILY MEDICINE

## 2025-02-20 ENCOUNTER — OFFICE VISIT (OUTPATIENT)
Dept: GASTROENTEROLOGY | Facility: CLINIC | Age: 70
End: 2025-02-20
Payer: MEDICARE

## 2025-02-20 VITALS
WEIGHT: 225.75 LBS | BODY MASS INDEX: 40 KG/M2 | SYSTOLIC BLOOD PRESSURE: 133 MMHG | DIASTOLIC BLOOD PRESSURE: 77 MMHG | HEIGHT: 63 IN | HEART RATE: 69 BPM

## 2025-02-20 DIAGNOSIS — D64.9 NORMOCYTIC ANEMIA: ICD-10-CM

## 2025-02-20 DIAGNOSIS — D62 ACUTE BLOOD LOSS ANEMIA: ICD-10-CM

## 2025-02-20 DIAGNOSIS — D64.9 ANEMIA, UNSPECIFIED TYPE: Primary | ICD-10-CM

## 2025-02-25 ENCOUNTER — DOCUMENT SCAN (OUTPATIENT)
Dept: HOME HEALTH SERVICES | Facility: HOSPITAL | Age: 70
End: 2025-02-25
Payer: MEDICARE

## 2025-03-01 DIAGNOSIS — I48.19 PERSISTENT ATRIAL FIBRILLATION: ICD-10-CM

## 2025-03-06 ENCOUNTER — OFFICE VISIT (OUTPATIENT)
Dept: FAMILY MEDICINE | Facility: CLINIC | Age: 70
End: 2025-03-06
Payer: MEDICARE

## 2025-03-06 VITALS
OXYGEN SATURATION: 96 % | BODY MASS INDEX: 38.62 KG/M2 | HEART RATE: 79 BPM | WEIGHT: 218 LBS | SYSTOLIC BLOOD PRESSURE: 130 MMHG | DIASTOLIC BLOOD PRESSURE: 70 MMHG | HEIGHT: 63 IN

## 2025-03-06 DIAGNOSIS — D64.9 ANEMIA, UNSPECIFIED TYPE: ICD-10-CM

## 2025-03-06 DIAGNOSIS — I12.9 HYPERTENSIVE KIDNEY DISEASE WITH STAGE 3B CHRONIC KIDNEY DISEASE: ICD-10-CM

## 2025-03-06 DIAGNOSIS — I48.19 PERSISTENT ATRIAL FIBRILLATION: Primary | ICD-10-CM

## 2025-03-06 DIAGNOSIS — Z87.891 FORMER SMOKER: ICD-10-CM

## 2025-03-06 DIAGNOSIS — E66.01 MORBID OBESITY DUE TO EXCESS CALORIES: ICD-10-CM

## 2025-03-06 DIAGNOSIS — R29.818 SUSPECTED SLEEP APNEA: ICD-10-CM

## 2025-03-06 DIAGNOSIS — N18.32 STAGE 3B CHRONIC KIDNEY DISEASE: ICD-10-CM

## 2025-03-06 DIAGNOSIS — Z12.31 ENCOUNTER FOR SCREENING MAMMOGRAM FOR BREAST CANCER: ICD-10-CM

## 2025-03-06 DIAGNOSIS — J44.1 CHRONIC OBSTRUCTIVE PULMONARY DISEASE WITH ACUTE EXACERBATION: ICD-10-CM

## 2025-03-06 DIAGNOSIS — N18.32 HYPERTENSIVE KIDNEY DISEASE WITH STAGE 3B CHRONIC KIDNEY DISEASE: ICD-10-CM

## 2025-03-06 DIAGNOSIS — I50.33 ACUTE ON CHRONIC HEART FAILURE WITH PRESERVED EJECTION FRACTION: ICD-10-CM

## 2025-03-06 DIAGNOSIS — I35.2 NONRHEUMATIC AORTIC VALVE STENOSIS WITH REGURGITATION: ICD-10-CM

## 2025-03-06 RX ORDER — HYDROCODONE BITARTRATE AND ACETAMINOPHEN 5; 325 MG/1; MG/1
1 TABLET ORAL EVERY 6 HOURS PRN
Qty: 90 TABLET | Refills: 0 | Status: CANCELLED | OUTPATIENT
Start: 2025-03-06

## 2025-03-06 NOTE — TELEPHONE ENCOUNTER
Pt is needing a refill on her [place]. Pt is here today for an office visit. Chart shows last dispense 01/08/2025. UDS 09/10/2024.

## 2025-03-06 NOTE — PROGRESS NOTES
"  SUBJECTIVE:    Patient ID: Iris Mueller is a 69 y.o. female.    Chief Complaint: Follow-up (No bottles//pt is here for a check up and medication refills//Pt decline colo//mammo ordered today//JAIME )      History of Present Illness    CHIEF COMPLAINT:  Iris presents today for follow up of COPD, hypertension, CHF, AFib, and osteoarthritis.pt here with her  today    Since last visit here in January she was readmitted to hospital January 18th for a week with recurrent fall- found to be acutely encephalopathic and somnolent with hypoxia and mild hypercarbia.  She was treated with IV Lasix as well as IV Solu-Medrol for COPD Bilateral carotid ultrasound showed 50-69% bilateral carotid stenosis.  She was discharged to [facility]- reports spent about 2 weeks in SNF and then discharged.    Prior to January hospital admit pt had seen Dr. Tran who referred her to Dr. Ghosh, structural heart clinic for AS. Dr. Ghosh reduced amlodipine dose and changed Lasix to Bumex and added Aldactone.  He ordered a aortic valve calcium score to evaluate severity of aortic valve however doesn't appear that was done as she was admitted a few days later. Dr. Tran had previously ordered sleep study which pt states she has home sleep study device at home but she hasn't worn it overnight since SNF discharge. Has f/u with Dr. Ghosh next month    She reports she now has HH coming out. Reports doing "okay" but c/oing of chronic arthritis pain. Her opioid pain meds were stopped during last hospital stay after recurrent falls and MS changes/respiratory failure. She's been taking tylenol but reports that doesn't help a lot with pain. Pt admits her daughter does not feel she should be taking pain medication either    Recently saw GI for anemia- EGD and C scope were discussed but she declined. Looks like GI ordered labs but they haven't been drawn yet- pt seems confused to what specialists she's seen and what orders/tests have been " recommended.    Pt reports she recently saw DR. Deng for CKD and was told labs were stable    Denies any further falls since SNF discharge. Ambulating with rollator    CARDIOPULMONARY SYMPTOMS:  She reports she's continued to have chest pain and right shoulder pain since her fall a couple months ago and struck her chest. She also c/os of chronic SOB with exertion- she has hx of COPD and has been referred to pulmonary a couple times but has never been seen. Reports leg swelling has been minimal.       SOCIAL HISTORY:  She reports smoking half a cigarette approximately one week ago and states she is making good progress with smoking cessation efforts.      ROS:  General: no fever, no chills, no fatigue, no weight gain, no weight loss  Eyes: no vision changes, no redness, no discharge  ENT: no ear pain, no nasal congestion, no sore throat  Cardiovascular: complains of chest pain, no palpitations, no lower extremity edema  Respiratory: no cough, complains of shortness of breath  Gastrointestinal: no abdominal pain, no nausea, no vomiting, no diarrhea, no constipation, no blood in stool  Genitourinary: no dysuria, no hematuria, no frequency  Musculoskeletal: + bilat knee and back pain, no muscle pain  Skin: no rash, no lesion  Neurological: no headache, complains of dizziness intermittently, no syncope, no numbness, no tingling  Psychiatric: no anxiety, no depression, complains of sleep difficulty              Lab Visit on 02/01/2025   Component Date Value Ref Range Status    Occult Blood 02/01/2025 Negative  Negative Final   No results displayed because visit has over 200 results.      Office Visit on 01/14/2025   Component Date Value Ref Range Status    QRS Duration 01/14/2025 104  ms Final    [facility] QTC Calculation 01/14/2025 499  ms Final   Lab Visit on 01/06/2025   Component Date Value Ref Range Status    Sodium 01/06/2025 138  136 - 145 mmol/L Final    Potassium 01/06/2025 3.4 (L)  3.5 - 5.1 mmol/L Final    Chloride  01/06/2025 102  95 - 110 mmol/L Final    CO2 01/06/2025 26  23 - 29 mmol/L Final    Glucose 01/06/2025 83  70 - 110 mg/dL Final    BUN 01/06/2025 13  8 - 23 mg/dL Final    Creatinine 01/06/2025 1.9 (H)  0.5 - 1.4 mg/dL Final    Calcium 01/06/2025 8.5 (L)  8.7 - 10.5 mg/dL Final    Total Protein 01/06/2025 5.9 (L)  6.0 - 8.4 g/dL Final    Albumin 01/06/2025 3.4 (L)  3.5 - 5.2 g/dL Final    Total Bilirubin 01/06/2025 0.7  0.1 - 1.0 mg/dL Final    Alkaline Phosphatase 01/06/2025 155 (H)  40 - 150 U/L Final    AST 01/06/2025 12  10 - 40 U/L Final    ALT 01/06/2025 10  10 - 44 U/L Final    eGFR 01/06/2025 28 (A)  >60 mL/min/1.73 m^2 Final    Anion Gap 01/06/2025 10  8 - 16 mmol/L Final    BNP 01/06/2025 1,065 (H)  0 - 99 pg/mL Final   No results displayed because visit has over 200 results.      Office Visit on 09/10/2024   Component Date Value Ref Range Status    Amphetamines 09/10/2024 NEGATIVE  <500 ng/mL Final    Barbiturates 09/10/2024 NEGATIVE  <300 ng/mL Final    Benzodiazepines 09/10/2024 NEGATIVE  <100 ng/mL Final    Cocaine Metabolites 09/10/2024 NEGATIVE  <150 ng/mL Final    Methadone 09/10/2024 NEGATIVE  <100 ng/mL Final    Opiates 09/10/2024 POSITIVE (A)  <100 ng/mL Final    Codeine 09/10/2024 NEGATIVE  <50 ng/mL Final    Hydrocodone 09/10/2024 NEGATIVE  <50 ng/mL Final    Hydromorphone 09/10/2024 NEGATIVE  <50 ng/mL Final    Morphine 09/10/2024 NEGATIVE  <50 ng/mL Final    NORHYDROCODONE 09/10/2024 50 (H)  <50 ng/mL Final    Opiates Comments 09/10/2024 SEE COMMENT   Final    Oxycodone 09/10/2024 NEGATIVE  <100 ng/mL Final    Phencyclidine 09/10/2024 NEGATIVE  <25 ng/mL Final    Creatinine 09/10/2024 88.4  > or = 20.0 mg/dL Final    pH 09/10/2024 6.4  4.5 - 9.0 Final    Oxidants, Urine (Tox) 09/10/2024 NEGATIVE  <200 mcg/mL Final    Notes and Comments 09/10/2024 SEE COMMENT   Final       Past Medical History:   Diagnosis Date    Anemia, unspecified     Arthritis     Asthma     COPD (chronic obstructive  pulmonary disease)     Encounter for blood transfusion     Hypertension     Obese body habitus     Wound infection 2019    Right knee     Past Surgical History:   Procedure Laterality Date    ANGIOGRAM, CORONARY, WITH LEFT HEART CATHETERIZATION N/A 2022    Procedure: Angiogram, Coronary, with Left Heart Cath;  Surgeon: Jorge Tran MD;  Location: [facility] CATH/EP LAB;  Service: Cardiology;  Laterality: N/A;     SECTION      ECHOCARDIOGRAM,TRANSESOPHAGEAL N/A 2023    Procedure: Transesophageal echo (MARIANO) intra-procedure log documentation;  Surgeon: Vikram, Araceli Diagnostic;  Location: [facility] EP LAB;  Service: Cardiology;  Laterality: N/A;    INCISION AND DRAINAGE OF HEMATOMA Left 2024    Procedure: INCISION AND DRAINAGE, HEMATOMA;  Surgeon: Bryson Huerta MD;  Location: [facility] OR;  Service: Orthopedics;  Laterality: Left;    JOINT REPLACEMENT      Knee    KNEE ARTHROPLASTY Right 2019    Procedure: ARTHROPLASTY, KNEE;  Surgeon: Delio Chung MD;  Location: [facility] OR;  Service: Orthopedics;  Laterality: Right;  anesthesia:  general and block    REPLACEMENT OF WOUND VACUUM-ASSISTED CLOSURE DEVICE Right 2019    Procedure: REPLACEMENT, WOUND VAC;  Surgeon: Delio Chung MD;  Location: [facility] OR;  Service: Orthopedics;  Laterality: Right;    TONSILLECTOMY      TREATMENT OF CARDIAC ARRHYTHMIA N/A 2023    Procedure: Cardioversion or Defibrillation;  Surgeon: PJ Betancourt MD;  Location: [facility] EP LAB;  Service: Cardiology;  Laterality: N/A;  AF, MARIANO, DCCV, MAC, EH, 3 Prep    VENTRICULOGRAM, LEFT  2022    Procedure: Ventriculogram, Left;  Surgeon: Jorge Tran MD;  Location: [facility] CATH/EP LAB;  Service: Cardiology;;     Family History   Problem Relation Name Age of Onset    Alzheimer's disease Mother         All of your core healthy metrics are met.      The CVD Risk score (SUSU'Agostino, et al., 2008) failed to calculate for the following reasons:    The patient has a  "prior MI, stroke, CHF, or peripheral vascular disease diagnosis     Marital Status:   Alcohol History:  reports current alcohol use.  Tobacco History:  reports that she quit smoking about 2 months ago. Her smoking use included cigarettes. She started smoking about 50 years ago. She has a 23.7 pack-year smoking history. She has been exposed to tobacco smoke. She has never used smokeless tobacco.  Drug History:  reports no history of drug use.    Health Maintenance Topics with due status: Not Due       Topic Last Completion Date    DEXA Scan 12/05/2023    Lipid Panel 12/07/2024    TETANUS VACCINE 12/08/2024     Immunization History   Administered Date(s) Administered    COVID-19 MRNA, LN-S PF (MODERNA HALF 0.25 ML DOSE) 12/14/2021    COVID-19, MRNA, LN-S, PF (MODERNA FULL 0.5 ML DOSE) 02/26/2021, 03/26/2021    Influenza (FLUAD) - Quadrivalent - Adjuvanted - PF *Preferred* (65+) 11/09/2023    Influenza - Quadrivalent - High Dose - PF (65 years and older) 08/20/2020, 10/25/2021, 11/08/2022    Influenza - Trivalent - Fluad - Adjuvanted - PF (65 years and older 01/06/2025    Influenza - Trivalent - Fluzone High Dose - PF (65 years and older) 09/18/2019    PPD Test 09/04/2019, 02/09/2024, 12/09/2024, 01/24/2025    Pneumococcal Conjugate - 13 Valent 03/06/2019    Pneumococcal Polysaccharide - 23 Valent 07/21/2020    Tdap 09/18/2019, 12/08/2024    Tuberculin 12/09/2024, 12/19/2024, 01/24/2025, 01/31/2025       Review of patient's allergies indicates:  No Known Allergies  Current Medications[1]        Objective:      Vitals:    03/06/25 1616   BP: 130/70   Pulse: 79   SpO2: 96%   Weight: 98.9 kg (218 lb)   Height: 5' 3" (1.6 m)       Physical Exam  Vitals and nursing note reviewed.   Constitutional:       General: She is not in acute distress.     Appearance: She is well-developed.      Comments: Morbidly obese white female, appears older than stated age, ambulating with rollator   HENT:      Head: Normocephalic and " atraumatic.      Right Ear: Tympanic membrane and ear canal normal.      Left Ear: Tympanic membrane and ear canal normal.   Neck:      Vascular: No carotid bruit.   Cardiovascular:      Rate and Rhythm: Normal rate. Rhythm irregular.      Heart sounds: Murmur (2nd ICS RSB) heard.      Systolic murmur is present with a grade of 3/6.      No friction rub. No gallop.   Pulmonary:      Effort: Pulmonary effort is normal. No respiratory distress.      Breath sounds: Normal breath sounds. No wheezing or rales.   Abdominal:      Palpations: Abdomen is soft.      Tenderness: There is no abdominal tenderness.   Musculoskeletal:      Cervical back: Neck supple.      Left knee: No erythema. Normal range of motion. No tenderness.      Right lower leg: Edema (trace ankle edema bilat) present.      Left lower leg: Edema present.   Lymphadenopathy:      Cervical: No cervical adenopathy.   Skin:     General: Skin is warm and dry.      Findings: No rash.   Neurological:      General: No focal deficit present.      Mental Status: She is alert and oriented to person, place, and time.      Gait: Gait abnormal (antaglic gait, leans over rollator).   Psychiatric:         Mood and Affect: Mood normal.          Assessment:       1. Persistent atrial fibrillation    2. Chronic obstructive pulmonary disease with acute exacerbation    3. Hypertensive kidney disease with stage 3b chronic kidney disease    4. Stage 3b chronic kidney disease    5. Acute on chronic heart failure with preserved ejection fraction    6. Nonrheumatic aortic valve stenosis with regurgitation    7. Anemia, unspecified type    8. Suspected sleep apnea    9. Encounter for screening mammogram for breast cancer    10. Former smoker    11. Morbid obesity due to excess calories           Assessment & Plan    - Assessed patient's current condition following recent hospitalizations for hypoventilatory respiratory failure, hypercapnia, CHF exacerbation, and AFib  - Evaluated  "risks and benefits of pain medication, determining elevated risk due to documented episodes of altered mental status and respiratory depression  - Considered need for sleep study and pulmonology evaluation to address breathing issues and potential sleep apnea  - Reviewed recent cardiology workup, including pending coronary calcium score ordered by Dr. Ghosh  - Assessed current medication regimen, including recent changes to diuretics by Dr. Ghosh  - Monitored anemia status, awaiting recent lab results from Dr. Deng    ATRIAL FIBRILLATION:  - Monitored patient for heart racing and palpitations.  - Evaluated patient's heart rhythm, noting presence of atrial fibrillation with good rate control.  - Pulse measured at 79 bpm.  - Scheduled for follow-up visit with Dr. Ghosh, cardiologist, next month.      COPD:  -pt advised I recommend referral to pulmonology and needs to complete home sleep study- may need either CPAP or bipap  - Referred the patient to Dr. Ruiz, pulmonologist, for evaluation of COPD.    HYPERTENSIVE CKD STAGE 3B  -BP well controlled    CKD 3B  -Pt reports she had recent f/u and labs with Dr. Deng, will request results    CHRONIC HFpEF  -appears much improved since previous visit and well compensated.  -discussed imp of daily weight monitoring and cardiology f/u  - Educated the patient on the relationship between fluid retention, weight gain, and heart failure management.  - Instructed the patient to continue daily weight monitoring for fluid retention.  - Continued bumetanide 1 mg daily for fluid control.  - Observed minimal swelling in legs.  - Advised caution with salt intake- pt reports going to eat "all you can eat shrimp" today    AORTIC STENOSIS  -reviewed Dr. Ghosh's last note with orders for coronary calcium score to evaluate calcium burden on AV- doesn't appear this test was performed though pt tells me she thinks it was done and told "it was okay"  -f/u with Dr. Ghosh next month as " schedule    ANEMIA  -pt recently saw GI but declined endoscopy  -recommend updated labs as ordered by GI    MORBID OBESITY    PRIMARY OSTEOARTHRITIS INVOLVING MULTIPLE JOINTS  - Explained risks of pain medication use given patient's complex medical history and recent hospitalizations.  - Advised against prescription pain medication due to risks, suggested acetaminophen as an alternative.    LUNG CANCER SCREENING:  -I reviewed with the patient the [place] Preventative Services Task Force Recommendation on Lung Cancer Screening With Low-Dose Computed Tomography.  Patient meets eligibility criteria as per current CMS guidelines for subsequent LDCT lung cancer screening (age 50-80; asymptomatic; tobacco smoking hx of at least 30 pack years; current smoker or one who has quit smoking within the last 15 years).  Benefits and harms of screening discussed with patient, including follow-up diagnostic testing, over-diagnosis, false positive rate, and total radiation exposure.  Patient counseled on the importance of adherence to annual lung cancer LDCT screening, impact of comorbidities and ability or willingness to undergo diagnosis and treatment.  Patient counseled on the importance of maintaining cigarette smoking abstinence if former smoker; or the importance of smoking cessation if current smoker and, if appropriate, furnishing of information about tobacco cessation interventions  All questions answered.   Patient wishes to proceed with continued annual surveillance testing.      SMOKING CESSATION:  - Instructed patient to maintain smoking cessation efforts.  - Noted patient's report of smoking half a cigarette about a week ago.  - Emphasized the importance of continued abstinence from cigarettes.      FOLLOW UP:  - Follow up in 3 months.        Plan:       1. Persistent atrial fibrillation    2. Chronic obstructive pulmonary disease with acute exacerbation  -     Ambulatory referral/consult to Pulmonology; Future; Expected  date: 03/13/2025    3. Hypertensive kidney disease with stage 3b chronic kidney disease    4. Stage 3b chronic kidney disease    5. Acute on chronic heart failure with preserved ejection fraction    6. Nonrheumatic aortic valve stenosis with regurgitation    7. Anemia, unspecified type    8. Suspected sleep apnea    9. Encounter for screening mammogram for breast cancer  -     Mammo Digital Screening Bilat; Future; Expected date: 03/06/2025    10. Former smoker  -     CT Chest Lung Screening Low Dose; Future; Expected date: 03/06/2025    11. Morbid obesity due to excess calories      Follow up in about 3 months (around 6/6/2025) for Dr. You next visit.        Total of 52 minutes spent doing chart review including January hospital stay, multiple consultant notes, history/physical exam and explaining treatment plan    Counseled on age and gender appropriate medical preventative services, including cancer screenings, immunizations, overall nutritional health, need for a consistent exercise regimen and an overall push towards maintaining a vigorous and active lifestyle.      This note was generated with the assistance of ambient listening technology. Verbal consent was obtained by the patient and accompanying visitor(s) for the recording of patient appointment to facilitate this note. I attest to having reviewed and edited the generated note for accuracy, though some syntax or spelling errors may persist. Please contact the author of this note for any clarification.       3/6/2025 Roselyn Cote NP           [1]   Current Outpatient Medications:     albuterol-ipratropium (DUO-NEB) 2.5 mg-0.5 mg/3 mL nebulizer solution, Take 3 mLs by nebulization every 4 (four) hours as needed for Wheezing or Shortness of Breath. Rescue, Disp: 75 mL, Rfl: 0    amiodarone (PACERONE) 200 MG Tab, Take 0.5 tablets (100 mg total) by mouth once daily., Disp: 90 tablet, Rfl: 3    amLODIPine (NORVASC) 5 MG tablet, Take 1 tablet (5 mg total)  by mouth every evening., Disp: 90 tablet, Rfl: 3    apixaban (ELIQUIS) 5 mg Tab, Take 1 tablet (5 mg total) by mouth 2 (two) times daily., Disp: 60 tablet, Rfl: 11    aspirin (ECOTRIN) 81 MG EC tablet, Take 1 tablet (81 mg total) by mouth once daily. (Patient not taking: Reported on 2/20/2025), Disp: 30 tablet, Rfl: 0    atorvastatin (LIPITOR) 10 MG tablet, Take 1 tablet (10 mg total) by mouth every evening., Disp: 90 tablet, Rfl: 3    budesonide-glycopyr-formoterol (BREZTRI AEROSPHERE) 160-9-4.8 mcg/actuation HFAA, Inhale 2 puffs into the lungs 2 (two) times a day., Disp: , Rfl:     bumetanide (BUMEX) 1 MG tablet, Take 1 tablet (1 mg total) by mouth once daily., Disp: 90 tablet, Rfl: 4    buPROPion (WELLBUTRIN SR) 150 MG TBSR 12 hr tablet, Take 1 tablet (150 mg total) by mouth 2 (two) times daily., Disp: 180 tablet, Rfl: 3    FLUoxetine 40 MG capsule, Take 1 capsule (40 mg total) by mouth once daily., Disp: 90 capsule, Rfl: 3    gabapentin (NEURONTIN) 300 MG capsule, Take 1 capsule (300 mg total) by mouth nightly as needed (pain)., Disp: 30 capsule, Rfl: 0    iron polysaccharides (NIFEREX) 150 mg iron Cap, Take 1 capsule (150 mg total) by mouth once daily., Disp: 90 capsule, Rfl: 0    metoprolol succinate (TOPROL-XL) 50 MG 24 hr tablet, Take 1 tablet (50 mg total) by mouth 2 (two) times daily., Disp: 180 tablet, Rfl: 3    mirtazapine (REMERON) 15 MG tablet, Take 1 tablet (15 mg total) by mouth every evening. For sleep, Disp: 30 tablet, Rfl: 3    mupirocin (BACTROBAN) 2 % ointment, Apply topically 2 (two) times daily. To infected skin tear, Disp: 22 g, Rfl: 1    spironolactone (ALDACTONE) 25 MG tablet, Take 1 tablet (25 mg total) by mouth once daily., Disp: 90 tablet, Rfl: 3    triamcinolone acetonide 0.1% (KENALOG) 0.1 % cream, Apply topically 2 (two) times daily. (Patient taking differently: Apply 1 g topically 2 (two) times daily.), Disp: 60 g, Rfl: 2  No current facility-administered medications for this  visit.    Facility-Administered Medications Ordered in Other Visits:     lidocaine (PF) 10 mg/ml (1%) injection 5 mg, 0.5 mL, Intradermal, Once, Azeem Anderson MD

## 2025-03-06 NOTE — PATIENT INSTRUCTIONS
You need to complete the home sleep study    Referral placed to Dr. Ruiz, pulmonologist at [facility]    Recommend you call Dr. Ghosh, cardiology and ask if you need to have the coronary calcium score test performed he had ordered in january

## 2025-03-07 ENCOUNTER — RESULTS FOLLOW-UP (OUTPATIENT)
Dept: FAMILY MEDICINE | Facility: CLINIC | Age: 70
End: 2025-03-07
Payer: MEDICARE

## 2025-03-07 ENCOUNTER — PATIENT OUTREACH (OUTPATIENT)
Dept: ADMINISTRATIVE | Facility: HOSPITAL | Age: 70
End: 2025-03-07
Payer: MEDICARE

## 2025-03-07 ENCOUNTER — TELEPHONE (OUTPATIENT)
Dept: FAMILY MEDICINE | Facility: CLINIC | Age: 70
End: 2025-03-07
Payer: MEDICARE

## 2025-03-07 NOTE — TELEPHONE ENCOUNTER
----- Message from Elkin You MD sent at 3/7/2025  8:25 AM CST -----   Call patient.  Patients  stool test was negative.  No blood was detected.  ----- Message -----  From: Penny Rodriguez LPN  Sent: 3/7/2025   7:46 AM CST  To: lEkin You MD

## 2025-03-07 NOTE — PROGRESS NOTES
Population Health Chart Review & Patient Outreach Details      Additional [facility] Health Notes:               Updates Requested / Reviewed:      Updated Care Coordination Note, Care Everywhere, , External Sources: [facility] and [facility], and Immunizations Reconciliation Completed or Queried: [facility] Topics Overdue:      VB Score: 2     Mammogram  LDCT Lung Screen    Shingles/Zoster Vaccine  RSV Vaccine                  Health Maintenance Topic(s) Outreach Outcomes & Actions Taken:    Breast Cancer Screening - Outreach Outcomes & Actions Taken  : Reminder pt portal message sent.    Colorectal Cancer Screening - Outreach Outcomes & Actions Taken  : External Records Uploaded, Care Team Updated, & History Updated if Applicable

## 2025-03-07 NOTE — TELEPHONE ENCOUNTER
----- Message from Roselyn Cote NP sent at 3/6/2025  4:46 PM CST -----  Please request lab results from [facility] ordered by

## 2025-03-13 ENCOUNTER — DOCUMENT SCAN (OUTPATIENT)
Dept: HOME HEALTH SERVICES | Facility: HOSPITAL | Age: 70
End: 2025-03-13
Payer: MEDICARE

## 2025-03-15 ENCOUNTER — EXTERNAL HOME HEALTH (OUTPATIENT)
Dept: HOME HEALTH SERVICES | Facility: HOSPITAL | Age: 70
End: 2025-03-15
Payer: MEDICARE

## 2025-03-24 DIAGNOSIS — Z00.00 ENCOUNTER FOR MEDICARE ANNUAL WELLNESS EXAM: ICD-10-CM

## 2025-04-03 DIAGNOSIS — N18.32 HYPERTENSIVE KIDNEY DISEASE WITH STAGE 3B CHRONIC KIDNEY DISEASE: Primary | ICD-10-CM

## 2025-04-03 DIAGNOSIS — E87.6 HYPOKALEMIA: ICD-10-CM

## 2025-04-03 DIAGNOSIS — I12.9 HYPERTENSIVE KIDNEY DISEASE WITH STAGE 3B CHRONIC KIDNEY DISEASE: Primary | ICD-10-CM

## 2025-04-03 DIAGNOSIS — I48.19 PERSISTENT ATRIAL FIBRILLATION: ICD-10-CM

## 2025-04-03 RX ORDER — POTASSIUM CHLORIDE 750 MG/1
10 TABLET, EXTENDED RELEASE ORAL DAILY
Qty: 30 TABLET | Refills: 5 | Status: SHIPPED | OUTPATIENT
Start: 2025-04-03

## 2025-04-03 NOTE — TELEPHONE ENCOUNTER
Spoke to pt and she is taking potassium 10  meq daily. Pt is asking if she needs to continue.     Wants eliquis sent to [facility]

## 2025-04-03 NOTE — TELEPHONE ENCOUNTER
----- Message from Chioma sent at 4/3/2025 10:17 AM CDT -----  Patient states pharmacy stated they are unable to fill her potassium. Eliqugunnar has 90 days and patient is unable to afford this day supply. [facility] and [address] [place] [address]- [facility] for 30 days Please contact patient to let her know when complete [phone]

## 2025-04-03 NOTE — TELEPHONE ENCOUNTER
looks like potassium level was in normal range on Feb labs so if she was taking KCL 10meq at that time then seh can continue it. I can't figure out why it was discontinued on med list- just clarify Dr. Deng didn't stop it

## 2025-04-08 NOTE — SUBJECTIVE & OBJECTIVE
Past Medical History:   Diagnosis Date    Anemia, unspecified     Arthritis     Asthma     COPD (chronic obstructive pulmonary disease)     Encounter for blood transfusion     Hypertension     Obese body habitus     Wound infection 2019    Right knee       Past Surgical History:   Procedure Laterality Date    ANGIOGRAM, CORONARY, WITH LEFT HEART CATHETERIZATION N/A 2022    Procedure: Angiogram, Coronary, with Left Heart Cath;  Surgeon: Jorge Tran MD;  Location: [facility] CATH/EP LAB;  Service: Cardiology;  Laterality: N/A;     SECTION      ECHOCARDIOGRAM,TRANSESOPHAGEAL N/A 2023    Procedure: Transesophageal echo (MARIANO) intra-procedure log documentation;  Surgeon: Provider, Dos Diagnostic;  Location: [facility] EP LAB;  Service: Cardiology;  Laterality: N/A;    INCISION AND DRAINAGE OF HEMATOMA Left 2024    Procedure: INCISION AND DRAINAGE, HEMATOMA;  Surgeon: Bryson Huerta MD;  Location: [facility] OR;  Service: Orthopedics;  Laterality: Left;    JOINT REPLACEMENT      Knee    KNEE ARTHROPLASTY Right 2019    Procedure: ARTHROPLASTY, KNEE;  Surgeon: Delio Chung MD;  Location: [facility] OR;  Service: Orthopedics;  Laterality: Right;  anesthesia:  general and block    REPLACEMENT OF WOUND VACUUM-ASSISTED CLOSURE DEVICE Right 2019    Procedure: REPLACEMENT, WOUND VAC;  Surgeon: Delio Chung MD;  Location: [facility] OR;  Service: Orthopedics;  Laterality: Right;    TONSILLECTOMY      TREATMENT OF CARDIAC ARRHYTHMIA N/A 2023    Procedure: Cardioversion or Defibrillation;  Surgeon: PJ Betancourt MD;  Location: [facility] EP LAB;  Service: Cardiology;  Laterality: N/A;  AF, MARIANO, DCCV, MAC, EH, 3 Prep    VENTRICULOGRAM, LEFT  2022    Procedure: Ventriculogram, Left;  Surgeon: Jorge Tran MD;  Location: [facility] CATH/EP LAB;  Service: Cardiology;;       Review of patient's allergies indicates:  No Known Allergies    Current Facility-Administered Medications on File Prior to  Past Medical History:   Diagnosis Date    Asthma     COPD (chronic obstructive pulmonary disease)     Hypertension        Past Surgical History:   Procedure Laterality Date    ANGIOGRAM, CORONARY, WITH LEFT HEART CATHETERIZATION  2025    Procedure: Angiogram, Coronary, with Left Heart Cath;  Surgeon: Jhonny Schofield MD;  Location: [facility] CATH LAB;  Service: Cardiology;;    CARDIAC CATHETERIZATION N/A 2025    Procedure: Cardiac catheterization;  Surgeon: Jhonny Schofield MD;  Location: [facility] CATH LAB;  Service: Cardiology;  Laterality: N/A;     SECTION      HERNIA REPAIR      LEFT HEART CATHETERIZATION Left 2020    Procedure: Left heart cath;  Surgeon: Shabnam Vernon MD;  Location: [facility] CATH LAB;  Service: Cardiology;  Laterality: Left;       Review of patient's allergies indicates:   Allergen Reactions    Flagyl [metronidazole hcl]      Hives      Sulfamethoxazole-trimethoprim Itching    Aspirin Nausea Only    Lipitor [atorvastatin]      Myalgia        Family History       Problem Relation (Age of Onset)    Cancer Mother    Liver disease Father          Tobacco Use    Smoking status: Every Day     Current packs/day: 0.25     Average packs/day: 0.5 packs/day for 51.0 years (25.3 ttl pk-yrs)     Types: Cigarettes     Start date: 1970     Last attempt to quit: 2020    Smokeless tobacco: Never   Substance and Sexual Activity    Alcohol use: Never    Drug use: Never    Sexual activity: Not Currently         Review of Systems   Constitutional:  Positive for activity change and fatigue.   Respiratory:  Positive for cough, chest tightness, shortness of breath and wheezing.    Musculoskeletal:  Negative for arthralgias, back pain and joint swelling.   Neurological:  Negative for dizziness, light-headedness and headaches.     Objective:     Vital Signs (Most Recent):  Temp: 98.2 °F (36.8 °C) (25 1200)  Pulse: 97 (25 1444)  Resp: 18 (25 1200)  BP: 138/84 (25  1200)  SpO2: 97 % (04/08/25 1200) Vital Signs (24h Range):  Temp:  [97.3 °F (36.3 °C)-98.5 °F (36.9 °C)] 98.2 °F (36.8 °C)  Pulse:  [77-97] 97  Resp:  [18-22] 18  SpO2:  [95 %-99 %] 97 %  BP: (115-168)/(78-97) 138/84     Weight: 59.2 kg (130 lb 8 oz)  Body mass index is 29.26 kg/m².      Intake/Output Summary (Last 24 hours) at 4/8/2025 1637  Last data filed at 4/8/2025 1422  Gross per 24 hour   Intake 240 ml   Output 950 ml   Net -710 ml        Physical Exam  Vitals and nursing note reviewed.   Constitutional:       General: She is not in acute distress.     Appearance: She is well-developed.   HENT:      Head: Normocephalic and atraumatic.      Right Ear: External ear normal.      Left Ear: External ear normal.   Cardiovascular:      Rate and Rhythm: Normal rate and regular rhythm.   Pulmonary:      Effort: Tachypnea and accessory muscle usage present. No respiratory distress.      Breath sounds: Wheezing present.   Chest:      Chest wall: No tenderness.   Skin:     General: Skin is warm and dry.   Neurological:      Mental Status: She is alert and oriented to person, place, and time.   Psychiatric:         Thought Content: Thought content normal.              Lines/Drains/Airways       Drain  Duration             Female External Urinary Catheter w/ Suction 04/06/25 1300 2 days              Peripheral Intravenous Line  Duration                  Peripheral IV - Single Lumen 04/04/25 1910 20 G Left Antecubital 3 days                    Significant Labs:    CBC/Anemia Profile:  Recent Labs   Lab 04/07/25  0501 04/08/25  0538   WBC 5.97 6.43   HGB 13.4 12.9   HCT 40.9 39.8    227   MCV 91 91   RDW 13.2 12.9        Chemistries:  Recent Labs   Lab 04/07/25  0501 04/08/25  0538   * 134*   K 3.2* 3.7   CL 92* 92*   CO2 30* 33*   BUN 16 14   CREATININE 0.7 0.7   CALCIUM 8.6* 9.1   GLUCOSE 111* 97   MG 1.5* 1.9   PHOS 3.5 2.9       All pertinent labs within the past 24 hours have been reviewed.    Significant  Encounter   Medication    lidocaine (PF) 10 mg/ml (1%) injection 5 mg     Current Outpatient Medications on File Prior to Encounter   Medication Sig    amiodarone (PACERONE) 200 MG Tab Take 1 tablet (200 mg total) by mouth once daily.    amLODIPine (NORVASC) 2.5 MG tablet Take 1 tablet (2.5 mg total) by mouth every evening.    apixaban (ELIQUIS) 5 mg Tab Take 5 mg by mouth 2 (two) times daily.    atorvastatin (LIPITOR) 10 MG tablet Take 1 tablet (10 mg total) by mouth every evening.    betamethasone dipropionate 0.05 % cream Apply topically 2 (two) times daily.    buPROPion (WELLBUTRIN SR) 150 MG TBSR 12 hr tablet Take 1 tablet (150 mg total) by mouth 2 (two) times daily.    ergocalciferol (ERGOCALCIFEROL) 50,000 unit Cap Take 50,000 Units by mouth every 7 days.    FLUoxetine 40 MG capsule Take 1 capsule (40 mg total) by mouth once daily.    gabapentin (NEURONTIN) 300 MG capsule Take 1 capsule (300 mg total) by mouth 3 (three) times daily.    HYDROcodone-acetaminophen (NORCO) 5-325 mg per tablet Take 1 tablet by mouth every 6 (six) hours as needed for Pain.    HYDROcodone-acetaminophen (NORCO) 5-325 mg per tablet Take 1 tablet by mouth every 6 (six) hours as needed for Pain.    HYDROcodone-acetaminophen (NORCO) 5-325 mg per tablet Take 1 tablet by mouth every 6 (six) hours as needed for Pain.    loratadine (CLARITIN) 10 mg tablet Take 10 mg by mouth once daily.    methocarbamoL (ROBAXIN) 750 MG Tab Take 1 tablet (750 mg total) by mouth nightly as needed (muscle pain/spasms).    metoprolol succinate (TOPROL-XL) 25 MG 24 hr tablet Take 1 tablet (25 mg total) by mouth once daily.    mirtazapine (REMERON) 15 MG tablet Take 1 tablet (15 mg total) by mouth every evening. For sleep    mupirocin (BACTROBAN) 2 % ointment Apply topically 2 (two) times daily. To infected skin tear    traZODone (DESYREL) 50 MG tablet Take 2 tablets (100 mg total) by mouth nightly as needed for Insomnia.    triamcinolone acetonide 0.1% (KENALOG)  0.1 % cream Apply topically 2 (two) times daily.     Family History       Problem Relation (Age of Onset)    Alzheimer's disease Mother          Tobacco Use    Smoking status: Every Day     Current packs/day: 0.25     Average packs/day: 0.5 packs/day for 49.9 years (23.7 ttl pk-yrs)     Types: Cigarettes     Start date: 1975     Passive exposure: Current    Smokeless tobacco: Never    Tobacco comments:     Pt states she is a 46 year smoker, smokes around 5-8 cigarettes per day. Interested in nicotine replacement while admitted. Information given on outpatient smoking cessation.   Substance and Sexual Activity    Alcohol use: Yes     Comment: RARELY    Drug use: Never    Sexual activity: Not Currently     Partners: Male     Review of Systems   Unable to perform ROS: Mental status change     Objective:     Vital Signs (Most Recent):  Temp: 98.1 °F (36.7 °C) (12/07/24 1944)  Pulse: 74 (12/08/24 0200)  Resp: 20 (12/08/24 0200)  BP: (!) 151/72 (12/08/24 0200)  SpO2: 96 % (12/08/24 0200) Vital Signs (24h Range):  Temp:  [98.1 °F (36.7 °C)] 98.1 °F (36.7 °C)  Pulse:  [74-96] 74  Resp:  [14-22] 20  SpO2:  [94 %-100 %] 96 %  BP: (124-224)/() 151/72     Weight: 99.8 kg (220 lb)  Body mass index is 38.97 kg/m².     Physical Exam  Constitutional:       General: She is not in acute distress.     Appearance: She is obese. She is not ill-appearing, toxic-appearing or diaphoretic.   HENT:      Head: Normocephalic and atraumatic.      Mouth/Throat:      Mouth: Mucous membranes are dry.   Eyes:      General: No scleral icterus.        Right eye: No discharge.         Left eye: No discharge.   Neck:      Comments: No retractions; no nuchal rigidity  Cardiovascular:      Rate and Rhythm: Regular rhythm.      Heart sounds: Murmur (3/6 systolic murmur) heard.      No friction rub. No gallop.   Pulmonary:      Effort: Pulmonary effort is normal.      Breath sounds: Normal breath sounds.   Abdominal:      General: Bowel sounds are  Imaging:   I have reviewed all pertinent imaging results/findings within the past 24 hours.   normal. There is no distension.      Palpations: Abdomen is soft. There is no mass.      Tenderness: There is no abdominal tenderness.   Musculoskeletal:      Right lower leg: Edema present.      Left lower leg: Edema present.   Skin:     General: Skin is warm and dry.   Neurological:      Comments: Unable to assess   Psychiatric:      Comments: Unable to assess                Significant studies: Reviewed.  EKG shows, per my interpretation, AFib at 85 bpm with a QTC of 490

## 2025-04-15 ENCOUNTER — TELEPHONE (OUTPATIENT)
Dept: FAMILY MEDICINE | Facility: CLINIC | Age: 70
End: 2025-04-15
Payer: MEDICARE

## 2025-04-15 ENCOUNTER — TELEPHONE (OUTPATIENT)
Dept: CARDIOLOGY | Facility: CLINIC | Age: 70
End: 2025-04-15
Payer: MEDICARE

## 2025-04-15 NOTE — TELEPHONE ENCOUNTER
----- Message from Swetha sent at 4/15/2025  1:41 PM CDT -----  [email or web address] with [email or web address] called to get a update med list faxed [phone]

## 2025-04-15 NOTE — TELEPHONE ENCOUNTER
----- Message from Kadie sent at 4/15/2025 12:35 PM CDT -----  Regarding: advice  Type:  Needs Medical AdviceWho Called: Elvia Call Back Number: [phone] Information: pt st that the eliquis cost to much needs something cheaper.  please call to discuss.

## 2025-04-18 PROBLEM — I50.32 CHRONIC HEART FAILURE WITH PRESERVED EJECTION FRACTION: Status: ACTIVE | Noted: 2025-04-18

## 2025-04-22 ENCOUNTER — LAB REQUISITION (OUTPATIENT)
Dept: LAB | Facility: HOSPITAL | Age: 70
End: 2025-04-22
Payer: MEDICARE

## 2025-04-22 DIAGNOSIS — I50.42 CHRONIC COMBINED SYSTOLIC (CONGESTIVE) AND DIASTOLIC (CONGESTIVE) HEART FAILURE: ICD-10-CM

## 2025-04-22 DIAGNOSIS — I13.0 HYPERTENSIVE HEART AND CHRONIC KIDNEY DISEASE WITH HEART FAILURE AND STAGE 1 THROUGH STAGE 4 CHRONIC KIDNEY DISEASE, OR UNSPECIFIED CHRONIC KIDNEY DISEASE: ICD-10-CM

## 2025-04-22 DIAGNOSIS — I25.10 ATHEROSCLEROTIC HEART DISEASE OF NATIVE CORONARY ARTERY WITHOUT ANGINA PECTORIS: ICD-10-CM

## 2025-04-22 DIAGNOSIS — I48.19 OTHER PERSISTENT ATRIAL FIBRILLATION: ICD-10-CM

## 2025-04-22 DIAGNOSIS — J44.89 OTHER SPECIFIED CHRONIC OBSTRUCTIVE PULMONARY DISEASE: ICD-10-CM

## 2025-04-22 DIAGNOSIS — N18.4 CHRONIC KIDNEY DISEASE, STAGE 4 (SEVERE): ICD-10-CM

## 2025-04-22 DIAGNOSIS — D63.1 ANEMIA IN CHRONIC KIDNEY DISEASE (CODE): ICD-10-CM

## 2025-04-22 DIAGNOSIS — R29.6 REPEATED FALLS: ICD-10-CM

## 2025-04-22 DIAGNOSIS — I27.20 PULMONARY HYPERTENSION, UNSPECIFIED: ICD-10-CM

## 2025-04-22 LAB
ABSOLUTE EOSINOPHIL (SMH): 0.32 K/UL
ABSOLUTE MONOCYTE (SMH): 0.5 K/UL (ref 0.3–1)
ABSOLUTE NEUTROPHIL COUNT (SMH): 5.4 K/UL (ref 1.8–7.7)
ALBUMIN SERPL-MCNC: 3.6 G/DL (ref 3.5–5.2)
ALP SERPL-CCNC: 169 UNIT/L (ref 40–150)
ALT SERPL-CCNC: 18 UNIT/L (ref 10–44)
ANION GAP (SMH): 14 MMOL/L (ref 8–16)
AST SERPL-CCNC: 24 UNIT/L (ref 11–45)
BASOPHILS # BLD AUTO: 0.05 K/UL
BASOPHILS NFR BLD AUTO: 0.7 %
BILIRUB SERPL-MCNC: 0.3 MG/DL (ref 0.1–1)
BNP SERPL-MCNC: 628 PG/ML
BUN SERPL-MCNC: 29 MG/DL (ref 8–23)
CALCIUM SERPL-MCNC: 9.1 MG/DL (ref 8.7–10.5)
CHLORIDE SERPL-SCNC: 100 MMOL/L (ref 95–110)
CHOLEST SERPL-MCNC: 157 MG/DL (ref 120–199)
CHOLEST/HDLC SERPL: 3.5 {RATIO} (ref 2–5)
CO2 SERPL-SCNC: 22 MMOL/L (ref 23–29)
CREAT SERPL-MCNC: 2.2 MG/DL (ref 0.5–1.4)
ERYTHROCYTE [DISTWIDTH] IN BLOOD BY AUTOMATED COUNT: 17.3 % (ref 11.5–14.5)
GFR SERPLBLD CREATININE-BSD FMLA CKD-EPI: 23 ML/MIN/1.73/M2
GLUCOSE SERPL-MCNC: 61 MG/DL (ref 70–110)
HCT VFR BLD AUTO: 35.4 % (ref 37–48.5)
HDLC SERPL-MCNC: 45 MG/DL (ref 40–75)
HDLC SERPL: 28.7 % (ref 20–50)
HGB BLD-MCNC: 10.6 GM/DL (ref 12–16)
IMM GRANULOCYTES # BLD AUTO: 0.03 K/UL (ref 0–0.04)
IMM GRANULOCYTES NFR BLD AUTO: 0.4 % (ref 0–0.5)
LDLC SERPL CALC-MCNC: 91 MG/DL (ref 63–159)
LYMPHOCYTES # BLD AUTO: 0.96 K/UL (ref 1–4.8)
MCH RBC QN AUTO: 26.2 PG (ref 27–31)
MCHC RBC AUTO-ENTMCNC: 29.9 G/DL (ref 32–36)
MCV RBC AUTO: 87 FL (ref 82–98)
NONHDLC SERPL-MCNC: 112 MG/DL
NUCLEATED RBC (/100WBC) (SMH): 0 /100 WBC
PLATELET # BLD AUTO: 339 K/UL (ref 150–450)
PMV BLD AUTO: 8.7 FL (ref 9.2–12.9)
POTASSIUM SERPL-SCNC: 4.3 MMOL/L (ref 3.5–5.1)
PROT SERPL-MCNC: 7.1 GM/DL (ref 6–8.4)
RBC # BLD AUTO: 4.05 M/UL (ref 4–5.4)
RELATIVE EOSINOPHIL (SMH): 4.4 % (ref 0–8)
RELATIVE LYMPHOCYTE (SMH): 13.2 % (ref 18–48)
RELATIVE MONOCYTE (SMH): 6.9 % (ref 4–15)
RELATIVE NEUTROPHIL (SMH): 74.4 % (ref 38–73)
SODIUM SERPL-SCNC: 136 MMOL/L (ref 136–145)
T4 FREE SERPL-MCNC: 1.34 NG/DL (ref 0.71–1.51)
TRIGL SERPL-MCNC: 105 MG/DL (ref 30–150)
TSH SERPL-ACNC: 0.99 UIU/ML (ref 0.4–4)
WBC # BLD AUTO: 7.28 K/UL (ref 3.9–12.7)

## 2025-04-22 PROCEDURE — 84075 ASSAY ALKALINE PHOSPHATASE: CPT | Performed by: FAMILY MEDICINE

## 2025-04-22 PROCEDURE — 83880 ASSAY OF NATRIURETIC PEPTIDE: CPT | Performed by: FAMILY MEDICINE

## 2025-04-22 PROCEDURE — 82465 ASSAY BLD/SERUM CHOLESTEROL: CPT | Performed by: FAMILY MEDICINE

## 2025-04-22 PROCEDURE — 84443 ASSAY THYROID STIM HORMONE: CPT | Performed by: FAMILY MEDICINE

## 2025-04-22 PROCEDURE — 84439 ASSAY OF FREE THYROXINE: CPT | Performed by: FAMILY MEDICINE

## 2025-04-22 PROCEDURE — 85025 COMPLETE CBC W/AUTO DIFF WBC: CPT | Performed by: FAMILY MEDICINE

## 2025-05-05 ENCOUNTER — TELEPHONE (OUTPATIENT)
Dept: FAMILY MEDICINE | Facility: CLINIC | Age: 70
End: 2025-05-05
Payer: MEDICARE

## 2025-05-05 DIAGNOSIS — M17.0 PRIMARY OSTEOARTHRITIS OF BOTH KNEES: ICD-10-CM

## 2025-05-05 RX ORDER — GABAPENTIN 300 MG/1
300 CAPSULE ORAL NIGHTLY PRN
Qty: 30 CAPSULE | Refills: 2 | Status: ON HOLD | OUTPATIENT
Start: 2025-05-05 | End: 2025-05-10 | Stop reason: HOSPADM

## 2025-05-05 RX ORDER — GABAPENTIN 300 MG/1
300 CAPSULE ORAL NIGHTLY PRN
Qty: 30 CAPSULE | Refills: 2 | Status: SHIPPED | OUTPATIENT
Start: 2025-05-05 | End: 2025-05-05

## 2025-05-05 NOTE — TELEPHONE ENCOUNTER
----- Message from Med Assistant Tana sent at 5/5/2025  2:16 PM CDT -----  Patient needs a refill on Gabapentin, she does not know what mg. States she takes it as needed and she needs it now. Would like it sent to [facility] [address]

## 2025-05-06 ENCOUNTER — HOSPITAL ENCOUNTER (INPATIENT)
Facility: HOSPITAL | Age: 70
LOS: 3 days | Discharge: HOME HEALTH | DRG: 683 | End: 2025-05-10
Attending: STUDENT IN AN ORGANIZED HEALTH CARE EDUCATION/TRAINING PROGRAM | Admitting: FAMILY MEDICINE
Payer: MEDICARE

## 2025-05-06 DIAGNOSIS — N17.9 AKI (ACUTE KIDNEY INJURY): Primary | ICD-10-CM

## 2025-05-06 DIAGNOSIS — R06.02 SHORTNESS OF BREATH: ICD-10-CM

## 2025-05-06 DIAGNOSIS — M54.6 ACUTE THORACIC BACK PAIN, UNSPECIFIED BACK PAIN LATERALITY: ICD-10-CM

## 2025-05-06 DIAGNOSIS — R07.9 CHEST PAIN: ICD-10-CM

## 2025-05-06 PROBLEM — E66.9 OBESITY: Status: ACTIVE | Noted: 2025-05-06

## 2025-05-06 PROBLEM — R29.6 RECURRENT FALLS: Status: ACTIVE | Noted: 2025-05-06

## 2025-05-06 PROBLEM — I48.91 ATRIAL FIBRILLATION: Status: ACTIVE | Noted: 2025-05-06

## 2025-05-06 LAB
ABSOLUTE EOSINOPHIL (SMH): 0.01 K/UL
ABSOLUTE MONOCYTE (SMH): 0.76 K/UL (ref 0.3–1)
ABSOLUTE NEUTROPHIL COUNT (SMH): 8.8 K/UL (ref 1.8–7.7)
ALBUMIN SERPL-MCNC: 3.7 G/DL (ref 3.5–5.2)
ALP SERPL-CCNC: 123 UNIT/L (ref 55–135)
ALT SERPL-CCNC: 39 UNIT/L (ref 10–44)
ANION GAP (SMH): 12 MMOL/L (ref 8–16)
AST SERPL-CCNC: 29 UNIT/L (ref 10–40)
BASOPHILS # BLD AUTO: 0.03 K/UL
BASOPHILS NFR BLD AUTO: 0.3 %
BILIRUB SERPL-MCNC: 0.7 MG/DL (ref 0.1–1)
BILIRUB UR QL STRIP.AUTO: NEGATIVE
BNP SERPL-MCNC: 743 PG/ML
BUN SERPL-MCNC: 50 MG/DL (ref 8–23)
CALCIUM SERPL-MCNC: 8.9 MG/DL (ref 8.7–10.5)
CHLORIDE SERPL-SCNC: 95 MMOL/L (ref 95–110)
CLARITY UR: CLEAR
CO2 SERPL-SCNC: 24 MMOL/L (ref 23–29)
COLOR UR AUTO: YELLOW
CREAT SERPL-MCNC: 3.1 MG/DL (ref 0.5–1.4)
ERYTHROCYTE [DISTWIDTH] IN BLOOD BY AUTOMATED COUNT: 17.2 % (ref 11.5–14.5)
GFR SERPLBLD CREATININE-BSD FMLA CKD-EPI: 16 ML/MIN/1.73/M2
GLUCOSE SERPL-MCNC: 102 MG/DL (ref 70–110)
GLUCOSE UR QL STRIP: NEGATIVE
HCT VFR BLD AUTO: 34.4 % (ref 37–48.5)
HGB BLD-MCNC: 10.4 GM/DL (ref 12–16)
HGB UR QL STRIP: NEGATIVE
IMM GRANULOCYTES # BLD AUTO: 0.07 K/UL (ref 0–0.04)
IMM GRANULOCYTES NFR BLD AUTO: 0.6 % (ref 0–0.5)
INR PPP: 1.4 (ref 0.8–1.2)
KETONES UR QL STRIP: NEGATIVE
LEUKOCYTE ESTERASE UR QL STRIP: NEGATIVE
LYMPHOCYTES # BLD AUTO: 1.18 K/UL (ref 1–4.8)
MAGNESIUM SERPL-MCNC: 1.9 MG/DL (ref 1.6–2.6)
MCH RBC QN AUTO: 26.7 PG (ref 27–31)
MCHC RBC AUTO-ENTMCNC: 30.2 G/DL (ref 32–36)
MCV RBC AUTO: 88 FL (ref 82–98)
NITRITE UR QL STRIP: NEGATIVE
NUCLEATED RBC (/100WBC) (SMH): 0 /100 WBC
PH UR STRIP: 6 [PH]
PLATELET # BLD AUTO: 253 K/UL (ref 150–450)
PMV BLD AUTO: 9.4 FL (ref 9.2–12.9)
POTASSIUM SERPL-SCNC: 3.7 MMOL/L (ref 3.5–5.1)
PROT SERPL-MCNC: 6.7 GM/DL (ref 6–8.4)
PROT UR QL STRIP: NEGATIVE
PROTHROMBIN TIME: 15.1 SECONDS (ref 9–12.5)
RBC # BLD AUTO: 3.9 M/UL (ref 4–5.4)
RELATIVE EOSINOPHIL (SMH): 0.1 % (ref 0–8)
RELATIVE LYMPHOCYTE (SMH): 10.9 % (ref 18–48)
RELATIVE MONOCYTE (SMH): 7 % (ref 4–15)
RELATIVE NEUTROPHIL (SMH): 81.1 % (ref 38–73)
SODIUM SERPL-SCNC: 131 MMOL/L (ref 136–145)
SP GR UR STRIP: 1.02
TROPONIN HIGH SENSITIVE (SMH): 22.8 PG/ML
TROPONIN HIGH SENSITIVE (SMH): 23.2 PG/ML
TSH SERPL-ACNC: 0.83 UIU/ML (ref 0.34–5.6)
UROBILINOGEN UR STRIP-ACNC: NEGATIVE EU/DL
WBC # BLD AUTO: 10.82 K/UL (ref 3.9–12.7)

## 2025-05-06 PROCEDURE — 63600175 PHARM REV CODE 636 W HCPCS: Performed by: STUDENT IN AN ORGANIZED HEALTH CARE EDUCATION/TRAINING PROGRAM

## 2025-05-06 PROCEDURE — 96361 HYDRATE IV INFUSION ADD-ON: CPT

## 2025-05-06 PROCEDURE — 36415 COLL VENOUS BLD VENIPUNCTURE: CPT | Performed by: STUDENT IN AN ORGANIZED HEALTH CARE EDUCATION/TRAINING PROGRAM

## 2025-05-06 PROCEDURE — 84443 ASSAY THYROID STIM HORMONE: CPT | Performed by: STUDENT IN AN ORGANIZED HEALTH CARE EDUCATION/TRAINING PROGRAM

## 2025-05-06 PROCEDURE — 93010 ELECTROCARDIOGRAM REPORT: CPT | Mod: ,,, | Performed by: GENERAL PRACTICE

## 2025-05-06 PROCEDURE — 83880 ASSAY OF NATRIURETIC PEPTIDE: CPT | Performed by: EMERGENCY MEDICINE

## 2025-05-06 PROCEDURE — 83735 ASSAY OF MAGNESIUM: CPT | Performed by: EMERGENCY MEDICINE

## 2025-05-06 PROCEDURE — G0378 HOSPITAL OBSERVATION PER HR: HCPCS

## 2025-05-06 PROCEDURE — 87086 URINE CULTURE/COLONY COUNT: CPT | Performed by: INTERNAL MEDICINE

## 2025-05-06 PROCEDURE — 93005 ELECTROCARDIOGRAM TRACING: CPT | Performed by: GENERAL PRACTICE

## 2025-05-06 PROCEDURE — 84484 ASSAY OF TROPONIN QUANT: CPT | Performed by: EMERGENCY MEDICINE

## 2025-05-06 PROCEDURE — 81003 URINALYSIS AUTO W/O SCOPE: CPT | Performed by: STUDENT IN AN ORGANIZED HEALTH CARE EDUCATION/TRAINING PROGRAM

## 2025-05-06 PROCEDURE — 85610 PROTHROMBIN TIME: CPT | Performed by: STUDENT IN AN ORGANIZED HEALTH CARE EDUCATION/TRAINING PROGRAM

## 2025-05-06 PROCEDURE — 80053 COMPREHEN METABOLIC PANEL: CPT | Performed by: EMERGENCY MEDICINE

## 2025-05-06 PROCEDURE — 85025 COMPLETE CBC W/AUTO DIFF WBC: CPT | Performed by: EMERGENCY MEDICINE

## 2025-05-06 PROCEDURE — 84484 ASSAY OF TROPONIN QUANT: CPT | Mod: 91 | Performed by: STUDENT IN AN ORGANIZED HEALTH CARE EDUCATION/TRAINING PROGRAM

## 2025-05-06 PROCEDURE — 99285 EMERGENCY DEPT VISIT HI MDM: CPT | Mod: 25

## 2025-05-06 RX ORDER — SODIUM CHLORIDE 9 MG/ML
INJECTION, SOLUTION INTRAVENOUS CONTINUOUS
Status: DISCONTINUED | OUTPATIENT
Start: 2025-05-06 | End: 2025-05-09

## 2025-05-06 RX ORDER — SODIUM,POTASSIUM PHOSPHATES 280-250MG
2 POWDER IN PACKET (EA) ORAL
Status: DISCONTINUED | OUTPATIENT
Start: 2025-05-06 | End: 2025-05-10 | Stop reason: HOSPADM

## 2025-05-06 RX ORDER — METOPROLOL SUCCINATE 50 MG/1
50 TABLET, EXTENDED RELEASE ORAL 2 TIMES DAILY
Status: DISCONTINUED | OUTPATIENT
Start: 2025-05-06 | End: 2025-05-06

## 2025-05-06 RX ORDER — ATORVASTATIN CALCIUM 10 MG/1
10 TABLET, FILM COATED ORAL NIGHTLY
Status: DISCONTINUED | OUTPATIENT
Start: 2025-05-06 | End: 2025-05-06

## 2025-05-06 RX ORDER — ACETAMINOPHEN 325 MG/1
650 TABLET ORAL EVERY 8 HOURS PRN
Status: DISCONTINUED | OUTPATIENT
Start: 2025-05-06 | End: 2025-05-06

## 2025-05-06 RX ORDER — HYDROCODONE BITARTRATE AND ACETAMINOPHEN 5; 325 MG/1; MG/1
1 TABLET ORAL EVERY 6 HOURS PRN
Refills: 0 | Status: DISCONTINUED | OUTPATIENT
Start: 2025-05-06 | End: 2025-05-10 | Stop reason: HOSPADM

## 2025-05-06 RX ORDER — TALC
6 POWDER (GRAM) TOPICAL NIGHTLY PRN
Status: DISCONTINUED | OUTPATIENT
Start: 2025-05-06 | End: 2025-05-10 | Stop reason: HOSPADM

## 2025-05-06 RX ORDER — ALUMINUM HYDROXIDE, MAGNESIUM HYDROXIDE, AND SIMETHICONE 1200; 120; 1200 MG/30ML; MG/30ML; MG/30ML
30 SUSPENSION ORAL 4 TIMES DAILY PRN
Status: DISCONTINUED | OUTPATIENT
Start: 2025-05-06 | End: 2025-05-10 | Stop reason: HOSPADM

## 2025-05-06 RX ORDER — AMLODIPINE BESYLATE 2.5 MG/1
2.5 TABLET ORAL NIGHTLY
Status: DISCONTINUED | OUTPATIENT
Start: 2025-05-06 | End: 2025-05-06

## 2025-05-06 RX ORDER — ONDANSETRON HYDROCHLORIDE 2 MG/ML
4 INJECTION, SOLUTION INTRAVENOUS EVERY 6 HOURS PRN
Status: DISCONTINUED | OUTPATIENT
Start: 2025-05-06 | End: 2025-05-10 | Stop reason: HOSPADM

## 2025-05-06 RX ORDER — LANOLIN ALCOHOL/MO/W.PET/CERES
800 CREAM (GRAM) TOPICAL
Status: DISCONTINUED | OUTPATIENT
Start: 2025-05-06 | End: 2025-05-10 | Stop reason: HOSPADM

## 2025-05-06 RX ORDER — PANTOPRAZOLE SODIUM 40 MG/1
40 TABLET, DELAYED RELEASE ORAL
Status: DISCONTINUED | OUTPATIENT
Start: 2025-05-07 | End: 2025-05-10 | Stop reason: HOSPADM

## 2025-05-06 RX ORDER — CEFTRIAXONE 1 G/1
1 INJECTION, POWDER, FOR SOLUTION INTRAMUSCULAR; INTRAVENOUS ONCE
Status: COMPLETED | OUTPATIENT
Start: 2025-05-06 | End: 2025-05-07

## 2025-05-06 RX ORDER — AMIODARONE HYDROCHLORIDE 100 MG/1
100 TABLET ORAL DAILY
Status: DISCONTINUED | OUTPATIENT
Start: 2025-05-07 | End: 2025-05-10 | Stop reason: HOSPADM

## 2025-05-06 RX ORDER — ACETAMINOPHEN 325 MG/1
650 TABLET ORAL EVERY 4 HOURS PRN
Status: DISCONTINUED | OUTPATIENT
Start: 2025-05-06 | End: 2025-05-10 | Stop reason: HOSPADM

## 2025-05-06 RX ORDER — NALOXONE HCL 0.4 MG/ML
0.02 VIAL (ML) INJECTION
Status: DISCONTINUED | OUTPATIENT
Start: 2025-05-06 | End: 2025-05-10 | Stop reason: HOSPADM

## 2025-05-06 RX ORDER — ARFORMOTEROL TARTRATE 15 UG/2ML
15 SOLUTION RESPIRATORY (INHALATION) 2 TIMES DAILY
Status: DISCONTINUED | OUTPATIENT
Start: 2025-05-07 | End: 2025-05-10 | Stop reason: HOSPADM

## 2025-05-06 RX ORDER — BUDESONIDE 0.5 MG/2ML
1 INHALANT ORAL EVERY 12 HOURS
Status: DISCONTINUED | OUTPATIENT
Start: 2025-05-07 | End: 2025-05-10 | Stop reason: HOSPADM

## 2025-05-06 RX ORDER — MIDODRINE HYDROCHLORIDE 2.5 MG/1
5 TABLET ORAL ONCE
Status: COMPLETED | OUTPATIENT
Start: 2025-05-06 | End: 2025-05-07

## 2025-05-06 RX ORDER — IPRATROPIUM BROMIDE 0.5 MG/2.5ML
0.5 SOLUTION RESPIRATORY (INHALATION) EVERY 6 HOURS
Status: DISCONTINUED | OUTPATIENT
Start: 2025-05-07 | End: 2025-05-07

## 2025-05-06 RX ORDER — FUROSEMIDE 40 MG/1
40 TABLET ORAL DAILY
Status: ON HOLD | COMMUNITY
End: 2025-05-10 | Stop reason: HOSPADM

## 2025-05-06 RX ORDER — METOPROLOL SUCCINATE 50 MG/1
50 TABLET, EXTENDED RELEASE ORAL DAILY
Status: DISCONTINUED | OUTPATIENT
Start: 2025-05-07 | End: 2025-05-06

## 2025-05-06 RX ADMIN — SODIUM CHLORIDE, POTASSIUM CHLORIDE, SODIUM LACTATE AND CALCIUM CHLORIDE 1000 ML: 600; 310; 30; 20 INJECTION, SOLUTION INTRAVENOUS at 09:05

## 2025-05-06 NOTE — ED PROVIDER NOTES
Encounter Date: 5/6/2025       History     Chief Complaint   Patient presents with    Fatigue    Fall     3 times today, hematoma above right eye    Weakness     HPI  69-year-old presenting with multiple symptoms.  Has been and daughter at bedside.  Has been inpatient lived together but has been that has almost completely blind in his also severely hearing impaired.  Entire family provides history.  Patient has chronic back pain but has been off of all of her medications for many months as it was said that the medications were causing her to have altered mental status in the past.  She has been doing well but she said that  she had tried gabapentin for the 1st time in many months for her back.  She said she felt awful after taking it and fell altered therefore was trying to sleep it off.  She is not confused last night per has been but this morning she is confused.  Patient made phone calls to multiple people and was not slurring her words but had incorrect information such as asking why their grandchildren were in their house when they were not in her asking within neighbors children were coming over when the neighbor reported the the children were at somebody else's house.  She did not have any inability to use her extremities or her face or facial droop or vision changes.  She said that she fell 3 times today but is unsure why she fell and she fell directly on her bottom but she does have a developing black eye in the right periorbital area and does not remember hitting her face.  It is unclear for how long she is confused this morning but it might have been multiple hours.  She is not having any dysuria urinary frequency but is agreeable to urine catheterization to check for UTI.  She denies any recent fevers, chills, cough, congestion, sore throat, chest pain, shortness of breath, nausea, vomiting, abdominal pain, rash, swelling.  She says currently she is feels her chronic back pain which is not worse than  normal now.  It is somewhat unclear if her back pain has been worse and that is why she tried gabapentin for the 1st time last evening or not.  She has upper back pain and right lateral back pain chronically  Review of patient's allergies indicates:  No Known Allergies  Past Medical History:   Diagnosis Date    Anemia, unspecified     Arthritis     Asthma     COPD (chronic obstructive pulmonary disease)     Encounter for blood transfusion     Hypertension     Obese body habitus     Wound infection 2019    Right knee     Past Surgical History:   Procedure Laterality Date    ANGIOGRAM, CORONARY, WITH LEFT HEART CATHETERIZATION N/A 2022    Procedure: Angiogram, Coronary, with Left Heart Cath;  Surgeon: Jorge Tran MD;  Location: [facility] CATH/EP LAB;  Service: Cardiology;  Laterality: N/A;     SECTION      ECHOCARDIOGRAM,TRANSESOPHAGEAL N/A 2023    Procedure: Transesophageal echo (MARIANO) intra-procedure log documentation;  Surgeon: Vikram, Araceli Diagnostic;  Location: [facility] EP LAB;  Service: Cardiology;  Laterality: N/A;    INCISION AND DRAINAGE OF HEMATOMA Left 2024    Procedure: INCISION AND DRAINAGE, HEMATOMA;  Surgeon: Bryson Huerta MD;  Location: [facility] OR;  Service: Orthopedics;  Laterality: Left;    JOINT REPLACEMENT      Knee    KNEE ARTHROPLASTY Right 2019    Procedure: ARTHROPLASTY, KNEE;  Surgeon: Delio Chung MD;  Location: [facility] OR;  Service: Orthopedics;  Laterality: Right;  anesthesia:  general and block    REPLACEMENT OF WOUND VACUUM-ASSISTED CLOSURE DEVICE Right 2019    Procedure: REPLACEMENT, WOUND VAC;  Surgeon: Delio Chung MD;  Location: [facility] OR;  Service: Orthopedics;  Laterality: Right;    TONSILLECTOMY      TREATMENT OF CARDIAC ARRHYTHMIA N/A 2023    Procedure: Cardioversion or Defibrillation;  Surgeon: PJ Betancourt MD;  Location: [facility] EP LAB;  Service: Cardiology;  Laterality: N/A;  AF, MARIANO, DCCV, MAC, EH, 3 Prep    VENTRICULOGRAM,  LEFT  12/23/2022    Procedure: Ventriculogram, Left;  Surgeon: Jorge Tran MD;  Location: [facility] CATH/EP LAB;  Service: Cardiology;;     Family History   Problem Relation Name Age of Onset    Alzheimer's disease Mother       Social History[1]  Review of Systems  See hpi   Physical Exam     Initial Vitals   BP Pulse Resp Temp SpO2   05/06/25 1651 05/06/25 1651 05/06/25 1651 05/07/25 0030 05/06/25 1651   106/72 95 (!) 22 98.6 °F (37 °C) 97 %      MAP       --                Physical Exam    Nursing note and vitals reviewed.  Constitutional: She appears well-developed. She is not diaphoretic.   Elevated bmi   HENT:   Head: Normocephalic.   Ecchymosis R periorbital area    Eyes: Conjunctivae and EOM are normal. Pupils are equal, round, and reactive to light. Right eye exhibits no discharge. Left eye exhibits no discharge. No scleral icterus.   Neck: Neck supple. No tracheal deviation present.   Cardiovascular:  Normal rate and regular rhythm.           Pulmonary/Chest: Breath sounds normal. No stridor. No respiratory distress. She has no wheezes. She has no rhonchi. She has no rales.   Prolonged expiration per pt and daughter this is baseline. On nc, uses at home   Abdominal: Abdomen is soft. She exhibits no distension. There is no abdominal tenderness. There is no rebound and no guarding.   Musculoskeletal:         General: No edema (bilat lower legs).      Cervical back: Neck supple.      Comments: Bilat knee incision scars,remote, healed well, L incision more pink/prominent than R     Neurological: She is alert and oriented to person, place, and time. No cranial nerve deficit.   Skin: Skin is warm and dry.         ED Course   Procedures  Labs Reviewed   COMPREHENSIVE METABOLIC PANEL - Abnormal       Result Value    Sodium 131 (*)     Potassium 3.7      Chloride 95      CO2 24      Glucose 102      BUN 50 (*)     Creatinine 3.1 (*)     Calcium 8.9      Protein Total 6.7      Albumin 3.7      Bilirubin Total 0.7             AST 29      ALT 39      Anion Gap 12      eGFR 16 (*)    TROPONIN I HIGH SENSITIVITY - Abnormal    Troponin High Sensitive 23.2 (*)    B-TYPE NATRIURETIC PEPTIDE - Abnormal     (*)    CBC WITH DIFFERENTIAL - Abnormal    WBC 10.82      RBC 3.90 (*)     Hgb 10.4 (*)     Hct 34.4 (*)     MCV 88      MCH 26.7 (*)     MCHC 30.2 (*)     RDW 17.2 (*)     Platelet Count 253      MPV 9.4      Nucleated RBC 0      Neut % 81.1 (*)     Lymph % 10.9 (*)     Mono % 7.0      Eos % 0.1      Basophil % 0.3      Imm Grans % 0.6 (*)     Neut # 8.8 (*)     Lymph # 1.18      Mono # 0.76      Eos # 0.01      Baso # 0.03      Imm Grans # 0.07 (*)    PROTIME-INR - Abnormal    PT 15.1 (*)     INR 1.4 (*)    TROPONIN I HIGH SENSITIVITY - Abnormal    Troponin High Sensitive 22.8 (*)    MAGNESIUM - Normal    Magnesium 1.9     URINALYSIS, REFLEX TO URINE CULTURE - Normal    Color, UA Yellow      Appearance, UA Clear      Spec Grav UA 1.020      pH, UA 6.0      Protein, UA Negative      Glucose, UA Negative      Ketones, UA Negative      Blood, UA Negative      Bilirubin, UA Negative      Urobilinogen, UA Negative      Nitrites, UA Negative      Leukocyte Esterase, UA Negative     TSH - Normal    TSH 0.829     CULTURE, URINE   CBC W/ AUTO DIFFERENTIAL    Narrative:     The following orders were created for panel order CBC auto differential.  Procedure                               Abnormality         Status                     ---------                               -----------         ------                     CBC with Differential[[id number]]       Abnormal            Final result                 Please view results for these tests on the individual orders.   EXTRA TUBES   EXTRA TUBES    Narrative:     The following orders were created for panel order EXTRA TUBES.  Procedure                               Abnormality         Status                     ---------                               -----------         ------                      Light Blue Top Hold[[id number]]                             In process                 Gold Top Hold[[id number]]                                   In process                   Please view results for these tests on the individual orders.   LIGHT BLUE TOP HOLD   GOLD TOP HOLD   POCT CREATININE          Imaging Results              CT Cervical Spine Without Contrast (Final result)  Result time 05/06/25 19:53:22      Final result by Marce Narvaez MD (05/06/25 19:53:22)                   Impression:      No acute findings      Electronically signed by: Marce Narvaez  Date:    05/06/2025  Time:    19:53               Narrative:    EXAMINATION:  CT CERVICAL SPINE WITHOUT CONTRAST    CLINICAL HISTORY:  Neck trauma (Age >= 65y);    TECHNIQUE:  Low dose axial images, sagittal and coronal reformations were performed though the cervical spine.  Contrast was not administered.    COMPARISON:  12/07/2024    FINDINGS:  Reversal of the normal cervical lordosis.  Osteopenia. No detectable acute fracture. Minimal anterolisthesis at C2-3. Moderate to advanced multilevel spondylosis. Up to moderate central canal stenosis. Severe bilateral foraminal stenosis at multiple levels.  Small right thyroid nodule can be further characterized with nonemergent ultrasound                                       CT Head Without Contrast (Final result)  Result time 05/06/25 19:28:42      Final result by Marce Narvaez MD (05/06/25 19:28:42)                   Impression:      No acute findings      Electronically signed by: Marce Narvaez  Date:    05/06/2025  Time:    19:28               Narrative:    EXAMINATION:  CT HEAD WITHOUT CONTRAST    CLINICAL HISTORY:  Head trauma, minor (Age >= 65y);    TECHNIQUE:  Low dose axial images were obtained through the head.  Coronal and sagittal reformations were also performed. Contrast was not administered.    COMPARISON:  CT brain 01/18/2025    FINDINGS:  Intracranial  compartment:    Ventricles and sulci are normal in size for age without evidence of hydrocephalus. No extra-axial blood or fluid collections.    Moderate chronic microvascular ischemia.  No parenchymal mass, hemorrhage, edema or major vascular distribution infarct.    Skull/extracranial contents (limited evaluation): No fracture. Mastoid air cells and paranasal sinuses are essentially clear.                                       X-Ray Chest AP Portable (Final result)  Result time 05/06/25 19:08:47      Final result by Marce Narvaez MD (05/06/25 19:08:47)                   Impression:      As above      Electronically signed by: Marce Narvaez  Date:    05/06/2025  Time:    19:08               Narrative:    EXAMINATION:  XR CHEST AP PORTABLE    CLINICAL HISTORY:  CHF;    TECHNIQUE:  Single frontal view of the chest was performed.    COMPARISON:  01/27/2025    FINDINGS:  Chronic interstitial changes. No focal consolidation.  Enlarged cardiac silhouette.  Atherosclerosis of the aortic arch.                                       Medications   0.9% NaCl infusion ( Intravenous New Bag 5/7/25 0030)   amiodarone tablet 100 mg (has no administration in time range)   apixaban tablet 5 mg (5 mg Oral Given 5/7/25 0056)   melatonin tablet 6 mg (has no administration in time range)   ondansetron injection 4 mg (has no administration in time range)   aluminum-magnesium hydroxide-simethicone 200-200-20 mg/5 mL suspension 30 mL (has no administration in time range)   acetaminophen tablet 650 mg (has no administration in time range)   HYDROcodone-acetaminophen 5-325 mg per tablet 1 tablet (has no administration in time range)   naloxone 0.4 mg/mL injection 0.02 mg (has no administration in time range)   potassium bicarbonate disintegrating tablet 50 mEq (has no administration in time range)   potassium bicarbonate disintegrating tablet 35 mEq (has no administration in time range)   potassium bicarbonate disintegrating  tablet 60 mEq (has no administration in time range)   magnesium oxide tablet 800 mg (has no administration in time range)   magnesium oxide tablet 800 mg (has no administration in time range)   potassium, sodium phosphates 280-160-250 mg packet 2 packet (has no administration in time range)   potassium, sodium phosphates 280-160-250 mg packet 2 packet (has no administration in time range)   potassium, sodium phosphates 280-160-250 mg packet 2 packet (has no administration in time range)   pantoprazole EC tablet 40 mg (has no administration in time range)   budesonide nebulizer solution 1 mg (has no administration in time range)     And   arformoteroL nebulizer solution 15 mcg (has no administration in time range)   ipratropium 0.02 % nebulizer solution 0.5 mg (has no administration in time range)   lactated ringers bolus 1,000 mL (0 mLs Intravenous Stopped 5/6/25 2304)   cefTRIAXone injection 1 g (1 g Intravenous Given 5/7/25 0056)   midodrine tablet 5 mg (5 mg Oral Given 5/7/25 0056)     Medical Decision Making  Amount and/or Complexity of Data Reviewed  Labs: ordered.    On my independent interpretation EKG with rate of 74,  no sign of acute occlusion myocardial infarction, twi avr, III, avf, v2,-v6, only twi in avr present on previous all others new       On my independent interpretation labs CBC, CMP, BNP, troponin, TSH, Mag, urinalysis within acceptable limits or near patient's baseline except for INR 1.4, creatinine 3.1, , troponin 23    See above for full report but per radiology CT head, C-spine, chest x-ray within acceptable limits or near patient's baseline      Discussed with the patient that due to the episode of confusion as well as multiple falls today without patient's recollection as to why she is falling as well as her EKG changes that I would recommend admission for observation further evaluation for high-risk syncope (pt does not remember, could have syncopized, has head trauma, does not  know why).  In addition patient needs appropriate pain management plan for chronic back pain.  Recommended pain doctor as well as PM&R and gave patient referrals.  Lost any to be evaluated inpatient for appropriate pain plan that we will not make patient altered or confused right increased risk of falling.  May need dose adjustments based on her kidney function etcetera.  Discussed with the patient and her family.  Patient's INR is also not in therapeutic range which needs to be discussed (recently transitioned from eliquis to warfarin due to financial reasons per EMR review).  Nursing documented blood pressure of 87/66 but reported that afterwards rechecked after repositioning of cuff and it had remained in the 1 100s to 110s therefore I think this was an anomaly and not true hypotension.  Patient was alert and oriented and not confused throughout ED stay.  Discussed patient with admitting physician Dr. Patrice Ashton return precautions discussed    Penny Hollis MD  Emergency Medicine Staff Physician                                      Clinical Impression:  Final diagnoses:  [R06.02] Shortness of breath  [M54.6] Acute thoracic back pain, unspecified back pain laterality (Primary)  [N17.9] JEFF (acute kidney injury)          ED Disposition Condition    Observation                     [1]   Social History  Tobacco Use    Smoking status: Former     Current packs/day: 0.00     Average packs/day: 0.5 packs/day for 50.0 years (23.7 ttl pk-yrs)     Types: Cigarettes     Start date:      Quit date: 2024     Years since quittin.3     Passive exposure: Current    Smokeless tobacco: Never    Tobacco comments:     .   Substance Use Topics    Alcohol use: Yes     Comment: RARELY    Drug use: Never        Penny Hollis MD  [phone]

## 2025-05-07 LAB
ABSOLUTE EOSINOPHIL (SMH): 0.13 K/UL
ABSOLUTE MONOCYTE (SMH): 0.81 K/UL (ref 0.3–1)
ABSOLUTE NEUTROPHIL COUNT (SMH): 6.3 K/UL (ref 1.8–7.7)
ALBUMIN SERPL-MCNC: 3.3 G/DL (ref 3.5–5.2)
ALP SERPL-CCNC: 115 UNIT/L (ref 55–135)
ALT SERPL-CCNC: 39 UNIT/L (ref 10–44)
ANION GAP (SMH): 12 MMOL/L (ref 8–16)
AST SERPL-CCNC: 30 UNIT/L (ref 10–40)
BASOPHILS # BLD AUTO: 0.02 K/UL
BASOPHILS NFR BLD AUTO: 0.2 %
BILIRUB SERPL-MCNC: 0.6 MG/DL (ref 0.1–1)
BUN SERPL-MCNC: 52 MG/DL (ref 8–23)
CALCIUM SERPL-MCNC: 8.7 MG/DL (ref 8.7–10.5)
CHLORIDE SERPL-SCNC: 99 MMOL/L (ref 95–110)
CO2 SERPL-SCNC: 22 MMOL/L (ref 23–29)
CREAT SERPL-MCNC: 3.1 MG/DL (ref 0.5–1.4)
ERYTHROCYTE [DISTWIDTH] IN BLOOD BY AUTOMATED COUNT: 17.2 % (ref 11.5–14.5)
FERRITIN SERPL-MCNC: 47.7 NG/ML (ref 20–300)
GFR SERPLBLD CREATININE-BSD FMLA CKD-EPI: 16 ML/MIN/1.73/M2
GLUCOSE SERPL-MCNC: 94 MG/DL (ref 70–110)
HCT VFR BLD AUTO: 33.8 % (ref 37–48.5)
HGB BLD-MCNC: 9.8 GM/DL (ref 12–16)
IMM GRANULOCYTES # BLD AUTO: 0.06 K/UL (ref 0–0.04)
IMM GRANULOCYTES NFR BLD AUTO: 0.7 % (ref 0–0.5)
IRON SATN MFR SERPL: <10 % (ref 20–50)
IRON SERPL-MCNC: 36 UG/DL (ref 30–160)
LACTATE SERPL-SCNC: 0.8 MMOL/L (ref 0.5–1.9)
LYMPHOCYTES # BLD AUTO: 1.19 K/UL (ref 1–4.8)
MAGNESIUM SERPL-MCNC: 1.9 MG/DL (ref 1.6–2.6)
MCH RBC QN AUTO: 26.6 PG (ref 27–31)
MCHC RBC AUTO-ENTMCNC: 29 G/DL (ref 32–36)
MCV RBC AUTO: 92 FL (ref 82–98)
NUCLEATED RBC (/100WBC) (SMH): 0 /100 WBC
PLATELET # BLD AUTO: 196 K/UL (ref 150–450)
PMV BLD AUTO: 8.9 FL (ref 9.2–12.9)
POTASSIUM SERPL-SCNC: 4.1 MMOL/L (ref 3.5–5.1)
PROT SERPL-MCNC: 6 GM/DL (ref 6–8.4)
RBC # BLD AUTO: 3.69 M/UL (ref 4–5.4)
RELATIVE EOSINOPHIL (SMH): 1.5 % (ref 0–8)
RELATIVE LYMPHOCYTE (SMH): 13.9 % (ref 18–48)
RELATIVE MONOCYTE (SMH): 9.5 % (ref 4–15)
RELATIVE NEUTROPHIL (SMH): 74.2 % (ref 38–73)
SODIUM SERPL-SCNC: 133 MMOL/L (ref 136–145)
TIBC SERPL-MCNC: 389 UG/DL (ref 250–450)
TRANSFERRIN SERPL-MCNC: 278 MG/DL (ref 200–375)
TROPONIN HIGH SENSITIVE (SMH): 16.1 PG/ML
WBC # BLD AUTO: 8.55 K/UL (ref 3.9–12.7)

## 2025-05-07 PROCEDURE — 25000003 PHARM REV CODE 250: Performed by: INTERNAL MEDICINE

## 2025-05-07 PROCEDURE — 63600175 PHARM REV CODE 636 W HCPCS: Performed by: INTERNAL MEDICINE

## 2025-05-07 PROCEDURE — 83605 ASSAY OF LACTIC ACID: CPT | Performed by: INTERNAL MEDICINE

## 2025-05-07 PROCEDURE — 94640 AIRWAY INHALATION TREATMENT: CPT

## 2025-05-07 PROCEDURE — 85025 COMPLETE CBC W/AUTO DIFF WBC: CPT | Performed by: INTERNAL MEDICINE

## 2025-05-07 PROCEDURE — 11000001 HC ACUTE MED/SURG PRIVATE ROOM

## 2025-05-07 PROCEDURE — 96361 HYDRATE IV INFUSION ADD-ON: CPT

## 2025-05-07 PROCEDURE — 82728 ASSAY OF FERRITIN: CPT | Performed by: FAMILY MEDICINE

## 2025-05-07 PROCEDURE — 80053 COMPREHEN METABOLIC PANEL: CPT | Performed by: INTERNAL MEDICINE

## 2025-05-07 PROCEDURE — 97116 GAIT TRAINING THERAPY: CPT

## 2025-05-07 PROCEDURE — 99900035 HC TECH TIME PER 15 MIN (STAT)

## 2025-05-07 PROCEDURE — 99900031 HC PATIENT EDUCATION (STAT)

## 2025-05-07 PROCEDURE — 36415 COLL VENOUS BLD VENIPUNCTURE: CPT | Performed by: FAMILY MEDICINE

## 2025-05-07 PROCEDURE — 83735 ASSAY OF MAGNESIUM: CPT | Performed by: INTERNAL MEDICINE

## 2025-05-07 PROCEDURE — 97535 SELF CARE MNGMENT TRAINING: CPT

## 2025-05-07 PROCEDURE — 94799 UNLISTED PULMONARY SVC/PX: CPT

## 2025-05-07 PROCEDURE — 99406 BEHAV CHNG SMOKING 3-10 MIN: CPT

## 2025-05-07 PROCEDURE — 25000242 PHARM REV CODE 250 ALT 637 W/ HCPCS: Performed by: INTERNAL MEDICINE

## 2025-05-07 PROCEDURE — 36415 COLL VENOUS BLD VENIPUNCTURE: CPT | Performed by: INTERNAL MEDICINE

## 2025-05-07 PROCEDURE — 27000221 HC OXYGEN, UP TO 24 HOURS

## 2025-05-07 PROCEDURE — 96374 THER/PROPH/DIAG INJ IV PUSH: CPT

## 2025-05-07 PROCEDURE — 94761 N-INVAS EAR/PLS OXIMETRY MLT: CPT

## 2025-05-07 PROCEDURE — 12000002 HC ACUTE/MED SURGE SEMI-PRIVATE ROOM

## 2025-05-07 PROCEDURE — 84466 ASSAY OF TRANSFERRIN: CPT | Performed by: FAMILY MEDICINE

## 2025-05-07 PROCEDURE — 97165 OT EVAL LOW COMPLEX 30 MIN: CPT

## 2025-05-07 PROCEDURE — 84484 ASSAY OF TROPONIN QUANT: CPT | Performed by: INTERNAL MEDICINE

## 2025-05-07 PROCEDURE — 97162 PT EVAL MOD COMPLEX 30 MIN: CPT

## 2025-05-07 RX ORDER — IPRATROPIUM BROMIDE 0.5 MG/2.5ML
0.5 SOLUTION RESPIRATORY (INHALATION) EVERY 6 HOURS
Status: DISCONTINUED | OUTPATIENT
Start: 2025-05-07 | End: 2025-05-10 | Stop reason: HOSPADM

## 2025-05-07 RX ADMIN — HYDROCODONE BITARTRATE AND ACETAMINOPHEN 1 TABLET: 5; 325 TABLET ORAL at 08:05

## 2025-05-07 RX ADMIN — HYDROCODONE BITARTRATE AND ACETAMINOPHEN 1 TABLET: 5; 325 TABLET ORAL at 09:05

## 2025-05-07 RX ADMIN — ARFORMOTEROL TARTRATE 15 MCG: 15 SOLUTION RESPIRATORY (INHALATION) at 07:05

## 2025-05-07 RX ADMIN — PANTOPRAZOLE SODIUM 40 MG: 40 TABLET, DELAYED RELEASE ORAL at 06:05

## 2025-05-07 RX ADMIN — CEFTRIAXONE 1 G: 1 INJECTION, POWDER, FOR SOLUTION INTRAMUSCULAR; INTRAVENOUS at 12:05

## 2025-05-07 RX ADMIN — APIXABAN 5 MG: 5 TABLET, FILM COATED ORAL at 12:05

## 2025-05-07 RX ADMIN — IPRATROPIUM BROMIDE 0.5 MG: 0.5 SOLUTION RESPIRATORY (INHALATION) at 01:05

## 2025-05-07 RX ADMIN — SODIUM CHLORIDE: 9 INJECTION, SOLUTION INTRAVENOUS at 12:05

## 2025-05-07 RX ADMIN — HYDROCODONE BITARTRATE AND ACETAMINOPHEN 1 TABLET: 5; 325 TABLET ORAL at 03:05

## 2025-05-07 RX ADMIN — MIDODRINE HYDROCHLORIDE 5 MG: 2.5 TABLET ORAL at 12:05

## 2025-05-07 RX ADMIN — APIXABAN 5 MG: 5 TABLET, FILM COATED ORAL at 09:05

## 2025-05-07 RX ADMIN — SODIUM CHLORIDE: 9 INJECTION, SOLUTION INTRAVENOUS at 05:05

## 2025-05-07 RX ADMIN — IPRATROPIUM BROMIDE 0.5 MG: 0.5 SOLUTION RESPIRATORY (INHALATION) at 07:05

## 2025-05-07 RX ADMIN — BUDESONIDE INHALATION 1 MG: 0.5 SUSPENSION RESPIRATORY (INHALATION) at 07:05

## 2025-05-07 RX ADMIN — APIXABAN 5 MG: 5 TABLET, FILM COATED ORAL at 08:05

## 2025-05-07 RX ADMIN — AMIODARONE HYDROCHLORIDE 100 MG: 100 TABLET ORAL at 08:05

## 2025-05-07 RX ADMIN — SODIUM CHLORIDE: 9 INJECTION, SOLUTION INTRAVENOUS at 06:05

## 2025-05-07 NOTE — ASSESSMENT & PLAN NOTE
[id number] Score: 3. The patients heart rate in the last 24 hours is as follows:  Pulse  Min: 70  Max: 95     Antiarrhythmics  amiodarone tablet 100 mg, Daily, Oral    Anticoagulants  apixaban tablet 5 mg, 2 times daily, Oral

## 2025-05-07 NOTE — PT/OT/SLP EVAL
"Occupational Therapy   Evaluation, tx    Name: Iris Mueller  MRN: [id number]  Admitting Diagnosis: JEFF (acute kidney injury)  Recent Surgery: * No surgery found *    The primary encounter diagnosis was Acute thoracic back pain, unspecified back pain laterality. Diagnoses of Shortness of breath, JEFF (acute kidney injury), and Chest pain were also pertinent to this visit.    Recommendations:     Discharge Recommendations: Low Intensity Therapy  Discharge Equipment Recommendations:  none  Barriers to discharge:  None    Assessment:     Iris Mueller is a 69 y.o. female with a medical diagnosis of JEFF (acute kidney injury).  She presents with  Performance deficits affecting function: weakness, impaired endurance, impaired self care skills, impaired functional mobility, gait instability, impaired balance, decreased lower extremity function, decreased safety awareness, pain, decreased coordination, impaired cardiopulmonary response to activity.      Rehab Prognosis: Good; patient would benefit from acute skilled OT services to address these deficits and reach maximum level of function.       Plan:     Patient to be seen 5 x/week to address the above listed problems via self-care/home management, therapeutic activities, therapeutic exercises  Plan of Care Expires: 06/07/25  Plan of Care Reviewed with: patient    Subjective     Chief Complaint: LLE pain  Patient/Family Comments/goals: I don;t feel great."     Occupational Profile:  Living Environment: pt lives w/ spouse who is blind in [facility] w/ n steps; t/s combo w/ TTB.  Previous level of function: Indep-Mod I self care act, ambulated Mo I w; Rollator; drives, cooks, cleans. Grocery delivered to home.   Roles and Routines: HH. Caregiver to spouse.   Equipment Used at Home: bath bench, walker, rolling, rollator  Assistance upon Discharge: daughter and spouse(limited because spouse is blind).    Pain/Comfort:  Pain Rating 1: 10/10  Location - Side 1: " Left  Location - Orientation 1: generalized  Location 1: leg (from hip down to ankle)  Pain Addressed 1: Reposition, Distraction, Nurse notified, Cessation of Activity  Pain Rating Post-Intervention 1: 10/10    Patients cultural, spiritual, Rastafarian conflicts given the current situation: no    Objective:     Communicated with: nurse prior to session.  Patient found HOB elevated with bed alarm, telemetry, oxygen, peripheral IV, PureWick upon OT entry to room.    General Precautions: Standard, fall (fluid restriction)  Orthopedic Precautions: N/A  Braces: N/A  Respiratory Status: Nasal cannula, flow 2 L/min    Occupational Performance:    Bed Mobility:    Patient completed Rolling/Turning to Left with  contact guard assistance  Patient completed Scooting/Bridging with moderate assistance  Patient completed Supine to Sit with minimum assistance  Patient completed Sit to Supine with moderate assistance    Functional Mobility/Transfers:  Patient completed Sit <> Stand Transfer with minimum assistance  with  rolling walker   Patient completed Bed <> Chair Transfer using Step Transfer technique with contact guard assistance with rolling walker  Patient completed Toilet Transfer Step Transfer technique with contact guard assistance with  rolling walker  Functional Mobility: ambulated 10ft CGA w RW x 2 trials bed<>toilet.    Activities of Daily Living:  Feeding:  independence   Grooming: stand by assistance to wash face and brush teeth  seated BS chair  Lower Body Dressing: stand by assistance socks seated chair  Toileting: minimum assistance clothes management in  stance w/ RW    Cognitive/Visual Perceptual:  Cognitive/Psychosocial Skills:     -       Oriented to: Person, Place, and Time   -       Follows Commands/attention:Follows two-step commands  -       Communication: dysarthria  -       Memory: Poor immediate recall  -       Safety awareness/insight to disability: impaired   -       Mood/Affect/Coping  skills/emotional control: Cooperative  Visual/Perceptual:      -Intact grossly    Physical Exam:  Balance:    -       sitting: good  dynamic: fair plus  standing; fair  dynamic: poor plus  Postural examination/scapula alignment:    -       Rounded shoulders  Skin integrity: Bruising of R above/around eye   Edema:  Mild BLE  Dominant hand:    -       right  Upper Extremity Range of Motion:     -       Right Upper Extremity: WFL  -       Left Upper Extremity: WFL  Upper Extremity Strength:    -       Right Upper Extremity: WFL  -       Left Upper Extremity: WFL   Strength:    -       Right Upper Extremity: WFL  -       Left Upper Extremity: WFL    Resting tremors mild      AMPAC 6 Click ADL:  AMPAC Total Score: 20    Treatment & Education:  Pt seen for safe self care activities and fx mobility retraining as stated above.  All questions/concerns addressed within scope.  Call staff for BTB/OOB assist. Call bell use explained. Pt acknowledged.  Purpose of OT and POC      Patient left HOB elevated with all lines intact, call button in reach, bed alarm on, and nurse notified    GOALS:   Multidisciplinary Problems       Occupational Therapy Goals          Problem: Occupational Therapy    Goal Priority Disciplines Outcome Interventions   Occupational Therapy Goal     OT, PT/OT Progressing    Description: Goals to be met by: 6/7/2025     Patient will increase functional independence with ADLs by performing:    UE Dressing with Modified [place].  LE Dressing with Modified [place].  Grooming while standing at sink with Modified [place].  Toileting from toilet with Modified [place] for hygiene and clothing management.   Supine to sit with Modified [place].  Step transfer with Modified [place]  Toilet transfer to toilet with Modified [place].                         DME Justifications:  No DME recommended requiring DME justifications    History:     Past Medical History:   Diagnosis Date     Anemia, unspecified     Arthritis     Asthma     COPD (chronic obstructive pulmonary disease)     Encounter for blood transfusion     Hypertension     Obese body habitus     Wound infection 2019    Right knee         Past Surgical History:   Procedure Laterality Date    ANGIOGRAM, CORONARY, WITH LEFT HEART CATHETERIZATION N/A 2022    Procedure: Angiogram, Coronary, with Left Heart Cath;  Surgeon: Jorge Tran MD;  Location: [facility] CATH/EP LAB;  Service: Cardiology;  Laterality: N/A;     SECTION      ECHOCARDIOGRAM,TRANSESOPHAGEAL N/A 2023    Procedure: Transesophageal echo (MARIANO) intra-procedure log documentation;  Surgeon: Provider, Dosc Diagnostic;  Location: [facility] EP LAB;  Service: Cardiology;  Laterality: N/A;    INCISION AND DRAINAGE OF HEMATOMA Left 2024    Procedure: INCISION AND DRAINAGE, HEMATOMA;  Surgeon: Bryson Huerta MD;  Location: [facility] OR;  Service: Orthopedics;  Laterality: Left;    JOINT REPLACEMENT      Knee    KNEE ARTHROPLASTY Right 2019    Procedure: ARTHROPLASTY, KNEE;  Surgeon: Delio Chung MD;  Location: [facility] OR;  Service: Orthopedics;  Laterality: Right;  anesthesia:  general and block    REPLACEMENT OF WOUND VACUUM-ASSISTED CLOSURE DEVICE Right 2019    Procedure: REPLACEMENT, WOUND VAC;  Surgeon: Delio Chung MD;  Location: [facility] OR;  Service: Orthopedics;  Laterality: Right;    TONSILLECTOMY      TREATMENT OF CARDIAC ARRHYTHMIA N/A 2023    Procedure: Cardioversion or Defibrillation;  Surgeon: PJ Betancourt MD;  Location: [facility] EP LAB;  Service: Cardiology;  Laterality: N/A;  AF, MARIANO, DCCV, MAC, EH, 3 Prep    VENTRICULOGRAM, LEFT  2022    Procedure: Ventriculogram, Left;  Surgeon: Jorge Tran MD;  Location: [facility] CATH/EP LAB;  Service: Cardiology;;       Time Tracking:     OT Date of Treatment: 25  OT Start Time: 927  OT Stop Time: 957  OT Total Time (min): 30 min    Billable Minutes:Evaluation 10  Self Care/Home  Management 20  Total Time 30    5/7/2025

## 2025-05-07 NOTE — ASSESSMENT & PLAN NOTE
[id number] Score: 3. The patients heart rate in the last 24 hours is as follows:  Pulse  Min: 95  Max: 95     Antiarrhythmics  amiodarone tablet 100 mg, Daily, Oral  metoprolol succinate (TOPROL-XL) 24 hr tablet 50 mg, Daily, Oral    Anticoagulants  apixaban tablet 5 mg, 2 times daily, Oral

## 2025-05-07 NOTE — PROGRESS NOTES
[facility] Medicine  Progress Note    Patient Name: Iris Mueller  MRN: [id number]  Patient Class: IP- Inpatient   Admission Date: 5/6/2025  Length of Stay: 0 days  Attending Physician: Titus Garrett DO  Primary Care Provider: Elkin You MD        Subjective     Principal Problem:JEFF (acute kidney injury)        HPI:  69 year old pt getting admitted with recurrent falls /JEFF  Pt also c/o back pain but she said its been there since she had back surgery long time back  Because of the falls she came to ER and found to have JEFF and got admitted  She was sleepy and was little confused upon presentation  Her condition improved with iv fluids and she was no longer confused when saw in ER  Pt denied any other issues or c/o    Overview/Hospital Course:  The patient was admitted to the hospital medicine service and closely monitored for her recurrent falls and acute kidney injury.  The patient was maintained on IV fluids.  Nephrology was consulted.  Nephrotoxic medications were held.  Nephrology was consulted.  Imaging of head neck and shoulder were negative for acute findings.  PT and OT were consulted.    Interval History:  Patient evaluated at bedside.  She is drowsy but answers questions appropriately.  She reports that she just got some pain medication.  Denies any complaints at present.  Creatinine 3.1 this a.m..  Nephrology was consulted.    Review of Systems   Constitutional:  Positive for activity change. Negative for appetite change, chills, diaphoresis and fever.   Respiratory:  Negative for shortness of breath.    Cardiovascular:  Negative for chest pain.   Gastrointestinal:  Negative for abdominal pain, anal bleeding, diarrhea, nausea and vomiting.     Objective:     Vital Signs (Most Recent):  Temp: 97.9 °F (36.6 °C) (05/07/25 1227)  Pulse: 79 (05/07/25 1317)  Resp: 19 (05/07/25 1317)  BP: 102/69 (05/07/25 1227)  SpO2: 97 % (05/07/25 1317) Vital Signs (24h  Range):  Temp:  [97.9 °F (36.6 °C)-98.6 °F (37 °C)] 97.9 °F (36.6 °C)  Pulse:  [70-95] 79  Resp:  [18-22] 19  SpO2:  [95 %-98 %] 97 %  BP: ()/(58-76) 102/69     Weight: 85.5 kg (188 lb 7.9 oz)  Body mass index is 33.39 kg/m².    Intake/Output Summary (Last 24 hours) at 5/7/2025 1327  Last data filed at 5/7/2025 1241  Gross per 24 hour   Intake 480 ml   Output --   Net 480 ml         Physical Exam  Constitutional:       Appearance: Normal appearance.   HENT:      Head: Normocephalic.      Mouth/Throat:      Mouth: Mucous membranes are moist.      Pharynx: Oropharynx is clear.   Eyes:      Extraocular Movements: Extraocular movements intact.   Cardiovascular:      Rate and Rhythm: Normal rate and regular rhythm.      Pulses: Normal pulses.      Heart sounds: Normal heart sounds.   Pulmonary:      Effort: Pulmonary effort is normal.      Breath sounds: Normal breath sounds.   Abdominal:      General: Bowel sounds are normal. There is no distension.      Palpations: Abdomen is soft.      Tenderness: There is no abdominal tenderness. There is no guarding.   Skin:     Capillary Refill: Capillary refill takes less than 2 seconds.   Neurological:      Mental Status: She is alert and oriented to person, place, and time.   Psychiatric:         Mood and Affect: Mood normal.               Significant Labs: All pertinent labs within the past 24 hours have been reviewed.    Significant Imaging: I have reviewed all pertinent imaging results/findings within the past 24 hours.      Assessment & Plan  JEFF (acute kidney injury)  Maintain iv fluids  Stop all nephrotoxins    Latest Reference Range & Units 01/27/25 06:21 01/28/25 06:48 04/22/25 01:17 05/06/25 19:12   Creatinine 0.5 - 1.4 mg/dL 1.6 (H) 1.6 (H) 2.2 (H) 3.1 (H)   (H): Data is abnormally high  Recurrent falls  PT/OT  CT brain/CT Cervical spine WNL    CKD (chronic kidney disease) stage 4, GFR 15-29 ml/min  Creatine stable for now. BMP reviewed- noted Estimated  Creatinine Clearance: 17.2 mL/min (A) (based on SCr of 3.1 mg/dL (H)). according to latest data. Based on current GFR, CKD stage is stage 4 - GFR 15-29.  Monitor UOP and serial BMP and adjust therapy as needed. Renally dose meds. Avoid nephrotoxic medications and procedures.  Chronic obstructive pulmonary disease  Patient's COPD is controlled currently.  Patient is currently off COPD Pathway. Continue scheduled inhalers Nebs, Steroids, and Supplemental oxygen and monitor respiratory status closely.   Chronic combined systolic and diastolic heart failure  Stable  Hold Torsemide and aldactone at the moment in view with JEFF  Doesn't look hypervolemic at the moment     Coronary artery disease involving native coronary artery of native heart without angina pectoris  Stable   Obesity  Body mass index is 33.39 kg/m². Morbid obesity complicates all aspects of disease management from diagnostic modalities to treatment.  Atrial fibrillation  [id number] Score: 3. The patients heart rate in the last 24 hours is as follows:  Pulse  Min: 70  Max: 95     Antiarrhythmics  amiodarone tablet 100 mg, Daily, Oral    Anticoagulants  apixaban tablet 5 mg, 2 times daily, Oral    Hypertension  Patient's blood pressure range in the last 24 hours was: BP  Min: 87/66  Max: 114/74.The patient's inpatient anti-hypertensive regimen is listed below:  Current Antihypertensives  , Daily, Oral    Plan  - BP is controlled, no changes needed to their regimen  - holding antihypertensives at present given JEFF with hypotension.  Adjust as needed.  VTE Risk Mitigation (From admission, onward)           Ordered     apixaban tablet 5 mg  2 times daily         05/06/25 2151     IP VTE HIGH RISK PATIENT  Once         05/06/25 2151     Place sequential compression device  Until discontinued         05/06/25 2151                    Discharge Planning   VALERIANO: 5/10/2025     Code Status: Full Code   Medical Readiness for Discharge Date:   Discharge Plan A: Home  Health                Please place Justification for DME        MANDO Quijano  Department of Hospital Medicine   [facility]

## 2025-05-07 NOTE — CONSULTS
INPATIENT NEPHROLOGY CONSULT   [facility]    Patient Name: Iris Mueller  Date: 05/07/2025    Reason for consultation:    Chief Complaint:   Chief Complaint   Patient presents with    Fatigue    Fall     3 times today, hematoma above right eye    Weakness       History of Present Illness:  69 year old pt getting admitted with recurrent falls /JEFF. Pt also c/o back pain but she said its been there since she had back surgery long time back. Because of the falls she came to ER and found to have JEFF and got admitted.She was sleepy and was little confused upon presentation. Her condition improved with iv fluids and she was no longer confused. Currently in observation. Consulted for JEFF.    Interval History:  5/7- hypotensive at admission, on 2L NC, SCr 2.2 in April, up to 3.1 today- on NS 100cc/hr, UA bland,  (628), trop flat, lactic acid normal, CXR clear, CT head/C spine neg    Plan of Care:    Assessment:  JEFF/CKD III  Baseline severe AS with CHF  Baseline HTN  Baseline AF  Hyponatremia  SHPT  Anemia of CKD    Plan:    - suspect JEFF from hypotension/volume depletion- continue NS 100cc/hr- hold bumex, aldactone  - compensated on exam right now- 2g salt, 1.5-2L fluid restriction  - defer metoprolol, norvasc to HM  - on amio, eliquis  - trend serum Na with IVFs  - no active BMM issues  - recheck iron stores to see if she needs iron or DENEEN    Thank you for allowing us to participate in this patient's care. We will continue to follow.    Vital Signs:  Temp Readings from Last 3 Encounters:   05/07/25 98 °F (36.7 °C) (Oral)   01/28/25 98.2 °F (36.8 °C) (Oral)   12/17/24 97.6 °F (36.4 °C) (Oral)       Pulse Readings from Last 3 Encounters:   05/07/25 79   03/06/25 79   02/20/25 69       BP Readings from Last 3 Encounters:   05/07/25 104/70   04/18/25 115/70   03/06/25 130/70       Weight:  Wt Readings from Last 3 Encounters:   05/07/25 85.5 kg (188 lb 7.9 oz)   04/18/25 89.4 kg (197 lb  1.6 oz)   25 98.9 kg (218 lb)       INS/OUTS:  No intake/output data recorded.  I/O this shift:  In: 240 [P.O.:240]  Out: -     Past Medical & Surgical History:  Past Medical History:   Diagnosis Date    Anemia, unspecified     Arthritis     Asthma     COPD (chronic obstructive pulmonary disease)     Encounter for blood transfusion     Hypertension     Obese body habitus     Wound infection 2019    Right knee       Past Surgical History:   Procedure Laterality Date    ANGIOGRAM, CORONARY, WITH LEFT HEART CATHETERIZATION N/A 2022    Procedure: Angiogram, Coronary, with Left Heart Cath;  Surgeon: Jorge Tran MD;  Location: [facility] CATH/EP LAB;  Service: Cardiology;  Laterality: N/A;     SECTION      ECHOCARDIOGRAM,TRANSESOPHAGEAL N/A 2023    Procedure: Transesophageal echo (MARIANO) intra-procedure log documentation;  Surgeon: Provider, Dosc Diagnostic;  Location: [facility] EP LAB;  Service: Cardiology;  Laterality: N/A;    INCISION AND DRAINAGE OF HEMATOMA Left 2024    Procedure: INCISION AND DRAINAGE, HEMATOMA;  Surgeon: Bryson Huerta MD;  Location: [facility] OR;  Service: Orthopedics;  Laterality: Left;    JOINT REPLACEMENT      Knee    KNEE ARTHROPLASTY Right 2019    Procedure: ARTHROPLASTY, KNEE;  Surgeon: Delio Chung MD;  Location: [facility] OR;  Service: Orthopedics;  Laterality: Right;  anesthesia:  general and block    REPLACEMENT OF WOUND VACUUM-ASSISTED CLOSURE DEVICE Right 2019    Procedure: REPLACEMENT, WOUND VAC;  Surgeon: Delio Chung MD;  Location: [facility] OR;  Service: Orthopedics;  Laterality: Right;    TONSILLECTOMY      TREATMENT OF CARDIAC ARRHYTHMIA N/A 2023    Procedure: Cardioversion or Defibrillation;  Surgeon: PJ Betancourt MD;  Location: [facility] EP LAB;  Service: Cardiology;  Laterality: N/A;  AF, MARIANO, DCCV, MAC, EH, 3 Prep    VENTRICULOGRAM, LEFT  2022    Procedure: Ventriculogram, Left;  Surgeon: Jorge Tran MD;  Location: [facility]  CATH/EP LAB;  Service: Cardiology;;       Past Social History:  Social History     Socioeconomic History    Marital status:    Tobacco Use    Smoking status: Former     Current packs/day: 0.00     Average packs/day: 0.5 packs/day for 50.0 years (23.7 ttl pk-yrs)     Types: Cigarettes     Start date:      Quit date: 2024     Years since quittin.3     Passive exposure: Current    Smokeless tobacco: Never    Tobacco comments:     .   Substance and Sexual Activity    Alcohol use: Yes     Comment: RARELY    Drug use: Never    Sexual activity: Not Currently     Partners: Male     Social Drivers of Health     Financial Resource Strain: Patient Declined (2025)    Overall Financial Resource Strain (CARDIA)     Difficulty of Paying Living Expenses: Patient declined   Food Insecurity: Patient Declined (2025)    Hunger Vital Sign     Worried About Running Out of Food in the Last Year: Patient declined     Ran Out of Food in the Last Year: Patient declined   Transportation Needs: Patient Declined (2025)    PRAPARE - Transportation     Lack of Transportation (Medical): Patient declined     Lack of Transportation (Non-Medical): Patient declined   Physical Activity: Patient Declined (2022)    Exercise Vital Sign     Days of Exercise per Week: Patient declined     Minutes of Exercise per Session: Patient declined   Stress: Patient Declined (2025)    [employer] - Occupational Stress Questionnaire     Feeling of Stress : Patient declined   Housing Stability: Patient Declined (2025)    Housing Stability Vital Sign     Unable to Pay for Housing in the Last Year: Patient declined     Homeless in the Last Year: Patient declined       Medications:  Scheduled Meds:   amiodarone  100 mg Oral Daily    apixaban  5 mg Oral BID    budesonide  1 mg Nebulization Q12H    And    arformoteroL  15 mcg Nebulization BID    ipratropium  0.5 mg Nebulization Q6H    pantoprazole   "40 mg Oral Before breakfast     Continuous Infusions:   0.9% NaCl   Intravenous Continuous 100 mL/hr at 05/07/25 0030 New Bag at 05/07/25 0030     PRN Meds:.  Current Facility-Administered Medications:     acetaminophen, 650 mg, Oral, Q4H PRN    aluminum-magnesium hydroxide-simethicone, 30 mL, Oral, QID PRN    HYDROcodone-acetaminophen, 1 tablet, Oral, Q6H PRN    magnesium oxide, 800 mg, Oral, PRN    magnesium oxide, 800 mg, Oral, PRN    melatonin, 6 mg, Oral, Nightly PRN    naloxone, 0.02 mg, Intravenous, PRN    ondansetron, 4 mg, Intravenous, Q6H PRN    potassium bicarbonate, 35 mEq, Oral, PRN    potassium bicarbonate, 50 mEq, Oral, PRN    potassium bicarbonate, 60 mEq, Oral, PRN    potassium, sodium phosphates, 2 packet, Oral, PRN    potassium, sodium phosphates, 2 packet, Oral, PRN    potassium, sodium phosphates, 2 packet, Oral, PRN  Medications Ordered Prior to Encounter[1]    Allergies:  Patient has no known allergies.    Past Family History:  Reviewed; refer to Hospitalist Admission Note    Review of Systems:  Review of Systems - All 14 systems reviewed and negative, except as noted in HPI    Physical Exam:  /70 (BP Location: Left arm, Patient Position: Lying)   Pulse 79   Temp 98 °F (36.7 °C) (Oral)   Resp 18   Ht 5' 3" (1.6 m)   Wt 85.5 kg (188 lb 7.9 oz)   SpO2 97%   BMI 33.39 kg/m²     General Appearance:    NAD, AAO x 3, cooperative, appears stated age   Head:    Normocephalic, atraumatic   Eyes:    PER, EOMI, and conjunctiva/sclera clear bilaterally        Mouth:   Moist mucus membranes, no thrush or oral lesions, normal      dentition   Back:     No CVA tenderness   Lungs:     Clear to auscultation bilaterally, no wheeze, crackles, rales      or rhonchi, symmetric air movement, respirations unlabored   Heart:    Regular rate and rhythm, S1 and S2 normal, no murmur, rub   or gallop   Abdomen:     Soft, non-tender, non-distended, bowel sounds active all four   quadrants, no RT or guarding, " no masses, no organomegaly   Extremities:   Warm and well perfused, distal pulses intact, no cyanosis or    peripheral edema   MSK:   No joint or muscle swelling, tenderness or deformity   Skin:   Skin color, texture, turgor normal, no rashes or lesions   Neurologic/Psychiatric:   CNII-XII intact, normal strength and sensation       throughout, no asterixis; normal affect, memory, judgement     and insight     Results:  Lab Results   Component Value Date     (L) 05/07/2025    K 4.1 05/07/2025    CL 99 05/07/2025    CO2 22 (L) 05/07/2025    BUN 52 (H) 05/07/2025    CREATININE 3.1 (H) 05/07/2025    CALCIUM 8.7 05/07/2025    ANIONGAP 12 05/07/2025    ESTGFRAFRICA 52 (L) 04/25/2022    EGFRNONAA 45 (L) 04/25/2022       Lab Results   Component Value Date    CALCIUM 8.7 05/07/2025    PHOS 3.9 01/28/2025       Recent Labs   Lab 05/07/25  0524   WBC 8.55   RBC 3.69*   HGB 9.8*   HCT 33.8*      MCV 92   MCH 26.6*   MCHC 29.0*       I have personally reviewed pertinent radiological imaging and reports from this admission.    I have spent > 70 minutes providing care for this patient for the above diagnoses. These services have included chart/data/imaging review, evaluation, exam, formulation of plan, , note preparation, and discussions with staff involved in this patient's care.    Brett Deng MD MPH   [facility]  [address] [place] [id number]  [phone] (p)  [phone] (f)         [1]   Current Facility-Administered Medications on File Prior to Encounter   Medication Dose Route Frequency Provider Last Rate Last Admin    lidocaine (PF) 10 mg/ml (1%) injection 5 mg  0.5 mL Intradermal Once Azeem Anderson MD         Current Outpatient Medications on File Prior to Encounter   Medication Sig Dispense Refill    furosemide (LASIX) 40 MG tablet Take 40 mg by mouth once daily.      albuterol-ipratropium (DUO-NEB) 2.5 mg-0.5 mg/3 mL nebulizer solution Take 3 mLs by  nebulization every 4 (four) hours as needed for Wheezing or Shortness of Breath. Rescue 75 mL 0    amiodarone (PACERONE) 200 MG Tab Take 0.5 tablets (100 mg total) by mouth once daily. 90 tablet 3    amLODIPine (NORVASC) 2.5 MG tablet Take 1 tablet (2.5 mg total) by mouth every evening. 90 tablet 3    apixaban (ELIQUIS) 5 mg Tab Take 1 tablet (5 mg total) by mouth 2 (two) times daily. 60 tablet 11    aspirin (ECOTRIN) 81 MG EC tablet Take 1 tablet (81 mg total) by mouth once daily. 30 tablet 0    atorvastatin (LIPITOR) 10 MG tablet Take 1 tablet (10 mg total) by mouth every evening. 90 tablet 3    budesonide-glycopyr-formoterol (BREZTRI AEROSPHERE) 160-9-4.8 mcg/actuation HFAA Inhale 2 puffs into the lungs 2 (two) times a day.      bumetanide (BUMEX) 1 MG tablet Take 1 tablet (1 mg total) by mouth once daily. (Patient taking differently: Take 1 mg by mouth 2 (two) times a day.) 90 tablet 4    buPROPion (WELLBUTRIN SR) 150 MG TBSR 12 hr tablet Take 1 tablet (150 mg total) by mouth 2 (two) times daily. 180 tablet 3    FLUoxetine 40 MG capsule Take 1 capsule (40 mg total) by mouth once daily. 90 capsule 3    gabapentin (NEURONTIN) 300 MG capsule Take 1 capsule (300 mg total) by mouth nightly as needed (pain). (Patient taking differently: Take 300 mg by mouth once daily.) 30 capsule 2    iron polysaccharides (NIFEREX) 150 mg iron Cap Take 1 capsule (150 mg total) by mouth once daily. 90 capsule 0    metoprolol succinate (TOPROL-XL) 50 MG 24 hr tablet Take 1 tablet (50 mg total) by mouth 2 (two) times daily. 180 tablet 3    mirtazapine (REMERON) 15 MG tablet Take 1 tablet (15 mg total) by mouth every evening. For sleep 30 tablet 3    mupirocin (BACTROBAN) 2 % ointment Apply topically 2 (two) times daily. To infected skin tear 22 g 1    potassium chloride SA (K-DUR,KLOR-CON M) 10 MEQ tablet Take 1 tablet (10 mEq total) by mouth once daily. 30 tablet 5    spironolactone (ALDACTONE) 25 MG tablet Take 1 tablet (25 mg total)  by mouth once daily. 90 tablet 3    triamcinolone acetonide 0.1% (KENALOG) 0.1 % cream Apply topically 2 (two) times daily. 60 g 2    warfarin (COUMADIN) 2.5 MG tablet Take 1-1.5 Tablets QPM as instructed by coumadin clinic 40 tablet 3

## 2025-05-07 NOTE — CARE UPDATE
05/07/25 0729   Patient Assessment/Suction   Level of Consciousness (AVPU) alert   Respiratory Effort Unlabored;Normal   Expansion/Accessory Muscles/Retractions no use of accessory muscles;no retractions;expansion symmetric   All Lung Fields Breath Sounds diminished   Rhythm/Pattern, Respiratory unlabored;pattern regular;depth regular;chest wiggle adequate;no shortness of breath reported   Cough Frequency infrequent   Cough Type good;nonproductive   PRE-TX-O2   Device (Oxygen Therapy) nasal cannula   $ Is the patient on Low Flow Oxygen? Yes   Flow (L/min) (Oxygen Therapy) 2   SpO2 97 %   Pulse Oximetry Type Intermittent   $ Pulse Oximetry - Multiple Charge Pulse Oximetry - Multiple   Pulse 79   Resp 19   Aerosol Therapy   $ Aerosol Therapy Charges Aerosol Treatment   Daily Review of Necessity (SVN) completed   Respiratory Treatment Status (SVN) given   Treatment Route (SVN) oxygen;mouthpiece utilized   Patient Position HOB elevated   Post Treatment Assessment (SVN) increased aeration   Signs of Intolerance (SVN) none   Breath Sounds Post-Respiratory Treatment   Throughout All Fields Post-Treatment All Fields   Throughout All Fields Post-Treatment aeration increased   Post-treatment Heart Rate (beats/min) 79   Post-treatment Resp Rate (breaths/min) 19   Education   $ Education Bronchodilator;15 min

## 2025-05-07 NOTE — SUBJECTIVE & OBJECTIVE
Past Medical History:   Diagnosis Date    Anemia, unspecified     Arthritis     Asthma     COPD (chronic obstructive pulmonary disease)     Encounter for blood transfusion     Hypertension     Obese body habitus     Wound infection 2019    Right knee       Past Surgical History:   Procedure Laterality Date    ANGIOGRAM, CORONARY, WITH LEFT HEART CATHETERIZATION N/A 2022    Procedure: Angiogram, Coronary, with Left Heart Cath;  Surgeon: Jorge Tran MD;  Location: [facility] CATH/EP LAB;  Service: Cardiology;  Laterality: N/A;     SECTION      ECHOCARDIOGRAM,TRANSESOPHAGEAL N/A 2023    Procedure: Transesophageal echo (MARIANO) intra-procedure log documentation;  Surgeon: Provider, Dos Diagnostic;  Location: [facility] EP LAB;  Service: Cardiology;  Laterality: N/A;    INCISION AND DRAINAGE OF HEMATOMA Left 2024    Procedure: INCISION AND DRAINAGE, HEMATOMA;  Surgeon: Bryson Huerta MD;  Location: [facility] OR;  Service: Orthopedics;  Laterality: Left;    JOINT REPLACEMENT      Knee    KNEE ARTHROPLASTY Right 2019    Procedure: ARTHROPLASTY, KNEE;  Surgeon: Delio Chung MD;  Location: [facility] OR;  Service: Orthopedics;  Laterality: Right;  anesthesia:  general and block    REPLACEMENT OF WOUND VACUUM-ASSISTED CLOSURE DEVICE Right 2019    Procedure: REPLACEMENT, WOUND VAC;  Surgeon: Delio Chung MD;  Location: [facility] OR;  Service: Orthopedics;  Laterality: Right;    TONSILLECTOMY      TREATMENT OF CARDIAC ARRHYTHMIA N/A 2023    Procedure: Cardioversion or Defibrillation;  Surgeon: PJ Betancourt MD;  Location: [facility] EP LAB;  Service: Cardiology;  Laterality: N/A;  AF, MARIANO, DCCV, MAC, EH, 3 Prep    VENTRICULOGRAM, LEFT  2022    Procedure: Ventriculogram, Left;  Surgeon: Jorge Tran MD;  Location: [facility] CATH/EP LAB;  Service: Cardiology;;       Review of patient's allergies indicates:  No Known Allergies    Current Facility-Administered Medications on File Prior to  Encounter   Medication    lidocaine (PF) 10 mg/ml (1%) injection 5 mg     Current Outpatient Medications on File Prior to Encounter   Medication Sig    furosemide (LASIX) 40 MG tablet Take 40 mg by mouth once daily.    albuterol-ipratropium (DUO-NEB) 2.5 mg-0.5 mg/3 mL nebulizer solution Take 3 mLs by nebulization every 4 (four) hours as needed for Wheezing or Shortness of Breath. Rescue    amiodarone (PACERONE) 200 MG Tab Take 0.5 tablets (100 mg total) by mouth once daily.    amLODIPine (NORVASC) 2.5 MG tablet Take 1 tablet (2.5 mg total) by mouth every evening.    apixaban (ELIQUIS) 5 mg Tab Take 1 tablet (5 mg total) by mouth 2 (two) times daily.    aspirin (ECOTRIN) 81 MG EC tablet Take 1 tablet (81 mg total) by mouth once daily.    atorvastatin (LIPITOR) 10 MG tablet Take 1 tablet (10 mg total) by mouth every evening.    budesonide-glycopyr-formoterol (BREZTRI AEROSPHERE) 160-9-4.8 mcg/actuation HFAA Inhale 2 puffs into the lungs 2 (two) times a day.    bumetanide (BUMEX) 1 MG tablet Take 1 tablet (1 mg total) by mouth once daily.    buPROPion (WELLBUTRIN SR) 150 MG TBSR 12 hr tablet Take 1 tablet (150 mg total) by mouth 2 (two) times daily.    FLUoxetine 40 MG capsule Take 1 capsule (40 mg total) by mouth once daily.    gabapentin (NEURONTIN) 300 MG capsule Take 1 capsule (300 mg total) by mouth nightly as needed (pain).    iron polysaccharides (NIFEREX) 150 mg iron Cap Take 1 capsule (150 mg total) by mouth once daily.    metoprolol succinate (TOPROL-XL) 50 MG 24 hr tablet Take 1 tablet (50 mg total) by mouth 2 (two) times daily.    mirtazapine (REMERON) 15 MG tablet Take 1 tablet (15 mg total) by mouth every evening. For sleep    mupirocin (BACTROBAN) 2 % ointment Apply topically 2 (two) times daily. To infected skin tear    potassium chloride SA (K-DUR,KLOR-CON M) 10 MEQ tablet Take 1 tablet (10 mEq total) by mouth once daily.    spironolactone (ALDACTONE) 25 MG tablet Take 1 tablet (25 mg total) by mouth  once daily.    triamcinolone acetonide 0.1% (KENALOG) 0.1 % cream Apply topically 2 (two) times daily.    warfarin (COUMADIN) 2.5 MG tablet Take 1-1.5 Tablets QPM as instructed by coumadin clinic     Family History       Problem Relation (Age of Onset)    Alzheimer's disease Mother          Tobacco Use    Smoking status: Former     Current packs/day: 0.00     Average packs/day: 0.5 packs/day for 50.0 years (23.7 ttl pk-yrs)     Types: Cigarettes     Start date:      Quit date: 2024     Years since quittin.3     Passive exposure: Current    Smokeless tobacco: Never    Tobacco comments:     .   Substance and Sexual Activity    Alcohol use: Yes     Comment: RARELY    Drug use: Never    Sexual activity: Not Currently     Partners: Male     Review of Systems   Constitutional:  Negative for activity change and appetite change.   HENT:  Negative for congestion and dental problem.    Eyes:  Negative for discharge and itching.   Respiratory:  Negative for shortness of breath.    Cardiovascular:  Negative for chest pain.   Gastrointestinal:  Negative for abdominal distention and abdominal pain.   Endocrine: Negative for cold intolerance.   Genitourinary:  Negative for difficulty urinating and dysuria.   Musculoskeletal:  Positive for arthralgias and back pain.   Skin:  Negative for color change.   Neurological:  Negative for dizziness and facial asymmetry.   Hematological:  Negative for adenopathy.   Psychiatric/Behavioral:  Negative for agitation and behavioral problems.      Objective:     Vital Signs (Most Recent):  Pulse: 95 (25 1651)  Resp: (!) 22 (25 1651)  BP: 106/72 (25 1651)  SpO2: 97 % (25 1651) Vital Signs (24h Range):  Pulse:  [95] 95  Resp:  [22] 22  SpO2:  [97 %] 97 %  BP: (106)/(72) 106/72     Weight: 90.7 kg (200 lb)  Body mass index is 35.43 kg/m².     Physical Exam  Vitals and nursing note reviewed.   Constitutional:       General: She is not in acute distress.  HENT:       Head: Atraumatic.      Right Ear: External ear normal.      Left Ear: External ear normal.      Nose: Nose normal.      Mouth/Throat:      Mouth: Mucous membranes are dry.   Eyes:      Extraocular Movements: Extraocular movements intact.   Cardiovascular:      Rate and Rhythm: Normal rate.   Pulmonary:      Effort: Pulmonary effort is normal.   Abdominal:      Palpations: Abdomen is soft.   Musculoskeletal:         General: Normal range of motion.      Cervical back: Normal range of motion.   Skin:     General: Skin is warm.   Neurological:      Mental Status: She is alert and oriented to person, place, and time.   Psychiatric:         Behavior: Behavior normal.                Significant Labs: All pertinent labs within the past 24 hours have been reviewed.  CBC:   Recent Labs   Lab 05/06/25 1912   WBC 10.82   HGB 10.4*   HCT 34.4*        CMP:   Recent Labs   Lab 05/06/25 1912   *   K 3.7   CL 95   CO2 24      BUN 50*   CREATININE 3.1*   CALCIUM 8.9   PROT 6.7   ALBUMIN 3.7   BILITOT 0.7   ALKPHOS 123   AST 29   ALT 39   ANIONGAP 12       Significant Imaging: I have reviewed all pertinent imaging results/findings within the past 24 hours.   Name band;

## 2025-05-07 NOTE — PROGRESS NOTES
Automatic Inhaler to Nebulizer Interchange    budesonide/glycopyrrolate/formoterol (Breztri) 640 mcg/ 36 mcg/ 19.2 mcg changed to budesonide 1 mg twice daily AND ipratropium 0.5 mg every 6 hour scheduled AND arformoterol 15 mcg twice daily per [facility] Automatic Therapeutic Substitutions Protocol.    Please contact pharmacy at extension [phone] with any questions.     Thank you,   Keeley Fenton

## 2025-05-07 NOTE — ASSESSMENT & PLAN NOTE
Patient's COPD is controlled currently.  Patient is currently off COPD Pathway. Continue scheduled inhalers Nebs, Steroids, and Supplemental oxygen and monitor respiratory status closely.

## 2025-05-07 NOTE — CARE UPDATE
05/07/25 0309   Patient Assessment/Suction   Level of Consciousness (AVPU) alert   Skin Integrity   $ Wound Care Tech Time 15 min   Area Observed Left;Right;Behind ear;Cheek;Nares   Skin Appearance without discoloration   PRE-TX-O2   Device (Oxygen Therapy) nasal cannula   $ Is the patient on Low Flow Oxygen? Yes   Flow (L/min) (Oxygen Therapy) 2   SpO2 97 %   Pulse Oximetry Type Intermittent   $ Pulse Oximetry - Multiple Charge Pulse Oximetry - Multiple   Pulse 75   Resp 19   Tobacco Cessation Intervention   Do you use any type of tobacco product? No   $ Smoking Cessation Charges Smoking Cessation - Intermediate (Non-CTTS)   Respiratory Evaluation   $ Care Plan Tech Time 15 min   $ Respiratory Evaluation Complete   Evaluation For New Orders   Admitting Diagnosis JEFF   Cardiac Diagnosis HTN   Pulmonary Diagnosis COPD, ASTHMA   Home Aerosol, MDI, DPI, and Other Treatments/Therapies   Home Respiratory Therapy Per Patient/Review of Chart Yes   Aerosol Home Meds/Freq DUO   MDI Home Meds/Freq BREZTRI   Oxygen Care Plan   Oxygen Care Plan Per Protocol   SPO2 Goal (%) 95% cardiac   Rationale SpO2 is <95% (Cardiac Pt.)   Bronchodilator Care Plan   Bronchodilator Care Plan Aerosol   Aerosol Meds w/ frequency Atrovent(Ipratropium Bromide) 500mcg Q 6Hr;Pulmicort(Budenoside) 0.5mg Q 12Hr   Rationale Maintain home respiratory medicine   Atelectasis Care Plan   Atelectasis Care Plan Other   Rationale No Rational Found   Airway Clearance Care Plan   Airway Clearance Care Plan Other   Rationale No rationale found

## 2025-05-07 NOTE — ASSESSMENT & PLAN NOTE
Creatine stable for now. BMP reviewed- noted Estimated Creatinine Clearance: 17.8 mL/min (A) (based on SCr of 3.1 mg/dL (H)). according to latest data. Based on current GFR, CKD stage is stage 4 - GFR 15-29.  Monitor UOP and serial BMP and adjust therapy as needed. Renally dose meds. Avoid nephrotoxic medications and procedures.

## 2025-05-07 NOTE — PLAN OF CARE
[facility]  Initial Discharge Assessment       Primary Care Provider: Elkin You MD    Admission Diagnosis: JEFF (acute kidney injury) [N17.9]  Acute thoracic back pain, unspecified back pain laterality [M54.6]    Admission Date: 5/6/2025  Expected Discharge Date:     SW met with patient bedside. Barbara, daughter, was present in the room assisting with assessment. SW verified demographics, insurance, supports, and PCP.  Patient reported she lives in the home with spouse and drives self to appointments. SW assessed patient's needs. Patient is able to complete ADLs independently with equipment. Patient verified at home DME: shower chair, rollator.  Patient verified HH ([facility]), No Dialysis, Blood Thinners (Coumadin), and No Oxygen. Patient reported her labs are monitored by the [facility]. Patient reported she has been on Coumadin for a few weeks due to the Eliquis being too expensive. SW provided the patient with an Eliquis coupon at bedside.     Patient verified pharmacy of choice: [facility]  Patient confirmed Barbara, daughter, will be source of transportation at the time of discharge.     Transition of Care Barriers: None    Payor: Fara MGD [facility] / Plan: Fara CHOICES / Product Type: Medicare Advantage /     Extended Emergency Contact Information  Primary Emergency Contact: Barbara Ribeiro  Work Phone: [phone]  Mobile Phone: [phone]  Relation: Daughter   needed? No  Secondary Emergency Contact: Shonna Escoto  Mobile Phone: [phone]  Relation: Relative  Preferred language: English   needed? No    Discharge Plan A: Home Health  Discharge Plan B: Home Health      [id number] - [place] - [address] AT [facility] [address]  [address]  [place] [address]  Phone: [phone] Fax: [phone]      Initial Assessment (most recent)       Adult Discharge Assessment -  05/07/25 1303          Discharge Assessment    Assessment Type Discharge Planning Assessment     Confirmed/corrected address, phone number and insurance Yes     Confirmed Demographics Correct on Facesheet     Source of Information patient;family     When was your last doctors appointment? --   Few weeks ago    Does patient/caregiver understand observation status Yes     Communicated VALERIANO with patient/caregiver Date not available/Unable to determine     Reason For Admission JEFF     People in Home spouse     Do you expect to return to your current living situation? Yes     Do you have help at home or someone to help you manage your care at home? No     Prior to hospitilization cognitive status: Unable to Assess     Current cognitive status: Alert/Oriented     Walking or Climbing Stairs Difficulty yes     Walking or Climbing Stairs ambulation difficulty, requires equipment     Mobility Management rollator     Dressing/Bathing Difficulty yes     Dressing/Bathing bathing difficulty, requires equipment     Dressing/Bathing Management shower chair     Home Accessibility wheelchair accessible     Home Layout Able to live on 1st floor     Equipment Currently Used at Home shower chair;rollator     Readmission within 30 days? No     Patient currently being followed by outpatient case management? No     Do you currently have service(s) that help you manage your care at home? Yes     [facility] Contact [facility] of [facility]     Is the pt/caregiver preference to resume services with current agency Yes     Do you take prescription medications? Yes     Do you have prescription coverage? Yes     Coverage [facility] Medicare     Do you have any problems affording any of your prescribed medications? Yes     If yes, what medications? Eliquis, Patient reported she is currently on Coumadin due to not being able to afford her Eliquis.     Is the patient taking medications as prescribed? yes     Who is going to help you get home  at discharge? Barbara Ribeiro, daughter     How do you get to doctors appointments? car, drives self     Are you on dialysis? No     Do you take coumadin? Yes     Who monitors your labs? [facility]     Discharge Plan A Home Health     Discharge Plan B Home Health     DME Needed Upon Discharge  none     Discharge Plan discussed with: Patient     Transition of Care Barriers None

## 2025-05-07 NOTE — PLAN OF CARE
Problem: Occupational Therapy  Goal: Occupational Therapy Goal  Description: Goals to be met by: 6/7/2025     Patient will increase functional independence with ADLs by performing:    UE Dressing with Modified [place].  LE Dressing with Modified [place].  Grooming while standing at sink with Modified [place].  Toileting from toilet with Modified [place] for hygiene and clothing management.   Supine to sit with Modified [place].  Step transfer with Modified [place]  Toilet transfer to toilet with Modified [place].    Outcome: Progressing

## 2025-05-07 NOTE — HPI
69 year old pt getting admitted with recurrent falls /JEFF  Pt also c/o back pain but she said its been there since she had back surgery long time back  Because of the falls she came to ER and found to have JEFF and got admitted  She was sleepy and was little confused upon presentation  Her condition improved with iv fluids and she was no longer confused when saw in ER  Pt denied any other issues or c/o

## 2025-05-07 NOTE — ASSESSMENT & PLAN NOTE
Maintain iv fluids  Stop all nephrotoxins    Latest Reference Range & Units 01/27/25 06:21 01/28/25 06:48 04/22/25 01:17 05/06/25 19:12   Creatinine 0.5 - 1.4 mg/dL 1.6 (H) 1.6 (H) 2.2 (H) 3.1 (H)   (H): Data is abnormally high

## 2025-05-07 NOTE — ASSESSMENT & PLAN NOTE
Stable  Hold Torsemide and aldactone at the moment in view with JEFF  Doesn't look hypervolemic at the moment

## 2025-05-07 NOTE — ASSESSMENT & PLAN NOTE
Body mass index is 35.43 kg/m². Morbid obesity complicates all aspects of disease management from diagnostic modalities to treatment.

## 2025-05-07 NOTE — ASSESSMENT & PLAN NOTE
Creatine stable for now. BMP reviewed- noted Estimated Creatinine Clearance: 17.2 mL/min (A) (based on SCr of 3.1 mg/dL (H)). according to latest data. Based on current GFR, CKD stage is stage 4 - GFR 15-29.  Monitor UOP and serial BMP and adjust therapy as needed. Renally dose meds. Avoid nephrotoxic medications and procedures.

## 2025-05-07 NOTE — PLAN OF CARE
GAONA signed and scanned into .      05/07/25 1310   GAONA Message   Medicare Outpatient and Observation Notification regarding financial responsibility Explained to patient/caregiver;Signed/date by patient/caregiver   Date GAONA was signed 05/07/25   Time GAONA was signed [id number]

## 2025-05-07 NOTE — ASSESSMENT & PLAN NOTE
Body mass index is 33.39 kg/m². Morbid obesity complicates all aspects of disease management from diagnostic modalities to treatment.

## 2025-05-07 NOTE — PT/OT/SLP EVAL
"Physical Therapy Evaluation    Patient Name:  Iris Mueller   MRN:  [id number]    Recommendations:     Discharge Recommendations: Low Intensity Therapy   Discharge Equipment Recommendations: none   Barriers to discharge: None    Assessment:     Iris Muellre is a 69 y.o. female admitted with a medical diagnosis of JEFF (acute kidney injury).  She presents with the following impairments/functional limitations: weakness, impaired endurance, gait instability, impaired balance, impaired functional mobility, decreased safety awareness, pain, impaired cardiopulmonary response to activity .    Rehab Prognosis: Good; patient would benefit from acute skilled PT services to address these deficits and reach maximum level of function.    Recent Surgery: * No surgery found *      Plan:     During this hospitalization, patient to be seen 6 x/week to address the identified rehab impairments via gait training, therapeutic activities, therapeutic exercises and progress toward the following goals:    Plan of Care Expires:   6/7/2025    Subjective     Chief Complaint: 6/7/2025  Patient/Family Comments/goals: Return home with spouse  Pain/Comfort:  Pain Rating 1: Did not rate pain to L posterior thigh    Patients cultural, spiritual, Jain conflicts given the current situation:      Living Environment:  Pt lives with her  in a [facility] w/o entry steps. Pt is caregiver of  who is "nearly blind" and [place].   Prior to admission, patients level of function was Mod I . (+)   Equipment used at home: shower chair, rollator.  DME owned (not currently used): wheelchair.  Upon discharge, patient will have assistance from daughter .    Objective:     Communicated with nurse  prior to session.  Patient found supine with bed alarm, telemetry, oxygen  upon PT entry to room.    General Precautions: Standard, fall  Orthopedic Precautions:    Braces:    Respiratory Status: Nasal cannula, flow 2 " "L/min    Exams:  Cognitive Exam:  Patient is oriented to Person, Place, Time, and Situation  RLE ROM: WFL  RLE Strength: WFL  LLE ROM: WFL   LLE Strength: 3+/5 with  report of posterior thigh pain with difficulty advancing it due to sciatic nerve pain    Functional Mobility:  Bed Mobility:     Supine to Sit: stand by assistance  Sit to Supine: moderate assistance  Transfers:     Sit to Stand:  contact guard assistance with rolling walker  Gait: 30 ft RW and CGA cueing for safety.Did not ambulate farther due to "bad knees."      AM-PAC 6 CLICK MOBILITY  Total Score:        Treatment & Education:  Pt was educated on safety, use of call light, PT DC recommendation    Patient left supine with all lines intact, call button in reach, and bed alarm on.    GOALS:   Multidisciplinary Problems       Physical Therapy Goals          Problem: Physical Therapy    Goal Priority Disciplines Outcome Interventions   Physical Therapy Goal     PT, PT/OT     Description: Goals to be met by: 2025     Patient will increase functional independence with mobility by performin. Supine to sit with Modified [place]  2. Sit to stand transfer with Modified [place]  3. Gait  x 150  feet with Modified [place] using Rolling Walker.                          DME Justifications:  No DME recommended requiring DME justifications    History:     Past Medical History:   Diagnosis Date    Anemia, unspecified     Arthritis     Asthma     COPD (chronic obstructive pulmonary disease)     Encounter for blood transfusion     Hypertension     Obese body habitus     Wound infection 2019    Right knee       Past Surgical History:   Procedure Laterality Date    ANGIOGRAM, CORONARY, WITH LEFT HEART CATHETERIZATION N/A 2022    Procedure: Angiogram, Coronary, with Left Heart Cath;  Surgeon: Jorge Tran MD;  Location: [facility] CATH/EP LAB;  Service: Cardiology;  Laterality: N/A;     SECTION      ECHOCARDIOGRAM,TRANSESOPHAGEAL " N/A 8/31/2023    Procedure: Transesophageal echo (MARIANO) intra-procedure log documentation;  Surgeon: Provider, Araceli Diagnostic;  Location: [facility] EP LAB;  Service: Cardiology;  Laterality: N/A;    INCISION AND DRAINAGE OF HEMATOMA Left 2/5/2024    Procedure: INCISION AND DRAINAGE, HEMATOMA;  Surgeon: Bryson Huerta MD;  Location: [facility] OR;  Service: Orthopedics;  Laterality: Left;    JOINT REPLACEMENT      Knee    KNEE ARTHROPLASTY Right 8/14/2019    Procedure: ARTHROPLASTY, KNEE;  Surgeon: Delio Chung MD;  Location: [facility] OR;  Service: Orthopedics;  Laterality: Right;  anesthesia:  general and block    REPLACEMENT OF WOUND VACUUM-ASSISTED CLOSURE DEVICE Right 9/12/2019    Procedure: REPLACEMENT, WOUND VAC;  Surgeon: Delio Chung MD;  Location: [facility] OR;  Service: Orthopedics;  Laterality: Right;    TONSILLECTOMY      TREATMENT OF CARDIAC ARRHYTHMIA N/A 8/31/2023    Procedure: Cardioversion or Defibrillation;  Surgeon: PJ Betancourt MD;  Location: [facility] EP LAB;  Service: Cardiology;  Laterality: N/A;  AF, MARIANO, DCCV, MAC, EH, 3 Prep    VENTRICULOGRAM, LEFT  12/23/2022    Procedure: Ventriculogram, Left;  Surgeon: Jorge Tran MD;  Location: [facility] CATH/EP LAB;  Service: Cardiology;;       Time Tracking:     PT Received On: 05/07/25  PT Start Time: 1425     PT Stop Time: 1440  PT Total Time (min): 15 min     Billable Minutes: Evaluation 7 minutes  and Gait Training 8 minutes      05/07/2025

## 2025-05-07 NOTE — SUBJECTIVE & OBJECTIVE
Interval History:  Patient evaluated at bedside.  She is drowsy but answers questions appropriately.  She reports that she just got some pain medication.  Denies any complaints at present.  Creatinine 3.1 this a.m..  Nephrology was consulted.    Review of Systems   Constitutional:  Positive for activity change. Negative for appetite change, chills, diaphoresis and fever.   Respiratory:  Negative for shortness of breath.    Cardiovascular:  Negative for chest pain.   Gastrointestinal:  Negative for abdominal pain, anal bleeding, diarrhea, nausea and vomiting.     Objective:     Vital Signs (Most Recent):  Temp: 97.9 °F (36.6 °C) (05/07/25 1227)  Pulse: 79 (05/07/25 1317)  Resp: 19 (05/07/25 1317)  BP: 102/69 (05/07/25 1227)  SpO2: 97 % (05/07/25 1317) Vital Signs (24h Range):  Temp:  [97.9 °F (36.6 °C)-98.6 °F (37 °C)] 97.9 °F (36.6 °C)  Pulse:  [70-95] 79  Resp:  [18-22] 19  SpO2:  [95 %-98 %] 97 %  BP: ()/(58-76) 102/69     Weight: 85.5 kg (188 lb 7.9 oz)  Body mass index is 33.39 kg/m².    Intake/Output Summary (Last 24 hours) at 5/7/2025 1327  Last data filed at 5/7/2025 1241  Gross per 24 hour   Intake 480 ml   Output --   Net 480 ml         Physical Exam  Constitutional:       Appearance: Normal appearance.   HENT:      Head: Normocephalic.      Mouth/Throat:      Mouth: Mucous membranes are moist.      Pharynx: Oropharynx is clear.   Eyes:      Extraocular Movements: Extraocular movements intact.   Cardiovascular:      Rate and Rhythm: Normal rate and regular rhythm.      Pulses: Normal pulses.      Heart sounds: Normal heart sounds.   Pulmonary:      Effort: Pulmonary effort is normal.      Breath sounds: Normal breath sounds.   Abdominal:      General: Bowel sounds are normal. There is no distension.      Palpations: Abdomen is soft.      Tenderness: There is no abdominal tenderness. There is no guarding.   Skin:     Capillary Refill: Capillary refill takes less than 2 seconds.   Neurological:       Mental Status: She is alert and oriented to person, place, and time.   Psychiatric:         Mood and Affect: Mood normal.               Significant Labs: All pertinent labs within the past 24 hours have been reviewed.    Significant Imaging: I have reviewed all pertinent imaging results/findings within the past 24 hours.

## 2025-05-07 NOTE — H&P
[facility] - Emergency Dept  Hospital Medicine  History & Physical    Patient Name: Iris Mueller  MRN: [id number]  Patient Class: OP- Observation  Admission Date: 2025  Attending Physician: García Ibrahim MD   Primary Care Provider: Elkin You MD         Patient information was obtained from patient, past medical records, ER records, and ER MD .     Subjective:     Principal Problem:JEFF (acute kidney injury)    Chief Complaint:   Chief Complaint   Patient presents with    Fatigue    Fall     3 times today, hematoma above right eye    Weakness        HPI: 69 year old pt getting admitted with recurrent falls /JEFF  Pt also c/o back pain but she said its been there since she had back surgery long time back  Because of the falls she came to ER and found to have JEFF and got admitted  She was sleepy and was little confused upon presentation  Her condition improved with iv fluids and she was no longer confused when saw in ER  Pt denied any other issues or c/o    Past Medical History:   Diagnosis Date    Anemia, unspecified     Arthritis     Asthma     COPD (chronic obstructive pulmonary disease)     Encounter for blood transfusion     Hypertension     Obese body habitus     Wound infection 2019    Right knee       Past Surgical History:   Procedure Laterality Date    ANGIOGRAM, CORONARY, WITH LEFT HEART CATHETERIZATION N/A 2022    Procedure: Angiogram, Coronary, with Left Heart Cath;  Surgeon: Jorge Tran MD;  Location: [facility] CATH/EP LAB;  Service: Cardiology;  Laterality: N/A;     SECTION      ECHOCARDIOGRAM,TRANSESOPHAGEAL N/A 2023    Procedure: Transesophageal echo (MARIANO) intra-procedure log documentation;  Surgeon: Araceli Sue Diagnostic;  Location: [facility] EP LAB;  Service: Cardiology;  Laterality: N/A;    INCISION AND DRAINAGE OF HEMATOMA Left 2024    Procedure: INCISION AND DRAINAGE, HEMATOMA;  Surgeon: Bryson Huerta MD;  Location: [facility] OR;   Service: Orthopedics;  Laterality: Left;    JOINT REPLACEMENT      Knee    KNEE ARTHROPLASTY Right 8/14/2019    Procedure: ARTHROPLASTY, KNEE;  Surgeon: Delio Chung MD;  Location: [facility] OR;  Service: Orthopedics;  Laterality: Right;  anesthesia:  general and block    REPLACEMENT OF WOUND VACUUM-ASSISTED CLOSURE DEVICE Right 9/12/2019    Procedure: REPLACEMENT, WOUND VAC;  Surgeon: Delio Chung MD;  Location: [facility] OR;  Service: Orthopedics;  Laterality: Right;    TONSILLECTOMY      TREATMENT OF CARDIAC ARRHYTHMIA N/A 8/31/2023    Procedure: Cardioversion or Defibrillation;  Surgeon: PJ Betancourt MD;  Location: [facility] EP LAB;  Service: Cardiology;  Laterality: N/A;  AF, MARIANO, DCCV, MAC, EH, 3 Prep    VENTRICULOGRAM, LEFT  12/23/2022    Procedure: Ventriculogram, Left;  Surgeon: Jorge Tran MD;  Location: [facility] CATH/EP LAB;  Service: Cardiology;;       Review of patient's allergies indicates:  No Known Allergies    Current Facility-Administered Medications on File Prior to Encounter   Medication    lidocaine (PF) 10 mg/ml (1%) injection 5 mg     Current Outpatient Medications on File Prior to Encounter   Medication Sig    furosemide (LASIX) 40 MG tablet Take 40 mg by mouth once daily.    albuterol-ipratropium (DUO-NEB) 2.5 mg-0.5 mg/3 mL nebulizer solution Take 3 mLs by nebulization every 4 (four) hours as needed for Wheezing or Shortness of Breath. Rescue    amiodarone (PACERONE) 200 MG Tab Take 0.5 tablets (100 mg total) by mouth once daily.    amLODIPine (NORVASC) 2.5 MG tablet Take 1 tablet (2.5 mg total) by mouth every evening.    apixaban (ELIQUIS) 5 mg Tab Take 1 tablet (5 mg total) by mouth 2 (two) times daily.    aspirin (ECOTRIN) 81 MG EC tablet Take 1 tablet (81 mg total) by mouth once daily.    atorvastatin (LIPITOR) 10 MG tablet Take 1 tablet (10 mg total) by mouth every evening.    budesonide-glycopyr-formoterol (BREZTRI AEROSPHERE) 160-9-4.8 mcg/actuation HFAA Inhale 2 puffs into the  lungs 2 (two) times a day.    bumetanide (BUMEX) 1 MG tablet Take 1 tablet (1 mg total) by mouth once daily.    buPROPion (WELLBUTRIN SR) 150 MG TBSR 12 hr tablet Take 1 tablet (150 mg total) by mouth 2 (two) times daily.    FLUoxetine 40 MG capsule Take 1 capsule (40 mg total) by mouth once daily.    gabapentin (NEURONTIN) 300 MG capsule Take 1 capsule (300 mg total) by mouth nightly as needed (pain).    iron polysaccharides (NIFEREX) 150 mg iron Cap Take 1 capsule (150 mg total) by mouth once daily.    metoprolol succinate (TOPROL-XL) 50 MG 24 hr tablet Take 1 tablet (50 mg total) by mouth 2 (two) times daily.    mirtazapine (REMERON) 15 MG tablet Take 1 tablet (15 mg total) by mouth every evening. For sleep    mupirocin (BACTROBAN) 2 % ointment Apply topically 2 (two) times daily. To infected skin tear    potassium chloride SA (K-DUR,KLOR-CON M) 10 MEQ tablet Take 1 tablet (10 mEq total) by mouth once daily.    spironolactone (ALDACTONE) 25 MG tablet Take 1 tablet (25 mg total) by mouth once daily.    triamcinolone acetonide 0.1% (KENALOG) 0.1 % cream Apply topically 2 (two) times daily.    warfarin (COUMADIN) 2.5 MG tablet Take 1-1.5 Tablets QPM as instructed by coumadin clinic     Family History       Problem Relation (Age of Onset)    Alzheimer's disease Mother          Tobacco Use    Smoking status: Former     Current packs/day: 0.00     Average packs/day: 0.5 packs/day for 50.0 years (23.7 ttl pk-yrs)     Types: Cigarettes     Start date:      Quit date: 2024     Years since quittin.3     Passive exposure: Current    Smokeless tobacco: Never    Tobacco comments:     .   Substance and Sexual Activity    Alcohol use: Yes     Comment: RARELY    Drug use: Never    Sexual activity: Not Currently     Partners: Male     Review of Systems   Constitutional:  Negative for activity change and appetite change.   HENT:  Negative for congestion and dental problem.    Eyes:  Negative for discharge and  itching.   Respiratory:  Negative for shortness of breath.    Cardiovascular:  Negative for chest pain.   Gastrointestinal:  Negative for abdominal distention and abdominal pain.   Endocrine: Negative for cold intolerance.   Genitourinary:  Negative for difficulty urinating and dysuria.   Musculoskeletal:  Positive for arthralgias and back pain.   Skin:  Negative for color change.   Neurological:  Negative for dizziness and facial asymmetry.   Hematological:  Negative for adenopathy.   Psychiatric/Behavioral:  Negative for agitation and behavioral problems.      Objective:     Vital Signs (Most Recent):  Pulse: 95 (05/06/25 1651)  Resp: (!) 22 (05/06/25 1651)  BP: 106/72 (05/06/25 1651)  SpO2: 97 % (05/06/25 1651) Vital Signs (24h Range):  Pulse:  [95] 95  Resp:  [22] 22  SpO2:  [97 %] 97 %  BP: (106)/(72) 106/72     Weight: 90.7 kg (200 lb)  Body mass index is 35.43 kg/m².     Physical Exam  Vitals and nursing note reviewed.   Constitutional:       General: She is not in acute distress.  HENT:      Head: Atraumatic.      Right Ear: External ear normal.      Left Ear: External ear normal.      Nose: Nose normal.      Mouth/Throat:      Mouth: Mucous membranes are dry.   Eyes:      Extraocular Movements: Extraocular movements intact.   Cardiovascular:      Rate and Rhythm: Normal rate.   Pulmonary:      Effort: Pulmonary effort is normal.   Abdominal:      Palpations: Abdomen is soft.   Musculoskeletal:         General: Normal range of motion.      Cervical back: Normal range of motion.   Skin:     General: Skin is warm.   Neurological:      Mental Status: She is alert and oriented to person, place, and time.   Psychiatric:         Behavior: Behavior normal.                Significant Labs: All pertinent labs within the past 24 hours have been reviewed.  CBC:   Recent Labs   Lab 05/06/25 1912   WBC 10.82   HGB 10.4*   HCT 34.4*        CMP:   Recent Labs   Lab 05/06/25 1912   *   K 3.7   CL 95   CO2 24       BUN 50*   CREATININE 3.1*   CALCIUM 8.9   PROT 6.7   ALBUMIN 3.7   BILITOT 0.7   ALKPHOS 123   AST 29   ALT 39   ANIONGAP 12       Significant Imaging: I have reviewed all pertinent imaging results/findings within the past 24 hours.  Assessment/Plan:     Assessment & Plan  JEFF (acute kidney injury)  Maintain iv fluids  Stop all nephrotoxins    Latest Reference Range & Units 01/27/25 06:21 01/28/25 06:48 04/22/25 01:17 05/06/25 19:12   Creatinine 0.5 - 1.4 mg/dL 1.6 (H) 1.6 (H) 2.2 (H) 3.1 (H)   (H): Data is abnormally high  Recurrent falls  PT/OT  CT brain/CT Cervical spine WNL    CKD (chronic kidney disease) stage 4, GFR 15-29 ml/min  Creatine stable for now. BMP reviewed- noted Estimated Creatinine Clearance: 17.8 mL/min (A) (based on SCr of 3.1 mg/dL (H)). according to latest data. Based on current GFR, CKD stage is stage 4 - GFR 15-29.  Monitor UOP and serial BMP and adjust therapy as needed. Renally dose meds. Avoid nephrotoxic medications and procedures.  Chronic obstructive pulmonary disease  Patient's COPD is controlled currently.  Patient is currently off COPD Pathway. Continue scheduled inhalers Nebs, Steroids, and Supplemental oxygen and monitor respiratory status closely.   Chronic combined systolic and diastolic heart failure  Stable  Hold Torsemide and aldactone at the moment in view with JEFF  Doesn't look hypervolemic at the moment     Coronary artery disease involving native coronary artery of native heart without angina pectoris  Stable   Obesity  Body mass index is 35.43 kg/m². Morbid obesity complicates all aspects of disease management from diagnostic modalities to treatment.  Atrial fibrillation  [id number] Score: 3. The patients heart rate in the last 24 hours is as follows:  Pulse  Min: 95  Max: 95     Antiarrhythmics  amiodarone tablet 100 mg, Daily, Oral  metoprolol succinate (TOPROL-XL) 24 hr tablet 50 mg, Daily, Oral    Anticoagulants  apixaban tablet 5 mg, 2 times daily,  Oral      VTE Risk Mitigation (From admission, onward)           Ordered     apixaban tablet 5 mg  2 times daily         05/06/25 2151     IP VTE HIGH RISK PATIENT  Once         05/06/25 2151     Place sequential compression device  Until discontinued         05/06/25 2151                       On 05/06/2025, patient should be placed in hospital observation services under my care.             García Ibrahim MD  Department of Hospital Medicine  [facility] - Emergency Dept

## 2025-05-07 NOTE — ASSESSMENT & PLAN NOTE
Patient's blood pressure range in the last 24 hours was: BP  Min: 87/66  Max: 114/74.The patient's inpatient anti-hypertensive regimen is listed below:  Current Antihypertensives  , Daily, Oral    Plan  - BP is controlled, no changes needed to their regimen  - holding antihypertensives at present given JEFF with hypotension.  Adjust as needed.

## 2025-05-08 LAB
ABSOLUTE EOSINOPHIL (SMH): 0.19 K/UL
ABSOLUTE MONOCYTE (SMH): 0.59 K/UL (ref 0.3–1)
ABSOLUTE NEUTROPHIL COUNT (SMH): 6.6 K/UL (ref 1.8–7.7)
ALBUMIN SERPL-MCNC: 3.5 G/DL (ref 3.5–5.2)
ALP SERPL-CCNC: 161 UNIT/L (ref 55–135)
ALT SERPL-CCNC: 35 UNIT/L (ref 10–44)
ANION GAP (SMH): 8 MMOL/L (ref 8–16)
AST SERPL-CCNC: 23 UNIT/L (ref 10–40)
BASOPHILS # BLD AUTO: 0.02 K/UL
BASOPHILS NFR BLD AUTO: 0.2 %
BILIRUB SERPL-MCNC: 0.4 MG/DL (ref 0.1–1)
BUN SERPL-MCNC: 37 MG/DL (ref 8–23)
CALCIUM SERPL-MCNC: 8.7 MG/DL (ref 8.7–10.5)
CHLORIDE SERPL-SCNC: 101 MMOL/L (ref 95–110)
CO2 SERPL-SCNC: 25 MMOL/L (ref 23–29)
CREAT SERPL-MCNC: 2.3 MG/DL (ref 0.5–1.4)
ERYTHROCYTE [DISTWIDTH] IN BLOOD BY AUTOMATED COUNT: 17.1 % (ref 11.5–14.5)
GFR SERPLBLD CREATININE-BSD FMLA CKD-EPI: 22 ML/MIN/1.73/M2
GLUCOSE SERPL-MCNC: 94 MG/DL (ref 70–110)
HCT VFR BLD AUTO: 33.4 % (ref 37–48.5)
HGB BLD-MCNC: 9.8 GM/DL (ref 12–16)
IMM GRANULOCYTES # BLD AUTO: 0.04 K/UL (ref 0–0.04)
IMM GRANULOCYTES NFR BLD AUTO: 0.5 % (ref 0–0.5)
LYMPHOCYTES # BLD AUTO: 0.77 K/UL (ref 1–4.8)
MAGNESIUM SERPL-MCNC: 2 MG/DL (ref 1.6–2.6)
MCH RBC QN AUTO: 26.4 PG (ref 27–31)
MCHC RBC AUTO-ENTMCNC: 29.3 G/DL (ref 32–36)
MCV RBC AUTO: 90 FL (ref 82–98)
NUCLEATED RBC (/100WBC) (SMH): 0 /100 WBC
OHS QRS DURATION: 96 MS
OHS QTC CALCULATION: 487 MS
PLATELET # BLD AUTO: 230 K/UL (ref 150–450)
PMV BLD AUTO: 9.2 FL (ref 9.2–12.9)
POTASSIUM SERPL-SCNC: 4 MMOL/L (ref 3.5–5.1)
PROT SERPL-MCNC: 6.5 GM/DL (ref 6–8.4)
RBC # BLD AUTO: 3.71 M/UL (ref 4–5.4)
RELATIVE EOSINOPHIL (SMH): 2.3 % (ref 0–8)
RELATIVE LYMPHOCYTE (SMH): 9.4 % (ref 18–48)
RELATIVE MONOCYTE (SMH): 7.2 % (ref 4–15)
RELATIVE NEUTROPHIL (SMH): 80.4 % (ref 38–73)
SODIUM SERPL-SCNC: 134 MMOL/L (ref 136–145)
WBC # BLD AUTO: 8.16 K/UL (ref 3.9–12.7)

## 2025-05-08 PROCEDURE — 94761 N-INVAS EAR/PLS OXIMETRY MLT: CPT

## 2025-05-08 PROCEDURE — 80053 COMPREHEN METABOLIC PANEL: CPT | Performed by: INTERNAL MEDICINE

## 2025-05-08 PROCEDURE — 97535 SELF CARE MNGMENT TRAINING: CPT | Mod: CO

## 2025-05-08 PROCEDURE — 94640 AIRWAY INHALATION TREATMENT: CPT

## 2025-05-08 PROCEDURE — 12000002 HC ACUTE/MED SURGE SEMI-PRIVATE ROOM

## 2025-05-08 PROCEDURE — 83735 ASSAY OF MAGNESIUM: CPT | Performed by: INTERNAL MEDICINE

## 2025-05-08 PROCEDURE — 99900035 HC TECH TIME PER 15 MIN (STAT)

## 2025-05-08 PROCEDURE — 97116 GAIT TRAINING THERAPY: CPT | Mod: CQ

## 2025-05-08 PROCEDURE — 27000221 HC OXYGEN, UP TO 24 HOURS

## 2025-05-08 PROCEDURE — 97530 THERAPEUTIC ACTIVITIES: CPT | Mod: CQ

## 2025-05-08 PROCEDURE — 25000003 PHARM REV CODE 250: Performed by: INTERNAL MEDICINE

## 2025-05-08 PROCEDURE — 99900031 HC PATIENT EDUCATION (STAT)

## 2025-05-08 PROCEDURE — 36415 COLL VENOUS BLD VENIPUNCTURE: CPT | Performed by: INTERNAL MEDICINE

## 2025-05-08 PROCEDURE — 85025 COMPLETE CBC W/AUTO DIFF WBC: CPT | Performed by: INTERNAL MEDICINE

## 2025-05-08 PROCEDURE — 25000242 PHARM REV CODE 250 ALT 637 W/ HCPCS: Performed by: INTERNAL MEDICINE

## 2025-05-08 PROCEDURE — 11000001 HC ACUTE MED/SURG PRIVATE ROOM

## 2025-05-08 RX ADMIN — HYDROCODONE BITARTRATE AND ACETAMINOPHEN 1 TABLET: 5; 325 TABLET ORAL at 05:05

## 2025-05-08 RX ADMIN — BUDESONIDE INHALATION 1 MG: 0.5 SUSPENSION RESPIRATORY (INHALATION) at 07:05

## 2025-05-08 RX ADMIN — APIXABAN 5 MG: 5 TABLET, FILM COATED ORAL at 09:05

## 2025-05-08 RX ADMIN — HYDROCODONE BITARTRATE AND ACETAMINOPHEN 1 TABLET: 5; 325 TABLET ORAL at 08:05

## 2025-05-08 RX ADMIN — SODIUM CHLORIDE: 9 INJECTION, SOLUTION INTRAVENOUS at 05:05

## 2025-05-08 RX ADMIN — AMIODARONE HYDROCHLORIDE 100 MG: 100 TABLET ORAL at 09:05

## 2025-05-08 RX ADMIN — HYDROCODONE BITARTRATE AND ACETAMINOPHEN 1 TABLET: 5; 325 TABLET ORAL at 12:05

## 2025-05-08 RX ADMIN — IPRATROPIUM BROMIDE 0.5 MG: 0.5 SOLUTION RESPIRATORY (INHALATION) at 07:05

## 2025-05-08 RX ADMIN — ARFORMOTEROL TARTRATE 15 MCG: 15 SOLUTION RESPIRATORY (INHALATION) at 07:05

## 2025-05-08 RX ADMIN — PANTOPRAZOLE SODIUM 40 MG: 40 TABLET, DELAYED RELEASE ORAL at 05:05

## 2025-05-08 RX ADMIN — IPRATROPIUM BROMIDE 0.5 MG: 0.5 SOLUTION RESPIRATORY (INHALATION) at 01:05

## 2025-05-08 RX ADMIN — APIXABAN 5 MG: 5 TABLET, FILM COATED ORAL at 08:05

## 2025-05-08 RX ADMIN — IPRATROPIUM BROMIDE 0.5 MG: 0.5 SOLUTION RESPIRATORY (INHALATION) at 12:05

## 2025-05-08 NOTE — ASSESSMENT & PLAN NOTE
Patient's blood pressure range in the last 24 hours was: BP  Min: 102/68  Max: 136/76.The patient's inpatient anti-hypertensive regimen is listed below:  Current Antihypertensives  , Daily, Oral    Plan  - BP is controlled, no changes needed to their regimen  - holding antihypertensives at present given JEFF with hypotension.  Adjust as needed.

## 2025-05-08 NOTE — ASSESSMENT & PLAN NOTE
Creatine stable for now. BMP reviewed- noted Estimated Creatinine Clearance: 23.2 mL/min (A) (based on SCr of 2.3 mg/dL (H)). according to latest data. Based on current GFR, CKD stage is stage 4 - GFR 15-29.  Monitor UOP and serial BMP and adjust therapy as needed. Renally dose meds. Avoid nephrotoxic medications and procedures.

## 2025-05-08 NOTE — CARE UPDATE
05/07/25 1914   Patient Assessment/Suction   Level of Consciousness (AVPU) alert   Respiratory Effort Normal;Unlabored   Expansion/Accessory Muscles/Retractions no use of accessory muscles   All Lung Fields Breath Sounds equal bilaterally;diminished;clear   Rhythm/Pattern, Respiratory unlabored   Cough Frequency infrequent   Cough Type good;nonproductive   Skin Integrity   $ Wound Care Tech Time 15 min   Area Observed Left;Right;Behind ear   Skin Appearance without discoloration   PRE-TX-O2   Device (Oxygen Therapy) nasal cannula   Flow (L/min) (Oxygen Therapy) 2   SpO2 98 %   Pulse Oximetry Type Intermittent   $ Pulse Oximetry - Multiple Charge Pulse Oximetry - Multiple   Pulse 85   Resp 18   Aerosol Therapy   $ Aerosol Therapy Charges Aerosol Treatment  (Atrovent)   Daily Review of Necessity (SVN) completed   Respiratory Treatment Status (SVN) given   Treatment Route (SVN) mask   Patient Position HOB elevated   Post Treatment Assessment (SVN) breath sounds unchanged   Signs of Intolerance (SVN) none   Breath Sounds Post-Respiratory Treatment   Throughout All Fields Post-Treatment All Fields   Throughout All Fields Post-Treatment no change   Post-treatment Heart Rate (beats/min) 83   Post-treatment Resp Rate (breaths/min) 18   Education   $ Education Bronchodilator;Oxygen;15 min   Tobacco Cessation Intervention   Do you use any type of tobacco product? No   Respiratory Evaluation   $ Care Plan Tech Time 15 min   $ Respiratory Re-evaluation Complete   Evaluation For Transfer   Admitting Diagnosis Nick   Cardiac Diagnosis HTN   Pulmonary Diagnosis COPD, Asthma   Current Surgeries na   Home Oxygen   Has Home Oxygen? No   Home Aerosol, MDI, DPI, and Other Treatments/Therapies   Home Respiratory Therapy Per Patient/Review of Chart Yes   Aerosol Home Meds/Freq Duoneb   MDI Home Meds/Freq Breztri   Oxygen Care Plan   Oxygen Care Plan Per Protocol   SPO2 Goal (%) 95% cardiac   Rationale SpO2 is <95% (Cardiac Pt.)    Bronchodilator Care Plan   Bronchodilator Care Plan Aerosol   Aerosol Meds w/ frequency Atrovent(Ipratropium Bromide) 500mcg Q 6Hr;Pulmicort(Budenoside) 0.5mg BID   Rationale Maintain home respiratory medicine   Other Bronchodilator Brovana bid   Atelectasis Care Plan   Rationale No Rational Found   Airway Clearance Care Plan   Rationale No rationale found

## 2025-05-08 NOTE — ASSESSMENT & PLAN NOTE
[id number] Score: 3. The patients heart rate in the last 24 hours is as follows:  Pulse  Min: 68  Max: 92     Antiarrhythmics  amiodarone tablet 100 mg, Daily, Oral    Anticoagulants  apixaban tablet 5 mg, 2 times daily, Oral

## 2025-05-08 NOTE — PLAN OF CARE
Problem: Physical Therapy  Goal: Physical Therapy Goal  Description: Goals to be met by: 2025     Patient will increase functional independence with mobility by performin. Supine to sit with Modified [place]  2. Sit to stand transfer with Modified [place]  3. Gait  x 150  feet with Modified [place] using Rolling Walker.     Outcome: Progressing

## 2025-05-08 NOTE — SUBJECTIVE & OBJECTIVE
Interval History:   Denies any complaints at present.  Creatinine 2.3 this a.m.  Review of Systems   Constitutional:  Positive for activity change. Negative for appetite change, chills, diaphoresis and fever.   Respiratory:  Negative for shortness of breath.    Cardiovascular:  Negative for chest pain.   Gastrointestinal:  Negative for abdominal pain, anal bleeding, diarrhea, nausea and vomiting.     Objective:     Vital Signs (Most Recent):  Temp: 98 °F (36.7 °C) (05/08/25 0727)  Pulse: 92 (05/08/25 0734)  Resp: 12 (05/08/25 0734)  BP: 119/79 (05/08/25 0727)  SpO2: 98 % (05/08/25 0733) Vital Signs (24h Range):  Temp:  [97.7 °F (36.5 °C)-98.2 °F (36.8 °C)] 98 °F (36.7 °C)  Pulse:  [68-92] 92  Resp:  [12-19] 12  SpO2:  [89 %-98 %] 98 %  BP: (102-136)/(68-79) 119/79     Weight: 85.5 kg (188 lb 7.9 oz)  Body mass index is 33.39 kg/m².    Intake/Output Summary (Last 24 hours) at 5/8/2025 1138  Last data filed at 5/8/2025 0527  Gross per 24 hour   Intake 3465.14 ml   Output 800 ml   Net 2665.14 ml         Physical Exam  Constitutional:       Appearance: Normal appearance.   HENT:      Head: Normocephalic.      Mouth/Throat:      Mouth: Mucous membranes are moist.      Pharynx: Oropharynx is clear.   Eyes:      Extraocular Movements: Extraocular movements intact.   Cardiovascular:      Rate and Rhythm: Normal rate and regular rhythm.      Pulses: Normal pulses.      Heart sounds: Normal heart sounds.   Pulmonary:      Effort: Pulmonary effort is normal.      Breath sounds: Normal breath sounds.   Abdominal:      General: Bowel sounds are normal. There is no distension.      Palpations: Abdomen is soft.      Tenderness: There is no abdominal tenderness. There is no guarding.   Skin:     Capillary Refill: Capillary refill takes less than 2 seconds.   Neurological:      Mental Status: She is alert and oriented to person, place, and time.   Psychiatric:         Mood and Affect: Mood normal.               Significant Labs: All  pertinent labs within the past 24 hours have been reviewed.    Significant Imaging: I have reviewed all pertinent imaging results/findings within the past 24 hours.

## 2025-05-08 NOTE — PT/OT/SLP PROGRESS
Physical Therapy Treatment    Patient Name:  Iris Mueller   MRN:  [id number]    Recommendations:     Discharge Recommendations: Low Intensity Therapy  Discharge Equipment Recommendations: none  Barriers to discharge: pain, medical status, decreased endurance    Assessment:     Iris Mueller is a 69 y.o. female admitted with a medical diagnosis of JEFF (acute kidney injury).  She presents with the following impairments/functional limitations: weakness, impaired endurance, gait instability, impaired balance, impaired functional mobility, decreased safety awareness, pain, impaired cardiopulmonary response to activity.    Pt agreeable to visit. Pt reports pain in B knee. Pt reports that she has been having falls recently due to her knees buckling. Pt on 2 L O2 via NC but removed for ambulation reporting that she does not use oxygen at home. Pt performed supine to sit transfer with stand by assist.    Pt performed sit to stand transfer with RW and contact guard assist. Pt ambulated 60' with RW and contact guard assist. Pt with one episode of bending knees during ambulation to show how they buckle. Pt returned to sitting EOB and agreeable to sit up in chair. SPO2 92% on RA post ambulation.    Pt performed bed to chair transfer with RW and contact guard assist. Pt request coffee, nurse okayed.     Pt left up in chair with coffee and all needs met. Pt donned NC 2 L O2 at end of session.    Rehab Prognosis: Fair; patient would benefit from acute skilled PT services to address these deficits and reach maximum level of function.    Recent Surgery: * No surgery found *      Plan:     During this hospitalization, patient to be seen 6 x/week to address the identified rehab impairments via gait training, therapeutic activities, therapeutic exercises and progress toward the following goals:    Plan of Care Expires:       Subjective     Chief Complaint: B knee pain  Patient/Family Comments/goals: to get  better  Pain/Comfort:  Pain Rating 1: other (see comments) (not rated)  Location - Side 1: Bilateral  Location - Orientation 1: generalized  Location 1: knee  Pain Addressed 1: Reposition, Distraction, Cessation of Activity  Pain Rating Post-Intervention 1: other (see comments) (not rated)      Objective:     Communicated with nurse prior to session.  Patient found HOB elevated with bed alarm, telemetry, oxygen upon PT entry to room.     General Precautions: Standard, fall  Orthopedic Precautions: N/A  Braces: N/A  Respiratory Status: Nasal cannula, flow 2 L/min     Functional Mobility:  Bed Mobility:     Supine to Sit: stand by assistance  Transfers:     Sit to Stand:  contact guard assistance with rolling walker  Bed to Chair: contact guard assistance with  rolling walker  using  Step Transfer  Gait: x 60' with RW and contact guard assist      AM-PAC 6 CLICK MOBILITY          Treatment & Education:  Pt educated on importance of time OOB, importance of intermittent mobility, safe techniques for transfers/ambulation, discharge recommendations/options, and use of call light for assistance and fall prevention.      Patient left up in chair with all lines intact, call button in reach, chair alarm on, and nurse notified..    GOALS:   Multidisciplinary Problems       Physical Therapy Goals          Problem: Physical Therapy    Goal Priority Disciplines Outcome Interventions   Physical Therapy Goal     PT, PT/OT     Description: Goals to be met by: 2025     Patient will increase functional independence with mobility by performin. Supine to sit with Modified [place]  2. Sit to stand transfer with Modified [place]  3. Gait  x 150  feet with Modified [place] using Rolling Walker.                          DME Justifications:  No DME recommended requiring DME justifications    Time Tracking:     PT Received On: 25  PT Start Time: 1014     PT Stop Time: 1037  PT Total Time (min): 23 min      Billable Minutes: Gait Training 10 and Therapeutic Activity 13    Treatment Type: Treatment  PT/PTA: PTA     Number of PTA visits since last PT visit: 1 05/08/2025

## 2025-05-08 NOTE — PROGRESS NOTES
[facility] Medicine  Progress Note    Patient Name: Iris Mueller  MRN: [id number]  Patient Class: IP- Inpatient   Admission Date: 5/6/2025  Length of Stay: 1 days  Attending Physician: Junie Frias MD  Primary Care Provider: Elkin You MD        Subjective     Principal Problem:JEFF (acute kidney injury)        HPI:  69 year old pt getting admitted with recurrent falls /JEFF  Pt also c/o back pain but she said its been there since she had back surgery long time back  Because of the falls she came to ER and found to have JEFF and got admitted  She was sleepy and was little confused upon presentation  Her condition improved with iv fluids and she was no longer confused when saw in ER  Pt denied any other issues or c/o    Overview/Hospital Course:  The patient was admitted to the hospital medicine service and closely monitored for her recurrent falls and acute kidney injury.  The patient was maintained on IV fluids.  Nephrology was consulted.  Nephrotoxic medications were held.  Nephrology was consulted.  Imaging of head neck and shoulder were negative for acute findings.  PT and OT were consulted.    Interval History:   Denies any complaints at present.  Creatinine 2.3 this a.m.  Review of Systems   Constitutional:  Positive for activity change. Negative for appetite change, chills, diaphoresis and fever.   Respiratory:  Negative for shortness of breath.    Cardiovascular:  Negative for chest pain.   Gastrointestinal:  Negative for abdominal pain, anal bleeding, diarrhea, nausea and vomiting.     Objective:     Vital Signs (Most Recent):  Temp: 98 °F (36.7 °C) (05/08/25 0727)  Pulse: 92 (05/08/25 0734)  Resp: 12 (05/08/25 0734)  BP: 119/79 (05/08/25 0727)  SpO2: 98 % (05/08/25 0733) Vital Signs (24h Range):  Temp:  [97.7 °F (36.5 °C)-98.2 °F (36.8 °C)] 98 °F (36.7 °C)  Pulse:  [68-92] 92  Resp:  [12-19] 12  SpO2:  [89 %-98 %] 98 %  BP: (102-136)/(68-79) 119/79     Weight:  85.5 kg (188 lb 7.9 oz)  Body mass index is 33.39 kg/m².    Intake/Output Summary (Last 24 hours) at 5/8/2025 1138  Last data filed at 5/8/2025 0527  Gross per 24 hour   Intake 3465.14 ml   Output 800 ml   Net 2665.14 ml         Physical Exam  Constitutional:       Appearance: Normal appearance.   HENT:      Head: Normocephalic.      Mouth/Throat:      Mouth: Mucous membranes are moist.      Pharynx: Oropharynx is clear.   Eyes:      Extraocular Movements: Extraocular movements intact.   Cardiovascular:      Rate and Rhythm: Normal rate and regular rhythm.      Pulses: Normal pulses.      Heart sounds: Normal heart sounds.   Pulmonary:      Effort: Pulmonary effort is normal.      Breath sounds: Normal breath sounds.   Abdominal:      General: Bowel sounds are normal. There is no distension.      Palpations: Abdomen is soft.      Tenderness: There is no abdominal tenderness. There is no guarding.   Skin:     Capillary Refill: Capillary refill takes less than 2 seconds.   Neurological:      Mental Status: She is alert and oriented to person, place, and time.   Psychiatric:         Mood and Affect: Mood normal.               Significant Labs: All pertinent labs within the past 24 hours have been reviewed.    Significant Imaging: I have reviewed all pertinent imaging results/findings within the past 24 hours.      Assessment & Plan  JEFF (acute kidney injury)  Maintain iv fluids  Stop all nephrotoxins    Latest Reference Range & Units 01/27/25 06:21 01/28/25 06:48 04/22/25 01:17 05/06/25 19:12   Creatinine 0.5 - 1.4 mg/dL 1.6 (H) 1.6 (H) 2.2 (H) 3.1 (H)   (H): Data is abnormally high  Recurrent falls  PT/OT  CT brain/CT Cervical spine WNL    CKD (chronic kidney disease) stage 4, GFR 15-29 ml/min  Creatine stable for now. BMP reviewed- noted Estimated Creatinine Clearance: 23.2 mL/min (A) (based on SCr of 2.3 mg/dL (H)). according to latest data. Based on current GFR, CKD stage is stage 4 - GFR 15-29.  Monitor UOP and  serial BMP and adjust therapy as needed. Renally dose meds. Avoid nephrotoxic medications and procedures.  Chronic obstructive pulmonary disease  Patient's COPD is controlled currently.  Patient is currently off COPD Pathway. Continue scheduled inhalers Nebs, Steroids, and Supplemental oxygen and monitor respiratory status closely.   Chronic combined systolic and diastolic heart failure  Stable  Hold Torsemide and aldactone at the moment in view with JEFF  Doesn't look hypervolemic at the moment     Coronary artery disease involving native coronary artery of native heart without angina pectoris  Stable   Obesity  Body mass index is 33.39 kg/m². Morbid obesity complicates all aspects of disease management from diagnostic modalities to treatment.  Atrial fibrillation  [id number] Score: 3. The patients heart rate in the last 24 hours is as follows:  Pulse  Min: 68  Max: 92     Antiarrhythmics  amiodarone tablet 100 mg, Daily, Oral    Anticoagulants  apixaban tablet 5 mg, 2 times daily, Oral    Hypertension  Patient's blood pressure range in the last 24 hours was: BP  Min: 102/68  Max: 136/76.The patient's inpatient anti-hypertensive regimen is listed below:  Current Antihypertensives  , Daily, Oral    Plan  - BP is controlled, no changes needed to their regimen  - holding antihypertensives at present given JEFF with hypotension.  Adjust as needed.  VTE Risk Mitigation (From admission, onward)           Ordered     apixaban tablet 5 mg  2 times daily         05/06/25 2151     IP VTE HIGH RISK PATIENT  Once         05/06/25 2151     Place sequential compression device  Until discontinued         05/06/25 2151                    Discharge Planning   VALERIANO: 5/10/2025     Code Status: Full Code   Medical Readiness for Discharge Date:   Discharge Plan A: Home Health                Please place Justification for DME        Junie Frias MD, MD  Department of Hospital Medicine   Critical access hospital

## 2025-05-08 NOTE — PROGRESS NOTES
INPATIENT NEPHROLOGY CONSULT   [facility]    Patient Name: Iris Mueller  Date: 05/08/2025    Reason for consultation:    Chief Complaint:   Chief Complaint   Patient presents with    Fatigue    Fall     3 times today, hematoma above right eye    Weakness       History of Present Illness:  69 year old pt getting admitted with recurrent falls /JEFF. Pt also c/o back pain but she said its been there since she had back surgery long time back. Because of the falls she came to ER and found to have JEFF and got admitted.She was sleepy and was little confused upon presentation. Her condition improved with iv fluids and she was no longer confused. Currently in observation. Consulted for JEFF.    Interval History:  5/7- hypotensive at admission, on 2L NC, SCr 2.2 in April, up to 3.1 today- on NS 100cc/hr, UA bland,  (628), trop flat, lactic acid normal, CXR clear, CT head/C spine neg  5/8  VSS.  800 ml UOP recorded for one shift.  Renal function improved.  No complaints.    Plan of Care:    Assessment:  JEFF/CKD III  Baseline severe AS with CHF  Baseline HTN  Baseline AF  Hyponatremia  SHPT  Anemia of CKD    Plan:    - suspect JEFF from hypotension/volume depletion- continue NS 100cc/hr- hold bumex, aldactone  - compensated on exam right now- 2g salt, 1.5-2L fluid restriction  - defer metoprolol, norvasc to HM  - on amio, eliquis  - trend serum Na with IVFs  - no active BMM issues  - recheck iron stores to see if she needs iron or DENEEN    Thank you for allowing us to participate in this patient's care. We will continue to follow.    Vital Signs:  Temp Readings from Last 3 Encounters:   05/08/25 98 °F (36.7 °C)   01/28/25 98.2 °F (36.8 °C) (Oral)   12/17/24 97.6 °F (36.4 °C) (Oral)       Pulse Readings from Last 3 Encounters:   05/08/25 92   03/06/25 79   02/20/25 69       BP Readings from Last 3 Encounters:   05/08/25 119/79   04/18/25 115/70   03/06/25 130/70       Weight:  Wt Readings from  Last 3 Encounters:   25 85.5 kg (188 lb 7.9 oz)   25 89.4 kg (197 lb 1.6 oz)   25 98.9 kg (218 lb)       INS/OUTS:  I/O last 3 completed shifts:  In: 3705.1 [P.O.:960; I.V.:2745.1]  Out: 800 [Urine:800]  No intake/output data recorded.    Past Medical & Surgical History:  Past Medical History:   Diagnosis Date    Anemia, unspecified     Arthritis     Asthma     COPD (chronic obstructive pulmonary disease)     Encounter for blood transfusion     Hypertension     Obese body habitus     Wound infection 2019    Right knee       Past Surgical History:   Procedure Laterality Date    ANGIOGRAM, CORONARY, WITH LEFT HEART CATHETERIZATION N/A 2022    Procedure: Angiogram, Coronary, with Left Heart Cath;  Surgeon: Jorge Tran MD;  Location: [facility] CATH/EP LAB;  Service: Cardiology;  Laterality: N/A;     SECTION      ECHOCARDIOGRAM,TRANSESOPHAGEAL N/A 2023    Procedure: Transesophageal echo (MARIANO) intra-procedure log documentation;  Surgeon: Araceli Sue Diagnostic;  Location: [facility] EP LAB;  Service: Cardiology;  Laterality: N/A;    INCISION AND DRAINAGE OF HEMATOMA Left 2024    Procedure: INCISION AND DRAINAGE, HEMATOMA;  Surgeon: Bryson Huerta MD;  Location: [facility] OR;  Service: Orthopedics;  Laterality: Left;    JOINT REPLACEMENT      Knee    KNEE ARTHROPLASTY Right 2019    Procedure: ARTHROPLASTY, KNEE;  Surgeon: Delio Chung MD;  Location: [facility] OR;  Service: Orthopedics;  Laterality: Right;  anesthesia:  general and block    REPLACEMENT OF WOUND VACUUM-ASSISTED CLOSURE DEVICE Right 2019    Procedure: REPLACEMENT, WOUND VAC;  Surgeon: Delio Chung MD;  Location: [facility] OR;  Service: Orthopedics;  Laterality: Right;    TONSILLECTOMY      TREATMENT OF CARDIAC ARRHYTHMIA N/A 2023    Procedure: Cardioversion or Defibrillation;  Surgeon: PJ Betancourt MD;  Location: [facility] EP LAB;  Service: Cardiology;  Laterality: N/A;  AF, MARIANO, DCCV, MAC, EH, 3  Prep    VENTRICULOGRAM, LEFT  2022    Procedure: Ventriculogram, Left;  Surgeon: Jorge Tran MD;  Location: [facility] CATH/EP LAB;  Service: Cardiology;;       Past Social History:  Social History     Socioeconomic History    Marital status:    Tobacco Use    Smoking status: Former     Current packs/day: 0.00     Average packs/day: 0.5 packs/day for 50.0 years (23.7 ttl pk-yrs)     Types: Cigarettes     Start date:      Quit date: 2024     Years since quittin.3     Passive exposure: Current    Smokeless tobacco: Never    Tobacco comments:     .   Substance and Sexual Activity    Alcohol use: Yes     Comment: RARELY    Drug use: Never    Sexual activity: Not Currently     Partners: Male     Social Drivers of Health     Financial Resource Strain: Patient Declined (2025)    Overall Financial Resource Strain (CARDIA)     Difficulty of Paying Living Expenses: Patient declined   Food Insecurity: Patient Declined (2025)    Hunger Vital Sign     Worried About Running Out of Food in the Last Year: Patient declined     Ran Out of Food in the Last Year: Patient declined   Transportation Needs: Patient Declined (2025)    PRAPARE - Transportation     Lack of Transportation (Medical): Patient declined     Lack of Transportation (Non-Medical): Patient declined   Physical Activity: Patient Declined (2022)    Exercise Vital Sign     Days of Exercise per Week: Patient declined     Minutes of Exercise per Session: Patient declined   Stress: Patient Declined (2025)    [employer] [place] [employer] - Occupational Stress Questionnaire     Feeling of Stress : Patient declined   Housing Stability: Patient Declined (2025)    Housing Stability Vital Sign     Unable to Pay for Housing in the Last Year: Patient declined     Homeless in the Last Year: Patient declined       Medications:  Scheduled Meds:   amiodarone  100 mg Oral Daily    apixaban  5 mg Oral BID    budesonide  1 mg  "Nebulization Q12H    And    arformoteroL  15 mcg Nebulization BID    ipratropium  0.5 mg Nebulization Q6H    pantoprazole  40 mg Oral Before breakfast     Continuous Infusions:   0.9% NaCl   Intravenous Continuous 100 mL/hr at 05/08/25 0523 Rate Verify at 05/08/25 0523     PRN Meds:.  Current Facility-Administered Medications:     acetaminophen, 650 mg, Oral, Q4H PRN    aluminum-magnesium hydroxide-simethicone, 30 mL, Oral, QID PRN    HYDROcodone-acetaminophen, 1 tablet, Oral, Q6H PRN    magnesium oxide, 800 mg, Oral, PRN    magnesium oxide, 800 mg, Oral, PRN    melatonin, 6 mg, Oral, Nightly PRN    naloxone, 0.02 mg, Intravenous, PRN    ondansetron, 4 mg, Intravenous, Q6H PRN    potassium bicarbonate, 35 mEq, Oral, PRN    potassium bicarbonate, 50 mEq, Oral, PRN    potassium bicarbonate, 60 mEq, Oral, PRN    potassium, sodium phosphates, 2 packet, Oral, PRN    potassium, sodium phosphates, 2 packet, Oral, PRN    potassium, sodium phosphates, 2 packet, Oral, PRN  Medications Ordered Prior to Encounter[1]    Allergies:  Patient has no known allergies.    Past Family History:  Reviewed; refer to Hospitalist Admission Note    Review of Systems:  Review of Systems - All 14 systems reviewed and negative, except as noted in HPI    Physical Exam:  /79   Pulse 92   Temp 98 °F (36.7 °C)   Resp 12   Ht 5' 3" (1.6 m)   Wt 85.5 kg (188 lb 7.9 oz)   SpO2 98%   BMI 33.39 kg/m²     General Appearance:    NAD, AAO x 3, cooperative, appears stated age   Head:    Normocephalic, atraumatic   Eyes:    PER, EOMI, and conjunctiva/sclera clear bilaterally        Mouth:   Moist mucus membranes, no thrush or oral lesions, normal      dentition   Back:     No CVA tenderness   Lungs:     Clear to auscultation bilaterally, no wheeze, crackles, rales      or rhonchi, symmetric air movement, respirations unlabored   Heart:    Regular rate and rhythm, S1 and S2 normal, no murmur, rub   or gallop   Abdomen:     Soft, non-tender, " non-distended, bowel sounds active all four   quadrants, no RT or guarding, no masses, no organomegaly   Extremities:   Warm and well perfused, distal pulses intact, no cyanosis or    peripheral edema   MSK:   No joint or muscle swelling, tenderness or deformity   Skin:   Skin color, texture, turgor normal, no rashes or lesions   Neurologic/Psychiatric:   CNII-XII intact, normal strength and sensation       throughout, no asterixis; normal affect, memory, judgement     and insight     Results:  Lab Results   Component Value Date     (L) 05/08/2025    K 4.0 05/08/2025     05/08/2025    CO2 25 05/08/2025    BUN 37 (H) 05/08/2025    CREATININE 2.3 (H) 05/08/2025    CALCIUM 8.7 05/08/2025    ANIONGAP 8 05/08/2025    ESTGFRAFRICA 52 (L) 04/25/2022    EGFRNONAA 45 (L) 04/25/2022       Lab Results   Component Value Date    CALCIUM 8.7 05/08/2025    PHOS 3.9 01/28/2025       Recent Labs   Lab 05/08/25  0506   WBC 8.16   RBC 3.71*   HGB 9.8*   HCT 33.4*      MCV 90   MCH 26.4*   MCHC 29.3*       I have personally reviewed pertinent radiological imaging and reports from this admission.    I have spent > 70 minutes providing care for this patient for the above diagnoses. These services have included chart/data/imaging review, evaluation, exam, formulation of plan, , note preparation, and discussions with staff involved in this patient's care.    Cristina Hahn NP    [facility]  [address] [place] [id number]  [phone] (p)  [phone] (f)         [1]   Current Facility-Administered Medications on File Prior to Encounter   Medication Dose Route Frequency Provider Last Rate Last Admin    lidocaine (PF) 10 mg/ml (1%) injection 5 mg  0.5 mL Intradermal Once Azeem Anderson MD         Current Outpatient Medications on File Prior to Encounter   Medication Sig Dispense Refill    furosemide (LASIX) 40 MG tablet Take 40 mg by mouth once daily.      albuterol-ipratropium  (DUO-NEB) 2.5 mg-0.5 mg/3 mL nebulizer solution Take 3 mLs by nebulization every 4 (four) hours as needed for Wheezing or Shortness of Breath. Rescue 75 mL 0    amiodarone (PACERONE) 200 MG Tab Take 0.5 tablets (100 mg total) by mouth once daily. 90 tablet 3    amLODIPine (NORVASC) 2.5 MG tablet Take 1 tablet (2.5 mg total) by mouth every evening. 90 tablet 3    apixaban (ELIQUIS) 5 mg Tab Take 1 tablet (5 mg total) by mouth 2 (two) times daily. 60 tablet 11    aspirin (ECOTRIN) 81 MG EC tablet Take 1 tablet (81 mg total) by mouth once daily. 30 tablet 0    atorvastatin (LIPITOR) 10 MG tablet Take 1 tablet (10 mg total) by mouth every evening. 90 tablet 3    budesonide-glycopyr-formoterol (BREZTRI AEROSPHERE) 160-9-4.8 mcg/actuation HFAA Inhale 2 puffs into the lungs 2 (two) times a day.      bumetanide (BUMEX) 1 MG tablet Take 1 tablet (1 mg total) by mouth once daily. (Patient taking differently: Take 1 mg by mouth 2 (two) times a day.) 90 tablet 4    buPROPion (WELLBUTRIN SR) 150 MG TBSR 12 hr tablet Take 1 tablet (150 mg total) by mouth 2 (two) times daily. 180 tablet 3    FLUoxetine 40 MG capsule Take 1 capsule (40 mg total) by mouth once daily. 90 capsule 3    gabapentin (NEURONTIN) 300 MG capsule Take 1 capsule (300 mg total) by mouth nightly as needed (pain). (Patient taking differently: Take 300 mg by mouth once daily.) 30 capsule 2    iron polysaccharides (NIFEREX) 150 mg iron Cap Take 1 capsule (150 mg total) by mouth once daily. 90 capsule 0    metoprolol succinate (TOPROL-XL) 50 MG 24 hr tablet Take 1 tablet (50 mg total) by mouth 2 (two) times daily. 180 tablet 3    mirtazapine (REMERON) 15 MG tablet Take 1 tablet (15 mg total) by mouth every evening. For sleep 30 tablet 3    mupirocin (BACTROBAN) 2 % ointment Apply topically 2 (two) times daily. To infected skin tear 22 g 1    potassium chloride SA (K-DUR,KLOR-CON M) 10 MEQ tablet Take 1 tablet (10 mEq total) by mouth once daily. 30 tablet 5     spironolactone (ALDACTONE) 25 MG tablet Take 1 tablet (25 mg total) by mouth once daily. 90 tablet 3    triamcinolone acetonide 0.1% (KENALOG) 0.1 % cream Apply topically 2 (two) times daily. 60 g 2    warfarin (COUMADIN) 2.5 MG tablet Take 1-1.5 Tablets QPM as instructed by coumadin clinic 40 tablet 3

## 2025-05-08 NOTE — CARE UPDATE
05/08/25 0734   PRE-TX-O2   Pulse 92   Resp 12   Aerosol Therapy   $ Aerosol Therapy Charges Aerosol Treatment   Daily Review of Necessity (SVN) completed   Respiratory Treatment Status (SVN) given   Treatment Route (SVN) mask;oxygen   Patient Position HOB elevated   Post Treatment Assessment (SVN) breath sounds unchanged   Signs of Intolerance (SVN) none   Breath Sounds Post-Respiratory Treatment   Throughout All Fields Post-Treatment All Fields   Throughout All Fields Post-Treatment no change   Post-treatment Heart Rate (beats/min) 86   Post-treatment Resp Rate (breaths/min) 14

## 2025-05-09 LAB
ABSOLUTE EOSINOPHIL (SMH): 0.17 K/UL
ABSOLUTE MONOCYTE (SMH): 0.51 K/UL (ref 0.3–1)
ABSOLUTE NEUTROPHIL COUNT (SMH): 6.2 K/UL (ref 1.8–7.7)
ALBUMIN SERPL-MCNC: 3.5 G/DL (ref 3.5–5.2)
ALP SERPL-CCNC: 156 UNIT/L (ref 55–135)
ALT SERPL-CCNC: 34 UNIT/L (ref 10–44)
ANION GAP (SMH): 8 MMOL/L (ref 8–16)
AST SERPL-CCNC: 22 UNIT/L (ref 10–40)
BACTERIA UR CULT: NO GROWTH
BASOPHILS # BLD AUTO: 0.02 K/UL
BASOPHILS NFR BLD AUTO: 0.3 %
BILIRUB SERPL-MCNC: 0.7 MG/DL (ref 0.1–1)
BUN SERPL-MCNC: 27 MG/DL (ref 8–23)
CALCIUM SERPL-MCNC: 8.9 MG/DL (ref 8.7–10.5)
CHLORIDE SERPL-SCNC: 104 MMOL/L (ref 95–110)
CO2 SERPL-SCNC: 22 MMOL/L (ref 23–29)
CREAT SERPL-MCNC: 1.8 MG/DL (ref 0.5–1.4)
ERYTHROCYTE [DISTWIDTH] IN BLOOD BY AUTOMATED COUNT: 17.2 % (ref 11.5–14.5)
GFR SERPLBLD CREATININE-BSD FMLA CKD-EPI: 30 ML/MIN/1.73/M2
GLUCOSE SERPL-MCNC: 93 MG/DL (ref 70–110)
HCT VFR BLD AUTO: 32.4 % (ref 37–48.5)
HGB BLD-MCNC: 9.5 GM/DL (ref 12–16)
IMM GRANULOCYTES # BLD AUTO: 0.04 K/UL (ref 0–0.04)
IMM GRANULOCYTES NFR BLD AUTO: 0.5 % (ref 0–0.5)
LYMPHOCYTES # BLD AUTO: 0.75 K/UL (ref 1–4.8)
MAGNESIUM SERPL-MCNC: 1.8 MG/DL (ref 1.6–2.6)
MCH RBC QN AUTO: 26.6 PG (ref 27–31)
MCHC RBC AUTO-ENTMCNC: 29.3 G/DL (ref 32–36)
MCV RBC AUTO: 91 FL (ref 82–98)
NUCLEATED RBC (/100WBC) (SMH): 0 /100 WBC
PLATELET # BLD AUTO: 230 K/UL (ref 150–450)
PMV BLD AUTO: 9.1 FL (ref 9.2–12.9)
POTASSIUM SERPL-SCNC: 4.1 MMOL/L (ref 3.5–5.1)
PROT SERPL-MCNC: 6.5 GM/DL (ref 6–8.4)
RBC # BLD AUTO: 3.57 M/UL (ref 4–5.4)
RELATIVE EOSINOPHIL (SMH): 2.2 % (ref 0–8)
RELATIVE LYMPHOCYTE (SMH): 9.7 % (ref 18–48)
RELATIVE MONOCYTE (SMH): 6.6 % (ref 4–15)
RELATIVE NEUTROPHIL (SMH): 80.7 % (ref 38–73)
SODIUM SERPL-SCNC: 134 MMOL/L (ref 136–145)
WBC # BLD AUTO: 7.72 K/UL (ref 3.9–12.7)

## 2025-05-09 PROCEDURE — 12000002 HC ACUTE/MED SURGE SEMI-PRIVATE ROOM

## 2025-05-09 PROCEDURE — 99900035 HC TECH TIME PER 15 MIN (STAT)

## 2025-05-09 PROCEDURE — 83735 ASSAY OF MAGNESIUM: CPT | Performed by: INTERNAL MEDICINE

## 2025-05-09 PROCEDURE — 25000242 PHARM REV CODE 250 ALT 637 W/ HCPCS: Performed by: INTERNAL MEDICINE

## 2025-05-09 PROCEDURE — 97535 SELF CARE MNGMENT TRAINING: CPT

## 2025-05-09 PROCEDURE — 94761 N-INVAS EAR/PLS OXIMETRY MLT: CPT

## 2025-05-09 PROCEDURE — 94640 AIRWAY INHALATION TREATMENT: CPT

## 2025-05-09 PROCEDURE — 25000003 PHARM REV CODE 250: Performed by: INTERNAL MEDICINE

## 2025-05-09 PROCEDURE — 11000001 HC ACUTE MED/SURG PRIVATE ROOM

## 2025-05-09 PROCEDURE — 80053 COMPREHEN METABOLIC PANEL: CPT | Performed by: INTERNAL MEDICINE

## 2025-05-09 PROCEDURE — 99900031 HC PATIENT EDUCATION (STAT)

## 2025-05-09 PROCEDURE — 36415 COLL VENOUS BLD VENIPUNCTURE: CPT | Performed by: INTERNAL MEDICINE

## 2025-05-09 PROCEDURE — 27000221 HC OXYGEN, UP TO 24 HOURS

## 2025-05-09 PROCEDURE — 85025 COMPLETE CBC W/AUTO DIFF WBC: CPT | Performed by: INTERNAL MEDICINE

## 2025-05-09 RX ORDER — METOPROLOL SUCCINATE 50 MG/1
50 TABLET, EXTENDED RELEASE ORAL DAILY
Status: DISCONTINUED | OUTPATIENT
Start: 2025-05-09 | End: 2025-05-10 | Stop reason: HOSPADM

## 2025-05-09 RX ORDER — LANOLIN ALCOHOL/MO/W.PET/CERES
1 CREAM (GRAM) TOPICAL DAILY
Status: DISCONTINUED | OUTPATIENT
Start: 2025-05-09 | End: 2025-05-10 | Stop reason: HOSPADM

## 2025-05-09 RX ORDER — AMLODIPINE BESYLATE 2.5 MG/1
2.5 TABLET ORAL DAILY
Status: DISCONTINUED | OUTPATIENT
Start: 2025-05-09 | End: 2025-05-10 | Stop reason: HOSPADM

## 2025-05-09 RX ADMIN — HYDROCODONE BITARTRATE AND ACETAMINOPHEN 1 TABLET: 5; 325 TABLET ORAL at 10:05

## 2025-05-09 RX ADMIN — APIXABAN 5 MG: 5 TABLET, FILM COATED ORAL at 09:05

## 2025-05-09 RX ADMIN — BUDESONIDE INHALATION 1 MG: 0.5 SUSPENSION RESPIRATORY (INHALATION) at 07:05

## 2025-05-09 RX ADMIN — Medication 6 MG: at 01:05

## 2025-05-09 RX ADMIN — PANTOPRAZOLE SODIUM 40 MG: 40 TABLET, DELAYED RELEASE ORAL at 04:05

## 2025-05-09 RX ADMIN — SODIUM CHLORIDE: 9 INJECTION, SOLUTION INTRAVENOUS at 09:05

## 2025-05-09 RX ADMIN — AMLODIPINE BESYLATE 2.5 MG: 2.5 TABLET ORAL at 02:05

## 2025-05-09 RX ADMIN — METOPROLOL SUCCINATE 50 MG: 50 TABLET, EXTENDED RELEASE ORAL at 02:05

## 2025-05-09 RX ADMIN — HYDROCODONE BITARTRATE AND ACETAMINOPHEN 1 TABLET: 5; 325 TABLET ORAL at 04:05

## 2025-05-09 RX ADMIN — IPRATROPIUM BROMIDE 0.5 MG: 0.5 SOLUTION RESPIRATORY (INHALATION) at 07:05

## 2025-05-09 RX ADMIN — IPRATROPIUM BROMIDE 0.5 MG: 0.5 SOLUTION RESPIRATORY (INHALATION) at 01:05

## 2025-05-09 RX ADMIN — ARFORMOTEROL TARTRATE 15 MCG: 15 SOLUTION RESPIRATORY (INHALATION) at 07:05

## 2025-05-09 RX ADMIN — AMIODARONE HYDROCHLORIDE 100 MG: 100 TABLET ORAL at 09:05

## 2025-05-09 RX ADMIN — HYDROCODONE BITARTRATE AND ACETAMINOPHEN 1 TABLET: 5; 325 TABLET ORAL at 07:05

## 2025-05-09 RX ADMIN — APIXABAN 5 MG: 5 TABLET, FILM COATED ORAL at 07:05

## 2025-05-09 RX ADMIN — FERROUS SULFATE TAB EC 325 MG (65 MG FE EQUIVALENT) 1 EACH: 325 (65 FE) TABLET DELAYED RESPONSE at 10:05

## 2025-05-09 NOTE — CONSULTS
05/08/25 1537   WOCN Assessment   WOCN Total Time (mins) 30   Visit Date 05/08/25   Visit Time 1500   Consult Type New   WOCN Speciality Wound   Wound moisture;skin tear   Intervention assessed;applied;orders;coordination of care   Teaching on-going        Wound 05/07/25 1830 Moisture associated dermatitis Right Breast   Date First Assessed/Time First Assessed: 05/07/25 1830   Primary Wound Type: (c) Moisture associated dermatitis  Side: Right  Location: Breast   Dressing Appearance Open to air   Drainage Amount None   Appearance Pink;Red   Care Cleansed with:;Antimicrobial agent;Soap and water;Applied:;Skin Barrier   Dressing Applied  (Triad and miconazole powder)        Wound 05/07/25 1830 Moisture associated dermatitis Left Breast   Date First Assessed/Time First Assessed: 05/07/25 1830   Primary Wound Type: (c) Moisture associated dermatitis  Side: Left  Location: Breast   Dressing Appearance Open to air   Drainage Amount None   Appearance Pink;Red   Care Cleansed with:;Antimicrobial agent;Soap and water;Applied:;Skin Barrier   Dressing Applied  (Triad and miconazole powder)        Wound 05/07/25 1834 Abrasion(s) Left Arm   Date First Assessed/Time First Assessed: 05/07/25 1834   Primary Wound Type: Abrasion(s)  Side: Left  Location: Arm   Dressing Appearance Open to air   Drainage Amount None   Appearance Pink   Care Cleansed with:;Antimicrobial agent;Soap and water;Applied:;Skin Barrier        Wound 01/19/25 0017 Abrasion(s) Right Knee   Date First Assessed/Time First Assessed: 01/19/25 0017   Primary Wound Type: Abrasion(s)  Side: Right  Location: Knee   Dressing Appearance Open to air   Drainage Amount None   Appearance Intact;Pink   Care Cleansed with:;Antimicrobial agent;Soap and water;Applied:;Skin Barrier        Wound 01/19/25 0018 Abrasion(s) Left Knee   Date First Assessed/Time First Assessed: 01/19/25 0018   Present on Original Admission: Yes  Primary Wound Type: Abrasion(s)  Side: Left  Location:  Knee   Dressing Appearance Open to air   Drainage Amount None   Appearance Pink;Intact   Care Cleansed with:;Antimicrobial agent;Soap and water;Applied:;Skin Barrier     Patient with pink areas where abrasions are healing at this assessment. Buttocks and heels remain intact. Triad to all areas every shift and miconazole powder to areas beneath breasts as ordered. Wound care team to follow up as needed throughout hospital stay.

## 2025-05-09 NOTE — PLAN OF CARE
Problem: Occupational Therapy  Goal: Occupational Therapy Goal  Description: Goals to be met by: 6/7/2025     Patient will increase functional independence with ADLs by performing:    UE Dressing with Modified [place].  LE Dressing with Modified [place].  Grooming while standing at sink with Modified [place].- while seated at wink with [place]   Toileting from toilet with Modified [place] for hygiene and clothing management. -Met today  Supine to sit with Modified [place].-MET today  Step transfer with Modified [place]- SBA/RW for IV pole and line mgmnt  Toilet transfer to toilet with Modified [place]. -with RW and SBA for IV pole mgmnt.    Outcome: Progressing

## 2025-05-09 NOTE — ASSESSMENT & PLAN NOTE
[id number] Score: 3. The patients heart rate in the last 24 hours is as follows:  Pulse  Min: 82  Max: 102     Antiarrhythmics  amiodarone tablet 100 mg, Daily, Oral  metoprolol succinate (TOPROL-XL) 24 hr tablet 50 mg, Daily, Oral    Anticoagulants  apixaban tablet 5 mg, 2 times daily, Oral

## 2025-05-09 NOTE — PT/OT/SLP PROGRESS
Occupational Therapy   Treatment    Name: Iris Mueller  MRN: [id number]  Admitting Diagnosis:  EJFF (acute kidney injury)       Recommendations:     Discharge Recommendations: Low Intensity Therapy  Discharge Equipment Recommendations:  none  Barriers to discharge:  None    Assessment:     Iris Mueller is a 69 y.o. female with a medical diagnosis of JEFF (acute kidney injury).  She presents with independent and knows her limits, motivated and safe, requesting to void BM on toilet. Performance deficits affecting function are weakness, impaired endurance, pain, decreased lower extremity function, impaired self care skills, impaired functional mobility, gait instability, impaired balance, impaired cardiopulmonary response to activity.     Rehab Prognosis:  Good; patient would benefit from acute skilled OT services to address these deficits and reach maximum level of function.       Plan:     Patient to be seen 5 x/week to address the above listed problems via self-care/home management, therapeutic activities, therapeutic exercises  Plan of Care Expires: 06/07/25  Plan of Care Reviewed with: patient    Subjective     Chief Complaint: sciatic pain (chronic)  Patient/Family Comments/goals: recover quickly  Pain/Comfort:  Pain Rating 1: other (see comments) (unrated and intermittent)  Location - Side 1: Left  Location - Orientation 1: posterior  Location 1: hip (sciatic)  Pain Addressed 1: Reposition, Pre-medicate for activity, Distraction, Cessation of Activity  Pain Rating Post-Intervention 1: other (see comments) (same)    Objective:     Communicated with: nurseAby prior to session.  Patient found up in chair with chair check, telemetry, peripheral IV upon OT entry to room.    General Precautions: Standard, fall, respiratory    Orthopedic Precautions:N/A  Braces: N/A  Respiratory Status: Room air     Occupational Performance:     Bed Mobility:    Patient completed Bridging with moderate  assistance and 2 persons 2° LLE unable to assist.  Patient completed Sit to Supine with modified independence, with side rail, and increased time  Patient scooted out to edge of chair independently prior to stand.    Functional Mobility/Transfers:  Patient completed Sit <> Stand Transfer with Stand by assistance for IV pole mgmnt  with  rolling walker   Patient completed Toilet Transfer Step Transfer technique with stand by assistance with IV pole mgmnt with  rolling walker and grab bars  Functional Mobility: in room ambulation with RW and SBA for IV pole mgmnt 2 x 15 ft. No LOB. Good safety awareness.    Activities of Daily Living:  Grooming: independence while seated at sink to complete oral hygiene, hand hygiene, and UB bathing.  Toileting: modified independence at toilet with grab bars.    Treatment & Education:  -ROLAND provided positive reinforcement of pt's intact awareness to deficits and safety.  -education provided on wide toe shoe purchasing online.    Patient left HOB elevated with all lines intact, call button in reach, bed alarm on, and nurseAby notified    GOALS:   Multidisciplinary Problems       Occupational Therapy Goals          Problem: Occupational Therapy    Goal Priority Disciplines Outcome Interventions   Occupational Therapy Goal     OT, PT/OT Progressing    Description: Goals to be met by: 6/7/2025     Patient will increase functional independence with ADLs by performing:    UE Dressing with Modified [place].  LE Dressing with Modified [place].  Grooming while standing at sink with Modified [place].  Toileting from toilet with Modified [place] for hygiene and clothing management.   Supine to sit with Modified [place].  Step transfer with Modified [place]  Toilet transfer to toilet with Modified [place].                       Time Tracking:     OT Date of Treatment: 05/08/25  OT Start Time: 1105  OT Stop Time: 1146  OT Total Time (min): 41 min    Billable  Minutes:Self Care/Home Management 41 min    OT/EMI: EMI     Number of EMI visits since last OT visit: 1    5/8/2025

## 2025-05-09 NOTE — ASSESSMENT & PLAN NOTE
Patient's blood pressure range in the last 24 hours was: BP  Min: 119/86  Max: 161/91.The patient's inpatient anti-hypertensive regimen is listed below:  Current Antihypertensives  , Daily, Oral  amLODIPine tablet 2.5 mg, Daily, Oral  metoprolol succinate (TOPROL-XL) 24 hr tablet 50 mg, Daily, Oral    Plan  - BP is controlled, no changes needed to their regimen

## 2025-05-09 NOTE — PLAN OF CARE
Problem: Adult Inpatient Plan of Care  Goal: Plan of Care Review  Outcome: Progressing  Goal: Patient-Specific Goal (Individualized)  Outcome: Progressing  Goal: Absence of Hospital-Acquired Illness or Injury  Outcome: Progressing  Goal: Optimal Comfort and Wellbeing  Outcome: Progressing  Goal: Readiness for Transition of Care  Outcome: Progressing     Problem: Acute Kidney Injury/Impairment  Goal: Fluid and Electrolyte Balance  Outcome: Progressing  Goal: Improved Oral Intake  Outcome: Progressing  Goal: Effective Renal Function  Outcome: Progressing     Problem: Wound  Goal: Optimal Coping  Outcome: Progressing  Goal: Optimal Functional Ability  Outcome: Progressing  Goal: Absence of Infection Signs and Symptoms  Outcome: Progressing  Goal: Improved Oral Intake  Outcome: Progressing  Goal: Optimal Pain Control and Function  Outcome: Progressing  Goal: Skin Health and Integrity  Outcome: Progressing  Goal: Optimal Wound Healing  Outcome: Progressing     Problem: Skin Injury Risk Increased  Goal: Skin Health and Integrity  Outcome: Progressing     Problem: Fall Injury Risk  Goal: Absence of Fall and Fall-Related Injury  Outcome: Progressing

## 2025-05-09 NOTE — ASSESSMENT & PLAN NOTE
Creatine stable for now. BMP reviewed- noted Estimated Creatinine Clearance: 29.7 mL/min (A) (based on SCr of 1.8 mg/dL (H)). according to latest data. Based on current GFR, CKD stage is stage 4 - GFR 15-29.  Monitor UOP and serial BMP and adjust therapy as needed. Renally dose meds. Avoid nephrotoxic medications and procedures.   no

## 2025-05-09 NOTE — PROGRESS NOTES
INPATIENT NEPHROLOGY Progress Note   [facility]    Patient Name: Iris Mueller  Date: 05/09/2025    Reason for consultation:    Chief Complaint:   Chief Complaint   Patient presents with    Fatigue    Fall     3 times today, hematoma above right eye    Weakness       History of Present Illness:  69 year old pt getting admitted with recurrent falls /JEFF. Pt also c/o back pain but she said its been there since she had back surgery long time back. Because of the falls she came to ER and found to have JEFF and got admitted.She was sleepy and was little confused upon presentation. Her condition improved with iv fluids and she was no longer confused. Currently in observation. Consulted for JEFF.    Interval History:  5/7- hypotensive at admission, on 2L NC, SCr 2.2 in April, up to 3.1 today- on NS 100cc/hr, UA bland,  (628), trop flat, lactic acid normal, CXR clear, CT head/C spine neg  5/8-  VSS.  800 ml UOP recorded for one shift.  Renal function improved.  No complaints.  5/9- BP improved, on RA, UOP not recorded, renal function improved    Plan of Care:    Assessment:  JEFF/CKD III  Baseline severe AS with CHF  Baseline HTN  Baseline AF  Hyponatremia  SHPT  Anemia of CKD    Plan:    - suspect JEFF from hypotension/volume depletion- stop IVFs- hold bumex, aldactone- plan to resume bumex at discharge and hold aldactone I think  - compensated on exam right now- 2g salt, 1.5-2L fluid restriction  - defer metoprolol, norvasc to HM  - on amio, eliquis  - serum Na improved with above management  - no active BMM issues  - iron def- add iron supp daily    Thank you for allowing us to participate in this patient's care. We will continue to follow.    Vital Signs:  Temp Readings from Last 3 Encounters:   05/09/25 99.5 °F (37.5 °C) (Oral)   01/28/25 98.2 °F (36.8 °C) (Oral)   12/17/24 97.6 °F (36.4 °C) (Oral)       Pulse Readings from Last 3 Encounters:   05/09/25 86   03/06/25 79   02/20/25 69  "      BP Readings from Last 3 Encounters:   05/09/25 (!) 161/91   04/18/25 115/70   03/06/25 130/70       Weight:  Wt Readings from Last 3 Encounters:   05/07/25 85.5 kg (188 lb 7.9 oz)   04/18/25 89.4 kg (197 lb 1.6 oz)   03/06/25 98.9 kg (218 lb)       INS/OUTS:  I/O last 3 completed shifts:  In: 4215.2 [P.O.:480; I.V.:3735.2]  Out: -   No intake/output data recorded.    Medications:  Scheduled Meds:   amiodarone  100 mg Oral Daily    apixaban  5 mg Oral BID    budesonide  1 mg Nebulization Q12H    And    arformoteroL  15 mcg Nebulization BID    ipratropium  0.5 mg Nebulization Q6H    pantoprazole  40 mg Oral Before breakfast     Continuous Infusions:   0.9% NaCl   Intravenous Continuous 100 mL/hr at 05/09/25 0902 New Bag at 05/09/25 0902     PRN Meds:.  Current Facility-Administered Medications:     acetaminophen, 650 mg, Oral, Q4H PRN    aluminum-magnesium hydroxide-simethicone, 30 mL, Oral, QID PRN    HYDROcodone-acetaminophen, 1 tablet, Oral, Q6H PRN    magnesium oxide, 800 mg, Oral, PRN    magnesium oxide, 800 mg, Oral, PRN    melatonin, 6 mg, Oral, Nightly PRN    naloxone, 0.02 mg, Intravenous, PRN    ondansetron, 4 mg, Intravenous, Q6H PRN    potassium bicarbonate, 35 mEq, Oral, PRN    potassium bicarbonate, 50 mEq, Oral, PRN    potassium bicarbonate, 60 mEq, Oral, PRN    potassium, sodium phosphates, 2 packet, Oral, PRN    potassium, sodium phosphates, 2 packet, Oral, PRN    potassium, sodium phosphates, 2 packet, Oral, PRN  Medications Ordered Prior to Encounter[1]    Review of Systems:  Neg    Physical Exam:  BP (!) 161/91   Pulse 86   Temp 99.5 °F (37.5 °C) (Oral)   Resp 18   Ht 5' 3" (1.6 m)   Wt 85.5 kg (188 lb 7.9 oz)   SpO2 (!) 93%   BMI 33.39 kg/m²     General Appearance:    NAD, AAO x 3, cooperative, appears stated age   Head:    Normocephalic, atraumatic   Eyes:    PER, EOMI, and conjunctiva/sclera clear bilaterally        Mouth:   Moist mucus membranes, no thrush or oral lesions, normal   "    dentition   Back:     No CVA tenderness   Lungs:     Clear to auscultation bilaterally, no wheeze, crackles, rales      or rhonchi, symmetric air movement, respirations unlabored   Heart:    Regular rate and rhythm, S1 and S2 normal, no murmur, rub   or gallop   Abdomen:     Soft, non-tender, non-distended, bowel sounds active all four   quadrants, no RT or guarding, no masses, no organomegaly   Extremities:   Warm and well perfused, distal pulses intact, no cyanosis or    peripheral edema   MSK:   No joint or muscle swelling, tenderness or deformity   Skin:   Skin color, texture, turgor normal, no rashes or lesions   Neurologic/Psychiatric:   CNII-XII intact, normal strength and sensation       throughout, no asterixis; normal affect, memory, judgement     and insight     Results:  Lab Results   Component Value Date     (L) 05/09/2025    K 4.1 05/09/2025     05/09/2025    CO2 22 (L) 05/09/2025    BUN 27 (H) 05/09/2025    CREATININE 1.8 (H) 05/09/2025    CALCIUM 8.9 05/09/2025    ANIONGAP 8 05/09/2025    ESTGFRAFRICA 52 (L) 04/25/2022    EGFRNONAA 45 (L) 04/25/2022       Lab Results   Component Value Date    CALCIUM 8.9 05/09/2025    PHOS 3.9 01/28/2025       Recent Labs   Lab 05/09/25  0558   WBC 7.72   RBC 3.57*   HGB 9.5*   HCT 32.4*      MCV 91   MCH 26.6*   MCHC 29.3*       I have personally reviewed pertinent radiological imaging and reports from this admission.    I have spent > 35 minutes providing care for this patient for the above diagnoses. These services have included chart/data/imaging review, evaluation, exam, formulation of plan, , note preparation, and discussions with staff involved in this patient's care.    Brett Deng MD    [facility]  [address] [place] [id number]  [phone] (p)  [phone] (f)         [1]   Current Facility-Administered Medications on File Prior to Encounter   Medication Dose Route Frequency Provider Last Rate  Last Admin    lidocaine (PF) 10 mg/ml (1%) injection 5 mg  0.5 mL Intradermal Once Azeem Anderson MD         Current Outpatient Medications on File Prior to Encounter   Medication Sig Dispense Refill    furosemide (LASIX) 40 MG tablet Take 40 mg by mouth once daily.      albuterol-ipratropium (DUO-NEB) 2.5 mg-0.5 mg/3 mL nebulizer solution Take 3 mLs by nebulization every 4 (four) hours as needed for Wheezing or Shortness of Breath. Rescue 75 mL 0    amiodarone (PACERONE) 200 MG Tab Take 0.5 tablets (100 mg total) by mouth once daily. 90 tablet 3    amLODIPine (NORVASC) 2.5 MG tablet Take 1 tablet (2.5 mg total) by mouth every evening. 90 tablet 3    apixaban (ELIQUIS) 5 mg Tab Take 1 tablet (5 mg total) by mouth 2 (two) times daily. 60 tablet 11    aspirin (ECOTRIN) 81 MG EC tablet Take 1 tablet (81 mg total) by mouth once daily. 30 tablet 0    atorvastatin (LIPITOR) 10 MG tablet Take 1 tablet (10 mg total) by mouth every evening. 90 tablet 3    budesonide-glycopyr-formoterol (BREZTRI AEROSPHERE) 160-9-4.8 mcg/actuation HFAA Inhale 2 puffs into the lungs 2 (two) times a day.      bumetanide (BUMEX) 1 MG tablet Take 1 tablet (1 mg total) by mouth once daily. (Patient taking differently: Take 1 mg by mouth 2 (two) times a day.) 90 tablet 4    buPROPion (WELLBUTRIN SR) 150 MG TBSR 12 hr tablet Take 1 tablet (150 mg total) by mouth 2 (two) times daily. 180 tablet 3    FLUoxetine 40 MG capsule Take 1 capsule (40 mg total) by mouth once daily. 90 capsule 3    gabapentin (NEURONTIN) 300 MG capsule Take 1 capsule (300 mg total) by mouth nightly as needed (pain). (Patient taking differently: Take 300 mg by mouth once daily.) 30 capsule 2    iron polysaccharides (NIFEREX) 150 mg iron Cap Take 1 capsule (150 mg total) by mouth once daily. 90 capsule 0    metoprolol succinate (TOPROL-XL) 50 MG 24 hr tablet Take 1 tablet (50 mg total) by mouth 2 (two) times daily. 180 tablet 3    mirtazapine (REMERON) 15 MG tablet Take 1  tablet (15 mg total) by mouth every evening. For sleep 30 tablet 3    mupirocin (BACTROBAN) 2 % ointment Apply topically 2 (two) times daily. To infected skin tear 22 g 1    potassium chloride SA (K-DUR,KLOR-CON M) 10 MEQ tablet Take 1 tablet (10 mEq total) by mouth once daily. 30 tablet 5    spironolactone (ALDACTONE) 25 MG tablet Take 1 tablet (25 mg total) by mouth once daily. 90 tablet 3    triamcinolone acetonide 0.1% (KENALOG) 0.1 % cream Apply topically 2 (two) times daily. 60 g 2    warfarin (COUMADIN) 2.5 MG tablet Take 1-1.5 Tablets QPM as instructed by coumadin clinic 40 tablet 3

## 2025-05-09 NOTE — PLAN OF CARE
KINGA sent patient information to [facility] home health via [employer].  [facility] will need HH orders upon discharge.       05/09/25 1500   Post-Acute Status   Post-Acute Authorization [facility]   Home Health Status Referrals Sent

## 2025-05-09 NOTE — PROGRESS NOTES
[facility] Medicine  Progress Note    Patient Name: Iris Mueller  MRN: [id number]  Patient Class: IP- Inpatient   Admission Date: 5/6/2025  Length of Stay: 2 days  Attending Physician: Junie Frias MD  Primary Care Provider: Elkin You MD        Subjective     Principal Problem:JEFF (acute kidney injury)        HPI:  69 year old pt getting admitted with recurrent falls /JEFF  Pt also c/o back pain but she said its been there since she had back surgery long time back  Because of the falls she came to ER and found to have JEFF and got admitted  She was sleepy and was little confused upon presentation  Her condition improved with iv fluids and she was no longer confused when saw in ER  Pt denied any other issues or c/o    Overview/Hospital Course:  The patient was admitted to the hospital medicine service and closely monitored for her recurrent falls and acute kidney injury.  The patient was maintained on IV fluids.  Nephrology was consulted.  Nephrotoxic medications were held.  Nephrology was consulted.  Imaging of head neck and shoulder were negative for acute findings.  PT and OT were consulted.    Interval History:   Denies any complaints at present.  Creatinine 1.8 this a.m.    Review of Systems   Constitutional:  Positive for activity change. Negative for appetite change, chills, diaphoresis and fever.   Respiratory:  Negative for shortness of breath.    Cardiovascular:  Negative for chest pain.   Gastrointestinal:  Negative for abdominal pain, anal bleeding, diarrhea, nausea and vomiting.     Objective:     Vital Signs (Most Recent):  Temp: 99.5 °F (37.5 °C) (05/09/25 0728)  Pulse: 86 (05/09/25 0747)  Resp: 18 (05/09/25 1038)  BP: (!) 161/91 (05/09/25 0728)  SpO2: (!) 93 % (05/09/25 0747) Vital Signs (24h Range):  Temp:  [98 °F (36.7 °C)-99.5 °F (37.5 °C)] 99.5 °F (37.5 °C)  Pulse:  [] 86  Resp:  [16-19] 18  SpO2:  [87 %-93 %] 93 %  BP: (119-161)/(82-92) 161/91      Weight: 85.5 kg (188 lb 7.9 oz)  Body mass index is 33.39 kg/m².    Intake/Output Summary (Last 24 hours) at 5/9/2025 1057  Last data filed at 5/9/2025 0521  Gross per 24 hour   Intake 1230.02 ml   Output --   Net 1230.02 ml         Physical Exam  Constitutional:       Appearance: Normal appearance.   HENT:      Head: Normocephalic.      Mouth/Throat:      Mouth: Mucous membranes are moist.      Pharynx: Oropharynx is clear.   Eyes:      Extraocular Movements: Extraocular movements intact.   Cardiovascular:      Rate and Rhythm: Normal rate and regular rhythm.      Pulses: Normal pulses.      Heart sounds: Normal heart sounds.   Pulmonary:      Effort: Pulmonary effort is normal.      Breath sounds: Normal breath sounds.   Abdominal:      General: Bowel sounds are normal. There is no distension.      Palpations: Abdomen is soft.      Tenderness: There is no abdominal tenderness. There is no guarding.   Skin:     Capillary Refill: Capillary refill takes less than 2 seconds.   Neurological:      Mental Status: She is alert and oriented to person, place, and time.   Psychiatric:         Mood and Affect: Mood normal.               Significant Labs: All pertinent labs within the past 24 hours have been reviewed.    Significant Imaging: I have reviewed all pertinent imaging results/findings within the past 24 hours.      Assessment & Plan  JEFF (acute kidney injury)  Maintain iv fluids  Stop all nephrotoxins    Latest Reference Range & Units 01/27/25 06:21 01/28/25 06:48 04/22/25 01:17 05/06/25 19:12   Creatinine 0.5 - 1.4 mg/dL 1.6 (H) 1.6 (H) 2.2 (H) 3.1 (H)   (H): Data is abnormally high  Recurrent falls  PT/OT  CT brain/CT Cervical spine WNL    CKD (chronic kidney disease) stage 4, GFR 15-29 ml/min  Creatine stable for now. BMP reviewed- noted Estimated Creatinine Clearance: 29.7 mL/min (A) (based on SCr of 1.8 mg/dL (H)). according to latest data. Based on current GFR, CKD stage is stage 4 - GFR 15-29.  Monitor  UOP and serial BMP and adjust therapy as needed. Renally dose meds. Avoid nephrotoxic medications and procedures.  Chronic obstructive pulmonary disease  Patient's COPD is controlled currently.  Patient is currently off COPD Pathway. Continue scheduled inhalers Nebs, Steroids, and Supplemental oxygen and monitor respiratory status closely.   Chronic combined systolic and diastolic heart failure  Stable  Hold Torsemide and aldactone at the moment in view with JEFF  Doesn't look hypervolemic at the moment     Coronary artery disease involving native coronary artery of native heart without angina pectoris  Stable   Obesity  Body mass index is 33.39 kg/m². Morbid obesity complicates all aspects of disease management from diagnostic modalities to treatment.  Atrial fibrillation  [id number] Score: 3. The patients heart rate in the last 24 hours is as follows:  Pulse  Min: 82  Max: 102     Antiarrhythmics  amiodarone tablet 100 mg, Daily, Oral  metoprolol succinate (TOPROL-XL) 24 hr tablet 50 mg, Daily, Oral    Anticoagulants  apixaban tablet 5 mg, 2 times daily, Oral    Hypertension  Patient's blood pressure range in the last 24 hours was: BP  Min: 119/86  Max: 161/91.The patient's inpatient anti-hypertensive regimen is listed below:  Current Antihypertensives  , Daily, Oral  amLODIPine tablet 2.5 mg, Daily, Oral  metoprolol succinate (TOPROL-XL) 24 hr tablet 50 mg, Daily, Oral    Plan  - BP is controlled, no changes needed to their regimen    VTE Risk Mitigation (From admission, onward)           Ordered     apixaban tablet 5 mg  2 times daily         05/06/25 2151     IP VTE HIGH RISK PATIENT  Once         05/06/25 2151     Place sequential compression device  Until discontinued         05/06/25 2151                    Discharge Planning   VALERIANO: 5/10/2025     Code Status: Full Code   Medical Readiness for Discharge Date:   Discharge Plan A: Home Health                Please place Justification for EMELINA Howard  MD Rodriguez, MD  Department of Hospital Medicine   Critical access hospital

## 2025-05-09 NOTE — CARE UPDATE
05/09/25 0732   Patient Assessment/Suction   Level of Consciousness (AVPU) alert   Respiratory Effort Normal   ELIAS Breath Sounds diminished;wheezes, expiratory   LLL Breath Sounds diminished   RUL Breath Sounds diminished;wheezes, expiratory   RML Breath Sounds diminished;wheezes, expiratory   RLL Breath Sounds diminished   PRE-TX-O2   Device (Oxygen Therapy) room air;nasal cannula  (pt. had 02 off)   $ Is the patient on Low Flow Oxygen? Yes   Flow (L/min) (Oxygen Therapy) 2   SpO2 (!) 93 %   Pulse Oximetry Type Intermittent   $ Pulse Oximetry - Multiple Charge Pulse Oximetry - Multiple   Pulse 89   Resp 18   Positioning HOB elevated 30 degrees   Aerosol Therapy   $ Aerosol Therapy Charges Aerosol Treatment   Respiratory Treatment Status (SVN) given   Treatment Route (SVN) mask   Patient Position HOB elevated   Signs of Intolerance (SVN) none   Breath Sounds Post-Respiratory Treatment   Throughout All Fields Post-Treatment All Fields   Throughout All Fields Post-Treatment no change   Post-treatment Heart Rate (beats/min) 82   Post-treatment Resp Rate (breaths/min) 18

## 2025-05-09 NOTE — PT/OT/SLP PROGRESS
Physical Therapy      Patient Name:  Iris Mueller   MRN:  [id number]    Patient not seen today secondary to Pain (x 2 attempts). Will follow-up 5/10/25.

## 2025-05-09 NOTE — CARE UPDATE
05/08/25 1905   Patient Assessment/Suction   Level of Consciousness (AVPU) alert   Respiratory Effort Normal;Unlabored   Expansion/Accessory Muscles/Retractions no use of accessory muscles   All Lung Fields Breath Sounds equal bilaterally;diminished;clear   Rhythm/Pattern, Respiratory unlabored   Cough Frequency infrequent   Skin Integrity   $ Wound Care Tech Time 15 min   Area Observed Left;Right;Behind ear   Skin Appearance without discoloration   PRE-TX-O2   Device (Oxygen Therapy) room air   Flow (L/min) (Oxygen Therapy) 2  (2L NC on S/B)   SpO2 (!) 92 %   Pulse Oximetry Type Intermittent   $ Pulse Oximetry - Multiple Charge Pulse Oximetry - Multiple   Pulse 85   Resp 18   Aerosol Therapy   $ Aerosol Therapy Charges Aerosol Treatment  (Atrovent)   Daily Review of Necessity (SVN) completed   Respiratory Treatment Status (SVN) given   Treatment Route (SVN) mask   Patient Position HOB elevated   Post Treatment Assessment (SVN) breath sounds unchanged   Signs of Intolerance (SVN) none   Breath Sounds Post-Respiratory Treatment   Throughout All Fields Post-Treatment All Fields   Throughout All Fields Post-Treatment no change   Post-treatment Heart Rate (beats/min) 85   Post-treatment Resp Rate (breaths/min) 18   Education   $ Education Bronchodilator;Oxygen;15 min   Respiratory Evaluation   $ Care Plan Tech Time 15 min

## 2025-05-09 NOTE — PT/OT/SLP PROGRESS
"Occupational Therapy   Treatment    Name: Iris Mueller  MRN: [id number]  Admitting Diagnosis:  JEFF (acute kidney injury)       Recommendations:     Discharge Recommendations: Low Intensity Therapy  Discharge Equipment Recommendations:  none  Barriers to discharge:       Assessment:     Iris Mueller is a 69 y.o. female with a medical diagnosis of JEFF (acute kidney injury).  Pt agreeable to OT therapy session this AM. Performance deficits affecting function are weakness, impaired endurance, impaired self care skills, impaired functional mobility, gait instability, impaired balance, decreased safety awareness, decreased lower extremity function, decreased upper extremity function, pain, impaired cardiopulmonary response to activity. Pt impulsive and requires frequent cues for safety.    Rehab Prognosis:  Good; patient would benefit from acute skilled OT services to address these deficits and reach maximum level of function.       Plan:     Patient to be seen 5 x/week to address the above listed problems via self-care/home management, therapeutic exercises, therapeutic activities  Plan of Care Expires: 06/07/25  Plan of Care Reviewed with: patient    Subjective     Chief Complaint: "I need to use the bathroom"  Patient/Family Comments/goals: none stated  Pain/Comfort:  Pain Rating 1:  (not rated)  Location - Side 1: Left  Location 1: leg  Pain Addressed 1: Reposition, Distraction, Cessation of Activity    Objective:     Communicated with: nursing prior to session.  Patient found HOB elevated with bed alarm, telemetry, peripheral IV upon OT entry to room.    General Precautions: Standard, fall    Orthopedic Precautions:N/A  Braces: N/A  Respiratory Status: Room air     Occupational Performance:     Bed Mobility:    Patient completed Supine to Sit with stand by assistance     Functional Mobility/Transfers:  Patient completed Sit <> Stand Transfer with stand by assistance  with  rolling walker "   Patient completed Toilet Transfer Step Transfer technique with stand by assistance with  rolling walker and grab bars  Functional Mobility: pt amb in room with SBA/CGA with RW with no LOB or SOB    Activities of Daily Living:  Grooming: stand by assistance standing at sink to wash hands  Toileting: stand by assistance for hygiene and clothing management     Treatment & Education:  Pt educated on role of OT/POC, importance of OOB/EOB activity, use of call bell, and safety during ADLs, transfers, and functional mobility.    Patient left up in chair with all lines intact, call button in reach, and chair alarm on    GOALS:   Multidisciplinary Problems       Occupational Therapy Goals          Problem: Occupational Therapy    Goal Priority Disciplines Outcome Interventions   Occupational Therapy Goal     OT, PT/OT Progressing    Description: Goals to be met by: 6/7/2025     Patient will increase functional independence with ADLs by performing:    UE Dressing with Modified [place].  LE Dressing with Modified [place].  Grooming while standing at sink with Modified [place].  Toileting from toilet with Modified [place] for hygiene and clothing management.   Supine to sit with Modified [place].  Step transfer with Modified [place]  Toilet transfer to toilet with Modified [place].                         Time Tracking:     OT Date of Treatment: 05/09/25  OT Start Time: 0951  OT Stop Time: 1011  OT Total Time (min): 20 min    Billable Minutes:Self Care/Home Management 20    OT/EMI: OT       5/9/2025

## 2025-05-09 NOTE — SUBJECTIVE & OBJECTIVE
Interval History:   Denies any complaints at present.  Creatinine 1.8 this a.m.    Review of Systems   Constitutional:  Positive for activity change. Negative for appetite change, chills, diaphoresis and fever.   Respiratory:  Negative for shortness of breath.    Cardiovascular:  Negative for chest pain.   Gastrointestinal:  Negative for abdominal pain, anal bleeding, diarrhea, nausea and vomiting.     Objective:     Vital Signs (Most Recent):  Temp: 99.5 °F (37.5 °C) (05/09/25 0728)  Pulse: 86 (05/09/25 0747)  Resp: 18 (05/09/25 1038)  BP: (!) 161/91 (05/09/25 0728)  SpO2: (!) 93 % (05/09/25 0747) Vital Signs (24h Range):  Temp:  [98 °F (36.7 °C)-99.5 °F (37.5 °C)] 99.5 °F (37.5 °C)  Pulse:  [] 86  Resp:  [16-19] 18  SpO2:  [87 %-93 %] 93 %  BP: (119-161)/(82-92) 161/91     Weight: 85.5 kg (188 lb 7.9 oz)  Body mass index is 33.39 kg/m².    Intake/Output Summary (Last 24 hours) at 5/9/2025 1057  Last data filed at 5/9/2025 0521  Gross per 24 hour   Intake 1230.02 ml   Output --   Net 1230.02 ml         Physical Exam  Constitutional:       Appearance: Normal appearance.   HENT:      Head: Normocephalic.      Mouth/Throat:      Mouth: Mucous membranes are moist.      Pharynx: Oropharynx is clear.   Eyes:      Extraocular Movements: Extraocular movements intact.   Cardiovascular:      Rate and Rhythm: Normal rate and regular rhythm.      Pulses: Normal pulses.      Heart sounds: Normal heart sounds.   Pulmonary:      Effort: Pulmonary effort is normal.      Breath sounds: Normal breath sounds.   Abdominal:      General: Bowel sounds are normal. There is no distension.      Palpations: Abdomen is soft.      Tenderness: There is no abdominal tenderness. There is no guarding.   Skin:     Capillary Refill: Capillary refill takes less than 2 seconds.   Neurological:      Mental Status: She is alert and oriented to person, place, and time.   Psychiatric:         Mood and Affect: Mood normal.               Significant  Labs: All pertinent labs within the past 24 hours have been reviewed.    Significant Imaging: I have reviewed all pertinent imaging results/findings within the past 24 hours.

## 2025-05-10 VITALS
DIASTOLIC BLOOD PRESSURE: 121 MMHG | BODY MASS INDEX: 33.4 KG/M2 | TEMPERATURE: 98 F | HEIGHT: 63 IN | SYSTOLIC BLOOD PRESSURE: 160 MMHG | HEART RATE: 82 BPM | WEIGHT: 188.5 LBS | RESPIRATION RATE: 18 BRPM | OXYGEN SATURATION: 96 %

## 2025-05-10 LAB
ABSOLUTE EOSINOPHIL (SMH): 0.21 K/UL
ABSOLUTE MONOCYTE (SMH): 0.66 K/UL (ref 0.3–1)
ABSOLUTE NEUTROPHIL COUNT (SMH): 5.6 K/UL (ref 1.8–7.7)
ALBUMIN SERPL-MCNC: 3.5 G/DL (ref 3.5–5.2)
ALP SERPL-CCNC: 166 UNIT/L (ref 55–135)
ALT SERPL-CCNC: 32 UNIT/L (ref 10–44)
ANION GAP (SMH): 7 MMOL/L (ref 8–16)
AST SERPL-CCNC: 21 UNIT/L (ref 10–40)
BASOPHILS # BLD AUTO: 0.04 K/UL
BASOPHILS NFR BLD AUTO: 0.5 %
BILIRUB SERPL-MCNC: 0.7 MG/DL (ref 0.1–1)
BUN SERPL-MCNC: 21 MG/DL (ref 8–23)
CALCIUM SERPL-MCNC: 8.9 MG/DL (ref 8.7–10.5)
CHLORIDE SERPL-SCNC: 103 MMOL/L (ref 95–110)
CO2 SERPL-SCNC: 24 MMOL/L (ref 23–29)
CREAT SERPL-MCNC: 1.7 MG/DL (ref 0.5–1.4)
ERYTHROCYTE [DISTWIDTH] IN BLOOD BY AUTOMATED COUNT: 17.3 % (ref 11.5–14.5)
GFR SERPLBLD CREATININE-BSD FMLA CKD-EPI: 32 ML/MIN/1.73/M2
GLUCOSE SERPL-MCNC: 97 MG/DL (ref 70–110)
HCT VFR BLD AUTO: 30.5 % (ref 37–48.5)
HGB BLD-MCNC: 9.1 GM/DL (ref 12–16)
IMM GRANULOCYTES # BLD AUTO: 0.04 K/UL (ref 0–0.04)
IMM GRANULOCYTES NFR BLD AUTO: 0.5 % (ref 0–0.5)
LYMPHOCYTES # BLD AUTO: 0.7 K/UL (ref 1–4.8)
MAGNESIUM SERPL-MCNC: 1.8 MG/DL (ref 1.6–2.6)
MCH RBC QN AUTO: 26.4 PG (ref 27–31)
MCHC RBC AUTO-ENTMCNC: 29.8 G/DL (ref 32–36)
MCV RBC AUTO: 88 FL (ref 82–98)
NUCLEATED RBC (/100WBC) (SMH): 0 /100 WBC
PLATELET # BLD AUTO: 229 K/UL (ref 150–450)
PMV BLD AUTO: 8.6 FL (ref 9.2–12.9)
POTASSIUM SERPL-SCNC: 4.2 MMOL/L (ref 3.5–5.1)
PROT SERPL-MCNC: 6.3 GM/DL (ref 6–8.4)
RBC # BLD AUTO: 3.45 M/UL (ref 4–5.4)
RELATIVE EOSINOPHIL (SMH): 2.9 % (ref 0–8)
RELATIVE LYMPHOCYTE (SMH): 9.6 % (ref 18–48)
RELATIVE MONOCYTE (SMH): 9.1 % (ref 4–15)
RELATIVE NEUTROPHIL (SMH): 77.4 % (ref 38–73)
SODIUM SERPL-SCNC: 134 MMOL/L (ref 136–145)
WBC # BLD AUTO: 7.29 K/UL (ref 3.9–12.7)

## 2025-05-10 PROCEDURE — 85025 COMPLETE CBC W/AUTO DIFF WBC: CPT | Performed by: INTERNAL MEDICINE

## 2025-05-10 PROCEDURE — 99900035 HC TECH TIME PER 15 MIN (STAT)

## 2025-05-10 PROCEDURE — 25000003 PHARM REV CODE 250: Performed by: INTERNAL MEDICINE

## 2025-05-10 PROCEDURE — 99900031 HC PATIENT EDUCATION (STAT)

## 2025-05-10 PROCEDURE — 36415 COLL VENOUS BLD VENIPUNCTURE: CPT | Performed by: INTERNAL MEDICINE

## 2025-05-10 PROCEDURE — 25000242 PHARM REV CODE 250 ALT 637 W/ HCPCS: Performed by: INTERNAL MEDICINE

## 2025-05-10 PROCEDURE — 83735 ASSAY OF MAGNESIUM: CPT | Performed by: INTERNAL MEDICINE

## 2025-05-10 PROCEDURE — 94640 AIRWAY INHALATION TREATMENT: CPT

## 2025-05-10 PROCEDURE — 80053 COMPREHEN METABOLIC PANEL: CPT | Performed by: INTERNAL MEDICINE

## 2025-05-10 PROCEDURE — 94761 N-INVAS EAR/PLS OXIMETRY MLT: CPT

## 2025-05-10 RX ORDER — BUMETANIDE 1 MG/1
1 TABLET ORAL DAILY
Status: DISCONTINUED | OUTPATIENT
Start: 2025-05-10 | End: 2025-05-10 | Stop reason: HOSPADM

## 2025-05-10 RX ADMIN — ARFORMOTEROL TARTRATE 15 MCG: 15 SOLUTION RESPIRATORY (INHALATION) at 07:05

## 2025-05-10 RX ADMIN — BUDESONIDE INHALATION 1 MG: 0.5 SUSPENSION RESPIRATORY (INHALATION) at 07:05

## 2025-05-10 RX ADMIN — METOPROLOL SUCCINATE 50 MG: 50 TABLET, EXTENDED RELEASE ORAL at 09:05

## 2025-05-10 RX ADMIN — PANTOPRAZOLE SODIUM 40 MG: 40 TABLET, DELAYED RELEASE ORAL at 05:05

## 2025-05-10 RX ADMIN — BUMETANIDE 1 MG: 1 TABLET ORAL at 12:05

## 2025-05-10 RX ADMIN — AMLODIPINE BESYLATE 2.5 MG: 2.5 TABLET ORAL at 09:05

## 2025-05-10 RX ADMIN — FERROUS SULFATE TAB EC 325 MG (65 MG FE EQUIVALENT) 1 EACH: 325 (65 FE) TABLET DELAYED RESPONSE at 09:05

## 2025-05-10 RX ADMIN — HYDROCODONE BITARTRATE AND ACETAMINOPHEN 1 TABLET: 5; 325 TABLET ORAL at 05:05

## 2025-05-10 RX ADMIN — AMIODARONE HYDROCHLORIDE 100 MG: 100 TABLET ORAL at 09:05

## 2025-05-10 RX ADMIN — HYDROCODONE BITARTRATE AND ACETAMINOPHEN 1 TABLET: 5; 325 TABLET ORAL at 12:05

## 2025-05-10 RX ADMIN — IPRATROPIUM BROMIDE 0.5 MG: 0.5 SOLUTION RESPIRATORY (INHALATION) at 07:05

## 2025-05-10 RX ADMIN — APIXABAN 5 MG: 5 TABLET, FILM COATED ORAL at 09:05

## 2025-05-10 NOTE — CARE UPDATE
05/10/25 0713   Patient Assessment/Suction   Level of Consciousness (AVPU) alert   Respiratory Effort Unlabored   Expansion/Accessory Muscles/Retractions no use of accessory muscles   All Lung Fields Breath Sounds clear   Rhythm/Pattern, Respiratory unlabored   Cough Frequency no cough   PRE-TX-O2   Device (Oxygen Therapy) room air   SpO2 (!) 92 %   Pulse Oximetry Type Intermittent   $ Pulse Oximetry - Multiple Charge Pulse Oximetry - Multiple   Pulse 92   Resp 18   Aerosol Therapy   $ Aerosol Therapy Charges Aerosol Treatment   Daily Review of Necessity (SVN) completed   Respiratory Treatment Status (SVN) given   Treatment Route (SVN) mask;oxygen   Patient Position sitting in chair   Post Treatment Assessment (SVN) breath sounds unchanged   Signs of Intolerance (SVN) none   Breath Sounds Post-Respiratory Treatment   Throughout All Fields Post-Treatment All Fields   Throughout All Fields Post-Treatment no change   Post-treatment Heart Rate (beats/min) 95   Post-treatment Resp Rate (breaths/min) 18   Education   $ Education Bronchodilator;30 min   Respiratory Evaluation   $ Care Plan Tech Time 15 min

## 2025-05-10 NOTE — DISCHARGE INSTRUCTIONS
Per Dr. Deng:  Take Bumex 1mg daily  No aldacotne  Get labs done (BMP) next week  Call office if feels overloaded

## 2025-05-10 NOTE — CARE UPDATE
05/09/25 1916   Patient Assessment/Suction   Level of Consciousness (AVPU) alert   Respiratory Effort Normal;Unlabored   Expansion/Accessory Muscles/Retractions no use of accessory muscles   All Lung Fields Breath Sounds equal bilaterally;diminished;clear   Rhythm/Pattern, Respiratory unlabored   Cough Frequency infrequent   Skin Integrity   $ Wound Care Tech Time 15 min   Area Observed Left;Right;Behind ear   Skin Appearance without discoloration   PRE-TX-O2   Device (Oxygen Therapy) room air  (NC @ 2L on S/B)   SpO2 (!) 92 %   Pulse Oximetry Type Intermittent   $ Pulse Oximetry - Multiple Charge Pulse Oximetry - Multiple   Pulse 95   Resp 18   Aerosol Therapy   $ Aerosol Therapy Charges Aerosol Treatment  (Atrovent)   Daily Review of Necessity (SVN) completed   Respiratory Treatment Status (SVN) given   Treatment Route (SVN) mask   Patient Position HOB elevated   Post Treatment Assessment (SVN) breath sounds unchanged   Signs of Intolerance (SVN) none   Breath Sounds Post-Respiratory Treatment   Throughout All Fields Post-Treatment All Fields   Throughout All Fields Post-Treatment no change   Post-treatment Heart Rate (beats/min) 92   Post-treatment Resp Rate (breaths/min) 18   Education   $ Education Bronchodilator;Oxygen;15 min   Respiratory Evaluation   $ Care Plan Tech Time 15 min

## 2025-05-10 NOTE — PLAN OF CARE
SOC Mon 5/12     05/10/25 1256   Post-Acute Status   Post-Acute Authorization Home Health   Home Health Status Set-up Complete/Auth obtained

## 2025-05-10 NOTE — PLAN OF CARE
Patient cleared for discharge from case management standpoint.    Follow up appointments and suggestions added to AVS.    Chart and discharge orders reviewed.  Patient discharged home with [facility] (soc Mon) & no further case management needs.      05/10/25 1256   Final Note   Assessment Type Final Discharge Note   Anticipated Discharge Disposition [facility] Resources/Appts/Education Provided Provided patient/caregiver with written discharge plan information;Provided education on problems/symptoms using teachback   Post-Acute Status   Post-Acute Authorization Home [facility] Status Set-up Complete/Auth obtained   Discharge Delays None known at this time

## 2025-05-10 NOTE — PT/OT/SLP PROGRESS
Physical Therapy      Patient Name:  Iris Mueller   MRN:  [id number]    Patient not seen today secondary to Pt refused participation due to anticipated discharge. Will follow-up 5/11/25 if discharge does not proceed as planned.

## 2025-05-10 NOTE — DISCHARGE SUMMARY
[facility]  Discharge Summary      Patient Name: Iris Mueller  MRN: [id number]  BONIFACIO: [id number]  Patient Class: IP- Inpatient  Admission Date: 5/6/2025  Hospital Length of Stay: 3 days  Discharge Date and Time: 5/10/2025  3:09 PM  Attending Physician: No att. providers found   Discharging Provider: Ramonita León MD  Primary Care Provider: Elkin You MD    Primary Care Team: Networked reference to record PCT     HPI:   69 year old pt getting admitted with recurrent falls /JEFF  Pt also c/o back pain but she said its been there since she had back surgery long time back  Because of the falls she came to ER and found to have JEFF and got admitted  She was sleepy and was little confused upon presentation  Her condition improved with iv fluids and she was no longer confused when saw in ER  Pt denied any other issues or c/o    * No surgery found *      Hospital Course:   The patient was admitted to the hospital medicine service and closely monitored for her recurrent falls and acute kidney injury.  The patient was maintained on IV fluids.  Nephrology was consulted.  Nephrotoxic medications were held.  Nephrology was consulted.  Imaging of head neck and shoulder were negative for acute findings.  PT and OT were consulted.  Kidney function improved.  Patient was cleared for discharge by Nephrology on Bumex 1 mg daily, no Aldactone.  BNP ordered for next week and patient instructed to call Nephrology office if she feels overloaded.  Patient was seen and examined on day of discharge and deemed suitable for discharge home.  Discharge plans discussed with nephrologist Dr. Deng     Goals of Care Treatment Preferences:  Code Status: Full Code      SDOH Screening:  The patient declined to be screened for utility difficulties, food insecurity, transport difficulties, housing insecurity, and interpersonal safety, so no concerns could be identified this admission.     Consults:    Consults (From admission, onward)          Status Ordering Provider     Inpatient consult to   Once        Provider:  (Not yet assigned)    Completed DAVID MINER     Inpatient consult to Nephrology  Once        Provider:  Brett Deng MD    Completed ASH PAUL            Final Active Diagnoses:    Diagnosis Date Noted POA    PRINCIPAL PROBLEM:  JEFF (acute kidney injury) [N17.9] 05/06/2025 Yes    Recurrent falls [R29.6] 05/06/2025 Not Applicable    Obesity [E66.9] 05/06/2025 Yes    Atrial fibrillation [I48.91] 05/06/2025 Yes    Chronic combined systolic and diastolic heart failure [I50.42] 08/18/2023 Yes    Coronary artery disease involving native coronary artery of native heart without angina pectoris [I25.10] 08/18/2023 Yes    Chronic obstructive pulmonary disease [J44.9] 01/21/2020 Yes    Hypertension [I10]  Yes    CKD (chronic kidney disease) stage 4, GFR 15-29 ml/min [N18.4] 09/13/2019 Yes      Problems Resolved During this Admission:       Discharged Condition: good    Disposition: Home or Self Care    Follow Up:   Contact information for follow-up providers       Elkin You MD Follow up in 1 week(s).    Specialties: Family Medicine, Home Health Services, Hospice Services  Contact information:  [address] [address]  [phone]               Brett Deng MD Follow up in 1 week(s).    Specialty: Nephrology  Contact information:  [address] [facility] [address] [address]  [facility] [address]  [phone]                       Contact information for after-discharge care       Home Medical Care       [facility] .    Service: Home Health Services  Contact information:  [address] [facility] [address]  [phone]                                 Patient Instructions:      Basic metabolic panel   Standing Status: Future Standing Exp. Date: 08/08/26     Order Specific Question Answer Comments   Send normal  result to authorizing provider's In Basket if patient is active on MyChart: Yes      Ambulatory referral/consult to Pain Clinic   Standing Status: Future   Referral Priority: Urgent Referral Type: Consultation   Referral Reason: Specialty Services Required   Requested Specialty: Pain Medicine   Number of Visits Requested: 1     Ambulatory referral/consult to Physical Medicine Rehab   Standing Status: Future   Referral Priority: Urgent Referral Type: Rehabilitation   Referral Reason: Specialty Services Required   Referred to Provider: CARLOS EDUARDO AHUJA Requested Specialty: Physical Medicine and Rehabilitation   Number of Visits Requested: 1     SUBSEQUENT HOME HEALTH ORDERS   Order Comments: Resume prior Home health orders     Order Specific Question Answer Comments   What [facility] Health [facility] the patient [facility]      Notify your health care provider if you experience any of the following:  temperature >100.4     Notify your health care provider if you experience any of the following:  persistent dizziness, light-headedness, or visual disturbances     Notify your health care provider if you experience any of the following:  increased confusion or weakness     Activity as tolerated       Significant Diagnostic Studies: Labs: BMP:   Recent Labs   Lab 05/09/25 0558 05/10/25  0454   GLU 93 97   * 134*   K 4.1 4.2    103   CO2 22* 24   BUN 27* 21   CREATININE 1.8* 1.7*   CALCIUM 8.9 8.9   MG 1.8 1.8    and CBC   Recent Labs   Lab 05/09/25  0558 05/10/25  0454   WBC 7.72 7.29   HGB 9.5* 9.1*   HCT 32.4* 30.5*    229     Radiology Results (last 7 days)    Procedure Component Value Units Date/Time   CT Cervical Spine Without Contrast [[id number]] Resulted: 05/1953   Order Status: Completed Updated: 05/06/25 1955   Narrative:     EXAMINATION:  CT CERVICAL SPINE WITHOUT CONTRAST    CLINICAL HISTORY:  Neck trauma (Age >= 65y);    TECHNIQUE:  Low dose axial images,  sagittal and coronal reformations were performed though the cervical spine.  Contrast was not administered.    COMPARISON:  12/07/2024    FINDINGS:  Reversal of the normal cervical lordosis.  Osteopenia. No detectable acute fracture. Minimal anterolisthesis at C2-3. Moderate to advanced multilevel spondylosis. Up to moderate central canal stenosis. Severe bilateral foraminal stenosis at multiple levels.  Small right thyroid nodule can be further characterized with nonemergent ultrasound   Impression:       No acute findings      Electronically signed by: Marce Narvaez  Date: 05/06/2025  Time: 19:53   CT Head Without Contrast [[id number]] Resulted: 05/06/25 1928   Order Status: Completed Updated: 05/06/25 1930   Narrative:     EXAMINATION:  CT HEAD WITHOUT CONTRAST    CLINICAL HISTORY:  Head trauma, minor (Age >= 65y);    TECHNIQUE:  Low dose axial images were obtained through the head.  Coronal and sagittal reformations were also performed. Contrast was not administered.    COMPARISON:  CT brain 01/18/2025    FINDINGS:  Intracranial compartment:    Ventricles and sulci are normal in size for age without evidence of hydrocephalus. No extra-axial blood or fluid collections.    Moderate chronic microvascular ischemia.  No parenchymal mass, hemorrhage, edema or major vascular distribution infarct.    Skull/extracranial contents (limited evaluation): No fracture. Mastoid air cells and paranasal sinuses are essentially clear.   Impression:       No acute findings      Electronically signed by: Marce Narvaez  Date: 05/06/2025  Time: 19:28   X-Ray Chest AP Portable [[id number]] Resulted: 05/06/25 1908   Order Status: Completed Updated: 05/06/25 1911   Narrative:     EXAMINATION:  XR CHEST AP PORTABLE    CLINICAL HISTORY:  CHF;    TECHNIQUE:  Single frontal view of the chest was performed.    COMPARISON:  01/27/2025    FINDINGS:  Chronic interstitial changes. No focal consolidation.  Enlarged cardiac silhouette.   Atherosclerosis of the aortic arch.   Impression:       As above      Electronically signed by: Marce Narvaez  Date: 05/06/2025  Time: 19:08     Pending Diagnostic Studies:       Procedure Component Value Units Date/Time    EXTRA TUBES [[id number]] Collected: 05/06/25 1919    Order Status: Sent Lab Status: In process Updated: 05/06/25 1920    Specimen: Blood, Venous     Narrative:      The following orders were created for panel order EXTRA TUBES.  Procedure                               Abnormality         Status                     ---------                               -----------         ------                     Light Blue Top Hold[[id number]]                             In process                 Gold Top Hold[[id number]]                                   In process                   Please view results for these tests on the individual orders.           Medications:  Reconciled Home Medications:      Medication List        CHANGE how you take these medications      bumetanide 1 MG tablet  Commonly known as: BUMEX  Take 1 tablet (1 mg total) by mouth once daily.            CONTINUE taking these medications      albuterol-ipratropium 2.5 mg-0.5 mg/3 mL nebulizer solution  Commonly known as: DUO-NEB  Take 3 mLs by nebulization every 4 (four) hours as needed for Wheezing or Shortness of Breath. Rescue     amiodarone 200 MG Tab  Commonly known as: PACERONE  Take 0.5 tablets (100 mg total) by mouth once daily.     amLODIPine 2.5 MG tablet  Commonly known as: NORVASC  Take 1 tablet (2.5 mg total) by mouth every evening.     apixaban 5 mg Tab  Commonly known as: ELIQUIS  Take 1 tablet (5 mg total) by mouth 2 (two) times daily.     aspirin 81 MG EC tablet  Commonly known as: ECOTRIN  Take 1 tablet (81 mg total) by mouth once daily.     BREZTRI AEROSPHERE 160-9-4.8 mcg/actuation Hfaa  Generic drug: budesonide-glycopyr-formoterol  Inhale 2 puffs into the lungs 2 (two) times a day.     buPROPion 150 MG TBSR 12 hr  tablet  Commonly known as: WELLBUTRIN SR  Take 1 tablet (150 mg total) by mouth 2 (two) times daily.     FLUoxetine 40 MG capsule  Take 1 capsule (40 mg total) by mouth once daily.     iron polysaccharides 150 mg iron Cap  Commonly known as: NIFEREX  Take 1 capsule (150 mg total) by mouth once daily.     metoprolol succinate 50 MG 24 hr tablet  Commonly known as: TOPROL-XL  Take 1 tablet (50 mg total) by mouth 2 (two) times daily.     mirtazapine 15 MG tablet  Commonly known as: REMERON  Take 1 tablet (15 mg total) by mouth every evening. For sleep     mupirocin 2 % ointment  Commonly known as: BACTROBAN  Apply topically 2 (two) times daily. To infected skin tear     potassium chloride SA 10 MEQ tablet  Commonly known as: K-DUR,KLOR-CON M  Take 1 tablet (10 mEq total) by mouth once daily.     triamcinolone acetonide 0.1% 0.1 % cream  Commonly known as: KENALOG  Apply topically 2 (two) times daily.     warfarin 2.5 MG tablet  Commonly known as: COUMADIN  Take 1-1.5 Tablets QPM as instructed by coumadin clinic            STOP taking these medications      atorvastatin 10 MG tablet  Commonly known as: LIPITOR     furosemide 40 MG tablet  Commonly known as: LASIX     gabapentin 300 MG capsule  Commonly known as: NEURONTIN     spironolactone 25 MG tablet  Commonly known as: ALDACTONE              Indwelling Lines/Drains at time of discharge:   Lines/Drains/Airways       None                   Time spent on the discharge of patient: 35 minutes         Ramonita León MD  Department of Hospital Medicine  [facility]

## 2025-05-10 NOTE — PROGRESS NOTES
INPATIENT NEPHROLOGY Progress Note   [facility]    Patient Name: Iris Mueller  Date: 05/10/2025    Reason for consultation:    Chief Complaint:   Chief Complaint   Patient presents with    Fatigue    Fall     3 times today, hematoma above right eye    Weakness       History of Present Illness:  69 year old pt getting admitted with recurrent falls /JEFF. Pt also c/o back pain but she said its been there since she had back surgery long time back. Because of the falls she came to ER and found to have JEFF and got admitted.She was sleepy and was little confused upon presentation. Her condition improved with iv fluids and she was no longer confused. Currently in observation. Consulted for JEFF.    Interval History:  5/7- hypotensive at admission, on 2L NC, SCr 2.2 in April, up to 3.1 today- on NS 100cc/hr, UA bland,  (628), trop flat, lactic acid normal, CXR clear, CT head/C spine neg  5/8-  VSS.  800 ml UOP recorded for one shift.  Renal function improved.  No complaints.  5/9- BP improved, on RA, UOP not recorded, renal function improved  5/10- VSS, on RA, UOP not recorded, renal function improved    Plan of Care:    Assessment:  JEFF/CKD III  Baseline severe AS with CHF  Baseline HTN  Baseline AF  Hyponatremia  SHPT  Anemia of CKD    Plan:    - suspect JEFF from hypotension/volume depletion- improved with IVFs and holding bumex and aldactone- resume bumex 1mg daily- repeat labs next week- call office if with s/s volume overload  - compensated on exam- 2g salt, 1.5-2L fluid restriction  - defer metoprolol, norvasc to   - on amio, eliquis  - serum Na improved with above management  - no active BMM issues  - iron def- added iron supp daily    Thank you for allowing us to participate in this patient's care. We will continue to follow.    Vital Signs:  Temp Readings from Last 3 Encounters:   05/10/25 97.7 °F (36.5 °C) (Oral)   01/28/25 98.2 °F (36.8 °C) (Oral)   12/17/24 97.6 °F (36.4  "°C) (Oral)       Pulse Readings from Last 3 Encounters:   05/10/25 97   03/06/25 79   02/20/25 69       BP Readings from Last 3 Encounters:   05/10/25 (!) 172/115   04/18/25 115/70   03/06/25 130/70       Weight:  Wt Readings from Last 3 Encounters:   05/07/25 85.5 kg (188 lb 7.9 oz)   04/18/25 89.4 kg (197 lb 1.6 oz)   03/06/25 98.9 kg (218 lb)       INS/OUTS:  I/O last 3 completed shifts:  In: 1230 [P.O.:240; I.V.:990]  Out: 1 [Stool:1]  I/O this shift:  In: 120 [P.O.:120]  Out: -     Medications:  Scheduled Meds:   amiodarone  100 mg Oral Daily    amLODIPine  2.5 mg Oral Daily    apixaban  5 mg Oral BID    budesonide  1 mg Nebulization Q12H    And    arformoteroL  15 mcg Nebulization BID    ferrous sulfate  1 tablet Oral Daily    ipratropium  0.5 mg Nebulization Q6H    metoprolol succinate  50 mg Oral Daily    pantoprazole  40 mg Oral Before breakfast     Continuous Infusions:      PRN Meds:.  Current Facility-Administered Medications:     acetaminophen, 650 mg, Oral, Q4H PRN    aluminum-magnesium hydroxide-simethicone, 30 mL, Oral, QID PRN    HYDROcodone-acetaminophen, 1 tablet, Oral, Q6H PRN    magnesium oxide, 800 mg, Oral, PRN    magnesium oxide, 800 mg, Oral, PRN    melatonin, 6 mg, Oral, Nightly PRN    naloxone, 0.02 mg, Intravenous, PRN    ondansetron, 4 mg, Intravenous, Q6H PRN    potassium bicarbonate, 35 mEq, Oral, PRN    potassium bicarbonate, 50 mEq, Oral, PRN    potassium bicarbonate, 60 mEq, Oral, PRN    potassium, sodium phosphates, 2 packet, Oral, PRN    potassium, sodium phosphates, 2 packet, Oral, PRN    potassium, sodium phosphates, 2 packet, Oral, PRN  Medications Ordered Prior to Encounter[1]    Review of Systems:  Neg    Physical Exam:  BP (!) 172/115 (BP Location: Right arm, Patient Position: Sitting)   Pulse 97   Temp 97.7 °F (36.5 °C) (Oral)   Resp 18   Ht 5' 3" (1.6 m)   Wt 85.5 kg (188 lb 7.9 oz)   SpO2 95%   BMI 33.39 kg/m²     General Appearance:    NAD, AAO x 3, cooperative, " appears stated age   Head:    Normocephalic, atraumatic   Eyes:    PER, EOMI, and conjunctiva/sclera clear bilaterally        Mouth:   Moist mucus membranes, no thrush or oral lesions, normal      dentition   Back:     No CVA tenderness   Lungs:     Clear to auscultation bilaterally, no wheeze, crackles, rales      or rhonchi, symmetric air movement, respirations unlabored   Heart:    Regular rate and rhythm, S1 and S2 normal, no murmur, rub   or gallop   Abdomen:     Soft, non-tender, non-distended, bowel sounds active all four   quadrants, no RT or guarding, no masses, no organomegaly   Extremities:   Warm and well perfused, distal pulses intact, no cyanosis or    peripheral edema   MSK:   No joint or muscle swelling, tenderness or deformity   Skin:   Skin color, texture, turgor normal, no rashes or lesions   Neurologic/Psychiatric:   CNII-XII intact, normal strength and sensation       throughout, no asterixis; normal affect, memory, judgement     and insight     Results:  Lab Results   Component Value Date     (L) 05/10/2025    K 4.2 05/10/2025     05/10/2025    CO2 24 05/10/2025    BUN 21 05/10/2025    CREATININE 1.7 (H) 05/10/2025    CALCIUM 8.9 05/10/2025    ANIONGAP 7 (L) 05/10/2025    ESTGFRAFRICA 52 (L) 04/25/2022    EGFRNONAA 45 (L) 04/25/2022       Lab Results   Component Value Date    CALCIUM 8.9 05/10/2025    PHOS 3.9 01/28/2025       Recent Labs   Lab 05/10/25  0454   WBC 7.29   RBC 3.45*   HGB 9.1*   HCT 30.5*      MCV 88   MCH 26.4*   MCHC 29.8*       I have personally reviewed pertinent radiological imaging and reports from this admission.    I have spent > 35 minutes providing care for this patient for the above diagnoses. These services have included chart/data/imaging review, evaluation, exam, formulation of plan, , note preparation, and discussions with staff involved in this patient's care.    Brett Deng MD    [facility] [address]  [facility]  [place] [address]  [phone] (p)  [phone] (f)         [1]   Current Facility-Administered Medications on File Prior to Encounter   Medication Dose Route Frequency Provider Last Rate Last Admin    lidocaine (PF) 10 mg/ml (1%) injection 5 mg  0.5 mL Intradermal Once Azeem Anderson MD         Current Outpatient Medications on File Prior to Encounter   Medication Sig Dispense Refill    furosemide (LASIX) 40 MG tablet Take 40 mg by mouth once daily.      albuterol-ipratropium (DUO-NEB) 2.5 mg-0.5 mg/3 mL nebulizer solution Take 3 mLs by nebulization every 4 (four) hours as needed for Wheezing or Shortness of Breath. Rescue 75 mL 0    amiodarone (PACERONE) 200 MG Tab Take 0.5 tablets (100 mg total) by mouth once daily. 90 tablet 3    amLODIPine (NORVASC) 2.5 MG tablet Take 1 tablet (2.5 mg total) by mouth every evening. 90 tablet 3    apixaban (ELIQUIS) 5 mg Tab Take 1 tablet (5 mg total) by mouth 2 (two) times daily. 60 tablet 11    aspirin (ECOTRIN) 81 MG EC tablet Take 1 tablet (81 mg total) by mouth once daily. 30 tablet 0    atorvastatin (LIPITOR) 10 MG tablet Take 1 tablet (10 mg total) by mouth every evening. 90 tablet 3    budesonide-glycopyr-formoterol (BREZTRI AEROSPHERE) 160-9-4.8 mcg/actuation HFAA Inhale 2 puffs into the lungs 2 (two) times a day.      bumetanide (BUMEX) 1 MG tablet Take 1 tablet (1 mg total) by mouth once daily. (Patient taking differently: Take 1 mg by mouth 2 (two) times a day.) 90 tablet 4    buPROPion (WELLBUTRIN SR) 150 MG TBSR 12 hr tablet Take 1 tablet (150 mg total) by mouth 2 (two) times daily. 180 tablet 3    FLUoxetine 40 MG capsule Take 1 capsule (40 mg total) by mouth once daily. 90 capsule 3    gabapentin (NEURONTIN) 300 MG capsule Take 1 capsule (300 mg total) by mouth nightly as needed (pain). (Patient taking differently: Take 300 mg by mouth once daily.) 30 capsule 2    iron polysaccharides (NIFEREX) 150 mg iron Cap Take 1 capsule (150 mg total) by mouth  once daily. 90 capsule 0    metoprolol succinate (TOPROL-XL) 50 MG 24 hr tablet Take 1 tablet (50 mg total) by mouth 2 (two) times daily. 180 tablet 3    mirtazapine (REMERON) 15 MG tablet Take 1 tablet (15 mg total) by mouth every evening. For sleep 30 tablet 3    mupirocin (BACTROBAN) 2 % ointment Apply topically 2 (two) times daily. To infected skin tear 22 g 1    potassium chloride SA (K-DUR,KLOR-CON M) 10 MEQ tablet Take 1 tablet (10 mEq total) by mouth once daily. 30 tablet 5    spironolactone (ALDACTONE) 25 MG tablet Take 1 tablet (25 mg total) by mouth once daily. 90 tablet 3    triamcinolone acetonide 0.1% (KENALOG) 0.1 % cream Apply topically 2 (two) times daily. 60 g 2    warfarin (COUMADIN) 2.5 MG tablet Take 1-1.5 Tablets QPM as instructed by coumadin clinic 40 tablet 3

## 2025-05-12 ENCOUNTER — PATIENT MESSAGE (OUTPATIENT)
Dept: ADMINISTRATIVE | Facility: CLINIC | Age: 70
End: 2025-05-12
Payer: MEDICARE

## 2025-05-12 DIAGNOSIS — G89.4 CHRONIC PAIN SYNDROME: Primary | ICD-10-CM

## 2025-05-12 DIAGNOSIS — M15.0 PRIMARY OSTEOARTHRITIS INVOLVING MULTIPLE JOINTS: ICD-10-CM

## 2025-05-12 NOTE — TELEPHONE ENCOUNTER
----- Message from Nurse Horner sent at 5/12/2025  8:28 AM CDT -----  Call patient - needs post-hospital phone call within 2 business days and hospital follow up visit scheduled within 7-14 days.

## 2025-05-13 ENCOUNTER — NURSE TRIAGE (OUTPATIENT)
Dept: ADMINISTRATIVE | Facility: CLINIC | Age: 70
End: 2025-05-13
Payer: MEDICARE

## 2025-05-13 ENCOUNTER — PATIENT OUTREACH (OUTPATIENT)
Dept: ADMINISTRATIVE | Facility: CLINIC | Age: 70
End: 2025-05-13
Payer: MEDICARE

## 2025-05-13 ENCOUNTER — OCHSNER VIRTUAL EMERGENCY DEPARTMENT (OUTPATIENT)
Facility: CLINIC | Age: 70
End: 2025-05-13
Payer: MEDICARE

## 2025-05-13 ENCOUNTER — PATIENT OUTREACH (OUTPATIENT)
Facility: OTHER | Age: 70
End: 2025-05-13
Payer: MEDICARE

## 2025-05-13 ENCOUNTER — PATIENT OUTREACH (OUTPATIENT)
Dept: FAMILY MEDICINE | Facility: CLINIC | Age: 70
End: 2025-05-13
Payer: MEDICARE

## 2025-05-13 DIAGNOSIS — M79.605 LEFT LEG PAIN: ICD-10-CM

## 2025-05-13 DIAGNOSIS — M25.559 HIP PAIN, UNSPECIFIED LATERALITY: Primary | ICD-10-CM

## 2025-05-13 RX ORDER — TRAMADOL HYDROCHLORIDE 50 MG/1
50 TABLET, FILM COATED ORAL EVERY 6 HOURS PRN
Qty: 20 TABLET | Refills: 0 | Status: SHIPPED | OUTPATIENT
Start: 2025-05-13

## 2025-05-13 NOTE — PROGRESS NOTES
C3 nurse spoke with patient who is unable to complete the call at this time. Patient Requested a Callback. Patient does not have a HOSFU. Message routed to PCP's staff assistance needed with scheduling a HOSFU.

## 2025-05-13 NOTE — TELEPHONE ENCOUNTER
Pt reports she was seen in the hospital earlier this month, having Left sided hip pain that goes down her leg, having to use a walker to get around, pt states she has reached out to her PCP trying to get pain medication and was advised to f/u with a Dr. Mari [facility], pt has not been able to set up the appt yet, but needing something for her pain. Pt advised to go to the ED now (or office with PCP approval) per protocol, Pt was referred to [facility], Dr. Recinos, advised that with pt's complex medical history she would need to be evaluated in person to obtain a new prescription, Yenni checked to see when Dr. Mari would be available to see her, there were appts for tomorrow, but pt states she would need more time to set up her transportation, so an appt was scheduled by Yenni with Dr. Mari for Thursday May 15th at 11am per pt's request. Pt encouraged to call back with any worsening symptoms or questions. She verbalized understanding.    Reason for Disposition   Thigh or calf pain in only one leg and present > 1 hour    Additional Information   Negative: Looks like a broken bone or dislocated joint (e.g., crooked or deformed)   Negative: Sounds like a life-threatening emergency to the triager   Negative: Chest pain   Negative: Difficulty breathing   Negative: Entire foot is cool or blue in comparison to other side   Negative: Unable to walk   Negative: Fever and red area (or area very tender to touch)   Negative: Swollen joint and fever    Protocols used: Leg Pain-A-[place]

## 2025-05-13 NOTE — PLAN OF CARE-OVED
[facility] Plan of Care Note  Referral Source: Nurse On-Call                               Chief Complaint   Patient presents with    Hip Pain     68 yo female with PMHx of HTN, OA, COPD, obesity calls for leg pain that keeps her up at night, was advised to reach out to her PCP and pt states she did leave a message, but has not got a call back as of yet. Pt is wanting to be prescribed something for her leg pain. Pain radiates from left hip down left leg.     Recently admitted for falls and JEFF. Was evaluated for pain radiating from hip down left leg by PA at home yesterday per chart review and referred to Dr. Davis. Patient hesitant to see Dr. Davis and reports that is for her back and she believes this is from her hip. Was able to ambulate with PT with rolling walker at her last hospitalization. Do not see any imaging of hip/back done at that hospitalization. At that time reported chronic thoracic and right lateral back pain. PT reports left posterior thigh pain 2/2 sciatic nerve pain.     Recommendation: Specialist Referral         Specialty: Physical Medicine Rehab                Future Appointments   Date Time Provider Department Center   5/15/2025 11:00 AM Elkin Mari MD [facility] [place]   5/20/2025  2:00 PM Roselyn Cote NP [facility]   6/5/2025  3:40 PM Elkin You MD [facility] at [facility]   7/14/2025  2:40 PM Riki Ruiz MD [facility] [place] [facility]     To ED/ for severe pain not controlled at home for imaging, otherwise Dr. Mari for further evaluation and care.       Encounter Diagnoses   Name Primary?    Hip pain, unspecified laterality Yes    Left leg pain

## 2025-05-13 NOTE — TELEPHONE ENCOUNTER
Called for HFU appt to be booked and complete her follow up call, she said [facility] came out today already, she is c/o left hip/leg pain and that is what is affecting her walking. I did not see any imaging done while admitted but they did refer her to Dr. Mari, they reached out and she declined bc she said her back doesn't hurt it's her leg and hip. [address] is supposed to reach out to us for sooner appt and pain meds.

## 2025-05-13 NOTE — TELEPHONE ENCOUNTER
----- Message from Med Assistant Fajardo sent at 5/13/2025  3:14 PM CDT -----  Regarding: FW: Medication    ----- Message -----  From: Shanell Box LPN  Sent: 5/13/2025   3:11 PM CDT  To: Elkin You Staff  Subject: Medication                                       Patient reports taking the following medications that are not a part of the discharge summary or MAR. PLEASE RESPOND DIRECTLY TO THE PT IN REGARD TO THIS MESSAGE.Tylenol 500 mg tablet as needed.Respectfully,Rafia BECERRIL, lpnTransition of Care

## 2025-05-13 NOTE — PROGRESS NOTES
C3 nurse spoke with Iris Mueller  for a TCC post hospital discharge follow up call. The patient has a scheduled [facility] appointment with Roselyn Cote NP on 5/20/2025 @ 2 PM.

## 2025-05-13 NOTE — TELEPHONE ENCOUNTER
Let pt know I can send in a few tramadol for pain but I would recommend she see Dr. Mari on 5/15 as scheduled and he can determine what imaging/treatment is needed for her pain   Yes

## 2025-05-13 NOTE — PROGRESS NOTES
Discharge Information     Discharge Date:  5/10/2025     Primary Discharge Diagnosis:  JEFF, recurrent falls      Discharge Summary:  Reviewed      Medication & Order Review     Were medication changes made or new medications added?   No    If so, has the patient filled the prescriptions?  No     Was Home Health ordered? No    If so, has Home Health contacted patient and/or initiated services?  No    Name of Home Health Agency? N/A    Durable Medical Equipment ordered?  No     If so, has the DME provider contacted patient and delivered equipment?  N/A    Follow Up               Any problems since discharge? Yes    How is the patient feeling since returning home?      Have you set up recommended follow up appointments?  (cardiology, surgery, etc.)    Schedule Hospital Follow-up appointment within 7-14 days (preferably 7).      Notes:  Spoke to patient and scheduled HFU with Roselyn Cote NP on 5/20/2025 @ 2:00. While discussing hospitalization she states her hip and leg are still very painful. An TACO from [employer] came out to her house for visit and she discussed the hip/leg pain with them, they were to be reaching out to our office for further assistance. No pain medication given @ discharge. They did refer her to Dr. Mari but she declined when they called because she does not seem to think it's from back pain, message taken to give to provider.      Barbara Ma

## 2025-05-14 NOTE — PROGRESS NOTES
"Patient spoke with OOC RN on 5/13/2025 with complaint of "pt that was seen in the hospital earlier this month, but having leg pain that keeps her up at night, was advised to reach out to her PCP and pt states she did leave a message, but has not got a call back as of yet. Pt is wanting to be prescribed something for her leg pain."    OOC RN consulted with [facility] provider, Dr. Recinos, and disposition recommended was specialty/back and spine follow up.  Patient's back and spine appointment scheduled for May 15, 2025 at 11 am with Elkin Mari MD.  Will follow up with patient during the week of May 16-20, 2025 to assess for any additional concerns/needs.    "

## 2025-05-15 ENCOUNTER — PATIENT MESSAGE (OUTPATIENT)
Dept: FAMILY MEDICINE | Facility: CLINIC | Age: 70
End: 2025-05-15
Payer: MEDICARE

## 2025-05-20 ENCOUNTER — OFFICE VISIT (OUTPATIENT)
Dept: FAMILY MEDICINE | Facility: CLINIC | Age: 70
End: 2025-05-20
Payer: MEDICARE

## 2025-05-20 ENCOUNTER — TELEPHONE (OUTPATIENT)
Dept: FAMILY MEDICINE | Facility: CLINIC | Age: 70
End: 2025-05-20

## 2025-05-20 VITALS
HEIGHT: 63 IN | SYSTOLIC BLOOD PRESSURE: 148 MMHG | OXYGEN SATURATION: 99 % | HEART RATE: 70 BPM | WEIGHT: 207 LBS | DIASTOLIC BLOOD PRESSURE: 90 MMHG | BODY MASS INDEX: 36.68 KG/M2

## 2025-05-20 DIAGNOSIS — E78.2 MIXED HYPERLIPIDEMIA: ICD-10-CM

## 2025-05-20 DIAGNOSIS — R19.7 DIARRHEA, UNSPECIFIED TYPE: ICD-10-CM

## 2025-05-20 DIAGNOSIS — N17.9 AKI (ACUTE KIDNEY INJURY): Primary | ICD-10-CM

## 2025-05-20 DIAGNOSIS — I12.9 HYPERTENSIVE KIDNEY DISEASE WITH STAGE 4 CHRONIC KIDNEY DISEASE: ICD-10-CM

## 2025-05-20 DIAGNOSIS — I50.32 CHRONIC HEART FAILURE WITH PRESERVED EJECTION FRACTION: ICD-10-CM

## 2025-05-20 DIAGNOSIS — I35.0 MODERATE AORTIC STENOSIS: ICD-10-CM

## 2025-05-20 DIAGNOSIS — M15.0 PRIMARY OSTEOARTHRITIS INVOLVING MULTIPLE JOINTS: ICD-10-CM

## 2025-05-20 DIAGNOSIS — M54.16 LUMBAR RADICULOPATHY: ICD-10-CM

## 2025-05-20 DIAGNOSIS — R29.6 RECURRENT FALLS: ICD-10-CM

## 2025-05-20 DIAGNOSIS — N18.4 HYPERTENSIVE KIDNEY DISEASE WITH STAGE 4 CHRONIC KIDNEY DISEASE: ICD-10-CM

## 2025-05-20 DIAGNOSIS — I48.19 PERSISTENT ATRIAL FIBRILLATION: ICD-10-CM

## 2025-05-20 PROCEDURE — 99215 OFFICE O/P EST HI 40 MIN: CPT | Mod: S$GLB,,, | Performed by: NURSE PRACTITIONER

## 2025-05-20 PROCEDURE — 3080F DIAST BP >= 90 MM HG: CPT | Mod: CPTII,S$GLB,, | Performed by: NURSE PRACTITIONER

## 2025-05-20 PROCEDURE — 3077F SYST BP >= 140 MM HG: CPT | Mod: CPTII,S$GLB,, | Performed by: NURSE PRACTITIONER

## 2025-05-20 PROCEDURE — 1100F PTFALLS ASSESS-DOCD GE2>/YR: CPT | Mod: CPTII,S$GLB,, | Performed by: NURSE PRACTITIONER

## 2025-05-20 PROCEDURE — 1125F AMNT PAIN NOTED PAIN PRSNT: CPT | Mod: CPTII,S$GLB,, | Performed by: NURSE PRACTITIONER

## 2025-05-20 PROCEDURE — 1160F RVW MEDS BY RX/DR IN RCRD: CPT | Mod: CPTII,S$GLB,, | Performed by: NURSE PRACTITIONER

## 2025-05-20 PROCEDURE — 1159F MED LIST DOCD IN RCRD: CPT | Mod: CPTII,S$GLB,, | Performed by: NURSE PRACTITIONER

## 2025-05-20 PROCEDURE — 3008F BODY MASS INDEX DOCD: CPT | Mod: CPTII,S$GLB,, | Performed by: NURSE PRACTITIONER

## 2025-05-20 PROCEDURE — 1111F DSCHRG MED/CURRENT MED MERGE: CPT | Mod: CPTII,S$GLB,, | Performed by: NURSE PRACTITIONER

## 2025-05-20 PROCEDURE — 3288F FALL RISK ASSESSMENT DOCD: CPT | Mod: CPTII,S$GLB,, | Performed by: NURSE PRACTITIONER

## 2025-05-20 RX ORDER — DIPHENOXYLATE HYDROCHLORIDE AND ATROPINE SULFATE 2.5; .025 MG/1; MG/1
1 TABLET ORAL 2 TIMES DAILY PRN
Qty: 30 TABLET | Refills: 0 | Status: SHIPPED | OUTPATIENT
Start: 2025-05-20 | End: 2025-05-30

## 2025-05-20 RX ORDER — AMLODIPINE BESYLATE 2.5 MG/1
2.5 TABLET ORAL 2 TIMES DAILY
Qty: 180 TABLET | Refills: 3 | Status: SHIPPED | OUTPATIENT
Start: 2025-05-20

## 2025-05-20 NOTE — TELEPHONE ENCOUNTER
----- Message from Nurse Klein sent at 5/20/2025 10:05 AM CDT -----  Contact: rahul    ----- Message -----  From: Chioma Diaz  Sent: 5/20/2025  10:05 AM CDT  To: Elkin Garcia with [facility] calling about patient having a fall. Sending an order for wound care. [phone]

## 2025-05-20 NOTE — PROGRESS NOTES
Patient ID: Iris Mueller is a 69 y.o. female.    Chief Complaint: Follow-up (No bottles//Pt is here for a HFU, for JEFF and recurrent falls. Pt stated that she is still having pain left side hip that radiated down the to the left foot//Pt will scheduled mammo later//Medication refills//JAIME )        Admit Date: 5/6/25   Discharge Date: 5/10/25  Discharge Facility: Hospital    Medication Reconciliation:  Medications changed/added/deleted. Bumetadine 1mg daily. Furosemide, gabapentin and spironolactone stopped  New Prescriptions filled after discharge: yes  Discharge summary reviewed:  yes  Pending test results at discharge reviewed:   not applicable  Follow up appointments scheduled:  yes   Dr. Deng in 2 weeks  Follow up labs/tests ordered:   yes  Home Health ordered on discharge:   yes  Home Health company name: [facility]  DME ordered at discharge:   no  Disease/illness education: recurrent falls  Discussion with other health care providers: no  Establishment or re-establishment of referral orders for community resources: No other necessary community resources  Family and/or Caretaker present at visit?  yes    Discharge Summary: 69 year old pt getting admitted with recurrent falls /JEFF  Pt also c/o back pain but she said its been there since she had back surgery long time back  Because of the falls she came to ER and found to have JEFF and got admitted  She was sleepy and was little confused upon presentation  Her condition improved with iv fluids and she was no longer confused when saw in ER  Pt denied any other issues or c/o       Hospital Course:   The patient was admitted to the hospital medicine service and closely monitored for her recurrent falls and acute kidney injury.  The patient was maintained on IV fluids.  Nephrology was consulted.  Nephrotoxic medications were held.  Nephrology was consulted.  Imaging of head neck and shoulder were negative for acute findings.  PT and OT were consulted.   Kidney function improved.  Patient was cleared for discharge by Nephrology on Bumex 1 mg daily, no Aldactone.  BNP ordered for next week and patient instructed to call Nephrology office if she feels overloaded.  Patient was seen and examined on day of discharge and deemed suitable for discharge home.  Discharge plans discussed with nephrologist Dr. Deng     How patient is feeling since discharge from the hospital?  Pt here with her  and daughter    Pt reports she's fallen several more times since hospital discharge. Reports loses her balance and will just fall backwards- fell on Friday, Saturday and Sunday. Has skin tear to left forearm but denies any other injuries with these recent falls. She denies dizziness or palpitations prior to fall and no LOC or head trauma    Pt reports since her fall prior to this recent hospital stay she's been having left lower back pain which radiates into buttock and down left leg. Describes pain as severe at times. Pt reports she has of lumbar DDD and has had back pain for years but this pain is much worse. She was previously on chronic opioids but these were stopped after numerous falls. After this hospital stay we sent in small quantity of tramadol but she reports it's not helping a lot and daughter has concerns about her taking pain meds. Daughter reports she was taking tramadol every 4-6 hours and feels pain med is contributing to her numerous falls. Pt and daughter apparently have been arguing over her use of pain medication. Daughter sets up her pillbox but pt is managing pain med on her own. She was referred to , PMR but had to cancel appt yesterday. Doesn't appear she had back/hip exercise after recent fall prompting admit    Has been seeing Dr. Deng for CKD 4- believes  is drawing labs soon for Dr. Deng and has appt in about 2 weeks. Has been on bumex 1mg daily since discharge. Daughter reports pt was seen this weekend by MD with [facility]  program sent by her insurance- she reports he gave pt an extra dose of IV lasix. Reports leg swelling has improved    Also saw Dr. Ghosh, cards last month for AS- mod to severe on echo. He ordered coronary calcium score for possible TAVR though daughter reports they had to cancel d/t recent hospital stay.  switched her to warfarin as pt couldn't afford eliquis but recommends watchman procedure d/t recurrent falls.  has been performing regular INR checks and managed by [facility]. Denies any bleeding issues  but hasn't seen either since    Reports BP has been running high since discharge      Patient follow up phone call documented on separate encounter.    Anti-coag visit on 05/19/2025   Component Date Value Ref Range Status    INR 05/19/2025 2.7   Final   Anti-coag visit on 05/15/2025   Component Date Value Ref Range Status    INR 05/15/2025 1.8   Final   Anti-coag visit on 05/12/2025   Component Date Value Ref Range Status    INR 05/12/2025 1.2   Final   No results displayed because visit has over 200 results.      Anti-coag visit on 05/05/2025   Component Date Value Ref Range Status    INR 05/05/2025 1.8   Final   Anti-coag visit on 05/01/2025   Component Date Value Ref Range Status    INR 05/01/2025 2.0   Final   Anti-coag visit on 04/28/2025   Component Date Value Ref Range Status    INR 04/28/2025 3.2   Final   Anti-coag visit on 04/25/2025   Component Date Value Ref Range Status    INR 04/25/2025 4.1   Final   Lab Requisition on 04/22/2025   Component Date Value Ref Range Status    Sodium 04/22/2025 136  136 - 145 mmol/L Final    Potassium 04/22/2025 4.3  3.5 - 5.1 mmol/L Final    Chloride 04/22/2025 100  95 - 110 mmol/L Final    CO2 04/22/2025 22 (L)  23 - 29 mmol/L Final    Glucose 04/22/2025 61 (L)  70 - 110 mg/dL Final    BUN 04/22/2025 29 (H)  8 - 23 mg/dL Final    Creatinine 04/22/2025 2.2 (H)  0.5 - 1.4 mg/dL Final    Calcium 04/22/2025 9.1  8.7 - 10.5 mg/dL Final    Protein Total  04/22/2025 7.1  6.0 - 8.4 gm/dL Final    Albumin 04/22/2025 3.6  3.5 - 5.2 g/dL Final    Bilirubin Total 04/22/2025 0.3  0.1 - 1.0 mg/dL Final    ALP 04/22/2025 169 (H)  40 - 150 unit/L Final    AST 04/22/2025 24  11 - 45 unit/L Final    ALT 04/22/2025 18  10 - 44 unit/L Final    Anion Gap 04/22/2025 14  8 - 16 mmol/L Final    eGFR 04/22/2025 23 (L)  >60 mL/min/1.73/m2 Final    TSH 04/22/2025 0.989  0.400 - 4.000 uIU/mL Final    BNP 04/22/2025 628 (H)  <=99 pg/mL Final    Cholesterol Total 04/22/2025 157  120 - 199 mg/dL Final    Triglyceride 04/22/2025 105  30 - 150 mg/dL Final    HDL Cholesterol 04/22/2025 45  40 - 75 mg/dL Final    LDL Cholesterol 04/22/2025 91.0  63.0 - 159.0 mg/dL Final    HDL/Cholesterol Ratio 04/22/2025 28.7  20.0 - 50.0 % Final    Cholesterol/HDL Ratio 04/22/2025 3.5  2.0 - 5.0 Final    Non HDL Cholesterol 04/22/2025 112  mg/dL Final    WBC 04/22/2025 7.28  3.90 - 12.70 K/uL Final    RBC 04/22/2025 4.05  4.00 - 5.40 M/uL Final    Hgb 04/22/2025 10.6 (L)  12.0 - 16.0 gm/dL Final    Hct 04/22/2025 35.4 (L)  37.0 - 48.5 % Final    MCV 04/22/2025 87  82 - 98 fL Final    MCH 04/22/2025 26.2 (L)  27.0 - 31.0 pg Final    MCHC 04/22/2025 29.9 (L)  32.0 - 36.0 g/dL Final    RDW 04/22/2025 17.3 (H)  11.5 - 14.5 % Final    Platelet Count 04/22/2025 339  150 - 450 K/uL Final    MPV 04/22/2025 8.7 (L)  9.2 - 12.9 fL Final    Nucleated RBC 04/22/2025 0  <=0 /100 WBC Final    Neut % 04/22/2025 74.4 (H)  38 - 73 % Final    Lymph % 04/22/2025 13.2 (L)  18 - 48 % Final    Mono % 04/22/2025 6.9  4 - 15 % Final    Eos % 04/22/2025 4.4  0 - 8 % Final    Basophil % 04/22/2025 0.7  <=1.9 % Final    Imm Grans % 04/22/2025 0.4  0.0 - 0.5 % Final    Neut # 04/22/2025 5.4  1.8 - 7.7 K/uL Final    Lymph # 04/22/2025 0.96 (L)  1 - 4.8 K/uL Final    Mono # 04/22/2025 0.50  0.3 - 1 K/uL Final    Eos # 04/22/2025 0.32  <=0.5 K/uL Final    Baso # 04/22/2025 0.05  <=0.2 K/uL Final    Imm Grans # 04/22/2025 0.03  0.00 - 0.04  K/uL Final    Free T4 2025 1.34  0.71 - 1.51 ng/dL Final   Anti-coag visit on 2025   Component Date Value Ref Range Status    INR 2025 2.6   Final   There may be more visits with results that are not included.       Past Medical History:   Diagnosis Date    Anemia, unspecified     Arthritis     Asthma     COPD (chronic obstructive pulmonary disease)     Encounter for blood transfusion     Hypertension     Obese body habitus     Wound infection 2019    Right knee     Past Surgical History:   Procedure Laterality Date    ANGIOGRAM, CORONARY, WITH LEFT HEART CATHETERIZATION N/A 2022    Procedure: Angiogram, Coronary, with Left Heart Cath;  Surgeon: Jorge Tran MD;  Location: [facility] CATH/EP LAB;  Service: Cardiology;  Laterality: N/A;     SECTION      ECHOCARDIOGRAM,TRANSESOPHAGEAL N/A 2023    Procedure: Transesophageal echo (MARIANO) intra-procedure log documentation;  Surgeon: Vikram, Araceli Diagnostic;  Location: [facility] EP LAB;  Service: Cardiology;  Laterality: N/A;    INCISION AND DRAINAGE OF HEMATOMA Left 2024    Procedure: INCISION AND DRAINAGE, HEMATOMA;  Surgeon: Bryson Huerta MD;  Location: [facility] OR;  Service: Orthopedics;  Laterality: Left;    JOINT REPLACEMENT      Knee    KNEE ARTHROPLASTY Right 2019    Procedure: ARTHROPLASTY, KNEE;  Surgeon: Delio Chung MD;  Location: [facility] OR;  Service: Orthopedics;  Laterality: Right;  anesthesia:  general and block    REPLACEMENT OF WOUND VACUUM-ASSISTED CLOSURE DEVICE Right 2019    Procedure: REPLACEMENT, WOUND VAC;  Surgeon: Delio Chung MD;  Location: [facility] OR;  Service: Orthopedics;  Laterality: Right;    TONSILLECTOMY      TREATMENT OF CARDIAC ARRHYTHMIA N/A 2023    Procedure: Cardioversion or Defibrillation;  Surgeon: PJ Betancuort MD;  Location: [facility] EP LAB;  Service: Cardiology;  Laterality: N/A;  AF, MARIANO, DCCV, MAC, EH, 3 Prep    VENTRICULOGRAM, LEFT  2022    Procedure:  Ventriculogram, Left;  Surgeon: Jorge Tran MD;  Location: [facility] CATH/EP LAB;  Service: Cardiology;;     Family History   Problem Relation Name Age of Onset    Alzheimer's disease Mother         All of your core healthy metrics are met.      Marital Status:   Alcohol History:  reports current alcohol use.  Tobacco History:  reports that she quit smoking about 4 months ago. Her smoking use included cigarettes. She started smoking about 50 years ago. She has a 23.7 pack-year smoking history. She has been exposed to tobacco smoke. She has never used smokeless tobacco.  Drug History:  reports no history of drug use.    Review of patient's allergies indicates:  No Known Allergies  Current Medications[1]    Review of Systems   Constitutional:  Positive for fatigue. Negative for appetite change, chills, fever and unexpected weight change (wt down 11lbs since March visit).   HENT:  Negative for sore throat and trouble swallowing.    Eyes:  Negative for visual disturbance.   Respiratory:  Positive for shortness of breath (with exertion). Negative for cough and wheezing.    Cardiovascular:  Positive for leg swelling (improved). Negative for chest pain and palpitations.   Gastrointestinal:  Negative for abdominal pain, diarrhea, nausea and vomiting.   Genitourinary:  Positive for frequency. Negative for dysuria and hematuria.   Musculoskeletal:  Positive for arthralgias (bilat knee pain), back pain and gait problem (several recent falls- see HPI). Negative for joint swelling.   Skin:  Negative for color change and wound.   Neurological:  Negative for dizziness, syncope, speech difficulty and headaches.   Hematological:  Bruises/bleeds easily.   Psychiatric/Behavioral:  Positive for sleep disturbance. Negative for confusion and dysphoric mood. The patient is not nervous/anxious.         Objective:      Vitals:    05/20/25 1359 05/20/25 1409   BP: (!) 152/92 (!) 148/90   Pulse: 70    SpO2: 99%    Weight: 93.9 kg (207  "lb)    Height: 5' 3" (1.6 m)      Physical Exam  Vitals and nursing note reviewed.   Constitutional:       General: She is not in acute distress.     Appearance: She is well-developed.      Comments: Morbidly obese white female, appears older than stated age, ambulating with rollator   HENT:      Head: Normocephalic and atraumatic.      Right Ear: Tympanic membrane and ear canal normal.      Left Ear: Tympanic membrane and ear canal normal.   Neck:      Vascular: No carotid bruit.   Cardiovascular:      Rate and Rhythm: Normal rate. Rhythm irregular.      Heart sounds: Murmur (2nd ICS RSB) heard.      Systolic murmur is present with a grade of 3/6.   Pulmonary:      Effort: Pulmonary effort is normal. No respiratory distress.      Breath sounds: Normal breath sounds. No wheezing or rales.   Abdominal:      Palpations: Abdomen is soft.      Tenderness: There is no abdominal tenderness.   Musculoskeletal:      Cervical back: Neck supple.      Lumbar back: Tenderness present. No edema, signs of trauma or bony tenderness.      Left hip: Bony tenderness present.      Right lower leg: Edema (1+ bilat lower leg edema with chronic skin changes, no erythema/ulcers) present.      Left lower leg: Edema present.        Legs:    Lymphadenopathy:      Cervical: No cervical adenopathy.   Skin:     General: Skin is warm and dry.      Findings: No rash.   Neurological:      General: No focal deficit present.      Mental Status: She is alert and oriented to person, place, and time.      Gait: Gait abnormal (has difficulty rising from seated position, antalgic gait and leans onto rollator).   Psychiatric:         Mood and Affect: Mood normal.         Assessment:       1. JEFF (acute kidney injury)    2. Recurrent falls    3. Lumbar radiculopathy    4. Hypertensive kidney disease with stage 4 chronic kidney disease    5. Primary osteoarthritis involving multiple joints    6. Chronic heart failure with preserved ejection fraction    7. " Persistent atrial fibrillation    8. Mixed hyperlipidemia    9. Moderate aortic stenosis    10. Diarrhea, unspecified type         Plan:       1. JEFF (acute kidney injury)  -pt/family advised she was supposed to have f/u labs to monitor renal fxn after discharge  -pt believes  is scheduled to draw labs soon    2. Recurrent falls  -pt reports numerous falls and fell again while walking into the lobby after our visit was over. She went to sit in a chair and missed the chair falling onto her buttocks. She was unable to stand up so EMS was called and pt required max assist by several men to stand up. Normal rotation of hip on exam and no vertebral tenderness- she declined transport to ER.  -I had already already hip and back xrays d/t her recent pain c/o's so recommend they have those performed  -will send in order for WC to help with transports  -     WHEELCHAIR FOR HOME USE    3. Lumbar radiculopathy  -pt advised sounds like lumbar radicular pain she's describing but given falls and tenderness on exam would recommend lumbar and hip/pelvic xray to r/o fracture  -recommend she call to reschedule appt with Dr. Mari to see what non pharmacologic options are available to help manage her pain  -long discussion with pt and daughter regarding concerns with opioids meds given her frequent falls and possible over medication per daughter's report  -daughter does not want her taking pain medication if it can be avoided  -recommend tylenol ES BID and use topical pain relievers/pain patches  -can continue tramadol for now but again reviewed risks  -she is no longer taking gabapentin which was likely contributing to fall risk  -     X-Ray Hip 2 or 3 views Left with Pelvis when performed; Future; Expected date: 05/20/2025  -     X-Ray Lumbar Spine 5 View; Future; Expected date: 05/20/2025  -     WHEELCHAIR FOR HOME USE    4. Hypertensive kidney disease with stage 4 chronic kidney disease  -BP elevated today and she reports high  readings at home  -recommend increasing amlodipine to 2.5mg BID, will need to monitor for worsening leg edema but need to avoid ARB/ACE d/t renal fxn  -     amLODIPine (NORVASC) 2.5 MG tablet; Take 1 tablet (2.5 mg total) by mouth 2 (two) times daily.  Dispense: 180 tablet; Refill: 3    5. Primary osteoarthritis involving multiple joints  -     WHEELCHAIR FOR HOME USE    6. Chronic heart failure with preserved ejection fraction  -appears stable fluid wise on exam today    7. Persistent atrial fibrillation  -rate stable  -now on warfarin being monitored by [facility]    8. Mixed hyperlipidemia  -stable on last month's labs    9. Moderate aortic stenosis  -needs to get coronary calcium score rescheduled for Dr. Ghosh to eval for TAVR  -recommend scheduling f/u with Dr. Ghosh    10. Diarrhea, unspecified type  -requesting refill med for prn use but denies any current issues with diarrhea  -     diphenoxylate-atropine 2.5-0.025 mg (LOMOTIL) 2.5-0.025 mg per tablet; Take 1 tablet by mouth 2 (two) times daily as needed for Diarrhea.  Dispense: 30 tablet; Refill: 0      Follow up in about 3 months (around 8/20/2025).                 [1]   Current Outpatient Medications:     bumetanide (BUMEX) 1 MG tablet, Take 1 tablet (1 mg total) by mouth once daily., Disp: 90 tablet, Rfl: 4    albuterol-ipratropium (DUO-NEB) 2.5 mg-0.5 mg/3 mL nebulizer solution, Take 3 mLs by nebulization every 4 (four) hours as needed for Wheezing or Shortness of Breath. Rescue, Disp: 75 mL, Rfl: 0    amiodarone (PACERONE) 200 MG Tab, Take 0.5 tablets (100 mg total) by mouth once daily., Disp: 90 tablet, Rfl: 3    amLODIPine (NORVASC) 2.5 MG tablet, Take 1 tablet (2.5 mg total) by mouth 2 (two) times daily., Disp: 180 tablet, Rfl: 3    apixaban (ELIQUIS) 5 mg Tab, Take 1 tablet (5 mg total) by mouth 2 (two) times daily. (Patient not taking: Reported on 5/13/2025), Disp: 60 tablet, Rfl: 11    aspirin (ECOTRIN) 81 MG EC tablet, Take 1 tablet  (81 mg total) by mouth once daily., Disp: 30 tablet, Rfl: 0    budesonide-glycopyr-formoterol (BREZTRI AEROSPHERE) 160-9-4.8 mcg/actuation HFAA, Inhale 2 puffs into the lungs 2 (two) times a day., Disp: , Rfl:     buPROPion (WELLBUTRIN SR) 150 MG TBSR 12 hr tablet, Take 1 tablet (150 mg total) by mouth 2 (two) times daily., Disp: 180 tablet, Rfl: 3    diphenoxylate-atropine 2.5-0.025 mg (LOMOTIL) 2.5-0.025 mg per tablet, Take 1 tablet by mouth 2 (two) times daily as needed for Diarrhea., Disp: 30 tablet, Rfl: 0    FLUoxetine 40 MG capsule, Take 1 capsule (40 mg total) by mouth once daily., Disp: 90 capsule, Rfl: 3    iron polysaccharides (NIFEREX) 150 mg iron Cap, Take 1 capsule (150 mg total) by mouth once daily., Disp: 90 capsule, Rfl: 0    metoprolol succinate (TOPROL-XL) 50 MG 24 hr tablet, Take 1 tablet (50 mg total) by mouth 2 (two) times daily., Disp: 180 tablet, Rfl: 3    mirtazapine (REMERON) 15 MG tablet, Take 1 tablet (15 mg total) by mouth every evening. For sleep (Patient not taking: Reported on 5/13/2025), Disp: 30 tablet, Rfl: 3    mupirocin (BACTROBAN) 2 % ointment, Apply topically 2 (two) times daily. To infected skin tear, Disp: 22 g, Rfl: 1    potassium chloride SA (K-DUR,KLOR-CON M) 10 MEQ tablet, Take 1 tablet (10 mEq total) by mouth once daily., Disp: 30 tablet, Rfl: 5    traMADoL (ULTRAM) 50 mg tablet, Take 1 tablet (50 mg total) by mouth every 6 (six) hours as needed for Pain., Disp: 20 tablet, Rfl: 0    triamcinolone acetonide 0.1% (KENALOG) 0.1 % cream, Apply topically 2 (two) times daily., Disp: 60 g, Rfl: 2    warfarin (COUMADIN) 2.5 MG tablet, Take 1-1.5 Tablets QPM as instructed by coumadin clinic, Disp: 40 tablet, Rfl: 3  No current facility-administered medications for this visit.    Facility-Administered Medications Ordered in Other Visits:     lidocaine (PF) 10 mg/ml (1%) injection 5 mg, 0.5 mL, Intradermal, Once, Azeem Anderson MD

## 2025-05-21 ENCOUNTER — PATIENT MESSAGE (OUTPATIENT)
Dept: FAMILY MEDICINE | Facility: CLINIC | Age: 70
End: 2025-05-21
Payer: MEDICARE

## 2025-05-21 ENCOUNTER — DOCUMENT SCAN (OUTPATIENT)
Dept: HOME HEALTH SERVICES | Facility: HOSPITAL | Age: 70
End: 2025-05-21
Payer: MEDICARE

## 2025-05-21 NOTE — TELEPHONE ENCOUNTER
Please let pt/daughter know overall kidney function is stable- CR up slightly from discharge but still improved from initial hospital labs. Potassium level improved on recheck. I would recommend sending these labs to Dr. Deng also

## 2025-05-23 ENCOUNTER — DOCUMENT SCAN (OUTPATIENT)
Dept: HOME HEALTH SERVICES | Facility: HOSPITAL | Age: 70
End: 2025-05-23
Payer: MEDICARE

## 2025-05-30 ENCOUNTER — EXTERNAL HOME HEALTH (OUTPATIENT)
Dept: HOME HEALTH SERVICES | Facility: HOSPITAL | Age: 70
End: 2025-05-30
Payer: MEDICARE

## 2025-06-04 ENCOUNTER — TELEPHONE (OUTPATIENT)
Dept: FAMILY MEDICINE | Facility: CLINIC | Age: 70
End: 2025-06-04
Payer: MEDICARE

## 2025-06-05 ENCOUNTER — TELEPHONE (OUTPATIENT)
Dept: FAMILY MEDICINE | Facility: CLINIC | Age: 70
End: 2025-06-05

## 2025-06-09 ENCOUNTER — DOCUMENT SCAN (OUTPATIENT)
Dept: HOME HEALTH SERVICES | Facility: HOSPITAL | Age: 70
End: 2025-06-09
Payer: MEDICARE

## 2025-06-16 ENCOUNTER — LAB REQUISITION (OUTPATIENT)
Dept: LAB | Facility: HOSPITAL | Age: 70
End: 2025-06-16
Payer: MEDICARE

## 2025-06-16 DIAGNOSIS — N17.9 ACUTE KIDNEY FAILURE, UNSPECIFIED: ICD-10-CM

## 2025-06-16 LAB
ALBUMIN SERPL-MCNC: 3.5 G/DL (ref 3.5–5.2)
ANION GAP (SMH): 7 MMOL/L (ref 8–16)
BUN SERPL-MCNC: 20 MG/DL (ref 8–23)
CALCIUM SERPL-MCNC: 8.8 MG/DL (ref 8.7–10.5)
CHLORIDE SERPL-SCNC: 104 MMOL/L (ref 95–110)
CO2 SERPL-SCNC: 26 MMOL/L (ref 23–29)
CREAT SERPL-MCNC: 1.9 MG/DL (ref 0.5–1.4)
GFR SERPLBLD CREATININE-BSD FMLA CKD-EPI: 28 ML/MIN/1.73/M2
GLUCOSE SERPL-MCNC: 92 MG/DL (ref 70–110)
PHOSPHATE SERPL-MCNC: 3.6 MG/DL (ref 2.7–4.5)
POTASSIUM SERPL-SCNC: 3.9 MMOL/L (ref 3.5–5.1)
SODIUM SERPL-SCNC: 137 MMOL/L (ref 136–145)

## 2025-06-16 PROCEDURE — 80069 RENAL FUNCTION PANEL: CPT | Performed by: INTERNAL MEDICINE

## 2025-06-17 ENCOUNTER — DOCUMENT SCAN (OUTPATIENT)
Dept: HOME HEALTH SERVICES | Facility: HOSPITAL | Age: 70
End: 2025-06-17
Payer: MEDICARE

## 2025-06-18 NOTE — TELEPHONE ENCOUNTER
Romie GAMBOA Sony : 1979 Sex: male  Age: 46 y.o.    Chief Complaint   Patient presents with    Forms     FMLA; has been off work since 2025  Discuss returning to work        HPI  HPI:      Presents for follow-up with his wife.  Still having symptoms, still feels he cannot concentrate like he used to.  Feels a bit forgetful.  Had MRI/MRA showing no acute intracranial abnormality, showing mild chronic microvascular ischemic changes.  Saw neurology [facility] who recommended healthy blood pressure, healthy diet and exercise with proper sleep.  Eluded to screening for GERARDO which she is agreeable to, does admit to snoring.  No apneic spells that he is aware of.  No narcoleptic symptoms.  They recommended follow-up with the psychiatry.  He saw hematology for the leukocytosis, they feel possibly related to lithium/inflammation, did a number of blood tests and he is going to follow-up if abnormal.  Following with Dr. Zepeda, had EGD showing healed ulcers but still gastritis as well as colonoscopy, he will follow-up with them.  No longer on Carafate.  No other specific symptoms or complaints at this time.  No SI/HI.  Denies N/T/focal weakness SS/facial droop.  Complains of joint pain and stiffness  ROS: Negative other than as above      2025 blood work shows glucose 100 CMP otherwise normal TSH 1.79, white count 11.9 CBC otherwise grossly normal differential reviewed celiac negative urinalysis negative    MRI/MRA as above, CT abdomen pelvis and chest shows no acute abnormality, small right inguinal hernia borderline prostate enlargement mild degenerative changes.  Stable small right lung nodule and left upper lobe nodule with intramuscular lipoma.  Consider at least yearly CTs versus pulmonology    Current Outpatient Medications:     rosuvastatin (CRESTOR) 5 MG tablet, Take 1 tablet by mouth daily, Disp: , Rfl:     risperiDONE (RISPERDAL) 0.5 MG tablet, Take 2 tablets by mouth daily (Patient taking  Per   filled on 8/9/23    Ok to refill at lower dose?

## 2025-06-19 PROCEDURE — G0179 MD RECERTIFICATION HHA PT: HCPCS | Mod: ,,, | Performed by: FAMILY MEDICINE

## 2025-06-24 ENCOUNTER — EXTERNAL HOME HEALTH (OUTPATIENT)
Dept: HOME HEALTH SERVICES | Facility: HOSPITAL | Age: 70
End: 2025-06-24
Payer: MEDICARE

## 2025-06-24 ENCOUNTER — DOCUMENT SCAN (OUTPATIENT)
Dept: HOME HEALTH SERVICES | Facility: HOSPITAL | Age: 70
End: 2025-06-24
Payer: MEDICARE

## 2025-06-28 ENCOUNTER — DOCUMENT SCAN (OUTPATIENT)
Dept: HOME HEALTH SERVICES | Facility: HOSPITAL | Age: 70
End: 2025-06-28
Payer: MEDICARE

## 2025-07-02 ENCOUNTER — TELEPHONE (OUTPATIENT)
Dept: FAMILY MEDICINE | Facility: CLINIC | Age: 70
End: 2025-07-02
Payer: MEDICARE

## 2025-07-02 DIAGNOSIS — F33.0 MILD EPISODE OF RECURRENT MAJOR DEPRESSIVE DISORDER: ICD-10-CM

## 2025-07-02 NOTE — TELEPHONE ENCOUNTER
----- Message from Chioma sent at 7/2/2025 10:23 AM CDT -----  Contact: rahul Garcia with [facility]  calling to refill patient fluoxetine ,mirtazapine,   [facility]- [address]   [phone]

## 2025-07-07 ENCOUNTER — TELEPHONE (OUTPATIENT)
Dept: FAMILY MEDICINE | Facility: CLINIC | Age: 70
End: 2025-07-07
Payer: MEDICARE

## 2025-07-07 NOTE — TELEPHONE ENCOUNTER
----- Message from Aneesh Fajardo sent at 7/7/2025 11:31 AM CDT -----  Contact: [facility]    ----- Message -----  From: Roselyn Abarca  Sent: 7/7/2025  11:28 AM CDT  To: [facility] Staff    Pt has had multiple falls and needs to be evaluated. [facility] #[phone]

## 2025-07-07 NOTE — TELEPHONE ENCOUNTER
Took her husbands gabapentin, a half, and thinks that made her fall, but home health nurse reports a few, denied head trauma, said she just slid down from sofa to floor. Will not take any more of that med. Will come in in AM

## 2025-07-07 NOTE — TELEPHONE ENCOUNTER
----- Message from Aneesh Fajardo sent at 7/7/2025 12:03 PM CDT -----    ----- Message -----  From: Chioma Diaz  Sent: 7/7/2025  11:59 AM CDT  To: [facility] Staff    Returning a phone call   [phone]

## 2025-07-08 ENCOUNTER — OFFICE VISIT (OUTPATIENT)
Dept: FAMILY MEDICINE | Facility: CLINIC | Age: 70
End: 2025-07-08
Payer: MEDICARE

## 2025-07-08 ENCOUNTER — DOCUMENT SCAN (OUTPATIENT)
Dept: HOME HEALTH SERVICES | Facility: HOSPITAL | Age: 70
End: 2025-07-08
Payer: MEDICARE

## 2025-07-08 VITALS
WEIGHT: 214.63 LBS | OXYGEN SATURATION: 97 % | HEIGHT: 63 IN | BODY MASS INDEX: 38.03 KG/M2 | DIASTOLIC BLOOD PRESSURE: 88 MMHG | SYSTOLIC BLOOD PRESSURE: 138 MMHG | HEART RATE: 66 BPM

## 2025-07-08 DIAGNOSIS — R29.6 RECURRENT FALLS: Primary | ICD-10-CM

## 2025-07-08 DIAGNOSIS — I12.9 HYPERTENSIVE KIDNEY DISEASE WITH STAGE 4 CHRONIC KIDNEY DISEASE: ICD-10-CM

## 2025-07-08 DIAGNOSIS — J44.9 CHRONIC OBSTRUCTIVE PULMONARY DISEASE, UNSPECIFIED COPD TYPE: ICD-10-CM

## 2025-07-08 DIAGNOSIS — R44.3 HALLUCINATION: ICD-10-CM

## 2025-07-08 DIAGNOSIS — M15.0 PRIMARY OSTEOARTHRITIS INVOLVING MULTIPLE JOINTS: ICD-10-CM

## 2025-07-08 DIAGNOSIS — N18.32 STAGE 3B CHRONIC KIDNEY DISEASE: ICD-10-CM

## 2025-07-08 DIAGNOSIS — N18.4 HYPERTENSIVE KIDNEY DISEASE WITH STAGE 4 CHRONIC KIDNEY DISEASE: ICD-10-CM

## 2025-07-08 DIAGNOSIS — I25.10 CORONARY ARTERY DISEASE INVOLVING NATIVE CORONARY ARTERY OF NATIVE HEART WITHOUT ANGINA PECTORIS: ICD-10-CM

## 2025-07-08 DIAGNOSIS — M54.16 LUMBAR RADICULOPATHY: ICD-10-CM

## 2025-07-08 DIAGNOSIS — I50.32 CHRONIC HEART FAILURE WITH PRESERVED EJECTION FRACTION: ICD-10-CM

## 2025-07-08 DIAGNOSIS — F51.01 PRIMARY INSOMNIA: ICD-10-CM

## 2025-07-08 DIAGNOSIS — F33.0 MILD EPISODE OF RECURRENT MAJOR DEPRESSIVE DISORDER: ICD-10-CM

## 2025-07-08 DIAGNOSIS — I48.19 PERSISTENT ATRIAL FIBRILLATION: ICD-10-CM

## 2025-07-08 PROCEDURE — 1125F AMNT PAIN NOTED PAIN PRSNT: CPT | Mod: CPTII,S$GLB,, | Performed by: NURSE PRACTITIONER

## 2025-07-08 PROCEDURE — 1159F MED LIST DOCD IN RCRD: CPT | Mod: CPTII,S$GLB,, | Performed by: NURSE PRACTITIONER

## 2025-07-08 PROCEDURE — 1100F PTFALLS ASSESS-DOCD GE2>/YR: CPT | Mod: CPTII,S$GLB,, | Performed by: NURSE PRACTITIONER

## 2025-07-08 PROCEDURE — 3008F BODY MASS INDEX DOCD: CPT | Mod: CPTII,S$GLB,, | Performed by: NURSE PRACTITIONER

## 2025-07-08 PROCEDURE — 99214 OFFICE O/P EST MOD 30 MIN: CPT | Mod: S$GLB,,, | Performed by: NURSE PRACTITIONER

## 2025-07-08 PROCEDURE — 3075F SYST BP GE 130 - 139MM HG: CPT | Mod: CPTII,S$GLB,, | Performed by: NURSE PRACTITIONER

## 2025-07-08 PROCEDURE — 3288F FALL RISK ASSESSMENT DOCD: CPT | Mod: CPTII,S$GLB,, | Performed by: NURSE PRACTITIONER

## 2025-07-08 PROCEDURE — 1160F RVW MEDS BY RX/DR IN RCRD: CPT | Mod: CPTII,S$GLB,, | Performed by: NURSE PRACTITIONER

## 2025-07-08 PROCEDURE — G2211 COMPLEX E/M VISIT ADD ON: HCPCS | Mod: S$GLB,,, | Performed by: NURSE PRACTITIONER

## 2025-07-08 PROCEDURE — 3079F DIAST BP 80-89 MM HG: CPT | Mod: CPTII,S$GLB,, | Performed by: NURSE PRACTITIONER

## 2025-07-08 RX ORDER — IPRATROPIUM BROMIDE AND ALBUTEROL SULFATE 2.5; .5 MG/3ML; MG/3ML
3 SOLUTION RESPIRATORY (INHALATION) EVERY 4 HOURS PRN
Qty: 120 ML | Refills: 5 | Status: SHIPPED | OUTPATIENT
Start: 2025-07-08 | End: 2026-07-08

## 2025-07-08 RX ORDER — MIRTAZAPINE 15 MG/1
15 TABLET, FILM COATED ORAL NIGHTLY
Qty: 30 TABLET | Refills: 3 | Status: SHIPPED | OUTPATIENT
Start: 2025-07-08 | End: 2026-07-08

## 2025-07-08 RX ORDER — BUMETANIDE 1 MG/1
1 TABLET ORAL DAILY
Start: 2025-07-08 | End: 2026-07-08

## 2025-07-08 RX ORDER — CALCITRIOL 0.25 UG/1
0.25 CAPSULE ORAL DAILY
COMMUNITY

## 2025-07-08 RX ORDER — FLUOXETINE HYDROCHLORIDE 40 MG/1
40 CAPSULE ORAL DAILY
Qty: 90 CAPSULE | Refills: 3 | Status: SHIPPED | OUTPATIENT
Start: 2025-07-08

## 2025-07-08 RX ORDER — BUPROPION HYDROCHLORIDE 150 MG/1
150 TABLET, EXTENDED RELEASE ORAL 2 TIMES DAILY
Qty: 180 TABLET | Refills: 3 | Status: SHIPPED | OUTPATIENT
Start: 2025-07-08 | End: 2026-07-08

## 2025-07-08 RX ORDER — ASPIRIN 325 MG
50000 TABLET, DELAYED RELEASE (ENTERIC COATED) ORAL
COMMUNITY
Start: 2025-06-06

## 2025-07-08 RX ORDER — LANOLIN ALCOHOL/MO/W.PET/CERES
1 CREAM (GRAM) TOPICAL DAILY
COMMUNITY
Start: 2025-06-09

## 2025-07-08 RX ORDER — FUROSEMIDE 40 MG/1
40 TABLET ORAL DAILY
COMMUNITY
End: 2025-07-08

## 2025-07-08 NOTE — PROGRESS NOTES
SUBJECTIVE:    Patient ID: Iris Mueller is a 69 y.o. female.    Chief Complaint: Fall (Bottles brought//Pt would like to discuss her recurrent falls.States that she feels like she does not have good balance. Left side hip pain that radiates down the legs x 4 weeks//Pt would like to discuss ordering mammo//JAIME )      History of Present Illness    Patient presents today for recurrent falls.  Patient here with her niece today, her daughter Barbara is currently out of town    Patient has experienced 3 falls recently.  She states she was having worsening back and left leg pain so she took 1 of her 's muscle relaxers on Friday.  Saturday early morning hours 1:00 a.m. and 4:00 a.m. she had 2 falls while getting out of bed.  One fall she did strike right side of her face on the ground and has now developed a black eye. She had a 3rd fall on Sunday while wearing hospital socks.  Her niece was helping her up when her feet slipped out from underneath her and she landed on her buttocks.  She is ambulating with a walker in the house though knee states she will often get her feet tangled underneath her.  This is usually the cause of her falls and she denies presyncope or syncope.  After striking her head with 1 of the fall she denies any speech or vision change, no change in her chronic headache pattern.  Opioid medication and gabapentin has been previously stopped due to recurrent falls.    She continues with chronic lower back pain and left hip/leg pain.  She when she was seen here in May after hospital stay I had ordered lumbar and left hip x-rays though she had not had done.  She had also been referred to Dr. Mari PMR for further evaluation if her pain complaints but has not scheduled appointment    Niece report she has been having episodes of confusion, family not sure if she is dreaming or hallucinating.  She called family stating there was a family of cats living on the couch and when they came over  there were no cats present. Niece asking if there is any medication that can be stopped    She experiences significant dyspnea when walking short distances (approximately 15 feet from chair to bathroom).  She uses Breztri inhaler (two puffs twice daily) and has a nebulizer medication on her list though patient/niece state she does not have nebulizer machine at home.  She has follow-up scheduled with Dr. Ruiz next week    She has a history of CHF as well as currently taking Bumex 1 mg daily and then using furosemide 40 mg for worsening leg swelling or weight gain.  She has previously seen kylie Wallis for moderate to severe AS.  She has not scheduled follow-up with him since her hospital stay.  She is on warfarin managed by [facility]    She reports weight gain of 7 lbs since last visit in May, monitoring weight daily. She has been instructed to take medication if weight increases more than 2 lbs.    Follows with Dr. Deng for CKD, believes she has a upcoming appointment      ROS:  General: no fever, no chills, no fatigue, + weight gain, no weight loss  Eyes: no vision changes, no redness, no discharge  ENT: no ear pain, no nasal congestion, no sore throat  Cardiovascular: no chest pain, no palpitations, no lower extremity edema  Respiratory: no cough, complains of shortness of breath, complains of wheezing, complains of exertional dyspnea  Gastrointestinal: no abdominal pain, no nausea, no vomiting, complains of diarrhea, no constipation, no blood in stool  Genitourinary: no dysuria, no hematuria, no frequency  Musculoskeletal: complains of joint pain, no muscle pain, complains of limb pain  Skin: no rash, no lesion  Neurological: complains of headache- mild/chronic, no dizziness, no numbness, no tingling, complains of falling, complains of confusion or disorientation, complains of excessive drowsiness  Psychiatric: no anxiety, no depression, no sleep difficulty, complains of hallucinations               Anti-coag visit on 07/02/2025   Component Date Value Ref Range Status    INR 07/02/2025 3.1   Final   Anti-coag visit on 06/27/2025   Component Date Value Ref Range Status    INR 06/27/2025 3.1   Final   Anti-coag visit on 06/23/2025   Component Date Value Ref Range Status    INR 06/23/2025 2.3   Final   Anti-coag visit on 06/20/2025   Component Date Value Ref Range Status    INR 06/20/2025 1.4   Final   Lab Requisition on 06/16/2025   Component Date Value Ref Range Status    Sodium 06/16/2025 137  136 - 145 mmol/L Final    Potassium 06/16/2025 3.9  3.5 - 5.1 mmol/L Final    Chloride 06/16/2025 104  95 - 110 mmol/L Final    CO2 06/16/2025 26  23 - 29 mmol/L Final    Glucose 06/16/2025 92  70 - 110 mg/dL Final    BUN 06/16/2025 20  8 - 23 mg/dL Final    Creatinine 06/16/2025 1.9 (H)  0.5 - 1.4 mg/dL Final    Calcium 06/16/2025 8.8  8.7 - 10.5 mg/dL Final    Albumin 06/16/2025 3.5  3.5 - 5.2 g/dL Final    Phosphorus Level 06/16/2025 3.6  2.7 - 4.5 mg/dL Final    Anion Gap 06/16/2025 7 (L)  8 - 16 mmol/L Final    eGFR 06/16/2025 28 (L)  >60 mL/min/1.73/m2 Final   Anti-coag visit on 06/13/2025   Component Date Value Ref Range Status    INR 06/13/2025 1.7   Final   Anti-coag visit on 06/09/2025   Component Date Value Ref Range Status    INR 06/09/2025 2.0   Final   Anti-coag visit on 06/06/2025   Component Date Value Ref Range Status    INR 06/06/2025 2.2   Final   Anti-coag visit on 06/02/2025   Component Date Value Ref Range Status    INR 06/02/2025 2.8   Final   Anti-coag visit on 05/30/2025   Component Date Value Ref Range Status    INR 05/30/2025 1.7   Final   There may be more visits with results that are not included.       Past Medical History:   Diagnosis Date    Anemia, unspecified     Arthritis     Asthma     COPD (chronic obstructive pulmonary disease)     Encounter for blood transfusion     Hypertension     Obese body habitus     Wound infection 08/2019    Right knee     Past Surgical History:    Procedure Laterality Date    ANGIOGRAM, CORONARY, WITH LEFT HEART CATHETERIZATION N/A 2022    Procedure: Angiogram, Coronary, with Left Heart Cath;  Surgeon: Jorge Tran MD;  Location: [facility] CATH/EP LAB;  Service: Cardiology;  Laterality: N/A;     SECTION      ECHOCARDIOGRAM,TRANSESOPHAGEAL N/A 2023    Procedure: Transesophageal echo (MARIANO) intra-procedure log documentation;  Surgeon: Provider, Dosc Diagnostic;  Location: [facility] EP LAB;  Service: Cardiology;  Laterality: N/A;    INCISION AND DRAINAGE OF HEMATOMA Left 2024    Procedure: INCISION AND DRAINAGE, HEMATOMA;  Surgeon: Bryson Huerta MD;  Location: [facility] OR;  Service: Orthopedics;  Laterality: Left;    JOINT REPLACEMENT      Knee    KNEE ARTHROPLASTY Right 2019    Procedure: ARTHROPLASTY, KNEE;  Surgeon: Delio Chung MD;  Location: [facility] OR;  Service: Orthopedics;  Laterality: Right;  anesthesia:  general and block    REPLACEMENT OF WOUND VACUUM-ASSISTED CLOSURE DEVICE Right 2019    Procedure: REPLACEMENT, WOUND VAC;  Surgeon: Delio Chung MD;  Location: [facility] OR;  Service: Orthopedics;  Laterality: Right;    TONSILLECTOMY      TREATMENT OF CARDIAC ARRHYTHMIA N/A 2023    Procedure: Cardioversion or Defibrillation;  Surgeon: PJ Betancourt MD;  Location: [facility] EP LAB;  Service: Cardiology;  Laterality: N/A;  AF, MARIANO, DCCV, MAC, EH, 3 Prep    VENTRICULOGRAM, LEFT  2022    Procedure: Ventriculogram, Left;  Surgeon: Jorge Tran MD;  Location: [facility] CATH/EP LAB;  Service: Cardiology;;     Family History   Problem Relation Name Age of Onset    Alzheimer's disease Mother         All of your core healthy metrics are met.      The CVD Risk score (D'Agostino, et al., 2008) failed to calculate for the following reasons:    The patient has a prior MI, stroke, CHF, or peripheral vascular disease diagnosis     Marital Status:   Alcohol History:  reports current alcohol use.  Tobacco History:   "reports that she quit smoking about 6 months ago. Her smoking use included cigarettes. She started smoking about 50 years ago. She has a 23.7 pack-year smoking history. She has been exposed to tobacco smoke. She has never used smokeless tobacco.  Drug History:  reports no history of drug use.    Health Maintenance Topics with due status: Not Due       Topic Last Completion Date    DEXA Scan 12/05/2023    TETANUS VACCINE 12/08/2024    Influenza Vaccine 01/06/2025    Colorectal Cancer Screening 02/01/2025    Lipid Panel 04/22/2025     Immunization History   Administered Date(s) Administered    COVID-19 MRNA, LN-S PF (MODERNA HALF 0.25 ML DOSE) 12/14/2021    COVID-19, MRNA, LN-S, PF (MODERNA FULL 0.5 ML DOSE) 02/26/2021, 03/26/2021    Influenza (FLUAD) - Quadrivalent - Adjuvanted - PF *Preferred* (65+) 11/09/2023    Influenza - Quadrivalent - High Dose - PF (65 years and older) 08/20/2020, 10/25/2021, 11/08/2022    Influenza - Trivalent - Fluad - Adjuvanted - PF (65 years and older 01/06/2025    Influenza - Trivalent - Fluzone High Dose - PF (65 years and older) 09/18/2019    PPD Test 09/04/2019, 02/09/2024, 12/09/2024, 01/24/2025    Pneumococcal Conjugate - 13 Valent 03/06/2019    Pneumococcal Polysaccharide - 23 Valent 07/21/2020    Tdap 09/18/2019, 12/08/2024    Tuberculin 12/09/2024, 12/19/2024, 01/24/2025, 01/31/2025       Review of patient's allergies indicates:  No Known Allergies  Current Medications[1]        Objective:      Vitals:    07/08/25 0812   BP: 138/88   Pulse: 66   SpO2: 97%   Weight: 97.3 kg (214 lb 9.6 oz)   Height: 5' 3" (1.6 m)       Physical Exam  Vitals and nursing note reviewed.   Constitutional:       General: She is not in acute distress.     Appearance: She is well-developed.      Comments: Morbidly obese white female, appears older than stated age, ambulating with rollator   HENT:      Head: Normocephalic.        Right Ear: Tympanic membrane and ear canal normal.      Left Ear: Tympanic " membrane and ear canal normal.   Eyes:      Extraocular Movements: Extraocular movements intact.   Neck:      Vascular: No carotid bruit.   Cardiovascular:      Rate and Rhythm: Normal rate. Rhythm irregular.      Heart sounds: Murmur (2nd ICS RSB) heard.      Systolic murmur is present with a grade of 3/6.   Pulmonary:      Effort: Pulmonary effort is normal. No respiratory distress.      Breath sounds: No wheezing or rales.      Comments: Diminished throughout otherwise clear  Abdominal:      Palpations: Abdomen is soft.      Tenderness: There is no abdominal tenderness.   Musculoskeletal:      Cervical back: Neck supple.      Right lower leg: Edema (trace bilat lower leg/ankle edema with chronic skin changes, no erythema/ulcers) present.      Left lower leg: Edema present.   Lymphadenopathy:      Cervical: No cervical adenopathy.   Skin:     General: Skin is warm and dry.      Findings: No rash.   Neurological:      General: No focal deficit present.      Mental Status: She is alert and oriented to person, place, and time.      Gait: Gait abnormal (has difficulty rising from seated position, antalgic gait and leans onto rollator).   Psychiatric:         Mood and Affect: Mood normal.          Assessment:       1. Recurrent falls    2. Lumbar radiculopathy    3. Primary osteoarthritis involving multiple joints    4. Hypertensive kidney disease with stage 4 chronic kidney disease    5. Stage 3b chronic kidney disease    6. Hallucination    7. Chronic heart failure with preserved ejection fraction    8. Primary insomnia    9. Mild episode of recurrent major depressive disorder    10. Chronic obstructive pulmonary disease, unspecified COPD type    11. Coronary artery disease involving native coronary artery of native heart without angina pectoris    12. Persistent atrial fibrillation           Assessment & Plan    RECURRENT FALLS:  - Assessed patient's recent falls, including two on Friday (1:00 AM and 4:00 AM) and  one on Sunday.  - Falls appear to be related to recent muscle relaxant use and mobility issues, particularly when turning or crossing one foot over the other.  - Explained risks of falls associated with muscle relaxers, gabapentin, and stronger pain medications.  -would recommend outpatient physical therapy though patient/family state she does not have anyone to bring her to therapy several times a week  - Ordered physical therapy at home twice weekly to address balance and movement issues.  - Advised the patient to use shower chair when bathing to prevent falls.  - Ordered wheelchair through medical supply company (follow-up on previous order from May 20th).    LUMBAR RADICULOPATHY  -ongoing lower back pain with radiation down left leg  -given she is complaining of worsening back pain since her fall in May again advised I would recommend updated x-rays  - Referred to Dr. Mari (physical medicine and rehabilitation) for evaluation of back pain and potential injections.    PRIMARY OSTEOARTHRITIS INVOLVING MULTIPLE JOINTS  - Patient reports hip pain, particularly after falls.  - Recommend re-X-ray of the hip to check for possible fracture.  - Referred to Dr. Mari (physical medicine and rehabilitation) for evaluation of hip pain and potential injections.    HYPERTENSIVE CKD WITH STAGE 3B CKD  -BP stable    STAGE IIIB CKD  -updated labs ordered  -has follow-up soon with Dr. Deng    HALLUCINATION  - Patient experiences confusion and disorientation, confusing dreams with reality, such as believing there were cats in the couch.  - Discussed the need to check kidney functions and ammonia levels due to disorientation.  - Ordered lab work, including ammonia level (may require hospital visit if home health unable to process).    CHRONIC HEART FAILURE WITH PRESERVED EJECTION FRACTION  -- Increased bumetanide to 2 tablets daily for 3 days, then return to 1 tablet daily, with instructions to take an extra dose if weight  increases by 3 lbs in 1 day 5lbs/week  -stop furosemide    PRIMARY INSOMNIA  -stable on mirtazapine, we did review the sedating effects of mirtazapine though she feels she needs something to help her sleep    MILD EPISODE OF RECURRENT MAJOR DEPRESSIVE DISORDER  -stable    COPD  -will send order for home nebulizer and albuterol p.r.n.  -continue with Breztri  -has follow-up Dr. Ruiz next week    CAD WITHOUT ANGINA  -stable, denies angina    AORTIC STENOSIS  -recommend follow-up Dr. Ghosh    PERSISTENT AFIB  -rate stable  -cautioned given warfarin if she has head trauma needs CT scan of head    FOLLOW-UP:  - Follow up on in August as scheduled       Plan:       1. Recurrent falls  -     Ambulatory referral/consult to Home Health; Future; Expected date: 07/09/2025    2. Lumbar radiculopathy    3. Primary osteoarthritis involving multiple joints    4. Hypertensive kidney disease with stage 4 chronic kidney disease  -     bumetanide (BUMEX) 1 MG tablet; Take 1 tablet (1 mg total) by mouth once daily. Take extra dose for weight gain >3 lbs overnight or >5lbs in a week    5. Stage 3b chronic kidney disease  -     CBC Auto Differential; Future; Expected date: 07/08/2025  -     Comprehensive Metabolic Panel; Future; Expected date: 07/08/2025    6. Hallucination  -     Ammonia; Future; Expected date: 07/08/2025    7. Chronic heart failure with preserved ejection fraction  -     CBC Auto Differential; Future; Expected date: 07/08/2025  -     Comprehensive Metabolic Panel; Future; Expected date: 07/08/2025  -     BNP; Future; Expected date: 07/08/2025  -     bumetanide (BUMEX) 1 MG tablet; Take 1 tablet (1 mg total) by mouth once daily. Take extra dose for weight gain >3 lbs overnight or >5lbs in a week    8. Primary insomnia  -     mirtazapine (REMERON) 15 MG tablet; Take 1 tablet (15 mg total) by mouth every evening. For sleep  Dispense: 30 tablet; Refill: 3    9. Mild episode of recurrent major depressive disorder  -      FLUoxetine 40 MG capsule; Take 1 capsule (40 mg total) by mouth once daily.  Dispense: 90 capsule; Refill: 3  -     buPROPion (WELLBUTRIN SR) 150 MG TBSR 12 hr tablet; Take 1 tablet (150 mg total) by mouth 2 (two) times daily.  Dispense: 180 tablet; Refill: 3    10. Chronic obstructive pulmonary disease, unspecified COPD type  -     NEBULIZER FOR HOME USE  -     albuterol-ipratropium (DUO-NEB) 2.5 mg-0.5 mg/3 mL nebulizer solution; Take 3 mLs by nebulization every 4 (four) hours as needed for Wheezing or Shortness of Breath. Rescue  Dispense: 120 mL; Refill: 5    11. Coronary artery disease involving native coronary artery of native heart without angina pectoris  Overview:  12/22   moderate CAD which was done for low EF.       12. Persistent atrial fibrillation      Follow up for as scheduled.          Counseled on age and gender appropriate medical preventative services, including cancer screenings, immunizations, overall nutritional health, need for a consistent exercise regimen and an overall push towards maintaining a vigorous and active lifestyle.      This note was generated with the assistance of ambient listening technology. Verbal consent was obtained by the patient and accompanying visitor(s) for the recording of patient appointment to facilitate this note. I attest to having reviewed and edited the generated note for accuracy, though some syntax or spelling errors may persist. Please contact the author of this note for any clarification.       7/8/2025 Roselyn Cote NP           [1]   Current Outpatient Medications:     amiodarone (PACERONE) 200 MG Tab, Take 0.5 tablets (100 mg total) by mouth once daily., Disp: 90 tablet, Rfl: 3    amLODIPine (NORVASC) 2.5 MG tablet, Take 1 tablet (2.5 mg total) by mouth 2 (two) times daily., Disp: 180 tablet, Rfl: 3    budesonide-glycopyr-formoterol (BREZTRI AEROSPHERE) 160-9-4.8 mcg/actuation HFAA, Inhale 2 puffs into the lungs 2 (two) times a day., Disp: , Rfl:      calcitRIOL (ROCALTROL) 0.25 MCG Cap, Take 0.25 mcg by mouth once daily., Disp: , Rfl:     cholecalciferol, vitamin D3, 1,250 mcg (50,000 unit) capsule, Take 50,000 Units by mouth every 7 days., Disp: , Rfl:     iron polysaccharides (NIFEREX) 150 mg iron Cap, Take 1 capsule (150 mg total) by mouth once daily., Disp: 90 capsule, Rfl: 0    magnesium oxide (MAG-OX) 400 mg (241.3 mg magnesium) tablet, Take 1 tablet by mouth once daily., Disp: , Rfl:     metoprolol succinate (TOPROL-XL) 50 MG 24 hr tablet, Take 1 tablet (50 mg total) by mouth 2 (two) times daily., Disp: 180 tablet, Rfl: 3    mupirocin (BACTROBAN) 2 % ointment, Apply topically 2 (two) times daily. To infected skin tear, Disp: 22 g, Rfl: 1    triamcinolone acetonide 0.1% (KENALOG) 0.1 % cream, Apply topically 2 (two) times daily., Disp: 60 g, Rfl: 2    warfarin (COUMADIN) 2.5 MG tablet, Take 1-1.5 Tablets QPM as instructed by coumadin clinic, Disp: 40 tablet, Rfl: 3    albuterol-ipratropium (DUO-NEB) 2.5 mg-0.5 mg/3 mL nebulizer solution, Take 3 mLs by nebulization every 4 (four) hours as needed for Wheezing or Shortness of Breath. Rescue, Disp: 120 mL, Rfl: 5    aspirin (ECOTRIN) 81 MG EC tablet, Take 1 tablet (81 mg total) by mouth once daily., Disp: 30 tablet, Rfl: 0    bumetanide (BUMEX) 1 MG tablet, Take 1 tablet (1 mg total) by mouth once daily. Take extra dose for weight gain >3 lbs overnight or >5lbs in a week, Disp: , Rfl:     buPROPion (WELLBUTRIN SR) 150 MG TBSR 12 hr tablet, Take 1 tablet (150 mg total) by mouth 2 (two) times daily., Disp: 180 tablet, Rfl: 3    FLUoxetine 40 MG capsule, Take 1 capsule (40 mg total) by mouth once daily., Disp: 90 capsule, Rfl: 3    mirtazapine (REMERON) 15 MG tablet, Take 1 tablet (15 mg total) by mouth every evening. For sleep, Disp: 30 tablet, Rfl: 3    potassium chloride SA (K-DUR,KLOR-CON M) 10 MEQ tablet, Take 1 tablet (10 mEq total) by mouth once daily. (Patient not taking: Reported on 7/8/2025), Disp: 30  tablet, Rfl: 5    traMADoL (ULTRAM) 50 mg tablet, Take 1 tablet (50 mg total) by mouth every 6 (six) hours as needed for Pain. (Patient not taking: Reported on 7/8/2025), Disp: 20 tablet, Rfl: 0  No current facility-administered medications for this visit.    Facility-Administered Medications Ordered in Other Visits:     lidocaine (PF) 10 mg/ml (1%) injection 5 mg, 0.5 mL, Intradermal, Once, Azeem Anderson MD

## 2025-07-10 ENCOUNTER — DOCUMENT SCAN (OUTPATIENT)
Dept: HOME HEALTH SERVICES | Facility: HOSPITAL | Age: 70
End: 2025-07-10
Payer: MEDICARE

## 2025-07-14 ENCOUNTER — OFFICE VISIT (OUTPATIENT)
Dept: PULMONOLOGY | Facility: CLINIC | Age: 70
End: 2025-07-14
Payer: MEDICARE

## 2025-07-14 VITALS
SYSTOLIC BLOOD PRESSURE: 125 MMHG | HEIGHT: 63 IN | DIASTOLIC BLOOD PRESSURE: 81 MMHG | HEART RATE: 72 BPM | OXYGEN SATURATION: 93 % | BODY MASS INDEX: 38.01 KG/M2

## 2025-07-14 DIAGNOSIS — Z79.899 LONG TERM CURRENT USE OF AMIODARONE: ICD-10-CM

## 2025-07-14 DIAGNOSIS — N17.9 AKI (ACUTE KIDNEY INJURY): ICD-10-CM
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
## 2025-07-14 DIAGNOSIS — J44.1 CHRONIC OBSTRUCTIVE PULMONARY DISEASE WITH ACUTE EXACERBATION: ICD-10-CM

## 2025-07-14 DIAGNOSIS — J82.83 EOSINOPHILIC ASTHMA: ICD-10-CM

## 2025-07-14 DIAGNOSIS — J44.89 ASTHMA-COPD OVERLAP SYNDROME: ICD-10-CM

## 2025-07-14 DIAGNOSIS — R53.81 DEBILITY: ICD-10-CM

## 2025-07-14 DIAGNOSIS — J45.50 SEVERE PERSISTENT ASTHMA WITHOUT COMPLICATION: Primary | ICD-10-CM

## 2025-07-14 PROCEDURE — 3079F DIAST BP 80-89 MM HG: CPT | Mod: CPTII,S$GLB,, | Performed by: INTERNAL MEDICINE

## 2025-07-14 PROCEDURE — 99999 PR PBB SHADOW E&M-EST. PATIENT-LVL V: CPT | Mod: PBBFAC,,, | Performed by: INTERNAL MEDICINE

## 2025-07-14 PROCEDURE — 1159F MED LIST DOCD IN RCRD: CPT | Mod: CPTII,S$GLB,, | Performed by: INTERNAL MEDICINE

## 2025-07-14 PROCEDURE — 3288F FALL RISK ASSESSMENT DOCD: CPT | Mod: CPTII,S$GLB,, | Performed by: INTERNAL MEDICINE

## 2025-07-14 PROCEDURE — 99203 OFFICE O/P NEW LOW 30 MIN: CPT | Mod: S$GLB,,, | Performed by: INTERNAL MEDICINE

## 2025-07-14 PROCEDURE — 1100F PTFALLS ASSESS-DOCD GE2>/YR: CPT | Mod: CPTII,S$GLB,, | Performed by: INTERNAL MEDICINE

## 2025-07-14 PROCEDURE — 3008F BODY MASS INDEX DOCD: CPT | Mod: CPTII,S$GLB,, | Performed by: INTERNAL MEDICINE

## 2025-07-14 PROCEDURE — 3074F SYST BP LT 130 MM HG: CPT | Mod: CPTII,S$GLB,, | Performed by: INTERNAL MEDICINE

## 2025-07-14 RX ORDER — WARFARIN SODIUM 5 MG/1
5 TABLET ORAL
COMMUNITY
Start: 2025-06-23

## 2025-07-14 RX ORDER — PREDNISONE 20 MG/1
TABLET ORAL
Qty: 20 TABLET | Refills: 1 | Status: SHIPPED | OUTPATIENT
Start: 2025-07-14

## 2025-07-14 NOTE — PROGRESS NOTES
7/14/2025    Iris Glasogw Ami  New Patient Consult    Chief Complaint   Patient presents with    COPD       HPI:       7/14/2025 pt had lung problems all life.  Smokes -- 1/4 ppd.   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  Had freq cough and wheezes and sob.   Uses breztri daily.  Worsens at night -- no recent prednisone.       Pt had h/o as child with cough/wheezes and head colds into chest, pt never remitted but felt better over time but still werner with cough and noct worsening.       Uses albuterol rescue 5 times weekly.  No home oxygen.       No diabetes-- but has snoring and apneas with no sleep study-- has excess sleepy with naps daily    Falls nearly once weekly --home health -- has severe AS...     Has severe AS--Result Date: 12/8/2024    Left Ventricle: The left ventricle is normal in size. Moderately   increased wall thickness. Unable to assess wall motion. There is normal   systolic function with a visually estimated ejection fraction of 55 - 60%.   Unable to assess diastolic function due to atrial fibrillation.    Right Ventricle: Right ventricle was not well visualized due to poor   acoustic window.    Left Atrium: Left atrium is severely dilated.    Right Atrium: Right atrium is severely dilated.    Aortic Valve: There is moderate to severe stenosis. Aortic valve area   by VTI is 1.0 cm². Aortic valve peak velocity is 3.4 m/s. Mean gradient is   27.0 mmHg. The dimensionless index is 0.32. There is mild aortic   regurgitation.    IVC/SVC: Elevated venous pressure at 15 mmHg.       Sees Dr Ghosh and was to have ct for jocelin eval for tavr     PFSH:  Past Medical History:   Diagnosis Date    Anemia, unspecified     Arthritis     Asthma     COPD (chronic obstructive pulmonary disease)     Encounter for blood transfusion     Hypertension     Obese body habitus     Wound infection 08/2019    Right knee         Past Surgical History:   Procedure Laterality Date    ANGIOGRAM,  CORONARY, WITH LEFT HEART CATHETERIZATION N/A 2022    Procedure: Angiogram, Coronary, with Left Heart Cath;  Surgeon: Jorge Tran MD;  Location: [facility] CATH/EP LAB;  Service: Cardiology;  Laterality: N/A;     SECTION      ECHOCARDIOGRAM,TRANSESOPHAGEAL N/A 2023    Procedure: Transesophageal echo (MARIANO) intra-procedure log documentation;  Surgeon: Provider, Dosc Diagnostic;  Location: [facility] EP LAB;  Service: Cardiology;  Laterality: N/A;    INCISION AND DRAINAGE OF HEMATOMA Left 2024    Procedure: INCISION AND DRAINAGE, HEMATOMA;  Surgeon: Bryson Huerta MD;  Location: [facility] OR;  Service: Orthopedics;  Laterality: Left;    JOINT REPLACEMENT      Knee    KNEE ARTHROPLASTY Right 2019    Procedure: ARTHROPLASTY, KNEE;  Surgeon: Delio Chung MD;  Location: [facility] OR;  Service: Orthopedics;  Laterality: Right;  anesthesia:  general and block    REPLACEMENT OF WOUND VACUUM-ASSISTED CLOSURE DEVICE Right 2019    Procedure: REPLACEMENT, WOUND VAC;  Surgeon: Delio Chung MD;  Location: [facility] OR;  Service: Orthopedics;  Laterality: Right;    TONSILLECTOMY      TREATMENT OF CARDIAC ARRHYTHMIA N/A 2023    Procedure: Cardioversion or Defibrillation;  Surgeon: PJ Betancourt MD;  Location: [facility] EP LAB;  Service: Cardiology;  Laterality: N/A;  AF, MARIANO, DCCV, MAC, EH, 3 Prep    VENTRICULOGRAM, LEFT  2022    Procedure: Ventriculogram, Left;  Surgeon: Jorge Tran MD;  Location: [facility] CATH/EP LAB;  Service: Cardiology;;     Social History[1]  Family History   Problem Relation Name Age of Onset    Alzheimer's disease Mother       Review of patient's allergies indicates:  No Known Allergies       Review of Systems:  a review of eleven systems covering constitutional, Eye, HEENT, Psych, Respiratory, Cardiac, GI, , Musculoskeletal, Endocrine, Dermatologic was negative except for pertinent findings as listed ABOVE and below:  pertinent positives as above, rest good     "    Exam:Comprehensive exam done. /81 (BP Location: Left arm, Patient Position: Sitting)   Pulse 72   Ht 5' 3" (1.6 m)   SpO2 (!) 93% Comment: on room air at rest  BMI 38.01 kg/m²   Exam included Vitals as listed, and patient's appearance and affect and alertness and mood, oral exam for yeast and hygiene and pharynx lesions and Mallapatti (M) score, neck with inspection for jvd and masses and thyroid abnormalities and lymph nodes (supraclavicular and infraclavicular nodes and axillary also examined and noted if abn), chest exam included symmetry and effort and fremitus and percussion and auscultation, cardiac exam included rhythm and gallops and murmur and rubs and jvd and edema, abdominal exam for mass and hepatosplenomegaly and tenderness and hernias and bowel sounds, Musculoskeletal exam with muscle tone and posture and mobility/gait and  strength, and skin for rashes and cyanosis and pallor and turgor, extremity for clubbing.  Findings were normal except for pertinent findings listed below:  M3, morbid, chest is symmetric, no distress, normal percussion, normal fremitus and good normal breath sounds  Wheelchair          Radiographs (ct chest and cxr) reviewed: view by direct vision      Labs reviewed           PFT will be done and results to be reviewed       Plan:  Clinical impression is apparently straight forward and impression with management as below.    Iris was seen today for copd.    Diagnoses and all orders for this visit:    Severe persistent asthma without complication  -     Complete PFT with bronchodilator; Future  -     Stress test, pulmonary; Future  -     predniSONE (DELTASONE) 20 MG tablet; Take one daily for 5 days and may repeat for shortness of breath.    Chronic obstructive pulmonary disease with acute exacerbation  -     Ambulatory referral/consult to Pulmonology    Fall  -     Ambulatory referral/consult to Pulmonology    JEFF (acute kidney injury)  -     Ambulatory " referral/consult to Pulmonology    Debility  -     Ambulatory referral/consult to Pulmonology    Asthma-COPD overlap syndrome    Eosinophilic asthma    Long term current use of amiodarone        Follow up in about 2 months (around 2025).    Discussed with patient above for education the following:      Patient Instructions   History of lung disease as youth suggest asthma.   May have copd component as chronic and smoked..   eosinophils have been elevated consistent with eosinophilic asthma..    Continue breztri     Prednisone 20 mg daily daily  for 3- 5 days may improve breathing but better breathing may not translate into much improvement in function?    May consider asthma/copd shots if prednisone very effective..    Do calcium score, and prednisone trial, and sleep study at home     (I may not get report for sleep study)-- less than 5 episodes hourly is normal, 5-15 is mild, over 15 is moderate, over 30 severe-- need over 15 to order zepbound for weight loss and sleep apnea (may not be covered)      Walk test and breathing test recommended     Eval took 40 min       [1]   Social History  Tobacco Use    Smoking status: Some Days     Current packs/day: 0.00     Average packs/day: 0.5 packs/day for 50.0 years (23.7 ttl pk-yrs)     Types: Cigarettes     Start date:      Last attempt to quit: 2024     Years since quittin.5     Passive exposure: Current    Smokeless tobacco: Never    Tobacco comments:     .   Substance Use Topics    Alcohol use: Yes     Comment: RARELY    Drug use: Never

## 2025-07-14 NOTE — PATIENT INSTRUCTIONS
History of lung disease as youth suggest asthma.   May have copd component as chronic and smoked..   eosinophils have been elevated consistent with eosinophilic asthma..    Continue breztri     Prednisone 20 mg daily daily  for 3- 5 days may improve breathing but better breathing may not translate into much improvement in function?    May consider asthma/copd shots if prednisone very effective..    Do calcium score, and prednisone trial, and sleep study at home     (I may not get report for sleep study)-- less than 5 episodes hourly is normal, 5-15 is mild, over 15 is moderate, over 30 severe-- need over 15 to order zepbound for weight loss and sleep apnea (may not be covered)      Walk test and breathing test recommended

## 2025-07-20 ENCOUNTER — DOCUMENT SCAN (OUTPATIENT)
Dept: HOME HEALTH SERVICES | Facility: HOSPITAL | Age: 70
End: 2025-07-20
Payer: MEDICARE

## 2025-07-23 ENCOUNTER — DOCUMENT SCAN (OUTPATIENT)
Dept: HOME HEALTH SERVICES | Facility: HOSPITAL | Age: 70
End: 2025-07-23
Payer: MEDICARE

## 2025-07-28 ENCOUNTER — HOSPITAL ENCOUNTER (OUTPATIENT)
Dept: PULMONOLOGY | Facility: HOSPITAL | Age: 70
Discharge: HOME | End: 2025-07-28
Attending: INTERNAL MEDICINE
Payer: MEDICARE

## 2025-07-28 ENCOUNTER — DOCUMENT SCAN (OUTPATIENT)
Dept: HOME HEALTH SERVICES | Facility: HOSPITAL | Age: 70
End: 2025-07-28
Payer: MEDICARE

## 2025-07-28 ENCOUNTER — TELEPHONE (OUTPATIENT)
Dept: FAMILY MEDICINE | Facility: CLINIC | Age: 70
End: 2025-07-28
Payer: MEDICARE

## 2025-07-28 DIAGNOSIS — J45.50 SEVERE PERSISTENT ASTHMA WITHOUT COMPLICATION: ICD-10-CM

## 2025-07-28 DIAGNOSIS — J45.909 ASTHMA, UNSPECIFIED ASTHMA SEVERITY, UNSPECIFIED WHETHER COMPLICATED, UNSPECIFIED WHETHER PERSISTENT: Primary | ICD-10-CM

## 2025-07-28 PROCEDURE — 94726 PLETHYSMOGRAPHY LUNG VOLUMES: CPT | Mod: 26,,, | Performed by: INTERNAL MEDICINE

## 2025-07-28 PROCEDURE — 94729 DIFFUSING CAPACITY: CPT

## 2025-07-28 PROCEDURE — 94618 PULMONARY STRESS TESTING: CPT

## 2025-07-28 PROCEDURE — 94060 EVALUATION OF WHEEZING: CPT | Mod: 26,59,, | Performed by: INTERNAL MEDICINE

## 2025-07-28 PROCEDURE — 94618 PULMONARY STRESS TESTING: CPT | Mod: 26,,, | Performed by: INTERNAL MEDICINE

## 2025-07-28 PROCEDURE — 94060 EVALUATION OF WHEEZING: CPT

## 2025-07-28 PROCEDURE — 94726 PLETHYSMOGRAPHY LUNG VOLUMES: CPT

## 2025-07-28 PROCEDURE — 94729 DIFFUSING CAPACITY: CPT | Mod: 26,,, | Performed by: INTERNAL MEDICINE

## 2025-07-28 RX ORDER — ALBUTEROL SULFATE 2.5 MG/.5ML
SOLUTION RESPIRATORY (INHALATION)
Status: DISPENSED
Start: 2025-07-28 | End: 2025-07-28

## 2025-08-14 ENCOUNTER — DOCUMENT SCAN (OUTPATIENT)
Dept: HOME HEALTH SERVICES | Facility: HOSPITAL | Age: 70
End: 2025-08-14
Payer: MEDICARE

## 2025-08-19 DIAGNOSIS — E55.9 VITAMIN D DEFICIENCY: Primary | ICD-10-CM

## 2025-08-19 RX ORDER — ASPIRIN 325 MG
50000 TABLET, DELAYED RELEASE (ENTERIC COATED) ORAL
Qty: 12 CAPSULE | Refills: 1 | Status: SHIPPED | OUTPATIENT
Start: 2025-08-19

## 2025-08-21 ENCOUNTER — OFFICE VISIT (OUTPATIENT)
Dept: FAMILY MEDICINE | Facility: CLINIC | Age: 70
End: 2025-08-21
Payer: MEDICARE

## 2025-08-21 ENCOUNTER — TELEPHONE (OUTPATIENT)
Dept: FAMILY MEDICINE | Facility: CLINIC | Age: 70
End: 2025-08-21

## 2025-08-21 VITALS
HEIGHT: 63 IN | SYSTOLIC BLOOD PRESSURE: 144 MMHG | WEIGHT: 209.19 LBS | BODY MASS INDEX: 37.07 KG/M2 | OXYGEN SATURATION: 96 % | HEART RATE: 67 BPM | DIASTOLIC BLOOD PRESSURE: 74 MMHG

## 2025-08-21 DIAGNOSIS — J44.9 CHRONIC OBSTRUCTIVE PULMONARY DISEASE, UNSPECIFIED COPD TYPE: ICD-10-CM

## 2025-08-21 DIAGNOSIS — N18.4 STAGE 4 CHRONIC KIDNEY DISEASE: ICD-10-CM

## 2025-08-21 DIAGNOSIS — N18.4 HYPERTENSIVE KIDNEY DISEASE WITH STAGE 4 CHRONIC KIDNEY DISEASE: ICD-10-CM

## 2025-08-21 DIAGNOSIS — I12.9 HYPERTENSIVE KIDNEY DISEASE WITH STAGE 4 CHRONIC KIDNEY DISEASE: ICD-10-CM

## 2025-08-21 DIAGNOSIS — I35.0 MODERATE AORTIC STENOSIS: ICD-10-CM

## 2025-08-21 DIAGNOSIS — R09.02 HYPOXIA: ICD-10-CM

## 2025-08-21 DIAGNOSIS — I50.32 CHRONIC HEART FAILURE WITH PRESERVED EJECTION FRACTION: ICD-10-CM

## 2025-08-21 DIAGNOSIS — M62.838 CERVICAL PARASPINOUS MUSCLE SPASM: ICD-10-CM

## 2025-08-21 DIAGNOSIS — I48.19 PERSISTENT ATRIAL FIBRILLATION: ICD-10-CM

## 2025-08-21 DIAGNOSIS — M54.16 LUMBAR RADICULOPATHY: ICD-10-CM

## 2025-08-21 DIAGNOSIS — F17.210 CIGARETTE SMOKER: ICD-10-CM

## 2025-08-21 DIAGNOSIS — E66.01 SEVERE OBESITY (BMI 35.0-35.9 WITH COMORBIDITY): ICD-10-CM

## 2025-08-21 DIAGNOSIS — R29.6 RECURRENT FALLS: Primary | ICD-10-CM

## 2025-08-21 RX ORDER — TIZANIDINE 2 MG/1
2 TABLET ORAL EVERY 12 HOURS PRN
Qty: 20 TABLET | Refills: 1 | Status: SHIPPED | OUTPATIENT
Start: 2025-08-21 | End: 2025-08-31

## (undated) DEVICE — GLOVE SURG ULTRA TOUCH 8.5

## (undated) DEVICE — UNDERGLOVES BIOGEL PI SIZE 8.5

## (undated) DEVICE — TR BAND REGULAR

## (undated) DEVICE — DRAPE STERI U-SHAPED 47X51IN

## (undated) DEVICE — GLOVE BIOGEL PIMICRO INDIC 7.5

## (undated) DEVICE — SEE MEDLINE ITEM 146271

## (undated) DEVICE — SOL NACL IRR 1000ML BTL

## (undated) DEVICE — BANDAGE ESMARK 6X12

## (undated) DEVICE — SOL IRR NACL .9% 3000ML

## (undated) DEVICE — SUT ETHILON 4-0 PS2 18 BLK

## (undated) DEVICE — SYS CLSR DERMABOND PRINEO 22CM

## (undated) DEVICE — ELECTRODE REM PLYHSV RETURN 9

## (undated) DEVICE — SEE MEDLINE ITEM 157131

## (undated) DEVICE — TUBE SUCTION YANKAUER HI CAP

## (undated) DEVICE — TOURNIQUET SB QC DP 34X4IN

## (undated) DEVICE — LINER SUCTION 3000CC

## (undated) DEVICE — SOL NACL IRR 3000ML

## (undated) DEVICE — SPONGE SUPER KERLIX 6X6.75IN

## (undated) DEVICE — SEE MEDLINE ITEM 152622

## (undated) DEVICE — GLOVE BIOGEL SKINSENSE PI 7.5

## (undated) DEVICE — SEE MEDLINE ITEM 107746

## (undated) DEVICE — SEE MEDLINE ITEM 157117

## (undated) DEVICE — SLEEVE SCD EXPRESS CALF MEDIUM

## (undated) DEVICE — SOL 9P NACL IRR PIC IL

## (undated) DEVICE — SEE MEDLINE ITEM 146292

## (undated) DEVICE — SUT ETHILON 2-0 PSLX 30IN

## (undated) DEVICE — GLOVE BIOGEL PI MICRO INDIC 8

## (undated) DEVICE — Device

## (undated) DEVICE — SEE MEDLINE ITEM 157166

## (undated) DEVICE — BLADE SAG DUAL 18MMX1.27MMX90M

## (undated) DEVICE — CATHETER DIAGNOSTIC DXTERITY 5FR JL3.5

## (undated) DEVICE — STRAP OR TABLE 5IN X 72IN

## (undated) DEVICE — ADHESIVE DERMABOND ADVANCED

## (undated) DEVICE — SEE MEDLINE ITEM 146231

## (undated) DEVICE — GLOVE BIOGEL SKINSENSE PI 8.0

## (undated) DEVICE — ADHESIVE MASTISOL VIAL 48/BX

## (undated) DEVICE — TOGA FLYTE PEEL AWAY XLARGE

## (undated) DEVICE — PACK ARTHROSCOPY W/ISO BAC

## (undated) DEVICE — INTERPULSE SET

## (undated) DEVICE — SUT 0 VICRYL / CT-1

## (undated) DEVICE — SEE MEDLINE ITEM 157118

## (undated) DEVICE — CATHETER DIAGNOSTIC DXTERITY 6F PIGST

## (undated) DEVICE — DRAPE STERI INSTRUMENT 1018

## (undated) DEVICE — COLLECTOR SPECIMEN ANAEROBIC

## (undated) DEVICE — SUT 2-0 VICRYL / CT-1

## (undated) DEVICE — MANIFOLD 4 PORT

## (undated) DEVICE — PAD CAST SPECIALIST STRL 6

## (undated) DEVICE — CUBE COLD THERAPY POLAR CARE

## (undated) DEVICE — SEE MEDLINE ITEM 152530

## (undated) DEVICE — SPONGE LAP 18X18 PREWASHED

## (undated) DEVICE — DRAPE INCISE IOBAN 2 23X17IN

## (undated) DEVICE — BANDAGE ELASTIC 3X5 VELCRO ST

## (undated) DEVICE — PACK LOWER EXTREMITY

## (undated) DEVICE — GOWN TOGA SYS PEELWY ZIP 2 XL

## (undated) DEVICE — SCRUB 10% POVIDONE IODINE 4OZ

## (undated) DEVICE — KIT DRSNG GRNUFM MED 18X12X5CM

## (undated) DEVICE — PACK BASIC

## (undated) DEVICE — DRAIN SINGLE ROUND 3/16 15F

## (undated) DEVICE — EVACUATOR WOUND BULB 100CC

## (undated) DEVICE — SWAB CULTURETTE SINGLE

## (undated) DEVICE — SYR 50CC LL

## (undated) DEVICE — GLOVE BIOGEL PIMICRO INDIC 8.5

## (undated) DEVICE — PACK CUSTOM UNIV BASIN SLI

## (undated) DEVICE — DRESSING XEROFORM FOIL PK 1X8

## (undated) DEVICE — BOWL CEMENT MIXING HIVAC 7

## (undated) DEVICE — SUT STRATAFIX SPRL PS-2 3-0

## (undated) DEVICE — VAC WOUND DISPOSABLE PREVENA

## (undated) DEVICE — WRAP PROTECTIVE LEG POS STRL

## (undated) DEVICE — SOL PVP-I SCRUB 7.5% 4OZ

## (undated) DEVICE — SET DECANTER MEDICHOICE

## (undated) DEVICE — PAD ABD 8X10 STERILE

## (undated) DEVICE — BLADE SURG CARBON STEEL #10

## (undated) DEVICE — ALCOHOL 70% ISOP RUBBING 4OZ

## (undated) DEVICE — TOURNIQUET SB QC DP 18X4IN

## (undated) DEVICE — PADDING CAST SPECIALIST 6X4YD

## (undated) DEVICE — SUT PRLENE 1CT 1 BL MONO 30

## (undated) DEVICE — DRAIN SINGLE ROUND 1/4 19FR

## (undated) DEVICE — APPLICATOR CHLORAPREP ORN 26ML

## (undated) DEVICE — PAD DEFIB CADENCE ADULT R2

## (undated) DEVICE — NDL SPINAL 18GX3.5 SPINOCAN

## (undated) DEVICE — SUT STRATAFIX PDS 1 CTX 18IN

## (undated) DEVICE — SHEATH INTRODUCER SLENDER 6FX10CM

## (undated) DEVICE — BANDAGE CONFORM 3IN STRL

## (undated) DEVICE — KNEE IMMB UNV FOAM/MESH 20IN

## (undated) DEVICE — TRAY DRY SPONGE SCRUB W FOAM